# Patient Record
Sex: FEMALE | Race: WHITE | NOT HISPANIC OR LATINO | Employment: OTHER | URBAN - METROPOLITAN AREA
[De-identification: names, ages, dates, MRNs, and addresses within clinical notes are randomized per-mention and may not be internally consistent; named-entity substitution may affect disease eponyms.]

---

## 2017-01-06 DIAGNOSIS — R60.9 EDEMA, UNSPECIFIED TYPE: ICD-10-CM

## 2017-01-07 RX ORDER — NITROGLYCERIN 400 UG/1
SPRAY ORAL
Qty: 4.8 G | Refills: 0 | Status: SHIPPED | OUTPATIENT
Start: 2017-01-07 | End: 2017-02-09 | Stop reason: SDUPTHER

## 2017-01-07 RX ORDER — ALBUTEROL SULFATE 90 UG/1
AEROSOL, METERED RESPIRATORY (INHALATION)
Qty: 18 EACH | Refills: 0 | Status: SHIPPED | OUTPATIENT
Start: 2017-01-07 | End: 2017-09-26 | Stop reason: SDUPTHER

## 2017-01-07 RX ORDER — SPIRONOLACTONE 25 MG/1
TABLET ORAL
Qty: 60 TABLET | Refills: 0 | Status: SHIPPED | OUTPATIENT
Start: 2017-01-07 | End: 2017-02-09 | Stop reason: SDUPTHER

## 2017-01-26 ENCOUNTER — DOCUMENTATION ONLY (OUTPATIENT)
Dept: FAMILY MEDICINE | Facility: CLINIC | Age: 72
End: 2017-01-26

## 2017-01-26 NOTE — PROGRESS NOTES
Pre-Visit Chart Review  For Appointment Scheduled on 1/27/2017.    Health Maintenance Due   Topic Date Due    TETANUS VACCINE  06/17/1965    Pneumococcal (65+) (2 of 2 - PPSV23) 11/11/2016    Colonoscopy  11/27/2016

## 2017-01-27 ENCOUNTER — LAB VISIT (OUTPATIENT)
Dept: LAB | Facility: HOSPITAL | Age: 72
End: 2017-01-27
Attending: FAMILY MEDICINE
Payer: MEDICARE

## 2017-01-27 ENCOUNTER — OFFICE VISIT (OUTPATIENT)
Dept: FAMILY MEDICINE | Facility: CLINIC | Age: 72
End: 2017-01-27
Payer: MEDICARE

## 2017-01-27 ENCOUNTER — TELEPHONE (OUTPATIENT)
Dept: ENDOCRINOLOGY | Facility: CLINIC | Age: 72
End: 2017-01-27

## 2017-01-27 VITALS
RESPIRATION RATE: 16 BRPM | WEIGHT: 218.69 LBS | DIASTOLIC BLOOD PRESSURE: 65 MMHG | HEART RATE: 65 BPM | SYSTOLIC BLOOD PRESSURE: 140 MMHG | BODY MASS INDEX: 38.75 KG/M2 | TEMPERATURE: 99 F | HEIGHT: 63 IN

## 2017-01-27 DIAGNOSIS — Z23 IMMUNIZATION DUE: ICD-10-CM

## 2017-01-27 DIAGNOSIS — E11.8 CONTROLLED TYPE 2 DIABETES MELLITUS WITH COMPLICATION, WITHOUT LONG-TERM CURRENT USE OF INSULIN: ICD-10-CM

## 2017-01-27 DIAGNOSIS — I10 ESSENTIAL HYPERTENSION, BENIGN: ICD-10-CM

## 2017-01-27 DIAGNOSIS — K21.9 GASTROESOPHAGEAL REFLUX DISEASE WITHOUT ESOPHAGITIS: ICD-10-CM

## 2017-01-27 DIAGNOSIS — I25.10 CORONARY ATHEROSCLEROSIS OF UNSPECIFIED TYPE OF VESSEL, NATIVE OR GRAFT: ICD-10-CM

## 2017-01-27 DIAGNOSIS — D35.00 ADRENAL ADENOMA, UNSPECIFIED LATERALITY: ICD-10-CM

## 2017-01-27 DIAGNOSIS — E66.01 SEVERE OBESITY (BMI 35.0-35.9 WITH COMORBIDITY): ICD-10-CM

## 2017-01-27 DIAGNOSIS — M25.519 SHOULDER PAIN, UNSPECIFIED CHRONICITY, UNSPECIFIED LATERALITY: ICD-10-CM

## 2017-01-27 DIAGNOSIS — R91.1 PULMONARY NODULE: Primary | ICD-10-CM

## 2017-01-27 DIAGNOSIS — E55.9 VITAMIN D DEFICIENCY: ICD-10-CM

## 2017-01-27 PROCEDURE — 99214 OFFICE O/P EST MOD 30 MIN: CPT | Mod: S$PBB,,, | Performed by: FAMILY MEDICINE

## 2017-01-27 PROCEDURE — 83036 HEMOGLOBIN GLYCOSYLATED A1C: CPT

## 2017-01-27 PROCEDURE — 36415 COLL VENOUS BLD VENIPUNCTURE: CPT | Mod: PO

## 2017-01-27 PROCEDURE — 99999 PR PBB SHADOW E&M-EST. PATIENT-LVL III: CPT | Mod: PBBFAC,,, | Performed by: FAMILY MEDICINE

## 2017-01-27 RX ORDER — LOPERAMIDE HYDROCHLORIDE 2 MG/1
1 CAPSULE ORAL 4 TIMES DAILY PRN
COMMUNITY
Start: 2017-01-06 | End: 2018-09-10 | Stop reason: SDUPTHER

## 2017-01-27 RX ORDER — ACETAMINOPHEN 500 MG
500 TABLET ORAL EVERY 6 HOURS PRN
Status: ON HOLD | COMMUNITY
End: 2022-02-21 | Stop reason: HOSPADM

## 2017-01-27 RX ORDER — ERGOCALCIFEROL 1.25 MG/1
50000 CAPSULE ORAL
Qty: 12 CAPSULE | Refills: 0 | Status: SHIPPED | OUTPATIENT
Start: 2017-01-27 | End: 2017-03-17 | Stop reason: SDUPTHER

## 2017-01-27 NOTE — TELEPHONE ENCOUNTER
----- Message from Petra Sims sent at 1/27/2017  2:04 PM CST -----  Pt was seen today by Dr Sierra and needs an appt for Adrenal adenoma, unspecified laterality  She is willing to see anybody.  Please call her @ [phone].  Thanks !

## 2017-01-27 NOTE — PROGRESS NOTES
"[facility]  Progress Note    Subjective:       Patient ID: Marisol Kerr is a 71 y.o. female.    Chief Complaint: shoulder pain    HPI71 y.o.female is here today for follow-up visit.  Patient is coming early complaining of bilateral shoulder pain.  Patient is having pain has been increasing in severity.  Patient has been taking Tylenol for pain.  Tylenol has not been adequately controlling her symptoms of pain.  Patient has had recent colonoscopy performed.  Patient is also to follow with cardiology and pulmonary.  No further complaints at today's visit.  Review of Systems   Constitutional: Negative for chills, fatigue and fever.   HENT: Negative for congestion, ear pain, postnasal drip, sinus pressure, sneezing and sore throat.    Eyes: Negative for pain.   Respiratory: Negative for cough, shortness of breath and wheezing.    Cardiovascular: Negative for chest pain, palpitations and leg swelling.   Gastrointestinal: Negative for abdominal distention, abdominal pain, constipation, diarrhea, nausea and vomiting.   Genitourinary: Negative for difficulty urinating.   Musculoskeletal: Positive for arthralgias and myalgias. Negative for back pain.   Skin: Negative for rash.   Neurological: Negative for dizziness, seizures, syncope, weakness and numbness.   Psychiatric/Behavioral: Negative for sleep disturbance. The patient is not nervous/anxious.         Objective:      Vitals:    01/27/17 1310   BP: (!) 140/65   BP Location: Right arm   Patient Position: Sitting   BP Method: Automatic   Pulse: 65   Resp: 16   Temp: 98.5 °F (36.9 °C)   TempSrc: Oral   Weight: 99.2 kg (218 lb 11.1 oz)   Height: 5' 3" (1.6 m)     Body mass index is 38.74 kg/(m^2).  Physical Exam   Constitutional: She is oriented to person, place, and time. She appears well-developed and well-nourished.   Eyes: Conjunctivae are normal.   Cardiovascular: Normal rate, regular rhythm, normal heart sounds and intact distal pulses.  Exam reveals no " gallop and no friction rub.    No murmur heard.  Pulmonary/Chest: Effort normal and breath sounds normal. No respiratory distress. She has no wheezes. She has no rales. She exhibits no tenderness.   Abdominal: Soft.   Musculoskeletal: Normal range of motion.   Neurological: She is alert and oriented to person, place, and time.   Skin: She is not diaphoretic.   Psychiatric: She has a normal mood and affect. Her behavior is normal. Judgment and thought content normal.   Vitals reviewed.      Assessment:       1. Pulmonary nodule    2. Vitamin D deficiency    3. Essential hypertension, benign    4. Controlled type 2 diabetes mellitus with complication, without long-term current use of insulin    5. Severe obesity (BMI 35.0-35.9 with comorbidity)    6. Gastroesophageal reflux disease without esophagitis    7. Coronary atherosclerosis of unspecified type of vessel, native or graft    8. Shoulder pain, unspecified chronicity, unspecified laterality    9. Immunization due    10. Adrenal adenoma, unspecified laterality        Plan:       Pulmonary nodule  -     Follow up with pulmonary for further recommendations   Vitamin D deficiency  -     ergocalciferol (ERGOCALCIFEROL) 50,000 unit Cap; Take 1 capsule (50,000 Units total) by mouth every 7 days.  Dispense: 12 capsule; Refill: 0  Essential hypertension, benign        - Well controlled continue current medication  Controlled type 2 diabetes mellitus with complication, without long-term current use of insulin        - Well controlled continue current medications  Severe obesity (BMI 35.0-35.9 with comorbidity)        - Patient educated on diet and exercise   Gastroesophageal reflux disease without esophagitis        - Well controlled continue current medications  Coronary atherosclerosis of unspecified type of vessel, native or graft        - Follow up with cardiology   Shoulder pain, unspecified chronicity, unspecified laterality  -     Ambulatory referral to Physical  Medicine Rehab  Immunization due  -     Pneumococcal Polysaccharide Vaccine (23 Valent) (SQ/IM)  Adrenal adenoma        - Follow up with endocrinology     Patient readiness: acceptance and barriers:none    During the course of the visit the patient was educated and counseled about the following:     Diabetes:  Discussed general issues about diabetes pathophysiology and management.  Educational material distributed.  Addressed ADA diet.  Suggested low cholesterol diet.  Encouraged aerobic exercise.  Discussed foot care.  Reminded to get yearly retinal exam.  Discussed ways to avoid symptomatic hypoglycemia.  Hypertension:   Dietary sodium restriction.  Regular aerobic exercise.  Check blood pressures weekly and record.  Obesity:   General weight loss/lifestyle modification strategies discussed (elicit support from others; identify saboteurs; non-food rewards, etc).  Informal exercise measures discussed, e.g. taking stairs instead of elevator.  Regular aerobic exercise program discussed.    Goals: Diabetes: Maintain Hemoglobin A1C below 7, Hypertension: Reduce Blood Pressure and Obesity: Reduce calorie intake and BMI    Did patient meet goals/outcomes: Yes    The following self management tools provided: blood pressure log  blood glucose log  excercise log    Patient Instructions (the written plan) was given to the patient/family.     Time spent with patient: 30 minutes      Return in about 6 months (around 7/27/2017).  Krishan Sierra MD  [facility]  1/27/2017 1:29 PM

## 2017-01-27 NOTE — MR AVS SNAPSHOT
[facility]  [address]  [place] [address]  Phone: [phone]  Fax: [phone]                  Marisol Kerr   2017 1:00 PM   Office Visit    Description:  Female : 1945   Provider:  Krishan Sierra MD   Department:  [facility]           Reason for Visit     Follow-up           Diagnoses this Visit        Comments    Pulmonary nodule    -  Primary     Vitamin D deficiency         Essential hypertension, benign         Controlled type 2 diabetes mellitus with complication, without long-term current use of insulin         Severe obesity (BMI 35.0-35.9 with comorbidity)         Gastroesophageal reflux disease without esophagitis         Coronary atherosclerosis of unspecified type of vessel, native or graft         Shoulder pain, unspecified chronicity, unspecified laterality         Immunization due         Adrenal adenoma, unspecified laterality                To Do List           Future Appointments        Provider Department Dept Phone    2017 2:15 PM [facility] [phone]    3/2/2017 1:20 PM Heather Henderson DO [facility] [phone]    2017 1:00 PM Krishan Sierra MD [facility] [phone]      Goals (5 Years of Data)     None      Follow-Up and Disposition     Return in about 6 months (around 2017).    Follow-up and Disposition History       These Medications        Disp Refills Start End    ergocalciferol (ERGOCALCIFEROL) 50,000 unit Cap 12 capsule 0 2017     Take 1 capsule (50,000 Units total) by mouth every 7 days. - Oral    Pharmacy: [facility] ROHINI MARTINEZ [facility] Ph #: [phone]         [facility]     [facility] Nurse Care Line -  Assistance  Registered nurses in the [facility] provide clinical advisement, health education, appointment booking, and other advisory services.  Call for this free  service at [phone].             Medications           START taking these NEW medications        Refills    ergocalciferol (ERGOCALCIFEROL) 50,000 unit Cap 0    Sig: Take 1 capsule (50,000 Units total) by mouth every 7 days.    Class: Normal    Route: Oral           Verify that the below list of medications is an accurate representation of the medications you are currently taking.  If none reported, the list may be blank. If incorrect, please contact your healthcare provider. Carry this list with you in case of emergency.           Current Medications     acetaminophen (TYLENOL) 500 MG tablet Take 500 mg by mouth every 6 (six) hours as needed for Pain.    ADVAIR DISKUS 250-50 mcg/dose diskus inhaler INHALE ONE DOSE BY MOUTH TWICE DAILY    albuterol (PROVENTIL) 2.5 mg /3 mL (0.083 %) nebulizer solution USE ONE VIAL IN NEBULIZER EVERY 6 HOURS AS NEEDED FOR WHEEZING    ALBUTEROL SULFATE (VENTOLIN HFA INHL) Inhale 2 puffs into the lungs every 6 (six) hours as needed.    aspirin (ECOTRIN) 81 MG EC tablet Take 81 mg by mouth once daily.    benazepril (LOTENSIN) 40 MG tablet Take 1 tablet (40 mg total) by mouth once daily.    CALCIUM CARBONATE/VITAMIN D3 (VITAMIN D-3 ORAL) Take by mouth once daily. 1000 IU daily    ciclopirox (LOPROX) 0.77 % Crea Apply topically 2 (two) times daily.    COMBIVENT RESPIMAT  mcg/actuation inhaler INHALE TWO PUFFS BY MOUTH EVERY 4 HOURS AS NEEDED    fluticasone (FLONASE) 50 mcg/actuation nasal spray 2 sprays by Each Nare route once daily.    INCRUSE ELLIPTA 62.5 mcg/actuation DsDv     lipase-protease-amylase 12,000-38,000-60,000 units (CREON) CpDR Take 4 capsules by mouth 3 (three) times daily with meals.    loperamide (IMODIUM) 2 mg capsule Take 1 capsule by mouth 4 (four) times daily as needed for Diarrhea.    metformin (GLUCOPHAGE) 500 MG tablet TAKE ONE TABLET BY MOUTH ONCE DAILY WITH BREAKFAST    metoprolol succinate (TOPROL-XL) 50 MG 24 hr tablet Take 1 tablet (50 mg total)  "by mouth once daily.    mupirocin (BACTROBAN) 2 % ointment Apply topically once daily.    nitroGLYCERIN 0.2 mg/hr TD PT24 (NITRODUR) 0.2 mg/hr Place 1 patch onto the skin once daily.    nitroGLYCERIN 0.4 MG/DOSE TL SPRY (NITROLINGUAL) 400 mcg/spray spray PLACE 1 SPRAY UNDER THEN TONGUE EVERY 5 MINUTES AS NEEDED FOR CHEST PAIN (DO NOT EXCEED 3 SPRAYS WITHIN 15 MIN)    ondansetron (ZOFRAN-ODT) 4 MG TbDL     pantoprazole (PROTONIX) 20 MG tablet TAKE ONE TABLET BY MOUTH ONCE DAILY    PNV NO.115/IRON FUMARATE/FA (PRENATAL #115-IRON-FOLIC ACID ORAL) Take by mouth.    pravastatin (PRAVACHOL) 40 MG tablet Take 40 mg by mouth once daily.     spironolactone (ALDACTONE) 25 MG tablet TAKE ONE TABLET BY MOUTH TWICE DAILY    VENTOLIN HFA 90 mcg/actuation inhaler INHALE TWO PUFFS BY MOUTH EVERY 6 HOURS AS NEEDED    VIT A/VIT C/VIT E/ZINC/COPPER (ICAPS AREDS ORAL) Take 1 capsule by mouth 2 (two) times daily.    ergocalciferol (ERGOCALCIFEROL) 50,000 unit Cap Take 1 capsule (50,000 Units total) by mouth every 7 days.           Clinical Reference Information           Vital Signs - Last Recorded  Most recent update: 1/27/2017  1:24 PM by Lisa Irby MA    BP Pulse Temp Resp Ht Wt    (!) 140/65 (BP Location: Right arm, Patient Position: Sitting, BP Method: Automatic) 65 98.5 °F (36.9 °C) (Oral) 16 5' 3" (1.6 m) 99.2 kg (218 lb 11.1 oz)    BMI                38.74 kg/m2          Blood Pressure          Most Recent Value    BP  (!)  140/65      Allergies as of 1/27/2017     Contrast Media    Strawberries [Strawberry]      Immunizations Administered on Date of Encounter - 1/27/2017     Name Date Dose VIS Date Route    Pneumococcal Polysaccharide - 23 Valent 1/27/2017 0.5 mL 4/24/2015 Intramuscular      Orders Placed During Today's Visit      Normal Orders This Visit    Ambulatory referral to Endocrinology     Ambulatory referral to Physical Medicine Rehab     Pneumococcal Polysaccharide Vaccine (23 Valent) (SQ/IM)     Future " Labs/Procedures Expected by Expires    Hemoglobin A1c  1/27/2017 3/28/2018

## 2017-01-27 NOTE — TELEPHONE ENCOUNTER
Advised she did not want to go to [place] for sooner appt would like to keep appointment with Dr Spencer

## 2017-01-27 NOTE — PROGRESS NOTES
Two Patient identifiers used, Name and . VIS information given to patient and procedure was explained. Patient verbalized understanding. Pneumovax 23 administered IM in the left deltoid using sterile technique.  No residual bleeding noted. Patient tolerated procedure well.

## 2017-01-28 LAB
ESTIMATED AVG GLUCOSE: 120 MG/DL
HBA1C MFR BLD HPLC: 5.8 %

## 2017-02-09 DIAGNOSIS — R60.9 EDEMA, UNSPECIFIED TYPE: ICD-10-CM

## 2017-02-09 RX ORDER — ALBUTEROL SULFATE 90 UG/1
AEROSOL, METERED RESPIRATORY (INHALATION)
Qty: 18 EACH | Refills: 0 | Status: SHIPPED | OUTPATIENT
Start: 2017-02-09 | End: 2017-02-28 | Stop reason: SDUPTHER

## 2017-02-09 RX ORDER — SPIRONOLACTONE 25 MG/1
TABLET ORAL
Qty: 60 TABLET | Refills: 0 | Status: SHIPPED | OUTPATIENT
Start: 2017-02-09 | End: 2017-03-14 | Stop reason: SDUPTHER

## 2017-02-09 RX ORDER — NITROGLYCERIN 400 UG/1
SPRAY ORAL
Qty: 4.8 G | Refills: 0 | Status: SHIPPED | OUTPATIENT
Start: 2017-02-09 | End: 2017-03-14 | Stop reason: SDUPTHER

## 2017-02-11 RX ORDER — BENAZEPRIL HYDROCHLORIDE 40 MG/1
TABLET ORAL
Qty: 90 TABLET | Refills: 0 | OUTPATIENT
Start: 2017-02-11

## 2017-02-27 DIAGNOSIS — R19.7 DIARRHEA, UNSPECIFIED TYPE: ICD-10-CM

## 2017-02-28 RX ORDER — LOPERAMIDE HYDROCHLORIDE 2 MG/1
CAPSULE ORAL
Qty: 30 CAPSULE | Refills: 0 | Status: SHIPPED | OUTPATIENT
Start: 2017-02-28 | End: 2017-09-26 | Stop reason: SDUPTHER

## 2017-02-28 RX ORDER — BENAZEPRIL HYDROCHLORIDE 40 MG/1
TABLET ORAL
Qty: 90 TABLET | Refills: 0 | OUTPATIENT
Start: 2017-02-28

## 2017-02-28 RX ORDER — ALBUTEROL SULFATE 90 UG/1
AEROSOL, METERED RESPIRATORY (INHALATION)
Qty: 18 EACH | Refills: 0 | Status: SHIPPED | OUTPATIENT
Start: 2017-02-28 | End: 2017-04-14 | Stop reason: SDUPTHER

## 2017-03-01 ENCOUNTER — TELEPHONE (OUTPATIENT)
Dept: PHYSICAL MEDICINE AND REHAB | Facility: CLINIC | Age: 72
End: 2017-03-01

## 2017-03-06 ENCOUNTER — TELEPHONE (OUTPATIENT)
Dept: PHYSICAL MEDICINE AND REHAB | Facility: CLINIC | Age: 72
End: 2017-03-06

## 2017-03-14 DIAGNOSIS — R60.9 EDEMA, UNSPECIFIED TYPE: ICD-10-CM

## 2017-03-14 DIAGNOSIS — R19.7 DIARRHEA, UNSPECIFIED TYPE: ICD-10-CM

## 2017-03-14 RX ORDER — BENAZEPRIL HYDROCHLORIDE 40 MG/1
TABLET ORAL
Qty: 90 TABLET | Refills: 0 | OUTPATIENT
Start: 2017-03-14

## 2017-03-15 RX ORDER — NITROGLYCERIN 400 UG/1
SPRAY ORAL
Qty: 4.9 G | Refills: 6 | Status: SHIPPED | OUTPATIENT
Start: 2017-03-15 | End: 2017-09-26 | Stop reason: SDUPTHER

## 2017-03-15 RX ORDER — LOPERAMIDE HYDROCHLORIDE 2 MG/1
CAPSULE ORAL
Qty: 30 CAPSULE | Refills: 6 | Status: SHIPPED | OUTPATIENT
Start: 2017-03-15 | End: 2018-09-10 | Stop reason: SDUPTHER

## 2017-03-15 RX ORDER — SPIRONOLACTONE 25 MG/1
TABLET ORAL
Qty: 60 TABLET | Refills: 6 | Status: SHIPPED | OUTPATIENT
Start: 2017-03-15 | End: 2019-07-30

## 2017-03-15 RX ORDER — NITROGLYCERIN 400 UG/1
SPRAY ORAL
Qty: 4.9 G | Refills: 6 | Status: SHIPPED | OUTPATIENT
Start: 2017-03-15 | End: 2017-12-08 | Stop reason: SDUPTHER

## 2017-03-17 DIAGNOSIS — E55.9 VITAMIN D DEFICIENCY: ICD-10-CM

## 2017-03-21 RX ORDER — ERGOCALCIFEROL 1.25 MG/1
CAPSULE ORAL
Qty: 12 CAPSULE | Refills: 0 | Status: SHIPPED | OUTPATIENT
Start: 2017-03-21 | End: 2017-05-13 | Stop reason: SDUPTHER

## 2017-04-17 RX ORDER — BENAZEPRIL HYDROCHLORIDE 40 MG/1
TABLET ORAL
Qty: 30 TABLET | Refills: 5 | Status: SHIPPED | OUTPATIENT
Start: 2017-04-17 | End: 2017-09-26 | Stop reason: SDUPTHER

## 2017-04-17 RX ORDER — METOPROLOL SUCCINATE 50 MG/1
TABLET, EXTENDED RELEASE ORAL
Qty: 30 TABLET | Refills: 0 | Status: SHIPPED | OUTPATIENT
Start: 2017-04-17 | End: 2017-05-13 | Stop reason: SDUPTHER

## 2017-04-17 RX ORDER — ALBUTEROL SULFATE 90 UG/1
AEROSOL, METERED RESPIRATORY (INHALATION)
Qty: 18 EACH | Refills: 0 | Status: SHIPPED | OUTPATIENT
Start: 2017-04-17 | End: 2017-05-13 | Stop reason: SDUPTHER

## 2017-04-17 RX ORDER — METFORMIN HYDROCHLORIDE 500 MG/1
TABLET ORAL
Qty: 30 TABLET | Refills: 0 | Status: SHIPPED | OUTPATIENT
Start: 2017-04-17 | End: 2017-05-13 | Stop reason: SDUPTHER

## 2017-05-13 DIAGNOSIS — E55.9 VITAMIN D DEFICIENCY: ICD-10-CM

## 2017-05-15 RX ORDER — ALBUTEROL SULFATE 90 UG/1
AEROSOL, METERED RESPIRATORY (INHALATION)
Qty: 18 EACH | Refills: 0 | Status: SHIPPED | OUTPATIENT
Start: 2017-05-15 | End: 2017-06-18 | Stop reason: SDUPTHER

## 2017-05-15 RX ORDER — IPRATROPIUM BROMIDE AND ALBUTEROL 20; 100 UG/1; UG/1
SPRAY, METERED RESPIRATORY (INHALATION)
Qty: 4 G | Refills: 2 | Status: SHIPPED | OUTPATIENT
Start: 2017-05-15 | End: 2017-09-26 | Stop reason: SDUPTHER

## 2017-05-15 RX ORDER — METOPROLOL SUCCINATE 50 MG/1
TABLET, EXTENDED RELEASE ORAL
Qty: 30 TABLET | Refills: 0 | Status: SHIPPED | OUTPATIENT
Start: 2017-05-15 | End: 2017-06-18 | Stop reason: SDUPTHER

## 2017-05-15 RX ORDER — ERGOCALCIFEROL 1.25 MG/1
CAPSULE ORAL
Qty: 12 CAPSULE | Refills: 0 | Status: SHIPPED | OUTPATIENT
Start: 2017-05-15 | End: 2017-06-18 | Stop reason: SDUPTHER

## 2017-05-15 RX ORDER — METFORMIN HYDROCHLORIDE 500 MG/1
TABLET ORAL
Qty: 30 TABLET | Refills: 0 | Status: SHIPPED | OUTPATIENT
Start: 2017-05-15 | End: 2017-06-18 | Stop reason: SDUPTHER

## 2017-05-26 ENCOUNTER — TELEPHONE (OUTPATIENT)
Dept: FAMILY MEDICINE | Facility: CLINIC | Age: 72
End: 2017-05-26

## 2017-06-18 DIAGNOSIS — E55.9 VITAMIN D DEFICIENCY: ICD-10-CM

## 2017-06-19 RX ORDER — ALBUTEROL SULFATE 90 UG/1
AEROSOL, METERED RESPIRATORY (INHALATION)
Qty: 18 EACH | Refills: 0 | Status: SHIPPED | OUTPATIENT
Start: 2017-06-19 | End: 2017-07-18 | Stop reason: SDUPTHER

## 2017-06-19 RX ORDER — METFORMIN HYDROCHLORIDE 500 MG/1
TABLET ORAL
Qty: 30 TABLET | Refills: 0 | Status: SHIPPED | OUTPATIENT
Start: 2017-06-19 | End: 2017-07-18 | Stop reason: SDUPTHER

## 2017-06-19 RX ORDER — ERGOCALCIFEROL 1.25 MG/1
CAPSULE ORAL
Qty: 12 CAPSULE | Refills: 0 | Status: SHIPPED | OUTPATIENT
Start: 2017-06-19 | End: 2017-07-18 | Stop reason: SDUPTHER

## 2017-06-19 RX ORDER — PANTOPRAZOLE SODIUM 20 MG/1
TABLET, DELAYED RELEASE ORAL
Qty: 30 TABLET | Refills: 0 | Status: SHIPPED | OUTPATIENT
Start: 2017-06-19 | End: 2017-07-18 | Stop reason: SDUPTHER

## 2017-06-19 RX ORDER — METOPROLOL SUCCINATE 50 MG/1
TABLET, EXTENDED RELEASE ORAL
Qty: 30 TABLET | Refills: 0 | Status: SHIPPED | OUTPATIENT
Start: 2017-06-19 | End: 2017-07-18 | Stop reason: SDUPTHER

## 2017-06-23 ENCOUNTER — DOCUMENTATION ONLY (OUTPATIENT)
Dept: FAMILY MEDICINE | Facility: CLINIC | Age: 72
End: 2017-06-23

## 2017-06-23 NOTE — PROGRESS NOTES
Pre-Visit Chart Review  For Appointment Scheduled on 6/28/17.    Health Maintenance Due   Topic Date Due    TETANUS VACCINE  06/17/1965    Foot Exam  06/08/2017    Eye Exam  06/22/2017

## 2017-07-18 DIAGNOSIS — E55.9 VITAMIN D DEFICIENCY: ICD-10-CM

## 2017-07-18 RX ORDER — ERGOCALCIFEROL 1.25 MG/1
CAPSULE ORAL
Qty: 12 CAPSULE | Refills: 0 | Status: SHIPPED | OUTPATIENT
Start: 2017-07-18 | End: 2017-09-27 | Stop reason: SDUPTHER

## 2017-07-18 RX ORDER — METOPROLOL SUCCINATE 50 MG/1
TABLET, EXTENDED RELEASE ORAL
Qty: 90 TABLET | Refills: 3 | Status: SHIPPED | OUTPATIENT
Start: 2017-07-18 | End: 2018-05-26 | Stop reason: SDUPTHER

## 2017-07-18 RX ORDER — PANTOPRAZOLE SODIUM 20 MG/1
TABLET, DELAYED RELEASE ORAL
Qty: 90 TABLET | Refills: 3 | Status: SHIPPED | OUTPATIENT
Start: 2017-07-18 | End: 2018-04-20 | Stop reason: SDUPTHER

## 2017-07-18 RX ORDER — METFORMIN HYDROCHLORIDE 500 MG/1
TABLET ORAL
Qty: 90 TABLET | Refills: 3 | Status: SHIPPED | OUTPATIENT
Start: 2017-07-18 | End: 2018-04-20 | Stop reason: SDUPTHER

## 2017-07-18 RX ORDER — ALBUTEROL SULFATE 90 UG/1
AEROSOL, METERED RESPIRATORY (INHALATION)
Qty: 18 EACH | Refills: 3 | Status: SHIPPED | OUTPATIENT
Start: 2017-07-18 | End: 2017-11-13 | Stop reason: SDUPTHER

## 2017-08-08 ENCOUNTER — DOCUMENTATION ONLY (OUTPATIENT)
Dept: FAMILY MEDICINE | Facility: CLINIC | Age: 72
End: 2017-08-08

## 2017-08-08 NOTE — PROGRESS NOTES
Pre-Visit Chart Review  For Appointment Scheduled on 8/10/17.    Health Maintenance Due   Topic Date Due    TETANUS VACCINE  06/17/1965    Foot Exam  06/08/2017    Eye Exam  06/22/2017    Hemoglobin A1c  07/27/2017    Influenza Vaccine  08/01/2017

## 2017-08-17 DIAGNOSIS — E11.9 TYPE 2 DIABETES MELLITUS WITHOUT COMPLICATION: ICD-10-CM

## 2017-09-25 ENCOUNTER — NURSE TRIAGE (OUTPATIENT)
Dept: ADMINISTRATIVE | Facility: CLINIC | Age: 72
End: 2017-09-25

## 2017-09-25 NOTE — TELEPHONE ENCOUNTER
Complains of cough and congestion, 99.0-100.0 temp x 2days. Scheduled with patient 9/26/17 1500 JEFF Ruiz for assessment

## 2017-09-25 NOTE — TELEPHONE ENCOUNTER
Reason for Disposition   Continuous (nonstop) coughing interferes with work or school and no improvement using cough treatment per Care Advice    Protocols used: ST COUGH-A-[facility]    Call transferred from the scheduling desk.  Pt calling with concerns of coughing, runny nose and intermittent chest pain.  Pt states having thick mucous with cough, but mucous will not come up.  Pt not having chest pain now.  Attempt to schedule appointment with PCP, no available appointments noted.  Attempt to schedule with another Provider, declined.  Advised UC/ED.  Will message MD and Staff.

## 2017-09-26 ENCOUNTER — OFFICE VISIT (OUTPATIENT)
Dept: FAMILY MEDICINE | Facility: CLINIC | Age: 72
End: 2017-09-26
Payer: MEDICARE

## 2017-09-26 ENCOUNTER — DOCUMENTATION ONLY (OUTPATIENT)
Dept: FAMILY MEDICINE | Facility: CLINIC | Age: 72
End: 2017-09-26

## 2017-09-26 ENCOUNTER — HOSPITAL ENCOUNTER (OUTPATIENT)
Dept: RADIOLOGY | Facility: CLINIC | Age: 72
Discharge: HOME | End: 2017-09-26
Attending: PHYSICIAN ASSISTANT
Payer: MEDICARE

## 2017-09-26 VITALS
BODY MASS INDEX: 37.65 KG/M2 | WEIGHT: 212.5 LBS | HEART RATE: 69 BPM | TEMPERATURE: 98 F | SYSTOLIC BLOOD PRESSURE: 132 MMHG | HEIGHT: 63 IN | DIASTOLIC BLOOD PRESSURE: 48 MMHG

## 2017-09-26 DIAGNOSIS — R06.02 SOB (SHORTNESS OF BREATH): Primary | ICD-10-CM

## 2017-09-26 DIAGNOSIS — R06.01 ORTHOPNEA: ICD-10-CM

## 2017-09-26 DIAGNOSIS — L03.116 CELLULITIS OF LEFT LOWER EXTREMITY: ICD-10-CM

## 2017-09-26 DIAGNOSIS — E11.8 DM TYPE 2, CONTROLLED, WITH COMPLICATION: ICD-10-CM

## 2017-09-26 DIAGNOSIS — R60.0 LOWER EXTREMITY EDEMA: ICD-10-CM

## 2017-09-26 DIAGNOSIS — R06.02 SOB (SHORTNESS OF BREATH): ICD-10-CM

## 2017-09-26 PROCEDURE — 3078F DIAST BP <80 MM HG: CPT | Mod: ,,, | Performed by: PHYSICIAN ASSISTANT

## 2017-09-26 PROCEDURE — 1159F MED LIST DOCD IN RCRD: CPT | Mod: ,,, | Performed by: PHYSICIAN ASSISTANT

## 2017-09-26 PROCEDURE — 99215 OFFICE O/P EST HI 40 MIN: CPT | Mod: PBBFAC,PO | Performed by: PHYSICIAN ASSISTANT

## 2017-09-26 PROCEDURE — 1126F AMNT PAIN NOTED NONE PRSNT: CPT | Mod: ,,, | Performed by: PHYSICIAN ASSISTANT

## 2017-09-26 PROCEDURE — 71020 XR CHEST PA AND LATERAL: CPT | Mod: 26,,, | Performed by: RADIOLOGY

## 2017-09-26 PROCEDURE — 99214 OFFICE O/P EST MOD 30 MIN: CPT | Mod: S$PBB,,, | Performed by: PHYSICIAN ASSISTANT

## 2017-09-26 PROCEDURE — 71020 XR CHEST PA AND LATERAL: CPT | Mod: TC,PO

## 2017-09-26 PROCEDURE — 99999 PR PBB SHADOW E&M-EST. PATIENT-LVL V: CPT | Mod: PBBFAC,,, | Performed by: PHYSICIAN ASSISTANT

## 2017-09-26 PROCEDURE — 3075F SYST BP GE 130 - 139MM HG: CPT | Mod: ,,, | Performed by: PHYSICIAN ASSISTANT

## 2017-09-26 RX ORDER — FUROSEMIDE 20 MG/1
20 TABLET ORAL DAILY
Qty: 30 TABLET | Refills: 11 | Status: SHIPPED | OUTPATIENT
Start: 2017-09-26 | End: 2018-09-26

## 2017-09-26 RX ORDER — DOXYCYCLINE 100 MG/1
100 CAPSULE ORAL EVERY 12 HOURS
Qty: 20 CAPSULE | Refills: 0 | Status: SHIPPED | OUTPATIENT
Start: 2017-09-26 | End: 2019-01-16

## 2017-09-26 RX ORDER — MUPIROCIN 20 MG/G
OINTMENT TOPICAL DAILY
Qty: 30 G | Refills: 0 | Status: SHIPPED | OUTPATIENT
Start: 2017-09-26 | End: 2019-09-16 | Stop reason: ALTCHOICE

## 2017-09-26 NOTE — PROGRESS NOTES
Subjective:       Patient ID: Marisol Kerr is a 72 y.o. female.    Chief Complaint: Cough and Chest Congestion    Shortness of Breath   This is a recurrent problem. The current episode started 1 to 4 weeks ago. The problem occurs constantly. The problem has been gradually worsening. Associated symptoms include chest pain, leg pain, leg swelling, neck pain, orthopnea and sputum production. Pertinent negatives include no abdominal pain, fever, headaches, PND or rash. Risk factors include prolonged immobilization. She has tried beta agonist inhalers for the symptoms. The treatment provided mild relief.     Review of Systems   Constitutional: Negative for activity change, appetite change, chills, diaphoresis, fatigue, fever and unexpected weight change.   Eyes: Negative for visual disturbance.   Respiratory: Positive for cough, sputum production, chest tightness and shortness of breath.    Cardiovascular: Positive for chest pain, orthopnea and leg swelling. Negative for palpitations and PND.        Positive for orthopnea _sleeping in recliner now X 1 month    Gastrointestinal: Negative for abdominal distention, abdominal pain, constipation, diarrhea and nausea.   Endocrine: Negative for polydipsia and polyuria.   Genitourinary: Negative for difficulty urinating.   Musculoskeletal: Positive for neck pain. Negative for arthralgias.   Skin: Negative for rash.   Neurological: Negative for dizziness, light-headedness and headaches.   Psychiatric/Behavioral: Negative for dysphoric mood and sleep disturbance. The patient is not nervous/anxious.        Objective:      Physical Exam   Constitutional: She is cooperative. She does not have a sickly appearance. She does not appear ill. No distress. Nasal cannula in place.   Obese female in wheelchair    HENT:   Head: Normocephalic and atraumatic.   Eyes: Conjunctivae and EOM are normal. Pupils are equal, round, and reactive to light. No scleral icterus.   Cardiovascular: Normal  rate, regular rhythm and normal heart sounds.    Pulmonary/Chest: Effort normal and breath sounds normal.   Abdominal: Soft. Bowel sounds are normal.   Musculoskeletal:        Right lower leg: She exhibits edema (2 +).        Left lower leg: She exhibits edema (2+).        Legs:  Neurological: She is alert.   Skin: Skin is warm and dry.   Psychiatric: She has a normal mood and affect. Her speech is normal. Judgment normal. Cognition and memory are normal.   Vitals reviewed.      Assessment:       1. SOB (shortness of breath)    2. Orthopnea    3. Lower extremity edema    4. Cellulitis of left lower extremity    5. DM type 2, controlled, with complication        Plan:       Marisol was seen today for cough and chest congestion.    Diagnoses and all orders for this visit:    SOB (shortness of breath)  -     X-Ray Chest PA And Lateral; Future  -     Brain natriuretic peptide; Future  -     Comprehensive metabolic panel; Future    furosemide (LASIX) 20 MG tablet; Take 1 tablet (20 mg total) by mouth once daily.    Increase lasix to bid X 3 day   Orthopnea  -     X-Ray Chest PA And Lateral; Future  -     Brain natriuretic peptide; Future  -     Comprehensive metabolic panel; Future    Lower extremity edema  Increase lasix as above   Cellulitis of left lower extremity  -     doxycycline (VIBRAMYCIN) 100 MG Cap; Take 1 capsule (100 mg total) by mouth every 12 (twelve) hours.       mupirocin (BACTROBAN) 2 % ointment; Apply topically once daily.  -       DM type 2, controlled, with complication  -     Hemoglobin A1c; Future    1 week follow up   Sooner if symptoms worsen   Further recommendations will be made based on results    -   NAD, VSS, Afebrile, No facial or scalp tender or swelling, No spinal tender, No rib or CVA tender, No pelvic or hip tender, + B/L proximal tibia, lateral aspect, skin tears without active bleeding or gael tender. + Distal femur, medial aspect skin tear, no active bleeding or gael tender, N/V- intact.

## 2017-09-26 NOTE — PATIENT INSTRUCTIONS
Increase lasix to 20 mg twice a day X 3 days  Start antibiotic and take for 10 days  Use albuterol nebulizer every 4-6 hours

## 2017-09-26 NOTE — PROGRESS NOTES
Pre-Visit Chart Review  For Appointment Scheduled on 09/26/2017    Health Maintenance Due   Topic Date Due    TETANUS VACCINE  06/17/1965    Foot Exam  06/08/2017    Eye Exam  06/22/2017    Hemoglobin A1c  07/27/2017    Influenza Vaccine  08/01/2017

## 2017-09-27 ENCOUNTER — TELEPHONE (OUTPATIENT)
Dept: FAMILY MEDICINE | Facility: CLINIC | Age: 72
End: 2017-09-27

## 2017-09-27 DIAGNOSIS — E55.9 VITAMIN D DEFICIENCY: ICD-10-CM

## 2017-09-27 NOTE — TELEPHONE ENCOUNTER
I received a refill request for the combivnet  She also had incruse ellipta listed on chart  She should not be on both  Which is she using  Recommend any lung meds/inhalers be filled by pulmonary to avoid interaction/mix ups

## 2017-09-27 NOTE — TELEPHONE ENCOUNTER
Xray showed possible early pneumonia  Labs stable except for kidney function is decreased a bit from the prior lab  Continue as is   Follow up as scheduled   Does she see a nephro?

## 2017-09-28 RX ORDER — IPRATROPIUM BROMIDE AND ALBUTEROL 20; 100 UG/1; UG/1
SPRAY, METERED RESPIRATORY (INHALATION)
Qty: 4 G | Refills: 2 | OUTPATIENT
Start: 2017-09-28

## 2017-09-28 RX ORDER — ERGOCALCIFEROL 1.25 MG/1
CAPSULE ORAL
Qty: 12 CAPSULE | Refills: 0 | Status: SHIPPED | OUTPATIENT
Start: 2017-09-28 | End: 2018-05-26 | Stop reason: SDUPTHER

## 2017-10-27 RX ORDER — IPRATROPIUM BROMIDE AND ALBUTEROL 20; 100 UG/1; UG/1
SPRAY, METERED RESPIRATORY (INHALATION)
Qty: 3 PACKAGE | Refills: 2 | Status: SHIPPED | OUTPATIENT
Start: 2017-10-27 | End: 2019-09-16 | Stop reason: SDUPTHER

## 2017-10-27 RX ORDER — BENAZEPRIL HYDROCHLORIDE 40 MG/1
TABLET ORAL
Qty: 90 TABLET | Refills: 3 | Status: SHIPPED | OUTPATIENT
Start: 2017-10-27 | End: 2018-04-09 | Stop reason: SDUPTHER

## 2017-11-13 RX ORDER — ALBUTEROL SULFATE 90 UG/1
AEROSOL, METERED RESPIRATORY (INHALATION)
Qty: 18 EACH | Refills: 3 | Status: SHIPPED | OUTPATIENT
Start: 2017-11-13 | End: 2018-05-26 | Stop reason: SDUPTHER

## 2017-12-05 ENCOUNTER — TELEPHONE (OUTPATIENT)
Dept: FAMILY MEDICINE | Facility: CLINIC | Age: 72
End: 2017-12-05

## 2017-12-05 NOTE — TELEPHONE ENCOUNTER
Received a fax from [facility] requesting orders for diabetic supplies for this patient.  Spoke to patient. Confirmed that she has chosen [facility] to provide her diabetic testing supplies. States that she currently tests 2x daily.  Order form has been completed and placed on Dr Sierra's desk for signature. Will fax once signed.

## 2017-12-08 DIAGNOSIS — R19.7 DIARRHEA, UNSPECIFIED TYPE: ICD-10-CM

## 2017-12-09 ENCOUNTER — NURSE TRIAGE (OUTPATIENT)
Dept: ADMINISTRATIVE | Facility: CLINIC | Age: 72
End: 2017-12-09

## 2017-12-09 NOTE — TELEPHONE ENCOUNTER
"Recommended     Reason for Disposition   [1] SEVERE diarrhea (e.g., 7 or more times / day more than normal) AND [2]  age > 60 years     Marisol has been 6-7 stools a day, worsening diarrhea, for more than a week.  Please see triage call notes.  Has tried multiple medications for control, but none working.  Beginning yesterday, the stool became much more watery, and she now has trouble getting to the bathroom in time.  Recommended to Marisol's daughter, also named Marisol, that she bring her to ED now.  Age is 72, has diabetes, is on )2, and multiple chronic illnesses.  Her mom does not want to go. Strongly encouraged her to go.  Message to Krishan Sierra pcp.  Please contact caller directly with any additional care advice.    Additional Information   Commented on: Black or tarry bowel movements  (Exception: chronic-unchanged  black-grey bowel movements AND is taking iron pills or Pepto-bismol)     Did have black stool a couple of times, but was taking Kaopectate at the time. No longer black.    Answer Assessment - Initial Assessment Questions  1. DIARRHEA SEVERITY: "How bad is the diarrhea?" "How many extra stools have you had in the past 24 hours than normal?"     - MILD: Few loose or mushy BMs; increase of 1-3 stools over normal daily number of stools; mild increase in ostomy output.    - MODERATE: Increase of 4-6 stools daily over normal; moderate increase in ostomy output.    - SEVERE (or Worst Possible): Increase of 7 or more stools daily over normal; moderate increase in ostomy output; incontinence.      6 in last 24 hours, but has had diarrhea for the past week.    2. ONSET: "When did the diarrhea begin?"       One week ago.    3. BM CONSISTENCY: "How loose or watery is the diarrhea?"       Looks like water, with some small little chunks of feces in the water. She has lost control of her bowels and can't get to the bathroom in time since yesterday.    4. VOMITING: "Are you also vomiting?" If so, ask: "How many " "times in the past 24 hours?"       No vomiting.    5. ABDOMINAL PAIN: "Are you having any abdominal pain?" If yes: "What does it feel like?" (e.g., crampy, dull, intermittent, constant)       Cramping (a little) before each stool.    6. ABDOMINAL PAIN SEVERITY: If present, ask: "How bad is the pain?"  (e.g., Scale 1-10; mild, moderate, or severe)     - MILD (1-3): doesn't interfere with normal activities, abdomen soft and not tender to touch      - MODERATE (4-7): interferes with normal activities or awakens from sleep, tender to touch      - SEVERE (8-10): excruciating pain, doubled over, unable to do any normal activities        Cramping is 5-6 / 10 pain.    7. ORAL INTAKE: If vomiting, "Have you been able to drink liquids?" "How much fluids have you had in the past 24 hours?"      A few bottles of water and 2 cups of decaf coffee.    8. HYDRATION: "Any signs of dehydration?" (e.g., dry mouth [not just dry lips], too weak to stand, dizziness, new weight loss) "When did you last urinate?"      Last urinated less than 45 min ago.    9. EXPOSURE: "Have you traveled to a foreign country recently?" "Have you been exposed to anyone with diarrhea?" "Could you have eaten any food that was spoiled?"      No travel. No exposure to others that they know of.    10. OTHER SYMPTOMS: "Do you have any other symptoms?" (e.g., fever, blood in stool)        No fever, no blood in stool.    11. PREGNANCY: "Is there any chance you are pregnant?" "When was your last menstrual period?"        N/a    [facility]      "

## 2017-12-10 RX ORDER — NITROGLYCERIN 400 UG/1
SPRAY ORAL
Qty: 4.9 G | Refills: 6 | Status: SHIPPED | OUTPATIENT
Start: 2017-12-10 | End: 2018-09-07 | Stop reason: SDUPTHER

## 2017-12-10 RX ORDER — LOPERAMIDE HYDROCHLORIDE 2 MG/1
CAPSULE ORAL
Qty: 30 CAPSULE | Refills: 0 | Status: SHIPPED | OUTPATIENT
Start: 2017-12-10 | End: 2018-02-07 | Stop reason: SDUPTHER

## 2018-01-12 ENCOUNTER — TELEPHONE (OUTPATIENT)
Dept: FAMILY MEDICINE | Facility: CLINIC | Age: 73
End: 2018-01-12

## 2018-02-07 DIAGNOSIS — R19.7 DIARRHEA, UNSPECIFIED TYPE: ICD-10-CM

## 2018-02-07 RX ORDER — LOPERAMIDE HYDROCHLORIDE 2 MG/1
CAPSULE ORAL
Qty: 30 CAPSULE | Refills: 0 | Status: SHIPPED | OUTPATIENT
Start: 2018-02-07 | End: 2018-03-18 | Stop reason: SDUPTHER

## 2018-02-07 RX ORDER — BENAZEPRIL HYDROCHLORIDE 40 MG/1
TABLET ORAL
Qty: 30 TABLET | Refills: 5 | Status: SHIPPED | OUTPATIENT
Start: 2018-02-07 | End: 2018-04-09 | Stop reason: SDUPTHER

## 2018-03-18 DIAGNOSIS — R19.7 DIARRHEA, UNSPECIFIED TYPE: ICD-10-CM

## 2018-03-19 RX ORDER — LOPERAMIDE HYDROCHLORIDE 2 MG/1
CAPSULE ORAL
Qty: 30 CAPSULE | Refills: 0 | Status: SHIPPED | OUTPATIENT
Start: 2018-03-19 | End: 2018-05-26 | Stop reason: SDUPTHER

## 2018-03-22 RX ORDER — FLUTICASONE PROPIONATE 50 MCG
2 SPRAY, SUSPENSION (ML) NASAL DAILY
Qty: 16 G | Refills: 3 | Status: SHIPPED | OUTPATIENT
Start: 2018-03-22 | End: 2020-06-19 | Stop reason: SDUPTHER

## 2018-04-09 RX ORDER — BENAZEPRIL HYDROCHLORIDE 40 MG/1
40 TABLET ORAL DAILY
Qty: 90 TABLET | Refills: 0 | Status: SHIPPED | OUTPATIENT
Start: 2018-04-09 | End: 2018-06-15 | Stop reason: SDUPTHER

## 2018-04-09 NOTE — TELEPHONE ENCOUNTER
Patient not seen since 9/17    Does she have new PCP?   Needs follow up.    I will send in 3 mo supply and please have her estab new since alisia is leaving

## 2018-04-19 ENCOUNTER — TELEPHONE (OUTPATIENT)
Dept: FAMILY MEDICINE | Facility: CLINIC | Age: 73
End: 2018-04-19

## 2018-04-19 NOTE — TELEPHONE ENCOUNTER
Returned patient call. Appointment scheduled. Advised patient to call back if she runs out of medication before her appointment. Patient verbalized understanding

## 2018-04-19 NOTE — TELEPHONE ENCOUNTER
----- Message from Shakila Rudolph sent at 4/19/2018 10:11 AM CDT -----  Contact: Marisol  Type:  Patient Returning Call    Who Called:  patient  Who Left Message for Patient:  Not sure  Does the patient know what this is regarding?:  Appointment being set up before Dr Sierra leaves  Best Call Back Number:  [phone]  Additional Information:  Message left last week but not home

## 2018-04-20 DIAGNOSIS — R19.7 DIARRHEA, UNSPECIFIED TYPE: ICD-10-CM

## 2018-04-20 DIAGNOSIS — E55.9 VITAMIN D DEFICIENCY: ICD-10-CM

## 2018-04-20 RX ORDER — METFORMIN HYDROCHLORIDE 500 MG/1
TABLET ORAL
Qty: 90 TABLET | Refills: 3 | Status: SHIPPED | OUTPATIENT
Start: 2018-04-20 | End: 2019-09-16 | Stop reason: DRUGHIGH

## 2018-04-20 RX ORDER — LOPERAMIDE HYDROCHLORIDE 2 MG/1
CAPSULE ORAL
Qty: 30 CAPSULE | Refills: 0 | OUTPATIENT
Start: 2018-04-20

## 2018-04-20 RX ORDER — ERGOCALCIFEROL 1.25 MG/1
CAPSULE ORAL
Qty: 12 CAPSULE | Refills: 0 | OUTPATIENT
Start: 2018-04-20

## 2018-04-20 RX ORDER — PANTOPRAZOLE SODIUM 20 MG/1
TABLET, DELAYED RELEASE ORAL
Qty: 90 TABLET | Refills: 0 | Status: SHIPPED | OUTPATIENT
Start: 2018-04-20 | End: 2018-07-30 | Stop reason: SDUPTHER

## 2018-05-22 DIAGNOSIS — E11.8 TYPE 2 DIABETES MELLITUS WITH COMPLICATION, WITHOUT LONG-TERM CURRENT USE OF INSULIN: Primary | ICD-10-CM

## 2018-05-22 DIAGNOSIS — Z12.39 BREAST CANCER SCREENING: ICD-10-CM

## 2018-05-26 DIAGNOSIS — R19.7 DIARRHEA, UNSPECIFIED TYPE: ICD-10-CM

## 2018-05-26 DIAGNOSIS — E55.9 VITAMIN D DEFICIENCY: ICD-10-CM

## 2018-05-28 RX ORDER — ERGOCALCIFEROL 1.25 MG/1
CAPSULE ORAL
Qty: 12 CAPSULE | Refills: 0 | Status: SHIPPED | OUTPATIENT
Start: 2018-05-28 | End: 2019-09-16 | Stop reason: SDUPTHER

## 2018-05-28 RX ORDER — METOPROLOL SUCCINATE 50 MG/1
TABLET, EXTENDED RELEASE ORAL
Qty: 90 TABLET | Refills: 3 | Status: SHIPPED | OUTPATIENT
Start: 2018-05-28 | End: 2019-09-16 | Stop reason: DRUGHIGH

## 2018-05-28 RX ORDER — ALBUTEROL SULFATE 90 UG/1
AEROSOL, METERED RESPIRATORY (INHALATION)
Qty: 18 EACH | Refills: 3 | Status: SHIPPED | OUTPATIENT
Start: 2018-05-28 | End: 2019-09-16 | Stop reason: DRUGHIGH

## 2018-05-28 RX ORDER — LOPERAMIDE HYDROCHLORIDE 2 MG/1
CAPSULE ORAL
Qty: 30 CAPSULE | Refills: 0 | Status: SHIPPED | OUTPATIENT
Start: 2018-05-28 | End: 2019-09-16 | Stop reason: SDUPTHER

## 2018-06-08 ENCOUNTER — TELEPHONE (OUTPATIENT)
Dept: ENDOCRINOLOGY | Facility: CLINIC | Age: 73
End: 2018-06-08

## 2018-06-15 RX ORDER — BENAZEPRIL HYDROCHLORIDE 40 MG/1
40 TABLET ORAL DAILY
Qty: 90 TABLET | Refills: 1 | Status: SHIPPED | OUTPATIENT
Start: 2018-06-15 | End: 2019-09-26 | Stop reason: SDUPTHER

## 2018-06-15 NOTE — TELEPHONE ENCOUNTER
Received refill request for BP med  She missed her last 2 visits   Has she established care elsewhere?  If so refill needs to go to new PCP  If not we need to get her to estab care with new pcp asap

## 2018-07-30 RX ORDER — PANTOPRAZOLE SODIUM 20 MG/1
20 TABLET, DELAYED RELEASE ORAL DAILY
Qty: 90 TABLET | Refills: 0 | Status: SHIPPED | OUTPATIENT
Start: 2018-07-30 | End: 2019-09-26 | Stop reason: SDUPTHER

## 2018-09-07 NOTE — PROGRESS NOTES
Refill Authorization Note     is requesting a refill authorization.    Brief assessment and rationale for refill: APPROVE; prr  Amount/Quantity of medication ordered: 90d  Date of last appointment: 9/26/2017     Refills Authorized: Yes  If authorized number of refills: 0        Medication-related problems identified: Therapeutic duplication  Medication Therapy Plan: DCed duplicates; Hx of Angina; BP-ucontrolled; Nov-10/1/18 w/ Dr Laguna to est; Approve 3 more months   Name and strength of medication: nitroGLYCERIN 0.4 MG/DOSE TL SPRY (NITROLINGUAL) 400 mcg/spray spray  How patient will take medication: UTD  Medication reconciliation completed: Yes        Comments:      BP Readings from Last 3 Encounters:   09/26/17 (!) 132/48   01/27/17 (!) 140/65   10/27/16 (!) 178/65

## 2018-09-10 RX ORDER — NITROGLYCERIN 400 UG/1
1 SPRAY ORAL EVERY 5 MIN PRN
Qty: 4.9 G | Refills: 0 | Status: SHIPPED | OUTPATIENT
Start: 2018-09-10 | End: 2020-12-31

## 2018-09-20 RX ORDER — MELOXICAM 7.5 MG/1
1 TABLET ORAL DAILY
COMMUNITY
Start: 2018-08-28 | End: 2019-09-16 | Stop reason: SDUPTHER

## 2018-09-20 RX ORDER — FLUTICASONE FUROATE, UMECLIDINIUM BROMIDE AND VILANTEROL TRIFENATATE 100; 62.5; 25 UG/1; UG/1; UG/1
1 POWDER RESPIRATORY (INHALATION) 2 TIMES DAILY
COMMUNITY
Start: 2018-07-19 | End: 2019-07-30

## 2018-09-20 RX ORDER — TEMAZEPAM 15 MG/1
1 CAPSULE ORAL DAILY
COMMUNITY
Start: 2018-09-10 | End: 2019-07-30

## 2018-09-20 RX ORDER — PANCRELIPASE 36000; 180000; 114000 [USP'U]/1; [USP'U]/1; [USP'U]/1
1 CAPSULE, DELAYED RELEASE PELLETS ORAL 4 TIMES DAILY
COMMUNITY
Start: 2018-09-07 | End: 2020-09-03 | Stop reason: SDUPTHER

## 2018-09-20 RX ORDER — LOVASTATIN 20 MG/1
20 TABLET ORAL NIGHTLY
COMMUNITY
Start: 2018-09-05 | End: 2019-09-26 | Stop reason: SDUPTHER

## 2018-09-21 ENCOUNTER — OFFICE VISIT (OUTPATIENT)
Dept: ORTHOPEDICS | Facility: CLINIC | Age: 73
End: 2018-09-21
Payer: MEDICARE

## 2018-09-21 VITALS — BODY MASS INDEX: 37.56 KG/M2 | WEIGHT: 212 LBS | HEIGHT: 63 IN

## 2018-09-21 DIAGNOSIS — M16.0 PRIMARY OSTEOARTHRITIS OF BOTH HIPS: Primary | ICD-10-CM

## 2018-09-21 PROCEDURE — 99203 OFFICE O/P NEW LOW 30 MIN: CPT | Mod: ,,, | Performed by: ORTHOPAEDIC SURGERY

## 2018-09-21 NOTE — PROGRESS NOTES
Subjective:       Chief Complaint    Chief Complaint   Patient presents with    Left Hip - Pain, Injury     DOI: 7/15/18. Pt was on vacation in [place] and fell in her Hotel room. She was in the bathroom grabbing the bar in the bathroom when it came out of the wall. She fell injuring her Left side.        HPI  Marisol Kerr is a 73 y.o.  female who presents painful left hip joint. He has severe arthritis in both her hips, worse on the left side.      Past History  Past Medical History:   Diagnosis Date    Cancer     colon     History reviewed. No pertinent surgical history.  Social History     Socioeconomic History    Marital status:      Spouse name: Not on file    Number of children: Not on file    Years of education: Not on file    Highest education level: Not on file   Social Needs    Financial resource strain: Not on file    Food insecurity - worry: Not on file    Food insecurity - inability: Not on file    Transportation needs - medical: Not on file    Transportation needs - non-medical: Not on file   Occupational History    Not on file   Tobacco Use    Smoking status: Never Smoker    Smokeless tobacco: Never Used   Substance and Sexual Activity    Alcohol use: Yes    Drug use: No    Sexual activity: Not on file   Other Topics Concern    Not on file   Social History Narrative    Not on file         Medications  Current Outpatient Medications   Medication Sig    acetaminophen (TYLENOL) 500 MG tablet Take 500 mg by mouth every 6 (six) hours as needed for Pain.    ADVAIR DISKUS 250-50 mcg/dose diskus inhaler INHALE ONE DOSE BY MOUTH TWICE DAILY    albuterol (PROVENTIL) 2.5 mg /3 mL (0.083 %) nebulizer solution USE ONE VIAL IN NEBULIZER EVERY 6 HOURS AS NEEDED FOR WHEEZING    aspirin (ECOTRIN) 81 MG EC tablet Take 81 mg by mouth once daily.    benazepril (LOTENSIN) 40 MG tablet Take 1 tablet (40 mg total) by mouth once daily.    CALCIUM CARBONATE/VITAMIN D3 (VITAMIN D-3 ORAL) Take  by mouth once daily. 1000 IU daily    ciclopirox (LOPROX) 0.77 % Crea Apply topically 2 (two) times daily.    COMBIVENT RESPIMAT  mcg/actuation inhaler INHALE TWO PUFFS BY MOUTH EVERY 4 HOURS AS NEEDED    CREON 36,000-114,000- 180,000 unit CpDR Take 1 capsule by mouth 4 (four) times daily.    doxycycline (VIBRAMYCIN) 100 MG Cap Take 1 capsule (100 mg total) by mouth every 12 (twelve) hours.    fluticasone (FLONASE) 50 mcg/actuation nasal spray 2 sprays (100 mcg total) by Each Nare route once daily.    furosemide (LASIX) 20 MG tablet Take 1 tablet (20 mg total) by mouth once daily.    INCRUSE ELLIPTA 62.5 mcg/actuation DsDv     loperamide (IMODIUM) 2 mg capsule TAKE 1 CAPSULE BY MOUTH 4 TIMES DAILY AS NEEDED FOR DIARRHEA    lovastatin (MEVACOR) 20 MG tablet Take 1 tablet by mouth once daily.    meloxicam (MOBIC) 7.5 MG tablet Take 1 tablet by mouth once daily.    metFORMIN (GLUCOPHAGE) 500 MG tablet TAKE ONE TABLET BY MOUTH ONCE DAILY WITH  BREKAFAST    metoprolol succinate (TOPROL-XL) 50 MG 24 hr tablet TAKE ONE TABLET BY MOUTH ONCE DAILY    mupirocin (BACTROBAN) 2 % ointment Apply topically once daily.    nitroGLYCERIN 0.4 MG/DOSE TL SPRY (NITROLINGUAL) 400 mcg/spray spray Place 1 spray under the tongue every 5 (five) minutes as needed for Chest pain (DO NOT EXCEED A TOTAL OF 3 SPRAYS IN 15 MINUTES).    ondansetron (ZOFRAN-ODT) 4 MG TbDL     pantoprazole (PROTONIX) 20 MG tablet Take 1 tablet (20 mg total) by mouth once daily.    PNV NO.115/IRON FUMARATE/FA (PRENATAL #115-IRON-FOLIC ACID ORAL) Take by mouth.    pravastatin (PRAVACHOL) 40 MG tablet Take 40 mg by mouth once daily.     spironolactone (ALDACTONE) 25 MG tablet TAKE ONE TABLET BY MOUTH TWICE DAILY    temazepam (RESTORIL) 15 mg Cap Take 1 capsule by mouth once daily.    TRELEGY ELLIPTA 100-62.5-25 mcg DsDv Take 1 puff by mouth 2 (two) times daily.    VENTOLIN HFA 90 mcg/actuation inhaler INHALE TWO PUFFS BY MOUTH EVERY 6 HOURS  AS NEEDED    VIT A/VIT C/VIT E/ZINC/COPPER (ICAPS AREDS ORAL) Take 1 capsule by mouth 2 (two) times daily.    VITAMIN D2 50,000 unit capsule TAKE ONE CAPSULE BY MOUTH ONCE A WEEK     No current facility-administered medications for this visit.        Allergies  Review of patient's allergies indicates:   Allergen Reactions    Contrast media     Strawberries [strawberry] Hives and Itching         Review of Systems     Constitutional: Negative    HENT: Negative  Eyes: Negative  Respiratory: Negative  Cardiovascular: Negative  Musculoskeletal: HPI  Skin: Negative  Neurological: Negative  Hematological: Negative  Endocrine: Negative      Physical Exam    There were no vitals filed for this visit.  Physical Examination: Is very stiff, painful hip with the no mobility. Attempted mobility is very painful. Very tender anteriorly over the hip. She is currently sitting in a wheelchair.     Skin-clear  General appearance -  well appearing, and in no distress  Mental status - awake  Neck - supple  Chest -  symmetric air entry  Heart - normal rate   Abdomen - soft      Assessment/Plan   Primary osteoarthritis of both hips  -     FL Aspiration Injection Major Joint Left      She's been scheduled for an x-ray guided steroid injection in the left hip joint. She has gone from 275, down 296 pounds. She is currently on oxygen in a wheelchair. Doesn't really walk. Stands up for transfers. Had hip pain prior to her fall, but [facility]        This note was dictated using voice recognition software and may contain grammatical errors.

## 2018-09-26 ENCOUNTER — DOCUMENTATION ONLY (OUTPATIENT)
Dept: FAMILY MEDICINE | Facility: CLINIC | Age: 73
End: 2018-09-26

## 2018-10-30 ENCOUNTER — OFFICE VISIT (OUTPATIENT)
Dept: ORTHOPEDICS | Facility: CLINIC | Age: 73
End: 2018-10-30
Payer: MEDICARE

## 2018-10-30 VITALS — BODY MASS INDEX: 37.56 KG/M2 | HEIGHT: 63 IN | WEIGHT: 212 LBS

## 2018-10-30 DIAGNOSIS — M16.0 PRIMARY OSTEOARTHRITIS OF BOTH HIPS: Primary | ICD-10-CM

## 2018-10-30 PROCEDURE — 99212 OFFICE O/P EST SF 10 MIN: CPT | Mod: ,,, | Performed by: ORTHOPAEDIC SURGERY

## 2018-11-05 RX ORDER — IPRATROPIUM BROMIDE AND ALBUTEROL 20; 100 UG/1; UG/1
SPRAY, METERED RESPIRATORY (INHALATION)
Refills: 8 | OUTPATIENT
Start: 2018-11-05

## 2018-11-05 RX ORDER — ALBUTEROL SULFATE 90 UG/1
AEROSOL, METERED RESPIRATORY (INHALATION)
Qty: 18 EACH | Refills: 3 | OUTPATIENT
Start: 2018-11-05

## 2018-12-07 RX ORDER — NITROGLYCERIN 400 UG/1
SPRAY ORAL
Refills: 0 | OUTPATIENT
Start: 2018-12-07

## 2018-12-07 RX ORDER — ALBUTEROL SULFATE 90 UG/1
AEROSOL, METERED RESPIRATORY (INHALATION)
Qty: 18 EACH | Refills: 3 | OUTPATIENT
Start: 2018-12-07

## 2019-01-15 RX ORDER — MELOXICAM 7.5 MG/1
TABLET ORAL
COMMUNITY
End: 2019-01-15 | Stop reason: SDUPTHER

## 2019-01-15 RX ORDER — FUROSEMIDE 20 MG/1
20 TABLET ORAL EVERY MORNING
COMMUNITY
Start: 2018-11-04 | End: 2019-12-04 | Stop reason: SDUPTHER

## 2019-01-15 RX ORDER — ONDANSETRON 4 MG/1
TABLET, ORALLY DISINTEGRATING ORAL
COMMUNITY
End: 2019-09-16 | Stop reason: DRUGHIGH

## 2019-01-16 ENCOUNTER — OFFICE VISIT (OUTPATIENT)
Dept: PULMONOLOGY | Facility: CLINIC | Age: 74
End: 2019-01-16
Payer: MEDICARE

## 2019-01-16 VITALS
HEART RATE: 84 BPM | OXYGEN SATURATION: 92 % | DIASTOLIC BLOOD PRESSURE: 80 MMHG | HEIGHT: 63 IN | WEIGHT: 198 LBS | BODY MASS INDEX: 35.08 KG/M2 | SYSTOLIC BLOOD PRESSURE: 110 MMHG

## 2019-01-16 DIAGNOSIS — R91.1 PULMONARY NODULE: Primary | ICD-10-CM

## 2019-01-16 DIAGNOSIS — H60.502 ACUTE OTITIS EXTERNA OF LEFT EAR, UNSPECIFIED TYPE: ICD-10-CM

## 2019-01-16 DIAGNOSIS — R91.8 GROUND GLASS OPACITY PRESENT ON IMAGING OF LUNG: ICD-10-CM

## 2019-01-16 DIAGNOSIS — J96.11 CHRONIC HYPOXEMIC RESPIRATORY FAILURE: ICD-10-CM

## 2019-01-16 PROBLEM — J44.9 COPD (CHRONIC OBSTRUCTIVE PULMONARY DISEASE): Status: ACTIVE | Noted: 2019-01-16

## 2019-01-16 PROCEDURE — 99214 OFFICE O/P EST MOD 30 MIN: CPT | Mod: ,,, | Performed by: NURSE PRACTITIONER

## 2019-01-16 PROCEDURE — 99214 PR OFFICE/OUTPT VISIT, EST, LEVL IV, 30-39 MIN: ICD-10-PCS | Mod: ,,, | Performed by: NURSE PRACTITIONER

## 2019-01-16 RX ORDER — CIPROFLOXACIN AND DEXAMETHASONE 3; 1 MG/ML; MG/ML
4 SUSPENSION/ DROPS AURICULAR (OTIC) 2 TIMES DAILY
Qty: 1 ML | Refills: 0 | Status: SHIPPED | OUTPATIENT
Start: 2019-01-16 | End: 2019-01-23

## 2019-01-16 NOTE — PATIENT INSTRUCTIONS

## 2019-01-16 NOTE — PROGRESS NOTES
SUBJECTIVE:    Patient ID: Marisol Kerr is a 73 y.o. female.    Chief Complaint: Follow-up (6 mo check )    HPI   Patient here today feeling well with her daughter.  She stopped using Trelegy because she did not feel like it worked as well as Advair and Incruse.  She is wearing her oxygen all the time. She has been eating better trying to lose weight.  She is complaining of pain to her left ear when she swallows and touches it.    Past Medical History:   Diagnosis Date    CAD (coronary artery disease)     Cancer     colon    CHF (congestive heart failure)     Colon cancer     COPD (chronic obstructive pulmonary disease)     Decreased hearing     Diabetes mellitus     Hypercholesterolemia     Hypertension     Hypoxemia     Insomnia     Obesity     Osteoarthritis      Past Surgical History:   Procedure Laterality Date    CHOLECYSTECTOMY      COLECTOMY      EXTERNAL EAR SURGERY       Family History   Problem Relation Age of Onset    Febrile seizures Mother     Heart failure Father         Social History:   Marital Status:   Occupation: Data Unavailable  Alcohol History:  reports that she drinks alcohol.  Tobacco History:  reports that she has been smoking.  She has a 150.00 pack-year smoking history. she has never used smokeless tobacco.  Drug History:  reports that she does not use drugs.    Review of patient's allergies indicates:   Allergen Reactions    Contrast media     Strawberries [strawberry] Hives and Itching       Current Outpatient Medications   Medication Sig Dispense Refill    acetaminophen (TYLENOL) 500 MG tablet Take 500 mg by mouth every 6 (six) hours as needed for Pain.      ADVAIR DISKUS 250-50 mcg/dose diskus inhaler INHALE ONE DOSE BY MOUTH TWICE DAILY 60 each 2    albuterol (PROVENTIL) 2.5 mg /3 mL (0.083 %) nebulizer solution USE ONE VIAL IN NEBULIZER EVERY 6 HOURS AS NEEDED FOR WHEEZING 150 each 2    aspirin (ECOTRIN) 81 MG EC tablet Take 81 mg by mouth once  daily.      benazepril (LOTENSIN) 40 MG tablet Take 1 tablet (40 mg total) by mouth once daily. 90 tablet 1    CALCIUM CARBONATE/VITAMIN D3 (VITAMIN D-3 ORAL) Take by mouth once daily. 1000 IU daily      ciclopirox (LOPROX) 0.77 % Crea Apply topically 2 (two) times daily. 90 g 3    COMBIVENT RESPIMAT  mcg/actuation inhaler INHALE TWO PUFFS BY MOUTH EVERY 4 HOURS AS NEEDED 3 Package 2    CREON 36,000-114,000- 180,000 unit CpDR Take 1 capsule by mouth 4 (four) times daily.      furosemide (LASIX) 20 MG tablet       INCRUSE ELLIPTA 62.5 mcg/actuation DsDv       lipase-protease-amylase (CREON) 36,000-114,000- 180,000 unit CpDR Creon 36,000 unit-114,000 unit-180,000 unit capsule,delayed release      loperamide (IMODIUM) 2 mg capsule TAKE 1 CAPSULE BY MOUTH 4 TIMES DAILY AS NEEDED FOR DIARRHEA 30 capsule 0    lovastatin (MEVACOR) 20 MG tablet Take 1 tablet by mouth once daily.      meloxicam (MOBIC) 7.5 MG tablet Take 1 tablet by mouth once daily.      metFORMIN (GLUCOPHAGE) 500 MG tablet TAKE ONE TABLET BY MOUTH ONCE DAILY WITH  BREKAFAST 90 tablet 3    metoprolol succinate (TOPROL-XL) 50 MG 24 hr tablet TAKE ONE TABLET BY MOUTH ONCE DAILY 90 tablet 3    mupirocin (BACTROBAN) 2 % ointment Apply topically once daily. 30 g 0    nitroGLYCERIN 0.4 MG/DOSE TL SPRY (NITROLINGUAL) 400 mcg/spray spray Place 1 spray under the tongue every 5 (five) minutes as needed for Chest pain (DO NOT EXCEED A TOTAL OF 3 SPRAYS IN 15 MINUTES). 4.9 g 0    ondansetron (ZOFRAN-ODT) 4 MG TbDL ondansetron 4 mg disintegrating tablet   DISSOLVE 2 TABLETS IN MOUTH EVERY 12 HOURS AS NEEDED FOR 10 DAYS      pantoprazole (PROTONIX) 20 MG tablet Take 1 tablet (20 mg total) by mouth once daily. 90 tablet 0    PNV NO.115/IRON FUMARATE/FA (PRENATAL #115-IRON-FOLIC ACID ORAL) Take by mouth.      pravastatin (PRAVACHOL) 40 MG tablet Take 40 mg by mouth once daily.       spironolactone (ALDACTONE) 25 MG tablet TAKE ONE TABLET BY MOUTH  "TWICE DAILY 60 tablet 6    temazepam (RESTORIL) 15 mg Cap Take 1 capsule by mouth once daily.      VENTOLIN HFA 90 mcg/actuation inhaler INHALE TWO PUFFS BY MOUTH EVERY 6 HOURS AS NEEDED 18 each 3    VIT A/VIT C/VIT E/ZINC/COPPER (ICAPS AREDS ORAL) Take 1 capsule by mouth 2 (two) times daily.      VITAMIN D2 50,000 unit capsule TAKE ONE CAPSULE BY MOUTH ONCE A WEEK 12 capsule 0    ciprofloxacin-dexamethasone 0.3-0.1% (CIPRODEX) 0.3-0.1 % DrpS Place 4 drops into the left ear 2 (two) times daily. for 7 days 1 mL 0    fluticasone (FLONASE) 50 mcg/actuation nasal spray 2 sprays (100 mcg total) by Each Nare route once daily. 16 g 3    TRELEGY ELLIPTA 100-62.5-25 mcg DsDv Take 1 puff by mouth 2 (two) times daily.       No current facility-administered medications for this visit.          Last PFT:  1/2018   Last CT:05/2018    Review of Systems  General: Feeling Well.  Eyes: Vision is good.  ENT:  Left ear hurts    Heart:: No chest pain or palpitations.  Lungs: breathing is stable  GI: No Nausea, vomiting, constipation, diarrhea, or reflux.  : No dysuria, hesitancy, or nocturia.  Musculoskeletal: No joint pain or myalgias.  Skin: No lesions or rashes.  Neuro: No headaches or neuropathy.  Lymph: No edema or adenopathy.  Psych: No anxiety or depression.  Endo: weight loss from diet     OBJECTIVE:      /80 (BP Location: Left arm, Patient Position: Sitting)   Pulse 84   Ht 5' 3" (1.6 m)   Wt 89.8 kg (198 lb)   SpO2 (!) 92% Comment: 3.0 oxygen level  BMI 35.07 kg/m²     Physical Exam  GENERAL: Older patient in no distress.  HEENT: Pupils equal and reactive. Extraocular movements intact. Nose intact.      Pharynx moist.  Erythema to left external auditory canal, no drainage, good light reflex   NECK: Supple.   HEART: Regular rate and rhythm. No murmur or gallop auscultated.  LUNGS: Clear but decreased throughout  to auscultation and percussion. Lung excursion symmetrical. No change in fremitus. No adventitial " noises.  ABDOMEN: Bowel sounds present. Non-tender, no masses palpated.  EXTREMITIES: Normal muscle tone and joint movement, no cyanosis or clubbing.   LYMPHATICS: No adenopathy palpated, no edema.  SKIN: Dry, intact, no lesions.   NEURO: Cranial nerves II-XII intact. Motor strength 5/5 bilaterally, upper and lower extremities.  PSYCH: Appropriate affect.    Assessment:       1. Pulmonary nodule    2. Ground glass opacity present on imaging of lung    3. Chronic hypoxemic respiratory failure    4. Acute otitis externa of left ear, unspecified type          Plan:       Pulmonary nodule  -     CT Chest Without Contrast; Future    Ground glass opacity present on imaging of lung  -     CT Chest Without Contrast; Future    Chronic hypoxemic respiratory failure    Acute otitis externa of left ear, unspecified type    Other orders  -     ciprofloxacin-dexamethasone 0.3-0.1% (CIPRODEX) 0.3-0.1 % DrpS; Place 4 drops into the left ear 2 (two) times daily. for 7 days  Dispense: 1 mL; Refill: 0       CT chest to evaluate nodule  Continue advair and Incruse  Oxygen all the time  Ciprodex drops to left ear 2 drops x7 days, if still hurting need to see ENT  Follow-up in about 6 months (around 7/16/2019).

## 2019-01-23 ENCOUNTER — TELEPHONE (OUTPATIENT)
Dept: PULMONOLOGY | Facility: CLINIC | Age: 74
End: 2019-01-23

## 2019-01-23 NOTE — TELEPHONE ENCOUNTER
CT chest IMPRESSION:  1. No interval change from prior exam.  2. Recommend a one-year follow-up noncontrast CT of the lungs for further follow-up of the groundglass nodule i

## 2019-02-05 RX ORDER — BENAZEPRIL HYDROCHLORIDE 40 MG/1
TABLET ORAL
Qty: 30 TABLET | Refills: 5 | OUTPATIENT
Start: 2019-02-05

## 2019-02-05 RX ORDER — IPRATROPIUM BROMIDE AND ALBUTEROL 20; 100 UG/1; UG/1
SPRAY, METERED RESPIRATORY (INHALATION)
Refills: 8 | OUTPATIENT
Start: 2019-02-05

## 2019-02-18 RX ORDER — ALBUTEROL SULFATE 90 UG/1
AEROSOL, METERED RESPIRATORY (INHALATION)
Qty: 18 EACH | Refills: 3 | OUTPATIENT
Start: 2019-02-18

## 2019-04-09 ENCOUNTER — OFFICE VISIT (OUTPATIENT)
Dept: SURGERY | Facility: CLINIC | Age: 74
End: 2019-04-09
Payer: MEDICARE

## 2019-04-09 VITALS
HEART RATE: 55 BPM | HEIGHT: 63 IN | SYSTOLIC BLOOD PRESSURE: 129 MMHG | DIASTOLIC BLOOD PRESSURE: 57 MMHG | WEIGHT: 198 LBS | BODY MASS INDEX: 35.08 KG/M2 | TEMPERATURE: 98 F

## 2019-04-09 DIAGNOSIS — M53.3 SACRAL PAIN: Primary | ICD-10-CM

## 2019-04-09 PROCEDURE — 99203 PR OFFICE/OUTPT VISIT, NEW, LEVL III, 30-44 MIN: ICD-10-PCS | Mod: ,,, | Performed by: SURGERY

## 2019-04-09 PROCEDURE — 99203 OFFICE O/P NEW LOW 30 MIN: CPT | Mod: ,,, | Performed by: SURGERY

## 2019-04-09 NOTE — PROGRESS NOTES
"Subjective:       Patient ID: Marisol Kerr is a 73 y.o. female.    Chief Complaint: Consult (Referred by Dr. Dwyer to eval pilonidal cyst )      HPI:  Patient presents complaining of pain overlying her sacrum. She reports a fall in July. She was evaluated at [facility] emergency room with multiple imaging studies which did not show anything. She has continued to have pain in this location.About a month ago she reports "it busted and a ball fell into the toilet". She reports occasional swelling to her buttocks. She was seen in an urgent care and by her PCP and was told she has a pilonidal cyst. She denies any drainage presently but complains of ongoing pain.        Past Medical History:   Diagnosis Date    CAD (coronary artery disease)     Cancer     colon    CHF (congestive heart failure)     Colon cancer     COPD (chronic obstructive pulmonary disease)     Decreased hearing     Diabetes mellitus     Hypercholesterolemia     Hypertension     Hypoxemia     Insomnia     Obesity     Osteoarthritis      Past Surgical History:   Procedure Laterality Date    CHOLECYSTECTOMY      COLECTOMY      EXTERNAL EAR SURGERY       Review of patient's allergies indicates:   Allergen Reactions    Contrast media     Iodinated contrast- oral and iv dye     Strawberries [strawberry] Hives and Itching     Medication List with Changes/Refills   Current Medications    ACETAMINOPHEN (TYLENOL) 500 MG TABLET    Take 500 mg by mouth every 6 (six) hours as needed for Pain.    ADVAIR DISKUS 250-50 MCG/DOSE DISKUS INHALER    INHALE ONE DOSE BY MOUTH TWICE DAILY    ALBUTEROL (PROVENTIL) 2.5 MG /3 ML (0.083 %) NEBULIZER SOLUTION    USE ONE VIAL IN NEBULIZER EVERY 6 HOURS AS NEEDED FOR WHEEZING    ASPIRIN (ECOTRIN) 81 MG EC TABLET    Take 81 mg by mouth once daily.    BENAZEPRIL (LOTENSIN) 40 MG TABLET    Take 1 tablet (40 mg total) by mouth once daily.    CALCIUM CARBONATE/VITAMIN D3 (VITAMIN D-3 ORAL)    Take by mouth once daily. 1000 " IU daily    CICLOPIROX (LOPROX) 0.77 % CREA    Apply topically 2 (two) times daily.    COMBIVENT RESPIMAT  MCG/ACTUATION INHALER    INHALE TWO PUFFS BY MOUTH EVERY 4 HOURS AS NEEDED    CREON 36,000-114,000- 180,000 UNIT CPDR    Take 1 capsule by mouth 4 (four) times daily.    FLUTICASONE (FLONASE) 50 MCG/ACTUATION NASAL SPRAY    2 sprays (100 mcg total) by Each Nare route once daily.    FUROSEMIDE (LASIX) 20 MG TABLET        INCRUSE ELLIPTA 62.5 MCG/ACTUATION DSDV        LIPASE-PROTEASE-AMYLASE (CREON) 36,000-114,000- 180,000 UNIT CPDR    Creon 36,000 unit-114,000 unit-180,000 unit capsule,delayed release    LOPERAMIDE (IMODIUM) 2 MG CAPSULE    TAKE 1 CAPSULE BY MOUTH 4 TIMES DAILY AS NEEDED FOR DIARRHEA    LOVASTATIN (MEVACOR) 20 MG TABLET    Take 1 tablet by mouth once daily.    MELOXICAM (MOBIC) 7.5 MG TABLET    Take 1 tablet by mouth once daily.    METFORMIN (GLUCOPHAGE) 500 MG TABLET    TAKE ONE TABLET BY MOUTH ONCE DAILY WITH  BREKAFAST    METOPROLOL SUCCINATE (TOPROL-XL) 50 MG 24 HR TABLET    TAKE ONE TABLET BY MOUTH ONCE DAILY    MUPIROCIN (BACTROBAN) 2 % OINTMENT    Apply topically once daily.    NITROGLYCERIN 0.4 MG/DOSE TL SPRY (NITROLINGUAL) 400 MCG/SPRAY SPRAY    Place 1 spray under the tongue every 5 (five) minutes as needed for Chest pain (DO NOT EXCEED A TOTAL OF 3 SPRAYS IN 15 MINUTES).    ONDANSETRON (ZOFRAN-ODT) 4 MG TBDL    ondansetron 4 mg disintegrating tablet   DISSOLVE 2 TABLETS IN MOUTH EVERY 12 HOURS AS NEEDED FOR 10 DAYS    PANTOPRAZOLE (PROTONIX) 20 MG TABLET    Take 1 tablet (20 mg total) by mouth once daily.    PNV NO.115/IRON FUMARATE/FA (PRENATAL #115-IRON-FOLIC ACID ORAL)    Take by mouth.    PRAVASTATIN (PRAVACHOL) 40 MG TABLET    Take 40 mg by mouth once daily.     SPIRONOLACTONE (ALDACTONE) 25 MG TABLET    TAKE ONE TABLET BY MOUTH TWICE DAILY    TEMAZEPAM (RESTORIL) 15 MG CAP    Take 1 capsule by mouth once daily.    TRELEGY ELLIPTA 100-62.5-25 MCG DSDV    Take 1 puff by  mouth 2 (two) times daily.    VENTOLIN HFA 90 MCG/ACTUATION INHALER    INHALE TWO PUFFS BY MOUTH EVERY 6 HOURS AS NEEDED    VIT A/VIT C/VIT E/ZINC/COPPER (ICAPS AREDS ORAL)    Take 1 capsule by mouth 2 (two) times daily.    VITAMIN D2 50,000 UNIT CAPSULE    TAKE ONE CAPSULE BY MOUTH ONCE A WEEK     Family History   Problem Relation Age of Onset    Febrile seizures Mother     Heart failure Father      Social History     Socioeconomic History    Marital status:      Spouse name: Not on file    Number of children: Not on file    Years of education: Not on file    Highest education level: Not on file   Occupational History    Not on file   Social Needs    Financial resource strain: Not on file    Food insecurity:     Worry: Not on file     Inability: Not on file    Transportation needs:     Medical: Not on file     Non-medical: Not on file   Tobacco Use    Smoking status: Current Every Day Smoker     Packs/day: 3.00     Years: 50.00     Pack years: 150.00    Smokeless tobacco: Never Used   Substance and Sexual Activity    Alcohol use: Yes    Drug use: No    Sexual activity: Never   Lifestyle    Physical activity:     Days per week: Not on file     Minutes per session: Not on file    Stress: Not on file   Relationships    Social connections:     Talks on phone: Not on file     Gets together: Not on file     Attends Orthodoxy service: Not on file     Active member of club or organization: Not on file     Attends meetings of clubs or organizations: Not on file     Relationship status: Not on file   Other Topics Concern    Not on file   Social History Narrative    Not on file         Review of Systems   Constitutional: Negative for appetite change, chills, fever and unexpected weight change.   HENT: Negative for hearing loss, rhinorrhea, sore throat and voice change.    Eyes: Negative for photophobia and visual disturbance.   Respiratory: Negative for cough, choking and shortness of breath.     Cardiovascular: Negative for chest pain, palpitations and leg swelling.   Gastrointestinal: Negative for abdominal pain, blood in stool, constipation, diarrhea, nausea and vomiting.   Endocrine: Negative for cold intolerance, heat intolerance, polydipsia and polyuria.   Musculoskeletal: Negative for arthralgias, back pain, joint swelling and neck stiffness.   Skin: Negative for color change, pallor and rash.   Neurological: Negative for dizziness, seizures, syncope and headaches.   Hematological: Negative for adenopathy. Does not bruise/bleed easily.   Psychiatric/Behavioral: Negative for agitation, behavioral problems and confusion.       Objective:      Physical Exam   Constitutional: She appears well-developed and well-nourished.  Non-toxic appearance. No distress.   HENT:   Head: Normocephalic and atraumatic. Head is without abrasion and without laceration.   Right Ear: External ear normal.   Left Ear: External ear normal.   Nose: Nose normal.   Mouth/Throat: Oropharynx is clear and moist.   Eyes: Pupils are equal, round, and reactive to light. EOM are normal.   Neck: Trachea normal. No tracheal deviation and normal range of motion present. No thyroid mass and no thyromegaly present.   Cardiovascular: Normal rate and regular rhythm.   Pulmonary/Chest: Effort normal. No accessory muscle usage. No tachypnea. No respiratory distress.   Abdominal: Soft. Normal appearance and bowel sounds are normal. She exhibits no distension and no mass. There is no hepatosplenomegaly. There is no tenderness. There is no tenderness at McBurney's point and negative Mccullough's sign. No hernia.   Lymphadenopathy:     She has no cervical adenopathy.     She has no axillary adenopathy.        Right: No inguinal adenopathy present.        Left: No inguinal adenopathy present.   Neurological: She is alert. Coordination and gait normal.   Skin: Skin is warm and intact.        Area of concern is circled. I see no erythema, inflammation, or  swelling. There is very thin skin overlying the sacrum so the whole thing is hard. I do not see evidence of a pilonidal cyst presently.  I do see a small 3 mm area of skin sloughing possibly reflecting an early decubitus.   Psychiatric: She has a normal mood and affect. Her speech is normal and behavior is normal.       Assessment/Plan:   Sacral pain      A new onset pilonidal cyst in this age group would be highly unusual.   Presently, I do not see any evidence of a pilonidal cyst or anything that requires surgical intervention.    I advised the patient and her family with her that I am happy to reevaluate when it is inflamed but currently, I do not see anything that needs surgical attention.      Follow up for F/U - As needed.

## 2019-04-09 NOTE — LETTER
April 9, 2019      Khadar Dwyer MD  [address]  [address]  [place] [address]           [facility] - General Surgery  [address]  [place] [address]  Phone: [phone]  Fax: [phone]          Patient: Marisol Kerr   MR Number: [id number]   YOB: 1945   Date of Visit: 4/9/2019       Dear Dr. Khadar Dwyer:    Thank you for referring Marisol Kerr to me for evaluation. Attached you will find relevant portions of my assessment and plan of care.    If you have questions, please do not hesitate to call me. I look forward to following Marisol Kerr along with you.    Sincerely,    Aurora Alberts MD    Enclosure  CC:  No Recipients    If you would like to receive this communication electronically, please contact [email or web address] or [phone] to request more information on [employer] Link access.    For providers and/or their staff who would like to refer a patient to [facility], please contact us through our one-stop-shop provider referral line, [facility], at [phone].    If you feel you have received this communication in error or would no longer like to receive these types of communications, please e-mail [email or web address]

## 2019-05-27 RX ORDER — PANTOPRAZOLE SODIUM 20 MG/1
TABLET, DELAYED RELEASE ORAL
Qty: 90 TABLET | Refills: 0 | OUTPATIENT
Start: 2019-05-27

## 2019-05-29 RX ORDER — PANTOPRAZOLE SODIUM 20 MG/1
TABLET, DELAYED RELEASE ORAL
Qty: 90 TABLET | Refills: 0 | OUTPATIENT
Start: 2019-05-29

## 2019-07-01 RX ORDER — IPRATROPIUM BROMIDE AND ALBUTEROL 20; 100 UG/1; UG/1
SPRAY, METERED RESPIRATORY (INHALATION)
Refills: 8 | OUTPATIENT
Start: 2019-07-01

## 2019-07-01 RX ORDER — ALBUTEROL SULFATE 90 UG/1
AEROSOL, METERED RESPIRATORY (INHALATION)
Qty: 18 EACH | Refills: 3 | OUTPATIENT
Start: 2019-07-01

## 2019-07-29 RX ORDER — KETOCONAZOLE 20 MG/G
CREAM TOPICAL
COMMUNITY
End: 2019-09-16

## 2019-07-29 RX ORDER — HYDROCODONE BITARTRATE AND ACETAMINOPHEN 5; 325 MG/1; MG/1
TABLET ORAL
Refills: 0 | COMMUNITY
Start: 2019-05-10 | End: 2019-07-30

## 2019-07-29 RX ORDER — NYSTATIN 100000 [USP'U]/G
POWDER TOPICAL
COMMUNITY
End: 2019-07-30

## 2019-07-29 RX ORDER — CEPHALEXIN 500 MG/1
CAPSULE ORAL
COMMUNITY
End: 2019-07-30

## 2019-07-30 ENCOUNTER — OFFICE VISIT (OUTPATIENT)
Dept: PULMONOLOGY | Facility: CLINIC | Age: 74
End: 2019-07-30
Payer: MEDICARE

## 2019-07-30 ENCOUNTER — LAB VISIT (OUTPATIENT)
Dept: LAB | Facility: HOSPITAL | Age: 74
End: 2019-07-30
Attending: INTERNAL MEDICINE
Payer: MEDICARE

## 2019-07-30 VITALS
DIASTOLIC BLOOD PRESSURE: 75 MMHG | BODY MASS INDEX: 34.02 KG/M2 | WEIGHT: 192 LBS | HEIGHT: 63 IN | SYSTOLIC BLOOD PRESSURE: 120 MMHG | HEART RATE: 71 BPM | OXYGEN SATURATION: 92 %

## 2019-07-30 DIAGNOSIS — J44.9 CHRONIC OBSTRUCTIVE PULMONARY DISEASE, UNSPECIFIED COPD TYPE: Primary | ICD-10-CM

## 2019-07-30 DIAGNOSIS — J96.11 CHRONIC HYPOXEMIC RESPIRATORY FAILURE: ICD-10-CM

## 2019-07-30 DIAGNOSIS — D64.9 ANEMIA, UNSPECIFIED: Primary | ICD-10-CM

## 2019-07-30 DIAGNOSIS — R91.1 LUNG NODULE: ICD-10-CM

## 2019-07-30 DIAGNOSIS — E78.5 HYPERLIPEMIA: ICD-10-CM

## 2019-07-30 DIAGNOSIS — I50.9 HEART FAILURE, UNSPECIFIED: ICD-10-CM

## 2019-07-30 DIAGNOSIS — E03.9 MYXEDEMA HEART DISEASE: ICD-10-CM

## 2019-07-30 DIAGNOSIS — I51.9 MYXEDEMA HEART DISEASE: ICD-10-CM

## 2019-07-30 LAB
ALBUMIN SERPL BCP-MCNC: 3.9 G/DL (ref 3.5–5.2)
ALP SERPL-CCNC: 52 U/L (ref 55–135)
ALT SERPL W/O P-5'-P-CCNC: 16 U/L (ref 10–44)
ANION GAP SERPL CALC-SCNC: 6 MMOL/L (ref 8–16)
AST SERPL-CCNC: 19 U/L (ref 10–40)
BASOPHILS # BLD AUTO: 0.04 K/UL (ref 0–0.2)
BASOPHILS NFR BLD: 0.6 % (ref 0–1.9)
BILIRUB SERPL-MCNC: 0.6 MG/DL (ref 0.1–1)
BUN SERPL-MCNC: 40 MG/DL (ref 8–23)
CALCIUM SERPL-MCNC: 9.9 MG/DL (ref 8.7–10.5)
CHLORIDE SERPL-SCNC: 104 MMOL/L (ref 95–110)
CHOLEST SERPL-MCNC: 164 MG/DL (ref 120–199)
CHOLEST/HDLC SERPL: 4.1 {RATIO} (ref 2–5)
CO2 SERPL-SCNC: 34 MMOL/L (ref 23–29)
CREAT SERPL-MCNC: 1.5 MG/DL (ref 0.5–1.4)
DIFFERENTIAL METHOD: ABNORMAL
EOSINOPHIL # BLD AUTO: 0.1 K/UL (ref 0–0.5)
EOSINOPHIL NFR BLD: 1.1 % (ref 0–8)
ERYTHROCYTE [DISTWIDTH] IN BLOOD BY AUTOMATED COUNT: 12.7 % (ref 11.5–14.5)
EST. GFR  (AFRICAN AMERICAN): 39.8 ML/MIN/1.73 M^2
EST. GFR  (NON AFRICAN AMERICAN): 34.6 ML/MIN/1.73 M^2
GLUCOSE SERPL-MCNC: 94 MG/DL (ref 70–110)
HCT VFR BLD AUTO: 39.9 % (ref 37–48.5)
HDLC SERPL-MCNC: 40 MG/DL (ref 40–75)
HDLC SERPL: 24.4 % (ref 20–50)
HGB BLD-MCNC: 12.1 G/DL (ref 12–16)
IMM GRANULOCYTES # BLD AUTO: 0.03 K/UL (ref 0–0.04)
IMM GRANULOCYTES NFR BLD AUTO: 0.4 % (ref 0–0.5)
LDLC SERPL CALC-MCNC: 89.6 MG/DL (ref 63–159)
LYMPHOCYTES # BLD AUTO: 1.5 K/UL (ref 1–4.8)
LYMPHOCYTES NFR BLD: 20.9 % (ref 18–48)
MCH RBC QN AUTO: 28.1 PG (ref 27–31)
MCHC RBC AUTO-ENTMCNC: 30.3 G/DL (ref 32–36)
MCV RBC AUTO: 93 FL (ref 82–98)
MONOCYTES # BLD AUTO: 0.5 K/UL (ref 0.3–1)
MONOCYTES NFR BLD: 6.5 % (ref 4–15)
NEUTROPHILS # BLD AUTO: 5 K/UL (ref 1.8–7.7)
NEUTROPHILS NFR BLD: 70.5 % (ref 38–73)
NONHDLC SERPL-MCNC: 124 MG/DL
NRBC BLD-RTO: 0 /100 WBC
PLATELET # BLD AUTO: 152 K/UL (ref 150–350)
PMV BLD AUTO: 11.8 FL (ref 9.2–12.9)
POTASSIUM SERPL-SCNC: 4.7 MMOL/L (ref 3.5–5.1)
PROT SERPL-MCNC: 6.6 G/DL (ref 6–8.4)
RBC # BLD AUTO: 4.31 M/UL (ref 4–5.4)
SODIUM SERPL-SCNC: 144 MMOL/L (ref 136–145)
TRIGL SERPL-MCNC: 172 MG/DL (ref 30–150)
TSH SERPL DL<=0.005 MIU/L-ACNC: 0.55 UIU/ML (ref 0.34–5.6)
WBC # BLD AUTO: 7.04 K/UL (ref 3.9–12.7)

## 2019-07-30 PROCEDURE — 80053 COMPREHEN METABOLIC PANEL: CPT

## 2019-07-30 PROCEDURE — 80061 LIPID PANEL: CPT

## 2019-07-30 PROCEDURE — 85025 COMPLETE CBC W/AUTO DIFF WBC: CPT

## 2019-07-30 PROCEDURE — 99214 OFFICE O/P EST MOD 30 MIN: CPT | Mod: S$GLB,ICN,, | Performed by: NURSE PRACTITIONER

## 2019-07-30 PROCEDURE — 99214 PR OFFICE/OUTPT VISIT, EST, LEVL IV, 30-39 MIN: ICD-10-PCS | Mod: S$GLB,ICN,, | Performed by: NURSE PRACTITIONER

## 2019-07-30 PROCEDURE — 84443 ASSAY THYROID STIM HORMONE: CPT

## 2019-07-30 NOTE — PATIENT INSTRUCTIONS
Treatment for COPD    Your healthcare provider will prescribe the best treatments for your COPD.  Treatment  Recommendations include the following:  · Medicines. Some medicines help relieve symptoms when you have them. Others are taken daily to control inflammation in the lungs. Always take your medicines as prescribed. Learn the names of your medicines, as well as how and when to use them.  · Oxygen therapy. Oxygen may be prescribed if tests show that your blood contains too little oxygen.  · Smoking. If you smoke, quit. Smoking is the main cause of COPD. Quitting will help you be able to better manage your COPD. Ask your healthcare provider about ways to help you quit smoking.  · Avoiding infections. Infections, like a cold or the flu, can cause your symptoms to worsen. Try to stay away from people who are sick. Wash your hands often. And, ask your healthcare provider about vaccines for the flu and pneumonia.  Coping with shortness of breath  Coping tips include the following:  · Exercise. Try to be as active as possible. This will improve energy levels and strengthen your muscles, so you can do more.  · Breathing techniques. Ask your healthcare provider or nurse to show you how to do pursed-lip breathing.  · Balance rest and activity. Each day, try to balance rest periods with activity. For example, you might start the day with getting dressed and eating breakfast, then relax and read the paper. After that, take a brief walk. And then sit with your feet up for a while.  · Pulmonary rehabilitation. Ask your provider, or call your local hospital to find out about pulmonary rehab programs. The programs help with managing your disease, breathing techniques, exercise, support and counseling.  · Healthy eating. Eating a healthy, balanced diet and making an effort to maintain your ideal weight are important to staying as healthy as possible. Make sure you have a lot of fruit and vegetables every day, as well as  balanced portions of whole grains, lean meats and fish, and low-fat dairy products.  Date Last Reviewed: 5/1/2016  © 7564-9556 [employer] [address] [place] [address], [place] [address]. All rights reserved. This information is not intended as a substitute for professional medical care. Always follow your healthcare professional's instructions.      Use your Advair twice a day, rinse after you use it  Use your Incruse 1 puff daily  Oxygen all the time  Call if you get sick  Keep losing weight

## 2019-07-30 NOTE — PROGRESS NOTES
SUBJECTIVE:    Patient ID: Marisol Kerr is a 74 y.o. female.    Chief Complaint: COPD and Hypoxia    HPI   Patient here today feeling well with her daughter.  She is wearing her oxygen all of the time. She is not using her Advair regularly because she thought it was as needed. She is using her Incruse daily.  She is still trying to lose weight.  She did have a table fall on her in May states she went to ER to be evaluated because she is on blood thinners.   Past Medical History:   Diagnosis Date    CAD (coronary artery disease)     Cancer     colon    CHF (congestive heart failure)     Colon cancer     COPD (chronic obstructive pulmonary disease)     Decreased hearing     Diabetes mellitus     Hypercholesterolemia     Hypertension     Hypoxemia     Insomnia     Obesity     Osteoarthritis      Past Surgical History:   Procedure Laterality Date    CHOLECYSTECTOMY      COLECTOMY      EXTERNAL EAR SURGERY       Family History   Problem Relation Age of Onset    Febrile seizures Mother     Heart failure Father         Social History:   Marital Status:   Occupation: Data Unavailable  Alcohol History:  reports that she drinks alcohol.  Tobacco History:  reports that she quit smoking about 13 years ago. Her smoking use included cigarettes. She started smoking about 55 years ago. She has a 150.00 pack-year smoking history. She has never used smokeless tobacco.  Drug History:  reports that she does not use drugs.    Review of patient's allergies indicates:   Allergen Reactions    Contrast media     Strawberries [strawberry] Hives and Itching       Current Outpatient Medications   Medication Sig Dispense Refill    acetaminophen (TYLENOL) 500 MG tablet Take 500 mg by mouth every 6 (six) hours as needed for Pain.      ADVAIR DISKUS 250-50 mcg/dose diskus inhaler INHALE ONE DOSE BY MOUTH TWICE DAILY 60 each 2    albuterol (PROVENTIL) 2.5 mg /3 mL (0.083 %) nebulizer solution USE ONE VIAL IN  NEBULIZER EVERY 6 HOURS AS NEEDED FOR WHEEZING 150 each 2    aspirin (ECOTRIN) 81 MG EC tablet Take 81 mg by mouth once daily.      benazepril (LOTENSIN) 40 MG tablet Take 1 tablet (40 mg total) by mouth once daily. 90 tablet 1    CALCIUM CARBONATE/VITAMIN D3 (VITAMIN D-3 ORAL) Take by mouth once daily. 1000 IU daily      COMBIVENT RESPIMAT  mcg/actuation inhaler INHALE TWO PUFFS BY MOUTH EVERY 4 HOURS AS NEEDED 3 Package 2    CREON 36,000-114,000- 180,000 unit CpDR Take 1 capsule by mouth 4 (four) times daily.      fluticasone (FLONASE) 50 mcg/actuation nasal spray 2 sprays (100 mcg total) by Each Nare route once daily. 16 g 3    furosemide (LASIX) 20 MG tablet       INCRUSE ELLIPTA 62.5 mcg/actuation DsDv       ketoconazole (NIZORAL) 2 % cream ketoconazole 2 % topical cream      lipase-protease-amylase (CREON) 36,000-114,000- 180,000 unit CpDR Creon 36,000 unit-114,000 unit-180,000 unit capsule,delayed release      loperamide (IMODIUM) 2 mg capsule TAKE 1 CAPSULE BY MOUTH 4 TIMES DAILY AS NEEDED FOR DIARRHEA 30 capsule 0    lovastatin (MEVACOR) 20 MG tablet Take 1 tablet by mouth once daily.      meloxicam (MOBIC) 7.5 MG tablet Take 1 tablet by mouth once daily.      metFORMIN (GLUCOPHAGE) 500 MG tablet TAKE ONE TABLET BY MOUTH ONCE DAILY WITH  BREKAFAST 90 tablet 3    metoprolol succinate (TOPROL-XL) 50 MG 24 hr tablet TAKE ONE TABLET BY MOUTH ONCE DAILY 90 tablet 3    mupirocin (BACTROBAN) 2 % ointment Apply topically once daily. 30 g 0    nitroGLYCERIN 0.4 MG/DOSE TL SPRY (NITROLINGUAL) 400 mcg/spray spray Place 1 spray under the tongue every 5 (five) minutes as needed for Chest pain (DO NOT EXCEED A TOTAL OF 3 SPRAYS IN 15 MINUTES). 4.9 g 0    ondansetron (ZOFRAN-ODT) 4 MG TbDL ondansetron 4 mg disintegrating tablet   DISSOLVE 2 TABLETS IN MOUTH EVERY 12 HOURS AS NEEDED FOR 10 DAYS      pantoprazole (PROTONIX) 20 MG tablet Take 1 tablet (20 mg total) by mouth once daily. 90 tablet 0  "   PNV NO.115/IRON FUMARATE/FA (PRENATAL #115-IRON-FOLIC ACID ORAL) Take by mouth.      VENTOLIN HFA 90 mcg/actuation inhaler INHALE TWO PUFFS BY MOUTH EVERY 6 HOURS AS NEEDED 18 each 3    VIT A/VIT C/VIT E/ZINC/COPPER (ICAPS AREDS ORAL) Take 1 capsule by mouth 2 (two) times daily.      VITAMIN D2 50,000 unit capsule TAKE ONE CAPSULE BY MOUTH ONCE A WEEK 12 capsule 0     No current facility-administered medications for this visit.          Last PFT:  1/2018   Last CT:05/2018    Review of Systems  General: Feeling Well.  Eyes: Vision is good.  ENT:  Left ear hurts    Heart:: No chest pain or palpitations.  Lungs: breathing is stable  GI: No Nausea, vomiting, constipation, diarrhea, or reflux.  : No dysuria, hesitancy, or nocturia.  Musculoskeletal: No joint pain or myalgias.  Skin: No lesions or rashes.  Neuro: No headaches or neuropathy.  Lymph: swelling to legs  Psych: No anxiety or depression.  Endo: weight loss from diet     OBJECTIVE:      /75 (BP Location: Left arm, Patient Position: Sitting, BP Method: Medium (Manual))   Pulse 71   Ht 5' 3" (1.6 m)   Wt 87.1 kg (192 lb)   SpO2 (!) 92% Comment: 2LPM  BMI 34.01 kg/m²     Physical Exam  GENERAL: Older patient in no distress.  HEENT: Pupils equal and reactive. Extraocular movements intact. Nose intact.      Pharynx moist.  Erythema to left external auditory canal, no drainage, good light reflex   NECK: Supple.   HEART: Regular rate and rhythm. No murmur or gallop auscultated.  LUNGS: Clear but decreased throughout  to auscultation and percussion. Lung excursion symmetrical. No change in fremitus. No adventitial noises.  ABDOMEN: Bowel sounds present. Non-tender, no masses palpated.  EXTREMITIES: Normal muscle tone and joint movement, no cyanosis or clubbing.   LYMPHATICS: No adenopathy palpated, 1+ pitting edema to legs and feet  SKIN: Dry, intact, no lesions.   NEURO: Cranial nerves II-XII intact. Motor strength 5/5 bilaterally, upper and lower " extremities.  PSYCH: Appropriate affect.    Assessment:       1. Chronic obstructive pulmonary disease, unspecified COPD type    2. Chronic hypoxemic respiratory failure    3. Lung nodule          Plan:       Chronic obstructive pulmonary disease, unspecified COPD type    Chronic hypoxemic respiratory failure    Lung nodule         Use your Advair twice a day, rinse after you use it  Use your Incruse 1 puff daily  Oxygen all the time  Call if you get sick  Keep losing weight      Follow up in about 6 months (around 1/30/2020).

## 2019-08-16 DIAGNOSIS — R60.0 LOCALIZED EDEMA: Primary | ICD-10-CM

## 2019-08-22 ENCOUNTER — HOSPITAL ENCOUNTER (OUTPATIENT)
Dept: RADIOLOGY | Facility: HOSPITAL | Age: 74
Discharge: HOME | End: 2019-08-22
Attending: INTERNAL MEDICINE
Payer: MEDICARE

## 2019-08-22 DIAGNOSIS — R60.0 LOCALIZED EDEMA: ICD-10-CM

## 2019-08-22 PROCEDURE — 93970 EXTREMITY STUDY: CPT | Mod: TC

## 2019-09-10 ENCOUNTER — OFFICE VISIT (OUTPATIENT)
Dept: FAMILY MEDICINE | Facility: CLINIC | Age: 74
End: 2019-09-10
Payer: MEDICARE

## 2019-09-10 VITALS
SYSTOLIC BLOOD PRESSURE: 132 MMHG | WEIGHT: 190 LBS | HEART RATE: 76 BPM | BODY MASS INDEX: 35.87 KG/M2 | HEIGHT: 61 IN | DIASTOLIC BLOOD PRESSURE: 76 MMHG

## 2019-09-10 DIAGNOSIS — J44.9 CHRONIC OBSTRUCTIVE PULMONARY DISEASE, UNSPECIFIED COPD TYPE: ICD-10-CM

## 2019-09-10 DIAGNOSIS — I10 ESSENTIAL HYPERTENSION, BENIGN: ICD-10-CM

## 2019-09-10 DIAGNOSIS — T16.1XXA FOREIGN BODY IN EAR, RIGHT, INITIAL ENCOUNTER: ICD-10-CM

## 2019-09-10 DIAGNOSIS — Z12.39 SCREENING BREAST EXAMINATION: Primary | ICD-10-CM

## 2019-09-10 DIAGNOSIS — H91.90 HEARING LOSS, UNSPECIFIED HEARING LOSS TYPE, UNSPECIFIED LATERALITY: ICD-10-CM

## 2019-09-10 PROCEDURE — 99213 PR OFFICE/OUTPT VISIT, EST, LEVL III, 20-29 MIN: ICD-10-PCS | Mod: S$GLB,,, | Performed by: NURSE PRACTITIONER

## 2019-09-10 PROCEDURE — 99213 OFFICE O/P EST LOW 20 MIN: CPT | Mod: S$GLB,,, | Performed by: NURSE PRACTITIONER

## 2019-09-10 NOTE — PROGRESS NOTES
" Patient ID: Marisol Kerr is a 74 y.o. female.    Chief Complaint: Foreign Body in Ear    HPIPresents with complaints of "something' stuck in this ear this am. Was trying to place hearing aide and reports that it 'got' stuck.         Past Medical History:   Diagnosis Date    CAD (coronary artery disease)     Cancer     colon    CHF (congestive heart failure)     Colon cancer     COPD (chronic obstructive pulmonary disease)     Decreased hearing     Diabetes mellitus     Diabetes mellitus, type 2     Hypercholesterolemia     Hypertension     Hypoxemia     Insomnia     Obesity     Osteoarthritis      Past Surgical History:   Procedure Laterality Date    CHOLECYSTECTOMY      COLECTOMY      EXTERNAL EAR SURGERY           Tobacco History:  reports that she quit smoking about 13 years ago. Her smoking use included cigarettes. She started smoking about 55 years ago. She has a 150.00 pack-year smoking history. She has never used smokeless tobacco.      Review of patient's allergies indicates:   Allergen Reactions    Iodinated contrast media     Strawberries [strawberry] Hives and Itching       Current Outpatient Medications:     acetaminophen (TYLENOL) 500 MG tablet, Take 500 mg by mouth every 6 (six) hours as needed for Pain., Disp: , Rfl:     ADVAIR DISKUS 250-50 mcg/dose diskus inhaler, INHALE ONE DOSE BY MOUTH TWICE DAILY, Disp: 60 each, Rfl: 2    albuterol (PROVENTIL) 2.5 mg /3 mL (0.083 %) nebulizer solution, USE ONE VIAL IN NEBULIZER EVERY 6 HOURS AS NEEDED FOR WHEEZING, Disp: 150 each, Rfl: 2    aspirin (ECOTRIN) 81 MG EC tablet, Take 81 mg by mouth once daily., Disp: , Rfl:     benazepril (LOTENSIN) 40 MG tablet, Take 1 tablet (40 mg total) by mouth once daily., Disp: 90 tablet, Rfl: 1    CALCIUM CARBONATE/VITAMIN D3 (VITAMIN D-3 ORAL), Take by mouth once daily. 1000 IU daily, Disp: , Rfl:     COMBIVENT RESPIMAT  mcg/actuation inhaler, INHALE TWO PUFFS BY MOUTH EVERY 4 HOURS AS " NEEDED, Disp: 3 Package, Rfl: 2    CREON 36,000-114,000- 180,000 unit CpDR, Take 1 capsule by mouth 4 (four) times daily., Disp: , Rfl:     fluticasone (FLONASE) 50 mcg/actuation nasal spray, 2 sprays (100 mcg total) by Each Nare route once daily., Disp: 16 g, Rfl: 3    furosemide (LASIX) 20 MG tablet, , Disp: , Rfl:     INCRUSE ELLIPTA 62.5 mcg/actuation DsDv, , Disp: , Rfl:     ketoconazole (NIZORAL) 2 % cream, ketoconazole 2 % topical cream, Disp: , Rfl:     lipase-protease-amylase (CREON) 36,000-114,000- 180,000 unit CpDR, Creon 36,000 unit-114,000 unit-180,000 unit capsule,delayed release, Disp: , Rfl:     loperamide (IMODIUM) 2 mg capsule, TAKE 1 CAPSULE BY MOUTH 4 TIMES DAILY AS NEEDED FOR DIARRHEA, Disp: 30 capsule, Rfl: 0    lovastatin (MEVACOR) 20 MG tablet, Take 1 tablet by mouth once daily., Disp: , Rfl:     meloxicam (MOBIC) 7.5 MG tablet, Take 1 tablet by mouth once daily., Disp: , Rfl:     metFORMIN (GLUCOPHAGE) 500 MG tablet, TAKE ONE TABLET BY MOUTH ONCE DAILY WITH  BREKAFAST, Disp: 90 tablet, Rfl: 3    metoprolol succinate (TOPROL-XL) 50 MG 24 hr tablet, TAKE ONE TABLET BY MOUTH ONCE DAILY, Disp: 90 tablet, Rfl: 3    mupirocin (BACTROBAN) 2 % ointment, Apply topically once daily., Disp: 30 g, Rfl: 0    nitroGLYCERIN 0.4 MG/DOSE TL SPRY (NITROLINGUAL) 400 mcg/spray spray, Place 1 spray under the tongue every 5 (five) minutes as needed for Chest pain (DO NOT EXCEED A TOTAL OF 3 SPRAYS IN 15 MINUTES)., Disp: 4.9 g, Rfl: 0    ondansetron (ZOFRAN-ODT) 4 MG TbDL, ondansetron 4 mg disintegrating tablet  DISSOLVE 2 TABLETS IN MOUTH EVERY 12 HOURS AS NEEDED FOR 10 DAYS, Disp: , Rfl:     pantoprazole (PROTONIX) 20 MG tablet, Take 1 tablet (20 mg total) by mouth once daily., Disp: 90 tablet, Rfl: 0    PNV NO.115/IRON FUMARATE/FA (PRENATAL #115-IRON-FOLIC ACID ORAL), Take by mouth., Disp: , Rfl:     VENTOLIN HFA 90 mcg/actuation inhaler, INHALE TWO PUFFS BY MOUTH EVERY 6 HOURS AS NEEDED, Disp:  "18 each, Rfl: 3    VIT A/VIT C/VIT E/ZINC/COPPER (ICAPS AREDS ORAL), Take 1 capsule by mouth 2 (two) times daily., Disp: , Rfl:     VITAMIN D2 50,000 unit capsule, TAKE ONE CAPSULE BY MOUTH ONCE A WEEK, Disp: 12 capsule, Rfl: 0    Review of Systems   Constitutional: Negative for chills and fever.   HENT: Positive for ear pain. Negative for congestion, ear discharge, sinus pressure and sore throat.    Eyes: Negative for discharge.   Respiratory: Positive for shortness of breath (chronic). Negative for cough.    Cardiovascular: Negative for chest pain and leg swelling.          Objective:      Vitals:    09/10/19 1239   BP: 132/76   Pulse: 76   Weight: 86.2 kg (190 lb)   Height: 5' 0.5" (1.537 m)     Physical Exam   Constitutional: She appears well-developed and well-nourished. She does not appear ill.   In wheelchair   HENT:   Mouth/Throat: Oropharynx is clear and moist and mucous membranes are normal. No tonsillar exudate.   Right ear with hearing aide visualized   Cardiovascular: Normal rate, regular rhythm and normal heart sounds.   Pulmonary/Chest: Breath sounds normal.   Dependent on o2   Lymphadenopathy:     She has no cervical adenopathy.         Assessment:       1. Screening breast examination    2. Foreign body in ear, right, initial encounter    3. Essential hypertension, benign    4. Chronic obstructive pulmonary disease, unspecified COPD type    5. Hearing loss, unspecified hearing loss type, unspecified laterality           Plan:       Screening breast examination  -     Mammo Digital Screening Bilat; Future; Expected date: 09/10/2019    Foreign body in ear, right, initial encounter  Comments:  hearing aide removed  Orders:  -     Ear Cerumen Removal    Essential hypertension, benign    Chronic obstructive pulmonary disease, unspecified COPD type    Hearing loss, unspecified hearing loss type, unspecified laterality      Follow up if symptoms worsen or fail to improve.        9/10/2019 Monie Cain, " NP

## 2019-09-10 NOTE — PROCEDURES
Ear Cerumen Removal  Date/Time: 9/10/2019 1:14 PM  Performed by: Monie Cain NP  Authorized by: Monie Cani NP     Consent Done?:  Not Needed  Location details:  Right ear  Patient tolerance:  Patient tolerated the procedure well with no immediate complications

## 2019-09-16 ENCOUNTER — HOSPITAL ENCOUNTER (INPATIENT)
Facility: HOSPITAL | Age: 74
LOS: 8 days | Discharge: HOME | DRG: 393 | End: 2019-09-25
Attending: EMERGENCY MEDICINE | Admitting: FAMILY MEDICINE
Payer: MEDICARE

## 2019-09-16 ENCOUNTER — TELEPHONE (OUTPATIENT)
Dept: FAMILY MEDICINE | Facility: CLINIC | Age: 74
End: 2019-09-16

## 2019-09-16 DIAGNOSIS — Z85.038 HISTORY OF COLON CANCER: ICD-10-CM

## 2019-09-16 DIAGNOSIS — R19.7 DIARRHEA, UNSPECIFIED TYPE: ICD-10-CM

## 2019-09-16 DIAGNOSIS — E55.9 VITAMIN D DEFICIENCY: ICD-10-CM

## 2019-09-16 DIAGNOSIS — R19.7 DIARRHEA: ICD-10-CM

## 2019-09-16 DIAGNOSIS — R06.02 SHORTNESS OF BREATH: ICD-10-CM

## 2019-09-16 DIAGNOSIS — R07.9 CHEST PAIN, UNSPECIFIED TYPE: ICD-10-CM

## 2019-09-16 DIAGNOSIS — I50.31 ACUTE DIASTOLIC CONGESTIVE HEART FAILURE: Chronic | ICD-10-CM

## 2019-09-16 DIAGNOSIS — K85.00 IDIOPATHIC ACUTE PANCREATITIS WITHOUT INFECTION OR NECROSIS: Primary | ICD-10-CM

## 2019-09-16 LAB
BASOPHILS # BLD AUTO: 0.03 K/UL (ref 0–0.2)
BASOPHILS NFR BLD: 0.2 % (ref 0–1.9)
DIFFERENTIAL METHOD: ABNORMAL
EOSINOPHIL # BLD AUTO: 0 K/UL (ref 0–0.5)
EOSINOPHIL NFR BLD: 0.1 % (ref 0–8)
ERYTHROCYTE [DISTWIDTH] IN BLOOD BY AUTOMATED COUNT: 12.8 % (ref 11.5–14.5)
HCT VFR BLD AUTO: 46.3 % (ref 37–48.5)
HGB BLD-MCNC: 14.4 G/DL (ref 12–16)
IMM GRANULOCYTES # BLD AUTO: 0.05 K/UL (ref 0–0.04)
IMM GRANULOCYTES NFR BLD AUTO: 0.3 % (ref 0–0.5)
INR PPP: 0.9
LIPASE SERPL-CCNC: 201 U/L (ref 4–60)
LYMPHOCYTES # BLD AUTO: 0.4 K/UL (ref 1–4.8)
LYMPHOCYTES NFR BLD: 2.3 % (ref 18–48)
MCH RBC QN AUTO: 28.3 PG (ref 27–31)
MCHC RBC AUTO-ENTMCNC: 31.1 G/DL (ref 32–36)
MCV RBC AUTO: 91 FL (ref 82–98)
MONOCYTES # BLD AUTO: 0.6 K/UL (ref 0.3–1)
MONOCYTES NFR BLD: 3.4 % (ref 4–15)
NEUTROPHILS # BLD AUTO: 15.6 K/UL (ref 1.8–7.7)
NEUTROPHILS NFR BLD: 93.7 % (ref 38–73)
NRBC BLD-RTO: 0 /100 WBC
PLATELET # BLD AUTO: 204 K/UL (ref 150–350)
PMV BLD AUTO: 12.1 FL (ref 9.2–12.9)
PROTHROMBIN TIME: 12.2 SEC (ref 11.7–14)
RBC # BLD AUTO: 5.09 M/UL (ref 4–5.4)
WBC # BLD AUTO: 16.6 K/UL (ref 3.9–12.7)

## 2019-09-16 PROCEDURE — 85610 PROTHROMBIN TIME: CPT

## 2019-09-16 PROCEDURE — 83690 ASSAY OF LIPASE: CPT

## 2019-09-16 PROCEDURE — 96374 THER/PROPH/DIAG INJ IV PUSH: CPT

## 2019-09-16 PROCEDURE — 85025 COMPLETE CBC W/AUTO DIFF WBC: CPT

## 2019-09-16 PROCEDURE — 84484 ASSAY OF TROPONIN QUANT: CPT

## 2019-09-16 PROCEDURE — 99285 EMERGENCY DEPT VISIT HI MDM: CPT | Mod: 25

## 2019-09-16 PROCEDURE — 93005 ELECTROCARDIOGRAM TRACING: CPT

## 2019-09-16 PROCEDURE — 83880 ASSAY OF NATRIURETIC PEPTIDE: CPT

## 2019-09-16 PROCEDURE — 96361 HYDRATE IV INFUSION ADD-ON: CPT

## 2019-09-16 PROCEDURE — 80053 COMPREHEN METABOLIC PANEL: CPT

## 2019-09-16 RX ORDER — ALBUTEROL SULFATE 90 UG/1
2 AEROSOL, METERED RESPIRATORY (INHALATION) EVERY 6 HOURS PRN
COMMUNITY
End: 2020-02-19 | Stop reason: SDUPTHER

## 2019-09-16 RX ORDER — ONDANSETRON 4 MG/1
4 TABLET, ORALLY DISINTEGRATING ORAL EVERY 6 HOURS PRN
COMMUNITY
End: 2020-12-07 | Stop reason: SDUPTHER

## 2019-09-16 RX ORDER — LOPERAMIDE HYDROCHLORIDE 2 MG/1
2 CAPSULE ORAL 4 TIMES DAILY
Qty: 90 CAPSULE | Refills: 0 | Status: SHIPPED | OUTPATIENT
Start: 2019-09-16 | End: 2019-11-12 | Stop reason: SDUPTHER

## 2019-09-16 RX ORDER — FLUTICASONE PROPIONATE AND SALMETEROL 250; 50 UG/1; UG/1
1 POWDER RESPIRATORY (INHALATION) 2 TIMES DAILY
Qty: 3 EACH | Refills: 1 | Status: SHIPPED | OUTPATIENT
Start: 2019-09-16 | End: 2020-06-18 | Stop reason: SDUPTHER

## 2019-09-16 RX ORDER — METFORMIN HYDROCHLORIDE 500 MG/1
500 TABLET ORAL
COMMUNITY
End: 2019-09-26 | Stop reason: SDUPTHER

## 2019-09-16 RX ORDER — ALBUTEROL SULFATE 90 UG/1
AEROSOL, METERED RESPIRATORY (INHALATION)
Qty: 18 EACH | Refills: 3 | OUTPATIENT
Start: 2019-09-16

## 2019-09-16 RX ORDER — NITROGLYCERIN 40 MG/1
1 PATCH TRANSDERMAL EVERY MORNING
COMMUNITY
End: 2020-12-31

## 2019-09-16 RX ORDER — METOPROLOL SUCCINATE 50 MG/1
50 TABLET, EXTENDED RELEASE ORAL EVERY MORNING
COMMUNITY
End: 2019-09-26 | Stop reason: SDUPTHER

## 2019-09-16 RX ORDER — AMOXICILLIN 500 MG
2 CAPSULE ORAL EVERY MORNING
COMMUNITY

## 2019-09-16 RX ORDER — ERGOCALCIFEROL 1.25 MG/1
50000 CAPSULE ORAL
Qty: 12 CAPSULE | Refills: 0 | Status: SHIPPED | OUTPATIENT
Start: 2019-09-16 | End: 2019-12-04 | Stop reason: SDUPTHER

## 2019-09-16 RX ORDER — METOPROLOL SUCCINATE 50 MG/1
TABLET, EXTENDED RELEASE ORAL
Qty: 90 TABLET | Refills: 3 | OUTPATIENT
Start: 2019-09-16

## 2019-09-16 RX ORDER — CHOLECALCIFEROL (VITAMIN D3) 25 MCG
1000 TABLET ORAL EVERY MORNING
COMMUNITY
End: 2019-09-26 | Stop reason: SDUPTHER

## 2019-09-16 RX ORDER — IPRATROPIUM BROMIDE AND ALBUTEROL 20; 100 UG/1; UG/1
SPRAY, METERED RESPIRATORY (INHALATION)
Refills: 5 | OUTPATIENT
Start: 2019-09-16

## 2019-09-16 RX ORDER — MELOXICAM 7.5 MG/1
7.5 TABLET ORAL DAILY
Qty: 90 TABLET | Refills: 1 | Status: SHIPPED | OUTPATIENT
Start: 2019-09-16 | End: 2020-02-19

## 2019-09-16 RX ORDER — EPINEPHRINE 0.22MG
100 AEROSOL WITH ADAPTER (ML) INHALATION EVERY MORNING
COMMUNITY

## 2019-09-16 NOTE — TELEPHONE ENCOUNTER
----- Message from Darnell Velazquez sent at 9/16/2019  3:07 PM CDT -----  Contact: fox robles  Patient says that the pharmacy is saying that she can't get her medication, her blood pressure and it was because she hasn't seen dr. Dwyer in a long time. She would like to know what should she do? She says she hasn't taken her meds in 2 days . She says she needs the doctor or nurse to call the pharmacy because she says she isn't sure of what medication that she needs refilled or not, she thinks it may be all of them .   Pt# [phone]  Nabli Farr

## 2019-09-16 NOTE — TELEPHONE ENCOUNTER
----- Message from Kimberly Root sent at 9/16/2019  3:59 PM CDT -----  Contact: Marisol Youssef Meloxicam, Vitamin D3 ,Loperamide for diarrhea # 360,  creon, Incruse ,  Combivent , and  Advair. [facility] [address] # [phone] GH

## 2019-09-16 NOTE — TELEPHONE ENCOUNTER
Spoke with pt - states the pharmacy finally filled the bp meds and fluid pill. Pt will call and let us know if she is almost out of anything.

## 2019-09-17 ENCOUNTER — CLINICAL SUPPORT (OUTPATIENT)
Dept: CARDIOLOGY | Facility: HOSPITAL | Age: 74
DRG: 393 | End: 2019-09-17
Attending: FAMILY MEDICINE
Payer: MEDICARE

## 2019-09-17 VITALS — WEIGHT: 191 LBS | BODY MASS INDEX: 37.5 KG/M2 | HEIGHT: 60 IN

## 2019-09-17 PROBLEM — K85.90 ACUTE PANCREATITIS: Status: ACTIVE | Noted: 2019-09-17

## 2019-09-17 PROBLEM — R07.9 CHEST PAIN: Status: ACTIVE | Noted: 2019-09-17

## 2019-09-17 PROBLEM — Z90.49 HISTORY OF CHOLECYSTECTOMY: Chronic | Status: ACTIVE | Noted: 2019-09-17

## 2019-09-17 PROBLEM — N17.9 ACUTE ON CHRONIC KIDNEY FAILURE: Status: ACTIVE | Noted: 2019-09-17

## 2019-09-17 PROBLEM — N18.9 ACUTE ON CHRONIC KIDNEY FAILURE: Status: ACTIVE | Noted: 2019-09-17

## 2019-09-17 PROBLEM — K85.00 IDIOPATHIC ACUTE PANCREATITIS WITHOUT INFECTION OR NECROSIS: Status: ACTIVE | Noted: 2019-09-17

## 2019-09-17 PROBLEM — I50.9 CHF (CONGESTIVE HEART FAILURE): Chronic | Status: ACTIVE | Noted: 2019-09-17

## 2019-09-17 LAB
ALBUMIN SERPL BCP-MCNC: 4.1 G/DL (ref 3.5–5.2)
ALP SERPL-CCNC: 63 U/L (ref 55–135)
ALT SERPL W/O P-5'-P-CCNC: 22 U/L (ref 10–44)
ANION GAP SERPL CALC-SCNC: 10 MMOL/L (ref 8–16)
ANION GAP SERPL CALC-SCNC: 15 MMOL/L (ref 8–16)
AORTIC ROOT ANNULUS: 3.2 CM
AORTIC VALVE CUSP SEPERATION: 1.76 CM
AST SERPL-CCNC: 28 U/L (ref 10–40)
AV INDEX (PROSTH): 0.67
AV MEAN GRADIENT: 7 MMHG
AV PEAK GRADIENT: 14 MMHG
AV VALVE AREA: 1.58 CM2
AV VELOCITY RATIO: 73.07
BILIRUB SERPL-MCNC: 0.9 MG/DL (ref 0.1–1)
BNP SERPL-MCNC: 103 PG/ML (ref 0–99)
BSA FOR ECHO PROCEDURE: 1.91 M2
BUN SERPL-MCNC: 44 MG/DL (ref 8–23)
BUN SERPL-MCNC: 52 MG/DL (ref 8–23)
C DIFF GDH STL QL: NEGATIVE
C DIFF TOX A+B STL QL IA: NEGATIVE
CALCIUM SERPL-MCNC: 8.6 MG/DL (ref 8.7–10.5)
CALCIUM SERPL-MCNC: 9.4 MG/DL (ref 8.7–10.5)
CHLORIDE SERPL-SCNC: 102 MMOL/L (ref 95–110)
CHLORIDE SERPL-SCNC: 98 MMOL/L (ref 95–110)
CHOLEST SERPL-MCNC: 159 MG/DL (ref 120–199)
CHOLEST/HDLC SERPL: 3.9 {RATIO} (ref 2–5)
CO2 SERPL-SCNC: 26 MMOL/L (ref 23–29)
CO2 SERPL-SCNC: 27 MMOL/L (ref 23–29)
CREAT SERPL-MCNC: 2.4 MG/DL (ref 0.5–1.4)
CREAT SERPL-MCNC: 2.4 MG/DL (ref 0.5–1.4)
CV ECHO LV RWT: 0.66 CM
DOP CALC AO PEAK VEL: 1.89 M/S
DOP CALC AO VTI: 35.68 CM
DOP CALC LVOT AREA: 2.3 CM2
DOP CALC LVOT DIAMETER: 1.73 CM
DOP CALC LVOT PEAK VEL: 138.11 M/S
DOP CALC LVOT STROKE VOLUME: 56.39 CM3
DOP CALCLVOT PEAK VEL VTI: 24 CM
E WAVE DECELERATION TIME: 200.34 MSEC
E/A RATIO: 0.61
E/E' RATIO: 9.57 M/S
ECHO LV POSTERIOR WALL: 1.4 CM (ref 0.6–1.1)
ERYTHROCYTE [DISTWIDTH] IN BLOOD BY AUTOMATED COUNT: 13 % (ref 11.5–14.5)
EST. GFR  (AFRICAN AMERICAN): 22.6 ML/MIN/1.73 M^2
EST. GFR  (AFRICAN AMERICAN): 22.6 ML/MIN/1.73 M^2
EST. GFR  (NON AFRICAN AMERICAN): 19.6 ML/MIN/1.73 M^2
EST. GFR  (NON AFRICAN AMERICAN): 19.6 ML/MIN/1.73 M^2
FRACTIONAL SHORTENING: 30 % (ref 28–44)
GLUCOSE SERPL-MCNC: 135 MG/DL (ref 70–110)
GLUCOSE SERPL-MCNC: 146 MG/DL (ref 70–110)
GLUCOSE SERPL-MCNC: 217 MG/DL (ref 70–110)
GLUCOSE SERPL-MCNC: 84 MG/DL (ref 70–110)
GLUCOSE SERPL-MCNC: 88 MG/DL (ref 70–110)
GLUCOSE SERPL-MCNC: 92 MG/DL (ref 70–110)
HCT VFR BLD AUTO: 39.2 % (ref 37–48.5)
HDLC SERPL-MCNC: 41 MG/DL (ref 40–75)
HDLC SERPL: 25.8 % (ref 20–50)
HGB BLD-MCNC: 12.3 G/DL (ref 12–16)
INTERVENTRICULAR SEPTUM: 2.01 CM (ref 0.6–1.1)
IVRT: 128.46 MSEC
LDLC SERPL CALC-MCNC: 99.8 MG/DL (ref 63–159)
LEFT ATRIUM SIZE: 4.28 CM
LEFT INTERNAL DIMENSION IN SYSTOLE: 2.99 CM (ref 2.1–4)
LEFT VENTRICLE DIASTOLIC VOLUME INDEX: 33.33 ML/M2
LEFT VENTRICLE DIASTOLIC VOLUME: 61 ML
LEFT VENTRICLE MASS INDEX: 170 G/M2
LEFT VENTRICLE SYSTOLIC VOLUME INDEX: 3.6 ML/M2
LEFT VENTRICLE SYSTOLIC VOLUME: 6.6 ML
LEFT VENTRICULAR INTERNAL DIMENSION IN DIASTOLE: 4.25 CM (ref 3.5–6)
LEFT VENTRICULAR MASS: 310.68 G
LIPASE SERPL-CCNC: 44 U/L (ref 4–60)
LIPASE SERPL-CCNC: 76 U/L (ref 4–60)
LV LATERAL E/E' RATIO: 9.57 M/S
LV SEPTAL E/E' RATIO: 9.57 M/S
MAGNESIUM SERPL-MCNC: 1.6 MG/DL (ref 1.6–2.6)
MCH RBC QN AUTO: 28.6 PG (ref 27–31)
MCHC RBC AUTO-ENTMCNC: 31.4 G/DL (ref 32–36)
MCV RBC AUTO: 91 FL (ref 82–98)
MV PEAK A VEL: 1.09 M/S
MV PEAK E VEL: 0.67 M/S
NONHDLC SERPL-MCNC: 118 MG/DL
PHOSPHATE SERPL-MCNC: 3.6 MG/DL (ref 2.7–4.5)
PISA TR MAX VEL: 2.65 M/S
PLATELET # BLD AUTO: 177 K/UL (ref 150–350)
PMV BLD AUTO: 12.2 FL (ref 9.2–12.9)
POTASSIUM SERPL-SCNC: 4.7 MMOL/L (ref 3.5–5.1)
POTASSIUM SERPL-SCNC: 5.1 MMOL/L (ref 3.5–5.1)
PROT SERPL-MCNC: 7.5 G/DL (ref 6–8.4)
PV PEAK VELOCITY: 149.11 CM/S
RBC # BLD AUTO: 4.3 M/UL (ref 4–5.4)
RIGHT VENTRICULAR END-DIASTOLIC DIMENSION: 298 CM
SODIUM SERPL-SCNC: 139 MMOL/L (ref 136–145)
SODIUM SERPL-SCNC: 139 MMOL/L (ref 136–145)
TDI LATERAL: 0.07 M/S
TDI SEPTAL: 0.07 M/S
TDI: 0.07 M/S
TR MAX PG: 28 MMHG
TRIGL SERPL-MCNC: 91 MG/DL (ref 30–150)
TROPONIN I SERPL DL<=0.01 NG/ML-MCNC: <0.03 NG/ML (ref 0.02–0.04)
WBC # BLD AUTO: 11.99 K/UL (ref 3.9–12.7)

## 2019-09-17 PROCEDURE — 63600175 PHARM REV CODE 636 W HCPCS: Performed by: FAMILY MEDICINE

## 2019-09-17 PROCEDURE — 83735 ASSAY OF MAGNESIUM: CPT

## 2019-09-17 PROCEDURE — 80061 LIPID PANEL: CPT

## 2019-09-17 PROCEDURE — 87449 NOS EACH ORGANISM AG IA: CPT

## 2019-09-17 PROCEDURE — 63600175 PHARM REV CODE 636 W HCPCS: Performed by: INTERNAL MEDICINE

## 2019-09-17 PROCEDURE — 25000003 PHARM REV CODE 250: Performed by: INTERNAL MEDICINE

## 2019-09-17 PROCEDURE — 97116 GAIT TRAINING THERAPY: CPT

## 2019-09-17 PROCEDURE — 25000242 PHARM REV CODE 250 ALT 637 W/ HCPCS: Performed by: INTERNAL MEDICINE

## 2019-09-17 PROCEDURE — 12000002 HC ACUTE/MED SURGE SEMI-PRIVATE ROOM

## 2019-09-17 PROCEDURE — 87045 FECES CULTURE AEROBIC BACT: CPT

## 2019-09-17 PROCEDURE — 25000003 PHARM REV CODE 250: Performed by: FAMILY MEDICINE

## 2019-09-17 PROCEDURE — 25500020 PHARM REV CODE 255: Performed by: INTERNAL MEDICINE

## 2019-09-17 PROCEDURE — 94761 N-INVAS EAR/PLS OXIMETRY MLT: CPT

## 2019-09-17 PROCEDURE — 80048 BASIC METABOLIC PNL TOTAL CA: CPT

## 2019-09-17 PROCEDURE — 94640 AIRWAY INHALATION TREATMENT: CPT

## 2019-09-17 PROCEDURE — 85027 COMPLETE CBC AUTOMATED: CPT

## 2019-09-17 PROCEDURE — 97530 THERAPEUTIC ACTIVITIES: CPT

## 2019-09-17 PROCEDURE — 83690 ASSAY OF LIPASE: CPT | Mod: 91

## 2019-09-17 PROCEDURE — 96374 THER/PROPH/DIAG INJ IV PUSH: CPT

## 2019-09-17 PROCEDURE — 63600175 PHARM REV CODE 636 W HCPCS: Performed by: EMERGENCY MEDICINE

## 2019-09-17 PROCEDURE — 87046 STOOL CULTR AEROBIC BACT EA: CPT

## 2019-09-17 PROCEDURE — 25000242 PHARM REV CODE 250 ALT 637 W/ HCPCS: Performed by: FAMILY MEDICINE

## 2019-09-17 PROCEDURE — 36415 COLL VENOUS BLD VENIPUNCTURE: CPT

## 2019-09-17 PROCEDURE — 97162 PT EVAL MOD COMPLEX 30 MIN: CPT

## 2019-09-17 PROCEDURE — 84100 ASSAY OF PHOSPHORUS: CPT

## 2019-09-17 RX ORDER — PANTOPRAZOLE SODIUM 20 MG/1
20 TABLET, DELAYED RELEASE ORAL EVERY MORNING
Status: DISCONTINUED | OUTPATIENT
Start: 2019-09-17 | End: 2019-09-25 | Stop reason: HOSPADM

## 2019-09-17 RX ORDER — MORPHINE SULFATE 2 MG/ML
2 INJECTION, SOLUTION INTRAMUSCULAR; INTRAVENOUS EVERY 4 HOURS PRN
Status: DISCONTINUED | OUTPATIENT
Start: 2019-09-17 | End: 2019-09-18

## 2019-09-17 RX ORDER — ASPIRIN 81 MG/1
81 TABLET ORAL EVERY MORNING
Status: DISCONTINUED | OUTPATIENT
Start: 2019-09-17 | End: 2019-09-25 | Stop reason: HOSPADM

## 2019-09-17 RX ORDER — LOPERAMIDE HYDROCHLORIDE 2 MG/1
2 CAPSULE ORAL 4 TIMES DAILY PRN
Status: DISCONTINUED | OUTPATIENT
Start: 2019-09-17 | End: 2019-09-17

## 2019-09-17 RX ORDER — LANOLIN ALCOHOL/MO/W.PET/CERES
800 CREAM (GRAM) TOPICAL
Status: DISCONTINUED | OUTPATIENT
Start: 2019-09-17 | End: 2019-09-25 | Stop reason: HOSPADM

## 2019-09-17 RX ORDER — SODIUM CHLORIDE 0.9 % (FLUSH) 0.9 %
2 SYRINGE (ML) INJECTION
Status: DISCONTINUED | OUTPATIENT
Start: 2019-09-17 | End: 2019-09-25 | Stop reason: HOSPADM

## 2019-09-17 RX ORDER — PROCHLORPERAZINE EDISYLATE 5 MG/ML
5 INJECTION INTRAMUSCULAR; INTRAVENOUS EVERY 6 HOURS PRN
Status: DISCONTINUED | OUTPATIENT
Start: 2019-09-17 | End: 2019-09-18

## 2019-09-17 RX ORDER — ACETAMINOPHEN 325 MG/1
650 TABLET ORAL EVERY 8 HOURS PRN
Status: DISCONTINUED | OUTPATIENT
Start: 2019-09-17 | End: 2019-09-25 | Stop reason: HOSPADM

## 2019-09-17 RX ORDER — ENOXAPARIN SODIUM 100 MG/ML
40 INJECTION SUBCUTANEOUS EVERY 24 HOURS
Status: DISCONTINUED | OUTPATIENT
Start: 2019-09-17 | End: 2019-09-17

## 2019-09-17 RX ORDER — LOPERAMIDE HYDROCHLORIDE 2 MG/1
2 CAPSULE ORAL 4 TIMES DAILY
Status: DISCONTINUED | OUTPATIENT
Start: 2019-09-17 | End: 2019-09-18

## 2019-09-17 RX ORDER — FLUTICASONE FUROATE AND VILANTEROL 100; 25 UG/1; UG/1
1 POWDER RESPIRATORY (INHALATION) DAILY
Status: DISCONTINUED | OUTPATIENT
Start: 2019-09-17 | End: 2019-09-25 | Stop reason: HOSPADM

## 2019-09-17 RX ORDER — SODIUM,POTASSIUM PHOSPHATES 280-250MG
2 POWDER IN PACKET (EA) ORAL
Status: DISCONTINUED | OUTPATIENT
Start: 2019-09-17 | End: 2019-09-25 | Stop reason: HOSPADM

## 2019-09-17 RX ORDER — SODIUM CHLORIDE 9 MG/ML
1000 INJECTION, SOLUTION INTRAVENOUS
Status: COMPLETED | OUTPATIENT
Start: 2019-09-17 | End: 2019-09-17

## 2019-09-17 RX ORDER — INSULIN ASPART 100 [IU]/ML
1-10 INJECTION, SOLUTION INTRAVENOUS; SUBCUTANEOUS
Status: DISCONTINUED | OUTPATIENT
Start: 2019-09-17 | End: 2019-09-25 | Stop reason: HOSPADM

## 2019-09-17 RX ORDER — POLYETHYLENE GLYCOL 3350 17 G/17G
17 POWDER, FOR SOLUTION ORAL DAILY
Status: DISCONTINUED | OUTPATIENT
Start: 2019-09-17 | End: 2019-09-17

## 2019-09-17 RX ORDER — IBUPROFEN 200 MG
24 TABLET ORAL
Status: DISCONTINUED | OUTPATIENT
Start: 2019-09-17 | End: 2019-09-25 | Stop reason: HOSPADM

## 2019-09-17 RX ORDER — SODIUM CHLORIDE 9 MG/ML
INJECTION, SOLUTION INTRAVENOUS CONTINUOUS
Status: DISCONTINUED | OUTPATIENT
Start: 2019-09-17 | End: 2019-09-17

## 2019-09-17 RX ORDER — POTASSIUM CHLORIDE 20 MEQ/15ML
40 SOLUTION ORAL
Status: DISCONTINUED | OUTPATIENT
Start: 2019-09-17 | End: 2019-09-25 | Stop reason: HOSPADM

## 2019-09-17 RX ORDER — ONDANSETRON 2 MG/ML
4 INJECTION INTRAMUSCULAR; INTRAVENOUS
Status: COMPLETED | OUTPATIENT
Start: 2019-09-17 | End: 2019-09-17

## 2019-09-17 RX ORDER — CHOLESTYRAMINE 4 G/4.8G
1 POWDER, FOR SUSPENSION ORAL 3 TIMES DAILY PRN
Status: DISCONTINUED | OUTPATIENT
Start: 2019-09-17 | End: 2019-09-18

## 2019-09-17 RX ORDER — ENOXAPARIN SODIUM 100 MG/ML
30 INJECTION SUBCUTANEOUS EVERY 24 HOURS
Status: DISCONTINUED | OUTPATIENT
Start: 2019-09-17 | End: 2019-09-19

## 2019-09-17 RX ORDER — ONDANSETRON 2 MG/ML
4 INJECTION INTRAMUSCULAR; INTRAVENOUS EVERY 8 HOURS PRN
Status: DISCONTINUED | OUTPATIENT
Start: 2019-09-17 | End: 2019-09-25 | Stop reason: HOSPADM

## 2019-09-17 RX ORDER — GLUCAGON 1 MG
1 KIT INJECTION
Status: DISCONTINUED | OUTPATIENT
Start: 2019-09-17 | End: 2019-09-25 | Stop reason: HOSPADM

## 2019-09-17 RX ORDER — METOPROLOL SUCCINATE 50 MG/1
50 TABLET, EXTENDED RELEASE ORAL EVERY MORNING
Status: DISCONTINUED | OUTPATIENT
Start: 2019-09-17 | End: 2019-09-25 | Stop reason: HOSPADM

## 2019-09-17 RX ORDER — IBUPROFEN 200 MG
16 TABLET ORAL
Status: DISCONTINUED | OUTPATIENT
Start: 2019-09-17 | End: 2019-09-25 | Stop reason: HOSPADM

## 2019-09-17 RX ORDER — HYDROCODONE BITARTRATE AND ACETAMINOPHEN 5; 325 MG/1; MG/1
1 TABLET ORAL EVERY 6 HOURS PRN
Status: DISCONTINUED | OUTPATIENT
Start: 2019-09-17 | End: 2019-09-25 | Stop reason: HOSPADM

## 2019-09-17 RX ORDER — IPRATROPIUM BROMIDE AND ALBUTEROL SULFATE 2.5; .5 MG/3ML; MG/3ML
3 SOLUTION RESPIRATORY (INHALATION) EVERY 6 HOURS PRN
Status: DISCONTINUED | OUTPATIENT
Start: 2019-09-17 | End: 2019-09-17

## 2019-09-17 RX ORDER — ACETAMINOPHEN 325 MG/1
650 TABLET ORAL EVERY 4 HOURS PRN
Status: DISCONTINUED | OUTPATIENT
Start: 2019-09-17 | End: 2019-09-25 | Stop reason: HOSPADM

## 2019-09-17 RX ORDER — ATORVASTATIN CALCIUM 10 MG/1
10 TABLET, FILM COATED ORAL DAILY
Status: DISCONTINUED | OUTPATIENT
Start: 2019-09-17 | End: 2019-09-25 | Stop reason: HOSPADM

## 2019-09-17 RX ORDER — IPRATROPIUM BROMIDE AND ALBUTEROL SULFATE 2.5; .5 MG/3ML; MG/3ML
3 SOLUTION RESPIRATORY (INHALATION)
Status: DISCONTINUED | OUTPATIENT
Start: 2019-09-17 | End: 2019-09-23

## 2019-09-17 RX ORDER — IPRATROPIUM BROMIDE AND ALBUTEROL SULFATE 2.5; .5 MG/3ML; MG/3ML
3 SOLUTION RESPIRATORY (INHALATION) EVERY 6 HOURS PRN
Status: DISCONTINUED | OUTPATIENT
Start: 2019-09-17 | End: 2019-09-23

## 2019-09-17 RX ADMIN — SODIUM CHLORIDE 1000 ML: 0.9 INJECTION, SOLUTION INTRAVENOUS at 12:09

## 2019-09-17 RX ADMIN — ENOXAPARIN SODIUM 30 MG: 100 INJECTION SUBCUTANEOUS at 06:09

## 2019-09-17 RX ADMIN — LOPERAMIDE HYDROCHLORIDE 2 MG: 2 CAPSULE ORAL at 08:09

## 2019-09-17 RX ADMIN — ATORVASTATIN CALCIUM 10 MG: 10 TABLET, FILM COATED ORAL at 08:09

## 2019-09-17 RX ADMIN — PERFLUTREN 0.2 ML: 6.52 INJECTION, SUSPENSION INTRAVENOUS at 01:09

## 2019-09-17 RX ADMIN — ONDANSETRON 4 MG: 2 INJECTION INTRAMUSCULAR; INTRAVENOUS at 12:09

## 2019-09-17 RX ADMIN — METOPROLOL SUCCINATE 50 MG: 50 TABLET, FILM COATED, EXTENDED RELEASE ORAL at 08:09

## 2019-09-17 RX ADMIN — FLUTICASONE FUROATE AND VILANTEROL TRIFENATATE 1 PUFF: 100; 25 POWDER RESPIRATORY (INHALATION) at 07:09

## 2019-09-17 RX ADMIN — PANTOPRAZOLE SODIUM 20 MG: 20 TABLET, DELAYED RELEASE ORAL at 06:09

## 2019-09-17 RX ADMIN — IPRATROPIUM BROMIDE AND ALBUTEROL SULFATE 3 ML: .5; 3 SOLUTION RESPIRATORY (INHALATION) at 07:09

## 2019-09-17 RX ADMIN — LOPERAMIDE HYDROCHLORIDE 2 MG: 2 CAPSULE ORAL at 03:09

## 2019-09-17 RX ADMIN — SODIUM CHLORIDE: 0.9 INJECTION, SOLUTION INTRAVENOUS at 02:09

## 2019-09-17 RX ADMIN — ASPIRIN 81 MG: 81 TABLET, COATED ORAL at 08:09

## 2019-09-17 RX ADMIN — LOPERAMIDE HYDROCHLORIDE 2 MG: 2 CAPSULE ORAL at 06:09

## 2019-09-17 RX ADMIN — PANCRELIPASE 6 CAPSULE: 30000; 6000; 19000 CAPSULE, DELAYED RELEASE PELLETS ORAL at 06:09

## 2019-09-17 RX ADMIN — IPRATROPIUM BROMIDE AND ALBUTEROL SULFATE 3 ML: .5; 3 SOLUTION RESPIRATORY (INHALATION) at 08:09

## 2019-09-17 RX ADMIN — PANCRELIPASE 6 CAPSULE: 30000; 6000; 19000 CAPSULE, DELAYED RELEASE PELLETS ORAL at 08:09

## 2019-09-17 NOTE — PT/OT/SLP EVAL
Physical Therapy Evaluation    Patient Name:  Marisol Kerr   MRN:  [id number]    Recommendations:     Discharge Recommendations:  home health PT   Discharge Equipment Recommendations: none   Barriers to discharge: None    Assessment:     Marisol Kerr is a 74 y.o. female admitted with a medical diagnosis of Acute pancreatitis.  She presents with the following impairments/functional limitations:  weakness, impaired endurance, gait instability, impaired functional mobilty, decreased safety awareness, impaired balance, impaired self care skills.     Rehab Prognosis: Good; patient would benefit from acute skilled PT services to address these deficits and reach maximum level of function.    Recent Surgery: * No surgery found *      Plan:     During this hospitalization, patient to be seen 5 x/week to address the identified rehab impairments via gait training, therapeutic activities, therapeutic exercises and progress toward the following goals:    · Plan of Care Expires:  10/17/19    Subjective     Chief Complaint: Pt states she is thirsty and now able to have clear liquids during treatment  Patient/Family Comments/goals: home  Pain/Comfort:  · Pain Rating 1: 0/10    Patients cultural, spiritual, Nondenominational conflicts given the current situation:      Living Environment:  Pt states she lives home alone. Single story home with threshold step to enter.   Prior to admission, patients level of function was Mod indep with ambulation using rollator. Slow ambulation. Pt states one fall/ incident where folding table fell on her in garage. She states she cooks, cleans, and performs ADLs.  Equipment used at home: wheelchair, rollator, cane, quad.  DME owned (not currently used): none.  Upon discharge, patient will have assistance from family.    Objective:     Communicated with RN prior to session.  Patient found up in chair with telemetry, peripheral IV, oxygen  upon PT entry to room.    General Precautions: Standard, fall  "  Orthopedic Precautions:    Braces:       Exams:  · Cognitive Exam:  Patient is oriented to Person, Place, Time and Situation  · RLE ROM: WFL  · RLE Strength: WFL  · LLE ROM: WFL  · LLE Strength: WFL    Functional Mobility:  · Bed Mobility:     · Rolling Left:  minimum assistance  · Supine to Sit: moderate assistance  · Transfers:     · Sit to Stand:  contact guard assistance with rolling walker  · Gait: 70' with RW CGA, slow gait, flexed posture; 94% on 3 L with gait training  · Balance: good in sitting; fair in standing      Therapeutic Activities and Exercises:   bed mobility, transfer training, edu on importance of OOB to chair during day; pt reluctant due to "Marbles" on her bottom and states she has special cushion at home but will try to stay up in chair; edu on discharge recommendations for HHPT    AM-PAC 6 CLICK MOBILITY  Total Score:17     Patient left up in chair with all lines intact, call button in reach and RN notified.    GOALS:   Multidisciplinary Problems     Physical Therapy Goals        Problem: Physical Therapy Goal    Goal Priority Disciplines Outcome Goal Variances Interventions   Physical Therapy Goal     PT, PT/OT      Description:  Goals to be met by: discharge     Patient will increase functional independence with mobility by performin. Supine to sit with Stand-by Assistance  2. Sit to stand transfer with Stand-by Assistance  3. Bed to chair transfer with Stand-by Assistance using Rolling Walker  4. Gait  x 125 feet with Stand-by Assistance using Rolling Walker.                       History:     Past Medical History:   Diagnosis Date    CAD (coronary artery disease)     Cancer     colon    CHF (congestive heart failure)     Colon cancer     COPD (chronic obstructive pulmonary disease)     Decreased hearing     Diabetes mellitus     Diabetes mellitus, type 2     Hypercholesterolemia     Hypertension     Hypoxemia     Insomnia     Obesity     Osteoarthritis        Past " Surgical History:   Procedure Laterality Date    CHOLECYSTECTOMY      COLECTOMY      EXTERNAL EAR SURGERY         Time Tracking:     PT Received On: 09/17/19  PT Start Time: 0944     PT Stop Time: 1010  PT Total Time (min): 26 min     Billable Minutes: Evaluation 3, Gait Training 10 and Therapeutic Activity 13      Le Babin, PT  09/17/2019

## 2019-09-17 NOTE — PLAN OF CARE
09/17/19 0758   Patient Assessment/Suction   Level of Consciousness (AVPU) alert   Respiratory Effort Normal;Unlabored   Expansion/Accessory Muscles/Retractions expansion symmetric;no use of accessory muscles   All Lung Fields Breath Sounds clear   RLL Breath Sounds diminished   Rhythm/Pattern, Respiratory pattern regular   PRE-TX-O2   O2 Device (Oxygen Therapy) nasal cannula   Flow (L/min) 4  (decreased to 3L)   SpO2 95 %   Pulse Oximetry Type Intermittent   $ Pulse Oximetry - Multiple Charge Pulse Oximetry - Multiple   Pulse 81   Resp 20   Positioning HOB elevated 45 degrees   Aerosol Therapy   $ Aerosol Therapy Charges Aerosol Treatment   Respiratory Treatment Status (SVN) given   Treatment Route (SVN) mask;oxygen   Patient Position (SVN) HOB elevated   Post Treatment Assessment (SVN) breath sounds improved   Signs of Intolerance (SVN) none   Breath Sounds Post-Respiratory Treatment   Throughout All Fields Post-Treatment All Fields   Throughout All Fields Post-Treatment aeration increased   Post-treatment Heart Rate (beats/min) 80   Post-treatment Resp Rate (breaths/min) 20

## 2019-09-17 NOTE — SUBJECTIVE & OBJECTIVE
Past Medical History:   Diagnosis Date    CAD (coronary artery disease)     Cancer     colon    CHF (congestive heart failure)     Colon cancer     COPD (chronic obstructive pulmonary disease)     Decreased hearing     Diabetes mellitus     Diabetes mellitus, type 2     Hypercholesterolemia     Hypertension     Hypoxemia     Insomnia     Obesity     Osteoarthritis        Past Surgical History:   Procedure Laterality Date    CHOLECYSTECTOMY      COLECTOMY      EXTERNAL EAR SURGERY         Review of patient's allergies indicates:   Allergen Reactions    Iodinated contrast media     Strawberries [strawberry] Hives and Itching       No current facility-administered medications on file prior to encounter.      Current Outpatient Medications on File Prior to Encounter   Medication Sig    acetaminophen (TYLENOL) 500 MG tablet Take 500 mg by mouth every 6 (six) hours as needed for Pain.     albuterol (VENTOLIN HFA) 90 mcg/actuation inhaler Inhale 2 puffs into the lungs every 6 (six) hours as needed for Wheezing or Shortness of Breath. Rescue    aspirin (ECOTRIN) 81 MG EC tablet Take 81 mg by mouth every morning.     coenzyme Q10 100 mg capsule Take 100 mg by mouth every morning.    CREON 36,000-114,000- 180,000 unit CpDR Take 1 capsule by mouth 4 (four) times daily.    dextran 70-hypromellose (ARTIFICIAL TEARS,[id number],) 0.1-0.3 % Drop Place 1 drop into both eyes daily as needed.    ergocalciferol (VITAMIN D2) 50,000 unit Cap Take 1 capsule (50,000 Units total) by mouth every 7 days. (Patient taking differently: Take 50,000 Units by mouth every 7 days. ON SUNDAYS)    fish oil-omega-3 fatty acids 300-1,000 mg capsule Take 1 capsule by mouth every morning.    FLAXSEED OIL ORAL Take 1,200 mg by mouth every morning.    fluticasone-salmeterol diskus inhaler 250-50 mcg Inhale 1 puff into the lungs 2 (two) times daily.    furosemide (LASIX) 20 MG tablet Take 20 mg by mouth every morning.      ipratropium-albuterol (COMBIVENT RESPIMAT)  mcg/actuation inhaler Inhale 2 puffs into the lungs every 4 (four) hours as needed. Rescue (Patient taking differently: Inhale 2 puffs into the lungs every 4 (four) hours as needed for Shortness of Breath. Rescue)    loperamide (IMODIUM) 2 mg capsule Take 1 capsule (2 mg total) by mouth 4 (four) times daily.    lovastatin (MEVACOR) 20 MG tablet Take 20 mg by mouth every evening.     meloxicam (MOBIC) 7.5 MG tablet Take 1 tablet (7.5 mg total) by mouth once daily. (Patient taking differently: Take 7.5 mg by mouth every morning. )    metFORMIN (GLUCOPHAGE) 500 MG tablet Take 500 mg by mouth daily with breakfast.    metoprolol succinate (TOPROL-XL) 50 MG 24 hr tablet Take 50 mg by mouth every morning.    nitroGLYCERIN 0.2 mg/hr TD PT24 (NITRODUR) 0.2 mg/hr Place 1 patch onto the skin every morning.    nitroGLYCERIN 0.4 MG/DOSE TL SPRY (NITROLINGUAL) 400 mcg/spray spray Place 1 spray under the tongue every 5 (five) minutes as needed for Chest pain (DO NOT EXCEED A TOTAL OF 3 SPRAYS IN 15 MINUTES).    ondansetron (ZOFRAN-ODT) 4 MG TbDL Take 4 mg by mouth every 6 (six) hours as needed.    pantoprazole (PROTONIX) 20 MG tablet Take 1 tablet (20 mg total) by mouth once daily. (Patient taking differently: Take 20 mg by mouth every morning. )    PNV NO.115/IRON FUMARATE/FA (PRENATAL #115-IRON-FOLIC ACID ORAL) Take 1 tablet by mouth every morning.     umeclidinium (INCRUSE ELLIPTA) 62.5 mcg/actuation DsDv Inhale 62.5 mcg into the lungs every morning. Controller    vit C/E/Zn/coppr/lutein/zeaxan (PRESERVISION AREDS-2 ORAL) Take 1 capsule by mouth every morning.    vitamin D (VITAMIN D3) 1000 units Tab Take 1,000 Units by mouth every morning.    albuterol (PROVENTIL) 2.5 mg /3 mL (0.083 %) nebulizer solution USE ONE VIAL IN NEBULIZER EVERY 6 HOURS AS NEEDED FOR WHEEZING (Patient taking differently: Take 2.5 mg by nebulization every 6 (six) hours as needed for  Wheezing. USE ONE VIAL IN NEBULIZER EVERY 6 HOURS AS NEEDED FOR WHEEZING)    benazepril (LOTENSIN) 40 MG tablet Take 1 tablet (40 mg total) by mouth once daily. (Patient taking differently: Take 40 mg by mouth every morning. )    fluticasone (FLONASE) 50 mcg/actuation nasal spray 2 sprays (100 mcg total) by Each Nare route once daily. (Patient taking differently: 2 sprays by Each Nostril route daily as needed for Allergies. )     Family History     Problem Relation (Age of Onset)    Febrile seizures Mother    Heart failure Father        Tobacco Use    Smoking status: Former Smoker     Packs/day: 3.00     Years: 50.00     Pack years: 150.00     Types: Cigarettes     Start date: 3/30/1964     Last attempt to quit: 2006     Years since quittin.5    Smokeless tobacco: Never Used   Substance and Sexual Activity    Alcohol use: Yes    Drug use: No    Sexual activity: Never     Review of Systems   Constitutional: Negative for chills, fatigue, fever and unexpected weight change.   HENT: Negative for ear pain, rhinorrhea, sneezing and sore throat.    Eyes: Negative for visual disturbance.   Respiratory: Negative for cough, chest tightness and shortness of breath.    Cardiovascular: Negative for chest pain.   Gastrointestinal: Negative for abdominal pain, constipation, diarrhea, nausea and vomiting.   Endocrine: Negative for polyuria.   Genitourinary: Negative for dysuria and hematuria.   Neurological: Negative for seizures and headaches.     Objective:     Vital Signs (Most Recent):  Temp: 98.8 °F (37.1 °C) (19)  Pulse: 93 (19)  Resp: (!) 22 (19)  BP: (!) 136/91 (19)  SpO2: 97 % (19) Vital Signs (24h Range):  Temp:  [98.8 °F (37.1 °C)] 98.8 °F (37.1 °C)  Pulse:  [] 93  Resp:  [16-25] 22  SpO2:  [93 %-99 %] 97 %  BP: (101-136)/(46-91) 136/91     Weight: 86.2 kg (190 lb)  Body mass index is 37.11 kg/m².    Physical Exam   Constitutional: She is  oriented to person, place, and time. She appears well-developed and well-nourished. No distress.   HENT:   Head: Normocephalic and atraumatic.   Right Ear: External ear normal.   Left Ear: External ear normal.   Nose: Nose normal.   Mouth/Throat: Oropharynx is clear and moist. No oropharyngeal exudate.   Hard of hearing   Eyes: Pupils are equal, round, and reactive to light. Conjunctivae and EOM are normal. Right eye exhibits no discharge. Left eye exhibits no discharge. No scleral icterus.   Neck: Normal range of motion. Neck supple. No thyromegaly present.   Cardiovascular: Normal rate, regular rhythm, normal heart sounds and intact distal pulses. Exam reveals no gallop and no friction rub.   No murmur heard.  Pulmonary/Chest: Effort normal and breath sounds normal. No respiratory distress. She has no wheezes. She has no rales. She exhibits no tenderness.   Abdominal: Soft. Bowel sounds are normal. She exhibits no distension and no mass. There is tenderness (Epigastric tenderness). There is no rebound and no guarding. No hernia.   Obese   Musculoskeletal: Normal range of motion. She exhibits edema (Trace pitting edema BL LE). She exhibits no tenderness.   Lymphadenopathy:     She has no cervical adenopathy.   Neurological: She is alert and oriented to person, place, and time.   Skin: Skin is warm. She is not diaphoretic.   Psychiatric: She has a normal mood and affect. Her behavior is normal. Judgment and thought content normal.   Nursing note and vitals reviewed.        CRANIAL NERVES     CN III, IV, VI   Pupils are equal, round, and reactive to light.  Extraocular motions are normal.        Significant Labs:   CBC:   Recent Labs   Lab 09/16/19  2250   WBC 16.60*   HGB 14.4   HCT 46.3        CMP:   Recent Labs   Lab 09/16/19  2250      K 5.1   CL 98   CO2 26   *   BUN 44*   CREATININE 2.4*   CALCIUM 9.4   PROT 7.5   ALBUMIN 4.1   BILITOT 0.9   ALKPHOS 63   AST 28   ALT 22   ANIONGAP 15    EGFRNONAA 19.6*     Lipase:   Recent Labs   Lab 09/16/19  2250   LIPASE 201*     Troponin:   Recent Labs   Lab 09/16/19 2250   TROPONINI <0.030     Significant Imaging:   CT ab pelvis wo  IMPRESSION:  1. No definite acute findings in the abdomen or the pelvis.  2. Additional observations as above.

## 2019-09-17 NOTE — PROGRESS NOTES
Pharmacist Renal Dose Adjustment Note    Marisol Kerr is a 74 y.o. female being treated with the medication enoxaparin    Patient Data:    Vital Signs (Most Recent):  Temp: 98.8 °F (37.1 °C) (09/16/19 2221)  Pulse: 93 (09/17/19 0221)  Resp: (!) 22 (09/17/19 0212)  BP: (!) 136/91 (09/17/19 0221)  SpO2: 97 % (09/17/19 0221)   Vital Signs (72h Range):  Temp:  [98.8 °F (37.1 °C)]   Pulse:  []   Resp:  [16-25]   BP: (101-136)/(46-91)   SpO2:  [93 %-99 %]      Recent Labs   Lab 09/16/19 2250   CREATININE 2.4*     Serum creatinine: 2.4 mg/dL (H) 09/16/19 2250  Estimated creatinine clearance: 20.7 mL/min (A)    Medication:enoxaparin dose: 40mg frequency q24h will be changed to medication:enoxaparin dose:30mg frequency:q24h per CrCl < 30 (20.7 ml/min)    Pharmacist's Name: Augie Wilder  Pharmacist's Extension: [phone]

## 2019-09-17 NOTE — HPI
74-year-old female history of COPD on 3 L oxygen or home, hypertension, hyperlipidemia, IDDM, S/P cholecystectomy, history colon cancer previously on chemotherapy who lives at home alone comes in for abdominal pain, nausea and vomiting, and diarrhea. Patient reports that since January after she had her last colonoscopy she is having chronic diarrhea that she improves with Imodium. Today she was in usual health about 8 PM the day before admission when she suddenly developed nausea with nonbloody nonbilious emesis while she was in the phone with a friend. Reports having 2 episodes of emesis. Patient then attempted to walk to the bathroom but had an episode of diarrhea before she reached the bathroom. [place] then [place] and had another several episodes of nausea and vomiting associated with epigastric/chest pain. Describes chest pain as sharp radiating to her back type of pain. Denies any shortness of breath, diaphoresis. Patient's friend became concerned and brought patient to the ED for further evaluation. Reports drinking alcohol on holidays and has not had a drink in several months. In the ED, patient was found to have a lipase of 200. Initial troponin was negative and EKG was unremarkable.

## 2019-09-17 NOTE — ASSESSMENT & PLAN NOTE
Base line Cr 1.5  Pain control, NPO, IVF  Lipid panel  S/p cholecystectomy and without abnormal liver enzymes. Lower suspicion for choledocholithiasis  Lipase daily  troponin x1. Suspect chest pain more related to pancreatitis  cw home meds  Echo  ISS  holding home nephrotoxic medications

## 2019-09-17 NOTE — H&P
"[facility] Medicine  History & Physical    Patient Name: Marisol Kerr  MRN: [id number]  Admission Date: 9/16/2019  Attending Physician: Khadar Berry MD   Primary Care Provider: Khadar Dwyer MD         Patient information was obtained from patient and ER records.     Subjective:     Principal Problem:Acute pancreatitis    Chief Complaint:   Chief Complaint   Patient presents with    Chest Pain     "vomiting / diarrhea / short of breath" home O2 at 3 l/min         HPI: 74-year-old female history of COPD on 3 L oxygen or home, hypertension, hyperlipidemia, IDDM, S/P cholecystectomy, history colon cancer previously on chemotherapy who lives at home alone comes in for abdominal pain, nausea and vomiting, and diarrhea. Patient reports that since January after she had her last colonoscopy she is having chronic diarrhea that she improves with Imodium. Today she was in usual health about 8 PM the day before admission when she suddenly developed nausea with nonbloody nonbilious emesis while she was in the phone with a friend. Reports having 2 episodes of emesis. Patient then attempted to walk to the bathroom but had an episode of diarrhea before she reached the bathroom. [place] then [place] and had another several episodes of nausea and vomiting associated with epigastric/chest pain. Describes chest pain as sharp radiating to her back type of pain. Denies any shortness of breath, diaphoresis. Patient's friend became concerned and brought patient to the ED for further evaluation. Reports drinking alcohol on holidays and has not had a drink in several months.    In the ED, patient was found to have a lipase of 200. Initial troponin was negative and EKG was unremarkable.    Past Medical History:   Diagnosis Date    CAD (coronary artery disease)     Cancer     colon    CHF (congestive heart failure)     Colon cancer     COPD (chronic obstructive pulmonary disease)     Decreased " hearing     Diabetes mellitus     Diabetes mellitus, type 2     Hypercholesterolemia     Hypertension     Hypoxemia     Insomnia     Obesity     Osteoarthritis        Past Surgical History:   Procedure Laterality Date    CHOLECYSTECTOMY      COLECTOMY      EXTERNAL EAR SURGERY         Review of patient's allergies indicates:   Allergen Reactions    Iodinated contrast media     Strawberries [strawberry] Hives and Itching       No current facility-administered medications on file prior to encounter.      Current Outpatient Medications on File Prior to Encounter   Medication Sig    acetaminophen (TYLENOL) 500 MG tablet Take 500 mg by mouth every 6 (six) hours as needed for Pain.     albuterol (VENTOLIN HFA) 90 mcg/actuation inhaler Inhale 2 puffs into the lungs every 6 (six) hours as needed for Wheezing or Shortness of Breath. Rescue    aspirin (ECOTRIN) 81 MG EC tablet Take 81 mg by mouth every morning.     coenzyme Q10 100 mg capsule Take 100 mg by mouth every morning.    CREON 36,000-114,000- 180,000 unit CpDR Take 1 capsule by mouth 4 (four) times daily.    dextran 70-hypromellose (ARTIFICIAL TEARS,[id number],) 0.1-0.3 % Drop Place 1 drop into both eyes daily as needed.    ergocalciferol (VITAMIN D2) 50,000 unit Cap Take 1 capsule (50,000 Units total) by mouth every 7 days. (Patient taking differently: Take 50,000 Units by mouth every 7 days. ON SUNDAYS)    fish oil-omega-3 fatty acids 300-1,000 mg capsule Take 1 capsule by mouth every morning.    FLAXSEED OIL ORAL Take 1,200 mg by mouth every morning.    fluticasone-salmeterol diskus inhaler 250-50 mcg Inhale 1 puff into the lungs 2 (two) times daily.    furosemide (LASIX) 20 MG tablet Take 20 mg by mouth every morning.     ipratropium-albuterol (COMBIVENT RESPIMAT)  mcg/actuation inhaler Inhale 2 puffs into the lungs every 4 (four) hours as needed. Rescue (Patient taking differently: Inhale 2 puffs into the lungs every 4 (four)  hours as needed for Shortness of Breath. Rescue)    loperamide (IMODIUM) 2 mg capsule Take 1 capsule (2 mg total) by mouth 4 (four) times daily.    lovastatin (MEVACOR) 20 MG tablet Take 20 mg by mouth every evening.     meloxicam (MOBIC) 7.5 MG tablet Take 1 tablet (7.5 mg total) by mouth once daily. (Patient taking differently: Take 7.5 mg by mouth every morning. )    metFORMIN (GLUCOPHAGE) 500 MG tablet Take 500 mg by mouth daily with breakfast.    metoprolol succinate (TOPROL-XL) 50 MG 24 hr tablet Take 50 mg by mouth every morning.    nitroGLYCERIN 0.2 mg/hr TD PT24 (NITRODUR) 0.2 mg/hr Place 1 patch onto the skin every morning.    nitroGLYCERIN 0.4 MG/DOSE TL SPRY (NITROLINGUAL) 400 mcg/spray spray Place 1 spray under the tongue every 5 (five) minutes as needed for Chest pain (DO NOT EXCEED A TOTAL OF 3 SPRAYS IN 15 MINUTES).    ondansetron (ZOFRAN-ODT) 4 MG TbDL Take 4 mg by mouth every 6 (six) hours as needed.    pantoprazole (PROTONIX) 20 MG tablet Take 1 tablet (20 mg total) by mouth once daily. (Patient taking differently: Take 20 mg by mouth every morning. )    PNV NO.115/IRON FUMARATE/FA (PRENATAL #115-IRON-FOLIC ACID ORAL) Take 1 tablet by mouth every morning.     umeclidinium (INCRUSE ELLIPTA) 62.5 mcg/actuation DsDv Inhale 62.5 mcg into the lungs every morning. Controller    vit C/E/Zn/coppr/lutein/zeaxan (PRESERVISION AREDS-2 ORAL) Take 1 capsule by mouth every morning.    vitamin D (VITAMIN D3) 1000 units Tab Take 1,000 Units by mouth every morning.    albuterol (PROVENTIL) 2.5 mg /3 mL (0.083 %) nebulizer solution USE ONE VIAL IN NEBULIZER EVERY 6 HOURS AS NEEDED FOR WHEEZING (Patient taking differently: Take 2.5 mg by nebulization every 6 (six) hours as needed for Wheezing. USE ONE VIAL IN NEBULIZER EVERY 6 HOURS AS NEEDED FOR WHEEZING)    benazepril (LOTENSIN) 40 MG tablet Take 1 tablet (40 mg total) by mouth once daily. (Patient taking differently: Take 40 mg by mouth every  morning. )    fluticasone (FLONASE) 50 mcg/actuation nasal spray 2 sprays (100 mcg total) by Each Nare route once daily. (Patient taking differently: 2 sprays by Each Nostril route daily as needed for Allergies. )     Family History     Problem Relation (Age of Onset)    Febrile seizures Mother    Heart failure Father        Tobacco Use    Smoking status: Former Smoker     Packs/day: 3.00     Years: 50.00     Pack years: 150.00     Types: Cigarettes     Start date: 3/30/1964     Last attempt to quit: 2006     Years since quittin.5    Smokeless tobacco: Never Used   Substance and Sexual Activity    Alcohol use: Yes    Drug use: No    Sexual activity: Never     Review of Systems   Constitutional: Negative for chills, fatigue, fever and unexpected weight change.   HENT: Negative for ear pain, rhinorrhea, sneezing and sore throat.    Eyes: Negative for visual disturbance.   Respiratory: Negative for cough, chest tightness and shortness of breath.    Cardiovascular: Negative for chest pain.   Gastrointestinal: Negative for abdominal pain, constipation, diarrhea, nausea and vomiting.   Endocrine: Negative for polyuria.   Genitourinary: Negative for dysuria and hematuria.   Neurological: Negative for seizures and headaches.     Objective:     Vital Signs (Most Recent):  Temp: 98.8 °F (37.1 °C) (19)  Pulse: 93 (19)  Resp: (!) 22 (19)  BP: (!) 136/91 (19)  SpO2: 97 % (19) Vital Signs (24h Range):  Temp:  [98.8 °F (37.1 °C)] 98.8 °F (37.1 °C)  Pulse:  [] 93  Resp:  [16-25] 22  SpO2:  [93 %-99 %] 97 %  BP: (101-136)/(46-91) 136/91     Weight: 86.2 kg (190 lb)  Body mass index is 37.11 kg/m².    Physical Exam   Constitutional: She is oriented to person, place, and time. She appears well-developed and well-nourished. No distress.   HENT:   Head: Normocephalic and atraumatic.   Right Ear: External ear normal.   Left Ear: External ear normal.   Nose: Nose  normal.   Mouth/Throat: Oropharynx is clear and moist. No oropharyngeal exudate.   Hard of hearing   Eyes: Pupils are equal, round, and reactive to light. Conjunctivae and EOM are normal. Right eye exhibits no discharge. Left eye exhibits no discharge. No scleral icterus.   Neck: Normal range of motion. Neck supple. No thyromegaly present.   Cardiovascular: Normal rate, regular rhythm, normal heart sounds and intact distal pulses. Exam reveals no gallop and no friction rub.   No murmur heard.  Pulmonary/Chest: Effort normal and breath sounds normal. No respiratory distress. She has no wheezes. She has no rales. She exhibits no tenderness.   Abdominal: Soft. Bowel sounds are normal. She exhibits no distension and no mass. There is tenderness (Epigastric tenderness). There is no rebound and no guarding. No hernia.   Obese   Musculoskeletal: Normal range of motion. She exhibits edema (Trace pitting edema BL LE). She exhibits no tenderness.   Lymphadenopathy:     She has no cervical adenopathy.   Neurological: She is alert and oriented to person, place, and time.   Skin: Skin is warm. She is not diaphoretic.   Psychiatric: She has a normal mood and affect. Her behavior is normal. Judgment and thought content normal.   Nursing note and vitals reviewed.        CRANIAL NERVES     CN III, IV, VI   Pupils are equal, round, and reactive to light.  Extraocular motions are normal.        Significant Labs:   CBC:   Recent Labs   Lab 09/16/19 2250   WBC 16.60*   HGB 14.4   HCT 46.3        CMP:   Recent Labs   Lab 09/16/19 2250      K 5.1   CL 98   CO2 26   *   BUN 44*   CREATININE 2.4*   CALCIUM 9.4   PROT 7.5   ALBUMIN 4.1   BILITOT 0.9   ALKPHOS 63   AST 28   ALT 22   ANIONGAP 15   EGFRNONAA 19.6*     Lipase:   Recent Labs   Lab 09/16/19 2250   LIPASE 201*     Troponin:   Recent Labs   Lab 09/16/19 2250   TROPONINI <0.030     Significant Imaging:   CT ab pelvis wo  IMPRESSION:  1. No definite acute  findings in the abdomen or the pelvis.  2. Additional observations as above.      Assessment/Plan:     Active Hospital Problems    Diagnosis  POA    *Acute pancreatitis [K85.90]  Yes     Priority: 1 - High    Acute on chronic kidney failure [N17.9, N18.9]  Yes     Priority: 2     Chest pain [R07.9]  Yes     Priority: 3     CHF (congestive heart failure) [I50.9]  Yes     Chronic    Idiopathic acute pancreatitis without infection or necrosis [K85.00]  Yes    History of cholecystectomy [Z90.49]  Not Applicable     Chronic    COPD (chronic obstructive pulmonary disease) [J44.9]  Yes    Chronic hypoxemic respiratory failure [J96.11]  Yes     3 L at home      History of colon cancer [Z85.038]  Yes    Severe obesity (BMI 35.0-35.9 with comorbidity) [E66.01, Z68.35]  Not Applicable    CKD (chronic kidney disease) [N18.9]  Yes    Diabetes mellitus type 2, controlled [E11.9]  Yes    Essential hypertension, benign [I10]  Yes    Coronary atherosclerosis [I25.10]  Yes      Resolved Hospital Problems   No resolved problems to display.       * Acute pancreatitis  Base line Cr 1.5  Pain control, NPO, IVF  Lipid panel  S/p cholecystectomy and without abnormal liver enzymes. Lower suspicion for choledocholithiasis  Lipase daily  troponin x1. Suspect chest pain more related to pancreatitis  cw home meds  Echo  ISS  holding home nephrotoxic medications    VTE Risk Mitigation (From admission, onward)        Ordered     enoxaparin injection 40 mg  Daily      09/17/19 0237     IP VTE HIGH RISK PATIENT  Once      09/17/19 0237             Khadar Berry MD  Department of Hospital Medicine   [facility]  Date of service: 09/17/2019 2:49 AM

## 2019-09-17 NOTE — PLAN OF CARE
Problem: Adult Inpatient Plan of Care  Goal: Plan of Care Review  Outcome: Ongoing (interventions implemented as appropriate)  Ongoing     Comments: Ongoing

## 2019-09-17 NOTE — PLAN OF CARE
Problem: Diabetes Comorbidity  Goal: Blood Glucose Level Within Desired Range    Intervention: Maintain Glycemic Control  Ongoing       Comments: Ongoing

## 2019-09-17 NOTE — ED PROVIDER NOTES
"Encounter Date: 2019       History     Chief Complaint   Patient presents with    Chest Pain     "vomiting / diarrhea / short of breath" home O2 at 3 l/min      Patient here with reported chest pain with associated vomiting, nausea shortness of breath onset this morning the patient reports chronic diarrhea since colonoscopy in January the patient does have a history of colon cancer with resection she has had a cholecystectomy as well she denies any fever reports chronic chills chest pain was midsternal and dull pressure-like onset during the vomiting patient has a history of COPD is on oxygen at home states shortness of breath has been worse with dyspnea on exertion        Review of patient's allergies indicates:   Allergen Reactions    Iodinated contrast media     Strawberries [strawberry] Hives and Itching     Past Medical History:   Diagnosis Date    CAD (coronary artery disease)     Cancer     colon    CHF (congestive heart failure)     Colon cancer     COPD (chronic obstructive pulmonary disease)     Decreased hearing     Diabetes mellitus     Diabetes mellitus, type 2     Hypercholesterolemia     Hypertension     Hypoxemia     Insomnia     Obesity     Osteoarthritis      Past Surgical History:   Procedure Laterality Date    CHOLECYSTECTOMY      COLECTOMY      EXTERNAL EAR SURGERY       Family History   Problem Relation Age of Onset    Febrile seizures Mother     Heart failure Father      Social History     Tobacco Use    Smoking status: Former Smoker     Packs/day: 3.00     Years: 50.00     Pack years: 150.00     Types: Cigarettes     Start date: 3/30/1964     Last attempt to quit: 2006     Years since quittin.5    Smokeless tobacco: Never Used   Substance Use Topics    Alcohol use: Yes    Drug use: No     Review of Systems   Constitutional: Negative for chills and fever.   HENT: Negative.    Eyes: Negative.    Respiratory: Positive for cough and shortness of breath.  "   Cardiovascular: Positive for chest pain. Negative for palpitations and leg swelling.   Gastrointestinal: Positive for diarrhea, nausea and vomiting.   Endocrine: Negative.    Genitourinary: Negative.    Musculoskeletal: Negative.    Skin: Negative.    Allergic/Immunologic: Negative.    Neurological: Positive for light-headedness.   Hematological: Negative.    Psychiatric/Behavioral: Negative.        Physical Exam     Initial Vitals [09/16/19 2221]   BP Pulse Resp Temp SpO2   (!) 131/52 100 (!) 22 98.8 °F (37.1 °C) (!) 94 %      MAP       --         Physical Exam    Constitutional: She appears well-developed and well-nourished. No distress.   HENT:   Head: Normocephalic and atraumatic.   Right Ear: External ear normal.   Left Ear: External ear normal.   Mucosa dry sticky   Eyes: Conjunctivae and EOM are normal. Pupils are equal, round, and reactive to light.   Neck: Normal range of motion. Neck supple.   Cardiovascular: Normal rate, regular rhythm, normal heart sounds and intact distal pulses.   Pulmonary/Chest: Breath sounds normal.   Abdominal: Soft. There is tenderness.   Decreased bowel sounds   Musculoskeletal: Normal range of motion.   Neurological: She is alert and oriented to person, place, and time. GCS score is 15. GCS eye subscore is 4. GCS verbal subscore is 5. GCS motor subscore is 6.   Skin: Skin is warm and dry. Capillary refill takes less than 2 seconds.   Psychiatric: She has a normal mood and affect. Her behavior is normal.         ED Course   Procedures  Labs Reviewed   CBC W/ AUTO DIFFERENTIAL - Abnormal; Notable for the following components:       Result Value    WBC 16.60 (*)     Mean Corpuscular Hemoglobin Conc 31.1 (*)     Gran # (ANC) 15.6 (*)     Immature Grans (Abs) 0.05 (*)     Lymph # 0.4 (*)     Gran% 93.7 (*)     Lymph% 2.3 (*)     Mono% 3.4 (*)     All other components within normal limits   COMPREHENSIVE METABOLIC PANEL - Abnormal; Notable for the following components:    Glucose  217 (*)     BUN, Bld 44 (*)     Creatinine 2.4 (*)     eGFR if  22.6 (*)     eGFR if non  19.6 (*)     All other components within normal limits   B-TYPE NATRIURETIC PEPTIDE - Abnormal; Notable for the following components:     (*)     All other components within normal limits   LIPASE - Abnormal; Notable for the following components:    Lipase 201 (*)     All other components within normal limits   TROPONIN I   PROTIME-INR          Imaging Results          CT Abdomen Pelvis  Without Contrast (In process)                X-Ray Chest AP Portable (In process)                                       Clinical Impression:       ICD-10-CM ICD-9-CM   1. Idiopathic acute pancreatitis without infection or necrosis K85.00 577.0   2. Shortness of breath R06.02 786.05   3. Chest pain, unspecified type R07.9 786.50                                Preston Vaughn MD  09/17/19 0218

## 2019-09-17 NOTE — ED TRIAGE NOTES
"Marisol Kerr, a 74 y.o. female presents to the ED w/ complaint of midsternum chest pain pain non-radiating pain scale of 6/10 today. Pt also states that she has been vomiting "a lot" today. Has been having diarrhea since her colonoscopy approximately a year ago. Baseline on home O2 at 3L/min. Worsening SOB w/ excerption. Generalized abdominal pain throughout. AAOx4.    Triage note:  Chief Complaint   Patient presents with    Chest Pain     "vomiting / diarrhea / short of breath" home O2 at 3 l/min      Review of patient's allergies indicates:   Allergen Reactions    Iodinated contrast media     Strawberries [strawberry] Hives and Itching     Past Medical History:   Diagnosis Date    CAD (coronary artery disease)     Cancer     colon    CHF (congestive heart failure)     Colon cancer     COPD (chronic obstructive pulmonary disease)     Decreased hearing     Diabetes mellitus     Diabetes mellitus, type 2     Hypercholesterolemia     Hypertension     Hypoxemia     Insomnia     Obesity     Osteoarthritis      Adult Physical Assessment  LOC: Marisol Kerr, 74 y.o. female verified via two identifiers.  The patient is awake, alert, oriented and speaking appropriately at this time.  APPEARANCE: Patient resting comfortably and appears to be in no acute distress at this time. Patient is clean and well groomed, patient's clothing is properly fastened.  SKIN:The skin is warm and dry, color consistent with ethnicity.  MUSCULOSKELETAL: Patient moving all extremities well, no obvious swelling or deformities noted.  RESPIRATORY: Diminished breath sounds throughout; tachypneic; Home O2 3L via nasal cannula.  CARDIAC: Patient has a normal rate and rhythm, no periphreal edema noted in any extremity. Midsternum chest pain.  ABDOMEN: Soft and tender to palpation, no abdominal distention noted. Generalized abdominal pain.  NEUROLOGIC: Eyes open spontaneously, behavior appropriate to situation, follows commands, facial " expression symmetrical, bilateral hand grasp equal and even, purposeful motor response noted, normal sensation in all extremities when touched with a finger.  GI: N/VD

## 2019-09-17 NOTE — NURSING TRANSFER
Nursing Transfer Note      9/17/2019     Transfer To: 1000 UNIT    Transfer via wheelchair    Transfer with O2    Transported by Vanna ESPINOZA,RN    Medicines sent: YES    Chart send with patient: Yes    Notified: daughter    Patient reassessed at: 09/17/2019 @ 1650     Upon arrival to floor: patient oriented to room, call bell in reach and bed in lowest position

## 2019-09-17 NOTE — PROGRESS NOTES
Patient admitted earlier this morning by overnight team.    Presenting with acute on chronic nausea associated with multiple episodes of nonbloody emesis.  Also has chronic diarrhea at baseline, reported worsening.  She is a poor historian.  She reports yesterday she was talking on the phone, sudden onset of nausea with multiple episodes of emesis, quantified 10 episodes.  On admission CT abdomen and pelvis done, no acute, lipase elevated.  Admitted diagnosis with acute pancreatitis.  Today she is tolerating clear liquid diet without recurrence of nausea or emesis.  Still continues to have diarrhea.  Seen by physical therapy with recommendations for home health which patient is agreeable to, has had [facility] in the past.  Seen by Wound Care, dry lower extremity, stage I sacral decubitus ulcer, present on arrival.    Labs reviewed    Acute pancreatitis, clinically improving, lipase now normalized, advance diet to soft bland, monitor clinically, discontinue IV fluids  Chronic diarrhea, on Imodium 4 times a day, will reorder, history of colon cancer as well as cholecystectomy, add p.r.nMercedes Rosario.  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

## 2019-09-17 NOTE — ED NOTES
Bed: 06  Expected date:   Expected time:   Means of arrival:   Comments:   From: Luigi Bruce  To: Lisa Garcia DO  Sent: 12/22/2018 3:50 PM CST  Subject: Lab Test or Test Related Question    Hi, Were my ultrasounds read? Im having significant pain in my left lateral breast at the edge of axillary area. It feels like a lump Im not sure if it is a lymph node, or breast tissue. It is very painful.    Thank you,  Adriana Hinojosa

## 2019-09-18 PROBLEM — D72.829 LEUKOCYTOSIS: Status: RESOLVED | Noted: 2019-09-18 | Resolved: 2019-09-18

## 2019-09-18 PROBLEM — N17.9 AKI (ACUTE KIDNEY INJURY): Status: RESOLVED | Noted: 2019-09-18 | Resolved: 2019-09-18

## 2019-09-18 PROBLEM — N17.9 AKI (ACUTE KIDNEY INJURY): Status: ACTIVE | Noted: 2019-09-18

## 2019-09-18 PROBLEM — D72.829 LEUKOCYTOSIS: Status: ACTIVE | Noted: 2019-09-18

## 2019-09-18 PROBLEM — K43.9 VENTRAL HERNIA: Chronic | Status: ACTIVE | Noted: 2019-09-18

## 2019-09-18 PROBLEM — K85.00 IDIOPATHIC ACUTE PANCREATITIS WITHOUT INFECTION OR NECROSIS: Status: RESOLVED | Noted: 2019-09-17 | Resolved: 2019-09-18

## 2019-09-18 PROBLEM — N20.0 NEPHROLITHIASIS: Chronic | Status: ACTIVE | Noted: 2019-09-18

## 2019-09-18 PROBLEM — L89.151 DECUBITUS ULCER OF SACRAL REGION, STAGE 1: Status: ACTIVE | Noted: 2019-09-18

## 2019-09-18 PROBLEM — R19.7 DIARRHEA: Status: ACTIVE | Noted: 2019-09-18

## 2019-09-18 LAB
ANION GAP SERPL CALC-SCNC: 7 MMOL/L (ref 8–16)
BUN SERPL-MCNC: 49 MG/DL (ref 8–23)
CALCIUM SERPL-MCNC: 8.4 MG/DL (ref 8.7–10.5)
CHLORIDE SERPL-SCNC: 106 MMOL/L (ref 95–110)
CO2 SERPL-SCNC: 27 MMOL/L (ref 23–29)
CREAT SERPL-MCNC: 1.8 MG/DL (ref 0.5–1.4)
ERYTHROCYTE [DISTWIDTH] IN BLOOD BY AUTOMATED COUNT: 13.2 % (ref 11.5–14.5)
EST. GFR  (AFRICAN AMERICAN): 32 ML/MIN/1.73 M^2
EST. GFR  (NON AFRICAN AMERICAN): 27.7 ML/MIN/1.73 M^2
GLUCOSE SERPL-MCNC: 111 MG/DL (ref 70–110)
GLUCOSE SERPL-MCNC: 128 MG/DL (ref 70–110)
GLUCOSE SERPL-MCNC: 76 MG/DL (ref 70–110)
GLUCOSE SERPL-MCNC: 85 MG/DL (ref 70–110)
GLUCOSE SERPL-MCNC: 87 MG/DL (ref 70–110)
HCT VFR BLD AUTO: 36.1 % (ref 37–48.5)
HGB BLD-MCNC: 11.4 G/DL (ref 12–16)
LIPASE SERPL-CCNC: 24 U/L (ref 4–60)
MAGNESIUM SERPL-MCNC: 1.6 MG/DL (ref 1.6–2.6)
MCH RBC QN AUTO: 28.8 PG (ref 27–31)
MCHC RBC AUTO-ENTMCNC: 31.6 G/DL (ref 32–36)
MCV RBC AUTO: 91 FL (ref 82–98)
PHOSPHATE SERPL-MCNC: 2.8 MG/DL (ref 2.7–4.5)
PLATELET # BLD AUTO: 131 K/UL (ref 150–350)
PMV BLD AUTO: 12 FL (ref 9.2–12.9)
POTASSIUM SERPL-SCNC: 3.9 MMOL/L (ref 3.5–5.1)
RBC # BLD AUTO: 3.96 M/UL (ref 4–5.4)
SODIUM SERPL-SCNC: 140 MMOL/L (ref 136–145)
T4 FREE SERPL-MCNC: 0.9 NG/DL (ref 0.71–1.51)
TSH SERPL DL<=0.005 MIU/L-ACNC: 0.26 UIU/ML (ref 0.34–5.6)
WBC # BLD AUTO: 5.53 K/UL (ref 3.9–12.7)
WBC #/AREA STL HPF: NORMAL /[HPF]

## 2019-09-18 PROCEDURE — 97110 THERAPEUTIC EXERCISES: CPT

## 2019-09-18 PROCEDURE — 85027 COMPLETE CBC AUTOMATED: CPT

## 2019-09-18 PROCEDURE — 97530 THERAPEUTIC ACTIVITIES: CPT

## 2019-09-18 PROCEDURE — 97535 SELF CARE MNGMENT TRAINING: CPT

## 2019-09-18 PROCEDURE — 83690 ASSAY OF LIPASE: CPT

## 2019-09-18 PROCEDURE — 84443 ASSAY THYROID STIM HORMONE: CPT

## 2019-09-18 PROCEDURE — 84439 ASSAY OF FREE THYROXINE: CPT

## 2019-09-18 PROCEDURE — 94640 AIRWAY INHALATION TREATMENT: CPT

## 2019-09-18 PROCEDURE — 63600175 PHARM REV CODE 636 W HCPCS: Performed by: INTERNAL MEDICINE

## 2019-09-18 PROCEDURE — 89055 LEUKOCYTE ASSESSMENT FECAL: CPT

## 2019-09-18 PROCEDURE — 36415 COLL VENOUS BLD VENIPUNCTURE: CPT

## 2019-09-18 PROCEDURE — 83735 ASSAY OF MAGNESIUM: CPT

## 2019-09-18 PROCEDURE — 80048 BASIC METABOLIC PNL TOTAL CA: CPT

## 2019-09-18 PROCEDURE — 84100 ASSAY OF PHOSPHORUS: CPT

## 2019-09-18 PROCEDURE — 25000003 PHARM REV CODE 250: Performed by: INTERNAL MEDICINE

## 2019-09-18 PROCEDURE — 97165 OT EVAL LOW COMPLEX 30 MIN: CPT

## 2019-09-18 PROCEDURE — 82656 EL-1 FECAL QUAL/SEMIQ: CPT

## 2019-09-18 PROCEDURE — 27000221 HC OXYGEN, UP TO 24 HOURS

## 2019-09-18 PROCEDURE — 87507 IADNA-DNA/RNA PROBE TQ 12-25: CPT

## 2019-09-18 PROCEDURE — 12000002 HC ACUTE/MED SURGE SEMI-PRIVATE ROOM

## 2019-09-18 PROCEDURE — 25000242 PHARM REV CODE 250 ALT 637 W/ HCPCS: Performed by: INTERNAL MEDICINE

## 2019-09-18 PROCEDURE — 94761 N-INVAS EAR/PLS OXIMETRY MLT: CPT

## 2019-09-18 RX ORDER — POLYETHYLENE GLYCOL 3350 17 G/17G
170 POWDER, FOR SOLUTION ORAL ONCE
Status: COMPLETED | OUTPATIENT
Start: 2019-09-18 | End: 2019-09-18

## 2019-09-18 RX ORDER — BALSAM PERU/CASTOR OIL
OINTMENT (GRAM) TOPICAL DAILY
Status: DISCONTINUED | OUTPATIENT
Start: 2019-09-18 | End: 2019-09-25 | Stop reason: HOSPADM

## 2019-09-18 RX ORDER — IPRATROPIUM BROMIDE AND ALBUTEROL SULFATE 2.5; .5 MG/3ML; MG/3ML
SOLUTION RESPIRATORY (INHALATION)
Status: DISPENSED
Start: 2019-09-18 | End: 2019-09-19

## 2019-09-18 RX ORDER — POLYETHYLENE GLYCOL 3350 17 G/17G
340 POWDER, FOR SOLUTION ORAL ONCE
Status: DISCONTINUED | OUTPATIENT
Start: 2019-09-18 | End: 2019-09-18

## 2019-09-18 RX ORDER — CHOLESTYRAMINE 4 G/4.8G
1 POWDER, FOR SUSPENSION ORAL 2 TIMES DAILY
Status: DISCONTINUED | OUTPATIENT
Start: 2019-09-18 | End: 2019-09-18

## 2019-09-18 RX ADMIN — FLUTICASONE FUROATE AND VILANTEROL TRIFENATATE 1 PUFF: 100; 25 POWDER RESPIRATORY (INHALATION) at 07:09

## 2019-09-18 RX ADMIN — LOPERAMIDE HYDROCHLORIDE 2 MG: 2 CAPSULE ORAL at 08:09

## 2019-09-18 RX ADMIN — METOPROLOL SUCCINATE 50 MG: 50 TABLET, FILM COATED, EXTENDED RELEASE ORAL at 08:09

## 2019-09-18 RX ADMIN — IPRATROPIUM BROMIDE AND ALBUTEROL SULFATE 3 ML: .5; 3 SOLUTION RESPIRATORY (INHALATION) at 01:09

## 2019-09-18 RX ADMIN — ATORVASTATIN CALCIUM 10 MG: 10 TABLET, FILM COATED ORAL at 08:09

## 2019-09-18 RX ADMIN — POLYETHYLENE GLYCOL 3350 170 G: 17 POWDER, FOR SOLUTION ORAL at 08:09

## 2019-09-18 RX ADMIN — LOPERAMIDE HYDROCHLORIDE 2 MG: 2 CAPSULE ORAL at 12:09

## 2019-09-18 RX ADMIN — PANCRELIPASE 6 CAPSULE: 30000; 6000; 19000 CAPSULE, DELAYED RELEASE PELLETS ORAL at 05:09

## 2019-09-18 RX ADMIN — IPRATROPIUM BROMIDE AND ALBUTEROL SULFATE 3 ML: .5; 3 SOLUTION RESPIRATORY (INHALATION) at 08:09

## 2019-09-18 RX ADMIN — PANTOPRAZOLE SODIUM 20 MG: 20 TABLET, DELAYED RELEASE ORAL at 05:09

## 2019-09-18 RX ADMIN — IPRATROPIUM BROMIDE AND ALBUTEROL SULFATE 3 ML: .5; 3 SOLUTION RESPIRATORY (INHALATION) at 07:09

## 2019-09-18 RX ADMIN — PANCRELIPASE 6 CAPSULE: 30000; 6000; 19000 CAPSULE, DELAYED RELEASE PELLETS ORAL at 08:09

## 2019-09-18 RX ADMIN — PANCRELIPASE 6 CAPSULE: 30000; 6000; 19000 CAPSULE, DELAYED RELEASE PELLETS ORAL at 12:09

## 2019-09-18 RX ADMIN — CHOLESTYRAMINE 4 G: 4 POWDER, FOR SUSPENSION ORAL at 12:09

## 2019-09-18 RX ADMIN — ASPIRIN 81 MG: 81 TABLET, COATED ORAL at 08:09

## 2019-09-18 RX ADMIN — ENOXAPARIN SODIUM 30 MG: 100 INJECTION SUBCUTANEOUS at 05:09

## 2019-09-18 NOTE — SUBJECTIVE & OBJECTIVE
Interval History:  Patient diet was advanced to bland, no recurrent nausea or emesis.  She however continues with cramping diffuse abdominal pain, predominantly lower abdomen, associated with multiple episodes of large volume nonbloody watery stools.  She states she has been having chronic diarrhea since the start the year, usually has about 5 bowel movements daily at home associated with urgency however the volume is usually smaller.  History of colon cancer and cholecystectomy, feels last colonoscopy was last year, followed by gastroenterologist Dr. Alvarez.    Review of Systems   Constitutional: Negative for chills and fever.   Respiratory: Negative for shortness of breath.    Cardiovascular: Negative for chest pain.   Gastrointestinal: Positive for abdominal distention, abdominal pain and diarrhea. Negative for blood in stool, nausea and vomiting.   Genitourinary: Negative for dysuria.   Skin: Positive for wound.   Neurological: Positive for weakness.   Psychiatric/Behavioral: Negative for confusion.     Objective:     Vital Signs (Most Recent):  Temp: 98.2 °F (36.8 °C) (09/18/19 0819)  Pulse: 66 (09/18/19 0819)  Resp: 18 (09/18/19 0819)  BP: 120/68 (09/18/19 0819)  SpO2: 98 % (09/18/19 0819) Vital Signs (24h Range):  Temp:  [97.6 °F (36.4 °C)-98.2 °F (36.8 °C)] 98.2 °F (36.8 °C)  Pulse:  [66-80] 66  Resp:  [18-20] 18  SpO2:  [96 %-99 %] 98 %  BP: (118-130)/(50-68) 120/68     Weight: 87 kg (191 lb 12.8 oz)  Body mass index is 37.46 kg/m².  No intake or output data in the 24 hours ending 09/18/19 0906   Physical Exam   Constitutional: She is oriented to person, place, and time. She appears well-developed and well-nourished. No distress.   Elderly female patient, lying in bed, comfortable, cooperative, obese   HENT:   Head: Normocephalic and atraumatic.   Eyes: Right eye exhibits no discharge. Left eye exhibits no discharge.   Neck: Normal range of motion.   Cardiovascular: Regular rhythm.   Pulmonary/Chest: No  stridor. No respiratory distress. She has no wheezes.   On supplemental oxygen via nasal cannula   Abdominal: Soft. There is no rebound and no guarding.   Positive large abdominal hernia, abdominal tenderness to deep palpation, hyperactive bowel sounds   Musculoskeletal:   Diffuse osteoarthritis changes   Neurological: She is alert and oriented to person, place, and time.   Moving all 4 extremities, hard of hearing   Skin: She is not diaphoretic.   Chronic venous stasis changes with mild erythema bilateral lower extremity with dry skin and edema   Nursing note and vitals reviewed.      Significant Labs:   BMP:   Recent Labs   Lab 09/18/19  0454   GLU 87      K 3.9      CO2 27   BUN 49*   CREATININE 1.8*   CALCIUM 8.4*   MG 1.6     CBC:   Recent Labs   Lab 09/16/19  2250 09/17/19  0416 09/18/19  0454   WBC 16.60* 11.99 5.53   HGB 14.4 12.3 11.4*   HCT 46.3 39.2 36.1*    177 131*     Magnesium:   Recent Labs   Lab 09/17/19  0416 09/18/19  0454   MG 1.6 1.6     All pertinent labs within the past 24 hours have been reviewed.    Significant Imaging: CT: I have reviewed all pertinent results/findings within the past 24 hours and my personal findings are:  Nonobstructing bilateral nephrolithiasis   X-ray Chest Ap Portable    Result Date: 9/17/2019  PROCEDURE:   XR CHEST AP PORTABLE  dated  9/16/2019 10:45 PM CLINICAL HISTORY:   Female 74 years of age.   chest pain, low o2, hx of copd, cad, chf, htn, ca TECHNIQUE: AP view of the chest obtained portably at 10:45 PM. PREVIOUS STUDIES:  May 10, 2019. January 25, 2015. November 14, 2013. FINDINGS: There is a vascular catheter from right subclavian venous approach. The tip is at the level of the confluence of the right and left brachiocephalic veins. This catheter apparently has been present since at least November 2013. Patient is rotated mildly to the right. Mild cardiomegaly is stable compared with the most recent study which is radiography from May 2019.  The lungs are clear. There is no pleural effusion or pneumothorax. IMPRESSION: 1. No acute findings. 2. Central vascular catheter apparently present since at least 2013. Electronically Signed by Tessie Orellana on 9/17/2019 6:51 AM    Us Lower Extremity Veins Bilateral    Result Date: 8/22/2019  EXAMINATION: US LOWER EXTREMITY VEINS BILATERAL CLINICAL HISTORY: Localized edema COMPARISON: None. FINDINGS: Real-time, duplex and color Doppler interrogation is performed. This demonstrates patency of the common and superficial femoral veins, popliteal veins, major calf veins and greater saphenous vein bilaterally. There are no findings of deep vein thrombosis.     No evidence of deep vein thrombosis involving the bilateral lower extremities. Electronically signed by: Pallavi Whitley IV., MD Date:    08/22/2019 Time:    12:35    Ct Abdomen Pelvis  Without Contrast    Result Date: 9/17/2019  CMS MANDATED QUALITY DATA - CT RADIATION - 436 All CT exams at this facility use dose modulation, iterative reconstruction, and or weight based dosing when appropriate to reduce radiation dose to as low as reasonably achievable. HISTORY: Acute abdominal pain, gastroenteritis or colitis. FINDINGS: Noncontrast axial images were obtained. Nonenhanced study is tailored for the detection of urolithiasis, and is insensitive for abnormalities of the solid organs, vasculature and hollow viscera. Comparison to 05/10/2019. CT ABDOMEN: There are scattered linear and groundglass opacities at both lung bases, suggesting subsegmental atelectasis, with the lung bases otherwise clear. Branching linear lucencies in the left hepatic lobe are suggestive of pneumobilia. There are cholecystectomy clips, with mild extrahepatic biliary ductal dilatation. The unenhanced liver is otherwise unremarkable. 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 There are bilateral nonobstructing renal calculi, with no ureteral calculi or hydroureteronephrosis. Moderate diffuse calcified plaque involves normal caliber abdominal aorta and iliac arteries. Large periampullary duodenal diverticulum is noted. There is no bowel obstruction, ascites, or intraperitoneal free air. The appendix is normal. CT PELVIS: There are no distal ureteral or bladder calculi. The unenhanced sigmoid colon, rectum and urinary bladder are unremarkable. There are scattered pelvic arterial vascular calcifications, with no pelvic free fluid. No inguinal or femoral hernia. There is degenerative intervertebral disc space narrowing and facet arthropathy in the lumbar spine, with advanced degenerative narrowing of both hip joint spaces, with complete chondral loss, prominent osteophytes, and subcortical sclerosis and cystic changes. Fat density mass within the left gluteal musculature is suggestive of intramuscular lipoma. IMPRESSION: 1. No definite acute findings in the abdomen or the pelvis. 2. Additional observations as above. Electronically Signed by Kev PATEL on 9/17/2019 2:30 AM  ECG Results          EKG 12-lead (Final result)  Result time 09/17/19 11:59:25    Final result by Interface, Lab In [facility] (09/17/19 11:59:25)                 Narrative:    Test Reason : R06.02,    Vent. Rate : 088 BPM     Atrial Rate : 088 BPM     P-R Int : 164 ms          QRS Dur : 090 ms      QT Int : 370 ms       P-R-T Axes : 073 073 074 degrees     QTc Int : 447 ms    Normal sinus rhythm  Nonspecific ST and T wave abnormality  Abnormal ECG  No previous ECGs available  Confirmed by Abhinav Freeman MD ([id number]) on 9/17/2019 11:59:20 AM    Referred By: [id number]   SELF           Confirmed By:Abhinav Freeman MD

## 2019-09-18 NOTE — PLAN OF CARE
Problem: Physical Therapy Goal  Goal: Physical Therapy Goal  Goals to be met by: discharge     Patient will increase functional independence with mobility by performin. Supine to sit with Stand-by Assistance  2. Sit to stand transfer with Stand-by Assistance  3. Bed to chair transfer with Stand-by Assistance using Rolling Walker  4. Gait  x 125 feet with Stand-by Assistance using Rolling Walker.      Outcome: Ongoing (interventions implemented as appropriate)  Patient participated in therapeutic exercise and therapeutic activities to improve and strength functional mobility and improve ADL's to reduce fall risk.

## 2019-09-18 NOTE — PLAN OF CARE
Problem: Skin Injury Risk Increased  Goal: Skin Health and Integrity  Outcome: Ongoing (interventions implemented as appropriate)  Encourage patient to reposition frequently in bed.

## 2019-09-18 NOTE — PLAN OF CARE
Problem: Fall Injury Risk  Goal: Absence of Fall and Fall-Related Injury  Outcome: Ongoing (interventions implemented as appropriate)  Bed alarm on. Call light within reach.

## 2019-09-18 NOTE — ASSESSMENT & PLAN NOTE
Erythema to the sacral area.  Present on arrival.    Seen by wound care and recommendations for Mepilex.

## 2019-09-18 NOTE — PT/OT/SLP EVAL
"Occupational Therapy   Evaluation    Name: Marisol Kerr  MRN: [id number]  Admitting Diagnosis:  Diarrhea      Recommendations:     Discharge Recommendations: home with home health  Discharge Equipment Recommendations:  none  Barriers to discharge:       Assessment:     Marisol Kerr is a 74 y.o. female with a medical diagnosis of Diarrhea.  Pt agreeable to OT therapy session this AM. Performance deficits affecting function: weakness, impaired endurance, impaired self care skills, impaired balance, gait instability, impaired functional mobilty, decreased safety awareness, pain.  Pt reports having pain on her lower back/bottom due to "marbles", stating she has a special cushion at home that she sits on that helps with the pain.     Rehab Prognosis: Good; patient would benefit from acute skilled OT services to address these deficits and reach maximum level of function.       Plan:     Patient to be seen 5 x/week to address the above listed problems via self-care/home management, therapeutic activities, therapeutic exercises  · Plan of Care Expires: 10/18/19  · Plan of Care Reviewed with: patient    Subjective     Chief Complaint: Stomach cramping  Patient/Family Comments/goals: to return home    Occupational Profile:  Living Environment: Pt lives alone in a 1 story house with a small threshold step to enter. Pt has a walk-in shower with a shower chair and a standard height toilet. Pt has grab bars around shower and toilet. Pt reports she sleeps in her recliner currently because she is waiting for an adjustable bed to arrive.  Previous level of function: Mod I with rollator ADLs/most IADLs.. Daughter helps clean the house. Pt able to go out to the store if she has a cart to ride in. Pt does not wear socks at home and reports uses a reacher and dressing stick to help get dressed. Pt does not drive. Pt is [place] and wears glasses.  Equipment Used at Home:  shower chair, rollator, cane, quad, wheelchair, grab bar(dressing stick, " reacher)  Assistance upon Discharge: yes, from friends and family    Pain/Comfort:  · Pain Rating 1: 5/10  · Location - Orientation 1: lower  · Location 1: back  · Pain Addressed 1: Reposition, Distraction    Patients cultural, spiritual, Congregation conflicts given the current situation:      Objective:     Communicated with: nursing prior to session.  Patient found HOB elevated with telemetry, peripheral IV, oxygen upon OT entry to room.    General Precautions: Standard, fall   Orthopedic Precautions:N/A   Braces: N/A     Occupational Performance:    Bed Mobility:    · Patient completed Scooting/Bridging with stand by assistance  · Patient completed Supine to Sit with contact guard assistance   · Patient completed Sit to Supine with minimum assistance for LE's only    Functional Mobility/Transfers:  · Patient completed Sit <> Stand Transfer with contact guard assistance  with  rolling walker     Activities of Daily Living:  · Feeding:  modified independence pt wears dentures  · Grooming: contact guard assistance while in standing    Cognitive/Visual Perceptual:  Cognitive/Psychosocial Skills:     -       Oriented to: Person, Place, Time and Situation   -       Follows Commands/attention:Follows multistep  commands  -       Communication: clear/fluent  -       Memory: No Deficits noted  -       Safety awareness/insight to disability: impaired   -       Mood/Affect/Coping skills/emotional control: Appropriate to situation, Cooperative and Pleasant    Physical Exam:  Balance:    -       sitting good; standing static/dynamic fair  Dominant hand:    -       right  Upper Extremity Range of Motion:     -       Right Upper Extremity: WFL  -       Left Upper Extremity: WFL  Upper Extremity Strength:    -       Right Upper Extremity: 4/5  -       Left Upper Extremity: 4/5   Strength:    -       Right Upper Extremity: WFL  -       Left Upper Extremity: WFL    AMPAC 6 Click ADL:  AMPAC Total Score: 20    Treatment &  Education:  Pt educated on role of OT, POC, and Education:   educated on importance of out of bed ADL activity to negate effects of prolong bed rest. bed mobility, EOB sitting ~10 minutes focusing on core strengthening.     Patient left HOB elevated with all lines intact and call button in reach    GOALS:   Multidisciplinary Problems     Occupational Therapy Goals        Problem: Occupational Therapy Goal    Goal Priority Disciplines Outcome Interventions   Occupational Therapy Goal     OT, PT/OT     Description:  Goals to be met by: discharge    Patient will increase functional independence with ADLs by performing:    UE Dressing with Supervision.  LE Dressing with Supervision.  Grooming while standing at sink with Supervision.  Toileting from bedside commode with Supervision for hygiene and clothing management.   Toilet transfer to bedside commode with Supervision.                      History:     Past Medical History:   Diagnosis Date    CAD (coronary artery disease)     Cancer     colon    CHF (congestive heart failure)     Colon cancer     COPD (chronic obstructive pulmonary disease)     Decreased hearing     Diabetes mellitus     Diabetes mellitus, type 2     Hypercholesterolemia     Hypertension     Hypoxemia     Insomnia     Obesity     Osteoarthritis        Past Surgical History:   Procedure Laterality Date    CHOLECYSTECTOMY      COLECTOMY      EXTERNAL EAR SURGERY         Time Tracking:     OT Date of Treatment: 09/18/19  OT Start Time: 1032  OT Stop Time: 1105  OT Total Time (min): 33 min    Billable Minutes:Evaluation 10  Self Care/Home Management 13  Therapeutic Activity 10    Nani Lea OT  9/18/2019

## 2019-09-18 NOTE — PROGRESS NOTES
74 yr old female.  Hx of a colon tumor with chronic diarrhea incont at times.       09/17/19 1300        Pressure Injury 09/17/19 1300 Buttocks Stage 1   Date First Assessed/Time First Assessed: 09/17/19 1300   Pressure Injury Present on Admission: yes  Location: Buttocks  Is this injury device related?: No  Staging: Stage 1   Wound Image   (This is a start of a stage 1 bilat )   Staging Stage 1   Dressing Appearance Open to air   Drainage Amount None   Appearance Pink;Red;Dry   Periwound Area Redness   Wound Length (cm) 2 cm   Wound Width (cm) 2 cm   Wound Surface Area (cm^2) 4 cm^2   Care Cleansed with:;Antimicrobial agent;Skin Barrier     Recommendation:  Clean area with chlorhexidine/ns  Pat dry  Apply venelex and cover with mepilex. Reposition Q2hr.     Bilat lower legs hx of venous statis ulcers. Legs with scars dry and flakey.  Recommendation: Bilat lower leg   Clean with chlorhexidine/ns. Pat dry. Apply Hydrophor daily.

## 2019-09-18 NOTE — PT/OT/SLP PROGRESS
Physical Therapy Treatment    Patient Name:  Marisol Kerr   MRN:  [id number]    Recommendations:     Discharge Recommendations:  home health PT   Discharge Equipment Recommendations: none   Barriers to discharge: None    Assessment:     Marisol Kerr is a 74 y.o. female admitted with a medical diagnosis of Diarrhea.  She presents with the following impairments/functional limitations:  weakness, impaired endurance, impaired sensation, impaired self care skills, impaired functional mobilty, gait instability, decreased lower extremity function, impaired balance, decreased safety awareness. Patient had diarrhea and was not able to ambulate today. It also affected the amount of exercise she could do in supine. Her B heels were very red and dry. Pressure relief boots were place on pt and RN was notified. She was able to perform limited bed mobility and exercise today. Continue with PT's POC.    Rehab Prognosis: Good and Fair; patient would benefit from acute skilled PT services to address these deficits and reach maximum level of function.    Recent Surgery: Procedure(s) (LRB):  SIGMOIDOSCOPY, FLEXIBLE (N/A)      Plan:     During this hospitalization, patient to be seen 5 x/week to address the identified rehab impairments via gait training, therapeutic activities, therapeutic exercises and progress toward the following goals:    · Plan of Care Expires:  10/17/19    Subjective     Chief Complaint: diarrhea and cramping after therapy started. B foot/heel pain upon first asking patient.  Patient/Family Comments/goals: To get better  Pain/Comfort:  · Pain Rating 1: 5/10  · Location - Side 1: Left  · Location 1: foot  · Pain Addressed 1: Pre-medicate for activity, Reposition, Distraction(placed heel protectors on BLE to reduce pressure)  · Pain Rating Post-Intervention 1: 2/10      Objective:     Communicated with OFELIA Gloria prior to session.  Patient found HOB elevated with pressure relief boots, telemetry, oxygen upon PT entry to  room.     General Precautions: Standard, fall   Orthopedic Precautions:N/A   Braces:       Functional Mobility:  · Bed Mobility:     · Rolling Left:  minimum assistance and moderate assistance  · Rolling Right: minimum assistance and moderate assistance  · Scooting: moderate assistance and maximal assistance  · Bridging: minimum assistance  · Supine to Sit: maximal assistance  · Sit to Supine: maximal assistance      AM-PAC 6 CLICK MOBILITY          Therapeutic Activities and Exercises:  Supine exercises to increase lower extremity strength to decrease fall risk for return to PLOF: Ankle pumps, Heel slides, Hip Ab/ADD, Qs, Gs, Bridges x 10. Applied lotion on B feet and placed socks on and then pressure relief boots to reduce pain and skin break down.    Patient left HOB elevated with all lines intact, call button in reach and RN Juani notified..    GOALS:   Multidisciplinary Problems     Physical Therapy Goals        Problem: Physical Therapy Goal    Goal Priority Disciplines Outcome Goal Variances Interventions   Physical Therapy Goal     PT, PT/OT Ongoing (interventions implemented as appropriate)     Description:  Goals to be met by: discharge     Patient will increase functional independence with mobility by performin. Supine to sit with Stand-by Assistance  2. Sit to stand transfer with Stand-by Assistance  3. Bed to chair transfer with Stand-by Assistance using Rolling Walker  4. Gait  x 125 feet with Stand-by Assistance using Rolling Walker.                       Time Tracking:     PT Received On: 19  PT Start Time: 1430     PT Stop Time: 1500  PT Total Time (min): 30 min     Billable Minutes: Therapeutic Activity 15 and Therapeutic Exercise 15    Treatment Type: Treatment  PT/PTA: PTA     PTA Visit Number: 1     Ronda Jaramillo, PTA  2019

## 2019-09-18 NOTE — ASSESSMENT & PLAN NOTE
Reports chronic diarrhea about 1 year duration.  History of colon cancer and cholecystectomy.  Acute interval worsening with cramping abdominal pain.  CT abdomen and pelvis reviewed.  Stool studies negative including C diff.  Medications reviewed-no definite inciting drugs.  Check TSH.  Continue Imodium q.i.d.-home medication  Added Questran.  Consult Gastroenterology for further recommendations.

## 2019-09-18 NOTE — PROGRESS NOTES
[facility]  Progress Note    Patient Name: Marisol Kerr  MRN: [id number]  Patient Class: IP- Inpatient   Admission Date: 9/16/2019  Length of Stay: 1 days  Attending Physician: Maci Glover MD  Primary Care Provider: Khadar Dwyer MD        Subjective:     Principal Problem:<principal problem not specified>        HPI:  74-year-old female history of COPD on 3 L oxygen or home, hypertension, hyperlipidemia, IDDM, S/P cholecystectomy, history colon cancer previously on chemotherapy who lives at home alone comes in for abdominal pain, nausea and vomiting, and diarrhea. Patient reports that since January after she had her last colonoscopy she is having chronic diarrhea that she improves with Imodium. Today she was in usual health about 8 PM the day before admission when she suddenly developed nausea with nonbloody nonbilious emesis while she was in the phone with a friend. Reports having 2 episodes of emesis. Patient then attempted to walk to the bathroom but had an episode of diarrhea before she reached the bathroom. [place] then [place] and had another several episodes of nausea and vomiting associated with epigastric/chest pain. Describes chest pain as sharp radiating to her back type of pain. Denies any shortness of breath, diaphoresis. Patient's friend became concerned and brought patient to the ED for further evaluation. Reports drinking alcohol on holidays and has not had a drink in several months. In the ED, patient was found to have a lipase of 200. Initial troponin was negative and EKG was unremarkable.    Overview/Hospital Course:  Patient was admitted to the hospital for management of acute pancreatitis.  Antiemetics and as needed pain medications was ordered.  Her diet was slowly advanced, no recurrent nausea or emesis.  However she continued with cramping abdominal pain associated with multiple large volume diarrhea, acute on chronic.  She was seen by therapy  with recommendations for home health and patient was accepting of same.  Seen by wound care, stage I sacral decubitus with dry lower extremity, recommendations noted.    Interval History:  Patient diet was advanced to bland, no recurrent nausea or emesis.  She however continues with cramping diffuse abdominal pain, predominantly lower abdomen, associated with multiple episodes of large volume nonbloody watery stools.  She states she has been having chronic diarrhea since the start the year, usually has about 5 bowel movements daily at home associated with urgency however the volume is usually smaller.  History of colon cancer and cholecystectomy, feels last colonoscopy was last year, followed by gastroenterologist Dr. Alvarez.    Review of Systems   Constitutional: Negative for chills and fever.   Respiratory: Negative for shortness of breath.    Cardiovascular: Negative for chest pain.   Gastrointestinal: Positive for abdominal distention, abdominal pain and diarrhea. Negative for blood in stool, nausea and vomiting.   Genitourinary: Negative for dysuria.   Skin: Positive for wound.   Neurological: Positive for weakness.   Psychiatric/Behavioral: Negative for confusion.     Objective:     Vital Signs (Most Recent):  Temp: 98.2 °F (36.8 °C) (09/18/19 0819)  Pulse: 66 (09/18/19 0819)  Resp: 18 (09/18/19 0819)  BP: 120/68 (09/18/19 0819)  SpO2: 98 % (09/18/19 0819) Vital Signs (24h Range):  Temp:  [97.6 °F (36.4 °C)-98.2 °F (36.8 °C)] 98.2 °F (36.8 °C)  Pulse:  [66-80] 66  Resp:  [18-20] 18  SpO2:  [96 %-99 %] 98 %  BP: (118-130)/(50-68) 120/68     Weight: 87 kg (191 lb 12.8 oz)  Body mass index is 37.46 kg/m².  No intake or output data in the 24 hours ending 09/18/19 0906   Physical Exam   Constitutional: She is oriented to person, place, and time. She appears well-developed and well-nourished. No distress.   Elderly female patient, lying in bed, comfortable, cooperative, obese   HENT:   Head: Normocephalic and  atraumatic.   Eyes: Right eye exhibits no discharge. Left eye exhibits no discharge.   Neck: Normal range of motion.   Cardiovascular: Regular rhythm.   Pulmonary/Chest: No stridor. No respiratory distress. She has no wheezes.   On supplemental oxygen via nasal cannula   Abdominal: Soft. There is no rebound and no guarding.   Positive large abdominal hernia, abdominal tenderness to deep palpation, hyperactive bowel sounds   Musculoskeletal:   Diffuse osteoarthritis changes   Neurological: She is alert and oriented to person, place, and time.   Moving all 4 extremities, hard of hearing   Skin: She is not diaphoretic.   Chronic venous stasis changes with mild erythema bilateral lower extremity with dry skin and edema   Nursing note and vitals reviewed.      Significant Labs:   BMP:   Recent Labs   Lab 09/18/19  0454   GLU 87      K 3.9      CO2 27   BUN 49*   CREATININE 1.8*   CALCIUM 8.4*   MG 1.6     CBC:   Recent Labs   Lab 09/16/19  2250 09/17/19  0416 09/18/19  0454   WBC 16.60* 11.99 5.53   HGB 14.4 12.3 11.4*   HCT 46.3 39.2 36.1*    177 131*     Magnesium:   Recent Labs   Lab 09/17/19  0416 09/18/19  0454   MG 1.6 1.6     All pertinent labs within the past 24 hours have been reviewed.    Significant Imaging: CT: I have reviewed all pertinent results/findings within the past 24 hours and my personal findings are:  Nonobstructing bilateral nephrolithiasis   X-ray Chest Ap Portable    Result Date: 9/17/2019  PROCEDURE:   XR CHEST AP PORTABLE  dated  9/16/2019 10:45 PM CLINICAL HISTORY:   Female 74 years of age.   chest pain, low o2, hx of copd, cad, chf, htn, ca TECHNIQUE: AP view of the chest obtained portably at 10:45 PM. PREVIOUS STUDIES:  May 10, 2019. January 25, 2015. November 14, 2013. FINDINGS: There is a vascular catheter from right subclavian venous approach. The tip is at the level of the confluence of the right and left brachiocephalic veins. This catheter apparently has been  present since at least November 2013. Patient is rotated mildly to the right. Mild cardiomegaly is stable compared with the most recent study which is radiography from May 2019. The lungs are clear. There is no pleural effusion or pneumothorax. IMPRESSION: 1. No acute findings. 2. Central vascular catheter apparently present since at least 2013. Electronically Signed by Tessie Orellana on 9/17/2019 6:51 AM    Us Lower Extremity Veins Bilateral    Result Date: 8/22/2019  EXAMINATION: US LOWER EXTREMITY VEINS BILATERAL CLINICAL HISTORY: Localized edema COMPARISON: None. FINDINGS: Real-time, duplex and color Doppler interrogation is performed. This demonstrates patency of the common and superficial femoral veins, popliteal veins, major calf veins and greater saphenous vein bilaterally. There are no findings of deep vein thrombosis.     No evidence of deep vein thrombosis involving the bilateral lower extremities. Electronically signed by: Pallavi Whitley IV., MD Date:    08/22/2019 Time:    12:35    Ct Abdomen Pelvis  Without Contrast    Result Date: 9/17/2019  CMS MANDATED QUALITY DATA - CT RADIATION - 436 All CT exams at this facility use dose modulation, iterative reconstruction, and or weight based dosing when appropriate to reduce radiation dose to as low as reasonably achievable. HISTORY: Acute abdominal pain, gastroenteritis or colitis. FINDINGS: Noncontrast axial images were obtained. Nonenhanced study is tailored for the detection of urolithiasis, and is insensitive for abnormalities of the solid organs, vasculature and hollow viscera. Comparison to 05/10/2019. CT ABDOMEN: There are scattered linear and groundglass opacities at both lung bases, suggesting subsegmental atelectasis, with the lung bases otherwise clear. Branching linear lucencies in the left hepatic lobe are suggestive of pneumobilia. There are cholecystectomy clips, with mild extrahepatic biliary ductal dilatation. The unenhanced liver is otherwise  unremarkable. The unenhanced spleen, pancreas and right adrenal gland are unremarkable, with mixed fat density and soft tissue density left adrenal mass measuring 3.4 cm, compatible with myelolipoma. There are bilateral nonobstructing renal calculi, with no ureteral calculi or hydroureteronephrosis. Moderate diffuse calcified plaque involves normal caliber abdominal aorta and iliac arteries. Large periampullary duodenal diverticulum is noted. There is no bowel obstruction, ascites, or intraperitoneal free air. The appendix is normal. CT PELVIS: There are no distal ureteral or bladder calculi. The unenhanced sigmoid colon, rectum and urinary bladder are unremarkable. There are scattered pelvic arterial vascular calcifications, with no pelvic free fluid. No inguinal or femoral hernia. There is degenerative intervertebral disc space narrowing and facet arthropathy in the lumbar spine, with advanced degenerative narrowing of both hip joint spaces, with complete chondral loss, prominent osteophytes, and subcortical sclerosis and cystic changes. Fat density mass within the left gluteal musculature is suggestive of intramuscular lipoma. IMPRESSION: 1. No definite acute findings in the abdomen or the pelvis. 2. Additional observations as above. Electronically Signed by Kev PATEL on 9/17/2019 2:30 AM  ECG Results          EKG 12-lead (Final result)  Result time 09/17/19 11:59:25    Final result by Interface, Lab In [facility] (09/17/19 11:59:25)                 Narrative:    Test Reason : R06.02,    Vent. Rate : 088 BPM     Atrial Rate : 088 BPM     P-R Int : 164 ms          QRS Dur : 090 ms      QT Int : 370 ms       P-R-T Axes : 073 073 074 degrees     QTc Int : 447 ms    Normal sinus rhythm  Nonspecific ST and T wave abnormality  Abnormal ECG  No previous ECGs available  Confirmed by Abhinav Freeman MD ([id number]) on 9/17/2019 11:59:20 AM    Referred By: [email or web address]   SELF           Confirmed By:Abhinav Freeman MD                                   Assessment/Plan:      Acute on chronic diarrhea  Reports chronic diarrhea about 1 year duration.  History of colon cancer and cholecystectomy.  Acute interval worsening with cramping abdominal pain.  CT abdomen and pelvis reviewed.  Stool studies negative including C diff.  Medications reviewed-no definite inciting drugs.  Check TSH.  Continue Imodium q.i.d.-home medication  Added Questran.  Consult Gastroenterology for further recommendations.    Bilateral nonobstructing nephrolithiasis        Ventral hernia        Decubitus ulcer of sacral region, stage 1  Erythema to the sacral area.  Present on arrival.    Seen by wound care and recommendations for Mepilex.      History of cholecystectomy  Was complicated by retained stone needing ERCP.  Now chronic diarrhea.      Chronic hypoxemic respiratory failure  On 3 L home oxygen.      COPD (chronic obstructive pulmonary disease)  No evidence of acute exacerbation      CKD (chronic kidney disease), stage III  Renally  dose all medications and avoid nephrotoxin drugs.      Essential hypertension, benign  Continue home medications and monitor.        VTE Risk Mitigation (From admission, onward)        Ordered     enoxaparin injection 30 mg  Daily      09/17/19 0337     IP VTE HIGH RISK PATIENT  Once      09/17/19 0238                Maci Glover MD  Department of Hospital Medicine   [facility]

## 2019-09-18 NOTE — CONSULTS
"GASTROENTEROLOGY INPATIENT CONSULT NOTE  Patient Name: Marisol Kerr  Patient MRN: [id number]  Patient : 1945    Admit Date: 2019  Service date: 2019    Reason for Consult: diarrhea / weakness    PCP: Khadar Dwyer MD    Chief Complaint   Patient presents with    Chest Pain     "vomiting / diarrhea / short of breath" home O2 at 3 l/min        HPI: Patient is a 74 y.o. female with PMHx cholecystectomy, CAD / CHF (ASA), HTN, HLD, DOM, COPD, OA, colon ca s/p partial colectomy presents for evaluation of diarrhea. Acute / chronic x 1 year, constant, progressive over past weeks w/o any obvious inciting factors. No f/c, rectal bleeding or other issues. Recentlly started NSAIDs for OA.     CHART REVIEW;  CT abd  - Nml liver, panc; Mild biliary dilation s/p jessenia; duod diverticulum; nml bowels  GI PCR  - Negative  Colon  - tics, polyps, hemorrhoids; random biopsies not taken  ERCP  - sphincterotomy / stone extraction    Past Medical History:  Past Medical History:   Diagnosis Date    CAD (coronary artery disease)     Cancer     colon    CHF (congestive heart failure)     Colon cancer     COPD (chronic obstructive pulmonary disease)     Decreased hearing     Diabetes mellitus     Diabetes mellitus, type 2     Hypercholesterolemia     Hypertension     Hypoxemia     Insomnia     Obesity     Osteoarthritis         Past Surgical History:  Past Surgical History:   Procedure Laterality Date    CHOLECYSTECTOMY      COLECTOMY      EXTERNAL EAR SURGERY          Home Medications:  Medications Prior to Admission   Medication Sig Dispense Refill Last Dose    acetaminophen (TYLENOL) 500 MG tablet Take 500 mg by mouth every 6 (six) hours as needed for Pain.    9/15/2019 at 15:00    albuterol (VENTOLIN HFA) 90 mcg/actuation inhaler Inhale 2 puffs into the lungs every 6 (six) hours as needed for Wheezing or Shortness of Breath. Rescue   Past Month at Unknown time    aspirin (ECOTRIN) 81 " MG EC tablet Take 81 mg by mouth every morning.    9/16/2019 at 09:00    coenzyme Q10 100 mg capsule Take 100 mg by mouth every morning.   9/16/2019 at 09:00    CREON 36,000-114,000- 180,000 unit CpDR Take 1 capsule by mouth 4 (four) times daily.   9/16/2019 at 18:00    dextran 70-hypromellose (ARTIFICIAL TEARS,[id number],) 0.1-0.3 % Drop Place 1 drop into both eyes daily as needed.   Past Month at Unknown time    ergocalciferol (VITAMIN D2) 50,000 unit Cap Take 1 capsule (50,000 Units total) by mouth every 7 days. (Patient taking differently: Take 50,000 Units by mouth every 7 days. ON SUNDAYS) 12 capsule 0 9/15/2019 at 09:00    fish oil-omega-3 fatty acids 300-1,000 mg capsule Take 1 capsule by mouth every morning.   9/16/2019 at 09:00    FLAXSEED OIL ORAL Take 1,200 mg by mouth every morning.   9/16/2019 at 09:00    fluticasone-salmeterol diskus inhaler 250-50 mcg Inhale 1 puff into the lungs 2 (two) times daily. 3 each 1 9/16/2019 at 09:00    furosemide (LASIX) 20 MG tablet Take 20 mg by mouth every morning.    9/16/2019 at 09:00    ipratropium-albuterol (COMBIVENT RESPIMAT)  mcg/actuation inhaler Inhale 2 puffs into the lungs every 4 (four) hours as needed. Rescue (Patient taking differently: Inhale 2 puffs into the lungs every 4 (four) hours as needed for Shortness of Breath. Rescue) 3 Package 1 Past Month at Unknown time    loperamide (IMODIUM) 2 mg capsule Take 1 capsule (2 mg total) by mouth 4 (four) times daily. 90 capsule 0 9/16/2019 at 18:00    lovastatin (MEVACOR) 20 MG tablet Take 20 mg by mouth every evening.    9/15/2019 at 21:00    meloxicam (MOBIC) 7.5 MG tablet Take 1 tablet (7.5 mg total) by mouth once daily. (Patient taking differently: Take 7.5 mg by mouth every morning. ) 90 tablet 1 9/16/2019 at 09:00    metFORMIN (GLUCOPHAGE) 500 MG tablet Take 500 mg by mouth daily with breakfast.   9/16/2019 at 09:00    metoprolol succinate (TOPROL-XL) 50 MG 24 hr tablet Take 50 mg by  mouth every morning.   9/16/2019 at 09:00    nitroGLYCERIN 0.2 mg/hr TD PT24 (NITRODUR) 0.2 mg/hr Place 1 patch onto the skin every morning.   9/16/2019 at 09:00    nitroGLYCERIN 0.4 MG/DOSE TL SPRY (NITROLINGUAL) 400 mcg/spray spray Place 1 spray under the tongue every 5 (five) minutes as needed for Chest pain (DO NOT EXCEED A TOTAL OF 3 SPRAYS IN 15 MINUTES). 4.9 g 0 9/16/2019 at 21:30    ondansetron (ZOFRAN-ODT) 4 MG TbDL Take 4 mg by mouth every 6 (six) hours as needed.   9/16/2019 at 16:15    pantoprazole (PROTONIX) 20 MG tablet Take 1 tablet (20 mg total) by mouth once daily. (Patient taking differently: Take 20 mg by mouth every morning. ) 90 tablet 0 9/16/2019 at 09:00    PNV NO.115/IRON FUMARATE/FA (PRENATAL #115-IRON-FOLIC ACID ORAL) Take 1 tablet by mouth every morning.    9/16/2019 at 09:00    umeclidinium (INCRUSE ELLIPTA) 62.5 mcg/actuation DsDv Inhale 62.5 mcg into the lungs every morning. Controller   9/16/2019 at 09:00    vit C/E/Zn/coppr/lutein/zeaxan (PRESERVISION AREDS-2 ORAL) Take 1 capsule by mouth every morning.   9/16/2019 at 09:00    vitamin D (VITAMIN D3) 1000 units Tab Take 1,000 Units by mouth every morning.   9/16/2019 at 09:00    albuterol (PROVENTIL) 2.5 mg /3 mL (0.083 %) nebulizer solution USE ONE VIAL IN NEBULIZER EVERY 6 HOURS AS NEEDED FOR WHEEZING (Patient taking differently: Take 2.5 mg by nebulization every 6 (six) hours as needed for Wheezing. USE ONE VIAL IN NEBULIZER EVERY 6 HOURS AS NEEDED FOR WHEEZING) 150 each 2 More than a month at Unknown time    benazepril (LOTENSIN) 40 MG tablet Take 1 tablet (40 mg total) by mouth once daily. (Patient taking differently: Take 40 mg by mouth every morning. ) 90 tablet 1 Taking    fluticasone (FLONASE) 50 mcg/actuation nasal spray 2 sprays (100 mcg total) by Each Nare route once daily. (Patient taking differently: 2 sprays by Each Nostril route daily as needed for Allergies. ) 16 g 3 More than a month at Unknown time        Inpatient Medications:   albuterol-ipratropium  3 mL Nebulization Q6H WAKE    aspirin  81 mg Oral QAM    atorvastatin  10 mg Oral Daily    balsam peru-castor oil   Topical (Top) Daily    cholestyramine-aspartame  1 packet Oral BID    enoxaparin  30 mg Subcutaneous Daily    fluticasone furoate-vilanterol  1 puff Inhalation Daily    lipase-protease-amylase 6,000-19,000-30,000 units  6 capsule Oral QID (WM & HS)    loperamide  2 mg Oral QID    metoprolol succinate  50 mg Oral QAM    pantoprazole  20 mg Oral QAM    umeclidinium  62.5 mcg Inhalation QAM    white petrolatum   Topical (Top) Daily     acetaminophen, acetaminophen, albuterol-ipratropium, dextrose 50%, dextrose 50%, glucagon (human recombinant), glucose, glucose, HYDROcodone-acetaminophen, influenza, insulin aspart U-100, magnesium oxide, magnesium oxide, ondansetron, potassium chloride 10%, potassium chloride 10%, potassium, sodium phosphates, potassium, sodium phosphates, potassium, sodium phosphates, sodium chloride 0.9%    Review of patient's allergies indicates:   Allergen Reactions    Iodinated contrast media     Strawberries [strawberry] Hives and Itching       Social History:   Social History     Occupational History    Not on file   Tobacco Use    Smoking status: Former Smoker     Packs/day: 3.00     Years: 50.00     Pack years: 150.00     Types: Cigarettes     Start date: 3/30/1964     Last attempt to quit: 2006     Years since quittin.5    Smokeless tobacco: Never Used   Substance and Sexual Activity    Alcohol use: Yes    Drug use: No    Sexual activity: Never       Family History:   Family History   Problem Relation Age of Onset    Febrile seizures Mother     Heart failure Father        Review of Systems:  A 10 point review of systems was performed and was normal, except as mentioned in the HPI, including constitutional, HEENT, heme, lymph, cardiovascular, respiratory, gastrointestinal, genitourinary,  neurologic, endocrine, psychiatric and musculoskeletal.      OBJECTIVE:    Physical Exam:  24 Hour Vital Sign Ranges: Temp:  [97.9 °F (36.6 °C)-98.8 °F (37.1 °C)] 98.8 °F (37.1 °C)  Pulse:  [66-80] 72  Resp:  [18-20] 18  SpO2:  [96 %-99 %] 97 %  BP: (118-133)/(50-72) 133/72  Most recent vitals: /72   Pulse 72   Temp 98.8 °F (37.1 °C) (Oral)   Resp 18   Ht 5' (1.524 m)   Wt 87 kg (191 lb 12.8 oz)   SpO2 97%   Breastfeeding? No   BMI 37.46 kg/m²    GEN: well-developed, well-nourished, awake and alert, non-toxic appearing elderly WF  HEENT: PERRL, sclera anicteric, oral mucosa pink and moist without lesion  NECK: trachea midline; Good ROM  CV: regular rate and rhythm, no murmurs or gallops  RESP: clear to auscultation bilaterally, no wheezes, rhonci or rales  ABD: soft, non-tender, non-distended, normal bowel sounds, no hepatosplenomegaly, no hernias  EXT: no swelling or edema, 2+ pulses distally  SKIN: no rashes or jaundice  PSYCH: normal affect    Labs:   Recent Labs     09/16/19 2250 09/17/19 0416 09/18/19  0454   WBC 16.60* 11.99 5.53   MCV 91 91 91    177 131*     Recent Labs     09/16/19 2250 09/17/19  0416 09/18/19  0454    139 140   K 5.1 4.7 3.9   CL 98 102 106   CO2 26 27 27   BUN 44* 52* 49*   * 146* 87     No results for input(s): ALB in the last 72 hours.    Invalid input(s): ALKP, SGOT, SGPT, TBIL, DBIL, TPRO  Recent Labs     09/16/19 2250   INR 0.9         Radiology Review:  CT Abdomen Pelvis  Without Contrast   Final Result      X-Ray Chest AP Portable   Final Result            IMPRESSION / RECOMMENDATIONS:  74 y.o. female with PMHx cholecystectomy, CAD / CHF (ASA), HTN, HLD, DOM, COPD, OA, colon ca s/p partial colectomy presents for evaluation of acute / chronic diarrhea. Unclear etiology, and will evaluate w/ colonoscopy w/ TI intubation and colon bx. Check pancreatic elastase (enzymes currently under dx'd for wt), stool PCR and colon biopsies.     -Check elastase  and stool PCR  -Avoid Mobic / NSAIDs for now  -Colonoscopy w/ bx tomorrow  -If w/u negative, will try questran as patient states happened near time of her cholecystectomy    Thank you for this consult.    Biju Kramer III  9/18/2019  3:41 PM

## 2019-09-18 NOTE — PLAN OF CARE
Problem: Fall Injury Risk  Goal: Absence of Fall and Fall-Related Injury  Bed alarm on. Safety maintained.

## 2019-09-18 NOTE — HOSPITAL COURSE
Patient was admitted to the hospital for management of acute pancreatitis.  Antiemetics and as needed pain medications was ordered.  Her diet was slowly advanced, no recurrent nausea or emesis.  However she continued with cramping abdominal pain associated with multiple large volume diarrhea, acute on chronic.  She was seen by therapy with recommendations for home health and patient was accepting of same.  Seen by wound care, stage I sacral decubitus with dry lower extremity, recommendations noted. CT abd/pelvis with acute pathology. Pending MRA abdomen.

## 2019-09-18 NOTE — PLAN OF CARE
09/18/19 0732   Patient Assessment/Suction   Level of Consciousness (AVPU) alert   Respiratory Effort Unlabored;Normal   Expansion/Accessory Muscles/Retractions no use of accessory muscles;no retractions   All Lung Fields Breath Sounds diminished   Rhythm/Pattern, Respiratory pattern regular   Cough Frequency infrequent   Cough Type good   PRE-TX-O2   O2 Device (Oxygen Therapy) nasal cannula   $ Is the patient on Low Flow Oxygen? Yes   Flow (L/min) 2   SpO2 98 %   Pulse Oximetry Type Intermittent   $ Pulse Oximetry - Multiple Charge Pulse Oximetry - Multiple   Pulse 70   Resp 18   Aerosol Therapy   $ Aerosol Therapy Charges Aerosol Treatment   Daily Review of Necessity (SVN) completed   Respiratory Treatment Status (SVN) given   Treatment Route (SVN) mask   Patient Position (SVN) semi-Solorzano's   Post Treatment Assessment (SVN) increased aeration   Signs of Intolerance (SVN) none   Inhaler   $ Inhaler Charges MDI (Metered Dose Inahler) Treatment;Mouth rinsed post treatment   Daily Review of Necessity (Inhaler) completed   Respiratory Treatment Status (Inhaler) given   Treatment Route (Inhaler) mouthpiece   Patient Position (Inhaler) semi-Solorzano's   Post Treatment Assessment (Inhaler) increased aeration   Signs of Intolerance (Inhaler) none   Breath Sounds Post-Respiratory Treatment   Throughout All Fields Post-Treatment All Fields   Throughout All Fields Post-Treatment aeration increased   Post-treatment Heart Rate (beats/min) 82   Post-treatment Resp Rate (breaths/min) 19

## 2019-09-19 PROBLEM — R07.9 CHEST PAIN: Status: RESOLVED | Noted: 2019-09-17 | Resolved: 2019-09-19

## 2019-09-19 PROBLEM — R79.89 ABNORMAL THYROID BLOOD TEST: Status: ACTIVE | Noted: 2019-09-19

## 2019-09-19 PROBLEM — E83.39 HYPOPHOSPHATEMIA: Status: ACTIVE | Noted: 2019-09-19

## 2019-09-19 LAB
ANION GAP SERPL CALC-SCNC: 9 MMOL/L (ref 8–16)
ANION GAP SERPL CALC-SCNC: 9 MMOL/L (ref 8–16)
BACTERIA STL CULT: NORMAL
BACTERIA STL CULT: NORMAL
BUN SERPL-MCNC: 36 MG/DL (ref 8–23)
BUN SERPL-MCNC: 36 MG/DL (ref 8–23)
CALCIUM SERPL-MCNC: 8.4 MG/DL (ref 8.7–10.5)
CALCIUM SERPL-MCNC: 8.4 MG/DL (ref 8.7–10.5)
CHLORIDE SERPL-SCNC: 107 MMOL/L (ref 95–110)
CHLORIDE SERPL-SCNC: 107 MMOL/L (ref 95–110)
CO2 SERPL-SCNC: 25 MMOL/L (ref 23–29)
CO2 SERPL-SCNC: 25 MMOL/L (ref 23–29)
CREAT SERPL-MCNC: 1.3 MG/DL (ref 0.5–1.4)
CREAT SERPL-MCNC: 1.3 MG/DL (ref 0.5–1.4)
ERYTHROCYTE [DISTWIDTH] IN BLOOD BY AUTOMATED COUNT: 13.1 % (ref 11.5–14.5)
ERYTHROCYTE [DISTWIDTH] IN BLOOD BY AUTOMATED COUNT: 13.1 % (ref 11.5–14.5)
EST. GFR  (AFRICAN AMERICAN): 47.4 ML/MIN/1.73 M^2
EST. GFR  (AFRICAN AMERICAN): 47.4 ML/MIN/1.73 M^2
EST. GFR  (NON AFRICAN AMERICAN): 41.1 ML/MIN/1.73 M^2
EST. GFR  (NON AFRICAN AMERICAN): 41.1 ML/MIN/1.73 M^2
GLUCOSE SERPL-MCNC: 107 MG/DL (ref 70–110)
GLUCOSE SERPL-MCNC: 114 MG/DL (ref 70–110)
GLUCOSE SERPL-MCNC: 117 MG/DL (ref 70–110)
GLUCOSE SERPL-MCNC: 117 MG/DL (ref 70–110)
GLUCOSE SERPL-MCNC: 125 MG/DL (ref 70–110)
GLUCOSE SERPL-MCNC: 94 MG/DL (ref 70–110)
HCT VFR BLD AUTO: 37.8 % (ref 37–48.5)
HCT VFR BLD AUTO: 37.8 % (ref 37–48.5)
HGB BLD-MCNC: 11.6 G/DL (ref 12–16)
HGB BLD-MCNC: 11.6 G/DL (ref 12–16)
LIPASE SERPL-CCNC: 40 U/L (ref 4–60)
MAGNESIUM SERPL-MCNC: 1.6 MG/DL (ref 1.6–2.6)
MAGNESIUM SERPL-MCNC: 1.6 MG/DL (ref 1.6–2.6)
MCH RBC QN AUTO: 28.2 PG (ref 27–31)
MCH RBC QN AUTO: 28.2 PG (ref 27–31)
MCHC RBC AUTO-ENTMCNC: 30.7 G/DL (ref 32–36)
MCHC RBC AUTO-ENTMCNC: 30.7 G/DL (ref 32–36)
MCV RBC AUTO: 92 FL (ref 82–98)
MCV RBC AUTO: 92 FL (ref 82–98)
PHOSPHATE SERPL-MCNC: 2.6 MG/DL (ref 2.7–4.5)
PHOSPHATE SERPL-MCNC: 2.6 MG/DL (ref 2.7–4.5)
PLATELET # BLD AUTO: 134 K/UL (ref 150–350)
PLATELET # BLD AUTO: 134 K/UL (ref 150–350)
PMV BLD AUTO: 12.3 FL (ref 9.2–12.9)
PMV BLD AUTO: 12.3 FL (ref 9.2–12.9)
POTASSIUM SERPL-SCNC: 3.6 MMOL/L (ref 3.5–5.1)
POTASSIUM SERPL-SCNC: 3.6 MMOL/L (ref 3.5–5.1)
RBC # BLD AUTO: 4.11 M/UL (ref 4–5.4)
RBC # BLD AUTO: 4.11 M/UL (ref 4–5.4)
SODIUM SERPL-SCNC: 141 MMOL/L (ref 136–145)
SODIUM SERPL-SCNC: 141 MMOL/L (ref 136–145)
WBC # BLD AUTO: 9.71 K/UL (ref 3.9–12.7)
WBC # BLD AUTO: 9.71 K/UL (ref 3.9–12.7)

## 2019-09-19 PROCEDURE — 25000242 PHARM REV CODE 250 ALT 637 W/ HCPCS: Performed by: INTERNAL MEDICINE

## 2019-09-19 PROCEDURE — 99900035 HC TECH TIME PER 15 MIN (STAT)

## 2019-09-19 PROCEDURE — 88305 TISSUE EXAM BY PATHOLOGIST: CPT | Mod: TC,59

## 2019-09-19 PROCEDURE — 94640 AIRWAY INHALATION TREATMENT: CPT

## 2019-09-19 PROCEDURE — 12000002 HC ACUTE/MED SURGE SEMI-PRIVATE ROOM

## 2019-09-19 PROCEDURE — 27200043 HC FORCEPS, BIOPSY: Performed by: INTERNAL MEDICINE

## 2019-09-19 PROCEDURE — 36415 COLL VENOUS BLD VENIPUNCTURE: CPT

## 2019-09-19 PROCEDURE — 97110 THERAPEUTIC EXERCISES: CPT

## 2019-09-19 PROCEDURE — 27000221 HC OXYGEN, UP TO 24 HOURS

## 2019-09-19 PROCEDURE — 83690 ASSAY OF LIPASE: CPT

## 2019-09-19 PROCEDURE — 80048 BASIC METABOLIC PNL TOTAL CA: CPT

## 2019-09-19 PROCEDURE — 45380 COLONOSCOPY AND BIOPSY: CPT | Performed by: INTERNAL MEDICINE

## 2019-09-19 PROCEDURE — 25000003 PHARM REV CODE 250: Performed by: INTERNAL MEDICINE

## 2019-09-19 PROCEDURE — 94761 N-INVAS EAR/PLS OXIMETRY MLT: CPT

## 2019-09-19 PROCEDURE — 63600175 PHARM REV CODE 636 W HCPCS: Performed by: INTERNAL MEDICINE

## 2019-09-19 PROCEDURE — 97530 THERAPEUTIC ACTIVITIES: CPT

## 2019-09-19 PROCEDURE — 84100 ASSAY OF PHOSPHORUS: CPT

## 2019-09-19 PROCEDURE — 83735 ASSAY OF MAGNESIUM: CPT

## 2019-09-19 PROCEDURE — 85027 COMPLETE CBC AUTOMATED: CPT

## 2019-09-19 PROCEDURE — 82962 GLUCOSE BLOOD TEST: CPT

## 2019-09-19 RX ORDER — CHOLESTYRAMINE 4 G/4.8G
4 POWDER, FOR SUSPENSION ORAL 3 TIMES DAILY
Status: DISCONTINUED | OUTPATIENT
Start: 2019-09-19 | End: 2019-09-25 | Stop reason: HOSPADM

## 2019-09-19 RX ORDER — ENOXAPARIN SODIUM 100 MG/ML
40 INJECTION SUBCUTANEOUS EVERY 24 HOURS
Status: DISCONTINUED | OUTPATIENT
Start: 2019-09-19 | End: 2019-09-20

## 2019-09-19 RX ORDER — SODIUM,POTASSIUM PHOSPHATES 280-250MG
1 POWDER IN PACKET (EA) ORAL
Status: COMPLETED | OUTPATIENT
Start: 2019-09-19 | End: 2019-09-20

## 2019-09-19 RX ADMIN — POTASSIUM & SODIUM PHOSPHATES POWDER PACK 280-160-250 MG 1 PACKET: 280-160-250 PACK at 05:09

## 2019-09-19 RX ADMIN — METOPROLOL SUCCINATE 50 MG: 50 TABLET, FILM COATED, EXTENDED RELEASE ORAL at 09:09

## 2019-09-19 RX ADMIN — ENOXAPARIN SODIUM 40 MG: 100 INJECTION SUBCUTANEOUS at 05:09

## 2019-09-19 RX ADMIN — CHOLESTYRAMINE 4 G: 4 POWDER, FOR SUSPENSION ORAL at 02:09

## 2019-09-19 RX ADMIN — ASPIRIN 81 MG: 81 TABLET, COATED ORAL at 01:09

## 2019-09-19 RX ADMIN — PANTOPRAZOLE SODIUM 20 MG: 20 TABLET, DELAYED RELEASE ORAL at 04:09

## 2019-09-19 RX ADMIN — PANCRELIPASE 6 CAPSULE: 30000; 6000; 19000 CAPSULE, DELAYED RELEASE PELLETS ORAL at 05:09

## 2019-09-19 RX ADMIN — PANCRELIPASE 6 CAPSULE: 30000; 6000; 19000 CAPSULE, DELAYED RELEASE PELLETS ORAL at 07:09

## 2019-09-19 RX ADMIN — POTASSIUM & SODIUM PHOSPHATES POWDER PACK 280-160-250 MG 1 PACKET: 280-160-250 PACK at 09:09

## 2019-09-19 RX ADMIN — PANCRELIPASE 6 CAPSULE: 30000; 6000; 19000 CAPSULE, DELAYED RELEASE PELLETS ORAL at 01:09

## 2019-09-19 RX ADMIN — Medication: at 09:09

## 2019-09-19 RX ADMIN — FLUTICASONE FUROATE AND VILANTEROL TRIFENATATE 1 PUFF: 100; 25 POWDER RESPIRATORY (INHALATION) at 09:09

## 2019-09-19 RX ADMIN — ATORVASTATIN CALCIUM 10 MG: 10 TABLET, FILM COATED ORAL at 09:09

## 2019-09-19 RX ADMIN — CHOLESTYRAMINE 4 G: 4 POWDER, FOR SUSPENSION ORAL at 09:09

## 2019-09-19 RX ADMIN — IPRATROPIUM BROMIDE AND ALBUTEROL SULFATE 3 ML: .5; 3 SOLUTION RESPIRATORY (INHALATION) at 07:09

## 2019-09-19 RX ADMIN — CASTOR OIL AND BALSAM, PERU 1 APPLICATION: 788; 87 OINTMENT TOPICAL at 09:09

## 2019-09-19 NOTE — PT/OT/SLP PROGRESS
Occupational Therapy   Treatment    Name: Marisol Kerr  MRN: [id number]  Admitting Diagnosis:  Diarrhea  Day of Surgery    Recommendations:     Discharge Recommendations: home with home health  Discharge Equipment Recommendations:  none  Barriers to discharge:       Assessment:     Marisol Kerr is a 74 y.o. female with a medical diagnosis of Diarrhea.  Pt agreeable to OT therapy session this AM. Performance deficits affecting function are weakness, impaired endurance, impaired self care skills, impaired balance, gait instability, impaired functional mobilty, decreased safety awareness. Pt refused OOB activity due to being too fatigued from just working with PT.    Rehab Prognosis:  Good; patient would benefit from acute skilled OT services to address these deficits and reach maximum level of function.       Plan:     Patient to be seen 5 x/week to address the above listed problems via self-care/home management, therapeutic activities, therapeutic exercises  · Plan of Care Expires: 10/18/19  · Plan of Care Reviewed with: patient    Subjective     Pain/Comfort:  ·      Objective:     Communicated with: nursing prior to session.  Patient found HOB elevated with telemetry, oxygen, pressure relief boots upon OT entry to room.    General Precautions: Standard, fall   Orthopedic Precautions:N/A   Braces:         Treatment & Education:  UE strengthening exercises with yellow theraband 2X10 reps bilaterally-horizontal abd/add, shoulder flex/abd; Ball squeezes 2X10 reps B hands focusing on  strengthening.  Pt educated on importance of out of bed ADL activity to negate effects of prolonged bed rest.       Patient left HOB elevated with all lines intact and call button in reachEducation:      GOALS:   Multidisciplinary Problems     Occupational Therapy Goals        Problem: Occupational Therapy Goal    Goal Priority Disciplines Outcome Interventions   Occupational Therapy Goal     OT, PT/OT Ongoing (interventions implemented  as appropriate)    Description:  Goals to be met by: discharge    Patient will increase functional independence with ADLs by performing:    UE Dressing with Supervision.  LE Dressing with Supervision.  Grooming while standing at sink with Supervision.  Toileting from bedside commode with Supervision for hygiene and clothing management.   Toilet transfer to bedside commode with Supervision.                      Time Tracking:     OT Date of Treatment: 09/19/19  OT Start Time: 1100  OT Stop Time: 1120  OT Total Time (min): 20 min    Billable Minutes:Therapeutic Exercise 20    Nani Lea OT  9/19/2019

## 2019-09-19 NOTE — NURSING
Returned from Endo AAOx4 with daughter present, new medication ordered, resumed diet, and medication. No c/o voiced at this time.

## 2019-09-19 NOTE — NURSING
Anesthia at bedside talking with patient. Daughter at bedside also. NAD NPO maintained for procedure

## 2019-09-19 NOTE — PROGRESS NOTES
Pharmacist Renal Dose Adjustment Note    Marisol Kerr is a 74 y.o. female being treated with the medication Lovenox.    Patient Data:    Vital Signs (Most Recent):  Temp: 98.3 °F (36.8 °C) (09/19/19 0743)  Pulse: 70 (09/19/19 0743)  Resp: 19 (09/19/19 0743)  BP: 121/70 (09/19/19 0743)  SpO2: 95 % (09/19/19 0743) Vital Signs (72h Range):  Temp:  [97.6 °F (36.4 °C)-99 °F (37.2 °C)]   Pulse:  []   Resp:  [16-25]   BP: (101-160)/(46-91)   SpO2:  [93 %-100 %]      Recent Labs   Lab 09/17/19  0416 09/18/19  0454 09/19/19  0532   CREATININE 2.4* 1.8* 1.3  1.3     Serum creatinine: 1.3 mg/dL 09/19/19 0532  Estimated creatinine clearance: 38.3 mL/min (CrCl > 30 mL/min)  BMI < 40    Lovenox 30 mg every 24 hours will be changed to Lovenox 40 mg every 24 hours per pharmacy renal dose adjustment protocol.    Pharmacist's Name: Sanjuana Paredes  Pharmacist's Extension: [phone]

## 2019-09-19 NOTE — PLAN OF CARE
Problem: Occupational Therapy Goal  Goal: Occupational Therapy Goal  Goals to be met by: discharge    Patient will increase functional independence with ADLs by performing:    UE Dressing with Supervision.  LE Dressing with Supervision.  Grooming while standing at sink with Supervision.  Toileting from bedside commode with Supervision for hygiene and clothing management.   Toilet transfer to bedside commode with Supervision.     Outcome: Ongoing (interventions implemented as appropriate)  Nani Lea OT  09/19/2019

## 2019-09-19 NOTE — [employer] PATIENT INSTRUCTIONS
Discharge Summary/Instructions after an Endoscopic Procedure  Patient Name: Marisol Kerr  Patient MRN: [id number]  Patient YOB: 1945 Thursday, September 19, 2019  Biju Kramer III, MD  RESTRICTIONS:  During your procedure today, you received medications for sedation.  These   medications may affect your judgment, balance and coordination.  Therefore,   for 24 hours, you have the following restrictions:   - DO NOT drive a car, operate machinery, make legal/financial decisions,   sign important papers or drink alcohol.    ACTIVITY:  Today: no heavy lifting, straining or running due to procedural   sedation/anesthesia.  The following day: return to full activity including work.  DIET:  Eat and drink normally unless instructed otherwise.     TREATMENT FOR COMMON SIDE EFFECTS:  - Mild abdominal pain, nausea, belching, bloating or excessive gas:  rest,   eat lightly and use a heating pad.  - Sore Throat: treat with throat lozenges and/or gargle with warm salt   water.  - Because air was used during the procedure, expelling large amounts of air   from your rectum or belching is normal.  - If a bowel prep was taken, you may not have a bowel movement for 1-3 days.    This is normal.  SYMPTOMS TO WATCH FOR AND REPORT TO YOUR PHYSICIAN:  1. Abdominal pain or bloating, other than gas cramps.  2. Chest pain.  3. Back pain.  4. Signs of infection such as: chills or fever occurring within 24 hours   after the procedure.  5. Rectal bleeding, which would show as bright red, maroon, or black stools.   (A tablespoon of blood from the rectum is not serious, especially if   hemorrhoids are present.)  6. Vomiting.  7. Weakness or dizziness.  GO DIRECTLY TO THE NEAREST EMERGENCY ROOM IF YOU HAVE ANY OF THE FOLLOWING:      Difficulty breathing              Chills and/or fever over 101 F   Persistent vomiting and/or vomiting blood   Severe abdominal pain   Severe chest pain   Black, tarry stools   Bleeding- more than one  tablespoon   Any other symptom or condition that you feel may need urgent attention  Your doctor recommends these additional instructions:  If any biopsies were taken, your doctors clinic will contact you in 1 to 2   weeks with any results.  - Start questran TID, f/u elastase / PCR, avoid NSAIDs, and f/u in 1 week w/   [place] in clinic w/ path results  - Return patient to [facility] for ongoing care.  For questions, problems or results please call your physician - Biju Kramer III, MD at Work:  [phone].  [facility], EMERGENCY ROOM PHONE NUMBER: [phone]  IF A COMPLICATION OR EMERGENCY SITUATION ARISES AND YOU ARE UNABLE TO REACH   YOUR PHYSICIAN - GO DIRECTLY TO THE EMERGENCY ROOM.  Biju Kramer III, MD  9/19/2019 1:28:05 PM  This report has been verified and signed electronically.  [employer]

## 2019-09-19 NOTE — PT/OT/SLP PROGRESS
Physical Therapy Treatment    Patient Name:  Marisol Kerr   MRN:  [id number]    Recommendations:     Discharge Recommendations:  home health PT   Discharge Equipment Recommendations: none   Barriers to discharge: None    Assessment:     Marisol Kerr is a 74 y.o. female admitted with a medical diagnosis of Diarrhea.  She presents with the following impairments/functional limitations:  weakness, impaired endurance, impaired self care skills, impaired functional mobilty, gait instability, impaired balance, decreased safety awareness, pain. Patient tolerated exercise and activity better with no diarrhea. She had increased bed mobility except for ROM in LLE due to hip pain from arthritis per patient. Continue with PT's POC. Home with home health PT. Patient initially refused ambulation due to having a procedure but when having trouble scooting along EOB for positioning she decided to stand up and walk to the [facility].    Rehab Prognosis: Good; patient would benefit from acute skilled PT services to address these deficits and reach maximum level of function.    Recent Surgery: Procedure(s) (LRB):  SIGMOIDOSCOPY, FLEXIBLE (N/A) Day of Surgery    Plan:     During this hospitalization, patient to be seen 5 x/week to address the identified rehab impairments via gait training, therapeutic activities, therapeutic exercises and progress toward the following goals:    · Plan of Care Expires:  10/17/19    Subjective     Chief Complaint: none today  Patient/Family Comments/goals: to go home  Pain/Comfort:  · Pain Rating 1: 0/10  · Pain Rating Post-Intervention 1: 0/10      Objective:     Communicated with OFELIA Gamez prior to session.  Patient found HOB elevated with telemetry, pressure relief boots, oxygen upon PT entry to room.     General Precautions: Standard, fall   Orthopedic Precautions:N/A   Braces: N/A     Functional Mobility:  · Bed Mobility:     · Rolling Left:  independence  · Rolling Right: independence  · Scooting: minimum  assistance and moderate assistance  · Bridging: modified independence  · Supine to Sit: minimum assistance  · Sit to Supine: minimum assistance  · Transfers:     · Sit to Stand:  contact guard assistance with rolling walker  · Gait: 5' along EOB side ways.       AM-PAC 6 CLICK MOBILITY          Therapeutic Activities and Exercises:  Supine exercises to increase lower extremity strength to decrease fall risk for return to PLOF: Ankle pumps, Heel slides, Hip Ab/ADD, Qs, Gs, SLR, Bridges x 10.  Patient sat on EOB for spinal orientation and to increase proprioception in sitting.Sitting exercises for lower extremity strengthening to increase standing tolerance and decrease fall risk: Ankle pumps, LAQ, Heel slides, Marching, Partial sit to stand x 10.    Patient left HOB elevated with all lines intact, call button in reach and RN aware..    GOALS:   Multidisciplinary Problems     Physical Therapy Goals        Problem: Physical Therapy Goal    Goal Priority Disciplines Outcome Goal Variances Interventions   Physical Therapy Goal     PT, PT/OT Ongoing (interventions implemented as appropriate)     Description:  Goals to be met by: discharge     Patient will increase functional independence with mobility by performin. Supine to sit with Stand-by Assistance  2. Sit to stand transfer with Stand-by Assistance  3. Bed to chair transfer with Stand-by Assistance using Rolling Walker  4. Gait  x 125 feet with Stand-by Assistance using Rolling Walker.                       Time Tracking:     PT Received On: 19  PT Start Time: 1035     PT Stop Time: 1100  PT Total Time (min): 25 min     Billable Minutes: Therapeutic Activity 10 and Therapeutic Exercise 15    Treatment Type: Treatment  PT/PTA: PTA     PTA Visit Number: 2     Ronda Clint, PTA  2019

## 2019-09-19 NOTE — ASSESSMENT & PLAN NOTE
Reports chronic diarrhea about 1 year duration.  History of colon cancer and cholecystectomy.  Acute interval worsening with cramping abdominal pain.  CT abdomen and pelvis reviewed.  Stool studies negative including C diff.  Medications reviewed-no definite inciting drugs, though was started on Mobic recently-not taking  Status post colonoscopy 9/19 as outlined.  Continue Imodium q.i.d.-home medication  Increased Questran dosing.  GI consulted, recommended stool elastase/PCR, voiding NSAID, random biopsies sent will follow up

## 2019-09-19 NOTE — PLAN OF CARE
09/19/19 0715   Patient Assessment/Suction   Level of Consciousness (AVPU) alert   Respiratory Effort Unlabored;Normal   Expansion/Accessory Muscles/Retractions no use of accessory muscles;no retractions   All Lung Fields Breath Sounds clear   Rhythm/Pattern, Respiratory unlabored   Cough Frequency infrequent   Cough Type good   PRE-TX-O2   O2 Device (Oxygen Therapy) nasal cannula   $ Is the patient on Low Flow Oxygen? Yes   Flow (L/min) 2   SpO2 97 %   Pulse Oximetry Type Intermittent   $ Pulse Oximetry - Multiple Charge Pulse Oximetry - Multiple   Pulse 76   Resp 18   Aerosol Therapy   $ Aerosol Therapy Charges Refused   Inhaler   $ Inhaler Charges MDI (Metered Dose Inahler) Treatment;Mouth rinsed post treatment   Daily Review of Necessity (Inhaler) completed   Respiratory Treatment Status (Inhaler) given   Treatment Route (Inhaler) mouthpiece   Patient Position (Inhaler) semi-Solorzano's   Post Treatment Assessment (Inhaler) increased aeration   Signs of Intolerance (Inhaler) none   Breath Sounds Post-Respiratory Treatment   Throughout All Fields Post-Treatment All Fields   Throughout All Fields Post-Treatment aeration increased   Post-treatment Heart Rate (beats/min) 78   Post-treatment Resp Rate (breaths/min) 18

## 2019-09-19 NOTE — PLAN OF CARE
Problem: Fall Injury Risk  Goal: Absence of Fall and Fall-Related Injury  Outcome: Ongoing (interventions implemented as appropriate)  Patient will use safety measures and injury free      Problem: Adult Inpatient Plan of Care  Goal: Plan of Care Review  Patient will have effective coping mechinism      Problem: Skin Injury Risk Increased  Goal: Skin Health and Integrity  Outcome: Ongoing (interventions implemented as appropriate)  Keep skin dry and re-assess frequently.  Keep hydrated well.

## 2019-09-19 NOTE — SUBJECTIVE & OBJECTIVE
Interval History:  Patient seen post colonoscopy, severe colitis cecum and colon, random biopsies sent, GI increased question and will arrange outpatient follow-up with patient's gastroenterologist Dr. Alvarez.  Patient denies any other new complaints.  Excoriation/erythema of gluteal region.    Review of Systems   Constitutional: Negative for chills and fever.   Respiratory: Negative for cough and shortness of breath.    Cardiovascular: Negative for chest pain.   Gastrointestinal: Positive for diarrhea. Negative for abdominal pain, nausea and vomiting.   Skin: Positive for wound.     Objective:     Vital Signs (Most Recent):  Temp: 98.4 °F (36.9 °C) (09/19/19 1258)  Pulse: 66 (09/19/19 1325)  Resp: 20 (09/19/19 1325)  BP: (!) 142/47 (09/19/19 1325)  SpO2: 99 %(2 lnc) (09/19/19 1325) Vital Signs (24h Range):  Temp:  [98 °F (36.7 °C)-98.8 °F (37.1 °C)] 98.4 °F (36.9 °C)  Pulse:  [63-80] 66  Resp:  [18-24] 20  SpO2:  [94 %-100 %] 99 %  BP: (120-176)/(40-89) 142/47     Weight: 87 kg (191 lb 12.8 oz)  Body mass index is 37.46 kg/m².    Intake/Output Summary (Last 24 hours) at 9/19/2019 1458  Last data filed at 9/19/2019 1000  Gross per 24 hour   Intake --   Output 4 ml   Net -4 ml      Physical Exam   Constitutional: She is oriented to person, place, and time. She appears well-developed and well-nourished. No distress.   Obese female patient, sitting in the bed, eating lunch, comfortable, calm   HENT:   Head: Normocephalic and atraumatic.   Eyes: Right eye exhibits no discharge. Left eye exhibits no discharge.   Neck: Normal range of motion.   Cardiovascular: Regular rhythm.   Pulmonary/Chest: No stridor. No respiratory distress. She has no wheezes.   On supplemental oxygen via nasal cannula   Abdominal: Soft. There is no rebound and no guarding.   Positive large abdominal hernia, abdominal tenderness to deep palpation, hyperactive bowel sounds   Musculoskeletal:   Diffuse osteoarthritis changes   Neurological: She is  alert and oriented to person, place, and time.   Moving all 4 extremities, hard of hearing   Skin: She is not diaphoretic.   Chronic venous stasis changes with mild erythema bilateral lower extremity with dry skin and edema   Nursing note and vitals reviewed.      Significant Labs:   BMP:   Recent Labs   Lab 09/19/19  0532   *  117*     141   K 3.6  3.6     107   CO2 25  25   BUN 36*  36*   CREATININE 1.3  1.3   CALCIUM 8.4*  8.4*   MG 1.6  1.6     CBC:   Recent Labs   Lab 09/18/19  0454 09/19/19  0532   WBC 5.53 9.71  9.71   HGB 11.4* 11.6*  11.6*   HCT 36.1* 37.8  37.8   * 134*  134*     Magnesium:   Recent Labs   Lab 09/18/19  0454 09/19/19  0532   MG 1.6 1.6  1.6     All pertinent labs within the past 24 hours have been reviewed.    Significant Imaging: I have reviewed and interpreted all pertinent imaging results/findings within the past 24 hours.

## 2019-09-19 NOTE — NURSING
Pt consumed complete colonoscopy prep.  Stool liquid, semi-clear with small amount of food debris noted.  calmoseptine applied to excoriated buttocks.  Pt aware of NPO status. Will continue to monitor.

## 2019-09-19 NOTE — PROGRESS NOTES
[facility] Medicine  Progress Note    Patient Name: Marisol Kerr  MRN: [id number]  Patient Class: IP- Inpatient   Admission Date: 9/16/2019  Length of Stay: 2 days  Attending Physician: Maci Glover MD  Primary Care Provider: Khadar Dwyer MD        Subjective:     Principal Problem:Diarrhea        HPI:  74-year-old female history of COPD on 3 L oxygen or home, hypertension, hyperlipidemia, IDDM, S/P cholecystectomy, history colon cancer previously on chemotherapy who lives at home alone comes in for abdominal pain, nausea and vomiting, and diarrhea. Patient reports that since January after she had her last colonoscopy she is having chronic diarrhea that she improves with Imodium. Today she was in usual health about 8 PM the day before admission when she suddenly developed nausea with nonbloody nonbilious emesis while she was in the phone with a friend. Reports having 2 episodes of emesis. Patient then attempted to walk to the bathroom but had an episode of diarrhea before she reached the bathroom. [place] and had another several episodes of nausea and vomiting associated with epigastric/chest pain. Describes chest pain as sharp radiating to her back type of pain. Denies any shortness of breath, diaphoresis. Patient's friend became concerned and brought patient to the ED for further evaluation. Reports drinking alcohol on holidays and has not had a drink in several months. In the ED, patient was found to have a lipase of 200. Initial troponin was negative and EKG was unremarkable.    Overview/Hospital Course:  Patient was admitted to the hospital for management of acute pancreatitis.  Antiemetics and as needed pain medications was ordered.  Her diet was slowly advanced, no recurrent nausea or emesis.  However she continued with cramping abdominal pain associated with multiple large volume diarrhea, acute on chronic.  She was seen by therapy with recommendations for  home health and patient was accepting of same.  Seen by wound care, stage I sacral decubitus with dry lower extremity, recommendations noted. Plans for colonoscopy 9/19.    Interval History:  Patient seen post colonoscopy, severe colitis cecum and colon, random biopsies sent, GI increased question and will arrange outpatient follow-up with patient's gastroenterologist Dr. Alvarez.  Patient denies any other new complaints.  Excoriation/erythema of gluteal region.    Review of Systems   Constitutional: Negative for chills and fever.   Respiratory: Negative for cough and shortness of breath.    Cardiovascular: Negative for chest pain.   Gastrointestinal: Positive for diarrhea. Negative for abdominal pain, nausea and vomiting.   Skin: Positive for wound.     Objective:     Vital Signs (Most Recent):  Temp: 98.4 °F (36.9 °C) (09/19/19 1258)  Pulse: 66 (09/19/19 1325)  Resp: 20 (09/19/19 1325)  BP: (!) 142/47 (09/19/19 1325)  SpO2: 99 %(2 lnc) (09/19/19 1325) Vital Signs (24h Range):  Temp:  [98 °F (36.7 °C)-98.8 °F (37.1 °C)] 98.4 °F (36.9 °C)  Pulse:  [63-80] 66  Resp:  [18-24] 20  SpO2:  [94 %-100 %] 99 %  BP: (120-176)/(40-89) 142/47     Weight: 87 kg (191 lb 12.8 oz)  Body mass index is 37.46 kg/m².    Intake/Output Summary (Last 24 hours) at 9/19/2019 4638  Last data filed at 9/19/2019 1000  Gross per 24 hour   Intake --   Output 4 ml   Net -4 ml      Physical Exam   Constitutional: She is oriented to person, place, and time. She appears well-developed and well-nourished. No distress.   Obese female patient, sitting in the bed, eating lunch, comfortable, calm   HENT:   Head: Normocephalic and atraumatic.   Eyes: Right eye exhibits no discharge. Left eye exhibits no discharge.   Neck: Normal range of motion.   Cardiovascular: Regular rhythm.   Pulmonary/Chest: No stridor. No respiratory distress. She has no wheezes.   On supplemental oxygen via nasal cannula   Abdominal: Soft. There is no rebound and no guarding.    Positive large abdominal hernia, abdominal tenderness to deep palpation, hyperactive bowel sounds   Musculoskeletal:   Diffuse osteoarthritis changes   Neurological: She is alert and oriented to person, place, and time.   Moving all 4 extremities, hard of hearing   Skin: She is not diaphoretic.   Chronic venous stasis changes with mild erythema bilateral lower extremity with dry skin and edema   Nursing note and vitals reviewed.      Significant Labs:   BMP:   Recent Labs   Lab 09/19/19  0532   *  117*     141   K 3.6  3.6     107   CO2 25  25   BUN 36*  36*   CREATININE 1.3  1.3   CALCIUM 8.4*  8.4*   MG 1.6  1.6     CBC:   Recent Labs   Lab 09/18/19  0454 09/19/19  0532   WBC 5.53 9.71  9.71   HGB 11.4* 11.6*  11.6*   HCT 36.1* 37.8  37.8   * 134*  134*     Magnesium:   Recent Labs   Lab 09/18/19  0454 09/19/19  0532   MG 1.6 1.6  1.6     All pertinent labs within the past 24 hours have been reviewed.    Significant Imaging: I have reviewed and interpreted all pertinent imaging results/findings within the past 24 hours.      Assessment/Plan:      * Acute on chronic diarrhea  Reports chronic diarrhea about 1 year duration.  History of colon cancer and cholecystectomy.  Acute interval worsening with cramping abdominal pain.  CT abdomen and pelvis reviewed.  Stool studies negative including C diff.  Medications reviewed-no definite inciting drugs, though was started on Mobic recently-not taking  Status post colonoscopy 9/19 as outlined.  Continue Imodium q.i.d.-home medication  Increased Questran dosing.  GI consulted, recommended stool elastase/PCR, voiding NSAID, random biopsies sent will follow up    Hypophosphatemia  Replete and monitor.      Abnormal thyroid blood test  Low TSH with normal free T4.  Outpatient follow-up.      Bilateral nonobstructing nephrolithiasis        Ventral hernia        Decubitus ulcer of sacral region, stage 1  Severe erythema of gluteal and  sacral area.  Seen by wound care this admission.    History of cholecystectomy  Was complicated by retained stone needing ERCP.  Now chronic diarrhea.      Chronic hypoxemic respiratory failure  On 3 L home oxygen.      COPD (chronic obstructive pulmonary disease)  No evidence of acute exacerbation      CKD (chronic kidney disease), stage III  Renally  dose all medications and avoid nephrotoxin drugs.      Essential hypertension, benign  Continue home medications and monitor.        VTE Risk Mitigation (From admission, onward)        Ordered     enoxaparin injection 40 mg  Daily      09/19/19 1040     IP VTE HIGH RISK PATIENT  Once      09/17/19 0237                Maci Glover MD  Department of Hospital Medicine   [facility]

## 2019-09-20 LAB
ADV 40+41 DNA STL QL NAA+NON-PROBE: NOT DETECTED
ANION GAP SERPL CALC-SCNC: 6 MMOL/L (ref 8–16)
ANION GAP SERPL CALC-SCNC: 6 MMOL/L (ref 8–16)
ASTRO TYP 1-8 RNA STL QL NAA+NON-PROBE: NOT DETECTED
BUN SERPL-MCNC: 27 MG/DL (ref 8–23)
BUN SERPL-MCNC: 27 MG/DL (ref 8–23)
C CAYETANENSIS DNA STL QL NAA+NON-PROBE: NOT DETECTED
C COLI+JEJ+UPSA DNA STL QL NAA+NON-PROBE: NOT DETECTED
C DIF TOX TCDA+TCDB STL QL NAA+NON-PROBE: NOT DETECTED
CALCIUM SERPL-MCNC: 8.6 MG/DL (ref 8.7–10.5)
CALCIUM SERPL-MCNC: 8.6 MG/DL (ref 8.7–10.5)
CHLORIDE SERPL-SCNC: 107 MMOL/L (ref 95–110)
CHLORIDE SERPL-SCNC: 107 MMOL/L (ref 95–110)
CO2 SERPL-SCNC: 28 MMOL/L (ref 23–29)
CO2 SERPL-SCNC: 28 MMOL/L (ref 23–29)
CREAT SERPL-MCNC: 1.2 MG/DL (ref 0.5–1.4)
CREAT SERPL-MCNC: 1.2 MG/DL (ref 0.5–1.4)
CRYPTOSP DNA STL QL NAA+NON-PROBE: NOT DETECTED
E COLI O157 DNA STL QL NAA+NON-PROBE: ABNORMAL
E HISTOLYT DNA STL QL NAA+NON-PROBE: NOT DETECTED
EC STX1+STX2 GENES STL QL NAA+NON-PROBE: NOT DETECTED
ENTEROAGGREGATIVE E COLI: NOT DETECTED
ENTEROPATHOGENIC E COLI: NOT DETECTED
ERYTHROCYTE [DISTWIDTH] IN BLOOD BY AUTOMATED COUNT: 13 % (ref 11.5–14.5)
ERYTHROCYTE [DISTWIDTH] IN BLOOD BY AUTOMATED COUNT: 13 % (ref 11.5–14.5)
EST. GFR  (AFRICAN AMERICAN): 52.2 ML/MIN/1.73 M^2
EST. GFR  (AFRICAN AMERICAN): 52.2 ML/MIN/1.73 M^2
EST. GFR  (NON AFRICAN AMERICAN): 45.3 ML/MIN/1.73 M^2
EST. GFR  (NON AFRICAN AMERICAN): 45.3 ML/MIN/1.73 M^2
ETEC LTA+ST1A+ST1B TOX ST NAA+NON-PROBE: NOT DETECTED
G LAMBLIA DNA STL QL NAA+NON-PROBE: NOT DETECTED
GLUCOSE SERPL-MCNC: 103 MG/DL (ref 70–110)
GLUCOSE SERPL-MCNC: 106 MG/DL (ref 70–110)
GLUCOSE SERPL-MCNC: 111 MG/DL (ref 70–110)
GLUCOSE SERPL-MCNC: 97 MG/DL (ref 70–110)
GPP - SALMONELLA: NOT DETECTED
GPP - VIBRIO CHOLERA: NOT DETECTED
GPP - YERSINIA ENTEROCOLITICA: NOT DETECTED
HCT VFR BLD AUTO: 37.6 % (ref 37–48.5)
HCT VFR BLD AUTO: 37.6 % (ref 37–48.5)
HGB BLD-MCNC: 11.7 G/DL (ref 12–16)
HGB BLD-MCNC: 11.7 G/DL (ref 12–16)
LIPASE SERPL-CCNC: 65 U/L (ref 4–60)
MAGNESIUM SERPL-MCNC: 1.5 MG/DL (ref 1.6–2.6)
MAGNESIUM SERPL-MCNC: 1.5 MG/DL (ref 1.6–2.6)
MCH RBC QN AUTO: 28.3 PG (ref 27–31)
MCH RBC QN AUTO: 28.3 PG (ref 27–31)
MCHC RBC AUTO-ENTMCNC: 31.1 G/DL (ref 32–36)
MCHC RBC AUTO-ENTMCNC: 31.1 G/DL (ref 32–36)
MCV RBC AUTO: 91 FL (ref 82–98)
MCV RBC AUTO: 91 FL (ref 82–98)
NOROVIRUS GI+II RNA STL QL NAA+NON-PROBE: DETECTED
PHOSPHATE SERPL-MCNC: 2.8 MG/DL (ref 2.7–4.5)
PHOSPHATE SERPL-MCNC: 2.8 MG/DL (ref 2.7–4.5)
PLATELET # BLD AUTO: 136 K/UL (ref 150–350)
PLATELET # BLD AUTO: 136 K/UL (ref 150–350)
PLESIOMONAS SHIGELLOIDES: NOT DETECTED
PMV BLD AUTO: 12.3 FL (ref 9.2–12.9)
PMV BLD AUTO: 12.3 FL (ref 9.2–12.9)
POTASSIUM SERPL-SCNC: 4.2 MMOL/L (ref 3.5–5.1)
POTASSIUM SERPL-SCNC: 4.2 MMOL/L (ref 3.5–5.1)
RBC # BLD AUTO: 4.14 M/UL (ref 4–5.4)
RBC # BLD AUTO: 4.14 M/UL (ref 4–5.4)
RVA RNA STL QL NAA+NON-PROBE: NOT DETECTED
SAPO I+II+IV+V RNA STL QL NAA+NON-PROBE: NOT DETECTED
SHIGELLA SP+EIEC IPAH STL QL NAA+PROBE: NOT DETECTED
SODIUM SERPL-SCNC: 141 MMOL/L (ref 136–145)
SODIUM SERPL-SCNC: 141 MMOL/L (ref 136–145)
VIBRIO: NOT DETECTED
WBC # BLD AUTO: 7.36 K/UL (ref 3.9–12.7)
WBC # BLD AUTO: 7.36 K/UL (ref 3.9–12.7)

## 2019-09-20 PROCEDURE — 25000003 PHARM REV CODE 250: Performed by: INTERNAL MEDICINE

## 2019-09-20 PROCEDURE — 94761 N-INVAS EAR/PLS OXIMETRY MLT: CPT

## 2019-09-20 PROCEDURE — 84100 ASSAY OF PHOSPHORUS: CPT

## 2019-09-20 PROCEDURE — 83690 ASSAY OF LIPASE: CPT

## 2019-09-20 PROCEDURE — 63600175 PHARM REV CODE 636 W HCPCS: Performed by: INTERNAL MEDICINE

## 2019-09-20 PROCEDURE — 99900035 HC TECH TIME PER 15 MIN (STAT)

## 2019-09-20 PROCEDURE — 27000221 HC OXYGEN, UP TO 24 HOURS

## 2019-09-20 PROCEDURE — 36415 COLL VENOUS BLD VENIPUNCTURE: CPT

## 2019-09-20 PROCEDURE — 97530 THERAPEUTIC ACTIVITIES: CPT

## 2019-09-20 PROCEDURE — 80048 BASIC METABOLIC PNL TOTAL CA: CPT

## 2019-09-20 PROCEDURE — 94640 AIRWAY INHALATION TREATMENT: CPT

## 2019-09-20 PROCEDURE — 25000242 PHARM REV CODE 250 ALT 637 W/ HCPCS: Performed by: INTERNAL MEDICINE

## 2019-09-20 PROCEDURE — 85027 COMPLETE CBC AUTOMATED: CPT

## 2019-09-20 PROCEDURE — 83735 ASSAY OF MAGNESIUM: CPT

## 2019-09-20 PROCEDURE — 97535 SELF CARE MNGMENT TRAINING: CPT

## 2019-09-20 PROCEDURE — 12000002 HC ACUTE/MED SURGE SEMI-PRIVATE ROOM

## 2019-09-20 RX ORDER — ENOXAPARIN SODIUM 100 MG/ML
40 INJECTION SUBCUTANEOUS EVERY 24 HOURS
Status: DISCONTINUED | OUTPATIENT
Start: 2019-09-20 | End: 2019-09-25 | Stop reason: HOSPADM

## 2019-09-20 RX ADMIN — IPRATROPIUM BROMIDE AND ALBUTEROL SULFATE 3 ML: .5; 3 SOLUTION RESPIRATORY (INHALATION) at 01:09

## 2019-09-20 RX ADMIN — PANCRELIPASE 6 CAPSULE: 30000; 6000; 19000 CAPSULE, DELAYED RELEASE PELLETS ORAL at 12:09

## 2019-09-20 RX ADMIN — POTASSIUM & SODIUM PHOSPHATES POWDER PACK 280-160-250 MG 1 PACKET: 280-160-250 PACK at 08:09

## 2019-09-20 RX ADMIN — PANCRELIPASE 6 CAPSULE: 30000; 6000; 19000 CAPSULE, DELAYED RELEASE PELLETS ORAL at 09:09

## 2019-09-20 RX ADMIN — ONDANSETRON 4 MG: 2 INJECTION INTRAMUSCULAR; INTRAVENOUS at 05:09

## 2019-09-20 RX ADMIN — MAGNESIUM OXIDE 800 MG: 400 TABLET ORAL at 12:09

## 2019-09-20 RX ADMIN — CHOLESTYRAMINE 4 G: 4 POWDER, FOR SUSPENSION ORAL at 09:09

## 2019-09-20 RX ADMIN — ASPIRIN 81 MG: 81 TABLET, COATED ORAL at 08:09

## 2019-09-20 RX ADMIN — PANCRELIPASE 6 CAPSULE: 30000; 6000; 19000 CAPSULE, DELAYED RELEASE PELLETS ORAL at 08:09

## 2019-09-20 RX ADMIN — ENOXAPARIN SODIUM 40 MG: 100 INJECTION SUBCUTANEOUS at 05:09

## 2019-09-20 RX ADMIN — CHOLESTYRAMINE 4 G: 4 POWDER, FOR SUSPENSION ORAL at 04:09

## 2019-09-20 RX ADMIN — POTASSIUM & SODIUM PHOSPHATES POWDER PACK 280-160-250 MG 1 PACKET: 280-160-250 PACK at 12:09

## 2019-09-20 RX ADMIN — MAGNESIUM OXIDE 800 MG: 400 TABLET ORAL at 08:09

## 2019-09-20 RX ADMIN — FLUTICASONE FUROATE AND VILANTEROL TRIFENATATE 1 PUFF: 100; 25 POWDER RESPIRATORY (INHALATION) at 07:09

## 2019-09-20 RX ADMIN — PANTOPRAZOLE SODIUM 20 MG: 20 TABLET, DELAYED RELEASE ORAL at 08:09

## 2019-09-20 RX ADMIN — CHOLESTYRAMINE 4 G: 4 POWDER, FOR SUSPENSION ORAL at 08:09

## 2019-09-20 RX ADMIN — PANCRELIPASE 6 CAPSULE: 30000; 6000; 19000 CAPSULE, DELAYED RELEASE PELLETS ORAL at 04:09

## 2019-09-20 RX ADMIN — ATORVASTATIN CALCIUM 10 MG: 10 TABLET, FILM COATED ORAL at 09:09

## 2019-09-20 RX ADMIN — METOPROLOL SUCCINATE 50 MG: 50 TABLET, FILM COATED, EXTENDED RELEASE ORAL at 08:09

## 2019-09-20 RX ADMIN — IPRATROPIUM BROMIDE AND ALBUTEROL SULFATE 3 ML: .5; 3 SOLUTION RESPIRATORY (INHALATION) at 07:09

## 2019-09-20 NOTE — PLAN OF CARE
Problem: Diarrhea  Goal: Fluid and Electrolyte Balance  Outcome: Ongoing (interventions implemented as appropriate)  Give medications as ordered. Monitor electrolytes

## 2019-09-20 NOTE — PLAN OF CARE
09/20/19 0715   Patient Assessment/Suction   Level of Consciousness (AVPU) alert   Respiratory Effort Normal;Unlabored   Expansion/Accessory Muscles/Retractions no retractions;no use of accessory muscles   All Lung Fields Breath Sounds diminished   Cough Type good;nonproductive   PRE-TX-O2   O2 Device (Oxygen Therapy) nasal cannula   $ Is the patient on Low Flow Oxygen? Yes   Flow (L/min) 3  (pt uses 3L home O2)   SpO2 96 %   Pulse Oximetry Type Intermittent   $ Pulse Oximetry - Multiple Charge Pulse Oximetry - Multiple   Pulse 70   Resp 18   Aerosol Therapy   $ Aerosol Therapy Charges Aerosol Treatment   Daily Review of Necessity (SVN) completed   Respiratory Treatment Status (SVN) given   Treatment Route (SVN) mask   Patient Position (SVN) semi-Solorzano's   Post Treatment Assessment (SVN) breath sounds unchanged   Signs of Intolerance (SVN) none   Inhaler   $ Inhaler Charges MDI (Metered Dose Inahler) Treatment   Daily Review of Necessity (Inhaler) completed   Respiratory Treatment Status (Inhaler) given;mouth rinsed post treatment   Treatment Route (Inhaler) mouthpiece   Patient Position (Inhaler) semi-Solorzano's   Post Treatment Assessment (Inhaler) breath sounds unchanged   Signs of Intolerance (Inhaler) none   Breath Sounds Post-Respiratory Treatment   Post-treatment Heart Rate (beats/min) 74   Post-treatment Resp Rate (breaths/min) 18

## 2019-09-20 NOTE — PHYSICIAN QUERY
Query signed by CDI - Subsequent documentation noted per Dr. Marina 09/20 - Acute on chronic ischemic colitis   -   Mild acute pancreatitis likely secondary to abdominal ischemia       PT Name: Marisol Kerr  MR #: [id number]     Physician Query Form - Documentation Clarification      CDS/: uQiana Larsen Pe               Contact information: [phone]    This form is a permanent document in the medical record.     Query Date: September 20, 2019    By submitting this query, we are merely seeking further clarification of documentation. Please utilize your independent clinical judgment when addressing the question(s) below.    The Medical record reflects the following:    Diagnosis of Acute Pancreatitis    09/19 - Colonoscopy w/ noted multiple areas of Colitis     Please clarify in your progress notes if the Acute Pancreatitis is ruled out and if the diagnosis of   Colitis is relevant please further clarify if it is : (Acute vs Chronic  /  Infectious vs Ulcerative)                                                                             Provider Use Only                                                                                                                                 [  ] Clinically Undetermined

## 2019-09-20 NOTE — PROGRESS NOTES
[facility] Medicine  Progress Note    Patient Name: Marisol Kerr  MRN: [id number]  Patient Class: IP- Inpatient   Admission Date: 9/16/2019  Attending Physician: Pasha Marina MD  Primary Care Provider: Khadar Dwyer MD      Subjective:     Principal Problem:Diarrhea    HPI:  74-year-old female history of COPD on 3 L oxygen or home, hypertension, hyperlipidemia, IDDM, S/P cholecystectomy, history colon cancer previously on chemotherapy who lives at home alone comes in for abdominal pain, nausea and vomiting, and diarrhea. Patient reports that since January after she had her last colonoscopy she is having chronic diarrhea that she improves with Imodium. Today she was in usual health about 8 PM the day before admission when she suddenly developed nausea with nonbloody nonbilious emesis while she was in the phone with a friend. Reports having 2 episodes of emesis. Patient then attempted to walk to the bathroom but had an episode of diarrhea before she reached the bathroom. [place] and had another several episodes of nausea and vomiting associated with epigastric/chest pain. Describes chest pain as sharp radiating to her back type of pain. Denies any shortness of breath, diaphoresis. Patient's friend became concerned and brought patient to the ED for further evaluation. Reports drinking alcohol on holidays and has not had a drink in several months. In the ED, patient was found to have a lipase of 200. Initial troponin was negative and EKG was unremarkable.    Overview/Hospital Course:  Patient was admitted to the hospital for acute abdominal pain with nausea and vomiting thought to be secondary to acute pancreatitis.  IV fluids, bowel rest, Antiemetics and as needed pain medications was ordered.  Her diet was slowly advanced, no recurrent nausea or emesis.  Her lipase quickly normalized. However she continued with cramping abdominal pain associated with multiple large  volume diarrhea, acute on chronic.  Colonoscopy was performed; pathology consistent with ischemic colitis.  Due to IV contrast allergy cannot obtain CTA and will therefore pursue MRA abdomen.  The patient is mobilizing with therapy and will need home health services at discharge.  Continue wound care for stage I sacral decubitus with dry lower extremity.    Interval history:  The patient reports persistent abdominal pain, mild intensity, constant in timing with some waxing and waning of intensity, slowly improving with medical treatment.  She reports minimal nausea but no vomiting.  No chest pain, fever, or shortness of breath.    Physical exam:  Vital signs reviewed  Constitutional:  Nontoxic, comfortable appearing, pleasant, obese  HENT:  Moist mucous membranes  Head: Normocephalic and atraumatic.   Eyes:  Anicteric sclerae, no conjunctival discharge   Cardiovascular: RRR, 2+ radial pulses  Pulmonary/Chest:  Comfortable work of breathing, lungs are clear bilaterally, nasal cannula oxygen in place  Abdominal:  soft, nondistended, minimal tenderness to palpation diffusely without guarding or rebound, normal bowel sounds, soft reducible abdominal hernia  Neurological:  Alert and oriented x3, fluent speech, follows commands appropriately, hard of hearing    Skin:  Dry and warm with no jaundice, Chronic venous stasis changes with mild erythema bilateral lower extremity with dry skin and edema     Labs:  Magnesium 1.6, creatinine 1.2, platelets 136, hemoglobin 11, hematocrit 37      Echocardiogram:  · Concentric left ventricular hypertrophy.  · Normal left ventricular systolic function. The estimated ejection fraction is 65%  · Normal LV diastolic function.  · Normal right ventricular systolic function.  · Calculated RVSP is 28mmhg.      Assessment/Plan:      * Acute on chronic diarrhea  Acute on chronic ischemic colitis  Mild acute pancreatitis likely secondary to abdominal ischemia  Reports chronic diarrhea about 1  year duration.  History of colon cancer and cholecystectomy.  Acute interval worsening with cramping abdominal pain.  CT abdomen and pelvis reviewed.  Stool studies negative including C diff.  Medications reviewed-no definite inciting drugs, though was started on Mobic recently-not taking  Status post colonoscopy 9/19 as outlined.  Continue Imodium q.i.d.-home medication  Continue increased Questran dosing.  Follow-up GI workup including stool elastase/PCR and biopsies       Due to IV contrast allergy cannot obtain CTA and will therefore pursue MRA abdomen.    Hypophosphatemia  Hypokalemia  Hypomagnesemia  Replace electrolytes and repeat a.m. labs      Abnormal thyroid blood test  Low TSH with normal free T4.  Outpatient follow-up.      Bilateral nonobstructing nephrolithiasis        Ventral hernia        Decubitus ulcer of sacral region, stage 1  Severe erythema of gluteal and sacral area.  Seen by wound care this admission.    History of cholecystectomy  Was complicated by retained stone needing ERCP.  Now chronic diarrhea.      Chronic hypoxemic respiratory failure  On 3 L home oxygen.      COPD (chronic obstructive pulmonary disease)  No evidence of acute exacerbation      CKD (chronic kidney disease), stage III  Renally  dose all medications and avoid nephrotoxin drugs.      Essential hypertension, benign  Continue home medications and monitor.      VTE Risk Mitigation (From admission, onward)        Ordered     enoxaparin injection 40 mg  Daily      09/20/19 1649     IP VTE HIGH RISK PATIENT  Once      09/17/19 0237        This is a high-risk patient secondary to severe exacerbation of chronic illness (diarrhea from ischemic colitis/abdominal ischemia)        Pasha Marina MD  Department of Hospital Medicine   [facility]

## 2019-09-20 NOTE — PT/OT/SLP PROGRESS
"Occupational Therapy   Treatment    Name: Marisol Kerr  MRN: [id number]  Admitting Diagnosis:  Diarrhea  1 Day Post-Op    Recommendations:     Discharge Recommendations: home with home health, home health PT, home health OT  Discharge Equipment Recommendations:  commode  Barriers to discharge:       Assessment:     Marisol Kerr is a 74 y.o. female with a medical diagnosis of Diarrhea.  She presents with c/o abdominal pain because of my "colostophy yesterday". Performance deficits affecting function are weakness, impaired endurance, impaired self care skills, impaired functional mobilty, gait instability, decreased lower extremity function, pain, impaired cognition, impaired balance(impaired hearing, psychosocial with anxiety ).     Rehab Prognosis:  Fair; patient would benefit from acute skilled OT services to address these deficits and reach maximum level of function.       Plan:     Patient to be seen 5 x/week to address the above listed problems via self-care/home management, therapeutic activities, therapeutic exercises  · Plan of Care Expires: 10/18/19  · Plan of Care Reviewed with: patient    Subjective     Pain/Comfort:  · Pain Rating 1: 8/10  · Location 1: abdomen  · Pain Addressed 1: Distraction  · Pain Rating Post-Intervention 1: 8/10    Objective:   Patient found with bed in chair position with peripheral IV, oxygen upon OT entry to room.    General Precautions: Standard, fall   Orthopedic Precautions:N/A   Braces: N/A     Occupational Performance:     Bed Mobility:    · Patient completed Rolling/Turning to Right with modified independence and as pt depends on bed rail; pt sleeps in recliner at home  · Patient completed Scooting/Bridging with modified independence and    · Patient completed Supine to Sit with modified independence and with bedrail   · Patient completed Sit to Supine with moderate assistance and for B LE assist needed despite education on leg ; "my leg feels like lead"      Functional " Mobility/Transfers:  · Patient completed Sit <> Stand Transfer with minimum assistance and cues for hand placement and reassurance needed to high anxiety to novel environment   with  rolling walker   · Patient completed Bed <> Chair Transfer using Step Transfer technique with minimum assistance with rolling walker and and cues to maintain RW close to hips as pt over reaches the RW in front of her   · Patient completed Toilet Transfer Step Transfer technique with minimum assistance with  rolling walker and bedside commode  · Functional Mobility: short distance ambulation ~ 3 feet from bedside <>  BSC   with Min A and max cues for anxiety control, and hand placement cues and B foot placement; L LE advancement cue needed due to pt's c/o L hip arthritis pain  Activities of Daily Living:  · Lower Body Dressing: education on use of reacher for pain mgt in her L hip, SOB prevention     · Toileting: minimum assistance with pt managing her gown and CG needed during standing hygiene after bladder mgt, pt with cues needed for hand placement and reassurance due to anxiety with novel set up of DME type; pt holds RW with R hand and completes hygiene with L hand  O2 sats 95% with 3 LNC however, pt with SOB/anxiety and needs pacing,rest breaks and cues for improved breathing pattern       AMPAC 6 Click ADL:        Treatment & Education:  Pacing and breathing cues during mobility and ADL's needed; energy conservation with hip kit tools, hand and foot placement education with mobility and ADL's     Patient left with bed in chair position with all lines intact, call button in reach, bed alarm on and nursing regarding pt needing BSC at discharge;  notifiedEducation:      GOALS:   Multidisciplinary Problems     Occupational Therapy Goals        Problem: Occupational Therapy Goal    Goal Priority Disciplines Outcome Interventions   Occupational Therapy Goal     OT, PT/OT Ongoing (interventions implemented as appropriate)    Description:   Goals to be met by: discharge    Patient will increase functional independence with ADLs by performing:    UE Dressing with Supervision.  LE Dressing with Supervision.  Grooming while standing at sink with Supervision.  Toileting from bedside commode with Supervision for hygiene and clothing management.   Toilet transfer to bedside commode with Supervision.                      Time Tracking:     OT Date of Treatment: 09/20/19  OT Start Time: 0926  OT Stop Time: 1004  OT Total Time (min): 38 min    Billable Minutes:Self Care/Home Management 25  Therapeutic Activity 13    Odalys Jean OT  9/20/2019  10:20 AM

## 2019-09-20 NOTE — PT/OT/SLP PROGRESS
"Physical Therapy      Patient Name:  Marisol Kerr   MRN:  [id number]    Patient not seen today secondary to Pt stated "oh no. I am leaving today and I need my strength to leave." Pt educated in the importance of participating in Pt. Will follow-up 09/23/19.    Hillary Hammant, PTA    "

## 2019-09-20 NOTE — PLAN OF CARE
Problem: Fall Injury Risk  Goal: Absence of Fall and Fall-Related Injury  Outcome: Ongoing (interventions implemented as appropriate)  Bed alarm on. Call light within reach. Patient reminded to call for assistance.

## 2019-09-21 LAB
ANION GAP SERPL CALC-SCNC: 3 MMOL/L (ref 8–16)
BUN SERPL-MCNC: 23 MG/DL (ref 8–23)
CALCIUM SERPL-MCNC: 8.7 MG/DL (ref 8.7–10.5)
CHLORIDE SERPL-SCNC: 106 MMOL/L (ref 95–110)
CO2 SERPL-SCNC: 29 MMOL/L (ref 23–29)
CREAT SERPL-MCNC: 1.1 MG/DL (ref 0.5–1.4)
ERYTHROCYTE [DISTWIDTH] IN BLOOD BY AUTOMATED COUNT: 12.8 % (ref 11.5–14.5)
EST. GFR  (AFRICAN AMERICAN): 58 ML/MIN/1.73 M^2
EST. GFR  (NON AFRICAN AMERICAN): 50.3 ML/MIN/1.73 M^2
GLUCOSE SERPL-MCNC: 104 MG/DL (ref 70–110)
GLUCOSE SERPL-MCNC: 105 MG/DL (ref 70–110)
GLUCOSE SERPL-MCNC: 106 MG/DL (ref 70–110)
GLUCOSE SERPL-MCNC: 110 MG/DL (ref 70–110)
GLUCOSE SERPL-MCNC: 94 MG/DL (ref 70–110)
HCT VFR BLD AUTO: 36.5 % (ref 37–48.5)
HGB BLD-MCNC: 11.4 G/DL (ref 12–16)
LIPASE SERPL-CCNC: 67 U/L (ref 4–60)
MAGNESIUM SERPL-MCNC: 1.9 MG/DL (ref 1.6–2.6)
MCH RBC QN AUTO: 28.4 PG (ref 27–31)
MCHC RBC AUTO-ENTMCNC: 31.2 G/DL (ref 32–36)
MCV RBC AUTO: 91 FL (ref 82–98)
PHOSPHATE SERPL-MCNC: 3.1 MG/DL (ref 2.7–4.5)
PLATELET # BLD AUTO: 133 K/UL (ref 150–350)
PMV BLD AUTO: 11.8 FL (ref 9.2–12.9)
POTASSIUM SERPL-SCNC: 4.2 MMOL/L (ref 3.5–5.1)
RBC # BLD AUTO: 4.01 M/UL (ref 4–5.4)
SODIUM SERPL-SCNC: 138 MMOL/L (ref 136–145)
WBC # BLD AUTO: 6.93 K/UL (ref 3.9–12.7)

## 2019-09-21 PROCEDURE — 83735 ASSAY OF MAGNESIUM: CPT

## 2019-09-21 PROCEDURE — 63600175 PHARM REV CODE 636 W HCPCS: Performed by: INTERNAL MEDICINE

## 2019-09-21 PROCEDURE — 80048 BASIC METABOLIC PNL TOTAL CA: CPT

## 2019-09-21 PROCEDURE — 83690 ASSAY OF LIPASE: CPT

## 2019-09-21 PROCEDURE — 25000003 PHARM REV CODE 250: Performed by: INTERNAL MEDICINE

## 2019-09-21 PROCEDURE — 94640 AIRWAY INHALATION TREATMENT: CPT

## 2019-09-21 PROCEDURE — 12000002 HC ACUTE/MED SURGE SEMI-PRIVATE ROOM

## 2019-09-21 PROCEDURE — 27000221 HC OXYGEN, UP TO 24 HOURS

## 2019-09-21 PROCEDURE — 25000242 PHARM REV CODE 250 ALT 637 W/ HCPCS: Performed by: INTERNAL MEDICINE

## 2019-09-21 PROCEDURE — 94761 N-INVAS EAR/PLS OXIMETRY MLT: CPT

## 2019-09-21 PROCEDURE — 84100 ASSAY OF PHOSPHORUS: CPT

## 2019-09-21 PROCEDURE — 36415 COLL VENOUS BLD VENIPUNCTURE: CPT

## 2019-09-21 PROCEDURE — 85027 COMPLETE CBC AUTOMATED: CPT

## 2019-09-21 RX ADMIN — PANCRELIPASE 6 CAPSULE: 30000; 6000; 19000 CAPSULE, DELAYED RELEASE PELLETS ORAL at 12:09

## 2019-09-21 RX ADMIN — PANCRELIPASE 6 CAPSULE: 30000; 6000; 19000 CAPSULE, DELAYED RELEASE PELLETS ORAL at 09:09

## 2019-09-21 RX ADMIN — PANTOPRAZOLE SODIUM 20 MG: 20 TABLET, DELAYED RELEASE ORAL at 05:09

## 2019-09-21 RX ADMIN — HYDROCODONE BITARTRATE AND ACETAMINOPHEN 1 TABLET: 5; 325 TABLET ORAL at 01:09

## 2019-09-21 RX ADMIN — CHOLESTYRAMINE 4 G: 4 POWDER, FOR SUSPENSION ORAL at 05:09

## 2019-09-21 RX ADMIN — CASTOR OIL AND BALSAM, PERU: 788; 87 OINTMENT TOPICAL at 09:09

## 2019-09-21 RX ADMIN — FLUTICASONE FUROATE AND VILANTEROL TRIFENATATE 1 PUFF: 100; 25 POWDER RESPIRATORY (INHALATION) at 07:09

## 2019-09-21 RX ADMIN — IPRATROPIUM BROMIDE AND ALBUTEROL SULFATE 3 ML: .5; 3 SOLUTION RESPIRATORY (INHALATION) at 07:09

## 2019-09-21 RX ADMIN — IPRATROPIUM BROMIDE AND ALBUTEROL SULFATE 3 ML: .5; 3 SOLUTION RESPIRATORY (INHALATION) at 01:09

## 2019-09-21 RX ADMIN — Medication: at 09:09

## 2019-09-21 RX ADMIN — PANCRELIPASE 6 CAPSULE: 30000; 6000; 19000 CAPSULE, DELAYED RELEASE PELLETS ORAL at 06:09

## 2019-09-21 RX ADMIN — PANCRELIPASE 6 CAPSULE: 30000; 6000; 19000 CAPSULE, DELAYED RELEASE PELLETS ORAL at 10:09

## 2019-09-21 RX ADMIN — CHOLESTYRAMINE 4 G: 4 POWDER, FOR SUSPENSION ORAL at 09:09

## 2019-09-21 RX ADMIN — ATORVASTATIN CALCIUM 10 MG: 10 TABLET, FILM COATED ORAL at 08:09

## 2019-09-21 RX ADMIN — METOPROLOL SUCCINATE 50 MG: 50 TABLET, FILM COATED, EXTENDED RELEASE ORAL at 09:09

## 2019-09-21 RX ADMIN — ENOXAPARIN SODIUM 40 MG: 100 INJECTION SUBCUTANEOUS at 05:09

## 2019-09-21 RX ADMIN — ASPIRIN 81 MG: 81 TABLET, COATED ORAL at 09:09

## 2019-09-21 RX ADMIN — CHOLESTYRAMINE 4 G: 4 POWDER, FOR SUSPENSION ORAL at 08:09

## 2019-09-21 RX ADMIN — IPRATROPIUM BROMIDE AND ALBUTEROL SULFATE 3 ML: .5; 3 SOLUTION RESPIRATORY (INHALATION) at 02:09

## 2019-09-21 RX ADMIN — HYDROCODONE BITARTRATE AND ACETAMINOPHEN 1 TABLET: 5; 325 TABLET ORAL at 08:09

## 2019-09-21 NOTE — SUBJECTIVE & OBJECTIVE
Interval History: Pt is examined at bedside and c/o cramping pain on/off in lower abdomen.Pt denies any diarrhea today morning,MRA pending     Review of Systems   Constitutional: Negative.    HENT: Negative.    Respiratory: Negative.    Cardiovascular: Negative.    Gastrointestinal: Positive for abdominal pain.   Musculoskeletal: Positive for back pain.   Neurological: Negative.    Psychiatric/Behavioral: Negative.      Objective:     Vital Signs (Most Recent):  Temp: 97.5 °F (36.4 °C) (09/21/19 1210)  Pulse: 87 (09/21/19 1401)  Resp: 18 (09/21/19 1401)  BP: 135/65 (09/21/19 1210)  SpO2: 98 % (09/21/19 1401) Vital Signs (24h Range):  Temp:  [97.5 °F (36.4 °C)-98.7 °F (37.1 °C)] 97.5 °F (36.4 °C)  Pulse:  [60-87] 87  Resp:  [18-20] 18  SpO2:  [95 %-99 %] 98 %  BP: (130-157)/(57-67) 135/65     Weight: 87 kg (191 lb 12.8 oz)  Body mass index is 37.46 kg/m².    Intake/Output Summary (Last 24 hours) at 9/21/2019 1521  Last data filed at 9/21/2019 1200  Gross per 24 hour   Intake --   Output 2 ml   Net -2 ml      Physical Exam   Constitutional: She is oriented to person, place, and time. She appears well-developed and well-nourished.   HENT:   Head: Normocephalic and atraumatic.   Eyes: Pupils are equal, round, and reactive to light. EOM are normal.   Neck: Neck supple.   Cardiovascular: Normal rate and regular rhythm.   Pulmonary/Chest: Effort normal and breath sounds normal.   Abdominal: Soft. Bowel sounds are normal. There is tenderness.   Musculoskeletal: Normal range of motion.   Neurological: She is alert and oriented to person, place, and time.   Skin: Skin is warm and dry.   Psychiatric: She has a normal mood and affect.       Significant Labs:   BMP:   Recent Labs   Lab 09/21/19  0543         K 4.2      CO2 29   BUN 23   CREATININE 1.1   CALCIUM 8.7   MG 1.9     CBC:   Recent Labs   Lab 09/20/19  0520 09/21/19  0543   WBC 7.36  7.36 6.93   HGB 11.7*  11.7* 11.4*   HCT 37.6  37.6 36.5*   PLT  136*  136* 133*       Significant Imaging: I have reviewed all pertinent imaging results/findings within the past 24 hours.

## 2019-09-21 NOTE — PLAN OF CARE
Problem: Skin Injury Risk Increased  Goal: Skin Health and Integrity  Outcome: Ongoing (interventions implemented as appropriate)  Assess needs and provide assist.  Est bowel elimination and increase comfort and appetite.    Problem: Diarrhea  Goal: Fluid and Electrolyte Balance  Outcome: Ongoing (interventions implemented as appropriate)  Replace fluid and electrolyte.  Maintain perineal skin integrity and cleanse gentle and thoroughly.

## 2019-09-21 NOTE — PLAN OF CARE
09/20/19 2030   Patient Assessment/Suction   Level of Consciousness (AVPU) alert   Respiratory Effort Normal;Unlabored   Expansion/Accessory Muscles/Retractions no use of accessory muscles   All Lung Fields Breath Sounds clear   PRE-TX-O2   O2 Device (Oxygen Therapy) nasal cannula   Flow (L/min) 3   SpO2 98 %   Pulse Oximetry Type Intermittent   Pulse 63   Resp 19   Temp 97.9 °F (36.6 °C)   Positioning   Body Position side-lying, right   Aerosol Therapy   $ Aerosol Therapy Charges PRN treatment not required   Daily Review of Necessity (SVN) completed   continue tx. As ordered

## 2019-09-21 NOTE — PROGRESS NOTES
[facility], later [facility]  Progress Note    Patient Name: Marisol Kerr  MRN: [id number]  Patient Class: IP- Inpatient   Admission Date: 9/16/2019  Length of Stay: 4 days  Attending Physician: Taylor Mendiola MD  Primary Care Provider: Khadar Dwyer MD        Subjective:     Principal Problem:Diarrhea        HPI:  74-year-old female history of COPD on 3 L oxygen or home, hypertension, hyperlipidemia, IDDM, S/P cholecystectomy, history colon cancer previously on chemotherapy who lives at home alone comes in for abdominal pain, nausea and vomiting, and diarrhea. Patient reports that since January after she had her last colonoscopy she is having chronic diarrhea that she improves with Imodium. Today she was in usual health about 8 PM the day before admission when she suddenly developed nausea with nonbloody nonbilious emesis while she was in the phone with a friend. Reports having 2 episodes of emesis. Patient then attempted to walk to the bathroom but had an episode of diarrhea before she reached the bathroom. She then came [place] and had another several episodes of nausea and vomiting associated with epigastric/chest pain. Describes chest pain as sharp radiating to her back type of pain. Denies any shortness of breath, diaphoresis. Patient's friend became concerned and brought patient to the ED for further evaluation. Reports drinking alcohol on holidays and has not had a drink in several months. In the ED, patient was found to have a lipase of 200. Initial troponin was negative and EKG was unremarkable.    Overview/Hospital Course:  Patient was admitted to the hospital for management of acute pancreatitis.  Antiemetics and as needed pain medications was ordered.  Her diet was slowly advanced, no recurrent nausea or emesis.  However she continued with cramping abdominal pain associated with multiple large volume diarrhea, acute on chronic.  She was seen by therapy with recommendations for home  health and patient was accepting of same.  Seen by wound care, stage I sacral decubitus with dry lower extremity, recommendations noted. Plans for colonoscopy 9/19.    Interval History: Pt is examined at bedside and c/o cramping pain on/off in lower abdomen.Pt denies any diarrhea today morning,MRA pending     Review of Systems   Constitutional: Negative.    HENT: Negative.    Respiratory: Negative.    Cardiovascular: Negative.    Gastrointestinal: Positive for abdominal pain.   Musculoskeletal: Positive for back pain.   Neurological: Negative.    Psychiatric/Behavioral: Negative.      Objective:     Vital Signs (Most Recent):  Temp: 97.5 °F (36.4 °C) (09/21/19 1210)  Pulse: 87 (09/21/19 1401)  Resp: 18 (09/21/19 1401)  BP: 135/65 (09/21/19 1210)  SpO2: 98 % (09/21/19 1401) Vital Signs (24h Range):  Temp:  [97.5 °F (36.4 °C)-98.7 °F (37.1 °C)] 97.5 °F (36.4 °C)  Pulse:  [60-87] 87  Resp:  [18-20] 18  SpO2:  [95 %-99 %] 98 %  BP: (130-157)/(57-67) 135/65     Weight: 87 kg (191 lb 12.8 oz)  Body mass index is 37.46 kg/m².    Intake/Output Summary (Last 24 hours) at 9/21/2019 1521  Last data filed at 9/21/2019 1200  Gross per 24 hour   Intake --   Output 2 ml   Net -2 ml      Physical Exam   Constitutional: She is oriented to person, place, and time. She appears well-developed and well-nourished.   HENT:   Head: Normocephalic and atraumatic.   Eyes: Pupils are equal, round, and reactive to light. EOM are normal.   Neck: Neck supple.   Cardiovascular: Normal rate and regular rhythm.   Pulmonary/Chest: Effort normal and breath sounds normal.   Abdominal: Soft. Bowel sounds are normal. There is tenderness.   Musculoskeletal: Normal range of motion.   Neurological: She is alert and oriented to person, place, and time.   Skin: Skin is warm and dry.   Psychiatric: She has a normal mood and affect.       Significant Labs:   BMP:   Recent Labs   Lab 09/21/19  0543         K 4.2      CO2 29   BUN 23    CREATININE 1.1   CALCIUM 8.7   MG 1.9     CBC:   Recent Labs   Lab 09/20/19  0520 09/21/19  0543   WBC 7.36  7.36 6.93   HGB 11.7*  11.7* 11.4*   HCT 37.6  37.6 36.5*   *  136* 133*       Significant Imaging: I have reviewed all pertinent imaging results/findings within the past 24 hours.      Assessment/Plan:      * Acute on chronic diarrhea  Reports chronic diarrhea about 1 year duration.  History of colon cancer and cholecystectomy.  Acute interval worsening with cramping abdominal pain.  CT abdomen and pelvis reviewed.  Stool studies negative including C diff.  Medications reviewed-no definite inciting drugs, though was started on Mobic recently-not taking  Status post colonoscopy 9/19 as outlined.  Continue Imodium q.i.d.-home medication  Increased Questran dosing.  GI consulted, recommended stool elastase/PCR, voiding NSAID, random biopsies sent will follow up    Hypophosphatemia  Replete and monitor.      Abnormal thyroid blood test  Low TSH with normal free T4.  Outpatient follow-up.      Bilateral nonobstructing nephrolithiasis        Ventral hernia        Decubitus ulcer of sacral region, stage 1  Pressure ulcer on sacrum,wound care as needed     History of cholecystectomy  Reports chronic diarrhea about 1 year duration.  History of colon cancer and cholecystectomy.  Acute interval worsening with cramping abdominal pain.  CT abdomen and pelvis reviewed.  Stool studies negative including C diff.  C/w Imodium,MRA pending         Chronic hypoxemic respiratory failure  On 3 L home oxygen.      COPD (chronic obstructive pulmonary disease)  No evidence of acute exacerbation      CKD (chronic kidney disease), stage III  Renally  dose all medications and avoid nephrotoxin drugs.      Diabetes mellitus type 2, controlled        Essential hypertension, benign  Continue home medications and monitor.        VTE Risk Mitigation (From admission, onward)        Ordered     enoxaparin injection 40 mg  Daily       09/20/19 1649     IP VTE HIGH RISK PATIENT  Once      09/17/19 0237                Taylor Mendiola MD  Department of Hospital Medicine   [facility]

## 2019-09-21 NOTE — ASSESSMENT & PLAN NOTE
Reports chronic diarrhea about 1 year duration.  History of colon cancer and cholecystectomy.  Acute interval worsening with cramping abdominal pain.  CT abdomen and pelvis reviewed.  Stool studies negative including C diff.  C/w Imodium,MRA pending

## 2019-09-21 NOTE — PLAN OF CARE
09/21/19 0705   Patient Assessment/Suction   Level of Consciousness (AVPU) alert   Respiratory Effort Normal;Unlabored   Expansion/Accessory Muscles/Retractions no retractions;no use of accessory muscles   All Lung Fields Breath Sounds clear   PRE-TX-O2   O2 Device (Oxygen Therapy) nasal cannula   $ Is the patient on Low Flow Oxygen? Yes   Flow (L/min) 3   SpO2 96 %   Pulse Oximetry Type Intermittent   $ Pulse Oximetry - Multiple Charge Pulse Oximetry - Multiple   Pulse 60   Resp 18   Aerosol Therapy   $ Aerosol Therapy Charges Aerosol Treatment   Daily Review of Necessity (SVN) completed   Respiratory Treatment Status (SVN) given   Treatment Route (SVN) mask   Patient Position (SVN) HOB elevated   Post Treatment Assessment (SVN) breath sounds unchanged   Signs of Intolerance (SVN) none   Inhaler   $ Inhaler Charges MDI (Metered Dose Inahler) Treatment;Mouth rinsed post treatment   Daily Review of Necessity (Inhaler) completed   Respiratory Treatment Status (Inhaler) given   Treatment Route (Inhaler) mouthpiece   Patient Position (Inhaler) HOB elevated   Post Treatment Assessment (Inhaler) breath sounds unchanged   Signs of Intolerance (Inhaler) none   Breath Sounds Post-Respiratory Treatment   Post-treatment Heart Rate (beats/min) 69   Post-treatment Resp Rate (breaths/min) 18

## 2019-09-22 LAB
ANION GAP SERPL CALC-SCNC: 9 MMOL/L (ref 8–16)
BUN SERPL-MCNC: 23 MG/DL (ref 8–23)
CALCIUM SERPL-MCNC: 9.2 MG/DL (ref 8.7–10.5)
CHLORIDE SERPL-SCNC: 102 MMOL/L (ref 95–110)
CO2 SERPL-SCNC: 31 MMOL/L (ref 23–29)
CREAT SERPL-MCNC: 1.1 MG/DL (ref 0.5–1.4)
ERYTHROCYTE [DISTWIDTH] IN BLOOD BY AUTOMATED COUNT: 12.5 % (ref 11.5–14.5)
EST. GFR  (AFRICAN AMERICAN): 58 ML/MIN/1.73 M^2
EST. GFR  (NON AFRICAN AMERICAN): 50.3 ML/MIN/1.73 M^2
GLUCOSE SERPL-MCNC: 114 MG/DL (ref 70–110)
GLUCOSE SERPL-MCNC: 129 MG/DL (ref 70–110)
GLUCOSE SERPL-MCNC: 86 MG/DL (ref 70–110)
GLUCOSE SERPL-MCNC: 88 MG/DL (ref 70–110)
GLUCOSE SERPL-MCNC: 93 MG/DL (ref 70–110)
HCT VFR BLD AUTO: 35.9 % (ref 37–48.5)
HGB BLD-MCNC: 11.3 G/DL (ref 12–16)
LIPASE SERPL-CCNC: 67 U/L (ref 4–60)
MAGNESIUM SERPL-MCNC: 1.8 MG/DL (ref 1.6–2.6)
MCH RBC QN AUTO: 28.5 PG (ref 27–31)
MCHC RBC AUTO-ENTMCNC: 31.5 G/DL (ref 32–36)
MCV RBC AUTO: 91 FL (ref 82–98)
PHOSPHATE SERPL-MCNC: 3.4 MG/DL (ref 2.7–4.5)
PLATELET # BLD AUTO: 148 K/UL (ref 150–350)
PMV BLD AUTO: 12.1 FL (ref 9.2–12.9)
POTASSIUM SERPL-SCNC: 4.7 MMOL/L (ref 3.5–5.1)
RBC # BLD AUTO: 3.96 M/UL (ref 4–5.4)
SODIUM SERPL-SCNC: 142 MMOL/L (ref 136–145)
WBC # BLD AUTO: 6.81 K/UL (ref 3.9–12.7)

## 2019-09-22 PROCEDURE — 84100 ASSAY OF PHOSPHORUS: CPT

## 2019-09-22 PROCEDURE — 36415 COLL VENOUS BLD VENIPUNCTURE: CPT

## 2019-09-22 PROCEDURE — 94640 AIRWAY INHALATION TREATMENT: CPT

## 2019-09-22 PROCEDURE — 99900035 HC TECH TIME PER 15 MIN (STAT)

## 2019-09-22 PROCEDURE — 25000003 PHARM REV CODE 250: Performed by: INTERNAL MEDICINE

## 2019-09-22 PROCEDURE — 83735 ASSAY OF MAGNESIUM: CPT

## 2019-09-22 PROCEDURE — 27000221 HC OXYGEN, UP TO 24 HOURS

## 2019-09-22 PROCEDURE — 85027 COMPLETE CBC AUTOMATED: CPT

## 2019-09-22 PROCEDURE — 12000002 HC ACUTE/MED SURGE SEMI-PRIVATE ROOM

## 2019-09-22 PROCEDURE — 25000242 PHARM REV CODE 250 ALT 637 W/ HCPCS: Performed by: INTERNAL MEDICINE

## 2019-09-22 PROCEDURE — 63600175 PHARM REV CODE 636 W HCPCS: Performed by: INTERNAL MEDICINE

## 2019-09-22 PROCEDURE — 80048 BASIC METABOLIC PNL TOTAL CA: CPT

## 2019-09-22 PROCEDURE — 83690 ASSAY OF LIPASE: CPT

## 2019-09-22 PROCEDURE — 94761 N-INVAS EAR/PLS OXIMETRY MLT: CPT

## 2019-09-22 RX ADMIN — FLUTICASONE FUROATE AND VILANTEROL TRIFENATATE 1 PUFF: 100; 25 POWDER RESPIRATORY (INHALATION) at 09:09

## 2019-09-22 RX ADMIN — METOPROLOL SUCCINATE 50 MG: 50 TABLET, FILM COATED, EXTENDED RELEASE ORAL at 09:09

## 2019-09-22 RX ADMIN — Medication: at 09:09

## 2019-09-22 RX ADMIN — PANCRELIPASE 6 CAPSULE: 30000; 6000; 19000 CAPSULE, DELAYED RELEASE PELLETS ORAL at 09:09

## 2019-09-22 RX ADMIN — ENOXAPARIN SODIUM 40 MG: 100 INJECTION SUBCUTANEOUS at 05:09

## 2019-09-22 RX ADMIN — IPRATROPIUM BROMIDE AND ALBUTEROL SULFATE 3 ML: .5; 3 SOLUTION RESPIRATORY (INHALATION) at 09:09

## 2019-09-22 RX ADMIN — CHOLESTYRAMINE 4 G: 4 POWDER, FOR SUSPENSION ORAL at 09:09

## 2019-09-22 RX ADMIN — CHOLESTYRAMINE 4 G: 4 POWDER, FOR SUSPENSION ORAL at 03:09

## 2019-09-22 RX ADMIN — PANCRELIPASE 6 CAPSULE: 30000; 6000; 19000 CAPSULE, DELAYED RELEASE PELLETS ORAL at 05:09

## 2019-09-22 RX ADMIN — IPRATROPIUM BROMIDE AND ALBUTEROL SULFATE 3 ML: .5; 3 SOLUTION RESPIRATORY (INHALATION) at 03:09

## 2019-09-22 RX ADMIN — ASPIRIN 81 MG: 81 TABLET, COATED ORAL at 09:09

## 2019-09-22 RX ADMIN — ATORVASTATIN CALCIUM 10 MG: 10 TABLET, FILM COATED ORAL at 09:09

## 2019-09-22 RX ADMIN — CASTOR OIL AND BALSAM, PERU: 788; 87 OINTMENT TOPICAL at 09:09

## 2019-09-22 RX ADMIN — PANTOPRAZOLE SODIUM 20 MG: 20 TABLET, DELAYED RELEASE ORAL at 05:09

## 2019-09-22 RX ADMIN — PANCRELIPASE 6 CAPSULE: 30000; 6000; 19000 CAPSULE, DELAYED RELEASE PELLETS ORAL at 11:09

## 2019-09-22 NOTE — NURSING
Quiet uneventful shift, uses BSC to void, urine clear yellow no c/o pain noted. Bed low call light in reach. NAD

## 2019-09-22 NOTE — PROGRESS NOTES
[facility]  Progress Note    Patient Name: Marisol Kerr  MRN: [id number]  Patient Class: IP- Inpatient   Admission Date: 9/16/2019  Length of Stay: 5 days  Attending Physician: Per West MD  Primary Care Provider: Khadar Dwyer MD        Subjective:     Principal Problem:Diarrhea        HPI:  74-year-old female history of COPD on 3 L oxygen or home, hypertension, hyperlipidemia, IDDM, S/P cholecystectomy, history colon cancer previously on chemotherapy who lives at home alone comes in for abdominal pain, nausea and vomiting, and diarrhea. Patient reports that since January after she had her last colonoscopy she is having chronic diarrhea that she improves with Imodium. Today she was in usual health about 8 PM the day before admission when she suddenly developed nausea with nonbloody nonbilious emesis while she was in the phone with a friend. Reports having 2 episodes of emesis. Patient then attempted to walk to the bathroom but had an episode of diarrhea before she reached the bathroom. [place] then [place] and had another several episodes of nausea and vomiting associated with epigastric/chest pain. Describes chest pain as sharp radiating to her back type of pain. Denies any shortness of breath, diaphoresis. Patient's friend became concerned and brought patient to the ED for further evaluation. Reports drinking alcohol on holidays and has not had a drink in several months. In the ED, patient was found to have a lipase of 200. Initial troponin was negative and EKG was unremarkable.    Overview/Hospital Course:  Patient was admitted to the hospital for management of acute pancreatitis.  Antiemetics and as needed pain medications was ordered.  Her diet was slowly advanced, no recurrent nausea or emesis.  However she continued with cramping abdominal pain associated with multiple large volume diarrhea, acute on chronic.  She was seen by therapy with recommendations for  home health and patient was accepting of same.  Seen by wound care, stage I sacral decubitus with dry lower extremity, recommendations noted. Plans for colonoscopy 9/19.    Interval History: No acute events overnight per nursing. Patient lying in bed in [facility], plan of care discussed. Patient reports she feel lousy and still with diarrhea and abdominal cramping. Stool for C-diff negative. Antidiarrhea medicine available. MRA pending. Continue current plan of care    09/17/2019. CT without acute abdominal findings.       Review of Systems   Constitutional: Negative for chills, diaphoresis and fever.   HENT: Negative for congestion, postnasal drip, sinus pressure and sore throat.    Eyes: Negative for visual disturbance.   Respiratory: Negative for cough, chest tightness, shortness of breath and wheezing.    Cardiovascular: Negative for chest pain, palpitations and leg swelling.   Gastrointestinal: Positive for abdominal pain (abdominal cramping) and diarrhea. Negative for abdominal distention, blood in stool, constipation, nausea and vomiting.   Endocrine: Negative.    Genitourinary: Negative for dysuria.   Musculoskeletal: Negative.    Skin: Negative.    Allergic/Immunologic: Negative.    Neurological: Negative for dizziness, weakness, numbness and headaches.   Hematological: Negative.    Psychiatric/Behavioral: Negative.      Objective:     Vital Signs (Most Recent):  Temp: 98.3 °F (36.8 °C) (09/22/19 1130)  Pulse: 70 (09/22/19 1130)  Resp: 19 (09/22/19 1130)  BP: (!) 144/71 (09/22/19 1130)  SpO2: 97 % (09/22/19 1130) Vital Signs (24h Range):  Temp:  [97.6 °F (36.4 °C)-98.6 °F (37 °C)] 98.3 °F (36.8 °C)  Pulse:  [58-87] 70  Resp:  [16-19] 19  SpO2:  [96 %-100 %] 97 %  BP: (129-173)/(68-87) 144/71     Weight: 87 kg (191 lb 12.8 oz)  Body mass index is 37.46 kg/m².    Intake/Output Summary (Last 24 hours) at 9/22/2019 1338  Last data filed at 9/21/2019 1600  Gross per 24 hour   Intake --   Output 1 ml   Net -1 ml       Physical Exam   Constitutional: She is oriented to person, place, and time. She appears well-developed and well-nourished. She is cooperative.  Non-toxic appearance. No distress.   HENT:   Head: Normocephalic and atraumatic.   Eyes: Pupils are equal, round, and reactive to light. Conjunctivae and lids are normal.   Neck: Trachea normal, normal range of motion and full passive range of motion without pain. Neck supple. Normal carotid pulses and no JVD present. No tracheal deviation present. No thyroid mass and no thyromegaly present.   Cardiovascular: Normal rate, regular rhythm, S1 normal, S2 normal, normal heart sounds and normal pulses.   Pulmonary/Chest: Effort normal and breath sounds normal. No stridor.   Abdominal: Soft. Normal appearance and bowel sounds are normal. There is no tenderness.   Musculoskeletal: Normal range of motion.   Neurological: She is alert and oriented to person, place, and time. She has normal strength. No cranial nerve deficit or sensory deficit.   Skin: Skin is warm, dry and intact. She is not diaphoretic. No cyanosis. Nails show no clubbing.   Psychiatric: She has a normal mood and affect. Her speech is normal and behavior is normal. Judgment and thought content normal. Cognition and memory are normal.       Significant Labs:   Bilirubin:   Recent Labs   Lab 09/16/19  2250   BILITOT 0.9     CBC:   Recent Labs   Lab 09/21/19  0543 09/22/19  0440   WBC 6.93 6.81   HGB 11.4* 11.3*   HCT 36.5* 35.9*   * 148*     CMP:   Recent Labs   Lab 09/21/19  0543 09/22/19  0440    142   K 4.2 4.7    102   CO2 29 31*    93   BUN 23 23   CREATININE 1.1 1.1   CALCIUM 8.7 9.2   ANIONGAP 3* 9   EGFRNONAA 50.3* 50.3*     Lactic Acid: No results for input(s): LACTATE in the last 48 hours.  Lipase:   Recent Labs   Lab 09/21/19  0543 09/22/19  0440   LIPASE 67* 67*       Significant Imaging: I have reviewed all pertinent imaging results/findings within the past 24 hours.   X-ray  Chest Ap Portable    Result Date: 9/17/2019  PROCEDURE:   XR CHEST AP PORTABLE  dated  9/16/2019 10:45 PM CLINICAL HISTORY:   Female 74 years of age.   chest pain, low o2, hx of copd, cad, chf, htn, ca TECHNIQUE: AP view of the chest obtained portably at 10:45 PM. PREVIOUS STUDIES:  May 10, 2019. January 25, 2015. November 14, 2013. FINDINGS: There is a vascular catheter from right subclavian venous approach. The tip is at the level of the confluence of the right and left brachiocephalic veins. This catheter apparently has been present since at least November 2013. Patient is rotated mildly to the right. Mild cardiomegaly is stable compared with the most recent study which is radiography from May 2019. The lungs are clear. There is no pleural effusion or pneumothorax. IMPRESSION: 1. No acute findings. 2. Central vascular catheter apparently present since at least 2013. Electronically Signed by Tessie Orellana on 9/17/2019 6:51 AM    Ct Abdomen Pelvis  Without Contrast    Result Date: 9/17/2019  CMS MANDATED QUALITY DATA - CT RADIATION - 436 All CT exams at this facility use dose modulation, iterative reconstruction, and or weight based dosing when appropriate to reduce radiation dose to as low as reasonably achievable. HISTORY: Acute abdominal pain, gastroenteritis or colitis. FINDINGS: Noncontrast axial images were obtained. Nonenhanced study is tailored for the detection of urolithiasis, and is insensitive for abnormalities of the solid organs, vasculature and hollow viscera. Comparison to 05/10/2019. CT ABDOMEN: There are scattered linear and groundglass opacities at both lung bases, suggesting subsegmental atelectasis, with the lung bases otherwise clear. Branching linear lucencies in the left hepatic lobe are suggestive of pneumobilia. There are cholecystectomy clips, with mild extrahepatic biliary ductal dilatation. The unenhanced liver is otherwise unremarkable. The unenhanced spleen, pancreas and right  adrenal gland are unremarkable, with mixed fat density and soft tissue density left adrenal mass measuring 3.4 cm, compatible with myelolipoma. There are bilateral nonobstructing renal calculi, with no ureteral calculi or hydroureteronephrosis. Moderate diffuse calcified plaque involves normal caliber abdominal aorta and iliac arteries. Large periampullary duodenal diverticulum is noted. There is no bowel obstruction, ascites, or intraperitoneal free air. The appendix is normal. CT PELVIS: There are no distal ureteral or bladder calculi. The unenhanced sigmoid colon, rectum and urinary bladder are unremarkable. There are scattered pelvic arterial vascular calcifications, with no pelvic free fluid. No inguinal or femoral hernia. There is degenerative intervertebral disc space narrowing and facet arthropathy in the lumbar spine, with advanced degenerative narrowing of both hip joint spaces, with complete chondral loss, prominent osteophytes, and subcortical sclerosis and cystic changes. Fat density mass within the left gluteal musculature is suggestive of intramuscular lipoma. IMPRESSION: 1. No definite acute findings in the abdomen or the pelvis. 2. Additional observations as above. Electronically Signed by Kev PATEL on 9/17/2019 2:30 AM        Assessment/Plan:      * Acute on chronic diarrhea  Reports chronic diarrhea about 1 year duration.  History of colon cancer and cholecystectomy.  Acute interval worsening with cramping abdominal pain.  CT abdomen and pelvis reviewed.  Stool studies negative including C diff.  Medications reviewed-no definite inciting drugs, though was started on Mobic recently-not taking  Status post colonoscopy 9/19 as outlined.  Continue Imodium q.i.d.-home medication  Increased Questran dosing.  GI consulted, recommended stool elastase/PCR, voiding NSAID, random biopsies sent will follow up    Hypophosphatemia  Replete and monitor.      Abnormal thyroid blood test  Low TSH with  normal free T4.  Outpatient follow-up.      Bilateral nonobstructing nephrolithiasis        Ventral hernia        Decubitus ulcer of sacral region, stage 1  Pressure ulcer on sacrum,wound care as needed     History of cholecystectomy  Reports chronic diarrhea about 1 year duration.  History of colon cancer and cholecystectomy.  Acute interval worsening with cramping abdominal pain.  CT abdomen and pelvis reviewed.  Stool studies negative including C diff.  C/w Imodium,MRA pending         Chronic hypoxemic respiratory failure  On 3 L home oxygen.      COPD (chronic obstructive pulmonary disease)  No evidence of acute exacerbation      CKD (chronic kidney disease), stage III  Renally  dose all medications and avoid nephrotoxin drugs.      Diabetes mellitus type 2, controlled        Essential hypertension, benign  Continue home medications and monitor.        VTE Risk Mitigation (From admission, onward)         Ordered     enoxaparin injection 40 mg  Daily      09/20/19 1649     IP VTE HIGH RISK PATIENT  Once      09/17/19 0239                      KAIA Redmond  Department of Hospital Medicine   [facility]

## 2019-09-22 NOTE — SUBJECTIVE & OBJECTIVE
Interval History: No acute events overnight per nursing. Patient lying in bed in [facility], plan of care discussed. Patient reports she feel lousy and still with diarrhea and abdominal cramping. Stool for C-diff negative. Antidiarrhea medicine available. MRA pending. Continue current plan of care    09/17/2019. CT without acute abdominal findings.       Review of Systems   Constitutional: Negative for chills, diaphoresis and fever.   HENT: Negative for congestion, postnasal drip, sinus pressure and sore throat.    Eyes: Negative for visual disturbance.   Respiratory: Negative for cough, chest tightness, shortness of breath and wheezing.    Cardiovascular: Negative for chest pain, palpitations and leg swelling.   Gastrointestinal: Positive for abdominal pain (abdominal cramping) and diarrhea. Negative for abdominal distention, blood in stool, constipation, nausea and vomiting.   Endocrine: Negative.    Genitourinary: Negative for dysuria.   Musculoskeletal: Negative.    Skin: Negative.    Allergic/Immunologic: Negative.    Neurological: Negative for dizziness, weakness, numbness and headaches.   Hematological: Negative.    Psychiatric/Behavioral: Negative.      Objective:     Vital Signs (Most Recent):  Temp: 98.3 °F (36.8 °C) (09/22/19 1130)  Pulse: 70 (09/22/19 1130)  Resp: 19 (09/22/19 1130)  BP: (!) 144/71 (09/22/19 1130)  SpO2: 97 % (09/22/19 1130) Vital Signs (24h Range):  Temp:  [97.6 °F (36.4 °C)-98.6 °F (37 °C)] 98.3 °F (36.8 °C)  Pulse:  [58-87] 70  Resp:  [16-19] 19  SpO2:  [96 %-100 %] 97 %  BP: (129-173)/(68-87) 144/71     Weight: 87 kg (191 lb 12.8 oz)  Body mass index is 37.46 kg/m².    Intake/Output Summary (Last 24 hours) at 9/22/2019 1338  Last data filed at 9/21/2019 1600  Gross per 24 hour   Intake --   Output 1 ml   Net -1 ml      Physical Exam   Constitutional: She is oriented to person, place, and time. She appears well-developed and well-nourished. She is cooperative.  Non-toxic appearance. No  distress.   HENT:   Head: Normocephalic and atraumatic.   Eyes: Pupils are equal, round, and reactive to light. Conjunctivae and lids are normal.   Neck: Trachea normal, normal range of motion and full passive range of motion without pain. Neck supple. Normal carotid pulses and no JVD present. No tracheal deviation present. No thyroid mass and no thyromegaly present.   Cardiovascular: Normal rate, regular rhythm, S1 normal, S2 normal, normal heart sounds and normal pulses.   Pulmonary/Chest: Effort normal and breath sounds normal. No stridor.   Abdominal: Soft. Normal appearance and bowel sounds are normal. There is no tenderness.   Musculoskeletal: Normal range of motion.   Neurological: She is alert and oriented to person, place, and time. She has normal strength. No cranial nerve deficit or sensory deficit.   Skin: Skin is warm, dry and intact. She is not diaphoretic. No cyanosis. Nails show no clubbing.   Psychiatric: She has a normal mood and affect. Her speech is normal and behavior is normal. Judgment and thought content normal. Cognition and memory are normal.       Significant Labs:   Bilirubin:   Recent Labs   Lab 09/16/19  2250   BILITOT 0.9     CBC:   Recent Labs   Lab 09/21/19  0543 09/22/19  0440   WBC 6.93 6.81   HGB 11.4* 11.3*   HCT 36.5* 35.9*   * 148*     CMP:   Recent Labs   Lab 09/21/19  0543 09/22/19  0440    142   K 4.2 4.7    102   CO2 29 31*    93   BUN 23 23   CREATININE 1.1 1.1   CALCIUM 8.7 9.2   ANIONGAP 3* 9   EGFRNONAA 50.3* 50.3*     Lactic Acid: No results for input(s): LACTATE in the last 48 hours.  Lipase:   Recent Labs   Lab 09/21/19  0543 09/22/19  0440   LIPASE 67* 67*       Significant Imaging: I have reviewed all pertinent imaging results/findings within the past 24 hours.   X-ray Chest Ap Portable    Result Date: 9/17/2019  PROCEDURE:   XR CHEST AP PORTABLE  dated  9/16/2019 10:45 PM CLINICAL HISTORY:   Female 74 years of age.   chest pain, low o2,  hx of copd, cad, chf, htn, ca TECHNIQUE: AP view of the chest obtained portably at 10:45 PM. PREVIOUS STUDIES:  May 10, 2019. January 25, 2015. November 14, 2013. FINDINGS: There is a vascular catheter from right subclavian venous approach. The tip is at the level of the confluence of the right and left brachiocephalic veins. This catheter apparently has been present since at least November 2013. Patient is rotated mildly to the right. Mild cardiomegaly is stable compared with the most recent study which is radiography from May 2019. The lungs are clear. There is no pleural effusion or pneumothorax. IMPRESSION: 1. No acute findings. 2. Central vascular catheter apparently present since at least 2013. Electronically Signed by Tessie Orellana on 9/17/2019 6:51 AM    Ct Abdomen Pelvis  Without Contrast    Result Date: 9/17/2019  CMS MANDATED QUALITY DATA - CT RADIATION - 436 All CT exams at this facility use dose modulation, iterative reconstruction, and or weight based dosing when appropriate to reduce radiation dose to as low as reasonably achievable. HISTORY: Acute abdominal pain, gastroenteritis or colitis. FINDINGS: Noncontrast axial images were obtained. Nonenhanced study is tailored for the detection of urolithiasis, and is insensitive for abnormalities of the solid organs, vasculature and hollow viscera. Comparison to 05/10/2019. CT ABDOMEN: There are scattered linear and groundglass opacities at both lung bases, suggesting subsegmental atelectasis, with the lung bases otherwise clear. Branching linear lucencies in the left hepatic lobe are suggestive of pneumobilia. There are cholecystectomy clips, with mild extrahepatic biliary ductal dilatation. The unenhanced liver is otherwise unremarkable. The unenhanced spleen, pancreas and right adrenal gland are unremarkable, with mixed fat density and soft tissue density left adrenal mass measuring 3.4 cm, compatible with myelolipoma. There are bilateral nonobstructing  renal calculi, with no ureteral calculi or hydroureteronephrosis. Moderate diffuse calcified plaque involves normal caliber abdominal aorta and iliac arteries. Large periampullary duodenal diverticulum is noted. There is no bowel obstruction, ascites, or intraperitoneal free air. The appendix is normal. CT PELVIS: There are no distal ureteral or bladder calculi. The unenhanced sigmoid colon, rectum and urinary bladder are unremarkable. There are scattered pelvic arterial vascular calcifications, with no pelvic free fluid. No inguinal or femoral hernia. There is degenerative intervertebral disc space narrowing and facet arthropathy in the lumbar spine, with advanced degenerative narrowing of both hip joint spaces, with complete chondral loss, prominent osteophytes, and subcortical sclerosis and cystic changes. Fat density mass within the left gluteal musculature is suggestive of intramuscular lipoma. IMPRESSION: 1. No definite acute findings in the abdomen or the pelvis. 2. Additional observations as above. Electronically Signed by Kev PATEL on 9/17/2019 2:30 AM

## 2019-09-22 NOTE — PLAN OF CARE
09/21/19 1939   Patient Assessment/Suction   Respiratory Effort Unlabored   All Lung Fields Breath Sounds clear   Rhythm/Pattern, Respiratory pattern regular   PRE-TX-O2   O2 Device (Oxygen Therapy) nasal cannula   Flow (L/min) 2   SpO2 98 %   Pulse 81   Resp 16   Aerosol Therapy   $ Aerosol Therapy Charges Aerosol Treatment   Respiratory Treatment Status (SVN) given   Treatment Route (SVN) mask   Patient Position (SVN) semi-Solorzano's   Post Treatment Assessment (SVN) increased aeration   Signs of Intolerance (SVN) none   Breath Sounds Post-Respiratory Treatment   Throughout All Fields Post-Treatment All Fields   Throughout All Fields Post-Treatment wheezes, expiratory   Post-treatment Heart Rate (beats/min) 82   Post-treatment Resp Rate (breaths/min) 16

## 2019-09-23 PROBLEM — Z90.49 HISTORY OF CHOLECYSTECTOMY: Chronic | Status: RESOLVED | Noted: 2019-09-17 | Resolved: 2019-09-23

## 2019-09-23 LAB
ANION GAP SERPL CALC-SCNC: 7 MMOL/L (ref 8–16)
BUN SERPL-MCNC: 21 MG/DL (ref 8–23)
CALCIUM SERPL-MCNC: 9.6 MG/DL (ref 8.7–10.5)
CHLORIDE SERPL-SCNC: 105 MMOL/L (ref 95–110)
CO2 SERPL-SCNC: 30 MMOL/L (ref 23–29)
CREAT SERPL-MCNC: 1.1 MG/DL (ref 0.5–1.4)
ELASTASE PANC STL-MCNT: 132 UG ELAST./G
ERYTHROCYTE [DISTWIDTH] IN BLOOD BY AUTOMATED COUNT: 12.4 % (ref 11.5–14.5)
EST. GFR  (AFRICAN AMERICAN): 58 ML/MIN/1.73 M^2
EST. GFR  (NON AFRICAN AMERICAN): 50.3 ML/MIN/1.73 M^2
GLUCOSE SERPL-MCNC: 105 MG/DL (ref 70–110)
GLUCOSE SERPL-MCNC: 115 MG/DL (ref 70–110)
GLUCOSE SERPL-MCNC: 131 MG/DL (ref 70–110)
GLUCOSE SERPL-MCNC: 98 MG/DL (ref 70–110)
HCT VFR BLD AUTO: 39.1 % (ref 37–48.5)
HGB BLD-MCNC: 12.5 G/DL (ref 12–16)
LIPASE SERPL-CCNC: 72 U/L (ref 4–60)
MAGNESIUM SERPL-MCNC: 1.7 MG/DL (ref 1.6–2.6)
MCH RBC QN AUTO: 28.7 PG (ref 27–31)
MCHC RBC AUTO-ENTMCNC: 32 G/DL (ref 32–36)
MCV RBC AUTO: 90 FL (ref 82–98)
PHOSPHATE SERPL-MCNC: 3.3 MG/DL (ref 2.7–4.5)
PLATELET # BLD AUTO: 181 K/UL (ref 150–350)
PMV BLD AUTO: 12.2 FL (ref 9.2–12.9)
POTASSIUM SERPL-SCNC: 4.6 MMOL/L (ref 3.5–5.1)
RBC # BLD AUTO: 4.35 M/UL (ref 4–5.4)
SODIUM SERPL-SCNC: 142 MMOL/L (ref 136–145)
WBC # BLD AUTO: 7.89 K/UL (ref 3.9–12.7)

## 2019-09-23 PROCEDURE — 83690 ASSAY OF LIPASE: CPT

## 2019-09-23 PROCEDURE — 36415 COLL VENOUS BLD VENIPUNCTURE: CPT

## 2019-09-23 PROCEDURE — 94640 AIRWAY INHALATION TREATMENT: CPT

## 2019-09-23 PROCEDURE — 83735 ASSAY OF MAGNESIUM: CPT

## 2019-09-23 PROCEDURE — 25000003 PHARM REV CODE 250: Performed by: INTERNAL MEDICINE

## 2019-09-23 PROCEDURE — 25000242 PHARM REV CODE 250 ALT 637 W/ HCPCS: Performed by: INTERNAL MEDICINE

## 2019-09-23 PROCEDURE — 80048 BASIC METABOLIC PNL TOTAL CA: CPT

## 2019-09-23 PROCEDURE — 85027 COMPLETE CBC AUTOMATED: CPT

## 2019-09-23 PROCEDURE — 84100 ASSAY OF PHOSPHORUS: CPT

## 2019-09-23 PROCEDURE — 94761 N-INVAS EAR/PLS OXIMETRY MLT: CPT

## 2019-09-23 PROCEDURE — 27000221 HC OXYGEN, UP TO 24 HOURS

## 2019-09-23 PROCEDURE — 63600175 PHARM REV CODE 636 W HCPCS: Performed by: INTERNAL MEDICINE

## 2019-09-23 PROCEDURE — 12000002 HC ACUTE/MED SURGE SEMI-PRIVATE ROOM

## 2019-09-23 PROCEDURE — 97110 THERAPEUTIC EXERCISES: CPT

## 2019-09-23 PROCEDURE — 97535 SELF CARE MNGMENT TRAINING: CPT

## 2019-09-23 RX ORDER — BENZONATATE 100 MG/1
100 CAPSULE ORAL 3 TIMES DAILY PRN
Status: DISCONTINUED | OUTPATIENT
Start: 2019-09-23 | End: 2019-09-25 | Stop reason: HOSPADM

## 2019-09-23 RX ORDER — IPRATROPIUM BROMIDE AND ALBUTEROL SULFATE 2.5; .5 MG/3ML; MG/3ML
3 SOLUTION RESPIRATORY (INHALATION) EVERY 4 HOURS PRN
Status: DISCONTINUED | OUTPATIENT
Start: 2019-09-23 | End: 2019-09-25 | Stop reason: HOSPADM

## 2019-09-23 RX ORDER — IPRATROPIUM BROMIDE AND ALBUTEROL SULFATE 2.5; .5 MG/3ML; MG/3ML
3 SOLUTION RESPIRATORY (INHALATION) 2 TIMES DAILY
Status: DISCONTINUED | OUTPATIENT
Start: 2019-09-23 | End: 2019-09-23

## 2019-09-23 RX ADMIN — PANCRELIPASE 6 CAPSULE: 30000; 6000; 19000 CAPSULE, DELAYED RELEASE PELLETS ORAL at 01:09

## 2019-09-23 RX ADMIN — ATORVASTATIN CALCIUM 10 MG: 10 TABLET, FILM COATED ORAL at 08:09

## 2019-09-23 RX ADMIN — ONDANSETRON 4 MG: 2 INJECTION INTRAMUSCULAR; INTRAVENOUS at 10:09

## 2019-09-23 RX ADMIN — FLUTICASONE FUROATE AND VILANTEROL TRIFENATATE 1 PUFF: 100; 25 POWDER RESPIRATORY (INHALATION) at 07:09

## 2019-09-23 RX ADMIN — HYDROCODONE BITARTRATE AND ACETAMINOPHEN 1 TABLET: 5; 325 TABLET ORAL at 10:09

## 2019-09-23 RX ADMIN — CHOLESTYRAMINE 4 G: 4 POWDER, FOR SUSPENSION ORAL at 08:09

## 2019-09-23 RX ADMIN — PANCRELIPASE 6 CAPSULE: 30000; 6000; 19000 CAPSULE, DELAYED RELEASE PELLETS ORAL at 08:09

## 2019-09-23 RX ADMIN — Medication: at 10:09

## 2019-09-23 RX ADMIN — ENOXAPARIN SODIUM 40 MG: 100 INJECTION SUBCUTANEOUS at 05:09

## 2019-09-23 RX ADMIN — BENZONATATE 100 MG: 100 CAPSULE ORAL at 03:09

## 2019-09-23 RX ADMIN — METOPROLOL SUCCINATE 50 MG: 50 TABLET, FILM COATED, EXTENDED RELEASE ORAL at 10:09

## 2019-09-23 RX ADMIN — IPRATROPIUM BROMIDE AND ALBUTEROL SULFATE 3 ML: .5; 3 SOLUTION RESPIRATORY (INHALATION) at 07:09

## 2019-09-23 RX ADMIN — CHOLESTYRAMINE 4 G: 4 POWDER, FOR SUSPENSION ORAL at 03:09

## 2019-09-23 RX ADMIN — CHOLESTYRAMINE 4 G: 4 POWDER, FOR SUSPENSION ORAL at 10:09

## 2019-09-23 RX ADMIN — PANCRELIPASE 6 CAPSULE: 30000; 6000; 19000 CAPSULE, DELAYED RELEASE PELLETS ORAL at 05:09

## 2019-09-23 RX ADMIN — ASPIRIN 81 MG: 81 TABLET, COATED ORAL at 10:09

## 2019-09-23 RX ADMIN — PANTOPRAZOLE SODIUM 20 MG: 20 TABLET, DELAYED RELEASE ORAL at 06:09

## 2019-09-23 NOTE — PLAN OF CARE
Problem: Occupational Therapy Goal  Goal: Occupational Therapy Goal  Description  Goals to be met by: discharge    Patient will increase functional independence with ADLs by performing:    UE Dressing with Supervision.  LE Dressing with Supervision.  Grooming while standing at sink with Supervision.  Toileting from bedside commode with Supervision for hygiene and clothing management.   Toilet transfer to bedside commode with Supervision.     Outcome: Ongoing, Progressing

## 2019-09-23 NOTE — ASSESSMENT & PLAN NOTE
Reports chronic diarrhea about 1 year duration  History of colon cancer and cholecystectomy  Acute interval worsening with cramping abdominal pain  CT abdomen and pelvis reviewed - no acute pathology identified  Stool studies negative including C diff.  Status post colonoscopy 9/19 as outlined - follow biopsy   Continue Imodium q.i.d. (home medication) and cholestyramine dosing  GI consulted, recommended stool elastase/PCR, avoiding NSAID  MRA abd pending

## 2019-09-23 NOTE — PT/OT/SLP PROGRESS
Physical Therapy      Patient Name:  Marisol Kerr   MRN:  [id number]    Patient not seen today secondary to Patient fatigue. 1st attempt pt reported she did not sleep well last night. Second attempt pt reported nausea. Will follow-up 09/24/19.    Rossy Preciado, PT

## 2019-09-23 NOTE — NURSING
Uneventful hours, bed alarm set patient gets up to bedside commode without calling, re-instructed her to call for assist, verbalize understanding. O2 remains in progress, no SOB noted. NAD

## 2019-09-23 NOTE — PLAN OF CARE
Problem: Fall Injury Risk  Goal: Absence of Fall and Fall-Related Injury  Outcome: Ongoing, Progressing     Problem: Adult Inpatient Plan of Care  Goal: Plan of Care Review  Outcome: Ongoing, Progressing  Goal: Patient-Specific Goal (Individualization)  Outcome: Ongoing, Progressing  Goal: Absence of Hospital-Acquired Illness or Injury  Outcome: Ongoing, Progressing  Goal: Optimal Comfort and Wellbeing  Outcome: Ongoing, Progressing  Goal: Readiness for Transition of Care  Outcome: Ongoing, Progressing  Goal: Rounds/Family Conference  Outcome: Ongoing, Progressing     Problem: Diabetes Comorbidity  Goal: Blood Glucose Level Within Desired Range  Outcome: Ongoing, Progressing     Problem: Infection  Goal: Infection Symptom Resolution  Outcome: Ongoing, Progressing     Problem: Skin Injury Risk Increased  Goal: Skin Health and Integrity  Outcome: Ongoing, Progressing     Problem: Diarrhea  Goal: Fluid and Electrolyte Balance  Outcome: Ongoing, Progressing

## 2019-09-23 NOTE — PLAN OF CARE
Problem: Fall Injury Risk  Goal: Absence of Fall and Fall-Related Injury  9/23/2019 0638 by Vera Nunez LPN  Outcome: Ongoing, Progressing  9/23/2019 0620 by Vera Nunez LPN  Outcome: Ongoing, Progressing     Problem: Adult Inpatient Plan of Care  Goal: Plan of Care Review  9/23/2019 0638 by Vera Nunez LPN  Outcome: Ongoing, Progressing  9/23/2019 0620 by Vera Nunez LPN  Outcome: Ongoing, Progressing  Goal: Patient-Specific Goal (Individualization)  9/23/2019 0638 by Vera Nunez LPN  Outcome: Ongoing, Progressing  9/23/2019 0620 by Vera Nunez LPN  Outcome: Ongoing, Progressing  Goal: Absence of Hospital-Acquired Illness or Injury  9/23/2019 0638 by Vera Nunez LPN  Outcome: Ongoing, Progressing  9/23/2019 0620 by Vera Nunez LPN  Outcome: Ongoing, Progressing  Goal: Optimal Comfort and Wellbeing  9/23/2019 0638 by Vera Nunez LPN  Outcome: Ongoing, Progressing  9/23/2019 0620 by Vera Nunez LPN  Outcome: Ongoing, Progressing  Goal: Readiness for Transition of Care  9/23/2019 0638 by Vera Nunez LPN  Outcome: Ongoing, Progressing  9/23/2019 0620 by Vera Nunez LPN  Outcome: Ongoing, Progressing  Goal: Rounds/Family Conference  9/23/2019 0638 by Vera Nunez LPN  Outcome: Ongoing, Progressing  9/23/2019 0620 by Vera Nunez LPN  Outcome: Ongoing, Progressing     Problem: Diabetes Comorbidity  Goal: Blood Glucose Level Within Desired Range  9/23/2019 0638 by Vera Nunez LPN  Outcome: Ongoing, Progressing  9/23/2019 0620 by Vera Nunez LPN  Outcome: Ongoing, Progressing     Problem: Infection  Goal: Infection Symptom Resolution  9/23/2019 0638 by Vera Nunez LPN  Outcome: Ongoing, Progressing  9/23/2019 0620 by Vera Nunez LPN  Outcome: Ongoing, Progressing     Problem: Skin Injury Risk Increased  Goal: Skin Health and Integrity  9/23/2019 0638 by Vera Nunez LPN  Outcome: Ongoing, Progressing  9/23/2019 0620 by Vera Nunez LPN  Outcome: Ongoing, Progressing     Problem: Diarrhea  Goal: Fluid and  Electrolyte Balance  9/23/2019 0638 by Vera Nunez LPN  Outcome: Ongoing, Progressing  9/23/2019 0620 by Vera Nunez LPN  Outcome: Ongoing, Progressing

## 2019-09-23 NOTE — PLAN OF CARE
09/23/19 0729   Patient Assessment/Suction   Level of Consciousness (AVPU) alert   Respiratory Effort Unlabored;Normal   Expansion/Accessory Muscles/Retractions no use of accessory muscles;no retractions   All Lung Fields Breath Sounds clear   Rhythm/Pattern, Respiratory unlabored;pattern regular   Cough Frequency infrequent   Cough Type good   PRE-TX-O2   O2 Device (Oxygen Therapy) nasal cannula   $ Is the patient on Low Flow Oxygen? Yes   Flow (L/min) 3   SpO2 98 %   Pulse Oximetry Type Intermittent   $ Pulse Oximetry - Multiple Charge Pulse Oximetry - Multiple   Pulse 75   Resp 18   Aerosol Therapy   $ Aerosol Therapy Charges Aerosol Treatment   Daily Review of Necessity (SVN) completed   Respiratory Treatment Status (SVN) given   Treatment Route (SVN) mask   Patient Position (SVN) semi-Solorzano's   Post Treatment Assessment (SVN) increased aeration   Signs of Intolerance (SVN) none   Inhaler   $ Inhaler Charges MDI (Metered Dose Inahler) Treatment;Mouth rinsed post treatment   Daily Review of Necessity (Inhaler) completed   Respiratory Treatment Status (Inhaler) given   Treatment Route (Inhaler) mouthpiece   Patient Position (Inhaler) semi-Solorzano's   Post Treatment Assessment (Inhaler) increased aeration   Signs of Intolerance (Inhaler) none   Breath Sounds Post-Respiratory Treatment   Throughout All Fields Post-Treatment All Fields   Throughout All Fields Post-Treatment aeration increased   Post-treatment Heart Rate (beats/min) 76   Post-treatment Resp Rate (breaths/min) 18

## 2019-09-23 NOTE — PROGRESS NOTES
[facility]  Adult Nutrition  Progress Note    SUMMARY       Recommendations    Recommendation/Intervention: 1.) Continue diet as tolerated          2.) Add Glucerna BID (440 kcal, 20 g pro) to aid PO intake          3.)  to assist with meal choices daily  Goals: 1.) Pt to meet >75% estimated needs via diet and supplements  Nutrition Goal Status: new    Reason for Assessment    Reason For Assessment: length of stay  Diagnosis: other (see comments)(diarrhea)  Relevant Medical History: hx: DM, COPD, HTN, HLD, colon cancer on chemo  Interdisciplinary Rounds: attended    Nutrition Risk Screen    Nutrition Risk Screen: no indicators present    Nutrition/Diet History    Patient Reported Diet/Restrictions/Preferences: diabetic diet, heart healthy  Spiritual, Cultural Beliefs, Hindu Practices, Values that Affect Care: no  Food Allergies: other (see comments)(strawberry)  Factors Affecting Nutritional Intake: None identified at this time    Anthropometrics    Temp: 98 °F (36.7 °C)  Height Method: Stated  Height: 5' (152.4 cm)  Height (inches): 60 in  Weight Method: Standard Scale  Weight: 86.6 kg (191 lb)  Weight (lb): 191 lb  Ideal Body Weight (IBW), Female: 100 lb  % Ideal Body Weight, Female (lb): 191 lb  BMI (Calculated): 37.4  BMI Grade: 35 - 39.9 - obesity - grade II  Weight Loss: (wt stable PTA)       Lab/Procedures/Meds    Pertinent Labs Reviewed: reviewed    Lab Results   Component Value Date    WBC 7.89 09/23/2019    HGB 12.5 09/23/2019    HCT 39.1 09/23/2019    MCV 90 09/23/2019     09/23/2019     BMP  Lab Results   Component Value Date     09/23/2019    K 4.6 09/23/2019     09/23/2019    CO2 30 (H) 09/23/2019    BUN 21 09/23/2019    CREATININE 1.1 09/23/2019    CALCIUM 9.6 09/23/2019    ANIONGAP 7 (L) 09/23/2019    ESTGFRAFRICA 58.0 (A) 09/23/2019    EGFRNONAA 50.3 (A) 09/23/2019           Pertinent Medications Reviewed: reviewed    Scheduled Meds:    albuterol-ipratropium  3 mL Nebulization BID    aspirin  81 mg Oral QAM    atorvastatin  10 mg Oral Daily    balsam peru-castor oil   Topical (Top) Daily    cholestyramine-aspartame  4 g Oral TID    enoxaparin  40 mg Subcutaneous Daily    fluticasone furoate-vilanterol  1 puff Inhalation Daily    lipase-protease-amylase 6,000-19,000-30,000 units  6 capsule Oral QID (WM & HS)    metoprolol succinate  50 mg Oral QAM    pantoprazole  20 mg Oral QAM    umeclidinium  62.5 mcg Inhalation QAM    white petrolatum   Topical (Top) Daily       Estimated/Assessed Needs    Weight Used For Calorie Calculations: 86.6 kg (190 lb 14.7 oz)  Energy Calorie Requirements (kcal): 2781-6664 (20-25 kcal/kg)  Energy Need Method: Kcal/kg  Protein Requirements: 67 g (1.5 g/kg) per IBW  Weight Used For Protein Calculations: 45 kg (99 lb 3.3 oz)(IBW)  RDA Method (mL): 1732     Nutrition Prescription Ordered    Current Diet Order: cardiac    Evaluation of Received Nutrient/Fluid Intake    Energy Calories Required: not meeting needs  Protein Required: not meeting needs  Fluid Required: meeting needs  Tolerance: tolerating  % Meal Intake: Other: <50% meals per pt    Nutrition Risk    Level of Risk/Frequency of Follow-up: moderate - high     Assessment and Plan     Pt assessed 2' LOS. PO intake sub optimal, dislikes meal selections. Occasional nausea, no emesis. Diarrhea resolving per pt. LBM 9/22. Noted C diff negative. Offered supplement, pt accepted. Noted stage I to buttocks.      Monitor and Evaluation    Food and Nutrient Intake: food and beverage intake, energy intake  Food and Nutrient Adminstration: diet order  Physical Activity and Function: nutrition-related ADLs and IADLs  Anthropometric Measurements: weight change  Biochemical Data, Medical Tests and Procedures: gastrointestinal profile, electrolyte and renal panel, glucose/endocrine profile  Nutrition-Focused Physical Findings: overall appearance       Nutrition  Problem  Inadequate oral intake    Related to (etiology):   Dislike of meal selections    Signs and Symptoms (as evidenced by):   Reporting <50% PO intake of meals     Interventions/Recommendations (treatment strategy):  Add ONS (Glucerna BID) to aid PO intake    Nutrition Diagnosis Status:   New      Nutrition Follow-Up    RD Follow-up?: Yes    Jessenia Carter  09/23/2019  10:28 AM

## 2019-09-23 NOTE — PT/OT/SLP PROGRESS
Occupational Therapy   Treatment    Name: Marisol Kerr  MRN: [id number]  Admitting Diagnosis:  Diarrhea  4 Days Post-Op    Recommendations:     Discharge Recommendations: home with home health, home health PT, home health OT  Discharge Equipment Recommendations:  commode  Barriers to discharge:       Assessment:     Marisol Kerr is a 74 y.o. female with a medical diagnosis of Diarrhea. Pt agreeable to OT therapy session only after encouragement from therapist. Performance deficits affecting function are weakness, impaired endurance, impaired self care skills, impaired balance, gait instability, impaired functional mobilty, decreased safety awareness. Pt refused OOB activity 2/2 patient stating she was too tired because she did not get any sleep last night.    Rehab Prognosis:  Good; patient would benefit from acute skilled OT services to address these deficits and reach maximum level of function.       Plan:     Patient to be seen 5 x/week to address the above listed problems via self-care/home management, therapeutic activities, therapeutic exercises  · Plan of Care Expires: 10/18/19  · Plan of Care Reviewed with: patient    Subjective     Pain/Comfort:  ·      Objective:     Communicated with: nursing/PT prior to session.  Patient found seated EOB with peripheral IV, oxygen upon OT entry to room.    General Precautions: Standard, fall   Orthopedic Precautions:N/A   Braces:       Occupational Performance:     Activities of Daily Living:  · Feeding:  modified independence patient wears dentures  · Grooming: set up assistance seated EOB      Treatment & Education:   UE strengthening exercises with yellow theraband 2X10 reps horizontal abd/add, shoulder flex/abd; Ball squeezes 2X10 reps B hands focusing on  strengthening, all to maximize patient's ability to complete ADL's safely and independently. Pt educated on importance of out of bed ADL activity to negate effects of prolong bed rest.     Patient left seated EOB  with all lines intact and call button in reachEducation:      GOALS:   Multidisciplinary Problems     Occupational Therapy Goals        Problem: Occupational Therapy Goal    Goal Priority Disciplines Outcome Interventions   Occupational Therapy Goal     OT, PT/OT Ongoing, Progressing    Description:  Goals to be met by: discharge    Patient will increase functional independence with ADLs by performing:    UE Dressing with Supervision.  LE Dressing with Supervision.  Grooming while standing at sink with Supervision.  Toileting from bedside commode with Supervision for hygiene and clothing management.   Toilet transfer to bedside commode with Supervision.                      Time Tracking:     OT Date of Treatment: 09/23/19  OT Start Time: 1249  OT Stop Time: 1314  OT Total Time (min): 25 min    Billable Minutes:Self Care/Home Management 10  Therapeutic Exercise 15    Nani Lea OT  9/23/2019

## 2019-09-23 NOTE — SUBJECTIVE & OBJECTIVE
Interval History:  Seen and examined at bedside.  Continues to endorse diarrhea with intermittent cramping abdominal pain. Diarrhea is improving. Admits to intermittent nausea. No fever, chills, lightheadedness/dizziness or vomiting.    ROS is otherwise negative.  Objective:     Vital Signs (Most Recent):  Temp: 98.1 °F (36.7 °C) (09/23/19 1133)  Pulse: 75 (09/23/19 1133)  Resp: 18 (09/23/19 1133)  BP: (!) 147/64 (09/23/19 1133)  SpO2: 97 % (09/23/19 1133) Vital Signs (24h Range):  Temp:  [97.9 °F (36.6 °C)-98.2 °F (36.8 °C)] 98.1 °F (36.7 °C)  Pulse:  [61-75] 75  Resp:  [16-18] 18  SpO2:  [95 %-99 %] 97 %  BP: (147-160)/(64-74) 147/64     Weight: 86.6 kg (191 lb)  Body mass index is 37.3 kg/m².  No intake or output data in the 24 hours ending 09/23/19 1338   Physical Exam   Constitutional: She is oriented to person, place, and time. She appears well-developed and well-nourished. She is cooperative.  Non-toxic appearance. No distress.   HENT:   Head: Normocephalic and atraumatic.   Eyes: Pupils are equal, round, and reactive to light. Conjunctivae and lids are normal.   Neck: Trachea normal and full passive range of motion without pain. Neck supple. Normal carotid pulses present. No thyroid mass and no thyromegaly present.   Cardiovascular: Normal rate, regular rhythm, S1 normal, S2 normal, normal heart sounds and normal pulses.   Pulmonary/Chest: Effort normal and breath sounds normal. No stridor.   Abdominal: Soft. Normal appearance and bowel sounds are normal. She exhibits no distension. There is tenderness.   Musculoskeletal: She exhibits no edema or deformity.   Neurological: She is alert and oriented to person, place, and time. She has normal strength.   Able to move all extremities    Skin: Skin is warm, dry and intact. She is not diaphoretic. No cyanosis. Nails show no clubbing.   Psychiatric: She has a normal mood and affect. Her speech is normal and behavior is normal. Cognition and memory are normal.    Nursing note and vitals reviewed.      Significant Labs:   CBC:   Recent Labs   Lab 09/22/19  0440 09/23/19  0631   WBC 6.81 7.89   HGB 11.3* 12.5   HCT 35.9* 39.1   * 181     CMP:   Recent Labs   Lab 09/22/19  0440 09/23/19  0631    142   K 4.7 4.6    105   CO2 31* 30*   GLU 93 98   BUN 23 21   CREATININE 1.1 1.1   CALCIUM 9.2 9.6   ANIONGAP 9 7*   EGFRNONAA 50.3* 50.3*       Significant Imaging: I have reviewed all pertinent imaging results/findings within the past 24 hours.

## 2019-09-23 NOTE — PROGRESS NOTES
09/23/19 1008        Pressure Injury 09/17/19 1300 Buttocks Stage 1   Date First Assessed/Time First Assessed: 09/17/19 1300   Pressure Injury Present on Admission: yes  Location: Buttocks  Is this injury device related?: No  Staging: Stage 1   Wound Image   (non open injury)   Staging Stage 1   Dressing Appearance Intact   Drainage Amount None   Appearance Red   Periwound Area Intact;Other (see comments)  (patient states alot of pain when sitting, notified Dr West)   Wound Length (cm) 8 cm   Wound Width (cm) 4 cm   Wound Depth (cm)   (non open)   Wound Surface Area (cm^2) 32 cm^2   Dressing   (mepilex)   bilateral legs and feet are dry and peeling, has heel protectors at bedside, instructed to use.  Turned to the right side with pillow behind buttock.

## 2019-09-23 NOTE — CARE UPDATE
09/22/19 2108   Patient Assessment/Suction   Level of Consciousness (AVPU) alert   Respiratory Effort Unlabored   All Lung Fields Breath Sounds clear;wheezes, expiratory   Aerosol Therapy   $ Aerosol Therapy Charges PRN treatment not required

## 2019-09-24 LAB
ANION GAP SERPL CALC-SCNC: 5 MMOL/L (ref 8–16)
BUN SERPL-MCNC: 26 MG/DL (ref 8–23)
CALCIUM SERPL-MCNC: 9.6 MG/DL (ref 8.7–10.5)
CHLORIDE SERPL-SCNC: 106 MMOL/L (ref 95–110)
CO2 SERPL-SCNC: 31 MMOL/L (ref 23–29)
CREAT SERPL-MCNC: 1.2 MG/DL (ref 0.5–1.4)
ERYTHROCYTE [DISTWIDTH] IN BLOOD BY AUTOMATED COUNT: 12.5 % (ref 11.5–14.5)
EST. GFR  (AFRICAN AMERICAN): 52.2 ML/MIN/1.73 M^2
EST. GFR  (NON AFRICAN AMERICAN): 45.3 ML/MIN/1.73 M^2
GLUCOSE SERPL-MCNC: 101 MG/DL (ref 70–110)
GLUCOSE SERPL-MCNC: 107 MG/DL (ref 70–110)
GLUCOSE SERPL-MCNC: 115 MG/DL (ref 70–110)
GLUCOSE SERPL-MCNC: 118 MG/DL (ref 70–110)
GLUCOSE SERPL-MCNC: 123 MG/DL (ref 70–110)
GLUCOSE SERPL-MCNC: 95 MG/DL (ref 70–110)
HCT VFR BLD AUTO: 37.4 % (ref 37–48.5)
HGB BLD-MCNC: 11.7 G/DL (ref 12–16)
LIPASE SERPL-CCNC: 82 U/L (ref 4–60)
MAGNESIUM SERPL-MCNC: 1.6 MG/DL (ref 1.6–2.6)
MCH RBC QN AUTO: 28.3 PG (ref 27–31)
MCHC RBC AUTO-ENTMCNC: 31.3 G/DL (ref 32–36)
MCV RBC AUTO: 90 FL (ref 82–98)
PHOSPHATE SERPL-MCNC: 4.1 MG/DL (ref 2.7–4.5)
PLATELET # BLD AUTO: 182 K/UL (ref 150–350)
PMV BLD AUTO: 11.5 FL (ref 9.2–12.9)
POTASSIUM SERPL-SCNC: 4.9 MMOL/L (ref 3.5–5.1)
RBC # BLD AUTO: 4.14 M/UL (ref 4–5.4)
SODIUM SERPL-SCNC: 142 MMOL/L (ref 136–145)
WBC # BLD AUTO: 6.79 K/UL (ref 3.9–12.7)

## 2019-09-24 PROCEDURE — 94640 AIRWAY INHALATION TREATMENT: CPT

## 2019-09-24 PROCEDURE — 85027 COMPLETE CBC AUTOMATED: CPT

## 2019-09-24 PROCEDURE — 25000003 PHARM REV CODE 250: Performed by: INTERNAL MEDICINE

## 2019-09-24 PROCEDURE — 63600175 PHARM REV CODE 636 W HCPCS: Performed by: INTERNAL MEDICINE

## 2019-09-24 PROCEDURE — 36415 COLL VENOUS BLD VENIPUNCTURE: CPT

## 2019-09-24 PROCEDURE — 25000003 PHARM REV CODE 250: Performed by: STUDENT IN AN ORGANIZED HEALTH CARE EDUCATION/TRAINING PROGRAM

## 2019-09-24 PROCEDURE — 80048 BASIC METABOLIC PNL TOTAL CA: CPT

## 2019-09-24 PROCEDURE — 12000002 HC ACUTE/MED SURGE SEMI-PRIVATE ROOM

## 2019-09-24 PROCEDURE — 83690 ASSAY OF LIPASE: CPT

## 2019-09-24 PROCEDURE — 99900035 HC TECH TIME PER 15 MIN (STAT)

## 2019-09-24 PROCEDURE — 83735 ASSAY OF MAGNESIUM: CPT

## 2019-09-24 PROCEDURE — 84100 ASSAY OF PHOSPHORUS: CPT

## 2019-09-24 PROCEDURE — 27000221 HC OXYGEN, UP TO 24 HOURS

## 2019-09-24 PROCEDURE — 94761 N-INVAS EAR/PLS OXIMETRY MLT: CPT

## 2019-09-24 RX ORDER — BENAZEPRIL HYDROCHLORIDE 20 MG/1
40 TABLET ORAL DAILY
Status: DISCONTINUED | OUTPATIENT
Start: 2019-09-24 | End: 2019-09-25 | Stop reason: HOSPADM

## 2019-09-24 RX ORDER — HYDRALAZINE HYDROCHLORIDE 20 MG/ML
10 INJECTION INTRAMUSCULAR; INTRAVENOUS EVERY 8 HOURS PRN
Status: DISCONTINUED | OUTPATIENT
Start: 2019-09-24 | End: 2019-09-25 | Stop reason: HOSPADM

## 2019-09-24 RX ADMIN — ASPIRIN 81 MG: 81 TABLET, COATED ORAL at 09:09

## 2019-09-24 RX ADMIN — PANCRELIPASE 6 CAPSULE: 30000; 6000; 19000 CAPSULE, DELAYED RELEASE PELLETS ORAL at 08:09

## 2019-09-24 RX ADMIN — FLUTICASONE FUROATE AND VILANTEROL TRIFENATATE 1 PUFF: 100; 25 POWDER RESPIRATORY (INHALATION) at 07:09

## 2019-09-24 RX ADMIN — CHOLESTYRAMINE 4 G: 4 POWDER, FOR SUSPENSION ORAL at 03:09

## 2019-09-24 RX ADMIN — ATORVASTATIN CALCIUM 10 MG: 10 TABLET, FILM COATED ORAL at 08:09

## 2019-09-24 RX ADMIN — Medication: at 09:09

## 2019-09-24 RX ADMIN — CHOLESTYRAMINE 4 G: 4 POWDER, FOR SUSPENSION ORAL at 09:09

## 2019-09-24 RX ADMIN — PANTOPRAZOLE SODIUM 20 MG: 20 TABLET, DELAYED RELEASE ORAL at 05:09

## 2019-09-24 RX ADMIN — ENOXAPARIN SODIUM 40 MG: 100 INJECTION SUBCUTANEOUS at 05:09

## 2019-09-24 RX ADMIN — METOPROLOL SUCCINATE 50 MG: 50 TABLET, FILM COATED, EXTENDED RELEASE ORAL at 09:09

## 2019-09-24 RX ADMIN — PANCRELIPASE 6 CAPSULE: 30000; 6000; 19000 CAPSULE, DELAYED RELEASE PELLETS ORAL at 05:09

## 2019-09-24 RX ADMIN — BENAZEPRIL HYDROCHLORIDE 40 MG: 20 TABLET ORAL at 03:09

## 2019-09-24 RX ADMIN — CHOLESTYRAMINE 4 G: 4 POWDER, FOR SUSPENSION ORAL at 08:09

## 2019-09-24 RX ADMIN — PANCRELIPASE 6 CAPSULE: 30000; 6000; 19000 CAPSULE, DELAYED RELEASE PELLETS ORAL at 12:09

## 2019-09-24 RX ADMIN — CASTOR OIL AND BALSAM, PERU: 788; 87 OINTMENT TOPICAL at 09:09

## 2019-09-24 RX ADMIN — ONDANSETRON 4 MG: 2 INJECTION INTRAMUSCULAR; INTRAVENOUS at 08:09

## 2019-09-24 NOTE — PROGRESS NOTES
[facility]  Progress Note    Patient Name: Marisol Kerr  MRN: [id number]  Patient Class: IP- Inpatient   Admission Date: 9/16/2019  Length of Stay: 7 days  Attending Physician: Jackelyn Lee DO  Primary Care Provider: Khadar Dwyer MD    Subjective:     Principal Problem:Diarrhea    HPI:  74-year-old female history of COPD on 3 L oxygen or home, hypertension, hyperlipidemia, IDDM, S/P cholecystectomy, history colon cancer previously on chemotherapy who lives at home alone comes in for abdominal pain, nausea and vomiting, and diarrhea. Patient reports that since January after she had her last colonoscopy she is having chronic diarrhea that she improves with Imodium. Today she was in usual health about 8 PM the day before admission when she suddenly developed nausea with nonbloody nonbilious emesis while she was in the phone with a friend. Reports having 2 episodes of emesis. Patient then attempted to walk to the bathroom but had an episode of diarrhea before she reached the bathroom. She then came [place] and had another several episodes of nausea and vomiting associated with epigastric/chest pain. Describes chest pain as sharp radiating to her back type of pain. Denies any shortness of breath, diaphoresis. Patient's friend became concerned and brought patient to the ED for further evaluation. Reports drinking alcohol on holidays and has not had a drink in several months. In the ED, patient was found to have a lipase of 200. Initial troponin was negative and EKG was unremarkable.    Overview/Hospital Course:  Patient was admitted to the hospital for management of acute pancreatitis.  Antiemetics and as needed pain medications was ordered.  Her diet was slowly advanced, no recurrent nausea or emesis.  However she continued with cramping abdominal pain associated with multiple large volume diarrhea, acute on chronic.  She was seen by therapy with recommendations for home health  and patient was accepting of same.  Seen by wound care, stage I sacral decubitus with dry lower extremity, recommendations noted. CT abd/pelvis with acute pathology. Pending MRA abdomen.    Interval History:  Patient seen and examined at bedside.  She reports improvement to diarrhea.  2 BMs over last 24 hr.  Tolerating diet without nausea or vomiting.  Denies abdominal pain, chest pain, cough, shortness of breath.   Review of Systems   All other systems reviewed and are negative.       Objective:     Vital Signs (Most Recent):  Temp: 98.1 °F (36.7 °C) (09/24/19 1147)  Pulse: 64 (09/24/19 1147)  Resp: 18 (09/24/19 1147)  BP: (!) 188/70 (09/24/19 1147)  SpO2: 97 % (09/24/19 1147) Vital Signs (24h Range):  Temp:  [97.4 °F (36.3 °C)-98.7 °F (37.1 °C)] 98.1 °F (36.7 °C)  Pulse:  [57-77] 64  Resp:  [18-20] 18  SpO2:  [96 %-100 %] 97 %  BP: (133-188)/(58-76) 188/70     Weight: 86.6 kg (191 lb)  Body mass index is 37.3 kg/m².  No intake or output data in the 24 hours ending 09/24/19 1324   Physical Exam   Constitutional: She is oriented to person, place, and time. She appears well-developed and well-nourished. She is cooperative.  Non-toxic appearance. No distress.   HENT:   Head: Normocephalic and atraumatic.   Mouth/Throat: No oropharyngeal exudate.   Eyes: Pupils are equal, round, and reactive to light. Conjunctivae and lids are normal.   Neck: Trachea normal and full passive range of motion without pain. Neck supple. Normal carotid pulses present. No thyroid mass and no thyromegaly present.   Cardiovascular: Normal rate, regular rhythm, S1 normal, S2 normal, normal heart sounds and normal pulses.   Pulmonary/Chest: Effort normal and breath sounds normal. No stridor.   2L oxygen via NC   Abdominal: Soft. Normal appearance and bowel sounds are normal. She exhibits no distension. There is no tenderness.   Musculoskeletal: She exhibits no edema, tenderness or deformity.   Neurological: She is alert and oriented to person,  place, and time. She has normal strength. No sensory deficit.   Skin: Skin is warm, dry and intact. She is not diaphoretic. No cyanosis. Nails show no clubbing.   Psychiatric: She has a normal mood and affect. Her speech is normal and behavior is normal. Cognition and memory are normal.   Nursing note and vitals reviewed.      Significant Labs:   BMP:   Recent Labs   Lab 09/24/19  0544         K 4.9      CO2 31*   BUN 26*   CREATININE 1.2   CALCIUM 9.6   MG 1.6     CBC:   Recent Labs   Lab 09/23/19  0631 09/24/19  0544   WBC 7.89 6.79   HGB 12.5 11.7*   HCT 39.1 37.4    182     Elastase: 132 (low)    All pertinent labs within the past 24 hours have been reviewed.    Significant Imaging: I have reviewed all pertinent imaging results/findings within the past 24 hours.     Pathology  1.  RIGHT COLON, BIOPSY:  - ULCERATED COLONIC MUCOSA WITH FOCAL HISTOLOGIC CHANGES SUGGESTIVE OF   ISCHEMIA.      Comment:   The clinical history is noted.  No adenocarcinoma or   viropathic changes are identified.    2.  SIGMOID COLON, BIOPSY:  - COLONIC MUCOSA WITH NO SIGNIFICANT ARCHITECTURAL OR INFLAMMATORY   DISTURBANCE.    3.  RECTUM, BIOPSY:  - COLONIC MUCOSA WITH NO SIGNIFICANT ARCHITECTURAL OR INFLAMMATORY   DISTURBANCE.          Assessment/Plan:      * Acute on chronic diarrhea  History of colon cancer   Reports chronic diarrhea about 1 year duration  History of colon cancer and cholecystectomy  CT abdomen and pelvis reviewed - no acute pathology identified  Stool studies negative including C diff.  s/p colonoscopy 9/19 with bx:   Continue cholestyramine, holding immodium (home medications)   GI consulted, recommended stool elastase/PCR (low), avoiding NSAID  Bx with ischemic colitis  Pt unable to tolerated CT with contrast given allergy and unable to tolerate MRA given chronic COPD and CHF    Hypophosphatemia  Replete and monitor.      Decubitus ulcer of sacral region, stage 1  Pressure ulcer on  sacrum  wound care consulted, agree with plan     Chronic hypoxemic respiratory failure  COPD (chronic obstructive pulmonary disease)  On 2 L home oxygen (home regimen)  stable  No evidence of acute exacerbation    Class 2 obesity in adult  - diet and exercise discussed      CKD (chronic kidney disease), stage III  Renally  dose all medications and avoid nephrotoxin drugs.      Diabetes mellitus type 2, controlled  - controlled  - last a1c 5.3  - low dose SSI  - accuchecks      Essential hypertension, benign  Continue home medications and monitor.  Hydralazine p.r.n. SBP greater than 180        VTE Risk Mitigation (From admission, onward)         Ordered     enoxaparin injection 40 mg  Daily      09/20/19 1649     IP VTE HIGH RISK PATIENT  Once      09/17/19 7798                  Jackelyn Lee DO  Department of Hospital Medicine   [facility]

## 2019-09-24 NOTE — PT/OT/SLP PROGRESS
Occupational Therapy      Patient Name:  Marisol Kerr   MRN:  [id number]    Patient not seen today X 2 attempts secondary to Patient stating she was not feeling well and just wanted to rest. Will follow-up next service date.    Nani Lea OT  9/24/2019

## 2019-09-24 NOTE — PLAN OF CARE
09/24/19 0731   Patient Assessment/Suction   Level of Consciousness (AVPU) alert   Respiratory Effort Unlabored;Normal   Expansion/Accessory Muscles/Retractions no use of accessory muscles;no retractions   All Lung Fields Breath Sounds clear   Rhythm/Pattern, Respiratory pattern regular;unlabored   Cough Frequency infrequent   Cough Type good   PRE-TX-O2   O2 Device (Oxygen Therapy) nasal cannula   $ Is the patient on Low Flow Oxygen? Yes   Flow (L/min) 2   SpO2 100 %   Pulse Oximetry Type Intermittent   $ Pulse Oximetry - Multiple Charge Pulse Oximetry - Multiple   Pulse 76   Aerosol Therapy   $ Aerosol Therapy Charges PRN treatment not required   Inhaler   $ Inhaler Charges MDI (Metered Dose Inahler) Treatment;Mouth rinsed post treatment   Daily Review of Necessity (Inhaler) completed   Respiratory Treatment Status (Inhaler) given   Treatment Route (Inhaler) mouthpiece   Patient Position (Inhaler) semi-Solorzano's   Post Treatment Assessment (Inhaler) increased aeration   Signs of Intolerance (Inhaler) none   Breath Sounds Post-Respiratory Treatment   Throughout All Fields Post-Treatment All Fields   Throughout All Fields Post-Treatment aeration increased   Post-treatment Heart Rate (beats/min) 74   Post-treatment Resp Rate (breaths/min) 18

## 2019-09-24 NOTE — PLAN OF CARE
09/24/19 0305   Discharge Reassessment   Assessment Type Discharge Planning Reassessment   Anticipated Discharge Disposition Home-Health       I presented to patient's room with department approved HH list. After reviewing the list the patient chose [facility] to provide services. She signed Patient Choice form. Patient is currently still in hospital but may discharge tomorrow 9-25-19. At that time I will fax over to [facility] HH referral.

## 2019-09-24 NOTE — PLAN OF CARE
Plan of care discussed with patient. Questions pertaining to plan this shift answered. Pt verbally acknowledged understanding of plan. Outstanding diagnostic exams explained to patient, with a follow-up to MRI. Patient and family verbalized understanding of plan of care.    Problem: Fall Injury Risk  Goal: Absence of Fall and Fall-Related Injury  Outcome: Ongoing, Progressing     Problem: Adult Inpatient Plan of Care  Goal: Plan of Care Review  Outcome: Ongoing, Progressing  Goal: Patient-Specific Goal (Individualization)  Outcome: Ongoing, Progressing  Goal: Absence of Hospital-Acquired Illness or Injury  Outcome: Ongoing, Progressing  Goal: Optimal Comfort and Wellbeing  Outcome: Ongoing, Progressing  Goal: Readiness for Transition of Care  Outcome: Ongoing, Progressing  Goal: Rounds/Family Conference  Outcome: Ongoing, Progressing     Problem: Diabetes Comorbidity  Goal: Blood Glucose Level Within Desired Range  Outcome: Ongoing, Progressing     Problem: Infection  Goal: Infection Symptom Resolution  Outcome: Ongoing, Progressing     Problem: Skin Injury Risk Increased  Goal: Skin Health and Integrity  Outcome: Ongoing, Progressing     Problem: Diarrhea  Goal: Fluid and Electrolyte Balance  Outcome: Ongoing, Progressing     Problem: Oral Intake Inadequate  Goal: Improved Oral Intake  Outcome: Ongoing, Progressing

## 2019-09-24 NOTE — ASSESSMENT & PLAN NOTE
Reports chronic diarrhea about 1 year duration  History of colon cancer and cholecystectomy  CT abdomen and pelvis reviewed - no acute pathology identified  Stool studies negative including C diff.  s/p colonoscopy 9/19 with bx:   Continue cholestyramine, holding immodium (home medications)   GI consulted, recommended stool elastase/PCR (low), avoiding NSAID  Bx with ischemic colitis  Pt unable to tolerated CT with contrast given allergy and unable to tolerate MRA given chronic COPD and CHF

## 2019-09-24 NOTE — PROGRESS NOTES
[facility] Medicine  Progress Note    Patient Name: Marisol Kerr  MRN: [id number]  Patient Class: IP- Inpatient   Admission Date: 9/16/2019  Length of Stay: 6 days  Attending Physician: Per West MD  Primary Care Provider: Khadar Dwyer MD        Subjective:     Principal Problem:Diarrhea    Overview/Hospital Course:  Patient was admitted to the hospital for management of acute pancreatitis.  Antiemetics and as needed pain medications was ordered.  Her diet was slowly advanced, no recurrent nausea or emesis.  However she continued with cramping abdominal pain associated with multiple large volume diarrhea, acute on chronic.  She was seen by therapy with recommendations for home health and patient was accepting of same.  Seen by wound care, stage I sacral decubitus with dry lower extremity, recommendations noted. CT abd/pelvis with acute pathology. Pending MRA abdomen.    Interval History:  Seen and examined at bedside.  Continues to endorse diarrhea with intermittent cramping abdominal pain. Diarrhea is improving. Admits to intermittent nausea. No fever, chills, lightheadedness/dizziness or vomiting.    ROS is otherwise negative.  Objective:     Vital Signs (Most Recent):  Temp: 98.1 °F (36.7 °C) (09/23/19 1133)  Pulse: 75 (09/23/19 1133)  Resp: 18 (09/23/19 1133)  BP: (!) 147/64 (09/23/19 1133)  SpO2: 97 % (09/23/19 1133) Vital Signs (24h Range):  Temp:  [97.9 °F (36.6 °C)-98.2 °F (36.8 °C)] 98.1 °F (36.7 °C)  Pulse:  [61-75] 75  Resp:  [16-18] 18  SpO2:  [95 %-99 %] 97 %  BP: (147-160)/(64-74) 147/64     Weight: 86.6 kg (191 lb)  Body mass index is 37.3 kg/m².  No intake or output data in the 24 hours ending 09/23/19 1338   Physical Exam   Constitutional: She is oriented to person, place, and time. She appears well-developed and well-nourished. She is cooperative.  Non-toxic appearance. No distress.   HENT:   Head: Normocephalic and atraumatic.   Eyes: Pupils are equal, round, and  reactive to light. Conjunctivae and lids are normal.   Neck: Trachea normal and full passive range of motion without pain. Neck supple. Normal carotid pulses present. No thyroid mass and no thyromegaly present.   Cardiovascular: Normal rate, regular rhythm, S1 normal, S2 normal, normal heart sounds and normal pulses.   Pulmonary/Chest: Effort normal and breath sounds normal. No stridor.   Abdominal: Soft. Normal appearance and bowel sounds are normal. She exhibits no distension. There is tenderness.   Musculoskeletal: She exhibits no edema or deformity.   Neurological: She is alert and oriented to person, place, and time. She has normal strength.   Able to move all extremities    Skin: Skin is warm, dry and intact. She is not diaphoretic. No cyanosis. Nails show no clubbing.   Psychiatric: She has a normal mood and affect. Her speech is normal and behavior is normal. Cognition and memory are normal.   Nursing note and vitals reviewed.      Significant Labs:   CBC:   Recent Labs   Lab 09/22/19  0440 09/23/19  0631   WBC 6.81 7.89   HGB 11.3* 12.5   HCT 35.9* 39.1   * 181     CMP:   Recent Labs   Lab 09/22/19  0440 09/23/19  0631    142   K 4.7 4.6    105   CO2 31* 30*   GLU 93 98   BUN 23 21   CREATININE 1.1 1.1   CALCIUM 9.2 9.6   ANIONGAP 9 7*   EGFRNONAA 50.3* 50.3*       Significant Imaging: I have reviewed all pertinent imaging results/findings within the past 24 hours.      Assessment/Plan:      * Acute on chronic diarrhea  Reports chronic diarrhea about 1 year duration  History of colon cancer and cholecystectomy  Acute interval worsening with cramping abdominal pain  CT abdomen and pelvis reviewed - no acute pathology identified  Stool studies negative including C diff.  Status post colonoscopy 9/19 as outlined - follow biopsy   Continue Imodium q.i.d. (home medication) and cholestyramine dosing  GI consulted, recommended stool elastase/PCR, avoiding NSAID  MRA abd pending      Hypophosphatemia  Replete and monitor.      Bilateral nonobstructing nephrolithiasis        Decubitus ulcer of sacral region, stage 1  Pressure ulcer on sacrum,wound care as needed     Chronic hypoxemic respiratory failure  On 3 L home oxygen.      COPD (chronic obstructive pulmonary disease)  No evidence of acute exacerbation      CKD (chronic kidney disease), stage III  Renally  dose all medications and avoid nephrotoxin drugs.      Diabetes mellitus type 2, controlled        Essential hypertension, benign  Continue home medications and monitor.      VTE Risk Mitigation (From admission, onward)         Ordered     enoxaparin injection 40 mg  Daily      09/20/19 1647     IP VTE HIGH RISK PATIENT  Once      09/17/19 0234                      Per West MD  Department of Hospital Medicine   [facility]

## 2019-09-24 NOTE — PT/OT/SLP PROGRESS
"Physical Therapy      Patient Name:  Marisol Kerr   MRN:  [id number]    2 attempts to see Pt today. Patient not seen today secondary to Patient fatigue stating "I didn't get any sleep last night." Will follow-up tomorrow.    Hillary Hammant, PTA    "

## 2019-09-24 NOTE — SUBJECTIVE & OBJECTIVE
Interval History:  Patient seen and examined at bedside.  She reports improvement to diarrhea.  2 BMs over last 24 hr.  Tolerating diet without nausea or vomiting.  Denies abdominal pain, chest pain, cough, shortness of breath.   Review of Systems   All other systems reviewed and are negative.       Objective:     Vital Signs (Most Recent):  Temp: 98.1 °F (36.7 °C) (09/24/19 1147)  Pulse: 64 (09/24/19 1147)  Resp: 18 (09/24/19 1147)  BP: (!) 188/70 (09/24/19 1147)  SpO2: 97 % (09/24/19 1147) Vital Signs (24h Range):  Temp:  [97.4 °F (36.3 °C)-98.7 °F (37.1 °C)] 98.1 °F (36.7 °C)  Pulse:  [57-77] 64  Resp:  [18-20] 18  SpO2:  [96 %-100 %] 97 %  BP: (133-188)/(58-76) 188/70     Weight: 86.6 kg (191 lb)  Body mass index is 37.3 kg/m².  No intake or output data in the 24 hours ending 09/24/19 1324   Physical Exam   Constitutional: She is oriented to person, place, and time. She appears well-developed and well-nourished. She is cooperative.  Non-toxic appearance. No distress.   HENT:   Head: Normocephalic and atraumatic.   Mouth/Throat: No oropharyngeal exudate.   Eyes: Pupils are equal, round, and reactive to light. Conjunctivae and lids are normal.   Neck: Trachea normal and full passive range of motion without pain. Neck supple. Normal carotid pulses present. No thyroid mass and no thyromegaly present.   Cardiovascular: Normal rate, regular rhythm, S1 normal, S2 normal, normal heart sounds and normal pulses.   Pulmonary/Chest: Effort normal and breath sounds normal. No stridor.   2L oxygen via NC   Abdominal: Soft. Normal appearance and bowel sounds are normal. She exhibits no distension. There is no tenderness.   Musculoskeletal: She exhibits no edema, tenderness or deformity.   Neurological: She is alert and oriented to person, place, and time. She has normal strength. No sensory deficit.   Skin: Skin is warm, dry and intact. She is not diaphoretic. No cyanosis. Nails show no clubbing.   Psychiatric: She has a  normal mood and affect. Her speech is normal and behavior is normal. Cognition and memory are normal.   Nursing note and vitals reviewed.      Significant Labs:   BMP:   Recent Labs   Lab 09/24/19  0544         K 4.9      CO2 31*   BUN 26*   CREATININE 1.2   CALCIUM 9.6   MG 1.6     CBC:   Recent Labs   Lab 09/23/19  0631 09/24/19  0544   WBC 7.89 6.79   HGB 12.5 11.7*   HCT 39.1 37.4    182     Elastase: 132 (low)    All pertinent labs within the past 24 hours have been reviewed.    Significant Imaging: I have reviewed all pertinent imaging results/findings within the past 24 hours.     Pathology  1.  RIGHT COLON, BIOPSY:  - ULCERATED COLONIC MUCOSA WITH FOCAL HISTOLOGIC CHANGES SUGGESTIVE OF   ISCHEMIA.      Comment:   The clinical history is noted.  No adenocarcinoma or   viropathic changes are identified.    2.  SIGMOID COLON, BIOPSY:  - COLONIC MUCOSA WITH NO SIGNIFICANT ARCHITECTURAL OR INFLAMMATORY   DISTURBANCE.    3.  RECTUM, BIOPSY:  - COLONIC MUCOSA WITH NO SIGNIFICANT ARCHITECTURAL OR INFLAMMATORY   DISTURBANCE.

## 2019-09-25 VITALS
HEIGHT: 60 IN | TEMPERATURE: 98 F | OXYGEN SATURATION: 100 % | WEIGHT: 191 LBS | BODY MASS INDEX: 37.5 KG/M2 | DIASTOLIC BLOOD PRESSURE: 65 MMHG | HEART RATE: 61 BPM | SYSTOLIC BLOOD PRESSURE: 145 MMHG | RESPIRATION RATE: 18 BRPM

## 2019-09-25 LAB
ANION GAP SERPL CALC-SCNC: 7 MMOL/L (ref 8–16)
BUN SERPL-MCNC: 36 MG/DL (ref 8–23)
CALCIUM SERPL-MCNC: 9.7 MG/DL (ref 8.7–10.5)
CHLORIDE SERPL-SCNC: 103 MMOL/L (ref 95–110)
CO2 SERPL-SCNC: 30 MMOL/L (ref 23–29)
CREAT SERPL-MCNC: 1.3 MG/DL (ref 0.5–1.4)
ERYTHROCYTE [DISTWIDTH] IN BLOOD BY AUTOMATED COUNT: 12.6 % (ref 11.5–14.5)
EST. GFR  (AFRICAN AMERICAN): 47.4 ML/MIN/1.73 M^2
EST. GFR  (NON AFRICAN AMERICAN): 41.1 ML/MIN/1.73 M^2
GLUCOSE SERPL-MCNC: 98 MG/DL (ref 70–110)
HCT VFR BLD AUTO: 37.8 % (ref 37–48.5)
HGB BLD-MCNC: 11.8 G/DL (ref 12–16)
LIPASE SERPL-CCNC: 90 U/L (ref 4–60)
MAGNESIUM SERPL-MCNC: 1.5 MG/DL (ref 1.6–2.6)
MCH RBC QN AUTO: 28 PG (ref 27–31)
MCHC RBC AUTO-ENTMCNC: 31.2 G/DL (ref 32–36)
MCV RBC AUTO: 90 FL (ref 82–98)
PHOSPHATE SERPL-MCNC: 3.7 MG/DL (ref 2.7–4.5)
PLATELET # BLD AUTO: 217 K/UL (ref 150–350)
PMV BLD AUTO: 11.9 FL (ref 9.2–12.9)
POTASSIUM SERPL-SCNC: 5 MMOL/L (ref 3.5–5.1)
RBC # BLD AUTO: 4.21 M/UL (ref 4–5.4)
SODIUM SERPL-SCNC: 140 MMOL/L (ref 136–145)
WBC # BLD AUTO: 9.95 K/UL (ref 3.9–12.7)

## 2019-09-25 PROCEDURE — 97110 THERAPEUTIC EXERCISES: CPT

## 2019-09-25 PROCEDURE — 25000003 PHARM REV CODE 250: Performed by: INTERNAL MEDICINE

## 2019-09-25 PROCEDURE — 83735 ASSAY OF MAGNESIUM: CPT

## 2019-09-25 PROCEDURE — 84100 ASSAY OF PHOSPHORUS: CPT

## 2019-09-25 PROCEDURE — 27000221 HC OXYGEN, UP TO 24 HOURS

## 2019-09-25 PROCEDURE — 36415 COLL VENOUS BLD VENIPUNCTURE: CPT

## 2019-09-25 PROCEDURE — 83690 ASSAY OF LIPASE: CPT

## 2019-09-25 PROCEDURE — 25000003 PHARM REV CODE 250: Performed by: STUDENT IN AN ORGANIZED HEALTH CARE EDUCATION/TRAINING PROGRAM

## 2019-09-25 PROCEDURE — 80048 BASIC METABOLIC PNL TOTAL CA: CPT

## 2019-09-25 PROCEDURE — 97535 SELF CARE MNGMENT TRAINING: CPT

## 2019-09-25 PROCEDURE — 94761 N-INVAS EAR/PLS OXIMETRY MLT: CPT

## 2019-09-25 PROCEDURE — 94640 AIRWAY INHALATION TREATMENT: CPT

## 2019-09-25 PROCEDURE — 85027 COMPLETE CBC AUTOMATED: CPT

## 2019-09-25 RX ORDER — LOPERAMIDE HYDROCHLORIDE 2 MG/1
2 CAPSULE ORAL 4 TIMES DAILY PRN
Status: DISCONTINUED | OUTPATIENT
Start: 2019-09-25 | End: 2019-09-25 | Stop reason: HOSPADM

## 2019-09-25 RX ORDER — CHOLESTYRAMINE 4 G/4.8G
4 POWDER, FOR SUSPENSION ORAL 3 TIMES DAILY
Qty: 270 PACKET | Refills: 3 | Status: SHIPPED | OUTPATIENT
Start: 2019-09-25 | End: 2020-06-15 | Stop reason: SDUPTHER

## 2019-09-25 RX ORDER — BALSAM PERU/CASTOR OIL
OINTMENT (GRAM) TOPICAL DAILY
Qty: 30 G | Refills: 1 | Status: SHIPPED | OUTPATIENT
Start: 2019-09-26 | End: 2019-09-26

## 2019-09-25 RX ADMIN — METOPROLOL SUCCINATE 50 MG: 50 TABLET, FILM COATED, EXTENDED RELEASE ORAL at 08:09

## 2019-09-25 RX ADMIN — ASPIRIN 81 MG: 81 TABLET, COATED ORAL at 08:09

## 2019-09-25 RX ADMIN — PANTOPRAZOLE SODIUM 20 MG: 20 TABLET, DELAYED RELEASE ORAL at 05:09

## 2019-09-25 RX ADMIN — FLUTICASONE FUROATE AND VILANTEROL TRIFENATATE 1 PUFF: 100; 25 POWDER RESPIRATORY (INHALATION) at 07:09

## 2019-09-25 RX ADMIN — CHOLESTYRAMINE 4 G: 4 POWDER, FOR SUSPENSION ORAL at 08:09

## 2019-09-25 RX ADMIN — CASTOR OIL AND BALSAM, PERU: 788; 87 OINTMENT TOPICAL at 09:09

## 2019-09-25 RX ADMIN — BENAZEPRIL HYDROCHLORIDE 40 MG: 20 TABLET ORAL at 08:09

## 2019-09-25 RX ADMIN — Medication: at 08:09

## 2019-09-25 RX ADMIN — PANCRELIPASE 6 CAPSULE: 30000; 6000; 19000 CAPSULE, DELAYED RELEASE PELLETS ORAL at 08:09

## 2019-09-25 NOTE — PLAN OF CARE
09/25/19 0754   Patient Assessment/Suction   Level of Consciousness (AVPU) alert   Respiratory Effort Normal;Unlabored   Expansion/Accessory Muscles/Retractions no use of accessory muscles;no retractions   All Lung Fields Breath Sounds clear;diminished   Rhythm/Pattern, Respiratory unlabored;pattern regular;depth regular;no shortness of breath reported   PRE-TX-O2   O2 Device (Oxygen Therapy) nasal cannula   $ Is the patient on Low Flow Oxygen? Yes   Flow (L/min) 2   SpO2 99 %   Pulse Oximetry Type Intermittent   $ Pulse Oximetry - Multiple Charge Pulse Oximetry - Multiple   Pulse 61   Resp 16   Inhaler   $ Inhaler Charges MDI (Metered Dose Inahler) Treatment;Mouth rinsed post treatment   Respiratory Treatment Status (Inhaler) given   Treatment Route (Inhaler) mouthpiece   Patient Position (Inhaler) semi-Solorzano's   Post Treatment Assessment (Inhaler) breath sounds unchanged   Signs of Intolerance (Inhaler) none

## 2019-09-25 NOTE — PLAN OF CARE
09/24/19 2200   Patient Assessment/Suction   Level of Consciousness (AVPU) alert   Respiratory Effort Normal;Unlabored   Expansion/Accessory Muscles/Retractions diaphragmatic;expansion symmetric;no use of accessory muscles   All Lung Fields Breath Sounds diminished;clear   PRE-TX-O2   O2 Device (Oxygen Therapy) nasal cannula   $ Is the patient on Low Flow Oxygen? Yes   Flow (L/min) 2   SpO2 95 %   Pulse Oximetry Type Intermittent   Pulse 65   Resp 20   Aerosol Therapy   $ Aerosol Therapy Charges PRN treatment not required   Respiratory Interventions   Cough And Deep Breathing done with encouragement   Breathing Techniques/Airway Clearance deep/controlled cough encouraged;diaphragmatic breathing promoted

## 2019-09-25 NOTE — DISCHARGE SUMMARY
[facility] Medicine  Discharge Summary      Patient Name: Marisol Kerr  MRN: [id number]  Admission Date: 9/16/2019  Hospital Length of Stay: 8 days  Discharge Date and Time: 9/25/2019 11:52 AM  Attending Physician: Jackelyn Lee DO   Discharging Provider: Jackelyn Lee DO  Primary Care Provider: Khadar Dwyer MD      HPI:   74-year-old female history of COPD on 3 L oxygen or home, hypertension, hyperlipidemia, IDDM, S/P cholecystectomy, history colon cancer previously on chemotherapy who lives at home alone comes in for abdominal pain, nausea and vomiting, and diarrhea. Patient reports that since January after she had her last colonoscopy she is having chronic diarrhea that she improves with Imodium. Today she was in usual health about 8 PM the day before admission when she suddenly developed nausea with nonbloody nonbilious emesis while she was in the phone with a friend. Reports having 2 episodes of emesis. Patient then attempted to walk to the bathroom but had an episode of diarrhea before she reached the bathroom. She then [place] and had another several episodes of nausea and vomiting associated with epigastric/chest pain. Describes chest pain as sharp radiating to her back type of pain. Denies any shortness of breath, diaphoresis. Patient's friend became concerned and brought patient to the ED for further evaluation. Reports drinking alcohol on holidays and has not had a drink in several months. In the ED, patient was found to have a lipase of 200. Initial troponin was negative and EKG was unremarkable.    Procedure(s) (LRB):  SIGMOIDOSCOPY, FLEXIBLE (N/A)      Hospital Course:   Patient was admitted to the hospital for management of acute pancreatitis.  Antiemetics and as needed pain medications was ordered.  Her diet was slowly advanced, no recurrent nausea or emesis.  However she continued with cramping abdominal pain associated with multiple large volume diarrhea,  acute on chronic.  She was seen by therapy with recommendations for home health and patient was accepting of same.  Seen by wound care, stage I sacral decubitus with dry lower extremity. GI consulted, colonoscopy performed, recommendation to avoid nsaids, increase questran. Pt reported improvement to diarrhea symptoms and was requesting discharge. Seen and examined on day of discharge, tolerating diet without n/v, BM at baseline,  Hemodynamically stable and appropriate for discharge.    Gen: nad, afvss  CV: rrr no mrg  Resp: CTA   AB: +bs soft ntnd  Skin: dry, warm    Consults:   Consults (From admission, onward)        Status Ordering Provider     Inpatient consult to Gastroenterology  Once     Provider:  Aarti Alvarez MD    Completed VILMA TRACY     Inpatient consult to   Once     Provider:  (Not yet assigned)    Acknowledged VILMA TRACY          No new Assessment & Plan notes have been filed under this hospital service since the last note was generated.  Service: Hospital Medicine    Final Active Diagnoses:    Diagnosis Date Noted POA    PRINCIPAL PROBLEM:  Acute on chronic diarrhea [R19.7] 09/18/2019 Yes    Hypophosphatemia [E83.39] 09/19/2019 Yes    Decubitus ulcer of sacral region, stage 1 [L89.151] 09/18/2019 Yes    Bilateral nonobstructing nephrolithiasis [N20.0] 09/18/2019 Yes     Chronic    COPD (chronic obstructive pulmonary disease) [J44.9] 01/16/2019 Yes    Chronic hypoxemic respiratory failure [J96.11] 01/16/2019 Yes    History of colon cancer [Z85.038] 10/27/2016 Yes    Class 2 obesity in adult [E66.9] 05/18/2015 Yes    CKD (chronic kidney disease), stage III [N18.3] 10/08/2014 Yes    Diabetes mellitus type 2, controlled [E11.9] 06/03/2013 Yes    Essential hypertension, benign [I10] 04/09/2013 Yes    Coronary atherosclerosis [I25.10] 04/09/2013 Yes      Problems Resolved During this Admission:    Diagnosis Date Noted Date Resolved POA    Leukocytosis  [D72.829] 09/18/2019 09/18/2019 Yes    ISSA (acute kidney injury) [N17.9] 09/18/2019 09/18/2019 Yes    Idiopathic acute pancreatitis without infection or necrosis [K85.00] 09/17/2019 09/18/2019 Yes    History of cholecystectomy [Z90.49] 09/17/2019 09/23/2019 Not Applicable     Chronic    Chest pain [R07.9] 09/17/2019 09/19/2019 Yes       Discharged Condition: stable    Disposition: Home or Self Care    Follow Up:  Follow-up Information     Aarti Alvarez MD.    Specialty:  Gastroenterology  Contact information:  [address]   [place] [address]             Khadar Dwyer MD.    Specialty:  Family Medicine  Contact information:  [address]  [address]  [facility]  [place] [address]  [phone]                 Patient Instructions:      COMMODE FOR HOME USE     Order Specific Question Answer Comments   Type: Heavy duty    Height: 5' (1.524 m)    Weight: 86.6 kg (191 lb)    Does patient have medical equipment at home? shower chair dressing stick, reacher / dressing stick, reacher / dressing stick, reacher / dressing stick, reacher / dressing stick, reacher   Does patient have medical equipment at home? rollator    Does patient have medical equipment at home? cane, quad    Does patient have medical equipment at home? wheelchair    Does patient have medical equipment at home? grab bar    Length of need (1-99 months): 99      Referral to Home health   Referral Priority: Routine Referral Type: Home Health   Referral Reason: Specialty Services Required   Requested Specialty: Home Health Services   Number of Visits Requested: 1     Diet diabetic     Diet Cardiac     Notify your health care provider if you experience any of the following:  persistent nausea and vomiting or diarrhea     Notify your health care provider if you experience any of the following:  severe uncontrolled pain     Notify your health care provider if you experience any of the following:  redness, tenderness, or  signs of infection (pain, swelling, redness, odor or green/yellow discharge around incision site)     Notify your health care provider if you experience any of the following:  severe persistent headache     Notify your health care provider if you experience any of the following:  persistent dizziness, light-headedness, or visual disturbances     Notify your health care provider if you experience any of the following:  increased confusion or weakness     Activity as tolerated       Significant Diagnostic Studies: Labs:   CBC   Recent Labs   Lab 09/24/19  0544 09/25/19  0529   WBC 6.79 9.95   HGB 11.7* 11.8*   HCT 37.4 37.8    217       Medications:  Reconciled Home Medications:      Medication List      START taking these medications    balsam peru-castor oil Oint  Apply topically once daily. Bilat butock Clean with chlorhexidine/ns  pat dry  apply venelex and cover with mepilex daily  Start taking on:  September 26, 2019     cholestyramine-aspartame 4 gram Pwpk  Commonly known as:  QUESTRAN LIGHT  Take 1 packet (4 g total) by mouth 3 (three) times daily.        CHANGE how you take these medications    * VENTOLIN HFA 90 mcg/actuation inhaler  Generic drug:  albuterol  Inhale 2 puffs into the lungs every 6 (six) hours as needed for Wheezing or Shortness of Breath. Rescue  What changed:  Another medication with the same name was changed. Make sure you understand how and when to take each.     * albuterol 2.5 mg /3 mL (0.083 %) nebulizer solution  Commonly known as:  PROVENTIL  USE ONE VIAL IN NEBULIZER EVERY 6 HOURS AS NEEDED FOR WHEEZING  What changed:    · how much to take  · how to take this  · when to take this  · reasons to take this     benazepril 40 MG tablet  Commonly known as:  LOTENSIN  Take 1 tablet (40 mg total) by mouth once daily.  What changed:  when to take this     ergocalciferol 50,000 unit Cap  Commonly known as:  VITAMIN D2  Take 1 capsule (50,000 Units total) by mouth every 7 days.  What  changed:  additional instructions     fluticasone propionate 50 mcg/actuation nasal spray  Commonly known as:  FLONASE  2 sprays (100 mcg total) by Each Nare route once daily.  What changed:    · when to take this  · reasons to take this     ipratropium-albuterol  mcg/actuation inhaler  Commonly known as:  COMBIVENT RESPIMAT  Inhale 2 puffs into the lungs every 4 (four) hours as needed. Rescue  What changed:  reasons to take this     pantoprazole 20 MG tablet  Commonly known as:  PROTONIX  Take 1 tablet (20 mg total) by mouth once daily.  What changed:  when to take this         * This list has 2 medication(s) that are the same as other medications prescribed for you. Read the directions carefully, and ask your doctor or other care provider to review them with you.            CONTINUE taking these medications    acetaminophen 500 MG tablet  Commonly known as:  TYLENOL  Take 500 mg by mouth every 6 (six) hours as needed for Pain.     ARTIFICIAL TEARS(QUVJ09-YWPOD) 0.1-0.3 % Drop  Generic drug:  dextran 70-hypromellose  Place 1 drop into both eyes daily as needed.     aspirin 81 MG EC tablet  Commonly known as:  ECOTRIN  Take 81 mg by mouth every morning.     coenzyme Q10 100 mg capsule  Take 100 mg by mouth every morning.     CREON 36,000-114,000- 180,000 unit Cpdr  Generic drug:  lipase-protease-amylase  Take 1 capsule by mouth 4 (four) times daily.     fish oil-omega-3 fatty acids 300-1,000 mg capsule  Take 1 capsule by mouth every morning.     FLAXSEED OIL ORAL  Take 1,200 mg by mouth every morning.     fluticasone-salmeterol 250-50 mcg/dose 250-50 mcg/dose diskus inhaler  Commonly known as:  ADVAIR DISKUS  Inhale 1 puff into the lungs 2 (two) times daily.     INCRUSE ELLIPTA 62.5 mcg/actuation Dsdv  Generic drug:  umeclidinium  Inhale 62.5 mcg into the lungs every morning. Controller     loperamide 2 mg capsule  Commonly known as:  IMODIUM  Take 1 capsule (2 mg total) by mouth 4 (four) times daily.      lovastatin 20 MG tablet  Commonly known as:  MEVACOR  Take 20 mg by mouth every evening.     metFORMIN 500 MG tablet  Commonly known as:  GLUCOPHAGE  Take 500 mg by mouth daily with breakfast.     metoprolol succinate 50 MG 24 hr tablet  Commonly known as:  TOPROL-XL  Take 50 mg by mouth every morning.     * nitroGLYCERIN 0.2 mg/hr TD PT24 0.2 mg/hr  Commonly known as:  NITRODUR  Place 1 patch onto the skin every morning.     * nitroGLYCERIN 0.4 MG/DOSE TL SPRY 400 mcg/spray spray  Commonly known as:  NITROLINGUAL  Place 1 spray under the tongue every 5 (five) minutes as needed for Chest pain (DO NOT EXCEED A TOTAL OF 3 SPRAYS IN 15 MINUTES).     ondansetron 4 MG Tbdl  Commonly known as:  ZOFRAN-ODT  Take 4 mg by mouth every 6 (six) hours as needed.     PRENATAL #115-IRON-FOLIC ACID ORAL  Take 1 tablet by mouth every morning.     PRESERVISION AREDS-2 ORAL  Take 1 capsule by mouth every morning.     vitamin D 1000 units Tab  Commonly known as:  VITAMIN D3  Take 1,000 Units by mouth every morning.         * This list has 2 medication(s) that are the same as other medications prescribed for you. Read the directions carefully, and ask your doctor or other care provider to review them with you.            ASK your doctor about these medications    furosemide 20 MG tablet  Commonly known as:  LASIX  Take 20 mg by mouth every morning.     meloxicam 7.5 MG tablet  Commonly known as:  MOBIC  Take 1 tablet (7.5 mg total) by mouth once daily.            Indwelling Lines/Drains at time of discharge:   Lines/Drains/Airways     Pressure Ulcer                 Pressure Injury 09/17/19 1300 Buttocks Stage 1 7 days                Time spent on the discharge of patient: 35 minutes  Patient was seen and examined on the date of discharge and determined to be suitable for discharge.         Jackelyn Lee DO  Department of Hospital Medicine  [facility]

## 2019-09-25 NOTE — PLAN OF CARE
09/25/19 0946   Final Note   Assessment Type Final Discharge Note   Anticipated Discharge Disposition Home-Health        referral sent to [facility]. Todd with [facility] confirmed he received referral and will be admitting patient. Patient choice form signed by patient and scanned in chart.

## 2019-09-25 NOTE — PT/OT/SLP PROGRESS
Occupational Therapy   Treatment    Name: Marisol Kerr  MRN: [id number]  Admitting Diagnosis:  Diarrhea  6 Days Post-Op    Recommendations:     Discharge Recommendations: home with home health, home health PT, home health OT  Discharge Equipment Recommendations:  commode  Barriers to discharge:       Assessment:     Marisol Kerr is a 74 y.o. female with a medical diagnosis of Diarrhea.  Pt agreeable to OT therapy session this AM. Performance deficits affecting function are weakness, impaired endurance, impaired self care skills, impaired balance, impaired functional mobilty. Pt states she is excited to leave and go home to get some good rest.    Rehab Prognosis:  Good; patient would benefit from acute skilled OT services to address these deficits and reach maximum level of function.       Plan:     Patient to be seen 5 x/week to address the above listed problems via self-care/home management, therapeutic activities, therapeutic exercises  · Plan of Care Expires: 10/18/19  · Plan of Care Reviewed with: patient    Subjective     Pain/Comfort:  · Pain Rating 1: 0/10    Objective:     Communicated with: nursing prior to session.  Patient found HOB elevated with oxygen upon OT entry to room.    General Precautions: Standard, fall   Orthopedic Precautions:N/A   Braces:       Occupational Performance:     Bed Mobility:    · Patient completed Supine to Sit with contact guard assistance  · Patient completed Sit to Supine with minimum assistance     Functional Mobility/Transfers:  · Patient completed Sit <> Stand Transfer with contact guard assistance  with  rolling walker   · Patient completed Toilet Transfer Stand Pivot technique with contact guard assistance with  rolling walker    Activities of Daily Living:  · Lower Body Dressing: supervision to don slippers  · Toileting: stand by assistance while seated on BSC    Treatment & Education:  UE exercises with yellow theraband, 2X10 reps; Pt educated on safety techniques with  transfers and with use of walker, and importance of OOB ADL activity to negate the effects of prolonged bed rest.    Patient left HOB elevated with all lines intact, call button in reach, nursing notified and daughter presentEducation:      GOALS:   Multidisciplinary Problems     Occupational Therapy Goals     Not on file          Multidisciplinary Problems (Resolved)        Problem: Occupational Therapy Goal    Goal Priority Disciplines Outcome Interventions   Occupational Therapy Goal   (Resolved)     OT, PT/OT Met    Description:  Goals to be met by: discharge    Patient will increase functional independence with ADLs by performing:    UE Dressing with Supervision.  LE Dressing with Supervision.  Grooming while standing at sink with Supervision.  Toileting from bedside commode with Supervision for hygiene and clothing management.   Toilet transfer to bedside commode with Supervision.                      Time Tracking:     OT Date of Treatment: 09/25/19  OT Start Time: 1012  OT Stop Time: 1046  OT Total Time (min): 34 min    Billable Minutes:Self Care/Home Management 19  Therapeutic Exercise 15    Nani Lea OT  9/25/2019

## 2019-09-25 NOTE — PLAN OF CARE
Problem: Fall Injury Risk  Goal: Absence of Fall and Fall-Related Injury  Outcome: Ongoing, Progressing     Problem: Adult Inpatient Plan of Care  Goal: Plan of Care Review  Outcome: Ongoing, Progressing     Problem: Adult Inpatient Plan of Care  Goal: Absence of Hospital-Acquired Illness or Injury  Outcome: Ongoing, Progressing     Problem: Adult Inpatient Plan of Care  Goal: Optimal Comfort and Wellbeing  Outcome: Ongoing, Progressing     Problem: Adult Inpatient Plan of Care  Goal: Rounds/Family Conference  Outcome: Ongoing, Progressing     Problem: Adult Inpatient Plan of Care  Goal: Readiness for Transition of Care  Outcome: Ongoing, Progressing     Problem: Diabetes Comorbidity  Goal: Blood Glucose Level Within Desired Range  Outcome: Ongoing, Progressing     Problem: Infection  Goal: Infection Symptom Resolution  Outcome: Ongoing, Progressing     Problem: Skin Injury Risk Increased  Goal: Skin Health and Integrity  Outcome: Ongoing, Progressing     Problem: Diarrhea  Goal: Fluid and Electrolyte Balance  Outcome: Ongoing, Progressing

## 2019-09-26 ENCOUNTER — OFFICE VISIT (OUTPATIENT)
Dept: FAMILY MEDICINE | Facility: CLINIC | Age: 74
End: 2019-09-26
Payer: MEDICARE

## 2019-09-26 VITALS
BODY MASS INDEX: 35.53 KG/M2 | TEMPERATURE: 98 F | WEIGHT: 181 LBS | SYSTOLIC BLOOD PRESSURE: 124 MMHG | HEIGHT: 60 IN | HEART RATE: 72 BPM | DIASTOLIC BLOOD PRESSURE: 60 MMHG

## 2019-09-26 DIAGNOSIS — K52.9 ACUTE COLITIS: Primary | ICD-10-CM

## 2019-09-26 DIAGNOSIS — F51.01 PRIMARY INSOMNIA: ICD-10-CM

## 2019-09-26 DIAGNOSIS — I10 ESSENTIAL HYPERTENSION, BENIGN: ICD-10-CM

## 2019-09-26 DIAGNOSIS — I50.31 ACUTE DIASTOLIC CONGESTIVE HEART FAILURE: Chronic | ICD-10-CM

## 2019-09-26 DIAGNOSIS — Z23 NEED FOR INFLUENZA VACCINATION: ICD-10-CM

## 2019-09-26 DIAGNOSIS — E78.00 PURE HYPERCHOLESTEROLEMIA: ICD-10-CM

## 2019-09-26 DIAGNOSIS — E55.9 VITAMIN D DEFICIENCY: ICD-10-CM

## 2019-09-26 DIAGNOSIS — E11.8 DM TYPE 2, CONTROLLED, WITH COMPLICATION: ICD-10-CM

## 2019-09-26 DIAGNOSIS — Z09 HOSPITAL DISCHARGE FOLLOW-UP: ICD-10-CM

## 2019-09-26 DIAGNOSIS — I10 ESSENTIAL HYPERTENSION: ICD-10-CM

## 2019-09-26 DIAGNOSIS — K21.9 GASTROESOPHAGEAL REFLUX DISEASE WITHOUT ESOPHAGITIS: ICD-10-CM

## 2019-09-26 LAB — HBA1C MFR BLD: 5 %

## 2019-09-26 PROCEDURE — 99214 OFFICE O/P EST MOD 30 MIN: CPT | Mod: 25,S$GLB,, | Performed by: FAMILY MEDICINE

## 2019-09-26 PROCEDURE — G0008 ADMIN INFLUENZA VIRUS VAC: HCPCS | Mod: S$GLB,,, | Performed by: FAMILY MEDICINE

## 2019-09-26 PROCEDURE — 90662 FLU VACCINE - HIGH DOSE (65+) PRESERVATIVE FREE IM: ICD-10-PCS | Mod: S$GLB,,, | Performed by: FAMILY MEDICINE

## 2019-09-26 PROCEDURE — 83036 POCT HEMOGLOBIN A1C: ICD-10-PCS | Mod: QW,,, | Performed by: FAMILY MEDICINE

## 2019-09-26 PROCEDURE — G0008 FLU VACCINE - HIGH DOSE (65+) PRESERVATIVE FREE IM: ICD-10-PCS | Mod: S$GLB,,, | Performed by: FAMILY MEDICINE

## 2019-09-26 PROCEDURE — 90662 IIV NO PRSV INCREASED AG IM: CPT | Mod: S$GLB,,, | Performed by: FAMILY MEDICINE

## 2019-09-26 PROCEDURE — 83036 HEMOGLOBIN GLYCOSYLATED A1C: CPT | Mod: QW,,, | Performed by: FAMILY MEDICINE

## 2019-09-26 PROCEDURE — 99214 PR OFFICE/OUTPT VISIT, EST, LEVL IV, 30-39 MIN: ICD-10-PCS | Mod: 25,S$GLB,, | Performed by: FAMILY MEDICINE

## 2019-09-26 RX ORDER — LOVASTATIN 20 MG/1
20 TABLET ORAL NIGHTLY
Qty: 90 TABLET | Refills: 1 | Status: SHIPPED | OUTPATIENT
Start: 2019-09-26 | End: 2020-06-15 | Stop reason: SDUPTHER

## 2019-09-26 RX ORDER — BROMFENAC SODIUM 0.81 MG/ML
1 SOLUTION/ DROPS OPHTHALMIC DAILY PRN
Status: ON HOLD | COMMUNITY
End: 2022-03-18 | Stop reason: HOSPADM

## 2019-09-26 RX ORDER — TEMAZEPAM 15 MG/1
15 CAPSULE ORAL NIGHTLY
Qty: 90 CAPSULE | Refills: 1 | Status: SHIPPED | OUTPATIENT
Start: 2019-09-26 | End: 2020-06-15 | Stop reason: SDUPTHER

## 2019-09-26 RX ORDER — METFORMIN HYDROCHLORIDE 500 MG/1
500 TABLET ORAL
Qty: 90 TABLET | Refills: 1 | Status: SHIPPED | OUTPATIENT
Start: 2019-09-26 | End: 2020-03-23 | Stop reason: SDUPTHER

## 2019-09-26 RX ORDER — PANTOPRAZOLE SODIUM 20 MG/1
20 TABLET, DELAYED RELEASE ORAL DAILY
Qty: 90 TABLET | Refills: 0 | Status: SHIPPED | OUTPATIENT
Start: 2019-09-26 | End: 2019-12-04 | Stop reason: SDUPTHER

## 2019-09-26 RX ORDER — TEMAZEPAM 15 MG/1
1 CAPSULE ORAL NIGHTLY
COMMUNITY
End: 2019-09-26 | Stop reason: SDUPTHER

## 2019-09-26 RX ORDER — BENAZEPRIL HYDROCHLORIDE 40 MG/1
40 TABLET ORAL DAILY
Qty: 90 TABLET | Refills: 1 | Status: SHIPPED | OUTPATIENT
Start: 2019-09-26 | End: 2020-06-15 | Stop reason: SDUPTHER

## 2019-09-26 RX ORDER — METOPROLOL SUCCINATE 50 MG/1
50 TABLET, EXTENDED RELEASE ORAL EVERY MORNING
Qty: 90 TABLET | Refills: 1 | Status: SHIPPED | OUTPATIENT
Start: 2019-09-26 | End: 2020-06-15 | Stop reason: SDUPTHER

## 2019-09-26 RX ORDER — SPIRONOLACTONE 25 MG/1
1 TABLET ORAL DAILY
COMMUNITY
End: 2020-06-15 | Stop reason: SDUPTHER

## 2019-09-26 RX ORDER — CHOLECALCIFEROL (VITAMIN D3) 25 MCG
1000 TABLET ORAL EVERY MORNING
COMMUNITY
Start: 2019-09-26 | End: 2020-06-18 | Stop reason: SDUPTHER

## 2019-09-26 NOTE — PROGRESS NOTES
SUBJECTIVE:    Patient ID: Marisol Kerr is a 74 y.o. female.    Chief Complaint: Diabetes    This 74 year old white female and recently had a day hospital admission for nausea vomiting and diarrhea.  She had a major GI workup during her stay.  CT scan of abdomen and pelvis was unremarkable.  Dr. Kramer performed colonoscopy which showed colitis.  They were suspicious of pancreatitis so she was started on Creon she slowly advanced her diet and was discharged home yesterday.  She still feels somewhat weak and queasy today.  She is able eat sandwiches fruit Sprite Zero & decaf coffee.  Her stools are firming up nicely.  With the use of Creon with meals and cholestyramine powder t.i.d..  Patient has close family member with her today and is her caregiver at home as well.      Office Visit on 09/26/2019   Component Date Value Ref Range Status    Hemoglobin A1C 09/26/2019 5.0  % Final   No results displayed because visit has over 200 results.      Lab Visit on 07/30/2019   Component Date Value Ref Range Status    Sodium 07/30/2019 144  136 - 145 mmol/L Final    Potassium 07/30/2019 4.7  3.5 - 5.1 mmol/L Final    Chloride 07/30/2019 104  95 - 110 mmol/L Final    CO2 07/30/2019 34* 23 - 29 mmol/L Final    Glucose 07/30/2019 94  70 - 110 mg/dL Final    BUN, Bld 07/30/2019 40* 8 - 23 mg/dL Final    Creatinine 07/30/2019 1.5* 0.5 - 1.4 mg/dL Final    Calcium 07/30/2019 9.9  8.7 - 10.5 mg/dL Final    Total Protein 07/30/2019 6.6  6.0 - 8.4 g/dL Final    Albumin 07/30/2019 3.9  3.5 - 5.2 g/dL Final    Total Bilirubin 07/30/2019 0.6  0.1 - 1.0 mg/dL Final    Alkaline Phosphatase 07/30/2019 52* 55 - 135 U/L Final    AST 07/30/2019 19  10 - 40 U/L Final    ALT 07/30/2019 16  10 - 44 U/L Final    Anion Gap 07/30/2019 6* 8 - 16 mmol/L Final    eGFR if  07/30/2019 39.8* >60 mL/min/1.73 m^2 Final    eGFR if non  07/30/2019 34.6* >60 mL/min/1.73 m^2 Final    Cholesterol 07/30/2019  164  120 - 199 mg/dL Final    Triglycerides 07/30/2019 172* 30 - 150 mg/dL Final    HDL 07/30/2019 40  40 - 75 mg/dL Final    LDL Cholesterol 07/30/2019 89.6  63.0 - 159.0 mg/dL Final    Hdl/Cholesterol Ratio 07/30/2019 24.4  20.0 - 50.0 % Final    Total Cholesterol/HDL Ratio 07/30/2019 4.1  2.0 - 5.0 Final    Non-HDL Cholesterol 07/30/2019 124  mg/dL Final    WBC 07/30/2019 7.04  3.90 - 12.70 K/uL Final    RBC 07/30/2019 4.31  4.00 - 5.40 M/uL Final    Hemoglobin 07/30/2019 12.1  12.0 - 16.0 g/dL Final    Hematocrit 07/30/2019 39.9  37.0 - 48.5 % Final    Mean Corpuscular Volume 07/30/2019 93  82 - 98 fL Final    Mean Corpuscular Hemoglobin 07/30/2019 28.1  27.0 - 31.0 pg Final    Mean Corpuscular Hemoglobin Conc 07/30/2019 30.3* 32.0 - 36.0 g/dL Final    RDW 07/30/2019 12.7  11.5 - 14.5 % Final    Platelets 07/30/2019 152  150 - 350 K/uL Final    MPV 07/30/2019 11.8  9.2 - 12.9 fL Final    Immature Granulocytes 07/30/2019 0.4  0.0 - 0.5 % Final    Gran # (ANC) 07/30/2019 5.0  1.8 - 7.7 K/uL Final    Immature Grans (Abs) 07/30/2019 0.03  0.00 - 0.04 K/uL Final    Lymph # 07/30/2019 1.5  1.0 - 4.8 K/uL Final    Mono # 07/30/2019 0.5  0.3 - 1.0 K/uL Final    Eos # 07/30/2019 0.1  0.0 - 0.5 K/uL Final    Baso # 07/30/2019 0.04  0.00 - 0.20 K/uL Final    nRBC 07/30/2019 0  0 /100 WBC Final    Gran% 07/30/2019 70.5  38.0 - 73.0 % Final    Lymph% 07/30/2019 20.9  18.0 - 48.0 % Final    Mono% 07/30/2019 6.5  4.0 - 15.0 % Final    Eosinophil% 07/30/2019 1.1  0.0 - 8.0 % Final    Basophil% 07/30/2019 0.6  0.0 - 1.9 % Final    Differential Method 07/30/2019 Automated   Final    TSH 07/30/2019 0.550  0.340 - 5.600 uIU/mL Final       Past Medical History:   Diagnosis Date    CAD (coronary artery disease)     Cancer     colon    CHF (congestive heart failure)     Colon cancer     COPD (chronic obstructive pulmonary disease)     Decreased hearing     Diabetes mellitus     Diabetes mellitus,  type 2     Hypercholesterolemia     Hypertension     Hypoxemia     Insomnia     Obesity     Osteoarthritis      Past Surgical History:   Procedure Laterality Date    CHOLECYSTECTOMY      COLECTOMY      EXTERNAL EAR SURGERY      EYE SURGERY      FLEXIBLE SIGMOIDOSCOPY N/A 9/19/2019    Procedure: SIGMOIDOSCOPY, FLEXIBLE;  Surgeon: Biju Kramer III, MD;  Location: [facility];  Service: Endoscopy;  Laterality: N/A;    HYSTERECTOMY      partial     Family History   Problem Relation Age of Onset    Febrile seizures Mother     Heart failure Father        Marital Status:   Alcohol History:  reports that she drinks alcohol.  Tobacco History:  reports that she quit smoking about 13 years ago. Her smoking use included cigarettes. She started smoking about 55 years ago. She has a 150.00 pack-year smoking history. She has never used smokeless tobacco.  Drug History:  reports that she does not use drugs.    Review of patient's allergies indicates:   Allergen Reactions    Iodinated contrast media     Strawberries [strawberry] Hives and Itching       Current Outpatient Medications:     acetaminophen (TYLENOL) 500 MG tablet, Take 500 mg by mouth every 6 (six) hours as needed for Pain. , Disp: , Rfl:     albuterol (VENTOLIN HFA) 90 mcg/actuation inhaler, Inhale 2 puffs into the lungs every 6 (six) hours as needed for Wheezing or Shortness of Breath. Rescue, Disp: , Rfl:     aspirin (ECOTRIN) 81 MG EC tablet, Take 81 mg by mouth every morning. , Disp: , Rfl:     benazepril (LOTENSIN) 40 MG tablet, Take 1 tablet (40 mg total) by mouth once daily., Disp: 90 tablet, Rfl: 1    bromfenac (PROLENSA) 0.07 % Drop, Place 1 drop into both eyes daily as needed., Disp: , Rfl:     cholestyramine-aspartame (QUESTRAN LIGHT) 4 gram PwPk, Take 1 packet (4 g total) by mouth 3 (three) times daily., Disp: 270 packet, Rfl: 3    coenzyme Q10 100 mg capsule, Take 100 mg by mouth every morning., Disp: , Rfl:     CREON  36,000-114,000- 180,000 unit CpDR, Take 1 capsule by mouth 4 (four) times daily., Disp: , Rfl:     dextran 70-hypromellose (ARTIFICIAL TEARS,ULPA48-BNUJB,) 0.1-0.3 % Drop, Place 1 drop into both eyes daily as needed., Disp: , Rfl:     ergocalciferol (VITAMIN D2) 50,000 unit Cap, Take 1 capsule (50,000 Units total) by mouth every 7 days. (Patient taking differently: Take 50,000 Units by mouth every 7 days. ON SUNDAYS), Disp: 12 capsule, Rfl: 0    fish oil-omega-3 fatty acids 300-1,000 mg capsule, Take 1 capsule by mouth every morning., Disp: , Rfl:     FLAXSEED OIL ORAL, Take 1,200 mg by mouth every morning., Disp: , Rfl:     fluticasone (FLONASE) 50 mcg/actuation nasal spray, 2 sprays (100 mcg total) by Each Nare route once daily. (Patient taking differently: 2 sprays by Each Nostril route daily as needed for Allergies. ), Disp: 16 g, Rfl: 3    fluticasone-salmeterol diskus inhaler 250-50 mcg, Inhale 1 puff into the lungs 2 (two) times daily., Disp: 3 each, Rfl: 1    furosemide (LASIX) 20 MG tablet, Take 20 mg by mouth every morning. , Disp: , Rfl:     ipratropium-albuterol (COMBIVENT RESPIMAT)  mcg/actuation inhaler, Inhale 2 puffs into the lungs every 4 (four) hours as needed. Rescue (Patient taking differently: Inhale 2 puffs into the lungs every 4 (four) hours as needed for Shortness of Breath. Rescue), Disp: 3 Package, Rfl: 1    loperamide (IMODIUM) 2 mg capsule, Take 1 capsule (2 mg total) by mouth 4 (four) times daily., Disp: 90 capsule, Rfl: 0    lovastatin (MEVACOR) 20 MG tablet, Take 1 tablet (20 mg total) by mouth every evening., Disp: 90 tablet, Rfl: 1    metoprolol succinate (TOPROL-XL) 50 MG 24 hr tablet, Take 1 tablet (50 mg total) by mouth every morning., Disp: 90 tablet, Rfl: 1    nitroGLYCERIN 0.2 mg/hr TD PT24 (NITRODUR) 0.2 mg/hr, Place 1 patch onto the skin every morning., Disp: , Rfl:     nitroGLYCERIN 0.4 MG/DOSE TL SPRY (NITROLINGUAL) 400 mcg/spray spray, Place 1 spray  under the tongue every 5 (five) minutes as needed for Chest pain (DO NOT EXCEED A TOTAL OF 3 SPRAYS IN 15 MINUTES)., Disp: 4.9 g, Rfl: 0    ondansetron (ZOFRAN-ODT) 4 MG TbDL, Take 4 mg by mouth every 6 (six) hours as needed., Disp: , Rfl:     pantoprazole (PROTONIX) 20 MG tablet, Take 1 tablet (20 mg total) by mouth once daily., Disp: 90 tablet, Rfl: 0    PNV NO.115/IRON FUMARATE/FA (PRENATAL #115-IRON-FOLIC ACID ORAL), Take 1 tablet by mouth every morning. , Disp: , Rfl:     umeclidinium (INCRUSE ELLIPTA) 62.5 mcg/actuation DsDv, Inhale 62.5 mcg into the lungs every morning. Controller, Disp: , Rfl:     vit C/E/Zn/coppr/lutein/zeaxan (PRESERVISION AREDS-2 ORAL), Take 1 capsule by mouth every morning., Disp: , Rfl:     vitamin D (VITAMIN D3) 1000 units Tab, Take 1 tablet (1,000 Units total) by mouth every morning., Disp: , Rfl:     meloxicam (MOBIC) 7.5 MG tablet, Take 1 tablet (7.5 mg total) by mouth once daily. (Patient taking differently: Take 7.5 mg by mouth every morning. ), Disp: 90 tablet, Rfl: 1    metFORMIN (GLUCOPHAGE) 500 MG tablet, Take 1 tablet (500 mg total) by mouth daily with breakfast., Disp: 90 tablet, Rfl: 1    spironolactone (ALDACTONE) 25 MG tablet, Take 1 tablet by mouth once daily., Disp: , Rfl:     temazepam (RESTORIL) 15 mg Cap, Take 1 capsule (15 mg total) by mouth every evening., Disp: 90 capsule, Rfl: 1    Review of Systems       Objective:      Vitals:    09/26/19 1150   BP: 124/60   Pulse: 72   Temp: 98.3 °F (36.8 °C)   Weight: 82.1 kg (181 lb)   Height: 5' (1.524 m)     Body mass index is 35.35 kg/m².  Physical Exam   Constitutional: She is oriented to person, place, and time. She appears well-developed and well-nourished.   Obese white female sitting in a wheelchair.  Wearing oxygen at this time looking somewhat weak   HENT:   Head: Normocephalic and atraumatic.   Right Ear: External ear normal.   Left Ear: External ear normal.   Nose: Nose normal.   Mouth/Throat:  Oropharynx is clear and moist.   Eyes: Pupils are equal, round, and reactive to light. EOM are normal.   Neck: Normal range of motion. Neck supple. Carotid bruit is not present. No thyromegaly present.   Cardiovascular: Normal rate, regular rhythm, normal heart sounds and intact distal pulses.   No murmur heard.  Pulmonary/Chest: Effort normal and breath sounds normal. She has no wheezes. She has no rales.   Abdominal: Soft. Bowel sounds are normal. She exhibits no distension. There is no hepatosplenomegaly. There is no tenderness.   Hyperactive bowel sounds, nontender belly   Musculoskeletal: Normal range of motion. She exhibits no tenderness or deformity.        Lumbar back: Normal. She exhibits no pain and no spasm.   Bends 90 degrees at  Waist,   Lymphadenopathy:     She has no cervical adenopathy.   Neurological: She is alert and oriented to person, place, and time. No cranial nerve deficit. Coordination normal.   Skin: Skin is warm and dry. No rash noted.   Psychiatric: She has a normal mood and affect. Her behavior is normal. Judgment and thought content normal.   Nursing note and vitals reviewed.        Assessment:       1. Acute colitis    2. DM type 2, controlled, with complication    3. Essential hypertension, benign    4. Pure hypercholesterolemia    5. Gastroesophageal reflux disease without esophagitis    6. Need for influenza vaccination    7. Hospital discharge follow-up    8. Primary insomnia    9. Vitamin D deficiency    10. Essential hypertension    11. Acute diastolic congestive heart failure         Plan:       Acute colitis  She seems to be recovering as expected from her colitis.  Continue to advance diet as tolerated.  Use cholestyramine in Creon as needed  DM type 2, controlled, with complication  -     Hemoglobin A1C, POCT  -     metFORMIN (GLUCOPHAGE) 500 MG tablet; Take 1 tablet (500 mg total) by mouth daily with breakfast.  Dispense: 90 tablet; Refill: 1  A1c is 5.1 indicating well  controlled diabetes  Essential hypertension, benign  -     metoprolol succinate (TOPROL-XL) 50 MG 24 hr tablet; Take 1 tablet (50 mg total) by mouth every morning.  Dispense: 90 tablet; Refill: 1  -     benazepril (LOTENSIN) 40 MG tablet; Take 1 tablet (40 mg total) by mouth once daily.  Dispense: 90 tablet; Refill: 1  Blood pressure well controlled at this time  Pure hypercholesterolemia  -     lovastatin (MEVACOR) 20 MG tablet; Take 1 tablet (20 mg total) by mouth every evening.  Dispense: 90 tablet; Refill: 1    Gastroesophageal reflux disease without esophagitis  -     pantoprazole (PROTONIX) 20 MG tablet; Take 1 tablet (20 mg total) by mouth once daily.  Dispense: 90 tablet; Refill: 0    Need for influenza vaccination  -     Influenza - High Dose (65+) (PF) (IM)  Flu shot today  Hospital discharge follow-up  -     vitamin D (VITAMIN D3) 1000 units Tab; Take 1 tablet (1,000 Units total) by mouth every morning.    Primary insomnia  -     temazepam (RESTORIL) 15 mg Cap; Take 1 capsule (15 mg total) by mouth every evening.  Dispense: 90 capsule; Refill: 1    Vitamin D deficiency  -     vitamin D (VITAMIN D3) 1000 units Tab; Take 1 tablet (1,000 Units total) by mouth every morning.    Essential hypertension    Acute diastolic congestive heart failure      No follow-ups on file.

## 2019-09-30 NOTE — PT/OT/SLP DISCHARGE
Occupational Therapy Discharge Summary    Marisol Kerr  MRN: [id number]   Principal Problem: Diarrhea      Patient Discharged from acute Occupational Therapy on 09/25/2019.  Please refer to prior OT note dated 09/25/2019 for functional status.    Assessment:      Patient appropriate for care in another setting.    Objective:     GOALS:   Multidisciplinary Problems     Occupational Therapy Goals     Not on file          Multidisciplinary Problems (Resolved)        Problem: Occupational Therapy Goal    Goal Priority Disciplines Outcome Interventions   Occupational Therapy Goal   (Resolved)     OT, PT/OT Met    Description:  Goals to be met by: discharge    Patient will increase functional independence with ADLs by performing:    UE Dressing with Supervision.  LE Dressing with Supervision.  Grooming while standing at sink with Supervision.  Toileting from bedside commode with Supervision for hygiene and clothing management.   Toilet transfer to bedside commode with Supervision.                      Reasons for Discontinuation of Therapy Services  Transfer to alternate level of care.      Plan:     Patient Discharged to: Home with Home Health Service    Nani Lea OT  9/30/2019

## 2019-10-02 PROCEDURE — G0180 PR HOME HEALTH MD CERTIFICATION: ICD-10-PCS | Mod: S$GLB,,, | Performed by: FAMILY MEDICINE

## 2019-10-02 PROCEDURE — G0180 MD CERTIFICATION HHA PATIENT: HCPCS | Mod: S$GLB,,, | Performed by: FAMILY MEDICINE

## 2019-10-14 ENCOUNTER — OUTSIDE PLACE OF SERVICE (OUTPATIENT)
Dept: FAMILY MEDICINE | Facility: CLINIC | Age: 74
End: 2019-10-14
Payer: MEDICARE

## 2019-10-14 DIAGNOSIS — R19.7 DIARRHEA: ICD-10-CM

## 2019-10-21 DIAGNOSIS — K55.9 VASCULAR DISORDER OF INTESTINE: Primary | ICD-10-CM

## 2019-10-29 ENCOUNTER — HOSPITAL ENCOUNTER (OUTPATIENT)
Dept: RADIOLOGY | Facility: HOSPITAL | Age: 74
Discharge: HOME | End: 2019-10-29
Attending: INTERNAL MEDICINE
Payer: MEDICARE

## 2019-10-29 DIAGNOSIS — K55.9 VASCULAR DISORDER OF INTESTINE: ICD-10-CM

## 2019-10-29 LAB
CREAT SERPL-MCNC: 1.7 MG/DL (ref 0.5–1.4)
SAMPLE: ABNORMAL

## 2019-11-07 ENCOUNTER — OUTSIDE PLACE OF SERVICE (OUTPATIENT)
Dept: ADMINISTRATIVE | Facility: HOSPITAL | Age: 74
End: 2019-11-07
Payer: MEDICARE

## 2019-11-07 DIAGNOSIS — R19.7 DIARRHEA: ICD-10-CM

## 2019-11-12 DIAGNOSIS — R19.7 DIARRHEA, UNSPECIFIED TYPE: ICD-10-CM

## 2019-11-12 RX ORDER — LOPERAMIDE HYDROCHLORIDE 2 MG/1
2 CAPSULE ORAL 4 TIMES DAILY
Qty: 360 CAPSULE | Refills: 1 | Status: SHIPPED | OUTPATIENT
Start: 2019-11-12 | End: 2020-05-28 | Stop reason: SDUPTHER

## 2019-11-12 RX ORDER — CIPROFLOXACIN 500 MG/1
500 TABLET ORAL 2 TIMES DAILY
Qty: 14 TABLET | Refills: 0 | Status: SHIPPED | OUTPATIENT
Start: 2019-11-12 | End: 2020-02-19

## 2019-11-12 NOTE — TELEPHONE ENCOUNTER
"Per Dr. Dwyer, pt needs rx for Loperamide 2mg 1 po QID #360 1 refill and Cipro 500mg 1 po BID #14."  "

## 2019-12-03 ENCOUNTER — TELEPHONE (OUTPATIENT)
Dept: FAMILY MEDICINE | Facility: CLINIC | Age: 74
End: 2019-12-03

## 2019-12-03 NOTE — TELEPHONE ENCOUNTER
----- Message from Antonieta Wick sent at 12/3/2019 12:46 PM CST -----  Pt has a cold and would like to be seen by whoever is available before 1:00 any day asap. Pt #[phone]

## 2019-12-04 ENCOUNTER — TELEPHONE (OUTPATIENT)
Dept: FAMILY MEDICINE | Facility: CLINIC | Age: 74
End: 2019-12-04

## 2019-12-04 ENCOUNTER — OFFICE VISIT (OUTPATIENT)
Dept: FAMILY MEDICINE | Facility: CLINIC | Age: 74
End: 2019-12-04
Payer: MEDICARE

## 2019-12-04 VITALS
HEART RATE: 60 BPM | HEIGHT: 60 IN | DIASTOLIC BLOOD PRESSURE: 60 MMHG | WEIGHT: 185 LBS | SYSTOLIC BLOOD PRESSURE: 128 MMHG | BODY MASS INDEX: 36.32 KG/M2

## 2019-12-04 DIAGNOSIS — I25.10 ATHEROSCLEROSIS OF CORONARY ARTERY, ANGINA PRESENCE UNSPECIFIED, UNSPECIFIED VESSEL OR LESION TYPE, UNSPECIFIED WHETHER NATIVE OR TRANSPLANTED HEART: ICD-10-CM

## 2019-12-04 DIAGNOSIS — J40 BRONCHITIS: ICD-10-CM

## 2019-12-04 DIAGNOSIS — E78.00 PURE HYPERCHOLESTEROLEMIA: ICD-10-CM

## 2019-12-04 DIAGNOSIS — M16.0 PRIMARY OSTEOARTHRITIS OF BOTH HIPS: ICD-10-CM

## 2019-12-04 DIAGNOSIS — I50.31 ACUTE DIASTOLIC CONGESTIVE HEART FAILURE: Chronic | ICD-10-CM

## 2019-12-04 DIAGNOSIS — Z85.038 HISTORY OF COLON CANCER: ICD-10-CM

## 2019-12-04 DIAGNOSIS — J44.9 CHRONIC OBSTRUCTIVE PULMONARY DISEASE, UNSPECIFIED COPD TYPE: ICD-10-CM

## 2019-12-04 DIAGNOSIS — I10 ESSENTIAL HYPERTENSION, BENIGN: ICD-10-CM

## 2019-12-04 DIAGNOSIS — K21.9 GASTROESOPHAGEAL REFLUX DISEASE WITHOUT ESOPHAGITIS: ICD-10-CM

## 2019-12-04 DIAGNOSIS — E55.9 VITAMIN D DEFICIENCY: ICD-10-CM

## 2019-12-04 DIAGNOSIS — N18.30 CKD (CHRONIC KIDNEY DISEASE), STAGE III: ICD-10-CM

## 2019-12-04 DIAGNOSIS — E11.42 DIABETIC PERIPHERAL NEUROPATHY: Primary | ICD-10-CM

## 2019-12-04 DIAGNOSIS — R60.9 EDEMA, UNSPECIFIED TYPE: Primary | ICD-10-CM

## 2019-12-04 DIAGNOSIS — E11.8 DM TYPE 2, CONTROLLED, WITH COMPLICATION: ICD-10-CM

## 2019-12-04 DIAGNOSIS — I10 ESSENTIAL HYPERTENSION: ICD-10-CM

## 2019-12-04 DIAGNOSIS — E11.42 DIABETIC PERIPHERAL NEUROPATHY: ICD-10-CM

## 2019-12-04 PROCEDURE — 99214 OFFICE O/P EST MOD 30 MIN: CPT | Mod: S$GLB,,, | Performed by: NURSE PRACTITIONER

## 2019-12-04 PROCEDURE — 1159F MED LIST DOCD IN RCRD: CPT | Mod: S$GLB,,, | Performed by: NURSE PRACTITIONER

## 2019-12-04 PROCEDURE — 1159F PR MEDICATION LIST DOCUMENTED IN MEDICAL RECORD: ICD-10-PCS | Mod: S$GLB,,, | Performed by: NURSE PRACTITIONER

## 2019-12-04 PROCEDURE — 99214 PR OFFICE/OUTPT VISIT, EST, LEVL IV, 30-39 MIN: ICD-10-PCS | Mod: S$GLB,,, | Performed by: NURSE PRACTITIONER

## 2019-12-04 RX ORDER — BENZONATATE 200 MG/1
200 CAPSULE ORAL 3 TIMES DAILY PRN
Qty: 30 CAPSULE | Refills: 0 | Status: SHIPPED | OUTPATIENT
Start: 2019-12-04 | End: 2019-12-14

## 2019-12-04 RX ORDER — ERGOCALCIFEROL 1.25 MG/1
50000 CAPSULE ORAL
Qty: 12 CAPSULE | Refills: 1 | Status: SHIPPED | OUTPATIENT
Start: 2019-12-04 | End: 2020-06-22 | Stop reason: SDUPTHER

## 2019-12-04 RX ORDER — PANTOPRAZOLE SODIUM 20 MG/1
20 TABLET, DELAYED RELEASE ORAL DAILY
Qty: 90 TABLET | Refills: 1 | Status: SHIPPED | OUTPATIENT
Start: 2019-12-04 | End: 2020-06-15 | Stop reason: SDUPTHER

## 2019-12-04 RX ORDER — DIPHENHYDRAMINE HYDROCHLORIDE 25 MG/1
CAPSULE ORAL
Refills: 0 | COMMUNITY
Start: 2019-10-21 | End: 2020-12-07

## 2019-12-04 RX ORDER — FUROSEMIDE 20 MG/1
20 TABLET ORAL EVERY MORNING
Qty: 90 TABLET | Refills: 1 | Status: SHIPPED | OUTPATIENT
Start: 2019-12-04 | End: 2020-06-15 | Stop reason: SDUPTHER

## 2019-12-04 RX ORDER — DOXYCYCLINE HYCLATE 100 MG
100 TABLET ORAL 2 TIMES DAILY
Qty: 20 TABLET | Refills: 0 | Status: SHIPPED | OUTPATIENT
Start: 2019-12-04 | End: 2020-02-19

## 2019-12-04 NOTE — TELEPHONE ENCOUNTER
Spoke with [employer]. # is [phone] - they state is an uplift cushion the patient is asking for. They want us to fax them an order at [phone] and then they will send us a form. Unsure it the pts insurance is going to cover this.    They need a good dx - none in chart.

## 2019-12-04 NOTE — TELEPHONE ENCOUNTER
----- Message from Antonieta Wick sent at 12/4/2019 11:46 AM CST -----  The medical supply place is [employer]. On [address] in [facility]. #[phone]

## 2019-12-09 PROBLEM — J96.10 CHRONIC RESPIRATORY FAILURE: Status: ACTIVE | Noted: 2018-04-25

## 2019-12-10 NOTE — PROGRESS NOTES
SUBJECTIVE:    Patient ID: Marisol Kerr is a 74 y.o. female.    Chief Complaint: Sore Throat (brought bottles// SW); Cough (x2 weeks// SW); and Nasal Congestion (greenish, brownish, yellowish mucus// SW)    74 year old elderly female presents with complaints of not feeling well. Daughter is present during the exam. Reports that symptoms started about 1 week ago. No fever. Does admit to productive cough that seems to be worse when lying down. Sore throat. No ear pain. No sick contacts. Has not taken anything.       Hospital Outpatient Visit on 10/29/2019   Component Date Value Ref Range Status    POC Creatinine 10/29/2019 1.7* 0.5 - 1.4 mg/dL Final    Sample 10/29/2019 TAI   Final   Office Visit on 09/26/2019   Component Date Value Ref Range Status    Hemoglobin A1C 09/26/2019 5.0  % Final   No results displayed because visit has over 200 results.      Lab Visit on 07/30/2019   Component Date Value Ref Range Status    Sodium 07/30/2019 144  136 - 145 mmol/L Final    Potassium 07/30/2019 4.7  3.5 - 5.1 mmol/L Final    Chloride 07/30/2019 104  95 - 110 mmol/L Final    CO2 07/30/2019 34* 23 - 29 mmol/L Final    Glucose 07/30/2019 94  70 - 110 mg/dL Final    BUN, Bld 07/30/2019 40* 8 - 23 mg/dL Final    Creatinine 07/30/2019 1.5* 0.5 - 1.4 mg/dL Final    Calcium 07/30/2019 9.9  8.7 - 10.5 mg/dL Final    Total Protein 07/30/2019 6.6  6.0 - 8.4 g/dL Final    Albumin 07/30/2019 3.9  3.5 - 5.2 g/dL Final    Total Bilirubin 07/30/2019 0.6  0.1 - 1.0 mg/dL Final    Alkaline Phosphatase 07/30/2019 52* 55 - 135 U/L Final    AST 07/30/2019 19  10 - 40 U/L Final    ALT 07/30/2019 16  10 - 44 U/L Final    Anion Gap 07/30/2019 6* 8 - 16 mmol/L Final    eGFR if  07/30/2019 39.8* >60 mL/min/1.73 m^2 Final    eGFR if non  07/30/2019 34.6* >60 mL/min/1.73 m^2 Final    Cholesterol 07/30/2019 164  120 - 199 mg/dL Final    Triglycerides 07/30/2019 172* 30 - 150 mg/dL Final    HDL  07/30/2019 40  40 - 75 mg/dL Final    LDL Cholesterol 07/30/2019 89.6  63.0 - 159.0 mg/dL Final    Hdl/Cholesterol Ratio 07/30/2019 24.4  20.0 - 50.0 % Final    Total Cholesterol/HDL Ratio 07/30/2019 4.1  2.0 - 5.0 Final    Non-HDL Cholesterol 07/30/2019 124  mg/dL Final    WBC 07/30/2019 7.04  3.90 - 12.70 K/uL Final    RBC 07/30/2019 4.31  4.00 - 5.40 M/uL Final    Hemoglobin 07/30/2019 12.1  12.0 - 16.0 g/dL Final    Hematocrit 07/30/2019 39.9  37.0 - 48.5 % Final    Mean Corpuscular Volume 07/30/2019 93  82 - 98 fL Final    Mean Corpuscular Hemoglobin 07/30/2019 28.1  27.0 - 31.0 pg Final    Mean Corpuscular Hemoglobin Conc 07/30/2019 30.3* 32.0 - 36.0 g/dL Final    RDW 07/30/2019 12.7  11.5 - 14.5 % Final    Platelets 07/30/2019 152  150 - 350 K/uL Final    MPV 07/30/2019 11.8  9.2 - 12.9 fL Final    Immature Granulocytes 07/30/2019 0.4  0.0 - 0.5 % Final    Gran # (ANC) 07/30/2019 5.0  1.8 - 7.7 K/uL Final    Immature Grans (Abs) 07/30/2019 0.03  0.00 - 0.04 K/uL Final    Lymph # 07/30/2019 1.5  1.0 - 4.8 K/uL Final    Mono # 07/30/2019 0.5  0.3 - 1.0 K/uL Final    Eos # 07/30/2019 0.1  0.0 - 0.5 K/uL Final    Baso # 07/30/2019 0.04  0.00 - 0.20 K/uL Final    nRBC 07/30/2019 0  0 /100 WBC Final    Gran% 07/30/2019 70.5  38.0 - 73.0 % Final    Lymph% 07/30/2019 20.9  18.0 - 48.0 % Final    Mono% 07/30/2019 6.5  4.0 - 15.0 % Final    Eosinophil% 07/30/2019 1.1  0.0 - 8.0 % Final    Basophil% 07/30/2019 0.6  0.0 - 1.9 % Final    Differential Method 07/30/2019 Automated   Final    TSH 07/30/2019 0.550  0.340 - 5.600 uIU/mL Final       Past Medical History:   Diagnosis Date    CAD (coronary artery disease)     Cancer     colon    CHF (congestive heart failure)     Colon cancer     COPD (chronic obstructive pulmonary disease)     Decreased hearing     Diabetes mellitus     Diabetes mellitus, type 2     Hypercholesterolemia     Hypertension     Hypoxemia     Insomnia      Obesity     Osteoarthritis      Past Surgical History:   Procedure Laterality Date    CHOLECYSTECTOMY      COLECTOMY      EXTERNAL EAR SURGERY      EYE SURGERY      FLEXIBLE SIGMOIDOSCOPY N/A 9/19/2019    Procedure: SIGMOIDOSCOPY, FLEXIBLE;  Surgeon: Biju Kramer III, MD;  Location[facility];  Service: Endoscopy;  Laterality: N/A;    HYSTERECTOMY      partial     Family History   Problem Relation Age of Onset    Febrile seizures Mother     Heart failure Father        Marital Status:   Alcohol History:  reports that she drinks alcohol.  Tobacco History:  reports that she quit smoking about 13 years ago. Her smoking use included cigarettes. She started smoking about 55 years ago. She has a 150.00 pack-year smoking history. She has never used smokeless tobacco.  Drug History:  reports that she does not use drugs.    Review of patient's allergies indicates:   Allergen Reactions    Iodinated contrast media     Strawberries [strawberry] Hives and Itching       Current Outpatient Medications:     acetaminophen (TYLENOL) 500 MG tablet, Take 500 mg by mouth every 6 (six) hours as needed for Pain. , Disp: , Rfl:     albuterol (VENTOLIN HFA) 90 mcg/actuation inhaler, Inhale 2 puffs into the lungs every 6 (six) hours as needed for Wheezing or Shortness of Breath. Rescue, Disp: , Rfl:     aspirin (ECOTRIN) 81 MG EC tablet, Take 81 mg by mouth every morning. , Disp: , Rfl:     BANOPHEN 25 mg capsule, TAKE 2 CAPSULES BY MOUTH 1 HOUR PRIOR TO TEST, Disp: , Rfl: 0    benazepril (LOTENSIN) 40 MG tablet, Take 1 tablet (40 mg total) by mouth once daily., Disp: 90 tablet, Rfl: 1    bromfenac (PROLENSA) 0.07 % Drop, Place 1 drop into both eyes daily as needed., Disp: , Rfl:     cholestyramine-aspartame (QUESTRAN LIGHT) 4 gram PwPk, Take 1 packet (4 g total) by mouth 3 (three) times daily., Disp: 270 packet, Rfl: 3    ciprofloxacin HCl (CIPRO) 500 MG tablet, Take 1 tablet (500 mg total) by mouth 2 (two)  times daily., Disp: 14 tablet, Rfl: 0    coenzyme Q10 100 mg capsule, Take 100 mg by mouth every morning., Disp: , Rfl:     CREON 36,000-114,000- 180,000 unit CpDR, Take 1 capsule by mouth 4 (four) times daily., Disp: , Rfl:     dextran 70-hypromellose (ARTIFICIAL TEARS,[id number],) 0.1-0.3 % Drop, Place 1 drop into both eyes daily as needed., Disp: , Rfl:     ergocalciferol (VITAMIN D2) 50,000 unit Cap, Take 1 capsule (50,000 Units total) by mouth every 7 days., Disp: 12 capsule, Rfl: 1    fish oil-omega-3 fatty acids 300-1,000 mg capsule, Take 1 capsule by mouth every morning., Disp: , Rfl:     FLAXSEED OIL ORAL, Take 1,200 mg by mouth every morning., Disp: , Rfl:     fluticasone (FLONASE) 50 mcg/actuation nasal spray, 2 sprays (100 mcg total) by Each Nare route once daily. (Patient taking differently: 2 sprays by Each Nostril route daily as needed for Allergies. ), Disp: 16 g, Rfl: 3    fluticasone-salmeterol diskus inhaler 250-50 mcg, Inhale 1 puff into the lungs 2 (two) times daily., Disp: 3 each, Rfl: 1    furosemide (LASIX) 20 MG tablet, Take 1 tablet (20 mg total) by mouth every morning., Disp: 90 tablet, Rfl: 1    ipratropium-albuterol (COMBIVENT RESPIMAT)  mcg/actuation inhaler, Inhale 2 puffs into the lungs every 4 (four) hours as needed. Rescue (Patient taking differently: Inhale 2 puffs into the lungs every 4 (four) hours as needed for Shortness of Breath. Rescue), Disp: 3 Package, Rfl: 1    loperamide (IMODIUM) 2 mg capsule, Take 1 capsule (2 mg total) by mouth 4 (four) times daily., Disp: 360 capsule, Rfl: 1    lovastatin (MEVACOR) 20 MG tablet, Take 1 tablet (20 mg total) by mouth every evening., Disp: 90 tablet, Rfl: 1    meloxicam (MOBIC) 7.5 MG tablet, Take 1 tablet (7.5 mg total) by mouth once daily. (Patient taking differently: Take 7.5 mg by mouth every morning. ), Disp: 90 tablet, Rfl: 1    metFORMIN (GLUCOPHAGE) 500 MG tablet, Take 1 tablet (500 mg total) by mouth daily  with breakfast., Disp: 90 tablet, Rfl: 1    metoprolol succinate (TOPROL-XL) 50 MG 24 hr tablet, Take 1 tablet (50 mg total) by mouth every morning., Disp: 90 tablet, Rfl: 1    nitroGLYCERIN 0.2 mg/hr TD PT24 (NITRODUR) 0.2 mg/hr, Place 1 patch onto the skin every morning., Disp: , Rfl:     nitroGLYCERIN 0.4 MG/DOSE TL SPRY (NITROLINGUAL) 400 mcg/spray spray, Place 1 spray under the tongue every 5 (five) minutes as needed for Chest pain (DO NOT EXCEED A TOTAL OF 3 SPRAYS IN 15 MINUTES)., Disp: 4.9 g, Rfl: 0    ondansetron (ZOFRAN-ODT) 4 MG TbDL, Take 4 mg by mouth every 6 (six) hours as needed., Disp: , Rfl:     pantoprazole (PROTONIX) 20 MG tablet, Take 1 tablet (20 mg total) by mouth once daily., Disp: 90 tablet, Rfl: 1    PNV NO.115/IRON FUMARATE/FA (PRENATAL #115-IRON-FOLIC ACID ORAL), Take 1 tablet by mouth every morning. , Disp: , Rfl:     spironolactone (ALDACTONE) 25 MG tablet, Take 1 tablet by mouth once daily., Disp: , Rfl:     temazepam (RESTORIL) 15 mg Cap, Take 1 capsule (15 mg total) by mouth every evening., Disp: 90 capsule, Rfl: 1    umeclidinium (INCRUSE ELLIPTA) 62.5 mcg/actuation DsDv, Inhale 62.5 mcg into the lungs every morning. Controller, Disp: , Rfl:     vit C/E/Zn/coppr/lutein/zeaxan (PRESERVISION AREDS-2 ORAL), Take 1 capsule by mouth every morning., Disp: , Rfl:     vitamin D (VITAMIN D3) 1000 units Tab, Take 1 tablet (1,000 Units total) by mouth every morning., Disp: , Rfl:     benzonatate (TESSALON) 200 MG capsule, Take 1 capsule (200 mg total) by mouth 3 (three) times daily as needed., Disp: 30 capsule, Rfl: 0    doxycycline (VIBRA-TABS) 100 MG tablet, Take 1 tablet (100 mg total) by mouth 2 (two) times daily., Disp: 20 tablet, Rfl: 0    Review of Systems   Constitutional: Negative for chills and fever.   HENT: Positive for congestion and sinus pressure. Negative for ear discharge, ear pain and sore throat.    Eyes: Negative for discharge.   Respiratory: Positive for cough.  Negative for shortness of breath.    Cardiovascular: Negative for chest pain and leg swelling.          Objective:      Vitals:    12/04/19 1043   BP: 128/60   Pulse: 60   Weight: 83.9 kg (185 lb)   Height: 5' (1.524 m)     Body mass index is 36.13 kg/m².  Physical Exam   Constitutional: She appears well-developed and well-nourished. She does not appear ill.   In wheelchair. o2 dependent   HENT:   Right Ear: External ear normal.   Left Ear: External ear normal.   Mouth/Throat: Oropharynx is clear and moist and mucous membranes are normal. No tonsillar exudate.   Cardiovascular: Normal rate, regular rhythm and normal heart sounds.   Pulmonary/Chest: Breath sounds normal. She has no wheezes.   Diminished at bases. Pulse ox 96. o2 intact   Lymphadenopathy:     She has no cervical adenopathy.         Assessment:       1. Edema, unspecified type    2. Gastroesophageal reflux disease without esophagitis    3. Vitamin D deficiency    4. Bronchitis    5. DM type 2, controlled, with complication    6. History of colon cancer    7. CKD (chronic kidney disease), stage III    8. Diabetic peripheral neuropathy    9. Chronic obstructive pulmonary disease, unspecified COPD type    10. Acute diastolic congestive heart failure    11. Essential hypertension, benign    12. Atherosclerosis of coronary artery, angina presence unspecified, unspecified vessel or lesion type, unspecified whether native or transplanted heart    13. Essential hypertension    14. Pure hypercholesterolemia         Plan:       Edema, unspecified type  -     furosemide (LASIX) 20 MG tablet; Take 1 tablet (20 mg total) by mouth every morning.  Dispense: 90 tablet; Refill: 1    Gastroesophageal reflux disease without esophagitis  -     pantoprazole (PROTONIX) 20 MG tablet; Take 1 tablet (20 mg total) by mouth once daily.  Dispense: 90 tablet; Refill: 1    Vitamin D deficiency  -     ergocalciferol (VITAMIN D2) 50,000 unit Cap; Take 1 capsule (50,000 Units total)  by mouth every 7 days.  Dispense: 12 capsule; Refill: 1    Bronchitis  -     doxycycline (VIBRA-TABS) 100 MG tablet; Take 1 tablet (100 mg total) by mouth 2 (two) times daily.  Dispense: 20 tablet; Refill: 0  -     benzonatate (TESSALON) 200 MG capsule; Take 1 capsule (200 mg total) by mouth 3 (three) times daily as needed.  Dispense: 30 capsule; Refill: 0    DM type 2, controlled, with complication    History of colon cancer    CKD (chronic kidney disease), stage III    Diabetic peripheral neuropathy    Chronic obstructive pulmonary disease, unspecified COPD type    Acute diastolic congestive heart failure    Essential hypertension, benign    Atherosclerosis of coronary artery, angina presence unspecified, unspecified vessel or lesion type, unspecified whether native or transplanted heart    Essential hypertension    Pure hypercholesterolemia      Follow up in about 3 months (around 3/4/2020).

## 2019-12-17 ENCOUNTER — EXTERNAL HOME HEALTH (OUTPATIENT)
Dept: HOME HEALTH SERVICES | Facility: HOSPITAL | Age: 74
End: 2019-12-17

## 2019-12-30 PROBLEM — Z09 HOSPITAL DISCHARGE FOLLOW-UP: Status: RESOLVED | Noted: 2019-09-26 | Resolved: 2019-12-30

## 2020-01-23 ENCOUNTER — TELEPHONE (OUTPATIENT)
Dept: PULMONOLOGY | Facility: CLINIC | Age: 75
End: 2020-01-23

## 2020-01-23 DIAGNOSIS — R91.1 LUNG NODULE: ICD-10-CM

## 2020-01-24 ENCOUNTER — HOSPITAL ENCOUNTER (OUTPATIENT)
Dept: RADIOLOGY | Facility: HOSPITAL | Age: 75
Discharge: HOME | End: 2020-01-24
Attending: NURSE PRACTITIONER
Payer: MEDICARE

## 2020-01-24 DIAGNOSIS — R91.1 LUNG NODULE: ICD-10-CM

## 2020-01-24 PROCEDURE — 71250 CT THORAX DX C-: CPT | Mod: TC,PO

## 2020-01-28 ENCOUNTER — TELEPHONE (OUTPATIENT)
Dept: PULMONOLOGY | Facility: CLINIC | Age: 75
End: 2020-01-28

## 2020-01-28 ENCOUNTER — OFFICE VISIT (OUTPATIENT)
Dept: PULMONOLOGY | Facility: CLINIC | Age: 75
End: 2020-01-28
Payer: MEDICARE

## 2020-01-28 VITALS
WEIGHT: 187 LBS | BODY MASS INDEX: 36.52 KG/M2 | SYSTOLIC BLOOD PRESSURE: 154 MMHG | OXYGEN SATURATION: 95 % | DIASTOLIC BLOOD PRESSURE: 56 MMHG | HEART RATE: 74 BPM

## 2020-01-28 DIAGNOSIS — R91.8 GROUND GLASS OPACITY PRESENT ON IMAGING OF LUNG: Primary | ICD-10-CM

## 2020-01-28 DIAGNOSIS — R91.1 LUNG NODULE: ICD-10-CM

## 2020-01-28 DIAGNOSIS — J44.9 CHRONIC OBSTRUCTIVE PULMONARY DISEASE, UNSPECIFIED COPD TYPE: Primary | ICD-10-CM

## 2020-01-28 DIAGNOSIS — J96.11 CHRONIC HYPOXEMIC RESPIRATORY FAILURE: ICD-10-CM

## 2020-01-28 PROCEDURE — 99214 PR OFFICE/OUTPT VISIT, EST, LEVL IV, 30-39 MIN: ICD-10-PCS | Mod: S$GLB,,, | Performed by: NURSE PRACTITIONER

## 2020-01-28 PROCEDURE — 1159F MED LIST DOCD IN RCRD: CPT | Mod: S$GLB,,, | Performed by: NURSE PRACTITIONER

## 2020-01-28 PROCEDURE — 99214 OFFICE O/P EST MOD 30 MIN: CPT | Mod: S$GLB,,, | Performed by: NURSE PRACTITIONER

## 2020-01-28 PROCEDURE — 1125F AMNT PAIN NOTED PAIN PRSNT: CPT | Mod: S$GLB,,, | Performed by: NURSE PRACTITIONER

## 2020-01-28 PROCEDURE — 1125F PR PAIN SEVERITY QUANTIFIED, PAIN PRESENT: ICD-10-PCS | Mod: S$GLB,,, | Performed by: NURSE PRACTITIONER

## 2020-01-28 PROCEDURE — 1159F PR MEDICATION LIST DOCUMENTED IN MEDICAL RECORD: ICD-10-PCS | Mod: S$GLB,,, | Performed by: NURSE PRACTITIONER

## 2020-01-28 RX ORDER — ALBUTEROL SULFATE 1.25 MG/3ML
1.25 SOLUTION RESPIRATORY (INHALATION) EVERY 6 HOURS PRN
Qty: 1 BOX | Refills: 8 | Status: SHIPPED | OUTPATIENT
Start: 2020-01-28 | End: 2021-01-27

## 2020-01-28 NOTE — PATIENT INSTRUCTIONS
Treatment for COPD    Your healthcare provider will prescribe the best treatments for your COPD.  Treatment  Recommendations include the following:  · Medicines. Some medicines help relieve symptoms when you have them. Others are taken daily to control inflammation in the lungs. Always take your medicines as prescribed. Learn the names of your medicines, as well as how and when to use them.  · Oxygen therapy. Oxygen may be prescribed if tests show that your blood contains too little oxygen.  · Smoking. If you smoke, quit. Smoking is the main cause of COPD. Quitting will help you be able to better manage your COPD. Ask your healthcare provider about ways to help you quit smoking.  · Avoiding infections. Infections, like a cold or the flu, can cause your symptoms to worsen. Try to stay away from people who are sick. Wash your hands often. And, ask your healthcare provider about vaccines for the flu and pneumonia.  Coping with shortness of breath  Coping tips include the following:  · Exercise. Try to be as active as possible. This will improve energy levels and strengthen your muscles, so you can do more.  · Breathing techniques. Ask your healthcare provider or nurse to show you how to do pursed-lip breathing.  · Balance rest and activity. Each day, try to balance rest periods with activity. For example, you might start the day with getting dressed and eating breakfast, then relax and read the paper. After that, take a brief walk. And then sit with your feet up for a while.  · Pulmonary rehabilitation. Ask your provider, or call your local hospital to find out about pulmonary rehab programs. The programs help with managing your disease, breathing techniques, exercise, support and counseling.  · Healthy eating. Eating a healthy, balanced diet and making an effort to maintain your ideal weight are important to staying as healthy as possible. Make sure you have a lot of fruit and vegetables every day, as well as  balanced portions of whole grains, lean meats and fish, and low-fat dairy products.  Date Last Reviewed: 5/1/2016  © 9977-2814 [employer]. [address] [place] [address], [place] [address]. All rights reserved. This information is not intended as a substitute for professional medical care. Always follow your healthcare professional's instructions.      eed comparison of CT done   Continue the Advair twice a day   Continue the Incruse daily   You should consider pulmonary rehab  Refill albuterol for nebulizer   Follow up in about 6 months (around 7/28/2020).

## 2020-01-28 NOTE — TELEPHONE ENCOUNTER
Impression       1. Multiple pulmonary nodules identified as described.  Largest nodule measures 9 mm.  For multiple solid nodules with any 6 mm or greater, Fleischner Society guidelines recommend follow up with non-contrast chest CT at 3-6 months and 18-24 months after discovery.  2. Focal opacity with peripheral ground-glass reticular densities noted in the lingula measuring approximately 2.3 cm in aggregate.  Findings could reflect scarring, infectious focus, or neoplastic process.  Recommend clinical correlation and short-term follow-up.  3. Emphysematous changes bilaterally     Order placed for radiologist to compare to CT from last January.  Also sent radiologist a message.     Addendum  Findings: Ground-glass nodule in the anterior segment of the right lower lobe measuring 9 mm in diameter, is not significantly changed in size when compared with previous exam.  Focal opacity in the lingula is increased in size compared with previous exam and now measures 2.3 cm.  Spoke with Dr. Correa ordering a PET scan and the patients daughter is aware.

## 2020-01-28 NOTE — PROGRESS NOTES
SUBJECTIVE:    Patient ID: Marisol Kerr is a 74 y.o. female.    Chief Complaint: COPD (6 month follow up ) and Chest Pain (left side of her lung hurting for 2 weeks ( diarrhea ) )    Patient here today feeling well.  She is using Advair and Incruse.  She is wearing her oxygen all of the time. She was in hospital in September for colitis and pancreatitis.  She still has diarrhea when she does not take her meds per her daughter.     Past Medical History:   Diagnosis Date    CAD (coronary artery disease)     Cancer     colon    CHF (congestive heart failure)     Colitis     Colon cancer     COPD (chronic obstructive pulmonary disease)     Decreased hearing     Diabetes mellitus     Diabetes mellitus, type 2     Hypercholesterolemia     Hypertension     Hypoxemia     Insomnia     Obesity     Osteoarthritis      Past Surgical History:   Procedure Laterality Date    CHOLECYSTECTOMY      COLECTOMY      EXTERNAL EAR SURGERY      EYE SURGERY      FLEXIBLE SIGMOIDOSCOPY N/A 9/19/2019    Procedure: SIGMOIDOSCOPY, FLEXIBLE;  Surgeon: Biju Kramer III, MD;  Location: [facility];  Service: Endoscopy;  Laterality: N/A;    HYSTERECTOMY      partial     Family History   Problem Relation Age of Onset    Febrile seizures Mother     Heart failure Father         Social History:   Marital Status:   Occupation: Data Unavailable  Alcohol History:  reports that she drinks alcohol.  Tobacco History:  reports that she quit smoking about 13 years ago. Her smoking use included cigarettes. She started smoking about 55 years ago. She has a 150.00 pack-year smoking history. She has never used smokeless tobacco.  Drug History:  reports that she does not use drugs.    Review of patient's allergies indicates:   Allergen Reactions    Contrast media     Strawberries [strawberry] Hives and Itching       Current Outpatient Medications   Medication Sig Dispense Refill    acetaminophen (TYLENOL) 500 MG tablet Take  500 mg by mouth every 6 (six) hours as needed for Pain.       albuterol (VENTOLIN HFA) 90 mcg/actuation inhaler Inhale 2 puffs into the lungs every 6 (six) hours as needed for Wheezing or Shortness of Breath. Rescue      aspirin (ECOTRIN) 81 MG EC tablet Take 81 mg by mouth every morning.       benazepril (LOTENSIN) 40 MG tablet Take 1 tablet (40 mg total) by mouth once daily. 90 tablet 1    bromfenac (PROLENSA) 0.07 % Drop Place 1 drop into both eyes daily as needed.      cholestyramine-aspartame (QUESTRAN LIGHT) 4 gram PwPk Take 1 packet (4 g total) by mouth 3 (three) times daily. 270 packet 3    coenzyme Q10 100 mg capsule Take 100 mg by mouth every morning.      CREON 36,000-114,000- 180,000 unit CpDR Take 1 capsule by mouth 4 (four) times daily.      dextran 70-hypromellose (ARTIFICIAL TEARS,[id number],) 0.1-0.3 % Drop Place 1 drop into both eyes daily as needed.      ergocalciferol (VITAMIN D2) 50,000 unit Cap Take 1 capsule (50,000 Units total) by mouth every 7 days. 12 capsule 1    fish oil-omega-3 fatty acids 300-1,000 mg capsule Take 1 capsule by mouth every morning.      FLAXSEED OIL ORAL Take 1,200 mg by mouth every morning.      fluticasone (FLONASE) 50 mcg/actuation nasal spray 2 sprays (100 mcg total) by Each Nare route once daily. (Patient taking differently: 2 sprays by Each Nostril route daily as needed for Allergies. ) 16 g 3    ipratropium-albuterol (COMBIVENT RESPIMAT)  mcg/actuation inhaler Inhale 2 puffs into the lungs every 4 (four) hours as needed. Rescue (Patient taking differently: Inhale 2 puffs into the lungs every 4 (four) hours as needed for Shortness of Breath. Rescue) 3 Package 1    loperamide (IMODIUM) 2 mg capsule Take 1 capsule (2 mg total) by mouth 4 (four) times daily. 360 capsule 1    lovastatin (MEVACOR) 20 MG tablet Take 1 tablet (20 mg total) by mouth every evening. 90 tablet 1    meloxicam (MOBIC) 7.5 MG tablet Take 1 tablet (7.5 mg total) by mouth  once daily. (Patient taking differently: Take 7.5 mg by mouth every morning. ) 90 tablet 1    metFORMIN (GLUCOPHAGE) 500 MG tablet Take 1 tablet (500 mg total) by mouth daily with breakfast. 90 tablet 1    metoprolol succinate (TOPROL-XL) 50 MG 24 hr tablet Take 1 tablet (50 mg total) by mouth every morning. 90 tablet 1    nitroGLYCERIN 0.2 mg/hr TD PT24 (NITRODUR) 0.2 mg/hr Place 1 patch onto the skin every morning.      nitroGLYCERIN 0.4 MG/DOSE TL SPRY (NITROLINGUAL) 400 mcg/spray spray Place 1 spray under the tongue every 5 (five) minutes as needed for Chest pain (DO NOT EXCEED A TOTAL OF 3 SPRAYS IN 15 MINUTES). 4.9 g 0    ondansetron (ZOFRAN-ODT) 4 MG TbDL Take 4 mg by mouth every 6 (six) hours as needed.      pantoprazole (PROTONIX) 20 MG tablet Take 1 tablet (20 mg total) by mouth once daily. 90 tablet 1    PNV NO.115/IRON FUMARATE/FA (PRENATAL #115-IRON-FOLIC ACID ORAL) Take 1 tablet by mouth every morning.       spironolactone (ALDACTONE) 25 MG tablet Take 1 tablet by mouth once daily.      temazepam (RESTORIL) 15 mg Cap Take 1 capsule (15 mg total) by mouth every evening. 90 capsule 1    umeclidinium (INCRUSE ELLIPTA) 62.5 mcg/actuation DsDv Inhale 62.5 mcg into the lungs every morning. Controller      vit C/E/Zn/coppr/lutein/zeaxan (PRESERVISION AREDS-2 ORAL) Take 1 capsule by mouth every morning.      vitamin D (VITAMIN D3) 1000 units Tab Take 1 tablet (1,000 Units total) by mouth every morning.      albuterol (ACCUNEB) 1.25 mg/3 mL Nebu Take 3 mLs (1.25 mg total) by nebulization every 6 (six) hours as needed. Rescue 1 Box 8    BANOPHEN 25 mg capsule TAKE 2 CAPSULES BY MOUTH 1 HOUR PRIOR TO TEST  0    ciprofloxacin HCl (CIPRO) 500 MG tablet Take 1 tablet (500 mg total) by mouth 2 (two) times daily. (Patient not taking: Reported on 1/28/2020) 14 tablet 0    doxycycline (VIBRA-TABS) 100 MG tablet Take 1 tablet (100 mg total) by mouth 2 (two) times daily. (Patient not taking: Reported on  1/28/2020) 20 tablet 0    fluticasone-salmeterol diskus inhaler 250-50 mcg Inhale 1 puff into the lungs 2 (two) times daily. (Patient not taking: Reported on 1/28/2020) 3 each 1    furosemide (LASIX) 20 MG tablet Take 1 tablet (20 mg total) by mouth every morning. 90 tablet 1     No current facility-administered medications for this visit.          Last PFT:  1/2018   Last CT:05/2018    Review of Systems   Cardiovascular: Positive for chest pain.     General: Feeling Well.  Eyes: Vision is good.  ENT:  Left ear hurts    Heart:: No chest pain or palpitations.  Lungs: breathing is stable  GI: No Nausea, vomiting, constipation, diarrhea, or reflux.  : diarrhea   Musculoskeletal: No joint pain or myalgias.  Skin: No lesions or rashes.  Neuro: No headaches or neuropathy.  Lymph: swelling to legs  Psych: No anxiety or depression.  Endo: weight loss from diet     OBJECTIVE:      BP (!) 154/56 (BP Location: Left arm, Patient Position: Sitting)   Pulse 74   Wt 84.8 kg (187 lb)   SpO2 95% Comment: 1.0 oxygen level  BMI 36.52 kg/m²     Physical Exam  GENERAL: Older patient in no distress.  HEENT: Pupils equal and reactive. Extraocular movements intact. Nose intact.      Pharynx moist.  Erythema to left external auditory canal, no drainage, good light reflex   NECK: Supple.   HEART: Regular rate and rhythm. No murmur or gallop auscultated.  LUNGS: Clear but decreased throughout  to auscultation and percussion. Lung excursion symmetrical. No change in fremitus. No adventitial noises.  ABDOMEN: Bowel sounds present. Non-tender, no masses palpated.  EXTREMITIES: Normal muscle tone and joint movement, no cyanosis or clubbing.   LYMPHATICS: No adenopathy palpated, 1+ pitting edema to legs and feet  SKIN: Dry, intact, no lesions.   NEURO: Cranial nerves II-XII intact. Motor strength 5/5 bilaterally, upper and lower extremities.  PSYCH: Appropriate affect.    Assessment:       1. Chronic obstructive pulmonary disease,  unspecified COPD type    2. Chronic hypoxemic respiratory failure    3. Lung nodule          Plan:       Chronic obstructive pulmonary disease, unspecified COPD type  -     Complete PFT with bronchodilator; Future    Chronic hypoxemic respiratory failure    Lung nodule    Other orders  -     albuterol (ACCUNEB) 1.25 mg/3 mL Nebu; Take 3 mLs (1.25 mg total) by nebulization every 6 (six) hours as needed. Rescue  Dispense: 1 Box; Refill: 8           Need comparison of CT done   Continue the Advair twice a day   Continue the Incruse daily   You should consider pulmonary rehab  Refill albuterol for nebulizer   PFT  Follow up in about 6 months (around 7/28/2020).

## 2020-02-04 ENCOUNTER — TELEPHONE (OUTPATIENT)
Dept: PULMONOLOGY | Facility: CLINIC | Age: 75
End: 2020-02-04

## 2020-02-04 ENCOUNTER — HOSPITAL ENCOUNTER (OUTPATIENT)
Dept: PULMONOLOGY | Facility: HOSPITAL | Age: 75
Discharge: HOME | End: 2020-02-04
Attending: NURSE PRACTITIONER
Payer: MEDICARE

## 2020-02-04 DIAGNOSIS — J44.9 CHRONIC OBSTRUCTIVE PULMONARY DISEASE, UNSPECIFIED COPD TYPE: ICD-10-CM

## 2020-02-04 PROCEDURE — 94727 GAS DIL/WSHOT DETER LNG VOL: CPT

## 2020-02-04 PROCEDURE — 94060 EVALUATION OF WHEEZING: CPT

## 2020-02-04 PROCEDURE — 94010 BREATHING CAPACITY TEST: CPT | Mod: XB

## 2020-02-04 PROCEDURE — 94729 DIFFUSING CAPACITY: CPT

## 2020-02-04 NOTE — TELEPHONE ENCOUNTER
PFT shows severe obstruction no change with bronchodilator, mild restriction, moderate diffusion defect.  FEV1 is 0.85 pre. PFT has improved compared to previous but still bad.   Patients daughter aware.

## 2020-02-14 ENCOUNTER — HOSPITAL ENCOUNTER (OUTPATIENT)
Dept: RADIOLOGY | Facility: HOSPITAL | Age: 75
Discharge: HOME | End: 2020-02-14
Attending: NURSE PRACTITIONER
Payer: MEDICARE

## 2020-02-14 VITALS — HEIGHT: 61 IN | WEIGHT: 177 LBS | BODY MASS INDEX: 33.42 KG/M2

## 2020-02-14 DIAGNOSIS — R91.8 GROUND GLASS OPACITY PRESENT ON IMAGING OF LUNG: ICD-10-CM

## 2020-02-14 LAB — GLUCOSE SERPL-MCNC: 81 MG/DL (ref 70–110)

## 2020-02-14 PROCEDURE — A9552 F18 FDG: HCPCS | Mod: PO

## 2020-02-14 PROCEDURE — 78815 PET IMAGE W/CT SKULL-THIGH: CPT | Mod: TC,PO,PI

## 2020-02-17 ENCOUNTER — TELEPHONE (OUTPATIENT)
Dept: PULMONOLOGY | Facility: CLINIC | Age: 75
End: 2020-02-17

## 2020-02-17 RX ORDER — ALBUTEROL SULFATE 90 UG/1
AEROSOL, METERED RESPIRATORY (INHALATION)
Qty: 18 G | Refills: 0 | OUTPATIENT
Start: 2020-02-17

## 2020-02-17 NOTE — TELEPHONE ENCOUNTER
The PET scan does not show any hypermetabolism at the 2.3 cm lingular mass.  This could obviously still be an adenocarcinoma or adenocarcinoma in situ.  Given the patient's very poor pulmonary functions and lack of symptomatology, will defer and repeat the scan in 6 months. Told her.

## 2020-02-18 ENCOUNTER — TELEPHONE (OUTPATIENT)
Dept: FAMILY MEDICINE | Facility: CLINIC | Age: 75
End: 2020-02-18

## 2020-02-18 NOTE — TELEPHONE ENCOUNTER
Spoke to patient that fasting lab is due, but I see she moved her office visit up to tomorrow so I told her to get labs drawn while she is here. Patient states she uses [facility] lab. I informed her to just have Dr Dwyer send those orders to [facility] at her visit tomorrow. Verbalized understanding.

## 2020-02-19 ENCOUNTER — OFFICE VISIT (OUTPATIENT)
Dept: FAMILY MEDICINE | Facility: CLINIC | Age: 75
End: 2020-02-19
Payer: MEDICARE

## 2020-02-19 VITALS
SYSTOLIC BLOOD PRESSURE: 138 MMHG | HEART RATE: 64 BPM | HEIGHT: 61 IN | DIASTOLIC BLOOD PRESSURE: 86 MMHG | WEIGHT: 179 LBS | BODY MASS INDEX: 33.79 KG/M2

## 2020-02-19 DIAGNOSIS — E11.42 DIABETIC PERIPHERAL NEUROPATHY: ICD-10-CM

## 2020-02-19 DIAGNOSIS — E78.00 PURE HYPERCHOLESTEROLEMIA: ICD-10-CM

## 2020-02-19 DIAGNOSIS — K21.9 GASTROESOPHAGEAL REFLUX DISEASE WITHOUT ESOPHAGITIS: ICD-10-CM

## 2020-02-19 DIAGNOSIS — J43.1 PANLOBULAR EMPHYSEMA: ICD-10-CM

## 2020-02-19 DIAGNOSIS — I10 ESSENTIAL HYPERTENSION: ICD-10-CM

## 2020-02-19 DIAGNOSIS — E11.8 DM TYPE 2, CONTROLLED, WITH COMPLICATION: ICD-10-CM

## 2020-02-19 DIAGNOSIS — N18.30 CKD (CHRONIC KIDNEY DISEASE), STAGE III: ICD-10-CM

## 2020-02-19 DIAGNOSIS — I50.31 ACUTE DIASTOLIC CONGESTIVE HEART FAILURE: Chronic | ICD-10-CM

## 2020-02-19 DIAGNOSIS — M81.0 OSTEOPOROSIS, POST-MENOPAUSAL: ICD-10-CM

## 2020-02-19 DIAGNOSIS — I10 ESSENTIAL HYPERTENSION, BENIGN: ICD-10-CM

## 2020-02-19 DIAGNOSIS — M16.0 PRIMARY OSTEOARTHRITIS OF BOTH HIPS: ICD-10-CM

## 2020-02-19 DIAGNOSIS — R60.0 LOCALIZED EDEMA: ICD-10-CM

## 2020-02-19 DIAGNOSIS — J96.11 CHRONIC RESPIRATORY FAILURE WITH HYPOXIA: Primary | ICD-10-CM

## 2020-02-19 DIAGNOSIS — I25.10 ATHEROSCLEROSIS OF CORONARY ARTERY, ANGINA PRESENCE UNSPECIFIED, UNSPECIFIED VESSEL OR LESION TYPE, UNSPECIFIED WHETHER NATIVE OR TRANSPLANTED HEART: ICD-10-CM

## 2020-02-19 LAB — HBA1C MFR BLD: 5.2 %

## 2020-02-19 PROCEDURE — 83036 POCT HEMOGLOBIN A1C: ICD-10-PCS | Mod: QW,,, | Performed by: FAMILY MEDICINE

## 2020-02-19 PROCEDURE — 83036 HEMOGLOBIN GLYCOSYLATED A1C: CPT | Mod: QW,,, | Performed by: FAMILY MEDICINE

## 2020-02-19 PROCEDURE — 99214 OFFICE O/P EST MOD 30 MIN: CPT | Mod: S$GLB,,, | Performed by: FAMILY MEDICINE

## 2020-02-19 PROCEDURE — 99214 PR OFFICE/OUTPT VISIT, EST, LEVL IV, 30-39 MIN: ICD-10-PCS | Mod: S$GLB,,, | Performed by: FAMILY MEDICINE

## 2020-02-19 RX ORDER — ALBUTEROL SULFATE 90 UG/1
2 AEROSOL, METERED RESPIRATORY (INHALATION) EVERY 6 HOURS PRN
Qty: 36 G | Refills: 3 | Status: SHIPPED | OUTPATIENT
Start: 2020-02-19 | End: 2020-06-15 | Stop reason: SDUPTHER

## 2020-02-19 NOTE — PROGRESS NOTES
SUBJECTIVE:    Patient ID: Marisol Kerr is a 74 y.o. female.    Chief Complaint: Diabetes (did not bring any medication bottles // states she just needs a refill on the Albuterol inhaler. ac // Refused mammogram and dexa, states she had a PET scan.)    This 74-year-old female has a diabetic who has a left upper lobe lung lesion.  She is followed by Dr. Correa the pulmonologist.  Recent PET scan showed no significant uptake in the left upper lobe she is a [employer] patient who is oxygen dependent 24/7 on 2-3 L nasal cannula.  She sees Dr. Benjamin cardiology for coronary artery disease and diastolic CHF.  Her blood sugars have been running low at 125 or less.  She is mobile at short distances in the home.  But uses a wheelchair when out in public.      Office Visit on 02/19/2020   Component Date Value Ref Range Status    Hemoglobin A1C 02/19/2020 5.2  % Final   Hospital Outpatient Visit on 02/14/2020   Component Date Value Ref Range Status    POC Glucose 02/14/2020 81  70 - 110 Final   Hospital Outpatient Visit on 10/29/2019   Component Date Value Ref Range Status    POC Creatinine 10/29/2019 1.7* 0.5 - 1.4 mg/dL Final    Sample 10/29/2019 TAI   Final   Office Visit on 09/26/2019   Component Date Value Ref Range Status    Hemoglobin A1C 09/26/2019 5.0  % Final   No results displayed because visit has over 200 results.          Past Medical History:   Diagnosis Date    CAD (coronary artery disease)     Cancer     colon    CHF (congestive heart failure)     Colitis     Colon cancer     COPD (chronic obstructive pulmonary disease)     Decreased hearing     Diabetes mellitus     Diabetes mellitus, type 2     Hypercholesterolemia     Hypertension     Hypoxemia     Insomnia     Obesity     Osteoarthritis      Past Surgical History:   Procedure Laterality Date    CHOLECYSTECTOMY      COLECTOMY      EXTERNAL EAR SURGERY      EYE SURGERY      FLEXIBLE SIGMOIDOSCOPY N/A 9/19/2019    Procedure:  SIGMOIDOSCOPY, FLEXIBLE;  Surgeon: Biju Kramer III, MD;  Location: [facility];  Service: Endoscopy;  Laterality: N/A;    HYSTERECTOMY      partial     Family History   Problem Relation Age of Onset    Febrile seizures Mother     Heart failure Father        Marital Status:   Alcohol History:  reports that she drinks alcohol.  Tobacco History:  reports that she quit smoking about 13 years ago. Her smoking use included cigarettes. She started smoking about 55 years ago. She has a 150.00 pack-year smoking history. She has never used smokeless tobacco.  Drug History:  reports that she does not use drugs.    Review of patient's allergies indicates:   Allergen Reactions    Iodinated contrast media     Strawberries [strawberry] Hives and Itching       Current Outpatient Medications:     acetaminophen (TYLENOL) 500 MG tablet, Take 500 mg by mouth every 6 (six) hours as needed for Pain. , Disp: , Rfl:     albuterol (ACCUNEB) 1.25 mg/3 mL Nebu, Take 3 mLs (1.25 mg total) by nebulization every 6 (six) hours as needed. Rescue, Disp: 1 Box, Rfl: 8    albuterol (VENTOLIN HFA) 90 mcg/actuation inhaler, Inhale 2 puffs into the lungs every 6 (six) hours as needed for Wheezing or Shortness of Breath. Rescue, Disp: 36 g, Rfl: 3    aspirin (ECOTRIN) 81 MG EC tablet, Take 81 mg by mouth every morning. , Disp: , Rfl:     BANOPHEN 25 mg capsule, TAKE 2 CAPSULES BY MOUTH 1 HOUR PRIOR TO TEST, Disp: , Rfl: 0    benazepril (LOTENSIN) 40 MG tablet, Take 1 tablet (40 mg total) by mouth once daily., Disp: 90 tablet, Rfl: 1    bromfenac (PROLENSA) 0.07 % Drop, Place 1 drop into both eyes daily as needed., Disp: , Rfl:     cholestyramine-aspartame (QUESTRAN LIGHT) 4 gram PwPk, Take 1 packet (4 g total) by mouth 3 (three) times daily., Disp: 270 packet, Rfl: 3    coenzyme Q10 100 mg capsule, Take 100 mg by mouth every morning., Disp: , Rfl:     CREON 36,000-114,000- 180,000 unit CpDR, Take 1 capsule by mouth 4 (four)  times daily., Disp: , Rfl:     dextran 70-hypromellose (ARTIFICIAL TEARS,[id number],) 0.1-0.3 % Drop, Place 1 drop into both eyes daily as needed., Disp: , Rfl:     ergocalciferol (VITAMIN D2) 50,000 unit Cap, Take 1 capsule (50,000 Units total) by mouth every 7 days., Disp: 12 capsule, Rfl: 1    fish oil-omega-3 fatty acids 300-1,000 mg capsule, Take 1 capsule by mouth every morning., Disp: , Rfl:     FLAXSEED OIL ORAL, Take 1,200 mg by mouth every morning., Disp: , Rfl:     fluticasone (FLONASE) 50 mcg/actuation nasal spray, 2 sprays (100 mcg total) by Each Nare route once daily. (Patient taking differently: 2 sprays by Each Nostril route daily as needed for Allergies. ), Disp: 16 g, Rfl: 3    fluticasone-salmeterol diskus inhaler 250-50 mcg, Inhale 1 puff into the lungs 2 (two) times daily. (Patient not taking: Reported on 1/28/2020), Disp: 3 each, Rfl: 1    furosemide (LASIX) 20 MG tablet, Take 1 tablet (20 mg total) by mouth every morning., Disp: 90 tablet, Rfl: 1    ipratropium-albuterol (COMBIVENT RESPIMAT)  mcg/actuation inhaler, Inhale 2 puffs into the lungs every 4 (four) hours as needed. Rescue (Patient taking differently: Inhale 2 puffs into the lungs every 4 (four) hours as needed for Shortness of Breath. Rescue), Disp: 3 Package, Rfl: 1    loperamide (IMODIUM) 2 mg capsule, Take 1 capsule (2 mg total) by mouth 4 (four) times daily., Disp: 360 capsule, Rfl: 1    lovastatin (MEVACOR) 20 MG tablet, Take 1 tablet (20 mg total) by mouth every evening., Disp: 90 tablet, Rfl: 1    metFORMIN (GLUCOPHAGE) 500 MG tablet, Take 1 tablet (500 mg total) by mouth daily with breakfast., Disp: 90 tablet, Rfl: 1    metoprolol succinate (TOPROL-XL) 50 MG 24 hr tablet, Take 1 tablet (50 mg total) by mouth every morning., Disp: 90 tablet, Rfl: 1    nitroGLYCERIN 0.2 mg/hr TD PT24 (NITRODUR) 0.2 mg/hr, Place 1 patch onto the skin every morning., Disp: , Rfl:     nitroGLYCERIN 0.4 MG/DOSE TL SPRY  (NITROLINGUAL) 400 mcg/spray spray, Place 1 spray under the tongue every 5 (five) minutes as needed for Chest pain (DO NOT EXCEED A TOTAL OF 3 SPRAYS IN 15 MINUTES)., Disp: 4.9 g, Rfl: 0    ondansetron (ZOFRAN-ODT) 4 MG TbDL, Take 4 mg by mouth every 6 (six) hours as needed., Disp: , Rfl:     pantoprazole (PROTONIX) 20 MG tablet, Take 1 tablet (20 mg total) by mouth once daily., Disp: 90 tablet, Rfl: 1    PNV NO.115/IRON FUMARATE/FA (PRENATAL #115-IRON-FOLIC ACID ORAL), Take 1 tablet by mouth every morning. , Disp: , Rfl:     spironolactone (ALDACTONE) 25 MG tablet, Take 1 tablet by mouth once daily., Disp: , Rfl:     temazepam (RESTORIL) 15 mg Cap, Take 1 capsule (15 mg total) by mouth every evening., Disp: 90 capsule, Rfl: 1    umeclidinium (INCRUSE ELLIPTA) 62.5 mcg/actuation DsDv, Inhale 62.5 mcg into the lungs every morning. Controller, Disp: , Rfl:     vit C/E/Zn/coppr/lutein/zeaxan (PRESERVISION AREDS-2 ORAL), Take 1 capsule by mouth every morning., Disp: , Rfl:     vitamin D (VITAMIN D3) 1000 units Tab, Take 1 tablet (1,000 Units total) by mouth every morning., Disp: , Rfl:     Review of Systems   Constitutional: Negative for appetite change, chills, fatigue, fever and unexpected weight change.   HENT: Negative for congestion, ear pain, sinus pain, sore throat and trouble swallowing.    Eyes: Negative for pain, discharge and visual disturbance.   Respiratory: Negative for apnea, cough, shortness of breath and wheezing.         Dr. Correa prescribed nebulizer 3 times a day with albuterol., uses Advair and Combivent..   Cardiovascular: Negative for chest pain, palpitations and leg swelling.   Gastrointestinal: Negative for abdominal pain, blood in stool, constipation, diarrhea, nausea and vomiting.   Endocrine: Negative for heat intolerance, polydipsia and polyuria.   Genitourinary: Negative for difficulty urinating, dyspareunia, dysuria, frequency, hematuria and menstrual problem.   Musculoskeletal:  "Negative for arthralgias, back pain, gait problem, joint swelling and myalgias.   Allergic/Immunologic: Negative for environmental allergies, food allergies and immunocompromised state.   Neurological: Negative for dizziness, tremors, seizures, numbness and headaches.   Psychiatric/Behavioral: Negative for behavioral problems, confusion, hallucinations and suicidal ideas. The patient is not nervous/anxious.           Objective:      Vitals:    02/19/20 1220   BP: 138/86   Pulse: 64   Weight: 81.2 kg (179 lb)   Height: 5' 1" (1.549 m)     Body mass index is 33.82 kg/m².  Physical Exam   Constitutional: She is oriented to person, place, and time. She appears well-developed and well-nourished.   Obese white female sitting in a wheelchair wearing oxygen   HENT:   Head: Normocephalic and atraumatic.   Right Ear: External ear normal.   Left Ear: External ear normal.   Nose: Nose normal.   Mouth/Throat: Oropharynx is clear and moist.   Eyes: Pupils are equal, round, and reactive to light. EOM are normal.   Neck: Normal range of motion. Neck supple. Carotid bruit is not present. No thyromegaly present.   Cardiovascular: Normal rate, regular rhythm, normal heart sounds and intact distal pulses.   No murmur heard.  Pulmonary/Chest: Effort normal and breath sounds normal. She has no wheezes. She has no rales.   No wheezes or rales heard today   Abdominal: Soft. Bowel sounds are normal. She exhibits no distension. There is no hepatosplenomegaly. There is no tenderness.   Musculoskeletal: Normal range of motion. She exhibits no tenderness or deformity.        Lumbar back: Normal. She exhibits no pain and no spasm.   Bends 90 degrees at  waist, knees are very crepitant and have trouble bearing weight.  Trace edema to both feet.   Lymphadenopathy:     She has no cervical adenopathy.   Neurological: She is alert and oriented to person, place, and time. No cranial nerve deficit. Coordination normal.   Skin: Skin is warm and dry. No " rash noted.   Psychiatric: She has a normal mood and affect. Her behavior is normal. Judgment and thought content normal.   Nursing note and vitals reviewed.        Assessment:       1. Chronic respiratory failure with hypoxia    2. Diabetic peripheral neuropathy    3. Panlobular emphysema    4. Pure hypercholesterolemia    5. Essential hypertension    6. Essential hypertension, benign    7. Atherosclerosis of coronary artery, angina presence unspecified, unspecified vessel or lesion type, unspecified whether native or transplanted heart    8. Acute diastolic congestive heart failure    9. Localized edema    10. Primary osteoarthritis of both hips    11. Osteoporosis, post-menopausal    12. Gastroesophageal reflux disease without esophagitis    13. DM type 2, controlled, with complication    14. CKD (chronic kidney disease), stage III         Plan:       Chronic respiratory failure with hypoxia  Continue home O2 and portable O2, O2 sats are good today at 95  Diabetic peripheral neuropathy  -     Hemoglobin A1C, POCT  A1c is 5.2 today showing excellent control of diabetes  Panlobular emphysema  -     albuterol (VENTOLIN HFA) 90 mcg/actuation inhaler; Inhale 2 puffs into the lungs every 6 (six) hours as needed for Wheezing or Shortness of Breath. Rescue  Dispense: 36 g; Refill: 3  Continue rescue inhaler as needed  Pure hypercholesterolemia  -     Comprehensive metabolic panel; Future; Expected date: 02/19/2020  -     Lipid panel; Future; Expected date: 02/19/2020  Stable on current medications  Essential hypertension  Blood pressure well controlled  Essential hypertension, benign  -     CBC auto differential; Future; Expected date: 02/19/2020    Atherosclerosis of coronary artery, angina presence unspecified, unspecified vessel or lesion type, unspecified whether native or transplanted heart    Acute diastolic congestive heart failure  Compensated well  Localized edema    Primary osteoarthritis of both  hips    Osteoporosis, post-menopausal    Gastroesophageal reflux disease without esophagitis    DM type 2, controlled, with complication    CKD (chronic kidney disease), stage III  Labs ordered for this week    Follow up in about 3 months (around 5/19/2020) for bee-copd  dm.

## 2020-02-21 DIAGNOSIS — J43.1 PANLOBULAR EMPHYSEMA: Primary | ICD-10-CM

## 2020-03-23 DIAGNOSIS — E11.8 DM TYPE 2, CONTROLLED, WITH COMPLICATION: ICD-10-CM

## 2020-03-23 DIAGNOSIS — E11.42 DIABETIC PERIPHERAL NEUROPATHY: Primary | ICD-10-CM

## 2020-03-23 NOTE — TELEPHONE ENCOUNTER
----- Message from Darnell Velazquez sent at 3/23/2020  3:14 PM CDT -----  Contact: Marisol Kerr  Needs refill on Metformin and Meloxicam 7.5 mg  Send to [facility] on [address]  Pt# [phone]

## 2020-03-24 RX ORDER — MELOXICAM 7.5 MG/1
7.5 TABLET ORAL DAILY
Qty: 90 TABLET | Refills: 1 | Status: SHIPPED | OUTPATIENT
Start: 2020-03-24 | End: 2020-06-15 | Stop reason: SDUPTHER

## 2020-03-24 RX ORDER — METFORMIN HYDROCHLORIDE 500 MG/1
500 TABLET ORAL
Qty: 90 TABLET | Refills: 1 | Status: SHIPPED | OUTPATIENT
Start: 2020-03-24 | End: 2020-06-15 | Stop reason: SDUPTHER

## 2020-03-30 ENCOUNTER — TELEPHONE (OUTPATIENT)
Dept: FAMILY MEDICINE | Facility: CLINIC | Age: 75
End: 2020-03-30

## 2020-03-30 DIAGNOSIS — N18.30 CKD (CHRONIC KIDNEY DISEASE), STAGE III: Primary | ICD-10-CM

## 2020-03-30 DIAGNOSIS — I25.10 ATHEROSCLEROSIS OF CORONARY ARTERY, ANGINA PRESENCE UNSPECIFIED, UNSPECIFIED VESSEL OR LESION TYPE, UNSPECIFIED WHETHER NATIVE OR TRANSPLANTED HEART: ICD-10-CM

## 2020-03-30 NOTE — TELEPHONE ENCOUNTER
----- Message from Ramona Gloria MA sent at 3/30/2020 11:16 AM CDT -----  Pt's daughter called in requesting an order be sent to [facility] for a bedside commode.  Daughter has already spoke to [facility] and they advised her to request the order from pt's PCP.    Pt's daughter - [phone] Marisol

## 2020-04-01 ENCOUNTER — TELEPHONE (OUTPATIENT)
Dept: FAMILY MEDICINE | Facility: CLINIC | Age: 75
End: 2020-04-01

## 2020-04-01 DIAGNOSIS — R26.89 NEED FOR ASSISTANCE DUE TO UNSTEADY GAIT: ICD-10-CM

## 2020-04-01 DIAGNOSIS — I50.31 ACUTE DIASTOLIC CONGESTIVE HEART FAILURE: Primary | ICD-10-CM

## 2020-04-01 DIAGNOSIS — M16.0 PRIMARY OSTEOARTHRITIS OF BOTH HIPS: ICD-10-CM

## 2020-04-01 NOTE — TELEPHONE ENCOUNTER
----- Message from Kimberly Root sent at 4/1/2020  2:44 PM CDT -----  Brooke  from [facility] received a fax from us on this patient . This is not there patient Brooke's # [phone] GH

## 2020-04-01 NOTE — TELEPHONE ENCOUNTER
----- Message from Kaur Browne sent at 4/1/2020  1:53 PM CDT -----  vm @ 1:45 Pt calling about her bedside toilet needing approva   # [phone]

## 2020-05-15 ENCOUNTER — TELEPHONE (OUTPATIENT)
Dept: FAMILY MEDICINE | Facility: CLINIC | Age: 75
End: 2020-05-15

## 2020-05-15 NOTE — TELEPHONE ENCOUNTER
Spoke to patient who was happy to learn that she has an Audio OV on 5/21.  She will get labs done when she is able/ba

## 2020-05-18 ENCOUNTER — TELEPHONE (OUTPATIENT)
Dept: FAMILY MEDICINE | Facility: CLINIC | Age: 75
End: 2020-05-18

## 2020-05-18 NOTE — TELEPHONE ENCOUNTER
----- Message from Darnell Velazquez sent at 5/18/2020 11:57 AM CDT -----  Contact: Marisol Colvin's daughter  Daughter says that her mom gets her blood work done at [facility] and she is still quarantining . She would like to know if she can do a Virtual visit and if so what would she do as far as for getting blood work done? Also she would like to know if she can r/s her mom's appt for Wed at 8:20 with Monie ??   Marisol's# [phone]

## 2020-05-18 NOTE — TELEPHONE ENCOUNTER
Spoke with daughter to advise.  Daughter states she is going to speak with pt and find out what pt's preference is regarding appt.  She will call back tomorrow to advise.

## 2020-05-18 NOTE — TELEPHONE ENCOUNTER
Yes ok to do VV, ok to reschedule with DELONTE but she needs to download the appt 1st before it can be rescheduled. DELONTE can discuss lab options with the pt at the visit.

## 2020-05-26 ENCOUNTER — TELEPHONE (OUTPATIENT)
Dept: FAMILY MEDICINE | Facility: CLINIC | Age: 75
End: 2020-05-26

## 2020-05-26 RX ORDER — AZITHROMYCIN 250 MG/1
TABLET, FILM COATED ORAL
Qty: 6 TABLET | Refills: 0 | Status: SHIPPED | OUTPATIENT
Start: 2020-05-26 | End: 2020-05-31

## 2020-05-26 RX ORDER — DIPHENOXYLATE HYDROCHLORIDE AND ATROPINE SULFATE 2.5; .025 MG/1; MG/1
1 TABLET ORAL 3 TIMES DAILY PRN
Qty: 12 TABLET | Refills: 0 | Status: SHIPPED | OUTPATIENT
Start: 2020-05-26 | End: 2020-05-28

## 2020-05-26 NOTE — TELEPHONE ENCOUNTER
----- Message from Aniyah Murray sent at 5/26/2020  9:00 AM CDT -----  Pt is having mucus in her throat causing a cough and is needing something called in   Pharm [facility] [place]   Pt daughter [phone]

## 2020-05-26 NOTE — TELEPHONE ENCOUNTER
Let them know I will send in zpak- she needs to be using fluticasone nasal spray daily also. I will also send in a few lomotil however if diarrhea is severe or persistent we need to know about it

## 2020-05-26 NOTE — TELEPHONE ENCOUNTER
Please call and get more specifics-  Is she having Sore throat, coughing, more SOB, fever/chills or is this just post nasal drip

## 2020-05-26 NOTE — TELEPHONE ENCOUNTER
----- Message from Darnell Velazquez sent at 5/26/2020  2:29 PM CDT -----  Contact: Marisol causey's daughter  Daughter says that her mom just has the post nasal drip. No fever or sore throat but when the patient coughs, she coughs up mucus. Also a medication for diarrhea needs to be called in as well   [facility] [address]# [phone]

## 2020-05-27 DIAGNOSIS — R19.7 DIARRHEA, UNSPECIFIED TYPE: ICD-10-CM

## 2020-05-27 RX ORDER — LOPERAMIDE HYDROCHLORIDE 2 MG/1
2 CAPSULE ORAL 4 TIMES DAILY
Qty: 360 CAPSULE | Refills: 1 | Status: CANCELLED | OUTPATIENT
Start: 2020-05-27

## 2020-05-27 NOTE — TELEPHONE ENCOUNTER
----- Message from Darnell Velazquez sent at 5/27/2020 10:16 AM CDT -----  Contact: Marisol pt's daughter  Daughter is calling she says that her mother needs refill on the Loperamide   [facility] to [facility] [address]  Pt# [phone]

## 2020-05-28 DIAGNOSIS — R19.7 DIARRHEA, UNSPECIFIED TYPE: ICD-10-CM

## 2020-05-28 RX ORDER — LOPERAMIDE HYDROCHLORIDE 2 MG/1
2 CAPSULE ORAL 3 TIMES DAILY PRN
Qty: 90 CAPSULE | Refills: 2 | Status: SHIPPED | OUTPATIENT
Start: 2020-05-28 | End: 2020-06-22 | Stop reason: SDUPTHER

## 2020-05-28 NOTE — TELEPHONE ENCOUNTER
Spoke with pt daughter she is not taking the lomotil. She wants the imodium instead. She says the lomotil doesn't work for her. I canceled the rx for lomotil and set the imodium up.

## 2020-05-28 NOTE — TELEPHONE ENCOUNTER
----- Message from Ramona Gloria MA sent at 5/28/2020 10:19 AM CDT -----  Pt's deniseer called back from message left yesterday (see below).  She states her mother takes the loperamide 2 mg daily for her diarrhea and that is the Rx she was requesting be filled, however the lomotil was sent in instead.  Please advise.    Pharmacy - [facility] [address]    [facility] [phone]    Antonieta Marquez NP   5/27/20 12:56 PM     I just sent in lomotil yesterday for her- she doesn't need both lomotil and immodium      Antonieta Marquez NP   5/27/20 12:56 PM   I just sent in lomotil yesterday for her- she doesn't need both lomotil and immodium

## 2020-06-04 ENCOUNTER — PES CALL (OUTPATIENT)
Dept: ADMINISTRATIVE | Facility: CLINIC | Age: 75
End: 2020-06-04

## 2020-06-15 DIAGNOSIS — R60.9 EDEMA, UNSPECIFIED TYPE: ICD-10-CM

## 2020-06-15 DIAGNOSIS — K21.9 GASTROESOPHAGEAL REFLUX DISEASE WITHOUT ESOPHAGITIS: ICD-10-CM

## 2020-06-15 DIAGNOSIS — E78.00 PURE HYPERCHOLESTEROLEMIA: ICD-10-CM

## 2020-06-15 DIAGNOSIS — I10 ESSENTIAL HYPERTENSION, BENIGN: ICD-10-CM

## 2020-06-15 DIAGNOSIS — K52.9 ACUTE COLITIS: Primary | ICD-10-CM

## 2020-06-15 DIAGNOSIS — J43.1 PANLOBULAR EMPHYSEMA: ICD-10-CM

## 2020-06-15 DIAGNOSIS — E11.8 DM TYPE 2, CONTROLLED, WITH COMPLICATION: ICD-10-CM

## 2020-06-15 DIAGNOSIS — F51.01 PRIMARY INSOMNIA: ICD-10-CM

## 2020-06-15 DIAGNOSIS — E11.42 DIABETIC PERIPHERAL NEUROPATHY: ICD-10-CM

## 2020-06-15 RX ORDER — TEMAZEPAM 15 MG/1
15 CAPSULE ORAL NIGHTLY
Qty: 90 CAPSULE | Refills: 1 | Status: SHIPPED | OUTPATIENT
Start: 2020-06-15 | End: 2021-02-01 | Stop reason: SDUPTHER

## 2020-06-15 RX ORDER — METOPROLOL SUCCINATE 50 MG/1
50 TABLET, EXTENDED RELEASE ORAL EVERY MORNING
Qty: 90 TABLET | Refills: 1 | Status: SHIPPED | OUTPATIENT
Start: 2020-06-15 | End: 2020-09-03

## 2020-06-15 RX ORDER — SPIRONOLACTONE 25 MG/1
25 TABLET ORAL DAILY
Qty: 90 TABLET | Refills: 1 | Status: ON HOLD | OUTPATIENT
Start: 2020-06-15 | End: 2022-02-10 | Stop reason: HOSPADM

## 2020-06-15 RX ORDER — MELOXICAM 7.5 MG/1
7.5 TABLET ORAL DAILY
Qty: 90 TABLET | Refills: 1 | Status: SHIPPED | OUTPATIENT
Start: 2020-06-15 | End: 2021-04-01

## 2020-06-15 RX ORDER — METFORMIN HYDROCHLORIDE 500 MG/1
500 TABLET ORAL
Qty: 90 TABLET | Refills: 1 | Status: SHIPPED | OUTPATIENT
Start: 2020-06-15 | End: 2021-04-01

## 2020-06-15 RX ORDER — ALBUTEROL SULFATE 90 UG/1
2 AEROSOL, METERED RESPIRATORY (INHALATION) EVERY 6 HOURS PRN
Qty: 36 G | Refills: 3 | Status: SHIPPED | OUTPATIENT
Start: 2020-06-15 | End: 2020-12-07 | Stop reason: SDUPTHER

## 2020-06-15 RX ORDER — FUROSEMIDE 20 MG/1
20 TABLET ORAL EVERY MORNING
Qty: 90 TABLET | Refills: 1 | Status: SHIPPED | OUTPATIENT
Start: 2020-06-15 | End: 2020-10-01 | Stop reason: ALTCHOICE

## 2020-06-15 RX ORDER — UMECLIDINIUM 62.5 UG/1
62.5 AEROSOL, POWDER ORAL DAILY
Qty: 1 EACH | Refills: 5 | Status: SHIPPED | OUTPATIENT
Start: 2020-06-15 | End: 2020-06-22 | Stop reason: SDUPTHER

## 2020-06-15 RX ORDER — PANTOPRAZOLE SODIUM 20 MG/1
20 TABLET, DELAYED RELEASE ORAL DAILY
Qty: 90 TABLET | Refills: 1 | Status: SHIPPED | OUTPATIENT
Start: 2020-06-15 | End: 2020-12-07 | Stop reason: SDUPTHER

## 2020-06-15 RX ORDER — BENAZEPRIL HYDROCHLORIDE 40 MG/1
40 TABLET ORAL DAILY
Qty: 90 TABLET | Refills: 1 | Status: SHIPPED | OUTPATIENT
Start: 2020-06-15 | End: 2020-12-07 | Stop reason: SDUPTHER

## 2020-06-15 RX ORDER — LOVASTATIN 20 MG/1
20 TABLET ORAL NIGHTLY
Qty: 90 TABLET | Refills: 1 | Status: SHIPPED | OUTPATIENT
Start: 2020-06-15 | End: 2020-12-07 | Stop reason: SDUPTHER

## 2020-06-15 RX ORDER — CHOLESTYRAMINE 4 G/4.8G
4 POWDER, FOR SUSPENSION ORAL 3 TIMES DAILY
Qty: 270 PACKET | Refills: 3 | Status: SHIPPED | OUTPATIENT
Start: 2020-06-15 | End: 2020-12-07

## 2020-06-15 NOTE — TELEPHONE ENCOUNTER
----- Message from Geovanna Aparicio sent at 6/15/2020 12:58 PM CDT -----  Vm- patient called states that she needs ALL of her medications refilled and [facility] to [facility] on [address] .

## 2020-06-18 ENCOUNTER — TELEPHONE (OUTPATIENT)
Dept: FAMILY MEDICINE | Facility: CLINIC | Age: 75
End: 2020-06-18

## 2020-06-18 DIAGNOSIS — Z09 HOSPITAL DISCHARGE FOLLOW-UP: ICD-10-CM

## 2020-06-18 DIAGNOSIS — E55.9 VITAMIN D DEFICIENCY: ICD-10-CM

## 2020-06-18 RX ORDER — CHOLECALCIFEROL (VITAMIN D3) 25 MCG
1000 TABLET ORAL EVERY MORNING
Qty: 90 TABLET | Refills: 1 | Status: SHIPPED | OUTPATIENT
Start: 2020-06-18 | End: 2020-06-22

## 2020-06-18 RX ORDER — FLUTICASONE PROPIONATE AND SALMETEROL 250; 50 UG/1; UG/1
1 POWDER RESPIRATORY (INHALATION) 2 TIMES DAILY
Qty: 3 EACH | Refills: 1 | Status: SHIPPED | OUTPATIENT
Start: 2020-06-18 | End: 2021-02-01 | Stop reason: SDUPTHER

## 2020-06-18 NOTE — TELEPHONE ENCOUNTER
Spoke to pt. She is not wanting to come out for an appt due to being at high risk for sneha COVID19 but she wants her daughter to be there for audio appointment that she has scheduled. She is wanting to know if we could do a Saturday appt for the patient so her daughter can be there.     I heard someone talking about Saturday appt's yesterday. When is that starting, if at all?

## 2020-06-18 NOTE — TELEPHONE ENCOUNTER
"----- Message from Ramona Gloria MA sent at 6/18/2020 12:21 PM CDT -----  VM @ 12:09p - Pt left message stating that her pharmacy is faxing a request for refills, but we are not responding.    Vitamin D  Advair Inhaler    Pharmacy - Did not leave info    Pt - [phone]    LMOM that we do "not" accept refill request from the pharmacy.    "

## 2020-06-18 NOTE — TELEPHONE ENCOUNTER
----- Message from Kimberly Root sent at 6/18/2020 12:27 PM CDT -----  Please call the patient about her apt Monday with Antonieta Mooney an audio apt. She thought she had to come in. She needs her daughter to be with her for the apt.            Refill Advair and the vitamin she is on. [facility] [address]. Pt's # [phone]

## 2020-06-19 DIAGNOSIS — J30.9 ALLERGIC RHINITIS, UNSPECIFIED SEASONALITY, UNSPECIFIED TRIGGER: Primary | ICD-10-CM

## 2020-06-19 RX ORDER — FLUTICASONE PROPIONATE 50 MCG
2 SPRAY, SUSPENSION (ML) NASAL DAILY PRN
Qty: 16 G | Refills: 3 | Status: SHIPPED | OUTPATIENT
Start: 2020-06-19 | End: 2021-10-07

## 2020-06-19 NOTE — TELEPHONE ENCOUNTER
Spoke to pt to  let her know we arent starting the virtual Saturday appts until 7/11 and its only 1 provider. Pt stated she wanted her daughter  to be with her during the visit because she gets nervous and her daughter is able to calm her down. Let pt know bee has a late night on 7/8. Pt wasn't sure if her daughter would be able to do the appt since she works in [employer] and doesn't get off until 5. Let pt know I would give her daughter a call to see what time she would be able to be with pt to have her virtual visit. Spoke with pts daughter . Daughter stated she will be able to help pt with her virtual visit on 6/22. Called pt back to let her know her daughter will be able to do the visit with her on Monday.

## 2020-06-22 ENCOUNTER — OFFICE VISIT (OUTPATIENT)
Dept: FAMILY MEDICINE | Facility: CLINIC | Age: 75
End: 2020-06-22
Payer: MEDICARE

## 2020-06-22 DIAGNOSIS — N18.30 CKD (CHRONIC KIDNEY DISEASE), STAGE III: ICD-10-CM

## 2020-06-22 DIAGNOSIS — J44.9 CHRONIC OBSTRUCTIVE PULMONARY DISEASE, UNSPECIFIED COPD TYPE: Primary | ICD-10-CM

## 2020-06-22 DIAGNOSIS — E11.8 DM TYPE 2, CONTROLLED, WITH COMPLICATION: ICD-10-CM

## 2020-06-22 DIAGNOSIS — K86.1 CHRONIC PANCREATITIS, UNSPECIFIED PANCREATITIS TYPE: ICD-10-CM

## 2020-06-22 DIAGNOSIS — M16.0 PRIMARY OSTEOARTHRITIS OF BOTH HIPS: ICD-10-CM

## 2020-06-22 DIAGNOSIS — J96.11 CHRONIC HYPOXEMIC RESPIRATORY FAILURE: ICD-10-CM

## 2020-06-22 DIAGNOSIS — E78.5 DYSLIPIDEMIA: ICD-10-CM

## 2020-06-22 DIAGNOSIS — E55.9 VITAMIN D DEFICIENCY: ICD-10-CM

## 2020-06-22 DIAGNOSIS — I50.32 CHRONIC DIASTOLIC CONGESTIVE HEART FAILURE: Chronic | ICD-10-CM

## 2020-06-22 DIAGNOSIS — I25.10 ATHEROSCLEROSIS OF NATIVE CORONARY ARTERY OF NATIVE HEART WITHOUT ANGINA PECTORIS: ICD-10-CM

## 2020-06-22 PROCEDURE — 99442 PR PHYSICIAN TELEPHONE EVALUATION 11-20 MIN: ICD-10-PCS | Mod: 95,,, | Performed by: NURSE PRACTITIONER

## 2020-06-22 PROCEDURE — 99442 PR PHYSICIAN TELEPHONE EVALUATION 11-20 MIN: CPT | Mod: 95,,, | Performed by: NURSE PRACTITIONER

## 2020-06-22 RX ORDER — LOPERAMIDE HYDROCHLORIDE 2 MG/1
2 CAPSULE ORAL 4 TIMES DAILY PRN
Qty: 120 CAPSULE | Refills: 5 | Status: SHIPPED | OUTPATIENT
Start: 2020-06-22 | End: 2020-12-07

## 2020-06-22 RX ORDER — ERGOCALCIFEROL 1.25 MG/1
50000 CAPSULE ORAL
Qty: 12 CAPSULE | Refills: 1 | Status: SHIPPED | OUTPATIENT
Start: 2020-06-22 | End: 2020-12-28 | Stop reason: SDUPTHER

## 2020-06-22 NOTE — PROGRESS NOTES
Subjective:        The chief complaint leading to consultation is: regular f/u  The patient location is:  Home  Visit type: Virtual visit with synchronous audio/video or audio only  This was a phone conversation in lieu of in-person visit due to the coronavirus emergency. Patient acknowledged and agreed to the telephone encounter.     Pt seen via audio visit for COPD f/u. Pt reports has been self isolating d/t COVID19. Overall doing okay. Called here a couple weeks ago with sore throat c/os- resolved after taking abx.  Hx of COPD- stable on continous O2 2lnc. Follows with Dr. Correa. Uses nebs about twice a day but hasn't had to use her combivent INH recently.  Continues with chronic diarrhea- takes cholestyramine every other day and then loperamide 3-4 times/day as well as creon 3 times/day. Hx of colon Ca- followed by Dr. Alvarez. Denies black/bloody stool- UTD with cscope  Follows regularly with Dr. Benjamin for CAD/CHF- reports stable      Office Visit on 02/19/2020   Component Date Value Ref Range Status    Hemoglobin A1C 02/19/2020 5.2  % Final   Hospital Outpatient Visit on 02/14/2020   Component Date Value Ref Range Status    POC Glucose 02/14/2020 81  70 - 110 Final       Past Surgical History:   Procedure Laterality Date    CHOLECYSTECTOMY      COLECTOMY      CORONARY STENT PLACEMENT      EXTERNAL EAR SURGERY      EYE SURGERY      FLEXIBLE SIGMOIDOSCOPY N/A 9/19/2019    Procedure: SIGMOIDOSCOPY, FLEXIBLE;  Surgeon: Biju Kramer III, MD;  Location: [facility];  Service: Endoscopy;  Laterality: N/A;    HYSTERECTOMY      partial     Past Medical History:   Diagnosis Date    CAD (coronary artery disease)     Cancer     colon    CHF (congestive heart failure)     Colitis     Colon cancer     COPD (chronic obstructive pulmonary disease)     Decreased hearing     Diabetes mellitus     Diabetes mellitus, type 2     Hypercholesterolemia     Hypertension     Hypoxemia     Insomnia      Obesity     Osteoarthritis      Family History   Problem Relation Age of Onset    Febrile seizures Mother     Heart failure Father         Social History:   Marital Status:   Alcohol History:  reports current alcohol use.  Tobacco History:  reports that she quit smoking about 14 years ago. Her smoking use included cigarettes. She started smoking about 56 years ago. She has a 150.00 pack-year smoking history. She has never used smokeless tobacco.  Drug History:  reports no history of drug use.    Review of patient's allergies indicates:   Allergen Reactions    Iodinated contrast media     Strawberries [strawberry] Hives and Itching       Current Outpatient Medications   Medication Sig Dispense Refill    acetaminophen (TYLENOL) 500 MG tablet Take 500 mg by mouth every 6 (six) hours as needed for Pain.       albuterol (ACCUNEB) 1.25 mg/3 mL Nebu Take 3 mLs (1.25 mg total) by nebulization every 6 (six) hours as needed. Rescue 1 Box 8    albuterol (VENTOLIN HFA) 90 mcg/actuation inhaler Inhale 2 puffs into the lungs every 6 (six) hours as needed for Wheezing or Shortness of Breath. Rescue 36 g 3    aspirin (ECOTRIN) 81 MG EC tablet Take 81 mg by mouth every morning.       BANOPHEN 25 mg capsule TAKE 2 CAPSULES BY MOUTH 1 HOUR PRIOR TO TEST  0    benazepriL (LOTENSIN) 40 MG tablet Take 1 tablet (40 mg total) by mouth once daily. 90 tablet 1    bromfenac (PROLENSA) 0.07 % Drop Place 1 drop into both eyes daily as needed.      cholestyramine-aspartame (QUESTRAN LIGHT) 4 gram PwPk Take 1 packet (4 g total) by mouth 3 (three) times daily. 270 packet 3    coenzyme Q10 100 mg capsule Take 100 mg by mouth every morning.      CREON 36,000-114,000- 180,000 unit CpDR Take 1 capsule by mouth 4 (four) times daily.      dextran 70-hypromellose (ARTIFICIAL TEARS,DOTJ34-UYMEK,) 0.1-0.3 % Drop Place 1 drop into both eyes daily as needed.      ergocalciferol (VITAMIN D2) 50,000 unit Cap Take 1 capsule (50,000  Units total) by mouth every 7 days. 12 capsule 1    fish oil-omega-3 fatty acids 300-1,000 mg capsule Take 1 capsule by mouth every morning.      FLAXSEED OIL ORAL Take 1,200 mg by mouth every morning.      fluticasone propionate (FLONASE) 50 mcg/actuation nasal spray 2 sprays (100 mcg total) by Each Nostril route daily as needed for Allergies. 16 g 3    fluticasone-salmeterol diskus inhaler 250-50 mcg Inhale 1 puff into the lungs 2 (two) times daily. 3 each 1    furosemide (LASIX) 20 MG tablet Take 1 tablet (20 mg total) by mouth every morning. 90 tablet 1    ipratropium-albuterol (COMBIVENT RESPIMAT)  mcg/actuation inhaler Inhale 2 puffs into the lungs every 4 (four) hours as needed. Rescue (Patient taking differently: Inhale 2 puffs into the lungs every 4 (four) hours as needed for Shortness of Breath. Rescue) 3 Package 1    loperamide (IMODIUM) 2 mg capsule Take 1 capsule (2 mg total) by mouth 4 (four) times daily as needed. 120 capsule 5    lovastatin (MEVACOR) 20 MG tablet Take 1 tablet (20 mg total) by mouth every evening. 90 tablet 1    meloxicam (MOBIC) 7.5 MG tablet Take 1 tablet (7.5 mg total) by mouth once daily. 90 tablet 1    metFORMIN (GLUCOPHAGE) 500 MG tablet Take 1 tablet (500 mg total) by mouth daily with breakfast. 90 tablet 1    metoprolol succinate (TOPROL-XL) 50 MG 24 hr tablet Take 1 tablet (50 mg total) by mouth every morning. 90 tablet 1    nitroGLYCERIN 0.2 mg/hr TD PT24 (NITRODUR) 0.2 mg/hr Place 1 patch onto the skin every morning.      ondansetron (ZOFRAN-ODT) 4 MG TbDL Take 4 mg by mouth every 6 (six) hours as needed.      pantoprazole (PROTONIX) 20 MG tablet Take 1 tablet (20 mg total) by mouth once daily. 90 tablet 1    PNV NO.115/IRON FUMARATE/FA (PRENATAL #115-IRON-FOLIC ACID ORAL) Take 1 tablet by mouth every morning.       spironolactone (ALDACTONE) 25 MG tablet Take 1 tablet (25 mg total) by mouth once daily. 90 tablet 1    temazepam (RESTORIL) 15 mg Cap  Take 1 capsule (15 mg total) by mouth every evening. 90 capsule 1    umeclidinium (INCRUSE ELLIPTA) 62.5 mcg/actuation DsDv Inhale 62.5 mcg into the lungs every morning. Controller      vit C/E/Zn/coppr/lutein/zeaxan (PRESERVISION AREDS-2 ORAL) Take 1 capsule by mouth every morning.      nitroGLYCERIN 0.4 MG/DOSE TL SPRY (NITROLINGUAL) 400 mcg/spray spray Place 1 spray under the tongue every 5 (five) minutes as needed for Chest pain (DO NOT EXCEED A TOTAL OF 3 SPRAYS IN 15 MINUTES). (Patient not taking: Reported on 6/22/2020) 4.9 g 0     No current facility-administered medications for this visit.        Review of Systems   Constitutional: Negative for chills, fever and unexpected weight change.   Respiratory: Positive for cough (mild, chronic) and shortness of breath. Negative for wheezing.    Cardiovascular: Positive for leg swelling (stable with elevation). Negative for chest pain (hasn't used NTG for several months) and palpitations.   Gastrointestinal: Positive for diarrhea (chronic). Negative for abdominal pain, constipation, nausea and vomiting.   Genitourinary: Negative for dysuria and hematuria.   Musculoskeletal: Positive for arthralgias and back pain.   Skin: Negative for rash.   Neurological: Negative for dizziness and headaches.   Psychiatric/Behavioral: Negative for dysphoric mood.         Objective:        Physical Exam:   Physical Exam         Assessment:       1. Chronic obstructive pulmonary disease, unspecified COPD type    2. Chronic hypoxemic respiratory failure    3. Chronic diastolic congestive heart failure    4. Atherosclerosis of native coronary artery of native heart without angina pectoris    5. Primary osteoarthritis of both hips    6. DM type 2, controlled, with complication    7. Vitamin D deficiency    8. Chronic pancreatitis, unspecified pancreatitis type    9. CKD (chronic kidney disease), stage III    10. Dyslipidemia      Plan:   Chronic obstructive pulmonary disease,  unspecified COPD type  Comments:  stable on inhalers- f/u with Dr. Correa    Chronic hypoxemic respiratory failure  Comments:  stable on continous O2    Chronic diastolic congestive heart failure  Comments:  pt reports stable- follows with Dr. Benjamin    Atherosclerosis of native coronary artery of native heart without angina pectoris  Comments:  stable, denies angina    Primary osteoarthritis of both hips  Comments:  stable    DM type 2, controlled, with complication  Comments:  last A1C 5.2%  Orders:  -     CBC auto differential; Future; Expected date: 06/22/2020  -     Comprehensive metabolic panel; Future; Expected date: 06/22/2020  -     Lipid Panel; Future; Expected date: 06/22/2020  -     Urinalysis, Reflex to Urine Culture Urine, Clean Catch; Future; Expected date: 06/22/2020  -     Microalbumin/creatinine urine ratio; Future; Expected date: 06/22/2020  -     Hemoglobin A1C; Future; Expected date: 06/22/2020  -     TSH; Future; Expected date: 06/22/2020    Vitamin D deficiency  -     ergocalciferol (VITAMIN D2) 50,000 unit Cap; Take 1 capsule (50,000 Units total) by mouth every 7 days.  Dispense: 12 capsule; Refill: 1    Chronic pancreatitis, unspecified pancreatitis type  Comments:  stable though chronic diarrhea persists, denies abd pain/n/v  Orders:  -     loperamide (IMODIUM) 2 mg capsule; Take 1 capsule (2 mg total) by mouth 4 (four) times daily as needed.  Dispense: 120 capsule; Refill: 5    CKD (chronic kidney disease), stage III  Comments:  pt advised she's due for f/u labs    Dyslipidemia  Comments:  needs updated labs      Follow up in about 3 months (around 9/22/2020) for COPD, Diabetes.    Total time spent with patient: 20    Each patient to whom he or she provides medical services by telemedicine is:  (1) informed of the relationship between the physician and patient and the respective role of any other health care provider with respect to management of the patient; and (2) notified that he or  she may decline to receive medical services by telemedicine and may withdraw from such care at any time.    This note was created using [employer] voice recognition software that occasionally misinterprets phrases or words.                         This service was not originating from a related E/M service provided within the previous 7 days nor will  to an E/M service or procedure within the next 24 hours or my soonest available appointment.  Prevailing standard of care was able to be met in this audio-only visit.

## 2020-07-23 ENCOUNTER — TELEPHONE (OUTPATIENT)
Dept: FAMILY MEDICINE | Facility: CLINIC | Age: 75
End: 2020-07-23

## 2020-07-23 NOTE — TELEPHONE ENCOUNTER
From overdue results-lab due. LMOR on home and cell that fasting lab is due, and orders at [facility] lab. Updated remind me.

## 2020-08-19 ENCOUNTER — TELEPHONE (OUTPATIENT)
Dept: PULMONOLOGY | Facility: CLINIC | Age: 75
End: 2020-08-19

## 2020-08-19 DIAGNOSIS — R91.1 SOLITARY PULMONARY NODULE: ICD-10-CM

## 2020-08-20 ENCOUNTER — TELEPHONE (OUTPATIENT)
Dept: PULMONOLOGY | Facility: CLINIC | Age: 75
End: 2020-08-20

## 2020-08-20 NOTE — TELEPHONE ENCOUNTER
Patient was concerned about the ct to make sure they don't have dye in it she states last time she tried to get one they have to make sure no dye is in it because she has a reaction to the dye .

## 2020-09-01 ENCOUNTER — HOSPITAL ENCOUNTER (OUTPATIENT)
Dept: RADIOLOGY | Facility: HOSPITAL | Age: 75
Discharge: HOME | End: 2020-09-01
Attending: NURSE PRACTITIONER
Payer: MEDICARE

## 2020-09-01 DIAGNOSIS — R91.1 SOLITARY PULMONARY NODULE: ICD-10-CM

## 2020-09-01 PROCEDURE — 71250 CT THORAX DX C-: CPT | Mod: TC,PO

## 2020-09-03 RX ORDER — PANCRELIPASE 36000; 180000; 114000 [USP'U]/1; [USP'U]/1; [USP'U]/1
1 CAPSULE, DELAYED RELEASE PELLETS ORAL 4 TIMES DAILY
Qty: 360 CAPSULE | Refills: 1 | Status: SHIPPED | OUTPATIENT
Start: 2020-09-03 | End: 2020-12-07

## 2020-09-03 NOTE — TELEPHONE ENCOUNTER
----- Message from Antonieta Wick sent at 9/3/2020 10:13 AM CDT -----  Refill for all of her medicines. Pt wants to discuss her meds with someone before the refills are called in. Please advise pt #[phone]

## 2020-09-03 NOTE — TELEPHONE ENCOUNTER
Spoke with pt and let her know all her medications are at the pharmacy already and just need to be called for refills. She is asking about the Creon - that was the only thing we did not send because another dr was writing. She would like us to send it.

## 2020-09-09 ENCOUNTER — OFFICE VISIT (OUTPATIENT)
Dept: PULMONOLOGY | Facility: CLINIC | Age: 75
End: 2020-09-09
Payer: MEDICARE

## 2020-09-09 ENCOUNTER — TELEPHONE (OUTPATIENT)
Dept: FAMILY MEDICINE | Facility: CLINIC | Age: 75
End: 2020-09-09

## 2020-09-09 VITALS
TEMPERATURE: 97 F | HEART RATE: 56 BPM | DIASTOLIC BLOOD PRESSURE: 100 MMHG | HEIGHT: 61 IN | OXYGEN SATURATION: 99 % | BODY MASS INDEX: 34.17 KG/M2 | WEIGHT: 181 LBS | SYSTOLIC BLOOD PRESSURE: 170 MMHG

## 2020-09-09 DIAGNOSIS — J96.11 CHRONIC HYPOXEMIC RESPIRATORY FAILURE: ICD-10-CM

## 2020-09-09 DIAGNOSIS — J44.9 CHRONIC OBSTRUCTIVE PULMONARY DISEASE, UNSPECIFIED COPD TYPE: Primary | ICD-10-CM

## 2020-09-09 DIAGNOSIS — R91.8 PULMONARY NODULES: ICD-10-CM

## 2020-09-09 PROCEDURE — 99214 PR OFFICE/OUTPT VISIT, EST, LEVL IV, 30-39 MIN: ICD-10-PCS | Mod: S$GLB,,, | Performed by: NURSE PRACTITIONER

## 2020-09-09 PROCEDURE — 99214 OFFICE O/P EST MOD 30 MIN: CPT | Mod: S$GLB,,, | Performed by: NURSE PRACTITIONER

## 2020-09-09 NOTE — PROGRESS NOTES
SUBJECTIVE:    Patient ID: Marisol Kerr is a 75 y.o. female.    Chief Complaint: Follow-up    Patient here today feeling well.  She is using Advair and Incruse.  She is wearing her oxygen all of the time. Her CT showed new spiculated and some enlargement of lingula nodule. The patient is not having any worsening of shortness of breath, no cough. She actually feels that her breathing has improved.  Considering the severity of her COPD and emphysema both the patient and her daughter would like to just watch the nodules, they are aware that this could very well be cancer.      Past Medical History:   Diagnosis Date    CAD (coronary artery disease)     Cancer     colon    CHF (congestive heart failure)     Colitis     Colon cancer     COPD (chronic obstructive pulmonary disease)     Decreased hearing     Diabetes mellitus     Diabetes mellitus, type 2     Hypercholesterolemia     Hypertension     Hypoxemia     Insomnia     Obesity     Osteoarthritis      Past Surgical History:   Procedure Laterality Date    CHOLECYSTECTOMY      COLECTOMY      CORONARY STENT PLACEMENT      EXTERNAL EAR SURGERY      EYE SURGERY      FLEXIBLE SIGMOIDOSCOPY N/A 9/19/2019    Procedure: SIGMOIDOSCOPY, FLEXIBLE;  Surgeon: Biju Kramer III, MD;  Location: [facility];  Service: Endoscopy;  Laterality: N/A;    HYSTERECTOMY      partial     Family History   Problem Relation Age of Onset    Febrile seizures Mother     Heart failure Father         Social History:   Marital Status:   Occupation: Data Unavailable  Alcohol History:  reports current alcohol use.  Tobacco History:  reports that she quit smoking about 14 years ago. Her smoking use included cigarettes. She started smoking about 56 years ago. She has a 150.00 pack-year smoking history. She has never used smokeless tobacco.  Drug History:  reports no history of drug use.    Review of patient's allergies indicates:   Allergen Reactions    Contrast  media     Strawberries [strawberry] Hives and Itching       Current Outpatient Medications   Medication Sig Dispense Refill    acetaminophen (TYLENOL) 500 MG tablet Take 500 mg by mouth every 6 (six) hours as needed for Pain.       albuterol (ACCUNEB) 1.25 mg/3 mL Nebu Take 3 mLs (1.25 mg total) by nebulization every 6 (six) hours as needed. Rescue 1 Box 8    albuterol (VENTOLIN HFA) 90 mcg/actuation inhaler Inhale 2 puffs into the lungs every 6 (six) hours as needed for Wheezing or Shortness of Breath. Rescue 36 g 3    aspirin (ECOTRIN) 81 MG EC tablet Take 81 mg by mouth every morning.       benazepriL (LOTENSIN) 40 MG tablet Take 1 tablet (40 mg total) by mouth once daily. 90 tablet 1    bromfenac (PROLENSA) 0.07 % Drop Place 1 drop into both eyes daily as needed.      cholestyramine-aspartame (QUESTRAN LIGHT) 4 gram PwPk Take 1 packet (4 g total) by mouth 3 (three) times daily. 270 packet 3    coenzyme Q10 100 mg capsule Take 100 mg by mouth every morning.      CREON 36,000-114,000- 180,000 unit CpDR Take 1 capsule by mouth 4 (four) times daily. 360 capsule 1    dextran 70-hypromellose (ARTIFICIAL TEARS,[id number],) 0.1-0.3 % Drop Place 1 drop into both eyes daily as needed.      ergocalciferol (VITAMIN D2) 50,000 unit Cap Take 1 capsule (50,000 Units total) by mouth every 7 days. 12 capsule 1    fish oil-omega-3 fatty acids 300-1,000 mg capsule Take 1 capsule by mouth every morning.      FLAXSEED OIL ORAL Take 1,200 mg by mouth every morning.      fluticasone propionate (FLONASE) 50 mcg/actuation nasal spray 2 sprays (100 mcg total) by Each Nostril route daily as needed for Allergies. 16 g 3    fluticasone-salmeterol diskus inhaler 250-50 mcg Inhale 1 puff into the lungs 2 (two) times daily. 3 each 1    furosemide (LASIX) 20 MG tablet Take 1 tablet (20 mg total) by mouth every morning. 90 tablet 1    ipratropium-albuterol (COMBIVENT RESPIMAT)  mcg/actuation inhaler Inhale 2 puffs into  the lungs every 4 (four) hours as needed. Rescue (Patient taking differently: Inhale 2 puffs into the lungs every 4 (four) hours as needed for Shortness of Breath. Rescue) 3 Package 1    loperamide (IMODIUM) 2 mg capsule Take 1 capsule (2 mg total) by mouth 4 (four) times daily as needed. 120 capsule 5    lovastatin (MEVACOR) 20 MG tablet Take 1 tablet (20 mg total) by mouth every evening. 90 tablet 1    meloxicam (MOBIC) 7.5 MG tablet Take 1 tablet (7.5 mg total) by mouth once daily. 90 tablet 1    metFORMIN (GLUCOPHAGE) 500 MG tablet Take 1 tablet (500 mg total) by mouth daily with breakfast. 90 tablet 1    metoprolol succinate (TOPROL-XL) 50 MG 24 hr tablet Take 1 tablet by mouth once daily 90 tablet 0    nitroGLYCERIN 0.2 mg/hr TD PT24 (NITRODUR) 0.2 mg/hr Place 1 patch onto the skin every morning.      nitroGLYCERIN 0.4 MG/DOSE TL SPRY (NITROLINGUAL) 400 mcg/spray spray Place 1 spray under the tongue every 5 (five) minutes as needed for Chest pain (DO NOT EXCEED A TOTAL OF 3 SPRAYS IN 15 MINUTES). 4.9 g 0    ondansetron (ZOFRAN-ODT) 4 MG TbDL Take 4 mg by mouth every 6 (six) hours as needed.      pantoprazole (PROTONIX) 20 MG tablet Take 1 tablet (20 mg total) by mouth once daily. 90 tablet 1    PNV NO.115/IRON FUMARATE/FA (PRENATAL #115-IRON-FOLIC ACID ORAL) Take 1 tablet by mouth every morning.       spironolactone (ALDACTONE) 25 MG tablet Take 1 tablet (25 mg total) by mouth once daily. 90 tablet 1    temazepam (RESTORIL) 15 mg Cap Take 1 capsule (15 mg total) by mouth every evening. 90 capsule 1    umeclidinium (INCRUSE ELLIPTA) 62.5 mcg/actuation DsDv Inhale 62.5 mcg into the lungs every morning. Controller      vit C/E/Zn/coppr/lutein/zeaxan (PRESERVISION AREDS-2 ORAL) Take 1 capsule by mouth every morning.      BANOPHEN 25 mg capsule TAKE 2 CAPSULES BY MOUTH 1 HOUR PRIOR TO TEST  0     No current facility-administered medications for this visit.          Last PFT:  1/2018   Last CT:09/2020  "  Some solid nodule in the lingula, slightly increased in size from  the previous exam concerning for for malignancy, as previously  described (and less likely infection).  2. New spiculated nodule in the left upper lobe concerning for  malignancy. Consider PET/CT and/or biopsy.  3. Unchanged groundglass nodule in the central right lobe, possibly an  additional site of disease.  3. Moderate to severe apical centrilobular predominant emphysematous  lung disease.         General: Feeling Well.  Eyes: Vision is good.  ENT:  Left ear hurts    Heart:: No chest pain or palpitations.  Lungs: breathing is stable  GI: Reflux   :  No issues   Musculoskeletal: No joint pain or myalgias.  Skin: No lesions or rashes.  Neuro: No headaches or neuropathy.  Lymph: swelling to legs  Psych: No anxiety or depression.  Endo: weight loss     OBJECTIVE:      BP (!) 170/100 (BP Location: Left arm, Patient Position: Sitting, BP Method: Small (Manual))   Pulse (!) 56   Temp 96.8 °F (36 °C)   Ht 5' 1" (1.549 m)   Wt 82.1 kg (181 lb)   SpO2 99%   BMI 34.20 kg/m²     Physical Exam  GENERAL: Older patient in no distress.  HEENT: Pupils equal and reactive. Extraocular movements intact. Nose intact.      Pharynx moist.    NECK: Supple.   HEART: Regular rate and rhythm. No murmur or gallop auscultated.  LUNGS: Clear but decreased throughout  to auscultation and percussion. Lung excursion symmetrical. No change in fremitus. No adventitial noises.  ABDOMEN: Bowel sounds present. Non-tender, no masses palpated.  EXTREMITIES: Normal muscle tone and joint movement, no cyanosis or clubbing.   LYMPHATICS: No adenopathy palpated, 2+ pitting edema to legs and feet  SKIN: Dry, intact, no lesions.   NEURO: Cranial nerves II-XII intact. Motor strength 5/5 bilaterally, upper and lower extremities.  PSYCH: Appropriate affect.    Assessment:       1. Chronic obstructive pulmonary disease, unspecified COPD type    2. Chronic hypoxemic respiratory failure  "   3. Pulmonary nodules          Plan:       Chronic obstructive pulmonary disease, unspecified COPD type    Chronic hypoxemic respiratory failure    Pulmonary nodules             Continue the Advair twice a day   Continue the Incruse daily   Elevate legs  Call and see if cardiologist and see if can get in sooner for swelling   Repeat CT in 6 months   Follow up in about 6 months (around 3/9/2021).

## 2020-09-09 NOTE — TELEPHONE ENCOUNTER
----- Message from Khadar Dwyer MD sent at 9/5/2020  4:06 PM CDT -----  Call patient.  Blood sugars normal.  Kidney function is mildly decreased.  Liver enzymes look good.  Cholesterol excellent at 131 triglycerides 66.  CBC shows mild anemia is present hematocrit 36.5.  Continue current medications

## 2020-09-09 NOTE — PATIENT INSTRUCTIONS
Continue the Advair twice a day   Continue the Incruse daily   Elevate legs  Call and see if cardiologist and see if can get in sooner for swelling   Repeat CT in 6 months

## 2020-09-10 NOTE — TELEPHONE ENCOUNTER
Spoke to patient with results verbatim per Dr Dwyer. Verbalized understanding on all. Patient also asked that I call her daughter to schedule an appt. Scheduled 9/21. This was first available that worked for her daughter, Marisol.

## 2020-09-16 ENCOUNTER — TELEPHONE (OUTPATIENT)
Dept: FAMILY MEDICINE | Facility: CLINIC | Age: 75
End: 2020-09-16

## 2020-09-16 NOTE — TELEPHONE ENCOUNTER
Pt calling in regarding her appt on 9/21. Pt stated  She is wanting to be seen sooner due to her bleeding from her rectum. Pt wanted us to give her daughter a call to see what her schedule is like. Let pt know I will give her daughter a call. I was able to get pt scheduled for 11:40 on tomorrow

## 2020-09-17 ENCOUNTER — HOSPITAL ENCOUNTER (OUTPATIENT)
Dept: RADIOLOGY | Facility: HOSPITAL | Age: 75
Discharge: HOME | End: 2020-09-17
Attending: NURSE PRACTITIONER
Payer: MEDICARE

## 2020-09-17 ENCOUNTER — OFFICE VISIT (OUTPATIENT)
Dept: FAMILY MEDICINE | Facility: CLINIC | Age: 75
End: 2020-09-17
Payer: MEDICARE

## 2020-09-17 VITALS
SYSTOLIC BLOOD PRESSURE: 102 MMHG | HEART RATE: 52 BPM | DIASTOLIC BLOOD PRESSURE: 38 MMHG | HEIGHT: 61 IN | OXYGEN SATURATION: 97 % | BODY MASS INDEX: 34.2 KG/M2

## 2020-09-17 DIAGNOSIS — K92.1 BLOODY STOOL: Primary | ICD-10-CM

## 2020-09-17 DIAGNOSIS — J96.11 CHRONIC HYPOXEMIC RESPIRATORY FAILURE: ICD-10-CM

## 2020-09-17 DIAGNOSIS — Z23 NEED FOR INFLUENZA VACCINATION: ICD-10-CM

## 2020-09-17 DIAGNOSIS — N18.30 CKD (CHRONIC KIDNEY DISEASE) STAGE 3, GFR 30-59 ML/MIN: ICD-10-CM

## 2020-09-17 DIAGNOSIS — R10.9 ABDOMINAL PAIN, UNSPECIFIED ABDOMINAL LOCATION: ICD-10-CM

## 2020-09-17 DIAGNOSIS — K92.1 BLOODY STOOL: ICD-10-CM

## 2020-09-17 DIAGNOSIS — J44.9 CHRONIC OBSTRUCTIVE PULMONARY DISEASE, UNSPECIFIED COPD TYPE: ICD-10-CM

## 2020-09-17 DIAGNOSIS — D64.9 ANEMIA, UNSPECIFIED TYPE: ICD-10-CM

## 2020-09-17 PROCEDURE — 90662 IIV NO PRSV INCREASED AG IM: CPT | Mod: S$GLB,,, | Performed by: NURSE PRACTITIONER

## 2020-09-17 PROCEDURE — 90662 FLU VACCINE - QUADRIVALENT - HIGH DOSE (65+) PRESERVATIVE FREE IM: ICD-10-PCS | Mod: S$GLB,,, | Performed by: NURSE PRACTITIONER

## 2020-09-17 PROCEDURE — G0008 FLU VACCINE - QUADRIVALENT - HIGH DOSE (65+) PRESERVATIVE FREE IM: ICD-10-PCS | Mod: S$GLB,,, | Performed by: NURSE PRACTITIONER

## 2020-09-17 PROCEDURE — 99214 PR OFFICE/OUTPT VISIT, EST, LEVL IV, 30-39 MIN: ICD-10-PCS | Mod: 25,S$GLB,, | Performed by: NURSE PRACTITIONER

## 2020-09-17 PROCEDURE — G0008 ADMIN INFLUENZA VIRUS VAC: HCPCS | Mod: S$GLB,,, | Performed by: NURSE PRACTITIONER

## 2020-09-17 PROCEDURE — 99214 OFFICE O/P EST MOD 30 MIN: CPT | Mod: 25,S$GLB,, | Performed by: NURSE PRACTITIONER

## 2020-09-17 RX ORDER — FUROSEMIDE 20 MG/1
20 TABLET ORAL EVERY MORNING
Qty: 90 TABLET | Refills: 1 | Status: CANCELLED | OUTPATIENT
Start: 2020-09-17

## 2020-09-17 RX ORDER — LOVASTATIN 20 MG/1
20 TABLET ORAL NIGHTLY
Qty: 90 TABLET | Refills: 1 | Status: CANCELLED | OUTPATIENT
Start: 2020-09-17

## 2020-09-17 RX ORDER — METOPROLOL SUCCINATE 50 MG/1
50 TABLET, EXTENDED RELEASE ORAL DAILY
Qty: 90 TABLET | Refills: 1 | Status: CANCELLED | OUTPATIENT
Start: 2020-09-17

## 2020-09-17 RX ORDER — CIPROFLOXACIN 250 MG/1
250 TABLET, FILM COATED ORAL 2 TIMES DAILY
Qty: 20 TABLET | Refills: 0 | Status: SHIPPED | OUTPATIENT
Start: 2020-09-17 | End: 2020-12-07

## 2020-09-17 RX ORDER — ERGOCALCIFEROL 1.25 MG/1
50000 CAPSULE ORAL
Qty: 12 CAPSULE | Refills: 1 | Status: CANCELLED | OUTPATIENT
Start: 2020-09-17

## 2020-09-17 RX ORDER — METRONIDAZOLE 500 MG/1
500 TABLET ORAL 3 TIMES DAILY
Qty: 30 TABLET | Refills: 0 | Status: SHIPPED | OUTPATIENT
Start: 2020-09-17 | End: 2020-12-07

## 2020-09-17 RX ORDER — PANTOPRAZOLE SODIUM 20 MG/1
20 TABLET, DELAYED RELEASE ORAL DAILY
Qty: 90 TABLET | Refills: 1 | Status: CANCELLED | OUTPATIENT
Start: 2020-09-17

## 2020-09-17 RX ORDER — METFORMIN HYDROCHLORIDE 500 MG/1
500 TABLET ORAL
Qty: 90 TABLET | Refills: 1 | Status: CANCELLED | OUTPATIENT
Start: 2020-09-17

## 2020-09-17 RX ORDER — MELOXICAM 7.5 MG/1
7.5 TABLET ORAL DAILY
Qty: 90 TABLET | Refills: 1 | Status: CANCELLED | OUTPATIENT
Start: 2020-09-17

## 2020-09-17 NOTE — PROGRESS NOTES
Patient ID: Marisol Kerr is a 75 y.o. female.    Chief Complaint: GI Problem (rectal bleeding, diarrhea and vomiting x2days ago, legs swelling lower, brought bottles// SW)    Pt here for sick visit- reports started with nausea on Tuesday which isn't actually unusual for her- took her nausea medicine and shortly thereafter had sudden urge to go to the bathroom. Went and sat on toilet and had bloody diarrhea- bright red blood in toilet and also had sudden nausea/vomited 3 times.  Reports had another bloody stool later that evening however since then has been passing lot a gas with just small amounts of maroonish mucus noted and underwear.  Has not had any further stool.  States appetite is decreased slightly last couple days, though is drinking fluids, no further vomiting since Tuesday.  She denies fever or chills, no abdominal pain  Patient with history of colon cancer status post resection almost 30 years ago.  Her last colonoscopy was September of 2019 in the hospital for diarrhea.  Review of that note indicates moderate to severe colitis and at that time Dr. Kramer had recommended 1 week follow-up with Dr. Alvarez in however she states she never did follow-up.  Pathology from that colonoscopy showed colitis consistent with ischemia.          Past Medical History:   Diagnosis Date    CAD (coronary artery disease)     Cancer     colon    CHF (congestive heart failure)     Colitis     Colon cancer     COPD (chronic obstructive pulmonary disease)     Decreased hearing     Diabetes mellitus     Diabetes mellitus, type 2     Hypercholesterolemia     Hypertension     Hypoxemia     Insomnia     Obesity     Osteoarthritis      Past Surgical History:   Procedure Laterality Date    CHOLECYSTECTOMY      COLECTOMY      CORONARY STENT PLACEMENT      EXTERNAL EAR SURGERY      EYE SURGERY      FLEXIBLE SIGMOIDOSCOPY N/A 9/19/2019    Procedure: SIGMOIDOSCOPY, FLEXIBLE;  Surgeon: Biju Kramer III, MD;   Location: [facility];  Service: Endoscopy;  Laterality: N/A;    HYSTERECTOMY      partial         Tobacco History:  reports that she quit smoking about 14 years ago. Her smoking use included cigarettes. She started smoking about 56 years ago. She has a 150.00 pack-year smoking history. She has never used smokeless tobacco.      Review of patient's allergies indicates:   Allergen Reactions    Iodinated contrast media     Strawberries [strawberry] Hives and Itching       Current Outpatient Medications:     albuterol (ACCUNEB) 1.25 mg/3 mL Nebu, Take 3 mLs (1.25 mg total) by nebulization every 6 (six) hours as needed. Rescue, Disp: 1 Box, Rfl: 8    benazepriL (LOTENSIN) 40 MG tablet, Take 1 tablet (40 mg total) by mouth once daily., Disp: 90 tablet, Rfl: 1    coenzyme Q10 100 mg capsule, Take 100 mg by mouth every morning., Disp: , Rfl:     CREON 36,000-114,000- 180,000 unit CpDR, Take 1 capsule by mouth 4 (four) times daily., Disp: 360 capsule, Rfl: 1    dextran 70-hypromellose (ARTIFICIAL TEARS,[id number],) 0.1-0.3 % Drop, Place 1 drop into both eyes daily as needed., Disp: , Rfl:     ergocalciferol (VITAMIN D2) 50,000 unit Cap, Take 1 capsule (50,000 Units total) by mouth every 7 days., Disp: 12 capsule, Rfl: 1    fish oil-omega-3 fatty acids 300-1,000 mg capsule, Take 1 capsule by mouth every morning., Disp: , Rfl:     FLAXSEED OIL ORAL, Take 1,200 mg by mouth every morning., Disp: , Rfl:     furosemide (LASIX) 20 MG tablet, Take 1 tablet (20 mg total) by mouth every morning., Disp: 90 tablet, Rfl: 1    loperamide (IMODIUM) 2 mg capsule, Take 1 capsule (2 mg total) by mouth 4 (four) times daily as needed., Disp: 120 capsule, Rfl: 5    lovastatin (MEVACOR) 20 MG tablet, Take 1 tablet (20 mg total) by mouth every evening., Disp: 90 tablet, Rfl: 1    meloxicam (MOBIC) 7.5 MG tablet, Take 1 tablet (7.5 mg total) by mouth once daily., Disp: 90 tablet, Rfl: 1    metFORMIN (GLUCOPHAGE) 500 MG tablet, Take  1 tablet (500 mg total) by mouth daily with breakfast., Disp: 90 tablet, Rfl: 1    metoprolol succinate (TOPROL-XL) 50 MG 24 hr tablet, Take 1 tablet by mouth once daily, Disp: 90 tablet, Rfl: 0    nitroGLYCERIN 0.4 MG/DOSE TL SPRY (NITROLINGUAL) 400 mcg/spray spray, Place 1 spray under the tongue every 5 (five) minutes as needed for Chest pain (DO NOT EXCEED A TOTAL OF 3 SPRAYS IN 15 MINUTES)., Disp: 4.9 g, Rfl: 0    ondansetron (ZOFRAN-ODT) 4 MG TbDL, Take 4 mg by mouth every 6 (six) hours as needed., Disp: , Rfl:     pantoprazole (PROTONIX) 20 MG tablet, Take 1 tablet (20 mg total) by mouth once daily., Disp: 90 tablet, Rfl: 1    PNV NO.115/IRON FUMARATE/FA (PRENATAL #115-IRON-FOLIC ACID ORAL), Take 1 tablet by mouth every morning. , Disp: , Rfl:     vit C/E/Zn/coppr/lutein/zeaxan (PRESERVISION AREDS-2 ORAL), Take 1 capsule by mouth every morning., Disp: , Rfl:     acetaminophen (TYLENOL) 500 MG tablet, Take 500 mg by mouth every 6 (six) hours as needed for Pain. , Disp: , Rfl:     albuterol (VENTOLIN HFA) 90 mcg/actuation inhaler, Inhale 2 puffs into the lungs every 6 (six) hours as needed for Wheezing or Shortness of Breath. Rescue, Disp: 36 g, Rfl: 3    aspirin (ECOTRIN) 81 MG EC tablet, Take 81 mg by mouth every morning. , Disp: , Rfl:     BANOPHEN 25 mg capsule, TAKE 2 CAPSULES BY MOUTH 1 HOUR PRIOR TO TEST, Disp: , Rfl: 0    bromfenac (PROLENSA) 0.07 % Drop, Place 1 drop into both eyes daily as needed., Disp: , Rfl:     cholestyramine-aspartame (QUESTRAN LIGHT) 4 gram PwPk, Take 1 packet (4 g total) by mouth 3 (three) times daily., Disp: 270 packet, Rfl: 3    ciprofloxacin HCl (CIPRO) 250 MG tablet, Take 1 tablet (250 mg total) by mouth 2 (two) times daily., Disp: 20 tablet, Rfl: 0    fluticasone propionate (FLONASE) 50 mcg/actuation nasal spray, 2 sprays (100 mcg total) by Each Nostril route daily as needed for Allergies., Disp: 16 g, Rfl: 3    fluticasone-salmeterol diskus inhaler 250-50  "mcg, Inhale 1 puff into the lungs 2 (two) times daily., Disp: 3 each, Rfl: 1    ipratropium-albuterol (COMBIVENT RESPIMAT)  mcg/actuation inhaler, Inhale 2 puffs into the lungs every 4 (four) hours as needed. Rescue (Patient taking differently: Inhale 2 puffs into the lungs every 4 (four) hours as needed for Shortness of Breath. Rescue), Disp: 3 Package, Rfl: 1    metroNIDAZOLE (FLAGYL) 500 MG tablet, Take 1 tablet (500 mg total) by mouth 3 (three) times daily., Disp: 30 tablet, Rfl: 0    nitroGLYCERIN 0.2 mg/hr TD PT24 (NITRODUR) 0.2 mg/hr, Place 1 patch onto the skin every morning., Disp: , Rfl:     spironolactone (ALDACTONE) 25 MG tablet, Take 1 tablet (25 mg total) by mouth once daily., Disp: 90 tablet, Rfl: 1    temazepam (RESTORIL) 15 mg Cap, Take 1 capsule (15 mg total) by mouth every evening., Disp: 90 capsule, Rfl: 1    umeclidinium (INCRUSE ELLIPTA) 62.5 mcg/actuation DsDv, Inhale 62.5 mcg into the lungs every morning. Controller, Disp: , Rfl:     Review of Systems   Constitutional: Negative for chills, fever and unexpected weight change.   Respiratory: Positive for shortness of breath (At baseline) and wheezing.    Cardiovascular: Positive for leg swelling ( worse couple weeks ago but now improved). Negative for chest pain and palpitations.   Gastrointestinal: Positive for blood in stool, nausea and vomiting. Negative for abdominal pain and rectal pain.   Genitourinary: Negative for dysuria and hematuria.   Neurological: Negative for dizziness and syncope.          Objective:      Vitals:    09/17/20 1158   BP: (!) 102/38   Pulse: (!) 52   SpO2: 97%   Height: 5' 1" (1.549 m)     Physical Exam  Constitutional:       General: She is not in acute distress.     Appearance: She is obese. She is ill-appearing (Chronically ill appearing WF, sitting in wheelchair with portable oxygen).   HENT:      Mouth/Throat:      Mouth: Mucous membranes are moist.   Cardiovascular:      Rate and Rhythm: Normal " rate and regular rhythm.   Pulmonary:      Effort: Pulmonary effort is normal. No respiratory distress.      Comments: Breath sounds diminished throughout  Abdominal:      Palpations: Abdomen is soft.      Tenderness: There is no abdominal tenderness. There is no guarding.   Genitourinary:     Comments: Rectal exam returned small amount of maroon stool/mucus; no hemorrhoids; chronic excoriation to sacrum  Musculoskeletal:      Right lower leg: Edema (1-2+ pitting edema lower calves wtih chronic skin changes- peau d'orange, no erythema/warmth) present.      Left lower leg: Edema present.   Skin:     General: Skin is warm and dry.   Neurological:      General: No focal deficit present.      Mental Status: She is alert and oriented to person, place, and time.           Assessment:       1. Bloody stool    2. Abdominal pain, unspecified abdominal location    3. Chronic obstructive pulmonary disease, unspecified COPD type    4. Chronic hypoxemic respiratory failure    5. Need for influenza vaccination    6. CKD (chronic kidney disease) stage 3, GFR 30-59 ml/min    7. Anemia, unspecified type           Plan:       Bloody stool  Comments:  Discussed with patient/daughter/Dr. Dwyer.  DDx includes diverticular bleed vs colitis.  Patient without acute findings today and VSS- will send for CT scan today and start antibiotics however patient/daughter cautioned if she has recurrent bloody stool, fever or abdominal pain then recommend emergency room care  Orders:  -     Cancel: CT Abdomen Pelvis W Wo Contrast; Future; Expected date: 09/17/2020  -     CT Abdomen Pelvis W Wo Contrast; Future; Expected date: 09/17/2020  -     ciprofloxacin HCl (CIPRO) 250 MG tablet; Take 1 tablet (250 mg total) by mouth 2 (two) times daily.  Dispense: 20 tablet; Refill: 0  -     metroNIDAZOLE (FLAGYL) 500 MG tablet; Take 1 tablet (500 mg total) by mouth 3 (three) times daily.  Dispense: 30 tablet; Refill: 0    Abdominal pain, unspecified abdominal  location  -     Cancel: CT Abdomen Pelvis W Wo Contrast; Future; Expected date: 09/17/2020  -     CT Abdomen Pelvis W Wo Contrast; Future; Expected date: 09/17/2020    Need for influenza vaccination  -     Influenza - Quadrivalent - High Dose (65+) (PF) (IM)    COPD  -stable    Chronic hypoxemic respiratory failure  -stable on continuous O2    CKD3  -Reviewed recent labs with mild declining kidney function therefore no IV contrast with CT scan.  Advised to avoid NSAIDs and increase water intake    Anemia  Stable on recent labs though advised if she has any recurrenct bloody stool needs updated labs    Follow up in about 1 week (around 9/24/2020).        9/17/2020 Antonieta Marquez NP

## 2020-09-21 ENCOUNTER — HOSPITAL ENCOUNTER (OUTPATIENT)
Dept: RADIOLOGY | Facility: HOSPITAL | Age: 75
Discharge: HOME | End: 2020-09-21
Attending: NURSE PRACTITIONER
Payer: MEDICARE

## 2020-09-21 ENCOUNTER — TELEPHONE (OUTPATIENT)
Dept: FAMILY MEDICINE | Facility: CLINIC | Age: 75
End: 2020-09-21

## 2020-09-21 DIAGNOSIS — R10.9 ABDOMINAL PAIN, UNSPECIFIED ABDOMINAL LOCATION: ICD-10-CM

## 2020-09-21 DIAGNOSIS — K92.1 BLOODY STOOL: ICD-10-CM

## 2020-09-21 PROCEDURE — 74176 CT ABD & PELVIS W/O CONTRAST: CPT | Mod: TC

## 2020-09-21 NOTE — TELEPHONE ENCOUNTER
----- Message from Antonieta Marquez NP sent at 9/21/2020  1:10 PM CDT -----  Please call patient/family let them know CT the abdomen and pelvis does not show any acute findings such as colitis or diverticulitis.  The bowel does have a moderate to large volume of stool and gas seen and looks like she might constipated.  Ask how her bowel movements have been the last couple days.  If she is still taking the cholestyramine 3 times a day recommend holding that for couple days and she may want to MiraLax daily till she can a good bowel movement

## 2020-09-21 NOTE — TELEPHONE ENCOUNTER
Spoke to patient's daughter, Marisol, who understood the message and states that her mother is no longer constipated and that she will remember the treatment suggested if she needs it.  Usually, her mom has the opposite problem/ba

## 2020-09-21 NOTE — PROGRESS NOTES
Please call patient/family let them know CT the abdomen and pelvis does not show any acute findings such as colitis or diverticulitis.  The bowel does have a moderate to large volume of stool and gas seen and looks like she might constipated.  Ask how her bowel movements have been the last couple days.  If she is still taking the cholestyramine 3 times a day recommend holding that for couple days and she may want to MiraLax daily till she can a good bowel movement

## 2020-09-23 ENCOUNTER — OFFICE VISIT (OUTPATIENT)
Dept: FAMILY MEDICINE | Facility: CLINIC | Age: 75
End: 2020-09-23
Payer: MEDICARE

## 2020-09-23 VITALS
WEIGHT: 176 LBS | HEIGHT: 61 IN | BODY MASS INDEX: 33.23 KG/M2 | DIASTOLIC BLOOD PRESSURE: 62 MMHG | SYSTOLIC BLOOD PRESSURE: 136 MMHG | TEMPERATURE: 99 F | HEART RATE: 60 BPM

## 2020-09-23 DIAGNOSIS — E78.5 DYSLIPIDEMIA: ICD-10-CM

## 2020-09-23 DIAGNOSIS — N18.30 CKD (CHRONIC KIDNEY DISEASE) STAGE 3, GFR 30-59 ML/MIN: ICD-10-CM

## 2020-09-23 DIAGNOSIS — K86.1 CHRONIC PANCREATITIS, UNSPECIFIED PANCREATITIS TYPE: ICD-10-CM

## 2020-09-23 DIAGNOSIS — K58.2 IRRITABLE BOWEL SYNDROME WITH BOTH CONSTIPATION AND DIARRHEA: ICD-10-CM

## 2020-09-23 DIAGNOSIS — I89.0 LYMPHEDEMA: ICD-10-CM

## 2020-09-23 DIAGNOSIS — J44.9 CHRONIC OBSTRUCTIVE PULMONARY DISEASE, UNSPECIFIED COPD TYPE: ICD-10-CM

## 2020-09-23 DIAGNOSIS — K92.1 BLOODY STOOL: Primary | ICD-10-CM

## 2020-09-23 DIAGNOSIS — J96.11 CHRONIC HYPOXEMIC RESPIRATORY FAILURE: ICD-10-CM

## 2020-09-23 PROCEDURE — 99213 PR OFFICE/OUTPT VISIT, EST, LEVL III, 20-29 MIN: ICD-10-PCS | Mod: S$GLB,,, | Performed by: NURSE PRACTITIONER

## 2020-09-23 PROCEDURE — 99213 OFFICE O/P EST LOW 20 MIN: CPT | Mod: S$GLB,,, | Performed by: NURSE PRACTITIONER

## 2020-09-23 NOTE — PROGRESS NOTES
Patient ID: Marisol Kerr is a 75 y.o. female.    Chief Complaint: Follow-up (1wk, brought bottles, Mammo and DEXA declined for now// SW)    Pt here for 1 week f/u for bloody stool. Pt reports since last week no further bloody stool seen. Still c/oing of abd cramping with excess gas and diarrhea though family reports these have been chronic c/o's for several years.  Patient reports will use cholestyramine 1 to 2 times a day depending on how her diarrhea is doing.  Also uses over-the-counter antidiarrheal medicine.  At times will fluctuate over to constipation though denies overuse of laxatives.  CT scan earlier this week basically normal, did show some moderate to large volume of stool.  Patient reports since she drink the oral contrast she had worsening diarrhea for a day or two.  Denies fever/chills, no change in appetite          Past Medical History:   Diagnosis Date    CAD (coronary artery disease)     Cancer     colon    CHF (congestive heart failure)     Colitis     Colon cancer     COPD (chronic obstructive pulmonary disease)     Decreased hearing     Diabetes mellitus     Diabetes mellitus, type 2     Hypercholesterolemia     Hypertension     Hypoxemia     Insomnia     Obesity     Osteoarthritis      Past Surgical History:   Procedure Laterality Date    CHOLECYSTECTOMY      COLECTOMY      CORONARY STENT PLACEMENT      EXTERNAL EAR SURGERY      EYE SURGERY      FLEXIBLE SIGMOIDOSCOPY N/A 9/19/2019    Procedure: SIGMOIDOSCOPY, FLEXIBLE;  Surgeon: Biju Kramer III, MD;  Location: [facility];  Service: Endoscopy;  Laterality: N/A;    HYSTERECTOMY      partial         Tobacco History:  reports that she quit smoking about 14 years ago. Her smoking use included cigarettes. She started smoking about 56 years ago. She has a 150.00 pack-year smoking history. She has never used smokeless tobacco.      Review of patient's allergies indicates:   Allergen Reactions    Iodinated contrast media      Strawberries [strawberry] Hives and Itching       Current Outpatient Medications:     acetaminophen (TYLENOL) 500 MG tablet, Take 500 mg by mouth every 6 (six) hours as needed for Pain. , Disp: , Rfl:     albuterol (ACCUNEB) 1.25 mg/3 mL Nebu, Take 3 mLs (1.25 mg total) by nebulization every 6 (six) hours as needed. Rescue, Disp: 1 Box, Rfl: 8    albuterol (VENTOLIN HFA) 90 mcg/actuation inhaler, Inhale 2 puffs into the lungs every 6 (six) hours as needed for Wheezing or Shortness of Breath. Rescue, Disp: 36 g, Rfl: 3    aspirin (ECOTRIN) 81 MG EC tablet, Take 81 mg by mouth every morning. , Disp: , Rfl:     BANOPHEN 25 mg capsule, TAKE 2 CAPSULES BY MOUTH 1 HOUR PRIOR TO TEST, Disp: , Rfl: 0    benazepriL (LOTENSIN) 40 MG tablet, Take 1 tablet (40 mg total) by mouth once daily., Disp: 90 tablet, Rfl: 1    bromfenac (PROLENSA) 0.07 % Drop, Place 1 drop into both eyes daily as needed., Disp: , Rfl:     cholestyramine-aspartame (QUESTRAN LIGHT) 4 gram PwPk, Take 1 packet (4 g total) by mouth 3 (three) times daily., Disp: 270 packet, Rfl: 3    ciprofloxacin HCl (CIPRO) 250 MG tablet, Take 1 tablet (250 mg total) by mouth 2 (two) times daily., Disp: 20 tablet, Rfl: 0    coenzyme Q10 100 mg capsule, Take 100 mg by mouth every morning., Disp: , Rfl:     CREON 36,000-114,000- 180,000 unit CpDR, Take 1 capsule by mouth 4 (four) times daily., Disp: 360 capsule, Rfl: 1    dextran 70-hypromellose (ARTIFICIAL TEARS,[id number],) 0.1-0.3 % Drop, Place 1 drop into both eyes daily as needed., Disp: , Rfl:     ergocalciferol (VITAMIN D2) 50,000 unit Cap, Take 1 capsule (50,000 Units total) by mouth every 7 days., Disp: 12 capsule, Rfl: 1    fish oil-omega-3 fatty acids 300-1,000 mg capsule, Take 1 capsule by mouth every morning., Disp: , Rfl:     FLAXSEED OIL ORAL, Take 1,200 mg by mouth every morning., Disp: , Rfl:     fluticasone propionate (FLONASE) 50 mcg/actuation nasal spray, 2 sprays (100 mcg total) by  Each Nostril route daily as needed for Allergies., Disp: 16 g, Rfl: 3    fluticasone-salmeterol diskus inhaler 250-50 mcg, Inhale 1 puff into the lungs 2 (two) times daily., Disp: 3 each, Rfl: 1    furosemide (LASIX) 20 MG tablet, Take 1 tablet (20 mg total) by mouth every morning., Disp: 90 tablet, Rfl: 1    ipratropium-albuterol (COMBIVENT RESPIMAT)  mcg/actuation inhaler, Inhale 2 puffs into the lungs every 4 (four) hours as needed. Rescue (Patient taking differently: Inhale 2 puffs into the lungs every 4 (four) hours as needed for Shortness of Breath. Rescue), Disp: 3 Package, Rfl: 1    loperamide (IMODIUM) 2 mg capsule, Take 1 capsule (2 mg total) by mouth 4 (four) times daily as needed., Disp: 120 capsule, Rfl: 5    lovastatin (MEVACOR) 20 MG tablet, Take 1 tablet (20 mg total) by mouth every evening., Disp: 90 tablet, Rfl: 1    meloxicam (MOBIC) 7.5 MG tablet, Take 1 tablet (7.5 mg total) by mouth once daily., Disp: 90 tablet, Rfl: 1    metFORMIN (GLUCOPHAGE) 500 MG tablet, Take 1 tablet (500 mg total) by mouth daily with breakfast., Disp: 90 tablet, Rfl: 1    metoprolol succinate (TOPROL-XL) 50 MG 24 hr tablet, Take 1 tablet by mouth once daily, Disp: 90 tablet, Rfl: 0    metroNIDAZOLE (FLAGYL) 500 MG tablet, Take 1 tablet (500 mg total) by mouth 3 (three) times daily., Disp: 30 tablet, Rfl: 0    nitroGLYCERIN 0.2 mg/hr TD PT24 (NITRODUR) 0.2 mg/hr, Place 1 patch onto the skin every morning., Disp: , Rfl:     nitroGLYCERIN 0.4 MG/DOSE TL SPRY (NITROLINGUAL) 400 mcg/spray spray, Place 1 spray under the tongue every 5 (five) minutes as needed for Chest pain (DO NOT EXCEED A TOTAL OF 3 SPRAYS IN 15 MINUTES)., Disp: 4.9 g, Rfl: 0    ondansetron (ZOFRAN-ODT) 4 MG TbDL, Take 4 mg by mouth every 6 (six) hours as needed., Disp: , Rfl:     pantoprazole (PROTONIX) 20 MG tablet, Take 1 tablet (20 mg total) by mouth once daily., Disp: 90 tablet, Rfl: 1    PNV NO.115/IRON FUMARATE/FA (PRENATAL  "#115-IRON-FOLIC ACID ORAL), Take 1 tablet by mouth every morning. , Disp: , Rfl:     spironolactone (ALDACTONE) 25 MG tablet, Take 1 tablet (25 mg total) by mouth once daily., Disp: 90 tablet, Rfl: 1    temazepam (RESTORIL) 15 mg Cap, Take 1 capsule (15 mg total) by mouth every evening., Disp: 90 capsule, Rfl: 1    umeclidinium (INCRUSE ELLIPTA) 62.5 mcg/actuation DsDv, Inhale 62.5 mcg into the lungs every morning. Controller, Disp: , Rfl:     vit C/E/Zn/coppr/lutein/zeaxan (PRESERVISION AREDS-2 ORAL), Take 1 capsule by mouth every morning., Disp: , Rfl:     Review of Systems   Constitutional: Negative for chills, fever and unexpected weight change.   Respiratory: Positive for shortness of breath (At baseline) and wheezing.    Cardiovascular: Positive for leg swelling (Chronic, family reports she just received new zipper compression socks she will start applying). Negative for chest pain and palpitations.   Gastrointestinal: Positive for abdominal pain (Intermittent cramping with excess gas). Negative for blood in stool, nausea, rectal pain and vomiting.   Genitourinary: Negative for dysuria and hematuria.   Neurological: Negative for dizziness and syncope.          Objective:      Vitals:    09/23/20 1159   BP: 136/62   Pulse: 60   Temp: 99 °F (37.2 °C)   Weight: 79.8 kg (176 lb)   Height: 5' 1" (1.549 m)     Physical Exam  Constitutional:       General: She is not in acute distress.     Appearance: She is obese. She is ill-appearing (Chronically ill appearing WF, sitting in wheelchair with portable oxygen).   HENT:      Mouth/Throat:      Mouth: Mucous membranes are moist.   Cardiovascular:      Rate and Rhythm: Normal rate and regular rhythm.      Heart sounds: No murmur.   Pulmonary:      Effort: Pulmonary effort is normal. No respiratory distress.      Comments: Breath sounds diminished throughout  Abdominal:      General: There is no distension.      Palpations: Abdomen is soft.      Tenderness: There is " no abdominal tenderness. There is no guarding.   Musculoskeletal:      Right lower leg: Edema (1-2+ pitting edema lower calves wtih chronic skin changes- peau d'orange, no erythema/warmth) present.      Left lower leg: Edema present.   Skin:     General: Skin is warm and dry.   Neurological:      General: No focal deficit present.      Mental Status: She is alert and oriented to person, place, and time.           Assessment:       1. Bloody stool    2. Irritable bowel syndrome with both constipation and diarrhea    3. Chronic obstructive pulmonary disease, unspecified COPD type    4. Chronic hypoxemic respiratory failure    5. Lymphedema    6. Dyslipidemia    7. Chronic pancreatitis, unspecified pancreatitis type    8. CKD (chronic kidney disease) stage 3, GFR 30-59 ml/min           Plan:       Bloody stool  Comments:  No further bloody stool with CT scan negative, finish out antibiotics as prescribed and call for any recurrence    Irritable bowel syndrome with both constipation and diarrhea  Comments:  Discussed with patient/family trying to avoid overtreatment of diarrhea which can result in constipation    Chronic obstructive pulmonary disease, unspecified COPD type  Comments:  Stable at baseline    Chronic hypoxemic respiratory failure  Comments:  Stable on continuous O2    Lymphedema  Comments:  Encouraged to wear her new compression socks daily to help manage edema    Dyslipidemia  Comments:  Well controlled on recent labs    Chronic pancreatitis, unspecified pancreatitis type  Comments:  Discussed with patient 1 of the symptoms of chronic pancreatitis can be diarrhea, continue Creon and follow-up with GI    CKD (chronic kidney disease) stage 3, GFR 30-59 ml/min  Comments:  Reviewed recent labs with patient/family, overall stable though encouraged to drink more water      Follow up in about 3 months (around 12/23/2020) for cancel 9/30 appt, COPD, HTN, Dr. Dwyer next visit.        9/23/2020 Antonieta Marquez,  NP

## 2020-10-01 ENCOUNTER — OFFICE VISIT (OUTPATIENT)
Dept: CARDIOLOGY | Facility: CLINIC | Age: 75
End: 2020-10-01
Payer: MEDICARE

## 2020-10-01 VITALS
HEIGHT: 61 IN | HEART RATE: 42 BPM | OXYGEN SATURATION: 100 % | BODY MASS INDEX: 51.16 KG/M2 | TEMPERATURE: 97 F | DIASTOLIC BLOOD PRESSURE: 70 MMHG | WEIGHT: 271 LBS | SYSTOLIC BLOOD PRESSURE: 130 MMHG

## 2020-10-01 DIAGNOSIS — I10 ESSENTIAL HYPERTENSION: ICD-10-CM

## 2020-10-01 DIAGNOSIS — I25.10 ATHEROSCLEROSIS OF NATIVE CORONARY ARTERY OF NATIVE HEART WITHOUT ANGINA PECTORIS: ICD-10-CM

## 2020-10-01 DIAGNOSIS — E78.00 HYPERCHOLESTEROLEMIA: ICD-10-CM

## 2020-10-01 DIAGNOSIS — I50.32 CHRONIC DIASTOLIC CONGESTIVE HEART FAILURE: Chronic | ICD-10-CM

## 2020-10-01 PROCEDURE — 99214 PR OFFICE/OUTPT VISIT, EST, LEVL IV, 30-39 MIN: ICD-10-PCS | Mod: S$GLB,,, | Performed by: INTERNAL MEDICINE

## 2020-10-01 PROCEDURE — 99214 OFFICE O/P EST MOD 30 MIN: CPT | Mod: S$GLB,,, | Performed by: INTERNAL MEDICINE

## 2020-10-01 RX ORDER — FUROSEMIDE 40 MG/1
40 TABLET ORAL
Status: DISCONTINUED | OUTPATIENT
Start: 2020-10-01 | End: 2022-02-10 | Stop reason: HOSPADM

## 2020-10-01 NOTE — PROGRESS NOTES
Patient ID:  Marisol Kerr is a 75 y.o. female who presents for follow-up of Coronary Artery Disease, Hyperlipidemia, Congestive Heart Failure, and Hypertension      She has been doing well in general.  She denies any chest pains.  Her breathing has been stable.  She does have occasional lower extremities edema.  She is not taking to Lasix on a daily basis because she runs out of the medication and has to go to the bathroom very frequently.      Past Medical History:   Diagnosis Date    CAD (coronary artery disease)     Cancer     colon    CHF (congestive heart failure)     Colitis     Colon cancer     COPD (chronic obstructive pulmonary disease)     Decreased hearing     Diabetes mellitus     Diabetes mellitus, type 2     Hypercholesterolemia     Hypertension     Hypoxemia     Insomnia     Obesity     Osteoarthritis         Past Surgical History:   Procedure Laterality Date    CARDIAC CATHETERIZATION      CHOLECYSTECTOMY      COLECTOMY      CORONARY ANGIOPLASTY      CORONARY STENT PLACEMENT      EXTERNAL EAR SURGERY      EYE SURGERY      FLEXIBLE SIGMOIDOSCOPY N/A 9/19/2019    Procedure: SIGMOIDOSCOPY, FLEXIBLE;  Surgeon: Biju Kramer III, MD;  Location: [facility];  Service: Endoscopy;  Laterality: N/A;    HYSTERECTOMY      partial          Current Outpatient Medications   Medication Instructions    acetaminophen (TYLENOL) 500 mg, Oral, Every 6 hours PRN    albuterol (ACCUNEB) 1.25 mg, Nebulization, Every 6 hours PRN, Rescue    albuterol (VENTOLIN HFA) 90 mcg/actuation inhaler 2 puffs, Inhalation, Every 6 hours PRN, Rescue     aspirin (ECOTRIN) 81 mg, Oral, Every morning    BANOPHEN 25 mg capsule TAKE 2 CAPSULES BY MOUTH 1 HOUR PRIOR TO TEST    benazepriL (LOTENSIN) 40 mg, Oral, Daily    bromfenac (PROLENSA) 0.07 % Drop 1 drop, Both Eyes, Daily PRN    cholestyramine-aspartame (QUESTRAN LIGHT) 4 gram PwPk 4 g, Oral, 3 times daily    ciprofloxacin HCl (CIPRO) 250 mg, Oral,  2 times daily    coenzyme Q10 100 mg, Oral, Every morning    CREON 36,000-114,000- 180,000 unit CpDR 1 capsule, Oral, 4 times daily    dextran 70-hypromellose (ARTIFICIAL TEARS,[id number],) 0.1-0.3 % Drop 1 drop, Both Eyes, Daily PRN    ergocalciferol (VITAMIN D2) 50,000 Units, Oral, Every 7 days    fish oil-omega-3 fatty acids 300-1,000 mg capsule 1 capsule, Oral, Every morning    FLAXSEED OIL ORAL 1,200 mg, Oral, Every morning    fluticasone propionate (FLONASE) 100 mcg, Each Nostril, Daily PRN    fluticasone-salmeterol diskus inhaler 250-50 mcg 1 puff, Inhalation, 2 times daily    furosemide (LASIX) 20 mg, Oral, Every morning    ipratropium-albuterol (COMBIVENT RESPIMAT)  mcg/actuation inhaler 2 puffs, Inhalation, Every 4 hours PRN, Rescue    loperamide (IMODIUM) 2 mg, Oral, 4 times daily PRN    lovastatin (MEVACOR) 20 mg, Oral, Nightly    meloxicam (MOBIC) 7.5 mg, Oral, Daily    metFORMIN (GLUCOPHAGE) 500 mg, Oral, With breakfast    metoprolol succinate (TOPROL-XL) 50 MG 24 hr tablet Take 1 tablet by mouth once daily    metroNIDAZOLE (FLAGYL) 500 mg, Oral, 3 times daily    nitroGLYCERIN 0.2 mg/hr TD PT24 (NITRODUR) 0.2 mg/hr 1 patch, Transdermal, Every morning    nitroGLYCERIN 0.4 MG/DOSE TL SPRY (NITROLINGUAL) 400 mcg/spray spray 1 spray, Sublingual, Every 5 min PRN    ondansetron (ZOFRAN-ODT) 4 mg, Oral, Every 6 hours PRN    pantoprazole (PROTONIX) 20 mg, Oral, Daily    PNV NO.115/IRON FUMARATE/FA (PRENATAL #115-IRON-FOLIC ACID ORAL) 1 tablet, Oral, Every morning    spironolactone (ALDACTONE) 25 mg, Oral, Daily    temazepam (RESTORIL) 15 mg, Oral, Nightly    umeclidinium (INCRUSE ELLIPTA) 62.5 mcg, Inhalation, Every morning, Controller     vit C/E/Zn/coppr/lutein/zeaxan (PRESERVISION AREDS-2 ORAL) 1 capsule, Oral, Every morning        Review of patient's allergies indicates:   Allergen Reactions    Iodinated contrast media     Strawberries [strawberry] Hives and Itching     "    Review of Systems   Constitution: Negative for chills and fever.   HENT: Negative for congestion and sore throat.    Cardiovascular: Positive for leg swelling. Negative for chest pain.   Respiratory: Negative.  Negative for cough and shortness of breath.    Skin: Negative for itching and rash.   Musculoskeletal: Negative.  Negative for back pain and muscle cramps.   Gastrointestinal: Negative for abdominal pain and heartburn.   Neurological: Negative.  Negative for dizziness and weakness.   Psychiatric/Behavioral: Negative.  Negative for altered mental status and hallucinations.        Objective:     Vitals:    10/01/20 1024   BP: 130/70   BP Location: Left arm   Patient Position: Sitting   BP Method: Medium (Manual)   Pulse: (!) 42   Temp: 97.3 °F (36.3 °C)   SpO2: 100%  Comment: on O2   Weight: 122.9 kg (271 lb)   Height: 5' 1" (1.549 m)       Physical Exam   Constitutional: She is oriented to person, place, and time.   Overweight female in not acute distress   HENT:   Head: Normocephalic and atraumatic.   Eyes: Conjunctivae and EOM are normal.   Cardiovascular: Normal rate, regular rhythm and normal heart sounds.   Pulmonary/Chest: Effort normal and breath sounds normal.   Abdominal: Soft. Bowel sounds are normal.   Musculoskeletal:         General: Edema present.   Neurological: She is alert and oriented to person, place, and time.   Skin: Skin is warm and dry. There is erythema (In the lower extremities.).   Psychiatric: She has a normal mood and affect. Her behavior is normal. Judgment and thought content normal.   Nursing note and vitals reviewed.    CMP  Sodium   Date Value Ref Range Status   09/01/2020 141 136 - 145 mmol/L Final     Potassium   Date Value Ref Range Status   09/01/2020 4.4 3.5 - 5.1 mmol/L Final     Chloride   Date Value Ref Range Status   09/01/2020 103 95 - 110 mmol/L Final     CO2   Date Value Ref Range Status   09/01/2020 29 23 - 29 mmol/L Final     Glucose   Date Value Ref Range " Status   09/01/2020 86 70 - 110 mg/dL Final     BUN, Bld   Date Value Ref Range Status   09/01/2020 37 (H) 8 - 23 mg/dL Final     Creatinine   Date Value Ref Range Status   09/01/2020 1.5 (H) 0.5 - 1.4 mg/dL Final     Calcium   Date Value Ref Range Status   09/01/2020 9.4 8.7 - 10.5 mg/dL Final     Total Protein   Date Value Ref Range Status   09/01/2020 6.2 6.0 - 8.4 g/dL Final     Albumin   Date Value Ref Range Status   09/01/2020 3.7 3.5 - 5.2 g/dL Final     Total Bilirubin   Date Value Ref Range Status   09/01/2020 1.1 (H) 0.1 - 1.0 mg/dL Final     Comment:     For infants and newborns, interpretation of results should be based  on gestational age, weight and in agreement with clinical  observations.  Premature Infant recommended reference ranges:  Up to 24 hours.............<8.0 mg/dL  Up to 48 hours............<12.0 mg/dL  3-5 days..................<15.0 mg/dL  6-29 days.................<15.0 mg/dL       Alkaline Phosphatase   Date Value Ref Range Status   09/01/2020 54 (L) 55 - 135 U/L Final     AST   Date Value Ref Range Status   09/01/2020 17 10 - 40 U/L Final     ALT   Date Value Ref Range Status   09/01/2020 12 10 - 44 U/L Final     Anion Gap   Date Value Ref Range Status   09/01/2020 9 8 - 16 mmol/L Final     eGFR if    Date Value Ref Range Status   09/01/2020 39.6 (A) >60 mL/min/1.73 m^2 Final     eGFR if non    Date Value Ref Range Status   09/01/2020 34.3 (A) >60 mL/min/1.73 m^2 Final     Comment:     Calculation used to obtain the estimated glomerular filtration  rate (eGFR) is the CKD-EPI equation.         BMP  Lab Results   Component Value Date     09/01/2020    K 4.4 09/01/2020     09/01/2020    CO2 29 09/01/2020    BUN 37 (H) 09/01/2020    CREATININE 1.5 (H) 09/01/2020    CALCIUM 9.4 09/01/2020    ANIONGAP 9 09/01/2020    ESTGFRAFRICA 39.6 (A) 09/01/2020    EGFRNONAA 34.3 (A) 09/01/2020      BNP  @LABRCNTIP(BNP,BNPTRIAGEBLO)@   Lab Results   Component  Value Date    CHOL 131 09/01/2020    CHOL 159 09/17/2019    CHOL 164 07/30/2019     Lab Results   Component Value Date    HDL 44 09/01/2020    HDL 41 09/17/2019    HDL 40 07/30/2019     Lab Results   Component Value Date    LDLCALC 73.8 09/01/2020    LDLCALC 99.8 09/17/2019    LDLCALC 89.6 07/30/2019     Lab Results   Component Value Date    TRIG 66 09/01/2020    TRIG 91 09/17/2019    TRIG 172 (H) 07/30/2019     Lab Results   Component Value Date    CHOLHDL 33.6 09/01/2020    CHOLHDL 25.8 09/17/2019    CHOLHDL 24.4 07/30/2019      Lab Results   Component Value Date    TSH 0.250 (L) 09/01/2020    FREET4 0.88 09/01/2020     Lab Results   Component Value Date    HGBA1C 5.0 09/01/2020     Lab Results   Component Value Date    WBC 7.56 09/01/2020    HGB 11.0 (L) 09/01/2020    HCT 36.5 (L) 09/01/2020    MCV 94 09/01/2020     09/01/2020           Results for orders placed during the hospital encounter of 09/16/19   Echo Color Flow Doppler? Yes    Narrative · Concentric left ventricular hypertrophy.  · Normal left ventricular systolic function. The estimated ejection   fraction is 65%  · Normal LV diastolic function.  · Normal right ventricular systolic function.  · Calculated RVSP is 28mmhg.         No results found for this or any previous visit.       Assessment:       CHF (congestive heart failure)  Appears to be stable although she does have some leg edema    Coronary atherosclerosis  Stable.    Hypertension  Fairly well controlled.  She is to follow low-cholesterol diet as well as low-sodium    Hypercholesterolemia  Stable on current medical therapy       Plan:       Continue current medical therapy.  Follow low-sodium diet.  She is to walk frequently to avoid leg edema.  Will send her to the wound care nurse to evaluate her lower extremities.

## 2020-12-07 ENCOUNTER — OFFICE VISIT (OUTPATIENT)
Dept: FAMILY MEDICINE | Facility: CLINIC | Age: 75
End: 2020-12-07
Payer: MEDICARE

## 2020-12-07 ENCOUNTER — TELEPHONE (OUTPATIENT)
Dept: FAMILY MEDICINE | Facility: CLINIC | Age: 75
End: 2020-12-07

## 2020-12-07 VITALS
SYSTOLIC BLOOD PRESSURE: 136 MMHG | BODY MASS INDEX: 32.85 KG/M2 | WEIGHT: 174 LBS | HEIGHT: 61 IN | HEART RATE: 60 BPM | DIASTOLIC BLOOD PRESSURE: 64 MMHG

## 2020-12-07 DIAGNOSIS — I50.32 CHRONIC DIASTOLIC CONGESTIVE HEART FAILURE: Chronic | ICD-10-CM

## 2020-12-07 DIAGNOSIS — E78.00 PURE HYPERCHOLESTEROLEMIA: ICD-10-CM

## 2020-12-07 DIAGNOSIS — M16.0 PRIMARY OSTEOARTHRITIS OF BOTH HIPS: ICD-10-CM

## 2020-12-07 DIAGNOSIS — J96.11 CHRONIC HYPOXEMIC RESPIRATORY FAILURE: Primary | ICD-10-CM

## 2020-12-07 DIAGNOSIS — K21.9 GASTROESOPHAGEAL REFLUX DISEASE WITHOUT ESOPHAGITIS: ICD-10-CM

## 2020-12-07 DIAGNOSIS — J96.11 CHRONIC RESPIRATORY FAILURE WITH HYPOXIA: ICD-10-CM

## 2020-12-07 DIAGNOSIS — N18.32 STAGE 3B CHRONIC KIDNEY DISEASE: ICD-10-CM

## 2020-12-07 DIAGNOSIS — Z78.0 MENOPAUSE: ICD-10-CM

## 2020-12-07 DIAGNOSIS — I25.10 ATHEROSCLEROSIS OF NATIVE CORONARY ARTERY OF NATIVE HEART WITHOUT ANGINA PECTORIS: ICD-10-CM

## 2020-12-07 DIAGNOSIS — Z23 NEED FOR TDAP VACCINATION: ICD-10-CM

## 2020-12-07 DIAGNOSIS — J43.1 PANLOBULAR EMPHYSEMA: ICD-10-CM

## 2020-12-07 DIAGNOSIS — I10 ESSENTIAL HYPERTENSION: ICD-10-CM

## 2020-12-07 DIAGNOSIS — E11.8 DM TYPE 2, CONTROLLED, WITH COMPLICATION: ICD-10-CM

## 2020-12-07 DIAGNOSIS — I10 ESSENTIAL HYPERTENSION, BENIGN: ICD-10-CM

## 2020-12-07 DIAGNOSIS — F51.01 PRIMARY INSOMNIA: ICD-10-CM

## 2020-12-07 DIAGNOSIS — E11.42 DIABETIC PERIPHERAL NEUROPATHY: ICD-10-CM

## 2020-12-07 DIAGNOSIS — Z12.31 OTHER SCREENING MAMMOGRAM: ICD-10-CM

## 2020-12-07 DIAGNOSIS — R60.0 LOCALIZED EDEMA: ICD-10-CM

## 2020-12-07 LAB — HBA1C MFR BLD: 4.8 %

## 2020-12-07 PROCEDURE — 90471 IMMUNIZATION ADMIN: CPT | Mod: S$GLB,,, | Performed by: FAMILY MEDICINE

## 2020-12-07 PROCEDURE — 90471 TDAP VACCINE GREATER THAN OR EQUAL TO 7YO IM: ICD-10-PCS | Mod: S$GLB,,, | Performed by: FAMILY MEDICINE

## 2020-12-07 PROCEDURE — 99214 PR OFFICE/OUTPT VISIT, EST, LEVL IV, 30-39 MIN: ICD-10-PCS | Mod: 25,S$GLB,, | Performed by: FAMILY MEDICINE

## 2020-12-07 PROCEDURE — 99214 OFFICE O/P EST MOD 30 MIN: CPT | Mod: 25,S$GLB,, | Performed by: FAMILY MEDICINE

## 2020-12-07 PROCEDURE — 90715 TDAP VACCINE 7 YRS/> IM: CPT | Mod: S$GLB,,, | Performed by: FAMILY MEDICINE

## 2020-12-07 PROCEDURE — 83036 HEMOGLOBIN GLYCOSYLATED A1C: CPT | Mod: QW,,, | Performed by: FAMILY MEDICINE

## 2020-12-07 PROCEDURE — 90715 TDAP VACCINE GREATER THAN OR EQUAL TO 7YO IM: ICD-10-PCS | Mod: S$GLB,,, | Performed by: FAMILY MEDICINE

## 2020-12-07 PROCEDURE — 83036 POCT HEMOGLOBIN A1C: ICD-10-PCS | Mod: QW,,, | Performed by: FAMILY MEDICINE

## 2020-12-07 RX ORDER — ALBUTEROL SULFATE 90 UG/1
2 AEROSOL, METERED RESPIRATORY (INHALATION) EVERY 6 HOURS PRN
Qty: 36 G | Refills: 3 | Status: SHIPPED | OUTPATIENT
Start: 2020-12-07 | End: 2021-04-29 | Stop reason: SDUPTHER

## 2020-12-07 RX ORDER — LOVASTATIN 20 MG/1
20 TABLET ORAL NIGHTLY
Qty: 90 TABLET | Refills: 1 | Status: SHIPPED | OUTPATIENT
Start: 2020-12-07 | End: 2021-04-29 | Stop reason: SDUPTHER

## 2020-12-07 RX ORDER — TRAMADOL HYDROCHLORIDE 50 MG/1
50 TABLET ORAL 3 TIMES DAILY PRN
Qty: 90 TABLET | Refills: 1 | Status: SHIPPED | OUTPATIENT
Start: 2020-12-07 | End: 2020-12-28 | Stop reason: SDUPTHER

## 2020-12-07 RX ORDER — FUROSEMIDE 20 MG/1
20 TABLET ORAL 2 TIMES DAILY
Qty: 180 TABLET | Refills: 1 | Status: SHIPPED | OUTPATIENT
Start: 2020-12-07 | End: 2021-07-07 | Stop reason: SDUPTHER

## 2020-12-07 RX ORDER — ONDANSETRON 4 MG/1
8 TABLET, ORALLY DISINTEGRATING ORAL EVERY 6 HOURS PRN
Qty: 100 TABLET | Refills: 3 | Status: SHIPPED | OUTPATIENT
Start: 2020-12-07 | End: 2022-01-10 | Stop reason: SDUPTHER

## 2020-12-07 RX ORDER — METFORMIN HYDROCHLORIDE 500 MG/1
500 TABLET ORAL
Qty: 90 TABLET | Refills: 1 | Status: CANCELLED | OUTPATIENT
Start: 2020-12-07

## 2020-12-07 RX ORDER — BENAZEPRIL HYDROCHLORIDE 40 MG/1
40 TABLET ORAL DAILY
Qty: 90 TABLET | Refills: 1 | Status: SHIPPED | OUTPATIENT
Start: 2020-12-07 | End: 2021-04-29 | Stop reason: SDUPTHER

## 2020-12-07 RX ORDER — UMECLIDINIUM 62.5 UG/1
62.5 AEROSOL, POWDER ORAL EVERY MORNING
Qty: 30 EACH | Refills: 11 | Status: SHIPPED | OUTPATIENT
Start: 2020-12-07 | End: 2021-04-29 | Stop reason: SDUPTHER

## 2020-12-07 RX ORDER — PANTOPRAZOLE SODIUM 20 MG/1
20 TABLET, DELAYED RELEASE ORAL DAILY
Qty: 90 TABLET | Refills: 1 | Status: SHIPPED | OUTPATIENT
Start: 2020-12-07 | End: 2021-04-29 | Stop reason: SDUPTHER

## 2020-12-07 RX ORDER — MELOXICAM 7.5 MG/1
7.5 TABLET ORAL DAILY
Qty: 90 TABLET | Refills: 1 | Status: CANCELLED | OUTPATIENT
Start: 2020-12-07

## 2020-12-07 RX ORDER — FUROSEMIDE 20 MG/1
20 TABLET ORAL 2 TIMES DAILY
COMMUNITY
End: 2020-12-07 | Stop reason: SDUPTHER

## 2020-12-07 RX ORDER — IPRATROPIUM BROMIDE AND ALBUTEROL 20; 100 UG/1; UG/1
2 SPRAY, METERED RESPIRATORY (INHALATION) EVERY 4 HOURS PRN
Qty: 12 G | Refills: 3 | Status: SHIPPED | OUTPATIENT
Start: 2020-12-07 | End: 2021-04-29 | Stop reason: SDUPTHER

## 2020-12-07 NOTE — TELEPHONE ENCOUNTER
Spoke with pharmacist who had questions about the combivent because they usually dispense at [facility] but ours say every 4 hours I let her know per the directions are PRN Only when having SOB.

## 2020-12-07 NOTE — TELEPHONE ENCOUNTER
----- Message from Darnell Velazquez sent at 12/7/2020 12:43 PM CST -----  Regarding: Needs Rx clarification  Contact: Marisol Kerr  Pharmacy needs clarification on the Combivent . Please give Velasquez a call back [phone]

## 2020-12-07 NOTE — PROGRESS NOTES
SUBJECTIVE:    Patient ID: Marisol Kerr is a 75 y.o. female.    Chief Complaint: Follow-up (brought bottles marked taking only // Mammogram and Dexa due ac) and Immunizations (patient wants a TDAP vaccine for her new grandbaby, aware the insurance is not going to cover this and agrees to pay. No ABN printed upon signing order. ac)    This 75-year-old female is O2 dependent due to severe COPD.  She sees pulmonologist Dr. Correa who has on 2 L of O2 nasal cannula.  She has problems with stasis edema and leakage that are intermittent.  She does take Lasix 20 mg twice a day p.r.n. she takes Aleve p.r.n. arthritis pains has a unable to walk long distances.  She walks with 2 canes within her house only.  No recent falls.  Complains of joint pains takes arthritis strength Tylenol but states that this not effective.  She also takes meloxicam 7.5  mg daily.    She is expecting a new grandchild the family and is required to get a Tdap vaccine.    2019-colonoscopy with  -colitis found-RTC 5 years    Patient's blood sugars have been running very low on metformin 500 mg daily.  Blood sugars have been dipping in the 70s and 80s in the morning.      Office Visit on 12/07/2020   Component Date Value Ref Range Status    Hemoglobin A1C 12/07/2020 4.8  % Final   Lab Visit on 09/01/2020   Component Date Value Ref Range Status    Specimen UA 09/01/2020 Urine, Clean Catch   Final    Color, UA 09/01/2020 Yellow  Yellow, Straw, [place] Final    Appearance, UA 09/01/2020 Clear  Clear Final    pH, UA 09/01/2020 5.0  5.0 - 8.0 Final    Specific [place], UA 09/01/2020 1.010  1.005 - 1.030 Final    Protein, UA 09/01/2020 Negative  Negative Final    Glucose, UA 09/01/2020 Negative  Negative Final    Ketones, UA 09/01/2020 Negative  Negative Final    Bilirubin (UA) 09/01/2020 Negative  Negative Final    Occult Blood UA 09/01/2020 Negative  Negative Final    Nitrite, UA 09/01/2020 Negative  Negative Final    Urobilinogen,  UA 09/01/2020 Negative  Negative EU/dL Final    Leukocytes, UA 09/01/2020 Negative  Negative Final    Microalbumin, Urine 09/01/2020 6.3  ug/mL Final    Creatinine, Urine 09/01/2020 31.0  15.0 - 325.0 mg/dL Final    Microalb/Creat Ratio 09/01/2020 20.3  0.0 - 30.0 ug/mg Final   Lab Visit on 09/01/2020   Component Date Value Ref Range Status    WBC 09/01/2020 7.56  3.90 - 12.70 K/uL Final    RBC 09/01/2020 3.87* 4.00 - 5.40 M/uL Final    Hemoglobin 09/01/2020 11.0* 12.0 - 16.0 g/dL Final    Hematocrit 09/01/2020 36.5* 37.0 - 48.5 % Final    MCV 09/01/2020 94  82 - 98 fL Final    MCH 09/01/2020 28.4  27.0 - 31.0 pg Final    MCHC 09/01/2020 30.1* 32.0 - 36.0 g/dL Final    RDW 09/01/2020 12.6  11.5 - 14.5 % Final    Platelets 09/01/2020 160  150 - 350 K/uL Final    MPV 09/01/2020 12.0  9.2 - 12.9 fL Final    Immature Granulocytes 09/01/2020 0.4  0.0 - 0.5 % Final    Gran # (ANC) 09/01/2020 6.0  1.8 - 7.7 K/uL Final    Immature Grans (Abs) 09/01/2020 0.03  0.00 - 0.04 K/uL Final    Lymph # 09/01/2020 1.0  1.0 - 4.8 K/uL Final    Mono # 09/01/2020 0.5  0.3 - 1.0 K/uL Final    Eos # 09/01/2020 0.1  0.0 - 0.5 K/uL Final    Baso # 09/01/2020 0.03  0.00 - 0.20 K/uL Final    nRBC 09/01/2020 0  0 /100 WBC Final    Gran % 09/01/2020 79.2* 38.0 - 73.0 % Final    Lymph % 09/01/2020 12.7* 18.0 - 48.0 % Final    Mono % 09/01/2020 6.5  4.0 - 15.0 % Final    Eosinophil % 09/01/2020 0.8  0.0 - 8.0 % Final    Basophil % 09/01/2020 0.4  0.0 - 1.9 % Final    Differential Method 09/01/2020 Automated   Final    Sodium 09/01/2020 141  136 - 145 mmol/L Final    Potassium 09/01/2020 4.4  3.5 - 5.1 mmol/L Final    Chloride 09/01/2020 103  95 - 110 mmol/L Final    CO2 09/01/2020 29  23 - 29 mmol/L Final    Glucose 09/01/2020 86  70 - 110 mg/dL Final    BUN 09/01/2020 37* 8 - 23 mg/dL Final    Creatinine 09/01/2020 1.5* 0.5 - 1.4 mg/dL Final    Calcium 09/01/2020 9.4  8.7 - 10.5 mg/dL Final    Total Protein  09/01/2020 6.2  6.0 - 8.4 g/dL Final    Albumin 09/01/2020 3.7  3.5 - 5.2 g/dL Final    Total Bilirubin 09/01/2020 1.1* 0.1 - 1.0 mg/dL Final    Alkaline Phosphatase 09/01/2020 54* 55 - 135 U/L Final    AST 09/01/2020 17  10 - 40 U/L Final    ALT 09/01/2020 12  10 - 44 U/L Final    Anion Gap 09/01/2020 9  8 - 16 mmol/L Final    eGFR if  09/01/2020 39.6* >60 mL/min/1.73 m^2 Final    eGFR if non African American 09/01/2020 34.3* >60 mL/min/1.73 m^2 Final    Cholesterol 09/01/2020 131  120 - 199 mg/dL Final    Triglycerides 09/01/2020 66  30 - 150 mg/dL Final    HDL 09/01/2020 44  40 - 75 mg/dL Final    LDL Cholesterol 09/01/2020 73.8  63.0 - 159.0 mg/dL Final    HDL/Cholesterol Ratio 09/01/2020 33.6  20.0 - 50.0 % Final    Total Cholesterol/HDL Ratio 09/01/2020 3.0  2.0 - 5.0 Final    Non-HDL Cholesterol 09/01/2020 87  mg/dL Final    Hemoglobin A1C 09/01/2020 5.0  4.5 - 6.2 % Final    Estimated Avg Glucose 09/01/2020 97  68 - 131 mg/dL Final    TSH 09/01/2020 0.250* 0.340 - 5.600 uIU/mL Final    Free T4 09/01/2020 0.88  0.71 - 1.51 ng/dL Final       Past Medical History:   Diagnosis Date    CAD (coronary artery disease)     Cancer     colon    CHF (congestive heart failure)     Colitis     Colon cancer     COPD (chronic obstructive pulmonary disease)     Decreased hearing     Diabetes mellitus     Diabetes mellitus, type 2     Hypercholesterolemia     Hypertension     Hypoxemia     Insomnia     Obesity     Osteoarthritis      Social History     Socioeconomic History    Marital status:      Spouse name: Not on file    Number of children: Not on file    Years of education: Not on file    Highest education level: Not on file   Occupational History    Not on file   Social Needs    Financial resource strain: Not on file    Food insecurity     Worry: Not on file     Inability: Not on file    Transportation needs     Medical: Not on file     Non-medical: Not  on file   Tobacco Use    Smoking status: Former Smoker     Packs/day: 3.00     Years: 50.00     Pack years: 150.00     Types: Cigarettes     Start date: 3/30/1964     Quit date: 2006     Years since quittin.7    Smokeless tobacco: Never Used   Substance and Sexual Activity    Alcohol use: Yes    Drug use: No    Sexual activity: Never   Lifestyle    Physical activity     Days per week: Not on file     Minutes per session: Not on file    Stress: Not on file   Relationships    Social connections     Talks on phone: Not on file     Gets together: Not on file     Attends Rastafari service: Not on file     Active member of club or organization: Not on file     Attends meetings of clubs or organizations: Not on file     Relationship status: Not on file   Other Topics Concern    Not on file   Social History Narrative    Not on file     Past Surgical History:   Procedure Laterality Date    CARDIAC CATHETERIZATION      CHOLECYSTECTOMY      COLECTOMY      CORONARY ANGIOPLASTY      CORONARY STENT PLACEMENT      EXTERNAL EAR SURGERY      EYE SURGERY      FLEXIBLE SIGMOIDOSCOPY N/A 2019    Procedure: SIGMOIDOSCOPY, FLEXIBLE;  Surgeon: Biju Kramer III, MD;  Location: [facility];  Service: Endoscopy;  Laterality: N/A;    HYSTERECTOMY      partial     Family History   Problem Relation Age of Onset    Febrile seizures Mother     Heart failure Father        Review of patient's allergies indicates:   Allergen Reactions    Iodinated contrast media     Strawberries [strawberry] Hives and Itching       Current Outpatient Medications:     albuterol (VENTOLIN HFA) 90 mcg/actuation inhaler, Inhale 2 puffs into the lungs every 6 (six) hours as needed for Wheezing or Shortness of Breath. Rescue, Disp: 36 g, Rfl: 3    benazepriL (LOTENSIN) 40 MG tablet, Take 1 tablet (40 mg total) by mouth once daily., Disp: 90 tablet, Rfl: 1    ergocalciferol (VITAMIN D2) 50,000 unit Cap, Take 1 capsule (50,000  Units total) by mouth every 7 days., Disp: 12 capsule, Rfl: 1    furosemide (LASIX) 20 MG tablet, Take 1 tablet (20 mg total) by mouth 2 (two) times daily., Disp: 180 tablet, Rfl: 1    ipratropium-albuteroL (COMBIVENT RESPIMAT)  mcg/actuation inhaler, Inhale 2 puffs into the lungs every 4 (four) hours as needed for Shortness of Breath. Rescue, Disp: 12 g, Rfl: 3    lovastatin (MEVACOR) 20 MG tablet, Take 1 tablet (20 mg total) by mouth every evening., Disp: 90 tablet, Rfl: 1    meloxicam (MOBIC) 7.5 MG tablet, Take 1 tablet (7.5 mg total) by mouth once daily., Disp: 90 tablet, Rfl: 1    metFORMIN (GLUCOPHAGE) 500 MG tablet, Take 1 tablet (500 mg total) by mouth daily with breakfast., Disp: 90 tablet, Rfl: 1    metoprolol succinate (TOPROL-XL) 50 MG 24 hr tablet, Take 1 tablet by mouth once daily, Disp: 90 tablet, Rfl: 0    nitroGLYCERIN 0.2 mg/hr TD PT24 (NITRODUR) 0.2 mg/hr, Place 1 patch onto the skin every morning., Disp: , Rfl:     ondansetron (ZOFRAN-ODT) 4 MG TbDL, Take 2 tablets (8 mg total) by mouth every 6 (six) hours as needed., Disp: 100 tablet, Rfl: 3    pantoprazole (PROTONIX) 20 MG tablet, Take 1 tablet (20 mg total) by mouth once daily., Disp: 90 tablet, Rfl: 1    umeclidinium (INCRUSE ELLIPTA) 62.5 mcg/actuation inhalation capsule, Inhale 1 capsule (63 mcg total) into the lungs every morning. Controller, Disp: 30 each, Rfl: 11    acetaminophen (TYLENOL) 500 MG tablet, Take 500 mg by mouth every 6 (six) hours as needed for Pain. , Disp: , Rfl:     albuterol (ACCUNEB) 1.25 mg/3 mL Nebu, Take 3 mLs (1.25 mg total) by nebulization every 6 (six) hours as needed. Rescue, Disp: 1 Box, Rfl: 8    aspirin (ECOTRIN) 81 MG EC tablet, Take 81 mg by mouth every morning. , Disp: , Rfl:     bromfenac (PROLENSA) 0.07 % Drop, Place 1 drop into both eyes daily as needed., Disp: , Rfl:     coenzyme Q10 100 mg capsule, Take 100 mg by mouth every morning., Disp: , Rfl:     dextran 70-hypromellose  (ARTIFICIAL TEARS,IIMV22-RDDCW,) 0.1-0.3 % Drop, Place 1 drop into both eyes daily as needed., Disp: , Rfl:     fish oil-omega-3 fatty acids 300-1,000 mg capsule, Take 1 capsule by mouth every morning., Disp: , Rfl:     FLAXSEED OIL ORAL, Take 1,200 mg by mouth every morning., Disp: , Rfl:     fluticasone propionate (FLONASE) 50 mcg/actuation nasal spray, 2 sprays (100 mcg total) by Each Nostril route daily as needed for Allergies., Disp: 16 g, Rfl: 3    fluticasone-salmeterol diskus inhaler 250-50 mcg, Inhale 1 puff into the lungs 2 (two) times daily., Disp: 3 each, Rfl: 1    nitroGLYCERIN 0.4 MG/DOSE TL SPRY (NITROLINGUAL) 400 mcg/spray spray, Place 1 spray under the tongue every 5 (five) minutes as needed for Chest pain (DO NOT EXCEED A TOTAL OF 3 SPRAYS IN 15 MINUTES)., Disp: 4.9 g, Rfl: 0    PNV NO.115/IRON FUMARATE/FA (PRENATAL #115-IRON-FOLIC ACID ORAL), Take 1 tablet by mouth every morning. , Disp: , Rfl:     spironolactone (ALDACTONE) 25 MG tablet, Take 1 tablet (25 mg total) by mouth once daily., Disp: 90 tablet, Rfl: 1    temazepam (RESTORIL) 15 mg Cap, Take 1 capsule (15 mg total) by mouth every evening., Disp: 90 capsule, Rfl: 1    traMADoL (ULTRAM) 50 mg tablet, Take 1 tablet (50 mg total) by mouth 3 (three) times daily as needed for Pain., Disp: 90 tablet, Rfl: 1    vit C/E/Zn/coppr/lutein/zeaxan (PRESERVISION AREDS-2 ORAL), Take 1 capsule by mouth every morning., Disp: , Rfl:     Current Facility-Administered Medications:     furosemide tablet 40 mg, 40 mg, Oral, 1 time in Clinic/HOD, Cristian Benjamin MD    Review of Systems   Constitutional: Negative for appetite change, chills, fatigue, fever and unexpected weight change.   HENT: Negative for congestion, ear pain, sinus pain, sore throat and trouble swallowing.    Eyes: Negative for pain, discharge and visual disturbance.   Respiratory: Positive for shortness of breath (O2  dependant  2 -3/l min). Negative for apnea, cough and  "wheezing.    Cardiovascular: Negative for chest pain, palpitations and leg swelling.   Gastrointestinal: Negative for abdominal pain, blood in stool, constipation, diarrhea, nausea and vomiting.   Endocrine: Negative for heat intolerance, polydipsia and polyuria.   Genitourinary: Negative for difficulty urinating, dyspareunia, dysuria, frequency, hematuria and menstrual problem.   Musculoskeletal: Positive for arthralgias (hips and legs  hurt daily , uses  a lift  chair ,walks w/ 2  canes) and gait problem. Negative for back pain, joint swelling and myalgias.   Allergic/Immunologic: Negative for environmental allergies, food allergies and immunocompromised state.   Neurological: Negative for dizziness, tremors, seizures, numbness and headaches.   Psychiatric/Behavioral: Negative for behavioral problems, confusion, hallucinations and suicidal ideas. The patient is not nervous/anxious.           Objective:      Vitals:    12/07/20 1140   BP: 136/64   Pulse: 60   Weight: 78.9 kg (174 lb)   Height: 5' 1" (1.549 m)     Physical Exam  Vitals signs and nursing note reviewed.   Constitutional:       General: She is not in acute distress.     Appearance: She is well-developed. She is obese.      Comments: Elderly female sitting in a transport chair with oxygen.   HENT:      Head: Normocephalic and atraumatic.      Right Ear: External ear normal.      Left Ear: External ear normal.      Nose: Nose normal.   Eyes:      Pupils: Pupils are equal, round, and reactive to light.   Neck:      Musculoskeletal: Normal range of motion and neck supple.      Thyroid: No thyromegaly.      Vascular: No carotid bruit.   Cardiovascular:      Rate and Rhythm: Normal rate and regular rhythm.      Heart sounds: Normal heart sounds. No murmur.   Pulmonary:      Effort: Pulmonary effort is normal.      Breath sounds: Normal breath sounds. No wheezing or rales.   Abdominal:      General: Bowel sounds are normal. There is no distension.      " Palpations: Abdomen is soft.      Tenderness: There is no abdominal tenderness.   Musculoskeletal: Normal range of motion.         General: No tenderness or deformity.      Lumbar back: Normal. She exhibits no pain and no spasm.      Right lower leg: Edema (2+ pitting edema pretibially) present.      Left lower leg: Edema ( 2+ pitting edema pretibially) present.      Comments: Bends 90 degrees at  waist knees are swollen and crepitant, shoulders have good range of motion   Lymphadenopathy:      Cervical: No cervical adenopathy.   Skin:     General: Skin is warm and dry.      Findings: No rash.   Neurological:      Mental Status: She is alert and oriented to person, place, and time.      Cranial Nerves: No cranial nerve deficit.      Coordination: Coordination normal.      Gait: Gait abnormal ( wobbly abnormal gait).   Psychiatric:         Behavior: Behavior normal.         Thought Content: Thought content normal.         Judgment: Judgment normal.           Assessment:       1. Chronic hypoxemic respiratory failure    2. Menopause    3. Other screening mammogram    4. DM type 2, controlled, with complication    5. Gastroesophageal reflux disease without esophagitis    6. Diabetic peripheral neuropathy    7. Pure hypercholesterolemia    8. Essential hypertension, benign    9. Panlobular emphysema    10. Primary osteoarthritis of both hips    11. Need for Tdap vaccination    12. Chronic respiratory failure with hypoxia    13. Essential hypertension    14. Atherosclerosis of native coronary artery of native heart without angina pectoris    15. Chronic diastolic congestive heart failure    16. Stage 3b chronic kidney disease    17. Primary insomnia    18. Localized edema         Plan:       Chronic hypoxemic respiratory failure  -     umeclidinium (INCRUSE ELLIPTA) 62.5 mcg/actuation inhalation capsule; Inhale 1 capsule (63 mcg total) into the lungs every morning. Controller  Dispense: 30 each; Refill: 11  -      ipratropium-albuteroL (COMBIVENT RESPIMAT)  mcg/actuation inhaler; Inhale 2 puffs into the lungs every 4 (four) hours as needed for Shortness of Breath. Rescue  Dispense: 12 g; Refill: 3  Continue O2 at 2 L nasal cannula  Menopause  -     DXA Bone Density Spine And Hip; Future; Expected date: 12/07/2020  DEXA scan ordered  Other screening mammogram  -     Mammo Digital Screening Bilat w/ Nathan; Future; Expected date: 12/07/2020  Mammogram ordered  DM type 2, controlled, with complication  -     Hemoglobin A1C, POCT  -     CBC Auto Differential; Future; Expected date: 12/07/2020  -     Comprehensive Metabolic Panel; Future; Expected date: 12/07/2020  -     Lipid Panel; Future; Expected date: 12/07/2020  -     Hemoglobin A1C; Future; Expected date: 12/07/2020  A1c is down to 4.8.  I advised patient to stop taking metformin and continue to monitor blood sugars  Gastroesophageal reflux disease without esophagitis  -     pantoprazole (PROTONIX) 20 MG tablet; Take 1 tablet (20 mg total) by mouth once daily.  Dispense: 90 tablet; Refill: 1  -     ondansetron (ZOFRAN-ODT) 4 MG TbDL; Take 2 tablets (8 mg total) by mouth every 6 (six) hours as needed.  Dispense: 100 tablet; Refill: 3  Refill pantoprazole  Diabetic peripheral neuropathy    Pure hypercholesterolemia  -     lovastatin (MEVACOR) 20 MG tablet; Take 1 tablet (20 mg total) by mouth every evening.  Dispense: 90 tablet; Refill: 1  Lipid panel due next visit  Essential hypertension, benign  -     benazepriL (LOTENSIN) 40 MG tablet; Take 1 tablet (40 mg total) by mouth once daily.  Dispense: 90 tablet; Refill: 1  -     furosemide (LASIX) 20 MG tablet; Take 1 tablet (20 mg total) by mouth 2 (two) times daily.  Dispense: 180 tablet; Refill: 1  Okay to use Lasix 20 mg b.i.d. for edema  Panlobular emphysema  -     albuterol (VENTOLIN HFA) 90 mcg/actuation inhaler; Inhale 2 puffs into the lungs every 6 (six) hours as needed for Wheezing or Shortness of Breath. Rescue   Dispense: 36 g; Refill: 3    Primary osteoarthritis of both hips  -     traMADoL (ULTRAM) 50 mg tablet; Take 1 tablet (50 mg total) by mouth 3 (three) times daily as needed for Pain.  Dispense: 90 tablet; Refill: 1  Discontinue meloxicam due to chronic kidney disease, add tramadol 50 mg t.i.d. p.r.n.  Need for Tdap vaccination  -     Tdap Vaccine  Tdap today  Chronic respiratory failure with hypoxia    Essential hypertension    Atherosclerosis of native coronary artery of native heart without angina pectoris    Chronic diastolic congestive heart failure    Stage 3b chronic kidney disease  GFR has dropped down from 45 down to 34, while she was on Aleve and meloxicam, these have been discontinued, recheck CMP 3 months  Primary insomnia    Localized edema  Lasix 20 mg b.i.d.    Follow up in about 3 months (around 3/7/2021), or [id number] 3.        12/7/2020 Khadar Dwyer

## 2020-12-09 ENCOUNTER — LAB VISIT (OUTPATIENT)
Dept: LAB | Facility: HOSPITAL | Age: 75
End: 2020-12-09
Attending: INTERNAL MEDICINE
Payer: MEDICARE

## 2020-12-09 DIAGNOSIS — R19.7 DIARRHEA OF PRESUMED INFECTIOUS ORIGIN: Primary | ICD-10-CM

## 2020-12-09 PROCEDURE — 87328 CRYPTOSPORIDIUM AG IA: CPT

## 2020-12-09 PROCEDURE — 83993 ASSAY FOR CALPROTECTIN FECAL: CPT

## 2020-12-09 PROCEDURE — 87329 GIARDIA AG IA: CPT

## 2020-12-09 PROCEDURE — 87046 STOOL CULTR AEROBIC BACT EA: CPT | Mod: 59

## 2020-12-09 PROCEDURE — 82656 EL-1 FECAL QUAL/SEMIQ: CPT

## 2020-12-09 PROCEDURE — 87045 FECES CULTURE AEROBIC BACT: CPT

## 2020-12-11 ENCOUNTER — TELEPHONE (OUTPATIENT)
Dept: FAMILY MEDICINE | Facility: CLINIC | Age: 75
End: 2020-12-11

## 2020-12-11 DIAGNOSIS — Z13.820 SPECIAL SCREENING FOR OSTEOPOROSIS: ICD-10-CM

## 2020-12-11 DIAGNOSIS — Z78.0 MENOPAUSE: Primary | ICD-10-CM

## 2020-12-11 LAB
CALPROTECTIN STL-MCNT: 106 UG/G (ref 0–120)
CRYPTOSP AG STL QL IA: NEGATIVE
GIARDIA ANTIGEN - EIA: NEGATIVE
GIARDIA LAMBLIA AG SOURCE: 0
STOOL CULTURE: NORMAL
STOOL CULTURE: NORMAL

## 2020-12-11 NOTE — TELEPHONE ENCOUNTER
----- Message from Shayna Mcgrath sent at 12/11/2020  3:04 PM CST -----  Regarding: Additonal Dx code needed for Dexa scan Z13.820  Dr. Dwyer:      Please add DX code Z13.820 to current DEXA Order. Medicare will not pay unless Z13.820 is accompanied with dx code Z78.0.    Thanks!  Shayna Mcgrath

## 2020-12-15 LAB — ELASTASE PANC STL-MCNT: 306 UG ELAST./G

## 2020-12-28 DIAGNOSIS — M16.0 PRIMARY OSTEOARTHRITIS OF BOTH HIPS: ICD-10-CM

## 2020-12-28 DIAGNOSIS — I10 ESSENTIAL HYPERTENSION, BENIGN: ICD-10-CM

## 2020-12-28 DIAGNOSIS — E55.9 VITAMIN D DEFICIENCY: ICD-10-CM

## 2020-12-28 RX ORDER — ERGOCALCIFEROL 1.25 MG/1
50000 CAPSULE ORAL
Qty: 12 CAPSULE | Refills: 1 | Status: SHIPPED | OUTPATIENT
Start: 2020-12-28 | End: 2021-04-29 | Stop reason: SDUPTHER

## 2020-12-28 RX ORDER — TRAMADOL HYDROCHLORIDE 50 MG/1
50 TABLET ORAL 3 TIMES DAILY PRN
Qty: 90 TABLET | Refills: 0 | Status: CANCELLED | OUTPATIENT
Start: 2021-01-06

## 2020-12-28 RX ORDER — TRAMADOL HYDROCHLORIDE 50 MG/1
50 TABLET ORAL 3 TIMES DAILY PRN
Qty: 90 TABLET | Refills: 1 | Status: SHIPPED | OUTPATIENT
Start: 2021-01-07 | End: 2021-04-29

## 2020-12-28 RX ORDER — METOPROLOL SUCCINATE 50 MG/1
50 TABLET, EXTENDED RELEASE ORAL DAILY
Qty: 90 TABLET | Refills: 1 | Status: SHIPPED | OUTPATIENT
Start: 2020-12-28 | End: 2021-04-29 | Stop reason: SDUPTHER

## 2020-12-28 NOTE — TELEPHONE ENCOUNTER
prescription sent to   [facility] - [place] [facility] [address] [facility]  [address]  [place] [address]  Phone: [phone] Fax: [phone]

## 2020-12-28 NOTE — TELEPHONE ENCOUNTER
----- Message from Aniyah Murray sent at 12/28/2020 11:09 AM CST -----  Regarding: refills  Vitamin   Metoprolol   [facility] pain med   Pharm [facility] [place]   Pt [phone]

## 2021-01-07 ENCOUNTER — HOSPITAL ENCOUNTER (OUTPATIENT)
Dept: RADIOLOGY | Facility: HOSPITAL | Age: 76
Discharge: HOME | End: 2021-01-07
Attending: FAMILY MEDICINE
Payer: MEDICARE

## 2021-01-07 VITALS — BODY MASS INDEX: 32.84 KG/M2 | WEIGHT: 173.94 LBS | HEIGHT: 61 IN

## 2021-01-07 DIAGNOSIS — Z12.31 OTHER SCREENING MAMMOGRAM: ICD-10-CM

## 2021-01-07 PROCEDURE — 77067 SCR MAMMO BI INCL CAD: CPT | Mod: TC,PO

## 2021-01-11 ENCOUNTER — TELEPHONE (OUTPATIENT)
Dept: FAMILY MEDICINE | Facility: CLINIC | Age: 76
End: 2021-01-11

## 2021-01-11 ENCOUNTER — TELEPHONE (OUTPATIENT)
Dept: CARDIOLOGY | Facility: CLINIC | Age: 76
End: 2021-01-11
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
## 2021-02-01 DIAGNOSIS — F51.01 PRIMARY INSOMNIA: ICD-10-CM

## 2021-02-01 RX ORDER — TEMAZEPAM 15 MG/1
15 CAPSULE ORAL NIGHTLY
Qty: 90 CAPSULE | Refills: 1 | Status: SHIPPED | OUTPATIENT
Start: 2021-02-01 | End: 2022-01-27 | Stop reason: SDUPTHER

## 2021-02-01 RX ORDER — FLUTICASONE PROPIONATE AND SALMETEROL 250; 50 UG/1; UG/1
1 POWDER RESPIRATORY (INHALATION) 2 TIMES DAILY
Qty: 3 EACH | Refills: 1 | Status: SHIPPED | OUTPATIENT
Start: 2021-02-01 | End: 2021-04-29 | Stop reason: SDUPTHER

## 2021-02-24 ENCOUNTER — TELEPHONE (OUTPATIENT)
Dept: FAMILY MEDICINE | Facility: CLINIC | Age: 76
End: 2021-02-24

## 2021-03-03 ENCOUNTER — TELEPHONE (OUTPATIENT)
Dept: FAMILY MEDICINE | Facility: CLINIC | Age: 76
End: 2021-03-03

## 2021-03-18 ENCOUNTER — TELEPHONE (OUTPATIENT)
Dept: FAMILY MEDICINE | Facility: CLINIC | Age: 76
End: 2021-03-18

## 2021-04-01 ENCOUNTER — OFFICE VISIT (OUTPATIENT)
Dept: CARDIOLOGY | Facility: CLINIC | Age: 76
End: 2021-04-01
Payer: MEDICARE

## 2021-04-01 VITALS
HEART RATE: 51 BPM | SYSTOLIC BLOOD PRESSURE: 122 MMHG | DIASTOLIC BLOOD PRESSURE: 70 MMHG | WEIGHT: 165 LBS | OXYGEN SATURATION: 99 % | BODY MASS INDEX: 31.18 KG/M2

## 2021-04-01 DIAGNOSIS — I10 ESSENTIAL HYPERTENSION, BENIGN: ICD-10-CM

## 2021-04-01 DIAGNOSIS — E78.00 HYPERCHOLESTEROLEMIA: ICD-10-CM

## 2021-04-01 DIAGNOSIS — I50.32 CHRONIC DIASTOLIC CONGESTIVE HEART FAILURE: Chronic | ICD-10-CM

## 2021-04-01 DIAGNOSIS — I25.10 ATHEROSCLEROSIS OF NATIVE CORONARY ARTERY OF NATIVE HEART WITHOUT ANGINA PECTORIS: ICD-10-CM

## 2021-04-01 PROCEDURE — 99214 OFFICE O/P EST MOD 30 MIN: CPT | Mod: S$GLB,,, | Performed by: INTERNAL MEDICINE

## 2021-04-01 PROCEDURE — 99214 PR OFFICE/OUTPT VISIT, EST, LEVL IV, 30-39 MIN: ICD-10-PCS | Mod: S$GLB,,, | Performed by: INTERNAL MEDICINE

## 2021-04-12 ENCOUNTER — OFFICE VISIT (OUTPATIENT)
Dept: PULMONOLOGY | Facility: CLINIC | Age: 76
End: 2021-04-12
Payer: MEDICARE

## 2021-04-12 VITALS
DIASTOLIC BLOOD PRESSURE: 60 MMHG | HEART RATE: 55 BPM | BODY MASS INDEX: 34.17 KG/M2 | SYSTOLIC BLOOD PRESSURE: 125 MMHG | HEIGHT: 61 IN | OXYGEN SATURATION: 98 % | WEIGHT: 181 LBS

## 2021-04-12 DIAGNOSIS — J44.9 CHRONIC OBSTRUCTIVE PULMONARY DISEASE, UNSPECIFIED COPD TYPE: Primary | ICD-10-CM

## 2021-04-12 DIAGNOSIS — J43.9 PULMONARY EMPHYSEMA, UNSPECIFIED EMPHYSEMA TYPE: ICD-10-CM

## 2021-04-12 DIAGNOSIS — J96.11 CHRONIC HYPOXEMIC RESPIRATORY FAILURE: ICD-10-CM

## 2021-04-12 DIAGNOSIS — R91.8 PULMONARY NODULES: ICD-10-CM

## 2021-04-12 PROCEDURE — 99214 PR OFFICE/OUTPT VISIT, EST, LEVL IV, 30-39 MIN: ICD-10-PCS | Mod: S$GLB,,, | Performed by: NURSE PRACTITIONER

## 2021-04-12 PROCEDURE — 99214 OFFICE O/P EST MOD 30 MIN: CPT | Mod: S$GLB,,, | Performed by: NURSE PRACTITIONER

## 2021-04-29 ENCOUNTER — TELEPHONE (OUTPATIENT)
Dept: FAMILY MEDICINE | Facility: CLINIC | Age: 76
End: 2021-04-29

## 2021-04-29 ENCOUNTER — OFFICE VISIT (OUTPATIENT)
Dept: FAMILY MEDICINE | Facility: CLINIC | Age: 76
End: 2021-04-29
Payer: MEDICARE

## 2021-04-29 VITALS
SYSTOLIC BLOOD PRESSURE: 134 MMHG | HEART RATE: 60 BPM | HEIGHT: 61 IN | WEIGHT: 182 LBS | BODY MASS INDEX: 34.36 KG/M2 | DIASTOLIC BLOOD PRESSURE: 56 MMHG | OXYGEN SATURATION: 93 %

## 2021-04-29 DIAGNOSIS — M15.9 PRIMARY OSTEOARTHRITIS INVOLVING MULTIPLE JOINTS: Primary | ICD-10-CM

## 2021-04-29 DIAGNOSIS — J96.11 CHRONIC HYPOXEMIC RESPIRATORY FAILURE: ICD-10-CM

## 2021-04-29 DIAGNOSIS — M15.9 PRIMARY OSTEOARTHRITIS INVOLVING MULTIPLE JOINTS: ICD-10-CM

## 2021-04-29 DIAGNOSIS — I10 ESSENTIAL HYPERTENSION, BENIGN: ICD-10-CM

## 2021-04-29 DIAGNOSIS — E55.9 VITAMIN D DEFICIENCY: ICD-10-CM

## 2021-04-29 DIAGNOSIS — K21.9 GASTROESOPHAGEAL REFLUX DISEASE WITHOUT ESOPHAGITIS: ICD-10-CM

## 2021-04-29 DIAGNOSIS — N18.32 STAGE 3B CHRONIC KIDNEY DISEASE: ICD-10-CM

## 2021-04-29 DIAGNOSIS — E11.8 DM TYPE 2, CONTROLLED, WITH COMPLICATION: Primary | ICD-10-CM

## 2021-04-29 DIAGNOSIS — J43.1 PANLOBULAR EMPHYSEMA: ICD-10-CM

## 2021-04-29 DIAGNOSIS — E78.00 PURE HYPERCHOLESTEROLEMIA: ICD-10-CM

## 2021-04-29 LAB — HBA1C MFR BLD: 5.1 %

## 2021-04-29 PROCEDURE — 99214 OFFICE O/P EST MOD 30 MIN: CPT | Mod: S$GLB,,, | Performed by: NURSE PRACTITIONER

## 2021-04-29 PROCEDURE — 83036 POCT HEMOGLOBIN A1C: ICD-10-PCS | Mod: QW,,, | Performed by: NURSE PRACTITIONER

## 2021-04-29 PROCEDURE — 99214 PR OFFICE/OUTPT VISIT, EST, LEVL IV, 30-39 MIN: ICD-10-PCS | Mod: S$GLB,,, | Performed by: NURSE PRACTITIONER

## 2021-04-29 PROCEDURE — 83036 HEMOGLOBIN GLYCOSYLATED A1C: CPT | Mod: QW,,, | Performed by: NURSE PRACTITIONER

## 2021-04-29 RX ORDER — UMECLIDINIUM 62.5 UG/1
62.5 AEROSOL, POWDER ORAL EVERY MORNING
Qty: 30 EACH | Refills: 11 | Status: ON HOLD | OUTPATIENT
Start: 2021-04-29 | End: 2022-03-18 | Stop reason: HOSPADM

## 2021-04-29 RX ORDER — ERGOCALCIFEROL 1.25 MG/1
50000 CAPSULE ORAL
Qty: 12 CAPSULE | Refills: 1 | Status: SHIPPED | OUTPATIENT
Start: 2021-04-29 | End: 2022-01-10 | Stop reason: SDUPTHER

## 2021-04-29 RX ORDER — MELOXICAM 7.5 MG/1
7.5 TABLET ORAL DAILY
Qty: 90 TABLET | Refills: 1 | Status: SHIPPED | OUTPATIENT
Start: 2021-04-29 | End: 2022-01-10 | Stop reason: SDUPTHER

## 2021-04-29 RX ORDER — LOVASTATIN 20 MG/1
20 TABLET ORAL NIGHTLY
Qty: 90 TABLET | Refills: 1 | Status: SHIPPED | OUTPATIENT
Start: 2021-04-29 | End: 2021-10-07

## 2021-04-29 RX ORDER — BENAZEPRIL HYDROCHLORIDE 40 MG/1
40 TABLET ORAL DAILY
Qty: 90 TABLET | Refills: 1 | Status: SHIPPED | OUTPATIENT
Start: 2021-04-29 | End: 2022-01-06 | Stop reason: SDUPTHER

## 2021-04-29 RX ORDER — METOPROLOL SUCCINATE 50 MG/1
50 TABLET, EXTENDED RELEASE ORAL DAILY
Qty: 90 TABLET | Refills: 1 | Status: SHIPPED | OUTPATIENT
Start: 2021-04-29 | End: 2021-10-07

## 2021-04-29 RX ORDER — PANTOPRAZOLE SODIUM 20 MG/1
20 TABLET, DELAYED RELEASE ORAL DAILY
Qty: 90 TABLET | Refills: 1 | Status: SHIPPED | OUTPATIENT
Start: 2021-04-29 | End: 2021-07-07 | Stop reason: SDUPTHER

## 2021-04-29 RX ORDER — ALBUTEROL SULFATE 90 UG/1
2 AEROSOL, METERED RESPIRATORY (INHALATION) EVERY 6 HOURS PRN
Qty: 36 G | Refills: 3 | Status: SHIPPED | OUTPATIENT
Start: 2021-04-29 | End: 2022-01-27 | Stop reason: SDUPTHER

## 2021-04-29 RX ORDER — FLUTICASONE PROPIONATE AND SALMETEROL 250; 50 UG/1; UG/1
1 POWDER RESPIRATORY (INHALATION) 2 TIMES DAILY
Qty: 3 EACH | Refills: 1 | Status: ON HOLD | OUTPATIENT
Start: 2021-04-29 | End: 2022-03-18 | Stop reason: HOSPADM

## 2021-04-29 RX ORDER — MELOXICAM 7.5 MG/1
7.5 TABLET ORAL DAILY
COMMUNITY
End: 2021-04-29 | Stop reason: SDUPTHER

## 2021-04-29 RX ORDER — HYDROCODONE BITARTRATE AND ACETAMINOPHEN 5; 325 MG/1; MG/1
1 TABLET ORAL EVERY 12 HOURS PRN
Qty: 14 TABLET | Refills: 0 | Status: SHIPPED | OUTPATIENT
Start: 2021-04-29 | End: 2021-05-14 | Stop reason: SDUPTHER

## 2021-04-29 RX ORDER — NALOXONE HYDROCHLORIDE 4 MG/.1ML
SPRAY NASAL
COMMUNITY
Start: 2021-02-01 | End: 2022-06-29

## 2021-04-29 RX ORDER — IPRATROPIUM BROMIDE AND ALBUTEROL 20; 100 UG/1; UG/1
2 SPRAY, METERED RESPIRATORY (INHALATION) EVERY 4 HOURS PRN
Qty: 12 G | Refills: 3 | Status: ON HOLD | OUTPATIENT
Start: 2021-04-29 | End: 2022-03-18 | Stop reason: HOSPADM

## 2021-05-11 ENCOUNTER — TELEPHONE (OUTPATIENT)
Dept: CARDIOLOGY | Facility: CLINIC | Age: 76
End: 2021-05-11

## 2021-05-11 RX ORDER — NITROGLYCERIN 400 UG/1
1 SPRAY ORAL EVERY 5 MIN PRN
Qty: 12 G | Refills: 0 | Status: SHIPPED | OUTPATIENT
Start: 2021-05-11 | End: 2021-12-08

## 2021-05-11 NOTE — TELEPHONE ENCOUNTER
----- Message from Devonte Hernandez sent at 5/11/2021  3:28 PM CDT -----  nitroGLYCERIN 0.4 MG/DOSE TL SPRY (NITROLINGUAL) 400 mcg/spray spray 12 g 0 12/31/2020  No  Sig: USE ONE SPRAY UNDER THE TONGUE EVERY 5 MINUTES AS NEEDED FOR CHEST PAIN.  DO NOT EXCEED A TOTAL OF 3 SPRAYS IN 15 MINUTES  Sent to pharmacy as: nitroGLYCERIN 0.4 MG/DOSE TL SPRY (NITROLINGUAL) 400 mcg/spray spray  Class: Normal  Order: [id number]  Date/Time Signed: 12/31/2020 10:22      E-Prescribing Status: Receipt confirmed by pharmacy (12/31/2020 10:23 AM CST)  Pharmacy    >>>needs spray   [facility]

## 2021-05-14 ENCOUNTER — TELEPHONE (OUTPATIENT)
Dept: FAMILY MEDICINE | Facility: CLINIC | Age: 76
End: 2021-05-14

## 2021-05-14 ENCOUNTER — TELEPHONE (OUTPATIENT)
Dept: CARDIOLOGY | Facility: CLINIC | Age: 76
End: 2021-05-14

## 2021-05-14 DIAGNOSIS — M15.9 PRIMARY OSTEOARTHRITIS INVOLVING MULTIPLE JOINTS: ICD-10-CM

## 2021-05-14 RX ORDER — HYDROCODONE BITARTRATE AND ACETAMINOPHEN 5; 325 MG/1; MG/1
1 TABLET ORAL EVERY 12 HOURS PRN
Qty: 60 TABLET | Refills: 0 | Status: SHIPPED | OUTPATIENT
Start: 2021-05-14 | End: 2021-10-07

## 2021-05-17 ENCOUNTER — TELEPHONE (OUTPATIENT)
Dept: FAMILY MEDICINE | Facility: CLINIC | Age: 76
End: 2021-05-17

## 2021-07-01 ENCOUNTER — TELEPHONE (OUTPATIENT)
Dept: CARDIOLOGY | Facility: CLINIC | Age: 76
End: 2021-07-01

## 2021-07-06 ENCOUNTER — TELEPHONE (OUTPATIENT)
Dept: FAMILY MEDICINE | Facility: CLINIC | Age: 76
End: 2021-07-06

## 2021-07-07 ENCOUNTER — TELEPHONE (OUTPATIENT)
Dept: FAMILY MEDICINE | Facility: CLINIC | Age: 76
End: 2021-07-07

## 2021-07-07 ENCOUNTER — OFFICE VISIT (OUTPATIENT)
Dept: FAMILY MEDICINE | Facility: CLINIC | Age: 76
End: 2021-07-07
Payer: MEDICARE

## 2021-07-07 VITALS
DIASTOLIC BLOOD PRESSURE: 60 MMHG | HEART RATE: 64 BPM | HEIGHT: 61 IN | BODY MASS INDEX: 36.63 KG/M2 | WEIGHT: 194 LBS | SYSTOLIC BLOOD PRESSURE: 132 MMHG

## 2021-07-07 DIAGNOSIS — K21.9 GASTROESOPHAGEAL REFLUX DISEASE WITHOUT ESOPHAGITIS: ICD-10-CM

## 2021-07-07 DIAGNOSIS — R10.13 EPIGASTRIC PAIN: ICD-10-CM

## 2021-07-07 DIAGNOSIS — I10 ESSENTIAL HYPERTENSION, BENIGN: ICD-10-CM

## 2021-07-07 DIAGNOSIS — I87.2 VENOUS STASIS ULCER OF LEFT CALF LIMITED TO BREAKDOWN OF SKIN WITHOUT VARICOSE VEINS: Primary | ICD-10-CM

## 2021-07-07 DIAGNOSIS — E11.8 DM TYPE 2, CONTROLLED, WITH COMPLICATION: ICD-10-CM

## 2021-07-07 DIAGNOSIS — J44.9 CHRONIC OBSTRUCTIVE PULMONARY DISEASE, UNSPECIFIED COPD TYPE: ICD-10-CM

## 2021-07-07 DIAGNOSIS — J96.11 CHRONIC HYPOXEMIC RESPIRATORY FAILURE: ICD-10-CM

## 2021-07-07 DIAGNOSIS — L97.221 VENOUS STASIS ULCER OF LEFT CALF LIMITED TO BREAKDOWN OF SKIN WITHOUT VARICOSE VEINS: Primary | ICD-10-CM

## 2021-07-07 LAB — HBA1C MFR BLD: 5.2 %

## 2021-07-07 PROCEDURE — 83036 HEMOGLOBIN GLYCOSYLATED A1C: CPT | Mod: QW,,, | Performed by: NURSE PRACTITIONER

## 2021-07-07 PROCEDURE — 83036 POCT HEMOGLOBIN A1C: ICD-10-PCS | Mod: QW,,, | Performed by: NURSE PRACTITIONER

## 2021-07-07 PROCEDURE — 99213 OFFICE O/P EST LOW 20 MIN: CPT | Mod: S$GLB,,, | Performed by: NURSE PRACTITIONER

## 2021-07-07 PROCEDURE — 99213 PR OFFICE/OUTPT VISIT, EST, LEVL III, 20-29 MIN: ICD-10-PCS | Mod: S$GLB,,, | Performed by: NURSE PRACTITIONER

## 2021-07-07 RX ORDER — FUROSEMIDE 20 MG/1
20 TABLET ORAL 2 TIMES DAILY
Qty: 180 TABLET | Refills: 1 | Status: SHIPPED | OUTPATIENT
Start: 2021-07-07 | End: 2022-01-27 | Stop reason: SDUPTHER

## 2021-07-07 RX ORDER — PANTOPRAZOLE SODIUM 20 MG/1
40 TABLET, DELAYED RELEASE ORAL DAILY
Qty: 60 TABLET | Refills: 5 | Status: SHIPPED | OUTPATIENT
Start: 2021-07-07 | End: 2021-07-12

## 2021-07-08 ENCOUNTER — TELEPHONE (OUTPATIENT)
Dept: CARDIOLOGY | Facility: CLINIC | Age: 76
End: 2021-07-08

## 2021-07-12 RX ORDER — PANTOPRAZOLE SODIUM 40 MG/1
40 TABLET, DELAYED RELEASE ORAL DAILY
Qty: 30 TABLET | Refills: 11 | Status: SHIPPED | OUTPATIENT
Start: 2021-07-12 | End: 2022-05-17 | Stop reason: SDUPTHER

## 2021-07-13 ENCOUNTER — TELEPHONE (OUTPATIENT)
Dept: CARDIOLOGY | Facility: CLINIC | Age: 76
End: 2021-07-13

## 2021-07-17 ENCOUNTER — DOCUMENT SCAN (OUTPATIENT)
Dept: HOME HEALTH SERVICES | Facility: HOSPITAL | Age: 76
End: 2021-07-17

## 2021-07-17 ENCOUNTER — EXTERNAL HOME HEALTH (OUTPATIENT)
Dept: HOME HEALTH SERVICES | Facility: HOSPITAL | Age: 76
End: 2021-07-17

## 2021-07-23 ENCOUNTER — TELEPHONE (OUTPATIENT)
Dept: FAMILY MEDICINE | Facility: CLINIC | Age: 76
End: 2021-07-23

## 2021-07-23 DIAGNOSIS — I87.2 VENOUS STASIS DERMATITIS OF BOTH LOWER EXTREMITIES: Primary | ICD-10-CM

## 2021-07-23 RX ORDER — DOXYCYCLINE 100 MG/1
100 CAPSULE ORAL 2 TIMES DAILY
Qty: 20 CAPSULE | Refills: 0 | Status: SHIPPED | OUTPATIENT
Start: 2021-07-23 | End: 2022-01-06

## 2021-07-23 RX ORDER — TRIAMCINOLONE ACETONIDE 1 MG/G
CREAM TOPICAL 2 TIMES DAILY
Qty: 453 G | Refills: 1 | Status: SHIPPED | OUTPATIENT
Start: 2021-07-23 | End: 2022-06-29

## 2021-08-17 ENCOUNTER — TELEPHONE (OUTPATIENT)
Dept: FAMILY MEDICINE | Facility: CLINIC | Age: 76
End: 2021-08-17

## 2021-08-17 DIAGNOSIS — Z79.899 ENCOUNTER FOR LONG-TERM (CURRENT) USE OF OTHER MEDICATIONS: Primary | ICD-10-CM

## 2021-08-17 DIAGNOSIS — I10 ESSENTIAL HYPERTENSION, BENIGN: ICD-10-CM

## 2021-09-02 ENCOUNTER — OFFICE VISIT (OUTPATIENT)
Dept: FAMILY MEDICINE | Facility: CLINIC | Age: 76
End: 2021-09-02
Payer: MEDICARE

## 2021-09-02 DIAGNOSIS — R10.84 GENERALIZED ABDOMINAL PAIN: ICD-10-CM

## 2021-09-02 DIAGNOSIS — J44.9 CHRONIC OBSTRUCTIVE PULMONARY DISEASE, UNSPECIFIED COPD TYPE: ICD-10-CM

## 2021-09-02 DIAGNOSIS — I87.2 VENOUS STASIS ULCER OF LEFT CALF LIMITED TO BREAKDOWN OF SKIN WITHOUT VARICOSE VEINS: Primary | ICD-10-CM

## 2021-09-02 DIAGNOSIS — J96.11 CHRONIC HYPOXEMIC RESPIRATORY FAILURE: ICD-10-CM

## 2021-09-02 DIAGNOSIS — L97.221 VENOUS STASIS ULCER OF LEFT CALF LIMITED TO BREAKDOWN OF SKIN WITHOUT VARICOSE VEINS: Primary | ICD-10-CM

## 2021-09-02 DIAGNOSIS — I10 ESSENTIAL HYPERTENSION, BENIGN: ICD-10-CM

## 2021-09-02 PROCEDURE — 99213 OFFICE O/P EST LOW 20 MIN: CPT | Mod: 95,,, | Performed by: NURSE PRACTITIONER

## 2021-09-02 PROCEDURE — 99213 PR OFFICE/OUTPT VISIT, EST, LEVL III, 20-29 MIN: ICD-10-PCS | Mod: 95,,, | Performed by: NURSE PRACTITIONER

## 2021-09-03 ENCOUNTER — TELEPHONE (OUTPATIENT)
Dept: FAMILY MEDICINE | Facility: CLINIC | Age: 76
End: 2021-09-03

## 2021-09-13 ENCOUNTER — PATIENT OUTREACH (OUTPATIENT)
Dept: HOME HEALTH SERVICES | Facility: HOSPITAL | Age: 76
End: 2021-09-13

## 2021-09-14 ENCOUNTER — EXTERNAL HOME HEALTH (OUTPATIENT)
Dept: HOME HEALTH SERVICES | Facility: HOSPITAL | Age: 76
End: 2021-09-14

## 2021-09-16 ENCOUNTER — TELEPHONE (OUTPATIENT)
Dept: FAMILY MEDICINE | Facility: CLINIC | Age: 76
End: 2021-09-16

## 2021-09-16 DIAGNOSIS — J44.9 CHRONIC OBSTRUCTIVE PULMONARY DISEASE, UNSPECIFIED COPD TYPE: ICD-10-CM

## 2021-09-16 DIAGNOSIS — I87.2 VENOUS STASIS ULCER OF LEFT CALF LIMITED TO BREAKDOWN OF SKIN WITHOUT VARICOSE VEINS: Primary | ICD-10-CM

## 2021-09-16 DIAGNOSIS — L97.221 VENOUS STASIS ULCER OF LEFT CALF LIMITED TO BREAKDOWN OF SKIN WITHOUT VARICOSE VEINS: Primary | ICD-10-CM

## 2021-09-21 ENCOUNTER — LAB VISIT (OUTPATIENT)
Dept: LAB | Facility: HOSPITAL | Age: 76
End: 2021-09-21
Attending: NURSE PRACTITIONER
Payer: MEDICARE

## 2021-09-21 ENCOUNTER — DOCUMENT SCAN (OUTPATIENT)
Dept: HOME HEALTH SERVICES | Facility: HOSPITAL | Age: 76
End: 2021-09-21

## 2021-09-21 DIAGNOSIS — J44.89 OBSTRUCTIVE CHRONIC BRONCHITIS WITHOUT EXACERBATION: Primary | ICD-10-CM

## 2021-09-21 DIAGNOSIS — L97.221 NON-PRESSURE CHRONIC ULCER OF LEFT CALF, LIMITED TO BREAKDOWN OF SKIN: ICD-10-CM

## 2021-09-21 DIAGNOSIS — I87.2 PERIPHERAL VENOUS INSUFFICIENCY: ICD-10-CM

## 2021-09-21 LAB
ALBUMIN SERPL BCP-MCNC: 4.1 G/DL (ref 3.5–5.2)
ALP SERPL-CCNC: 72 U/L (ref 55–135)
ALT SERPL W/O P-5'-P-CCNC: 16 U/L (ref 10–44)
ANION GAP SERPL CALC-SCNC: 8 MMOL/L (ref 8–16)
AST SERPL-CCNC: 18 U/L (ref 10–40)
BASOPHILS # BLD AUTO: 0.04 K/UL (ref 0–0.2)
BASOPHILS NFR BLD: 0.7 % (ref 0–1.9)
BILIRUB SERPL-MCNC: 0.7 MG/DL (ref 0.1–1)
BUN SERPL-MCNC: 38 MG/DL (ref 8–23)
CALCIUM SERPL-MCNC: 9.5 MG/DL (ref 8.7–10.5)
CHLORIDE SERPL-SCNC: 105 MMOL/L (ref 95–110)
CHOLEST SERPL-MCNC: 165 MG/DL (ref 120–199)
CHOLEST/HDLC SERPL: 3.1 {RATIO} (ref 2–5)
CO2 SERPL-SCNC: 28 MMOL/L (ref 23–29)
CREAT SERPL-MCNC: 1.8 MG/DL (ref 0.5–1.4)
DIFFERENTIAL METHOD: ABNORMAL
EOSINOPHIL # BLD AUTO: 0.1 K/UL (ref 0–0.5)
EOSINOPHIL NFR BLD: 1.5 % (ref 0–8)
ERYTHROCYTE [DISTWIDTH] IN BLOOD BY AUTOMATED COUNT: 13.2 % (ref 11.5–14.5)
EST. GFR  (AFRICAN AMERICAN): 31.5 ML/MIN/1.73 M^2
EST. GFR  (NON AFRICAN AMERICAN): 27.3 ML/MIN/1.73 M^2
GLUCOSE SERPL-MCNC: 109 MG/DL (ref 70–110)
HCT VFR BLD AUTO: 41.3 % (ref 37–48.5)
HDLC SERPL-MCNC: 53 MG/DL (ref 40–75)
HDLC SERPL: 32.1 % (ref 20–50)
HGB BLD-MCNC: 12.3 G/DL (ref 12–16)
IMM GRANULOCYTES # BLD AUTO: 0.02 K/UL (ref 0–0.04)
IMM GRANULOCYTES NFR BLD AUTO: 0.3 % (ref 0–0.5)
LDLC SERPL CALC-MCNC: 98.2 MG/DL (ref 63–159)
LYMPHOCYTES # BLD AUTO: 1.2 K/UL (ref 1–4.8)
LYMPHOCYTES NFR BLD: 19.9 % (ref 18–48)
MCH RBC QN AUTO: 28.1 PG (ref 27–31)
MCHC RBC AUTO-ENTMCNC: 29.8 G/DL (ref 32–36)
MCV RBC AUTO: 95 FL (ref 82–98)
MONOCYTES # BLD AUTO: 0.4 K/UL (ref 0.3–1)
MONOCYTES NFR BLD: 6.5 % (ref 4–15)
NEUTROPHILS # BLD AUTO: 4.4 K/UL (ref 1.8–7.7)
NEUTROPHILS NFR BLD: 71.1 % (ref 38–73)
NONHDLC SERPL-MCNC: 112 MG/DL
NRBC BLD-RTO: 0 /100 WBC
PLATELET # BLD AUTO: 174 K/UL (ref 150–450)
PMV BLD AUTO: 11.9 FL (ref 9.2–12.9)
POTASSIUM SERPL-SCNC: 5.6 MMOL/L (ref 3.5–5.1)
PROT SERPL-MCNC: 7.2 G/DL (ref 6–8.4)
RBC # BLD AUTO: 4.37 M/UL (ref 4–5.4)
SODIUM SERPL-SCNC: 141 MMOL/L (ref 136–145)
TRIGL SERPL-MCNC: 69 MG/DL (ref 30–150)
TSH SERPL DL<=0.005 MIU/L-ACNC: 0.34 UIU/ML (ref 0.34–5.6)
WBC # BLD AUTO: 6.12 K/UL (ref 3.9–12.7)

## 2021-09-21 PROCEDURE — 80061 LIPID PANEL: CPT | Performed by: NURSE PRACTITIONER

## 2021-09-21 PROCEDURE — 36415 COLL VENOUS BLD VENIPUNCTURE: CPT | Performed by: NURSE PRACTITIONER

## 2021-09-21 PROCEDURE — 85025 COMPLETE CBC W/AUTO DIFF WBC: CPT | Performed by: NURSE PRACTITIONER

## 2021-09-21 PROCEDURE — 84443 ASSAY THYROID STIM HORMONE: CPT | Performed by: NURSE PRACTITIONER

## 2021-09-21 PROCEDURE — 80053 COMPREHEN METABOLIC PANEL: CPT | Performed by: NURSE PRACTITIONER

## 2021-09-22 ENCOUNTER — TELEPHONE (OUTPATIENT)
Dept: FAMILY MEDICINE | Facility: CLINIC | Age: 76
End: 2021-09-22

## 2021-09-24 ENCOUNTER — LAB VISIT (OUTPATIENT)
Dept: LAB | Facility: HOSPITAL | Age: 76
End: 2021-09-24
Payer: MEDICARE

## 2021-09-24 DIAGNOSIS — I87.2 PERIPHERAL VENOUS INSUFFICIENCY: ICD-10-CM

## 2021-09-24 DIAGNOSIS — L97.221 NON-PRESSURE CHRONIC ULCER OF LEFT CALF, LIMITED TO BREAKDOWN OF SKIN: ICD-10-CM

## 2021-09-24 DIAGNOSIS — J44.89 OBSTRUCTIVE CHRONIC BRONCHITIS WITHOUT EXACERBATION: Primary | ICD-10-CM

## 2021-09-24 LAB
ALBUMIN/CREAT UR: 55.2 UG/MG (ref 0–30)
BILIRUB UR QL STRIP: NEGATIVE
CLARITY UR: CLEAR
COLOR UR: YELLOW
CREAT UR-MCNC: 61 MG/DL (ref 15–325)
GLUCOSE UR QL STRIP: NEGATIVE
HGB UR QL STRIP: NEGATIVE
KETONES UR QL STRIP: NEGATIVE
LEUKOCYTE ESTERASE UR QL STRIP: NEGATIVE
MICROALBUMIN UR DL<=1MG/L-MCNC: 33.7 UG/ML
NITRITE UR QL STRIP: NEGATIVE
PH UR STRIP: 7 [PH] (ref 5–8)
PROT UR QL STRIP: NEGATIVE
SP GR UR STRIP: 1.01 (ref 1–1.03)
URN SPEC COLLECT METH UR: NORMAL
UROBILINOGEN UR STRIP-ACNC: NEGATIVE EU/DL

## 2021-09-24 PROCEDURE — 87186 SC STD MICRODIL/AGAR DIL: CPT

## 2021-09-24 PROCEDURE — 87077 CULTURE AEROBIC IDENTIFY: CPT

## 2021-09-24 PROCEDURE — 82570 ASSAY OF URINE CREATININE: CPT

## 2021-09-24 PROCEDURE — 81003 URINALYSIS AUTO W/O SCOPE: CPT

## 2021-09-24 PROCEDURE — 87086 URINE CULTURE/COLONY COUNT: CPT

## 2021-09-26 ENCOUNTER — TELEPHONE (OUTPATIENT)
Dept: FAMILY MEDICINE | Facility: CLINIC | Age: 76
End: 2021-09-26

## 2021-09-26 LAB — BACTERIA UR CULT: ABNORMAL

## 2021-10-04 ENCOUNTER — TELEPHONE (OUTPATIENT)
Dept: FAMILY MEDICINE | Facility: CLINIC | Age: 76
End: 2021-10-04
Payer: MEDICARE

## 2021-10-07 ENCOUNTER — OFFICE VISIT (OUTPATIENT)
Dept: CARDIOLOGY | Facility: CLINIC | Age: 76
End: 2021-10-07
Payer: MEDICARE

## 2021-10-07 ENCOUNTER — OFFICE VISIT (OUTPATIENT)
Dept: PULMONOLOGY | Facility: CLINIC | Age: 76
End: 2021-10-07
Payer: MEDICARE

## 2021-10-07 VITALS
HEART RATE: 50 BPM | SYSTOLIC BLOOD PRESSURE: 175 MMHG | DIASTOLIC BLOOD PRESSURE: 70 MMHG | HEIGHT: 61 IN | BODY MASS INDEX: 36.44 KG/M2 | HEIGHT: 61 IN | SYSTOLIC BLOOD PRESSURE: 128 MMHG | WEIGHT: 193 LBS | HEART RATE: 46 BPM | WEIGHT: 193 LBS | DIASTOLIC BLOOD PRESSURE: 67 MMHG | BODY MASS INDEX: 36.44 KG/M2 | OXYGEN SATURATION: 98 % | OXYGEN SATURATION: 98 %

## 2021-10-07 DIAGNOSIS — E78.00 HYPERCHOLESTEROLEMIA: ICD-10-CM

## 2021-10-07 DIAGNOSIS — R91.1 SOLITARY PULMONARY NODULE: ICD-10-CM

## 2021-10-07 DIAGNOSIS — J44.9 CHRONIC OBSTRUCTIVE PULMONARY DISEASE, UNSPECIFIED COPD TYPE: Primary | ICD-10-CM

## 2021-10-07 DIAGNOSIS — I50.9 CONGESTIVE HEART FAILURE, UNSPECIFIED HF CHRONICITY, UNSPECIFIED HEART FAILURE TYPE: ICD-10-CM

## 2021-10-07 DIAGNOSIS — R60.0 LOCALIZED EDEMA: ICD-10-CM

## 2021-10-07 DIAGNOSIS — J44.9 CHRONIC OBSTRUCTIVE PULMONARY DISEASE, UNSPECIFIED COPD TYPE: ICD-10-CM

## 2021-10-07 DIAGNOSIS — I10 ESSENTIAL HYPERTENSION, BENIGN: ICD-10-CM

## 2021-10-07 DIAGNOSIS — R00.1 BRADYCARDIA: Primary | ICD-10-CM

## 2021-10-07 DIAGNOSIS — J96.11 CHRONIC HYPOXEMIC RESPIRATORY FAILURE: ICD-10-CM

## 2021-10-07 PROCEDURE — 99214 OFFICE O/P EST MOD 30 MIN: CPT | Mod: S$GLB,,, | Performed by: NURSE PRACTITIONER

## 2021-10-07 PROCEDURE — 99214 PR OFFICE/OUTPT VISIT, EST, LEVL IV, 30-39 MIN: ICD-10-PCS | Mod: S$GLB,,, | Performed by: NURSE PRACTITIONER

## 2021-10-07 PROCEDURE — 99214 OFFICE O/P EST MOD 30 MIN: CPT | Mod: S$GLB,,, | Performed by: INTERNAL MEDICINE

## 2021-10-07 PROCEDURE — 93000 EKG 12-LEAD: ICD-10-PCS | Mod: S$GLB,,, | Performed by: INTERNAL MEDICINE

## 2021-10-07 PROCEDURE — 93000 ELECTROCARDIOGRAM COMPLETE: CPT | Mod: S$GLB,,, | Performed by: INTERNAL MEDICINE

## 2021-10-07 PROCEDURE — 99214 PR OFFICE/OUTPT VISIT, EST, LEVL IV, 30-39 MIN: ICD-10-PCS | Mod: S$GLB,,, | Performed by: INTERNAL MEDICINE

## 2021-10-07 RX ORDER — LOVASTATIN 40 MG/1
40 TABLET ORAL NIGHTLY
Qty: 90 TABLET | Refills: 3 | Status: SHIPPED | OUTPATIENT
Start: 2021-10-07 | End: 2021-12-27 | Stop reason: SDUPTHER

## 2021-10-07 RX ORDER — METOPROLOL SUCCINATE 50 MG/1
25 TABLET, EXTENDED RELEASE ORAL DAILY
Qty: 90 TABLET | Refills: 1 | Status: SHIPPED | OUTPATIENT
Start: 2021-10-07 | End: 2022-01-27 | Stop reason: SDUPTHER

## 2021-10-14 ENCOUNTER — TELEPHONE (OUTPATIENT)
Dept: FAMILY MEDICINE | Facility: CLINIC | Age: 76
End: 2021-10-14

## 2021-10-19 ENCOUNTER — TELEPHONE (OUTPATIENT)
Dept: FAMILY MEDICINE | Facility: CLINIC | Age: 76
End: 2021-10-19

## 2021-10-19 ENCOUNTER — HOSPITAL ENCOUNTER (OUTPATIENT)
Dept: RADIOLOGY | Facility: HOSPITAL | Age: 76
Discharge: HOME | End: 2021-10-19
Attending: NURSE PRACTITIONER
Payer: MEDICARE

## 2021-10-19 DIAGNOSIS — R91.1 SOLITARY PULMONARY NODULE: ICD-10-CM

## 2021-10-19 PROCEDURE — 71250 CT THORAX DX C-: CPT | Mod: TC,PO

## 2021-10-20 ENCOUNTER — TELEPHONE (OUTPATIENT)
Dept: PULMONOLOGY | Facility: CLINIC | Age: 76
End: 2021-10-20

## 2021-10-26 ENCOUNTER — TELEPHONE (OUTPATIENT)
Dept: FAMILY MEDICINE | Facility: CLINIC | Age: 76
End: 2021-10-26
Payer: MEDICARE

## 2021-10-26 DIAGNOSIS — Z79.899 ENCOUNTER FOR LONG-TERM (CURRENT) USE OF OTHER MEDICATIONS: Primary | ICD-10-CM

## 2021-10-29 ENCOUNTER — LAB VISIT (OUTPATIENT)
Dept: LAB | Facility: HOSPITAL | Age: 76
End: 2021-10-29
Attending: FAMILY MEDICINE
Payer: MEDICARE

## 2021-10-29 DIAGNOSIS — Z79.899 ENCOUNTER FOR LONG-TERM (CURRENT) USE OF OTHER MEDICATIONS: ICD-10-CM

## 2021-10-29 LAB
ANION GAP SERPL CALC-SCNC: 11 MMOL/L (ref 8–16)
BUN SERPL-MCNC: 52 MG/DL (ref 8–23)
CALCIUM SERPL-MCNC: 9.6 MG/DL (ref 8.7–10.5)
CHLORIDE SERPL-SCNC: 104 MMOL/L (ref 95–110)
CO2 SERPL-SCNC: 31 MMOL/L (ref 23–29)
CREAT SERPL-MCNC: 2.2 MG/DL (ref 0.5–1.4)
EST. GFR  (AFRICAN AMERICAN): 24.7 ML/MIN/1.73 M^2
EST. GFR  (NON AFRICAN AMERICAN): 21.4 ML/MIN/1.73 M^2
GLUCOSE SERPL-MCNC: 103 MG/DL (ref 70–110)
POTASSIUM SERPL-SCNC: 4.6 MMOL/L (ref 3.5–5.1)
SODIUM SERPL-SCNC: 146 MMOL/L (ref 136–145)

## 2021-10-29 PROCEDURE — 36415 COLL VENOUS BLD VENIPUNCTURE: CPT | Performed by: FAMILY MEDICINE

## 2021-10-29 PROCEDURE — 80048 BASIC METABOLIC PNL TOTAL CA: CPT | Performed by: FAMILY MEDICINE

## 2021-11-01 ENCOUNTER — TELEPHONE (OUTPATIENT)
Dept: CARDIOLOGY | Facility: CLINIC | Age: 76
End: 2021-11-01
Payer: MEDICARE

## 2021-11-02 RX ORDER — AMLODIPINE BESYLATE 2.5 MG/1
2.5 TABLET ORAL DAILY
Qty: 30 TABLET | Refills: 11 | Status: ON HOLD | OUTPATIENT
Start: 2021-11-02 | End: 2022-02-10 | Stop reason: SDUPTHER

## 2021-11-03 ENCOUNTER — TELEPHONE (OUTPATIENT)
Dept: CARDIOLOGY | Facility: CLINIC | Age: 76
End: 2021-11-03
Payer: MEDICARE

## 2021-11-05 ENCOUNTER — TELEPHONE (OUTPATIENT)
Dept: CARDIOLOGY | Facility: CLINIC | Age: 76
End: 2021-11-05
Payer: MEDICARE

## 2021-11-05 ENCOUNTER — NURSE TRIAGE (OUTPATIENT)
Dept: ADMINISTRATIVE | Facility: CLINIC | Age: 76
End: 2021-11-05
Payer: MEDICARE

## 2021-11-07 ENCOUNTER — TELEPHONE (OUTPATIENT)
Dept: FAMILY MEDICINE | Facility: CLINIC | Age: 76
End: 2021-11-07
Payer: MEDICARE

## 2021-11-08 ENCOUNTER — TELEPHONE (OUTPATIENT)
Dept: FAMILY MEDICINE | Facility: CLINIC | Age: 76
End: 2021-11-08
Payer: MEDICARE

## 2021-11-09 ENCOUNTER — PATIENT OUTREACH (OUTPATIENT)
Dept: HOME HEALTH SERVICES | Facility: HOSPITAL | Age: 76
End: 2021-11-09
Payer: MEDICARE

## 2021-11-09 ENCOUNTER — EXTERNAL HOME HEALTH (OUTPATIENT)
Dept: HOME HEALTH SERVICES | Facility: HOSPITAL | Age: 76
End: 2021-11-09
Payer: MEDICARE

## 2021-11-17 ENCOUNTER — TELEPHONE (OUTPATIENT)
Dept: CARDIOLOGY | Facility: CLINIC | Age: 76
End: 2021-11-17
Payer: MEDICARE

## 2021-11-17 NOTE — TELEPHONE ENCOUNTER
----- Message from Nuha Velasquez sent at 11/17/2021 12:42 PM CST -----  Type: Needs Medical Advice  Who Called:  Pt Daughter  Best Call Back Number: [phone] or [phone]  Additional Information: Calling to give an update in mothers BP (157/83 this morning)--please advise--Thank you

## 2021-12-27 RX ORDER — LOVASTATIN 40 MG/1
20 TABLET ORAL NIGHTLY
Qty: 45 TABLET | Refills: 3 | Status: SHIPPED | OUTPATIENT
Start: 2021-12-27 | End: 2022-02-21

## 2021-12-27 NOTE — TELEPHONE ENCOUNTER
----- Message from Reddy Reeves sent at 12/27/2021  3:30 PM CST -----  Type:  RX Refill Request    Who Called:  pt  Refill or New Rx:  refill  RX Name and Strength:  Lovastatin  20mg  How is the patient currently taking it? (ex. 1XDay):  as directed  Is this a 30 day or 90 day RX:  90  Preferred Pharmacy with phone number:    [facility] [address] [place], [place] - [address]  [address] [facility] [address]  Phone: [phone] Fax: [phone]    Local or Mail Order:  local  Ordering Provider:  Cassidy Ye Call Back Number:  [phone]  Additional Information:  pt called in said that her cholesterol med was reduced to 20mg from 40mg.  She needs the 20mg Lovastatin refilled.  Please advise.

## 2022-01-06 ENCOUNTER — LAB VISIT (OUTPATIENT)
Dept: LAB | Facility: HOSPITAL | Age: 77
End: 2022-01-06
Attending: INTERNAL MEDICINE
Payer: MEDICARE

## 2022-01-06 ENCOUNTER — OFFICE VISIT (OUTPATIENT)
Dept: CARDIOLOGY | Facility: CLINIC | Age: 77
End: 2022-01-06
Payer: MEDICARE

## 2022-01-06 VITALS
HEART RATE: 52 BPM | HEIGHT: 61 IN | WEIGHT: 185 LBS | SYSTOLIC BLOOD PRESSURE: 130 MMHG | BODY MASS INDEX: 34.93 KG/M2 | OXYGEN SATURATION: 98 % | DIASTOLIC BLOOD PRESSURE: 70 MMHG

## 2022-01-06 DIAGNOSIS — I25.118 ATHEROSCLEROSIS OF NATIVE CORONARY ARTERY OF NATIVE HEART WITH STABLE ANGINA PECTORIS: ICD-10-CM

## 2022-01-06 DIAGNOSIS — I10 PRIMARY HYPERTENSION: ICD-10-CM

## 2022-01-06 DIAGNOSIS — E66.01 CLASS 2 SEVERE OBESITY DUE TO EXCESS CALORIES WITH SERIOUS COMORBIDITY AND BODY MASS INDEX (BMI) OF 37.0 TO 37.9 IN ADULT: ICD-10-CM

## 2022-01-06 DIAGNOSIS — I10 ESSENTIAL HYPERTENSION, BENIGN: ICD-10-CM

## 2022-01-06 DIAGNOSIS — I10 ESSENTIAL HYPERTENSION: ICD-10-CM

## 2022-01-06 DIAGNOSIS — E78.00 HYPERCHOLESTEROLEMIA: ICD-10-CM

## 2022-01-06 DIAGNOSIS — E78.00 HYPERCHOLESTEROLEMIA: Primary | ICD-10-CM

## 2022-01-06 DIAGNOSIS — E78.00 PURE HYPERCHOLESTEROLEMIA: ICD-10-CM

## 2022-01-06 DIAGNOSIS — N18.32 STAGE 3B CHRONIC KIDNEY DISEASE: ICD-10-CM

## 2022-01-06 LAB
ANION GAP SERPL CALC-SCNC: 9 MMOL/L (ref 8–16)
BUN SERPL-MCNC: 23 MG/DL (ref 8–23)
CALCIUM SERPL-MCNC: 9.5 MG/DL (ref 8.7–10.5)
CHLORIDE SERPL-SCNC: 103 MMOL/L (ref 95–110)
CO2 SERPL-SCNC: 28 MMOL/L (ref 23–29)
CREAT SERPL-MCNC: 1.3 MG/DL (ref 0.5–1.4)
ERYTHROCYTE [DISTWIDTH] IN BLOOD BY AUTOMATED COUNT: 12.7 % (ref 11.5–14.5)
EST. GFR  (AFRICAN AMERICAN): 46.7 ML/MIN/1.73 M^2
EST. GFR  (NON AFRICAN AMERICAN): 40.5 ML/MIN/1.73 M^2
GLUCOSE SERPL-MCNC: 99 MG/DL (ref 70–110)
HCT VFR BLD AUTO: 41.2 % (ref 37–48.5)
HGB BLD-MCNC: 12.5 G/DL (ref 12–16)
MCH RBC QN AUTO: 28.2 PG (ref 27–31)
MCHC RBC AUTO-ENTMCNC: 30.3 G/DL (ref 32–36)
MCV RBC AUTO: 93 FL (ref 82–98)
PLATELET # BLD AUTO: 163 K/UL (ref 150–450)
PMV BLD AUTO: 11.4 FL (ref 9.2–12.9)
POTASSIUM SERPL-SCNC: 5.2 MMOL/L (ref 3.5–5.1)
RBC # BLD AUTO: 4.44 M/UL (ref 4–5.4)
SODIUM SERPL-SCNC: 140 MMOL/L (ref 136–145)
WBC # BLD AUTO: 6.52 K/UL (ref 3.9–12.7)

## 2022-01-06 PROCEDURE — 36415 COLL VENOUS BLD VENIPUNCTURE: CPT | Performed by: INTERNAL MEDICINE

## 2022-01-06 PROCEDURE — 99213 OFFICE O/P EST LOW 20 MIN: CPT | Mod: S$GLB,,, | Performed by: INTERNAL MEDICINE

## 2022-01-06 PROCEDURE — 99213 PR OFFICE/OUTPT VISIT, EST, LEVL III, 20-29 MIN: ICD-10-PCS | Mod: S$GLB,,, | Performed by: INTERNAL MEDICINE

## 2022-01-06 PROCEDURE — 85027 COMPLETE CBC AUTOMATED: CPT | Performed by: INTERNAL MEDICINE

## 2022-01-06 PROCEDURE — 80048 BASIC METABOLIC PNL TOTAL CA: CPT | Performed by: INTERNAL MEDICINE

## 2022-01-06 RX ORDER — BENAZEPRIL HYDROCHLORIDE 40 MG/1
40 TABLET ORAL DAILY
Qty: 90 TABLET | Refills: 1 | Status: SHIPPED | OUTPATIENT
Start: 2022-01-06 | End: 2022-02-21

## 2022-01-06 NOTE — PROGRESS NOTES
Patient ID:  Marisol Kerr is a 76 y.o. female who presents for follow-up of Congestive Heart Failure, Coronary Artery Disease, and Chest Pain (More with exertion or any kind of activity.)      She is having exertional angina.  This is been going on since Thanksgiving.  She feels she needs a coronary arteriogram.  The last coronary arteriogram performed on 01/23/2015 she had a stent in the proximal circumflex 2.5 x 12 mm the last BUN and creatinine were moderately elevated.      Past Medical History:   Diagnosis Date    CAD (coronary artery disease)     Cancer     colon    CHF (congestive heart failure)     Colitis     Colon cancer     COPD (chronic obstructive pulmonary disease)     Decreased hearing     Diabetes mellitus     Diabetes mellitus, type 2     Hypercholesterolemia     Hypertension     Hypoxemia     Insomnia     Obesity     Osteoarthritis         Past Surgical History:   Procedure Laterality Date    CARDIAC CATHETERIZATION      CHOLECYSTECTOMY      COLECTOMY      CORONARY ANGIOPLASTY      CORONARY STENT PLACEMENT      EXTERNAL EAR SURGERY      EYE SURGERY      FLEXIBLE SIGMOIDOSCOPY N/A 9/19/2019    Procedure: SIGMOIDOSCOPY, FLEXIBLE;  Surgeon: Biju Kramer III, MD;  Location: [facility];  Service: Endoscopy;  Laterality: N/A;    HYSTERECTOMY      partial          Current Outpatient Medications   Medication Instructions    acetaminophen (TYLENOL) 500 mg, Oral, Every 6 hours PRN    albuterol (VENTOLIN HFA) 90 mcg/actuation inhaler 2 puffs, Inhalation, Every 6 hours PRN, Rescue     amLODIPine (NORVASC) 2.5 mg, Oral, Daily    aspirin (ECOTRIN) 81 mg, Oral, Every morning    benazepriL (LOTENSIN) 40 mg, Oral, Daily    bromfenac (PROLENSA) 0.07 % Drop 1 drop, Both Eyes, Daily PRN    coenzyme Q10 100 mg, Oral, Every morning    COVID-19 vacc,mRNA,Moderna,/PF (MODERNA COVID-19 VACCINE, EUA, IM) ADMINISTER 0.5ML IN THE MUSCLE AS DIRECTED    dextran 70-hypromellose 0.1-0.3  % Drop 1 drop, Both Eyes, Daily PRN    ergocalciferol (VITAMIN D2) 50,000 Units, Oral, Every 7 days    fish oil-omega-3 fatty acids 300-1,000 mg capsule 1 capsule, Oral, Every morning    FLAXSEED OIL ORAL 1,200 mg, Oral, Every morning    fluticasone-salmeterol diskus inhaler 250-50 mcg 1 puff, Inhalation, 2 times daily    furosemide (LASIX) 20 mg, Oral, Every other day    INCRUSE ELLIPTA 63 mcg, Inhalation, Every morning, Controller     ipratropium-albuteroL (COMBIVENT RESPIMAT)  mcg/actuation inhaler 2 puffs, Inhalation, Every 4 hours PRN, Rescue    lovastatin (MEVACOR) 20 mg, Oral, Nightly    meloxicam (MOBIC) 7.5 mg, Oral, Daily    metoprolol succinate (TOPROL-XL) 25 mg, Oral, Daily    NARCAN 4 mg/actuation Spry No dose, route, or frequency recorded.    nitroGLYCERIN 0.2 mg/hr TD PT24 (NITRODUR) 0.2 mg/hr APPLY 1 PATCH TOPICALLY ONCE DAILY TO LEG.    nitroGLYCERIN 0.4 MG/DOSE TL SPRY (NITROLINGUAL) 400 mcg/spray spray USE ONE SPRAY UNDER THE TONGUE EVERY 5 MINUTES AS NEEDED FOR CHEST PAIN.  DO NOT EXCEED A TOTAL OF 3 SPRAYS IN 15 MINUTES    ondansetron (ZOFRAN-ODT) 8 mg, Oral, Every 6 hours PRN    pantoprazole (PROTONIX) 40 mg, Oral, Daily    PNV NO.115/IRON FUMARATE/FA (PRENATAL #115-IRON-FOLIC ACID ORAL) 1 tablet, Oral, Every morning    spironolactone (ALDACTONE) 25 mg, Oral, Daily    temazepam (RESTORIL) 15 mg, Oral, Nightly    triamcinolone acetonide 0.1% (KENALOG) 0.1 % cream Topical (Top), 2 times daily, For legs.    vit C/E/Zn/coppr/lutein/zeaxan (PRESERVISION AREDS-2 ORAL) 1 capsule, Oral, Every morning        Review of patient's allergies indicates:   Allergen Reactions    Iodinated contrast media     Strawberries [strawberry] Hives and Itching        Review of Systems   Cardiovascular: Positive for chest pain and dyspnea on exertion. Negative for leg swelling.   Respiratory: Negative for cough and shortness of breath.         Objective:     Vitals:    01/06/22 1143   BP:  "130/70   BP Location: Left arm   Patient Position: Sitting   BP Method: Medium (Manual)   Pulse: (!) 52   SpO2: 98%   Weight: 83.9 kg (185 lb)   Height: 5' 1" (1.549 m)       Physical Exam  CMP  Sodium   Date Value Ref Range Status   01/06/2022 140 136 - 145 mmol/L Final     Potassium   Date Value Ref Range Status   01/06/2022 5.2 (H) 3.5 - 5.1 mmol/L Final     Chloride   Date Value Ref Range Status   01/06/2022 103 95 - 110 mmol/L Final     CO2   Date Value Ref Range Status   01/06/2022 28 23 - 29 mmol/L Final     Glucose   Date Value Ref Range Status   01/06/2022 99 70 - 110 mg/dL Final     BUN   Date Value Ref Range Status   01/06/2022 23 8 - 23 mg/dL Final     Creatinine   Date Value Ref Range Status   01/06/2022 1.3 0.5 - 1.4 mg/dL Final     Calcium   Date Value Ref Range Status   01/06/2022 9.5 8.7 - 10.5 mg/dL Final     Total Protein   Date Value Ref Range Status   09/21/2021 7.2 6.0 - 8.4 g/dL Final     Albumin   Date Value Ref Range Status   09/21/2021 4.1 3.5 - 5.2 g/dL Final     Total Bilirubin   Date Value Ref Range Status   09/21/2021 0.7 0.1 - 1.0 mg/dL Final     Comment:     For infants and newborns, interpretation of results should be based  on gestational age, weight and in agreement with clinical  observations.    Premature Infant recommended reference ranges:  Up to 24 hours.............<8.0 mg/dL  Up to 48 hours............<12.0 mg/dL  3-5 days..................<15.0 mg/dL  6-29 days.................<15.0 mg/dL       Alkaline Phosphatase   Date Value Ref Range Status   09/21/2021 72 55 - 135 U/L Final     AST   Date Value Ref Range Status   09/21/2021 18 10 - 40 U/L Final     ALT   Date Value Ref Range Status   09/21/2021 16 10 - 44 U/L Final     Anion Gap   Date Value Ref Range Status   01/06/2022 9 8 - 16 mmol/L Final     eGFR if    Date Value Ref Range Status   01/06/2022 46.7 (A) >60 mL/min/1.73 m^2 Final     eGFR if non    Date Value Ref Range Status "   01/06/2022 40.5 (A) >60 mL/min/1.73 m^2 Final     Comment:     Calculation used to obtain the estimated glomerular filtration  rate (eGFR) is the CKD-EPI equation.         BMP  Lab Results   Component Value Date     01/06/2022    K 5.2 (H) 01/06/2022     01/06/2022    CO2 28 01/06/2022    BUN 23 01/06/2022    CREATININE 1.3 01/06/2022    CALCIUM 9.5 01/06/2022    ANIONGAP 9 01/06/2022    ESTGFRAFRICA 46.7 (A) 01/06/2022    EGFRNONAA 40.5 (A) 01/06/2022      BNP  @LABRCNTIP(BNP,BNPTRIAGEBLO)@   Lab Results   Component Value Date    CHOL 165 09/21/2021    CHOL 151 01/07/2021    CHOL 131 09/01/2020     Lab Results   Component Value Date    HDL 53 09/21/2021    HDL 48 01/07/2021    HDL 44 09/01/2020     Lab Results   Component Value Date    LDLCALC 98.2 09/21/2021    LDLCALC 81.2 01/07/2021    LDLCALC 73.8 09/01/2020     Lab Results   Component Value Date    TRIG 69 09/21/2021    TRIG 109 01/07/2021    TRIG 66 09/01/2020     Lab Results   Component Value Date    CHOLHDL 32.1 09/21/2021    CHOLHDL 31.8 01/07/2021    CHOLHDL 33.6 09/01/2020      Lab Results   Component Value Date    TSH 0.340 09/21/2021    FREET4 0.88 09/01/2020     Lab Results   Component Value Date    HGBA1C 5.2 01/27/2022     Lab Results   Component Value Date    WBC 6.52 01/06/2022    HGB 12.5 01/06/2022    HCT 41.2 01/06/2022    MCV 93 01/06/2022     01/06/2022         Results for orders placed during the hospital encounter of 09/16/19    Echo Color Flow Doppler? Yes    Interpretation Summary  · Concentric left ventricular hypertrophy.  · Normal left ventricular systolic function. The estimated ejection fraction is 65%  · Normal LV diastolic function.  · Normal right ventricular systolic function.  · Calculated RVSP is 28mmhg.     No results found for this or any previous visit.         Assessment:       Coronary atherosclerosis  CAD with multiple percutaneous revascularization the last 1 performed on 01/23/2015.  She is having  symptoms of angina.  She is having chronic kidney disease.  Will recheck her kidneys and consider a coronary arteriogram.    Hypertension  Controlled on current medications    Pure hypercholesterolemia  LDL cholesterol has been steadily going up this has been discussed with the patient in detail    CKD (chronic kidney disease), stage III  Worse than before will repeat blood work today    Class 2 obesity in adult  Aware       Plan:       Check labs today specially the kidneys if the renal function is doing better consider coronary arteriogram.  She will be seen in the office in 1 month

## 2022-01-10 DIAGNOSIS — K21.9 GASTROESOPHAGEAL REFLUX DISEASE WITHOUT ESOPHAGITIS: ICD-10-CM

## 2022-01-10 DIAGNOSIS — E55.9 VITAMIN D DEFICIENCY: ICD-10-CM

## 2022-01-10 RX ORDER — MELOXICAM 7.5 MG/1
7.5 TABLET ORAL DAILY
Qty: 90 TABLET | Refills: 1 | Status: ON HOLD | OUTPATIENT
Start: 2022-01-10 | End: 2022-02-10 | Stop reason: HOSPADM

## 2022-01-10 RX ORDER — ONDANSETRON 4 MG/1
8 TABLET, ORALLY DISINTEGRATING ORAL EVERY 6 HOURS PRN
Qty: 100 TABLET | Refills: 3 | Status: SHIPPED | OUTPATIENT
Start: 2022-01-10 | End: 2022-06-29

## 2022-01-10 RX ORDER — ERGOCALCIFEROL 1.25 MG/1
50000 CAPSULE ORAL
Qty: 12 CAPSULE | Refills: 1 | Status: SHIPPED | OUTPATIENT
Start: 2022-01-10 | End: 2022-09-26 | Stop reason: SDUPTHER

## 2022-01-10 NOTE — TELEPHONE ENCOUNTER
----- Message from Anni López sent at 1/10/2022 12:06 PM CST -----  Patient called and stated that he pharmacy advised that she need a refill of her  ergocalcifero cholesterol medicine 20 mg, meloxicam, and her  ondansetron called into [facility] on [address] if any questions please give her a call at [phone]

## 2022-01-12 ENCOUNTER — TELEPHONE (OUTPATIENT)
Dept: CARDIOLOGY | Facility: CLINIC | Age: 77
End: 2022-01-12
Payer: MEDICARE

## 2022-01-12 NOTE — TELEPHONE ENCOUNTER
----- Message from Marquise Gomez sent at 1/12/2022  2:23 PM CST -----  Type: Needs Medical Advice  Who Called:  Marisol Colvin's Daughter    Best Call Back Number: [phone]  Additional Information: Sts she's calling on behalf of her mother.  They have not  heard anything back yet about the Labs the pt had on 1/6. Requesting call back.  Please advise -- Thank you

## 2022-01-13 NOTE — TELEPHONE ENCOUNTER
----- Message from Ines Corona sent at 1/13/2022  3:09 PM CST -----  Regarding: lab results  Type:  Test Results    Who Called: Marisol daughter  Name of Test (Lab/Mammo/Etc): lab  Date of Test:  01/06  Ordering Provider:  Dr Benjamin  Where the test was performed:  [facility] Call Back Number:  [phone]  daughter phone number      Additional Information:  pt daughter would like   for provider to call to discuss results

## 2022-01-13 NOTE — TELEPHONE ENCOUNTER
S/w pt's daughter & advised. Advised will have dr hutson put in orders for angio tomorrow,. Then will call her back with date & time of angiogram

## 2022-01-13 NOTE — TELEPHONE ENCOUNTER
Your kidneys are doing better than what we thought.  We can set you up for an angiogram in early February.  Your potassium is mildly elevated therefore take the spironolactone every other day.

## 2022-01-27 ENCOUNTER — OFFICE VISIT (OUTPATIENT)
Dept: FAMILY MEDICINE | Facility: CLINIC | Age: 77
End: 2022-01-27
Payer: MEDICARE

## 2022-01-27 VITALS
DIASTOLIC BLOOD PRESSURE: 68 MMHG | OXYGEN SATURATION: 98 % | BODY MASS INDEX: 37.31 KG/M2 | WEIGHT: 197.63 LBS | SYSTOLIC BLOOD PRESSURE: 130 MMHG | HEIGHT: 61 IN | HEART RATE: 76 BPM

## 2022-01-27 DIAGNOSIS — I87.2 VENOUS INSUFFICIENCY OF BOTH LOWER EXTREMITIES: ICD-10-CM

## 2022-01-27 DIAGNOSIS — Z13.820 SCREENING FOR OSTEOPOROSIS: ICD-10-CM

## 2022-01-27 DIAGNOSIS — R91.1 PULMONARY NODULE: ICD-10-CM

## 2022-01-27 DIAGNOSIS — F51.01 PRIMARY INSOMNIA: ICD-10-CM

## 2022-01-27 DIAGNOSIS — E11.8 DM TYPE 2, CONTROLLED, WITH COMPLICATION: ICD-10-CM

## 2022-01-27 DIAGNOSIS — I25.118 CORONARY ARTERY DISEASE OF NATIVE ARTERY OF NATIVE HEART WITH STABLE ANGINA PECTORIS: ICD-10-CM

## 2022-01-27 DIAGNOSIS — Z12.31 SCREENING MAMMOGRAM, ENCOUNTER FOR: ICD-10-CM

## 2022-01-27 DIAGNOSIS — J43.1 PANLOBULAR EMPHYSEMA: Primary | ICD-10-CM

## 2022-01-27 DIAGNOSIS — N18.32 STAGE 3B CHRONIC KIDNEY DISEASE: ICD-10-CM

## 2022-01-27 DIAGNOSIS — I10 ESSENTIAL HYPERTENSION, BENIGN: ICD-10-CM

## 2022-01-27 DIAGNOSIS — Z78.0 MENOPAUSE: ICD-10-CM

## 2022-01-27 DIAGNOSIS — J96.11 CHRONIC HYPOXEMIC RESPIRATORY FAILURE: ICD-10-CM

## 2022-01-27 LAB — HBA1C MFR BLD: 5.2 %

## 2022-01-27 PROCEDURE — 99214 OFFICE O/P EST MOD 30 MIN: CPT | Mod: S$GLB,,, | Performed by: NURSE PRACTITIONER

## 2022-01-27 PROCEDURE — 83036 POCT HEMOGLOBIN A1C: ICD-10-PCS | Mod: QW,,, | Performed by: NURSE PRACTITIONER

## 2022-01-27 PROCEDURE — 83036 HEMOGLOBIN GLYCOSYLATED A1C: CPT | Mod: QW,,, | Performed by: NURSE PRACTITIONER

## 2022-01-27 PROCEDURE — 99214 PR OFFICE/OUTPT VISIT, EST, LEVL IV, 30-39 MIN: ICD-10-PCS | Mod: S$GLB,,, | Performed by: NURSE PRACTITIONER

## 2022-01-27 RX ORDER — ALBUTEROL SULFATE 90 UG/1
2 AEROSOL, METERED RESPIRATORY (INHALATION) EVERY 6 HOURS PRN
Qty: 36 G | Refills: 3 | Status: SHIPPED | OUTPATIENT
Start: 2022-01-27

## 2022-01-27 RX ORDER — METOPROLOL SUCCINATE 50 MG/1
25 TABLET, EXTENDED RELEASE ORAL DAILY
Qty: 45 TABLET | Refills: 1 | Status: ON HOLD | OUTPATIENT
Start: 2022-01-27 | End: 2022-02-21 | Stop reason: SDUPTHER

## 2022-01-27 RX ORDER — FUROSEMIDE 20 MG/1
20 TABLET ORAL EVERY OTHER DAY
Qty: 45 TABLET | Refills: 1 | Status: ON HOLD | OUTPATIENT
Start: 2022-01-27 | End: 2022-02-21 | Stop reason: SDUPTHER

## 2022-01-27 RX ORDER — TEMAZEPAM 15 MG/1
15 CAPSULE ORAL NIGHTLY
Qty: 90 CAPSULE | Refills: 1 | Status: ON HOLD | OUTPATIENT
Start: 2022-01-27 | End: 2022-03-18 | Stop reason: HOSPADM

## 2022-01-27 NOTE — PROGRESS NOTES
SUBJECTIVE:    Patient ID: Marisol Kerr is a 76 y.o. female.    Chief Complaint: Follow-up (Brought bottles, vaccine denied, mammo to scheduled, dexa questions//dp)    Pt here for regular f/u- COPD/leg swelling/HTN- last seen in the office in July 2021. Pt here with her daughter  Pt had been having HH coming out wrapping her legs which helped a lot with swelling and venous ulcer/dermatitis has healed- HH has now discharged her. Pt reports she's unable to put on her compression socks on her own so she isn't wearing any compression daily- Doesn't have someone to help her apply stockings every day.   Had f/u with pulmonary and Dr. Cassidy marquez meds adjusted for bradycardia and he discussed angiogram if her renal fxn improved. Had recent labs which did show improvement in GFR up to 40 from 21. Pt reports has been taking lasix QOD      Office Visit on 01/27/2022   Component Date Value Ref Range Status    Hemoglobin A1C 01/27/2022 5.2  % Final   Lab Visit on 01/06/2022   Component Date Value Ref Range Status    Sodium 01/06/2022 140  136 - 145 mmol/L Final    Potassium 01/06/2022 5.2* 3.5 - 5.1 mmol/L Final    Chloride 01/06/2022 103  95 - 110 mmol/L Final    CO2 01/06/2022 28  23 - 29 mmol/L Final    Glucose 01/06/2022 99  70 - 110 mg/dL Final    BUN 01/06/2022 23  8 - 23 mg/dL Final    Creatinine 01/06/2022 1.3  0.5 - 1.4 mg/dL Final    Calcium 01/06/2022 9.5  8.7 - 10.5 mg/dL Final    Anion Gap 01/06/2022 9  8 - 16 mmol/L Final    eGFR if  01/06/2022 46.7* >60 mL/min/1.73 m^2 Final    eGFR if non African American 01/06/2022 40.5* >60 mL/min/1.73 m^2 Final    WBC 01/06/2022 6.52  3.90 - 12.70 K/uL Final    RBC 01/06/2022 4.44  4.00 - 5.40 M/uL Final    Hemoglobin 01/06/2022 12.5  12.0 - 16.0 g/dL Final    Hematocrit 01/06/2022 41.2  37.0 - 48.5 % Final    MCV 01/06/2022 93  82 - 98 fL Final    MCH 01/06/2022 28.2  27.0 - 31.0 pg Final    MCHC 01/06/2022 30.3* 32.0 - 36.0 g/dL  Final    RDW 01/06/2022 12.7  11.5 - 14.5 % Final    Platelets 01/06/2022 163  150 - 450 K/uL Final    MPV 01/06/2022 11.4  9.2 - 12.9 fL Final   Lab Visit on 10/29/2021   Component Date Value Ref Range Status    Sodium 10/29/2021 146* 136 - 145 mmol/L Final    Potassium 10/29/2021 4.6  3.5 - 5.1 mmol/L Final    Chloride 10/29/2021 104  95 - 110 mmol/L Final    CO2 10/29/2021 31* 23 - 29 mmol/L Final    Glucose 10/29/2021 103  70 - 110 mg/dL Final    BUN 10/29/2021 52* 8 - 23 mg/dL Final    Creatinine 10/29/2021 2.2* 0.5 - 1.4 mg/dL Final    Calcium 10/29/2021 9.6  8.7 - 10.5 mg/dL Final    Anion Gap 10/29/2021 11  8 - 16 mmol/L Final    eGFR if  10/29/2021 24.7* >60 mL/min/1.73 m^2 Final    eGFR if non  10/29/2021 21.4* >60 mL/min/1.73 m^2 Final   Lab Visit on 09/24/2021   Component Date Value Ref Range Status    Specimen UA 09/24/2021 Urine, Clean Catch   Final    Color, UA 09/24/2021 Yellow  Yellow, Straw, [place] Final    Appearance, UA 09/24/2021 Clear  Clear Final    pH, UA 09/24/2021 7.0  5.0 - 8.0 Final    Specific Gravity, UA 09/24/2021 1.015  1.005 - 1.030 Final    Protein, UA 09/24/2021 Negative  Negative Final    Glucose, UA 09/24/2021 Negative  Negative Final    Ketones, UA 09/24/2021 Negative  Negative Final    Bilirubin (UA) 09/24/2021 Negative  Negative Final    Occult Blood UA 09/24/2021 Negative  Negative Final    Nitrite, UA 09/24/2021 Negative  Negative Final    Urobilinogen, UA 09/24/2021 Negative  Negative EU/dL Final    Leukocytes, UA 09/24/2021 Negative  Negative Final    Microalbumin, Urine 09/24/2021 33.7* <19.9 ug/mL Final    Creatinine, Urine 09/24/2021 61.0  15.0 - 325.0 mg/dL Final    Microalb/Creat Ratio 09/24/2021 55.2* 0.0 - 30.0 ug/mg Final    Urine Culture, Routine 09/24/2021 *  Final                    Value:ESCHERICHIA COLI  >100,000 cfu/ml     Lab Visit on 09/21/2021   Component Date Value Ref Range Status    Sodium  09/21/2021 141  136 - 145 mmol/L Final    Potassium 09/21/2021 5.6* 3.5 - 5.1 mmol/L Final    Chloride 09/21/2021 105  95 - 110 mmol/L Final    CO2 09/21/2021 28  23 - 29 mmol/L Final    Glucose 09/21/2021 109  70 - 110 mg/dL Final    BUN 09/21/2021 38* 8 - 23 mg/dL Final    Creatinine 09/21/2021 1.8* 0.5 - 1.4 mg/dL Final    Calcium 09/21/2021 9.5  8.7 - 10.5 mg/dL Final    Total Protein 09/21/2021 7.2  6.0 - 8.4 g/dL Final    Albumin 09/21/2021 4.1  3.5 - 5.2 g/dL Final    Total Bilirubin 09/21/2021 0.7  0.1 - 1.0 mg/dL Final    Alkaline Phosphatase 09/21/2021 72  55 - 135 U/L Final    AST 09/21/2021 18  10 - 40 U/L Final    ALT 09/21/2021 16  10 - 44 U/L Final    Anion Gap 09/21/2021 8  8 - 16 mmol/L Final    eGFR if  09/21/2021 31.5* >60 mL/min/1.73 m^2 Final    eGFR if non  09/21/2021 27.3* >60 mL/min/1.73 m^2 Final    WBC 09/21/2021 6.12  3.90 - 12.70 K/uL Final    RBC 09/21/2021 4.37  4.00 - 5.40 M/uL Final    Hemoglobin 09/21/2021 12.3  12.0 - 16.0 g/dL Final    Hematocrit 09/21/2021 41.3  37.0 - 48.5 % Final    MCV 09/21/2021 95  82 - 98 fL Final    MCH 09/21/2021 28.1  27.0 - 31.0 pg Final    MCHC 09/21/2021 29.8* 32.0 - 36.0 g/dL Final    RDW 09/21/2021 13.2  11.5 - 14.5 % Final    Platelets 09/21/2021 174  150 - 450 K/uL Final    MPV 09/21/2021 11.9  9.2 - 12.9 fL Final    Immature Granulocytes 09/21/2021 0.3  0.0 - 0.5 % Final    Gran # (ANC) 09/21/2021 4.4  1.8 - 7.7 K/uL Final    Immature Grans (Abs) 09/21/2021 0.02  0.00 - 0.04 K/uL Final    Lymph # 09/21/2021 1.2  1.0 - 4.8 K/uL Final    Mono # 09/21/2021 0.4  0.3 - 1.0 K/uL Final    Eos # 09/21/2021 0.1  0.0 - 0.5 K/uL Final    Baso # 09/21/2021 0.04  0.00 - 0.20 K/uL Final    nRBC 09/21/2021 0  0 /100 WBC Final    Gran % 09/21/2021 71.1  38.0 - 73.0 % Final    Lymph % 09/21/2021 19.9  18.0 - 48.0 % Final    Mono % 09/21/2021 6.5  4.0 - 15.0 % Final    Eosinophil % 09/21/2021 1.5   0.0 - 8.0 % Final    Basophil % 09/21/2021 0.7  0.0 - 1.9 % Final    Differential Method 09/21/2021 Automated   Final    Cholesterol 09/21/2021 165  120 - 199 mg/dL Final    Triglycerides 09/21/2021 69  30 - 150 mg/dL Final    HDL 09/21/2021 53  40 - 75 mg/dL Final    LDL Cholesterol 09/21/2021 98.2  63.0 - 159.0 mg/dL Final    HDL/Cholesterol Ratio 09/21/2021 32.1  20.0 - 50.0 % Final    Total Cholesterol/HDL Ratio 09/21/2021 3.1  2.0 - 5.0 Final    Non-HDL Cholesterol 09/21/2021 112  mg/dL Final    TSH 09/21/2021 0.340  0.340 - 5.600 uIU/mL Final       Past Medical History:   Diagnosis Date    CAD (coronary artery disease)     Cancer     colon    CHF (congestive heart failure)     Colitis     Colon cancer     COPD (chronic obstructive pulmonary disease)     Decreased hearing     Diabetes mellitus     Diabetes mellitus, type 2     Hypercholesterolemia     Hypertension     Hypoxemia     Insomnia     Obesity     Osteoarthritis      Past Surgical History:   Procedure Laterality Date    CARDIAC CATHETERIZATION      CHOLECYSTECTOMY      COLECTOMY      CORONARY ANGIOPLASTY      CORONARY STENT PLACEMENT      EXTERNAL EAR SURGERY      EYE SURGERY      FLEXIBLE SIGMOIDOSCOPY N/A 9/19/2019    Procedure: SIGMOIDOSCOPY, FLEXIBLE;  Surgeon: Biju Kramer III, MD;  Location: [facility];  Service: Endoscopy;  Laterality: N/A;    HYSTERECTOMY      partial     Family History   Problem Relation Age of Onset    Febrile seizures Mother     Heart failure Father        The CVD Risk score (LILIA'Agostino et al., 2008) failed to calculate for the following reasons:    The patient has a prior MI, stroke, CHF, or peripheral vascular disease diagnosis     Marital Status:   Alcohol History:  reports current alcohol use.  Tobacco History:  reports that she quit smoking about 15 years ago. Her smoking use included cigarettes. She started smoking about 57 years ago. She has a 150.00 pack-year  smoking history. She has never used smokeless tobacco.  Drug History:  reports no history of drug use.    Health Maintenance Topics with due status: Not Due       Topic Last Completion Date    Colonoscopy 09/19/2019    TETANUS VACCINE 12/07/2020    Foot Exam 07/07/2021    Lipid Panel 09/21/2021    Diabetes Urine Screening 09/24/2021    Eye Exam 10/05/2021    High Dose Statin 01/27/2022    Aspirin/Antiplatelet Therapy 01/27/2022    Hemoglobin A1c 01/27/2022     Immunization History   Administered Date(s) Administered    COVID-19, MRNA, LN-S, PF (MODERNA FULL 0.5 ML DOSE) 01/13/2021, 02/10/2021, 10/28/2021    Influenza 01/01/2013    Influenza - High Dose - PF (65 years and older) 10/08/2014, 09/29/2016, 09/26/2019    Influenza - Quadrivalent - High Dose - PF (65 years and older) 09/17/2020    Influenza - Trivalent (ADULT) 01/02/2012    Influenza Split 01/02/2012    Pneumococcal 01/01/2010    Pneumococcal Conjugate - 13 Valent 11/11/2015    Pneumococcal Polysaccharide - 23 Valent 01/27/2017    Td (Adult), Unspecified Formulation 05/10/2019    Tdap 12/07/2020    Zoster 06/04/2015       Review of patient's allergies indicates:   Allergen Reactions    Iodinated contrast media     Strawberries [strawberry] Hives and Itching       Current Outpatient Medications:     acetaminophen (TYLENOL) 500 MG tablet, Take 500 mg by mouth every 6 (six) hours as needed for Pain. , Disp: , Rfl:     amLODIPine (NORVASC) 2.5 MG tablet, Take 1 tablet (2.5 mg total) by mouth once daily., Disp: 30 tablet, Rfl: 11    aspirin (ECOTRIN) 81 MG EC tablet, Take 81 mg by mouth every morning. , Disp: , Rfl:     benazepriL (LOTENSIN) 40 MG tablet, Take 1 tablet (40 mg total) by mouth once daily., Disp: 90 tablet, Rfl: 1    bromfenac (PROLENSA) 0.07 % Drop, Place 1 drop into both eyes daily as needed., Disp: , Rfl:     coenzyme Q10 100 mg capsule, Take 100 mg by mouth every morning., Disp: , Rfl:     COVID-19 vacc,mRNA,Moderna,/PF  (MODERNA COVID-19 VACCINE, EUA, IM), ADMINISTER 0.5ML IN THE MUSCLE AS DIRECTED, Disp: , Rfl:     dextran 70-hypromellose 0.1-0.3 % Drop, Place 1 drop into both eyes daily as needed., Disp: , Rfl:     ergocalciferol (VITAMIN D2) 50,000 unit Cap, Take 1 capsule (50,000 Units total) by mouth every 7 days., Disp: 12 capsule, Rfl: 1    fish oil-omega-3 fatty acids 300-1,000 mg capsule, Take 1 capsule by mouth every morning., Disp: , Rfl:     FLAXSEED OIL ORAL, Take 1,200 mg by mouth every morning., Disp: , Rfl:     fluticasone-salmeterol diskus inhaler 250-50 mcg, Inhale 1 puff into the lungs 2 (two) times daily., Disp: 3 each, Rfl: 1    ipratropium-albuteroL (COMBIVENT RESPIMAT)  mcg/actuation inhaler, Inhale 2 puffs into the lungs every 4 (four) hours as needed for Shortness of Breath. Rescue, Disp: 12 g, Rfl: 3    lovastatin (MEVACOR) 40 MG tablet, Take 0.5 tablets (20 mg total) by mouth every evening. (Patient taking differently: Take 40 mg by mouth every other day.), Disp: 45 tablet, Rfl: 3    meloxicam (MOBIC) 7.5 MG tablet, Take 1 tablet (7.5 mg total) by mouth once daily., Disp: 90 tablet, Rfl: 1    NARCAN 4 mg/actuation Spry, , Disp: , Rfl:     nitroGLYCERIN 0.2 mg/hr TD PT24 (NITRODUR) 0.2 mg/hr, APPLY 1 PATCH TOPICALLY ONCE DAILY TO LEG., Disp: 90 patch, Rfl: 1    nitroGLYCERIN 0.4 MG/DOSE TL SPRY (NITROLINGUAL) 400 mcg/spray spray, USE ONE SPRAY UNDER THE TONGUE EVERY 5 MINUTES AS NEEDED FOR CHEST PAIN.  DO NOT EXCEED A TOTAL OF 3 SPRAYS IN 15 MINUTES, Disp: 12 g, Rfl: 0    ondansetron (ZOFRAN-ODT) 4 MG TbDL, Take 2 tablets (8 mg total) by mouth every 6 (six) hours as needed., Disp: 100 tablet, Rfl: 3    pantoprazole (PROTONIX) 40 MG tablet, Take 1 tablet (40 mg total) by mouth once daily., Disp: 30 tablet, Rfl: 11    PNV NO.115/IRON FUMARATE/FA (PRENATAL #115-IRON-FOLIC ACID ORAL), Take 1 tablet by mouth every morning. , Disp: , Rfl:     spironolactone (ALDACTONE) 25 MG tablet, Take 1  tablet (25 mg total) by mouth once daily. (Patient taking differently: Take 25 mg by mouth every other day.), Disp: 90 tablet, Rfl: 1    triamcinolone acetonide 0.1% (KENALOG) 0.1 % cream, Apply topically 2 (two) times daily. For legs., Disp: 453 g, Rfl: 1    umeclidinium (INCRUSE ELLIPTA) 62.5 mcg/actuation inhalation capsule, Inhale 1 capsule (63 mcg total) into the lungs every morning. Controller, Disp: 30 each, Rfl: 11    vit C/E/Zn/coppr/lutein/zeaxan (PRESERVISION AREDS-2 ORAL), Take 1 capsule by mouth every morning., Disp: , Rfl:     albuterol (VENTOLIN HFA) 90 mcg/actuation inhaler, Inhale 2 puffs into the lungs every 6 (six) hours as needed for Wheezing or Shortness of Breath. Rescue, Disp: 36 g, Rfl: 3    furosemide (LASIX) 20 MG tablet, Take 1 tablet (20 mg total) by mouth every other day., Disp: 45 tablet, Rfl: 1    metoprolol succinate (TOPROL-XL) 50 MG 24 hr tablet, Take 0.5 tablets (25 mg total) by mouth once daily., Disp: 45 tablet, Rfl: 1    temazepam (RESTORIL) 15 mg Cap, Take 1 capsule (15 mg total) by mouth every evening., Disp: 90 capsule, Rfl: 1    Current Facility-Administered Medications:     furosemide tablet 40 mg, 40 mg, Oral, 1 time in Clinic/HOD, Cristian Benjamin MD    Review of Systems   Constitutional: Positive for unexpected weight change (wt up 20lbs over past year). Negative for chills and fever.   HENT: Negative for trouble swallowing.    Respiratory: Positive for cough (mild, chronic) and shortness of breath (stable). Negative for wheezing.    Cardiovascular: Positive for leg swelling. Negative for chest pain and palpitations.   Gastrointestinal: Negative for abdominal pain, constipation, diarrhea, nausea and vomiting.   Genitourinary: Positive for frequency. Negative for dysuria and hematuria.   Musculoskeletal: Positive for arthralgias (bilat hip pain) and back pain.   Skin: Negative for rash and wound.   Neurological: Negative for dizziness, syncope and headaches.  "         Objective:      Vitals:    01/27/22 1110   BP: 130/68   Pulse: 76   SpO2: 98%   Weight: 89.6 kg (197 lb 9.6 oz)   Height: 5' 1" (1.549 m)     Physical Exam  Constitutional:       General: She is not in acute distress.     Appearance: She is obese.      Comments: Chronically ill Elderly WF sitting in WC, on O2 NC   HENT:      Head: Normocephalic and atraumatic.      Right Ear: Tympanic membrane and ear canal normal.      Left Ear: Tympanic membrane and ear canal normal.   Neck:      Vascular: No carotid bruit.   Cardiovascular:      Rate and Rhythm: Normal rate and regular rhythm.      Heart sounds: No murmur heard.      Pulmonary:      Effort: Pulmonary effort is normal. No respiratory distress.      Breath sounds: No wheezing or rales.      Comments: Breath sounds diminished throughout  Abdominal:      Palpations: Abdomen is soft.      Tenderness: There is no abdominal tenderness.   Genitourinary:     Comments: Rectal exam returned small amount of maroon stool/mucus; no hemorrhoids; chronic excoriation to sacrum  Musculoskeletal:      Cervical back: Neck supple.      Right lower leg: Edema (2+ pitting edema bilateral calves from proximal calves to feet.  no erythema/drainage/ulcers) present.      Left lower leg: Edema present.   Feet:      Right foot:      Skin integrity: Callus and dry skin present. No ulcer or erythema.      Left foot:      Skin integrity: Callus and dry skin present. No ulcer or erythema.   Lymphadenopathy:      Cervical: No cervical adenopathy.   Skin:     General: Skin is warm and dry.   Neurological:      General: No focal deficit present.      Mental Status: She is alert and oriented to person, place, and time.           Assessment:       1. Panlobular emphysema    2. Essential hypertension, benign    3. Coronary artery disease of native artery of native heart with stable angina pectoris    4. Venous insufficiency of both lower extremities    5. Stage 3b chronic kidney disease    6. " Pulmonary nodule    7. Chronic hypoxemic respiratory failure    8. DM type 2, controlled, with complication    9. Primary insomnia    10. Screening for osteoporosis    11. Menopause    12. Screening mammogram, encounter for           Plan:       Panlobular emphysema  Comments:  Stable, f/u with pulm as scheduled  Orders:  -     albuterol (VENTOLIN HFA) 90 mcg/actuation inhaler; Inhale 2 puffs into the lungs every 6 (six) hours as needed for Wheezing or Shortness of Breath. Rescue  Dispense: 36 g; Refill: 3    Essential hypertension, benign  Comments:  BP well controlled  Orders:  -     metoprolol succinate (TOPROL-XL) 50 MG 24 hr tablet; Take 0.5 tablets (25 mg total) by mouth once daily.  Dispense: 45 tablet; Refill: 1  -     furosemide (LASIX) 20 MG tablet; Take 1 tablet (20 mg total) by mouth every other day.  Dispense: 45 tablet; Refill: 1    Coronary artery disease of native artery of native heart with stable angina pectoris  Comments:  pt reports some intermittent CP episodes- has discussed with Dr. Benjamin and he is considering angiogram- f/u with cards as scheduled    Venous insufficiency of both lower extremities  Comments:  improved with HH however pt/daughter advised unless she uses compression regularly she will again develop worsening swelling and ulcers    Stage 3b chronic kidney disease  Comments:  reviewed recent labs with pt, renal fxn has improved- continue with lasix QOD    Pulmonary nodule  Comments:  followed by pulmonary    Chronic hypoxemic respiratory failure  Comments:  stable on continous O2 2lnc    DM type 2, controlled, with complication  Comments:  stable, A1C in normal range off meds  Orders:  -     Hemoglobin A1C, POCT    Primary insomnia  -     temazepam (RESTORIL) 15 mg Cap; Take 1 capsule (15 mg total) by mouth every evening.  Dispense: 90 capsule; Refill: 1    Screening for osteoporosis  -     DXA Bone Density Spine And Hip; Future; Expected date: 02/03/2022    Menopause  -      DXA Bone Density Spine And Hip; Future; Expected date: 02/03/2022    Screening mammogram, encounter for  -     Mammo Digital Screening Bilat; Future; Expected date: 02/03/2022      Follow up in about 3 months (around 4/27/2022) for venous stasis, COPD, HTN.          Counseled on age and gender appropriate medical preventative services, including cancer screenings, immunizations, overall nutritional health, need for a consistent exercise regimen and an overall push towards maintaining a vigorous and active lifestyle.      1/31/2022 Antonieta Marquez NP

## 2022-01-28 NOTE — ASSESSMENT & PLAN NOTE
CAD with multiple percutaneous revascularization the last 1 performed on 01/23/2015.  She is having symptoms of angina.  She is having chronic kidney disease.  Will recheck her kidneys and consider a coronary arteriogram.

## 2022-02-09 ENCOUNTER — TELEPHONE (OUTPATIENT)
Dept: CARDIOLOGY | Facility: CLINIC | Age: 77
End: 2022-02-09
Payer: MEDICARE

## 2022-02-09 DIAGNOSIS — I20.0 UNSTABLE ANGINA: Primary | ICD-10-CM

## 2022-02-09 DIAGNOSIS — I25.10 CORONARY ARTERY DISEASE, UNSPECIFIED VESSEL OR LESION TYPE, UNSPECIFIED WHETHER ANGINA PRESENT, UNSPECIFIED WHETHER NATIVE OR TRANSPLANTED HEART: ICD-10-CM

## 2022-02-09 DIAGNOSIS — Z01.818 PRE-OP TESTING: ICD-10-CM

## 2022-02-09 RX ORDER — PREDNISONE 20 MG/1
20 TABLET ORAL SEE ADMIN INSTRUCTIONS
Qty: 6 TABLET | Refills: 0 | Status: ON HOLD | OUTPATIENT
Start: 2022-02-09 | End: 2022-02-21 | Stop reason: HOSPADM

## 2022-02-09 RX ORDER — DIPHENHYDRAMINE HCL 25 MG
50 CAPSULE ORAL
Status: CANCELLED | OUTPATIENT
Start: 2022-02-09

## 2022-02-09 NOTE — TELEPHONE ENCOUNTER
S/w pt's daughter & advised that [facility] tomorrow at 730am. To get there around 530. ( they may call with time) advised pt needs prednisone & rx sent into pharmacy

## 2022-02-10 ENCOUNTER — HOSPITAL ENCOUNTER (INPATIENT)
Facility: HOSPITAL | Age: 77
LOS: 1 days | Discharge: ANOTHER HOSPITAL | DRG: 281 | End: 2022-02-11
Attending: INTERNAL MEDICINE | Admitting: INTERNAL MEDICINE
Payer: MEDICARE

## 2022-02-10 DIAGNOSIS — J96.20 ACUTE ON CHRONIC RESPIRATORY FAILURE: ICD-10-CM

## 2022-02-10 DIAGNOSIS — I25.10 CORONARY ARTERY DISEASE INVOLVING NATIVE CORONARY ARTERY WITHOUT ANGINA PECTORIS, UNSPECIFIED WHETHER NATIVE OR TRANSPLANTED HEART: Primary | ICD-10-CM

## 2022-02-10 DIAGNOSIS — R07.9 CHEST PAIN: ICD-10-CM

## 2022-02-10 DIAGNOSIS — I25.10 CAD (CORONARY ARTERY DISEASE): ICD-10-CM

## 2022-02-10 DIAGNOSIS — I10 PRIMARY HYPERTENSION: ICD-10-CM

## 2022-02-10 DIAGNOSIS — I25.10 CORONARY ARTERY DISEASE, UNSPECIFIED VESSEL OR LESION TYPE, UNSPECIFIED WHETHER ANGINA PRESENT, UNSPECIFIED WHETHER NATIVE OR TRANSPLANTED HEART: ICD-10-CM

## 2022-02-10 DIAGNOSIS — I20.0 UNSTABLE ANGINA: ICD-10-CM

## 2022-02-10 DIAGNOSIS — J96.20 ACUTE ON CHRONIC RESPIRATORY FAILURE, UNSPECIFIED WHETHER WITH HYPOXIA OR HYPERCAPNIA: ICD-10-CM

## 2022-02-10 PROBLEM — I16.0 HYPERTENSIVE URGENCY: Status: ACTIVE | Noted: 2022-02-10

## 2022-02-10 PROBLEM — H91.90 HARD OF HEARING: Chronic | Status: ACTIVE | Noted: 2022-02-10

## 2022-02-10 PROBLEM — I87.8 VENOUS STASIS: Chronic | Status: ACTIVE | Noted: 2022-02-10

## 2022-02-10 PROBLEM — J44.9 COPD (CHRONIC OBSTRUCTIVE PULMONARY DISEASE): Chronic | Status: ACTIVE | Noted: 2019-01-16

## 2022-02-10 PROBLEM — E87.5 HYPERKALEMIA: Status: ACTIVE | Noted: 2022-02-10

## 2022-02-10 PROBLEM — T78.40XA ALLERGIC REACTION: Status: ACTIVE | Noted: 2022-02-10

## 2022-02-10 LAB
ALBUMIN SERPL BCP-MCNC: 3.6 G/DL (ref 3.5–5.2)
ALP SERPL-CCNC: 55 U/L (ref 55–135)
ALT SERPL W/O P-5'-P-CCNC: 15 U/L (ref 10–44)
ANION GAP SERPL CALC-SCNC: 11 MMOL/L (ref 8–16)
ANION GAP SERPL CALC-SCNC: 11 MMOL/L (ref 8–16)
APTT PPP: 25.5 SEC (ref 23.3–35.1)
AST SERPL-CCNC: 22 U/L (ref 10–40)
BASOPHILS # BLD AUTO: 0.02 K/UL (ref 0–0.2)
BASOPHILS NFR BLD: 0.3 % (ref 0–1.9)
BILIRUB SERPL-MCNC: 0.5 MG/DL (ref 0.1–1)
BNP SERPL-MCNC: 580 PG/ML (ref 0–99)
BUN SERPL-MCNC: 29 MG/DL (ref 8–23)
BUN SERPL-MCNC: 30 MG/DL (ref 8–23)
CALCIUM SERPL-MCNC: 9.3 MG/DL (ref 8.7–10.5)
CALCIUM SERPL-MCNC: 9.4 MG/DL (ref 8.7–10.5)
CHLORIDE SERPL-SCNC: 101 MMOL/L (ref 95–110)
CHLORIDE SERPL-SCNC: 102 MMOL/L (ref 95–110)
CO2 SERPL-SCNC: 28 MMOL/L (ref 23–29)
CO2 SERPL-SCNC: 29 MMOL/L (ref 23–29)
CREAT SERPL-MCNC: 1.4 MG/DL (ref 0.5–1.4)
CREAT SERPL-MCNC: 1.6 MG/DL (ref 0.5–1.4)
DIFFERENTIAL METHOD: ABNORMAL
EOSINOPHIL # BLD AUTO: 0 K/UL (ref 0–0.5)
EOSINOPHIL NFR BLD: 0 % (ref 0–8)
ERYTHROCYTE [DISTWIDTH] IN BLOOD BY AUTOMATED COUNT: 12.8 % (ref 11.5–14.5)
EST. GFR  (AFRICAN AMERICAN): 36.3 ML/MIN/1.73 M^2
EST. GFR  (AFRICAN AMERICAN): 42.7 ML/MIN/1.73 M^2
EST. GFR  (NON AFRICAN AMERICAN): 31.5 ML/MIN/1.73 M^2
EST. GFR  (NON AFRICAN AMERICAN): 37 ML/MIN/1.73 M^2
GLUCOSE SERPL-MCNC: 135 MG/DL (ref 70–110)
GLUCOSE SERPL-MCNC: 151 MG/DL (ref 70–110)
GLUCOSE SERPL-MCNC: 160 MG/DL (ref 70–110)
HCT VFR BLD AUTO: 43.8 % (ref 37–48.5)
HGB BLD-MCNC: 13.2 G/DL (ref 12–16)
IMM GRANULOCYTES # BLD AUTO: 0.03 K/UL (ref 0–0.04)
IMM GRANULOCYTES NFR BLD AUTO: 0.4 % (ref 0–0.5)
INR PPP: 1
LYMPHOCYTES # BLD AUTO: 0.3 K/UL (ref 1–4.8)
LYMPHOCYTES NFR BLD: 4.6 % (ref 18–48)
MCH RBC QN AUTO: 28.4 PG (ref 27–31)
MCHC RBC AUTO-ENTMCNC: 30.1 G/DL (ref 32–36)
MCV RBC AUTO: 94 FL (ref 82–98)
MONOCYTES # BLD AUTO: 0.1 K/UL (ref 0.3–1)
MONOCYTES NFR BLD: 1 % (ref 4–15)
NEUTROPHILS # BLD AUTO: 6.2 K/UL (ref 1.8–7.7)
NEUTROPHILS NFR BLD: 93.7 % (ref 38–73)
NRBC BLD-RTO: 0 /100 WBC
PLATELET # BLD AUTO: 167 K/UL (ref 150–450)
PMV BLD AUTO: 11.9 FL (ref 9.2–12.9)
POTASSIUM SERPL-SCNC: 4.9 MMOL/L (ref 3.5–5.1)
POTASSIUM SERPL-SCNC: 5.2 MMOL/L (ref 3.5–5.1)
PROT SERPL-MCNC: 6.6 G/DL (ref 6–8.4)
PROTHROMBIN TIME: 12.1 SEC (ref 11.4–13.7)
RBC # BLD AUTO: 4.64 M/UL (ref 4–5.4)
SARS-COV-2 RDRP RESP QL NAA+PROBE: NEGATIVE
SODIUM SERPL-SCNC: 141 MMOL/L (ref 136–145)
SODIUM SERPL-SCNC: 141 MMOL/L (ref 136–145)
TROPONIN I SERPL DL<=0.01 NG/ML-MCNC: 0.28 NG/ML
WBC # BLD AUTO: 6.67 K/UL (ref 3.9–12.7)

## 2022-02-10 PROCEDURE — 99152 MOD SED SAME PHYS/QHP 5/>YRS: CPT | Performed by: INTERNAL MEDICINE

## 2022-02-10 PROCEDURE — 93454 CORONARY ARTERY ANGIO S&I: CPT | Mod: 26,,, | Performed by: INTERNAL MEDICINE

## 2022-02-10 PROCEDURE — C1894 INTRO/SHEATH, NON-LASER: HCPCS | Performed by: INTERNAL MEDICINE

## 2022-02-10 PROCEDURE — 80053 COMPREHEN METABOLIC PANEL: CPT | Performed by: INTERNAL MEDICINE

## 2022-02-10 PROCEDURE — 85730 THROMBOPLASTIN TIME PARTIAL: CPT | Performed by: INTERNAL MEDICINE

## 2022-02-10 PROCEDURE — 85025 COMPLETE CBC W/AUTO DIFF WBC: CPT | Performed by: INTERNAL MEDICINE

## 2022-02-10 PROCEDURE — 63600175 PHARM REV CODE 636 W HCPCS: Performed by: INTERNAL MEDICINE

## 2022-02-10 PROCEDURE — C1769 GUIDE WIRE: HCPCS | Performed by: INTERNAL MEDICINE

## 2022-02-10 PROCEDURE — 99900035 HC TECH TIME PER 15 MIN (STAT)

## 2022-02-10 PROCEDURE — 25000242 PHARM REV CODE 250 ALT 637 W/ HCPCS: Performed by: INTERNAL MEDICINE

## 2022-02-10 PROCEDURE — 94761 N-INVAS EAR/PLS OXIMETRY MLT: CPT

## 2022-02-10 PROCEDURE — 25000003 PHARM REV CODE 250: Performed by: INTERNAL MEDICINE

## 2022-02-10 PROCEDURE — 80048 BASIC METABOLIC PNL TOTAL CA: CPT | Performed by: INTERNAL MEDICINE

## 2022-02-10 PROCEDURE — U0002 COVID-19 LAB TEST NON-CDC: HCPCS | Performed by: INTERNAL MEDICINE

## 2022-02-10 PROCEDURE — 99223 1ST HOSP IP/OBS HIGH 75: CPT | Mod: ,,, | Performed by: INTERNAL MEDICINE

## 2022-02-10 PROCEDURE — C1887 CATHETER, GUIDING: HCPCS | Performed by: INTERNAL MEDICINE

## 2022-02-10 PROCEDURE — 85610 PROTHROMBIN TIME: CPT | Performed by: INTERNAL MEDICINE

## 2022-02-10 PROCEDURE — 94640 AIRWAY INHALATION TREATMENT: CPT

## 2022-02-10 PROCEDURE — 99153 MOD SED SAME PHYS/QHP EA: CPT | Performed by: INTERNAL MEDICINE

## 2022-02-10 PROCEDURE — 94799 UNLISTED PULMONARY SVC/PX: CPT

## 2022-02-10 PROCEDURE — 93454 CORONARY ARTERY ANGIO S&I: CPT | Performed by: INTERNAL MEDICINE

## 2022-02-10 PROCEDURE — 93010 EKG 12-LEAD: ICD-10-PCS | Mod: ,,, | Performed by: SPECIALIST

## 2022-02-10 PROCEDURE — 99900031 HC PATIENT EDUCATION (STAT)

## 2022-02-10 PROCEDURE — 99152 MOD SED SAME PHYS/QHP 5/>YRS: CPT | Mod: ,,, | Performed by: INTERNAL MEDICINE

## 2022-02-10 PROCEDURE — 93005 ELECTROCARDIOGRAM TRACING: CPT | Performed by: SPECIALIST

## 2022-02-10 PROCEDURE — 20000000 HC ICU ROOM

## 2022-02-10 PROCEDURE — 93010 ELECTROCARDIOGRAM REPORT: CPT | Mod: ,,, | Performed by: SPECIALIST

## 2022-02-10 PROCEDURE — 99152 PR MOD CONSCIOUS SEDATION, SAME PHYS, 5+ YRS, FIRST 15 MIN: ICD-10-PCS | Mod: ,,, | Performed by: INTERNAL MEDICINE

## 2022-02-10 PROCEDURE — 93454 PR CATH PLACE/CORONARY ANGIO, IMG SUPER/INTERP: ICD-10-PCS | Mod: 26,,, | Performed by: INTERNAL MEDICINE

## 2022-02-10 PROCEDURE — 36415 COLL VENOUS BLD VENIPUNCTURE: CPT | Performed by: INTERNAL MEDICINE

## 2022-02-10 PROCEDURE — 84484 ASSAY OF TROPONIN QUANT: CPT | Performed by: INTERNAL MEDICINE

## 2022-02-10 PROCEDURE — 83880 ASSAY OF NATRIURETIC PEPTIDE: CPT | Performed by: INTERNAL MEDICINE

## 2022-02-10 PROCEDURE — 99223 PR INITIAL HOSPITAL CARE,LEVL III: ICD-10-PCS | Mod: ,,, | Performed by: INTERNAL MEDICINE

## 2022-02-10 RX ORDER — SODIUM CHLORIDE 450 MG/100ML
INJECTION, SOLUTION INTRAVENOUS CONTINUOUS
Status: DISCONTINUED | OUTPATIENT
Start: 2022-02-10 | End: 2022-02-11

## 2022-02-10 RX ORDER — LIDOCAINE HYDROCHLORIDE 10 MG/ML
INJECTION, SOLUTION EPIDURAL; INFILTRATION; INTRACAUDAL; PERINEURAL
Status: DISCONTINUED | OUTPATIENT
Start: 2022-02-10 | End: 2022-02-10 | Stop reason: HOSPADM

## 2022-02-10 RX ORDER — ACETYLCYSTEINE 200 MG/ML
1200 SOLUTION ORAL; RESPIRATORY (INHALATION) ONCE
Status: COMPLETED | OUTPATIENT
Start: 2022-02-10 | End: 2022-02-10

## 2022-02-10 RX ORDER — ENOXAPARIN SODIUM 100 MG/ML
40 INJECTION SUBCUTANEOUS EVERY 24 HOURS
Status: DISCONTINUED | OUTPATIENT
Start: 2022-02-10 | End: 2022-02-11

## 2022-02-10 RX ORDER — GLUCAGON 1 MG
1 KIT INJECTION
Status: DISCONTINUED | OUTPATIENT
Start: 2022-02-10 | End: 2022-02-11 | Stop reason: HOSPADM

## 2022-02-10 RX ORDER — AMLODIPINE BESYLATE 5 MG/1
5 TABLET ORAL ONCE
Status: COMPLETED | OUTPATIENT
Start: 2022-02-10 | End: 2022-02-10

## 2022-02-10 RX ORDER — LISINOPRIL 20 MG/1
40 TABLET ORAL DAILY
Status: DISCONTINUED | OUTPATIENT
Start: 2022-02-11 | End: 2022-02-11 | Stop reason: HOSPADM

## 2022-02-10 RX ORDER — ONDANSETRON 4 MG/1
8 TABLET, ORALLY DISINTEGRATING ORAL EVERY 8 HOURS PRN
Status: DISCONTINUED | OUTPATIENT
Start: 2022-02-10 | End: 2022-02-11 | Stop reason: HOSPADM

## 2022-02-10 RX ORDER — ONDANSETRON 2 MG/ML
4 INJECTION INTRAMUSCULAR; INTRAVENOUS EVERY 8 HOURS PRN
Status: DISCONTINUED | OUTPATIENT
Start: 2022-02-10 | End: 2022-02-11 | Stop reason: HOSPADM

## 2022-02-10 RX ORDER — DIPHENHYDRAMINE HCL 25 MG
50 CAPSULE ORAL
Status: DISCONTINUED | OUTPATIENT
Start: 2022-02-10 | End: 2022-02-10 | Stop reason: HOSPADM

## 2022-02-10 RX ORDER — DIPHENHYDRAMINE HYDROCHLORIDE 50 MG/ML
25 INJECTION INTRAMUSCULAR; INTRAVENOUS ONCE
Status: COMPLETED | OUTPATIENT
Start: 2022-02-10 | End: 2022-02-10

## 2022-02-10 RX ORDER — METOPROLOL SUCCINATE 25 MG/1
25 TABLET, EXTENDED RELEASE ORAL DAILY
Status: DISCONTINUED | OUTPATIENT
Start: 2022-02-11 | End: 2022-02-11

## 2022-02-10 RX ORDER — SODIUM CHLORIDE 450 MG/100ML
INJECTION, SOLUTION INTRAVENOUS CONTINUOUS
Status: ACTIVE | OUTPATIENT
Start: 2022-02-10 | End: 2022-02-10

## 2022-02-10 RX ORDER — MAG HYDROX/ALUMINUM HYD/SIMETH 200-200-20
30 SUSPENSION, ORAL (FINAL DOSE FORM) ORAL ONCE
Status: COMPLETED | OUTPATIENT
Start: 2022-02-10 | End: 2022-02-10

## 2022-02-10 RX ORDER — PANTOPRAZOLE SODIUM 40 MG/1
40 TABLET, DELAYED RELEASE ORAL DAILY
Status: DISCONTINUED | OUTPATIENT
Start: 2022-02-11 | End: 2022-02-11 | Stop reason: HOSPADM

## 2022-02-10 RX ORDER — TALC
6 POWDER (GRAM) TOPICAL NIGHTLY PRN
Status: DISCONTINUED | OUTPATIENT
Start: 2022-02-10 | End: 2022-02-11 | Stop reason: HOSPADM

## 2022-02-10 RX ORDER — ZOLPIDEM TARTRATE 5 MG/1
5 TABLET ORAL NIGHTLY PRN
Refills: 1 | Status: DISCONTINUED | OUTPATIENT
Start: 2022-02-10 | End: 2022-02-11 | Stop reason: HOSPADM

## 2022-02-10 RX ORDER — IBUPROFEN 200 MG
24 TABLET ORAL
Status: DISCONTINUED | OUTPATIENT
Start: 2022-02-10 | End: 2022-02-11 | Stop reason: HOSPADM

## 2022-02-10 RX ORDER — IPRATROPIUM BROMIDE AND ALBUTEROL SULFATE 2.5; .5 MG/3ML; MG/3ML
3 SOLUTION RESPIRATORY (INHALATION) EVERY 6 HOURS
Status: DISCONTINUED | OUTPATIENT
Start: 2022-02-10 | End: 2022-02-11

## 2022-02-10 RX ORDER — ACETAMINOPHEN 325 MG/1
650 TABLET ORAL EVERY 4 HOURS PRN
Status: DISCONTINUED | OUTPATIENT
Start: 2022-02-10 | End: 2022-02-10

## 2022-02-10 RX ORDER — DIPHENHYDRAMINE HYDROCHLORIDE 50 MG/ML
12.5 INJECTION INTRAMUSCULAR; INTRAVENOUS EVERY 6 HOURS
Status: DISCONTINUED | OUTPATIENT
Start: 2022-02-10 | End: 2022-02-11

## 2022-02-10 RX ORDER — ASPIRIN 81 MG/1
81 TABLET ORAL EVERY MORNING
Status: DISCONTINUED | OUTPATIENT
Start: 2022-02-11 | End: 2022-02-11 | Stop reason: HOSPADM

## 2022-02-10 RX ORDER — HYDRALAZINE HYDROCHLORIDE 20 MG/ML
INJECTION INTRAMUSCULAR; INTRAVENOUS
Status: DISCONTINUED | OUTPATIENT
Start: 2022-02-10 | End: 2022-02-10 | Stop reason: HOSPADM

## 2022-02-10 RX ORDER — FENTANYL CITRATE 50 UG/ML
INJECTION, SOLUTION INTRAMUSCULAR; INTRAVENOUS
Status: DISCONTINUED | OUTPATIENT
Start: 2022-02-10 | End: 2022-02-10 | Stop reason: HOSPADM

## 2022-02-10 RX ORDER — AMLODIPINE BESYLATE 5 MG/1
5 TABLET ORAL DAILY
Qty: 30 TABLET | Refills: 11 | Status: ON HOLD | OUTPATIENT
Start: 2022-02-10 | End: 2022-02-21 | Stop reason: HOSPADM

## 2022-02-10 RX ORDER — NITROGLYCERIN 20 MG/100ML
0-400 INJECTION INTRAVENOUS CONTINUOUS
Status: DISCONTINUED | OUTPATIENT
Start: 2022-02-10 | End: 2022-02-11 | Stop reason: HOSPADM

## 2022-02-10 RX ORDER — FAMOTIDINE 20 MG/50ML
20 INJECTION, SOLUTION INTRAVENOUS DAILY
Status: DISCONTINUED | OUTPATIENT
Start: 2022-02-11 | End: 2022-02-10

## 2022-02-10 RX ORDER — HYDRALAZINE HYDROCHLORIDE 20 MG/ML
15 INJECTION INTRAMUSCULAR; INTRAVENOUS ONCE
Status: COMPLETED | OUTPATIENT
Start: 2022-02-10 | End: 2022-02-10

## 2022-02-10 RX ORDER — FAMOTIDINE 10 MG/ML
20 INJECTION INTRAVENOUS DAILY
Status: DISCONTINUED | OUTPATIENT
Start: 2022-02-11 | End: 2022-02-11

## 2022-02-10 RX ORDER — MORPHINE SULFATE 10 MG/ML
2 INJECTION INTRAMUSCULAR; INTRAVENOUS; SUBCUTANEOUS ONCE
Status: COMPLETED | OUTPATIENT
Start: 2022-02-10 | End: 2022-02-10

## 2022-02-10 RX ORDER — HYDRALAZINE HYDROCHLORIDE 20 MG/ML
10 INJECTION INTRAMUSCULAR; INTRAVENOUS EVERY 8 HOURS PRN
Status: DISCONTINUED | OUTPATIENT
Start: 2022-02-10 | End: 2022-02-11 | Stop reason: HOSPADM

## 2022-02-10 RX ORDER — NALOXONE HCL 0.4 MG/ML
0.02 VIAL (ML) INJECTION
Status: DISCONTINUED | OUTPATIENT
Start: 2022-02-10 | End: 2022-02-11 | Stop reason: HOSPADM

## 2022-02-10 RX ORDER — IBUPROFEN 200 MG
16 TABLET ORAL
Status: DISCONTINUED | OUTPATIENT
Start: 2022-02-10 | End: 2022-02-11 | Stop reason: HOSPADM

## 2022-02-10 RX ORDER — SODIUM CHLORIDE 0.9 % (FLUSH) 0.9 %
10 SYRINGE (ML) INJECTION EVERY 6 HOURS PRN
Status: DISCONTINUED | OUTPATIENT
Start: 2022-02-10 | End: 2022-02-11 | Stop reason: HOSPADM

## 2022-02-10 RX ORDER — LIDOCAINE HYDROCHLORIDE 20 MG/ML
10 SOLUTION OROPHARYNGEAL ONCE
Status: COMPLETED | OUTPATIENT
Start: 2022-02-10 | End: 2022-02-10

## 2022-02-10 RX ORDER — METOPROLOL TARTRATE 1 MG/ML
INJECTION, SOLUTION INTRAVENOUS
Status: DISCONTINUED | OUTPATIENT
Start: 2022-02-10 | End: 2022-02-10 | Stop reason: HOSPADM

## 2022-02-10 RX ORDER — AMOXICILLIN 250 MG
2 CAPSULE ORAL 2 TIMES DAILY PRN
Status: DISCONTINUED | OUTPATIENT
Start: 2022-02-10 | End: 2022-02-11 | Stop reason: HOSPADM

## 2022-02-10 RX ORDER — NITROGLYCERIN 20 MG/100ML
0-400 INJECTION INTRAVENOUS CONTINUOUS
Status: DISCONTINUED | OUTPATIENT
Start: 2022-02-10 | End: 2022-02-10

## 2022-02-10 RX ORDER — ATORVASTATIN CALCIUM 40 MG/1
40 TABLET, FILM COATED ORAL NIGHTLY
Status: DISCONTINUED | OUTPATIENT
Start: 2022-02-10 | End: 2022-02-11 | Stop reason: HOSPADM

## 2022-02-10 RX ORDER — ALBUTEROL SULFATE 90 UG/1
2 AEROSOL, METERED RESPIRATORY (INHALATION) EVERY 6 HOURS PRN
Status: DISCONTINUED | OUTPATIENT
Start: 2022-02-10 | End: 2022-02-11

## 2022-02-10 RX ORDER — AMLODIPINE BESYLATE 5 MG/1
5 TABLET ORAL DAILY
Status: DISCONTINUED | OUTPATIENT
Start: 2022-02-11 | End: 2022-02-11 | Stop reason: HOSPADM

## 2022-02-10 RX ORDER — BENAZEPRIL HYDROCHLORIDE 20 MG/1
40 TABLET ORAL ONCE
Status: DISCONTINUED | OUTPATIENT
Start: 2022-02-10 | End: 2022-02-10

## 2022-02-10 RX ORDER — MIDAZOLAM HYDROCHLORIDE 1 MG/ML
INJECTION INTRAMUSCULAR; INTRAVENOUS
Status: DISCONTINUED | OUTPATIENT
Start: 2022-02-10 | End: 2022-02-10 | Stop reason: HOSPADM

## 2022-02-10 RX ORDER — ACETAMINOPHEN 325 MG/1
650 TABLET ORAL EVERY 4 HOURS PRN
Status: DISCONTINUED | OUTPATIENT
Start: 2022-02-10 | End: 2022-02-11 | Stop reason: HOSPADM

## 2022-02-10 RX ADMIN — NITROGLYCERIN 40 MCG/MIN: 20 INJECTION INTRAVENOUS at 02:02

## 2022-02-10 RX ADMIN — ALUMINA, MAGNESIA, AND SIMETHICONE ORAL SUSPENSION REGULAR STRENGTH 30 ML: 1200; 1200; 120 SUSPENSION ORAL at 03:02

## 2022-02-10 RX ADMIN — ONDANSETRON 8 MG: 4 TABLET, ORALLY DISINTEGRATING ORAL at 02:02

## 2022-02-10 RX ADMIN — HYDROCORTISONE SODIUM SUCCINATE 60 MG: 100 INJECTION, POWDER, FOR SOLUTION INTRAMUSCULAR; INTRAVENOUS at 06:02

## 2022-02-10 RX ADMIN — ACETYLCYSTEINE 1200 MG: 200 SOLUTION ORAL; RESPIRATORY (INHALATION) at 07:02

## 2022-02-10 RX ADMIN — AMLODIPINE BESYLATE 5 MG: 5 TABLET ORAL at 12:02

## 2022-02-10 RX ADMIN — SODIUM CHLORIDE: 0.45 INJECTION, SOLUTION INTRAVENOUS at 10:02

## 2022-02-10 RX ADMIN — DIPHENHYDRAMINE HYDROCHLORIDE 25 MG: 50 INJECTION INTRAMUSCULAR; INTRAVENOUS at 02:02

## 2022-02-10 RX ADMIN — ACETYLCYSTEINE 1200 MG: 200 SOLUTION ORAL; RESPIRATORY (INHALATION) at 01:02

## 2022-02-10 RX ADMIN — MORPHINE SULFATE 2 MG: 10 INJECTION INTRAVENOUS at 02:02

## 2022-02-10 RX ADMIN — ENOXAPARIN SODIUM 40 MG: 40 INJECTION SUBCUTANEOUS at 10:02

## 2022-02-10 RX ADMIN — DIPHENHYDRAMINE HYDROCHLORIDE 12.5 MG: 50 INJECTION INTRAMUSCULAR; INTRAVENOUS at 06:02

## 2022-02-10 RX ADMIN — SODIUM CHLORIDE: 0.45 INJECTION, SOLUTION INTRAVENOUS at 09:02

## 2022-02-10 RX ADMIN — IPRATROPIUM BROMIDE AND ALBUTEROL SULFATE 3 ML: .5; 3 SOLUTION RESPIRATORY (INHALATION) at 07:02

## 2022-02-10 RX ADMIN — DIPHENHYDRAMINE HYDROCHLORIDE 50 MG: 25 CAPSULE ORAL at 06:02

## 2022-02-10 RX ADMIN — ATORVASTATIN CALCIUM 40 MG: 40 TABLET, FILM COATED ORAL at 10:02

## 2022-02-10 RX ADMIN — LIDOCAINE HYDROCHLORIDE 10 ML: 20 SOLUTION ORAL; TOPICAL at 03:02

## 2022-02-10 RX ADMIN — HYDRALAZINE HYDROCHLORIDE 15 MG: 20 INJECTION, SOLUTION INTRAMUSCULAR; INTRAVENOUS at 01:02

## 2022-02-10 RX ADMIN — HUMAN INSULIN 2 UNITS: 100 INJECTION, SOLUTION SUBCUTANEOUS at 10:02

## 2022-02-10 NOTE — H&P
[facility]  History & Physical    Patient Name: Marisol Kerr  MRN: [id number]  Patient Class: IP- Inpatient  Admission Date: 2/10/2022  Attending Physician: Cristian Benjamin MD   Primary Care Provider: Khadar Dwyer MD         Patient information was obtained from patient, relative(s), past medical records and ER records.     Subjective:     Principal Problem:Acute on chronic respiratory failure    Chief Complaint: No chief complaint on file.       HPI: Ms. Kerr is a 76-year-old female who underwent left heart catheterization earlier this morning, hospital medicine consulted for admission postprocedure.  Patient with a known history of CAD status post previous PCI, states she was having intermittent chest discomfort and using nitroglycerin at home.  Has listed allergy to iodinated contrast, was premedicated prior to procedure, had left heart catheterization performed earlier today.  At around 1:00 p.m., after dose of Mucomyst developed chest tightness/chest pain midsternal nonradiating, associated with shortness of breath with respiratory distress and nausea.  More recently patient has developed rash to her face and neck area.  Denies any difficulty tolerating her oral secretions, sensation of airways closing, no wheeze but does report difficult to breathe.  Known history of COPD, on 2-3 L home oxygen, followed by pulmonologist Dr. Correa.  Blood pressure was also elevated with systolics greater than 200s, she is currently on nitroglycerin drip, received her home antihypertensives including amlodipine and benazepril prior to my encounter, SBP currently 170. States she previously had history of diabetes but this has improved and no longer takes medication for same.  Has been afebrile.  Discussed with cardiologist, significant coronary artery disease, including mid in stent circumflex stenosis, RCA disease, LAD about 50% disease.  Previous labs with potassium 5.2, renal  dysfunction with BUN/creatinine 30/1.6 (baseline).  Discussed with RN at bedside.  Discussed with patient and granddaughter present at bedside.      Past Medical History:   Diagnosis Date    CAD (coronary artery disease)     Cancer     colon    CHF (congestive heart failure)     Colitis     Colon cancer     COPD (chronic obstructive pulmonary disease)     Decreased hearing     Diabetes mellitus     Diabetes mellitus, type 2     Hypercholesterolemia     Hypertension     Hypoxemia     Insomnia     Obesity     Osteoarthritis        Past Surgical History:   Procedure Laterality Date    CARDIAC CATHETERIZATION      CHOLECYSTECTOMY      COLECTOMY      CORONARY ANGIOPLASTY      CORONARY STENT PLACEMENT      EXTERNAL EAR SURGERY      EYE SURGERY      FLEXIBLE SIGMOIDOSCOPY N/A 9/19/2019    Procedure: SIGMOIDOSCOPY, FLEXIBLE;  Surgeon: Biju Kramer III, MD;  Location: [facility];  Service: Endoscopy;  Laterality: N/A;    HYSTERECTOMY      partial       Review of patient's allergies indicates:   Allergen Reactions    Iodinated contrast media     Strawberries [strawberry] Hives and Itching       No current facility-administered medications on file prior to encounter.     Current Outpatient Medications on File Prior to Encounter   Medication Sig    acetaminophen (TYLENOL) 500 MG tablet Take 500 mg by mouth every 6 (six) hours as needed for Pain.     albuterol (VENTOLIN HFA) 90 mcg/actuation inhaler Inhale 2 puffs into the lungs every 6 (six) hours as needed for Wheezing or Shortness of Breath. Rescue    aspirin (ECOTRIN) 81 MG EC tablet Take 81 mg by mouth every morning.     benazepriL (LOTENSIN) 40 MG tablet Take 1 tablet (40 mg total) by mouth once daily.    coenzyme Q10 100 mg capsule Take 100 mg by mouth every morning.    fish oil-omega-3 fatty acids 300-1,000 mg capsule Take 1 capsule by mouth every morning.    FLAXSEED OIL ORAL Take 1,200 mg by mouth every morning.     fluticasone-salmeterol diskus inhaler 250-50 mcg Inhale 1 puff into the lungs 2 (two) times daily.    furosemide (LASIX) 20 MG tablet Take 1 tablet (20 mg total) by mouth every other day.    ipratropium-albuteroL (COMBIVENT RESPIMAT)  mcg/actuation inhaler Inhale 2 puffs into the lungs every 4 (four) hours as needed for Shortness of Breath. Rescue    lovastatin (MEVACOR) 40 MG tablet Take 0.5 tablets (20 mg total) by mouth every evening. (Patient taking differently: Take 40 mg by mouth every other day.)    metoprolol succinate (TOPROL-XL) 50 MG 24 hr tablet Take 0.5 tablets (25 mg total) by mouth once daily.    NARCAN 4 mg/actuation Spry     nitroGLYCERIN 0.2 mg/hr TD PT24 (NITRODUR) 0.2 mg/hr APPLY 1 PATCH TOPICALLY ONCE DAILY TO LEG.    nitroGLYCERIN 0.4 MG/DOSE TL SPRY (NITROLINGUAL) 400 mcg/spray spray USE ONE SPRAY UNDER THE TONGUE EVERY 5 MINUTES AS NEEDED FOR CHEST PAIN.  DO NOT EXCEED A TOTAL OF 3 SPRAYS IN 15 MINUTES    ondansetron (ZOFRAN-ODT) 4 MG TbDL Take 2 tablets (8 mg total) by mouth every 6 (six) hours as needed.    pantoprazole (PROTONIX) 40 MG tablet Take 1 tablet (40 mg total) by mouth once daily.    PNV NO.115/IRON FUMARATE/FA (PRENATAL #115-IRON-FOLIC ACID ORAL) Take 1 tablet by mouth every morning.     predniSONE (DELTASONE) 20 MG tablet Take 1 tablet (20 mg total) by mouth As instructed. Take 3 tabs the night before procedure & 3 tabs morning of procedure    temazepam (RESTORIL) 15 mg Cap Take 1 capsule (15 mg total) by mouth every evening.    triamcinolone acetonide 0.1% (KENALOG) 0.1 % cream Apply topically 2 (two) times daily. For legs.    umeclidinium (INCRUSE ELLIPTA) 62.5 mcg/actuation inhalation capsule Inhale 1 capsule (63 mcg total) into the lungs every morning. Controller    vit C/E/Zn/coppr/lutein/zeaxan (PRESERVISION AREDS-2 ORAL) Take 1 capsule by mouth every morning.    [DISCONTINUED] amLODIPine (NORVASC) 2.5 MG tablet Take 1 tablet (2.5 mg total) by mouth  once daily.    [DISCONTINUED] meloxicam (MOBIC) 7.5 MG tablet Take 1 tablet (7.5 mg total) by mouth once daily.    bromfenac (PROLENSA) 0.07 % Drop Place 1 drop into both eyes daily as needed.    COVID-19 vacc,mRNA,Moderna,/PF (MODERNA COVID-19 VACCINE, EUA, IM) ADMINISTER 0.5ML IN THE MUSCLE AS DIRECTED    dextran 70-hypromellose 0.1-0.3 % Drop Place 1 drop into both eyes daily as needed.    ergocalciferol (VITAMIN D2) 50,000 unit Cap Take 1 capsule (50,000 Units total) by mouth every 7 days.    [DISCONTINUED] spironolactone (ALDACTONE) 25 MG tablet Take 1 tablet (25 mg total) by mouth once daily. (Patient not taking: Reported on 2/9/2022)     Family History     Problem Relation (Age of Onset)    Febrile seizures Mother    Heart failure Father        Tobacco Use    Smoking status: Former Smoker     Packs/day: 3.00     Years: 50.00     Pack years: 150.00     Types: Cigarettes     Start date: 3/30/1964     Quit date: 2/28/2006     Years since quitting: 15.9    Smokeless tobacco: Never Used    Tobacco comment: ex smoker 15 years   Substance and Sexual Activity    Alcohol use: Yes    Drug use: No    Sexual activity: Never     Review of Systems   Constitutional: Negative for fever.   HENT: Negative for drooling and trouble swallowing.    Respiratory: Positive for cough and shortness of breath.    Cardiovascular: Positive for chest pain and leg swelling.   Gastrointestinal: Positive for nausea. Negative for vomiting.   Musculoskeletal: Positive for gait problem (uses wheelchair out of the house for longer distances, cane in house).   Skin: Positive for rash (face).        Redness legs, chronic   Allergic/Immunologic: Negative for immunocompromised state.   Neurological: Negative for seizures.   Psychiatric/Behavioral: Negative for confusion.     Objective:     Vital Signs (Most Recent):  Temp: 97.8 °F (36.6 °C) (02/10/22 0621)  Pulse: 108 (02/10/22 1515)  Resp: (!) 42 (02/10/22 1515)  BP: (!) 161/56 (02/10/22  1515)  SpO2: 100 % (02/10/22 1515) Vital Signs (24h Range):  Temp:  [97.8 °F (36.6 °C)] 97.8 °F (36.6 °C)  Pulse:  [] 108  Resp:  [13-48] 42  SpO2:  [95 %-100 %] 100 %  BP: (139-217)/(55-89) 161/56     Weight: 86.2 kg (190 lb)  Body mass index is 33.66 kg/m².    Physical Exam  Vitals and nursing note reviewed.   Constitutional:       Appearance: She is obese.      Comments: Sitting in stretcher, ill appearing   HENT:      Head: Normocephalic and atraumatic.      Comments: Facial erythematous rash noted from neck to above     Mouth/Throat:      Mouth: Mucous membranes are moist.      Comments: No appreciated posterior pharyngeal or lingual swelling   Cardiovascular:      Rate and Rhythm: Regular rhythm. Tachycardia present.      Comments: Edema pitting lower extremities  Pulmonary:      Comments: On supplemental O2 via NC, + tachypnea, using accessory muscles, distant breath sounds  Abdominal:      Comments: Protuberant with pannus, non tender   Genitourinary:     Comments: Wearing diaper, pure wick catheter in place  Skin:     Comments: Venous stasis changes with induration and erythema, R>L bilateral lower extremities, dried feet. R groin angio access dressing in place   Neurological:      Mental Status: She is alert and oriented to person, place, and time.      Comments: Speech intact, [place], answering questions, following simple commands all four extremities   Psychiatric:         Mood and Affect: Mood normal.             Significant Labs:   Blood Culture: No results for input(s): LABBLOO in the last 48 hours.  BMP:   Recent Labs   Lab 02/10/22  0538   *      K 5.2*      CO2 28   BUN 30*   CREATININE 1.6*   CALCIUM 9.3     CBC:   Recent Labs   Lab 02/10/22  0538   WBC 6.67   HGB 13.2   HCT 43.8        CMP:   Recent Labs   Lab 02/10/22  0538      K 5.2*      CO2 28   *   BUN 30*   CREATININE 1.6*   CALCIUM 9.3   ANIONGAP 11   EGFRNONAA 31.5*     Cardiac Markers: No  results for input(s): CKMB, MYOGLOBIN, BNP, TROPISTAT in the last 48 hours.  Coagulation:   Recent Labs   Lab 02/10/22  0538   INR 1.0   APTT 25.5     Lactic Acid: No results for input(s): LACTATE in the last 48 hours.  Magnesium: No results for input(s): MG in the last 48 hours.  POCT Glucose: No results for input(s): POCTGLUCOSE in the last 48 hours.  Respiratory Culture: No results for input(s): GSRESP, RESPIRATORYC in the last 48 hours.  Troponin: No results for input(s): TROPONINI in the last 48 hours.  TSH:   Recent Labs   Lab 09/21/21  1100   TSH 0.340     Urine Culture: No results for input(s): LABURIN in the last 48 hours.  Urine Studies: No results for input(s): COLORU, APPEARANCEUA, PHUR, SPECGRAV, PROTEINUA, GLUCUA, KETONESU, BILIRUBINUA, OCCULTUA, NITRITE, UROBILINOGEN, LEUKOCYTESUR, RBCUA, WBCUA, BACTERIA, SQUAMEPITHEL, HYALINECASTS in the last 48 hours.    Invalid input(s): WRIGHTSUR    Significant Imaging: I have reviewed and interpreted all pertinent imaging results/findings within the past 24 hours.     No results found.      Assessment/Plan:     Active Hospital Problems    Diagnosis    *Acute on chronic respiratory failure    Hypertensive urgency    Hyperkalemia    Allergic reaction    Venous stasis    Hard of hearing    Primary insomnia    CHF (congestive heart failure)    COPD/emphysema    Lung nodule    Class 2 obesity in adult    CKD (chronic kidney disease), stage III    Diabetic peripheral vascular disease    Coronary artery disease, multivessel with history of previous PCI    Hypertension    Gastroesophageal reflux disease without esophagitis     Plan:  Admit inpatient, ICU, continuous cardiac monitoring  Post left heart catheterization monitoring as per protocol  Chest x-ray now, p.r.n. ABG  Discussed with patient, trial of BiPAP given respiratory distress with close monitoring  Start famotidine/brief course steroid/Benadryl for possible allergic reaction  Scheduling  [place] and following  Known severe CAD as per discussion with cardiology, continue aspirin, statin therapy  Elevate legs with chronic venous stasis changes  Potassium 5.2 earlier this morning, repeating CMP now, membrane stabilizing medications as needed  Blood pressure still not well controlled, continue nitroglycerin drip, continue home amlodipine and Ace inhibitor, IV p.r.n. hydralazine with parameters, monitor and adjust as needed  Complete echocardiogram ordered  Renally dosing all medications and avoiding nephrotoxic drugs  A.m. labs ordered  Consult cardiology  Consult Pulmonary, patient of Dr. Correa  Further plan as per clinical course    VTE Risk Mitigation (From admission, onward)         Ordered     enoxaparin injection 40 mg  Daily         02/10/22 1551     IP VTE HIGH RISK PATIENT  Once         02/10/22 1551     Place sequential compression device  Until discontinued         02/10/22 1551                   Maci Glover MD  Department of Hospital Medicine   [facility]

## 2022-02-10 NOTE — NURSING
At 1400 pt started with chest pain, SOB BP elevated 's, Sats 99% on 4l N/C , pt still on bedrest and had had mucomyst at 1345. EKG complete, copy sent to Dr. Benjamin, pt took her home nitro SL at 1406, 1412, 1419, No relief, keeping Dr Benjamin informed, See new orders and MAR for meds given, Nitro drip,Morphine IVP, benadryl 25mg IV , pt face noted to have reddened area around chin and left side of face. Informed Dr. Benjamin pain started after giving mucomyst and pt only able to be elevated at 30 degrees at that time, see order for GI cocktail given and sitting pt up , off of bedrest at this time 1540, Dr. Glover and Dr. Saul rounded on pt. Pt feeling much better at time of transfer to ICU, 's to 160's, right groin remain soft to touch, drsg dry and intact

## 2022-02-10 NOTE — SUBJECTIVE & OBJECTIVE
Past Medical History:   Diagnosis Date    CAD (coronary artery disease)     Cancer     colon    CHF (congestive heart failure)     Colitis     Colon cancer     COPD (chronic obstructive pulmonary disease)     Decreased hearing     Diabetes mellitus     Diabetes mellitus, type 2     Hypercholesterolemia     Hypertension     Hypoxemia     Insomnia     Obesity     Osteoarthritis        Past Surgical History:   Procedure Laterality Date    CARDIAC CATHETERIZATION      CHOLECYSTECTOMY      COLECTOMY      CORONARY ANGIOPLASTY      CORONARY STENT PLACEMENT      EXTERNAL EAR SURGERY      EYE SURGERY      FLEXIBLE SIGMOIDOSCOPY N/A 9/19/2019    Procedure: SIGMOIDOSCOPY, FLEXIBLE;  Surgeon: Biju Kramer III, MD;  Location: [facility];  Service: Endoscopy;  Laterality: N/A;    HYSTERECTOMY      partial       Review of patient's allergies indicates:   Allergen Reactions    Iodinated contrast media     Strawberries [strawberry] Hives and Itching       No current facility-administered medications on file prior to encounter.     Current Outpatient Medications on File Prior to Encounter   Medication Sig    acetaminophen (TYLENOL) 500 MG tablet Take 500 mg by mouth every 6 (six) hours as needed for Pain.     albuterol (VENTOLIN HFA) 90 mcg/actuation inhaler Inhale 2 puffs into the lungs every 6 (six) hours as needed for Wheezing or Shortness of Breath. Rescue    aspirin (ECOTRIN) 81 MG EC tablet Take 81 mg by mouth every morning.     benazepriL (LOTENSIN) 40 MG tablet Take 1 tablet (40 mg total) by mouth once daily.    coenzyme Q10 100 mg capsule Take 100 mg by mouth every morning.    fish oil-omega-3 fatty acids 300-1,000 mg capsule Take 1 capsule by mouth every morning.    FLAXSEED OIL ORAL Take 1,200 mg by mouth every morning.    fluticasone-salmeterol diskus inhaler 250-50 mcg Inhale 1 puff into the lungs 2 (two) times daily.    furosemide (LASIX) 20 MG tablet Take 1 tablet (20 mg total)  by mouth every other day.    ipratropium-albuteroL (COMBIVENT RESPIMAT)  mcg/actuation inhaler Inhale 2 puffs into the lungs every 4 (four) hours as needed for Shortness of Breath. Rescue    lovastatin (MEVACOR) 40 MG tablet Take 0.5 tablets (20 mg total) by mouth every evening. (Patient taking differently: Take 40 mg by mouth every other day.)    metoprolol succinate (TOPROL-XL) 50 MG 24 hr tablet Take 0.5 tablets (25 mg total) by mouth once daily.    NARCAN 4 mg/actuation Spry     nitroGLYCERIN 0.2 mg/hr TD PT24 (NITRODUR) 0.2 mg/hr APPLY 1 PATCH TOPICALLY ONCE DAILY TO LEG.    nitroGLYCERIN 0.4 MG/DOSE TL SPRY (NITROLINGUAL) 400 mcg/spray spray USE ONE SPRAY UNDER THE TONGUE EVERY 5 MINUTES AS NEEDED FOR CHEST PAIN.  DO NOT EXCEED A TOTAL OF 3 SPRAYS IN 15 MINUTES    ondansetron (ZOFRAN-ODT) 4 MG TbDL Take 2 tablets (8 mg total) by mouth every 6 (six) hours as needed.    pantoprazole (PROTONIX) 40 MG tablet Take 1 tablet (40 mg total) by mouth once daily.    PNV NO.115/IRON FUMARATE/FA (PRENATAL #115-IRON-FOLIC ACID ORAL) Take 1 tablet by mouth every morning.     predniSONE (DELTASONE) 20 MG tablet Take 1 tablet (20 mg total) by mouth As instructed. Take 3 tabs the night before procedure & 3 tabs morning of procedure    temazepam (RESTORIL) 15 mg Cap Take 1 capsule (15 mg total) by mouth every evening.    triamcinolone acetonide 0.1% (KENALOG) 0.1 % cream Apply topically 2 (two) times daily. For legs.    umeclidinium (INCRUSE ELLIPTA) 62.5 mcg/actuation inhalation capsule Inhale 1 capsule (63 mcg total) into the lungs every morning. Controller    vit C/E/Zn/coppr/lutein/zeaxan (PRESERVISION AREDS-2 ORAL) Take 1 capsule by mouth every morning.    [DISCONTINUED] amLODIPine (NORVASC) 2.5 MG tablet Take 1 tablet (2.5 mg total) by mouth once daily.    [DISCONTINUED] meloxicam (MOBIC) 7.5 MG tablet Take 1 tablet (7.5 mg total) by mouth once daily.    bromfenac (PROLENSA) 0.07 % Drop Place 1 drop  into both eyes daily as needed.    COVID-19 vacc,mRNA,Moderna,/PF (MODERNA COVID-19 VACCINE, EUA, IM) ADMINISTER 0.5ML IN THE MUSCLE AS DIRECTED    dextran 70-hypromellose 0.1-0.3 % Drop Place 1 drop into both eyes daily as needed.    ergocalciferol (VITAMIN D2) 50,000 unit Cap Take 1 capsule (50,000 Units total) by mouth every 7 days.    [DISCONTINUED] spironolactone (ALDACTONE) 25 MG tablet Take 1 tablet (25 mg total) by mouth once daily. (Patient not taking: Reported on 2/9/2022)     Family History     Problem Relation (Age of Onset)    Febrile seizures Mother    Heart failure Father        Tobacco Use    Smoking status: Former Smoker     Packs/day: 3.00     Years: 50.00     Pack years: 150.00     Types: Cigarettes     Start date: 3/30/1964     Quit date: 2/28/2006     Years since quitting: 15.9    Smokeless tobacco: Never Used    Tobacco comment: ex smoker 15 years   Substance and Sexual Activity    Alcohol use: Yes    Drug use: No    Sexual activity: Never     Review of Systems   Constitutional: Negative for fever.   HENT: Negative for drooling and trouble swallowing.    Respiratory: Positive for cough and shortness of breath.    Cardiovascular: Positive for chest pain and leg swelling.   Gastrointestinal: Positive for nausea. Negative for vomiting.   Musculoskeletal: Positive for gait problem (uses wheelchair out of the house for longer distances, cane in house).   Skin: Positive for rash (face).        Redness legs, chronic   Allergic/Immunologic: Negative for immunocompromised state.   Neurological: Negative for seizures.   Psychiatric/Behavioral: Negative for confusion.     Objective:     Vital Signs (Most Recent):  Temp: 97.8 °F (36.6 °C) (02/10/22 0621)  Pulse: 108 (02/10/22 1515)  Resp: (!) 42 (02/10/22 1515)  BP: (!) 161/56 (02/10/22 1515)  SpO2: 100 % (02/10/22 1515) Vital Signs (24h Range):  Temp:  [97.8 °F (36.6 °C)] 97.8 °F (36.6 °C)  Pulse:  [] 108  Resp:  [13-48] 42  SpO2:  [95  %-100 %] 100 %  BP: (139-217)/(55-89) 161/56     Weight: 86.2 kg (190 lb)  Body mass index is 33.66 kg/m².    Physical Exam  Vitals and nursing note reviewed.   Constitutional:       Appearance: She is obese.      Comments: Sitting in stretcher, ill appearing   HENT:      Head: Normocephalic and atraumatic.      Comments: Facial erythematous rash noted from neck to above     Mouth/Throat:      Mouth: Mucous membranes are moist.      Comments: No appreciated posterior pharyngeal or lingual swelling   Cardiovascular:      Rate and Rhythm: Regular rhythm. Tachycardia present.      Comments: Edema pitting lower extremities  Pulmonary:      Comments: On supplemental O2 via NC, + tachypnea, using accessory muscles, distant breath sounds  Abdominal:      Comments: Protuberant with pannus, non tender   Genitourinary:     Comments: Wearing diaper, pure wick catheter in place  Skin:     Comments: Venous stasis changes with induration and erythema, R>L bilateral lower extremities, dried feet. R groin angio access dressing in place   Neurological:      Mental Status: She is alert and oriented to person, place, and time.      Comments: Speech intact, [place], answering questions, following simple commands all four extremities   Psychiatric:         Mood and Affect: Mood normal.             Significant Labs:   Blood Culture: No results for input(s): LABBLOO in the last 48 hours.  BMP:   Recent Labs   Lab 02/10/22  0538   *      K 5.2*      CO2 28   BUN 30*   CREATININE 1.6*   CALCIUM 9.3     CBC:   Recent Labs   Lab 02/10/22  0538   WBC 6.67   HGB 13.2   HCT 43.8        CMP:   Recent Labs   Lab 02/10/22  0538      K 5.2*      CO2 28   *   BUN 30*   CREATININE 1.6*   CALCIUM 9.3   ANIONGAP 11   EGFRNONAA 31.5*     Cardiac Markers: No results for input(s): CKMB, MYOGLOBIN, BNP, TROPISTAT in the last 48 hours.  Coagulation:   Recent Labs   Lab 02/10/22  0538   INR 1.0   APTT 25.5     Lactic  Acid: No results for input(s): LACTATE in the last 48 hours.  Magnesium: No results for input(s): MG in the last 48 hours.  POCT Glucose: No results for input(s): POCTGLUCOSE in the last 48 hours.  Respiratory Culture: No results for input(s): GSRESP, RESPIRATORYC in the last 48 hours.  Troponin: No results for input(s): TROPONINI in the last 48 hours.  TSH:   Recent Labs   Lab 09/21/21  1100   TSH 0.340     Urine Culture: No results for input(s): LABURIN in the last 48 hours.  Urine Studies: No results for input(s): COLORU, APPEARANCEUA, PHUR, SPECGRAV, PROTEINUA, GLUCUA, KETONESU, BILIRUBINUA, OCCULTUA, NITRITE, UROBILINOGEN, LEUKOCYTESUR, RBCUA, WBCUA, BACTERIA, SQUAMEPITHEL, HYALINECASTS in the last 48 hours.    Invalid input(s): WRIGHTSUR    Significant Imaging: I have reviewed and interpreted all pertinent imaging results/findings within the past 24 hours.     No results found.

## 2022-02-10 NOTE — CONSULTS
"Pulmonary/Critical Care Consult      Patient name: Marisol Kerr  MRN: [id number]  Date: 02/10/2022    Admit Date: 2/10/2022  Consult Requested By: Cristian Benjamin MD    Reason for Consult: COPD, ASCVD    HPI:    2/10/2022 - 75 yo female known to Dr Correa (severe COPD (GOLD IV D PFT 2020), hypoxemia) who was admitted for cardiac cath (founsd to have significant CAD and needs evaluation for possibe surgery).  About 1 PM she developed some increased SOB (started after a dose of mucomyst) with associated chest discomfort and nausea.  SHe was treated with nitro and got better.  For a period they were considering using BiPAP but she stabilized and is feeling much better now.  She is to be admitted to ICU for observation.  She tells me that they are "considering" transfer to [facility] for possible high risk surgery.  She states that her breathing had been at baseline at home prior to this admission.  SHe has a known pulmoanry nodule which has increased in size on last CT chest (10/21)    Review of Systems    Review of Systems   Constitutional: Positive for malaise/fatigue.   Respiratory: Positive for shortness of breath.    Cardiovascular: Positive for chest pain and leg swelling.   Gastrointestinal: Positive for nausea.   Neurological: Positive for weakness.   Psychiatric/Behavioral: Negative for depression, substance abuse and suicidal ideas.       Past Medical History    Past Medical History:   Diagnosis Date    CAD (coronary artery disease)     Cancer     colon    CHF (congestive heart failure)     Colitis     Colon cancer     COPD (chronic obstructive pulmonary disease)     Decreased hearing     Diabetes mellitus     Diabetes mellitus, type 2     Hypercholesterolemia     Hypertension     Hypoxemia     Insomnia     Obesity     Osteoarthritis        Past Surgical History    Past Surgical History:   Procedure Laterality Date    CARDIAC CATHETERIZATION      CHOLECYSTECTOMY      COLECTOMY   "    CORONARY ANGIOPLASTY      CORONARY STENT PLACEMENT      EXTERNAL EAR SURGERY      EYE SURGERY      FLEXIBLE SIGMOIDOSCOPY N/A 9/19/2019    Procedure: SIGMOIDOSCOPY, FLEXIBLE;  Surgeon: Biju Kramer III, MD;  Location: [facility];  Service: Endoscopy;  Laterality: N/A;    HYSTERECTOMY      partial       Medications (scheduled):      albuterol-ipratropium  3 mL Nebulization Q6H    [START ON 2/11/2022] amLODIPine  5 mg Oral Daily    [START ON 2/11/2022] aspirin  81 mg Oral QAM    atorvastatin  40 mg Oral QHS    diphenhydrAMINE  12.5 mg Intravenous Q6H    enoxaparin  40 mg Subcutaneous Daily    [START ON 2/11/2022] famotidine  20 mg Intravenous Daily    hydrocortisone sodium succinate  60 mg Intravenous Q6H    [START ON 2/11/2022] lisinopriL  40 mg Oral Daily    [START ON 2/11/2022] metoprolol succinate  25 mg Oral Daily    [START ON 2/11/2022] pantoprazole  40 mg Oral Daily       Medications (infusions):      sodium chloride 0.45%      nitroGLYCERIN 30 mcg/min (02/10/22 1634)       Medications (prn):     acetaminophen, acetaminophen, albuterol, dextrose 50%, dextrose 50%, dextrose 50%, dextrose 50%, diphenhydrAMINE, fentaNYL, glucagon (human recombinant), glucose, glucose, hydrALAZINE, hydrALAZINE, insulin regular, LIDOcaine (PF) 10 mg/ml (1%), melatonin, metoprolol, midazolam, naloxone, ondansetron, ondansetron, senna-docusate 8.6-50 mg, sodium chloride 0.9%, zolpidem    Family History:   Family History   Problem Relation Age of Onset    Febrile seizures Mother     Heart failure Father        Social History: Tobacco:   Social History     Tobacco Use   Smoking Status Former Smoker    Packs/day: 3.00    Years: 50.00    Pack years: 150.00    Types: Cigarettes    Start date: 3/30/1964    Quit date: 2/28/2006    Years since quitting: 15.9   Smokeless Tobacco Never Used   Tobacco Comment    ex smoker 15 years                                EtOH:   Social History     Substance and Sexual  "Activity   Alcohol Use Yes                                Drugs:   Social History     Substance and Sexual Activity   Drug Use No         Physical Exam    Vital signs:  Temp:  [97.8 °F (36.6 °C)]   Pulse:  []   Resp:  [13-48]   BP: (139-217)/(55-89)   SpO2:  [95 %-100 %]     Intake/Output: No intake or output data in the 24 hours ending 02/10/22 1708     BMI: Estimated body mass index is 33.66 kg/m² as calculated from the following:    Height as of this encounter: 5' 3" (1.6 m).    Weight as of this encounter: 86.2 kg (190 lb).    Physical Exam  Vitals and nursing note reviewed.   Constitutional:       General: She is not in acute distress.     Appearance: Normal appearance. She is obese. She is not ill-appearing, toxic-appearing or diaphoretic.   HENT:      Head: Normocephalic and atraumatic.      Right Ear: External ear normal.      Left Ear: External ear normal.      Nose: Nose normal. No congestion or rhinorrhea.      Mouth/Throat:      Mouth: Mucous membranes are moist.      Pharynx: Oropharynx is clear. No oropharyngeal exudate or posterior oropharyngeal erythema.   Eyes:      General: No scleral icterus.        Right eye: No discharge.         Left eye: No discharge.      Extraocular Movements: Extraocular movements intact.      Conjunctiva/sclera: Conjunctivae normal.      Pupils: Pupils are equal, round, and reactive to light.   Neck:      Vascular: No carotid bruit.      Comments: JVD not elevated  Cardiovascular:      Rate and Rhythm: Normal rate and regular rhythm.      Pulses: Normal pulses.      Heart sounds: No murmur heard.  No friction rub. No gallop.    Pulmonary:      Effort: Pulmonary effort is normal. No respiratory distress.      Breath sounds: No stridor. No wheezing, rhonchi or rales.      Comments: Decreased BS  Kyphosis  Increased AP chest  Chest:      Chest wall: No tenderness.   Abdominal:      General: Bowel sounds are normal. There is no distension.      Tenderness: There is no " abdominal tenderness. There is no guarding.   Musculoskeletal:         General: No swelling. Normal range of motion.      Cervical back: Normal range of motion and neck supple. No rigidity or tenderness.      Right lower leg: Edema present.      Left lower leg: Edema present.      Comments: #+ with some extension into thighs   Lymphadenopathy:      Cervical: No cervical adenopathy.   Skin:     General: Skin is warm and dry.      Capillary Refill: Capillary refill takes less than 2 seconds.      Coloration: Skin is not jaundiced.      Findings: Erythema present. No bruising.      Comments: Chronic stasis changes   Neurological:      General: No focal deficit present.      Mental Status: She is alert and oriented to person, place, and time. Mental status is at baseline.      Cranial Nerves: No cranial nerve deficit.      Sensory: No sensory deficit.      Motor: No weakness.   Psychiatric:         Mood and Affect: Mood normal.         Behavior: Behavior normal.         Thought Content: Thought content normal.         Judgment: Judgment normal.         Laboratory    Recent Labs   Lab 02/10/22  0538   WBC 6.67   RBC 4.64   HGB 13.2   HCT 43.8      MCV 94   MCH 28.4   MCHC 30.1*       Recent Labs   Lab 02/10/22  0538   CALCIUM 9.3      K 5.2*   CO2 28      BUN 30*   CREATININE 1.6*       Recent Labs   Lab 02/10/22  0538   INR 1.0   APTT 25.5       No results for input(s): CPK, CPKMB, TROPONINI, MB in the last 24 hours.    Additional labs: reviewed    Microbiology:       Microbiology Results (last 7 days)     ** No results found for the last 168 hours. **          Radiology    X-Ray Chest AP Portable  Reason: respiratory distress    FINDINGS:    PA and lateral chest with comparison chest x-ray September 16, 2019 show normal cardiomediastinal silhouette. Right subclavian Port-A-Cath again noted.  Bilateral interstitial lung opacities are unchanged from prior exam. No new confluent airspace opacities.  Pulmonary vasculature is normal. No acute osseous abnormality.    IMPRESSION:    Chronic interstitial lung opacities could reflect scarring or chronic interstitial lung disease.    Electronically signed by:  Pasha Lee DO  2/10/2022 4:11 PM CST Workstation: [id number]        Additional Studies    reviewed    Ventilator Information              No results for input(s): PH, PCO2, PO2, HCO3, POCSATURATED, BE in the last 72 hours.      Impression    Active Hospital Problems    Diagnosis  POA    *Acute on chronic respiratory failure [J96.20]  Yes    Allergic reaction [T78.40XA]  Yes    Hypertensive urgency [I16.0]  Yes    Hyperkalemia [E87.5]  Yes    Venous stasis [I87.8]  Yes     Chronic    Hard of hearing [H91.90]  Yes     Chronic    Primary insomnia [F51.01]  Yes    CHF (congestive heart failure) [I50.9]  Yes     Chronic    COPD/emphysema [J44.9]  Yes     Chronic    Lung nodule [R91.1]  Yes    Class 2 obesity in adult [E66.9]  Yes    CKD (chronic kidney disease), stage III [N18.30]  Yes    Diabetic peripheral vascular disease [E11.51]  Yes    Coronary artery disease, multivessel with history of previous PCI [I25.10]  Yes    Hypertension [I10]  Yes    Gastroesophageal reflux disease without esophagitis [K21.9]  Yes      Resolved Hospital Problems   No resolved problems to display.       Plan    · Continue duoneb treatments and follow  · BiPAP if needed, O2 (usually on 2-3 LPM at home)  · No AECOPD at this time  · Check BNP  · May need diuresis (has chronic edema which may be worse by report)  · Cardiac management per Dr Benjamin  · On NTG drip  · ECHO ordered and pending  · Watch in ICU overnight  · Has pulmonary nodule which has increased in size  · Any discussion of possible surgery is complicated by her severe lung disease and the possibility taht she may have a lung cancer  · Medical management may be the only option she will have    Thank you for this consult.  I will follow with you while  the patient is hospitalized.        Dereje Saul MD  [facility] Pulmonary/Critical Care  02/10/2022

## 2022-02-10 NOTE — HPI
Ms. Kerr is a 76-year-old female who underwent left heart catheterization earlier this morning, hospital medicine consulted for admission postprocedure.  Patient with a known history of CAD status post previous PCI, states she was having intermittent chest discomfort and using nitroglycerin at home.  Has listed allergy to iodinated contrast, was premedicated prior to procedure, had left heart catheterization performed earlier today.  At around 1:00 p.m., after dose of Mucomyst developed chest tightness/chest pain midsternal nonradiating, associated with shortness of breath with respiratory distress and nausea.  More recently patient has developed rash to her face and neck area.  Denies any difficulty tolerating her oral secretions, sensation of airways closing, no wheeze but does report difficult to breathe.  Known history of COPD, on 2-3 L home oxygen, followed by pulmonologist Dr. Correa.  Blood pressure was also elevated with systolics greater than 200s, she is currently on nitroglycerin drip, received her home antihypertensives including amlodipine and benazepril prior to my encounter, SBP currently 170. States she previously had history of diabetes but this has improved and no longer takes medication for same.  Has been afebrile.  Discussed with cardiologist, significant coronary artery disease, including mid in stent circumflex stenosis, RCA disease, LAD about 50% disease.  Previous labs with potassium 5.2, renal dysfunction with BUN/creatinine 30/1.6 (baseline).  Discussed with RN at bedside.  Discussed with patient and granddaughter present at bedside.

## 2022-02-10 NOTE — Clinical Note
35 ml of contrast were injected throughout the case. 
A percutaneous stick to the right femoral 
A pulse oximeter was placed on the patient's left index finger and left index finger.
All catheters were removed.   
All wires were removed.   
An airway assessment has been completed by DR GARDNER   
An angiography of the  right femoral artery was performed to evaluate for the placement of a closure device. 
An angiography was performed of the right coronary arteries. Multiple views were taken. The angiography was performed via power injection. The injected amount was 6 mL contrast at 3 mL/s. The PSI from the power injection was 400. 
ID band present and verified. Family is in the lobby. 
MD AWARE OF HYPERTENSION, NO NEW ORDERS AT THIS TIME.
MD AWARE OF IODINE ALLERGY, WAS PRETREATED. ALSO AWARE OF CREATININE. ELECTS TO CONTINUE WITH SCHEDULED ANGIOGRAM
Phone report was given to [facility]  
The DP pulses were 1+ bilaterally. 
The ECG showed sinus tachycardia. 
The catheter was inserted into the and was inserted over the wire into the ostium   right coronary artery. 
The catheter was inserted over the wire into the ostium   left main. An angiography was performed of the left coronary arteries. Multiple views were taken. The angiography was performed via power injection. The injected amount was 8 mL contrast at 4 mL/s. The PSI from the power injection was 400. 
The catheter was removed from the ostium   left main. 
The catheter was removed from the ostium   right coronary artery. 
The defib pads were placed on the patient's ant/lat chest   
The procedural consent was signed. 
The procedural consent was signed. 
The sheath was inserted into the right femoral artery. 
The sheath was removed from the right femoral artery. 
The site was marked. The groin was prepped. The site was prepped with ChloraPrep. The site was clipped. The patient was draped. The patient was positioned supine. 
The wire was inserted into the aorta.
The wire was removed from the aorta.
dry, intact, no bleeding and no hematoma. 
175

## 2022-02-11 ENCOUNTER — HOSPITAL ENCOUNTER (INPATIENT)
Facility: HOSPITAL | Age: 77
LOS: 10 days | Discharge: NURSING, REHAB OR LONG-TERM CARE | DRG: 270 | End: 2022-02-21
Attending: INTERNAL MEDICINE | Admitting: INTERNAL MEDICINE
Payer: MEDICARE

## 2022-02-11 ENCOUNTER — CLINICAL SUPPORT (OUTPATIENT)
Dept: CARDIOLOGY | Facility: HOSPITAL | Age: 77
DRG: 281 | End: 2022-02-11
Attending: INTERNAL MEDICINE
Payer: MEDICARE

## 2022-02-11 VITALS
HEART RATE: 94 BPM | OXYGEN SATURATION: 99 % | HEIGHT: 63 IN | DIASTOLIC BLOOD PRESSURE: 66 MMHG | WEIGHT: 199.5 LBS | BODY MASS INDEX: 35.35 KG/M2 | SYSTOLIC BLOOD PRESSURE: 149 MMHG | TEMPERATURE: 99 F | RESPIRATION RATE: 33 BRPM

## 2022-02-11 VITALS — HEIGHT: 63 IN | BODY MASS INDEX: 35.35 KG/M2 | WEIGHT: 199.5 LBS

## 2022-02-11 DIAGNOSIS — I21.4 NSTEMI (NON-ST ELEVATED MYOCARDIAL INFARCTION): ICD-10-CM

## 2022-02-11 DIAGNOSIS — I20.0 UNSTABLE ANGINA: ICD-10-CM

## 2022-02-11 DIAGNOSIS — I10 ESSENTIAL HYPERTENSION, BENIGN: ICD-10-CM

## 2022-02-11 DIAGNOSIS — N18.32 STAGE 3B CHRONIC KIDNEY DISEASE: ICD-10-CM

## 2022-02-11 DIAGNOSIS — E11.8 DM TYPE 2, CONTROLLED, WITH COMPLICATION: Primary | ICD-10-CM

## 2022-02-11 PROBLEM — T78.40XA ALLERGIC REACTION: Status: RESOLVED | Noted: 2022-02-10 | Resolved: 2022-02-11

## 2022-02-11 PROBLEM — I50.33 ACUTE ON CHRONIC DIASTOLIC CONGESTIVE HEART FAILURE: Status: ACTIVE | Noted: 2019-09-17

## 2022-02-11 PROBLEM — E87.5 HYPERKALEMIA: Status: RESOLVED | Noted: 2022-02-10 | Resolved: 2022-02-11

## 2022-02-11 LAB
ALBUMIN SERPL BCP-MCNC: 3.3 G/DL (ref 3.5–5.2)
ALP SERPL-CCNC: 46 U/L (ref 55–135)
ALT SERPL W/O P-5'-P-CCNC: 14 U/L (ref 10–44)
ANION GAP SERPL CALC-SCNC: 7 MMOL/L (ref 8–16)
AORTIC ROOT ANNULUS: 2.79 CM
AORTIC VALVE CUSP SEPERATION: 1.48 CM
APTT BLDCRRT: 28.8 SEC (ref 21–32)
APTT PPP: 126.5 SEC (ref 23.3–35.1)
AST SERPL-CCNC: 29 U/L (ref 10–40)
AV INDEX (PROSTH): 0.67
AV MEAN GRADIENT: 8 MMHG
AV PEAK GRADIENT: 16 MMHG
AV VALVE AREA: 2.35 CM2
AV VELOCITY RATIO: 65.27
BASOPHILS # BLD AUTO: 0.02 K/UL (ref 0–0.2)
BASOPHILS # BLD AUTO: 0.02 K/UL (ref 0–0.2)
BASOPHILS NFR BLD: 0.2 % (ref 0–1.9)
BASOPHILS NFR BLD: 0.2 % (ref 0–1.9)
BILIRUB SERPL-MCNC: 0.6 MG/DL (ref 0.1–1)
BSA FOR ECHO PROCEDURE: 2.01 M2
BUN SERPL-MCNC: 32 MG/DL (ref 8–23)
CALCIUM SERPL-MCNC: 9 MG/DL (ref 8.7–10.5)
CHLORIDE SERPL-SCNC: 103 MMOL/L (ref 95–110)
CO2 SERPL-SCNC: 30 MMOL/L (ref 23–29)
CREAT SERPL-MCNC: 1.6 MG/DL (ref 0.5–1.4)
CV ECHO LV RWT: 0.26 CM
DIFFERENTIAL METHOD: ABNORMAL
DIFFERENTIAL METHOD: ABNORMAL
DOP CALC AO PEAK VEL: 2 M/S
DOP CALC AO VTI: 40.28 CM
DOP CALC LVOT AREA: 3.5 CM2
DOP CALC LVOT DIAMETER: 2.11 CM
DOP CALC LVOT PEAK VEL: 130.53 M/S
DOP CALC LVOT STROKE VOLUME: 94.57 CM3
DOP CALCLVOT PEAK VEL VTI: 27.06 CM
E WAVE DECELERATION TIME: 269.35 MSEC
E/A RATIO: 0.81
E/E' RATIO: 14.36 M/S
ECHO LV POSTERIOR WALL: 0.75 CM (ref 0.6–1.1)
EJECTION FRACTION: 60 %
EOSINOPHIL # BLD AUTO: 0 K/UL (ref 0–0.5)
EOSINOPHIL # BLD AUTO: 0 K/UL (ref 0–0.5)
EOSINOPHIL NFR BLD: 0 % (ref 0–8)
EOSINOPHIL NFR BLD: 0.1 % (ref 0–8)
ERYTHROCYTE [DISTWIDTH] IN BLOOD BY AUTOMATED COUNT: 13 % (ref 11.5–14.5)
ERYTHROCYTE [DISTWIDTH] IN BLOOD BY AUTOMATED COUNT: 13.1 % (ref 11.5–14.5)
EST. GFR  (AFRICAN AMERICAN): 36.3 ML/MIN/1.73 M^2
EST. GFR  (NON AFRICAN AMERICAN): 31.5 ML/MIN/1.73 M^2
FRACTIONAL SHORTENING: 33 % (ref 28–44)
GLUCOSE SERPL-MCNC: 123 MG/DL (ref 70–110)
GLUCOSE SERPL-MCNC: 135 MG/DL (ref 70–110)
GLUCOSE SERPL-MCNC: 152 MG/DL (ref 70–110)
HCT VFR BLD AUTO: 35.7 % (ref 37–48.5)
HCT VFR BLD AUTO: 36.4 % (ref 37–48.5)
HGB BLD-MCNC: 10.9 G/DL (ref 12–16)
HGB BLD-MCNC: 11 G/DL (ref 12–16)
IMM GRANULOCYTES # BLD AUTO: 0.03 K/UL (ref 0–0.04)
IMM GRANULOCYTES # BLD AUTO: 0.05 K/UL (ref 0–0.04)
IMM GRANULOCYTES NFR BLD AUTO: 0.3 % (ref 0–0.5)
IMM GRANULOCYTES NFR BLD AUTO: 0.6 % (ref 0–0.5)
INR PPP: 1.1
INTERVENTRICULAR SEPTUM: 0.75 CM (ref 0.6–1.1)
IVRT: 70.45 MSEC
LACTATE SERPL-SCNC: 1.2 MMOL/L (ref 0.5–2.2)
LEFT ATRIUM SIZE: 4.81 CM
LEFT INTERNAL DIMENSION IN SYSTOLE: 3.92 CM (ref 2.1–4)
LEFT VENTRICLE MASS INDEX: 84 G/M2
LEFT VENTRICULAR INTERNAL DIMENSION IN DIASTOLE: 5.81 CM (ref 3.5–6)
LEFT VENTRICULAR MASS: 162.42 G
LV LATERAL E/E' RATIO: 13.17 M/S
LV SEPTAL E/E' RATIO: 15.8 M/S
LYMPHOCYTES # BLD AUTO: 0.6 K/UL (ref 1–4.8)
LYMPHOCYTES # BLD AUTO: 0.9 K/UL (ref 1–4.8)
LYMPHOCYTES NFR BLD: 6.3 % (ref 18–48)
LYMPHOCYTES NFR BLD: 9.9 % (ref 18–48)
MAGNESIUM SERPL-MCNC: 2.1 MG/DL (ref 1.6–2.6)
MCH RBC QN AUTO: 28.2 PG (ref 27–31)
MCH RBC QN AUTO: 28.4 PG (ref 27–31)
MCHC RBC AUTO-ENTMCNC: 30.2 G/DL (ref 32–36)
MCHC RBC AUTO-ENTMCNC: 30.5 G/DL (ref 32–36)
MCV RBC AUTO: 93 FL (ref 82–98)
MCV RBC AUTO: 93 FL (ref 82–98)
MONOCYTES # BLD AUTO: 0.3 K/UL (ref 0.3–1)
MONOCYTES # BLD AUTO: 0.7 K/UL (ref 0.3–1)
MONOCYTES NFR BLD: 3.3 % (ref 4–15)
MONOCYTES NFR BLD: 7.5 % (ref 4–15)
MV PEAK A VEL: 0.97 M/S
MV PEAK E VEL: 0.79 M/S
NEUTROPHILS # BLD AUTO: 7.8 K/UL (ref 1.8–7.7)
NEUTROPHILS # BLD AUTO: 7.9 K/UL (ref 1.8–7.7)
NEUTROPHILS NFR BLD: 82 % (ref 38–73)
NEUTROPHILS NFR BLD: 89.6 % (ref 38–73)
NRBC BLD-RTO: 0 /100 WBC
NRBC BLD-RTO: 0 /100 WBC
PISA TR MAX VEL: 2.92 M/S
PLATELET # BLD AUTO: 161 K/UL (ref 150–450)
PLATELET # BLD AUTO: 167 K/UL (ref 150–450)
PMV BLD AUTO: 12.2 FL (ref 9.2–12.9)
PMV BLD AUTO: 12.3 FL (ref 9.2–12.9)
POCT GLUCOSE: 107 MG/DL (ref 70–110)
POCT GLUCOSE: 85 MG/DL (ref 70–110)
POCT GLUCOSE: 97 MG/DL (ref 70–110)
POTASSIUM SERPL-SCNC: 4.9 MMOL/L (ref 3.5–5.1)
PROT SERPL-MCNC: 5.8 G/DL (ref 6–8.4)
PROTHROMBIN TIME: 13.3 SEC (ref 11.4–13.7)
PV PEAK VELOCITY: 188.22 CM/S
RA PRESSURE: 3 MMHG
RBC # BLD AUTO: 3.84 M/UL (ref 4–5.4)
RBC # BLD AUTO: 3.9 M/UL (ref 4–5.4)
RIGHT VENTRICULAR END-DIASTOLIC DIMENSION: 195 CM
SODIUM SERPL-SCNC: 140 MMOL/L (ref 136–145)
TDI LATERAL: 0.06 M/S
TDI SEPTAL: 0.05 M/S
TDI: 0.06 M/S
TR MAX PG: 34 MMHG
TROPONIN I SERPL DL<=0.01 NG/ML-MCNC: 3.83 NG/ML (ref 0–0.03)
TV REST PULMONARY ARTERY PRESSURE: 37 MMHG
WBC # BLD AUTO: 8.82 K/UL (ref 3.9–12.7)
WBC # BLD AUTO: 9.48 K/UL (ref 3.9–12.7)

## 2022-02-11 PROCEDURE — 93010 ELECTROCARDIOGRAM REPORT: CPT | Mod: 77,,, | Performed by: SPECIALIST

## 2022-02-11 PROCEDURE — 25000242 PHARM REV CODE 250 ALT 637 W/ HCPCS

## 2022-02-11 PROCEDURE — 25000003 PHARM REV CODE 250: Performed by: STUDENT IN AN ORGANIZED HEALTH CARE EDUCATION/TRAINING PROGRAM

## 2022-02-11 PROCEDURE — 99900031 HC PATIENT EDUCATION (STAT)

## 2022-02-11 PROCEDURE — 82962 GLUCOSE BLOOD TEST: CPT

## 2022-02-11 PROCEDURE — 99233 SBSQ HOSP IP/OBS HIGH 50: CPT | Mod: GC,,, | Performed by: INTERNAL MEDICINE

## 2022-02-11 PROCEDURE — 27000221 HC OXYGEN, UP TO 24 HOURS

## 2022-02-11 PROCEDURE — 36415 COLL VENOUS BLD VENIPUNCTURE: CPT | Performed by: INTERNAL MEDICINE

## 2022-02-11 PROCEDURE — 63600175 PHARM REV CODE 636 W HCPCS: Performed by: INTERNAL MEDICINE

## 2022-02-11 PROCEDURE — 93306 TTE W/DOPPLER COMPLETE: CPT

## 2022-02-11 PROCEDURE — 25000242 PHARM REV CODE 250 ALT 637 W/ HCPCS: Performed by: INTERNAL MEDICINE

## 2022-02-11 PROCEDURE — 85730 THROMBOPLASTIN TIME PARTIAL: CPT | Performed by: INTERNAL MEDICINE

## 2022-02-11 PROCEDURE — 94761 N-INVAS EAR/PLS OXIMETRY MLT: CPT

## 2022-02-11 PROCEDURE — 94640 AIRWAY INHALATION TREATMENT: CPT

## 2022-02-11 PROCEDURE — 93010 EKG 12-LEAD: ICD-10-PCS | Mod: 77,,, | Performed by: SPECIALIST

## 2022-02-11 PROCEDURE — 93010 ELECTROCARDIOGRAM REPORT: CPT | Mod: ,,, | Performed by: INTERNAL MEDICINE

## 2022-02-11 PROCEDURE — 99900035 HC TECH TIME PER 15 MIN (STAT)

## 2022-02-11 PROCEDURE — 99291 PR CRITICAL CARE, E/M 30-74 MINUTES: ICD-10-PCS | Mod: GC,,, | Performed by: INTERNAL MEDICINE

## 2022-02-11 PROCEDURE — 85025 COMPLETE CBC W/AUTO DIFF WBC: CPT | Mod: 91 | Performed by: INTERNAL MEDICINE

## 2022-02-11 PROCEDURE — 25000003 PHARM REV CODE 250: Performed by: INTERNAL MEDICINE

## 2022-02-11 PROCEDURE — 80053 COMPREHEN METABOLIC PANEL: CPT | Performed by: INTERNAL MEDICINE

## 2022-02-11 PROCEDURE — 25000003 PHARM REV CODE 250

## 2022-02-11 PROCEDURE — 99291 CRITICAL CARE FIRST HOUR: CPT | Mod: GC,,, | Performed by: INTERNAL MEDICINE

## 2022-02-11 PROCEDURE — 93005 ELECTROCARDIOGRAM TRACING: CPT | Performed by: SPECIALIST

## 2022-02-11 PROCEDURE — 85025 COMPLETE CBC W/AUTO DIFF WBC: CPT | Performed by: INTERNAL MEDICINE

## 2022-02-11 PROCEDURE — 25000242 PHARM REV CODE 250 ALT 637 W/ HCPCS: Performed by: STUDENT IN AN ORGANIZED HEALTH CARE EDUCATION/TRAINING PROGRAM

## 2022-02-11 PROCEDURE — 99233 PR SUBSEQUENT HOSPITAL CARE,LEVL III: ICD-10-PCS | Mod: GC,,, | Performed by: INTERNAL MEDICINE

## 2022-02-11 PROCEDURE — 85610 PROTHROMBIN TIME: CPT | Performed by: INTERNAL MEDICINE

## 2022-02-11 PROCEDURE — 93306 TTE W/DOPPLER COMPLETE: CPT | Mod: 26,,, | Performed by: INTERNAL MEDICINE

## 2022-02-11 PROCEDURE — 93005 ELECTROCARDIOGRAM TRACING: CPT

## 2022-02-11 PROCEDURE — 99233 SBSQ HOSP IP/OBS HIGH 50: CPT | Mod: ,,, | Performed by: INTERNAL MEDICINE

## 2022-02-11 PROCEDURE — 84484 ASSAY OF TROPONIN QUANT: CPT | Performed by: STUDENT IN AN ORGANIZED HEALTH CARE EDUCATION/TRAINING PROGRAM

## 2022-02-11 PROCEDURE — 85730 THROMBOPLASTIN TIME PARTIAL: CPT | Mod: 91 | Performed by: INTERNAL MEDICINE

## 2022-02-11 PROCEDURE — 83605 ASSAY OF LACTIC ACID: CPT | Performed by: STUDENT IN AN ORGANIZED HEALTH CARE EDUCATION/TRAINING PROGRAM

## 2022-02-11 PROCEDURE — 83735 ASSAY OF MAGNESIUM: CPT | Performed by: INTERNAL MEDICINE

## 2022-02-11 PROCEDURE — 99233 PR SUBSEQUENT HOSPITAL CARE,LEVL III: ICD-10-PCS | Mod: ,,, | Performed by: INTERNAL MEDICINE

## 2022-02-11 PROCEDURE — 63600175 PHARM REV CODE 636 W HCPCS

## 2022-02-11 PROCEDURE — 93010 EKG 12-LEAD: ICD-10-PCS | Mod: ,,, | Performed by: INTERNAL MEDICINE

## 2022-02-11 PROCEDURE — 93306 ECHO (CUPID ONLY): ICD-10-PCS | Mod: 26,,, | Performed by: INTERNAL MEDICINE

## 2022-02-11 PROCEDURE — 20000000 HC ICU ROOM

## 2022-02-11 RX ORDER — NITROGLYCERIN 20 MG/100ML
0-400 INJECTION INTRAVENOUS CONTINUOUS
Status: DISCONTINUED | OUTPATIENT
Start: 2022-02-11 | End: 2022-02-15

## 2022-02-11 RX ORDER — FAMOTIDINE 20 MG/1
20 TABLET, FILM COATED ORAL EVERY 8 HOURS
Status: DISCONTINUED | OUTPATIENT
Start: 2022-02-11 | End: 2022-02-12

## 2022-02-11 RX ORDER — GLUCAGON 1 MG
1 KIT INJECTION
Status: DISCONTINUED | OUTPATIENT
Start: 2022-02-11 | End: 2022-02-21 | Stop reason: HOSPADM

## 2022-02-11 RX ORDER — METOPROLOL SUCCINATE 25 MG/1
25 TABLET, EXTENDED RELEASE ORAL ONCE
Status: COMPLETED | OUTPATIENT
Start: 2022-02-11 | End: 2022-02-11

## 2022-02-11 RX ORDER — ASPIRIN 325 MG
325 TABLET ORAL DAILY
Status: DISCONTINUED | OUTPATIENT
Start: 2022-02-11 | End: 2022-02-12

## 2022-02-11 RX ORDER — MORPHINE SULFATE 2 MG/ML
INJECTION, SOLUTION INTRAMUSCULAR; INTRAVENOUS
Status: COMPLETED
Start: 2022-02-11 | End: 2022-02-11

## 2022-02-11 RX ORDER — SODIUM CHLORIDE 0.9 % (FLUSH) 0.9 %
10 SYRINGE (ML) INJECTION
Status: DISCONTINUED | OUTPATIENT
Start: 2022-02-11 | End: 2022-02-21 | Stop reason: HOSPADM

## 2022-02-11 RX ORDER — DIPHENHYDRAMINE HCL 50 MG
50 CAPSULE ORAL EVERY 8 HOURS
Status: DISCONTINUED | OUTPATIENT
Start: 2022-02-11 | End: 2022-02-15

## 2022-02-11 RX ORDER — METOPROLOL SUCCINATE 50 MG/1
50 TABLET, EXTENDED RELEASE ORAL DAILY
Status: DISCONTINUED | OUTPATIENT
Start: 2022-02-12 | End: 2022-02-11 | Stop reason: HOSPADM

## 2022-02-11 RX ORDER — IPRATROPIUM BROMIDE AND ALBUTEROL SULFATE 2.5; .5 MG/3ML; MG/3ML
3 SOLUTION RESPIRATORY (INHALATION) EVERY 6 HOURS PRN
Status: DISCONTINUED | OUTPATIENT
Start: 2022-02-11 | End: 2022-02-11 | Stop reason: HOSPADM

## 2022-02-11 RX ORDER — ATORVASTATIN CALCIUM 20 MG/1
80 TABLET, FILM COATED ORAL DAILY
Status: DISCONTINUED | OUTPATIENT
Start: 2022-02-12 | End: 2022-02-11

## 2022-02-11 RX ORDER — ACETAMINOPHEN 325 MG/1
650 TABLET ORAL EVERY 4 HOURS PRN
Status: DISCONTINUED | OUTPATIENT
Start: 2022-02-11 | End: 2022-02-21 | Stop reason: HOSPADM

## 2022-02-11 RX ORDER — METOPROLOL TARTRATE 1 MG/ML
5 INJECTION, SOLUTION INTRAVENOUS EVERY 5 MIN PRN
Status: DISCONTINUED | OUTPATIENT
Start: 2022-02-11 | End: 2022-02-11 | Stop reason: HOSPADM

## 2022-02-11 RX ORDER — ASPIRIN 325 MG
325 TABLET ORAL DAILY
Status: DISCONTINUED | OUTPATIENT
Start: 2022-02-12 | End: 2022-02-11

## 2022-02-11 RX ORDER — HYDROCODONE BITARTRATE AND ACETAMINOPHEN 5; 325 MG/1; MG/1
1 TABLET ORAL EVERY 4 HOURS PRN
Status: DISCONTINUED | OUTPATIENT
Start: 2022-02-11 | End: 2022-02-21 | Stop reason: HOSPADM

## 2022-02-11 RX ORDER — IBUPROFEN 200 MG
24 TABLET ORAL
Status: DISCONTINUED | OUTPATIENT
Start: 2022-02-11 | End: 2022-02-21 | Stop reason: HOSPADM

## 2022-02-11 RX ORDER — IBUPROFEN 200 MG
16 TABLET ORAL
Status: DISCONTINUED | OUTPATIENT
Start: 2022-02-11 | End: 2022-02-21 | Stop reason: HOSPADM

## 2022-02-11 RX ORDER — ATORVASTATIN CALCIUM 20 MG/1
80 TABLET, FILM COATED ORAL DAILY
Status: DISCONTINUED | OUTPATIENT
Start: 2022-02-12 | End: 2022-02-12

## 2022-02-11 RX ORDER — NITROGLYCERIN 0.4 MG/1
TABLET SUBLINGUAL
Status: COMPLETED
Start: 2022-02-11 | End: 2022-02-11

## 2022-02-11 RX ORDER — NITROGLYCERIN 20 MG/100ML
INJECTION INTRAVENOUS
Status: DISCONTINUED
Start: 2022-02-11 | End: 2022-02-11 | Stop reason: HOSPADM

## 2022-02-11 RX ORDER — METOPROLOL SUCCINATE 25 MG/1
25 TABLET, EXTENDED RELEASE ORAL DAILY
Status: DISCONTINUED | OUTPATIENT
Start: 2022-02-12 | End: 2022-02-11

## 2022-02-11 RX ORDER — PANTOPRAZOLE SODIUM 40 MG/1
40 TABLET, DELAYED RELEASE ORAL DAILY
Status: DISCONTINUED | OUTPATIENT
Start: 2022-02-12 | End: 2022-02-21 | Stop reason: HOSPADM

## 2022-02-11 RX ORDER — NITROGLYCERIN 20 MG/100ML
35 INJECTION INTRAVENOUS CONTINUOUS
Status: DISCONTINUED | OUTPATIENT
Start: 2022-02-11 | End: 2022-02-11

## 2022-02-11 RX ORDER — IPRATROPIUM BROMIDE AND ALBUTEROL SULFATE 2.5; .5 MG/3ML; MG/3ML
3 SOLUTION RESPIRATORY (INHALATION)
Status: DISCONTINUED | OUTPATIENT
Start: 2022-02-11 | End: 2022-02-21 | Stop reason: HOSPADM

## 2022-02-11 RX ORDER — FLUTICASONE FUROATE AND VILANTEROL 100; 25 UG/1; UG/1
1 POWDER RESPIRATORY (INHALATION) DAILY
Refills: 1 | Status: DISCONTINUED | OUTPATIENT
Start: 2022-02-12 | End: 2022-02-21 | Stop reason: HOSPADM

## 2022-02-11 RX ORDER — AMLODIPINE BESYLATE 10 MG/1
10 TABLET ORAL DAILY
Status: DISCONTINUED | OUTPATIENT
Start: 2022-02-12 | End: 2022-02-21 | Stop reason: HOSPADM

## 2022-02-11 RX ORDER — INSULIN ASPART 100 [IU]/ML
0-5 INJECTION, SOLUTION INTRAVENOUS; SUBCUTANEOUS
Status: DISCONTINUED | OUTPATIENT
Start: 2022-02-11 | End: 2022-02-21 | Stop reason: HOSPADM

## 2022-02-11 RX ORDER — METOPROLOL SUCCINATE 50 MG/1
50 TABLET, EXTENDED RELEASE ORAL DAILY
Status: DISCONTINUED | OUTPATIENT
Start: 2022-02-12 | End: 2022-02-21 | Stop reason: HOSPADM

## 2022-02-11 RX ORDER — METOPROLOL TARTRATE 1 MG/ML
INJECTION, SOLUTION INTRAVENOUS
Status: COMPLETED
Start: 2022-02-11 | End: 2022-02-11

## 2022-02-11 RX ORDER — ATORVASTATIN CALCIUM 20 MG/1
40 TABLET, FILM COATED ORAL DAILY
Status: DISCONTINUED | OUTPATIENT
Start: 2022-02-11 | End: 2022-02-11

## 2022-02-11 RX ORDER — ONDANSETRON 2 MG/ML
4 INJECTION INTRAMUSCULAR; INTRAVENOUS EVERY 8 HOURS PRN
Status: DISCONTINUED | OUTPATIENT
Start: 2022-02-11 | End: 2022-02-21 | Stop reason: HOSPADM

## 2022-02-11 RX ORDER — HEPARIN SODIUM,PORCINE/D5W 25000/250
0-40 INTRAVENOUS SOLUTION INTRAVENOUS CONTINUOUS
Status: CANCELLED | OUTPATIENT
Start: 2022-02-11

## 2022-02-11 RX ORDER — HEPARIN SODIUM,PORCINE/D5W 25000/250
0-40 INTRAVENOUS SOLUTION INTRAVENOUS CONTINUOUS
Status: DISCONTINUED | OUTPATIENT
Start: 2022-02-11 | End: 2022-02-14

## 2022-02-11 RX ORDER — ASPIRIN 81 MG/1
81 TABLET ORAL EVERY MORNING
Status: DISCONTINUED | OUTPATIENT
Start: 2022-02-12 | End: 2022-02-11

## 2022-02-11 RX ORDER — ONDANSETRON 4 MG/1
8 TABLET, ORALLY DISINTEGRATING ORAL EVERY 8 HOURS PRN
Status: DISCONTINUED | OUTPATIENT
Start: 2022-02-11 | End: 2022-02-21 | Stop reason: HOSPADM

## 2022-02-11 RX ORDER — HEPARIN SODIUM,PORCINE/D5W 25000/250
0-40 INTRAVENOUS SOLUTION INTRAVENOUS CONTINUOUS
Status: DISCONTINUED | OUTPATIENT
Start: 2022-02-11 | End: 2022-02-11

## 2022-02-11 RX ORDER — MORPHINE SULFATE 2 MG/ML
2 INJECTION, SOLUTION INTRAMUSCULAR; INTRAVENOUS ONCE
Status: COMPLETED | OUTPATIENT
Start: 2022-02-11 | End: 2022-02-11

## 2022-02-11 RX ORDER — HEPARIN SODIUM,PORCINE/D5W 25000/250
0-40 INTRAVENOUS SOLUTION INTRAVENOUS CONTINUOUS
Status: DISCONTINUED | OUTPATIENT
Start: 2022-02-11 | End: 2022-02-11 | Stop reason: HOSPADM

## 2022-02-11 RX ORDER — NITROGLYCERIN 0.4 MG/1
0.4 TABLET SUBLINGUAL EVERY 5 MIN PRN
Status: DISCONTINUED | OUTPATIENT
Start: 2022-02-11 | End: 2022-02-11 | Stop reason: HOSPADM

## 2022-02-11 RX ORDER — NITROGLYCERIN 20 MG/100ML
0-400 INJECTION INTRAVENOUS CONTINUOUS
Status: DISCONTINUED | OUTPATIENT
Start: 2022-02-11 | End: 2022-02-11

## 2022-02-11 RX ORDER — TALC
6 POWDER (GRAM) TOPICAL NIGHTLY PRN
Status: DISCONTINUED | OUTPATIENT
Start: 2022-02-11 | End: 2022-02-21 | Stop reason: HOSPADM

## 2022-02-11 RX ADMIN — MORPHINE SULFATE 2 MG: 2 INJECTION, SOLUTION INTRAMUSCULAR; INTRAVENOUS at 09:02

## 2022-02-11 RX ADMIN — HYDROCORTISONE SODIUM SUCCINATE 60 MG: 100 INJECTION, POWDER, FOR SOLUTION INTRAMUSCULAR; INTRAVENOUS at 06:02

## 2022-02-11 RX ADMIN — METOPROLOL SUCCINATE 25 MG: 25 TABLET, FILM COATED, EXTENDED RELEASE ORAL at 08:02

## 2022-02-11 RX ADMIN — NITROGLYCERIN 70 MCG/MIN: 20 INJECTION INTRAVENOUS at 12:02

## 2022-02-11 RX ADMIN — IPRATROPIUM BROMIDE AND ALBUTEROL SULFATE 3 ML: .5; 3 SOLUTION RESPIRATORY (INHALATION) at 08:02

## 2022-02-11 RX ADMIN — AMLODIPINE BESYLATE 5 MG: 5 TABLET ORAL at 08:02

## 2022-02-11 RX ADMIN — ATORVASTATIN CALCIUM 40 MG: 20 TABLET, FILM COATED ORAL at 04:02

## 2022-02-11 RX ADMIN — ASPIRIN 325 MG ORAL TABLET 325 MG: 325 PILL ORAL at 04:02

## 2022-02-11 RX ADMIN — METOPROLOL TARTRATE 5 MG: 1 INJECTION, SOLUTION INTRAVENOUS at 10:02

## 2022-02-11 RX ADMIN — NITROGLYCERIN: 0.4 TABLET SUBLINGUAL at 10:02

## 2022-02-11 RX ADMIN — HUMAN INSULIN 2 UNITS: 100 INJECTION, SOLUTION SUBCUTANEOUS at 11:02

## 2022-02-11 RX ADMIN — PANTOPRAZOLE SODIUM 40 MG: 40 TABLET, DELAYED RELEASE ORAL at 06:02

## 2022-02-11 RX ADMIN — IPRATROPIUM BROMIDE AND ALBUTEROL SULFATE 3 ML: 2.5; .5 SOLUTION RESPIRATORY (INHALATION) at 08:02

## 2022-02-11 RX ADMIN — DIPHENHYDRAMINE HYDROCHLORIDE 50 MG: 50 CAPSULE ORAL at 09:02

## 2022-02-11 RX ADMIN — ACETAMINOPHEN 650 MG: 325 TABLET ORAL at 11:02

## 2022-02-11 RX ADMIN — ASPIRIN 81 MG: 81 TABLET, DELAYED RELEASE ORAL at 06:02

## 2022-02-11 RX ADMIN — HEPARIN SODIUM 12 UNITS/KG/HR: 10000 INJECTION, SOLUTION INTRAVENOUS at 10:02

## 2022-02-11 RX ADMIN — LISINOPRIL 40 MG: 20 TABLET ORAL at 08:02

## 2022-02-11 RX ADMIN — PREDNISONE 50 MG: 20 TABLET ORAL at 09:02

## 2022-02-11 RX ADMIN — METOPROLOL SUCCINATE 25 MG: 25 TABLET, FILM COATED, EXTENDED RELEASE ORAL at 10:02

## 2022-02-11 RX ADMIN — DIPHENHYDRAMINE HYDROCHLORIDE 12.5 MG: 50 INJECTION INTRAMUSCULAR; INTRAVENOUS at 06:02

## 2022-02-11 RX ADMIN — HYDROCORTISONE SODIUM SUCCINATE 60 MG: 100 INJECTION, POWDER, FOR SOLUTION INTRAMUSCULAR; INTRAVENOUS at 01:02

## 2022-02-11 RX ADMIN — FAMOTIDINE 20 MG: 20 TABLET, FILM COATED ORAL at 09:02

## 2022-02-11 RX ADMIN — METOPROLOL TARTRATE 5 MG: 5 INJECTION, SOLUTION INTRAVENOUS at 10:02

## 2022-02-11 RX ADMIN — DIPHENHYDRAMINE HYDROCHLORIDE 12.5 MG: 50 INJECTION INTRAMUSCULAR; INTRAVENOUS at 01:02

## 2022-02-11 RX ADMIN — IPRATROPIUM BROMIDE AND ALBUTEROL SULFATE 3 ML: .5; 3 SOLUTION RESPIRATORY (INHALATION) at 01:02

## 2022-02-11 RX ADMIN — METOPROLOL SUCCINATE 25 MG: 25 TABLET, EXTENDED RELEASE ORAL at 06:02

## 2022-02-11 RX ADMIN — HYDRALAZINE HYDROCHLORIDE 10 MG: 20 INJECTION, SOLUTION INTRAMUSCULAR; INTRAVENOUS at 09:02

## 2022-02-11 NOTE — ASSESSMENT & PLAN NOTE
Last A1c:   Lab Results   Component Value Date    HGBA1C 5.2 01/27/2022          Blood Sugars (AccuCheck):  Recent Labs     02/11/22  1430   POCTGLUCOSE 107         PLAN  Antihyperglycemics (From admission, onward)            Start     Stop Route Frequency Ordered    02/11/22 1827  insulin aspart U-100 pen 0-5 Units         -- SubQ Before meals & nightly PRN 02/11/22 1727      - Diabetic diet  - POCT glucose ACHS  - Will monitor glucose results and adjust insulin regimen accordingly

## 2022-02-11 NOTE — HPI
"Ms Marisol Kerr is a 76 year old female with PMH of DM2, HTN, HLD, CKD, COPD who initially presented to [facility] yesterday for a left heart catheterization to evaluate  recently increasing episodes of angina requiring more frequent Nitroglycerin use. She has history of iodine allergy and was pretreated with prednisone, but post- procedure she developed SOB, respiratory distress requiring overnight hospitalization for monitoring. She also had elevated blood pressures with systolics greater than 200s during hospitalization. Overnight, as she woke up to urinate she felt 10/10 chest pain all over that she described as "burning", and "veins being ripped apart". STAT EKG at the time showed concerns for  lateral precordial ST depressions. She was initiated on nitroglycerin drip, heparin drip. One dose of  morphine 2mg & Lopressor 5mg IV were also administered. She reported alleviation of her chest pain after,  and it has not recurred on Nitroglycerin drip. She was transferred to the hospital for further evaluation of her ACS and potential emergent intervention. LHC at outside hospital was notable for significant coraonary artery disease:    Angiogram, Coronary, with Left Heart Cath[02/10/22]  The RPAV lesion was 100% stenosed.  The RPDA lesion was 100% stenosed.  The Prox Cx to Mid Cx lesion was 80% stenosed.  The Dist Cx lesion was 70% stenosed.  The 1st LPL lesion was 90% stenosed.  The Prox RCA-2 lesion was 70% stenosed.  The Prox RCA-1 lesion was 90% stenosed.  The Mid LAD-2 lesion was 60% stenosed.  The estimated blood loss was <50 mL.  There was three vessel coronary artery disease.       "

## 2022-02-11 NOTE — ASSESSMENT & PLAN NOTE
Baseline creatinine 1.5- 1.8   Creatinine stable for now.   Recent Labs     02/10/22  0538 02/10/22  1806 02/11/22  0312   CREATININE 1.6* 1.4 1.6*   BUN 30* 29* 32*     Estimated Creatinine Clearance: 32.8 mL/min (A) (based on SCr of 1.6 mg/dL (H)). according to latest data.     PLAN  Monitor UOP and serial BMP and adjust therapy as needed.   Avoid nephrotoxic meds  Renally dose meds.

## 2022-02-11 NOTE — ASSESSMENT & PLAN NOTE
Known history of severe COPD (GOLD IV D PFT 2020). On home oxygen (2-3L)    Home medications: albuterol (VENTOLIN HFA) 90 mcg/actuation inhaler, ipratropium-albuteroL (COMBIVENT RESPIMAT)  mcg/actuation inhaler,  umeclidinium (INCRUSE ELLIPTA) 62.5 mcg/actuation inhalation capsule, fluticasone-salmeterol diskus inhaler 250-50 mcg      PLAN  VBG PRN  Supplementary oxygen via NC with titration to maintain  O2 sats >90%  Duonebs q6h while awake  Tiotropium inhaler

## 2022-02-11 NOTE — PROGRESS NOTES
"Pulmonary/Critical Care  Progress Note      Patient name: Marisol Kerr  MRN: [id number]  Date: 02/11/2022    Admit Date: 2/10/2022  Consult Requested By: Maci Glover MD    Reason for Consult: COPD, ASCVD    HPI:    2/10/2022 - 77 yo female known to Dr Correa (severe COPD (GOLD IV D PFT 2020), hypoxemia) who was admitted for cardiac cath (founsd to have significant CAD and needs evaluation for possibe surgery).  About 1 PM she developed some increased SOB (started after a dose of mucomyst) with associated chest discomfort and nausea.  SHe was treated with nitro and got better.  For a period they were considering using BiPAP but she stabilized and is feeling much better now.  She is to be admitted to ICU for observation.  She tells me that they are "considering" transfer to [facility] for possible high risk surgery.  She states that her breathing had been at baseline at home prior to this admission.  SHe has a known pulmoanry nodule which has increased in size on last CT chest (10/21)    2/11/2022 - When I saw pt she was stable and fine and was asking about DC home.  Later she developed chest pain (see cardiology note).  Med changes have been made and they are looking into possible transfer to [facility] for high risk PCI.  Respiratory status remains stable but she could decompensate quickly.    Review of Systems    Review of Systems   Constitutional: Positive for malaise/fatigue.   Respiratory: Positive for shortness of breath.    Cardiovascular: Positive for chest pain and leg swelling.   Gastrointestinal: Positive for nausea.   Neurological: Positive for weakness.   Psychiatric/Behavioral: Negative for depression, substance abuse and suicidal ideas.       Past Medical History    Past Medical History:   Diagnosis Date    CAD (coronary artery disease)     Cancer     colon    CHF (congestive heart failure)     Colitis     Colon cancer     COPD (chronic obstructive pulmonary disease)     Decreased " hearing     Diabetes mellitus     Diabetes mellitus, type 2     Hypercholesterolemia     Hypertension     Hypoxemia     Insomnia     Obesity     Osteoarthritis        Past Surgical History    Past Surgical History:   Procedure Laterality Date    CARDIAC CATHETERIZATION      CHOLECYSTECTOMY      COLECTOMY      CORONARY ANGIOPLASTY      CORONARY STENT PLACEMENT      EXTERNAL EAR SURGERY      EYE SURGERY      FLEXIBLE SIGMOIDOSCOPY N/A 9/19/2019    Procedure: SIGMOIDOSCOPY, FLEXIBLE;  Surgeon: Biju Kramer III, MD;  [facility];  Service: Endoscopy;  Laterality: N/A;    HYSTERECTOMY      partial       Medications (scheduled):      amLODIPine  5 mg Oral Daily    aspirin  81 mg Oral QAM    atorvastatin  40 mg Oral QHS    heparin (PORCINE)  59.2 Units/kg (Adjusted) Intravenous Once    lisinopriL  40 mg Oral Daily    [START ON 2/12/2022] metoprolol succinate  50 mg Oral Daily    pantoprazole  40 mg Oral Daily       Medications (infusions):      heparin (porcine) in D5W 12 Units/kg/hr (02/11/22 1018)    nitroGLYCERIN 40 mcg/min (02/11/22 0920)       Medications (prn):     acetaminophen, albuterol, dextrose 50%, dextrose 50%, dextrose 50%, dextrose 50%, glucagon (human recombinant), glucose, glucose, heparin (PORCINE), heparin (PORCINE), hydrALAZINE, insulin regular, melatonin, metoprolol, naloxone, nitroGLYCERIN, ondansetron, ondansetron, senna-docusate 8.6-50 mg, sodium chloride 0.9%, zolpidem    Family History:   Family History   Problem Relation Age of Onset    Febrile seizures Mother     Heart failure Father        Social History: Tobacco:   Social History     Tobacco Use   Smoking Status Former Smoker    Packs/day: 3.00    Years: 50.00    Pack years: 150.00    Types: Cigarettes    Start date: 3/30/1964    Quit date: 2/28/2006    Years since quitting: 15.9   Smokeless Tobacco Never Used   Tobacco Comment    ex smoker 15 years                                EtOH:   Social  "History     Substance and Sexual Activity   Alcohol Use Yes                                Drugs:   Social History     Substance and Sexual Activity   Drug Use No         Physical Exam    Vital signs:  Temp:  [97.9 °F (36.6 °C)-99.1 °F (37.3 °C)]   Pulse:  []   Resp:  [14-48]   BP: (112-241)/()   SpO2:  [92 %-100 %]     Intake/Output:     Intake/Output Summary (Last 24 hours) at 2/11/2022 1125  Last data filed at 2/11/2022 0800  Gross per 24 hour   Intake 1063.83 ml   Output 1000 ml   Net 63.83 ml        BMI: Estimated body mass index is 35.34 kg/m² as calculated from the following:    Height as of this encounter: 5' 3" (1.6 m).    Weight as of this encounter: 90.5 kg (199 lb 8.3 oz).    Physical Exam  Vitals and nursing note reviewed.   Constitutional:       General: She is not in acute distress.     Appearance: Normal appearance. She is obese. She is not ill-appearing, toxic-appearing or diaphoretic.   HENT:      Head: Normocephalic and atraumatic.      Right Ear: External ear normal.      Left Ear: External ear normal.      Nose: Nose normal. No congestion or rhinorrhea.      Mouth/Throat:      Mouth: Mucous membranes are moist.      Pharynx: Oropharynx is clear. No oropharyngeal exudate or posterior oropharyngeal erythema.   Eyes:      General: No scleral icterus.        Right eye: No discharge.         Left eye: No discharge.      Extraocular Movements: Extraocular movements intact.      Conjunctiva/sclera: Conjunctivae normal.      Pupils: Pupils are equal, round, and reactive to light.   Neck:      Vascular: No carotid bruit.      Comments: JVD not elevated  Cardiovascular:      Rate and Rhythm: Normal rate and regular rhythm.      Pulses: Normal pulses.      Heart sounds: No murmur heard.  No friction rub. No gallop.    Pulmonary:      Effort: Pulmonary effort is normal. No respiratory distress.      Breath sounds: No stridor. No wheezing, rhonchi or rales.      Comments: Decreased " BS  Kyphosis  Increased AP chest  Chest:      Chest wall: No tenderness.   Abdominal:      General: Bowel sounds are normal. There is no distension.      Tenderness: There is no abdominal tenderness. There is no guarding.   Musculoskeletal:         General: No swelling. Normal range of motion.      Cervical back: Normal range of motion and neck supple. No rigidity or tenderness.      Right lower leg: Edema present.      Left lower leg: Edema present.      Comments: #+ with some extension into thighs   Lymphadenopathy:      Cervical: No cervical adenopathy.   Skin:     General: Skin is warm and dry.      Capillary Refill: Capillary refill takes less than 2 seconds.      Coloration: Skin is not jaundiced.      Findings: Erythema present. No bruising.      Comments: Chronic stasis changes   Neurological:      General: No focal deficit present.      Mental Status: She is alert and oriented to person, place, and time. Mental status is at baseline.      Cranial Nerves: No cranial nerve deficit.      Sensory: No sensory deficit.      Motor: No weakness.   Psychiatric:         Mood and Affect: Mood normal.         Behavior: Behavior normal.         Thought Content: Thought content normal.         Judgment: Judgment normal.         Laboratory    Recent Labs   Lab 02/11/22  1029   WBC 8.82   RBC 3.90*   HGB 11.0*   HCT 36.4*      MCV 93   MCH 28.2   MCHC 30.2*       Recent Labs   Lab 02/11/22  0312   CALCIUM 9.0   PROT 5.8*      K 4.9   CO2 30*      BUN 32*   CREATININE 1.6*   ALKPHOS 46*   ALT 14   AST 29   BILITOT 0.6       No results for input(s): PT, INR, APTT in the last 24 hours.    Recent Labs   Lab 02/10/22  1806   TROPONINI 0.278*       Additional labs: reviewed    Microbiology:       Microbiology Results (last 7 days)     ** No results found for the last 168 hours. **          Radiology    Cardiac catheterization  · The RPAV lesion was 100% stenosed.  · The RPDA lesion was 100% stenosed.  · The  Prox Cx to Mid Cx lesion was 80% stenosed.  · The Dist Cx lesion was 70% stenosed.  · The 1st LPL lesion was 90% stenosed.  · The Prox RCA-2 lesion was 70% stenosed.  · The Prox RCA-1 lesion was 90% stenosed.  · The Mid LAD-2 lesion was 60% stenosed.  · The estimated blood loss was <50 mL.  · There was three vessel coronary artery disease.     The procedure log was documented by Documenter: RT Neto and   verified by Cristian Benjamin MD.    Date: 2/10/2022  Time: 8:30 PM  X-Ray Chest AP Portable  Reason: respiratory distress    FINDINGS:    PA and lateral chest with comparison chest x-ray September 16, 2019 show normal cardiomediastinal silhouette. Right subclavian Port-A-Cath again noted.  Bilateral interstitial lung opacities are unchanged from prior exam. No new confluent airspace opacities. Pulmonary vasculature is normal. No acute osseous abnormality.    IMPRESSION:    Chronic interstitial lung opacities could reflect scarring or chronic interstitial lung disease.    Electronically signed by:  Pasha Lee DO  2/10/2022 4:11 PM CST Workstation: [id number]        Additional Studies    reviewed    Ventilator Information              No results for input(s): PH, PCO2, PO2, HCO3, POCSATURATED, BE in the last 72 hours.      Impression    Active Hospital Problems    Diagnosis  POA    *Acute on chronic respiratory failure [J96.20]  Yes    Allergic reaction [T78.40XA]  Yes    Hypertensive urgency [I16.0]  Yes    Hyperkalemia [E87.5]  Yes    Venous stasis [I87.8]  Yes     Chronic    Hard of hearing [H91.90]  Yes     Chronic    Primary insomnia [F51.01]  Yes    CHF (congestive heart failure) [I50.9]  Yes     Chronic    COPD/emphysema [J44.9]  Yes     Chronic    Lung nodule [R91.1]  Yes    Class 2 obesity in adult [E66.9]  Yes    CKD (chronic kidney disease), stage III [N18.30]  Yes    Diabetic peripheral vascular disease [E11.51]  Yes    Coronary artery disease, multivessel with history of  previous PCI [I25.10]  Yes    Hypertension [I10]  Yes    Gastroesophageal reflux disease without esophagitis [K21.9]  Yes      Resolved Hospital Problems   No resolved problems to display.       Plan    · Continue duoneb treatments but prn  · BiPAP if needed, O2 (usually on 2-3 LPM at home)  · No AECOPD at this time  · Cardiac management per Dr Benjamin  · On NTG drip, heparin drip  · ECHO ordered and pending  · Has pulmonary nodule which has increased in size  · Any discussion of possible surgery is complicated by her severe lung disease and the possibility that she may have a lung cancer  · Possible transfer to [facility] today (being worked on)    Thank you for this consult.  I will follow with you while the patient is hospitalized.        Dereje Saul MD  [facility] Pulmonary/Critical Care  02/11/2022

## 2022-02-11 NOTE — PLAN OF CARE
02/11/22 1511   Final Note   Assessment Type Final Discharge Note   Anticipated Discharge Disposition Short Term   What phone number can be called within the next 1-3 days to see how you are doing after discharge? [phone]   Post-Acute Status   Discharge Delays (!) Ambulance Transport/Facility Transport  (Ambulance Transport)       Patient transferred to [facility] via ambulance.

## 2022-02-11 NOTE — PROGRESS NOTES
Asked to evaluate the pain experiencing severe substernal chest pain  She has completed angiographic assessment yesterday noted to have severe multivessel coronary disease.  Patient was having severe chest pain tears in her eyes.Rating 10/10 and scared. EKG Changes noted HR up to 125 beats per minute and elevated blood pressure BP   Ischemic EKG changes noted in the inferolateral leads .   Ordered the following:-    -sublingual nitroglycerin x2 doses  -Morphine 2 mg x1 now  -Increased Nitro gtt to 70 mcgs  - Lopressor IV Given 5 mg x1  -Start Heparin gtt  -Increase Metoprolol PO Dosing to 50 mg daily  Patient Pain was improved.   -Dr. Langley and myself at the bedside.  -Dr. Benjamin also informed and came to bedside after the event.     He will make some calls to see if he can get her to [facility] for interventional cardiology and Hi Risk PCI    See his progress note (Dr. Benjamin) for further plans   Revascularization options being explored      Patient doing better at the time of us leaving. ST changes improved. HR and BP improved.

## 2022-02-11 NOTE — NURSING
0315 - pt up to BSC to void    0325 - Back in bed, repositioned, c/o SOB, CP 7/10. Tridil increased to 15 mcg/min    0330 - CP decreased to 4/10, pt appears better; Tridil titrated up to 25 mcg/min    0334 - EKG obtained    0340 - CP improved to 2/10, per pt, continually getting better; Tridil titrated up to 30 mcg/min    0350 - Dr Jj notified of CP episode. No new orders at this time    0400 - Pt sleeping soundly, resp even and unlabored; no distress

## 2022-02-11 NOTE — CONSULTS
[facility] [place]  Interventional Cardiology Consult      Marisol Kerr  YOB: 1945  Medical Record Number:  [id number]  Attending Physician:  Karl Ontiveros MD   Date of Admission: 2/11/2022       Hospital Day:  0  Current Principal Problem:  <principal problem not specified>      History     Cc: chest pain     HPI  Ms Marisol Kerr is a 76 year old female with PMH of DM2, HTN, HLD, CKD, COPD on home oxygen and CAD with previous PCI who presents to [facility] as a transfer from [facility]. She had apparently been having increasing anginal symptoms as an outpatient and noted increased need for nitroglycerin. She presented there yesterday for an outpatient left heart cath and was found to have 3 vessel disease. No intervention was performed, plan was for outpatient follow up with CT surgery for CABG evaluation.     While in observation after the procedure she developed substernal chest pain/pressure. A troponin was checked and elevated at 0.2. An EKG was also obtained and and noted to have lateral precordial ST depressions. There was difficulty controlling her chest pain and she was ultimately placed on heparin and nitroglycerin gtt. She has had waxing/waning of her pain overnight and ultimately decision was made to transfer to [facility] for consideration of high risk PCI. She remains on nitro and heparin as well as aspirin, statin, beta blocker. She has been intermittently hypertensive with blood pressure as high as 200s systolics. Currently she is chest pain free on nitro gtt. Denies dyspnea worse than baseline. She does report some worsening lower extremity edema over the past few weeks, and has been sleeping in a recliner at home.     Additionally she has a contrast allergy and developed a rash following her procedure yesterday. Uncertain if generalized exanthem vs urticaria.     Medications - Outpatient  Prior to Admission medications    Medication Sig Start Date End Date Taking?  Authorizing Provider   acetaminophen (TYLENOL) 500 MG tablet Take 500 mg by mouth every 6 (six) hours as needed for Pain.     Historical Provider   albuterol (VENTOLIN HFA) 90 mcg/actuation inhaler Inhale 2 puffs into the lungs every 6 (six) hours as needed for Wheezing or Shortness of Breath. Rescue 1/27/22   Antonieta Marquez NP   amLODIPine (NORVASC) 5 MG tablet Take 1 tablet (5 mg total) by mouth once daily. 2/10/22 2/10/23  Cristian Benjamin MD   aspirin (ECOTRIN) 81 MG EC tablet Take 81 mg by mouth every morning.     Historical Provider   benazepriL (LOTENSIN) 40 MG tablet Take 1 tablet (40 mg total) by mouth once daily. 1/6/22   Cristian Benjamin MD   bromfenac (PROLENSA) 0.07 % Drop Place 1 drop into both eyes daily as needed.    Historical Provider   coenzyme Q10 100 mg capsule Take 100 mg by mouth every morning.    Historical Provider   dextran 70-hypromellose 0.1-0.3 % Drop Place 1 drop into both eyes daily as needed.    Historical Provider   ergocalciferol (VITAMIN D2) 50,000 unit Cap Take 1 capsule (50,000 Units total) by mouth every 7 days. 1/10/22   Antonieta Marquez NP   fish oil-omega-3 fatty acids 300-1,000 mg capsule Take 1 capsule by mouth every morning.    Historical Provider   FLAXSEED OIL ORAL Take 1,200 mg by mouth every morning.    Historical Provider   fluticasone-salmeterol diskus inhaler 250-50 mcg Inhale 1 puff into the lungs 2 (two) times daily. 4/29/21   Antonieta Marquez NP   furosemide (LASIX) 20 MG tablet Take 1 tablet (20 mg total) by mouth every other day. 1/27/22   Antonieta Marquez NP   ipratropium-albuteroL (COMBIVENT RESPIMAT)  mcg/actuation inhaler Inhale 2 puffs into the lungs every 4 (four) hours as needed for Shortness of Breath. Rescue 4/29/21   Antonieta Marquez NP   lovastatin (MEVACOR) 40 MG tablet Take 0.5 tablets (20 mg total) by mouth every evening.  Patient taking differently: Take 40 mg by mouth every other day. 12/27/21 12/27/22  Khadar Burt,  MD   metoprolol succinate (TOPROL-XL) 50 MG 24 hr tablet Take 0.5 tablets (25 mg total) by mouth once daily. 1/27/22   Antonieta Marquez NP   NARCAN 4 mg/actuation Spry  2/1/21   Historical Provider   nitroGLYCERIN 0.2 mg/hr TD PT24 (NITRODUR) 0.2 mg/hr APPLY 1 PATCH TOPICALLY ONCE DAILY TO LEG. 12/31/20   Cristian Benjamin MD   nitroGLYCERIN 0.4 MG/DOSE TL SPRY (NITROLINGUAL) 400 mcg/spray spray USE ONE SPRAY UNDER THE TONGUE EVERY 5 MINUTES AS NEEDED FOR CHEST PAIN.  DO NOT EXCEED A TOTAL OF 3 SPRAYS IN 15 MINUTES 12/8/21   Laura Louis NP   ondansetron (ZOFRAN-ODT) 4 MG TbDL Take 2 tablets (8 mg total) by mouth every 6 (six) hours as needed. 1/10/22   Antonieta Marquez NP   pantoprazole (PROTONIX) 40 MG tablet Take 1 tablet (40 mg total) by mouth once daily. 7/12/21 7/12/22  Antonieta Marquez NP   PNV NO.115/IRON FUMARATE/FA (PRENATAL #115-IRON-FOLIC ACID ORAL) Take 1 tablet by mouth every morning.     Historical Provider   predniSONE (DELTASONE) 20 MG tablet Take 1 tablet (20 mg total) by mouth As instructed. Take 3 tabs the night before procedure & 3 tabs morning of procedure 2/9/22   Cristian Benjamin MD   temazepam (RESTORIL) 15 mg Cap Take 1 capsule (15 mg total) by mouth every evening. 1/27/22   Antonieta Marquez NP   triamcinolone acetonide 0.1% (KENALOG) 0.1 % cream Apply topically 2 (two) times daily. For legs. 7/23/21   Khadar Dwyer MD   umeclidinium (INCRUSE ELLIPTA) 62.5 mcg/actuation inhalation capsule Inhale 1 capsule (63 mcg total) into the lungs every morning. Controller 4/29/21   Antonieta Marquez NP   vit C/E/Zn/coppr/lutein/zeaxan (PRESERVISION AREDS-2 ORAL) Take 1 capsule by mouth every morning.    Historical Provider   COVID-19 vacc,mRNA,Moderna,/PF (MODERNA COVID-19 VACCINE, EUA, IM) ADMINISTER 0.5ML IN THE MUSCLE AS DIRECTED 1/13/21 2/11/22  Historical Provider         Medications - Current  Scheduled Meds:   albuterol-ipratropium  3 mL Nebulization Q6H WAKE     [START ON 2/12/2022] amLODIPine  10 mg Oral Daily    [START ON 2/12/2022] aspirin  81 mg Oral QAM    [START ON 2/12/2022] atorvastatin  80 mg Oral Daily    [START ON 2/12/2022] fluticasone furoate-vilanteroL  1 puff Inhalation Daily    [START ON 2/12/2022] metoprolol succinate  25 mg Oral Daily    [START ON 2/12/2022] pantoprazole  40 mg Oral Daily    [START ON 2/12/2022] tiotropium bromide  2 puff Inhalation Daily     Continuous Infusions:   nitroGLYCERIN 70 mcg/min (02/11/22 8997)     PRN Meds:.acetaminophen, HYDROcodone-acetaminophen, melatonin, ondansetron, ondansetron, sodium chloride 0.9%      Allergies  Review of patient's allergies indicates:   Allergen Reactions    Iodinated contrast media     Strawberries [strawberry] Hives and Itching         Past Medical History  Past Medical History:   Diagnosis Date    CAD (coronary artery disease)     Cancer     colon    CHF (congestive heart failure)     Colitis     Colon cancer     COPD (chronic obstructive pulmonary disease)     Decreased hearing     Diabetes mellitus     Diabetes mellitus, type 2     Hypercholesterolemia     Hypertension     Hypoxemia     Insomnia     Obesity     Osteoarthritis          Past Surgical History  Past Surgical History:   Procedure Laterality Date    ANGIOGRAM, CORONARY, WITH LEFT HEART CATHETERIZATION N/A 2/10/2022    Procedure: Angiogram, Coronary, with Left Heart Cath;  Surgeon: Cristian Benjamin MD;  Location[facility] CATH/EP LAB;  Service: Cardiology;  Laterality: N/A;    CARDIAC CATHETERIZATION      CHOLECYSTECTOMY      COLECTOMY      CORONARY ANGIOPLASTY      CORONARY STENT PLACEMENT      EXTERNAL EAR SURGERY      EYE SURGERY      FLEXIBLE SIGMOIDOSCOPY N/A 9/19/2019    Procedure: SIGMOIDOSCOPY, FLEXIBLE;  Surgeon: Biju Kramer III, MD;  Location: [facility] ENDO;  Service: Endoscopy;  Laterality: N/A;    HYSTERECTOMY      partial         Social History  Social History     Socioeconomic History     Marital status:    Tobacco Use    Smoking status: Former Smoker     Packs/day: 3.00     Years: 50.00     Pack years: 150.00     Types: Cigarettes     Start date: 3/30/1964     Quit date: 2/28/2006     Years since quitting: 15.9    Smokeless tobacco: Never Used    Tobacco comment: ex smoker 15 years   Substance and Sexual Activity    Alcohol use: Yes    Drug use: No    Sexual activity: Never         ROS  10 point ROS performed and negative except as stated in HPI     Physical Examination         Vital Signs  Vitals  Temp: 98.4 °F (36.9 °C)  Temp src: Oral  Pulse: 69  Heart Rate Source: Monitor,Continuous  Resp: (!) 29  SpO2: 100 %  Pulse Oximetry Type: Continuous  Flow (L/min): 4  O2 Device (Oxygen Therapy): nasal cannula  BP: (!) 150/71  MAP (mmHg): 99  BP Location: Left arm  BP Method: Automatic  Patient Position: Lying          24 Hour VS Range    Temp:  [97.9 °F (36.6 °C)-99.1 °F (37.3 °C)]   Pulse:  []   Resp:  [14-47]   BP: (112-209)/()   SpO2:  [92 %-100 %]   No intake or output data in the 24 hours ending 02/11/22 1555      Physical Exam:   Constitutional: no acute distress  HEENT: NCAT, EOMI, no scleral icterus  Cardiovascular: Regular rate and rhythm, no murmurs appreciated. 2+ carotid, radial, femoral pulses bilaterally    Pulmonary: Clear to auscultation anteriorly   Abdomen: nontender, non-distended   Neuro: alert and oriented, no focal deficits  Extremities: warm, bilateral lower extremity pitting edema   MSK: no deformities  Integument: intact, no rashes       Data       Recent Labs   Lab 02/10/22  0538 02/11/22  0312 02/11/22  1029   WBC 6.67 9.48 8.82   HGB 13.2 10.9* 11.0*   HCT 43.8 35.7* 36.4*    161 167        Recent Labs   Lab 02/10/22  0538 02/11/22  1029   INR 1.0 1.1        Recent Labs   Lab 02/10/22  0538 02/10/22  1806 02/11/22  0312    141 140   K 5.2* 4.9 4.9    101 103   CO2 28 29 30*   BUN 30* 29* 32*   CREATININE 1.6* 1.4 1.6*   ANIONGAP  11 11 7*   CALCIUM 9.3 9.4 9.0        Recent Labs   Lab 02/10/22  1806 02/11/22  0312   PROT 6.6 5.8*   ALBUMIN 3.6 3.3*   BILITOT 0.5 0.6   ALKPHOS 55 46*   AST 22 29   ALT 15 14        Recent Labs   Lab 02/10/22  1806 02/11/22  1518   TROPONINI 0.278* 3.829*        BNP (pg/mL)   Date Value   02/10/2022 580 (H)   09/16/2019 103 (H)   09/26/2017 66   10/27/2016 171 (H)       No results for input(s): LABBLOO in the last 168 hours.       ECG baseline: NSR with lateral precordial and some inferior biphasic T waves     ECG 2/10 following cath: Sinus tach with lateral precordial and inferior ST depressions, T wave inversions         Assessment & Plan   76 year old female with PMH of DM2, HTN, HLD, CKD, COPD on home oxygen and CAD with previous PCI who [place] [facility] [place] [facility] for NSTEMI.     NSTEMI:   Cath films reviewed and has multi vessel disease which may be amenable to revascularization. Would place on ACS therapy and consult CT surgery for consideration of CABG, though given her severe COPD and possible lung malignancy she is unlikely to be a good surgical candidate. If declined by CTS would load with plavix and plan for PCI on Monday as long as she remains chest pain free. Otherwise continue current therapy, increase beta blocker. Start pretreatment for contrast allergy. Follow up echocardiogram.   -Continue aspirin, heparin, nitroglycerin gtt, HI statin   -Titrate nitro to remain chest pain free  -Increase metoprolol succinate to 50 mg daily  -Consult CT surgery  -Start prednisone 50 mg q8 hours, famotidine 20 mg q8 hours, diphenhydramine 50 mg q8 hours in anticipation of [facility]  -Call interventional cardiology if chest pain recurs and is refractory to medical therapy       Case discussed with Dr. Chappell.     Shaan Mccullough MD  [facility]   Cardiovascular Disease PGY-IV

## 2022-02-11 NOTE — PLAN OF CARE
Pt has done well throughout the shift. Eager to be educated, wants to get better, very involved in her care.

## 2022-02-11 NOTE — PLAN OF CARE
[facility] - Cardiac Intensive Care  Initial Discharge Assessment       Primary Care Provider: Khadar Dwyer MD    Admission Diagnosis: Unstable angina [I20.0]    Admission Date: 2/11/2022  Expected Discharge Date:      Assessment completed per health record review. Patient was transferred to [facility] before discharge planning assessment could be completed.    Discharge Barriers Identified: None    Payor: MEDICARE / Plan: MEDICARE PART A & B / Product Type: Government /     Extended Emergency Contact Information  Primary Emergency Contact: Marisol Kerr ALESIA  Address: [address] [place] [address]            [place] [address] [place]  Mobile Phone: [phone]  Relation: Daughter  Preferred language: English   needed? No    Discharge Plan A: Other ([facility])  Discharge Plan B: Other ([facility])      [facility] [place] - [address]  [address] [place] [address]  Phone: [phone] Fax: [phone]    [address] [address]  Phone: [phone] Fax: [phone]      Initial Assessment (most recent)     Adult Discharge Assessment - 02/11/22 1428        Discharge Assessment    Assessment Type Discharge Planning Assessment     Source of Information health record     When was your last doctors appointment? 01/27/22   Antonieta Marquez NP    Reason For Admission Acute on chronic respiratory failure     Lives With alone     Do you expect to return to your current living situation? Yes     Prior to hospitilization cognitive status: Alert/Oriented     Current cognitive status: Alert/Oriented     Walking or Climbing Stairs Difficulty none;ambulation difficulty, requires equipment;transferring difficulty, requires equipment     Dressing/Bathing Difficulty bathing difficulty, requires equipment     Readmission within 30 days? No     Do you take prescription medications? Yes     Do you have prescription coverage? Yes      Coverage Medicaid     Do you have any problems affording any of your prescribed medications? No     Are you on dialysis? No     Do you take coumadin? No     Discharge Plan A Other   [facility]    Discharge Plan B Other   [facility]    Discharge Barriers Identified None

## 2022-02-11 NOTE — CARE UPDATE
02/10/22 1936   Patient Assessment/Suction   Respiratory Effort Unlabored   All Lung Fields Breath Sounds diminished   Rhythm/Pattern, Respiratory pattern regular   PRE-TX-O2   O2 Device (Oxygen Therapy) nasal cannula  (on standby)   SpO2 (!) 92 %   Pulse Oximetry Type Continuous   Pulse 84   Resp 20   Aerosol Therapy   $ Aerosol Therapy Charges Aerosol Treatment   Respiratory Treatment Status (SVN) given   Treatment Route (SVN) mouthpiece   Patient Position (SVN) HOB elevated   Post Treatment Assessment (SVN) increased aeration   Signs of Intolerance (SVN) none   Breath Sounds Post-Respiratory Treatment   Throughout All Fields Post-Treatment All Fields   Throughout All Fields Post-Treatment wheezes, expiratory   Post-treatment Heart Rate (beats/min) 82   Post-treatment Resp Rate (breaths/min) 20   Preset CPAP/BiPAP Settings   Mode Of Delivery Standby   Education   $ Education 15 min;Bronchodilator;BiPAP   Respiratory Evaluation   $ Care Plan Tech Time 15 min   $ Eval/Re-eval Charges Re-evaluation   Evaluation For New Orders

## 2022-02-11 NOTE — ASSESSMENT & PLAN NOTE
Vitals:    02/11/22 1506 02/11/22 1545 02/11/22 1605 02/11/22 1705   BP:   (!) 104/56 (!) 142/62   BP Location:   Left arm Left arm   Patient Position:   Lying Lying   Pulse:   70 71   Resp:   20 20   Temp:   98.9 °F (37.2 °C)    TempSrc:   Oral    SpO2:   100% 100%   Weight: 90.5 kg (199 lb 8.3 oz) 89.8 kg (197 lb 15.6 oz)       BP was hypertensive at OSH. Stable on admission after initiation of home anti-hypertensives and  nitroglycerin drip .    Home medications:  amLODIPine (NORVASC) 5 MG, benazepriL (LOTENSIN) 40 MG ,       PLAN  metoprolol succinate (TOPROL-XL) 24 hr tablet 50 mg    amLODIPine tablet 10 mg

## 2022-02-11 NOTE — ASSESSMENT & PLAN NOTE
Lab Results   Component Value Date    HDL 53 09/21/2021    LDLCALC 98.2 09/21/2021    CHOL 165 09/21/2021    TRIG 69 09/21/2021     PLAN  Atorvastatin 40mg

## 2022-02-11 NOTE — ASSESSMENT & PLAN NOTE
Chronic history of venous stasis of bilateral lower extremities limited to breakdown of skin without varicose veins  Patient denies increase in leg swelling over her baseline.    PLAN  Nursing wound care PRN  Elevate legs

## 2022-02-11 NOTE — NURSING
RN came in room for pt round to find pt flushed and breathing heavily. Pt c/o chest heaviness, being rated an 8 of 10. RN obtained a STAT EKG and gave it to cardiologist NP, Ronda Johnson, and she then gave orders for STAT Morphine and Nitro SL. Dr. LEELA Langley came in to assist. Pt then rated pain 10 out of 10 with a HR in the 130s and .  MD ordered Metoprolol 5mg, and another Nitro SL and to increase Nitrol IV from 50 to 70. After approx 15 minutes of intervention, pt rated pain a 2 about of 10. MD also started patient on a Heparin gtt with an initiate 4,000unit bolus. ERIKA Lechuga RN

## 2022-02-11 NOTE — NURSING TRANSFER
Nursing Transfer Note      2/10/2022     Reason patient is being transferred: admit to ICU    Transfer To: 2204 from 1403    Transfer via stretcher    Transfer with   O2, cardiac monitoring    Transported by     Medicines sent: Nitro drip    Any special needs or follow-up needed: assess chest pain    Chart send with patient: Yes    Notified: daughter at bedside    Patient reassessed at:02/10/2022 1700    Upon arrival to floor: cardiac monitor applied, patient oriented to room, call bell in reach and bed in lowest position   Problem: Falls - Risk of  Goal: *Absence of Falls  Description: Document Lora Bridges Fall Risk and appropriate interventions in the flowsheet.   Outcome: Progressing Towards Goal  Note: Fall Risk Interventions:            Medication Interventions: Evaluate medications/consider consulting pharmacy, Patient to call before getting OOB, Teach patient to arise slowly         History of Falls Interventions: Bed/chair exit alarm, Evaluate medications/consider consulting pharmacy, Investigate reason for fall         Problem: Cellulitis Care Plan (Adult)  Goal: *Control of acute pain  Outcome: Progressing Towards Goal  Goal: *Skin integrity maintained  Outcome: Progressing Towards Goal  Goal: *Absence of infection signs and symptoms  Outcome: Progressing Towards Goal

## 2022-02-12 LAB
ALBUMIN SERPL BCP-MCNC: 3.1 G/DL (ref 3.5–5.2)
ALP SERPL-CCNC: 61 U/L (ref 55–135)
ALT SERPL W/O P-5'-P-CCNC: 13 U/L (ref 10–44)
ANION GAP SERPL CALC-SCNC: 11 MMOL/L (ref 8–16)
ANION GAP SERPL CALC-SCNC: 9 MMOL/L (ref 8–16)
APTT BLDCRRT: 30.8 SEC (ref 21–32)
APTT BLDCRRT: 39.7 SEC (ref 21–32)
APTT BLDCRRT: 41.1 SEC (ref 21–32)
AST SERPL-CCNC: 25 U/L (ref 10–40)
BASOPHILS # BLD AUTO: 0.02 K/UL (ref 0–0.2)
BASOPHILS NFR BLD: 0.2 % (ref 0–1.9)
BILIRUB SERPL-MCNC: 0.4 MG/DL (ref 0.1–1)
BUN SERPL-MCNC: 38 MG/DL (ref 8–23)
BUN SERPL-MCNC: 39 MG/DL (ref 8–23)
CALCIUM SERPL-MCNC: 8.9 MG/DL (ref 8.7–10.5)
CALCIUM SERPL-MCNC: 9.5 MG/DL (ref 8.7–10.5)
CHLORIDE SERPL-SCNC: 102 MMOL/L (ref 95–110)
CHLORIDE SERPL-SCNC: 106 MMOL/L (ref 95–110)
CO2 SERPL-SCNC: 24 MMOL/L (ref 23–29)
CO2 SERPL-SCNC: 28 MMOL/L (ref 23–29)
CREAT SERPL-MCNC: 1.7 MG/DL (ref 0.5–1.4)
CREAT SERPL-MCNC: 1.7 MG/DL (ref 0.5–1.4)
DIFFERENTIAL METHOD: ABNORMAL
EOSINOPHIL # BLD AUTO: 0 K/UL (ref 0–0.5)
EOSINOPHIL NFR BLD: 0.1 % (ref 0–8)
ERYTHROCYTE [DISTWIDTH] IN BLOOD BY AUTOMATED COUNT: 12.8 % (ref 11.5–14.5)
EST. GFR  (AFRICAN AMERICAN): 33.8 ML/MIN/1.73 M^2
EST. GFR  (AFRICAN AMERICAN): 33.8 ML/MIN/1.73 M^2
EST. GFR  (NON AFRICAN AMERICAN): 29.3 ML/MIN/1.73 M^2
EST. GFR  (NON AFRICAN AMERICAN): 29.3 ML/MIN/1.73 M^2
GLUCOSE SERPL-MCNC: 140 MG/DL (ref 70–110)
GLUCOSE SERPL-MCNC: 154 MG/DL (ref 70–110)
HCT VFR BLD AUTO: 36.4 % (ref 37–48.5)
HGB BLD-MCNC: 10.9 G/DL (ref 12–16)
IMM GRANULOCYTES # BLD AUTO: 0.04 K/UL (ref 0–0.04)
IMM GRANULOCYTES NFR BLD AUTO: 0.4 % (ref 0–0.5)
LYMPHOCYTES # BLD AUTO: 0.8 K/UL (ref 1–4.8)
LYMPHOCYTES NFR BLD: 7.9 % (ref 18–48)
MAGNESIUM SERPL-MCNC: 2.1 MG/DL (ref 1.6–2.6)
MCH RBC QN AUTO: 28.2 PG (ref 27–31)
MCHC RBC AUTO-ENTMCNC: 29.9 G/DL (ref 32–36)
MCV RBC AUTO: 94 FL (ref 82–98)
MONOCYTES # BLD AUTO: 0.2 K/UL (ref 0.3–1)
MONOCYTES NFR BLD: 1.6 % (ref 4–15)
NEUTROPHILS # BLD AUTO: 8.7 K/UL (ref 1.8–7.7)
NEUTROPHILS NFR BLD: 89.8 % (ref 38–73)
NRBC BLD-RTO: 0 /100 WBC
PHOSPHATE SERPL-MCNC: 2.8 MG/DL (ref 2.7–4.5)
PLATELET # BLD AUTO: 146 K/UL (ref 150–450)
PMV BLD AUTO: 11.8 FL (ref 9.2–12.9)
POCT GLUCOSE: 158 MG/DL (ref 70–110)
POCT GLUCOSE: 158 MG/DL (ref 70–110)
POCT GLUCOSE: 161 MG/DL (ref 70–110)
POCT GLUCOSE: 183 MG/DL (ref 70–110)
POTASSIUM SERPL-SCNC: 4.6 MMOL/L (ref 3.5–5.1)
POTASSIUM SERPL-SCNC: 5.7 MMOL/L (ref 3.5–5.1)
PROT SERPL-MCNC: 6 G/DL (ref 6–8.4)
RBC # BLD AUTO: 3.86 M/UL (ref 4–5.4)
SODIUM SERPL-SCNC: 139 MMOL/L (ref 136–145)
SODIUM SERPL-SCNC: 141 MMOL/L (ref 136–145)
WBC # BLD AUTO: 9.64 K/UL (ref 3.9–12.7)

## 2022-02-12 PROCEDURE — 97535 SELF CARE MNGMENT TRAINING: CPT

## 2022-02-12 PROCEDURE — 97116 GAIT TRAINING THERAPY: CPT

## 2022-02-12 PROCEDURE — 99233 SBSQ HOSP IP/OBS HIGH 50: CPT | Mod: GC,,, | Performed by: INTERNAL MEDICINE

## 2022-02-12 PROCEDURE — 80048 BASIC METABOLIC PNL TOTAL CA: CPT | Performed by: STUDENT IN AN ORGANIZED HEALTH CARE EDUCATION/TRAINING PROGRAM

## 2022-02-12 PROCEDURE — 63600175 PHARM REV CODE 636 W HCPCS: Performed by: INTERNAL MEDICINE

## 2022-02-12 PROCEDURE — 97162 PT EVAL MOD COMPLEX 30 MIN: CPT

## 2022-02-12 PROCEDURE — 83735 ASSAY OF MAGNESIUM: CPT | Performed by: STUDENT IN AN ORGANIZED HEALTH CARE EDUCATION/TRAINING PROGRAM

## 2022-02-12 PROCEDURE — 25000242 PHARM REV CODE 250 ALT 637 W/ HCPCS: Performed by: STUDENT IN AN ORGANIZED HEALTH CARE EDUCATION/TRAINING PROGRAM

## 2022-02-12 PROCEDURE — 27000221 HC OXYGEN, UP TO 24 HOURS

## 2022-02-12 PROCEDURE — 94640 AIRWAY INHALATION TREATMENT: CPT

## 2022-02-12 PROCEDURE — 25000003 PHARM REV CODE 250: Performed by: STUDENT IN AN ORGANIZED HEALTH CARE EDUCATION/TRAINING PROGRAM

## 2022-02-12 PROCEDURE — 84100 ASSAY OF PHOSPHORUS: CPT | Performed by: STUDENT IN AN ORGANIZED HEALTH CARE EDUCATION/TRAINING PROGRAM

## 2022-02-12 PROCEDURE — 85730 THROMBOPLASTIN TIME PARTIAL: CPT | Performed by: INTERNAL MEDICINE

## 2022-02-12 PROCEDURE — 99233 PR SUBSEQUENT HOSPITAL CARE,LEVL III: ICD-10-PCS | Mod: ,,, | Performed by: THORACIC SURGERY (CARDIOTHORACIC VASCULAR SURGERY)

## 2022-02-12 PROCEDURE — 63600175 PHARM REV CODE 636 W HCPCS: Performed by: STUDENT IN AN ORGANIZED HEALTH CARE EDUCATION/TRAINING PROGRAM

## 2022-02-12 PROCEDURE — 99233 PR SUBSEQUENT HOSPITAL CARE,LEVL III: ICD-10-PCS | Mod: GC,,, | Performed by: INTERNAL MEDICINE

## 2022-02-12 PROCEDURE — 85025 COMPLETE CBC W/AUTO DIFF WBC: CPT | Performed by: STUDENT IN AN ORGANIZED HEALTH CARE EDUCATION/TRAINING PROGRAM

## 2022-02-12 PROCEDURE — 20000000 HC ICU ROOM

## 2022-02-12 PROCEDURE — 80053 COMPREHEN METABOLIC PANEL: CPT | Performed by: STUDENT IN AN ORGANIZED HEALTH CARE EDUCATION/TRAINING PROGRAM

## 2022-02-12 PROCEDURE — 99900035 HC TECH TIME PER 15 MIN (STAT)

## 2022-02-12 PROCEDURE — 99233 SBSQ HOSP IP/OBS HIGH 50: CPT | Mod: ,,, | Performed by: THORACIC SURGERY (CARDIOTHORACIC VASCULAR SURGERY)

## 2022-02-12 PROCEDURE — 25000003 PHARM REV CODE 250: Performed by: INTERNAL MEDICINE

## 2022-02-12 PROCEDURE — 94761 N-INVAS EAR/PLS OXIMETRY MLT: CPT

## 2022-02-12 PROCEDURE — 97165 OT EVAL LOW COMPLEX 30 MIN: CPT

## 2022-02-12 RX ORDER — NAPROXEN SODIUM 220 MG/1
81 TABLET, FILM COATED ORAL DAILY
Status: DISCONTINUED | OUTPATIENT
Start: 2022-02-12 | End: 2022-02-15

## 2022-02-12 RX ORDER — FUROSEMIDE 10 MG/ML
20 INJECTION INTRAMUSCULAR; INTRAVENOUS ONCE
Status: COMPLETED | OUTPATIENT
Start: 2022-02-12 | End: 2022-02-12

## 2022-02-12 RX ORDER — FAMOTIDINE 20 MG/1
20 TABLET, FILM COATED ORAL DAILY
Status: DISCONTINUED | OUTPATIENT
Start: 2022-02-13 | End: 2022-02-13

## 2022-02-12 RX ORDER — ATORVASTATIN CALCIUM 20 MG/1
80 TABLET, FILM COATED ORAL NIGHTLY
Status: DISCONTINUED | OUTPATIENT
Start: 2022-02-12 | End: 2022-02-21 | Stop reason: HOSPADM

## 2022-02-12 RX ADMIN — PREDNISONE 50 MG: 20 TABLET ORAL at 06:02

## 2022-02-12 RX ADMIN — TICAGRELOR 180 MG: 90 TABLET ORAL at 01:02

## 2022-02-12 RX ADMIN — ASPIRIN 81 MG CHEWABLE TABLET 81 MG: 81 TABLET CHEWABLE at 08:02

## 2022-02-12 RX ADMIN — PREDNISONE 50 MG: 20 TABLET ORAL at 09:02

## 2022-02-12 RX ADMIN — METOPROLOL SUCCINATE 50 MG: 50 TABLET, EXTENDED RELEASE ORAL at 08:02

## 2022-02-12 RX ADMIN — AMLODIPINE BESYLATE 10 MG: 10 TABLET ORAL at 08:02

## 2022-02-12 RX ADMIN — FAMOTIDINE 20 MG: 20 TABLET, FILM COATED ORAL at 06:02

## 2022-02-12 RX ADMIN — HEPARIN SODIUM 15 UNITS/KG/HR: 5000 INJECTION INTRAVENOUS; SUBCUTANEOUS at 03:02

## 2022-02-12 RX ADMIN — DIPHENHYDRAMINE HYDROCHLORIDE 50 MG: 50 CAPSULE ORAL at 06:02

## 2022-02-12 RX ADMIN — PREDNISONE 50 MG: 20 TABLET ORAL at 01:02

## 2022-02-12 RX ADMIN — DIPHENHYDRAMINE HYDROCHLORIDE 50 MG: 50 CAPSULE ORAL at 09:02

## 2022-02-12 RX ADMIN — FAMOTIDINE 20 MG: 20 TABLET, FILM COATED ORAL at 01:02

## 2022-02-12 RX ADMIN — FLUTICASONE FUROATE AND VILANTEROL TRIFENATATE 1 PUFF: 100; 25 POWDER RESPIRATORY (INHALATION) at 08:02

## 2022-02-12 RX ADMIN — ATORVASTATIN CALCIUM 80 MG: 20 TABLET, FILM COATED ORAL at 08:02

## 2022-02-12 RX ADMIN — IPRATROPIUM BROMIDE AND ALBUTEROL SULFATE 3 ML: 2.5; .5 SOLUTION RESPIRATORY (INHALATION) at 08:02

## 2022-02-12 RX ADMIN — NITROGLYCERIN 35 MCG/MIN: 20 INJECTION INTRAVENOUS at 06:02

## 2022-02-12 RX ADMIN — FUROSEMIDE 20 MG: 10 INJECTION, SOLUTION INTRAVENOUS at 08:02

## 2022-02-12 RX ADMIN — PANTOPRAZOLE SODIUM 40 MG: 40 TABLET, DELAYED RELEASE ORAL at 08:02

## 2022-02-12 RX ADMIN — TIOTROPIUM BROMIDE INHALATION SPRAY 2 PUFF: 3.12 SPRAY, METERED RESPIRATORY (INHALATION) at 08:02

## 2022-02-12 RX ADMIN — DIPHENHYDRAMINE HYDROCHLORIDE 50 MG: 50 CAPSULE ORAL at 01:02

## 2022-02-12 RX ADMIN — IPRATROPIUM BROMIDE AND ALBUTEROL SULFATE 3 ML: 2.5; .5 SOLUTION RESPIRATORY (INHALATION) at 07:02

## 2022-02-12 RX ADMIN — IPRATROPIUM BROMIDE AND ALBUTEROL SULFATE 3 ML: 2.5; .5 SOLUTION RESPIRATORY (INHALATION) at 01:02

## 2022-02-12 RX ADMIN — CALCIUM GLUCONATE 1 G: 98 INJECTION, SOLUTION INTRAVENOUS at 08:02

## 2022-02-12 NOTE — HOSPITAL COURSE
Patient was admitted after left heart catheterization which demonstrated severe triple-vessel disease, see report below.  Developed worsening shortness of breath with chest pain, hypertensive urgency and concern for allergic reaction, known history of contrast allergy.  She was started on medications for potential allergic reaction, supplemental oxygen with p.r.n. BiPAP, nitroglycerin drip for chest pain/hypertensive urgency and admitted to the ICU.  Cardiology and Pulmonary were consulted.  Overnight patient with ongoing intermittent chest pain requiring up titration of nitroglycerin.  In the morning severe episode of chest pain, received medical management, heparin infusion started, beta-blocker, morphine, troponin increased 3, consistent with NSTEMI, echo with EF of 60 with grade 2 diastolic dysfunction.  Given patient's severe COPD with comorbidities, high risk for CABG, cardiology recommended transfer to [facility] for high risk PCI and patient was accepted, transferred once bed was available.  Discussed with Cardiology on day of transfer.  Discussed with patient and granddaughter at bedside.  Discussed with ICU RN.    Discharge examination  Sitting in bed, alert, on supplemental oxygen, regular heart rhythm, erythema lower extremity with induration and edema    C  Summary       The RPAV lesion was 100% stenosed.  The RPDA lesion was 100% stenosed.  The Prox Cx to Mid Cx lesion was 80% stenosed.  The Dist Cx lesion was 70% stenosed.  The 1st LPL lesion was 90% stenosed.  The Prox RCA-2 lesion was 70% stenosed.  The Prox RCA-1 lesion was 90% stenosed.  The Mid LAD-2 lesion was 60% stenosed.  The estimated blood loss was <50 mL.  There was three vessel coronary artery disease.

## 2022-02-12 NOTE — SUBJECTIVE & OBJECTIVE
Past Medical History:   Diagnosis Date    CAD (coronary artery disease)     Cancer     colon    CHF (congestive heart failure)     Colitis     Colon cancer     COPD (chronic obstructive pulmonary disease)     Decreased hearing     Diabetes mellitus     Diabetes mellitus, type 2     Hypercholesterolemia     Hypertension     Hypoxemia     Insomnia     Obesity     Osteoarthritis        Past Surgical History:   Procedure Laterality Date    ANGIOGRAM, CORONARY, WITH LEFT HEART CATHETERIZATION N/A 2/10/2022    Procedure: Angiogram, Coronary, with Left Heart Cath;  Surgeon: Cristian Benjamin MD;  Location: [facility] CATH/EP LAB;  Service: Cardiology;  Laterality: N/A;    CARDIAC CATHETERIZATION      CHOLECYSTECTOMY      COLECTOMY      CORONARY ANGIOPLASTY      CORONARY STENT PLACEMENT      EXTERNAL EAR SURGERY      EYE SURGERY      FLEXIBLE SIGMOIDOSCOPY N/A 9/19/2019    Procedure: SIGMOIDOSCOPY, FLEXIBLE;  Surgeon: Biju Kramer III, MD;  Location: [facility] ENDO;  Service: Endoscopy;  Laterality: N/A;    HYSTERECTOMY      partial       Review of patient's allergies indicates:   Allergen Reactions    Iodinated contrast media     Strawberries [strawberry] Hives and Itching       Current Facility-Administered Medications on File Prior to Encounter   Medication    [COMPLETED] metoprolol succinate (TOPROL-XL) 24 hr tablet 25 mg    [COMPLETED] morphine injection 2 mg    [COMPLETED] nitroGLYCERIN (NITROSTAT) 0.4 MG SL tablet    [DISCONTINUED] 0.45% NaCl infusion    [DISCONTINUED] acetaminophen tablet 650 mg    [DISCONTINUED] albuterol inhaler 2 puff    [DISCONTINUED] albuterol-ipratropium 2.5 mg-0.5 mg/3 mL nebulizer solution 3 mL    [DISCONTINUED] albuterol-ipratropium 2.5 mg-0.5 mg/3 mL nebulizer solution 3 mL    [DISCONTINUED] amLODIPine tablet 5 mg    [DISCONTINUED] aspirin EC tablet 81 mg    [DISCONTINUED] atorvastatin tablet 40 mg    [DISCONTINUED] dextrose 50% injection 12.5 g     [DISCONTINUED] dextrose 50% injection 12.5 g    [DISCONTINUED] dextrose 50% injection 25 g    [DISCONTINUED] dextrose 50% injection 25 g    [DISCONTINUED] diphenhydrAMINE injection 12.5 mg    [DISCONTINUED] enoxaparin injection 40 mg    [DISCONTINUED] famotidine (PF) injection 20 mg    [DISCONTINUED] glucagon (human recombinant) injection 1 mg    [DISCONTINUED] glucose chewable tablet 16 g    [DISCONTINUED] glucose chewable tablet 24 g    [DISCONTINUED] heparin 25,000 units in dextrose 5% (100 units/ml) IV bolus from bag - ADDITIONAL PRN BOLUS - 30 units/kg (max bolus 4000 units)    [DISCONTINUED] heparin 25,000 units in dextrose 5% (100 units/ml) IV bolus from bag - ADDITIONAL PRN BOLUS - 60 units/kg (max bolus 4000 units)    [DISCONTINUED] heparin 25,000 units in dextrose 5% (100 units/ml) IV bolus from bag INITIAL BOLUS (max bolus 4000 units)    [DISCONTINUED] heparin 25,000 units in dextrose 5% 250 mL (100 units/mL) infusion LOW INTENSITY nomogram - [facility]    [DISCONTINUED] hydrALAZINE injection 10 mg    [DISCONTINUED] hydrocortisone sodium succinate injection 60 mg    [DISCONTINUED] insulin regular injection 1-12 Units    [DISCONTINUED] lisinopriL tablet 40 mg    [DISCONTINUED] melatonin tablet 6 mg    [DISCONTINUED] metoprolol injection 5 mg    [DISCONTINUED] metoprolol succinate (TOPROL-XL) 24 hr tablet 25 mg    [DISCONTINUED] metoprolol succinate (TOPROL-XL) 24 hr tablet 50 mg    [DISCONTINUED] naloxone 0.4 mg/mL injection 0.02 mg    [DISCONTINUED] nitroGLYCERIN 50 mg in dextrose 5 % 250 mL infusion (TITRATING)    [DISCONTINUED] nitroGLYCERIN 50 mg/250 mL (200 mcg/mL) infusion    [DISCONTINUED] nitroGLYCERIN SL tablet 0.4 mg    [DISCONTINUED] ondansetron disintegrating tablet 8 mg    [DISCONTINUED] ondansetron injection 4 mg    [DISCONTINUED] pantoprazole EC tablet 40 mg    [DISCONTINUED] senna-docusate 8.6-50 mg per tablet 2 tablet    [DISCONTINUED] sodium chloride 0.9% flush 10 mL     [DISCONTINUED] zolpidem tablet 5 mg     Current Outpatient Medications on File Prior to Encounter   Medication Sig    acetaminophen (TYLENOL) 500 MG tablet Take 500 mg by mouth every 6 (six) hours as needed for Pain.     albuterol (VENTOLIN HFA) 90 mcg/actuation inhaler Inhale 2 puffs into the lungs every 6 (six) hours as needed for Wheezing or Shortness of Breath. Rescue    amLODIPine (NORVASC) 5 MG tablet Take 1 tablet (5 mg total) by mouth once daily.    aspirin (ECOTRIN) 81 MG EC tablet Take 81 mg by mouth every morning.     benazepriL (LOTENSIN) 40 MG tablet Take 1 tablet (40 mg total) by mouth once daily.    bromfenac (PROLENSA) 0.07 % Drop Place 1 drop into both eyes daily as needed.    coenzyme Q10 100 mg capsule Take 100 mg by mouth every morning.    dextran 70-hypromellose 0.1-0.3 % Drop Place 1 drop into both eyes daily as needed.    ergocalciferol (VITAMIN D2) 50,000 unit Cap Take 1 capsule (50,000 Units total) by mouth every 7 days.    fish oil-omega-3 fatty acids 300-1,000 mg capsule Take 1 capsule by mouth every morning.    FLAXSEED OIL ORAL Take 1,200 mg by mouth every morning.    fluticasone-salmeterol diskus inhaler 250-50 mcg Inhale 1 puff into the lungs 2 (two) times daily.    furosemide (LASIX) 20 MG tablet Take 1 tablet (20 mg total) by mouth every other day.    ipratropium-albuteroL (COMBIVENT RESPIMAT)  mcg/actuation inhaler Inhale 2 puffs into the lungs every 4 (four) hours as needed for Shortness of Breath. Rescue    lovastatin (MEVACOR) 40 MG tablet Take 0.5 tablets (20 mg total) by mouth every evening. (Patient taking differently: Take 40 mg by mouth every other day.)    metoprolol succinate (TOPROL-XL) 50 MG 24 hr tablet Take 0.5 tablets (25 mg total) by mouth once daily.    NARCAN 4 mg/actuation Spry     nitroGLYCERIN 0.2 mg/hr TD PT24 (NITRODUR) 0.2 mg/hr APPLY 1 PATCH TOPICALLY ONCE DAILY TO LEG.    nitroGLYCERIN 0.4 MG/DOSE TL SPRY (NITROLINGUAL) 400  mcg/spray spray USE ONE SPRAY UNDER THE TONGUE EVERY 5 MINUTES AS NEEDED FOR CHEST PAIN.  DO NOT EXCEED A TOTAL OF 3 SPRAYS IN 15 MINUTES    ondansetron (ZOFRAN-ODT) 4 MG TbDL Take 2 tablets (8 mg total) by mouth every 6 (six) hours as needed.    pantoprazole (PROTONIX) 40 MG tablet Take 1 tablet (40 mg total) by mouth once daily.    PNV NO.115/IRON FUMARATE/FA (PRENATAL #115-IRON-FOLIC ACID ORAL) Take 1 tablet by mouth every morning.     predniSONE (DELTASONE) 20 MG tablet Take 1 tablet (20 mg total) by mouth As instructed. Take 3 tabs the night before procedure & 3 tabs morning of procedure    temazepam (RESTORIL) 15 mg Cap Take 1 capsule (15 mg total) by mouth every evening.    triamcinolone acetonide 0.1% (KENALOG) 0.1 % cream Apply topically 2 (two) times daily. For legs.    umeclidinium (INCRUSE ELLIPTA) 62.5 mcg/actuation inhalation capsule Inhale 1 capsule (63 mcg total) into the lungs every morning. Controller    vit C/E/Zn/coppr/lutein/zeaxan (PRESERVISION AREDS-2 ORAL) Take 1 capsule by mouth every morning.    [DISCONTINUED] COVID-19 vacc,mRNA,Moderna,/PF (MODERNA COVID-19 VACCINE, EUA, IM) ADMINISTER 0.5ML IN THE MUSCLE AS DIRECTED     Family History     Problem Relation (Age of Onset)    Febrile seizures Mother    Heart failure Father        Tobacco Use    Smoking status: Former Smoker     Packs/day: 3.00     Years: 50.00     Pack years: 150.00     Types: Cigarettes     Start date: 3/30/1964     Quit date: 2/28/2006     Years since quitting: 15.9    Smokeless tobacco: Never Used    Tobacco comment: ex smoker 15 years   Substance and Sexual Activity    Alcohol use: Yes    Drug use: No    Sexual activity: Never     Review of Systems   Constitutional: Positive for malaise/fatigue. Negative for decreased appetite, fever, weight gain and weight loss.   HENT: Negative for congestion and sore throat.    Eyes: Negative for blurred vision, vision loss in left eye, vision loss in right eye and  visual disturbance.   Cardiovascular: Positive for leg swelling. Negative for chest pain, dyspnea on exertion and palpitations.   Respiratory: Negative for cough and shortness of breath (stable on nasal cannula').    Skin: Negative for flushing and rash.   Gastrointestinal: Negative for abdominal pain, constipation, diarrhea, heartburn, nausea and vomiting.   Genitourinary: Negative for dysuria and flank pain.   Neurological: Negative for focal weakness, headaches, light-headedness and weakness.   Psychiatric/Behavioral: Negative for altered mental status and substance abuse. The patient is not nervous/anxious.    Allergic/Immunologic: Negative for environmental allergies and hives.     Objective:     Vital Signs (Most Recent):  Temp: 98.9 °F (37.2 °C) (02/11/22 1605)  Pulse: 79 (02/11/22 1801)  Resp: 20 (02/11/22 1801)  BP: (!) 140/58 (02/11/22 1801)  SpO2: 96 % (02/11/22 1801) Vital Signs (24h Range):  Temp:  [97.9 °F (36.6 °C)-98.9 °F (37.2 °C)] 98.9 °F (37.2 °C)  Pulse:  [] 79  Resp:  [14-47] 20  SpO2:  [92 %-100 %] 96 %  BP: (104-209)/() 140/58     Weight: 89.8 kg (197 lb 15.6 oz)  Body mass index is 35.07 kg/m².    SpO2: 96 %  O2 Device (Oxygen Therapy): nasal cannula      Intake/Output Summary (Last 24 hours) at 2/11/2022 1814  Last data filed at 2/11/2022 1805  Gross per 24 hour   Intake 45.04 ml   Output --   Net 45.04 ml       Lines/Drains/Airways     Peripheral Intravenous Line                 Peripheral IV - Single Lumen 02/10/22 0617 20 G Anterior;Left Forearm 1 day         Peripheral IV - Single Lumen 02/10/22 1744 22 G Posterior;Right Hand 1 day         Peripheral IV - Single Lumen 02/11/22 1630 20 G Anterior;Right Upper Arm <1 day                Physical Exam  Vitals and nursing note reviewed.   Constitutional:       General: She is not in acute distress.     Appearance: Normal appearance. She is obese. She is not ill-appearing, toxic-appearing or diaphoretic.   HENT:      Head:  Normocephalic and atraumatic.   Eyes:      General: No scleral icterus.        Right eye: No discharge.         Left eye: No discharge.      Extraocular Movements: Extraocular movements intact.      Conjunctiva/sclera: Conjunctivae normal.   Cardiovascular:      Rate and Rhythm: Normal rate and regular rhythm.      Pulses: Normal pulses.      Heart sounds: Normal heart sounds.   Pulmonary:      Effort: Pulmonary effort is normal. No respiratory distress.      Breath sounds: No wheezing or rales.      Comments: Decreased breath sounds  Breathing on 4L via NC  Chest:      Chest wall: No tenderness.   Abdominal:      General: Abdomen is flat. Bowel sounds are normal. There is no distension.      Palpations: Abdomen is soft.      Tenderness: There is no abdominal tenderness.   Musculoskeletal:         General: No swelling or tenderness. Normal range of motion.      Cervical back: Normal range of motion and neck supple.      Right lower leg: Edema present.      Left lower leg: Edema present.   Skin:     General: Skin is warm and dry.      Capillary Refill: Capillary refill takes less than 2 seconds.      Findings: Erythema (lower extermity venous stasis) present.   Neurological:      General: No focal deficit present.      Mental Status: She is alert. Mental status is at baseline.   Psychiatric:         Mood and Affect: Mood normal.         Behavior: Behavior normal.         Significant Labs:   CMP   Recent Labs   Lab 02/10/22  0538 02/10/22  1806 02/11/22 0312    141 140   K 5.2* 4.9 4.9    101 103   CO2 28 29 30*   * 135* 135*   BUN 30* 29* 32*   CREATININE 1.6* 1.4 1.6*   CALCIUM 9.3 9.4 9.0   PROT  --  6.6 5.8*   ALBUMIN  --  3.6 3.3*   BILITOT  --  0.5 0.6   ALKPHOS  --  55 46*   AST  --  22 29   ALT  --  15 14   ANIONGAP 11 11 7*   ESTGFRAFRICA 36.3* 42.7* 36.3*   EGFRNONAA 31.5* 37.0* 31.5*   , CBC   Recent Labs   Lab 02/10/22  0538 02/10/22  0538 02/11/22 0312 02/11/22 0312 02/11/22  1029    WBC 6.67  --  9.48  --  8.82   HGB 13.2  --  10.9*  --  11.0*   HCT 43.8   < > 35.7*   < > 36.4*     --  161  --  167    < > = values in this interval not displayed.    and Troponin   Recent Labs   Lab 02/10/22  1806 02/11/22  1518   TROPONINI 0.278* 3.829*       Significant Imaging:     Transthoracic echo (TTE) complete[02/11/22]  Summary    · The estimated PA systolic pressure is 37 mmHg.  · The left ventricle is normal in size with normal systolic function.  · The estimated ejection fraction is 60%.  · Grade II left ventricular diastolic dysfunction.  · Normal right ventricular size with normal right ventricular systolic function.  · Severe left atrial enlargement.  · Moderate right atrial enlargement.  · Mild mitral regurgitation.  · Mild tricuspid regurgitation.  · Normal central venous pressure (3 mmHg).    Angiogram, Coronary, with Left Heart Cath[02/10/22]    · The RPAV lesion was 100% stenosed.  · The RPDA lesion was 100% stenosed.  · The Prox Cx to Mid Cx lesion was 80% stenosed.  · The Dist Cx lesion was 70% stenosed.  · The 1st LPL lesion was 90% stenosed.  · The Prox RCA-2 lesion was 70% stenosed.  · The Prox RCA-1 lesion was 90% stenosed.  · The Mid LAD-2 lesion was 60% stenosed.  · The estimated blood loss was <50 mL.  · There was three vessel coronary artery disease.

## 2022-02-12 NOTE — HOSPITAL COURSE
Patient admitted to CCU while pending evaluation for ACS intervention, given her NSTEMI. Interventional Cardiology and Cardiothoracic Surgery consulted. Initiated guideline directed medical therapy for ACS. Nitroglycerin infusion for chest pain alleviation.  CTS evaluation did not recommend patient for CABG due to her debility as she is prohibitively high risk for CABG. Given contrast allergy, Interventional Cards started on prednisone 50 mg, famotidine 20, diphenhydramine 50 in anticipation of LHC. Patient had significant abdominal pain overnight and distended abdomen with mass. In light of this, IC deferred LHC. CT of the abdomen showed a large rectus sheath hematoma. Given patient's kidney function, CTA was deferred temporarily but the abdominal mass continued to enlarge. CTA of the abdomen showed extravasation of contrast into the rectus sheath hematoma. IR consulted and performed embolization of the inferior epigastric and collateral arteries. Patient's Cr elevated to 1.9 likely due to contrast. Started patient on slow NS infusion and will defer IC intervention until Cr returns to baseline or stabilizes. Patient's Hgb dropped requiring transfusion with aim >10 for IC intervention. Patient was given 2 pRBC and Hgb responded up to 9.2. IC recommended that since patient had a recent bleed, would defer procedure for outpatient in 1-2 weeks. Patient discharged to SNF and has appointment with Dr. Chappell for possible PCI on 3/17/2022.

## 2022-02-12 NOTE — NURSING
Pt's pressures elevated to 195/81 with MAP of 117 and 174/89 with MAP of 123. Pt remains chest pain free. CCU fellow notified. Orders from Dr. Tena to increase nitro gtt to 45 mcg/min. Will adjust and continue to monitor.

## 2022-02-12 NOTE — CONSULTS
[facility] - Cardiac Intensive Care  General Surgery  Consult Note    Inpatient consult to Cardiothoracic Surgery  Consult performed by: Mirian Lynch MD  Consult ordered by: Karl Tena MD        Subjective:     History of Present Illness:   Ms Marisol Kerr is a 76 year old female with h/o of DM2, HTN, HLD, CKD, COPD who presented to [facility] yesterday for a left heart catheterization to evaluate  recently increasing episodes of angina requiring more frequent Nitroglycerin use. She was transferred for evaluation of her ACS and potential intervention. Currently on nitro gtt and heparin, reports that she doesn't have chest pain since starting nitro. Patient lives independently. She uses 2L of home oxygen, sometimes requiring 3.5L. She is unable to walk farther than around her house.     Angiogram, Coronary, with Left Heart Cath[02/10/22]  The RPAV lesion was 100% stenosed.  The RPDA lesion was 100% stenosed.  The Prox Cx to Mid Cx lesion was 80% stenosed.  The Dist Cx lesion was 70% stenosed.  The 1st LPL lesion was 90% stenosed.  The Prox RCA-2 lesion was 70% stenosed.  The Prox RCA-1 lesion was 90% stenosed.  The Mid LAD-2 lesion was 60% stenosed.  The estimated blood loss was <50 mL.  There was three vessel coronary artery disease.    Current Facility-Administered Medications on File Prior to Encounter   Medication    [COMPLETED] metoprolol succinate (TOPROL-XL) 24 hr tablet 25 mg    [COMPLETED] morphine injection 2 mg    [COMPLETED] nitroGLYCERIN (NITROSTAT) 0.4 MG SL tablet    [DISCONTINUED] acetaminophen tablet 650 mg    [DISCONTINUED] albuterol inhaler 2 puff    [DISCONTINUED] albuterol-ipratropium 2.5 mg-0.5 mg/3 mL nebulizer solution 3 mL    [DISCONTINUED] albuterol-ipratropium 2.5 mg-0.5 mg/3 mL nebulizer solution 3 mL    [DISCONTINUED] amLODIPine tablet 5 mg    [DISCONTINUED] aspirin EC tablet 81 mg    [DISCONTINUED] atorvastatin tablet 40 mg    [DISCONTINUED] dextrose 50%  injection 12.5 g    [DISCONTINUED] dextrose 50% injection 12.5 g    [DISCONTINUED] dextrose 50% injection 25 g    [DISCONTINUED] dextrose 50% injection 25 g    [DISCONTINUED] enoxaparin injection 40 mg    [DISCONTINUED] glucagon (human recombinant) injection 1 mg    [DISCONTINUED] glucose chewable tablet 16 g    [DISCONTINUED] glucose chewable tablet 24 g    [DISCONTINUED] heparin 25,000 units in dextrose 5% (100 units/ml) IV bolus from bag - ADDITIONAL PRN BOLUS - 30 units/kg (max bolus 4000 units)    [DISCONTINUED] heparin 25,000 units in dextrose 5% (100 units/ml) IV bolus from bag - ADDITIONAL PRN BOLUS - 60 units/kg (max bolus 4000 units)    [DISCONTINUED] heparin 25,000 units in dextrose 5% (100 units/ml) IV bolus from bag INITIAL BOLUS (max bolus 4000 units)    [DISCONTINUED] heparin 25,000 units in dextrose 5% 250 mL (100 units/mL) infusion LOW INTENSITY nomogram - [facility]    [DISCONTINUED] hydrALAZINE injection 10 mg    [DISCONTINUED] insulin regular injection 1-12 Units    [DISCONTINUED] lisinopriL tablet 40 mg    [DISCONTINUED] melatonin tablet 6 mg    [DISCONTINUED] metoprolol injection 5 mg    [DISCONTINUED] metoprolol succinate (TOPROL-XL) 24 hr tablet 25 mg    [DISCONTINUED] metoprolol succinate (TOPROL-XL) 24 hr tablet 50 mg    [DISCONTINUED] naloxone 0.4 mg/mL injection 0.02 mg    [DISCONTINUED] nitroGLYCERIN 50 mg in dextrose 5 % 250 mL infusion (TITRATING)    [DISCONTINUED] nitroGLYCERIN 50 mg/250 mL (200 mcg/mL) infusion    [DISCONTINUED] nitroGLYCERIN SL tablet 0.4 mg    [DISCONTINUED] ondansetron disintegrating tablet 8 mg    [DISCONTINUED] ondansetron injection 4 mg    [DISCONTINUED] pantoprazole EC tablet 40 mg    [DISCONTINUED] senna-docusate 8.6-50 mg per tablet 2 tablet    [DISCONTINUED] sodium chloride 0.9% flush 10 mL    [DISCONTINUED] zolpidem tablet 5 mg     Current Outpatient Medications on File Prior to Encounter   Medication Sig    acetaminophen (TYLENOL) 500 MG tablet Take 500 mg by  mouth every 6 (six) hours as needed for Pain.     albuterol (VENTOLIN HFA) 90 mcg/actuation inhaler Inhale 2 puffs into the lungs every 6 (six) hours as needed for Wheezing or Shortness of Breath. Rescue    amLODIPine (NORVASC) 5 MG tablet Take 1 tablet (5 mg total) by mouth once daily.    aspirin (ECOTRIN) 81 MG EC tablet Take 81 mg by mouth every morning.     benazepriL (LOTENSIN) 40 MG tablet Take 1 tablet (40 mg total) by mouth once daily.    bromfenac (PROLENSA) 0.07 % Drop Place 1 drop into both eyes daily as needed.    coenzyme Q10 100 mg capsule Take 100 mg by mouth every morning.    dextran 70-hypromellose 0.1-0.3 % Drop Place 1 drop into both eyes daily as needed.    ergocalciferol (VITAMIN D2) 50,000 unit Cap Take 1 capsule (50,000 Units total) by mouth every 7 days.    fish oil-omega-3 fatty acids 300-1,000 mg capsule Take 1 capsule by mouth every morning.    FLAXSEED OIL ORAL Take 1,200 mg by mouth every morning.    fluticasone-salmeterol diskus inhaler 250-50 mcg Inhale 1 puff into the lungs 2 (two) times daily.    furosemide (LASIX) 20 MG tablet Take 1 tablet (20 mg total) by mouth every other day.    ipratropium-albuteroL (COMBIVENT RESPIMAT)  mcg/actuation inhaler Inhale 2 puffs into the lungs every 4 (four) hours as needed for Shortness of Breath. Rescue    lovastatin (MEVACOR) 40 MG tablet Take 0.5 tablets (20 mg total) by mouth every evening. (Patient taking differently: Take 40 mg by mouth every other day.)    metoprolol succinate (TOPROL-XL) 50 MG 24 hr tablet Take 0.5 tablets (25 mg total) by mouth once daily.    NARCAN 4 mg/actuation Spry     nitroGLYCERIN 0.2 mg/hr TD PT24 (NITRODUR) 0.2 mg/hr APPLY 1 PATCH TOPICALLY ONCE DAILY TO LEG.    nitroGLYCERIN 0.4 MG/DOSE TL SPRY (NITROLINGUAL) 400 mcg/spray spray USE ONE SPRAY UNDER THE TONGUE EVERY 5 MINUTES AS NEEDED FOR CHEST PAIN.  DO NOT EXCEED A TOTAL OF 3 SPRAYS IN 15 MINUTES    ondansetron (ZOFRAN-ODT) 4 MG TbDL Take 2 tablets (8 mg  total) by mouth every 6 (six) hours as needed.    pantoprazole (PROTONIX) 40 MG tablet Take 1 tablet (40 mg total) by mouth once daily.    PNV NO.115/IRON FUMARATE/FA (PRENATAL #115-IRON-FOLIC ACID ORAL) Take 1 tablet by mouth every morning.     predniSONE (DELTASONE) 20 MG tablet Take 1 tablet (20 mg total) by mouth As instructed. Take 3 tabs the night before procedure & 3 tabs morning of procedure    temazepam (RESTORIL) 15 mg Cap Take 1 capsule (15 mg total) by mouth every evening.    triamcinolone acetonide 0.1% (KENALOG) 0.1 % cream Apply topically 2 (two) times daily. For legs.    umeclidinium (INCRUSE ELLIPTA) 62.5 mcg/actuation inhalation capsule Inhale 1 capsule (63 mcg total) into the lungs every morning. Controller    vit C/E/Zn/coppr/lutein/zeaxan (PRESERVISION AREDS-2 ORAL) Take 1 capsule by mouth every morning.       Review of patient's allergies indicates:   Allergen Reactions    Iodinated contrast media     Strawberries [strawberry] Hives and Itching       Past Medical History:   Diagnosis Date    CAD (coronary artery disease)     Cancer     colon    CHF (congestive heart failure)     Colitis     Colon cancer     COPD (chronic obstructive pulmonary disease)     Decreased hearing     Diabetes mellitus     Diabetes mellitus, type 2     Hypercholesterolemia     Hypertension     Hypoxemia     Insomnia     Obesity     Osteoarthritis      Past Surgical History:   Procedure Laterality Date    ANGIOGRAM, CORONARY, WITH LEFT HEART CATHETERIZATION N/A 2/10/2022    Procedure: Angiogram, Coronary, with Left Heart Cath;  Surgeon: Cristian Benjamin MD;  Location: [facility] CATH/EP LAB;  Service: Cardiology;  Laterality: N/A;    CARDIAC CATHETERIZATION      CHOLECYSTECTOMY      COLECTOMY      CORONARY ANGIOPLASTY      CORONARY STENT PLACEMENT      EXTERNAL EAR SURGERY      EYE SURGERY      FLEXIBLE SIGMOIDOSCOPY N/A 9/19/2019    Procedure: SIGMOIDOSCOPY, FLEXIBLE;  Surgeon: Biju Kramer III, MD;  Location:  [facility];  Service: Endoscopy;  Laterality: N/A;    HYSTERECTOMY      partial     Family History       Problem Relation (Age of Onset)    Febrile seizures Mother    Heart failure Father          Tobacco Use    Smoking status: Former Smoker     Packs/day: 3.00     Years: 50.00     Pack years: 150.00     Types: Cigarettes     Start date: 3/30/1964     Quit date: 2/28/2006     Years since quitting: 15.9    Smokeless tobacco: Never Used    Tobacco comment: ex smoker 15 years   Substance and Sexual Activity    Alcohol use: Yes    Drug use: No    Sexual activity: Never     Review of Systems   Constitutional: Negative for activity change, chills and fever.   Respiratory: Negative for cough and shortness of breath.    Cardiovascular: Negative for chest pain and palpitations.   Gastrointestinal: Negative for abdominal distention, abdominal pain, constipation, diarrhea, nausea and vomiting.   Musculoskeletal: Negative for arthralgias and myalgias.   Neurological: Negative for dizziness and headaches.   Psychiatric/Behavioral: Negative for agitation and confusion.     Objective:     Vital Signs (Most Recent):  Temp: 98 °F (36.7 °C) (02/12/22 0705)  Pulse: 90 (02/12/22 0905)  Resp: (!) 22 (02/12/22 0905)  BP: (!) 163/74 (02/12/22 0905)  SpO2: 95 % (02/12/22 0905) Vital Signs (24h Range):  Temp:  [98 °F (36.7 °C)-98.9 °F (37.2 °C)] 98 °F (36.7 °C)  Pulse:  [54-94] 90  Resp:  [15-41] 22  SpO2:  [95 %-100 %] 95 %  BP: ()/() 163/74     Weight: 89.8 kg (197 lb 15.6 oz)  Body mass index is 35.07 kg/m².      Intake/Output Summary (Last 24 hours) at 2/12/2022 0936  Last data filed at 2/12/2022 0601  Gross per 24 hour   Intake 706.08 ml   Output 1100 ml   Net -393.92 ml       Physical Exam  Constitutional:       General: She is not in acute distress.  Cardiovascular:      Rate and Rhythm: Normal rate and regular rhythm.   Pulmonary:      Effort: Pulmonary effort is normal.      Comments: On 2L NC  Abdominal:      General:  There is no distension.      Palpations: Abdomen is soft.   Neurological:      General: No focal deficit present.      Mental Status: She is alert and oriented to person, place, and time.   Psychiatric:         Mood and Affect: Mood normal.         Behavior: Behavior normal.         Significant Labs:  CBC:   Recent Labs   Lab 02/12/22  0536   WBC 9.64   RBC 3.86*   HGB 10.9*   HCT 36.4*   *   MCV 94   MCH 28.2   MCHC 29.9*     CMP:   Recent Labs   Lab 02/12/22  0536   *   CALCIUM 8.9   ALBUMIN 3.1*   PROT 6.0      K 5.7*   CO2 24      BUN 39*   CREATININE 1.7*   ALKPHOS 61   ALT 13   AST 25   BILITOT 0.4       Significant Diagnostics:  I have reviewed all pertinent imaging results/findings within the past 24 hours.    Assessment/Plan:     Active Diagnoses:    Diagnosis Date Noted POA    PRINCIPAL PROBLEM:  NSTEMI (non-ST elevated myocardial infarction) [I21.4] 02/11/2022 Yes    Venous stasis [I87.8] 02/10/2022 Yes     Chronic    Acute on chronic diastolic congestive heart failure [I50.33] 09/17/2019 Yes    COPD/emphysema [J44.9] 01/16/2019 Yes     Chronic    CKD (chronic kidney disease), stage III [N18.30] 10/08/2014 Yes    DM type 2, controlled, with complication [E11.8] 06/03/2013 Yes    Gastroesophageal reflux disease without esophagitis [K21.9] 06/03/2013 Yes    Coronary artery disease, multivessel with history of previous PCI [I25.10] 06/03/2013 Yes    Hypercholesterolemia [E78.00] 04/09/2013 Yes    Essential hypertension, benign [I10] 04/09/2013 Yes    Coronary atherosclerosis [I25.10] 04/09/2013 Yes      Problems Resolved During this Admission:     Patient with multivessel coronary artery disease. Unfortunately, due to her inability to walk farther than a few feet, she is prohibitively high risk for CABG. Patient reports she is already being scheduled for stents on Monday.    Mirian Lynch MD, PGY-6  Cardiothoracic Surgery   [phone]    Patient seen and pertinent studies were  reviewed.  Patient is oxygen-dependent at home.  Patient would be extremely high risk for vent dependency after surgery.  On further inquiry patient does not want surgery.  For all of those reasons proceed with PCI.  Thank you for involving me in the care of this patient.

## 2022-02-12 NOTE — PROGRESS NOTES
Pharmacist Renal Dose Adjustment Note    Marisol Kerr is a 76 y.o. female being treated with the medication famotidine.    Patient Data:    Vital Signs (Most Recent):  Temp: 98 °F (36.7 °C) (02/12/22 0705)  Pulse: 66 (02/12/22 1324)  Resp: 16 (02/12/22 1324)  BP: (!) 118/56 (02/12/22 1305)  SpO2: 97 % (02/12/22 1324)   Vital Signs (72h Range):  Temp:  [97.8 °F (36.6 °C)-99.1 °F (37.3 °C)]   Pulse:  []   Resp:  [13-48]   BP: ()/()   SpO2:  [92 %-100 %]      Recent Labs   Lab 02/11/22  0312 02/12/22  0536 02/12/22  1211   CREATININE 1.6* 1.7* 1.7*     Serum creatinine: 1.7 mg/dL (H) 02/12/22 1211  Estimated creatinine clearance: 30.9 mL/min (A)    Medication: Famotidine 20 mg three times daily will be changed to famotidine 20 mg daily.    Pharmacist's Name: Rainer Conte PharmD  Pharmacist's Extension: [phone]

## 2022-02-12 NOTE — PLAN OF CARE
POC reviewed with Marisol Kerr and family at 0300. Pt verbalized understanding. Heparin and Nitroglycerin gtts continued. Patient has not c/o any chest pain all shift. Purwick in place. 2L NC. Questions and concerns addressed. No acute events overnight. Pt progressing toward goals. Will continue to monitor. See below and flowsheets for full assessment and VS info.       Neuro:  [place] Coma Scale  Best Eye Response: 4-->(E4) spontaneous  Best Motor Response: 6-->(M6) obeys commands  Best Verbal Response: 5-->(V5) oriented  Suzi Coma Scale Score: 15  Assessment Qualifiers: no eye obstruction present,patient not sedated/intubated        24hr Temp:  [98.2 °F (36.8 °C)-98.9 °F (37.2 °C)]     CV:   Rhythm: sinus bradycardia  BP goals:   SBP > 100  MAP > 65    Resp:   O2 Device (Oxygen Therapy): nasal cannula       Plan: N/A    GI/:     Diet/Nutrition Received: 2 gram sodium,low saturated fat/low cholesterol  Last Bowel Movement: 02/09/22  Voiding Characteristics: voids spontaneously without difficulty    Intake/Output Summary (Last 24 hours) at 2/12/2022 0545  Last data filed at 2/12/2022 0501  Gross per 24 hour   Intake 685.56 ml   Output 1100 ml   Net -414.44 ml     Unmeasured Output  Urine Occurrence: 1  Stool Occurrence: 0  Emesis Occurrence: 0  Pad Count: 2    Labs/Accuchecks:  Recent Labs   Lab 02/11/22  1029   WBC 8.82   RBC 3.90*   HGB 11.0*   HCT 36.4*         Recent Labs   Lab 02/11/22  0312      K 4.9   CO2 30*      BUN 32*   CREATININE 1.6*   ALKPHOS 46*   ALT 14   AST 29   BILITOT 0.6      Recent Labs   Lab 02/11/22  1029 02/11/22  1755 02/11/22  2333   INR 1.1  --   --    APTT 126.5*   < > 30.8    < > = values in this interval not displayed.      Recent Labs   Lab 02/11/22  1518   TROPONINI 3.829*       Electrolytes: Electrolytes replaced  Accuchecks: ACHS    Gtts:   heparin (porcine) in D5W 15 Units/kg/hr (02/12/22 0501)    nitroGLYCERIN 35 mcg/min (02/12/22 8769)        LDA/Wounds:  Lines/Drains/Airways       Peripheral Intravenous Line                   Peripheral IV - Single Lumen 02/10/22 1744 22 G Posterior;Right Hand 1 day         Peripheral IV - Single Lumen 02/11/22 1500 20 G Anterior;Right Upper Arm <1 day                  Wounds: No  Wound care consulted: No

## 2022-02-12 NOTE — DISCHARGE SUMMARY
[facility] Medicine  Discharge Summary      Patient Name: Marisol Kerr  MRN: [id number]  Patient Class: IP- Inpatient  Admission Date: 2/10/2022  Hospital Length of Stay: 1 days  Discharge Date and Time:  02/11/2022 6:18 PM  Attending Physician: No att. providers found   Discharging Provider: Maci Glover MD  Primary Care Provider: Khadar Dwyer MD      HPI:   Ms. Kerr is a 76-year-old female who underwent left heart catheterization earlier this morning, hospital medicine consulted for admission postprocedure.  Patient with a known history of CAD status post previous PCI, states she was having intermittent chest discomfort and using nitroglycerin at home.  Has listed allergy to iodinated contrast, was premedicated prior to procedure, had left heart catheterization performed earlier today.  At around 1:00 p.m., after dose of Mucomyst developed chest tightness/chest pain midsternal nonradiating, associated with shortness of breath with respiratory distress and nausea.  More recently patient has developed rash to her face and neck area.  Denies any difficulty tolerating her oral secretions, sensation of airways closing, no wheeze but does report difficult to breathe.  Known history of COPD, on 2-3 L home oxygen, followed by pulmonologist Dr. Correa.  Blood pressure was also elevated with systolics greater than 200s, she is currently on nitroglycerin drip, received her home antihypertensives including amlodipine and benazepril prior to my encounter, SBP currently 170. States she previously had history of diabetes but this has improved and no longer takes medication for same.  Has been afebrile.  Discussed with cardiologist, significant coronary artery disease, including mid in stent circumflex stenosis, RCA disease, LAD about 50% disease.  Previous labs with potassium 5.2, renal dysfunction with BUN/creatinine 30/1.6 (baseline).  Discussed with RN at bedside.  Discussed with patient and  granddaughter present at bedside.      Procedure(s) (LRB):  Angiogram, Coronary, with Left Heart Cath (N/A)      Hospital Course:   Patient was admitted after left heart catheterization which demonstrated severe triple-vessel disease, see report below.  Developed worsening shortness of breath with chest pain, hypertensive urgency and concern for allergic reaction, known history of contrast allergy.  She was started on medications for potential allergic reaction, supplemental oxygen with p.r.n. BiPAP, nitroglycerin drip for chest pain/hypertensive urgency and admitted to the ICU.  Cardiology and Pulmonary were consulted.  Overnight patient with ongoing intermittent chest pain requiring up titration of nitroglycerin.  In the morning severe episode of chest pain, received medical management, heparin infusion started, beta-blocker, morphine, troponin increased 3, consistent with NSTEMI, echo with EF of 60 with grade 2 diastolic dysfunction.  Given patient's severe COPD with comorbidities, high risk for CABG, cardiology recommended transfer to [facility] for high risk PCI and patient was accepted, transferred once bed was available.  Discussed with Cardiology on day of transfer.  Discussed with patient and granddaughter at bedside.  Discussed with ICU RN.    Discharge examination  Sitting in bed, alert, on supplemental oxygen, regular heart rhythm, erythema lower extremity with induration and edema    [facility]  Summary       · The RPAV lesion was 100% stenosed.  · The RPDA lesion was 100% stenosed.  · The Prox Cx to Mid Cx lesion was 80% stenosed.  · The Dist Cx lesion was 70% stenosed.  · The 1st LPL lesion was 90% stenosed.  · The Prox RCA-2 lesion was 70% stenosed.  · The Prox RCA-1 lesion was 90% stenosed.  · The Mid LAD-2 lesion was 60% stenosed.  · The estimated blood loss was <50 mL.  · There was three vessel coronary artery disease.          Goals of Care Treatment Preferences:  Code Status: Full Code      Consults:    Consults (From admission, onward)        Status Ordering Provider     Inpatient consult to Pulmonology  Once        Provider:  Jayne Correa MD    Completed VILMA TRACY          No new Assessment & Plan notes have been filed under this hospital service since the last note was generated.  Service: Hospital Medicine    Final Active Diagnoses:    Diagnosis Date Noted POA    PRINCIPAL PROBLEM:  NSTEMI (non-ST elevated myocardial infarction) [I21.4] 02/11/2022 Yes    Hypertensive urgency [I16.0] 02/10/2022 Yes    Acute on chronic respiratory failure [J96.20] 02/10/2022 Yes    Venous stasis [I87.8] 02/10/2022 Yes     Chronic    Hard of hearing [H91.90] 02/10/2022 Yes     Chronic    Primary insomnia [F51.01] 09/26/2019 Yes    CHF (congestive heart failure) [I50.9] 09/17/2019 Yes     Chronic    COPD/emphysema [J44.9] 01/16/2019 Yes     Chronic    Lung nodule [R91.1] 10/27/2016 Yes    Class 2 obesity in adult [E66.9] 05/18/2015 Yes    CKD (chronic kidney disease), stage III [N18.30] 10/08/2014 Yes    Diabetic peripheral vascular disease [E11.51] 06/26/2014 Yes    Coronary artery disease, multivessel with history of previous PCI [I25.10] 06/03/2013 Yes    Hypertension [I10] 06/03/2013 Yes    Gastroesophageal reflux disease without esophagitis [K21.9] 06/03/2013 Yes      Problems Resolved During this Admission:    Diagnosis Date Noted Date Resolved POA    Hyperkalemia [E87.5] 02/10/2022 02/11/2022 Yes    Allergic reaction [T78.40XA] 02/10/2022 02/11/2022 Yes       Discharged Condition: good    Disposition: Short Term Hospital    Follow Up:    Patient Instructions:      Diet Cardiac     Lifting restrictions   Order Comments: No lifting greater than 10 lbs. for1 week     Call MD for:  persistent nausea and vomiting     Call MD for:  severe uncontrolled pain     Call MD for:  difficulty breathing, headache or visual disturbances     Call MD for:  redness, tenderness, or signs of infection (pain,  swelling, redness, odor or green/yellow discharge around incision site)     Call MD for:  hives     Call MD for:  persistent dizziness or light-headedness     Call MD for:   Order Comments: Temp greater than 101 degrees F     Remove dressing in 48 hours       Significant Diagnostic Studies: Labs:   BMP:   Recent Labs   Lab 02/10/22  0538 02/10/22  1806 02/11/22  0312   * 135* 135*    141 140   K 5.2* 4.9 4.9    101 103   CO2 28 29 30*   BUN 30* 29* 32*   CREATININE 1.6* 1.4 1.6*   CALCIUM 9.3 9.4 9.0   MG  --   --  2.1   , CMP   Recent Labs   Lab 02/10/22  0538 02/10/22  1806 02/11/22  0312    141 140   K 5.2* 4.9 4.9    101 103   CO2 28 29 30*   * 135* 135*   BUN 30* 29* 32*   CREATININE 1.6* 1.4 1.6*   CALCIUM 9.3 9.4 9.0   PROT  --  6.6 5.8*   ALBUMIN  --  3.6 3.3*   BILITOT  --  0.5 0.6   ALKPHOS  --  55 46*   AST  --  22 29   ALT  --  15 14   ANIONGAP 11 11 7*   ESTGFRAFRICA 36.3* 42.7* 36.3*   EGFRNONAA 31.5* 37.0* 31.5*   , CBC   Recent Labs   Lab 02/10/22  0538 02/10/22  0538 02/11/22 0312 02/11/22 0312 02/11/22  1029   WBC 6.67  --  9.48  --  8.82   HGB 13.2  --  10.9*  --  11.0*   HCT 43.8   < > 35.7*   < > 36.4*     --  161  --  167    < > = values in this interval not displayed.   , INR   Lab Results   Component Value Date    INR 1.1 02/11/2022    INR 1.0 02/10/2022    INR 0.9 09/16/2019   , Lipid Panel   Lab Results   Component Value Date    CHOL 165 09/21/2021    HDL 53 09/21/2021    LDLCALC 98.2 09/21/2021    TRIG 69 09/21/2021    CHOLHDL 32.1 09/21/2021   , Troponin   Recent Labs   Lab 02/10/22  1806 02/11/22  1518   TROPONINI 0.278* 3.829*    and A1C:   Recent Labs   Lab 01/27/22  1141   HGBA1C 5.2   X-Ray Chest AP Portable    Result Date: 2/10/2022  Reason: respiratory distress FINDINGS: PA and lateral chest with comparison chest x-ray September 16, 2019 show normal cardiomediastinal silhouette. Right subclavian Port-A-Cath again noted. Bilateral  interstitial lung opacities are unchanged from prior exam. No new confluent airspace opacities. Pulmonary vasculature is normal. No acute osseous abnormality. IMPRESSION: Chronic interstitial lung opacities could reflect scarring or chronic interstitial lung disease. Electronically signed by:  Pasha Lee DO  2/10/2022 4:11 PM [facility] Workstation: [id number]    Echo    Result Date: 2/11/2022  · The estimated PA systolic pressure is 37 mmHg. · The left ventricle is normal in size with normal systolic function. · The estimated ejection fraction is 60%. · Grade II left ventricular diastolic dysfunction. · Normal right ventricular size with normal right ventricular systolic function. · Severe left atrial enlargement. · Moderate right atrial enlargement. · Mild mitral regurgitation. · Mild tricuspid regurgitation. · Normal central venous pressure (3 mmHg).      Cardiac catheterization    Result Date: 2/10/2022  · The RPAV lesion was 100% stenosed. · The RPDA lesion was 100% stenosed. · The Prox Cx to Mid Cx lesion was 80% stenosed. · The Dist Cx lesion was 70% stenosed. · The 1st LPL lesion was 90% stenosed. · The Prox RCA-2 lesion was 70% stenosed. · The Prox RCA-1 lesion was 90% stenosed. · The Mid LAD-2 lesion was 60% stenosed. · The estimated blood loss was <50 mL. · There was three vessel coronary artery disease.  The procedure log was documented by Documenter: RT Neto and verified by Cristian Benjamin MD. Date: 2/10/2022  Time: 8:30 PM       Pending Diagnostic Studies:     Procedure Component Value Units Date/Time    EKG 12-lead [[id number]] Collected: 02/11/22 0945    Order Status: Sent Lab Status: In process Updated: 02/11/22 1006    Narrative:      Test Reason : R07.9,    Vent. Rate : 121 BPM     Atrial Rate : 121 BPM     P-R Int : 132 ms          QRS Dur : 102 ms      QT Int : 326 ms       P-R-T Axes : 077 070 216 degrees     QTc Int : 462 ms    Sinus tachycardia  LVH with repolarization  abnormality  Marked ST abnormality, possible anterior subendocardial injury  Abnormal ECG  When compared with ECG of 11-FEB-2022 03:34,  ST more depressed in Anterior-lateral leads    Referred By:             Confirmed By:     EKG 12-lead [[id number]] Collected: 02/10/22 1408    Order Status: Sent Lab Status: In process Updated: 02/10/22 0835    Narrative:      Test Reason : R07.9,    Vent. Rate : 109 BPM     Atrial Rate : 109 BPM     P-R Int : 216 ms          QRS Dur : 106 ms      QT Int : 338 ms       P-R-T Axes : 000 141 193 degrees     QTc Int : 455 ms    Sinus tachycardia with 1st degree A-V block  Left posterior fascicular block  LVH with repolarization abnormality  Abnormal ECG  When compared with ECG of 07-OCT-2021 10:54,  MI interval has increased  Vent. rate has increased BY  60 BPM  ST now depressed in Anterior-lateral leads  Inverted T waves have replaced nonspecific T wave abnormality in Inferior  leads  T wave inversion now evident in Lateral leads    Referred By:             Confirmed By:          Medications:  Reconciled Home Medications:      Medication List      CHANGE how you take these medications    amLODIPine 5 MG tablet  Commonly known as: NORVASC  Take 1 tablet (5 mg total) by mouth once daily.  What changed:   · medication strength  · how much to take     lovastatin 40 MG tablet  Commonly known as: MEVACOR  Take 0.5 tablets (20 mg total) by mouth every evening.  What changed:   · how much to take  · when to take this        CONTINUE taking these medications    acetaminophen 500 MG tablet  Commonly known as: TYLENOL  Take 500 mg by mouth every 6 (six) hours as needed for Pain.     albuterol 90 mcg/actuation inhaler  Commonly known as: VENTOLIN HFA  Inhale 2 puffs into the lungs every 6 (six) hours as needed for Wheezing or Shortness of Breath. Rescue     aspirin 81 MG EC tablet  Commonly known as: ECOTRIN  Take 81 mg by mouth every morning.     benazepriL 40 MG tablet  Commonly known as:  LOTENSIN  Take 1 tablet (40 mg total) by mouth once daily.     bromfenac 0.07 % Drop  Commonly known as: PROLENSA  Place 1 drop into both eyes daily as needed.     coenzyme Q10 100 mg capsule  Take 100 mg by mouth every morning.     CombiVENT RESPIMAT  mcg/actuation inhaler  Generic drug: ipratropium-albuteroL  Inhale 2 puffs into the lungs every 4 (four) hours as needed for Shortness of Breath. Rescue     dextran 70-hypromellose 0.1-0.3 % Drop  Place 1 drop into both eyes daily as needed.     ergocalciferol 50,000 unit Cap  Commonly known as: VITAMIN D2  Take 1 capsule (50,000 Units total) by mouth every 7 days.     fish oil-omega-3 fatty acids 300-1,000 mg capsule  Take 1 capsule by mouth every morning.     FLAXSEED OIL ORAL  Take 1,200 mg by mouth every morning.     fluticasone-salmeterol 250-50 mcg/dose 250-50 mcg/dose diskus inhaler  Commonly known as: ADVAIR DISKUS  Inhale 1 puff into the lungs 2 (two) times daily.     furosemide 20 MG tablet  Commonly known as: LASIX  Take 1 tablet (20 mg total) by mouth every other day.     INCRUSE ELLIPTA 62.5 mcg/actuation inhalation capsule  Generic drug: umeclidinium  Inhale 1 capsule (63 mcg total) into the lungs every morning. Controller     metoprolol succinate 50 MG 24 hr tablet  Commonly known as: TOPROL-XL  Take 0.5 tablets (25 mg total) by mouth once daily.     NARCAN 4 mg/actuation Spry  Generic drug: naloxone     * nitroGLYCERIN 0.2 mg/hr TD PT24 0.2 mg/hr  Commonly known as: NITRODUR  APPLY 1 PATCH TOPICALLY ONCE DAILY TO LEG.     * nitroGLYCERIN 0.4 MG/DOSE TL SPRY 400 mcg/spray spray  Commonly known as: NITROLINGUAL  USE ONE SPRAY UNDER THE TONGUE EVERY 5 MINUTES AS NEEDED FOR CHEST PAIN.  DO NOT EXCEED A TOTAL OF 3 SPRAYS IN 15 MINUTES     ondansetron 4 MG Tbdl  Commonly known as: ZOFRAN-ODT  Take 2 tablets (8 mg total) by mouth every 6 (six) hours as needed.     pantoprazole 40 MG tablet  Commonly known as: PROTONIX  Take 1 tablet (40 mg total) by  mouth once daily.     predniSONE 20 MG tablet  Commonly known as: DELTASONE  Take 1 tablet (20 mg total) by mouth As instructed. Take 3 tabs the night before procedure & 3 tabs morning of procedure     PRENATAL #115-IRON-FOLIC ACID ORAL  Take 1 tablet by mouth every morning.     PRESERVISION AREDS-2 ORAL  Take 1 capsule by mouth every morning.     temazepam 15 mg Cap  Commonly known as: RESTORIL  Take 1 capsule (15 mg total) by mouth every evening.     triamcinolone acetonide 0.1% 0.1 % cream  Commonly known as: KENALOG  Apply topically 2 (two) times daily. For legs.         * This list has 2 medication(s) that are the same as other medications prescribed for you. Read the directions carefully, and ask your doctor or other care provider to review them with you.            STOP taking these medications    meloxicam 7.5 MG tablet  Commonly known as: MOBIC     MODERNA COVID-19 VACCINE (EUA) IM     spironolactone 25 MG tablet  Commonly known as: ALDACTONE            Indwelling Lines/Drains at time of discharge:   Lines/Drains/Airways     None                 Time spent on the discharge of patient: 32 minutes         Maci Glover MD  Department of Hospital Medicine  [facility]

## 2022-02-12 NOTE — ASSESSMENT & PLAN NOTE
Baseline creatinine 1.5- 1.8   Creatinine uptrending, but stable for now.   Recent Labs     02/10/22  1806 02/11/22  0312 02/12/22  0536   CREATININE 1.4 1.6* 1.7*   BUN 29* 32* 39*     Estimated Creatinine Clearance: 30.9 mL/min (A) (based on SCr of 1.7 mg/dL (H)). according to latest data.     PLAN  Monitor UOP and serial BMP and adjust therapy as needed.   Avoid nephrotoxic meds  Renally dose meds.

## 2022-02-12 NOTE — PROGRESS NOTES
MD Jamel, notified of patient's potassium of 5.7. No clinical changes noted. 12 lead EKG obtained and sent to [facility]. Awaiting any new orders

## 2022-02-12 NOTE — PROGRESS NOTES
Per MD, scheduled Benedryl is being given prophylactic for patient's contrast allergies/previous reaction and in case of need for emergent cath lab PCI

## 2022-02-12 NOTE — H&P
"[facility]  Cardiology  History and Physical     Patient Name: Marisol Kerr  MRN: [id number]  Admission Date: 2/11/2022  Code Status: Full Code   Attending Provider: Karl Ontiveros MD   Primary Care Physician: Khadar Dwyer MD  Principal Problem:NSTEMI (non-ST elevated myocardial infarction)    Patient information was obtained from patient, past medical records and ER records.     Subjective:     Chief Complaint: NSTEMI    HPI:  Ms Marisol Kerr is a 76 year old female with PMH of DM2, HTN, HLD, CKD, COPD who initially presented to [facility] yesterday for a left heart catheterization to evaluate  recently increasing episodes of angina requiring more frequent Nitroglycerin use. She has history of iodine allergy and was pretreated with prednisone, but post- procedure she developed SOB, respiratory distress requiring overnight hospitalization for monitoring. She also had elevated blood pressures with systolics greater than 200s during hospitalization. Overnight, as she woke up to urinate she felt 10/10 chest pain all over that she described as "burning", and "veins being ripped apart". STAT EKG at the time showed concerns for  lateral precordial ST depressions. She was initiated on nitroglycerin drip, heparin drip. One dose of  morphine 2mg & Lopressor 5mg IV were also administered. She reported alleviation of her chest pain after,  and it has not recurred on Nitroglycerin drip. She was transferred to the hospital for further evaluation of her ACS and potential emergent intervention. LHC at outside hospital was notable for significant coraonary artery disease:    Angiogram, Coronary, with Left Heart Cath[02/10/22]  · The RPAV lesion was 100% stenosed.  · The RPDA lesion was 100% stenosed.  · The Prox Cx to Mid Cx lesion was 80% stenosed.  · The Dist Cx lesion was 70% stenosed.  · The 1st LPL lesion was 90% stenosed.  · The Prox RCA-2 lesion was 70% stenosed.  · The Prox RCA-1 " lesion was 90% stenosed.  · The Mid LAD-2 lesion was 60% stenosed.  · The estimated blood loss was <50 mL.  · There was three vessel coronary artery disease.           Past Medical History:   Diagnosis Date    CAD (coronary artery disease)     Cancer     colon    CHF (congestive heart failure)     Colitis     Colon cancer     COPD (chronic obstructive pulmonary disease)     Decreased hearing     Diabetes mellitus     Diabetes mellitus, type 2     Hypercholesterolemia     Hypertension     Hypoxemia     Insomnia     Obesity     Osteoarthritis        Past Surgical History:   Procedure Laterality Date    ANGIOGRAM, CORONARY, WITH LEFT HEART CATHETERIZATION N/A 2/10/2022    Procedure: Angiogram, Coronary, with Left Heart Cath;  Surgeon: Cristian Benjamin MD;  Location: [facility] CATH/EP LAB;  Service: Cardiology;  Laterality: N/A;    CARDIAC CATHETERIZATION      CHOLECYSTECTOMY      COLECTOMY      CORONARY ANGIOPLASTY      CORONARY STENT PLACEMENT      EXTERNAL EAR SURGERY      EYE SURGERY      FLEXIBLE SIGMOIDOSCOPY N/A 9/19/2019    Procedure: SIGMOIDOSCOPY, FLEXIBLE;  Surgeon: Biju Kramer III, MD;  Location: [facility] ENDO;  Service: Endoscopy;  Laterality: N/A;    HYSTERECTOMY      partial       Review of patient's allergies indicates:   Allergen Reactions    Iodinated contrast media     Strawberries [strawberry] Hives and Itching       Current Facility-Administered Medications on File Prior to Encounter   Medication    [COMPLETED] metoprolol succinate (TOPROL-XL) 24 hr tablet 25 mg    [COMPLETED] morphine injection 2 mg    [COMPLETED] nitroGLYCERIN (NITROSTAT) 0.4 MG SL tablet    [DISCONTINUED] 0.45% NaCl infusion    [DISCONTINUED] acetaminophen tablet 650 mg    [DISCONTINUED] albuterol inhaler 2 puff    [DISCONTINUED] albuterol-ipratropium 2.5 mg-0.5 mg/3 mL nebulizer solution 3 mL    [DISCONTINUED] albuterol-ipratropium 2.5 mg-0.5 mg/3 mL nebulizer solution 3 mL     [DISCONTINUED] amLODIPine tablet 5 mg    [DISCONTINUED] aspirin EC tablet 81 mg    [DISCONTINUED] atorvastatin tablet 40 mg    [DISCONTINUED] dextrose 50% injection 12.5 g    [DISCONTINUED] dextrose 50% injection 12.5 g    [DISCONTINUED] dextrose 50% injection 25 g    [DISCONTINUED] dextrose 50% injection 25 g    [DISCONTINUED] diphenhydrAMINE injection 12.5 mg    [DISCONTINUED] enoxaparin injection 40 mg    [DISCONTINUED] famotidine (PF) injection 20 mg    [DISCONTINUED] glucagon (human recombinant) injection 1 mg    [DISCONTINUED] glucose chewable tablet 16 g    [DISCONTINUED] glucose chewable tablet 24 g    [DISCONTINUED] heparin 25,000 units in dextrose 5% (100 units/ml) IV bolus from bag - ADDITIONAL PRN BOLUS - 30 units/kg (max bolus 4000 units)    [DISCONTINUED] heparin 25,000 units in dextrose 5% (100 units/ml) IV bolus from bag - ADDITIONAL PRN BOLUS - 60 units/kg (max bolus 4000 units)    [DISCONTINUED] heparin 25,000 units in dextrose 5% (100 units/ml) IV bolus from bag INITIAL BOLUS (max bolus 4000 units)    [DISCONTINUED] heparin 25,000 units in dextrose 5% 250 mL (100 units/mL) infusion LOW INTENSITY nomogram - [facility]    [DISCONTINUED] hydrALAZINE injection 10 mg    [DISCONTINUED] hydrocortisone sodium succinate injection 60 mg    [DISCONTINUED] insulin regular injection 1-12 Units    [DISCONTINUED] lisinopriL tablet 40 mg    [DISCONTINUED] melatonin tablet 6 mg    [DISCONTINUED] metoprolol injection 5 mg    [DISCONTINUED] metoprolol succinate (TOPROL-XL) 24 hr tablet 25 mg    [DISCONTINUED] metoprolol succinate (TOPROL-XL) 24 hr tablet 50 mg    [DISCONTINUED] naloxone 0.4 mg/mL injection 0.02 mg    [DISCONTINUED] nitroGLYCERIN 50 mg in dextrose 5 % 250 mL infusion (TITRATING)    [DISCONTINUED] nitroGLYCERIN 50 mg/250 mL (200 mcg/mL) infusion    [DISCONTINUED] nitroGLYCERIN SL tablet 0.4 mg    [DISCONTINUED] ondansetron disintegrating tablet 8 mg    [DISCONTINUED] ondansetron  injection 4 mg    [DISCONTINUED] pantoprazole EC tablet 40 mg    [DISCONTINUED] senna-docusate 8.6-50 mg per tablet 2 tablet    [DISCONTINUED] sodium chloride 0.9% flush 10 mL    [DISCONTINUED] zolpidem tablet 5 mg     Current Outpatient Medications on File Prior to Encounter   Medication Sig    acetaminophen (TYLENOL) 500 MG tablet Take 500 mg by mouth every 6 (six) hours as needed for Pain.     albuterol (VENTOLIN HFA) 90 mcg/actuation inhaler Inhale 2 puffs into the lungs every 6 (six) hours as needed for Wheezing or Shortness of Breath. Rescue    amLODIPine (NORVASC) 5 MG tablet Take 1 tablet (5 mg total) by mouth once daily.    aspirin (ECOTRIN) 81 MG EC tablet Take 81 mg by mouth every morning.     benazepriL (LOTENSIN) 40 MG tablet Take 1 tablet (40 mg total) by mouth once daily.    bromfenac (PROLENSA) 0.07 % Drop Place 1 drop into both eyes daily as needed.    coenzyme Q10 100 mg capsule Take 100 mg by mouth every morning.    dextran 70-hypromellose 0.1-0.3 % Drop Place 1 drop into both eyes daily as needed.    ergocalciferol (VITAMIN D2) 50,000 unit Cap Take 1 capsule (50,000 Units total) by mouth every 7 days.    fish oil-omega-3 fatty acids 300-1,000 mg capsule Take 1 capsule by mouth every morning.    FLAXSEED OIL ORAL Take 1,200 mg by mouth every morning.    fluticasone-salmeterol diskus inhaler 250-50 mcg Inhale 1 puff into the lungs 2 (two) times daily.    furosemide (LASIX) 20 MG tablet Take 1 tablet (20 mg total) by mouth every other day.    ipratropium-albuteroL (COMBIVENT RESPIMAT)  mcg/actuation inhaler Inhale 2 puffs into the lungs every 4 (four) hours as needed for Shortness of Breath. Rescue    lovastatin (MEVACOR) 40 MG tablet Take 0.5 tablets (20 mg total) by mouth every evening. (Patient taking differently: Take 40 mg by mouth every other day.)    metoprolol succinate (TOPROL-XL) 50 MG 24 hr tablet Take 0.5 tablets (25 mg total) by mouth once daily.    NARCAN 4  mg/actuation Spry     nitroGLYCERIN 0.2 mg/hr TD PT24 (NITRODUR) 0.2 mg/hr APPLY 1 PATCH TOPICALLY ONCE DAILY TO LEG.    nitroGLYCERIN 0.4 MG/DOSE TL SPRY (NITROLINGUAL) 400 mcg/spray spray USE ONE SPRAY UNDER THE TONGUE EVERY 5 MINUTES AS NEEDED FOR CHEST PAIN.  DO NOT EXCEED A TOTAL OF 3 SPRAYS IN 15 MINUTES    ondansetron (ZOFRAN-ODT) 4 MG TbDL Take 2 tablets (8 mg total) by mouth every 6 (six) hours as needed.    pantoprazole (PROTONIX) 40 MG tablet Take 1 tablet (40 mg total) by mouth once daily.    PNV NO.115/IRON FUMARATE/FA (PRENATAL #115-IRON-FOLIC ACID ORAL) Take 1 tablet by mouth every morning.     predniSONE (DELTASONE) 20 MG tablet Take 1 tablet (20 mg total) by mouth As instructed. Take 3 tabs the night before procedure & 3 tabs morning of procedure    temazepam (RESTORIL) 15 mg Cap Take 1 capsule (15 mg total) by mouth every evening.    triamcinolone acetonide 0.1% (KENALOG) 0.1 % cream Apply topically 2 (two) times daily. For legs.    umeclidinium (INCRUSE ELLIPTA) 62.5 mcg/actuation inhalation capsule Inhale 1 capsule (63 mcg total) into the lungs every morning. Controller    vit C/E/Zn/coppr/lutein/zeaxan (PRESERVISION AREDS-2 ORAL) Take 1 capsule by mouth every morning.    [DISCONTINUED] COVID-19 vacc,mRNA,Moderna,/PF (MODERNA COVID-19 VACCINE, EUA, IM) ADMINISTER 0.5ML IN THE MUSCLE AS DIRECTED     Family History     Problem Relation (Age of Onset)    Febrile seizures Mother    Heart failure Father        Tobacco Use    Smoking status: Former Smoker     Packs/day: 3.00     Years: 50.00     Pack years: 150.00     Types: Cigarettes     Start date: 3/30/1964     Quit date: 2/28/2006     Years since quitting: 15.9    Smokeless tobacco: Never Used    Tobacco comment: ex smoker 15 years   Substance and Sexual Activity    Alcohol use: Yes    Drug use: No    Sexual activity: Never     Review of Systems   Constitutional: Positive for malaise/fatigue. Negative for decreased appetite,  fever, weight gain and weight loss.   HENT: Negative for congestion and sore throat.    Eyes: Negative for blurred vision, vision loss in left eye, vision loss in right eye and visual disturbance.   Cardiovascular: Positive for leg swelling. Negative for chest pain, dyspnea on exertion and palpitations.   Respiratory: Negative for cough and shortness of breath (stable on nasal cannula').    Skin: Negative for flushing and rash.   Gastrointestinal: Negative for abdominal pain, constipation, diarrhea, heartburn, nausea and vomiting.   Genitourinary: Negative for dysuria and flank pain.   Neurological: Negative for focal weakness, headaches, light-headedness and weakness.   Psychiatric/Behavioral: Negative for altered mental status and substance abuse. The patient is not nervous/anxious.    Allergic/Immunologic: Negative for environmental allergies and hives.     Objective:     Vital Signs (Most Recent):  Temp: 98.9 °F (37.2 °C) (02/11/22 1605)  Pulse: 79 (02/11/22 1801)  Resp: 20 (02/11/22 1801)  BP: (!) 140/58 (02/11/22 1801)  SpO2: 96 % (02/11/22 1801) Vital Signs (24h Range):  Temp:  [97.9 °F (36.6 °C)-98.9 °F (37.2 °C)] 98.9 °F (37.2 °C)  Pulse:  [] 79  Resp:  [14-47] 20  SpO2:  [92 %-100 %] 96 %  BP: (104-209)/() 140/58     Weight: 89.8 kg (197 lb 15.6 oz)  Body mass index is 35.07 kg/m².    SpO2: 96 %  O2 Device (Oxygen Therapy): nasal cannula      Intake/Output Summary (Last 24 hours) at 2/11/2022 1814  Last data filed at 2/11/2022 1805  Gross per 24 hour   Intake 45.04 ml   Output --   Net 45.04 ml       Lines/Drains/Airways     Peripheral Intravenous Line                 Peripheral IV - Single Lumen 02/10/22 0617 20 G Anterior;Left Forearm 1 day         Peripheral IV - Single Lumen 02/10/22 1744 22 G Posterior;Right Hand 1 day         Peripheral IV - Single Lumen 02/11/22 1630 20 G Anterior;Right Upper Arm <1 day                Physical Exam  Vitals and nursing note reviewed.   Constitutional:        General: She is not in acute distress.     Appearance: Normal appearance. She is obese. She is not ill-appearing, toxic-appearing or diaphoretic.   HENT:      Head: Normocephalic and atraumatic.   Eyes:      General: No scleral icterus.        Right eye: No discharge.         Left eye: No discharge.      Extraocular Movements: Extraocular movements intact.      Conjunctiva/sclera: Conjunctivae normal.   Cardiovascular:      Rate and Rhythm: Normal rate and regular rhythm.      Pulses: Normal pulses.      Heart sounds: Normal heart sounds.   Pulmonary:      Effort: Pulmonary effort is normal. No respiratory distress.      Breath sounds: No wheezing or rales.      Comments: Decreased breath sounds  Breathing on 4L via NC  Chest:      Chest wall: No tenderness.   Abdominal:      General: Abdomen is flat. Bowel sounds are normal. There is no distension.      Palpations: Abdomen is soft.      Tenderness: There is no abdominal tenderness.   Musculoskeletal:         General: No swelling or tenderness. Normal range of motion.      Cervical back: Normal range of motion and neck supple.      Right lower leg: Edema present.      Left lower leg: Edema present.   Skin:     General: Skin is warm and dry.      Capillary Refill: Capillary refill takes less than 2 seconds.      Findings: Erythema (lower extermity venous stasis) present.   Neurological:      General: No focal deficit present.      Mental Status: She is alert. Mental status is at baseline.   Psychiatric:         Mood and Affect: Mood normal.         Behavior: Behavior normal.         Significant Labs:   CMP   Recent Labs   Lab 02/10/22  0538 02/10/22  1806 02/11/22  0312    141 140   K 5.2* 4.9 4.9    101 103   CO2 28 29 30*   * 135* 135*   BUN 30* 29* 32*   CREATININE 1.6* 1.4 1.6*   CALCIUM 9.3 9.4 9.0   PROT  --  6.6 5.8*   ALBUMIN  --  3.6 3.3*   BILITOT  --  0.5 0.6   ALKPHOS  --  55 46*   AST  --  22 29   ALT  --  15 14   ANIONGAP 11 11 7*  "  ESTGFRAFRICA 36.3* 42.7* 36.3*   EGFRNONAA 31.5* 37.0* 31.5*   , CBC   Recent Labs   Lab 02/10/22  0538 02/10/22  0538 02/11/22  0312 02/11/22  0312 02/11/22  1029   WBC 6.67  --  9.48  --  8.82   HGB 13.2  --  10.9*  --  11.0*   HCT 43.8   < > 35.7*   < > 36.4*     --  161  --  167    < > = values in this interval not displayed.    and Troponin   Recent Labs   Lab 02/10/22  1806 02/11/22  1518   TROPONINI 0.278* 3.829*       Significant Imaging:     Transthoracic echo (TTE) complete[02/11/22]  Summary    · The estimated PA systolic pressure is 37 mmHg.  · The left ventricle is normal in size with normal systolic function.  · The estimated ejection fraction is 60%.  · Grade II left ventricular diastolic dysfunction.  · Normal right ventricular size with normal right ventricular systolic function.  · Severe left atrial enlargement.  · Moderate right atrial enlargement.  · Mild mitral regurgitation.  · Mild tricuspid regurgitation.  · Normal central venous pressure (3 mmHg).    Angiogram, Coronary, with Left Heart Cath[02/10/22]    · The RPAV lesion was 100% stenosed.  · The RPDA lesion was 100% stenosed.  · The Prox Cx to Mid Cx lesion was 80% stenosed.  · The Dist Cx lesion was 70% stenosed.  · The 1st LPL lesion was 90% stenosed.  · The Prox RCA-2 lesion was 70% stenosed.  · The Prox RCA-1 lesion was 90% stenosed.  · The Mid LAD-2 lesion was 60% stenosed.  · The estimated blood loss was <50 mL.  · There was three vessel coronary artery disease.           Assessment and Plan:     * NSTEMI (non-ST elevated myocardial infarction)  -Recent hx of increasing episodes of angina requiring more frequent Nitroglycerin use and underwent LHC at OSH.   -At OSH, Episode of 10/10 chest pain all over that she described as "burning", and "veins being ripped apart". Stat EKG at the time showed concerns for  lateral precordial ST depressions. She was initiated on nitroglycerin drip, heparin drip. One dose of  morphine 2mg & " Lopressor 5mg IV were also administered. She reported alleviation of her chest pain after administration. Stable on Nitroglycerin drip. She was transferred to our hospital for further evaluation of her ACS and potential emergent intervention. LHC at outside hospital was notable for significant coraonary artery disease:  Recent Left heart cath[02/11/22] showed:  · The RPAV lesion was 100% stenosed.  · The RPDA lesion was 100% stenosed.  · The Prox Cx to Mid Cx lesion was 80% stenosed.  · The Dist Cx lesion was 70% stenosed.  · The 1st LPL lesion was 90% stenosed.  · The Prox RCA-2 lesion was 70% stenosed.  · The Prox RCA-1 lesion was 90% stenosed.  · The Mid LAD-2 lesion was 60% stenosed.  · The estimated blood loss was <50 mL.  · There was three vessel coronary artery disease.    Recent Labs     02/10/22  1806 02/11/22  1518   TROPONINI 0.278* 3.829*     ANUSHKA score: 6 points, 41% risk at 14 days of: all-cause mortality, new or recurrent MI, or severe recurrent ischemia requiring urgent revascularization.    Plan:  -NTG infusion for chest pain   -Heparin infusion  -Continuous Telemetry monitoring  - 325 mg ASA qd  - Atorvastatin 40mg qd  - Metoprolol 50 mg XL  - Interventional cardiology consulted, appreciate recs  - Cardiothoracic surgery consulted, appreciate recs       Acute on chronic diastolic congestive heart failure  CXR nonconcerning for pleural effusion/pulmonary edema, but concerns for CHF, given patient had recent increasing SOB, BNP elevation, and NSTEMI. TTE notable for Grade II left ventricular diastolic dysfunction.    Transthoracic echo (TTE) complete[02/11/22]  Summary  · The estimated PA systolic pressure is 37 mmHg.  · The left ventricle is normal in size with normal systolic function.  · The estimated ejection fraction is 60%.  · Grade II left ventricular diastolic dysfunction.  · Normal right ventricular size with normal right ventricular systolic function.  · Severe left atrial  enlargement.  · Moderate right atrial enlargement.  · Mild mitral regurgitation.  · Mild tricuspid regurgitation.  · Normal central venous pressure (3 mmHg).    Recent Labs   Lab 02/10/22  1806 02/11/22  1518   TROPONINI 0.278* 3.829*   *  --        Plan:  - Cardiac diet with Fluid restriction at 1.5L with strict I/Os and daily STANDING weights  - Maintain on telemetry  - Check Electrolytes, keep Mag >2 & K+ >4  - Ambulate as tolerated . PT/OT consulted    COPD/emphysema  Known history of severe COPD (GOLD IV D PFT 2020). On home oxygen (2-3L)    Home medications: albuterol (VENTOLIN HFA) 90 mcg/actuation inhaler, ipratropium-albuteroL (COMBIVENT RESPIMAT)  mcg/actuation inhaler,  umeclidinium (INCRUSE ELLIPTA) 62.5 mcg/actuation inhalation capsule, fluticasone-salmeterol diskus inhaler 250-50 mcg      PLAN  VBG PRN  Supplementary oxygen via NC with titration to maintain  O2 sats >90%  Duonebs q6h while awake  Tiotropium inhaler        Hypercholesterolemia  Lab Results   Component Value Date    HDL 53 09/21/2021    LDLCALC 98.2 09/21/2021    CHOL 165 09/21/2021    TRIG 69 09/21/2021     PLAN  Atorvastatin 40mg    Venous stasis  Chronic history of venous stasis of bilateral lower extremities limited to breakdown of skin without varicose veins  Patient denies increase in leg swelling over her baseline.    PLAN  Nursing wound care PRN  Elevate legs    CKD (chronic kidney disease), stage III  Baseline creatinine 1.5- 1.8   Creatinine stable for now.   Recent Labs     02/10/22  0538 02/10/22  1806 02/11/22  0312   CREATININE 1.6* 1.4 1.6*   BUN 30* 29* 32*     Estimated Creatinine Clearance: 32.8 mL/min (A) (based on SCr of 1.6 mg/dL (H)). according to latest data.     PLAN  Monitor UOP and serial BMP and adjust therapy as needed.   Avoid nephrotoxic meds  Renally dose meds.        Gastroesophageal reflux disease without esophagitis  Stable.    PLAN  famotidine tablet 20 mg    Coronary artery disease,  "multivessel with history of previous PCI  See "NSTEMI (non-ST elevated myocardial infarction)" A&P      DM type 2, controlled, with complication  Last A1c:   Lab Results   Component Value Date    HGBA1C 5.2 01/27/2022          Blood Sugars (AccuCheck):  Recent Labs     02/11/22  1430   POCTGLUCOSE 107         PLAN  Antihyperglycemics (From admission, onward)            Start     Stop Route Frequency Ordered    02/11/22 1827  insulin aspart U-100 pen 0-5 Units         -- SubQ Before meals & nightly PRN 02/11/22 1727      - Diabetic diet  - POCT glucose ACHS  - Will monitor glucose results and adjust insulin regimen accordingly      Coronary atherosclerosis  See "NSTEMI (non-ST elevated myocardial infarction)" A&P    Essential hypertension, benign  Vitals:    02/11/22 1506 02/11/22 1545 02/11/22 1605 02/11/22 1705   BP:   (!) 104/56 (!) 142/62   BP Location:   Left arm Left arm   Patient Position:   Lying Lying   Pulse:   70 71   Resp:   20 20   Temp:   98.9 °F (37.2 °C)    TempSrc:   Oral    SpO2:   100% 100%   Weight: 90.5 kg (199 lb 8.3 oz) 89.8 kg (197 lb 15.6 oz)       BP was hypertensive at OSH. Stable on admission after initiation of home anti-hypertensives and  nitroglycerin drip .    Home medications:  amLODIPine (NORVASC) 5 MG, benazepriL (LOTENSIN) 40 MG ,       PLAN  metoprolol succinate (TOPROL-XL) 24 hr tablet 50 mg    amLODIPine tablet 10 mg              VTE Risk Mitigation (From admission, onward)         Ordered     heparin 25,000 units in dextrose 5% (100 units/ml) IV bolus from bag - ADDITIONAL PRN BOLUS - 60 units/kg (max bolus 4000 units)  As needed (PRN)        Question:  Heparin Infusion Adjustment (DO NOT MODIFY ANSWER)  Answer:  [email or web address]\Images\Pharmacy\HeparinInfusions\heparin LOW INTENSITY nomogram for [facility] [id number]    02/11/22 1617     heparin 25,000 units in dextrose 5% (100 units/ml) IV bolus from bag - ADDITIONAL PRN BOLUS - 30 units/kg (max bolus 4000 units)  As needed (PRN)  "       Question:  Heparin Infusion Adjustment (DO NOT MODIFY ANSWER)  Answer:  [email or web address]\Images\Pharmacy\HeparinInfusions\heparin LOW INTENSITY nomogram for [facility] [id number]    02/11/22 1617     heparin 25,000 units in dextrose 5% 250 mL (100 units/mL) infusion LOW INTENSITY nomogram - [facility]  Continuous        Question Answer Comment   Heparin Infusion Adjustment (DO NOT MODIFY ANSWER) [email or web address]\epic\Images\Pharmacy\HeparinInfusions\heparin LOW INTENSITY nomogram for [facility] [id number]    Begin at (in units/kg/hr) 12        02/11/22 1617     Reason for No Pharmacological VTE Prophylaxis  Once        Question:  Reasons:  Answer:  Physician Provided (leave comment)    02/11/22 1446     IP VTE HIGH RISK PATIENT  Once         02/11/22 1446     Place sequential compression device  Until discontinued         02/11/22 1446                Ralph George DO  Cardiology   [facility]

## 2022-02-12 NOTE — ASSESSMENT & PLAN NOTE
"-Recent hx of increasing episodes of angina requiring more frequent Nitroglycerin use and underwent LHC at OSH.   -At OSH, Episode of 10/10 chest pain all over that she described as "burning", and "veins being ripped apart". Stat EKG at the time showed concerns for  lateral precordial ST depressions. She was initiated on nitroglycerin drip, heparin drip. One dose of  morphine 2mg & Lopressor 5mg IV were also administered. She reported alleviation of her chest pain after administration. Stable on Nitroglycerin drip. She was transferred to our hospital for further evaluation of her ACS and potential emergent intervention. LHC at outside hospital was notable for significant coronary artery disease:  Recent Left heart cath[02/11/22] showed:  · The RPAV lesion was 100% stenosed.  · The RPDA lesion was 100% stenosed.  · The Prox Cx to Mid Cx lesion was 80% stenosed.  · The Dist Cx lesion was 70% stenosed.  · The 1st LPL lesion was 90% stenosed.  · The Prox RCA-2 lesion was 70% stenosed.  · The Prox RCA-1 lesion was 90% stenosed.  · The Mid LAD-2 lesion was 60% stenosed.  · The estimated blood loss was <50 mL.  · There was three vessel coronary artery disease.    Recent Labs     02/10/22  1806 02/11/22  1518   TROPONINI 0.278* 3.829*     ANUSHKA score: 6 points, 41% risk at 14 days of: all-cause mortality, new or recurrent MI, or severe recurrent ischemia requiring urgent revascularization.  SYLVESTER Score: 136 points, 13 % probability of death from admission to 6 months      Plan:  -NTG infusion for chest pain   -Heparin infusion  -Continuous Telemetry monitoring  - 325 mg ASA qd  - Atorvastatin 40mg qd  - Metoprolol 50 mg XL  - Ticagrelor 90mg bid  - Interventional cardiology consulted, started patient on prednisone 50 mg, famotidine 20 mg, diphenhydramine 50 in anticipation of LHC      "

## 2022-02-12 NOTE — PLAN OF CARE
Cardiac ICU Care Plan    POC reviewed with Marisol Kerr and family. Questions and concerns addressed. Nitro gtt titrated down to 35mcg/min per MD order. Heparin gtt running per MD order. Next aptt at 2245. Plan for PCI on monday. Pt progressing toward goals. Will continue to monitor. See below and flowsheets for full assessment and VS info.       Neuro:  Suzi Coma Scale  Best Eye Response: 4-->(E4) spontaneous  Best Motor Response: 6-->(M6) obeys commands  Best Verbal Response: 5-->(V5) oriented  Suzi Coma Scale Score: 15  Assessment Qualifiers: patient not sedated/intubated       24 hr Temp:  [97.9 °F (36.6 °C)-98.9 °F (37.2 °C)]      CV:  Rhythm: normal sinus rhythm   DVT prophylaxis: VTE Required Core Measure: Pharmacological prophylaxis initiated/maintained                            Pulses  Right Radial Pulse: 2+ (normal)  Left Radial Pulse: 2+ (normal)  Right Dorsalis Pedis Pulse: 1+ (weak)  Left Dorsalis Pedis Pulse: 1+ (weak)  Right Posterior Tibial Pulse: 1+ (weak)  Left Posterior Tibial Pulse: 1+ (weak)    Resp:  O2 Device (Oxygen Therapy): nasal cannula  Flow (L/min): 4       GI/:  GI prophylaxis: no; none ordered at this time     Last Bowel Movement: 02/09/22      Intake/Output Summary (Last 24 hours) at 2/11/2022 1947  Last data filed at 2/11/2022 1805  Gross per 24 hour   Intake 45.04 ml   Output 600 ml   Net -554.96 ml          Labs/Accuchecks:  Recent Labs   Lab 02/10/22  0538 02/11/22 0312 02/11/22  1029   WBC 6.67 9.48 8.82   RBC 4.64 3.84* 3.90*   HGB 13.2 10.9* 11.0*   HCT 43.8 35.7* 36.4*    161 167      Recent Labs   Lab 02/10/22  0538 02/11/22  1029 02/11/22  1755   INR 1.0 1.1  --    APTT 25.5 126.5* 28.8      Recent Labs     02/11/22 0312      K 4.9   CO2 30*      BUN 32*   CREATININE 1.6*   ALKPHOS 46*   ALT 14   AST 29   BILITOT 0.6       Recent Labs   Lab 02/10/22  1806 02/11/22  1518   TROPONINI 0.278* 3.829*    No results for input(s): PH, PCO2, PO2, HCO3,  POCSATURATED, BE in the last 72 hours.    Electrolytes: N/A - electrolytes WDL  Accuchecks: ACHS    Gtts/LDAs:   heparin (porcine) in D5W 12 Units/kg/hr (02/11/22 1805)    nitroGLYCERIN         Lines/Drains/Airways       Peripheral Intravenous Line                   Peripheral IV - Single Lumen 02/10/22 0617 20 G Anterior;Left Forearm 1 day         Peripheral IV - Single Lumen 02/10/22 1744 22 G Posterior;Right Hand 1 day         Peripheral IV - Single Lumen 02/11/22 1630 20 G Anterior;Right Upper Arm <1 day                    Skin/Wounds     Wounds: No  Skin: Blanchable redness on sacrum upon admission; pillow used to reduce pressure

## 2022-02-12 NOTE — PLAN OF CARE
OT eval completed and POC established 2/12/2022    Problem: Occupational Therapy Goal  Goal: Occupational Therapy Goal  Description: Goals to be met by: 2/26/2022      Patient will increase functional independence with ADLs by performing:    LE Dressing using AE PRN with Stand-by Assistance.  Grooming while standing with Stand-by Assistance.  Toileting from toilet with Stand-by Assistance for hygiene and clothing management.   Supine to sit with Stand-by Assistance.  Step transfer with Stand-by Assistance    Outcome: Ongoing, Progressing

## 2022-02-12 NOTE — PT/OT/SLP EVAL
Occupational Therapy   Co-Evaluation and Treatment    Name: Marisol Kerr  MRN: [id number]  Admitting Diagnosis:  NSTEMI (non-ST elevated myocardial infarction)  Recent Surgery: * No surgery found *      Recommendations:     Discharge Recommendations: nursing facility, skilled  Discharge Equipment Recommendations:   (TBD)  Barriers to discharge:  Decreased caregiver support    Assessment:     Marisol Kerr is a 76 y.o. female with a medical diagnosis of NSTEMI (non-ST elevated myocardial infarction). Patient admitted 2/11/2022 from [place] following left heart cath procedure. She presents with the following performance deficits affecting function: weakness,impaired endurance,impaired self care skills,impaired functional mobilty,gait instability,impaired balance,impaired cardiopulmonary response to activity,decreased lower extremity function,edema. Patient very pleasant and agreeable to therapy evaluation. Patient requires assist of 1 for ADLs and transfers at this time. Patient will continue to benefit from acute skilled therapy intervention to address deficits/underlying impairments and progress towards prior level of function. After discharge, patient would benefit from continued skilled therapy intervention at SNF to progress more towards independence in ADLs and functional mobility before going home.    Rehab Prognosis: Good; patient would benefit from acute skilled OT services to address these deficits and reach maximum level of function.       Plan:     Patient to be seen 4 x/week to address the above listed problems via self-care/home management,therapeutic activities,therapeutic exercises  · Plan of Care Expires: 03/14/22  · Plan of Care Reviewed with: patient    Subjective     Chief Complaint: none stated during evaluation    Occupational Profile:  Living Environment: Patient lives alone in [facility] with 0 NAYE. Patient has a walk-in shower with shower chair.  Previous level of function: Independent with ADLs; patient  reports she doesn't wear socks unless a family member is available to help her get dressed. Patient ambulates with cane.  Roles and Routines: Does not drive  Equipment Used at Home: cane, straight,shower chair,rollator  Assistance upon Discharge: Limited from family    Pain/Comfort:  · Pain Rating 1: 0/10    Patients cultural, spiritual, Voodoo conflicts given the current situation: no    Objective:     Communicated with: RN prior to session.  Patient found HOB elevated with blood pressure cuff,oxygen,peripheral IV,pulse ox (continuous),telemetry,PureWick upon OT entry to room.    Additional staff present: PT present for co-tx as appropriate for patient 2* medical complexities, decreased activity tolerance for 2 sessions, and level of skilled assist needed for task completion.    General Precautions: Standard, fall   Orthopedic Precautions:N/A   Braces: N/A  Respiratory Status: Nasal cannula, flow 4 L/min    Vitals:   · BP sitting EOB: 195/81,   · BP after transfer to chair: 179/89,   · RN aware    Occupational Performance:    Bed Mobility:    · Patient completed Supine to Sit with minimum assistance with HOB elevated  · Patient completed Anterior Scooting to place B feet on floor with min assist    Functional Mobility/Transfers:  · Patient completed Sit <> Stand Transfer with minimum assistance  with  no assistive device   · Patient completed Bed > Chair Transfer using Step Transfer technique with minimum assistance with hand-held assist    Balance:   Sitting: static- supervision, dynamic- SBA   Standing: static- CGA, dynamic- min assist    Activities of Daily Living:  · Grooming: supervision washing face while seated  · Lower Body Dressing: total assistance to don socks; therapist provided education on use of sock aide and played video for patient to see. Patient would benefit from further education on AE.    Cognitive/Visual Perceptual:  Cognitive/Psychosocial Skills:    -       Oriented to:  Person, Place, Time and Situation   -       Follows Commands/attention: Follows multistep commands  -       Communication: clear/fluent  -       Memory: No Deficits noted  -       Safety awareness/insight to disability: intact   -       Mood/Affect/Coping skills/emotional control: Appropriate to situation, Cooperative and Pleasant    Physical Exam:  Upper Extremity Range of Motion:     -       Right Upper Extremity: WFL  -       Left Upper Extremity: WFL  Upper Extremity Strength:    -       Right Upper Extremity: WFL  -       Left Upper Extremity: WFL    AMPAC 6 Click ADL:  AMPAC Total Score: 17    Treatment & Education:   Therapist provided facilitation and instruction of proper body mechanics and fall prevention strategies during tasks listed above.   Instructed patient to sit in bedside chair daily to increase OOB/activity tolerance.   Instructed patient to use call light to have nursing staff assist with needs/transfers.   Discussed OT POC and answered all questions within OT scope of practice.   Whiteboard updated   Education:    Patient left up in chair with all lines intact, call button in reach and RN notified    GOALS:   Multidisciplinary Problems     Occupational Therapy Goals        Problem: Occupational Therapy Goal    Goal Priority Disciplines Outcome Interventions   Occupational Therapy Goal     OT, PT/OT Ongoing, Progressing    Description: Goals to be met by: 2/26/2022      Patient will increase functional independence with ADLs by performing:    LE Dressing using AE PRN with Stand-by Assistance.  Grooming while standing with Stand-by Assistance.  Toileting from toilet with Stand-by Assistance for hygiene and clothing management.   Supine to sit with Stand-by Assistance.  Step transfer with Stand-by Assistance                     History:     Past Medical History:   Diagnosis Date    CAD (coronary artery disease)     Cancer     colon    CHF (congestive heart failure)     Colitis     Colon  cancer     COPD (chronic obstructive pulmonary disease)     Decreased hearing     Diabetes mellitus     Diabetes mellitus, type 2     Hypercholesterolemia     Hypertension     Hypoxemia     Insomnia     Obesity     Osteoarthritis        Past Surgical History:   Procedure Laterality Date    ANGIOGRAM, CORONARY, WITH LEFT HEART CATHETERIZATION N/A 2/10/2022    Procedure: Angiogram, Coronary, with Left Heart Cath;  Surgeon: Cristian Benjamin MD;  Location: [facility] CATH/EP LAB;  Service: Cardiology;  Laterality: N/A;    CARDIAC CATHETERIZATION      CHOLECYSTECTOMY      COLECTOMY      CORONARY ANGIOPLASTY      CORONARY STENT PLACEMENT      EXTERNAL EAR SURGERY      EYE SURGERY      FLEXIBLE SIGMOIDOSCOPY N/A 9/19/2019    Procedure: SIGMOIDOSCOPY, FLEXIBLE;  Surgeon: Biju Kramer III, MD;  Location: [facility] ENDO;  Service: Endoscopy;  Laterality: N/A;    HYSTERECTOMY      partial       Time Tracking:     OT Date of Treatment: 02/12/22  OT Start Time: 0951  OT Stop Time: 1019  OT Total Time (min): 28 min    Billable Minutes:Evaluation 15  Self Care/Home Management 8    2/12/2022

## 2022-02-12 NOTE — SUBJECTIVE & OBJECTIVE
Interval History: Patient seen and examined. No acute events overnight, afebrile, vital signs stable. This morning, labs notable for hyperkalemia so Lasix IV and calcium gluconate administered. Denies chest pain overnight. This morning, CTS evaluation did not recommend patient for CABG due to her debility as she is prohibitively high risk for CABG. Loading Ticagrelor today.      Review of Systems   Constitutional: Negative for decreased appetite, fever, weight gain and weight loss.   HENT: Negative for congestion and sore throat.    Eyes: Negative for blurred vision, vision loss in left eye, vision loss in right eye and visual disturbance.   Cardiovascular: Negative for chest pain, leg swelling (at baseline) and palpitations.   Respiratory: Negative for cough and shortness of breath (stable on nasal cannula).    Skin: Negative for flushing and rash.   Gastrointestinal: Negative for abdominal pain, constipation, diarrhea, heartburn, nausea and vomiting.   Genitourinary: Negative for dysuria and flank pain.   Neurological: Negative for focal weakness, headaches, light-headedness and weakness.   Psychiatric/Behavioral: Negative for altered mental status and substance abuse. The patient is not nervous/anxious.    Allergic/Immunologic: Negative for environmental allergies and hives.     Objective:     Vital Signs (Most Recent):  Temp: 98 °F (36.7 °C) (02/12/22 0705)  Pulse: 78 (02/12/22 1105)  Resp: 20 (02/12/22 1005)  BP: (!) 133/58 (02/12/22 1105)  SpO2: 97 % (02/12/22 1105) Vital Signs (24h Range):  Temp:  [98 °F (36.7 °C)-98.9 °F (37.2 °C)] 98 °F (36.7 °C)  Pulse:  [54-94] 78  Resp:  [15-41] 20  SpO2:  [95 %-100 %] 97 %  BP: ()/() 133/58     Weight: 89.8 kg (197 lb 15.6 oz)  Body mass index is 35.07 kg/m².     SpO2: 97 %  O2 Device (Oxygen Therapy): nasal cannula      Intake/Output Summary (Last 24 hours) at 2/12/2022 1208  Last data filed at 2/12/2022 1105  Gross per 24 hour   Intake 706.08 ml   Output  1900 ml   Net -1193.92 ml       Lines/Drains/Airways     Peripheral Intravenous Line                 Peripheral IV - Single Lumen 02/10/22 1744 22 G Posterior;Right Hand 1 day         Peripheral IV - Single Lumen 02/11/22 1500 20 G Anterior;Right Upper Arm <1 day                Physical Exam  Vitals and nursing note reviewed.   Constitutional:       General: She is not in acute distress.     Appearance: Normal appearance. She is obese. She is not ill-appearing, toxic-appearing or diaphoretic.   HENT:      Head: Normocephalic and atraumatic.   Eyes:      General: No scleral icterus.        Right eye: No discharge.         Left eye: No discharge.      Extraocular Movements: Extraocular movements intact.      Conjunctiva/sclera: Conjunctivae normal.   Cardiovascular:      Rate and Rhythm: Normal rate and regular rhythm.      Pulses: Normal pulses.      Heart sounds: Normal heart sounds.   Pulmonary:      Effort: Pulmonary effort is normal. No respiratory distress.      Breath sounds: No wheezing or rales.      Comments: Decreased breath sounds  Breathing on 2L via NC  Chest:      Chest wall: No tenderness.   Abdominal:      General: Abdomen is flat. Bowel sounds are normal. There is no distension.      Palpations: Abdomen is soft.      Tenderness: There is no abdominal tenderness.   Musculoskeletal:         General: No swelling or tenderness. Normal range of motion.      Cervical back: Normal range of motion and neck supple.      Right lower leg: Edema present.      Left lower leg: Edema present.   Skin:     General: Skin is warm and dry.      Capillary Refill: Capillary refill takes less than 2 seconds.      Findings: Erythema (lower extermity venous stasis) present.   Neurological:      General: No focal deficit present.      Mental Status: She is alert. Mental status is at baseline.   Psychiatric:         Mood and Affect: Mood normal.         Behavior: Behavior normal.         Significant Labs:   CMP   Recent Labs    Lab 02/10/22  1806 02/11/22  0312 02/12/22  0536    140 139   K 4.9 4.9 5.7*    103 106   CO2 29 30* 24   * 135* 140*   BUN 29* 32* 39*   CREATININE 1.4 1.6* 1.7*   CALCIUM 9.4 9.0 8.9   PROT 6.6 5.8* 6.0   ALBUMIN 3.6 3.3* 3.1*   BILITOT 0.5 0.6 0.4   ALKPHOS 55 46* 61   AST 22 29 25   ALT 15 14 13   ANIONGAP 11 7* 9   ESTGFRAFRICA 42.7* 36.3* 33.8*   EGFRNONAA 37.0* 31.5* 29.3*    and CBC   Recent Labs   Lab 02/11/22 0312 02/11/22  0312 02/11/22  1029 02/11/22  1029 02/12/22  0536   WBC 9.48  --  8.82  --  9.64   HGB 10.9*  --  11.0*  --  10.9*   HCT 35.7*   < > 36.4*   < > 36.4*     --  167  --  146*    < > = values in this interval not displayed.       Significant Imaging: Reviewed

## 2022-02-12 NOTE — PT/OT/SLP EVAL
"Physical Therapy Evaluation  Co-evaluation with OT due to acuity of condition, level of skilled assist needed for assessment of safety with mobility.   Patient Name:  Marisol Kerr   MRN:  [id number]    Recommendations:     Discharge Recommendations:  nursing facility, skilled   Discharge Equipment Recommendations: other (see comments) (TBD pending progress)   Barriers to discharge: Decreased caregiver support and patient below functional baseline, risk of falls    Assessment:     Marisol Kerr is a 76 y.o. female admitted with a medical diagnosis of NSTEMI (non-ST elevated myocardial infarction).  She presents with the following impairments/functional limitations:  weakness,gait instability,impaired endurance,impaired self care skills,impaired functional mobilty,decreased lower extremity function,decreased upper extremity function,edema,impaired cardiopulmonary response to activity. The patient demonstrates generalized weakness and deconditioning. She ambulated 4' to chair with ELBERT HHA and minimum assistance. Her mobility was further limited due to hypertension, RN aware. She is modified independent at baseline using canes and lives alone. She is not safe to return home due to risk of falls. She would benefit from SNF placement to address the above deficits and maximize their functional mobility.      Rehab Prognosis: Good; patient would benefit from acute skilled PT services to address these deficits and reach maximum level of function.    Recent Surgery: * No surgery found *      Plan:     During this hospitalization, patient to be seen 4 x/week to address the identified rehab impairments via gait training,therapeutic activities,therapeutic exercises,neuromuscular re-education and progress toward the following goals:    · Plan of Care Expires:  03/14/22    Subjective     Chief Complaint: "It feels better sitting up and getting out of bed"  Patient/Family Comments/goals: maintain independence, return " home  Pain/Comfort:  Pain Rating 1: 0/10    Patients cultural, spiritual, Adventist conflicts given the current situation: no    Living Environment:  The patient lives alone, [facility], 1 NAYE, [place]. Does not drive.   Prior to admission, patients level of function was modified independent with use of canes, rollator does not fit into bathroom.  Equipment used at home: shower chair,rollator,cane, straight (hurrycane).  DME owned (not currently used): none.  Upon discharge, patient will have assistance from- limited family support.    Objective:     Communicated with RN prior to session.  Patient found HOB elevated with blood pressure cuff,pulse ox (continuous),telemetry,peripheral IV,oxygen,PureWick  upon PT entry to room.    General Precautions: Standard, fall   Orthopedic Precautions:N/A   Braces: N/A  Respiratory Status: Nasal cannula, flow 4 L/min  Patient with productive cough throughout session, coughing up thick clear mucus, RN aware    Exams:    Cognitive Exam  Patient is A&O x4 and follows 100% of one -step commands    Fine Motor Coordination   -       WNL     Postural Exam Patient presented with the following abnormalities:    -       Rounded shoulders  -       Forward head  -       Kyphosis  -       Posterior pelvic tilt  -        Sensation    -       Light touch intact ELBERT LE   Skin Integrity/Edema     -       Skin integrity: visibly intact  -       Edema: ELBERT lower legs   R LE ROM WNL   R LE Strength WNL   L LE ROM WNL   L LE Strength  WNL       Balance   Static Sitting supervision assistance    Dynamic Sitting stand by assistance    Static Standing contact guard assist HHA   Dynamic Standing       minimum assistance HHA          Functional Mobility:      Bed Mobility  Supine to Sit on the R side:  minimum assistance, cues for sequencing, HOB elevated, use of rails   Transfers Sit to Stand:  minimum assistance   Gait  Gait Distance: 4 ft with L HHA  Assistance Level: minimum assistance   Description: wide DOUG,  decreased esther and step length, increased time in double limb stance, increased lateral weight shift   BP in sitting 2 min 195/81,   BP in sitting re-cycled 179/89,        Therapeutic Activities and Exercises:   Patient educated on role of therapy, goals of session, benefits of out of bed mobility. Patient agreeable to mobilize with therapy.  Discussed PT plan of care during hospitalization. Patient educated that they need to call for assistance to mobilize out of bed. Whiteboard updated as appropriate. Patient educated on how their diagnosis impacts their mobility within PT scope of practice.     Gait training: cued for pacing and self-monitoring with activity, cued and facilitation for upright posture and weight shift    Patient educated on PT schedule.  Encouraged patient to ambulate, sit up in chair 3x/day to prevent deconditioning during hospitalization. Patient verbalized understanding and agreement not to mobilize without RN assist. Patient in agreement with PT POC.      The patient is safe to transfer with RN assist x1 person, RN alerted.     AM-PAC 6 CLICK MOBILITY  Total Score:16     Patient left up in chair with all lines intact, call button in reach and RN notified.    GOALS:   Multidisciplinary Problems     Physical Therapy Goals        Problem: Physical Therapy Goal    Goal Priority Disciplines Outcome Goal Variances Interventions   Physical Therapy Goal     PT, PT/OT Ongoing, Progressing     Description: Goals to be met by:      Patient will increase functional independence with mobility by performin. Supine to sit with Modified [place]  2. Sit to supine with Modified [place]  3. Sit to stand transfer with Modified [place]  4. Gait  x 50 feet with Modified [place] using LRAD.   5. Ascend/Descend 6 inch curb step with Contact Guard Assistance using LRAD.                     History:     Past Medical History:   Diagnosis Date    CAD (coronary artery  disease)     Cancer     colon    CHF (congestive heart failure)     Colitis     Colon cancer     COPD (chronic obstructive pulmonary disease)     Decreased hearing     Diabetes mellitus     Diabetes mellitus, type 2     Hypercholesterolemia     Hypertension     Hypoxemia     Insomnia     Obesity     Osteoarthritis        Past Surgical History:   Procedure Laterality Date    ANGIOGRAM, CORONARY, WITH LEFT HEART CATHETERIZATION N/A 2/10/2022    Procedure: Angiogram, Coronary, with Left Heart Cath;  Surgeon: Cristian Benjamin MD;  Location: [facility] CATH/EP LAB;  Service: Cardiology;  Laterality: N/A;    CARDIAC CATHETERIZATION      CHOLECYSTECTOMY      COLECTOMY      CORONARY ANGIOPLASTY      CORONARY STENT PLACEMENT      EXTERNAL EAR SURGERY      EYE SURGERY      FLEXIBLE SIGMOIDOSCOPY N/A 9/19/2019    Procedure: SIGMOIDOSCOPY, FLEXIBLE;  Surgeon: Biju Kramer III, MD;  Location: [facility] ENDO;  Service: Endoscopy;  Laterality: N/A;    HYSTERECTOMY      partial       Time Tracking:     PT Received On: 02/12/22  PT Start Time: 0950     PT Stop Time: 1020  PT Total Time (min): 30 min     Billable Minutes: Evaluation 10 and Gait Training 14      02/12/2022

## 2022-02-12 NOTE — PROGRESS NOTES
[facility] - Cardiac Intensive Care  Cardiology  Progress Note    Patient Name: Marisol Kerr  MRN: [id number]  Admission Date: 2/11/2022  Hospital Length of Stay: 1 days  Code Status: Full Code   Attending Physician: Karl Ontiveros MD   Primary Care Physician: Khadar Dwyer MD  Expected Discharge Date:   Principal Problem:NSTEMI (non-ST elevated myocardial infarction)    Subjective:     Hospital Course:   Patient admitted to CCU while pending evaluation for ACS intervention, given her NSTEMI. Interventional Cardiology and Cardiothoracic Surgery consulted. Initiated guideline directed medical therapy for ACS. Nitroglycerin infusion for chest pain alleviation. Given contrast allergy, Interventional Cards started on prednisone 50 mg, famotidine 20, diphenhydramine 50 in anticipation of [facility]. CTS evaluation did not recommend patient for CABG due to her debility as she is prohibitively high risk for CABG.      Interval History: Patient seen and examined. No acute events overnight, afebrile, vital signs stable. This morning, labs notable for hyperkalemia so Lasix IV and calcium gluconate administered. Denies chest pain overnight. This morning, CTS evaluation did not recommend patient for CABG due to her debility as she is prohibitively high risk for CABG. Loading Ticagrelor today.      Review of Systems   Constitutional: Negative for decreased appetite, fever, weight gain and weight loss.   HENT: Negative for congestion and sore throat.    Eyes: Negative for blurred vision, vision loss in left eye, vision loss in right eye and visual disturbance.   Cardiovascular: Negative for chest pain, leg swelling (at baseline) and palpitations.   Respiratory: Negative for cough and shortness of breath (stable on nasal cannula).    Skin: Negative for flushing and rash.   Gastrointestinal: Negative for abdominal pain, constipation, diarrhea, heartburn, nausea and vomiting.   Genitourinary: Negative for dysuria and flank pain.    Neurological: Negative for focal weakness, headaches, light-headedness and weakness.   Psychiatric/Behavioral: Negative for altered mental status and substance abuse. The patient is not nervous/anxious.    Allergic/Immunologic: Negative for environmental allergies and hives.     Objective:     Vital Signs (Most Recent):  Temp: 98 °F (36.7 °C) (02/12/22 0705)  Pulse: 78 (02/12/22 1105)  Resp: 20 (02/12/22 1005)  BP: (!) 133/58 (02/12/22 1105)  SpO2: 97 % (02/12/22 1105) Vital Signs (24h Range):  Temp:  [98 °F (36.7 °C)-98.9 °F (37.2 °C)] 98 °F (36.7 °C)  Pulse:  [54-94] 78  Resp:  [15-41] 20  SpO2:  [95 %-100 %] 97 %  BP: ()/() 133/58     Weight: 89.8 kg (197 lb 15.6 oz)  Body mass index is 35.07 kg/m².     SpO2: 97 %  O2 Device (Oxygen Therapy): nasal cannula      Intake/Output Summary (Last 24 hours) at 2/12/2022 1208  Last data filed at 2/12/2022 1105  Gross per 24 hour   Intake 706.08 ml   Output 1900 ml   Net -1193.92 ml       Lines/Drains/Airways     Peripheral Intravenous Line                 Peripheral IV - Single Lumen 02/10/22 1744 22 G Posterior;Right Hand 1 day         Peripheral IV - Single Lumen 02/11/22 1500 20 G Anterior;Right Upper Arm <1 day                Physical Exam  Vitals and nursing note reviewed.   Constitutional:       General: She is not in acute distress.     Appearance: Normal appearance. She is obese. She is not ill-appearing, toxic-appearing or diaphoretic.   HENT:      Head: Normocephalic and atraumatic.   Eyes:      General: No scleral icterus.        Right eye: No discharge.         Left eye: No discharge.      Extraocular Movements: Extraocular movements intact.      Conjunctiva/sclera: Conjunctivae normal.   Cardiovascular:      Rate and Rhythm: Normal rate and regular rhythm.      Pulses: Normal pulses.      Heart sounds: Normal heart sounds.   Pulmonary:      Effort: Pulmonary effort is normal. No respiratory distress.      Breath sounds: No wheezing or rales.       "Comments: Decreased breath sounds  Breathing on 2L via NC  Chest:      Chest wall: No tenderness.   Abdominal:      General: Abdomen is flat. Bowel sounds are normal. There is no distension.      Palpations: Abdomen is soft.      Tenderness: There is no abdominal tenderness.   Musculoskeletal:         General: No swelling or tenderness. Normal range of motion.      Cervical back: Normal range of motion and neck supple.      Right lower leg: Edema present.      Left lower leg: Edema present.   Skin:     General: Skin is warm and dry.      Capillary Refill: Capillary refill takes less than 2 seconds.      Findings: Erythema (lower extermity venous stasis) present.   Neurological:      General: No focal deficit present.      Mental Status: She is alert. Mental status is at baseline.   Psychiatric:         Mood and Affect: Mood normal.         Behavior: Behavior normal.         Significant Labs:   CMP   Recent Labs   Lab 02/10/22  1806 02/11/22 0312 02/12/22  0536    140 139   K 4.9 4.9 5.7*    103 106   CO2 29 30* 24   * 135* 140*   BUN 29* 32* 39*   CREATININE 1.4 1.6* 1.7*   CALCIUM 9.4 9.0 8.9   PROT 6.6 5.8* 6.0   ALBUMIN 3.6 3.3* 3.1*   BILITOT 0.5 0.6 0.4   ALKPHOS 55 46* 61   AST 22 29 25   ALT 15 14 13   ANIONGAP 11 7* 9   ESTGFRAFRICA 42.7* 36.3* 33.8*   EGFRNONAA 37.0* 31.5* 29.3*    and CBC   Recent Labs   Lab 02/11/22 0312 02/11/22  0312 02/11/22  1029 02/11/22  1029 02/12/22  0536   WBC 9.48  --  8.82  --  9.64   HGB 10.9*  --  11.0*  --  10.9*   HCT 35.7*   < > 36.4*   < > 36.4*     --  167  --  146*    < > = values in this interval not displayed.       Significant Imaging: Reviewed    Assessment and Plan:     * NSTEMI (non-ST elevated myocardial infarction)  -Recent hx of increasing episodes of angina requiring more frequent Nitroglycerin use and underwent LHC at OSH.   -At OSH, Episode of 10/10 chest pain all over that she described as "burning", and "veins being ripped " "apart". Stat EKG at the time showed concerns for  lateral precordial ST depressions. She was initiated on nitroglycerin drip, heparin drip. One dose of  morphine 2mg & Lopressor 5mg IV were also administered. She reported alleviation of her chest pain after administration. Stable on Nitroglycerin drip. She was transferred to our hospital for further evaluation of her ACS and potential emergent intervention. LHC at outside hospital was notable for significant coronary artery disease:  Recent Left heart cath[02/11/22] showed:  · The RPAV lesion was 100% stenosed.  · The RPDA lesion was 100% stenosed.  · The Prox Cx to Mid Cx lesion was 80% stenosed.  · The Dist Cx lesion was 70% stenosed.  · The 1st LPL lesion was 90% stenosed.  · The Prox RCA-2 lesion was 70% stenosed.  · The Prox RCA-1 lesion was 90% stenosed.  · The Mid LAD-2 lesion was 60% stenosed.  · The estimated blood loss was <50 mL.  · There was three vessel coronary artery disease.    Recent Labs     02/10/22  1806 02/11/22  1518   TROPONINI 0.278* 3.829*     ANUSHKA score: 6 points, 41% risk at 14 days of: all-cause mortality, new or recurrent MI, or severe recurrent ischemia requiring urgent revascularization.  SYLVESTER Score: 136 points, 13 % probability of death from admission to 6 months      Plan:  -NTG infusion for chest pain   -Heparin infusion  -Continuous Telemetry monitoring  - 325 mg ASA qd  - Atorvastatin 40mg qd  - Metoprolol 50 mg XL  - Ticagrelor 90mg bid  - Interventional cardiology consulted, started patient on prednisone 50 mg, famotidine 20 mg, diphenhydramine 50 in anticipation of [facility]        Acute on chronic diastolic congestive heart failure  CXR nonconcerning for pleural effusion/pulmonary edema, but concerns for CHF, given patient had recent increasing SOB, BNP elevation, and NSTEMI. TTE notable for Grade II left ventricular diastolic dysfunction.    Transthoracic echo (TTE) complete[02/11/22]  Summary  · The estimated PA systolic pressure " is 37 mmHg.  · The left ventricle is normal in size with normal systolic function.  · The estimated ejection fraction is 60%.  · Grade II left ventricular diastolic dysfunction.  · Normal right ventricular size with normal right ventricular systolic function.  · Severe left atrial enlargement.  · Moderate right atrial enlargement.  · Mild mitral regurgitation.  · Mild tricuspid regurgitation.  · Normal central venous pressure (3 mmHg).    Recent Labs   Lab 02/10/22  1806 02/11/22  1518   TROPONINI 0.278* 3.829*   *  --        Plan:  - Cardiac diet with Fluid restriction at 1.5L with strict I/Os and daily STANDING weights  - Maintain on telemetry  - Check Electrolytes, keep Mag >2 & K+ >4  - Ambulate as tolerated . PT/OT consulted    COPD/emphysema  Known history of severe COPD (GOLD IV D PFT 2020). On home oxygen (2-3L)    Home medications: albuterol (VENTOLIN HFA) 90 mcg/actuation inhaler, ipratropium-albuteroL (COMBIVENT RESPIMAT)  mcg/actuation inhaler,  umeclidinium (INCRUSE ELLIPTA) 62.5 mcg/actuation inhalation capsule, fluticasone-salmeterol diskus inhaler 250-50 mcg      PLAN  VBG PRN  Supplementary oxygen via NC with titration to maintain  O2 sats >90%  Duonebs q6h while awake  Tiotropium inhaler        Hypercholesterolemia  Lab Results   Component Value Date    HDL 53 09/21/2021    LDLCALC 98.2 09/21/2021    CHOL 165 09/21/2021    TRIG 69 09/21/2021     PLAN  Atorvastatin 40mg    Venous stasis  Chronic history of venous stasis of bilateral lower extremities limited to breakdown of skin without varicose veins  Patient denies increase in leg swelling over her baseline.    PLAN  Nursing wound care PRN  Elevate legs    CKD (chronic kidney disease), stage III  Baseline creatinine 1.5- 1.8   Creatinine uptrending, but stable for now.   Recent Labs     02/10/22  1806 02/11/22  0312 02/12/22  0536   CREATININE 1.4 1.6* 1.7*   BUN 29* 32* 39*     Estimated Creatinine Clearance: 30.9 mL/min (A) (based  "on SCr of 1.7 mg/dL (H)). according to latest data.     PLAN  Monitor UOP and serial BMP and adjust therapy as needed.   Avoid nephrotoxic meds  Renally dose meds.        Gastroesophageal reflux disease without esophagitis  Stable.    PLAN  famotidine tablet 20 mg    Coronary artery disease, multivessel with history of previous PCI  See "NSTEMI (non-ST elevated myocardial infarction)" A&P      DM type 2, controlled, with complication  Last A1c:   Lab Results   Component Value Date    HGBA1C 5.2 01/27/2022          Blood Sugars (AccuCheck):    Recent Labs     02/11/22  1430 02/11/22  1802 02/11/22  2135 02/12/22  0807   POCTGLUCOSE 107 97 85 161*         PLAN  Antihyperglycemics (From admission, onward)            Start     Stop Route Frequency Ordered    02/11/22 1827  insulin aspart U-100 pen 0-5 Units         -- SubQ Before meals & nightly PRN 02/11/22 1727      - Diabetic diet  - POCT glucose ACHS  - Will monitor glucose results and adjust insulin regimen accordingly      Coronary atherosclerosis  See "NSTEMI (non-ST elevated myocardial infarction)" A&P    Essential hypertension, benign  Vitals:    02/11/22 1506 02/11/22 1545 02/11/22 1605 02/11/22 1705   BP:   (!) 104/56 (!) 142/62   BP Location:   Left arm Left arm   Patient Position:   Lying Lying   Pulse:   70 71   Resp:   20 20   Temp:   98.9 °F (37.2 °C)    TempSrc:   Oral    SpO2:   100% 100%   Weight: 90.5 kg (199 lb 8.3 oz) 89.8 kg (197 lb 15.6 oz)       BP was hypertensive at OSH. Stable on admission after initiation of home anti-hypertensives and  nitroglycerin drip .    Home medications:  amLODIPine (NORVASC) 5 MG, benazepriL (LOTENSIN) 40 MG ,       PLAN  metoprolol succinate (TOPROL-XL) 24 hr tablet 50 mg    amLODIPine tablet 10 mg              VTE Risk Mitigation (From admission, onward)         Ordered     heparin 25,000 units in dextrose 5% (100 units/ml) IV bolus from bag - ADDITIONAL PRN BOLUS - 60 units/kg (max bolus 4000 units)  As needed " (PRN)        Question:  Heparin Infusion Adjustment (DO NOT MODIFY ANSWER)  Answer:  [email or web address]\epic\Images\Pharmacy\HeparinInfusions\heparin LOW INTENSITY nomogram for [facility] [email or web address]    02/11/22 1617     heparin 25,000 units in dextrose 5% (100 units/ml) IV bolus from bag - ADDITIONAL PRN BOLUS - 30 units/kg (max bolus 4000 units)  As needed (PRN)        Question:  Heparin Infusion Adjustment (DO NOT MODIFY ANSWER)  Answer:  [email or web address]\epic\Images\Pharmacy\HeparinInfusions\heparin LOW INTENSITY nomogram for [facility] [id number]    02/11/22 1617     heparin 25,000 units in dextrose 5% 250 mL (100 units/mL) infusion LOW INTENSITY nomogram - [facility]  Continuous        Question Answer Comment   Heparin Infusion Adjustment (DO NOT MODIFY ANSWER) [email or web address]\epic\Images\Pharmacy\HeparinInfusions\heparin LOW INTENSITY nomogram for [facility] [id number]    Begin at (in units/kg/hr) 12        02/11/22 1617     Reason for No Pharmacological VTE Prophylaxis  Once        Question:  Reasons:  Answer:  Physician Provided (leave comment)    02/11/22 1446     IP VTE HIGH RISK PATIENT  Once         02/11/22 1446     Place sequential compression device  Until discontinued         02/11/22 1446                Ralph George DO  Cardiology  [facility] - Cardiac Intensive Care

## 2022-02-12 NOTE — PLAN OF CARE
Problem: Physical Therapy Goal  Goal: Physical Therapy Goal  Description: Goals to be met by:      Patient will increase functional independence with mobility by performin. Supine to sit with Modified [place]  2. Sit to supine with Modified [place]  3. Sit to stand transfer with Modified [place]  4. Gait  x 50 feet with Modified [place] using LRAD.   5. Ascend/Descend 6 inch curb step with Contact Guard Assistance using LRAD.    Outcome: Ongoing, Progressing   Evaluation completed, initiated plan of care.   Ellie Meeks, PT  2022

## 2022-02-12 NOTE — ASSESSMENT & PLAN NOTE
Last A1c:   Lab Results   Component Value Date    HGBA1C 5.2 01/27/2022          Blood Sugars (AccuCheck):    Recent Labs     02/11/22  1430 02/11/22  1802 02/11/22  2135 02/12/22  0807   POCTGLUCOSE 107 97 85 161*         PLAN  Antihyperglycemics (From admission, onward)            Start     Stop Route Frequency Ordered    02/11/22 1827  insulin aspart U-100 pen 0-5 Units         -- SubQ Before meals & nightly PRN 02/11/22 1727      - Diabetic diet  - POCT glucose ACHS  - Will monitor glucose results and adjust insulin regimen accordingly

## 2022-02-13 LAB
ALBUMIN SERPL BCP-MCNC: 3.2 G/DL (ref 3.5–5.2)
ALP SERPL-CCNC: 78 U/L (ref 55–135)
ALT SERPL W/O P-5'-P-CCNC: 16 U/L (ref 10–44)
ANION GAP SERPL CALC-SCNC: 13 MMOL/L (ref 8–16)
APTT BLDCRRT: 27.6 SEC (ref 21–32)
APTT BLDCRRT: 34.2 SEC (ref 21–32)
APTT BLDCRRT: 35.8 SEC (ref 21–32)
APTT BLDCRRT: 44.7 SEC (ref 21–32)
ASCENDING AORTA: 2.24 CM
AST SERPL-CCNC: 20 U/L (ref 10–40)
AV INDEX (PROSTH): 0.75
AV MEAN GRADIENT: 8 MMHG
AV PEAK GRADIENT: 13 MMHG
AV VALVE AREA: 2.36 CM2
AV VELOCITY RATIO: 0.67
BASOPHILS # BLD AUTO: 0.01 K/UL (ref 0–0.2)
BASOPHILS NFR BLD: 0.1 % (ref 0–1.9)
BILIRUB SERPL-MCNC: 0.4 MG/DL (ref 0.1–1)
BSA FOR ECHO PROCEDURE: 1.99 M2
BUN SERPL-MCNC: 47 MG/DL (ref 8–23)
CALCIUM SERPL-MCNC: 9.4 MG/DL (ref 8.7–10.5)
CHLORIDE SERPL-SCNC: 103 MMOL/L (ref 95–110)
CO2 SERPL-SCNC: 24 MMOL/L (ref 23–29)
CREAT SERPL-MCNC: 1.7 MG/DL (ref 0.5–1.4)
CV ECHO LV RWT: 0.38 CM
DIFFERENTIAL METHOD: ABNORMAL
DOP CALC AO PEAK VEL: 1.8 M/S
DOP CALC AO VTI: 36.43 CM
DOP CALC LVOT AREA: 3.1 CM2
DOP CALC LVOT DIAMETER: 2 CM
DOP CALC LVOT PEAK VEL: 1.21 M/S
DOP CALC LVOT STROKE VOLUME: 86 CM3
DOP CALC MV VTI: 28.95 CM
DOP CALCLVOT PEAK VEL VTI: 27.39 CM
E WAVE DECELERATION TIME: 194.92 MSEC
E/A RATIO: 0.91
E/E' RATIO: 18.8 M/S
ECHO LV POSTERIOR WALL: 0.89 CM (ref 0.6–1.1)
EJECTION FRACTION: 65 %
EOSINOPHIL # BLD AUTO: 0 K/UL (ref 0–0.5)
EOSINOPHIL NFR BLD: 0 % (ref 0–8)
ERYTHROCYTE [DISTWIDTH] IN BLOOD BY AUTOMATED COUNT: 12.5 % (ref 11.5–14.5)
EST. GFR  (AFRICAN AMERICAN): 33.8 ML/MIN/1.73 M^2
EST. GFR  (NON AFRICAN AMERICAN): 29.3 ML/MIN/1.73 M^2
FRACTIONAL SHORTENING: 44 % (ref 28–44)
GLUCOSE SERPL-MCNC: 171 MG/DL (ref 70–110)
HCT VFR BLD AUTO: 35.3 % (ref 37–48.5)
HGB BLD-MCNC: 10.6 G/DL (ref 12–16)
IMM GRANULOCYTES # BLD AUTO: 0.08 K/UL (ref 0–0.04)
IMM GRANULOCYTES NFR BLD AUTO: 0.7 % (ref 0–0.5)
INTERVENTRICULAR SEPTUM: 0.86 CM (ref 0.6–1.1)
IVRT: 91.34 MSEC
LA MAJOR: 5.64 CM
LA MINOR: 5.64 CM
LA WIDTH: 4.64 CM
LEFT ATRIUM SIZE: 4.33 CM
LEFT ATRIUM VOLUME INDEX MOD: 41 ML/M2
LEFT ATRIUM VOLUME INDEX: 50.2 ML/M2
LEFT ATRIUM VOLUME MOD: 78.65 CM3
LEFT ATRIUM VOLUME: 96.32 CM3
LEFT INTERNAL DIMENSION IN SYSTOLE: 2.64 CM (ref 2.1–4)
LEFT VENTRICLE DIASTOLIC VOLUME INDEX: 54.51 ML/M2
LEFT VENTRICLE DIASTOLIC VOLUME: 104.66 ML
LEFT VENTRICLE MASS INDEX: 73 G/M2
LEFT VENTRICLE SYSTOLIC VOLUME INDEX: 13.3 ML/M2
LEFT VENTRICLE SYSTOLIC VOLUME: 25.44 ML
LEFT VENTRICULAR INTERNAL DIMENSION IN DIASTOLE: 4.74 CM (ref 3.5–6)
LEFT VENTRICULAR MASS: 139.43 G
LV LATERAL E/E' RATIO: 18.8 M/S
LV SEPTAL E/E' RATIO: 18.8 M/S
LYMPHOCYTES # BLD AUTO: 0.4 K/UL (ref 1–4.8)
LYMPHOCYTES NFR BLD: 3.6 % (ref 18–48)
MAGNESIUM SERPL-MCNC: 2.1 MG/DL (ref 1.6–2.6)
MCH RBC QN AUTO: 28 PG (ref 27–31)
MCHC RBC AUTO-ENTMCNC: 30 G/DL (ref 32–36)
MCV RBC AUTO: 93 FL (ref 82–98)
MONOCYTES # BLD AUTO: 0.3 K/UL (ref 0.3–1)
MONOCYTES NFR BLD: 2.3 % (ref 4–15)
MV MEAN GRADIENT: 1 MMHG
MV PEAK A VEL: 1.03 M/S
MV PEAK E VEL: 0.94 M/S
MV PEAK GRADIENT: 5 MMHG
MV STENOSIS PRESSURE HALF TIME: 56.53 MS
MV VALVE AREA BY CONTINUITY EQUATION: 2.97 CM2
MV VALVE AREA P 1/2 METHOD: 3.89 CM2
NEUTROPHILS # BLD AUTO: 10.7 K/UL (ref 1.8–7.7)
NEUTROPHILS NFR BLD: 93.3 % (ref 38–73)
NRBC BLD-RTO: 0 /100 WBC
PHOSPHATE SERPL-MCNC: 2.8 MG/DL (ref 2.7–4.5)
PLATELET # BLD AUTO: 182 K/UL (ref 150–450)
PMV BLD AUTO: 11.9 FL (ref 9.2–12.9)
POCT GLUCOSE: 122 MG/DL (ref 70–110)
POCT GLUCOSE: 145 MG/DL (ref 70–110)
POCT GLUCOSE: 155 MG/DL (ref 70–110)
POCT GLUCOSE: 187 MG/DL (ref 70–110)
POTASSIUM SERPL-SCNC: 4.3 MMOL/L (ref 3.5–5.1)
PROT SERPL-MCNC: 6.1 G/DL (ref 6–8.4)
RA MAJOR: 4.52 CM
RA PRESSURE: 3 MMHG
RA WIDTH: 3.45 CM
RBC # BLD AUTO: 3.78 M/UL (ref 4–5.4)
RIGHT VENTRICULAR END-DIASTOLIC DIMENSION: 2.85 CM
RV TISSUE DOPPLER FREE WALL SYSTOLIC VELOCITY 1 (APICAL 4 CHAMBER VIEW): 20.6 CM/S
SARS-COV-2 RDRP RESP QL NAA+PROBE: NEGATIVE
SINUS: 2.57 CM
SODIUM SERPL-SCNC: 140 MMOL/L (ref 136–145)
STJ: 2.11 CM
TDI LATERAL: 0.05 M/S
TDI SEPTAL: 0.05 M/S
TDI: 0.05 M/S
TRICUSPID ANNULAR PLANE SYSTOLIC EXCURSION: 3.7 CM
WBC # BLD AUTO: 11.42 K/UL (ref 3.9–12.7)

## 2022-02-13 PROCEDURE — 85025 COMPLETE CBC W/AUTO DIFF WBC: CPT | Performed by: STUDENT IN AN ORGANIZED HEALTH CARE EDUCATION/TRAINING PROGRAM

## 2022-02-13 PROCEDURE — 99233 SBSQ HOSP IP/OBS HIGH 50: CPT | Mod: GC,,, | Performed by: INTERNAL MEDICINE

## 2022-02-13 PROCEDURE — 25000003 PHARM REV CODE 250: Performed by: STUDENT IN AN ORGANIZED HEALTH CARE EDUCATION/TRAINING PROGRAM

## 2022-02-13 PROCEDURE — 94640 AIRWAY INHALATION TREATMENT: CPT

## 2022-02-13 PROCEDURE — 63600175 PHARM REV CODE 636 W HCPCS: Performed by: INTERNAL MEDICINE

## 2022-02-13 PROCEDURE — U0002 COVID-19 LAB TEST NON-CDC: HCPCS | Performed by: STUDENT IN AN ORGANIZED HEALTH CARE EDUCATION/TRAINING PROGRAM

## 2022-02-13 PROCEDURE — 99233 PR SUBSEQUENT HOSPITAL CARE,LEVL III: ICD-10-PCS | Mod: GC,,, | Performed by: INTERNAL MEDICINE

## 2022-02-13 PROCEDURE — 25000003 PHARM REV CODE 250: Performed by: INTERNAL MEDICINE

## 2022-02-13 PROCEDURE — 85730 THROMBOPLASTIN TIME PARTIAL: CPT | Mod: 91 | Performed by: INTERNAL MEDICINE

## 2022-02-13 PROCEDURE — 84100 ASSAY OF PHOSPHORUS: CPT | Performed by: STUDENT IN AN ORGANIZED HEALTH CARE EDUCATION/TRAINING PROGRAM

## 2022-02-13 PROCEDURE — 27000221 HC OXYGEN, UP TO 24 HOURS

## 2022-02-13 PROCEDURE — 83735 ASSAY OF MAGNESIUM: CPT | Performed by: STUDENT IN AN ORGANIZED HEALTH CARE EDUCATION/TRAINING PROGRAM

## 2022-02-13 PROCEDURE — 20000000 HC ICU ROOM

## 2022-02-13 PROCEDURE — 63600175 PHARM REV CODE 636 W HCPCS: Performed by: STUDENT IN AN ORGANIZED HEALTH CARE EDUCATION/TRAINING PROGRAM

## 2022-02-13 PROCEDURE — 94761 N-INVAS EAR/PLS OXIMETRY MLT: CPT

## 2022-02-13 PROCEDURE — 80053 COMPREHEN METABOLIC PANEL: CPT | Performed by: STUDENT IN AN ORGANIZED HEALTH CARE EDUCATION/TRAINING PROGRAM

## 2022-02-13 PROCEDURE — 99900035 HC TECH TIME PER 15 MIN (STAT)

## 2022-02-13 PROCEDURE — 85730 THROMBOPLASTIN TIME PARTIAL: CPT | Performed by: STUDENT IN AN ORGANIZED HEALTH CARE EDUCATION/TRAINING PROGRAM

## 2022-02-13 PROCEDURE — 25000242 PHARM REV CODE 250 ALT 637 W/ HCPCS: Performed by: STUDENT IN AN ORGANIZED HEALTH CARE EDUCATION/TRAINING PROGRAM

## 2022-02-13 RX ORDER — AMOXICILLIN 250 MG
1 CAPSULE ORAL DAILY
Status: DISCONTINUED | OUTPATIENT
Start: 2022-02-13 | End: 2022-02-13

## 2022-02-13 RX ORDER — AMOXICILLIN 250 MG
1 CAPSULE ORAL DAILY PRN
Status: DISCONTINUED | OUTPATIENT
Start: 2022-02-13 | End: 2022-02-21 | Stop reason: HOSPADM

## 2022-02-13 RX ORDER — POLYETHYLENE GLYCOL 3350 17 G/17G
17 POWDER, FOR SOLUTION ORAL 2 TIMES DAILY
Status: DISCONTINUED | OUTPATIENT
Start: 2022-02-13 | End: 2022-02-13

## 2022-02-13 RX ORDER — SODIUM CHLORIDE 9 MG/ML
INJECTION, SOLUTION INTRAVENOUS CONTINUOUS
Status: DISCONTINUED | OUTPATIENT
Start: 2022-02-13 | End: 2022-02-16

## 2022-02-13 RX ADMIN — ATORVASTATIN CALCIUM 80 MG: 20 TABLET, FILM COATED ORAL at 08:02

## 2022-02-13 RX ADMIN — TICAGRELOR 90 MG: 90 TABLET ORAL at 08:02

## 2022-02-13 RX ADMIN — PREDNISONE 50 MG: 20 TABLET ORAL at 09:02

## 2022-02-13 RX ADMIN — NITROGLYCERIN 40 MCG/MIN: 20 INJECTION INTRAVENOUS at 02:02

## 2022-02-13 RX ADMIN — PREDNISONE 50 MG: 20 TABLET ORAL at 06:02

## 2022-02-13 RX ADMIN — METOPROLOL SUCCINATE 50 MG: 50 TABLET, EXTENDED RELEASE ORAL at 08:02

## 2022-02-13 RX ADMIN — IPRATROPIUM BROMIDE AND ALBUTEROL SULFATE 3 ML: 2.5; .5 SOLUTION RESPIRATORY (INHALATION) at 08:02

## 2022-02-13 RX ADMIN — PANTOPRAZOLE SODIUM 40 MG: 40 TABLET, DELAYED RELEASE ORAL at 08:02

## 2022-02-13 RX ADMIN — SODIUM CHLORIDE: 0.9 INJECTION, SOLUTION INTRAVENOUS at 10:02

## 2022-02-13 RX ADMIN — DIPHENHYDRAMINE HYDROCHLORIDE 50 MG: 50 CAPSULE ORAL at 02:02

## 2022-02-13 RX ADMIN — TIOTROPIUM BROMIDE INHALATION SPRAY 2 PUFF: 3.12 SPRAY, METERED RESPIRATORY (INHALATION) at 08:02

## 2022-02-13 RX ADMIN — FLUTICASONE FUROATE AND VILANTEROL TRIFENATATE 1 PUFF: 100; 25 POWDER RESPIRATORY (INHALATION) at 08:02

## 2022-02-13 RX ADMIN — HEPARIN SODIUM 17 UNITS/KG/HR: 5000 INJECTION INTRAVENOUS; SUBCUTANEOUS at 04:02

## 2022-02-13 RX ADMIN — HEPARIN SODIUM 60 UNITS/KG/HR: 5000 INJECTION INTRAVENOUS; SUBCUTANEOUS at 10:02

## 2022-02-13 RX ADMIN — DIPHENHYDRAMINE HYDROCHLORIDE 50 MG: 50 CAPSULE ORAL at 09:02

## 2022-02-13 RX ADMIN — AMLODIPINE BESYLATE 10 MG: 10 TABLET ORAL at 08:02

## 2022-02-13 RX ADMIN — DIPHENHYDRAMINE HYDROCHLORIDE 50 MG: 50 CAPSULE ORAL at 06:02

## 2022-02-13 RX ADMIN — FAMOTIDINE 20 MG: 20 TABLET, FILM COATED ORAL at 08:02

## 2022-02-13 RX ADMIN — ASPIRIN 81 MG CHEWABLE TABLET 81 MG: 81 TABLET CHEWABLE at 08:02

## 2022-02-13 NOTE — PLAN OF CARE
POC reviewed with Marisol Kerr and family at 0300. Pt verbalized understanding. Questions and concerns addressed. 3L NC. [place] in place. 2 BM. Heparin PTT came back subtherapeutic at 34.2 (therapeutic goal is 39-69). Per Heparin nomogram, patient was bolused appropriately and Heparin gtt was increased. Redraw ordered for 0830. Current drips are Heparin and Nitroglycerin. No complaints of chest pain overnight. Plan for PCI tomorrow (2/14). No acute events overnight. Pt progressing toward goals. Will continue to monitor. See below and flowsheets for full assessment and VS info.       Neuro:  [place] Coma Scale  Best Eye Response: 4-->(E4) spontaneous  Best Motor Response: 6-->(M6) obeys commands  Best Verbal Response: 5-->(V5) oriented  [place] Coma Scale Score: 15  Assessment Qualifiers: no eye obstruction present,patient not sedated/intubated  Pupil PERRLA: yes     24hr Temp:  [98 °F (36.7 °C)-98.5 °F (36.9 °C)]     CV:   Rhythm: normal sinus rhythm  BP goals:   SBP < 180  MAP > 65    Resp:   O2 Device (Oxygen Therapy): nasal cannula       Plan: N/A    GI/:     Diet/Nutrition Received: 2 gram sodium,low saturated fat/low cholesterol  Last Bowel Movement: 02/13/22  Voiding Characteristics: voids spontaneously without difficulty    Intake/Output Summary (Last 24 hours) at 2/13/2022 0554  Last data filed at 2/13/2022 0501  Gross per 24 hour   Intake 971.51 ml   Output 1250 ml   Net -278.49 ml     Unmeasured Output  Urine Occurrence: 1  Stool Occurrence: 1  Emesis Occurrence: 0  Pad Count: 2    Labs/Accuchecks:  Recent Labs   Lab 02/13/22 0148   WBC 11.42   RBC 3.78*   HGB 10.6*   HCT 35.3*         Recent Labs   Lab 02/13/22 0148      K 4.3   CO2 24      BUN 47*   CREATININE 1.7*   ALKPHOS 78   ALT 16   AST 20   BILITOT 0.4      Recent Labs   Lab 02/11/22  1029 02/11/22  1755 02/13/22 0148   INR 1.1  --   --    APTT 126.5*   < > 34.2*    < > = values in this interval not displayed.      Recent  Labs   Lab 02/11/22  1518   TROPONINI 3.829*       Electrolytes: N/A - electrolytes WDL  Accuchecks: ACHS    Gtts:   heparin (porcine) in D5W 17 Units/kg/hr (02/13/22 0501)    nitroGLYCERIN 40 mcg/min (02/13/22 0501)       LDA/Wounds:  Lines/Drains/Airways       Drain              Female External Urinary Catheter 02/12/22 0700 <1 day              Peripheral Intravenous Line                   Peripheral IV - Single Lumen 02/10/22 1744 22 G Posterior;Right Hand 2 days         Peripheral IV - Single Lumen 02/11/22 1500 20 G Anterior;Right Upper Arm 1 day                  Wounds: No  Wound care consulted: No

## 2022-02-13 NOTE — PLAN OF CARE
CICU DAILY GOALS       A: Awake    RASS: Goal - RASS Goal: 0-->alert and calm  Actual - RASS (Person Agitation-Sedation Scale): 0-->alert and calm   Restraint necessity:    B: Breath   SBT: Not intubated   C: Coordinate A & B, analgesics/sedatives   Pain: managed    SAT: Not intubated  D: Delirium   CAM-ICU: Overall CAM-ICU: Negative  E: Early(intubated/ Progressive (non-intubated) Mobility   MOVE Screen: Pass   Activity: Activity Management: Up to bedside commode - L3,Rolling - L1  FAS: Feeding/Nutrition   Diet order: Diet/Nutrition Received: other (see comments) (cardiac diet), Specialty Diet/Nutrition Received: other (see comments) (cardiac diet) Fluid restriction:    T: Thrombus   DVT prophylaxis: VTE Required Core Measure: Pharmacological prophylaxis initiated/maintained  H: HOB Elevation   Head of Bed (HOB) Positioning: HOB elevated  U: Ulcer Prophylaxis   GI: yes  G: Glucose control   managed    S: Skin   Bundle compliance: yes   Bathing/Skin Care: incontinence care,linen changed Date: 2/12/2022  B: Bowel Function   no issues   I: Indwelling Catheters   Montes necessity:     CVC necessity: No   IPAD offered: Not appropriate  D: De-escalation Antibx   No  Plan for the day   Patient on heparin and nitro gtts. No chest pain throughout shift. Plan for PCI on Monday. Will continue to monitor.  Family/Goals of care/Code Status   Code Status: Full Code     No acute events throughout day, VS and assessment per flow sheet, patient progressing towards goals as tolerated, plan of care reviewed with Marisol Kerr and family, all concerns addressed, will continue to monitor.

## 2022-02-13 NOTE — ASSESSMENT & PLAN NOTE
CXR nonconcerning for pleural effusion/pulmonary edema, but concerns for CHF, given patient had recent increasing SOB, BNP elevation(580), and NSTEMI. TTE notable for Grade II left ventricular diastolic dysfunction.    Transthoracic echo (TTE) complete[02/12/22]  Summary  · The left ventricle is normal in size with normal systolic function.  · The estimated ejection fraction is 65%.  · There is a minor septal wall motion abnormality.  · Severe left atrial enlargement.  · Grade II left ventricular diastolic dysfunction.  · Normal right ventricular size with normal right ventricular systolic function.  · Normal central venous pressure (3 mmHg).      Plan:  - Cardiac diet with Fluid restriction at 1.5L with strict I/Os and daily STANDING weights  - Maintain on telemetry  - Check Electrolytes, keep Mag >2 & K+ >4  - Ambulate as tolerated . PT/OT consulted

## 2022-02-13 NOTE — ASSESSMENT & PLAN NOTE
Baseline creatinine 1.5- 1.8   Creatinine uptrended from admission, but stable for now.   Recent Labs     02/12/22  0536 02/12/22  1211 02/13/22  0148   CREATININE 1.7* 1.7* 1.7*   BUN 39* 38* 47*     Estimated Creatinine Clearance: 30.8 mL/min (A) (based on SCr of 1.7 mg/dL (H)). according to latest data.     PLAN  Monitor UOP and serial BMP and adjust therapy as needed.   Avoid nephrotoxic meds  Renally dose meds.

## 2022-02-13 NOTE — ASSESSMENT & PLAN NOTE
BP was hypertensive at OSH. Stable on admission after initiation of home anti-hypertensives and  nitroglycerin drip .    Home medications:  amLODIPine (NORVASC) 5 MG, benazepriL (LOTENSIN) 40 MG ,     PLAN  metoprolol succinate (TOPROL-XL) 24 hr tablet 50 mg    amLODIPine tablet 10 mg

## 2022-02-13 NOTE — ASSESSMENT & PLAN NOTE
Last A1c:   Lab Results   Component Value Date    HGBA1C 5.2 01/27/2022      Blood Sugars (AccuCheck):    Recent Labs     02/12/22  1209 02/12/22  1727 02/12/22 2037 02/13/22  0748   POCTGLUCOSE 158* 158* 183* 155*         PLAN  -Low dose SSI  - Diabetic diet  - POCT glucose ACHS  - Will monitor glucose results and adjust insulin regimen accordingly

## 2022-02-13 NOTE — SUBJECTIVE & OBJECTIVE
Interval History: Patient seen and examined. No acute events overnight, afebrile, vital signs stable. She remains on NTG drip. Denies any episodes of chest pain.    Review of Systems   Constitutional: Negative for decreased appetite, fever, weight gain and weight loss.   HENT: Negative for congestion and sore throat.    Eyes: Negative for blurred vision, vision loss in left eye, vision loss in right eye and visual disturbance.   Cardiovascular: Negative for chest pain, leg swelling (at baseline) and palpitations.   Respiratory: Positive for shortness of breath (stable on nasal cannula). Negative for cough.    Skin: Negative for flushing and rash.   Gastrointestinal: Negative for abdominal pain, constipation, diarrhea, heartburn, nausea and vomiting.   Genitourinary: Negative for dysuria and flank pain.   Neurological: Negative for focal weakness, headaches, light-headedness and weakness.   Psychiatric/Behavioral: Negative for altered mental status and substance abuse. The patient is not nervous/anxious.    Allergic/Immunologic: Negative for environmental allergies and hives.     Objective:     Vital Signs (Most Recent):  Temp: 98.1 °F (36.7 °C) (02/13/22 1105)  Pulse: 67 (02/13/22 1205)  Resp: 18 (02/13/22 1205)  BP: (!) 113/55 (02/13/22 1205)  SpO2: 98 % (02/13/22 1205) Vital Signs (24h Range):  Temp:  [97.8 °F (36.6 °C)-98.5 °F (36.9 °C)] 98.1 °F (36.7 °C)  Pulse:  [64-89] 67  Resp:  [16-37] 18  SpO2:  [94 %-100 %] 98 %  BP: (113-160)/() 113/55     Weight: 89.4 kg (197 lb)  Body mass index is 34.9 kg/m².     SpO2: 98 %  O2 Device (Oxygen Therapy): nasal cannula      Intake/Output Summary (Last 24 hours) at 2/13/2022 1230  Last data filed at 2/13/2022 1205  Gross per 24 hour   Intake 880.3 ml   Output 850 ml   Net 30.3 ml       Lines/Drains/Airways     Drain            Female External Urinary Catheter 02/12/22 0700 1 day          Peripheral Intravenous Line                 Peripheral IV - Single Lumen  02/10/22 1744 22 G Posterior;Right Hand 2 days         Peripheral IV - Single Lumen 02/11/22 1500 20 G Anterior;Right Upper Arm 1 day                Physical Exam  Vitals and nursing note reviewed.   Constitutional:       General: She is not in acute distress.     Appearance: Normal appearance. She is obese. She is not ill-appearing, toxic-appearing or diaphoretic.   HENT:      Head: Normocephalic and atraumatic.   Eyes:      General: No scleral icterus.        Right eye: No discharge.         Left eye: No discharge.      Extraocular Movements: Extraocular movements intact.      Conjunctiva/sclera: Conjunctivae normal.   Cardiovascular:      Rate and Rhythm: Normal rate and regular rhythm.      Pulses: Normal pulses.      Heart sounds: Normal heart sounds.   Pulmonary:      Effort: Pulmonary effort is normal. No respiratory distress.      Breath sounds: No wheezing or rales.      Comments: Decreased breath sounds  Breathing on 2L via NC  Chest:      Chest wall: No tenderness.   Abdominal:      General: Abdomen is flat. Bowel sounds are normal. There is no distension.      Palpations: Abdomen is soft.      Tenderness: There is no abdominal tenderness.   Musculoskeletal:         General: No swelling or tenderness. Normal range of motion.      Cervical back: Normal range of motion and neck supple.      Right lower leg: Edema present.      Left lower leg: Edema present.   Skin:     General: Skin is warm and dry.      Capillary Refill: Capillary refill takes less than 2 seconds.      Findings: Erythema (lower extermity venous stasis) present.   Neurological:      General: No focal deficit present.      Mental Status: She is alert. Mental status is at baseline.   Psychiatric:         Mood and Affect: Mood normal.         Behavior: Behavior normal.         Significant Labs:   CMP   Recent Labs   Lab 02/12/22  0536 02/12/22  1211 02/13/22  0148    141 140   K 5.7* 4.6 4.3    102 103   CO2 24 28 24   *  154* 171*   BUN 39* 38* 47*   CREATININE 1.7* 1.7* 1.7*   CALCIUM 8.9 9.5 9.4   PROT 6.0  --  6.1   ALBUMIN 3.1*  --  3.2*   BILITOT 0.4  --  0.4   ALKPHOS 61  --  78   AST 25  --  20   ALT 13  --  16   ANIONGAP 9 11 13   ESTGFRAFRICA 33.8* 33.8* 33.8*   EGFRNONAA 29.3* 29.3* 29.3*    and CBC   Recent Labs   Lab 02/12/22  0536 02/12/22  0536 02/13/22  0148   WBC 9.64  --  11.42   HGB 10.9*  --  10.6*   HCT 36.4*   < > 35.3*   *  --  182    < > = values in this interval not displayed.       Significant Imaging: Reviewed

## 2022-02-13 NOTE — PLAN OF CARE
Recommendations    1. Modify diet to Cardiac/Diabetic 1500 kcal, fluids per MD     2. If PO intake <50%, add Boost Glucose Control BID     3. Encourage adherence to diet recs    Goals: Pt will meet and tolerate >75% EEN/EPN by RD f/u  Nutrition Goal Status: new    Communication of RD Recs:  (POC)    Renetta Cisse MS, RD, LDN  Ext: [phone]

## 2022-02-13 NOTE — CONSULTS
"[facility] - Card[facility]  Adult Nutrition  Consult Note    SUMMARY     Recommendations    1. Modify diet to Cardiac/Diabetic 1500 kcal, fluids per MD     2. If PO intake <50%, add Boost Glucose Control BID     3. Encourage adherence to diet recs    Goals: Pt will meet and tolerate >75% EEN/EPN by RD f/u  Nutrition Goal Status: new    Communication of RD Recs:  (POC)    Assessment and Plan    No nutrition diagnosis at this time     Reason for Assessment    Reason For Assessment: consult (Feeding goals)  Diagnosis:  (NSTEMI)  Relevant Medical History: COPD, DM, HTN, CAD, CA, NSTEMI  Interdisciplinary Rounds: did not attend    General Information Comments: Pt presents s/p NSTEMI. Reporst fair appetite now and PTA following Low Na diet at home. Tolerating ~half of meals since admit. Denies n/v/d/c or difficulties chewing/swallowing. UBW ~185#; 12# wt gain noted x 1 month; possibly fluid related as pt with 1+ generalized edema. NFPE not warranted; pt appears obese with no s/s of malnutrition.    Nutrition Discharge Planning: Cardiac, Diabetic diet    Nutrition Risk Screen    Nutrition Risk Screen: no indicators present    Nutrition/Diet History    Patient Reported Diet/Restrictions/Preferences: diabetic diet,low salt  Spiritual, Cultural Beliefs, Faith Practices, Values that Affect Care: no  Food Allergies: NKFA  Factors Affecting Nutritional Intake: None identified at this time    Anthropometrics    Temp: 98.1 °F (36.7 °C)  Height: 5' 3" (160 cm)  Height (inches): 63 in  Weight Method: Bed Scale  Weight: 89.4 kg (197 lb)  Weight (lb): 197 lb  Ideal Body Weight (IBW), Female: 115 lb  % Ideal Body Weight, Female (lb): 171.3 %  BMI (Calculated): 34.9  BMI Grade: 30 - 34.9- obesity - grade I  Usual Body Weight (UBW), k.09 kg  % Usual Body Weight: 106.49       Lab/Procedures/Meds    Pertinent Labs Reviewed: reviewed  Pertinent Labs Comments: BUN 47, Creat 1.7, GFR 29.3, Glu 171, Alb 3.2  Pertinent " Medications Reviewed: reviewed  Pertinent Medications Comments: pantoprazole, prednisone, senna, heparin    Estimated/Assessed Needs    Weight Used For Calorie Calculations: 89.4 kg (197 lb)  Energy Calorie Requirements (kcal): 1362 kcal/day  Energy Need Method: Aurora-St Jeor (No PAL; obese)  Protein Requirements:  g/day (1.0-1.2 g/kg)  Weight Used For Protein Calculations: 89.4 kg (197 lb)  Fluid Requirements (mL): 1 mL/kcal or per MD  Estimated Fluid Requirement Method: RDA Method  RDA Method (mL): 1362  CHO Requirement: 170 g/day    Nutrition Prescription Ordered    Current Diet Order: Cardiac  Nutrition Order Comments: 1500 mL FR    Evaluation of Received Nutrient/Fluid Intake    I/O: -1.1L since admit  Energy Calories Required: not meeting needs  Protein Required: not meeting needs  Comments: LBM 2/12  Tolerance: tolerating  % Intake of Estimated Energy Needs: 25 - 50 %  % Meal Intake: 25 - 50 %    Nutrition Risk    Level of Risk/Frequency of Follow-up: low     Monitor and Evaluation    Food and Nutrient Intake: energy intake,food and beverage intake  Food and Nutrient Adminstration: diet order  Knowledge/Beliefs/Attitudes: food and nutrition knowledge/skill  Physical Activity and Function: nutrition-related ADLs and IADLs  Anthropometric Measurements: weight,weight change  Biochemical Data, Medical Tests and Procedures: gastrointestinal profile,electrolyte and renal panel,glucose/endocrine profile,inflammatory profile,lipid profile  Nutrition-Focused Physical Findings: overall appearance     Nutrition Follow-Up    RD Follow-up?: Yes

## 2022-02-13 NOTE — ASSESSMENT & PLAN NOTE
"-Recent hx of increasing episodes of angina requiring more frequent Nitroglycerin use and underwent LHC at OSH.   -At OSH, Episode of 10/10 chest pain all over that she described as "burning", and "veins being ripped apart". Stat EKG at the time showed concerns for  lateral precordial ST depressions. She was initiated on nitroglycerin drip, heparin drip. One dose of  morphine 2mg & Lopressor 5mg IV were also administered. She reported alleviation of her chest pain after administration. Stable on Nitroglycerin drip. She was transferred to our hospital for further evaluation of her ACS and potential emergent intervention. LHC at outside hospital was notable for significant coronary artery disease:  Recent Left heart cath[02/11/22] showed:  · The RPAV lesion was 100% stenosed.  · The RPDA lesion was 100% stenosed.  · The Prox Cx to Mid Cx lesion was 80% stenosed.  · The Dist Cx lesion was 70% stenosed.  · The 1st LPL lesion was 90% stenosed.  · The Prox RCA-2 lesion was 70% stenosed.  · The Prox RCA-1 lesion was 90% stenosed.  · The Mid LAD-2 lesion was 60% stenosed.  · The estimated blood loss was <50 mL.  · There was three vessel coronary artery disease.    -ANUSHKA score: 6 points, 41% risk at 14 days of: all-cause mortality, new or recurrent MI, or severe recurrent ischemia requiring urgent revascularization.  -SYLVESTER Score: 136 points, 13 % probability of death from admission to 6 months      Plan:  -NTG infusion for chest pain   -Heparin infusion  -Continuous Telemetry monitoring  - 81 mg ASA qd  - Atorvastatin 80mg qhs  - Metoprolol 50 mg XL  - Ticagrelor 90mg bid  - Interventional cardiology consulted, started patient on prednisone 50 mg, famotidine 20 mg, diphenhydramine 50 in anticipation of LHC.   -NPO at midnight for LHC on 02/14/21        "

## 2022-02-13 NOTE — PROGRESS NOTES
[facility] - [facility]  Cardiology  Progress Note    Patient Name: Marisol Kerr  MRN: [id number]  Admission Date: 2/11/2022  Hospital Length of Stay: 2 days  Code Status: Full Code   Attending Physician: Karl Ontiveros MD   Primary Care Physician: Khadar Dwyer MD  Expected Discharge Date:   Principal Problem:NSTEMI (non-ST elevated myocardial infarction)    Subjective:     Hospital Course:   Patient admitted to CCU while pending evaluation for ACS intervention, given her NSTEMI. Interventional Cardiology and Cardiothoracic Surgery consulted. Initiated guideline directed medical therapy for ACS. Nitroglycerin infusion for chest pain alleviation.  CTS evaluation did not recommend patient for CABG due to her debility as she is prohibitively high risk for CABG. Given contrast allergy, Interventional Cards started on prednisone 50 mg, famotidine 20, diphenhydramine 50 in anticipation of [facility].      Interval History: Patient seen and examined. No acute events overnight, afebrile, vital signs stable. She remains on NTG drip. Denies any episodes of chest pain.    Review of Systems   Constitutional: Negative for decreased appetite, fever, weight gain and weight loss.   HENT: Negative for congestion and sore throat.    Eyes: Negative for blurred vision, vision loss in left eye, vision loss in right eye and visual disturbance.   Cardiovascular: Negative for chest pain, leg swelling (at baseline) and palpitations.   Respiratory: Positive for shortness of breath (stable on nasal cannula). Negative for cough.    Skin: Negative for flushing and rash.   Gastrointestinal: Negative for abdominal pain, constipation, diarrhea, heartburn, nausea and vomiting.   Genitourinary: Negative for dysuria and flank pain.   Neurological: Negative for focal weakness, headaches, light-headedness and weakness.   Psychiatric/Behavioral: Negative for altered mental status and substance abuse. The patient is not nervous/anxious.     Allergic/Immunologic: Negative for environmental allergies and hives.     Objective:     Vital Signs (Most Recent):  Temp: 98.1 °F (36.7 °C) (02/13/22 1105)  Pulse: 67 (02/13/22 1205)  Resp: 18 (02/13/22 1205)  BP: (!) 113/55 (02/13/22 1205)  SpO2: 98 % (02/13/22 1205) Vital Signs (24h Range):  Temp:  [97.8 °F (36.6 °C)-98.5 °F (36.9 °C)] 98.1 °F (36.7 °C)  Pulse:  [64-89] 67  Resp:  [16-37] 18  SpO2:  [94 %-100 %] 98 %  BP: (113-160)/() 113/55     Weight: 89.4 kg (197 lb)  Body mass index is 34.9 kg/m².     SpO2: 98 %  O2 Device (Oxygen Therapy): nasal cannula      Intake/Output Summary (Last 24 hours) at 2/13/2022 1230  Last data filed at 2/13/2022 1205  Gross per 24 hour   Intake 880.3 ml   Output 850 ml   Net 30.3 ml       Lines/Drains/Airways     Drain            Female External Urinary Catheter 02/12/22 0700 1 day          Peripheral Intravenous Line                 Peripheral IV - Single Lumen 02/10/22 1744 22 G Posterior;Right Hand 2 days         Peripheral IV - Single Lumen 02/11/22 1500 20 G Anterior;Right Upper Arm 1 day                Physical Exam  Vitals and nursing note reviewed.   Constitutional:       General: She is not in acute distress.     Appearance: Normal appearance. She is obese. She is not ill-appearing, toxic-appearing or diaphoretic.   HENT:      Head: Normocephalic and atraumatic.   Eyes:      General: No scleral icterus.        Right eye: No discharge.         Left eye: No discharge.      Extraocular Movements: Extraocular movements intact.      Conjunctiva/sclera: Conjunctivae normal.   Cardiovascular:      Rate and Rhythm: Normal rate and regular rhythm.      Pulses: Normal pulses.      Heart sounds: Normal heart sounds.   Pulmonary:      Effort: Pulmonary effort is normal. No respiratory distress.      Breath sounds: No wheezing or rales.      Comments: Decreased breath sounds  Breathing on 2L via NC  Chest:      Chest wall: No tenderness.   Abdominal:      General: Abdomen  "is flat. Bowel sounds are normal. There is no distension.      Palpations: Abdomen is soft.      Tenderness: There is no abdominal tenderness.   Musculoskeletal:         General: No swelling or tenderness. Normal range of motion.      Cervical back: Normal range of motion and neck supple.      Right lower leg: Edema present.      Left lower leg: Edema present.   Skin:     General: Skin is warm and dry.      Capillary Refill: Capillary refill takes less than 2 seconds.      Findings: Erythema (lower extermity venous stasis) present.   Neurological:      General: No focal deficit present.      Mental Status: She is alert. Mental status is at baseline.   Psychiatric:         Mood and Affect: Mood normal.         Behavior: Behavior normal.         Significant Labs:   CMP   Recent Labs   Lab 02/12/22  0536 02/12/22  1211 02/13/22  0148    141 140   K 5.7* 4.6 4.3    102 103   CO2 24 28 24   * 154* 171*   BUN 39* 38* 47*   CREATININE 1.7* 1.7* 1.7*   CALCIUM 8.9 9.5 9.4   PROT 6.0  --  6.1   ALBUMIN 3.1*  --  3.2*   BILITOT 0.4  --  0.4   ALKPHOS 61  --  78   AST 25  --  20   ALT 13  --  16   ANIONGAP 9 11 13   ESTGFRAFRICA 33.8* 33.8* 33.8*   EGFRNONAA 29.3* 29.3* 29.3*    and CBC   Recent Labs   Lab 02/12/22  0536 02/12/22  0536 02/13/22  0148   WBC 9.64  --  11.42   HGB 10.9*  --  10.6*   HCT 36.4*   < > 35.3*   *  --  182    < > = values in this interval not displayed.       Significant Imaging: Reviewed       Assessment and Plan:   * NSTEMI (non-ST elevated myocardial infarction)  -Recent hx of increasing episodes of angina requiring more frequent Nitroglycerin use and underwent LHC at OSH.   -At OSH, Episode of 10/10 chest pain all over that she described as "burning", and "veins being ripped apart". Stat EKG at the time showed concerns for  lateral precordial ST depressions. She was initiated on nitroglycerin drip, heparin drip. One dose of  morphine 2mg & Lopressor 5mg IV were also " administered. She reported alleviation of her chest pain after administration. Stable on Nitroglycerin drip. She was transferred to our hospital for further evaluation of her ACS and potential emergent intervention. LHC at outside hospital was notable for significant coronary artery disease:  Recent Left heart cath[02/11/22] showed:  · The RPAV lesion was 100% stenosed.  · The RPDA lesion was 100% stenosed.  · The Prox Cx to Mid Cx lesion was 80% stenosed.  · The Dist Cx lesion was 70% stenosed.  · The 1st LPL lesion was 90% stenosed.  · The Prox RCA-2 lesion was 70% stenosed.  · The Prox RCA-1 lesion was 90% stenosed.  · The Mid LAD-2 lesion was 60% stenosed.  · The estimated blood loss was <50 mL.  · There was three vessel coronary artery disease.    -ANUSHKA score: 6 points, 41% risk at 14 days of: all-cause mortality, new or recurrent MI, or severe recurrent ischemia requiring urgent revascularization.  -SYLVESTER Score: 136 points, 13 % probability of death from admission to 6 months      Plan:  -NTG infusion for chest pain   -Heparin infusion  -Continuous Telemetry monitoring  - 81 mg ASA qd  - Atorvastatin 80mg qhs  - Metoprolol 50 mg XL  - Ticagrelor 90mg bid  - Interventional cardiology consulted, started patient on prednisone 50 mg, famotidine 20 mg, diphenhydramine 50 in anticipation of LHC.   -NPO at midnight for LHC on 02/14/21          Acute on chronic diastolic congestive heart failure  CXR nonconcerning for pleural effusion/pulmonary edema, but concerns for CHF, given patient had recent increasing SOB, BNP elevation(580), and NSTEMI. TTE notable for Grade II left ventricular diastolic dysfunction.    Transthoracic echo (TTE) complete[02/12/22]  Summary  · The left ventricle is normal in size with normal systolic function.  · The estimated ejection fraction is 65%.  · There is a minor septal wall motion abnormality.  · Severe left atrial enlargement.  · Grade II left ventricular diastolic  dysfunction.  · Normal right ventricular size with normal right ventricular systolic function.  · Normal central venous pressure (3 mmHg).      Plan:  - Cardiac diet with Fluid restriction at 1.5L with strict I/Os and daily STANDING weights  - Maintain on telemetry  - Check Electrolytes, keep Mag >2 & K+ >4  - Ambulate as tolerated . PT/OT consulted    COPD/emphysema  Known history of severe COPD (GOLD IV D PFT 2020). On home oxygen (2-3L)    Home medications: albuterol (VENTOLIN HFA) 90 mcg/actuation inhaler, ipratropium-albuteroL (COMBIVENT RESPIMAT)  mcg/actuation inhaler,  umeclidinium (INCRUSE ELLIPTA) 62.5 mcg/actuation inhalation capsule, fluticasone-salmeterol diskus inhaler 250-50 mcg      PLAN  VBG PRN  Supplementary oxygen via NC with titration to maintain  O2 sats >90%  Duonebs q6h while awake  Tiotropium inhaler        Hypercholesterolemia  Lab Results   Component Value Date    HDL 53 09/21/2021    LDLCALC 98.2 09/21/2021    CHOL 165 09/21/2021    TRIG 69 09/21/2021     PLAN  Atorvastatin 40mg    CKD (chronic kidney disease), stage III  Baseline creatinine 1.5- 1.8   Creatinine uptrended from admission, but stable for now.   Recent Labs     02/12/22  0536 02/12/22  1211 02/13/22  0148   CREATININE 1.7* 1.7* 1.7*   BUN 39* 38* 47*     Estimated Creatinine Clearance: 30.8 mL/min (A) (based on SCr of 1.7 mg/dL (H)). according to latest data.     PLAN  Monitor UOP and serial BMP and adjust therapy as needed.   Avoid nephrotoxic meds  Renally dose meds.        Gastroesophageal reflux disease without esophagitis  Stable.    PLAN  famotidine tablet 20 mg    Venous stasis  Chronic history of venous stasis of bilateral lower extremities limited to breakdown of skin without varicose veins  Patient denies increase in leg swelling over her baseline.    PLAN  Nursing wound care PRN  Elevate legs    DM type 2, controlled, with complication  Last A1c:   Lab Results   Component Value Date    HGBA1C 5.2 01/27/2022  "     Blood Sugars (AccuCheck):    Recent Labs     02/12/22  1209 02/12/22  1727 02/12/22  2037 02/13/22  0748   POCTGLUCOSE 158* 158* 183* 155*         PLAN  -Low dose SSI  - Diabetic diet  - POCT glucose ACHS  - Will monitor glucose results and adjust insulin regimen accordingly      Essential hypertension, benign  BP was hypertensive at OSH. Stable on admission after initiation of home anti-hypertensives and  nitroglycerin drip .    Home medications:  amLODIPine (NORVASC) 5 MG, benazepriL (LOTENSIN) 40 MG ,     PLAN  metoprolol succinate (TOPROL-XL) 24 hr tablet 50 mg    amLODIPine tablet 10 mg    Coronary artery disease, multivessel with history of previous PCI  See "NSTEMI (non-ST elevated myocardial infarction)" A&P      Coronary atherosclerosis  See "NSTEMI (non-ST elevated myocardial infarction)" A&P       VTE Risk Mitigation (From admission, onward)         Ordered     heparin 25,000 units in dextrose 5% (100 units/ml) IV bolus from bag - ADDITIONAL PRN BOLUS - 60 units/kg (max bolus 4000 units)  As needed (PRN)        Question:  Heparin Infusion Adjustment (DO NOT MODIFY ANSWER)  Answer:  [email or web address]\Images\Pharmacy\HeparinInfusions\heparin LOW INTENSITY nomogram for [facility] [id number]    02/11/22 1617     heparin 25,000 units in dextrose 5% (100 units/ml) IV bolus from bag - ADDITIONAL PRN BOLUS - 30 units/kg (max bolus 4000 units)  As needed (PRN)        Question:  Heparin Infusion Adjustment (DO NOT MODIFY ANSWER)  Answer:  [email or web address]\Images\Pharmacy\HeparinInfusions\heparin LOW INTENSITY nomogram for [facility] [id number]    02/11/22 1617     heparin 25,000 units in dextrose 5% 250 mL (100 units/mL) infusion LOW INTENSITY nomogram - [facility]  Continuous        Question Answer Comment   Heparin Infusion Adjustment (DO NOT MODIFY ANSWER) [email or web address]\Images\Pharmacy\HeparinInfusions\heparin LOW INTENSITY nomogram for [facility] [id number]    Begin at (in units/kg/hr) 12        02/11/22 1617     Reason for " No Pharmacological VTE Prophylaxis  Once        Question:  Reasons:  Answer:  Physician Provided (leave comment)    02/11/22 1446     IP VTE HIGH RISK PATIENT  Once         02/11/22 1446     Place sequential compression device  Until discontinued         02/11/22 1446                Ralph George DO  Cardiology  [facility] - [facility]

## 2022-02-14 ENCOUNTER — TELEPHONE (OUTPATIENT)
Dept: FAMILY MEDICINE | Facility: CLINIC | Age: 77
End: 2022-02-14
Payer: MEDICARE

## 2022-02-14 PROBLEM — S30.1XXA RECTUS SHEATH HEMATOMA: Status: ACTIVE | Noted: 2022-02-14

## 2022-02-14 PROBLEM — R10.84 GENERALIZED ABDOMINAL PAIN: Status: ACTIVE | Noted: 2022-02-14

## 2022-02-14 LAB
ABO + RH BLD: NORMAL
ALBUMIN SERPL BCP-MCNC: 3 G/DL (ref 3.5–5.2)
ALP SERPL-CCNC: 63 U/L (ref 55–135)
ALT SERPL W/O P-5'-P-CCNC: 17 U/L (ref 10–44)
ANION GAP SERPL CALC-SCNC: 9 MMOL/L (ref 8–16)
APTT BLDCRRT: 89.8 SEC (ref 21–32)
AST SERPL-CCNC: 25 U/L (ref 10–40)
BASOPHILS # BLD AUTO: 0.01 K/UL (ref 0–0.2)
BASOPHILS # BLD AUTO: 0.01 K/UL (ref 0–0.2)
BASOPHILS # BLD AUTO: 0.02 K/UL (ref 0–0.2)
BASOPHILS NFR BLD: 0.1 % (ref 0–1.9)
BILIRUB SERPL-MCNC: 0.4 MG/DL (ref 0.1–1)
BLD GP AB SCN CELLS X3 SERPL QL: NORMAL
BUN SERPL-MCNC: 52 MG/DL (ref 8–23)
CALCIUM SERPL-MCNC: 8.8 MG/DL (ref 8.7–10.5)
CHLORIDE SERPL-SCNC: 104 MMOL/L (ref 95–110)
CK MB SERPL-MCNC: 2 NG/ML (ref 0.1–6.5)
CK MB SERPL-RTO: 2 % (ref 0–5)
CK SERPL-CCNC: 100 U/L (ref 20–180)
CO2 SERPL-SCNC: 26 MMOL/L (ref 23–29)
CREAT SERPL-MCNC: 1.7 MG/DL (ref 0.5–1.4)
DIFFERENTIAL METHOD: ABNORMAL
EOSINOPHIL # BLD AUTO: 0 K/UL (ref 0–0.5)
EOSINOPHIL NFR BLD: 0 % (ref 0–8)
ERYTHROCYTE [DISTWIDTH] IN BLOOD BY AUTOMATED COUNT: 13 % (ref 11.5–14.5)
ERYTHROCYTE [DISTWIDTH] IN BLOOD BY AUTOMATED COUNT: 13.1 % (ref 11.5–14.5)
ERYTHROCYTE [DISTWIDTH] IN BLOOD BY AUTOMATED COUNT: 13.6 % (ref 11.5–14.5)
EST. GFR  (AFRICAN AMERICAN): 33.8 ML/MIN/1.73 M^2
EST. GFR  (NON AFRICAN AMERICAN): 29.3 ML/MIN/1.73 M^2
GLUCOSE SERPL-MCNC: 162 MG/DL (ref 70–110)
HCT VFR BLD AUTO: 26.1 % (ref 37–48.5)
HCT VFR BLD AUTO: 29.4 % (ref 37–48.5)
HCT VFR BLD AUTO: 32.5 % (ref 37–48.5)
HGB BLD-MCNC: 8 G/DL (ref 12–16)
HGB BLD-MCNC: 8.9 G/DL (ref 12–16)
HGB BLD-MCNC: 9.9 G/DL (ref 12–16)
IMM GRANULOCYTES # BLD AUTO: 0.11 K/UL (ref 0–0.04)
IMM GRANULOCYTES # BLD AUTO: 0.2 K/UL (ref 0–0.04)
IMM GRANULOCYTES # BLD AUTO: 0.22 K/UL (ref 0–0.04)
IMM GRANULOCYTES NFR BLD AUTO: 0.8 % (ref 0–0.5)
IMM GRANULOCYTES NFR BLD AUTO: 1.2 % (ref 0–0.5)
IMM GRANULOCYTES NFR BLD AUTO: 1.4 % (ref 0–0.5)
LACTATE SERPL-SCNC: 1.7 MMOL/L (ref 0.5–2.2)
LYMPHOCYTES # BLD AUTO: 0.5 K/UL (ref 1–4.8)
LYMPHOCYTES NFR BLD: 3 % (ref 18–48)
LYMPHOCYTES NFR BLD: 3.3 % (ref 18–48)
LYMPHOCYTES NFR BLD: 3.5 % (ref 18–48)
MAGNESIUM SERPL-MCNC: 2 MG/DL (ref 1.6–2.6)
MCH RBC QN AUTO: 28.4 PG (ref 27–31)
MCH RBC QN AUTO: 28.8 PG (ref 27–31)
MCH RBC QN AUTO: 28.9 PG (ref 27–31)
MCHC RBC AUTO-ENTMCNC: 30.3 G/DL (ref 32–36)
MCHC RBC AUTO-ENTMCNC: 30.5 G/DL (ref 32–36)
MCHC RBC AUTO-ENTMCNC: 30.7 G/DL (ref 32–36)
MCV RBC AUTO: 94 FL (ref 82–98)
MCV RBC AUTO: 94 FL (ref 82–98)
MCV RBC AUTO: 95 FL (ref 82–98)
MONOCYTES # BLD AUTO: 0.5 K/UL (ref 0.3–1)
MONOCYTES # BLD AUTO: 0.6 K/UL (ref 0.3–1)
MONOCYTES # BLD AUTO: 0.9 K/UL (ref 0.3–1)
MONOCYTES NFR BLD: 3.2 % (ref 4–15)
MONOCYTES NFR BLD: 4.2 % (ref 4–15)
MONOCYTES NFR BLD: 5.4 % (ref 4–15)
NEUTROPHILS # BLD AUTO: 13.2 K/UL (ref 1.8–7.7)
NEUTROPHILS # BLD AUTO: 13.9 K/UL (ref 1.8–7.7)
NEUTROPHILS # BLD AUTO: 15 K/UL (ref 1.8–7.7)
NEUTROPHILS NFR BLD: 90.3 % (ref 38–73)
NEUTROPHILS NFR BLD: 91 % (ref 38–73)
NEUTROPHILS NFR BLD: 92.4 % (ref 38–73)
NRBC BLD-RTO: 0 /100 WBC
PHOSPHATE SERPL-MCNC: 2.9 MG/DL (ref 2.7–4.5)
PLATELET # BLD AUTO: 172 K/UL (ref 150–450)
PLATELET # BLD AUTO: 174 K/UL (ref 150–450)
PLATELET # BLD AUTO: 202 K/UL (ref 150–450)
PMV BLD AUTO: 12.4 FL (ref 9.2–12.9)
PMV BLD AUTO: 12.7 FL (ref 9.2–12.9)
PMV BLD AUTO: 12.9 FL (ref 9.2–12.9)
POCT GLUCOSE: 123 MG/DL (ref 70–110)
POCT GLUCOSE: 126 MG/DL (ref 70–110)
POCT GLUCOSE: 146 MG/DL (ref 70–110)
POCT GLUCOSE: 193 MG/DL (ref 70–110)
POTASSIUM SERPL-SCNC: 4.8 MMOL/L (ref 3.5–5.1)
PROT SERPL-MCNC: 5.9 G/DL (ref 6–8.4)
RBC # BLD AUTO: 2.77 M/UL (ref 4–5.4)
RBC # BLD AUTO: 3.13 M/UL (ref 4–5.4)
RBC # BLD AUTO: 3.44 M/UL (ref 4–5.4)
SODIUM SERPL-SCNC: 139 MMOL/L (ref 136–145)
WBC # BLD AUTO: 14.25 K/UL (ref 3.9–12.7)
WBC # BLD AUTO: 15.31 K/UL (ref 3.9–12.7)
WBC # BLD AUTO: 16.62 K/UL (ref 3.9–12.7)

## 2022-02-14 PROCEDURE — 25500020 PHARM REV CODE 255: Performed by: INTERNAL MEDICINE

## 2022-02-14 PROCEDURE — 25000003 PHARM REV CODE 250: Performed by: STUDENT IN AN ORGANIZED HEALTH CARE EDUCATION/TRAINING PROGRAM

## 2022-02-14 PROCEDURE — 27000221 HC OXYGEN, UP TO 24 HOURS

## 2022-02-14 PROCEDURE — 25000242 PHARM REV CODE 250 ALT 637 W/ HCPCS: Performed by: STUDENT IN AN ORGANIZED HEALTH CARE EDUCATION/TRAINING PROGRAM

## 2022-02-14 PROCEDURE — 25000003 PHARM REV CODE 250: Performed by: INTERNAL MEDICINE

## 2022-02-14 PROCEDURE — 80053 COMPREHEN METABOLIC PANEL: CPT | Performed by: STUDENT IN AN ORGANIZED HEALTH CARE EDUCATION/TRAINING PROGRAM

## 2022-02-14 PROCEDURE — 63600175 PHARM REV CODE 636 W HCPCS: Performed by: RADIOLOGY

## 2022-02-14 PROCEDURE — 99233 SBSQ HOSP IP/OBS HIGH 50: CPT | Mod: GC,,, | Performed by: INTERNAL MEDICINE

## 2022-02-14 PROCEDURE — 85025 COMPLETE CBC W/AUTO DIFF WBC: CPT | Mod: 91 | Performed by: STUDENT IN AN ORGANIZED HEALTH CARE EDUCATION/TRAINING PROGRAM

## 2022-02-14 PROCEDURE — 63600175 PHARM REV CODE 636 W HCPCS: Performed by: STUDENT IN AN ORGANIZED HEALTH CARE EDUCATION/TRAINING PROGRAM

## 2022-02-14 PROCEDURE — 82553 CREATINE MB FRACTION: CPT

## 2022-02-14 PROCEDURE — 25000003 PHARM REV CODE 250

## 2022-02-14 PROCEDURE — 83735 ASSAY OF MAGNESIUM: CPT | Performed by: STUDENT IN AN ORGANIZED HEALTH CARE EDUCATION/TRAINING PROGRAM

## 2022-02-14 PROCEDURE — 94640 AIRWAY INHALATION TREATMENT: CPT

## 2022-02-14 PROCEDURE — 63600175 PHARM REV CODE 636 W HCPCS: Performed by: INTERNAL MEDICINE

## 2022-02-14 PROCEDURE — 84100 ASSAY OF PHOSPHORUS: CPT | Performed by: STUDENT IN AN ORGANIZED HEALTH CARE EDUCATION/TRAINING PROGRAM

## 2022-02-14 PROCEDURE — 86900 BLOOD TYPING SEROLOGIC ABO: CPT | Performed by: INTERNAL MEDICINE

## 2022-02-14 PROCEDURE — 85025 COMPLETE CBC W/AUTO DIFF WBC: CPT | Mod: 91 | Performed by: INTERNAL MEDICINE

## 2022-02-14 PROCEDURE — 99900035 HC TECH TIME PER 15 MIN (STAT)

## 2022-02-14 PROCEDURE — 85025 COMPLETE CBC W/AUTO DIFF WBC: CPT

## 2022-02-14 PROCEDURE — 83605 ASSAY OF LACTIC ACID: CPT

## 2022-02-14 PROCEDURE — 20000000 HC ICU ROOM

## 2022-02-14 PROCEDURE — 86920 COMPATIBILITY TEST SPIN: CPT | Performed by: STUDENT IN AN ORGANIZED HEALTH CARE EDUCATION/TRAINING PROGRAM

## 2022-02-14 PROCEDURE — 85730 THROMBOPLASTIN TIME PARTIAL: CPT | Performed by: INTERNAL MEDICINE

## 2022-02-14 PROCEDURE — 86850 RBC ANTIBODY SCREEN: CPT | Performed by: INTERNAL MEDICINE

## 2022-02-14 PROCEDURE — 94761 N-INVAS EAR/PLS OXIMETRY MLT: CPT

## 2022-02-14 PROCEDURE — 99233 PR SUBSEQUENT HOSPITAL CARE,LEVL III: ICD-10-PCS | Mod: GC,,, | Performed by: INTERNAL MEDICINE

## 2022-02-14 PROCEDURE — 36415 COLL VENOUS BLD VENIPUNCTURE: CPT | Performed by: INTERNAL MEDICINE

## 2022-02-14 RX ORDER — SODIUM CHLORIDE 9 MG/ML
INJECTION, SOLUTION INTRAVENOUS CONTINUOUS
Status: ACTIVE | OUTPATIENT
Start: 2022-02-14 | End: 2022-02-15

## 2022-02-14 RX ORDER — SODIUM CHLORIDE 9 MG/ML
INJECTION, SOLUTION INTRAVENOUS ONCE
Status: CANCELLED | OUTPATIENT
Start: 2022-02-14 | End: 2022-02-14

## 2022-02-14 RX ORDER — POLYETHYLENE GLYCOL 3350 17 G/17G
17 POWDER, FOR SOLUTION ORAL DAILY
Status: DISCONTINUED | OUTPATIENT
Start: 2022-02-14 | End: 2022-02-21

## 2022-02-14 RX ORDER — SODIUM CHLORIDE 0.9 % (FLUSH) 0.9 %
10 SYRINGE (ML) INJECTION
Status: DISCONTINUED | OUTPATIENT
Start: 2022-02-14 | End: 2022-02-21 | Stop reason: HOSPADM

## 2022-02-14 RX ORDER — PHENYLEPHRINE HCL IN 0.9% NACL 1 MG/10 ML
SYRINGE (ML) INTRAVENOUS
Status: COMPLETED
Start: 2022-02-14 | End: 2022-02-14

## 2022-02-14 RX ORDER — MIDAZOLAM HYDROCHLORIDE 1 MG/ML
INJECTION INTRAMUSCULAR; INTRAVENOUS CODE/TRAUMA/SEDATION MEDICATION
Status: COMPLETED | OUTPATIENT
Start: 2022-02-14 | End: 2022-02-14

## 2022-02-14 RX ORDER — FENTANYL CITRATE 50 UG/ML
INJECTION, SOLUTION INTRAMUSCULAR; INTRAVENOUS CODE/TRAUMA/SEDATION MEDICATION
Status: COMPLETED | OUTPATIENT
Start: 2022-02-14 | End: 2022-02-14

## 2022-02-14 RX ORDER — MORPHINE SULFATE 2 MG/ML
2 INJECTION, SOLUTION INTRAMUSCULAR; INTRAVENOUS ONCE
Status: COMPLETED | OUTPATIENT
Start: 2022-02-14 | End: 2022-02-14

## 2022-02-14 RX ORDER — DIPHENHYDRAMINE HYDROCHLORIDE 50 MG/ML
INJECTION INTRAMUSCULAR; INTRAVENOUS
Status: COMPLETED
Start: 2022-02-14 | End: 2022-02-14

## 2022-02-14 RX ADMIN — PREDNISONE 50 MG: 20 TABLET ORAL at 06:02

## 2022-02-14 RX ADMIN — PANTOPRAZOLE SODIUM 40 MG: 40 TABLET, DELAYED RELEASE ORAL at 08:02

## 2022-02-14 RX ADMIN — TIOTROPIUM BROMIDE INHALATION SPRAY 2 PUFF: 3.12 SPRAY, METERED RESPIRATORY (INHALATION) at 08:02

## 2022-02-14 RX ADMIN — AMLODIPINE BESYLATE 10 MG: 10 TABLET ORAL at 08:02

## 2022-02-14 RX ADMIN — HEPARIN SODIUM 22 UNITS/KG/HR: 5000 INJECTION INTRAVENOUS; SUBCUTANEOUS at 12:02

## 2022-02-14 RX ADMIN — DIPHENHYDRAMINE HYDROCHLORIDE 50 MG: 50 CAPSULE ORAL at 10:02

## 2022-02-14 RX ADMIN — METOPROLOL SUCCINATE 50 MG: 50 TABLET, EXTENDED RELEASE ORAL at 08:02

## 2022-02-14 RX ADMIN — PREDNISONE 50 MG: 20 TABLET ORAL at 04:02

## 2022-02-14 RX ADMIN — SODIUM CHLORIDE: 0.9 INJECTION, SOLUTION INTRAVENOUS at 04:02

## 2022-02-14 RX ADMIN — FLUTICASONE FUROATE AND VILANTEROL TRIFENATATE 1 PUFF: 100; 25 POWDER RESPIRATORY (INHALATION) at 08:02

## 2022-02-14 RX ADMIN — HYDROCODONE BITARTRATE AND ACETAMINOPHEN 1 TABLET: 5; 325 TABLET ORAL at 08:02

## 2022-02-14 RX ADMIN — MIDAZOLAM HYDROCHLORIDE 1 MG: 1 INJECTION, SOLUTION INTRAMUSCULAR; INTRAVENOUS at 11:02

## 2022-02-14 RX ADMIN — DOCUSATE SODIUM 50 MG AND SENNOSIDES 8.6 MG 1 TABLET: 8.6; 5 TABLET, FILM COATED ORAL at 07:02

## 2022-02-14 RX ADMIN — IPRATROPIUM BROMIDE AND ALBUTEROL SULFATE 3 ML: 2.5; .5 SOLUTION RESPIRATORY (INHALATION) at 08:02

## 2022-02-14 RX ADMIN — IPRATROPIUM BROMIDE AND ALBUTEROL SULFATE 3 ML: 2.5; .5 SOLUTION RESPIRATORY (INHALATION) at 01:02

## 2022-02-14 RX ADMIN — DIPHENHYDRAMINE HYDROCHLORIDE 50 MG: 50 CAPSULE ORAL at 04:02

## 2022-02-14 RX ADMIN — ATORVASTATIN CALCIUM 80 MG: 20 TABLET, FILM COATED ORAL at 08:02

## 2022-02-14 RX ADMIN — PREDNISONE 50 MG: 20 TABLET ORAL at 10:02

## 2022-02-14 RX ADMIN — TICAGRELOR 90 MG: 90 TABLET ORAL at 08:02

## 2022-02-14 RX ADMIN — POLYETHYLENE GLYCOL 3350 17 G: 17 POWDER, FOR SOLUTION ORAL at 08:02

## 2022-02-14 RX ADMIN — DIPHENHYDRAMINE HYDROCHLORIDE 50 MG: 50 CAPSULE ORAL at 06:02

## 2022-02-14 RX ADMIN — FENTANYL CITRATE 50 MCG: 50 INJECTION, SOLUTION INTRAMUSCULAR; INTRAVENOUS at 11:02

## 2022-02-14 RX ADMIN — ASPIRIN 81 MG CHEWABLE TABLET 81 MG: 81 TABLET CHEWABLE at 08:02

## 2022-02-14 RX ADMIN — IOHEXOL 100 ML: 350 INJECTION, SOLUTION INTRAVENOUS at 06:02

## 2022-02-14 RX ADMIN — MORPHINE SULFATE 2 MG: 2 INJECTION, SOLUTION INTRAMUSCULAR; INTRAVENOUS at 06:02

## 2022-02-14 RX ADMIN — HYDROCODONE BITARTRATE AND ACETAMINOPHEN 1 TABLET: 5; 325 TABLET ORAL at 01:02

## 2022-02-14 NOTE — NURSING
Patient complained of lower abdominal pain upon getting up from bed to sit up on her commode. Informed Dr. Hermelindo MD ordered to B. Will continue to monitor.

## 2022-02-14 NOTE — ASSESSMENT & PLAN NOTE
"-Recent hx of increasing episodes of angina requiring more frequent Nitroglycerin use and underwent LHC at OSH.   -At OSH, Episode of 10/10 chest pain all over that she described as "burning", and "veins being ripped apart". Stat EKG at the time showed concerns for  lateral precordial ST depressions. She was initiated on nitroglycerin drip, heparin drip. One dose of  morphine 2mg & Lopressor 5mg IV were also administered. She reported alleviation of her chest pain after administration. Stable on Nitroglycerin drip. She was transferred to our hospital for further evaluation of her ACS and potential emergent intervention. LHC at outside hospital was notable for significant coronary artery disease:  Recent Left heart cath[02/11/22] showed:  · The RPAV lesion was 100% stenosed.  · The RPDA lesion was 100% stenosed.  · The Prox Cx to Mid Cx lesion was 80% stenosed.  · The Dist Cx lesion was 70% stenosed.  · The 1st LPL lesion was 90% stenosed.  · The Prox RCA-2 lesion was 70% stenosed.  · The Prox RCA-1 lesion was 90% stenosed.  · The Mid LAD-2 lesion was 60% stenosed.  · The estimated blood loss was <50 mL.  · There was three vessel coronary artery disease.    -ANUSHKA score: 6 points, 41% risk at 14 days of: all-cause mortality, new or recurrent MI, or severe recurrent ischemia requiring urgent revascularization.  -SYLVESTER Score: 136 points, 13 % probability of death from admission to 6 months      Plan:  -NTG infusion for chest pain   -Heparin infusion  -Continuous Telemetry monitoring  - 81 mg ASA qd  - Atorvastatin 80mg qhs  - Metoprolol 50 mg XL  - Ticagrelor 90mg bid  - Interventional cardiology consulted, started patient on prednisone 50 mg, famotidine 20 mg, diphenhydramine 50 in anticipation of LHC.   -NPO at midnight for LHC on 02/15/21 given ongoing bleeding, might defer further.        "

## 2022-02-14 NOTE — PROGRESS NOTES
[facility]  Cardiology  Progress Note    Patient Name: Marisol Kerr  MRN: [id number]  Admission Date: 2/11/2022  Hospital Length of Stay: 3 days  Code Status: Full Code   Attending Physician: Karl Ontiveros MD   Primary Care Physician: Khadar Dwyer MD  Expected Discharge Date: 2/18/2022  Principal Problem:NSTEMI (non-ST elevated myocardial infarction)    Subjective:     Hospital Course:   Patient admitted to CCU while pending evaluation for ACS intervention, given her NSTEMI. Interventional Cardiology and Cardiothoracic Surgery consulted. Initiated guideline directed medical therapy for ACS. Nitroglycerin infusion for chest pain alleviation.  CTS evaluation did not recommend patient for CABG due to her debility as she is prohibitively high risk for CABG. Given contrast allergy, Interventional Cards started on prednisone 50 mg, famotidine 20, diphenhydramine 50 in anticipation of LHC. Patient had significant abdominal pain overnight and distended abdomen with mass. In light of this, IC deferred LHC till tomorrow.       Interval History: Patient was kept NPO after MN for LHC planned with IC today. Patient had no bowel movements.    Review of Systems   Constitutional: Negative for decreased appetite, fever, weight gain and weight loss.   HENT: Negative for congestion and sore throat.    Eyes: Negative for blurred vision, vision loss in left eye, vision loss in right eye and visual disturbance.   Cardiovascular: Negative for chest pain, leg swelling (at baseline) and palpitations.   Respiratory: Negative for cough and shortness of breath.    Skin: Negative for flushing and rash.   Gastrointestinal: Positive for abdominal pain and constipation. Negative for diarrhea, heartburn, nausea and vomiting.   Genitourinary: Negative for dysuria and flank pain.   Neurological: Negative for focal weakness, headaches, light-headedness and weakness.   Psychiatric/Behavioral: Negative for altered mental  status and substance abuse. The patient is not nervous/anxious.    Allergic/Immunologic: Negative for environmental allergies and hives.     Objective:     Vital Signs (Most Recent):  Temp: 98.7 °F (37.1 °C) (02/14/22 0705)  Pulse: 68 (02/14/22 0821)  Resp: 14 (02/14/22 0821)  BP: (!) 169/72 (02/14/22 0805)  SpO2: 100 % (02/14/22 0821) Vital Signs (24h Range):  Temp:  [97.8 °F (36.6 °C)-98.7 °F (37.1 °C)] 98.7 °F (37.1 °C)  Pulse:  [61-74] 68  Resp:  [14-34] 14  SpO2:  [90 %-100 %] 100 %  BP: (108-169)/(53-72) 169/72     Weight: 89.4 kg (197 lb)  Body mass index is 34.9 kg/m².     SpO2: 100 %  O2 Device (Oxygen Therapy): nasal cannula      Intake/Output Summary (Last 24 hours) at 2/14/2022 0854  Last data filed at 2/14/2022 0705  Gross per 24 hour   Intake 631.51 ml   Output 1700 ml   Net -1068.49 ml       Lines/Drains/Airways     Drain            Female External Urinary Catheter 02/12/22 0700 2 days          Peripheral Intravenous Line                 Peripheral IV - Single Lumen 02/10/22 1744 22 G Posterior;Right Hand 3 days         Peripheral IV - Single Lumen 02/11/22 1500 20 G Anterior;Right Upper Arm 2 days         Peripheral IV - Single Lumen 02/13/22 2159 22 G Anterior;Left Wrist <1 day                Physical Exam  Vitals and nursing note reviewed.   Constitutional:       General: She is not in acute distress.     Appearance: Normal appearance. She is obese. She is not ill-appearing, toxic-appearing or diaphoretic.   HENT:      Head: Normocephalic and atraumatic.      Right Ear: External ear normal.      Left Ear: External ear normal.   Eyes:      General: No scleral icterus.        Right eye: No discharge.         Left eye: No discharge.      Extraocular Movements: Extraocular movements intact.      Conjunctiva/sclera: Conjunctivae normal.   Cardiovascular:      Rate and Rhythm: Normal rate and regular rhythm.      Pulses: Normal pulses.      Heart sounds: Normal heart sounds.   Pulmonary:      Effort:  Pulmonary effort is normal. No respiratory distress.      Breath sounds: No wheezing or rales.      Comments: Decreased breath sounds  Breathing on 2L via NC  Chest:      Chest wall: No tenderness.   Abdominal:      General: There is distension.      Palpations: There is mass.      Tenderness: There is abdominal tenderness. There is guarding.      Comments: distended abdomen exquisitely tender to palpation on LLQ radiating to right side. Mass palpated on LLQ. R abdomen soft but slightly tender. Bowel sounds heard in 2 quadrants   Musculoskeletal:         General: No swelling or tenderness. Normal range of motion.      Cervical back: Normal range of motion and neck supple.      Right lower leg: Edema present.      Left lower leg: Edema present.   Skin:     General: Skin is warm and dry.      Capillary Refill: Capillary refill takes less than 2 seconds.      Findings: Erythema (lower extermity venous stasis) present.   Neurological:      General: No focal deficit present.      Mental Status: She is alert. Mental status is at baseline.   Psychiatric:         Mood and Affect: Mood normal.         Behavior: Behavior normal.         Significant Labs:   CMP   Recent Labs   Lab 02/13/22  0148 02/14/22  0551    139   K 4.3 4.8    104   CO2 24 26   * 162*   BUN 47* 52*   CREATININE 1.7* 1.7*   CALCIUM 9.4 8.8   PROT 6.1 5.9*   ALBUMIN 3.2* 3.0*   BILITOT 0.4 0.4   ALKPHOS 78 63   AST 20 25   ALT 16 17   ANIONGAP 13 9   ESTGFRAFRICA 33.8* 33.8*   EGFRNONAA 29.3* 29.3*    and CBC   Recent Labs   Lab 02/13/22  0148 02/13/22 0148 02/14/22  0551   WBC 11.42  --  14.25*   HGB 10.6*  --  9.9*   HCT 35.3*   < > 32.5*     --  172    < > = values in this interval not displayed.       Significant Imaging: Echocardiogram:   Transthoracic echo (TTE) complete (Cupid Only):   Results for orders placed or performed during the hospital encounter of 02/11/22   Echo   Result Value Ref Range    Ascending aorta 2.24 cm     STJ 2.11 cm    AV mean gradient 8 mmHg    Ao peak asim 1.80 m/s    Ao VTI 36.43 cm    IVRT 91.34 msec    IVS 0.86 0.6 - 1.1 cm    LA size 4.33 cm    Left Atrium Major Axis 5.64 cm    Left Atrium Minor Axis 5.64 cm    LVIDd 4.74 3.5 - 6.0 cm    LVIDs 2.64 2.1 - 4.0 cm    LVOT diameter 2.00 cm    LVOT peak VTI 27.39 cm    Posterior Wall 0.89 0.6 - 1.1 cm    MV Peak A Asim 1.03 m/s    E wave deceleration time 194.92 msec    MV Peak E Asim 0.94 m/s    RA Major Axis 4.52 cm    RA Width 3.45 cm    RVDD 2.85 cm    Sinus 2.57 cm    TAPSE 3.70 cm    TDI LATERAL 0.05 m/s    TDI SEPTAL 0.05 m/s    LA WIDTH 4.64 cm    MV stenosis pressure 1/2 time 56.53 ms    LV Diastolic Volume 104.66 mL    LV Systolic Volume 25.44 mL    RV S' 20.60 cm/s    LVOT peak asim 1.21 m/s    LA volume (mod) 78.65 cm3    MV mean gradient 1 mmHg    MV peak gradient 5 mmHg    MV VTI 28.95 cm    LV LATERAL E/E' RATIO 18.80 m/s    LV SEPTAL E/E' RATIO 18.80 m/s    FS 44 %    LA volume 96.32 cm3    LV mass 139.43 g    Left Ventricle Relative Wall Thickness 0.38 cm    AV valve area 2.36 cm2    AV Velocity Ratio 0.67     AV index (prosthetic) 0.75     MV valve area p 1/2 method 3.89 cm2    MV valve area by continuity eq 2.97 cm2    E/A ratio 0.91     Mean e' 0.05 m/s    LVOT area 3.1 cm2    LVOT stroke volume 86.00 cm3    AV peak gradient 13 mmHg    E/E' ratio 18.80 m/s    LV Systolic Volume Index 13.3 mL/m2    LV Diastolic Volume Index 54.51 mL/m2    LA Volume Index 50.2 mL/m2    LV Mass Index 73 g/m2    LA Volume Index (Mod) 41.0 mL/m2    BSA 1.99 m2    Right Atrial Pressure (from IVC) 3 mmHg    EF 65 %    Narrative    · The left ventricle is normal in size with normal systolic function.  · The estimated ejection fraction is 65%.  · There is a minor septal wall motion abnormality.  · Severe left atrial enlargement.  · Grade II left ventricular diastolic dysfunction.  · Normal right ventricular size with normal right ventricular systolic   function.  · Normal  "central venous pressure (3 mmHg).       and EKG: NSR at 62 bpm and showed some nonspecific ST changes    Assessment and Plan:       * NSTEMI (non-ST elevated myocardial infarction)  -Recent hx of increasing episodes of angina requiring more frequent Nitroglycerin use and underwent LHC at OSH.   -At OSH, Episode of 10/10 chest pain all over that she described as "burning", and "veins being ripped apart". Stat EKG at the time showed concerns for  lateral precordial ST depressions. She was initiated on nitroglycerin drip, heparin drip. One dose of  morphine 2mg & Lopressor 5mg IV were also administered. She reported alleviation of her chest pain after administration. Stable on Nitroglycerin drip. She was transferred to our hospital for further evaluation of her ACS and potential emergent intervention. LHC at outside hospital was notable for significant coronary artery disease:  Recent Left heart cath[02/11/22] showed:  · The RPAV lesion was 100% stenosed.  · The RPDA lesion was 100% stenosed.  · The Prox Cx to Mid Cx lesion was 80% stenosed.  · The Dist Cx lesion was 70% stenosed.  · The 1st LPL lesion was 90% stenosed.  · The Prox RCA-2 lesion was 70% stenosed.  · The Prox RCA-1 lesion was 90% stenosed.  · The Mid LAD-2 lesion was 60% stenosed.  · The estimated blood loss was <50 mL.  · There was three vessel coronary artery disease.    -ANUSHKA score: 6 points, 41% risk at 14 days of: all-cause mortality, new or recurrent MI, or severe recurrent ischemia requiring urgent revascularization.  -SYLVESTER Score: 136 points, 13 % probability of death from admission to 6 months      Plan:  -NTG infusion for chest pain   -Heparin infusion  -Continuous Telemetry monitoring  - 81 mg ASA qd  - Atorvastatin 80mg qhs  - Metoprolol 50 mg XL  - Ticagrelor 90mg bid  - Interventional cardiology consulted, started patient on prednisone 50 mg, famotidine 20 mg, diphenhydramine 50 in anticipation of LHC.   -NPO at midnight for LHC on 02/15/21 " given ongoing bleeding, might defer further.          Acute on chronic diastolic congestive heart failure  CXR nonconcerning for pleural effusion/pulmonary edema, but concerns for CHF, given patient had recent increasing SOB, BNP elevation(580), and NSTEMI. TTE notable for Grade II left ventricular diastolic dysfunction.    Transthoracic echo (TTE) complete[02/12/22]  Summary  · The left ventricle is normal in size with normal systolic function.  · The estimated ejection fraction is 65%.  · There is a minor septal wall motion abnormality.  · Severe left atrial enlargement.  · Grade II left ventricular diastolic dysfunction.  · Normal right ventricular size with normal right ventricular systolic function.  · Normal central venous pressure (3 mmHg).      Plan:  - Cardiac diet with Fluid restriction at 1.5L with strict I/Os and daily STANDING weights  - Maintain on telemetry  - Check Electrolytes, keep Mag >2 & K+ >4  - Ambulate as tolerated . PT/OT consulted    CKD (chronic kidney disease), stage III  Baseline creatinine 1.5- 1.8   Creatinine uptrended from admission, but stable for now.   Recent Labs     02/12/22  0536 02/12/22  1211 02/13/22  0148   CREATININE 1.7* 1.7* 1.7*   BUN 39* 38* 47*     Estimated Creatinine Clearance: 30.8 mL/min (A) (based on SCr of 1.7 mg/dL (H)). according to latest data.     PLAN  Monitor UOP and serial BMP and adjust therapy as needed.   Avoid nephrotoxic meds  Renally dose meds.        Gastroesophageal reflux disease without esophagitis  Stable.    PLAN  famotidine tablet 20 mg    DM type 2, controlled, with complication  Last A1c:   Lab Results   Component Value Date    HGBA1C 5.2 01/27/2022      Blood Sugars (AccuCheck):    Recent Labs     02/12/22  1209 02/12/22  1727 02/12/22  2037 02/13/22  0748   POCTGLUCOSE 158* 158* 183* 155*         PLAN  -Low dose SSI  - Diabetic diet  - POCT glucose ACHS  - Will monitor glucose results and adjust insulin regimen accordingly      Essential  "hypertension, benign  BP was hypertensive at OSH. Stable on admission after initiation of home anti-hypertensives and  nitroglycerin drip .    Home medications:  amLODIPine (NORVASC) 5 MG, benazepriL (LOTENSIN) 40 MG ,     PLAN  metoprolol succinate (TOPROL-XL) 24 hr tablet 50 mg    amLODIPine tablet 10 mg    Hypercholesterolemia  Lab Results   Component Value Date    HDL 53 09/21/2021    LDLCALC 98.2 09/21/2021    CHOL 165 09/21/2021    TRIG 69 09/21/2021     PLAN  Atorvastatin 40mg    Coronary artery disease, multivessel with history of previous PCI  See "NSTEMI (non-ST elevated myocardial infarction)" A&P      Coronary atherosclerosis  See "NSTEMI (non-ST elevated myocardial infarction)" A&P    Rectus sheath hematoma  Patient had severe abdominal pain the day before with a mass on the abdomen. Patient was unable to have a BM yesterday. KUB was drawn and showed a large stool burden. Continued bowel regimen but pain was persistent. CT abdomen without contrast and lactic drawn. CT abdomen showed rectal sheath hematoma extending to the pelvis. IR consulted and recommended CTA of the abdomen to identify bleeding vessel. Due to kidney function, held off on CTA for concerns of contrast induced nephropathy as patient had a Alfredo score of 16 with 100cc of contrast, 15 with 75 cc contrast. IR consulted and following. Hgb went from 13 on admission --> 10.6 --> 9.9 --> 8.9 now    Plan:  - will trend Hgb with CBC for later tonight  -- if hgb drops, then will let IR know and obtain CTA abdomen  - will start fluids to mitigate risk of SHIRA  - patient has iodine allergy already premedicated with benadryl and prednisone    Venous stasis  Chronic history of venous stasis of bilateral lower extremities limited to breakdown of skin without varicose veins  Patient denies increase in leg swelling over her baseline.    PLAN  Nursing wound care PRN  Elevate legs    COPD/emphysema  Known history of severe COPD (GOLD IV D PFT 2020). On " home oxygen (2-3L)    Home medications: albuterol (VENTOLIN HFA) 90 mcg/actuation inhaler, ipratropium-albuteroL (COMBIVENT RESPIMAT)  mcg/actuation inhaler,  umeclidinium (INCRUSE ELLIPTA) 62.5 mcg/actuation inhalation capsule, fluticasone-salmeterol diskus inhaler 250-50 mcg      PLAN  VBG PRN  Supplementary oxygen via NC with titration to maintain  O2 sats >90%  Duonebs q6h while awake  Tiotropium inhaler            VTE Risk Mitigation (From admission, onward)         Ordered     Reason for No Pharmacological VTE Prophylaxis  Once        Question:  Reasons:  Answer:  Physician Provided (leave comment)    02/11/22 1446     IP VTE HIGH RISK PATIENT  Once         02/11/22 1446     Place sequential compression device  Until discontinued         02/11/22 1446                Becca Jacobson MD  Cardiology  [facility]

## 2022-02-14 NOTE — PLAN OF CARE
[facility]  Discharge Reassessment    Primary Care Provider: Khadar Dwyer MD    Expected Discharge Date: 2/18/2022    Reassessment (most recent)     Discharge Reassessment - 02/14/22 1518        Discharge Reassessment    Assessment Type Discharge Planning Reassessment     Did the patient's condition or plan change since previous assessment? Yes     Communicated LUZ with patient/caregiver Date not available/Unable to determine   Pt SILVANO    Discharge Plan A Skilled Nursing Facility     Discharge Plan B Home Health     DME Needed Upon Discharge  none     Discharge Barriers Identified None     Why the patient remains in the hospital Requires continued medical care               Julie Haase RN  Case Management [phone]

## 2022-02-14 NOTE — SUBJECTIVE & OBJECTIVE
Interval History: Patient was kept NPO after MN for LHC planned with IC today. Patient had no bowel movements.    Review of Systems   Constitutional: Negative for decreased appetite, fever, weight gain and weight loss.   HENT: Negative for congestion and sore throat.    Eyes: Negative for blurred vision, vision loss in left eye, vision loss in right eye and visual disturbance.   Cardiovascular: Negative for chest pain, leg swelling (at baseline) and palpitations.   Respiratory: Negative for cough and shortness of breath.    Skin: Negative for flushing and rash.   Gastrointestinal: Positive for abdominal pain and constipation. Negative for diarrhea, heartburn, nausea and vomiting.   Genitourinary: Negative for dysuria and flank pain.   Neurological: Negative for focal weakness, headaches, light-headedness and weakness.   Psychiatric/Behavioral: Negative for altered mental status and substance abuse. The patient is not nervous/anxious.    Allergic/Immunologic: Negative for environmental allergies and hives.     Objective:     Vital Signs (Most Recent):  Temp: 98.7 °F (37.1 °C) (02/14/22 0705)  Pulse: 68 (02/14/22 0821)  Resp: 14 (02/14/22 0821)  BP: (!) 169/72 (02/14/22 0805)  SpO2: 100 % (02/14/22 0821) Vital Signs (24h Range):  Temp:  [97.8 °F (36.6 °C)-98.7 °F (37.1 °C)] 98.7 °F (37.1 °C)  Pulse:  [61-74] 68  Resp:  [14-34] 14  SpO2:  [90 %-100 %] 100 %  BP: (108-169)/(53-72) 169/72     Weight: 89.4 kg (197 lb)  Body mass index is 34.9 kg/m².     SpO2: 100 %  O2 Device (Oxygen Therapy): nasal cannula      Intake/Output Summary (Last 24 hours) at 2/14/2022 0854  Last data filed at 2/14/2022 0705  Gross per 24 hour   Intake 631.51 ml   Output 1700 ml   Net -1068.49 ml       Lines/Drains/Airways     Drain            Female External Urinary Catheter 02/12/22 0700 2 days          Peripheral Intravenous Line                 Peripheral IV - Single Lumen 02/10/22 1744 22 G Posterior;Right Hand 3 days         Peripheral IV  - Single Lumen 02/11/22 1500 20 G Anterior;Right Upper Arm 2 days         Peripheral IV - Single Lumen 02/13/22 2159 22 G Anterior;Left Wrist <1 day                Physical Exam  Vitals and nursing note reviewed.   Constitutional:       General: She is not in acute distress.     Appearance: Normal appearance. She is obese. She is not ill-appearing, toxic-appearing or diaphoretic.   HENT:      Head: Normocephalic and atraumatic.      Right Ear: External ear normal.      Left Ear: External ear normal.   Eyes:      General: No scleral icterus.        Right eye: No discharge.         Left eye: No discharge.      Extraocular Movements: Extraocular movements intact.      Conjunctiva/sclera: Conjunctivae normal.   Cardiovascular:      Rate and Rhythm: Normal rate and regular rhythm.      Pulses: Normal pulses.      Heart sounds: Normal heart sounds.   Pulmonary:      Effort: Pulmonary effort is normal. No respiratory distress.      Breath sounds: No wheezing or rales.      Comments: Decreased breath sounds  Breathing on 2L via NC  Chest:      Chest wall: No tenderness.   Abdominal:      General: There is distension.      Palpations: There is mass.      Tenderness: There is abdominal tenderness. There is guarding.      Comments: distended abdomen exquisitely tender to palpation on LLQ radiating to right side. Mass palpated on LLQ. R abdomen soft but slightly tender. Bowel sounds heard in 2 quadrants   Musculoskeletal:         General: No swelling or tenderness. Normal range of motion.      Cervical back: Normal range of motion and neck supple.      Right lower leg: Edema present.      Left lower leg: Edema present.   Skin:     General: Skin is warm and dry.      Capillary Refill: Capillary refill takes less than 2 seconds.      Findings: Erythema (lower extermity venous stasis) present.   Neurological:      General: No focal deficit present.      Mental Status: She is alert. Mental status is at baseline.   Psychiatric:          Mood and Affect: Mood normal.         Behavior: Behavior normal.         Significant Labs:   CMP   Recent Labs   Lab 02/13/22  0148 02/14/22  0551    139   K 4.3 4.8    104   CO2 24 26   * 162*   BUN 47* 52*   CREATININE 1.7* 1.7*   CALCIUM 9.4 8.8   PROT 6.1 5.9*   ALBUMIN 3.2* 3.0*   BILITOT 0.4 0.4   ALKPHOS 78 63   AST 20 25   ALT 16 17   ANIONGAP 13 9   ESTGFRAFRICA 33.8* 33.8*   EGFRNONAA 29.3* 29.3*    and CBC   Recent Labs   Lab 02/13/22  0148 02/13/22  0148 02/14/22  0551   WBC 11.42  --  14.25*   HGB 10.6*  --  9.9*   HCT 35.3*   < > 32.5*     --  172    < > = values in this interval not displayed.       Significant Imaging: Echocardiogram:   Transthoracic echo (TTE) complete (Cupid Only):   Results for orders placed or performed during the hospital encounter of 02/11/22   Echo   Result Value Ref Range    Ascending aorta 2.24 cm    STJ 2.11 cm    AV mean gradient 8 mmHg    Ao peak asim 1.80 m/s    Ao VTI 36.43 cm    IVRT 91.34 msec    IVS 0.86 0.6 - 1.1 cm    LA size 4.33 cm    Left Atrium Major Axis 5.64 cm    Left Atrium Minor Axis 5.64 cm    LVIDd 4.74 3.5 - 6.0 cm    LVIDs 2.64 2.1 - 4.0 cm    LVOT diameter 2.00 cm    LVOT peak VTI 27.39 cm    Posterior Wall 0.89 0.6 - 1.1 cm    MV Peak A Asim 1.03 m/s    E wave deceleration time 194.92 msec    MV Peak E Asim 0.94 m/s    RA Major Axis 4.52 cm    RA Width 3.45 cm    RVDD 2.85 cm    Sinus 2.57 cm    TAPSE 3.70 cm    TDI LATERAL 0.05 m/s    TDI SEPTAL 0.05 m/s    LA WIDTH 4.64 cm    MV stenosis pressure 1/2 time 56.53 ms    LV Diastolic Volume 104.66 mL    LV Systolic Volume 25.44 mL    RV S' 20.60 cm/s    LVOT peak asim 1.21 m/s    LA volume (mod) 78.65 cm3    MV mean gradient 1 mmHg    MV peak gradient 5 mmHg    MV VTI 28.95 cm    LV LATERAL E/E' RATIO 18.80 m/s    LV SEPTAL E/E' RATIO 18.80 m/s    FS 44 %    LA volume 96.32 cm3    LV mass 139.43 g    Left Ventricle Relative Wall Thickness 0.38 cm    AV valve area 2.36 cm2     AV Velocity Ratio 0.67     AV index (prosthetic) 0.75     MV valve area p 1/2 method 3.89 cm2    MV valve area by continuity eq 2.97 cm2    E/A ratio 0.91     Mean e' 0.05 m/s    LVOT area 3.1 cm2    LVOT stroke volume 86.00 cm3    AV peak gradient 13 mmHg    E/E' ratio 18.80 m/s    LV Systolic Volume Index 13.3 mL/m2    LV Diastolic Volume Index 54.51 mL/m2    LA Volume Index 50.2 mL/m2    LV Mass Index 73 g/m2    LA Volume Index (Mod) 41.0 mL/m2    BSA 1.99 m2    Right Atrial Pressure (from IVC) 3 mmHg    EF 65 %    Narrative    · The left ventricle is normal in size with normal systolic function.  · The estimated ejection fraction is 65%.  · There is a minor septal wall motion abnormality.  · Severe left atrial enlargement.  · Grade II left ventricular diastolic dysfunction.  · Normal right ventricular size with normal right ventricular systolic   function.  · Normal central venous pressure (3 mmHg).       and EKG: NSR at 62 bpm and showed some nonspecific ST changes

## 2022-02-14 NOTE — PLAN OF CARE
CICU DAILY GOALS       A: Awake    RASS: Goal - RASS Goal: 0-->alert and calm  Actual - RASS (Person Agitation-Sedation Scale): 0-->alert and calm   Restraint necessity:    B: Breath   SBT: Not intubated   C: Coordinate A & B, analgesics/sedatives   Pain: managed    SAT: Not intubated  D: Delirium   CAM-ICU: Overall CAM-ICU: Negative  E: Early(intubated/ Progressive (non-intubated) Mobility   MOVE Screen: Pass   Activity: Activity Management: Arm raise - L1,Ankle pumps - L1,Up to bedside commode - L3  FAS: Feeding/Nutrition   Diet order: Diet/Nutrition Received: NPO, Specialty Diet/Nutrition Received:  (cardiac) Fluid restriction: Fluid Requirement: 1500cc FR  T: Thrombus   DVT prophylaxis: VTE Required Core Measure: Pharmacological prophylaxis initiated/maintained  H: HOB Elevation   Head of Bed (HOB) Positioning: HOB at 20-30 degrees  U: Ulcer Prophylaxis   GI: yes  G: Glucose control   managed Glycemic Management: blood glucose monitored  S: Skin   Bundle compliance: yes   Bathing/Skin Care: bath, complete,dressed/undressed,linen changed Date: 02/13/22 at 2300  B: Bowel Function   no issues   I: Indwelling Catheters   Montes necessity:     CVC necessity: No   IPAD offered: No  D: De-escalation Antibx   No  Plan for the day   For high risk PCI today  Family/Goals of care/Code Status   Code Status: Full Code     No acute events throughout day, VS and assessment per flow sheet, patient progressing towards goals as tolerated, plan of care reviewed with Marisol Kerr and family, all concerns addressed, will continue to monitor.

## 2022-02-14 NOTE — NURSING
CT abd/pelvis with findings of rectus sheath hematoma. MD Kavon notified. Heparin gtt D/C'd, will implement. STAT CBC ordered, will implement. Received orders for STAT consult to IR. Will continue to monitor.

## 2022-02-14 NOTE — ASSESSMENT & PLAN NOTE
Patient had severe abdominal pain the day before with a mass on the abdomen. Patient was unable to have a BM yesterday. KUB was drawn and showed a large stool burden. Continued bowel regimen but pain was persistent. CT abdomen without contrast and lactic drawn. CT abdomen showed rectal sheath hematoma extending to the pelvis. IR consulted and recommended CTA of the abdomen to identify bleeding vessel. Due to kidney function, held off on CTA for concerns of contrast induced nephropathy as patient had a Alfredo score of 16 with 100cc of contrast, 15 with 75 cc contrast. IR consulted and following. Hgb went from 13 on admission --> 10.6 --> 9.9 --> 8.9 now    Plan:  - will trend Hgb with CBC for later tonight  -- if hgb drops, then will let IR know and obtain CTA abdomen  - will start fluids to mitigate risk of SHIRA  - patient has iodine allergy already premedicated with benadryl and prednisone

## 2022-02-14 NOTE — PT/OT/SLP PROGRESS
Occupational Therapy      Patient Name:  Marisol Kerr   MRN:  [id number]    Pt had just returned from CT scan this afternoon upon OT arrival. Nsg indicates findings of new bleed and awaiting medical plan from MD's. OT to check status at later date to continue with therapy services as appropriate.     2/14/2022

## 2022-02-14 NOTE — TELEPHONE ENCOUNTER
Call patient - needs post-hospital phone call within 2 business days and hospital follow up visit scheduled within 7-14 days.     Did she go to a facility??

## 2022-02-14 NOTE — PLAN OF CARE
02/14/22 1422   Post-Acute Status   Post-Acute Authorization Placement   Post-Acute Placement Status Pending medical clearance/testing     Discharge disposition pending medical stability - SNF vs .  SW will continue to follow.    Ronda Esquivel LMSW  [facility]  [id number]

## 2022-02-14 NOTE — NURSING TRANSFER
Nursing Transfer Note      2/14/2022     Transfer To: CT from CICU 3086    Transfer via bed    Transfer with cardiac monitoring, 2LNC O2    Transported by RN x2    Medicines sent: no

## 2022-02-14 NOTE — PT/OT/SLP PROGRESS
Physical Therapy      Patient Name:  Marisol Kerr   MRN:  [id number]    Patient not seen today secondary to HOLD. Per RN, CT scan with findings of new bleed and awaiting medical plan from MD's. PT to check status at later date to continue with therapy services as appropriate.     2/14/2022

## 2022-02-14 NOTE — CONSULTS
Radiology Consult    Marisol Kerr is a 76 y.o. female with a history of  DM2, HTN, HLD, CKD, COPD, and recent LHC. Admitted for NTEMI. CT AP showed left-sided rectus sheath hematoma. IR consulted for embolization.    Past Medical History:   Diagnosis Date    CAD (coronary artery disease)     Cancer     colon    CHF (congestive heart failure)     Colitis     Colon cancer     COPD (chronic obstructive pulmonary disease)     Decreased hearing     Diabetes mellitus     Diabetes mellitus, type 2     Hypercholesterolemia     Hypertension     Hypoxemia     Insomnia     Obesity     Osteoarthritis      Past Surgical History:   Procedure Laterality Date    ANGIOGRAM, CORONARY, WITH LEFT HEART CATHETERIZATION N/A 2/10/2022    Procedure: Angiogram, Coronary, with Left Heart Cath;  Surgeon: Cristian Benjamin MD;  Location: [facility] CATH/EP LAB;  Service: Cardiology;  Laterality: N/A;    CARDIAC CATHETERIZATION      CHOLECYSTECTOMY      COLECTOMY      CORONARY ANGIOPLASTY      CORONARY STENT PLACEMENT      EXTERNAL EAR SURGERY      EYE SURGERY      FLEXIBLE SIGMOIDOSCOPY N/A 9/19/2019    Procedure: SIGMOIDOSCOPY, FLEXIBLE;  Surgeon: Biju Kramer III, MD;  Location: [facility] ENDO;  Service: Endoscopy;  Laterality: N/A;    HYSTERECTOMY      partial       Discussed with primary team, Dr. Pablo Bautista.    Imaging reviewed with Radiology staff, Dr. Eduardo.     Procedure: Embolization.    Scheduled Meds:    albuterol-ipratropium  3 mL Nebulization Q6H WAKE    amLODIPine  10 mg Oral Daily    aspirin  81 mg Oral Daily    atorvastatin  80 mg Oral QHS    diphenhydrAMINE  50 mg Oral Q8H    fluticasone furoate-vilanteroL  1 puff Inhalation Daily    metoprolol succinate  50 mg Oral Daily    pantoprazole  40 mg Oral Daily    polyethylene glycol  17 g Oral Daily    predniSONE  50 mg Oral Q8H    ticagrelor  90 mg Oral BID    tiotropium bromide  2 puff Inhalation Daily     Continuous Infusions:    sodium  chloride 0.9% Stopped (02/14/22 0801)    nitroGLYCERIN Stopped (02/14/22 0558)     PRN Meds:acetaminophen, dextrose 10%, dextrose 10%, glucagon (human recombinant), glucose, glucose, HYDROcodone-acetaminophen, insulin aspart U-100, melatonin, ondansetron, ondansetron, senna-docusate 8.6-50 mg, sodium chloride 0.9%, sodium chloride 0.9%    Allergies:   Review of patient's allergies indicates:   Allergen Reactions    Iodinated contrast media     Strawberries [strawberry] Hives and Itching       Labs:  Recent Labs   Lab 02/11/22  1029   INR 1.1       Recent Labs   Lab 02/14/22  0551   WBC 14.25*   HGB 9.9*   HCT 32.5*   MCV 95         Recent Labs   Lab 02/14/22  0551   *      K 4.8      CO2 26   BUN 52*   CREATININE 1.7*   CALCIUM 8.8   MG 2.0   ALT 17   AST 25   ALBUMIN 3.0*   BILITOT 0.4         Vitals (Most Recent):  Temp: 98 °F (36.7 °C) (02/14/22 1105)  Pulse: 64 (02/14/22 1307)  Resp: (!) 22 (02/14/22 1307)  BP: 137/89 (02/14/22 1305)  SpO2: (!) 93 % (02/14/22 1307)    Plan:   - Order CTA AP GI bleeding protocol STAT to evaluate for active bleeding.  - IR will follow.    Lakeshia Bruner MD MSCR  PGY-3 Radiology Resident

## 2022-02-14 NOTE — PLAN OF CARE
CICU DAILY GOALS       A: Awake    RASS: Goal - RASS Goal: 0-->alert and calm  Actual - RASS (Person Agitation-Sedation Scale): 0-->alert and calm   Restraint necessity:    B: Breath   SBT: Not intubated   C: Coordinate A & B, analgesics/sedatives   Pain: managed    SAT: Pass  D: Delirium   CAM-ICU: Overall CAM-ICU: Negative  E: Early(intubated/ Progressive (non-intubated) Mobility   MOVE Screen: Pass   Activity: Activity Management: Up in chair - L1  FAS: Feeding/Nutrition   Diet order: Diet/Nutrition Received: other (see comments) (cardiac diet), Specialty Diet/Nutrition Received: other (see comments) (cardiac diet) Fluid restriction:    T: Thrombus   DVT prophylaxis: VTE Required Core Measure: Pharmacological prophylaxis initiated/maintained  H: HOB Elevation   Head of Bed (HOB) Positioning: HOB elevated  U: Ulcer Prophylaxis   GI: yes  G: Glucose control   managed    S: Skin   Bundle compliance: yes   Bathing/Skin Care: linen changed Date: 2/13/2022  B: Bowel Function   no issues   I: Indwelling Catheters   Montes necessity:     CVC necessity: No   IPAD offered: Not appropriate  D: De-escalation Antibx   No  Plan for the day   Patient remains on heparin and nitro gtts. No chest pain throughout shift. Plan for PCI on Monday. Will continue to monitor.  Family/Goals of care/Code Status   Code Status: Full Code     No acute events throughout day, VS and assessment per flow sheet, patient progressing towards goals as tolerated, plan of care reviewed with Marisol Kerr and family, all concerns addressed, will continue to monitor.

## 2022-02-15 LAB
ALBUMIN SERPL BCP-MCNC: 3 G/DL (ref 3.5–5.2)
ALP SERPL-CCNC: 51 U/L (ref 55–135)
ALT SERPL W/O P-5'-P-CCNC: 20 U/L (ref 10–44)
ANION GAP SERPL CALC-SCNC: 10 MMOL/L (ref 8–16)
AST SERPL-CCNC: 20 U/L (ref 10–40)
BASOPHILS # BLD AUTO: 0.01 K/UL (ref 0–0.2)
BASOPHILS # BLD AUTO: 0.02 K/UL (ref 0–0.2)
BASOPHILS NFR BLD: 0.1 % (ref 0–1.9)
BASOPHILS NFR BLD: 0.1 % (ref 0–1.9)
BILIRUB SERPL-MCNC: 0.5 MG/DL (ref 0.1–1)
BUN SERPL-MCNC: 50 MG/DL (ref 8–23)
CALCIUM SERPL-MCNC: 9 MG/DL (ref 8.7–10.5)
CHLORIDE SERPL-SCNC: 106 MMOL/L (ref 95–110)
CK MB SERPL-MCNC: 1.3 NG/ML (ref 0.1–6.5)
CK MB SERPL-RTO: 1.1 % (ref 0–5)
CK SERPL-CCNC: 114 U/L (ref 20–180)
CO2 SERPL-SCNC: 25 MMOL/L (ref 23–29)
CREAT SERPL-MCNC: 1.7 MG/DL (ref 0.5–1.4)
DIFFERENTIAL METHOD: ABNORMAL
DIFFERENTIAL METHOD: ABNORMAL
EOSINOPHIL # BLD AUTO: 0 K/UL (ref 0–0.5)
EOSINOPHIL # BLD AUTO: 0 K/UL (ref 0–0.5)
EOSINOPHIL NFR BLD: 0 % (ref 0–8)
EOSINOPHIL NFR BLD: 0 % (ref 0–8)
ERYTHROCYTE [DISTWIDTH] IN BLOOD BY AUTOMATED COUNT: 13.2 % (ref 11.5–14.5)
ERYTHROCYTE [DISTWIDTH] IN BLOOD BY AUTOMATED COUNT: 13.3 % (ref 11.5–14.5)
EST. GFR  (AFRICAN AMERICAN): 33.8 ML/MIN/1.73 M^2
EST. GFR  (NON AFRICAN AMERICAN): 29.3 ML/MIN/1.73 M^2
GLUCOSE SERPL-MCNC: 135 MG/DL (ref 70–110)
HCT VFR BLD AUTO: 25.7 % (ref 37–48.5)
HCT VFR BLD AUTO: 27.7 % (ref 37–48.5)
HGB BLD-MCNC: 8 G/DL (ref 12–16)
HGB BLD-MCNC: 8.3 G/DL (ref 12–16)
IMM GRANULOCYTES # BLD AUTO: 0.21 K/UL (ref 0–0.04)
IMM GRANULOCYTES # BLD AUTO: 0.24 K/UL (ref 0–0.04)
IMM GRANULOCYTES NFR BLD AUTO: 1.4 % (ref 0–0.5)
IMM GRANULOCYTES NFR BLD AUTO: 1.5 % (ref 0–0.5)
LYMPHOCYTES # BLD AUTO: 0.5 K/UL (ref 1–4.8)
LYMPHOCYTES # BLD AUTO: 0.5 K/UL (ref 1–4.8)
LYMPHOCYTES NFR BLD: 3.2 % (ref 18–48)
LYMPHOCYTES NFR BLD: 3.3 % (ref 18–48)
MAGNESIUM SERPL-MCNC: 2.1 MG/DL (ref 1.6–2.6)
MCH RBC QN AUTO: 28.7 PG (ref 27–31)
MCH RBC QN AUTO: 29.3 PG (ref 27–31)
MCHC RBC AUTO-ENTMCNC: 30 G/DL (ref 32–36)
MCHC RBC AUTO-ENTMCNC: 31.1 G/DL (ref 32–36)
MCV RBC AUTO: 94 FL (ref 82–98)
MCV RBC AUTO: 96 FL (ref 82–98)
MONOCYTES # BLD AUTO: 0.6 K/UL (ref 0.3–1)
MONOCYTES # BLD AUTO: 1.1 K/UL (ref 0.3–1)
MONOCYTES NFR BLD: 3.6 % (ref 4–15)
MONOCYTES NFR BLD: 6.8 % (ref 4–15)
NEUTROPHILS # BLD AUTO: 13.6 K/UL (ref 1.8–7.7)
NEUTROPHILS # BLD AUTO: 14.3 K/UL (ref 1.8–7.7)
NEUTROPHILS NFR BLD: 88.4 % (ref 38–73)
NEUTROPHILS NFR BLD: 91.6 % (ref 38–73)
NRBC BLD-RTO: 0 /100 WBC
NRBC BLD-RTO: 0 /100 WBC
PHOSPHATE SERPL-MCNC: 4.1 MG/DL (ref 2.7–4.5)
PLATELET # BLD AUTO: 188 K/UL (ref 150–450)
PLATELET # BLD AUTO: 192 K/UL (ref 150–450)
PMV BLD AUTO: 12.4 FL (ref 9.2–12.9)
PMV BLD AUTO: 12.6 FL (ref 9.2–12.9)
POCT GLUCOSE: 125 MG/DL (ref 70–110)
POCT GLUCOSE: 131 MG/DL (ref 70–110)
POCT GLUCOSE: 148 MG/DL (ref 70–110)
POCT GLUCOSE: 157 MG/DL (ref 70–110)
POTASSIUM SERPL-SCNC: 4.7 MMOL/L (ref 3.5–5.1)
PROT SERPL-MCNC: 5.6 G/DL (ref 6–8.4)
RBC # BLD AUTO: 2.73 M/UL (ref 4–5.4)
RBC # BLD AUTO: 2.89 M/UL (ref 4–5.4)
SODIUM SERPL-SCNC: 141 MMOL/L (ref 136–145)
WBC # BLD AUTO: 15.41 K/UL (ref 3.9–12.7)
WBC # BLD AUTO: 15.66 K/UL (ref 3.9–12.7)

## 2022-02-15 PROCEDURE — 80053 COMPREHEN METABOLIC PANEL: CPT | Performed by: STUDENT IN AN ORGANIZED HEALTH CARE EDUCATION/TRAINING PROGRAM

## 2022-02-15 PROCEDURE — 63600175 PHARM REV CODE 636 W HCPCS: Performed by: INTERNAL MEDICINE

## 2022-02-15 PROCEDURE — 94640 AIRWAY INHALATION TREATMENT: CPT

## 2022-02-15 PROCEDURE — 83735 ASSAY OF MAGNESIUM: CPT | Performed by: STUDENT IN AN ORGANIZED HEALTH CARE EDUCATION/TRAINING PROGRAM

## 2022-02-15 PROCEDURE — 25000003 PHARM REV CODE 250: Performed by: STUDENT IN AN ORGANIZED HEALTH CARE EDUCATION/TRAINING PROGRAM

## 2022-02-15 PROCEDURE — 97530 THERAPEUTIC ACTIVITIES: CPT

## 2022-02-15 PROCEDURE — 25000242 PHARM REV CODE 250 ALT 637 W/ HCPCS: Performed by: STUDENT IN AN ORGANIZED HEALTH CARE EDUCATION/TRAINING PROGRAM

## 2022-02-15 PROCEDURE — 99233 PR SUBSEQUENT HOSPITAL CARE,LEVL III: ICD-10-PCS | Mod: GC,,, | Performed by: INTERNAL MEDICINE

## 2022-02-15 PROCEDURE — 94761 N-INVAS EAR/PLS OXIMETRY MLT: CPT

## 2022-02-15 PROCEDURE — 99233 SBSQ HOSP IP/OBS HIGH 50: CPT | Mod: GC,,, | Performed by: INTERNAL MEDICINE

## 2022-02-15 PROCEDURE — 84100 ASSAY OF PHOSPHORUS: CPT | Performed by: STUDENT IN AN ORGANIZED HEALTH CARE EDUCATION/TRAINING PROGRAM

## 2022-02-15 PROCEDURE — 85025 COMPLETE CBC W/AUTO DIFF WBC: CPT | Performed by: STUDENT IN AN ORGANIZED HEALTH CARE EDUCATION/TRAINING PROGRAM

## 2022-02-15 PROCEDURE — 82553 CREATINE MB FRACTION: CPT

## 2022-02-15 PROCEDURE — 27000221 HC OXYGEN, UP TO 24 HOURS

## 2022-02-15 PROCEDURE — 25000003 PHARM REV CODE 250: Performed by: INTERNAL MEDICINE

## 2022-02-15 PROCEDURE — 99900035 HC TECH TIME PER 15 MIN (STAT)

## 2022-02-15 PROCEDURE — 97116 GAIT TRAINING THERAPY: CPT

## 2022-02-15 PROCEDURE — 97535 SELF CARE MNGMENT TRAINING: CPT

## 2022-02-15 PROCEDURE — 85025 COMPLETE CBC W/AUTO DIFF WBC: CPT | Mod: 91 | Performed by: INTERNAL MEDICINE

## 2022-02-15 PROCEDURE — 20000000 HC ICU ROOM

## 2022-02-15 RX ORDER — ASPIRIN 81 MG/1
81 TABLET ORAL DAILY
Status: DISCONTINUED | OUTPATIENT
Start: 2022-02-15 | End: 2022-02-21 | Stop reason: HOSPADM

## 2022-02-15 RX ADMIN — DIPHENHYDRAMINE HYDROCHLORIDE 50 MG: 50 CAPSULE ORAL at 06:02

## 2022-02-15 RX ADMIN — HYDROCODONE BITARTRATE AND ACETAMINOPHEN 1 TABLET: 5; 325 TABLET ORAL at 02:02

## 2022-02-15 RX ADMIN — AMLODIPINE BESYLATE 10 MG: 10 TABLET ORAL at 08:02

## 2022-02-15 RX ADMIN — FLUTICASONE FUROATE AND VILANTEROL TRIFENATATE 1 PUFF: 100; 25 POWDER RESPIRATORY (INHALATION) at 07:02

## 2022-02-15 RX ADMIN — PREDNISONE 50 MG: 20 TABLET ORAL at 06:02

## 2022-02-15 RX ADMIN — IPRATROPIUM BROMIDE AND ALBUTEROL SULFATE 3 ML: 2.5; .5 SOLUTION RESPIRATORY (INHALATION) at 01:02

## 2022-02-15 RX ADMIN — TICAGRELOR 90 MG: 90 TABLET ORAL at 08:02

## 2022-02-15 RX ADMIN — POLYETHYLENE GLYCOL 3350 17 G: 17 POWDER, FOR SOLUTION ORAL at 08:02

## 2022-02-15 RX ADMIN — HYDROCODONE BITARTRATE AND ACETAMINOPHEN 1 TABLET: 5; 325 TABLET ORAL at 10:02

## 2022-02-15 RX ADMIN — PANTOPRAZOLE SODIUM 40 MG: 40 TABLET, DELAYED RELEASE ORAL at 08:02

## 2022-02-15 RX ADMIN — ASPIRIN 81 MG: 81 TABLET, COATED ORAL at 10:02

## 2022-02-15 RX ADMIN — IPRATROPIUM BROMIDE AND ALBUTEROL SULFATE 3 ML: 2.5; .5 SOLUTION RESPIRATORY (INHALATION) at 07:02

## 2022-02-15 RX ADMIN — ATORVASTATIN CALCIUM 80 MG: 20 TABLET, FILM COATED ORAL at 08:02

## 2022-02-15 RX ADMIN — IPRATROPIUM BROMIDE AND ALBUTEROL SULFATE 3 ML: 2.5; .5 SOLUTION RESPIRATORY (INHALATION) at 09:02

## 2022-02-15 RX ADMIN — TICAGRELOR 90 MG: 90 TABLET ORAL at 10:02

## 2022-02-15 RX ADMIN — TIOTROPIUM BROMIDE INHALATION SPRAY 2 PUFF: 3.12 SPRAY, METERED RESPIRATORY (INHALATION) at 07:02

## 2022-02-15 NOTE — PROGRESS NOTES
[facility]  Cardiology  Progress Note    Patient Name: Marisol Kerr  MRN: [id number]  Admission Date: 2/11/2022  Hospital Length of Stay: 4 days  Code Status: Full Code   Attending Physician: Karl Ontiveros MD   Primary Care Physician: Khadar Dwyer MD  Expected Discharge Date: 2/18/2022  Principal Problem:NSTEMI (non-ST elevated myocardial infarction)    Subjective:     Hospital Course:   Patient admitted to CCU while pending evaluation for ACS intervention, given her NSTEMI. Interventional Cardiology and Cardiothoracic Surgery consulted. Initiated guideline directed medical therapy for ACS. Nitroglycerin infusion for chest pain alleviation.  CTS evaluation did not recommend patient for CABG due to her debility as she is prohibitively high risk for CABG. Given contrast allergy, Interventional Cards started on prednisone 50 mg, famotidine 20, diphenhydramine 50 in anticipation of LHC. Patient had significant abdominal pain overnight and distended abdomen with mass. In light of this, IC deferred LHC. CT of the abdomen showed a large rectus sheath hematoma. Given patient's kidney function, CTA was deferred temporarily but the abdominal mass continued to enlarge. CTA of the abdomen showed extravasation of contrast into the rectus sheath hematoma. IR consulted and performed embolization of the inferior epigastric and collateral arteries.       Interval History: Patient went for IR embolization overnight. Returned back to room with no acute events overnight.     Review of Systems   Constitutional: Negative for decreased appetite, fever, weight gain and weight loss.   HENT: Negative for congestion and sore throat.    Eyes: Negative for blurred vision, vision loss in left eye, vision loss in right eye and visual disturbance.   Cardiovascular: Negative for chest pain, leg swelling (at baseline) and palpitations.   Respiratory: Negative for cough and shortness of breath.    Skin: Negative for  flushing and rash.   Gastrointestinal: Positive for constipation. Negative for abdominal pain, diarrhea, heartburn, nausea and vomiting.   Genitourinary: Negative for dysuria and flank pain.   Neurological: Negative for focal weakness, headaches, light-headedness and weakness.   Psychiatric/Behavioral: Negative for altered mental status and substance abuse. The patient is not nervous/anxious.    Allergic/Immunologic: Negative for environmental allergies and hives.     Objective:     Vital Signs (Most Recent):  Temp: 98.2 °F (36.8 °C) (02/15/22 0705)  Pulse: 72 (02/15/22 1205)  Resp: 18 (02/15/22 1205)  BP: (!) 118/56 (02/15/22 1205)  SpO2: 99 % (02/15/22 1205) Vital Signs (24h Range):  Temp:  [97.9 °F (36.6 °C)-98.5 °F (36.9 °C)] 98.2 °F (36.8 °C)  Pulse:  [60-73] 72  Resp:  [17-26] 18  SpO2:  [93 %-100 %] 99 %  BP: (105-175)/(56-89) 118/56     Weight: 89.4 kg (197 lb)  Body mass index is 34.9 kg/m².     SpO2: 99 %  O2 Device (Oxygen Therapy): nasal cannula      Intake/Output Summary (Last 24 hours) at 2/15/2022 1212  Last data filed at 2/15/2022 1105  Gross per 24 hour   Intake 871.31 ml   Output 1100 ml   Net -228.69 ml       Lines/Drains/Airways     Drain            Female External Urinary Catheter 02/12/22 0700 3 days          Peripheral Intravenous Line                 Peripheral IV - Single Lumen 02/14/22 1500 18 G Left Breast <1 day         Peripheral IV - Single Lumen 02/14/22 1500 18 G Right Breast <1 day                Physical Exam  Vitals and nursing note reviewed.   Constitutional:       General: She is not in acute distress.     Appearance: Normal appearance. She is obese. She is not ill-appearing, toxic-appearing or diaphoretic.   HENT:      Head: Normocephalic and atraumatic.      Right Ear: External ear normal.      Left Ear: External ear normal.   Eyes:      General: No scleral icterus.        Right eye: No discharge.         Left eye: No discharge.      Extraocular Movements: Extraocular movements  intact.      Conjunctiva/sclera: Conjunctivae normal.   Cardiovascular:      Rate and Rhythm: Normal rate and regular rhythm.      Pulses: Normal pulses.      Heart sounds: Normal heart sounds.   Pulmonary:      Effort: Pulmonary effort is normal. No respiratory distress.      Breath sounds: No wheezing or rales.      Comments: Decreased breath sounds  Breathing on 2L via NC  Chest:      Chest wall: No tenderness.   Abdominal:      General: There is distension.      Palpations: There is mass.      Tenderness: There is abdominal tenderness. There is no guarding.      Comments: distended abdomen exquisitely tender to palpation on LLQ radiating to right side. Mass palpated on LLQ. R abdomen soft but slightly tender. Bowel sounds heard in 2 quadrants   Musculoskeletal:         General: No swelling or tenderness. Normal range of motion.      Cervical back: Normal range of motion and neck supple.      Right lower leg: Edema present.      Left lower leg: Edema present.   Skin:     General: Skin is warm and dry.      Capillary Refill: Capillary refill takes less than 2 seconds.      Findings: Erythema (lower extermity venous stasis) present.   Neurological:      General: No focal deficit present.      Mental Status: She is alert. Mental status is at baseline.   Psychiatric:         Mood and Affect: Mood normal.         Behavior: Behavior normal.         Significant Labs:   CMP   Recent Labs   Lab 02/14/22  0551 02/15/22  0354    141   K 4.8 4.7    106   CO2 26 25   * 135*   BUN 52* 50*   CREATININE 1.7* 1.7*   CALCIUM 8.8 9.0   PROT 5.9* 5.6*   ALBUMIN 3.0* 3.0*   BILITOT 0.4 0.5   ALKPHOS 63 51*   AST 25 20   ALT 17 20   ANIONGAP 9 10   ESTGFRAFRICA 33.8* 33.8*   EGFRNONAA 29.3* 29.3*   , CBC   Recent Labs   Lab 02/14/22  1336 02/14/22  1336 02/14/22  1952 02/14/22  1952 02/15/22  0354   WBC 15.31*  --  16.62*  --  15.66*   HGB 8.9*  --  8.0*  --  8.3*   HCT 29.4*   < > 26.1*   < > 27.7*     --   174  --  192    < > = values in this interval not displayed.    and All pertinent lab results from the last 24 hours have been reviewed.    Significant Imaging: Echocardiogram:   Transthoracic echo (TTE) complete (Cupid Only):   Results for orders placed or performed during the hospital encounter of 02/11/22   Echo   Result Value Ref Range    Ascending aorta 2.24 cm    STJ 2.11 cm    AV mean gradient 8 mmHg    Ao peak asim 1.80 m/s    Ao VTI 36.43 cm    IVRT 91.34 msec    IVS 0.86 0.6 - 1.1 cm    LA size 4.33 cm    Left Atrium Major Axis 5.64 cm    Left Atrium Minor Axis 5.64 cm    LVIDd 4.74 3.5 - 6.0 cm    LVIDs 2.64 2.1 - 4.0 cm    LVOT diameter 2.00 cm    LVOT peak VTI 27.39 cm    Posterior Wall 0.89 0.6 - 1.1 cm    MV Peak A Asim 1.03 m/s    E wave deceleration time 194.92 msec    MV Peak E Asim 0.94 m/s    RA Major Axis 4.52 cm    RA Width 3.45 cm    RVDD 2.85 cm    Sinus 2.57 cm    TAPSE 3.70 cm    TDI LATERAL 0.05 m/s    TDI SEPTAL 0.05 m/s    LA WIDTH 4.64 cm    MV stenosis pressure 1/2 time 56.53 ms    LV Diastolic Volume 104.66 mL    LV Systolic Volume 25.44 mL    RV S' 20.60 cm/s    LVOT peak asim 1.21 m/s    LA volume (mod) 78.65 cm3    MV mean gradient 1 mmHg    MV peak gradient 5 mmHg    MV VTI 28.95 cm    LV LATERAL E/E' RATIO 18.80 m/s    LV SEPTAL E/E' RATIO 18.80 m/s    FS 44 %    LA volume 96.32 cm3    LV mass 139.43 g    Left Ventricle Relative Wall Thickness 0.38 cm    AV valve area 2.36 cm2    AV Velocity Ratio 0.67     AV index (prosthetic) 0.75     MV valve area p 1/2 method 3.89 cm2    MV valve area by continuity eq 2.97 cm2    E/A ratio 0.91     Mean e' 0.05 m/s    LVOT area 3.1 cm2    LVOT stroke volume 86.00 cm3    AV peak gradient 13 mmHg    E/E' ratio 18.80 m/s    LV Systolic Volume Index 13.3 mL/m2    LV Diastolic Volume Index 54.51 mL/m2    LA Volume Index 50.2 mL/m2    LV Mass Index 73 g/m2    LA Volume Index (Mod) 41.0 mL/m2    BSA 1.99 m2    Right Atrial Pressure (from IVC) 3 mmHg     "EF 65 %    Narrative    · The left ventricle is normal in size with normal systolic function.  · The estimated ejection fraction is 65%.  · There is a minor septal wall motion abnormality.  · Severe left atrial enlargement.  · Grade II left ventricular diastolic dysfunction.  · Normal right ventricular size with normal right ventricular systolic   function.  · Normal central venous pressure (3 mmHg).        CTA abdomen 2/14/2022:   1. Enlarging left rectus sheath hematoma detailed above, with several internal tubular enhancing structures concerning for active arterial extravasation.  2. Stable mild common bile duct and central intrahepatic biliary duct dilatation.  3. Indeterminate right adrenal nodule and left adrenal myelolipoma.  4. Bowel containing ventral abdominal wall hernia.    Assessment and Plan:       * NSTEMI (non-ST elevated myocardial infarction)  -Recent hx of increasing episodes of angina requiring more frequent Nitroglycerin use and underwent LHC at OSH.   -At OSH, Episode of 10/10 chest pain all over that she described as "burning", and "veins being ripped apart". Stat EKG at the time showed concerns for  lateral precordial ST depressions. She was initiated on nitroglycerin drip, heparin drip. One dose of  morphine 2mg & Lopressor 5mg IV were also administered. She reported alleviation of her chest pain after administration. Stable on Nitroglycerin drip. She was transferred to our hospital for further evaluation of her ACS and potential emergent intervention. LHC at outside hospital was notable for significant coronary artery disease:  Recent Left heart cath[02/11/22] showed:  · The RPAV lesion was 100% stenosed.  · The RPDA lesion was 100% stenosed.  · The Prox Cx to Mid Cx lesion was 80% stenosed.  · The Dist Cx lesion was 70% stenosed.  · The 1st LPL lesion was 90% stenosed.  · The Prox RCA-2 lesion was 70% stenosed.  · The Prox RCA-1 lesion was 90% stenosed.  · The Mid LAD-2 lesion was 60% " stenosed.  · The estimated blood loss was <50 mL.  · There was three vessel coronary artery disease.    -ANUSHKA score: 6 points, 41% risk at 14 days of: all-cause mortality, new or recurrent MI, or severe recurrent ischemia requiring urgent revascularization.  -SYLVESTER Score: 136 points, 13 % probability of death from admission to 6 months      Plan:  -NTG infusion for chest pain   -Heparin infusion  -Continuous Telemetry monitoring  - restart 81 mg ASA qd  - Atorvastatin 80mg qhs  - Metoprolol 50 mg XL  - restart Ticagrelor 90mg bid.   - LHC deferred given rectus sheath hematoma. Will touch base with IC and start heparin gtt when IC ok for LHC          Acute on chronic diastolic congestive heart failure  CXR nonconcerning for pleural effusion/pulmonary edema, but concerns for CHF, given patient had recent increasing SOB, BNP elevation(580), and NSTEMI. TTE notable for Grade II left ventricular diastolic dysfunction.    Transthoracic echo (TTE) complete[02/12/22]  Summary  · The left ventricle is normal in size with normal systolic function.  · The estimated ejection fraction is 65%.  · There is a minor septal wall motion abnormality.  · Severe left atrial enlargement.  · Grade II left ventricular diastolic dysfunction.  · Normal right ventricular size with normal right ventricular systolic function.  · Normal central venous pressure (3 mmHg).      Plan:  - Cardiac diet with Fluid restriction at 1.5L with strict I/Os and daily STANDING weights  - Maintain on telemetry  - Check Electrolytes, keep Mag >2 & K+ >4  - Ambulate as tolerated . PT/OT consulted    CKD (chronic kidney disease), stage III  Baseline creatinine 1.5- 1.8   Creatinine uptrended from admission, but stable for now.   Recent Labs     02/12/22  0536 02/12/22  1211 02/13/22  0148   CREATININE 1.7* 1.7* 1.7*   BUN 39* 38* 47*     Estimated Creatinine Clearance: 30.8 mL/min (A) (based on SCr of 1.7 mg/dL (H)). according to latest data.     PLAN  Monitor UOP  "and serial BMP and adjust therapy as needed.   Avoid nephrotoxic meds  Renally dose meds.        Gastroesophageal reflux disease without esophagitis  Stable.    PLAN  famotidine tablet 20 mg    DM type 2, controlled, with complication  Last A1c:   Lab Results   Component Value Date    HGBA1C 5.2 01/27/2022      Blood Sugars (AccuCheck):    Recent Labs     02/12/22  1209 02/12/22  1727 02/12/22  2037 02/13/22  0748   POCTGLUCOSE 158* 158* 183* 155*         PLAN  -Low dose SSI  - Diabetic diet  - POCT glucose ACHS  - Will monitor glucose results and adjust insulin regimen accordingly      Essential hypertension, benign  BP was hypertensive at OSH. Stable on admission after initiation of home anti-hypertensives and  nitroglycerin drip .    Home medications:  amLODIPine (NORVASC) 5 MG, benazepriL (LOTENSIN) 40 MG ,     PLAN  metoprolol succinate (TOPROL-XL) 24 hr tablet 50 mg    amLODIPine tablet 10 mg    Hypercholesterolemia  Lab Results   Component Value Date    HDL 53 09/21/2021    LDLCALC 98.2 09/21/2021    CHOL 165 09/21/2021    TRIG 69 09/21/2021     PLAN  Atorvastatin 40mg    Coronary artery disease, multivessel with history of previous PCI  See "NSTEMI (non-ST elevated myocardial infarction)" A&P      Coronary atherosclerosis  See "NSTEMI (non-ST elevated myocardial infarction)" A&P    Rectus sheath hematoma  Patient had severe abdominal pain the day before with a mass on the abdomen. Patient was unable to have a BM yesterday. KUB was drawn and showed a large stool burden. Continued bowel regimen but pain was persistent. CT abdomen without contrast and lactic drawn. CT abdomen showed rectal sheath hematoma extending to the pelvis. IR consulted and recommended CTA of the abdomen to identify bleeding vessel. Due to kidney function, held off on CTA for concerns of contrast induced nephropathy as patient had a Alfreod score of 16 with 100cc of contrast, 15 with 75 cc contrast. IR consulted and following. Hgb went " from 13 on admission --> 10.6 --> 9.9 --> 8.9. hematoma was expanding. IR performed embolization of inferior epigastric and collateral vessels.     Plan:  - continue to monitor abdominal mass    Venous stasis  Chronic history of venous stasis of bilateral lower extremities limited to breakdown of skin without varicose veins  Patient denies increase in leg swelling over her baseline.    PLAN  Nursing wound care PRN  Elevate legs    COPD/emphysema  Known history of severe COPD (GOLD IV D PFT 2020). On home oxygen (2-3L)    Home medications: albuterol (VENTOLIN HFA) 90 mcg/actuation inhaler, ipratropium-albuteroL (COMBIVENT RESPIMAT)  mcg/actuation inhaler,  umeclidinium (INCRUSE ELLIPTA) 62.5 mcg/actuation inhalation capsule, fluticasone-salmeterol diskus inhaler 250-50 mcg      PLAN  VBG PRN  Supplementary oxygen via NC with titration to maintain  O2 sats >90%  Duonebs q6h while awake  Tiotropium inhaler            VTE Risk Mitigation (From admission, onward)         Ordered     Reason for No Pharmacological VTE Prophylaxis  Once        Question:  Reasons:  Answer:  Physician Provided (leave comment)    02/11/22 1446     IP VTE HIGH RISK PATIENT  Once         02/11/22 1446     Place sequential compression device  Until discontinued         02/11/22 1446                Becca Jacobson MD  Cardiology  [facility]

## 2022-02-15 NOTE — PLAN OF CARE
Hemostasis obtained @ 0001. 4 hours of flat time ordered by MD. Pt tolerated procedure well. RIGHT groin site CDI without bleeding, swelling, or hematoma noted. Pt educated R/T importance of remaining flat without moving his/her RIGHT leg, notifying staff of any sensation of pain, wetness, or visualizing bleeding from the site.

## 2022-02-15 NOTE — PLAN OF CARE
Pt received into IR Rm 88. Patient AAOx3, no distress noted, respirations even and unlabored, VS stable, will continue to monitor. Acceptance of education, consents signed, H/P done. Labs reviewed.

## 2022-02-15 NOTE — ASSESSMENT & PLAN NOTE
"-Recent hx of increasing episodes of angina requiring more frequent Nitroglycerin use and underwent LHC at OSH.   -At OSH, Episode of 10/10 chest pain all over that she described as "burning", and "veins being ripped apart". Stat EKG at the time showed concerns for  lateral precordial ST depressions. She was initiated on nitroglycerin drip, heparin drip. One dose of  morphine 2mg & Lopressor 5mg IV were also administered. She reported alleviation of her chest pain after administration. Stable on Nitroglycerin drip. She was transferred to our hospital for further evaluation of her ACS and potential emergent intervention. LHC at outside hospital was notable for significant coronary artery disease:  Recent Left heart cath[02/11/22] showed:  · The RPAV lesion was 100% stenosed.  · The RPDA lesion was 100% stenosed.  · The Prox Cx to Mid Cx lesion was 80% stenosed.  · The Dist Cx lesion was 70% stenosed.  · The 1st LPL lesion was 90% stenosed.  · The Prox RCA-2 lesion was 70% stenosed.  · The Prox RCA-1 lesion was 90% stenosed.  · The Mid LAD-2 lesion was 60% stenosed.  · The estimated blood loss was <50 mL.  · There was three vessel coronary artery disease.    -ANUSHKA score: 6 points, 41% risk at 14 days of: all-cause mortality, new or recurrent MI, or severe recurrent ischemia requiring urgent revascularization.  -SYLVESTER Score: 136 points, 13 % probability of death from admission to 6 months      Plan:  -NTG infusion for chest pain   -Heparin infusion  -Continuous Telemetry monitoring  - restart 81 mg ASA qd  - Atorvastatin 80mg qhs  - Metoprolol 50 mg XL  - restart Ticagrelor 90mg bid  - Interventional cardiology consulted, started patient on prednisone 50 mg, famotidine 20 mg, diphenhydramine 50 in anticipation of LHC.   - LHC deferred given rectus sheath hematoma. Will touch base with IC and start heparin gtt when IC ok for LHC        "

## 2022-02-15 NOTE — SUBJECTIVE & OBJECTIVE
Interval History: Patient went for IR embolization overnight. Returned back to room with no acute events overnight.     Review of Systems   Constitutional: Negative for decreased appetite, fever, weight gain and weight loss.   HENT: Negative for congestion and sore throat.    Eyes: Negative for blurred vision, vision loss in left eye, vision loss in right eye and visual disturbance.   Cardiovascular: Negative for chest pain, leg swelling (at baseline) and palpitations.   Respiratory: Negative for cough and shortness of breath.    Skin: Negative for flushing and rash.   Gastrointestinal: Positive for constipation. Negative for abdominal pain, diarrhea, heartburn, nausea and vomiting.   Genitourinary: Negative for dysuria and flank pain.   Neurological: Negative for focal weakness, headaches, light-headedness and weakness.   Psychiatric/Behavioral: Negative for altered mental status and substance abuse. The patient is not nervous/anxious.    Allergic/Immunologic: Negative for environmental allergies and hives.     Objective:     Vital Signs (Most Recent):  Temp: 98.2 °F (36.8 °C) (02/15/22 0705)  Pulse: 72 (02/15/22 1205)  Resp: 18 (02/15/22 1205)  BP: (!) 118/56 (02/15/22 1205)  SpO2: 99 % (02/15/22 1205) Vital Signs (24h Range):  Temp:  [97.9 °F (36.6 °C)-98.5 °F (36.9 °C)] 98.2 °F (36.8 °C)  Pulse:  [60-73] 72  Resp:  [17-26] 18  SpO2:  [93 %-100 %] 99 %  BP: (105-175)/(56-89) 118/56     Weight: 89.4 kg (197 lb)  Body mass index is 34.9 kg/m².     SpO2: 99 %  O2 Device (Oxygen Therapy): nasal cannula      Intake/Output Summary (Last 24 hours) at 2/15/2022 1212  Last data filed at 2/15/2022 1105  Gross per 24 hour   Intake 871.31 ml   Output 1100 ml   Net -228.69 ml       Lines/Drains/Airways     Drain            Female External Urinary Catheter 02/12/22 0700 3 days          Peripheral Intravenous Line                 Peripheral IV - Single Lumen 02/14/22 1500 18 G Left Breast <1 day         Peripheral IV - Single  Lumen 02/14/22 1500 18 G Right Breast <1 day                Physical Exam  Vitals and nursing note reviewed.   Constitutional:       General: She is not in acute distress.     Appearance: Normal appearance. She is obese. She is not ill-appearing, toxic-appearing or diaphoretic.   HENT:      Head: Normocephalic and atraumatic.      Right Ear: External ear normal.      Left Ear: External ear normal.   Eyes:      General: No scleral icterus.        Right eye: No discharge.         Left eye: No discharge.      Extraocular Movements: Extraocular movements intact.      Conjunctiva/sclera: Conjunctivae normal.   Cardiovascular:      Rate and Rhythm: Normal rate and regular rhythm.      Pulses: Normal pulses.      Heart sounds: Normal heart sounds.   Pulmonary:      Effort: Pulmonary effort is normal. No respiratory distress.      Breath sounds: No wheezing or rales.      Comments: Decreased breath sounds  Breathing on 2L via NC  Chest:      Chest wall: No tenderness.   Abdominal:      General: There is distension.      Palpations: There is mass.      Tenderness: There is abdominal tenderness. There is no guarding.      Comments: distended abdomen exquisitely tender to palpation on LLQ radiating to right side. Mass palpated on LLQ. R abdomen soft but slightly tender. Bowel sounds heard in 2 quadrants   Musculoskeletal:         General: No swelling or tenderness. Normal range of motion.      Cervical back: Normal range of motion and neck supple.      Right lower leg: Edema present.      Left lower leg: Edema present.   Skin:     General: Skin is warm and dry.      Capillary Refill: Capillary refill takes less than 2 seconds.      Findings: Erythema (lower extermity venous stasis) present.   Neurological:      General: No focal deficit present.      Mental Status: She is alert. Mental status is at baseline.   Psychiatric:         Mood and Affect: Mood normal.         Behavior: Behavior normal.         Significant Labs:   CMP    Recent Labs   Lab 02/14/22  0551 02/15/22  0354    141   K 4.8 4.7    106   CO2 26 25   * 135*   BUN 52* 50*   CREATININE 1.7* 1.7*   CALCIUM 8.8 9.0   PROT 5.9* 5.6*   ALBUMIN 3.0* 3.0*   BILITOT 0.4 0.5   ALKPHOS 63 51*   AST 25 20   ALT 17 20   ANIONGAP 9 10   ESTGFRAFRICA 33.8* 33.8*   EGFRNONAA 29.3* 29.3*   , CBC   Recent Labs   Lab 02/14/22  1336 02/14/22  1336 02/14/22  1952 02/14/22  1952 02/15/22  0354   WBC 15.31*  --  16.62*  --  15.66*   HGB 8.9*  --  8.0*  --  8.3*   HCT 29.4*   < > 26.1*   < > 27.7*     --  174  --  192    < > = values in this interval not displayed.    and All pertinent lab results from the last 24 hours have been reviewed.    Significant Imaging: Echocardiogram:   Transthoracic echo (TTE) complete (Cupid Only):   Results for orders placed or performed during the hospital encounter of 02/11/22   Echo   Result Value Ref Range    Ascending aorta 2.24 cm    STJ 2.11 cm    AV mean gradient 8 mmHg    Ao peak asim 1.80 m/s    Ao VTI 36.43 cm    IVRT 91.34 msec    IVS 0.86 0.6 - 1.1 cm    LA size 4.33 cm    Left Atrium Major Axis 5.64 cm    Left Atrium Minor Axis 5.64 cm    LVIDd 4.74 3.5 - 6.0 cm    LVIDs 2.64 2.1 - 4.0 cm    LVOT diameter 2.00 cm    LVOT peak VTI 27.39 cm    Posterior Wall 0.89 0.6 - 1.1 cm    MV Peak A Asim 1.03 m/s    E wave deceleration time 194.92 msec    MV Peak E Asim 0.94 m/s    RA Major Axis 4.52 cm    RA Width 3.45 cm    RVDD 2.85 cm    Sinus 2.57 cm    TAPSE 3.70 cm    TDI LATERAL 0.05 m/s    TDI SEPTAL 0.05 m/s    LA WIDTH 4.64 cm    MV stenosis pressure 1/2 time 56.53 ms    LV Diastolic Volume 104.66 mL    LV Systolic Volume 25.44 mL    RV S' 20.60 cm/s    LVOT peak asim 1.21 m/s    LA volume (mod) 78.65 cm3    MV mean gradient 1 mmHg    MV peak gradient 5 mmHg    MV VTI 28.95 cm    LV LATERAL E/E' RATIO 18.80 m/s    LV SEPTAL E/E' RATIO 18.80 m/s    FS 44 %    LA volume 96.32 cm3    LV mass 139.43 g    Left Ventricle Relative Wall  Thickness 0.38 cm    AV valve area 2.36 cm2    AV Velocity Ratio 0.67     AV index (prosthetic) 0.75     MV valve area p 1/2 method 3.89 cm2    MV valve area by continuity eq 2.97 cm2    E/A ratio 0.91     Mean e' 0.05 m/s    LVOT area 3.1 cm2    LVOT stroke volume 86.00 cm3    AV peak gradient 13 mmHg    E/E' ratio 18.80 m/s    LV Systolic Volume Index 13.3 mL/m2    LV Diastolic Volume Index 54.51 mL/m2    LA Volume Index 50.2 mL/m2    LV Mass Index 73 g/m2    LA Volume Index (Mod) 41.0 mL/m2    BSA 1.99 m2    Right Atrial Pressure (from IVC) 3 mmHg    EF 65 %    Narrative    · The left ventricle is normal in size with normal systolic function.  · The estimated ejection fraction is 65%.  · There is a minor septal wall motion abnormality.  · Severe left atrial enlargement.  · Grade II left ventricular diastolic dysfunction.  · Normal right ventricular size with normal right ventricular systolic   function.  · Normal central venous pressure (3 mmHg).        CTA abdomen 2/14/2022:   1. Enlarging left rectus sheath hematoma detailed above, with several internal tubular enhancing structures concerning for active arterial extravasation.  2. Stable mild common bile duct and central intrahepatic biliary duct dilatation.  3. Indeterminate right adrenal nodule and left adrenal myelolipoma.  4. Bowel containing ventral abdominal wall hernia.

## 2022-02-15 NOTE — PROCEDURES
[facility] - Cardiac Intensive Care  Interventional Radiology  High Risk Procedure - Inpatient      Date: 02/15/2022 Time: 12:04 AM    Pre-Op Diagnosis: NSTEMI, left rectus sheath hematoma    Post-Op Diagnosis: same    Procedure Performed by: [place]    Assistant: N/A    Procedure: Visceral angiogram with embolization of left inferior epigastric artery (vs.collateral vessel supplying inferior epigastric artery distribution)    Specimen/Tissue Removed: N/A    Estimated Blood Loss: less than 50 mL    Procedure Note/Findings: No active bleeding site identified.  Vessel believed to be supplying possible bleed seen on CTA involving left rectus sheath (left inferior epigastric artery vs.collateral vessel supplying inferior epigastric artery distribution) embolized using gelfoam plus nohemi coils. Patient tolerated procedure well.       Please refer to dictated report for additional details.

## 2022-02-15 NOTE — PLAN OF CARE
CICU DAILY GOALS       A: Awake    RASS: Goal - RASS Goal: 0-->alert and calm  Actual - RASS (Person Agitation-Sedation Scale): 0-->alert and calm   Restraint necessity:    B: Breath   SBT: Not intubated   C: Coordinate A & B, analgesics/sedatives   Pain: managed    SAT: Not intubated  D: Delirium   CAM-ICU: Overall CAM-ICU: Negative  E: Early(intubated/ Progressive (non-intubated) Mobility   MOVE Screen: Pass   Activity: Activity Management: Rolling - L1  FAS: Feeding/Nutrition   Diet order: Diet/Nutrition Received: NPO, Specialty Diet/Nutrition Received:  (cardiac) Fluid restriction: Fluid Requirement: 1500cc FR  T: Thrombus   DVT prophylaxis: VTE Required Core Measure: Pharmacological prophylaxis initiated/maintained  H: HOB Elevation   Head of Bed (HOB) Positioning: HOB elevated  U: Ulcer Prophylaxis   GI: yes  G: Glucose control   managed Glycemic Management: blood glucose monitored  S: Skin   Bundle compliance: yes   Bathing/Skin Care: bath, partial,linen changed,incontinence care,dressed/undressed Date: 2/14/2022  B: Bowel Function   no issues   I: Indwelling Catheters   Montes necessity:     CVC necessity: No   IPAD offered: Not appropriate  D: De-escalation Antibx   No  Plan for the day  No chest pain throughout shift. However, CT abd/pelvis done today with findings of rectus sheath hematoma. Heparin gtt discontinued and PCI placed on hold. IR to determine plan of care for management of rectus sheath hematoma. Will wait for IR and continue to monitor.  Family/Goals of care/Code Status   Code Status: Full Code     No acute events throughout day, VS and assessment per flow sheet, patient progressing towards goals as tolerated, plan of care reviewed with Marisol Kerr and family, all concerns addressed, will continue to monitor.

## 2022-02-15 NOTE — PT/OT/SLP PROGRESS
Occupational Therapy  Co- Treatment    Name: Marisol Kerr  MRN: [id number]  Admitting Diagnosis:  NSTEMI (non-ST elevated myocardial infarction)     Pt s/p rectus hematoma and s/p embolization.     Recommendations:     Discharge Recommendations: nursing facility, skilled    Assessment:     Marisol Kerr is a 76 y.o. female with a medical diagnosis of NSTEMI (non-ST elevated myocardial infarction).  . Performance deficits affecting function are weakness,impaired endurance,impaired self care skills,impaired functional mobilty,gait instability,impaired balance,decreased upper extremity function,decreased lower extremity function,decreased safety awareness.   Pt tolerated session well with good effort and performance. Anticipate pt may benefit from post acute therapy prior to rerturn home.   Rehab Prognosis:  Good; patient would benefit from acute skilled OT services to address these deficits and reach maximum level of function.       Plan:     Patient to be seen 4 x/week to address the above listed problems via self-care/home management,therapeutic activities,therapeutic exercises  · Plan of Care Expires: 03/14/22  · Plan of Care Reviewed with: patient    Subjective   Pt agreeable to therapy.     Pain/Comfort:  · Pain Rating 1: 0/10    Objective:     Communicated with: nsg prior to session.  Patient found supine in bed with tele, pulse ox, BP cuff, 2 LPM oxygen via NC.  Cotx completed this date to optimize functional performance and safety given impaired tolerance for activity    General Precautions: Standard, fall     Occupational Performance:     Bed Mobility:    Supine>sit with MIN A     Functional Mobility/Transfers:  · Sit>stand with MIN A   · Sit>stand from BSC with CGA  · Pt used RW for transfers and steps taken to these surfaces.   ·     Activities of Daily Living:  Feeding: set-up  G/H; seated with set-up   Toileting CGA  LE dressing: TOTAL A       AMPAC 6 Click ADL: 16    Treatment & Education:  Pt demo Fair+  sitting balance EOB. Pt able to t/f bed, BSC and chair with CGA with RW.   Pt needing cues for hand placement and safety with t/f. Pt needing cues for redirection as pt is verbose with decreased attention to task. Pt is also hearing impaired making communication difficult at times. Increased time needed for all transitions.   Education provided re: OT POC and safety with functional mobility/ADl skills.     Patient left up in chair with all lines intact, call button in reach and nsg notifiedEducation:      GOALS:   Multidisciplinary Problems     Occupational Therapy Goals        Problem: Occupational Therapy Goal    Goal Priority Disciplines Outcome Interventions   Occupational Therapy Goal     OT, PT/OT Ongoing, Progressing    Description: Goals to be met by: 2/26/2022      Patient will increase functional independence with ADLs by performing:    LE Dressing using AE PRN with Stand-by Assistance.  Grooming while standing with Stand-by Assistance.  Toileting from toilet with Stand-by Assistance for hygiene and clothing management.   Supine to sit with Stand-by Assistance.  Step transfer with Stand-by Assistance                     Time Tracking:     OT Date of Treatment: 02/15/22  OT Start Time: 0928  OT Stop Time: 0952  OT Total Time (min): 24 min    Billable Minutes:Self Care/Home Management 15  Therapeutic Activity 9      OT/LONA: OT     LONA Visit Number: 0    2/15/2022

## 2022-02-15 NOTE — ASSESSMENT & PLAN NOTE
Patient had severe abdominal pain the day before with a mass on the abdomen. Patient was unable to have a BM yesterday. KUB was drawn and showed a large stool burden. Continued bowel regimen but pain was persistent. CT abdomen without contrast and lactic drawn. CT abdomen showed rectal sheath hematoma extending to the pelvis. IR consulted and recommended CTA of the abdomen to identify bleeding vessel. Due to kidney function, held off on CTA for concerns of contrast induced nephropathy as patient had a Alfredo score of 16 with 100cc of contrast, 15 with 75 cc contrast. IR consulted and following. Hgb went from 13 on admission --> 10.6 --> 9.9 --> 8.9. hematoma was expanding. IR performed embolization of inferior epigastric and collateral vessels.     Plan:  - continue to monitor abdominal mass

## 2022-02-15 NOTE — PT/OT/SLP PROGRESS
Physical Therapy Treatment    Patient Name:  Marisol Kerr   MRN:  [id number]  Admit Date: 2022  Admitting Diagnosis:  NSTEMI (non-ST elevated myocardial infarction)   Length of Stay: 4 days  Recent Surgery: Procedure(s) (LRB):  Left heart cath (Left)      Recommendations:     Discharge Recommendations:  nursing facility, skilled   Discharge Equipment Recommendations: other (see comments) (tbd)   Barriers to discharge: None    Plan:     During this hospitalization, patient to be seen 4 x/week to address the listed problems via gait training,therapeutic activities,therapeutic exercises,neuromuscular re-education  · Plan of Care Expires:  22   Plan of Care Reviewed with: patient    Assessment:     Marisol Kerr is a 76 y.o. female admitted with a medical diagnosis of NSTEMI (non-ST elevated myocardial infarction). Pt found alert and cooperative. Pt medically stable per RN. Pt able to perform 2 stand pivot t/fs with CGA using RW. Pt with c/o weakness in legs, and decreased endurance. POC for next session will be to increase ambulation distance as tolerated.     Problem List: weakness,gait instability,impaired endurance,impaired self care skills,impaired functional mobilty,impaired cardiopulmonary response to activity.  Rehab Prognosis: Good     GOALS:   Multidisciplinary Problems     Physical Therapy Goals        Problem: Physical Therapy Goal    Goal Priority Disciplines Outcome Goal Variances Interventions   Physical Therapy Goal     PT, PT/OT Ongoing, Progressing     Description: Goals to be met by:      Patient will increase functional independence with mobility by performin. Supine to sit with Modified [place]  2. Sit to supine with Modified [place]  3. Sit to stand transfer with Modified [place]  4. Gait  x 50 feet with Modified [place] using LRAD.   5. Ascend/Descend 6 inch curb step with Contact Guard Assistance using LRAD.                     Subjective   Communicated with  "RN prior to session.  Patient found HOB elevated upon PT entry to room, agreeable to evaluation. Marisol Kerr's alone during session.    Chief Complaint: n/a  Patient/Family Comments/goals: return home  Pain/Comfort:  · Pain Rating 1: 0/10    Objective:   Patient found with: blood pressure cuff,pulse ox (continuous),telemetry,oxygen,PureWick,peripheral IV   General Precautions: Standard, Cardiac fall   Orthopedic Precautions:N/A   Braces: N/A   Oxygen Device: Nasal Cannula  Vitals: BP (!) 144/69 (BP Location: Right arm, Patient Position: Lying)   Pulse 68   Temp 98.2 °F (36.8 °C) (Oral)   Resp 20   Ht 5' 3" (1.6 m)   Wt 89.4 kg (197 lb)   SpO2 100%   BMI 34.90 kg/m²     Outcome Measures:  AM-PAC 6 CLICK MOBILITY  Turning over in bed (including adjusting bedclothes, sheets and blankets)?: 3  Sitting down on and standing up from a chair with arms (e.g., wheelchair, bedside commode, etc.): 3  Moving from lying on back to sitting on the side of the bed?: 3  Moving to and from a bed to a chair (including a wheelchair)?: 3  Need to walk in hospital room?: 3  Climbing 3-5 steps with a railing?: 1  Basic Mobility Total Score: 16     Functional Mobility:  Additional staff present: OT - multiple skilled therapists required for safety, v/s monitoring, and recent medical changes  Bed Mobility:    Supine to Sit: minimum assistance; HOB elevated   o Pt able to initiate mvt but required assistance with lifting trunk off bed.     Sitting Balance at Edge of Bed:   Assistance Level Required: Stand-by Assistance   Time: 5 min   Postural deviations noted: slouched posture and rounded shoulders    Transfers:    Sit <> Stand Transfer: with rolling walker x2 trials  o From EOB: min A  o From BSC: CGA   Bed <> BSC Transfer: Stand Pivot technique with contact guard assistance with rolling walker  o Chair on patient's L  · BSC <> Chair Transfer: Stand Pivot technique with contact guard assistance with rolling walker  · Chair on " pt's L      Therapeutic Activities, Exercises, and Education:   Pt educated on role of PT/POC  Pt educated on importance of OOB activity and daily ambulation    Patient left up in chair with all lines intact, call button in reach and RN present..    Time Tracking:     PT Received On: 02/15/22  PT Start Time: 0928     PT Stop Time: 0954  PT Total Time (min): 26 min     Billable Minutes:   · Gait Training 8 and Therapeutic Activity 15    Treatment Type: Treatment  PT/PTA: PT

## 2022-02-16 LAB
ALBUMIN SERPL BCP-MCNC: 3.1 G/DL (ref 3.5–5.2)
ALP SERPL-CCNC: 55 U/L (ref 55–135)
ALT SERPL W/O P-5'-P-CCNC: 27 U/L (ref 10–44)
ANION GAP SERPL CALC-SCNC: 10 MMOL/L (ref 8–16)
AST SERPL-CCNC: 28 U/L (ref 10–40)
BASOPHILS # BLD AUTO: 0.01 K/UL (ref 0–0.2)
BASOPHILS # BLD AUTO: 0.03 K/UL (ref 0–0.2)
BASOPHILS NFR BLD: 0.1 % (ref 0–1.9)
BASOPHILS NFR BLD: 0.2 % (ref 0–1.9)
BILIRUB SERPL-MCNC: 0.5 MG/DL (ref 0.1–1)
BUN SERPL-MCNC: 59 MG/DL (ref 8–23)
CALCIUM SERPL-MCNC: 9 MG/DL (ref 8.7–10.5)
CHLORIDE SERPL-SCNC: 107 MMOL/L (ref 95–110)
CK MB SERPL-MCNC: 1.6 NG/ML (ref 0.1–6.5)
CK MB SERPL-RTO: 0.4 % (ref 0–5)
CK SERPL-CCNC: 416 U/L (ref 20–180)
CO2 SERPL-SCNC: 27 MMOL/L (ref 23–29)
CREAT SERPL-MCNC: 1.9 MG/DL (ref 0.5–1.4)
DIFFERENTIAL METHOD: ABNORMAL
DIFFERENTIAL METHOD: ABNORMAL
EOSINOPHIL # BLD AUTO: 0.1 K/UL (ref 0–0.5)
EOSINOPHIL # BLD AUTO: 0.1 K/UL (ref 0–0.5)
EOSINOPHIL NFR BLD: 0.3 % (ref 0–8)
EOSINOPHIL NFR BLD: 0.6 % (ref 0–8)
ERYTHROCYTE [DISTWIDTH] IN BLOOD BY AUTOMATED COUNT: 13.2 % (ref 11.5–14.5)
ERYTHROCYTE [DISTWIDTH] IN BLOOD BY AUTOMATED COUNT: 13.3 % (ref 11.5–14.5)
EST. GFR  (AFRICAN AMERICAN): 29.5 ML/MIN/1.73 M^2
EST. GFR  (NON AFRICAN AMERICAN): 25.6 ML/MIN/1.73 M^2
GLUCOSE SERPL-MCNC: 87 MG/DL (ref 70–110)
HCT VFR BLD AUTO: 24.6 % (ref 37–48.5)
HCT VFR BLD AUTO: 26.1 % (ref 37–48.5)
HGB BLD-MCNC: 7.3 G/DL (ref 12–16)
HGB BLD-MCNC: 7.9 G/DL (ref 12–16)
IMM GRANULOCYTES # BLD AUTO: 0.14 K/UL (ref 0–0.04)
IMM GRANULOCYTES # BLD AUTO: 0.26 K/UL (ref 0–0.04)
IMM GRANULOCYTES NFR BLD AUTO: 1 % (ref 0–0.5)
IMM GRANULOCYTES NFR BLD AUTO: 1.5 % (ref 0–0.5)
LYMPHOCYTES # BLD AUTO: 1.6 K/UL (ref 1–4.8)
LYMPHOCYTES # BLD AUTO: 1.7 K/UL (ref 1–4.8)
LYMPHOCYTES NFR BLD: 11.2 % (ref 18–48)
LYMPHOCYTES NFR BLD: 9.7 % (ref 18–48)
MAGNESIUM SERPL-MCNC: 2.2 MG/DL (ref 1.6–2.6)
MCH RBC QN AUTO: 28.5 PG (ref 27–31)
MCH RBC QN AUTO: 28.7 PG (ref 27–31)
MCHC RBC AUTO-ENTMCNC: 29.7 G/DL (ref 32–36)
MCHC RBC AUTO-ENTMCNC: 30.3 G/DL (ref 32–36)
MCV RBC AUTO: 94 FL (ref 82–98)
MCV RBC AUTO: 97 FL (ref 82–98)
MONOCYTES # BLD AUTO: 1.1 K/UL (ref 0.3–1)
MONOCYTES # BLD AUTO: 1.5 K/UL (ref 0.3–1)
MONOCYTES NFR BLD: 8 % (ref 4–15)
MONOCYTES NFR BLD: 8.7 % (ref 4–15)
NEUTROPHILS # BLD AUTO: 11 K/UL (ref 1.8–7.7)
NEUTROPHILS # BLD AUTO: 13.6 K/UL (ref 1.8–7.7)
NEUTROPHILS NFR BLD: 79.1 % (ref 38–73)
NEUTROPHILS NFR BLD: 79.6 % (ref 38–73)
NRBC BLD-RTO: 0 /100 WBC
NRBC BLD-RTO: 0 /100 WBC
PHOSPHATE SERPL-MCNC: 3.4 MG/DL (ref 2.7–4.5)
PLATELET # BLD AUTO: 167 K/UL (ref 150–450)
PLATELET # BLD AUTO: 184 K/UL (ref 150–450)
PMV BLD AUTO: 11.5 FL (ref 9.2–12.9)
PMV BLD AUTO: 11.6 FL (ref 9.2–12.9)
POCT GLUCOSE: 82 MG/DL (ref 70–110)
POCT GLUCOSE: 94 MG/DL (ref 70–110)
POCT GLUCOSE: 96 MG/DL (ref 70–110)
POTASSIUM SERPL-SCNC: 4.2 MMOL/L (ref 3.5–5.1)
PROT SERPL-MCNC: 5.8 G/DL (ref 6–8.4)
RBC # BLD AUTO: 2.54 M/UL (ref 4–5.4)
RBC # BLD AUTO: 2.77 M/UL (ref 4–5.4)
SODIUM SERPL-SCNC: 144 MMOL/L (ref 136–145)
WBC # BLD AUTO: 13.92 K/UL (ref 3.9–12.7)
WBC # BLD AUTO: 17.02 K/UL (ref 3.9–12.7)

## 2022-02-16 PROCEDURE — 97535 SELF CARE MNGMENT TRAINING: CPT

## 2022-02-16 PROCEDURE — 97116 GAIT TRAINING THERAPY: CPT

## 2022-02-16 PROCEDURE — 25000003 PHARM REV CODE 250: Performed by: STUDENT IN AN ORGANIZED HEALTH CARE EDUCATION/TRAINING PROGRAM

## 2022-02-16 PROCEDURE — 83735 ASSAY OF MAGNESIUM: CPT | Performed by: STUDENT IN AN ORGANIZED HEALTH CARE EDUCATION/TRAINING PROGRAM

## 2022-02-16 PROCEDURE — 25000003 PHARM REV CODE 250: Performed by: INTERNAL MEDICINE

## 2022-02-16 PROCEDURE — 99233 PR SUBSEQUENT HOSPITAL CARE,LEVL III: ICD-10-PCS | Mod: GC,,, | Performed by: INTERNAL MEDICINE

## 2022-02-16 PROCEDURE — 99233 SBSQ HOSP IP/OBS HIGH 50: CPT | Mod: GC,,, | Performed by: INTERNAL MEDICINE

## 2022-02-16 PROCEDURE — 80053 COMPREHEN METABOLIC PANEL: CPT | Performed by: STUDENT IN AN ORGANIZED HEALTH CARE EDUCATION/TRAINING PROGRAM

## 2022-02-16 PROCEDURE — 94640 AIRWAY INHALATION TREATMENT: CPT

## 2022-02-16 PROCEDURE — 85025 COMPLETE CBC W/AUTO DIFF WBC: CPT | Mod: 91 | Performed by: INTERNAL MEDICINE

## 2022-02-16 PROCEDURE — 99900035 HC TECH TIME PER 15 MIN (STAT)

## 2022-02-16 PROCEDURE — 82553 CREATINE MB FRACTION: CPT

## 2022-02-16 PROCEDURE — 84100 ASSAY OF PHOSPHORUS: CPT | Performed by: STUDENT IN AN ORGANIZED HEALTH CARE EDUCATION/TRAINING PROGRAM

## 2022-02-16 PROCEDURE — 94761 N-INVAS EAR/PLS OXIMETRY MLT: CPT

## 2022-02-16 PROCEDURE — 97530 THERAPEUTIC ACTIVITIES: CPT

## 2022-02-16 PROCEDURE — 27000221 HC OXYGEN, UP TO 24 HOURS

## 2022-02-16 PROCEDURE — 20600001 HC STEP DOWN PRIVATE ROOM

## 2022-02-16 PROCEDURE — 25000003 PHARM REV CODE 250

## 2022-02-16 PROCEDURE — 25000242 PHARM REV CODE 250 ALT 637 W/ HCPCS: Performed by: STUDENT IN AN ORGANIZED HEALTH CARE EDUCATION/TRAINING PROGRAM

## 2022-02-16 RX ORDER — SODIUM CHLORIDE 9 MG/ML
INJECTION, SOLUTION INTRAVENOUS CONTINUOUS
Status: ACTIVE | OUTPATIENT
Start: 2022-02-16 | End: 2022-02-17

## 2022-02-16 RX ADMIN — METOPROLOL SUCCINATE 50 MG: 50 TABLET, EXTENDED RELEASE ORAL at 09:02

## 2022-02-16 RX ADMIN — SODIUM CHLORIDE: 0.9 INJECTION, SOLUTION INTRAVENOUS at 04:02

## 2022-02-16 RX ADMIN — TICAGRELOR 90 MG: 90 TABLET ORAL at 08:02

## 2022-02-16 RX ADMIN — FLUTICASONE FUROATE AND VILANTEROL TRIFENATATE 1 PUFF: 100; 25 POWDER RESPIRATORY (INHALATION) at 08:02

## 2022-02-16 RX ADMIN — TIOTROPIUM BROMIDE INHALATION SPRAY 2 PUFF: 3.12 SPRAY, METERED RESPIRATORY (INHALATION) at 08:02

## 2022-02-16 RX ADMIN — PANTOPRAZOLE SODIUM 40 MG: 40 TABLET, DELAYED RELEASE ORAL at 09:02

## 2022-02-16 RX ADMIN — AMLODIPINE BESYLATE 10 MG: 10 TABLET ORAL at 09:02

## 2022-02-16 RX ADMIN — IPRATROPIUM BROMIDE AND ALBUTEROL SULFATE 3 ML: 2.5; .5 SOLUTION RESPIRATORY (INHALATION) at 07:02

## 2022-02-16 RX ADMIN — ATORVASTATIN CALCIUM 80 MG: 20 TABLET, FILM COATED ORAL at 08:02

## 2022-02-16 RX ADMIN — IPRATROPIUM BROMIDE AND ALBUTEROL SULFATE 3 ML: 2.5; .5 SOLUTION RESPIRATORY (INHALATION) at 01:02

## 2022-02-16 RX ADMIN — TICAGRELOR 90 MG: 90 TABLET ORAL at 09:02

## 2022-02-16 RX ADMIN — ASPIRIN 81 MG: 81 TABLET, COATED ORAL at 09:02

## 2022-02-16 RX ADMIN — POLYETHYLENE GLYCOL 3350 17 G: 17 POWDER, FOR SOLUTION ORAL at 09:02

## 2022-02-16 NOTE — PHYSICIAN QUERY
PT Name: Marisol Kerr  MR #: [id number]    DOCUMENTATION CLARIFICATION     CDS/: Mary Lopez RN, CCDS              Contact information: [email or web address]    This form is a permanent document in the medical record.     Query Date: February 16, 2022    By submitting this query, we are merely seeking further clarification of documentation. Please utilize your independent clinical judgment when addressing the question(s) below.    Supporting Clinical Findings Location in Medical Record    presents to [facility] as a transfer from [facility] for NSTEMI- Continue aspirin, heparin, nitroglycerin gtt, HI statin     NSTEMI-  NTG infusion for chest pain   -Heparin infusion 2/11 consult      2/11 h/p   Patient complained of lower abdominal pain upon getting up from bed to sit up on her commode. Informed Dr. Hermelindo MD ordered to KUB.    CT abd/pelvis with findings of rectus sheath hematoma. MD Kavon notified. Heparin gtt D/C'd, will implement. STAT CBC ordered,     Admitted for NTEMI. CT AP showed left-sided rectus sheath hematoma. IR consulted for embolization 2/14 nurse note        2/14 nurse note        2/14 consult note       Provider, please clarify if there is any clinical correlation between Left Rectus Sheath Hematoma  and  Heparin .           Are the conditions:      [ ZXXX ] Due to or associated with each other   [  ] Unrelated to each other   [  ] Other explanation (Please Specify): ______________   [  ] Clinically Undetermined                                                                               Please document in your progress notes daily for the duration of treatment until resolved and include in your discharge summary.

## 2022-02-16 NOTE — PHYSICIAN QUERY
PT Name: Marisol Kerr  MR #: [id number]    DOCUMENTATION CLARIFICATION      CDS/: Mary Lopez RN, CCDS              Contact information:[email or web address]    This form is a permanent document in the medical record.      Query Date: February 16, 2022    By submitting this query, we are merely seeking further clarification of documentation. Please utilize your independent clinical judgment when addressing the question(s) below.    The Medical Record contains the following:   Indicators  Supporting Clinical Findings Location in Medical Record    Anemia documented     X H&H 11/36.4-->10.6/35.3-->9.9/32.5-->8.9/29.4-->  8.0/26.1-->8.0/25.7-->7.9/26.1 2/11-2/16 lab    BP                    HR      GI bleeding documented     X Acute bleeding (Non GI site) Results of CTA reviewed - enlarging hematoma with continued extravasation.    IR consulted and recommended CTA of the abdomen to identify bleeding vessel 2/14 prog note    Transfusion(s)     X Acute/Chronic illness NSTEMI 2/11 h/p    Treatments     X Other . Hgb went from 13 on admission --> 10.6 --> 9.9 --> 8.9. hematoma was expanding    CBC 2 x daily 2/15 prog note      2/15 md order     Provider, please specify diagnosis or diagnoses associated with above clinical findings.   [   XXX] Acute blood loss anemia    [   ] Anemia, unspecified    [   ] Other Hematological Diagnosis (please specify): _________________   [   ] Clinically Undetermined       Please document in your progress notes daily for the duration of treatment, until resolved, and include in your discharge summary.    Form No. [id number]

## 2022-02-16 NOTE — PLAN OF CARE
CICU DAILY GOALS       A: Awake    RASS: Goal - RASS Goal: 0-->alert and calm  Actual - RASS (Person Agitation-Sedation Scale): 0-->alert and calm   Restraint necessity:    B: Breath   SBT: Not intubated   C: Coordinate A & B, analgesics/sedatives   Pain: managed    SAT: Not intubated  D: Delirium   CAM-ICU: Overall CAM-ICU: Negative  E: Early(intubated/ Progressive (non-intubated) Mobility   MOVE Screen: Pass   Activity: Activity Management: Rolling - L1  FAS: Feeding/Nutrition   Diet order: Diet/Nutrition Received: other (see comments) (cardiac diet), Specialty Diet/Nutrition Received: other (see comments) (cardiac diet) Fluid restriction: Fluid Requirement: 1500cc restriction  T: Thrombus   DVT prophylaxis: VTE Required Core Measure: Pharmacological prophylaxis initiated/maintained  H: HOB Elevation   Head of Bed (HOB) Positioning: HOB elevated  U: Ulcer Prophylaxis   GI: yes  G: Glucose control   managed Glycemic Management: blood glucose monitored  S: Skin   Bundle compliance: yes   Bathing/Skin Care: bath, complete,dressed/undressed,linen changed Date: 2/15/2022  B: Bowel Function   no issues   I: Indwelling Catheters   Montes necessity:     CVC necessity: No   IPAD offered: Not appropriate  D: De-escalation Antibx   No  Plan for the day   Embolization done by IR overnight. H&H remain stable. No acute events throughout shift today. Possible stepdown in AM. Patient and family updated on plan of care.  Family/Goals of care/Code Status   Code Status: Full Code     No acute events throughout day, VS and assessment per flow sheet, patient progressing towards goals as tolerated, plan of care reviewed with Marisol Kerr and family, all concerns addressed, will continue to monitor.

## 2022-02-16 NOTE — PROGRESS NOTES
[facility]  Cardiology  Progress Note    Patient Name: Marisol Kerr  MRN: [id number]  Admission Date: 2/11/2022  Hospital Length of Stay: 5 days  Code Status: Full Code   Attending Physician: Karl Ontiveros MD   Primary Care Physician: Khadar Dwyer MD  Expected Discharge Date: 2/21/2022  Principal Problem:NSTEMI (non-ST elevated myocardial infarction)    Subjective:     Hospital Course:   Patient admitted to CCU while pending evaluation for ACS intervention, given her NSTEMI. Interventional Cardiology and Cardiothoracic Surgery consulted. Initiated guideline directed medical therapy for ACS. Nitroglycerin infusion for chest pain alleviation.  CTS evaluation did not recommend patient for CABG due to her debility as she is prohibitively high risk for CABG. Given contrast allergy, Interventional Cards started on prednisone 50 mg, famotidine 20, diphenhydramine 50 in anticipation of LHC. Patient had significant abdominal pain overnight and distended abdomen with mass. In light of this, IC deferred LHC. CT of the abdomen showed a large rectus sheath hematoma. Given patient's kidney function, CTA was deferred temporarily but the abdominal mass continued to enlarge. CTA of the abdomen showed extravasation of contrast into the rectus sheath hematoma. IR consulted and performed embolization of the inferior epigastric and collateral arteries. Patient's Cr elevated to 1.9 likely due to contrast. Started patient on slow NS infusion and will defer IC intervention until Cr returns to baseline or stabilizes      Interval History: No acute events overnight.     Review of Systems   Constitutional: Negative for decreased appetite, fever, weight gain and weight loss.   HENT: Negative for congestion and sore throat.    Eyes: Negative for blurred vision, vision loss in left eye, vision loss in right eye and visual disturbance.   Cardiovascular: Negative for chest pain, leg swelling (at baseline) and  palpitations.   Respiratory: Negative for cough and shortness of breath.    Skin: Negative for flushing and rash.   Gastrointestinal: Positive for constipation. Negative for abdominal pain, diarrhea, heartburn, nausea and vomiting.   Genitourinary: Negative for dysuria and flank pain.   Neurological: Negative for focal weakness, headaches, light-headedness and weakness.   Psychiatric/Behavioral: Negative for altered mental status and substance abuse. The patient is not nervous/anxious.    Allergic/Immunologic: Negative for environmental allergies and hives.     Objective:     Vital Signs (Most Recent):  Temp: 98.3 °F (36.8 °C) (02/16/22 1100)  Pulse: 75 (02/16/22 1314)  Resp: 14 (02/16/22 1314)  BP: (!) 122/53 (02/16/22 1300)  SpO2: 100 % (02/16/22 1314) Vital Signs (24h Range):  Temp:  [98.2 °F (36.8 °C)-99 °F (37.2 °C)] 98.3 °F (36.8 °C)  Pulse:  [60-88] 75  Resp:  [12-42] 14  SpO2:  [92 %-100 %] 100 %  BP: (108-160)/(49-65) 122/53     Weight: 89.4 kg (197 lb)  Body mass index is 34.9 kg/m².     SpO2: 100 %  O2 Device (Oxygen Therapy): nasal cannula      Intake/Output Summary (Last 24 hours) at 2/16/2022 1354  Last data filed at 2/16/2022 1000  Gross per 24 hour   Intake 240 ml   Output 1500 ml   Net -1260 ml       Lines/Drains/Airways     Drain            Female External Urinary Catheter 02/12/22 0700 4 days          Peripheral Intravenous Line                 Peripheral IV - Single Lumen 02/14/22 1500 18 G Left Breast 1 day         Peripheral IV - Single Lumen 02/14/22 1500 18 G Right Breast 1 day                Physical Exam  Vitals and nursing note reviewed.   Constitutional:       General: She is not in acute distress.     Appearance: Normal appearance. She is obese. She is not ill-appearing, toxic-appearing or diaphoretic.   HENT:      Head: Normocephalic and atraumatic.      Right Ear: External ear normal.      Left Ear: External ear normal.   Eyes:      General: No scleral icterus.        Right eye: No  discharge.         Left eye: No discharge.      Extraocular Movements: Extraocular movements intact.      Conjunctiva/sclera: Conjunctivae normal.   Cardiovascular:      Rate and Rhythm: Normal rate and regular rhythm.      Pulses: Normal pulses.      Heart sounds: Normal heart sounds.   Pulmonary:      Effort: Pulmonary effort is normal. No respiratory distress.      Breath sounds: No wheezing or rales.   Chest:      Chest wall: No tenderness.   Abdominal:      General: There is no distension.      Palpations: There is mass.      Tenderness: There is no abdominal tenderness. There is no guarding.   Musculoskeletal:         General: No swelling or tenderness. Normal range of motion.      Cervical back: Normal range of motion and neck supple.      Right lower leg: Edema present.      Left lower leg: Edema present.   Skin:     General: Skin is warm and dry.      Capillary Refill: Capillary refill takes less than 2 seconds.      Findings: Erythema (lower extermity venous stasis) present.   Neurological:      General: No focal deficit present.      Mental Status: She is alert. Mental status is at baseline.   Psychiatric:         Mood and Affect: Mood normal.         Behavior: Behavior normal.         Significant Labs:   CMP   Recent Labs   Lab 02/15/22  0354 02/16/22  0516    144   K 4.7 4.2    107   CO2 25 27   * 87   BUN 50* 59*   CREATININE 1.7* 1.9*   CALCIUM 9.0 9.0   PROT 5.6* 5.8*   ALBUMIN 3.0* 3.1*   BILITOT 0.5 0.5   ALKPHOS 51* 55   AST 20 28   ALT 20 27   ANIONGAP 10 10   ESTGFRAFRICA 33.8* 29.5*   EGFRNONAA 29.3* 25.6*   , CBC   Recent Labs   Lab 02/15/22  0354 02/15/22  0354 02/15/22  1618 02/15/22  1618 02/16/22  0516   WBC 15.66*  --  15.41*  --  17.02*   HGB 8.3*  --  8.0*  --  7.9*   HCT 27.7*   < > 25.7*   < > 26.1*     --  188  --  184    < > = values in this interval not displayed.    and All pertinent lab results from the last 24 hours have been reviewed.    Significant  Imaging: Echocardiogram:   Transthoracic echo (TTE) complete (Cupid Only):   Results for orders placed or performed during the hospital encounter of 02/11/22   Echo   Result Value Ref Range    Ascending aorta 2.24 cm    STJ 2.11 cm    AV mean gradient 8 mmHg    Ao peak asim 1.80 m/s    Ao VTI 36.43 cm    IVRT 91.34 msec    IVS 0.86 0.6 - 1.1 cm    LA size 4.33 cm    Left Atrium Major Axis 5.64 cm    Left Atrium Minor Axis 5.64 cm    LVIDd 4.74 3.5 - 6.0 cm    LVIDs 2.64 2.1 - 4.0 cm    LVOT diameter 2.00 cm    LVOT peak VTI 27.39 cm    Posterior Wall 0.89 0.6 - 1.1 cm    MV Peak A Asim 1.03 m/s    E wave deceleration time 194.92 msec    MV Peak E Asim 0.94 m/s    RA Major Axis 4.52 cm    RA Width 3.45 cm    RVDD 2.85 cm    Sinus 2.57 cm    TAPSE 3.70 cm    TDI LATERAL 0.05 m/s    TDI SEPTAL 0.05 m/s    LA WIDTH 4.64 cm    MV stenosis pressure 1/2 time 56.53 ms    LV Diastolic Volume 104.66 mL    LV Systolic Volume 25.44 mL    RV S' 20.60 cm/s    LVOT peak asim 1.21 m/s    LA volume (mod) 78.65 cm3    MV mean gradient 1 mmHg    MV peak gradient 5 mmHg    MV VTI 28.95 cm    LV LATERAL E/E' RATIO 18.80 m/s    LV SEPTAL E/E' RATIO 18.80 m/s    FS 44 %    LA volume 96.32 cm3    LV mass 139.43 g    Left Ventricle Relative Wall Thickness 0.38 cm    AV valve area 2.36 cm2    AV Velocity Ratio 0.67     AV index (prosthetic) 0.75     MV valve area p 1/2 method 3.89 cm2    MV valve area by continuity eq 2.97 cm2    E/A ratio 0.91     Mean e' 0.05 m/s    LVOT area 3.1 cm2    LVOT stroke volume 86.00 cm3    AV peak gradient 13 mmHg    E/E' ratio 18.80 m/s    LV Systolic Volume Index 13.3 mL/m2    LV Diastolic Volume Index 54.51 mL/m2    LA Volume Index 50.2 mL/m2    LV Mass Index 73 g/m2    LA Volume Index (Mod) 41.0 mL/m2    BSA 1.99 m2    Right Atrial Pressure (from IVC) 3 mmHg    EF 65 %    Narrative    · The left ventricle is normal in size with normal systolic function.  · The estimated ejection fraction is 65%.  · There is a  "minor septal wall motion abnormality.  · Severe left atrial enlargement.  · Grade II left ventricular diastolic dysfunction.  · Normal right ventricular size with normal right ventricular systolic   function.  · Normal central venous pressure (3 mmHg).        Assessment and Plan:       * NSTEMI (non-ST elevated myocardial infarction)  -Recent hx of increasing episodes of angina requiring more frequent Nitroglycerin use and underwent LHC at OSH.   -At OSH, Episode of 10/10 chest pain all over that she described as "burning", and "veins being ripped apart". Stat EKG at the time showed concerns for  lateral precordial ST depressions. She was initiated on nitroglycerin drip, heparin drip. One dose of  morphine 2mg & Lopressor 5mg IV were also administered. She reported alleviation of her chest pain after administration. Stable on Nitroglycerin drip. She was transferred to our hospital for further evaluation of her ACS and potential emergent intervention. LHC at outside hospital was notable for significant coronary artery disease:  Recent Left heart cath[02/11/22] showed:  · The RPAV lesion was 100% stenosed.  · The RPDA lesion was 100% stenosed.  · The Prox Cx to Mid Cx lesion was 80% stenosed.  · The Dist Cx lesion was 70% stenosed.  · The 1st LPL lesion was 90% stenosed.  · The Prox RCA-2 lesion was 70% stenosed.  · The Prox RCA-1 lesion was 90% stenosed.  · The Mid LAD-2 lesion was 60% stenosed.  · The estimated blood loss was <50 mL.  · There was three vessel coronary artery disease.    -ANUSHKA score: 6 points, 41% risk at 14 days of: all-cause mortality, new or recurrent MI, or severe recurrent ischemia requiring urgent revascularization.  -SYLVESTER Score: 136 points, 13 % probability of death from admission to 6 months      Plan:   - Continuous Telemetry monitoring  - continue 81 mg ASA qd  - Atorvastatin 80mg qhs  - Metoprolol 50 mg XL  - continue Ticagrelor 90mg bid  - patient has contrast allergy, will start patient " on prednisone 50 mg, famotidine 20 mg, diphenhydramine 50 when planned for LHC.   - LHC deferred given rectus sheath hematoma. Will touch base with IC and start heparin gtt when Cr stabilizes          Acute on chronic diastolic congestive heart failure  CXR nonconcerning for pleural effusion/pulmonary edema, but concerns for CHF, given patient had recent increasing SOB, BNP elevation(580), and NSTEMI. TTE notable for Grade II left ventricular diastolic dysfunction.    Transthoracic echo (TTE) complete[02/12/22]  Summary  · The left ventricle is normal in size with normal systolic function.  · The estimated ejection fraction is 65%.  · There is a minor septal wall motion abnormality.  · Severe left atrial enlargement.  · Grade II left ventricular diastolic dysfunction.  · Normal right ventricular size with normal right ventricular systolic function.  · Normal central venous pressure (3 mmHg).      Plan:  - Cardiac diet with Fluid restriction at 1.5L with strict I/Os and daily STANDING weights  - Maintain on telemetry  - Check Electrolytes, keep Mag >2 & K+ >4  - Ambulate as tolerated . PT/OT consulted    CKD (chronic kidney disease), stage III  Baseline creatinine 1.5- 1.8   Creatinine uptrended from admission, but stable for now.   Recent Labs     02/14/22  0551 02/15/22  0354 02/16/22  0516   CREATININE 1.7* 1.7* 1.9*   BUN 52* 50* 59*     Estimated Creatinine Clearance: 27.6 mL/min (A) (based on SCr of 1.9 mg/dL (H)). according to latest data. Cr elevated to 1.9 likely due to SHIRA. Will hold off on LHC until Cr stabilizes.     PLAN  Start 75cc/hour NS gtt for 10 hours  Monitor UOP and serial BMP and adjust therapy as needed.   Avoid nephrotoxic meds  Renally dose meds.        Gastroesophageal reflux disease without esophagitis  Stable.    PLAN  famotidine tablet 20 mg    DM type 2, controlled, with complication  Last A1c:   Lab Results   Component Value Date    HGBA1C 5.2 01/27/2022      Blood Sugars  "(AccuCheck):    Recent Labs     02/12/22  1209 02/12/22  1727 02/12/22  2037 02/13/22  0748   POCTGLUCOSE 158* 158* 183* 155*         PLAN  -Low dose SSI  - Diabetic diet  - POCT glucose ACHS  - Will monitor glucose results and adjust insulin regimen accordingly      Essential hypertension, benign  BP was hypertensive at OSH. Stable on admission after initiation of home anti-hypertensives and  nitroglycerin drip .    Home medications:  amLODIPine (NORVASC) 5 MG, benazepriL (LOTENSIN) 40 MG ,     PLAN  metoprolol succinate (TOPROL-XL) 24 hr tablet 50 mg    amLODIPine tablet 10 mg    Hypercholesterolemia  Lab Results   Component Value Date    HDL 53 09/21/2021    LDLCALC 98.2 09/21/2021    CHOL 165 09/21/2021    TRIG 69 09/21/2021     PLAN  Atorvastatin 40mg    Coronary artery disease, multivessel with history of previous PCI  See "NSTEMI (non-ST elevated myocardial infarction)" A&P      Coronary atherosclerosis  See "NSTEMI (non-ST elevated myocardial infarction)" A&P    Rectus sheath hematoma  Patient had severe abdominal pain the day before with a mass on the abdomen. Patient was unable to have a BM yesterday. KUB was drawn and showed a large stool burden. Continued bowel regimen but pain was persistent. CT abdomen without contrast and lactic drawn. CT abdomen showed rectal sheath hematoma extending to the pelvis. IR consulted and recommended CTA of the abdomen to identify bleeding vessel. Due to kidney function, held off on CTA for concerns of contrast induced nephropathy as patient had a Alfredo score of 16 with 100cc of contrast, 15 with 75 cc contrast. IR consulted and following. Hgb went from 13 on admission --> 10.6 --> 9.9 --> 8.9. hematoma was expanding. IR performed embolization of inferior epigastric and collateral vessels.     Plan:  - continue to monitor abdominal mass    Venous stasis  Chronic history of venous stasis of bilateral lower extremities limited to breakdown of skin without varicose " veins  Patient denies increase in leg swelling over her baseline.    PLAN  Nursing wound care PRN  Elevate legs    COPD/emphysema  Known history of severe COPD (GOLD IV D PFT 2020). On home oxygen (2-3L)    Home medications: albuterol (VENTOLIN HFA) 90 mcg/actuation inhaler, ipratropium-albuteroL (COMBIVENT RESPIMAT)  mcg/actuation inhaler,  umeclidinium (INCRUSE ELLIPTA) 62.5 mcg/actuation inhalation capsule, fluticasone-salmeterol diskus inhaler 250-50 mcg      PLAN  VBG PRN  Supplementary oxygen via NC with titration to maintain  O2 sats >90%  Duonebs q6h while awake  Tiotropium inhaler            VTE Risk Mitigation (From admission, onward)         Ordered     Reason for No Pharmacological VTE Prophylaxis  Once        Question:  Reasons:  Answer:  Physician Provided (leave comment)    02/11/22 1446     IP VTE HIGH RISK PATIENT  Once         02/11/22 1446     Place sequential compression device  Until discontinued         02/11/22 1446                Becca Jacobson MD  Cardiology  [facility]

## 2022-02-16 NOTE — ASSESSMENT & PLAN NOTE
Baseline creatinine 1.5- 1.8   Creatinine uptrended from admission, but stable for now.   Recent Labs     02/14/22  0551 02/15/22  0354 02/16/22  0516   CREATININE 1.7* 1.7* 1.9*   BUN 52* 50* 59*     Estimated Creatinine Clearance: 27.6 mL/min (A) (based on SCr of 1.9 mg/dL (H)). according to latest data. Cr elevated to 1.9 likely due to SHIRA. Will hold off on LHC until Cr stabilizes.     PLAN  Start 75cc/hour NS gtt for 10 hours  Monitor UOP and serial BMP and adjust therapy as needed.   Avoid nephrotoxic meds  Renally dose meds.

## 2022-02-16 NOTE — ASSESSMENT & PLAN NOTE
"-Recent hx of increasing episodes of angina requiring more frequent Nitroglycerin use and underwent LHC at OSH.   -At OSH, Episode of 10/10 chest pain all over that she described as "burning", and "veins being ripped apart". Stat EKG at the time showed concerns for  lateral precordial ST depressions. She was initiated on nitroglycerin drip, heparin drip. One dose of  morphine 2mg & Lopressor 5mg IV were also administered. She reported alleviation of her chest pain after administration. Stable on Nitroglycerin drip. She was transferred to our hospital for further evaluation of her ACS and potential emergent intervention. LHC at outside hospital was notable for significant coronary artery disease:  Recent Left heart cath[02/11/22] showed:  · The RPAV lesion was 100% stenosed.  · The RPDA lesion was 100% stenosed.  · The Prox Cx to Mid Cx lesion was 80% stenosed.  · The Dist Cx lesion was 70% stenosed.  · The 1st LPL lesion was 90% stenosed.  · The Prox RCA-2 lesion was 70% stenosed.  · The Prox RCA-1 lesion was 90% stenosed.  · The Mid LAD-2 lesion was 60% stenosed.  · The estimated blood loss was <50 mL.  · There was three vessel coronary artery disease.    -ANUSHKA score: 6 points, 41% risk at 14 days of: all-cause mortality, new or recurrent MI, or severe recurrent ischemia requiring urgent revascularization.  -SYLVESTER Score: 136 points, 13 % probability of death from admission to 6 months      Plan:   - Continuous Telemetry monitoring  - continue 81 mg ASA qd  - Atorvastatin 80mg qhs  - Metoprolol 50 mg XL  - continue Ticagrelor 90mg bid  - patient has contrast allergy, will start patient on prednisone 50 mg, famotidine 20 mg, diphenhydramine 50 when planned for LHC.   - LHC deferred given rectus sheath hematoma. Will touch base with IC and start heparin gtt when Cr stabilizes        "

## 2022-02-16 NOTE — PT/OT/SLP PROGRESS
"Occupational Therapy   Co-Treatment    Name: Marisol Kerr  MRN: [id number]  Admitting Diagnosis:  NSTEMI (non-ST elevated myocardial infarction)       Recommendations:     Discharge Recommendations: nursing facility, skilled    Assessment:     Marisol Kerr is a 76 y.o. female with a medical diagnosis of NSTEMI (non-ST elevated myocardial infarction). Performance deficits affecting function are weakness,impaired endurance,impaired self care skills,impaired functional mobilty,gait instability,impaired balance,pain. Pt tolerated session well with good effort and performance. Increased WOB and SOB during activity with saturation 88-92%. Pt to benefit from cont OT and may benefit from post acute therapy prior to return home.     Rehab Prognosis:  Good; patient would benefit from acute skilled OT services to address these deficits and reach maximum level of function.       Plan:     Patient to be seen 4 x/week to address the above listed problems via self-care/home management,therapeutic activities,therapeutic exercises  · Plan of Care Expires: 03/14/22  · Plan of Care Reviewed with: patient    Subjective   Pt agreeable to therapy.   "I just got comfortable" pt states.   "I don't want to go to a nursing home" pt states.     Pain/Comfort:  · Pain Rating 1: 8/10  · Location - Side 1: Bilateral  · Location 1: arm  · Pain Addressed 1: Distraction,Reposition    Objective:     Communicated with: nsg prior to session.  Patient found supine in bed on 2 LPM, tele, pulse ox and BP cuff.  Co tx completed this date to optimize functional performance and safety given impaired tolerance for activity in setting of ICU     General Precautions: Standard, fall     Occupational Performance:     Bed Mobility:    Supine>sit with SBA     Functional Mobility/Transfers:  · Sit>stand with CGA from bed and chair. RW used with cues for hand placement.     Activities of Daily Living:  · Feeding: set-up  · G/H: standing with CGA  · LE dressing: TOTAL A "     [facility] 6 Click ADL: 15    Treatment & Education:  Pt demo Fair+ sitting balance and able to stand from bed with CGA. Pt completed standing oral care with RW with CGA with cues for walker placement. Pt needing cues for breathing to avoid short, shallow breathing. Pt with one brief episode of feeling lightheaded, but BP stable.   After seated rest break on EOB following g/h, pt requested to t/f to chair.    Pt required CGA with RW to t/f to chair.     Pt did have 2 brief episodes of c/o of chest pain during activity. During these c/o, pt with increased WOB with activity. These c/o quickly resolved with stable vital signs with nsg notified.     Education provided re: OT POC and safety with functional mobility/ADL skills. Pt needing cues for breathing technique throughout session including to avoid talking during activity. Pt very talkative and needing cues for attention to task at hand.           Patient left up in chair with all lines intact, call button in reach and nsg notifiedEducation:      GOALS:   Multidisciplinary Problems     Occupational Therapy Goals        Problem: Occupational Therapy Goal    Goal Priority Disciplines Outcome Interventions   Occupational Therapy Goal     OT, PT/OT Ongoing, Progressing    Description: Goals to be met by: 2/26/2022      Patient will increase functional independence with ADLs by performing:    LE Dressing using AE PRN with Stand-by Assistance.  Grooming while standing with Stand-by Assistance.  Toileting from toilet with Stand-by Assistance for hygiene and clothing management.   Supine to sit with Stand-by Assistance.  Step transfer with Stand-by Assistance                     Time Tracking:     OT Date of Treatment: 02/16/22  OT Start Time: 0822  OT Stop Time: 0854  OT Total Time (min): 32 min    Billable Minutes:Self Care/Home Management 16  Therapeutic Activity 16    OT/LONA: OT     LONA Visit Number: 0    2/16/2022

## 2022-02-16 NOTE — PT/OT/SLP PROGRESS
Physical Therapy Treatment    Patient Name:  Marisol Kerr   MRN:  [id number]  Admit Date: 2022  Admitting Diagnosis:  NSTEMI (non-ST elevated myocardial infarction)   Length of Stay: 5 days  Recent Surgery: Procedure(s) (LRB):  Left heart cath (Left)      Recommendations:     Discharge Recommendations:  nursing facility, skilled   Discharge Equipment Recommendations: other (see comments) (tbd)   Barriers to discharge: None    Plan:     During this hospitalization, patient to be seen 4 x/week to address the listed problems via gait training,therapeutic activities,therapeutic exercises,neuromuscular re-education  · Plan of Care Expires:  22   Plan of Care Reviewed with: patient    Assessment:     Marisol Kerr is a 76 y.o. female admitted with a medical diagnosis of NSTEMI (non-ST elevated myocardial infarction).  Pt found alert and cooperative. Pt medically stable per RN. Pt with dizziness, SOB and chest pain with ambulation. Pt able to ambulate a total of 32 ft today around room requiring 1 seated rest break d/t decreased O2 sat to 80s. POC next session will be to work on pacing with ambulation.     Problem List: weakness,impaired endurance,gait instability,impaired balance,impaired cardiopulmonary response to activity,impaired self care skills,impaired functional mobilty,pain.  Rehab Prognosis: Good     GOALS:   Multidisciplinary Problems     Physical Therapy Goals        Problem: Physical Therapy Goal    Goal Priority Disciplines Outcome Goal Variances Interventions   Physical Therapy Goal     PT, PT/OT Ongoing, Progressing     Description: Goals to be met by:      Patient will increase functional independence with mobility by performin. Supine to sit with Modified Santa  2. Sit to supine with Modified Santa  3. Sit to stand transfer with Modified Santa  4. Gait  x 50 feet with Modified Santa using LRAD.   5. Ascend/Descend 6 inch curb step with Contact Guard  "Assistance using LRAD.                     Subjective   Communicated with RN prior to session.  Patient found HOB elevated upon PT entry to room, agreeable to evaluation. Marisol Kerr's alone during session.    Chief Complaint: B arm pain  Patient/Family Comments/goals: return home  Pain/Comfort:  · Pain Rating 1: 8/10  · Location - Side 1: Bilateral  · Location 1: arm  · Pain Addressed 1: Reposition,Distraction  · Pain Rating Post-Intervention 1: 8/10  · Location - Side 2: Bilateral  · Location 2: arm    Objective:   Patient found with: blood pressure cuff,pulse ox (continuous),telemetry,peripheral IV,PureWick,oxygen   General Precautions: Standard, Cardiac fall   Orthopedic Precautions:N/A   Braces: N/A   Oxygen Device: Nasal Cannula  Vitals: BP (!) 126/54   Pulse 73   Temp 98.3 °F (36.8 °C) (Oral)   Resp 20   Ht 5' 3" (1.6 m)   Wt 89.4 kg (197 lb)   SpO2 100%   BMI 34.90 kg/m²     Outcome Measures:  AM-PAC 6 CLICK MOBILITY  Turning over in bed (including adjusting bedclothes, sheets and blankets)?: 3  Sitting down on and standing up from a chair with arms (e.g., wheelchair, bedside commode, etc.): 3  Moving from lying on back to sitting on the side of the bed?: 3  Moving to and from a bed to a chair (including a wheelchair)?: 3  Need to walk in hospital room?: 3  Climbing 3-5 steps with a railing?: 2  Basic Mobility Total Score: 17     Functional Mobility:  Additional staff present: OT - multiple skilled therapists required for line management and v/s monitoring  Bed Mobility:    Rolling/Turning to Right: stand by assistance   Supine to Sit: stand by assistance; HOB elevated    Transfers:    Sit <> Stand Transfer: contact guard assistance with rolling walker from EOB x2 trials and chair x1 trial      Gait:  · Patient ambulated: 8 ft to sink + 8 ft to EOB + 16 ft back to chair  · performed standing ADLs with OT  · Patient required: contact guard  · Patient used: rolling walker  · Gait Pattern observed: " reciprocal gait  · Gait Deviation(s): occasional unsteady gait and decreased step length  · Impairments due to: impaired balance and decreased endurance  · Comments: Pt with drop in O2 to the 80s with standing ADLs and ambulation. SpO2 increased to >90 with seated rest. Pt with c/o of dizziness and lightheadedness. Pt with c/o of chest pain during ambulation and RN notified. Pt required 1 sitting rest break EOB and given v/c for deep breathing exercises. Pt able to recover with deep breathing and sitting rest.      Therapeutic Activities, Exercises, and Education:   Pt educated on role of PT/POC  Pt educated on importance of OOB activity and daily ambulation      Patient left up in chair with all lines intact, call button in reach and RN notified..    Time Tracking:     PT Received On: 02/16/22  PT Start Time: 0820     PT Stop Time: 0857  PT Total Time (min): 37 min     Billable Minutes:   · Gait Training 23    Treatment Type: Treatment  PT/PTA: PT

## 2022-02-16 NOTE — SUBJECTIVE & OBJECTIVE
Interval History: No acute events overnight.     Review of Systems   Constitutional: Negative for decreased appetite, fever, weight gain and weight loss.   HENT: Negative for congestion and sore throat.    Eyes: Negative for blurred vision, vision loss in left eye, vision loss in right eye and visual disturbance.   Cardiovascular: Negative for chest pain, leg swelling (at baseline) and palpitations.   Respiratory: Negative for cough and shortness of breath.    Skin: Negative for flushing and rash.   Gastrointestinal: Positive for constipation. Negative for abdominal pain, diarrhea, heartburn, nausea and vomiting.   Genitourinary: Negative for dysuria and flank pain.   Neurological: Negative for focal weakness, headaches, light-headedness and weakness.   Psychiatric/Behavioral: Negative for altered mental status and substance abuse. The patient is not nervous/anxious.    Allergic/Immunologic: Negative for environmental allergies and hives.     Objective:     Vital Signs (Most Recent):  Temp: 98.3 °F (36.8 °C) (02/16/22 1100)  Pulse: 75 (02/16/22 1314)  Resp: 14 (02/16/22 1314)  BP: (!) 122/53 (02/16/22 1300)  SpO2: 100 % (02/16/22 1314) Vital Signs (24h Range):  Temp:  [98.2 °F (36.8 °C)-99 °F (37.2 °C)] 98.3 °F (36.8 °C)  Pulse:  [60-88] 75  Resp:  [12-42] 14  SpO2:  [92 %-100 %] 100 %  BP: (108-160)/(49-65) 122/53     Weight: 89.4 kg (197 lb)  Body mass index is 34.9 kg/m².     SpO2: 100 %  O2 Device (Oxygen Therapy): nasal cannula      Intake/Output Summary (Last 24 hours) at 2/16/2022 1354  Last data filed at 2/16/2022 1000  Gross per 24 hour   Intake 240 ml   Output 1500 ml   Net -1260 ml       Lines/Drains/Airways     Drain            Female External Urinary Catheter 02/12/22 0700 4 days          Peripheral Intravenous Line                 Peripheral IV - Single Lumen 02/14/22 1500 18 G Left Breast 1 day         Peripheral IV - Single Lumen 02/14/22 1500 18 G Right Breast 1 day                Physical  Exam  Vitals and nursing note reviewed.   Constitutional:       General: She is not in acute distress.     Appearance: Normal appearance. She is obese. She is not ill-appearing, toxic-appearing or diaphoretic.   HENT:      Head: Normocephalic and atraumatic.      Right Ear: External ear normal.      Left Ear: External ear normal.   Eyes:      General: No scleral icterus.        Right eye: No discharge.         Left eye: No discharge.      Extraocular Movements: Extraocular movements intact.      Conjunctiva/sclera: Conjunctivae normal.   Cardiovascular:      Rate and Rhythm: Normal rate and regular rhythm.      Pulses: Normal pulses.      Heart sounds: Normal heart sounds.   Pulmonary:      Effort: Pulmonary effort is normal. No respiratory distress.      Breath sounds: No wheezing or rales.   Chest:      Chest wall: No tenderness.   Abdominal:      General: There is no distension.      Palpations: There is mass.      Tenderness: There is no abdominal tenderness. There is no guarding.   Musculoskeletal:         General: No swelling or tenderness. Normal range of motion.      Cervical back: Normal range of motion and neck supple.      Right lower leg: Edema present.      Left lower leg: Edema present.   Skin:     General: Skin is warm and dry.      Capillary Refill: Capillary refill takes less than 2 seconds.      Findings: Erythema (lower extermity venous stasis) present.   Neurological:      General: No focal deficit present.      Mental Status: She is alert. Mental status is at baseline.   Psychiatric:         Mood and Affect: Mood normal.         Behavior: Behavior normal.         Significant Labs:   CMP   Recent Labs   Lab 02/15/22  0354 02/16/22  0516    144   K 4.7 4.2    107   CO2 25 27   * 87   BUN 50* 59*   CREATININE 1.7* 1.9*   CALCIUM 9.0 9.0   PROT 5.6* 5.8*   ALBUMIN 3.0* 3.1*   BILITOT 0.5 0.5   ALKPHOS 51* 55   AST 20 28   ALT 20 27   ANIONGAP 10 10   ESTGFRAFRICA 33.8* 29.5*    EGFRNONAA 29.3* 25.6*   , CBC   Recent Labs   Lab 02/15/22  0354 02/15/22  0354 02/15/22  1618 02/15/22  1618 02/16/22  0516   WBC 15.66*  --  15.41*  --  17.02*   HGB 8.3*  --  8.0*  --  7.9*   HCT 27.7*   < > 25.7*   < > 26.1*     --  188  --  184    < > = values in this interval not displayed.    and All pertinent lab results from the last 24 hours have been reviewed.    Significant Imaging: Echocardiogram:   Transthoracic echo (TTE) complete (Cupid Only):   Results for orders placed or performed during the hospital encounter of 02/11/22   Echo   Result Value Ref Range    Ascending aorta 2.24 cm    STJ 2.11 cm    AV mean gradient 8 mmHg    Ao peak asim 1.80 m/s    Ao VTI 36.43 cm    IVRT 91.34 msec    IVS 0.86 0.6 - 1.1 cm    LA size 4.33 cm    Left Atrium Major Axis 5.64 cm    Left Atrium Minor Axis 5.64 cm    LVIDd 4.74 3.5 - 6.0 cm    LVIDs 2.64 2.1 - 4.0 cm    LVOT diameter 2.00 cm    LVOT peak VTI 27.39 cm    Posterior Wall 0.89 0.6 - 1.1 cm    MV Peak A Asim 1.03 m/s    E wave deceleration time 194.92 msec    MV Peak E Asim 0.94 m/s    RA Major Axis 4.52 cm    RA Width 3.45 cm    RVDD 2.85 cm    Sinus 2.57 cm    TAPSE 3.70 cm    TDI LATERAL 0.05 m/s    TDI SEPTAL 0.05 m/s    LA WIDTH 4.64 cm    MV stenosis pressure 1/2 time 56.53 ms    LV Diastolic Volume 104.66 mL    LV Systolic Volume 25.44 mL    RV S' 20.60 cm/s    LVOT peak asim 1.21 m/s    LA volume (mod) 78.65 cm3    MV mean gradient 1 mmHg    MV peak gradient 5 mmHg    MV VTI 28.95 cm    LV LATERAL E/E' RATIO 18.80 m/s    LV SEPTAL E/E' RATIO 18.80 m/s    FS 44 %    LA volume 96.32 cm3    LV mass 139.43 g    Left Ventricle Relative Wall Thickness 0.38 cm    AV valve area 2.36 cm2    AV Velocity Ratio 0.67     AV index (prosthetic) 0.75     MV valve area p 1/2 method 3.89 cm2    MV valve area by continuity eq 2.97 cm2    E/A ratio 0.91     Mean e' 0.05 m/s    LVOT area 3.1 cm2    LVOT stroke volume 86.00 cm3    AV peak gradient 13 mmHg    E/E'  ratio 18.80 m/s    LV Systolic Volume Index 13.3 mL/m2    LV Diastolic Volume Index 54.51 mL/m2    LA Volume Index 50.2 mL/m2    LV Mass Index 73 g/m2    LA Volume Index (Mod) 41.0 mL/m2    BSA 1.99 m2    Right Atrial Pressure (from IVC) 3 mmHg    EF 65 %    Narrative    · The left ventricle is normal in size with normal systolic function.  · The estimated ejection fraction is 65%.  · There is a minor septal wall motion abnormality.  · Severe left atrial enlargement.  · Grade II left ventricular diastolic dysfunction.  · Normal right ventricular size with normal right ventricular systolic   function.  · Normal central venous pressure (3 mmHg).

## 2022-02-17 LAB
ABO + RH BLD: NORMAL
ALBUMIN SERPL BCP-MCNC: 2.7 G/DL (ref 3.5–5.2)
ALP SERPL-CCNC: 55 U/L (ref 55–135)
ALT SERPL W/O P-5'-P-CCNC: 26 U/L (ref 10–44)
ANION GAP SERPL CALC-SCNC: 8 MMOL/L (ref 8–16)
AST SERPL-CCNC: 33 U/L (ref 10–40)
BASOPHILS # BLD AUTO: 0.01 K/UL (ref 0–0.2)
BASOPHILS # BLD AUTO: 0.01 K/UL (ref 0–0.2)
BASOPHILS # BLD AUTO: 0.02 K/UL (ref 0–0.2)
BASOPHILS NFR BLD: 0.1 % (ref 0–1.9)
BASOPHILS NFR BLD: 0.1 % (ref 0–1.9)
BASOPHILS NFR BLD: 0.2 % (ref 0–1.9)
BILIRUB SERPL-MCNC: 0.8 MG/DL (ref 0.1–1)
BLD GP AB SCN CELLS X3 SERPL QL: NORMAL
BUN SERPL-MCNC: 50 MG/DL (ref 8–23)
CALCIUM SERPL-MCNC: 8.4 MG/DL (ref 8.7–10.5)
CHLORIDE SERPL-SCNC: 108 MMOL/L (ref 95–110)
CK MB SERPL-MCNC: 1.4 NG/ML (ref 0.1–6.5)
CK MB SERPL-RTO: 0.2 % (ref 0–5)
CK SERPL-CCNC: 564 U/L (ref 20–180)
CO2 SERPL-SCNC: 26 MMOL/L (ref 23–29)
CREAT SERPL-MCNC: 1.4 MG/DL (ref 0.5–1.4)
DIFFERENTIAL METHOD: ABNORMAL
EOSINOPHIL # BLD AUTO: 0.1 K/UL (ref 0–0.5)
EOSINOPHIL # BLD AUTO: 0.2 K/UL (ref 0–0.5)
EOSINOPHIL # BLD AUTO: 0.2 K/UL (ref 0–0.5)
EOSINOPHIL NFR BLD: 0.9 % (ref 0–8)
EOSINOPHIL NFR BLD: 1.3 % (ref 0–8)
EOSINOPHIL NFR BLD: 1.6 % (ref 0–8)
ERYTHROCYTE [DISTWIDTH] IN BLOOD BY AUTOMATED COUNT: 13.3 % (ref 11.5–14.5)
ERYTHROCYTE [DISTWIDTH] IN BLOOD BY AUTOMATED COUNT: 13.3 % (ref 11.5–14.5)
ERYTHROCYTE [DISTWIDTH] IN BLOOD BY AUTOMATED COUNT: 13.4 % (ref 11.5–14.5)
EST. GFR  (AFRICAN AMERICAN): 42.7 ML/MIN/1.73 M^2
EST. GFR  (NON AFRICAN AMERICAN): 37 ML/MIN/1.73 M^2
GLUCOSE SERPL-MCNC: 74 MG/DL (ref 70–110)
HCT VFR BLD AUTO: 23.9 % (ref 37–48.5)
HCT VFR BLD AUTO: 24.4 % (ref 37–48.5)
HCT VFR BLD AUTO: 25.8 % (ref 37–48.5)
HGB BLD-MCNC: 7.2 G/DL (ref 12–16)
HGB BLD-MCNC: 7.3 G/DL (ref 12–16)
HGB BLD-MCNC: 7.7 G/DL (ref 12–16)
IMM GRANULOCYTES # BLD AUTO: 0.13 K/UL (ref 0–0.04)
IMM GRANULOCYTES # BLD AUTO: 0.16 K/UL (ref 0–0.04)
IMM GRANULOCYTES # BLD AUTO: 0.17 K/UL (ref 0–0.04)
IMM GRANULOCYTES NFR BLD AUTO: 1.1 % (ref 0–0.5)
IMM GRANULOCYTES NFR BLD AUTO: 1.5 % (ref 0–0.5)
IMM GRANULOCYTES NFR BLD AUTO: 1.6 % (ref 0–0.5)
LYMPHOCYTES # BLD AUTO: 0.9 K/UL (ref 1–4.8)
LYMPHOCYTES # BLD AUTO: 1.1 K/UL (ref 1–4.8)
LYMPHOCYTES # BLD AUTO: 1.1 K/UL (ref 1–4.8)
LYMPHOCYTES NFR BLD: 10.4 % (ref 18–48)
LYMPHOCYTES NFR BLD: 8.1 % (ref 18–48)
LYMPHOCYTES NFR BLD: 9.2 % (ref 18–48)
MAGNESIUM SERPL-MCNC: 2.1 MG/DL (ref 1.6–2.6)
MCH RBC QN AUTO: 28.5 PG (ref 27–31)
MCH RBC QN AUTO: 28.7 PG (ref 27–31)
MCH RBC QN AUTO: 29.3 PG (ref 27–31)
MCHC RBC AUTO-ENTMCNC: 29.5 G/DL (ref 32–36)
MCHC RBC AUTO-ENTMCNC: 29.8 G/DL (ref 32–36)
MCHC RBC AUTO-ENTMCNC: 30.5 G/DL (ref 32–36)
MCV RBC AUTO: 96 FL (ref 82–98)
MONOCYTES # BLD AUTO: 0.6 K/UL (ref 0.3–1)
MONOCYTES # BLD AUTO: 0.6 K/UL (ref 0.3–1)
MONOCYTES # BLD AUTO: 0.8 K/UL (ref 0.3–1)
MONOCYTES NFR BLD: 5.3 % (ref 4–15)
MONOCYTES NFR BLD: 5.5 % (ref 4–15)
MONOCYTES NFR BLD: 6.9 % (ref 4–15)
NEUTROPHILS # BLD AUTO: 8.7 K/UL (ref 1.8–7.7)
NEUTROPHILS # BLD AUTO: 8.9 K/UL (ref 1.8–7.7)
NEUTROPHILS # BLD AUTO: 9.9 K/UL (ref 1.8–7.7)
NEUTROPHILS NFR BLD: 79.4 % (ref 38–73)
NEUTROPHILS NFR BLD: 83 % (ref 38–73)
NEUTROPHILS NFR BLD: 83.8 % (ref 38–73)
NRBC BLD-RTO: 0 /100 WBC
PHOSPHATE SERPL-MCNC: 2.4 MG/DL (ref 2.7–4.5)
PLATELET # BLD AUTO: 159 K/UL (ref 150–450)
PLATELET # BLD AUTO: 163 K/UL (ref 150–450)
PLATELET # BLD AUTO: 179 K/UL (ref 150–450)
PMV BLD AUTO: 12 FL (ref 9.2–12.9)
PMV BLD AUTO: 12.3 FL (ref 9.2–12.9)
PMV BLD AUTO: 12.8 FL (ref 9.2–12.9)
POCT GLUCOSE: 107 MG/DL (ref 70–110)
POCT GLUCOSE: 72 MG/DL (ref 70–110)
POCT GLUCOSE: 78 MG/DL (ref 70–110)
POCT GLUCOSE: 95 MG/DL (ref 70–110)
POTASSIUM SERPL-SCNC: 3.9 MMOL/L (ref 3.5–5.1)
PROT SERPL-MCNC: 5 G/DL (ref 6–8.4)
RBC # BLD AUTO: 2.49 M/UL (ref 4–5.4)
RBC # BLD AUTO: 2.53 M/UL (ref 4–5.4)
RBC # BLD AUTO: 2.68 M/UL (ref 4–5.4)
SODIUM SERPL-SCNC: 142 MMOL/L (ref 136–145)
WBC # BLD AUTO: 10.67 K/UL (ref 3.9–12.7)
WBC # BLD AUTO: 10.93 K/UL (ref 3.9–12.7)
WBC # BLD AUTO: 11.89 K/UL (ref 3.9–12.7)

## 2022-02-17 PROCEDURE — 25000003 PHARM REV CODE 250: Performed by: STUDENT IN AN ORGANIZED HEALTH CARE EDUCATION/TRAINING PROGRAM

## 2022-02-17 PROCEDURE — 86900 BLOOD TYPING SEROLOGIC ABO: CPT | Performed by: INTERNAL MEDICINE

## 2022-02-17 PROCEDURE — P9021 RED BLOOD CELLS UNIT: HCPCS | Performed by: STUDENT IN AN ORGANIZED HEALTH CARE EDUCATION/TRAINING PROGRAM

## 2022-02-17 PROCEDURE — 20600001 HC STEP DOWN PRIVATE ROOM

## 2022-02-17 PROCEDURE — 86850 RBC ANTIBODY SCREEN: CPT | Performed by: INTERNAL MEDICINE

## 2022-02-17 PROCEDURE — 83735 ASSAY OF MAGNESIUM: CPT | Performed by: STUDENT IN AN ORGANIZED HEALTH CARE EDUCATION/TRAINING PROGRAM

## 2022-02-17 PROCEDURE — 86920 COMPATIBILITY TEST SPIN: CPT | Performed by: INTERNAL MEDICINE

## 2022-02-17 PROCEDURE — 82553 CREATINE MB FRACTION: CPT

## 2022-02-17 PROCEDURE — 25000242 PHARM REV CODE 250 ALT 637 W/ HCPCS: Performed by: STUDENT IN AN ORGANIZED HEALTH CARE EDUCATION/TRAINING PROGRAM

## 2022-02-17 PROCEDURE — 36415 COLL VENOUS BLD VENIPUNCTURE: CPT | Performed by: STUDENT IN AN ORGANIZED HEALTH CARE EDUCATION/TRAINING PROGRAM

## 2022-02-17 PROCEDURE — 27000221 HC OXYGEN, UP TO 24 HOURS

## 2022-02-17 PROCEDURE — 99232 SBSQ HOSP IP/OBS MODERATE 35: CPT | Mod: GC,,, | Performed by: INTERNAL MEDICINE

## 2022-02-17 PROCEDURE — 36430 TRANSFUSION BLD/BLD COMPNT: CPT

## 2022-02-17 PROCEDURE — 99232 PR SUBSEQUENT HOSPITAL CARE,LEVL II: ICD-10-PCS | Mod: GC,,, | Performed by: INTERNAL MEDICINE

## 2022-02-17 PROCEDURE — 25000003 PHARM REV CODE 250: Performed by: INTERNAL MEDICINE

## 2022-02-17 PROCEDURE — 84100 ASSAY OF PHOSPHORUS: CPT | Performed by: STUDENT IN AN ORGANIZED HEALTH CARE EDUCATION/TRAINING PROGRAM

## 2022-02-17 PROCEDURE — 99900035 HC TECH TIME PER 15 MIN (STAT)

## 2022-02-17 PROCEDURE — 94761 N-INVAS EAR/PLS OXIMETRY MLT: CPT

## 2022-02-17 PROCEDURE — 36415 COLL VENOUS BLD VENIPUNCTURE: CPT | Performed by: INTERNAL MEDICINE

## 2022-02-17 PROCEDURE — 85025 COMPLETE CBC W/AUTO DIFF WBC: CPT | Mod: 91 | Performed by: STUDENT IN AN ORGANIZED HEALTH CARE EDUCATION/TRAINING PROGRAM

## 2022-02-17 PROCEDURE — 94640 AIRWAY INHALATION TREATMENT: CPT

## 2022-02-17 PROCEDURE — 85025 COMPLETE CBC W/AUTO DIFF WBC: CPT | Performed by: INTERNAL MEDICINE

## 2022-02-17 PROCEDURE — 80053 COMPREHEN METABOLIC PANEL: CPT | Performed by: STUDENT IN AN ORGANIZED HEALTH CARE EDUCATION/TRAINING PROGRAM

## 2022-02-17 RX ORDER — HYDROCODONE BITARTRATE AND ACETAMINOPHEN 500; 5 MG/1; MG/1
TABLET ORAL
Status: DISCONTINUED | OUTPATIENT
Start: 2022-02-17 | End: 2022-02-21 | Stop reason: HOSPADM

## 2022-02-17 RX ADMIN — PANTOPRAZOLE SODIUM 40 MG: 40 TABLET, DELAYED RELEASE ORAL at 09:02

## 2022-02-17 RX ADMIN — TIOTROPIUM BROMIDE INHALATION SPRAY 2 PUFF: 3.12 SPRAY, METERED RESPIRATORY (INHALATION) at 08:02

## 2022-02-17 RX ADMIN — IPRATROPIUM BROMIDE AND ALBUTEROL SULFATE 3 ML: 2.5; .5 SOLUTION RESPIRATORY (INHALATION) at 01:02

## 2022-02-17 RX ADMIN — HYDROCODONE BITARTRATE AND ACETAMINOPHEN 1 TABLET: 5; 325 TABLET ORAL at 09:02

## 2022-02-17 RX ADMIN — FLUTICASONE FUROATE AND VILANTEROL TRIFENATATE 1 PUFF: 100; 25 POWDER RESPIRATORY (INHALATION) at 08:02

## 2022-02-17 RX ADMIN — TICAGRELOR 90 MG: 90 TABLET ORAL at 10:02

## 2022-02-17 RX ADMIN — ASPIRIN 81 MG: 81 TABLET, COATED ORAL at 09:02

## 2022-02-17 RX ADMIN — IPRATROPIUM BROMIDE AND ALBUTEROL SULFATE 3 ML: 2.5; .5 SOLUTION RESPIRATORY (INHALATION) at 08:02

## 2022-02-17 RX ADMIN — ATORVASTATIN CALCIUM 80 MG: 20 TABLET, FILM COATED ORAL at 10:02

## 2022-02-17 RX ADMIN — AMLODIPINE BESYLATE 10 MG: 10 TABLET ORAL at 09:02

## 2022-02-17 RX ADMIN — TICAGRELOR 90 MG: 90 TABLET ORAL at 09:02

## 2022-02-17 RX ADMIN — METOPROLOL SUCCINATE 50 MG: 50 TABLET, EXTENDED RELEASE ORAL at 09:02

## 2022-02-17 RX ADMIN — POLYETHYLENE GLYCOL 3350 17 G: 17 POWDER, FOR SOLUTION ORAL at 09:02

## 2022-02-17 RX ADMIN — IPRATROPIUM BROMIDE AND ALBUTEROL SULFATE 3 ML: 2.5; .5 SOLUTION RESPIRATORY (INHALATION) at 09:02

## 2022-02-17 NOTE — PLAN OF CARE
SW met with pt at bedside to discuss discharge planning but pt stated that her daughter was on her way and requested that SW have this conversation when her daughter is here.  Pt stated that she knew she was going to have to go somewhere for therapy, so SW briefly discussed recs for SNF.  ADRIANA asked OFELIA Denise to notify SW when daughter is at bedside.    UPDATE 4:20 PM  SW printed a list of facilities and gave it to pt to review with her daughter when she comes.  SW's phone number also provided in case pt's daughter wanted to call and discuss.    Ronda Esquivel, MARIE  [facility]  [phone]

## 2022-02-17 NOTE — PROGRESS NOTES
[facility]  Cardiology  Progress Note     Hospital Length of Stay: 6    Interval History: No acute events overnight. Patient without new complaint this morning. Still has not had a BM.     Vitals:  Temp:  [97.6 °F (36.4 °C)-99.7 °F (37.6 °C)] 99.7 °F (37.6 °C)  Pulse:  [66-83] 73  Resp:  [16-26] 16  SpO2:  [91 %-100 %] 91 %  BP: (108-148)/(46-65) 136/63    Intake/Output    Intake/Output Summary (Last 24 hours) at 2/17/2022 1402  Last data filed at 2/17/2022 0900  Gross per 24 hour   Intake 1047.48 ml   Output 800 ml   Net 247.48 ml       Physical Exam  Gen: No apparent distress, resting comfortably  HEENT: Pupils equal and reactive to light  Cardio: Regular rate, point of maximal impulse not displaced, no murmur noted, 2+ radial pulses bilaterally, 2+ DP pulses bilaterally  Resp: CTAB, no wheezing  Abd: Soft, non-tender, distended but decreasing in size  Skin: Warm, dry, no peripheral edema noted  Neuro: Alert and oriented x3  Psych: Normal mood and affect    Labs:  Recent Labs   Lab 02/16/22  0516 02/16/22  1624 02/17/22  0428   WBC 17.02* 13.92* 10.93   HGB 7.9* 7.3* 7.7*   HCT 26.1* 24.6* 25.8*    167 159     Recent Labs   Lab 02/15/22  0354 02/16/22  0516 02/17/22  0428    144 142   K 4.7 4.2 3.9    107 108   CO2 25 27 26   BUN 50* 59* 50*   CREATININE 1.7* 1.9* 1.4   * 87 74   CALCIUM 9.0 9.0 8.4*   MG 2.1 2.2 2.1   PHOS 4.1 3.4 2.4*         Current Meds:   albuterol-ipratropium  3 mL Nebulization Q6H WAKE    amLODIPine  10 mg Oral Daily    aspirin  81 mg Oral Daily    atorvastatin  80 mg Oral QHS    fluticasone furoate-vilanteroL  1 puff Inhalation Daily    metoprolol succinate  50 mg Oral Daily    pantoprazole  40 mg Oral Daily    polyethylene glycol  17 g Oral Daily    ticagrelor  90 mg Oral BID    tiotropium bromide  2 puff Inhalation Daily       Continuous Infusions:      PRN:  sodium chloride, acetaminophen, dextrose 10%, dextrose 10%, glucagon  (human recombinant), glucose, glucose, HYDROcodone-acetaminophen, insulin aspart U-100, melatonin, ondansetron, ondansetron, senna-docusate 8.6-50 mg, sodium chloride 0.9%, sodium chloride 0.9%    Assessment and Plan:    1. NSTEMI  -LHC at outside facility with 60% prox LAD, 90% mid RCA, occluded distal RCA with L to R collaterals  -Turned down for CABG  -ANUSHKA 6, SYLVESTER 136  -Continue ASA/Brilinta/high intensity statin  -Spoke with Interventional today, plan to give 2 u pRBC to get Hb closer to 10 and if cath decided on as inpatient will do on Monday  -Patient has contrast allergy and will need to be prepped before any LHC    2. Rectus sheath hematoma/anemia  -Normocytic anemia in setting of acute loss due to rectus sheath hematoma (admit Hb 11)  -IR performed embolization of inferior epigastric artery  -Hb 7.7 this morning, will transfuse 2 u pRBC    3. ISSA on CKD III  -Baseline creatinine 1.5-1.8  -Likely pre-renal in etiology  -Improving after IV fluids    4. Type II DM  -Low dose sliding scale    5. COPD  -On home O2 2-3 L, continue while here    6. Pulmonary nodule  -CT Chest on 10/7/21: Right lower lobe nodule measuring 10x11 mm    7. HTN  -Amlodipine 10 mg qd  -Metoprolol succinate 50 mg qd    Chance Tena MD  [facility] PGY-4    Pager number: [phone]    Staff attestation to follow.    This note was prepared using voice recognition system and may have sound alike errors that may have been overlooked even with proof reading.

## 2022-02-18 LAB
ALBUMIN SERPL BCP-MCNC: 2.5 G/DL (ref 3.5–5.2)
ALP SERPL-CCNC: 54 U/L (ref 55–135)
ALT SERPL W/O P-5'-P-CCNC: 22 U/L (ref 10–44)
ANION GAP SERPL CALC-SCNC: 11 MMOL/L (ref 8–16)
AST SERPL-CCNC: 28 U/L (ref 10–40)
BASOPHILS # BLD AUTO: 0.01 K/UL (ref 0–0.2)
BASOPHILS # BLD AUTO: 0.01 K/UL (ref 0–0.2)
BASOPHILS # BLD AUTO: 0.02 K/UL (ref 0–0.2)
BASOPHILS NFR BLD: 0.1 % (ref 0–1.9)
BASOPHILS NFR BLD: 0.1 % (ref 0–1.9)
BASOPHILS NFR BLD: 0.2 % (ref 0–1.9)
BILIRUB SERPL-MCNC: 2 MG/DL (ref 0.1–1)
BLD PROD TYP BPU: NORMAL
BLOOD UNIT EXPIRATION DATE: NORMAL
BLOOD UNIT TYPE CODE: 9500
BLOOD UNIT TYPE: NORMAL
BUN SERPL-MCNC: 42 MG/DL (ref 8–23)
CALCIUM SERPL-MCNC: 8.4 MG/DL (ref 8.7–10.5)
CHLORIDE SERPL-SCNC: 109 MMOL/L (ref 95–110)
CO2 SERPL-SCNC: 25 MMOL/L (ref 23–29)
CODING SYSTEM: NORMAL
CREAT SERPL-MCNC: 1 MG/DL (ref 0.5–1.4)
DIFFERENTIAL METHOD: ABNORMAL
DISPENSE STATUS: NORMAL
EOSINOPHIL # BLD AUTO: 0.1 K/UL (ref 0–0.5)
EOSINOPHIL # BLD AUTO: 0.1 K/UL (ref 0–0.5)
EOSINOPHIL # BLD AUTO: 0.2 K/UL (ref 0–0.5)
EOSINOPHIL NFR BLD: 0.8 % (ref 0–8)
EOSINOPHIL NFR BLD: 0.8 % (ref 0–8)
EOSINOPHIL NFR BLD: 1.3 % (ref 0–8)
ERYTHROCYTE [DISTWIDTH] IN BLOOD BY AUTOMATED COUNT: 14.4 % (ref 11.5–14.5)
ERYTHROCYTE [DISTWIDTH] IN BLOOD BY AUTOMATED COUNT: 14.6 % (ref 11.5–14.5)
ERYTHROCYTE [DISTWIDTH] IN BLOOD BY AUTOMATED COUNT: 14.7 % (ref 11.5–14.5)
EST. GFR  (AFRICAN AMERICAN): >60 ML/MIN/1.73 M^2
EST. GFR  (NON AFRICAN AMERICAN): 55.6 ML/MIN/1.73 M^2
GLUCOSE SERPL-MCNC: 82 MG/DL (ref 70–110)
HCT VFR BLD AUTO: 24.6 % (ref 37–48.5)
HCT VFR BLD AUTO: 29 % (ref 37–48.5)
HCT VFR BLD AUTO: 31.6 % (ref 37–48.5)
HGB BLD-MCNC: 7.7 G/DL (ref 12–16)
HGB BLD-MCNC: 9.2 G/DL (ref 12–16)
HGB BLD-MCNC: 9.9 G/DL (ref 12–16)
IMM GRANULOCYTES # BLD AUTO: 0.14 K/UL (ref 0–0.04)
IMM GRANULOCYTES # BLD AUTO: 0.15 K/UL (ref 0–0.04)
IMM GRANULOCYTES # BLD AUTO: 0.24 K/UL (ref 0–0.04)
IMM GRANULOCYTES NFR BLD AUTO: 1.2 % (ref 0–0.5)
IMM GRANULOCYTES NFR BLD AUTO: 1.2 % (ref 0–0.5)
IMM GRANULOCYTES NFR BLD AUTO: 1.6 % (ref 0–0.5)
LYMPHOCYTES # BLD AUTO: 0.6 K/UL (ref 1–4.8)
LYMPHOCYTES # BLD AUTO: 0.8 K/UL (ref 1–4.8)
LYMPHOCYTES # BLD AUTO: 1 K/UL (ref 1–4.8)
LYMPHOCYTES NFR BLD: 4.5 % (ref 18–48)
LYMPHOCYTES NFR BLD: 5.3 % (ref 18–48)
LYMPHOCYTES NFR BLD: 8.4 % (ref 18–48)
MAGNESIUM SERPL-MCNC: 1.9 MG/DL (ref 1.6–2.6)
MCH RBC QN AUTO: 28.6 PG (ref 27–31)
MCH RBC QN AUTO: 28.8 PG (ref 27–31)
MCH RBC QN AUTO: 29 PG (ref 27–31)
MCHC RBC AUTO-ENTMCNC: 31.3 G/DL (ref 32–36)
MCHC RBC AUTO-ENTMCNC: 31.3 G/DL (ref 32–36)
MCHC RBC AUTO-ENTMCNC: 31.7 G/DL (ref 32–36)
MCV RBC AUTO: 91 FL (ref 82–98)
MCV RBC AUTO: 91 FL (ref 82–98)
MCV RBC AUTO: 93 FL (ref 82–98)
MONOCYTES # BLD AUTO: 0.5 K/UL (ref 0.3–1)
MONOCYTES # BLD AUTO: 0.7 K/UL (ref 0.3–1)
MONOCYTES # BLD AUTO: 0.7 K/UL (ref 0.3–1)
MONOCYTES NFR BLD: 4.2 % (ref 4–15)
MONOCYTES NFR BLD: 4.7 % (ref 4–15)
MONOCYTES NFR BLD: 6.1 % (ref 4–15)
NEUTROPHILS # BLD AUTO: 10.1 K/UL (ref 1.8–7.7)
NEUTROPHILS # BLD AUTO: 11.6 K/UL (ref 1.8–7.7)
NEUTROPHILS # BLD AUTO: 13.1 K/UL (ref 1.8–7.7)
NEUTROPHILS NFR BLD: 82.8 % (ref 38–73)
NEUTROPHILS NFR BLD: 87.5 % (ref 38–73)
NEUTROPHILS NFR BLD: 89.2 % (ref 38–73)
NRBC BLD-RTO: 0 /100 WBC
PHOSPHATE SERPL-MCNC: 2.1 MG/DL (ref 2.7–4.5)
PLATELET # BLD AUTO: 129 K/UL (ref 150–450)
PLATELET # BLD AUTO: 150 K/UL (ref 150–450)
PLATELET # BLD AUTO: 163 K/UL (ref 150–450)
PMV BLD AUTO: 12.4 FL (ref 9.2–12.9)
PMV BLD AUTO: 12.5 FL (ref 9.2–12.9)
PMV BLD AUTO: 12.8 FL (ref 9.2–12.9)
POCT GLUCOSE: 142 MG/DL (ref 70–110)
POCT GLUCOSE: 174 MG/DL (ref 70–110)
POCT GLUCOSE: 92 MG/DL (ref 70–110)
POCT GLUCOSE: 95 MG/DL (ref 70–110)
POTASSIUM SERPL-SCNC: 3.8 MMOL/L (ref 3.5–5.1)
PROT SERPL-MCNC: 5 G/DL (ref 6–8.4)
RBC # BLD AUTO: 2.69 M/UL (ref 4–5.4)
RBC # BLD AUTO: 3.19 M/UL (ref 4–5.4)
RBC # BLD AUTO: 3.41 M/UL (ref 4–5.4)
SODIUM SERPL-SCNC: 145 MMOL/L (ref 136–145)
TRANS ERYTHROCYTES VOL PATIENT: NORMAL ML
WBC # BLD AUTO: 12.14 K/UL (ref 3.9–12.7)
WBC # BLD AUTO: 12.95 K/UL (ref 3.9–12.7)
WBC # BLD AUTO: 14.99 K/UL (ref 3.9–12.7)

## 2022-02-18 PROCEDURE — 25000003 PHARM REV CODE 250: Performed by: INTERNAL MEDICINE

## 2022-02-18 PROCEDURE — 36415 COLL VENOUS BLD VENIPUNCTURE: CPT | Performed by: INTERNAL MEDICINE

## 2022-02-18 PROCEDURE — 36415 COLL VENOUS BLD VENIPUNCTURE: CPT

## 2022-02-18 PROCEDURE — 20600001 HC STEP DOWN PRIVATE ROOM

## 2022-02-18 PROCEDURE — 25000242 PHARM REV CODE 250 ALT 637 W/ HCPCS: Performed by: STUDENT IN AN ORGANIZED HEALTH CARE EDUCATION/TRAINING PROGRAM

## 2022-02-18 PROCEDURE — 94640 AIRWAY INHALATION TREATMENT: CPT

## 2022-02-18 PROCEDURE — 97530 THERAPEUTIC ACTIVITIES: CPT

## 2022-02-18 PROCEDURE — P9021 RED BLOOD CELLS UNIT: HCPCS | Performed by: INTERNAL MEDICINE

## 2022-02-18 PROCEDURE — 25000003 PHARM REV CODE 250

## 2022-02-18 PROCEDURE — 99231 PR SUBSEQUENT HOSPITAL CARE,LEVL I: ICD-10-PCS | Mod: GC,,, | Performed by: INTERNAL MEDICINE

## 2022-02-18 PROCEDURE — 84100 ASSAY OF PHOSPHORUS: CPT | Performed by: STUDENT IN AN ORGANIZED HEALTH CARE EDUCATION/TRAINING PROGRAM

## 2022-02-18 PROCEDURE — 80053 COMPREHEN METABOLIC PANEL: CPT | Performed by: STUDENT IN AN ORGANIZED HEALTH CARE EDUCATION/TRAINING PROGRAM

## 2022-02-18 PROCEDURE — 85025 COMPLETE CBC W/AUTO DIFF WBC: CPT

## 2022-02-18 PROCEDURE — 36415 COLL VENOUS BLD VENIPUNCTURE: CPT | Performed by: STUDENT IN AN ORGANIZED HEALTH CARE EDUCATION/TRAINING PROGRAM

## 2022-02-18 PROCEDURE — 85025 COMPLETE CBC W/AUTO DIFF WBC: CPT | Mod: 91 | Performed by: INTERNAL MEDICINE

## 2022-02-18 PROCEDURE — 99231 SBSQ HOSP IP/OBS SF/LOW 25: CPT | Mod: GC,,, | Performed by: INTERNAL MEDICINE

## 2022-02-18 PROCEDURE — 27000221 HC OXYGEN, UP TO 24 HOURS

## 2022-02-18 PROCEDURE — 97116 GAIT TRAINING THERAPY: CPT

## 2022-02-18 PROCEDURE — 25000003 PHARM REV CODE 250: Performed by: STUDENT IN AN ORGANIZED HEALTH CARE EDUCATION/TRAINING PROGRAM

## 2022-02-18 PROCEDURE — 99900035 HC TECH TIME PER 15 MIN (STAT)

## 2022-02-18 PROCEDURE — 94761 N-INVAS EAR/PLS OXIMETRY MLT: CPT

## 2022-02-18 PROCEDURE — 63600175 PHARM REV CODE 636 W HCPCS

## 2022-02-18 PROCEDURE — 83735 ASSAY OF MAGNESIUM: CPT | Performed by: STUDENT IN AN ORGANIZED HEALTH CARE EDUCATION/TRAINING PROGRAM

## 2022-02-18 RX ORDER — FUROSEMIDE 40 MG/1
40 TABLET ORAL 2 TIMES DAILY
Status: DISCONTINUED | OUTPATIENT
Start: 2022-02-18 | End: 2022-02-19

## 2022-02-18 RX ORDER — SODIUM,POTASSIUM PHOSPHATES 280-250MG
2 POWDER IN PACKET (EA) ORAL EVERY 4 HOURS
Status: COMPLETED | OUTPATIENT
Start: 2022-02-18 | End: 2022-02-18

## 2022-02-18 RX ORDER — POTASSIUM CHLORIDE 20 MEQ/1
40 TABLET, EXTENDED RELEASE ORAL ONCE
Status: COMPLETED | OUTPATIENT
Start: 2022-02-18 | End: 2022-02-18

## 2022-02-18 RX ORDER — FUROSEMIDE 10 MG/ML
80 INJECTION INTRAMUSCULAR; INTRAVENOUS ONCE
Status: COMPLETED | OUTPATIENT
Start: 2022-02-18 | End: 2022-02-18

## 2022-02-18 RX ADMIN — TICAGRELOR 90 MG: 90 TABLET ORAL at 08:02

## 2022-02-18 RX ADMIN — HYDROCODONE BITARTRATE AND ACETAMINOPHEN 1 TABLET: 5; 325 TABLET ORAL at 09:02

## 2022-02-18 RX ADMIN — AMLODIPINE BESYLATE 10 MG: 10 TABLET ORAL at 09:02

## 2022-02-18 RX ADMIN — TIOTROPIUM BROMIDE INHALATION SPRAY 2 PUFF: 3.12 SPRAY, METERED RESPIRATORY (INHALATION) at 10:02

## 2022-02-18 RX ADMIN — ASPIRIN 81 MG: 81 TABLET, COATED ORAL at 09:02

## 2022-02-18 RX ADMIN — HYDROCODONE BITARTRATE AND ACETAMINOPHEN 1 TABLET: 5; 325 TABLET ORAL at 05:02

## 2022-02-18 RX ADMIN — PANTOPRAZOLE SODIUM 40 MG: 40 TABLET, DELAYED RELEASE ORAL at 09:02

## 2022-02-18 RX ADMIN — IPRATROPIUM BROMIDE AND ALBUTEROL SULFATE 3 ML: 2.5; .5 SOLUTION RESPIRATORY (INHALATION) at 07:02

## 2022-02-18 RX ADMIN — POTASSIUM CHLORIDE 40 MEQ: 1500 TABLET, EXTENDED RELEASE ORAL at 09:02

## 2022-02-18 RX ADMIN — TICAGRELOR 90 MG: 90 TABLET ORAL at 09:02

## 2022-02-18 RX ADMIN — FUROSEMIDE 80 MG: 10 INJECTION, SOLUTION INTRAMUSCULAR; INTRAVENOUS at 12:02

## 2022-02-18 RX ADMIN — FUROSEMIDE 40 MG: 40 TABLET ORAL at 07:02

## 2022-02-18 RX ADMIN — METOPROLOL SUCCINATE 50 MG: 50 TABLET, EXTENDED RELEASE ORAL at 09:02

## 2022-02-18 RX ADMIN — IPRATROPIUM BROMIDE AND ALBUTEROL SULFATE 3 ML: 2.5; .5 SOLUTION RESPIRATORY (INHALATION) at 01:02

## 2022-02-18 RX ADMIN — FLUTICASONE FUROATE AND VILANTEROL TRIFENATATE 1 PUFF: 100; 25 POWDER RESPIRATORY (INHALATION) at 10:02

## 2022-02-18 RX ADMIN — POTASSIUM & SODIUM PHOSPHATES POWDER PACK 280-160-250 MG 2 PACKET: 280-160-250 PACK at 09:02

## 2022-02-18 RX ADMIN — POTASSIUM & SODIUM PHOSPHATES POWDER PACK 280-160-250 MG 2 PACKET: 280-160-250 PACK at 02:02

## 2022-02-18 RX ADMIN — ATORVASTATIN CALCIUM 80 MG: 20 TABLET, FILM COATED ORAL at 08:02

## 2022-02-18 NOTE — ASSESSMENT & PLAN NOTE
"-Recent hx of increasing episodes of angina requiring more frequent Nitroglycerin use and underwent LHC at OSH.   -At OSH, Episode of 10/10 chest pain all over that she described as "burning", and "veins being ripped apart". Stat EKG at the time showed concerns for  lateral precordial ST depressions. She was initiated on nitroglycerin drip, heparin drip. One dose of  morphine 2mg & Lopressor 5mg IV were also administered. She reported alleviation of her chest pain after administration. Stable on Nitroglycerin drip. She was transferred to our hospital for further evaluation of her ACS and potential emergent intervention. LHC at outside hospital was notable for significant coronary artery disease:  Recent Left heart cath[02/11/22] showed:  · The RPAV lesion was 100% stenosed.  · The RPDA lesion was 100% stenosed.  · The Prox Cx to Mid Cx lesion was 80% stenosed.  · The Dist Cx lesion was 70% stenosed.  · The 1st LPL lesion was 90% stenosed.  · The Prox RCA-2 lesion was 70% stenosed.  · The Prox RCA-1 lesion was 90% stenosed.  · The Mid LAD-2 lesion was 60% stenosed.  · The estimated blood loss was <50 mL.  · There was three vessel coronary artery disease.    -ANUSHKA score: 6 points, 41% risk at 14 days of: all-cause mortality, new or recurrent MI, or severe recurrent ischemia requiring urgent revascularization.  -SYLVESTER Score: 136 points, 13 % probability of death from admission to 6 months      Plan:   - Continuous Telemetry monitoring  - continue 81 mg ASA qd  - Atorvastatin 80mg qhs  - Metoprolol 50 mg XL  - continue Ticagrelor 90mg bid  - patient has contrast allergy, will start patient on prednisone 50 mg, famotidine 20 mg, diphenhydramine 50 when planned for LHC.   - LHC deferred given rectus sheath hematoma. Will touch base with IC and start heparin gtt. Planned for Monday 2/21 at the moment        "

## 2022-02-18 NOTE — SUBJECTIVE & OBJECTIVE
Interval History: No acute events overnight. Patient had a large BM overnight and was short of breath.     Review of Systems   Constitutional: Negative for decreased appetite, fever, weight gain and weight loss.   HENT: Negative for congestion and sore throat.    Eyes: Negative for blurred vision, vision loss in left eye, vision loss in right eye and visual disturbance.   Cardiovascular: Negative for chest pain, leg swelling (at baseline) and palpitations.   Respiratory: Positive for shortness of breath. Negative for cough.    Skin: Negative for flushing and rash.   Gastrointestinal: Negative for abdominal pain, constipation, diarrhea, heartburn, nausea and vomiting.   Genitourinary: Negative for dysuria and flank pain.   Neurological: Negative for focal weakness, headaches, light-headedness and weakness.   Psychiatric/Behavioral: Negative for altered mental status and substance abuse. The patient is not nervous/anxious.    Allergic/Immunologic: Negative for environmental allergies and hives.     Objective:     Vital Signs (Most Recent):  Temp: 98.7 °F (37.1 °C) (02/18/22 1216)  Pulse: 72 (02/18/22 1300)  Resp: 18 (02/18/22 1300)  BP: (!) 128/58 (02/18/22 1216)  SpO2: 97 % (02/18/22 1300) Vital Signs (24h Range):  Temp:  [96.7 °F (35.9 °C)-99.3 °F (37.4 °C)] 98.7 °F (37.1 °C)  Pulse:  [] 72  Resp:  [16-20] 18  SpO2:  [91 %-100 %] 97 %  BP: (120-142)/(53-68) 128/58     Weight: 90 kg (198 lb 6.6 oz)  Body mass index is 35.15 kg/m².     SpO2: 97 %  O2 Device (Oxygen Therapy): nasal cannula      Intake/Output Summary (Last 24 hours) at 2/18/2022 1441  Last data filed at 2/18/2022 1357  Gross per 24 hour   Intake 971.25 ml   Output 1 ml   Net 970.25 ml       Lines/Drains/Airways     Drain            Female External Urinary Catheter 02/12/22 0700 6 days          Peripheral Intravenous Line                 Peripheral IV - Single Lumen 02/14/22 1500 18 G Left Breast 3 days         Peripheral IV - Single Lumen 02/14/22  1500 18 G Right Breast 3 days                Physical Exam  Vitals and nursing note reviewed.   Constitutional:       General: She is not in acute distress.     Appearance: Normal appearance. She is obese. She is not ill-appearing, toxic-appearing or diaphoretic.   HENT:      Head: Normocephalic and atraumatic.      Right Ear: External ear normal.      Left Ear: External ear normal.   Eyes:      General: No scleral icterus.        Right eye: No discharge.         Left eye: No discharge.      Extraocular Movements: Extraocular movements intact.      Conjunctiva/sclera: Conjunctivae normal.   Cardiovascular:      Rate and Rhythm: Normal rate and regular rhythm.      Pulses: Normal pulses.      Heart sounds: Normal heart sounds.   Pulmonary:      Effort: Pulmonary effort is normal. No respiratory distress.      Breath sounds: No wheezing or rales.   Chest:      Chest wall: No tenderness.   Abdominal:      General: There is no distension.      Palpations: There is mass.      Tenderness: There is no abdominal tenderness. There is no guarding.   Musculoskeletal:         General: No swelling or tenderness. Normal range of motion.      Cervical back: Normal range of motion and neck supple.      Right lower leg: Edema present.      Left lower leg: Edema present.   Skin:     General: Skin is warm and dry.      Capillary Refill: Capillary refill takes less than 2 seconds.      Findings: Erythema (lower extermity venous stasis) present.   Neurological:      General: No focal deficit present.      Mental Status: She is alert. Mental status is at baseline.   Psychiatric:         Mood and Affect: Mood normal.         Behavior: Behavior normal.         Significant Labs:   CMP   Recent Labs   Lab 02/17/22  0428 02/18/22  0442    145   K 3.9 3.8    109   CO2 26 25   GLU 74 82   BUN 50* 42*   CREATININE 1.4 1.0   CALCIUM 8.4* 8.4*   PROT 5.0* 5.0*   ALBUMIN 2.7* 2.5*   BILITOT 0.8 2.0*   ALKPHOS 55 54*   AST 33 28   ALT 26  22   ANIONGAP 8 11   ESTGFRAFRICA 42.7* >60.0   EGFRNONAA 37.0* 55.6*   , CBC   Recent Labs   Lab 02/17/22  1828 02/17/22  1828 02/18/22 0442 02/18/22 0442 02/18/22  1007   WBC 11.89  --  14.99*  --  12.95*   HGB 7.2*  --  7.7*  --  9.2*   HCT 24.4*   < > 24.6*   < > 29.0*     --  150  --  129*    < > = values in this interval not displayed.    and All pertinent lab results from the last 24 hours have been reviewed.    Significant Imaging: no new images

## 2022-02-18 NOTE — ASSESSMENT & PLAN NOTE
Baseline creatinine 1.5- 1.8   Creatinine uptrended from admission, but stable for now.   Recent Labs     02/16/22  0516 02/17/22  0428 02/18/22  0442   CREATININE 1.9* 1.4 1.0   BUN 59* 50* 42*     Estimated Creatinine Clearance: 52.5 mL/min (based on SCr of 1 mg/dL). according to latest data. Cr elevated to 1.9 likely due to SHIRA. Will hold off on LHC until Cr stabilizes.     PLAN  Monitor UOP and serial BMP and adjust therapy as needed.   Avoid nephrotoxic meds  Renally dose meds.

## 2022-02-18 NOTE — PLAN OF CARE
Problem: Occupational Therapy Goal  Goal: Occupational Therapy Goal  Description: Goals to be met by: 2/26/2022      Patient will increase functional independence with ADLs by performing:    LE Dressing using AE PRN with Stand-by Assistance.  Grooming while standing with Stand-by Assistance.  Toileting from toilet with Stand-by Assistance for hygiene and clothing management.   Supine to sit with Stand-by Assistance.  Step transfer with Stand-by Assistance    Outcome: Ongoing, Progressing

## 2022-02-18 NOTE — PT/OT/SLP PROGRESS
Occupational Therapy   Treatment    Name: Marisol Kerr  MRN: [id number]  Admitting Diagnosis:  NSTEMI (non-ST elevated myocardial infarction)       Recommendations:     Discharge Recommendations: nursing facility, skilled  Discharge Equipment Recommendations:   (TBD)  Barriers to discharge:  Decreased caregiver support    Assessment:     Marisol Kerr is a 76 y.o. female with a medical diagnosis of NSTEMI (non-ST elevated myocardial infarction).  She presents with ability to mobilize to/from bathroom, but required prolonged seated rest break 2* significant SOB. Pt unable to tolerate standing at sink for ADL.  Performance deficits affecting function are weakness,impaired balance,impaired endurance,impaired cardiopulmonary response to activity,impaired self care skills,impaired functional mobilty,gait instability.     Rehab Prognosis:  Good; patient would benefit from acute skilled OT services to address these deficits and reach maximum level of function.       Plan:     Patient to be seen 4 x/week to address the above listed problems via self-care/home management,therapeutic activities,therapeutic exercises  · Plan of Care Expires: 03/14/22  · Plan of Care Reviewed with: patient    Subjective     Pain/Comfort:  · Pain Rating 1: 0/10  · Pain Rating Post-Intervention 1: 0/10    Objective:     Communicated with: RN prior to session.  Patient found up in chair with telemetry,oxygen upon OT entry to room.    General Precautions: Standard, fall   Orthopedic Precautions:N/A   Braces:    Respiratory Status: Nasal cannula, flow 3 L/min     Occupational Performance:     Bed Mobility:    · NT     Functional Mobility/Transfers:  · Patient completed Sit <> Stand Transfer with contact guard assistance  with  rolling walker  from bedside chair and shower chair  · Functional Mobility: CGA<> SBA using RW    Activities of Daily Living:  · Grooming: supervision for set-up in sitting  · Lower Body Dressing: maximal assistance        [facility] 6  Click ADL: 17    Treatment & Education:  Pt ed on OT POC  Tx focused on education re: pursed lip breathing, diaphragmatic breathing, and energy conservation    Patient left up in chair with all lines intact, call button in reach and RN notifiedEducation:      GOALS:   Multidisciplinary Problems     Occupational Therapy Goals        Problem: Occupational Therapy Goal    Goal Priority Disciplines Outcome Interventions   Occupational Therapy Goal     OT, PT/OT Ongoing, Progressing    Description: Goals to be met by: 2/26/2022      Patient will increase functional independence with ADLs by performing:    LE Dressing using AE PRN with Stand-by Assistance.  Grooming while standing with Stand-by Assistance.  Toileting from toilet with Stand-by Assistance for hygiene and clothing management.   Supine to sit with Stand-by Assistance.  Step transfer with Stand-by Assistance                     Time Tracking:     OT Date of Treatment: 02/18/22  OT Start Time: 1032  OT Stop Time: 1058  OT Total Time (min): 26 min    Billable Minutes:Therapeutic Activity 26          [facility] Visit Number: 0    2/18/2022

## 2022-02-18 NOTE — PLAN OF CARE
Per treatment team pt still has multiple medical issues and they plan to speak with family about treatment and discharge options.  SW will follow up.    Ronda Esquivel LMSW  [facility]  [phone]

## 2022-02-18 NOTE — ASSESSMENT & PLAN NOTE
CXR nonconcerning for pleural effusion/pulmonary edema, but concerns for CHF, given patient had recent increasing SOB, BNP elevation(580), and NSTEMI. TTE notable for Grade II left ventricular diastolic dysfunction.    Transthoracic echo (TTE) complete[02/12/22]  Summary  · The left ventricle is normal in size with normal systolic function.  · The estimated ejection fraction is 65%.  · There is a minor septal wall motion abnormality.  · Severe left atrial enlargement.  · Grade II left ventricular diastolic dysfunction.  · Normal right ventricular size with normal right ventricular systolic function.  · Normal central venous pressure (3 mmHg).      Plan:  - start diuresis with 80 mg lasix IV and followed by lasix 40 BID  - Cardiac diet with Fluid restriction at 1.5L with strict I/Os and daily STANDING weights  - Maintain on telemetry  - Check Electrolytes, keep Mag >2 & K+ >4  - Ambulate as tolerated . PT/OT consulted

## 2022-02-18 NOTE — PROGRESS NOTES
[facility]  Cardiology  Progress Note    Patient Name: Marisol Kerr  MRN: [id number]  Admission Date: 2/11/2022  Hospital Length of Stay: 7 days  Code Status: Full Code   Attending Physician: Kaycee Mitchell MD   Primary Care Physician: Khadar Dwyer MD  Expected Discharge Date: 2/23/2022  Principal Problem:NSTEMI (non-ST elevated myocardial infarction)    Subjective:     Hospital Course:   Patient admitted to CCU while pending evaluation for ACS intervention, given her NSTEMI. Interventional Cardiology and Cardiothoracic Surgery consulted. Initiated guideline directed medical therapy for ACS. Nitroglycerin infusion for chest pain alleviation.  CTS evaluation did not recommend patient for CABG due to her debility as she is prohibitively high risk for CABG. Given contrast allergy, Interventional Cards started on prednisone 50 mg, famotidine 20, diphenhydramine 50 in anticipation of LHC. Patient had significant abdominal pain overnight and distended abdomen with mass. In light of this, IC deferred LHC. CT of the abdomen showed a large rectus sheath hematoma. Given patient's kidney function, CTA was deferred temporarily but the abdominal mass continued to enlarge. CTA of the abdomen showed extravasation of contrast into the rectus sheath hematoma. IR consulted and performed embolization of the inferior epigastric and collateral arteries. Patient's Cr elevated to 1.9 likely due to contrast. Started patient on slow NS infusion and will defer IC intervention until Cr returns to baseline or stabilizes. Patient's Hgb dropped requiring transfusion with aim >10 for IC intervention. Patient was given 2 pRBC.       Interval History: No acute events overnight. Patient had a large BM overnight and was short of breath.     Review of Systems   Constitutional: Negative for decreased appetite, fever, weight gain and weight loss.   HENT: Negative for congestion and sore throat.    Eyes: Negative for blurred  vision, vision loss in left eye, vision loss in right eye and visual disturbance.   Cardiovascular: Negative for chest pain, leg swelling (at baseline) and palpitations.   Respiratory: Positive for shortness of breath. Negative for cough.    Skin: Negative for flushing and rash.   Gastrointestinal: Negative for abdominal pain, constipation, diarrhea, heartburn, nausea and vomiting.   Genitourinary: Negative for dysuria and flank pain.   Neurological: Negative for focal weakness, headaches, light-headedness and weakness.   Psychiatric/Behavioral: Negative for altered mental status and substance abuse. The patient is not nervous/anxious.    Allergic/Immunologic: Negative for environmental allergies and hives.     Objective:     Vital Signs (Most Recent):  Temp: 98.7 °F (37.1 °C) (02/18/22 1216)  Pulse: 72 (02/18/22 1300)  Resp: 18 (02/18/22 1300)  BP: (!) 128/58 (02/18/22 1216)  SpO2: 97 % (02/18/22 1300) Vital Signs (24h Range):  Temp:  [96.7 °F (35.9 °C)-99.3 °F (37.4 °C)] 98.7 °F (37.1 °C)  Pulse:  [] 72  Resp:  [16-20] 18  SpO2:  [91 %-100 %] 97 %  BP: (120-142)/(53-68) 128/58     Weight: 90 kg (198 lb 6.6 oz)  Body mass index is 35.15 kg/m².     SpO2: 97 %  O2 Device (Oxygen Therapy): nasal cannula      Intake/Output Summary (Last 24 hours) at 2/18/2022 1441  Last data filed at 2/18/2022 1357  Gross per 24 hour   Intake 971.25 ml   Output 1 ml   Net 970.25 ml       Lines/Drains/Airways     Drain            Female External Urinary Catheter 02/12/22 0700 6 days          Peripheral Intravenous Line                 Peripheral IV - Single Lumen 02/14/22 1500 18 G Left Breast 3 days         Peripheral IV - Single Lumen 02/14/22 1500 18 G Right Breast 3 days                Physical Exam  Vitals and nursing note reviewed.   Constitutional:       General: She is not in acute distress.     Appearance: Normal appearance. She is obese. She is not ill-appearing, toxic-appearing or diaphoretic.   HENT:      Head:  Normocephalic and atraumatic.      Right Ear: External ear normal.      Left Ear: External ear normal.   Eyes:      General: No scleral icterus.        Right eye: No discharge.         Left eye: No discharge.      Extraocular Movements: Extraocular movements intact.      Conjunctiva/sclera: Conjunctivae normal.   Cardiovascular:      Rate and Rhythm: Normal rate and regular rhythm.      Pulses: Normal pulses.      Heart sounds: Normal heart sounds.   Pulmonary:      Effort: Pulmonary effort is normal. No respiratory distress.      Breath sounds: No wheezing or rales.   Chest:      Chest wall: No tenderness.   Abdominal:      General: There is no distension.      Palpations: There is mass.      Tenderness: There is no abdominal tenderness. There is no guarding.   Musculoskeletal:         General: No swelling or tenderness. Normal range of motion.      Cervical back: Normal range of motion and neck supple.      Right lower leg: Edema present.      Left lower leg: Edema present.   Skin:     General: Skin is warm and dry.      Capillary Refill: Capillary refill takes less than 2 seconds.      Findings: Erythema (lower extermity venous stasis) present.   Neurological:      General: No focal deficit present.      Mental Status: She is alert. Mental status is at baseline.   Psychiatric:         Mood and Affect: Mood normal.         Behavior: Behavior normal.         Significant Labs:   CMP   Recent Labs   Lab 02/17/22  0428 02/18/22 0442    145   K 3.9 3.8    109   CO2 26 25   GLU 74 82   BUN 50* 42*   CREATININE 1.4 1.0   CALCIUM 8.4* 8.4*   PROT 5.0* 5.0*   ALBUMIN 2.7* 2.5*   BILITOT 0.8 2.0*   ALKPHOS 55 54*   AST 33 28   ALT 26 22   ANIONGAP 8 11   ESTGFRAFRICA 42.7* >60.0   EGFRNONAA 37.0* 55.6*   , CBC   Recent Labs   Lab 02/17/22  1828 02/17/22  1828 02/18/22  0442 02/18/22  0442 02/18/22  1007   WBC 11.89  --  14.99*  --  12.95*   HGB 7.2*  --  7.7*  --  9.2*   HCT 24.4*   < > 24.6*   < > 29.0*   PLT  "179  --  150  --  129*    < > = values in this interval not displayed.    and All pertinent lab results from the last 24 hours have been reviewed.    Significant Imaging: no new images    Assessment and Plan:       * NSTEMI (non-ST elevated myocardial infarction)  -Recent hx of increasing episodes of angina requiring more frequent Nitroglycerin use and underwent LHC at OSH.   -At OSH, Episode of 10/10 chest pain all over that she described as "burning", and "veins being ripped apart". Stat EKG at the time showed concerns for  lateral precordial ST depressions. She was initiated on nitroglycerin drip, heparin drip. One dose of  morphine 2mg & Lopressor 5mg IV were also administered. She reported alleviation of her chest pain after administration. Stable on Nitroglycerin drip. She was transferred to our hospital for further evaluation of her ACS and potential emergent intervention. LHC at outside hospital was notable for significant coronary artery disease:  Recent Left heart cath[02/11/22] showed:  · The RPAV lesion was 100% stenosed.  · The RPDA lesion was 100% stenosed.  · The Prox Cx to Mid Cx lesion was 80% stenosed.  · The Dist Cx lesion was 70% stenosed.  · The 1st LPL lesion was 90% stenosed.  · The Prox RCA-2 lesion was 70% stenosed.  · The Prox RCA-1 lesion was 90% stenosed.  · The Mid LAD-2 lesion was 60% stenosed.  · The estimated blood loss was <50 mL.  · There was three vessel coronary artery disease.    -ANUSHKA score: 6 points, 41% risk at 14 days of: all-cause mortality, new or recurrent MI, or severe recurrent ischemia requiring urgent revascularization.  -SYLVESTER Score: 136 points, 13 % probability of death from admission to 6 months      Plan:   - Continuous Telemetry monitoring  - continue 81 mg ASA qd  - Atorvastatin 80mg qhs  - Metoprolol 50 mg XL  - continue Ticagrelor 90mg bid  - patient has contrast allergy, will start patient on prednisone 50 mg, famotidine 20 mg, diphenhydramine 50 when planned " for [facility].   - LHC deferred given rectus sheath hematoma. Will touch base with IC and start heparin gtt. Planned for Monday 2/21 at the moment          Acute on chronic diastolic congestive heart failure  CXR nonconcerning for pleural effusion/pulmonary edema, but concerns for CHF, given patient had recent increasing SOB, BNP elevation(580), and NSTEMI. TTE notable for Grade II left ventricular diastolic dysfunction.    Transthoracic echo (TTE) complete[02/12/22]  Summary  · The left ventricle is normal in size with normal systolic function.  · The estimated ejection fraction is 65%.  · There is a minor septal wall motion abnormality.  · Severe left atrial enlargement.  · Grade II left ventricular diastolic dysfunction.  · Normal right ventricular size with normal right ventricular systolic function.  · Normal central venous pressure (3 mmHg).      Plan:  - start diuresis with 80 mg lasix IV and followed by lasix 40 BID  - Cardiac diet with Fluid restriction at 1.5L with strict I/Os and daily STANDING weights  - Maintain on telemetry  - Check Electrolytes, keep Mag >2 & K+ >4  - Ambulate as tolerated . PT/OT consulted    CKD (chronic kidney disease), stage III  Baseline creatinine 1.5- 1.8   Creatinine uptrended from admission, but stable for now.   Recent Labs     02/16/22  0516 02/17/22  0428 02/18/22  0442   CREATININE 1.9* 1.4 1.0   BUN 59* 50* 42*     Estimated Creatinine Clearance: 52.5 mL/min (based on SCr of 1 mg/dL). according to latest data. Cr elevated to 1.9 likely due to SHIRA. Will hold off on LHC until Cr stabilizes.     PLAN  Monitor UOP and serial BMP and adjust therapy as needed.   Avoid nephrotoxic meds  Renally dose meds.        Gastroesophageal reflux disease without esophagitis  Stable.    PLAN  famotidine tablet 20 mg    DM type 2, controlled, with complication  Last A1c:   Lab Results   Component Value Date    HGBA1C 5.2 01/27/2022      Blood Sugars (AccuCheck):    Recent Labs     02/12/22  1209  "02/12/22  1727 02/12/22 2037 02/13/22  0748   POCTGLUCOSE 158* 158* 183* 155*         PLAN  -Low dose SSI  - Diabetic diet  - POCT glucose ACHS  - Will monitor glucose results and adjust insulin regimen accordingly      Essential hypertension, benign  BP was hypertensive at OSH. Stable on admission after initiation of home anti-hypertensives and  nitroglycerin drip .    Home medications:  amLODIPine (NORVASC) 5 MG, benazepriL (LOTENSIN) 40 MG ,     PLAN  metoprolol succinate (TOPROL-XL) 24 hr tablet 50 mg    amLODIPine tablet 10 mg    Hypercholesterolemia  Lab Results   Component Value Date    HDL 53 09/21/2021    LDLCALC 98.2 09/21/2021    CHOL 165 09/21/2021    TRIG 69 09/21/2021     PLAN  Atorvastatin 40mg    Coronary artery disease, multivessel with history of previous PCI  See "NSTEMI (non-ST elevated myocardial infarction)" A&P      Coronary atherosclerosis  See "NSTEMI (non-ST elevated myocardial infarction)" A&P    Rectus sheath hematoma  Normocytic Anemia  Blood loss Anemia  Patient had severe abdominal pain the day before with a mass on the abdomen. Patient was unable to have a BM yesterday. KUB was drawn and showed a large stool burden. Continued bowel regimen but pain was persistent. CT abdomen without contrast and lactic drawn. CT abdomen showed rectal sheath hematoma extending to the pelvis. IR consulted and recommended CTA of the abdomen to identify bleeding vessel. Due to kidney function, held off on CTA for concerns of contrast induced nephropathy as patient had a Alfredo score of 16 with 100cc of contrast, 15 with 75 cc contrast. IR consulted and following. Hgb went from 13 on admission --> 10.6 --> 9.9 --> 8.9. hematoma was expanding. IR performed embolization of inferior epigastric and collateral vessels.     Plan:  - continue to monitor abdominal mass  - transfuse >10 Hgb  - Trend CBC    Venous stasis  Chronic history of venous stasis of bilateral lower extremities limited to breakdown of skin " without varicose veins  Patient denies increase in leg swelling over her baseline.    PLAN  Nursing wound care PRN  Elevate legs    COPD/emphysema  Known history of severe COPD (GOLD IV D PFT 2020). On home oxygen (2-3L)    Home medications: albuterol (VENTOLIN HFA) 90 mcg/actuation inhaler, ipratropium-albuteroL (COMBIVENT RESPIMAT)  mcg/actuation inhaler,  umeclidinium (INCRUSE ELLIPTA) 62.5 mcg/actuation inhalation capsule, fluticasone-salmeterol diskus inhaler 250-50 mcg      PLAN  VBG PRN  Supplementary oxygen via NC with titration to maintain  O2 sats >90%  Duonebs q6h while awake  Tiotropium inhaler            VTE Risk Mitigation (From admission, onward)         Ordered     Reason for No Pharmacological VTE Prophylaxis  Once        Question:  Reasons:  Answer:  Physician Provided (leave comment)    02/11/22 1446     IP VTE HIGH RISK PATIENT  Once         02/11/22 1446     Place sequential compression device  Until discontinued         02/11/22 1446                Becca Jacobson MD  Cardiology  [facility]

## 2022-02-19 LAB
ALBUMIN SERPL BCP-MCNC: 2.9 G/DL (ref 3.5–5.2)
ALP SERPL-CCNC: 74 U/L (ref 55–135)
ALT SERPL W/O P-5'-P-CCNC: 22 U/L (ref 10–44)
ANION GAP SERPL CALC-SCNC: 11 MMOL/L (ref 8–16)
AST SERPL-CCNC: 23 U/L (ref 10–40)
BASOPHILS # BLD AUTO: 0.01 K/UL (ref 0–0.2)
BASOPHILS # BLD AUTO: 0.02 K/UL (ref 0–0.2)
BASOPHILS NFR BLD: 0.1 % (ref 0–1.9)
BASOPHILS NFR BLD: 0.2 % (ref 0–1.9)
BILIRUB SERPL-MCNC: 2.3 MG/DL (ref 0.1–1)
BUN SERPL-MCNC: 34 MG/DL (ref 8–23)
CALCIUM SERPL-MCNC: 8.8 MG/DL (ref 8.7–10.5)
CHLORIDE SERPL-SCNC: 100 MMOL/L (ref 95–110)
CO2 SERPL-SCNC: 32 MMOL/L (ref 23–29)
CREAT SERPL-MCNC: 1.3 MG/DL (ref 0.5–1.4)
DIFFERENTIAL METHOD: ABNORMAL
DIFFERENTIAL METHOD: ABNORMAL
EOSINOPHIL # BLD AUTO: 0.1 K/UL (ref 0–0.5)
EOSINOPHIL # BLD AUTO: 0.2 K/UL (ref 0–0.5)
EOSINOPHIL NFR BLD: 1 % (ref 0–8)
EOSINOPHIL NFR BLD: 2.2 % (ref 0–8)
ERYTHROCYTE [DISTWIDTH] IN BLOOD BY AUTOMATED COUNT: 14.6 % (ref 11.5–14.5)
ERYTHROCYTE [DISTWIDTH] IN BLOOD BY AUTOMATED COUNT: 14.6 % (ref 11.5–14.5)
EST. GFR  (AFRICAN AMERICAN): 46.7 ML/MIN/1.73 M^2
EST. GFR  (NON AFRICAN AMERICAN): 40.5 ML/MIN/1.73 M^2
GLUCOSE SERPL-MCNC: 123 MG/DL (ref 70–110)
HCT VFR BLD AUTO: 31.6 % (ref 37–48.5)
HCT VFR BLD AUTO: 34.3 % (ref 37–48.5)
HGB BLD-MCNC: 10.8 G/DL (ref 12–16)
HGB BLD-MCNC: 9.8 G/DL (ref 12–16)
IMM GRANULOCYTES # BLD AUTO: 0.12 K/UL (ref 0–0.04)
IMM GRANULOCYTES # BLD AUTO: 0.12 K/UL (ref 0–0.04)
IMM GRANULOCYTES NFR BLD AUTO: 1 % (ref 0–0.5)
IMM GRANULOCYTES NFR BLD AUTO: 1.2 % (ref 0–0.5)
LYMPHOCYTES # BLD AUTO: 0.8 K/UL (ref 1–4.8)
LYMPHOCYTES # BLD AUTO: 0.9 K/UL (ref 1–4.8)
LYMPHOCYTES NFR BLD: 7 % (ref 18–48)
LYMPHOCYTES NFR BLD: 8.4 % (ref 18–48)
MAGNESIUM SERPL-MCNC: 1.7 MG/DL (ref 1.6–2.6)
MCH RBC QN AUTO: 28.9 PG (ref 27–31)
MCH RBC QN AUTO: 28.9 PG (ref 27–31)
MCHC RBC AUTO-ENTMCNC: 31 G/DL (ref 32–36)
MCHC RBC AUTO-ENTMCNC: 31.5 G/DL (ref 32–36)
MCV RBC AUTO: 92 FL (ref 82–98)
MCV RBC AUTO: 93 FL (ref 82–98)
MONOCYTES # BLD AUTO: 0.7 K/UL (ref 0.3–1)
MONOCYTES # BLD AUTO: 1 K/UL (ref 0.3–1)
MONOCYTES NFR BLD: 7.2 % (ref 4–15)
MONOCYTES NFR BLD: 8.2 % (ref 4–15)
NEUTROPHILS # BLD AUTO: 8.3 K/UL (ref 1.8–7.7)
NEUTROPHILS # BLD AUTO: 9.6 K/UL (ref 1.8–7.7)
NEUTROPHILS NFR BLD: 80.9 % (ref 38–73)
NEUTROPHILS NFR BLD: 82.6 % (ref 38–73)
NRBC BLD-RTO: 0 /100 WBC
NRBC BLD-RTO: 0 /100 WBC
PHOSPHATE SERPL-MCNC: 2.8 MG/DL (ref 2.7–4.5)
PLATELET # BLD AUTO: 143 K/UL (ref 150–450)
PLATELET # BLD AUTO: 168 K/UL (ref 150–450)
PMV BLD AUTO: 11.8 FL (ref 9.2–12.9)
PMV BLD AUTO: 12.6 FL (ref 9.2–12.9)
POCT GLUCOSE: 125 MG/DL (ref 70–110)
POCT GLUCOSE: 135 MG/DL (ref 70–110)
POCT GLUCOSE: 135 MG/DL (ref 70–110)
POCT GLUCOSE: 216 MG/DL (ref 70–110)
POTASSIUM SERPL-SCNC: 3.8 MMOL/L (ref 3.5–5.1)
PROT SERPL-MCNC: 5.8 G/DL (ref 6–8.4)
RBC # BLD AUTO: 3.39 M/UL (ref 4–5.4)
RBC # BLD AUTO: 3.74 M/UL (ref 4–5.4)
SODIUM SERPL-SCNC: 143 MMOL/L (ref 136–145)
WBC # BLD AUTO: 10.23 K/UL (ref 3.9–12.7)
WBC # BLD AUTO: 11.58 K/UL (ref 3.9–12.7)

## 2022-02-19 PROCEDURE — 84100 ASSAY OF PHOSPHORUS: CPT | Performed by: STUDENT IN AN ORGANIZED HEALTH CARE EDUCATION/TRAINING PROGRAM

## 2022-02-19 PROCEDURE — 25000003 PHARM REV CODE 250

## 2022-02-19 PROCEDURE — 25000003 PHARM REV CODE 250: Performed by: STUDENT IN AN ORGANIZED HEALTH CARE EDUCATION/TRAINING PROGRAM

## 2022-02-19 PROCEDURE — 94640 AIRWAY INHALATION TREATMENT: CPT

## 2022-02-19 PROCEDURE — 99231 PR SUBSEQUENT HOSPITAL CARE,LEVL I: ICD-10-PCS | Mod: GC,,, | Performed by: INTERNAL MEDICINE

## 2022-02-19 PROCEDURE — 99900035 HC TECH TIME PER 15 MIN (STAT)

## 2022-02-19 PROCEDURE — 25000242 PHARM REV CODE 250 ALT 637 W/ HCPCS: Performed by: STUDENT IN AN ORGANIZED HEALTH CARE EDUCATION/TRAINING PROGRAM

## 2022-02-19 PROCEDURE — 25000003 PHARM REV CODE 250: Performed by: INTERNAL MEDICINE

## 2022-02-19 PROCEDURE — 36415 COLL VENOUS BLD VENIPUNCTURE: CPT | Performed by: STUDENT IN AN ORGANIZED HEALTH CARE EDUCATION/TRAINING PROGRAM

## 2022-02-19 PROCEDURE — 20600001 HC STEP DOWN PRIVATE ROOM

## 2022-02-19 PROCEDURE — 36415 COLL VENOUS BLD VENIPUNCTURE: CPT | Performed by: INTERNAL MEDICINE

## 2022-02-19 PROCEDURE — 63600175 PHARM REV CODE 636 W HCPCS: Performed by: INTERNAL MEDICINE

## 2022-02-19 PROCEDURE — 85025 COMPLETE CBC W/AUTO DIFF WBC: CPT | Mod: 91 | Performed by: INTERNAL MEDICINE

## 2022-02-19 PROCEDURE — 27000221 HC OXYGEN, UP TO 24 HOURS

## 2022-02-19 PROCEDURE — 80053 COMPREHEN METABOLIC PANEL: CPT | Performed by: STUDENT IN AN ORGANIZED HEALTH CARE EDUCATION/TRAINING PROGRAM

## 2022-02-19 PROCEDURE — 94761 N-INVAS EAR/PLS OXIMETRY MLT: CPT

## 2022-02-19 PROCEDURE — 99231 SBSQ HOSP IP/OBS SF/LOW 25: CPT | Mod: GC,,, | Performed by: INTERNAL MEDICINE

## 2022-02-19 PROCEDURE — 63600175 PHARM REV CODE 636 W HCPCS

## 2022-02-19 PROCEDURE — 83735 ASSAY OF MAGNESIUM: CPT | Performed by: STUDENT IN AN ORGANIZED HEALTH CARE EDUCATION/TRAINING PROGRAM

## 2022-02-19 RX ORDER — MUPIROCIN 20 MG/G
OINTMENT TOPICAL 2 TIMES DAILY
Status: DISCONTINUED | OUTPATIENT
Start: 2022-02-19 | End: 2022-02-21 | Stop reason: HOSPADM

## 2022-02-19 RX ADMIN — MUPIROCIN: 20 OINTMENT TOPICAL at 09:02

## 2022-02-19 RX ADMIN — INSULIN ASPART 2 UNITS: 100 INJECTION, SOLUTION INTRAVENOUS; SUBCUTANEOUS at 11:02

## 2022-02-19 RX ADMIN — PANTOPRAZOLE SODIUM 40 MG: 40 TABLET, DELAYED RELEASE ORAL at 09:02

## 2022-02-19 RX ADMIN — ASPIRIN 81 MG: 81 TABLET, COATED ORAL at 09:02

## 2022-02-19 RX ADMIN — ATORVASTATIN CALCIUM 80 MG: 20 TABLET, FILM COATED ORAL at 09:02

## 2022-02-19 RX ADMIN — IPRATROPIUM BROMIDE AND ALBUTEROL SULFATE 3 ML: 2.5; .5 SOLUTION RESPIRATORY (INHALATION) at 02:02

## 2022-02-19 RX ADMIN — IPRATROPIUM BROMIDE AND ALBUTEROL SULFATE 3 ML: 2.5; .5 SOLUTION RESPIRATORY (INHALATION) at 08:02

## 2022-02-19 RX ADMIN — TIOTROPIUM BROMIDE INHALATION SPRAY 2 PUFF: 3.12 SPRAY, METERED RESPIRATORY (INHALATION) at 08:02

## 2022-02-19 RX ADMIN — MUPIROCIN: 20 OINTMENT TOPICAL at 01:02

## 2022-02-19 RX ADMIN — AMLODIPINE BESYLATE 10 MG: 10 TABLET ORAL at 09:02

## 2022-02-19 RX ADMIN — TICAGRELOR 90 MG: 90 TABLET ORAL at 09:02

## 2022-02-19 RX ADMIN — FUROSEMIDE 2.5 MG/HR: 10 INJECTION, SOLUTION INTRAVENOUS at 01:02

## 2022-02-19 RX ADMIN — IPRATROPIUM BROMIDE AND ALBUTEROL SULFATE 3 ML: 2.5; .5 SOLUTION RESPIRATORY (INHALATION) at 07:02

## 2022-02-19 RX ADMIN — METOPROLOL SUCCINATE 50 MG: 50 TABLET, EXTENDED RELEASE ORAL at 09:02

## 2022-02-19 RX ADMIN — Medication 6 MG: at 09:02

## 2022-02-19 RX ADMIN — FUROSEMIDE 40 MG: 40 TABLET ORAL at 09:02

## 2022-02-19 RX ADMIN — HYDROCODONE BITARTRATE AND ACETAMINOPHEN 1 TABLET: 5; 325 TABLET ORAL at 09:02

## 2022-02-19 RX ADMIN — FLUTICASONE FUROATE AND VILANTEROL TRIFENATATE 1 PUFF: 100; 25 POWDER RESPIRATORY (INHALATION) at 08:02

## 2022-02-19 NOTE — PROGRESS NOTES
[facility]  Cardiology  Progress Note    Patient Name: Marisol Kerr  MRN: [id number]  Admission Date: 2/11/2022  Hospital Length of Stay: 8 days  Code Status: Full Code   Attending Physician: Kaycee Mitchell MD   Primary Care Physician: Khadar Dwyer MD  Expected Discharge Date: 2/23/2022  Principal Problem:NSTEMI (non-ST elevated myocardial infarction)    Subjective:     Hospital Course:   Patient admitted to CCU while pending evaluation for ACS intervention, given her NSTEMI. Interventional Cardiology and Cardiothoracic Surgery consulted. Initiated guideline directed medical therapy for ACS. Nitroglycerin infusion for chest pain alleviation.  CTS evaluation did not recommend patient for CABG due to her debility as she is prohibitively high risk for CABG. Given contrast allergy, Interventional Cards started on prednisone 50 mg, famotidine 20, diphenhydramine 50 in anticipation of LHC. Patient had significant abdominal pain overnight and distended abdomen with mass. In light of this, IC deferred LHC. CT of the abdomen showed a large rectus sheath hematoma. Given patient's kidney function, CTA was deferred temporarily but the abdominal mass continued to enlarge. CTA of the abdomen showed extravasation of contrast into the rectus sheath hematoma. IR consulted and performed embolization of the inferior epigastric and collateral arteries. Patient's Cr elevated to 1.9 likely due to contrast. Started patient on slow NS infusion and will defer IC intervention until Cr returns to baseline or stabilizes. Patient's Hgb dropped requiring transfusion with aim >10 for IC intervention. Patient was given 2 pRBC and Hgb responded up to 9.2. IC recommended that since patient had a recent bleed, would defer procedure for outpatient in 1-2 weeks. Patient planned for SNF placement.      Interval History: No acute events overnight. Patient diuresed 3.3 L UOP overnight and feels symptomatically better.     Review  of Systems   Constitutional: Negative for decreased appetite, fever, weight gain and weight loss.   HENT:  Negative for congestion and sore throat.    Eyes:  Negative for blurred vision, vision loss in left eye, vision loss in right eye and visual disturbance.   Cardiovascular:  Negative for chest pain, leg swelling (at baseline) and palpitations.   Respiratory:  Negative for cough and shortness of breath.    Skin:  Negative for flushing and rash.   Gastrointestinal:  Negative for abdominal pain, constipation, diarrhea, heartburn, nausea and vomiting.   Genitourinary:  Negative for dysuria and flank pain.   Neurological:  Negative for focal weakness, headaches, light-headedness and weakness.   Psychiatric/Behavioral:  Negative for altered mental status and substance abuse. The patient is not nervous/anxious.    Allergic/Immunologic: Negative for environmental allergies and hives.   Objective:     Vital Signs (Most Recent):  Temp: 98.1 °F (36.7 °C) (02/19/22 0837)  Pulse: 80 (02/19/22 0852)  Resp: 18 (02/19/22 0933)  BP: (!) 132/57 (02/19/22 0837)  SpO2: 97 % (02/19/22 0852)   Vital Signs (24h Range):  Temp:  [98.1 °F (36.7 °C)-99.7 °F (37.6 °C)] 98.1 °F (36.7 °C)  Pulse:  [62-90] 80  Resp:  [16-20] 18  SpO2:  [96 %-97 %] 97 %  BP: (128-182)/(57-73) 132/57     Weight: 84.7 kg (186 lb 11.7 oz)  Body mass index is 33.08 kg/m².     SpO2: 97 %  O2 Device (Oxygen Therapy): nasal cannula      Intake/Output Summary (Last 24 hours) at 2/19/2022 0939  Last data filed at 2/19/2022 0936  Gross per 24 hour   Intake 630 ml   Output 4050 ml   Net -3420 ml       Lines/Drains/Airways       Drain  Duration             Female External Urinary Catheter 02/12/22 0700 7 days              Peripheral Intravenous Line  Duration                  Peripheral IV - Single Lumen 02/14/22 1500 18 G Left Breast 4 days         Peripheral IV - Single Lumen 02/14/22 1500 18 G Right Breast 4 days                    Physical Exam  Vitals and nursing  note reviewed.   Constitutional:       General: She is not in acute distress.     Appearance: Normal appearance. She is obese. She is not ill-appearing, toxic-appearing or diaphoretic.   HENT:      Head: Normocephalic and atraumatic.      Right Ear: External ear normal.      Left Ear: External ear normal.   Eyes:      General: No scleral icterus.        Right eye: No discharge.         Left eye: No discharge.      Extraocular Movements: Extraocular movements intact.      Conjunctiva/sclera: Conjunctivae normal.   Cardiovascular:      Rate and Rhythm: Normal rate and regular rhythm.      Pulses: Normal pulses.      Heart sounds: Normal heart sounds.   Pulmonary:      Effort: Pulmonary effort is normal. No respiratory distress.      Breath sounds: No wheezing or rales.   Chest:      Chest wall: No tenderness.   Abdominal:      General: There is no distension.      Palpations: There is mass.      Tenderness: There is no abdominal tenderness. There is no guarding.   Musculoskeletal:         General: No swelling or tenderness. Normal range of motion.      Cervical back: Normal range of motion and neck supple.      Right lower leg: Edema present.      Left lower leg: Edema present.   Skin:     General: Skin is warm and dry.      Capillary Refill: Capillary refill takes less than 2 seconds.      Findings: Erythema (lower extermity venous stasis) present.   Neurological:      General: No focal deficit present.      Mental Status: She is alert. Mental status is at baseline.   Psychiatric:         Mood and Affect: Mood normal.         Behavior: Behavior normal.       Significant Labs: CMP   Recent Labs   Lab 02/18/22  0442 02/19/22  0424    143   K 3.8 3.8    100   CO2 25 32*   GLU 82 123*   BUN 42* 34*   CREATININE 1.0 1.3   CALCIUM 8.4* 8.8   PROT 5.0* 5.8*   ALBUMIN 2.5* 2.9*   BILITOT 2.0* 2.3*   ALKPHOS 54* 74   AST 28 23   ALT 22 22   ANIONGAP 11 11   ESTGFRAFRICA >60.0 46.7*   EGFRNONAA 55.6* 40.5*   , CBC  "  Recent Labs   Lab 02/18/22  1007 02/18/22  1700 02/19/22  0424   WBC 12.95* 12.14 10.23   HGB 9.2* 9.9* 9.8*   HCT 29.0* 31.6* 31.6*   * 163 143*   , and All pertinent lab results from the last 24 hours have been reviewed.    Significant Imaging:  No new images    Assessment and Plan:     * NSTEMI (non-ST elevated myocardial infarction)  -Recent hx of increasing episodes of angina requiring more frequent Nitroglycerin use and underwent LHC at OSH.   -At OSH, Episode of 10/10 chest pain all over that she described as "burning", and "veins being ripped apart". Stat EKG at the time showed concerns for  lateral precordial ST depressions. She was initiated on nitroglycerin drip, heparin drip. One dose of  morphine 2mg & Lopressor 5mg IV were also administered. She reported alleviation of her chest pain after administration. Stable on Nitroglycerin drip. She was transferred to our hospital for further evaluation of her ACS and potential emergent intervention. LHC at outside hospital was notable for significant coronary artery disease:  Recent Left heart cath[02/11/22] showed:  · The RPAV lesion was 100% stenosed.  · The RPDA lesion was 100% stenosed.  · The Prox Cx to Mid Cx lesion was 80% stenosed.  · The Dist Cx lesion was 70% stenosed.  · The 1st LPL lesion was 90% stenosed.  · The Prox RCA-2 lesion was 70% stenosed.  · The Prox RCA-1 lesion was 90% stenosed.  · The Mid LAD-2 lesion was 60% stenosed.  · The estimated blood loss was <50 mL.  · There was three vessel coronary artery disease.    -ANUSHKA score: 6 points, 41% risk at 14 days of: all-cause mortality, new or recurrent MI, or severe recurrent ischemia requiring urgent revascularization.  -SYLVESTER Score: 136 points, 13 % probability of death from admission to 6 months      Plan:   - Continuous Telemetry monitoring  - continue 81 mg ASA qd  - Atorvastatin 80mg qhs  - Metoprolol 50 mg XL  - continue Ticagrelor 90mg bid  - patient has contrast allergy, will " start patient on prednisone 50 mg, famotidine 20 mg, diphenhydramine 50 when planned for LHC.   - LHC deferred given rectus sheath hematoma. Plan for IC intervention 1-2 weeks after admission for outpatient.          Acute on chronic diastolic congestive heart failure  CXR nonconcerning for pleural effusion/pulmonary edema, but concerns for CHF, given patient had recent increasing SOB, BNP elevation(580), and NSTEMI. TTE notable for Grade II left ventricular diastolic dysfunction.    Transthoracic echo (TTE) complete[02/12/22]  Summary  · The left ventricle is normal in size with normal systolic function.  · The estimated ejection fraction is 65%.  · There is a minor septal wall motion abnormality.  · Severe left atrial enlargement.  · Grade II left ventricular diastolic dysfunction.  · Normal right ventricular size with normal right ventricular systolic function.  · Normal central venous pressure (3 mmHg).      Plan:  - start diuresis with 80 mg lasix IV and followed by lasix 40 BID  - Cardiac diet with Fluid restriction at 1.5L with strict I/Os and daily STANDING weights  - Maintain on telemetry  - Check Electrolytes, keep Mag >2 & K+ >4  - Ambulate as tolerated . PT/OT consulted    CKD (chronic kidney disease), stage III  Baseline creatinine 1.5- 1.8   Creatinine uptrended from admission, but stable for now.   Recent Labs     02/16/22  0516 02/17/22  0428 02/18/22  0442   CREATININE 1.9* 1.4 1.0   BUN 59* 50* 42*     Estimated Creatinine Clearance: 52.5 mL/min (based on SCr of 1 mg/dL). according to latest data. Cr elevated to 1.9 likely due to SHIRA. Will hold off on LHC until Cr stabilizes.     PLAN  Monitor UOP and serial BMP and adjust therapy as needed.   Avoid nephrotoxic meds  Renally dose meds.        Gastroesophageal reflux disease without esophagitis  Stable.    PLAN  famotidine tablet 20 mg    DM type 2, controlled, with complication  Last A1c:   Lab Results   Component Value Date    HGBA1C 5.2  "01/27/2022      Blood Sugars (AccuCheck):    Recent Labs     02/12/22  1209 02/12/22  1727 02/12/22  2037 02/13/22  0748   POCTGLUCOSE 158* 158* 183* 155*         PLAN  -Low dose SSI  - Diabetic diet  - POCT glucose ACHS  - Will monitor glucose results and adjust insulin regimen accordingly      Essential hypertension, benign  BP was hypertensive at OSH. Stable on admission after initiation of home anti-hypertensives and  nitroglycerin drip .    Home medications:  amLODIPine (NORVASC) 5 MG, benazepriL (LOTENSIN) 40 MG ,     PLAN  metoprolol succinate (TOPROL-XL) 24 hr tablet 50 mg    amLODIPine tablet 10 mg    Hypercholesterolemia  Lab Results   Component Value Date    HDL 53 09/21/2021    LDLCALC 98.2 09/21/2021    CHOL 165 09/21/2021    TRIG 69 09/21/2021     PLAN  Atorvastatin 40mg    Coronary artery disease, multivessel with history of previous PCI  See "NSTEMI (non-ST elevated myocardial infarction)" A&P      Coronary atherosclerosis  See "NSTEMI (non-ST elevated myocardial infarction)" A&P    Rectus sheath hematoma  Patient had severe abdominal pain the day before with a mass on the abdomen. Patient was unable to have a BM yesterday. KUB was drawn and showed a large stool burden. Continued bowel regimen but pain was persistent. CT abdomen without contrast and lactic drawn. CT abdomen showed rectal sheath hematoma extending to the pelvis. IR consulted and recommended CTA of the abdomen to identify bleeding vessel. Due to kidney function, held off on CTA for concerns of contrast induced nephropathy as patient had a Alfredo score of 16 with 100cc of contrast, 15 with 75 cc contrast. IR consulted and following. Hgb went from 13 on admission --> 10.6 --> 9.9 --> 8.9. hematoma was expanding. IR performed embolization of inferior epigastric and collateral vessels.     Plan:  - continue to monitor abdominal mass    Venous stasis  Chronic history of venous stasis of bilateral lower extremities limited to breakdown of " skin without varicose veins  Patient denies increase in leg swelling over her baseline.    PLAN  Nursing wound care PRN  Elevate legs    COPD/emphysema  Known history of severe COPD (GOLD IV D PFT 2020). On home oxygen (2-3L)    Home medications: albuterol (VENTOLIN HFA) 90 mcg/actuation inhaler, ipratropium-albuteroL (COMBIVENT RESPIMAT)  mcg/actuation inhaler,  umeclidinium (INCRUSE ELLIPTA) 62.5 mcg/actuation inhalation capsule, fluticasone-salmeterol diskus inhaler 250-50 mcg      PLAN  VBG PRN  Supplementary oxygen via NC with titration to maintain  O2 sats >90%  Duonebs q6h while awake  Tiotropium inhaler            VTE Risk Mitigation (From admission, onward)         Ordered     Reason for No Pharmacological VTE Prophylaxis  Once        Question:  Reasons:  Answer:  Physician Provided (leave comment)    02/11/22 1446     IP VTE HIGH RISK PATIENT  Once         02/11/22 1446     Place sequential compression device  Until discontinued         02/11/22 1446                Becca Jacobson MD  Cardiology  [facility]

## 2022-02-19 NOTE — SUBJECTIVE & OBJECTIVE
Interval History: No acute events overnight. Patient diuresed 3.3 L UOP overnight and feels symptomatically better.     Review of Systems   Constitutional: Negative for decreased appetite, fever, weight gain and weight loss.   HENT:  Negative for congestion and sore throat.    Eyes:  Negative for blurred vision, vision loss in left eye, vision loss in right eye and visual disturbance.   Cardiovascular:  Negative for chest pain, leg swelling (at baseline) and palpitations.   Respiratory:  Negative for cough and shortness of breath.    Skin:  Negative for flushing and rash.   Gastrointestinal:  Negative for abdominal pain, constipation, diarrhea, heartburn, nausea and vomiting.   Genitourinary:  Negative for dysuria and flank pain.   Neurological:  Negative for focal weakness, headaches, light-headedness and weakness.   Psychiatric/Behavioral:  Negative for altered mental status and substance abuse. The patient is not nervous/anxious.    Allergic/Immunologic: Negative for environmental allergies and hives.   Objective:     Vital Signs (Most Recent):  Temp: 98.1 °F (36.7 °C) (02/19/22 0837)  Pulse: 80 (02/19/22 0852)  Resp: 18 (02/19/22 0933)  BP: (!) 132/57 (02/19/22 0837)  SpO2: 97 % (02/19/22 0852)   Vital Signs (24h Range):  Temp:  [98.1 °F (36.7 °C)-99.7 °F (37.6 °C)] 98.1 °F (36.7 °C)  Pulse:  [62-90] 80  Resp:  [16-20] 18  SpO2:  [96 %-97 %] 97 %  BP: (128-182)/(57-73) 132/57     Weight: 84.7 kg (186 lb 11.7 oz)  Body mass index is 33.08 kg/m².     SpO2: 97 %  O2 Device (Oxygen Therapy): nasal cannula      Intake/Output Summary (Last 24 hours) at 2/19/2022 0939  Last data filed at 2/19/2022 0936  Gross per 24 hour   Intake 630 ml   Output 4050 ml   Net -3420 ml       Lines/Drains/Airways       Drain  Duration             Female External Urinary Catheter 02/12/22 0700 7 days              Peripheral Intravenous Line  Duration                  Peripheral IV - Single Lumen 02/14/22 1500 18 G Left Breast 4 days          Peripheral IV - Single Lumen 02/14/22 1500 18 G Right Breast 4 days                    Physical Exam  Vitals and nursing note reviewed.   Constitutional:       General: She is not in acute distress.     Appearance: Normal appearance. She is obese. She is not ill-appearing, toxic-appearing or diaphoretic.   HENT:      Head: Normocephalic and atraumatic.      Right Ear: External ear normal.      Left Ear: External ear normal.   Eyes:      General: No scleral icterus.        Right eye: No discharge.         Left eye: No discharge.      Extraocular Movements: Extraocular movements intact.      Conjunctiva/sclera: Conjunctivae normal.   Cardiovascular:      Rate and Rhythm: Normal rate and regular rhythm.      Pulses: Normal pulses.      Heart sounds: Normal heart sounds.   Pulmonary:      Effort: Pulmonary effort is normal. No respiratory distress.      Breath sounds: No wheezing or rales.   Chest:      Chest wall: No tenderness.   Abdominal:      General: There is no distension.      Palpations: There is mass.      Tenderness: There is no abdominal tenderness. There is no guarding.   Musculoskeletal:         General: No swelling or tenderness. Normal range of motion.      Cervical back: Normal range of motion and neck supple.      Right lower leg: Edema present.      Left lower leg: Edema present.   Skin:     General: Skin is warm and dry.      Capillary Refill: Capillary refill takes less than 2 seconds.      Findings: Erythema (lower extermity venous stasis) present.   Neurological:      General: No focal deficit present.      Mental Status: She is alert. Mental status is at baseline.   Psychiatric:         Mood and Affect: Mood normal.         Behavior: Behavior normal.       Significant Labs: CMP   Recent Labs   Lab 02/18/22  0442 02/19/22  0424    143   K 3.8 3.8    100   CO2 25 32*   GLU 82 123*   BUN 42* 34*   CREATININE 1.0 1.3   CALCIUM 8.4* 8.8   PROT 5.0* 5.8*   ALBUMIN 2.5* 2.9*   BILITOT 2.0*  2.3*   ALKPHOS 54* 74   AST 28 23   ALT 22 22   ANIONGAP 11 11   ESTGFRAFRICA >60.0 46.7*   EGFRNONAA 55.6* 40.5*   , CBC   Recent Labs   Lab 02/18/22  1007 02/18/22  1700 02/19/22  0424   WBC 12.95* 12.14 10.23   HGB 9.2* 9.9* 9.8*   HCT 29.0* 31.6* 31.6*   * 163 143*   , and All pertinent lab results from the last 24 hours have been reviewed.    Significant Imaging:  No new images

## 2022-02-20 LAB
ALBUMIN SERPL BCP-MCNC: 2.9 G/DL (ref 3.5–5.2)
ALP SERPL-CCNC: 72 U/L (ref 55–135)
ALT SERPL W/O P-5'-P-CCNC: 20 U/L (ref 10–44)
ANION GAP SERPL CALC-SCNC: 13 MMOL/L (ref 8–16)
AST SERPL-CCNC: 19 U/L (ref 10–40)
BASOPHILS # BLD AUTO: 0.02 K/UL (ref 0–0.2)
BASOPHILS # BLD AUTO: 0.03 K/UL (ref 0–0.2)
BASOPHILS NFR BLD: 0.2 % (ref 0–1.9)
BASOPHILS NFR BLD: 0.2 % (ref 0–1.9)
BILIRUB SERPL-MCNC: 2.7 MG/DL (ref 0.1–1)
BUN SERPL-MCNC: 37 MG/DL (ref 8–23)
CALCIUM SERPL-MCNC: 9.2 MG/DL (ref 8.7–10.5)
CHLORIDE SERPL-SCNC: 93 MMOL/L (ref 95–110)
CO2 SERPL-SCNC: 36 MMOL/L (ref 23–29)
CREAT SERPL-MCNC: 1.4 MG/DL (ref 0.5–1.4)
DIFFERENTIAL METHOD: ABNORMAL
DIFFERENTIAL METHOD: ABNORMAL
EOSINOPHIL # BLD AUTO: 0.2 K/UL (ref 0–0.5)
EOSINOPHIL # BLD AUTO: 0.2 K/UL (ref 0–0.5)
EOSINOPHIL NFR BLD: 1 % (ref 0–8)
EOSINOPHIL NFR BLD: 1.4 % (ref 0–8)
ERYTHROCYTE [DISTWIDTH] IN BLOOD BY AUTOMATED COUNT: 14.3 % (ref 11.5–14.5)
ERYTHROCYTE [DISTWIDTH] IN BLOOD BY AUTOMATED COUNT: 14.3 % (ref 11.5–14.5)
EST. GFR  (AFRICAN AMERICAN): 42.7 ML/MIN/1.73 M^2
EST. GFR  (NON AFRICAN AMERICAN): 37 ML/MIN/1.73 M^2
GLUCOSE SERPL-MCNC: 129 MG/DL (ref 70–110)
HCT VFR BLD AUTO: 33.5 % (ref 37–48.5)
HCT VFR BLD AUTO: 35 % (ref 37–48.5)
HGB BLD-MCNC: 10.5 G/DL (ref 12–16)
HGB BLD-MCNC: 10.9 G/DL (ref 12–16)
IMM GRANULOCYTES # BLD AUTO: 0.13 K/UL (ref 0–0.04)
IMM GRANULOCYTES # BLD AUTO: 0.16 K/UL (ref 0–0.04)
IMM GRANULOCYTES NFR BLD AUTO: 1.1 % (ref 0–0.5)
IMM GRANULOCYTES NFR BLD AUTO: 1.1 % (ref 0–0.5)
LYMPHOCYTES # BLD AUTO: 0.9 K/UL (ref 1–4.8)
LYMPHOCYTES # BLD AUTO: 1 K/UL (ref 1–4.8)
LYMPHOCYTES NFR BLD: 6.8 % (ref 18–48)
LYMPHOCYTES NFR BLD: 7.3 % (ref 18–48)
MAGNESIUM SERPL-MCNC: 1.6 MG/DL (ref 1.6–2.6)
MCH RBC QN AUTO: 28.8 PG (ref 27–31)
MCH RBC QN AUTO: 29 PG (ref 27–31)
MCHC RBC AUTO-ENTMCNC: 31.1 G/DL (ref 32–36)
MCHC RBC AUTO-ENTMCNC: 31.3 G/DL (ref 32–36)
MCV RBC AUTO: 93 FL (ref 82–98)
MCV RBC AUTO: 93 FL (ref 82–98)
MONOCYTES # BLD AUTO: 1 K/UL (ref 0.3–1)
MONOCYTES # BLD AUTO: 1.2 K/UL (ref 0.3–1)
MONOCYTES NFR BLD: 8.2 % (ref 4–15)
MONOCYTES NFR BLD: 8.5 % (ref 4–15)
NEUTROPHILS # BLD AUTO: 11.8 K/UL (ref 1.8–7.7)
NEUTROPHILS # BLD AUTO: 9.8 K/UL (ref 1.8–7.7)
NEUTROPHILS NFR BLD: 81.8 % (ref 38–73)
NEUTROPHILS NFR BLD: 82.4 % (ref 38–73)
NRBC BLD-RTO: 0 /100 WBC
NRBC BLD-RTO: 0 /100 WBC
PHOSPHATE SERPL-MCNC: 2.8 MG/DL (ref 2.7–4.5)
PLATELET # BLD AUTO: 175 K/UL (ref 150–450)
PLATELET # BLD AUTO: 200 K/UL (ref 150–450)
PMV BLD AUTO: 12.2 FL (ref 9.2–12.9)
PMV BLD AUTO: 12.5 FL (ref 9.2–12.9)
POCT GLUCOSE: 130 MG/DL (ref 70–110)
POCT GLUCOSE: 133 MG/DL (ref 70–110)
POCT GLUCOSE: 163 MG/DL (ref 70–110)
POTASSIUM SERPL-SCNC: 4 MMOL/L (ref 3.5–5.1)
PROT SERPL-MCNC: 6.2 G/DL (ref 6–8.4)
RBC # BLD AUTO: 3.62 M/UL (ref 4–5.4)
RBC # BLD AUTO: 3.78 M/UL (ref 4–5.4)
SODIUM SERPL-SCNC: 142 MMOL/L (ref 136–145)
WBC # BLD AUTO: 12 K/UL (ref 3.9–12.7)
WBC # BLD AUTO: 14.32 K/UL (ref 3.9–12.7)

## 2022-02-20 PROCEDURE — 99900035 HC TECH TIME PER 15 MIN (STAT)

## 2022-02-20 PROCEDURE — 94760 N-INVAS EAR/PLS OXIMETRY 1: CPT

## 2022-02-20 PROCEDURE — 36415 COLL VENOUS BLD VENIPUNCTURE: CPT | Performed by: INTERNAL MEDICINE

## 2022-02-20 PROCEDURE — 20600001 HC STEP DOWN PRIVATE ROOM

## 2022-02-20 PROCEDURE — 25000242 PHARM REV CODE 250 ALT 637 W/ HCPCS: Performed by: STUDENT IN AN ORGANIZED HEALTH CARE EDUCATION/TRAINING PROGRAM

## 2022-02-20 PROCEDURE — 25000003 PHARM REV CODE 250: Performed by: INTERNAL MEDICINE

## 2022-02-20 PROCEDURE — 85025 COMPLETE CBC W/AUTO DIFF WBC: CPT | Performed by: INTERNAL MEDICINE

## 2022-02-20 PROCEDURE — 36415 COLL VENOUS BLD VENIPUNCTURE: CPT | Performed by: STUDENT IN AN ORGANIZED HEALTH CARE EDUCATION/TRAINING PROGRAM

## 2022-02-20 PROCEDURE — 94640 AIRWAY INHALATION TREATMENT: CPT

## 2022-02-20 PROCEDURE — 80053 COMPREHEN METABOLIC PANEL: CPT | Performed by: STUDENT IN AN ORGANIZED HEALTH CARE EDUCATION/TRAINING PROGRAM

## 2022-02-20 PROCEDURE — 25000003 PHARM REV CODE 250: Performed by: STUDENT IN AN ORGANIZED HEALTH CARE EDUCATION/TRAINING PROGRAM

## 2022-02-20 PROCEDURE — 99231 PR SUBSEQUENT HOSPITAL CARE,LEVL I: ICD-10-PCS | Mod: GC,,, | Performed by: INTERNAL MEDICINE

## 2022-02-20 PROCEDURE — 27000221 HC OXYGEN, UP TO 24 HOURS

## 2022-02-20 PROCEDURE — 84100 ASSAY OF PHOSPHORUS: CPT | Performed by: STUDENT IN AN ORGANIZED HEALTH CARE EDUCATION/TRAINING PROGRAM

## 2022-02-20 PROCEDURE — 25000003 PHARM REV CODE 250

## 2022-02-20 PROCEDURE — 94761 N-INVAS EAR/PLS OXIMETRY MLT: CPT

## 2022-02-20 PROCEDURE — 99231 SBSQ HOSP IP/OBS SF/LOW 25: CPT | Mod: GC,,, | Performed by: INTERNAL MEDICINE

## 2022-02-20 PROCEDURE — 83735 ASSAY OF MAGNESIUM: CPT | Performed by: STUDENT IN AN ORGANIZED HEALTH CARE EDUCATION/TRAINING PROGRAM

## 2022-02-20 RX ORDER — FUROSEMIDE 20 MG/1
20 TABLET ORAL 2 TIMES DAILY
Status: DISCONTINUED | OUTPATIENT
Start: 2022-02-20 | End: 2022-02-20

## 2022-02-20 RX ORDER — FUROSEMIDE 40 MG/1
40 TABLET ORAL 2 TIMES DAILY
Status: DISCONTINUED | OUTPATIENT
Start: 2022-02-20 | End: 2022-02-21 | Stop reason: HOSPADM

## 2022-02-20 RX ADMIN — IPRATROPIUM BROMIDE AND ALBUTEROL SULFATE 3 ML: 2.5; .5 SOLUTION RESPIRATORY (INHALATION) at 01:02

## 2022-02-20 RX ADMIN — MUPIROCIN: 20 OINTMENT TOPICAL at 09:02

## 2022-02-20 RX ADMIN — FUROSEMIDE 20 MG: 20 TABLET ORAL at 08:02

## 2022-02-20 RX ADMIN — IPRATROPIUM BROMIDE AND ALBUTEROL SULFATE 3 ML: 2.5; .5 SOLUTION RESPIRATORY (INHALATION) at 07:02

## 2022-02-20 RX ADMIN — MUPIROCIN: 20 OINTMENT TOPICAL at 10:02

## 2022-02-20 RX ADMIN — FUROSEMIDE 40 MG: 40 TABLET ORAL at 05:02

## 2022-02-20 RX ADMIN — TIOTROPIUM BROMIDE INHALATION SPRAY 2 PUFF: 3.12 SPRAY, METERED RESPIRATORY (INHALATION) at 08:02

## 2022-02-20 RX ADMIN — AMLODIPINE BESYLATE 10 MG: 10 TABLET ORAL at 08:02

## 2022-02-20 RX ADMIN — METOPROLOL SUCCINATE 50 MG: 50 TABLET, EXTENDED RELEASE ORAL at 08:02

## 2022-02-20 RX ADMIN — PANTOPRAZOLE SODIUM 40 MG: 40 TABLET, DELAYED RELEASE ORAL at 08:02

## 2022-02-20 RX ADMIN — ATORVASTATIN CALCIUM 80 MG: 20 TABLET, FILM COATED ORAL at 10:02

## 2022-02-20 RX ADMIN — ASPIRIN 81 MG: 81 TABLET, COATED ORAL at 08:02

## 2022-02-20 RX ADMIN — TICAGRELOR 90 MG: 90 TABLET ORAL at 10:02

## 2022-02-20 RX ADMIN — TICAGRELOR 90 MG: 90 TABLET ORAL at 08:02

## 2022-02-20 RX ADMIN — FLUTICASONE FUROATE AND VILANTEROL TRIFENATATE 1 PUFF: 100; 25 POWDER RESPIRATORY (INHALATION) at 08:02

## 2022-02-20 RX ADMIN — POLYETHYLENE GLYCOL 3350 17 G: 17 POWDER, FOR SOLUTION ORAL at 08:02

## 2022-02-20 NOTE — PLAN OF CARE
AAOX4,VSS,O2 sats>96% on 3L NC.Plan of care discussed with patient.Patient has no complaints of pain/SOB. Discussed medications and care. Patient has no questions at this time. Will continue to monitor through the shift.

## 2022-02-20 NOTE — ASSESSMENT & PLAN NOTE
CXR nonconcerning for pleural effusion/pulmonary edema, but concerns for CHF, given patient had recent increasing SOB, BNP elevation(580), and NSTEMI. TTE notable for Grade II left ventricular diastolic dysfunction.    Transthoracic echo (TTE) complete[02/12/22]  Summary  · The left ventricle is normal in size with normal systolic function.  · The estimated ejection fraction is 65%.  · There is a minor septal wall motion abnormality.  · Severe left atrial enlargement.  · Grade II left ventricular diastolic dysfunction.  · Normal right ventricular size with normal right ventricular systolic function.  · Normal central venous pressure (3 mmHg).      Plan:  - transition off lasix gtt to oral 40 BID  - Cardiac diet with Fluid restriction at 1.5L with strict I/Os and daily STANDING weights  - Maintain on telemetry  - Check Electrolytes, keep Mag >2 & K+ >4  - Ambulate as tolerated . PT/OT consulted

## 2022-02-20 NOTE — PROGRESS NOTES
[facility]  Cardiology  Progress Note    Patient Name: Marisol Kerr  MRN: [id number]  Admission Date: 2/11/2022  Hospital Length of Stay: 9 days  Code Status: Full Code   Attending Physician: Kaycee Mitchell MD   Primary Care Physician: Khadar Dwyer MD  Expected Discharge Date: 2/21/2022  Principal Problem:NSTEMI (non-ST elevated myocardial infarction)    Subjective:     Hospital Course:   Patient admitted to CCU while pending evaluation for ACS intervention, given her NSTEMI. Interventional Cardiology and Cardiothoracic Surgery consulted. Initiated guideline directed medical therapy for ACS. Nitroglycerin infusion for chest pain alleviation.  CTS evaluation did not recommend patient for CABG due to her debility as she is prohibitively high risk for CABG. Given contrast allergy, Interventional Cards started on prednisone 50 mg, famotidine 20, diphenhydramine 50 in anticipation of LHC. Patient had significant abdominal pain overnight and distended abdomen with mass. In light of this, IC deferred LHC. CT of the abdomen showed a large rectus sheath hematoma. Given patient's kidney function, CTA was deferred temporarily but the abdominal mass continued to enlarge. CTA of the abdomen showed extravasation of contrast into the rectus sheath hematoma. IR consulted and performed embolization of the inferior epigastric and collateral arteries. Patient's Cr elevated to 1.9 likely due to contrast. Started patient on slow NS infusion and will defer IC intervention until Cr returns to baseline or stabilizes. Patient's Hgb dropped requiring transfusion with aim >10 for IC intervention. Patient was given 2 pRBC and Hgb responded up to 9.2. IC recommended that since patient had a recent bleed, would defer procedure for outpatient in 1-2 weeks. Patient planned for SNF placement.      Interval History: No acute events overnight. Patient diuresed another 3 L UOP overnight.     Review of Systems    Constitutional: Negative for decreased appetite, fever, weight gain and weight loss.   HENT:  Negative for congestion and sore throat.    Eyes:  Negative for blurred vision, vision loss in left eye, vision loss in right eye and visual disturbance.   Cardiovascular:  Negative for chest pain, leg swelling (at baseline) and palpitations.   Respiratory:  Negative for cough and shortness of breath.    Skin:  Negative for flushing and rash.   Gastrointestinal:  Negative for abdominal pain, constipation, diarrhea, heartburn, nausea and vomiting.   Genitourinary:  Negative for dysuria and flank pain.   Neurological:  Negative for focal weakness, headaches, light-headedness and weakness.   Psychiatric/Behavioral:  Negative for altered mental status and substance abuse. The patient is not nervous/anxious.    Allergic/Immunologic: Negative for environmental allergies and hives.   Objective:     Vital Signs (Most Recent):  Temp: 96.9 °F (36.1 °C) (02/20/22 1157)  Pulse: 72 (02/20/22 1157)  Resp: 16 (02/20/22 1157)  BP: 126/63 (02/20/22 1157)  SpO2: 96 % (02/20/22 1157) Vital Signs (24h Range):  Temp:  [96.9 °F (36.1 °C)-100.3 °F (37.9 °C)] 96.9 °F (36.1 °C)  Pulse:  [66-98] 72  Resp:  [16-18] 16  SpO2:  [94 %-99 %] 96 %  BP: (126-187)/(61-77) 126/63     Weight: 82.6 kg (182 lb 1.6 oz)  Body mass index is 32.26 kg/m².     SpO2: 96 %  O2 Device (Oxygen Therapy): nasal cannula      Intake/Output Summary (Last 24 hours) at 2/20/2022 1237  Last data filed at 2/20/2022 0901  Gross per 24 hour   Intake 1007 ml   Output 3050 ml   Net -2043 ml       Lines/Drains/Airways       Drain  Duration             Female External Urinary Catheter 02/12/22 0700 8 days              Peripheral Intravenous Line  Duration                  Peripheral IV - Single Lumen 02/18/22 0500 20 G Posterior;Right Hand 2 days                    Physical Exam  Vitals and nursing note reviewed.   Constitutional:       General: She is not in acute distress.      Appearance: Normal appearance. She is obese. She is not ill-appearing, toxic-appearing or diaphoretic.   HENT:      Head: Normocephalic and atraumatic.      Right Ear: External ear normal.      Left Ear: External ear normal.   Eyes:      General: No scleral icterus.        Right eye: No discharge.         Left eye: No discharge.      Extraocular Movements: Extraocular movements intact.      Conjunctiva/sclera: Conjunctivae normal.   Cardiovascular:      Rate and Rhythm: Normal rate and regular rhythm.      Pulses: Normal pulses.      Heart sounds: Normal heart sounds.   Pulmonary:      Effort: Pulmonary effort is normal. No respiratory distress.      Breath sounds: No wheezing or rales.   Chest:      Chest wall: No tenderness.   Abdominal:      General: There is no distension.      Palpations: There is mass.      Tenderness: There is no abdominal tenderness. There is no guarding.   Musculoskeletal:         General: No swelling or tenderness. Normal range of motion.      Cervical back: Normal range of motion and neck supple.      Right lower leg: Edema present.      Left lower leg: Edema present.   Skin:     General: Skin is warm and dry.      Capillary Refill: Capillary refill takes less than 2 seconds.      Findings: Erythema (lower extermity venous stasis) present.   Neurological:      General: No focal deficit present.      Mental Status: She is alert. Mental status is at baseline.   Psychiatric:         Mood and Affect: Mood normal.         Behavior: Behavior normal.       Significant Labs: CMP   Recent Labs   Lab 02/19/22  0424 02/20/22  0357    142   K 3.8 4.0    93*   CO2 32* 36*   * 129*   BUN 34* 37*   CREATININE 1.3 1.4   CALCIUM 8.8 9.2   PROT 5.8* 6.2   ALBUMIN 2.9* 2.9*   BILITOT 2.3* 2.7*   ALKPHOS 74 72   AST 23 19   ALT 22 20   ANIONGAP 11 13   ESTGFRAFRICA 46.7* 42.7*   EGFRNONAA 40.5* 37.0*   , CBC   Recent Labs   Lab 02/19/22  0424 02/19/22  1647 02/20/22  0357   WBC 10.23 11.58  "12.00   HGB 9.8* 10.8* 10.5*   HCT 31.6* 34.3* 33.5*   * 168 175   , and All pertinent lab results from the last 24 hours have been reviewed.    Significant Imaging:  no new imaging    Assessment and Plan:       * NSTEMI (non-ST elevated myocardial infarction)  -Recent hx of increasing episodes of angina requiring more frequent Nitroglycerin use and underwent LHC at OSH.   -At OSH, Episode of 10/10 chest pain all over that she described as "burning", and "veins being ripped apart". Stat EKG at the time showed concerns for  lateral precordial ST depressions. She was initiated on nitroglycerin drip, heparin drip. One dose of  morphine 2mg & Lopressor 5mg IV were also administered. She reported alleviation of her chest pain after administration. Stable on Nitroglycerin drip. She was transferred to our hospital for further evaluation of her ACS and potential emergent intervention. LHC at outside hospital was notable for significant coronary artery disease:  Recent Left heart cath[02/11/22] showed:  · The RPAV lesion was 100% stenosed.  · The RPDA lesion was 100% stenosed.  · The Prox Cx to Mid Cx lesion was 80% stenosed.  · The Dist Cx lesion was 70% stenosed.  · The 1st LPL lesion was 90% stenosed.  · The Prox RCA-2 lesion was 70% stenosed.  · The Prox RCA-1 lesion was 90% stenosed.  · The Mid LAD-2 lesion was 60% stenosed.  · The estimated blood loss was <50 mL.  · There was three vessel coronary artery disease.    -ANUSHKA score: 6 points, 41% risk at 14 days of: all-cause mortality, new or recurrent MI, or severe recurrent ischemia requiring urgent revascularization.  -SYLVESTER Score: 136 points, 13 % probability of death from admission to 6 months      Plan:   - Continuous Telemetry monitoring  - continue 81 mg ASA qd  - Atorvastatin 80mg qhs  - Metoprolol 50 mg XL  - continue Ticagrelor 90mg bid  - patient has contrast allergy, will start patient on prednisone 50 mg, famotidine 20 mg, diphenhydramine 50 when " planned for LHC.   - LHC deferred given rectus sheath hematoma. Plan for IC intervention 1-2 weeks after admission for outpatient.          Acute on chronic diastolic congestive heart failure  CXR nonconcerning for pleural effusion/pulmonary edema, but concerns for CHF, given patient had recent increasing SOB, BNP elevation(580), and NSTEMI. TTE notable for Grade II left ventricular diastolic dysfunction.    Transthoracic echo (TTE) complete[02/12/22]  Summary  · The left ventricle is normal in size with normal systolic function.  · The estimated ejection fraction is 65%.  · There is a minor septal wall motion abnormality.  · Severe left atrial enlargement.  · Grade II left ventricular diastolic dysfunction.  · Normal right ventricular size with normal right ventricular systolic function.  · Normal central venous pressure (3 mmHg).      Plan:  - transition off lasix gtt to oral 40 BID  - Cardiac diet with Fluid restriction at 1.5L with strict I/Os and daily STANDING weights  - Maintain on telemetry  - Check Electrolytes, keep Mag >2 & K+ >4  - Ambulate as tolerated . PT/OT consulted    CKD (chronic kidney disease), stage III  Baseline creatinine 1.5- 1.8   Creatinine uptrended from admission, but stable for now.   Recent Labs     02/16/22  0516 02/17/22  0428 02/18/22  0442   CREATININE 1.9* 1.4 1.0   BUN 59* 50* 42*     Estimated Creatinine Clearance: 52.5 mL/min (based on SCr of 1 mg/dL). according to latest data. Cr elevated to 1.9 likely due to SHIRA. Will hold off on LHC until Cr stabilizes.     PLAN  Monitor UOP and serial BMP and adjust therapy as needed.   Avoid nephrotoxic meds  Renally dose meds.        Gastroesophageal reflux disease without esophagitis  Stable.    PLAN  famotidine tablet 20 mg    DM type 2, controlled, with complication  Last A1c:   Lab Results   Component Value Date    HGBA1C 5.2 01/27/2022      Blood Sugars (AccuCheck):    Recent Labs     02/12/22  1209 02/12/22  1727 02/12/22 2037  "02/13/22  0748   POCTGLUCOSE 158* 158* 183* 155*         PLAN  -Low dose SSI  - Diabetic diet  - POCT glucose ACHS  - Will monitor glucose results and adjust insulin regimen accordingly      Essential hypertension, benign  BP was hypertensive at OSH. Stable on admission after initiation of home anti-hypertensives and  nitroglycerin drip .    Home medications:  amLODIPine (NORVASC) 5 MG, benazepriL (LOTENSIN) 40 MG ,     PLAN  metoprolol succinate (TOPROL-XL) 24 hr tablet 50 mg    amLODIPine tablet 10 mg    Hypercholesterolemia  Lab Results   Component Value Date    HDL 53 09/21/2021    LDLCALC 98.2 09/21/2021    CHOL 165 09/21/2021    TRIG 69 09/21/2021     PLAN  Atorvastatin 40mg    Coronary artery disease, multivessel with history of previous PCI  See "NSTEMI (non-ST elevated myocardial infarction)" A&P      Coronary atherosclerosis  See "NSTEMI (non-ST elevated myocardial infarction)" A&P    Rectus sheath hematoma  Patient had severe abdominal pain the day before with a mass on the abdomen. Patient was unable to have a BM yesterday. KUB was drawn and showed a large stool burden. Continued bowel regimen but pain was persistent. CT abdomen without contrast and lactic drawn. CT abdomen showed rectal sheath hematoma extending to the pelvis. IR consulted and recommended CTA of the abdomen to identify bleeding vessel. Due to kidney function, held off on CTA for concerns of contrast induced nephropathy as patient had a Alfredo score of 16 with 100cc of contrast, 15 with 75 cc contrast. IR consulted and following. Hgb went from 13 on admission --> 10.6 --> 9.9 --> 8.9. hematoma was expanding. IR performed embolization of inferior epigastric and collateral vessels.     Plan:  - continue to monitor abdominal mass    Venous stasis  Chronic history of venous stasis of bilateral lower extremities limited to breakdown of skin without varicose veins  Patient denies increase in leg swelling over her baseline.    PLAN  Nursing " wound care PRN  Elevate legs    COPD/emphysema  Known history of severe COPD (GOLD IV D PFT 2020). On home oxygen (2-3L)    Home medications: albuterol (VENTOLIN HFA) 90 mcg/actuation inhaler, ipratropium-albuteroL (COMBIVENT RESPIMAT)  mcg/actuation inhaler,  umeclidinium (INCRUSE ELLIPTA) 62.5 mcg/actuation inhalation capsule, fluticasone-salmeterol diskus inhaler 250-50 mcg      PLAN  VBG PRN  Supplementary oxygen via NC with titration to maintain  O2 sats >90%  Duonebs q6h while awake  Tiotropium inhaler            VTE Risk Mitigation (From admission, onward)         Ordered     Reason for No Pharmacological VTE Prophylaxis  Once        Question:  Reasons:  Answer:  Physician Provided (leave comment)    02/11/22 1446     IP VTE HIGH RISK PATIENT  Once         02/11/22 1446     Place sequential compression device  Until discontinued         02/11/22 1446                Becca Jacobson MD  Cardiology  [facility]

## 2022-02-20 NOTE — SUBJECTIVE & OBJECTIVE
Interval History: No acute events overnight. Patient diuresed another 3 L UOP overnight.     Review of Systems   Constitutional: Negative for decreased appetite, fever, weight gain and weight loss.   HENT:  Negative for congestion and sore throat.    Eyes:  Negative for blurred vision, vision loss in left eye, vision loss in right eye and visual disturbance.   Cardiovascular:  Negative for chest pain, leg swelling (at baseline) and palpitations.   Respiratory:  Negative for cough and shortness of breath.    Skin:  Negative for flushing and rash.   Gastrointestinal:  Negative for abdominal pain, constipation, diarrhea, heartburn, nausea and vomiting.   Genitourinary:  Negative for dysuria and flank pain.   Neurological:  Negative for focal weakness, headaches, light-headedness and weakness.   Psychiatric/Behavioral:  Negative for altered mental status and substance abuse. The patient is not nervous/anxious.    Allergic/Immunologic: Negative for environmental allergies and hives.   Objective:     Vital Signs (Most Recent):  Temp: 96.9 °F (36.1 °C) (02/20/22 1157)  Pulse: 72 (02/20/22 1157)  Resp: 16 (02/20/22 1157)  BP: 126/63 (02/20/22 1157)  SpO2: 96 % (02/20/22 1157) Vital Signs (24h Range):  Temp:  [96.9 °F (36.1 °C)-100.3 °F (37.9 °C)] 96.9 °F (36.1 °C)  Pulse:  [66-98] 72  Resp:  [16-18] 16  SpO2:  [94 %-99 %] 96 %  BP: (126-187)/(61-77) 126/63     Weight: 82.6 kg (182 lb 1.6 oz)  Body mass index is 32.26 kg/m².     SpO2: 96 %  O2 Device (Oxygen Therapy): nasal cannula      Intake/Output Summary (Last 24 hours) at 2/20/2022 1237  Last data filed at 2/20/2022 0901  Gross per 24 hour   Intake 1007 ml   Output 3050 ml   Net -2043 ml       Lines/Drains/Airways       Drain  Duration             Female External Urinary Catheter 02/12/22 0700 8 days              Peripheral Intravenous Line  Duration                  Peripheral IV - Single Lumen 02/18/22 0500 20 G Posterior;Right Hand 2 days                    Physical  Exam  Vitals and nursing note reviewed.   Constitutional:       General: She is not in acute distress.     Appearance: Normal appearance. She is obese. She is not ill-appearing, toxic-appearing or diaphoretic.   HENT:      Head: Normocephalic and atraumatic.      Right Ear: External ear normal.      Left Ear: External ear normal.   Eyes:      General: No scleral icterus.        Right eye: No discharge.         Left eye: No discharge.      Extraocular Movements: Extraocular movements intact.      Conjunctiva/sclera: Conjunctivae normal.   Cardiovascular:      Rate and Rhythm: Normal rate and regular rhythm.      Pulses: Normal pulses.      Heart sounds: Normal heart sounds.   Pulmonary:      Effort: Pulmonary effort is normal. No respiratory distress.      Breath sounds: No wheezing or rales.   Chest:      Chest wall: No tenderness.   Abdominal:      General: There is no distension.      Palpations: There is mass.      Tenderness: There is no abdominal tenderness. There is no guarding.   Musculoskeletal:         General: No swelling or tenderness. Normal range of motion.      Cervical back: Normal range of motion and neck supple.      Right lower leg: Edema present.      Left lower leg: Edema present.   Skin:     General: Skin is warm and dry.      Capillary Refill: Capillary refill takes less than 2 seconds.      Findings: Erythema (lower extermity venous stasis) present.   Neurological:      General: No focal deficit present.      Mental Status: She is alert. Mental status is at baseline.   Psychiatric:         Mood and Affect: Mood normal.         Behavior: Behavior normal.       Significant Labs: CMP   Recent Labs   Lab 02/19/22  0424 02/20/22  0357    142   K 3.8 4.0    93*   CO2 32* 36*   * 129*   BUN 34* 37*   CREATININE 1.3 1.4   CALCIUM 8.8 9.2   PROT 5.8* 6.2   ALBUMIN 2.9* 2.9*   BILITOT 2.3* 2.7*   ALKPHOS 74 72   AST 23 19   ALT 22 20   ANIONGAP 11 13   ESTGFRAFRICA 46.7* 42.7*    EGFRNONAA 40.5* 37.0*   , CBC   Recent Labs   Lab 02/19/22  0424 02/19/22  1647 02/20/22  0357   WBC 10.23 11.58 12.00   HGB 9.8* 10.8* 10.5*   HCT 31.6* 34.3* 33.5*   * 168 175   , and All pertinent lab results from the last 24 hours have been reviewed.    Significant Imaging:  no new imaging

## 2022-02-21 ENCOUNTER — HOSPITAL ENCOUNTER (INPATIENT)
Facility: HOSPITAL | Age: 77
LOS: 22 days | Discharge: ANOTHER HOSPITAL | DRG: 280 | End: 2022-03-15
Attending: HOSPITALIST | Admitting: HOSPITALIST
Payer: MEDICARE

## 2022-02-21 VITALS
HEART RATE: 81 BPM | RESPIRATION RATE: 18 BRPM | BODY MASS INDEX: 32.38 KG/M2 | HEIGHT: 63 IN | OXYGEN SATURATION: 95 % | DIASTOLIC BLOOD PRESSURE: 62 MMHG | WEIGHT: 182.75 LBS | SYSTOLIC BLOOD PRESSURE: 136 MMHG | TEMPERATURE: 98 F

## 2022-02-21 DIAGNOSIS — I21.4 NSTEMI (NON-ST ELEVATED MYOCARDIAL INFARCTION): ICD-10-CM

## 2022-02-21 LAB
ALBUMIN SERPL BCP-MCNC: 2.7 G/DL (ref 3.5–5.2)
ALP SERPL-CCNC: 64 U/L (ref 55–135)
ALT SERPL W/O P-5'-P-CCNC: 18 U/L (ref 10–44)
ANION GAP SERPL CALC-SCNC: 15 MMOL/L (ref 8–16)
AST SERPL-CCNC: 20 U/L (ref 10–40)
BASOPHILS # BLD AUTO: 0.03 K/UL (ref 0–0.2)
BASOPHILS # BLD AUTO: 0.03 K/UL (ref 0–0.2)
BASOPHILS NFR BLD: 0.2 % (ref 0–1.9)
BASOPHILS NFR BLD: 0.2 % (ref 0–1.9)
BILIRUB SERPL-MCNC: 2.5 MG/DL (ref 0.1–1)
BUN SERPL-MCNC: 51 MG/DL (ref 8–23)
CALCIUM SERPL-MCNC: 8.7 MG/DL (ref 8.7–10.5)
CHLORIDE SERPL-SCNC: 90 MMOL/L (ref 95–110)
CO2 SERPL-SCNC: 31 MMOL/L (ref 23–29)
CREAT SERPL-MCNC: 1.6 MG/DL (ref 0.5–1.4)
DIFFERENTIAL METHOD: ABNORMAL
DIFFERENTIAL METHOD: ABNORMAL
EOSINOPHIL # BLD AUTO: 0.1 K/UL (ref 0–0.5)
EOSINOPHIL # BLD AUTO: 0.2 K/UL (ref 0–0.5)
EOSINOPHIL NFR BLD: 0.9 % (ref 0–8)
EOSINOPHIL NFR BLD: 1.3 % (ref 0–8)
ERYTHROCYTE [DISTWIDTH] IN BLOOD BY AUTOMATED COUNT: 14.2 % (ref 11.5–14.5)
ERYTHROCYTE [DISTWIDTH] IN BLOOD BY AUTOMATED COUNT: 14.3 % (ref 11.5–14.5)
EST. GFR  (AFRICAN AMERICAN): 36.3 ML/MIN/1.73 M^2
EST. GFR  (NON AFRICAN AMERICAN): 31.5 ML/MIN/1.73 M^2
GLUCOSE SERPL-MCNC: 105 MG/DL (ref 70–110)
HCT VFR BLD AUTO: 33.7 % (ref 37–48.5)
HCT VFR BLD AUTO: 35.2 % (ref 37–48.5)
HGB BLD-MCNC: 10.5 G/DL (ref 12–16)
HGB BLD-MCNC: 11.2 G/DL (ref 12–16)
IMM GRANULOCYTES # BLD AUTO: 0.16 K/UL (ref 0–0.04)
IMM GRANULOCYTES # BLD AUTO: 0.19 K/UL (ref 0–0.04)
IMM GRANULOCYTES NFR BLD AUTO: 1.1 % (ref 0–0.5)
IMM GRANULOCYTES NFR BLD AUTO: 1.3 % (ref 0–0.5)
LYMPHOCYTES # BLD AUTO: 0.8 K/UL (ref 1–4.8)
LYMPHOCYTES # BLD AUTO: 0.8 K/UL (ref 1–4.8)
LYMPHOCYTES NFR BLD: 5.7 % (ref 18–48)
LYMPHOCYTES NFR BLD: 5.9 % (ref 18–48)
MAGNESIUM SERPL-MCNC: 1.4 MG/DL (ref 1.6–2.6)
MCH RBC QN AUTO: 28.6 PG (ref 27–31)
MCH RBC QN AUTO: 28.9 PG (ref 27–31)
MCHC RBC AUTO-ENTMCNC: 31.2 G/DL (ref 32–36)
MCHC RBC AUTO-ENTMCNC: 31.8 G/DL (ref 32–36)
MCV RBC AUTO: 91 FL (ref 82–98)
MCV RBC AUTO: 92 FL (ref 82–98)
MONOCYTES # BLD AUTO: 1 K/UL (ref 0.3–1)
MONOCYTES # BLD AUTO: 1.3 K/UL (ref 0.3–1)
MONOCYTES NFR BLD: 7 % (ref 4–15)
MONOCYTES NFR BLD: 9 % (ref 4–15)
NEUTROPHILS # BLD AUTO: 11.8 K/UL (ref 1.8–7.7)
NEUTROPHILS # BLD AUTO: 11.9 K/UL (ref 1.8–7.7)
NEUTROPHILS NFR BLD: 82.3 % (ref 38–73)
NEUTROPHILS NFR BLD: 85.1 % (ref 38–73)
NRBC BLD-RTO: 0 /100 WBC
NRBC BLD-RTO: 0 /100 WBC
PHOSPHATE SERPL-MCNC: 2.8 MG/DL (ref 2.7–4.5)
PLATELET # BLD AUTO: 169 K/UL (ref 150–450)
PLATELET # BLD AUTO: 217 K/UL (ref 150–450)
PMV BLD AUTO: 12 FL (ref 9.2–12.9)
PMV BLD AUTO: 12.7 FL (ref 9.2–12.9)
POCT GLUCOSE: 101 MG/DL (ref 70–110)
POCT GLUCOSE: 121 MG/DL (ref 70–110)
POCT GLUCOSE: 141 MG/DL (ref 70–110)
POCT GLUCOSE: 156 MG/DL (ref 70–110)
POTASSIUM SERPL-SCNC: 3.1 MMOL/L (ref 3.5–5.1)
PROT SERPL-MCNC: 5.9 G/DL (ref 6–8.4)
RBC # BLD AUTO: 3.67 M/UL (ref 4–5.4)
RBC # BLD AUTO: 3.88 M/UL (ref 4–5.4)
SARS-COV-2 RNA RESP QL NAA+PROBE: NOT DETECTED
SODIUM SERPL-SCNC: 136 MMOL/L (ref 136–145)
WBC # BLD AUTO: 13.94 K/UL (ref 3.9–12.7)
WBC # BLD AUTO: 14.28 K/UL (ref 3.9–12.7)

## 2022-02-21 PROCEDURE — 36415 COLL VENOUS BLD VENIPUNCTURE: CPT | Performed by: INTERNAL MEDICINE

## 2022-02-21 PROCEDURE — 36415 COLL VENOUS BLD VENIPUNCTURE: CPT | Performed by: STUDENT IN AN ORGANIZED HEALTH CARE EDUCATION/TRAINING PROGRAM

## 2022-02-21 PROCEDURE — 99239 PR HOSPITAL DISCHARGE DAY,>30 MIN: ICD-10-PCS | Mod: GC,,, | Performed by: INTERNAL MEDICINE

## 2022-02-21 PROCEDURE — 94761 N-INVAS EAR/PLS OXIMETRY MLT: CPT

## 2022-02-21 PROCEDURE — U0003 INFECTIOUS AGENT DETECTION BY NUCLEIC ACID (DNA OR RNA); SEVERE ACUTE RESPIRATORY SYNDROME CORONAVIRUS 2 (SARS-COV-2) (CORONAVIRUS DISEASE [COVID-19]), AMPLIFIED PROBE TECHNIQUE, MAKING USE OF HIGH THROUGHPUT TECHNOLOGIES AS DESCRIBED BY CMS-2020-01-R: HCPCS

## 2022-02-21 PROCEDURE — 85025 COMPLETE CBC W/AUTO DIFF WBC: CPT | Mod: 91 | Performed by: INTERNAL MEDICINE

## 2022-02-21 PROCEDURE — 63600175 PHARM REV CODE 636 W HCPCS

## 2022-02-21 PROCEDURE — 99239 HOSP IP/OBS DSCHRG MGMT >30: CPT | Mod: GC,,, | Performed by: INTERNAL MEDICINE

## 2022-02-21 PROCEDURE — 25000242 PHARM REV CODE 250 ALT 637 W/ HCPCS: Performed by: STUDENT IN AN ORGANIZED HEALTH CARE EDUCATION/TRAINING PROGRAM

## 2022-02-21 PROCEDURE — 27000221 HC OXYGEN, UP TO 24 HOURS

## 2022-02-21 PROCEDURE — 25000003 PHARM REV CODE 250: Performed by: INTERNAL MEDICINE

## 2022-02-21 PROCEDURE — 25000003 PHARM REV CODE 250

## 2022-02-21 PROCEDURE — 94640 AIRWAY INHALATION TREATMENT: CPT

## 2022-02-21 PROCEDURE — 84100 ASSAY OF PHOSPHORUS: CPT | Performed by: STUDENT IN AN ORGANIZED HEALTH CARE EDUCATION/TRAINING PROGRAM

## 2022-02-21 PROCEDURE — 97110 THERAPEUTIC EXERCISES: CPT

## 2022-02-21 PROCEDURE — 97116 GAIT TRAINING THERAPY: CPT

## 2022-02-21 PROCEDURE — U0005 INFEC AGEN DETEC AMPLI PROBE: HCPCS

## 2022-02-21 PROCEDURE — 25000003 PHARM REV CODE 250: Performed by: STUDENT IN AN ORGANIZED HEALTH CARE EDUCATION/TRAINING PROGRAM

## 2022-02-21 PROCEDURE — 80053 COMPREHEN METABOLIC PANEL: CPT | Performed by: STUDENT IN AN ORGANIZED HEALTH CARE EDUCATION/TRAINING PROGRAM

## 2022-02-21 PROCEDURE — 25000003 PHARM REV CODE 250: Performed by: HOSPITALIST

## 2022-02-21 PROCEDURE — 11000004 HC SNF PRIVATE

## 2022-02-21 PROCEDURE — 99900035 HC TECH TIME PER 15 MIN (STAT)

## 2022-02-21 PROCEDURE — 83735 ASSAY OF MAGNESIUM: CPT | Performed by: STUDENT IN AN ORGANIZED HEALTH CARE EDUCATION/TRAINING PROGRAM

## 2022-02-21 RX ORDER — AMLODIPINE BESYLATE 10 MG/1
10 TABLET ORAL DAILY
Status: DISCONTINUED | OUTPATIENT
Start: 2022-02-22 | End: 2022-03-16 | Stop reason: HOSPADM

## 2022-02-21 RX ORDER — LISINOPRIL 10 MG/1
10 TABLET ORAL DAILY
Status: DISCONTINUED | OUTPATIENT
Start: 2022-02-22 | End: 2022-02-24

## 2022-02-21 RX ORDER — BROMFENAC 1.03 MG/ML
1 SOLUTION/ DROPS OPHTHALMIC DAILY PRN
Status: DISCONTINUED | OUTPATIENT
Start: 2022-02-21 | End: 2022-03-16 | Stop reason: HOSPADM

## 2022-02-21 RX ORDER — ACETAMINOPHEN 325 MG/1
650 TABLET ORAL EVERY 4 HOURS PRN
Refills: 0
Start: 2022-02-21 | End: 2022-10-31

## 2022-02-21 RX ORDER — MAGNESIUM SULFATE HEPTAHYDRATE 40 MG/ML
2 INJECTION, SOLUTION INTRAVENOUS ONCE
Status: COMPLETED | OUTPATIENT
Start: 2022-02-21 | End: 2022-02-21

## 2022-02-21 RX ORDER — FUROSEMIDE 40 MG/1
40 TABLET ORAL DAILY
Status: DISCONTINUED | OUTPATIENT
Start: 2022-02-22 | End: 2022-02-24

## 2022-02-21 RX ORDER — TRIAMCINOLONE ACETONIDE 1 MG/G
CREAM TOPICAL 2 TIMES DAILY
Status: DISCONTINUED | OUTPATIENT
Start: 2022-02-21 | End: 2022-03-16 | Stop reason: HOSPADM

## 2022-02-21 RX ORDER — TALC
6 POWDER (GRAM) TOPICAL NIGHTLY PRN
Status: DISCONTINUED | OUTPATIENT
Start: 2022-02-21 | End: 2022-03-16 | Stop reason: HOSPADM

## 2022-02-21 RX ORDER — NITROGLYCERIN 0.4 MG/1
0.4 TABLET SUBLINGUAL EVERY 5 MIN PRN
Status: DISCONTINUED | OUTPATIENT
Start: 2022-02-21 | End: 2022-03-16 | Stop reason: HOSPADM

## 2022-02-21 RX ORDER — NAPROXEN SODIUM 220 MG/1
81 TABLET, FILM COATED ORAL DAILY
Status: DISCONTINUED | OUTPATIENT
Start: 2022-02-22 | End: 2022-03-16 | Stop reason: HOSPADM

## 2022-02-21 RX ORDER — AMOXICILLIN 250 MG
1 CAPSULE ORAL 2 TIMES DAILY
Status: DISCONTINUED | OUTPATIENT
Start: 2022-02-21 | End: 2022-02-24

## 2022-02-21 RX ORDER — ATORVASTATIN CALCIUM 80 MG/1
80 TABLET, FILM COATED ORAL NIGHTLY
Qty: 90 TABLET | Refills: 3 | Status: ON HOLD | OUTPATIENT
Start: 2022-02-21 | End: 2022-03-31 | Stop reason: HOSPADM

## 2022-02-21 RX ORDER — CALCIUM CARBONATE 200(500)MG
500 TABLET,CHEWABLE ORAL 2 TIMES DAILY PRN
Status: DISCONTINUED | OUTPATIENT
Start: 2022-02-21 | End: 2022-03-16 | Stop reason: HOSPADM

## 2022-02-21 RX ORDER — METOPROLOL SUCCINATE 50 MG/1
50 TABLET, EXTENDED RELEASE ORAL DAILY
Status: DISCONTINUED | OUTPATIENT
Start: 2022-02-22 | End: 2022-03-16 | Stop reason: HOSPADM

## 2022-02-21 RX ORDER — PANTOPRAZOLE SODIUM 40 MG/1
40 TABLET, DELAYED RELEASE ORAL DAILY
Status: DISCONTINUED | OUTPATIENT
Start: 2022-02-22 | End: 2022-03-16 | Stop reason: HOSPADM

## 2022-02-21 RX ORDER — AMLODIPINE BESYLATE 10 MG/1
10 TABLET ORAL DAILY
Qty: 30 TABLET | Refills: 11 | Status: ON HOLD | OUTPATIENT
Start: 2022-02-21 | End: 2022-04-22 | Stop reason: HOSPADM

## 2022-02-21 RX ORDER — FLUTICASONE FUROATE AND VILANTEROL 100; 25 UG/1; UG/1
1 POWDER RESPIRATORY (INHALATION) DAILY
Status: DISCONTINUED | OUTPATIENT
Start: 2022-02-22 | End: 2022-03-16 | Stop reason: HOSPADM

## 2022-02-21 RX ORDER — ACETAMINOPHEN 325 MG/1
650 TABLET ORAL EVERY 6 HOURS PRN
Status: DISCONTINUED | OUTPATIENT
Start: 2022-02-21 | End: 2022-03-16 | Stop reason: HOSPADM

## 2022-02-21 RX ORDER — EPINEPHRINE 0.22MG
100 AEROSOL WITH ADAPTER (ML) INHALATION DAILY
Status: DISCONTINUED | OUTPATIENT
Start: 2022-02-22 | End: 2022-03-16 | Stop reason: HOSPADM

## 2022-02-21 RX ORDER — METOPROLOL SUCCINATE 50 MG/1
50 TABLET, EXTENDED RELEASE ORAL DAILY
Qty: 45 TABLET | Refills: 1
Start: 2022-02-21 | End: 2022-07-12 | Stop reason: SDUPTHER

## 2022-02-21 RX ORDER — HYDROCODONE BITARTRATE AND ACETAMINOPHEN 5; 325 MG/1; MG/1
1 TABLET ORAL EVERY 4 HOURS PRN
Status: DISCONTINUED | OUTPATIENT
Start: 2022-02-22 | End: 2022-03-16 | Stop reason: HOSPADM

## 2022-02-21 RX ORDER — ATORVASTATIN CALCIUM 20 MG/1
80 TABLET, FILM COATED ORAL NIGHTLY
Status: DISCONTINUED | OUTPATIENT
Start: 2022-02-21 | End: 2022-03-16 | Stop reason: HOSPADM

## 2022-02-21 RX ORDER — ALBUTEROL SULFATE 90 UG/1
2 AEROSOL, METERED RESPIRATORY (INHALATION) EVERY 6 HOURS PRN
Status: DISCONTINUED | OUTPATIENT
Start: 2022-02-21 | End: 2022-02-23

## 2022-02-21 RX ORDER — ERGOCALCIFEROL 1.25 MG/1
50000 CAPSULE ORAL
Status: DISCONTINUED | OUTPATIENT
Start: 2022-02-22 | End: 2022-03-16 | Stop reason: HOSPADM

## 2022-02-21 RX ORDER — POTASSIUM CHLORIDE 20 MEQ/1
40 TABLET, EXTENDED RELEASE ORAL
Status: COMPLETED | OUTPATIENT
Start: 2022-02-21 | End: 2022-02-21

## 2022-02-21 RX ORDER — FUROSEMIDE 20 MG/1
40 TABLET ORAL DAILY
Qty: 45 TABLET | Refills: 1 | Status: ON HOLD
Start: 2022-02-21 | End: 2022-03-31 | Stop reason: SDUPTHER

## 2022-02-21 RX ORDER — ONDANSETRON 8 MG/1
8 TABLET, ORALLY DISINTEGRATING ORAL EVERY 8 HOURS PRN
Status: DISCONTINUED | OUTPATIENT
Start: 2022-02-21 | End: 2022-03-16 | Stop reason: HOSPADM

## 2022-02-21 RX ORDER — LISINOPRIL 10 MG/1
10 TABLET ORAL DAILY
Qty: 90 TABLET | Refills: 3 | Status: ON HOLD
Start: 2022-02-21 | End: 2022-03-31 | Stop reason: SDUPTHER

## 2022-02-21 RX ADMIN — METOPROLOL SUCCINATE 50 MG: 50 TABLET, EXTENDED RELEASE ORAL at 08:02

## 2022-02-21 RX ADMIN — IPRATROPIUM BROMIDE AND ALBUTEROL SULFATE 3 ML: 2.5; .5 SOLUTION RESPIRATORY (INHALATION) at 08:02

## 2022-02-21 RX ADMIN — Medication 6 MG: at 11:02

## 2022-02-21 RX ADMIN — TICAGRELOR 90 MG: 90 TABLET ORAL at 08:02

## 2022-02-21 RX ADMIN — FUROSEMIDE 40 MG: 40 TABLET ORAL at 08:02

## 2022-02-21 RX ADMIN — HYDROCODONE BITARTRATE AND ACETAMINOPHEN 1 TABLET: 5; 325 TABLET ORAL at 11:02

## 2022-02-21 RX ADMIN — POTASSIUM CHLORIDE 40 MEQ: 1500 TABLET, EXTENDED RELEASE ORAL at 10:02

## 2022-02-21 RX ADMIN — ATORVASTATIN CALCIUM 80 MG: 20 TABLET, FILM COATED ORAL at 11:02

## 2022-02-21 RX ADMIN — TICAGRELOR 90 MG: 90 TABLET ORAL at 11:02

## 2022-02-21 RX ADMIN — MUPIROCIN: 20 OINTMENT TOPICAL at 08:02

## 2022-02-21 RX ADMIN — PANTOPRAZOLE SODIUM 40 MG: 40 TABLET, DELAYED RELEASE ORAL at 08:02

## 2022-02-21 RX ADMIN — IPRATROPIUM BROMIDE AND ALBUTEROL SULFATE 3 ML: 2.5; .5 SOLUTION RESPIRATORY (INHALATION) at 02:02

## 2022-02-21 RX ADMIN — FLUTICASONE FUROATE AND VILANTEROL TRIFENATATE 1 PUFF: 100; 25 POWDER RESPIRATORY (INHALATION) at 08:02

## 2022-02-21 RX ADMIN — AMLODIPINE BESYLATE 10 MG: 10 TABLET ORAL at 08:02

## 2022-02-21 RX ADMIN — MAGNESIUM SULFATE 2 G: 2 INJECTION INTRAVENOUS at 07:02

## 2022-02-21 RX ADMIN — POTASSIUM CHLORIDE 40 MEQ: 1500 TABLET, EXTENDED RELEASE ORAL at 08:02

## 2022-02-21 RX ADMIN — ASPIRIN 81 MG: 81 TABLET, COATED ORAL at 08:02

## 2022-02-21 NOTE — PLAN OF CARE
Problem: Physical Therapy Goal  Goal: Physical Therapy Goal  Description: Goals to be met by: 3/3    Patient will increase functional independence with mobility by performin. Supine to sit with Modified [place]  2. Sit to supine with Modified [place]  3. Sit to stand transfer with Modified [place]  4. Gait  x 50 feet with Modified [place] using LRAD.   5. Ascend/Descend 6 inch curb step with Contact Guard Assistance using LRAD.    2022 1557 by Ellie Meeks, PT  Outcome: Ongoing, Progressing     Continue with plan of care.   Ellie Meeks, PT  2022

## 2022-02-21 NOTE — PLAN OF CARE
NURSING HOME ORDERS    02/21/2022  [facility]  DEBBIE WYATT - CARDIOLOGY STEPDOWN  [address]  [facility] [address]  Dept: [phone]  Loc: [phone]     Admit to Nursing Home:  Skilled    Diagnoses:  Active Hospital Problems    Diagnosis  POA    *NSTEMI (non-ST elevated myocardial infarction) [I21.4]  Yes     Priority: 1 - High    Acute on chronic diastolic congestive heart failure [I50.33]  Yes     Priority: 2     CKD (chronic kidney disease), stage III [N18.30]  Yes     Priority: 2     DM type 2, controlled, with complication [E11.8]  Yes     Priority: 3     Gastroesophageal reflux disease without esophagitis [K21.9]  Yes     Priority: 3     Hypercholesterolemia [E78.00]  Yes     Priority: 3     Essential hypertension, benign [I10]  Yes     Priority: 3     Coronary artery disease, multivessel with history of previous PCI [I25.10]  Yes     Priority: 4     Coronary atherosclerosis [I25.10]  Yes     Priority: 4     Rectus sheath hematoma [S30.1XXA]  Unknown    Venous stasis [I87.8]  Yes     Chronic    COPD/emphysema [J44.9]  Yes     Chronic      Resolved Hospital Problems   No resolved problems to display.       Code Status: Full    Patient is homebound due to:  NSTEMI (non-ST elevated myocardial infarction)    Allergies:  Review of patient's allergies indicates:   Allergen Reactions    Iodinated contrast media     Strawberries [strawberry] Hives and Itching       Vitals:  Routine    Diet: cardiac diet    Activities:   Activity as tolerated    Labs:  BMP q 3 days till PCI procedure date    Nursing Precautions:  Fall    Consults:   PT to evaluate and treat- 4 times a week and OT to evaluate and treat- 4 times a week     Miscellaneous Care: CHF Care: Daily Weight with notification of MD/NP of 2lb or > increase in 24 hours    v/s and O2 sat every shift    Oxygen as needed for sats <90%    Report abnormal breath sounds to MD/NP    Edema checks q shift- notify MD/NP of  increased edema    Task segmentation by nursing for daily care to decrease exertion    Schedule appointment with cardiologist, Dr. Benjamin in 3 Weeks    CHF education to include diet ,medication, and CHF flags for MD notification                   Diabetes Care:  Report CBG < 60 or > 350 to physician.      Medications: Discontinue all previous medication orders, if any. See new list below.     Medication List      START taking these medications    atorvastatin 80 MG tablet  Commonly known as: LIPITOR  Take 1 tablet (80 mg total) by mouth every evening.  Replaces: lovastatin 40 MG tablet     lisinopriL 10 MG tablet  Take 1 tablet (10 mg total) by mouth once daily.     ticagrelor 90 mg tablet  Commonly known as: BRILINTA  Take 1 tablet (90 mg total) by mouth 2 (two) times a day.        CHANGE how you take these medications    acetaminophen 325 MG tablet  Commonly known as: TYLENOL  Take 2 tablets (650 mg total) by mouth every 4 (four) hours as needed.  What changed:   · medication strength  · how much to take  · when to take this  · reasons to take this     amLODIPine 10 MG tablet  Commonly known as: NORVASC  Take 1 tablet (10 mg total) by mouth once daily.  What changed:   · medication strength  · how much to take     furosemide 20 MG tablet  Commonly known as: LASIX  Take 2 tablets (40 mg total) by mouth once daily.  What changed:   · how much to take  · when to take this     metoprolol succinate 50 MG 24 hr tablet  Commonly known as: TOPROL-XL  Take 1 tablet (50 mg total) by mouth once daily.  What changed: how much to take        CONTINUE taking these medications    albuterol 90 mcg/actuation inhaler  Commonly known as: VENTOLIN HFA  Inhale 2 puffs into the lungs every 6 (six) hours as needed for Wheezing or Shortness of Breath. Rescue     aspirin 81 MG EC tablet  Commonly known as: ECOTRIN  Take 81 mg by mouth every morning.     bromfenac 0.07 % Drop  Commonly known as: PROLENSA  Place 1 drop into both eyes  daily as needed.     coenzyme Q10 100 mg capsule  Take 100 mg by mouth every morning.     CombiVENT RESPIMAT  mcg/actuation inhaler  Generic drug: ipratropium-albuteroL  Inhale 2 puffs into the lungs every 4 (four) hours as needed for Shortness of Breath. Rescue     dextran 70-hypromellose 0.1-0.3 % Drop  Place 1 drop into both eyes daily as needed.     ergocalciferol 50,000 unit Cap  Commonly known as: VITAMIN D2  Take 1 capsule (50,000 Units total) by mouth every 7 days.     fish oil-omega-3 fatty acids 300-1,000 mg capsule  Take 1 capsule by mouth every morning.     FLAXSEED OIL ORAL  Take 1,200 mg by mouth every morning.     fluticasone-salmeterol 250-50 mcg/dose 250-50 mcg/dose diskus inhaler  Commonly known as: ADVAIR DISKUS  Inhale 1 puff into the lungs 2 (two) times daily.     INCRUSE ELLIPTA 62.5 mcg/actuation inhalation capsule  Generic drug: umeclidinium  Inhale 1 capsule (63 mcg total) into the lungs every morning. Controller     NARCAN 4 mg/actuation Spry  Generic drug: naloxone     * nitroGLYCERIN 0.2 mg/hr TD PT24 0.2 mg/hr  Commonly known as: NITRODUR  APPLY 1 PATCH TOPICALLY ONCE DAILY TO LEG.     * nitroGLYCERIN 0.4 MG/DOSE TL SPRY 400 mcg/spray spray  Commonly known as: NITROLINGUAL  USE ONE SPRAY UNDER THE TONGUE EVERY 5 MINUTES AS NEEDED FOR CHEST PAIN.  DO NOT EXCEED A TOTAL OF 3 SPRAYS IN 15 MINUTES     ondansetron 4 MG Tbdl  Commonly known as: ZOFRAN-ODT  Take 2 tablets (8 mg total) by mouth every 6 (six) hours as needed.     pantoprazole 40 MG tablet  Commonly known as: PROTONIX  Take 1 tablet (40 mg total) by mouth once daily.     PRENATAL #115-IRON-FOLIC ACID ORAL  Take 1 tablet by mouth every morning.     PRESERVISION AREDS-2 ORAL  Take 1 capsule by mouth every morning.     temazepam 15 mg Cap  Commonly known as: RESTORIL  Take 1 capsule (15 mg total) by mouth every evening.     triamcinolone acetonide 0.1% 0.1 % cream  Commonly known as: KENALOG  Apply topically 2 (two) times  daily. For legs.         * This list has 2 medication(s) that are the same as other medications prescribed for you. Read the directions carefully, and ask your doctor or other care provider to review them with you.            STOP taking these medications    benazepriL 40 MG tablet  Commonly known as: LOTENSIN     lovastatin 40 MG tablet  Commonly known as: MEVACOR  Replaced by: atorvastatin 80 MG tablet     MODERNA COVID-19 VACCINE (EUA) IM     predniSONE 20 MG tablet  Commonly known as: DELTASONE              Immunizations Administered as of 2/21/2022     Name Date Dose VIS Date Route Exp Date    COVID-19, MRNA, LN-S, PF (Moderna) 10/28/2021 -- -- Intramuscular --    Site: Left arm     Lot: [id number]     COVID-19, MRNA, LN-S, PF (Moderna) 2/10/2021 -- -- Intramuscular --    Site: Left arm     Lot: [id number]     COVID-19, MRNA, LN-S, PF (Moderna) 1/13/2021 -- -- Intramuscular --    Site: Left arm     Lot: [id number]           This patient has had both covid vaccinations    Some patients may experience side effects after vaccination.  These may include fever, headache, muscle or joint aches.  Most symptoms resolve with 24-48 hours and do not require urgent medical evaluation unless they persist for more than 72 hours or symptoms are concerning for an unrelated medical condition.          _________________________________  Becca Jacobson MD  02/21/2022

## 2022-02-21 NOTE — PLAN OF CARE
02/21/22 1531   Post-Acute Status   Post-Acute Authorization Placement   Post-Acute Placement Status Pending medical clearance/testing  (Covid test)     Pt accepted by [facility] per liachema Conte pending Covid test.  SW informed pt at bedside of acceptance.    Ronda Esquivel, MARIE  [facility]  [phone]

## 2022-02-21 NOTE — PLAN OF CARE
Problem: Physical Therapy Goal  Goal: Physical Therapy Goal  Description: Goals to be met by: 3/3    Patient will increase functional independence with mobility by performin. Supine to sit with Modified [place]  2. Sit to supine with Modified [place]  3. Sit to stand transfer with Modified [place]  4. Gait  x 50 feet with Modified [place] using LRAD.   5. Ascend/Descend 6 inch curb step with Contact Guard Assistance using LRAD.    2022 1555 by Ellie Meeks, PT  Outcome: Ongoing, Progressing  2022 1555 by Ellie Meeks, PT  Outcome: Ongoing, Progressing   Continue with plan of care.   Ellie Meeks, PT  2022

## 2022-02-21 NOTE — NURSING
Patient is ready for discharge to SNF. Patient stable alert and oriented. IVs removed. No complaints of pain. Discussed discharge plan. Reviewed medications and side effects, appointments, and answered questions with patient and family. RN attempted to call report but SNF was not ready to receive report. [facility] transport arrived immediately following first attempt. RN D/C'd Pt and then called SNF again to give report. Report given to SNF OFELIA Noyola.

## 2022-02-21 NOTE — PT/OT/SLP PROGRESS
"Physical Therapy Treatment    Patient Name:  Marisol Kerr   MRN:  [id number]    Recommendations:     Discharge Recommendations:  nursing facility, skilled   Discharge Equipment Recommendations:  (TBD pending progress)   Barriers to discharge: Inaccessible home, Decreased caregiver support and risk of falls, below functional baseline    Assessment:     Marisol Kerr is a 76 y.o. female admitted with a medical diagnosis of NSTEMI (non-ST elevated myocardial infarction).  She presents with the following impairments/functional limitations:  weakness, gait instability, impaired endurance, impaired self care skills, impaired functional mobilty, impaired cardiopulmonary response to activity, impaired skin, edema. The patient shows improvement in gait tolerance, ambulated 80' with RW and contact guard assist on 2L O2 with O2 sats 93% during rest and gait. She is not safe to return home due to risk of falls. She would benefit from SNF placement to address the above deficits and maximize their functional mobility.      Rehab Prognosis: Good; patient would benefit from acute skilled PT services to address these deficits and reach maximum level of function.    Recent Surgery: Procedure(s) (LRB):  Left heart cath (Left)      Plan:     During this hospitalization, patient to be seen 4 x/week to address the identified rehab impairments via gait training, therapeutic activities, therapeutic exercises, neuromuscular re-education and progress toward the following goals:    · Plan of Care Expires:  03/14/22    Subjective     Chief Complaint: "I keep having diarrhea, I don't want to have an accident in the hallway"  Patient/Family Comments/goals: "I want to go home, I do everything for myself"  Pain/Comfort:  · Pain Rating 1: 0/10      Objective:     Communicated with RN prior to session.  Patient found sitting on BSC with peripheral IV, telemetry, oxygen upon PT entry to room.     General Precautions: Standard, fall   Orthopedic " Precautions:N/A   Braces: N/A  Respiratory Status: Nasal cannula, flow 2 L/min     Functional Mobility:    Bed Mobility  Deferred, found sitting up on bedside commode   Transfers Sit to Stand:  contact guard assist, cues for set up   Gait  Gait Distance: 80 ft with RW  Assistance Level: contact guard assist   Description: kyphotic posture, ambulating outside DOUG, decreased esther, increased lateral weight shift, limited by dyspnea    On 2L O2 at rest/gait O2 93%, HR at rest 77, HR with gait 88 BPM          AM-PAC 6 CLICK MOBILITY  Turning over in bed (including adjusting bedclothes, sheets and blankets)?: 3  Sitting down on and standing up from a chair with arms (e.g., wheelchair, bedside commode, etc.): 3  Moving from lying on back to sitting on the side of the bed?: 3  Moving to and from a bed to a chair (including a wheelchair)?: 3  Need to walk in hospital room?: 3  Climbing 3-5 steps with a railing?: 2  Basic Mobility Total Score: 17       Therapeutic Activities and Exercises:   Patient educated on role of therapy, goals of session, benefits of out of bed mobility. Patient agreeable to mobilize with therapy.   Pericare performed in standing.     Gait training: cued for pacing and energy conservation, cued for PLB    Seated therex to improve LE strength and activity tolerance, cued for full ROM and eccentric lowering:  -LAQ, ELBERT 10 ea  -Marching, ELBERT 10 ea  -AP, ELBERT 15 ea  Encouraged patient to perform 10 reps ea/hour, verbalized understanding and agreement.     Patient educated on PT schedule.  Encouraged patient to ambulate, sit up in chair 3x/day to prevent deconditioning during hospitalization. Patient verbalized understanding and agreement not to mobilize without RN assist. Patient in agreement with PT POC, SNF.    Patient safe to ambulate with 1 person RN assist with RW.        Patient left up in chair with all lines intact and call button in reach..    GOALS:   Multidisciplinary Problems     Physical  Therapy Goals        Problem: Physical Therapy Goal    Goal Priority Disciplines Outcome Goal Variances Interventions   Physical Therapy Goal     PT, PT/OT Ongoing, Progressing     Description: Goals to be met by: 3/3    Patient will increase functional independence with mobility by performin. Supine to sit with Modified [place]  2. Sit to supine with Modified [place]  3. Sit to stand transfer with Modified [place]  4. Gait  x 50 feet with Modified [place] using LRAD.   5. Ascend/Descend 6 inch curb step with Contact Guard Assistance using LRAD.                     Time Tracking:     PT Received On: 22  PT Start Time: 1115     PT Stop Time: 1145  PT Total Time (min): 30 min     Billable Minutes: Gait Training 15 and Therapeutic Exercise 15    Treatment Type: Treatment  PT/PTA: PT     PTA Visit Number: 0     2022

## 2022-02-21 NOTE — DISCHARGE SUMMARY
"  [facility] Step[facility]  Cardiology  Discharge Summary      Patient Name: Marisol Kerr  MRN: [id number]  Admission Date: 2/11/2022  Hospital Length of Stay: 10 days  Discharge Date and Time:  02/21/2022 2:13 PM  Attending Physician: Kaycee Mitchell MD    Discharging Provider: Becca Jacobson MD  Primary Care Physician: Khadar Dwyer MD    HPI:   Ms Marisol Kerr is a 76 year old female with PMH of DM2, HTN, HLD, CKD, COPD who initially presented to [facility] yesterday for a left heart catheterization to evaluate  recently increasing episodes of angina requiring more frequent Nitroglycerin use. She has history of iodine allergy and was pretreated with prednisone, but post- procedure she developed SOB, respiratory distress requiring overnight hospitalization for monitoring. She also had elevated blood pressures with systolics greater than 200s during hospitalization. Overnight, as she woke up to urinate she felt 10/10 chest pain all over that she described as "burning", and "veins being ripped apart". STAT EKG at the time showed concerns for  lateral precordial ST depressions. She was initiated on nitroglycerin drip, heparin drip. One dose of  morphine 2mg & Lopressor 5mg IV were also administered. She reported alleviation of her chest pain after,  and it has not recurred on Nitroglycerin drip. She was transferred to the hospital for further evaluation of her ACS and potential emergent intervention. LHC at outside hospital was notable for significant coraonary artery disease:    Angiogram, Coronary, with Left Heart Cath[02/10/22]  · The RPAV lesion was 100% stenosed.  · The RPDA lesion was 100% stenosed.  · The Prox Cx to Mid Cx lesion was 80% stenosed.  · The Dist Cx lesion was 70% stenosed.  · The 1st LPL lesion was 90% stenosed.  · The Prox RCA-2 lesion was 70% stenosed.  · The Prox RCA-1 lesion was 90% stenosed.  · The Mid LAD-2 lesion was 60% stenosed.  · The estimated blood loss was <50 " mL.  · There was three vessel coronary artery disease.           Procedure(s) (LRB):  Left heart cath (Left)     Indwelling Lines/Drains at time of discharge:  Lines/Drains/Airways     Drain  Duration           Female External Urinary Catheter 02/12/22 0700 9 days                Hospital Course:  Patient admitted to CCU while pending evaluation for ACS intervention, given her NSTEMI. Interventional Cardiology and Cardiothoracic Surgery consulted. Initiated guideline directed medical therapy for ACS. Nitroglycerin infusion for chest pain alleviation.  CTS evaluation did not recommend patient for CABG due to her debility as she is prohibitively high risk for CABG. Given contrast allergy, Interventional Cards started on prednisone 50 mg, famotidine 20, diphenhydramine 50 in anticipation of LHC. Patient had significant abdominal pain overnight and distended abdomen with mass. In light of this, IC deferred LHC. CT of the abdomen showed a large rectus sheath hematoma. Given patient's kidney function, CTA was deferred temporarily but the abdominal mass continued to enlarge. CTA of the abdomen showed extravasation of contrast into the rectus sheath hematoma. IR consulted and performed embolization of the inferior epigastric and collateral arteries. Patient's Cr elevated to 1.9 likely due to contrast. Started patient on slow NS infusion and will defer IC intervention until Cr returns to baseline or stabilizes. Patient's Hgb dropped requiring transfusion with aim >10 for IC intervention. Patient was given 2 pRBC and Hgb responded up to 9.2. IC recommended that since patient had a recent bleed, would defer procedure for outpatient in 1-2 weeks. Patient discharged to SNF and has appointment with Dr. Chappell for possible PCI on 3/17/2022.       Goals of Care Treatment Preferences:  Code Status: Full Code    BP (!) 155/78 (BP Location: Left arm, Patient Position: Lying)   Pulse 71   Temp 98.9 °F (37.2 °C) (Oral)   Resp 18   Ht  "5' 3" (1.6 m)   Wt 82.9 kg (182 lb 12.2 oz)   SpO2 97%   BMI 32.37 kg/m²      Physical Exam  Vitals and nursing note reviewed.   Constitutional:       General: She is not in acute distress.     Appearance: Normal appearance. She is obese. She is not ill-appearing, toxic-appearing or diaphoretic.   HENT:      Head: Normocephalic and atraumatic.      Right Ear: External ear normal.      Left Ear: External ear normal.   Eyes:      General: No scleral icterus.        Right eye: No discharge.         Left eye: No discharge.      Extraocular Movements: Extraocular movements intact.      Conjunctiva/sclera: Conjunctivae normal.   Cardiovascular:      Rate and Rhythm: Normal rate and regular rhythm.      Pulses: Normal pulses.      Heart sounds: Normal heart sounds.   Pulmonary:      Effort: Pulmonary effort is normal. No respiratory distress.      Breath sounds: No wheezing or rales.   Chest:      Chest wall: No tenderness.   Abdominal:      General: There is no distension.      Palpations: There is mass.      Tenderness: There is no abdominal tenderness. There is no guarding.   Musculoskeletal:         General: No swelling or tenderness. Normal range of motion.      Cervical back: Normal range of motion and neck supple.      Right lower leg: Edema present.      Left lower leg: Edema present.   Skin:     General: Skin is warm and dry.      Capillary Refill: Capillary refill takes less than 2 seconds.      Findings: Erythema (lower extermity venous stasis) present.   Neurological:      General: No focal deficit present.      Mental Status: She is alert. Mental status is at baseline.   Psychiatric:         Mood and Affect: Mood normal.         Behavior: Behavior normal.     Consults:   Consults (From admission, onward)        Status Ordering Provider     Inpatient consult to Interventional Radiology  Once        Provider:  (Not yet assigned)    Completed MONTSE BUTLER     Inpatient consult to Registered " Dietitian/Nutritionist  Once        Provider:  (Not yet assigned)    Completed BIGG LOREDO     Inpatient consult to Cardiothoracic Surgery  Once        Provider:  (Not yet assigned)    Completed BIGG LOREDO     Inpatient consult to Interventional Cardiology  Once        Provider:  (Not yet assigned)    Completed BIGG LOREDO          Significant Diagnostic Studies: Labs:   CMP   Recent Labs   Lab 02/20/22  0357 02/21/22  0456    136   K 4.0 3.1*   CL 93* 90*   CO2 36* 31*   * 105   BUN 37* 51*   CREATININE 1.4 1.6*   CALCIUM 9.2 8.7   PROT 6.2 5.9*   ALBUMIN 2.9* 2.7*   BILITOT 2.7* 2.5*   ALKPHOS 72 64   AST 19 20   ALT 20 18   ANIONGAP 13 15   ESTGFRAFRICA 42.7* 36.3*   EGFRNONAA 37.0* 31.5*   , CBC   Recent Labs   Lab 02/20/22  0357 02/20/22  1542 02/21/22  0456   WBC 12.00 14.32* 14.28*   HGB 10.5* 10.9* 10.5*   HCT 33.5* 35.0* 33.7*    200 169    and All labs within the past 24 hours have been reviewed  Cardiac Graphics: Echocardiogram:   Transthoracic echo (TTE) complete (Cupid Only):   Results for orders placed or performed during the hospital encounter of 02/11/22   Echo   Result Value Ref Range    Ascending aorta 2.24 cm    STJ 2.11 cm    AV mean gradient 8 mmHg    Ao peak asim 1.80 m/s    Ao VTI 36.43 cm    IVRT 91.34 msec    IVS 0.86 0.6 - 1.1 cm    LA size 4.33 cm    Left Atrium Major Axis 5.64 cm    Left Atrium Minor Axis 5.64 cm    LVIDd 4.74 3.5 - 6.0 cm    LVIDs 2.64 2.1 - 4.0 cm    LVOT diameter 2.00 cm    LVOT peak VTI 27.39 cm    Posterior Wall 0.89 0.6 - 1.1 cm    MV Peak A Asim 1.03 m/s    E wave deceleration time 194.92 msec    MV Peak E Asim 0.94 m/s    RA Major Axis 4.52 cm    RA Width 3.45 cm    RVDD 2.85 cm    Sinus 2.57 cm    TAPSE 3.70 cm    TDI LATERAL 0.05 m/s    TDI SEPTAL 0.05 m/s    LA WIDTH 4.64 cm    MV stenosis pressure 1/2 time 56.53 ms    LV Diastolic Volume 104.66 mL    LV Systolic Volume 25.44 mL    RV S' 20.60 cm/s    LVOT  peak tiki 1.21 m/s    LA volume (mod) 78.65 cm3    MV mean gradient 1 mmHg    MV peak gradient 5 mmHg    MV VTI 28.95 cm    LV LATERAL E/E' RATIO 18.80 m/s    LV SEPTAL E/E' RATIO 18.80 m/s    FS 44 %    LA volume 96.32 cm3    LV mass 139.43 g    Left Ventricle Relative Wall Thickness 0.38 cm    AV valve area 2.36 cm2    AV Velocity Ratio 0.67     AV index (prosthetic) 0.75     MV valve area p 1/2 method 3.89 cm2    MV valve area by continuity eq 2.97 cm2    E/A ratio 0.91     Mean e' 0.05 m/s    LVOT area 3.1 cm2    LVOT stroke volume 86.00 cm3    AV peak gradient 13 mmHg    E/E' ratio 18.80 m/s    LV Systolic Volume Index 13.3 mL/m2    LV Diastolic Volume Index 54.51 mL/m2    LA Volume Index 50.2 mL/m2    LV Mass Index 73 g/m2    LA Volume Index (Mod) 41.0 mL/m2    BSA 1.99 m2    Right Atrial Pressure (from IVC) 3 mmHg    EF 65 %    Narrative    · The left ventricle is normal in size with normal systolic function.  · The estimated ejection fraction is 65%.  · There is a minor septal wall motion abnormality.  · Severe left atrial enlargement.  · Grade II left ventricular diastolic dysfunction.  · Normal right ventricular size with normal right ventricular systolic   function.  · Normal central venous pressure (3 mmHg).        · Angiography: The RPAV lesion was 100% stenosed.  · The RPDA lesion was 100% stenosed.  · The Prox Cx to Mid Cx lesion was 80% stenosed.  · The Dist Cx lesion was 70% stenosed.  · The 1st LPL lesion was 90% stenosed.  · The Prox RCA-2 lesion was 70% stenosed.  · The Prox RCA-1 lesion was 90% stenosed.  · The Mid LAD-2 lesion was 60% stenosed.  · The estimated blood loss was <50 mL.  · There was three vessel coronary artery disease.        Pending Diagnostic Studies:     Procedure Component Value Units Date/Time    APTT [[id number]] Collected: 02/13/22 0838    Order Status: Sent Lab Status: In process Updated: 02/13/22 0839    Specimen: Blood     CBC auto differential [[id number]]     Order  Status: Sent Lab Status: No result     Specimen: Blood     CBC auto differential [[id number]] Collected: 02/18/22 1409    Order Status: Sent Lab Status: In process Updated: 02/18/22 1409    Specimen: Blood     Narrative:      Collection has been rescheduled by [facility] at 02/18/2022 11:32 Reason:   That 11:30 is a duplicate already drawn spoke to nurse     COVID-19 Routine Screening Extended Care Placement [[id number]] Collected: 02/21/22 1217    Order Status: Sent Lab Status: In process Updated: 02/21/22 1222    Specimen: Nasopharyngeal           Final Active Diagnoses:    Diagnosis Date Noted POA    PRINCIPAL PROBLEM:  NSTEMI (non-ST elevated myocardial infarction) [I21.4] 02/11/2022 Yes    Acute on chronic diastolic congestive heart failure [I50.33] 09/17/2019 Yes    CKD (chronic kidney disease), stage III [N18.30] 10/08/2014 Yes    DM type 2, controlled, with complication [E11.8] 06/03/2013 Yes    Gastroesophageal reflux disease without esophagitis [K21.9] 06/03/2013 Yes    Hypercholesterolemia [E78.00] 04/09/2013 Yes    Essential hypertension, benign [I10] 04/09/2013 Yes    Coronary artery disease, multivessel with history of previous PCI [I25.10] 06/03/2013 Yes    Coronary atherosclerosis [I25.10] 04/09/2013 Yes    Rectus sheath hematoma [S30.1XXA] 02/14/2022 Unknown    Venous stasis [I87.8] 02/10/2022 Yes     Chronic    COPD/emphysema [J44.9] 01/16/2019 Yes     Chronic      Problems Resolved During this Admission:     No new Assessment & Plan notes have been filed under this hospital service since the last note was generated.  Service: Cardiology      Discharged Condition: stable    Disposition: Skilled Nursing Facility    Follow Up:    Patient Instructions:      Ambulatory referral/consult to Outpatient Case Management   Referral Priority: Routine Referral Type: Consultation   Referral Reason: Specialty Services Required   Number of Visits Requested: 1     Ambulatory referral/consult to Interventional  Cardiology   Standing Status: Future   Referral Priority: Urgent Referral Type: Consultation   Referral Reason: Specialty Services Required   Referred to Provider: ALLAN WARD Requested Specialty: Interventional Cardiology   Number of Visits Requested: 1     Medications:  Reconciled Home Medications:      Medication List      START taking these medications    atorvastatin 80 MG tablet  Commonly known as: LIPITOR  Take 1 tablet (80 mg total) by mouth every evening.  Replaces: lovastatin 40 MG tablet     lisinopriL 10 MG tablet  Take 1 tablet (10 mg total) by mouth once daily.     ticagrelor 90 mg tablet  Commonly known as: BRILINTA  Take 1 tablet (90 mg total) by mouth 2 (two) times a day.        CHANGE how you take these medications    acetaminophen 325 MG tablet  Commonly known as: TYLENOL  Take 2 tablets (650 mg total) by mouth every 4 (four) hours as needed.  What changed:   · medication strength  · how much to take  · when to take this  · reasons to take this     amLODIPine 10 MG tablet  Commonly known as: NORVASC  Take 1 tablet (10 mg total) by mouth once daily.  What changed:   · medication strength  · how much to take     furosemide 20 MG tablet  Commonly known as: LASIX  Take 2 tablets (40 mg total) by mouth once daily.  What changed:   · how much to take  · when to take this     metoprolol succinate 50 MG 24 hr tablet  Commonly known as: TOPROL-XL  Take 1 tablet (50 mg total) by mouth once daily.  What changed: how much to take        CONTINUE taking these medications    albuterol 90 mcg/actuation inhaler  Commonly known as: VENTOLIN HFA  Inhale 2 puffs into the lungs every 6 (six) hours as needed for Wheezing or Shortness of Breath. Rescue     aspirin 81 MG EC tablet  Commonly known as: ECOTRIN  Take 81 mg by mouth every morning.     bromfenac 0.07 % Drop  Commonly known as: PROLENSA  Place 1 drop into both eyes daily as needed.     coenzyme Q10 100 mg capsule  Take 100 mg by mouth every  morning.     CombiVENT RESPIMAT  mcg/actuation inhaler  Generic drug: ipratropium-albuteroL  Inhale 2 puffs into the lungs every 4 (four) hours as needed for Shortness of Breath. Rescue     dextran 70-hypromellose 0.1-0.3 % Drop  Place 1 drop into both eyes daily as needed.     ergocalciferol 50,000 unit Cap  Commonly known as: VITAMIN D2  Take 1 capsule (50,000 Units total) by mouth every 7 days.     fish oil-omega-3 fatty acids 300-1,000 mg capsule  Take 1 capsule by mouth every morning.     FLAXSEED OIL ORAL  Take 1,200 mg by mouth every morning.     fluticasone-salmeterol 250-50 mcg/dose 250-50 mcg/dose diskus inhaler  Commonly known as: ADVAIR DISKUS  Inhale 1 puff into the lungs 2 (two) times daily.     INCRUSE ELLIPTA 62.5 mcg/actuation inhalation capsule  Generic drug: umeclidinium  Inhale 1 capsule (63 mcg total) into the lungs every morning. Controller     NARCAN 4 mg/actuation Spry  Generic drug: naloxone     * nitroGLYCERIN 0.2 mg/hr TD PT24 0.2 mg/hr  Commonly known as: NITRODUR  APPLY 1 PATCH TOPICALLY ONCE DAILY TO LEG.     * nitroGLYCERIN 0.4 MG/DOSE TL SPRY 400 mcg/spray spray  Commonly known as: NITROLINGUAL  USE ONE SPRAY UNDER THE TONGUE EVERY 5 MINUTES AS NEEDED FOR CHEST PAIN.  DO NOT EXCEED A TOTAL OF 3 SPRAYS IN 15 MINUTES     ondansetron 4 MG Tbdl  Commonly known as: ZOFRAN-ODT  Take 2 tablets (8 mg total) by mouth every 6 (six) hours as needed.     pantoprazole 40 MG tablet  Commonly known as: PROTONIX  Take 1 tablet (40 mg total) by mouth once daily.     PRENATAL #115-IRON-FOLIC ACID ORAL  Take 1 tablet by mouth every morning.     PRESERVISION AREDS-2 ORAL  Take 1 capsule by mouth every morning.     temazepam 15 mg Cap  Commonly known as: RESTORIL  Take 1 capsule (15 mg total) by mouth every evening.     triamcinolone acetonide 0.1% 0.1 % cream  Commonly known as: KENALOG  Apply topically 2 (two) times daily. For legs.         * This list has 2 medication(s) that are the same as  other medications prescribed for you. Read the directions carefully, and ask your doctor or other care provider to review them with you.            STOP taking these medications    benazepriL 40 MG tablet  Commonly known as: LOTENSIN     lovastatin 40 MG tablet  Commonly known as: MEVACOR  Replaced by: atorvastatin 80 MG tablet     MODERNA COVID-19 VACCINE (EUA) IM     predniSONE 20 MG tablet  Commonly known as: DELTASONE            Time spent on the discharge of patient: 35 minutes    Becca Jacobson MD  Cardiology  [facility]

## 2022-02-21 NOTE — PLAN OF CARE
02/21/22 1641   Post-Acute Status   Post-Acute Authorization Placement   Post-Acute Placement Status Set-up Complete/Auth obtained     Covid test negative.  Transportation scheduled via wheelchair van for 5:00pm (time NOT guaranteed) to transfer to [facility].  OFELIA De León to call report to [phone].  OFELIA De León was notified of the above information.   also called pt's daughter Marisol and informed her of transfer and provided her with the phone number for [facility].    Ronda Esquivel LMSW  [facility]  [phone]

## 2022-02-21 NOTE — PLAN OF CARE
02/21/22 1117   Post-Acute Status   Post-Acute Authorization Placement   Post-Acute Placement Status Pending post-acute provider review/more information requested     SW informed by treatment team that pt will not be getting any interventions and therefore is medically ready to discharge.  ADRIANA met with pt and daughter Marisol at bedside who reported that [facility] choice facility is [facility], second choice [facility], and third choice [facility].  Referrals sent via [facility].  Currently under review with [facility] per liachema Conte.    Ronda Esquivel LMSW  [facility]  [phone]

## 2022-02-22 PROCEDURE — 11000004 HC SNF PRIVATE

## 2022-02-22 PROCEDURE — 25000003 PHARM REV CODE 250: Performed by: HOSPITALIST

## 2022-02-22 PROCEDURE — 25000242 PHARM REV CODE 250 ALT 637 W/ HCPCS: Performed by: HOSPITALIST

## 2022-02-22 PROCEDURE — 97535 SELF CARE MNGMENT TRAINING: CPT

## 2022-02-22 PROCEDURE — 97162 PT EVAL MOD COMPLEX 30 MIN: CPT

## 2022-02-22 PROCEDURE — 97110 THERAPEUTIC EXERCISES: CPT

## 2022-02-22 PROCEDURE — 27000221 HC OXYGEN, UP TO 24 HOURS

## 2022-02-22 PROCEDURE — 94761 N-INVAS EAR/PLS OXIMETRY MLT: CPT

## 2022-02-22 PROCEDURE — 99900035 HC TECH TIME PER 15 MIN (STAT)

## 2022-02-22 PROCEDURE — 97165 OT EVAL LOW COMPLEX 30 MIN: CPT

## 2022-02-22 RX ORDER — ALUMINUM HYDROXIDE, MAGNESIUM HYDROXIDE, AND SIMETHICONE 2400; 240; 2400 MG/30ML; MG/30ML; MG/30ML
30 SUSPENSION ORAL EVERY 6 HOURS PRN
Status: DISCONTINUED | OUTPATIENT
Start: 2022-02-22 | End: 2022-03-09

## 2022-02-22 RX ADMIN — ASPIRIN 81 MG CHEWABLE TABLET 81 MG: 81 TABLET CHEWABLE at 08:02

## 2022-02-22 RX ADMIN — LISINOPRIL 10 MG: 10 TABLET ORAL at 08:02

## 2022-02-22 RX ADMIN — TICAGRELOR 90 MG: 90 TABLET ORAL at 09:02

## 2022-02-22 RX ADMIN — ONDANSETRON 8 MG: 8 TABLET, ORALLY DISINTEGRATING ORAL at 09:02

## 2022-02-22 RX ADMIN — METOPROLOL SUCCINATE 50 MG: 50 TABLET, EXTENDED RELEASE ORAL at 08:02

## 2022-02-22 RX ADMIN — TRIAMCINOLONE ACETONIDE: 1 CREAM TOPICAL at 08:02

## 2022-02-22 RX ADMIN — FLUTICASONE FUROATE AND VILANTEROL TRIFENATATE 1 PUFF: 100; 25 POWDER RESPIRATORY (INHALATION) at 08:02

## 2022-02-22 RX ADMIN — ERGOCALCIFEROL 50000 UNITS: 1.25 CAPSULE ORAL at 08:02

## 2022-02-22 RX ADMIN — Medication 6 MG: at 09:02

## 2022-02-22 RX ADMIN — PANTOPRAZOLE SODIUM 40 MG: 40 TABLET, DELAYED RELEASE ORAL at 08:02

## 2022-02-22 RX ADMIN — TICAGRELOR 90 MG: 90 TABLET ORAL at 08:02

## 2022-02-22 RX ADMIN — ATORVASTATIN CALCIUM 80 MG: 20 TABLET, FILM COATED ORAL at 09:02

## 2022-02-22 RX ADMIN — HYDROCODONE BITARTRATE AND ACETAMINOPHEN 1 TABLET: 5; 325 TABLET ORAL at 09:02

## 2022-02-22 RX ADMIN — TIOTROPIUM BROMIDE INHALATION SPRAY 2 PUFF: 3.12 SPRAY, METERED RESPIRATORY (INHALATION) at 08:02

## 2022-02-22 RX ADMIN — Medication 1 CAPSULE: at 08:02

## 2022-02-22 RX ADMIN — AMLODIPINE BESYLATE 10 MG: 10 TABLET ORAL at 08:02

## 2022-02-22 RX ADMIN — FUROSEMIDE 40 MG: 40 TABLET ORAL at 08:02

## 2022-02-22 RX ADMIN — Medication 100 MG: at 08:02

## 2022-02-22 NOTE — PROGRESS NOTES
"                                                        [facility]                                  Skilled Nursing Facility                   Progress Note     Admit Date: 2/21/2022  LUZ  TBD  Principal Problem:  NSTEMI (non-ST elevated myocardial infarction)   HPI obtained from patient interview and chart review     Chief Complaint: Establish Care/ Initial Visit     HPI:   Ms Kerr is a 76 year old female with PMHx of DM2, HTN, HLD, CKD, COPD on home O2 who presents SNF following hospitalization for NSTEMI.  Admission to SNF for secondary to weakness and debility.    Patient initially presented to [facility] for a left heart catheterization to evaluate recently increasing episodes of angina requiring more frequent Nitroglycerin use. She has history of iodine allergy and was pretreated with prednisone, but post- procedure she developed SOB, respiratory distress requiring overnight hospitalization for monitoring. She also had elevated blood pressures with systolics greater than 200s during hospitalization. Overnight, as she woke up to urinate she felt 10/10 chest pain all over that she described as "burning", and "veins being ripped apart". STAT EKG at the time showed concerns for  lateral precordial ST depressions. She was transferred to [facility] for further evaluation of her ACS and potential emergent intervention. LHC at outside hospital was notable for significant coraonary artery disease. Patient admitted to CCU while pending evaluation for ACS intervention, given her NSTEMI. Interventional Cardiology and Cardiothoracic Surgery consulted. Initiated guideline directed medical therapy for ACS.   CTS evaluation did not recommend patient for CABG due to her debility as she is prohibitively high risk for CABG. Patient had significant abdominal pain overnight and distended abdomen with mass. In light of this, IC deferred LHC. CT of the abdomen showed a large rectus sheath hematoma. Given " patient's kidney function, CTA was deferred temporarily but the abdominal mass continued to enlarge. CTA of the abdomen showed extravasation of contrast into the rectus sheath hematoma. IR consulted and performed embolization of the inferior epigastric and collateral arteries. Patient's Cr elevated to 1.9 likely due to contrast. Patient's Hgb dropped requiring transfusion with aim >10 for IC intervention. Patient was given 2 pRBC and Hgb responded up to 9.2. IC recommended that since patient had a recent bleed, would defer procedure for outpatient in 1-2 weeks. Patient discharged to SNF and has appointment with Dr. Chappell for possible PCI on 3/17/2022.     Today, patient is without major complaints.  She denies chest pain at this time.  Currently wearing supplemental O2.  Later in the day, patient reported dysuria to nursing.  UA with reflex ordered.    Patient will be treated at [facility] with PT and OT to improve functional status and ability to perform ADLs.     Past Medical History: Patient has a past medical history of CAD (coronary artery disease), Cancer, CHF (congestive heart failure), Colitis, Colon cancer, COPD (chronic obstructive pulmonary disease), Decreased hearing, Diabetes mellitus, Diabetes mellitus, type 2, Hypercholesterolemia, Hypertension, Hypoxemia, Insomnia, Obesity, and Osteoarthritis.    Past Surgical History: Patient has a past surgical history that includes External ear surgery; Colectomy; Cholecystectomy; Flexible sigmoidoscopy (N/A, 9/19/2019); Eye surgery; Hysterectomy; Coronary stent placement; Cardiac catheterization; Coronary angioplasty; and ANGIOGRAM, CORONARY, WITH LEFT HEART CATHETERIZATION (N/A, 2/10/2022).    Social History: Patient reports that she quit smoking about 15 years ago. Her smoking use included cigarettes. She started smoking about 57 years ago. She has a 150.00 pack-year smoking history. She has never used smokeless tobacco. She reports current alcohol use. She  reports that she does not use drugs.    Family History: family history includes Febrile seizures in her mother; Heart failure in her father.    Allergies: Patient is allergic to iodinated contrast media and strawberries [strawberry].    ROS  Constitutional: Negative for fever   Eyes: Negative for blurred vision, double vision   Respiratory: Negative for cough, shortness of breath   Cardiovascular: Negative for chest pain, palpitations, and leg swelling.   Gastrointestinal: Negative for abdominal pain, constipation, diarrhea, nausea, vomiting.   Genitourinary:  + for dysuria, frequency   Musculoskeletal:  + generalized weakness. Negative for back pain and myalgias.   Skin: Negative for itching and rash.   Neurological: Negative for dizziness, headaches.   Psychiatric/Behavioral: Negative for depression. The patient is not nervous/anxious.      24 hour Vital Sign Range   Temp:  [98.3 °F (36.8 °C)-98.9 °F (37.2 °C)]   Pulse:  [62-84]   Resp:  [18-20]   BP: (115-139)/(54-70)   SpO2:  [94 %-99 %]     PEx  Constitutional: Patient appears debilitated.  No distress noted  HENT:   Head: Normocephalic and atraumatic.   Eyes: Pupils are equal, round  Neck: Normal range of motion. Neck supple.   Cardiovascular: Normal rate, regular rhythm and normal heart sounds.    Pulmonary/Chest: Effort normal and breath sounds are clear with diminished bases.  Supplemental oxygen in progress  Abdominal: Soft. Bowel sounds are normal.   Musculoskeletal: Normal range of motion.   Neurological: Alert and oriented to person, place, and time.   Psychiatric: Normal mood and affect. Behavior is normal.   Skin: Skin is warm and dry. Full skin assessment to be completed with next assessment.  PVD changes to bilateral lower extremities    No results for input(s): GLUCOSE, NA, K, CL, CO2, BUN, CREATININE, MG in the last 24 hours.    Invalid input(s):  CALCIUM    Recent Labs   Lab 02/21/22  1606   WBC 13.94*   RBC 3.88*   HGB 11.2*   HCT 35.2*   PLT  217   MCV 91   MCH 28.9   MCHC 31.8*         Assessment and Plan:    NSTEMI (non-ST elevated myocardial infarction)  Coronary artery disease,  multivessel with history of previous PCI  - Recent hx of increasing episodes of angina requiring more frequent Nitroglycerin use and underwent LHC at OSH.   -Recent Left heart cath[02/11/22] showed:  · The RPAV lesion was 100% stenosed.  · The RPDA lesion was 100% stenosed.  · The Prox Cx to Mid Cx lesion was 80% stenosed.  · The 1st LPL lesion was 90% stenosed.  · The Prox RCA-2 lesion was 70% stenosed.  · The Prox RCA-1 lesion was 90% stenosed.  · The Mid LAD-2 lesion was 60% stenosed.  - continue 81 mg ASA qd, Atorvastatin 80mg qhs, Metoprolol 50 mg XL, Ticagrelor 90mg bid  - LHC deferred given rectus sheath hematoma. Plan for IC intervention 1-2 weeks after admission for outpatient.  Coronary atherosclerosis    Acute on chronic diastolic congestive heart failure  - TTE notable for Grade II left ventricular diastolic dysfunction, EF 65%  - continue Lasix 40 mg BID, lisinopril 10 mg daily  - Cardiac diet with Fluid restriction at 1.5L with strict I/Os and daily STANDING weights    Essential hypertension, benign  - home regimen with amlodipine 5 mg daily, benazepril 40 mg daily  - continue amlodipine 10 mg daily, lisinopril 10 mg daily, metoprolol XL 50 mg daily    COPD/emphysema  - Known history of severe COPD (GOLD IV D PFT 2020). On home oxygen (2-3L)  - Home medications: albuterol (VENTOLIN HFA) 90 mcg/actuation inhaler, ipratropium-albuteroL (COMBIVENT RESPIMAT)  mcg/actuation inhaler,  umeclidinium (INCRUSE ELLIPTA) 62.5 mcg/actuation inhalation capsule, fluticasone-salmeterol diskus inhaler 250-50 mcg  - continue Breo and Spiriva inhaler   - initiated DuoNebs PRN    DM type 2, controlled, with complication  - last A1c 5.2 on 01/27/2022  - diabetic diet, Accu-Cheks AC/HS  - continue LDSSI PRN      CKD (chronic kidney disease), stage III  - Baseline sCr 1.5- 1.8    - continue to monitor twice weekly BMPs, avoid nephrotoxic agents, renally dose medications when appropriate    Dysuria  - UA with reflex ordered    Gastroesophageal reflux disease without esophagitis  - continue Protonix 40 mg daily    Hypercholesterolemia  - continue Atorvastatin 40mg    Obesity  - BMI currently 33.94  - RD following, weight loss encouraged    Rectus sheath hematoma  - Patient had severe abdominal pain the day before with a mass on the abdomen. Patient was unable to have a BM yesterday. KUB was drawn and showed a large stool burden. Continued bowel regimen but pain was persistent. CT abdomen without contrast and lactic drawn. CT abdomen showed rectal sheath hematoma extending to the pelvis. IR consulted and recommended CTA of the abdomen to identify bleeding vessel. Due to kidney function, held off on CTA for concerns of contrast induced nephropathy as patient had a Alfredo score of 16 with 100cc of contrast, 15 with 75 cc contrast. IR consulted and following. Hgb went from 13 on admission --> 10.6 --> 9.9 --> 8.9. hematoma was expanding. IR performed embolization of inferior epigastric and collateral vessels.   - continue to monitor abdominal mass     Venous stasis  - Chronic history of venous stasis of bilateral lower extremities limited to breakdown of skin without varicose veins  - Patient denies increase in leg swelling over her baseline  - wound care RN following    Vitamin-D deficiency  - continue ergocalciferol 72582 units weekly    Debility   - Continue with PT/OT for gait training and strengthening and restoration of ADL's   - Encourage mobility, OOB in chair, and early ambulation as appropriate  - Fall precautions   - Monitor for bowel and bladder dysfunction  - Monitor for and prevent skin breakdown and pressure ulcers  - Continue DVT prophylaxis with ASA/ticagrelor, frequent ambulation         Anticipate disposition:  Home with home health      Follow-up needed during SNF stay-  interventional cardiology (3/17)    Follow-up needed after discharge from SNF: PCP,     Future Appointments   Date Time Provider Department Center   3/17/2022 10:40 AM Scott Chappell MD [facility] JEYSON Muniz [facility]   4/27/2022 11:20 AM Antonieta Marquez NP [facility]         I certify that SNF services are required to be given on an inpatient basis because Marisol Kerr needs for skilled nursing care and/or skilled rehabilitation are required on a daily basis and such services can only practically be provided in a skilled nursing facility setting and are for an ongoing condition for which she received inpatient care in the hospital.     Total time of the visit 115 minutes [id number]  Non physical exam/ non charting time: 68 minutes   Description of non physical exam/non charting time: counseling patient on clinical conditions and therapies provided regarding defer min of left heart catheterization and follow-up with Dr. Chappell on 03/17, importance of reporting any chest pain, antihypertensive medication regimen, home O2, beginning of discharge planning.  Extensive chart review completed including all consultation notes.  All pertinent laboratory and radiographical images reviewed.        Natalie Clemons NP  Department of Hospital Medicine   [facility]     DOS: 2/22/2022       Patient note was created using MModal Dictation.  Any errors in syntax or even information may not have been identified and edited on initial review prior to signing this note.

## 2022-02-22 NOTE — PLAN OF CARE
Problem: Adult Inpatient Plan of Care  Goal: Plan of Care Review  Outcome: Ongoing, Progressing   Recommendations     1. Continue Cardiac diet.     Goals: 1. Pt's intake meals >75% by RD follow up.  Nutrition Goal Status: new  Communication of RD Recs:  (POC)     Assessment and Plan  Nutrition Problem  Nutrition-related knowledge deficit     Related to (etiology):   Heart healthy diet     Signs and Symptoms (as evidenced by):   Pt is trying to follow heart healthy diet at home, had a number of questions about her diet.     Interventions(treatment strategy):  Nutrition education: low sodium diet  Sodium controlled diet     Nutrition Diagnosis Status:   New

## 2022-02-22 NOTE — PLAN OF CARE
Problem: Occupational Therapy Goal  Goal: Occupational Therapy Goal  Description: Goals to be met by: 3/8/22     Patient will increase functional independence with ADLs by performing:    UE Dressing with Modified [place].  LE Dressing with Supervision. Using hip kit to perform LBD.  Grooming while seated at sink with Modified [place].  Toileting from toilet or BSC with Supervision for hygiene and clothing management.   Bathing from  sitting at sink with Minimal Assistance with (A) to wash distal LEs  Supine to sit with Modified [place].  Stand pivot transfers with Supervision with RW to steady..  Upper extremity exercise with UBE  for 10 minutes to increase functional endurance with supervision.  Caregiver will be educated on level of assist required to safely perform self care tasks and functional transfers..     Outcome: Ongoing, Progressing      oral

## 2022-02-22 NOTE — PT/OT/SLP DISCHARGE
Occupational Therapy Discharge Summary    Marisol Kerr  MRN: [id number]   Principal Problem: NSTEMI (non-ST elevated myocardial infarction)      Patient Discharged from acute Occupational Therapy on 2/21/22.      Assessment:      Patient was discharged unexpectedly.  Information required to complete an accurate discharge summary is unknown.  Refer to therapy initial evaluation and last progress note for initial and most recent functional status and goal achievement.  Recommendations made may be found in medical record.    Objective:     GOALS:   Multidisciplinary Problems     Occupational Therapy Goals     Not on file          Multidisciplinary Problems (Resolved)        Problem: Occupational Therapy Goal    Goal Priority Disciplines Outcome Interventions   Occupational Therapy Goal   (Resolved)     OT, PT/OT Met    Description: Goals to be met by: 2/26/2022      Patient will increase functional independence with ADLs by performing:    LE Dressing using AE PRN with Stand-by Assistance.  Grooming while standing with Stand-by Assistance.  Toileting from toilet with Stand-by Assistance for hygiene and clothing management.   Supine to sit with Stand-by Assistance.  Step transfer with Stand-by Assistance                     Reasons for Discontinuation of Therapy Services  Transfer to alternate level of care.      Plan:     Patient Discharged to: Skilled Nursing Facility    2/22/2022

## 2022-02-22 NOTE — CONSULTS
[facility] [place] - Skilled Nursing  Adult Nutrition  Consult Note    SUMMARY     Recommendations    1. Continue Cardiac diet.    Goals: 1. Pt's intake meals >75% by RD follow up.  Nutrition Goal Status: new  Communication of RD Recs:  (POC)    Assessment and Plan  Nutrition Problem  Nutrition-related knowledge deficit    Related to (etiology):   Heart healthy diet    Signs and Symptoms (as evidenced by):   Pt is trying to follow heart healthy diet at home, had a number of questions about her diet.    Interventions(treatment strategy):  Nutrition education: low sodium diet  Sodium controlled diet    Nutrition Diagnosis Status:   New      Malnutrition Assessment                 Orbital Region (Subcutaneous Fat Loss): well nourished  Upper Arm Region (Subcutaneous Fat Loss): well nourished  Thoracic and Lumbar Region: well nourished   [place] Region (Muscle Loss): well nourished  Clavicle Bone Region (Muscle Loss): well nourished  Clavicle and Acromion Bone Region (Muscle Loss): well nourished  Scapular Bone Region (Muscle Loss): well nourished  Dorsal Hand (Muscle Loss): well nourished  Patellar Region (Muscle Loss): well nourished  Anterior Thigh Region (Muscle Loss): well nourished  Posterior Calf Region (Muscle Loss): well nourished   Edema (Fluid Accumulation): 1-->trace (calves)             Reason for Assessment    Reason For Assessment: consult  Diagnosis: cardiac disease (NSTEMI)  Relevant Medical History: COPD, DM2, HTN, CAD, Colon Ca  General Information Comments: Pt with 50-75% intake meals over last week in hospital, which she says is only a little bit less than usual. Pt endorses good intake and appetite currently.  lbs, wt is stable. Pt noted with gradual wt gain over the last year; pt says she lost weight for a wedding, then gained it all back. Completed NFPE today: pt appears well nourished. Educated pt on low sodium diet with pamphlet. Pt verbally acknowledged and was familiar with higher sodium  "foods to limit.  Nutrition Discharge Planning: Cardiac healthy, carbohydrate consistent diet    Nutrition Risk Screen    Nutrition Risk Screen: no indicators present    Nutrition/Diet History    Patient Reported Diet/Restrictions/Preferences: heart healthy  Typical Food/Fluid Intake: pt makes all her own meals and tries to eat heart healthy.  Food Preferences: NO FISH  Spiritual, Cultural Beliefs, Presybeterian Practices, Values that Affect Care: no (Buddhist)  Food Allergies:  (strawberry)    Anthropometrics    Temp: 98.4 °F (36.9 °C)  Height Method: Stated  Height: 5' 3" (160 cm)  Height (inches): 63 in  Weight Method: Bed Scale  Weight: 84.5 kg (186 lb 4.6 oz)  Weight (lb): 186.29 lb  Ideal Body Weight (IBW), Female: 115 lb  % Ideal Body Weight, Female (lb): 161.99 %  BMI (Calculated): 33       Lab/Procedures/Meds    Pertinent Labs Reviewed: reviewed  Pertinent Labs Comments: H/H 9.2/29.7, A1c 5.6 (2/12/22)  Pertinent Medications Reviewed: reviewed  Pertinent Medications Comments: coenzyme Q10, senna, colace, omega 3, lasix, vitamin D, protonix    Physical Findings/Assessment         Estimated/Assessed Needs    Weight Used For Calorie Calculations: 84.5 kg (186 lb 4.6 oz)  Energy Calorie Requirements (kcal): 1445 kcal  Energy Need Method: [place]-St Jeor (PAL 1.10)  Protein Requirements: 76-93g  Weight Used For Protein Calculations: 84.5 kg (186 lb 4.6 oz)        RDA Method (mL): 1445  CHO Requirement: 181g      Nutrition Prescription Ordered    Current Diet Order: Cardiac    Evaluation of Received Nutrient/Fluid Intake    Comments: last BM 2/21  % Intake of Estimated Energy Needs: 75 - 100 %  % Meal Intake: 75 - 100 %    Nutrition Risk    Level of Risk/Frequency of Follow-up: low       Monitor and Evaluation    Food and Nutrient Intake: energy intake, food and beverage intake  Food and Nutrient Adminstration: diet order  Knowledge/Beliefs/Attitudes: food and nutrition knowledge/skill  Anthropometric Measurements: " weight, weight change  Biochemical Data, Medical Tests and Procedures: electrolyte and renal panel, gastrointestinal profile, glucose/endocrine profile, inflammatory profile, lipid profile  Nutrition-Focused Physical Findings: overall appearance, extremities, muscles and bones, head and eyes, skin       Nutrition Follow-Up    RD Follow-up?: Yes

## 2022-02-22 NOTE — PROGRESS NOTES
Received pt. From [address]. Per 2 HELP via w/c.received report from [facility].pt. oriented to routine of unit.pt. voices no complaints at this time.will continue to monitor.

## 2022-02-22 NOTE — PT/OT/SLP EVAL
Occupational Therapy   Evaluation /Treatment    Name: Marisol Kerr  MRN: [id number]  Admit Date: 2/21/2022  Admitting Diagnosis:  NSTEMI ( Non -ST elevated myocardial Infarction )  Recent Surgeries: ANGIOGRAM, CORONARY, WITH LEFT HEART CATHETERIZATION    General Precautions: Standard, fall   Orthopedic Precautions:N/A   Braces: N/A     Recommendations:     Discharge Recommendations: home health OT  Level of Assistance Recommended: Intermittent assistance   Discharge Equipment Recommendations:  none (Pt has BSC, shower chair, grab bars, a RW, and a transport chair.)  Barriers to discharge:  Decreased caregiver support    Assessment:     Marisol Kerr is a 76 y.o. female with a medical diagnosis of NSTEMI, Non-NAYE elevated Myocardial infarction .   She remains limited in performance of self-care , functional mobility and ADLs and currently not performing tasks at [facility] . Currently presenting with performance deficits including weakness, impaired endurance, impaired self care skills, impaired functional mobilty, gait instability, impaired balance, decreased coordination, decreased lower extremity function, decreased ROM, impaired skin, impaired cardiopulmonary response to activity.   Pt tolerated Tx without incident but assist needed to perform self care tasks, functional mobility and functional transfers . CGA required when performing functional transfers and (A) required to perform LBD , bathing and toileting.  She would continue to benefit from OT intervention to further her functional (I)ce and safety.    Rehab Potential is good    Activity Tolerance: Good    Plan:     Patient to be seen 6 x/week to address the above listed problems via self-care/home management, therapeutic activities, therapeutic exercises  · Plan of Care Expires: 03/22/22  · Plan of Care Reviewed with: patient    Subjective     Chief Complaint: None   Patient/Family Comments/goals: Pt wants to return to [facility]    Occupational Profile:  Living  Environment: Pt lives alone in a single story home with threshold to enter. She has a walk in shower with grab bars and a standard height toilet with BSC positioned over it. Pt was on 2 liter O2 continuously.  Previous level of function: Pt was (I)ly performing self care tasks and functional mobility required a SC..   Roles and Routines: (I) home dweller   Equipment Used at Home:   RW, GB , hand held shower, transport chair, BSC , SC and shower chair.  Assistance upon Discharge: Family members with assist post D/C per Pt.     Pain/Comfort:  · Pain Rating 1: 0/10  · Pain Rating Post-Intervention 1: 0/10    Patients cultural, spiritual, Adventist conflicts given the current situation: no    Objective:     Communicated with: nurse prior to session.  Patient found supine with oxygen upon OT entry to room.    Occupational Performance:       Eating   Assistance Needed Set-up / clean-up   Physical Assistance Level No physical assistance   CARE Score - Eating 5   Oral Hygiene   Assistance Needed Supervision   Physical Assistance Level No physical assistance  (with all grooming performed seated sinkside.)   CARE Score - Oral Hygiene 4   Toileting Hygiene   Assistance Needed Physical assistance   Physical Assistance Level 26%-50%  (with (A) to steady when standing to perform pablito care and to manage clothing over her bottom.)   CARE Score - Toileting Hygiene 3   Shower/Bathe Self   Assistance Needed Physical assistance   Physical Assistance Level 26%-50%  (with (A) to wash her lower legs and to steady when standing to perform pablito care.)   CARE Score - Shower/Bathe Self 3   Upper Body Dressing   Assistance Needed Set-up / clean-up   Physical Assistance Level No physical assistance   CARE Score - Upper Body Dressing 5   Lower Body Dressing   Assistance Needed Physical assistance   Physical Assistance Level 26%-50%  (with (A) to thread feet into pant legs to steady whe standing and to pull pants up in back.)   CARE Score -  Lower Body Dressing 3   Putting On/Taking Off Footwear   Assistance Needed Physical assistance   Physical Assistance Level 76% or more  (A/E will be required to don /doff socks and shoes.)   CARE Score - Putting On/Taking Off Footwear 2   Roll Left and Right   Assistance Needed Supervision   Physical Assistance Level No physical assistance  (with HOB flat and Pt using bed rails to assist.)   CARE Score - Roll Left and Right 4   Sit to Lying   Assistance Needed Incidental touching   Physical Assistance Level 25% or less   CARE Score - Sit to Lying 3   Lying to Sitting on Side of Bed   Assistance Needed Incidental touching   Physical Assistance Level No physical assistance  (with HOB flat and Pt using bed rails to assist.)   CARE Score - Lying to Sitting on Side of Bed 4   Sit to Stand   Assistance Needed Incidental touching   Physical Assistance Level   (CGA when performing transition from sitting to standing.)   CARE Score - Sit to Stand 4   Chair/Bed-to-Chair Transfer   Assistance Needed Incidental touching   Physical Assistance Level   (CGA when performing transition from sitting to standing.)   CARE Score - Chair/Bed-to-Chair Transfer 4   Toilet Transfer   Assistance Needed Incidental touching   Physical Assistance Level   (CGA when performing transition from sitting to standing.)   CARE Score - Toilet Transfer 4       Cognitive/Visual Perceptual:  Cognitive/Psychosocial Skills:     -       Oriented to: Person, Place, Time and Situation   -       Follows Commands/attention:Follows multistep  commands  -       Communication: clear/fluent  -       Memory: No Deficits noted  -       Safety awareness/insight to disability: intact   -       Mood/Affect/Coping skills/emotional control: Appropriate to situation  Visual/Perceptual:      -Intact .    Physical Exam:  Balance: - Pt demonstrated Good functional sitting balance as noted when performing self care tasks.          Pt with Fair minus functional standing with RW  and CGA required for safety.   Postural examination/scapula alignment:    -       Rounded shoulders  Skin integrity: Visible skin intact  Edema:  None noted  Sensation:    -       Intact  Motor Planning: -       WFLs  Dominant hand: -       Right  Upper Extremity Range of Motion:     -       Right Upper Extremity: WFL  -       Left Upper Extremity: WFL  Upper Extremity Strength:    -       Right Upper Extremity: WFL  -       Left Upper Extremity: WFL   Strength:    -       Right Upper Extremity: WFL  -       Left Upper Extremity: WFL  Fine Motor Coordination:    -       Intact (B) UEs    AMPAC 6 Click ADL:  AMPAC Total Score: 19    Treatment & Education:  Pt edu on role of OT, POC, safety when performing self care tasks , benefit of performing OOB activity, and safety when performing functional transfers and mobility.  - White board updated  - Self care tasks completed-- as noted above   Education:    Patient left up in chair with all lines intact and call button in reach    GOALS:   Multidisciplinary Problems     Occupational Therapy Goals        Problem: Occupational Therapy Goal    Goal Priority Disciplines Outcome Interventions   Occupational Therapy Goal     OT, PT/OT Ongoing, Progressing    Description: Goals to be met by: 3/8/22     Patient will increase functional independence with ADLs by performing:    UE Dressing with Modified [place].  LE Dressing with Supervision. Using hip kit to perform LBD.  Grooming while seated at sink with Modified [place].  Toileting from toilet or BSC with Supervision for hygiene and clothing management.   Bathing from  sitting at sink with Minimal Assistance with (A) to wash distal LEs  Supine to sit with Modified [place].  Stand pivot transfers with Supervision with RW to steady..  Upper extremity exercise with UBE  for 10 minutes to increase functional endurance with supervision.  Caregiver will be educated on level of assist required to safely perform self  care tasks and functional transfers..                      History:     Past Medical History:   Diagnosis Date    CAD (coronary artery disease)     Cancer     colon    CHF (congestive heart failure)     Colitis     Colon cancer     COPD (chronic obstructive pulmonary disease)     Decreased hearing     Diabetes mellitus     Diabetes mellitus, type 2     Hypercholesterolemia     Hypertension     Hypoxemia     Insomnia     Obesity     Osteoarthritis        Past Surgical History:   Procedure Laterality Date    ANGIOGRAM, CORONARY, WITH LEFT HEART CATHETERIZATION N/A 2/10/2022    Procedure: Angiogram, Coronary, with Left Heart Cath;  Surgeon: Cristian Benjamin MD;  Location: [facility] CATH[facility]EP LAB;  Service: Cardiology;  Laterality: N/A;    CARDIAC CATHETERIZATION      CHOLECYSTECTOMY      COLECTOMY      CORONARY ANGIOPLASTY      CORONARY STENT PLACEMENT      EXTERNAL EAR SURGERY      EYE SURGERY      FLEXIBLE SIGMOIDOSCOPY N/A 9/19/2019    Procedure: SIGMOIDOSCOPY, FLEXIBLE;  Surgeon: Biju Kramer III, MD;  Location: [facility] ENDO;  Service: Endoscopy;  Laterality: N/A;    HYSTERECTOMY      partial       Time Tracking:     OT Date of Treatment: 02/22/22  OT Start Time: 1000  OT Stop Time: 1100  OT Total Time (min): 60 min    Billable Minutes:Evaluation 20  Self Care/Home Management 40    2/22/2022

## 2022-02-22 NOTE — PLAN OF CARE
Problem: Physical Therapy Goal  Goal: Physical Therapy Goal  Description: Goals to be met by: 3/8/22    Patient will increase functional independence with mobility by performing:    . Supine to sit with [place]  . Sit to supine with [place]  . Rolling to Left and Right with [place].  . Sit to stand transfer with Supervision  . Bed to chair transfer with Supervision using Rolling Walker  . Gait  x 100 feet with Supervision using Rolling Walker.   . Wheelchair propulsion x50 feet with Supervision using bilateral uppper extremities  . Ascend/Descend 4 inch curb step with Contact Guard Assistance using Rolling Walker.  . Retrieve object off floor with RW and reacher and SBA.    Outcome: Ongoing, Progressing

## 2022-02-22 NOTE — PT/OT/SLP EVAL
Physical Therapy Evaluation    Patient Name:  Marisol Kerr   MRN:  [id number]  Admit Date: 2/21/2022  Admitting Diagnosis: NSTEMI (non-ST elevated myocardial infarction)  Recent Surgeries: N/A    General Precautions: Standard, fall, hearing impaired   Orthopedic Precautions:N/A   Braces: N/A     Recommendations:     Discharge Recommendations:  home health PT   Level of Assistance Recommended: Intermittent assistance   Discharge Equipment Recommendations: none   Barriers to discharge: Decreased caregiver support    Assessment:     Marisol Kerr is a 76 y.o. female admitted with a medical diagnosis of NSTEMI (non-ST elevated myocardial infarction) .  Performance deficits affecting function  weakness, impaired endurance, impaired self care skills, impaired functional mobilty, gait instability, impaired balance, decreased lower extremity function, impaired cardiopulmonary response to activity . Pt uses O2 at home- O2 sats above 96% during PT session. Pt was Mod I PTA and now requires Maira for bed mobility and CGA for transfers and limited GT distance. Pt will benefit from continued snf rehab to help pt gain (I) prior to d/c home.    Rehab Potential is good     Activity Tolerance: Good    Plan:     Patient to be seen 6 x/week to address the above listed problems via gait training, therapeutic activities, therapeutic exercises, neuromuscular re-education, wheelchair management/training     Plan of Care Expires: 03/08/22  Plan of Care Reviewed with: patient    Subjective     Chief Complaint: none noted by pt.  Patient/Family Comments/goals: to gain (I)  Pain/Comfort:  Pain Rating 1: 0/10  Pain Rating Post-Intervention 1: 0/10    Living Environment: (pt. Confirmed PT acute eval to be correct)    The patient lives alone, [facility], [address] [facility], [place]. Does not drive.   Prior to admission, patients level of function was modified independent with use of canes, rollator does not fit into bathroom.  Equipment used at home: shower  chair,rollator,cane, straight (hurrycane), transport w/c.  DME owned (not currently used): none.  Upon discharge, patient will have assistance from- limited family support.    Patients cultural, spiritual, Hoahaoism conflicts given the current situation: no    Objective:     Communicated with pt's nurse prior to session.  Patient found up in chair with oxygen (pt on 2 LO2)  upon PT entry to room.    Exams:  · Cognitive Exam:  Patient is oriented to Person, Place, Time and Situation  · Gross Motor Coordination:  WFL  · Sensation:    · -       Intact  · Skin Integrity/Edema:      · -       Skin integrity: Visible skin intact  · RLE ROM: WFL  · RLE Strength: grossly 4/5  · LLE ROM: WFL  · LLE Strength: grossly 4/5    Functional Mobility:     02/22/22 1154   Prior Functioning: Everyday Activities   Self Care Independent   Indoor Mobility (Ambulation) Independent   Stairs Independent   Functional Cognition Independent   Prior Device Use Walker   Roll Left and Right   Assistance Needed Supervision   Comment per OT eval   CARE Score - Roll Left and Right 4   Sit to Lying   Physical Assistance Level 25% or less   Comment per OT eval   CARE Score - Sit to Lying 3   Lying to Sitting on Side of Bed   Assistance Needed Incidental touching   Comment per OT eval   CARE Score - Lying to Sitting on Side of Bed 4   Sit to Stand   Assistance Needed Incidental touching   Comment with RW   CARE Score - Sit to Stand 4   Chair/Bed-to-Chair Transfer   Assistance Needed Incidental touching   Comment with RW, SPT   CARE Score - Chair/Bed-to-Chair Transfer 4   Chair/Bed-to-Chair Transfer Discharge Goal   Discharge Goal 5   Car Transfer   Reason if not Attempted Safety concerns   CARE Score - Car Transfer 88   Walk 10 Feet   Assistance Needed Incidental touching   Comment with RW, FFT, decrease step length and decrease esther.   CARE Score - Walk 10 Feet 4   Walk 50 Feet with Two Turns   Comment pt stopped at 38ft 2* fatigue and SOB. pt's  O2 sats on 2 LO2= 96%-100% post GT.   Reason if not Attempted Safety concerns   CARE Score - Walk 50 Feet with Two Turns 88   Walk 150 Feet   Reason if not Attempted Safety concerns   CARE Score - Walk 150 Feet 88   Walking 10 Feet on Uneven Surfaces   Reason if not Attempted Safety concerns   CARE Score - Walking 10 Feet on Uneven Surfaces 88   1 Step (Curb)   Reason if not Attempted Safety concerns   CARE Score - 1 Step (Curb) 88   4 Steps   Reason if not Attempted Safety concerns   CARE Score - 4 Steps 88   12 Steps   Reason if not Attempted Safety concerns   CARE Score - 12 Steps 88   Picking Up Object   Reason if not Attempted Safety concerns   CARE Score - Picking Up Object 88   Uses a Wheelchair/Scooter?   Uses a Wheelchair/Scooter? Yes   Wheel 50 Feet with Two Turns   Physical Assistance Level 26%-50%   Comment using B U/E's   CARE Score - Wheel 50 Feet with Two Turns 3   Type of Wheelchair/Scooter Manual   Wheel 150 Feet   Comment pt with increase fatigue and SOB   Reason if not Attempted Safety concerns   CARE Score - Wheel 150 Feet 88   Type of Wheelchair/Scooter Manual       Therapeutic Activities and Exercises: Mini-eliptical x 15mins to help improve b l/e MMT and endurance.    Education:   Patient provided with daily orientation and goals of this PT session.   Patient educated to transfer to bedside chair/bedside commode/bathroom with RN/PCT present.    Patient educated on Fall risk, home safety and plan of care by explanation.    Patient Verbalized Understanding.   Time provided for therapeutic counseling and discussion of current health disposition. All questions answered to satisfaction, within scope of PT practice; voiced no other concerns. White board updated in patient's room, RN notified of session.      AM-PAC 6 CLICK MOBILITY  Total Score:16     Patient left up in chair with all lines intact and call button in reach.    GOALS:   Multidisciplinary Problems     Physical Therapy Goals         Problem: Physical Therapy Goal    Goal Priority Disciplines Outcome Goal Variances Interventions   Physical Therapy Goal     PT, PT/OT Ongoing, Progressing     Description: Goals to be met by: 3/8/22    Patient will increase functional independence with mobility by performing:    . Supine to sit with [place]  . Sit to supine with [place]  . Rolling to Left and Right with [place].  . Sit to stand transfer with Supervision  . Bed to chair transfer with Supervision using Rolling Walker  . Gait  x 100 feet with Supervision using Rolling Walker.   . Wheelchair propulsion x50 feet with Supervision using bilateral uppper extremities  . Ascend/Descend 4 inch curb step with Contact Guard Assistance using Rolling Walker.  . Retrieve object off floor with RW and reacher and SBA.                     History:     Past Medical History:   Diagnosis Date    CAD (coronary artery disease)     Cancer     colon    CHF (congestive heart failure)     Colitis     Colon cancer     COPD (chronic obstructive pulmonary disease)     Decreased hearing     Diabetes mellitus     Diabetes mellitus, type 2     Hypercholesterolemia     Hypertension     Hypoxemia     Insomnia     Obesity     Osteoarthritis        Past Surgical History:   Procedure Laterality Date    ANGIOGRAM, CORONARY, WITH LEFT HEART CATHETERIZATION N/A 2/10/2022    Procedure: Angiogram, Coronary, with Left Heart Cath;  Surgeon: Cristian Benjamin MD;  Location: [facility] CATH/EP LAB;  Service: Cardiology;  Laterality: N/A;    CARDIAC CATHETERIZATION      CHOLECYSTECTOMY      COLECTOMY      CORONARY ANGIOPLASTY      CORONARY STENT PLACEMENT      EXTERNAL EAR SURGERY      EYE SURGERY      FLEXIBLE SIGMOIDOSCOPY N/A 9/19/2019    Procedure: SIGMOIDOSCOPY, FLEXIBLE;  Surgeon: Biju Kramer III, MD;  Location: [facility] ENDO;  Service: Endoscopy;  Laterality: N/A;    HYSTERECTOMY      partial       Time Tracking:     PT Received On: 02/22/22  PT  Start Time: 1130     PT Stop Time: 1205  PT Total Time (min): 35 min     Billable Minutes: Evaluation 20mins and Therapeutic Exercise 15mins      02/22/2022

## 2022-02-22 NOTE — PLAN OF CARE
[facility] - Skilled Nursing  Initial Discharge Assessment     CM completed pt's initial d/c assessment at the bedside. Pt alert and oriented. Prior to admit, pt lived alone and was independent with ADLs. She has 1 NAYE. Marisol Kerr (Daughter) [phone] will provide d/c tranpsortation. She states she has a RW. Her preferred local pharmacy is [facility]. Pt did voice that she hasn't been sleeping well and does feel down because she is at SNF and not able to go home right now. She understands that she is here to get stronger before transitioning home.    CM will cont to follow for d/c planning.     Primary Care Provider: Khadar Dwyer MD    Admission Diagnosis: NSTEMI    Admission Date: 2/21/2022  Expected Discharge Date:     Discharge Barriers Identified: (P) None    Payor: MEDICARE / Plan: MEDICARE PART A & B / Product Type: Government /     Extended Emergency Contact Information  Primary Emergency Contact: Marisol Kerr  Address: [address] [place] [address]            [place] [address] [place]  Mobile Phone: [phone]  Relation: Daughter  Preferred language: English   needed? No    Discharge Plan A: (P) Home, Home Health  Discharge Plan B: (P) Home with family, Home Health      [facility] [place] - [address]  [address] [place] [address]  Phone: [phone] Fax: [phone]    [address] [address]  Phone: [phone] Fax: [phone]      Initial Assessment (most recent)       Adult Discharge Assessment - 02/22/22 1528          Discharge Assessment    Assessment Type Discharge Planning Assessment (P)      Source of Information patient (P)      Lives With alone (P)      Do you expect to return to your current living situation? Yes (P)      Prior to hospitilization cognitive status: Alert/Oriented (P)      Current cognitive status: Alert/Oriented (P)      Walking or  Climbing Stairs Difficulty -- (P)    tbd    Dressing/Bathing Difficulty -- (P)    tbd    Home Layout -- (P)    1 NAYE    Equipment Currently Used at Home walker, rolling (P)      Do you take prescription medications? Yes (P)      Do you have prescription coverage? Yes (P)      Do you have any problems affording any of your prescribed medications? No (P)      Is the patient taking medications as prescribed? yes (P)      Who is going to help you get home at discharge? Marisol Kerr (Daughter) [phone] (P)      How do you get to doctors appointments? family or friend will provide (P)      Are you on dialysis? No (P)      Do you take coumadin? No (P)      Discharge Plan A Home;Home Health (P)      Discharge Plan B Home with family;Home Health (P)      DME Needed Upon Discharge  -- (P)    TBD    Discharge Plan discussed with: Patient (P)      Discharge Barriers Identified None (P)                  Jessie Ching RN Case Manager  [facility]  [phone]

## 2022-02-22 NOTE — PLAN OF CARE
[facility] - Skilled Nursing  Discharge Final Note    Primary Care Provider: Khadar Dwyer MD    Expected Discharge Date:     Final Discharge Note (most recent)     Final Note - 02/22/22 0847        Final Note    Assessment Type Final Discharge Note     Anticipated Discharge Disposition Skilled Nursing Facility     Hospital Resources/Appts/Education Provided Provided patient/caregiver with written discharge plan information                 Important Message from Medicare             Pt d/c to [facility].    Julie Haase RN  Case Management [phone]    Future Appointments   Date Time Provider Department Center   3/17/2022 10:40 AM Scott Chappell MD [facility]   4/27/2022 11:20 AM Antonieta Marquez NP [facility]

## 2022-02-23 PROCEDURE — 11000004 HC SNF PRIVATE

## 2022-02-23 PROCEDURE — 94761 N-INVAS EAR/PLS OXIMETRY MLT: CPT

## 2022-02-23 PROCEDURE — 97110 THERAPEUTIC EXERCISES: CPT | Mod: CO

## 2022-02-23 PROCEDURE — 97530 THERAPEUTIC ACTIVITIES: CPT | Mod: CQ

## 2022-02-23 PROCEDURE — 25000003 PHARM REV CODE 250: Performed by: HOSPITALIST

## 2022-02-23 PROCEDURE — 97116 GAIT TRAINING THERAPY: CPT | Mod: CQ

## 2022-02-23 PROCEDURE — 99900035 HC TECH TIME PER 15 MIN (STAT)

## 2022-02-23 PROCEDURE — 97110 THERAPEUTIC EXERCISES: CPT | Mod: CQ

## 2022-02-23 PROCEDURE — 27000221 HC OXYGEN, UP TO 24 HOURS

## 2022-02-23 RX ORDER — IPRATROPIUM BROMIDE AND ALBUTEROL SULFATE 2.5; .5 MG/3ML; MG/3ML
3 SOLUTION RESPIRATORY (INHALATION) EVERY 4 HOURS PRN
Status: DISCONTINUED | OUTPATIENT
Start: 2022-02-23 | End: 2022-03-16 | Stop reason: HOSPADM

## 2022-02-23 RX ADMIN — LISINOPRIL 10 MG: 10 TABLET ORAL at 09:02

## 2022-02-23 RX ADMIN — PANTOPRAZOLE SODIUM 40 MG: 40 TABLET, DELAYED RELEASE ORAL at 09:02

## 2022-02-23 RX ADMIN — TICAGRELOR 90 MG: 90 TABLET ORAL at 09:02

## 2022-02-23 RX ADMIN — ASPIRIN 81 MG CHEWABLE TABLET 81 MG: 81 TABLET CHEWABLE at 09:02

## 2022-02-23 RX ADMIN — FLUTICASONE FUROATE AND VILANTEROL TRIFENATATE 1 PUFF: 100; 25 POWDER RESPIRATORY (INHALATION) at 09:02

## 2022-02-23 RX ADMIN — HYDROCODONE BITARTRATE AND ACETAMINOPHEN 1 TABLET: 5; 325 TABLET ORAL at 09:02

## 2022-02-23 RX ADMIN — METOPROLOL SUCCINATE 50 MG: 50 TABLET, EXTENDED RELEASE ORAL at 09:02

## 2022-02-23 RX ADMIN — ATORVASTATIN CALCIUM 80 MG: 20 TABLET, FILM COATED ORAL at 09:02

## 2022-02-23 RX ADMIN — AMLODIPINE BESYLATE 10 MG: 10 TABLET ORAL at 09:02

## 2022-02-23 RX ADMIN — Medication 1 CAPSULE: at 09:02

## 2022-02-23 RX ADMIN — Medication 6 MG: at 09:02

## 2022-02-23 RX ADMIN — TRIAMCINOLONE ACETONIDE: 1 CREAM TOPICAL at 09:02

## 2022-02-23 RX ADMIN — TIOTROPIUM BROMIDE INHALATION SPRAY 2 PUFF: 3.12 SPRAY, METERED RESPIRATORY (INHALATION) at 09:02

## 2022-02-23 RX ADMIN — FUROSEMIDE 40 MG: 40 TABLET ORAL at 09:02

## 2022-02-23 RX ADMIN — HYDROCODONE BITARTRATE AND ACETAMINOPHEN 1 TABLET: 5; 325 TABLET ORAL at 05:02

## 2022-02-23 RX ADMIN — Medication 100 MG: at 09:02

## 2022-02-23 NOTE — PT/OT/SLP PROGRESS
"Occupational Therapy   Treatment    Name: Marisol Kerr  MRN: [id number]  Admit Date: 2/21/2022  Admitting Diagnosis: NSTEMI ( Non -ST elevated myocardial Infarction )  General Precautions: Standard, fall   Orthopedic Precautions:N/A   Braces:       Recommendations:     Discharge Recommendations: home health OT  Level of Assistance Recommended at Discharge: Intermittent assistance for ADL's and homemaking tasks  Discharge Equipment Recommendations:  none (Pt has BSC, shower chair, grab bars, a RW, and a transport chair.)  Barriers to discharge:  Decreased caregiver support    Assessment:     Marisol Kerr is a 76 y.o. female with a medical diagnosis of NSTEMI ( Non -ST elevated myocardial Infarction ) .Performance deficits affecting function are weakness, impaired endurance, impaired self care skills, impaired functional mobilty, gait instability, impaired balance, decreased coordination, decreased lower extremity function, decreased ROM, impaired skin, impaired cardiopulmonary response to activity. Pt. participated well with session on this day. Pt is progressing well with session on this day still continues to requires cues with aspects of safety . Pt. Will continue to benefit from continued OT to progress towards goals    Rehab Potential is good    Activity tolerance:  Good    Plan:     Patient to be seen 6 x/week to address the above listed problems via self-care/home management, therapeutic activities, therapeutic exercises    · Plan of Care Expires: 03/22/22  · Plan of Care Reviewed with: patient    Subjective     Communicated with: latasha prior to session. "Im ready"    Pain/Comfort:  · Pain Rating 1: 0/10  · Pain Rating Post-Intervention 1: 0/10    Patient's cultural, spiritual, Cheondoism conflicts given the current situation:  · no (Latter-day)    Objective:     Patient found up in chair with oxygen upon OT entry to room.    Functional Mobility/Transfers:  · Patient completed Sit <> Stand Transfer with stand by " assistance  with  rolling walker   · Patient completed Toilet Transfer Step Transfer technique with contact guard assistance with  rolling walker  · Functional Mobility: Pt. With fxl mobility throughout room and bathroom with RW and CGA/SBA. No LOB noted    Activities of Daily Living:  · Not tested. Pt. Performed prior to therapy session    [facility] 6 Click ADL: 19    OT Exercises: UE Ergometer 10 mins with min resistance    Treatment & Education:  Pt. With standing act on this day with task. Pt. With CGA/SBA for balance aspects with task with no AD at raised counter Pt with fine motor task x 6 min with standing bal and min cues throught out. Pt.  able to complete finish task with no seated rest break required.     Pt. With 2# dowel activity with 2x20 reps with  shd flex, bicep curls horz adb/add and forward flex motion to increase BUE ROM and strength,.     Pt. With standing and therex performed to increase ROM, endurance selfcare task and fxl mobility for independence     Patient left up in chair with all lines intact and call button in reachEducation:      GOALS:   Multidisciplinary Problems     Occupational Therapy Goals        Problem: Occupational Therapy Goal    Goal Priority Disciplines Outcome Interventions   Occupational Therapy Goal     OT, PT/OT Ongoing, Progressing    Description: Goals to be met by: 3/8/22     Patient will increase functional independence with ADLs by performing:    UE Dressing with Modified [place].  LE Dressing with Supervision. Using hip kit to perform LBD.  Grooming while seated at sink with Modified [place].  Toileting from toilet or BSC with Supervision for hygiene and clothing management.   Bathing from  sitting at sink with Minimal Assistance with (A) to wash distal LEs  Supine to sit with Modified [place].  Stand pivot transfers with Supervision with RW to steady..  Upper extremity exercise with UBE  for 10 minutes to increase functional endurance with  supervision.  Caregiver will be educated on level of assist required to safely perform self care tasks and functional transfers..                      Time Tracking:     OT Date of Treatment: OT Date of Treatment: 02/23/22  OT Total Time (min): Total Time (min): 40 min    Billable Minutes:Therapeutic Exercise 40    2/23/2022   OT and TYLER have discussed the above patients goals and status in collaboration with Plan of Care.

## 2022-02-23 NOTE — PT/OT/SLP PROGRESS
"Physical Therapy Treatment    Patient Name:  Marisol Kerr   MRN:  [id number]  Admit Date: 2/21/2022  Admitting Diagnosis: NSTEMI (non-ST elevated myocardial infarction)  Recent Surgeries:    General Precautions: Standard, fall, hearing impaired   Orthopedic Precautions:N/A   Braces:       Recommendations:     Discharge Recommendations:  home health PT   Level of Assistance Recommended at Discharge: Intermittent assistance   Discharge Equipment Recommendations: none   Barriers to discharge: Decreased caregiver support    Assessment:     Marisol Kerr is a 76 y.o. female admitted with a medical diagnosis of NSTEMI (non-ST elevated myocardial infarction) Pt tolerated well, pt is very pleasant, demo good effort, pt would continue to benefit from skilled PT services to improve overall functional mobility, strength and endurance.    Performance deficits affecting function:  weakness, impaired endurance, impaired self care skills, impaired functional mobilty, gait instability, impaired balance, decreased lower extremity function, impaired cardiopulmonary response to activity .    Rehab Potential is good    Activity Tolerance: Fair    Plan:     Patient to be seen 6 x/week to address the above listed problems via gait training, therapeutic activities, therapeutic exercises, neuromuscular re-education, wheelchair management/training    · Plan of Care Expires: 03/08/22  · Plan of Care Reviewed with: patient    Subjective     "feel fine, except my feet" burn and numbness nsg and MD aware per pt.     Pain/Comfort:  · Pain Rating 1: 0/10  · Pain Rating Post-Intervention 1: 0/10    Patient's cultural, spiritual, Anabaptist conflicts given the current situation:  · no    Objective:      Patient found with oxygen (2L) upon PT entry to room.     Therapeutic Activities and Exercises: 2x10 reps AP,LAQ mini elliptical x 10 min with rest breaks as needed    Patient Education: Education on the importance/benefits of PT services, Safety/proper " tech with trfs/gait, On the importance of increasing OOB tolerance/prolong affects of bed rest, Use of the call button for A with OOB mobility/use of AD/fall risk.      Functional Mobility:  · Transfers:     · Sit to Stand:  contact guard assistance with rolling walker    Gait: amb with RW CGA ~ 50 ft x 2 trials seated rest break wc/02 in tow no LOB    AM-PAC 6 CLICK MOBILITY  16    Patient left up in chair with all lines intact, call button in reach and belonging sin reach.    GOALS:   Multidisciplinary Problems     Physical Therapy Goals        Problem: Physical Therapy Goal    Goal Priority Disciplines Outcome Goal Variances Interventions   Physical Therapy Goal     PT, PT/OT Ongoing, Progressing     Description: Goals to be met by: 3/8/22    Patient will increase functional independence with mobility by performing:    . Supine to sit with [place]  . Sit to supine with [place]  . Rolling to Left and Right with [place].  . Sit to stand transfer with Supervision  . Bed to chair transfer with Supervision using Rolling Walker  . Gait  x 100 feet with Supervision using Rolling Walker.   . Wheelchair propulsion x50 feet with Supervision using bilateral uppper extremities  . Ascend/Descend 4 inch curb step with Contact Guard Assistance using Rolling Walker.  . Retrieve object off floor with RW and reacher and SBA.                     Time Tracking:     PT Received On: 02/23/22  PT Total Time (min):       Billable Minutes: Gait Training 15, Therapeutic Activity 13 and Therapeutic Exercise 10    Treatment Type: Treatment  PT/PTA: PTA     PTA Visit Number: 1 02/23/2022

## 2022-02-23 NOTE — PLAN OF CARE
Problem: Occupational Therapy Goal  Goal: Occupational Therapy Goal  Description: Goals to be met by: 3/8/22     Patient will increase functional independence with ADLs by performing:    UE Dressing with Modified [place].  LE Dressing with Supervision. Using hip kit to perform LBD.  Grooming while seated at sink with Modified [place].  Toileting from toilet or BSC with Supervision for hygiene and clothing management.   Bathing from  sitting at sink with Minimal Assistance with (A) to wash distal LEs  Supine to sit with Modified [place].  Stand pivot transfers with Supervision with RW to steady..  Upper extremity exercise with UBE  for 10 minutes to increase functional endurance with supervision.  Caregiver will be educated on level of assist required to safely perform self care tasks and functional transfers..     Outcome: Ongoing, Progressing

## 2022-02-24 ENCOUNTER — LAB VISIT (OUTPATIENT)
Dept: LAB | Facility: OTHER | Age: 77
End: 2022-02-24
Payer: MEDICARE

## 2022-02-24 DIAGNOSIS — Z20.822 ENCOUNTER FOR LABORATORY TESTING FOR COVID-19 VIRUS: ICD-10-CM

## 2022-02-24 LAB
ANION GAP SERPL CALC-SCNC: 12 MMOL/L (ref 8–16)
BACTERIA #/AREA URNS AUTO: ABNORMAL /HPF
BASOPHILS # BLD AUTO: 0.03 K/UL (ref 0–0.2)
BASOPHILS NFR BLD: 0.2 % (ref 0–1.9)
BILIRUB UR QL STRIP: NEGATIVE
BUN SERPL-MCNC: 94 MG/DL (ref 8–23)
CALCIUM SERPL-MCNC: 8.6 MG/DL (ref 8.7–10.5)
CHLORIDE SERPL-SCNC: 92 MMOL/L (ref 95–110)
CLARITY UR REFRACT.AUTO: ABNORMAL
CO2 SERPL-SCNC: 28 MMOL/L (ref 23–29)
COLOR UR AUTO: YELLOW
CREAT SERPL-MCNC: 3.1 MG/DL (ref 0.5–1.4)
DIFFERENTIAL METHOD: ABNORMAL
EOSINOPHIL # BLD AUTO: 0.2 K/UL (ref 0–0.5)
EOSINOPHIL NFR BLD: 1.3 % (ref 0–8)
ERYTHROCYTE [DISTWIDTH] IN BLOOD BY AUTOMATED COUNT: 13.9 % (ref 11.5–14.5)
EST. GFR  (AFRICAN AMERICAN): 16.3 ML/MIN/1.73 M^2
EST. GFR  (NON AFRICAN AMERICAN): 14.2 ML/MIN/1.73 M^2
GLUCOSE SERPL-MCNC: 84 MG/DL (ref 70–110)
GLUCOSE UR QL STRIP: NEGATIVE
HCT VFR BLD AUTO: 28.9 % (ref 37–48.5)
HGB BLD-MCNC: 9 G/DL (ref 12–16)
HGB UR QL STRIP: NEGATIVE
HYALINE CASTS UR QL AUTO: 4 /LPF
IMM GRANULOCYTES # BLD AUTO: 0.17 K/UL (ref 0–0.04)
IMM GRANULOCYTES NFR BLD AUTO: 1.3 % (ref 0–0.5)
KETONES UR QL STRIP: NEGATIVE
LEUKOCYTE ESTERASE UR QL STRIP: ABNORMAL
LYMPHOCYTES # BLD AUTO: 1 K/UL (ref 1–4.8)
LYMPHOCYTES NFR BLD: 7.2 % (ref 18–48)
MAGNESIUM SERPL-MCNC: 1.4 MG/DL (ref 1.6–2.6)
MCH RBC QN AUTO: 28 PG (ref 27–31)
MCHC RBC AUTO-ENTMCNC: 31.1 G/DL (ref 32–36)
MCV RBC AUTO: 90 FL (ref 82–98)
MICROSCOPIC COMMENT: ABNORMAL
MONOCYTES # BLD AUTO: 0.9 K/UL (ref 0.3–1)
MONOCYTES NFR BLD: 7 % (ref 4–15)
NEUTROPHILS # BLD AUTO: 11.1 K/UL (ref 1.8–7.7)
NEUTROPHILS NFR BLD: 83 % (ref 38–73)
NITRITE UR QL STRIP: NEGATIVE
NRBC BLD-RTO: 0 /100 WBC
PH UR STRIP: 5 [PH] (ref 5–8)
PHOSPHATE SERPL-MCNC: 4.3 MG/DL (ref 2.7–4.5)
PLATELET # BLD AUTO: 204 K/UL (ref 150–450)
PMV BLD AUTO: 12.6 FL (ref 9.2–12.9)
POTASSIUM SERPL-SCNC: 4.3 MMOL/L (ref 3.5–5.1)
PROT UR QL STRIP: NEGATIVE
RBC # BLD AUTO: 3.21 M/UL (ref 4–5.4)
RBC #/AREA URNS AUTO: 2 /HPF (ref 0–4)
SARS-COV-2 RNA RESP QL NAA+PROBE: NOT DETECTED
SARS-COV-2- CYCLE NUMBER: NORMAL
SODIUM SERPL-SCNC: 132 MMOL/L (ref 136–145)
SP GR UR STRIP: 1.01 (ref 1–1.03)
SQUAMOUS #/AREA URNS AUTO: 4 /HPF
URN SPEC COLLECT METH UR: ABNORMAL
WBC # BLD AUTO: 13.38 K/UL (ref 3.9–12.7)
WBC #/AREA URNS AUTO: 12 /HPF (ref 0–5)

## 2022-02-24 PROCEDURE — 97530 THERAPEUTIC ACTIVITIES: CPT | Mod: CO

## 2022-02-24 PROCEDURE — 85025 COMPLETE CBC W/AUTO DIFF WBC: CPT | Performed by: HOSPITALIST

## 2022-02-24 PROCEDURE — 94761 N-INVAS EAR/PLS OXIMETRY MLT: CPT

## 2022-02-24 PROCEDURE — 83735 ASSAY OF MAGNESIUM: CPT | Performed by: HOSPITALIST

## 2022-02-24 PROCEDURE — 87077 CULTURE AEROBIC IDENTIFY: CPT | Performed by: NURSE PRACTITIONER

## 2022-02-24 PROCEDURE — 97110 THERAPEUTIC EXERCISES: CPT | Mod: CO

## 2022-02-24 PROCEDURE — 25000003 PHARM REV CODE 250: Performed by: HOSPITALIST

## 2022-02-24 PROCEDURE — 99900035 HC TECH TIME PER 15 MIN (STAT)

## 2022-02-24 PROCEDURE — 27000221 HC OXYGEN, UP TO 24 HOURS

## 2022-02-24 PROCEDURE — 25000003 PHARM REV CODE 250: Performed by: NURSE PRACTITIONER

## 2022-02-24 PROCEDURE — 87086 URINE CULTURE/COLONY COUNT: CPT | Performed by: NURSE PRACTITIONER

## 2022-02-24 PROCEDURE — 97110 THERAPEUTIC EXERCISES: CPT | Mod: CQ

## 2022-02-24 PROCEDURE — 80048 BASIC METABOLIC PNL TOTAL CA: CPT | Performed by: HOSPITALIST

## 2022-02-24 PROCEDURE — 81001 URINALYSIS AUTO W/SCOPE: CPT | Performed by: NURSE PRACTITIONER

## 2022-02-24 PROCEDURE — 11000004 HC SNF PRIVATE

## 2022-02-24 PROCEDURE — 36415 COLL VENOUS BLD VENIPUNCTURE: CPT | Performed by: HOSPITALIST

## 2022-02-24 PROCEDURE — 87186 SC STD MICRODIL/AGAR DIL: CPT | Performed by: NURSE PRACTITIONER

## 2022-02-24 PROCEDURE — 25000242 PHARM REV CODE 250 ALT 637 W/ HCPCS: Performed by: NURSE PRACTITIONER

## 2022-02-24 PROCEDURE — 84100 ASSAY OF PHOSPHORUS: CPT | Performed by: HOSPITALIST

## 2022-02-24 PROCEDURE — 87088 URINE BACTERIA CULTURE: CPT | Performed by: NURSE PRACTITIONER

## 2022-02-24 PROCEDURE — U0003 INFECTIOUS AGENT DETECTION BY NUCLEIC ACID (DNA OR RNA); SEVERE ACUTE RESPIRATORY SYNDROME CORONAVIRUS 2 (SARS-COV-2) (CORONAVIRUS DISEASE [COVID-19]), AMPLIFIED PROBE TECHNIQUE, MAKING USE OF HIGH THROUGHPUT TECHNOLOGIES AS DESCRIBED BY CMS-2020-01-R: HCPCS | Performed by: EMERGENCY MEDICINE

## 2022-02-24 RX ORDER — LOPERAMIDE HYDROCHLORIDE 2 MG/1
2 CAPSULE ORAL 4 TIMES DAILY PRN
Status: DISCONTINUED | OUTPATIENT
Start: 2022-02-24 | End: 2022-03-16 | Stop reason: HOSPADM

## 2022-02-24 RX ORDER — CIPROFLOXACIN 500 MG/1
500 TABLET ORAL EVERY 12 HOURS
Status: DISCONTINUED | OUTPATIENT
Start: 2022-02-24 | End: 2022-02-24

## 2022-02-24 RX ORDER — LANOLIN ALCOHOL/MO/W.PET/CERES
400 CREAM (GRAM) TOPICAL 2 TIMES DAILY
Status: COMPLETED | OUTPATIENT
Start: 2022-02-24 | End: 2022-02-25

## 2022-02-24 RX ORDER — AMOXICILLIN 250 MG
1 CAPSULE ORAL 2 TIMES DAILY PRN
Status: DISCONTINUED | OUTPATIENT
Start: 2022-02-24 | End: 2022-03-16 | Stop reason: HOSPADM

## 2022-02-24 RX ORDER — CEPHALEXIN 500 MG/1
500 CAPSULE ORAL EVERY 12 HOURS
Status: COMPLETED | OUTPATIENT
Start: 2022-02-24 | End: 2022-03-02

## 2022-02-24 RX ADMIN — LOPERAMIDE HYDROCHLORIDE 2 MG: 2 CAPSULE ORAL at 11:02

## 2022-02-24 RX ADMIN — Medication 100 MG: at 09:02

## 2022-02-24 RX ADMIN — FLUTICASONE FUROATE AND VILANTEROL TRIFENATATE 1 PUFF: 100; 25 POWDER RESPIRATORY (INHALATION) at 09:02

## 2022-02-24 RX ADMIN — TRIAMCINOLONE ACETONIDE: 1 CREAM TOPICAL at 09:02

## 2022-02-24 RX ADMIN — PANTOPRAZOLE SODIUM 40 MG: 40 TABLET, DELAYED RELEASE ORAL at 09:02

## 2022-02-24 RX ADMIN — ASPIRIN 81 MG CHEWABLE TABLET 81 MG: 81 TABLET CHEWABLE at 09:02

## 2022-02-24 RX ADMIN — Medication 400 MG: at 10:02

## 2022-02-24 RX ADMIN — METOPROLOL SUCCINATE 50 MG: 50 TABLET, EXTENDED RELEASE ORAL at 09:02

## 2022-02-24 RX ADMIN — TIOTROPIUM BROMIDE INHALATION SPRAY 2 PUFF: 3.12 SPRAY, METERED RESPIRATORY (INHALATION) at 10:02

## 2022-02-24 RX ADMIN — Medication 1 CAPSULE: at 09:02

## 2022-02-24 RX ADMIN — SENNOSIDES AND DOCUSATE SODIUM 1 TABLET: 50; 8.6 TABLET ORAL at 10:02

## 2022-02-24 RX ADMIN — Medication 400 MG: at 09:02

## 2022-02-24 RX ADMIN — CEPHALEXIN 500 MG: 500 CAPSULE ORAL at 11:02

## 2022-02-24 RX ADMIN — PSYLLIUM HUSK 1 PACKET: 3.4 POWDER ORAL at 05:02

## 2022-02-24 RX ADMIN — Medication 6 MG: at 09:02

## 2022-02-24 RX ADMIN — TICAGRELOR 90 MG: 90 TABLET ORAL at 09:02

## 2022-02-24 RX ADMIN — CEPHALEXIN 500 MG: 500 CAPSULE ORAL at 09:02

## 2022-02-24 RX ADMIN — HYDROCODONE BITARTRATE AND ACETAMINOPHEN 1 TABLET: 5; 325 TABLET ORAL at 09:02

## 2022-02-24 RX ADMIN — TRIAMCINOLONE ACETONIDE: 1 CREAM TOPICAL at 10:02

## 2022-02-24 RX ADMIN — ATORVASTATIN CALCIUM 80 MG: 20 TABLET, FILM COATED ORAL at 09:02

## 2022-02-24 RX ADMIN — LISINOPRIL 10 MG: 10 TABLET ORAL at 09:02

## 2022-02-24 RX ADMIN — AMLODIPINE BESYLATE 10 MG: 10 TABLET ORAL at 10:02

## 2022-02-24 RX ADMIN — FUROSEMIDE 40 MG: 40 TABLET ORAL at 09:02

## 2022-02-24 NOTE — CLINICAL REVIEW
Clinical Pharmacy Chart Review Note      Admit Date: 2/21/2022   LOS: 3 days       Marisol Kerr is a 76 y.o. female admitted to SNF for PT/OT after hospitalization for NSTEMI (non-ST elevated myocardial infarction).    Active Hospital Problems    Diagnosis  POA    *NSTEMI (non-ST elevated myocardial infarction) [I21.4]  Yes    Rectus sheath hematoma [S30.1XXA]  Yes    Acute on chronic respiratory failure [J96.20]  Yes    Acute on chronic diastolic congestive heart failure [I50.33]  Yes    Chronic hypoxemic respiratory failure [J96.11]  Yes     3 L at home      Chronic respiratory failure [J96.10]  Yes    CKD (chronic kidney disease), stage III [N18.30]  Yes    Diabetic peripheral vascular disease [E11.51]  Yes    Coronary artery disease, multivessel with history of previous PCI [I25.10]  Yes    DM type 2, controlled, with complication [E11.8]  Yes      Resolved Hospital Problems   No resolved problems to display.     Review of patient's allergies indicates:   Allergen Reactions    Iodinated contrast media     Strawberries [strawberry] Hives and Itching     Patient Active Problem List    Diagnosis Date Noted    Rectus sheath hematoma 02/14/2022    NSTEMI (non-ST elevated myocardial infarction) 02/11/2022    Acute on chronic respiratory failure 02/10/2022    Hypertensive urgency 02/10/2022    Venous stasis 02/10/2022    Hard of hearing 02/10/2022    Bradycardia 10/07/2021    Acute colitis 09/26/2019    Primary insomnia 09/26/2019    Vitamin D deficiency 09/26/2019    Hypophosphatemia 09/19/2019    Acute on chronic diarrhea 09/18/2019    Decubitus ulcer of sacral region, stage 1 09/18/2019    Bilateral nonobstructing nephrolithiasis 09/18/2019    Acute on chronic diastolic congestive heart failure 09/17/2019    COPD/emphysema 01/16/2019    Chronic hypoxemic respiratory failure 01/16/2019    Acute otitis externa of left ear 01/16/2019    Primary osteoarthritis of both hips 09/21/2018     Chronic respiratory failure 04/25/2018    Adrenal adenoma 01/27/2017    Lung nodule 10/27/2016    History of colon cancer 10/27/2016    Pure hypercholesterolemia 10/27/2016    Fall 09/29/2016    Class 2 obesity in adult 05/18/2015    CKD (chronic kidney disease), stage III 10/08/2014    Osteoporosis, post-menopausal 10/08/2014    Diabetic peripheral neuropathy 06/26/2014    Diabetic peripheral vascular disease 06/26/2014    Edema 06/03/2013    DM type 2, controlled, with complication 06/03/2013    Empyema lung 06/03/2013    Hypoxia 06/03/2013    Hypertension 06/03/2013    Coronary artery disease, multivessel with history of previous PCI 06/03/2013    Gastroesophageal reflux disease without esophagitis 06/03/2013    Hypercholesterolemia 04/09/2013    Essential hypertension, benign 04/09/2013    Depressive disorder, not elsewhere classified 04/09/2013    Coronary atherosclerosis 04/09/2013    Persistent disorder of initiating or maintaining sleep 04/09/2013       Scheduled Meds:    amLODIPine  10 mg Oral Daily    aspirin  81 mg Oral Daily    atorvastatin  80 mg Oral QHS    cephALEXin  500 mg Oral Q12H    coenzyme Q10  100 mg Oral Daily    ergocalciferol  50,000 Units Oral Q7 Days    fluticasone furoate-vilanteroL  1 puff Inhalation Daily    magnesium oxide  400 mg Oral BID    metoprolol succinate  50 mg Oral Daily    omega-3 fatty acids-fish oil  1 capsule Oral Daily    pantoprazole  40 mg Oral Daily    senna-docusate 8.6-50 mg  1 tablet Oral BID    ticagrelor  90 mg Oral BID    tiotropium bromide  2 puff Inhalation Daily    triamcinolone acetonide 0.1%   Topical (Top) BID     Continuous Infusions:   PRN Meds: acetaminophen, albuterol-ipratropium, aluminum & magnesium hydroxide-simethicone, bromfenac, calcium carbonate, HYDROcodone-acetaminophen, melatonin, nitroGLYCERIN, ondansetron    OBJECTIVE:     Vital Signs (Last 24H)  Temp:  [97.2 °F (36.2 °C)]   Pulse:  [60]   Resp:  [18]    BP: (111)/(56)   SpO2:  [94 %-98 %]     Laboratory:  CBC:   Recent Labs   Lab 02/21/22  0456 02/21/22  1606 02/24/22  0500   WBC 14.28* 13.94* 13.38*   RBC 3.67* 3.88* 3.21*   HGB 10.5* 11.2* 9.0*   HCT 33.7* 35.2* 28.9*    217 204   MCV 92 91 90   MCH 28.6 28.9 28.0   MCHC 31.2* 31.8* 31.1*     BMP:   Recent Labs   Lab 02/20/22  0357 02/21/22  0456 02/24/22  0500   * 105 84    136 132*   K 4.0 3.1* 4.3   CL 93* 90* 92*   CO2 36* 31* 28   BUN 37* 51* 94*   CREATININE 1.4 1.6* 3.1*   CALCIUM 9.2 8.7 8.6*   MG 1.6 1.4* 1.4*     CMP:   Recent Labs   Lab 02/19/22  0424 02/20/22  0357 02/21/22  0456 02/24/22  0500   * 129* 105 84   CALCIUM 8.8 9.2 8.7 8.6*   ALBUMIN 2.9* 2.9* 2.7*  --    PROT 5.8* 6.2 5.9*  --     142 136 132*   K 3.8 4.0 3.1* 4.3   CO2 32* 36* 31* 28    93* 90* 92*   BUN 34* 37* 51* 94*   CREATININE 1.3 1.4 1.6* 3.1*   ALKPHOS 74 72 64  --    ALT 22 20 18  --    AST 23 19 20  --    BILITOT 2.3* 2.7* 2.5*  --      LFTs:   Recent Labs   Lab 02/19/22 0424 02/20/22  0357 02/21/22  0456   ALT 22 20 18   AST 23 19 20   ALKPHOS 74 72 64   BILITOT 2.3* 2.7* 2.5*   PROT 5.8* 6.2 5.9*   ALBUMIN 2.9* 2.9* 2.7*     Lab Results   Component Value Date    HGBA1C 5.2 01/27/2022         ASSESSMENT/PLAN:     I have reviewed the medications in compliance with CMS Regulation F756 of the [place]. Based on information gathered, the following items may need to be addressed:    **According to PMH and home medication list, patient takes the following medications at home. These medications are not currently ordered at SNF:  · Advair (non-formulary, currently taking Breo)  · Benazepril (non-formulary, was taking lisinopril, now on hold for ISSA)  · Furosemide (currently on hold for ISSA)  · Lovastatin (non-formulary, currently taking atorvastatin)  · NTG patch  · Temazepam (non-formulary, currently taking melatonin)  · Umclindinium (non-formulary, currently taking tiotropium)      Medications  reviewed by PharmD, please re-consult if needed.      Ronda Steve, Pharm. D.  Clinical Pharmacist  [facility]

## 2022-02-24 NOTE — PT/OT/SLP PROGRESS
"Physical Therapy Treatment    Patient Name:  Marisol Kerr   MRN:  [id number]  Admit Date: 2/21/2022  Admitting Diagnosis: NSTEMI (non-ST elevated myocardial infarction)  Recent Surgeries:     General Precautions: Standard, fall, hearing impaired   Orthopedic Precautions:N/A   Braces:       Recommendations:     Discharge Recommendations:  home health PT   Level of Assistance Recommended at Discharge: Intermittent assistance   Discharge Equipment Recommendations: none   Barriers to discharge: Decreased caregiver support    Assessment:     Marisol Kerr is a 76 y.o. female admitted with a medical diagnosis of NSTEMI (non-ST elevated myocardial infarction) . Pt with limited session 2* to reports of diarrhea all night and nausea today, nsg aware, pt would continue to benefit from skilled PT services to improve overall functional mobility, strength and endurance.  .      Performance deficits affecting function:  weakness, impaired endurance, impaired self care skills, impaired functional mobilty, gait instability, impaired balance, decreased lower extremity function, impaired cardiopulmonary response to activity .    Rehab Potential is good    Activity Tolerance: Fair    Plan:     Patient to be seen 6 x/week to address the above listed problems via gait training, therapeutic activities, therapeutic exercises, neuromuscular re-education, wheelchair management/training    · Plan of Care Expires: 03/08/22  · Plan of Care Reviewed with: patient    Subjective     "not too good, up all night 2* to diarrhea and nausea today" nsg aware agreeable to seated therex.     Pain/Comfort:  · Pain Rating 1: 0/10  · Pain Rating Post-Intervention 1: 0/10    Patient's cultural, spiritual, Buddhist conflicts given the current situation:  · no    Objective:       Patient found  with oxygen upon PT entry to room.     Therapeutic Activities and Exercises: 2x10 reps AP,LAQ,hip flex,abd/add mini elliptical x 10 min    AM-PAC 6 CLICK " MOBILITY  16    Patient left up in chair with all lines intact, call button in reach and belongings in reach.    GOALS:   Multidisciplinary Problems     Physical Therapy Goals        Problem: Physical Therapy Goal    Goal Priority Disciplines Outcome Goal Variances Interventions   Physical Therapy Goal     PT, PT/OT Ongoing, Progressing     Description: Goals to be met by: 3/8/22    Patient will increase functional independence with mobility by performing:    . Supine to sit with [place]  . Sit to supine with [place]  . Rolling to Left and Right with [place].  . Sit to stand transfer with Supervision  . Bed to chair transfer with Supervision using Rolling Walker  . Gait  x 100 feet with Supervision using Rolling Walker.   . Wheelchair propulsion x50 feet with Supervision using bilateral uppper extremities  . Ascend/Descend 4 inch curb step with Contact Guard Assistance using Rolling Walker.  . Retrieve object off floor with RW and reacher and SBA.                     Time Tracking:     PT Received On: 02/24/22  PT Total Time (min):       Billable Minutes: Therapeutic Exercise 26    Treatment Type: Treatment  PT/PTA: PTA     PTA Visit Number: 2     02/24/2022

## 2022-02-24 NOTE — PROGRESS NOTES
[facility]                                  Skilled Nursing Facility                   Progress Note     Admit Date: 2/21/2022  LUZ  TBD  Principal Problem:  NSTEMI (non-ST elevated myocardial infarction)   HPI obtained from patient interview and chart review     Chief Complaint: Re-evaluation of medical treatment and therapy status: Lab review, UTI, ISSA    HPI:   Ms Kerr is a 76 year old female with PMHx of DM2, HTN, HLD, CKD, COPD on home O2 who presents SNF following hospitalization for NSTEMI.  Admission to SNF for secondary to weakness and debility.    Interval history: All of today's labs reviewed and are listed below.  BUN/creatinine noted to be 94/3.1 from 51/1.6, Mg 1.4.  24 hr vital sign ranges listed below.  UA with 12 WBCs, 1+ leukocytes.  Will treat with empiric antibiotics, UTI possibly causing ISSA.  Repeating BMP tomorrow.  Patient does not appear ill.  Patient denies shortness of breath, abdominal discomfort, nausea, or vomiting.  Patient reports an adequate appetite.  Patient denies dysuria.  Patient reports having regular bowel movements.  Patient progessing with PT/OT. Continuing to follow and treat all acute and chronic conditions.    Past Medical History: Patient has a past medical history of CAD (coronary artery disease), Cancer, CHF (congestive heart failure), Colitis, Colon cancer, COPD (chronic obstructive pulmonary disease), Decreased hearing, Diabetes mellitus, Diabetes mellitus, type 2, Hypercholesterolemia, Hypertension, Hypoxemia, Insomnia, Obesity, and Osteoarthritis.    Past Surgical History: Patient has a past surgical history that includes External ear surgery; Colectomy; Cholecystectomy; Flexible sigmoidoscopy (N/A, 9/19/2019); Eye surgery; Hysterectomy; Coronary stent placement; Cardiac catheterization; Coronary angioplasty; and ANGIOGRAM, CORONARY, WITH LEFT HEART CATHETERIZATION (N/A,  2/10/2022).    Social History: Patient reports that she quit smoking about 16 years ago. Her smoking use included cigarettes. She started smoking about 57 years ago. She has a 150.00 pack-year smoking history. She has never used smokeless tobacco. She reports current alcohol use. She reports that she does not use drugs.    Family History: family history includes Febrile seizures in her mother; Heart failure in her father.    Allergies: Patient is allergic to iodinated contrast media and strawberries [strawberry].    ROS  Constitutional: Negative for fever   Eyes: Negative for blurred vision, double vision   Respiratory: Negative for cough, shortness of breath   Cardiovascular: Negative for chest pain, palpitations, and leg swelling.   Gastrointestinal: Negative for abdominal pain, constipation, diarrhea, nausea, vomiting.   Genitourinary:  + for dysuria, frequency   Musculoskeletal:  + generalized weakness. Negative for back pain and myalgias.   Skin: Negative for itching and rash.   Neurological: Negative for dizziness, headaches.   Psychiatric/Behavioral: Negative for depression. The patient is not nervous/anxious.      24 hour Vital Sign Range   Temp:  [97.2 °F (36.2 °C)]   Pulse:  [60-79]   Resp:  [18]   BP: (111-132)/(56-60)   SpO2:  [94 %-98 %]     PEx  Constitutional: Patient appears debilitated.  No distress noted  HENT:   Head: Normocephalic and atraumatic.   Eyes: Pupils are equal, round  Neck: Normal range of motion. Neck supple.   Cardiovascular: Normal rate, regular rhythm and normal heart sounds.    Pulmonary/Chest: Effort normal and breath sounds are clear with diminished bases.  Supplemental oxygen in progress  Abdominal: Soft. Bowel sounds are normal.   Musculoskeletal: Normal range of motion.   Neurological: Alert and oriented to person, place, and time.   Psychiatric: Normal mood and affect. Behavior is normal.   Skin: Skin is warm and dry.  PVD changes to bilateral lower extremities    Recent  Labs   Lab 02/24/22  0500   *   K 4.3   CL 92*   CO2 28   BUN 94*   CREATININE 3.1*   MG 1.4*       Recent Labs   Lab 02/24/22  0500   WBC 13.38*   RBC 3.21*   HGB 9.0*   HCT 28.9*      MCV 90   MCH 28.0   MCHC 31.1*         Assessment and Plan:    UTI  - initiated.  Keflex 500 mg BID x7 days, will tailor antibiotic once culture and sensitivity has resulted    ISSA- new  CKD (chronic kidney disease), stage III  - Baseline sCr 1.5- 1.8   - likely caused from UTI, discontinuing lisinopril at [facility], repeat BMP tomorrow continue to monitor twice weekly BMPs, avoid nephrotoxic agents, renally dose medications when appropriate    NSTEMI (non-ST elevated myocardial infarction)  Coronary artery disease,  multivessel with history of previous PCI  - Recent hx of increasing episodes of angina requiring more frequent Nitroglycerin use and underwent LHC at OSH.   -Recent Left heart cath[02/11/22] showed:  · The RPAV lesion was 100% stenosed.  · The RPDA lesion was 100% stenosed.  · The Prox Cx to Mid Cx lesion was 80% stenosed.  · The 1st LPL lesion was 90% stenosed.  · The Prox RCA-2 lesion was 70% stenosed.  · The Prox RCA-1 lesion was 90% stenosed.  · The Mid LAD-2 lesion was 60% stenosed.  - continue 81 mg ASA qd, Atorvastatin 80mg qhs, Metoprolol 50 mg XL, Ticagrelor 90mg bid  - LHC deferred given rectus sheath hematoma. Plan for IC intervention 1-2 weeks after admission for outpatient.  Coronary atherosclerosis    Acute on chronic diastolic congestive heart failure  - TTE notable for Grade II left ventricular diastolic dysfunction, EF 65%  - continue Lasix 40 mg BID, lisinopril 10 mg daily  - Cardiac diet with Fluid restriction at 1.5L with strict I/Os and daily STANDING weights    Essential hypertension, benign  - home regimen with amlodipine 5 mg daily, benazepril 40 mg daily  - continue amlodipine 10 mg daily, lisinopril 10 mg daily, metoprolol XL 50 mg daily    COPD/emphysema  - Known history of severe COPD  (GOLD IV D PFT 2020). On home oxygen (2-3L)  - Home medications: albuterol (VENTOLIN HFA) 90 mcg/actuation inhaler, ipratropium-albuteroL (COMBIVENT RESPIMAT)  mcg/actuation inhaler,  umeclidinium (INCRUSE ELLIPTA) 62.5 mcg/actuation inhalation capsule, fluticasone-salmeterol diskus inhaler 250-50 mcg  - continue Breo and Spiriva inhaler   - initiated DuoNebs PRN    DM type 2, controlled, with complication  - last A1c 5.2 on 01/27/2022  - diabetic diet, Accu-Cheks AC/HS  - continue LDSSI PRN     Dysuria  - UA with reflex ordered    Gastroesophageal reflux disease without esophagitis  - continue Protonix 40 mg daily    Hypercholesterolemia  - continue Atorvastatin 40mg    Obesity  - BMI currently 33.94  - RD following, weight loss encouraged    Rectus sheath hematoma  - Patient had severe abdominal pain the day before with a mass on the abdomen. Patient was unable to have a BM yesterday. KUB was drawn and showed a large stool burden. Continued bowel regimen but pain was persistent. CT abdomen without contrast and lactic drawn. CT abdomen showed rectal sheath hematoma extending to the pelvis. IR consulted and recommended CTA of the abdomen to identify bleeding vessel. Due to kidney function, held off on CTA for concerns of contrast induced nephropathy as patient had a Alfredo score of 16 with 100cc of contrast, 15 with 75 cc contrast. IR consulted and following. Hgb went from 13 on admission --> 10.6 --> 9.9 --> 8.9. hematoma was expanding. IR performed embolization of inferior epigastric and collateral vessels.   - continue to monitor abdominal mass     Venous stasis  - Chronic history of venous stasis of bilateral lower extremities limited to breakdown of skin without varicose veins  - Patient denies increase in leg swelling over her baseline  - wound care RN following    Vitamin-D deficiency  - continue ergocalciferol 34657 units weekly    Debility   - Continue with PT/OT for gait training and strengthening  and restoration of ADL's   - Encourage mobility, OOB in chair, and early ambulation as appropriate  - Fall precautions   - Monitor for bowel and bladder dysfunction  - Monitor for and prevent skin breakdown and pressure ulcers  - Continue DVT prophylaxis with ASA/ticagrelor, frequent ambulation         Anticipate disposition:  Home with home health      Follow-up needed during SNF stay- interventional cardiology (3/17)    Follow-up needed after discharge from SNF: PCP,     Future Appointments   Date Time Provider Department Center   3/17/2022 10:40 AM Scott Chappell MD [facility]   4/27/2022 11:20 AM Antonieta Marquez NP [facility]       Natalie Clemons NP  Department of Hospital Medicine   [facility] Sk[facility]     DOS: 2/24/2022       Patient note was created using MModal Dictation.  Any errors in syntax or even information may not have been identified and edited on initial review prior to signing this note.

## 2022-02-24 NOTE — PROGRESS NOTES
[facility] - Skilled Nursing  Wound Care    Patient Name:  Marisol Kerr   MRN:  [id number]  Date: 2022  Diagnosis: NSTEMI (non-ST elevated myocardial infarction)    History:     Past Medical History:   Diagnosis Date    CAD (coronary artery disease)     Cancer     colon    CHF (congestive heart failure)     Colitis     Colon cancer     COPD (chronic obstructive pulmonary disease)     Decreased hearing     Diabetes mellitus     Diabetes mellitus, type 2     Hypercholesterolemia     Hypertension     Hypoxemia     Insomnia     Obesity     Osteoarthritis        Social History     Socioeconomic History    Marital status:    Tobacco Use    Smoking status: Former Smoker     Packs/day: 3.00     Years: 50.00     Pack years: 150.00     Types: Cigarettes     Start date: 3/30/1964     Quit date: 2006     Years since quittin.0    Smokeless tobacco: Never Used    Tobacco comment: ex smoker 15 years   Substance and Sexual Activity    Alcohol use: Yes    Drug use: No    Sexual activity: Never       Precautions:     Allergies as of 2022 - Reviewed 2022   Allergen Reaction Noted    Iodinated contrast media  06/15/2015    Strawberries [strawberry] Hives and Itching 2016       [facility] Assessment Details/Treatment   Wound care consulted for venous stasis ulcers BLE by NP  The BLE skin is intact, red/dry.  She complains of burning sensation to lower legs/feet. No edema.   Sween 24 lotion applied to BLE with relief voiced by patient.    Plan:  Continue Triamcinolinel ointment to BLE as ordered  Apply Sween 24 lotion daily after bath.     Nursing to continue care, pressure prevention measures  Wound care will follow- up prn as needed.     Recommendations made to primary team per written report for above plan . Orders placed.   BLE        2022

## 2022-02-24 NOTE — PLAN OF CARE
Interdisciplinary team, Radha Rodriguez, RN Charge Nurse, García Marina, Unit Director, Alisa Nuñez RN MDS Coordinator, Dominga Akbar OT, Rehab Supervisor, Marisol Elaine [facility], and Marisol Davis, Dietician, spoke to patient for care plan conference, weekly status update, and therapy progress update. Tentative discharge date set for 3/7/22.

## 2022-02-24 NOTE — PT/OT/SLP PROGRESS
"Occupational Therapy   Treatment    Name: Marisol Kerr  MRN: [id number]  Admit Date: 2/21/2022  Admitting Diagnosis:  NSTEMI (non-ST elevated myocardial infarction)    General Precautions: Standard, fall   Orthopedic Precautions:N/A   Braces:       Recommendations:     Discharge Recommendations: home health OT  Level of Assistance Recommended at Discharge: Intermittent assistance for ADL's and homemaking tasks  Discharge Equipment Recommendations:  none (Pt has BSC, shower chair, grab bars, a RW, and a transport chair.)  Barriers to discharge:  Decreased caregiver support    Assessment:     Marisol Kerr is a 76 y.o. female with a medical diagnosis of NSTEMI (non-ST elevated myocardial infarction) . Performance deficits affecting function are weakness, impaired endurance, impaired self care skills, impaired functional mobilty, gait instability, impaired balance, decreased coordination, decreased lower extremity function, decreased ROM, impaired skin, impaired cardiopulmonary response to activity. Pt. participated well with session on this day. Pt is progressing well with session on this day still continues to requires cues with aspects of safety . Pt. Will continue to benefit from continued OT to progress towards goals     Rehab Potential is good    Activity tolerance:  Good    Plan:     Patient to be seen 6 x/week to address the above listed problems via self-care/home management, therapeutic activities, therapeutic exercises    · Plan of Care Expires: 03/22/22  · Plan of Care Reviewed with: patient    Subjective     Communicated with: latasha prior to session. "My daughter is about to come here"    Pain/Comfort:  Pain Rating 1: 0/10  Pain Rating Post-Intervention 1: 0/10    Patient's cultural, spiritual, Jewish conflicts given the current situation:  no ([place])    Objective:     Patient found up in chair with oxygen upon OT entry to room.      Functional Mobility/Transfers:  · Patient completed Sit <> Stand Transfer " with stand by assistance  with  rolling walker   · Patient completed Toilet Transfer Step Transfer technique with contact guard assistance with  rolling walker  · Functional Mobility: Pt. With fxl mobility throughout room and bathroom with RW and CGA. No LOB noted    Activities of Daily Living:  · Lower Body Dressing: contact guard assistance to doff/beth pants over hips instance   · Toileting: minimum assistance with diaper management instance    AMPA 6 Click ADL: 19    OT Exercises: UE Ergometer 10 mins with moderate resistance    Treatment & Education:  Pt. With standing act on this day with task. Pt. With CGA/SBA for balance aspects with task with AD at raised counter Pt with visual perception task with discrimination of various shapes and sizes x 8 min and 25 secs with standing bal and min cues throught out. Pt. able to complete finish task but seated rest break required.    Pt. With 2# dowel activity with 2x20 reps with bicep curls horz adb/add and forward flex motion to increase BUE ROM and strength,  .   Pt. With standing and therex performed to increase ROM, endurance selfcare task and fxl mobility for independence     Patient left up in chair with all lines intact, call button in reach and daughter presentEducation:      GOALS:   Multidisciplinary Problems     Occupational Therapy Goals        Problem: Occupational Therapy Goal    Goal Priority Disciplines Outcome Interventions   Occupational Therapy Goal     OT, PT/OT Ongoing, Progressing    Description: Goals to be met by: 3/8/22     Patient will increase functional independence with ADLs by performing:    UE Dressing with Modified [place].  LE Dressing with Supervision. Using hip kit to perform LBD.  Grooming while seated at sink with Modified [place].  Toileting from toilet or BSC with Supervision for hygiene and clothing management.   Bathing from  sitting at sink with Minimal Assistance with (A) to wash distal LEs  Supine to sit with  Modified [place].  Stand pivot transfers with Supervision with RW to steady..  Upper extremity exercise with UBE  for 10 minutes to increase functional endurance with supervision.  Caregiver will be educated on level of assist required to safely perform self care tasks and functional transfers..                      Time Tracking:     OT Date of Treatment: OT Date of Treatment: 02/24/22  OT Total Time (min): Total Time (min): 42 min    Billable Minutes:Therapeutic Activity 10  Therapeutic Exercise 32    2/24/2022   OT and TYLER have discussed the above patients goals and status in collaboration with Plan of Care.

## 2022-02-24 NOTE — PLAN OF CARE
Problem: Adult Inpatient Plan of Care  Goal: Plan of Care Review  Outcome: Ongoing, Progressing  Goal: Patient-Specific Goal (Individualized)  Outcome: Ongoing, Progressing     Problem: Diabetes Comorbidity  Goal: Blood Glucose Level Within Targeted Range  Outcome: Ongoing, Progressing     Problem: Fall Injury Risk  Goal: Absence of Fall and Fall-Related Injury  Outcome: Ongoing, Progressing     Problem: Skin Injury Risk Increased  Goal: Skin Health and Integrity  Outcome: Ongoing, Progressing     Problem: Impaired Wound Healing  Goal: Optimal Wound Healing  Outcome: Ongoing, Progressing

## 2022-02-25 LAB
ANION GAP SERPL CALC-SCNC: 14 MMOL/L (ref 8–16)
BUN SERPL-MCNC: 95 MG/DL (ref 8–23)
CALCIUM SERPL-MCNC: 8.2 MG/DL (ref 8.7–10.5)
CHLORIDE SERPL-SCNC: 93 MMOL/L (ref 95–110)
CO2 SERPL-SCNC: 25 MMOL/L (ref 23–29)
CREAT SERPL-MCNC: 2.6 MG/DL (ref 0.5–1.4)
EST. GFR  (AFRICAN AMERICAN): 20.2 ML/MIN/1.73 M^2
EST. GFR  (NON AFRICAN AMERICAN): 17.5 ML/MIN/1.73 M^2
GLUCOSE SERPL-MCNC: 94 MG/DL (ref 70–110)
POTASSIUM SERPL-SCNC: 4.1 MMOL/L (ref 3.5–5.1)
SODIUM SERPL-SCNC: 132 MMOL/L (ref 136–145)

## 2022-02-25 PROCEDURE — 80048 BASIC METABOLIC PNL TOTAL CA: CPT | Performed by: NURSE PRACTITIONER

## 2022-02-25 PROCEDURE — 25000242 PHARM REV CODE 250 ALT 637 W/ HCPCS: Performed by: NURSE PRACTITIONER

## 2022-02-25 PROCEDURE — 25000003 PHARM REV CODE 250: Performed by: HOSPITALIST

## 2022-02-25 PROCEDURE — 97110 THERAPEUTIC EXERCISES: CPT

## 2022-02-25 PROCEDURE — 97530 THERAPEUTIC ACTIVITIES: CPT

## 2022-02-25 PROCEDURE — 94761 N-INVAS EAR/PLS OXIMETRY MLT: CPT

## 2022-02-25 PROCEDURE — 25000003 PHARM REV CODE 250: Performed by: NURSE PRACTITIONER

## 2022-02-25 PROCEDURE — 27000221 HC OXYGEN, UP TO 24 HOURS

## 2022-02-25 PROCEDURE — 99306 PR NURSING FACILITY CARE, INIT, HIGH SEVERITY: ICD-10-PCS | Mod: AI,,, | Performed by: HOSPITALIST

## 2022-02-25 PROCEDURE — 36415 COLL VENOUS BLD VENIPUNCTURE: CPT | Performed by: NURSE PRACTITIONER

## 2022-02-25 PROCEDURE — 97116 GAIT TRAINING THERAPY: CPT | Mod: CQ

## 2022-02-25 PROCEDURE — 11000004 HC SNF PRIVATE

## 2022-02-25 PROCEDURE — 97110 THERAPEUTIC EXERCISES: CPT | Mod: CQ

## 2022-02-25 PROCEDURE — 99306 1ST NF CARE HIGH MDM 50: CPT | Mod: AI,,, | Performed by: HOSPITALIST

## 2022-02-25 PROCEDURE — 97530 THERAPEUTIC ACTIVITIES: CPT | Mod: CQ

## 2022-02-25 RX ORDER — CHOLESTYRAMINE 4 G/9G
1 POWDER, FOR SUSPENSION ORAL 2 TIMES DAILY
Status: DISCONTINUED | OUTPATIENT
Start: 2022-02-25 | End: 2022-03-16 | Stop reason: HOSPADM

## 2022-02-25 RX ADMIN — Medication 400 MG: at 12:02

## 2022-02-25 RX ADMIN — ONDANSETRON 8 MG: 8 TABLET, ORALLY DISINTEGRATING ORAL at 12:02

## 2022-02-25 RX ADMIN — AMLODIPINE BESYLATE 10 MG: 10 TABLET ORAL at 12:02

## 2022-02-25 RX ADMIN — CEPHALEXIN 500 MG: 500 CAPSULE ORAL at 08:02

## 2022-02-25 RX ADMIN — CHOLESTYRAMINE 4 G: 4 POWDER, FOR SUSPENSION ORAL at 08:02

## 2022-02-25 RX ADMIN — Medication 100 MG: at 12:02

## 2022-02-25 RX ADMIN — PANTOPRAZOLE SODIUM 40 MG: 40 TABLET, DELAYED RELEASE ORAL at 12:02

## 2022-02-25 RX ADMIN — TICAGRELOR 90 MG: 90 TABLET ORAL at 12:02

## 2022-02-25 RX ADMIN — TICAGRELOR 90 MG: 90 TABLET ORAL at 08:02

## 2022-02-25 RX ADMIN — PSYLLIUM HUSK 1 PACKET: 3.4 POWDER ORAL at 12:02

## 2022-02-25 RX ADMIN — ATORVASTATIN CALCIUM 80 MG: 20 TABLET, FILM COATED ORAL at 08:02

## 2022-02-25 RX ADMIN — FLUTICASONE FUROATE AND VILANTEROL TRIFENATATE 1 PUFF: 100; 25 POWDER RESPIRATORY (INHALATION) at 12:02

## 2022-02-25 RX ADMIN — TRIAMCINOLONE ACETONIDE: 1 CREAM TOPICAL at 09:02

## 2022-02-25 RX ADMIN — HYDROCODONE BITARTRATE AND ACETAMINOPHEN 1 TABLET: 5; 325 TABLET ORAL at 08:02

## 2022-02-25 RX ADMIN — Medication 400 MG: at 08:02

## 2022-02-25 RX ADMIN — Medication 1 CAPSULE: at 12:02

## 2022-02-25 RX ADMIN — TIOTROPIUM BROMIDE INHALATION SPRAY 2 PUFF: 3.12 SPRAY, METERED RESPIRATORY (INHALATION) at 12:02

## 2022-02-25 RX ADMIN — CEPHALEXIN 500 MG: 500 CAPSULE ORAL at 12:02

## 2022-02-25 RX ADMIN — METOPROLOL SUCCINATE 50 MG: 50 TABLET, EXTENDED RELEASE ORAL at 12:02

## 2022-02-25 RX ADMIN — ASPIRIN 81 MG CHEWABLE TABLET 81 MG: 81 TABLET CHEWABLE at 12:02

## 2022-02-25 RX ADMIN — Medication 6 MG: at 08:02

## 2022-02-25 NOTE — PT/OT/SLP PROGRESS
"Physical Therapy Treatment    Patient Name:  Marisol Kerr   MRN:  [id number]  Admit Date: 2/21/2022  Admitting Diagnosis: NSTEMI (non-ST elevated myocardial infarction)  Recent Surgeries:     General Precautions: Standard, fall, hearing impaired   Orthopedic Precautions:N/A   Braces:       Recommendations:     Discharge Recommendations:  home health PT   Level of Assistance Recommended at Discharge: Intermittent assistance   Discharge Equipment Recommendations: none   Barriers to discharge: Decreased caregiver support    Assessment:     Marisol Kerr is a 76 y.o. female admitted with a medical diagnosis of NSTEMI (non-ST elevated myocardial infarction) . Pt tolerated well, pt would continue to benefit from skilled PT services to improve overall functional mobility, strength and endurance.  .      Performance deficits affecting function:  weakness, impaired endurance, impaired self care skills, impaired functional mobilty, gait instability, impaired balance, decreased lower extremity function, impaired cardiopulmonary response to activity .    Rehab Potential is good    Activity Tolerance: Fair    Plan:     Patient to be seen 6 x/week to address the above listed problems via gait training, therapeutic activities, therapeutic exercises, neuromuscular re-education, wheelchair management/training    · Plan of Care Expires: 03/08/22  · Plan of Care Reviewed with: patient    Subjective     "ready now" .     Pain/Comfort:  · Pain Rating 1: 0/10  · Pain Rating Post-Intervention 1: 0/10    Patient's cultural, spiritual, Advent conflicts given the current situation:  · no    Objective:     .  Patient found with oxygen upon PT entry to room.     Therapeutic Activities and Exercises: 2x10 AP,GS,LAQ,hip flex, mini elliptical x 10min    Functional Mobility:  · Transfers:     · Sit to Stand:  stand by assistance and contact guard assistance with rolling walker  · Gait: amb with RW CGA/close SBA ~ 68 ft no LOB vcs for staying closer " "to RW and erect posture, declined second trial 2nd to fatigue wc/02 in tow  · Stairs: ~asc/manny 2 " curb with RW CGA vcs/demo for tech    AM-PAC 6 CLICK MOBILITY  16    Patient left up in chair with all lines intact, call button in reach and belonging sin reach.    GOALS:   Multidisciplinary Problems     Physical Therapy Goals        Problem: Physical Therapy Goal    Goal Priority Disciplines Outcome Goal Variances Interventions   Physical Therapy Goal     PT, PT/OT Ongoing, Progressing     Description: Goals to be met by: 3/8/22    Patient will increase functional independence with mobility by performing:    . Supine to sit with [place]  . Sit to supine with [place]  . Rolling to Left and Right with [place].  . Sit to stand transfer with Supervision  . Bed to chair transfer with Supervision using Rolling Walker  . Gait  x 100 feet with Supervision using Rolling Walker.   . Wheelchair propulsion x50 feet with Supervision using bilateral uppper extremities  . Ascend/Descend 4 inch curb step with Contact Guard Assistance using Rolling Walker.  . Retrieve object off floor with RW and reacher and SBA.                     Time Tracking:     PT Received On: 02/25/22  PT Total Time (min):       Billable Minutes: Gait Training 11, Therapeutic Activity 10 and Therapeutic Exercise 22    Treatment Type: Treatment  PT/PTA: PTA     PTA Visit Number: 3     02/25/2022  "

## 2022-02-25 NOTE — H&P
"Hospital Medicine  Skilled Nursing Facility   History and Physical Exam      Date of Service: 02/25/2022      Patient Name: Marisol Kerr  MRN: [id number]  Admission Date: 2/21/2022  Attending Physician: Truman Sanchez MD  Primary Care Provider: Khadar Dwyer MD  Code Status: Full Code      Principal problem:  NSTEMI (non-ST elevated myocardial infarction)      Chief Complaint:   Chief Complaint   Patient presents with    Weakness     Admitted to OS for rehabilitation following hospital stay for NSTEMI but had rectus sheath hematoma s/p embolization          HPI:   "Ms Kerr is a 76 year old female with PMHx of DM2, HTN, HLD, CKD, COPD on home O2 who presents SNF following hospitalization for NSTEMI.  Admission to SNF for secondary to weakness and debility.     Patient initially presented to [facility] for a left heart catheterization to evaluate recently increasing episodes of angina requiring more frequent Nitroglycerin use. She has history of iodine allergy and was pretreated with prednisone, but post- procedure she developed SOB, respiratory distress requiring overnight hospitalization for monitoring. She also had elevated blood pressures with systolics greater than 200s during hospitalization. Overnight, as she woke up to urinate she felt 10/10 chest pain all over that she described as "burning", and "veins being ripped apart". STAT EKG at the time showed concerns for  lateral precordial ST depressions. She was transferred to [facility] for further evaluation of her ACS and potential emergent intervention. LHC at outside hospital was notable for significant coraonary artery disease. Patient admitted to CCU while pending evaluation for ACS intervention, given her NSTEMI. Interventional Cardiology and Cardiothoracic Surgery consulted. Initiated guideline directed medical therapy for ACS.   CTS evaluation did not recommend patient for CABG due to her debility as she is prohibitively high risk for " "CABG. Patient had significant abdominal pain overnight and distended abdomen with mass. In light of this, IC deferred LHC. CT of the abdomen showed a large rectus sheath hematoma. Given patient's kidney function, CTA was deferred temporarily but the abdominal mass continued to enlarge. CTA of the abdomen showed extravasation of contrast into the rectus sheath hematoma. IR consulted and performed embolization of the inferior epigastric and collateral arteries. Patient's Cr elevated to 1.9 likely due to contrast. Patient's Hgb dropped requiring transfusion with aim >10 for IC intervention. Patient was given 2 pRBC and Hgb responded up to 9.2. IC recommended that since patient had a recent bleed, would defer procedure for outpatient in 1-2 weeks. Patient discharged to SNF and has appointment with Dr. Chappell for possible PCI on 3/17/2022.      Today, patient is without major complaints.  She denies chest pain at this time.  Currently wearing supplemental O2.  Later in the day, patient reported dysuria to nursing.  UA with reflex ordered." per Natalie Clemons NP on 2/22/22.     She is feeling well today. Denies any dysuria today. Says that she has had nausea and diarrhea since having her gallbladder was removed. She says that her breathing is at baseline and she is on her home oxygen. She does occasionally get lightheaded whenever she gets out of the bed. She is working well with therapy.     Patient admitted with skilled services with PT and OT to improve functional status and ability to perform ADLs.       Past Medical History:   Past Medical History:   Diagnosis Date    CAD (coronary artery disease)     Cancer     colon    CHF (congestive heart failure)     Colitis     Colon cancer     COPD (chronic obstructive pulmonary disease)     Decreased hearing     Diabetes mellitus     Diabetes mellitus, type 2     Hypercholesterolemia     Hypertension     Hypoxemia     Insomnia     Obesity     Osteoarthritis  "       Past Surgical History:   Past Surgical History:   Procedure Laterality Date    ANGIOGRAM, CORONARY, WITH LEFT HEART CATHETERIZATION N/A 2/10/2022    Procedure: Angiogram, Coronary, with Left Heart Cath;  Surgeon: Cristian Benjamin MD;  Location: [facility] CATH/EP LAB;  Service: Cardiology;  Laterality: N/A;    CARDIAC CATHETERIZATION      CHOLECYSTECTOMY      COLECTOMY      CORONARY ANGIOPLASTY      CORONARY STENT PLACEMENT      EXTERNAL EAR SURGERY      EYE SURGERY      FLEXIBLE SIGMOIDOSCOPY N/A 2019    Procedure: SIGMOIDOSCOPY, FLEXIBLE;  Surgeon: Biju Kramer III, MD;  Location: [facility] ENDO;  Service: Endoscopy;  Laterality: N/A;    HYSTERECTOMY      partial       Social History:   Tobacco Use    Smoking status: Former Smoker     Packs/day: 3.00     Years: 50.00     Pack years: 150.00     Types: Cigarettes     Start date: 3/30/1964     Quit date: 2006     Years since quittin.0    Smokeless tobacco: Never Used    Tobacco comment: ex smoker 15 years   Substance and Sexual Activity    Alcohol use: Yes    Drug use: No    Sexual activity: Never       Family History:   Family History     Problem Relation (Age of Onset)    Febrile seizures Mother    Heart failure Father          No current facility-administered medications on file prior to encounter.     Current Outpatient Medications on File Prior to Encounter   Medication Sig    acetaminophen (TYLENOL) 325 MG tablet Take 2 tablets (650 mg total) by mouth every 4 (four) hours as needed.    albuterol (VENTOLIN HFA) 90 mcg/actuation inhaler Inhale 2 puffs into the lungs every 6 (six) hours as needed for Wheezing or Shortness of Breath. Rescue    amLODIPine (NORVASC) 10 MG tablet Take 1 tablet (10 mg total) by mouth once daily.    aspirin (ECOTRIN) 81 MG EC tablet Take 81 mg by mouth every morning.     atorvastatin (LIPITOR) 80 MG tablet Take 1 tablet (80 mg total) by mouth every evening.    bromfenac (PROLENSA) 0.07 % Drop  Place 1 drop into both eyes daily as needed.    coenzyme Q10 100 mg capsule Take 100 mg by mouth every morning.    dextran 70-hypromellose 0.1-0.3 % Drop Place 1 drop into both eyes daily as needed.    ergocalciferol (VITAMIN D2) 50,000 unit Cap Take 1 capsule (50,000 Units total) by mouth every 7 days.    fish oil-omega-3 fatty acids 300-1,000 mg capsule Take 1 capsule by mouth every morning.    FLAXSEED OIL ORAL Take 1,200 mg by mouth every morning.    fluticasone-salmeterol diskus inhaler 250-50 mcg Inhale 1 puff into the lungs 2 (two) times daily.    furosemide (LASIX) 20 MG tablet Take 2 tablets (40 mg total) by mouth once daily.    ipratropium-albuteroL (COMBIVENT RESPIMAT)  mcg/actuation inhaler Inhale 2 puffs into the lungs every 4 (four) hours as needed for Shortness of Breath. Rescue    lisinopriL 10 MG tablet Take 1 tablet (10 mg total) by mouth once daily.    metoprolol succinate (TOPROL-XL) 50 MG 24 hr tablet Take 1 tablet (50 mg total) by mouth once daily.    NARCAN 4 mg/actuation Spry     nitroGLYCERIN 0.2 mg/hr TD PT24 (NITRODUR) 0.2 mg/hr APPLY 1 PATCH TOPICALLY ONCE DAILY TO LEG.    nitroGLYCERIN 0.4 MG/DOSE TL SPRY (NITROLINGUAL) 400 mcg/spray spray USE ONE SPRAY UNDER THE TONGUE EVERY 5 MINUTES AS NEEDED FOR CHEST PAIN.  DO NOT EXCEED A TOTAL OF 3 SPRAYS IN 15 MINUTES    ondansetron (ZOFRAN-ODT) 4 MG TbDL Take 2 tablets (8 mg total) by mouth every 6 (six) hours as needed.    pantoprazole (PROTONIX) 40 MG tablet Take 1 tablet (40 mg total) by mouth once daily.    PNV NO.115/IRON FUMARATE/FA (PRENATAL #115-IRON-FOLIC ACID ORAL) Take 1 tablet by mouth every morning.     temazepam (RESTORIL) 15 mg Cap Take 1 capsule (15 mg total) by mouth every evening.    ticagrelor (BRILINTA) 90 mg tablet Take 1 tablet (90 mg total) by mouth 2 (two) times a day.    triamcinolone acetonide 0.1% (KENALOG) 0.1 % cream Apply topically 2 (two) times daily. For legs.    umeclidinium (INCRUSE  ELLIPTA) 62.5 mcg/actuation inhalation capsule Inhale 1 capsule (63 mcg total) into the lungs every morning. Controller    vit C/E/Zn/coppr/lutein/zeaxan (PRESERVISION AREDS-2 ORAL) Take 1 capsule by mouth every morning.    [DISCONTINUED] benazepriL (LOTENSIN) 40 MG tablet Take 1 tablet (40 mg total) by mouth once daily.    [DISCONTINUED] lovastatin (MEVACOR) 40 MG tablet Take 0.5 tablets (20 mg total) by mouth every evening. (Patient taking differently: Take 40 mg by mouth every other day.)       Allergies:   Review of patient's allergies indicates:   Allergen Reactions    Iodinated contrast media     Strawberries [strawberry] Hives and Itching       ROS:  Review of Systems   Constitutional: Negative for appetite change, chills and fever.   HENT: Positive for hearing loss (Hearing aides). Negative for congestion and sore throat.    Eyes: Negative for redness and visual disturbance.   Respiratory: Positive for cough and shortness of breath.    Cardiovascular: Negative for chest pain and palpitations.   Gastrointestinal: Positive for diarrhea (since gallbladder removed) and nausea (Chronic). Negative for abdominal pain and vomiting.   Genitourinary: Negative for difficulty urinating, dysuria and urgency.   Musculoskeletal: Negative for arthralgias and back pain.   Skin: Negative for pallor and rash.   Allergic/Immunologic: Negative for environmental allergies and food allergies.   Neurological: Positive for weakness and light-headedness (when getting out of bed). Negative for dizziness.   Hematological: Does not bruise/bleed easily.   Psychiatric/Behavioral: Negative for sleep disturbance. The patient is not nervous/anxious.          Objective:  Temp:  [98.2 °F (36.8 °C)-98.3 °F (36.8 °C)]   Pulse:  [66-67]   Resp:  [18]   BP: (120-121)/(54-59)   SpO2:  [97 %-100 %]     Body mass index is 32.61 kg/m².      Physical Exam  Vitals and nursing note reviewed.   Constitutional:       General: She is not in acute  distress.     Appearance: She is well-developed.      Interventions: Nasal cannula in place.   HENT:      Head: Normocephalic and atraumatic.      Right Ear: External ear normal.      Left Ear: External ear normal.      Nose: No nasal deformity or mucosal edema.      Mouth/Throat:      Mouth: Mucous membranes are moist.      Pharynx: Uvula midline. No oropharyngeal exudate.   Eyes:      General: No scleral icterus.     Conjunctiva/sclera: Conjunctivae normal.      Pupils: Pupils are equal, round, and reactive to light.   Neck:      Trachea: Phonation normal.   Cardiovascular:      Rate and Rhythm: Normal rate and regular rhythm.      Heart sounds: S1 normal and S2 normal. No murmur heard.  Pulmonary:      Effort: Pulmonary effort is normal. No respiratory distress.      Breath sounds: Examination of the right-lower field reveals rhonchi. Examination of the left-lower field reveals rhonchi. Rhonchi present. No wheezing or rales.   Chest:   Breasts:      Right: No supraclavicular adenopathy.      Left: No supraclavicular adenopathy.       Abdominal:      General: Bowel sounds are normal. There is distension.      Palpations: Abdomen is soft.      Tenderness: There is no abdominal tenderness. There is no guarding or rebound.      Comments: Hematoma present   Musculoskeletal:         General: No tenderness.      Cervical back: Neck supple. No muscular tenderness.      Right lower leg: No edema.      Left lower leg: No edema.   Lymphadenopathy:      Cervical:      Right cervical: No superficial cervical adenopathy.     Left cervical: No superficial cervical adenopathy.      Upper Body:      Right upper body: No supraclavicular adenopathy.      Left upper body: No supraclavicular adenopathy.   Skin:     General: Skin is warm and dry.      Findings: Bruising present. No rash.   Neurological:      Mental Status: She is alert and oriented to person, place, and time.      Motor: No tremor or seizure activity.   Psychiatric:          Mood and Affect: Mood normal.         Behavior: Behavior normal. Behavior is cooperative.         Thought Content: Thought content normal.         Significant Labs:   A1C:   Recent Labs   Lab 01/27/22  1141   HGBA1C 5.2     TSH:   Recent Labs   Lab 09/21/21  1100   TSH 0.340     CBC:  Recent Labs   Lab 02/24/22  0500   WBC 13.38*   HGB 9.0*   HCT 28.9*      MCV 90     BMP:  Recent Labs   Lab 02/24/22  0500 02/25/22  0446   GLU 84 94   * 132*   K 4.3 4.1   CL 92* 93*   CO2 28 25   BUN 94* 95*   CREATININE 3.1* 2.6*   CALCIUM 8.6* 8.2*   MG 1.4*  --    PHOS 4.3  --        Significant Imaging: I have reviewed all pertinent imaging results/findings completed during prior hospitalization.      Assessment and Plan:  Active Diagnoses:    Diagnosis Date Noted POA    PRINCIPAL PROBLEM:  NSTEMI (non-ST elevated myocardial infarction) [I21.4] 02/11/2022 Yes    Rectus sheath hematoma [S30.1XXA] 02/14/2022 Yes    Acute on chronic respiratory failure [J96.20] 02/10/2022 Yes    Acute on chronic diastolic congestive heart failure [I50.33] 09/17/2019 Yes    Chronic hypoxemic respiratory failure [J96.11] 01/16/2019 Yes    Chronic respiratory failure [J96.10] 04/25/2018 Yes    CKD (chronic kidney disease), stage III [N18.30] 10/08/2014 Yes    Diabetic peripheral vascular disease [E11.51] 06/26/2014 Yes    Coronary artery disease, multivessel with history of previous PCI [I25.10] 06/03/2013 Yes    DM type 2, controlled, with complication [E11.8] 06/03/2013 Yes      Problems Resolved During this Admission:       NSTEMI (non-ST elevated myocardial infarction)  Coronary artery disease,  multivessel with history of previous PCI  - Recent hx of increasing episodes of angina requiring more frequent Nitroglycerin use and underwent LHC at OSH.   -Recent Left heart cath[02/11/22] showed:  · The RPAV lesion was 100% stenosed.  · The RPDA lesion was 100% stenosed.  · The Prox Cx to Mid Cx lesion was 80%  stenosed.  · The 1st LPL lesion was 90% stenosed.  · The Prox RCA-2 lesion was 70% stenosed.  · The Prox RCA-1 lesion was 90% stenosed.  · The Mid LAD-2 lesion was 60% stenosed.  - continue 81 mg ASA qd, Atorvastatin 80mg qhs, Metoprolol 50 mg XL, Ticagrelor 90mg bid  - LHC deferred given rectus sheath hematoma.   - Plan for Interventional Cardiology intervention 1-2 weeks after discharge as outpatient.     Acute on chronic diastolic congestive heart failure  - TTE notable for Grade II left ventricular diastolic dysfunction, EF 65%  - continue Lasix 40 mg BID, lisinopril 10 mg daily  - Cardiac diet with Fluid restriction at 1.5L with strict I/Os and daily STANDING weights     Essential hypertension, benign  - home regimen with amlodipine 5 mg daily, benazepril 40 mg daily  - continue amlodipine 10 mg daily, lisinopril 10 mg daily, metoprolol XL 50 mg daily     COPD/emphysema  Chronic hypoxemic respiratory failure  - Known history of severe COPD (GOLD IV D PFT 2020). On home oxygen (2-3L)  - Home medications: albuterol (VENTOLIN HFA) 90 mcg/actuation inhaler, ipratropium-albuteroL (COMBIVENT RESPIMAT)  mcg/actuation inhaler,  umeclidinium (INCRUSE ELLIPTA) 62.5 mcg/actuation inhalation capsule, fluticasone-salmeterol diskus inhaler 250-50 mcg  - continue Breo and Spiriva inhaler   - Continue DuoNebs PRN     DM type 2, controlled, with complication  - last A1c 5.2 on 01/27/2022  - diabetic diet, Accu-Cheks AC/HS  - continue LDSSI PRN      CKD (chronic kidney disease), stage III  - Baseline sCr 1.5- 1.8   - continue to monitor twice weekly BMPs, avoid nephrotoxic agents, renally dose medications when appropriate     Dysuria - Resolved  - UA negative for infection     Diarrhea  Nausea  May be related to excess bile  Will start on cholestyramine BID to see if it helps     Gastroesophageal reflux disease without esophagitis  - continue Protonix 40 mg daily     Hypercholesterolemia  - continue Atorvastatin  40mg     Obesity  - BMI currently 33.94  - RD following, weight loss encouraged     Rectus sheath hematoma  - Patient had severe abdominal pain the day before with a mass on the abdomen. Patient was unable to have a BM yesterday. KUB was drawn and showed a large stool burden. Continued bowel regimen but pain was persistent. CT abdomen without contrast and lactic drawn. CT abdomen showed rectal sheath hematoma extending to the pelvis. IR consulted and recommended CTA of the abdomen to identify bleeding vessel. Due to kidney function, held off on CTA for concerns of contrast induced nephropathy as patient had a Alfredo score of 16 with 100cc of contrast, 15 with 75 cc contrast. IR consulted and following. Hgb went from 13 on admission --> 10.6 --> 9.9 --> 8.9. hematoma was expanding. IR performed embolization of inferior epigastric and collateral vessels.   - continue to monitor abdominal mass     Venous stasis  - Chronic history of venous stasis of bilateral lower extremities limited to breakdown of skin without varicose veins  - Patient denies increase in leg swelling over her baseline  - wound care RN following     Vitamin-D deficiency  - continue ergocalciferol 63014 units weekly     Debility   - Continue with PT/OT for gait training and strengthening and restoration of ADL's   - Encourage mobility, OOB in chair, and early ambulation as appropriate  - Fall precautions   - Monitor for bowel and bladder dysfunction  - Monitor for and prevent skin breakdown and pressure ulcers  - Continue DVT prophylaxis with ASA/ticagrelor, frequent ambulation      Anticipated Disposition:  Home with home health      Future Appointments   Date Time Provider Department Center   3/17/2022 10:40 AM Scott Chappell MD [facility] JEYSON Muniz [facility]   4/27/2022 11:20 AM Antonieta Marquez NP [facility]       I certify that SNF services are required to be given on an inpatient basis because Marisol Kerr needs for skilled nursing  care and/or skilled rehabilitation are required on a daily basis and such services can only practically be provided in a skilled nursing facility setting and are for an ongoing condition for which she received inpatient care in the hospital.       Truman Sanchez MD  Department of [facility]   [facility] - Skilled Nursing

## 2022-02-25 NOTE — PT/OT/SLP PROGRESS
Occupational Therapy   Treatment    Name: Marisol Kerr  MRN: [id number]  Admit Date: 2/21/2022  Admitting Diagnosis:  NSTEMI (non-ST elevated myocardial infarction)    General Precautions: Standard, fall   Orthopedic Precautions:N/A   Braces: N/A     Recommendations:     Discharge Recommendations: home health OT  Level of Assistance Recommended at Discharge: Intermittent assistance for ADL's and homemaking tasks  Discharge Equipment Recommendations:  none (Pt has BSC, shower chair, grab bars, a RW, and a transport chair.)  Barriers to discharge:  Decreased caregiver support    Assessment:     Marisol Kerr is a 76 y.o. female with a medical diagnosis of NSTEMI (non-ST elevated myocardial infarction). Performance deficits affecting function are weakness, impaired endurance, impaired self care skills, impaired functional mobilty, gait instability, impaired balance, decreased coordination, decreased lower extremity function, decreased ROM, impaired skin, impaired cardiopulmonary response to activity. Pt tolerated session well on this day, but continues to require verbal cues for safety and for breathing techniques during functional activities. Pt continues to be limited due to fatigue and respiratory deficits, requiring seated rest breaks. Pt requires SBA for STS transfers with RW. Pt to continue OT POC to increase endurance and safety needed for the performance of ADLs.      Rehab Potential is good    Activity tolerance:  Good    Plan:     Patient to be seen 6 x/week to address the above listed problems via self-care/home management, therapeutic activities, therapeutic exercises    · Plan of Care Expires: 03/22/22  · Plan of Care Reviewed with: patient    Subjective     Communicated with: nurse prior to session.    Pain/Comfort:  · Pain Rating 1: 0/10  · Pain Rating Post-Intervention 1:  (pt did not rate)  · Location - Side 2: Bilateral  · Location - Orientation 2: generalized  · Location 2: back  · Pain Addressed 2:  Reposition, Distraction    Patient's cultural, spiritual, Taoism conflicts given the current situation:  · no (Baptism)    Objective:     Patient found up in chair with oxygen upon OT entry to room.    Bed Mobility:    · Pt seated in W/C at onset of therapy session.    Functional Mobility/Transfers:  · Patient completed Sit <> Stand Transfer with stand by assistance  with  rolling walker     Activities of Daily Living:  · Patient deferred due to performing ADLs prior to OT session on this day.     [facility] 6 Click ADL: 19    OT Exercises: UE Ergometer performed for 15 min at mod resistance to increase strength and endurance needed for the performance of ADLs.       Pt worked on functional standing activity consisting of standing with RW while reaching in all planes , crossing of midline and reaching to varying heights to facilitate (B) wt shifting and stability in standing in prep for performance of self care tasks and functional ADLS in standing.  Pt tolerated up to 10 min and 25 sec in standing with SBA and RW to steady. Pt required 1 seated rest break and c/o back pain throughout functional activity.     Treatment & Education:  Pt edu on POC, safety when performing self care tasks and safety when performing functional transfers and mobility.  - White board updated  - Self care tasks completed-- as noted above     Patient left up in chair with all lines intact and call button in reachEducation:      GOALS:   Multidisciplinary Problems     Occupational Therapy Goals        Problem: Occupational Therapy Goal    Goal Priority Disciplines Outcome Interventions   Occupational Therapy Goal     OT, PT/OT Ongoing, Progressing    Description: Goals to be met by: 3/8/22     Patient will increase functional independence with ADLs by performing:    UE Dressing with Modified [place].  LE Dressing with Supervision. Using hip kit to perform LBD.  Grooming while seated at sink with Modified [place].  Toileting from  toilet or BSC with Supervision for hygiene and clothing management.   Bathing from  sitting at sink with Minimal Assistance with (A) to wash distal LEs  Supine to sit with Modified [place].  Stand pivot transfers with Supervision with RW to steady..  Upper extremity exercise with UBE  for 10 minutes to increase functional endurance with supervision. MET  Caregiver will be educated on level of assist required to safely perform self care tasks and functional transfers..                      Time Tracking:     OT Date of Treatment: OT Date of Treatment: 02/25/22  OT Total Time (min): Total Time (min): 47 min    Billable Minutes:Therapeutic Activity 32  Therapeutic Exercise 15    2/25/2022

## 2022-02-25 NOTE — PLAN OF CARE
Problem: Occupational Therapy Goal  Goal: Occupational Therapy Goal  Description: Goals to be met by: 3/8/22     Patient will increase functional independence with ADLs by performing:    UE Dressing with Modified [place].  LE Dressing with Supervision. Using hip kit to perform LBD.  Grooming while seated at sink with Modified [place].  Toileting from toilet or BSC with Supervision for hygiene and clothing management.   Bathing from  sitting at sink with Minimal Assistance with (A) to wash distal LEs  Supine to sit with Modified [place].  Stand pivot transfers with Supervision with RW to steady..  Upper extremity exercise with UBE  for 10 minutes to increase functional endurance with supervision. MET  Caregiver will be educated on level of assist required to safely perform self care tasks and functional transfers..     Outcome: Ongoing, Progressing

## 2022-02-25 NOTE — PLAN OF CARE
Problem: Adult Inpatient Plan of Care  Goal: Absence of Hospital-Acquired Illness or Injury  Outcome: Ongoing, Progressing     Problem: Adult Inpatient Plan of Care  Goal: Optimal Comfort and Wellbeing  Outcome: Ongoing, Progressing     Problem: Adult Inpatient Plan of Care  Goal: Readiness for Transition of Care  Outcome: Ongoing, Progressing     Problem: Diabetes Comorbidity  Goal: Blood Glucose Level Within Targeted Range  Outcome: Ongoing, Progressing

## 2022-02-25 NOTE — H&P (VIEW-ONLY)
"Hospital Medicine  Skilled Nursing Facility   History and Physical Exam      Date of Service: 02/25/2022      Patient Name: Marisol Kerr  MRN: [id number]  Admission Date: 2/21/2022  Attending Physician: Truman Sanchez MD  Primary Care Provider: Khadar Dwyer MD  Code Status: Full Code      Principal problem:  NSTEMI (non-ST elevated myocardial infarction)      Chief Complaint:   Chief Complaint   Patient presents with    Weakness     Admitted to OS for rehabilitation following hospital stay for NSTEMI but had rectus sheath hematoma s/p embolization          HPI:   "Ms Kerr is a 76 year old female with PMHx of DM2, HTN, HLD, CKD, COPD on home O2 who presents SNF following hospitalization for NSTEMI.  Admission to SNF for secondary to weakness and debility.     Patient initially presented to [facility] for a left heart catheterization to evaluate recently increasing episodes of angina requiring more frequent Nitroglycerin use. She has history of iodine allergy and was pretreated with prednisone, but post- procedure she developed SOB, respiratory distress requiring overnight hospitalization for monitoring. She also had elevated blood pressures with systolics greater than 200s during hospitalization. Overnight, as she woke up to urinate she felt 10/10 chest pain all over that she described as "burning", and "veins being ripped apart". STAT EKG at the time showed concerns for  lateral precordial ST depressions. She was transferred to [facility] for further evaluation of her ACS and potential emergent intervention. LHC at outside hospital was notable for significant coraonary artery disease. Patient admitted to CCU while pending evaluation for ACS intervention, given her NSTEMI. Interventional Cardiology and Cardiothoracic Surgery consulted. Initiated guideline directed medical therapy for ACS.   CTS evaluation did not recommend patient for CABG due to her debility as she is prohibitively high risk for " "CABG. Patient had significant abdominal pain overnight and distended abdomen with mass. In light of this, IC deferred LHC. CT of the abdomen showed a large rectus sheath hematoma. Given patient's kidney function, CTA was deferred temporarily but the abdominal mass continued to enlarge. CTA of the abdomen showed extravasation of contrast into the rectus sheath hematoma. IR consulted and performed embolization of the inferior epigastric and collateral arteries. Patient's Cr elevated to 1.9 likely due to contrast. Patient's Hgb dropped requiring transfusion with aim >10 for IC intervention. Patient was given 2 pRBC and Hgb responded up to 9.2. IC recommended that since patient had a recent bleed, would defer procedure for outpatient in 1-2 weeks. Patient discharged to SNF and has appointment with Dr. Chappell for possible PCI on 3/17/2022.      Today, patient is without major complaints.  She denies chest pain at this time.  Currently wearing supplemental O2.  Later in the day, patient reported dysuria to nursing.  UA with reflex ordered." per Natalie Clemons NP on 2/22/22.     She is feeling well today. Denies any dysuria today. Says that she has had nausea and diarrhea since having her gallbladder was removed. She says that her breathing is at baseline and she is on her home oxygen. She does occasionally get lightheaded whenever she gets out of the bed. She is working well with therapy.     Patient admitted with skilled services with PT and OT to improve functional status and ability to perform ADLs.       Past Medical History:   Past Medical History:   Diagnosis Date    CAD (coronary artery disease)     Cancer     colon    CHF (congestive heart failure)     Colitis     Colon cancer     COPD (chronic obstructive pulmonary disease)     Decreased hearing     Diabetes mellitus     Diabetes mellitus, type 2     Hypercholesterolemia     Hypertension     Hypoxemia     Insomnia     Obesity     Osteoarthritis  "       Past Surgical History:   Past Surgical History:   Procedure Laterality Date    ANGIOGRAM, CORONARY, WITH LEFT HEART CATHETERIZATION N/A 2/10/2022    Procedure: Angiogram, Coronary, with Left Heart Cath;  Surgeon: Cristian Benjamin MD;  Location: [facility] CATH/EP LAB;  Service: Cardiology;  Laterality: N/A;    CARDIAC CATHETERIZATION      CHOLECYSTECTOMY      COLECTOMY      CORONARY ANGIOPLASTY      CORONARY STENT PLACEMENT      EXTERNAL EAR SURGERY      EYE SURGERY      FLEXIBLE SIGMOIDOSCOPY N/A 2019    Procedure: SIGMOIDOSCOPY, FLEXIBLE;  Surgeon: Biju Kramer III, MD;  Location: [facility] ENDO;  Service: Endoscopy;  Laterality: N/A;    HYSTERECTOMY      partial       Social History:   Tobacco Use    Smoking status: Former Smoker     Packs/day: 3.00     Years: 50.00     Pack years: 150.00     Types: Cigarettes     Start date: 3/30/1964     Quit date: 2006     Years since quittin.0    Smokeless tobacco: Never Used    Tobacco comment: ex smoker 15 years   Substance and Sexual Activity    Alcohol use: Yes    Drug use: No    Sexual activity: Never       Family History:   Family History     Problem Relation (Age of Onset)    Febrile seizures Mother    Heart failure Father          No current facility-administered medications on file prior to encounter.     Current Outpatient Medications on File Prior to Encounter   Medication Sig    acetaminophen (TYLENOL) 325 MG tablet Take 2 tablets (650 mg total) by mouth every 4 (four) hours as needed.    albuterol (VENTOLIN HFA) 90 mcg/actuation inhaler Inhale 2 puffs into the lungs every 6 (six) hours as needed for Wheezing or Shortness of Breath. Rescue    amLODIPine (NORVASC) 10 MG tablet Take 1 tablet (10 mg total) by mouth once daily.    aspirin (ECOTRIN) 81 MG EC tablet Take 81 mg by mouth every morning.     atorvastatin (LIPITOR) 80 MG tablet Take 1 tablet (80 mg total) by mouth every evening.    bromfenac (PROLENSA) 0.07 % Drop  Place 1 drop into both eyes daily as needed.    coenzyme Q10 100 mg capsule Take 100 mg by mouth every morning.    dextran 70-hypromellose 0.1-0.3 % Drop Place 1 drop into both eyes daily as needed.    ergocalciferol (VITAMIN D2) 50,000 unit Cap Take 1 capsule (50,000 Units total) by mouth every 7 days.    fish oil-omega-3 fatty acids 300-1,000 mg capsule Take 1 capsule by mouth every morning.    FLAXSEED OIL ORAL Take 1,200 mg by mouth every morning.    fluticasone-salmeterol diskus inhaler 250-50 mcg Inhale 1 puff into the lungs 2 (two) times daily.    furosemide (LASIX) 20 MG tablet Take 2 tablets (40 mg total) by mouth once daily.    ipratropium-albuteroL (COMBIVENT RESPIMAT)  mcg/actuation inhaler Inhale 2 puffs into the lungs every 4 (four) hours as needed for Shortness of Breath. Rescue    lisinopriL 10 MG tablet Take 1 tablet (10 mg total) by mouth once daily.    metoprolol succinate (TOPROL-XL) 50 MG 24 hr tablet Take 1 tablet (50 mg total) by mouth once daily.    NARCAN 4 mg/actuation Spry     nitroGLYCERIN 0.2 mg/hr TD PT24 (NITRODUR) 0.2 mg/hr APPLY 1 PATCH TOPICALLY ONCE DAILY TO LEG.    nitroGLYCERIN 0.4 MG/DOSE TL SPRY (NITROLINGUAL) 400 mcg/spray spray USE ONE SPRAY UNDER THE TONGUE EVERY 5 MINUTES AS NEEDED FOR CHEST PAIN.  DO NOT EXCEED A TOTAL OF 3 SPRAYS IN 15 MINUTES    ondansetron (ZOFRAN-ODT) 4 MG TbDL Take 2 tablets (8 mg total) by mouth every 6 (six) hours as needed.    pantoprazole (PROTONIX) 40 MG tablet Take 1 tablet (40 mg total) by mouth once daily.    PNV NO.115/IRON FUMARATE/FA (PRENATAL #115-IRON-FOLIC ACID ORAL) Take 1 tablet by mouth every morning.     temazepam (RESTORIL) 15 mg Cap Take 1 capsule (15 mg total) by mouth every evening.    ticagrelor (BRILINTA) 90 mg tablet Take 1 tablet (90 mg total) by mouth 2 (two) times a day.    triamcinolone acetonide 0.1% (KENALOG) 0.1 % cream Apply topically 2 (two) times daily. For legs.    umeclidinium (INCRUSE  ELLIPTA) 62.5 mcg/actuation inhalation capsule Inhale 1 capsule (63 mcg total) into the lungs every morning. Controller    vit C/E/Zn/coppr/lutein/zeaxan (PRESERVISION AREDS-2 ORAL) Take 1 capsule by mouth every morning.    [DISCONTINUED] benazepriL (LOTENSIN) 40 MG tablet Take 1 tablet (40 mg total) by mouth once daily.    [DISCONTINUED] lovastatin (MEVACOR) 40 MG tablet Take 0.5 tablets (20 mg total) by mouth every evening. (Patient taking differently: Take 40 mg by mouth every other day.)       Allergies:   Review of patient's allergies indicates:   Allergen Reactions    Iodinated contrast media     Strawberries [strawberry] Hives and Itching       ROS:  Review of Systems   Constitutional: Negative for appetite change, chills and fever.   HENT: Positive for hearing loss (Hearing aides). Negative for congestion and sore throat.    Eyes: Negative for redness and visual disturbance.   Respiratory: Positive for cough and shortness of breath.    Cardiovascular: Negative for chest pain and palpitations.   Gastrointestinal: Positive for diarrhea (since gallbladder removed) and nausea (Chronic). Negative for abdominal pain and vomiting.   Genitourinary: Negative for difficulty urinating, dysuria and urgency.   Musculoskeletal: Negative for arthralgias and back pain.   Skin: Negative for pallor and rash.   Allergic/Immunologic: Negative for environmental allergies and food allergies.   Neurological: Positive for weakness and light-headedness (when getting out of bed). Negative for dizziness.   Hematological: Does not bruise/bleed easily.   Psychiatric/Behavioral: Negative for sleep disturbance. The patient is not nervous/anxious.          Objective:  Temp:  [98.2 °F (36.8 °C)-98.3 °F (36.8 °C)]   Pulse:  [66-67]   Resp:  [18]   BP: (120-121)/(54-59)   SpO2:  [97 %-100 %]     Body mass index is 32.61 kg/m².      Physical Exam  Vitals and nursing note reviewed.   Constitutional:       General: She is not in acute  distress.     Appearance: She is well-developed.      Interventions: Nasal cannula in place.   HENT:      Head: Normocephalic and atraumatic.      Right Ear: External ear normal.      Left Ear: External ear normal.      Nose: No nasal deformity or mucosal edema.      Mouth/Throat:      Mouth: Mucous membranes are moist.      Pharynx: Uvula midline. No oropharyngeal exudate.   Eyes:      General: No scleral icterus.     Conjunctiva/sclera: Conjunctivae normal.      Pupils: Pupils are equal, round, and reactive to light.   Neck:      Trachea: Phonation normal.   Cardiovascular:      Rate and Rhythm: Normal rate and regular rhythm.      Heart sounds: S1 normal and S2 normal. No murmur heard.  Pulmonary:      Effort: Pulmonary effort is normal. No respiratory distress.      Breath sounds: Examination of the right-lower field reveals rhonchi. Examination of the left-lower field reveals rhonchi. Rhonchi present. No wheezing or rales.   Chest:   Breasts:      Right: No supraclavicular adenopathy.      Left: No supraclavicular adenopathy.       Abdominal:      General: Bowel sounds are normal. There is distension.      Palpations: Abdomen is soft.      Tenderness: There is no abdominal tenderness. There is no guarding or rebound.      Comments: Hematoma present   Musculoskeletal:         General: No tenderness.      Cervical back: Neck supple. No muscular tenderness.      Right lower leg: No edema.      Left lower leg: No edema.   Lymphadenopathy:      Cervical:      Right cervical: No superficial cervical adenopathy.     Left cervical: No superficial cervical adenopathy.      Upper Body:      Right upper body: No supraclavicular adenopathy.      Left upper body: No supraclavicular adenopathy.   Skin:     General: Skin is warm and dry.      Findings: Bruising present. No rash.   Neurological:      Mental Status: She is alert and oriented to person, place, and time.      Motor: No tremor or seizure activity.   Psychiatric:          Mood and Affect: Mood normal.         Behavior: Behavior normal. Behavior is cooperative.         Thought Content: Thought content normal.         Significant Labs:   A1C:   Recent Labs   Lab 01/27/22  1141   HGBA1C 5.2     TSH:   Recent Labs   Lab 09/21/21  1100   TSH 0.340     CBC:  Recent Labs   Lab 02/24/22  0500   WBC 13.38*   HGB 9.0*   HCT 28.9*      MCV 90     BMP:  Recent Labs   Lab 02/24/22  0500 02/25/22  0446   GLU 84 94   * 132*   K 4.3 4.1   CL 92* 93*   CO2 28 25   BUN 94* 95*   CREATININE 3.1* 2.6*   CALCIUM 8.6* 8.2*   MG 1.4*  --    PHOS 4.3  --        Significant Imaging: I have reviewed all pertinent imaging results/findings completed during prior hospitalization.      Assessment and Plan:  Active Diagnoses:    Diagnosis Date Noted POA    PRINCIPAL PROBLEM:  NSTEMI (non-ST elevated myocardial infarction) [I21.4] 02/11/2022 Yes    Rectus sheath hematoma [S30.1XXA] 02/14/2022 Yes    Acute on chronic respiratory failure [J96.20] 02/10/2022 Yes    Acute on chronic diastolic congestive heart failure [I50.33] 09/17/2019 Yes    Chronic hypoxemic respiratory failure [J96.11] 01/16/2019 Yes    Chronic respiratory failure [J96.10] 04/25/2018 Yes    CKD (chronic kidney disease), stage III [N18.30] 10/08/2014 Yes    Diabetic peripheral vascular disease [E11.51] 06/26/2014 Yes    Coronary artery disease, multivessel with history of previous PCI [I25.10] 06/03/2013 Yes    DM type 2, controlled, with complication [E11.8] 06/03/2013 Yes      Problems Resolved During this Admission:       NSTEMI (non-ST elevated myocardial infarction)  Coronary artery disease,  multivessel with history of previous PCI  - Recent hx of increasing episodes of angina requiring more frequent Nitroglycerin use and underwent LHC at OSH.   -Recent Left heart cath[02/11/22] showed:  · The RPAV lesion was 100% stenosed.  · The RPDA lesion was 100% stenosed.  · The Prox Cx to Mid Cx lesion was 80%  stenosed.  · The 1st LPL lesion was 90% stenosed.  · The Prox RCA-2 lesion was 70% stenosed.  · The Prox RCA-1 lesion was 90% stenosed.  · The Mid LAD-2 lesion was 60% stenosed.  - continue 81 mg ASA qd, Atorvastatin 80mg qhs, Metoprolol 50 mg XL, Ticagrelor 90mg bid  - LHC deferred given rectus sheath hematoma.   - Plan for Interventional Cardiology intervention 1-2 weeks after discharge as outpatient.     Acute on chronic diastolic congestive heart failure  - TTE notable for Grade II left ventricular diastolic dysfunction, EF 65%  - continue Lasix 40 mg BID, lisinopril 10 mg daily  - Cardiac diet with Fluid restriction at 1.5L with strict I/Os and daily STANDING weights     Essential hypertension, benign  - home regimen with amlodipine 5 mg daily, benazepril 40 mg daily  - continue amlodipine 10 mg daily, lisinopril 10 mg daily, metoprolol XL 50 mg daily     COPD/emphysema  Chronic hypoxemic respiratory failure  - Known history of severe COPD (GOLD IV D PFT 2020). On home oxygen (2-3L)  - Home medications: albuterol (VENTOLIN HFA) 90 mcg/actuation inhaler, ipratropium-albuteroL (COMBIVENT RESPIMAT)  mcg/actuation inhaler,  umeclidinium (INCRUSE ELLIPTA) 62.5 mcg/actuation inhalation capsule, fluticasone-salmeterol diskus inhaler 250-50 mcg  - continue Breo and Spiriva inhaler   - Continue DuoNebs PRN     DM type 2, controlled, with complication  - last A1c 5.2 on 01/27/2022  - diabetic diet, Accu-Cheks AC/HS  - continue LDSSI PRN      CKD (chronic kidney disease), stage III  - Baseline sCr 1.5- 1.8   - continue to monitor twice weekly BMPs, avoid nephrotoxic agents, renally dose medications when appropriate     Dysuria - Resolved  - UA negative for infection     Diarrhea  Nausea  May be related to excess bile  Will start on cholestyramine BID to see if it helps     Gastroesophageal reflux disease without esophagitis  - continue Protonix 40 mg daily     Hypercholesterolemia  - continue Atorvastatin  40mg     Obesity  - BMI currently 33.94  - RD following, weight loss encouraged     Rectus sheath hematoma  - Patient had severe abdominal pain the day before with a mass on the abdomen. Patient was unable to have a BM yesterday. KUB was drawn and showed a large stool burden. Continued bowel regimen but pain was persistent. CT abdomen without contrast and lactic drawn. CT abdomen showed rectal sheath hematoma extending to the pelvis. IR consulted and recommended CTA of the abdomen to identify bleeding vessel. Due to kidney function, held off on CTA for concerns of contrast induced nephropathy as patient had a Alfredo score of 16 with 100cc of contrast, 15 with 75 cc contrast. IR consulted and following. Hgb went from 13 on admission --> 10.6 --> 9.9 --> 8.9. hematoma was expanding. IR performed embolization of inferior epigastric and collateral vessels.   - continue to monitor abdominal mass     Venous stasis  - Chronic history of venous stasis of bilateral lower extremities limited to breakdown of skin without varicose veins  - Patient denies increase in leg swelling over her baseline  - wound care RN following     Vitamin-D deficiency  - continue ergocalciferol 28737 units weekly     Debility   - Continue with PT/OT for gait training and strengthening and restoration of ADL's   - Encourage mobility, OOB in chair, and early ambulation as appropriate  - Fall precautions   - Monitor for bowel and bladder dysfunction  - Monitor for and prevent skin breakdown and pressure ulcers  - Continue DVT prophylaxis with ASA/ticagrelor, frequent ambulation      Anticipated Disposition:  Home with home health      Future Appointments   Date Time Provider Department Center   3/17/2022 10:40 AM Scott Chappell MD [facility] JEYSON Muniz [facility]   4/27/2022 11:20 AM Antonieta Marquez NP [facility]       I certify that SNF services are required to be given on an inpatient basis because Marisol Kerr needs for skilled nursing  care and/or skilled rehabilitation are required on a daily basis and such services can only practically be provided in a skilled nursing facility setting and are for an ongoing condition for which she received inpatient care in the hospital.       Truman Sanchez MD  Department of [facility] Medicine   [facility] [place] - Skilled Nursing

## 2022-02-26 LAB — BACTERIA UR CULT: ABNORMAL

## 2022-02-26 PROCEDURE — 97535 SELF CARE MNGMENT TRAINING: CPT

## 2022-02-26 PROCEDURE — 25000003 PHARM REV CODE 250: Performed by: NURSE PRACTITIONER

## 2022-02-26 PROCEDURE — 25000003 PHARM REV CODE 250: Performed by: HOSPITALIST

## 2022-02-26 PROCEDURE — 94761 N-INVAS EAR/PLS OXIMETRY MLT: CPT

## 2022-02-26 PROCEDURE — 97110 THERAPEUTIC EXERCISES: CPT

## 2022-02-26 PROCEDURE — 27000221 HC OXYGEN, UP TO 24 HOURS

## 2022-02-26 PROCEDURE — 11000004 HC SNF PRIVATE

## 2022-02-26 PROCEDURE — 99900035 HC TECH TIME PER 15 MIN (STAT)

## 2022-02-26 PROCEDURE — 25000242 PHARM REV CODE 250 ALT 637 W/ HCPCS: Performed by: NURSE PRACTITIONER

## 2022-02-26 RX ADMIN — FLUTICASONE FUROATE AND VILANTEROL TRIFENATATE 1 PUFF: 100; 25 POWDER RESPIRATORY (INHALATION) at 09:02

## 2022-02-26 RX ADMIN — PSYLLIUM HUSK 1 PACKET: 3.4 POWDER ORAL at 09:02

## 2022-02-26 RX ADMIN — TRIAMCINOLONE ACETONIDE: 1 CREAM TOPICAL at 09:02

## 2022-02-26 RX ADMIN — CEPHALEXIN 500 MG: 500 CAPSULE ORAL at 08:02

## 2022-02-26 RX ADMIN — HYDROCODONE BITARTRATE AND ACETAMINOPHEN 1 TABLET: 5; 325 TABLET ORAL at 08:02

## 2022-02-26 RX ADMIN — TIOTROPIUM BROMIDE INHALATION SPRAY 2 PUFF: 3.12 SPRAY, METERED RESPIRATORY (INHALATION) at 09:02

## 2022-02-26 RX ADMIN — CEPHALEXIN 500 MG: 500 CAPSULE ORAL at 09:02

## 2022-02-26 RX ADMIN — PANTOPRAZOLE SODIUM 40 MG: 40 TABLET, DELAYED RELEASE ORAL at 09:02

## 2022-02-26 RX ADMIN — TICAGRELOR 90 MG: 90 TABLET ORAL at 08:02

## 2022-02-26 RX ADMIN — Medication 1 CAPSULE: at 09:02

## 2022-02-26 RX ADMIN — TICAGRELOR 90 MG: 90 TABLET ORAL at 09:02

## 2022-02-26 RX ADMIN — ASPIRIN 81 MG CHEWABLE TABLET 81 MG: 81 TABLET CHEWABLE at 09:02

## 2022-02-26 RX ADMIN — METOPROLOL SUCCINATE 50 MG: 50 TABLET, EXTENDED RELEASE ORAL at 09:02

## 2022-02-26 RX ADMIN — Medication 6 MG: at 08:02

## 2022-02-26 RX ADMIN — CHOLESTYRAMINE 4 G: 4 POWDER, FOR SUSPENSION ORAL at 08:02

## 2022-02-26 RX ADMIN — TRIAMCINOLONE ACETONIDE: 1 CREAM TOPICAL at 08:02

## 2022-02-26 RX ADMIN — ATORVASTATIN CALCIUM 80 MG: 20 TABLET, FILM COATED ORAL at 08:02

## 2022-02-26 RX ADMIN — AMLODIPINE BESYLATE 10 MG: 10 TABLET ORAL at 09:02

## 2022-02-26 RX ADMIN — Medication 100 MG: at 08:02

## 2022-02-26 RX ADMIN — CHOLESTYRAMINE 4 G: 4 POWDER, FOR SUSPENSION ORAL at 09:02

## 2022-02-26 NOTE — PLAN OF CARE
Problem: Occupational Therapy Goal  Goal: Occupational Therapy Goal  Description: Goals to be met by: 3/8/22     Patient will increase functional independence with ADLs by performing:    UE Dressing with Modified [place].  LE Dressing with Supervision. Using hip kit to perform LBD.  Grooming while seated at sink with Modified [place]. MET  UPDATED: Grooming while standing at the sink with Supervision.   Toileting from toilet or BSC with Supervision for hygiene and clothing management.   Bathing from  sitting at sink with Minimal Assistance with (A) to wash distal LEs  Supine to sit with Modified [place].  Stand pivot transfers with Supervision with RW to steady.   Upper extremity exercise with UBE  for 10 minutes to increase functional endurance with supervision. MET  Caregiver will be educated on level of assist required to safely perform self care tasks and functional transfers..     Outcome: Ongoing, Progressing

## 2022-02-26 NOTE — PT/OT/SLP PROGRESS
Occupational Therapy   Treatment    Name: Marisol Kerr  MRN: [id number]  Admit Date: 2/21/2022  Admitting Diagnosis:  NSTEMI (non-ST elevated myocardial infarction)    General Precautions: Standard, fall   Orthopedic Precautions:N/A   Braces: N/A     Recommendations:     Discharge Recommendations: home health OT  Level of Assistance Recommended at Discharge: Intermittent assistance for ADL's and homemaking tasks  Discharge Equipment Recommendations:  none (Pt has BSC, shower chair, grab bars, a RW, and a transport chair.)  Barriers to discharge:  Decreased caregiver support    Assessment:     Marisol Kerr is a 76 y.o. female with a medical diagnosis of NSTEMI (non-ST elevated myocardial infarction).  Performance deficits affecting function are weakness, impaired endurance, impaired self care skills, impaired functional mobilty, gait instability, impaired balance, decreased coordination, decreased lower extremity function, decreased ROM, impaired skin, impaired cardiopulmonary response to activity. Pt tolerated session well and requires SBA for STS transfers and CGA for ambulation with RW for safety awareness. Pt to continue OT POC to increase independence and safety needed for the performance of ADLs.     Rehab Potential is good    Activity tolerance:  Good    Plan:     Patient to be seen 6 x/week to address the above listed problems via self-care/home management, therapeutic activities, therapeutic exercises    · Plan of Care Expires: 03/22/22  · Plan of Care Reviewed with: patient    Subjective     Communicated with: nurse prior to session.     Pain/Comfort:  · Pain Rating 1: 0/10  · Pain Rating Post-Intervention 1: 0/10    Patient's cultural, spiritual, Mandaen conflicts given the current situation:  · no (Jewish)    Objective:     Patient found up in chair with oxygen upon OT entry to room.    Bed Mobility:    · Pt seated in bedside chair at onset of therapy session.    Functional Mobility/Transfers:  · Patient  completed Sit <> Stand Transfer with stand by assistance  with  rolling walker   · Patient completed Toilet Transfer Step Transfer technique with contact guard assistance with  rolling walker  · Functional Mobility: Pt ambulated to the bathroom for toileting with CGA and RW.     Activities of Daily Living:  · Grooming: supervision to wash hands sitting at the sink.   · Upper Body Dressing: supervision for pt to beth pullover shirt.  · Lower Body Dressing: minimum assistance to pull pants over hips and steady while standing. Pt able to thread feet into pant legs and beth/doff socks with hip kit.   · Toileting: supervision for pt to complete pablito hygiene sitting on the commode.     [facility] 6 Click ADL: 21    OT Exercises: UE Ergometer performed for 10 min at mod resistance to increase strength and endurance needed for ADLs.     Pt performed dowel exercises with 1 pound dowel 2 sets of 10 reps performing shld Flex/ Extn, Horizontal Ab/Adduction, chest presses, and biceps curls.  No (A) needed to complete exercises but brief breaks provided between sets.      Treatment & Education:  Pt edu on POC, safety when performing self care tasks and safety when performing functional transfers and mobility.  - White board updated  - Self care tasks completed-- as noted above       Patient left up in chair with all lines intact and call button in reachEducation:      GOALS:   Multidisciplinary Problems     Occupational Therapy Goals        Problem: Occupational Therapy Goal    Goal Priority Disciplines Outcome Interventions   Occupational Therapy Goal     OT, PT/OT Ongoing, Progressing    Description: Goals to be met by: 3/8/22     Patient will increase functional independence with ADLs by performing:    UE Dressing with Modified [place].  LE Dressing with Supervision. Using hip kit to perform LBD.  Grooming while seated at sink with Modified [place]. MET  UPDATED: Grooming while standing at the sink with Supervision.    Toileting from toilet or BSC with Supervision for hygiene and clothing management.   Bathing from  sitting at sink with Minimal Assistance with (A) to wash distal LEs  Supine to sit with Modified [place].  Stand pivot transfers with Supervision with RW to steady.   Upper extremity exercise with UBE  for 10 minutes to increase functional endurance with supervision. MET  Caregiver will be educated on level of assist required to safely perform self care tasks and functional transfers..                      Time Tracking:     OT Date of Treatment: OT Date of Treatment: 02/26/22  OT Total Time (min): Total Time (min): 48 min    Billable Minutes:Self Care/Home Management 30  Therapeutic Exercise 18    2/26/2022

## 2022-02-26 NOTE — PLAN OF CARE
Problem: Adult Inpatient Plan of Care  Goal: Plan of Care Review  Outcome: Ongoing, Progressing  Goal: Patient-Specific Goal (Individualized)  Outcome: Ongoing, Progressing  Goal: Absence of Hospital-Acquired Illness or Injury  Outcome: Ongoing, Progressing  Goal: Optimal Comfort and Wellbeing  Outcome: Ongoing, Progressing     Problem: Diabetes Comorbidity  Goal: Blood Glucose Level Within Targeted Range  Outcome: Ongoing, Progressing     Problem: Fall Injury Risk  Goal: Absence of Fall and Fall-Related Injury  Outcome: Ongoing, Progressing     Problem: Skin Injury Risk Increased  Goal: Skin Health and Integrity  Outcome: Ongoing, Progressing     Problem: Impaired Wound Healing  Goal: Optimal Wound Healing  Outcome: Ongoing, Progressing

## 2022-02-27 PROCEDURE — 25000003 PHARM REV CODE 250: Performed by: NURSE PRACTITIONER

## 2022-02-27 PROCEDURE — 97116 GAIT TRAINING THERAPY: CPT

## 2022-02-27 PROCEDURE — 11000004 HC SNF PRIVATE

## 2022-02-27 PROCEDURE — 99900035 HC TECH TIME PER 15 MIN (STAT)

## 2022-02-27 PROCEDURE — 27000221 HC OXYGEN, UP TO 24 HOURS

## 2022-02-27 PROCEDURE — 94761 N-INVAS EAR/PLS OXIMETRY MLT: CPT

## 2022-02-27 PROCEDURE — 97110 THERAPEUTIC EXERCISES: CPT

## 2022-02-27 PROCEDURE — 25000003 PHARM REV CODE 250: Performed by: HOSPITALIST

## 2022-02-27 PROCEDURE — 97530 THERAPEUTIC ACTIVITIES: CPT

## 2022-02-27 PROCEDURE — 25000242 PHARM REV CODE 250 ALT 637 W/ HCPCS: Performed by: NURSE PRACTITIONER

## 2022-02-27 RX ADMIN — ASPIRIN 81 MG CHEWABLE TABLET 81 MG: 81 TABLET CHEWABLE at 08:02

## 2022-02-27 RX ADMIN — TICAGRELOR 90 MG: 90 TABLET ORAL at 08:02

## 2022-02-27 RX ADMIN — FLUTICASONE FUROATE AND VILANTEROL TRIFENATATE 1 PUFF: 100; 25 POWDER RESPIRATORY (INHALATION) at 09:02

## 2022-02-27 RX ADMIN — TRIAMCINOLONE ACETONIDE: 1 CREAM TOPICAL at 08:02

## 2022-02-27 RX ADMIN — CHOLESTYRAMINE 4 G: 4 POWDER, FOR SUSPENSION ORAL at 08:02

## 2022-02-27 RX ADMIN — Medication 100 MG: at 08:02

## 2022-02-27 RX ADMIN — AMLODIPINE BESYLATE 10 MG: 10 TABLET ORAL at 08:02

## 2022-02-27 RX ADMIN — Medication 6 MG: at 08:02

## 2022-02-27 RX ADMIN — TIOTROPIUM BROMIDE INHALATION SPRAY 2 PUFF: 3.12 SPRAY, METERED RESPIRATORY (INHALATION) at 09:02

## 2022-02-27 RX ADMIN — Medication 1 CAPSULE: at 08:02

## 2022-02-27 RX ADMIN — ONDANSETRON 8 MG: 8 TABLET, ORALLY DISINTEGRATING ORAL at 08:02

## 2022-02-27 RX ADMIN — PANTOPRAZOLE SODIUM 40 MG: 40 TABLET, DELAYED RELEASE ORAL at 08:02

## 2022-02-27 RX ADMIN — PSYLLIUM HUSK 1 PACKET: 3.4 POWDER ORAL at 08:02

## 2022-02-27 RX ADMIN — CEPHALEXIN 500 MG: 500 CAPSULE ORAL at 08:02

## 2022-02-27 RX ADMIN — ATORVASTATIN CALCIUM 80 MG: 20 TABLET, FILM COATED ORAL at 08:02

## 2022-02-27 RX ADMIN — ACETAMINOPHEN 650 MG: 325 TABLET ORAL at 08:02

## 2022-02-27 RX ADMIN — METOPROLOL SUCCINATE 50 MG: 50 TABLET, EXTENDED RELEASE ORAL at 08:02

## 2022-02-27 NOTE — PT/OT/SLP PROGRESS
Physical Therapy Treatment    Patient Name:  Marisol Kerr   MRN:  [id number]  Admit Date: 2/21/2022  Admitting Diagnosis: NSTEMI (non-ST elevated myocardial infarction)  Recent Surgeries: N/A    General Precautions: Standard, fall, hearing impaired   Orthopedic Precautions:N/A   Braces: N/A     Recommendations:     Discharge Recommendations:  home health PT   Level of Assistance Recommended at Discharge: Intermittent assistance   Discharge Equipment Recommendations: none   Barriers to discharge: Decreased caregiver support    Assessment:     Marisol Kerr is a 76 y.o. female admitted with a medical diagnosis of NSTEMI (non-ST elevated myocardial infarction) . Pt continues to make good functional progress needing SBA for transfers and GT with a RW, and will therefore benefit form continued snf rehab to help improve her overall functional mobility, endurance and general strength .      Performance deficits affecting function:  weakness, impaired endurance, impaired self care skills, impaired functional mobilty, gait instability, impaired balance, decreased lower extremity function, impaired cardiopulmonary response to activity .    Rehab Potential is good    Activity Tolerance: Good    Plan:     Patient to be seen 6 x/week to address the above listed problems via gait training, therapeutic activities, therapeutic exercises, neuromuscular re-education, wheelchair management/training    · Plan of Care Expires: 03/08/22  · Plan of Care Reviewed with: patient    Subjective     Pt agreeable to work with PT.     Pain/Comfort:  · Pain Rating 1: 0/10  · Pain Rating Post-Intervention 1: 0/10    Patient's cultural, spiritual, Zoroastrianism conflicts given the current situation:  · no    Objective:     Communicated with pt's nurse prior to session.  Patient found up in chair with oxygen upon PT entry to room.     Therapeutic Activities and Exercises: Mini-eliptical x 15mins to help improve B LE MMT and endurance. Resistance set where  pt. Was working at a Modified [place] of 3.  Toileting with pt using RW and SBA for safety    Functional Mobility:  · Transfers:     · Sit to Stand from B[place]air and toilet:  stand by assistance with rolling walker  · Bed to Chair: stand by assistance with  rolling walker  using  Stand Pivot  · Toilet Transfer: stand by assistance with  rolling walker  using  Stand Pivot  · Gait: ~ 15ft (from BSchair>bathroom toilet)+ 50ft + 30ft with RW and SBA for safety. pt needing seated rest breaks d.t SOB and fatigue.    AM-PAC 6 CLICK MOBILITY  16    Patient left up in chair with all lines intact and call button in reach.    GOALS:   Multidisciplinary Problems     Physical Therapy Goals        Problem: Physical Therapy Goal    Goal Priority Disciplines Outcome Goal Variances Interventions   Physical Therapy Goal     PT, PT/OT Ongoing, Progressing     Description: Goals to be met by: 3/8/22    Patient will increase functional independence with mobility by performing:    . Supine to sit with [place]  . Sit to supine with [place]  . Rolling to Left and Right with [place].  . Sit to stand transfer with Supervision  . Bed to chair transfer with Supervision using Rolling Walker  . Gait  x 100 feet with Supervision using Rolling Walker.   . Wheelchair propulsion x50 feet with Supervision using bilateral uppper extremities  . Ascend/Descend 4 inch curb step with Contact Guard Assistance using Rolling Walker.  . Retrieve object off floor with RW and reacher and SBA.                     Time Tracking:     PT Received On: 02/27/22  PT Total Time (min):     Billable Minutes: Gait Training 15mins, Therapeutic Activity 9mins and Therapeutic Exercise 15mins    Treatment Type: Treatment  PT/PTA: PT     PTA Visit Number: 0     02/27/2022

## 2022-02-28 ENCOUNTER — LAB VISIT (OUTPATIENT)
Dept: LAB | Facility: OTHER | Age: 77
End: 2022-02-28
Payer: MEDICARE

## 2022-02-28 DIAGNOSIS — Z20.822 ENCOUNTER FOR LABORATORY TESTING FOR COVID-19 VIRUS: ICD-10-CM

## 2022-02-28 LAB
ANION GAP SERPL CALC-SCNC: 11 MMOL/L (ref 8–16)
BASOPHILS # BLD AUTO: 0.04 K/UL (ref 0–0.2)
BASOPHILS NFR BLD: 0.7 % (ref 0–1.9)
BUN SERPL-MCNC: 67 MG/DL (ref 8–23)
CALCIUM SERPL-MCNC: 8.9 MG/DL (ref 8.7–10.5)
CHLORIDE SERPL-SCNC: 106 MMOL/L (ref 95–110)
CO2 SERPL-SCNC: 23 MMOL/L (ref 23–29)
CREAT SERPL-MCNC: 1.6 MG/DL (ref 0.5–1.4)
DIFFERENTIAL METHOD: ABNORMAL
EOSINOPHIL # BLD AUTO: 0.2 K/UL (ref 0–0.5)
EOSINOPHIL NFR BLD: 3 % (ref 0–8)
ERYTHROCYTE [DISTWIDTH] IN BLOOD BY AUTOMATED COUNT: 14.3 % (ref 11.5–14.5)
EST. GFR  (AFRICAN AMERICAN): 36.3 ML/MIN/1.73 M^2
EST. GFR  (NON AFRICAN AMERICAN): 31.5 ML/MIN/1.73 M^2
GLUCOSE SERPL-MCNC: 86 MG/DL (ref 70–110)
HCT VFR BLD AUTO: 29.3 % (ref 37–48.5)
HGB BLD-MCNC: 8.9 G/DL (ref 12–16)
IMM GRANULOCYTES # BLD AUTO: 0.04 K/UL (ref 0–0.04)
IMM GRANULOCYTES NFR BLD AUTO: 0.7 % (ref 0–0.5)
LYMPHOCYTES # BLD AUTO: 1 K/UL (ref 1–4.8)
LYMPHOCYTES NFR BLD: 15.6 % (ref 18–48)
MAGNESIUM SERPL-MCNC: 1.8 MG/DL (ref 1.6–2.6)
MCH RBC QN AUTO: 28.4 PG (ref 27–31)
MCHC RBC AUTO-ENTMCNC: 30.4 G/DL (ref 32–36)
MCV RBC AUTO: 94 FL (ref 82–98)
MONOCYTES # BLD AUTO: 0.5 K/UL (ref 0.3–1)
MONOCYTES NFR BLD: 7.4 % (ref 4–15)
NEUTROPHILS # BLD AUTO: 4.4 K/UL (ref 1.8–7.7)
NEUTROPHILS NFR BLD: 72.6 % (ref 38–73)
NRBC BLD-RTO: 0 /100 WBC
PHOSPHATE SERPL-MCNC: 3.2 MG/DL (ref 2.7–4.5)
PLATELET # BLD AUTO: 174 K/UL (ref 150–450)
PMV BLD AUTO: 12.7 FL (ref 9.2–12.9)
POTASSIUM SERPL-SCNC: 4.6 MMOL/L (ref 3.5–5.1)
RBC # BLD AUTO: 3.13 M/UL (ref 4–5.4)
SARS-COV-2 RNA RESP QL NAA+PROBE: NOT DETECTED
SARS-COV-2- CYCLE NUMBER: NORMAL
SODIUM SERPL-SCNC: 140 MMOL/L (ref 136–145)
WBC # BLD AUTO: 6.09 K/UL (ref 3.9–12.7)

## 2022-02-28 PROCEDURE — 25000003 PHARM REV CODE 250: Performed by: NURSE PRACTITIONER

## 2022-02-28 PROCEDURE — 97110 THERAPEUTIC EXERCISES: CPT | Mod: CQ

## 2022-02-28 PROCEDURE — 99900035 HC TECH TIME PER 15 MIN (STAT)

## 2022-02-28 PROCEDURE — 80048 BASIC METABOLIC PNL TOTAL CA: CPT | Performed by: HOSPITALIST

## 2022-02-28 PROCEDURE — 97535 SELF CARE MNGMENT TRAINING: CPT

## 2022-02-28 PROCEDURE — 97530 THERAPEUTIC ACTIVITIES: CPT

## 2022-02-28 PROCEDURE — 25000003 PHARM REV CODE 250: Performed by: HOSPITALIST

## 2022-02-28 PROCEDURE — 94761 N-INVAS EAR/PLS OXIMETRY MLT: CPT

## 2022-02-28 PROCEDURE — 27000221 HC OXYGEN, UP TO 24 HOURS

## 2022-02-28 PROCEDURE — U0003 INFECTIOUS AGENT DETECTION BY NUCLEIC ACID (DNA OR RNA); SEVERE ACUTE RESPIRATORY SYNDROME CORONAVIRUS 2 (SARS-COV-2) (CORONAVIRUS DISEASE [COVID-19]), AMPLIFIED PROBE TECHNIQUE, MAKING USE OF HIGH THROUGHPUT TECHNOLOGIES AS DESCRIBED BY CMS-2020-01-R: HCPCS | Performed by: EMERGENCY MEDICINE

## 2022-02-28 PROCEDURE — 83735 ASSAY OF MAGNESIUM: CPT | Performed by: HOSPITALIST

## 2022-02-28 PROCEDURE — 84100 ASSAY OF PHOSPHORUS: CPT | Performed by: HOSPITALIST

## 2022-02-28 PROCEDURE — 97530 THERAPEUTIC ACTIVITIES: CPT | Mod: CQ

## 2022-02-28 PROCEDURE — 25000242 PHARM REV CODE 250 ALT 637 W/ HCPCS: Performed by: NURSE PRACTITIONER

## 2022-02-28 PROCEDURE — 97110 THERAPEUTIC EXERCISES: CPT

## 2022-02-28 PROCEDURE — 97116 GAIT TRAINING THERAPY: CPT | Mod: CQ

## 2022-02-28 PROCEDURE — 36415 COLL VENOUS BLD VENIPUNCTURE: CPT | Performed by: HOSPITALIST

## 2022-02-28 PROCEDURE — 11000004 HC SNF PRIVATE

## 2022-02-28 PROCEDURE — 85025 COMPLETE CBC W/AUTO DIFF WBC: CPT | Performed by: HOSPITALIST

## 2022-02-28 RX ORDER — GABAPENTIN 100 MG/1
100 CAPSULE ORAL NIGHTLY
Status: DISCONTINUED | OUTPATIENT
Start: 2022-02-28 | End: 2022-03-16 | Stop reason: HOSPADM

## 2022-02-28 RX ADMIN — Medication 6 MG: at 10:02

## 2022-02-28 RX ADMIN — TICAGRELOR 90 MG: 90 TABLET ORAL at 10:02

## 2022-02-28 RX ADMIN — FLUTICASONE FUROATE AND VILANTEROL TRIFENATATE 1 PUFF: 100; 25 POWDER RESPIRATORY (INHALATION) at 10:02

## 2022-02-28 RX ADMIN — ATORVASTATIN CALCIUM 80 MG: 20 TABLET, FILM COATED ORAL at 10:02

## 2022-02-28 RX ADMIN — TRIAMCINOLONE ACETONIDE: 1 CREAM TOPICAL at 10:02

## 2022-02-28 RX ADMIN — Medication 100 MG: at 09:02

## 2022-02-28 RX ADMIN — CEPHALEXIN 500 MG: 500 CAPSULE ORAL at 10:02

## 2022-02-28 RX ADMIN — METOPROLOL SUCCINATE 50 MG: 50 TABLET, EXTENDED RELEASE ORAL at 10:02

## 2022-02-28 RX ADMIN — CHOLESTYRAMINE 4 G: 4 POWDER, FOR SUSPENSION ORAL at 09:02

## 2022-02-28 RX ADMIN — ASPIRIN 81 MG CHEWABLE TABLET 81 MG: 81 TABLET CHEWABLE at 10:02

## 2022-02-28 RX ADMIN — AMLODIPINE BESYLATE 10 MG: 10 TABLET ORAL at 10:02

## 2022-02-28 RX ADMIN — TRIAMCINOLONE ACETONIDE: 1 CREAM TOPICAL at 09:02

## 2022-02-28 RX ADMIN — Medication 1 CAPSULE: at 09:02

## 2022-02-28 RX ADMIN — TIOTROPIUM BROMIDE INHALATION SPRAY 2 PUFF: 3.12 SPRAY, METERED RESPIRATORY (INHALATION) at 10:02

## 2022-02-28 RX ADMIN — PSYLLIUM HUSK 1 PACKET: 3.4 POWDER ORAL at 10:02

## 2022-02-28 RX ADMIN — PANTOPRAZOLE SODIUM 40 MG: 40 TABLET, DELAYED RELEASE ORAL at 10:02

## 2022-02-28 RX ADMIN — GABAPENTIN 100 MG: 100 CAPSULE ORAL at 10:02

## 2022-02-28 RX ADMIN — ONDANSETRON 8 MG: 8 TABLET, ORALLY DISINTEGRATING ORAL at 10:02

## 2022-02-28 RX ADMIN — CHOLESTYRAMINE 4 G: 4 POWDER, FOR SUSPENSION ORAL at 10:02

## 2022-02-28 NOTE — PT/OT/SLP PROGRESS
Occupational Therapy   Treatment    Name: Marisol Kerr  MRN: [id number]  Admit Date: 2/21/2022  Admitting Diagnosis:  NSTEMI (non-ST elevated myocardial infarction)    General Precautions: Standard, fall   Orthopedic Precautions:N/A   Braces: N/A     Recommendations:     Discharge Recommendations: home health OT  Level of Assistance Recommended at Discharge: Intermittent assistance for ADL's and homemaking tasks  Discharge Equipment Recommendations:  none (Pt has BSC, shower chair, grab bars, a RW, and a transport chair.)  Barriers to discharge:  Decreased caregiver support    Assessment:     Marisol Kerr is a 76 y.o. female with a medical diagnosis of NSTEMI (non-ST elevated myocardial infarction).  Performance deficits affecting function are weakness, impaired endurance, impaired self care skills, impaired functional mobilty, gait instability, impaired balance, decreased coordination, decreased lower extremity function, decreased ROM, impaired skin, impaired cardiopulmonary response to activity. Patient tolerated treatment well and continues to be limited by decreased endurance/standing tolerance which effects the performance of ADLs. Pt requires cueing throughout session for pursed lip breathing due to SOB during functional activities. Pt requires SBA/CGA for ambulation for safety awareness and RW management. Pt to continue OT POC to increase endurance and safety needed for the performance of ADLs.     Rehab Potential is good    Activity tolerance:  Good    Plan:     Patient to be seen 6 x/week to address the above listed problems via self-care/home management, therapeutic activities, therapeutic exercises    · Plan of Care Expires: 03/22/22  · Plan of Care Reviewed with: patient    Subjective     Communicated with: nurse prior to session.    Pain/Comfort:  · Pain Rating 1:  (pt did not rate)  · Location - Side 1: Bilateral  · Location - Orientation 1: generalized  · Location 1: foot  · Pain Addressed 1:  Distraction, Reposition  · Pain Rating Post-Intervention 1:  (pt did not rate)    Patient's cultural, spiritual, Jewish conflicts given the current situation:  · no (Mormon)    Objective:     Patient found on commode in bathroom with oxygen upon OT entry to room.    Bed Mobility:    · Pt seated on commode in bathroom at onset of therapy session.    Functional Mobility/Transfers:  · Patient completed Sit <> Stand Transfer with stand by assistance  with  rolling walker   · Patient completed Toilet Transfer Step Transfer technique with contact guard assistance with  rolling walker  · Functional Mobility: Pt ambulated from commode>sink>bedsisde chair ~12 steps with SBA/CGA with RW.     Activities of Daily Living:  · Grooming: stand by assistance for pt to complete hand washing in standing at the sink.   · Lower Body Dressing: supervision for pt to beth/doff socks using the hip kit.   · Toileting: minimum assistance to thouroughly clean pablito rear area while pt in standing with RW to steady. Max assist for diaper management.     [facility] 6 Click ADL: 21    OT Exercises:   Pt performed dowel exercises with 1 pound dowel x 2 sets of 10 reps performing shld Flex/ Extn, chest presses, and biceps curls.  No (A) needed to complete exercises but brief breaks provided between sets.    Pt worked on functional standing activity consisting of folding laundry at raised tray table standing with RW while reaching in all planes , crossing of midline and reaching to varying heights to facilitate (B) wt shifting and stability in standing in prep for performance of self care tasks and functional ADLS in standing. Pt tolerated up to  5 min and 40 sec in standing with SBA  and RW to steady.     Treatment & Education:  Pt edu on POC, safety when performing self care tasks and safety when performing functional transfers and mobility.  - White board updated  - Self care tasks completed-- as noted above         Patient left up in chair with  all lines intact and call button in reachEducation:      GOALS:   Multidisciplinary Problems     Occupational Therapy Goals        Problem: Occupational Therapy Goal    Goal Priority Disciplines Outcome Interventions   Occupational Therapy Goal     OT, PT/OT Ongoing, Progressing    Description: Goals to be met by: 3/8/22     Patient will increase functional independence with ADLs by performing:    UE Dressing with Modified [place].  LE Dressing with Supervision. Using hip kit to perform LBD.  Grooming while seated at sink with Modified [place]. MET  UPDATED: Grooming while standing at the sink with Supervision.   Toileting from toilet or BSC with Supervision for hygiene and clothing management.   Bathing from  sitting at sink with Minimal Assistance with (A) to wash distal LEs  Supine to sit with Modified [place].  Stand pivot transfers with Supervision with RW to steady.   Upper extremity exercise with UBE  for 10 minutes to increase functional endurance with supervision. MET  Caregiver will be educated on level of assist required to safely perform self care tasks and functional transfers..                      Time Tracking:     OT Date of Treatment: OT Date of Treatment: 02/28/22  OT Total Time (min): Total Time (min): 48 min    Billable Minutes:Self Care/Home Management 20  Therapeutic Activity 13  Therapeutic Exercise 15    2/28/2022

## 2022-02-28 NOTE — PT/OT/SLP PROGRESS
"Physical Therapy Treatment    Patient Name:  Marisol Kerr   MRN:  [id number]  Admit Date: 2/21/2022  Admitting Diagnosis: NSTEMI (non-ST elevated myocardial infarction)  Recent Surgeries:     General Precautions: Standard, fall, hearing impaired   Orthopedic Precautions:N/A   Braces:       Recommendations:     Discharge Recommendations:  home health PT   Level of Assistance Recommended at Discharge: Intermittent assistance   Discharge Equipment Recommendations: none   Barriers to discharge: Decreased caregiver support    Assessment:     Marisol Kerr is a 76 y.o. female admitted with a medical diagnosis of NSTEMI (non-ST elevated myocardial infarction) .Pt tolerated well, pt would continue to benefit from skilled PT services to improve overall functional mobility, strength and endurance.       Performance deficits affecting function:  weakness, impaired endurance, impaired self care skills, impaired functional mobilty, gait instability, impaired balance, decreased lower extremity function, impaired cardiopulmonary response to activity .    Rehab Potential is good    Activity Tolerance: Fair    Plan:     Patient to be seen 6 x/week to address the above listed problems via gait training, therapeutic activities, therapeutic exercises, neuromuscular re-education, wheelchair management/training    · Plan of Care Expires: 03/08/22  · Plan of Care Reviewed with: patient    Subjective     "my toes to my knees are burning" has in the past, worse now, NP came in during session and assessed.     Pain/Comfort:  ·      Patient's cultural, spiritual, Evangelical conflicts given the current situation:  · no    Objective:       Patient found with oxygen upon PT entry to room.     Therapeutic Activities and Exercises: 2x10 reps AP,LAQ mini elliptical x 10 minutes fwd/bwds    Functional Mobility: daug present ed on session, family trng scheduled Thursday  · Transfers:     · Sit to Stand:  stand by assistance with rolling walker  · Toilet " Transfer: stand by assistance with  rolling walker  using  Stand Pivot  · I with pablito care, CG/SBA with hand hygiene in standing with RW and total A with changing to new diaper  · Gait: amb with RW SBA ~ 55 ft in the room and 15 ft from bathroom to BSC 02 in tow  · Asc/manny over 3.5 curb step with RW CGA    AM-PAC 6 CLICK MOBILITY  17    Patient left up in chair with all lines intact, call button in reach, daug present and belonging sin reach.    GOALS:   Multidisciplinary Problems     Physical Therapy Goals        Problem: Physical Therapy Goal    Goal Priority Disciplines Outcome Goal Variances Interventions   Physical Therapy Goal     PT, PT/OT Ongoing, Progressing     Description: Goals to be met by: 3/8/22    Patient will increase functional independence with mobility by performing:    . Supine to sit with [place]  . Sit to supine with [place]  . Rolling to Left and Right with [place].  . Sit to stand transfer with Supervision  . Bed to chair transfer with Supervision using Rolling Walker  . Gait  x 100 feet with Supervision using Rolling Walker.   . Wheelchair propulsion x50 feet with Supervision using bilateral uppper extremities  . Ascend/Descend 4 inch curb step with Contact Guard Assistance using Rolling Walker.  . Retrieve object off floor with RW and reacher and SBA.                     Time Tracking:     PT Received On: 02/28/22  PT Total Time (min):       Billable Minutes: Gait Training 15, Therapeutic Activity 17 and Therapeutic Exercise 12    Treatment Type: Treatment  PT/PTA: PTA     PTA Visit Number: 1 02/28/2022

## 2022-02-28 NOTE — PROGRESS NOTES
"                                                        [facility]                                  Skilled Nursing Facility                   Progress Note     Admit Date: 2/21/2022  LUZ  TBD  Principal Problem:  NSTEMI (non-ST elevated myocardial infarction)   HPI obtained from patient interview and chart review     Chief Complaint: Re-evaluation of medical treatment and therapy status: Lab review, burning in bilateral feet and legs     HPI:   Ms Kerr is a 76 year old female with PMHx of DM2, HTN, HLD, CKD, COPD on home O2 who presents SNF following hospitalization for NSTEMI.  Admission to SNF for secondary to weakness and debility.    Interval history:  All of today's labs reviewed and are listed below.  BUN/Cr improving today. Cr 1.6 which is at her baseline. Patient complains of burning and tingling in her bilateral feet that has been going on "for a while". Will start a low dose of gabapentin at night. Reviewed side effects and adverse reactions with patient and dtr. Will obtain labs on 3/1 to monitor kidney function. 24 hr vital sign ranges listed below.     Patient denies shortness of breath, abdominal discomfort, nausea, or vomiting.  Patient reports an adequate appetite.  Patient denies dysuria.  Patient reports having regular bowel movements.  Patient progessing with PT/OT. Continuing to follow and treat all acute and chronic conditions.    Past Medical History: Patient has a past medical history of CAD (coronary artery disease), Cancer, CHF (congestive heart failure), Colitis, Colon cancer, COPD (chronic obstructive pulmonary disease), Decreased hearing, Diabetes mellitus, Diabetes mellitus, type 2, Hypercholesterolemia, Hypertension, Hypoxemia, Insomnia, Obesity, and Osteoarthritis.    Past Surgical History: Patient has a past surgical history that includes External ear surgery; Colectomy; Cholecystectomy; Flexible sigmoidoscopy (N/A, 9/19/2019); Eye surgery; Hysterectomy; Coronary " stent placement; Cardiac catheterization; Coronary angioplasty; and ANGIOGRAM, CORONARY, WITH LEFT HEART CATHETERIZATION (N/A, 2/10/2022).    Social History: Patient reports that she quit smoking about 16 years ago. Her smoking use included cigarettes. She started smoking about 57 years ago. She has a 150.00 pack-year smoking history. She has never used smokeless tobacco. She reports current alcohol use. She reports that she does not use drugs.    Family History: family history includes Febrile seizures in her mother; Heart failure in her father.    Allergies: Patient is allergic to iodinated contrast media and strawberries [strawberry].    ROS  Constitutional: Negative for fever   Eyes: Negative for blurred vision, double vision   Respiratory: Negative for cough, shortness of breath   Cardiovascular: Negative for chest pain, palpitations, and leg swelling.   Gastrointestinal: Negative for abdominal pain, constipation, diarrhea, nausea, vomiting.   Genitourinary:  + for dysuria, frequency   Musculoskeletal:  + generalized weakness. Negative for back pain and myalgias.   Skin: Negative for itching and rash.   Neurological: Negative for dizziness, headaches.   Psychiatric/Behavioral: Negative for depression. The patient is not nervous/anxious.      24 hour Vital Sign Range   Temp:  [97.9 °F (36.6 °C)-98.2 °F (36.8 °C)]   Pulse:  [57-66]   Resp:  [16-18]   BP: (137-156)/(60-65)   SpO2:  [96 %-97 %]     PEx  Constitutional: Patient appears debilitated.  No distress noted  HENT:   Head: Normocephalic and atraumatic.   Eyes: Pupils are equal, round  Neck: Normal range of motion. Neck supple.   Cardiovascular: Normal rate, regular rhythm and normal heart sounds.    Pulmonary/Chest: Effort normal and breath sounds are clear with diminished bases.  Supplemental oxygen in progress  Abdominal: Soft. Bowel sounds are normal.   Musculoskeletal: Normal range of motion.   Neurological: Alert and oriented to person, place, and  time.   Psychiatric: Normal mood and affect. Behavior is normal.   Skin: Skin is warm and dry.  PVD changes to bilateral lower extremities    Recent Labs   Lab 02/28/22  0508      K 4.6      CO2 23   BUN 67*   CREATININE 1.6*   MG 1.8       Recent Labs   Lab 02/28/22  0508   WBC 6.09   RBC 3.13*   HGB 8.9*   HCT 29.3*      MCV 94   MCH 28.4   MCHC 30.4*         Assessment and Plan:    UTI  - initiated.  Keflex 500 mg BID x7 days, will tailor antibiotic once culture and sensitivity has resulted    ISAS- new  CKD (chronic kidney disease), stage III  - Baseline sCr 1.5- 1.8   - likely caused from UTI, discontinuing lisinopril at [facility], repeat BMP tomorrow continue to monitor twice weekly BMPs, avoid nephrotoxic agents, renally dose medications when appropriate  -2/28- Improving, Cr 1.6 today from 2.6    NSTEMI (non-ST elevated myocardial infarction)  Coronary artery disease,  multivessel with history of previous PCI  - Recent hx of increasing episodes of angina requiring more frequent Nitroglycerin use and underwent LHC at OSH.   -Recent Left heart cath[02/11/22] showed:  · The RPAV lesion was 100% stenosed.  · The RPDA lesion was 100% stenosed.  · The Prox Cx to Mid Cx lesion was 80% stenosed.  · The 1st LPL lesion was 90% stenosed.  · The Prox RCA-2 lesion was 70% stenosed.  · The Prox RCA-1 lesion was 90% stenosed.  · The Mid LAD-2 lesion was 60% stenosed.  - continue 81 mg ASA qd, Atorvastatin 80mg qhs, Metoprolol 50 mg XL, Ticagrelor 90mg bid  - LHC deferred given rectus sheath hematoma. Plan for IC intervention 1-2 weeks after admission for outpatient.  Coronary atherosclerosis    Acute on chronic diastolic congestive heart failure  - TTE notable for Grade II left ventricular diastolic dysfunction, EF 65%  - continue Lasix 40 mg BID, lisinopril 10 mg daily  - Cardiac diet with Fluid restriction at 1.5L with strict I/Os and daily STANDING weights    Essential hypertension, benign  - home regimen  with amlodipine 5 mg daily, benazepril 40 mg daily  - continue amlodipine 10 mg daily, lisinopril 10 mg daily, metoprolol XL 50 mg daily  -2/28- BP stable     COPD/emphysema  - Known history of severe COPD (GOLD IV D PFT 2020). 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 Will obtain labs on 3/1 to monitor Cr     Dysuria  - UA with reflex ordered    Gastroesophageal reflux disease without esophagitis  - continue Protonix 40 mg daily    Hypercholesterolemia  - continue Atorvastatin 40mg    Obesity  - BMI currently 33.94  - RD following, weight loss encouraged    Rectus sheath hematoma  - Patient had severe abdominal pain the day before with a mass on the abdomen. Patient was unable to have a BM yesterday. KUB was drawn and showed a large stool burden. Continued bowel regimen but pain was persistent. CT abdomen without contrast and lactic drawn. CT abdomen showed rectal sheath hematoma extending to the pelvis. IR consulted and recommended CTA of the abdomen to identify bleeding vessel. Due to kidney function, held off on CTA for concerns of contrast induced nephropathy as patient had a Alfredo score of 16 with 100cc of contrast, 15 with 75 cc contrast. IR consulted and following. Hgb went from 13 on admission --> 10.6 --> 9.9 --> 8.9. hematoma was expanding. IR performed embolization of inferior epigastric and collateral vessels.   - continue to  monitor abdominal mass     Venous stasis  - Chronic history of venous stasis of bilateral lower extremities limited to breakdown of skin without varicose veins  - Patient denies increase in leg swelling over her baseline  - wound care RN following    Vitamin-D deficiency  - continue ergocalciferol 80760 units weekly    Debility   - Continue with PT/OT for gait training and strengthening and restoration of ADL's   - Encourage mobility, OOB in chair, and early ambulation as appropriate  - Fall precautions   - Monitor for bowel and bladder dysfunction  - Monitor for and prevent skin breakdown and pressure ulcers  - Continue DVT prophylaxis with ASA/ticagrelor, frequent ambulation         Anticipate disposition:  Home with home health      Follow-up needed during SNF stay- interventional cardiology (3/17)    Follow-up needed after discharge from SNF: PCP,     Future Appointments   Date Time Provider Department Center   3/17/2022 10:40 AM Scott Chappell MD [facility]   4/27/2022 11:20 AM Antonieta Marquez NP [facility]       Eliane Forde NP  Department of Hospital Medicine   [facility]     DOS: 2/28/2022       Patient note was created using MModal Dictation.  Any errors in syntax or even information may not have been identified and edited on initial review prior to signing this note.

## 2022-02-28 NOTE — PLAN OF CARE
Problem: Adult Inpatient Plan of Care  Goal: Plan of Care Review  Outcome: Ongoing, Progressing  Goal: Patient-Specific Goal (Individualized)  Outcome: Ongoing, Progressing  Goal: Absence of Hospital-Acquired Illness or Injury  Outcome: Ongoing, Progressing  Goal: Optimal Comfort and Wellbeing  Outcome: Ongoing, Progressing  Goal: Readiness for Transition of Care  Outcome: Ongoing, Progressing     Problem: Diabetes Comorbidity  Goal: Blood Glucose Level Within Targeted Range  Outcome: Ongoing, Progressing     Problem: Fall Injury Risk  Goal: Absence of Fall and Fall-Related Injury  Outcome: Ongoing, Progressing     Problem: Skin Injury Risk Increased  Goal: Skin Health and Integrity  Outcome: Ongoing, Progressing     Problem: Impaired Wound Healing  Goal: Optimal Wound Healing  Outcome: Ongoing, Progressing

## 2022-02-28 NOTE — PLAN OF CARE
Problem: Occupational Therapy Goal  Goal: Occupational Therapy Goal  Description: Goals to be met by: 3/8/22     Patient will increase functional independence with ADLs by performing:    UE Dressing with Modified [place].  LE Dressing with Supervision. Using hip kit to perform LBD.  Grooming while seated at sink with Modified [place]. MET  UPDATED: Grooming while standing at the sink with Supervision.   Toileting from toilet or BSC with Supervision for hygiene and clothing management.   Bathing from  sitting at sink with Minimal Assistance with (A) to wash distal LEs  Supine to sit with Modified [place].  Stand pivot transfers with Supervision with RW to steady.   Upper extremity exercise with UBE  for 10 minutes to increase functional endurance with supervision. MET  Caregiver will be educated on level of assist required to safely perform self care tasks and functional transfers..     Outcome: Ongoing, Progressing

## 2022-02-28 NOTE — PLAN OF CARE
Problem: Adult Inpatient Plan of Care  Goal: Plan of Care Review  Outcome: Ongoing, Progressing  Goal: Patient-Specific Goal (Individualized)  Outcome: Ongoing, Progressing  Flowsheets (Taken 2/28/2022 0048)  Anxieties, Fears or Concerns: being able to get through the night withought using a purwick  Individualized Care Needs: round frequently and answer call lights timely schedule toileting before patient sleeps  Patient-Specific Goals (Include Timeframe): patient will wean self off using purwick at night  Goal: Absence of Hospital-Acquired Illness or Injury  Outcome: Ongoing, Progressing  Intervention: Identify and Manage Fall Risk  Flowsheets (Taken 2/28/2022 0048)  Safety Promotion/Fall Prevention:   assistive device/personal item within reach   diversional activities provided   Fall Risk reviewed with patient/family   Fall Risk signage in place   lighting adjusted   medications reviewed   side rails raised x 2   instructed to call staff for mobility  Goal: Optimal Comfort and Wellbeing  Outcome: Ongoing, Progressing  Intervention: Provide Person-Centered Care  Flowsheets (Taken 2/28/2022 0048)  Trust Relationship/Rapport:   care explained   choices provided   emotional support provided   empathic listening provided   thoughts/feelings acknowledged   reassurance provided   questions encouraged   questions answered     Problem: Diabetes Comorbidity  Goal: Blood Glucose Level Within Targeted Range  Outcome: Ongoing, Progressing

## 2022-03-01 PROCEDURE — 11000004 HC SNF PRIVATE

## 2022-03-01 PROCEDURE — 94761 N-INVAS EAR/PLS OXIMETRY MLT: CPT

## 2022-03-01 PROCEDURE — 27000221 HC OXYGEN, UP TO 24 HOURS

## 2022-03-01 PROCEDURE — 25000242 PHARM REV CODE 250 ALT 637 W/ HCPCS: Performed by: NURSE PRACTITIONER

## 2022-03-01 PROCEDURE — 99900035 HC TECH TIME PER 15 MIN (STAT)

## 2022-03-01 PROCEDURE — 25000003 PHARM REV CODE 250: Performed by: NURSE PRACTITIONER

## 2022-03-01 PROCEDURE — 25000003 PHARM REV CODE 250: Performed by: HOSPITALIST

## 2022-03-01 RX ADMIN — TRIAMCINOLONE ACETONIDE: 1 CREAM TOPICAL at 09:03

## 2022-03-01 RX ADMIN — CEPHALEXIN 500 MG: 500 CAPSULE ORAL at 09:03

## 2022-03-01 RX ADMIN — Medication 6 MG: at 09:03

## 2022-03-01 RX ADMIN — TICAGRELOR 90 MG: 90 TABLET ORAL at 08:03

## 2022-03-01 RX ADMIN — ATORVASTATIN CALCIUM 80 MG: 20 TABLET, FILM COATED ORAL at 08:03

## 2022-03-01 RX ADMIN — AMLODIPINE BESYLATE 10 MG: 10 TABLET ORAL at 09:03

## 2022-03-01 RX ADMIN — TICAGRELOR 90 MG: 90 TABLET ORAL at 09:03

## 2022-03-01 RX ADMIN — CHOLESTYRAMINE 4 G: 4 POWDER, FOR SUSPENSION ORAL at 08:03

## 2022-03-01 RX ADMIN — CHOLESTYRAMINE 4 G: 4 POWDER, FOR SUSPENSION ORAL at 09:03

## 2022-03-01 RX ADMIN — PSYLLIUM HUSK 1 PACKET: 3.4 POWDER ORAL at 09:03

## 2022-03-01 RX ADMIN — TIOTROPIUM BROMIDE INHALATION SPRAY 2 PUFF: 3.12 SPRAY, METERED RESPIRATORY (INHALATION) at 09:03

## 2022-03-01 RX ADMIN — PANTOPRAZOLE SODIUM 40 MG: 40 TABLET, DELAYED RELEASE ORAL at 09:03

## 2022-03-01 RX ADMIN — ERGOCALCIFEROL 50000 UNITS: 1.25 CAPSULE ORAL at 09:03

## 2022-03-01 RX ADMIN — Medication 100 MG: at 08:03

## 2022-03-01 RX ADMIN — ONDANSETRON 8 MG: 8 TABLET, ORALLY DISINTEGRATING ORAL at 10:03

## 2022-03-01 RX ADMIN — FLUTICASONE FUROATE AND VILANTEROL TRIFENATATE 1 PUFF: 100; 25 POWDER RESPIRATORY (INHALATION) at 09:03

## 2022-03-01 RX ADMIN — Medication 1 CAPSULE: at 09:03

## 2022-03-01 RX ADMIN — ACETAMINOPHEN 650 MG: 325 TABLET ORAL at 09:03

## 2022-03-01 RX ADMIN — ASPIRIN 81 MG CHEWABLE TABLET 81 MG: 81 TABLET CHEWABLE at 09:03

## 2022-03-01 RX ADMIN — GABAPENTIN 100 MG: 100 CAPSULE ORAL at 08:03

## 2022-03-01 RX ADMIN — METOPROLOL SUCCINATE 50 MG: 50 TABLET, EXTENDED RELEASE ORAL at 09:03

## 2022-03-01 NOTE — PLAN OF CARE
Problem: Adult Inpatient Plan of Care  Goal: Plan of Care Review  Outcome: Ongoing, Progressing  Goal: Patient-Specific Goal (Individualized)  Outcome: Ongoing, Progressing  Goal: Absence of Hospital-Acquired Illness or Injury  Outcome: Ongoing, Progressing  Intervention: Prevent and Manage VTE (Venous Thromboembolism) Risk  Flowsheets (Taken 3/1/2022 0128)  Activity Management: Ambulated to bathroom - L4  VTE Prevention/Management:   bleeding risk assessed   dorsiflexion/plantar flexion performed   fluids promoted  Range of Motion: active ROM (range of motion) encouraged  Goal: Optimal Comfort and Wellbeing  Outcome: Ongoing, Progressing  Intervention: Provide Person-Centered Care  Flowsheets (Taken 3/1/2022 0128)  Trust Relationship/Rapport:   care explained   questions answered   choices provided   emotional support provided   questions encouraged   reassurance provided   empathic listening provided   thoughts/feelings acknowledged     Problem: Diabetes Comorbidity  Goal: Blood Glucose Level Within Targeted Range  Outcome: Ongoing, Progressing  Intervention: Monitor and Manage Glycemia  Flowsheets (Taken 3/1/2022 0128)  Glycemic Management: blood glucose monitored     Problem: Fall Injury Risk  Goal: Absence of Fall and Fall-Related Injury  Outcome: Ongoing, Progressing  Intervention: Promote Injury-Free Environment  Flowsheets (Taken 3/1/2022 0128)  Safety Promotion/Fall Prevention:   assistive device/personal item within reach   Fall Risk reviewed with patient/family   Fall Risk signage in place   diversional activities provided   in recliner, wheels locked   lighting adjusted   side rails raised x 2   instructed to call staff for mobility

## 2022-03-02 LAB
ANION GAP SERPL CALC-SCNC: 8 MMOL/L (ref 8–16)
BUN SERPL-MCNC: 44 MG/DL (ref 8–23)
CALCIUM SERPL-MCNC: 9 MG/DL (ref 8.7–10.5)
CHLORIDE SERPL-SCNC: 106 MMOL/L (ref 95–110)
CO2 SERPL-SCNC: 25 MMOL/L (ref 23–29)
CREAT SERPL-MCNC: 1.4 MG/DL (ref 0.5–1.4)
EST. GFR  (AFRICAN AMERICAN): 42.7 ML/MIN/1.73 M^2
EST. GFR  (NON AFRICAN AMERICAN): 37 ML/MIN/1.73 M^2
GLUCOSE SERPL-MCNC: 90 MG/DL (ref 70–110)
POTASSIUM SERPL-SCNC: 4.5 MMOL/L (ref 3.5–5.1)
SODIUM SERPL-SCNC: 139 MMOL/L (ref 136–145)

## 2022-03-02 PROCEDURE — 97116 GAIT TRAINING THERAPY: CPT | Mod: CQ

## 2022-03-02 PROCEDURE — 97110 THERAPEUTIC EXERCISES: CPT

## 2022-03-02 PROCEDURE — 25000003 PHARM REV CODE 250: Performed by: NURSE PRACTITIONER

## 2022-03-02 PROCEDURE — 27000221 HC OXYGEN, UP TO 24 HOURS

## 2022-03-02 PROCEDURE — 94761 N-INVAS EAR/PLS OXIMETRY MLT: CPT

## 2022-03-02 PROCEDURE — 36415 COLL VENOUS BLD VENIPUNCTURE: CPT | Performed by: HOSPITALIST

## 2022-03-02 PROCEDURE — 11000004 HC SNF PRIVATE

## 2022-03-02 PROCEDURE — 99900035 HC TECH TIME PER 15 MIN (STAT)

## 2022-03-02 PROCEDURE — 97530 THERAPEUTIC ACTIVITIES: CPT | Mod: CQ

## 2022-03-02 PROCEDURE — 25000003 PHARM REV CODE 250: Performed by: HOSPITALIST

## 2022-03-02 PROCEDURE — 97535 SELF CARE MNGMENT TRAINING: CPT

## 2022-03-02 PROCEDURE — 97530 THERAPEUTIC ACTIVITIES: CPT

## 2022-03-02 PROCEDURE — 25000242 PHARM REV CODE 250 ALT 637 W/ HCPCS: Performed by: NURSE PRACTITIONER

## 2022-03-02 PROCEDURE — 97110 THERAPEUTIC EXERCISES: CPT | Mod: CQ

## 2022-03-02 PROCEDURE — 80048 BASIC METABOLIC PNL TOTAL CA: CPT | Performed by: HOSPITALIST

## 2022-03-02 RX ADMIN — TIOTROPIUM BROMIDE INHALATION SPRAY 2 PUFF: 3.12 SPRAY, METERED RESPIRATORY (INHALATION) at 08:03

## 2022-03-02 RX ADMIN — AMLODIPINE BESYLATE 10 MG: 10 TABLET ORAL at 08:03

## 2022-03-02 RX ADMIN — Medication 100 MG: at 08:03

## 2022-03-02 RX ADMIN — CEPHALEXIN 500 MG: 500 CAPSULE ORAL at 09:03

## 2022-03-02 RX ADMIN — PANTOPRAZOLE SODIUM 40 MG: 40 TABLET, DELAYED RELEASE ORAL at 08:03

## 2022-03-02 RX ADMIN — Medication 6 MG: at 09:03

## 2022-03-02 RX ADMIN — CHOLESTYRAMINE 4 G: 4 POWDER, FOR SUSPENSION ORAL at 08:03

## 2022-03-02 RX ADMIN — Medication 1 CAPSULE: at 08:03

## 2022-03-02 RX ADMIN — FLUTICASONE FUROATE AND VILANTEROL TRIFENATATE 1 PUFF: 100; 25 POWDER RESPIRATORY (INHALATION) at 08:03

## 2022-03-02 RX ADMIN — ATORVASTATIN CALCIUM 80 MG: 20 TABLET, FILM COATED ORAL at 09:03

## 2022-03-02 RX ADMIN — PSYLLIUM HUSK 1 PACKET: 3.4 POWDER ORAL at 08:03

## 2022-03-02 RX ADMIN — TRIAMCINOLONE ACETONIDE: 1 CREAM TOPICAL at 09:03

## 2022-03-02 RX ADMIN — ASPIRIN 81 MG CHEWABLE TABLET 81 MG: 81 TABLET CHEWABLE at 08:03

## 2022-03-02 RX ADMIN — TICAGRELOR 90 MG: 90 TABLET ORAL at 08:03

## 2022-03-02 RX ADMIN — TICAGRELOR 90 MG: 90 TABLET ORAL at 09:03

## 2022-03-02 RX ADMIN — CHOLESTYRAMINE 4 G: 4 POWDER, FOR SUSPENSION ORAL at 09:03

## 2022-03-02 RX ADMIN — METOPROLOL SUCCINATE 50 MG: 50 TABLET, EXTENDED RELEASE ORAL at 08:03

## 2022-03-02 RX ADMIN — GABAPENTIN 100 MG: 100 CAPSULE ORAL at 09:03

## 2022-03-02 NOTE — PT/OT/SLP PROGRESS
"Physical Therapy Treatment    Patient Name:  Marisol Kerr   MRN:  [id number]  Admit Date: 2/21/2022  Admitting Diagnosis: NSTEMI (non-ST elevated myocardial infarction)  Recent Surgeries:     General Precautions: Standard, fall, hearing impaired   Orthopedic Precautions:N/A   Braces:       Recommendations:     Discharge Recommendations:  home health PT   Level of Assistance Recommended at Discharge: Intermittent assistance   Discharge Equipment Recommendations: none   Barriers to discharge: Decreased caregiver support    Assessment:     Marisol Kerr is a 76 y.o. female admitted with a medical diagnosis of NSTEMI (non-ST elevated myocardial infarction) . Pt tolerated well, pt would continue to benefit from skilled PT services to improve overall functional mobility, strength and endurance.  .      Performance deficits affecting function:  weakness, impaired endurance, impaired self care skills, impaired functional mobilty, gait instability, impaired balance, decreased lower extremity function, impaired cardiopulmonary response to activity .    Rehab Potential is good    Activity Tolerance: Fair    Plan:     Patient to be seen 6 x/week to address the above listed problems via gait training, therapeutic activities, therapeutic exercises, neuromuscular re-education, wheelchair management/training    · Plan of Care Expires: 03/08/22  · Plan of Care Reviewed with: patient    Subjective     "what we gonna do, don't want to go to far from the bathroom".     Pain/Comfort:  · Pain Rating 1: 0/10  · Pain Rating Post-Intervention 1: 0/10    Patient's cultural, spiritual, Caodaism conflicts given the current situation:  · no    Objective:       Patient found  with oxygen upon PT entry to room.     Therapeutic Activities and Exercises: mini elliptical x 10 min 2x10 reps AP,LAQ  Ed/discussed FT and DME plan to discuss with daughter tomorrow before ordering (RW/SC?)    Functional Mobility:  · Transfers:     · Sit to Stand:  " supervision with rolling walker  · Gait: amb with RW 02/wc in tow ~ 110 ft no LOB occ vcs for getting cloer to RW/erect posture SBA    AM-PAC 6 CLICK MOBILITY  17    Patient left up in chair with all lines intact, call button in reach and belonging sin reach.    GOALS:   Multidisciplinary Problems     Physical Therapy Goals        Problem: Physical Therapy Goal    Goal Priority Disciplines Outcome Goal Variances Interventions   Physical Therapy Goal     PT, PT/OT Ongoing, Progressing     Description: Goals to be met by: 3/8/22    Patient will increase functional independence with mobility by performing:    . Supine to sit with [place]  . Sit to supine with [place]  . Rolling to Left and Right with [place].  . Sit to stand transfer with Supervision  . Bed to chair transfer with Supervision using Rolling Walker  . Gait  x 100 feet with Supervision using Rolling Walker.   . Wheelchair propulsion x50 feet with Supervision using bilateral uppper extremities  . Ascend/Descend 4 inch curb step with Contact Guard Assistance using Rolling Walker.  . Retrieve object off floor with RW and reacher and SBA.                     Time Tracking:     PT Received On: 03/02/22  PT Total Time (min):       Billable Minutes: Gait Training 15, Therapeutic Activity 13 and Therapeutic Exercise 10    Treatment Type: Treatment  PT/PTA: PTA     PTA Visit Number: 2     03/02/2022

## 2022-03-02 NOTE — PT/OT/SLP PROGRESS
Occupational Therapy   Treatment    Name: Marisol Kerr  MRN: [id number]  Admit Date: 2/21/2022  Admitting Diagnosis:  NSTEMI (non-ST elevated myocardial infarction)    General Precautions: Standard, fall   Orthopedic Precautions:N/A   Braces: N/A     Recommendations:     Discharge Recommendations: home health OT  Level of Assistance Recommended at Discharge: Intermittent assistance for ADL's and homemaking tasks  Discharge Equipment Recommendations:  none (Pt has BSC, shower chair, grab bars, a RW, and a transport chair.)  Barriers to discharge:  Decreased caregiver support    Assessment:     Marisol Kerr is a 76 y.o. female with a medical diagnosis of NSTEMI (non-ST elevated myocardial infarction). Performance deficits affecting function are weakness, impaired endurance, impaired self care skills, impaired functional mobilty, gait instability, impaired balance, decreased coordination, decreased lower extremity function, decreased ROM, impaired skin, impaired cardiopulmonary response to activity. Patient tolerated treatment well and without incident, but continues to be limited secondary to respiratory deficits and generalized weakness/fatigue. Patient requires Supervision/SBA for STS with RW and SBA for LB dressing performed in standing. Pt to continue OT POC to increase independence ans safety needed for the performance of ADLs.     Rehab Potential is good    Activity tolerance:  Good    Plan:     Patient to be seen 6 x/week to address the above listed problems via self-care/home management, therapeutic activities, therapeutic exercises    · Plan of Care Expires: 03/22/22  · Plan of Care Reviewed with: patient    Subjective     Communicated with: nurse prior to session.     Pain/Comfort:  · Pain Rating 1: 0/10  · Pain Rating Post-Intervention 1: 0/10    Patient's cultural, spiritual, Scientology conflicts given the current situation:  · no (Yazdanism)    Objective:     Patient found up in chair with oxygen upon OT entry  to room.    Bed Mobility:    · Pt seated in bedside chair at onset of therapy session.    Functional Mobility/Transfers:  · Patient completed Sit <> Stand Transfer with supervision and stand by assistance with  rolling walker   · Functional Mobility: Pt self-propelled W/C ~160 ft from room to therapy gym with SBA.     Activities of Daily Living:  · Upper Body Dressing: modified independence for pt to beth/doff pullover shirt  · Lower Body Dressing: supervision for pt to thread feet into pant legs and SBA for pt to pull pants over hips when in standing.     [facility] 6 Click ADL: 22    OT Exercises: UE Ergometer performed for 15 min at mod resistance to increase strength and endurance needed for ADLs    Pt worked on functional standing activity consisting of standing with RW while reaching in all planes , crossing of midline and reaching to varying heights to facilitate (B) wt shifting and stability in standing in prep for performance of self care tasks and functional ADLS in standing.  Pt tolerated up to 8 Min. in standing with  SBA and RW to steady.      Treatment & Education:  Pt edu on POC, safety when performing self care tasks, and safety when performing functional transfers and mobility.  - White board updated  - Self care tasks completed-- as noted above       Patient left up in chair with all lines intact and call button in reachEducation:      GOALS:   Multidisciplinary Problems     Occupational Therapy Goals        Problem: Occupational Therapy Goal    Goal Priority Disciplines Outcome Interventions   Occupational Therapy Goal     OT, PT/OT Ongoing, Progressing    Description: Goals to be met by: 3/8/22     Patient will increase functional independence with ADLs by performing:    UE Dressing with Modified [place]. MET  LE Dressing with Supervision. Using hip kit to perform LBD.   Grooming while seated at sink with Modified [place]. MET  UPDATED: Grooming while standing at the sink with Supervision.    Toileting from toilet or BSC with Supervision for hygiene and clothing management.   Bathing from  sitting at sink with Minimal Assistance with (A) to wash distal LEs  Supine to sit with Modified [place].  Stand pivot transfers with Supervision with RW to steady.   Upper extremity exercise with UBE  for 10 minutes to increase functional endurance with supervision. MET  Caregiver will be educated on level of assist required to safely perform self care tasks and functional transfers..                      Time Tracking:     OT Date of Treatment: OT Date of Treatment: 03/02/22  OT Total Time (min): Total Time (min): 54 min    Billable Minutes:Self Care/Home Management 15  Therapeutic Activity 14  Therapeutic Exercise 25    3/2/2022

## 2022-03-02 NOTE — PLAN OF CARE
Problem: Adult Inpatient Plan of Care  Goal: Plan of Care Review  Outcome: Ongoing, Progressing   Recommendations     1. Continue Cardiac diet.  2. Add Boost Glucose Control daily.     Goals: 1. Pt's intake meals >75% by RD follow up.  Nutrition Goal Status: goal met  Communication of RD Recs:  (POC)     Assessment and Plan      Nutrition Problem  Nutrition-related knowledge deficit     Related to (etiology):   Heart healthy diet     Signs and Symptoms (as evidenced by):   Pt is trying to follow heart healthy diet at home, had a number of questions about her diet.     Interventions(treatment strategy):  Nutrition education: low sodium diet  Sodium controlled diet     Nutrition Diagnosis Status:   Continues

## 2022-03-02 NOTE — PLAN OF CARE
Problem: Adult Inpatient Plan of Care  Goal: Plan of Care Review  Outcome: Ongoing, Progressing  Goal: Patient-Specific Goal (Individualized)  Outcome: Ongoing, Progressing  Goal: Absence of Hospital-Acquired Illness or Injury  Outcome: Ongoing, Progressing  Goal: Optimal Comfort and Wellbeing  Outcome: Ongoing, Progressing  Goal: Readiness for Transition of Care  Outcome: Ongoing, Progressing     Problem: Diabetes Comorbidity  Goal: Blood Glucose Level Within Targeted Range  Outcome: Ongoing, Progressing     Problem: Fall Injury Risk  Goal: Absence of Fall and Fall-Related Injury  Outcome: Ongoing, Progressing     Problem: Skin Injury Risk Increased  Goal: Skin Health and Integrity  Outcome: Ongoing, Progressing     Problem: Impaired Wound Healing  Goal: Optimal Wound Healing  Outcome: Ongoing, Progressing     Problem: Respiratory Compromise (Heart Failure)  Goal: Effective Oxygenation and Ventilation  Outcome: Ongoing, Progressing

## 2022-03-02 NOTE — PLAN OF CARE
Lab drawn at midnight, patient tolerated well.     Problem: Adult Inpatient Plan of Care  Goal: Plan of Care Review  Outcome: Ongoing, Progressing  Flowsheets (Taken 3/2/2022 0038)  Plan of Care Reviewed With: patient  Goal: Patient-Specific Goal (Individualized)  Outcome: Ongoing, Progressing  Goal: Absence of Hospital-Acquired Illness or Injury  Outcome: Ongoing, Progressing  Intervention: Prevent Skin Injury  Flowsheets (Taken 3/2/2022 0038)  Body Position:   position changed independently   side-lying   left  Skin Protection:   adhesive use limited   incontinence pads utilized  Goal: Optimal Comfort and Wellbeing  Outcome: Ongoing, Progressing  Intervention: Provide Person-Centered Care  Flowsheets (Taken 3/2/2022 0038)  Trust Relationship/Rapport:   questions answered   care explained   choices provided   questions encouraged   emotional support provided   reassurance provided   empathic listening provided   thoughts/feelings acknowledged     Problem: Diabetes Comorbidity  Goal: Blood Glucose Level Within Targeted Range  Outcome: Ongoing, Progressing  Intervention: Monitor and Manage Glycemia  Flowsheets (Taken 3/2/2022 0038)  Glycemic Management: blood glucose monitored     Problem: Fall Injury Risk  Goal: Absence of Fall and Fall-Related Injury  Outcome: Ongoing, Progressing  Intervention: Identify and Manage Contributors  Flowsheets (Taken 3/2/2022 0038)  Self-Care Promotion:   independence encouraged   BADL personal objects within reach   BADL personal routines maintained   safe use of adaptive equipment encouraged   meal set-up provided  Medication Review/Management: medications reviewed

## 2022-03-02 NOTE — PLAN OF CARE
Problem: Occupational Therapy Goal  Goal: Occupational Therapy Goal  Description: Goals to be met by: 3/8/22     Patient will increase functional independence with ADLs by performing:    UE Dressing with Modified [place]. MET  LE Dressing with Supervision. Using hip kit to perform LBD.   Grooming while seated at sink with Modified [place]. MET  UPDATED: Grooming while standing at the sink with Supervision.   Toileting from toilet or BSC with Supervision for hygiene and clothing management.   Bathing from  sitting at sink with Minimal Assistance with (A) to wash distal LEs  Supine to sit with Modified [place].  Stand pivot transfers with Supervision with RW to steady.   Upper extremity exercise with UBE  for 10 minutes to increase functional endurance with supervision. MET  Caregiver will be educated on level of assist required to safely perform self care tasks and functional transfers..     Outcome: Ongoing, Progressing

## 2022-03-02 NOTE — PROGRESS NOTES
[facility] ([place] - Skilled Nursing  Adult Nutrition  Progress Note    SUMMARY       Recommendations    1. Continue Cardiac diet.  2. Add Boost Glucose Control daily.    Goals: 1. Pt's intake meals >75% by RD follow up.  Nutrition Goal Status: goal met  Communication of RD Recs:  (POC)    Assessment and Plan     Nutrition Problem  Nutrition-related knowledge deficit     Related to (etiology):   Heart healthy diet     Signs and Symptoms (as evidenced by):   Pt is trying to follow heart healthy diet at home, had a number of questions about her diet.     Interventions(treatment strategy):  Nutrition education: low sodium diet  Sodium controlled diet     Nutrition Diagnosis Status:   Continues    Malnutrition Assessment 2/24/22                 Orbital Region (Subcutaneous Fat Loss): well nourished  Upper Arm Region (Subcutaneous Fat Loss): well nourished  Thoracic and Lumbar Region: well nourished   Stendal Region (Muscle Loss): well nourished  Clavicle Bone Region (Muscle Loss): well nourished  Clavicle and Acromion Bone Region (Muscle Loss): well nourished  Scapular Bone Region (Muscle Loss): well nourished  Dorsal Hand (Muscle Loss): well nourished  Patellar Region (Muscle Loss): well nourished  Anterior Thigh Region (Muscle Loss): well nourished  Posterior Calf Region (Muscle Loss): well nourished   Edema (Fluid Accumulation): 1-->trace (calves)             Reason for Assessment    Reason For Assessment: RD follow-up  Diagnosis: cardiac disease (NSTEMI)  Relevant Medical History: COPD, DM2, HTN, CAD, Colon Ca  Interdisciplinary Rounds: attended  General Information Comments: Pt with % intake meals over last week. Chart shows wt loss, may be discrepancy as pt was 181 lbs 2/27; however, pt endorses fluid loss and says she has not been eating as much as she normally does. Pt well nourished per NFPE 2/22, educated previously on low sodium diet.  Nutrition Discharge Planning: Cardiac healthy, carbohydrate consistent  "diet    Nutrition Risk Screen    Nutrition Risk Screen: no indicators present    Nutrition/Diet History    Patient Reported Diet/Restrictions/Preferences: heart healthy  Typical Food/Fluid Intake: pt makes all her own meals and tries to eat heart healthy.  Food Preferences: NO FISH  Spiritual, Cultural Beliefs, Spiritism Practices, Values that Affect Care: no (Tenriism)  Food Allergies:  (strawberry)    Anthropometrics    Temp: 98.1 °F (36.7 °C)  Height Method: Stated  Height: 5' 3" (160 cm)  Height (inches): 63 in  Weight Method: Bed Scale  Weight: 75.2 kg (165 lb 12.6 oz)  Weight (lb): 165.79 lb  Ideal Body Weight (IBW), Female: 115 lb  % Ideal Body Weight, Female (lb): 161.99 %  BMI (Calculated): 29.4       Lab/Procedures/Meds    Pertinent Labs Reviewed: reviewed  Pertinent Labs Comments: H/H 8.9/29.3, BUN 44, eGFR 37, Alb 2.7, A1c 5.2% (1/27/22)  Pertinent Medications Reviewed: reviewed  Pertinent Medications Comments: psyllium husk, coenzyme Q10, vitamin D, gabapentin, protonix    Estimated/Assessed Needs    Weight Used For Calorie Calculations: 84.5 kg (186 lb 4.6 oz)  Energy Calorie Requirements (kcal): 1445 kcal  Energy Need Method: [place] Errolor (PAL 1.10)  Protein Requirements: 76-93g  Weight Used For Protein Calculations: 84.5 kg (186 lb 4.6 oz)        RDA Method (mL): 1445  CHO Requirement: 181g      Nutrition Prescription Ordered    Current Diet Order: Cardiac    Evaluation of Received Nutrient/Fluid Intake    Comments: last BM 3/01  % Intake of Estimated Energy Needs: 75 - 100 %  % Meal Intake: 75 - 100 %    Nutrition Risk    Level of Risk/Frequency of Follow-up: low     Monitor and Evaluation    Food and Nutrient Intake: energy intake, food and beverage intake  Food and Nutrient Adminstration: diet order  Knowledge/Beliefs/Attitudes: food and nutrition knowledge/skill  Anthropometric Measurements: weight, weight change  Biochemical Data, Medical Tests and Procedures: electrolyte and renal panel, " gastrointestinal profile, glucose/endocrine profile, inflammatory profile, lipid profile  Nutrition-Focused Physical Findings: overall appearance, extremities, muscles and bones, head and eyes, skin     Nutrition Follow-Up    RD Follow-up?: Yes

## 2022-03-03 ENCOUNTER — LAB VISIT (OUTPATIENT)
Dept: LAB | Facility: OTHER | Age: 77
End: 2022-03-03
Payer: MEDICARE

## 2022-03-03 DIAGNOSIS — Z20.822 ENCOUNTER FOR LABORATORY TESTING FOR COVID-19 VIRUS: ICD-10-CM

## 2022-03-03 LAB
POCT GLUCOSE: 119 MG/DL (ref 70–110)
POCT GLUCOSE: 158 MG/DL (ref 70–110)
POCT GLUCOSE: 95 MG/DL (ref 70–110)
POCT GLUCOSE: 98 MG/DL (ref 70–110)
SARS-COV-2 RNA RESP QL NAA+PROBE: NOT DETECTED
SARS-COV-2- CYCLE NUMBER: NORMAL

## 2022-03-03 PROCEDURE — 97116 GAIT TRAINING THERAPY: CPT

## 2022-03-03 PROCEDURE — 25000242 PHARM REV CODE 250 ALT 637 W/ HCPCS: Performed by: NURSE PRACTITIONER

## 2022-03-03 PROCEDURE — 25000003 PHARM REV CODE 250: Performed by: HOSPITALIST

## 2022-03-03 PROCEDURE — 25000003 PHARM REV CODE 250: Performed by: NURSE PRACTITIONER

## 2022-03-03 PROCEDURE — 97535 SELF CARE MNGMENT TRAINING: CPT | Mod: CO

## 2022-03-03 PROCEDURE — 27000221 HC OXYGEN, UP TO 24 HOURS

## 2022-03-03 PROCEDURE — 99900035 HC TECH TIME PER 15 MIN (STAT)

## 2022-03-03 PROCEDURE — U0003 INFECTIOUS AGENT DETECTION BY NUCLEIC ACID (DNA OR RNA); SEVERE ACUTE RESPIRATORY SYNDROME CORONAVIRUS 2 (SARS-COV-2) (CORONAVIRUS DISEASE [COVID-19]), AMPLIFIED PROBE TECHNIQUE, MAKING USE OF HIGH THROUGHPUT TECHNOLOGIES AS DESCRIBED BY CMS-2020-01-R: HCPCS | Performed by: EMERGENCY MEDICINE

## 2022-03-03 PROCEDURE — 11000004 HC SNF PRIVATE

## 2022-03-03 PROCEDURE — 94761 N-INVAS EAR/PLS OXIMETRY MLT: CPT

## 2022-03-03 RX ADMIN — TIOTROPIUM BROMIDE INHALATION SPRAY 2 PUFF: 3.12 SPRAY, METERED RESPIRATORY (INHALATION) at 09:03

## 2022-03-03 RX ADMIN — CHOLESTYRAMINE 4 G: 4 POWDER, FOR SUSPENSION ORAL at 09:03

## 2022-03-03 RX ADMIN — METOPROLOL SUCCINATE 50 MG: 50 TABLET, EXTENDED RELEASE ORAL at 09:03

## 2022-03-03 RX ADMIN — ATORVASTATIN CALCIUM 80 MG: 20 TABLET, FILM COATED ORAL at 09:03

## 2022-03-03 RX ADMIN — TICAGRELOR 90 MG: 90 TABLET ORAL at 09:03

## 2022-03-03 RX ADMIN — Medication 100 MG: at 08:03

## 2022-03-03 RX ADMIN — ASPIRIN 81 MG CHEWABLE TABLET 81 MG: 81 TABLET CHEWABLE at 09:03

## 2022-03-03 RX ADMIN — PANTOPRAZOLE SODIUM 40 MG: 40 TABLET, DELAYED RELEASE ORAL at 09:03

## 2022-03-03 RX ADMIN — GABAPENTIN 100 MG: 100 CAPSULE ORAL at 09:03

## 2022-03-03 RX ADMIN — TRIAMCINOLONE ACETONIDE: 1 CREAM TOPICAL at 09:03

## 2022-03-03 RX ADMIN — AMLODIPINE BESYLATE 10 MG: 10 TABLET ORAL at 09:03

## 2022-03-03 RX ADMIN — PSYLLIUM HUSK 1 PACKET: 3.4 POWDER ORAL at 09:03

## 2022-03-03 RX ADMIN — Medication 1 CAPSULE: at 09:03

## 2022-03-03 RX ADMIN — FLUTICASONE FUROATE AND VILANTEROL TRIFENATATE 1 PUFF: 100; 25 POWDER RESPIRATORY (INHALATION) at 09:03

## 2022-03-03 NOTE — PLAN OF CARE
Problem: Occupational Therapy Goal  Goal: Occupational Therapy Goal  Description: Goals to be met by: 3/8/22     Patient will increase functional independence with ADLs by performing:    UE Dressing with Modified [place]. MET  LE Dressing with Supervision. Using hip kit to perform LBD. =MET  Grooming while seated at sink with Modified [place]. MET  UPDATED: Grooming while standing at the sink with Supervision.   Toileting from toilet or BSC with Supervision for hygiene and clothing management.   Bathing from  sitting at sink with Minimal Assistance with (A) to wash distal LEs  Supine to sit with Modified [place].  Stand pivot transfers with Supervision with RW to steady.   Upper extremity exercise with UBE  for 10 minutes to increase functional endurance with supervision. MET  Caregiver will be educated on level of assist required to safely perform self care tasks and functional transfers..     Outcome: Ongoing, Progressing

## 2022-03-03 NOTE — PT/OT/SLP PROGRESS
"Physical Therapy Treatment/family training    Patient Name:  Marisol Kerr   MRN:  [id number]  Admit Date: 2/21/2022  Admitting Diagnosis: NSTEMI (non-ST elevated myocardial infarction)  General Precautions: Standard, fall   Orthopedic Precautions:N/A   Braces: N/A     Recommendations:     Discharge Recommendations:  home health PT   Level of Assistance Recommended at Discharge: Intermittent assistance   Discharge Equipment Recommendations: none   Barriers to discharge: Decreased caregiver support    Assessment:     Marisol Kerr is a 76 y.o. female admitted with a medical diagnosis of NSTEMI (non-ST elevated myocardial infarction) . Pt requires supervision for transfers, CGA for up/down 4" step,  and SBA for gait due to manage oxygen. Pt is motivated to progress with functional mobility.    Performance deficits affecting function:  impaired endurance, weakness, impaired functional mobilty, gait instability, impaired balance, impaired cardiopulmonary response to activity .    Rehab Potential is good    Activity Tolerance: Good    Plan:     Patient to be seen 6 x/week to address the above listed problems via gait training, therapeutic activities, therapeutic exercises, neuromuscular re-education, wheelchair management/training    · Plan of Care Expires: 03/08/22  · Plan of Care Reviewed with: patient, daughter    Subjective   "I can lift my leg up to get up a step, I couldn't do that before I got sick".     Pain/Comfort:  · Pain Rating 1: 0/10  · Pain Rating Post-Intervention 1: 0/10    Patient's cultural, spiritual, Nondenominational conflicts given the current situation:  · no    Objective:     Communicated with nurse prior to session.  Patient found up in chair with oxygen upon PT entry to room.     Therapeutic Activities and Exercises: PT educated pt and her daughter in safety with mobility upon D/C. PT answered pt and her daughter's questions within the PT scope of practice.     Functional Mobility:  · Transfers:     · Sit " "to Stand:  supervision with rolling walker  · Gait: 110ft then 30ft with RW with SBA to manage oxygen. pt performed gait with flexed trunk, required verbal cues to step closer to the walker and for upright posture at times. pt educated to step all the way back to the w/c with the RW and to reach for the armrest prior to sitting. She expressed understanding and improved with 2nd trial  · Stairs:  Pt ascended/descended 4" curb step with Rolling Walker with no handrails with Contact Guard Assistance.  pt and her daughter educated in proper sequencing , they expressed understanding.    AM-PAC 6 CLICK MOBILITY  18    Patient left up in chair with all lines intact, call button in reach, nurse notified and daughter present.    GOALS:   Multidisciplinary Problems     Physical Therapy Goals        Problem: Physical Therapy Goal    Goal Priority Disciplines Outcome Goal Variances Interventions   Physical Therapy Goal     PT, PT/OT Ongoing, Progressing     Description: Goals to be met by: 3/8/22    Patient will increase functional independence with mobility by performing:    . Supine to sit with [place]  . Sit to supine with [place]  . Rolling to Left and Right with [place].  . Sit to stand transfer with Supervision-met 3/3/22  . Bed to chair transfer with Supervision using Rolling Walker  . Gait  x 100 feet with Supervision using Rolling Walker.   . Wheelchair propulsion x50 feet with Supervision using bilateral uppper extremities  . Ascend/Descend 4 inch curb step with Contact Guard Assistance using Rolling Walker.-met 3/3/22  . Retrieve object off floor with RW and reacher and SBA.                     Time Tracking:     PT Received On: 03/03/22  PT Total Time (min):   33    Billable Minutes: Gait Training 33    Treatment Type: Treatment, Family Training  PT/PTA: PT     PTA Visit Number: 0     03/03/2022  "

## 2022-03-03 NOTE — PLAN OF CARE
[facility], [place] - Skilled Nursing      HOME HEALTH ORDERS  FACE TO FACE ENCOUNTER    Patient Name: Marisol Kerr  YOB: 1945    PCP: Khadar Dwyer MD   PCP Address: [address] [facility] / [place]*  PCP Phone Number: [phone]  PCP Fax: [phone]    Encounter Date: 2/21/22    Admit to Home Health    Diagnoses:  Active Hospital Problems    Diagnosis  POA    *NSTEMI (non-ST elevated myocardial infarction) [I21.4]  Yes    Rectus sheath hematoma [S30.1XXA]  Yes    Acute on chronic respiratory failure [J96.20]  Yes    Acute on chronic diastolic congestive heart failure [I50.33]  Yes    Chronic hypoxemic respiratory failure [J96.11]  Yes     3 L at home      Chronic respiratory failure [J96.10]  Yes    CKD (chronic kidney disease), stage III [N18.30]  Yes    Diabetic peripheral vascular disease [E11.51]  Yes    Coronary artery disease, multivessel with history of previous PCI [I25.10]  Yes    DM type 2, controlled, with complication [E11.8]  Yes      Resolved Hospital Problems   No resolved problems to display.       Follow Up Appointments:  Future Appointments   Date Time Provider Department Center   3/17/2022 10:40 AM Scott Chappell MD [facility]   4/27/2022 11:20 AM Antonieta Marquez NP [facility]       Allergies:  Review of patient's allergies indicates:   Allergen Reactions    Iodinated contrast media     Strawberries [strawberry] Hives and Itching       Medications: Review discharge medications with patient and family and provide education.      I have seen and examined this patient within the last 30 days. My clinical findings that support the need for the home health skilled services and home bound status are the following:no   Weakness/numbness causing balance and gait disturbance due to NSTEMI making it taxing to leave home.     Referrals/ Consults  Physical Therapy to evaluate and treat. Evaluate for home safety and  equipment needs; Establish/upgrade home exercise program. Perform / instruct on therapeutic exercises, gait training, transfer training, and Range of Motion.  Occupational Therapy to evaluate and treat. Evaluate home environment for safety and equipment needs. Perform/Instruct on transfers, ADL training, ROM, and therapeutic exercises.   to evaluate for community resources/long-range planning.  Aide to provide assistance with personal care, ADLs, and vital signs.    Activities:   activity as tolerated    Nursing:   Agency to admit patient within 24 hours of hospital discharge unless specified on physician order or at patient request    SN to complete comprehensive assessment including routine vital signs. Instruct on disease process and s/s of complications to report to MD. Review/verify medication list sent home with the patient at time of discharge  and instruct patient/caregiver as needed. Frequency may be adjusted depending on start of care date.     Skilled nurse to perform up to 3 visits PRN for symptoms related to diagnosis    Notify MD if SBP > 160 or < 90; DBP > 90 or < 50; HR > 120 or < 50; Temp > 101; O2 < 88%    Ok to schedule additional visits based on staff availability and patient request on consecutive days within the home health episode.    Miscellaneous   Diabetic Care:   SN to perform and educate Diabetic management with blood glucose monitoring:, Fingerstick blood sugar AC and HS and Report CBG < 60 or > 350 to physician.  Home Oxygen:  No change    Home Health Aide:  Nursing Three times weekly, Physical Therapy Three times weekly, Occupational Therapy Three times weekly, Medical Social Work Three times weekly and Home Health Aide Three times weekly    Wound Care Orders  no    I certify that this patient is confined to her home and needs intermittent skilled nursing care, physical therapy and occupational therapy.

## 2022-03-03 NOTE — TREATMENT PLAN
[facility]' DME recommendations    Marisol Kerr  MRN: [id number]    [x]  No DME needed    [x] Home health PT and OT    LONA Campbell 3/3/2022

## 2022-03-03 NOTE — PT/OT/SLP PROGRESS
"Occupational Therapy   Treatment    Name: Marisol Kerr  MRN: [id number]  Admit Date: 2/21/2022  Admitting Diagnosis:  NSTEMI (non-ST elevated myocardial infarction)    General Precautions: Standard, fall   Orthopedic Precautions:N/A   Braces:       Recommendations:     Discharge Recommendations: home health OT  Level of Assistance Recommended at Discharge: Intermittent assistance for ADL's and homemaking tasks  Discharge Equipment Recommendations:  none (Pt has BSC, shower chair, grab bars, a RW, and a transport chair.)  Barriers to discharge:  Decreased caregiver support    Assessment:     Marisol Kerr is a 76 y.o. female with a medical diagnosis of NSTEMI (non-ST elevated myocardial infarction) .Performance deficits affecting function are weakness, impaired endurance, impaired self care skills, impaired functional mobilty, gait instability, impaired balance, decreased coordination, decreased lower extremity function, decreased ROM, impaired skin, impaired cardiopulmonary response to activity. Pt. participated well with session on this day. Pt is progressing well with session on this day still continues to requires cues with aspects of safety . Pt. Will continue to benefit from continued OT to progress towards goals    Rehab Potential is good    Activity tolerance:  Good    Plan:     Patient to be seen 6 x/week to address the above listed problems via self-care/home management, therapeutic activities, therapeutic exercises    · Plan of Care Expires: 03/22/22  · Plan of Care Reviewed with: patient    Subjective     Communicated with: latasha prior to session. "This is my daughter"    Pain/Comfort:  Pain Rating 1: 0/10  Pain Rating Post-Intervention 1: 0/10    Patient's cultural, spiritual, Mandaen conflicts given the current situation:  no (Yazidism)    Objective:     Patient found up in chair with oxygen upon OT entry to room.    Bed Mobility:    · Patient completed Supine to Sit with stand by assistance  · Patient " completed Sit to Supine with minimum assistance with BLE management      Functional Mobility/Transfers:  · Patient completed Sit <> Stand Transfer with supervision  with  rolling walker   · Patient completed Bed <> Chair Transfer using Step Transfer technique with stand by assistance with rolling walker  · Patient completed Toilet Transfer Step Transfer technique with supervision with  rolling walker  · Functional Mobility: Pt. With fxl mobility throughout room and bathroom with RW and SBA/S    Activities of Daily Living:  · Grooming: supervision with hand hygiene instance at sink level   · Upper Body Dressing: modified independence to doff gown and beth pullover shirt   · Lower Body Dressing: supervision to beth BLE socks with hip kit and pants over hips instance  · Toileting: minimum assistance with diaper management     [facility] 6 Click ADL: 22    Treatment & Education:  Pt. With family training held on this day with daughter with reviewed t/f's, selfcare skills and DME and level of (A) for home upon d/c.    Discussion of DME occurred during session     Patient left up in chair with all lines intact and call button in reachEducation:      GOALS:   Multidisciplinary Problems     Occupational Therapy Goals        Problem: Occupational Therapy Goal    Goal Priority Disciplines Outcome Interventions   Occupational Therapy Goal     OT, PT/OT Ongoing, Progressing    Description: Goals to be met by: 3/8/22     Patient will increase functional independence with ADLs by performing:    UE Dressing with Modified [place]. MET  LE Dressing with Supervision. Using hip kit to perform LBD.   Grooming while seated at sink with Modified [place]. MET  UPDATED: Grooming while standing at the sink with Supervision.   Toileting from toilet or BSC with Supervision for hygiene and clothing management.   Bathing from  sitting at sink with Minimal Assistance with (A) to wash distal LEs  Supine to sit with Modified  [place].  Stand pivot transfers with Supervision with RW to steady.   Upper extremity exercise with UBE  for 10 minutes to increase functional endurance with supervision. MET  Caregiver will be educated on level of assist required to safely perform self care tasks and functional transfers..                      Time Tracking:     OT Date of Treatment: OT Date of Treatment: 03/03/22  OT Total Time (min): Total Time (min): 38 min    Billable Minutes:Self Care/Home Management 38    3/3/2022   OT and TYLER have discussed the above patients goals and status in collaboration with Plan of Care.

## 2022-03-03 NOTE — PLAN OF CARE
Problem: Adult Inpatient Plan of Care  Goal: Plan of Care Review  3/3/2022 1607 by Shakila Koch LPN  Outcome: Ongoing, Progressing  3/3/2022 1606 by Shakila Koch LPN  Outcome: Ongoing, Progressing  Goal: Patient-Specific Goal (Individualized)  Outcome: Ongoing, Progressing  Goal: Absence of Hospital-Acquired Illness or Injury  Outcome: Ongoing, Progressing  Goal: Optimal Comfort and Wellbeing  Outcome: Ongoing, Progressing  Goal: Readiness for Transition of Care  Outcome: Ongoing, Progressing     Problem: Diabetes Comorbidity  Goal: Blood Glucose Level Within Targeted Range  Outcome: Ongoing, Progressing     Problem: Fall Injury Risk  Goal: Absence of Fall and Fall-Related Injury  Outcome: Ongoing, Progressing     Problem: Skin Injury Risk Increased  Goal: Skin Health and Integrity  Outcome: Ongoing, Progressing     Problem: Impaired Wound Healing  Goal: Optimal Wound Healing  Outcome: Ongoing, Progressing     Problem: Respiratory Compromise (Heart Failure)  Goal: Effective Oxygenation and Ventilation  Outcome: Ongoing, Progressing

## 2022-03-03 NOTE — PLAN OF CARE
Problem: Physical Therapy Goal  Goal: Physical Therapy Goal  Description: Goals to be met by: 3/8/22    Patient will increase functional independence with mobility by performing:    . Supine to sit with [place]  . Sit to supine with [place]  . Rolling to Left and Right with [place].  . Sit to stand transfer with Supervision-met 3/3/22  . Bed to chair transfer with Supervision using Rolling Walker  . Gait  x 100 feet with Supervision using Rolling Walker.   . Wheelchair propulsion x50 feet with Supervision using bilateral uppper extremities  . Ascend/Descend 4 inch curb step with Contact Guard Assistance using Rolling Walker.-met 3/3/22  . Retrieve object off floor with RW and reacher and SBA.    Outcome: Ongoing, Progressing   Pt's goals remain appropriate and pt will continue to benefit from skilled PT services to work towards improved functional mobility including: bed mobility, transfers, w/c mobility, and gait.   3/3/2022

## 2022-03-04 LAB
ANION GAP SERPL CALC-SCNC: 9 MMOL/L (ref 8–16)
BASOPHILS # BLD AUTO: 0.03 K/UL (ref 0–0.2)
BASOPHILS NFR BLD: 0.5 % (ref 0–1.9)
BUN SERPL-MCNC: 36 MG/DL (ref 8–23)
CALCIUM SERPL-MCNC: 9 MG/DL (ref 8.7–10.5)
CHLORIDE SERPL-SCNC: 108 MMOL/L (ref 95–110)
CO2 SERPL-SCNC: 23 MMOL/L (ref 23–29)
CREAT SERPL-MCNC: 1.2 MG/DL (ref 0.5–1.4)
DIFFERENTIAL METHOD: ABNORMAL
EOSINOPHIL # BLD AUTO: 0.2 K/UL (ref 0–0.5)
EOSINOPHIL NFR BLD: 2.8 % (ref 0–8)
ERYTHROCYTE [DISTWIDTH] IN BLOOD BY AUTOMATED COUNT: 14.5 % (ref 11.5–14.5)
EST. GFR  (AFRICAN AMERICAN): 51.4 ML/MIN/1.73 M^2
EST. GFR  (NON AFRICAN AMERICAN): 44.6 ML/MIN/1.73 M^2
GLUCOSE SERPL-MCNC: 87 MG/DL (ref 70–110)
HCT VFR BLD AUTO: 28.2 % (ref 37–48.5)
HGB BLD-MCNC: 8.7 G/DL (ref 12–16)
IMM GRANULOCYTES # BLD AUTO: 0.02 K/UL (ref 0–0.04)
IMM GRANULOCYTES NFR BLD AUTO: 0.3 % (ref 0–0.5)
LYMPHOCYTES # BLD AUTO: 1 K/UL (ref 1–4.8)
LYMPHOCYTES NFR BLD: 17.4 % (ref 18–48)
MAGNESIUM SERPL-MCNC: 1.6 MG/DL (ref 1.6–2.6)
MCH RBC QN AUTO: 28.2 PG (ref 27–31)
MCHC RBC AUTO-ENTMCNC: 30.9 G/DL (ref 32–36)
MCV RBC AUTO: 92 FL (ref 82–98)
MONOCYTES # BLD AUTO: 0.4 K/UL (ref 0.3–1)
MONOCYTES NFR BLD: 6.9 % (ref 4–15)
NEUTROPHILS # BLD AUTO: 4.2 K/UL (ref 1.8–7.7)
NEUTROPHILS NFR BLD: 72.1 % (ref 38–73)
NRBC BLD-RTO: 0 /100 WBC
PHOSPHATE SERPL-MCNC: 3 MG/DL (ref 2.7–4.5)
PLATELET # BLD AUTO: 181 K/UL (ref 150–450)
PMV BLD AUTO: 12.1 FL (ref 9.2–12.9)
POTASSIUM SERPL-SCNC: 4.8 MMOL/L (ref 3.5–5.1)
RBC # BLD AUTO: 3.08 M/UL (ref 4–5.4)
SODIUM SERPL-SCNC: 140 MMOL/L (ref 136–145)
WBC # BLD AUTO: 5.8 K/UL (ref 3.9–12.7)

## 2022-03-04 PROCEDURE — 97110 THERAPEUTIC EXERCISES: CPT

## 2022-03-04 PROCEDURE — 99900035 HC TECH TIME PER 15 MIN (STAT)

## 2022-03-04 PROCEDURE — 84100 ASSAY OF PHOSPHORUS: CPT | Performed by: NURSE PRACTITIONER

## 2022-03-04 PROCEDURE — 94761 N-INVAS EAR/PLS OXIMETRY MLT: CPT

## 2022-03-04 PROCEDURE — 85025 COMPLETE CBC W/AUTO DIFF WBC: CPT | Performed by: NURSE PRACTITIONER

## 2022-03-04 PROCEDURE — 97116 GAIT TRAINING THERAPY: CPT

## 2022-03-04 PROCEDURE — 83735 ASSAY OF MAGNESIUM: CPT | Performed by: NURSE PRACTITIONER

## 2022-03-04 PROCEDURE — 25000003 PHARM REV CODE 250: Performed by: HOSPITALIST

## 2022-03-04 PROCEDURE — 80048 BASIC METABOLIC PNL TOTAL CA: CPT | Performed by: NURSE PRACTITIONER

## 2022-03-04 PROCEDURE — 25000003 PHARM REV CODE 250: Performed by: NURSE PRACTITIONER

## 2022-03-04 PROCEDURE — 36415 COLL VENOUS BLD VENIPUNCTURE: CPT | Performed by: NURSE PRACTITIONER

## 2022-03-04 PROCEDURE — 27000221 HC OXYGEN, UP TO 24 HOURS

## 2022-03-04 PROCEDURE — 11000004 HC SNF PRIVATE

## 2022-03-04 RX ORDER — FUROSEMIDE 20 MG/1
20 TABLET ORAL DAILY
Status: COMPLETED | OUTPATIENT
Start: 2022-03-05 | End: 2022-03-07

## 2022-03-04 RX ORDER — LISINOPRIL 10 MG/1
10 TABLET ORAL DAILY
Status: DISCONTINUED | OUTPATIENT
Start: 2022-03-05 | End: 2022-03-15

## 2022-03-04 RX ADMIN — PANTOPRAZOLE SODIUM 40 MG: 40 TABLET, DELAYED RELEASE ORAL at 09:03

## 2022-03-04 RX ADMIN — CHOLESTYRAMINE 4 G: 4 POWDER, FOR SUSPENSION ORAL at 09:03

## 2022-03-04 RX ADMIN — AMLODIPINE BESYLATE 10 MG: 10 TABLET ORAL at 09:03

## 2022-03-04 RX ADMIN — ASPIRIN 81 MG CHEWABLE TABLET 81 MG: 81 TABLET CHEWABLE at 09:03

## 2022-03-04 RX ADMIN — TRIAMCINOLONE ACETONIDE: 1 CREAM TOPICAL at 09:03

## 2022-03-04 RX ADMIN — TIOTROPIUM BROMIDE INHALATION SPRAY 2 PUFF: 3.12 SPRAY, METERED RESPIRATORY (INHALATION) at 09:03

## 2022-03-04 RX ADMIN — Medication 100 MG: at 09:03

## 2022-03-04 RX ADMIN — TICAGRELOR 90 MG: 90 TABLET ORAL at 09:03

## 2022-03-04 RX ADMIN — FLUTICASONE FUROATE AND VILANTEROL TRIFENATATE 1 PUFF: 100; 25 POWDER RESPIRATORY (INHALATION) at 09:03

## 2022-03-04 RX ADMIN — ATORVASTATIN CALCIUM 80 MG: 20 TABLET, FILM COATED ORAL at 09:03

## 2022-03-04 RX ADMIN — METOPROLOL SUCCINATE 50 MG: 50 TABLET, EXTENDED RELEASE ORAL at 09:03

## 2022-03-04 RX ADMIN — Medication 1 CAPSULE: at 09:03

## 2022-03-04 RX ADMIN — GABAPENTIN 100 MG: 100 CAPSULE ORAL at 09:03

## 2022-03-04 NOTE — PLAN OF CARE
Problem: Physical Therapy Goal  Goal: Physical Therapy Goal  Description: Goals to be met by: 3/8/22    Patient will increase functional independence with mobility by performing:    . Supine to sit with [place]  . Sit to supine with [place]  . Rolling to Left and Right with [place].  . Sit to stand transfer with Supervision-met 3/3/22  . Bed to chair transfer with Supervision using Rolling Walker  . Gait  x 100 feet with Supervision using Rolling Walker.   . Wheelchair propulsion x50 feet with Supervision using bilateral uppper extremities  . Ascend/Descend 4 inch curb step with Contact Guard Assistance using Rolling Walker.-met 3/3/22  . Retrieve object off floor with RW and reacher and SBA.    Outcome: Ongoing, Progressing   Pt's goals remain appropriate and pt will continue to benefit from skilled PT services to work towards improved functional mobility including: bed mobility, transfers, up/down step, and gait.   3/4/2022

## 2022-03-04 NOTE — PLAN OF CARE
Problem: Adult Inpatient Plan of Care  Goal: Plan of Care Review  Outcome: Ongoing, Progressing  Goal: Patient-Specific Goal (Individualized)  Outcome: Ongoing, Progressing  Goal: Absence of Hospital-Acquired Illness or Injury  Outcome: Ongoing, Progressing  Goal: Optimal Comfort and Wellbeing  Outcome: Ongoing, Progressing     Problem: Diabetes Comorbidity  Goal: Blood Glucose Level Within Targeted Range  Outcome: Ongoing, Progressing     Problem: Fall Injury Risk  Goal: Absence of Fall and Fall-Related Injury  Outcome: Ongoing, Progressing     Problem: Skin Injury Risk Increased  Goal: Skin Health and Integrity  Outcome: Ongoing, Progressing     Problem: Impaired Wound Healing  Goal: Optimal Wound Healing  Outcome: Ongoing, Progressing     Problem: Respiratory Compromise (Heart Failure)  Goal: Effective Oxygenation and Ventilation  Outcome: Ongoing, Progressing

## 2022-03-04 NOTE — PLAN OF CARE
Problem: Adult Inpatient Plan of Care  Goal: Plan of Care Review  Outcome: Ongoing, Progressing  Goal: Patient-Specific Goal (Individualized)  Outcome: Ongoing, Progressing  Goal: Absence of Hospital-Acquired Illness or Injury  Outcome: Ongoing, Progressing  Goal: Optimal Comfort and Wellbeing  Outcome: Ongoing, Progressing     Problem: Diabetes Comorbidity  Goal: Blood Glucose Level Within Targeted Range  Outcome: Ongoing, Progressing     Problem: Fall Injury Risk  Goal: Absence of Fall and Fall-Related Injury  Outcome: Ongoing, Progressing     Problem: Impaired Wound Healing  Goal: Optimal Wound Healing  Outcome: Ongoing, Progressing     Problem: Respiratory Compromise (Heart Failure)  Goal: Effective Oxygenation and Ventilation  Outcome: Ongoing, Progressing

## 2022-03-04 NOTE — PROGRESS NOTES
[facility]                                  Skilled Nursing Facility                   Progress Note     Admit Date: 2/21/2022  LUZ  TBD  Principal Problem:  NSTEMI (non-ST elevated myocardial infarction)   HPI obtained from patient interview and chart review     Chief Complaint: Re-evaluation of medical treatment and therapy status: Lab review, re-evaluation of renal function, hypomagnesemia    HPI:   Ms Kerr is a 76 year old female with PMHx of DM2, HTN, HLD, CKD, COPD on home O2 who presents SNF following hospitalization for NSTEMI.  Admission to SNF for secondary to weakness and debility.    Interval history: All of today's labs reviewed and are listed below.  BUN/creatinine 36/1.2 from 44/1.4, Mag 1.6.  24 hr vital sign ranges listed below.  24 hour blood pressure range is 128/68 to 163/70.  Patient's weight is overall greatly decreased throughout SNF stay, admitted with weight of 84.5 kg on 02/21, current weight on 03/03 was 74.9 kg.  Patient reports pain to her bilateral heels when she lays in the bed, heels inspected and reveal blanchable redness.  Will order offloading boots.  Patient denies shortness of breath, abdominal discomfort, nausea, or vomiting.  Patient reports an adequate appetite.  Patient denies dysuria.  Patient reports having regular bowel movements.  Patient progessing with PT/OT- Gait: 90ft x 2 trials with RW with CGA. Pt required verbal cues to step closer to the walker and for upright posture. pt had a resting standing break x 1 time during 2nd gait trial for ~ 20 sec due to SOB. pt ambulated on 2LPM oxygen. Continuing to follow and treat all acute and chronic conditions.    Past Medical History: Patient has a past medical history of CAD (coronary artery disease), Cancer, CHF (congestive heart failure), Colitis, Colon cancer, COPD (chronic obstructive pulmonary disease), Decreased hearing, Diabetes mellitus,  Diabetes mellitus, type 2, Hypercholesterolemia, Hypertension, Hypoxemia, Insomnia, Obesity, and Osteoarthritis.    Past Surgical History: Patient has a past surgical history that includes External ear surgery; Colectomy; Cholecystectomy; Flexible sigmoidoscopy (N/A, 9/19/2019); Eye surgery; Hysterectomy; Coronary stent placement; Cardiac catheterization; Coronary angioplasty; and ANGIOGRAM, CORONARY, WITH LEFT HEART CATHETERIZATION (N/A, 2/10/2022).    Social History: Patient reports that she quit smoking about 16 years ago. Her smoking use included cigarettes. She started smoking about 57 years ago. She has a 150.00 pack-year smoking history. She has never used smokeless tobacco. She reports current alcohol use. She reports that she does not use drugs.    Family History: family history includes Febrile seizures in her mother; Heart failure in her father.    Allergies: Patient is allergic to iodinated contrast media and strawberries [strawberry].    ROS  Constitutional: Negative for fever   Eyes: Negative for blurred vision, double vision   Respiratory: Negative for cough, shortness of breath   Cardiovascular: Negative for chest pain, palpitations, and leg swelling.   Gastrointestinal: Negative for abdominal pain, constipation, diarrhea, nausea, vomiting.   Genitourinary:  Negative for dysuria, frequency   Musculoskeletal:  + generalized weakness.  Pain to bilateral heels when lying in bed   Skin: Negative for itching and rash.   Neurological: Negative for dizziness, headaches.   Psychiatric/Behavioral: Negative for depression. The patient is not nervous/anxious.      24 hour Vital Sign Range   Temp:  [98 °F (36.7 °C)-98.4 °F (36.9 °C)]   Pulse:  [68]   Resp:  [18]   BP: (128-163)/(68-70)   SpO2:  [95 %-98 %]     PEx  Constitutional: Patient appears debilitated.  No distress noted  HENT:   Head: Normocephalic and atraumatic.   Eyes: Pupils are equal, round  Neck: Normal range of motion. Neck supple.    Cardiovascular: Normal rate, regular rhythm and normal heart sounds.    Pulmonary/Chest: Effort normal and breath sounds are clear with diminished bases.  Supplemental oxygen in progress  Abdominal: Soft. Bowel sounds are normal.   Musculoskeletal: Normal range of motion.   Neurological: Alert and oriented to person, place, and time.   Psychiatric: Normal mood and affect. Behavior is normal.   Skin: Skin is warm and dry.  PVD changes to bilateral lower extremities    Recent Labs   Lab 03/04/22  0451      K 4.8      CO2 23   BUN 36*   CREATININE 1.2   MG 1.6       Recent Labs   Lab 03/04/22  0451   WBC 5.80   RBC 3.08*   HGB 8.7*   HCT 28.2*      MCV 92   MCH 28.2   MCHC 30.9*         Assessment and Plan:      CKD (chronic kidney disease), stage III  - Baseline sCr 1.5- 1.8   - ISSA resolved, continue to monitor twice weekly BMPs, avoid nephrotoxic agents, renally dose medications when appropriate    Acute on chronic diastolic congestive heart failure  - TTE notable for Grade II left ventricular diastolic dysfunction, EF 65%  - admitted with Lasix 40 mg BID, lisinopril 10 mg daily  - Cardiac diet with Fluid restriction at 1.5L with strict I/Os and daily STANDING weights  - 3/4 Lasix previously held due to ISSA, resumed at 20 mg daily today, patient is weights continue to decrease    Hypomagnesemia  - initiated magnesium oxide 400 mg BID x2 days    NSTEMI (non-ST elevated myocardial infarction)  Coronary artery disease,  multivessel with history of previous PCI  - Recent hx of increasing episodes of angina requiring more frequent Nitroglycerin use and underwent LHC at OSH.   -Recent Left heart cath[02/11/22] showed:  · The RPAV lesion was 100% stenosed.  · The RPDA lesion was 100% stenosed.  · The Prox Cx to Mid Cx lesion was 80% stenosed.  · The 1st LPL lesion was 90% stenosed.  · The Prox RCA-2 lesion was 70% stenosed.  · The Prox RCA-1 lesion was 90% stenosed.  · The Mid LAD-2 lesion was 60%  stenosed.  - continue 81 mg ASA qd, Atorvastatin 80mg qhs, Metoprolol 50 mg XL, Ticagrelor 90mg bid  - [facility] deferred given rectus sheath hematoma. Plan for IC intervention 1-2 weeks after admission for outpatient.  Coronary atherosclerosis    Essential hypertension, benign  - home regimen with amlodipine 5 mg daily, benazepril 40 mg daily  - continue amlodipine 10 mg daily, lisinopril 10 mg daily, metoprolol XL 50 mg daily  - 3/4 resumed lisinopril 10 mg daily that was on hold for ISSA that is now resolved    COPD/emphysema  - Known history of severe COPD (GOLD IV D PFT 2020). On home oxygen (2-3L)  - Home medications: albuterol (VENTOLIN HFA) 90 mcg/actuation inhaler, ipratropium-albuteroL (COMBIVENT RESPIMAT)  mcg/actuation inhaler,  umeclidinium (INCRUSE ELLIPTA) 62.5 mcg/actuation inhalation capsule, fluticasone-salmeterol diskus inhaler 250-50 mcg  - continue Breo and Spiriva inhaler   - DuoNebs PRN    DM type 2, controlled, with complication  - last A1c 5.2 on 01/27/2022  - diabetic diet, Accu-Cheks AC/HS  - continue LDSSI PRN     Dysuria  - UA with reflex ordered    Gastroesophageal reflux disease without esophagitis  - continue Protonix 40 mg daily    Hypercholesterolemia  - continue Atorvastatin 40mg    Obesity  - BMI currently 33.94  - RD following, weight loss encouraged    Rectus sheath hematoma  - Patient had severe abdominal pain the day before with a mass on the abdomen. Patient was unable to have a BM yesterday. KUB was drawn and showed a large stool burden. Continued bowel regimen but pain was persistent. CT abdomen without contrast and lactic drawn. CT abdomen showed rectal sheath hematoma extending to the pelvis. IR consulted and recommended CTA of the abdomen to identify bleeding vessel. Due to kidney function, held off on CTA for concerns of contrast induced nephropathy as patient had a Alfredo score of 16 with 100cc of contrast, 15 with 75 cc contrast. IR consulted and following. Hgb went  from 13 on admission --> 10.6 --> 9.9 --> 8.9. hematoma was expanding. IR performed embolization of inferior epigastric and collateral vessels.   - continue to monitor abdominal mass     Venous stasis  - Chronic history of venous stasis of bilateral lower extremities limited to breakdown of skin without varicose veins  - Patient denies increase in leg swelling over her baseline  - wound care RN following    Vitamin-D deficiency  - continue ergocalciferol 05943 units weekly    Debility   - Continue with PT/OT for gait training and strengthening and restoration of ADL's   - Encourage mobility, OOB in chair, and early ambulation as appropriate  - Fall precautions   - Monitor for bowel and bladder dysfunction  - Monitor for and prevent skin breakdown and pressure ulcers  - Continue DVT prophylaxis with ASA/ticagrelor, frequent ambulation         Anticipate disposition:  Home with home health      Follow-up needed during SNF stay- interventional cardiology (3/17)    Follow-up needed after discharge from SNF: PCP,     Future Appointments   Date Time Provider Department Center   3/7/2022  7:15 AM [facility] SPEC LAB [facility] SPECLAB [facility]   3/17/2022 10:40 AM Scott Chappell MD [facility] JEYSON [facility]   4/27/2022 11:20 AM Antonieta Marquez NP [facility]       Natalie Clemons NP  Department of Hospital Medicine   [facility]     DOS: 3/4/2022       Patient note was created using MModal Dictation.  Any errors in syntax or even information may not have been identified and edited on initial review prior to signing this note.

## 2022-03-04 NOTE — PT/OT/SLP PROGRESS
Occupational Therapy      Patient Name:  Marisol Kerr   MRN:  [id number]    Patient not seen today secondary to polite decline due to fatigue and SOB  . Will follow-up with OT POC.    3/4/2022

## 2022-03-04 NOTE — PT/OT/SLP PROGRESS
"Physical Therapy Treatment    Patient Name:  Marisol Kerr   MRN:  [id number]  Admit Date: 2/21/2022  Admitting Diagnosis: NSTEMI (non-ST elevated myocardial infarction)  General Precautions: Standard, fall   Orthopedic Precautions:N/A   Braces: N/A     Recommendations:     Discharge Recommendations:  home health PT   Level of Assistance Recommended at Discharge: Intermittent assistance   Discharge Equipment Recommendations: none   Barriers to discharge: Decreased caregiver support    Assessment:     Marisol Kerr is a 76 y.o. female admitted with a medical diagnosis of NSTEMI (non-ST elevated myocardial infarction) . Pt is unsafe with functional mobility at this time due to pt requires SBA to minimal assist for bed mobility, SBA for transfers, and CGA for gait due to SOB, weakness, and fatigue. Pt is motivated to progress with functional mobility.    Performance deficits affecting function:  weakness, impaired endurance, impaired functional mobilty, gait instability, impaired cardiopulmonary response to activity, edema .    Rehab Potential is good    Activity Tolerance: Fair, pt c/o increased SOB as compared to yesterday    Plan:     Patient to be seen 6 x/week to address the above listed problems via gait training, therapeutic activities, therapeutic exercises, neuromuscular re-education, wheelchair management/training    · Plan of Care Expires: 03/21/22  · Plan of Care Reviewed with: patient, daughter    Subjective   "I didn't sleep well, my legs were hurting me"  Pain/Comfort:  · Pain Rating 1: 0/10  · Pain Rating Post-Intervention 1: 0/10    Patient's cultural, spiritual, Pentecostal conflicts given the current situation:  · no    Objective:     Communicated with nurse prior to session.  Patient found up in chair with oxygen upon PT entry to room.     Therapeutic Activities and Exercises: Pt performed B LE exs in sitting x 10 reps: hip flex, knee ext, and ankle pumps with rest periods in between due to " SOB    Functional Mobility:  · Bed Mobility:     · Rolling Left:  stand by assistance  · Rolling Right: stand by assistance  · Supine to Sit: minimum assistance  · Sit to Supine: stand by assistance  · Transfers:     · Sit to Stand:  stand by assistance with rolling walker  · Bed to Chair: contact guard assistance with  no AD  using  Stand Pivot  · Gait: 90ft x 2 trials with RW with CGA. Pt required verbal cues to step closer to the walker and for upright posture. pt had a resting standing break x 1 time during 2nd gait trial for ~ 20 sec due to SOB. pt ambulated on 2LPM oxygen    AM-PAC 6 CLICK MOBILITY  19    Patient left up in chair with all lines intact, call button in reach and nurse notified.    GOALS:   Multidisciplinary Problems     Physical Therapy Goals        Problem: Physical Therapy Goal    Goal Priority Disciplines Outcome Goal Variances Interventions   Physical Therapy Goal     PT, PT/OT Ongoing, Progressing     Description: Goals to be met by: 3/14/22    Patient will increase functional independence with mobility by performing:    . Supine to sit with [place]  . Sit to supine with [place]  . Rolling to Left and Right with [place].  . Sit to stand transfer with Supervision-met 3/3/22  . Bed to chair transfer with Supervision using Rolling Walker  . Gait  x 100 feet with Supervision using Rolling Walker.   . Wheelchair propulsion x50 feet with Supervision using bilateral uppper extremities  . Ascend/Descend 4 inch curb step with Contact Guard Assistance using Rolling Walker.-met 3/3/22  . Retrieve object off floor with RW and reacher and SBA.                     Time Tracking:     PT Received On: 03/04/22  PT Total Time (min):   40    Billable Minutes: Gait Training 23 and Therapeutic Exercise 17    Treatment Type: Treatment  PT/PTA: PT     PTA Visit Number: 0     03/04/2022

## 2022-03-05 PROCEDURE — 25000003 PHARM REV CODE 250: Performed by: NURSE PRACTITIONER

## 2022-03-05 PROCEDURE — 27000221 HC OXYGEN, UP TO 24 HOURS

## 2022-03-05 PROCEDURE — 11000004 HC SNF PRIVATE

## 2022-03-05 PROCEDURE — 97535 SELF CARE MNGMENT TRAINING: CPT | Mod: CO

## 2022-03-05 PROCEDURE — 99900035 HC TECH TIME PER 15 MIN (STAT)

## 2022-03-05 PROCEDURE — 25000003 PHARM REV CODE 250: Performed by: HOSPITALIST

## 2022-03-05 PROCEDURE — 25000242 PHARM REV CODE 250 ALT 637 W/ HCPCS: Performed by: NURSE PRACTITIONER

## 2022-03-05 PROCEDURE — 94761 N-INVAS EAR/PLS OXIMETRY MLT: CPT

## 2022-03-05 RX ADMIN — TICAGRELOR 90 MG: 90 TABLET ORAL at 08:03

## 2022-03-05 RX ADMIN — ATORVASTATIN CALCIUM 80 MG: 20 TABLET, FILM COATED ORAL at 08:03

## 2022-03-05 RX ADMIN — Medication 100 MG: at 09:03

## 2022-03-05 RX ADMIN — METOPROLOL SUCCINATE 50 MG: 50 TABLET, EXTENDED RELEASE ORAL at 09:03

## 2022-03-05 RX ADMIN — FLUTICASONE FUROATE AND VILANTEROL TRIFENATATE 1 PUFF: 100; 25 POWDER RESPIRATORY (INHALATION) at 09:03

## 2022-03-05 RX ADMIN — PSYLLIUM HUSK 1 PACKET: 3.4 POWDER ORAL at 09:03

## 2022-03-05 RX ADMIN — CHOLESTYRAMINE 4 G: 4 POWDER, FOR SUSPENSION ORAL at 08:03

## 2022-03-05 RX ADMIN — LISINOPRIL 10 MG: 10 TABLET ORAL at 09:03

## 2022-03-05 RX ADMIN — PANTOPRAZOLE SODIUM 40 MG: 40 TABLET, DELAYED RELEASE ORAL at 09:03

## 2022-03-05 RX ADMIN — TIOTROPIUM BROMIDE INHALATION SPRAY 2 PUFF: 3.12 SPRAY, METERED RESPIRATORY (INHALATION) at 09:03

## 2022-03-05 RX ADMIN — CHOLESTYRAMINE 4 G: 4 POWDER, FOR SUSPENSION ORAL at 09:03

## 2022-03-05 RX ADMIN — TICAGRELOR 90 MG: 90 TABLET ORAL at 09:03

## 2022-03-05 RX ADMIN — AMLODIPINE BESYLATE 10 MG: 10 TABLET ORAL at 09:03

## 2022-03-05 RX ADMIN — GABAPENTIN 100 MG: 100 CAPSULE ORAL at 08:03

## 2022-03-05 RX ADMIN — TRIAMCINOLONE ACETONIDE: 1 CREAM TOPICAL at 09:03

## 2022-03-05 RX ADMIN — ASPIRIN 81 MG CHEWABLE TABLET 81 MG: 81 TABLET CHEWABLE at 09:03

## 2022-03-05 RX ADMIN — Medication 1 CAPSULE: at 09:03

## 2022-03-05 RX ADMIN — FUROSEMIDE 20 MG: 20 TABLET ORAL at 09:03

## 2022-03-05 NOTE — PLAN OF CARE
Problem: Occupational Therapy Goal  Goal: Occupational Therapy Goal  Description: Goals to be met by: 3/8/22     Patient will increase functional independence with ADLs by performing:    UE Dressing with Modified [place]. MET  LE Dressing with Supervision. Using hip kit to perform LBD. =MET  Grooming while seated at sink with Modified [place]. MET  UPDATED: Grooming while standing at the sink with Supervision.   Toileting from toilet or BSC with Supervision for hygiene and clothing management. =MET  Bathing from  sitting at sink with Minimal Assistance with (A) to wash distal LEs  Supine to sit with Modified [place].=Ongoing  Stand pivot transfers with Supervision with RW to steady.   Upper extremity exercise with UBE  for 10 minutes to increase functional endurance with supervision. MET  Caregiver will be educated on level of assist required to safely perform self care tasks and functional transfers..     Outcome: Ongoing, Progressing

## 2022-03-05 NOTE — PT/OT/SLP PROGRESS
"Occupational Therapy   Treatment    Name: Marisol Kerr  MRN: [id number]  Admit Date: 2/21/2022  Admitting Diagnosis:  NSTEMI (non-ST elevated myocardial infarction)    General Precautions: Standard, fall   Orthopedic Precautions:N/A   Braces:       Recommendations:     Discharge Recommendations: home health OT  Level of Assistance Recommended at Discharge: Intermittent assistance for ADL's and homemaking tasks  Discharge Equipment Recommendations:  none (Pt has BSC, shower chair, grab bars, a RW, and a transport chair.)  Barriers to discharge:  Decreased caregiver support    Assessment:     Marisol Kerr is a 76 y.o. female with a medical diagnosis of NSTEMI (non-ST elevated myocardial infarction) .  She presents with performance deficits affecting function are weakness, impaired endurance, impaired self care skills, impaired functional mobility, gait instability, impaired balance, decreased coordination, decreased lower extremity function, decreased ROM, impaired skin, impaired cardiopulmonary response to activity    Pt. participated well with session on this day. Pt is progressing well with session on this day still continues to requires cues with aspects of safety . Pt. Will continue to benefit from continued OT to progress towards goals    Rehab Potential is good    Activity tolerance:  Good    Plan:     Patient to be seen 6 x/week to address the above listed problems via self-care/home management, therapeutic activities, therapeutic exercises    · Plan of Care Expires: 03/22/22  · Plan of Care Reviewed with: patient    Subjective     Communicated with: Rosmery prior to session. "Good morning" .A client care conference was performed between the [place]OL[place] and JAYSON, prior to treatment to discuss the patient's status, treatment plan and established goals.     Pain/Comfort:  Pain Rating 1: 6/10  Location - Side 1: Bilateral  Location - Orientation 1: generalized  Location 1: foot (Tail bone)  Pain Addressed 1: Reposition, " Distraction  Pain Rating Post-Intervention 1: 6/10    Patient's cultural, spiritual, Islam conflicts given the current situation:  no (Hoahaoism)    Objective:     Patient found supine with oxygen upon OT entry to room.    Bed Mobility:    · Patient completed Rolling/Turning to Right with stand by assistance  · Patient completed Scooting/Bridging with stand by assistance  · Patient completed Supine to Sit with stand by assistance   · Pt performed bed mobility with HOB flat and use of bed rails.    Functional Mobility/Transfers:  · Patient completed Sit <> Stand Transfer with stand by assistance  with  rolling walker   · Patient completed Bed <> Chair Transfer using Step Transfer technique with stand by assistance with rolling walker  · Patient completed Toilet Transfer Step Transfer technique with stand by assistance with  rolling walker and grab bars  · Functional Mobility: Pt performed ambulating bed>toilet>sink ~28 steps with SBA/RW.    Activities of Daily Living:  · Grooming: stand by assistance to perform oral/hair care standing at sink with increase time due to fatigue.  · Bathing: stand by assistance to perform sponge bathing seated on raised toilet. Pt required Max A to sponge bath BLE feet. Pt declined shower on today  · Upper Body Dressing: stand by assistance to perform doffing gown with A to untie stings on back. Pt performed donning pullover shirt seated with   · Lower Body Dressing: minimum assistance to pull pants over knees in standing. Pt performed threading BLE into pants legs using reacher with SBA. Pt performed donning BLE sock using Hip kit seated at EOB with SBA  · Toileting: stand by assistance to perform cleaning both [place]o areas in standing. Pt required A with diaper management and pulling pants over knees in standing.     [facility] 6 Click ADL: 22    Treatment & Education:    · Pt completed ADLs and func mobility activities for tx session as noted above    · Pt educated on role of OT and  POC    Patient left up in chair with call button in reachEducation:      GOALS:   Multidisciplinary Problems     Occupational Therapy Goals        Problem: Occupational Therapy Goal    Goal Priority Disciplines Outcome Interventions   Occupational Therapy Goal     OT, PT/OT Ongoing, Progressing    Description: Goals to be met by: 3/8/22     Patient will increase functional independence with ADLs by performing:    UE Dressing with Modified [place]. MET  LE Dressing with Supervision. Using hip kit to perform LBD. =MET  Grooming while seated at sink with Modified [place]. MET  UPDATED: Grooming while standing at the sink with Supervision.   Toileting from toilet or BSC with Supervision for hygiene and clothing management.   Bathing from  sitting at sink with Minimal Assistance with (A) to wash distal LEs  Supine to sit with Modified [place].  Stand pivot transfers with Supervision with RW to steady.   Upper extremity exercise with UBE  for 10 minutes to increase functional endurance with supervision. MET  Caregiver will be educated on level of assist required to safely perform self care tasks and functional transfers..                      Time Tracking:     OT Date of Treatment: OT Date of Treatment: 03/05/22  OT Total Time (min): Total Time (min): 41 min    Billable Minutes:Self Care/Home Management 41    3/5/2022

## 2022-03-06 PROCEDURE — 97116 GAIT TRAINING THERAPY: CPT | Mod: CQ

## 2022-03-06 PROCEDURE — 25000242 PHARM REV CODE 250 ALT 637 W/ HCPCS: Performed by: HOSPITALIST

## 2022-03-06 PROCEDURE — 25000003 PHARM REV CODE 250: Performed by: NURSE PRACTITIONER

## 2022-03-06 PROCEDURE — 25000003 PHARM REV CODE 250: Performed by: HOSPITALIST

## 2022-03-06 PROCEDURE — 27000221 HC OXYGEN, UP TO 24 HOURS

## 2022-03-06 PROCEDURE — 99900035 HC TECH TIME PER 15 MIN (STAT)

## 2022-03-06 PROCEDURE — 97110 THERAPEUTIC EXERCISES: CPT | Mod: CQ

## 2022-03-06 PROCEDURE — 11000004 HC SNF PRIVATE

## 2022-03-06 PROCEDURE — 94761 N-INVAS EAR/PLS OXIMETRY MLT: CPT

## 2022-03-06 RX ADMIN — FLUTICASONE FUROATE AND VILANTEROL TRIFENATATE 1 PUFF: 100; 25 POWDER RESPIRATORY (INHALATION) at 09:03

## 2022-03-06 RX ADMIN — LISINOPRIL 10 MG: 10 TABLET ORAL at 09:03

## 2022-03-06 RX ADMIN — TICAGRELOR 90 MG: 90 TABLET ORAL at 08:03

## 2022-03-06 RX ADMIN — PANTOPRAZOLE SODIUM 40 MG: 40 TABLET, DELAYED RELEASE ORAL at 08:03

## 2022-03-06 RX ADMIN — FUROSEMIDE 20 MG: 20 TABLET ORAL at 09:03

## 2022-03-06 RX ADMIN — CHOLESTYRAMINE 4 G: 4 POWDER, FOR SUSPENSION ORAL at 08:03

## 2022-03-06 RX ADMIN — AMLODIPINE BESYLATE 10 MG: 10 TABLET ORAL at 09:03

## 2022-03-06 RX ADMIN — ASPIRIN 81 MG CHEWABLE TABLET 81 MG: 81 TABLET CHEWABLE at 08:03

## 2022-03-06 RX ADMIN — Medication 100 MG: at 08:03

## 2022-03-06 RX ADMIN — TRIAMCINOLONE ACETONIDE: 1 CREAM TOPICAL at 09:03

## 2022-03-06 RX ADMIN — TRIAMCINOLONE ACETONIDE: 1 CREAM TOPICAL at 08:03

## 2022-03-06 RX ADMIN — TIOTROPIUM BROMIDE INHALATION SPRAY 2 PUFF: 3.12 SPRAY, METERED RESPIRATORY (INHALATION) at 04:03

## 2022-03-06 RX ADMIN — ATORVASTATIN CALCIUM 80 MG: 20 TABLET, FILM COATED ORAL at 08:03

## 2022-03-06 RX ADMIN — GABAPENTIN 100 MG: 100 CAPSULE ORAL at 08:03

## 2022-03-06 RX ADMIN — METOPROLOL SUCCINATE 50 MG: 50 TABLET, EXTENDED RELEASE ORAL at 09:03

## 2022-03-06 RX ADMIN — HYDROCODONE BITARTRATE AND ACETAMINOPHEN 1 TABLET: 5; 325 TABLET ORAL at 08:03

## 2022-03-06 NOTE — PLAN OF CARE
Problem: Adult Inpatient Plan of Care  Goal: Plan of Care Review  Outcome: Ongoing, Progressing  Goal: Patient-Specific Goal (Individualized)  Outcome: Ongoing, Progressing  Goal: Absence of Hospital-Acquired Illness or Injury  Outcome: Ongoing, Progressing  Goal: Optimal Comfort and Wellbeing  Outcome: Ongoing, Progressing     Problem: Diabetes Comorbidity  Goal: Blood Glucose Level Within Targeted Range  Outcome: Ongoing, Progressing     Problem: Fall Injury Risk  Goal: Absence of Fall and Fall-Related Injury  Outcome: Ongoing, Progressing

## 2022-03-06 NOTE — PT/OT/SLP PROGRESS
Physical Therapy Treatment    Patient Name:  Marisol Kerr   MRN:  [id number]  Admit Date: 2/21/2022  Admitting Diagnosis: NSTEMI (non-ST elevated myocardial infarction)    General Precautions: Standard, fall   Orthopedic Precautions:N/A   Braces:   None    Recommendations:     Discharge Recommendations:  home health PT   Level of Assistance Recommended at Discharge: Intermittent assistance   Discharge Equipment Recommendations: none   Barriers to discharge: Decreased caregiver support    Assessment:     Marisol Kerr is a 76 y.o. female admitted with a medical diagnosis of NSTEMI (non-ST elevated myocardial infarction) . requiring light assistance and verbal cues for transfers and gait to prevent falls due weakness, instability and fatigue.  Pt requires increased time to perform activities and rest breaks for recovery.   Pt remains motivated to participate in PT session and will cont to benefit from skilled PT intervention.  .      Performance deficits affecting function:  weakness, impaired endurance, impaired functional mobilty, gait instability, impaired cardiopulmonary response to activity, edema .    Rehab Potential is good    Activity Tolerance: Fair    Plan:     Patient to be seen 6 x/week to address the above listed problems via gait training, therapeutic activities, therapeutic exercises, neuromuscular re-education, wheelchair management/training    · Plan of Care Expires: 03/21/22  · Plan of Care Reviewed with: patient    Subjective     Pt with c/o fatigue.     Pain/Comfort:  · Pain Rating 1: 0/10  · Pain Rating Post-Intervention 1: 0/10    Patient's cultural, spiritual, Sabianism conflicts given the current situation:  · no    Objective:      Patient found up in chair with oxygen (2 LPM via NC) upon PT entry to room.     Therapeutic Activities and Exercises: Pt performs B LE AROM ther ex's while seated in chair x2 sets of 10 reps with vc's      Functional Mobility:  · Transfers:     · Sit to Stand:  stand by  assistance with rolling walker  · Gait: RW CGA to SBA 70ft x2 trials with prolonged seated rest break in between trials.  Pt demonstrates FFP, moderate SOB, tendency to keep AD away from body, decreased step length    AM-PAC 6 CLICK MOBILITY  18    Patient left up in chair with all lines intact and call button in reach.    GOALS:   Multidisciplinary Problems     Physical Therapy Goals        Problem: Physical Therapy Goal    Goal Priority Disciplines Outcome Goal Variances Interventions   Physical Therapy Goal     PT, PT/OT Ongoing, Progressing     Description: Goals to be met by: 3/14/22    Patient will increase functional independence with mobility by performing:    . Supine to sit with [place]  . Sit to supine with [place]  . Rolling to Left and Right with [place].  . Sit to stand transfer with Supervision-met 3/3/22  . Bed to chair transfer with Supervision using Rolling Walker  . Gait  x 100 feet with Supervision using Rolling Walker.   . Wheelchair propulsion x50 feet with Supervision using bilateral uppper extremities  . Ascend/Descend 4 inch curb step with Contact Guard Assistance using Rolling Walker.-met 3/3/22  . Retrieve object off floor with RW and reacher and SBA.                     Time Tracking:     PT Received On:   3/6/2022  PT Total Time (min):   38 min    Billable Minutes: Gait Training 23 and Therapeutic Exercise 15    Treatment Type: Treatment  PT/PTA: PTA     PTA Visit Number: 1 03/06/2022

## 2022-03-07 ENCOUNTER — LAB VISIT (OUTPATIENT)
Dept: LAB | Facility: OTHER | Age: 77
End: 2022-03-07
Payer: MEDICARE

## 2022-03-07 DIAGNOSIS — Z20.822 ENCOUNTER FOR LABORATORY TESTING FOR COVID-19 VIRUS: ICD-10-CM

## 2022-03-07 LAB
SARS-COV-2 RNA RESP QL NAA+PROBE: NOT DETECTED
SARS-COV-2- CYCLE NUMBER: NORMAL

## 2022-03-07 PROCEDURE — 25000003 PHARM REV CODE 250: Performed by: NURSE PRACTITIONER

## 2022-03-07 PROCEDURE — 11000004 HC SNF PRIVATE

## 2022-03-07 PROCEDURE — 97116 GAIT TRAINING THERAPY: CPT | Mod: CQ

## 2022-03-07 PROCEDURE — 97530 THERAPEUTIC ACTIVITIES: CPT | Mod: CQ

## 2022-03-07 PROCEDURE — 99900035 HC TECH TIME PER 15 MIN (STAT)

## 2022-03-07 PROCEDURE — 94761 N-INVAS EAR/PLS OXIMETRY MLT: CPT

## 2022-03-07 PROCEDURE — 97110 THERAPEUTIC EXERCISES: CPT | Mod: CQ

## 2022-03-07 PROCEDURE — 25000003 PHARM REV CODE 250: Performed by: HOSPITALIST

## 2022-03-07 PROCEDURE — U0003 INFECTIOUS AGENT DETECTION BY NUCLEIC ACID (DNA OR RNA); SEVERE ACUTE RESPIRATORY SYNDROME CORONAVIRUS 2 (SARS-COV-2) (CORONAVIRUS DISEASE [COVID-19]), AMPLIFIED PROBE TECHNIQUE, MAKING USE OF HIGH THROUGHPUT TECHNOLOGIES AS DESCRIBED BY CMS-2020-01-R: HCPCS | Performed by: EMERGENCY MEDICINE

## 2022-03-07 PROCEDURE — 97110 THERAPEUTIC EXERCISES: CPT | Mod: CO

## 2022-03-07 PROCEDURE — 27000221 HC OXYGEN, UP TO 24 HOURS

## 2022-03-07 PROCEDURE — 97535 SELF CARE MNGMENT TRAINING: CPT | Mod: CO

## 2022-03-07 RX ADMIN — FLUTICASONE FUROATE AND VILANTEROL TRIFENATATE 1 PUFF: 100; 25 POWDER RESPIRATORY (INHALATION) at 09:03

## 2022-03-07 RX ADMIN — TRIAMCINOLONE ACETONIDE: 1 CREAM TOPICAL at 09:03

## 2022-03-07 RX ADMIN — PANTOPRAZOLE SODIUM 40 MG: 40 TABLET, DELAYED RELEASE ORAL at 09:03

## 2022-03-07 RX ADMIN — ASPIRIN 81 MG CHEWABLE TABLET 81 MG: 81 TABLET CHEWABLE at 09:03

## 2022-03-07 RX ADMIN — HYDROCODONE BITARTRATE AND ACETAMINOPHEN 1 TABLET: 5; 325 TABLET ORAL at 09:03

## 2022-03-07 RX ADMIN — CHOLESTYRAMINE 4 G: 4 POWDER, FOR SUSPENSION ORAL at 09:03

## 2022-03-07 RX ADMIN — TICAGRELOR 90 MG: 90 TABLET ORAL at 09:03

## 2022-03-07 RX ADMIN — TIOTROPIUM BROMIDE INHALATION SPRAY 2 PUFF: 3.12 SPRAY, METERED RESPIRATORY (INHALATION) at 09:03

## 2022-03-07 RX ADMIN — GABAPENTIN 100 MG: 100 CAPSULE ORAL at 09:03

## 2022-03-07 RX ADMIN — LISINOPRIL 10 MG: 10 TABLET ORAL at 09:03

## 2022-03-07 RX ADMIN — FUROSEMIDE 20 MG: 20 TABLET ORAL at 09:03

## 2022-03-07 RX ADMIN — ATORVASTATIN CALCIUM 80 MG: 20 TABLET, FILM COATED ORAL at 09:03

## 2022-03-07 RX ADMIN — AMLODIPINE BESYLATE 10 MG: 10 TABLET ORAL at 09:03

## 2022-03-07 RX ADMIN — METOPROLOL SUCCINATE 50 MG: 50 TABLET, EXTENDED RELEASE ORAL at 09:03

## 2022-03-07 RX ADMIN — Medication 100 MG: at 08:03

## 2022-03-07 NOTE — PT/OT/SLP PROGRESS
"Occupational Therapy   Treatment    Name: Marisol Kerr  MRN: [id number]  Admit Date: 2/21/2022  Admitting Diagnosis:  NSTEMI (non-ST elevated myocardial infarction)    General Precautions: Standard, fall   Orthopedic Precautions:N/A   Braces:       Recommendations:     Discharge Recommendations: home health OT  Level of Assistance Recommended at Discharge: Intermittent assistance for ADL's and homemaking tasks  Discharge Equipment Recommendations:  none (Pt has BSC, shower chair, grab bars, a RW, and a transport chair.)  Barriers to discharge:  Decreased caregiver support    Assessment:     Marisol Kerr is a 76 y.o. female with a medical diagnosis of NSTEMI (non-ST elevated myocardial infarction) . Performance deficits affecting function are weakness, impaired endurance, impaired self care skills, impaired functional mobility, gait instability, impaired balance, decreased coordination, decreased lower extremity function, decreased ROM, impaired skin, impaired cardiopulmonary response to activity. Pt. participated well with session on this day. Pt is progressing well with session on this day still continues to requires cues with aspects of safety . Pt. Will continue to benefit from continued OT to progress towards goals    Rehab Potential is good    Activity tolerance:  Good    Plan:     Patient to be seen 6 x/week to address the above listed problems via self-care/home management, therapeutic activities, therapeutic exercises    · Plan of Care Expires: 03/22/22  · Plan of Care Reviewed with: patient    Subjective     Communicated with: latasha prior to session. "I forgot to put my O2 back on earlier and got out of breath"  O2 checked on arrival. O2 97%    Pain/Comfort:  Pain Rating 1: 0/10  Pain Rating Post-Intervention 1: 0/10    Patient's cultural, spiritual, Uatsdin conflicts given the current situation:  no (Pentecostal)    Objective:     Patient found seated on toilet  with oxygen upon OT entry to room.    Functional " Mobility/Transfers:  · Patient completed Sit <> Stand Transfer with stand by assistance  with  rolling walker   · Patient completed Toilet Transfer Step Transfer technique with stand by assistance with  rolling walker  · Functional Mobility: Pt. With fxl mobility throughout room and bathroom with RW and SBA/CGA. NO LOB noted    Activities of Daily Living:  · Grooming: stand by assistance with grooming aspects instance   · Upper Body Dressing: supervision to doff/beth pullover shirt while seated  · Lower Body Dressing: stand by assistance to beth pants over hips instance with use of reacher  · Toileting: stand by assistance with hygiene and clothing management     [facility] 6 Click ADL: 22    OT Exercises: UE Ergometer 15 mins with moderate resistance    Treatment & Education:  Pt. With free weight activity with 2x15 reps with  shd flex, bicep curls horz adb/add and forward flex motion to increase BUE ROM and strength,.     Pt. With standing and therex performed to increase ROM, endurance selfcare task and fxl mobility for independence     Patient left up in chair with all lines intact and call button in reachEducation:      GOALS:   Multidisciplinary Problems     Occupational Therapy Goals        Problem: Occupational Therapy Goal    Goal Priority Disciplines Outcome Interventions   Occupational Therapy Goal     OT, PT/OT Ongoing, Progressing    Description: Goals to be met by: 3/8/22     Patient will increase functional independence with ADLs by performing:    UE Dressing with Modified [place]. MET  LE Dressing with Supervision. Using hip kit to perform LBD. =MET  Grooming while seated at sink with Modified [place]. MET  UPDATED: Grooming while standing at the sink with Supervision.   Toileting from toilet or BSC with Supervision for hygiene and clothing management.   Bathing from  sitting at sink with Minimal Assistance with (A) to wash distal LEs  Supine to sit with Modified [place].  Stand pivot  transfers with Supervision with RW to steady.   Upper extremity exercise with UBE  for 10 minutes to increase functional endurance with supervision. MET  Caregiver will be educated on level of assist required to safely perform self care tasks and functional transfers..                      Time Tracking:     OT Date of Treatment: OT Date of Treatment: 03/07/22  OT Total Time (min): Total Time (min): 42 min    Billable Minutes:Self Care/Home Management 17  Therapeutic Exercise 25    3/7/2022   OT and TYLER have discussed the above patients goals and status in collaboration with Plan of Care.

## 2022-03-07 NOTE — PLAN OF CARE
SW met with pt to discuss updated d/c date of 3/21. Pt is concerned that she has another upcoming surgery and she does not want to use all her SNF days in the event she needs SNF after surgery. SW agreed to get an update from the team and update pt and her dtg Marisol.    Veena Dwyer, CHAPARROW  PRN

## 2022-03-07 NOTE — PT/OT/SLP PROGRESS
"Physical Therapy Treatment    Patient Name:  Marisol Kerr   MRN:  [id number]  Admit Date: 2/21/2022  Admitting Diagnosis: NSTEMI (non-ST elevated myocardial infarction)  Recent Surgeries:     General Precautions: Standard, fall   Orthopedic Precautions:N/A   Braces:       Recommendations:     Discharge Recommendations:  home health PT   Level of Assistance Recommended at Discharge: Intermittent assistance   Discharge Equipment Recommendations: none   Barriers to discharge: Decreased caregiver support    Assessment:     Marisol Kerr is a 76 y.o. female admitted with a medical diagnosis of NSTEMI (non-ST elevated myocardial infarction) . Pt tolerated well, pt would continue to benefit from skilled PT services to improve overall functional mobility, strength and endurance.  .      Performance deficits affecting function:  weakness, impaired endurance, impaired functional mobilty, gait instability, impaired cardiopulmonary response to activity, edema .    Rehab Potential is good    Activity Tolerance: Fair    Plan:     Patient to be seen 6 x/week to address the above listed problems via gait training, therapeutic activities, therapeutic exercises, neuromuscular re-education, wheelchair management/training    · Plan of Care Expires: 03/21/22  · Plan of Care Reviewed with: patient    Subjective     "feel like I',m ready to go home".     Pain/Comfort:  · Pain Rating 1: 0/10  · Pain Rating Post-Intervention 1: 0/10    Patient's cultural, spiritual, Presybeterian conflicts given the current situation:  · no    Objective:       Patient found with oxygen (in BSC, O2 2L) upon PT entry to room.     Therapeutic Activities and Exercises: 2x10 reps AP,LAQ mini elliptical x 10 minutes    Functional Mobility:  · Transfers:     · Sit to Stand:  supervision with rolling walker  · BSChair to WChair: supervision and stand by assistance with  rolling walker  using  Stand Pivot  · Gait: amb with RW SBA ~ 108 ft and 115 ft seated rest break wc/02 " in tow no LOB occ vcs for erect posture and staying closer to RW    AM-PAC 6 CLICK MOBILITY  18    Patient left up in chair with all lines intact, call button in reach and belongs in reach.    GOALS:   Multidisciplinary Problems     Physical Therapy Goals        Problem: Physical Therapy Goal    Goal Priority Disciplines Outcome Goal Variances Interventions   Physical Therapy Goal     PT, PT/OT Ongoing, Progressing     Description: Goals to be met by: 3/14/22    Patient will increase functional independence with mobility by performing:    . Supine to sit with [place]  . Sit to supine with [place]  . Rolling to Left and Right with [place].  . Sit to stand transfer with Supervision-met 3/3/22  . Bed to chair transfer with Supervision using Rolling Walker  . Gait  x 100 feet with Supervision using Rolling Walker.   . Wheelchair propulsion x50 feet with Supervision using bilateral uppper extremities  . Ascend/Descend 4 inch curb step with Contact Guard Assistance using Rolling Walker.-met 3/3/22  . Retrieve object off floor with RW and reacher and SBA.                     Time Tracking:     PT Received On: 03/07/22  PT Total Time (min):       Billable Minutes: Gait Training 15, Therapeutic Activity 10 and Therapeutic Exercise 15    Treatment Type: Treatment  PT/PTA: PTA     PTA Visit Number: 2     03/07/2022

## 2022-03-08 LAB
ANION GAP SERPL CALC-SCNC: 8 MMOL/L (ref 8–16)
BASOPHILS # BLD AUTO: 0.02 K/UL (ref 0–0.2)
BASOPHILS NFR BLD: 0.4 % (ref 0–1.9)
BUN SERPL-MCNC: 46 MG/DL (ref 8–23)
CALCIUM SERPL-MCNC: 8.7 MG/DL (ref 8.7–10.5)
CHLORIDE SERPL-SCNC: 113 MMOL/L (ref 95–110)
CO2 SERPL-SCNC: 21 MMOL/L (ref 23–29)
CREAT SERPL-MCNC: 1.5 MG/DL (ref 0.5–1.4)
DIFFERENTIAL METHOD: ABNORMAL
EOSINOPHIL # BLD AUTO: 0.2 K/UL (ref 0–0.5)
EOSINOPHIL NFR BLD: 3.2 % (ref 0–8)
ERYTHROCYTE [DISTWIDTH] IN BLOOD BY AUTOMATED COUNT: 15.1 % (ref 11.5–14.5)
EST. GFR  (AFRICAN AMERICAN): 39.3 ML/MIN/1.73 M^2
EST. GFR  (NON AFRICAN AMERICAN): 34.1 ML/MIN/1.73 M^2
GLUCOSE SERPL-MCNC: 80 MG/DL (ref 70–110)
HCT VFR BLD AUTO: 28.3 % (ref 37–48.5)
HGB BLD-MCNC: 8.6 G/DL (ref 12–16)
IMM GRANULOCYTES # BLD AUTO: 0.01 K/UL (ref 0–0.04)
IMM GRANULOCYTES NFR BLD AUTO: 0.2 % (ref 0–0.5)
LYMPHOCYTES # BLD AUTO: 0.9 K/UL (ref 1–4.8)
LYMPHOCYTES NFR BLD: 16.9 % (ref 18–48)
MAGNESIUM SERPL-MCNC: 1.6 MG/DL (ref 1.6–2.6)
MCH RBC QN AUTO: 28.9 PG (ref 27–31)
MCHC RBC AUTO-ENTMCNC: 30.4 G/DL (ref 32–36)
MCV RBC AUTO: 95 FL (ref 82–98)
MONOCYTES # BLD AUTO: 0.4 K/UL (ref 0.3–1)
MONOCYTES NFR BLD: 8 % (ref 4–15)
NEUTROPHILS # BLD AUTO: 3.8 K/UL (ref 1.8–7.7)
NEUTROPHILS NFR BLD: 71.3 % (ref 38–73)
NRBC BLD-RTO: 0 /100 WBC
PHOSPHATE SERPL-MCNC: 3.2 MG/DL (ref 2.7–4.5)
PLATELET # BLD AUTO: 172 K/UL (ref 150–450)
PMV BLD AUTO: 11.6 FL (ref 9.2–12.9)
POTASSIUM SERPL-SCNC: 4.8 MMOL/L (ref 3.5–5.1)
RBC # BLD AUTO: 2.98 M/UL (ref 4–5.4)
SODIUM SERPL-SCNC: 142 MMOL/L (ref 136–145)
WBC # BLD AUTO: 5.38 K/UL (ref 3.9–12.7)

## 2022-03-08 PROCEDURE — 25000003 PHARM REV CODE 250: Performed by: HOSPITALIST

## 2022-03-08 PROCEDURE — 84100 ASSAY OF PHOSPHORUS: CPT | Performed by: NURSE PRACTITIONER

## 2022-03-08 PROCEDURE — 27000221 HC OXYGEN, UP TO 24 HOURS

## 2022-03-08 PROCEDURE — 25000242 PHARM REV CODE 250 ALT 637 W/ HCPCS: Performed by: HOSPITALIST

## 2022-03-08 PROCEDURE — 94761 N-INVAS EAR/PLS OXIMETRY MLT: CPT

## 2022-03-08 PROCEDURE — 85025 COMPLETE CBC W/AUTO DIFF WBC: CPT | Performed by: NURSE PRACTITIONER

## 2022-03-08 PROCEDURE — 11000004 HC SNF PRIVATE

## 2022-03-08 PROCEDURE — 36415 COLL VENOUS BLD VENIPUNCTURE: CPT | Performed by: NURSE PRACTITIONER

## 2022-03-08 PROCEDURE — 83735 ASSAY OF MAGNESIUM: CPT | Performed by: NURSE PRACTITIONER

## 2022-03-08 PROCEDURE — 25000003 PHARM REV CODE 250: Performed by: NURSE PRACTITIONER

## 2022-03-08 PROCEDURE — 99900035 HC TECH TIME PER 15 MIN (STAT)

## 2022-03-08 PROCEDURE — 80048 BASIC METABOLIC PNL TOTAL CA: CPT | Performed by: NURSE PRACTITIONER

## 2022-03-08 RX ORDER — LANOLIN ALCOHOL/MO/W.PET/CERES
400 CREAM (GRAM) TOPICAL 2 TIMES DAILY
Status: COMPLETED | OUTPATIENT
Start: 2022-03-08 | End: 2022-03-10

## 2022-03-08 RX ORDER — SODIUM BICARBONATE 650 MG/1
650 TABLET ORAL ONCE
Status: COMPLETED | OUTPATIENT
Start: 2022-03-08 | End: 2022-03-08

## 2022-03-08 RX ADMIN — Medication 400 MG: at 09:03

## 2022-03-08 RX ADMIN — FLUTICASONE FUROATE AND VILANTEROL TRIFENATATE 1 PUFF: 100; 25 POWDER RESPIRATORY (INHALATION) at 09:03

## 2022-03-08 RX ADMIN — Medication 100 MG: at 09:03

## 2022-03-08 RX ADMIN — CHOLESTYRAMINE 4 G: 4 POWDER, FOR SUSPENSION ORAL at 09:03

## 2022-03-08 RX ADMIN — AMLODIPINE BESYLATE 10 MG: 10 TABLET ORAL at 09:03

## 2022-03-08 RX ADMIN — TICAGRELOR 90 MG: 90 TABLET ORAL at 09:03

## 2022-03-08 RX ADMIN — ALUMINUM HYDROXIDE, MAGNESIUM HYDROXIDE, AND DIMETHICONE 30 ML: 400; 400; 40 SUSPENSION ORAL at 05:03

## 2022-03-08 RX ADMIN — TRIAMCINOLONE ACETONIDE: 1 CREAM TOPICAL at 10:03

## 2022-03-08 RX ADMIN — PANTOPRAZOLE SODIUM 40 MG: 40 TABLET, DELAYED RELEASE ORAL at 09:03

## 2022-03-08 RX ADMIN — ASPIRIN 81 MG CHEWABLE TABLET 81 MG: 81 TABLET CHEWABLE at 09:03

## 2022-03-08 RX ADMIN — TIOTROPIUM BROMIDE INHALATION SPRAY 2 PUFF: 3.12 SPRAY, METERED RESPIRATORY (INHALATION) at 09:03

## 2022-03-08 RX ADMIN — SODIUM BICARBONATE 650 MG TABLET 650 MG: at 04:03

## 2022-03-08 RX ADMIN — ERGOCALCIFEROL 50000 UNITS: 1.25 CAPSULE ORAL at 09:03

## 2022-03-08 RX ADMIN — HYDROCODONE BITARTRATE AND ACETAMINOPHEN 1 TABLET: 5; 325 TABLET ORAL at 09:03

## 2022-03-08 RX ADMIN — METOPROLOL SUCCINATE 50 MG: 50 TABLET, EXTENDED RELEASE ORAL at 09:03

## 2022-03-08 RX ADMIN — Medication 1 CAPSULE: at 09:03

## 2022-03-08 RX ADMIN — LISINOPRIL 10 MG: 10 TABLET ORAL at 09:03

## 2022-03-08 RX ADMIN — NITROGLYCERIN 0.4 MG: 0.4 TABLET, ORALLY DISINTEGRATING SUBLINGUAL at 08:03

## 2022-03-08 RX ADMIN — ATORVASTATIN CALCIUM 80 MG: 20 TABLET, FILM COATED ORAL at 09:03

## 2022-03-08 RX ADMIN — GABAPENTIN 100 MG: 100 CAPSULE ORAL at 09:03

## 2022-03-08 RX ADMIN — TRIAMCINOLONE ACETONIDE: 1 CREAM TOPICAL at 09:03

## 2022-03-08 RX ADMIN — CHOLESTYRAMINE 4 G: 4 POWDER, FOR SUSPENSION ORAL at 10:03

## 2022-03-08 NOTE — PT/OT/SLP PROGRESS
Physical Therapy      Patient Name:  Marisol Kerr   MRN:  [id number]    Patient not seen today secondary to pt politely declined wanting to take a break today/fatigue, nsg clear for PT  . Will follow-up next PT session

## 2022-03-08 NOTE — PLAN OF CARE
SW met with pt, she confirmed she has spoken to NP and expressed concern over her dc date of 3/21. Pt also confirmed and it is written on white board, appt with interventional cardiology, Dr. Chappell, on 3/17 at 10:40am. Transportation to this appt has already been arranged. SW reassured pt that her concern yesterday of missing that appt should no longer worry her, as arrangements have been made for her to make this appt.    Pt requested SW update her dtg Marisol.    Kj Dwyer, CHAPARROW  PRN

## 2022-03-08 NOTE — PT/OT/SLP PROGRESS
Occupational Therapy      Patient Name:  Marisol Kerr   MRN:  [id number]    Patient not seen today secondary to polite refusal in A.M and P.M 2* to fatigue   . Will follow-up with OT POC     3/8/2022

## 2022-03-08 NOTE — PROGRESS NOTES
[facility]                                  Skilled Nursing Facility                   Progress Note     Admit Date: 2/21/2022  LUZ  TBD  Principal Problem:  NSTEMI (non-ST elevated myocardial infarction)   HPI obtained from patient interview and chart review     Chief Complaint: Re-evaluation of medical treatment and therapy status: Lab review, re-evaluation of renal function, hypomagnesemia    HPI:   Ms Kerr is a 76 year old female with PMHx of DM2, HTN, HLD, CKD, COPD on home O2 who presents SNF following hospitalization for NSTEMI.  Admission to SNF for secondary to weakness and debility.    Interval history: All of today's labs reviewed and are listed below.  BUN/creatinine 16/1.5 from 36/1.2, Mag 1.6.  24 hr vital sign ranges listed below.   Patient's weight elevated today, at 82.8 kg compared to yesterday's at 77.7 kg.  Patient states she had an episode of left arm pain that she has have the last 3 days, it was worse today so she took a nitroglycerin that relieved her pain.  She has a planned follow-up with Interventional Cardiology on 03/17.  Patient denies shortness of breath, abdominal discomfort, nausea, or vomiting.  Patient reports an adequate appetite.  Patient denies dysuria.  Patient reports having regular bowel movements.  Patient progessing with PT/OT. Continuing to follow and treat all acute and chronic conditions.  Phone conversation held with patient's daughter to provide care update.    Past Medical History: Patient has a past medical history of CAD (coronary artery disease), Cancer, CHF (congestive heart failure), Colitis, Colon cancer, COPD (chronic obstructive pulmonary disease), Decreased hearing, Diabetes mellitus, Diabetes mellitus, type 2, Hypercholesterolemia, Hypertension, Hypoxemia, Insomnia, Obesity, and Osteoarthritis.    Past Surgical History: Patient has a past surgical history that includes External ear  surgery; Colectomy; Cholecystectomy; Flexible sigmoidoscopy (N/A, 9/19/2019); Eye surgery; Hysterectomy; Coronary stent placement; Cardiac catheterization; Coronary angioplasty; and ANGIOGRAM, CORONARY, WITH LEFT HEART CATHETERIZATION (N/A, 2/10/2022).    Social History: Patient reports that she quit smoking about 16 years ago. Her smoking use included cigarettes. She started smoking about 57 years ago. She has a 150.00 pack-year smoking history. She has never used smokeless tobacco. She reports current alcohol use. She reports that she does not use drugs.    Family History: family history includes Febrile seizures in her mother; Heart failure in her father.    Allergies: Patient is allergic to iodinated contrast media and strawberries [strawberry].    ROS  Constitutional: Negative for fever   Eyes: Negative for blurred vision, double vision   Respiratory: Negative for cough, shortness of breath   Cardiovascular: Negative for chest pain, palpitations, and leg swelling.   Gastrointestinal: Negative for abdominal pain, constipation, diarrhea, nausea, vomiting.   Genitourinary:  Negative for dysuria, frequency   Musculoskeletal:  + generalized weakness.  Pain to bilateral heels when lying in bed   Skin: Negative for itching and rash.   Neurological: Negative for dizziness, headaches.   Psychiatric/Behavioral: Negative for depression. The patient is not nervous/anxious.      24 hour Vital Sign Range   Temp:  [97.5 °F (36.4 °C)-98.2 °F (36.8 °C)]   Pulse:  [59-85]   Resp:  [18-20]   BP: (129-148)/(64-68)   SpO2:  [97 %-99 %]     PEx  Constitutional: Patient appears debilitated.  No distress noted  HENT:   Head: Normocephalic and atraumatic.   Eyes: Pupils are equal, round  Neck: Normal range of motion. Neck supple.   Cardiovascular: Normal rate, regular rhythm and normal heart sounds.    Pulmonary/Chest: Effort normal and breath sounds are clear with diminished bases.  Supplemental oxygen in progress  Abdominal: Soft.  Bowel sounds are normal.   Musculoskeletal: Normal range of motion.   Neurological: Alert and oriented to person, place, and time.   Psychiatric: Normal mood and affect. Behavior is normal.   Skin: Skin is warm and dry.  PVD changes to bilateral lower extremities    Recent Labs   Lab 03/08/22  0331      K 4.8   *   CO2 21*   BUN 46*   CREATININE 1.5*   MG 1.6       Recent Labs   Lab 03/08/22  0331   WBC 5.38   RBC 2.98*   HGB 8.6*   HCT 28.3*      MCV 95   MCH 28.9   MCHC 30.4*         Assessment and Plan:    Acute on chronic diastolic congestive heart failure  - TTE notable for Grade II left ventricular diastolic dysfunction, EF 65%  - admitted with Lasix 40 mg BID, lisinopril 10 mg daily  - Cardiac diet with Fluid restriction at 1.5L with strict I/Os and daily STANDING weights  - 3/8 initiated Lasix 40 mg daily    Hypomagnesemia  - initiated magnesium oxide 400 mg BID x2 days    NSTEMI (non-ST elevated myocardial infarction)  Coronary artery disease,  multivessel with history of previous PCI  - Recent hx of increasing episodes of angina requiring more frequent Nitroglycerin use and underwent LHC at OSH.   -Recent Left heart cath[02/11/22] showed:  · The RPAV lesion was 100% stenosed.  · The RPDA lesion was 100% stenosed.  · The Prox Cx to Mid Cx lesion was 80% stenosed.  · The 1st LPL lesion was 90% stenosed.  · The Prox RCA-2 lesion was 70% stenosed.  · The Prox RCA-1 lesion was 90% stenosed.  · The Mid LAD-2 lesion was 60% stenosed.  - continue 81 mg ASA qd, Atorvastatin 80mg qhs, Metoprolol 50 mg XL, Ticagrelor 90mg bid  - LHC deferred given rectus sheath hematoma. Plan for IC intervention 1-2 weeks after admission for outpatient.  Coronary atherosclerosis  - 3/8 required dose of nitroglycerin today, symptom resolve min after 1 administration    CKD (chronic kidney disease), stage III  - Baseline sCr 1.5- 1.8   - stable, continue to monitor twice weekly BMPs, avoid nephrotoxic agents, renally  dose medications when appropriate    Essential hypertension, benign  - home regimen with amlodipine 5 mg daily, benazepril 40 mg daily  - continue amlodipine 10 mg daily, lisinopril 10 mg daily, metoprolol XL 50 mg daily  - 3/4 resumed lisinopril 10 mg daily that was on hold for ISSA that is now resolved    COPD/emphysema  - Known history of severe COPD (GOLD IV D PFT 2020). On home oxygen (2-3L)  - Home medications: albuterol (VENTOLIN HFA) 90 mcg/actuation inhaler, ipratropium-albuteroL (COMBIVENT RESPIMAT)  mcg/actuation inhaler,  umeclidinium (INCRUSE ELLIPTA) 62.5 mcg/actuation inhalation capsule, fluticasone-salmeterol diskus inhaler 250-50 mcg  - continue Breo and Spiriva inhaler   - DuoNebs PRN    DM type 2, controlled, with complication  - last A1c 5.2 on 01/27/2022  - diabetic diet, Accu-Cheks AC/HS  - continue LDSSI PRN     Dysuria  - UA with reflex ordered    Gastroesophageal reflux disease without esophagitis  - continue Protonix 40 mg daily    Hypercholesterolemia  - continue Atorvastatin 40mg    Obesity  - BMI currently 33.94  - RD following, weight loss encouraged    Rectus sheath hematoma  - Patient had severe abdominal pain the day before with a mass on the abdomen. Patient was unable to have a BM yesterday. KUB was drawn and showed a large stool burden. Continued bowel regimen but pain was persistent. CT abdomen without contrast and lactic drawn. CT abdomen showed rectal sheath hematoma extending to the pelvis. IR consulted and recommended CTA of the abdomen to identify bleeding vessel. Due to kidney function, held off on CTA for concerns of contrast induced nephropathy as patient had a Alfredo score of 16 with 100cc of contrast, 15 with 75 cc contrast. IR consulted and following. Hgb went from 13 on admission --> 10.6 --> 9.9 --> 8.9. hematoma was expanding. IR performed embolization of inferior epigastric and collateral vessels.   - continue to monitor abdominal mass     Venous stasis  -  Chronic history of venous stasis of bilateral lower extremities limited to breakdown of skin without varicose veins  - Patient denies increase in leg swelling over her baseline  - wound care RN following    Vitamin-D deficiency  - continue ergocalciferol 95056 units weekly    Debility   - Continue with PT/OT for gait training and strengthening and restoration of ADL's   - Encourage mobility, OOB in chair, and early ambulation as appropriate  - Fall precautions   - Monitor for bowel and bladder dysfunction  - Monitor for and prevent skin breakdown and pressure ulcers  - Continue DVT prophylaxis with ASA/ticagrelor, frequent ambulation         Anticipate disposition:  Home with home health      Follow-up needed during SNF stay- interventional cardiology (3/17)    Follow-up needed after discharge from SNF: PCP,     Future Appointments   Date Time Provider Department Center   3/17/2022 10:40 AM Scott Chappell MD [facility] JEYSON Muniz [facility]   4/27/2022 11:20 AM Antonieta Marquez NP [facility]       Natalie Clemons NP  Department of Hospital Medicine   [facility]     DOS: 3/8/2022       Patient note was created using MModal Dictation.  Any errors in syntax or even information may not have been identified and edited on initial review prior to signing this note.

## 2022-03-09 PROCEDURE — 27000221 HC OXYGEN, UP TO 24 HOURS

## 2022-03-09 PROCEDURE — 97535 SELF CARE MNGMENT TRAINING: CPT | Mod: CO

## 2022-03-09 PROCEDURE — 25000003 PHARM REV CODE 250: Performed by: NURSE PRACTITIONER

## 2022-03-09 PROCEDURE — 94761 N-INVAS EAR/PLS OXIMETRY MLT: CPT

## 2022-03-09 PROCEDURE — 11000004 HC SNF PRIVATE

## 2022-03-09 PROCEDURE — 25000003 PHARM REV CODE 250: Performed by: HOSPITALIST

## 2022-03-09 PROCEDURE — 97110 THERAPEUTIC EXERCISES: CPT | Mod: CQ

## 2022-03-09 PROCEDURE — 97116 GAIT TRAINING THERAPY: CPT | Mod: CQ

## 2022-03-09 RX ORDER — SIMETHICONE 80 MG
1 TABLET,CHEWABLE ORAL 3 TIMES DAILY PRN
Status: DISCONTINUED | OUTPATIENT
Start: 2022-03-09 | End: 2022-03-16 | Stop reason: HOSPADM

## 2022-03-09 RX ORDER — FUROSEMIDE 40 MG/1
40 TABLET ORAL DAILY
Status: DISCONTINUED | OUTPATIENT
Start: 2022-03-09 | End: 2022-03-16 | Stop reason: HOSPADM

## 2022-03-09 RX ADMIN — CHOLESTYRAMINE 4 G: 4 POWDER, FOR SUSPENSION ORAL at 10:03

## 2022-03-09 RX ADMIN — Medication 1 CAPSULE: at 11:03

## 2022-03-09 RX ADMIN — ATORVASTATIN CALCIUM 80 MG: 20 TABLET, FILM COATED ORAL at 09:03

## 2022-03-09 RX ADMIN — FUROSEMIDE 40 MG: 40 TABLET ORAL at 11:03

## 2022-03-09 RX ADMIN — ASPIRIN 81 MG CHEWABLE TABLET 81 MG: 81 TABLET CHEWABLE at 10:03

## 2022-03-09 RX ADMIN — Medication 100 MG: at 10:03

## 2022-03-09 RX ADMIN — METOPROLOL SUCCINATE 50 MG: 50 TABLET, EXTENDED RELEASE ORAL at 10:03

## 2022-03-09 RX ADMIN — TICAGRELOR 90 MG: 90 TABLET ORAL at 09:03

## 2022-03-09 RX ADMIN — TICAGRELOR 90 MG: 90 TABLET ORAL at 10:03

## 2022-03-09 RX ADMIN — Medication 400 MG: at 09:03

## 2022-03-09 RX ADMIN — AMLODIPINE BESYLATE 10 MG: 10 TABLET ORAL at 10:03

## 2022-03-09 RX ADMIN — Medication 400 MG: at 10:03

## 2022-03-09 RX ADMIN — CHOLESTYRAMINE 4 G: 4 POWDER, FOR SUSPENSION ORAL at 09:03

## 2022-03-09 RX ADMIN — GABAPENTIN 100 MG: 100 CAPSULE ORAL at 09:03

## 2022-03-09 RX ADMIN — TRIAMCINOLONE ACETONIDE: 1 CREAM TOPICAL at 10:03

## 2022-03-09 RX ADMIN — PANTOPRAZOLE SODIUM 40 MG: 40 TABLET, DELAYED RELEASE ORAL at 10:03

## 2022-03-09 RX ADMIN — TIOTROPIUM BROMIDE INHALATION SPRAY 2 PUFF: 3.12 SPRAY, METERED RESPIRATORY (INHALATION) at 11:03

## 2022-03-09 RX ADMIN — FLUTICASONE FUROATE AND VILANTEROL TRIFENATATE 1 PUFF: 100; 25 POWDER RESPIRATORY (INHALATION) at 11:03

## 2022-03-09 RX ADMIN — LISINOPRIL 10 MG: 10 TABLET ORAL at 10:03

## 2022-03-09 RX ADMIN — TRIAMCINOLONE ACETONIDE: 1 CREAM TOPICAL at 09:03

## 2022-03-09 NOTE — PT/OT/SLP PROGRESS
"Physical Therapy Treatment    Patient Name:  Marisol Kerr   MRN:  [id number]  Admit Date: 2/21/2022  Admitting Diagnosis: NSTEMI (non-ST elevated myocardial infarction)  Recent Surgeries: N/A    General Precautions: Standard, fall   Orthopedic Precautions:N/A   Braces:   N/A    Recommendations:     Discharge Recommendations:  home health PT   Level of Assistance Recommended at Discharge: Intermittent assistance   Discharge Equipment Recommendations: none   Barriers to discharge: Decreased caregiver support    Assessment:     Marisol Kerr is a 76 y.o. female admitted with a medical diagnosis of NSTEMI (non-ST elevated myocardial infarction) . requiring light assistance and verbal cues for transfers and gait to prevent falls due to weakness, SOB, fatigue and pain.  Pt remains motivated to participate in PT session and will cont to benefit from skilled PT intervention..      Performance deficits affecting function:  weakness, impaired endurance, impaired functional mobilty, gait instability, impaired cardiopulmonary response to activity, edema .    Rehab Potential is good    Activity Tolerance: Fair    Plan:     Patient to be seen 6 x/week to address the above listed problems via gait training, therapeutic activities, therapeutic exercises, neuromuscular re-education, wheelchair management/training    · Plan of Care Expires: 03/21/22  · Plan of Care Reviewed with: patient    Subjective     "I don't know how much I'll be able to do".     Pain/Comfort:  · Pain Rating 1: 0/10  · Location - Side 1: Bilateral  · Location - Orientation 1: generalized  · Location 1: hip (when walking)  · Pain Addressed 1: Reposition, Distraction, Cessation of Activity  · Pain Rating Post-Intervention 1:  (no rating provided)    Patient's cultural, spiritual, Yazidi conflicts given the current situation:  · no    Objective:     Communicated with nurse (Leti) prior to session.  Patient found up in chair with oxygen (2 LPM) upon PT entry to " room.     Therapeutic Activities and Exercises: Pt performs B LE AROM ther ex's while seated in chair x15 reps with vc's    Functional Mobility:  · Transfers:     · Sit to Stand:  CGA to SBA with rolling walker  · Gait: within room 2* pt declines going to gym.  RW CGA to SBA for balance and safety 135ft and then 95ft with prolonged seated rest break for recovery.  Pt demonstrates FFP, tendency to keep AD away from body and mild instability    AM-PAC 6 CLICK MOBILITY  18    Patient left up in chair with all lines intact, call button in reach and nurse notified     GOALS:   Multidisciplinary Problems     Physical Therapy Goals        Problem: Physical Therapy Goal    Goal Priority Disciplines Outcome Goal Variances Interventions   Physical Therapy Goal     PT, PT/OT Ongoing, Progressing     Description: Goals to be met by: 3/14/22    Patient will increase functional independence with mobility by performing:    . Supine to sit with [place]  . Sit to supine with [place]  . Rolling to Left and Right with [place].  . Sit to stand transfer with Supervision-met 3/3/22  . Bed to chair transfer with Supervision using Rolling Walker  . Gait  x 100 feet with Supervision using Rolling Walker.   . Wheelchair propulsion x50 feet with Supervision using bilateral uppper extremities  . Ascend/Descend 4 inch curb step with Contact Guard Assistance using Rolling Walker.-met 3/3/22  . Retrieve object off floor with RW and reacher and SBA.                     Time Tracking:     PT Received On: 03/09/22  PT Total Time (min):   34 min    Billable Minutes: Gait Training 22 and Therapeutic Exercise 12    Treatment Type: Treatment  PT/PTA: PTA     PTA Visit Number: 3     03/09/2022

## 2022-03-09 NOTE — PLAN OF CARE
Problem: Adult Inpatient Plan of Care  Goal: Plan of Care Review  Outcome: Ongoing, Progressing  Flowsheets (Taken 3/8/2022 1800)  Plan of Care Reviewed With: patient     Problem: Diabetes Comorbidity  Goal: Blood Glucose Level Within Targeted Range  Outcome: Ongoing, Progressing     Problem: Fall Injury Risk  Goal: Absence of Fall and Fall-Related Injury  Outcome: Ongoing, Progressing     Problem: Skin Injury Risk Increased  Goal: Skin Health and Integrity  Outcome: Ongoing, Progressing     Problem: Impaired Wound Healing  Goal: Optimal Wound Healing  Outcome: Ongoing, Progressing

## 2022-03-09 NOTE — PROGRESS NOTES
[facility] - Skilled Nursing  Adult Nutrition  Progress Note    SUMMARY       Recommendations     1. Continue Cardiac diet.  Goals: 1. Pt's intake meals >75% by RD follow up.  Nutrition Goal Status: goal met  Communication of RD Recs:  (POC)    Assessment and Plan   Nutrition Problem  Nutrition-related knowledge deficit     Related to (etiology):   Heart healthy diet     Signs and Symptoms (as evidenced by):   Pt is trying to follow heart healthy diet at home, had a number of questions about her diet.     Interventions(treatment strategy):  Nutrition education: low sodium diet  Sodium controlled diet     Nutrition Diagnosis Status:   Continues      Malnutrition Assessment 2/24/22                 Orbital Region (Subcutaneous Fat Loss): well nourished  Upper Arm Region (Subcutaneous Fat Loss): well nourished  Thoracic and Lumbar Region: well nourished   [place] Region (Muscle Loss): well nourished  Clavicle Bone Region (Muscle Loss): well nourished  Clavicle and Acromion Bone Region (Muscle Loss): well nourished  Scapular Bone Region (Muscle Loss): well nourished  Dorsal Hand (Muscle Loss): well nourished  Patellar Region (Muscle Loss): well nourished  Anterior Thigh Region (Muscle Loss): well nourished  Posterior Calf Region (Muscle Loss): well nourished   Edema (Fluid Accumulation): 1-->trace (calves)             Reason for Assessment    Reason For Assessment: RD follow-up  Diagnosis: cardiac disease (NSTEMI)  Relevant Medical History: COPD, DM2, HTN, CAD, Colon Ca  Interdisciplinary Rounds: attended  General Information Comments: Pt continues with % intake meals. Weight continues to fluctuate with fluid shifts. Pt educated previously on low sodium diet and questions answered. Pt nourished per NFPE 2/22.  Nutrition Discharge Planning: Cardiac healthy, carbohydrate consistent diet    Nutrition Risk Screen    Nutrition Risk Screen: no indicators present    Nutrition/Diet History    Patient Reported  "Diet/Restrictions/Preferences: heart healthy  Typical Food/Fluid Intake: pt makes all her own meals and tries to eat heart healthy.  Food Preferences: NO FISH  Spiritual, Cultural Beliefs, Restorationism Practices, Values that Affect Care: no ([facility])  Food Allergies:  (strawberry)    Anthropometrics    Temp: 97.5 °F (36.4 °C)  Height Method: Stated  Height: 5' 3" (160 cm)  Height (inches): 63 in  Weight Method: Standard Scale  Weight: 82.9 kg (182 lb 12.2 oz)  Weight (lb): 182.76 lb  Ideal Body Weight (IBW), Female: 115 lb  % Ideal Body Weight, Female (lb): 161.99 %  BMI (Calculated): 32.4       Lab/Procedures/Meds    Pertinent Labs Reviewed: reviewed  Pertinent Labs Comments: Cl 113, BUN 46, Cre 1.5, eGFR 34.1, A1c 5.2%  Pertinent Medications Reviewed: reviewed  Pertinent Medications Comments: psyllium husk, coenzyme Q10, vitamin D, gabapentin, protonix       Estimated/Assessed Needs    Weight Used For Calorie Calculations: 84.5 kg (186 lb 4.6 oz)  Energy Calorie Requirements (kcal): 1445 kcal  Energy Need Method: Johnston-St Jeor (PAL 1.10)  Protein Requirements: 76-93g  Weight Used For Protein Calculations: 84.5 kg (186 lb 4.6 oz)        RDA Method (mL): 1445  CHO Requirement: 181g      Nutrition Prescription Ordered    Current Diet Order: Cardiac  Oral Nutrition Supplement: Boost Glucose Control daily    Evaluation of Received Nutrient/Fluid Intake    Comments: last BM 3/07  % Intake of Estimated Energy Needs: 75 - 100 %  % Meal Intake: 75 - 100 %    Nutrition Risk    Level of Risk/Frequency of Follow-up: low     Monitor and Evaluation    Food and Nutrient Intake: energy intake, food and beverage intake  Food and Nutrient Adminstration: diet order  Knowledge/Beliefs/Attitudes: food and nutrition knowledge/skill  Anthropometric Measurements: weight, weight change  Biochemical Data, Medical Tests and Procedures: electrolyte and renal panel, gastrointestinal profile, glucose/endocrine profile, inflammatory profile, " lipid profile  Nutrition-Focused Physical Findings: overall appearance, extremities, muscles and bones, head and eyes, skin     Nutrition Follow-Up    RD Follow-up?: Yes

## 2022-03-09 NOTE — PT/OT/SLP PROGRESS
"Occupational Therapy   Treatment    Name: Marisol Kerr  MRN: [id number]  Admit Date: 2/21/2022  Admitting Diagnosis:  NSTEMI (non-ST elevated myocardial infarction)    General Precautions: Standard, fall   Orthopedic Precautions:N/A   Braces:       Recommendations:     Discharge Recommendations: home health OT  Level of Assistance Recommended at Discharge: Intermittent assistance for ADL's and homemaking tasks  Discharge Equipment Recommendations:  none (Pt has BSC, shower chair, grab bars, a RW, and a transport chair.)  Barriers to discharge:  Decreased caregiver support    Assessment:     Marisol Kerr is a 76 y.o. female with a medical diagnosis of NSTEMI (non-ST elevated myocardial infarction) . Performance deficits affecting function are weakness, impaired endurance, impaired self care skills, impaired functional mobility, gait instability, impaired balance, decreased coordination, decreased lower extremity function, decreased ROM, impaired skin, impaired cardiopulmonary response to activity. Pt. participated well with session on this day. Pt is progressing well with session on this day still continues to requires cues with aspects of safety . Pt. Will continue to benefit from continued OT to progress towards goals    Rehab Potential is good    Activity tolerance:  Good    Plan:     Patient to be seen 6 x/week to address the above listed problems via self-care/home management, therapeutic activities, therapeutic exercises    · Plan of Care Expires: 03/22/22  · Plan of Care Reviewed with: patient    Subjective     Communicated with: latasha prior to session. "Goodmorning, I feel a lot better than yesterday"    Pain/Comfort:  Pain Rating 1: 0/10  Location - Side 1: Bilateral  Location - Orientation 1: generalized  Location 1: chest  Pain Addressed 1: Pre-medicate for activity  Pain Rating Post-Intervention 1: 0/10    Patient's cultural, spiritual, Jewish conflicts given the current situation:  no " ([facility])    Objective:     Patient found HOB elevated with oxygen upon OT entry to room.    Bed Mobility:    · Patient completed Rolling/Turning to Right with stand by assistance  · Patient completed Scooting/Bridging with stand by assistance  · Patient completed Supine to Sit with stand by assistance     Functional Mobility/Transfers:  · Patient completed Sit <> Stand Transfer with stand by assistance  with  rolling walker   · Patient completed Bed <> Chair Transfer using Step Transfer technique with stand by assistance with rolling walker  · Functional Mobility: Pt. With fxl mobility throughout room and bathroom with RW and SBA. No LOB noted    Activities of Daily Living:  · Grooming: modified independence with groomin aspects while seated  · Bathing: minimum assistance with cleaning of BLEs while seated at sink level   · Upper Body Dressing: modified independence to doff/beth pullover shirt  · Lower Body Dressing: supervision to doff/beth pants and BLE socks with use of hip kit   · Toileting: stand by assistance with hygiene and clothing management     [facility] 6 Click ADL: 22    Treatment & Education:  Pt. Educated on safety with ADL tasks as well as functional mobility and need for staff assist.     Patient left up in chair with all lines intact and call button in reachEducation:      GOALS:   Multidisciplinary Problems     Occupational Therapy Goals        Problem: Occupational Therapy Goal    Goal Priority Disciplines Outcome Interventions   Occupational Therapy Goal     OT, PT/OT Ongoing, Progressing    Description: Goals to be met by: 3/8/22     Patient will increase functional independence with ADLs by performing:    UE Dressing with Modified [place]. MET  LE Dressing with Supervision. Using hip kit to perform LBD. =MET  Grooming while seated at sink with Modified [place]. MET  UPDATED: Grooming while standing at the sink with Supervision.   Toileting from toilet or BSC with Supervision for  hygiene and clothing management.   Bathing from  sitting at sink with Minimal Assistance with (A) to wash distal LEs  Supine to sit with Modified [place].  Stand pivot transfers with Supervision with RW to steady.   Upper extremity exercise with UBE  for 10 minutes to increase functional endurance with supervision. MET  Caregiver will be educated on level of assist required to safely perform self care tasks and functional transfers..                      Time Tracking:     OT Date of Treatment: OT Date of Treatment: 03/09/22  OT Total Time (min): Total Time (min): 41 min    Billable Minutes:Self Care/Home Management 41    3/9/2022   OT and TYLER have discussed the above patients goals and status in collaboration with Plan of Care.

## 2022-03-09 NOTE — NURSING
Called to room 329 per pt. Pt stated she was having chest pain. VS taken and nitriglycerine admistered. After further investigation; pt stated she tried to pull herself up in the bed and this is when the sternal pain began. Sternal pain did not radiate to lt jaw/arm. Pt was reevaluated after 5 minutes. No chest pain noted. Will continue to monitor.

## 2022-03-10 ENCOUNTER — LAB VISIT (OUTPATIENT)
Dept: LAB | Facility: OTHER | Age: 77
End: 2022-03-10
Payer: MEDICARE

## 2022-03-10 DIAGNOSIS — Z20.822 ENCOUNTER FOR LABORATORY TESTING FOR COVID-19 VIRUS: ICD-10-CM

## 2022-03-10 LAB
SARS-COV-2 RNA RESP QL NAA+PROBE: NOT DETECTED
SARS-COV-2- CYCLE NUMBER: NORMAL

## 2022-03-10 PROCEDURE — 97110 THERAPEUTIC EXERCISES: CPT | Mod: CO

## 2022-03-10 PROCEDURE — 99900035 HC TECH TIME PER 15 MIN (STAT)

## 2022-03-10 PROCEDURE — U0003 INFECTIOUS AGENT DETECTION BY NUCLEIC ACID (DNA OR RNA); SEVERE ACUTE RESPIRATORY SYNDROME CORONAVIRUS 2 (SARS-COV-2) (CORONAVIRUS DISEASE [COVID-19]), AMPLIFIED PROBE TECHNIQUE, MAKING USE OF HIGH THROUGHPUT TECHNOLOGIES AS DESCRIBED BY CMS-2020-01-R: HCPCS | Performed by: EMERGENCY MEDICINE

## 2022-03-10 PROCEDURE — 25000003 PHARM REV CODE 250: Performed by: NURSE PRACTITIONER

## 2022-03-10 PROCEDURE — 97535 SELF CARE MNGMENT TRAINING: CPT | Mod: CO

## 2022-03-10 PROCEDURE — 27000221 HC OXYGEN, UP TO 24 HOURS

## 2022-03-10 PROCEDURE — 97110 THERAPEUTIC EXERCISES: CPT | Mod: CQ

## 2022-03-10 PROCEDURE — 94761 N-INVAS EAR/PLS OXIMETRY MLT: CPT

## 2022-03-10 PROCEDURE — 97530 THERAPEUTIC ACTIVITIES: CPT | Mod: CO

## 2022-03-10 PROCEDURE — 97116 GAIT TRAINING THERAPY: CPT | Mod: CQ

## 2022-03-10 PROCEDURE — 25000003 PHARM REV CODE 250: Performed by: HOSPITALIST

## 2022-03-10 PROCEDURE — 11000004 HC SNF PRIVATE

## 2022-03-10 RX ADMIN — Medication 1 CAPSULE: at 12:03

## 2022-03-10 RX ADMIN — ASPIRIN 81 MG CHEWABLE TABLET 81 MG: 81 TABLET CHEWABLE at 09:03

## 2022-03-10 RX ADMIN — Medication 100 MG: at 08:03

## 2022-03-10 RX ADMIN — TICAGRELOR 90 MG: 90 TABLET ORAL at 08:03

## 2022-03-10 RX ADMIN — METOPROLOL SUCCINATE 50 MG: 50 TABLET, EXTENDED RELEASE ORAL at 09:03

## 2022-03-10 RX ADMIN — TRIAMCINOLONE ACETONIDE: 1 CREAM TOPICAL at 08:03

## 2022-03-10 RX ADMIN — FLUTICASONE FUROATE AND VILANTEROL TRIFENATATE 1 PUFF: 100; 25 POWDER RESPIRATORY (INHALATION) at 09:03

## 2022-03-10 RX ADMIN — TICAGRELOR 90 MG: 90 TABLET ORAL at 09:03

## 2022-03-10 RX ADMIN — TIOTROPIUM BROMIDE INHALATION SPRAY 2 PUFF: 3.12 SPRAY, METERED RESPIRATORY (INHALATION) at 09:03

## 2022-03-10 RX ADMIN — Medication 400 MG: at 09:03

## 2022-03-10 RX ADMIN — GABAPENTIN 100 MG: 100 CAPSULE ORAL at 08:03

## 2022-03-10 RX ADMIN — FUROSEMIDE 40 MG: 40 TABLET ORAL at 09:03

## 2022-03-10 RX ADMIN — TRIAMCINOLONE ACETONIDE: 1 CREAM TOPICAL at 09:03

## 2022-03-10 RX ADMIN — CHOLESTYRAMINE 4 G: 4 POWDER, FOR SUSPENSION ORAL at 08:03

## 2022-03-10 RX ADMIN — AMLODIPINE BESYLATE 10 MG: 10 TABLET ORAL at 09:03

## 2022-03-10 RX ADMIN — PANTOPRAZOLE SODIUM 40 MG: 40 TABLET, DELAYED RELEASE ORAL at 09:03

## 2022-03-10 RX ADMIN — CHOLESTYRAMINE 4 G: 4 POWDER, FOR SUSPENSION ORAL at 09:03

## 2022-03-10 RX ADMIN — ATORVASTATIN CALCIUM 80 MG: 20 TABLET, FILM COATED ORAL at 08:03

## 2022-03-10 NOTE — PLAN OF CARE
Problem: Occupational Therapy Goal  Goal: Occupational Therapy Goal  Description: Goals to be met by: 3/8/22     Patient will increase functional independence with ADLs by performing:    UE Dressing with Modified [place]. MET  LE Dressing with Supervision. Using hip kit to perform LBD. =MET  Grooming while seated at sink with Modified [place]. MET  UPDATED: Grooming while standing at the sink with Supervision.   Toileting from toilet or BSC with Supervision for hygiene and clothing management.   Bathing from  sitting at sink with Minimal Assistance with (A) to wash distal LEs  Supine to sit with Modified [place].  Stand pivot transfers with Supervision with RW to steady.   Upper extremity exercise with UBE  for 10 minutes to increase functional endurance with supervision. MET  Caregiver will be educated on level of assist required to safely perform self care tasks and functional transfers..     Outcome: Ongoing, Progressing

## 2022-03-10 NOTE — PT/OT/SLP PROGRESS
"Physical Therapy Treatment    Patient Name:  Marisol Kerr   MRN:  [id number]  Admit Date: 2/21/2022  Admitting Diagnosis: NSTEMI (non-ST elevated myocardial infarction)    General Precautions: Standard, fall   Orthopedic Precautions:N/A   Braces: N/A     Recommendations:     Discharge Recommendations:  home health PT   Level of Assistance Recommended at Discharge: Intermittent assistance   Discharge Equipment Recommendations: none   Barriers to discharge: Decreased caregiver support    Assessment:     Marisol Kerr is a 76 y.o. female admitted with a medical diagnosis of NSTEMI (non-ST elevated myocardial infarction) .     Pt tolerated today's therapy session well. Pt completed all activities with occasional rest breaks. Pt ambulated 70ft + 70ft with RW and CGA, 1 seated rest break and no LOB. Pt is progressing well and continues to benefit from therapy to improve functional endurance, mobility, and strength.     Performance deficits affecting function:  weakness, impaired endurance, impaired functional mobilty, gait instability, impaired cardiopulmonary response to activity, edema .    Rehab Potential is good    Activity Tolerance: Fair    Plan:     Patient to be seen 6 x/week to address the above listed problems via gait training, therapeutic activities, therapeutic exercises, neuromuscular re-education, wheelchair management/training    · Plan of Care Expires: 03/21/22  · Plan of Care Reviewed with: patient    Subjective     "I don't want to, but I will".     Pain/Comfort:  · Pain Rating 1: 0/10  · Pain Rating Post-Intervention 1: 0/10    Patient's cultural, spiritual, Protestant conflicts given the current situation:  · no    Objective:     Patient found HOB elevated with oxygen, PureWick upon PT entry to room.     Therapeutic Activities and Exercises:    Seated BLE therex x15 reps: LAQ, marching, heel/toe raises, hip add/abd  Patient educated on role of therapy, goals of session, and benefits of out of bed " mobility.   Instructed on use of call button and importance of calling nursing staff for assistance with mobility   Questions/concerns addressed within PTA scope of practice  Pt verbalized understanding.    Functional Mobility:  · Bed Mobility:     · Rolling Left:  stand by assistance with bed rails   · Rolling Right: stand by assistance with bed rails   · Supine to Sit: contact guard assistance   · Transfers:     · Sit to Stand:  contact guard assistance with rolling walker  · Bed to Chair: contact guard assistance with  no AD  using  Stand Pivot  · Gait: pt ambulated 70ft +70ft with RW and CGA. Pt demonstrated mildly unsteady gait with forward flexed posture, decrease esther, and narrow DOUG. 1 seated rest break and no LOB.    AM-PAC 6 CLICK MOBILITY  18    Patient left up in chair with call button in reach.    GOALS:   Multidisciplinary Problems     Physical Therapy Goals        Problem: Physical Therapy Goal    Goal Priority Disciplines Outcome Goal Variances Interventions   Physical Therapy Goal     PT, PT/OT Ongoing, Progressing     Description: Goals to be met by: 3/14/22    Patient will increase functional independence with mobility by performing:    . Supine to sit with [place]  . Sit to supine with [place]  . Rolling to Left and Right with [place].  . Sit to stand transfer with Supervision-met 3/3/22  . Bed to chair transfer with Supervision using Rolling Walker  . Gait  x 100 feet with Supervision using Rolling Walker.   . Wheelchair propulsion x50 feet with Supervision using bilateral uppper extremities  . Ascend/Descend 4 inch curb step with Contact Guard Assistance using Rolling Walker.-met 3/3/22  . Retrieve object off floor with RW and reacher and SBA.                     Time Tracking:     PT Received On: 03/10/22  PT Total Time (min):   43    Billable Minutes: Gait Training 25 and Therapeutic Exercise 18    Treatment Type: Treatment  PT/PTA: PTA     PTA Visit Number: 4      03/10/2022

## 2022-03-10 NOTE — PT/OT/SLP PROGRESS
"Occupational Therapy   Treatment    Name: Marisol Kerr  MRN: [id number]  Admit Date: 2/21/2022  Admitting Diagnosis:  NSTEMI (non-ST elevated myocardial infarction)    General Precautions: Standard, fall   Orthopedic Precautions:N/A   Braces:       Recommendations:     Discharge Recommendations: home health OT  Level of Assistance Recommended at Discharge: Intermittent assistance for ADL's and homemaking tasks  Discharge Equipment Recommendations:  none (Pt has BSC, shower chair, grab bars, a RW, and a transport chair.)  Barriers to discharge:  Decreased caregiver support    Assessment:     Marisol Kerr is a 76 y.o. female with a medical diagnosis of NSTEMI (non-ST elevated myocardial infarction) .  She presents with performance deficits affecting function are weakness, impaired endurance, impaired self care skills, impaired functional mobility, gait instability, impaired balance, decreased coordination, decreased lower extremity function, decreased ROM, impaired skin, impaired cardiopulmonary response to activity. Pt participated/tolerated tx well on today. Pt progressing well in session but still require cues for safety aspect. Pt will continue to benefit from skilled OT to improve towards goals    Rehab Potential is good    Activity tolerance:  Good    Plan:     Patient to be seen 6 x/week to address the above listed problems via self-care/home management, therapeutic activities, therapeutic exercises    · Plan of Care Expires: 03/22/22  · Plan of Care Reviewed with: patient    Subjective     Communicated with: Rosmery prior to session. "Hello" .    Pain/Comfort:  Pain Rating 1: 0/10  Pain Rating Post-Intervention 1: 0/10    Patient's cultural, spiritual, Buddhism conflicts given the current situation:  no ([employer])    Objective:     Patient found up in chair with oxygen upon OT entry to room.    Bed Mobility:    · Pt seated in recliner     Functional Mobility/Transfers:  · Patient completed Sit <> Stand Transfer with " stand by assistance  with  rolling walker   · Functional Mobility: Not tested    Activities of Daily Living:  · Toileting: minimum assistance to perform diaper manangment in standing     [facility] 6 Click ADL: 22    OT Exercises: UE Ergometer . 15 minutes UE exercises performed to increase functional endurance and strength in order increase independence when performing functional activities .     Treatment & Education:  Pt. With standing act on this day with task. Pt. With SBA for balance aspects with task at raised counter. Pt performed visual perception task with placing clothing pins onto totem pole tolerating/completeting 5 min 40 sec's  with standing bal ,weight shifting and crossing mid line.    · Pt completed ADLs and func mobility activities for tx session as noted above    · Pt educated on role of OT and POC    Patient left up in chair with call button in reachEducation:      GOALS:   Multidisciplinary Problems     Occupational Therapy Goals        Problem: Occupational Therapy Goal    Goal Priority Disciplines Outcome Interventions   Occupational Therapy Goal     OT, PT/OT Ongoing, Progressing    Description: Goals to be met by: 3/8/22     Patient will increase functional independence with ADLs by performing:    UE Dressing with Modified [place]. MET  LE Dressing with Supervision. Using hip kit to perform LBD. =MET  Grooming while seated at sink with Modified [place]. MET  UPDATED: Grooming while standing at the sink with Supervision.   Toileting from toilet or BSC with Supervision for hygiene and clothing management.   Bathing from  sitting at sink with Minimal Assistance with (A) to wash distal LEs  Supine to sit with Modified [place].  Stand pivot transfers with Supervision with RW to steady.   Upper extremity exercise with UBE  for 10 minutes to increase functional endurance with supervision. MET  Caregiver will be educated on level of assist required to safely perform self care tasks and  functional transfers..                      Time Tracking:     OT Date of Treatment: OT Date of Treatment: 03/10/22  OT Total Time (min): Total Time (min): 38 min    Billable Minutes:Self Care/Home Management 8  Therapeutic Exercise 15  Therapeutic Activities 15    3/10/2022

## 2022-03-10 NOTE — PLAN OF CARE
Problem: Adult Inpatient Plan of Care  Goal: Absence of Hospital-Acquired Illness or Injury  Outcome: Ongoing, Progressing     Problem: Adult Inpatient Plan of Care  Goal: Optimal Comfort and Wellbeing  Outcome: Ongoing, Progressing     Problem: Diabetes Comorbidity  Goal: Blood Glucose Level Within Targeted Range  Outcome: Ongoing, Progressing

## 2022-03-10 NOTE — PLAN OF CARE
Interdisciplinary team, Mirian Stanley, RN Nurse Manager, Radha Rodriguez, RN Charge Nurse, García Marina, Unit Director, Alisa Nuñez, RN MDS Coordinator, Pasha Lechuga, OT, Eliane Long, Rehab/Activities, Zeinab Wagner, , and Marisol Davis, Dietician, spoke to patient for care plan conference, weekly status update, and therapy progress update. Tentative discharge date set for 3/21/22.

## 2022-03-11 LAB
ANION GAP SERPL CALC-SCNC: 6 MMOL/L (ref 8–16)
BASOPHILS # BLD AUTO: 0.03 K/UL (ref 0–0.2)
BASOPHILS NFR BLD: 0.6 % (ref 0–1.9)
BUN SERPL-MCNC: 45 MG/DL (ref 8–23)
CALCIUM SERPL-MCNC: 9 MG/DL (ref 8.7–10.5)
CHLORIDE SERPL-SCNC: 110 MMOL/L (ref 95–110)
CO2 SERPL-SCNC: 24 MMOL/L (ref 23–29)
CREAT SERPL-MCNC: 1.4 MG/DL (ref 0.5–1.4)
DIFFERENTIAL METHOD: ABNORMAL
EOSINOPHIL # BLD AUTO: 0.2 K/UL (ref 0–0.5)
EOSINOPHIL NFR BLD: 3.8 % (ref 0–8)
ERYTHROCYTE [DISTWIDTH] IN BLOOD BY AUTOMATED COUNT: 15 % (ref 11.5–14.5)
EST. GFR  (AFRICAN AMERICAN): 42.7 ML/MIN/1.73 M^2
EST. GFR  (NON AFRICAN AMERICAN): 37 ML/MIN/1.73 M^2
GLUCOSE SERPL-MCNC: 88 MG/DL (ref 70–110)
HCT VFR BLD AUTO: 28.3 % (ref 37–48.5)
HGB BLD-MCNC: 8.6 G/DL (ref 12–16)
IMM GRANULOCYTES # BLD AUTO: 0.01 K/UL (ref 0–0.04)
IMM GRANULOCYTES NFR BLD AUTO: 0.2 % (ref 0–0.5)
LYMPHOCYTES # BLD AUTO: 1 K/UL (ref 1–4.8)
LYMPHOCYTES NFR BLD: 19 % (ref 18–48)
MAGNESIUM SERPL-MCNC: 2 MG/DL (ref 1.6–2.6)
MCH RBC QN AUTO: 29.2 PG (ref 27–31)
MCHC RBC AUTO-ENTMCNC: 30.4 G/DL (ref 32–36)
MCV RBC AUTO: 96 FL (ref 82–98)
MONOCYTES # BLD AUTO: 0.5 K/UL (ref 0.3–1)
MONOCYTES NFR BLD: 9 % (ref 4–15)
NEUTROPHILS # BLD AUTO: 3.4 K/UL (ref 1.8–7.7)
NEUTROPHILS NFR BLD: 67.4 % (ref 38–73)
NRBC BLD-RTO: 0 /100 WBC
PHOSPHATE SERPL-MCNC: 2.8 MG/DL (ref 2.7–4.5)
PLATELET # BLD AUTO: 159 K/UL (ref 150–450)
PMV BLD AUTO: 12.3 FL (ref 9.2–12.9)
POTASSIUM SERPL-SCNC: 4.9 MMOL/L (ref 3.5–5.1)
RBC # BLD AUTO: 2.95 M/UL (ref 4–5.4)
SODIUM SERPL-SCNC: 140 MMOL/L (ref 136–145)
WBC # BLD AUTO: 4.99 K/UL (ref 3.9–12.7)

## 2022-03-11 PROCEDURE — 11000004 HC SNF PRIVATE

## 2022-03-11 PROCEDURE — 97535 SELF CARE MNGMENT TRAINING: CPT

## 2022-03-11 PROCEDURE — 27000221 HC OXYGEN, UP TO 24 HOURS

## 2022-03-11 PROCEDURE — 97110 THERAPEUTIC EXERCISES: CPT

## 2022-03-11 PROCEDURE — 99900035 HC TECH TIME PER 15 MIN (STAT)

## 2022-03-11 PROCEDURE — 25000003 PHARM REV CODE 250: Performed by: NURSE PRACTITIONER

## 2022-03-11 PROCEDURE — 94761 N-INVAS EAR/PLS OXIMETRY MLT: CPT

## 2022-03-11 PROCEDURE — 83735 ASSAY OF MAGNESIUM: CPT | Performed by: NURSE PRACTITIONER

## 2022-03-11 PROCEDURE — 97530 THERAPEUTIC ACTIVITIES: CPT

## 2022-03-11 PROCEDURE — 80048 BASIC METABOLIC PNL TOTAL CA: CPT | Performed by: NURSE PRACTITIONER

## 2022-03-11 PROCEDURE — 97116 GAIT TRAINING THERAPY: CPT

## 2022-03-11 PROCEDURE — 84100 ASSAY OF PHOSPHORUS: CPT | Performed by: NURSE PRACTITIONER

## 2022-03-11 PROCEDURE — 85025 COMPLETE CBC W/AUTO DIFF WBC: CPT | Performed by: NURSE PRACTITIONER

## 2022-03-11 PROCEDURE — 25000003 PHARM REV CODE 250: Performed by: HOSPITALIST

## 2022-03-11 PROCEDURE — 36415 COLL VENOUS BLD VENIPUNCTURE: CPT | Performed by: NURSE PRACTITIONER

## 2022-03-11 RX ADMIN — Medication 100 MG: at 08:03

## 2022-03-11 RX ADMIN — AMLODIPINE BESYLATE 10 MG: 10 TABLET ORAL at 08:03

## 2022-03-11 RX ADMIN — TICAGRELOR 90 MG: 90 TABLET ORAL at 08:03

## 2022-03-11 RX ADMIN — METOPROLOL SUCCINATE 50 MG: 50 TABLET, EXTENDED RELEASE ORAL at 08:03

## 2022-03-11 RX ADMIN — FLUTICASONE FUROATE AND VILANTEROL TRIFENATATE 1 PUFF: 100; 25 POWDER RESPIRATORY (INHALATION) at 08:03

## 2022-03-11 RX ADMIN — CHOLESTYRAMINE 4 G: 4 POWDER, FOR SUSPENSION ORAL at 10:03

## 2022-03-11 RX ADMIN — CHOLESTYRAMINE 4 G: 4 POWDER, FOR SUSPENSION ORAL at 08:03

## 2022-03-11 RX ADMIN — TIOTROPIUM BROMIDE INHALATION SPRAY 2 PUFF: 3.12 SPRAY, METERED RESPIRATORY (INHALATION) at 08:03

## 2022-03-11 RX ADMIN — Medication 1 CAPSULE: at 08:03

## 2022-03-11 RX ADMIN — TICAGRELOR 90 MG: 90 TABLET ORAL at 10:03

## 2022-03-11 RX ADMIN — ATORVASTATIN CALCIUM 80 MG: 20 TABLET, FILM COATED ORAL at 10:03

## 2022-03-11 RX ADMIN — FUROSEMIDE 40 MG: 40 TABLET ORAL at 08:03

## 2022-03-11 RX ADMIN — GABAPENTIN 100 MG: 100 CAPSULE ORAL at 10:03

## 2022-03-11 RX ADMIN — ASPIRIN 81 MG CHEWABLE TABLET 81 MG: 81 TABLET CHEWABLE at 08:03

## 2022-03-11 RX ADMIN — TRIAMCINOLONE ACETONIDE: 1 CREAM TOPICAL at 10:03

## 2022-03-11 RX ADMIN — LISINOPRIL 10 MG: 10 TABLET ORAL at 08:03

## 2022-03-11 RX ADMIN — PANTOPRAZOLE SODIUM 40 MG: 40 TABLET, DELAYED RELEASE ORAL at 08:03

## 2022-03-11 RX ADMIN — TRIAMCINOLONE ACETONIDE: 1 CREAM TOPICAL at 08:03

## 2022-03-11 NOTE — PROGRESS NOTES
[facility]                                  Skilled Nursing Facility                   Progress Note     Admit Date: 2/21/2022  LUZ  TBD  Principal Problem:  NSTEMI (non-ST elevated myocardial infarction)   HPI obtained from patient interview and chart review     Chief Complaint: Re-evaluation of medical treatment and therapy status: Lab review, re-evaluation of renal function    HPI:   Ms Kerr is a 76 year old female with PMHx of DM2, HTN, HLD, CKD, COPD on home O2 who presents SNF following hospitalization for NSTEMI.  Admission to SNF for secondary to weakness and debility.    Interval history: All of today's labs reviewed and are listed below.  BUN/creatinine 45/1.4.  24 hr vital sign ranges listed below.  Patient continues to complain of buttocks pain, skin breakdown noted, ordered for triad an wound care consult.  Patient denies abdominal discomfort, nausea, or vomiting.  Patient reports an adequate appetite.  Patient denies dysuria.  Patient reports having regular bowel movements.  Patient progessing with PT/OT- Gait: pt ambulated 70ft +70ft with RW and CGA. Pt demonstrated mildly unsteady gait with forward flexed posture, decrease esther, and narrow DOUG. 1 seated rest break and no LOB. Continuing to follow and treat all acute and chronic conditions.    Past Medical History: Patient has a past medical history of CAD (coronary artery disease), Cancer, CHF (congestive heart failure), Colitis, Colon cancer, COPD (chronic obstructive pulmonary disease), Decreased hearing, Diabetes mellitus, Diabetes mellitus, type 2, Hypercholesterolemia, Hypertension, Hypoxemia, Insomnia, Obesity, and Osteoarthritis.    Past Surgical History: Patient has a past surgical history that includes External ear surgery; Colectomy; Cholecystectomy; Flexible sigmoidoscopy (N/A, 9/19/2019); Eye surgery; Hysterectomy; Coronary stent placement; Cardiac  catheterization; Coronary angioplasty; and ANGIOGRAM, CORONARY, WITH LEFT HEART CATHETERIZATION (N/A, 2/10/2022).    Social History: Patient reports that she quit smoking about 16 years ago. Her smoking use included cigarettes. She started smoking about 57 years ago. She has a 150.00 pack-year smoking history. She has never used smokeless tobacco. She reports current alcohol use. She reports that she does not use drugs.    Family History: family history includes Febrile seizures in her mother; Heart failure in her father.    Allergies: Patient is allergic to iodinated contrast media and strawberries [strawberry].    ROS  Constitutional: Negative for fever   Eyes: Negative for blurred vision, double vision   Respiratory: Negative for cough, shortness of breath   Cardiovascular: Negative for chest pain, palpitations, and leg swelling.   Gastrointestinal: Negative for abdominal pain, constipation, diarrhea, nausea, vomiting.   Genitourinary:  Negative for dysuria, frequency   Musculoskeletal:  + generalized weakness.  Pain to bilateral heels when lying in bed   Skin: Negative for itching and rash.   Neurological: Negative for dizziness, headaches.   Psychiatric/Behavioral: Negative for depression. The patient is not nervous/anxious.      24 hour Vital Sign Range   Temp:  [98.1 °F (36.7 °C)-98.6 °F (37 °C)]   Pulse:  [60]   Resp:  [16-18]   BP: (123-148)/(56-62)   SpO2:  [97 %]     PEx  Constitutional: Patient appears debilitated.  No distress noted  HENT:   Head: Normocephalic and atraumatic.   Eyes: Pupils are equal, round  Neck: Normal range of motion. Neck supple.   Cardiovascular: Normal rate, regular rhythm and normal heart sounds.    Pulmonary/Chest: Effort normal and breath sounds are clear with diminished bases.  Supplemental oxygen in progress  Abdominal: Soft. Bowel sounds are normal.   Musculoskeletal: Normal range of motion.   Neurological: Alert and oriented to person, place, and time.   Psychiatric:  Normal mood and affect. Behavior is normal.   Skin: Skin is warm and dry.  PVD changes to bilateral lower extremities.  Skin breakdown to buttocks    Recent Labs   Lab 03/11/22  0507      K 4.9      CO2 24   BUN 45*   CREATININE 1.4   MG 2.0       Recent Labs   Lab 03/11/22  0507   WBC 4.99   RBC 2.95*   HGB 8.6*   HCT 28.3*      MCV 96   MCH 29.2   MCHC 30.4*         Assessment and Plan:    Acute on chronic diastolic congestive heart failure  - TTE notable for Grade II left ventricular diastolic dysfunction, EF 65%  - admitted with Lasix 40 mg BID, lisinopril 10 mg daily  - Cardiac diet with Fluid restriction at 1.5L with strict I/Os and daily STANDING weights  - continue Lasix 40 mg daily    Altered integrity  - initiated triad to gluteal cleft, ordered for wound care consult    NSTEMI (non-ST elevated myocardial infarction)  Coronary artery disease,  multivessel with history of previous PCI  - Recent hx of increasing episodes of angina requiring more frequent Nitroglycerin use and underwent LHC at OSH.   -Recent Left heart cath[02/11/22] showed:  · The RPAV lesion was 100% stenosed.  · The RPDA lesion was 100% stenosed.  · The Prox Cx to Mid Cx lesion was 80% stenosed.  · The 1st LPL lesion was 90% stenosed.  · The Prox RCA-2 lesion was 70% stenosed.  · The Prox RCA-1 lesion was 90% stenosed.  · The Mid LAD-2 lesion was 60% stenosed.  - continue 81 mg ASA qd, Atorvastatin 80mg qhs, Metoprolol 50 mg XL, Ticagrelor 90mg bid  - LHC deferred given rectus sheath hematoma. Plan for IC intervention 1-2 weeks after admission for outpatient.  Coronary atherosclerosis  - 3/8 required dose of nitroglycerin today, symptom resolve min after 1 administration    CKD (chronic kidney disease), stage III  - Baseline sCr 1.5- 1.8   - stable, continue to monitor twice weekly BMPs, avoid nephrotoxic agents, renally dose medications when appropriate    Essential hypertension, benign  - home regimen with amlodipine 5  mg daily, benazepril 40 mg daily  - continue amlodipine 10 mg daily, lisinopril 10 mg daily, metoprolol XL 50 mg daily  - 3/4 resumed lisinopril 10 mg daily that was on hold for ISSA that is now resolved    COPD/emphysema  - Known history of severe COPD (GOLD IV D PFT 2020). On home oxygen (2-3L)  - Home medications: albuterol (VENTOLIN HFA) 90 mcg/actuation inhaler, ipratropium-albuteroL (COMBIVENT RESPIMAT)  mcg/actuation inhaler,  umeclidinium (INCRUSE ELLIPTA) 62.5 mcg/actuation inhalation capsule, fluticasone-salmeterol diskus inhaler 250-50 mcg  - continue Breo and Spiriva inhaler   - DuoNebs PRN    DM type 2, controlled, with complication  - last A1c 5.2 on 01/27/2022  - diabetic diet, Accu-Cheks AC/HS  - continue LDSSI PRN     Dysuria  - UA with reflex ordered    Gastroesophageal reflux disease without esophagitis  - continue Protonix 40 mg daily    Hypercholesterolemia  - continue Atorvastatin 40mg    Obesity  - BMI currently 33.94  - RD following, weight loss encouraged    Rectus sheath hematoma  - Patient had severe abdominal pain the day before with a mass on the abdomen. Patient was unable to have a BM yesterday. KUB was drawn and showed a large stool burden. Continued bowel regimen but pain was persistent. CT abdomen without contrast and lactic drawn. CT abdomen showed rectal sheath hematoma extending to the pelvis. IR consulted and recommended CTA of the abdomen to identify bleeding vessel. Due to kidney function, held off on CTA for concerns of contrast induced nephropathy as patient had a Alfredo score of 16 with 100cc of contrast, 15 with 75 cc contrast. IR consulted and following. Hgb went from 13 on admission --> 10.6 --> 9.9 --> 8.9. hematoma was expanding. IR performed embolization of inferior epigastric and collateral vessels.   - continue to monitor abdominal mass     Venous stasis  - Chronic history of venous stasis of bilateral lower extremities limited to breakdown of skin without  varicose veins  - Patient denies increase in leg swelling over her baseline  - wound care RN following    Vitamin-D deficiency  - continue ergocalciferol 56560 units weekly    Debility   - Continue with PT/OT for gait training and strengthening and restoration of ADL's   - Encourage mobility, OOB in chair, and early ambulation as appropriate  - Fall precautions   - Monitor for bowel and bladder dysfunction  - Monitor for and prevent skin breakdown and pressure ulcers  - Continue DVT prophylaxis with ASA/ticagrelor, frequent ambulation         Anticipate disposition:  Home with home health      Follow-up needed during SNF stay- interventional cardiology (3/17)    Follow-up needed after discharge from SNF: PCP,     Future Appointments   Date Time Provider Department Center   3/14/2022  7:00 AM [facility], [facility] SPEC LAB [facility] SPECLAB [facility]   3/17/2022 10:40 AM Scott Chappell MD [facility]   4/27/2022 11:20 AM Antonieta Marquez NP [facility]       Natalie Clemons NP  Department of Hospital Medicine   [facility]     DOS: 3/11/2022       Patient note was created using MModal Dictation.  Any errors in syntax or even information may not have been identified and edited on initial review prior to signing this note.

## 2022-03-11 NOTE — PT/OT/SLP PROGRESS
Occupational Therapy   Treatment    Name: Marisol Kerr  MRN: [id number]  Admit Date: 2/21/2022  Admitting Diagnosis:  NSTEMI (non-ST elevated myocardial infarction)    General Precautions: Standard, fall   Orthopedic Precautions:N/A   Braces: N/A     Recommendations:     Discharge Recommendations: home health OT  Level of Assistance Recommended at Discharge: Intermittent assistance for ADL's and homemaking tasks  Discharge Equipment Recommendations:  none (Pt has BSC, shower chair, grab bars, a RW, and a transport chair.)  Barriers to discharge:  Decreased caregiver support    Assessment:     Marisol Kerr is a 76 y.o. female with a medical diagnosis of NSTEMI (non-ST elevated myocardial infarction).  She presents with the following performance deficits affecting function are weakness, impaired endurance, impaired self care skills, impaired functional mobilty, gait instability, impaired balance, decreased lower extremity function, impaired cardiopulmonary response to activity, decreased upper extremity function, decreased coordination. Patient participated well with therapy, benefits from seated rest breaks and cues for safer rolling walker management with direction change and upright posture; will continue to benefit from skilled OT therapy and progressing towards goals.    Rehab Potential is good    Activity tolerance:  Good    Plan:     Patient to be seen 6 x/week to address the above listed problems via self-care/home management, therapeutic activities, therapeutic exercises    · Plan of Care Expires: 03/22/22  · Plan of Care Reviewed with: patient (patient)    Subjective   Communicated with: Nursing prior to session.    Pain/Comfort:  · Pain Rating 1: 0/10  · Pain Rating Post-Intervention 1: 0/10    Patient's cultural, spiritual, Yarsani conflicts given the current situation:  · no (Taoist)    Objective:   Patient found with PT  with oxygen upon OT entry to room.    Functional Mobility/Transfers:  · Patient  "completed Sit <> Stand Transfer with stand by assistance  with  rolling walker  from bedside chair x 2 trials   · Sit <> Stand transfer from toilet with CGA with rolling walker, cues for placement as patient seated crooked on toilet  · Functional Mobility: [place] by assist with rolling walker, min cues for RW management; due to fatigue patient attempting to move more quickly with RW far outside of DOUG  · Pt in static stand x 3 for ~2 min trials prior to stand > sit for improved UE placement and controlled descent    Activities of Daily Living:  · Grooming: stand by assistance to wash hands, with rolling walker  · Upper Body Dressing: stand by assistance seated in bedside chair to doff/don scrub top  · Lower Body Dressing: minimum assistance in standing to doff/don brief and scrub bottom to change size as patient uncomfortable following functional mobility from bathroom to bedside chair  · Toileting: minimum assistance to manage scrub pants behind back, patient reporting "these are too tight" and was unable to maintain grasp on walker while attempting without therpapist assist    AMPA 6 Click ADL: 21    Treatment & Education:  Pt provided further education to complete management of brief and pants in standing.   Completed ADL''s and functional mobility as outlined above.  Re-ed on rolling walker management with direction change.  Pt ed on roles/goals of OT and POC  Extensive education provided on energy conservation and safety    Patient left up in chair with all lines intact and call button in reachEducation:      GOALS:   Multidisciplinary Problems     Occupational Therapy Goals        Problem: Occupational Therapy Goal    Goal Priority Disciplines Outcome Interventions   Occupational Therapy Goal     OT, PT/OT Ongoing, Progressing    Description: Goals to be met by: 3/8/22     Patient will increase functional independence with ADLs by performing:    UE Dressing with Modified [place]. MET  LE Dressing with " Supervision. Using hip kit to perform LBD. =MET  Grooming while seated at sink with Modified [place]. MET  UPDATED: Grooming while standing at the sink with Supervision.   Toileting from toilet or BSC with Supervision for hygiene and clothing management.   Bathing from  sitting at sink with Minimal Assistance with (A) to wash distal LEs  Supine to sit with Modified [place].  Stand pivot transfers with Supervision with RW to steady.   Upper extremity exercise with UBE  for 10 minutes to increase functional endurance with supervision. MET  Caregiver will be educated on level of assist required to safely perform self care tasks and functional transfers..                      Time Tracking:     OT Date of Treatment: OT Date of Treatment: 03/11/22  OT Total Time (min): Total Time (min): 39 min    Billable Minutes:Self Care/Home Management 24  Therapeutic Activity 15    3/11/2022

## 2022-03-11 NOTE — PT/OT/SLP PROGRESS
"Physical Therapy Treatment    Patient Name:  Marisol Kerr   MRN:  [id number]  Admit Date: 2/21/2022  Admitting Diagnosis: NSTEMI (non-ST elevated myocardial infarction)  Recent Surgeries: N/A    General Precautions: Standard, fall   Orthopedic Precautions:N/A   Braces: N/A     Recommendations:     Discharge Recommendations:  home health PT   Level of Assistance Recommended at Discharge: Intermittent assistance   Discharge Equipment Recommendations: none   Barriers to discharge: Decreased caregiver support    Assessment:     Marisol Kerr is a 76 y.o. female admitted with a medical diagnosis of NSTEMI (non-ST elevated myocardial infarction) . Pt complaining of her breathing being "heavy"  and attributing it to the higher humidity today. Pt therefore needed extra time to complete all functional tasks and needed more frequent rest breaks compared to previous treatment sessions. Pt will benefit form continued snf rehab to help pt continue to improve her functional mobility.      Performance deficits affecting function:  weakness, impaired endurance, impaired self care skills, impaired functional mobilty, gait instability, impaired balance, decreased lower extremity function, impaired cardiopulmonary response to activity, edema .    Rehab Potential is good    Activity Tolerance: Good    Plan:     Patient to be seen 6 x/week to address the above listed problems via gait training, therapeutic activities, therapeutic exercises, neuromuscular re-education, wheelchair management/training    · Plan of Care Expires: 03/21/22  · Plan of Care Reviewed with: patient    Subjective     Pt agreeable and very motivated to participate with PT.     Pain/Comfort:  · Pain Rating 1: 0/10  · Pain Rating Post-Intervention 1: 0/10    Patient's cultural, spiritual, Cheondoism conflicts given the current situation:  · no    Objective:     Communicated with pt's nurse prior to session.  Patient found up in chair with oxygen upon PT entry to room. "     Therapeutic Activities and Exercises: Mini-eliptical x 15mins to help improve B L/e MMT and endurance. Resistance set where pt was working at a Modified [place] of 3.  Pt completed toileting with (S)    Functional Mobility:  · Transfers:     · Sit to Stand:  stand by assistance with rolling walker  · Toilet Transfer: stand by assistance with  rolling walker and grab bars  using  Stand Pivot  · Gait: ~50ft x 2 trials with RW and SBA for safety. pt needed a long seated rest break d.t fatigue and SOB.    AM-PAC 6 CLICK MOBILITY  18    Patient left seated on the toilet with direct hand off to OT.    GOALS:   Multidisciplinary Problems     Physical Therapy Goals        Problem: Physical Therapy Goal    Goal Priority Disciplines Outcome Goal Variances Interventions   Physical Therapy Goal     PT, PT/OT Ongoing, Progressing     Description: Goals to be met by: 3/14/22    Patient will increase functional independence with mobility by performing:    . Supine to sit with [place]  . Sit to supine with [place]  . Rolling to Left and Right with [place].  . Sit to stand transfer with Supervision-met 3/3/22  . Bed to chair transfer with Supervision using Rolling Walker  . Gait  x 100 feet with Supervision using Rolling Walker.   . Wheelchair propulsion x50 feet with Supervision using bilateral uppper extremities  . Ascend/Descend 4 inch curb step with Contact Guard Assistance using Rolling Walker.-met 3/3/22  . Retrieve object off floor with RW and reacher and SBA.                     Time Tracking:     PT Received On: 03/11/22  PT Total Time (min):     Billable Minutes: Gait Training 22mins, Therapeutic Activity 10mins and Therapeutic Exercise 15mins    Treatment Type: Treatment  PT/PTA: PT     PTA Visit Number: 0     03/11/2022

## 2022-03-12 LAB — POCT GLUCOSE: 161 MG/DL (ref 70–110)

## 2022-03-12 PROCEDURE — 25000003 PHARM REV CODE 250: Performed by: NURSE PRACTITIONER

## 2022-03-12 PROCEDURE — 25000003 PHARM REV CODE 250: Performed by: HOSPITALIST

## 2022-03-12 PROCEDURE — 97530 THERAPEUTIC ACTIVITIES: CPT | Mod: CQ

## 2022-03-12 PROCEDURE — 99900035 HC TECH TIME PER 15 MIN (STAT)

## 2022-03-12 PROCEDURE — 94761 N-INVAS EAR/PLS OXIMETRY MLT: CPT

## 2022-03-12 PROCEDURE — 25000242 PHARM REV CODE 250 ALT 637 W/ HCPCS: Performed by: NURSE PRACTITIONER

## 2022-03-12 PROCEDURE — 97116 GAIT TRAINING THERAPY: CPT | Mod: CQ

## 2022-03-12 PROCEDURE — 27000221 HC OXYGEN, UP TO 24 HOURS

## 2022-03-12 PROCEDURE — 11000004 HC SNF PRIVATE

## 2022-03-12 RX ADMIN — ASPIRIN 81 MG CHEWABLE TABLET 81 MG: 81 TABLET CHEWABLE at 09:03

## 2022-03-12 RX ADMIN — ATORVASTATIN CALCIUM 80 MG: 20 TABLET, FILM COATED ORAL at 08:03

## 2022-03-12 RX ADMIN — TIOTROPIUM BROMIDE INHALATION SPRAY 2 PUFF: 3.12 SPRAY, METERED RESPIRATORY (INHALATION) at 09:03

## 2022-03-12 RX ADMIN — CHOLESTYRAMINE 4 G: 4 POWDER, FOR SUSPENSION ORAL at 08:03

## 2022-03-12 RX ADMIN — FUROSEMIDE 40 MG: 40 TABLET ORAL at 09:03

## 2022-03-12 RX ADMIN — TICAGRELOR 90 MG: 90 TABLET ORAL at 09:03

## 2022-03-12 RX ADMIN — FLUTICASONE FUROATE AND VILANTEROL TRIFENATATE 1 PUFF: 100; 25 POWDER RESPIRATORY (INHALATION) at 09:03

## 2022-03-12 RX ADMIN — TRIAMCINOLONE ACETONIDE: 1 CREAM TOPICAL at 08:03

## 2022-03-12 RX ADMIN — TRIAMCINOLONE ACETONIDE: 1 CREAM TOPICAL at 09:03

## 2022-03-12 RX ADMIN — HYDROCODONE BITARTRATE AND ACETAMINOPHEN 1 TABLET: 5; 325 TABLET ORAL at 08:03

## 2022-03-12 RX ADMIN — CHOLESTYRAMINE 4 G: 4 POWDER, FOR SUSPENSION ORAL at 09:03

## 2022-03-12 RX ADMIN — Medication 100 MG: at 08:03

## 2022-03-12 RX ADMIN — AMLODIPINE BESYLATE 10 MG: 10 TABLET ORAL at 09:03

## 2022-03-12 RX ADMIN — Medication 6 MG: at 08:03

## 2022-03-12 RX ADMIN — PSYLLIUM HUSK 1 PACKET: 3.4 POWDER ORAL at 09:03

## 2022-03-12 RX ADMIN — GABAPENTIN 100 MG: 100 CAPSULE ORAL at 08:03

## 2022-03-12 RX ADMIN — Medication 1 CAPSULE: at 09:03

## 2022-03-12 RX ADMIN — LISINOPRIL 10 MG: 10 TABLET ORAL at 09:03

## 2022-03-12 RX ADMIN — TICAGRELOR 90 MG: 90 TABLET ORAL at 08:03

## 2022-03-12 RX ADMIN — PANTOPRAZOLE SODIUM 40 MG: 40 TABLET, DELAYED RELEASE ORAL at 09:03

## 2022-03-12 RX ADMIN — METOPROLOL SUCCINATE 50 MG: 50 TABLET, EXTENDED RELEASE ORAL at 09:03

## 2022-03-12 NOTE — PLAN OF CARE
Problem: Adult Inpatient Plan of Care  Goal: Plan of Care Review  Outcome: Ongoing, Progressing  Goal: Optimal Comfort and Wellbeing  Outcome: Ongoing, Progressing     Problem: Diabetes Comorbidity  Goal: Blood Glucose Level Within Targeted Range  Outcome: Ongoing, Progressing     Problem: Fall Injury Risk  Goal: Absence of Fall and Fall-Related Injury  Outcome: Ongoing, Progressing     Problem: Respiratory Compromise (Heart Failure)  Goal: Effective Oxygenation and Ventilation  Outcome: Ongoing, Progressing

## 2022-03-12 NOTE — PT/OT/SLP PROGRESS
"Physical Therapy Treatment    Patient Name:  Marisol Kerr   MRN:  [id number]  Admit Date: 2/21/2022  Admitting Diagnosis: NSTEMI (non-ST elevated myocardial infarction)  Recent Surgeries:     General Precautions: Standard, fall   Orthopedic Precautions:N/A   Braces:       Recommendations:     Discharge Recommendations:  home health PT   Level of Assistance Recommended at Discharge: Intermittent assistance   Discharge Equipment Recommendations: none   Barriers to discharge: Decreased caregiver support    Assessment:     Marisol Kerr is a 76 y.o. female admitted with a medical diagnosis of NSTEMI (non-ST elevated myocardial infarction) . Pt tolerated well, pt would continue to benefit from skilled PT services to improve overall functional mobility, strength and endurance.  .      Performance deficits affecting function:  weakness, impaired endurance, impaired self care skills, impaired functional mobilty, gait instability, impaired balance, decreased lower extremity function, impaired cardiopulmonary response to activity, edema .    Rehab Potential is good    Activity Tolerance: Fair    Plan:     Patient to be seen 6 x/week to address the above listed problems via gait training, therapeutic activities, therapeutic exercises, neuromuscular re-education, wheelchair management/training    · Plan of Care Expires: 03/21/22  · Plan of Care Reviewed with: patient    Subjective     "OK". "tired, didn't sleep good" agreeable to therapy "need to use the bathroom first"    Pain/Comfort:  · Pain Rating 1: 0/10  · Pain Rating Post-Intervention 1: 0/10    Patient's cultural, spiritual, Uatsdin conflicts given the current situation:  · no    Objective:      Patient found  with oxygen upon PT entry to room.     Therapeutic Activities and Exercises:     Functional Mobility:  · Transfers:     · Sit to Stand:  supervision and stand by assistance with rolling walker  · Back to BSChair  supervision and stand by assistance with  rolling " walker  using  Stand Pivot  · Toilet Transfer: contact guard assistance (low surface) with  rolling walker and grab bars  using  Stand Pivot  · A with clothing mgmt, set up for pericare and hand hygiene in sitting  · Gait: amb with RW close SBA vcs for staying closer to RW erect posture ~ 15 x 2 to/from bathroom and 80 ft in room seated rest break 02 in tow FFP    AM-PAC 6 CLICK MOBILITY  18    Patient left up in chair with all lines intact, call button in reach, friend present and belongings in reach, friend came during session to visit.    GOALS:   Multidisciplinary Problems     Physical Therapy Goals        Problem: Physical Therapy Goal    Goal Priority Disciplines Outcome Goal Variances Interventions   Physical Therapy Goal     PT, PT/OT Ongoing, Progressing     Description: Goals to be met by: 3/14/22    Patient will increase functional independence with mobility by performing:    . Supine to sit with [place]  . Sit to supine with [place]  . Rolling to Left and Right with [place].  . Sit to stand transfer with Supervision-met 3/3/22  . Bed to chair transfer with Supervision using Rolling Walker  . Gait  x 100 feet with Supervision using Rolling Walker.   . Wheelchair propulsion x50 feet with Supervision using bilateral uppper extremities  . Ascend/Descend 4 inch curb step with Contact Guard Assistance using Rolling Walker.-met 3/3/22  . Retrieve object off floor with RW and reacher and SBA.                     Time Tracking:     PT Received On: 03/12/22  PT Total Time (min):       Billable Minutes: Gait Training 15 and Therapeutic Activity 11    Treatment Type: Treatment  PT/PTA: PTA     PTA Visit Number: 1     03/12/2022

## 2022-03-13 LAB — POCT GLUCOSE: 186 MG/DL (ref 70–110)

## 2022-03-13 PROCEDURE — 99900035 HC TECH TIME PER 15 MIN (STAT)

## 2022-03-13 PROCEDURE — 11000004 HC SNF PRIVATE

## 2022-03-13 PROCEDURE — 25000003 PHARM REV CODE 250: Performed by: HOSPITALIST

## 2022-03-13 PROCEDURE — 25000003 PHARM REV CODE 250: Performed by: NURSE PRACTITIONER

## 2022-03-13 PROCEDURE — 94761 N-INVAS EAR/PLS OXIMETRY MLT: CPT

## 2022-03-13 PROCEDURE — 27000221 HC OXYGEN, UP TO 24 HOURS

## 2022-03-13 PROCEDURE — 25000242 PHARM REV CODE 250 ALT 637 W/ HCPCS: Performed by: NURSE PRACTITIONER

## 2022-03-13 RX ADMIN — FUROSEMIDE 40 MG: 40 TABLET ORAL at 08:03

## 2022-03-13 RX ADMIN — TICAGRELOR 90 MG: 90 TABLET ORAL at 09:03

## 2022-03-13 RX ADMIN — ATORVASTATIN CALCIUM 80 MG: 20 TABLET, FILM COATED ORAL at 08:03

## 2022-03-13 RX ADMIN — ASPIRIN 81 MG CHEWABLE TABLET 81 MG: 81 TABLET CHEWABLE at 08:03

## 2022-03-13 RX ADMIN — GABAPENTIN 100 MG: 100 CAPSULE ORAL at 08:03

## 2022-03-13 RX ADMIN — HYDROCODONE BITARTRATE AND ACETAMINOPHEN 1 TABLET: 5; 325 TABLET ORAL at 09:03

## 2022-03-13 RX ADMIN — TRIAMCINOLONE ACETONIDE: 1 CREAM TOPICAL at 08:03

## 2022-03-13 RX ADMIN — CHOLESTYRAMINE 4 G: 4 POWDER, FOR SUSPENSION ORAL at 08:03

## 2022-03-13 RX ADMIN — METOPROLOL SUCCINATE 50 MG: 50 TABLET, EXTENDED RELEASE ORAL at 08:03

## 2022-03-13 RX ADMIN — PANTOPRAZOLE SODIUM 40 MG: 40 TABLET, DELAYED RELEASE ORAL at 08:03

## 2022-03-13 RX ADMIN — SIMETHICONE 80 MG: 80 TABLET, CHEWABLE ORAL at 09:03

## 2022-03-13 RX ADMIN — AMLODIPINE BESYLATE 10 MG: 10 TABLET ORAL at 08:03

## 2022-03-13 RX ADMIN — FLUTICASONE FUROATE AND VILANTEROL TRIFENATATE 1 PUFF: 100; 25 POWDER RESPIRATORY (INHALATION) at 08:03

## 2022-03-13 RX ADMIN — TICAGRELOR 90 MG: 90 TABLET ORAL at 08:03

## 2022-03-13 RX ADMIN — LISINOPRIL 10 MG: 10 TABLET ORAL at 08:03

## 2022-03-13 RX ADMIN — HYDROCODONE BITARTRATE AND ACETAMINOPHEN 1 TABLET: 5; 325 TABLET ORAL at 08:03

## 2022-03-13 RX ADMIN — TRIAMCINOLONE ACETONIDE: 1 CREAM TOPICAL at 09:03

## 2022-03-13 RX ADMIN — Medication 100 MG: at 08:03

## 2022-03-13 RX ADMIN — Medication 1 CAPSULE: at 08:03

## 2022-03-13 RX ADMIN — Medication 6 MG: at 08:03

## 2022-03-13 RX ADMIN — PSYLLIUM HUSK 1 PACKET: 3.4 POWDER ORAL at 08:03

## 2022-03-13 RX ADMIN — TIOTROPIUM BROMIDE INHALATION SPRAY 2 PUFF: 3.12 SPRAY, METERED RESPIRATORY (INHALATION) at 08:03

## 2022-03-14 ENCOUNTER — LAB VISIT (OUTPATIENT)
Dept: LAB | Facility: OTHER | Age: 77
End: 2022-03-14
Payer: MEDICARE

## 2022-03-14 DIAGNOSIS — Z20.822 ENCOUNTER FOR LABORATORY TESTING FOR COVID-19 VIRUS: ICD-10-CM

## 2022-03-14 PROCEDURE — 99900035 HC TECH TIME PER 15 MIN (STAT)

## 2022-03-14 PROCEDURE — 97535 SELF CARE MNGMENT TRAINING: CPT

## 2022-03-14 PROCEDURE — 25000003 PHARM REV CODE 250: Performed by: HOSPITALIST

## 2022-03-14 PROCEDURE — 94761 N-INVAS EAR/PLS OXIMETRY MLT: CPT

## 2022-03-14 PROCEDURE — 25000003 PHARM REV CODE 250: Performed by: NURSE PRACTITIONER

## 2022-03-14 PROCEDURE — 11000004 HC SNF PRIVATE

## 2022-03-14 PROCEDURE — U0003 INFECTIOUS AGENT DETECTION BY NUCLEIC ACID (DNA OR RNA); SEVERE ACUTE RESPIRATORY SYNDROME CORONAVIRUS 2 (SARS-COV-2) (CORONAVIRUS DISEASE [COVID-19]), AMPLIFIED PROBE TECHNIQUE, MAKING USE OF HIGH THROUGHPUT TECHNOLOGIES AS DESCRIBED BY CMS-2020-01-R: HCPCS | Performed by: EMERGENCY MEDICINE

## 2022-03-14 PROCEDURE — 97110 THERAPEUTIC EXERCISES: CPT | Mod: CQ

## 2022-03-14 PROCEDURE — 27000221 HC OXYGEN, UP TO 24 HOURS

## 2022-03-14 PROCEDURE — 97530 THERAPEUTIC ACTIVITIES: CPT | Mod: CQ

## 2022-03-14 RX ORDER — DICLOFENAC SODIUM 10 MG/G
4 GEL TOPICAL 2 TIMES DAILY
Status: DISCONTINUED | OUTPATIENT
Start: 2022-03-14 | End: 2022-03-16 | Stop reason: HOSPADM

## 2022-03-14 RX ADMIN — ASPIRIN 81 MG CHEWABLE TABLET 81 MG: 81 TABLET CHEWABLE at 08:03

## 2022-03-14 RX ADMIN — Medication 6 MG: at 08:03

## 2022-03-14 RX ADMIN — HYDROCODONE BITARTRATE AND ACETAMINOPHEN 1 TABLET: 5; 325 TABLET ORAL at 01:03

## 2022-03-14 RX ADMIN — FUROSEMIDE 40 MG: 40 TABLET ORAL at 08:03

## 2022-03-14 RX ADMIN — TIOTROPIUM BROMIDE INHALATION SPRAY 2 PUFF: 3.12 SPRAY, METERED RESPIRATORY (INHALATION) at 09:03

## 2022-03-14 RX ADMIN — Medication 1 CAPSULE: at 08:03

## 2022-03-14 RX ADMIN — ATORVASTATIN CALCIUM 80 MG: 20 TABLET, FILM COATED ORAL at 08:03

## 2022-03-14 RX ADMIN — TRIAMCINOLONE ACETONIDE: 1 CREAM TOPICAL at 08:03

## 2022-03-14 RX ADMIN — TICAGRELOR 90 MG: 90 TABLET ORAL at 08:03

## 2022-03-14 RX ADMIN — HYDROCODONE BITARTRATE AND ACETAMINOPHEN 1 TABLET: 5; 325 TABLET ORAL at 08:03

## 2022-03-14 RX ADMIN — Medication 100 MG: at 08:03

## 2022-03-14 RX ADMIN — CHOLESTYRAMINE 4 G: 4 POWDER, FOR SUSPENSION ORAL at 08:03

## 2022-03-14 RX ADMIN — GABAPENTIN 100 MG: 100 CAPSULE ORAL at 08:03

## 2022-03-14 RX ADMIN — PANTOPRAZOLE SODIUM 40 MG: 40 TABLET, DELAYED RELEASE ORAL at 08:03

## 2022-03-14 RX ADMIN — TRIAMCINOLONE ACETONIDE: 1 CREAM TOPICAL at 09:03

## 2022-03-14 RX ADMIN — FLUTICASONE FUROATE AND VILANTEROL TRIFENATATE 1 PUFF: 100; 25 POWDER RESPIRATORY (INHALATION) at 08:03

## 2022-03-14 RX ADMIN — AMLODIPINE BESYLATE 10 MG: 10 TABLET ORAL at 08:03

## 2022-03-14 RX ADMIN — METOPROLOL SUCCINATE 50 MG: 50 TABLET, EXTENDED RELEASE ORAL at 08:03

## 2022-03-14 RX ADMIN — DICLOFENAC SODIUM 4 G: 10 GEL TOPICAL at 05:03

## 2022-03-14 NOTE — PT/OT/SLP PROGRESS
"Physical Therapy Treatment    Patient Name:  Marisol Kerr   MRN:  [id number]  Admit Date: 2/21/2022  Admitting Diagnosis: NSTEMI (non-ST elevated myocardial infarction)  Recent Surgeries:     General Precautions: Standard, fall   Orthopedic Precautions:N/A   Braces:       Recommendations:     Discharge Recommendations:  home health PT   Level of Assistance Recommended at Discharge: Intermittent assistance   Discharge Equipment Recommendations: none   Barriers to discharge: Decreased caregiver support    Assessment:     Marisol Kerr is a 76 y.o. female admitted with a medical diagnosis of NSTEMI (non-ST elevated myocardial infarction) . Pt with limited session 2* to L hip pain declined/deferred gait today, pt would continue to benefit from skilled PT services to improve overall functional mobility, strength and endurance.  .      Performance deficits affecting function:  weakness, impaired endurance, impaired self care skills, impaired functional mobilty, gait instability, impaired balance, decreased lower extremity function, impaired cardiopulmonary response to activity, edema .    Rehab Potential is good    Activity Tolerance: Fair    Plan:     Patient to be seen 6 x/week to address the above listed problems via gait training, therapeutic activities, therapeutic exercises, neuromuscular re-education, wheelchair management/training    · Plan of Care Expires: 03/21/22  · Plan of Care Reviewed with: patient    Subjective     " this hip is killing me, I slept on that side last night, think that's what it is, didn't start feeling it until I went to the bathroom" agreeable to therapy exercises to work it out.     Pain/Comfort:  · Pain Rating 1: 10/10 off/on if it catches  · Location - Side 1: Left  · Location - Orientation 1: lateral  · Location 1: hip  · Pain Addressed 1: Reposition, Distraction, Cessation of Activity, Nurse notified (requested tylenol and post session asked if she could get xray nsg Shakila notified, pt " "denies injury "I slept on that side")    Patient's cultural, spiritual, Holiness conflicts given the current situation:  · no    Objective:     Communicated with nsg Shakila post  Session. Re pts wanting to get xray, denied injury, per nsg she will send message to NP Natalie  Patient found  with oxygen (in BSC) upon PT entry to room.     Therapeutic Activities and Exercises: 2x10 reps AP,GS,LAQ,hip flex,abd/add mini elliptical x 15 minutes mostly bwds tolerated better and rest breaks as need , ed all within tolerable ROM    Functional Mobility:  · Transfers: pt appeared to be sitting more on R hip to off load L hip    · Sit to Stand:  Attempted repositioning contact guard assistance with rolling walker still hurt  · Gait: deferred 2* to hip pain    AM-PAC 6 CLICK MOBILITY  18    Patient left up in chair with call button in reach and belonging sin reach.    GOALS:   Multidisciplinary Problems     Physical Therapy Goals        Problem: Physical Therapy Goal    Goal Priority Disciplines Outcome Goal Variances Interventions   Physical Therapy Goal     PT, PT/OT Ongoing, Progressing     Description: Goals to be met by: 3/14/22    Patient will increase functional independence with mobility by performing:    . Supine to sit with McRae  . Sit to supine with McRae  . Rolling to Left and Right with McRae.  . Sit to stand transfer with Supervision-met 3/3/22  . Bed to chair transfer with Supervision using Rolling Walker  . Gait  x 100 feet with Supervision using Rolling Walker.   . Wheelchair propulsion x50 feet with Supervision using bilateral uppper extremities  . Ascend/Descend 4 inch curb step with Contact Guard Assistance using Rolling Walker.-met 3/3/22  . Retrieve object off floor with RW and reacher and SBA.                     Time Tracking:     PT Received On: 03/14/22  PT Total Time (min):       Billable Minutes: Therapeutic Activity 10 and Therapeutic Exercise 16    Treatment Type: " Treatment  PT/PTA: PTA     PTA Visit Number: 2     03/14/2022

## 2022-03-14 NOTE — PT/OT/SLP PROGRESS
Occupational Therapy   Treatment    Name: Marisol Kerr  MRN: [id number]  Admit Date: 2/21/2022  Admitting Diagnosis:  NSTEMI (non-ST elevated myocardial infarction)    General Precautions: Standard, fall   Orthopedic Precautions:N/A   Braces: N/A     Recommendations:     Discharge Recommendations: home health OT  Level of Assistance Recommended at Discharge: Intermittent assistance for ADL's and homemaking tasks  Discharge Equipment Recommendations:  none  Barriers to discharge:  Decreased caregiver support    Assessment:     Marisol Kerr is a 76 y.o. female with a medical diagnosis of NSTEMI (non-ST elevated myocardial infarction) .  She presents with good motivation for therapy. Performance deficits affecting function are weakness, impaired endurance, impaired self care skills, impaired functional mobilty, gait instability, impaired balance, decreased lower extremity function, impaired cardiopulmonary response to activity, decreased upper extremity function, decreased coordination.     Rehab Potential is good    Activity tolerance:  Good    Plan:     Patient to be seen 6 x/week to address the above listed problems via self-care/home management, therapeutic activities, therapeutic exercises    · Plan of Care Expires: 03/22/22  · Plan of Care Reviewed with: patient    Subjective     Communicated with: nurse prior to session.     Pain/Comfort:  · Pain Rating 1: 0/10    Patient's cultural, spiritual, Sikhism conflicts given the current situation:  · no (Cheondoism)    Objective:     Patient found HOB elevated with oxygen upon OT entry to room.    Bed Mobility:    · Patient completed Rolling/Turning to Right with supervision  · Patient completed Supine to Sit with stand by assistance     Functional Mobility/Transfers:  · Patient completed Sit <> Stand Transfer with stand by assistance  with  rolling walker   · Functional Mobility: Pt able to functionally ambulate from W/C to bedside chair SBA with RW.    Activities of  Daily Living:  · Feeding:  modified independence with tray table at bedside chair.  · Grooming: supervision for brushing dentures, washing face and body seated in w/c  · Bathing: minimum assistance to wash unreachable part of back while seated.  · Lower Body Dressing: moderate assistance to change adult brief while standing at W/C.    [facility] 6 Click ADL: 21    Treatment & Education:  Role of OT and POC  Safety  ADL retraining  Functional mobility training  Discharge planning  Importance of EOB/OOB activity  Pt challenged static stand (at sink for >5 minutes) without rest break.  Re-educated on RW management while ambulating.  Reviewed energy conservation and safety during ADL's.    Patient left up in chair with all lines intact, call button in reach and nurse notifiedEducation:      GOALS:   Multidisciplinary Problems     Occupational Therapy Goals        Problem: Occupational Therapy Goal    Goal Priority Disciplines Outcome Interventions   Occupational Therapy Goal     OT, PT/OT Ongoing, Progressing    Description: Goals to be met by: 3/8/22     Patient will increase functional independence with ADLs by performing:    UE Dressing with Modified Holt. MET  LE Dressing with Supervision. Using hip kit to perform LBD. =MET  Grooming while seated at sink with Modified Holt. MET  UPDATED: Grooming while standing at the sink with Supervision.   Toileting from toilet or BSC with Supervision for hygiene and clothing management.   Bathing from  sitting at sink with Minimal Assistance with (A) to wash distal LEs  Supine to sit with Modified Holt.  Stand pivot transfers with Supervision with RW to steady.   Upper extremity exercise with UBE  for 10 minutes to increase functional endurance with supervision. MET  Caregiver will be educated on level of assist required to safely perform self care tasks and functional transfers..                      Time Tracking:     OT Date of Treatment: OT Date of  Treatment: 03/14/22  OT Total Time (min): Total Time (min): 60 min    Billable Minutes:Self Care/Home Management 60    3/14/2022

## 2022-03-14 NOTE — PLAN OF CARE
Problem: Occupational Therapy Goal  Goal: Occupational Therapy Goal  Description: Goals to be met by: 3/8/22     Patient will increase functional independence with ADLs by performing:    UE Dressing with Modified Douglas. MET  LE Dressing with Supervision. Using hip kit to perform LBD. =MET  Grooming while seated at sink with Modified [place]. MET  UPDATED: Grooming while standing at the sink with Supervision.   Toileting from toilet or BSC with Supervision for hygiene and clothing management.   Bathing from  sitting at sink with Minimal Assistance with (A) to wash distal LE's MET  Supine to sit with Modified Douglas.  Stand pivot transfers with Supervision with RW to steady.   Upper extremity exercise with UBE  for 10 minutes to increase functional endurance with supervision. MET  Caregiver will be educated on level of assist required to safely perform self care tasks and functional transfers..     Outcome: Ongoing, Progressing   Pts goals remain appropriate.

## 2022-03-15 ENCOUNTER — HOSPITAL ENCOUNTER (OUTPATIENT)
Facility: HOSPITAL | Age: 77
Discharge: HOME | End: 2022-03-18
Attending: EMERGENCY MEDICINE | Admitting: INTERNAL MEDICINE
Payer: MEDICARE

## 2022-03-15 DIAGNOSIS — R07.9 CHEST PAIN: ICD-10-CM

## 2022-03-15 DIAGNOSIS — N17.9 AKI (ACUTE KIDNEY INJURY): Primary | ICD-10-CM

## 2022-03-15 DIAGNOSIS — R07.9 CHEST PAIN, UNSPECIFIED TYPE: ICD-10-CM

## 2022-03-15 LAB
ANION GAP SERPL CALC-SCNC: 8 MMOL/L (ref 8–16)
BASOPHILS # BLD AUTO: 0.04 K/UL (ref 0–0.2)
BASOPHILS NFR BLD: 0.7 % (ref 0–1.9)
BUN SERPL-MCNC: 72 MG/DL (ref 8–23)
CALCIUM SERPL-MCNC: 9.1 MG/DL (ref 8.7–10.5)
CHLORIDE SERPL-SCNC: 110 MMOL/L (ref 95–110)
CO2 SERPL-SCNC: 21 MMOL/L (ref 23–29)
CREAT SERPL-MCNC: 1.7 MG/DL (ref 0.5–1.4)
DIFFERENTIAL METHOD: ABNORMAL
EOSINOPHIL # BLD AUTO: 0.2 K/UL (ref 0–0.5)
EOSINOPHIL NFR BLD: 3.7 % (ref 0–8)
ERYTHROCYTE [DISTWIDTH] IN BLOOD BY AUTOMATED COUNT: 15 % (ref 11.5–14.5)
EST. GFR  (AFRICAN AMERICAN): 33.8 ML/MIN/1.73 M^2
EST. GFR  (NON AFRICAN AMERICAN): 29.3 ML/MIN/1.73 M^2
GLUCOSE SERPL-MCNC: 90 MG/DL (ref 70–110)
HCT VFR BLD AUTO: 28.8 % (ref 37–48.5)
HGB BLD-MCNC: 8.5 G/DL (ref 12–16)
IMM GRANULOCYTES # BLD AUTO: 0.02 K/UL (ref 0–0.04)
IMM GRANULOCYTES NFR BLD AUTO: 0.3 % (ref 0–0.5)
LYMPHOCYTES # BLD AUTO: 1.4 K/UL (ref 1–4.8)
LYMPHOCYTES NFR BLD: 22.5 % (ref 18–48)
MAGNESIUM SERPL-MCNC: 1.8 MG/DL (ref 1.6–2.6)
MCH RBC QN AUTO: 28.7 PG (ref 27–31)
MCHC RBC AUTO-ENTMCNC: 29.5 G/DL (ref 32–36)
MCV RBC AUTO: 97 FL (ref 82–98)
MONOCYTES # BLD AUTO: 0.5 K/UL (ref 0.3–1)
MONOCYTES NFR BLD: 8.7 % (ref 4–15)
NEUTROPHILS # BLD AUTO: 3.8 K/UL (ref 1.8–7.7)
NEUTROPHILS NFR BLD: 64.1 % (ref 38–73)
NRBC BLD-RTO: 0 /100 WBC
PHOSPHATE SERPL-MCNC: 4.2 MG/DL (ref 2.7–4.5)
PLATELET # BLD AUTO: 135 K/UL (ref 150–450)
PMV BLD AUTO: 12.4 FL (ref 9.2–12.9)
POTASSIUM SERPL-SCNC: 5.4 MMOL/L (ref 3.5–5.1)
RBC # BLD AUTO: 2.96 M/UL (ref 4–5.4)
SODIUM SERPL-SCNC: 139 MMOL/L (ref 136–145)
WBC # BLD AUTO: 5.99 K/UL (ref 3.9–12.7)

## 2022-03-15 PROCEDURE — 99285 PR EMERGENCY DEPT VISIT,LEVEL V: ICD-10-PCS | Mod: CS,,, | Performed by: EMERGENCY MEDICINE

## 2022-03-15 PROCEDURE — 36415 COLL VENOUS BLD VENIPUNCTURE: CPT | Performed by: NURSE PRACTITIONER

## 2022-03-15 PROCEDURE — 97110 THERAPEUTIC EXERCISES: CPT | Mod: CQ

## 2022-03-15 PROCEDURE — 99900035 HC TECH TIME PER 15 MIN (STAT)

## 2022-03-15 PROCEDURE — 25000003 PHARM REV CODE 250: Performed by: HOSPITALIST

## 2022-03-15 PROCEDURE — 97530 THERAPEUTIC ACTIVITIES: CPT | Mod: CQ

## 2022-03-15 PROCEDURE — 84100 ASSAY OF PHOSPHORUS: CPT | Performed by: NURSE PRACTITIONER

## 2022-03-15 PROCEDURE — 93010 ELECTROCARDIOGRAM REPORT: CPT | Mod: ,,, | Performed by: INTERNAL MEDICINE

## 2022-03-15 PROCEDURE — 93005 ELECTROCARDIOGRAM TRACING: CPT

## 2022-03-15 PROCEDURE — 80048 BASIC METABOLIC PNL TOTAL CA: CPT

## 2022-03-15 PROCEDURE — 25000242 PHARM REV CODE 250 ALT 637 W/ HCPCS: Performed by: NURSE PRACTITIONER

## 2022-03-15 PROCEDURE — 97530 THERAPEUTIC ACTIVITIES: CPT | Mod: CO

## 2022-03-15 PROCEDURE — 94761 N-INVAS EAR/PLS OXIMETRY MLT: CPT

## 2022-03-15 PROCEDURE — 25000003 PHARM REV CODE 250: Performed by: NURSE PRACTITIONER

## 2022-03-15 PROCEDURE — 18500000 HC LEAVE OF ABSENCE HOSPITAL SERVICES

## 2022-03-15 PROCEDURE — 93010 EKG 12-LEAD: ICD-10-PCS | Mod: ,,, | Performed by: INTERNAL MEDICINE

## 2022-03-15 PROCEDURE — 99285 EMERGENCY DEPT VISIT HI MDM: CPT | Mod: CS,,, | Performed by: EMERGENCY MEDICINE

## 2022-03-15 PROCEDURE — 27000221 HC OXYGEN, UP TO 24 HOURS

## 2022-03-15 PROCEDURE — 99285 EMERGENCY DEPT VISIT HI MDM: CPT | Mod: 25

## 2022-03-15 PROCEDURE — 83735 ASSAY OF MAGNESIUM: CPT | Performed by: NURSE PRACTITIONER

## 2022-03-15 PROCEDURE — 80048 BASIC METABOLIC PNL TOTAL CA: CPT | Performed by: NURSE PRACTITIONER

## 2022-03-15 PROCEDURE — 85025 COMPLETE CBC W/AUTO DIFF WBC: CPT | Performed by: NURSE PRACTITIONER

## 2022-03-15 RX ORDER — ASPIRIN 325 MG
325 TABLET ORAL
Status: DISPENSED | OUTPATIENT
Start: 2022-03-15 | End: 2022-03-16

## 2022-03-15 RX ORDER — SODIUM BICARBONATE 650 MG/1
650 TABLET ORAL ONCE
Status: COMPLETED | OUTPATIENT
Start: 2022-03-15 | End: 2022-03-15

## 2022-03-15 RX ORDER — MICONAZOLE NITRATE 2 %
POWDER (GRAM) TOPICAL 2 TIMES DAILY
Status: DISCONTINUED | OUTPATIENT
Start: 2022-03-15 | End: 2022-03-16 | Stop reason: HOSPADM

## 2022-03-15 RX ADMIN — FLUTICASONE FUROATE AND VILANTEROL TRIFENATATE 1 PUFF: 100; 25 POWDER RESPIRATORY (INHALATION) at 09:03

## 2022-03-15 RX ADMIN — FUROSEMIDE 40 MG: 40 TABLET ORAL at 08:03

## 2022-03-15 RX ADMIN — ONDANSETRON 8 MG: 8 TABLET, ORALLY DISINTEGRATING ORAL at 02:03

## 2022-03-15 RX ADMIN — ERGOCALCIFEROL 50000 UNITS: 1.25 CAPSULE ORAL at 08:03

## 2022-03-15 RX ADMIN — LISINOPRIL 10 MG: 10 TABLET ORAL at 08:03

## 2022-03-15 RX ADMIN — AMLODIPINE BESYLATE 10 MG: 10 TABLET ORAL at 08:03

## 2022-03-15 RX ADMIN — MICONAZOLE NITRATE 2 % TOPICAL POWDER: at 12:03

## 2022-03-15 RX ADMIN — PSYLLIUM HUSK 1 PACKET: 3.4 POWDER ORAL at 08:03

## 2022-03-15 RX ADMIN — TICAGRELOR 90 MG: 90 TABLET ORAL at 08:03

## 2022-03-15 RX ADMIN — Medication 1 CAPSULE: at 08:03

## 2022-03-15 RX ADMIN — METOPROLOL SUCCINATE 50 MG: 50 TABLET, EXTENDED RELEASE ORAL at 08:03

## 2022-03-15 RX ADMIN — SODIUM BICARBONATE 650 MG TABLET 650 MG: at 05:03

## 2022-03-15 RX ADMIN — SODIUM POLYSTYRENE SULFONATE 30 G: 15 SUSPENSION ORAL; RECTAL at 05:03

## 2022-03-15 RX ADMIN — Medication 100 MG: at 08:03

## 2022-03-15 RX ADMIN — PANTOPRAZOLE SODIUM 40 MG: 40 TABLET, DELAYED RELEASE ORAL at 08:03

## 2022-03-15 RX ADMIN — CHOLESTYRAMINE 4 G: 4 POWDER, FOR SUSPENSION ORAL at 08:03

## 2022-03-15 RX ADMIN — HYDROCODONE BITARTRATE AND ACETAMINOPHEN 1 TABLET: 5; 325 TABLET ORAL at 08:03

## 2022-03-15 RX ADMIN — ASPIRIN 81 MG CHEWABLE TABLET 81 MG: 81 TABLET CHEWABLE at 08:03

## 2022-03-15 RX ADMIN — TIOTROPIUM BROMIDE INHALATION SPRAY 2 PUFF: 3.12 SPRAY, METERED RESPIRATORY (INHALATION) at 09:03

## 2022-03-15 RX ADMIN — DICLOFENAC SODIUM 4 G: 10 GEL TOPICAL at 09:03

## 2022-03-15 RX ADMIN — TRIAMCINOLONE ACETONIDE: 1 CREAM TOPICAL at 09:03

## 2022-03-15 NOTE — PLAN OF CARE
Problem: Adult Inpatient Plan of Care  Goal: Absence of Hospital-Acquired Illness or Injury  Outcome: Ongoing, Progressing     Problem: Adult Inpatient Plan of Care  Goal: Optimal Comfort and Wellbeing  Outcome: Ongoing, Progressing     Problem: Fall Injury Risk  Goal: Absence of Fall and Fall-Related Injury  Outcome: Ongoing, Progressing     Problem: Skin Injury Risk Increased  Goal: Skin Health and Integrity  Outcome: Ongoing, Progressing

## 2022-03-15 NOTE — PROGRESS NOTES
[facility]                                  Skilled Nursing Facility                   Progress Note     Admit Date: 2/21/2022  LUZ  TBD  Principal Problem:  NSTEMI (non-ST elevated myocardial infarction)   HPI obtained from patient interview and chart review     Chief Complaint: Re-evaluation of medical treatment and therapy status: Lab review, evaluation of left hip x-ray, hyperkalemia    HPI:   Ms Kerr is a 76 year old female with PMHx of DM2, HTN, HLD, CKD, COPD on home O2 who presents SNF following hospitalization for NSTEMI.  Admission to SNF for secondary to weakness and debility.    Interval history: All of today's labs reviewed and are listed below.  K noted to be 5.4, bicarb 21, BUN/creatinine increased to 72/1.7.  24 hr vital sign ranges listed below.  Yesterday, received message from therapy that patient had acute left hip pain.  Ordered for x-ray to evaluate, negative for fracture, noted to have severe osteoarthritis.  Initiated Voltaren gel.  Patient noted to have minor increase in bilateral lower extremity edema.  Patient denies abdominal discomfort, nausea, or vomiting.  Patient reports an adequate appetite.  Patient denies dysuria.  Patient reports having regular bowel movements.  Patient progessing with PT/OT- refused ambulation for last 2 days due to pain. Continuing to follow and treat all acute and chronic conditions.    Past Medical History: Patient has a past medical history of CAD (coronary artery disease), Cancer, CHF (congestive heart failure), Colitis, Colon cancer, COPD (chronic obstructive pulmonary disease), Decreased hearing, Diabetes mellitus, Diabetes mellitus, type 2, Hypercholesterolemia, Hypertension, Hypoxemia, Insomnia, Obesity, and Osteoarthritis.    Past Surgical History: Patient has a past surgical history that includes External ear surgery; Colectomy; Cholecystectomy; Flexible sigmoidoscopy (N/A,  9/19/2019); Eye surgery; Hysterectomy; Coronary stent placement; Cardiac catheterization; Coronary angioplasty; and ANGIOGRAM, CORONARY, WITH LEFT HEART CATHETERIZATION (N/A, 2/10/2022).    Social History: Patient reports that she quit smoking about 16 years ago. Her smoking use included cigarettes. She started smoking about 57 years ago. She has a 150.00 pack-year smoking history. She has never used smokeless tobacco. She reports current alcohol use. She reports that she does not use drugs.    Family History: family history includes Febrile seizures in her mother; Heart failure in her father.    Allergies: Patient is allergic to iodinated contrast media and strawberries [strawberry].    ROS  Constitutional: Negative for fever   Eyes: Negative for blurred vision, double vision   Respiratory: Negative for cough, shortness of breath   Cardiovascular: Negative for chest pain, palpitations, and leg swelling.   Gastrointestinal: Negative for abdominal pain, constipation, diarrhea, nausea, vomiting.   Genitourinary:  Negative for dysuria, frequency   Musculoskeletal:  + generalized weakness.  +left hip pain, buttocks pain, left shoulder pain  Skin: Negative for itching and rash.   Neurological: Negative for dizziness, headaches.   Psychiatric/Behavioral: Negative for depression. The patient is not nervous/anxious.      24 hour Vital Sign Range   Temp:  [97.8 °F (36.6 °C)-98.2 °F (36.8 °C)]   Pulse:  [60-61]   Resp:  [18-20]   BP: (110-132)/(58-60)   SpO2:  [98 %-100 %]     PEx  Constitutional: Patient appears debilitated.  No distress noted  HENT:   Head: Normocephalic and atraumatic.   Eyes: Pupils are equal, round  Neck: Normal range of motion. Neck supple.   Cardiovascular: Normal rate, regular rhythm and normal heart sounds.    Pulmonary/Chest: Effort normal and breath sounds are clear with diminished bases.  Supplemental oxygen in progress  Abdominal: Soft. Bowel sounds are normal.   Musculoskeletal: Normal range of  motion.   Neurological: Alert and oriented to person, place, and time.   Psychiatric: Normal mood and affect. Behavior is normal.   Skin: Skin is warm and dry.  PVD changes to bilateral lower extremities.  Skin breakdown to buttocks    Recent Labs   Lab 03/15/22  0415      K 5.4*      CO2 21*   BUN 72*   CREATININE 1.7*   MG 1.8       Recent Labs   Lab 03/15/22  0415   WBC 5.99   RBC 2.96*   HGB 8.5*   HCT 28.8*   *   MCV 97   MCH 28.7   MCHC 29.5*         Assessment and Plan:    Hyperkalemia  - initiated Kayexalate q.4 hours x2 doses    CKD (chronic kidney disease), stage III  - Baseline sCr 1.5- 1.8   - creatinine increased but within baseline, discontinuing lisinopril as BP's are low to normal and patient with hyperkalemia, continue to monitor twice weekly BMPs, avoid nephrotoxic agents, renally dose medications when appropriate    Left hip pain  - x-ray completed, showing severe osteoarthritis  - will likely need orthopedic evaluation after discharge from SNF  - initiated Voltaren gel 2 g BID  - also check uric acid level to ensure not gout    Acute on chronic diastolic congestive heart failure  - TTE notable for Grade II left ventricular diastolic dysfunction, EF 65%  - admitted with Lasix 40 mg BID, lisinopril 10 mg daily  - Cardiac diet with Fluid restriction at 1.5L with strict I/Os and daily STANDING weights  - continue Lasix 40 mg daily  - checking BMP tomorrow    Partial thickness tissue loss  - triad to gluteal cleft, ordered for wound care consult    NSTEMI (non-ST elevated myocardial infarction)  Coronary artery disease,  multivessel with history of previous PCI  - Recent hx of increasing episodes of angina requiring more frequent Nitroglycerin use and underwent LHC at OSH.   -Recent Left heart cath[02/11/22] showed:  · The RPAV lesion was 100% stenosed.  · The RPDA lesion was 100% stenosed.  · The Prox Cx to Mid Cx lesion was 80% stenosed.  · The 1st LPL lesion was 90%  stenosed.  · The Prox RCA-2 lesion was 70% stenosed.  · The Prox RCA-1 lesion was 90% stenosed.  · The Mid LAD-2 lesion was 60% stenosed.  - continue 81 mg ASA qd, Atorvastatin 80mg qhs, Metoprolol 50 mg XL, Ticagrelor 90mg bid  - LHC deferred given rectus sheath hematoma. Plan for IC intervention 1-2 weeks after admission for outpatient.  Coronary atherosclerosis  - 3/8 required dose of nitroglycerin today, symptom resolve min after 1 administration    Essential hypertension, benign  - home regimen with amlodipine 5 mg daily, benazepril 40 mg daily  - continue amlodipine 10 mg daily, lisinopril 10 mg daily, metoprolol XL 50 mg daily  - 3/15 discontinuing lisinopril for low to normal BP's, hyperkalemia    COPD/emphysema  - Known history of severe COPD (GOLD IV D PFT 2020). On home oxygen (2-3L)  - Home medications: albuterol (VENTOLIN HFA) 90 mcg/actuation inhaler, ipratropium-albuteroL (COMBIVENT RESPIMAT)  mcg/actuation inhaler,  umeclidinium (INCRUSE ELLIPTA) 62.5 mcg/actuation inhalation capsule, fluticasone-salmeterol diskus inhaler 250-50 mcg  - continue Breo and Spiriva inhaler   - DuoNebs PRN    DM type 2, controlled, with complication  - last A1c 5.2 on 01/27/2022  - diabetic diet, Accu-Cheks AC/HS  - continue LDSSI PRN     Gastroesophageal reflux disease without esophagitis  - continue Protonix 40 mg daily    Hypercholesterolemia  - continue Atorvastatin 40mg    Obesity  - BMI currently 33.94  - RD following, weight loss encouraged    Rectus sheath hematoma  - Patient had severe abdominal pain the day before with a mass on the abdomen. Patient was unable to have a BM yesterday. KUB was drawn and showed a large stool burden. Continued bowel regimen but pain was persistent. CT abdomen without contrast and lactic drawn. CT abdomen showed rectal sheath hematoma extending to the pelvis. IR consulted and recommended CTA of the abdomen to identify bleeding vessel. Due to kidney function, held off on CTA for  concerns of contrast induced nephropathy as patient had a Alfredo score of 16 with 100cc of contrast, 15 with 75 cc contrast. IR consulted and following. Hgb went from 13 on admission --> 10.6 --> 9.9 --> 8.9. hematoma was expanding. IR performed embolization of inferior epigastric and collateral vessels.   - continue to monitor abdominal mass     Venous stasis  - Chronic history of venous stasis of bilateral lower extremities limited to breakdown of skin without varicose veins  - Patient denies increase in leg swelling over her baseline  - wound care RN following    Vitamin-D deficiency  - continue ergocalciferol 51735 units weekly    Debility   - Continue with PT/OT for gait training and strengthening and restoration of ADL's   - Encourage mobility, OOB in chair, and early ambulation as appropriate  - Fall precautions   - Monitor for bowel and bladder dysfunction  - Monitor for and prevent skin breakdown and pressure ulcers  - Continue DVT prophylaxis with ASA/ticagrelor, frequent ambulation         Anticipate disposition:  Home with home health      Follow-up needed during SNF stay- interventional cardiology (3/17)    Follow-up needed after discharge from SNF: PCP,     Future Appointments   Date Time Provider Department Center   3/17/2022 10:40 AM Scott Chappell MD [facility]   4/27/2022 11:20 AM Antonieta Marquez NP [facility]       Natalie Clemons NP  Department of Hospital Medicine   [facility]     DOS: 3/15/2022       Patient note was created using MModal Dictation.  Any errors in syntax or even information may not have been identified and edited on initial review prior to signing this note.

## 2022-03-15 NOTE — PT/OT/SLP PROGRESS
"Physical Therapy Treatment    Patient Name:  Marisol Kerr   MRN:  [id number]  Admit Date: 2/21/2022  Admitting Diagnosis: NSTEMI (non-ST elevated myocardial infarction)    General Precautions: Standard, fall   Orthopedic Precautions:N/A   Braces: N/A     Recommendations:     Discharge Recommendations:  home health PT   Level of Assistance Recommended at Discharge: Intermittent assistance   Discharge Equipment Recommendations: none   Barriers to discharge: Decreased caregiver support    Assessment:     Marisol Kerr is a 76 y.o. female admitted with a medical diagnosis of NSTEMI (non-ST elevated myocardial infarction) .     Treatment session limited secondary to high pain especially with weightbearing. Pt agreeable to therex. Pt complete toilet transfer with CGA, but additional assistance with donning and doffing pants. Pt continues to benefit from therapy to improve functional mobility, endurance, and strength.     Performance deficits affecting function:  weakness, impaired endurance, impaired self care skills, impaired functional mobilty, gait instability, impaired balance, decreased lower extremity function, impaired cardiopulmonary response to activity, edema .    Rehab Potential is good    Activity Tolerance: Fair    Plan:     Patient to be seen 6 x/week to address the above listed problems via gait training, therapeutic activities, therapeutic exercises, neuromuscular re-education, wheelchair management/training    · Plan of Care Expires: 03/21/22  · Plan of Care Reviewed with: patient    Subjective     "I can't stand today, my hip hurts too much" and "feel a bit nauseated" RN notified.    Pain/Comfort:  · Pain Rating 1: 10/10  · Location - Side 1: Left  · Location - Orientation 1: generalized  · Location 1: hip  · Pain Addressed 1: Reposition, Distraction, Pre-medicate for activity  · Pain Rating Post-Intervention 1: 10/10    Patient's cultural, spiritual, Oriental orthodox conflicts given the current " situation:  · no    Objective:     Communicated with LONA prior to session.  Patient found up in chair with oxygen upon PT entry to room.      Therapeutic Activities and Exercises:    Seated BLE therex x10 reps: heel/toe raises, LAQ, marching, heel slides  o multiple rest breaks required.   Pt required assistance doffing and donning pants for toileting  Patient educated on role of therapy, goals of session, and benefits of out of bed mobility.   Instructed on use of call button and importance of calling nursing staff for assistance with mobility   Questions/concerns addressed within PTA scope of practice  Pt verbalized understanding    Functional Mobility:  · Transfers:     · Sit to Stand: 2 trials. contact guard assistance with no AD.  · Toilet Transfer: contact guard assistance with  no AD  using  Stand Pivot. Verbal cues for coordination of hand and feet placement  · Gait: pt refused secondary to high pain despite pre-medicated    AM-PAC 6 CLICK MOBILITY  18    Patient left up in chair with call button in reach and nurse notified.    GOALS:   Multidisciplinary Problems     Physical Therapy Goals        Problem: Physical Therapy Goal    Goal Priority Disciplines Outcome Goal Variances Interventions   Physical Therapy Goal     PT, PT/OT Ongoing, Progressing     Description: Goals to be met by: 3/14/22    Patient will increase functional independence with mobility by performing:    . Supine to sit with Ritchie  . Sit to supine with Ritchie  . Rolling to Left and Right with Ritchie.  . Sit to stand transfer with Supervision-met 3/3/22  . Bed to chair transfer with Supervision using Rolling Walker  . Gait  x 100 feet with Supervision using Rolling Walker.   . Wheelchair propulsion x50 feet with Supervision using bilateral uppper extremities  . Ascend/Descend 4 inch curb step with Contact Guard Assistance using Rolling Walker.-met 3/3/22  . Retrieve object off floor with RW and reacher and SBA.                      Time Tracking:     PT Received On: 03/15/22  PT Total Time (min):   29    Billable Minutes: Therapeutic Activity 10 and Therapeutic Exercise 19    Treatment Type: Treatment  PT/PTA: PTA     PTA Visit Number: 3     03/15/2022

## 2022-03-15 NOTE — PLAN OF CARE
Problem: Occupational Therapy Goal  Goal: Occupational Therapy Goal  Description: Goals to be met by: 3/21/22     Patient will increase functional independence with ADLs by performing:    UE Dressing with Modified [place]. MET  LE Dressing with Supervision. Using hip kit to perform LBD. =MET  Grooming while seated at sink with Modified [place]. MET  UPDATED: Grooming while standing at the sink with Supervision.   Toileting from toilet or BSC with Supervision for hygiene and clothing management.   Bathing from  sitting at sink with Minimal Assistance with (A) to wash distal LEs  Supine to sit with Modified [place].  Stand pivot transfers with Supervision with RW to steady.   Upper extremity exercise with UBE  for 10 minutes to increase functional endurance with supervision. MET  Caregiver will be educated on level of assist required to safely perform self care tasks and functional transfers..     Outcome: Ongoing, Progressing

## 2022-03-15 NOTE — PT/OT/SLP PROGRESS
Occupational Therapy   Treatment    Name: Marisol Kerr  MRN: [id number]  Admit Date: 2/21/2022  Admitting Diagnosis:  NSTEMI (non-ST elevated myocardial infarction)    General Precautions: Standard, fall   Orthopedic Precautions:N/A   Braces:       Recommendations:     Discharge Recommendations: home health OT  Level of Assistance Recommended at Discharge: Intermittent assistance for ADL's and homemaking tasks  Discharge Equipment Recommendations:  none  Barriers to discharge:  Decreased caregiver support    Assessment:     Marisol Kerr is a 76 y.o. female with a medical diagnosis of NSTEMI (non-ST elevated myocardial infarction)   She presents with  Performance deficits affecting function are weakness, impaired endurance, impaired self care skills, impaired functional mobility, gait instability, impaired balance, decreased lower extremity function, impaired cardiopulmonary response to activity, decreased upper extremity function, decreased coordination.      Pt. Was cooperative and participated well with session on this day.  Pt with increase time to carry selfcare portion of task on this day. Pt. With redirection back towards task Pt. Is very loquacious at times. Pt  continues to demonstrate levels of physical deficits with  functional indep with daily management activities tasks, selfcare skills with balance,  functional mobility, UB strength and endurance. Pt. Will continue to benefit from continued OT to progress towards goals     Rehab Potential is fair    Activity tolerance:  Fair    Plan:     Patient to be seen 6 x/week to address the above listed problems via self-care/home management, therapeutic activities, therapeutic exercises    · Plan of Care Expires: 03/22/22  · Plan of Care Reviewed with: patient    Subjective     Communicated with: nsg and Pt. prior to session. I was so tired and so sore    Pain/Comfort:       Patient's cultural, spiritual, Confucianist conflicts given the current situation:  no  ([facility])    Objective:     Patient found up in chair with oxygen in place oxygen upon OT entry to room.    Functional Mobility/Transfers:  · Patient completed Sit <> Stand Transfer with contact guard assistance  with  rolling walker     Activities of Daily Living:  · Feeding:  modified independence with lunch management   · Grooming: stand by assistance with facial care  · Upper Body Dressing: stand by assistance to doff gown and to beth pull over shirt  · Lower Body Dressing: contact guard assistance to beth pants seated and to manage over hips instance with RW for bal    [facility] 6 Click ADL:      Treatment & Education:  Pt. With standing act on this day with task. Pt. With CGA/SBA for balance aspects with task with  AD at raised counter Pt with visual perception task with discrimination of various shapes and sizes x 5  min with standing bal and min cues through out with weight shifting and use of BUE's incorporated and crossing mid line and facilitation with posture in prep for home management . P    Pt edu on role of OT, POC, safety when performing self care tasks , benefit of performing OOB activity, and safety when performing functional transfers and mobility management for preparation with goals to progress towards next level of care    Patient left up in chair with all lines intact and call button in reachEducation:      GOALS:   Multidisciplinary Problems     Occupational Therapy Goals        Problem: Occupational Therapy Goal    Goal Priority Disciplines Outcome Interventions   Occupational Therapy Goal     OT, PT/OT Ongoing, Progressing    Description: Goals to be met by: 3/21/22     Patient will increase functional independence with ADLs by performing:    UE Dressing with Modified [place]. MET  LE Dressing with Supervision. Using hip kit to perform LBD. =MET  Grooming while seated at sink with Modified [place]. MET  UPDATED: Grooming while standing at the sink with Supervision.   Toileting from  toilet or BSC with Supervision for hygiene and clothing management.   Bathing from  sitting at sink with Minimal Assistance with (A) to wash distal LEs  Supine to sit with Modified [place].  Stand pivot transfers with Supervision with RW to steady.   Upper extremity exercise with UBE  for 10 minutes to increase functional endurance with supervision. MET  Caregiver will be educated on level of assist required to safely perform self care tasks and functional transfers..                      Time Tracking:     OT Date of Treatment: OT Date of Treatment: 03/02/22  OT Total Time (min): Total Time (min): 54 min    Billable Minutes:Therapeutic Activity 53    3/15/2022  A client care conference was performed between the FREDR and TYLER, prior to treatment to discuss the patient's status, treatment plan and established goals.

## 2022-03-15 NOTE — PROGRESS NOTES
"[facility] - Skilled Nursing  Wound Care    Patient Name:  Marisol Kerr   MRN:  [id number]  Date: 3/15/2022  Diagnosis: NSTEMI (non-ST elevated myocardial infarction)    History:     Past Medical History:   Diagnosis Date    CAD (coronary artery disease)     Cancer     colon    CHF (congestive heart failure)     Colitis     Colon cancer     COPD (chronic obstructive pulmonary disease)     Decreased hearing     Diabetes mellitus     Diabetes mellitus, type 2     Hypercholesterolemia     Hypertension     Hypoxemia     Insomnia     Obesity     Osteoarthritis        Social History     Socioeconomic History    Marital status:    Tobacco Use    Smoking status: Former Smoker     Packs/day: 3.00     Years: 50.00     Pack years: 150.00     Types: Cigarettes     Start date: 3/30/1964     Quit date: 2006     Years since quittin.0    Smokeless tobacco: Never Used    Tobacco comment: ex smoker 15 years   Substance and Sexual Activity    Alcohol use: Yes    Drug use: No    Sexual activity: Never       Precautions:     Allergies as of 2022 - Reviewed 2022   Allergen Reaction Noted    Iodinated contrast media  06/15/2015    Strawberries [strawberry] Hives and Itching 2016       [facility] Assessment Details/Treatment   Wound care consulted for sacral breakdown and heel pain  Ms. Kerr is sitting up at bedside, oxygen cannular in place- short of breath after ambulating from bed to chair.  Not able to fully visualize the coccyx/buttock at this time.  " I feel against a door stop that comes out from the wall a while back and every now and then the wound opens up again and hurts' (on her coccyx)   wound bed is pink/moist partial thickness  The right foot has skin peeling and is red.  The skin is  intact on the heel/ blanches, itching/tender- possible skin fungal infection.   The left foot skin is dry intact, blanchable redness.   The bilateral lower shins have intact pink scar tissue.  " The elastic in the socks was cut due to leaving a ring/tight around the lower shins    Plan :   Buttocks/coccyx-  Triad bid/prn cleansing  Right foot- miconazole powder BID    Nursing to continue care, pressure prevention measures  Wound care will follow up prn  Recommendations made to primary team for above plan per written report . Orders placed.      03/15/22 0810        Altered Skin Integrity 03/12/22 2030 Coccyx #1 Partial thickness tissue loss. Shallow open ulcer with a red or pink wound bed, without slough. Intact or Open/Ruptured Serum-filled blister.   Date First Assessed/Time First Assessed: 03/12/22 2030   Location: Coccyx  Wound Number: #1  Description of Altered Skin Integrity: Partial thickness tissue loss. Shallow open ulcer with a red or pink wound bed, without slough. Intact or Open/Ruptured...   Wound Image    Dressing Appearance Open to air   Drainage Amount None   Drainage Characteristics/Odor No odor   Appearance Pink;Red;Moist   Periwound Area Intact;Dry;Pink   Wound Edges Open   Care Cleansed with:;Sterile normal saline;Applied:;Skin Barrier   Skin Interventions   Device Skin Pressure Protection positioning supports utilized;pressure points protected   Pressure Reduction Devices chair cushion utilized;positioning supports utilized;pressure-redistributing mattress utilized   Pressure Reduction Techniques frequent weight shift encouraged;positioned off wounds     Left heel above    Right heel below           03/15/2022

## 2022-03-16 VITALS
HEIGHT: 63 IN | SYSTOLIC BLOOD PRESSURE: 145 MMHG | WEIGHT: 293 LBS | OXYGEN SATURATION: 100 % | RESPIRATION RATE: 16 BRPM | TEMPERATURE: 98 F | BODY MASS INDEX: 51.91 KG/M2 | HEART RATE: 64 BPM | DIASTOLIC BLOOD PRESSURE: 65 MMHG

## 2022-03-16 PROBLEM — I50.32 CHRONIC DIASTOLIC CONGESTIVE HEART FAILURE: Status: ACTIVE | Noted: 2019-09-17

## 2022-03-16 LAB
ALBUMIN SERPL BCP-MCNC: 2.9 G/DL (ref 3.5–5.2)
ALP SERPL-CCNC: 95 U/L (ref 55–135)
ALT SERPL W/O P-5'-P-CCNC: 13 U/L (ref 10–44)
ANION GAP SERPL CALC-SCNC: 10 MMOL/L (ref 8–16)
ANION GAP SERPL CALC-SCNC: 10 MMOL/L (ref 8–16)
ANION GAP SERPL CALC-SCNC: 9 MMOL/L (ref 8–16)
AST SERPL-CCNC: 14 U/L (ref 10–40)
BASOPHILS # BLD AUTO: 0.02 K/UL (ref 0–0.2)
BASOPHILS # BLD AUTO: 0.05 K/UL (ref 0–0.2)
BASOPHILS NFR BLD: 0.3 % (ref 0–1.9)
BASOPHILS NFR BLD: 0.8 % (ref 0–1.9)
BILIRUB SERPL-MCNC: 0.4 MG/DL (ref 0.1–1)
BNP SERPL-MCNC: 209 PG/ML (ref 0–99)
BUN SERPL-MCNC: 62 MG/DL (ref 8–23)
BUN SERPL-MCNC: 70 MG/DL (ref 8–23)
BUN SERPL-MCNC: 74 MG/DL (ref 8–23)
CALCIUM SERPL-MCNC: 8.8 MG/DL (ref 8.7–10.5)
CALCIUM SERPL-MCNC: 8.8 MG/DL (ref 8.7–10.5)
CALCIUM SERPL-MCNC: 9.1 MG/DL (ref 8.7–10.5)
CHLORIDE SERPL-SCNC: 110 MMOL/L (ref 95–110)
CHLORIDE SERPL-SCNC: 110 MMOL/L (ref 95–110)
CHLORIDE SERPL-SCNC: 112 MMOL/L (ref 95–110)
CO2 SERPL-SCNC: 20 MMOL/L (ref 23–29)
CO2 SERPL-SCNC: 22 MMOL/L (ref 23–29)
CO2 SERPL-SCNC: 24 MMOL/L (ref 23–29)
CREAT SERPL-MCNC: 1.5 MG/DL (ref 0.5–1.4)
CREAT SERPL-MCNC: 1.6 MG/DL (ref 0.5–1.4)
CREAT SERPL-MCNC: 1.8 MG/DL (ref 0.5–1.4)
CTP QC/QA: YES
DIFFERENTIAL METHOD: ABNORMAL
DIFFERENTIAL METHOD: ABNORMAL
EOSINOPHIL # BLD AUTO: 0.1 K/UL (ref 0–0.5)
EOSINOPHIL # BLD AUTO: 0.2 K/UL (ref 0–0.5)
EOSINOPHIL NFR BLD: 2.2 % (ref 0–8)
EOSINOPHIL NFR BLD: 2.7 % (ref 0–8)
ERYTHROCYTE [DISTWIDTH] IN BLOOD BY AUTOMATED COUNT: 14.8 % (ref 11.5–14.5)
ERYTHROCYTE [DISTWIDTH] IN BLOOD BY AUTOMATED COUNT: 14.8 % (ref 11.5–14.5)
EST. GFR  (AFRICAN AMERICAN): 31.5 ML/MIN/1.73 M^2
EST. GFR  (AFRICAN AMERICAN): 36.3 ML/MIN/1.73 M^2
EST. GFR  (AFRICAN AMERICAN): 39.3 ML/MIN/1.73 M^2
EST. GFR  (NON AFRICAN AMERICAN): 27.3 ML/MIN/1.73 M^2
EST. GFR  (NON AFRICAN AMERICAN): 31.5 ML/MIN/1.73 M^2
EST. GFR  (NON AFRICAN AMERICAN): 34.1 ML/MIN/1.73 M^2
GLUCOSE SERPL-MCNC: 106 MG/DL (ref 70–110)
GLUCOSE SERPL-MCNC: 95 MG/DL (ref 70–110)
GLUCOSE SERPL-MCNC: 96 MG/DL (ref 70–110)
HCT VFR BLD AUTO: 27.4 % (ref 37–48.5)
HCT VFR BLD AUTO: 29.4 % (ref 37–48.5)
HGB BLD-MCNC: 8.3 G/DL (ref 12–16)
HGB BLD-MCNC: 8.8 G/DL (ref 12–16)
IMM GRANULOCYTES # BLD AUTO: 0.02 K/UL (ref 0–0.04)
IMM GRANULOCYTES # BLD AUTO: 0.03 K/UL (ref 0–0.04)
IMM GRANULOCYTES NFR BLD AUTO: 0.3 % (ref 0–0.5)
IMM GRANULOCYTES NFR BLD AUTO: 0.5 % (ref 0–0.5)
LYMPHOCYTES # BLD AUTO: 0.9 K/UL (ref 1–4.8)
LYMPHOCYTES # BLD AUTO: 1.1 K/UL (ref 1–4.8)
LYMPHOCYTES NFR BLD: 14.2 % (ref 18–48)
LYMPHOCYTES NFR BLD: 16.4 % (ref 18–48)
MAGNESIUM SERPL-MCNC: 1.8 MG/DL (ref 1.6–2.6)
MCH RBC QN AUTO: 28.9 PG (ref 27–31)
MCH RBC QN AUTO: 29 PG (ref 27–31)
MCHC RBC AUTO-ENTMCNC: 29.9 G/DL (ref 32–36)
MCHC RBC AUTO-ENTMCNC: 30.3 G/DL (ref 32–36)
MCV RBC AUTO: 96 FL (ref 82–98)
MCV RBC AUTO: 96 FL (ref 82–98)
MONOCYTES # BLD AUTO: 0.5 K/UL (ref 0.3–1)
MONOCYTES # BLD AUTO: 0.6 K/UL (ref 0.3–1)
MONOCYTES NFR BLD: 8.3 % (ref 4–15)
MONOCYTES NFR BLD: 8.8 % (ref 4–15)
NEUTROPHILS # BLD AUTO: 4.5 K/UL (ref 1.8–7.7)
NEUTROPHILS # BLD AUTO: 4.8 K/UL (ref 1.8–7.7)
NEUTROPHILS NFR BLD: 71 % (ref 38–73)
NEUTROPHILS NFR BLD: 74.5 % (ref 38–73)
NRBC BLD-RTO: 0 /100 WBC
NRBC BLD-RTO: 0 /100 WBC
PHOSPHATE SERPL-MCNC: 3.8 MG/DL (ref 2.7–4.5)
PLATELET # BLD AUTO: 123 K/UL (ref 150–450)
PLATELET # BLD AUTO: 135 K/UL (ref 150–450)
PMV BLD AUTO: 12.1 FL (ref 9.2–12.9)
PMV BLD AUTO: 12.7 FL (ref 9.2–12.9)
POCT GLUCOSE: 168 MG/DL (ref 70–110)
POCT GLUCOSE: 88 MG/DL (ref 70–110)
POCT GLUCOSE: 92 MG/DL (ref 70–110)
POCT GLUCOSE: 93 MG/DL (ref 70–110)
POTASSIUM SERPL-SCNC: 4.9 MMOL/L (ref 3.5–5.1)
POTASSIUM SERPL-SCNC: 5.2 MMOL/L (ref 3.5–5.1)
POTASSIUM SERPL-SCNC: 5.3 MMOL/L (ref 3.5–5.1)
PROT SERPL-MCNC: 5.6 G/DL (ref 6–8.4)
RBC # BLD AUTO: 2.86 M/UL (ref 4–5.4)
RBC # BLD AUTO: 3.05 M/UL (ref 4–5.4)
SARS-COV-2 RDRP RESP QL NAA+PROBE: NEGATIVE
SARS-COV-2 RNA RESP QL NAA+PROBE: NOT DETECTED
SARS-COV-2- CYCLE NUMBER: NORMAL
SODIUM SERPL-SCNC: 140 MMOL/L (ref 136–145)
SODIUM SERPL-SCNC: 143 MMOL/L (ref 136–145)
SODIUM SERPL-SCNC: 144 MMOL/L (ref 136–145)
TROPONIN I SERPL DL<=0.01 NG/ML-MCNC: 0.01 NG/ML (ref 0–0.03)
TROPONIN I SERPL DL<=0.01 NG/ML-MCNC: 0.01 NG/ML (ref 0–0.03)
WBC # BLD AUTO: 6.39 K/UL (ref 3.9–12.7)
WBC # BLD AUTO: 6.47 K/UL (ref 3.9–12.7)

## 2022-03-16 PROCEDURE — 99220 PR INITIAL OBSERVATION CARE,LEVL III: ICD-10-PCS | Mod: ,,, | Performed by: PHYSICIAN ASSISTANT

## 2022-03-16 PROCEDURE — 36415 COLL VENOUS BLD VENIPUNCTURE: CPT | Performed by: PHYSICIAN ASSISTANT

## 2022-03-16 PROCEDURE — 25000003 PHARM REV CODE 250: Performed by: PHYSICIAN ASSISTANT

## 2022-03-16 PROCEDURE — 80048 BASIC METABOLIC PNL TOTAL CA: CPT | Performed by: PHYSICIAN ASSISTANT

## 2022-03-16 PROCEDURE — 99220 PR INITIAL OBSERVATION CARE,LEVL III: CPT | Mod: ,,, | Performed by: PHYSICIAN ASSISTANT

## 2022-03-16 PROCEDURE — 93010 ELECTROCARDIOGRAM REPORT: CPT | Mod: ,,, | Performed by: INTERNAL MEDICINE

## 2022-03-16 PROCEDURE — 99220 PR INITIAL OBSERVATION CARE,LEVL III: ICD-10-PCS | Mod: GC,,, | Performed by: INTERNAL MEDICINE

## 2022-03-16 PROCEDURE — 83735 ASSAY OF MAGNESIUM: CPT | Performed by: PHYSICIAN ASSISTANT

## 2022-03-16 PROCEDURE — 97161 PT EVAL LOW COMPLEX 20 MIN: CPT

## 2022-03-16 PROCEDURE — 85025 COMPLETE CBC W/AUTO DIFF WBC: CPT | Mod: 91 | Performed by: STUDENT IN AN ORGANIZED HEALTH CARE EDUCATION/TRAINING PROGRAM

## 2022-03-16 PROCEDURE — 93005 ELECTROCARDIOGRAM TRACING: CPT

## 2022-03-16 PROCEDURE — 99220 PR INITIAL OBSERVATION CARE,LEVL III: CPT | Mod: GC,,, | Performed by: INTERNAL MEDICINE

## 2022-03-16 PROCEDURE — 99900035 HC TECH TIME PER 15 MIN (STAT)

## 2022-03-16 PROCEDURE — 84484 ASSAY OF TROPONIN QUANT: CPT | Performed by: PHYSICIAN ASSISTANT

## 2022-03-16 PROCEDURE — 97166 OT EVAL MOD COMPLEX 45 MIN: CPT

## 2022-03-16 PROCEDURE — 84484 ASSAY OF TROPONIN QUANT: CPT | Mod: 91 | Performed by: STUDENT IN AN ORGANIZED HEALTH CARE EDUCATION/TRAINING PROGRAM

## 2022-03-16 PROCEDURE — U0002 COVID-19 LAB TEST NON-CDC: HCPCS | Performed by: STUDENT IN AN ORGANIZED HEALTH CARE EDUCATION/TRAINING PROGRAM

## 2022-03-16 PROCEDURE — 94640 AIRWAY INHALATION TREATMENT: CPT

## 2022-03-16 PROCEDURE — 83880 ASSAY OF NATRIURETIC PEPTIDE: CPT | Performed by: STUDENT IN AN ORGANIZED HEALTH CARE EDUCATION/TRAINING PROGRAM

## 2022-03-16 PROCEDURE — 80048 BASIC METABOLIC PNL TOTAL CA: CPT | Mod: 91 | Performed by: PHYSICIAN ASSISTANT

## 2022-03-16 PROCEDURE — G0378 HOSPITAL OBSERVATION PER HR: HCPCS

## 2022-03-16 PROCEDURE — 80053 COMPREHEN METABOLIC PANEL: CPT | Performed by: STUDENT IN AN ORGANIZED HEALTH CARE EDUCATION/TRAINING PROGRAM

## 2022-03-16 PROCEDURE — 97530 THERAPEUTIC ACTIVITIES: CPT

## 2022-03-16 PROCEDURE — 94761 N-INVAS EAR/PLS OXIMETRY MLT: CPT

## 2022-03-16 PROCEDURE — 25000242 PHARM REV CODE 250 ALT 637 W/ HCPCS: Performed by: PHYSICIAN ASSISTANT

## 2022-03-16 PROCEDURE — 85025 COMPLETE CBC W/AUTO DIFF WBC: CPT | Performed by: PHYSICIAN ASSISTANT

## 2022-03-16 PROCEDURE — 84100 ASSAY OF PHOSPHORUS: CPT | Performed by: PHYSICIAN ASSISTANT

## 2022-03-16 PROCEDURE — 93010 EKG 12-LEAD: ICD-10-PCS | Mod: ,,, | Performed by: INTERNAL MEDICINE

## 2022-03-16 PROCEDURE — 27000221 HC OXYGEN, UP TO 24 HOURS

## 2022-03-16 RX ORDER — FUROSEMIDE 40 MG/1
40 TABLET ORAL DAILY
Status: DISCONTINUED | OUTPATIENT
Start: 2022-03-16 | End: 2022-03-18 | Stop reason: HOSPADM

## 2022-03-16 RX ORDER — IBUPROFEN 200 MG
16 TABLET ORAL
Status: DISCONTINUED | OUTPATIENT
Start: 2022-03-16 | End: 2022-03-18 | Stop reason: HOSPADM

## 2022-03-16 RX ORDER — ONDANSETRON 8 MG/1
8 TABLET, ORALLY DISINTEGRATING ORAL EVERY 8 HOURS PRN
Status: DISCONTINUED | OUTPATIENT
Start: 2022-03-16 | End: 2022-03-18 | Stop reason: HOSPADM

## 2022-03-16 RX ORDER — AMLODIPINE BESYLATE 10 MG/1
10 TABLET ORAL DAILY
Status: DISCONTINUED | OUTPATIENT
Start: 2022-03-16 | End: 2022-03-18 | Stop reason: HOSPADM

## 2022-03-16 RX ORDER — TALC
6 POWDER (GRAM) TOPICAL NIGHTLY PRN
Status: DISCONTINUED | OUTPATIENT
Start: 2022-03-16 | End: 2022-03-18 | Stop reason: HOSPADM

## 2022-03-16 RX ORDER — SODIUM CHLORIDE 0.9 % (FLUSH) 0.9 %
10 SYRINGE (ML) INJECTION EVERY 8 HOURS PRN
Status: DISCONTINUED | OUTPATIENT
Start: 2022-03-16 | End: 2022-03-18 | Stop reason: HOSPADM

## 2022-03-16 RX ORDER — IBUPROFEN 200 MG
24 TABLET ORAL
Status: DISCONTINUED | OUTPATIENT
Start: 2022-03-16 | End: 2022-03-18 | Stop reason: HOSPADM

## 2022-03-16 RX ORDER — ATORVASTATIN CALCIUM 40 MG/1
80 TABLET, FILM COATED ORAL NIGHTLY
Status: DISCONTINUED | OUTPATIENT
Start: 2022-03-16 | End: 2022-03-18 | Stop reason: HOSPADM

## 2022-03-16 RX ORDER — FLUTICASONE FUROATE AND VILANTEROL 100; 25 UG/1; UG/1
1 POWDER RESPIRATORY (INHALATION) DAILY
Status: DISCONTINUED | OUTPATIENT
Start: 2022-03-16 | End: 2022-03-18 | Stop reason: HOSPADM

## 2022-03-16 RX ORDER — IPRATROPIUM BROMIDE AND ALBUTEROL SULFATE 2.5; .5 MG/3ML; MG/3ML
3 SOLUTION RESPIRATORY (INHALATION) EVERY 4 HOURS PRN
Status: DISCONTINUED | OUTPATIENT
Start: 2022-03-16 | End: 2022-03-18 | Stop reason: HOSPADM

## 2022-03-16 RX ORDER — PROCHLORPERAZINE EDISYLATE 5 MG/ML
5 INJECTION INTRAMUSCULAR; INTRAVENOUS EVERY 6 HOURS PRN
Status: DISCONTINUED | OUTPATIENT
Start: 2022-03-16 | End: 2022-03-18 | Stop reason: HOSPADM

## 2022-03-16 RX ORDER — GABAPENTIN 100 MG/1
100 CAPSULE ORAL NIGHTLY
Status: DISCONTINUED | OUTPATIENT
Start: 2022-03-16 | End: 2022-03-18 | Stop reason: HOSPADM

## 2022-03-16 RX ORDER — POLYETHYLENE GLYCOL 3350 17 G/17G
17 POWDER, FOR SOLUTION ORAL DAILY PRN
Status: DISCONTINUED | OUTPATIENT
Start: 2022-03-16 | End: 2022-03-18 | Stop reason: HOSPADM

## 2022-03-16 RX ORDER — METOPROLOL SUCCINATE 50 MG/1
50 TABLET, EXTENDED RELEASE ORAL DAILY
Status: DISCONTINUED | OUTPATIENT
Start: 2022-03-16 | End: 2022-03-18 | Stop reason: HOSPADM

## 2022-03-16 RX ORDER — INSULIN ASPART 100 [IU]/ML
0-5 INJECTION, SOLUTION INTRAVENOUS; SUBCUTANEOUS
Status: DISCONTINUED | OUTPATIENT
Start: 2022-03-16 | End: 2022-03-18 | Stop reason: HOSPADM

## 2022-03-16 RX ORDER — ISOSORBIDE MONONITRATE 30 MG/1
30 TABLET, EXTENDED RELEASE ORAL DAILY
Status: DISCONTINUED | OUTPATIENT
Start: 2022-03-16 | End: 2022-03-18 | Stop reason: HOSPADM

## 2022-03-16 RX ORDER — NAPROXEN SODIUM 220 MG/1
81 TABLET, FILM COATED ORAL DAILY
Status: DISCONTINUED | OUTPATIENT
Start: 2022-03-16 | End: 2022-03-18 | Stop reason: HOSPADM

## 2022-03-16 RX ORDER — SIMETHICONE 80 MG
1 TABLET,CHEWABLE ORAL 4 TIMES DAILY PRN
Status: DISCONTINUED | OUTPATIENT
Start: 2022-03-16 | End: 2022-03-18 | Stop reason: HOSPADM

## 2022-03-16 RX ORDER — GLUCAGON 1 MG
1 KIT INJECTION
Status: DISCONTINUED | OUTPATIENT
Start: 2022-03-16 | End: 2022-03-18 | Stop reason: HOSPADM

## 2022-03-16 RX ORDER — NALOXONE HCL 0.4 MG/ML
0.02 VIAL (ML) INJECTION
Status: DISCONTINUED | OUTPATIENT
Start: 2022-03-16 | End: 2022-03-18 | Stop reason: HOSPADM

## 2022-03-16 RX ORDER — ACETAMINOPHEN 325 MG/1
650 TABLET ORAL EVERY 4 HOURS PRN
Status: DISCONTINUED | OUTPATIENT
Start: 2022-03-16 | End: 2022-03-18 | Stop reason: HOSPADM

## 2022-03-16 RX ORDER — MAG HYDROX/ALUMINUM HYD/SIMETH 200-200-20
30 SUSPENSION, ORAL (FINAL DOSE FORM) ORAL 4 TIMES DAILY PRN
Status: DISCONTINUED | OUTPATIENT
Start: 2022-03-16 | End: 2022-03-18 | Stop reason: HOSPADM

## 2022-03-16 RX ORDER — PANTOPRAZOLE SODIUM 40 MG/1
40 TABLET, DELAYED RELEASE ORAL DAILY
Status: DISCONTINUED | OUTPATIENT
Start: 2022-03-16 | End: 2022-03-18 | Stop reason: HOSPADM

## 2022-03-16 RX ADMIN — GABAPENTIN 100 MG: 100 CAPSULE ORAL at 09:03

## 2022-03-16 RX ADMIN — SODIUM ZIRCONIUM CYCLOSILICATE 10 G: 10 POWDER, FOR SUSPENSION ORAL at 11:03

## 2022-03-16 RX ADMIN — PANTOPRAZOLE SODIUM 40 MG: 40 TABLET, DELAYED RELEASE ORAL at 08:03

## 2022-03-16 RX ADMIN — FLUTICASONE FUROATE AND VILANTEROL TRIFENATATE 1 PUFF: 100; 25 POWDER RESPIRATORY (INHALATION) at 09:03

## 2022-03-16 RX ADMIN — ASPIRIN 81 MG CHEWABLE TABLET 81 MG: 81 TABLET CHEWABLE at 08:03

## 2022-03-16 RX ADMIN — METOPROLOL SUCCINATE 50 MG: 50 TABLET, EXTENDED RELEASE ORAL at 08:03

## 2022-03-16 RX ADMIN — TICAGRELOR 90 MG: 90 TABLET ORAL at 08:03

## 2022-03-16 RX ADMIN — AMLODIPINE BESYLATE 10 MG: 10 TABLET ORAL at 08:03

## 2022-03-16 RX ADMIN — ISOSORBIDE MONONITRATE 30 MG: 30 TABLET, EXTENDED RELEASE ORAL at 11:03

## 2022-03-16 RX ADMIN — TIOTROPIUM BROMIDE INHALATION SPRAY 2 PUFF: 3.12 SPRAY, METERED RESPIRATORY (INHALATION) at 11:03

## 2022-03-16 RX ADMIN — TICAGRELOR 90 MG: 90 TABLET ORAL at 09:03

## 2022-03-16 RX ADMIN — ATORVASTATIN CALCIUM 80 MG: 40 TABLET, FILM COATED ORAL at 09:03

## 2022-03-16 RX ADMIN — FUROSEMIDE 40 MG: 40 TABLET ORAL at 08:03

## 2022-03-16 NOTE — PLAN OF CARE
[facility] - Telemetry Stepdown ([facility])  Discharge Assessment    Primary Care Provider: Khadra Dwyer MD     Discharge Assessment (most recent)     BRIEF DISCHARGE ASSESSMENT - 03/16/22 3130        Discharge Planning    Assessment Type Discharge Planning Brief Assessment     Resource/Environmental Concerns none     Support Systems Children     Equipment Currently Used at Home cane, quad;cane, straight;shower chair;wheelchair;grab bar;rollator     Current Living Arrangements home/apartment/condo     Patient/Family Anticipates Transition to inpatient rehabilitation facility     Patient/Family Anticipated Services at Transition skilled nursing     DME Needed Upon Discharge  none     Discharge Plan A Skilled Nursing Facility     Discharge Plan B Home Health                 Pt is a 76 y.o. female admitted with Chest pain and has a PMH of CAD, CHF and HTN. She was transferred from [facility] SN[facility] but lives alone in a single story house. Her dtr will be moving in temporarily while looking for a house to buy. She was bale to perform IADls and ADLs prior to her previous admission.   [facility] Dis[facility] Packet given to patient and/or family with understanding verbalized.   name and number and estimated discharge date written on white board in patient's room with request to call for any questions or concerns.  Will continue to follow for needs.  Shyam Salinas RN,BSN

## 2022-03-16 NOTE — ASSESSMENT & PLAN NOTE
Emphysema    - Known history of severe COPD (GOLD IV D PFT 2020). On home oxygen (2-3L)  - Home medications: albuterol (VENTOLIN HFA) 90 mcg/actuation inhaler, ipratropium-albuteroL (COMBIVENT RESPIMAT)  mcg/actuation inhaler,  umeclidinium (INCRUSE ELLIPTA) 62.5 mcg/actuation inhalation capsule, fluticasone-salmeterol diskus inhaler 250-50 mcg  - continue Breo and Spiriva inhaler   - Continue DuoNebs PRN

## 2022-03-16 NOTE — CONSULTS
[facility] - Telemetry Stepdown ([place] [address])  Cardiology  Consult Note    Patient Name: Marisol Kerr  MRN: [id number]  Admission Date: 3/15/2022  Hospital Length of Stay: 0 days  Code Status: Full Code   Attending Provider: Shelia Tracey MD   Consulting Provider: Becca Jacobson MD  Primary Care Physician: Khadar Dwyer MD  Principal Problem:Chest pain    Patient information was obtained from patient, relative(s), past medical records, ER records and primary team.     Inpatient consult to Cardiology  Consult performed by: Becca Jacobson MD  Consult ordered by: Darlene Gurrola PA-C        Subjective:     Chief Complaint:  Chest pain     HPI:   This is a 76 year old lady with known severe multivessel disease, HFpEF (EF 65% with G2DD) HTN, HLD, T2DM, CKD, and COPD on 2 L nasal cannula presenting from [facility] for chest pain. Patient states that she was lying down when she had a substernal chest tightness that improved with sublingual nitro. She was recently admitted on 2/12 to CCU for NSTEMI for severe multi-vessel disease requiring intervention. Plans for [facility] but hospital course was complicated by a rectus sheath hematoma deferring the procedure outpatient with Dr. Chappell on 3/17. Patient was discharged to SNF for debility until she would see Dr. Chappell. She states that in SNF, she had felt much better but occasionally had some chest pain that resolved with nitro. She states the pain that she felt today was worse than previous which prompted her to come to the hospital.     In the ED, VSS. K was elevated to 5.4 and Cr was 1.7. BNP of 209. Patient also had slightly elevated troponin 0.014 but much lower than previous admission. EKG showed no significant changes.     Upon chart review,   Echo on 2/12/22 showed:  · The left ventricle is normal in size with normal systolic function.  · The estimated ejection fraction is 65%.  · There is a minor septal wall motion abnormality.  · Severe left atrial enlargement.  · Grade  II left ventricular diastolic dysfunction.  · Normal right ventricular size with normal right ventricular systolic function.  · Normal central venous pressure (3 mmHg).      Angiogram, Coronary, with Left Heart Cath[02/10/22]  · The RPAV lesion was 100% stenosed.  · The RPDA lesion was 100% stenosed.  · The Prox Cx to Mid Cx lesion was 80% stenosed.  · The Dist Cx lesion was 70% stenosed.  · The 1st LPL lesion was 90% stenosed.  · The Prox RCA-2 lesion was 70% stenosed.  · The Prox RCA-1 lesion was 90% stenosed.  · The Mid LAD-2 lesion was 60% stenosed.  · The estimated blood loss was <50 mL.  · There was three vessel coronary artery disease.      Past Medical History:   Diagnosis Date    CAD (coronary artery disease)     Cancer     colon    CHF (congestive heart failure)     Colitis     Colon cancer     COPD (chronic obstructive pulmonary disease)     Decreased hearing     Diabetes mellitus     Diabetes mellitus, type 2     Hypercholesterolemia     Hypertension     Hypoxemia     Insomnia     Obesity     Osteoarthritis        Past Surgical History:   Procedure Laterality Date    ANGIOGRAM, CORONARY, WITH LEFT HEART CATHETERIZATION N/A 2/10/2022    Procedure: Angiogram, Coronary, with Left Heart Cath;  Surgeon: Cristian Benjamin MD;  Location: [facility] CATH/EP LAB;  Service: Cardiology;  Laterality: N/A;    CARDIAC CATHETERIZATION      CHOLECYSTECTOMY      COLECTOMY      CORONARY ANGIOPLASTY      CORONARY STENT PLACEMENT      EXTERNAL EAR SURGERY      EYE SURGERY      FLEXIBLE SIGMOIDOSCOPY N/A 9/19/2019    Procedure: SIGMOIDOSCOPY, FLEXIBLE;  Surgeon: Biju Kramer III, MD;  Location: [facility] ENDO;  Service: Endoscopy;  Laterality: N/A;    HYSTERECTOMY      partial       Review of patient's allergies indicates:   Allergen Reactions    Iodinated contrast media     Strawberries [strawberry] Hives and Itching       Current Facility-Administered Medications on File Prior to Encounter    Medication    [MAR Hold - Suspended Admission] acetaminophen tablet 650 mg    [MAR Hold - Suspended Admission] albuterol-ipratropium 2.5 mg-0.5 mg/3 mL nebulizer solution 3 mL    [MAR Hold - Suspended Admission] amLODIPine tablet 10 mg    [MAR Hold - Suspended Admission] aspirin chewable tablet 81 mg    [MAR Hold - Suspended Admission] atorvastatin tablet 80 mg    [MAR Hold - Suspended Admission] bromfenac 0.09 % ophthalmic solution 1 drop    [MAR Hold - Suspended Admission] calcium carbonate 200 mg calcium (500 mg) chewable tablet 500 mg    [MAR Hold - Suspended Admission] cholestyramine 4 gram packet 4 g    [MAR Hold - Suspended Admission] coenzyme Q10 100 mg    [MAR Hold - Suspended Admission] diclofenac sodium 1 % gel 4 g    [MAR Hold - Suspended Admission] ergocalciferol capsule 50,000 Units    [MAR Hold - Suspended Admission] fluticasone furoate-vilanteroL 100-25 mcg/dose diskus inhaler 1 puff    [MAR Hold - Suspended Admission] furosemide tablet 40 mg    [MAR Hold - Suspended Admission] gabapentin capsule 100 mg    [MAR Hold - Suspended Admission] HYDROcodone-acetaminophen 5-325 mg per tablet 1 tablet    [MAR Hold - Suspended Admission] loperamide capsule 2 mg    [MAR Hold - Suspended Admission] melatonin tablet 6 mg    [MAR Hold - Suspended Admission] metoprolol succinate (TOPROL-XL) 24 hr tablet 50 mg    [MAR Hold - Suspended Admission] miconazole NITRATE 2 % top powder    [MAR Hold - Suspended Admission] nitroGLYCERIN SL tablet 0.4 mg    [MAR Hold - Suspended Admission] omega-3 fatty acids-fish oil 340-1,000 mg capsule 1 capsule    [MAR Hold - Suspended Admission] ondansetron disintegrating tablet 8 mg    [MAR Hold - Suspended Admission] pantoprazole EC tablet 40 mg    [MAR Hold - Suspended Admission] psyllium husk (aspartame) 3.4 gram packet 1 packet    [MAR Hold - Suspended Admission] senna-docusate 8.6-50 mg per tablet 1 tablet    [MAR Hold - Suspended Admission] simethicone  chewable tablet 80 mg    [COMPLETED] sodium bicarbonate tablet 650 mg    [] sodium polystyrene 15 gram/60 mL suspension 30 g    [MAR Hold - Suspended Admission] ticagrelor tablet 90 mg    [MAR Hold - Suspended Admission] tiotropium bromide 2.5 mcg/actuation inhaler 2 puff    [MAR Hold - Suspended Admission] triamcinolone acetonide 0.1% cream    [DISCONTINUED] lisinopriL tablet 10 mg     Current Outpatient Medications on File Prior to Encounter   Medication Sig    amLODIPine (NORVASC) 10 MG tablet Take 1 tablet (10 mg total) by mouth once daily.    atorvastatin (LIPITOR) 80 MG tablet Take 1 tablet (80 mg total) by mouth every evening.    furosemide (LASIX) 20 MG tablet Take 2 tablets (40 mg total) by mouth once daily.    lisinopriL 10 MG tablet Take 1 tablet (10 mg total) by mouth once daily.    metoprolol succinate (TOPROL-XL) 50 MG 24 hr tablet Take 1 tablet (50 mg total) by mouth once daily.    pantoprazole (PROTONIX) 40 MG tablet Take 1 tablet (40 mg total) by mouth once daily.    acetaminophen (TYLENOL) 325 MG tablet Take 2 tablets (650 mg total) by mouth every 4 (four) hours as needed.    albuterol (VENTOLIN HFA) 90 mcg/actuation inhaler Inhale 2 puffs into the lungs every 6 (six) hours as needed for Wheezing or Shortness of Breath. Rescue    aspirin (ECOTRIN) 81 MG EC tablet Take 81 mg by mouth every morning.     bromfenac (PROLENSA) 0.07 % Drop Place 1 drop into both eyes daily as needed.    coenzyme Q10 100 mg capsule Take 100 mg by mouth every morning.    dextran 70-hypromellose 0.1-0.3 % Drop Place 1 drop into both eyes daily as needed.    ergocalciferol (VITAMIN D2) 50,000 unit Cap Take 1 capsule (50,000 Units total) by mouth every 7 days.    fish oil-omega-3 fatty acids 300-1,000 mg capsule Take 1 capsule by mouth every morning.    FLAXSEED OIL ORAL Take 1,200 mg by mouth every morning.    fluticasone-salmeterol diskus inhaler 250-50 mcg Inhale 1 puff into the lungs 2 (two)  times daily.    ipratropium-albuteroL (COMBIVENT RESPIMAT)  mcg/actuation inhaler Inhale 2 puffs into the lungs every 4 (four) hours as needed for Shortness of Breath. Rescue    NARCAN 4 mg/actuation Spry     nitroGLYCERIN 0.2 mg/hr TD PT24 (NITRODUR) 0.2 mg/hr APPLY 1 PATCH TOPICALLY ONCE DAILY TO LEG.    nitroGLYCERIN 0.4 MG/DOSE TL SPRY (NITROLINGUAL) 400 mcg/spray spray USE ONE SPRAY UNDER THE TONGUE EVERY 5 MINUTES AS NEEDED FOR CHEST PAIN.  DO NOT EXCEED A TOTAL OF 3 SPRAYS IN 15 MINUTES    ondansetron (ZOFRAN-ODT) 4 MG TbDL Take 2 tablets (8 mg total) by mouth every 6 (six) hours as needed.    PNV NO.115/IRON FUMARATE/FA (PRENATAL #115-IRON-FOLIC ACID ORAL) Take 1 tablet by mouth every morning.     temazepam (RESTORIL) 15 mg Cap Take 1 capsule (15 mg total) by mouth every evening.    ticagrelor (BRILINTA) 90 mg tablet Take 1 tablet (90 mg total) by mouth 2 (two) times a day.    triamcinolone acetonide 0.1% (KENALOG) 0.1 % cream Apply topically 2 (two) times daily. For legs.    umeclidinium (INCRUSE ELLIPTA) 62.5 mcg/actuation inhalation capsule Inhale 1 capsule (63 mcg total) into the lungs every morning. Controller    vit C/E/Zn/coppr/lutein/zeaxan (PRESERVISION AREDS-2 ORAL) Take 1 capsule by mouth every morning.    [DISCONTINUED] benazepriL (LOTENSIN) 40 MG tablet Take 1 tablet (40 mg total) by mouth once daily.    [DISCONTINUED] lovastatin (MEVACOR) 40 MG tablet Take 0.5 tablets (20 mg total) by mouth every evening. (Patient taking differently: Take 40 mg by mouth every other day.)     Family History       Problem Relation (Age of Onset)    Febrile seizures Mother    Heart failure Father          Tobacco Use    Smoking status: Former Smoker     Packs/day: 3.00     Years: 50.00     Pack years: 150.00     Types: Cigarettes     Start date: 3/30/1964     Quit date: 2006     Years since quittin.0    Smokeless tobacco: Never Used    Tobacco comment: ex smoker 15 years   Substance  and Sexual Activity    Alcohol use: Yes    Drug use: No    Sexual activity: Never     Review of Systems   Constitutional: Negative for chills, diaphoresis, fever, malaise/fatigue, weight gain and weight loss.   HENT:  Negative for ear pain, nosebleeds and odynophagia.    Eyes:  Negative for blurred vision, double vision, vision loss in left eye, vision loss in right eye and visual disturbance.   Cardiovascular:  Positive for chest pain and dyspnea on exertion. Negative for leg swelling, near-syncope, palpitations and syncope.   Respiratory:  Positive for shortness of breath. Negative for wheezing.    Hematologic/Lymphatic: Negative for bleeding problem. Does not bruise/bleed easily.   Skin:  Negative for poor wound healing and rash.   Musculoskeletal:  Negative for back pain, joint pain and neck pain.   Gastrointestinal:  Negative for abdominal pain, change in bowel habit, constipation, diarrhea, hematemesis, hematochezia, melena, nausea and vomiting.   Genitourinary:  Negative for dysuria, flank pain, hematuria and pelvic pain.   Neurological:  Negative for dizziness, focal weakness, headaches, light-headedness, seizures and weakness.   Psychiatric/Behavioral:  Negative for memory loss. The patient does not have insomnia.    Objective:     Vital Signs (Most Recent):  Temp: 97.7 °F (36.5 °C) (03/16/22 0435)  Pulse: 60 (03/16/22 0713)  Resp: 19 (03/16/22 0435)  BP: (!) 140/60 (03/16/22 0435)  SpO2: 100 % (03/16/22 0716)   Vital Signs (24h Range):  Temp:  [97.7 °F (36.5 °C)-98.2 °F (36.8 °C)] 98.2 °F (36.8 °C)  Pulse:  [55-69] 64  Resp:  [14-20] 16  SpO2:  [93 %-100 %] 100 %  BP: (108-157)/(40-70) 145/65     Weight: (!) 187.4 kg (413 lb 2.3 oz)  Body mass index is 73.18 kg/m².    SpO2: 100 %  O2 Device (Oxygen Therapy): nasal cannula      Intake/Output Summary (Last 24 hours) at 3/16/2022 0843  Last data filed at 3/16/2022 0435  Gross per 24 hour   Intake --   Output 400 ml   Net -400 ml       Lines/Drains/Airways        Drain  Duration             Female External Urinary Catheter 02/22/22 1900 21 days              Peripheral Intravenous Line  Duration                  Peripheral IV - Single Lumen 03/16/22 0020 20 G Left;Posterior Hand <1 day                    Physical Exam  Vitals and nursing note reviewed.   Constitutional:       General: She is not in acute distress.     Appearance: Normal appearance. She is obese. She is not ill-appearing, toxic-appearing or diaphoretic.   HENT:      Head: Normocephalic and atraumatic.      Right Ear: External ear normal.      Left Ear: External ear normal.   Eyes:      General: No scleral icterus.        Right eye: No discharge.         Left eye: No discharge.      Extraocular Movements: Extraocular movements intact.      Conjunctiva/sclera: Conjunctivae normal.   Cardiovascular:      Rate and Rhythm: Normal rate and regular rhythm.      Pulses: Normal pulses.      Heart sounds: Normal heart sounds.   Pulmonary:      Effort: Pulmonary effort is normal. No respiratory distress.      Breath sounds: No wheezing or rales.   Chest:      Chest wall: No tenderness.   Abdominal:      General: There is no distension.      Palpations: There is mass.      Tenderness: There is no abdominal tenderness. There is no guarding.      Comments: Small solid abdominal mass where rectus sheath hematoma was located   Musculoskeletal:         General: No swelling or tenderness. Normal range of motion.      Cervical back: Normal range of motion and neck supple.      Right lower leg: No edema.      Left lower leg: No edema.   Skin:     General: Skin is warm and dry.      Capillary Refill: Capillary refill takes less than 2 seconds.      Findings: Erythema (lower extermity venous stasis) present.   Neurological:      General: No focal deficit present.      Mental Status: She is alert. Mental status is at baseline.   Psychiatric:         Mood and Affect: Mood normal.         Behavior: Behavior normal.        Significant Labs: CMP   Recent Labs   Lab 03/15/22  0415 03/16/22  0025 03/16/22  0439    140 143   K 5.4* 5.3* 5.2*    110 112*   CO2 21* 20* 22*   GLU 90 106 96   BUN 72* 74* 70*   CREATININE 1.7* 1.8* 1.6*   CALCIUM 9.1 8.8 8.8   PROT  --  5.6*  --    ALBUMIN  --  2.9*  --    BILITOT  --  0.4  --    ALKPHOS  --  95  --    AST  --  14  --    ALT  --  13  --    ANIONGAP 8 10 9   ESTGFRAFRICA 33.8* 31.5* 36.3*   EGFRNONAA 29.3* 27.3* 31.5*   , CBC   Recent Labs   Lab 03/15/22  0415 03/16/22  0025 03/16/22  0439   WBC 5.99 6.39 6.47   HGB 8.5* 8.3* 8.8*   HCT 28.8* 27.4* 29.4*   * 123* 135*   , Troponin   Recent Labs   Lab 03/16/22 0025 03/16/22 0439   TROPONINI 0.014 0.015   , and All pertinent lab results from the last 24 hours have been reviewed.    Significant Imaging: Echocardiogram: Transthoracic echo (TTE) complete ([facility] Only):   Results for orders placed or performed during the hospital encounter of 02/11/22   Echo   Result Value Ref Range    Ascending aorta 2.24 cm    STJ 2.11 cm    AV mean gradient 8 mmHg    Ao peak asim 1.80 m/s    Ao VTI 36.43 cm    IVRT 91.34 msec    IVS 0.86 0.6 - 1.1 cm    LA size 4.33 cm    Left Atrium Major Axis 5.64 cm    Left Atrium Minor Axis 5.64 cm    LVIDd 4.74 3.5 - 6.0 cm    LVIDs 2.64 2.1 - 4.0 cm    LVOT diameter 2.00 cm    LVOT peak VTI 27.39 cm    Posterior Wall 0.89 0.6 - 1.1 cm    MV Peak A Asim 1.03 m/s    E wave deceleration time 194.92 msec    MV Peak E Asim 0.94 m/s    RA Major Axis 4.52 cm    RA Width 3.45 cm    RVDD 2.85 cm    Sinus 2.57 cm    TAPSE 3.70 cm    TDI LATERAL 0.05 m/s    TDI SEPTAL 0.05 m/s    LA WIDTH 4.64 cm    MV stenosis pressure 1/2 time 56.53 ms    LV Diastolic Volume 104.66 mL    LV Systolic Volume 25.44 mL    RV S' 20.60 cm/s    LVOT peak asim 1.21 m/s    LA volume (mod) 78.65 cm3    MV mean gradient 1 mmHg    MV peak gradient 5 mmHg    MV VTI 28.95 cm    LV LATERAL E/E' RATIO 18.80 m/s    LV SEPTAL E/E' RATIO 18.80 m/s     FS 44 %    LA volume 96.32 cm3    LV mass 139.43 g    Left Ventricle Relative Wall Thickness 0.38 cm    AV valve area 2.36 cm2    AV Velocity Ratio 0.67     AV index (prosthetic) 0.75     MV valve area p 1/2 method 3.89 cm2    MV valve area by continuity eq 2.97 cm2    E/A ratio 0.91     Mean e' 0.05 m/s    LVOT area 3.1 cm2    LVOT stroke volume 86.00 cm3    AV peak gradient 13 mmHg    E/E' ratio 18.80 m/s    LV Systolic Volume Index 13.3 mL/m2    LV Diastolic Volume Index 54.51 mL/m2    LA Volume Index 50.2 mL/m2    LV Mass Index 73 g/m2    LA Volume Index (Mod) 41.0 mL/m2    BSA 1.99 m2    Right Atrial Pressure (from IVC) 3 mmHg    EF 65 %    Narrative    · The left ventricle is normal in size with normal systolic function.  · The estimated ejection fraction is 65%.  · There is a minor septal wall motion abnormality.  · Severe left atrial enlargement.  · Grade II left ventricular diastolic dysfunction.  · Normal right ventricular size with normal right ventricular systolic   function.  · Normal central venous pressure (3 mmHg).        Assessment and Plan:     Coronary artery disease, multivessel with history of previous PCI  NSTEMI  Unstable Angina    76 yoF with known severe multivessel disease, HTN, HFpEF presenting with chest pain. Patient had plans for outpatient PCI intervention with Dr. Chappell on 3/17. Patient has known lesiosn in the RPAV, RPDA, Circumflex, LPL, RCA, and LAD. ANUSHKA of 6 and Genet of 136. No angiogram imaging available at [facility]. Likely angina from known vessel disease with future plans for intervention.     Plan:  - IC consulted and appreciate recommendations  - will need disk of angiogram sent from [place]  - Continuous Telemetry monitoring  - continue 81 mg ASA qd  - Atorvastatin 80mg qhs  - Metoprolol 50 mg XL  - continue Ticagrelor 90mg bid  - continue imdur 30 mg qd        VTE Risk Mitigation (From admission, onward)         Ordered     IP VTE HIGH RISK PATIENT  Once         03/16/22  0202     Place sequential compression device  Until discontinued         03/16/22 0202                Thank you for your consult. I will follow-up with patient. Please contact us if you have any additional questions.    Becca Jacobson MD  Cardiology   [facility] - Telemetry Stepdown ([place])

## 2022-03-16 NOTE — ASSESSMENT & PLAN NOTE
- K 5.4, given lokelma at SNF and had multiple bowel movements  - no EKG changes  - monitor with daily labs

## 2022-03-16 NOTE — CARE UPDATE
76 y.o. who was admitted to hospital medicine for chest pain. Patient stable during my visit, reporting resolution of chest pain following multiple doses of nitro. troponin unremarkable at this time. Cardiology evaluated and recommended consult to interventional cardiology. Cards also requesting outpatient records from prior catherization, CM aware and records have been requested. Patient to return to SNF when medically appropriate.     JOE Love, PA-C  Department of Hospital Medicine  [facility]

## 2022-03-16 NOTE — SUBJECTIVE & OBJECTIVE
Past Medical History:   Diagnosis Date    CAD (coronary artery disease)     Cancer     colon    CHF (congestive heart failure)     Colitis     Colon cancer     COPD (chronic obstructive pulmonary disease)     Decreased hearing     Diabetes mellitus     Diabetes mellitus, type 2     Hypercholesterolemia     Hypertension     Hypoxemia     Insomnia     Obesity     Osteoarthritis        Past Surgical History:   Procedure Laterality Date    ANGIOGRAM, CORONARY, WITH LEFT HEART CATHETERIZATION N/A 2/10/2022    Procedure: Angiogram, Coronary, with Left Heart Cath;  Surgeon: Cristian Benjamin MD;  Location: [facility] CATH/EP LAB;  Service: Cardiology;  Laterality: N/A;    CARDIAC CATHETERIZATION      CHOLECYSTECTOMY      COLECTOMY      CORONARY ANGIOPLASTY      CORONARY STENT PLACEMENT      EXTERNAL EAR SURGERY      EYE SURGERY      FLEXIBLE SIGMOIDOSCOPY N/A 9/19/2019    Procedure: SIGMOIDOSCOPY, FLEXIBLE;  Surgeon: Biju Kramer III, MD;  Location: [facility] ENDO;  Service: Endoscopy;  Laterality: N/A;    HYSTERECTOMY      partial       Review of patient's allergies indicates:   Allergen Reactions    Iodinated contrast media     Strawberries [strawberry] Hives and Itching       Current Facility-Administered Medications on File Prior to Encounter   Medication    [MAR Hold - Suspended Admission] acetaminophen tablet 650 mg    [MAR Hold - Suspended Admission] albuterol-ipratropium 2.5 mg-0.5 mg/3 mL nebulizer solution 3 mL    [MAR Hold - Suspended Admission] amLODIPine tablet 10 mg    [MAR Hold - Suspended Admission] aspirin chewable tablet 81 mg    [MAR Hold - Suspended Admission] atorvastatin tablet 80 mg    [MAR Hold - Suspended Admission] bromfenac 0.09 % ophthalmic solution 1 drop    [MAR Hold - Suspended Admission] calcium carbonate 200 mg calcium (500 mg) chewable tablet 500 mg    [MAR Hold - Suspended Admission] cholestyramine 4 gram packet 4 g    [MAR Hold - Suspended Admission] coenzyme Q10 100 mg    [MAR Hold -  Suspended Admission] diclofenac sodium 1 % gel 4 g    [MAR Hold - Suspended Admission] ergocalciferol capsule 50,000 Units    [MAR Hold - Suspended Admission] fluticasone furoate-vilanteroL 100-25 mcg/dose diskus inhaler 1 puff    [MAR Hold - Suspended Admission] furosemide tablet 40 mg    [MAR Hold - Suspended Admission] gabapentin capsule 100 mg    [MAR Hold - Suspended Admission] HYDROcodone-acetaminophen 5-325 mg per tablet 1 tablet    [MAR Hold - Suspended Admission] loperamide capsule 2 mg    [MAR Hold - Suspended Admission] melatonin tablet 6 mg    [MAR Hold - Suspended Admission] metoprolol succinate (TOPROL-XL) 24 hr tablet 50 mg    [MAR Hold - Suspended Admission] miconazole NITRATE 2 % top powder    [MAR Hold - Suspended Admission] nitroGLYCERIN SL tablet 0.4 mg    [MAR Hold - Suspended Admission] omega-3 fatty acids-fish oil 340-1,000 mg capsule 1 capsule    [MAR Hold - Suspended Admission] ondansetron disintegrating tablet 8 mg    [MAR Hold - Suspended Admission] pantoprazole EC tablet 40 mg    [MAR Hold - Suspended Admission] psyllium husk (aspartame) 3.4 gram packet 1 packet    [MAR Hold - Suspended Admission] senna-docusate 8.6-50 mg per tablet 1 tablet    [MAR Hold - Suspended Admission] simethicone chewable tablet 80 mg    [COMPLETED] sodium bicarbonate tablet 650 mg    [] sodium polystyrene 15 gram/60 mL suspension 30 g    [MAR Hold - Suspended Admission] ticagrelor tablet 90 mg    [MAR Hold - Suspended Admission] tiotropium bromide 2.5 mcg/actuation inhaler 2 puff    [MAR Hold - Suspended Admission] triamcinolone acetonide 0.1% cream    [DISCONTINUED] lisinopriL tablet 10 mg     Current Outpatient Medications on File Prior to Encounter   Medication Sig    amLODIPine (NORVASC) 10 MG tablet Take 1 tablet (10 mg total) by mouth once daily.    atorvastatin (LIPITOR) 80 MG tablet Take 1 tablet (80 mg total) by mouth every evening.    furosemide (LASIX) 20 MG tablet Take 2 tablets (40 mg total)  by mouth once daily.    lisinopriL 10 MG tablet Take 1 tablet (10 mg total) by mouth once daily.    metoprolol succinate (TOPROL-XL) 50 MG 24 hr tablet Take 1 tablet (50 mg total) by mouth once daily.    pantoprazole (PROTONIX) 40 MG tablet Take 1 tablet (40 mg total) by mouth once daily.    acetaminophen (TYLENOL) 325 MG tablet Take 2 tablets (650 mg total) by mouth every 4 (four) hours as needed.    albuterol (VENTOLIN HFA) 90 mcg/actuation inhaler Inhale 2 puffs into the lungs every 6 (six) hours as needed for Wheezing or Shortness of Breath. Rescue    aspirin (ECOTRIN) 81 MG EC tablet Take 81 mg by mouth every morning.     bromfenac (PROLENSA) 0.07 % Drop Place 1 drop into both eyes daily as needed.    coenzyme Q10 100 mg capsule Take 100 mg by mouth every morning.    dextran 70-hypromellose 0.1-0.3 % Drop Place 1 drop into both eyes daily as needed.    ergocalciferol (VITAMIN D2) 50,000 unit Cap Take 1 capsule (50,000 Units total) by mouth every 7 days.    fish oil-omega-3 fatty acids 300-1,000 mg capsule Take 1 capsule by mouth every morning.    FLAXSEED OIL ORAL Take 1,200 mg by mouth every morning.    fluticasone-salmeterol diskus inhaler 250-50 mcg Inhale 1 puff into the lungs 2 (two) times daily.    ipratropium-albuteroL (COMBIVENT RESPIMAT)  mcg/actuation inhaler Inhale 2 puffs into the lungs every 4 (four) hours as needed for Shortness of Breath. Rescue    NARCAN 4 mg/actuation Spry     nitroGLYCERIN 0.2 mg/hr TD PT24 (NITRODUR) 0.2 mg/hr APPLY 1 PATCH TOPICALLY ONCE DAILY TO LEG.    nitroGLYCERIN 0.4 MG/DOSE TL SPRY (NITROLINGUAL) 400 mcg/spray spray USE ONE SPRAY UNDER THE TONGUE EVERY 5 MINUTES AS NEEDED FOR CHEST PAIN.  DO NOT EXCEED A TOTAL OF 3 SPRAYS IN 15 MINUTES    ondansetron (ZOFRAN-ODT) 4 MG TbDL Take 2 tablets (8 mg total) by mouth every 6 (six) hours as needed.    PNV NO.115/IRON FUMARATE/FA (PRENATAL #115-IRON-FOLIC ACID ORAL) Take 1 tablet by mouth every morning.     temazepam  (RESTORIL) 15 mg Cap Take 1 capsule (15 mg total) by mouth every evening.    ticagrelor (BRILINTA) 90 mg tablet Take 1 tablet (90 mg total) by mouth 2 (two) times a day.    triamcinolone acetonide 0.1% (KENALOG) 0.1 % cream Apply topically 2 (two) times daily. For legs.    umeclidinium (INCRUSE ELLIPTA) 62.5 mcg/actuation inhalation capsule Inhale 1 capsule (63 mcg total) into the lungs every morning. Controller    vit C/E/Zn/coppr/lutein/zeaxan (PRESERVISION AREDS-2 ORAL) Take 1 capsule by mouth every morning.    [DISCONTINUED] benazepriL (LOTENSIN) 40 MG tablet Take 1 tablet (40 mg total) by mouth once daily.    [DISCONTINUED] lovastatin (MEVACOR) 40 MG tablet Take 0.5 tablets (20 mg total) by mouth every evening. (Patient taking differently: Take 40 mg by mouth every other day.)     Family History       Problem Relation (Age of Onset)    Febrile seizures Mother    Heart failure Father          Tobacco Use    Smoking status: Former Smoker     Packs/day: 3.00     Years: 50.00     Pack years: 150.00     Types: Cigarettes     Start date: 3/30/1964     Quit date: 2006     Years since quittin.0    Smokeless tobacco: Never Used    Tobacco comment: ex smoker 15 years   Substance and Sexual Activity    Alcohol use: Yes    Drug use: No    Sexual activity: Never     Review of Systems   Constitutional: Negative for chills, diaphoresis, fever, malaise/fatigue, weight gain and weight loss.   HENT:  Negative for ear pain, nosebleeds and odynophagia.    Eyes:  Negative for blurred vision, double vision, vision loss in left eye, vision loss in right eye and visual disturbance.   Cardiovascular:  Positive for chest pain and dyspnea on exertion. Negative for leg swelling, near-syncope, palpitations and syncope.   Respiratory:  Positive for shortness of breath. Negative for wheezing.    Hematologic/Lymphatic: Negative for bleeding problem. Does not bruise/bleed easily.   Skin:  Negative for poor wound healing and rash.    Musculoskeletal:  Negative for back pain, joint pain and neck pain.   Gastrointestinal:  Negative for abdominal pain, change in bowel habit, constipation, diarrhea, hematemesis, hematochezia, melena, nausea and vomiting.   Genitourinary:  Negative for dysuria, flank pain, hematuria and pelvic pain.   Neurological:  Negative for dizziness, focal weakness, headaches, light-headedness, seizures and weakness.   Psychiatric/Behavioral:  Negative for memory loss. The patient does not have insomnia.    Objective:     Vital Signs (Most Recent):  Temp: 97.7 °F (36.5 °C) (03/16/22 0435)  Pulse: 60 (03/16/22 0713)  Resp: 19 (03/16/22 0435)  BP: (!) 140/60 (03/16/22 0435)  SpO2: 100 % (03/16/22 0716)   Vital Signs (24h Range):  Temp:  [97.7 °F (36.5 °C)-98.2 °F (36.8 °C)] 98.2 °F (36.8 °C)  Pulse:  [55-69] 64  Resp:  [14-20] 16  SpO2:  [93 %-100 %] 100 %  BP: (108-157)/(40-70) 145/65     Weight: (!) 187.4 kg (413 lb 2.3 oz)  Body mass index is 73.18 kg/m².    SpO2: 100 %  O2 Device (Oxygen Therapy): nasal cannula      Intake/Output Summary (Last 24 hours) at 3/16/2022 0850  Last data filed at 3/16/2022 0435  Gross per 24 hour   Intake --   Output 400 ml   Net -400 ml       Lines/Drains/Airways       Drain  Duration             Female External Urinary Catheter 02/22/22 1900 21 days              Peripheral Intravenous Line  Duration                  Peripheral IV - Single Lumen 03/16/22 0020 20 G Left;Posterior Hand <1 day                    Physical Exam  Vitals and nursing note reviewed.   Constitutional:       General: She is not in acute distress.     Appearance: Normal appearance. She is obese. She is not ill-appearing, toxic-appearing or diaphoretic.   HENT:      Head: Normocephalic and atraumatic.      Right Ear: External ear normal.      Left Ear: External ear normal.   Eyes:      General: No scleral icterus.        Right eye: No discharge.         Left eye: No discharge.      Extraocular Movements: Extraocular  movements intact.      Conjunctiva/sclera: Conjunctivae normal.   Cardiovascular:      Rate and Rhythm: Normal rate and regular rhythm.      Pulses: Normal pulses.      Heart sounds: Normal heart sounds.   Pulmonary:      Effort: Pulmonary effort is normal. No respiratory distress.      Breath sounds: No wheezing or rales.   Chest:      Chest wall: No tenderness.   Abdominal:      General: There is no distension.      Palpations: There is mass.      Tenderness: There is no abdominal tenderness. There is no guarding.      Comments: Small solid abdominal mass where rectus sheath hematoma was located   Musculoskeletal:         General: No swelling or tenderness. Normal range of motion.      Cervical back: Normal range of motion and neck supple.      Right lower leg: No edema.      Left lower leg: No edema.   Skin:     General: Skin is warm and dry.      Capillary Refill: Capillary refill takes less than 2 seconds.      Findings: Erythema (lower extermity venous stasis) present.   Neurological:      General: No focal deficit present.      Mental Status: She is alert. Mental status is at baseline.   Psychiatric:         Mood and Affect: Mood normal.         Behavior: Behavior normal.       Significant Labs: CMP   Recent Labs   Lab 03/15/22  0415 03/16/22  0025 03/16/22  0439    140 143   K 5.4* 5.3* 5.2*    110 112*   CO2 21* 20* 22*   GLU 90 106 96   BUN 72* 74* 70*   CREATININE 1.7* 1.8* 1.6*   CALCIUM 9.1 8.8 8.8   PROT  --  5.6*  --    ALBUMIN  --  2.9*  --    BILITOT  --  0.4  --    ALKPHOS  --  95  --    AST  --  14  --    ALT  --  13  --    ANIONGAP 8 10 9   ESTGFRAFRICA 33.8* 31.5* 36.3*   EGFRNONAA 29.3* 27.3* 31.5*   , CBC   Recent Labs   Lab 03/15/22  0415 03/16/22  0025 03/16/22  0439   WBC 5.99 6.39 6.47   HGB 8.5* 8.3* 8.8*   HCT 28.8* 27.4* 29.4*   * 123* 135*   , Troponin   Recent Labs   Lab 03/16/22 0025 03/16/22 0439   TROPONINI 0.014 0.015   , and All pertinent lab results from the  last 24 hours have been reviewed.    Significant Imaging: Echocardiogram: Transthoracic echo (TTE) complete (Cupid Only):   Results for orders placed or performed during the hospital encounter of 02/11/22   Echo   Result Value Ref Range    Ascending aorta 2.24 cm    STJ 2.11 cm    AV mean gradient 8 mmHg    Ao peak asim 1.80 m/s    Ao VTI 36.43 cm    IVRT 91.34 msec    IVS 0.86 0.6 - 1.1 cm    LA size 4.33 cm    Left Atrium Major Axis 5.64 cm    Left Atrium Minor Axis 5.64 cm    LVIDd 4.74 3.5 - 6.0 cm    LVIDs 2.64 2.1 - 4.0 cm    LVOT diameter 2.00 cm    LVOT peak VTI 27.39 cm    Posterior Wall 0.89 0.6 - 1.1 cm    MV Peak A Asim 1.03 m/s    E wave deceleration time 194.92 msec    MV Peak E Asim 0.94 m/s    RA Major Axis 4.52 cm    RA Width 3.45 cm    RVDD 2.85 cm    Sinus 2.57 cm    TAPSE 3.70 cm    TDI LATERAL 0.05 m/s    TDI SEPTAL 0.05 m/s    LA WIDTH 4.64 cm    MV stenosis pressure 1/2 time 56.53 ms    LV Diastolic Volume 104.66 mL    LV Systolic Volume 25.44 mL    RV S' 20.60 cm/s    LVOT peak asim 1.21 m/s    LA volume (mod) 78.65 cm3    MV mean gradient 1 mmHg    MV peak gradient 5 mmHg    MV VTI 28.95 cm    LV LATERAL E/E' RATIO 18.80 m/s    LV SEPTAL E/E' RATIO 18.80 m/s    FS 44 %    LA volume 96.32 cm3    LV mass 139.43 g    Left Ventricle Relative Wall Thickness 0.38 cm    AV valve area 2.36 cm2    AV Velocity Ratio 0.67     AV index (prosthetic) 0.75     MV valve area p 1/2 method 3.89 cm2    MV valve area by continuity eq 2.97 cm2    E/A ratio 0.91     Mean e' 0.05 m/s    LVOT area 3.1 cm2    LVOT stroke volume 86.00 cm3    AV peak gradient 13 mmHg    E/E' ratio 18.80 m/s    LV Systolic Volume Index 13.3 mL/m2    LV Diastolic Volume Index 54.51 mL/m2    LA Volume Index 50.2 mL/m2    LV Mass Index 73 g/m2    LA Volume Index (Mod) 41.0 mL/m2    BSA 1.99 m2    Right Atrial Pressure (from IVC) 3 mmHg    EF 65 %    Narrative    · The left ventricle is normal in size with normal systolic function.  · The  estimated ejection fraction is 65%.  · There is a minor septal wall motion abnormality.  · Severe left atrial enlargement.  · Grade II left ventricular diastolic dysfunction.  · Normal right ventricular size with normal right ventricular systolic   function.  · Normal central venous pressure (3 mmHg).

## 2022-03-16 NOTE — PT/OT/SLP EVAL
Physical Therapy Evaluation    Patient Name:  Marisol Kerr   MRN:  [id number]    Recommendations:     Discharge Recommendations:  nursing facility, skilled   Discharge Equipment Recommendations: none   Barriers to discharge: Decreased caregiver support    Assessment:     Marisol Kerr is a 76 y.o. female admitted with a medical diagnosis of Chest pain.  She presents with the following impairments/functional limitations:  weakness, impaired endurance, impaired self care skills, impaired functional mobilty, gait instability, impaired balance, decreased lower extremity function, impaired cardiopulmonary response to activity. Pt transferred from [facility] where she was receiving PT and OT. Pt was transferring and ambulating with a RW with SBA while on the SNF unit. Pt currently requires Maira for transfers and CGA for very short distances with RW, d.t pt c/o of 10/10 R L/e pain when ambulating only. Pt also with very low endurance. Pt will benefit from returning to snf rehab once medically stable to d/c from acute.    Rehab Prognosis: Good; patient would benefit from acute skilled PT services to address these deficits and reach maximum level of function.    Recent Surgery: * No surgery found *      Plan:     During this hospitalization, patient to be seen 4 x/week to address the identified rehab impairments via gait training, therapeutic activities, therapeutic exercises, neuromuscular re-education and progress toward the following goals:    · Plan of Care Expires:  04/15/22    Subjective     Chief Complaint: pain and fatigue  Patient/Family Comments/goals: to gain (I)  Pain/Comfort:  · Pain Rating 1: 0/10  · Pain Rating Post-Intervention 1: 10/10  · Pain Rating 2: 10/10  · Location - Side 2: Left  · Location - Orientation 2: generalized  · Location 2: leg  · Pain Addressed 2: Reposition, Distraction, Cessation of Activity, Nurse notified    Patients cultural, spiritual, Christian conflicts given the current situation:  no    Living Environment:  The patient lives alone, [facility], [address], [place]. Does not drive.   Prior to admission, patients level of function was modified independent with use of canes, rollator does not fit into bathroom.  Equipment used at home: shower chair,rollator,cane, straight (hurrycane), transport w/c.  DME owned (not currently used): none.  Upon discharge, patient will have assistance from- limited family support.    Objective:     Communicated with pt's nurse prior to session.  Patient found supine with oxygen, PureWick, peripheral IV, telemetry, pulse ox (continuous)  upon PT entry to room.    General Precautions: Standard, fall   Orthopedic Precautions:N/A   Braces: N/A  Respiratory Status: Nasal cannula, flow 2 L/min    Exams:  · Cognitive Exam:  Patient is oriented to Person, Place and Situation  · Gross Motor Coordination:  WFL  · Sensation:    · -       Intact  · Skin Integrity/Edema:      · -       Skin integrity: Visible skin intact  · RLE ROM: WFL  · RLE Strength: grossly 3+/5  · LLE ROM: WFL  · LLE Strength: grossly 3+/5    Functional Mobility:  · Bed Mobility:     · Supine to Sit: contact guard assistance  · Transfers:     · Sit to Stand:  minimum assistance with rolling walker  · Bed to Chair: minimum assistance with  rolling walker  using  Stand Pivot  · Gait: ~ 5ft from bed to BSChair with RW and Maira 2* to pt with low endurance, FFT, short step length and decrease esther.  · Balance: Good sitting balance; Maira for static standing with RW.    Education:   Patient provided with daily orientation and goals of this PT session.   Patient educated to transfer to bedside chair/bedside commode/bathroom with RN/PCT present.    Patient educated on Fall risk, home safety and plan of care by explanation.    Patient Verbalized Understanding.   Time provided for therapeutic counseling and discussion of current health disposition. All questions answered to satisfaction, within scope of PT practice; voiced  no other concerns. White board updated in patient's room, RN notified of session.      AM-PAC 6 CLICK MOBILITY  Total Score:16     Patient left up in chair with all lines intact and call button in reach.    GOALS:   Multidisciplinary Problems     Physical Therapy Goals        Problem: Physical Therapy Goal    Goal Priority Disciplines Outcome Goal Variances Interventions   Physical Therapy Goal     PT, PT/OT Ongoing, Progressing     Description: Goals to be met by: 4/14/22    Patient will increase functional independence with mobility by performing:    . Supine to sit with Set-up Maury  . Sit to supine with Set-up Maury  . Sit to stand transfer with Stand-by Assistance  . Bed to chair transfer with Stand-by Assistance using Rolling Walker  . Gait  x 50 feet with Stand-by Assistance using Rolling Walker.                      History:     Past Medical History:   Diagnosis Date    CAD (coronary artery disease)     Cancer     colon    CHF (congestive heart failure)     Colitis     Colon cancer     COPD (chronic obstructive pulmonary disease)     Decreased hearing     Diabetes mellitus     Diabetes mellitus, type 2     Hypercholesterolemia     Hypertension     Hypoxemia     Insomnia     Obesity     Osteoarthritis        Past Surgical History:   Procedure Laterality Date    ANGIOGRAM, CORONARY, WITH LEFT HEART CATHETERIZATION N/A 2/10/2022    Procedure: Angiogram, Coronary, with Left Heart Cath;  Surgeon: Cristian Benjamin MD;  Location: [facility] CATH/EP LAB;  Service: Cardiology;  Laterality: N/A;    CARDIAC CATHETERIZATION      CHOLECYSTECTOMY      COLECTOMY      CORONARY ANGIOPLASTY      CORONARY STENT PLACEMENT      EXTERNAL EAR SURGERY      EYE SURGERY      FLEXIBLE SIGMOIDOSCOPY N/A 9/19/2019    Procedure: SIGMOIDOSCOPY, FLEXIBLE;  Surgeon: Biju Kramer III, MD;  Location: [facility] ENDO;  Service: Endoscopy;  Laterality: N/A;    HYSTERECTOMY      partial       Time Tracking:      PT Received On: 03/16/22  PT Start Time: 0914     PT Stop Time: 0932  PT Total Time (min): 18 min     Billable Minutes: Evaluation 9mins and Therapeutic Activity 9mins      03/16/2022

## 2022-03-16 NOTE — PLAN OF CARE
Problem: Occupational Therapy Goal  Goal: Occupational Therapy Goal  Description: Goals to be met by: 4/6/22    Patient will increase functional independence with ADLs by performing:    UE Dressing with Alton.  LE Dressing with Modified [place].  Grooming while seated with [place] and Set-up Assistance.  Toileting from toilet with Modified Al[place] for hygiene and clothing management.   Toilet transfer to toilet with Supervision.    Outcome: Ongoing, Progressing     OT evaluation complete. Pt limited by L hip pain for functional mobility to bathroom and BSC transfers. DC rec: return to SNF.

## 2022-03-16 NOTE — H&P
"[facility] - Emergency Dept  [facility] Medicine  History & Physical    Patient Name: Marisol Kerr  MRN: [id number]  Patient Class: OP- Observation  Admission Date: 3/15/2022  Attending Physician: Shelia Tracey MD   Primary Care Provider: Khadar Dwyer MD         Patient information was obtained from patient, past medical records and ER records.     Subjective:     Principal Problem:Chest pain    Chief Complaint:   Chief Complaint   Patient presents with    Chest Pain     From [facility], Substernal CP starting today, denies sob, Pt on 2L at baseline sating 100%. Per SNF, K+ 5.4, given kayexalate with multiple BMs since administration. Pt admitted to SNF for NSTEMI in February. Pt given 325 ASA and 2 SL Nitro by EMS with some minimal relief of CP        HPI: Marisol Kerr is a 76 y.o. female with CAD, CHF, HTN, HLD, T2DM, CKD, and COPD on 2 L nasal cannula who presented to the ED from [facility] for evaluation of chest pain. She was laying in bed when she noticed a substernal chest tightness that felt like a "chest cold". The pain was 10/10 but improved on its own prior to receiving NTG with EMS. She denies associated nausea or diaphoresis. She says the pain is different from her previous chest pain, but it scared her. She did have an episode of her usual chest pain a few days ago that was severe and relieved with NTG. She denies SOB, cough, abdominal pain, LE edema.     Of note, patient was admitted on 2/11/22 for NSTEMI. Interventional cardiology and CTS were both consulted. CABG was not recommended due to patient's debility. They were planning for LHC, however patient developed a rectal sheath hematoma requiring IR embolization. Patient had anemia requiring blood transfusions later in the admission. Interventional cardiology defered procedure to outpatient setting due to recent bleed. Patient was discharged to SNF on 2/22. Has appointment with Dr. Chappell for possible PCI on 3/17/2022.     ED: SA. H/H " stable. K 5.4. Cr 1.7. . Troponin 0.014 (lower than prev admission). EKG without acute ST changes.       Past Medical History:   Diagnosis Date    CAD (coronary artery disease)     Cancer     colon    CHF (congestive heart failure)     Colitis     Colon cancer     COPD (chronic obstructive pulmonary disease)     Decreased hearing     Diabetes mellitus     Diabetes mellitus, type 2     Hypercholesterolemia     Hypertension     Hypoxemia     Insomnia     Obesity     Osteoarthritis        Past Surgical History:   Procedure Laterality Date    ANGIOGRAM, CORONARY, WITH LEFT HEART CATHETERIZATION N/A 2/10/2022    Procedure: Angiogram, Coronary, with Left Heart Cath;  Surgeon: Cristian Benjamin MD;  Location: [facility] CATH/EP LAB;  Service: Cardiology;  Laterality: N/A;    CARDIAC CATHETERIZATION      CHOLECYSTECTOMY      COLECTOMY      CORONARY ANGIOPLASTY      CORONARY STENT PLACEMENT      EXTERNAL EAR SURGERY      EYE SURGERY      FLEXIBLE SIGMOIDOSCOPY N/A 9/19/2019    Procedure: SIGMOIDOSCOPY, FLEXIBLE;  Surgeon: Biju Kramer III, MD;  Location: [facility] ENDO;  Service: Endoscopy;  Laterality: N/A;    HYSTERECTOMY      partial       Review of patient's allergies indicates:   Allergen Reactions    Iodinated contrast media     Strawberries [strawberry] Hives and Itching       Current Facility-Administered Medications on File Prior to Encounter   Medication    [MAR Hold - Suspended Admission] acetaminophen tablet 650 mg    [MAR Hold - Suspended Admission] albuterol-ipratropium 2.5 mg-0.5 mg/3 mL nebulizer solution 3 mL    [MAR Hold - Suspended Admission] amLODIPine tablet 10 mg    [MAR Hold - Suspended Admission] aspirin chewable tablet 81 mg    [MAR Hold - Suspended Admission] atorvastatin tablet 80 mg    [MAR Hold - Suspended Admission] bromfenac 0.09 % ophthalmic solution 1 drop    [MAR Hold - Suspended Admission] calcium carbonate 200 mg calcium (500 mg) chewable tablet 500  mg    [MAR Hold - Suspended Admission] cholestyramine 4 gram packet 4 g    [MAR Hold - Suspended Admission] coenzyme Q10 100 mg    [MAR Hold - Suspended Admission] diclofenac sodium 1 % gel 4 g    [MAR Hold - Suspended Admission] ergocalciferol capsule 50,000 Units    [MAR Hold - Suspended Admission] fluticasone furoate-vilanteroL 100-25 mcg/dose diskus inhaler 1 puff    [MAR Hold - Suspended Admission] furosemide tablet 40 mg    [MAR Hold - Suspended Admission] gabapentin capsule 100 mg    [MAR Hold - Suspended Admission] HYDROcodone-acetaminophen 5-325 mg per tablet 1 tablet    [MAR Hold - Suspended Admission] loperamide capsule 2 mg    [MAR Hold - Suspended Admission] melatonin tablet 6 mg    [MAR Hold - Suspended Admission] metoprolol succinate (TOPROL-XL) 24 hr tablet 50 mg    [MAR Hold - Suspended Admission] miconazole NITRATE 2 % top powder    [MAR Hold - Suspended Admission] nitroGLYCERIN SL tablet 0.4 mg    [MAR Hold - Suspended Admission] omega-3 fatty acids-fish oil 340-1,000 mg capsule 1 capsule    [MAR Hold - Suspended Admission] ondansetron disintegrating tablet 8 mg    [MAR Hold - Suspended Admission] pantoprazole EC tablet 40 mg    [MAR Hold - Suspended Admission] psyllium husk (aspartame) 3.4 gram packet 1 packet    [MAR Hold - Suspended Admission] senna-docusate 8.6-50 mg per tablet 1 tablet    [MAR Hold - Suspended Admission] simethicone chewable tablet 80 mg    [COMPLETED] sodium bicarbonate tablet 650 mg    [] sodium polystyrene 15 gram/60 mL suspension 30 g    [MAR Hold - Suspended Admission] ticagrelor tablet 90 mg    [MAR Hold - Suspended Admission] tiotropium bromide 2.5 mcg/actuation inhaler 2 puff    [MAR Hold - Suspended Admission] triamcinolone acetonide 0.1% cream    [DISCONTINUED] lisinopriL tablet 10 mg     Current Outpatient Medications on File Prior to Encounter   Medication Sig    amLODIPine (NORVASC) 10 MG tablet Take 1 tablet (10 mg total) by  mouth once daily.    atorvastatin (LIPITOR) 80 MG tablet Take 1 tablet (80 mg total) by mouth every evening.    furosemide (LASIX) 20 MG tablet Take 2 tablets (40 mg total) by mouth once daily.    lisinopriL 10 MG tablet Take 1 tablet (10 mg total) by mouth once daily.    metoprolol succinate (TOPROL-XL) 50 MG 24 hr tablet Take 1 tablet (50 mg total) by mouth once daily.    pantoprazole (PROTONIX) 40 MG tablet Take 1 tablet (40 mg total) by mouth once daily.    acetaminophen (TYLENOL) 325 MG tablet Take 2 tablets (650 mg total) by mouth every 4 (four) hours as needed.    albuterol (VENTOLIN HFA) 90 mcg/actuation inhaler Inhale 2 puffs into the lungs every 6 (six) hours as needed for Wheezing or Shortness of Breath. Rescue    aspirin (ECOTRIN) 81 MG EC tablet Take 81 mg by mouth every morning.     bromfenac (PROLENSA) 0.07 % Drop Place 1 drop into both eyes daily as needed.    coenzyme Q10 100 mg capsule Take 100 mg by mouth every morning.    dextran 70-hypromellose 0.1-0.3 % Drop Place 1 drop into both eyes daily as needed.    ergocalciferol (VITAMIN D2) 50,000 unit Cap Take 1 capsule (50,000 Units total) by mouth every 7 days.    fish oil-omega-3 fatty acids 300-1,000 mg capsule Take 1 capsule by mouth every morning.    FLAXSEED OIL ORAL Take 1,200 mg by mouth every morning.    fluticasone-salmeterol diskus inhaler 250-50 mcg Inhale 1 puff into the lungs 2 (two) times daily.    ipratropium-albuteroL (COMBIVENT RESPIMAT)  mcg/actuation inhaler Inhale 2 puffs into the lungs every 4 (four) hours as needed for Shortness of Breath. Rescue    NARCAN 4 mg/actuation Spry     nitroGLYCERIN 0.2 mg/hr TD PT24 (NITRODUR) 0.2 mg/hr APPLY 1 PATCH TOPICALLY ONCE DAILY TO LEG.    nitroGLYCERIN 0.4 MG/DOSE TL SPRY (NITROLINGUAL) 400 mcg/spray spray USE ONE SPRAY UNDER THE TONGUE EVERY 5 MINUTES AS NEEDED FOR CHEST PAIN.  DO NOT EXCEED A TOTAL OF 3 SPRAYS IN 15 MINUTES    ondansetron (ZOFRAN-ODT) 4 MG TbDL  Take 2 tablets (8 mg total) by mouth every 6 (six) hours as needed.    PNV NO.115/IRON FUMARATE/FA (PRENATAL #115-IRON-FOLIC ACID ORAL) Take 1 tablet by mouth every morning.     temazepam (RESTORIL) 15 mg Cap Take 1 capsule (15 mg total) by mouth every evening.    ticagrelor (BRILINTA) 90 mg tablet Take 1 tablet (90 mg total) by mouth 2 (two) times a day.    triamcinolone acetonide 0.1% (KENALOG) 0.1 % cream Apply topically 2 (two) times daily. For legs.    umeclidinium (INCRUSE ELLIPTA) 62.5 mcg/actuation inhalation capsule Inhale 1 capsule (63 mcg total) into the lungs every morning. Controller    vit C/E/Zn/coppr/lutein/zeaxan (PRESERVISION AREDS-2 ORAL) Take 1 capsule by mouth every morning.    [DISCONTINUED] benazepriL (LOTENSIN) 40 MG tablet Take 1 tablet (40 mg total) by mouth once daily.    [DISCONTINUED] lovastatin (MEVACOR) 40 MG tablet Take 0.5 tablets (20 mg total) by mouth every evening. (Patient taking differently: Take 40 mg by mouth every other day.)     Family History       Problem Relation (Age of Onset)    Febrile seizures Mother    Heart failure Father          Tobacco Use    Smoking status: Former Smoker     Packs/day: 3.00     Years: 50.00     Pack years: 150.00     Types: Cigarettes     Start date: 3/30/1964     Quit date: 2006     Years since quittin.0    Smokeless tobacco: Never Used    Tobacco comment: ex smoker 15 years   Substance and Sexual Activity    Alcohol use: Yes    Drug use: No    Sexual activity: Never     Review of Systems   Constitutional:  Negative for chills and fever.   HENT:  Negative for congestion and rhinorrhea.    Eyes:  Negative for photophobia and visual disturbance.   Respiratory:  Negative for cough and shortness of breath.    Cardiovascular:  Positive for chest pain. Negative for leg swelling.   Gastrointestinal:  Negative for abdominal pain and nausea.   Genitourinary:  Negative for dysuria, frequency and urgency.   Musculoskeletal:   Positive for arthralgias (chronic). Negative for myalgias.   Skin:  Negative for rash.   Neurological:  Negative for dizziness and weakness.   Objective:     Vital Signs (Most Recent):  Temp: 98 °F (36.7 °C) (03/15/22 2245)  Pulse: 69 (03/16/22 0303)  Resp: 18 (03/15/22 2245)  BP: 132/60 (03/16/22 0303)  SpO2: 99 % (03/16/22 0303) Vital Signs (24h Range):  Temp:  [97.8 °F (36.6 °C)-98.1 °F (36.7 °C)] 98 °F (36.7 °C)  Pulse:  [55-69] 69  Resp:  [14-20] 18  SpO2:  [98 %-100 %] 99 %  BP: (108-157)/(40-70) 132/60        There is no height or weight on file to calculate BMI.    Physical Exam  Vitals and nursing note reviewed.   Constitutional:       General: She is not in acute distress.     Appearance: She is well-developed.   HENT:      Head: Normocephalic and atraumatic.      Mouth/Throat:      Mouth: Mucous membranes are moist.   Eyes:      Conjunctiva/sclera: Conjunctivae normal.      Pupils: Pupils are equal, round, and reactive to light.   Cardiovascular:      Rate and Rhythm: Normal rate and regular rhythm.      Heart sounds: Normal heart sounds.   Pulmonary:      Effort: No respiratory distress.      Breath sounds: Normal breath sounds. No wheezing.      Comments: On 2L NC  Abdominal:      General: Bowel sounds are normal. There is no distension.      Palpations: Abdomen is soft.      Tenderness: There is no abdominal tenderness.   Musculoskeletal:         General: No tenderness. Normal range of motion.      Cervical back: Normal range of motion and neck supple.      Right lower leg: No edema.      Left lower leg: No edema.   Lymphadenopathy:      Cervical: No cervical adenopathy.   Skin:     General: Skin is warm and dry.      Capillary Refill: Capillary refill takes less than 2 seconds.      Comments: Chronic skin changes to bilateral LE   Neurological:      General: No focal deficit present.      Mental Status: She is alert and oriented to person, place, and time.      Cranial Nerves: No cranial nerve  deficit.   Psychiatric:         Behavior: Behavior normal.         Thought Content: Thought content normal.         Judgment: Judgment normal.         CRANIAL NERVES     CN III, IV, VI   Pupils are equal, round, and reactive to light.     Significant Labs: All pertinent labs within the past 24 hours have been reviewed.  CBC:   Recent Labs   Lab 03/15/22  0415 03/16/22  0025   WBC 5.99 6.39   HGB 8.5* 8.3*   HCT 28.8* 27.4*   * 123*     CMP:   Recent Labs   Lab 03/15/22  0415 03/16/22  0025    140   K 5.4* 5.3*    110   CO2 21* 20*   GLU 90 106   BUN 72* 74*   CREATININE 1.7* 1.8*   CALCIUM 9.1 8.8   PROT  --  5.6*   ALBUMIN  --  2.9*   BILITOT  --  0.4   ALKPHOS  --  95   AST  --  14   ALT  --  13   ANIONGAP 8 10   EGFRNONAA 29.3* 27.3*     Cardiac Markers:   Recent Labs   Lab 03/16/22 0025   *     Troponin:   Recent Labs   Lab 03/16/22 0025   TROPONINI 0.014       Significant Imaging: I have reviewed all pertinent imaging results/findings within the past 24 hours.    Assessment/Plan:     * Chest pain  - EKG without ST changes, troponin 0.014, will trend  - Recent hx of increasing episodes of angina requiring more frequent Nitroglycerin use and underwent LHC at OSH.   -Recent Left heart cath[02/11/22] showed:  · The RPAV lesion was 100% stenosed.  · The RPDA lesion was 100% stenosed.  · The Prox Cx to Mid Cx lesion was 80% stenosed.  · The 1st LPL lesion was 90% stenosed.  · The Prox RCA-2 lesion was 70% stenosed.  · The Prox RCA-1 lesion was 90% stenosed.  · The Mid LAD-2 lesion was 60% stenosed.  - pt scheduled with cards on 3/17 for follow up appointment, will consult  - continue 81 mg ASA qd, Atorvastatin 80mg qhs, Metoprolol 50 mg XL, Ticagrelor 90mg bid  - EKG prn chest pain  - NTG prn chest pain    Hyperkalemia  - K 5.4, given lokelma at SNF and had multiple bowel movements  - no EKG changes  - monitor with daily labs    Chronic diastolic congestive heart failure  - TTE notable for  Grade II left ventricular diastolic dysfunction, EF 65%  - continue Lasix 40 mg BID, lisinopril 10 mg daily  - Cardiac diet with Fluid restriction at 1.5L with strict I/Os and daily weights    Chronic hypoxemic respiratory failure  Emphysema    - Known history of severe COPD (GOLD IV D PFT 2020). On home oxygen (2-3L)  - Home medications: albuterol (VENTOLIN HFA) 90 mcg/actuation inhaler, ipratropium-albuteroL (COMBIVENT RESPIMAT)  mcg/actuation inhaler,  umeclidinium (INCRUSE ELLIPTA) 62.5 mcg/actuation inhalation capsule, fluticasone-salmeterol diskus inhaler 250-50 mcg  - continue Breo and Spiriva inhaler   - Continue DuoNebs PRN    CKD (chronic kidney disease), stage III  - Baseline sCr 1.5- 1.8   - avoid nephrotoxic agents  - renally dose medications when appropriate    Gastroesophageal reflux disease without esophagitis  - continue PPI    Coronary artery disease, multivessel with history of previous PCI  - continue ASA, statin, brilinta     DM type 2, controlled, with complication  - last A1c 5.2 on 01/27/2022  - diabetic diet, Accu-Cheks AC/HS  - continue LDSSI PRN     Essential hypertension, benign  - home regimen with amlodipine 5 mg daily, benazepril 40 mg daily  - continue amlodipine 10 mg daily, lisinopril 10 mg daily, metoprolol XL 50 mg daily    Hypercholesterolemia  - continue statin      VTE Risk Mitigation (From admission, onward)         Ordered     IP VTE HIGH RISK PATIENT  Once         03/16/22 0202     Place sequential compression device  Until discontinued         03/16/22 0202                   Darlene Gurrola PA-C  Department of Hospital Medicine   [facility] - Emergency Dept

## 2022-03-16 NOTE — PLAN OF CARE
POC reviewed. Pt to be NPO at midnight for procedure in the morning. Pt up to chair for the majority of the day. Purewick changed. Pt vss, nad. Call light within reach.

## 2022-03-16 NOTE — PT/OT/SLP EVAL
"Occupational Therapy   Evaluation    Name: Marisol Kerr  MRN: [id number]  Admitting Diagnosis:  Chest pain  Recent Surgery: * No surgery found *      Recommendations:     Discharge Recommendations: nursing facility, skilled  Discharge Equipment Recommendations:  none  Barriers to discharge:  Decreased caregiver support, Other (Comment) (below baseline independence, fall risk)    Assessment:     Marisol Kerr is a 76 y.o. female with a medical diagnosis of Chest pain.  She presents with weakness, impaired endurance, impaired self care skills, impaired functional mobilty, impaired balance, decreased coordination, decreased lower extremity function, pain, impaired coordination.      Rehab Prognosis: Good; patient would benefit from acute skilled OT services to address these deficits and reach maximum level of function.       Plan:     Patient to be seen 3 x/week to address the above listed problems via self-care/home management, therapeutic activities, therapeutic exercises  · Plan of Care Expires: 04/15/22  · Plan of Care Reviewed with: patient    Subjective     Chief Complaint: pain in L hip  Patient/Family Comments/goals: to return to SNF, then home    Occupational Profile:  Living Environment: Pt presents from [facility]. Pt lives alone, [facility], [address]. Walk in shower with grab bars and approx 6" threshold to entrance.   Previous level of function: Mod I with cane  Roles and Routines: Enjoys taking care of her home and performs light cleaning. Her daughter comes by 1x/week to assist with cleaning. Daughter completes grocery shopping for patient.   Equipment Used at Home:  cane, quad, cane, straight, shower chair, hip kit, raised toilet (rollator, transport chair, raised toilet seat with grab bars attached)  Assistance upon Discharge: none    Pain/Comfort:  · Pain Rating 1:  ("15/10")  · Location - Side 1: Left  · Location 1: hip  · Pain Addressed 1: Reposition, Distraction, Cessation of Activity    Patients cultural, " "spiritual, Shinto conflicts given the current situation: no    Objective:     Communicated with: RN prior to session.  Patient found up in chair with oxygen, daniel catheter, peripheral IV upon OT entry to room.    General Precautions: Standard, fall   Orthopedic Precautions:N/A   Braces: N/A  Respiratory Status: Nasal cannula, flow 2 L/min    Occupational Performance:    Bed Mobility:    · Not tested; pt received and left sitting in chair    Functional Mobility/Transfers:  · Sit <> stand: CGA with RW. Pt maintained for 30 seconds before returning to sitting 2/2 L hip pain.   · Pt declined functional ambulation 2/2 L hip pain. Pt stated "I have good days and I have bad days. Today is a bad day ladies"  · Functional Mobility: unable to progress. BSC brought to patient's room to progress transfers and OOB activity.     Activities of Daily Living:  · Lower Body Dressing: maximal assistance to don socks  · Grooming: pt declined grooming this date, supplies provided on bedside table     Cognitive/Visual Perceptual:  Cognitive/Psychosocial Skills:     -       Oriented to: not formally assessed, no deficits noted   -       Follows Commands/attention:Follows two-step commands  -       Communication: clear/fluent  -       Safety awareness/insight to disability: intact   -       Mood/Affect/Coping skills/emotional control: Appropriate to situation  Visual/Perceptual:      -Intact  -glasses at all times     Physical Exam:  Upper Extremity Range of Motion:     -       Right Upper Extremity: WFL  -       Left Upper Extremity: WFL  Upper Extremity Strength:    -       Right Upper Extremity: WFL  -       Left Upper Extremity: WFL   R hand dominant     AMPAC 6 Click ADL:  AMPAC Total Score: 15    Treatment & Education:  OT role, plan of care, progression of goals, discharge recommendation, fall prevention, adaptive equipment, importance of continued OOB activity   Education:    Patient left up in chair with all lines intact, call " button in reach and RN notified    GOALS:   Multidisciplinary Problems     Occupational Therapy Goals        Problem: Occupational Therapy Goal    Goal Priority Disciplines Outcome Interventions   Occupational Therapy Goal     OT, PT/OT Ongoing, Progressing    Description: Goals to be met by: 4/6/22    Patient will increase functional independence with ADLs by performing:    UE Dressing with [place].  LE Dressing with Modified [place].  Grooming while seated with [place] and Set-up Assistance.  Toileting from toilet with Modified [place] for hygiene and clothing management.   Toilet transfer to toilet with Supervision.                     History:     Past Medical History:   Diagnosis Date    CAD (coronary artery disease)     Cancer     colon    CHF (congestive heart failure)     Colitis     Colon cancer     COPD (chronic obstructive pulmonary disease)     Decreased hearing     Diabetes mellitus     Diabetes mellitus, type 2     Hypercholesterolemia     Hypertension     Hypoxemia     Insomnia     Obesity     Osteoarthritis        Past Surgical History:   Procedure Laterality Date    ANGIOGRAM, CORONARY, WITH LEFT HEART CATHETERIZATION N/A 2/10/2022    Procedure: Angiogram, Coronary, with Left Heart Cath;  Surgeon: Cristian Benjamin MD;  Location: [facility] CATH/EP LAB;  Service: Cardiology;  Laterality: N/A;    CARDIAC CATHETERIZATION      CHOLECYSTECTOMY      COLECTOMY      CORONARY ANGIOPLASTY      CORONARY STENT PLACEMENT      EXTERNAL EAR SURGERY      EYE SURGERY      FLEXIBLE SIGMOIDOSCOPY N/A 9/19/2019    Procedure: SIGMOIDOSCOPY, FLEXIBLE;  Surgeon: Biju Kramer III, MD;  Location: [facility] ENDO;  Service: Endoscopy;  Laterality: N/A;    HYSTERECTOMY      partial       Time Tracking:     OT Date of Treatment: 03/16/22  OT Start Time: 0959  OT Stop Time: 1021  OT Total Time (min): 22 min    Billable Minutes:Evaluation 12 minutes  Therapeutic Activity 10  minutes    3/16/2022

## 2022-03-16 NOTE — ASSESSMENT & PLAN NOTE
- TTE notable for Grade II left ventricular diastolic dysfunction, EF 65%  - continue Lasix 40 mg BID, lisinopril 10 mg daily  - Cardiac diet with Fluid restriction at 1.5L with strict I/Os and daily weights

## 2022-03-16 NOTE — ED PROVIDER NOTES
Encounter Date: 3/15/2022       History     Chief Complaint   Patient presents with    Chest Pain     From [facility], Substernal CP starting today, denies sob, Pt on 2L at baseline sating 100%. Per SNF, K+ 5.4, given kayexalate with multiple BMs since administration. Pt admitted to SNF for NSTEMI in February. Pt given 325 ASA and 2 SL Nitro by EMS with some minimal relief of CP     76-year-old with history of CAD, CHF, COPD on 2 L nasal cannula at home who comes in substernal chest pain.  Patient was laying in bed when she noticed chest tightness without radiation associated with nausea no diaphoresis.  Pain was 10 of 10 initially but improved with nitroglycerin sprays.  Her chest tightness is currently minimal and she is not believe she requires any additional nitroglycerin at this time.  Patient states this episode feels different than when she had an NSTEMI.        Review of patient's allergies indicates:   Allergen Reactions    Iodinated contrast media     Strawberries [strawberry] Hives and Itching     Past Medical History:   Diagnosis Date    CAD (coronary artery disease)     Cancer     colon    CHF (congestive heart failure)     Colitis     Colon cancer     COPD (chronic obstructive pulmonary disease)     Decreased hearing     Diabetes mellitus     Diabetes mellitus, type 2     Hypercholesterolemia     Hypertension     Hypoxemia     Insomnia     Obesity     Osteoarthritis      Past Surgical History:   Procedure Laterality Date    ANGIOGRAM, CORONARY, WITH LEFT HEART CATHETERIZATION N/A 2/10/2022    Procedure: Angiogram, Coronary, with Left Heart Cath;  Surgeon: Cristian Benjamin MD;  Location: [facility] CATH/EP LAB;  Service: Cardiology;  Laterality: N/A;    CARDIAC CATHETERIZATION      CHOLECYSTECTOMY      COLECTOMY      CORONARY ANGIOPLASTY      CORONARY STENT PLACEMENT      EXTERNAL EAR SURGERY      EYE SURGERY      FLEXIBLE SIGMOIDOSCOPY N/A 9/19/2019    Procedure:  SIGMOIDOSCOPY, FLEXIBLE;  Surgeon: Biju Kramer III, MD;  Location: [facility];  Service: Endoscopy;  Laterality: N/A;    HYSTERECTOMY      partial     Family History   Problem Relation Age of Onset    Febrile seizures Mother     Heart failure Father      Social History     Tobacco Use    Smoking status: Former Smoker     Packs/day: 3.00     Years: 50.00     Pack years: 150.00     Types: Cigarettes     Start date: 3/30/1964     Quit date: 2006     Years since quittin.0    Smokeless tobacco: Never Used    Tobacco comment: ex smoker 15 years   Substance Use Topics    Alcohol use: Yes    Drug use: No     Review of Systems   Constitutional: Negative for activity change and appetite change.   HENT: Negative for congestion and drooling.    Eyes: Negative for pain and discharge.   Respiratory: Negative for chest tightness and shortness of breath.    Cardiovascular: Positive for chest pain. Negative for palpitations and leg swelling.   Gastrointestinal: Positive for nausea. Negative for abdominal distention and abdominal pain.   Endocrine: Negative for cold intolerance and heat intolerance.   Skin: Negative for color change and rash.   Neurological: Negative for dizziness and headaches.   Psychiatric/Behavioral: Negative for agitation and behavioral problems.       Physical Exam     Initial Vitals [03/15/22 2245]   BP Pulse Resp Temp SpO2   (!) 110/40 66 18 98 °F (36.7 °C) 99 %      MAP       --         Physical Exam    Nursing note and vitals reviewed.  Constitutional: She appears well-developed and well-nourished.   HENT:   Head: Normocephalic and atraumatic.   Eyes: Right eye exhibits no discharge. Left eye exhibits no discharge.   Neck: Neck supple.   Normal range of motion.  Cardiovascular: Normal rate and regular rhythm.   Pulmonary/Chest: Breath sounds normal. No respiratory distress.   Abdominal: She exhibits no distension. There is no abdominal tenderness.   Musculoskeletal:      Cervical  back: Normal range of motion and neck supple.     Neurological: She is alert and oriented to person, place, and time.   Skin: Skin is warm and dry. Capillary refill takes less than 2 seconds.   Psychiatric: She has a normal mood and affect. Thought content normal.         ED Course   Procedures  Labs Reviewed   CBC W/ AUTO DIFFERENTIAL - Abnormal; Notable for the following components:       Result Value    RBC 2.86 (*)     Hemoglobin 8.3 (*)     Hematocrit 27.4 (*)     MCHC 30.3 (*)     RDW 14.8 (*)     Platelets 123 (*)     Lymph % 16.4 (*)     All other components within normal limits   COMPREHENSIVE METABOLIC PANEL - Abnormal; Notable for the following components:    Potassium 5.3 (*)     CO2 20 (*)     BUN 74 (*)     Creatinine 1.8 (*)     Total Protein 5.6 (*)     Albumin 2.9 (*)     eGFR if  31.5 (*)     eGFR if non  27.3 (*)     All other components within normal limits   B-TYPE NATRIURETIC PEPTIDE - Abnormal; Notable for the following components:     (*)     All other components within normal limits   TROPONIN I   TROPONIN I   SARS-COV-2 RDRP GENE    Narrative:     This test utilizes isothermal nucleic acid amplification   technology to detect the SARS-CoV-2 RdRp nucleic acid segment.   The analytical sensitivity (limit of detection) is 125 genome   equivalents/mL.   A POSITIVE result implies infection with the SARS-CoV-2 virus;   the patient is presumed to be contagious.     A NEGATIVE result means that SARS-CoV-2 nucleic acids are not   present above the limit of detection. A NEGATIVE result should be   treated as presumptive. It does not rule out the possibility of   COVID-19 and should not be the sole basis for treatment decisions.   If COVID-19 is strongly suspected based on clinical and exposure   history, re-testing using an alternate molecular assay should be   considered.   This test is only for use under the Food and Drug   Administration s Emergency Use  Authorization (EUA).   Commercial kits are provided by [employer].   Performance characteristics of the EUA have been independently   verified by [facility] Department of   Pathology and Laboratory Medicine.   _________________________________________________________________   The authorized Fact Sheet for Healthcare Providers and the authorized Fact   Sheet for Patients of the ID NOW COVID-19 are available on the FDA   website:     https://www.fda.gov/media/521501/download  https://www.fda.gov/media/763637/download                  Imaging Results          X-Ray Chest AP Portable (Final result)  Result time 03/16/22 00:07:13    Final result by Yanna Shin MD (03/16/22 00:07:13)                 Impression:      Infrahilar opacities more so on right were also seen on prior exams suggesting underlying chronic interstitial changes in this patient with COPD.  Correlate for the possibility of superimposed mild pneumonitis on the right.      Electronically signed by: Yanna Shin  Date:    03/16/2022  Time:    00:07             Narrative:    EXAMINATION:  XR CHEST AP PORTABLE    CLINICAL HISTORY:  Chest Pain;    TECHNIQUE:  Single frontal view of the chest was performed.    COMPARISON:  02/10/2022, 09/16/2019, 05/10/2019, 07/15/2018, 11/14/2013 chest x-ray    FINDINGS:  The cardiomediastinal silhouette is stable and not significantly enlarged.  There are mild reticular opacities again seen bilaterally in the infrahilar region similar to the prior exam suggesting chronic interstitial changes.  There is no acute lobar consolidation.  There is increased lucency specially over the upper lungs as seen with COPD.  There is no evidence of pleural effusion or pneumothorax.  Osseous structures grossly appear stable with degenerative changes of the shoulders noted.                                 Medications   aspirin tablet 325 mg (325 mg Oral Not Given 3/15/22 3750)           APC / Resident Notes:    76-year-old with history of CAD, CHF, COPD on 2 L nasal cannula at home who comes in substernal chest pain.  Patient was laying in bed when she noticed chest tightness without radiation associated with nausea no diaphoresis.  Pain was 10 of 10 initially but improved with nitroglycerin sprays.  Her chest tightness is currently minimal and she is not believe she requires any additional nitroglycerin at this time.  Patient states this episode feels different than when she had an NSTEMI.  On exam she is hemodynamically stable and afebrile.  Concern for STEMI versus NSTEMI versus other ACS spectrum disease versus other emergent cause of chest pain.  Will obtain a full cardiopulmonary evaluation. Pt has a HEART score of 6.     [unfilled]   ED Course as of 03/16/22 0147   Wed Mar 16, 2022   0145 CBC auto differential(!)  At her baseline [RK]   0145 Troponin I #1  downtrended [RK]   0145 B-Type natriuretic peptide (BNP)(!)  downtrended [RK]   0145 Comprehensive metabolic panel(!)  ISSA from discharge, mild hyperK, no EKG changes [RK]   0145 POCT COVID-19 Rapid Screening  negative [RK]   0146 EKG 12-lead  Sinus bradycardia rate of 51 beats per minute.  Normal axis.  When compared to previous there are some nonspecific ST changes.  No evidence of acute ischemia or infarction.  No evidence of acute right heart strain. [RK]   0146 Pt to be admitted to hospitalist service for high risk CP [RK]      ED Course User Index  [RK] Franki Ma MD             Clinical Impression:   Final diagnoses:  [R07.9] Chest pain  [N17.9] ISSA (acute kidney injury) (Primary)          ED Disposition Condition    Observation               Franki Ma MD  Resident  03/16/22 0147

## 2022-03-16 NOTE — ASSESSMENT & PLAN NOTE
NSTEMI  Unstable Angina    76 yoF with known severe multivessel disease, HTN, HFpEF presenting with chest pain. Patient had plans for outpatient PCI intervention with Dr. Chappell on 3/17. Patient has known lesiosn in the RPAV, RPDA, Circumflex, LPL, RCA, and LAD. ANUSHKA of 6 and Genet of 136. No angiogram imaging available at [facility]. Likely angina from known vessel disease with future plans for intervention.     Plan:  - IC consulted and appreciate recommendations  - will need disk of angiogram sent from [place]  - Continuous Telemetry monitoring  - continue 81 mg ASA qd  - Atorvastatin 80mg qhs  - Metoprolol 50 mg XL  - continue Ticagrelor 90mg bid  - continue imdur 30 mg qd

## 2022-03-16 NOTE — PROVIDER PROGRESS NOTES - EMERGENCY DEPT.
Encounter Date: 3/15/2022    ED Physician Progress Notes         EKG - STEMI Decision  Initial Reading: No STEMI present.

## 2022-03-16 NOTE — ASSESSMENT & PLAN NOTE
- EKG without ST changes, troponin 0.014, will trend  - Recent hx of increasing episodes of angina requiring more frequent Nitroglycerin use and underwent LHC at OSH.   -Recent Left heart cath[02/11/22] showed:  · The RPAV lesion was 100% stenosed.  · The RPDA lesion was 100% stenosed.  · The Prox Cx to Mid Cx lesion was 80% stenosed.  · The 1st LPL lesion was 90% stenosed.  · The Prox RCA-2 lesion was 70% stenosed.  · The Prox RCA-1 lesion was 90% stenosed.  · The Mid LAD-2 lesion was 60% stenosed.  - pt scheduled with cards on 3/17 for follow up appointment, will consult  - continue 81 mg ASA qd, Atorvastatin 80mg qhs, Metoprolol 50 mg XL, Ticagrelor 90mg bid  - EKG prn chest pain  - NTG prn chest pain

## 2022-03-16 NOTE — ASSESSMENT & PLAN NOTE
- home regimen with amlodipine 5 mg daily, benazepril 40 mg daily  - continue amlodipine 10 mg daily, lisinopril 10 mg daily, metoprolol XL 50 mg daily

## 2022-03-16 NOTE — PLAN OF CARE
Problem: Physical Therapy Goal  Goal: Physical Therapy Goal  Description: Goals to be met by: 4/14/22    Patient will increase functional independence with mobility by performing:    . Supine to sit with Set-up [place]  . Sit to supine with Set-up [place]  . Sit to stand transfer with Stand-by Assistance  . Bed to chair transfer with Stand-by Assistance using Rolling Walker  . Gait  x 50 feet with Stand-by Assistance using Rolling Walker.     Outcome: Ongoing, Progressing

## 2022-03-16 NOTE — SUBJECTIVE & OBJECTIVE
Past Medical History:   Diagnosis Date    CAD (coronary artery disease)     Cancer     colon    CHF (congestive heart failure)     Colitis     Colon cancer     COPD (chronic obstructive pulmonary disease)     Decreased hearing     Diabetes mellitus     Diabetes mellitus, type 2     Hypercholesterolemia     Hypertension     Hypoxemia     Insomnia     Obesity     Osteoarthritis        Past Surgical History:   Procedure Laterality Date    ANGIOGRAM, CORONARY, WITH LEFT HEART CATHETERIZATION N/A 2/10/2022    Procedure: Angiogram, Coronary, with Left Heart Cath;  Surgeon: Cristian Benjamin MD;  Location: [facility] CATH/EP LAB;  Service: Cardiology;  Laterality: N/A;    CARDIAC CATHETERIZATION      CHOLECYSTECTOMY      COLECTOMY      CORONARY ANGIOPLASTY      CORONARY STENT PLACEMENT      EXTERNAL EAR SURGERY      EYE SURGERY      FLEXIBLE SIGMOIDOSCOPY N/A 9/19/2019    Procedure: SIGMOIDOSCOPY, FLEXIBLE;  Surgeon: Biju Kramer III, MD;  Location: [facility] ENDO;  Service: Endoscopy;  Laterality: N/A;    HYSTERECTOMY      partial       Review of patient's allergies indicates:   Allergen Reactions    Iodinated contrast media     Strawberries [strawberry] Hives and Itching       Current Facility-Administered Medications on File Prior to Encounter   Medication    [MAR Hold - Suspended Admission] acetaminophen tablet 650 mg    [MAR Hold - Suspended Admission] albuterol-ipratropium 2.5 mg-0.5 mg/3 mL nebulizer solution 3 mL    [MAR Hold - Suspended Admission] amLODIPine tablet 10 mg    [MAR Hold - Suspended Admission] aspirin chewable tablet 81 mg    [MAR Hold - Suspended Admission] atorvastatin tablet 80 mg    [MAR Hold - Suspended Admission] bromfenac 0.09 % ophthalmic solution 1 drop    [MAR Hold - Suspended Admission] calcium carbonate 200 mg calcium (500 mg) chewable tablet 500 mg    [MAR Hold - Suspended Admission] cholestyramine 4 gram packet 4 g    [MAR Hold - Suspended Admission] coenzyme Q10 100 mg    [MAR Hold -  Suspended Admission] diclofenac sodium 1 % gel 4 g    [MAR Hold - Suspended Admission] ergocalciferol capsule 50,000 Units    [MAR Hold - Suspended Admission] fluticasone furoate-vilanteroL 100-25 mcg/dose diskus inhaler 1 puff    [MAR Hold - Suspended Admission] furosemide tablet 40 mg    [MAR Hold - Suspended Admission] gabapentin capsule 100 mg    [MAR Hold - Suspended Admission] HYDROcodone-acetaminophen 5-325 mg per tablet 1 tablet    [MAR Hold - Suspended Admission] loperamide capsule 2 mg    [MAR Hold - Suspended Admission] melatonin tablet 6 mg    [MAR Hold - Suspended Admission] metoprolol succinate (TOPROL-XL) 24 hr tablet 50 mg    [MAR Hold - Suspended Admission] miconazole NITRATE 2 % top powder    [MAR Hold - Suspended Admission] nitroGLYCERIN SL tablet 0.4 mg    [MAR Hold - Suspended Admission] omega-3 fatty acids-fish oil 340-1,000 mg capsule 1 capsule    [MAR Hold - Suspended Admission] ondansetron disintegrating tablet 8 mg    [MAR Hold - Suspended Admission] pantoprazole EC tablet 40 mg    [MAR Hold - Suspended Admission] psyllium husk (aspartame) 3.4 gram packet 1 packet    [MAR Hold - Suspended Admission] senna-docusate 8.6-50 mg per tablet 1 tablet    [MAR Hold - Suspended Admission] simethicone chewable tablet 80 mg    [COMPLETED] sodium bicarbonate tablet 650 mg    [] sodium polystyrene 15 gram/60 mL suspension 30 g    [MAR Hold - Suspended Admission] ticagrelor tablet 90 mg    [MAR Hold - Suspended Admission] tiotropium bromide 2.5 mcg/actuation inhaler 2 puff    [MAR Hold - Suspended Admission] triamcinolone acetonide 0.1% cream    [DISCONTINUED] lisinopriL tablet 10 mg     Current Outpatient Medications on File Prior to Encounter   Medication Sig    amLODIPine (NORVASC) 10 MG tablet Take 1 tablet (10 mg total) by mouth once daily.    atorvastatin (LIPITOR) 80 MG tablet Take 1 tablet (80 mg total) by mouth every evening.    furosemide (LASIX) 20 MG tablet Take 2 tablets (40 mg total)  by mouth once daily.    lisinopriL 10 MG tablet Take 1 tablet (10 mg total) by mouth once daily.    metoprolol succinate (TOPROL-XL) 50 MG 24 hr tablet Take 1 tablet (50 mg total) by mouth once daily.    pantoprazole (PROTONIX) 40 MG tablet Take 1 tablet (40 mg total) by mouth once daily.    acetaminophen (TYLENOL) 325 MG tablet Take 2 tablets (650 mg total) by mouth every 4 (four) hours as needed.    albuterol (VENTOLIN HFA) 90 mcg/actuation inhaler Inhale 2 puffs into the lungs every 6 (six) hours as needed for Wheezing or Shortness of Breath. Rescue    aspirin (ECOTRIN) 81 MG EC tablet Take 81 mg by mouth every morning.     bromfenac (PROLENSA) 0.07 % Drop Place 1 drop into both eyes daily as needed.    coenzyme Q10 100 mg capsule Take 100 mg by mouth every morning.    dextran 70-hypromellose 0.1-0.3 % Drop Place 1 drop into both eyes daily as needed.    ergocalciferol (VITAMIN D2) 50,000 unit Cap Take 1 capsule (50,000 Units total) by mouth every 7 days.    fish oil-omega-3 fatty acids 300-1,000 mg capsule Take 1 capsule by mouth every morning.    FLAXSEED OIL ORAL Take 1,200 mg by mouth every morning.    fluticasone-salmeterol diskus inhaler 250-50 mcg Inhale 1 puff into the lungs 2 (two) times daily.    ipratropium-albuteroL (COMBIVENT RESPIMAT)  mcg/actuation inhaler Inhale 2 puffs into the lungs every 4 (four) hours as needed for Shortness of Breath. Rescue    NARCAN 4 mg/actuation Spry     nitroGLYCERIN 0.2 mg/hr TD PT24 (NITRODUR) 0.2 mg/hr APPLY 1 PATCH TOPICALLY ONCE DAILY TO LEG.    nitroGLYCERIN 0.4 MG/DOSE TL SPRY (NITROLINGUAL) 400 mcg/spray spray USE ONE SPRAY UNDER THE TONGUE EVERY 5 MINUTES AS NEEDED FOR CHEST PAIN.  DO NOT EXCEED A TOTAL OF 3 SPRAYS IN 15 MINUTES    ondansetron (ZOFRAN-ODT) 4 MG TbDL Take 2 tablets (8 mg total) by mouth every 6 (six) hours as needed.    PNV NO.115/IRON FUMARATE/FA (PRENATAL #115-IRON-FOLIC ACID ORAL) Take 1 tablet by mouth every morning.     temazepam  (RESTORIL) 15 mg Cap Take 1 capsule (15 mg total) by mouth every evening.    ticagrelor (BRILINTA) 90 mg tablet Take 1 tablet (90 mg total) by mouth 2 (two) times a day.    triamcinolone acetonide 0.1% (KENALOG) 0.1 % cream Apply topically 2 (two) times daily. For legs.    umeclidinium (INCRUSE ELLIPTA) 62.5 mcg/actuation inhalation capsule Inhale 1 capsule (63 mcg total) into the lungs every morning. Controller    vit C/E/Zn/coppr/lutein/zeaxan (PRESERVISION AREDS-2 ORAL) Take 1 capsule by mouth every morning.    [DISCONTINUED] benazepriL (LOTENSIN) 40 MG tablet Take 1 tablet (40 mg total) by mouth once daily.    [DISCONTINUED] lovastatin (MEVACOR) 40 MG tablet Take 0.5 tablets (20 mg total) by mouth every evening. (Patient taking differently: Take 40 mg by mouth every other day.)     Family History       Problem Relation (Age of Onset)    Febrile seizures Mother    Heart failure Father          Tobacco Use    Smoking status: Former Smoker     Packs/day: 3.00     Years: 50.00     Pack years: 150.00     Types: Cigarettes     Start date: 3/30/1964     Quit date: 2006     Years since quittin.0    Smokeless tobacco: Never Used    Tobacco comment: ex smoker 15 years   Substance and Sexual Activity    Alcohol use: Yes    Drug use: No    Sexual activity: Never     Review of Systems   Constitutional:  Negative for chills and fever.   HENT:  Negative for congestion and rhinorrhea.    Eyes:  Negative for photophobia and visual disturbance.   Respiratory:  Negative for cough and shortness of breath.    Cardiovascular:  Positive for chest pain. Negative for leg swelling.   Gastrointestinal:  Negative for abdominal pain and nausea.   Genitourinary:  Negative for dysuria, frequency and urgency.   Musculoskeletal:  Positive for arthralgias (chronic). Negative for myalgias.   Skin:  Negative for rash.   Neurological:  Negative for dizziness and weakness.   Objective:     Vital Signs (Most Recent):  Temp: 98 °F (36.7 °C)  (03/15/22 2245)  Pulse: 69 (03/16/22 0303)  Resp: 18 (03/15/22 2245)  BP: 132/60 (03/16/22 0303)  SpO2: 99 % (03/16/22 0303) Vital Signs (24h Range):  Temp:  [97.8 °F (36.6 °C)-98.1 °F (36.7 °C)] 98 °F (36.7 °C)  Pulse:  [55-69] 69  Resp:  [14-20] 18  SpO2:  [98 %-100 %] 99 %  BP: (108-157)/(40-70) 132/60        There is no height or weight on file to calculate BMI.    Physical Exam  Vitals and nursing note reviewed.   Constitutional:       General: She is not in acute distress.     Appearance: She is well-developed.   HENT:      Head: Normocephalic and atraumatic.      Mouth/Throat:      Mouth: Mucous membranes are moist.   Eyes:      Conjunctiva/sclera: Conjunctivae normal.      Pupils: Pupils are equal, round, and reactive to light.   Cardiovascular:      Rate and Rhythm: Normal rate and regular rhythm.      Heart sounds: Normal heart sounds.   Pulmonary:      Effort: No respiratory distress.      Breath sounds: Normal breath sounds. No wheezing.      Comments: On 2L NC  Abdominal:      General: Bowel sounds are normal. There is no distension.      Palpations: Abdomen is soft.      Tenderness: There is no abdominal tenderness.   Musculoskeletal:         General: No tenderness. Normal range of motion.      Cervical back: Normal range of motion and neck supple.      Right lower leg: No edema.      Left lower leg: No edema.   Lymphadenopathy:      Cervical: No cervical adenopathy.   Skin:     General: Skin is warm and dry.      Capillary Refill: Capillary refill takes less than 2 seconds.      Comments: Chronic skin changes to bilateral LE   Neurological:      General: No focal deficit present.      Mental Status: She is alert and oriented to person, place, and time.      Cranial Nerves: No cranial nerve deficit.   Psychiatric:         Behavior: Behavior normal.         Thought Content: Thought content normal.         Judgment: Judgment normal.         CRANIAL NERVES     CN III, IV, VI   Pupils are equal, round, and  reactive to light.     Significant Labs: All pertinent labs within the past 24 hours have been reviewed.  CBC:   Recent Labs   Lab 03/15/22  0415 03/16/22  0025   WBC 5.99 6.39   HGB 8.5* 8.3*   HCT 28.8* 27.4*   * 123*     CMP:   Recent Labs   Lab 03/15/22  0415 03/16/22  0025    140   K 5.4* 5.3*    110   CO2 21* 20*   GLU 90 106   BUN 72* 74*   CREATININE 1.7* 1.8*   CALCIUM 9.1 8.8   PROT  --  5.6*   ALBUMIN  --  2.9*   BILITOT  --  0.4   ALKPHOS  --  95   AST  --  14   ALT  --  13   ANIONGAP 8 10   EGFRNONAA 29.3* 27.3*     Cardiac Markers:   Recent Labs   Lab 03/16/22  0025   *     Troponin:   Recent Labs   Lab 03/16/22 0025   TROPONINI 0.014       Significant Imaging: I have reviewed all pertinent imaging results/findings within the past 24 hours.

## 2022-03-16 NOTE — HPI
"Marisol Kerr is a 76 y.o. female with CAD, CHF, HTN, HLD, T2DM, CKD, and COPD on 2 L nasal cannula who presented to the ED from [facility] for evaluation of chest pain. She was laying in bed when she noticed a substernal chest tightness that felt like a "chest cold". The pain was 10/10 but improved on its own prior to receiving NTG with EMS. She denies associated nausea or diaphoresis. She says the pain is different from her previous chest pain, but it scared her. She did have an episode of her usual chest pain a few days ago that was severe and relieved with NTG. She denies SOB, cough, abdominal pain, LE edema.     Of note, patient was admitted on 2/11/22 for NSTEMI. Interventional cardiology and CTS were both consulted. CABG was not recommended due to patient's debility. They were planning for LHC, however patient developed a rectal sheath hematoma requiring IR embolization. Patient had anemia requiring blood transfusions later in the admission. Interventional cardiology defered procedure to outpatient setting due to recent bleed. Patient was discharged to SNF on 2/22. Has appointment with Dr. Chappell for possible PCI on 3/17/2022.     ED: VSSAF. H/H stable. K 5.4. Cr 1.7. . Troponin 0.014 (lower than prev admission). EKG without acute ST changes.   "

## 2022-03-16 NOTE — HOSPITAL COURSE
76 y.o. who was admitted to hospital medicine for chest pain. Pt was hemodynamically stable. ECG was negative for ST changes. Trop flat x2. CXR showed the cardiomediastinal silhouette is stable and not significantly enlarged.  There are mild reticular opacities again seen bilaterally in the infrahilar region similar to the prior exam suggesting chronic interstitial changes. There is no acute lobar consolidation. There is increased lucency specially over the upper lungs as seen with COPD.  There is no evidence of pleural effusion or pneumothorax. Osseous structures grossly appear stable with degenerative changes of the shoulders noted. Cardiology and interventional cardiology followed. Interventional cards had no plans for angiogram at this time. Pt was continued on 81 mg ASA qd, Atorvastatin 80mg qhs, Metoprolol 50 mg XL, Ticagrelor 90mg bid. Pt given follow up with Dr. Chappell 4/7/22 in interventional clinic at discharged. Pt medically ready and returned to [facility] at discharge. Pt educated on hospital course and plan, verbally agrees and understands. All questions answered.

## 2022-03-16 NOTE — ED TRIAGE NOTES
Pt arrives from [facility] for substernal chest pain starting earlier today. Pt denies SOB but states her chest feels tight. Pt wears 2L at baseline. Per SNF, K+ 5.4, given kayexalate with multiple BMs since administration. Pt admitted to SNF after NSTEMI in February. Pt given 325 ASA and 2 SL nitro by EMS with minimal relief.

## 2022-03-16 NOTE — HPI
This is a 76 year old lady with known severe multivessel disease, HFpEF (EF 65% with G2DD) HTN, HLD, T2DM, CKD, and COPD on 2 L nasal cannula presenting from [facility] for chest pain. Patient states that she was lying down when she had a substernal chest tightness that improved with sublingual nitro. She was recently admitted on 2/12 to CCU for NSTEMI for severe multi-vessel disease requiring intervention. Plans for [facility] but hospital course was complicated by a rectus sheath hematoma deferring the procedure outpatient with Dr. Chappell on 3/17. Patient was discharged to SNF for debility until she would see Dr. Chappell. She states that in SNF, she had felt much better but occasionally had some chest pain that resolved with nitro. She states the pain that she felt today was worse than previous which prompted her to come to the hospital.     In the ED, VSS. K was elevated to 5.4 and Cr was 1.7. BNP of 209. Patient also had slightly elevated troponin 0.014 but much lower than previous admission. EKG showed no significant changes.     Upon chart review,   Echo on 2/12/22 showed:  The left ventricle is normal in size with normal systolic function.  The estimated ejection fraction is 65%.  There is a minor septal wall motion abnormality.  Severe left atrial enlargement.  Grade II left ventricular diastolic dysfunction.  Normal right ventricular size with normal right ventricular systolic function.  Normal central venous pressure (3 mmHg).      Angiogram, Coronary, with Left Heart Cath[02/10/22]  The RPAV lesion was 100% stenosed.  The RPDA lesion was 100% stenosed.  The Prox Cx to Mid Cx lesion was 80% stenosed.  The Dist Cx lesion was 70% stenosed.  The 1st LPL lesion was 90% stenosed.  The Prox RCA-2 lesion was 70% stenosed.  The Prox RCA-1 lesion was 90% stenosed.  The Mid LAD-2 lesion was 60% stenosed.  The estimated blood loss was <50 mL.  There was three vessel coronary artery disease.

## 2022-03-17 LAB
ANION GAP SERPL CALC-SCNC: 9 MMOL/L (ref 8–16)
BASOPHILS # BLD AUTO: 0.03 K/UL (ref 0–0.2)
BASOPHILS NFR BLD: 0.6 % (ref 0–1.9)
BUN SERPL-MCNC: 65 MG/DL (ref 8–23)
CALCIUM SERPL-MCNC: 8.9 MG/DL (ref 8.7–10.5)
CHLORIDE SERPL-SCNC: 110 MMOL/L (ref 95–110)
CO2 SERPL-SCNC: 25 MMOL/L (ref 23–29)
CREAT SERPL-MCNC: 1.5 MG/DL (ref 0.5–1.4)
DIFFERENTIAL METHOD: ABNORMAL
EOSINOPHIL # BLD AUTO: 0.1 K/UL (ref 0–0.5)
EOSINOPHIL NFR BLD: 2.8 % (ref 0–8)
ERYTHROCYTE [DISTWIDTH] IN BLOOD BY AUTOMATED COUNT: 14.9 % (ref 11.5–14.5)
EST. GFR  (AFRICAN AMERICAN): 39.3 ML/MIN/1.73 M^2
EST. GFR  (NON AFRICAN AMERICAN): 34.1 ML/MIN/1.73 M^2
FERRITIN SERPL-MCNC: 187 NG/ML (ref 20–300)
GLUCOSE SERPL-MCNC: 86 MG/DL (ref 70–110)
HCT VFR BLD AUTO: 27 % (ref 37–48.5)
HGB BLD-MCNC: 8.3 G/DL (ref 12–16)
IMM GRANULOCYTES # BLD AUTO: 0.02 K/UL (ref 0–0.04)
IMM GRANULOCYTES NFR BLD AUTO: 0.4 % (ref 0–0.5)
LYMPHOCYTES # BLD AUTO: 1.1 K/UL (ref 1–4.8)
LYMPHOCYTES NFR BLD: 21.6 % (ref 18–48)
MAGNESIUM SERPL-MCNC: 1.8 MG/DL (ref 1.6–2.6)
MCH RBC QN AUTO: 28.6 PG (ref 27–31)
MCHC RBC AUTO-ENTMCNC: 30.7 G/DL (ref 32–36)
MCV RBC AUTO: 93 FL (ref 82–98)
MONOCYTES # BLD AUTO: 0.5 K/UL (ref 0.3–1)
MONOCYTES NFR BLD: 9 % (ref 4–15)
NEUTROPHILS # BLD AUTO: 3.3 K/UL (ref 1.8–7.7)
NEUTROPHILS NFR BLD: 65.6 % (ref 38–73)
NRBC BLD-RTO: 0 /100 WBC
PHOSPHATE SERPL-MCNC: 4.1 MG/DL (ref 2.7–4.5)
PLATELET # BLD AUTO: 133 K/UL (ref 150–450)
PMV BLD AUTO: 12.8 FL (ref 9.2–12.9)
POCT GLUCOSE: 130 MG/DL (ref 70–110)
POCT GLUCOSE: 85 MG/DL (ref 70–110)
POCT GLUCOSE: 99 MG/DL (ref 70–110)
POCT GLUCOSE: 99 MG/DL (ref 70–110)
POTASSIUM SERPL-SCNC: 4.1 MMOL/L (ref 3.5–5.1)
RBC # BLD AUTO: 2.9 M/UL (ref 4–5.4)
RETICS/RBC NFR AUTO: 3.7 % (ref 0.5–2.5)
SODIUM SERPL-SCNC: 144 MMOL/L (ref 136–145)
WBC # BLD AUTO: 5.09 K/UL (ref 3.9–12.7)

## 2022-03-17 PROCEDURE — 25000003 PHARM REV CODE 250: Performed by: PHYSICIAN ASSISTANT

## 2022-03-17 PROCEDURE — A4216 STERILE WATER/SALINE, 10 ML: HCPCS | Performed by: PHYSICIAN ASSISTANT

## 2022-03-17 PROCEDURE — 27000221 HC OXYGEN, UP TO 24 HOURS

## 2022-03-17 PROCEDURE — 94761 N-INVAS EAR/PLS OXIMETRY MLT: CPT

## 2022-03-17 PROCEDURE — 99226 PR SUBSEQUENT OBSERVATION CARE,LEVEL III: CPT | Mod: ICN,,, | Performed by: PHYSICIAN ASSISTANT

## 2022-03-17 PROCEDURE — 80048 BASIC METABOLIC PNL TOTAL CA: CPT | Performed by: PHYSICIAN ASSISTANT

## 2022-03-17 PROCEDURE — 84100 ASSAY OF PHOSPHORUS: CPT | Performed by: PHYSICIAN ASSISTANT

## 2022-03-17 PROCEDURE — 36415 COLL VENOUS BLD VENIPUNCTURE: CPT | Performed by: PHYSICIAN ASSISTANT

## 2022-03-17 PROCEDURE — 85025 COMPLETE CBC W/AUTO DIFF WBC: CPT | Performed by: PHYSICIAN ASSISTANT

## 2022-03-17 PROCEDURE — 99226 PR SUBSEQUENT OBSERVATION CARE,LEVEL III: ICD-10-PCS | Mod: ICN,,, | Performed by: PHYSICIAN ASSISTANT

## 2022-03-17 PROCEDURE — 83735 ASSAY OF MAGNESIUM: CPT | Performed by: PHYSICIAN ASSISTANT

## 2022-03-17 PROCEDURE — 85045 AUTOMATED RETICULOCYTE COUNT: CPT | Performed by: PHYSICIAN ASSISTANT

## 2022-03-17 PROCEDURE — G0378 HOSPITAL OBSERVATION PER HR: HCPCS

## 2022-03-17 PROCEDURE — 99900035 HC TECH TIME PER 15 MIN (STAT)

## 2022-03-17 PROCEDURE — 82728 ASSAY OF FERRITIN: CPT | Performed by: PHYSICIAN ASSISTANT

## 2022-03-17 RX ADMIN — FUROSEMIDE 40 MG: 40 TABLET ORAL at 08:03

## 2022-03-17 RX ADMIN — TICAGRELOR 90 MG: 90 TABLET ORAL at 08:03

## 2022-03-17 RX ADMIN — ATORVASTATIN CALCIUM 80 MG: 40 TABLET, FILM COATED ORAL at 08:03

## 2022-03-17 RX ADMIN — ISOSORBIDE MONONITRATE 30 MG: 30 TABLET, EXTENDED RELEASE ORAL at 08:03

## 2022-03-17 RX ADMIN — AMLODIPINE BESYLATE 10 MG: 10 TABLET ORAL at 08:03

## 2022-03-17 RX ADMIN — GABAPENTIN 100 MG: 100 CAPSULE ORAL at 08:03

## 2022-03-17 RX ADMIN — ASPIRIN 81 MG CHEWABLE TABLET 81 MG: 81 TABLET CHEWABLE at 08:03

## 2022-03-17 RX ADMIN — Medication 10 ML: at 08:03

## 2022-03-17 RX ADMIN — PANTOPRAZOLE SODIUM 40 MG: 40 TABLET, DELAYED RELEASE ORAL at 08:03

## 2022-03-17 NOTE — SUBJECTIVE & OBJECTIVE
Past Medical History:   Diagnosis Date    CAD (coronary artery disease)     Cancer     colon    CHF (congestive heart failure)     Colitis     Colon cancer     COPD (chronic obstructive pulmonary disease)     Decreased hearing     Diabetes mellitus     Diabetes mellitus, type 2     Hypercholesterolemia     Hypertension     Hypoxemia     Insomnia     Obesity     Osteoarthritis        Past Surgical History:   Procedure Laterality Date    ANGIOGRAM, CORONARY, WITH LEFT HEART CATHETERIZATION N/A 2/10/2022    Procedure: Angiogram, Coronary, with Left Heart Cath;  Surgeon: Cristian Benjamin MD;  Location: [facility] CATH/EP LAB;  Service: Cardiology;  Laterality: N/A;    CARDIAC CATHETERIZATION      CHOLECYSTECTOMY      COLECTOMY      CORONARY ANGIOPLASTY      CORONARY STENT PLACEMENT      EXTERNAL EAR SURGERY      EYE SURGERY      FLEXIBLE SIGMOIDOSCOPY N/A 9/19/2019    Procedure: SIGMOIDOSCOPY, FLEXIBLE;  Surgeon: Biju Kramer III, MD;  Location: [facility] ENDO;  Service: Endoscopy;  Laterality: N/A;    HYSTERECTOMY      partial       Review of patient's allergies indicates:   Allergen Reactions    Iodinated contrast media     Strawberries [strawberry] Hives and Itching       PTA Medications   Medication Sig    amLODIPine (NORVASC) 10 MG tablet Take 1 tablet (10 mg total) by mouth once daily.    atorvastatin (LIPITOR) 80 MG tablet Take 1 tablet (80 mg total) by mouth every evening.    furosemide (LASIX) 20 MG tablet Take 2 tablets (40 mg total) by mouth once daily.    lisinopriL 10 MG tablet Take 1 tablet (10 mg total) by mouth once daily.    metoprolol succinate (TOPROL-XL) 50 MG 24 hr tablet Take 1 tablet (50 mg total) by mouth once daily.    pantoprazole (PROTONIX) 40 MG tablet Take 1 tablet (40 mg total) by mouth once daily.    acetaminophen (TYLENOL) 325 MG tablet Take 2 tablets (650 mg total) by mouth every 4 (four) hours as needed.    albuterol (VENTOLIN HFA) 90 mcg/actuation inhaler Inhale 2 puffs into the lungs  every 6 (six) hours as needed for Wheezing or Shortness of Breath. Rescue    aspirin (ECOTRIN) 81 MG EC tablet Take 81 mg by mouth every morning.     bromfenac (PROLENSA) 0.07 % Drop Place 1 drop into both eyes daily as needed.    coenzyme Q10 100 mg capsule Take 100 mg by mouth every morning.    dextran 70-hypromellose 0.1-0.3 % Drop Place 1 drop into both eyes daily as needed.    ergocalciferol (VITAMIN D2) 50,000 unit Cap Take 1 capsule (50,000 Units total) by mouth every 7 days.    fish oil-omega-3 fatty acids 300-1,000 mg capsule Take 1 capsule by mouth every morning.    FLAXSEED OIL ORAL Take 1,200 mg by mouth every morning.    fluticasone-salmeterol diskus inhaler 250-50 mcg Inhale 1 puff into the lungs 2 (two) times daily.    ipratropium-albuteroL (COMBIVENT RESPIMAT)  mcg/actuation inhaler Inhale 2 puffs into the lungs every 4 (four) hours as needed for Shortness of Breath. Rescue    NARCAN 4 mg/actuation Spry     nitroGLYCERIN 0.2 mg/hr TD PT24 (NITRODUR) 0.2 mg/hr APPLY 1 PATCH TOPICALLY ONCE DAILY TO LEG.    nitroGLYCERIN 0.4 MG/DOSE TL SPRY (NITROLINGUAL) 400 mcg/spray spray USE ONE SPRAY UNDER THE TONGUE EVERY 5 MINUTES AS NEEDED FOR CHEST PAIN.  DO NOT EXCEED A TOTAL OF 3 SPRAYS IN 15 MINUTES    ondansetron (ZOFRAN-ODT) 4 MG TbDL Take 2 tablets (8 mg total) by mouth every 6 (six) hours as needed.    PNV NO.115/IRON FUMARATE/FA (PRENATAL #115-IRON-FOLIC ACID ORAL) Take 1 tablet by mouth every morning.     temazepam (RESTORIL) 15 mg Cap Take 1 capsule (15 mg total) by mouth every evening.    ticagrelor (BRILINTA) 90 mg tablet Take 1 tablet (90 mg total) by mouth 2 (two) times a day.    triamcinolone acetonide 0.1% (KENALOG) 0.1 % cream Apply topically 2 (two) times daily. For legs.    umeclidinium (INCRUSE ELLIPTA) 62.5 mcg/actuation inhalation capsule Inhale 1 capsule (63 mcg total) into the lungs every morning. Controller    vit C/E/Zn/coppr/lutein/zeaxan (PRESERVISION AREDS-2 ORAL) Take 1  capsule by mouth every morning.     Family History       Problem Relation (Age of Onset)    Febrile seizures Mother    Heart failure Father          Tobacco Use    Smoking status: Former Smoker     Packs/day: 3.00     Years: 50.00     Pack years: 150.00     Types: Cigarettes     Start date: 3/30/1964     Quit date: 2006     Years since quittin.0    Smokeless tobacco: Never Used    Tobacco comment: ex smoker 15 years   Substance and Sexual Activity    Alcohol use: Yes    Drug use: No    Sexual activity: Never     Review of Systems   Constitutional: Negative for fever and malaise/fatigue.   HENT:  Negative for congestion and sore throat.    Eyes:  Negative for blurred vision, vision loss in left eye and vision loss in right eye.   Cardiovascular:  Positive for chest pain and dyspnea on exertion. Negative for irregular heartbeat, leg swelling, near-syncope, palpitations and syncope.   Respiratory:  Positive for shortness of breath. Negative for wheezing.    Endocrine: Negative.    Hematologic/Lymphatic: Negative.    Skin: Negative.    Musculoskeletal:  Negative for arthritis, back pain, joint pain and myalgias.   Gastrointestinal:  Negative for abdominal pain, hematemesis, hematochezia and melena.   Genitourinary: Negative.    Neurological:  Negative for light-headedness and loss of balance.   Psychiatric/Behavioral: Negative.     Objective:     Vital Signs (Most Recent):  Temp: 98.1 °F (36.7 °C) (22 0753)  Pulse: (!) 58 (22 0753)  Resp: 18 (22)  BP: 132/63 (22)  SpO2: 97 % (22)   Vital Signs (24h Range):  Temp:  [97.8 °F (36.6 °C)-98.4 °F (36.9 °C)] 98.1 °F (36.7 °C)  Pulse:  [51-67] 58  Resp:  [16-20] 18  SpO2:  [95 %-100 %] 97 %  BP: (107-156)/(50-71) 132/63     Weight: 87.2 kg (192 lb 3.9 oz)  Body mass index is 34.05 kg/m².    SpO2: 97 %  O2 Device (Oxygen Therapy): nasal cannula      Intake/Output Summary (Last 24 hours) at 3/17/2022 9537  Last data filed at  3/17/2022 0828  Gross per 24 hour   Intake 25 ml   Output 2600 ml   Net -2575 ml       Lines/Drains/Airways       Drain  Duration             Female External Urinary Catheter 02/22/22 1900 22 days              Peripheral Intravenous Line  Duration                  Peripheral IV - Single Lumen 03/16/22 0020 20 G Left;Posterior Hand 1 day                    Physical Exam  Vitals and nursing note reviewed.   Constitutional:       Appearance: Normal appearance. She is obese.   HENT:      Mouth/Throat:      Mouth: Mucous membranes are moist.   Eyes:      General: No scleral icterus.     Extraocular Movements: Extraocular movements intact.   Neck:      Vascular: No carotid bruit.   Cardiovascular:      Rate and Rhythm: Normal rate and regular rhythm.      Pulses: Normal pulses.      Heart sounds: Normal heart sounds. No murmur heard.    No gallop.   Pulmonary:      Effort: Pulmonary effort is normal. No respiratory distress.      Breath sounds: Normal breath sounds. No wheezing.   Abdominal:      General: Abdomen is flat. Bowel sounds are normal.      Palpations: Abdomen is soft.      Comments: Small solid abdominal mass where rectus sheath hematoma was located    Musculoskeletal:      Cervical back: Normal range of motion.      Right lower leg: No edema.      Left lower leg: No edema.   Skin:     General: Skin is warm and dry.      Capillary Refill: Capillary refill takes less than 2 seconds.   Neurological:      General: No focal deficit present.      Mental Status: She is alert and oriented to person, place, and time. Mental status is at baseline.       Significant Labs: All pertinent lab results from the last 24 hours have been reviewed.    Significant Imaging: Echocardiogram: Transthoracic echo (TTE) complete (Cupid Only):   Results for orders placed or performed during the hospital encounter of 02/11/22   Echo   Result Value Ref Range    Ascending aorta 2.24 cm    STJ 2.11 cm    AV mean gradient 8 mmHg    Ao peak  asim 1.80 m/s    Ao VTI 36.43 cm    IVRT 91.34 msec    IVS 0.86 0.6 - 1.1 cm    LA size 4.33 cm    Left Atrium Major Axis 5.64 cm    Left Atrium Minor Axis 5.64 cm    LVIDd 4.74 3.5 - 6.0 cm    LVIDs 2.64 2.1 - 4.0 cm    LVOT diameter 2.00 cm    LVOT peak VTI 27.39 cm    Posterior Wall 0.89 0.6 - 1.1 cm    MV Peak A Asim 1.03 m/s    E wave deceleration time 194.92 msec    MV Peak E Asim 0.94 m/s    RA Major Axis 4.52 cm    RA Width 3.45 cm    RVDD 2.85 cm    Sinus 2.57 cm    TAPSE 3.70 cm    TDI LATERAL 0.05 m/s    TDI SEPTAL 0.05 m/s    LA WIDTH 4.64 cm    MV stenosis pressure 1/2 time 56.53 ms    LV Diastolic Volume 104.66 mL    LV Systolic Volume 25.44 mL    RV S' 20.60 cm/s    LVOT peak asim 1.21 m/s    LA volume (mod) 78.65 cm3    MV mean gradient 1 mmHg    MV peak gradient 5 mmHg    MV VTI 28.95 cm    LV LATERAL E/E' RATIO 18.80 m/s    LV SEPTAL E/E' RATIO 18.80 m/s    FS 44 %    LA volume 96.32 cm3    LV mass 139.43 g    Left Ventricle Relative Wall Thickness 0.38 cm    AV valve area 2.36 cm2    AV Velocity Ratio 0.67     AV index (prosthetic) 0.75     MV valve area p 1/2 method 3.89 cm2    MV valve area by continuity eq 2.97 cm2    E/A ratio 0.91     Mean e' 0.05 m/s    LVOT area 3.1 cm2    LVOT stroke volume 86.00 cm3    AV peak gradient 13 mmHg    E/E' ratio 18.80 m/s    LV Systolic Volume Index 13.3 mL/m2    LV Diastolic Volume Index 54.51 mL/m2    LA Volume Index 50.2 mL/m2    LV Mass Index 73 g/m2    LA Volume Index (Mod) 41.0 mL/m2    BSA 1.99 m2    Right Atrial Pressure (from IVC) 3 mmHg    EF 65 %    Narrative    · The left ventricle is normal in size with normal systolic function.  · The estimated ejection fraction is 65%.  · There is a minor septal wall motion abnormality.  · Severe left atrial enlargement.  · Grade II left ventricular diastolic dysfunction.  · Normal right ventricular size with normal right ventricular systolic   function.  · Normal central venous pressure (3 mmHg).

## 2022-03-17 NOTE — SUBJECTIVE & OBJECTIVE
Interval History: NAEON. Pt seen and evaluated at bedside this am. Pt is doing well. Interventional cards have no plans for angiography/intervention during this hospitalization. Will continue anemia workup, checking ferritin and retic.    Review of Systems   Constitutional:  Negative for activity change, chills, diaphoresis, fatigue and fever.   HENT:  Negative for congestion, facial swelling, rhinorrhea and sore throat.    Eyes:  Negative for photophobia, itching and visual disturbance.   Respiratory:  Negative for cough, chest tightness, shortness of breath and wheezing.    Cardiovascular:  Positive for chest pain. Negative for palpitations and leg swelling.   Gastrointestinal:  Negative for abdominal distention, abdominal pain, blood in stool, constipation, diarrhea, nausea and vomiting.   Genitourinary:  Negative for difficulty urinating, dysuria, frequency, hematuria and urgency.   Musculoskeletal:  Positive for arthralgias (chronic). Negative for back pain, myalgias and neck stiffness.   Skin:  Negative for rash.   Neurological:  Negative for dizziness, tremors, seizures, syncope, weakness, light-headedness, numbness and headaches.   Psychiatric/Behavioral:  Negative for agitation, confusion and hallucinations.    Objective:     Vital Signs (Most Recent):  Temp: 97.7 °F (36.5 °C) (03/17/22 1625)  Pulse: 64 (03/17/22 1625)  Resp: 18 (03/17/22 1625)  BP: 139/63 (03/17/22 1625)  SpO2: 100 % (03/17/22 1625) Vital Signs (24h Range):  Temp:  [97.7 °F (36.5 °C)-98.4 °F (36.9 °C)] 97.7 °F (36.5 °C)  Pulse:  [52-70] 64  Resp:  [18-20] 18  SpO2:  [95 %-100 %] 100 %  BP: (114-156)/(50-71) 139/63     Weight: 87.2 kg (192 lb 3.9 oz)  Body mass index is 34.05 kg/m².    Intake/Output Summary (Last 24 hours) at 3/17/2022 1625  Last data filed at 3/17/2022 0828  Gross per 24 hour   Intake 25 ml   Output 1050 ml   Net -1025 ml      Physical Exam  Vitals and nursing note reviewed.   Constitutional:       General: She is not in  acute distress.     Appearance: She is well-developed.   HENT:      Head: Normocephalic and atraumatic.      Mouth/Throat:      Mouth: Mucous membranes are moist.   Eyes:      Conjunctiva/sclera: Conjunctivae normal.      Pupils: Pupils are equal, round, and reactive to light.   Cardiovascular:      Rate and Rhythm: Normal rate and regular rhythm.      Heart sounds: Normal heart sounds.   Pulmonary:      Effort: No respiratory distress.      Breath sounds: Normal breath sounds. No wheezing.      Comments: On 2L NC  Abdominal:      General: Bowel sounds are normal. There is no distension.      Palpations: Abdomen is soft.      Tenderness: There is no abdominal tenderness.   Musculoskeletal:         General: No tenderness. Normal range of motion.      Cervical back: Normal range of motion and neck supple.      Right lower leg: No edema.      Left lower leg: No edema.   Lymphadenopathy:      Cervical: No cervical adenopathy.   Skin:     General: Skin is warm and dry.      Capillary Refill: Capillary refill takes less than 2 seconds.      Comments: Chronic skin changes to bilateral LE   Neurological:      General: No focal deficit present.      Mental Status: She is alert and oriented to person, place, and time.      Cranial Nerves: No cranial nerve deficit.   Psychiatric:         Behavior: Behavior normal.         Thought Content: Thought content normal.         Judgment: Judgment normal.       Significant Labs: All pertinent labs within the past 24 hours have been reviewed.    Significant Imaging: I have reviewed all pertinent imaging results/findings within the past 24 hours.

## 2022-03-17 NOTE — DISCHARGE SUMMARY
"[facility] Medicine  Discharge Summary      Patient Name: Marisol Kerr  MRN: [id number]  Patient Class: IP- SNF  Admission Date: 2/21/2022  Hospital Length of Stay: 22 days  Discharge Date and Time: 3/15/2022  Attending Physician: No att. providers found   Discharging Provider: Natalie Clemons NP  Primary Care Provider: Khadar Dwyer MD      HPI:   Ms Kerr is a 76 year old female with PMHx of DM2, HTN, HLD, CKD, COPD on home O2 who presents SNF following hospitalization for NSTEMI.  Admission to SNF for secondary to weakness and debility.     Patient initially presented to [facility] for a left heart catheterization to evaluate recently increasing episodes of angina requiring more frequent Nitroglycerin use. She has history of iodine allergy and was pretreated with prednisone, but post- procedure she developed SOB, respiratory distress requiring overnight hospitalization for monitoring. She also had elevated blood pressures with systolics greater than 200s during hospitalization. Overnight, as she woke up to urinate she felt 10/10 chest pain all over that she described as "burning", and "veins being ripped apart". STAT EKG at the time showed concerns for  lateral precordial ST depressions. [place] [facility] [place] and potential emergent intervention. LHC at outside hospital was notable for significant coraonary artery disease. Patient admitted to CCU while pending evaluation for ACS intervention, given her NSTEMI. Interventional Cardiology and Cardiothoracic Surgery consulted. Initiated guideline directed medical therapy for ACS.   CTS evaluation did not recommend patient for CABG due to her debility as she is prohibitively high risk for CABG. Patient had significant abdominal pain overnight and distended abdomen with mass. In light of this, IC deferred LHC. CT of the abdomen showed a large rectus sheath hematoma. Given patient's kidney " function, CTA was deferred temporarily but the abdominal mass continued to enlarge. CTA of the abdomen showed extravasation of contrast into the rectus sheath hematoma. IR consulted and performed embolization of the inferior epigastric and collateral arteries. Patient's Cr elevated to 1.9 likely due to contrast. Patient's Hgb dropped requiring transfusion with aim >10 for IC intervention. Patient was given 2 pRBC and Hgb responded up to 9.2. IC recommended that since patient had a recent bleed, would defer procedure for outpatient in 1-2 weeks. Patient discharged to SNF and has appointment with Dr. Chappell for possible PCI on 3/17/2022.      Today, patient is without major complaints.  She denies chest pain at this time.  Currently wearing supplemental O2.  Later in the day, patient reported dysuria to nursing.  UA with reflex ordered.     Patient will be treated at [facility] with PT and OT to improve functional status and ability to perform ADLs.     Hospital Course:   Patient prematurely discharge from SNF.  Patient sent to ED for evaluation of chest pain.  See epic for full details.        Goals of Care Treatment Preferences:  Code Status: Full Code      Consults:   Consults (From admission, onward)        Status Ordering Provider     Inpatient consult to Registered Dietitian/Nutritionist  Once        Provider:  (Not yet assigned)    CONNIE Nails          No new Assessment & Plan notes have been filed under this hospital service since the last note was generated.  Service: Hospital Medicine    Final Active Diagnoses:    Diagnosis Date Noted POA    PRINCIPAL PROBLEM:  NSTEMI (non-ST elevated myocardial infarction) [I21.4] 02/11/2022 Yes    Rectus sheath hematoma [S30.1XXA] 02/14/2022 Yes    Acute on chronic respiratory failure [J96.20] 02/10/2022 Yes    Chronic diastolic congestive heart failure [I50.32] 09/17/2019 Yes    Chronic hypoxemic respiratory failure [J96.11] 01/16/2019 Yes     Chronic respiratory failure [J96.10] 04/25/2018 Yes    CKD (chronic kidney disease), stage III [N18.30] 10/08/2014 Yes    Diabetic peripheral vascular disease [E11.51] 06/26/2014 Yes    Coronary artery disease, multivessel with history of previous PCI [I25.10] 06/03/2013 Yes    DM type 2, controlled, with complication [E11.8] 06/03/2013 Yes      Problems Resolved During this Admission:       Discharged Condition: poor    Disposition: Admitted as an Inpatient    Follow Up:    Patient Instructions:   No discharge procedures on file.    Significant Diagnostic Studies: Labs:   BMP:   Recent Labs   Lab 03/16/22  0439 03/16/22  1345 03/17/22  0306   GLU 96 95 86    144 144   K 5.2* 4.9 4.1   * 110 110   CO2 22* 24 25   BUN 70* 62* 65*   CREATININE 1.6* 1.5* 1.5*   CALCIUM 8.8 9.1 8.9   MG 1.8  --  1.8       Pending Diagnostic Studies:     None         Medications:  Reconciled Home Medications:      Medication List      ASK your doctor about these medications    acetaminophen 325 MG tablet  Commonly known as: TYLENOL  Take 2 tablets (650 mg total) by mouth every 4 (four) hours as needed.     albuterol 90 mcg/actuation inhaler  Commonly known as: VENTOLIN HFA  Inhale 2 puffs into the lungs every 6 (six) hours as needed for Wheezing or Shortness of Breath. Rescue     amLODIPine 10 MG tablet  Commonly known as: NORVASC  Take 1 tablet (10 mg total) by mouth once daily.     aspirin 81 MG EC tablet  Commonly known as: ECOTRIN  Take 81 mg by mouth every morning.     atorvastatin 80 MG tablet  Commonly known as: LIPITOR  Take 1 tablet (80 mg total) by mouth every evening.     benazepriL 40 MG tablet  Commonly known as: LOTENSIN  Take 1 tablet (40 mg total) by mouth once daily.     bromfenac 0.07 % Drop  Commonly known as: PROLENSA  Place 1 drop into both eyes daily as needed.     coenzyme Q10 100 mg capsule  Take 100 mg by mouth every morning.     CombiVENT RESPIMAT  mcg/actuation inhaler  Generic drug:  ipratropium-albuteroL  Inhale 2 puffs into the lungs every 4 (four) hours as needed for Shortness of Breath. Rescue     dextran 70-hypromellose 0.1-0.3 % Drop  Place 1 drop into both eyes daily as needed.     ergocalciferol 50,000 unit Cap  Commonly known as: VITAMIN D2  Take 1 capsule (50,000 Units total) by mouth every 7 days.     fish oil-omega-3 fatty acids 300-1,000 mg capsule  Take 1 capsule by mouth every morning.     FLAXSEED OIL ORAL  Take 1,200 mg by mouth every morning.     fluticasone-salmeterol 250-50 mcg/dose 250-50 mcg/dose diskus inhaler  Commonly known as: ADVAIR DISKUS  Inhale 1 puff into the lungs 2 (two) times daily.     furosemide 20 MG tablet  Commonly known as: LASIX  Take 2 tablets (40 mg total) by mouth once daily.     INCRUSE ELLIPTA 62.5 mcg/actuation inhalation capsule  Generic drug: umeclidinium  Inhale 1 capsule (63 mcg total) into the lungs every morning. Controller     lisinopriL 10 MG tablet  Take 1 tablet (10 mg total) by mouth once daily.     lovastatin 40 MG tablet  Commonly known as: MEVACOR  Take 0.5 tablets (20 mg total) by mouth every evening.     metoprolol succinate 50 MG 24 hr tablet  Commonly known as: TOPROL-XL  Take 1 tablet (50 mg total) by mouth once daily.     NARCAN 4 mg/actuation Spry  Generic drug: naloxone     * nitroGLYCERIN 0.2 mg/hr TD PT24 0.2 mg/hr  Commonly known as: NITRODUR  APPLY 1 PATCH TOPICALLY ONCE DAILY TO LEG.     * nitroGLYCERIN 0.4 MG/DOSE TL SPRY 400 mcg/spray spray  Commonly known as: NITROLINGUAL  USE ONE SPRAY UNDER THE TONGUE EVERY 5 MINUTES AS NEEDED FOR CHEST PAIN.  DO NOT EXCEED A TOTAL OF 3 SPRAYS IN 15 MINUTES     ondansetron 4 MG Tbdl  Commonly known as: ZOFRAN-ODT  Take 2 tablets (8 mg total) by mouth every 6 (six) hours as needed.     pantoprazole 40 MG tablet  Commonly known as: PROTONIX  Take 1 tablet (40 mg total) by mouth once daily.     predniSONE 20 MG tablet  Commonly known as: DELTASONE  Take 1 tablet (20 mg total) by mouth As  instructed. Take 3 tabs the night before procedure & 3 tabs morning of procedure     PRENATAL #115-IRON-FOLIC ACID ORAL  Take 1 tablet by mouth every morning.     PRESERVISION AREDS-2 ORAL  Take 1 capsule by mouth every morning.     temazepam 15 mg Cap  Commonly known as: RESTORIL  Take 1 capsule (15 mg total) by mouth every evening.     ticagrelor 90 mg tablet  Commonly known as: BRILINTA  Take 1 tablet (90 mg total) by mouth 2 (two) times a day.     triamcinolone acetonide 0.1% 0.1 % cream  Commonly known as: KENALOG  Apply topically 2 (two) times daily. For legs.         * This list has 2 medication(s) that are the same as other medications prescribed for you. Read the directions carefully, and ask your doctor or other care provider to review them with you.                Indwelling Lines/Drains at time of discharge:   Lines/Drains/Airways     Drain  Duration           Female External Urinary Catheter 02/22/22 1900 22 days                Time spent on the discharge of patient: 0 minutes         Natalie Clemons NP  Department of Hospital Medicine  [facility]

## 2022-03-17 NOTE — HOSPITAL COURSE
Patient prematurely discharge from SNF.  Patient sent to ED for evaluation of chest pain.  See epic for full details.

## 2022-03-17 NOTE — HPI
76 year old lady with known severe multivessel disease, HFpEF (EF 65% with G2DD) HTN, HLD, T2DM, CKD, and COPD on 2 L nasal cannula presenting from [facility] for chest pain    She is known to Dr. Womack.  Presented in 2/2022 for NSTEMI.  Started on ACS.  Was transferred from [facility] and found to have 60% pLAD stenosis, tandem LCx stenosis (80% prox, 70% distal), 90% mRCA stenosis and occluded distal RCA stenosis with patent R to L collateral, 100% stenosed PRAV, and 100% RPDA stenosis.  At that hospitalization she was denied CABG by CTS given her co morbidities.  Was going to have a LHC with high risk PCI however started to have abdominal pain and found to have large rectus sheath hematoma (CT non contrast diagnosis given her ISSA).  She went to IR and performed embolization of inferior epigastric and collateral arteries.  Moreover she had an ISSA and decision made to defer IC intervention during this time.  Was discharged to SNF with future appointment on 3/17 for high risk PCI consideration    She presented back to the hospital on 3/15 with chest pain.  EKG stable.  Trops minimally elevated to 0.014 and 0.015.  Cr is 1.5, hgb 8.3.  She is allergic to contrast.  IC consulted for consideration of high risk PCI this admission

## 2022-03-17 NOTE — PLAN OF CARE
Hospital Medicine Review Note    Brief HPI: Ms. Marisol Kerr is a 76 y.o. female with severe multivessel disease, HFpEF (EF 65% with G2DD) HTN, HLD, T2DM, CKD, and COPD on 2 L nasal cannula admitted for CAD and unstable angina. Cardiology consulted for assistance in management.     Coronary artery disease, multivessel with history of previous PCI  NSTEMI  Unstable Angina    Patient had plans for outpatient PCI intervention with Dr. Chappell on 3/17. Patient has known lesiosn in the RPAV, RPDA, Circumflex, LPL, RCA, and LAD. ANUSHKA of 6 and Genet of 136. [place] [facility] [place] Likely angina from known vessel disease with future plans for intervention. Dr. Chappell will see patient in clinic in 2-4 weeks for outpatient intervention.     Plan:  - anemia workup (iron studies, folate, b12, etc.) with aims of Hgb >10  - continue 81 mg ASA qd  - Atorvastatin 80mg qhs  - continue Metoprolol to aim for HR 50-60s   - continue Ticagrelor 90mg bid  - titrate up imdur to aim for BP systolic 100-120s  - will need to f/u with Dr. Chappell in 2-4 weeks in interventional clinic once discharged      Thank you for involving us in the care of Ms. Kerr. Cardiology will sign off at this time. Please contact us for any further questions.    Becca Jacobson MD  Department of Hospital Medicine   [facility]

## 2022-03-17 NOTE — ASSESSMENT & PLAN NOTE
- TTE notable for Grade II left ventricular diastolic dysfunction, EF 65%  - continue Lasix 40 mg BID, lisinopril 10 mg daily  - Fluid restriction at 1.5L with strict I/Os and daily weights

## 2022-03-17 NOTE — ASSESSMENT & PLAN NOTE
- EKG without ST changes, troponin 0.014, will trend  - Recent hx of increasing episodes of angina requiring more frequent Nitroglycerin use and underwent LHC at OSH.   -Recent Left heart cath[02/11/22] showed:  · The RPAV lesion was 100% stenosed.  · The RPDA lesion was 100% stenosed.  · The Prox Cx to Mid Cx lesion was 80% stenosed.  · The 1st LPL lesion was 90% stenosed.  · The Prox RCA-2 lesion was 70% stenosed.  · The Prox RCA-1 lesion was 90% stenosed.  · The Mid LAD-2 lesion was 60% stenosed.  - pt scheduled with cards on 3/17 for follow up appointment, will consult  - interventional cardiology consulted, no plans for angiogram at this time  - continue 81 mg ASA qd, Atorvastatin 80mg qhs, Metoprolol 50 mg XL, Ticagrelor 90mg bid  - EKG prn chest pain  - NTG prn chest pain   Information: Selecting Yes will display possible errors in your note based on the variables you have selected. This validation is only offered as a suggestion for you. PLEASE NOTE THAT THE VALIDATION TEXT WILL BE REMOVED WHEN YOU FINALIZE YOUR NOTE. IF YOU WANT TO FAX A PRELIMINARY NOTE YOU WILL NEED TO TOGGLE THIS TO 'NO' IF YOU DO NOT WANT IT IN YOUR FAXED NOTE.

## 2022-03-17 NOTE — CONSULTS
[facility] - Telemetry Stepdown ([place] [address])  Interventional Cardiology  Consult Note    Patient Name: Marisol Kerr  MRN: [id number]  Admission Date: 3/15/2022  Hospital Length of Stay: 0 days  Code Status: Full Code   Attending Provider: Nico Street MD  Consulting Provider: Miles Butcher MD  Primary Care Physician: Khadar Dwyer MD  Principal Problem:Chest pain    Patient information was obtained from patient and ER records.     Inpatient consult to Interventional Cardiology  Consult performed by: Miles Butcher MD  Consult ordered by: Jerome Dutta DO        Subjective:     Chief Complaint:  Chest pain     HPI:   76 year old lady with known severe multivessel disease, HFpEF (EF 65% with G2DD) HTN, HLD, T2DM, CKD, and COPD on 2 L nasal cannula presenting from [facility] for chest pain    She is known to Dr. Womack.  Presented in 2/2022 for NSTEMI.  Started on ACS.  Was transferred from [facility] and found to have 60% pLAD stenosis, tandem LCx stenosis (80% prox, 70% distal), 90% mRCA stenosis and occluded distal RCA stenosis with patent R to L collateral, 100% stenosed PRAV, and 100% RPDA stenosis.  At that hospitalization she was denied CABG by CTS given her co morbidities.  Was going to have a LHC with high risk PCI however started to have abdominal pain and found to have large rectus sheath hematoma (CT non contrast diagnosis given her ISSA).  She went to IR and performed embolization of inferior epigastric and collateral arteries.  Moreover she had an ISSA and decision made to defer IC intervention during this time.  Was discharged to SNF with future appointment on 3/17 for high risk PCI consideration    She presented back to the hospital on 3/15 with chest pain.  EKG stable.  Trops minimally elevated to 0.014 and 0.015.  Cr is 1.5, hgb 8.3.  She is allergic to contrast.  IC consulted for consideration of high risk PCI this admission       Past Medical History:   Diagnosis Date    CAD (coronary artery  disease)     Cancer     colon    CHF (congestive heart failure)     Colitis     Colon cancer     COPD (chronic obstructive pulmonary disease)     Decreased hearing     Diabetes mellitus     Diabetes mellitus, type 2     Hypercholesterolemia     Hypertension     Hypoxemia     Insomnia     Obesity     Osteoarthritis        Past Surgical History:   Procedure Laterality Date    ANGIOGRAM, CORONARY, WITH LEFT HEART CATHETERIZATION N/A 2/10/2022    Procedure: Angiogram, Coronary, with Left Heart Cath;  Surgeon: Cristian Benjamin MD;  Location: [facility] CATH/EP LAB;  Service: Cardiology;  Laterality: N/A;    CARDIAC CATHETERIZATION      CHOLECYSTECTOMY      COLECTOMY      CORONARY ANGIOPLASTY      CORONARY STENT PLACEMENT      EXTERNAL EAR SURGERY      EYE SURGERY      FLEXIBLE SIGMOIDOSCOPY N/A 9/19/2019    Procedure: SIGMOIDOSCOPY, FLEXIBLE;  Surgeon: Biju Kramer III, MD;  Location: [facility] ENDO;  Service: Endoscopy;  Laterality: N/A;    HYSTERECTOMY      partial       Review of patient's allergies indicates:   Allergen Reactions    Iodinated contrast media     Strawberries [strawberry] Hives and Itching       PTA Medications   Medication Sig    amLODIPine (NORVASC) 10 MG tablet Take 1 tablet (10 mg total) by mouth once daily.    atorvastatin (LIPITOR) 80 MG tablet Take 1 tablet (80 mg total) by mouth every evening.    furosemide (LASIX) 20 MG tablet Take 2 tablets (40 mg total) by mouth once daily.    lisinopriL 10 MG tablet Take 1 tablet (10 mg total) by mouth once daily.    metoprolol succinate (TOPROL-XL) 50 MG 24 hr tablet Take 1 tablet (50 mg total) by mouth once daily.    pantoprazole (PROTONIX) 40 MG tablet Take 1 tablet (40 mg total) by mouth once daily.    acetaminophen (TYLENOL) 325 MG tablet Take 2 tablets (650 mg total) by mouth every 4 (four) hours as needed.    albuterol (VENTOLIN HFA) 90 mcg/actuation inhaler Inhale 2 puffs into the lungs every 6 (six) hours as needed  for Wheezing or Shortness of Breath. Rescue    aspirin (ECOTRIN) 81 MG EC tablet Take 81 mg by mouth every morning.     bromfenac (PROLENSA) 0.07 % Drop Place 1 drop into both eyes daily as needed.    coenzyme Q10 100 mg capsule Take 100 mg by mouth every morning.    dextran 70-hypromellose 0.1-0.3 % Drop Place 1 drop into both eyes daily as needed.    ergocalciferol (VITAMIN D2) 50,000 unit Cap Take 1 capsule (50,000 Units total) by mouth every 7 days.    fish oil-omega-3 fatty acids 300-1,000 mg capsule Take 1 capsule by mouth every morning.    FLAXSEED OIL ORAL Take 1,200 mg by mouth every morning.    fluticasone-salmeterol diskus inhaler 250-50 mcg Inhale 1 puff into the lungs 2 (two) times daily.    ipratropium-albuteroL (COMBIVENT RESPIMAT)  mcg/actuation inhaler Inhale 2 puffs into the lungs every 4 (four) hours as needed for Shortness of Breath. Rescue    NARCAN 4 mg/actuation Spry     nitroGLYCERIN 0.2 mg/hr TD PT24 (NITRODUR) 0.2 mg/hr APPLY 1 PATCH TOPICALLY ONCE DAILY TO LEG.    nitroGLYCERIN 0.4 MG/DOSE TL SPRY (NITROLINGUAL) 400 mcg/spray spray USE ONE SPRAY UNDER THE TONGUE EVERY 5 MINUTES AS NEEDED FOR CHEST PAIN.  DO NOT EXCEED A TOTAL OF 3 SPRAYS IN 15 MINUTES    ondansetron (ZOFRAN-ODT) 4 MG TbDL Take 2 tablets (8 mg total) by mouth every 6 (six) hours as needed.    PNV NO.115/IRON FUMARATE/FA (PRENATAL #115-IRON-FOLIC ACID ORAL) Take 1 tablet by mouth every morning.     temazepam (RESTORIL) 15 mg Cap Take 1 capsule (15 mg total) by mouth every evening.    ticagrelor (BRILINTA) 90 mg tablet Take 1 tablet (90 mg total) by mouth 2 (two) times a day.    triamcinolone acetonide 0.1% (KENALOG) 0.1 % cream Apply topically 2 (two) times daily. For legs.    umeclidinium (INCRUSE ELLIPTA) 62.5 mcg/actuation inhalation capsule Inhale 1 capsule (63 mcg total) into the lungs every morning. Controller    vit C/E/Zn/coppr/lutein/zeaxan (PRESERVISION AREDS-2 ORAL) Take 1 capsule by mouth  every morning.     Family History       Problem Relation (Age of Onset)    Febrile seizures Mother    Heart failure Father          Tobacco Use    Smoking status: Former Smoker     Packs/day: 3.00     Years: 50.00     Pack years: 150.00     Types: Cigarettes     Start date: 3/30/1964     Quit date: 2006     Years since quittin.0    Smokeless tobacco: Never Used    Tobacco comment: ex smoker 15 years   Substance and Sexual Activity    Alcohol use: Yes    Drug use: No    Sexual activity: Never     Review of Systems   Constitutional: Negative for fever and malaise/fatigue.   HENT:  Negative for congestion and sore throat.    Eyes:  Negative for blurred vision, vision loss in left eye and vision loss in right eye.   Cardiovascular:  Positive for chest pain and dyspnea on exertion. Negative for irregular heartbeat, leg swelling, near-syncope, palpitations and syncope.   Respiratory:  Positive for shortness of breath. Negative for wheezing.    Endocrine: Negative.    Hematologic/Lymphatic: Negative.    Skin: Negative.    Musculoskeletal:  Negative for arthritis, back pain, joint pain and myalgias.   Gastrointestinal:  Negative for abdominal pain, hematemesis, hematochezia and melena.   Genitourinary: Negative.    Neurological:  Negative for light-headedness and loss of balance.   Psychiatric/Behavioral: Negative.     Objective:     Vital Signs (Most Recent):  Temp: 98.1 °F (36.7 °C) (22)  Pulse: (!) 58 (22)  Resp: 18 (22)  BP: 132/63 (22)  SpO2: 97 % (22)   Vital Signs (24h Range):  Temp:  [97.8 °F (36.6 °C)-98.4 °F (36.9 °C)] 98.1 °F (36.7 °C)  Pulse:  [51-67] 58  Resp:  [16-20] 18  SpO2:  [95 %-100 %] 97 %  BP: (107-156)/(50-71) 132/63     Weight: 87.2 kg (192 lb 3.9 oz)  Body mass index is 34.05 kg/m².    SpO2: 97 %  O2 Device (Oxygen Therapy): nasal cannula      Intake/Output Summary (Last 24 hours) at 3/17/2022 0925  Last data filed at 3/17/2022  0828  Gross per 24 hour   Intake 25 ml   Output 2600 ml   Net -2575 ml       Lines/Drains/Airways       Drain  Duration             Female External Urinary Catheter 02/22/22 1900 22 days              Peripheral Intravenous Line  Duration                  Peripheral IV - Single Lumen 03/16/22 0020 20 G Left;Posterior Hand 1 day                    Physical Exam  Vitals and nursing note reviewed.   Constitutional:       Appearance: Normal appearance. She is obese.   HENT:      Mouth/Throat:      Mouth: Mucous membranes are moist.   Eyes:      General: No scleral icterus.     Extraocular Movements: Extraocular movements intact.   Neck:      Vascular: No carotid bruit.   Cardiovascular:      Rate and Rhythm: Normal rate and regular rhythm.      Pulses: Normal pulses.      Heart sounds: Normal heart sounds. No murmur heard.    No gallop.   Pulmonary:      Effort: Pulmonary effort is normal. No respiratory distress.      Breath sounds: Normal breath sounds. No wheezing.   Abdominal:      General: Abdomen is flat. Bowel sounds are normal.      Palpations: Abdomen is soft.      Comments: Small solid abdominal mass where rectus sheath hematoma was located    Musculoskeletal:      Cervical back: Normal range of motion.      Right lower leg: No edema.      Left lower leg: No edema.   Skin:     General: Skin is warm and dry.      Capillary Refill: Capillary refill takes less than 2 seconds.   Neurological:      General: No focal deficit present.      Mental Status: She is alert and oriented to person, place, and time. Mental status is at baseline.       Significant Labs: All pertinent lab results from the last 24 hours have been reviewed.    Significant Imaging: Echocardiogram: Transthoracic echo (TTE) complete (Cupid Only):   Results for orders placed or performed during the hospital encounter of 02/11/22   Echo   Result Value Ref Range    Ascending aorta 2.24 cm    STJ 2.11 cm    AV mean gradient 8 mmHg    Ao peak tiki 1.80 m/s     Ao VTI 36.43 cm    IVRT 91.34 msec    IVS 0.86 0.6 - 1.1 cm    LA size 4.33 cm    Left Atrium Major Axis 5.64 cm    Left Atrium Minor Axis 5.64 cm    LVIDd 4.74 3.5 - 6.0 cm    LVIDs 2.64 2.1 - 4.0 cm    LVOT diameter 2.00 cm    LVOT peak VTI 27.39 cm    Posterior Wall 0.89 0.6 - 1.1 cm    MV Peak A Asim 1.03 m/s    E wave deceleration time 194.92 msec    MV Peak E Asim 0.94 m/s    RA Major Axis 4.52 cm    RA Width 3.45 cm    RVDD 2.85 cm    Sinus 2.57 cm    TAPSE 3.70 cm    TDI LATERAL 0.05 m/s    TDI SEPTAL 0.05 m/s    LA WIDTH 4.64 cm    MV stenosis pressure 1/2 time 56.53 ms    LV Diastolic Volume 104.66 mL    LV Systolic Volume 25.44 mL    RV S' 20.60 cm/s    LVOT peak asim 1.21 m/s    LA volume (mod) 78.65 cm3    MV mean gradient 1 mmHg    MV peak gradient 5 mmHg    MV VTI 28.95 cm    LV LATERAL E/E' RATIO 18.80 m/s    LV SEPTAL E/E' RATIO 18.80 m/s    FS 44 %    LA volume 96.32 cm3    LV mass 139.43 g    Left Ventricle Relative Wall Thickness 0.38 cm    AV valve area 2.36 cm2    AV Velocity Ratio 0.67     AV index (prosthetic) 0.75     MV valve area p 1/2 method 3.89 cm2    MV valve area by continuity eq 2.97 cm2    E/A ratio 0.91     Mean e' 0.05 m/s    LVOT area 3.1 cm2    LVOT stroke volume 86.00 cm3    AV peak gradient 13 mmHg    E/E' ratio 18.80 m/s    LV Systolic Volume Index 13.3 mL/m2    LV Diastolic Volume Index 54.51 mL/m2    LA Volume Index 50.2 mL/m2    LV Mass Index 73 g/m2    LA Volume Index (Mod) 41.0 mL/m2    BSA 1.99 m2    Right Atrial Pressure (from IVC) 3 mmHg    EF 65 %    Narrative    · The left ventricle is normal in size with normal systolic function.  · The estimated ejection fraction is 65%.  · There is a minor septal wall motion abnormality.  · Severe left atrial enlargement.  · Grade II left ventricular diastolic dysfunction.  · Normal right ventricular size with normal right ventricular systolic   function.  · Normal central venous pressure (3 mmHg).        Assessment and Plan:      Coronary artery disease, multivessel with history of previous PCI  Known high risk CAD with 60% pLAD stenosis, tandem LCx stenosis, 90% mRCA and occluded distal RCA with R to L collaterals.  Currently admitted for chest pain  - discussed with Dr. Chappell  - hold on angiography/intervention during this hospitalization  - recommend primary team to perform anemia work up (Fe labs, retic etc)  - maximize GDMT as tolerated (start with metoprolol succ first and then increase ISMN as BP tolerates)  - ok to eat today  - will need to f/u with Dr. Chappell in 2-4 weeks in interventional clinic once discharged        VTE Risk Mitigation (From admission, onward)         Ordered     IP VTE HIGH RISK PATIENT  Once         03/16/22 0202     Place sequential compression device  Until discontinued         03/16/22 0202                Thank you for your consult. I will sign off. Please contact us if you have any additional questions.    Miles Butcher MD  Interventional Cardiology   [facility] - Telemetry Stepdown ([place]-[facility])

## 2022-03-17 NOTE — PLAN OF CARE
Problem: Adult Inpatient Plan of Care  Goal: Plan of Care Review  Outcome: Ongoing, Progressing  Goal: Patient-Specific Goal (Individualized)  Outcome: Ongoing, Progressing  Goal: Absence of Hospital-Acquired Illness or Injury  Outcome: Ongoing, Progressing  Goal: Optimal Comfort and Wellbeing  Outcome: Ongoing, Progressing  Goal: Readiness for Transition of Care  Outcome: Ongoing, Progressing     Problem: Bariatric Environmental Safety  Goal: Safety Maintained with Care  Outcome: Ongoing, Progressing     Problem: Infection  Goal: Absence of Infection Signs and Symptoms  Outcome: Ongoing, Progressing     Problem: Diabetes Comorbidity  Goal: Blood Glucose Level Within Targeted Range  Outcome: Ongoing, Progressing     Problem: Impaired Wound Healing  Goal: Optimal Wound Healing  Outcome: Ongoing, Progressing     Pt lying in bed on right side at present. Requires assistance x1. Wrinkles noted to BLE , ankles, foot. Cont redness to BLE. Purewick intact with 300cc clear yellow noted in container. Denies chest pain/discomfort when asked. Oxygen at 2 L/min via nc intact. Pt anny well. Tele monitor, lead intact with box 1810 operating properly. Glasses on at present. [place]. Pt states she is deaf in left ear, hearing aid intact to right ear. Call light w/in reach. Will cont to monitor.

## 2022-03-17 NOTE — ASSESSMENT & PLAN NOTE
Known high risk CAD with 60% pLAD stenosis, tandem LCx stenosis, 90% mRCA and occluded distal RCA with R to L collaterals.  Currently admitted for chest pain  - she is allergic to contrast: will need to be adequately prepped  - ok to eat today  - continue current medical therapy  - will discuss with Dr. Chappell today

## 2022-03-18 ENCOUNTER — HOSPITAL ENCOUNTER (INPATIENT)
Facility: HOSPITAL | Age: 77
LOS: 14 days | Discharge: HOME HEALTH | DRG: 280 | End: 2022-04-01
Attending: HOSPITALIST | Admitting: HOSPITALIST
Payer: MEDICARE

## 2022-03-18 VITALS
OXYGEN SATURATION: 98 % | RESPIRATION RATE: 18 BRPM | DIASTOLIC BLOOD PRESSURE: 63 MMHG | HEIGHT: 63 IN | HEART RATE: 77 BPM | TEMPERATURE: 98 F | SYSTOLIC BLOOD PRESSURE: 134 MMHG | BODY MASS INDEX: 34.06 KG/M2 | WEIGHT: 192.25 LBS

## 2022-03-18 DIAGNOSIS — I10 ESSENTIAL HYPERTENSION, BENIGN: ICD-10-CM

## 2022-03-18 DIAGNOSIS — J96.11 CHRONIC HYPOXEMIC RESPIRATORY FAILURE: ICD-10-CM

## 2022-03-18 DIAGNOSIS — I50.32 CHRONIC DIASTOLIC HEART FAILURE: ICD-10-CM

## 2022-03-18 DIAGNOSIS — I21.4 NSTEMI (NON-ST ELEVATED MYOCARDIAL INFARCTION): Primary | ICD-10-CM

## 2022-03-18 LAB
ANION GAP SERPL CALC-SCNC: 9 MMOL/L (ref 8–16)
BASOPHILS # BLD AUTO: 0.02 K/UL (ref 0–0.2)
BASOPHILS NFR BLD: 0.4 % (ref 0–1.9)
BUN SERPL-MCNC: 62 MG/DL (ref 8–23)
CALCIUM SERPL-MCNC: 8.9 MG/DL (ref 8.7–10.5)
CHLORIDE SERPL-SCNC: 108 MMOL/L (ref 95–110)
CO2 SERPL-SCNC: 25 MMOL/L (ref 23–29)
CREAT SERPL-MCNC: 1.6 MG/DL (ref 0.5–1.4)
DIFFERENTIAL METHOD: ABNORMAL
EOSINOPHIL # BLD AUTO: 0.1 K/UL (ref 0–0.5)
EOSINOPHIL NFR BLD: 2.5 % (ref 0–8)
ERYTHROCYTE [DISTWIDTH] IN BLOOD BY AUTOMATED COUNT: 14.8 % (ref 11.5–14.5)
EST. GFR  (AFRICAN AMERICAN): 36.3 ML/MIN/1.73 M^2
EST. GFR  (NON AFRICAN AMERICAN): 31.5 ML/MIN/1.73 M^2
GLUCOSE SERPL-MCNC: 82 MG/DL (ref 70–110)
HCT VFR BLD AUTO: 30 % (ref 37–48.5)
HGB BLD-MCNC: 9.1 G/DL (ref 12–16)
IMM GRANULOCYTES # BLD AUTO: 0.02 K/UL (ref 0–0.04)
IMM GRANULOCYTES NFR BLD AUTO: 0.4 % (ref 0–0.5)
LYMPHOCYTES # BLD AUTO: 1.1 K/UL (ref 1–4.8)
LYMPHOCYTES NFR BLD: 19.2 % (ref 18–48)
MAGNESIUM SERPL-MCNC: 1.7 MG/DL (ref 1.6–2.6)
MCH RBC QN AUTO: 28.9 PG (ref 27–31)
MCHC RBC AUTO-ENTMCNC: 30.3 G/DL (ref 32–36)
MCV RBC AUTO: 95 FL (ref 82–98)
MONOCYTES # BLD AUTO: 0.5 K/UL (ref 0.3–1)
MONOCYTES NFR BLD: 8.9 % (ref 4–15)
NEUTROPHILS # BLD AUTO: 3.8 K/UL (ref 1.8–7.7)
NEUTROPHILS NFR BLD: 68.6 % (ref 38–73)
NRBC BLD-RTO: 0 /100 WBC
PHOSPHATE SERPL-MCNC: 3.2 MG/DL (ref 2.7–4.5)
PLATELET # BLD AUTO: 138 K/UL (ref 150–450)
PMV BLD AUTO: 12.5 FL (ref 9.2–12.9)
POCT GLUCOSE: 116 MG/DL (ref 70–110)
POCT GLUCOSE: 174 MG/DL (ref 70–110)
POCT GLUCOSE: 218 MG/DL (ref 70–110)
POCT GLUCOSE: 95 MG/DL (ref 70–110)
POTASSIUM SERPL-SCNC: 3.9 MMOL/L (ref 3.5–5.1)
RBC # BLD AUTO: 3.15 M/UL (ref 4–5.4)
SODIUM SERPL-SCNC: 142 MMOL/L (ref 136–145)
WBC # BLD AUTO: 5.52 K/UL (ref 3.9–12.7)

## 2022-03-18 PROCEDURE — 94640 AIRWAY INHALATION TREATMENT: CPT

## 2022-03-18 PROCEDURE — 84100 ASSAY OF PHOSPHORUS: CPT | Performed by: PHYSICIAN ASSISTANT

## 2022-03-18 PROCEDURE — 94761 N-INVAS EAR/PLS OXIMETRY MLT: CPT

## 2022-03-18 PROCEDURE — 99900035 HC TECH TIME PER 15 MIN (STAT)

## 2022-03-18 PROCEDURE — 25000003 PHARM REV CODE 250: Performed by: HOSPITALIST

## 2022-03-18 PROCEDURE — 25000003 PHARM REV CODE 250: Performed by: PHYSICIAN ASSISTANT

## 2022-03-18 PROCEDURE — 27000221 HC OXYGEN, UP TO 24 HOURS

## 2022-03-18 PROCEDURE — 99217 PR OBSERVATION CARE DISCHARGE: ICD-10-PCS | Mod: GC,ICN,, | Performed by: PHYSICIAN ASSISTANT

## 2022-03-18 PROCEDURE — 83735 ASSAY OF MAGNESIUM: CPT | Performed by: PHYSICIAN ASSISTANT

## 2022-03-18 PROCEDURE — 36415 COLL VENOUS BLD VENIPUNCTURE: CPT | Performed by: PHYSICIAN ASSISTANT

## 2022-03-18 PROCEDURE — 85025 COMPLETE CBC W/AUTO DIFF WBC: CPT | Performed by: PHYSICIAN ASSISTANT

## 2022-03-18 PROCEDURE — 80048 BASIC METABOLIC PNL TOTAL CA: CPT | Performed by: PHYSICIAN ASSISTANT

## 2022-03-18 PROCEDURE — G0378 HOSPITAL OBSERVATION PER HR: HCPCS

## 2022-03-18 PROCEDURE — 25000242 PHARM REV CODE 250 ALT 637 W/ HCPCS: Performed by: PHYSICIAN ASSISTANT

## 2022-03-18 PROCEDURE — 99217 PR OBSERVATION CARE DISCHARGE: CPT | Mod: GC,ICN,, | Performed by: PHYSICIAN ASSISTANT

## 2022-03-18 PROCEDURE — 11000004 HC SNF PRIVATE

## 2022-03-18 RX ORDER — EPINEPHRINE 0.22MG
100 AEROSOL WITH ADAPTER (ML) INHALATION DAILY
Status: DISCONTINUED | OUTPATIENT
Start: 2022-03-19 | End: 2022-04-01 | Stop reason: HOSPADM

## 2022-03-18 RX ORDER — ALBUTEROL SULFATE 90 UG/1
2 AEROSOL, METERED RESPIRATORY (INHALATION) EVERY 6 HOURS PRN
Status: DISCONTINUED | OUTPATIENT
Start: 2022-03-18 | End: 2022-04-01 | Stop reason: HOSPADM

## 2022-03-18 RX ORDER — TALC
6 POWDER (GRAM) TOPICAL NIGHTLY PRN
Status: DISCONTINUED | OUTPATIENT
Start: 2022-03-18 | End: 2022-04-01 | Stop reason: HOSPADM

## 2022-03-18 RX ORDER — LISINOPRIL 10 MG/1
10 TABLET ORAL DAILY
Status: DISCONTINUED | OUTPATIENT
Start: 2022-03-19 | End: 2022-03-20

## 2022-03-18 RX ORDER — ACETAMINOPHEN 325 MG/1
650 TABLET ORAL EVERY 4 HOURS PRN
Status: DISCONTINUED | OUTPATIENT
Start: 2022-03-18 | End: 2022-04-01 | Stop reason: HOSPADM

## 2022-03-18 RX ORDER — FUROSEMIDE 40 MG/1
40 TABLET ORAL DAILY
Status: DISCONTINUED | OUTPATIENT
Start: 2022-03-19 | End: 2022-04-01 | Stop reason: HOSPADM

## 2022-03-18 RX ORDER — METOPROLOL SUCCINATE 50 MG/1
50 TABLET, EXTENDED RELEASE ORAL DAILY
Status: DISCONTINUED | OUTPATIENT
Start: 2022-03-19 | End: 2022-04-01 | Stop reason: HOSPADM

## 2022-03-18 RX ORDER — NITROGLYCERIN 40 MG/1
1 PATCH TRANSDERMAL DAILY
Status: DISCONTINUED | OUTPATIENT
Start: 2022-03-19 | End: 2022-03-20

## 2022-03-18 RX ORDER — PANTOPRAZOLE SODIUM 40 MG/1
40 TABLET, DELAYED RELEASE ORAL DAILY
Status: DISCONTINUED | OUTPATIENT
Start: 2022-03-19 | End: 2022-04-01 | Stop reason: HOSPADM

## 2022-03-18 RX ORDER — ONDANSETRON 8 MG/1
8 TABLET, ORALLY DISINTEGRATING ORAL EVERY 6 HOURS PRN
Status: DISCONTINUED | OUTPATIENT
Start: 2022-03-18 | End: 2022-04-01 | Stop reason: HOSPADM

## 2022-03-18 RX ORDER — CALCIUM CARBONATE 200(500)MG
500 TABLET,CHEWABLE ORAL 2 TIMES DAILY PRN
Status: DISCONTINUED | OUTPATIENT
Start: 2022-03-18 | End: 2022-04-01 | Stop reason: HOSPADM

## 2022-03-18 RX ORDER — ACETAMINOPHEN 325 MG/1
650 TABLET ORAL EVERY 6 HOURS PRN
Status: DISCONTINUED | OUTPATIENT
Start: 2022-03-18 | End: 2022-04-01 | Stop reason: HOSPADM

## 2022-03-18 RX ORDER — TRIAMCINOLONE ACETONIDE 1 MG/G
CREAM TOPICAL 2 TIMES DAILY
Status: DISCONTINUED | OUTPATIENT
Start: 2022-03-18 | End: 2022-04-01 | Stop reason: HOSPADM

## 2022-03-18 RX ORDER — AMLODIPINE BESYLATE 10 MG/1
10 TABLET ORAL DAILY
Status: DISCONTINUED | OUTPATIENT
Start: 2022-03-19 | End: 2022-04-01 | Stop reason: HOSPADM

## 2022-03-18 RX ORDER — ERGOCALCIFEROL 1.25 MG/1
50000 CAPSULE ORAL
Status: DISCONTINUED | OUTPATIENT
Start: 2022-03-21 | End: 2022-04-01 | Stop reason: HOSPADM

## 2022-03-18 RX ORDER — ATORVASTATIN CALCIUM 20 MG/1
80 TABLET, FILM COATED ORAL NIGHTLY
Status: DISCONTINUED | OUTPATIENT
Start: 2022-03-18 | End: 2022-04-01 | Stop reason: HOSPADM

## 2022-03-18 RX ORDER — AMOXICILLIN 250 MG
1 CAPSULE ORAL 2 TIMES DAILY
Status: DISCONTINUED | OUTPATIENT
Start: 2022-03-18 | End: 2022-04-01 | Stop reason: HOSPADM

## 2022-03-18 RX ORDER — ASPIRIN 81 MG/1
81 TABLET ORAL DAILY
Status: DISCONTINUED | OUTPATIENT
Start: 2022-03-19 | End: 2022-04-01 | Stop reason: HOSPADM

## 2022-03-18 RX ADMIN — FUROSEMIDE 40 MG: 40 TABLET ORAL at 09:03

## 2022-03-18 RX ADMIN — SENNOSIDES AND DOCUSATE SODIUM 1 TABLET: 50; 8.6 TABLET ORAL at 10:03

## 2022-03-18 RX ADMIN — PANTOPRAZOLE SODIUM 40 MG: 40 TABLET, DELAYED RELEASE ORAL at 09:03

## 2022-03-18 RX ADMIN — TIOTROPIUM BROMIDE INHALATION SPRAY 2 PUFF: 3.12 SPRAY, METERED RESPIRATORY (INHALATION) at 08:03

## 2022-03-18 RX ADMIN — METOPROLOL SUCCINATE 50 MG: 50 TABLET, EXTENDED RELEASE ORAL at 09:03

## 2022-03-18 RX ADMIN — TRIAMCINOLONE ACETONIDE: 1 CREAM TOPICAL at 10:03

## 2022-03-18 RX ADMIN — ATORVASTATIN CALCIUM 80 MG: 20 TABLET, FILM COATED ORAL at 10:03

## 2022-03-18 RX ADMIN — AMLODIPINE BESYLATE 10 MG: 10 TABLET ORAL at 09:03

## 2022-03-18 RX ADMIN — TICAGRELOR 90 MG: 90 TABLET ORAL at 09:03

## 2022-03-18 RX ADMIN — MELATONIN TAB 3 MG 6 MG: 3 TAB at 10:03

## 2022-03-18 RX ADMIN — TICAGRELOR 90 MG: 90 TABLET ORAL at 10:03

## 2022-03-18 RX ADMIN — ISOSORBIDE MONONITRATE 30 MG: 30 TABLET, EXTENDED RELEASE ORAL at 09:03

## 2022-03-18 RX ADMIN — FLUTICASONE FUROATE AND VILANTEROL TRIFENATATE 1 PUFF: 100; 25 POWDER RESPIRATORY (INHALATION) at 08:03

## 2022-03-18 RX ADMIN — ASPIRIN 81 MG CHEWABLE TABLET 81 MG: 81 TABLET CHEWABLE at 09:03

## 2022-03-18 NOTE — PLAN OF CARE
CM was notified of placement. Patient was accepted to [facility] on 3/18/22. Report can be called to [phone]    .transpor

## 2022-03-18 NOTE — NURSING
Pt transported MAHESH via w/c per van transport to [facility]. Report called to Barbara.

## 2022-03-18 NOTE — NURSING
Patient complained of general body aches at shift change, requested pain medication with evening medication. (Vitals were stable) At medication pass Patient complained of sudden SOB, increased feeling of weakness, malaise, and chest tightness. Stated that she felt like she had the chills and a chest cold. Had had uncontrollable loose stools due to medication given to lower k levels. ( Vitals were still level)  MD notified. Patient sent to ED for assessment and higher level of care.   Patient left unit at 2211.  Patient took cell phone, glasses, hearing aid,  and batteries for hearing aid. Daughter was notified.

## 2022-03-18 NOTE — PT/OT/SLP PROGRESS
Physical Therapy      Patient Name:  Marisol Kerr   MRN:  [id number]    Patient not seen today secondary to adamant refusal despite max encouragement from therapist. Pt states that she is experiencing a burning sensation in her heels and calves. Will follow-up next day.

## 2022-03-18 NOTE — PLAN OF CARE
CM was notified of placement. Patient was accepted to [facility] on 3/18/22 Report can be called to  [phone].     SW arranged  wheelchair  transport via [facility]. Requested  time is  1530 .  Requested  time does not guarantee arrival time.  If transport does not arrive by 1630  please contact assigned SW or on-call for assistance.    Patient's bedside nurse and  OC notified of the above.     03/18/22 1437   Post-Acute Status   Post-Acute Authorization Placement   Post-Acute Placement Status Set-up Complete/Auth obtained   Discharge Plan   Discharge Plan A Skilled Nursing Facility   Discharge Plan B Skilled Nursing Facility       Future Appointments   Date Time Provider Department Center   4/7/2022  1:40 PM Scott Chappell MD [facility]   4/27/2022 11:20 AM Antonieta Marquez NP [facility]     Shyam Salinas, RN,BSN

## 2022-03-18 NOTE — PT/OT/SLP PROGRESS
Occupational Therapy      Patient Name:  Marisol Kerr   MRN:  [id number]    Patient not seen today secondary to patient declined OT session today secondary to burning in feet and potential move back to SNF. Will follow-up per plan of care.    3/18/2022

## 2022-03-18 NOTE — PLAN OF CARE
NURSING HOME ORDERS    03/18/2022  [facility]  [facility] - TELEMETRY STEPDOWN ([facility])  [address]  [facility] [address]  Dept: [phone]  Loc: [phone]     Admit to Nursing Home:      Diagnoses:  Active Hospital Problems    Diagnosis  POA    *Chest pain [R07.9]  Yes    Hyperkalemia [E87.5]  Yes    Chronic diastolic congestive heart failure [I50.32]  Yes    COPD/emphysema [J44.9]  Yes     Chronic    Chronic hypoxemic respiratory failure [J96.11]  Yes     3 L at home      CKD (chronic kidney disease), stage III [N18.30]  Yes    DM type 2, controlled, with complication [E11.8]  Yes    Coronary artery disease, multivessel with history of previous PCI [I25.10]  Yes    Gastroesophageal reflux disease without esophagitis [K21.9]  Yes    Hypercholesterolemia [E78.00]  Yes    Essential hypertension, benign [I10]  Yes      Resolved Hospital Problems   No resolved problems to display.       Code Status: Full    Patient is homebound due to:  Chest pain    Allergies:  Review of patient's allergies indicates:   Allergen Reactions    Iodinated contrast media     Strawberries [strawberry] Hives and Itching       Vitals:  Every shift    Diet: cardiac diet    Activities:   Activity as tolerated    Labs:  Per facility protocol    Nursing Precautions:  Fall    Consults:   PT to evaluate and treat- 3 times a week and OT to evaluate and treat- 3 times a week            Diabetes Care:  Report CBG < 60 or > 350 to physician.      Medications: Discontinue all previous medication orders, if any. See new list below.     Medication List      CONTINUE taking these medications    acetaminophen 325 MG tablet  Commonly known as: TYLENOL  Take 2 tablets (650 mg total) by mouth every 4 (four) hours as needed.     albuterol 90 mcg/actuation inhaler  Commonly known as: VENTOLIN HFA  Inhale 2 puffs into the lungs every 6 (six) hours as needed for Wheezing or Shortness of Breath. Rescue      amLODIPine 10 MG tablet  Commonly known as: NORVASC  Take 1 tablet (10 mg total) by mouth once daily.     aspirin 81 MG EC tablet  Commonly known as: ECOTRIN  Take 81 mg by mouth every morning.     atorvastatin 80 MG tablet  Commonly known as: LIPITOR  Take 1 tablet (80 mg total) by mouth every evening.     coenzyme Q10 100 mg capsule  Take 100 mg by mouth every morning.     ergocalciferol 50,000 unit Cap  Commonly known as: VITAMIN D2  Take 1 capsule (50,000 Units total) by mouth every 7 days.     fish oil-omega-3 fatty acids 300-1,000 mg capsule  Take 1 capsule by mouth every morning.     furosemide 20 MG tablet  Commonly known as: LASIX  Take 2 tablets (40 mg total) by mouth once daily.     lisinopriL 10 MG tablet  Take 1 tablet (10 mg total) by mouth once daily.     metoprolol succinate 50 MG 24 hr tablet  Commonly known as: TOPROL-XL  Take 1 tablet (50 mg total) by mouth once daily.     NARCAN 4 mg/actuation Spry  Generic drug: naloxone     * nitroGLYCERIN 0.2 mg/hr TD PT24 0.2 mg/hr  Commonly known as: NITRODUR  APPLY 1 PATCH TOPICALLY ONCE DAILY TO LEG.     * nitroGLYCERIN 0.4 MG/DOSE TL SPRY 400 mcg/spray spray  Commonly known as: NITROLINGUAL  USE ONE SPRAY UNDER THE TONGUE EVERY 5 MINUTES AS NEEDED FOR CHEST PAIN.  DO NOT EXCEED A TOTAL OF 3 SPRAYS IN 15 MINUTES     ondansetron 4 MG Tbdl  Commonly known as: ZOFRAN-ODT  Take 2 tablets (8 mg total) by mouth every 6 (six) hours as needed.     pantoprazole 40 MG tablet  Commonly known as: PROTONIX  Take 1 tablet (40 mg total) by mouth once daily.     ticagrelor 90 mg tablet  Commonly known as: BRILINTA  Take 1 tablet (90 mg total) by mouth 2 (two) times a day.     triamcinolone acetonide 0.1% 0.1 % cream  Commonly known as: KENALOG  Apply topically 2 (two) times daily. For legs.         * This list has 2 medication(s) that are the same as other medications prescribed for you. Read the directions carefully, and ask your doctor or other care provider to review  them with you.            STOP taking these medications    benazepriL 40 MG tablet  Commonly known as: LOTENSIN     bromfenac 0.07 % Drop  Commonly known as: PROLENSA     CombiVENT RESPIMAT  mcg/actuation inhaler  Generic drug: ipratropium-albuteroL     dextran 70-hypromellose 0.1-0.3 % Drop     FLAXSEED OIL ORAL     fluticasone-salmeterol 250-50 mcg/dose 250-50 mcg/dose diskus inhaler  Commonly known as: ADVAIR DISKUS     INCRUSE ELLIPTA 62.5 mcg/actuation inhalation capsule  Generic drug: umeclidinium     lovastatin 40 MG tablet  Commonly known as: MEVACOR     PRENATAL #115-IRON-FOLIC ACID ORAL     PRESERVISION AREDS-2 ORAL     temazepam 15 mg Cap  Commonly known as: RESTORIL              Immunizations Administered as of 3/18/2022     Name Date Dose VIS Date Route Exp Date    COVID-19, MRNA, LN-S, PF (Moderna) 10/28/2021 -- -- Intramuscular --    Site: Left arm     Lot: [id number]     COVID-19, MRNA, LN-S, PF (Moderna) 2/10/2021 -- -- Intramuscular --    Site: Left arm     Lot: [id number]     COVID-19, MRNA, LN-S, PF (Moderna) 1/13/2021 -- -- Intramuscular --    Site: Left arm     Lot: [id number]           This patient has had both covid vaccinations    Some patients may experience side effects after vaccination.  These may include fever, headache, muscle or joint aches.  Most symptoms resolve with 24-48 hours and do not require urgent medical evaluation unless they persist for more than 72 hours or symptoms are concerning for an unrelated medical condition.          _________________________________  Nighat Mcdonald PA-C  03/18/2022

## 2022-03-18 NOTE — PLAN OF CARE
Patient alert x 4,  sleeping without distress,  Titrated oxygen to 1L/min  Without  SOB and maintained above 90% saturation     Problem: Diabetes Comorbidity  Goal: Blood Glucose Level Within Targeted Range  Outcome: Ongoing, Progressing     Problem: Impaired Wound Healing  Goal: Optimal Wound Healing  Outcome: Ongoing, Progressing

## 2022-03-19 LAB
POCT GLUCOSE: 112 MG/DL (ref 70–110)
POCT GLUCOSE: 112 MG/DL (ref 70–110)
POCT GLUCOSE: 122 MG/DL (ref 70–110)
POCT GLUCOSE: 125 MG/DL (ref 70–110)

## 2022-03-19 PROCEDURE — 99900035 HC TECH TIME PER 15 MIN (STAT)

## 2022-03-19 PROCEDURE — 27000221 HC OXYGEN, UP TO 24 HOURS

## 2022-03-19 PROCEDURE — 11000004 HC SNF PRIVATE

## 2022-03-19 PROCEDURE — 97161 PT EVAL LOW COMPLEX 20 MIN: CPT

## 2022-03-19 PROCEDURE — 97535 SELF CARE MNGMENT TRAINING: CPT

## 2022-03-19 PROCEDURE — 97165 OT EVAL LOW COMPLEX 30 MIN: CPT

## 2022-03-19 PROCEDURE — 97530 THERAPEUTIC ACTIVITIES: CPT

## 2022-03-19 PROCEDURE — 94761 N-INVAS EAR/PLS OXIMETRY MLT: CPT

## 2022-03-19 PROCEDURE — 97116 GAIT TRAINING THERAPY: CPT

## 2022-03-19 PROCEDURE — 25000003 PHARM REV CODE 250: Performed by: HOSPITALIST

## 2022-03-19 PROCEDURE — 97110 THERAPEUTIC EXERCISES: CPT

## 2022-03-19 RX ADMIN — MELATONIN TAB 3 MG 6 MG: 3 TAB at 09:03

## 2022-03-19 RX ADMIN — ATORVASTATIN CALCIUM 80 MG: 20 TABLET, FILM COATED ORAL at 09:03

## 2022-03-19 RX ADMIN — Medication 100 MG: at 09:03

## 2022-03-19 RX ADMIN — TICAGRELOR 90 MG: 90 TABLET ORAL at 09:03

## 2022-03-19 RX ADMIN — NITROGLYCERIN 1 PATCH: 0.2 PATCH TRANSDERMAL at 09:03

## 2022-03-19 RX ADMIN — TRIAMCINOLONE ACETONIDE: 1 CREAM TOPICAL at 09:03

## 2022-03-19 RX ADMIN — LISINOPRIL 10 MG: 10 TABLET ORAL at 09:03

## 2022-03-19 RX ADMIN — METOPROLOL SUCCINATE 50 MG: 50 TABLET, EXTENDED RELEASE ORAL at 09:03

## 2022-03-19 RX ADMIN — Medication 1 CAPSULE: at 09:03

## 2022-03-19 RX ADMIN — SENNOSIDES AND DOCUSATE SODIUM 1 TABLET: 50; 8.6 TABLET ORAL at 09:03

## 2022-03-19 RX ADMIN — PANTOPRAZOLE SODIUM 40 MG: 40 TABLET, DELAYED RELEASE ORAL at 09:03

## 2022-03-19 RX ADMIN — FUROSEMIDE 40 MG: 40 TABLET ORAL at 09:03

## 2022-03-19 RX ADMIN — AMLODIPINE BESYLATE 10 MG: 10 TABLET ORAL at 09:03

## 2022-03-19 RX ADMIN — ASPIRIN 81 MG: 81 TABLET, COATED ORAL at 09:03

## 2022-03-19 NOTE — PLAN OF CARE
Pt arrived to floor for skilled services via wheel chair. Pt required 1 person assistance from wheelchair to bed. Pt is AAOX4, continent of B/B, pt reported nighttime incontinence. Pt is on a diabetic diet. Skin assessment done: Pt has bruised upper arms, redness under bilateral breast, redness to vaginal folds, scratch to right labia; redness to bilateral lower legs, stage 2 to sacrum, open to air. Daughter made aware of patients arrival.   POC reviewed with patient. Pt denies pain at this time and is educated to use call light and not get OOB w/o assistance.

## 2022-03-19 NOTE — PLAN OF CARE
Problem: Occupational Therapy Goal  Goal: Occupational Therapy Goal  Description: Goals to be met by: 04/01/22     Patient will increase functional independence with ADLs by performing:    UE Dressing with Modified [place].  LE Dressing with Modified [place] using AD.  Grooming while standing at sink with Modified [place].  Toileting from bedside commode with Modified [place] for hygiene and clothing management.   Bathing from  shower chair/bench with Minimal Assistance.  Toilet transfer to bedside commode with Modified [place].  Upper extremity exercise program 3x15 reps per handout, with supervision to increase UE strength/endurance for improved func mobility skills  Stand during functional task for 10' with S in preparation for increased independence during standing ADLs..    Outcome: Ongoing, Progressing     Pt was agreeable to and participated in OT evaluation.  Pt reports that she lives alone and was mod I with ADls and mobility using DME.  Pt is currently performing func mobility with set-up - CGA and ADLs from set-up to mod A. Goals established to assist pt with returning to OF regarding ADLs and func mobility.  Pt will benefit from skilled OT services in order to increase her level of safety and independence with ADLs and mobility.      Marisol Mantilla, OT  3/19/2022

## 2022-03-19 NOTE — PT/OT/SLP EVAL
"Occupational Therapy   Evaluation & Tx    Name: Marisol Kerr  MRN: [id number]  Admit Date: 3/18/2022  Admitting Diagnosis:  <principal problem not specified>   Recent Surgeries: no sx found    General Precautions: Standard, fall   Orthopedic Precautions:N/A   Braces: N/A     Recommendations:     Discharge Recommendations: home with home health  Level of Assistance Recommended: Intermittent supervision  Discharge Equipment Recommendations:  none  Barriers to discharge:  Decreased caregiver support    Assessment:     Marisol Kerr is a 76 y.o. female with a medical diagnosis of <principal problem not specified>.  She presents with the following performance deficits affecting function: weakness, impaired endurance, impaired sensation, impaired self care skills, impaired functional mobilty, gait instability, impaired balance, decreased coordination, decreased upper extremity function, decreased lower extremity function, pain, decreased safety awareness, decreased ROM, impaired skin, impaired cardiopulmonary response to activity.  Pt was agreeable to and participated in OT evaluation.  Pt reports that she lives alone and was mod I with ADls and mobility using DME.  Pt is currently performing func mobility with set-up - CGA and ADLs from set-up to mod A. Goals established to assist pt with returning to OF regarding ADLs and func mobility.  Pt will benefit from skilled OT services in order to increase her level of safety and independence with ADLs and mobility.      Rehab Potential is good    Activity Tolerance: Good    Plan:     Patient to be seen 6 x/week to address the above listed problems via self-care/home management, therapeutic activities, therapeutic exercises  · Plan of Care Expires: 04/18/22  · Plan of Care Reviewed with: patient    Subjective     Chief Complaint: pain in L shoulder due to arthritis - declined pain medication when reported to nurse  Patient/Family Comments/goals: "go back home"    Occupational " Profile:  Living Environment: pt lives alone in [facility] with THE entrance - has SIS with ~2-3 step to enter with GBs - uses BSC over toilet  Previous level of function: mod I using DME for ADLs and mobility - able to do light chores at home - doesn't drive - daughter assist with cleaning home and cooking  Roles and Routines: mother, grandmother  Equipment Used at Home:  cane, straight, bedside commode, hip kit, shower chair, rollator, wheelchair  Assistance upon Discharge: some assist from daughter    Pain/Comfort:  Pain Rating 1: 8/10  Location - Side 1: Left  Location 1: shoulder (states its arthritic pain)  Pain Addressed 1: Reposition, Distraction, Nurse notified  Pain Rating Post-Intervention 1: 8/10    Patients cultural, spiritual, Roman Catholic conflicts given the current situation: no    Objective:     Communicated with: nurse prior to session.  Patient found HOB elevated with oxygen upon OT entry to room    Occupational Performance:      03/19/22 0900   Eating   Assistance Needed Independent   CARE Score - Eating 6   Oral Hygiene   Assistance Needed Set-up A at sink in w/c - also completed hand washing, washing face and combing hair   CARE Score - Oral Hygiene 5   Toileting Hygiene   Assistance Needed CGA when standing for hygiene and clothing mgmt - able to complete all steps   CARE Score - Toileting Hygiene 4   Shower/Bathe Self   Assistance Needed Min A - sponge bath at w/c level at sink - needed A to wash B lower legs   Physical Assistance Level 25% or less   CARE Score - Shower/Bathe Self 3   Upper Body Dressing   Assistance Needed Set-up A to beth/doff pull over style shirt while seated   CARE Score - Upper Body Dressing 5   Lower Body Dressing   Assistance Needed Min A to insert Feet into pants and CGA to pull to waist   Physical Assistance Level 25% or less   CARE Score - Lower Body Dressing 3   Putting On/Taking Off Footwear   Assistance Needed Max A to beth/doff socks - uses sock aide and reacher at home    Physical Assistance Level 76% or more   CARE Score - Putting On/Taking Off Footwear 2   Roll Left and Right   Assistance Needed Supervision   CARE Score - Roll Left and Right 4   Lying to Sitting on Side of Bed   Assistance Needed Supervision   CARE Score - Lying to Sitting on Side of Bed 4   Sit to Stand   Assistance Needed Incidental touching - CGA   CARE Score - Sit to Stand 4   Chair/Bed-to-Chair Transfer   Assistance Needed Incidental touching - CGA   CARE Score - Chair/Bed-to-Chair Transfer 4   Toilet Transfer   Assistance Needed Incidental touching - CGA   CARE Score - Toilet Transfer 4     Cognitive/Visual Perceptual:  Cognitive/Psychosocial Skills:     -       Oriented to: Person, Place, Time and Situation   -       Follows Commands/attention:Follows two-step commands  -       Communication: clear/fluent  -       Memory: No Deficits noted  -       Safety awareness/insight to disability: intact   -       Mood/Affect/Coping skills/emotional control: Appropriate to situation    Physical Exam:  Balance: -       Sit = good; Stand: Static = SBA with RW; Dynamic = CGA with RW  Postural examination/scapula alignment:    -       Rounded shoulders  -       Forward head  -       Posterior pelvic tilt  Skin integrity: decubitus on sacrum; bruising on arms from IV; redness on shins of B lower legs (Greater on L)  Edema:  Mild B feet  Sensation: -       Impaired  light touch in B feet  Dominant hand: -       right  Upper Extremity Range of Motion:     -       Right Upper Extremity: WFL  -       Left Upper Extremity: WFL (less flexion in shoulder compared to R but still WFL for ADLS)  Upper Extremity Strength:    -       Right Upper Extremity: WFL  -       Left Upper Extremity: WFL   Strength:    -       Right Upper Extremity: WFL  -       Left Upper Extremity: WFL    AMPAC 6 Click ADL:  AMPAC Total Score: 18    Treatment & Education:  · Pt completed ADLs and func mobility activities for tx session as noted  above  · Whiteboard updated  · Pt educated on role of OT and POC    Education:    Patient left up in chair with all lines intact and call button in reach    GOALS:   Multidisciplinary Problems     Occupational Therapy Goals        Problem: Occupational Therapy Goal    Goal Priority Disciplines Outcome Interventions   Occupational Therapy Goal     OT, PT/OT Ongoing, Progressing    Description: Goals to be met by: 04/01/22     Patient will increase functional independence with ADLs by performing:    UE Dressing with Modified [place].  LE Dressing with Modified [place] using AD.  Grooming while standing at sink with Modified [place].  Toileting from bedside commode with Modified [place] for hygiene and clothing management.   Bathing from  shower chair/bench with Minimal Assistance.  Toilet transfer to bedside commode with Modified [place].  Upper extremity exercise program 3x15 reps per handout, with supervision to increase UE strength/endurance for improved func mobility skills  Stand during functional task for 10' with S in preparation for increased independence during standing ADLs..                     History:     Past Medical History:   Diagnosis Date    CAD (coronary artery disease)     Cancer     colon    CHF (congestive heart failure)     Colitis     Colon cancer     COPD (chronic obstructive pulmonary disease)     Decreased hearing     Diabetes mellitus     Diabetes mellitus, type 2     Hypercholesterolemia     Hypertension     Hypoxemia     Insomnia     Obesity     Osteoarthritis          Past Surgical History:   Procedure Laterality Date    ANGIOGRAM, CORONARY, WITH LEFT HEART CATHETERIZATION N/A 2/10/2022    Procedure: Angiogram, Coronary, with Left Heart Cath;  Surgeon: Cristian Benjamin MD;  [facility]: [facility] CATH[facility]EP LAB;  Service: Cardiology;  Laterality: N/A;    CARDIAC CATHETERIZATION      CHOLECYSTECTOMY      COLECTOMY      CORONARY ANGIOPLASTY       CORONARY STENT PLACEMENT      EXTERNAL EAR SURGERY      EYE SURGERY      FLEXIBLE SIGMOIDOSCOPY N/A 9/19/2019    Procedure: SIGMOIDOSCOPY, FLEXIBLE;  Surgeon: Biju Kramer III, MD;  Location: [facility];  Service: Endoscopy;  Laterality: N/A;    HYSTERECTOMY      partial       Time Tracking:     OT Date of Treatment: 03/19/22  OT Start Time: 0740  OT Stop Time: 0842  OT Total Time (min): 62 min    Billable Minutes:Evaluation 30  Self Care/Home Management 32    3/19/2022

## 2022-03-19 NOTE — PLAN OF CARE
PT eval completed. Pt will begin PT POC.  Alia Antunez, PT  3/19/2022    Problem: Physical Therapy Goal  Goal: Physical Therapy Goal  Description: Goals to be met by: 22     Patient will increase functional independence with mobility by performin. Supine to sit with Set-up [place]  2. Sit to supine with Set-up [place]  3. Rolling to Left and Right with Supervision.  4. Sit to stand transfer with Supervision  5. Bed to chair transfer with Supervision using Rolling Walker  6. Gait  x 150 feet with Supervision using Rolling Walker.   7. Wheelchair propulsion x75 feet with Supervision using bilateral uppper extremities  8. Ascend/Descend 4 inch curb step with Stand-by Assistance using Rolling Walker.  9. Pt in standing will use a reacher to  an item from the floor w/ a RW and SPV    Outcome: Ongoing, Progressing

## 2022-03-19 NOTE — PT/OT/SLP EVAL
Physical Therapy Evaluation    Patient Name:  Marisol Kerr   MRN:  [id number]  Admit Date: 3/18/2022  Admitting Diagnosis: Chronic Diastolic Heart Failure  Recent Surgeries: none    General Precautions: Standard, fall   Orthopedic Precautions:N/A   Braces: N/A     Recommendations:     Discharge Recommendations:  home with home health   Level of Assistance Recommended: Intermittent assistance   Discharge Equipment Recommendations: walker, rolling   Barriers to discharge: Decreased caregiver support    Assessment:     Marisol Kerr is a 76 y.o. female admitted with a medical diagnosis of Chronic Diastolic Heart Failure.  Performance deficits affecting function  weakness, impaired endurance, impaired self care skills, impaired functional mobilty, gait instability, impaired balance, decreased upper extremity function, decreased lower extremity function, pain .    Pt anny session well w/ good participation. PTA she was Butch w/ mobility/ADLs but she is currently limited by the above listed deficits requiring CGA for safety w/ transfers and gait. She is appropriate for skilled PT services and will begin PT POC.    Rehab Potential is good     Activity Tolerance: Good    Plan:     Patient to be seen 5 x/week to address the above listed problems via gait training, therapeutic activities, therapeutic exercises, neuromuscular re-education, wheelchair management/training     Plan of Care Expires: 04/18/22  Plan of Care Reviewed with: patient    Subjective     Chief Complaint: weakness  Patient/Family Comments/goals: return to PLOF  Pain/Comfort:  · Pain Rating 1: 8/10  · Location - Side 1: Left  · Location - Orientation 1: generalized  · Location 1: shoulder  · Pain Addressed 1: Pre-medicate for activity, Reposition  · Pain Rating Post-Intervention 1: 8/10  · Pain Rating 2: 3/10  · Location - Side 2: Left  · Location - Orientation 2: generalized  · Location 2: hip  · Pain Addressed 2: Pre-medicate for activity, Reposition  · Pain  Rating Post-Intervention 2: 3/10    Living Environment: Pt lives alone in a 1SH w/ 1 NAYE. She has a walk-in shower w/ grab bars.     Prior to admission, patients level of function was Butch w/ mobility using either a SPC or hurry cane primarily. She also uses a rollator at times. In addition, she uses a transport chair for long community distances. Pt was Butch w/ ADLs.  Equipment used at home: cane, straight, rollator, shower chair, bedside commode (hurry cane; transport chair).  DME owned (not currently used): none.  Upon discharge, patient will have assistance from her daughter but she works full time. Her daughter was assisting her w/ groceries and transportation.    Patients cultural, spiritual, Mandaeism conflicts given the current situation: no    Objective:     Communicated with nursing prior to session.  Patient found up in chair with oxygen  upon PT entry to room.    Exams:  · Cognitive Exam:  Patient is oriented to Person, Place, Time and Situation  · Fine Motor Coordination: -       Intact  Left hand thumb/finger opposition skills and Right hand thumb/finger opposition skills  · Gross Motor Coordination:  WFL  · Postural Exam:  Patient presented with the following abnormalities:    · -       Rounded shoulders  · Sensation:    · -       Impaired  light/touch (B) posterior heels due to neuropathy  · Skin Integrity/Edema:      · -       Skin integrity: Visible skin intact  · RUE ROM: WFL except shoulder flexion AROM ~100 degrees due to OA  · RUE Strength: grossly 4/5  · LUE ROM: WFL  · LUE Strength: grossly 4/5  · RLE ROM: WFL  · RLE Strength: WFL except HF 2+/5, knee and ankle 4/5  · LLE ROM: WFL  · LLE Strength: HF 2+/5, knee and ankle 4/5    Functional Mobility:  · Bed Mobility:   · Roll left on bed w/ SBA  · Supine>sit on bed w/ CGA at trunk  · Sit>supine on bed w/ CGA for BLE  · inc'd time and cues required  · Transfers:    · Sit<>stand to/from w/c; multiple trials; all w/ RW and CGA  · Stand pivot  to/from bedside chair, w/c, and EOB; all w/ RW and CGA  · Cues for hand placement   · Gait:   · 100ft w/ RW and CGA  Cues for posture and to remain inside RW for stability  · Cues also to inc step length/height  · Balance:   · Static stand w/ RW and CGA  · Wheelchair Propulsion:  · 50ft w/ BUE and SBA  · Limited by fatigue  · Cues for technique    Therapeutic Activities and Exercises:   Seated BLE therex 2x10 reps (HF, LAQ, AP) w/ cues for form    Pt was educated on PT role and POC along w/ use of call light. Pt verb understanding.     AM-PAC 6 CLICK MOBILITY  Total Score:16     Patient left up in chair with all lines intact and call button in reach.    GOALS:   Multidisciplinary Problems     Physical Therapy Goals        Problem: Physical Therapy Goal    Goal Priority Disciplines Outcome Goal Variances Interventions   Physical Therapy Goal     PT, PT/OT Ongoing, Progressing     Description: Goals to be met by: 22     Patient will increase functional independence with mobility by performin. Supine to sit with Set-up [place]  2. Sit to supine with Set-up [place]  3. Rolling to Left and Right with Supervision.  4. Sit to stand transfer with Supervision  5. Bed to chair transfer with Supervision using Rolling Walker  6. Gait  x 150 feet with Supervision using Rolling Walker.   7. Wheelchair propulsion x75 feet with Supervision using bilateral uppper extremities  8. Ascend/Descend 4 inch curb step with Stand-by Assistance using Rolling Walker.  9. Pt in standing will use a reacher to  an item from the floor w/ a RW and SPV                     History:     Past Medical History:   Diagnosis Date    CAD (coronary artery disease)     Cancer     colon    CHF (congestive heart failure)     Colitis     Colon cancer     COPD (chronic obstructive pulmonary disease)     Decreased hearing     Diabetes mellitus     Diabetes mellitus, type 2     Hypercholesterolemia     Hypertension      Hypoxemia     Insomnia     Obesity     Osteoarthritis        Past Surgical History:   Procedure Laterality Date    ANGIOGRAM, CORONARY, WITH LEFT HEART CATHETERIZATION N/A 2/10/2022    Procedure: Angiogram, Coronary, with Left Heart Cath;  Surgeon: Cristian Benjamin MD;  Location: [facility] CATH/EP LAB;  Service: Cardiology;  Laterality: N/A;    CARDIAC CATHETERIZATION      CHOLECYSTECTOMY      COLECTOMY      CORONARY ANGIOPLASTY      CORONARY STENT PLACEMENT      EXTERNAL EAR SURGERY      EYE SURGERY      FLEXIBLE SIGMOIDOSCOPY N/A 9/19/2019    Procedure: SIGMOIDOSCOPY, FLEXIBLE;  Surgeon: Biju Kramer III, MD;  Location: [facility] ENDO;  Service: Endoscopy;  Laterality: N/A;    HYSTERECTOMY      partial       Time Tracking:     PT Received On: 03/19/22  PT Start Time: 0945     PT Stop Time: 1035  PT Total Time (min): 50 min     Billable Minutes: Evaluation 10, Gait Training 15, Therapeutic Activity 15, Therapeutic Exercise 10 and Total Time 40      03/19/2022

## 2022-03-20 PROBLEM — J96.11 CHRONIC RESPIRATORY FAILURE WITH HYPOXIA: Status: ACTIVE | Noted: 2018-04-25

## 2022-03-20 LAB
POCT GLUCOSE: 102 MG/DL (ref 70–110)
POCT GLUCOSE: 103 MG/DL (ref 70–110)
POCT GLUCOSE: 108 MG/DL (ref 70–110)

## 2022-03-20 PROCEDURE — 94761 N-INVAS EAR/PLS OXIMETRY MLT: CPT

## 2022-03-20 PROCEDURE — 27000221 HC OXYGEN, UP TO 24 HOURS

## 2022-03-20 PROCEDURE — 11000004 HC SNF PRIVATE

## 2022-03-20 PROCEDURE — 25000003 PHARM REV CODE 250: Performed by: HOSPITALIST

## 2022-03-20 PROCEDURE — 99900035 HC TECH TIME PER 15 MIN (STAT)

## 2022-03-20 RX ORDER — GABAPENTIN 100 MG/1
100 CAPSULE ORAL NIGHTLY
Status: DISCONTINUED | OUTPATIENT
Start: 2022-03-20 | End: 2022-04-01 | Stop reason: HOSPADM

## 2022-03-20 RX ORDER — ISOSORBIDE MONONITRATE 30 MG/1
30 TABLET, EXTENDED RELEASE ORAL DAILY
Status: DISCONTINUED | OUTPATIENT
Start: 2022-03-21 | End: 2022-04-01 | Stop reason: HOSPADM

## 2022-03-20 RX ADMIN — ASPIRIN 81 MG: 81 TABLET, COATED ORAL at 08:03

## 2022-03-20 RX ADMIN — TRIAMCINOLONE ACETONIDE: 1 CREAM TOPICAL at 09:03

## 2022-03-20 RX ADMIN — ATORVASTATIN CALCIUM 80 MG: 20 TABLET, FILM COATED ORAL at 08:03

## 2022-03-20 RX ADMIN — PANTOPRAZOLE SODIUM 40 MG: 40 TABLET, DELAYED RELEASE ORAL at 08:03

## 2022-03-20 RX ADMIN — LISINOPRIL 10 MG: 10 TABLET ORAL at 08:03

## 2022-03-20 RX ADMIN — NITROGLYCERIN 1 PATCH: 0.2 PATCH TRANSDERMAL at 08:03

## 2022-03-20 RX ADMIN — TICAGRELOR 90 MG: 90 TABLET ORAL at 08:03

## 2022-03-20 RX ADMIN — GABAPENTIN 100 MG: 100 CAPSULE ORAL at 08:03

## 2022-03-20 RX ADMIN — FUROSEMIDE 40 MG: 40 TABLET ORAL at 08:03

## 2022-03-20 RX ADMIN — TRIAMCINOLONE ACETONIDE: 1 CREAM TOPICAL at 08:03

## 2022-03-20 RX ADMIN — Medication 1 CAPSULE: at 08:03

## 2022-03-20 RX ADMIN — Medication 100 MG: at 08:03

## 2022-03-20 RX ADMIN — SENNOSIDES AND DOCUSATE SODIUM 1 TABLET: 50; 8.6 TABLET ORAL at 08:03

## 2022-03-20 RX ADMIN — AMLODIPINE BESYLATE 10 MG: 10 TABLET ORAL at 08:03

## 2022-03-20 RX ADMIN — METOPROLOL SUCCINATE 50 MG: 50 TABLET, EXTENDED RELEASE ORAL at 08:03

## 2022-03-21 ENCOUNTER — LAB VISIT (OUTPATIENT)
Dept: LAB | Facility: OTHER | Age: 77
End: 2022-03-21
Payer: MEDICARE

## 2022-03-21 DIAGNOSIS — Z20.822 ENCOUNTER FOR LABORATORY TESTING FOR COVID-19 VIRUS: ICD-10-CM

## 2022-03-21 LAB
ALBUMIN SERPL BCP-MCNC: 2.9 G/DL (ref 3.5–5.2)
ALP SERPL-CCNC: 82 U/L (ref 55–135)
ALT SERPL W/O P-5'-P-CCNC: 22 U/L (ref 10–44)
ANION GAP SERPL CALC-SCNC: 10 MMOL/L (ref 8–16)
AST SERPL-CCNC: 18 U/L (ref 10–40)
BASOPHILS # BLD AUTO: 0.04 K/UL (ref 0–0.2)
BASOPHILS NFR BLD: 0.7 % (ref 0–1.9)
BILIRUB DIRECT SERPL-MCNC: 0.3 MG/DL (ref 0.1–0.3)
BILIRUB SERPL-MCNC: 0.6 MG/DL (ref 0.1–1)
BUN SERPL-MCNC: 72 MG/DL (ref 8–23)
CALCIUM SERPL-MCNC: 8.9 MG/DL (ref 8.7–10.5)
CHLORIDE SERPL-SCNC: 107 MMOL/L (ref 95–110)
CO2 SERPL-SCNC: 27 MMOL/L (ref 23–29)
CREAT SERPL-MCNC: 1.9 MG/DL (ref 0.5–1.4)
DIFFERENTIAL METHOD: ABNORMAL
EOSINOPHIL # BLD AUTO: 0.2 K/UL (ref 0–0.5)
EOSINOPHIL NFR BLD: 2.6 % (ref 0–8)
ERYTHROCYTE [DISTWIDTH] IN BLOOD BY AUTOMATED COUNT: 14.3 % (ref 11.5–14.5)
EST. GFR  (AFRICAN AMERICAN): 29.5 ML/MIN/1.73 M^2
EST. GFR  (NON AFRICAN AMERICAN): 25.6 ML/MIN/1.73 M^2
GLUCOSE SERPL-MCNC: 84 MG/DL (ref 70–110)
HCT VFR BLD AUTO: 30.1 % (ref 37–48.5)
HGB BLD-MCNC: 9.2 G/DL (ref 12–16)
IMM GRANULOCYTES # BLD AUTO: 0.03 K/UL (ref 0–0.04)
IMM GRANULOCYTES NFR BLD AUTO: 0.5 % (ref 0–0.5)
LYMPHOCYTES # BLD AUTO: 1.3 K/UL (ref 1–4.8)
LYMPHOCYTES NFR BLD: 21.4 % (ref 18–48)
MAGNESIUM SERPL-MCNC: 1.5 MG/DL (ref 1.6–2.6)
MCH RBC QN AUTO: 28.8 PG (ref 27–31)
MCHC RBC AUTO-ENTMCNC: 30.6 G/DL (ref 32–36)
MCV RBC AUTO: 94 FL (ref 82–98)
MONOCYTES # BLD AUTO: 0.5 K/UL (ref 0.3–1)
MONOCYTES NFR BLD: 8.1 % (ref 4–15)
NEUTROPHILS # BLD AUTO: 4.1 K/UL (ref 1.8–7.7)
NEUTROPHILS NFR BLD: 66.7 % (ref 38–73)
NRBC BLD-RTO: 0 /100 WBC
PHOSPHATE SERPL-MCNC: 4 MG/DL (ref 2.7–4.5)
PLATELET # BLD AUTO: 155 K/UL (ref 150–450)
PMV BLD AUTO: 12.4 FL (ref 9.2–12.9)
POTASSIUM SERPL-SCNC: 3.8 MMOL/L (ref 3.5–5.1)
PROT SERPL-MCNC: 5.7 G/DL (ref 6–8.4)
RBC # BLD AUTO: 3.2 M/UL (ref 4–5.4)
SODIUM SERPL-SCNC: 144 MMOL/L (ref 136–145)
WBC # BLD AUTO: 6.08 K/UL (ref 3.9–12.7)

## 2022-03-21 PROCEDURE — 84100 ASSAY OF PHOSPHORUS: CPT | Performed by: HOSPITALIST

## 2022-03-21 PROCEDURE — 25000003 PHARM REV CODE 250: Performed by: HOSPITALIST

## 2022-03-21 PROCEDURE — 99900035 HC TECH TIME PER 15 MIN (STAT)

## 2022-03-21 PROCEDURE — 97116 GAIT TRAINING THERAPY: CPT | Mod: CQ

## 2022-03-21 PROCEDURE — 80048 BASIC METABOLIC PNL TOTAL CA: CPT | Performed by: HOSPITALIST

## 2022-03-21 PROCEDURE — 83735 ASSAY OF MAGNESIUM: CPT | Performed by: HOSPITALIST

## 2022-03-21 PROCEDURE — U0003 INFECTIOUS AGENT DETECTION BY NUCLEIC ACID (DNA OR RNA); SEVERE ACUTE RESPIRATORY SYNDROME CORONAVIRUS 2 (SARS-COV-2) (CORONAVIRUS DISEASE [COVID-19]), AMPLIFIED PROBE TECHNIQUE, MAKING USE OF HIGH THROUGHPUT TECHNOLOGIES AS DESCRIBED BY CMS-2020-01-R: HCPCS | Performed by: NURSE PRACTITIONER

## 2022-03-21 PROCEDURE — 27000221 HC OXYGEN, UP TO 24 HOURS

## 2022-03-21 PROCEDURE — 36415 COLL VENOUS BLD VENIPUNCTURE: CPT | Performed by: HOSPITALIST

## 2022-03-21 PROCEDURE — 85025 COMPLETE CBC W/AUTO DIFF WBC: CPT | Performed by: HOSPITALIST

## 2022-03-21 PROCEDURE — 25000003 PHARM REV CODE 250: Performed by: NURSE PRACTITIONER

## 2022-03-21 PROCEDURE — 97535 SELF CARE MNGMENT TRAINING: CPT | Mod: CO

## 2022-03-21 PROCEDURE — 97110 THERAPEUTIC EXERCISES: CPT | Mod: CQ

## 2022-03-21 PROCEDURE — 25000242 PHARM REV CODE 250 ALT 637 W/ HCPCS: Performed by: HOSPITALIST

## 2022-03-21 PROCEDURE — 94761 N-INVAS EAR/PLS OXIMETRY MLT: CPT

## 2022-03-21 PROCEDURE — 11000004 HC SNF PRIVATE

## 2022-03-21 PROCEDURE — 80076 HEPATIC FUNCTION PANEL: CPT | Performed by: HOSPITALIST

## 2022-03-21 PROCEDURE — 97530 THERAPEUTIC ACTIVITIES: CPT | Mod: CQ

## 2022-03-21 RX ORDER — LANOLIN ALCOHOL/MO/W.PET/CERES
1 CREAM (GRAM) TOPICAL DAILY
Status: DISCONTINUED | OUTPATIENT
Start: 2022-03-21 | End: 2022-04-01 | Stop reason: HOSPADM

## 2022-03-21 RX ORDER — LOPERAMIDE HYDROCHLORIDE 2 MG/1
2 CAPSULE ORAL 4 TIMES DAILY PRN
Status: DISCONTINUED | OUTPATIENT
Start: 2022-03-21 | End: 2022-04-01 | Stop reason: HOSPADM

## 2022-03-21 RX ORDER — FLUTICASONE FUROATE AND VILANTEROL 100; 25 UG/1; UG/1
1 POWDER RESPIRATORY (INHALATION) DAILY
Status: DISCONTINUED | OUTPATIENT
Start: 2022-03-22 | End: 2022-04-01 | Stop reason: HOSPADM

## 2022-03-21 RX ADMIN — LOPERAMIDE HYDROCHLORIDE 2 MG: 2 CAPSULE ORAL at 08:03

## 2022-03-21 RX ADMIN — PANTOPRAZOLE SODIUM 40 MG: 40 TABLET, DELAYED RELEASE ORAL at 09:03

## 2022-03-21 RX ADMIN — AMLODIPINE BESYLATE 10 MG: 10 TABLET ORAL at 09:03

## 2022-03-21 RX ADMIN — MELATONIN TAB 3 MG 6 MG: 3 TAB at 08:03

## 2022-03-21 RX ADMIN — SENNOSIDES AND DOCUSATE SODIUM 1 TABLET: 50; 8.6 TABLET ORAL at 09:03

## 2022-03-21 RX ADMIN — TICAGRELOR 90 MG: 90 TABLET ORAL at 09:03

## 2022-03-21 RX ADMIN — FERROUS SULFATE TAB 325 MG (65 MG ELEMENTAL FE) 1 EACH: 325 (65 FE) TAB at 03:03

## 2022-03-21 RX ADMIN — ASPIRIN 81 MG: 81 TABLET, COATED ORAL at 09:03

## 2022-03-21 RX ADMIN — TRIAMCINOLONE ACETONIDE: 1 CREAM TOPICAL at 09:03

## 2022-03-21 RX ADMIN — ATORVASTATIN CALCIUM 80 MG: 20 TABLET, FILM COATED ORAL at 08:03

## 2022-03-21 RX ADMIN — LOPERAMIDE HYDROCHLORIDE 2 MG: 2 CAPSULE ORAL at 03:03

## 2022-03-21 RX ADMIN — TIOTROPIUM BROMIDE INHALATION SPRAY 2 PUFF: 3.12 SPRAY, METERED RESPIRATORY (INHALATION) at 09:03

## 2022-03-21 RX ADMIN — Medication 1 CAPSULE: at 09:03

## 2022-03-21 RX ADMIN — GABAPENTIN 100 MG: 100 CAPSULE ORAL at 08:03

## 2022-03-21 RX ADMIN — ERGOCALCIFEROL 50000 UNITS: 1.25 CAPSULE ORAL at 09:03

## 2022-03-21 RX ADMIN — FUROSEMIDE 40 MG: 40 TABLET ORAL at 09:03

## 2022-03-21 RX ADMIN — METOPROLOL SUCCINATE 50 MG: 50 TABLET, EXTENDED RELEASE ORAL at 09:03

## 2022-03-21 RX ADMIN — ACETAMINOPHEN 650 MG: 325 TABLET ORAL at 08:03

## 2022-03-21 RX ADMIN — ISOSORBIDE MONONITRATE 30 MG: 30 TABLET, EXTENDED RELEASE ORAL at 09:03

## 2022-03-21 RX ADMIN — Medication 100 MG: at 09:03

## 2022-03-21 NOTE — PROGRESS NOTES
"                                                        [facility]                                  Skilled Nursing Facility                   Progress Note     Admit Date: 3/18/2022  LUZ  TBD  Principal Problem:  NSTEMI (non-ST elevated myocardial infarction)   HPI obtained from patient interview and chart review     Chief Complaint:  S/p hospitalization    HPI:   Ms Kerr is a 76 year old female with PMHx of DM2, HTN, HLD, CKD, COPD on home O2 who presents SNF following hospitalization for NSTEMI.  Admission to SNF for secondary to weakness and debility.     Patient initially presented to [facility] for a left heart catheterization to evaluate recently increasing episodes of angina requiring more frequent Nitroglycerin use. She has history of iodine allergy and was pretreated with prednisone, but post- procedure she developed SOB, respiratory distress requiring overnight hospitalization for monitoring. She also had elevated blood pressures with systolics greater than 200s during hospitalization. Overnight, as she woke up to urinate she felt 10/10 chest pain all over that she described as "burning", and "veins being ripped apart". STAT EKG at the time showed concerns for  lateral precordial ST depressions. She was transferred to [facility] for further evaluation of her ACS and potential emergent intervention. LHC at outside hospital was notable for significant coraonary artery disease. Patient admitted to CCU while pending evaluation for ACS intervention, given her NSTEMI. Interventional Cardiology and Cardiothoracic Surgery consulted. Initiated guideline directed medical therapy for ACS.   CTS evaluation did not recommend patient for CABG due to her debility as she is prohibitively high risk for CABG. Patient had significant abdominal pain overnight and distended abdomen with mass. In light of this, IC deferred LHC. CT of the abdomen showed a large rectus sheath hematoma. Given " "patient's kidney function, CTA was deferred temporarily but the abdominal mass continued to enlarge. CTA of the abdomen showed extravasation of contrast into the rectus sheath hematoma. IR consulted and performed embolization of the inferior epigastric and collateral arteries. Patient's Cr elevated to 1.9 likely due to contrast. Patient's Hgb dropped requiring transfusion with aim >10 for IC intervention. Patient was given 2 pRBC and Hgb responded up to 9.2. IC recommended that since patient had a recent bleed, would defer procedure for outpatient in 1-2 weeks. Patient discharged to SNF and has appointment with Dr. Chappell for possible PCI on 3/17/2022.       patient was sent to the ED on  3/15 for evaluation of chest pain. "Pt was hemodynamically stable. ECG was negative for ST changes. Trop flat x2. CXR showed the cardiomediastinal silhouette is stable and not significantly enlarged.  There are mild reticular opacities again seen bilaterally in the infrahilar region similar to the prior exam suggesting chronic interstitial changes. There is no acute lobar consolidation. There is increased lucency specially over the upper lungs as seen with COPD.  There is no evidence of pleural effusion or pneumothorax. Osseous structures grossly appear stable with degenerative changes of the shoulders noted. Cardiology and interventional cardiology followed. Interventional cards had no plans for angiogram at this time. Pt was continued on 81 mg ASA qd, Atorvastatin 80mg qhs, Metoprolol 50 mg XL, Ticagrelor 90mg bid. Pt given follow up with Dr. Chappell 4/7/22 in interventional clinic at discharged. Pt medically ready and returned to [facility] at discharge. Pt educated on hospital course and plan, verbally agrees and understands. All questions answered."      Today, patient endorses intermittent indigestion.      Past Medical History: Patient has a past medical history of CAD (coronary artery disease), Cancer, CHF (congestive heart " failure), Colitis, Colon cancer, COPD (chronic obstructive pulmonary disease), Decreased hearing, Diabetes mellitus, Diabetes mellitus, type 2, Hypercholesterolemia, Hypertension, Hypoxemia, Insomnia, Obesity, and Osteoarthritis.    Past Surgical History: Patient has a past surgical history that includes External ear surgery; Colectomy; Cholecystectomy; Flexible sigmoidoscopy (N/A, 9/19/2019); Eye surgery; Hysterectomy; Coronary stent placement; Cardiac catheterization; Coronary angioplasty; and ANGIOGRAM, CORONARY, WITH LEFT HEART CATHETERIZATION (N/A, 2/10/2022).    Social History: Patient reports that she quit smoking about 16 years ago. Her smoking use included cigarettes. She started smoking about 58 years ago. She has a 150.00 pack-year smoking history. She has never used smokeless tobacco. She reports current alcohol use. She reports that she does not use drugs.    Family History: family history includes Febrile seizures in her mother; Heart failure in her father.    Allergies: Patient is allergic to iodinated contrast media and strawberries [strawberry].    ROS  Constitutional: Negative for fever   Eyes: Negative for blurred vision, double vision   Respiratory: Negative for cough, shortness of breath   Cardiovascular: Negative for chest pain, palpitations, and leg swelling.   Gastrointestinal: Negative for abdominal pain, constipation, diarrhea, nausea, vomiting.   +Indigestion  Genitourinary:  Negative for dysuria, frequency   Musculoskeletal:  + generalized weakness.  +left hip pain, buttocks pain, left shoulder pain  Skin: Negative for itching and rash.   Neurological: Negative for dizziness, headaches.   Psychiatric/Behavioral: Negative for depression. The patient is not nervous/anxious.      24 hour Vital Sign Range   Temp:  [98.4 °F (36.9 °C)]   Pulse:  [68-78]   Resp:  [18]   BP: (127-131)/(60-65)   SpO2:  [93 %-98 %]     PEx  Constitutional: Patient appears debilitated.  No distress noted  HENT:    Head: Normocephalic and atraumatic.   Eyes: Pupils are equal, round  Neck: Normal range of motion. Neck supple.   Cardiovascular: Normal rate, regular rhythm and normal heart sounds.    Pulmonary/Chest: Effort normal and breath sounds are clear with diminished bases.  Supplemental oxygen in progress  Abdominal: Soft. Bowel sounds are normal.   Musculoskeletal: Normal range of motion.   Neurological: Alert and oriented to person, place, and time.   Psychiatric: Normal mood and affect. Behavior is normal.   Skin: Skin is warm and dry.  PVD changes to bilateral lower extremities.  Skin breakdown to buttocks    Recent Labs   Lab 03/21/22  0549      K 3.8      CO2 27   BUN 72*   CREATININE 1.9*   MG 1.5*       Recent Labs   Lab 03/21/22  0549   WBC 6.08   RBC 3.20*   HGB 9.2*   HCT 30.1*      MCV 94   MCH 28.8   MCHC 30.6*         Assessment and Plan:    NSTEMI (non-ST elevated myocardial infarction)  Coronary artery disease,  multivessel with history of previous PCI  - Recent hx of increasing episodes of angina requiring more frequent Nitroglycerin use and underwent LHC at OSH.   -Recent Left heart cath[02/11/22] showed:  · The RPAV lesion was 100% stenosed.  · The RPDA lesion was 100% stenosed.  · The Prox Cx to Mid Cx lesion was 80% stenosed.  · The 1st LPL lesion was 90% stenosed.  · The Prox RCA-2 lesion was 70% stenosed.  · The Prox RCA-1 lesion was 90% stenosed.  · The Mid LAD-2 lesion was 60% stenosed.  - continue 81 mg ASA qd, Atorvastatin 80mg qhs, Metoprolol 50 mg XL, Ticagrelor 90mg bid  - LHC deferred given rectus sheath hematoma. Plan for IC intervention 1-2 weeks after admission for outpatient.  Coronary atherosclerosis  - 3/8 required dose of nitroglycerin today, symptom resolve min after 1 administration  -  S/p ED visit and rehospitalization from 03/15 to 3/22.  Follow-up with Interventional Cardiology on 04/07    CKD (chronic kidney disease), stage III  - Baseline sCr 1.5- 1.8   -  creatinine increased but within baseline, discontinuing lisinopril as BP's are low to normal and patient with hyperkalemia, continue to monitor twice weekly BMPs, avoid nephrotoxic agents, renally dose medications when appropriate    Left hip pain  - x-ray completed, showing severe osteoarthritis  - will likely need orthopedic evaluation after discharge from [facility]  - Voltaren gel 2 g BID    Acute on chronic diastolic congestive heart failure  - TTE notable for Grade II left ventricular diastolic dysfunction, EF 65%  - admitted with Lasix 40 mg BID, lisinopril 10 mg daily  - Cardiac diet with Fluid restriction at 1.5L with strict I/Os and daily STANDING weights  - continue Lasix 40 mg daily    Partial thickness tissue loss  - triad to gluteal cleft, ordered for wound care consult    Essential hypertension, benign  - home regimen with amlodipine 5 mg daily, benazepril 40 mg daily  - continue amlodipine 10 mg daily, lisinopril 10 mg daily, metoprolol XL 50 mg daily  - 3/15 discontinuing lisinopril for low to normal BP's, hyperkalemia    COPD/emphysema  - Known history of severe COPD (GOLD IV D PFT 2020). On home oxygen (2-3L)  - Home medications: albuterol (VENTOLIN HFA) 90 mcg/actuation inhaler, ipratropium-albuteroL (COMBIVENT RESPIMAT)  mcg/actuation inhaler,  umeclidinium (INCRUSE ELLIPTA) 62.5 mcg/actuation inhalation capsule, fluticasone-salmeterol diskus inhaler 250-50 mcg  - continue Breo and Spiriva inhaler   - DuoNebs PRN    DM type 2, controlled, with complication  - last A1c 5.2 on 01/27/2022  - diabetic diet, Accu-Cheks AC/HS  - continue LDSSI PRN     Gastroesophageal reflux disease without esophagitis  - continue Protonix 40 mg daily    Hypercholesterolemia  - continue Atorvastatin 40mg    Obesity  - BMI currently 33.94  - RD following, weight loss encouraged    Rectus sheath hematoma  - Patient had severe abdominal pain the day before with a mass on the abdomen. Patient was unable to have a BM  yesterday. KUB was drawn and showed a large stool burden. Continued bowel regimen but pain was persistent. CT abdomen without contrast and lactic drawn. CT abdomen showed rectal sheath hematoma extending to the pelvis. IR consulted and recommended CTA of the abdomen to identify bleeding vessel. Due to kidney function, held off on CTA for concerns of contrast induced nephropathy as patient had a Alfredo score of 16 with 100cc of contrast, 15 with 75 cc contrast. IR consulted and following. Hgb went from 13 on admission --> 10.6 --> 9.9 --> 8.9. hematoma was expanding. IR performed embolization of inferior epigastric and collateral vessels.   - continue to monitor abdominal mass     Venous stasis  - Chronic history of venous stasis of bilateral lower extremities limited to breakdown of skin without varicose veins  - Patient denies increase in leg swelling over her baseline  - wound care RN following    Vitamin-D deficiency  - continue ergocalciferol 85333 units weekly    Debility   - Continue with PT/OT for gait training and strengthening and restoration of ADL's   - Encourage mobility, OOB in chair, and early ambulation as appropriate  - Fall precautions   - Monitor for bowel and bladder dysfunction  - Monitor for and prevent skin breakdown and pressure ulcers  - Continue DVT prophylaxis with ASA/ticagrelor, frequent ambulation         Anticipate disposition:  Home with home health      Follow-up needed during SNF stay- IC (4/7)     Follow-up needed after discharge from SNF:   - PCP,   Hospital follow-up, needs to be scheduled  -  Interventional cardiology (4/7)    Future Appointments   Date Time Provider Department Center   4/7/2022  1:40 PM Scott Chappell MD [facility]   4/27/2022 11:20 AM Antonieta Marquez NP [facility]       Natalie Clemons NP  Department of Hospital Medicine   [facility]       Total time of the visit 68 minutes  [id number]  Non  physical exam/ non charting time: 48 minutes   Description of non physical exam/non charting time: counseling patient on clinical conditions and therapies provided regarding  Importance of follow-up appointment within  Original Cardiology, reporting indigestion or chest pain symptoms, importance of PO nutrition and hydration and participation with therapy. Extensive chart review completed including all consultation notes.  All pertinent laboratory and radiographical images reviewed.        DOS: 3/21/2022       Patient note was created using MModal Dictation.  Any errors in syntax or even information may not have been identified and edited on initial review prior to signing this note.

## 2022-03-21 NOTE — CLINICAL REVIEW
Clinical Pharmacy Chart Review Note      Admit Date: 3/18/2022   LOS: 3 days       Marisol Kerr is a 76 y.o. female admitted to SNF for PT/OT after hospitalization for NSTEMI (non-ST elevated myocardial infarction).    Active Hospital Problems    Diagnosis  POA    Vitamin D deficiency [E55.9]  Yes    Chronic diastolic congestive heart failure [I50.32]  Yes    Chronic hypoxemic respiratory failure [J96.11]  Yes     3 L at home      COPD/emphysema [J44.9]  Yes     Chronic    Primary osteoarthritis of both hips [M16.0]  Yes    Chronic respiratory failure with hypoxia [J96.11]  Yes    CKD (chronic kidney disease), stage III [N18.30]  Yes    Diabetic peripheral neuropathy [E11.42]  Yes    Coronary artery disease, multivessel with history of previous PCI [I25.10]  Yes    DM type 2, controlled, with complication [E11.8]  Yes    Gastroesophageal reflux disease without esophagitis [K21.9]  Yes    Hypertension [I10]  Yes    Coronary atherosclerosis [I25.10]  Yes    Hypercholesterolemia [E78.00]  Yes      Resolved Hospital Problems   No resolved problems to display.     Review of patient's allergies indicates:   Allergen Reactions    Iodinated contrast media     Strawberries [strawberry] Hives and Itching     Patient Active Problem List    Diagnosis Date Noted    Rectus sheath hematoma 02/14/2022    NSTEMI (non-ST elevated myocardial infarction) 02/11/2022    Acute on chronic respiratory failure 02/10/2022    Hypertensive urgency 02/10/2022    Hyperkalemia 02/10/2022    Venous stasis 02/10/2022    Hard of hearing 02/10/2022    Bradycardia 10/07/2021    Acute colitis 09/26/2019    Primary insomnia 09/26/2019    Vitamin D deficiency 09/26/2019    Hypophosphatemia 09/19/2019    Acute on chronic diarrhea 09/18/2019    Decubitus ulcer of sacral region, stage 1 09/18/2019    Bilateral nonobstructing nephrolithiasis 09/18/2019    Chronic diastolic congestive heart failure 09/17/2019    Chest pain  09/17/2019    COPD/emphysema 01/16/2019    Chronic hypoxemic respiratory failure 01/16/2019    Acute otitis externa of left ear 01/16/2019    Primary osteoarthritis of both hips 09/21/2018    Chronic respiratory failure with hypoxia 04/25/2018    Adrenal adenoma 01/27/2017    Lung nodule 10/27/2016    History of colon cancer 10/27/2016    Pure hypercholesterolemia 10/27/2016    Fall 09/29/2016    Class 2 obesity in adult 05/18/2015    CKD (chronic kidney disease), stage III 10/08/2014    Osteoporosis, post-menopausal 10/08/2014    Diabetic peripheral neuropathy 06/26/2014    Diabetic peripheral vascular disease 06/26/2014    Edema 06/03/2013    DM type 2, controlled, with complication 06/03/2013    Empyema lung 06/03/2013    Hypoxia 06/03/2013    Hypertension 06/03/2013    Coronary artery disease, multivessel with history of previous PCI 06/03/2013    Gastroesophageal reflux disease without esophagitis 06/03/2013    Hypercholesterolemia 04/09/2013    Essential hypertension, benign 04/09/2013    Depressive disorder, not elsewhere classified 04/09/2013    Coronary atherosclerosis 04/09/2013    Persistent disorder of initiating or maintaining sleep 04/09/2013       Scheduled Meds:    amLODIPine  10 mg Oral Daily    aspirin  81 mg Oral Daily    atorvastatin  80 mg Oral QHS    coenzyme Q10  100 mg Oral Daily    ergocalciferol  50,000 Units Oral Q7 Days    furosemide  40 mg Oral Daily    gabapentin  100 mg Oral QHS    isosorbide mononitrate  30 mg Oral Daily    metoprolol succinate  50 mg Oral Daily    omega-3 fatty acids-fish oil  1 capsule Oral Daily    pantoprazole  40 mg Oral Daily    senna-docusate 8.6-50 mg  1 tablet Oral BID    ticagrelor  90 mg Oral BID    tiotropium bromide  2 puff Inhalation Daily    triamcinolone acetonide 0.1%   Topical (Top) BID     Continuous Infusions:   PRN Meds: acetaminophen, acetaminophen, albuterol, calcium carbonate, melatonin,  ondansetron    OBJECTIVE:     Vital Signs (Last 24H)  Temp:  [98.4 °F (36.9 °C)]   Pulse:  [68-78]   Resp:  [18]   BP: (127-131)/(60-65)   SpO2:  [93 %-98 %]     Laboratory:  CBC:   Recent Labs   Lab 03/17/22 0306 03/18/22 0314 03/21/22  0549   WBC 5.09 5.52 6.08   RBC 2.90* 3.15* 3.20*   HGB 8.3* 9.1* 9.2*   HCT 27.0* 30.0* 30.1*   * 138* 155   MCV 93 95 94   MCH 28.6 28.9 28.8   MCHC 30.7* 30.3* 30.6*     BMP:   Recent Labs   Lab 03/17/22 0306 03/18/22 0314 03/21/22  0549   GLU 86 82 84    142 144   K 4.1 3.9 3.8    108 107   CO2 25 25 27   BUN 65* 62* 72*   CREATININE 1.5* 1.6* 1.9*   CALCIUM 8.9 8.9 8.9   MG 1.8 1.7 1.5*     CMP:   Recent Labs   Lab 03/16/22 0025 03/16/22  0439 03/17/22 0306 03/18/22 0314 03/21/22  0549      < > 86 82 84   CALCIUM 8.8   < > 8.9 8.9 8.9   ALBUMIN 2.9*  --   --   --  2.9*   PROT 5.6*  --   --   --  5.7*      < > 144 142 144   K 5.3*   < > 4.1 3.9 3.8   CO2 20*   < > 25 25 27      < > 110 108 107   BUN 74*   < > 65* 62* 72*   CREATININE 1.8*   < > 1.5* 1.6* 1.9*   ALKPHOS 95  --   --   --  82   ALT 13  --   --   --  22   AST 14  --   --   --  18   BILITOT 0.4  --   --   --  0.6    < > = values in this interval not displayed.     LFTs:   Recent Labs   Lab 03/16/22 0025 03/21/22  0549   ALT 13 22   AST 14 18   ALKPHOS 95 82   BILITOT 0.4 0.6   PROT 5.6* 5.7*   ALBUMIN 2.9* 2.9*     Lab Results   Component Value Date    HGBA1C 5.2 01/27/2022           ASSESSMENT/PLAN:     I have reviewed the medications in compliance with CMS Regulation F756 of the [place]. Based on information gathered, the following items may need to be addressed:     **According to PMH and home medication list, patient takes the following medications at home. These medications are not currently ordered at [facility]:  · Advair   · Benazepril (non-formulary, was taking lisinopril, now on hold for ISSA)  · Furosemide (currently on hold for ISSA)  · Lovastatin (non-formulary, currently  taking atorvastatin)  · NTG patch  · Temazepam (non-formulary, currently taking melatonin)  · Umclindinium (non-formulary, currently taking tiotropium)        Medications reviewed by PharmD, please re-consult if needed.        Ronda Steve, Pharm. D.  Clinical Pharmacist  [facility]

## 2022-03-21 NOTE — PLAN OF CARE
Problem: Adult Inpatient Plan of Care  Goal: Plan of Care Review  Outcome: Ongoing, Progressing   Recommendations      1. Continue Cardiac diet.  Goals: 1. Pt's intake meals >75% by RD follow up.  Nutrition Goal Status: new  Communication of RD Recs: reviewed with physician     Assessment and Plan  Nutrition Problem  Increased nutrient needs     Related to (etiology):   Wound healing     Signs and Symptoms (as evidenced by):   Pt with PI stage 2 to coccyx     Interventions(treatment strategy):  Collaboration of care with providers.  Mineral modified diet: Cardiac  Modular protein supplement: Carlo BID     Nutrition Diagnosis Status:   New

## 2022-03-21 NOTE — PT/OT/SLP PROGRESS
"Physical Therapy Treatment    Patient Name:  Marisol Kerr   MRN:  [id number]  Admit Date: 3/18/2022  Admitting Diagnosis: <principal problem not specified>    General Precautions: Standard, fall   Orthopedic Precautions:N/A   Braces:   N/A    Recommendations:     Discharge Recommendations:  home with home health   Level of Assistance Recommended at Discharge: Intermittent assistance   Discharge Equipment Recommendations: walker, rolling   Barriers to discharge: Decreased caregiver support    Assessment:     Marisol Kerr is a 76 y.o. female admitted with a medical diagnosis of <principal problem not specified> . requiring light assistance and verbal cues for bed mob, sit < > stand transitions, standing and gait to prevent falls due to weakness, fatigue, FFP and mild instability.  Pt remains motivated to participate in PT session and will cont to benefit from skilled PT intervention.       Performance deficits affecting function:  weakness, impaired endurance, impaired self care skills, impaired functional mobilty, gait instability, impaired balance, decreased upper extremity function, decreased lower extremity function, pain .    Rehab Potential is good    Activity Tolerance: Good    Plan:     Patient to be seen 5 x/week to address the above listed problems via gait training, therapeutic activities, therapeutic exercises, neuromuscular re-education, wheelchair management/training    · Plan of Care Expires: 04/18/22  · Plan of Care Reviewed with: patient    Subjective     "I need to go to the restroom".     Pain/Comfort:  · Pain Rating 1: 0/10  · Pain Rating Post-Intervention 1: 0/10    Patient's cultural, spiritual, Yarsani conflicts given the current situation:  · no    Objective:     2 attempts for tx session 2* pt eating breakfast at 9:09 am     Patient found up in chair with oxygen upon PT entry to room.     Therapeutic Activities and Exercises: Pt performs B LE AROM ther ex's while seated x2 sets of 15 reps with " vc's    Functional Mobility:  · Transfers:     · Sit to Stand:  stand by assistance with rolling walker  · Toilet Transfer: stand by assistance with  rolling walker  using  Step Transfer  · Gait: RW CGA/close SBA 20 ft x2 trials and 80ft with seated rest breaks in between trials.  Pt demonstrates FFP  · Balance: Pt tolerates static standing while being assisted with donning/doffing diaper with SBA using RW     Patient provided with daily orientation and goals of this PT session. They were educated to call for assistance and to transfer with hospital staff only.  Also, pt was educated on the effects of prolonged immobility and the importance of performing OOB activity and exercises to promote healing and reduce recovery time      AM-PAC 6 CLICK MOBILITY  16    Patient left up in chair with all lines intact and call button in reach.    GOALS:   Multidisciplinary Problems     Physical Therapy Goals        Problem: Physical Therapy Goal    Goal Priority Disciplines Outcome Goal Variances Interventions   Physical Therapy Goal     PT, PT/OT Ongoing, Progressing     Description: Goals to be met by: 22     Patient will increase functional independence with mobility by performin. Supine to sit with Set-up [place]  2. Sit to supine with Set-up [place]  3. Rolling to Left and Right with Supervision.  4. Sit to stand transfer with Supervision  5. Bed to chair transfer with Supervision using Rolling Walker  6. Gait  x 150 feet with Supervision using Rolling Walker.   7. Wheelchair propulsion x75 feet with Supervision using bilateral uppper extremities  8. Ascend/Descend 4 inch curb step with Stand-by Assistance using Rolling Walker.  9. Pt in standing will use a reacher to  an item from the floor w/ a RW and SPV                     Time Tracking:     PT Received On:   3/21/2022  PT Total Time (min):   42 min    Billable Minutes: Gait Training 15, Therapeutic Activity 12 and Therapeutic Exercise 15        PT/PTA: PTA     PTA Visit Number: 1 03/21/2022

## 2022-03-21 NOTE — CONSULTS
[facility] - Skilled Nursing  Adult Nutrition  Consult Note    SUMMARY     Recommendations     1. Continue Cardiac diet.  Goals: 1. Pt's intake meals >75% by RD follow up.  Nutrition Goal Status: new  Communication of RD Recs: reviewed with physician    Assessment and Plan  Nutrition Problem  Increased nutrient needs    Related to (etiology):   Wound healing    Signs and Symptoms (as evidenced by):   Pt with PI stage 2 to coccyx    Interventions(treatment strategy):  Collaboration of care with providers.  Mineral modified diet: Cardiac  Modular protein supplement: Calro BID    Nutrition Diagnosis Status:   New       Malnutrition Assessment                 Orbital Region (Subcutaneous Fat Loss): well nourished  Upper Arm Region (Subcutaneous Fat Loss): well nourished  Thoracic and Lumbar Region: well nourished   Episcopal Region (Muscle Loss): well nourished  Clavicle Bone Region (Muscle Loss): well nourished  Clavicle and Acromion Bone Region (Muscle Loss): well nourished  Scapular Bone Region (Muscle Loss): well nourished  Dorsal Hand (Muscle Loss): well nourished  Patellar Region (Muscle Loss): well nourished  Anterior Thigh Region (Muscle Loss): well nourished  Posterior Calf Region (Muscle Loss): well nourished                 Reason for Assessment    Reason For Assessment: consult  Diagnosis:  (chronic hyposemic respiratory failure)  Relevant Medical History: COPD, DM2, HTN, CAD, Colon Ca, NSTEMI  Interdisciplinary Rounds: attended  General Information Comments: Pt with % intake meals over last week.  lbs, wt stable. Pt discharged from this facility last week and was doing well. Noted with PI stage to coccyx.  Pt is willing to try Carlo for wound healing. Completed NFPE today: pt appears well nourished.  Nutrition Discharge Planning: cardiac healthy, carb consistent diet    Nutrition Risk Screen    Nutrition Risk Screen: no indicators present    Nutrition/Diet History    Patient Reported  "Diet/Restrictions/Preferences: heart healthy  Spiritual, Cultural Beliefs, Pentecostal Practices, Values that Affect Care: no    Anthropometrics    Temp: 98.4 °F (36.9 °C)  Height: 5' 3" (160 cm)  Height (inches): 63 in  Weight Method: Bed Scale  Weight: 83.5 kg (184 lb 1.4 oz)  Weight (lb): 184.09 lb  Ideal Body Weight (IBW), Female: 115 lb  % Ideal Body Weight, Female (lb): 159.11 %  BMI (Calculated): 32.6       Lab/Procedures/Meds    Pertinent Labs Reviewed: reviewed  Pertinent Labs Comments: H/H 9.2/30.1, BUN 72, Cre 1.9, eGFR 25.6, Mg 1.5, A1c 5.2%  Pertinent Medications Reviewed: reviewed  Pertinent Medications Comments: coenyyme 10, vitamin D, lasix, protonix, omega 3, gabapentin, senna, colace    Estimated/Assessed Needs    Weight Used For Calorie Calculations: 83.5 kg (184 lb 1.4 oz)  Energy Calorie Requirements (kcal): 1434 kcal  Energy Need Method: Scurry-St Jeor (PAL 1.10)  Protein Requirements: 775-92g  Weight Used For Protein Calculations: 83.5 kg (184 lb 1.4 oz)        RDA Method (mL): 1434  CHO Requirement: 179g      Nutrition Prescription Ordered    Current Diet Order: Cardiac    Evaluation of Received Nutrient/Fluid Intake    Comments: last BM 3/19  % Intake of Estimated Energy Needs: 75 - 100 %  % Meal Intake: 75 - 100 %    Nutrition Risk    Level of Risk/Frequency of Follow-up: low       Monitor and Evaluation    Food and Nutrient Intake: energy intake, food and beverage intake  Food and Nutrient Adminstration: diet order  Knowledge/Beliefs/Attitudes: food and nutrition knowledge/skill  Anthropometric Measurements: weight, weight change  Biochemical Data, Medical Tests and Procedures: electrolyte and renal panel, gastrointestinal profile, glucose/endocrine profile, inflammatory profile, lipid profile  Nutrition-Focused Physical Findings: overall appearance, extremities, muscles and bones, head and eyes, skin       Nutrition Follow-Up    RD Follow-up?: Yes  "

## 2022-03-21 NOTE — PLAN OF CARE
Problem: Adult Inpatient Plan of Care  Goal: Plan of Care Review  Outcome: Ongoing, Progressing  Goal: Patient-Specific Goal (Individualized)  Outcome: Ongoing, Progressing  Goal: Absence of Hospital-Acquired Illness or Injury  Outcome: Ongoing, Progressing  Goal: Optimal Comfort and Wellbeing  Outcome: Ongoing, Progressing  Goal: Readiness for Transition of Care  Outcome: Ongoing, Progressing     Problem: Diabetes Comorbidity  Goal: Blood Glucose Level Within Targeted Range  Outcome: Ongoing, Progressing     Problem: Impaired Wound Healing  Goal: Optimal Wound Healing  Outcome: Ongoing, Progressing     Problem: Skin Injury Risk Increased  Goal: Skin Health and Integrity  Outcome: Ongoing, Progressing     Problem: Fall Injury Risk  Goal: Absence of Fall and Fall-Related Injury  Outcome: Ongoing, Progressing

## 2022-03-21 NOTE — PT/OT/SLP PROGRESS
Occupational Therapy   Treatment    Name: Marisol Kerr  MRN: [id number]  Admit Date: 3/18/2022  Admitting Diagnosis:  Chronic Diastolic Heart Failure    General Precautions: Standard, fall   Orthopedic Precautions:N/A   Braces:       Recommendations:     Discharge Recommendations: home with home health  Level of Assistance Recommended at Discharge: Intermittent supervision  Discharge Equipment Recommendations:  none  Barriers to discharge:  Decreased caregiver support    Assessment:     Marisol Kerr is a 76 y.o. female with a medical diagnosis of Chronic Diastolic Heart Failure  She presents with  Performance deficits affecting function are  weakness, impaired endurance, impaired sensation, impaired self care skills, impaired functional mobilty, gait instability, impaired balance, decreased coordination, decreased upper extremity function, decreased lower extremity function, pain, decreased safety awareness, decreased ROM, impaired skin, impaired cardiopulmonary response to activity.   Pt. Was cooperative and participated well with session on this day. Pt. With mild exertion thorough task on this day mini breaks in between Pt  continues to demonstrate levels of physical deficits with  functional indep with daily management activities tasks, selfcare skills with balance,  functional mobility, UB strength and endurance. Pt. Will continue to benefit from continued OT to progress towards goals     Rehab Potential is fair    Activity tolerance:  Fair    Plan:     Patient to be seen 6 x/week to address the above listed problems via self-care/home management, therapeutic activities, therapeutic exercises    · Plan of Care Expires: 04/18/22  · Plan of Care Reviewed with: patient    Subjective     Communicated with: nsg and Pt. prior to session. I am doing well today    Pain/Comfort:  · Pain Rating 1: 0/10  · Pain Rating Post-Intervention 1: 0/10    Patient's cultural, spiritual, Lutheran conflicts given the current  situation:  · no    Objective:     Patient found up in chair with oxygen   upon OT entry to room.      Functional Mobility/Transfers:  · Patient completed Sit <> Stand Transfer with contact guard assistance  with  rolling walker     Activities of Daily Living:  · Grooming: supervision seaated at sink level with grooming needs  · Bathing: minimum assistance standing/seated at sink level with grooming needs  · Upper Body Dressing: minimum assistance to doff/beth pull over shirt  · Lower Body Dressing: minimum assistance to beth pants seated and to manage over hips instace with RW for balance  · Toileting: minimum assistance with cleaning and clothing mangement     [facility] 6 Click ADL: 18    Treatment & Education:  Pt edu on role of OT, POC, safety when performing self care tasks , benefit of performing OOB activity, and safety when performing functional transfers and mobility management for preparation with goals to progress towards next level of care    Patient left up in chair with all lines intact and call button in reachEducation:      GOALS:   Multidisciplinary Problems     Occupational Therapy Goals        Problem: Occupational Therapy Goal    Goal Priority Disciplines Outcome Interventions   Occupational Therapy Goal     OT, PT/OT Ongoing, Progressing    Description: Goals to be met by: 04/01/22     Patient will increase functional independence with ADLs by performing:    UE Dressing with Modified [place].  LE Dressing with Modified [place] using AD.  Grooming while standing at sink with Modified [place].  Toileting from bedside commode with Modified [place] for hygiene and clothing management.   Bathing from  shower chair/bench with Minimal Assistance.  Toilet transfer to bedside commode with Modified [place].  Upper extremity exercise program 3x15 reps per handout, with supervision to increase UE strength/endurance for improved func mobility skills  Stand during functional task for 10'  with S in preparation for increased independence during standing ADLs..                     Time Tracking:     OT Date of Treatment: OT Date of Treatment: 03/21/22  OT Total Time (min):      Billable Minutes:Self Care/Home Management 43    3/21/2022  A client care conference was performed between the LOTR and TYLER, prior to treatment to discuss the patient's status, treatment plan and established goals.

## 2022-03-21 NOTE — PLAN OF CARE
Problem: Occupational Therapy Goal  Goal: Occupational Therapy Goal  Description: Goals to be met by: 04/01/22     Patient will increase functional independence with ADLs by performing:    UE Dressing with Modified [place].  LE Dressing with Modified [place] using AD.  Grooming while standing at sink with Modified [place].  Toileting from bedside commode with Modified [place] for hygiene and clothing management.   Bathing from  shower chair/bench with Minimal Assistance.  Toilet transfer to bedside commode with Modified [place].  Upper extremity exercise program 3x15 reps per handout, with supervision to increase UE strength/endurance for improved func mobility skills  Stand during functional task for 10' with S in preparation for increased independence during standing ADLs..    Outcome: Ongoing, Progressing

## 2022-03-22 DIAGNOSIS — Z20.822 ENCOUNTER FOR LABORATORY TESTING FOR COVID-19 VIRUS: ICD-10-CM

## 2022-03-22 LAB
SARS-COV-2 RNA RESP QL NAA+PROBE: NOT DETECTED
SARS-COV-2- CYCLE NUMBER: NORMAL

## 2022-03-22 PROCEDURE — 11000004 HC SNF PRIVATE

## 2022-03-22 PROCEDURE — 94761 N-INVAS EAR/PLS OXIMETRY MLT: CPT

## 2022-03-22 PROCEDURE — 27000221 HC OXYGEN, UP TO 24 HOURS

## 2022-03-22 PROCEDURE — 99900035 HC TECH TIME PER 15 MIN (STAT)

## 2022-03-22 PROCEDURE — 25000242 PHARM REV CODE 250 ALT 637 W/ HCPCS: Performed by: NURSE PRACTITIONER

## 2022-03-22 PROCEDURE — 25000003 PHARM REV CODE 250: Performed by: HOSPITALIST

## 2022-03-22 PROCEDURE — 25000003 PHARM REV CODE 250: Performed by: NURSE PRACTITIONER

## 2022-03-22 PROCEDURE — 97535 SELF CARE MNGMENT TRAINING: CPT | Mod: CO

## 2022-03-22 PROCEDURE — 97110 THERAPEUTIC EXERCISES: CPT | Mod: CQ

## 2022-03-22 RX ORDER — MICONAZOLE NITRATE 2 %
POWDER (GRAM) TOPICAL 2 TIMES DAILY
Status: DISCONTINUED | OUTPATIENT
Start: 2022-03-22 | End: 2022-04-01 | Stop reason: HOSPADM

## 2022-03-22 RX ADMIN — TICAGRELOR 90 MG: 90 TABLET ORAL at 08:03

## 2022-03-22 RX ADMIN — FERROUS SULFATE TAB 325 MG (65 MG ELEMENTAL FE) 1 EACH: 325 (65 FE) TAB at 09:03

## 2022-03-22 RX ADMIN — ATORVASTATIN CALCIUM 80 MG: 20 TABLET, FILM COATED ORAL at 08:03

## 2022-03-22 RX ADMIN — TRIAMCINOLONE ACETONIDE: 1 CREAM TOPICAL at 09:03

## 2022-03-22 RX ADMIN — TIOTROPIUM BROMIDE INHALATION SPRAY 2 PUFF: 3.12 SPRAY, METERED RESPIRATORY (INHALATION) at 10:03

## 2022-03-22 RX ADMIN — ASPIRIN 81 MG: 81 TABLET, COATED ORAL at 08:03

## 2022-03-22 RX ADMIN — MICONAZOLE NITRATE 2 % TOPICAL POWDER: at 10:03

## 2022-03-22 RX ADMIN — Medication 100 MG: at 08:03

## 2022-03-22 RX ADMIN — GABAPENTIN 100 MG: 100 CAPSULE ORAL at 08:03

## 2022-03-22 RX ADMIN — Medication 1 CAPSULE: at 08:03

## 2022-03-22 RX ADMIN — ISOSORBIDE MONONITRATE 30 MG: 30 TABLET, EXTENDED RELEASE ORAL at 08:03

## 2022-03-22 RX ADMIN — MICONAZOLE NITRATE 2 % TOPICAL POWDER: at 09:03

## 2022-03-22 RX ADMIN — FLUTICASONE FUROATE AND VILANTEROL TRIFENATATE 1 PUFF: 100; 25 POWDER RESPIRATORY (INHALATION) at 10:03

## 2022-03-22 RX ADMIN — FUROSEMIDE 40 MG: 40 TABLET ORAL at 08:03

## 2022-03-22 RX ADMIN — PANTOPRAZOLE SODIUM 40 MG: 40 TABLET, DELAYED RELEASE ORAL at 08:03

## 2022-03-22 NOTE — DISCHARGE SUMMARY
"[facility] - Tele[facility] Stepdown ([place] [id number])  [facility] Medicine  Discharge Summary      Patient Name: Marisol Kerr  MRN: [id number]  Patient Class: OP- Observation  Admission Date: 3/15/2022  Hospital Length of Stay: 0 days  Discharge Date and Time: 3/18/2022  3:40 PM  Attending Physician: Neal Dickinson MD  Discharging Provider: Nighat Mcdonald PA-C  Primary Care Provider: Khadar Dwyer MD  [facility] Medicine Team: [facility] MED Y Nighat Mcdonald PA-C    HPI:   Marisol Kerr is a 76 y.o. female with CAD, CHF, HTN, HLD, T2DM, CKD, and COPD on 2 L nasal cannula who presented to the ED from [facility] for evaluation of chest pain. She was laying in bed when she noticed a substernal chest tightness that felt like a "chest cold". The pain was 10/10 but improved on its own prior to receiving NTG with EMS. She denies associated nausea or diaphoresis. She says the pain is different from her previous chest pain, but it scared her. She did have an episode of her usual chest pain a few days ago that was severe and relieved with NTG. She denies SOB, cough, abdominal pain, LE edema.     Of note, patient was admitted on 2/11/22 for NSTEMI. Interventional cardiology and CTS were both consulted. CABG was not recommended due to patient's debility. They were planning for LHC, however patient developed a rectal sheath hematoma requiring IR embolization. Patient had anemia requiring blood transfusions later in the admission. Interventional cardiology defered procedure to outpatient setting due to recent bleed. Patient was discharged to SNF on 2/22. Has appointment with Dr. Chappell for possible PCI on 3/17/2022.     ED: VSSAF. H/H stable. K 5.4. Cr 1.7. . Troponin 0.014 (lower than prev admission). EKG without acute ST changes.       * No surgery found *      Hospital Course:   76 y.o. who was admitted to hospital medicine for chest pain. Pt was hemodynamically stable. ECG was negative for ST changes. Trop flat x2. CXR showed " the cardiomediastinal silhouette is stable and not significantly enlarged.  There are mild reticular opacities again seen bilaterally in the infrahilar region similar to the prior exam suggesting chronic interstitial changes. There is no acute lobar consolidation. There is increased lucency specially over the upper lungs as seen with COPD.  There is no evidence of pleural effusion or pneumothorax. Osseous structures grossly appear stable with degenerative changes of the shoulders noted. Cardiology and interventional cardiology followed. Interventional cards had no plans for angiogram at this time. Pt was continued on 81 mg ASA qd, Atorvastatin 80mg qhs, Metoprolol 50 mg XL, Ticagrelor 90mg bid. Pt given follow up with Dr. Chappell 4/7/22 in interventional clinic at discharged. Pt medically ready and returned to [facility] at discharge. Pt educated on hospital course and plan, verbally agrees and understands. All questions answered.        Goals of Care Treatment Preferences:  Code Status: Full Code      Consults:   Consults (From admission, onward)        Status Ordering Provider     Inpatient consult to Interventional Cardiology  Once        Provider:  (Not yet assigned)    Completed YARITZA HAZEL     Inpatient consult to Cardiology  Once        Provider:  (Not yet assigned)    Completed PARTH JEFFREY          * Chest pain  - EKG without ST changes, troponin 0.014, will trend  - Recent hx of increasing episodes of angina requiring more frequent Nitroglycerin use and underwent LHC at OSH.   -Recent Left heart cath[02/11/22] showed:  · The RPAV lesion was 100% stenosed.  · The RPDA lesion was 100% stenosed.  · The Prox Cx to Mid Cx lesion was 80% stenosed.  · The 1st LPL lesion was 90% stenosed.  · The Prox RCA-2 lesion was 70% stenosed.  · The Prox RCA-1 lesion was 90% stenosed.  · The Mid LAD-2 lesion was 60% stenosed.  - pt scheduled with cards on 3/17 for follow up appointment, will consult  -  interventional cardiology consulted, no plans for angiogram at this time  - continue 81 mg ASA qd, Atorvastatin 80mg qhs, Metoprolol 50 mg XL, Ticagrelor 90mg bid  - EKG prn chest pain  - NTG prn chest pain      Final Active Diagnoses:    Diagnosis Date Noted POA    PRINCIPAL PROBLEM:  Chest pain [R07.9] 09/17/2019 Yes    Hyperkalemia [E87.5] 02/10/2022 Yes    Chronic diastolic congestive heart failure [I50.32] 09/17/2019 Yes    COPD/emphysema [J44.9] 01/16/2019 Yes     Chronic    Chronic hypoxemic respiratory failure [J96.11] 01/16/2019 Yes    CKD (chronic kidney disease), stage III [N18.30] 10/08/2014 Yes    DM type 2, controlled, with complication [E11.8] 06/03/2013 Yes    Coronary artery disease, multivessel with history of previous PCI [I25.10] 06/03/2013 Yes    Gastroesophageal reflux disease without esophagitis [K21.9] 06/03/2013 Yes    Hypercholesterolemia [E78.00] 04/09/2013 Yes    Essential hypertension, benign [I10] 04/09/2013 Yes      Problems Resolved During this Admission:       Discharged Condition: good    Disposition: Home or Self Care    Follow Up:    Patient Instructions:      Notify your health care provider if you experience any of the following:  temperature >100.4     Notify your health care provider if you experience any of the following:  severe uncontrolled pain     Notify your health care provider if you experience any of the following:  redness, tenderness, or signs of infection (pain, swelling, redness, odor or green/yellow discharge around incision site)     Notify your health care provider if you experience any of the following:  persistent dizziness, light-headedness, or visual disturbances     Notify your health care provider if you experience any of the following:  increased confusion or weakness     Notify your health care provider if you experience any of the following:  difficulty breathing or increased cough     Activity as tolerated       Significant Diagnostic  Studies: Labs: All labs within the past 24 hours have been reviewed    Pending Diagnostic Studies:     None         Medications:  Reconciled Home Medications:      Medication List      CONTINUE taking these medications    acetaminophen 325 MG tablet  Commonly known as: TYLENOL  Take 2 tablets (650 mg total) by mouth every 4 (four) hours as needed.     albuterol 90 mcg/actuation inhaler  Commonly known as: VENTOLIN HFA  Inhale 2 puffs into the lungs every 6 (six) hours as needed for Wheezing or Shortness of Breath. Rescue     amLODIPine 10 MG tablet  Commonly known as: NORVASC  Take 1 tablet (10 mg total) by mouth once daily.     aspirin 81 MG EC tablet  Commonly known as: ECOTRIN  Take 81 mg by mouth every morning.     atorvastatin 80 MG tablet  Commonly known as: LIPITOR  Take 1 tablet (80 mg total) by mouth every evening.     coenzyme Q10 100 mg capsule  Take 100 mg by mouth every morning.     ergocalciferol 50,000 unit Cap  Commonly known as: VITAMIN D2  Take 1 capsule (50,000 Units total) by mouth every 7 days.     fish oil-omega-3 fatty acids 300-1,000 mg capsule  Take 1 capsule by mouth every morning.     furosemide 20 MG tablet  Commonly known as: LASIX  Take 2 tablets (40 mg total) by mouth once daily.     lisinopriL 10 MG tablet  Take 1 tablet (10 mg total) by mouth once daily.     metoprolol succinate 50 MG 24 hr tablet  Commonly known as: TOPROL-XL  Take 1 tablet (50 mg total) by mouth once daily.     NARCAN 4 mg/actuation Spry  Generic drug: naloxone     * nitroGLYCERIN 0.2 mg/hr TD PT24 0.2 mg/hr  Commonly known as: NITRODUR  APPLY 1 PATCH TOPICALLY ONCE DAILY TO LEG.     * nitroGLYCERIN 0.4 MG/DOSE TL SPRY 400 mcg/spray spray  Commonly known as: NITROLINGUAL  USE ONE SPRAY UNDER THE TONGUE EVERY 5 MINUTES AS NEEDED FOR CHEST PAIN.  DO NOT EXCEED A TOTAL OF 3 SPRAYS IN 15 MINUTES     ondansetron 4 MG Tbdl  Commonly known as: ZOFRAN-ODT  Take 2 tablets (8 mg total) by mouth every 6 (six) hours as  needed.     pantoprazole 40 MG tablet  Commonly known as: PROTONIX  Take 1 tablet (40 mg total) by mouth once daily.     ticagrelor 90 mg tablet  Commonly known as: BRILINTA  Take 1 tablet (90 mg total) by mouth 2 (two) times a day.     triamcinolone acetonide 0.1% 0.1 % cream  Commonly known as: KENALOG  Apply topically 2 (two) times daily. For legs.         * This list has 2 medication(s) that are the same as other medications prescribed for you. Read the directions carefully, and ask your doctor or other care provider to review them with you.            STOP taking these medications    benazepriL 40 MG tablet  Commonly known as: LOTENSIN     bromfenac 0.07 % Drop  Commonly known as: PROLENSA     CombiVENT RESPIMAT  mcg/actuation inhaler  Generic drug: ipratropium-albuteroL     dextran 70-hypromellose 0.1-0.3 % Drop     FLAXSEED OIL ORAL     fluticasone-salmeterol 250-50 mcg/dose 250-50 mcg/dose diskus inhaler  Commonly known as: ADVAIR DISKUS     INCRUSE ELLIPTA 62.5 mcg/actuation inhalation capsule  Generic drug: umeclidinium     lovastatin 40 MG tablet  Commonly known as: MEVACOR     PRENATAL #115-IRON-FOLIC ACID ORAL     PRESERVISION AREDS-2 ORAL     temazepam 15 mg Cap  Commonly known as: RESTORIL            Indwelling Lines/Drains at time of discharge:   Lines/Drains/Airways     Drain  Duration           Female External Urinary Catheter 02/22/22 1900 27 days                Time spent on the discharge of patient: 36 minutes         Nighat Mcdonald PA-C  Department of Hospital Medicine  [facility] - Telemetry Stepdown ([place]-[facility])

## 2022-03-22 NOTE — ASSESSMENT & PLAN NOTE
- EKG without ST changes, troponin 0.014, will trend  - Recent hx of increasing episodes of angina requiring more frequent Nitroglycerin use and underwent LHC at OSH.   -Recent Left heart cath[02/11/22] showed:  · The RPAV lesion was 100% stenosed.  · The RPDA lesion was 100% stenosed.  · The Prox Cx to Mid Cx lesion was 80% stenosed.  · The 1st LPL lesion was 90% stenosed.  · The Prox RCA-2 lesion was 70% stenosed.  · The Prox RCA-1 lesion was 90% stenosed.  · The Mid LAD-2 lesion was 60% stenosed.  - pt scheduled with cards on 3/17 for follow up appointment, will consult  - interventional cardiology consulted, no plans for angiogram at this time  - continue 81 mg ASA qd, Atorvastatin 80mg qhs, Metoprolol 50 mg XL, Ticagrelor 90mg bid  - EKG prn chest pain  - NTG prn chest pain

## 2022-03-22 NOTE — PROGRESS NOTES
[facility] - Skilled Nursing  Wound Care    Patient Name:  Marisol Kerr   MRN:  [id number]  Date: 3/22/2022  Diagnosis: <principal problem not specified>    History:     Past Medical History:   Diagnosis Date    CAD (coronary artery disease)     Cancer     colon    CHF (congestive heart failure)     Colitis     Colon cancer     COPD (chronic obstructive pulmonary disease)     Decreased hearing     Diabetes mellitus     Diabetes mellitus, type 2     Hypercholesterolemia     Hypertension     Hypoxemia     Insomnia     Obesity     Osteoarthritis        Social History     Socioeconomic History    Marital status:    Tobacco Use    Smoking status: Former Smoker     Packs/day: 3.00     Years: 50.00     Pack years: 150.00     Types: Cigarettes     Start date: 3/30/1964     Quit date: 2006     Years since quittin.0    Smokeless tobacco: Never Used    Tobacco comment: ex smoker 15 years   Substance and Sexual Activity    Alcohol use: Yes    Drug use: No    Sexual activity: Never       Precautions:     Allergies as of 2022 - Reviewed 2022   Allergen Reaction Noted    Iodinated contrast media  06/15/2015    Strawberries [strawberry] Hives and Itching 2016       WO Assessment Details/Treatment   Wound care follow-up  The coccyx/buttocks has a partial thickness skin loss with excoriation to the pablito-wound  The right toes are red with peeling skin. The bilateral shin skin is intact and pink/red dry.   She complains of having diarrhea yesterday and last night requiring frequent cleansing of the skin    Plan:  Coccyx/Buttocks- triad ointment BID/prn   Toes- miconazole powder BID     Nursing to continue care, pressure prevention measures  Wound care to follow-up prn  Recommendations made to primary team for above plan per written report . Orders placed.      22 0850        Altered Skin Integrity 22 Coccyx #1 Partial thickness tissue loss. Shallow open ulcer with a red or pink wound  bed, without slough. Intact or Open/Ruptured Serum-filled blister.   Date First Assessed/Time First Assessed: 03/12/22 2030   Altered Skin Integrity Present on Admission: yes  Location: Coccyx  Wound Number: #1  Description of Altered Skin Integrity: Partial thickness tissue loss. Shallow open ulcer with a red or pink ...   Wound Image    Dressing Appearance Open to air   Drainage Amount None   Drainage Characteristics/Odor No odor   Appearance Red;Moist   Tissue loss description Partial thickness   Periwound Area Intact;Dry;Excoriated   Wound Edges Open   Wound Length (cm) 0.8 cm   Wound Width (cm) 0.4 cm   Wound Depth (cm) 0.2 cm   Wound Volume (cm^3) 0.064 cm^3   Wound Surface Area (cm^2) 0.32 cm^2   Care Cleansed with:;Soap and water;Applied:;Skin Barrier     03/22/2022

## 2022-03-22 NOTE — PLAN OF CARE
[facility] - Skilled Nursing  Initial Discharge Assessment    SW met with patient in room for Discharge Planning Assessment. Pt lives alone.  Pt states s/he was previously functioning at a level equivalent to independent. Patient will have transportation assistance at discharge and receive assistance from her daughter, Fox Kerr.    SW is following this Pt for DC planning needs. There are no identified needs at this time.     Patient's preferred pharmacy is  [facility].      [facility]  Case Management Department  [facility]  [email or web address]       Primary Care Provider: Khadar Dwyer MD    Admission Diagnosis: Chronic diastolic heart failure [I50.32]    Admission Date: 3/18/2022  Expected Discharge Date:     Discharge Barriers Identified: None    Payor: MEDICARE / Plan: MEDICARE PART A & B / Product Type: Government /     Extended Emergency Contact Information  Primary Emergency Contact: Fox Kerr  Address: [address] [place] [address]            [place] [address] [place]  Mobile Phone: [phone]  Relation: Daughter  Preferred language: English   needed? No    Discharge Plan A: Home with family, Home Health  Discharge Plan B: Home, Home Health      [address] [address]  Phone: [phone] Fax: [phone]      Initial Assessment (most recent)     Adult Discharge Assessment - 03/21/22 1416        Discharge Assessment    Assessment Type Discharge Planning Assessment     Confirmed/corrected address, phone number and insurance Yes     Confirmed Demographics Correct on Facesheet     Source of Information health record;patient     Communicated LUZ with patient/caregiver Date not available/Unable to determine     Lives With alone     Do you expect to return to your current living situation? Yes     Prior to hospitilization cognitive status: Unable to Assess      Current cognitive status: Alert/Oriented     Walking or Climbing Stairs Difficulty ambulation difficulty, requires equipment     Dressing/Bathing Difficulty bathing difficulty, requires equipment     Home Accessibility stairs to enter home     Number of Stairs, Main Entrance one     Home Layout Able to live on 1st floor     Equipment Currently Used at Home cane, straight;rollator;shower chair;bedside commode     Readmission within 30 days? No     Patient currently being followed by outpatient case management? No     Do you currently have service(s) that help you manage your care at home? No     Do you take prescription medications? Yes     Do you have prescription coverage? Yes     Do you have any problems affording any of your prescribed medications? No     Is the patient taking medications as prescribed? yes     Who is going to help you get home at discharge? Marisol Kerr (Daughter) [phone]     How do you get to doctors appointments? family or friend will provide     Are you on dialysis? No     Do you take coumadin? No     Discharge Plan A Home with family;Home Health     Discharge Plan B Home;Home Health     DME Needed Upon Discharge  other (see comments)   TBD    Discharge Plan discussed with: Patient     Discharge Barriers Identified None

## 2022-03-22 NOTE — PT/OT/SLP PROGRESS
"Physical Therapy Treatment    Patient Name:  Marisol Kerr   MRN:  [id number]  Admit Date: 3/18/2022  Admitting Diagnosis: Chronic Diastolic Heart Failure  Recent Surgeries: none    General Precautions: Standard, fall   Orthopedic Precautions:N/A   Braces: N/A     Recommendations:     Discharge Recommendations:  home with home health   Level of Assistance Recommended at Discharge: Intermittent assistance   Discharge Equipment Recommendations: walker, rolling   Barriers to discharge: Decreased caregiver support    Assessment:     Marisol Kerr is a 76 y.o. female admitted with a medical diagnosis of Chronic Diastolic Heart Failure .    Treatment session limited secondary to having ongoing diarrhea. Pt was agreeable to limited seated therex. Pt was able to complete 3 exercises before wanting to stop. Pt continues to benefit from therapy to achieve highest level of independence prior to discharge.     Performance deficits affecting function:  weakness, impaired endurance, impaired self care skills, impaired functional mobilty, gait instability, impaired balance, decreased upper extremity function, decreased lower extremity function, pain .    Rehab Potential is fair    Activity Tolerance: Fair    Plan:     Patient to be seen 5 x/week to address the above listed problems via gait training, therapeutic activities, therapeutic exercises, neuromuscular re-education, wheelchair management/training    · Plan of Care Expires: 04/18/22  · Plan of Care Reviewed with: patient    Subjective     "it was a rough night, last night and 2 nights ago". Pt c/o ongoing diarrhea.    Pain/Comfort:  · Pain Rating 1: 0/10 ("feels raw")  · Location - Side 1: Bilateral  · Location - Orientation 1: generalized  · Location 1: groin  · Pain Addressed 1: Reposition, Distraction  · Pain Rating Post-Intervention 1: 0/10    Patient's cultural, spiritual, Denominational conflicts given the current situation:  · no    Objective:     Patient found up in chair " with oxygen (2L) upon PT entry to room.     Therapeutic Activities and Exercises:    Seated BLE therex x15 reps: heel/toe raises, LAQ, and marching.   Patient educated on role of therapy, goals of session, and benefits of out of bed mobility.   Instructed on use of call button and importance of calling nursing staff for assistance with mobility   Questions/concerns addressed within PTA scope of practice  Pt verbalized understanding.    Functional Mobility:  · Pt refused secondary to discomfort from having diarrhea.     AM-PAC 6 CLICK MOBILITY  16    Patient left up in chair with call button in reach.    GOALS:   Multidisciplinary Problems     Physical Therapy Goals        Problem: Physical Therapy Goal    Goal Priority Disciplines Outcome Goal Variances Interventions   Physical Therapy Goal     PT, PT/OT Ongoing, Progressing     Description: Goals to be met by: 22     Patient will increase functional independence with mobility by performin. Supine to sit with Set-up [place]  2. Sit to supine with Set-up [place]  3. Rolling to Left and Right with Supervision.  4. Sit to stand transfer with Supervision  5. Bed to chair transfer with Supervision using Rolling Walker  6. Gait  x 150 feet with Supervision using Rolling Walker.   7. Wheelchair propulsion x75 feet with Supervision using bilateral uppper extremities  8. Ascend/Descend 4 inch curb step with Stand-by Assistance using Rolling Walker.  9. Pt in standing will use a reacher to  an item from the floor w/ a RW and SPV                     Time Tracking:     PT Received On: 22  PT Total Time (min):   14    Billable Minutes: Therapeutic Exercise 14    Treatment Type: Treatment  PT/PTA: PTA     PTA Visit Number: 2     2022

## 2022-03-22 NOTE — PT/OT/SLP PROGRESS
Occupational Therapy   Treatment    Name: Marisol Kerr  MRN: [id number]  Admit Date: 3/18/2022  Admitting Diagnosis:  Chronic Diastolic Heart Failure    General Precautions: Standard, fall   Orthopedic Precautions:N/A   Braces:       Recommendations:     Discharge Recommendations: home with home health  Level of Assistance Recommended at Discharge: Intermittent supervision  Discharge Equipment Recommendations:  none  Barriers to discharge:  Decreased caregiver support    Assessment:     Marisol Kerr is a 76 y.o. female with a medical diagnosis of Chronic Diastolic Heart Failure  She presents with  Performance deficits affecting function are  weakness, impaired endurance, impaired sensation, impaired self care skills, impaired functional mobilty, gait instability, impaired balance, decreased coordination, decreased upper extremity function, decreased lower extremity function, pain, decreased safety awareness, decreased ROM, impaired skin, impaired cardiopulmonary response to activity.   Pt. Was cooperative and participated well with session on this day. Pt. With mild exertion thorough task on this day mini breaks in between Pt  continues to demonstrate levels of physical deficits with  functional indep with daily management activities tasks, selfcare skills with balance,  functional mobility, UB strength and endurance. Pt. Will continue to benefit from continued OT to progress towards goals     Rehab Potential is fair    Activity tolerance:  Fair    Plan:     Patient to be seen 6 x/week to address the above listed problems via self-care/home management, therapeutic activities, therapeutic exercises    · Plan of Care Expires: 04/18/22  · Plan of Care Reviewed with: patient    Subjective     Communicated with: nsg and Pt. prior to session. I had Diarrhea all night     Pain/Comfort:  · Pain Rating 1: 0/10  · Pain Rating Post-Intervention 1: 0/10       Patient's cultural, spiritual, Mu-ism conflicts given the current  situation:  no    Objective:     Patient found up in chair with  oxygen upon OT entry to room.    Functional Mobility/Transfers:  · Patient completed Sit <> Stand Transfer with contact guard assistance  with  rolling walker     Activities of Daily Living:  · Grooming: supervision seaated at sink level with grooming needs  · Bathing: minimum assistance standing/seated at sink level with grooming needs  · Upper Body Dressing: minimum assistance to doff/beth pull over shirt  · Lower Body Dressing: minimum assistance to beth pants seated and to manage over hips instace with RW for balance  · Toileting: minimum assistance with cleaning and clothing  And (A) with diaper mangement     [facility] 6 Click ADL:  18    Treatment & Education:  Pt edu on role of OT, POC, safety when performing self care tasks , benefit of performing OOB activity, and safety when performing functional transfers and mobility management for preparation with goals to progress towards next level of care    Patient left up in chair with all lines intact and call button in reachEducation:      GOALS:   Multidisciplinary Problems     Occupational Therapy Goals        Problem: Occupational Therapy Goal    Goal Priority Disciplines Outcome Interventions   Occupational Therapy Goal     OT, PT/OT Ongoing, Progressing    Description: Goals to be met by: 04/01/22     Patient will increase functional independence with ADLs by performing:    UE Dressing with Modified [place].  LE Dressing with Modified [place] using AD.  Grooming while standing at sink with Modified [place].  Toileting from bedside commode with Modified [place] for hygiene and clothing management.   Bathing from  shower chair/bench with Minimal Assistance.  Toilet transfer to bedside commode with Modified [place].  Upper extremity exercise program 3x15 reps per handout, with supervision to increase UE strength/endurance for improved func mobility skills  Stand during  functional task for 10' with S in preparation for increased independence during standing ADLs..                     Time Tracking:     OT Date of Treatment: OT Date of Treatment: 03/21/22  OT Total Time (min):      Billable Minutes:Self Care/Home Management 43    3/22/2022

## 2022-03-22 NOTE — INTERVAL H&P NOTE
The patient has been examined and the H&P has been reviewed:    I concur with the findings and changes have been noted since the H&P was written: Please see interval progress note by Silvia Clemons NP. Ms. Kerr was sent to ED for evaluation of chest pain. She has known coronary disease. Interventional Cardiology recommended to follow up after SNF discharge for intervention. She will continue GDMT. Continue ISMN and titrate up if needed.         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]  Yes     Chronic    Primary osteoarthritis of both hips [M16.0]  Yes    Chronic respiratory failure with hypoxia [J96.11]  Yes    CKD (chronic kidney disease), stage III [N18.30]  Yes    Diabetic peripheral neuropathy [E11.42]  Yes    Coronary artery disease, multivessel with history of previous PCI [I25.10]  Yes    DM type 2, controlled, with complication [E11.8]  Yes    Gastroesophageal reflux disease without esophagitis [K21.9]  Yes    Hypertension [I10]  Yes    Coronary atherosclerosis [I25.10]  Yes    Hypercholesterolemia [E78.00]  Yes      Resolved Hospital Problems   No resolved problems to display.

## 2022-03-23 PROCEDURE — 25000003 PHARM REV CODE 250: Performed by: NURSE PRACTITIONER

## 2022-03-23 PROCEDURE — 97110 THERAPEUTIC EXERCISES: CPT

## 2022-03-23 PROCEDURE — 94761 N-INVAS EAR/PLS OXIMETRY MLT: CPT

## 2022-03-23 PROCEDURE — 25000003 PHARM REV CODE 250: Performed by: HOSPITALIST

## 2022-03-23 PROCEDURE — 99900035 HC TECH TIME PER 15 MIN (STAT)

## 2022-03-23 PROCEDURE — 11000004 HC SNF PRIVATE

## 2022-03-23 PROCEDURE — 27000221 HC OXYGEN, UP TO 24 HOURS

## 2022-03-23 PROCEDURE — 97535 SELF CARE MNGMENT TRAINING: CPT

## 2022-03-23 RX ADMIN — FERROUS SULFATE TAB 325 MG (65 MG ELEMENTAL FE) 1 EACH: 325 (65 FE) TAB at 08:03

## 2022-03-23 RX ADMIN — TRIAMCINOLONE ACETONIDE: 1 CREAM TOPICAL at 08:03

## 2022-03-23 RX ADMIN — TICAGRELOR 90 MG: 90 TABLET ORAL at 10:03

## 2022-03-23 RX ADMIN — AMLODIPINE BESYLATE 10 MG: 10 TABLET ORAL at 08:03

## 2022-03-23 RX ADMIN — MICONAZOLE NITRATE 2 % TOPICAL POWDER: at 08:03

## 2022-03-23 RX ADMIN — Medication 100 MG: at 08:03

## 2022-03-23 RX ADMIN — TIOTROPIUM BROMIDE INHALATION SPRAY 2 PUFF: 3.12 SPRAY, METERED RESPIRATORY (INHALATION) at 09:03

## 2022-03-23 RX ADMIN — SENNOSIDES AND DOCUSATE SODIUM 1 TABLET: 50; 8.6 TABLET ORAL at 10:03

## 2022-03-23 RX ADMIN — TICAGRELOR 90 MG: 90 TABLET ORAL at 08:03

## 2022-03-23 RX ADMIN — ASPIRIN 81 MG: 81 TABLET, COATED ORAL at 08:03

## 2022-03-23 RX ADMIN — ISOSORBIDE MONONITRATE 30 MG: 30 TABLET, EXTENDED RELEASE ORAL at 08:03

## 2022-03-23 RX ADMIN — METOPROLOL SUCCINATE 50 MG: 50 TABLET, EXTENDED RELEASE ORAL at 08:03

## 2022-03-23 RX ADMIN — FUROSEMIDE 40 MG: 40 TABLET ORAL at 08:03

## 2022-03-23 RX ADMIN — FLUTICASONE FUROATE AND VILANTEROL TRIFENATATE 1 PUFF: 100; 25 POWDER RESPIRATORY (INHALATION) at 08:03

## 2022-03-23 RX ADMIN — TRIAMCINOLONE ACETONIDE: 1 CREAM TOPICAL at 09:03

## 2022-03-23 RX ADMIN — ATORVASTATIN CALCIUM 80 MG: 20 TABLET, FILM COATED ORAL at 10:03

## 2022-03-23 RX ADMIN — MICONAZOLE NITRATE 2 % TOPICAL POWDER: at 09:03

## 2022-03-23 RX ADMIN — GABAPENTIN 100 MG: 100 CAPSULE ORAL at 10:03

## 2022-03-23 RX ADMIN — Medication 1 CAPSULE: at 08:03

## 2022-03-23 RX ADMIN — PANTOPRAZOLE SODIUM 40 MG: 40 TABLET, DELAYED RELEASE ORAL at 08:03

## 2022-03-23 NOTE — PLAN OF CARE
Problem: Occupational Therapy Goal  Goal: Occupational Therapy Goal  Description: Goals to be met by: 04/01/22     Patient will increase functional independence with ADLs by performing:    UE Dressing with Modified [place].  LE Dressing with Modified [place] using AD.  Grooming while standing at sink with Modified [place].  Toileting from bedside commode with Modified [place] for hygiene and clothing management.   Bathing from  shower chair/bench with Minimal Assistance.  Toilet transfer to bedside commode with Modified [place].  Upper extremity exercise program 3x15 reps per handout, with supervision to increase UE strength/endurance for improved func mobility skills. Ongoing  Stand during functional task for 10' with S in preparation for increased independence during standing ADLs..    Outcome: Ongoing, Progressing

## 2022-03-23 NOTE — PT/OT/SLP PROGRESS
Occupational Therapy   Treatment    Name: Marisol Kerr  MRN: [id number]  Admit Date: 3/18/2022  Admitting Diagnosis:  Chronic diastolic heart failure     General Precautions: Standard, fall   Orthopedic Precautions:N/A   Braces: N/A     Recommendations:     Discharge Recommendations: home with home health  Level of Assistance Recommended at Discharge: Intermittent supervision  Discharge Equipment Recommendations:  none  Barriers to discharge:  Decreased caregiver support    Assessment:     Marisol Kerr is a 76 y.o. female with a medical diagnosis of Chronic diastolic heart failure. Performance deficits affecting function are weakness, impaired endurance, impaired self care skills, gait instability, decreased safety awareness, pain, impaired cardiopulmonary response to activity. Patient tolerated treatment well and without incident. Pt requires increased time to complete functional activities/mobility secondary to decreased endurance. Pt requires SBA for functional mobility and toilet t/f with RW. Pt requires verbal cues for safety awareness and RW management during functional mobility. Pt would benefit from continued skilled OT to increase independence, safety and endurance needed for ADLs.    Rehab Potential is good    Activity tolerance:  Fair    Plan:     Patient to be seen 6 x/week to address the above listed problems via self-care/home management, therapeutic activities, therapeutic exercises    · Plan of Care Expires: 04/18/22  · Plan of Care Reviewed with: patient    Subjective     Communicated with: nurse prior to session.    Pain/Comfort:  Pain Rating 1: 6/10  Location - Side 1: Left  Location - Orientation 1: generalized  Location 1: shoulder  Pain Addressed 1: Reposition, Distraction  Pain Rating Post-Intervention 1: 6/10    Patient's cultural, spiritual, Jainism conflicts given the current situation:  no    Objective:     Patient found up in chair with oxygen upon OT entry to room.    Bed Mobility:     · Pt sitting in bedside chair upon OT entry.    Functional Mobility/Transfers:  · Patient completed Sit <> Stand Transfer with stand by assistance  with  rolling walker   · Patient completed Toilet Transfer Step Transfer technique with stand by assistance with  rolling walker  · Functional Mobility: Pt walked to/from bathroom for toileting and grooming standing at the sink with SBA and RW. Verbal cues required for safety and RW management.     Activities of Daily Living:  · Grooming: stand by assistance for grooming standing sinkside.   · Toileting: stand by assistance for pt to complete pablito hygiene sitting on commode and to pull pants over hips in standing.     [facility] 6 Click ADL: 19    OT Exercises: UE Ergometer performed for 15 min at min resistance to increase endurance and strength needed for ADLs.     Treatment & Education:  Pt edu on POC, safety when performing self care tasks and safety when performing functional transfers and mobility.  - White board updated  - Self care tasks completed-- as noted above       Patient left up in chair with all lines intact and call button in reachEducation:      GOALS:   Multidisciplinary Problems     Occupational Therapy Goals        Problem: Occupational Therapy Goal    Goal Priority Disciplines Outcome Interventions   Occupational Therapy Goal     OT, PT/OT Ongoing, Progressing    Description: Goals to be met by: 04/01/22     Patient will increase functional independence with ADLs by performing:    UE Dressing with Modified [place].  LE Dressing with Modified [place] using AD.  Grooming while standing at sink with Modified [place].  Toileting from bedside commode with Modified [place] for hygiene and clothing management.   Bathing from  shower chair/bench with Minimal Assistance.  Toilet transfer to bedside commode with Modified [place].  Upper extremity exercise program 3x15 reps per handout, with supervision to increase UE strength/endurance  for improved [facility] mobility skills. Ongoing  Stand during functional task for 10' with S in preparation for increased independence during standing ADLs..                     Time Tracking:     OT Date of Treatment: OT Date of Treatment: 03/23/22  OT Total Time (min): Total Time (min): 29 min    Billable Minutes:Self Care/Home Management 14  Therapeutic Exercise 15    3/23/2022

## 2022-03-24 LAB
ALBUMIN SERPL BCP-MCNC: 2.9 G/DL (ref 3.5–5.2)
ALP SERPL-CCNC: 96 U/L (ref 55–135)
ALT SERPL W/O P-5'-P-CCNC: 29 U/L (ref 10–44)
ANION GAP SERPL CALC-SCNC: 9 MMOL/L (ref 8–16)
AST SERPL-CCNC: 22 U/L (ref 10–40)
BASOPHILS # BLD AUTO: 0.03 K/UL (ref 0–0.2)
BASOPHILS NFR BLD: 0.5 % (ref 0–1.9)
BILIRUB DIRECT SERPL-MCNC: 0.2 MG/DL (ref 0.1–0.3)
BILIRUB SERPL-MCNC: 0.5 MG/DL (ref 0.1–1)
BUN SERPL-MCNC: 68 MG/DL (ref 8–23)
CALCIUM SERPL-MCNC: 8.8 MG/DL (ref 8.7–10.5)
CHLORIDE SERPL-SCNC: 105 MMOL/L (ref 95–110)
CO2 SERPL-SCNC: 25 MMOL/L (ref 23–29)
CREAT SERPL-MCNC: 1.8 MG/DL (ref 0.5–1.4)
DIFFERENTIAL METHOD: ABNORMAL
EOSINOPHIL # BLD AUTO: 0.1 K/UL (ref 0–0.5)
EOSINOPHIL NFR BLD: 2.1 % (ref 0–8)
ERYTHROCYTE [DISTWIDTH] IN BLOOD BY AUTOMATED COUNT: 14.5 % (ref 11.5–14.5)
EST. GFR  (AFRICAN AMERICAN): 31.5 ML/MIN/1.73 M^2
EST. GFR  (NON AFRICAN AMERICAN): 27.3 ML/MIN/1.73 M^2
GLUCOSE SERPL-MCNC: 93 MG/DL (ref 70–110)
HCT VFR BLD AUTO: 27.7 % (ref 37–48.5)
HGB BLD-MCNC: 9 G/DL (ref 12–16)
IMM GRANULOCYTES # BLD AUTO: 0.03 K/UL (ref 0–0.04)
IMM GRANULOCYTES NFR BLD AUTO: 0.5 % (ref 0–0.5)
LYMPHOCYTES # BLD AUTO: 1.4 K/UL (ref 1–4.8)
LYMPHOCYTES NFR BLD: 21.7 % (ref 18–48)
MAGNESIUM SERPL-MCNC: 1.4 MG/DL (ref 1.6–2.6)
MCH RBC QN AUTO: 29.5 PG (ref 27–31)
MCHC RBC AUTO-ENTMCNC: 32.5 G/DL (ref 32–36)
MCV RBC AUTO: 91 FL (ref 82–98)
MONOCYTES # BLD AUTO: 0.6 K/UL (ref 0.3–1)
MONOCYTES NFR BLD: 8.3 % (ref 4–15)
NEUTROPHILS # BLD AUTO: 4.4 K/UL (ref 1.8–7.7)
NEUTROPHILS NFR BLD: 66.9 % (ref 38–73)
NRBC BLD-RTO: 0 /100 WBC
PHOSPHATE SERPL-MCNC: 3.4 MG/DL (ref 2.7–4.5)
PLATELET # BLD AUTO: 133 K/UL (ref 150–450)
PMV BLD AUTO: 12.7 FL (ref 9.2–12.9)
POTASSIUM SERPL-SCNC: 3.8 MMOL/L (ref 3.5–5.1)
PROT SERPL-MCNC: 5.5 G/DL (ref 6–8.4)
RBC # BLD AUTO: 3.05 M/UL (ref 4–5.4)
SODIUM SERPL-SCNC: 139 MMOL/L (ref 136–145)
WBC # BLD AUTO: 6.59 K/UL (ref 3.9–12.7)

## 2022-03-24 PROCEDURE — 80076 HEPATIC FUNCTION PANEL: CPT | Performed by: HOSPITALIST

## 2022-03-24 PROCEDURE — 83735 ASSAY OF MAGNESIUM: CPT | Performed by: HOSPITALIST

## 2022-03-24 PROCEDURE — 36415 COLL VENOUS BLD VENIPUNCTURE: CPT | Performed by: HOSPITALIST

## 2022-03-24 PROCEDURE — 80048 BASIC METABOLIC PNL TOTAL CA: CPT | Performed by: HOSPITALIST

## 2022-03-24 PROCEDURE — 97110 THERAPEUTIC EXERCISES: CPT | Mod: CQ

## 2022-03-24 PROCEDURE — 85025 COMPLETE CBC W/AUTO DIFF WBC: CPT | Performed by: HOSPITALIST

## 2022-03-24 PROCEDURE — 99900035 HC TECH TIME PER 15 MIN (STAT)

## 2022-03-24 PROCEDURE — 11000004 HC SNF PRIVATE

## 2022-03-24 PROCEDURE — 25000003 PHARM REV CODE 250: Performed by: HOSPITALIST

## 2022-03-24 PROCEDURE — 25000003 PHARM REV CODE 250: Performed by: NURSE PRACTITIONER

## 2022-03-24 PROCEDURE — 97116 GAIT TRAINING THERAPY: CPT | Mod: CQ

## 2022-03-24 PROCEDURE — 94761 N-INVAS EAR/PLS OXIMETRY MLT: CPT

## 2022-03-24 PROCEDURE — 27000221 HC OXYGEN, UP TO 24 HOURS

## 2022-03-24 PROCEDURE — 84100 ASSAY OF PHOSPHORUS: CPT | Performed by: HOSPITALIST

## 2022-03-24 RX ORDER — LANOLIN ALCOHOL/MO/W.PET/CERES
400 CREAM (GRAM) TOPICAL 2 TIMES DAILY
Status: COMPLETED | OUTPATIENT
Start: 2022-03-24 | End: 2022-03-26

## 2022-03-24 RX ADMIN — FLUTICASONE FUROATE AND VILANTEROL TRIFENATATE 1 PUFF: 100; 25 POWDER RESPIRATORY (INHALATION) at 08:03

## 2022-03-24 RX ADMIN — FUROSEMIDE 40 MG: 40 TABLET ORAL at 08:03

## 2022-03-24 RX ADMIN — MICONAZOLE NITRATE 2 % TOPICAL POWDER: at 09:03

## 2022-03-24 RX ADMIN — Medication 400 MG: at 09:03

## 2022-03-24 RX ADMIN — TRIAMCINOLONE ACETONIDE: 1 CREAM TOPICAL at 08:03

## 2022-03-24 RX ADMIN — SENNOSIDES AND DOCUSATE SODIUM 1 TABLET: 50; 8.6 TABLET ORAL at 09:03

## 2022-03-24 RX ADMIN — FERROUS SULFATE TAB 325 MG (65 MG ELEMENTAL FE) 1 EACH: 325 (65 FE) TAB at 08:03

## 2022-03-24 RX ADMIN — MICONAZOLE NITRATE 2 % TOPICAL POWDER: at 08:03

## 2022-03-24 RX ADMIN — ASPIRIN 81 MG: 81 TABLET, COATED ORAL at 08:03

## 2022-03-24 RX ADMIN — TIOTROPIUM BROMIDE INHALATION SPRAY 2 PUFF: 3.12 SPRAY, METERED RESPIRATORY (INHALATION) at 08:03

## 2022-03-24 RX ADMIN — TICAGRELOR 90 MG: 90 TABLET ORAL at 08:03

## 2022-03-24 RX ADMIN — Medication 100 MG: at 08:03

## 2022-03-24 RX ADMIN — ATORVASTATIN CALCIUM 80 MG: 20 TABLET, FILM COATED ORAL at 09:03

## 2022-03-24 RX ADMIN — Medication 1 CAPSULE: at 08:03

## 2022-03-24 RX ADMIN — ISOSORBIDE MONONITRATE 30 MG: 30 TABLET, EXTENDED RELEASE ORAL at 08:03

## 2022-03-24 RX ADMIN — PANTOPRAZOLE SODIUM 40 MG: 40 TABLET, DELAYED RELEASE ORAL at 08:03

## 2022-03-24 RX ADMIN — METOPROLOL SUCCINATE 50 MG: 50 TABLET, EXTENDED RELEASE ORAL at 08:03

## 2022-03-24 RX ADMIN — GABAPENTIN 100 MG: 100 CAPSULE ORAL at 09:03

## 2022-03-24 RX ADMIN — AMLODIPINE BESYLATE 10 MG: 10 TABLET ORAL at 08:03

## 2022-03-24 RX ADMIN — TICAGRELOR 90 MG: 90 TABLET ORAL at 09:03

## 2022-03-24 RX ADMIN — TRIAMCINOLONE ACETONIDE: 1 CREAM TOPICAL at 09:03

## 2022-03-24 NOTE — PROGRESS NOTES
[facility]                                  Skilled Nursing Facility                   Progress Note     Admit Date: 3/18/2022  LUZ  TBD  Principal Problem:  NSTEMI (non-ST elevated myocardial infarction)   HPI obtained from patient interview and chart review     Chief Complaint: Re-evaluation of medical treatment and therapy status: Lab review, hypomagnesemia    HPI:   Ms Kerr is a 76 year old female with PMHx of DM2, HTN, HLD, CKD, COPD on home O2 who presents SNF following hospitalization for NSTEMI.  Admission to SNF for secondary to weakness and debility.    Interval history: All of today's labs reviewed and are listed below.  Mag 1.4.  24 hr vital sign ranges listed below.  Patient denies shortness of breath, abdominal discomfort, nausea, or vomiting.  Patient reports an adequate appetite.  Patient denies dysuria.  Patient denies episode of diarrhea today.  Patient progessing with PT/OT- Gait: pt ambulated 2x 70ft with RW and CGA-SBA and w/c following and oxygen in tow. Pt demonstrated mildly unsteady, swing through gait pattern with forward flexed posture, decrease esther, and decrease step length. 1 standing rest break and no LOB occurred. Verbal cues for upright and keep RW close. Pt report feeling SOB following gait training, but pt was able to easily recover after seated rest break. Continuing to follow and treat all acute and chronic conditions.    Past Medical History: Patient has a past medical history of CAD (coronary artery disease), Cancer, CHF (congestive heart failure), Colitis, Colon cancer, COPD (chronic obstructive pulmonary disease), Decreased hearing, Diabetes mellitus, Diabetes mellitus, type 2, Hypercholesterolemia, Hypertension, Hypoxemia, Insomnia, Obesity, and Osteoarthritis.    Past Surgical History: Patient has a past surgical history that includes External ear surgery; Colectomy; Cholecystectomy; Flexible  sigmoidoscopy (N/A, 9/19/2019); Eye surgery; Hysterectomy; Coronary stent placement; Cardiac catheterization; Coronary angioplasty; and ANGIOGRAM, CORONARY, WITH LEFT HEART CATHETERIZATION (N/A, 2/10/2022).    Social History: Patient reports that she quit smoking about 16 years ago. Her smoking use included cigarettes. She started smoking about 58 years ago. She has a 150.00 pack-year smoking history. She has never used smokeless tobacco. She reports current alcohol use. She reports that she does not use drugs.    Family History: family history includes Febrile seizures in her mother; Heart failure in her father.    Allergies: Patient is allergic to iodinated contrast media and strawberries [strawberry].    ROS  Constitutional: Negative for fever   Eyes: Negative for blurred vision, double vision   Respiratory: Negative for cough, shortness of breath   Cardiovascular: Negative for chest pain, palpitations, and leg swelling.   Gastrointestinal: Negative for abdominal pain, constipation, diarrhea, nausea, vomiting.   Genitourinary:  Negative for dysuria, frequency   Musculoskeletal:  + generalized weakness.  +left hip pain, buttocks pain, left shoulder pain  Skin: Negative for itching and rash.   Neurological: Negative for dizziness, headaches.   Psychiatric/Behavioral: Negative for depression. The patient is not nervous/anxious.      24 hour Vital Sign Range   Temp:  [97.4 °F (36.3 °C)-97.9 °F (36.6 °C)]   Pulse:  [62-88]   Resp:  [18]   BP: (127-136)/(60-64)   SpO2:  [97 %-99 %]     PEx  Constitutional: Patient appears debilitated.  No distress noted  HENT:   Head: Normocephalic and atraumatic.   Eyes: Pupils are equal, round  Neck: Normal range of motion. Neck supple.   Cardiovascular: Normal rate, regular rhythm and normal heart sounds.    Pulmonary/Chest: Effort normal and breath sounds are clear with diminished bases.  Supplemental oxygen in progress  Abdominal: Soft. Bowel sounds are normal.   Musculoskeletal:  Normal range of motion.   Neurological: Alert and oriented to person, place, and time.   Psychiatric: Normal mood and affect. Behavior is normal.   Skin: Skin is warm and dry.  PVD changes to bilateral lower extremities.  Skin breakdown to buttocks    Recent Labs   Lab 03/24/22  0511      K 3.8      CO2 25   BUN 68*   CREATININE 1.8*   MG 1.4*       Recent Labs   Lab 03/24/22  0511   WBC 6.59   RBC 3.05*   HGB 9.0*   HCT 27.7*   *   MCV 91   MCH 29.5   MCHC 32.5         Assessment and Plan:    Hypomagnesemia  - initiated magnesium oxide 400 mg BID x2 days    CKD (chronic kidney disease), stage III  - Baseline sCr 1.5- 1.8   - stable, continue to hold lisinopril as BP's are low to normal and patient with hyperkalemia, continue to monitor twice weekly BMPs, avoid nephrotoxic agents, renally dose medications when appropriate    Left hip pain  - x-ray completed, showing severe osteoarthritis  - will likely need orthopedic evaluation after discharge from SNF  - initiated Voltaren gel 2 g BID  - also check uric acid level to ensure not gout    Acute on chronic diastolic congestive heart failure  - TTE notable for Grade II left ventricular diastolic dysfunction, EF 65%  - admitted with Lasix 40 mg BID, lisinopril 10 mg daily  - Cardiac diet with Fluid restriction at 1.5L with strict I/Os and daily STANDING weights  - continue Lasix 40 mg daily  - checking BMP tomorrow    Partial thickness tissue loss  - triad to gluteal cleft, ordered for wound care consult    NSTEMI (non-ST elevated myocardial infarction)  Coronary artery disease,  multivessel with history of previous PCI  - Recent hx of increasing episodes of angina requiring more frequent Nitroglycerin use and underwent LHC at OSH.   -Recent Left heart cath[02/11/22] showed:  · The RPAV lesion was 100% stenosed.  · The RPDA lesion was 100% stenosed.  · The Prox Cx to Mid Cx lesion was 80% stenosed.  · The 1st LPL lesion was 90% stenosed.  · The Prox  RCA-2 lesion was 70% stenosed.  · The Prox RCA-1 lesion was 90% stenosed.  · The Mid LAD-2 lesion was 60% stenosed.  - continue 81 mg ASA qd, Atorvastatin 80mg qhs, Metoprolol 50 mg XL, Ticagrelor 90mg bid  - LHC deferred given rectus sheath hematoma. Plan for IC intervention 1-2 weeks after admission for outpatient.  Coronary atherosclerosis  - 3/8 required dose of nitroglycerin today, symptom resolve min after 1 administration    Essential hypertension, benign  - home regimen with amlodipine 5 mg daily, benazepril 40 mg daily  - continue amlodipine 10 mg daily, lisinopril 10 mg daily, metoprolol XL 50 mg daily  - 3/15 discontinuing lisinopril for low to normal BP's, hyperkalemia    COPD/emphysema  - Known history of severe COPD (GOLD IV D PFT 2020). On home oxygen (2-3L)  - Home medications: albuterol (VENTOLIN HFA) 90 mcg/actuation inhaler, ipratropium-albuteroL (COMBIVENT RESPIMAT)  mcg/actuation inhaler,  umeclidinium (INCRUSE ELLIPTA) 62.5 mcg/actuation inhalation capsule, fluticasone-salmeterol diskus inhaler 250-50 mcg  - continue Breo and Spiriva inhaler   - DuoNebs PRN    DM type 2, controlled, with complication  - last A1c 5.2 on 01/27/2022  - diabetic diet, Accu-Cheks AC/HS  - continue LDSSI PRN     Gastroesophageal reflux disease without esophagitis  - continue Protonix 40 mg daily    Hypercholesterolemia  - continue Atorvastatin 40mg    Obesity  - BMI currently 33.94  - RD following, weight loss encouraged    Rectus sheath hematoma  - Patient had severe abdominal pain the day before with a mass on the abdomen. Patient was unable to have a BM yesterday. KUB was drawn and showed a large stool burden. Continued bowel regimen but pain was persistent. CT abdomen without contrast and lactic drawn. CT abdomen showed rectal sheath hematoma extending to the pelvis. IR consulted and recommended CTA of the abdomen to identify bleeding vessel. Due to kidney function, held off on CTA for concerns of contrast  induced nephropathy as patient had a Alfredo score of 16 with 100cc of contrast, 15 with 75 cc contrast. IR consulted and following. Hgb went from 13 on admission --> 10.6 --> 9.9 --> 8.9. hematoma was expanding. IR performed embolization of inferior epigastric and collateral vessels.   - continue to monitor abdominal mass     Venous stasis  - Chronic history of venous stasis of bilateral lower extremities limited to breakdown of skin without varicose veins  - Patient denies increase in leg swelling over her baseline  - wound care RN following    Vitamin-D deficiency  - continue ergocalciferol 15437 units weekly    Debility   - Continue with PT/OT for gait training and strengthening and restoration of ADL's   - Encourage mobility, OOB in chair, and early ambulation as appropriate  - Fall precautions   - Monitor for bowel and bladder dysfunction  - Monitor for and prevent skin breakdown and pressure ulcers  - Continue DVT prophylaxis with ASA/ticagrelor, frequent ambulation         Anticipate disposition:  Home with home health      Follow-up needed during SNF stay- interventional cardiology (3/17)    Follow-up needed after discharge from SNF: PCP,     Future Appointments   Date Time Provider Department Center   4/7/2022  1:40 PM Scott Chappell MD [facility] JEYSON Muniz [facility]   4/27/2022 11:20 AM Antonieta Marquez NP [facility]       Natalie Clemons NP  Department of Hospital Medicine   [facility]     DOS: 3/24/2022       Patient note was created using MModal Dictation.  Any errors in syntax or even information may not have been identified and edited on initial review prior to signing this note.

## 2022-03-24 NOTE — PLAN OF CARE
Problem: Adult Inpatient Plan of Care  Goal: Plan of Care Review  Outcome: Ongoing, Progressing  Goal: Patient-Specific Goal (Individualized)  Outcome: Ongoing, Progressing  Goal: Absence of Hospital-Acquired Illness or Injury  Outcome: Ongoing, Progressing  Goal: Optimal Comfort and Wellbeing  Outcome: Ongoing, Progressing  Goal: Readiness for Transition of Care  Outcome: Ongoing, Progressing     Problem: Diabetes Comorbidity  Goal: Blood Glucose Level Within Targeted Range  Outcome: Ongoing, Progressing     Problem: Impaired Wound Healing  Goal: Optimal Wound Healing  Outcome: Ongoing, Progressing     Problem: Skin Injury Risk Increased  Goal: Skin Health and Integrity  Outcome: Ongoing, Progressing     Problem: Fall Injury Risk  Goal: Absence of Fall and Fall-Related Injury  Outcome: Ongoing, Progressing     Problem: Adult Inpatient Plan of Care  Goal: Plan of Care Review  Outcome: Ongoing, Progressing  Goal: Patient-Specific Goal (Individualized)  Outcome: Ongoing, Progressing  Goal: Absence of Hospital-Acquired Illness or Injury  Outcome: Ongoing, Progressing  Goal: Optimal Comfort and Wellbeing  Outcome: Ongoing, Progressing  Goal: Readiness for Transition of Care  Outcome: Ongoing, Progressing     Problem: Diabetes Comorbidity  Goal: Blood Glucose Level Within Targeted Range  Outcome: Ongoing, Progressing     Problem: Impaired Wound Healing  Goal: Optimal Wound Healing  Outcome: Ongoing, Progressing     Problem: Skin Injury Risk Increased  Goal: Skin Health and Integrity  Outcome: Ongoing, Progressing     Problem: Fall Injury Risk  Goal: Absence of Fall and Fall-Related Injury  Outcome: Ongoing, Progressing     Problem: Adult Inpatient Plan of Care  Goal: Plan of Care Review  Outcome: Ongoing, Progressing  Goal: Patient-Specific Goal (Individualized)  Outcome: Ongoing, Progressing  Goal: Absence of Hospital-Acquired Illness or Injury  Outcome: Ongoing, Progressing  Goal: Optimal Comfort and  Wellbeing  Outcome: Ongoing, Progressing  Goal: Readiness for Transition of Care  Outcome: Ongoing, Progressing     Problem: Diabetes Comorbidity  Goal: Blood Glucose Level Within Targeted Range  Outcome: Ongoing, Progressing     Problem: Impaired Wound Healing  Goal: Optimal Wound Healing  Outcome: Ongoing, Progressing     Problem: Skin Injury Risk Increased  Goal: Skin Health and Integrity  Outcome: Ongoing, Progressing     Problem: Fall Injury Risk  Goal: Absence of Fall and Fall-Related Injury  Outcome: Ongoing, Progressing

## 2022-03-24 NOTE — PLAN OF CARE
Interdisciplinary team, Radha Rodriguez, RN Charge Nurse, García Marina, Unit Director, Alisa Nuñez RN MDS Coordinator, Eliane Foster PT, Rehab Supervisor, Marisol Elaine LM, and Eliane Long OT, Activities, spoke to patient for care plan conference, weekly status update, and therapy progress update. Tentative discharge date set for 4/1/22.

## 2022-03-24 NOTE — PT/OT/SLP PROGRESS
"Physical Therapy Treatment    Patient Name:  Marisol Kerr   MRN:  [id number]  Admit Date: 3/18/2022  Admitting Diagnosis: Chronic Diastolic Heart Failure  Recent Surgeries: none    General Precautions: Standard, fall   Orthopedic Precautions:N/A   Braces: N/A     Recommendations:     Discharge Recommendations:  home with home health   Level of Assistance Recommended at Discharge: Intermittent assistance   Discharge Equipment Recommendations: walker, rolling   Barriers to discharge: Decreased caregiver support    Assessment:     Marisol Kerr is a 76 y.o. female admitted with a medical diagnosis of Chronic Diastolic Heart Failure. .    Pt tolerated today's therapy session fairly well. Pt was motivated to ambulate more, 2x 70ft with RW and CGA-RW and oxygen in tow. Pt required occasional rest breaks to complete therex. Pt is progressing well and continues to benefit from therapy to achieve highest level of independence prior to discharge.     Performance deficits affecting function:  weakness, impaired endurance, impaired self care skills, impaired functional mobilty, gait instability, impaired balance, decreased upper extremity function, decreased lower extremity function, pain .    Rehab Potential is good    Activity Tolerance: Good    Plan:     Patient to be seen 5 x/week to address the above listed problems via gait training, therapeutic activities, therapeutic exercises, neuromuscular re-education, wheelchair management/training    · Plan of Care Expires: 04/18/22  · Plan of Care Reviewed with: patient    Subjective     "I'm over [the diarrhea]" "i'm not taking stool softer ever again"    Pain/Comfort:  · Pain Rating 1:  (pt states "just my regular everyday pain, no new pain")    Patient's cultural, spiritual, [employer] conflicts given the current situation:  · no    Objective:     Patient found up in chair with oxygen (2L) upon PT entry to room.     Therapeutic Activities and Exercises:    Seated BLE therex x15, " then x10 reps: heel/toe raises, LAQ, marching, and heel slides   o Pt required rest breaks inbetween each exercise.  · Patient educated on role of therapy, goals of session, and benefits of out of bed mobility.   · Instructed on use of call button and importance of calling nursing staff for assistance with mobility   · Questions/concerns addressed within PTA scope of practice   Pt verbalized understanding.    Functional Mobility:  · Transfers:     · Sit to Stand:  stand by assistance with rolling walker.   · Chair to Wheelchair: contact guard assistance with  no AD  using  Stand Pivot  · Gait: pt ambulated 2x 70ft with RW and CGA-SBA and w/c following and oxygen in tow. Pt demonstrated mildly unsteady, swing through gait pattern with forward flexed posture, decrease esther, and decrease step length. 1 standing rest break and no LOB occurred. Verbal cues for upright and keep RW close. Pt report feeling SOB following gait training, but pt was able to easily recover after seated rest break    AM-PAC 6 CLICK MOBILITY  16    Patient left up in chair with call button in reach.    GOALS:   Multidisciplinary Problems     Physical Therapy Goals        Problem: Physical Therapy Goal    Goal Priority Disciplines Outcome Goal Variances Interventions   Physical Therapy Goal     PT, PT/OT Ongoing, Progressing     Description: Goals to be met by: 22     Patient will increase functional independence with mobility by performin. Supine to sit with Set-up [place]  2. Sit to supine with Set-up [place]  3. Rolling to Left and Right with Supervision.  4. Sit to stand transfer with Supervision  5. Bed to chair transfer with Supervision using Rolling Walker  6. Gait  x 150 feet with Supervision using Rolling Walker.   7. Wheelchair propulsion x75 feet with Supervision using bilateral uppper extremities  8. Ascend/Descend 4 inch curb step with Stand-by Assistance using Rolling Walker.  9. Pt in standing will use a  reacher to  an item from the floor w/ a RW and SPV                     Time Tracking:     PT Received On: 03/24/22  PT Total Time (min):   41    Billable Minutes: Gait Training 25 and Therapeutic Exercise 16    Treatment Type: Treatment  PT/PTA: PTA     PTA Visit Number: 3     03/24/2022

## 2022-03-24 NOTE — PT/OT/SLP PROGRESS
Occupational Therapy      Patient Name:  Marisol Kerr   MRN:  [id number]    Patient not seen today secondary decline session today X 2 attempts  . Will follow-up OT POC.    3/24/2022

## 2022-03-24 NOTE — PLAN OF CARE
Problem: Adult Inpatient Plan of Care  Goal: Plan of Care Review  3/24/2022 1529 by Shakila Koch, LPN  Outcome: Ongoing, Progressing  3/24/2022 1528 by Shakila Koch, LPN  Outcome: Ongoing, Progressing  Goal: Patient-Specific Goal (Individualized)  3/24/2022 1529 by Shakila Koch, LPN  Outcome: Ongoing, Progressing  3/24/2022 1528 by Shakila Koch, LPN  Outcome: Ongoing, Progressing  Goal: Absence of Hospital-Acquired Illness or Injury  3/24/2022 1529 by Shakila Koch, LPN  Outcome: Ongoing, Progressing  3/24/2022 1528 by Shakila Koch, LPN  Outcome: Ongoing, Progressing  Goal: Optimal Comfort and Wellbeing  3/24/2022 1529 by Shakila Koch, LPN  Outcome: Ongoing, Progressing  3/24/2022 1528 by Shakila Koch, LPN  Outcome: Ongoing, Progressing  Goal: Readiness for Transition of Care  3/24/2022 1529 by Shakila Koch, LPN  Outcome: Ongoing, Progressing  3/24/2022 1528 by Shakila Koch, LPN  Outcome: Ongoing, Progressing     Problem: Diabetes Comorbidity  Goal: Blood Glucose Level Within Targeted Range  3/24/2022 1529 by Shakila Koch, LPN  Outcome: Ongoing, Progressing  3/24/2022 1528 by Shakila Koch, LPN  Outcome: Ongoing, Progressing     Problem: Impaired Wound Healing  Goal: Optimal Wound Healing  3/24/2022 1529 by Shakila Koch, LPN  Outcome: Ongoing, Progressing  3/24/2022 1528 by Shakila Koch, LPN  Outcome: Ongoing, Progressing     Problem: Skin Injury Risk Increased  Goal: Skin Health and Integrity  3/24/2022 1529 by Shakila Koch, LPN  Outcome: Ongoing, Progressing  3/24/2022 1528 by Shakila Koch, LPN  Outcome: Ongoing, Progressing     Problem: Fall Injury Risk  Goal: Absence of Fall and Fall-Related Injury  3/24/2022 1529 by Shakila Koch, LPN  Outcome: Ongoing, Progressing  3/24/2022 1528 by Shakila Koch, LPN  Outcome: Ongoing, Progressing

## 2022-03-25 PROCEDURE — 97110 THERAPEUTIC EXERCISES: CPT | Mod: CQ

## 2022-03-25 PROCEDURE — 25000003 PHARM REV CODE 250: Performed by: HOSPITALIST

## 2022-03-25 PROCEDURE — 25000003 PHARM REV CODE 250: Performed by: NURSE PRACTITIONER

## 2022-03-25 PROCEDURE — 99900035 HC TECH TIME PER 15 MIN (STAT)

## 2022-03-25 PROCEDURE — 97116 GAIT TRAINING THERAPY: CPT | Mod: CQ

## 2022-03-25 PROCEDURE — 94761 N-INVAS EAR/PLS OXIMETRY MLT: CPT

## 2022-03-25 PROCEDURE — 97530 THERAPEUTIC ACTIVITIES: CPT | Mod: CQ

## 2022-03-25 PROCEDURE — 27000221 HC OXYGEN, UP TO 24 HOURS

## 2022-03-25 PROCEDURE — 11000004 HC SNF PRIVATE

## 2022-03-25 RX ADMIN — MICONAZOLE NITRATE 2 % TOPICAL POWDER: at 10:03

## 2022-03-25 RX ADMIN — MICONAZOLE NITRATE 2 % TOPICAL POWDER: at 09:03

## 2022-03-25 RX ADMIN — GABAPENTIN 100 MG: 100 CAPSULE ORAL at 09:03

## 2022-03-25 RX ADMIN — TRIAMCINOLONE ACETONIDE: 1 CREAM TOPICAL at 10:03

## 2022-03-25 RX ADMIN — Medication 100 MG: at 10:03

## 2022-03-25 RX ADMIN — TICAGRELOR 90 MG: 90 TABLET ORAL at 10:03

## 2022-03-25 RX ADMIN — FERROUS SULFATE TAB 325 MG (65 MG ELEMENTAL FE) 1 EACH: 325 (65 FE) TAB at 10:03

## 2022-03-25 RX ADMIN — ISOSORBIDE MONONITRATE 30 MG: 30 TABLET, EXTENDED RELEASE ORAL at 10:03

## 2022-03-25 RX ADMIN — ATORVASTATIN CALCIUM 80 MG: 20 TABLET, FILM COATED ORAL at 09:03

## 2022-03-25 RX ADMIN — METOPROLOL SUCCINATE 50 MG: 50 TABLET, EXTENDED RELEASE ORAL at 10:03

## 2022-03-25 RX ADMIN — AMLODIPINE BESYLATE 10 MG: 10 TABLET ORAL at 10:03

## 2022-03-25 RX ADMIN — FLUTICASONE FUROATE AND VILANTEROL TRIFENATATE 1 PUFF: 100; 25 POWDER RESPIRATORY (INHALATION) at 10:03

## 2022-03-25 RX ADMIN — Medication 1 CAPSULE: at 10:03

## 2022-03-25 RX ADMIN — PANTOPRAZOLE SODIUM 40 MG: 40 TABLET, DELAYED RELEASE ORAL at 10:03

## 2022-03-25 RX ADMIN — ASPIRIN 81 MG: 81 TABLET, COATED ORAL at 10:03

## 2022-03-25 RX ADMIN — Medication 400 MG: at 10:03

## 2022-03-25 RX ADMIN — TICAGRELOR 90 MG: 90 TABLET ORAL at 09:03

## 2022-03-25 RX ADMIN — Medication 400 MG: at 09:03

## 2022-03-25 RX ADMIN — TRIAMCINOLONE ACETONIDE: 1 CREAM TOPICAL at 09:03

## 2022-03-25 RX ADMIN — FUROSEMIDE 40 MG: 40 TABLET ORAL at 10:03

## 2022-03-25 RX ADMIN — TIOTROPIUM BROMIDE INHALATION SPRAY 2 PUFF: 3.12 SPRAY, METERED RESPIRATORY (INHALATION) at 10:03

## 2022-03-25 NOTE — PT/OT/SLP PROGRESS
Physical Therapy Treatment    Patient Name:  Marisol Kerr   MRN:  [id number]  Admit Date: 3/18/2022  Admitting Diagnosis: Chronic Diastolic Heart Failure  Recent Surgeries: none    General Precautions: Standard, fall   Orthopedic Precautions:N/A   Braces: N/A     Recommendations:     Discharge Recommendations:  home with home health   Level of Assistance Recommended at Discharge: Intermittent assistance   Discharge Equipment Recommendations: walker, rolling   Barriers to discharge: Decreased caregiver support    Assessment:     Marisol Kerr is a 76 y.o. female admitted with a medical diagnosis of Chronic Diastolic Heart Failure .     Pt tolerated today's therapy well. Pt ambulated to the bathroom and in the hallway (total 130ft) with RW and SBA. No LOB occurred. Pt required occasional rest breaks. Pt is progressing well and continues to benefit from therapy to achieve highest level of independence prior to discharge.     Performance deficits affecting function:  weakness, impaired endurance, impaired self care skills, impaired functional mobilty, gait instability, impaired balance, decreased upper extremity function, decreased lower extremity function, pain .    Rehab Potential is good    Activity Tolerance: Good    Plan:     Patient to be seen 5 x/week to address the above listed problems via gait training, therapeutic activities, therapeutic exercises, neuromuscular re-education, wheelchair management/training    · Plan of Care Expires: 04/18/22  · Plan of Care Reviewed with: patient    Subjective     Pt agreeable to therapy .     Pain/Comfort:  · Pain Rating 1:  (did not rate)    Patient's cultural, spiritual, [employer] conflicts given the current situation:  · no    Objective:     Patient found up in chair with oxygen (2L) upon PT entry to room.     Therapeutic Activities and Exercises:    Seated BLE therex x10, x15 reps: heel/toe raises, LAQ, and marching   Patient educated on role of therapy, goals of session,  and benefits of out of bed mobility.   Instructed on use of call button and importance of calling nursing staff for assistance with mobility   Questions/concerns addressed within PTA scope of practice  Pt verbalized understanding.    Functional Mobility:  · Transfers:     · Sit to Stand:  stand by assistance with rolling walker. Verbal cues for hand placement.   · Toilet Transfer: stand by assistance with  rolling walker  using  Step Transfer.   · Pt able to doff pants, but required assistance to donning pants.   · Gait: pt ambulated 2x15ft and 100ft with RW and close SBA and oxygen towed. Pt demonstrated mildly unsteady gait with decrease esther, decrease step length, and forward flexed posture. No rest break and no LOB occurred. Pt appeared to breathe heavy, but states that is her normal.   · SpO2 96% following gait training. Verbal cues for upright posture and increase step length.     AM-PAC 6 CLICK MOBILITY  16    Patient left up in chair with call button in reach.    GOALS:   Multidisciplinary Problems     Physical Therapy Goals        Problem: Physical Therapy Goal    Goal Priority Disciplines Outcome Goal Variances Interventions   Physical Therapy Goal     PT, PT/OT Ongoing, Progressing     Description: Goals to be met by: 22     Patient will increase functional independence with mobility by performin. Supine to sit with Set-up [place]  2. Sit to supine with Set-up [place]  3. Rolling to Left and Right with Supervision.  4. Sit to stand transfer with Supervision  5. Bed to chair transfer with Supervision using Rolling Walker  6. Gait  x 150 feet with Supervision using Rolling Walker.   7. Wheelchair propulsion x75 feet with Supervision using bilateral uppper extremities  8. Ascend/Descend 4 inch curb step with Stand-by Assistance using Rolling Walker.  9. Pt in standing will use a reacher to  an item from the floor w/ a RW and SPV                     Time Tracking:     PT  Received On: 03/25/22  PT Total Time (min):   42    Billable Minutes: Gait Training 20, Therapeutic Activity 10 and Therapeutic Exercise 12    Treatment Type: Treatment  PT/PTA: PTA     PTA Visit Number: 4     03/25/2022

## 2022-03-25 NOTE — PLAN OF CARE
Problem: Adult Inpatient Plan of Care  Goal: Plan of Care Review  Outcome: Ongoing, Progressing  Goal: Patient-Specific Goal (Individualized)  Outcome: Ongoing, Progressing  Goal: Optimal Comfort and Wellbeing  Outcome: Ongoing, Progressing     Problem: Diabetes Comorbidity  Goal: Blood Glucose Level Within Targeted Range  Outcome: Ongoing, Progressing     Problem: Impaired Wound Healing  Goal: Optimal Wound Healing  Outcome: Ongoing, Progressing     Problem: Skin Injury Risk Increased  Goal: Skin Health and Integrity  Outcome: Ongoing, Progressing     Problem: Fall Injury Risk  Goal: Absence of Fall and Fall-Related Injury  Outcome: Ongoing, Progressing

## 2022-03-26 PROCEDURE — 25000003 PHARM REV CODE 250: Performed by: NURSE PRACTITIONER

## 2022-03-26 PROCEDURE — 97530 THERAPEUTIC ACTIVITIES: CPT

## 2022-03-26 PROCEDURE — 27000221 HC OXYGEN, UP TO 24 HOURS

## 2022-03-26 PROCEDURE — 99900035 HC TECH TIME PER 15 MIN (STAT)

## 2022-03-26 PROCEDURE — 97110 THERAPEUTIC EXERCISES: CPT

## 2022-03-26 PROCEDURE — 25000003 PHARM REV CODE 250: Performed by: HOSPITALIST

## 2022-03-26 PROCEDURE — 94761 N-INVAS EAR/PLS OXIMETRY MLT: CPT

## 2022-03-26 PROCEDURE — 11000004 HC SNF PRIVATE

## 2022-03-26 RX ADMIN — LOPERAMIDE HYDROCHLORIDE 2 MG: 2 CAPSULE ORAL at 08:03

## 2022-03-26 RX ADMIN — MICONAZOLE NITRATE 2 % TOPICAL POWDER: at 09:03

## 2022-03-26 RX ADMIN — TRIAMCINOLONE ACETONIDE: 1 CREAM TOPICAL at 08:03

## 2022-03-26 RX ADMIN — MICONAZOLE NITRATE 2 % TOPICAL POWDER: at 08:03

## 2022-03-26 RX ADMIN — TICAGRELOR 90 MG: 90 TABLET ORAL at 10:03

## 2022-03-26 RX ADMIN — GABAPENTIN 100 MG: 100 CAPSULE ORAL at 08:03

## 2022-03-26 RX ADMIN — ISOSORBIDE MONONITRATE 30 MG: 30 TABLET, EXTENDED RELEASE ORAL at 10:03

## 2022-03-26 RX ADMIN — FUROSEMIDE 40 MG: 40 TABLET ORAL at 10:03

## 2022-03-26 RX ADMIN — ATORVASTATIN CALCIUM 80 MG: 20 TABLET, FILM COATED ORAL at 08:03

## 2022-03-26 RX ADMIN — AMLODIPINE BESYLATE 10 MG: 10 TABLET ORAL at 10:03

## 2022-03-26 RX ADMIN — METOPROLOL SUCCINATE 50 MG: 50 TABLET, EXTENDED RELEASE ORAL at 10:03

## 2022-03-26 RX ADMIN — PANTOPRAZOLE SODIUM 40 MG: 40 TABLET, DELAYED RELEASE ORAL at 10:03

## 2022-03-26 RX ADMIN — FERROUS SULFATE TAB 325 MG (65 MG ELEMENTAL FE) 1 EACH: 325 (65 FE) TAB at 10:03

## 2022-03-26 RX ADMIN — Medication 1 CAPSULE: at 10:03

## 2022-03-26 RX ADMIN — TIOTROPIUM BROMIDE INHALATION SPRAY 2 PUFF: 3.12 SPRAY, METERED RESPIRATORY (INHALATION) at 10:03

## 2022-03-26 RX ADMIN — Medication 400 MG: at 10:03

## 2022-03-26 RX ADMIN — MELATONIN TAB 3 MG 6 MG: 3 TAB at 08:03

## 2022-03-26 RX ADMIN — Medication 100 MG: at 10:03

## 2022-03-26 RX ADMIN — TICAGRELOR 90 MG: 90 TABLET ORAL at 08:03

## 2022-03-26 RX ADMIN — FLUTICASONE FUROATE AND VILANTEROL TRIFENATATE 1 PUFF: 100; 25 POWDER RESPIRATORY (INHALATION) at 10:03

## 2022-03-26 RX ADMIN — ASPIRIN 81 MG: 81 TABLET, COATED ORAL at 10:03

## 2022-03-26 RX ADMIN — TRIAMCINOLONE ACETONIDE: 1 CREAM TOPICAL at 09:03

## 2022-03-26 NOTE — PT/OT/SLP PROGRESS
"Occupational Therapy   Treatment    Name: Marisol Kerr  MRN: [id number]  Admit Date: 3/18/2022  Admitting Diagnosis:  Chronic Diastolic Heart Failure    General Precautions: Standard, fall   Orthopedic Precautions:N/A   Braces: N/A     Recommendations:     Discharge Recommendations: home with home health  Level of Assistance Recommended at Discharge: Intermittent assistance for ADL's and homemaking tasks  Discharge Equipment Recommendations:  walker, rolling  Barriers to discharge:  Decreased caregiver support    Assessment:     Marisol Kerr is a 76 y.o. female with a medical diagnosis of Chronic Diastolic Heart Failure.  Pt declined to perform functional mobility this date.  Session focused on UE exercises for functional strengthening of BUE for daily tasks. She presents with the following. Performance deficits affecting function are weakness, impaired endurance, impaired self care skills, impaired functional mobilty, gait instability, decreased safety awareness, pain, impaired cardiopulmonary response to activity.     Rehab Potential is good    Activity tolerance:  Fair    Plan:     Patient to be seen 6 x/week to address the above listed problems via self-care/home management, therapeutic activities, therapeutic exercises    · Plan of Care Expires: 04/18/22  · Plan of Care Reviewed with: patient    Subjective   "I thought I was going to rest today."  "I want to do exercises, but I don't like the gym.  I want to do the bike in the room"  Communicated with: patti prior to session.     Pain/Comfort:  Pain Rating 1: 0/10  Pain Rating Post-Intervention 1: 0/10    Patient's cultural, spiritual, Shinto conflicts given the current situation:  no    Objective:     Patient found up in chair with oxygen upon OT entry to room.    Bed Mobility:    · Deferred due to pt sitting UIC at beginning and end of session     Functional Mobility/Transfers:  · Pt declined    Activities of Daily Living:  · Pt declined due to having " already performed.     [facility] 6 Click ADL: 19    OT Exercises: UE Ergometer x 12 minutes for functional strenghtening of BUE for daily tasks    Treatment & Education:  Pt edu on role of OT, POC, importance of performing UE exercises to maintain functional strength for daily tasks, and benefit and importance of performing OOB activity.  Despite therapist encouraging and educating pt to perform mobility out of the chair, she declined.      Patient left up in chair with all lines intact and call button in reachEducation:      GOALS:   Multidisciplinary Problems     Occupational Therapy Goals        Problem: Occupational Therapy Goal    Goal Priority Disciplines Outcome Interventions   Occupational Therapy Goal     OT, PT/OT Ongoing, Progressing    Description: Goals to be met by: 04/01/22     Patient will increase functional independence with ADLs by performing:    UE Dressing with Modified [place].  LE Dressing with Modified [place] using AD.  Grooming while standing at sink with Modified [place].  Toileting from bedside commode with Modified [place] for hygiene and clothing management.   Bathing from  shower chair/bench with Minimal Assistance.  Toilet transfer to bedside commode with Modified [place].  Upper extremity exercise program 3x15 reps per handout, with supervision to increase UE strength/endurance for improved func mobility skills. Ongoing  Stand during functional task for 10' with S in preparation for increased independence during standing ADLs..                     Time Tracking:     OT Date of Treatment: OT Date of Treatment: 03/26/22  OT Total Time (min): Total Time (min): 24 min    Billable Minutes:Therapeutic Activity 12 min  Therapeutic Exercise 12 min    3/26/2022

## 2022-03-26 NOTE — PLAN OF CARE
Problem: Adult Inpatient Plan of Care  Goal: Plan of Care Review  Outcome: Ongoing, Progressing     Problem: Adult Inpatient Plan of Care  Goal: Patient-Specific Goal (Individualized)  Outcome: Ongoing, Progressing     Problem: Adult Inpatient Plan of Care  Goal: Optimal Comfort and Wellbeing  Outcome: Ongoing, Progressing     Problem: Adult Inpatient Plan of Care  Goal: Readiness for Transition of Care  Outcome: Ongoing, Progressing     Problem: Diabetes Comorbidity  Goal: Blood Glucose Level Within Targeted Range  Outcome: Ongoing, Progressing

## 2022-03-27 PROCEDURE — 25000003 PHARM REV CODE 250: Performed by: NURSE PRACTITIONER

## 2022-03-27 PROCEDURE — 94761 N-INVAS EAR/PLS OXIMETRY MLT: CPT

## 2022-03-27 PROCEDURE — 27000221 HC OXYGEN, UP TO 24 HOURS

## 2022-03-27 PROCEDURE — 11000004 HC SNF PRIVATE

## 2022-03-27 PROCEDURE — 25000003 PHARM REV CODE 250: Performed by: HOSPITALIST

## 2022-03-27 PROCEDURE — 25000003 PHARM REV CODE 250: Performed by: STUDENT IN AN ORGANIZED HEALTH CARE EDUCATION/TRAINING PROGRAM

## 2022-03-27 PROCEDURE — 99900035 HC TECH TIME PER 15 MIN (STAT)

## 2022-03-27 RX ORDER — SIMETHICONE 80 MG
1 TABLET,CHEWABLE ORAL 3 TIMES DAILY PRN
Status: DISCONTINUED | OUTPATIENT
Start: 2022-03-27 | End: 2022-04-01 | Stop reason: HOSPADM

## 2022-03-27 RX ADMIN — SIMETHICONE 80 MG: 80 TABLET, CHEWABLE ORAL at 10:03

## 2022-03-27 RX ADMIN — METOPROLOL SUCCINATE 50 MG: 50 TABLET, EXTENDED RELEASE ORAL at 09:03

## 2022-03-27 RX ADMIN — FUROSEMIDE 40 MG: 40 TABLET ORAL at 09:03

## 2022-03-27 RX ADMIN — PANTOPRAZOLE SODIUM 40 MG: 40 TABLET, DELAYED RELEASE ORAL at 09:03

## 2022-03-27 RX ADMIN — TICAGRELOR 90 MG: 90 TABLET ORAL at 09:03

## 2022-03-27 RX ADMIN — Medication 100 MG: at 09:03

## 2022-03-27 RX ADMIN — AMLODIPINE BESYLATE 10 MG: 10 TABLET ORAL at 09:03

## 2022-03-27 RX ADMIN — MELATONIN TAB 3 MG 6 MG: 3 TAB at 09:03

## 2022-03-27 RX ADMIN — GABAPENTIN 100 MG: 100 CAPSULE ORAL at 09:03

## 2022-03-27 RX ADMIN — MICONAZOLE NITRATE 2 % TOPICAL POWDER: at 09:03

## 2022-03-27 RX ADMIN — TIOTROPIUM BROMIDE INHALATION SPRAY 2 PUFF: 3.12 SPRAY, METERED RESPIRATORY (INHALATION) at 09:03

## 2022-03-27 RX ADMIN — ISOSORBIDE MONONITRATE 30 MG: 30 TABLET, EXTENDED RELEASE ORAL at 09:03

## 2022-03-27 RX ADMIN — FLUTICASONE FUROATE AND VILANTEROL TRIFENATATE 1 PUFF: 100; 25 POWDER RESPIRATORY (INHALATION) at 09:03

## 2022-03-27 RX ADMIN — TRIAMCINOLONE ACETONIDE: 1 CREAM TOPICAL at 09:03

## 2022-03-27 RX ADMIN — FERROUS SULFATE TAB 325 MG (65 MG ELEMENTAL FE) 1 EACH: 325 (65 FE) TAB at 09:03

## 2022-03-27 RX ADMIN — ASPIRIN 81 MG: 81 TABLET, COATED ORAL at 09:03

## 2022-03-27 RX ADMIN — ATORVASTATIN CALCIUM 80 MG: 20 TABLET, FILM COATED ORAL at 09:03

## 2022-03-27 NOTE — PLAN OF CARE
Problem: Adult Inpatient Plan of Care  Goal: Plan of Care Review  Outcome: Ongoing, Progressing  Flowsheets (Taken 3/27/2022 0306)  Plan of Care Reviewed With: patient  Goal: Patient-Specific Goal (Individualized)  Outcome: Ongoing, Progressing  Flowsheets (Taken 3/27/2022 0306)  Anxieties, Fears or Concerns: That she will get sick again  Individualized Care Needs: keep room a comfortable temperature  Patient-Specific Goals (Include Timeframe): patient will be free from injury or falls during shift  Goal: Absence of Hospital-Acquired Illness or Injury  Outcome: Ongoing, Progressing  Intervention: Identify and Manage Fall Risk  Flowsheets (Taken 3/27/2022 0306)  Safety Promotion/Fall Prevention:   assistive device/personal item within reach   diversional activities provided   Fall Risk reviewed with patient/family   lighting adjusted   medications reviewed   Supervised toileting - stay within arms reach   instructed to call staff for mobility  Goal: Optimal Comfort and Wellbeing  Outcome: Ongoing, Progressing  Intervention: Provide Person-Centered Care  Flowsheets (Taken 3/27/2022 0306)  Trust Relationship/Rapport:   care explained   questions answered   choices provided   questions encouraged   emotional support provided   reassurance provided   empathic listening provided   thoughts/feelings acknowledged     Problem: Diabetes Comorbidity  Goal: Blood Glucose Level Within Targeted Range  Outcome: Ongoing, Progressing  Intervention: Monitor and Manage Glycemia  Flowsheets (Taken 3/27/2022 0306)  Glycemic Management: blood glucose monitored     Problem: Impaired Wound Healing  Goal: Optimal Wound Healing  Outcome: Ongoing, Progressing  Intervention: Promote Wound Healing  Flowsheets (Taken 3/27/2022 0306)  Oral Nutrition Promotion:   rest periods promoted   safe use of adaptive equipment encouraged  Sleep/Rest Enhancement:   awakenings minimized   relaxation techniques promoted   regular sleep/rest pattern  promoted     Problem: Fall Injury Risk  Goal: Absence of Fall and Fall-Related Injury  Outcome: Ongoing, Progressing  Intervention: Identify and Manage Contributors  Flowsheets (Taken 3/27/2022 0306)  Self-Care Promotion:   independence encouraged   BADL personal objects within reach   safe use of adaptive equipment encouraged   BADL personal routines maintained  Medication Review/Management: medications reviewed  Intervention: Promote Injury-Free Environment  Flowsheets (Taken 3/27/2022 0306)  Safety Promotion/Fall Prevention:   assistive device/personal item within reach   diversional activities provided   Fall Risk reviewed with patient/family   lighting adjusted   medications reviewed   Supervised toileting - stay within arms reach   instructed to call staff for mobility

## 2022-03-28 LAB
ALBUMIN SERPL BCP-MCNC: 3 G/DL (ref 3.5–5.2)
ALP SERPL-CCNC: 96 U/L (ref 55–135)
ALT SERPL W/O P-5'-P-CCNC: 32 U/L (ref 10–44)
ANION GAP SERPL CALC-SCNC: 10 MMOL/L (ref 8–16)
AST SERPL-CCNC: 24 U/L (ref 10–40)
BASOPHILS # BLD AUTO: 0.04 K/UL (ref 0–0.2)
BASOPHILS NFR BLD: 0.6 % (ref 0–1.9)
BILIRUB DIRECT SERPL-MCNC: 0.3 MG/DL (ref 0.1–0.3)
BILIRUB SERPL-MCNC: 0.5 MG/DL (ref 0.1–1)
BUN SERPL-MCNC: 77 MG/DL (ref 8–23)
CALCIUM SERPL-MCNC: 9.1 MG/DL (ref 8.7–10.5)
CHLORIDE SERPL-SCNC: 107 MMOL/L (ref 95–110)
CO2 SERPL-SCNC: 27 MMOL/L (ref 23–29)
CREAT SERPL-MCNC: 1.6 MG/DL (ref 0.5–1.4)
DIFFERENTIAL METHOD: ABNORMAL
EOSINOPHIL # BLD AUTO: 0.1 K/UL (ref 0–0.5)
EOSINOPHIL NFR BLD: 1.9 % (ref 0–8)
ERYTHROCYTE [DISTWIDTH] IN BLOOD BY AUTOMATED COUNT: 14.1 % (ref 11.5–14.5)
EST. GFR  (AFRICAN AMERICAN): 36.3 ML/MIN/1.73 M^2
EST. GFR  (NON AFRICAN AMERICAN): 31.5 ML/MIN/1.73 M^2
GLUCOSE SERPL-MCNC: 88 MG/DL (ref 70–110)
HCT VFR BLD AUTO: 30.9 % (ref 37–48.5)
HGB BLD-MCNC: 9.5 G/DL (ref 12–16)
IMM GRANULOCYTES # BLD AUTO: 0.03 K/UL (ref 0–0.04)
IMM GRANULOCYTES NFR BLD AUTO: 0.4 % (ref 0–0.5)
LYMPHOCYTES # BLD AUTO: 1.1 K/UL (ref 1–4.8)
LYMPHOCYTES NFR BLD: 15.7 % (ref 18–48)
MAGNESIUM SERPL-MCNC: 1.5 MG/DL (ref 1.6–2.6)
MCH RBC QN AUTO: 29.1 PG (ref 27–31)
MCHC RBC AUTO-ENTMCNC: 30.7 G/DL (ref 32–36)
MCV RBC AUTO: 95 FL (ref 82–98)
MONOCYTES # BLD AUTO: 0.6 K/UL (ref 0.3–1)
MONOCYTES NFR BLD: 7.7 % (ref 4–15)
NEUTROPHILS # BLD AUTO: 5.4 K/UL (ref 1.8–7.7)
NEUTROPHILS NFR BLD: 73.7 % (ref 38–73)
NRBC BLD-RTO: 0 /100 WBC
PHOSPHATE SERPL-MCNC: 3.4 MG/DL (ref 2.7–4.5)
PLATELET # BLD AUTO: 145 K/UL (ref 150–450)
PMV BLD AUTO: 13.2 FL (ref 9.2–12.9)
POCT GLUCOSE: 116 MG/DL (ref 70–110)
POTASSIUM SERPL-SCNC: 3.7 MMOL/L (ref 3.5–5.1)
PROT SERPL-MCNC: 5.7 G/DL (ref 6–8.4)
RBC # BLD AUTO: 3.26 M/UL (ref 4–5.4)
SODIUM SERPL-SCNC: 144 MMOL/L (ref 136–145)
WBC # BLD AUTO: 7.26 K/UL (ref 3.9–12.7)

## 2022-03-28 PROCEDURE — 25000003 PHARM REV CODE 250: Performed by: NURSE PRACTITIONER

## 2022-03-28 PROCEDURE — 36415 COLL VENOUS BLD VENIPUNCTURE: CPT | Performed by: HOSPITALIST

## 2022-03-28 PROCEDURE — 84100 ASSAY OF PHOSPHORUS: CPT | Performed by: HOSPITALIST

## 2022-03-28 PROCEDURE — 11000004 HC SNF PRIVATE

## 2022-03-28 PROCEDURE — 97530 THERAPEUTIC ACTIVITIES: CPT

## 2022-03-28 PROCEDURE — 97110 THERAPEUTIC EXERCISES: CPT

## 2022-03-28 PROCEDURE — 85025 COMPLETE CBC W/AUTO DIFF WBC: CPT | Performed by: HOSPITALIST

## 2022-03-28 PROCEDURE — 97116 GAIT TRAINING THERAPY: CPT

## 2022-03-28 PROCEDURE — 94761 N-INVAS EAR/PLS OXIMETRY MLT: CPT

## 2022-03-28 PROCEDURE — 83735 ASSAY OF MAGNESIUM: CPT | Performed by: HOSPITALIST

## 2022-03-28 PROCEDURE — 80048 BASIC METABOLIC PNL TOTAL CA: CPT | Performed by: HOSPITALIST

## 2022-03-28 PROCEDURE — 80076 HEPATIC FUNCTION PANEL: CPT | Performed by: HOSPITALIST

## 2022-03-28 PROCEDURE — 99900035 HC TECH TIME PER 15 MIN (STAT)

## 2022-03-28 PROCEDURE — 25000003 PHARM REV CODE 250: Performed by: HOSPITALIST

## 2022-03-28 PROCEDURE — 27000221 HC OXYGEN, UP TO 24 HOURS

## 2022-03-28 RX ORDER — POTASSIUM CHLORIDE 20 MEQ/1
20 TABLET, EXTENDED RELEASE ORAL ONCE
Status: COMPLETED | OUTPATIENT
Start: 2022-03-28 | End: 2022-03-28

## 2022-03-28 RX ORDER — LANOLIN ALCOHOL/MO/W.PET/CERES
400 CREAM (GRAM) TOPICAL 2 TIMES DAILY
Status: COMPLETED | OUTPATIENT
Start: 2022-03-28 | End: 2022-03-29

## 2022-03-28 RX ADMIN — TICAGRELOR 90 MG: 90 TABLET ORAL at 08:03

## 2022-03-28 RX ADMIN — MICONAZOLE NITRATE 2 % TOPICAL POWDER: at 09:03

## 2022-03-28 RX ADMIN — TICAGRELOR 90 MG: 90 TABLET ORAL at 09:03

## 2022-03-28 RX ADMIN — SENNOSIDES AND DOCUSATE SODIUM 1 TABLET: 50; 8.6 TABLET ORAL at 09:03

## 2022-03-28 RX ADMIN — MELATONIN TAB 3 MG 6 MG: 3 TAB at 08:03

## 2022-03-28 RX ADMIN — TRIAMCINOLONE ACETONIDE: 1 CREAM TOPICAL at 08:03

## 2022-03-28 RX ADMIN — FUROSEMIDE 40 MG: 40 TABLET ORAL at 09:03

## 2022-03-28 RX ADMIN — ASPIRIN 81 MG: 81 TABLET, COATED ORAL at 09:03

## 2022-03-28 RX ADMIN — ATORVASTATIN CALCIUM 80 MG: 20 TABLET, FILM COATED ORAL at 08:03

## 2022-03-28 RX ADMIN — METOPROLOL SUCCINATE 50 MG: 50 TABLET, EXTENDED RELEASE ORAL at 09:03

## 2022-03-28 RX ADMIN — TRIAMCINOLONE ACETONIDE: 1 CREAM TOPICAL at 09:03

## 2022-03-28 RX ADMIN — GABAPENTIN 100 MG: 100 CAPSULE ORAL at 08:03

## 2022-03-28 RX ADMIN — TIOTROPIUM BROMIDE INHALATION SPRAY 2 PUFF: 3.12 SPRAY, METERED RESPIRATORY (INHALATION) at 10:03

## 2022-03-28 RX ADMIN — Medication 100 MG: at 09:03

## 2022-03-28 RX ADMIN — ERGOCALCIFEROL 50000 UNITS: 1.25 CAPSULE ORAL at 09:03

## 2022-03-28 RX ADMIN — SENNOSIDES AND DOCUSATE SODIUM 1 TABLET: 50; 8.6 TABLET ORAL at 08:03

## 2022-03-28 RX ADMIN — PANTOPRAZOLE SODIUM 40 MG: 40 TABLET, DELAYED RELEASE ORAL at 09:03

## 2022-03-28 RX ADMIN — MICONAZOLE NITRATE 2 % TOPICAL POWDER: at 08:03

## 2022-03-28 RX ADMIN — ISOSORBIDE MONONITRATE 30 MG: 30 TABLET, EXTENDED RELEASE ORAL at 09:03

## 2022-03-28 RX ADMIN — Medication 1 CAPSULE: at 10:03

## 2022-03-28 RX ADMIN — POTASSIUM CHLORIDE 20 MEQ: 1500 TABLET, EXTENDED RELEASE ORAL at 12:03

## 2022-03-28 RX ADMIN — FERROUS SULFATE TAB 325 MG (65 MG ELEMENTAL FE) 1 EACH: 325 (65 FE) TAB at 09:03

## 2022-03-28 RX ADMIN — Medication 400 MG: at 08:03

## 2022-03-28 RX ADMIN — Medication 400 MG: at 12:03

## 2022-03-28 RX ADMIN — AMLODIPINE BESYLATE 10 MG: 10 TABLET ORAL at 09:03

## 2022-03-28 RX ADMIN — FLUTICASONE FUROATE AND VILANTEROL TRIFENATATE 1 PUFF: 100; 25 POWDER RESPIRATORY (INHALATION) at 10:03

## 2022-03-28 NOTE — PLAN OF CARE
Problem: Adult Inpatient Plan of Care  Goal: Plan of Care Review  Outcome: Ongoing, Progressing  Goal: Patient-Specific Goal (Individualized)  Outcome: Ongoing, Progressing     Problem: Diabetes Comorbidity  Goal: Blood Glucose Level Within Targeted Range  Outcome: Ongoing, Progressing     Problem: Impaired Wound Healing  Goal: Optimal Wound Healing  Outcome: Ongoing, Progressing     Problem: Skin Injury Risk Increased  Goal: Skin Health and Integrity  Outcome: Ongoing, Progressing     Problem: Fall Injury Risk  Goal: Absence of Fall and Fall-Related Injury  Outcome: Ongoing, Progressing

## 2022-03-28 NOTE — PROGRESS NOTES
[facility] - Skilled Nursing  Adult Nutrition  Progress Note    SUMMARY       Recommendations    1. Continue Cardiac diet with Carlo BID.  Goals: 1. Pt's intake meals >75% by RD follow up.  Nutrition Goal Status: goal met  Communication of RD Recs: reviewed with physician    Assessment and Plan  Nutrition Problem  Increased nutrient needs     Related to (etiology):   Wound healing     Signs and Symptoms (as evidenced by):   Pt with PI stage 2 to coccyx     Interventions(treatment strategy):  Collaboration of care with providers.  Mineral modified diet: Cardiac  Modular protein supplement: Carlo BID     Nutrition Diagnosis Status:   Continues    Malnutrition Assessment 3/21/22                 Orbital Region (Subcutaneous Fat Loss): well nourished  Upper Arm Region (Subcutaneous Fat Loss): well nourished  Thoracic and Lumbar Region: well nourished   Evangelical Region (Muscle Loss): well nourished  Clavicle Bone Region (Muscle Loss): well nourished  Clavicle and Acromion Bone Region (Muscle Loss): well nourished  Scapular Bone Region (Muscle Loss): well nourished  Dorsal Hand (Muscle Loss): well nourished  Patellar Region (Muscle Loss): well nourished  Anterior Thigh Region (Muscle Loss): well nourished  Posterior Calf Region (Muscle Loss): well nourished                 Reason for Assessment    Reason For Assessment: RD follow-up  Diagnosis:  (chronic hyposemic respiratory failure)  Relevant Medical History: COPD, DM2, HTN, CAD, Colon Ca, NSTEMI  Interdisciplinary Rounds: did not attend  General Information Comments: Pt continues with % intake meals. Pt nourished per NFPE 3/21; 8 lb wt loss noted, will monitor.  Nutrition Discharge Planning: cardiac healthy, carb consistent diet    Nutrition Risk Screen    Nutrition Risk Screen: no indicators present    Nutrition/Diet History    Patient Reported Diet/Restrictions/Preferences: heart healthy  Spiritual, Cultural Beliefs, Christian Practices, Values that Affect Care:  "no    Anthropometrics    Temp: 98.1 °F (36.7 °C)  Height: 5' 3" (160 cm)  Height (inches): 63 in  Weight Method: Standard Scale  Weight: 79.1 kg (174 lb 6.1 oz)  Weight (lb): 174.39 lb  Ideal Body Weight (IBW), Female: 115 lb  % Ideal Body Weight, Female (lb): 159.11 %  BMI (Calculated): 30.9       Lab/Procedures/Meds    Pertinent Labs Reviewed: reviewed  Pertinent Labs Comments: BUN 77, Cre 1.6, eGFR 31.5, Mg 1.5  Pertinent Medications Reviewed: reviewed  Pertinent Medications Comments: coenzyme Q10, ferrous sulfate, lasix, gabapentin, magnesium oxide, omega-3, senna, colace, protonix    Estimated/Assessed Needs    Weight Used For Calorie Calculations: 83.5 kg (184 lb 1.4 oz)  Energy Calorie Requirements (kcal): 1434 kcal  Energy Need Method: [place]-St Jeor (PAL 1.10)  Protein Requirements: 775-92g  Weight Used For Protein Calculations: 83.5 kg (184 lb 1.4 oz)        RDA Method (mL): 1434  CHO Requirement: 179g      Nutrition Prescription Ordered    Current Diet Order: Cardiac  Oral Nutrition Supplement: Carlo BID    Evaluation of Received Nutrient/Fluid Intake    Comments: last BM 3/27  % Intake of Estimated Energy Needs: 75 - 100 %  % Meal Intake: 75 - 100 %    Nutrition Risk    Level of Risk/Frequency of Follow-up: low     Monitor and Evaluation    Food and Nutrient Intake: energy intake, food and beverage intake  Food and Nutrient Adminstration: diet order  Knowledge/Beliefs/Attitudes: food and nutrition knowledge/skill  Anthropometric Measurements: weight, weight change  Biochemical Data, Medical Tests and Procedures: electrolyte and renal panel, gastrointestinal profile, glucose/endocrine profile, inflammatory profile, lipid profile  Nutrition-Focused Physical Findings: overall appearance, extremities, muscles and bones, head and eyes, skin     Nutrition Follow-Up    RD Follow-up?: Yes    "

## 2022-03-28 NOTE — PT/OT/SLP PROGRESS
"Occupational Therapy   Treatment    Name: Marisol Kerr  MRN: [id number]  Admit Date: 3/18/2022  Admitting Diagnosis:  Chronic Hypoxemic Respiratory Failure    General Precautions: Standard, fall   Orthopedic Precautions:N/A   Braces: N/A     Recommendations:     Discharge Recommendations: home with home health  Level of Assistance Recommended at Discharge: Intermittent assistance for ADL's and homemaking tasks  Discharge Equipment Recommendations:  walker, rolling  Barriers to discharge:  Decreased caregiver support    Assessment:     Marisol Kerr is a 76 y.o. female with a medical diagnosis of Chronic Hypoxemic Respiratory Failure.  Pt had good tolerance of session, ambulating ~84 ft with SBA with RW and performing UE exercises in the chair.  She presents with the following. Performance deficits affecting function are weakness, impaired endurance, impaired self care skills, impaired functional mobilty, gait instability, pain, impaired cardiopulmonary response to activity, impaired balance.     Rehab Potential is good    Activity tolerance:  Good    Plan:     Patient to be seen 6 x/week to address the above listed problems via self-care/home management, therapeutic activities, therapeutic exercises    · Plan of Care Expires: 04/18/22  · Plan of Care Reviewed with: patient    Subjective   "My left shoulder hurts due to arthritis."  Communicated with: nursing prior to session.    Pain/Comfort:  · Pain Rating 1: 0/10 (at rest)  · Location - Side 1: Left  · Location - Orientation 1: generalized  · Location 1: shoulder (during UE exercises, not rated)  · Pain Addressed 1: Cessation of Activity  · Pain Rating Post-Intervention 1: 0/10    Patient's cultural, spiritual, Alevism conflicts given the current situation:  · no    Objective:     Patient found up in chair with oxygen upon OT entry to room.    Bed Mobility:    · N/a due to pt sitting UIC at beginning and end of session     Functional Mobility/Transfers:  · Patient " completed Sit <> Stand Transfer from bedside chair x 1 trial with stand by assistance  with  rolling walker   · Functional Mobility: Pt ambulated ~84 ft in her room and in the hallway with SBA with RW and portable oxygen tank in tow.  She had no LOB or complaints of dizziness, but she was SOB.      Activities of Daily Living:  · Pt declined to perform     [facility] 6 Click ADL: 19    OT Exercises: AROM BUE strengthening exercises while sitting UIC to maintain functional strength in her BUE for daily tasks.  Pt performed the following exercises x 2 sets of 15 reps each with 1 lb hand weights: shouder flex/ext, chest presses/rows, and bicep curls.  She reported mild pain in her L shoulder due to arthritis.  Therapist educated pt on ceasing performing LUE shoulder exercises when she feels pain.      Treatment & Education:  Pt edu on role of OT, POC, , benefit of performing OOB activity, and safety when performing functional transfers and mobility.    - Pt's oxygen saturation rate remained above 90% during exercises.      Patient left up in chair with call button in reachEducation:      GOALS:   Multidisciplinary Problems     Occupational Therapy Goals        Problem: Occupational Therapy Goal    Goal Priority Disciplines Outcome Interventions   Occupational Therapy Goal     OT, PT/OT Ongoing, Progressing    Description: Goals to be met by: 04/01/22     Patient will increase functional independence with ADLs by performing:    UE Dressing with Modified [place].  LE Dressing with Modified [place] using AD.  Grooming while standing at sink with Modified [place].  Toileting from bedside commode with Modified [place] for hygiene and clothing management.   Bathing from  shower chair/bench with Minimal Assistance.  Toilet transfer to bedside commode with Modified [place].  Upper extremity exercise program 3x15 reps per handout, with supervision to increase UE strength/endurance for improved func mobility  skills. Ongoing  Stand during functional task for 10' with S in preparation for increased independence during standing ADLs..                     Time Tracking:     OT Date of Treatment: OT Date of Treatment: 03/28/22  OT Total Time (min): Total Time (min): 35 min    Billable Minutes:Therapeutic Activity 18 min  Therapeutic Exercise 17 min    3/28/2022

## 2022-03-28 NOTE — PT/OT/SLP PROGRESS
Physical Therapy Treatment    Patient Name:  Marisol Kerr   MRN:  [id number]  Admit Date: 3/18/2022  Admitting Diagnosis: Chronic Hypoxemic Respiratory Failure  Recent Surgeries: none    General Precautions: Standard, fall   Orthopedic Precautions:N/A   Braces:   none    Recommendations:     Discharge Recommendations:  home with home health   Level of Assistance Recommended at Discharge: Intermittent assistance   Discharge Equipment Recommendations: walker, rolling   Barriers to discharge: Decreased caregiver support    Assessment:     Marisol Kerr is a 76 y.o. female admitted with a medical diagnosis of Chronic Hypoxemic Respiratory Failure . Pt anny session well w/ good participation. She is making good progress w/ her mobility towards set goals. She is at a SBA/SPV level overall for transfers and gait. She will continue PT POC to prepare for upcoming D/C home.      Goal was added to work on gait w/ a rollator since this is what Ms. Damon already owns and was using PTA.     Performance deficits affecting function:  weakness, impaired endurance, impaired self care skills, impaired functional mobilty, gait instability, impaired balance, decreased upper extremity function, decreased lower extremity function, pain .    Rehab Potential is good    Activity Tolerance: Good    Plan:     Patient to be seen 5 x/week to address the above listed problems via gait training, therapeutic activities, therapeutic exercises, neuromuscular re-education, wheelchair management/training    · Plan of Care Expires: 04/18/22  · Plan of Care Reviewed with: patient    Subjective     Pt agreeable to session.     Pain/Comfort:  Pain Rating 1: 0/10  Pain Rating Post-Intervention 1: 0/10    Patient's cultural, spiritual, Pentecostal conflicts given the current situation:  no    Objective:     Communicated with patient prior to session.  Patient found up in chair with oxygen upon PT entry to room.     Therapeutic Activities and Exercises:   Pt  "completed toileting w/ SBA overall including: SPT to/from toilet w/ RW and SPV, LBD (pants) w/ SBA, and pablito care in sitting w/ SPV    Pt in standing used a reacher to  3 rings from the floor w/ a RW and SBA. Cues/demo for technique.       Functional Mobility:  · Transfers:   · Sit<>stand to/from w/c; multiple trials; all w/ RW and SPV   · Stand pivot to/from bedside chair, w/c, and toilet; all w/ RW and SPV  · Gait:   · 130ft w/ RW and SBA/SPV for safety  · Cues for posture and to remain inside RW for stability  · Plan to work on gait w/ rollator next session- see new goal below  · Stairs:   · Asc/manny 4" curb w/ RW and CGA/SBA for safety  · Cues/demo for technique/sequence  · Wheelchair Propulsion:    · 100ft w/ BUE and SPV  · Limited in distance by fatigue    AM-PAC 6 CLICK MOBILITY  17    Patient left up in chair with all lines intact and call button in reach.    GOALS:   Multidisciplinary Problems     Physical Therapy Goals        Problem: Physical Therapy Goal    Goal Priority Disciplines Outcome Goal Variances Interventions   Physical Therapy Goal     PT, PT/OT Ongoing, Progressing     Description: Goals to be met by: 22     Patient will increase functional independence with mobility by performin. Supine to sit with Set-up [place]  2. Sit to supine with Set-up [place]  3. Rolling to Left and Right with Supervision.  4. Sit to stand transfer with Supervision- Met 3/28/2022  5. Bed to chair transfer with Supervision using Rolling Walker- Met 3/28/2022  6. Gait  x 150 feet with Supervision using Rolling Walker.   7. Wheelchair propulsion x75 feet with Supervision using bilateral uppper extremities- Met 3/28/2022  8. Ascend/Descend 4 inch curb step with Stand-by Assistance using Rolling Walker.  9. Pt in standing will use a reacher to  an item from the floor w/ a RW and SPV    New Goals added 3/28/2022  10. Gait x100ft w/ rollator and SPV                     Time Tracking:     PT " Received On: 03/28/22  PT Total Time (min): 40 min     Billable Minutes: Gait Training 10, Therapeutic Activity 30 and Total Time 40    Treatment Type: Treatment  PT/PTA: PT     PTA Visit Number: 0     03/28/2022

## 2022-03-29 LAB — POCT GLUCOSE: 123 MG/DL (ref 70–110)

## 2022-03-29 PROCEDURE — 27000221 HC OXYGEN, UP TO 24 HOURS

## 2022-03-29 PROCEDURE — 25000003 PHARM REV CODE 250: Performed by: NURSE PRACTITIONER

## 2022-03-29 PROCEDURE — 25000242 PHARM REV CODE 250 ALT 637 W/ HCPCS: Performed by: HOSPITALIST

## 2022-03-29 PROCEDURE — 97116 GAIT TRAINING THERAPY: CPT | Mod: CQ

## 2022-03-29 PROCEDURE — 97110 THERAPEUTIC EXERCISES: CPT

## 2022-03-29 PROCEDURE — 99900035 HC TECH TIME PER 15 MIN (STAT)

## 2022-03-29 PROCEDURE — 25000003 PHARM REV CODE 250: Performed by: HOSPITALIST

## 2022-03-29 PROCEDURE — 11000004 HC SNF PRIVATE

## 2022-03-29 PROCEDURE — 97530 THERAPEUTIC ACTIVITIES: CPT | Mod: CQ

## 2022-03-29 PROCEDURE — 94761 N-INVAS EAR/PLS OXIMETRY MLT: CPT

## 2022-03-29 PROCEDURE — 97535 SELF CARE MNGMENT TRAINING: CPT

## 2022-03-29 RX ADMIN — Medication 1 CAPSULE: at 09:03

## 2022-03-29 RX ADMIN — METOPROLOL SUCCINATE 50 MG: 50 TABLET, EXTENDED RELEASE ORAL at 09:03

## 2022-03-29 RX ADMIN — PANTOPRAZOLE SODIUM 40 MG: 40 TABLET, DELAYED RELEASE ORAL at 09:03

## 2022-03-29 RX ADMIN — FERROUS SULFATE TAB 325 MG (65 MG ELEMENTAL FE) 1 EACH: 325 (65 FE) TAB at 09:03

## 2022-03-29 RX ADMIN — AMLODIPINE BESYLATE 10 MG: 10 TABLET ORAL at 09:03

## 2022-03-29 RX ADMIN — TICAGRELOR 90 MG: 90 TABLET ORAL at 09:03

## 2022-03-29 RX ADMIN — ISOSORBIDE MONONITRATE 30 MG: 30 TABLET, EXTENDED RELEASE ORAL at 09:03

## 2022-03-29 RX ADMIN — Medication 400 MG: at 09:03

## 2022-03-29 RX ADMIN — TRIAMCINOLONE ACETONIDE: 1 CREAM TOPICAL at 08:03

## 2022-03-29 RX ADMIN — Medication 100 MG: at 09:03

## 2022-03-29 RX ADMIN — MICONAZOLE NITRATE 2 % TOPICAL POWDER: at 08:03

## 2022-03-29 RX ADMIN — TICAGRELOR 90 MG: 90 TABLET ORAL at 08:03

## 2022-03-29 RX ADMIN — Medication 400 MG: at 08:03

## 2022-03-29 RX ADMIN — MELATONIN TAB 3 MG 6 MG: 3 TAB at 08:03

## 2022-03-29 RX ADMIN — ASPIRIN 81 MG: 81 TABLET, COATED ORAL at 09:03

## 2022-03-29 RX ADMIN — SENNOSIDES AND DOCUSATE SODIUM 1 TABLET: 50; 8.6 TABLET ORAL at 08:03

## 2022-03-29 RX ADMIN — ONDANSETRON 8 MG: 8 TABLET, ORALLY DISINTEGRATING ORAL at 08:03

## 2022-03-29 RX ADMIN — TRIAMCINOLONE ACETONIDE: 1 CREAM TOPICAL at 09:03

## 2022-03-29 RX ADMIN — TIOTROPIUM BROMIDE INHALATION SPRAY 2 PUFF: 3.12 SPRAY, METERED RESPIRATORY (INHALATION) at 09:03

## 2022-03-29 RX ADMIN — FUROSEMIDE 40 MG: 40 TABLET ORAL at 09:03

## 2022-03-29 RX ADMIN — ATORVASTATIN CALCIUM 80 MG: 20 TABLET, FILM COATED ORAL at 08:03

## 2022-03-29 RX ADMIN — GABAPENTIN 100 MG: 100 CAPSULE ORAL at 08:03

## 2022-03-29 RX ADMIN — MICONAZOLE NITRATE 2 % TOPICAL POWDER: at 09:03

## 2022-03-29 RX ADMIN — FLUTICASONE FUROATE AND VILANTEROL TRIFENATATE 1 PUFF: 100; 25 POWDER RESPIRATORY (INHALATION) at 09:03

## 2022-03-29 NOTE — PT/OT/SLP PROGRESS
Occupational Therapy   Treatment    Name: Marisol Kerr  MRN: [id number]  Admit Date: 3/18/2022  Admitting Diagnosis:  NSTEMI (non-ST elevated myocardial infarction)    General Precautions: Standard, fall   Orthopedic Precautions:N/A   Braces: N/A     Recommendations:     Discharge Recommendations: home with home health  Level of Assistance Recommended at Discharge: 24 hours light assistance for ADL's and homemaking tasks  Discharge Equipment Recommendations:  walker, rolling  Barriers to discharge:  Decreased caregiver support    Assessment:     Marisol Kerr is a 76 y.o. female with a medical diagnosis of NSTEMI (non-ST elevated myocardial infarction) .  She remains limited in performance of self-care , functional mobility and ADLs and currently not performing tasks at [facility] . Currently presenting with performance deficits including weakness, impaired endurance, impaired self care skills, impaired functional mobilty, gait instability, impaired balance, decreased coordination, decreased lower extremity function, decreased upper extremity function, impaired cardiopulmonary response to activity, decreased safety awareness.   Pt tolerated Tx without incident and is making progress with self care tasks, functional mobility and functional transfers .  She would continue to benefit from OT intervention to further her functional (I)ce and safety.    Rehab Potential is good    Activity tolerance:  Good    Plan:     Patient to be seen 6 x/week to address the above listed problems via self-care/home management, therapeutic activities, therapeutic exercises    · Plan of Care Expires: 04/18/22  · Plan of Care Reviewed with: patient    Subjective     Communicated with: nurse prior to session.     Pain/Comfort:  · Pain Rating 1: 0/10  · Pain Rating Post-Intervention 1: 0/10    Patient's cultural, spiritual, Hoahaoism conflicts given the current situation:  · no    Objective:     Patient found up in chair with oxygen upon OT entry to  room.    Bed Mobility:    · Pt seated in bedside chair at onset of therapy session.    Activities of Daily Living:  · Grooming: stand by assistance with grooming performed seated in bedside chair.    [facility] 6 Click ADL: 19    OT Exercises: UE Ergometer performed 15 minutes on UBE with Mod resistance. UE exercises performed to increase functional endurance and strength in order increase independence when performing self care tasks, functional ambulation, W/C propulsion, and functional standing activities .    Treatment & Education:  Pt edu on POC, safety when performing self care tasks , safety when performing functional transfers and mobility.  - White board updated  - Self care tasks completed-- as noted above       Patient left up in chair with call button in reachEducation:      GOALS:   Multidisciplinary Problems     Occupational Therapy Goals        Problem: Occupational Therapy Goal    Goal Priority Disciplines Outcome Interventions   Occupational Therapy Goal     OT, PT/OT Ongoing, Progressing    Description: Goals to be met by: 04/01/22     Patient will increase functional independence with ADLs by performing:    UE Dressing with Modified [place].  LE Dressing with Modified [place] using AD.  Grooming while standing at sink with Modified [place].  Toileting from bedside commode with Modified [place] for hygiene and clothing management.   Bathing from  shower chair/bench with Minimal Assistance.  Toilet transfer to bedside commode with Modified [place].  Upper extremity exercise program 3x15 reps per handout, with supervision to increase UE strength/endurance for improved func mobility skills. Ongoing  Stand during functional task for 10' with S in preparation for increased independence during standing ADLs..                     Time Tracking:     OT Date of Treatment: OT Date of Treatment: 03/29/22  OT Total Time (min): Total Time (min): 34 min    Billable Minutes:Self Care/Home  Management 15  Therapeutic Exercise 19    3/29/2022

## 2022-03-29 NOTE — PLAN OF CARE
03/29/22 1334   Medicare Message   Important Message from Medicare regarding Discharge Appeal Rights Given to patient/caregiver;Explained to patient/caregiver;Signed/date by patient/caregiver   Date IMM was signed 03/29/22   Time IMM was signed 1055     ADRIANA met with pt to discuss upcoming dc on Friday. ADRIANA provided pt with [facility], explained appeal process. Copy given to pt and original placed in chart.    Veena Dwyer LCSW  PRN

## 2022-03-29 NOTE — PROGRESS NOTES
[facility] - Skilled Nursing  Wound Care    Patient Name:  Marisol Kerr   MRN:  [id number]  Date: 3/29/2022  Diagnosis: NSTEMI (non-ST elevated myocardial infarction)    History:     Past Medical History:   Diagnosis Date    CAD (coronary artery disease)     Cancer     colon    CHF (congestive heart failure)     Colitis     Colon cancer     COPD (chronic obstructive pulmonary disease)     Decreased hearing     Diabetes mellitus     Diabetes mellitus, type 2     Hypercholesterolemia     Hypertension     Hypoxemia     Insomnia     Obesity     Osteoarthritis        Social History     Socioeconomic History    Marital status:    Tobacco Use    Smoking status: Former Smoker     Packs/day: 3.00     Years: 50.00     Pack years: 150.00     Types: Cigarettes     Start date: 3/30/1964     Quit date: 2006     Years since quittin.0    Smokeless tobacco: Never Used    Tobacco comment: ex smoker 15 years   Substance and Sexual Activity    Alcohol use: Yes    Drug use: No    Sexual activity: Never       Precautions:     Allergies as of 2022 - Reviewed 2022   Allergen Reaction Noted    Iodinated contrast media  06/15/2015    Strawberries [strawberry] Hives and Itching 2016       [facility] Assessment Details/Treatment   Wound care follow-up  Ms. Kerr is sitting up in a chair, sacral spine remains with partial thickness skin loss and red/excoriated skin with probable skin fungal infection-    Plan:  Sacral spine- continue Triad ointment BID/prn sprinkle with Miconazole powder BID    Nursing to continue care, pressure prevention measures  Wound care will follow-up prn  Recommendations made to primary team for above plan per written report . Orders placed.      22 0900        Altered Skin Integrity 22 2030 Coccyx #1 Partial thickness tissue loss. Shallow open ulcer with a red or pink wound bed, without slough. Intact or Open/Ruptured Serum-filled blister.   Date First Assessed/Time First  Assessed: 03/12/22 2030   Altered Skin Integrity Present on Admission: yes  Location: Coccyx  Wound Number: #1  Description of Altered Skin Integrity: Partial thickness tissue loss. Shallow open ulcer with a red or pink ...   Wound Image    Dressing Appearance Open to air   Drainage Amount None   Drainage Characteristics/Odor No odor   Appearance Red;Moist   Tissue loss description Partial thickness   Periwound Area Excoriated;Redness   Wound Edges Irregular;Open   Wound Length (cm) 0.6 cm   Wound Width (cm) 0.2 cm   Wound Depth (cm) 0.2 cm   Wound Volume (cm^3) 0.024 cm^3   Wound Surface Area (cm^2) 0.12 cm^2   Care Cleansed with:;Soap and water;Applied:;Skin Barrier       03/29/2022

## 2022-03-29 NOTE — PLAN OF CARE
Problem: Adult Inpatient Plan of Care  Goal: Plan of Care Review  Outcome: Ongoing, Progressing  Goal: Patient-Specific Goal (Individualized)  Outcome: Ongoing, Progressing  Goal: Absence of Hospital-Acquired Illness or Injury  Outcome: Ongoing, Progressing     Problem: Diabetes Comorbidity  Goal: Blood Glucose Level Within Targeted Range  Outcome: Ongoing, Progressing     Problem: Impaired Wound Healing  Goal: Optimal Wound Healing  Outcome: Ongoing, Progressing     Problem: Skin Injury Risk Increased  Goal: Skin Health and Integrity  Outcome: Ongoing, Progressing     Problem: Fall Injury Risk  Goal: Absence of Fall and Fall-Related Injury  Outcome: Ongoing, Progressing

## 2022-03-29 NOTE — PLAN OF CARE
Problem: Occupational Therapy Goal  Goal: Occupational Therapy Goal  Description: Goals to be met by: 04/01/22     Patient will increase functional independence with ADLs by performing:    UE Dressing with Modified [place].  LE Dressing with Modified [place] using AD.  Grooming while standing at sink with Modified [place].  Toileting from bedside commode with Modified [place] for hygiene and clothing management.   Bathing from  shower chair/bench with Minimal Assistance.  Toilet transfer to bedside commode with Modified [place].  Upper extremity exercise program 3x15 reps per handout, with supervision to increase UE strength/endurance for improved func mobility skills. Ongoing  Stand during functional task for 10' with S in preparation for increased independence during standing ADLs..    Outcome: Ongoing, Progressing

## 2022-03-29 NOTE — PT/OT/SLP PROGRESS
"Physical Therapy Treatment    Patient Name:  Marisol Kerr   MRN:  [id number]  Admit Date: 3/18/2022  Admitting Diagnosis: NSTEMI (non-ST elevated myocardial infarction)  Recent Surgeries: N/A    General Precautions: Standard, fall   Orthopedic Precautions:N/A   Braces: N/A     Recommendations:     Discharge Recommendations:  home with home health   Level of Assistance Recommended at Discharge: Intermittent assistance   Discharge Equipment Recommendations: walker, rolling   Barriers to discharge: Decreased caregiver support    Assessment:     Marisol Kerr is a 76 y.o. female admitted with a medical diagnosis of NSTEMI (non-ST elevated myocardial infarction) . requiring light assistance and verbal cues for transfers, gait, elevations to prevent falls due to FFP, instability, fatigue, weakness. Pt remains motivated to participate in PT session and will cont to benefit from skilled PT intervention..      Performance deficits affecting function:  weakness, impaired endurance, impaired self care skills, impaired functional mobilty, gait instability, impaired balance, decreased upper extremity function, decreased lower extremity function, pain .    Rehab Potential is good    Activity Tolerance: Good    Plan:     Patient to be seen 5 x/week to address the above listed problems via gait training, therapeutic activities, therapeutic exercises, neuromuscular re-education, wheelchair management/training    · Plan of Care Expires: 04/18/22  · Plan of Care Reviewed with: patient    Subjective     "The walker I have at home is not a rollator!  It has 2 big wheels on the front and 2 little wheels on back and a seat that folds when you close it.  It looks more like the walker I use here, not like that (rollator)".     Pain/Comfort:  · Pain Rating 1: 0/10  · Pain Rating Post-Intervention 1: 0/10    Patient's cultural, spiritual, Mosque conflicts given the current situation:  · no    Objective:      Patient found up in chair with " "oxygen upon PT entry to room.     Therapeutic Activities and Exercises:   Pt performs B LE AROM ther ex's while seated in chair x2 sets of 15 reps with vc's    Functional Mobility:  · Transfers:     · Sit to Stand:  supervision with rolling walker  · Gait: RW SBA/close sup 180ft with supplemental O2 at 2LPM via NC.  Pt demonstrates FFP.  Vc's for upright posture, decreased esther and safety    Pt declines using rollator stating "That's not what I have at home.  I'm afraid that's going to be too fast for me!"    AM-PAC 6 CLICK MOBILITY  17    Patient left up in chair with all lines intact and call button in reach.    GOALS:   Multidisciplinary Problems     Physical Therapy Goals        Problem: Physical Therapy Goal    Goal Priority Disciplines Outcome Goal Variances Interventions   Physical Therapy Goal     PT, PT/OT Ongoing, Progressing     Description: Goals to be met by: 22     Patient will increase functional independence with mobility by performin. Supine to sit with Set-up [place]  2. Sit to supine with Set-up [place]  3. Rolling to Left and Right with Supervision.  4. Sit to stand transfer with Supervision- Met 3/28/2022  5. Bed to chair transfer with Supervision using Rolling Walker- Met 3/28/2022  6. Gait  x 150 feet with Supervision using Rolling Walker.   7. Wheelchair propulsion x75 feet with Supervision using bilateral uppper extremities- Met 3/28/2022  8. Ascend/Descend 4 inch curb step with Stand-by Assistance using Rolling Walker.  9. Pt in standing will use a reacher to  an item from the floor w/ a RW and SPV    New Goals added 3/28/2022  10. Gait x100ft w/ rollator and SPV                     Time Tracking:     PT Received On: 22  PT Total Time (min):   32 min    Billable Minutes: Gait Training 16 and Therapeutic Activity 16    Treatment Type: Treatment  PT/PTA: PTA     PTA Visit Number: 2022  "

## 2022-03-30 PROCEDURE — 11000004 HC SNF PRIVATE

## 2022-03-30 PROCEDURE — 94761 N-INVAS EAR/PLS OXIMETRY MLT: CPT

## 2022-03-30 PROCEDURE — 25000003 PHARM REV CODE 250: Performed by: NURSE PRACTITIONER

## 2022-03-30 PROCEDURE — 99900035 HC TECH TIME PER 15 MIN (STAT)

## 2022-03-30 PROCEDURE — 97530 THERAPEUTIC ACTIVITIES: CPT | Mod: CO

## 2022-03-30 PROCEDURE — 25000003 PHARM REV CODE 250: Performed by: HOSPITALIST

## 2022-03-30 PROCEDURE — 27000221 HC OXYGEN, UP TO 24 HOURS

## 2022-03-30 PROCEDURE — 25000242 PHARM REV CODE 250 ALT 637 W/ HCPCS: Performed by: HOSPITALIST

## 2022-03-30 RX ORDER — CHOLESTYRAMINE 4 G/9G
1 POWDER, FOR SUSPENSION ORAL 2 TIMES DAILY
Status: DISCONTINUED | OUTPATIENT
Start: 2022-03-30 | End: 2022-04-01 | Stop reason: HOSPADM

## 2022-03-30 RX ADMIN — ISOSORBIDE MONONITRATE 30 MG: 30 TABLET, EXTENDED RELEASE ORAL at 08:03

## 2022-03-30 RX ADMIN — TICAGRELOR 90 MG: 90 TABLET ORAL at 08:03

## 2022-03-30 RX ADMIN — Medication 1 CAPSULE: at 08:03

## 2022-03-30 RX ADMIN — TIOTROPIUM BROMIDE INHALATION SPRAY 2 PUFF: 3.12 SPRAY, METERED RESPIRATORY (INHALATION) at 08:03

## 2022-03-30 RX ADMIN — FERROUS SULFATE TAB 325 MG (65 MG ELEMENTAL FE) 1 EACH: 325 (65 FE) TAB at 08:03

## 2022-03-30 RX ADMIN — METOPROLOL SUCCINATE 50 MG: 50 TABLET, EXTENDED RELEASE ORAL at 08:03

## 2022-03-30 RX ADMIN — FUROSEMIDE 40 MG: 40 TABLET ORAL at 08:03

## 2022-03-30 RX ADMIN — MICONAZOLE NITRATE 2 % TOPICAL POWDER: at 08:03

## 2022-03-30 RX ADMIN — GABAPENTIN 100 MG: 100 CAPSULE ORAL at 08:03

## 2022-03-30 RX ADMIN — PANTOPRAZOLE SODIUM 40 MG: 40 TABLET, DELAYED RELEASE ORAL at 08:03

## 2022-03-30 RX ADMIN — CHOLESTYRAMINE 4 G: 4 POWDER, FOR SUSPENSION ORAL at 02:03

## 2022-03-30 RX ADMIN — Medication 100 MG: at 08:03

## 2022-03-30 RX ADMIN — ASPIRIN 81 MG: 81 TABLET, COATED ORAL at 08:03

## 2022-03-30 RX ADMIN — AMLODIPINE BESYLATE 10 MG: 10 TABLET ORAL at 08:03

## 2022-03-30 RX ADMIN — TRIAMCINOLONE ACETONIDE: 1 CREAM TOPICAL at 08:03

## 2022-03-30 RX ADMIN — CHOLESTYRAMINE 4 G: 4 POWDER, FOR SUSPENSION ORAL at 08:03

## 2022-03-30 RX ADMIN — FLUTICASONE FUROATE AND VILANTEROL TRIFENATATE 1 PUFF: 100; 25 POWDER RESPIRATORY (INHALATION) at 08:03

## 2022-03-30 RX ADMIN — ATORVASTATIN CALCIUM 80 MG: 20 TABLET, FILM COATED ORAL at 08:03

## 2022-03-30 NOTE — PT/OT/SLP PROGRESS
Occupational Therapy   Treatment    Name: Marisol Kerr  MRN: [id number]  Admit Date: 3/18/2022  Admitting Diagnosis:  NSTEMI (non-ST elevated myocardial infarction)    General Precautions: Standard, fall   Orthopedic Precautions:N/A   Braces:       Recommendations:     Discharge Recommendations: home with home health  Level of Assistance Recommended at Discharge: Intermittent assistance for ADL's and homemaking tasks  Discharge Equipment Recommendations:  walker, rolling  Barriers to discharge:  Decreased caregiver support    Assessment:     Marisol Kerr is a 76 y.o. female with a medical diagnosis of NSTEMI (non-ST elevated myocardial infarction).  She presents with  Performance deficits affecting function are are weakness, impaired endurance, impaired self care skills, impaired functional mobilty, gait instability, pain, impaired cardiopulmonary response to activity, impaired balance.     Pt. Was cooperative and participated well with session on this day. Pt  continues to demonstrate levels of physical deficits with  functional indep with daily management activities tasks, selfcare skills with balance,  functional mobility, UB strength and endurance. Pt. Will continue to benefit from continued OT to progress towards goals     Rehab Potential is fair    Activity tolerance:  Fair    Plan:     Patient to be seen 6 x/week to address the above listed problems via self-care/home management, therapeutic activities, therapeutic exercises    · Plan of Care Expires: 04/18/22  · Plan of Care Reviewed with: patient    Subjective     Communicated with: nsg and Pt. prior to session. I am doing well I leave tomorrow     Pain/Comfort:  Pain Rating 1: 0/10  Pain Rating Post-Intervention 1: 0/10    Patient's cultural, spiritual, Nondenominational conflicts given the current situation:  no    Objective:     Patient found up in chair with oxygen upon OT entry to room.    Functional Mobility/Transfers:  · Patient completed Sit <> Stand Transfer  with stand by assistance  with  rolling walker  with RW and cues for safety     Activities of Daily Living:  · Declined pt already dressed     [facility] 6 Click ADL: 19     Treatment & Education:  Pt. With standing act on this day with task. Pt. With SBA for balance aspects with task with  AD at raised counter Pt with visual perception task with discrimination of various shapes and sizes x 9  min with standing bal and min cues through out with weight shifting and use of BUE's incorporated and crossing mid line and facilitation with posture in prep for home management .     Pt. With 1# dumb bell  with 2x20 reps with  shd flex with RUE only , bicep curls horz adb/add and forward flex motion to increase BUE ROM and strength,    Pt. With therex performed to increase ROM, endurance selfcare task and fxl mobility for independence   Pt edu on role of OT, POC, safety when performing self care tasks , benefit of performing OOB activity, and safety when performing functional transfers and mobility management for preparation with goals to progress towards next level of care    Patient left up in chair with all lines intact and call button in reachEducation:      GOALS:   Multidisciplinary Problems     Occupational Therapy Goals        Problem: Occupational Therapy Goal    Goal Priority Disciplines Outcome Interventions   Occupational Therapy Goal     OT, PT/OT Ongoing, Progressing    Description: Goals to be met by: 04/01/22     Patient will increase functional independence with ADLs by performing:    UE Dressing with Modified [place].  LE Dressing with Modified [place] using AD.  Grooming while standing at sink with Modified [place].  Toileting from bedside commode with Modified [place] for hygiene and clothing management.   Bathing from  shower chair/bench with Minimal Assistance.  Toilet transfer to bedside commode with Modified [place].  Upper extremity exercise program 3x15 reps per handout, with  supervision to increase UE strength/endurance for improved func mobility skills. Ongoing  Stand during functional task for 10' with S in preparation for increased independence during standing ADLs..                     Time Tracking:     OT Date of Treatment: OT Date of Treatment: 03/30/22  OT Total Time (min):      Billable Minutes:Therapeutic Activity 34    3/30/2022

## 2022-03-30 NOTE — PLAN OF CARE
Referral to [facility] sent w/out orders.    Marisol Elaine, [facility]  Case Management Department  [facility]  [email or web address]

## 2022-03-31 LAB
ALBUMIN SERPL BCP-MCNC: 3 G/DL (ref 3.5–5.2)
ALP SERPL-CCNC: 94 U/L (ref 55–135)
ALT SERPL W/O P-5'-P-CCNC: 26 U/L (ref 10–44)
ANION GAP SERPL CALC-SCNC: 9 MMOL/L (ref 8–16)
AST SERPL-CCNC: 18 U/L (ref 10–40)
BASOPHILS # BLD AUTO: 0.04 K/UL (ref 0–0.2)
BASOPHILS NFR BLD: 0.6 % (ref 0–1.9)
BILIRUB DIRECT SERPL-MCNC: 0.2 MG/DL (ref 0.1–0.3)
BILIRUB SERPL-MCNC: 0.4 MG/DL (ref 0.1–1)
BUN SERPL-MCNC: 68 MG/DL (ref 8–23)
CALCIUM SERPL-MCNC: 9 MG/DL (ref 8.7–10.5)
CHLORIDE SERPL-SCNC: 101 MMOL/L (ref 95–110)
CO2 SERPL-SCNC: 29 MMOL/L (ref 23–29)
CREAT SERPL-MCNC: 1.5 MG/DL (ref 0.5–1.4)
DIFFERENTIAL METHOD: ABNORMAL
EOSINOPHIL # BLD AUTO: 0.1 K/UL (ref 0–0.5)
EOSINOPHIL NFR BLD: 1.9 % (ref 0–8)
ERYTHROCYTE [DISTWIDTH] IN BLOOD BY AUTOMATED COUNT: 13.7 % (ref 11.5–14.5)
EST. GFR  (AFRICAN AMERICAN): 39.3 ML/MIN/1.73 M^2
EST. GFR  (NON AFRICAN AMERICAN): 34.1 ML/MIN/1.73 M^2
GLUCOSE SERPL-MCNC: 91 MG/DL (ref 70–110)
HCT VFR BLD AUTO: 29.8 % (ref 37–48.5)
HGB BLD-MCNC: 9.3 G/DL (ref 12–16)
IMM GRANULOCYTES # BLD AUTO: 0.03 K/UL (ref 0–0.04)
IMM GRANULOCYTES NFR BLD AUTO: 0.5 % (ref 0–0.5)
LYMPHOCYTES # BLD AUTO: 1.3 K/UL (ref 1–4.8)
LYMPHOCYTES NFR BLD: 20.7 % (ref 18–48)
MAGNESIUM SERPL-MCNC: 1.8 MG/DL (ref 1.6–2.6)
MCH RBC QN AUTO: 29.2 PG (ref 27–31)
MCHC RBC AUTO-ENTMCNC: 31.2 G/DL (ref 32–36)
MCV RBC AUTO: 94 FL (ref 82–98)
MONOCYTES # BLD AUTO: 0.5 K/UL (ref 0.3–1)
MONOCYTES NFR BLD: 8.4 % (ref 4–15)
NEUTROPHILS # BLD AUTO: 4.4 K/UL (ref 1.8–7.7)
NEUTROPHILS NFR BLD: 67.9 % (ref 38–73)
NRBC BLD-RTO: 0 /100 WBC
PHOSPHATE SERPL-MCNC: 3.5 MG/DL (ref 2.7–4.5)
PLATELET # BLD AUTO: 132 K/UL (ref 150–450)
PMV BLD AUTO: 13 FL (ref 9.2–12.9)
POTASSIUM SERPL-SCNC: 3.8 MMOL/L (ref 3.5–5.1)
PROT SERPL-MCNC: 5.7 G/DL (ref 6–8.4)
RBC # BLD AUTO: 3.18 M/UL (ref 4–5.4)
SODIUM SERPL-SCNC: 139 MMOL/L (ref 136–145)
WBC # BLD AUTO: 6.43 K/UL (ref 3.9–12.7)

## 2022-03-31 PROCEDURE — 99900035 HC TECH TIME PER 15 MIN (STAT)

## 2022-03-31 PROCEDURE — 80048 BASIC METABOLIC PNL TOTAL CA: CPT | Performed by: HOSPITALIST

## 2022-03-31 PROCEDURE — 84100 ASSAY OF PHOSPHORUS: CPT | Performed by: HOSPITALIST

## 2022-03-31 PROCEDURE — 85025 COMPLETE CBC W/AUTO DIFF WBC: CPT | Performed by: HOSPITALIST

## 2022-03-31 PROCEDURE — 25000003 PHARM REV CODE 250: Performed by: HOSPITALIST

## 2022-03-31 PROCEDURE — 11000004 HC SNF PRIVATE

## 2022-03-31 PROCEDURE — 97530 THERAPEUTIC ACTIVITIES: CPT | Mod: CQ

## 2022-03-31 PROCEDURE — 27000221 HC OXYGEN, UP TO 24 HOURS

## 2022-03-31 PROCEDURE — 94761 N-INVAS EAR/PLS OXIMETRY MLT: CPT

## 2022-03-31 PROCEDURE — 80076 HEPATIC FUNCTION PANEL: CPT | Performed by: HOSPITALIST

## 2022-03-31 PROCEDURE — 25000003 PHARM REV CODE 250: Performed by: NURSE PRACTITIONER

## 2022-03-31 PROCEDURE — 25000242 PHARM REV CODE 250 ALT 637 W/ HCPCS: Performed by: HOSPITALIST

## 2022-03-31 PROCEDURE — 97535 SELF CARE MNGMENT TRAINING: CPT | Mod: CO

## 2022-03-31 PROCEDURE — 97116 GAIT TRAINING THERAPY: CPT

## 2022-03-31 PROCEDURE — 36415 COLL VENOUS BLD VENIPUNCTURE: CPT | Performed by: HOSPITALIST

## 2022-03-31 PROCEDURE — 83735 ASSAY OF MAGNESIUM: CPT | Performed by: HOSPITALIST

## 2022-03-31 RX ORDER — LISINOPRIL 10 MG/1
10 TABLET ORAL DAILY
Qty: 90 TABLET | Refills: 3 | Status: ON HOLD
Start: 2022-03-31 | End: 2022-04-19 | Stop reason: HOSPADM

## 2022-03-31 RX ORDER — LOVASTATIN 40 MG/1
40 TABLET ORAL EVERY OTHER DAY
Qty: 45 TABLET | Refills: 3
Start: 2022-03-31 | End: 2022-04-07

## 2022-03-31 RX ORDER — CHOLESTYRAMINE 4 G/9G
1 POWDER, FOR SUSPENSION ORAL 2 TIMES DAILY
Qty: 180 PACKET | Refills: 3 | Status: ON HOLD | OUTPATIENT
Start: 2022-03-31 | End: 2022-04-12 | Stop reason: HOSPADM

## 2022-03-31 RX ORDER — IPRATROPIUM BROMIDE AND ALBUTEROL 20; 100 UG/1; UG/1
2 SPRAY, METERED RESPIRATORY (INHALATION) EVERY 4 HOURS PRN
Qty: 12 G | Refills: 3
Start: 2022-03-31 | End: 2022-06-13 | Stop reason: SDUPTHER

## 2022-03-31 RX ORDER — FUROSEMIDE 20 MG/1
40 TABLET ORAL DAILY
Qty: 45 TABLET | Refills: 1 | Status: ON HOLD | OUTPATIENT
Start: 2022-03-31 | End: 2022-04-19 | Stop reason: HOSPADM

## 2022-03-31 RX ORDER — ISOSORBIDE MONONITRATE 30 MG/1
30 TABLET, EXTENDED RELEASE ORAL DAILY
Qty: 30 TABLET | Refills: 3 | Status: ON HOLD | OUTPATIENT
Start: 2022-04-01 | End: 2022-04-19 | Stop reason: HOSPADM

## 2022-03-31 RX ORDER — FLUTICASONE PROPIONATE AND SALMETEROL 250; 50 UG/1; UG/1
1 POWDER RESPIRATORY (INHALATION) 2 TIMES DAILY
Qty: 3 EACH | Refills: 1 | Status: ON HOLD
Start: 2022-03-31 | End: 2023-02-02 | Stop reason: HOSPADM

## 2022-03-31 RX ORDER — UMECLIDINIUM 62.5 UG/1
62.5 AEROSOL, POWDER ORAL EVERY MORNING
Qty: 30 EACH | Refills: 11
Start: 2022-03-31 | End: 2022-06-13 | Stop reason: SDUPTHER

## 2022-03-31 RX ORDER — GABAPENTIN 100 MG/1
100 CAPSULE ORAL NIGHTLY
Qty: 30 CAPSULE | Refills: 3 | Status: SHIPPED | OUTPATIENT
Start: 2022-03-31 | End: 2022-08-16 | Stop reason: SDUPTHER

## 2022-03-31 RX ORDER — FERROUS SULFATE 325(65) MG
325 TABLET, DELAYED RELEASE (ENTERIC COATED) ORAL DAILY
Refills: 0 | COMMUNITY
Start: 2022-03-31 | End: 2022-06-29

## 2022-03-31 RX ADMIN — TICAGRELOR 90 MG: 90 TABLET ORAL at 08:03

## 2022-03-31 RX ADMIN — METOPROLOL SUCCINATE 50 MG: 50 TABLET, EXTENDED RELEASE ORAL at 08:03

## 2022-03-31 RX ADMIN — MELATONIN TAB 3 MG 6 MG: 3 TAB at 08:03

## 2022-03-31 RX ADMIN — PANTOPRAZOLE SODIUM 40 MG: 40 TABLET, DELAYED RELEASE ORAL at 08:03

## 2022-03-31 RX ADMIN — MICONAZOLE NITRATE 2 % TOPICAL POWDER: at 08:03

## 2022-03-31 RX ADMIN — TRIAMCINOLONE ACETONIDE: 1 CREAM TOPICAL at 08:03

## 2022-03-31 RX ADMIN — CHOLESTYRAMINE 4 G: 4 POWDER, FOR SUSPENSION ORAL at 08:03

## 2022-03-31 RX ADMIN — Medication 1 CAPSULE: at 08:03

## 2022-03-31 RX ADMIN — SENNOSIDES AND DOCUSATE SODIUM 1 TABLET: 50; 8.6 TABLET ORAL at 08:03

## 2022-03-31 RX ADMIN — FUROSEMIDE 40 MG: 40 TABLET ORAL at 08:03

## 2022-03-31 RX ADMIN — ISOSORBIDE MONONITRATE 30 MG: 30 TABLET, EXTENDED RELEASE ORAL at 08:03

## 2022-03-31 RX ADMIN — ATORVASTATIN CALCIUM 80 MG: 20 TABLET, FILM COATED ORAL at 08:03

## 2022-03-31 RX ADMIN — FLUTICASONE FUROATE AND VILANTEROL TRIFENATATE 1 PUFF: 100; 25 POWDER RESPIRATORY (INHALATION) at 08:03

## 2022-03-31 RX ADMIN — FERROUS SULFATE TAB 325 MG (65 MG ELEMENTAL FE) 1 EACH: 325 (65 FE) TAB at 08:03

## 2022-03-31 RX ADMIN — ASPIRIN 81 MG: 81 TABLET, COATED ORAL at 08:03

## 2022-03-31 RX ADMIN — GABAPENTIN 100 MG: 100 CAPSULE ORAL at 08:03

## 2022-03-31 RX ADMIN — AMLODIPINE BESYLATE 10 MG: 10 TABLET ORAL at 08:03

## 2022-03-31 RX ADMIN — Medication 100 MG: at 08:03

## 2022-03-31 RX ADMIN — TIOTROPIUM BROMIDE INHALATION SPRAY 2 PUFF: 3.12 SPRAY, METERED RESPIRATORY (INHALATION) at 08:03

## 2022-03-31 NOTE — PT/OT/SLP PROGRESS
"Physical Therapy Treatment    Patient Name:  Marisol Kerr   MRN:  [id number]  Admit Date: 3/18/2022  Admitting Diagnosis: NSTEMI (non-ST elevated myocardial infarction)  Recent Surgeries: N/A    General Precautions: Standard, fall   Orthopedic Precautions:N/A   Braces: N/A     Recommendations:     Discharge Recommendations:  home with home health   Level of Assistance Recommended at Discharge: Intermittent assistance   Discharge Equipment Recommendations: walker, rolling   Barriers to discharge: Decreased caregiver support    Assessment:     Marisol Kerr is a 76 y.o. female admitted with a medical diagnosis of NSTEMI (non-ST elevated myocardial infarction) .     Pt tolerated today's therapy session well. Pt required occasional rest break secondary to feeling SOB but able to recover easily with rest. Pt demonstrated good form with curb step with occasional verbal cues to sequencing. Pt is progressing well.    Performance deficits affecting function:  weakness, impaired endurance, impaired self care skills, impaired functional mobilty, gait instability, impaired balance, decreased upper extremity function, decreased lower extremity function, pain .    Rehab Potential is good    Activity Tolerance: Good    Plan:     Patient to be seen 5 x/week to address the above listed problems via gait training, therapeutic activities, therapeutic exercises, neuromuscular re-education, wheelchair management/training    · Plan of Care Expires: 04/18/22  · Plan of Care Reviewed with: patient    Subjective     "can you get me some clothes".     Pain/Comfort:  · Pain Rating 1: 0/10  · Pain Rating Post-Intervention 1: 0/10    Patient's cultural, spiritual, Mormon conflicts given the current situation:  · no    Objective:     Patient found up in chair with oxygen upon PT entry to room.     Therapeutic Activities and Exercises:   · Patient educated on role of therapy, goals of session, and benefits of out of bed mobility.   · Instructed on " "use of call button and importance of calling nursing staff for assistance with mobility   · Questions/concerns addressed within PTA scope of practice   Pt verbalized understanding.    Functional Mobility:  · Bed Mobility:     · Rolling Left:  stand by assistance  · Rolling Right: stand by assistance  · Scooting: stand by assistance  · Supine to Sit: stand by assistance with HOB elevated   · Sit to Supine: stand by assistance  · Transfers:     · Sit to Stand:  stand by assistance with rolling walker   · Chair to bed: stand by assistance with  hand-held assist  using  Stand Pivot  · Gait: pt ambulated 110ft + 95ft with RW and SBA and oxygen in towed. Pt demonstrated mildly unsteady gait with forward flexed posture, narrow DOUG, and decrease esther. Verbal cues for hand placement and keep RW close. 1 seated rest break and no LOB occurred.   · Stairs:  Pt ascended/descended 3.5" curb step  with Rolling Walker with no handrails with Contact Guard Assistance. Verbal cues for sequencing and feet placement.     AM-PAC 6 CLICK MOBILITY  21    Patient left up in chair with call button in reach.    GOALS:   Multidisciplinary Problems     Physical Therapy Goals        Problem: Physical Therapy Goal    Goal Priority Disciplines Outcome Goal Variances Interventions   Physical Therapy Goal     PT, PT/OT Ongoing, Progressing     Description: Goals to be met by: 22     Patient will increase functional independence with mobility by performin. Supine to sit with Set-up [place]  2. Sit to supine with Set-up [place]  3. Rolling to Left and Right with Supervision.  4. Sit to stand transfer with Supervision- Met 3/28/2022  5. Bed to chair transfer with Supervision using Rolling Walker- Met 3/28/2022  6. Gait  x 150 feet with Supervision using Rolling Walker.   7. Wheelchair propulsion x75 feet with Supervision using bilateral uppper extremities- Met 3/28/2022  8. Ascend/Descend 4 inch curb step with Stand-by " Assistance using Rolling Walker.  9. Pt in standing will use a reacher to  an item from the floor w/ a RW and SPV    New Goals added 3/28/2022  10. Gait x100ft w/ rollator and SPV                     Time Tracking:     PT Received On: 03/31/22  PT Total Time (min):  39      Billable Minutes: Gait Training 15 and Therapeutic Activity 24    Treatment Type: Treatment  PT/PTA: PTA     PTA Visit Number: 2     03/31/2022

## 2022-03-31 NOTE — PLAN OF CARE
Interdisciplinary team, Mirian Stanley, RN Nurse Manager, Radha Rodriguez, RN Charge Nurse, García Marina, Unit Director, Alisa Nuñez, RN MDS Coordinator, Eliane Foster PT, Rehab Supervisor, Marisol Elaine LMSW, Racquel Nur, [place], and Marisol Davis, Dietician, spoke to patient for care plan conference, weekly status update, and therapy progress update. Tentative discharge date set for 4/1/22.

## 2022-03-31 NOTE — PT/OT/SLP PROGRESS
Occupational Therapy   Treatment/Discharge Summary    Name: Marisol Kerr  MRN: [id number]  Admit Date: 3/18/2022  Admitting Diagnosis:  Chronic Diastolic Heart Failure    General Precautions: Standard, fall   Orthopedic Precautions:N/A   Braces:       Recommendations:     Discharge Recommendations: home with home health  Level of Assistance Recommended at Discharge: Intermittent supervision  Discharge Equipment Recommendations:  walker, rolling  Barriers to discharge:  Decreased caregiver support    Assessment:     Marisol Kerr is a 76 y.o. female with a medical diagnosis of Chronic Diastolic Heart Failure  She presents with  Performance deficits affecting function are  weakness, impaired endurance, impaired sensation, impaired self care skills, impaired functional mobilty, gait instability, impaired balance, decreased coordination, decreased upper extremity function, decreased lower extremity function, pain, decreased safety awareness, decreased ROM, impaired skin, impaired cardiopulmonary response to activity.   Pt. Was cooperative and participated well with session on this day. Pt. With mild exertion thorough task on this day mini breaks in between Pt  continues to demonstrate levels of physical deficits with  functional indep with daily management activities tasks, selfcare skills with balance,  functional mobility, UB strength and endurance. Pt. Will continue to benefit from continued OT to progress towards goals with next level of care     Rehab Potential is fair    Activity tolerance:  Fair    Plan:     Patient to be seen 6 x/week to address the above listed problems via self-care/home management, therapeutic activities, therapeutic exercises    · Plan of Care Expires: 04/18/22  · Plan of Care Reviewed with: patient    Subjective     Communicated with: nsg and Pt. prior to session. I am going home tomorrow     Pain/Comfort:  · Pain Rating 1: 0/10  · Pain Rating Post-Intervention 1: 0/10       Patient's cultural,  spiritual, Tenriism conflicts given the current situation:  no    Objective:     Patient found up in chair with  oxygen upon OT entry to room.    Functional Mobility/Transfers:  · Patient completed Sit <> Stand Transfer with contact guard assistance  with  rolling walker   · Patient completed Toilet Transfer Step Transfer technique with contact guard assistance with  rolling walker    Activities of Daily Living:  · Grooming: supervision seaated at sink level with grooming needs  · Bathing: minimum assistance standing/seated at sink level with bathing needs  · Upper Body Dressing: Modified independence to doff/beth pull over shirt  · Lower Body Dressing: Contact guard  assistance to beth pants seated and to manage over hips instace with RW for balance and (A) for BLE socks  · Toileting: stand by assistance with cleaning and clothing  And (A) with diaper mangement     [facility] 6 Click ADL: 19    Treatment & Education:  Pt edu on role of OT, POC, safety when performing self care tasks , benefit of performing OOB activity, and safety when performing functional transfers and mobility management for preparation with goals to progress towards next level of care    Patient left up in chair with all lines intact and call button in reachEducation:      GOALS:   Multidisciplinary Problems     Occupational Therapy Goals        Problem: Occupational Therapy Goal    Goal Priority Disciplines Outcome Interventions   Occupational Therapy Goal     OT, PT/OT Ongoing, Progressing    Description: Goals to be met by: 04/01/22     Patient will increase functional independence with ADLs by performing:    UE Dressing with Modified [place].-MET  LE Dressing with Modified [place] using AD.-Not MET  Grooming while standing at sink with Modified [place].-Not MET  Toileting from bedside commode with Modified [place] for hygiene and clothing management Not MET.   Bathing from  shower chair/bench with Minimal  Assistance.-MET  Toilet transfer to bedside commode with Modified [place].-Not MET  Upper extremity exercise program 3x15 reps per handout, with supervision to increase UE strength/endurance for improved func mobility skills. Ongoing-MET  Stand during functional task for 10' with S in preparation for increased independence during standing ADLs..-MET                     Time Tracking:     OT Date of Treatment: OT Date of Treatment: 03/31/22  OT Total Time (min):      Billable Minutes:Self Care/Home Management 42    3/31/2022  A client care conference was performed between the FREDR and TYLER, prior to treatment to discuss the patient's status, treatment plan and established goals.

## 2022-03-31 NOTE — PROGRESS NOTES
[facility]                                  Skilled Nursing Facility                   Progress Note     Admit Date: 3/18/2022  LUZ  TBD  Principal Problem:  NSTEMI (non-ST elevated myocardial infarction)   HPI obtained from patient interview and chart review     Chief Complaint: Re-evaluation of medical treatment and therapy status: Lab review, re-evaluation of renal function    HPI:   Ms Kerr is a 76 year old female with PMHx of DM2, HTN, HLD, CKD, COPD on home O2 who presents SNF following hospitalization for NSTEMI.  Admission to SNF for secondary to weakness and debility.    Interval history:All of today's labs reviewed and are listed below.  Renal function 68/1.5.  H&H stable.  24 hr vital sign ranges listed below.  Patient denies shortness of breath, abdominal discomfort, nausea, or vomiting.  Patient reports an adequate appetite.  Patient denies dysuria.  Patient reports having regular bowel movements.  Patient progessing with PT/OT- Gait: pt ambulated 110ft + 95ft with RW and SBA and oxygen in towed. Pt demonstrated mildly unsteady gait with forward flexed posture, narrow DOUG, and decrease esther. Verbal cues for hand placement and keep RW close. 1 seated rest break and no LOB occurred.    . Continuing to follow and treat all acute and chronic conditions.     Past Medical History: Patient has a past medical history of CAD (coronary artery disease), Cancer, CHF (congestive heart failure), Colitis, Colon cancer, COPD (chronic obstructive pulmonary disease), Decreased hearing, Diabetes mellitus, Diabetes mellitus, type 2, Hypercholesterolemia, Hypertension, Hypoxemia, Insomnia, Obesity, and Osteoarthritis.    Past Surgical History: Patient has a past surgical history that includes External ear surgery; Colectomy; Cholecystectomy; Flexible sigmoidoscopy (N/A, 9/19/2019); Eye surgery; Hysterectomy; Coronary stent placement; Cardiac  catheterization; Coronary angioplasty; and ANGIOGRAM, CORONARY, WITH LEFT HEART CATHETERIZATION (N/A, 2/10/2022).    Social History: Patient reports that she quit smoking about 16 years ago. Her smoking use included cigarettes. She started smoking about 58 years ago. She has a 150.00 pack-year smoking history. She has never used smokeless tobacco. She reports current alcohol use. She reports that she does not use drugs.    Family History: family history includes Febrile seizures in her mother; Heart failure in her father.    Allergies: Patient is allergic to iodinated contrast media and strawberries [strawberry].    ROS  Constitutional: Negative for fever   Eyes: Negative for blurred vision, double vision   Respiratory: Negative for cough, shortness of breath   Cardiovascular: Negative for chest pain, palpitations, and leg swelling.   Gastrointestinal: Negative for abdominal pain, constipation, diarrhea, nausea, vomiting.   Genitourinary:  Negative for dysuria, frequency   Musculoskeletal:  + generalized weakness.  +left hip pain, buttocks pain, left shoulder pain  Skin: Negative for itching and rash.   Neurological: Negative for dizziness, headaches.   Psychiatric/Behavioral: Negative for depression. The patient is not nervous/anxious.      24 hour Vital Sign Range   Temp:  [98.3 °F (36.8 °C)]   Pulse:  [55-61]   Resp:  [18]   BP: (122-150)/(54-65)   SpO2:  [96 %-99 %]     PEx  Constitutional: Patient appears debilitated.  No distress noted  HENT:   Head: Normocephalic and atraumatic.   Eyes: Pupils are equal, round  Neck: Normal range of motion. Neck supple.   Cardiovascular: Normal rate, regular rhythm and normal heart sounds.    Pulmonary/Chest: Effort normal and breath sounds are clear with diminished bases.  Supplemental oxygen in progress  Abdominal: Soft. Bowel sounds are normal.   Musculoskeletal: Normal range of motion.   Neurological: Alert and oriented to person, place, and time.   Psychiatric: Normal  mood and affect. Behavior is normal.   Skin: Skin is warm and dry.  PVD changes to bilateral lower extremities.  Skin breakdown to buttocks    Recent Labs   Lab 03/31/22  0503      K 3.8      CO2 29   BUN 68*   CREATININE 1.5*   MG 1.8       Recent Labs   Lab 03/31/22  0503   WBC 6.43   RBC 3.18*   HGB 9.3*   HCT 29.8*   *   MCV 94   MCH 29.2   MCHC 31.2*         Assessment and Plan:      Hypomagnesemia  - initiated magnesium oxide 400 mg x 1    CKD (chronic kidney disease), stage III  - Baseline sCr 1.5- 1.8   - at baseline, continue to hold lisinopril as BP's are low to normal and patient with hyperkalemia, continue to monitor twice weekly BMPs, avoid nephrotoxic agents, renally dose medications when appropriate    Acute on chronic diastolic congestive heart failure  - TTE notable for Grade II left ventricular diastolic dysfunction, EF 65%  - admitted with Lasix 40 mg BID, lisinopril 10 mg daily  - Cardiac diet with Fluid restriction at 1.5L with strict I/Os and daily STANDING weights  - continue Lasix 40 mg daily    Partial thickness tissue loss  - triad to gluteal cleft, ordered for wound care consult    NSTEMI (non-ST elevated myocardial infarction)  Coronary artery disease,  multivessel with history of previous PCI  - Recent hx of increasing episodes of angina requiring more frequent Nitroglycerin use and underwent LHC at OSH.   -Recent Left heart cath[02/11/22] showed:  · The RPAV lesion was 100% stenosed.  · The RPDA lesion was 100% stenosed.  · The Prox Cx to Mid Cx lesion was 80% stenosed.  · The 1st LPL lesion was 90% stenosed.  · The Prox RCA-2 lesion was 70% stenosed.  · The Prox RCA-1 lesion was 90% stenosed.  · The Mid LAD-2 lesion was 60% stenosed.  - continue 81 mg ASA qd, Atorvastatin 80mg qhs, Metoprolol 50 mg XL, Ticagrelor 90mg bid  - LHC deferred given rectus sheath hematoma. Plan for IC intervention 1-2 weeks after admission for outpatient.  Coronary atherosclerosis  - 3/8  required dose of nitroglycerin today, symptom resolve min after 1 administration    Essential hypertension, benign  - home regimen with amlodipine 5 mg daily, benazepril 40 mg daily  - continue amlodipine 10 mg daily, lisinopril 10 mg daily, metoprolol XL 50 mg daily  - 3/15 discontinuing lisinopril for low to normal BP's, hyperkalemia    COPD/emphysema  - Known history of severe COPD (GOLD IV D PFT 2020). On home oxygen (2-3L)  - Home medications: albuterol (VENTOLIN HFA) 90 mcg/actuation inhaler, ipratropium-albuteroL (COMBIVENT RESPIMAT)  mcg/actuation inhaler,  umeclidinium (INCRUSE ELLIPTA) 62.5 mcg/actuation inhalation capsule, fluticasone-salmeterol diskus inhaler 250-50 mcg  - continue Breo and Spiriva inhaler   - DuoNebs PRN    DM type 2, controlled, with complication  - last A1c 5.2 on 01/27/2022  - diabetic diet, Accu-Cheks AC/HS  - continue LDSSI PRN     Gastroesophageal reflux disease without esophagitis  - continue Protonix 40 mg daily    Hypercholesterolemia  - continue Atorvastatin 40mg    Obesity  - BMI currently 33.94  - RD following, weight loss encouraged    Rectus sheath hematoma  - Patient had severe abdominal pain the day before with a mass on the abdomen. Patient was unable to have a BM yesterday. KUB was drawn and showed a large stool burden. Continued bowel regimen but pain was persistent. CT abdomen without contrast and lactic drawn. CT abdomen showed rectal sheath hematoma extending to the pelvis. IR consulted and recommended CTA of the abdomen to identify bleeding vessel. Due to kidney function, held off on CTA for concerns of contrast induced nephropathy as patient had a Alfredo score of 16 with 100cc of contrast, 15 with 75 cc contrast. IR consulted and following. Hgb went from 13 on admission --> 10.6 --> 9.9 --> 8.9. hematoma was expanding. IR performed embolization of inferior epigastric and collateral vessels.   - continue to monitor abdominal mass     Venous stasis  - Chronic  history of venous stasis of bilateral lower extremities limited to breakdown of skin without varicose veins  - Patient denies increase in leg swelling over her baseline  - wound care RN following    Vitamin-D deficiency  - continue ergocalciferol 74531 units weekly    Debility   - Continue with PT/OT for gait training and strengthening and restoration of ADL's   - Encourage mobility, OOB in chair, and early ambulation as appropriate  - Fall precautions   - Monitor for bowel and bladder dysfunction  - Monitor for and prevent skin breakdown and pressure ulcers  - Continue DVT prophylaxis with ASA/ticagrelor, frequent ambulation         Anticipate disposition:  Home with home health      Follow-up needed during SNF stay- interventional cardiology (3/17)    Follow-up needed after discharge from SNF: PCP,     Future Appointments   Date Time Provider Department Center   4/7/2022  1:40 PM Scott Chappell MD [facility]   4/27/2022 11:20 AM Antonieta Marquez NP [facility]       Natalie Clemons NP  Department of Hospital Medicine   [facility]     DOS:3/31/2022      Patient note was created using MModal Dictation.  Any errors in syntax or even information may not have been identified and edited on initial review prior to signing this note.

## 2022-03-31 NOTE — PROGRESS NOTES
[facility]                                  Skilled Nursing Facility                   Progress Note     Admit Date: 3/18/2022  LUZ  TBD  Principal Problem:  NSTEMI (non-ST elevated myocardial infarction)   HPI obtained from patient interview and chart review     Chief Complaint: Re-evaluation of medical treatment and therapy status: Lab review, hypomagnesemia, hypokalemia    HPI:   Ms Kerr is a 76 year old female with PMHx of DM2, HTN, HLD, CKD, COPD on home O2 who presents SNF following hospitalization for NSTEMI.  Admission to SNF for secondary to weakness and debility.    Interval history:All of 3/28 labs reviewed- , K 3.7, BUN/creatinine 77/1.6, Mag 1.5, WBC 7.26, H&H 9.5/30, platelets 145.  24 hr vital sign ranges listed below.  Patient states her breathing is at her baseline.  Patient denies chest pain at this time.  Patient denies shortness of breath, abdominal discomfort, nausea, or vomiting.  Patient reports an adequate appetite.  Patient denies dysuria.  Patient reports having regular bowel movements.  Patient progessing with PT/OT. Continuing to follow and treat all acute and chronic conditions.    Past Medical History: Patient has a past medical history of CAD (coronary artery disease), Cancer, CHF (congestive heart failure), Colitis, Colon cancer, COPD (chronic obstructive pulmonary disease), Decreased hearing, Diabetes mellitus, Diabetes mellitus, type 2, Hypercholesterolemia, Hypertension, Hypoxemia, Insomnia, Obesity, and Osteoarthritis.    Past Surgical History: Patient has a past surgical history that includes External ear surgery; Colectomy; Cholecystectomy; Flexible sigmoidoscopy (N/A, 9/19/2019); Eye surgery; Hysterectomy; Coronary stent placement; Cardiac catheterization; Coronary angioplasty; and ANGIOGRAM, CORONARY, WITH LEFT HEART CATHETERIZATION (N/A, 2/10/2022).    Social History: Patient reports that she  quit smoking about 16 years ago. Her smoking use included cigarettes. She started smoking about 58 years ago. She has a 150.00 pack-year smoking history. She has never used smokeless tobacco. She reports current alcohol use. She reports that she does not use drugs.    Family History: family history includes Febrile seizures in her mother; Heart failure in her father.    Allergies: Patient is allergic to iodinated contrast media and strawberries [strawberry].    ROS  Constitutional: Negative for fever   Eyes: Negative for blurred vision, double vision   Respiratory: Negative for cough, shortness of breath   Cardiovascular: Negative for chest pain, palpitations, and leg swelling.   Gastrointestinal: Negative for abdominal pain, constipation, diarrhea, nausea, vomiting.   Genitourinary:  Negative for dysuria, frequency   Musculoskeletal:  + generalized weakness.  +left hip pain, buttocks pain, left shoulder pain  Skin: Negative for itching and rash.   Neurological: Negative for dizziness, headaches.   Psychiatric/Behavioral: Negative for depression. The patient is not nervous/anxious.      24 hour Vital Sign Range   Temp:  [98.3 °F (36.8 °C)]   Pulse:  [55-61]   Resp:  [18]   BP: (122-150)/(54-65)   SpO2:  [96 %-99 %]     PEx  Constitutional: Patient appears debilitated.  No distress noted  HENT:   Head: Normocephalic and atraumatic.   Eyes: Pupils are equal, round  Neck: Normal range of motion. Neck supple.   Cardiovascular: Normal rate, regular rhythm and normal heart sounds.    Pulmonary/Chest: Effort normal and breath sounds are clear with diminished bases.  Supplemental oxygen in progress  Abdominal: Soft. Bowel sounds are normal.   Musculoskeletal: Normal range of motion.   Neurological: Alert and oriented to person, place, and time.   Psychiatric: Normal mood and affect. Behavior is normal.   Skin: Skin is warm and dry.  PVD changes to bilateral lower extremities.  Skin breakdown to buttocks    Recent Labs   Lab  03/31/22  0503      K 3.8      CO2 29   BUN 68*   CREATININE 1.5*   MG 1.8       Recent Labs   Lab 03/31/22  0503   WBC 6.43   RBC 3.18*   HGB 9.3*   HCT 29.8*   *   MCV 94   MCH 29.2   MCHC 31.2*         Assessment and Plan:    Hypokalemia  Hypomagnesemia  - initiated magnesium oxide 400 mg BID x2 days, potassium 20 mEq x1 dose    CKD (chronic kidney disease), stage III  - Baseline sCr 1.5- 1.8   - at baseline, continue to hold lisinopril as BP's are low to normal and patient with hyperkalemia, continue to monitor twice weekly BMPs, avoid nephrotoxic agents, renally dose medications when appropriate    Acute on chronic diastolic congestive heart failure  - TTE notable for Grade II left ventricular diastolic dysfunction, EF 65%  - admitted with Lasix 40 mg BID, lisinopril 10 mg daily  - Cardiac diet with Fluid restriction at 1.5L with strict I/Os and daily STANDING weights  - continue Lasix 40 mg daily    Partial thickness tissue loss  - triad to gluteal cleft, ordered for wound care consult    NSTEMI (non-ST elevated myocardial infarction)  Coronary artery disease,  multivessel with history of previous PCI  - Recent hx of increasing episodes of angina requiring more frequent Nitroglycerin use and underwent LHC at OSH.   -Recent Left heart cath[02/11/22] showed:  · The RPAV lesion was 100% stenosed.  · The RPDA lesion was 100% stenosed.  · The Prox Cx to Mid Cx lesion was 80% stenosed.  · The 1st LPL lesion was 90% stenosed.  · The Prox RCA-2 lesion was 70% stenosed.  · The Prox RCA-1 lesion was 90% stenosed.  · The Mid LAD-2 lesion was 60% stenosed.  - continue 81 mg ASA qd, Atorvastatin 80mg qhs, Metoprolol 50 mg XL, Ticagrelor 90mg bid  - LHC deferred given rectus sheath hematoma. Plan for IC intervention 1-2 weeks after admission for outpatient.  Coronary atherosclerosis  - 3/8 required dose of nitroglycerin today, symptom resolve min after 1 administration    Essential hypertension, benign  -  home regimen with amlodipine 5 mg daily, benazepril 40 mg daily  - continue amlodipine 10 mg daily, lisinopril 10 mg daily, metoprolol XL 50 mg daily  - 3/15 discontinuing lisinopril for low to normal BP's, hyperkalemia    COPD/emphysema  - Known history of severe COPD (GOLD IV D PFT 2020). On home oxygen (2-3L)  - Home medications: albuterol (VENTOLIN HFA) 90 mcg/actuation inhaler, ipratropium-albuteroL (COMBIVENT RESPIMAT)  mcg/actuation inhaler,  umeclidinium (INCRUSE ELLIPTA) 62.5 mcg/actuation inhalation capsule, fluticasone-salmeterol diskus inhaler 250-50 mcg  - continue Breo and Spiriva inhaler   - DuoNebs PRN    DM type 2, controlled, with complication  - last A1c 5.2 on 01/27/2022  - diabetic diet, Accu-Cheks AC/HS  - continue LDSSI PRN     Gastroesophageal reflux disease without esophagitis  - continue Protonix 40 mg daily    Hypercholesterolemia  - continue Atorvastatin 40mg    Obesity  - BMI currently 33.94  - RD following, weight loss encouraged    Rectus sheath hematoma  - Patient had severe abdominal pain the day before with a mass on the abdomen. Patient was unable to have a BM yesterday. KUB was drawn and showed a large stool burden. Continued bowel regimen but pain was persistent. CT abdomen without contrast and lactic drawn. CT abdomen showed rectal sheath hematoma extending to the pelvis. IR consulted and recommended CTA of the abdomen to identify bleeding vessel. Due to kidney function, held off on CTA for concerns of contrast induced nephropathy as patient had a Alfredo score of 16 with 100cc of contrast, 15 with 75 cc contrast. IR consulted and following. Hgb went from 13 on admission --> 10.6 --> 9.9 --> 8.9. hematoma was expanding. IR performed embolization of inferior epigastric and collateral vessels.   - continue to monitor abdominal mass     Venous stasis  - Chronic history of venous stasis of bilateral lower extremities limited to breakdown of skin without varicose veins  -  Patient denies increase in leg swelling over her baseline  - wound care RN following    Vitamin-D deficiency  - continue ergocalciferol 57649 units weekly    Debility   - Continue with PT/OT for gait training and strengthening and restoration of ADL's   - Encourage mobility, OOB in chair, and early ambulation as appropriate  - Fall precautions   - Monitor for bowel and bladder dysfunction  - Monitor for and prevent skin breakdown and pressure ulcers  - Continue DVT prophylaxis with ASA/ticagrelor, frequent ambulation         Anticipate disposition:  Home with home health      Follow-up needed during SNF stay- interventional cardiology (3/17)    Follow-up needed after discharge from SNF: PCP,     Future Appointments   Date Time Provider Department Center   4/7/2022  1:40 PM Scott Chappell MD [facility]   4/27/2022 11:20 AM Antonieta Marquez NP [facility]       Natalie Clemons NP  Department of Hospital Medicine   [facility]     DOS: 3/29/2022       Patient note was created using MModal Dictation.  Any errors in syntax or even information may not have been identified and edited on initial review prior to signing this note.

## 2022-03-31 NOTE — PLAN OF CARE
AAOx3, afebrile. Pt on 2L NC. Labs Monday and Thursday. Triamcinolone applied to legs. Miconazole applied between toes. Triad cream to buttocks. Sacral dressing applied to buttocks. PT/OT consulted. [place] on. Pt able to position self independently in bed. Stand by assist OOB. Pt in lowest position, side rails up x2, non-skid foot wear in place, call light within reach, pt verbalized understanding to call RN when needed.  Hand hygiene practiced per protocol.  Will continue to monitor.

## 2022-03-31 NOTE — PLAN OF CARE
[facility] - Skilled Nursing      HOME HEALTH ORDERS  FACE TO FACE ENCOUNTER    Patient Name: Marisol Kerr  YOB: 1945    PCP: Khadar Dwyer MD   PCP Address: [address] [facility] / [place]  PCP Phone Number: [phone]  PCP Fax: [phone]    Encounter Date: 3/18/22    Admit to Home Health    Diagnoses:  Active Hospital Problems    Diagnosis  POA    *NSTEMI (non-ST elevated myocardial infarction) [I21.4]  Yes    Vitamin D deficiency [E55.9]  Yes    Chronic diastolic congestive heart failure [I50.32]  Yes    Chronic hypoxemic respiratory failure [J96.11]  Yes     3 L at home      COPD/emphysema [J44.9]  Yes     Chronic    Primary osteoarthritis of both hips [M16.0]  Yes    Chronic respiratory failure with hypoxia [J96.11]  Yes    CKD (chronic kidney disease), stage III [N18.30]  Yes    Diabetic peripheral neuropathy [E11.42]  Yes    Coronary artery disease, multivessel with history of previous PCI [I25.10]  Yes    DM type 2, controlled, with complication [E11.8]  Yes    Gastroesophageal reflux disease without esophagitis [K21.9]  Yes    Hypertension [I10]  Yes    Coronary atherosclerosis [I25.10]  Yes    Hypercholesterolemia [E78.00]  Yes      Resolved Hospital Problems   No resolved problems to display.       Follow Up Appointments:  Future Appointments   Date Time Provider Department Center   4/7/2022  1:40 PM Scott Chappell MD [facility]   4/27/2022 11:20 AM Antonieta Marquez NP [facility]       Allergies:  Review of patient's allergies indicates:   Allergen Reactions    Iodinated contrast media     Strawberries [strawberry] Hives and Itching       Medications: Review discharge medications with patient and family and provide education.      I have seen and examined this patient within the last 30 days. My clinical findings that support the need for the home health skilled services and home bound status are the  following:no   Weakness/numbness causing balance and gait disturbance due to Coronary Heart Disease making it taxing to leave home.     Referrals/ Consults  Physical Therapy to evaluate and treat. Evaluate for home safety and equipment needs; Establish/upgrade home exercise program. Perform / instruct on therapeutic exercises, gait training, transfer training, and Range of Motion.  Occupational Therapy to evaluate and treat. Evaluate home environment for safety and equipment needs. Perform/Instruct on transfers, ADL training, ROM, and therapeutic exercises.  Aide to provide assistance with personal care, ADLs, and vital signs.    Activities:   activity as tolerated    Nursing:   Agency to admit patient within 24 hours of hospital discharge unless specified on physician order or at patient request    SN to complete comprehensive assessment including routine vital signs. Instruct on disease process and s/s of complications to report to MD. Review/verify medication list sent home with the patient at time of discharge  and instruct patient/caregiver as needed. Frequency may be adjusted depending on start of care date.     Skilled nurse to perform up to 3 visits PRN for symptoms related to diagnosis    Notify MD if SBP > 160 or < 90; DBP > 90 or < 50; HR > 120 or < 50; Temp > 101; O2 < 88%    Ok to schedule additional visits based on staff availability and patient request on consecutive days within the home health episode.    Miscellaneous   Diabetic Care:   SN to perform and educate Diabetic management with blood glucose monitoring:, Fingerstick blood sugar AC and HS and Report CBG < 60 or > 350 to physician.    Home Oxygen:  No change    Home Health Aide:  Nursing Three times weekly, Physical Therapy Three times weekly, Occupational Therapy Three times weekly and Home Health Aide Three times weekly    Wound Care Orders  no    I certify that this patient is confined to her home and needs intermittent skilled nursing  care, physical therapy and occupational therapy.

## 2022-03-31 NOTE — PLAN OF CARE
SW met with pt and spoke to daughter, also Marisol Kerr, to confirm discharge. No DME needed. Patient's daughter will be transporting pt to her home. Patient has been accepted by [facility].  D/C set for 12 pm.    Marisol Elaine, [facility] [place]  Case Management Department  [facility]  [email or web address]

## 2022-04-01 VITALS
WEIGHT: 173.5 LBS | BODY MASS INDEX: 30.74 KG/M2 | TEMPERATURE: 98 F | OXYGEN SATURATION: 97 % | HEIGHT: 63 IN | SYSTOLIC BLOOD PRESSURE: 136 MMHG | RESPIRATION RATE: 19 BRPM | HEART RATE: 72 BPM | DIASTOLIC BLOOD PRESSURE: 78 MMHG

## 2022-04-01 PROCEDURE — 27000221 HC OXYGEN, UP TO 24 HOURS

## 2022-04-01 PROCEDURE — 94761 N-INVAS EAR/PLS OXIMETRY MLT: CPT

## 2022-04-01 PROCEDURE — 99900035 HC TECH TIME PER 15 MIN (STAT)

## 2022-04-01 PROCEDURE — 25000003 PHARM REV CODE 250: Performed by: HOSPITALIST

## 2022-04-01 PROCEDURE — 25000003 PHARM REV CODE 250: Performed by: NURSE PRACTITIONER

## 2022-04-01 RX ADMIN — SENNOSIDES AND DOCUSATE SODIUM 1 TABLET: 50; 8.6 TABLET ORAL at 09:04

## 2022-04-01 RX ADMIN — TICAGRELOR 90 MG: 90 TABLET ORAL at 09:04

## 2022-04-01 RX ADMIN — METOPROLOL SUCCINATE 50 MG: 50 TABLET, EXTENDED RELEASE ORAL at 09:04

## 2022-04-01 RX ADMIN — CHOLESTYRAMINE 4 G: 4 POWDER, FOR SUSPENSION ORAL at 09:04

## 2022-04-01 RX ADMIN — TIOTROPIUM BROMIDE INHALATION SPRAY 2 PUFF: 3.12 SPRAY, METERED RESPIRATORY (INHALATION) at 09:04

## 2022-04-01 RX ADMIN — ASPIRIN 81 MG: 81 TABLET, COATED ORAL at 09:04

## 2022-04-01 RX ADMIN — AMLODIPINE BESYLATE 10 MG: 10 TABLET ORAL at 09:04

## 2022-04-01 RX ADMIN — MICONAZOLE NITRATE 2 % TOPICAL POWDER: at 09:04

## 2022-04-01 RX ADMIN — Medication 1 CAPSULE: at 09:04

## 2022-04-01 RX ADMIN — ISOSORBIDE MONONITRATE 30 MG: 30 TABLET, EXTENDED RELEASE ORAL at 09:04

## 2022-04-01 RX ADMIN — FERROUS SULFATE TAB 325 MG (65 MG ELEMENTAL FE) 1 EACH: 325 (65 FE) TAB at 09:04

## 2022-04-01 RX ADMIN — FLUTICASONE FUROATE AND VILANTEROL TRIFENATATE 1 PUFF: 100; 25 POWDER RESPIRATORY (INHALATION) at 09:04

## 2022-04-01 RX ADMIN — Medication 100 MG: at 09:04

## 2022-04-01 RX ADMIN — TRIAMCINOLONE ACETONIDE: 1 CREAM TOPICAL at 09:04

## 2022-04-01 RX ADMIN — PANTOPRAZOLE SODIUM 40 MG: 40 TABLET, DELAYED RELEASE ORAL at 09:04

## 2022-04-01 RX ADMIN — FUROSEMIDE 40 MG: 40 TABLET ORAL at 09:04

## 2022-04-01 NOTE — PLAN OF CARE
Patient d/c as planned to her home, she was transported by her daughter Marisol Kerr.  No HME was needed. Home Health has been authorized with [facility].  Patient has follow-up appointment with pcp on 4/7.    [place] [facility]  [place] Management Department  [facility]  [email or web address] - Skilled Nursing  Discharge Final Note    Primary Care Provider: Khadar Dwyer MD    Expected Discharge Date: 4/1/2022    Final Discharge Note (most recent)     Final Note - 04/01/22 1708        Final Note    Assessment Type Final Discharge Note     Anticipated Discharge Disposition Home-Health Care [facility]     Hospital Resources/Appts/Education Provided Provided patient/caregiver with written discharge plan information;Appointments scheduled by Navigator/Coordinator;Post-Acute resouces added to AVS        Post-Acute Status    Post-Acute Authorization Home Health     Home Health Status Set-up Complete/Auth obtained     Discharge Delays None known at this time                 Important Message from Medicare  Important Message from Medicare regarding Discharge Appeal Rights: Given to patient/caregiver, Explained to patient/caregiver, Signed/date by patient/caregiver     Date IMM was signed: 03/29/22  Time IMM was signed: 1055    Contact Info     Khadar Dwyer MD   Specialty: Family Medicine, Home Health Services, Hospice Services   Relationship: PCP - General    [address] [facility]  [address] [place] [id number]   Phone: [phone]       Next Steps: Schedule an appointment as soon as possible for a visit    Instructions: WITHIN 1 WEEK

## 2022-04-01 NOTE — NURSING
Reviewed discharged instructions with patient and patient daughter. Both verbalized understanding of instructions. Patient left SNF with daughter.

## 2022-04-01 NOTE — DISCHARGE SUMMARY
"[facility] Medicine  Discharge Summary      Patient Name: Marisol Kerr  MRN: [id number]  Patient Class: IP- SNF  Admission Date: 3/18/2022  Hospital Length of Stay: 14 days  Discharge Date and Time:  04/01/2022 2:52 PM  Attending Physician: Tressa att. providers found   Discharging Provider: Natalie Clemons NP  Primary Care Provider: Khadar Dwyer MD      HPI:   Ms Kerr is a 76 year old female with PMHx of DM2, HTN, HLD, CKD, COPD on home O2 who presents SNF following hospitalization for NSTEMI.  Admission to SNF for secondary to weakness and debility.     [facility] a left heart catheterization to evaluate recently increasing episodes of angina requiring more frequent Nitroglycerin use. She has history of iodine allergy and was pretreated with prednisone, but post- procedure she developed SOB, respiratory distress requiring overnight hospitalization for monitoring. She also had elevated blood pressures with systolics greater than 200s during hospitalization. Overnight, as she woke up to urinate she felt 10/10 chest pain all over that she described as "burning", and "veins being ripped apart". STAT EKG at the time showed concerns for  lateral precordial ST depressions. She was transferred to [facility] for further evaluation of her ACS and potential emergent intervention. LHC at outside hospital was notable for significant coraonary artery disease. Patient admitted to CCU while pending evaluation for ACS intervention, given her NSTEMI. Interventional Cardiology and Cardiothoracic Surgery consulted. Initiated guideline directed medical therapy for ACS.   CTS evaluation did not recommend patient for CABG due to her debility as she is prohibitively high risk for CABG. Patient had significant abdominal pain overnight and distended abdomen with mass. In light of this, IC deferred LHC. CT of the abdomen showed a large rectus sheath hematoma. Given " patient's kidney function, CTA was deferred temporarily but the abdominal mass continued to enlarge. CTA of the abdomen showed extravasation of contrast into the rectus sheath hematoma. IR consulted and performed embolization of the inferior epigastric and collateral arteries. Patient's Cr elevated to 1.9 likely due to contrast. Patient's Hgb dropped requiring transfusion with aim >10 for IC intervention. Patient was given 2 pRBC and Hgb responded up to 9.2. IC recommended that since patient had a recent bleed, would defer procedure for outpatient in 1-2 weeks. Patient discharged to SNF and has appointment with Dr. Chappell for possible PCI on 3/17/2022.      Today, patient is without major complaints.  She denies chest pain at this time.  Currently wearing supplemental O2.  Later in the day, patient reported dysuria to nursing.  UA with reflex ordered.     Patient will be treated at [facility] with PT and OT to improve functional status and ability to perform ADLs.     Hospital Course:   Patient progressed well with PT and OT- last PT note states that patient ambulatedpt ambulated 110ft + 95ft with RW and SBA and oxygen in towed. Pt demonstrated mildly unsteady gait with forward flexed posture, narrow DOUG, and decrease esther. Verbal cues for hand placement and keep RW close. 1 seated rest break and no LOB occurred. Patient had no significant events during their stay at SNF. Home health was set up. DME was ordered if needed. Follow up appointment to be made by patient within one week. All prescriptions and discharge instructions were ordered to be given to the patient prior to discharge.     PEx  Constitutional: Patient appears debilitated.  No distress noted  HENT:   Head: Normocephalic and atraumatic.   Eyes: Pupils are equal, round  Neck: Normal range of motion. Neck supple.   Cardiovascular: Normal rate, regular rhythm and normal heart sounds.    Pulmonary/Chest: Effort normal and breath sounds are clear with  diminished bases.  Supplemental oxygen in progress  Abdominal: Soft. Bowel sounds are normal.   Musculoskeletal: Normal range of motion.   Neurological: Alert and oriented to person, place, and time.   Psychiatric: Normal mood and affect. Behavior is normal.   Skin: Skin is warm and dry.  PVD changes to bilateral lower extremities.        Goals of Care Treatment Preferences:  Code Status: Full Code      Consults:   Consults (From admission, onward)        Status Ordering Provider     Inpatient consult to Registered Dietitian/Nutritionist  Once        Provider:  (Not yet assigned)    CONNIE Nails new Assessment & Plan notes have been filed under this hospital service since the last note was generated.  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] 01/16/2019 Yes     Chronic    Primary osteoarthritis of both hips [M16.0] 09/21/2018 Yes    Chronic respiratory failure with hypoxia [J96.11] 04/25/2018 Yes    CKD (chronic kidney disease), stage III [N18.30] 10/08/2014 Yes    Diabetic peripheral neuropathy [E11.42] 06/26/2014 Yes    Coronary artery disease, multivessel with history of previous PCI [I25.10] 06/03/2013 Yes    DM type 2, controlled, with complication [E11.8] 06/03/2013 Yes    Gastroesophageal reflux disease without esophagitis [K21.9] 06/03/2013 Yes    Hypertension [I10] 06/03/2013 Yes    Coronary atherosclerosis [I25.10] 04/09/2013 Yes    Hypercholesterolemia [E78.00] 04/09/2013 Yes      Problems Resolved During this Admission:       Discharged Condition: good    Disposition: Home-Health Care [facility]    Follow Up:   Follow-up Information   "   Khadar Dwyer MD. Schedule an appointment as soon as possible for a visit.    Specialties: Family Medicine, Home Health Services, Hospice Services  Why: WITHIN 1 WEEK  Contact information:  [address]  [address]  [facility] [address]  [phone]                       Patient Instructions:      WALKER FOR HOME USE     Order Specific Question Answer Comments   Type of Walker: Adult (5'4"-6'6")    With wheels? Yes    Height: 5' 3" (1.6 m)    Weight: 79.2 kg (174 lb 9.7 oz)    Length of need (1-99 months): 99    Does patient have medical equipment at home? cane, straight    Does patient have medical equipment at home? rollator    Does patient have medical equipment at home? shower chair    Does patient have medical equipment at home? bedside commode    Please check all that apply: Patient's condition impairs ambulation.    Please check all that apply: Walker will be used for gait training.    Please check all that apply: Patient is unable to safely ambulate without equipment.      No driving until:   Order Comments: Cleared by PCP     Notify your health care provider if you experience any of the following:  temperature >100.4     Notify your health care provider if you experience any of the following:  persistent nausea and vomiting or diarrhea     Notify your health care provider if you experience any of the following:  severe uncontrolled pain     Notify your health care provider if you experience any of the following:  redness, tenderness, or signs of infection (pain, swelling, redness, odor or green/yellow discharge around incision site)     Notify your health care provider if you experience any of the following:  difficulty breathing or increased cough     Notify your health care provider if you experience any of the following:  severe persistent headache     Notify your health care provider if you experience any of the following:  worsening rash     Notify your health care " provider if you experience any of the following:  persistent dizziness, light-headedness, or visual disturbances     Notify your health care provider if you experience any of the following:  increased confusion or weakness     Activity as tolerated       Significant Diagnostic Studies: Labs:   BMP:   Recent Labs   Lab 03/31/22  0503   GLU 91      K 3.8      CO2 29   BUN 68*   CREATININE 1.5*   CALCIUM 9.0   MG 1.8    and CBC   Recent Labs   Lab 03/31/22  0503   WBC 6.43   HGB 9.3*   HCT 29.8*   *       Pending Diagnostic Studies:     None         Medications:  Reconciled Home Medications:      Medication List      START taking these medications    cholestyramine 4 gram packet  Commonly known as: QUESTRAN  Take 1 packet (4 g total) by mouth 2 (two) times daily.     ferrous sulfate 325 (65 FE) MG EC tablet  Take 1 tablet (325 mg total) by mouth once daily.     gabapentin 100 MG capsule  Commonly known as: NEURONTIN  Take 1 capsule (100 mg total) by mouth every evening.     isosorbide mononitrate 30 MG 24 hr tablet  Commonly known as: IMDUR  Take 1 tablet (30 mg total) by mouth once daily.        CHANGE how you take these medications    INCRUSE ELLIPTA 62.5 mcg/actuation inhalation capsule  Generic drug: umeclidinium  Inhale 62.5 mcg into the lungs every morning. Controller  What changed: how much to take     lisinopriL 10 MG tablet  Take 1 tablet (10 mg total) by mouth once daily. HOLD UNTIL OTHERWISE DIRECTED BY PRIMARY CARE PROVIDER  What changed: additional instructions        CONTINUE taking these medications    acetaminophen 325 MG tablet  Commonly known as: TYLENOL  Take 2 tablets (650 mg total) by mouth every 4 (four) hours as needed.     albuterol 90 mcg/actuation inhaler  Commonly known as: VENTOLIN HFA  Inhale 2 puffs into the lungs every 6 (six) hours as needed for Wheezing or Shortness of Breath. Rescue     amLODIPine 10 MG tablet  Commonly known as: NORVASC  Take 1 tablet (10 mg total)  by mouth once daily.     aspirin 81 MG EC tablet  Commonly known as: ECOTRIN  Take 81 mg by mouth every morning.     coenzyme Q10 100 mg capsule  Take 100 mg by mouth every morning.     CombiVENT RESPIMAT  mcg/actuation inhaler  Generic drug: ipratropium-albuteroL  Inhale 2 puffs into the lungs every 4 (four) hours as needed for Shortness of Breath. Rescue     ergocalciferol 50,000 unit Cap  Commonly known as: VITAMIN D2  Take 1 capsule (50,000 Units total) by mouth every 7 days.     fish oil-omega-3 fatty acids 300-1,000 mg capsule  Take 1 capsule by mouth every morning.     fluticasone-salmeterol 250-50 mcg/dose 250-50 mcg/dose diskus inhaler  Commonly known as: ADVAIR DISKUS  Inhale 1 puff into the lungs 2 (two) times daily.     furosemide 20 MG tablet  Commonly known as: LASIX  Take 2 tablets (40 mg total) by mouth once daily.     lovastatin 40 MG tablet  Commonly known as: MEVACOR  Take 1 tablet (40 mg total) by mouth every other day.     metoprolol succinate 50 MG 24 hr tablet  Commonly known as: TOPROL-XL  Take 1 tablet (50 mg total) by mouth once daily.     NARCAN 4 mg/actuation Spry  Generic drug: naloxone     * nitroGLYCERIN 0.2 mg/hr TD PT24 0.2 mg/hr  Commonly known as: NITRODUR  APPLY 1 PATCH TOPICALLY ONCE DAILY TO LEG.     * nitroGLYCERIN 0.4 MG/DOSE TL SPRY 400 mcg/spray spray  Commonly known as: NITROLINGUAL  USE ONE SPRAY UNDER THE TONGUE EVERY 5 MINUTES AS NEEDED FOR CHEST PAIN.  DO NOT EXCEED A TOTAL OF 3 SPRAYS IN 15 MINUTES     ondansetron 4 MG Tbdl  Commonly known as: ZOFRAN-ODT  Take 2 tablets (8 mg total) by mouth every 6 (six) hours as needed.     pantoprazole 40 MG tablet  Commonly known as: PROTONIX  Take 1 tablet (40 mg total) by mouth once daily.     ticagrelor 90 mg tablet  Commonly known as: BRILINTA  Take 1 tablet (90 mg total) by mouth 2 (two) times a day.     triamcinolone acetonide 0.1% 0.1 % cream  Commonly known as: KENALOG  Apply topically 2 (two) times daily. For legs.          * This list has 2 medication(s) that are the same as other medications prescribed for you. Read the directions carefully, and ask your doctor or other care provider to review them with you.            STOP taking these medications    atorvastatin 80 MG tablet  Commonly known as: LIPITOR     benazepriL 40 MG tablet  Commonly known as: LOTENSIN     bromfenac 0.07 % Drop  Commonly known as: PROLENSA     dextran 70-hypromellose 0.1-0.3 % Drop     FLAXSEED OIL ORAL     PRENATAL #115-IRON-FOLIC ACID ORAL     PRESERVISION AREDS-2 ORAL     temazepam 15 mg Cap  Commonly known as: RESTORIL            Indwelling Lines/Drains at time of discharge:   Lines/Drains/Airways     Drain  Duration           Female External Urinary Catheter 03/27/22 2000 4 days                Time spent on the discharge of patient: 38 minutes         Natalie Clemons NP  Department of Hospital Medicine  [facility]

## 2022-04-01 NOTE — PLAN OF CARE
Problem: Adult Inpatient Plan of Care  Goal: Plan of Care Review  Outcome: Ongoing, Progressing  Goal: Patient-Specific Goal (Individualized)  Outcome: Ongoing, Progressing  Goal: Absence of Hospital-Acquired Illness or Injury  Outcome: Ongoing, Progressing  Goal: Readiness for Transition of Care  Outcome: Ongoing, Progressing     Problem: Diabetes Comorbidity  Goal: Blood Glucose Level Within Targeted Range  Outcome: Ongoing, Progressing     Problem: Impaired Wound Healing  Goal: Optimal Wound Healing  Outcome: Ongoing, Progressing     Problem: Skin Injury Risk Increased  Goal: Skin Health and Integrity  Outcome: Ongoing, Progressing     Problem: Fall Injury Risk  Goal: Absence of Fall and Fall-Related Injury  Outcome: Ongoing, Progressing

## 2022-04-01 NOTE — PLAN OF CARE
Problem: Adult Inpatient Plan of Care  Goal: Plan of Care Review  Outcome: Met  Goal: Patient-Specific Goal (Individualized)  Outcome: Met  Goal: Absence of Hospital-Acquired Illness or Injury  Outcome: Met  Goal: Optimal Comfort and Wellbeing  Outcome: Met  Goal: Readiness for Transition of Care  Outcome: Met     Problem: Diabetes Comorbidity  Goal: Blood Glucose Level Within Targeted Range  Outcome: Met     Problem: Impaired Wound Healing  Goal: Optimal Wound Healing  Outcome: Met     Problem: Skin Injury Risk Increased  Goal: Skin Health and Integrity  Outcome: Met     Problem: Fall Injury Risk  Goal: Absence of Fall and Fall-Related Injury  Outcome: Met

## 2022-04-01 NOTE — HOSPITAL COURSE
Patient progressed well with PT and OT- last PT note states that patient ambulated***. Patient had no significant events during their stay at SNF. Home health was set up. DME was ordered if needed. Follow up appointment to be made by patient within one week. All prescriptions and discharge instructions were ordered to be given to the patient prior to discharge.     PEx  Constitutional: Patient appears debilitated.  No distress noted  HENT:   Head: Normocephalic and atraumatic.   Eyes: Pupils are equal, round  Neck: Normal range of motion. Neck supple.   Cardiovascular: Normal rate, regular rhythm and normal heart sounds.    Pulmonary/Chest: Effort normal and breath sounds are clear with diminished bases.  Supplemental oxygen in progress  Abdominal: Soft. Bowel sounds are normal.   Musculoskeletal: Normal range of motion.   Neurological: Alert and oriented to person, place, and time.   Psychiatric: Normal mood and affect. Behavior is normal.   Skin: Skin is warm and dry.  PVD changes to bilateral lower extremities.

## 2022-04-04 ENCOUNTER — TELEPHONE (OUTPATIENT)
Dept: FAMILY MEDICINE | Facility: CLINIC | Age: 77
End: 2022-04-04
Payer: MEDICARE

## 2022-04-04 ENCOUNTER — PATIENT OUTREACH (OUTPATIENT)
Dept: ADMINISTRATIVE | Facility: CLINIC | Age: 77
End: 2022-04-04
Payer: MEDICARE

## 2022-04-04 ENCOUNTER — PATIENT OUTREACH (OUTPATIENT)
Dept: FAMILY MEDICINE | Facility: CLINIC | Age: 77
End: 2022-04-04
Payer: MEDICARE

## 2022-04-04 NOTE — PROGRESS NOTES
C3 nurse attempted to contact Marisol Kerr for a TCC post acute discharge follow up call. No answer. Left voicemail with callback information. The patient does not have a scheduled HOSFU appointment.

## 2022-04-04 NOTE — PROGRESS NOTES
C3 nurse attempted to contact Marisol Kerr for a TCC post acute discharge follow up call. No answer. Left voicemail with callback information. The patient does not have a scheduled HOSFU appointment. Message sent to PCP staff for assistance with scheduling visit with patient.

## 2022-04-04 NOTE — PROGRESS NOTES
Discharge Information     Discharge Date:   4/1/2022    Primary Discharge Diagnosis:  Chest pains     Discharge Summary:  Reviewed      Medication & Order Review     Were medication changes made or new medications added?   Yes    If so, has the patient filled the prescriptions?  Yes     Was Home Health ordered? Yes    If so, has Home Health contacted patient and/or initiated services?  Yes    Name of Home Health Agency? [facility]     Durable Medical Equipment ordered?  No     If so, has the DME provider contacted patient and delivered equipment?  N/A    Follow Up               Any problems since discharge? No    How is the patient feeling since returning home?  Pt is feeling better     Have you set up recommended follow up appointments?  (cardiology, surgery, etc.)    Schedule Hospital Follow-up appointment within 7-14 days (preferably 7).  All appts scheduled. Patient scheduled to come into office on 4/12/22 ( 7 days )    Notes:           Christa Contreras

## 2022-04-05 ENCOUNTER — OUTPATIENT CASE MANAGEMENT (OUTPATIENT)
Dept: ADMINISTRATIVE | Facility: OTHER | Age: 77
End: 2022-04-05
Payer: MEDICARE

## 2022-04-06 NOTE — PROGRESS NOTES
C3 nurse attempted to contact patient for a TCC post hospital discharge follow-up call. The patient's daughter declined call at this time.

## 2022-04-07 ENCOUNTER — NURSE TRIAGE (OUTPATIENT)
Dept: ADMINISTRATIVE | Facility: CLINIC | Age: 77
End: 2022-04-07
Payer: MEDICARE

## 2022-04-07 ENCOUNTER — HOSPITAL ENCOUNTER (OUTPATIENT)
Facility: HOSPITAL | Age: 77
Discharge: ANOTHER HOSPITAL | End: 2022-04-08
Attending: EMERGENCY MEDICINE | Admitting: INTERNAL MEDICINE
Payer: MEDICARE

## 2022-04-07 ENCOUNTER — TELEPHONE (OUTPATIENT)
Dept: CARDIOLOGY | Facility: CLINIC | Age: 77
End: 2022-04-07
Payer: MEDICARE

## 2022-04-07 DIAGNOSIS — R07.9 CHEST PAIN, UNSPECIFIED TYPE: Primary | ICD-10-CM

## 2022-04-07 DIAGNOSIS — R07.9 CHEST PAIN: ICD-10-CM

## 2022-04-07 LAB
ALBUMIN SERPL BCP-MCNC: 3.3 G/DL (ref 3.5–5.2)
ALP SERPL-CCNC: 73 U/L (ref 55–135)
ALT SERPL W/O P-5'-P-CCNC: 16 U/L (ref 10–44)
ANION GAP SERPL CALC-SCNC: 7 MMOL/L (ref 8–16)
AST SERPL-CCNC: 17 U/L (ref 10–40)
BASOPHILS # BLD AUTO: 0.03 K/UL (ref 0–0.2)
BASOPHILS NFR BLD: 0.4 % (ref 0–1.9)
BILIRUB SERPL-MCNC: 0.7 MG/DL (ref 0.1–1)
BNP SERPL-MCNC: 219 PG/ML (ref 0–99)
BUN SERPL-MCNC: 33 MG/DL (ref 8–23)
CALCIUM SERPL-MCNC: 8.9 MG/DL (ref 8.7–10.5)
CHLORIDE SERPL-SCNC: 106 MMOL/L (ref 95–110)
CO2 SERPL-SCNC: 27 MMOL/L (ref 23–29)
CREAT SERPL-MCNC: 1.3 MG/DL (ref 0.5–1.4)
DIFFERENTIAL METHOD: ABNORMAL
EOSINOPHIL # BLD AUTO: 0.1 K/UL (ref 0–0.5)
EOSINOPHIL NFR BLD: 1.2 % (ref 0–8)
ERYTHROCYTE [DISTWIDTH] IN BLOOD BY AUTOMATED COUNT: 14 % (ref 11.5–14.5)
EST. GFR  (AFRICAN AMERICAN): 46.7 ML/MIN/1.73 M^2
EST. GFR  (NON AFRICAN AMERICAN): 40.5 ML/MIN/1.73 M^2
GLUCOSE SERPL-MCNC: 121 MG/DL (ref 70–110)
HCT VFR BLD AUTO: 31.4 % (ref 37–48.5)
HGB BLD-MCNC: 9.7 G/DL (ref 12–16)
IMM GRANULOCYTES # BLD AUTO: 0.03 K/UL (ref 0–0.04)
IMM GRANULOCYTES NFR BLD AUTO: 0.4 % (ref 0–0.5)
INR PPP: 1.1
LYMPHOCYTES # BLD AUTO: 0.8 K/UL (ref 1–4.8)
LYMPHOCYTES NFR BLD: 9.4 % (ref 18–48)
MCH RBC QN AUTO: 28.8 PG (ref 27–31)
MCHC RBC AUTO-ENTMCNC: 30.9 G/DL (ref 32–36)
MCV RBC AUTO: 93 FL (ref 82–98)
MONOCYTES # BLD AUTO: 0.5 K/UL (ref 0.3–1)
MONOCYTES NFR BLD: 5.8 % (ref 4–15)
NEUTROPHILS # BLD AUTO: 6.9 K/UL (ref 1.8–7.7)
NEUTROPHILS NFR BLD: 82.8 % (ref 38–73)
NRBC BLD-RTO: 0 /100 WBC
PLATELET # BLD AUTO: 160 K/UL (ref 150–450)
PMV BLD AUTO: 11.3 FL (ref 9.2–12.9)
POTASSIUM SERPL-SCNC: 4.9 MMOL/L (ref 3.5–5.1)
PROT SERPL-MCNC: 6.3 G/DL (ref 6–8.4)
PROTHROMBIN TIME: 13.3 SEC (ref 11.4–13.7)
RBC # BLD AUTO: 3.37 M/UL (ref 4–5.4)
SARS-COV-2 RDRP RESP QL NAA+PROBE: NEGATIVE
SODIUM SERPL-SCNC: 140 MMOL/L (ref 136–145)
TROPONIN I SERPL DL<=0.01 NG/ML-MCNC: 0.06 NG/ML
TROPONIN I SERPL DL<=0.01 NG/ML-MCNC: 0.07 NG/ML
WBC # BLD AUTO: 8.33 K/UL (ref 3.9–12.7)

## 2022-04-07 PROCEDURE — 36415 COLL VENOUS BLD VENIPUNCTURE: CPT | Performed by: INTERNAL MEDICINE

## 2022-04-07 PROCEDURE — 93010 ELECTROCARDIOGRAM REPORT: CPT | Mod: ,,, | Performed by: GENERAL PRACTICE

## 2022-04-07 PROCEDURE — 85610 PROTHROMBIN TIME: CPT | Performed by: STUDENT IN AN ORGANIZED HEALTH CARE EDUCATION/TRAINING PROGRAM

## 2022-04-07 PROCEDURE — U0002 COVID-19 LAB TEST NON-CDC: HCPCS | Performed by: INTERNAL MEDICINE

## 2022-04-07 PROCEDURE — 80053 COMPREHEN METABOLIC PANEL: CPT | Performed by: STUDENT IN AN ORGANIZED HEALTH CARE EDUCATION/TRAINING PROGRAM

## 2022-04-07 PROCEDURE — 93005 ELECTROCARDIOGRAM TRACING: CPT | Performed by: GENERAL PRACTICE

## 2022-04-07 PROCEDURE — 99285 EMERGENCY DEPT VISIT HI MDM: CPT | Mod: 25,CS

## 2022-04-07 PROCEDURE — 99900035 HC TECH TIME PER 15 MIN (STAT)

## 2022-04-07 PROCEDURE — 27000221 HC OXYGEN, UP TO 24 HOURS

## 2022-04-07 PROCEDURE — 85025 COMPLETE CBC W/AUTO DIFF WBC: CPT | Performed by: STUDENT IN AN ORGANIZED HEALTH CARE EDUCATION/TRAINING PROGRAM

## 2022-04-07 PROCEDURE — 25000003 PHARM REV CODE 250: Performed by: INTERNAL MEDICINE

## 2022-04-07 PROCEDURE — G0378 HOSPITAL OBSERVATION PER HR: HCPCS

## 2022-04-07 PROCEDURE — A4216 STERILE WATER/SALINE, 10 ML: HCPCS | Performed by: INTERNAL MEDICINE

## 2022-04-07 PROCEDURE — 84484 ASSAY OF TROPONIN QUANT: CPT | Mod: 91 | Performed by: INTERNAL MEDICINE

## 2022-04-07 PROCEDURE — G0378 HOSPITAL OBSERVATION PER HR: HCPCS | Mod: CS

## 2022-04-07 PROCEDURE — 94799 UNLISTED PULMONARY SVC/PX: CPT

## 2022-04-07 PROCEDURE — 93010 EKG 12-LEAD: ICD-10-PCS | Mod: ,,, | Performed by: GENERAL PRACTICE

## 2022-04-07 PROCEDURE — 84484 ASSAY OF TROPONIN QUANT: CPT | Performed by: STUDENT IN AN ORGANIZED HEALTH CARE EDUCATION/TRAINING PROGRAM

## 2022-04-07 PROCEDURE — 94760 N-INVAS EAR/PLS OXIMETRY 1: CPT

## 2022-04-07 PROCEDURE — 83880 ASSAY OF NATRIURETIC PEPTIDE: CPT | Performed by: STUDENT IN AN ORGANIZED HEALTH CARE EDUCATION/TRAINING PROGRAM

## 2022-04-07 RX ORDER — ALBUTEROL SULFATE 90 UG/1
2 AEROSOL, METERED RESPIRATORY (INHALATION) EVERY 6 HOURS PRN
Status: DISCONTINUED | OUTPATIENT
Start: 2022-04-07 | End: 2022-04-08 | Stop reason: HOSPADM

## 2022-04-07 RX ORDER — HYDRALAZINE HYDROCHLORIDE 20 MG/ML
5 INJECTION INTRAMUSCULAR; INTRAVENOUS EVERY 8 HOURS PRN
Status: DISCONTINUED | OUTPATIENT
Start: 2022-04-07 | End: 2022-04-08 | Stop reason: HOSPADM

## 2022-04-07 RX ORDER — IPRATROPIUM BROMIDE AND ALBUTEROL SULFATE 2.5; .5 MG/3ML; MG/3ML
3 SOLUTION RESPIRATORY (INHALATION) EVERY 6 HOURS PRN
Status: DISCONTINUED | OUTPATIENT
Start: 2022-04-07 | End: 2022-04-08 | Stop reason: HOSPADM

## 2022-04-07 RX ORDER — ATORVASTATIN CALCIUM 40 MG/1
80 TABLET, FILM COATED ORAL DAILY
Status: DISCONTINUED | OUTPATIENT
Start: 2022-04-07 | End: 2022-04-08 | Stop reason: HOSPADM

## 2022-04-07 RX ORDER — LISINOPRIL 10 MG/1
10 TABLET ORAL DAILY
Status: DISCONTINUED | OUTPATIENT
Start: 2022-04-08 | End: 2022-04-08 | Stop reason: HOSPADM

## 2022-04-07 RX ORDER — TEMAZEPAM 15 MG/1
15 CAPSULE ORAL NIGHTLY PRN
COMMUNITY
End: 2022-06-29

## 2022-04-07 RX ORDER — METOPROLOL SUCCINATE 50 MG/1
50 TABLET, EXTENDED RELEASE ORAL DAILY
Status: DISCONTINUED | OUTPATIENT
Start: 2022-04-08 | End: 2022-04-08 | Stop reason: HOSPADM

## 2022-04-07 RX ORDER — FLUTICASONE FUROATE AND VILANTEROL 100; 25 UG/1; UG/1
1 POWDER RESPIRATORY (INHALATION) DAILY
Status: DISCONTINUED | OUTPATIENT
Start: 2022-04-08 | End: 2022-04-08 | Stop reason: HOSPADM

## 2022-04-07 RX ORDER — RANOLAZINE 500 MG/1
500 TABLET, EXTENDED RELEASE ORAL 2 TIMES DAILY
Status: DISCONTINUED | OUTPATIENT
Start: 2022-04-07 | End: 2022-04-08 | Stop reason: HOSPADM

## 2022-04-07 RX ORDER — SODIUM CHLORIDE 0.9 % (FLUSH) 0.9 %
10 SYRINGE (ML) INJECTION EVERY 12 HOURS
Status: DISCONTINUED | OUTPATIENT
Start: 2022-04-07 | End: 2022-04-08 | Stop reason: HOSPADM

## 2022-04-07 RX ORDER — ASPIRIN 81 MG/1
81 TABLET ORAL EVERY MORNING
Status: DISCONTINUED | OUTPATIENT
Start: 2022-04-08 | End: 2022-04-08 | Stop reason: HOSPADM

## 2022-04-07 RX ORDER — ISOSORBIDE MONONITRATE 60 MG/1
60 TABLET, EXTENDED RELEASE ORAL DAILY
Status: DISCONTINUED | OUTPATIENT
Start: 2022-04-08 | End: 2022-04-08 | Stop reason: HOSPADM

## 2022-04-07 RX ORDER — GABAPENTIN 100 MG/1
100 CAPSULE ORAL NIGHTLY
Status: DISCONTINUED | OUTPATIENT
Start: 2022-04-07 | End: 2022-04-08 | Stop reason: HOSPADM

## 2022-04-07 RX ORDER — ATORVASTATIN CALCIUM 80 MG/1
80 TABLET, FILM COATED ORAL DAILY
COMMUNITY
End: 2022-05-02 | Stop reason: SDUPTHER

## 2022-04-07 RX ORDER — FUROSEMIDE 40 MG/1
40 TABLET ORAL DAILY
Status: DISCONTINUED | OUTPATIENT
Start: 2022-04-08 | End: 2022-04-08 | Stop reason: HOSPADM

## 2022-04-07 RX ORDER — AMLODIPINE BESYLATE 5 MG/1
10 TABLET ORAL DAILY
Status: DISCONTINUED | OUTPATIENT
Start: 2022-04-08 | End: 2022-04-08 | Stop reason: HOSPADM

## 2022-04-07 RX ORDER — CHOLESTYRAMINE 4 G/4.8G
4 POWDER, FOR SUSPENSION ORAL 2 TIMES DAILY
Status: DISCONTINUED | OUTPATIENT
Start: 2022-04-07 | End: 2022-04-08 | Stop reason: HOSPADM

## 2022-04-07 RX ADMIN — SODIUM CHLORIDE, PRESERVATIVE FREE 10 ML: 5 INJECTION INTRAVENOUS at 01:04

## 2022-04-07 RX ADMIN — TICAGRELOR 90 MG: 90 TABLET ORAL at 09:04

## 2022-04-07 RX ADMIN — ATORVASTATIN CALCIUM 80 MG: 40 TABLET, FILM COATED ORAL at 09:04

## 2022-04-07 RX ADMIN — CHOLESTYRAMINE 4 G: 4 POWDER, FOR SUSPENSION ORAL at 09:04

## 2022-04-07 RX ADMIN — GABAPENTIN 100 MG: 100 CAPSULE ORAL at 09:04

## 2022-04-07 RX ADMIN — RANOLAZINE 500 MG: 500 TABLET, EXTENDED RELEASE ORAL at 09:04

## 2022-04-07 RX ADMIN — SODIUM CHLORIDE, PRESERVATIVE FREE 10 ML: 5 INJECTION INTRAVENOUS at 09:04

## 2022-04-07 NOTE — ED PROVIDER NOTES
Encounter Date: 4/7/2022       History     Chief Complaint   Patient presents with    Chest Pain     Midsternal chest pain, pt took 3 doses of nitro at home with relief.      76-year-old female with a past medical history of coronary artery disease CHF COPD diabetes presents today complaining of chest pain patient has a history of cardiac catheterization a couple of weeks ago which showed coronary disease patient was determined not to be a candidate for CABG patient also had this procedure complicated by a rectus sheath hematoma patient presenting with substernal chest pain starting this morning patient reports she took 3 nitroglycerin pills and this alleviated her chest pain.        Review of patient's allergies indicates:   Allergen Reactions    Iodinated contrast media     Strawberries [strawberry] Hives and Itching     Past Medical History:   Diagnosis Date    CAD (coronary artery disease)     Cancer     colon    CHF (congestive heart failure)     Colitis     Colon cancer     COPD (chronic obstructive pulmonary disease)     Decreased hearing     Diabetes mellitus     Diabetes mellitus, type 2     Hypercholesterolemia     Hypertension     Hypoxemia     Insomnia     Obesity     Osteoarthritis      Past Surgical History:   Procedure Laterality Date    ANGIOGRAM, CORONARY, WITH LEFT HEART CATHETERIZATION N/A 2/10/2022    Procedure: Angiogram, Coronary, with Left Heart Cath;  Surgeon: Cristian Benjamin MD;  Location: [facility] CATH/EP LAB;  Service: Cardiology;  Laterality: N/A;    CARDIAC CATHETERIZATION      CHOLECYSTECTOMY      COLECTOMY      CORONARY ANGIOPLASTY      CORONARY STENT PLACEMENT      EXTERNAL EAR SURGERY      EYE SURGERY      FLEXIBLE SIGMOIDOSCOPY N/A 9/19/2019    Procedure: SIGMOIDOSCOPY, FLEXIBLE;  Surgeon: Biju Kramer III, MD;  Location: [facility] ENDO;  Service: Endoscopy;  Laterality: N/A;    HYSTERECTOMY      partial     Family History   Problem Relation Age of  Onset    Febrile seizures Mother     Heart failure Father      Social History     Tobacco Use    Smoking status: Former Smoker     Packs/day: 3.00     Years: 50.00     Pack years: 150.00     Types: Cigarettes     Start date: 3/30/1964     Quit date: 2006     Years since quittin.1    Smokeless tobacco: Never Used    Tobacco comment: ex smoker 15 years   Substance Use Topics    Alcohol use: Yes    Drug use: No     Review of Systems   Constitutional: Negative for activity change, appetite change, chills, diaphoresis, fatigue and fever.   HENT: Negative for congestion, rhinorrhea, sore throat, trouble swallowing and voice change.    Eyes: Negative for visual disturbance.   Respiratory: Negative for cough, choking, chest tightness, shortness of breath, wheezing and stridor.    Cardiovascular: Positive for chest pain. Negative for palpitations and leg swelling.   Gastrointestinal: Negative for abdominal distention, abdominal pain, blood in stool, constipation, diarrhea, nausea and vomiting.   Genitourinary: Negative for difficulty urinating, dysuria, flank pain, frequency and hematuria.   Musculoskeletal: Negative for arthralgias, back pain, joint swelling, myalgias, neck pain and neck stiffness.   Skin: Negative for color change and rash.   Neurological: Negative for dizziness, syncope, speech difficulty, weakness, numbness and headaches.   Hematological: Negative for adenopathy. Does not bruise/bleed easily.   All other systems reviewed and are negative.      Physical Exam     Initial Vitals   BP Pulse Resp Temp SpO2   22 1203 22 1203 22 1203 22 1700 22 1203   (!) 140/63 98 17 98 °F (36.7 °C) 100 %      MAP       --                Physical Exam    Nursing note and vitals reviewed.  Constitutional: She appears well-developed and well-nourished. She is not diaphoretic. No distress.   HENT:   Head: Normocephalic and atraumatic.   Right Ear: External ear normal.   Left Ear:  External ear normal.   Nose: Nose normal.   Mouth/Throat: Oropharynx is clear and moist. No oropharyngeal exudate.   Eyes: Conjunctivae and EOM are normal. Pupils are equal, round, and reactive to light. Right eye exhibits no discharge. Left eye exhibits no discharge. No scleral icterus.   Neck: Neck supple. No thyromegaly present. No tracheal deviation present. No JVD present.   Normal range of motion.  Cardiovascular: Normal rate, regular rhythm, normal heart sounds and intact distal pulses. Exam reveals no gallop and no friction rub.    No murmur heard.  Pulmonary/Chest: Breath sounds normal. No stridor. No respiratory distress. She has no wheezes. She has no rhonchi. She has no rales. She exhibits no tenderness.   Abdominal: Abdomen is soft. Bowel sounds are normal. She exhibits no distension and no mass. There is no abdominal tenderness. There is no rebound and no guarding.   Musculoskeletal:         General: No tenderness or edema. Normal range of motion.      Cervical back: Normal range of motion and neck supple.     Lymphadenopathy:     She has no cervical adenopathy.   Neurological: She is alert and oriented to person, place, and time. She has normal strength. No cranial nerve deficit or sensory deficit.   Skin: Skin is warm and dry. No rash and no abscess noted. No erythema. No pallor.         ED Course   Procedures  Labs Reviewed   CBC W/ AUTO DIFFERENTIAL - Abnormal; Notable for the following components:       Result Value    RBC 3.37 (*)     Hemoglobin 9.7 (*)     Hematocrit 31.4 (*)     MCHC 30.9 (*)     Lymph # 0.8 (*)     Gran % 82.8 (*)     Lymph % 9.4 (*)     All other components within normal limits   COMPREHENSIVE METABOLIC PANEL - Abnormal; Notable for the following components:    Glucose 121 (*)     BUN 33 (*)     Albumin 3.3 (*)     Anion Gap 7 (*)     eGFR if  46.7 (*)     eGFR if non  40.5 (*)     All other components within normal limits   B-TYPE NATRIURETIC  PEPTIDE - Abnormal; Notable for the following components:     (*)     All other components within normal limits   TROPONIN I - Abnormal; Notable for the following components:    Troponin I 0.058 (*)     All other components within normal limits   PROTIME-INR   SARS-COV-2 RNA AMPLIFICATION, QUAL        ECG Results          EKG 12-lead (In process)  Result time 04/07/22 12:14:10    In process by Interface, Lab In [facility] (04/07/22 12:14:10)                 Narrative:    Test Reason : R07.9,    Vent. Rate : 076 BPM     Atrial Rate : 076 BPM     P-R Int : 168 ms          QRS Dur : 098 ms      QT Int : 360 ms       P-R-T Axes : 066 061 029 degrees     QTc Int : 405 ms    Sinus rhythm with marked sinus arrythmia  Otherwise normal ECG  When compared with ECG of 16-MAR-2022 00:02,  Vent. rate has increased BY  25 BPM    Referred By: [email or web address]   SELF           Confirmed By:                             Imaging Results          X-Ray Chest AP Portable (Final result)  Result time 04/07/22 12:44:00    Final result by García Russell MD (04/07/22 12:44:00)                 Narrative:    CLINICAL HISTORY:  76 years (1945) Female Chest pain Chest Pain (Midsternal chest pain,; Hx of COPD, EMPHYSEMA    TECHNIQUE:  Portable AP radiograph the chest.    COMPARISON:  Most recent radiograph from February 10, 2022    FINDINGS:  The lungs are clear. Costophrenic angles are seen without effusion. No pneumothorax is identified. The heart is top normal in size.  Osseous structures show degenerative disc disease and degenerative changes in the shoulders. The visualized upper abdomen is unremarkable.    IMPRESSION:  No acute cardiac or pulmonary process.                  .            Electronically signed by:  García Russell MD  4/7/2022 12:44 PM CDT Workstation: [id number]                               Medications   sodium chloride 0.9% flush 10 mL (has no administration in time range)   albuterol inhaler 2 puff (has no  administration in time range)   amLODIPine tablet 10 mg (has no administration in time range)   aspirin EC tablet 81 mg (has no administration in time range)   atorvastatin tablet 80 mg (has no administration in time range)   cholestyramine-aspartame 4 gram packet 4 g (has no administration in time range)   fluticasone furoate-vilanteroL 100-25 mcg/dose diskus inhaler 1 puff (has no administration in time range)   furosemide tablet 40 mg (has no administration in time range)   gabapentin capsule 100 mg (has no administration in time range)   albuterol-ipratropium 2.5 mg-0.5 mg/3 mL nebulizer solution 3 mL (has no administration in time range)   isosorbide mononitrate 24 hr tablet 60 mg (has no administration in time range)   lisinopriL tablet 10 mg (has no administration in time range)   metoprolol succinate (TOPROL-XL) 24 hr tablet 50 mg (has no administration in time range)   ticagrelor tablet 90 mg (has no administration in time range)   umeclidinium 62.5 mcg/actuation inhalation capsule 62.5 mcg (has no administration in time range)   ranolazine 12 hr tablet 500 mg (has no administration in time range)   hydrALAZINE injection 5 mg (has no administration in time range)     Medical Decision Making:   History:   Old Medical Records: I decided to obtain old medical records.  Initial Assessment:   Emergent evaluation of a 76-year-old female presenting with chest pain differential diagnosis includes ACS, electrolyte abnormality, endocrine dysfunction, infection            Attending Attestation:             Attending ED Notes:   Patient consulted Internal Medicine with Cardiology to follow               Clinical Impression:   Final diagnoses:  [R07.9] Chest pain, unspecified type (Primary)          ED Disposition Condition    Observation               Duke Friedman MD  04/07/22 [id number]

## 2022-04-07 NOTE — TELEPHONE ENCOUNTER
Patient daughter called stating that she was on her way to take her mom(patient) to the ED in [place]. States patient has had chest pains lasting over 5 minutes intermittently over the night. Patient appointment canceled. Dr. Chappell notified.

## 2022-04-07 NOTE — HPI
76 year old female with PMHx of DM2, HTN, HLD, CKD, COPD on home O2  recent STEMI s/p LHC who is not amenable for CABG came with CP .  She had left heart catheterization  here and had complication with rectus sheet hematoma/bleeding and transferred to [facility] [place] intervention and embolization was done recently and admitted to rehab there and got discharged recently . .   chest pain started this morning and substernal and currently resolved .  Mild troponin elevation and admitted . Actually patient has appointment today with [facility] [place] [place] possible  interventionist but admitted here .  On exam , no CP , no leg pain or SOB . Deny abdominal pain and Hb is stable  .  Bilateral significant leg edema noted but patient said this is even better than before.

## 2022-04-07 NOTE — NURSING
Pt received to room 2521. Bed alarm set, call light in reach. VSS. Pt has no complaints at this time. Left pt safe and comfortable. Will continue to monitor.

## 2022-04-07 NOTE — H&P
[facility] - Emergency Dept  Hospital Medicine  History & Physical    Patient Name: Marisol Kerr  MRN: [id number]  Patient Class: OP- Observation  Admission Date: 4/7/2022  Attending Physician: Elio Grant MD   Primary Care Provider: Khadar Dwyer MD         Patient information was obtained from patient and ER records.     Subjective:     Principal Problem:<principal problem not specified>    Chief Complaint:   Chief Complaint   Patient presents with    Chest Pain     Midsternal chest pain, pt took 3 doses of nitro at home with relief.         HPI:     76 year old female with PMHx of DM2, HTN, HLD, CKD, COPD on home O2  recent STEMI and was transferred to [facility] for high risk PCI . She had done  s/p LHC here  and she is deem  not amenable for CABG, came with CP .   [place] [facility] [place]  rectus sheet hematoma/bleeding and  IR intervention and embolization was done recently and admitted to rehab there and got discharged recently . .   chest pain started this morning and substernal and currently resolved .  Mild troponin elevation and admitted . Actually patient [place] [facility] [place] but admitted here .  On exam , no CP , no leg pain or SOB . Deny abdominal pain and Hb is stable  .  Bilateral significant leg edema noted but patient said this is even better than before.      Past Medical History:   Diagnosis Date    CAD (coronary artery disease)     Cancer     colon    CHF (congestive heart failure)     Colitis     Colon cancer     COPD (chronic obstructive pulmonary disease)     Decreased hearing     Diabetes mellitus     Diabetes mellitus, type 2     Hypercholesterolemia     Hypertension     Hypoxemia     Insomnia     Obesity     Osteoarthritis        Past Surgical History:   Procedure Laterality Date    ANGIOGRAM, CORONARY, WITH LEFT HEART CATHETERIZATION N/A 2/10/2022    Procedure: Angiogram, Coronary, with Left Heart Cath;  Surgeon: Cristian JAMES  MD Cassidy;  Location: [facility] CATH/EP LAB;  Service: Cardiology;  Laterality: N/A;    CARDIAC CATHETERIZATION      CHOLECYSTECTOMY      COLECTOMY      CORONARY ANGIOPLASTY      CORONARY STENT PLACEMENT      EXTERNAL EAR SURGERY      EYE SURGERY      FLEXIBLE SIGMOIDOSCOPY N/A 9/19/2019    Procedure: SIGMOIDOSCOPY, FLEXIBLE;  Surgeon: Biju Kramer III, MD;  Location: [facility] ENDO;  Service: Endoscopy;  Laterality: N/A;    HYSTERECTOMY      partial       Review of patient's allergies indicates:   Allergen Reactions    Iodinated contrast media     Strawberries [strawberry] Hives and Itching       No current facility-administered medications on file prior to encounter.     Current Outpatient Medications on File Prior to Encounter   Medication Sig    acetaminophen (TYLENOL) 325 MG tablet Take 2 tablets (650 mg total) by mouth every 4 (four) hours as needed.    albuterol (VENTOLIN HFA) 90 mcg/actuation inhaler Inhale 2 puffs into the lungs every 6 (six) hours as needed for Wheezing or Shortness of Breath. Rescue    amLODIPine (NORVASC) 10 MG tablet Take 1 tablet (10 mg total) by mouth once daily.    aspirin (ECOTRIN) 81 MG EC tablet Take 81 mg by mouth every morning.     atorvastatin (LIPITOR) 80 MG tablet Take 80 mg by mouth once daily.    cholestyramine (QUESTRAN) 4 gram packet Take 1 packet (4 g total) by mouth 2 (two) times daily.    coenzyme Q10 100 mg capsule Take 100 mg by mouth every morning.    ergocalciferol (VITAMIN D2) 50,000 unit Cap Take 1 capsule (50,000 Units total) by mouth every 7 days.    ferrous sulfate 325 (65 FE) MG EC tablet Take 1 tablet (325 mg total) by mouth once daily.    fish oil-omega-3 fatty acids 300-1,000 mg capsule Take 1 capsule by mouth every morning.    fluticasone-salmeterol diskus inhaler 250-50 mcg Inhale 1 puff into the lungs 2 (two) times daily.    furosemide (LASIX) 20 MG tablet Take 2 tablets (40 mg total) by mouth once daily.    gabapentin  (NEURONTIN) 100 MG capsule Take 1 capsule (100 mg total) by mouth every evening.    ipratropium-albuteroL (COMBIVENT RESPIMAT)  mcg/actuation inhaler Inhale 2 puffs into the lungs every 4 (four) hours as needed for Shortness of Breath. Rescue    isosorbide mononitrate (IMDUR) 30 MG 24 hr tablet Take 1 tablet (30 mg total) by mouth once daily.    lisinopriL 10 MG tablet Take 1 tablet (10 mg total) by mouth once daily. HOLD UNTIL OTHERWISE DIRECTED BY PRIMARY CARE PROVIDER    lovastatin (MEVACOR) 40 MG tablet Take 1 tablet (40 mg total) by mouth every other day.    metoprolol succinate (TOPROL-XL) 50 MG 24 hr tablet Take 1 tablet (50 mg total) by mouth once daily.    NARCAN 4 mg/actuation Spry     nitroGLYCERIN 0.2 mg/hr TD PT24 (NITRODUR) 0.2 mg/hr APPLY 1 PATCH TOPICALLY ONCE DAILY TO LEG.    nitroGLYCERIN 0.4 MG/DOSE TL SPRY (NITROLINGUAL) 400 mcg/spray spray USE ONE SPRAY UNDER THE TONGUE EVERY 5 MINUTES AS NEEDED FOR CHEST PAIN.  DO NOT EXCEED A TOTAL OF 3 SPRAYS IN 15 MINUTES    ondansetron (ZOFRAN-ODT) 4 MG TbDL Take 2 tablets (8 mg total) by mouth every 6 (six) hours as needed.    pantoprazole (PROTONIX) 40 MG tablet Take 1 tablet (40 mg total) by mouth once daily.    temazepam (RESTORIL) 15 mg Cap Take 15 mg by mouth nightly as needed.    ticagrelor (BRILINTA) 90 mg tablet Take 1 tablet (90 mg total) by mouth 2 (two) times a day.    triamcinolone acetonide 0.1% (KENALOG) 0.1 % cream Apply topically 2 (two) times daily. For legs.    umeclidinium (INCRUSE ELLIPTA) 62.5 mcg/actuation inhalation capsule Inhale 62.5 mcg into the lungs every morning. Controller    [DISCONTINUED] benazepriL (LOTENSIN) 40 MG tablet Take 1 tablet (40 mg total) by mouth once daily.     Family History       Problem Relation (Age of Onset)    Febrile seizures Mother    Heart failure Father          Tobacco Use    Smoking status: Former Smoker     Packs/day: 3.00     Years: 50.00     Pack years: 150.00     Types:  Cigarettes     Start date: 3/30/1964     Quit date: 2006     Years since quittin.1    Smokeless tobacco: Never Used    Tobacco comment: ex smoker 15 years   Substance and Sexual Activity    Alcohol use: Yes    Drug use: No    Sexual activity: Never     Review of Systems   Constitutional:  Negative for activity change and fever.   Respiratory:  Negative for shortness of breath and wheezing.    Cardiovascular:  Negative for chest pain and leg swelling.   Gastrointestinal:  Negative for abdominal distention and abdominal pain.   Genitourinary:  Negative for difficulty urinating.   Skin:  Negative for color change.   Neurological:  Negative for dizziness and facial asymmetry.   Psychiatric/Behavioral:  Negative for agitation and confusion.    All other systems reviewed and are negative.  Objective:     Vital Signs (Most Recent):  Pulse: 83 (22 1438)  Resp: (!) 25 (22 1438)  BP: (!) 142/90 (22 1433)  SpO2: 100 % (22 1438)   Vital Signs (24h Range):  Pulse:  [64-98] 83  Resp:  [17-32] 25  SpO2:  [99 %-100 %] 100 %  BP: (124-175)/(60-90) 142/90     Weight: 83.5 kg (184 lb)  Body mass index is 32.59 kg/m².    Physical Exam  Vitals and nursing note reviewed.   HENT:      Head: Normocephalic and atraumatic.      Nose: Nose normal.      Mouth/Throat:      Mouth: Mucous membranes are moist.   Eyes:      Conjunctiva/sclera: Conjunctivae normal.   Neck:      Vascular: No JVD.   Cardiovascular:      Rate and Rhythm: Normal rate.      Heart sounds: Normal heart sounds.   Pulmonary:      Effort: Pulmonary effort is normal.      Breath sounds: Normal breath sounds.   Abdominal:      General: Bowel sounds are normal.      Palpations: Abdomen is soft.   Musculoskeletal:         General: Normal range of motion.      Cervical back: Normal range of motion.   Skin:     General: Skin is warm.   Neurological:      Mental Status: She is alert and oriented to person, place, and time.   Psychiatric:          Mood and Affect: Mood normal.           Significant Labs: All pertinent labs within the past 24 hours have been reviewed.  BMP:   Recent Labs   Lab 04/07/22  1215   *      K 4.9      CO2 27   BUN 33*   CREATININE 1.3   CALCIUM 8.9     CBC:   Recent Labs   Lab 04/07/22  1215   WBC 8.33   HGB 9.7*   HCT 31.4*        CMP:   Recent Labs   Lab 04/07/22  1215      K 4.9      CO2 27   *   BUN 33*   CREATININE 1.3   CALCIUM 8.9   PROT 6.3   ALBUMIN 3.3*   BILITOT 0.7   ALKPHOS 73   AST 17   ALT 16   ANIONGAP 7*   EGFRNONAA 40.5*       Significant Imaging: I have reviewed all pertinent imaging results/findings within the past 24 hours.    Assessment/Plan:     Active Hospital Problems    Diagnosis    NSTEMI (non-ST elevated myocardial infarction)    Chronic diastolic congestive heart failure    History of colon cancer    Class 2 obesity in adult    CKD (chronic kidney disease), stage III    Diabetic peripheral vascular disease    DM type 2, controlled, with complication    Empyema lung    Hypertension    Coronary artery disease, multivessel with history of previous PCI    Hypercholesterolemia    Essential hypertension, benign    Coronary atherosclerosis       PLAN   To do med reconciliation when ready   Continue home ACS /CAD treatments   Add renexa , increase imdur dose   Patient may benefit from transfer to her high risk PCI  cardiac MD   Consult cardiology  Medical management for now    Elevate leg         VTE Risk Mitigation (From admission, onward)         Ordered     IP VTE HIGH RISK PATIENT  Once         04/07/22 1343     Place sequential compression device  Until discontinued         04/07/22 1343                   Elio Grant MD  Department of Hospital Medicine   [facility] - Emergency Dept

## 2022-04-07 NOTE — TELEPHONE ENCOUNTER
Patient has a significant hx of COPD and advanced cardiac disease, awaiting stent placement, but was medically too unstable to have the surgery.  She was discharged from SNF to home several days ago, while getting ready for an appt this afternoon with her cardiologist, Dr.Rajan Chappell, she began to have worsening chest pains, daughter states they are lasting more than 5 minutes in duration.  She is no more sob than her usual, no other significant cardiac symptoms.  I advised she call 911 to bring her to the ED immediately, daughter asked if she could bring her by car, but I reiterated 911.    Reason for Disposition   Chest pain lasting longer than 5 minutes and ANY of the following:* Over 44 years old* Over 30 years old and at least one cardiac risk factor (e.g., diabetes mellitus, high blood pressure, high cholesterol, smoker, or strong family history of heart disease)* History of heart disease (i.e., angina, heart attack, heart failure, bypass surgery, takes nitroglycerin)* Pain is crushing, pressure-like, or heavy    Additional Information   Negative: SEVERE difficulty breathing (e.g., struggling for each breath, speaks in single words)   Negative: Passed out (i.e., fainted, collapsed and was not responding)   Negative: Difficult to awaken or acting confused (e.g., disoriented, slurred speech)   Negative: Shock suspected (e.g., cold/pale/clammy skin, too weak to stand, low BP, rapid pulse)    Protocols used: CHEST PAIN-A-OH

## 2022-04-07 NOTE — SUBJECTIVE & OBJECTIVE
Past Medical History:   Diagnosis Date    CAD (coronary artery disease)     Cancer     colon    CHF (congestive heart failure)     Colitis     Colon cancer     COPD (chronic obstructive pulmonary disease)     Decreased hearing     Diabetes mellitus     Diabetes mellitus, type 2     Hypercholesterolemia     Hypertension     Hypoxemia     Insomnia     Obesity     Osteoarthritis        Past Surgical History:   Procedure Laterality Date    ANGIOGRAM, CORONARY, WITH LEFT HEART CATHETERIZATION N/A 2/10/2022    Procedure: Angiogram, Coronary, with Left Heart Cath;  Surgeon: Cristian Benjamin MD;  Location: [facility] CATH/EP LAB;  Service: Cardiology;  Laterality: N/A;    CARDIAC CATHETERIZATION      CHOLECYSTECTOMY      COLECTOMY      CORONARY ANGIOPLASTY      CORONARY STENT PLACEMENT      EXTERNAL EAR SURGERY      EYE SURGERY      FLEXIBLE SIGMOIDOSCOPY N/A 9/19/2019    Procedure: SIGMOIDOSCOPY, FLEXIBLE;  Surgeon: Biju Kramer III, MD;  Location: [facility] ENDO;  Service: Endoscopy;  Laterality: N/A;    HYSTERECTOMY      partial       Review of patient's allergies indicates:   Allergen Reactions    Iodinated contrast media     Strawberries [strawberry] Hives and Itching       No current facility-administered medications on file prior to encounter.     Current Outpatient Medications on File Prior to Encounter   Medication Sig    acetaminophen (TYLENOL) 325 MG tablet Take 2 tablets (650 mg total) by mouth every 4 (four) hours as needed.    albuterol (VENTOLIN HFA) 90 mcg/actuation inhaler Inhale 2 puffs into the lungs every 6 (six) hours as needed for Wheezing or Shortness of Breath. Rescue    amLODIPine (NORVASC) 10 MG tablet Take 1 tablet (10 mg total) by mouth once daily.    aspirin (ECOTRIN) 81 MG EC tablet Take 81 mg by mouth every morning.     atorvastatin (LIPITOR) 80 MG tablet Take 80 mg by mouth once daily.    cholestyramine (QUESTRAN) 4 gram packet Take 1 packet (4 g total) by mouth 2 (two) times daily.     coenzyme Q10 100 mg capsule Take 100 mg by mouth every morning.    ergocalciferol (VITAMIN D2) 50,000 unit Cap Take 1 capsule (50,000 Units total) by mouth every 7 days.    ferrous sulfate 325 (65 FE) MG EC tablet Take 1 tablet (325 mg total) by mouth once daily.    fish oil-omega-3 fatty acids 300-1,000 mg capsule Take 1 capsule by mouth every morning.    fluticasone-salmeterol diskus inhaler 250-50 mcg Inhale 1 puff into the lungs 2 (two) times daily.    furosemide (LASIX) 20 MG tablet Take 2 tablets (40 mg total) by mouth once daily.    gabapentin (NEURONTIN) 100 MG capsule Take 1 capsule (100 mg total) by mouth every evening.    ipratropium-albuteroL (COMBIVENT RESPIMAT)  mcg/actuation inhaler Inhale 2 puffs into the lungs every 4 (four) hours as needed for Shortness of Breath. Rescue    isosorbide mononitrate (IMDUR) 30 MG 24 hr tablet Take 1 tablet (30 mg total) by mouth once daily.    lisinopriL 10 MG tablet Take 1 tablet (10 mg total) by mouth once daily. HOLD UNTIL OTHERWISE DIRECTED BY PRIMARY CARE PROVIDER    lovastatin (MEVACOR) 40 MG tablet Take 1 tablet (40 mg total) by mouth every other day.    metoprolol succinate (TOPROL-XL) 50 MG 24 hr tablet Take 1 tablet (50 mg total) by mouth once daily.    NARCAN 4 mg/actuation Spry     nitroGLYCERIN 0.2 mg/hr TD PT24 (NITRODUR) 0.2 mg/hr APPLY 1 PATCH TOPICALLY ONCE DAILY TO LEG.    nitroGLYCERIN 0.4 MG/DOSE TL SPRY (NITROLINGUAL) 400 mcg/spray spray USE ONE SPRAY UNDER THE TONGUE EVERY 5 MINUTES AS NEEDED FOR CHEST PAIN.  DO NOT EXCEED A TOTAL OF 3 SPRAYS IN 15 MINUTES    ondansetron (ZOFRAN-ODT) 4 MG TbDL Take 2 tablets (8 mg total) by mouth every 6 (six) hours as needed.    pantoprazole (PROTONIX) 40 MG tablet Take 1 tablet (40 mg total) by mouth once daily.    temazepam (RESTORIL) 15 mg Cap Take 15 mg by mouth nightly as needed.    ticagrelor (BRILINTA) 90 mg tablet Take 1 tablet (90 mg total) by mouth 2 (two) times a day.    triamcinolone acetonide  0.1% (KENALOG) 0.1 % cream Apply topically 2 (two) times daily. For legs.    umeclidinium (INCRUSE ELLIPTA) 62.5 mcg/actuation inhalation capsule Inhale 62.5 mcg into the lungs every morning. Controller    [DISCONTINUED] benazepriL (LOTENSIN) 40 MG tablet Take 1 tablet (40 mg total) by mouth once daily.     Family History       Problem Relation (Age of Onset)    Febrile seizures Mother    Heart failure Father          Tobacco Use    Smoking status: Former Smoker     Packs/day: 3.00     Years: 50.00     Pack years: 150.00     Types: Cigarettes     Start date: 3/30/1964     Quit date: 2006     Years since quittin.1    Smokeless tobacco: Never Used    Tobacco comment: ex smoker 15 years   Substance and Sexual Activity    Alcohol use: Yes    Drug use: No    Sexual activity: Never     Review of Systems   Constitutional:  Negative for activity change and fever.   Respiratory:  Negative for shortness of breath and wheezing.    Cardiovascular:  Negative for chest pain and leg swelling.   Gastrointestinal:  Negative for abdominal distention and abdominal pain.   Genitourinary:  Negative for difficulty urinating.   Skin:  Negative for color change.   Neurological:  Negative for dizziness and facial asymmetry.   Psychiatric/Behavioral:  Negative for agitation and confusion.    All other systems reviewed and are negative.  Objective:     Vital Signs (Most Recent):  Pulse: 83 (22 1438)  Resp: (!) 25 (22 1438)  BP: (!) 142/90 (22 1433)  SpO2: 100 % (22 1438)   Vital Signs (24h Range):  Pulse:  [64-98] 83  Resp:  [17-32] 25  SpO2:  [99 %-100 %] 100 %  BP: (124-175)/(60-90) 142/90     Weight: 83.5 kg (184 lb)  Body mass index is 32.59 kg/m².    Physical Exam  Vitals and nursing note reviewed.   HENT:      Head: Normocephalic and atraumatic.      Nose: Nose normal.      Mouth/Throat:      Mouth: Mucous membranes are moist.   Eyes:      Conjunctiva/sclera: Conjunctivae normal.   Neck:      Vascular:  No JVD.   Cardiovascular:      Rate and Rhythm: Normal rate.      Heart sounds: Normal heart sounds.   Pulmonary:      Effort: Pulmonary effort is normal.      Breath sounds: Normal breath sounds.   Abdominal:      General: Bowel sounds are normal.      Palpations: Abdomen is soft.   Musculoskeletal:         General: Normal range of motion.      Cervical back: Normal range of motion.   Skin:     General: Skin is warm.   Neurological:      Mental Status: She is alert and oriented to person, place, and time.   Psychiatric:         Mood and Affect: Mood normal.           Significant Labs: All pertinent labs within the past 24 hours have been reviewed.  BMP:   Recent Labs   Lab 04/07/22  1215   *      K 4.9      CO2 27   BUN 33*   CREATININE 1.3   CALCIUM 8.9     CBC:   Recent Labs   Lab 04/07/22  1215   WBC 8.33   HGB 9.7*   HCT 31.4*        CMP:   Recent Labs   Lab 04/07/22  1215      K 4.9      CO2 27   *   BUN 33*   CREATININE 1.3   CALCIUM 8.9   PROT 6.3   ALBUMIN 3.3*   BILITOT 0.7   ALKPHOS 73   AST 17   ALT 16   ANIONGAP 7*   EGFRNONAA 40.5*       Significant Imaging: I have reviewed all pertinent imaging results/findings within the past 24 hours.

## 2022-04-08 ENCOUNTER — HOSPITAL ENCOUNTER (INPATIENT)
Facility: HOSPITAL | Age: 77
LOS: 14 days | Discharge: HOME HEALTH | DRG: 280 | End: 2022-04-22
Attending: INTERNAL MEDICINE | Admitting: INTERNAL MEDICINE
Payer: MEDICARE

## 2022-04-08 VITALS
RESPIRATION RATE: 20 BRPM | SYSTOLIC BLOOD PRESSURE: 128 MMHG | TEMPERATURE: 98 F | HEART RATE: 72 BPM | BODY MASS INDEX: 31.25 KG/M2 | WEIGHT: 176.38 LBS | DIASTOLIC BLOOD PRESSURE: 60 MMHG | HEIGHT: 63 IN | OXYGEN SATURATION: 97 %

## 2022-04-08 DIAGNOSIS — R07.9 CHEST PAIN: ICD-10-CM

## 2022-04-08 DIAGNOSIS — N17.9 AKI (ACUTE KIDNEY INJURY): ICD-10-CM

## 2022-04-08 DIAGNOSIS — I25.110 CORONARY ARTERY DISEASE INVOLVING NATIVE CORONARY ARTERY OF NATIVE HEART WITH UNSTABLE ANGINA PECTORIS: ICD-10-CM

## 2022-04-08 DIAGNOSIS — R11.2 NAUSEA & VOMITING: ICD-10-CM

## 2022-04-08 DIAGNOSIS — I21.4 NSTEMI (NON-ST ELEVATED MYOCARDIAL INFARCTION): ICD-10-CM

## 2022-04-08 DIAGNOSIS — I25.10 CORONARY ARTERY DISEASE, UNSPECIFIED VESSEL OR LESION TYPE, UNSPECIFIED WHETHER ANGINA PRESENT, UNSPECIFIED WHETHER NATIVE OR TRANSPLANTED HEART: ICD-10-CM

## 2022-04-08 DIAGNOSIS — R13.10 DYSPHAGIA, UNSPECIFIED TYPE: ICD-10-CM

## 2022-04-08 DIAGNOSIS — R09.A2 GLOBUS SENSATION: Primary | ICD-10-CM

## 2022-04-08 PROBLEM — D64.9 NORMOCYTIC ANEMIA: Status: ACTIVE | Noted: 2022-04-08

## 2022-04-08 LAB
ANION GAP SERPL CALC-SCNC: 11 MMOL/L (ref 8–16)
BASOPHILS # BLD AUTO: 0.03 K/UL (ref 0–0.2)
BASOPHILS NFR BLD: 0.4 % (ref 0–1.9)
BUN SERPL-MCNC: 28 MG/DL (ref 8–23)
CALCIUM SERPL-MCNC: 9.2 MG/DL (ref 8.7–10.5)
CHLORIDE SERPL-SCNC: 109 MMOL/L (ref 95–110)
CO2 SERPL-SCNC: 24 MMOL/L (ref 23–29)
CREAT SERPL-MCNC: 1.3 MG/DL (ref 0.5–1.4)
DIFFERENTIAL METHOD: ABNORMAL
EOSINOPHIL # BLD AUTO: 0.2 K/UL (ref 0–0.5)
EOSINOPHIL NFR BLD: 2 % (ref 0–8)
ERYTHROCYTE [DISTWIDTH] IN BLOOD BY AUTOMATED COUNT: 13.6 % (ref 11.5–14.5)
EST. GFR  (AFRICAN AMERICAN): 46.7 ML/MIN/1.73 M^2
EST. GFR  (NON AFRICAN AMERICAN): 40.5 ML/MIN/1.73 M^2
GLUCOSE SERPL-MCNC: 88 MG/DL (ref 70–110)
HCT VFR BLD AUTO: 32.9 % (ref 37–48.5)
HGB BLD-MCNC: 9.8 G/DL (ref 12–16)
IMM GRANULOCYTES # BLD AUTO: 0.03 K/UL (ref 0–0.04)
IMM GRANULOCYTES NFR BLD AUTO: 0.4 % (ref 0–0.5)
LYMPHOCYTES # BLD AUTO: 1.1 K/UL (ref 1–4.8)
LYMPHOCYTES NFR BLD: 13.3 % (ref 18–48)
MCH RBC QN AUTO: 28.6 PG (ref 27–31)
MCHC RBC AUTO-ENTMCNC: 29.8 G/DL (ref 32–36)
MCV RBC AUTO: 96 FL (ref 82–98)
MONOCYTES # BLD AUTO: 0.5 K/UL (ref 0.3–1)
MONOCYTES NFR BLD: 6.6 % (ref 4–15)
NEUTROPHILS # BLD AUTO: 6.4 K/UL (ref 1.8–7.7)
NEUTROPHILS NFR BLD: 77.3 % (ref 38–73)
NRBC BLD-RTO: 0 /100 WBC
PLATELET # BLD AUTO: 148 K/UL (ref 150–450)
PMV BLD AUTO: 11.6 FL (ref 9.2–12.9)
POTASSIUM SERPL-SCNC: 4.5 MMOL/L (ref 3.5–5.1)
RBC # BLD AUTO: 3.43 M/UL (ref 4–5.4)
SODIUM SERPL-SCNC: 144 MMOL/L (ref 136–145)
WBC # BLD AUTO: 8.2 K/UL (ref 3.9–12.7)

## 2022-04-08 PROCEDURE — 25000003 PHARM REV CODE 250: Performed by: INTERNAL MEDICINE

## 2022-04-08 PROCEDURE — 93010 EKG 12-LEAD: ICD-10-PCS | Mod: ,,, | Performed by: INTERNAL MEDICINE

## 2022-04-08 PROCEDURE — 80048 BASIC METABOLIC PNL TOTAL CA: CPT | Performed by: INTERNAL MEDICINE

## 2022-04-08 PROCEDURE — 85730 THROMBOPLASTIN TIME PARTIAL: CPT | Performed by: INTERNAL MEDICINE

## 2022-04-08 PROCEDURE — 85025 COMPLETE CBC W/AUTO DIFF WBC: CPT | Mod: 91 | Performed by: INTERNAL MEDICINE

## 2022-04-08 PROCEDURE — 25000242 PHARM REV CODE 250 ALT 637 W/ HCPCS

## 2022-04-08 PROCEDURE — 93005 ELECTROCARDIOGRAM TRACING: CPT

## 2022-04-08 PROCEDURE — 99900031 HC PATIENT EDUCATION (STAT)

## 2022-04-08 PROCEDURE — 99223 1ST HOSP IP/OBS HIGH 75: CPT | Mod: ,,, | Performed by: INTERNAL MEDICINE

## 2022-04-08 PROCEDURE — 94640 AIRWAY INHALATION TREATMENT: CPT

## 2022-04-08 PROCEDURE — 20600001 HC STEP DOWN PRIVATE ROOM

## 2022-04-08 PROCEDURE — 94760 N-INVAS EAR/PLS OXIMETRY 1: CPT

## 2022-04-08 PROCEDURE — 99223 PR INITIAL HOSPITAL CARE,LEVL III: ICD-10-PCS | Mod: ,,, | Performed by: INTERNAL MEDICINE

## 2022-04-08 PROCEDURE — 93005 ELECTROCARDIOGRAM TRACING: CPT | Performed by: GENERAL PRACTICE

## 2022-04-08 PROCEDURE — 93010 ELECTROCARDIOGRAM REPORT: CPT | Mod: ,,, | Performed by: GENERAL PRACTICE

## 2022-04-08 PROCEDURE — 63600175 PHARM REV CODE 636 W HCPCS: Performed by: INTERNAL MEDICINE

## 2022-04-08 PROCEDURE — 86901 BLOOD TYPING SEROLOGIC RH(D): CPT | Performed by: INTERNAL MEDICINE

## 2022-04-08 PROCEDURE — 93010 EKG 12-LEAD: ICD-10-PCS | Mod: ,,, | Performed by: GENERAL PRACTICE

## 2022-04-08 PROCEDURE — 84484 ASSAY OF TROPONIN QUANT: CPT | Performed by: INTERNAL MEDICINE

## 2022-04-08 PROCEDURE — 93010 ELECTROCARDIOGRAM REPORT: CPT | Mod: ,,, | Performed by: INTERNAL MEDICINE

## 2022-04-08 PROCEDURE — 25000242 PHARM REV CODE 250 ALT 637 W/ HCPCS: Performed by: INTERNAL MEDICINE

## 2022-04-08 PROCEDURE — 27000221 HC OXYGEN, UP TO 24 HOURS

## 2022-04-08 PROCEDURE — A4216 STERILE WATER/SALINE, 10 ML: HCPCS | Performed by: INTERNAL MEDICINE

## 2022-04-08 PROCEDURE — 85025 COMPLETE CBC W/AUTO DIFF WBC: CPT | Performed by: INTERNAL MEDICINE

## 2022-04-08 PROCEDURE — 85610 PROTHROMBIN TIME: CPT | Performed by: INTERNAL MEDICINE

## 2022-04-08 PROCEDURE — 36415 COLL VENOUS BLD VENIPUNCTURE: CPT | Performed by: INTERNAL MEDICINE

## 2022-04-08 PROCEDURE — G0378 HOSPITAL OBSERVATION PER HR: HCPCS

## 2022-04-08 RX ORDER — METOPROLOL SUCCINATE 50 MG/1
50 TABLET, EXTENDED RELEASE ORAL DAILY
Status: DISCONTINUED | OUTPATIENT
Start: 2022-04-09 | End: 2022-04-22 | Stop reason: HOSPADM

## 2022-04-08 RX ORDER — NITROGLYCERIN 0.4 MG/1
0.4 TABLET SUBLINGUAL EVERY 5 MIN PRN
Status: DISCONTINUED | OUTPATIENT
Start: 2022-04-08 | End: 2022-04-08 | Stop reason: HOSPADM

## 2022-04-08 RX ORDER — POLYETHYLENE GLYCOL 3350 17 G/17G
17 POWDER, FOR SOLUTION ORAL DAILY
Status: DISCONTINUED | OUTPATIENT
Start: 2022-04-09 | End: 2022-04-22 | Stop reason: HOSPADM

## 2022-04-08 RX ORDER — NITROGLYCERIN 400 UG/1
1 SPRAY ORAL EVERY 5 MIN PRN
Status: DISCONTINUED | OUTPATIENT
Start: 2022-04-08 | End: 2022-04-08 | Stop reason: SDUPTHER

## 2022-04-08 RX ORDER — ALBUTEROL SULFATE 90 UG/1
2 AEROSOL, METERED RESPIRATORY (INHALATION) EVERY 6 HOURS PRN
Status: DISCONTINUED | OUTPATIENT
Start: 2022-04-08 | End: 2022-04-16

## 2022-04-08 RX ORDER — GABAPENTIN 100 MG/1
100 CAPSULE ORAL NIGHTLY
Status: DISCONTINUED | OUTPATIENT
Start: 2022-04-08 | End: 2022-04-22 | Stop reason: HOSPADM

## 2022-04-08 RX ORDER — EPINEPHRINE 0.22MG
100 AEROSOL WITH ADAPTER (ML) INHALATION EVERY MORNING
Status: DISCONTINUED | OUTPATIENT
Start: 2022-04-09 | End: 2022-04-22 | Stop reason: HOSPADM

## 2022-04-08 RX ORDER — LISINOPRIL 10 MG/1
10 TABLET ORAL DAILY
Status: DISCONTINUED | OUTPATIENT
Start: 2022-04-09 | End: 2022-04-13

## 2022-04-08 RX ORDER — FLUTICASONE FUROATE AND VILANTEROL 100; 25 UG/1; UG/1
1 POWDER RESPIRATORY (INHALATION) DAILY
Refills: 1 | Status: DISCONTINUED | OUTPATIENT
Start: 2022-04-09 | End: 2022-04-22 | Stop reason: HOSPADM

## 2022-04-08 RX ORDER — TRIAMCINOLONE ACETONIDE 1 MG/G
CREAM TOPICAL 2 TIMES DAILY
Status: DISCONTINUED | OUTPATIENT
Start: 2022-04-08 | End: 2022-04-16

## 2022-04-08 RX ORDER — AMLODIPINE BESYLATE 10 MG/1
10 TABLET ORAL DAILY
Status: DISCONTINUED | OUTPATIENT
Start: 2022-04-09 | End: 2022-04-13

## 2022-04-08 RX ORDER — NITROGLYCERIN 0.4 MG/1
0.4 TABLET SUBLINGUAL EVERY 5 MIN PRN
Status: DISCONTINUED | OUTPATIENT
Start: 2022-04-08 | End: 2022-04-22 | Stop reason: HOSPADM

## 2022-04-08 RX ORDER — ERGOCALCIFEROL 1.25 MG/1
50000 CAPSULE ORAL
Status: DISCONTINUED | OUTPATIENT
Start: 2022-04-09 | End: 2022-04-22 | Stop reason: HOSPADM

## 2022-04-08 RX ORDER — ACETAMINOPHEN 325 MG/1
650 TABLET ORAL EVERY 4 HOURS PRN
Status: DISCONTINUED | OUTPATIENT
Start: 2022-04-08 | End: 2022-04-22 | Stop reason: HOSPADM

## 2022-04-08 RX ORDER — LANOLIN ALCOHOL/MO/W.PET/CERES
1 CREAM (GRAM) TOPICAL DAILY
Refills: 0 | Status: DISCONTINUED | OUTPATIENT
Start: 2022-04-09 | End: 2022-04-16

## 2022-04-08 RX ORDER — FUROSEMIDE 40 MG/1
40 TABLET ORAL DAILY
Status: DISCONTINUED | OUTPATIENT
Start: 2022-04-09 | End: 2022-04-13

## 2022-04-08 RX ORDER — HEPARIN SODIUM,PORCINE/D5W 25000/250
0-40 INTRAVENOUS SOLUTION INTRAVENOUS CONTINUOUS
Status: DISCONTINUED | OUTPATIENT
Start: 2022-04-08 | End: 2022-04-09

## 2022-04-08 RX ORDER — SODIUM CHLORIDE 0.9 % (FLUSH) 0.9 %
10 SYRINGE (ML) INJECTION
Status: DISCONTINUED | OUTPATIENT
Start: 2022-04-08 | End: 2022-04-22 | Stop reason: HOSPADM

## 2022-04-08 RX ORDER — CHOLESTYRAMINE 4 G/9G
1 POWDER, FOR SUSPENSION ORAL 2 TIMES DAILY
Status: DISCONTINUED | OUTPATIENT
Start: 2022-04-08 | End: 2022-04-11

## 2022-04-08 RX ORDER — ASPIRIN 81 MG/1
81 TABLET ORAL EVERY MORNING
Status: DISCONTINUED | OUTPATIENT
Start: 2022-04-09 | End: 2022-04-22 | Stop reason: HOSPADM

## 2022-04-08 RX ORDER — PANTOPRAZOLE SODIUM 40 MG/1
40 TABLET, DELAYED RELEASE ORAL DAILY
Status: DISCONTINUED | OUTPATIENT
Start: 2022-04-09 | End: 2022-04-15

## 2022-04-08 RX ORDER — ASPIRIN 325 MG
325 TABLET ORAL ONCE
Status: COMPLETED | OUTPATIENT
Start: 2022-04-08 | End: 2022-04-08

## 2022-04-08 RX ORDER — ATORVASTATIN CALCIUM 20 MG/1
80 TABLET, FILM COATED ORAL DAILY
Status: DISCONTINUED | OUTPATIENT
Start: 2022-04-09 | End: 2022-04-22 | Stop reason: HOSPADM

## 2022-04-08 RX ORDER — ISOSORBIDE MONONITRATE 30 MG/1
30 TABLET, EXTENDED RELEASE ORAL DAILY
Status: DISCONTINUED | OUTPATIENT
Start: 2022-04-09 | End: 2022-04-19

## 2022-04-08 RX ORDER — NITROGLYCERIN 0.4 MG/1
TABLET SUBLINGUAL
Status: COMPLETED
Start: 2022-04-08 | End: 2022-04-08

## 2022-04-08 RX ORDER — IPRATROPIUM BROMIDE AND ALBUTEROL SULFATE 2.5; .5 MG/3ML; MG/3ML
3 SOLUTION RESPIRATORY (INHALATION) EVERY 6 HOURS PRN
Refills: 3 | Status: DISCONTINUED | OUTPATIENT
Start: 2022-04-08 | End: 2022-04-12

## 2022-04-08 RX ORDER — ZOLPIDEM TARTRATE 5 MG/1
5 TABLET ORAL NIGHTLY PRN
Status: DISCONTINUED | OUTPATIENT
Start: 2022-04-08 | End: 2022-04-22 | Stop reason: HOSPADM

## 2022-04-08 RX ORDER — ONDANSETRON 2 MG/ML
4 INJECTION INTRAMUSCULAR; INTRAVENOUS EVERY 8 HOURS PRN
Status: DISCONTINUED | OUTPATIENT
Start: 2022-04-08 | End: 2022-04-22 | Stop reason: HOSPADM

## 2022-04-08 RX ADMIN — LISINOPRIL 10 MG: 10 TABLET ORAL at 09:04

## 2022-04-08 RX ADMIN — ISOSORBIDE MONONITRATE 60 MG: 60 TABLET, EXTENDED RELEASE ORAL at 09:04

## 2022-04-08 RX ADMIN — NITROGLYCERIN: 0.4 TABLET SUBLINGUAL at 06:04

## 2022-04-08 RX ADMIN — ASPIRIN 325 MG ORAL TABLET 325 MG: 325 PILL ORAL at 10:04

## 2022-04-08 RX ADMIN — FUROSEMIDE 40 MG: 40 TABLET ORAL at 09:04

## 2022-04-08 RX ADMIN — ASPIRIN 81 MG: 81 TABLET, DELAYED RELEASE ORAL at 09:04

## 2022-04-08 RX ADMIN — METOPROLOL SUCCINATE 50 MG: 50 TABLET, EXTENDED RELEASE ORAL at 09:04

## 2022-04-08 RX ADMIN — SODIUM CHLORIDE, PRESERVATIVE FREE 10 ML: 5 INJECTION INTRAVENOUS at 09:04

## 2022-04-08 RX ADMIN — IPRATROPIUM BROMIDE AND ALBUTEROL SULFATE 3 ML: .5; 3 SOLUTION RESPIRATORY (INHALATION) at 06:04

## 2022-04-08 RX ADMIN — TICAGRELOR 90 MG: 90 TABLET ORAL at 09:04

## 2022-04-08 RX ADMIN — HEPARIN SODIUM 12 UNITS/KG/HR: 5000 INJECTION INTRAVENOUS; SUBCUTANEOUS at 10:04

## 2022-04-08 RX ADMIN — TICAGRELOR 180 MG: 90 TABLET ORAL at 10:04

## 2022-04-08 RX ADMIN — GABAPENTIN 100 MG: 100 CAPSULE ORAL at 10:04

## 2022-04-08 RX ADMIN — TRIAMCINOLONE ACETONIDE: 1 CREAM TOPICAL at 10:04

## 2022-04-08 RX ADMIN — CHOLESTYRAMINE 4 G: 4 POWDER, FOR SUSPENSION ORAL at 09:04

## 2022-04-08 RX ADMIN — AMLODIPINE BESYLATE 10 MG: 5 TABLET ORAL at 09:04

## 2022-04-08 RX ADMIN — RANOLAZINE 500 MG: 500 TABLET, EXTENDED RELEASE ORAL at 09:04

## 2022-04-08 RX ADMIN — CHOLESTYRAMINE 4 G: 4 POWDER, FOR SUSPENSION ORAL at 10:04

## 2022-04-08 RX ADMIN — FLUTICASONE FUROATE AND VILANTEROL TRIFENATATE 1 PUFF: 100; 25 POWDER RESPIRATORY (INHALATION) at 09:04

## 2022-04-08 NOTE — NURSING
SBAR report given to Scarlett Ni RN- receiving RN @ [facility].  Questions answered to her satisfaction.  Awaiting transport.

## 2022-04-08 NOTE — CARE UPDATE
04/08/22 0943   Patient Assessment/Suction   Level of Consciousness (AVPU) alert   Respiratory Effort Unlabored;Normal   Expansion/Accessory Muscles/Retractions no retractions;no use of accessory muscles   All Lung Fields Breath Sounds coarse;diminished   Cough Frequency infrequent   Cough Type fair   Secretions Amount moderate   Secretions Color yellow;creamy   PRE-TX-O2   O2 Device (Oxygen Therapy) nasal cannula   $ Is the patient on Low Flow Oxygen? Yes   Flow (L/min) 2.5   SpO2 99 %   Pulse Oximetry Type Intermittent   $ Pulse Oximetry - Single Charge Pulse Oximetry - Single   Pulse 99   Resp (!) 22   Inhaler   $ Inhaler Charges MDI (Metered Dose Inahler) Treatment   Daily Review of Necessity (Inhaler) completed   Respiratory Treatment Status (Inhaler) given   Treatment Route (Inhaler) breath hold   Patient Position (Inhaler) semi-Solorzano's   Post Treatment Assessment (Inhaler) breath sounds unchanged   Signs of Intolerance (Inhaler) none   Breath Sounds Post-Respiratory Treatment   Throughout All Fields Post-Treatment All Fields   Throughout All Fields Post-Treatment Anterior:;Posterior:;diminished   Post-treatment Heart Rate (beats/min) 99   Post-treatment Resp Rate (breaths/min) 22   Education   $ Education Bronchodilator;15 min

## 2022-04-08 NOTE — PLAN OF CARE
Problem: Impaired Wound Healing  Goal: Optimal Wound Healing  Outcome: Ongoing, Progressing  Intervention: Promote Wound Healing  Flowsheets (Taken 4/8/2022 1417)  Oral Nutrition Promotion: calorie-dense liquids provided

## 2022-04-08 NOTE — PLAN OF CARE
04/08/22 1532   Final Note   Assessment Type Final Discharge Note   Anticipated Discharge Disposition Short Term   Pt transferring to [facility].

## 2022-04-08 NOTE — CONSULTS
"[facility]  Adult Nutrition   Consult Note (Initial Assessment)     SUMMARY     Recommendations  Recommendation/Intervention:   1) RD recommends and has added Ensure Enlive and NIMESH BID to increase kcals and protein in diet.   2) RD recommends and has added 1800 kcals diabetic diet to cardiac diet order.   3) Continue to get dietary preferences daily.    Goals:   Patient to have increased wound healing and to meet her estimated energy and protein needs with her PO diet and ONS's.    Nutrition Goal Status: new    Communication of RD Recs: other (comment) (patient)    Dietitian Rounds Brief  Consult for Altered Skin Integrity: Saw patient today to see how she has been eating. She tells me that she has been eating about 50% of her meals. I encouraged her to try to eat 75% or more and asked if she would like an Ensure protein shake when her intake was insufficient and she said yes but no strawberry since she is allergic to them. I also informed her tht I was going to add some NIMESH to 2 of her meal trays and to be sure to drink them all for her skin integrity. She said she would do her best. Her LBM was yesterday. Will follow prn till discharge.    Diet order:   Current Diet Order: Cardiac     % Intake of Estimated Energy Needs: 50 - 75 %  % Meal Intake: 50 - 75 %    Energy Calories Required: not meeting needs  Protein Required: not meeting needs  Fluid Required: meeting needs  Tolerance: tolerating    Anthropometrics  Temp: 99.2 °F (37.3 °C)  Height Method: Stated  Height: 5' 3" (160 cm)  Height (inches): 63 in  Weight Method: Bed Scale  Weight: 80 kg (176 lb 5.9 oz)  Weight (lb): 176.37 lb  Ideal Body Weight (IBW), Female: 115 lb  % Ideal Body Weight, Female (lb): 160 %  BMI (Calculated): 31.3  BMI Grade: 30 - 34.9- obesity - grade I       Estimated/Assessed Needs  Weight Used For Calorie Calculations: 80 kg (176 lb 5.9 oz)  Energy Calorie Requirements (kcal): 7212-6755 kcals/day (20-25 kcals/kg " ABW)  Energy Need Method: Kcal/kg     Weight Used For Protein Calculations: 52 kg (114 lb 10.2 oz)  Fluid Requirements (mL): 1 ml/kcal or per MD  Estimated Fluid Requirement Method: RDA Method  RDA Method (mL): 1600       Reason for Assessment  Reason For Assessment: consult  Diagnosis: cardiac disease, diabetes diagnosis/complications, renal disease, cancer diagnosis/related complications, pulmonary disease, stroke/CVA  Relevant Medical History: Hypercholesterolemia, Essential hypertension, benign, Coronary atherosclerosis, DM type 2, controlled, with complication, Empyema lung, Hypertension, Coronary artery disease, multivessel with history of previous PCI, Diabetic peripheral vascular disease, CKD (chronic kidney disease), stage III, Class 2 obesity in adult, History of colon cancer, Chronic diastolic congestive heart failure, NSTEMI (non-ST elevated myocardial infarction)  Interdisciplinary Rounds: did not attend    Nutrition/Diet History  Food Allergies: other (see comments) (Iodinated Contrast Media and Strawberries (strawberry))  Factors Affecting Nutritional Intake: None identified at this time    Weight History:  Wt Readings from Last 5 Encounters:   04/08/22 80 kg (176 lb 5.9 oz)   04/01/22 78.7 kg (173 lb 8 oz)   03/17/22 87.2 kg (192 lb 3.9 oz)   03/15/22 (!) 187.4 kg (413 lb 2.3 oz)   02/21/22 82.9 kg (182 lb 12.2 oz)        Lab/Procedures/Meds: Pertinent Labs/Meds Reviewed    Medications:Pertinent Medications Reviewed    Scheduled Meds:   amLODIPine  10 mg Oral Daily    aspirin  81 mg Oral QAM    atorvastatin  80 mg Oral Daily    cholestyramine-aspartame  4 g Oral BID    fluticasone furoate-vilanteroL  1 puff Inhalation Daily    furosemide  40 mg Oral Daily    gabapentin  100 mg Oral QHS    isosorbide mononitrate  60 mg Oral Daily    lisinopriL  10 mg Oral Daily    metoprolol succinate  50 mg Oral Daily    ranolazine  500 mg Oral BID    sodium chloride 0.9%  10 mL Intravenous Q12H     ticagrelor  90 mg Oral BID    tiotropium bromide  2 puff Inhalation Daily     Continuous Infusions:  PRN Meds:.albuterol, albuterol-ipratropium, hydrALAZINE, nitroGLYCERIN    Labs: Pertinent Labs Reviewed    Clinical Chemistry:  Recent Labs   Lab 04/07/22 1215 04/08/22  0414    144   K 4.9 4.5    109   CO2 27 24   * 88   BUN 33* 28*   CREATININE 1.3 1.3   CALCIUM 8.9 9.2   PROT 6.3  --    ALBUMIN 3.3*  --    BILITOT 0.7  --    ALKPHOS 73  --    AST 17  --    ALT 16  --    ANIONGAP 7* 11   ESTGFRAFRICA 46.7* 46.7*   EGFRNONAA 40.5* 40.5*     CBC:   Recent Labs   Lab 04/08/22  0414   WBC 8.20   RBC 3.43*   HGB 9.8*   HCT 32.9*   *   MCV 96   MCH 28.6   MCHC 29.8*     Cardiac Profile:  Recent Labs   Lab 04/07/22  1215 04/07/22  1854   *  --    TROPONINI 0.058* 0.066*     Monitor and Evaluation  Food and Nutrient Intake: energy intake, food and beverage intake  Food and Nutrient Adminstration: diet order  Knowledge/Beliefs/Attitudes: food and nutrition knowledge/skill, beliefs and attitudes  Physical Activity and Function: nutrition-related ADLs and IADLs, factors affecting access to physical activity  Anthropometric Measurements: weight, weight change, body mass index  Biochemical Data, Medical Tests and Procedures: lipid profile, inflammatory profile, glucose/endocrine profile, gastrointestinal profile, electrolyte and renal panel  Nutrition-Focused Physical Findings: overall appearance     Nutrition Risk  Level of Risk/Frequency of Follow-up: moderate     Nutrition Follow-Up  RD Follow-up?: Yes      Michelle Elias, GARCIA 04/08/2022 2:18 PM

## 2022-04-08 NOTE — PROGRESS NOTES
[facility] Medicine  Progress Note    Patient Name: Marisol Kerr  MRN: [id number]  Patient Class: OP- Observation   Admission Date: 4/7/2022  Length of Stay: 0 days  Attending Physician: Elio Grant MD  Primary Care Provider: Khadar Dwyer MD        Subjective:     Principal Problem:<principal problem not specified>        HPI:      76 year old female with PMHx of DM2, HTN, HLD, CKD, COPD on home O2  recent STEMI s/p LHC who is not amenable for CABG came with CP .  She had left heart catheterization  here and [place] [facility] [place] PCI and found  rectus sheet hematoma/bleeding  and embolization was done recently and admitted to rehab there and got discharged recently . .   chest pain started this morning and substernal and currently resolved .  Mild troponin elevation and admitted . Actually patient has appointment today with [facility] possible  interventionist but admitted here .  On exam , no CP , no leg pain or SOB . Deny abdominal pain and Hb is stable  .  Bilateral significant leg edema noted but patient said this is even better than before.      Overview/Hospital Course:  Had CP and sob overnight   D/w cardiology and for High RISK PCI transfer   Continue present Mx        Interval History:     Review of Systems  As per subjective   Objective:     Vital Signs (Most Recent):  Temp: 99.2 °F (37.3 °C) (04/08/22 1146)  Pulse: 67 (04/08/22 1146)  Resp: 20 (04/08/22 1146)  BP: (!) 138/59 (04/08/22 1146)  SpO2: 98 % (04/08/22 1146)   Vital Signs (24h Range):  Temp:  [97.6 °F (36.4 °C)-99.2 °F (37.3 °C)] 99.2 °F (37.3 °C)  Pulse:  [] 67  Resp:  [16-28] 20  SpO2:  [97 %-100 %] 98 %  BP: (137-197)/(59-90) 138/59     Weight: 80 kg (176 lb 5.9 oz)  Body mass index is 31.24 kg/m².    Intake/Output Summary (Last 24 hours) at 4/8/2022 1417  Last data filed at 4/8/2022 0940  Gross per 24 hour   Intake --   Output 1600 ml   Net -1600 ml      Physical Exam  Vitals and nursing note  reviewed.   HENT:      Head: Normocephalic and atraumatic.      Nose: Nose normal.   Eyes:      Conjunctiva/sclera: Conjunctivae normal.   Neck:      Vascular: No JVD.   Cardiovascular:      Rate and Rhythm: Normal rate.      Heart sounds: Normal heart sounds.   Pulmonary:      Effort: Pulmonary effort is normal.   Abdominal:      Palpations: Abdomen is soft.   Musculoskeletal:         General: Normal range of motion.      Cervical back: Normal range of motion.   Skin:     General: Skin is warm.   Neurological:      Mental Status: She is alert and oriented to person, place, and time.       Significant Labs: All pertinent labs within the past 24 hours have been reviewed.  CMP:   Recent Labs   Lab 04/07/22  1215 04/08/22  0414    144   K 4.9 4.5    109   CO2 27 24   * 88   BUN 33* 28*   CREATININE 1.3 1.3   CALCIUM 8.9 9.2   PROT 6.3  --    ALBUMIN 3.3*  --    BILITOT 0.7  --    ALKPHOS 73  --    AST 17  --    ALT 16  --    ANIONGAP 7* 11   EGFRNONAA 40.5* 40.5*     Cardiac Markers:   Recent Labs   Lab 04/07/22  1215   *     Coagulation:   Recent Labs   Lab 04/07/22  1215   INR 1.1     Lactic Acid: No results for input(s): LACTATE in the last 48 hours.    Significant Imaging: I have reviewed all pertinent imaging results/findings within the past 24 hours.      Assessment/Plan:      Active Hospital Problems    Diagnosis    NSTEMI (non-ST elevated myocardial infarction)    Chronic diastolic congestive heart failure    History of colon cancer    Class 2 obesity in adult    CKD (chronic kidney disease), stage III    Diabetic peripheral vascular disease    DM type 2, controlled, with complication    Empyema lung    Hypertension    Coronary artery disease, multivessel with history of previous PCI    Hypercholesterolemia    Essential hypertension, benign    Coronary atherosclerosis         PLAN   For High risk PCI transfer . Initiated   Continue home ACS /CAD treatments   Added renexa ,  increased imdur dose   Medical management for now    Elevate leg   EKG PRN and nitro      VTE Risk Mitigation (From admission, onward)         Ordered     IP VTE HIGH RISK PATIENT  Once         04/07/22 1343     Place sequential compression device  Until discontinued         04/07/22 1343                Discharge Planning   LUZ:      Code Status: Full Code   Is the patient medically ready for discharge?:     Reason for patient still in hospital (select all that apply): Treatment  Discharge Plan A: Home, Home with family                  Elio Grant MD  Department of Hospital Medicine   [facility]

## 2022-04-08 NOTE — HOSPITAL COURSE
HPI:     76 year old female with PMHx of DM2, HTN, HLD, CKD, COPD on home O2  recent STEMI and was transferred to [facility] for high risk PCI . She had done  s/p LHC here  and she is deem  not amenable for CABG, came with CP .   at [facility] , found to have  rectus sheet hematoma/bleeding and  IR intervention and embolization was done recently and admitted to rehab there and got discharged recently . .   chest pain started this morning and substernal and currently resolved .  Mild troponin elevation and admitted . Actually patient has appointment today with [facility] possible  interventionist but admitted here .  On exam , no CP , no leg pain or SOB . Deny abdominal pain and Hb is stable  .  Bilateral significant leg edema noted but patient said this is even better than before.       Hospital course  Patient was admitted with non STEMI.  She had complicated cardiac history as above and not a candidate for CABG.  Because of recurrent chest pain with elevated cardiac enzymes and later patient was transferred to [facility]  high risk PCI unit for intervention.  Actually patient had appointment with Dr. Chappell interventional cardiologist since yesterday.  Patient was chest pain-free at the time of the discharge /transfer.

## 2022-04-08 NOTE — CONSULTS
"[facility]  Department of Cardiology  Consult Note      PATIENT NAME: Marisol Kerr    MRN: [id number]  TODAY'S DATE: 04/08/2022  ADMIT DATE: 4/7/2022                          CONSULT REQUESTED BY: Elio Grant MD    SUBJECTIVE     PRINCIPAL PROBLEM: <principal problem not specified>      REASON FOR CONSULT:  Chest pain      HPI:  Ms Kerr is a 76 year old female with PMHx of DM2, HTN, HLD, CKD, COPD on home O2 who presents SNF following hospitalization for NSTEMI.  She was discharged from skilled nursing facility on April 1st.       Patient initially presented to [facility] for a left heart catheterization to evaluate recently increasing episodes of angina requiring more frequent Nitroglycerin use. She has history of iodine allergy and was pretreated with prednisone, but post- procedure she developed SOB, respiratory distress requiring overnight hospitalization for monitoring. She also had elevated blood pressures with systolics greater than 200s during hospitalization. Overnight, as she woke up to urinate she felt 10/10 chest pain all over that she described as "burning", and "veins being ripped apart". STAT EKG at the time showed concerns for  lateral precordial ST depressions. She was transferred to [facility] for further evaluation of her ACS and potential emergent intervention. LHC at outside hospital was notable for significant coraonary artery disease. Patient admitted to CCU while pending evaluation for ACS intervention, given her NSTEMI. Interventional Cardiology and Cardiothoracic Surgery consulted. Initiated guideline directed medical therapy for ACS.   CTS evaluation did not recommend patient for CABG due to her debility as she is prohibitively high risk for CABG. Patient had significant abdominal pain overnight and distended abdomen with mass. In light of this, IC deferred LHC. CT of the abdomen showed a large rectus sheath hematoma. Given patient's kidney function, CTA was deferred " temporarily but the abdominal mass continued to enlarge. CTA of the abdomen showed extravasation of contrast into the rectus sheath hematoma. IR consulted and performed embolization of the inferior epigastric and collateral arteries. Patient's Cr elevated to 1.9 likely due to contrast. Patient's Hgb dropped requiring transfusion with aim >10 for IC intervention. Patient was given 2 pRBC and Hgb responded up to 9.2. IC recommended that since patient had a recent bleed, would defer procedure for outpatient in 1-2 weeks.  During her stay in skilled nursing facility she did not have chest pain.  she has had cold-like symptoms running of nose and cough.  Yesterday she developed severe chest pain relieved with some lingual nitroglycerin.  During the night she had recurrence symptoms.  Her troponins are mildly elevated.  She was supposed to have an appointment with Dr. Chappell yesterday to discuss the possibility of intervention.           Review of patient's allergies indicates:   Allergen Reactions    Iodinated contrast media     Strawberries [strawberry] Hives and Itching       Past Medical History:   Diagnosis Date    CAD (coronary artery disease)     Cancer     colon    CHF (congestive heart failure)     Colitis     Colon cancer     COPD (chronic obstructive pulmonary disease)     Decreased hearing     Diabetes mellitus     Diabetes mellitus, type 2     Hypercholesterolemia     Hypertension     Hypoxemia     Insomnia     Obesity     Osteoarthritis      Past Surgical History:   Procedure Laterality Date    ANGIOGRAM, CORONARY, WITH LEFT HEART CATHETERIZATION N/A 2/10/2022    Procedure: Angiogram, Coronary, with Left Heart Cath;  Surgeon: Cristian Benjamin MD;  Location: [facility] CATH/EP LAB;  Service: Cardiology;  Laterality: N/A;    CARDIAC CATHETERIZATION      CHOLECYSTECTOMY      COLECTOMY      CORONARY ANGIOPLASTY      CORONARY STENT PLACEMENT      EXTERNAL EAR SURGERY      EYE SURGERY       FLEXIBLE SIGMOIDOSCOPY N/A 2019    Procedure: SIGMOIDOSCOPY, FLEXIBLE;  Surgeon: Biju Kramer III, MD;  Location: [facility];  Service: Endoscopy;  Laterality: N/A;    HYSTERECTOMY      partial     Social History     Tobacco Use    Smoking status: Former Smoker     Packs/day: 3.00     Years: 50.00     Pack years: 150.00     Types: Cigarettes     Start date: 3/30/1964     Quit date: 2006     Years since quittin.1    Smokeless tobacco: Never Used    Tobacco comment: ex smoker 15 years   Substance Use Topics    Alcohol use: Yes    Drug use: No        REVIEW OF SYSTEMS  CONSTITUTIONAL: Negative for chills and fever she has been tired and fatigued.  She has been having runny nose and cough  EYES: No double vision, No blurred vision  NEURO: No headaches, No dizziness  RESPIRATORY: + cough, shortness of breath and wheezing.    CARDIOVASCULAR: Negative for chest pain. Negative for palpitations and leg swelling.   GI: Negative for abdominal pain, No melena, diarrhea, nausea and vomiting.   : Negative for dysuria and frequency, Negative for hematuria  SKIN:  Positive for bruising       OBJECTIVE     VITAL SIGNS (Most Recent)  Temp: 97.6 °F (36.4 °C) (22)  Pulse: 100 (22)  Resp: 20 (22)  BP: 137/64 (22)  SpO2: 97 % (22)    VENTILATION STATUS  Resp: 20 (22)  SpO2: 97 % (22)       I & O (Last 24H):    Intake/Output Summary (Last 24 hours) at 2022 0900  Last data filed at 2022 0420  Gross per 24 hour   Intake --   Output 900 ml   Net -900 ml       WEIGHTS  Wt Readings from Last 3 Encounters:   22 0418 80 kg (176 lb 5.9 oz)   22 1845 83.5 kg (184 lb)   22 1203 83.5 kg (184 lb)   22 0604 78.7 kg (173 lb 8 oz)   22 0600 79.2 kg (174 lb 9.7 oz)   22 0528 80.4 kg (177 lb 4 oz)   22 06 79.1 kg (174 lb 6.1 oz)   22 80.3 kg (177 lb 0.5 oz)   22 83.5 kg (184 lb  1.4 oz)   03/18/22 1648 83 kg (182 lb 15.7 oz)   03/17/22 0753 87.2 kg (192 lb 3.9 oz)   03/16/22 0435 (!) 187.4 kg (413 lb 2.3 oz)       PHYSICAL EXAM  GENERAL:  Overweight anxious female   HEENT: Normocephalic. Pupils normal and conjunctivae normal.  Mucous membranes normal, no cyanosis or icterus, trachea central,no pallor or icterus is noted..   NECK: No JVD. No bruit..   CARDIAC: Regular rate and rhythm. S1 is normal.S2 is normal.No gallops, clicks or murmurs noted at this time.  CHEST ANATOMY: normal.   LUNGS: Clear to auscultation. No wheezing or rhonchi..   ABDOMEN: Soft no masses or organomegaly.  No abdomen pulsations or bruits.  Normal bowel sounds. No pulsations and no masses felt, No guarding or rebound.   URINARY: No daniel catheter   EXTREMITIES: No cyanosis, clubbing or edema noted at this time., no calf tenderness bilaterally.     HOME MEDICATIONS:  No current facility-administered medications on file prior to encounter.     Current Outpatient Medications on File Prior to Encounter   Medication Sig Dispense Refill    acetaminophen (TYLENOL) 325 MG tablet Take 2 tablets (650 mg total) by mouth every 4 (four) hours as needed.  0    albuterol (VENTOLIN HFA) 90 mcg/actuation inhaler Inhale 2 puffs into the lungs every 6 (six) hours as needed for Wheezing or Shortness of Breath. Rescue 36 g 3    amLODIPine (NORVASC) 10 MG tablet Take 1 tablet (10 mg total) by mouth once daily. 30 tablet 11    aspirin (ECOTRIN) 81 MG EC tablet Take 81 mg by mouth every morning.       atorvastatin (LIPITOR) 80 MG tablet Take 80 mg by mouth once daily.      cholestyramine (QUESTRAN) 4 gram packet Take 1 packet (4 g total) by mouth 2 (two) times daily. 180 packet 3    coenzyme Q10 100 mg capsule Take 100 mg by mouth every morning.      ergocalciferol (VITAMIN D2) 50,000 unit Cap Take 1 capsule (50,000 Units total) by mouth every 7 days. 12 capsule 1    ferrous sulfate 325 (65 FE) MG EC tablet Take 1 tablet (325 mg  total) by mouth once daily.  0    fish oil-omega-3 fatty acids 300-1,000 mg capsule Take 1 capsule by mouth every morning.      fluticasone-salmeterol diskus inhaler 250-50 mcg Inhale 1 puff into the lungs 2 (two) times daily. 3 each 1    furosemide (LASIX) 20 MG tablet Take 2 tablets (40 mg total) by mouth once daily. 45 tablet 1    gabapentin (NEURONTIN) 100 MG capsule Take 1 capsule (100 mg total) by mouth every evening. 30 capsule 3    ipratropium-albuteroL (COMBIVENT RESPIMAT)  mcg/actuation inhaler Inhale 2 puffs into the lungs every 4 (four) hours as needed for Shortness of Breath. Rescue 12 g 3    isosorbide mononitrate (IMDUR) 30 MG 24 hr tablet Take 1 tablet (30 mg total) by mouth once daily. 30 tablet 3    lisinopriL 10 MG tablet Take 1 tablet (10 mg total) by mouth once daily. HOLD UNTIL OTHERWISE DIRECTED BY PRIMARY CARE PROVIDER 90 tablet 3    metoprolol succinate (TOPROL-XL) 50 MG 24 hr tablet Take 1 tablet (50 mg total) by mouth once daily. 45 tablet 1    NARCAN 4 mg/actuation Spry       nitroGLYCERIN 0.2 mg/hr TD PT24 (NITRODUR) 0.2 mg/hr APPLY 1 PATCH TOPICALLY ONCE DAILY TO LEG. 90 patch 1    nitroGLYCERIN 0.4 MG/DOSE TL SPRY (NITROLINGUAL) 400 mcg/spray spray USE ONE SPRAY UNDER THE TONGUE EVERY 5 MINUTES AS NEEDED FOR CHEST PAIN.  DO NOT EXCEED A TOTAL OF 3 SPRAYS IN 15 MINUTES 12 g 0    ondansetron (ZOFRAN-ODT) 4 MG TbDL Take 2 tablets (8 mg total) by mouth every 6 (six) hours as needed. 100 tablet 3    pantoprazole (PROTONIX) 40 MG tablet Take 1 tablet (40 mg total) by mouth once daily. 30 tablet 11    temazepam (RESTORIL) 15 mg Cap Take 15 mg by mouth nightly as needed.      ticagrelor (BRILINTA) 90 mg tablet Take 1 tablet (90 mg total) by mouth 2 (two) times a day. 60 tablet 3    triamcinolone acetonide 0.1% (KENALOG) 0.1 % cream Apply topically 2 (two) times daily. For legs. 453 g 1    umeclidinium (INCRUSE ELLIPTA) 62.5 mcg/actuation inhalation capsule Inhale 62.5  mcg into the lungs every morning. Controller 30 each 11    [DISCONTINUED] benazepriL (LOTENSIN) 40 MG tablet Take 1 tablet (40 mg total) by mouth once daily. 90 tablet 1    [DISCONTINUED] lovastatin (MEVACOR) 40 MG tablet Take 1 tablet (40 mg total) by mouth every other day. 45 tablet 3       SCHEDULED MEDS:   amLODIPine  10 mg Oral Daily    aspirin  81 mg Oral QAM    atorvastatin  80 mg Oral Daily    cholestyramine-aspartame  4 g Oral BID    fluticasone furoate-vilanteroL  1 puff Inhalation Daily    furosemide  40 mg Oral Daily    gabapentin  100 mg Oral QHS    isosorbide mononitrate  60 mg Oral Daily    lisinopriL  10 mg Oral Daily    metoprolol succinate  50 mg Oral Daily    ranolazine  500 mg Oral BID    sodium chloride 0.9%  10 mL Intravenous Q12H    ticagrelor  90 mg Oral BID    umeclidinium  62.5 mcg Inhalation QAM       CONTINUOUS INFUSIONS:    PRN MEDS:albuterol, albuterol-ipratropium, hydrALAZINE, nitroGLYCERIN    LABS AND DIAGNOSTICS     CBC LAST 3 DAYS  Recent Labs   Lab 04/07/22  1215 04/08/22  0414   WBC 8.33 8.20   RBC 3.37* 3.43*   HGB 9.7* 9.8*   HCT 31.4* 32.9*   MCV 93 96   MCH 28.8 28.6   MCHC 30.9* 29.8*   RDW 14.0 13.6    148*   MPV 11.3 11.6   GRAN 82.8*  6.9 77.3*  6.4   LYMPH 9.4*  0.8* 13.3*  1.1   MONO 5.8  0.5 6.6  0.5   BASO 0.03 0.03   NRBC 0 0       COAGULATION LAST 3 DAYS  Recent Labs   Lab 04/07/22  1215   LABPT 13.3   INR 1.1       CHEMISTRY LAST 3 DAYS  Recent Labs   Lab 04/07/22  1215 04/08/22  0414    144   K 4.9 4.5    109   CO2 27 24   ANIONGAP 7* 11   BUN 33* 28*   CREATININE 1.3 1.3   * 88   CALCIUM 8.9 9.2   ALBUMIN 3.3*  --    PROT 6.3  --    ALKPHOS 73  --    ALT 16  --    AST 17  --    BILITOT 0.7  --        CARDIAC PROFILE LAST 3 DAYS  Recent Labs   Lab 04/07/22  1215 04/07/22  1854   *  --    TROPONINI 0.058* 0.066*       ENDOCRINE LAST 3 DAYS  No results for input(s): TSH, PROCAL in the last 168 hours.    LAST  ARTERIAL BLOOD GAS  ABG  No results for input(s): PH, PO2, PCO2, HCO3, BE in the last 168 hours.    LAST 7 DAYS MICROBIOLOGY   Microbiology Results (last 7 days)     ** No results found for the last 168 hours. **          MOST RECENT IMAGING  X-Ray Chest AP Portable  CLINICAL HISTORY:  76 years (1945) Female Chest pain Chest Pain (Midsternal chest pain,; Hx of COPD, EMPHYSEMA    TECHNIQUE:  Portable AP radiograph the chest.    COMPARISON:  Most recent radiograph from February 10, 2022    FINDINGS:  The lungs are clear. Costophrenic angles are seen without effusion. No pneumothorax is identified. The heart is top normal in size.  Osseous structures show degenerative disc disease and degenerative changes in the shoulders. The visualized upper abdomen is unremarkable.    IMPRESSION:  No acute cardiac or pulmonary process.    .    Electronically signed by:  García Russell MD  4/7/2022 12:44 PM CDT Workstation: [id number]      ECHOCARDIOGRAM RESULTS (last 5)  Results for orders placed during the hospital encounter of 02/11/22    Echo    Interpretation Summary  · The left ventricle is normal in size with normal systolic function.  · The estimated ejection fraction is 65%.  · There is a minor septal wall motion abnormality.  · Severe left atrial enlargement.  · Grade II left ventricular diastolic dysfunction.  · Normal right ventricular size with normal right ventricular systolic function.  · Normal central venous pressure (3 mmHg).      Results for orders placed during the hospital encounter of 02/10/22    Echo    Interpretation Summary  · The estimated PA systolic pressure is 37 mmHg.  · The left ventricle is normal in size with normal systolic function.  · The estimated ejection fraction is 60%.  · Grade II left ventricular diastolic dysfunction.  · Normal right ventricular size with normal right ventricular systolic function.  · Severe left atrial enlargement.  · Moderate right atrial enlargement.  · Mild mitral  regurgitation.  · Mild tricuspid regurgitation.  · Normal central venous pressure (3 mmHg).      Results for orders placed during the hospital encounter of 09/16/19    Echo Color Flow Doppler? Yes    Interpretation Summary  · Concentric left ventricular hypertrophy.  · Normal left ventricular systolic function. The estimated ejection fraction is 65%  · Normal LV diastolic function.  · Normal right ventricular systolic function.  · Calculated RVSP is 28mmhg.      CURRENT/PREVIOUS VISIT EKG  Results for orders placed or performed during the hospital encounter of 04/07/22   EKG 12-lead    Collection Time: 04/07/22 11:51 AM    Narrative    Test Reason : R07.9,    Vent. Rate : 076 BPM     Atrial Rate : 076 BPM     P-R Int : 168 ms          QRS Dur : 098 ms      QT Int : 360 ms       P-R-T Axes : 066 061 029 degrees     QTc Int : 405 ms    Sinus rhythm with marked sinus arrythmia  Otherwise normal ECG  When compared with ECG of 16-MAR-2022 00:02,  Vent. rate has increased BY  25 BPM    Referred By: [email or web address]   SELF           Confirmed By:            ASSESSMENT/PLAN:     Active Hospital Problems    Diagnosis    NSTEMI (non-ST elevated myocardial infarction)    Chronic diastolic congestive heart failure    History of colon cancer    Class 2 obesity in adult    CKD (chronic kidney disease), stage III    Diabetic peripheral vascular disease    DM type 2, controlled, with complication    Empyema lung    Hypertension    Coronary artery disease, multivessel with history of previous PCI    Hypercholesterolemia    Essential hypertension, benign    Coronary atherosclerosis       ASSESSMENT & PLAN:   Severe coronary artery  Her cardiac markers are mildly elevated.  Discussed with Dr. Chappell she is to be transferred to [facility] for high risk intervention.    Iodine allergy    Severe COPD O2 dependent    Obesity    Chronic kidney disease    Chronic anemia    Diabetes mellitus    Recent hematoma of the abdominal  rectus muscle          RECOMMENDATIONS:  Transferred to [facility] for high risk intervention        Cristian Benjamin MD  [facility]  Department of Cardiology  Date of Service: 04/08/2022  9:00 AM

## 2022-04-08 NOTE — PLAN OF CARE
Medicare Outpatient Observation Notice was signed, explained and given to patient/caregiver on 04/08/2022 at 9:48am     addressed any questions or concerns.    Medicare Outpatient Observation Notice will be scanned into patient's medical record

## 2022-04-08 NOTE — CARE UPDATE
04/07/22 2116   Patient Assessment/Suction   Respiratory Effort Unlabored   All Lung Fields Breath Sounds clear   Rhythm/Pattern, Respiratory pattern regular   PRE-TX-O2   O2 Device (Oxygen Therapy) nasal cannula   $ Is the patient on Low Flow Oxygen? Yes   Flow (L/min) 2   SpO2 97 %   Pulse 63   Resp 16   Aerosol Therapy   $ Aerosol Therapy Charges PRN treatment not required   Respiratory Evaluation   $ Care Plan Tech Time 15 min   $ Eval/Re-eval Charges Evaluation   Evaluation For New Orders

## 2022-04-08 NOTE — DISCHARGE SUMMARY
[facility] Medicine  Discharge Summary      Patient Name: Marisol Kerr  MRN: [id number]  Patient Class: OP- Observation  Admission Date: 4/7/2022  Hospital Length of Stay: 0 days  Discharge Date and Time:  04/08/2022 3:23 PM  Attending Physician: Elio Grant MD   Discharging Provider: Elio Grant MD  Primary Care Provider: Khadar Dwyer MD      HPI:       76 year old female with PMHx of DM2, HTN, HLD, CKD, COPD on home O2  recent STEMI s/p LHC who is not amenable for CABG came with CP .  She had left heart catheterization  here and had complication with rectus sheet hematoma/bleeding and transferred to [facility] for IR intervention and embolization was done recently and admitted to rehab there and got discharged recently . .   chest pain started this morning and substernal and currently resolved .  Mild troponin elevation and admitted . Actually patient has appointment today with [facility] possible  interventionist but admitted here .  On exam , no CP , no leg pain or SOB . Deny abdominal pain and Hb is stable  .  Bilateral significant leg edema noted but patient said this is even better than before.      * No surgery found *      Hospital Course:      HPI:     76 year old female with PMHx of DM2, HTN, HLD, CKD, COPD on home O2  recent STEMI and was transferred to [facility] for high risk PCI . She had done  s/p LHC here  and she is deem  not amenable for CABG, came with CP .   at [facility] , found to have  rectus sheet hematoma/bleeding and  IR intervention and embolization was done recently and admitted to rehab there and got discharged recently . .   chest pain started this morning and substernal and currently resolved .  Mild troponin elevation and admitted . Actually patient has appointment today with [facility] possible  interventionist but admitted here .  On exam , no CP , no leg pain or SOB . Deny abdominal pain and Hb is stable  .  Bilateral significant leg edema noted but patient said this is even  better than before.       Hospital course  Patient was admitted with non STEMI.  She had complicated cardiac history as above and not a candidate for CABG.  Because of recurrent chest pain with elevated cardiac enzymes and later patient was transferred to [facility]  high risk PCI unit for intervention.  Actually patient had appointment with Dr. Chappell interventional cardiologist since yesterday.  Patient was chest pain-free at the time of the discharge /transfer.         Goals of Care Treatment Preferences:  Code Status: Full Code      Consults:   Consults (From admission, onward)        Status Ordering Provider     Inpatient consult to Registered Dietitian/Nutritionist  Once        Provider:  (Not yet assigned)    Completed RICHARD GRANT     Inpatient consult to Cardiology  Once        Provider:  Khadar Burt MD    Acknowledged RICHARD GRANT     Inpatient consult to Internal Medicine  Once        Provider:  Richard Grant MD    Acknowledged DESTIN OLEARY          No new Assessment & Plan notes have been filed under this hospital service since the last note was generated.  Service: Hospital Medicine    Final Active Diagnoses:    Diagnosis Date Noted POA    PRINCIPAL PROBLEM:  Coronary atherosclerosis [I25.10] 04/09/2013 Yes    NSTEMI (non-ST elevated myocardial infarction) [I21.4] 02/11/2022 Yes    Chronic diastolic congestive heart failure [I50.32] 09/17/2019 Yes    History of colon cancer [Z85.038] 10/27/2016 Yes    Class 2 obesity in adult [E66.9] 05/18/2015 Yes    CKD (chronic kidney disease), stage III [N18.30] 10/08/2014 Yes    Diabetic peripheral vascular disease [E11.51] 06/26/2014 Yes    DM type 2, controlled, with complication [E11.8] 06/03/2013 Yes    Empyema lung [J86.9] 06/03/2013 Yes    Hypertension [I10] 06/03/2013 Yes    Coronary artery disease, multivessel with history of previous PCI [I25.10] 06/03/2013 Yes    Hypercholesterolemia [E78.00] 04/09/2013 Yes    Essential  hypertension, benign [I10] 04/09/2013 Yes      Problems Resolved During this Admission:       Discharged Condition: good    Disposition: Another Health Care Inst*    Follow Up:   Follow-up Information     Scott Chappell MD Follow up.    Specialty: Interventional Cardiology  Contact information:  [address]  [place] [address] [phone]                       Patient Instructions:      Diet Cardiac     Activity as tolerated       Significant Diagnostic Studies: Labs:   CMP   Recent Labs   Lab 04/07/22  1215 04/08/22  0414    144   K 4.9 4.5    109   CO2 27 24   * 88   BUN 33* 28*   CREATININE 1.3 1.3   CALCIUM 8.9 9.2   PROT 6.3  --    ALBUMIN 3.3*  --    BILITOT 0.7  --    ALKPHOS 73  --    AST 17  --    ALT 16  --    ANIONGAP 7* 11   ESTGFRAFRICA 46.7* 46.7*   EGFRNONAA 40.5* 40.5*    and CBC   Recent Labs   Lab 04/07/22  1215 04/08/22  0414   WBC 8.33 8.20   HGB 9.7* 9.8*   HCT 31.4* 32.9*    148*       Pending Diagnostic Studies:     Procedure Component Value Units Date/Time    EKG 12-lead [[id number]] Collected: 04/08/22 0523    Order Status: Sent Lab Status: In process Updated: 04/08/22 1233    Narrative:      Test Reason : R07.9,    Vent. Rate : 117 BPM     Atrial Rate : 117 BPM     P-R Int : 118 ms          QRS Dur : 088 ms      QT Int : 320 ms       P-R-T Axes : 044 051 191 degrees     QTc Int : 446 ms    Sinus tachycardia  ST and T wave abnormality, consider inferolateral ischemia  Abnormal ECG  When compared with ECG of 07-APR-2022 11:51,  Vent. rate has increased BY  41 BPM  ST now depressed in Anterior-lateral leads  T wave inversion now evident in Inferior leads  T wave inversion now evident in Lateral leads    Referred By: [email or web address]   SELF           Confirmed By:          Medications:  Reconciled Home Medications:      Medication List      CHANGE how you take these medications    atorvastatin 80 MG tablet  Commonly known as: LIPITOR  Take 80 mg by mouth once  daily.  What changed: Another medication with the same name was removed. Continue taking this medication, and follow the directions you see here.        CONTINUE taking these medications    acetaminophen 325 MG tablet  Commonly known as: TYLENOL  Take 2 tablets (650 mg total) by mouth every 4 (four) hours as needed.     albuterol 90 mcg/actuation inhaler  Commonly known as: VENTOLIN HFA  Inhale 2 puffs into the lungs every 6 (six) hours as needed for Wheezing or Shortness of Breath. Rescue     amLODIPine 10 MG tablet  Commonly known as: NORVASC  Take 1 tablet (10 mg total) by mouth once daily.     aspirin 81 MG EC tablet  Commonly known as: ECOTRIN  Take 81 mg by mouth every morning.     cholestyramine 4 gram packet  Commonly known as: QUESTRAN  Take 1 packet (4 g total) by mouth 2 (two) times daily.     coenzyme Q10 100 mg capsule  Take 100 mg by mouth every morning.     CombiVENT RESPIMAT  mcg/actuation inhaler  Generic drug: ipratropium-albuteroL  Inhale 2 puffs into the lungs every 4 (four) hours as needed for Shortness of Breath. Rescue     ergocalciferol 50,000 unit Cap  Commonly known as: VITAMIN D2  Take 1 capsule (50,000 Units total) by mouth every 7 days.     ferrous sulfate 325 (65 FE) MG EC tablet  Take 1 tablet (325 mg total) by mouth once daily.     fish oil-omega-3 fatty acids 300-1,000 mg capsule  Take 1 capsule by mouth every morning.     fluticasone-salmeterol 250-50 mcg/dose 250-50 mcg/dose diskus inhaler  Commonly known as: ADVAIR DISKUS  Inhale 1 puff into the lungs 2 (two) times daily.     furosemide 20 MG tablet  Commonly known as: LASIX  Take 2 tablets (40 mg total) by mouth once daily.     gabapentin 100 MG capsule  Commonly known as: NEURONTIN  Take 1 capsule (100 mg total) by mouth every evening.     INCRUSE ELLIPTA 62.5 mcg/actuation inhalation capsule  Generic drug: umeclidinium  Inhale 62.5 mcg into the lungs every morning. Controller     isosorbide mononitrate 30 MG 24 hr  tablet  Commonly known as: IMDUR  Take 1 tablet (30 mg total) by mouth once daily.     lisinopriL 10 MG tablet  Take 1 tablet (10 mg total) by mouth once daily. HOLD UNTIL OTHERWISE DIRECTED BY PRIMARY CARE PROVIDER     metoprolol succinate 50 MG 24 hr tablet  Commonly known as: TOPROL-XL  Take 1 tablet (50 mg total) by mouth once daily.     NARCAN 4 mg/actuation Spry  Generic drug: naloxone     * nitroGLYCERIN 0.2 mg/hr TD PT24 0.2 mg/hr  Commonly known as: NITRODUR  APPLY 1 PATCH TOPICALLY ONCE DAILY TO LEG.     * nitroGLYCERIN 0.4 MG/DOSE TL SPRY 400 mcg/spray spray  Commonly known as: NITROLINGUAL  USE ONE SPRAY UNDER THE TONGUE EVERY 5 MINUTES AS NEEDED FOR CHEST PAIN.  DO NOT EXCEED A TOTAL OF 3 SPRAYS IN 15 MINUTES     ondansetron 4 MG Tbdl  Commonly known as: ZOFRAN-ODT  Take 2 tablets (8 mg total) by mouth every 6 (six) hours as needed.     pantoprazole 40 MG tablet  Commonly known as: PROTONIX  Take 1 tablet (40 mg total) by mouth once daily.     temazepam 15 mg Cap  Commonly known as: RESTORIL  Take 15 mg by mouth nightly as needed.     triamcinolone acetonide 0.1% 0.1 % cream  Commonly known as: KENALOG  Apply topically 2 (two) times daily. For legs.         * This list has 2 medication(s) that are the same as other medications prescribed for you. Read the directions carefully, and ask your doctor or other care provider to review them with you.            STOP taking these medications    benazepriL 40 MG tablet  Commonly known as: LOTENSIN     lovastatin 40 MG tablet  Commonly known as: MEVACOR     ticagrelor 90 mg tablet  Commonly known as: BRILINTA            Indwelling Lines/Drains at time of discharge:   Lines/Drains/Airways     Drain  Duration           Female External Urinary Catheter 03/27/22 2000 11 days            General: Patient resting comfortably in no acute distress. Appears as stated age. Calm  Eyes: EOM intact. No conjunctivae injection. No scleral icterus.  ENT: Hearing grossly intact.  No discharge from ears. No nasal discharge.   CVS: RRR. No LE edema BL.  Lungs: CTA BL, no wheezing or crackles. Good breath sounds. No accessory muscle use. No acute respiratory distress  Neuro: non focal , Follows commands. Responds appropriately    Time spent on the discharge of patient: 23 minutes         Elio Grant MD  Department of Hospital Medicine  [facility]

## 2022-04-08 NOTE — NURSING
0615 pt became SOB and c/o CP 6/10. EKG performed ST & T wave abnormality. /70 (100)  O2 97% on 3L of oxygen. Nitro 0.4mg given x1. CP resolved,  MD notified. Pt is stable, Will pass information to on coming nurse

## 2022-04-08 NOTE — SUBJECTIVE & OBJECTIVE
Interval History:     Review of Systems  As per subjective   Objective:     Vital Signs (Most Recent):  Temp: 99.2 °F (37.3 °C) (04/08/22 1146)  Pulse: 67 (04/08/22 1146)  Resp: 20 (04/08/22 1146)  BP: (!) 138/59 (04/08/22 1146)  SpO2: 98 % (04/08/22 1146)   Vital Signs (24h Range):  Temp:  [97.6 °F (36.4 °C)-99.2 °F (37.3 °C)] 99.2 °F (37.3 °C)  Pulse:  [] 67  Resp:  [16-28] 20  SpO2:  [97 %-100 %] 98 %  BP: (137-197)/(59-90) 138/59     Weight: 80 kg (176 lb 5.9 oz)  Body mass index is 31.24 kg/m².    Intake/Output Summary (Last 24 hours) at 4/8/2022 1417  Last data filed at 4/8/2022 0940  Gross per 24 hour   Intake --   Output 1600 ml   Net -1600 ml      Physical Exam  Vitals and nursing note reviewed.   HENT:      Head: Normocephalic and atraumatic.      Nose: Nose normal.   Eyes:      Conjunctiva/sclera: Conjunctivae normal.   Neck:      Vascular: No JVD.   Cardiovascular:      Rate and Rhythm: Normal rate.      Heart sounds: Normal heart sounds.   Pulmonary:      Effort: Pulmonary effort is normal.   Abdominal:      Palpations: Abdomen is soft.   Musculoskeletal:         General: Normal range of motion.      Cervical back: Normal range of motion.   Skin:     General: Skin is warm.   Neurological:      Mental Status: She is alert and oriented to person, place, and time.       Significant Labs: All pertinent labs within the past 24 hours have been reviewed.  CMP:   Recent Labs   Lab 04/07/22  1215 04/08/22  0414    144   K 4.9 4.5    109   CO2 27 24   * 88   BUN 33* 28*   CREATININE 1.3 1.3   CALCIUM 8.9 9.2   PROT 6.3  --    ALBUMIN 3.3*  --    BILITOT 0.7  --    ALKPHOS 73  --    AST 17  --    ALT 16  --    ANIONGAP 7* 11   EGFRNONAA 40.5* 40.5*     Cardiac Markers:   Recent Labs   Lab 04/07/22  1215   *     Coagulation:   Recent Labs   Lab 04/07/22  1215   INR 1.1     Lactic Acid: No results for input(s): LACTATE in the last 48 hours.    Significant Imaging: I have reviewed all  pertinent imaging results/findings within the past 24 hours.

## 2022-04-08 NOTE — PLAN OF CARE
Critical access hospital  Initial Discharge Assessment       Primary Care Provider: Khadar Dwyer MD    Admission Diagnosis: Chest pain, unspecified type [R07.9]    Admission Date: 4/7/2022  Expected Discharge Date:     Discharge Barriers Identified: None     Assessment completed by health record. Patient being seen by medical team at time of assessment. CM follow to identify needs.    Payor: MEDICARE / Plan: MEDICARE PART A & B / Product Type: Government /     Extended Emergency Contact Information  Primary Emergency Contact: Marisol Kerr  Address: [address] [place] [address] [place]  Mobile Phone: [phone]  Relation: Daughter  Preferred language: English   needed? No    Discharge Plan A: Home, Home with family  Discharge Plan B: Home, Home with family      [address]  [address] [facility] [address]  Phone: [phone] Fax: [phone]      Initial Assessment (most recent)     Adult Discharge Assessment - 04/08/22 1059        Discharge Assessment    Assessment Type Discharge Planning Assessment     Confirmed/corrected address, phone number and insurance Yes     Confirmed Demographics Correct on Facesheet     Source of Information health record     If unable to respond/provide information was family/caregiver contacted? Yes     Contact Name/Number Marisol Kerr (daughter) [phone]     When was your last doctors appointment? --   February 2022    Lives With alone     Facility Arrived From: Home     Do you expect to return to your current living situation? Yes     Do you have help at home or someone to help you manage your care at home? Yes     Who are your caregiver(s) and their phone number(s)? Marisol Kerr (daughter) [phone]     Prior to hospitilization cognitive status: Alert/Oriented     Current cognitive status: Unable to Assess     Walking or Climbing Stairs Difficulty  ambulation difficulty, requires equipment;transferring difficulty, requires equipment;ambulation difficulty, dependent;stair climbing difficulty, dependent;transferring difficulty, dependent     Dressing/Bathing Difficulty bathing difficulty, requires equipment     Readmission within 30 days? Yes     Do you take prescription medications? Yes     Do you have prescription coverage? Yes     Coverage Medicare part a&b     Do you have any problems affording any of your prescribed medications? No     Is the patient taking medications as prescribed? yes     Who is going to help you get home at discharge? Marisol Kerr (daughter) [phone]     Are you on dialysis? No     Do you take coumadin? No     Discharge Plan A Home;Home with family     Discharge Plan B Home;Home with family     DME Needed Upon Discharge  none     Discharge Barriers Identified None

## 2022-04-08 NOTE — NURSING
Pt's daughter, Marisol Kerr, notified of transfer order and bed assignment @ [facility].  Verbilized understanding.  Awaiting transport.

## 2022-04-09 LAB
ABO + RH BLD: NORMAL
ALBUMIN SERPL BCP-MCNC: 2.7 G/DL (ref 3.5–5.2)
ALP SERPL-CCNC: 78 U/L (ref 55–135)
ALT SERPL W/O P-5'-P-CCNC: 8 U/L (ref 10–44)
ANION GAP SERPL CALC-SCNC: 8 MMOL/L (ref 8–16)
APTT BLDCRRT: 25 SEC (ref 21–32)
APTT BLDCRRT: 27.3 SEC (ref 21–32)
APTT BLDCRRT: 27.3 SEC (ref 21–32)
AST SERPL-CCNC: 12 U/L (ref 10–40)
BASOPHILS # BLD AUTO: 0.02 K/UL (ref 0–0.2)
BASOPHILS # BLD AUTO: 0.03 K/UL (ref 0–0.2)
BASOPHILS # BLD AUTO: 0.03 K/UL (ref 0–0.2)
BASOPHILS NFR BLD: 0.2 % (ref 0–1.9)
BASOPHILS NFR BLD: 0.4 % (ref 0–1.9)
BASOPHILS NFR BLD: 0.4 % (ref 0–1.9)
BILIRUB SERPL-MCNC: 0.5 MG/DL (ref 0.1–1)
BLD GP AB SCN CELLS X3 SERPL QL: NORMAL
BNP SERPL-MCNC: 238 PG/ML (ref 0–99)
BSA FOR ECHO PROCEDURE: 1.87 M2
BUN SERPL-MCNC: 33 MG/DL (ref 8–23)
CALCIUM SERPL-MCNC: 9.4 MG/DL (ref 8.7–10.5)
CHLORIDE SERPL-SCNC: 107 MMOL/L (ref 95–110)
CO2 SERPL-SCNC: 27 MMOL/L (ref 23–29)
CREAT SERPL-MCNC: 1.3 MG/DL (ref 0.5–1.4)
DIFFERENTIAL METHOD: ABNORMAL
EJECTION FRACTION: 60 %
EOSINOPHIL # BLD AUTO: 0.1 K/UL (ref 0–0.5)
EOSINOPHIL NFR BLD: 1.3 % (ref 0–8)
EOSINOPHIL NFR BLD: 1.8 % (ref 0–8)
EOSINOPHIL NFR BLD: 1.8 % (ref 0–8)
ERYTHROCYTE [DISTWIDTH] IN BLOOD BY AUTOMATED COUNT: 13.4 % (ref 11.5–14.5)
EST. GFR  (AFRICAN AMERICAN): 46.7 ML/MIN/1.73 M^2
EST. GFR  (NON AFRICAN AMERICAN): 40.5 ML/MIN/1.73 M^2
FRACTIONAL SHORTENING: 30 % (ref 28–44)
GLUCOSE SERPL-MCNC: 95 MG/DL (ref 70–110)
HCT VFR BLD AUTO: 30.5 % (ref 37–48.5)
HCT VFR BLD AUTO: 30.5 % (ref 37–48.5)
HCT VFR BLD AUTO: 33.5 % (ref 37–48.5)
HGB BLD-MCNC: 9.3 G/DL (ref 12–16)
HGB BLD-MCNC: 9.3 G/DL (ref 12–16)
HGB BLD-MCNC: 9.9 G/DL (ref 12–16)
IMM GRANULOCYTES # BLD AUTO: 0.02 K/UL (ref 0–0.04)
IMM GRANULOCYTES # BLD AUTO: 0.02 K/UL (ref 0–0.04)
IMM GRANULOCYTES # BLD AUTO: 0.03 K/UL (ref 0–0.04)
IMM GRANULOCYTES NFR BLD AUTO: 0.3 % (ref 0–0.5)
INR PPP: 1 (ref 0.8–1.2)
INR PPP: 1 (ref 0.8–1.2)
LEFT INTERNAL DIMENSION IN SYSTOLE: 2.36 CM (ref 2.1–4)
LEFT VENTRICLE DIASTOLIC VOLUME INDEX: 25.54 ML/M2
LEFT VENTRICLE DIASTOLIC VOLUME: 46.49 ML
LEFT VENTRICLE SYSTOLIC VOLUME INDEX: 10.6 ML/M2
LEFT VENTRICLE SYSTOLIC VOLUME: 19.23 ML
LEFT VENTRICULAR INTERNAL DIMENSION IN DIASTOLE: 3.37 CM (ref 3.5–6)
LYMPHOCYTES # BLD AUTO: 1.2 K/UL (ref 1–4.8)
LYMPHOCYTES NFR BLD: 11.4 % (ref 18–48)
LYMPHOCYTES NFR BLD: 15.9 % (ref 18–48)
LYMPHOCYTES NFR BLD: 15.9 % (ref 18–48)
MAGNESIUM SERPL-MCNC: 1.7 MG/DL (ref 1.6–2.6)
MCH RBC QN AUTO: 28.6 PG (ref 27–31)
MCH RBC QN AUTO: 29 PG (ref 27–31)
MCH RBC QN AUTO: 29 PG (ref 27–31)
MCHC RBC AUTO-ENTMCNC: 29.6 G/DL (ref 32–36)
MCHC RBC AUTO-ENTMCNC: 30.5 G/DL (ref 32–36)
MCHC RBC AUTO-ENTMCNC: 30.5 G/DL (ref 32–36)
MCV RBC AUTO: 95 FL (ref 82–98)
MCV RBC AUTO: 95 FL (ref 82–98)
MCV RBC AUTO: 97 FL (ref 82–98)
MONOCYTES # BLD AUTO: 0.5 K/UL (ref 0.3–1)
MONOCYTES # BLD AUTO: 0.5 K/UL (ref 0.3–1)
MONOCYTES # BLD AUTO: 0.7 K/UL (ref 0.3–1)
MONOCYTES NFR BLD: 6.5 % (ref 4–15)
MONOCYTES NFR BLD: 6.8 % (ref 4–15)
MONOCYTES NFR BLD: 6.8 % (ref 4–15)
NEUTROPHILS # BLD AUTO: 5.8 K/UL (ref 1.8–7.7)
NEUTROPHILS # BLD AUTO: 5.8 K/UL (ref 1.8–7.7)
NEUTROPHILS # BLD AUTO: 8.2 K/UL (ref 1.8–7.7)
NEUTROPHILS NFR BLD: 74.8 % (ref 38–73)
NEUTROPHILS NFR BLD: 74.8 % (ref 38–73)
NEUTROPHILS NFR BLD: 80.3 % (ref 38–73)
NRBC BLD-RTO: 0 /100 WBC
PLATELET # BLD AUTO: 166 K/UL (ref 150–450)
PLATELET # BLD AUTO: 166 K/UL (ref 150–450)
PLATELET # BLD AUTO: 174 K/UL (ref 150–450)
PMV BLD AUTO: 11.6 FL (ref 9.2–12.9)
PMV BLD AUTO: 11.9 FL (ref 9.2–12.9)
PMV BLD AUTO: 11.9 FL (ref 9.2–12.9)
POCT GLUCOSE: 101 MG/DL (ref 70–110)
POCT GLUCOSE: 113 MG/DL (ref 70–110)
POCT GLUCOSE: 127 MG/DL (ref 70–110)
POCT GLUCOSE: 99 MG/DL (ref 70–110)
POTASSIUM SERPL-SCNC: 4.7 MMOL/L (ref 3.5–5.1)
PROT SERPL-MCNC: 5.5 G/DL (ref 6–8.4)
PROTHROMBIN TIME: 10.1 SEC (ref 9–12.5)
PROTHROMBIN TIME: 10.2 SEC (ref 9–12.5)
RBC # BLD AUTO: 3.21 M/UL (ref 4–5.4)
RBC # BLD AUTO: 3.21 M/UL (ref 4–5.4)
RBC # BLD AUTO: 3.46 M/UL (ref 4–5.4)
SODIUM SERPL-SCNC: 142 MMOL/L (ref 136–145)
TROPONIN I SERPL DL<=0.01 NG/ML-MCNC: 0.06 NG/ML (ref 0–0.03)
TROPONIN I SERPL DL<=0.01 NG/ML-MCNC: 0.07 NG/ML (ref 0–0.03)
WBC # BLD AUTO: 10.21 K/UL (ref 3.9–12.7)
WBC # BLD AUTO: 7.69 K/UL (ref 3.9–12.7)
WBC # BLD AUTO: 7.69 K/UL (ref 3.9–12.7)

## 2022-04-09 PROCEDURE — 85610 PROTHROMBIN TIME: CPT | Performed by: INTERNAL MEDICINE

## 2022-04-09 PROCEDURE — 94640 AIRWAY INHALATION TREATMENT: CPT

## 2022-04-09 PROCEDURE — 85730 THROMBOPLASTIN TIME PARTIAL: CPT | Performed by: INTERNAL MEDICINE

## 2022-04-09 PROCEDURE — 25000003 PHARM REV CODE 250: Performed by: STUDENT IN AN ORGANIZED HEALTH CARE EDUCATION/TRAINING PROGRAM

## 2022-04-09 PROCEDURE — 25000003 PHARM REV CODE 250: Performed by: INTERNAL MEDICINE

## 2022-04-09 PROCEDURE — 20600001 HC STEP DOWN PRIVATE ROOM

## 2022-04-09 PROCEDURE — 83735 ASSAY OF MAGNESIUM: CPT | Performed by: INTERNAL MEDICINE

## 2022-04-09 PROCEDURE — 36415 COLL VENOUS BLD VENIPUNCTURE: CPT | Performed by: INTERNAL MEDICINE

## 2022-04-09 PROCEDURE — 99900035 HC TECH TIME PER 15 MIN (STAT)

## 2022-04-09 PROCEDURE — 83880 ASSAY OF NATRIURETIC PEPTIDE: CPT | Performed by: INTERNAL MEDICINE

## 2022-04-09 PROCEDURE — 27000221 HC OXYGEN, UP TO 24 HOURS

## 2022-04-09 PROCEDURE — 80053 COMPREHEN METABOLIC PANEL: CPT | Performed by: INTERNAL MEDICINE

## 2022-04-09 PROCEDURE — 25000242 PHARM REV CODE 250 ALT 637 W/ HCPCS: Performed by: INTERNAL MEDICINE

## 2022-04-09 PROCEDURE — 94761 N-INVAS EAR/PLS OXIMETRY MLT: CPT

## 2022-04-09 PROCEDURE — 85025 COMPLETE CBC W/AUTO DIFF WBC: CPT | Performed by: INTERNAL MEDICINE

## 2022-04-09 PROCEDURE — 99223 1ST HOSP IP/OBS HIGH 75: CPT | Mod: AI,GC,, | Performed by: INTERNAL MEDICINE

## 2022-04-09 PROCEDURE — 25000003 PHARM REV CODE 250

## 2022-04-09 PROCEDURE — 84484 ASSAY OF TROPONIN QUANT: CPT | Performed by: INTERNAL MEDICINE

## 2022-04-09 PROCEDURE — 99223 PR INITIAL HOSPITAL CARE,LEVL III: ICD-10-PCS | Mod: AI,GC,, | Performed by: INTERNAL MEDICINE

## 2022-04-09 RX ORDER — BENZONATATE 100 MG/1
100 CAPSULE ORAL 3 TIMES DAILY PRN
Status: DISCONTINUED | OUTPATIENT
Start: 2022-04-09 | End: 2022-04-22 | Stop reason: HOSPADM

## 2022-04-09 RX ORDER — IBUPROFEN 200 MG
24 TABLET ORAL
Status: DISCONTINUED | OUTPATIENT
Start: 2022-04-09 | End: 2022-04-22 | Stop reason: HOSPADM

## 2022-04-09 RX ORDER — GLUCAGON 1 MG
1 KIT INJECTION
Status: DISCONTINUED | OUTPATIENT
Start: 2022-04-09 | End: 2022-04-22 | Stop reason: HOSPADM

## 2022-04-09 RX ORDER — IBUPROFEN 200 MG
16 TABLET ORAL
Status: DISCONTINUED | OUTPATIENT
Start: 2022-04-09 | End: 2022-04-22 | Stop reason: HOSPADM

## 2022-04-09 RX ORDER — LANOLIN ALCOHOL/MO/W.PET/CERES
800 CREAM (GRAM) TOPICAL EVERY 4 HOURS
Status: COMPLETED | OUTPATIENT
Start: 2022-04-09 | End: 2022-04-09

## 2022-04-09 RX ORDER — INSULIN ASPART 100 [IU]/ML
0-5 INJECTION, SOLUTION INTRAVENOUS; SUBCUTANEOUS
Status: DISCONTINUED | OUTPATIENT
Start: 2022-04-09 | End: 2022-04-22 | Stop reason: HOSPADM

## 2022-04-09 RX ADMIN — TIOTROPIUM BROMIDE INHALATION SPRAY 2 PUFF: 3.12 SPRAY, METERED RESPIRATORY (INHALATION) at 09:04

## 2022-04-09 RX ADMIN — ATORVASTATIN CALCIUM 80 MG: 20 TABLET, FILM COATED ORAL at 08:04

## 2022-04-09 RX ADMIN — TICAGRELOR 90 MG: 90 TABLET ORAL at 09:04

## 2022-04-09 RX ADMIN — CHOLESTYRAMINE 4 G: 4 POWDER, FOR SUSPENSION ORAL at 09:04

## 2022-04-09 RX ADMIN — ISOSORBIDE MONONITRATE 30 MG: 30 TABLET, EXTENDED RELEASE ORAL at 08:04

## 2022-04-09 RX ADMIN — FLUTICASONE FUROATE AND VILANTEROL TRIFENATATE 1 PUFF: 100; 25 POWDER RESPIRATORY (INHALATION) at 09:04

## 2022-04-09 RX ADMIN — TRIAMCINOLONE ACETONIDE: 1 CREAM TOPICAL at 08:04

## 2022-04-09 RX ADMIN — Medication 800 MG: at 01:04

## 2022-04-09 RX ADMIN — METOPROLOL SUCCINATE 50 MG: 50 TABLET, EXTENDED RELEASE ORAL at 08:04

## 2022-04-09 RX ADMIN — PANTOPRAZOLE SODIUM 40 MG: 40 TABLET, DELAYED RELEASE ORAL at 08:04

## 2022-04-09 RX ADMIN — TRIAMCINOLONE ACETONIDE: 1 CREAM TOPICAL at 09:04

## 2022-04-09 RX ADMIN — CHOLESTYRAMINE 4 G: 4 POWDER, FOR SUSPENSION ORAL at 08:04

## 2022-04-09 RX ADMIN — ERGOCALCIFEROL 50000 UNITS: 1.25 CAPSULE ORAL at 08:04

## 2022-04-09 RX ADMIN — Medication 100 MG: at 07:04

## 2022-04-09 RX ADMIN — LISINOPRIL 10 MG: 10 TABLET ORAL at 08:04

## 2022-04-09 RX ADMIN — GABAPENTIN 100 MG: 100 CAPSULE ORAL at 09:04

## 2022-04-09 RX ADMIN — BENZONATATE 100 MG: 100 CAPSULE, LIQUID FILLED ORAL at 09:04

## 2022-04-09 RX ADMIN — ASPIRIN 81 MG: 81 TABLET, COATED ORAL at 07:04

## 2022-04-09 RX ADMIN — FERROUS SULFATE TAB 325 MG (65 MG ELEMENTAL FE) 1 EACH: 325 (65 FE) TAB at 08:04

## 2022-04-09 RX ADMIN — Medication 800 MG: at 10:04

## 2022-04-09 RX ADMIN — AMLODIPINE BESYLATE 10 MG: 10 TABLET ORAL at 08:04

## 2022-04-09 RX ADMIN — TICAGRELOR 90 MG: 90 TABLET ORAL at 08:04

## 2022-04-09 RX ADMIN — FUROSEMIDE 40 MG: 40 TABLET ORAL at 08:04

## 2022-04-09 NOTE — NURSING
Patient arrived to unit. Bed 322, Patient is alert x 4. No complaints of pain. Denies any N.V.D No complaints of chest pain Does report some SOB. Currently on 3 L o2. PIV left. No skin issue noted. Skin checked with charge nurse. Wound care consult placed for sacral wound.  Vital signs  /64 (BP Location: Right arm, Patient Position: Lying)   Pulse 85   Temp 99.3 °F (37.4 °C) (Axillary)   Resp 20   SpO2 97%

## 2022-04-09 NOTE — ASSESSMENT & PLAN NOTE
Normal EF 2/2022 and no s/s of exacerbation.    - Continue home furosemide  - Limited TTE in the morning  - Daily weights, strict I/Os  - Not on MRA or SLGT2; can consider as outpt

## 2022-04-09 NOTE — SUBJECTIVE & OBJECTIVE
Past Medical History:   Diagnosis Date    CAD (coronary artery disease)     Cancer     colon    CHF (congestive heart failure)     Colitis     Colon cancer     COPD (chronic obstructive pulmonary disease)     Decreased hearing     Diabetes mellitus     Diabetes mellitus, type 2     Hypercholesterolemia     Hypertension     Hypoxemia     Insomnia     Obesity     Osteoarthritis        Past Surgical History:   Procedure Laterality Date    ANGIOGRAM, CORONARY, WITH LEFT HEART CATHETERIZATION N/A 2/10/2022    Procedure: Angiogram, Coronary, with Left Heart Cath;  Surgeon: Cristian Benjamin MD;  Location: [facility] CATH/EP LAB;  Service: Cardiology;  Laterality: N/A;    CARDIAC CATHETERIZATION      CHOLECYSTECTOMY      COLECTOMY      CORONARY ANGIOPLASTY      CORONARY STENT PLACEMENT      EXTERNAL EAR SURGERY      EYE SURGERY      FLEXIBLE SIGMOIDOSCOPY N/A 9/19/2019    Procedure: SIGMOIDOSCOPY, FLEXIBLE;  Surgeon: Biju Kramer III, MD;  Location: [facility] ENDO;  Service: Endoscopy;  Laterality: N/A;    HYSTERECTOMY      partial       Review of patient's allergies indicates:   Allergen Reactions    Iodinated contrast media     Strawberries [strawberry] Hives and Itching       Current Facility-Administered Medications on File Prior to Encounter   Medication    [COMPLETED] nitroGLYCERIN (NITROSTAT) 0.4 MG SL tablet    [DISCONTINUED] albuterol inhaler 2 puff    [DISCONTINUED] albuterol-ipratropium 2.5 mg-0.5 mg/3 mL nebulizer solution 3 mL    [DISCONTINUED] amLODIPine tablet 10 mg    [DISCONTINUED] aspirin EC tablet 81 mg    [DISCONTINUED] atorvastatin tablet 80 mg    [DISCONTINUED] cholestyramine-aspartame 4 gram packet 4 g    [DISCONTINUED] fluticasone furoate-vilanteroL 100-25 mcg/dose diskus inhaler 1 puff    [DISCONTINUED] furosemide tablet 40 mg    [DISCONTINUED] gabapentin capsule 100 mg    [DISCONTINUED] hydrALAZINE injection 5 mg    [DISCONTINUED] isosorbide mononitrate 24 hr tablet 60 mg    [DISCONTINUED]  lisinopriL tablet 10 mg    [DISCONTINUED] metoprolol succinate (TOPROL-XL) 24 hr tablet 50 mg    [DISCONTINUED] nitroGLYCERIN SL tablet 0.4 mg    [DISCONTINUED] ranolazine 12 hr tablet 500 mg    [DISCONTINUED] sodium chloride 0.9% flush 10 mL    [DISCONTINUED] ticagrelor tablet 90 mg    [DISCONTINUED] tiotropium bromide 2.5 mcg/actuation inhaler 2 puff    [DISCONTINUED] umeclidinium 62.5 mcg/actuation inhalation capsule 62.5 mcg     Current Outpatient Medications on File Prior to Encounter   Medication Sig    acetaminophen (TYLENOL) 325 MG tablet Take 2 tablets (650 mg total) by mouth every 4 (four) hours as needed.    albuterol (VENTOLIN HFA) 90 mcg/actuation inhaler Inhale 2 puffs into the lungs every 6 (six) hours as needed for Wheezing or Shortness of Breath. Rescue    aspirin (ECOTRIN) 81 MG EC tablet Take 81 mg by mouth every morning.     atorvastatin (LIPITOR) 80 MG tablet Take 80 mg by mouth once daily.    ergocalciferol (VITAMIN D2) 50,000 unit Cap Take 1 capsule (50,000 Units total) by mouth every 7 days.    ferrous sulfate 325 (65 FE) MG EC tablet Take 1 tablet (325 mg total) by mouth once daily.    fish oil-omega-3 fatty acids 300-1,000 mg capsule Take 1 capsule by mouth every morning.    ipratropium-albuteroL (COMBIVENT RESPIMAT)  mcg/actuation inhaler Inhale 2 puffs into the lungs every 4 (four) hours as needed for Shortness of Breath. Rescue    metoprolol succinate (TOPROL-XL) 50 MG 24 hr tablet Take 1 tablet (50 mg total) by mouth once daily.    nitroGLYCERIN 0.4 MG/DOSE TL SPRY (NITROLINGUAL) 400 mcg/spray spray USE ONE SPRAY UNDER THE TONGUE EVERY 5 MINUTES AS NEEDED FOR CHEST PAIN.  DO NOT EXCEED A TOTAL OF 3 SPRAYS IN 15 MINUTES    pantoprazole (PROTONIX) 40 MG tablet Take 1 tablet (40 mg total) by mouth once daily.    triamcinolone acetonide 0.1% (KENALOG) 0.1 % cream Apply topically 2 (two) times daily. For legs.    amLODIPine (NORVASC) 10 MG tablet Take 1 tablet (10 mg total) by mouth  once daily.    cholestyramine (QUESTRAN) 4 gram packet Take 1 packet (4 g total) by mouth 2 (two) times daily.    coenzyme Q10 100 mg capsule Take 100 mg by mouth every morning.    fluticasone-salmeterol diskus inhaler 250-50 mcg Inhale 1 puff into the lungs 2 (two) times daily.    furosemide (LASIX) 20 MG tablet Take 2 tablets (40 mg total) by mouth once daily.    gabapentin (NEURONTIN) 100 MG capsule Take 1 capsule (100 mg total) by mouth every evening.    isosorbide mononitrate (IMDUR) 30 MG 24 hr tablet Take 1 tablet (30 mg total) by mouth once daily.    lisinopriL 10 MG tablet Take 1 tablet (10 mg total) by mouth once daily. HOLD UNTIL OTHERWISE DIRECTED BY PRIMARY CARE PROVIDER    NARCAN 4 mg/actuation Spry     nitroGLYCERIN 0.2 mg/hr TD PT24 (NITRODUR) 0.2 mg/hr APPLY 1 PATCH TOPICALLY ONCE DAILY TO LEG.    ondansetron (ZOFRAN-ODT) 4 MG TbDL Take 2 tablets (8 mg total) by mouth every 6 (six) hours as needed.    temazepam (RESTORIL) 15 mg Cap Take 15 mg by mouth nightly as needed.    umeclidinium (INCRUSE ELLIPTA) 62.5 mcg/actuation inhalation capsule Inhale 62.5 mcg into the lungs every morning. Controller    [DISCONTINUED] benazepriL (LOTENSIN) 40 MG tablet Take 1 tablet (40 mg total) by mouth once daily.    [DISCONTINUED] lovastatin (MEVACOR) 40 MG tablet Take 1 tablet (40 mg total) by mouth every other day.    [DISCONTINUED] ticagrelor (BRILINTA) 90 mg tablet Take 1 tablet (90 mg total) by mouth 2 (two) times a day.     Family History       Problem Relation (Age of Onset)    Febrile seizures Mother    Heart failure Father          Tobacco Use    Smoking status: Former Smoker     Packs/day: 3.00     Years: 50.00     Pack years: 150.00     Types: Cigarettes     Start date: 3/30/1964     Quit date: 2006     Years since quittin.1    Smokeless tobacco: Never Used    Tobacco comment: ex smoker 15 years   Substance and Sexual Activity    Alcohol use: Yes    Drug use: No    Sexual activity: Never      Review of Systems   Constitutional: Negative.   HENT: Negative.     Eyes: Negative.    Cardiovascular:  Positive for chest pain. Negative for dyspnea on exertion, leg swelling, orthopnea, palpitations and syncope.   Respiratory:  Positive for cough.    Endocrine: Negative.    Hematologic/Lymphatic: Negative.    Skin: Negative.    Musculoskeletal: Negative.    Gastrointestinal: Negative.    Genitourinary: Negative.    Neurological: Negative.    Psychiatric/Behavioral: Negative.     Allergic/Immunologic: Negative.    Objective:     Vital Signs (Most Recent):  Temp: 99.3 °F (37.4 °C) (04/08/22 2100)  Pulse: 85 (04/08/22 2100)  Resp: 20 (04/08/22 2100)  BP: 133/64 (04/08/22 2100)  SpO2: 97 % (04/08/22 2100) Vital Signs (24h Range):  Temp:  [97.6 °F (36.4 °C)-99.3 °F (37.4 °C)] 99.3 °F (37.4 °C)  Pulse:  [] 85  Resp:  [18-28] 20  SpO2:  [97 %-99 %] 97 %  BP: (128-161)/(59-70) 133/64     Weight: 80 kg (176 lb 5.9 oz)  Body mass index is 31.24 kg/m².    SpO2: 97 %  O2 Device (Oxygen Therapy): nasal cannula    No intake or output data in the 24 hours ending 04/08/22 2223    Lines/Drains/Airways       Drain  Duration             Female External Urinary Catheter 03/27/22 2000 12 days              Peripheral Intravenous Line  Duration                  Peripheral IV - Single Lumen 04/07/22 1300 20 G Anterior;Left Forearm 1 day                    Physical Exam  Constitutional:       General: She is not in acute distress.     Appearance: She is obese. She is not ill-appearing.      Comments: Pleasant elderly female   HENT:      Head: Normocephalic.      Mouth/Throat:      Mouth: Mucous membranes are moist.   Eyes:      Extraocular Movements: Extraocular movements intact.   Neck:      Comments: No JVD  Cardiovascular:      Rate and Rhythm: Normal rate and regular rhythm.      Pulses: Normal pulses.      Heart sounds: Normal heart sounds. No murmur heard.  Pulmonary:      Effort: Pulmonary effort is normal. No respiratory  distress.      Comments: Diminished air movement bilaterally  On NC  Abdominal:      General: Bowel sounds are normal. There is no distension.      Palpations: Abdomen is soft.      Tenderness: There is no abdominal tenderness.   Musculoskeletal:      Cervical back: Neck supple.      Right lower leg: No edema.      Left lower leg: No edema.   Skin:     General: Skin is warm.      Comments: Dry, sacral wound with mild erythema; no drainage or fluctuance   Neurological:      General: No focal deficit present.      Mental Status: She is alert and oriented to person, place, and time.   Psychiatric:         Mood and Affect: Mood normal.         Behavior: Behavior normal.       Significant Labs: BMP:   Recent Labs   Lab 04/07/22 1215 04/08/22  0414   * 88    144   K 4.9 4.5    109   CO2 27 24   BUN 33* 28*   CREATININE 1.3 1.3   CALCIUM 8.9 9.2   , CMP   Recent Labs   Lab 04/07/22 1215 04/08/22  0414    144   K 4.9 4.5    109   CO2 27 24   * 88   BUN 33* 28*   CREATININE 1.3 1.3   CALCIUM 8.9 9.2   PROT 6.3  --    ALBUMIN 3.3*  --    BILITOT 0.7  --    ALKPHOS 73  --    AST 17  --    ALT 16  --    ANIONGAP 7* 11   ESTGFRAFRICA 46.7* 46.7*   EGFRNONAA 40.5* 40.5*   , CBC   Recent Labs   Lab 04/07/22 1215 04/08/22  0414   WBC 8.33 8.20   HGB 9.7* 9.8*   HCT 31.4* 32.9*    148*   , INR   Recent Labs   Lab 04/07/22  1215   INR 1.1   , Lipid Panel No results for input(s): CHOL, HDL, LDLCALC, TRIG, CHOLHDL in the last 48 hours., and Troponin   Recent Labs   Lab 04/07/22 1215 04/07/22  1854   TROPONINI 0.058* 0.066*       Significant Imaging:   TTE 2/12/2022  The left ventricle is normal in size with normal systolic function.  The estimated ejection fraction is 65%.  There is a minor septal wall motion abnormality.  Severe left atrial enlargement.  Grade II left ventricular diastolic dysfunction.  Normal right ventricular size with normal right ventricular systolic  function.  Normal central venous pressure (3 mmHg).    Angiogram 2/10/2022  The RPAV lesion was 100% stenosed.  The RPDA lesion was 100% stenosed.  The Prox Cx to Mid Cx lesion was 80% stenosed.  The Dist Cx lesion was 70% stenosed.  The 1st LPL lesion was 90% stenosed.  The Prox RCA-2 lesion was 70% stenosed.  The Prox RCA-1 lesion was 90% stenosed.  The Mid LAD-2 lesion was 60% stenosed.  The estimated blood loss was <50 mL.  There was three vessel coronary artery disease.

## 2022-04-09 NOTE — ASSESSMENT & PLAN NOTE
Hb 9.8 on arrival and at baseline. Prior rectus sheath hematoma after angiogram 2/2022 requiring inferior epigastric artery embolization. No s/s bleeding and on DAPT.     - Continue home ferrous sulfate  - Type and screen for potential procedure  - Trend CBC daily  - Transfuse to maintain Hb >8 with CAD

## 2022-04-09 NOTE — PROGRESS NOTES
[facility]  Cardiology  Progress Note    Patient Name: Marisol Kerr  MRN: [id number]  Admission Date: 4/8/2022  Hospital Length of Stay: 1 days  Code Status: Full Code   Attending Physician: Karl Ontiveros MD   Primary Care Physician: Khadar Dwyer MD  Expected Discharge Date: 4/12/2022  Principal Problem:NSTEMI (non-ST elevated myocardial infarction)    Subjective:     Hospital Course:   Pt asymptomatic on DAPT and Heparin. Heparin Dc'd upon arrival in setting of hx of rectus sheath hematoma managed by IR after last L heart cath. Euvolemic and managed on home Lasix. IC planned intervention on Monday unless pt clinically declines.       Interval History: NAEON, asymptomatic    Review of Systems   Constitutional: Negative for diaphoresis, fever and weight gain.   HENT: Negative.     Cardiovascular:  Negative for chest pain, dyspnea on exertion, leg swelling and palpitations.   Respiratory:  Negative for cough, shortness of breath and wheezing.    Skin: Negative.    Musculoskeletal: Negative.    Gastrointestinal:  Negative for constipation, diarrhea, melena, nausea and vomiting.   Genitourinary: Negative.    Neurological:  Negative for dizziness and light-headedness.   Psychiatric/Behavioral: Negative.     Objective:     Vital Signs (Most Recent):  Temp: 97.9 °F (36.6 °C) (04/09/22 1200)  Pulse: 71 (04/09/22 1200)  Resp: 18 (04/09/22 1200)  BP: 128/60 (04/09/22 1200)  SpO2: 98 % (04/09/22 1200) Vital Signs (24h Range):  Temp:  [97.5 °F (36.4 °C)-99.8 °F (37.7 °C)] 97.9 °F (36.6 °C)  Pulse:  [63-85] 71  Resp:  [17-21] 18  SpO2:  [96 %-99 %] 98 %  BP: (119-146)/(56-64) 128/60     Weight: 78.9 kg (174 lb)  Body mass index is 30.82 kg/m².     SpO2: 98 %  O2 Device (Oxygen Therapy): nasal cannula      Intake/Output Summary (Last 24 hours) at 4/9/2022 1256  Last data filed at 4/9/2022 1028  Gross per 24 hour   Intake 240 ml   Output 1100 ml   Net -860 ml       Lines/Drains/Airways       Drain   Duration             Female External Urinary Catheter 03/27/22 2000 12 days              Peripheral Intravenous Line  Duration                  Peripheral IV - Single Lumen 04/07/22 1300 20 G Anterior;Left Forearm 1 day                    Physical Exam  Vitals and nursing note reviewed.   Constitutional:       Appearance: Normal appearance.   HENT:      Head: Normocephalic and atraumatic.      Nose: Nose normal.      Mouth/Throat:      Mouth: Mucous membranes are moist.      Pharynx: Oropharynx is clear.   Eyes:      Extraocular Movements: Extraocular movements intact.   Cardiovascular:      Rate and Rhythm: Normal rate and regular rhythm.   Pulmonary:      Effort: Pulmonary effort is normal.      Breath sounds: Normal breath sounds. No wheezing or rales.   Abdominal:      General: Bowel sounds are normal.      Palpations: Abdomen is soft.      Tenderness: There is no abdominal tenderness.      Hernia: A hernia (R hernia abdominal hernia, reducable, non-painful) is present.   Musculoskeletal:         General: Normal range of motion.      Cervical back: Normal range of motion and neck supple.   Skin:     General: Skin is warm and dry.   Neurological:      General: No focal deficit present.      Mental Status: She is alert and oriented to person, place, and time.   Psychiatric:         Mood and Affect: Mood normal.         Behavior: Behavior normal.     Significant Labs: All pertinent lab results from the last 24 hours have been reviewed.    Significant Imaging:  No new imaging since prior note    Assessment and Plan:     * NSTEMI (non-ST elevated myocardial infarction)  Acute onset chest pressure, EKG with lateral ST depressions, and troponin 0.06 (mildly elevated) all consistent with NSTEMI. Currently stable and asymptomatic. 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 Outpt plan to follow-up with Dr Chappell for high risk PCI. Troponin peaked at 0.07.     - Start  ACS reloaded with ASA/Brilinta, will not continue heparin ggt as pt has hx of rectus sheath hematoma during last L heart cath.   - Continue atorvastatin, metoprolol  - f/u Echo  - SL Nitro PRN for chest pain  - Interventional Cardiology consult for high risk PCI early next week with Dr. Chappell    Normocytic anemia  Hb 9.8 on arrival and at baseline. Prior rectus sheath hematoma after angiogram 2/2022 requiring inferior epigastric artery embolization. No s/s bleeding and on DAPT.     - Continue home ferrous sulfate  - Type and screen for potential procedure  - Trend CBC daily  - Transfuse to maintain Hb >8 with CAD    Chronic diastolic congestive heart failure  Normal EF 2/2022 and no s/s of exacerbation.    - Continue home furosemide  - Limited TTE in the morning  - Daily weights, strict I/Os  - Not on MRA or SLGT2; can consider as outpt      Chronic hypoxemic respiratory failure  On NC at home and underlying COPD. No s/s exacerbation.    - Continue home inhaler regimen  - Albuterol nebs PRN  - Supplemental oxygen    CKD (chronic kidney disease), stage III  Cr 1.3 on arrival with baseline 1.3-1.6.    - Continue home lisinopril  - Trend BMP, Mag, Phos  - Strict I/Os  - Avoid nephrotoxins    Gastroesophageal reflux disease without esophagitis  - Continue home PPI    Essential hypertension, benign  - Continue home amlodipine, lisinopril        VTE Risk Mitigation (From admission, onward)         Ordered     IP VTE HIGH RISK PATIENT  Once         04/08/22 2203     Place sequential compression device  Until discontinued         04/08/22 2203                Amari Argueta MD  Cardiology  [facility]

## 2022-04-09 NOTE — SUBJECTIVE & OBJECTIVE
Interval History: NAEON, asymptomatic    Review of Systems   Constitutional: Negative for diaphoresis, fever and weight gain.   HENT: Negative.     Cardiovascular:  Negative for chest pain, dyspnea on exertion, leg swelling and palpitations.   Respiratory:  Negative for cough, shortness of breath and wheezing.    Skin: Negative.    Musculoskeletal: Negative.    Gastrointestinal:  Negative for constipation, diarrhea, melena, nausea and vomiting.   Genitourinary: Negative.    Neurological:  Negative for dizziness and light-headedness.   Psychiatric/Behavioral: Negative.     Objective:     Vital Signs (Most Recent):  Temp: 97.9 °F (36.6 °C) (04/09/22 1200)  Pulse: 71 (04/09/22 1200)  Resp: 18 (04/09/22 1200)  BP: 128/60 (04/09/22 1200)  SpO2: 98 % (04/09/22 1200) Vital Signs (24h Range):  Temp:  [97.5 °F (36.4 °C)-99.8 °F (37.7 °C)] 97.9 °F (36.6 °C)  Pulse:  [63-85] 71  Resp:  [17-21] 18  SpO2:  [96 %-99 %] 98 %  BP: (119-146)/(56-64) 128/60     Weight: 78.9 kg (174 lb)  Body mass index is 30.82 kg/m².     SpO2: 98 %  O2 Device (Oxygen Therapy): nasal cannula      Intake/Output Summary (Last 24 hours) at 4/9/2022 1256  Last data filed at 4/9/2022 1028  Gross per 24 hour   Intake 240 ml   Output 1100 ml   Net -860 ml       Lines/Drains/Airways       Drain  Duration             Female External Urinary Catheter 03/27/22 2000 12 days              Peripheral Intravenous Line  Duration                  Peripheral IV - Single Lumen 04/07/22 1300 20 G Anterior;Left Forearm 1 day                    Physical Exam  Vitals and nursing note reviewed.   Constitutional:       Appearance: Normal appearance.   HENT:      Head: Normocephalic and atraumatic.      Nose: Nose normal.      Mouth/Throat:      Mouth: Mucous membranes are moist.      Pharynx: Oropharynx is clear.   Eyes:      Extraocular Movements: Extraocular movements intact.   Cardiovascular:      Rate and Rhythm: Normal rate and regular rhythm.   Pulmonary:      Effort:  Pulmonary effort is normal.      Breath sounds: Normal breath sounds. No wheezing or rales.   Abdominal:      General: Bowel sounds are normal.      Palpations: Abdomen is soft.      Tenderness: There is no abdominal tenderness.      Hernia: A hernia (R hernia abdominal hernia, reducable, non-painful) is present.   Musculoskeletal:         General: Normal range of motion.      Cervical back: Normal range of motion and neck supple.   Skin:     General: Skin is warm and dry.   Neurological:      General: No focal deficit present.      Mental Status: She is alert and oriented to person, place, and time.   Psychiatric:         Mood and Affect: Mood normal.         Behavior: Behavior normal.     Significant Labs: All pertinent lab results from the last 24 hours have been reviewed.    Significant Imaging:  No new imaging since prior note

## 2022-04-09 NOTE — HOSPITAL COURSE
Patient was asymptomatic on DAPT and Heparin, however, heparin was discontinued on arrival in the setting of known history of rectus sheath hematoma which occurred as a spontaneous complication post-LHC. Patient originally scheduled to undergo high risk PCI whilst inpatient w/ Dr. Chappell but as per interventional cardiology, this will now take place in the outpatient setting. Of note, patient has developed worsening cough w/ increased clear sputum production and pleuritic chest pain. She remains afebrile and overall non infectious appearing. Repeat CXR w/ increased RML and RLL infiltrates. Patient's cough has not improved w/ symptomatic treatment. Given her high risk of decompensation, s/p HAP treatment w/ azithromycin and Zosyn. Treated for CHF exacerbation w/ IV lasix w/ good improvement in symptoms. Additionally, concern for microaspiration 2/2 ?oropharyngeal dysphasia vs globus sensation - evaluated by SLP and GI evaluation recommended. Esophogram done 4/14/22 shows esophageal dysmotility characterized by dimished secondary waves and presence of tertiary contractions. Cr 1.4 --> 2.0; likely pre-renal in the setting of decreased intake. Will administer IVFs PRN and continue to monitor. Symptoms improved w/ diltiazem and patient now tolerating PO intake w/ no issues. Cr improved. Will discharge home w/ HH PT/OT and follow up BNP given new prescription of torsemide 10mg QD. Close follow up w/ cardiology, interventional cardiology and GI.

## 2022-04-09 NOTE — ASSESSMENT & PLAN NOTE
Acute onset chest pressure, EKG with lateral ST depressions, and troponin 0.06 (mildly elevated) all consistent with NSTEMI. Currently stable and asymptomatic. 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 Outpt plan to follow-up with Dr Chappell for high risk PCI.     - Start ACS with heparin gtt, reloaded with ASA/Brilinta  - Continue atorvastatin, metoprolol  - Trend troponin/EKG Q6 hrs until downtrending  - Limited TTE for EF and WMA (normal EF with minor septal WMA 2/2022)  - SL Nitro PRN for chest pain  - Interventional Cardiology consult for high risk PCI; likely early next week

## 2022-04-09 NOTE — ASSESSMENT & PLAN NOTE
Acute onset chest pressure, EKG with lateral ST depressions, and troponin 0.06 (mildly elevated) all consistent with NSTEMI. Currently stable and asymptomatic. 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 Outpt plan to follow-up with Dr Chappell for high risk PCI. Troponin peaked at 0.07.     - Start ACS reloaded with ASA/Brilinta, will not continue heparin ggt as pt has hx of rectus sheath hematoma during last L heart cath.   - Continue atorvastatin, metoprolol  - f/u Echo  - SL Nitro PRN for chest pain  - Interventional Cardiology consult for high risk PCI early next week with Dr. Chappell

## 2022-04-09 NOTE — ASSESSMENT & PLAN NOTE
Cr 1.3 on arrival with baseline 1.3-1.6.    - Continue home lisinopril  - Trend BMP, Mag, Phos  - Strict I/Os  - Avoid nephrotoxins

## 2022-04-09 NOTE — PLAN OF CARE
Echo done. Mag replaced. Titrate O2 to 2 L NC, sat O2 >95%. BG monitored. Heparin gtt discontinued. Pt educated on fall risk and remained free from falls/trauma/injury. Denies chest pain, SOB, palpitations, dizziness, pain, or discomfort. Plan of care reviewed with pt, all questions answered. Bed locked in lowest position, call bell within reach, no acute distress noted, will continue to monitor.

## 2022-04-09 NOTE — HPI
76 year old female with PMHx of DM2, HTN, HLD, CKD, COPD on home O2  recent STEMI s/p LHC who is not amenable for CABG presenting with chest pressure.     She had left heart catheterization 2/2022 and had complication with rectus sheet hematoma/bleeding and transferred to [facility] [place] for IR intervention and embolization was done recently and admitted to rehab there and got discharged recently.    Her chest pressure occurred while showering this morning and felt similar to prior episodes. She describes it a pressure in the middle of her chest without radiation. Resolved with SL Nitro and taken to ED.     Mild troponin elevation and admitted at [place]. EKG with lateral ST depressions that have since resolved. Patient had appointment scheduled 4/8 with Dr Westley Chappell for planning of possible  interventionist.    Transferred to [facility] [place] for further mgmt and interventional cardiology. Afebrile, hemodynamically stable and asymptomatic on arrival. Not on heparin gtt. Reloaded with DAPT and ACS protocol.

## 2022-04-09 NOTE — H&P
[facility] - Cardiology Stepdown  Cardiology  History and Physical     Patient Name: Marisol Kerr  MRN: [id number]  Admission Date: 4/8/2022  Code Status: Full Code   Attending Provider: Karl Ontiveros MD   Primary Care Physician: Khadar Dwyer MD  Principal Problem:<principal problem not specified>    Patient information was obtained from patient and ER records.     Subjective:     Chief Complaint:  NSTEMI     HPI:  76 year old female with PMHx of DM2, HTN, HLD, CKD, COPD on home O2  recent STEMI s/p [facility] who is not amenable for CABG presenting with chest pressure.     She had left heart catheterization 2/2022 and had complication with rectus sheet hematoma/bleeding and transferred to [facility] for IR intervention and embolization was done recently and admitted to rehab there and got discharged recently.    Her chest pressure occurred while showering this morning and felt similar to prior episodes. She describes it a pressure in the middle of her chest without radiation. Resolved with SL Nitro and taken to ED.     Mild troponin elevation and admitted at [place]. EKG with lateral ST depressions that have since resolved. Patient had appointment scheduled 4/8 with Dr Westley Chappell for planning of possible  interventionist.    Transferred to [facility] for further mgmt and interventional cardiology. Afebrile, hemodynamically stable and asymptomatic on arrival. Not on heparin gtt. Reloaded with DAPT and ACS protocol.       Past Medical History:   Diagnosis Date    CAD (coronary artery disease)     Cancer     colon    CHF (congestive heart failure)     Colitis     Colon cancer     COPD (chronic obstructive pulmonary disease)     Decreased hearing     Diabetes mellitus     Diabetes mellitus, type 2     Hypercholesterolemia     Hypertension     Hypoxemia     Insomnia     Obesity     Osteoarthritis        Past Surgical History:   Procedure Laterality Date    ANGIOGRAM, CORONARY, WITH LEFT HEART CATHETERIZATION N/A  2/10/2022    Procedure: Angiogram, Coronary, with Left Heart Cath;  Surgeon: Cristian Benjamin MD;  Location: [facility] CATH/EP LAB;  Service: Cardiology;  Laterality: N/A;    CARDIAC CATHETERIZATION      CHOLECYSTECTOMY      COLECTOMY      CORONARY ANGIOPLASTY      CORONARY STENT PLACEMENT      EXTERNAL EAR SURGERY      EYE SURGERY      FLEXIBLE SIGMOIDOSCOPY N/A 9/19/2019    Procedure: SIGMOIDOSCOPY, FLEXIBLE;  Surgeon: Biju Kramer III, MD;  Location: [facility] ENDO;  Service: Endoscopy;  Laterality: N/A;    HYSTERECTOMY      partial       Review of patient's allergies indicates:   Allergen Reactions    Iodinated contrast media     Strawberries [strawberry] Hives and Itching       Current Facility-Administered Medications on File Prior to Encounter   Medication    [COMPLETED] nitroGLYCERIN (NITROSTAT) 0.4 MG SL tablet    [DISCONTINUED] albuterol inhaler 2 puff    [DISCONTINUED] albuterol-ipratropium 2.5 mg-0.5 mg/3 mL nebulizer solution 3 mL    [DISCONTINUED] amLODIPine tablet 10 mg    [DISCONTINUED] aspirin EC tablet 81 mg    [DISCONTINUED] atorvastatin tablet 80 mg    [DISCONTINUED] cholestyramine-aspartame 4 gram packet 4 g    [DISCONTINUED] fluticasone furoate-vilanteroL 100-25 mcg/dose diskus inhaler 1 puff    [DISCONTINUED] furosemide tablet 40 mg    [DISCONTINUED] gabapentin capsule 100 mg    [DISCONTINUED] hydrALAZINE injection 5 mg    [DISCONTINUED] isosorbide mononitrate 24 hr tablet 60 mg    [DISCONTINUED] lisinopriL tablet 10 mg    [DISCONTINUED] metoprolol succinate (TOPROL-XL) 24 hr tablet 50 mg    [DISCONTINUED] nitroGLYCERIN SL tablet 0.4 mg    [DISCONTINUED] ranolazine 12 hr tablet 500 mg    [DISCONTINUED] sodium chloride 0.9% flush 10 mL    [DISCONTINUED] ticagrelor tablet 90 mg    [DISCONTINUED] tiotropium bromide 2.5 mcg/actuation inhaler 2 puff    [DISCONTINUED] umeclidinium 62.5 mcg/actuation inhalation capsule 62.5 mcg     Current Outpatient Medications on  File Prior to Encounter   Medication Sig    acetaminophen (TYLENOL) 325 MG tablet Take 2 tablets (650 mg total) by mouth every 4 (four) hours as needed.    albuterol (VENTOLIN HFA) 90 mcg/actuation inhaler Inhale 2 puffs into the lungs every 6 (six) hours as needed for Wheezing or Shortness of Breath. Rescue    aspirin (ECOTRIN) 81 MG EC tablet Take 81 mg by mouth every morning.     atorvastatin (LIPITOR) 80 MG tablet Take 80 mg by mouth once daily.    ergocalciferol (VITAMIN D2) 50,000 unit Cap Take 1 capsule (50,000 Units total) by mouth every 7 days.    ferrous sulfate 325 (65 FE) MG EC tablet Take 1 tablet (325 mg total) by mouth once daily.    fish oil-omega-3 fatty acids 300-1,000 mg capsule Take 1 capsule by mouth every morning.    ipratropium-albuteroL (COMBIVENT RESPIMAT)  mcg/actuation inhaler Inhale 2 puffs into the lungs every 4 (four) hours as needed for Shortness of Breath. Rescue    metoprolol succinate (TOPROL-XL) 50 MG 24 hr tablet Take 1 tablet (50 mg total) by mouth once daily.    nitroGLYCERIN 0.4 MG/DOSE TL SPRY (NITROLINGUAL) 400 mcg/spray spray USE ONE SPRAY UNDER THE TONGUE EVERY 5 MINUTES AS NEEDED FOR CHEST PAIN.  DO NOT EXCEED A TOTAL OF 3 SPRAYS IN 15 MINUTES    pantoprazole (PROTONIX) 40 MG tablet Take 1 tablet (40 mg total) by mouth once daily.    triamcinolone acetonide 0.1% (KENALOG) 0.1 % cream Apply topically 2 (two) times daily. For legs.    amLODIPine (NORVASC) 10 MG tablet Take 1 tablet (10 mg total) by mouth once daily.    cholestyramine (QUESTRAN) 4 gram packet Take 1 packet (4 g total) by mouth 2 (two) times daily.    coenzyme Q10 100 mg capsule Take 100 mg by mouth every morning.    fluticasone-salmeterol diskus inhaler 250-50 mcg Inhale 1 puff into the lungs 2 (two) times daily.    furosemide (LASIX) 20 MG tablet Take 2 tablets (40 mg total) by mouth once daily.    gabapentin (NEURONTIN) 100 MG capsule Take 1 capsule (100 mg total) by mouth every  evening.    isosorbide mononitrate (IMDUR) 30 MG 24 hr tablet Take 1 tablet (30 mg total) by mouth once daily.    lisinopriL 10 MG tablet Take 1 tablet (10 mg total) by mouth once daily. HOLD UNTIL OTHERWISE DIRECTED BY PRIMARY CARE PROVIDER    NARCAN 4 mg/actuation Spry     nitroGLYCERIN 0.2 mg/hr TD PT24 (NITRODUR) 0.2 mg/hr APPLY 1 PATCH TOPICALLY ONCE DAILY TO LEG.    ondansetron (ZOFRAN-ODT) 4 MG TbDL Take 2 tablets (8 mg total) by mouth every 6 (six) hours as needed.    temazepam (RESTORIL) 15 mg Cap Take 15 mg by mouth nightly as needed.    umeclidinium (INCRUSE ELLIPTA) 62.5 mcg/actuation inhalation capsule Inhale 62.5 mcg into the lungs every morning. Controller    [DISCONTINUED] benazepriL (LOTENSIN) 40 MG tablet Take 1 tablet (40 mg total) by mouth once daily.    [DISCONTINUED] lovastatin (MEVACOR) 40 MG tablet Take 1 tablet (40 mg total) by mouth every other day.    [DISCONTINUED] ticagrelor (BRILINTA) 90 mg tablet Take 1 tablet (90 mg total) by mouth 2 (two) times a day.     Family History       Problem Relation (Age of Onset)    Febrile seizures Mother    Heart failure Father          Tobacco Use    Smoking status: Former Smoker     Packs/day: 3.00     Years: 50.00     Pack years: 150.00     Types: Cigarettes     Start date: 3/30/1964     Quit date: 2006     Years since quittin.1    Smokeless tobacco: Never Used    Tobacco comment: ex smoker 15 years   Substance and Sexual Activity    Alcohol use: Yes    Drug use: No    Sexual activity: Never     Review of Systems   Constitutional: Negative.   HENT: Negative.     Eyes: Negative.    Cardiovascular:  Positive for chest pain. Negative for dyspnea on exertion, leg swelling, orthopnea, palpitations and syncope.   Respiratory:  Positive for cough.    Endocrine: Negative.    Hematologic/Lymphatic: Negative.    Skin: Negative.    Musculoskeletal: Negative.    Gastrointestinal: Negative.    Genitourinary: Negative.    Neurological:  Negative.    Psychiatric/Behavioral: Negative.     Allergic/Immunologic: Negative.    Objective:     Vital Signs (Most Recent):  Temp: 99.3 °F (37.4 °C) (04/08/22 2100)  Pulse: 85 (04/08/22 2100)  Resp: 20 (04/08/22 2100)  BP: 133/64 (04/08/22 2100)  SpO2: 97 % (04/08/22 2100) Vital Signs (24h Range):  Temp:  [97.6 °F (36.4 °C)-99.3 °F (37.4 °C)] 99.3 °F (37.4 °C)  Pulse:  [] 85  Resp:  [18-28] 20  SpO2:  [97 %-99 %] 97 %  BP: (128-161)/(59-70) 133/64     Weight: 80 kg (176 lb 5.9 oz)  Body mass index is 31.24 kg/m².    SpO2: 97 %  O2 Device (Oxygen Therapy): nasal cannula    No intake or output data in the 24 hours ending 04/08/22 2223    Lines/Drains/Airways       Drain  Duration             Female External Urinary Catheter 03/27/22 2000 12 days              Peripheral Intravenous Line  Duration                  Peripheral IV - Single Lumen 04/07/22 1300 20 G Anterior;Left Forearm 1 day                    Physical Exam  Constitutional:       General: She is not in acute distress.     Appearance: She is obese. She is not ill-appearing.      Comments: Pleasant elderly female   HENT:      Head: Normocephalic.      Mouth/Throat:      Mouth: Mucous membranes are moist.   Eyes:      Extraocular Movements: Extraocular movements intact.   Neck:      Comments: No JVD  Cardiovascular:      Rate and Rhythm: Normal rate and regular rhythm.      Pulses: Normal pulses.      Heart sounds: Normal heart sounds. No murmur heard.  Pulmonary:      Effort: Pulmonary effort is normal. No respiratory distress.      Comments: Diminished air movement bilaterally  On NC  Abdominal:      General: Bowel sounds are normal. There is no distension.      Palpations: Abdomen is soft.      Tenderness: There is no abdominal tenderness.   Musculoskeletal:      Cervical back: Neck supple.      Right lower leg: No edema.      Left lower leg: No edema.   Skin:     General: Skin is warm.      Comments: Dry, sacral wound with mild erythema; no  drainage or fluctuance   Neurological:      General: No focal deficit present.      Mental Status: She is alert and oriented to person, place, and time.   Psychiatric:         Mood and Affect: Mood normal.         Behavior: Behavior normal.       Significant Labs: BMP:   Recent Labs   Lab 04/07/22  1215 04/08/22  0414   * 88    144   K 4.9 4.5    109   CO2 27 24   BUN 33* 28*   CREATININE 1.3 1.3   CALCIUM 8.9 9.2   , CMP   Recent Labs   Lab 04/07/22  1215 04/08/22  0414    144   K 4.9 4.5    109   CO2 27 24   * 88   BUN 33* 28*   CREATININE 1.3 1.3   CALCIUM 8.9 9.2   PROT 6.3  --    ALBUMIN 3.3*  --    BILITOT 0.7  --    ALKPHOS 73  --    AST 17  --    ALT 16  --    ANIONGAP 7* 11   ESTGFRAFRICA 46.7* 46.7*   EGFRNONAA 40.5* 40.5*   , CBC   Recent Labs   Lab 04/07/22  1215 04/08/22  0414   WBC 8.33 8.20   HGB 9.7* 9.8*   HCT 31.4* 32.9*    148*   , INR   Recent Labs   Lab 04/07/22 1215   INR 1.1   , Lipid Panel No results for input(s): CHOL, HDL, LDLCALC, TRIG, CHOLHDL in the last 48 hours., and Troponin   Recent Labs   Lab 04/07/22  1215 04/07/22  1854   TROPONINI 0.058* 0.066*       Significant Imaging:   TTE 2/12/2022  · The left ventricle is normal in size with normal systolic function.  · The estimated ejection fraction is 65%.  · There is a minor septal wall motion abnormality.  · Severe left atrial enlargement.  · Grade II left ventricular diastolic dysfunction.  · Normal right ventricular size with normal right ventricular systolic function.  · Normal central venous pressure (3 mmHg).    Angiogram 2/10/2022  · The RPAV lesion was 100% stenosed.  · The RPDA lesion was 100% stenosed.  · The Prox Cx to Mid Cx lesion was 80% stenosed.  · The Dist Cx lesion was 70% stenosed.  · The 1st LPL lesion was 90% stenosed.  · The Prox RCA-2 lesion was 70% stenosed.  · The Prox RCA-1 lesion was 90% stenosed.  · The Mid LAD-2 lesion was 60% stenosed.  · The estimated blood  loss was <50 mL.  · There was three vessel coronary artery disease.      Assessment and Plan:     Normocytic anemia  Hb 9.8 on arrival and at baseline. Prior rectus sheath hematoma after angiogram 2/2022 requiring inferior epigastric artery embolization. No s/s bleeding and on DAPT.     - Continue home ferrous sulfate  - Type and screen for potential procedure  - Trend CBC daily  - Transfuse to maintain Hb >8 with CAD    NSTEMI (non-ST elevated myocardial infarction)  Acute onset chest pressure, EKG with lateral ST depressions, and troponin 0.06 (mildly elevated) all consistent with NSTEMI. Currently stable and asymptomatic. 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 Outpt plan to follow-up with Dr Chappell for high risk PCI.     - Start ACS with heparin gtt, reloaded with ASA/Brilinta  - Continue atorvastatin, metoprolol  - Trend troponin/EKG Q6 hrs until downtrending  - Limited TTE for EF and WMA (normal EF with minor septal WMA 2/2022)  - SL Nitro PRN for chest pain  - Interventional Cardiology consult for high risk PCI; likely early next week    Chronic diastolic congestive heart failure  Normal EF 2/2022 and no s/s of exacerbation.    - Continue home furosemide  - Limited TTE in the morning  - Daily weights, strict I/Os  - Not on MRA or SLGT2; can consider as outpt      Chronic hypoxemic respiratory failure  On NC at home and underlying COPD. No s/s exacerbation.    - Continue home inhaler regimen  - Albuterol nebs PRN  - Supplemental oxygen    CKD (chronic kidney disease), stage III  Cr 1.3 on arrival with baseline 1.3-1.6.    - Continue home lisinopril  - Trend BMP, Mag, Phos  - Strict I/Os  - Avoid nephrotoxins    Gastroesophageal reflux disease without esophagitis  - Continue home PPI    Essential hypertension, benign  - Continue home amlodipine, lisinopril        VTE Risk Mitigation (From admission, onward)         Ordered     heparin 25,000 units in  dextrose 5% (100 units/ml) IV bolus from bag - ADDITIONAL PRN BOLUS - 60 units/kg (max bolus 4000 units)  As needed (PRN)        Question:  Heparin Infusion Adjustment (DO NOT MODIFY ANSWER)  Answer:  [email or web address]\epic\Images\Pharmacy\HeparinInfusions\heparin LOW INTENSITY nomogram for [facility] [id number]    04/08/22 2203     heparin 25,000 units in dextrose 5% (100 units/ml) IV bolus from bag - ADDITIONAL PRN BOLUS - 30 units/kg (max bolus 4000 units)  As needed (PRN)        Question:  Heparin Infusion Adjustment (DO NOT MODIFY ANSWER)  Answer:  [email or web address]\epic\Images\Pharmacy\HeparinInfusions\heparin LOW INTENSITY nomogram for [facility] [id number].pdf    04/08/22 2203     heparin 25,000 units in dextrose 5% (100 units/ml) IV bolus from bag INITIAL BOLUS (max bolus 4000 units)  Once        Question:  Heparin Infusion Adjustment (DO NOT MODIFY ANSWER)  Answer:  [email or web address]\epic\Images\Pharmacy\HeparinInfusions\heparin LOW INTENSITY nomogram for [facility] [id number]    04/08/22 2203     heparin 25,000 units in dextrose 5% 250 mL (100 units/mL) infusion LOW INTENSITY nomogram - [facility]  Continuous        Question Answer Comment   Heparin Infusion Adjustment (DO NOT MODIFY ANSWER) [email or web address]\epic\Images\Pharmacy\HeparinInfusions\heparin LOW INTENSITY nomogram for [facility] [id number]    Begin at (in units/kg/hr) 12        04/08/22 2203     IP VTE HIGH RISK PATIENT  Once         04/08/22 2203     Place sequential compression device  Until discontinued         04/08/22 2203              Case to be formally staffed with Dr Chance Ontiveros MD, in the morning.     García Johnson MD  Cardiology   [facility] - Cardiology Stepdown

## 2022-04-09 NOTE — ASSESSMENT & PLAN NOTE
On NC at home and underlying COPD. No s/s exacerbation.    - Continue home inhaler regimen  - Albuterol nebs PRN  - Supplemental oxygen

## 2022-04-10 LAB
ALBUMIN SERPL BCP-MCNC: 3 G/DL (ref 3.5–5.2)
ALP SERPL-CCNC: 84 U/L (ref 55–135)
ALT SERPL W/O P-5'-P-CCNC: 9 U/L (ref 10–44)
ANION GAP SERPL CALC-SCNC: 11 MMOL/L (ref 8–16)
AST SERPL-CCNC: 14 U/L (ref 10–40)
BASOPHILS # BLD AUTO: 0.03 K/UL (ref 0–0.2)
BASOPHILS NFR BLD: 0.4 % (ref 0–1.9)
BILIRUB SERPL-MCNC: 0.6 MG/DL (ref 0.1–1)
BUN SERPL-MCNC: 32 MG/DL (ref 8–23)
CALCIUM SERPL-MCNC: 9.2 MG/DL (ref 8.7–10.5)
CHLORIDE SERPL-SCNC: 103 MMOL/L (ref 95–110)
CO2 SERPL-SCNC: 26 MMOL/L (ref 23–29)
CREAT SERPL-MCNC: 1.4 MG/DL (ref 0.5–1.4)
DIFFERENTIAL METHOD: ABNORMAL
EOSINOPHIL # BLD AUTO: 0.2 K/UL (ref 0–0.5)
EOSINOPHIL NFR BLD: 1.9 % (ref 0–8)
ERYTHROCYTE [DISTWIDTH] IN BLOOD BY AUTOMATED COUNT: 13.2 % (ref 11.5–14.5)
EST. GFR  (AFRICAN AMERICAN): 42.7 ML/MIN/1.73 M^2
EST. GFR  (NON AFRICAN AMERICAN): 37 ML/MIN/1.73 M^2
GLUCOSE SERPL-MCNC: 94 MG/DL (ref 70–110)
HCT VFR BLD AUTO: 36.5 % (ref 37–48.5)
HGB BLD-MCNC: 11.1 G/DL (ref 12–16)
IMM GRANULOCYTES # BLD AUTO: 0.03 K/UL (ref 0–0.04)
IMM GRANULOCYTES NFR BLD AUTO: 0.4 % (ref 0–0.5)
LYMPHOCYTES # BLD AUTO: 1.2 K/UL (ref 1–4.8)
LYMPHOCYTES NFR BLD: 14.7 % (ref 18–48)
MAGNESIUM SERPL-MCNC: 1.9 MG/DL (ref 1.6–2.6)
MCH RBC QN AUTO: 28.8 PG (ref 27–31)
MCHC RBC AUTO-ENTMCNC: 30.4 G/DL (ref 32–36)
MCV RBC AUTO: 95 FL (ref 82–98)
MONOCYTES # BLD AUTO: 0.5 K/UL (ref 0.3–1)
MONOCYTES NFR BLD: 6.2 % (ref 4–15)
NEUTROPHILS # BLD AUTO: 6.2 K/UL (ref 1.8–7.7)
NEUTROPHILS NFR BLD: 76.4 % (ref 38–73)
NRBC BLD-RTO: 0 /100 WBC
PLATELET # BLD AUTO: 152 K/UL (ref 150–450)
PMV BLD AUTO: 11.5 FL (ref 9.2–12.9)
POCT GLUCOSE: 100 MG/DL (ref 70–110)
POCT GLUCOSE: 117 MG/DL (ref 70–110)
POCT GLUCOSE: 126 MG/DL (ref 70–110)
POCT GLUCOSE: 192 MG/DL (ref 70–110)
POTASSIUM SERPL-SCNC: 4.5 MMOL/L (ref 3.5–5.1)
PROT SERPL-MCNC: 5.8 G/DL (ref 6–8.4)
RBC # BLD AUTO: 3.86 M/UL (ref 4–5.4)
SODIUM SERPL-SCNC: 140 MMOL/L (ref 136–145)
WBC # BLD AUTO: 8.07 K/UL (ref 3.9–12.7)

## 2022-04-10 PROCEDURE — 20600001 HC STEP DOWN PRIVATE ROOM

## 2022-04-10 PROCEDURE — 83735 ASSAY OF MAGNESIUM: CPT | Performed by: INTERNAL MEDICINE

## 2022-04-10 PROCEDURE — 80053 COMPREHEN METABOLIC PANEL: CPT

## 2022-04-10 PROCEDURE — 94761 N-INVAS EAR/PLS OXIMETRY MLT: CPT

## 2022-04-10 PROCEDURE — 93005 ELECTROCARDIOGRAM TRACING: CPT

## 2022-04-10 PROCEDURE — 27000221 HC OXYGEN, UP TO 24 HOURS

## 2022-04-10 PROCEDURE — 36415 COLL VENOUS BLD VENIPUNCTURE: CPT | Performed by: INTERNAL MEDICINE

## 2022-04-10 PROCEDURE — 36415 COLL VENOUS BLD VENIPUNCTURE: CPT

## 2022-04-10 PROCEDURE — 93010 EKG 12-LEAD: ICD-10-PCS | Mod: ,,, | Performed by: INTERNAL MEDICINE

## 2022-04-10 PROCEDURE — 63600175 PHARM REV CODE 636 W HCPCS

## 2022-04-10 PROCEDURE — 25000242 PHARM REV CODE 250 ALT 637 W/ HCPCS: Performed by: INTERNAL MEDICINE

## 2022-04-10 PROCEDURE — 63600175 PHARM REV CODE 636 W HCPCS: Performed by: INTERNAL MEDICINE

## 2022-04-10 PROCEDURE — 25000003 PHARM REV CODE 250: Performed by: INTERNAL MEDICINE

## 2022-04-10 PROCEDURE — 99233 SBSQ HOSP IP/OBS HIGH 50: CPT | Mod: GC,,, | Performed by: INTERNAL MEDICINE

## 2022-04-10 PROCEDURE — 85025 COMPLETE CBC W/AUTO DIFF WBC: CPT

## 2022-04-10 PROCEDURE — 94640 AIRWAY INHALATION TREATMENT: CPT

## 2022-04-10 PROCEDURE — 93010 ELECTROCARDIOGRAM REPORT: CPT | Mod: ,,, | Performed by: INTERNAL MEDICINE

## 2022-04-10 PROCEDURE — 99233 PR SUBSEQUENT HOSPITAL CARE,LEVL III: ICD-10-PCS | Mod: GC,,, | Performed by: INTERNAL MEDICINE

## 2022-04-10 PROCEDURE — 25000003 PHARM REV CODE 250

## 2022-04-10 RX ORDER — FAMOTIDINE 20 MG/1
20 TABLET, FILM COATED ORAL EVERY 6 HOURS
Status: COMPLETED | OUTPATIENT
Start: 2022-04-10 | End: 2022-04-11

## 2022-04-10 RX ORDER — PREDNISONE 50 MG/1
50 TABLET ORAL EVERY 6 HOURS
Status: COMPLETED | OUTPATIENT
Start: 2022-04-10 | End: 2022-04-11

## 2022-04-10 RX ORDER — DIPHENHYDRAMINE HCL 50 MG
50 CAPSULE ORAL EVERY 6 HOURS
Status: COMPLETED | OUTPATIENT
Start: 2022-04-10 | End: 2022-04-11

## 2022-04-10 RX ORDER — DICLOFENAC SODIUM 10 MG/G
4 GEL TOPICAL 3 TIMES DAILY PRN
Status: DISCONTINUED | OUTPATIENT
Start: 2022-04-10 | End: 2022-04-22 | Stop reason: HOSPADM

## 2022-04-10 RX ADMIN — PREDNISONE 50 MG: 50 TABLET ORAL at 05:04

## 2022-04-10 RX ADMIN — FUROSEMIDE 40 MG: 40 TABLET ORAL at 09:04

## 2022-04-10 RX ADMIN — ONDANSETRON 4 MG: 2 INJECTION INTRAMUSCULAR; INTRAVENOUS at 01:04

## 2022-04-10 RX ADMIN — TIOTROPIUM BROMIDE INHALATION SPRAY 2 PUFF: 3.12 SPRAY, METERED RESPIRATORY (INHALATION) at 08:04

## 2022-04-10 RX ADMIN — ISOSORBIDE MONONITRATE 30 MG: 30 TABLET, EXTENDED RELEASE ORAL at 09:04

## 2022-04-10 RX ADMIN — LISINOPRIL 10 MG: 10 TABLET ORAL at 09:04

## 2022-04-10 RX ADMIN — PANTOPRAZOLE SODIUM 40 MG: 40 TABLET, DELAYED RELEASE ORAL at 09:04

## 2022-04-10 RX ADMIN — CHOLESTYRAMINE 4 G: 4 POWDER, FOR SUSPENSION ORAL at 09:04

## 2022-04-10 RX ADMIN — TRIAMCINOLONE ACETONIDE: 1 CREAM TOPICAL at 09:04

## 2022-04-10 RX ADMIN — DICLOFENAC 4 G: 10 GEL TOPICAL at 01:04

## 2022-04-10 RX ADMIN — ASPIRIN 81 MG: 81 TABLET, COATED ORAL at 06:04

## 2022-04-10 RX ADMIN — CHOLESTYRAMINE 4 G: 4 POWDER, FOR SUSPENSION ORAL at 08:04

## 2022-04-10 RX ADMIN — IPRATROPIUM BROMIDE AND ALBUTEROL SULFATE 3 ML: 2.5; .5 SOLUTION RESPIRATORY (INHALATION) at 12:04

## 2022-04-10 RX ADMIN — FAMOTIDINE 20 MG: 20 TABLET ORAL at 05:04

## 2022-04-10 RX ADMIN — AMLODIPINE BESYLATE 10 MG: 10 TABLET ORAL at 09:04

## 2022-04-10 RX ADMIN — TICAGRELOR 90 MG: 90 TABLET ORAL at 08:04

## 2022-04-10 RX ADMIN — Medication 100 MG: at 06:04

## 2022-04-10 RX ADMIN — METOPROLOL SUCCINATE 50 MG: 50 TABLET, EXTENDED RELEASE ORAL at 09:04

## 2022-04-10 RX ADMIN — DIPHENHYDRAMINE HYDROCHLORIDE 50 MG: 50 CAPSULE ORAL at 05:04

## 2022-04-10 RX ADMIN — TRIAMCINOLONE ACETONIDE: 1 CREAM TOPICAL at 08:04

## 2022-04-10 RX ADMIN — FLUTICASONE FUROATE AND VILANTEROL TRIFENATATE 1 PUFF: 100; 25 POWDER RESPIRATORY (INHALATION) at 08:04

## 2022-04-10 RX ADMIN — TICAGRELOR 90 MG: 90 TABLET ORAL at 09:04

## 2022-04-10 RX ADMIN — GABAPENTIN 100 MG: 100 CAPSULE ORAL at 08:04

## 2022-04-10 RX ADMIN — ACETAMINOPHEN 650 MG: 325 TABLET ORAL at 02:04

## 2022-04-10 RX ADMIN — Medication 1 CAPSULE: at 06:04

## 2022-04-10 RX ADMIN — ATORVASTATIN CALCIUM 80 MG: 20 TABLET, FILM COATED ORAL at 09:04

## 2022-04-10 RX ADMIN — FERROUS SULFATE TAB 325 MG (65 MG ELEMENTAL FE) 1 EACH: 325 (65 FE) TAB at 09:04

## 2022-04-10 NOTE — PROGRESS NOTES
1745: Notified MD. Garza pt c/o N/V again, bloated ,and indigestion. Zofran is Q8hr and given at 1311 already. /62, hr 70S, SAT o2 96%.  1820 after scheduled pepcid and warm water given, pt stated feel much better.

## 2022-04-10 NOTE — ASSESSMENT & PLAN NOTE
Acute onset chest pressure, EKG with lateral ST depressions, and troponin 0.06 (mildly elevated) all consistent with NSTEMI. Currently stable and asymptomatic. 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 Outpt plan to follow-up with Dr Chappell for high risk PCI. Troponin peaked at 0.07.     - Start ACS reloaded with ASA/Brilinta, will not continue heparin ggt as pt has hx of rectus sheath hematoma during last L heart cath.   - Continue atorvastatin, metoprolol  - f/u Echo  - SL Nitro PRN for chest pain  - Interventional Cardiology consult for high risk PCI early next week with Dr. Chappell  - will order steroid dosing for contrast allergy  - NPO at 0000

## 2022-04-10 NOTE — PROGRESS NOTES
Notified MD. Argueta pt feel nauseated, and threw up some sputum. Pt denies sob or cp.  Zofran given and EKG ordered.

## 2022-04-10 NOTE — ASSESSMENT & PLAN NOTE
On NC at home and underlying COPD. No s/s exacerbation.    - Continue home inhaler regimen  - Albuterol nebs PRN. Will administer Albuterol treatment today 2/2 cough and wheezing in chest (chronic issue)  - Supplemental oxygen

## 2022-04-10 NOTE — SUBJECTIVE & OBJECTIVE
Interval History: NAEON, pt with some LE and L shoulder arthritis type pain that is chronic. Voltaren gel prescribed.     Review of Systems   Constitutional: Negative for diaphoresis, fever and weight gain.   HENT: Negative.     Cardiovascular:  Negative for chest pain, dyspnea on exertion, leg swelling and palpitations.   Respiratory:  Negative for cough, shortness of breath and wheezing.    Skin: Negative.    Musculoskeletal: Negative.    Gastrointestinal:  Negative for constipation, diarrhea, melena, nausea and vomiting.   Genitourinary: Negative.    Neurological:  Negative for dizziness and light-headedness.   Psychiatric/Behavioral: Negative.     Objective:     Vital Signs (Most Recent):  Temp: 97.6 °F (36.4 °C) (04/10/22 1154)  Pulse: 69 (04/10/22 1154)  Resp: 18 (04/10/22 1154)  BP: 135/60 (04/10/22 1154)  SpO2: 97 % (04/10/22 1154) Vital Signs (24h Range):  Temp:  [97.6 °F (36.4 °C)-98.7 °F (37.1 °C)] 97.6 °F (36.4 °C)  Pulse:  [65-84] 69  Resp:  [18-20] 18  SpO2:  [93 %-98 %] 97 %  BP: (126-164)/(60-76) 135/60     Weight: 78.7 kg (173 lb 6.3 oz)  Body mass index is 30.71 kg/m².     SpO2: 97 %  O2 Device (Oxygen Therapy): nasal cannula      Intake/Output Summary (Last 24 hours) at 4/10/2022 1201  Last data filed at 4/10/2022 0800  Gross per 24 hour   Intake 622 ml   Output 2400 ml   Net -1778 ml       Lines/Drains/Airways       Drain  Duration             Female External Urinary Catheter 03/27/22 2000 13 days              Peripheral Intravenous Line  Duration                  Peripheral IV - Single Lumen 04/07/22 1300 20 G Anterior;Left Forearm 2 days                    Physical Exam  Vitals and nursing note reviewed.   Constitutional:       Appearance: Normal appearance.   HENT:      Head: Normocephalic and atraumatic.      Nose: Nose normal.      Mouth/Throat:      Mouth: Mucous membranes are moist.      Pharynx: Oropharynx is clear.   Eyes:      Extraocular Movements: Extraocular movements intact.    Cardiovascular:      Rate and Rhythm: Normal rate and regular rhythm.      Pulses: Normal pulses.      Heart sounds: Normal heart sounds.   Pulmonary:      Effort: Pulmonary effort is normal.      Breath sounds: Normal breath sounds. No wheezing or rales.   Abdominal:      General: Bowel sounds are normal.      Palpations: Abdomen is soft.      Tenderness: There is no abdominal tenderness.      Hernia: A hernia (R hernia abdominal hernia, reducable, non-painful) is present.   Musculoskeletal:         General: Normal range of motion.      Cervical back: Normal range of motion and neck supple.      Right lower leg: No edema.      Left lower leg: No edema.   Skin:     General: Skin is warm and dry.   Neurological:      General: No focal deficit present.      Mental Status: She is alert and oriented to person, place, and time.   Psychiatric:         Mood and Affect: Mood normal.         Behavior: Behavior normal.     Significant Labs: All pertinent lab results from the last 24 hours have been reviewed.    Significant Imaging: Echocardiogram: Transthoracic echo (TTE) complete (Cupid Only):   Results for orders placed or performed during the hospital encounter of 04/08/22   Echo   Result Value Ref Range    LVIDd 3.37 (A) 3.5 - 6.0 cm    LVIDs 2.36 2.1 - 4.0 cm    LV Diastolic Volume 46.49 mL    LV Systolic Volume 19.23 mL    FS 30 %    LV Systolic Volume Index 10.6 mL/m2    LV Diastolic Volume Index 25.54 mL/m2    BSA 1.87 m2    EF 60 %    Narrative    · Limited study for wall morgan. several off axis views.  · The estimated ejection fraction is 60%.  · The left ventricle is normal in size with normal systolic function.  · The following segments are hypokinetic: basal inferoseptal and basal   inferior. All other segments are normal.  · Normal right ventricular size with normal right ventricular systolic   function.

## 2022-04-10 NOTE — ASSESSMENT & PLAN NOTE
Normal EF 2/2022 and no s/s of exacerbation. TTE on 04/09 with EF 60, hypokinetic basal inferoseptal and inferior hypokinesis.    - Continue home furosemide 40mg qd  - Daily weights, strict I/Os  - Not on MRA or SLGT2; can consider as outpt

## 2022-04-10 NOTE — PROGRESS NOTES
[facility] Step[facility]  Cardiology  Progress Note    Patient Name: Marisol Kerr  MRN: [id number]  Admission Date: 4/8/2022  Hospital Length of Stay: 2 days  Code Status: Full Code   Attending Physician: Karl Ontiveros MD   Primary Care Physician: Khadar Dwyer MD  Expected Discharge Date: 4/12/2022  Principal Problem:NSTEMI (non-ST elevated myocardial infarction)    Subjective:     Hospital Course:   Pt asymptomatic on DAPT and Heparin. Heparin Dc'd upon arrival in setting of hx of rectus sheath hematoma managed by IR after last L heart cath. Euvolemic and managed on home Lasix. IC planned intervention on Monday with Dr. Chappell unless pt clinically declines and needs emergent cath over the weekend.       Interval History: NAEON, pt with some LE and L shoulder arthritis type pain that is chronic. Voltaren gel prescribed.     Review of Systems   Constitutional: Negative for diaphoresis, fever and weight gain.   HENT: Negative.     Cardiovascular:  Negative for chest pain, dyspnea on exertion, leg swelling and palpitations.   Respiratory:  Negative for cough, shortness of breath and wheezing.    Skin: Negative.    Musculoskeletal: Negative.    Gastrointestinal:  Negative for constipation, diarrhea, melena, nausea and vomiting.   Genitourinary: Negative.    Neurological:  Negative for dizziness and light-headedness.   Psychiatric/Behavioral: Negative.     Objective:     Vital Signs (Most Recent):  Temp: 97.6 °F (36.4 °C) (04/10/22 1154)  Pulse: 69 (04/10/22 1154)  Resp: 18 (04/10/22 1154)  BP: 135/60 (04/10/22 1154)  SpO2: 97 % (04/10/22 1154) Vital Signs (24h Range):  Temp:  [97.6 °F (36.4 °C)-98.7 °F (37.1 °C)] 97.6 °F (36.4 °C)  Pulse:  [65-84] 69  Resp:  [18-20] 18  SpO2:  [93 %-98 %] 97 %  BP: (126-164)/(60-76) 135/60     Weight: 78.7 kg (173 lb 6.3 oz)  Body mass index is 30.71 kg/m².     SpO2: 97 %  O2 Device (Oxygen Therapy): nasal cannula      Intake/Output Summary (Last 24 hours) at 4/10/2022  1201  Last data filed at 4/10/2022 0800  Gross per 24 hour   Intake 622 ml   Output 2400 ml   Net -1778 ml       Lines/Drains/Airways       Drain  Duration             Female External Urinary Catheter 03/27/22 2000 13 days              Peripheral Intravenous Line  Duration                  Peripheral IV - Single Lumen 04/07/22 1300 20 G Anterior;Left Forearm 2 days                    Physical Exam  Vitals and nursing note reviewed.   Constitutional:       Appearance: Normal appearance.   HENT:      Head: Normocephalic and atraumatic.      Nose: Nose normal.      Mouth/Throat:      Mouth: Mucous membranes are moist.      Pharynx: Oropharynx is clear.   Eyes:      Extraocular Movements: Extraocular movements intact.   Cardiovascular:      Rate and Rhythm: Normal rate and regular rhythm.      Pulses: Normal pulses.      Heart sounds: Normal heart sounds.   Pulmonary:      Effort: Pulmonary effort is normal.      Breath sounds: Normal breath sounds. No wheezing or rales.   Abdominal:      General: Bowel sounds are normal.      Palpations: Abdomen is soft.      Tenderness: There is no abdominal tenderness.      Hernia: A hernia (R hernia abdominal hernia, reducable, non-painful) is present.   Musculoskeletal:         General: Normal range of motion.      Cervical back: Normal range of motion and neck supple.      Right lower leg: No edema.      Left lower leg: No edema.   Skin:     General: Skin is warm and dry.   Neurological:      General: No focal deficit present.      Mental Status: She is alert and oriented to person, place, and time.   Psychiatric:         Mood and Affect: Mood normal.         Behavior: Behavior normal.     Significant Labs: All pertinent lab results from the last 24 hours have been reviewed.    Significant Imaging: Echocardiogram: Transthoracic echo (TTE) complete (Cupid Only):   Results for orders placed or performed during the hospital encounter of 04/08/22   Echo   Result Value Ref Range     LVIDd 3.37 (A) 3.5 - 6.0 cm    LVIDs 2.36 2.1 - 4.0 cm    LV Diastolic Volume 46.49 mL    LV Systolic Volume 19.23 mL    FS 30 %    LV Systolic Volume Index 10.6 mL/m2    LV Diastolic Volume Index 25.54 mL/m2    BSA 1.87 m2    EF 60 %    Narrative    · Limited study for wall morgan. several off axis views.  · The estimated ejection fraction is 60%.  · The left ventricle is normal in size with normal systolic function.  · The following segments are hypokinetic: basal inferoseptal and basal   inferior. All other segments are normal.  · Normal right ventricular size with normal right ventricular systolic   function.        Assessment and Plan:       * NSTEMI (non-ST elevated myocardial infarction)  Acute onset chest pressure, EKG with lateral ST depressions, and troponin 0.06 (mildly elevated) all consistent with NSTEMI. Currently stable and asymptomatic. 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 Outpt plan to follow-up with Dr Chappell for high risk PCI. Troponin peaked at 0.07.     - Start ACS reloaded with ASA/Brilinta, will not continue heparin ggt as pt has hx of rectus sheath hematoma during last L heart cath.   - Continue atorvastatin, metoprolol  - f/u Echo  - SL Nitro PRN for chest pain  - Interventional Cardiology consult for high risk PCI early next week with Dr. Chappell  - will order steroid dosing for contrast allergy  - NPO at 0000    Normocytic anemia  Hb 9.8 on arrival and at baseline. Prior rectus sheath hematoma after angiogram 2/2022 requiring inferior epigastric artery embolization. No s/s bleeding and on DAPT.     - Continue home ferrous sulfate  - Type and screen for potential procedure  - Trend CBC daily  - Transfuse to maintain Hb >8 with CAD    Chronic diastolic congestive heart failure  Normal EF 2/2022 and no s/s of exacerbation. TTE on 04/09 with EF 60, hypokinetic basal inferoseptal and inferior hypokinesis.    - Continue home furosemide  40mg qd  - Daily weights, strict I/Os  - Not on MRA or SLGT2; can consider as outpt      Chronic hypoxemic respiratory failure  On NC at home and underlying COPD. No s/s exacerbation.    - Continue home inhaler regimen  - Albuterol nebs PRN. Will administer Albuterol treatment today 2/2 cough and wheezing in chest (chronic issue)  - Supplemental oxygen    CKD (chronic kidney disease), stage III  Cr 1.3 on arrival with baseline 1.3-1.6.    - Continue home lisinopril  - Trend BMP, Mag, Phos  - Strict I/Os  - Avoid nephrotoxins    Gastroesophageal reflux disease without esophagitis  - Continue home PPI    Essential hypertension, benign  - Continue home amlodipine, lisinopril        VTE Risk Mitigation (From admission, onward)         Ordered     IP VTE HIGH RISK PATIENT  Once         04/08/22 2203     Place sequential compression device  Until discontinued         04/08/22 2203                Amari Argueta MD  Cardiology  [facility]

## 2022-04-10 NOTE — PLAN OF CARE
BG monitored. Pt up in the chair during the day. Pt educated on fall risk and remained free from falls/trauma/injury. Denies chest pain, SOB, palpitations, dizziness, pain, or discomfort. Plan of care reviewed with pt, all questions answered. Bed locked in lowest position, call bell within reach, no acute distress noted, will continue to monitor.

## 2022-04-11 ENCOUNTER — EXTERNAL HOME HEALTH (OUTPATIENT)
Dept: HOME HEALTH SERVICES | Facility: HOSPITAL | Age: 77
End: 2022-04-11
Payer: MEDICARE

## 2022-04-11 LAB
ALBUMIN SERPL BCP-MCNC: 2.8 G/DL (ref 3.5–5.2)
ALP SERPL-CCNC: 78 U/L (ref 55–135)
ALT SERPL W/O P-5'-P-CCNC: 9 U/L (ref 10–44)
ANION GAP SERPL CALC-SCNC: 10 MMOL/L (ref 8–16)
ANISOCYTOSIS BLD QL SMEAR: SLIGHT
AST SERPL-CCNC: 12 U/L (ref 10–40)
BASOPHILS # BLD AUTO: 0.02 K/UL (ref 0–0.2)
BASOPHILS NFR BLD: 0.2 % (ref 0–1.9)
BILIRUB SERPL-MCNC: 0.5 MG/DL (ref 0.1–1)
BUN SERPL-MCNC: 43 MG/DL (ref 8–23)
CALCIUM SERPL-MCNC: 9.7 MG/DL (ref 8.7–10.5)
CHLORIDE SERPL-SCNC: 105 MMOL/L (ref 95–110)
CO2 SERPL-SCNC: 26 MMOL/L (ref 23–29)
CREAT SERPL-MCNC: 1.7 MG/DL (ref 0.5–1.4)
DIFFERENTIAL METHOD: ABNORMAL
EOSINOPHIL # BLD AUTO: 0 K/UL (ref 0–0.5)
EOSINOPHIL NFR BLD: 0 % (ref 0–8)
ERYTHROCYTE [DISTWIDTH] IN BLOOD BY AUTOMATED COUNT: 13.1 % (ref 11.5–14.5)
EST. GFR  (AFRICAN AMERICAN): 33.8 ML/MIN/1.73 M^2
EST. GFR  (NON AFRICAN AMERICAN): 29.3 ML/MIN/1.73 M^2
GLUCOSE SERPL-MCNC: 190 MG/DL (ref 70–110)
HCT VFR BLD AUTO: 33.7 % (ref 37–48.5)
HGB BLD-MCNC: 10.4 G/DL (ref 12–16)
IMM GRANULOCYTES # BLD AUTO: 0.03 K/UL (ref 0–0.04)
IMM GRANULOCYTES NFR BLD AUTO: 0.4 % (ref 0–0.5)
LYMPHOCYTES # BLD AUTO: 0.5 K/UL (ref 1–4.8)
LYMPHOCYTES NFR BLD: 5.9 % (ref 18–48)
MAGNESIUM SERPL-MCNC: 2.4 MG/DL (ref 1.6–2.6)
MCH RBC QN AUTO: 28.7 PG (ref 27–31)
MCHC RBC AUTO-ENTMCNC: 30.9 G/DL (ref 32–36)
MCV RBC AUTO: 93 FL (ref 82–98)
MONOCYTES # BLD AUTO: 0.1 K/UL (ref 0.3–1)
MONOCYTES NFR BLD: 1.2 % (ref 4–15)
NEUTROPHILS # BLD AUTO: 7.5 K/UL (ref 1.8–7.7)
NEUTROPHILS NFR BLD: 92.3 % (ref 38–73)
NRBC BLD-RTO: 0 /100 WBC
PLATELET # BLD AUTO: 151 K/UL (ref 150–450)
PLATELET BLD QL SMEAR: ABNORMAL
PMV BLD AUTO: 12.1 FL (ref 9.2–12.9)
POCT GLUCOSE: 158 MG/DL (ref 70–110)
POCT GLUCOSE: 170 MG/DL (ref 70–110)
POCT GLUCOSE: 171 MG/DL (ref 70–110)
POCT GLUCOSE: 211 MG/DL (ref 70–110)
POCT GLUCOSE: 215 MG/DL (ref 70–110)
POIKILOCYTOSIS BLD QL SMEAR: SLIGHT
POTASSIUM SERPL-SCNC: 5.1 MMOL/L (ref 3.5–5.1)
PROT SERPL-MCNC: 6.3 G/DL (ref 6–8.4)
RBC # BLD AUTO: 3.62 M/UL (ref 4–5.4)
SODIUM SERPL-SCNC: 141 MMOL/L (ref 136–145)
WBC # BLD AUTO: 8.08 K/UL (ref 3.9–12.7)

## 2022-04-11 PROCEDURE — 80053 COMPREHEN METABOLIC PANEL: CPT

## 2022-04-11 PROCEDURE — 25000003 PHARM REV CODE 250

## 2022-04-11 PROCEDURE — 99233 PR SUBSEQUENT HOSPITAL CARE,LEVL III: ICD-10-PCS | Mod: GC,,, | Performed by: INTERNAL MEDICINE

## 2022-04-11 PROCEDURE — 94640 AIRWAY INHALATION TREATMENT: CPT

## 2022-04-11 PROCEDURE — 20600001 HC STEP DOWN PRIVATE ROOM

## 2022-04-11 PROCEDURE — 25000003 PHARM REV CODE 250: Performed by: INTERNAL MEDICINE

## 2022-04-11 PROCEDURE — 85025 COMPLETE CBC W/AUTO DIFF WBC: CPT

## 2022-04-11 PROCEDURE — 63600175 PHARM REV CODE 636 W HCPCS

## 2022-04-11 PROCEDURE — 94761 N-INVAS EAR/PLS OXIMETRY MLT: CPT

## 2022-04-11 PROCEDURE — 83735 ASSAY OF MAGNESIUM: CPT | Performed by: INTERNAL MEDICINE

## 2022-04-11 PROCEDURE — 27000221 HC OXYGEN, UP TO 24 HOURS

## 2022-04-11 PROCEDURE — 25000003 PHARM REV CODE 250: Performed by: STUDENT IN AN ORGANIZED HEALTH CARE EDUCATION/TRAINING PROGRAM

## 2022-04-11 PROCEDURE — 36415 COLL VENOUS BLD VENIPUNCTURE: CPT | Performed by: INTERNAL MEDICINE

## 2022-04-11 PROCEDURE — 99233 SBSQ HOSP IP/OBS HIGH 50: CPT | Mod: GC,,, | Performed by: INTERNAL MEDICINE

## 2022-04-11 RX ADMIN — FAMOTIDINE 20 MG: 20 TABLET ORAL at 12:04

## 2022-04-11 RX ADMIN — ISOSORBIDE MONONITRATE 30 MG: 30 TABLET, EXTENDED RELEASE ORAL at 08:04

## 2022-04-11 RX ADMIN — PREDNISONE 50 MG: 50 TABLET ORAL at 12:04

## 2022-04-11 RX ADMIN — ATORVASTATIN CALCIUM 80 MG: 20 TABLET, FILM COATED ORAL at 08:04

## 2022-04-11 RX ADMIN — DIPHENHYDRAMINE HYDROCHLORIDE 50 MG: 50 CAPSULE ORAL at 12:04

## 2022-04-11 RX ADMIN — Medication 100 MG: at 06:04

## 2022-04-11 RX ADMIN — LISINOPRIL 10 MG: 10 TABLET ORAL at 08:04

## 2022-04-11 RX ADMIN — AMLODIPINE BESYLATE 10 MG: 10 TABLET ORAL at 08:04

## 2022-04-11 RX ADMIN — ACETAMINOPHEN 650 MG: 325 TABLET ORAL at 09:04

## 2022-04-11 RX ADMIN — BENZONATATE 100 MG: 100 CAPSULE, LIQUID FILLED ORAL at 09:04

## 2022-04-11 RX ADMIN — PREDNISONE 50 MG: 50 TABLET ORAL at 06:04

## 2022-04-11 RX ADMIN — TICAGRELOR 90 MG: 90 TABLET ORAL at 09:04

## 2022-04-11 RX ADMIN — TRIAMCINOLONE ACETONIDE: 1 CREAM TOPICAL at 08:04

## 2022-04-11 RX ADMIN — INSULIN ASPART 2 UNITS: 100 INJECTION, SOLUTION INTRAVENOUS; SUBCUTANEOUS at 04:04

## 2022-04-11 RX ADMIN — PANTOPRAZOLE SODIUM 40 MG: 40 TABLET, DELAYED RELEASE ORAL at 08:04

## 2022-04-11 RX ADMIN — GABAPENTIN 100 MG: 100 CAPSULE ORAL at 09:04

## 2022-04-11 RX ADMIN — TRIAMCINOLONE ACETONIDE: 1 CREAM TOPICAL at 09:04

## 2022-04-11 RX ADMIN — TIOTROPIUM BROMIDE INHALATION SPRAY 2 PUFF: 3.12 SPRAY, METERED RESPIRATORY (INHALATION) at 08:04

## 2022-04-11 RX ADMIN — FERROUS SULFATE TAB 325 MG (65 MG ELEMENTAL FE) 1 EACH: 325 (65 FE) TAB at 08:04

## 2022-04-11 RX ADMIN — FUROSEMIDE 40 MG: 40 TABLET ORAL at 08:04

## 2022-04-11 RX ADMIN — CHOLESTYRAMINE 4 G: 4 POWDER, FOR SUSPENSION ORAL at 08:04

## 2022-04-11 RX ADMIN — FAMOTIDINE 20 MG: 20 TABLET ORAL at 06:04

## 2022-04-11 RX ADMIN — ZOLPIDEM TARTRATE 5 MG: 5 TABLET ORAL at 09:04

## 2022-04-11 RX ADMIN — WHITE PETROLATUM: 1.75 OINTMENT TOPICAL at 11:04

## 2022-04-11 RX ADMIN — ASPIRIN 81 MG: 81 TABLET, COATED ORAL at 06:04

## 2022-04-11 RX ADMIN — DIPHENHYDRAMINE HYDROCHLORIDE 50 MG: 50 CAPSULE ORAL at 06:04

## 2022-04-11 RX ADMIN — FLUTICASONE FUROATE AND VILANTEROL TRIFENATATE 1 PUFF: 100; 25 POWDER RESPIRATORY (INHALATION) at 08:04

## 2022-04-11 RX ADMIN — Medication 1 CAPSULE: at 06:04

## 2022-04-11 RX ADMIN — METOPROLOL SUCCINATE 50 MG: 50 TABLET, EXTENDED RELEASE ORAL at 08:04

## 2022-04-11 RX ADMIN — TICAGRELOR 90 MG: 90 TABLET ORAL at 08:04

## 2022-04-11 RX ADMIN — WHITE PETROLATUM: 1.75 OINTMENT TOPICAL at 09:04

## 2022-04-11 NOTE — PT/OT/SLP PROGRESS
Physical Therapy      Patient Name:  Marisol Kerr   MRN:  [id number]    Patient not seen today secondary to Patient unwilling to participate, Pain.Patient reporting pain in buttocks, dissatisfaction with NPO status- awaiting procedure, fatigue. Increased encouragement provided, educated on benefits of mobility, consequences of immobility. Patient continued to refuse.  Will follow-up as appropriate.

## 2022-04-11 NOTE — CONSULTS
[facility]  Interventional Cardiology  Consult Note    Patient Name: Marisol Kerr  MRN: [id number]  Admission Date: 4/8/2022  Hospital Length of Stay: 3 days  Code Status: Full Code   Attending Provider: Karl Ontiveros MD  Consulting Provider: Zoran Martin MD  Primary Care Physician: Khadar Dwyer MD  Principal Problem:NSTEMI (non-ST elevated myocardial infarction)    Patient information was obtained from patient, past medical records and ER records.     Inpatient consult to Interventional Cardiology  Consult performed by: Zoran Martin MD  Consult ordered by: García Johnson MD  Reason for consult: NSTEMI  Assessment/Recommendations: Please see A/P        Subjective:     Reason for consult: NSTEMI     HPI:  Patient is a 76 year old female with PMHx of T2DM, HTN, HLD, CKD, COPD on home O2 who presents SNF following hospitalization for NSTEMI.  She was discharged from skilled nursing facility on April 1st.  Patient initially presented in 02/2022 to [facility] for a left heart catheterization to evaluate recently increasing episodes of angina requiring more frequent Nitroglycerin use. She was transferred to [facility] for further evaluation over concerns of lateral precordial lead ST depressions and potential emergent intervention. LHC at outside hospital was notable for significant coronary artery disease. Patient admitted to CCU while pending evaluation for ACS intervention, given her NSTEMI. CTS evaluated patient and did not recommend CABG in light of significant debility. Patient developed significant abdominal pain overnight and distended abdomen with mass, for which Interventional deferred LHC. CT of the abdomen with contrast showed a large rectus sheath hematoma. IR consulted and performed embolization of the left inferior epigastric artery. Patient's Cr elevated to 1.9 likely due to contrast and Hgb dropped requiring transfusion of 2 units RBC, leading to Hgb increase to 9.2  g/dL. IC recommended that since patient had a recent bleed, would defer procedure for outpatient in 1-2 weeks.  During her stay in skilled nursing facility she did not have chest pain. She has had cold-like symptoms running of nose and cough.  On 04/07/2022, patient developed severe chest pain relieved with sublingual nitroglycerin.  During the night she had recurrence symptoms.  Troponin 0.058 --> 0.066 --> 0.072 (peak) --> 0.055. Transferred from [facility] for evaluation of  distal RCA. ECGs on 04/08/2022 and 04/10/2022 without any significant ST-T wave changes from baseline. Started on heparin drip but was discontinued within 12 hours. Loaded with ASA and Brilinta. Noted Iodine contrast allergy and thus started on contrast allergy prep in anticipation for catheterization. She denies any chest discomfort at [facility] thus far. Reports productive cough x 1 week with green sputum and fever blisters.     Angiogram 02/10/2022 from [facility]: 60% pLAD stenosis, tandem LCx stenosis (80% prox, 70% distal), 90% mRCA stenosis and occluded distal RCA stenosis with patent R to L collateral, 100% stenosed PRAV, and 100% RPDA stenosis.      Past Medical History:   Diagnosis Date    CAD (coronary artery disease)     Cancer     colon    CHF (congestive heart failure)     Colitis     Colon cancer     COPD (chronic obstructive pulmonary disease)     Decreased hearing     Diabetes mellitus     Diabetes mellitus, type 2     Hypercholesterolemia     Hypertension     Hypoxemia     Insomnia     Obesity     Osteoarthritis        Past Surgical History:   Procedure Laterality Date    ANGIOGRAM, CORONARY, WITH LEFT HEART CATHETERIZATION N/A 2/10/2022    Procedure: Angiogram, Coronary, with Left Heart Cath;  Surgeon: Cristian Benjamin MD;  Location: [facility] CATH/EP LAB;  Service: Cardiology;  Laterality: N/A;    CARDIAC CATHETERIZATION      CHOLECYSTECTOMY      COLECTOMY      CORONARY ANGIOPLASTY      CORONARY STENT  PLACEMENT      EXTERNAL EAR SURGERY      EYE SURGERY      FLEXIBLE SIGMOIDOSCOPY N/A 9/19/2019    Procedure: SIGMOIDOSCOPY, FLEXIBLE;  Surgeon: Biju Kramer III, MD;  Location: [facility];  Service: Endoscopy;  Laterality: N/A;    HYSTERECTOMY      partial       Review of patient's allergies indicates:   Allergen Reactions    Iodinated contrast media     Strawberries [strawberry] Hives and Itching       PTA Medications   Medication Sig    acetaminophen (TYLENOL) 325 MG tablet Take 2 tablets (650 mg total) by mouth every 4 (four) hours as needed.    albuterol (VENTOLIN HFA) 90 mcg/actuation inhaler Inhale 2 puffs into the lungs every 6 (six) hours as needed for Wheezing or Shortness of Breath. Rescue    aspirin (ECOTRIN) 81 MG EC tablet Take 81 mg by mouth every morning.     atorvastatin (LIPITOR) 80 MG tablet Take 80 mg by mouth once daily.    ergocalciferol (VITAMIN D2) 50,000 unit Cap Take 1 capsule (50,000 Units total) by mouth every 7 days.    ferrous sulfate 325 (65 FE) MG EC tablet Take 1 tablet (325 mg total) by mouth once daily.    fish oil-omega-3 fatty acids 300-1,000 mg capsule Take 1 capsule by mouth every morning.    ipratropium-albuteroL (COMBIVENT RESPIMAT)  mcg/actuation inhaler Inhale 2 puffs into the lungs every 4 (four) hours as needed for Shortness of Breath. Rescue    metoprolol succinate (TOPROL-XL) 50 MG 24 hr tablet Take 1 tablet (50 mg total) by mouth once daily.    nitroGLYCERIN 0.4 MG/DOSE TL SPRY (NITROLINGUAL) 400 mcg/spray spray USE ONE SPRAY UNDER THE TONGUE EVERY 5 MINUTES AS NEEDED FOR CHEST PAIN.  DO NOT EXCEED A TOTAL OF 3 SPRAYS IN 15 MINUTES    pantoprazole (PROTONIX) 40 MG tablet Take 1 tablet (40 mg total) by mouth once daily.    triamcinolone acetonide 0.1% (KENALOG) 0.1 % cream Apply topically 2 (two) times daily. For legs.    amLODIPine (NORVASC) 10 MG tablet Take 1 tablet (10 mg total) by mouth once daily.    cholestyramine (QUESTRAN) 4 gram  packet Take 1 packet (4 g total) by mouth 2 (two) times daily.    coenzyme Q10 100 mg capsule Take 100 mg by mouth every morning.    fluticasone-salmeterol diskus inhaler 250-50 mcg Inhale 1 puff into the lungs 2 (two) times daily.    furosemide (LASIX) 20 MG tablet Take 2 tablets (40 mg total) by mouth once daily.    gabapentin (NEURONTIN) 100 MG capsule Take 1 capsule (100 mg total) by mouth every evening.    isosorbide mononitrate (IMDUR) 30 MG 24 hr tablet Take 1 tablet (30 mg total) by mouth once daily.    lisinopriL 10 MG tablet Take 1 tablet (10 mg total) by mouth once daily. HOLD UNTIL OTHERWISE DIRECTED BY PRIMARY CARE PROVIDER    NARCAN 4 mg/actuation Spry     nitroGLYCERIN 0.2 mg/hr TD PT24 (NITRODUR) 0.2 mg/hr APPLY 1 PATCH TOPICALLY ONCE DAILY TO LEG.    ondansetron (ZOFRAN-ODT) 4 MG TbDL Take 2 tablets (8 mg total) by mouth every 6 (six) hours as needed.    temazepam (RESTORIL) 15 mg Cap Take 15 mg by mouth nightly as needed.    umeclidinium (INCRUSE ELLIPTA) 62.5 mcg/actuation inhalation capsule Inhale 62.5 mcg into the lungs every morning. Controller     Family History       Problem Relation (Age of Onset)    Febrile seizures Mother    Heart failure Father          Tobacco Use    Smoking status: Former Smoker     Packs/day: 3.00     Years: 50.00     Pack years: 150.00     Types: Cigarettes     Start date: 3/30/1964     Quit date: 2006     Years since quittin.1    Smokeless tobacco: Never Used    Tobacco comment: ex smoker 15 years   Substance and Sexual Activity    Alcohol use: Yes    Drug use: No    Sexual activity: Never     Review of Systems   Constitutional: Negative for chills and fever.        Fever blister bleeding, nausea overnight   Cardiovascular:  Negative for chest pain, orthopnea and palpitations.   Respiratory:  Positive for cough (for 1 week now with green sputum). Negative for shortness of breath.    Gastrointestinal:  Negative for abdominal pain.    Neurological:  Negative for dizziness, headaches and light-headedness.   Psychiatric/Behavioral:  Negative for altered mental status.    Objective:     Vital Signs (Most Recent):  Temp: 97.8 °F (36.6 °C) (04/11/22 0742)  Pulse: 87 (04/11/22 0742)  Resp: 18 (04/11/22 0742)  BP: (!) 144/65 (04/11/22 0742)  SpO2: (!) 94 % (04/11/22 0742)   Vital Signs (24h Range):  Temp:  [97.6 °F (36.4 °C)-98.7 °F (37.1 °C)] 97.8 °F (36.6 °C)  Pulse:  [59-88] 87  Resp:  [16-20] 18  SpO2:  [94 %-97 %] 94 %  BP: (120-149)/(58-76) 144/65     Weight: 78.7 kg (173 lb 6.3 oz)  Body mass index is 30.71 kg/m².    SpO2: (!) 94 %  O2 Device (Oxygen Therapy): nasal cannula      Intake/Output Summary (Last 24 hours) at 4/11/2022 0745  Last data filed at 4/11/2022 0600  Gross per 24 hour   Intake 662 ml   Output 2250 ml   Net -1588 ml       Lines/Drains/Airways       Drain  Duration             Female External Urinary Catheter 03/27/22 2000 14 days                    Physical Exam  Vitals reviewed.   Constitutional:       General: She is not in acute distress.     Appearance: Normal appearance. She is obese. She is not ill-appearing or toxic-appearing.      Comments: Appears old and frail   HENT:      Head: Normocephalic and atraumatic.      Mouth/Throat:      Mouth: Mucous membranes are moist.   Cardiovascular:      Rate and Rhythm: Normal rate and regular rhythm.      Heart sounds: No murmur heard.    No friction rub. No gallop.      Comments: Peripheral pulses palpable 2+ radially and femorally, bruising to the upper extremities from multiple IV attempts and blood draws  Pulmonary:      Effort: Pulmonary effort is normal. No respiratory distress.      Breath sounds: Normal breath sounds.   Abdominal:      Palpations: Abdomen is soft.   Musculoskeletal:      Right lower leg: No edema.      Left lower leg: No edema.   Skin:     General: Skin is warm.   Neurological:      Mental Status: She is alert. Mental status is at baseline.     Significant  Labs: BMP:   Recent Labs   Lab 04/10/22  0355 04/10/22  0852 04/11/22  0454   GLU  --  94 190*   NA  --  140 141   K  --  4.5 5.1   CL  --  103 105   CO2  --  26 26   BUN  --  32* 43*   CREATININE  --  1.4 1.7*   CALCIUM  --  9.2 9.7   MG 1.9  --  2.4   , CMP   Recent Labs   Lab 04/10/22  0852 04/11/22  0454    141   K 4.5 5.1    105   CO2 26 26   GLU 94 190*   BUN 32* 43*   CREATININE 1.4 1.7*   CALCIUM 9.2 9.7   PROT 5.8* 6.3   ALBUMIN 3.0* 2.8*   BILITOT 0.6 0.5   ALKPHOS 84 78   AST 14 12   ALT 9* 9*   ANIONGAP 11 10   ESTGFRAFRICA 42.7* 33.8*   EGFRNONAA 37.0* 29.3*   , CBC   Recent Labs   Lab 04/10/22  0853   WBC 8.07   HGB 11.1*   HCT 36.5*      , INR No results for input(s): INR, PROTIME in the last 48 hours., Lipid Panel No results for input(s): CHOL, HDL, LDLCALC, TRIG, CHOLHDL in the last 48 hours., Troponin No results for input(s): TROPONINI in the last 48 hours., and All pertinent lab results from the last 24 hours have been reviewed.    Significant Imaging: Echocardiogram: 2D echo with color flow doppler: No results found for this or any previous visit. and Transthoracic echo (TTE) complete (Cupid Only):   Results for orders placed or performed during the hospital encounter of 04/08/22   Echo   Result Value Ref Range    LVIDd 3.37 (A) 3.5 - 6.0 cm    LVIDs 2.36 2.1 - 4.0 cm    LV Diastolic Volume 46.49 mL    LV Systolic Volume 19.23 mL    FS 30 %    LV Systolic Volume Index 10.6 mL/m2    LV Diastolic Volume Index 25.54 mL/m2    BSA 1.87 m2    EF 60 %    Narrative    · Limited study for wall mogran. several off axis views.  · The estimated ejection fraction is 60%.  · The left ventricle is normal in size with normal systolic function.  · The following segments are hypokinetic: basal inferoseptal and basal   inferior. All other segments are normal.  · Normal right ventricular size with normal right ventricular systolic   function.       and EKG 04/08/2022: sinus rhythm, no significant  ST-T changes from baseline (baseline lead II and III TWI)  ECG 04/10/2022 unchanged from 04/08/2022 from rhythm at ST-T change standpoint    Assessment and Plan:     * NSTEMI (non-ST elevated myocardial infarction)  Patient is a 76 year old female with risk factors for CAD, known CAD from angiogram 02/10/2022 that presented with episode of chest discomfort on 04/07/2022 for which patient was admitted to [facility]. Transferred to [facility] on 04/08/2022 during the evening. Chest pain free since transition to [facility]. ECG did not show significant ST-T changes. Trop peaked at 0.072 at [facility]. Loaded with aspirin and Brilinta. Patient was planned to undergo outpatient  of the distal RCA.   - Can feed patient today  - Plan for [facility] +/-  distal RCA, possibly on Wednesday  - Optimize patient prior to coronary angiogram    Normocytic anemia  Hgb 11 on 04/11/2022    Chronic diastolic congestive heart failure  HFpEF LVEF 60%     Interpretation Summary  · Limited study for wall morgan. several off axis views.  · The estimated ejection fraction is 60%.  · The left ventricle is normal in size with normal systolic function.  · The following segments are hypokinetic: basal inferoseptal and basal inferior. All other segments are normal.  · Normal right ventricular size with normal right ventricular systolic function.  Last BNP reviewed- and noted below   Recent Labs   Lab 04/09/22  0501   *     Management as per primary    Chronic hypoxemic respiratory failure  On 3 LPM at home    CKD (chronic kidney disease), stage III  Cr 1.7 on 04/11/2022    Essential hypertension, benign  As per primary    VTE Risk Mitigation (From admission, onward)         Ordered     IP VTE HIGH RISK PATIENT  Once         04/08/22 2203     Place sequential compression device  Until discontinued         04/08/22 2203              Thank you for your consult. We will follow-up with patient. Please contact us if you have any additional  questions.    Plan of care was discussed with staff, Dr Chappell.     Zoran Martin MD  Interventional Cardiology, Fellow PGY4   [facility] - Cardiology Stepdown

## 2022-04-11 NOTE — PROGRESS NOTES
[facility] Step[facility]  Cardiology  Progress Note    Patient Name: Marisol Kerr  MRN: [id number]  Admission Date: 4/8/2022  Hospital Length of Stay: 3 days  Code Status: Full Code   Attending Physician: Karl Ontiveros MD   Primary Care Physician: Khadar Dwyer MD  Expected Discharge Date: 4/12/2022  Principal Problem:NSTEMI (non-ST elevated myocardial infarction)    Subjective:     Hospital Course:   Pt asymptomatic on DAPT and Heparin. Heparin Dc'd upon arrival in setting of hx of rectus sheath hematoma managed by IR after last L heart cath. Euvolemic and managed on home Lasix. Patient remains NPO pending high risk PCI w/ Dr. Chappell.       Interval History: Overnight, patient w/ nausea, vomiting and bloating. She received PRN Zofran in addition to Pepcid w/ helped relieve her symptoms. No additional episodes since then. This morning, patient w/ blister on lower lip that is oozing blood. Also w/ increased sputum production. Will order barrier ointment for lip and repeat CXR to evaluate increased sputum production and cough. Patient remains NPO in preparation of high risk PCI w/ Dr. Chappell. She received pre-procedure steroids due to her contrast allergy.     Review of Systems   Constitutional: Negative for chills, decreased appetite, diaphoresis, fever, malaise/fatigue and weight gain.   HENT: Negative.  Negative for congestion and sore throat.    Eyes:  Negative for blurred vision and double vision.   Cardiovascular:  Negative for chest pain, dyspnea on exertion, irregular heartbeat, leg swelling and palpitations.   Respiratory:  Positive for cough. Negative for hemoptysis, shortness of breath and wheezing.    Hematologic/Lymphatic: Positive for bleeding problem. Bruises/bleeds easily.   Skin: Negative.  Negative for itching and rash.   Musculoskeletal:  Positive for arthritis. Negative for falls and joint pain.   Gastrointestinal:  Positive for nausea and vomiting. Negative for bloating, abdominal pain,  constipation, diarrhea and melena.   Genitourinary: Negative.  Negative for hematuria and urgency.   Neurological:  Negative for dizziness, headaches, light-headedness and numbness.   Psychiatric/Behavioral: Negative.  Negative for altered mental status and depression.    Objective:     Vital Signs (Most Recent):  Temp: 97.8 °F (36.6 °C) (04/11/22 0742)  Pulse: 80 (04/11/22 0844)  Resp: 20 (04/11/22 0844)  BP: (!) 144/65 (04/11/22 0742)  SpO2: 96 % (04/11/22 0844) Vital Signs (24h Range):  Temp:  [97.6 °F (36.4 °C)-98.7 °F (37.1 °C)] 97.8 °F (36.6 °C)  Pulse:  [59-88] 80  Resp:  [16-20] 20  SpO2:  [94 %-97 %] 96 %  BP: (120-149)/(58-68) 144/65     Weight: 78.7 kg (173 lb 6.3 oz)  Body mass index is 30.71 kg/m².     SpO2: 96 %  O2 Device (Oxygen Therapy): nasal cannula      Intake/Output Summary (Last 24 hours) at 4/11/2022 1026  Last data filed at 4/11/2022 0600  Gross per 24 hour   Intake 542 ml   Output 1800 ml   Net -1258 ml         Lines/Drains/Airways       Drain  Duration             Female External Urinary Catheter 03/27/22 2000 14 days                    Physical Exam  Vitals and nursing note reviewed.   Constitutional:       Appearance: Normal appearance.   HENT:      Head: Normocephalic and atraumatic.      Nose: Nose normal.      Mouth/Throat:      Mouth: Mucous membranes are moist.      Pharynx: Oropharynx is clear.      Comments: Oozing blister on bottom lip   Eyes:      Extraocular Movements: Extraocular movements intact.   Cardiovascular:      Rate and Rhythm: Normal rate and regular rhythm.      Pulses: Normal pulses.      Heart sounds: Normal heart sounds.   Pulmonary:      Effort: Pulmonary effort is normal.      Breath sounds: Normal breath sounds. No wheezing or rales.   Abdominal:      General: Bowel sounds are normal.      Palpations: Abdomen is soft.      Tenderness: There is no abdominal tenderness.      Hernia: A hernia (R hernia abdominal hernia, reducable, non-painful) is present.    Musculoskeletal:         General: Normal range of motion.      Cervical back: Normal range of motion and neck supple.      Right lower leg: No edema.      Left lower leg: No edema.   Skin:     General: Skin is warm and dry.   Neurological:      General: No focal deficit present.      Mental Status: She is alert and oriented to person, place, and time.   Psychiatric:         Mood and Affect: Mood normal.         Behavior: Behavior normal.     Significant Labs: All pertinent lab results from the last 24 hours have been reviewed.    Significant Imaging: Echocardiogram: Transthoracic echo (TTE) complete (Cupid Only):   Results for orders placed or performed during the hospital encounter of 04/08/22   Echo   Result Value Ref Range    LVIDd 3.37 (A) 3.5 - 6.0 cm    LVIDs 2.36 2.1 - 4.0 cm    LV Diastolic Volume 46.49 mL    LV Systolic Volume 19.23 mL    FS 30 %    LV Systolic Volume Index 10.6 mL/m2    LV Diastolic Volume Index 25.54 mL/m2    BSA 1.87 m2    EF 60 %    Narrative    · Limited study for wall morgan. several off axis views.  · The estimated ejection fraction is 60%.  · The left ventricle is normal in size with normal systolic function.  · The following segments are hypokinetic: basal inferoseptal and basal   inferior. All other segments are normal.  · Normal right ventricular size with normal right ventricular systolic   function.        Assessment and Plan:       * NSTEMI (non-ST elevated myocardial infarction)  Acute onset chest pressure, EKG with lateral ST depressions, and troponin 0.06 (mildly elevated) all consistent with NSTEMI. Currently stable and asymptomatic. 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 Outpt plan to follow-up with Dr Chappell for high risk PCI. Troponin peaked at 0.07.     - Start ACS reloaded with ASA/Brilinta, will not continue heparin ggt as pt has hx of rectus sheath hematoma during last L heart cath.   - Continue  atorvastatin, metoprolol  - f/u Echo  - SL Nitro PRN for chest pain  - patient NPO at midnight for high risk PCI today as per interventional cardiology. She has received steroid dosing given her contrast allergy.      Normocytic anemia  Hb 9.8 on arrival and at baseline. Prior rectus sheath hematoma after angiogram 2/2022 requiring inferior epigastric artery embolization. No s/s bleeding and on DAPT.     - Continue home ferrous sulfate  - Type and screen for potential procedure  - Trend CBC daily  - Transfuse to maintain Hb >8 with CAD    Chronic diastolic congestive heart failure  Normal EF 2/2022 and no s/s of exacerbation. TTE on 04/09 with EF 60, hypokinetic basal inferoseptal and inferior hypokinesis.    - Continue home furosemide 40mg qd  - Daily weights, strict I/Os  - Not on MRA or SLGT2; can consider as outpt      Chronic hypoxemic respiratory failure  On NC at home and underlying COPD. No s/s exacerbation.    - Continue home inhaler regimen  - Albuterol nebs PRN  - Supplemental oxygen  - patient w/ increased sputum production, will obtain CXR    CKD (chronic kidney disease), stage III  Cr 1.3 on arrival with baseline 1.3-1.6.    - Continue home lisinopril  - Trend BMP, Mag, Phos  - Strict I/Os  - Avoid nephrotoxins    Gastroesophageal reflux disease without esophagitis  - Continue home PPI    Essential hypertension, benign  - Continue home amlodipine, lisinopril        VTE Risk Mitigation (From admission, onward)         Ordered     IP VTE HIGH RISK PATIENT  Once         04/08/22 2203     Place sequential compression device  Until discontinued         04/08/22 2203                Estrellita Salinas MD  Cardiology  [facility]

## 2022-04-11 NOTE — HPI
Patient is a 76 year old female with PMHx of T2DM, HTN, HLD, CKD, COPD on home O2 who presents SNF following hospitalization for NSTEMI.  She was discharged from skilled nursing facility on April 1st.  Patient initially presented in 02/2022 to [facility] for a left heart catheterization to evaluate recently increasing episodes of angina requiring more frequent Nitroglycerin use. [place] [facility] [place] concerns of lateral precordial lead ST depressions and potential emergent intervention. LHC at outside hospital was notable for significant coronary artery disease. Patient admitted to CCU while pending evaluation for ACS intervention, given her NSTEMI. CTS evaluated patient and did not recommend CABG in light of significant debility. Patient developed significant abdominal pain overnight and distended abdomen with mass, for which Interventional deferred LHC. CT of the abdomen with contrast showed a large rectus sheath hematoma. IR consulted and performed embolization of the left inferior epigastric artery. Patient's Cr elevated to 1.9 likely due to contrast and Hgb dropped requiring transfusion of 2 units RBC, leading to Hgb increase to 9.2 g/dL. IC recommended that since patient had a recent bleed, would defer procedure for outpatient in 1-2 weeks.  During her stay in skilled nursing facility she did not have chest pain. She has had cold-like symptoms running of nose and cough.  On 04/07/2022, patient developed severe chest pain relieved with sublingual nitroglycerin.  During the night she had recurrence symptoms.  Troponin 0.058 --> 0.066 --> 0.072 (peak) --> 0.055. Transferred from [facility] for evaluation of  distal RCA. ECGs on 04/08/2022 and 04/10/2022 without any significant ST-T wave changes from baseline. Started on heparin drip but was discontinued within 12 hours. Loaded with ASA and Brilinta. Noted Iodine contrast allergy and thus started on contrast  allergy prep in anticipation for catheterization. She denies any chest discomfort at [facility] thus far. Reports productive cough x 1 week with green sputum and fever blisters.     Angiogram 02/10/2022 from [facility]: 60% pLAD stenosis, tandem LCx stenosis (80% prox, 70% distal), 90% mRCA stenosis and occluded distal RCA stenosis with patent R to L collateral, 100% stenosed PRAV, and 100% RPDA stenosis.

## 2022-04-11 NOTE — PROGRESS NOTES
Notified MD. Slainas pt lips bleeding at fever blister site, and there was scant blood in the sputum. MD. Salinas is ok to give Brilinta this AM. Wet gauze and lip balm applied.

## 2022-04-11 NOTE — SUBJECTIVE & OBJECTIVE
Past Medical History:   Diagnosis Date    CAD (coronary artery disease)     Cancer     colon    CHF (congestive heart failure)     Colitis     Colon cancer     COPD (chronic obstructive pulmonary disease)     Decreased hearing     Diabetes mellitus     Diabetes mellitus, type 2     Hypercholesterolemia     Hypertension     Hypoxemia     Insomnia     Obesity     Osteoarthritis        Past Surgical History:   Procedure Laterality Date    ANGIOGRAM, CORONARY, WITH LEFT HEART CATHETERIZATION N/A 2/10/2022    Procedure: Angiogram, Coronary, with Left Heart Cath;  Surgeon: Cristian Benjamin MD;  Location: [facility] CATH/EP LAB;  Service: Cardiology;  Laterality: N/A;    CARDIAC CATHETERIZATION      CHOLECYSTECTOMY      COLECTOMY      CORONARY ANGIOPLASTY      CORONARY STENT PLACEMENT      EXTERNAL EAR SURGERY      EYE SURGERY      FLEXIBLE SIGMOIDOSCOPY N/A 9/19/2019    Procedure: SIGMOIDOSCOPY, FLEXIBLE;  Surgeon: Biju Kramer III, MD;  Location: [facility] ENDO;  Service: Endoscopy;  Laterality: N/A;    HYSTERECTOMY      partial       Review of patient's allergies indicates:   Allergen Reactions    Iodinated contrast media     Strawberries [strawberry] Hives and Itching       PTA Medications   Medication Sig    acetaminophen (TYLENOL) 325 MG tablet Take 2 tablets (650 mg total) by mouth every 4 (four) hours as needed.    albuterol (VENTOLIN HFA) 90 mcg/actuation inhaler Inhale 2 puffs into the lungs every 6 (six) hours as needed for Wheezing or Shortness of Breath. Rescue    aspirin (ECOTRIN) 81 MG EC tablet Take 81 mg by mouth every morning.     atorvastatin (LIPITOR) 80 MG tablet Take 80 mg by mouth once daily.    ergocalciferol (VITAMIN D2) 50,000 unit Cap Take 1 capsule (50,000 Units total) by mouth every 7 days.    ferrous sulfate 325 (65 FE) MG EC tablet Take 1 tablet (325 mg total) by mouth once daily.    fish oil-omega-3 fatty acids 300-1,000 mg capsule Take 1 capsule by mouth every morning.     ipratropium-albuteroL (COMBIVENT RESPIMAT)  mcg/actuation inhaler Inhale 2 puffs into the lungs every 4 (four) hours as needed for Shortness of Breath. Rescue    metoprolol succinate (TOPROL-XL) 50 MG 24 hr tablet Take 1 tablet (50 mg total) by mouth once daily.    nitroGLYCERIN 0.4 MG/DOSE TL SPRY (NITROLINGUAL) 400 mcg/spray spray USE ONE SPRAY UNDER THE TONGUE EVERY 5 MINUTES AS NEEDED FOR CHEST PAIN.  DO NOT EXCEED A TOTAL OF 3 SPRAYS IN 15 MINUTES    pantoprazole (PROTONIX) 40 MG tablet Take 1 tablet (40 mg total) by mouth once daily.    triamcinolone acetonide 0.1% (KENALOG) 0.1 % cream Apply topically 2 (two) times daily. For legs.    amLODIPine (NORVASC) 10 MG tablet Take 1 tablet (10 mg total) by mouth once daily.    cholestyramine (QUESTRAN) 4 gram packet Take 1 packet (4 g total) by mouth 2 (two) times daily.    coenzyme Q10 100 mg capsule Take 100 mg by mouth every morning.    fluticasone-salmeterol diskus inhaler 250-50 mcg Inhale 1 puff into the lungs 2 (two) times daily.    furosemide (LASIX) 20 MG tablet Take 2 tablets (40 mg total) by mouth once daily.    gabapentin (NEURONTIN) 100 MG capsule Take 1 capsule (100 mg total) by mouth every evening.    isosorbide mononitrate (IMDUR) 30 MG 24 hr tablet Take 1 tablet (30 mg total) by mouth once daily.    lisinopriL 10 MG tablet Take 1 tablet (10 mg total) by mouth once daily. HOLD UNTIL OTHERWISE DIRECTED BY PRIMARY CARE PROVIDER    NARCAN 4 mg/actuation Spry     nitroGLYCERIN 0.2 mg/hr TD PT24 (NITRODUR) 0.2 mg/hr APPLY 1 PATCH TOPICALLY ONCE DAILY TO LEG.    ondansetron (ZOFRAN-ODT) 4 MG TbDL Take 2 tablets (8 mg total) by mouth every 6 (six) hours as needed.    temazepam (RESTORIL) 15 mg Cap Take 15 mg by mouth nightly as needed.    umeclidinium (INCRUSE ELLIPTA) 62.5 mcg/actuation inhalation capsule Inhale 62.5 mcg into the lungs every morning. Controller     Family History       Problem Relation (Age of Onset)    Febrile seizures Mother     Heart failure Father          Tobacco Use    Smoking status: Former Smoker     Packs/day: 3.00     Years: 50.00     Pack years: 150.00     Types: Cigarettes     Start date: 3/30/1964     Quit date: 2006     Years since quittin.1    Smokeless tobacco: Never Used    Tobacco comment: ex smoker 15 years   Substance and Sexual Activity    Alcohol use: Yes    Drug use: No    Sexual activity: Never     Review of Systems   Constitutional: Negative for chills and fever.        Fever blister bleeding, nausea overnight   Cardiovascular:  Negative for chest pain, orthopnea and palpitations.   Respiratory:  Positive for cough (for 1 week now with green sputum). Negative for shortness of breath.    Gastrointestinal:  Negative for abdominal pain.   Neurological:  Negative for dizziness, headaches and light-headedness.   Psychiatric/Behavioral:  Negative for altered mental status.    Objective:     Vital Signs (Most Recent):  Temp: 97.8 °F (36.6 °C) (22)  Pulse: 87 (22)  Resp: 18 (22)  BP: (!) 144/65 (22)  SpO2: (!) 94 % (22)   Vital Signs (24h Range):  Temp:  [97.6 °F (36.4 °C)-98.7 °F (37.1 °C)] 97.8 °F (36.6 °C)  Pulse:  [59-88] 87  Resp:  [16-20] 18  SpO2:  [94 %-97 %] 94 %  BP: (120-149)/(58-76) 144/65     Weight: 78.7 kg (173 lb 6.3 oz)  Body mass index is 30.71 kg/m².    SpO2: (!) 94 %  O2 Device (Oxygen Therapy): nasal cannula      Intake/Output Summary (Last 24 hours) at 2022 0745  Last data filed at 2022 0600  Gross per 24 hour   Intake 662 ml   Output 2250 ml   Net -1588 ml       Lines/Drains/Airways       Drain  Duration             Female External Urinary Catheter 22 14 days                    Physical Exam  Vitals reviewed.   Constitutional:       General: She is not in acute distress.     Appearance: Normal appearance. She is obese. She is not ill-appearing or toxic-appearing.      Comments: Appears old and frail   HENT:      Head:  Normocephalic and atraumatic.      Mouth/Throat:      Mouth: Mucous membranes are moist.   Cardiovascular:      Rate and Rhythm: Normal rate and regular rhythm.      Heart sounds: No murmur heard.    No friction rub. No gallop.      Comments: Peripheral pulses palpable 2+ radially and femorally, bruising to the upper extremities from multiple IV attempts and blood draws  Pulmonary:      Effort: Pulmonary effort is normal. No respiratory distress.      Breath sounds: Normal breath sounds.   Abdominal:      Palpations: Abdomen is soft.   Musculoskeletal:      Right lower leg: No edema.      Left lower leg: No edema.   Skin:     General: Skin is warm.   Neurological:      Mental Status: She is alert. Mental status is at baseline.     Significant Labs: BMP:   Recent Labs   Lab 04/10/22  0355 04/10/22  0852 04/11/22  0454   GLU  --  94 190*   NA  --  140 141   K  --  4.5 5.1   CL  --  103 105   CO2  --  26 26   BUN  --  32* 43*   CREATININE  --  1.4 1.7*   CALCIUM  --  9.2 9.7   MG 1.9  --  2.4   , CMP   Recent Labs   Lab 04/10/22  0852 04/11/22  0454    141   K 4.5 5.1    105   CO2 26 26   GLU 94 190*   BUN 32* 43*   CREATININE 1.4 1.7*   CALCIUM 9.2 9.7   PROT 5.8* 6.3   ALBUMIN 3.0* 2.8*   BILITOT 0.6 0.5   ALKPHOS 84 78   AST 14 12   ALT 9* 9*   ANIONGAP 11 10   ESTGFRAFRICA 42.7* 33.8*   EGFRNONAA 37.0* 29.3*   , CBC   Recent Labs   Lab 04/10/22  0853   WBC 8.07   HGB 11.1*   HCT 36.5*      , INR No results for input(s): INR, PROTIME in the last 48 hours., Lipid Panel No results for input(s): CHOL, HDL, LDLCALC, TRIG, CHOLHDL in the last 48 hours., Troponin No results for input(s): TROPONINI in the last 48 hours., and All pertinent lab results from the last 24 hours have been reviewed.    Significant Imaging: Echocardiogram: 2D echo with color flow doppler: No results found for this or any previous visit. and Transthoracic echo (TTE) complete (Cupid Only):   Results for orders placed or performed  during the hospital encounter of 04/08/22   Echo   Result Value Ref Range    LVIDd 3.37 (A) 3.5 - 6.0 cm    LVIDs 2.36 2.1 - 4.0 cm    LV Diastolic Volume 46.49 mL    LV Systolic Volume 19.23 mL    FS 30 %    LV Systolic Volume Index 10.6 mL/m2    LV Diastolic Volume Index 25.54 mL/m2    BSA 1.87 m2    EF 60 %    Narrative    · Limited study for wall morgan. several off axis views.  · The estimated ejection fraction is 60%.  · The left ventricle is normal in size with normal systolic function.  · The following segments are hypokinetic: basal inferoseptal and basal   inferior. All other segments are normal.  · Normal right ventricular size with normal right ventricular systolic   function.       and EKG 04/08/2022: sinus rhythm, no significant ST-T changes from baseline (baseline lead II and III TWI)  ECG 04/10/2022 unchanged from 04/08/2022 from rhythm at ST-T change standpoint

## 2022-04-11 NOTE — ASSESSMENT & PLAN NOTE
Acute onset chest pressure, EKG with lateral ST depressions, and troponin 0.06 (mildly elevated) all consistent with NSTEMI. Currently stable and asymptomatic. 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 Outpt plan to follow-up with Dr Chappell for high risk PCI. Troponin peaked at 0.07.     - Start ACS reloaded with ASA/Brilinta, will not continue heparin ggt as pt has hx of rectus sheath hematoma during last L heart cath.   - Continue atorvastatin, metoprolol  - f/u Echo  - SL Nitro PRN for chest pain  - patient NPO at midnight for high risk PCI today as per interventional cardiology. She has received steroid dosing given her contrast allergy.

## 2022-04-11 NOTE — ASSESSMENT & PLAN NOTE
HFpEF LVEF 60%     Interpretation Summary  · Limited study for wall morgan. several off axis views.  · The estimated ejection fraction is 60%.  · The left ventricle is normal in size with normal systolic function.  · The following segments are hypokinetic: basal inferoseptal and basal inferior. All other segments are normal.  · Normal right ventricular size with normal right ventricular systolic function.  Last BNP reviewed- and noted below   Recent Labs   Lab 04/09/22  0501   *     Management as per primary

## 2022-04-11 NOTE — ASSESSMENT & PLAN NOTE
Patient is a 76 year old female with risk factors for CAD, known CAD from angiogram 02/10/2022 that presented with episode of chest discomfort on 04/07/2022 for which patient was admitted to [facility]. Transferred to [facility] on 04/08/2022 during the evening. Chest pain free since [place] [facility] did not show significant ST-T changes. Trop peaked at 0.072 at [place]. Loaded with aspirin and Brilinta. Patient was planned to undergo outpatient  of the distal RCA.   --C +/- PCI, patient is a BANDAR candidate  - Anti-platelet Therapy: ASA / Ticagrelor  - Access: Right femoral  - Catheters: JR4, JL4  - Creatinine/CrCl: 1.7  - Allergies: Iodine allergy, itchiness  - Pre-Hydration:  cc/hr x 1 hr  - Pre-Op Med: Allergy contrast protocol   - All patient's questions were answered.  -The risks, benefits and alternatives of the procedure were explained to the patient.   -The risks of coronary angiography include but are not limited to: bleeding, infection, heart rhythm abnormalities, allergic reactions, kidney injury and potential need for dialysis, stroke and death.   - Should stenting be indicated, the patient has agreed to dual anti-platelet therapy for 1-consecutive year with a drug-eluting stent and a minimum of 1-month with the use of a bare metal stent  - Additionally, pt is aware that non-compliance is likely to result in stent clotting with heart attack, heart failure, and/or death  -The risks of moderate sedation include hypotension, respiratory depression, arrhythmias, bronchospasm, and death.   - Informed consent was obtained and the patient is agreeable to proceed with the procedure.

## 2022-04-11 NOTE — PLAN OF CARE
[facility] - Cardiology Stepdown  Initial Discharge Assessment       Primary Care Provider: Khadar Dwyer MD    Admission Diagnosis: NSTEMI (non-ST elevated myocardial infarction) [I21.4]    Admission Date: 4/8/2022  Expected Discharge Date: 4/14/2022    Discharge Barriers Identified: None    Payor: MEDICARE / Plan: MEDICARE PART A & B / Product Type: Government /     Extended Emergency Contact Information  Primary Emergency Contact: Marisol Kerr  Address: [facility] [place] [address] [place]  Mobile Phone: [phone]  Relation: Daughter  Preferred language: English   needed? No    Discharge Plan A: Home Health, Home with family  Discharge Plan B: Home with family      [facility] [address], [place] [address]  [address] [address]  Phone: [phone] Fax: [phone]      Initial Assessment (most recent)     Adult Discharge Assessment - 04/11/22 1510        Discharge Assessment    Assessment Type Discharge Planning Assessment     Confirmed/corrected address, phone number and insurance Yes     Confirmed Demographics Correct on Facesheet     Source of Information patient;family     Communicated LUZ with patient/caregiver Date not available/Unable to determine     Lives With child(grady), adult     Do you expect to return to your current living situation? Yes     Do you have help at home or someone to help you manage your care at home? Yes     Who are your caregiver(s) and their phone number(s)? Marisol Kerr (daughter) [phone]     Prior to hospitilization cognitive status: Alert/Oriented     Current cognitive status: Alert/Oriented     Walking or Climbing Stairs Difficulty ambulation difficulty, requires equipment     Mobility Management Transport wheelchair for long distances, cane     Dressing/Bathing Difficulty none     Equipment Currently Used at Home cane, straight;wheelchair;oxygen   Oxygen 2L NC from [facility]    Patient currently being followed by outpatient case management? No     Do you currently have service(s) that help you manage your care at home? Yes     Name and Contact number of agency [facility]     Do you take prescription medications? Yes     Do you have prescription coverage? Yes     Coverage Medicare/Medicaid     Do you have any problems affording any of your prescribed medications? TBD     Who is going to help you get home at discharge? Daughter     How do you get to doctors appointments? family or friend will provide     Are you on dialysis? No     Do you take coumadin? No     Discharge Plan A Home Health;Home with family     Discharge Plan B Home with family     DME Needed Upon Discharge  none     Discharge Plan discussed with: Patient;Adult children     Discharge Barriers Identified None                    Pt admitted 4/8/2022  8:51 PM    For NSTEMI (non-ST elevated myocardial infarction) [I21.4]       CM to bedside for assessment. Information obtained from patient and her daughter.  Pt lives with her daughter in house. Daughter will bring patient home at discharge. Will have support from daughter at d/c. Anticipate d/c to home with home health needs.    Pt is followed by Dr Cristian Benjamin  for cardiology at [facility].      PCP is Khadar Dwyer MD  [phone] (p)  [phone] (f)      Payor: MEDICARE / Plan: MEDICARE PART A & B / Product Type: [employer] /       [employer] [address] [place] [address]  Phone: [phone] Fax: [phone]      Extended Emergency Contact Information  Primary Emergency Contact: Marisol Kerr  Address: [address] [place] [address] [address] [place] [address] [place]  Mobile Phone: [phone]  Relation: Daughter  Preferred language: English   needed? No    Future Appointments   Date Time Provider Department Center   4/12/2022 10:20 AM Antonieta Marquez NP  [facility]   4/27/2022 11:20 AM Antonieta Marquez NP [facility]         Julie Haase RN  Case Management [phone]

## 2022-04-11 NOTE — ASSESSMENT & PLAN NOTE
On NC at home and underlying COPD. No s/s exacerbation.    - Continue home inhaler regimen  - Albuterol nebs PRN  - Supplemental oxygen  - patient w/ increased sputum production, will obtain CXR

## 2022-04-11 NOTE — SUBJECTIVE & OBJECTIVE
Interval History: Overnight, patient w/ nausea, vomiting and bloating. She received PRN Zofran in addition to Pepcid w/ helped relieve her symptoms. No additional episodes since then. This morning, patient w/ blister on lower lip that is oozing blood. Also w/ increased sputum production. Will order barrier ointment for lip and repeat CXR to evaluate increased sputum production and cough. Patient remains NPO in preparation of high risk PCI w/ Dr. Chappell. She received pre-procedure steroids due to her contrast allergy.     Review of Systems   Constitutional: Negative for chills, decreased appetite, diaphoresis, fever, malaise/fatigue and weight gain.   HENT: Negative.  Negative for congestion and sore throat.    Eyes:  Negative for blurred vision and double vision.   Cardiovascular:  Negative for chest pain, dyspnea on exertion, irregular heartbeat, leg swelling and palpitations.   Respiratory:  Positive for cough. Negative for hemoptysis, shortness of breath and wheezing.    Hematologic/Lymphatic: Positive for bleeding problem. Bruises/bleeds easily.   Skin: Negative.  Negative for itching and rash.   Musculoskeletal:  Positive for arthritis. Negative for falls and joint pain.   Gastrointestinal:  Positive for nausea and vomiting. Negative for bloating, abdominal pain, constipation, diarrhea and melena.   Genitourinary: Negative.  Negative for hematuria and urgency.   Neurological:  Negative for dizziness, headaches, light-headedness and numbness.   Psychiatric/Behavioral: Negative.  Negative for altered mental status and depression.    Objective:     Vital Signs (Most Recent):  Temp: 97.8 °F (36.6 °C) (04/11/22 0742)  Pulse: 80 (04/11/22 0844)  Resp: 20 (04/11/22 0844)  BP: (!) 144/65 (04/11/22 0742)  SpO2: 96 % (04/11/22 0844) Vital Signs (24h Range):  Temp:  [97.6 °F (36.4 °C)-98.7 °F (37.1 °C)] 97.8 °F (36.6 °C)  Pulse:  [59-88] 80  Resp:  [16-20] 20  SpO2:  [94 %-97 %] 96 %  BP: (120-149)/(58-68) 144/65      Weight: 78.7 kg (173 lb 6.3 oz)  Body mass index is 30.71 kg/m².     SpO2: 96 %  O2 Device (Oxygen Therapy): nasal cannula      Intake/Output Summary (Last 24 hours) at 4/11/2022 1026  Last data filed at 4/11/2022 0600  Gross per 24 hour   Intake 542 ml   Output 1800 ml   Net -1258 ml         Lines/Drains/Airways       Drain  Duration             Female External Urinary Catheter 03/27/22 2000 14 days                    Physical Exam  Vitals and nursing note reviewed.   Constitutional:       Appearance: Normal appearance.   HENT:      Head: Normocephalic and atraumatic.      Nose: Nose normal.      Mouth/Throat:      Mouth: Mucous membranes are moist.      Pharynx: Oropharynx is clear.      Comments: Oozing blister on bottom lip   Eyes:      Extraocular Movements: Extraocular movements intact.   Cardiovascular:      Rate and Rhythm: Normal rate and regular rhythm.      Pulses: Normal pulses.      Heart sounds: Normal heart sounds.   Pulmonary:      Effort: Pulmonary effort is normal.      Breath sounds: Normal breath sounds. No wheezing or rales.   Abdominal:      General: Bowel sounds are normal.      Palpations: Abdomen is soft.      Tenderness: There is no abdominal tenderness.      Hernia: A hernia (R hernia abdominal hernia, reducable, non-painful) is present.   Musculoskeletal:         General: Normal range of motion.      Cervical back: Normal range of motion and neck supple.      Right lower leg: No edema.      Left lower leg: No edema.   Skin:     General: Skin is warm and dry.   Neurological:      General: No focal deficit present.      Mental Status: She is alert and oriented to person, place, and time.   Psychiatric:         Mood and Affect: Mood normal.         Behavior: Behavior normal.     Significant Labs: All pertinent lab results from the last 24 hours have been reviewed.    Significant Imaging: Echocardiogram: Transthoracic echo (TTE) complete (Cupid Only):   Results for orders placed or  performed during the hospital encounter of 04/08/22   Echo   Result Value Ref Range    LVIDd 3.37 (A) 3.5 - 6.0 cm    LVIDs 2.36 2.1 - 4.0 cm    LV Diastolic Volume 46.49 mL    LV Systolic Volume 19.23 mL    FS 30 %    LV Systolic Volume Index 10.6 mL/m2    LV Diastolic Volume Index 25.54 mL/m2    BSA 1.87 m2    EF 60 %    Narrative    · Limited study for wall morgan. several off axis views.  · The estimated ejection fraction is 60%.  · The left ventricle is normal in size with normal systolic function.  · The following segments are hypokinetic: basal inferoseptal and basal   inferior. All other segments are normal.  · Normal right ventricular size with normal right ventricular systolic   function.

## 2022-04-11 NOTE — PT/OT/SLP PROGRESS
Occupational Therapy      Patient Name:  Marisol Kerr   MRN:  [id number]    Patient not seen today secondary to Patient unwilling to participate, stating increased pain to buttocks. Increased encouragement and education provided on importance and benefit of OOB activity with skilled therapist assistance. Will follow-up on next available date as willing to participate.    4/11/2022

## 2022-04-11 NOTE — PROGRESS NOTES
Notified MD. Salinas pt c/o dark spot on her left forarm, it's red around the dram spot and looks like other bruise spots on her Rt arm, and MD. Salinas went to talked to pt.

## 2022-04-12 ENCOUNTER — TELEPHONE (OUTPATIENT)
Dept: FAMILY MEDICINE | Facility: CLINIC | Age: 77
End: 2022-04-12

## 2022-04-12 DIAGNOSIS — I25.10 CORONARY ARTERY DISEASE, UNSPECIFIED VESSEL OR LESION TYPE, UNSPECIFIED WHETHER ANGINA PRESENT, UNSPECIFIED WHETHER NATIVE OR TRANSPLANTED HEART: Primary | ICD-10-CM

## 2022-04-12 LAB
ALBUMIN SERPL BCP-MCNC: 2.7 G/DL (ref 3.5–5.2)
ALP SERPL-CCNC: 81 U/L (ref 55–135)
ALT SERPL W/O P-5'-P-CCNC: 9 U/L (ref 10–44)
ANION GAP SERPL CALC-SCNC: 8 MMOL/L (ref 8–16)
AST SERPL-CCNC: 13 U/L (ref 10–40)
BASOPHILS # BLD AUTO: 0.02 K/UL (ref 0–0.2)
BASOPHILS NFR BLD: 0.2 % (ref 0–1.9)
BILIRUB SERPL-MCNC: 0.4 MG/DL (ref 0.1–1)
BUN SERPL-MCNC: 52 MG/DL (ref 8–23)
CALCIUM SERPL-MCNC: 9.3 MG/DL (ref 8.7–10.5)
CHLORIDE SERPL-SCNC: 104 MMOL/L (ref 95–110)
CO2 SERPL-SCNC: 26 MMOL/L (ref 23–29)
CREAT SERPL-MCNC: 1.4 MG/DL (ref 0.5–1.4)
DIFFERENTIAL METHOD: ABNORMAL
EOSINOPHIL # BLD AUTO: 0.1 K/UL (ref 0–0.5)
EOSINOPHIL NFR BLD: 0.5 % (ref 0–8)
ERYTHROCYTE [DISTWIDTH] IN BLOOD BY AUTOMATED COUNT: 13 % (ref 11.5–14.5)
EST. GFR  (AFRICAN AMERICAN): 42.7 ML/MIN/1.73 M^2
EST. GFR  (NON AFRICAN AMERICAN): 37 ML/MIN/1.73 M^2
GLUCOSE SERPL-MCNC: 106 MG/DL (ref 70–110)
HCT VFR BLD AUTO: 32.5 % (ref 37–48.5)
HGB BLD-MCNC: 10.1 G/DL (ref 12–16)
IMM GRANULOCYTES # BLD AUTO: 0.04 K/UL (ref 0–0.04)
IMM GRANULOCYTES NFR BLD AUTO: 0.3 % (ref 0–0.5)
LYMPHOCYTES # BLD AUTO: 1.1 K/UL (ref 1–4.8)
LYMPHOCYTES NFR BLD: 8.8 % (ref 18–48)
MAGNESIUM SERPL-MCNC: 2.4 MG/DL (ref 1.6–2.6)
MCH RBC QN AUTO: 28.5 PG (ref 27–31)
MCHC RBC AUTO-ENTMCNC: 31.1 G/DL (ref 32–36)
MCV RBC AUTO: 92 FL (ref 82–98)
MONOCYTES # BLD AUTO: 1 K/UL (ref 0.3–1)
MONOCYTES NFR BLD: 8 % (ref 4–15)
NEUTROPHILS # BLD AUTO: 10.3 K/UL (ref 1.8–7.7)
NEUTROPHILS NFR BLD: 82.2 % (ref 38–73)
NRBC BLD-RTO: 0 /100 WBC
PLATELET # BLD AUTO: 173 K/UL (ref 150–450)
PMV BLD AUTO: 11.9 FL (ref 9.2–12.9)
POCT GLUCOSE: 84 MG/DL (ref 70–110)
POTASSIUM SERPL-SCNC: 4.3 MMOL/L (ref 3.5–5.1)
PROT SERPL-MCNC: 5.9 G/DL (ref 6–8.4)
RBC # BLD AUTO: 3.55 M/UL (ref 4–5.4)
SODIUM SERPL-SCNC: 138 MMOL/L (ref 136–145)
WBC # BLD AUTO: 12.49 K/UL (ref 3.9–12.7)

## 2022-04-12 PROCEDURE — 83735 ASSAY OF MAGNESIUM: CPT | Performed by: INTERNAL MEDICINE

## 2022-04-12 PROCEDURE — 97165 OT EVAL LOW COMPLEX 30 MIN: CPT

## 2022-04-12 PROCEDURE — 80053 COMPREHEN METABOLIC PANEL: CPT

## 2022-04-12 PROCEDURE — 94640 AIRWAY INHALATION TREATMENT: CPT

## 2022-04-12 PROCEDURE — 20600001 HC STEP DOWN PRIVATE ROOM

## 2022-04-12 PROCEDURE — 99900035 HC TECH TIME PER 15 MIN (STAT)

## 2022-04-12 PROCEDURE — 99232 SBSQ HOSP IP/OBS MODERATE 35: CPT | Mod: GC,,, | Performed by: INTERNAL MEDICINE

## 2022-04-12 PROCEDURE — 99222 1ST HOSP IP/OBS MODERATE 55: CPT | Mod: GC,,, | Performed by: INTERNAL MEDICINE

## 2022-04-12 PROCEDURE — 94761 N-INVAS EAR/PLS OXIMETRY MLT: CPT

## 2022-04-12 PROCEDURE — 97116 GAIT TRAINING THERAPY: CPT

## 2022-04-12 PROCEDURE — 99232 PR SUBSEQUENT HOSPITAL CARE,LEVL II: ICD-10-PCS | Mod: GC,,, | Performed by: INTERNAL MEDICINE

## 2022-04-12 PROCEDURE — 85025 COMPLETE CBC W/AUTO DIFF WBC: CPT

## 2022-04-12 PROCEDURE — 27000221 HC OXYGEN, UP TO 24 HOURS

## 2022-04-12 PROCEDURE — 25000003 PHARM REV CODE 250: Performed by: STUDENT IN AN ORGANIZED HEALTH CARE EDUCATION/TRAINING PROGRAM

## 2022-04-12 PROCEDURE — 25000242 PHARM REV CODE 250 ALT 637 W/ HCPCS

## 2022-04-12 PROCEDURE — 63600175 PHARM REV CODE 636 W HCPCS: Performed by: INTERNAL MEDICINE

## 2022-04-12 PROCEDURE — 36415 COLL VENOUS BLD VENIPUNCTURE: CPT | Performed by: INTERNAL MEDICINE

## 2022-04-12 PROCEDURE — 92610 EVALUATE SWALLOWING FUNCTION: CPT

## 2022-04-12 PROCEDURE — 97535 SELF CARE MNGMENT TRAINING: CPT

## 2022-04-12 PROCEDURE — 25000003 PHARM REV CODE 250: Performed by: INTERNAL MEDICINE

## 2022-04-12 PROCEDURE — 99222 PR INITIAL HOSPITAL CARE,LEVL II: ICD-10-PCS | Mod: GC,,, | Performed by: INTERNAL MEDICINE

## 2022-04-12 PROCEDURE — 97162 PT EVAL MOD COMPLEX 30 MIN: CPT

## 2022-04-12 PROCEDURE — 25000003 PHARM REV CODE 250

## 2022-04-12 RX ORDER — GUAIFENESIN 600 MG/1
600 TABLET, EXTENDED RELEASE ORAL 2 TIMES DAILY
Status: DISCONTINUED | OUTPATIENT
Start: 2022-04-12 | End: 2022-04-20

## 2022-04-12 RX ORDER — GUAIFENESIN 600 MG/1
600 TABLET, EXTENDED RELEASE ORAL 2 TIMES DAILY
Qty: 28 TABLET | Refills: 0 | Status: SHIPPED | OUTPATIENT
Start: 2022-04-12 | End: 2022-04-22 | Stop reason: HOSPADM

## 2022-04-12 RX ORDER — SODIUM CHLORIDE 9 MG/ML
INJECTION, SOLUTION INTRAVENOUS CONTINUOUS
Status: CANCELLED | OUTPATIENT
Start: 2022-04-12 | End: 2022-04-12

## 2022-04-12 RX ORDER — DIPHENHYDRAMINE HCL 50 MG
50 CAPSULE ORAL ONCE
Status: CANCELLED | OUTPATIENT
Start: 2022-04-12 | End: 2022-04-12

## 2022-04-12 RX ORDER — CIMETIDINE 300 MG/1
300 TABLET, FILM COATED ORAL EVERY 6 HOURS
Qty: 3 TABLET | Refills: 0 | Status: SHIPPED | OUTPATIENT
Start: 2022-04-17 | End: 2022-04-22 | Stop reason: HOSPADM

## 2022-04-12 RX ORDER — DIPHENHYDRAMINE HCL 50 MG
50 CAPSULE ORAL EVERY 6 HOURS
Qty: 3 CAPSULE | Refills: 0 | Status: SHIPPED | OUTPATIENT
Start: 2022-04-17 | End: 2022-04-22 | Stop reason: HOSPADM

## 2022-04-12 RX ORDER — PREDNISONE 50 MG/1
50 TABLET ORAL EVERY 6 HOURS
Qty: 3 TABLET | Refills: 0 | Status: SHIPPED | OUTPATIENT
Start: 2022-04-17 | End: 2022-04-19 | Stop reason: HOSPADM

## 2022-04-12 RX ORDER — IPRATROPIUM BROMIDE AND ALBUTEROL SULFATE 2.5; .5 MG/3ML; MG/3ML
3 SOLUTION RESPIRATORY (INHALATION) EVERY 6 HOURS
Status: DISCONTINUED | OUTPATIENT
Start: 2022-04-12 | End: 2022-04-16

## 2022-04-12 RX ORDER — ASPIRIN 81 MG/1
81 TABLET ORAL EVERY MORNING
Qty: 360 TABLET | Refills: 0 | Status: SHIPPED | OUTPATIENT
Start: 2022-04-12 | End: 2022-05-17 | Stop reason: SDUPTHER

## 2022-04-12 RX ADMIN — FUROSEMIDE 40 MG: 40 TABLET ORAL at 08:04

## 2022-04-12 RX ADMIN — IPRATROPIUM BROMIDE AND ALBUTEROL SULFATE 3 ML: 2.5; .5 SOLUTION RESPIRATORY (INHALATION) at 08:04

## 2022-04-12 RX ADMIN — ASPIRIN 81 MG: 81 TABLET, COATED ORAL at 06:04

## 2022-04-12 RX ADMIN — TRIAMCINOLONE ACETONIDE: 1 CREAM TOPICAL at 01:04

## 2022-04-12 RX ADMIN — Medication 1 CAPSULE: at 06:04

## 2022-04-12 RX ADMIN — GABAPENTIN 100 MG: 100 CAPSULE ORAL at 10:04

## 2022-04-12 RX ADMIN — TRIAMCINOLONE ACETONIDE: 1 CREAM TOPICAL at 10:04

## 2022-04-12 RX ADMIN — GUAIFENESIN 600 MG: 600 TABLET, EXTENDED RELEASE ORAL at 10:04

## 2022-04-12 RX ADMIN — FERROUS SULFATE TAB 325 MG (65 MG ELEMENTAL FE) 1 EACH: 325 (65 FE) TAB at 08:04

## 2022-04-12 RX ADMIN — IPRATROPIUM BROMIDE AND ALBUTEROL SULFATE 3 ML: 2.5; .5 SOLUTION RESPIRATORY (INHALATION) at 01:04

## 2022-04-12 RX ADMIN — PANTOPRAZOLE SODIUM 40 MG: 40 TABLET, DELAYED RELEASE ORAL at 08:04

## 2022-04-12 RX ADMIN — METOPROLOL SUCCINATE 50 MG: 50 TABLET, EXTENDED RELEASE ORAL at 08:04

## 2022-04-12 RX ADMIN — TICAGRELOR 90 MG: 90 TABLET ORAL at 08:04

## 2022-04-12 RX ADMIN — LISINOPRIL 10 MG: 10 TABLET ORAL at 08:04

## 2022-04-12 RX ADMIN — ONDANSETRON 4 MG: 2 INJECTION INTRAMUSCULAR; INTRAVENOUS at 09:04

## 2022-04-12 RX ADMIN — BENZONATATE 100 MG: 100 CAPSULE, LIQUID FILLED ORAL at 10:04

## 2022-04-12 RX ADMIN — ATORVASTATIN CALCIUM 80 MG: 20 TABLET, FILM COATED ORAL at 08:04

## 2022-04-12 RX ADMIN — TICAGRELOR 90 MG: 90 TABLET ORAL at 10:04

## 2022-04-12 RX ADMIN — Medication 100 MG: at 06:04

## 2022-04-12 RX ADMIN — ISOSORBIDE MONONITRATE 30 MG: 30 TABLET, EXTENDED RELEASE ORAL at 08:04

## 2022-04-12 RX ADMIN — AMLODIPINE BESYLATE 10 MG: 10 TABLET ORAL at 08:04

## 2022-04-12 NOTE — PLAN OF CARE
Problem: SLP  Goal: SLP Goal  Outcome: Met  Bedside swallow study initiated. Patient declining trials past water 2/2 nausea and regurgitation of all intake at this time. Patient may benefit from a GI consult 2/2 patient reporting pain/globus sensation below the level of the sternal notch and noted gagging/regurgitation. ST will follow up to assess further consistencies as able.

## 2022-04-12 NOTE — ASSESSMENT & PLAN NOTE
On NC at home and underlying COPD. No s/s exacerbation.    - Continue home inhaler regimen  - Albuterol nebs PRN  - Supplemental oxygen  - patient w/ increased sputum production, CXR w/ increased bronchial markings but no consolidation or opacities. Concern for microaspirations - SLP consulted, appreciate recommendations. Appears that patient is regurgitating food. Will consult GI for oropharyngeal dysphagia vs globus sensation.

## 2022-04-12 NOTE — PLAN OF CARE
Problem: Adult Inpatient Plan of Care  Goal: Plan of Care Review  Outcome: Ongoing, Progressing  Goal: Patient-Specific Goal (Individualized)  Outcome: Ongoing, Progressing  Goal: Absence of Hospital-Acquired Illness or Injury  Outcome: Ongoing, Progressing  Goal: Optimal Comfort and Wellbeing  Outcome: Ongoing, Progressing  Goal: Readiness for Transition of Care  Outcome: Ongoing, Progressing     Problem: Diabetes Comorbidity  Goal: Blood Glucose Level Within Targeted Range  Outcome: Ongoing, Progressing     Problem: Impaired Wound Healing  Goal: Optimal Wound Healing  Outcome: Ongoing, Progressing     Problem: Fall Injury Risk  Goal: Absence of Fall and Fall-Related Injury  Outcome: Ongoing, Progressing     Problem: Skin Injury Risk Increased  Goal: Skin Health and Integrity  Outcome: Ongoing, Progressing

## 2022-04-12 NOTE — CONSULTS
Wound care consult received. Patient with documented partial thickness skin loss to the coccyx area that is pink. Patient has been being treated for incontinence associated skin damage that was pink and partial thickness when at the SNF unit last month.     Recommend to use critic aid clear to protect the skin from any further moisture or incontinence related damage. If the area looks like there is a rash, recommend to nursing to get order for miconazole ointment to protect from incontinence or other moisture while providing miconazole for antifungal coverage. Wound care will sign off, no needs at this time. Nursing to continue care.

## 2022-04-12 NOTE — PT/OT/SLP EVAL
Occupational Therapy   Evaluation    Name: Marisol Kerr  MRN: [id number]  Admitting Diagnosis:  NSTEMI (non-ST elevated myocardial infarction)  Recent Surgery: * No surgery found *      Recommendations:     Discharge Recommendations: home health PT, home health OT  Discharge Equipment Recommendations:   none  Barriers to discharge:   none    Assessment:     Marisol Kerr is a 76 y.o. female with a medical diagnosis of NSTEMI (non-ST elevated myocardial infarction).  She presents with the following performance deficits affecting function: weakness, impaired endurance, impaired self care skills, impaired functional mobilty, gait instability, impaired balance, impaired cardiopulmonary response to activity, edema.      Rehab Prognosis: Good; patient would benefit from acute skilled OT services to address these deficits and reach maximum level of function.       Plan:     Patient to be seen   to address the above listed problems via self-care/home management, therapeutic activities, therapeutic exercises, neuromuscular re-education  · Plan of Care Expires: 04/29/22  · Plan of Care Reviewed with:      Subjective     Chief Complaint: SOB  Patient/Family Comments/goals: Return home    Occupational Profile:  Living Environment: [facility], daughter, son in law, and grand daughter living with her currently, [place]  Previous level of function: Mod I  Roles and Routines: Time with family  Equipment Used at Home:  bedside commode, cane, straight, shower chair, other (see comments)  Assistance upon Discharge: Family    Pain/Comfort:  Pain Rating 1: 7/10  Pain Rating Post-Intervention 1: 7/10    Patients cultural, spiritual, Voodoo conflicts given the current situation: no    Objective:     Communicated with: Nursing prior to session.  Patient found supine with oxygen, telemetry upon OT entry to room.    General Precautions: Standard, aspiration   Orthopedic Precautions:N/A   Braces: N/A  Respiratory Status: Nasal cannula, flow 2  L/min    Occupational Performance:    Bed Mobility:    · Patient completed Rolling/Turning to Left with  stand by assistance  · Patient completed Scooting/Bridging with stand by assistance  · Patient completed Supine to Sit with stand by assistance  · Patient completed Sit to Supine with stand by assistance    Functional Mobility/Transfers:  · Patient completed Sit <> Stand Transfer with stand by assistance  with  rolling walker   · Functional Mobility: SBA with rolling walker within room from edge of bed to door    Activities of Daily Living:  · Grooming: stand by assistance seated  · Upper Body Dressing: stand by assistance seated edge of bed  · Lower Body Dressing: stand by assistance seated edge of bed    Cognitive/Visual Perceptual:  Cognitive/Psychosocial Skills:     -       Oriented to: Person, Place, Time and Situation   -       Follows Commands/attention:Follows multistep  commands  -       Safety awareness/insight to disability: intact   -       Mood/Affect/Coping skills/emotional control: Appropriate to situation and Cooperative    Physical Exam:  Postural examination/scapula alignment:    -       No postural abnormalities identified  Upper Extremity Range of Motion:     -       Right Upper Extremity: WFL  -       Left Upper Extremity: WFL  Upper Extremity Strength:    -       Right Upper Extremity: WFL  -       Left Upper Extremity: WFL   Strength:    -       Right Upper Extremity: WFL  -       Left Upper Extremity: WFL    AMPAC 6 Click ADL:  AMPAC Total Score: 22    Treatment & Education:  -Evaluation completed, plan of care established.  -Patient and family educated on roles/goals of OT and POC.  -White board updated.  -Therapist provided time for questions and answered within scope of practice.  -Patient educated on importance of EOB/OOB activity to maximize recovery.  Education:    Patient left HOB elevated with all lines intact and call button in reach    GOALS:   Multidisciplinary Problems      Occupational Therapy Goals        Problem: Occupational Therapy    Goal Priority Disciplines Outcome Interventions   Occupational Therapy Goal     OT, PT/OT Ongoing, Progressing    Description: Goals to be met by: 5/12/22     Patient will increase functional independence with ADLs by performing:    UE Dressing with Set-up Assistance.  LE Dressing with Stand-by Assistance.  Grooming while standing at sink with Set-up Assistance.  Toileting from toilet with Modified Wood for hygiene and clothing management.   Supine to sit with Modified Wood.  Step transfer with Modified Wood  Toilet transfer to toilet with Modified Wood.                     History:     Past Medical History:   Diagnosis Date    CAD (coronary artery disease)     Cancer     colon    CHF (congestive heart failure)     Colitis     Colon cancer     COPD (chronic obstructive pulmonary disease)     Decreased hearing     Diabetes mellitus     Diabetes mellitus, type 2     Hypercholesterolemia     Hypertension     Hypoxemia     Insomnia     Obesity     Osteoarthritis          Past Surgical History:   Procedure Laterality Date    ANGIOGRAM, CORONARY, WITH LEFT HEART CATHETERIZATION N/A 2/10/2022    Procedure: Angiogram, Coronary, with Left Heart Cath;  Surgeon: Cristian Benjamin MD;  Location: [facility] CATH/EP LAB;  Service: Cardiology;  Laterality: N/A;    CARDIAC CATHETERIZATION      CHOLECYSTECTOMY      COLECTOMY      CORONARY ANGIOPLASTY      CORONARY STENT PLACEMENT      EXTERNAL EAR SURGERY      EYE SURGERY      FLEXIBLE SIGMOIDOSCOPY N/A 9/19/2019    Procedure: SIGMOIDOSCOPY, FLEXIBLE;  Surgeon: Biju Kramer III, MD;  Location: [facility] ENDO;  Service: Endoscopy;  Laterality: N/A;    HYSTERECTOMY      partial       Time Tracking:     OT Date of Treatment: 04/12/22  OT Start Time: 1045  OT Stop Time: 1108  OT Total Time (min): 23 min    Billable Minutes:Evaluation 10  Therapeutic Activity  13    4/12/2022

## 2022-04-12 NOTE — PT/OT/SLP EVAL
Speech Language Pathology Evaluation  Bedside Swallow    Patient Name:  Marisol Kerr   MRN:  [id number]   322/322 A    Admitting Diagnosis: NSTEMI (non-ST elevated myocardial infarction)    Recommendations:                 General Recommendations:  GI evaluation and ongoing swallowing assessment  Diet recommendations: Continue regular diet and thin liquids as tolerated  Aspiration Precautions: HOB to 90 degrees, Remain upright 30 minutes post meal and Standard aspiration precautions  General Precautions: Standard, aspiration  Communication strategies:  none    History:     Past Medical History:   Diagnosis Date    CAD (coronary artery disease)     Cancer     colon    CHF (congestive heart failure)     Colitis     Colon cancer     COPD (chronic obstructive pulmonary disease)     Decreased hearing     Diabetes mellitus     Diabetes mellitus, type 2     Hypercholesterolemia     Hypertension     Hypoxemia     Insomnia     Obesity     Osteoarthritis        Past Surgical History:   Procedure Laterality Date    ANGIOGRAM, CORONARY, WITH LEFT HEART CATHETERIZATION N/A 2/10/2022    Procedure: Angiogram, Coronary, with Left Heart Cath;  Surgeon: Cristian Benjamin MD;  Location: [facility] CATH/EP LAB;  Service: Cardiology;  Laterality: N/A;    CARDIAC CATHETERIZATION      CHOLECYSTECTOMY      COLECTOMY      CORONARY ANGIOPLASTY      CORONARY STENT PLACEMENT      EXTERNAL EAR SURGERY      EYE SURGERY      FLEXIBLE SIGMOIDOSCOPY N/A 9/19/2019    Procedure: SIGMOIDOSCOPY, FLEXIBLE;  Surgeon: Biju Kramer III, MD;  Location: [facility] ENDO;  Service: Endoscopy;  Laterality: N/A;    HYSTERECTOMY      partial     MD note 4/11: Hospital Course: Pt asymptomatic on DAPT and Heparin. Heparin Dc'd upon arrival in setting of hx of rectus sheath hematoma managed by IR after last L heart cath. Euvolemic and managed on home Lasix. Patient remains NPO pending high risk PCI w/ Dr. Chappell.   Interval History: Overnight,  "patient w/ nausea, vomiting and bloating. She received PRN Zofran in addition to Pepcid w/ helped relieve her symptoms. No additional episodes since then. This morning, patient w/ blister on lower lip that is oozing blood. Also w/ increased sputum production. Will order barrier ointment for lip and repeat CXR to evaluate increased sputum production and cough. Patient remains NPO in preparation of high risk PCI w/ Dr. Chappell. She received pre-procedure steroids due to her contrast allergy.     Chest X-Rays 4/11: No acute process seen.    Prior diet: reg/thin    Subjective     Patient awake and cooperative.  Communicated with RN prior to entry.   Patient goals: "I don't know why I can't keep anything down."     Pain/Comfort:  ·  no pain reported orindicated    Respiratory Status: Room air    Objective:     Oral Musculature Evaluation  · Oral Musculature: WFL  · Dentition: upper and lower dentures, uses dentures to eat  · Mucosal Quality: good  · Mandibular Strength and Mobility: WFL  · Oral Labial Strength and Mobility: WFL  · Lingual Strength and Mobility: WFL  · Buccal Strength and Mobility: WFL  · Volitional Cough: elicited  · Volitional Swallow: timely  · Voice Prior to PO Intake: clear    Bedside Swallow Eval:   Consistencies Assessed:  · Thin liquids sip of water   · Patient declining further trials with SLP at this time    Oral Phase:   · WFL    Pharyngeal Phase:   · no overt clinical signs/symptoms of aspiration  · no overt clinical signs/symptoms of pharyngeal dysphagia  · regurgitation of food/liquids   · Facial grimacing and reporting pain/tightness below level of sternal notch  · Burping appreciated  · Patient began gagging and eventually regurgitated frothy phlegm    Compensatory Strategies  · None    Treatment: SLP provided education on SLP recommendations, SLP role, s/s and risks of aspiration, safe swallow precautions, and POC. Patient in agreement with POC. SLP communicated recommendations with MD and " RN.     Assessment:     Marisol Kerr is a 76 y.o. female with suspected GI component impacting swallow. Patient declining trials past water 2/2 nausea and regurgitation of all intake at this time. Patient may benefit from a GI consult 2/2 reporting pain/globus sensation below the level of the sternal notch and noted gagging/regurgitation. ST will follow up to assess further consistencies as able.     Goals:   Multidisciplinary Problems     SLP Goals     Not on file          Multidisciplinary Problems (Resolved)        Problem: SLP    Goal Priority Disciplines Outcome   SLP Goal   (Resolved)     SLP Met                   Plan:     · Patient to be seen:  3 x/week   · Plan of Care expires:     · Plan of Care reviewed with:  patient   · SLP Follow-Up:  Yes       Discharge recommendations:   (likely no ST needs)   Barriers to Discharge:  poor oral intake    Time Tracking:     SLP Treatment Date:   04/12/22  Speech Start Time:  0949  Speech Stop Time:  1002     Speech Total Time (min):  13 min    Billable Minutes: Eval Swallow and Oral Function 13    04/12/2022

## 2022-04-12 NOTE — PROGRESS NOTES
[facility]  Cardiology  Progress Note    Patient Name: Marisol Kerr  MRN: [id number]  Admission Date: 4/8/2022  Hospital Length of Stay: 4 days  Code Status: Full Code   Attending Physician: Karl Ontiveros MD   Primary Care Physician: Khadar Dwyer MD  Expected Discharge Date: 4/12/2022  Principal Problem:NSTEMI (non-ST elevated myocardial infarction)    Subjective:     Hospital Course:   Pt asymptomatic on DAPT and Heparin. Heparin Dc'd upon arrival in setting of hx of rectus sheath hematoma managed by IR after last L heart cath. Euvolemic and managed on home Lasix. Patient originally scheduled to have high risk PCI done inpatient with Dr. Chappell but she will not be scheduled to have the PCI done in the outpatient setting. Additionally, patient with increased sputum production and cough. Concern for microaspirations; SLP consulted. Concern for oropharyngeal vs globus sensation as patient appears to be regurgitating food and water. GI consulted.       Interval History: Patient now scheduled to have high risk PCI done in the outpatient setting. Increased sputum production and regurgitation of fluids and food on SLP evaluation. Will consult GI for possible oropharyngeal dysphagia vs globus sensation.     Review of Systems   Constitutional: Negative for chills, decreased appetite, diaphoresis, fever, malaise/fatigue and weight gain.   HENT: Negative.  Negative for congestion and sore throat.    Eyes:  Negative for blurred vision and double vision.   Cardiovascular:  Negative for chest pain, dyspnea on exertion, irregular heartbeat, leg swelling and palpitations.   Respiratory:  Positive for cough. Negative for hemoptysis, shortness of breath and wheezing.    Hematologic/Lymphatic: Positive for bleeding problem. Bruises/bleeds easily.   Skin: Negative.  Negative for itching and rash.   Musculoskeletal:  Positive for arthritis. Negative for falls and joint pain.   Gastrointestinal:  Positive for  nausea and vomiting. Negative for bloating, abdominal pain, constipation, diarrhea and melena.   Genitourinary: Negative.  Negative for hematuria and urgency.   Neurological:  Negative for dizziness, headaches, light-headedness and numbness.   Psychiatric/Behavioral: Negative.  Negative for altered mental status and depression.    Objective:     Vital Signs (Most Recent):  Temp: 97.8 °F (36.6 °C) (04/12/22 1154)  Pulse: 75 (04/12/22 1154)  Resp: 18 (04/12/22 1154)  BP: 131/61 (04/12/22 1154)  SpO2: (!) 94 % (04/12/22 1154)   Vital Signs (24h Range):  Temp:  [97.6 °F (36.4 °C)-98.3 °F (36.8 °C)] 97.8 °F (36.6 °C)  Pulse:  [60-90] 75  Resp:  [18] 18  SpO2:  [94 %-97 %] 94 %  BP: (124-169)/(56-65) 131/61     Weight: 78.7 kg (173 lb 6.3 oz)  Body mass index is 30.71 kg/m².     SpO2: (!) 94 %  O2 Device (Oxygen Therapy): nasal cannula      Intake/Output Summary (Last 24 hours) at 4/12/2022 1310  Last data filed at 4/12/2022 1000  Gross per 24 hour   Intake 612 ml   Output 2450 ml   Net -1838 ml       Lines/Drains/Airways       Drain  Duration             Female External Urinary Catheter 03/27/22 2000 15 days              Peripheral Intravenous Line  Duration                  Peripheral IV - Single Lumen 04/11/22 0800 20 G Lateral;Right Breast 1 day                    Physical Exam  Vitals and nursing note reviewed.   Constitutional:       Appearance: Normal appearance.   HENT:      Head: Normocephalic and atraumatic.      Nose: Nose normal.      Mouth/Throat:      Mouth: Mucous membranes are moist.      Pharynx: Oropharynx is clear.      Comments: Oozing blister on bottom lip   Eyes:      Extraocular Movements: Extraocular movements intact.   Cardiovascular:      Rate and Rhythm: Normal rate and regular rhythm.      Pulses: Normal pulses.      Heart sounds: Normal heart sounds.   Pulmonary:      Effort: Pulmonary effort is normal.      Breath sounds: Normal breath sounds. No wheezing or rales.   Abdominal:      General:  Bowel sounds are normal.      Palpations: Abdomen is soft.      Tenderness: There is no abdominal tenderness.      Hernia: A hernia (R hernia abdominal hernia, reducable, non-painful) is present.   Musculoskeletal:         General: Normal range of motion.      Cervical back: Normal range of motion and neck supple.      Right lower leg: No edema.      Left lower leg: No edema.   Skin:     General: Skin is warm and dry.   Neurological:      General: No focal deficit present.      Mental Status: She is alert and oriented to person, place, and time.   Psychiatric:         Mood and Affect: Mood normal.         Behavior: Behavior normal.       Significant Labs: CMP   Recent Labs   Lab 04/11/22  0454 04/12/22  0550    138   K 5.1 4.3    104   CO2 26 26   * 106   BUN 43* 52*   CREATININE 1.7* 1.4   CALCIUM 9.7 9.3   PROT 6.3 5.9*   ALBUMIN 2.8* 2.7*   BILITOT 0.5 0.4   ALKPHOS 78 81   AST 12 13   ALT 9* 9*   ANIONGAP 10 8   ESTGFRAFRICA 33.8* 42.7*   EGFRNONAA 29.3* 37.0*    and CBC   Recent Labs   Lab 04/11/22  0454 04/12/22  0550   WBC 8.08 12.49   HGB 10.4* 10.1*   HCT 33.7* 32.5*    173       Significant Imaging: Reviewed    Assessment and Plan:       * NSTEMI (non-ST elevated myocardial infarction)  Acute onset chest pressure, EKG with lateral ST depressions, and troponin 0.06 (mildly elevated) all consistent with NSTEMI. Currently stable and asymptomatic. 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 Outpt plan to follow-up with Dr Chappell for high risk PCI. Troponin peaked at 0.07.     - Start ACS reloaded with ASA/Brilinta, will not continue heparin ggt as pt has hx of rectus sheath hematoma during last L heart cath.   - Continue atorvastatin, metoprolol  - f/u Echo  - SL Nitro PRN for chest pain  - patient NPO at midnight for high risk PCI today as per interventional cardiology. She has received steroid dosing given her contrast  allergy.      Normocytic anemia  Hb 9.8 on arrival and at baseline. Prior rectus sheath hematoma after angiogram 2/2022 requiring inferior epigastric artery embolization. No s/s bleeding and on DAPT.     - Continue home ferrous sulfate  - Type and screen for potential procedure  - Trend CBC daily  - Transfuse to maintain Hb >8 with CAD    Chronic diastolic congestive heart failure  Normal EF 2/2022 and no s/s of exacerbation. TTE on 04/09 with EF 60, hypokinetic basal inferoseptal and inferior hypokinesis.    - Continue home furosemide 40mg qd  - Daily weights, strict I/Os  - Not on MRA or SLGT2; can consider as outpt      Chronic hypoxemic respiratory failure  On NC at home and underlying COPD. No s/s exacerbation.    - Continue home inhaler regimen  - Albuterol nebs PRN  - Supplemental oxygen  - patient w/ increased sputum production, CXR w/ increased bronchial markings but no consolidation or opacities. Concern for microaspirations - SLP consulted, appreciate recommendations. Appears that patient is regurgitating food. Will consult GI for oropharyngeal dysphagia vs globus sensation.     CKD (chronic kidney disease), stage III  Cr 1.3 on arrival with baseline 1.3-1.6.    - Continue home lisinopril  - Trend BMP, Mag, Phos  - Strict I/Os  - Avoid nephrotoxins    Gastroesophageal reflux disease without esophagitis  - Continue home PPI    Essential hypertension, benign  - Continue home amlodipine, lisinopril        VTE Risk Mitigation (From admission, onward)         Ordered     IP VTE HIGH RISK PATIENT  Once         04/08/22 2203     Place sequential compression device  Until discontinued         04/08/22 2203                Estrellita Salinas MD  Cardiology  [facility]

## 2022-04-12 NOTE — PLAN OF CARE
Evaluation completed, plan of care established.   Discharge Recommendation: Home Health    Problem: Occupational Therapy  Goal: Occupational Therapy Goal  Description: Goals to be met by: 5/12/22     Patient will increase functional independence with ADLs by performing:    UE Dressing with Set-up Assistance.  LE Dressing with Stand-by Assistance.  Grooming while standing at sink with Set-up Assistance.  Toileting from toilet with Modified [place] for hygiene and clothing management.   Supine to sit with Modified [place].  Step transfer with Modified [place]  Toilet transfer to toilet with Modified [place].    Outcome: Ongoing, Progressing

## 2022-04-12 NOTE — SUBJECTIVE & OBJECTIVE
Interval History: Patient now scheduled to have high risk PCI done in the outpatient setting. Increased sputum production and regurgitation of fluids and food on SLP evaluation. Will consult GI for possible oropharyngeal dysphagia vs globus sensation.     Review of Systems   Constitutional: Negative for chills, decreased appetite, diaphoresis, fever, malaise/fatigue and weight gain.   HENT: Negative.  Negative for congestion and sore throat.    Eyes:  Negative for blurred vision and double vision.   Cardiovascular:  Negative for chest pain, dyspnea on exertion, irregular heartbeat, leg swelling and palpitations.   Respiratory:  Positive for cough. Negative for hemoptysis, shortness of breath and wheezing.    Hematologic/Lymphatic: Positive for bleeding problem. Bruises/bleeds easily.   Skin: Negative.  Negative for itching and rash.   Musculoskeletal:  Positive for arthritis. Negative for falls and joint pain.   Gastrointestinal:  Positive for nausea and vomiting. Negative for bloating, abdominal pain, constipation, diarrhea and melena.   Genitourinary: Negative.  Negative for hematuria and urgency.   Neurological:  Negative for dizziness, headaches, light-headedness and numbness.   Psychiatric/Behavioral: Negative.  Negative for altered mental status and depression.    Objective:     Vital Signs (Most Recent):  Temp: 97.8 °F (36.6 °C) (04/12/22 1154)  Pulse: 75 (04/12/22 1154)  Resp: 18 (04/12/22 1154)  BP: 131/61 (04/12/22 1154)  SpO2: (!) 94 % (04/12/22 1154)   Vital Signs (24h Range):  Temp:  [97.6 °F (36.4 °C)-98.3 °F (36.8 °C)] 97.8 °F (36.6 °C)  Pulse:  [60-90] 75  Resp:  [18] 18  SpO2:  [94 %-97 %] 94 %  BP: (124-169)/(56-65) 131/61     Weight: 78.7 kg (173 lb 6.3 oz)  Body mass index is 30.71 kg/m².     SpO2: (!) 94 %  O2 Device (Oxygen Therapy): nasal cannula      Intake/Output Summary (Last 24 hours) at 4/12/2022 1310  Last data filed at 4/12/2022 1000  Gross per 24 hour   Intake 612 ml   Output 2450 ml    Net -1838 ml       Lines/Drains/Airways       Drain  Duration             Female External Urinary Catheter 03/27/22 2000 15 days              Peripheral Intravenous Line  Duration                  Peripheral IV - Single Lumen 04/11/22 0800 20 G Lateral;Right Breast 1 day                    Physical Exam  Vitals and nursing note reviewed.   Constitutional:       Appearance: Normal appearance.   HENT:      Head: Normocephalic and atraumatic.      Nose: Nose normal.      Mouth/Throat:      Mouth: Mucous membranes are moist.      Pharynx: Oropharynx is clear.      Comments: Oozing blister on bottom lip   Eyes:      Extraocular Movements: Extraocular movements intact.   Cardiovascular:      Rate and Rhythm: Normal rate and regular rhythm.      Pulses: Normal pulses.      Heart sounds: Normal heart sounds.   Pulmonary:      Effort: Pulmonary effort is normal.      Breath sounds: Normal breath sounds. No wheezing or rales.   Abdominal:      General: Bowel sounds are normal.      Palpations: Abdomen is soft.      Tenderness: There is no abdominal tenderness.      Hernia: A hernia (R hernia abdominal hernia, reducable, non-painful) is present.   Musculoskeletal:         General: Normal range of motion.      Cervical back: Normal range of motion and neck supple.      Right lower leg: No edema.      Left lower leg: No edema.   Skin:     General: Skin is warm and dry.   Neurological:      General: No focal deficit present.      Mental Status: She is alert and oriented to person, place, and time.   Psychiatric:         Mood and Affect: Mood normal.         Behavior: Behavior normal.       Significant Labs: CMP   Recent Labs   Lab 04/11/22  0454 04/12/22  0550    138   K 5.1 4.3    104   CO2 26 26   * 106   BUN 43* 52*   CREATININE 1.7* 1.4   CALCIUM 9.7 9.3   PROT 6.3 5.9*   ALBUMIN 2.8* 2.7*   BILITOT 0.5 0.4   ALKPHOS 78 81   AST 12 13   ALT 9* 9*   ANIONGAP 10 8   ESTGFRAFRICA 33.8* 42.7*   EGFRNONAA  29.3* 37.0*    and CBC   Recent Labs   Lab 04/11/22  0454 04/12/22  0550   WBC 8.08 12.49   HGB 10.4* 10.1*   HCT 33.7* 32.5*    173       Significant Imaging: Reviewed

## 2022-04-12 NOTE — CONSULTS
[facility]  Gastroenterology  Consult Note    Patient Name: Marisol Kerr  MRN: [id number]  Admission Date: 4/8/2022  Hospital Length of Stay: 4 days  Code Status: Full Code   Attending Provider: Karl Ontiveros MD   Consulting Provider: Faustina Martinez MD  Primary Care Physician: Khadar Dwyer MD  Principal Problem:NSTEMI (non-ST elevated myocardial infarction)    Inpatient consult to Gastroenterology  Consult performed by: Faustina Martinez MD  Consult ordered by: Estrellita Salinas MD        Subjective:     HPI: Marisol Kerr is a 76 y.o. female with history of DM, HTN, HLD, CKD, who was admitted for chest pain from rehab.  She was recently admitted in February for rectus sheath hematoma as a complication of left heart catheterization, underwent IR embolization.  At the nursing home she developed chest pain for which she was admitted to the hospital.  She is planned to undergo high risk PCI as outpatient.  GI consulted for dysphagia.  The patient has been having cough since she has been at the nursing home, over the past 2-3 days she has been having coughing episodes after she eats associated with dysphagia and odynophagia.  This has never happened to her before.  It involves both food and liquids and she is able to keep some food down, although the more she eats she has to throw up.  She also reports nausea that she has also had in the outpatient setting but it was controlled with antiemetics at the time.  She has never had an EGD.  She is on dual anti-platelet therapy for recent STEMI.        Past Medical History:   Diagnosis Date    CAD (coronary artery disease)     Cancer     colon    CHF (congestive heart failure)     Colitis     Colon cancer     COPD (chronic obstructive pulmonary disease)     Decreased hearing     Diabetes mellitus     Diabetes mellitus, type 2     Hypercholesterolemia     Hypertension     Hypoxemia     Insomnia     Obesity     Osteoarthritis         Past Surgical History:   Procedure Laterality Date    ANGIOGRAM, CORONARY, WITH LEFT HEART CATHETERIZATION N/A 2/10/2022    Procedure: Angiogram, Coronary, with Left Heart Cath;  Surgeon: Cristian Benjamin MD;  Location: [facility] CATH/EP LAB;  Service: Cardiology;  Laterality: N/A;    CARDIAC CATHETERIZATION      CHOLECYSTECTOMY      COLECTOMY      CORONARY ANGIOPLASTY      CORONARY STENT PLACEMENT      EXTERNAL EAR SURGERY      EYE SURGERY      FLEXIBLE SIGMOIDOSCOPY N/A 2019    Procedure: SIGMOIDOSCOPY, FLEXIBLE;  Surgeon: Biju Kramer III, MD;  Location: [facility] ENDO;  Service: Endoscopy;  Laterality: N/A;    HYSTERECTOMY      partial       Family History   Problem Relation Age of Onset    Febrile seizures Mother     Heart failure Father        Social History     Socioeconomic History    Marital status:    Tobacco Use    Smoking status: Former Smoker     Packs/day: 3.00     Years: 50.00     Pack years: 150.00     Types: Cigarettes     Start date: 3/30/1964     Quit date: 2006     Years since quittin.1    Smokeless tobacco: Never Used    Tobacco comment: ex smoker 15 years   Substance and Sexual Activity    Alcohol use: Yes    Drug use: No    Sexual activity: Never       No current facility-administered medications on file prior to encounter.     Current Outpatient Medications on File Prior to Encounter   Medication Sig Dispense Refill    acetaminophen (TYLENOL) 325 MG tablet Take 2 tablets (650 mg total) by mouth every 4 (four) hours as needed.  0    albuterol (VENTOLIN HFA) 90 mcg/actuation inhaler Inhale 2 puffs into the lungs every 6 (six) hours as needed for Wheezing or Shortness of Breath. Rescue 36 g 3    atorvastatin (LIPITOR) 80 MG tablet Take 80 mg by mouth once daily.      ergocalciferol (VITAMIN D2) 50,000 unit Cap Take 1 capsule (50,000 Units total) by mouth every 7 days. 12 capsule 1    ferrous sulfate 325 (65 FE) MG EC tablet Take 1 tablet (325  mg total) by mouth once daily.  0    fish oil-omega-3 fatty acids 300-1,000 mg capsule Take 1 capsule by mouth every morning.      ipratropium-albuteroL (COMBIVENT RESPIMAT)  mcg/actuation inhaler Inhale 2 puffs into the lungs every 4 (four) hours as needed for Shortness of Breath. Rescue 12 g 3    metoprolol succinate (TOPROL-XL) 50 MG 24 hr tablet Take 1 tablet (50 mg total) by mouth once daily. 45 tablet 1    nitroGLYCERIN 0.4 MG/DOSE TL SPRY (NITROLINGUAL) 400 mcg/spray spray USE ONE SPRAY UNDER THE TONGUE EVERY 5 MINUTES AS NEEDED FOR CHEST PAIN.  DO NOT EXCEED A TOTAL OF 3 SPRAYS IN 15 MINUTES 12 g 0    pantoprazole (PROTONIX) 40 MG tablet Take 1 tablet (40 mg total) by mouth once daily. 30 tablet 11    triamcinolone acetonide 0.1% (KENALOG) 0.1 % cream Apply topically 2 (two) times daily. For legs. 453 g 1    [DISCONTINUED] aspirin (ECOTRIN) 81 MG EC tablet Take 81 mg by mouth every morning.       amLODIPine (NORVASC) 10 MG tablet Take 1 tablet (10 mg total) by mouth once daily. 30 tablet 11    coenzyme Q10 100 mg capsule Take 100 mg by mouth every morning.      fluticasone-salmeterol diskus inhaler 250-50 mcg Inhale 1 puff into the lungs 2 (two) times daily. 3 each 1    furosemide (LASIX) 20 MG tablet Take 2 tablets (40 mg total) by mouth once daily. 45 tablet 1    gabapentin (NEURONTIN) 100 MG capsule Take 1 capsule (100 mg total) by mouth every evening. 30 capsule 3    isosorbide mononitrate (IMDUR) 30 MG 24 hr tablet Take 1 tablet (30 mg total) by mouth once daily. 30 tablet 3    lisinopriL 10 MG tablet Take 1 tablet (10 mg total) by mouth once daily. HOLD UNTIL OTHERWISE DIRECTED BY PRIMARY CARE PROVIDER 90 tablet 3    NARCAN 4 mg/actuation Spry       NON FORMULARY MEDICATION Take 1 tablet by mouth once daily. Prenatal vitamin      ondansetron (ZOFRAN-ODT) 4 MG TbDL Take 2 tablets (8 mg total) by mouth every 6 (six) hours as needed. 100 tablet 3    temazepam (RESTORIL) 15 mg Cap  Take 15 mg by mouth nightly as needed.      umeclidinium (INCRUSE ELLIPTA) 62.5 mcg/actuation inhalation capsule Inhale 62.5 mcg into the lungs every morning. Controller 30 each 11    vit C/E/Zn/coppr/lutein/zeaxan (PRESERVISION AREDS-2 ORAL) Take 1 tablet by mouth once daily.      [DISCONTINUED] benazepriL (LOTENSIN) 40 MG tablet Take 1 tablet (40 mg total) by mouth once daily. 90 tablet 1    [DISCONTINUED] cholestyramine (QUESTRAN) 4 gram packet Take 1 packet (4 g total) by mouth 2 (two) times daily. 180 packet 3    [DISCONTINUED] lovastatin (MEVACOR) 40 MG tablet Take 1 tablet (40 mg total) by mouth every other day. 45 tablet 3    [DISCONTINUED] nitroGLYCERIN 0.2 mg/hr TD PT24 (NITRODUR) 0.2 mg/hr APPLY 1 PATCH TOPICALLY ONCE DAILY TO LEG. 90 patch 1       Review of patient's allergies indicates:   Allergen Reactions    Iodinated contrast media     Strawberries [strawberry] Hives and Itching       Review of Systems   Constitutional: Negative.    HENT: Negative.    Eyes: Negative.    Respiratory: Negative.    Cardiovascular: Negative.    Gastrointestinal: Positive for nausea and vomiting. Negative for abdominal pain, blood in stool, constipation, diarrhea and melena.        Dysphagia   Genitourinary: Negative.    Musculoskeletal: Negative.    Neurological: Negative.    Psychiatric/Behavioral: Negative.         Objective:     Vitals:    04/12/22 1348   BP:    Pulse: 62   Resp: 18   Temp:          Constitutional:  not in acute distress and well developed  HENT: Head: Normal, normocephalic, atraumatic.  Eyes: conjunctiva clear and sclera nonicteric  Cardiovascular: regular rate and rhythm  Respiratory: normal chest expansion & respiratory effort   and no accessory muscle use  GI: soft, non-tender, without masses or organomegaly  Musculoskeletal: no muscular tenderness noted  Skin: normal color  Neurological: alert, oriented x3  Psychiatric: mood and affect are within normal limits, pt is a good historian; no  memory problems were noted    Significant Labs:  Recent Labs   Lab 04/10/22  0853 04/11/22  0454 04/12/22  0550   HGB 11.1* 10.4* 10.1*       Lab Results   Component Value Date    WBC 12.49 04/12/2022    HGB 10.1 (L) 04/12/2022    HCT 32.5 (L) 04/12/2022    MCV 92 04/12/2022     04/12/2022       Lab Results   Component Value Date     04/12/2022    K 4.3 04/12/2022     04/12/2022    CO2 26 04/12/2022    BUN 52 (H) 04/12/2022    CREATININE 1.4 04/12/2022    CALCIUM 9.3 04/12/2022    ANIONGAP 8 04/12/2022    ESTGFRAFRICA 42.7 (A) 04/12/2022    EGFRNONAA 37.0 (A) 04/12/2022       Lab Results   Component Value Date    ALT 9 (L) 04/12/2022    AST 13 04/12/2022    ALKPHOS 81 04/12/2022    BILITOT 0.4 04/12/2022       Lab Results   Component Value Date    INR 1.0 04/09/2022    INR 1.0 04/08/2022    INR 1.1 04/07/2022       Significant Imaging:  Reviewed pertinent radiology findings.       Assessment/Plan:     Marisol Kerr is a 76 y.o. female with history of DM, HTN, HLD, CKD, who was admitted for chest pain from rehab. GI consulted for dysphagia.    Problem List:  1. Dysphagia  2. Cough  3. CAD, on DAPT    The patient developed dysphagia over the past 2-3 days, associated with cough, nausea and vomiting.  It is unlikely that she has mechanical obstruction that developed over this short of a time, however will evaluate with EGD on Thursday.  Would recommend further evaluation of cough per primary team.  She might also benefit from antiemetics.    Recommendations:  - plan for EGD on Thursday for dysphagia evaluation, will not be able to dilate if stenosis is found since she is on DAPT  - antiemetics  - cough workup per primary team    Thank you for involving us in the care of Marisol Kerr. Please call with any additional questions, concerns or changes in the patient's clinical status. We will continue to follow.    Faustina Martinez MD  Gastroenterology Fellow PGY V  [facility]

## 2022-04-12 NOTE — PLAN OF CARE
BG monitored. Ointment applied to lip and stopped lip bleeding. Pt educated on fall risk and remained free from falls/trauma/injury. Denies chest pain, SOB, palpitations, dizziness, pain, or discomfort. Plan of care reviewed with pt, all questions answered. Bed locked in lowest position, call bell within reach, no acute distress noted, will continue to monitor.

## 2022-04-12 NOTE — SUBJECTIVE & OBJECTIVE
Interval History: No acute issues overnight. Patient seen and examined at bedside this morning. Denies angina. She is on chronic supplemental O2 therapy due to COPD.     Objective:     Vital Signs (Most Recent):  Temp: 97.8 °F (36.6 °C) (04/12/22 1154)  Pulse: 75 (04/12/22 1154)  Resp: 18 (04/12/22 1154)  BP: 131/61 (04/12/22 1154)  SpO2: (!) 94 % (04/12/22 1154)   Vital Signs (24h Range):  Temp:  [97.6 °F (36.4 °C)-98.3 °F (36.8 °C)] 97.8 °F (36.6 °C)  Pulse:  [60-90] 75  Resp:  [18] 18  SpO2:  [94 %-97 %] 94 %  BP: (124-169)/(56-65) 131/61     Weight: 78.7 kg (173 lb 6.3 oz)  Body mass index is 30.71 kg/m².    SpO2: (!) 94 %  O2 Device (Oxygen Therapy): nasal cannula      Intake/Output Summary (Last 24 hours) at 4/12/2022 1334  Last data filed at 4/12/2022 1000  Gross per 24 hour   Intake 612 ml   Output 2250 ml   Net -1638 ml       Lines/Drains/Airways       Drain  Duration             Female External Urinary Catheter 03/27/22 2000 15 days              Peripheral Intravenous Line  Duration                  Peripheral IV - Single Lumen 04/11/22 0800 20 G Lateral;Right Breast 1 day                    Physical Exam  Constitutional:       General: She is not in acute distress.     Appearance: She is well-developed.   HENT:      Head: Normocephalic and atraumatic.   Eyes:      Pupils: Pupils are equal, round, and reactive to light.   Neck:      Vascular: No JVD.   Cardiovascular:      Rate and Rhythm: Normal rate and regular rhythm.      Pulses:           Radial pulses are 2+ on the right side and 2+ on the left side.        Femoral pulses are 2+ on the right side and 2+ on the left side.     Heart sounds: No murmur heard.    No friction rub. No gallop.   Pulmonary:      Effort: Pulmonary effort is normal. No respiratory distress.      Breath sounds: Normal breath sounds. No wheezing or rales.   Chest:      Chest wall: No tenderness.   Abdominal:      General: Bowel sounds are normal. There is no distension.       Palpations: Abdomen is soft.      Tenderness: There is no abdominal tenderness.   Musculoskeletal:         General: Normal range of motion.      Cervical back: Normal range of motion and neck supple.   Lymphadenopathy:      Cervical: No cervical adenopathy.   Skin:     General: Skin is warm and dry.      Findings: No erythema.   Neurological:      Mental Status: She is alert and oriented to person, place, and time.   Psychiatric:         Behavior: Behavior normal.         Thought Content: Thought content normal.         Judgment: Judgment normal.       Significant Labs: All pertinent lab results from the last 24 hours have been reviewed.    Significant Imaging:  Reviewed in Epic.

## 2022-04-12 NOTE — CARE UPDATE
Cardiology Update Note    Spoke with patient's daughter Marisol Kerr (same name) over the phone and gave an update regarding care plan from Interventional Cardiology standpoint. All questions answered, NOK was appreciative of update.     Please call with questions or concerns.     Zoran Martin MD  Cardiology PGY4  [facility]

## 2022-04-12 NOTE — PLAN OF CARE
[facility] - Cardiology Stepdown      HOME HEALTH ORDERS  FACE TO FACE ENCOUNTER    Patient Name: Marisol Kerr  YOB: 1945    PCP: Khadar Dwyer MD   PCP Address: [address] [place] [address] [address] [facility] / [facility]*  PCP Phone Number: [phone]  PCP Fax: [phone]    Encounter Date: 4/8/22    Admit to Home Health    Diagnoses:  Active Hospital Problems    Diagnosis  POA    *NSTEMI (non-ST elevated myocardial infarction) [I21.4]  Yes    Normocytic anemia [D64.9]  Yes    Chronic diastolic congestive heart failure [I50.32]  Yes    Chronic hypoxemic respiratory failure [J96.11]  Yes     3 L at home. Is on 3L during first day of admission.       CKD (chronic kidney disease), stage III [N18.30]  Yes    Gastroesophageal reflux disease without esophagitis [K21.9]  Yes    Essential hypertension, benign [I10]  Yes      Resolved Hospital Problems   No resolved problems to display.       Follow Up Appointments:  Future Appointments   Date Time Provider Department Center   4/18/2022  8:15 AM [id number] [facility]   4/27/2022 11:20 AM Antonieta Marquez NP [facility]       Allergies:  Review of patient's allergies indicates:   Allergen Reactions    Iodinated contrast media     Strawberries [strawberry] Hives and Itching       Medications: Review discharge medications with patient and family and provide education.    Current Facility-Administered Medications   Medication Dose Route Frequency Provider Last Rate Last Admin    acetaminophen tablet 650 mg  650 mg Oral Q4H PRN García Johnson MD   650 mg at 04/11/22 2111    albuterol inhaler 2 puff  2 puff Inhalation Q6H PRN García Johnson MD        albuterol-ipratropium 2.5 mg-0.5 mg/3 mL nebulizer solution 3 mL  3 mL Nebulization Q6H Estrellita Salinas MD        amLODIPine tablet 10 mg  10 mg Oral Daily García Johnson MD   10 mg at 04/12/22 0823    aspirin EC tablet 81 mg  81 mg Oral QAM García Johnson MD   81  mg at 04/12/22 0605    atorvastatin tablet 80 mg  80 mg Oral Daily García Johnson MD   80 mg at 04/12/22 0822    benzonatate capsule 100 mg  100 mg Oral TID PRN Hermelindo Haney MD   100 mg at 04/11/22 2111    coenzyme Q10 100 mg  100 mg Oral QAM García Johnson MD   100 mg at 04/12/22 0605    dextrose 10% bolus 125 mL  12.5 g Intravenous PRN Karl Ontiveros MD        dextrose 10% bolus 250 mL  25 g Intravenous PRN Karl Ontiveros MD        diclofenac sodium 1 % gel 4 g  4 g Topical (Top) TID PRN Amari Argueta MD   4 g at 04/10/22 1311    ergocalciferol capsule 50,000 Units  50,000 Units Oral Q7 Days García Johnson MD   50,000 Units at 04/09/22 0827    ferrous sulfate tablet 1 each  1 tablet Oral Daily García Johnson MD   1 each at 04/12/22 0823    fluticasone furoate-vilanteroL 100-25 mcg/dose diskus inhaler 1 puff  1 puff Inhalation Daily García Johnson MD   1 puff at 04/11/22 0844    furosemide tablet 40 mg  40 mg Oral Daily García Johnson MD   40 mg at 04/12/22 0823    gabapentin capsule 100 mg  100 mg Oral QHS García Johnson MD   100 mg at 04/11/22 2111    glucagon (human recombinant) injection 1 mg  1 mg Intramuscular PRN Amari Argueta MD        glucose chewable tablet 16 g  16 g Oral PRN Amari Argueta MD        glucose chewable tablet 24 g  24 g Oral PRN Amari Argueta MD        guaiFENesin 12 hr tablet 600 mg  600 mg Oral BID Estrellita Salinas MD   600 mg at 04/12/22 1010    insulin aspart U-100 pen 0-5 Units  0-5 Units Subcutaneous QID (AC + HS) PRN Amari Argueta MD   2 Units at 04/11/22 1619    isosorbide mononitrate 24 hr tablet 30 mg  30 mg Oral Daily García Johnson MD   30 mg at 04/12/22 0823    lisinopriL tablet 10 mg  10 mg Oral Daily García Johnson MD   10 mg at 04/12/22 0823    metoprolol succinate (TOPROL-XL) 24 hr tablet 50 mg  50 mg Oral Daily García Johnson MD   50 mg at 04/12/22 0823    nitroGLYCERIN SL tablet 0.4 mg  0.4 mg Sublingual Q5 Min PRN  García Johnson MD        omega-3 fatty acids-fish oil 340-1,000 mg capsule 1 capsule  1 capsule Oral QAM García Johnson MD   1 capsule at 04/12/22 0605    ondansetron injection 4 mg  4 mg Intravenous Q8H PRN García Johnson MD   4 mg at 04/10/22 1311    pantoprazole EC tablet 40 mg  40 mg Oral Daily García Johnson MD   40 mg at 04/12/22 0823    polyethylene glycol packet 17 g  17 g Oral Daily García Johnson MD        sodium chloride 0.9% flush 10 mL  10 mL Intravenous PRN García Johnson MD        ticagrelor tablet 90 mg  90 mg Oral BID García Johnson MD   90 mg at 04/12/22 0823    tiotropium bromide 2.5 mcg/actuation inhaler 2 puff  2 puff Inhalation Daily García Johnson MD   2 puff at 04/11/22 0844    triamcinolone acetonide 0.1% cream   Topical (Top) BID García Johnson MD   Given at 04/11/22 2112    white petrolatum 41 % ointment   Topical (Top) PRN Estrellita Salinas MD   Given at 04/11/22 2111    zolpidem tablet 5 mg  5 mg Oral Nightly PRN García Johnson MD   5 mg at 04/11/22 2111     Current Discharge Medication List      START taking these medications    Details   cimetidine (TAGAMET) 300 MG tablet Take 1 tablet (300 mg total) by mouth every 6 (six) hours. Take 1 tablet (300 mg total) by mouth starting at 6 PM on Sunday April 17, 2022 (the evening before your angiogram procedure). Then take 1 tablet at 12 AM, then take 1 tablet at 6 AM on Monday April 18th.  Qty: 3 tablet, Refills: 0      diphenhydrAMINE (BENADRYL) 50 MG capsule Take 1 capsule (50 mg total) by mouth every 6 (six) hours. Take 1 capsule (50 mg total) by mouth starting at 6 PM on Sunday April 17, 2022 (the evening before your angiogram procedure). Then take 1 capsule at 12 AM, then take 1 capsule at 6 AM on Monday April 18th. for 3 doses  Qty: 3 capsule, Refills: 0      guaiFENesin (MUCINEX) 600 mg 12 hr tablet Take 1 tablet (600 mg total) by mouth 2 (two) times daily. for 14 days  Qty: 28 tablet, Refills: 0      predniSONE  (DELTASONE) 50 MG Tab Take 1 tablet (50 mg total) by mouth every 6 (six) hours. Take 1 tablet (50 mg total) by mouth starting at 6 PM on Sunday April 17, 2022 (the evening before your angiogram procedure). Then take 1 tablet at 12 AM, then take 1 tablet at 6 AM on Monday April 18th.  Qty: 3 tablet, Refills: 0         CONTINUE these medications which have CHANGED    Details   ticagrelor (BRILINTA) 90 mg tablet Take 1 tablet (90 mg total) by mouth 2 (two) times a day.  Qty: 60 tablet, Refills: 11         CONTINUE these medications which have NOT CHANGED    Details   acetaminophen (TYLENOL) 325 MG tablet Take 2 tablets (650 mg total) by mouth every 4 (four) hours as needed.  Refills: 0      albuterol (VENTOLIN HFA) 90 mcg/actuation inhaler Inhale 2 puffs into the lungs every 6 (six) hours as needed for Wheezing or Shortness of Breath. Rescue  Qty: 36 g, Refills: 3    Associated Diagnoses: Panlobular emphysema      aspirin (ECOTRIN) 81 MG EC tablet Take 81 mg by mouth every morning.       atorvastatin (LIPITOR) 80 MG tablet Take 80 mg by mouth once daily.      ergocalciferol (VITAMIN D2) 50,000 unit Cap Take 1 capsule (50,000 Units total) by mouth every 7 days.  Qty: 12 capsule, Refills: 1    Associated Diagnoses: Vitamin D deficiency      ferrous sulfate 325 (65 FE) MG EC tablet Take 1 tablet (325 mg total) by mouth once daily.  Refills: 0      fish oil-omega-3 fatty acids 300-1,000 mg capsule Take 1 capsule by mouth every morning.      ipratropium-albuteroL (COMBIVENT RESPIMAT)  mcg/actuation inhaler Inhale 2 puffs into the lungs every 4 (four) hours as needed for Shortness of Breath. Rescue  Qty: 12 g, Refills: 3    Comments: Please consider 90 day supplies to promote better adherence  Associated Diagnoses: Chronic hypoxemic respiratory failure      metoprolol succinate (TOPROL-XL) 50 MG 24 hr tablet Take 1 tablet (50 mg total) by mouth once daily.  Qty: 45 tablet, Refills: 1    Comments: .  Associated  Diagnoses: Essential hypertension, benign      nitroGLYCERIN 0.4 MG/DOSE TL SPRY (NITROLINGUAL) 400 mcg/spray spray USE ONE SPRAY UNDER THE TONGUE EVERY 5 MINUTES AS NEEDED FOR CHEST PAIN.  DO NOT EXCEED A TOTAL OF 3 SPRAYS IN 15 MINUTES  Qty: 12 g, Refills: 0      pantoprazole (PROTONIX) 40 MG tablet Take 1 tablet (40 mg total) by mouth once daily.  Qty: 30 tablet, Refills: 11      triamcinolone acetonide 0.1% (KENALOG) 0.1 % cream Apply topically 2 (two) times daily. For legs.  Qty: 453 g, Refills: 1    Associated Diagnoses: Venous stasis dermatitis of both lower extremities      amLODIPine (NORVASC) 10 MG tablet Take 1 tablet (10 mg total) by mouth once daily.  Qty: 30 tablet, Refills: 11    Comments: .      cholestyramine (QUESTRAN) 4 gram packet Take 1 packet (4 g total) by mouth 2 (two) times daily.  Qty: 180 packet, Refills: 3      coenzyme Q10 100 mg capsule Take 100 mg by mouth every morning.      fluticasone-salmeterol diskus inhaler 250-50 mcg Inhale 1 puff into the lungs 2 (two) times daily.  Qty: 3 each, Refills: 1      furosemide (LASIX) 20 MG tablet Take 2 tablets (40 mg total) by mouth once daily.  Qty: 45 tablet, Refills: 1    Associated Diagnoses: Essential hypertension, benign      gabapentin (NEURONTIN) 100 MG capsule Take 1 capsule (100 mg total) by mouth every evening.  Qty: 30 capsule, Refills: 3      isosorbide mononitrate (IMDUR) 30 MG 24 hr tablet Take 1 tablet (30 mg total) by mouth once daily.  Qty: 30 tablet, Refills: 3      lisinopriL 10 MG tablet Take 1 tablet (10 mg total) by mouth once daily. HOLD UNTIL OTHERWISE DIRECTED BY PRIMARY CARE PROVIDER  Qty: 90 tablet, Refills: 3    Comments: .      NARCAN 4 mg/actuation Spry       nitroGLYCERIN 0.2 mg/hr TD PT24 (NITRODUR) 0.2 mg/hr APPLY 1 PATCH TOPICALLY ONCE DAILY TO LEG.  Qty: 90 patch, Refills: 1      ondansetron (ZOFRAN-ODT) 4 MG TbDL Take 2 tablets (8 mg total) by mouth every 6 (six) hours as needed.  Qty: 100 tablet, Refills: 3     Associated Diagnoses: Gastroesophageal reflux disease without esophagitis      temazepam (RESTORIL) 15 mg Cap Take 15 mg by mouth nightly as needed.      umeclidinium (INCRUSE ELLIPTA) 62.5 mcg/actuation inhalation capsule Inhale 62.5 mcg into the lungs every morning. Controller  Qty: 30 each, Refills: 11    Associated Diagnoses: Chronic hypoxemic respiratory failure         STOP taking these medications       benazepriL (LOTENSIN) 40 MG tablet Comments:   Reason for Stopping:         lovastatin (MEVACOR) 40 MG tablet Comments:   Reason for Stopping:                 I have seen and examined this patient within the last 30 days. My clinical findings that support the need for the home health skilled services and home bound status are the following:no   Weakness/numbness causing balance and gait disturbance due to Coronary Heart Disease and Weakness/Debility making it taxing to leave home.     Diet:   cardiac diet and 2 gram sodium diet    Labs:  Report Lab results to PCP.    Referrals/ Consults  Physical Therapy to evaluate and treat. Evaluate for home safety and equipment needs; Establish/upgrade home exercise program. Perform / instruct on therapeutic exercises, gait training, transfer training, and Range of Motion.  Occupational Therapy to evaluate and treat. Evaluate home environment for safety and equipment needs. Perform/Instruct on transfers, ADL training, ROM, and therapeutic exercises.   to evaluate for community resources/long-range planning.  Aide to provide assistance with personal care, ADLs, and vital signs.    Activities:   activity as tolerated    Nursing:   Agency to admit patient within 24 hours of hospital discharge unless specified on physician order or at patient request    SN to complete comprehensive assessment including routine vital signs. Instruct on disease process and s/s of complications to report to MD. Review/verify medication list sent home with the patient at time of  discharge  and instruct patient/caregiver as needed. Frequency may be adjusted depending on start of care date.     Skilled nurse to perform up to 3 visits PRN for symptoms related to diagnosis    Notify MD if SBP > 160 or < 90; DBP > 90 or < 50; HR > 120 or < 50; Temp > 101; O2 < 88%    Ok to schedule additional visits based on staff availability and patient request on consecutive days within the home health episode.    When multiple disciplines ordered:    Start of Care occurs on Sunday - Wednesday schedule remaining discipline evaluations as ordered on separate consecutive days following the start of care.    Thursday SOC -schedule subsequent evaluations Friday and Monday the following week.     Friday - Saturday SOC - schedule subsequent discipline evaluations on consecutive days starting Monday of the following week.    For all post-discharge communication and subsequent orders please contact patient's primary care physician.       Home Health Aide:  Nursing Three times weekly, Physical Therapy Three times weekly, Occupational Therapy Three times weekly, Medical Social Work Three times weekly and Home Health Aide Three times weekly      I certify that this patient is confined to her home and needs intermittent skilled nursing care, physical therapy and occupational therapy.

## 2022-04-12 NOTE — TELEPHONE ENCOUNTER
----- Message from Ronda Winter sent at 4/12/2022  2:31 PM CDT -----  Pt no showed her hfu appt today

## 2022-04-12 NOTE — PT/OT/SLP EVAL
Physical Therapy Evaluation    Patient Name:  Marisol Kerr   MRN:  [id number]  Admit Date: 4/8/2022  Admitting Diagnosis:  NSTEMI (non-ST elevated myocardial infarction)  Length of Stay: 4 days  Recent Surgery: * No surgery found *      Recommendations:     Discharge Recommendations:  home health PT   Discharge Equipment Recommendations: none   Barriers to discharge: None    Assessment:     Marisol Kerr is a 76 y.o. female admitted with a medical diagnosis of NSTEMI (non-ST elevated myocardial infarction). Pt admitted from SNF. Pt demo'd good mobility today. Pt was limited due to frequent coughing and nausea. Pt was able to sit EOB, stand, and ambulate a short distance in the room with a RW with SBA. Recommend HHPT once pt is medically stable for discharge.    Problem List: weakness, impaired endurance, impaired self care skills, impaired functional mobilty, impaired cardiopulmonary response to activity  Rehab Prognosis: Good; patient would benefit from acute skilled PT services to address these deficits and reach maximum level of function.      Plan:     During this hospitalization, patient to be seen 3 x/week to address the identified rehab impairments via gait training, therapeutic activities, therapeutic exercises, neuromuscular re-education and progress towards the established goals.    · Plan of Care Expires:  05/10/22    Subjective   Communicated with RN prior to session.  Patient found HOB elevated upon PT entry to room, agreeable to evaluation. Marisol Kerr's alone during session.    Chief Complaint: No chief complaint on file.    Patient/Family Comments/goals: to get better and return home   Pain/Comfort:  · Pain Rating 1: 7/10  · Location 1:  (throat)  · Pain Addressed 1: Reposition, Distraction  · Pain Rating Post-Intervention 1: 7/10    Living Environment:  The patient lives alone, [facility], [address], [place]. Does not drive. Pt now has daughter, son in law, and grandchild living in her house until they buy a  "home.  Prior to admission, patients level of function was modified independent with use of canes, rollator does not fit into bathroom.  Equipment used at home: shower chair,rollator,cane, QC, transport chair.  DME owned (not currently used): none.  Upon discharge, patient will have assistance from family support.    Objective:   Patient found with: telemetry, peripheral IV, PureWick, oxygen     General Precautions: Standard, Cardiac fall   Orthopedic Precautions:N/A   Braces: N/A   Oxygen Device: Nasal Cannula   Vitals: /61 (BP Location: Right arm, Patient Position: Lying)   Pulse 75   Temp 97.8 °F (36.6 °C) (Oral)   Resp 18   Ht 5' 3" (1.6 m)   Wt 78.7 kg (173 lb 6.3 oz)   SpO2 (!) 94%   BMI 30.71 kg/m²     Exams:  · Cognition:   · Alert and Cooperative  · Ox4  · Command following: Follows multistep  commands  · Fluency: clear/fluent    · RLE ROM: WFL  · RLE Strength: WFL  · LLE ROM: WFL  · LLE Strength: WFL    Outcome Measures:  AM-PAC 6 CLICK MOBILITY  Turning over in bed (including adjusting bedclothes, sheets and blankets)?: 4  Sitting down on and standing up from a chair with arms (e.g., wheelchair, bedside commode, etc.): 4  Moving from lying on back to sitting on the side of the bed?: 4  Moving to and from a bed to a chair (including a wheelchair)?: 3  Need to walk in hospital room?: 3  Climbing 3-5 steps with a railing?: 3  Basic Mobility Total Score: 21     Functional Mobility:  Additional staff present: OT  Bed Mobility:  · Supine to Sit: stand by assistance; HOB elevated  · Scooting anteriorly to EOB to have both feet planted on floor: stand by assistance  · Sit to Supine: stand by assistance; HOB elevated    Sitting Balance at Edge of Bed:   Assistance Level Required: Stand-by Assistance   Time: 8 minutes    Transfers:   · Sit <> Stand Transfer: stand by assistance with rolling walker from EOB      Gait:   · Patient ambulated: 15ft    · Patient required: standy by assistance  · Patient " used: rolling walker  · Gait Pattern observed: reciprocal gait  · Gait Deviation(s): decreased esther  · Impairments due to: impaired balance, decreased strength and decreased endurance  · Comments:   · Verbal cuing for pacing and deep breathing       Therapeutic Activities, Exercises, & Education:   Educated pt on PT role/POC  Educated pt on importance of OOB activity and daily ambulation   Pt verbalized understanding    Chair transfer deferred due to pt feeling nauseous.        Patient left HOB elevated with all lines intact, call button in reach and RN notified.    GOALS:   Multidisciplinary Problems     Physical Therapy Goals        Problem: Physical Therapy    Goal Priority Disciplines Outcome Goal Variances Interventions   Physical Therapy Goal     PT, PT/OT Ongoing, Progressing     Description: Goals to be met by: 2022    Patient will increase functional independence with mobility by performin. Gait  x 25 feet with Supervision using Rolling Walker.   2. Stand for 3 minutes with Supervision using Rolling Walker  3. Lower extremity exercise program x15 reps per handout, with independence                     History:     Past Medical History:   Diagnosis Date    CAD (coronary artery disease)     Cancer     colon    CHF (congestive heart failure)     Colitis     Colon cancer     COPD (chronic obstructive pulmonary disease)     Decreased hearing     Diabetes mellitus     Diabetes mellitus, type 2     Hypercholesterolemia     Hypertension     Hypoxemia     Insomnia     Obesity     Osteoarthritis        Past Surgical History:   Procedure Laterality Date    ANGIOGRAM, CORONARY, WITH LEFT HEART CATHETERIZATION N/A 2/10/2022    Procedure: Angiogram, Coronary, with Left Heart Cath;  Surgeon: Cristian Benjamin MD;  [facility] C[facility]EP LAB;  Service: Cardiology;  Laterality: N/A;    CARDIAC CATHETERIZATION      CHOLECYSTECTOMY      COLECTOMY      CORONARY ANGIOPLASTY      CORONARY  STENT PLACEMENT      EXTERNAL EAR SURGERY      EYE SURGERY      FLEXIBLE SIGMOIDOSCOPY N/A 9/19/2019    Procedure: SIGMOIDOSCOPY, FLEXIBLE;  Surgeon: Biju Kramer III, MD;  Location: [facility];  Service: Endoscopy;  Laterality: N/A;    HYSTERECTOMY      partial       Time Tracking:     PT Received On: 04/12/22  PT Start Time: 1045     PT Stop Time: 1106  PT Total Time (min): 21 min     Billable Minutes: Evaluation 8 and Gait Training 10

## 2022-04-12 NOTE — PLAN OF CARE
Problem: Physical Therapy  Goal: Physical Therapy Goal  Description: Goals to be met by: 2022    Patient will increase functional independence with mobility by performin. Gait  x 25 feet with Supervision using Rolling Walker.   2. Stand for 3 minutes with Supervision using Rolling Walker  3. Lower extremity exercise program x15 reps per handout, with independence    Outcome: Ongoing, Progressing     Eval completed. Goals appropriate.

## 2022-04-12 NOTE — PROGRESS NOTES
[facility] Stepdown  Interventional Cardiology  Progress Note    Patient Name: Marisol Kerr  MRN: [id number]  Admission Date: 4/8/2022  Hospital Length of Stay: 4 days  Code Status: Full Code   Attending Physician: Karl Ontiveros MD   Primary Care Physician: Khadar Dwyer MD  Principal Problem:NSTEMI (non-ST elevated myocardial infarction)    Subjective:     Interval History: No acute issues overnight. Patient seen and examined at bedside this morning. Denies angina. She is on chronic supplemental O2 therapy due to COPD.     Objective:     Vital Signs (Most Recent):  Temp: 97.8 °F (36.6 °C) (04/12/22 1154)  Pulse: 75 (04/12/22 1154)  Resp: 18 (04/12/22 1154)  BP: 131/61 (04/12/22 1154)  SpO2: (!) 94 % (04/12/22 1154)   Vital Signs (24h Range):  Temp:  [97.6 °F (36.4 °C)-98.3 °F (36.8 °C)] 97.8 °F (36.6 °C)  Pulse:  [60-90] 75  Resp:  [18] 18  SpO2:  [94 %-97 %] 94 %  BP: (124-169)/(56-65) 131/61     Weight: 78.7 kg (173 lb 6.3 oz)  Body mass index is 30.71 kg/m².    SpO2: (!) 94 %  O2 Device (Oxygen Therapy): nasal cannula      Intake/Output Summary (Last 24 hours) at 4/12/2022 1334  Last data filed at 4/12/2022 1000  Gross per 24 hour   Intake 612 ml   Output 2250 ml   Net -1638 ml       Lines/Drains/Airways       Drain  Duration             Female External Urinary Catheter 03/27/22 2000 15 days              Peripheral Intravenous Line  Duration                  Peripheral IV - Single Lumen 04/11/22 0800 20 G Lateral;Right Breast 1 day                    Physical Exam  Constitutional:       General: She is not in acute distress.     Appearance: She is well-developed.   HENT:      Head: Normocephalic and atraumatic.   Eyes:      Pupils: Pupils are equal, round, and reactive to light.   Neck:      Vascular: No JVD.   Cardiovascular:      Rate and Rhythm: Normal rate and regular rhythm.      Pulses:           Radial pulses are 2+ on the right side and 2+ on the left side.        Femoral pulses are 2+  on the right side and 2+ on the left side.     Heart sounds: No murmur heard.    No friction rub. No gallop.   Pulmonary:      Effort: Pulmonary effort is normal. No respiratory distress.      Breath sounds: Normal breath sounds. No wheezing or rales.   Chest:      Chest wall: No tenderness.   Abdominal:      General: Bowel sounds are normal. There is no distension.      Palpations: Abdomen is soft.      Tenderness: There is no abdominal tenderness.   Musculoskeletal:         General: Normal range of motion.      Cervical back: Normal range of motion and neck supple.   Lymphadenopathy:      Cervical: No cervical adenopathy.   Skin:     General: Skin is warm and dry.      Findings: No erythema.   Neurological:      Mental Status: She is alert and oriented to person, place, and time.   Psychiatric:         Behavior: Behavior normal.         Thought Content: Thought content normal.         Judgment: Judgment normal.       Significant Labs: All pertinent lab results from the last 24 hours have been reviewed.    Significant Imaging:  Reviewed in Epic.    Assessment and Plan:     Patient is a 76 y.o. female presenting with:    * NSTEMI (non-ST elevated myocardial infarction)  Patient is a 76 year old female with CAD (high grade LCx lesions, distal RCA  based on outside angiogram 02/10/2022) who presented to [facility] with episode of chest discomfort on 04/07/2022. She was transferred to [facility] on 04/08/2022. She has been chest pain free since transition to [facility]. ECG did not show significant ST-T changes. Trop peaked at 0.072 at [place]. Loaded with aspirin and Brilinta. IC consulted for LHC+PCI.     Recs:  - Due to recent ISSA on CDK3 recommend delaying LHC w/ PCI pending renal function  - Will bring patient back for procedure on Monday 4/18/22 as an outpatient (d/w patient at bedside today in detail regarding rationale to delay her LHC procedure)  - Continue aspirin + ticagrelor on discharge  - Ok to  discharge patient home from IC standpoint  - Patient prescribed iodine dye allergy prep to start Sunday evening on 4/17/22   - Consents for LHC + PCI obtained and scanned in patient chart   The risks (including death, heart attack, limb loss, paralysis, emergency surgery, bleeding, renal failure, and stroke), the potential benefits of the procedure, and the alternatives to the procedure, were discussed in detail and accepted by the patient. All questions were answered. Patient agrees to proceed.       Chronic diastolic congestive heart failure  HFpEF LVEF 60%      Interpretation Summary  · Limited study for wall morgan. several off axis views.  · The estimated ejection fraction is 60%.  · The left ventricle is normal in size with normal systolic function.  · The following segments are hypokinetic: basal inferoseptal and basal inferior. All other segments are normal.  · Normal right ventricular size with normal right ventricular systolic function.  Last BNP reviewed- and noted below       Recent Labs   Lab 04/09/22  0501   *      Management as per primary     Chronic hypoxemic respiratory failure  On 3 LPM at home     Acute kidney injury on CKD (chronic kidney disease), stage III  Cr improving with oral hydration, down to 1.4 from 1.7      Essential hypertension, benign  As per primary    Normocytic anemia  Hgb stable at ~10 g/dl labs      Yesenia Frazier MD  Interventional Cardiology  [facility]

## 2022-04-12 NOTE — NURSING
Pt alert and oriented x4. PERRLA intact. Vital signs stable on 2L NC. Pt reports chest and throat pain 10/10 due to constantly attempting to cough up phlegm. Dr. Cueva aware. Mucinex ordered. Pt had an episode of emesis while attempting to clear phlegm out of her throat. Pain decreased after a while. Pt able to transfer to bedside commode without difficulty. Educated pt on meds being administered and s/s to report. Pt understood education. No questions or other concerns at this time. Spoke with Dr. Estrellita Salinas and she states that the pt will not be discharging today. She is staying for further observation of swallow. Left pt sitting up in bed with call light and personal belongings within reach.

## 2022-04-12 NOTE — PLAN OF CARE
04/12/22 1256   Post-Acute Status   Post-Acute Authorization Home Health   Home Health Status Pending medical clearance/testing     Pt accepted to resume care with [facility] Ariel WRIGHT notified by treatment team that discharge is cancelled as pt is now pending a GI consult.  ADRIANA relayed this to Peter with [facility].  Will continue to follow.    Ronda Esquivel, MARIE  [facility]  [id number]
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
## 2022-04-13 PROBLEM — J18.9 HAP (HOSPITAL-ACQUIRED PNEUMONIA): Status: ACTIVE | Noted: 2022-04-13

## 2022-04-13 LAB
ALBUMIN SERPL BCP-MCNC: 2.6 G/DL (ref 3.5–5.2)
ALP SERPL-CCNC: 81 U/L (ref 55–135)
ALT SERPL W/O P-5'-P-CCNC: 9 U/L (ref 10–44)
ANION GAP SERPL CALC-SCNC: 11 MMOL/L (ref 8–16)
AST SERPL-CCNC: 12 U/L (ref 10–40)
BASOPHILS # BLD AUTO: 0.03 K/UL (ref 0–0.2)
BASOPHILS NFR BLD: 0.3 % (ref 0–1.9)
BILIRUB SERPL-MCNC: 0.5 MG/DL (ref 0.1–1)
BUN SERPL-MCNC: 61 MG/DL (ref 8–23)
CALCIUM SERPL-MCNC: 9.1 MG/DL (ref 8.7–10.5)
CHLORIDE SERPL-SCNC: 104 MMOL/L (ref 95–110)
CO2 SERPL-SCNC: 26 MMOL/L (ref 23–29)
CREAT SERPL-MCNC: 2 MG/DL (ref 0.5–1.4)
CREAT UR-MCNC: 43 MG/DL (ref 15–325)
DIFFERENTIAL METHOD: ABNORMAL
EOSINOPHIL # BLD AUTO: 0.2 K/UL (ref 0–0.5)
EOSINOPHIL NFR BLD: 2.2 % (ref 0–8)
ERYTHROCYTE [DISTWIDTH] IN BLOOD BY AUTOMATED COUNT: 12.9 % (ref 11.5–14.5)
EST. GFR  (AFRICAN AMERICAN): 27.7 ML/MIN/1.73 M^2
EST. GFR  (NON AFRICAN AMERICAN): 24.1 ML/MIN/1.73 M^2
GLUCOSE SERPL-MCNC: 78 MG/DL (ref 70–110)
HCT VFR BLD AUTO: 34.1 % (ref 37–48.5)
HGB BLD-MCNC: 10.3 G/DL (ref 12–16)
IMM GRANULOCYTES # BLD AUTO: 0.07 K/UL (ref 0–0.04)
IMM GRANULOCYTES NFR BLD AUTO: 0.7 % (ref 0–0.5)
LYMPHOCYTES # BLD AUTO: 1.1 K/UL (ref 1–4.8)
LYMPHOCYTES NFR BLD: 10.9 % (ref 18–48)
MAGNESIUM SERPL-MCNC: 2.3 MG/DL (ref 1.6–2.6)
MCH RBC QN AUTO: 27.9 PG (ref 27–31)
MCHC RBC AUTO-ENTMCNC: 30.2 G/DL (ref 32–36)
MCV RBC AUTO: 92 FL (ref 82–98)
MONOCYTES # BLD AUTO: 0.9 K/UL (ref 0.3–1)
MONOCYTES NFR BLD: 9.4 % (ref 4–15)
NEUTROPHILS # BLD AUTO: 7.4 K/UL (ref 1.8–7.7)
NEUTROPHILS NFR BLD: 76.5 % (ref 38–73)
NRBC BLD-RTO: 0 /100 WBC
PLATELET # BLD AUTO: 190 K/UL (ref 150–450)
PMV BLD AUTO: 11.7 FL (ref 9.2–12.9)
POCT GLUCOSE: 105 MG/DL (ref 70–110)
POCT GLUCOSE: 110 MG/DL (ref 70–110)
POCT GLUCOSE: 156 MG/DL (ref 70–110)
POCT GLUCOSE: 77 MG/DL (ref 70–110)
POCT GLUCOSE: 83 MG/DL (ref 70–110)
POCT GLUCOSE: 96 MG/DL (ref 70–110)
POTASSIUM SERPL-SCNC: 4.2 MMOL/L (ref 3.5–5.1)
PROT SERPL-MCNC: 5.8 G/DL (ref 6–8.4)
RBC # BLD AUTO: 3.69 M/UL (ref 4–5.4)
SODIUM SERPL-SCNC: 141 MMOL/L (ref 136–145)
SODIUM UR-SCNC: 48 MMOL/L (ref 20–250)
UUN UR-MCNC: 294 MG/DL (ref 140–1050)
WBC # BLD AUTO: 9.65 K/UL (ref 3.9–12.7)

## 2022-04-13 PROCEDURE — 94640 AIRWAY INHALATION TREATMENT: CPT

## 2022-04-13 PROCEDURE — 25000003 PHARM REV CODE 250: Performed by: INTERNAL MEDICINE

## 2022-04-13 PROCEDURE — 27000221 HC OXYGEN, UP TO 24 HOURS

## 2022-04-13 PROCEDURE — 99900035 HC TECH TIME PER 15 MIN (STAT)

## 2022-04-13 PROCEDURE — 36415 COLL VENOUS BLD VENIPUNCTURE: CPT | Performed by: INTERNAL MEDICINE

## 2022-04-13 PROCEDURE — 20600001 HC STEP DOWN PRIVATE ROOM

## 2022-04-13 PROCEDURE — 92526 ORAL FUNCTION THERAPY: CPT

## 2022-04-13 PROCEDURE — 63600175 PHARM REV CODE 636 W HCPCS

## 2022-04-13 PROCEDURE — 84540 ASSAY OF URINE/UREA-N: CPT

## 2022-04-13 PROCEDURE — 99233 SBSQ HOSP IP/OBS HIGH 50: CPT | Mod: GC,,, | Performed by: INTERNAL MEDICINE

## 2022-04-13 PROCEDURE — 94761 N-INVAS EAR/PLS OXIMETRY MLT: CPT

## 2022-04-13 PROCEDURE — 83735 ASSAY OF MAGNESIUM: CPT | Performed by: INTERNAL MEDICINE

## 2022-04-13 PROCEDURE — 99233 PR SUBSEQUENT HOSPITAL CARE,LEVL III: ICD-10-PCS | Mod: GC,,, | Performed by: INTERNAL MEDICINE

## 2022-04-13 PROCEDURE — 84300 ASSAY OF URINE SODIUM: CPT

## 2022-04-13 PROCEDURE — 97535 SELF CARE MNGMENT TRAINING: CPT

## 2022-04-13 PROCEDURE — 82570 ASSAY OF URINE CREATININE: CPT

## 2022-04-13 PROCEDURE — 94799 UNLISTED PULMONARY SVC/PX: CPT

## 2022-04-13 PROCEDURE — 25000242 PHARM REV CODE 250 ALT 637 W/ HCPCS

## 2022-04-13 PROCEDURE — 85025 COMPLETE CBC W/AUTO DIFF WBC: CPT

## 2022-04-13 PROCEDURE — 25000003 PHARM REV CODE 250: Performed by: STUDENT IN AN ORGANIZED HEALTH CARE EDUCATION/TRAINING PROGRAM

## 2022-04-13 PROCEDURE — 25000003 PHARM REV CODE 250

## 2022-04-13 PROCEDURE — 80053 COMPREHEN METABOLIC PANEL: CPT

## 2022-04-13 RX ORDER — SODIUM CHLORIDE 9 MG/ML
INJECTION, SOLUTION INTRAVENOUS CONTINUOUS
Status: DISCONTINUED | OUTPATIENT
Start: 2022-04-13 | End: 2022-04-14

## 2022-04-13 RX ORDER — SODIUM CHLORIDE 9 MG/ML
INJECTION, SOLUTION INTRAVENOUS CONTINUOUS
Status: ACTIVE | OUTPATIENT
Start: 2022-04-13 | End: 2022-04-13

## 2022-04-13 RX ORDER — MUPIROCIN 20 MG/G
OINTMENT TOPICAL 2 TIMES DAILY
Status: DISPENSED | OUTPATIENT
Start: 2022-04-13 | End: 2022-04-18

## 2022-04-13 RX ADMIN — IPRATROPIUM BROMIDE AND ALBUTEROL SULFATE 3 ML: 2.5; .5 SOLUTION RESPIRATORY (INHALATION) at 12:04

## 2022-04-13 RX ADMIN — LISINOPRIL 10 MG: 10 TABLET ORAL at 08:04

## 2022-04-13 RX ADMIN — Medication 1 CAPSULE: at 08:04

## 2022-04-13 RX ADMIN — GABAPENTIN 100 MG: 100 CAPSULE ORAL at 08:04

## 2022-04-13 RX ADMIN — TRIAMCINOLONE ACETONIDE: 1 CREAM TOPICAL at 09:04

## 2022-04-13 RX ADMIN — TICAGRELOR 90 MG: 90 TABLET ORAL at 08:04

## 2022-04-13 RX ADMIN — GUAIFENESIN 600 MG: 600 TABLET, EXTENDED RELEASE ORAL at 08:04

## 2022-04-13 RX ADMIN — IPRATROPIUM BROMIDE AND ALBUTEROL SULFATE 3 ML: 2.5; .5 SOLUTION RESPIRATORY (INHALATION) at 07:04

## 2022-04-13 RX ADMIN — SODIUM CHLORIDE: 0.9 INJECTION, SOLUTION INTRAVENOUS at 12:04

## 2022-04-13 RX ADMIN — FUROSEMIDE 40 MG: 40 TABLET ORAL at 08:04

## 2022-04-13 RX ADMIN — FLUTICASONE FUROATE AND VILANTEROL TRIFENATATE 1 PUFF: 100; 25 POWDER RESPIRATORY (INHALATION) at 07:04

## 2022-04-13 RX ADMIN — BENZONATATE 100 MG: 100 CAPSULE, LIQUID FILLED ORAL at 09:04

## 2022-04-13 RX ADMIN — PIPERACILLIN SODIUM AND TAZOBACTAM SODIUM 4.5 G: 4; .5 INJECTION, POWDER, FOR SOLUTION INTRAVENOUS at 12:04

## 2022-04-13 RX ADMIN — TIOTROPIUM BROMIDE INHALATION SPRAY 2 PUFF: 3.12 SPRAY, METERED RESPIRATORY (INHALATION) at 07:04

## 2022-04-13 RX ADMIN — ISOSORBIDE MONONITRATE 30 MG: 30 TABLET, EXTENDED RELEASE ORAL at 08:04

## 2022-04-13 RX ADMIN — AMLODIPINE BESYLATE 10 MG: 10 TABLET ORAL at 08:04

## 2022-04-13 RX ADMIN — PIPERACILLIN SODIUM AND TAZOBACTAM SODIUM 4.5 G: 4; .5 INJECTION, POWDER, FOR SOLUTION INTRAVENOUS at 08:04

## 2022-04-13 RX ADMIN — FERROUS SULFATE TAB 325 MG (65 MG ELEMENTAL FE) 1 EACH: 325 (65 FE) TAB at 08:04

## 2022-04-13 RX ADMIN — SODIUM CHLORIDE: 0.9 INJECTION, SOLUTION INTRAVENOUS at 09:04

## 2022-04-13 RX ADMIN — PANTOPRAZOLE SODIUM 40 MG: 40 TABLET, DELAYED RELEASE ORAL at 08:04

## 2022-04-13 RX ADMIN — Medication 100 MG: at 08:04

## 2022-04-13 RX ADMIN — ASPIRIN 81 MG: 81 TABLET, COATED ORAL at 08:04

## 2022-04-13 RX ADMIN — METOPROLOL SUCCINATE 50 MG: 50 TABLET, EXTENDED RELEASE ORAL at 08:04

## 2022-04-13 RX ADMIN — ATORVASTATIN CALCIUM 80 MG: 20 TABLET, FILM COATED ORAL at 08:04

## 2022-04-13 RX ADMIN — MUPIROCIN: 20 OINTMENT TOPICAL at 09:04

## 2022-04-13 NOTE — ASSESSMENT & PLAN NOTE
- patient w/ persistent cough and increased sputum production. Not infectious appearing, afebrile and without leukocytosis. However, CXR w/ increased RLL and RML infiltrates. No improvement of symptoms w/ symptomatic treatment, thus will treat for HAP w/ Zosyn.   - additionally, concerned for microaspiration given patient continues to regurgitate food - SLP consulted, appreciate recommendations. Will consult GI for oropharyngeal dysphagia vs globus sensation. EDG tomorrow.

## 2022-04-13 NOTE — SUBJECTIVE & OBJECTIVE
Interval History: No issues overnight. Patient remains chest pain free. Cr bumped to 2.0 from 1.4 on morning labs.     Objective:     Vital Signs (Most Recent):  Temp: 98.7 °F (37.1 °C) (04/13/22 0743)  Pulse: 87 (04/13/22 0743)  Resp: 18 (04/13/22 0743)  BP: 109/73 (04/13/22 0844)  SpO2: (!) 92 % (04/13/22 0743)   Vital Signs (24h Range):  Temp:  [97.1 °F (36.2 °C)-98.7 °F (37.1 °C)] 98.7 °F (37.1 °C)  Pulse:  [62-87] 87  Resp:  [18-22] 18  SpO2:  [92 %-99 %] 92 %  BP: ()/(44-73) 109/73     Weight: 78.7 kg (173 lb 6.3 oz)  Body mass index is 30.71 kg/m².    SpO2: (!) 92 %  O2 Device (Oxygen Therapy): nasal cannula      Intake/Output Summary (Last 24 hours) at 4/13/2022 1021  Last data filed at 4/13/2022 0900  Gross per 24 hour   Intake 358 ml   Output 325 ml   Net 33 ml       Lines/Drains/Airways       Drain  Duration             Female External Urinary Catheter 03/27/22 2000 16 days              Peripheral Intravenous Line  Duration                  Peripheral IV - Single Lumen 04/11/22 0800 20 G Lateral;Right Breast 2 days                    Physical Exam  Constitutional:       General: She is not in acute distress.     Appearance: She is well-developed.   HENT:      Head: Normocephalic and atraumatic.   Eyes:      Pupils: Pupils are equal, round, and reactive to light.   Neck:      Vascular: No JVD.   Cardiovascular:      Rate and Rhythm: Normal rate and regular rhythm.      Pulses:           Radial pulses are 2+ on the right side and 2+ on the left side.        Femoral pulses are 2+ on the right side and 2+ on the left side.     Heart sounds: No murmur heard.    No friction rub. No gallop.   Pulmonary:      Effort: Pulmonary effort is normal. No respiratory distress.      Breath sounds: Normal breath sounds. No wheezing or rales.   Chest:      Chest wall: No tenderness.   Abdominal:      General: Bowel sounds are normal. There is no distension.      Palpations: Abdomen is soft.      Tenderness: There is  no abdominal tenderness.   Musculoskeletal:         General: Normal range of motion.      Cervical back: Normal range of motion and neck supple.   Lymphadenopathy:      Cervical: No cervical adenopathy.   Skin:     General: Skin is warm and dry.      Findings: No erythema.   Neurological:      Mental Status: She is alert and oriented to person, place, and time.   Psychiatric:         Behavior: Behavior normal.         Thought Content: Thought content normal.         Judgment: Judgment normal.     Significant Labs: All pertinent lab results from the last 24 hours have been reviewed.    Significant Imaging:  Reviewed in Epic.

## 2022-04-13 NOTE — PROGRESS NOTES
[facility] Step[facility]  Cardiology  Progress Note    Patient Name: Marisol Kerr  MRN: [id number]  Admission Date: 4/8/2022  Hospital Length of Stay: 5 days  Code Status: Full Code   Attending Physician: Karl Ontiveros MD   Primary Care Physician: Khadar Dwyer MD  Expected Discharge Date: 4/18/2022  Principal Problem:NSTEMI (non-ST elevated myocardial infarction)    Subjective:     Hospital Course:   Patient was asymptomatic on DAPT and Heparin, however, heparin was discontinued on arrival in the setting of known history of rectus sheath hematoma which occurred as a post-C complication. Patient originally scheduled to undergo high risk PCI whilst inpatient w/ Dr. Chappell but as per interventional cardiology, this will now take place 4/17/2022 in the outpatient setting. Of note, patient has developed worsening cough w/ increased clear sputum production and pleuritic chest pain. She remains afebrile and overall non infectious appearing. Repeat CXR w/ increased RML and RLL infiltrates. Patient's cough has not improved w/ symptomatic treatment. Given her high risk of decompensation, we will initiate HAP treatment w/ Zosyn. Additionally, concern for microaspiration 2/2 ?oropharyngeal dysphasia vs globus sensation - evaluated by SLP and GI evaluation recommended. As per GI, patient will undergo an EGD 4/14. Cr 1.4 --> 2.0; likely pre-renal in the setting of decreased intake. Will administer IVFs PRN and continue to monitor. Anti-hypertensives held in the setting of borderline hypotension.       Interval History: Patient w/ worsening cough overnight. Will start HAP treatment w/ Zosyn and continue to monitor. Plan for EDG tomorrow as per GI. Worsening Cr  - likely prerenal ISSA in setting of decreased PO intake. Will give IVFs and hold antihypertensives and nephrotoxic medications.     Review of Systems   Constitutional: Negative for chills, decreased appetite, diaphoresis, fever, malaise/fatigue and weight  gain.   HENT: Negative.  Negative for congestion and sore throat.    Eyes:  Negative for blurred vision and double vision.   Cardiovascular:  Negative for chest pain, dyspnea on exertion, irregular heartbeat, leg swelling and palpitations.   Respiratory:  Positive for cough. Negative for hemoptysis, shortness of breath and wheezing.    Hematologic/Lymphatic: Positive for bleeding problem. Bruises/bleeds easily.   Skin: Negative.  Negative for itching and rash.   Musculoskeletal:  Positive for arthritis. Negative for falls and joint pain.   Gastrointestinal:  Positive for nausea and vomiting. Negative for bloating, abdominal pain, constipation, diarrhea and melena.   Genitourinary: Negative.  Negative for hematuria and urgency.   Neurological:  Negative for dizziness, headaches, light-headedness and numbness.   Psychiatric/Behavioral: Negative.  Negative for altered mental status and depression.    Objective:     Vital Signs (Most Recent):  Temp: 98.4 °F (36.9 °C) (04/13/22 1151)  Pulse: 69 (04/13/22 1200)  Resp: 17 (04/13/22 1200)  BP: (!) 117/59 (04/13/22 1151)  SpO2: 97 % (04/13/22 1200)   Vital Signs (24h Range):  Temp:  [97.1 °F (36.2 °C)-98.7 °F (37.1 °C)] 98.4 °F (36.9 °C)  Pulse:  [62-87] 69  Resp:  [17-22] 17  SpO2:  [92 %-99 %] 97 %  BP: ()/(44-73) 117/59     Weight: 78.7 kg (173 lb 6.3 oz)  Body mass index is 30.71 kg/m².     SpO2: 97 %  O2 Device (Oxygen Therapy): nasal cannula      Intake/Output Summary (Last 24 hours) at 4/13/2022 1209  Last data filed at 4/13/2022 0900  Gross per 24 hour   Intake 118 ml   Output --   Net 118 ml       Lines/Drains/Airways       Drain  Duration             Female External Urinary Catheter 03/27/22 2000 16 days              Peripheral Intravenous Line  Duration                  Peripheral IV - Single Lumen 04/11/22 0800 20 G Lateral;Right Breast 2 days                    Physical Exam  Vitals and nursing note reviewed.   Constitutional:       Appearance: Normal  appearance.   HENT:      Head: Normocephalic and atraumatic.      Nose: Nose normal.      Mouth/Throat:      Mouth: Mucous membranes are moist.      Pharynx: Oropharynx is clear.      Comments: Oozing blister on bottom lip   Eyes:      Extraocular Movements: Extraocular movements intact.   Cardiovascular:      Rate and Rhythm: Normal rate and regular rhythm.      Pulses: Normal pulses.      Heart sounds: Normal heart sounds.   Pulmonary:      Effort: Pulmonary effort is normal.      Breath sounds: Normal breath sounds. No wheezing or rales.   Abdominal:      General: Bowel sounds are normal.      Palpations: Abdomen is soft.      Tenderness: There is no abdominal tenderness.      Hernia: A hernia (R hernia abdominal hernia, reducable, non-painful) is present.   Musculoskeletal:         General: Normal range of motion.      Cervical back: Normal range of motion and neck supple.      Right lower leg: No edema.      Left lower leg: No edema.   Skin:     General: Skin is warm and dry.   Neurological:      General: No focal deficit present.      Mental Status: She is alert and oriented to person, place, and time.   Psychiatric:         Mood and Affect: Mood normal.         Behavior: Behavior normal.       Significant Labs: CMP   Recent Labs   Lab 04/12/22  0550 04/13/22  0628    141   K 4.3 4.2    104   CO2 26 26    78   BUN 52* 61*   CREATININE 1.4 2.0*   CALCIUM 9.3 9.1   PROT 5.9* 5.8*   ALBUMIN 2.7* 2.6*   BILITOT 0.4 0.5   ALKPHOS 81 81   AST 13 12   ALT 9* 9*   ANIONGAP 8 11   ESTGFRAFRICA 42.7* 27.7*   EGFRNONAA 37.0* 24.1*    and CBC   Recent Labs   Lab 04/12/22  0550 04/13/22  0628   WBC 12.49 9.65   HGB 10.1* 10.3*   HCT 32.5* 34.1*    190       Significant Imaging: Reviewed     Assessment and Plan:     * NSTEMI (non-ST elevated myocardial infarction)  Acute onset chest pressure, EKG with lateral ST depressions, and troponin 0.06 (mildly elevated) all consistent with NSTEMI. Currently  stable and asymptomatic. 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 Outpt plan to follow-up with Dr Chappell for high risk PCI. Troponin peaked at 0.07.     - Start ACS reloaded with ASA/Brilinta, will not continue heparin ggt as pt has hx of rectus sheath hematoma during last L heart cath.   - Continue atorvastatin, metoprolol  - f/u Echo  - SL Nitro PRN for chest pain  - patient will have outpatient high risk PCI tentatively scheduled for 4/17 w/ Dr. Chappell     HAP (hospital-acquired pneumonia)  - patient w/ persistent cough and increased sputum production. Not infectious appearing, afebrile and without leukocytosis. However, CXR w/ increased RLL and RML infiltrates. No improvement of symptoms w/ symptomatic treatment, thus will treat for HAP w/ Zosyn.   - additionally, concerned for microaspiration given patient continues to regurgitate food - SLP consulted, appreciate recommendations. Will consult GI for oropharyngeal dysphagia vs globus sensation. EDG tomorrow.      Normocytic anemia  Hb 9.8 on arrival and at baseline. Prior rectus sheath hematoma after angiogram 2/2022 requiring inferior epigastric artery embolization. No s/s bleeding and on DAPT.     - Continue home ferrous sulfate  - Type and screen for potential procedure  - Trend CBC daily  - Transfuse to maintain Hb >8 with CAD    Chronic diastolic congestive heart failure  Normal EF 2/2022 and no s/s of exacerbation. TTE on 04/09 with EF 60, hypokinetic basal inferoseptal and inferior hypokinesis.    - Continue home furosemide 40mg qd (hold in the setting of hypotension and ISSA)   - Daily weights, strict I/Os  - Not on MRA or SLGT2; can consider as outpt      Chronic hypoxemic respiratory failure  On NC at home and underlying COPD. No s/s exacerbation.    - Continue home inhaler regimen  - Albuterol nebs PRN  - Supplemental oxygen      CKD (chronic kidney disease), stage III  Cr 1.3 on arrival with  baseline 1.3-1.6.  - Cr 1.4 --> 2.0 4/13   - Likely Pre-renal in the setting of decreased PO intake. Will hold lisinopril at this time.   - Trend BMP, Mag, Phos  - Strict I/Os  - Avoid nephrotoxins    Gastroesophageal reflux disease without esophagitis  - Continue home PPI    Essential hypertension, benign  - Will hold antihypertensives in the setting of hypotension         VTE Risk Mitigation (From admission, onward)         Ordered     IP VTE HIGH RISK PATIENT  Once         04/08/22 2203     Place sequential compression device  Until discontinued         04/08/22 2203                Estrellita Salinas MD  Cardiology  [facility] Card[facility] Stepdown

## 2022-04-13 NOTE — ASSESSMENT & PLAN NOTE
Normal EF 2/2022 and no s/s of exacerbation. TTE on 04/09 with EF 60, hypokinetic basal inferoseptal and inferior hypokinesis.    - Continue home furosemide 40mg qd (hold in the setting of hypotension and ISSA)   - Daily weights, strict I/Os  - Not on MRA or SLGT2; can consider as outpt

## 2022-04-13 NOTE — PT/OT/SLP PROGRESS
"Speech Language Pathology Treatment    Patient Name:  Marisol Kerr   MRN:  [id number]  Admitting Diagnosis: NSTEMI (non-ST elevated myocardial infarction)    Recommendations:                 General Recommendations:  GI evaluation and monitor diet tolerance  Diet recommendations:  Regular, Liquid Diet Level: Thin   Aspiration Precautions: 1 bite/sip at a time, Eliminate distractions, HOB to 90 degrees, Remain upright 30 minutes post meal, Small bites/sips and Strict aspiration precautions   General Precautions: Standard, fall, aspiration  Communication strategies:  none    Subjective     SLP communicated with RN prior to entry and received clearance for therapy this date.   Pt awake and alert upon ST entry. Pt agreeable to participate with ST.    Pain/Comfort:  · Pain Rating 1:  (did not rate)  · Location - Orientation 1: generalized  · Location 1:  (soreness/body aches)  · Pain Addressed 1: Distraction, Nurse notified  · Pain Rating Post-Intervention 1:  (did not rate)  · Pain Rating 2:  (did not rate)  · Location - Orientation 2: medial  · Location 2: sternal (pt verbally indicated "throat" though localized by pointing to level just below the sternal notch)  · Pain Addressed 2:  (did not rate)  · Pain Rating Post-Intervention 2:  (did not rate)    Respiratory Status: Nasal cannula    Objective:     Has the patient been evaluated by SLP for swallowing?   Yes  Keep patient NPO? No     Pt seen bedside for ongoing SLP services. Per RN, pt continues to present with persistent nausea and demonstrates limited po intake. Pt endorsed limited po intake 2/2 nausea and "pain." Pt verbally reported pain in her throat, though physically localized pain by pointing to level just below the sternal notch. SLP offered education within scope regarding role of SLP vs. role of GI to which pt verbalized understanding. Pt accepted x3 straw sips water, ~4 oz straw sips orange juice, and several whole pills at once. Pt demonstrated consistent " "belching following the swallow with intermittent coughing following belch. She indicated that she feels the pills "won't go down" (pointing to sternum) unless she independently implements double swallow. Pt politely declined trials of solid consistencies at this time. SLP with continued diet recs and ongoing rec for consideration of GI evaluation. Pt verbalized understanding and agreement. RN present in room upon SLP exit.     Assessment:     Marisol Kerr is a 76 y.o. female with continued suspected GI component impacting swallow. Patient may benefit from a GI consult 2/2 reporting pain/globus sensation below the level of the sternal notch and noted belching. ST will follow up to assess further consistencies as able.     Goals:   Multidisciplinary Problems     SLP Goals        Problem: SLP    Goal Priority Disciplines Outcome   SLP Goal     SLP Ongoing, Progressing   Description: Speech Language Pathology Goals  Goals expected to be met by 4/19:  1. Pt will participate in ongoing assessment of swallow function given advanced textures to determine safest and least restrictive diet.              Multidisciplinary Problems (Resolved)        Problem: SLP    Goal Priority Disciplines Outcome   SLP Goal   (Resolved)     SLP Met                   Plan:     · Patient to be seen:  3 x/week   · Plan of Care reviewed with:  patient   · SLP Follow-Up:  Yes       Discharge recommendations:   (likely no post-acute SLP services warranted, will continue to monitor)       Time Tracking:     SLP Treatment Date:   04/13/22  Speech Start Time:  0833  Speech Stop Time:  0846     Speech Total Time (min):  13 min    Billable Minutes: Treatment Swallowing Dysfunction 5 and Self Care/Home Management Training 8  04/13/2022  "

## 2022-04-13 NOTE — SUBJECTIVE & OBJECTIVE
Interval History: Patient w/ worsening cough overnight. Will start HAP treatment w/ Zosyn and continue to monitor. Plan for EDG tomorrow as per GI. Worsening Cr  - likely prerenal ISSA in setting of decreased PO intake. Will give IVFs and hold antihypertensives and nephrotoxic medications.     Review of Systems   Constitutional: Negative for chills, decreased appetite, diaphoresis, fever, malaise/fatigue and weight gain.   HENT: Negative.  Negative for congestion and sore throat.    Eyes:  Negative for blurred vision and double vision.   Cardiovascular:  Negative for chest pain, dyspnea on exertion, irregular heartbeat, leg swelling and palpitations.   Respiratory:  Positive for cough. Negative for hemoptysis, shortness of breath and wheezing.    Hematologic/Lymphatic: Positive for bleeding problem. Bruises/bleeds easily.   Skin: Negative.  Negative for itching and rash.   Musculoskeletal:  Positive for arthritis. Negative for falls and joint pain.   Gastrointestinal:  Positive for nausea and vomiting. Negative for bloating, abdominal pain, constipation, diarrhea and melena.   Genitourinary: Negative.  Negative for hematuria and urgency.   Neurological:  Negative for dizziness, headaches, light-headedness and numbness.   Psychiatric/Behavioral: Negative.  Negative for altered mental status and depression.    Objective:     Vital Signs (Most Recent):  Temp: 98.4 °F (36.9 °C) (04/13/22 1151)  Pulse: 69 (04/13/22 1200)  Resp: 17 (04/13/22 1200)  BP: (!) 117/59 (04/13/22 1151)  SpO2: 97 % (04/13/22 1200)   Vital Signs (24h Range):  Temp:  [97.1 °F (36.2 °C)-98.7 °F (37.1 °C)] 98.4 °F (36.9 °C)  Pulse:  [62-87] 69  Resp:  [17-22] 17  SpO2:  [92 %-99 %] 97 %  BP: ()/(44-73) 117/59     Weight: 78.7 kg (173 lb 6.3 oz)  Body mass index is 30.71 kg/m².     SpO2: 97 %  O2 Device (Oxygen Therapy): nasal cannula      Intake/Output Summary (Last 24 hours) at 4/13/2022 1209  Last data filed at 4/13/2022 0900  Gross per 24  hour   Intake 118 ml   Output --   Net 118 ml       Lines/Drains/Airways       Drain  Duration             Female External Urinary Catheter 03/27/22 2000 16 days              Peripheral Intravenous Line  Duration                  Peripheral IV - Single Lumen 04/11/22 0800 20 G Lateral;Right Breast 2 days                    Physical Exam  Vitals and nursing note reviewed.   Constitutional:       Appearance: Normal appearance.   HENT:      Head: Normocephalic and atraumatic.      Nose: Nose normal.      Mouth/Throat:      Mouth: Mucous membranes are moist.      Pharynx: Oropharynx is clear.      Comments: Oozing blister on bottom lip   Eyes:      Extraocular Movements: Extraocular movements intact.   Cardiovascular:      Rate and Rhythm: Normal rate and regular rhythm.      Pulses: Normal pulses.      Heart sounds: Normal heart sounds.   Pulmonary:      Effort: Pulmonary effort is normal.      Breath sounds: Normal breath sounds. No wheezing or rales.   Abdominal:      General: Bowel sounds are normal.      Palpations: Abdomen is soft.      Tenderness: There is no abdominal tenderness.      Hernia: A hernia (R hernia abdominal hernia, reducable, non-painful) is present.   Musculoskeletal:         General: Normal range of motion.      Cervical back: Normal range of motion and neck supple.      Right lower leg: No edema.      Left lower leg: No edema.   Skin:     General: Skin is warm and dry.   Neurological:      General: No focal deficit present.      Mental Status: She is alert and oriented to person, place, and time.   Psychiatric:         Mood and Affect: Mood normal.         Behavior: Behavior normal.       Significant Labs: CMP   Recent Labs   Lab 04/12/22  0550 04/13/22  0628    141   K 4.3 4.2    104   CO2 26 26    78   BUN 52* 61*   CREATININE 1.4 2.0*   CALCIUM 9.3 9.1   PROT 5.9* 5.8*   ALBUMIN 2.7* 2.6*   BILITOT 0.4 0.5   ALKPHOS 81 81   AST 13 12   ALT 9* 9*   ANIONGAP 8 11    ESTGFRAFRICA 42.7* 27.7*   EGFRNONAA 37.0* 24.1*    and CBC   Recent Labs   Lab 04/12/22  0550 04/13/22  0628   WBC 12.49 9.65   HGB 10.1* 10.3*   HCT 32.5* 34.1*    190       Significant Imaging: Reviewed

## 2022-04-13 NOTE — ASSESSMENT & PLAN NOTE
Acute onset chest pressure, EKG with lateral ST depressions, and troponin 0.06 (mildly elevated) all consistent with NSTEMI. Currently stable and asymptomatic. 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 Outpt plan to follow-up with Dr Chappell for high risk PCI. Troponin peaked at 0.07.     - Start ACS reloaded with ASA/Brilinta, will not continue heparin ggt as pt has hx of rectus sheath hematoma during last L heart cath.   - Continue atorvastatin, metoprolol  - f/u Echo  - SL Nitro PRN for chest pain  - patient will have outpatient high risk PCI tentatively scheduled for 4/17 w/ Dr. Chappell

## 2022-04-13 NOTE — PLAN OF CARE
Pt free of falls/trauma/injuries.  Denies c/o SOB, CP, or discomfort.  Generalized skin remains CDI;   Electrolytes replaced as ordered.  Pt tolerating plan of care.

## 2022-04-13 NOTE — PLAN OF CARE
Problem: SLP  Goal: SLP Goal  Description: Speech Language Pathology Goals  Goals expected to be met by 4/19:  1. Pt will participate in ongoing assessment of swallow function given advanced textures to determine safest and least restrictive diet.   Outcome: Ongoing, Progressing  Pt seen bedside for ongoing assessment of swallow function, though pt reported persistent nausea and continued to deny trials of advanced textures this date. SLP with ongoing rec for regular diet with thin liquids. See note for details.   4/13/2022

## 2022-04-13 NOTE — NURSING
Pt alert and oriented x4. PERRLA intact. Vital signs stable on 2L NC. Report of mild swallowing pain when working with speech during evaluation of swallow. Pt continues to have a moderate amount of phlegm present. Educated pt on meds and s/s to report. Pt understood education. No other questions or concerns at this time. Pt able to ambulate to chair with assistance. Left pt sitting up in chair, eating breakfast, with call light and personal belongings within reach.

## 2022-04-13 NOTE — ASSESSMENT & PLAN NOTE
Cr 1.3 on arrival with baseline 1.3-1.6.  - Cr 1.4 --> 2.0 4/13   - Likely Pre-renal in the setting of decreased PO intake. Will hold lisinopril at this time.   - Trend BMP, Mag, Phos  - Strict I/Os  - Avoid nephrotoxins

## 2022-04-14 LAB
ALBUMIN SERPL BCP-MCNC: 2.5 G/DL (ref 3.5–5.2)
ALP SERPL-CCNC: 72 U/L (ref 55–135)
ALT SERPL W/O P-5'-P-CCNC: 11 U/L (ref 10–44)
ANION GAP SERPL CALC-SCNC: 9 MMOL/L (ref 8–16)
AST SERPL-CCNC: 12 U/L (ref 10–40)
BASOPHILS # BLD AUTO: 0.04 K/UL (ref 0–0.2)
BASOPHILS NFR BLD: 0.4 % (ref 0–1.9)
BILIRUB SERPL-MCNC: 0.3 MG/DL (ref 0.1–1)
BUN SERPL-MCNC: 56 MG/DL (ref 8–23)
CALCIUM SERPL-MCNC: 8.2 MG/DL (ref 8.7–10.5)
CHLORIDE SERPL-SCNC: 109 MMOL/L (ref 95–110)
CO2 SERPL-SCNC: 25 MMOL/L (ref 23–29)
CREAT SERPL-MCNC: 1.8 MG/DL (ref 0.5–1.4)
DIFFERENTIAL METHOD: ABNORMAL
EOSINOPHIL # BLD AUTO: 0.2 K/UL (ref 0–0.5)
EOSINOPHIL NFR BLD: 2.3 % (ref 0–8)
ERYTHROCYTE [DISTWIDTH] IN BLOOD BY AUTOMATED COUNT: 13.2 % (ref 11.5–14.5)
EST. GFR  (AFRICAN AMERICAN): 31.5 ML/MIN/1.73 M^2
EST. GFR  (NON AFRICAN AMERICAN): 27.3 ML/MIN/1.73 M^2
GLUCOSE SERPL-MCNC: 96 MG/DL (ref 70–110)
HCT VFR BLD AUTO: 31.9 % (ref 37–48.5)
HGB BLD-MCNC: 9.8 G/DL (ref 12–16)
IMM GRANULOCYTES # BLD AUTO: 0.07 K/UL (ref 0–0.04)
IMM GRANULOCYTES NFR BLD AUTO: 0.7 % (ref 0–0.5)
LYMPHOCYTES # BLD AUTO: 0.9 K/UL (ref 1–4.8)
LYMPHOCYTES NFR BLD: 9 % (ref 18–48)
MAGNESIUM SERPL-MCNC: 2 MG/DL (ref 1.6–2.6)
MCH RBC QN AUTO: 28.2 PG (ref 27–31)
MCHC RBC AUTO-ENTMCNC: 30.7 G/DL (ref 32–36)
MCV RBC AUTO: 92 FL (ref 82–98)
MONOCYTES # BLD AUTO: 0.8 K/UL (ref 0.3–1)
MONOCYTES NFR BLD: 7.9 % (ref 4–15)
NEUTROPHILS # BLD AUTO: 7.7 K/UL (ref 1.8–7.7)
NEUTROPHILS NFR BLD: 79.7 % (ref 38–73)
NRBC BLD-RTO: 0 /100 WBC
PLATELET # BLD AUTO: 190 K/UL (ref 150–450)
PMV BLD AUTO: 11.5 FL (ref 9.2–12.9)
POCT GLUCOSE: 103 MG/DL (ref 70–110)
POCT GLUCOSE: 111 MG/DL (ref 70–110)
POCT GLUCOSE: 127 MG/DL (ref 70–110)
POTASSIUM SERPL-SCNC: 4.3 MMOL/L (ref 3.5–5.1)
PROT SERPL-MCNC: 5.2 G/DL (ref 6–8.4)
RBC # BLD AUTO: 3.48 M/UL (ref 4–5.4)
SODIUM SERPL-SCNC: 143 MMOL/L (ref 136–145)
WBC # BLD AUTO: 9.69 K/UL (ref 3.9–12.7)

## 2022-04-14 PROCEDURE — 36415 COLL VENOUS BLD VENIPUNCTURE: CPT | Performed by: INTERNAL MEDICINE

## 2022-04-14 PROCEDURE — 99900035 HC TECH TIME PER 15 MIN (STAT)

## 2022-04-14 PROCEDURE — A9698 NON-RAD CONTRAST MATERIALNOC: HCPCS | Performed by: INTERNAL MEDICINE

## 2022-04-14 PROCEDURE — 92526 ORAL FUNCTION THERAPY: CPT

## 2022-04-14 PROCEDURE — 25000003 PHARM REV CODE 250: Performed by: INTERNAL MEDICINE

## 2022-04-14 PROCEDURE — 99233 SBSQ HOSP IP/OBS HIGH 50: CPT | Mod: GC,,, | Performed by: INTERNAL MEDICINE

## 2022-04-14 PROCEDURE — 20600001 HC STEP DOWN PRIVATE ROOM

## 2022-04-14 PROCEDURE — 97116 GAIT TRAINING THERAPY: CPT

## 2022-04-14 PROCEDURE — 94799 UNLISTED PULMONARY SVC/PX: CPT

## 2022-04-14 PROCEDURE — 99233 PR SUBSEQUENT HOSPITAL CARE,LEVL III: ICD-10-PCS | Mod: GC,,, | Performed by: INTERNAL MEDICINE

## 2022-04-14 PROCEDURE — 94640 AIRWAY INHALATION TREATMENT: CPT

## 2022-04-14 PROCEDURE — 85025 COMPLETE CBC W/AUTO DIFF WBC: CPT | Performed by: INTERNAL MEDICINE

## 2022-04-14 PROCEDURE — 25500020 PHARM REV CODE 255: Performed by: INTERNAL MEDICINE

## 2022-04-14 PROCEDURE — 94761 N-INVAS EAR/PLS OXIMETRY MLT: CPT

## 2022-04-14 PROCEDURE — 63600175 PHARM REV CODE 636 W HCPCS

## 2022-04-14 PROCEDURE — 25000003 PHARM REV CODE 250: Performed by: STUDENT IN AN ORGANIZED HEALTH CARE EDUCATION/TRAINING PROGRAM

## 2022-04-14 PROCEDURE — 83735 ASSAY OF MAGNESIUM: CPT | Performed by: INTERNAL MEDICINE

## 2022-04-14 PROCEDURE — 97530 THERAPEUTIC ACTIVITIES: CPT

## 2022-04-14 PROCEDURE — 25000242 PHARM REV CODE 250 ALT 637 W/ HCPCS

## 2022-04-14 PROCEDURE — 80053 COMPREHEN METABOLIC PANEL: CPT | Performed by: INTERNAL MEDICINE

## 2022-04-14 PROCEDURE — 25000003 PHARM REV CODE 250

## 2022-04-14 PROCEDURE — 27000221 HC OXYGEN, UP TO 24 HOURS

## 2022-04-14 RX ORDER — FLUCONAZOLE 200 MG/1
800 TABLET ORAL ONCE
Status: COMPLETED | OUTPATIENT
Start: 2022-04-14 | End: 2022-04-14

## 2022-04-14 RX ORDER — FLUCONAZOLE 200 MG/1
400 TABLET ORAL DAILY
Status: DISCONTINUED | OUTPATIENT
Start: 2022-04-15 | End: 2022-04-16

## 2022-04-14 RX ADMIN — IPRATROPIUM BROMIDE AND ALBUTEROL SULFATE 3 ML: 2.5; .5 SOLUTION RESPIRATORY (INHALATION) at 12:04

## 2022-04-14 RX ADMIN — PIPERACILLIN SODIUM AND TAZOBACTAM SODIUM 4.5 G: 4; .5 INJECTION, POWDER, FOR SOLUTION INTRAVENOUS at 01:04

## 2022-04-14 RX ADMIN — BARIUM SULFATE 210 ML: 0.6 SUSPENSION ORAL at 03:04

## 2022-04-14 RX ADMIN — GUAIFENESIN 600 MG: 600 TABLET, EXTENDED RELEASE ORAL at 09:04

## 2022-04-14 RX ADMIN — IPRATROPIUM BROMIDE AND ALBUTEROL SULFATE 3 ML: 2.5; .5 SOLUTION RESPIRATORY (INHALATION) at 08:04

## 2022-04-14 RX ADMIN — TRIAMCINOLONE ACETONIDE: 1 CREAM TOPICAL at 09:04

## 2022-04-14 RX ADMIN — PIPERACILLIN SODIUM AND TAZOBACTAM SODIUM 4.5 G: 4; .5 INJECTION, POWDER, FOR SOLUTION INTRAVENOUS at 04:04

## 2022-04-14 RX ADMIN — Medication 1 CAPSULE: at 06:04

## 2022-04-14 RX ADMIN — FLUCONAZOLE 800 MG: 200 TABLET ORAL at 06:04

## 2022-04-14 RX ADMIN — BENZONATATE 100 MG: 100 CAPSULE, LIQUID FILLED ORAL at 08:04

## 2022-04-14 RX ADMIN — BENZONATATE 100 MG: 100 CAPSULE, LIQUID FILLED ORAL at 06:04

## 2022-04-14 RX ADMIN — FLUTICASONE FUROATE AND VILANTEROL TRIFENATATE 1 PUFF: 100; 25 POWDER RESPIRATORY (INHALATION) at 07:04

## 2022-04-14 RX ADMIN — PANTOPRAZOLE SODIUM 40 MG: 40 TABLET, DELAYED RELEASE ORAL at 08:04

## 2022-04-14 RX ADMIN — Medication 100 MG: at 06:04

## 2022-04-14 RX ADMIN — TRIAMCINOLONE ACETONIDE: 1 CREAM TOPICAL at 08:04

## 2022-04-14 RX ADMIN — GUAIFENESIN 600 MG: 600 TABLET, EXTENDED RELEASE ORAL at 08:04

## 2022-04-14 RX ADMIN — IPRATROPIUM BROMIDE AND ALBUTEROL SULFATE 3 ML: 2.5; .5 SOLUTION RESPIRATORY (INHALATION) at 07:04

## 2022-04-14 RX ADMIN — MUPIROCIN: 20 OINTMENT TOPICAL at 08:04

## 2022-04-14 RX ADMIN — TICAGRELOR 90 MG: 90 TABLET ORAL at 09:04

## 2022-04-14 RX ADMIN — ASPIRIN 81 MG: 81 TABLET, COATED ORAL at 06:04

## 2022-04-14 RX ADMIN — GABAPENTIN 100 MG: 100 CAPSULE ORAL at 09:04

## 2022-04-14 RX ADMIN — ATORVASTATIN CALCIUM 80 MG: 20 TABLET, FILM COATED ORAL at 08:04

## 2022-04-14 RX ADMIN — FERROUS SULFATE TAB 325 MG (65 MG ELEMENTAL FE) 1 EACH: 325 (65 FE) TAB at 08:04

## 2022-04-14 RX ADMIN — TIOTROPIUM BROMIDE INHALATION SPRAY 2 PUFF: 3.12 SPRAY, METERED RESPIRATORY (INHALATION) at 07:04

## 2022-04-14 RX ADMIN — PIPERACILLIN SODIUM AND TAZOBACTAM SODIUM 4.5 G: 4; .5 INJECTION, POWDER, FOR SOLUTION INTRAVENOUS at 10:04

## 2022-04-14 RX ADMIN — ISOSORBIDE MONONITRATE 30 MG: 30 TABLET, EXTENDED RELEASE ORAL at 08:04

## 2022-04-14 RX ADMIN — METOPROLOL SUCCINATE 50 MG: 50 TABLET, EXTENDED RELEASE ORAL at 08:04

## 2022-04-14 NOTE — PT/OT/SLP PROGRESS
"Physical Therapy  Treatment    Marisol Kerr   [id number]    Time Tracking:     PT Received On: 04/14/22   PT Start Time: 1035   PT Stop Time: 1058   PT Total Time (min): 23 min    Billable Minutes: Gait Training 10 and Therapeutic Activity 13 minutes      Recommendations:     Discharge recommendations: Home with family and HH PT/OT     Equipment recommendations: None (has all necessary DME in place at home)    Barriers to Discharge: None (daughter has been staying with patient as of late)    Patient Information:     Recent Surgery: Procedure(s) (LRB):  EGD (ESOPHAGOGASTRODUODENOSCOPY) (N/A) Day of Surgery    Diagnosis: NSTEMI (non-ST elevated myocardial infarction)    Length of Stay: 6 days    General Precautions: Standard, fall, aspiration  Orthopedic Precautions: None  Brace: None    Assessment:     Marisol Kerr tolerated treatment poorly today. She was sitting up in her bedside chair upon my entry to room. Immediately remarking to therapist, "Now listen, since I woke up this morning I've had incredible pain at my right big toe. Can you check to see if it's broken?" She is very tender to palpation, especially at proximal phalanx on R 1st toe. She was able to stand to a rolling walker with stand-by assistance but her overall functional mobility was quite limited compared to prior session on 4/12. Only able to walk 3 steps fwd/back with rolling walker and close stand-by assistance, on 2L o2; educated ducated on using 3-pt gait sequence (walker, right foot, left foot) in order to offload the painful R 1st big toe with standing using the walker. Overall distance limited by R 1st toe pain with standing, notified MD Ontiveros (primary) after session via [employer] (team thinking possible gout, looking into it further). Satting 98% on 2L at end of limited walking trial despite mild SOB. Discussed PT role, POC, goals and recommendations (Home with family and HH PT/OT; no DME needs) with patient; verbalized understanding. " "Marisol Kerr will continue to benefit from acute PT services to promote mobility during this admission and improve return to PLOF.    Problem List: weakness, decreased endurance, impaired self-care skills, impaired mobility, decreased sitting or standing balance, gait instability, impaired cardiopulmonary response to activity and pain    Rehab Prognosis: Good; patient would benefit from acute skilled PT services to address these deficits and reach maximum level of function.    Plan:     Patient to be seen 3 x/week to address the above listed problems via gait training, therapeutic activities, therapeutic exercises, neuromuscular re-education    Plan of Care Expires: 05/10/22  Plan of Care reviewed with: patient    Subjective:     Communicated with RN prior to treatment, appropriate to see for treatment.    Pt found sitting up in bedside chair upon PT entry to room, agreeable to treatment.    Patient commenting: "I woke up this morning and my big right toe was killing me! Maybe I twisted it in the bed last night. I could barely walk to the chair, I have to put all my weight on me heel now. Feels like a truck parked on it!"    Does this patient have any cultural, spiritual, Oriental orthodox conflicts given the current situation? Patient/family has no barriers to learning. Patient/family verbalizes understanding of his/her program and goals and demonstrates them correctly. No cultural, spiritual, or educational needs identified.    Objective:     Patient found with: telemetry, pulse ox (continuous), oxygen (2L)    Pain:  Pain Rating 1: 8/10 at R proximal phalanx of 1st toe at rest; pain increased with light palpation and weightbearing (MD notified via SecureChat)  Pain Rating Post-Intervention 1: 9/10 (see above) post-ambulation    Functional Mobility:    · Bed Mobility:  · NT; out of bed in chair before and after session    · Transfers:  · Sit to Stand: stand-by assistance from bedside chair with RW x 1 " "trial(s)    · Gait:  · 3 feet fwd/back in room with rolling walker and SBA-CGA of therapist; educated on using 3-pt gait sequence (walker, right foot, left foot) in order to offload the painful R 1st big toe with standing using the walker. Overall distance limited by R 1st toe pain with standing, notified MD Ontiveros (primary) after session via [employer] (team thinking possible gout, looking into it further)  · Satting 98% on 2L at end of limited walking trial despite mild SOB    · Assist level: SBA-CGA  · Device: Rolling walker   · Oxygen: 2L    · Balance:  · Static Sit: Supervision at edge of chair    · Static Stand: Stand-By Assist with Rolling walker    Additional Therapeutic Activity/Exercises:     1. She was sitting up in her bedside chair upon my entry to room. Immediately remarking to therapist, "Now listen, since I woke up this morning I've had incredible pain at my right big toe. Can you check to see if it's broken?" She is very tender to palpation, especially at proximal phalanx on R 1st toe.    2. She was able to stand to a rolling walker with stand-by assistance but her overall functional mobility was quite limited compared to prior session on 4/12. Only able to walk 3 steps fwd/back with rolling walker and close stand-by assistance, on 2L o2; educated ducated on using 3-pt gait sequence (walker, right foot, left foot) in order to offload the painful R 1st big toe with standing using the walker. Overall distance limited by R 1st toe pain with standing, notified MD Ontiveros (primary) after session via [employer] (team thinking possible gout, looking into it further). Satting 98% on 2L at end of limited walking trial despite mild SOB.    3. Discussed PT role, POC, goals and recommendations (Home with family and HH PT/OT; no DME needs) with patient; verbalized understanding.     AM-PAC 6 CLICK MOBILITY  Turning over in bed (including adjusting bedclothes, sheets and blankets)?: 4  Sitting down on and " standing up from a chair with arms (e.g., wheelchair, bedside commode, etc.): 4  Moving from lying on back to sitting on the side of the bed?: 4  Moving to and from a bed to a chair (including a wheelchair)?: 3  Need to walk in hospital room?: 3  Climbing 3-5 steps with a railing?: 2  Basic Mobility Total Score: 20    Patient was left sitting up in bedside chair with all lines intact, call button in reach and MD Ontiveros notified about patient's R 1st toe pain limiting activity today (via [employer] SecureChat)    GOALS:   Multidisciplinary Problems     Physical Therapy Goals        Problem: Physical Therapy    Goal Priority Disciplines Outcome Goal Variances Interventions   Physical Therapy Goal     PT, PT/OT Ongoing, Progressing     Description: Goals to be met by: 2022    Patient will increase functional independence with mobility by performin. Gait  x 25 feet with Supervision using Rolling Walker.   2. Stand for 3 minutes with Supervision using Rolling Walker  3. Lower extremity exercise program x15 reps per handout, with independence                 Maxime Long, PT   2022

## 2022-04-14 NOTE — ASSESSMENT & PLAN NOTE
Likely Pre-renal in the setting of decreased PO intake. Will hold lisinopril at this time. Cr 1.3 on arrival with baseline 1.3-1.6.  - Avoid nephrotoxins

## 2022-04-14 NOTE — PHYSICIAN QUERY
PT Name: Marisol Kerr  MR #: [id number]     DOCUMENTATION CLARIFICATION     CDS/: Moy Bernstein Jr, RN CCDS               Contact information:[email or web address]  This form is a permanent document in the medical record.     Query Date: April 14, 2022    By submitting this query, we are merely seeking further clarification of documentation.  Please utilize your independent clinical judgment when addressing the question(s) below.    The Medical Record contains the following:   Indicators   Supporting Clinical Findings Location in Medical Record   x Non-blanchable erythema/redness  Dry, sacral wound with mild erythema; no drainage or fluctuance    4/8 H&P   x Ulcer/Injury/Skin Breakdown Wound care consult received. Patient with documented partial thickness skin loss to the coccyx area that is pink 4/11 Wound Care Consult    Deep Tissue Injury     x Wound care consult Patient has been being treated for incontinence associated skin damage that was pink and partial thickness when at the SNF unit last month.  4/11 Wound Care Consult   x Acute/Chronic Illness HAP (hospital-acquired pneumonia) 4/13 Cardiology Progress Note     x Medication/Treatment Recommend to use critic aid clear to protect the skin from any further moisture or incontinence related damage    If the area looks like there is a rash, recommend to nursing to get order for miconazole ointment to protect from incontinence or other moisture while providing miconazole for antifungal coverage 4/11 Wound Care Consult        4/11 Wound Care Consult    Other       The clinical guidelines noted are only a system guideline. It does not replace the providers clinical judgment.    Per the National Pressure Injury Advisory Panel:   A pressure injury is localized damage to the skin and underlying soft tissue usually over a bony prominence or related to a medical or other device. The injury can present as intact skin or an open ulcer and may be painful. The  injury occurs as a result of intense and/or prolonged pressure or pressure in combination with shear. The tolerance of soft tissue for pressure and shear may also be affected by microclimate, nutrition, perfusion, co-morbidities and condition of the soft tissue.       Stage 1 Pressure Injury:  Intact skin with a localized area of non-blanchable erythema, which may appear differently in darkly pigmented skin. Color changes do not include purple or maroon discoloration; these may indicate deep tissue pressure injury.    Stage 2 Pressure Injury:  Partial-thickness loss of skin with exposed dermis. The wound bed is viable, pink or red, moist, and may also present as an intact or ruptured serum-filled blister.    Stage 3 Pressure Injury:  Full-thickness loss of skin, in which adipose (fat) is visible in the ulcer and granulation tissue and epibole (rolled wound edges) are often present. Slough and/or eschar may be visible. Undermining and tunneling may occur.    Stage 4 Pressure Injury:  Full-thickness skin and tissue loss with exposed or directly palpable fascia, muscle, tendon, ligament, cartilage or bone in the ulcer. Slough and/or eschar may be visible. Epibole (rolled edges), undermining and/or tunneling often occur.    Unstageable Pressure Injury:  Full-thickness skin and tissue loss in which the extent of tissue damage within the ulcer cannot be confirmed because it is obscured by slough or eschar. If slough or eschar is removed, a Stage 3 or Stage 4 pressure injury will be revealed.        Provider, please provide the integumentary diagnosis to the              Sacral Area              [ XXX  ] Pressure Injury/Decubitus Ulcer, Stage 2   [   ] Moisture Associated Skin Damage    [   ] Other Integumentary Diagnosis (please specify):   [   ] Clinically Undetermined               Please document in your progress notes daily for the duration of treatment until resolved and include in your discharge  summary.    Reference:    EMMA Quinones., AIMEE Holland., Goldberg, M., EMMA Nunez, EMMA Sewell, & DULCE MARAI Brandon. (2016). Revised National Pressure Ulcer Advisory Panel Pressure Injury Staging System: Revised Pressure Injury Staging System. J Wound Ostomy Continence Nurs, 43(6), 585-597. doi:10.1097/won.[id number]    Form No.[id number]

## 2022-04-14 NOTE — PLAN OF CARE
[facility] - Cardiology Stepdown  Discharge Reassessment    Primary Care Provider: Khadar Dwyer MD    Expected Discharge Date: 4/18/2022    Reassessment (most recent)     Discharge Reassessment - 04/14/22 0931        Discharge Reassessment    Assessment Type Discharge Planning Reassessment     Did the patient's condition or plan change since previous assessment? Yes     Communicated LUZ with patient/caregiver Date not available/Unable to determine     Discharge Plan A Home Health     Discharge Plan B Home with family     Discharge Barriers Identified None     Why the patient remains in the hospital Requires continued medical care        Post-Acute Status    Discharge Delays Procedure Scheduling (IR, OR, Labs, Echo, Cath, Echo, EEG)             Per treatment team pt is pending procedures.  Plan to discharge home with HH when medically cleared.  Will continue to follow.    Ronda Esquivel LMSW  [facility]  [id number]

## 2022-04-14 NOTE — PT/OT/SLP PROGRESS
"Speech Language Pathology Treatment    Patient Name:  Marisol Kerr   MRN:  [id number]  Admitting Diagnosis: NSTEMI (non-ST elevated myocardial infarction)    Recommendations:                 General Recommendations:  GI evaluation and check diet tolerance  Diet recommendations:  Regular, Liquid Diet Level: Thin   Aspiration Precautions: 1 bite/sip at a time, Eliminate distractions, HOB to 90 degrees, Remain upright 30 minutes post meal, Small bites/sips and Strict aspiration precautions   General Precautions: Standard, aspiration, fall (reflux)  Communication strategies:  appears to be somewhat [place]    Subjective     "They said I'm not supposed to eat anything."     Pain/Comfort:  · Pain Rating 1: 0/10    Respiratory Status: Nasal cannula, flow 2 L/min    Objective:     Has the patient been evaluated by SLP for swallowing?   Yes  Keep patient NPO? No   Current Respiratory Status:        Pt seen to ensure tolerance of thin liquids prior to participation in esophagram this afternoon.  Pt only given thin liquids due to NPO status for esophagram.  Pt tolerated thin liquids via straw x 2 without s/s of aspiration.  SLP to monitor for results of esophagram.  Per SLP's assessment on 4/13, pt appeared safe to resume regular consistency diet and thin liquids.  SLP to follow up according to POC.     Assessment:     Marisol Kerr is a 76 y.o. female.  Oral and pharyngeal phases of the swallow appear WFL.  Continued suspected GI component impacting swallowing function.  SLP to follow up following esophagram.     Goals:   Multidisciplinary Problems     SLP Goals        Problem: SLP    Goal Priority Disciplines Outcome   SLP Goal     SLP Ongoing, Progressing   Description: Speech Language Pathology Goals  Goals expected to be met by 4/19:  1. Pt will participate in ongoing assessment of swallow function given advanced textures to determine safest and least restrictive diet.              Multidisciplinary Problems (Resolved)        " Problem: SLP    Goal Priority Disciplines Outcome   SLP Goal   (Resolved)     SLP Met                   Plan:     · Patient to be seen:  3 x/week   · Plan of Care expires:     · Plan of Care reviewed with:  patient   · SLP Follow-Up:  Yes       Discharge recommendations:   (likely no ST needs)     Time Tracking:     SLP Treatment Date:   04/14/22  Speech Start Time:  1411  Speech Stop Time:  1423     Speech Total Time (min):  12 min    Billable Minutes: Treatment Swallowing Dysfunction 12    04/14/2022

## 2022-04-14 NOTE — ASSESSMENT & PLAN NOTE
Prior rectus sheath hematoma after angiogram 2/2022 requiring inferior epigastric artery embolization.   No s/s bleeding and on DAPT.     - Continue home ferrous sulfate  - Type and screen for potential procedure  - Trend CBC daily  - Transfuse to maintain Hb >8 with CAD

## 2022-04-14 NOTE — ASSESSMENT & PLAN NOTE
Admitted with acute onset chest pressure, EKG with lateral ST depressions, and troponin 0.06 (mildly elevated) all consistent with NSTEMI. Currently stable and asymptomatic. 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 Outpt plan to follow-up with Dr Chappell for high risk PCI. Troponin peaked at 0.07.     - On ASA/Brilinta. Heparin held from admission due to prior rectus sheath hematoma during last L heart cath.   - Continue atorvastatin, metoprolol  - SL Nitro PRN for chest pain  - patient will have outpatient high risk PCI tentatively scheduled for 4/17 w/ Dr. Chappell

## 2022-04-14 NOTE — SUBJECTIVE & OBJECTIVE
Interval History: Ready for esophagram evaluation today.     Objective:     Vital Signs (Most Recent):  Temp: 98.9 °F (37.2 °C) (04/14/22 1151)  Pulse: 81 (04/14/22 1202)  Resp: 16 (04/14/22 1202)  BP: (!) 100/54 (04/14/22 1151)  SpO2: 96 % (04/14/22 1202)   Vital Signs (24h Range):  Temp:  [97.9 °F (36.6 °C)-99.8 °F (37.7 °C)] 98.9 °F (37.2 °C)  Pulse:  [63-88] 81  Resp:  [16-18] 16  SpO2:  [93 %-100 %] 96 %  BP: (100-140)/(54-82) 100/54     Weight: 78.7 kg (173 lb 6.3 oz)  Body mass index is 30.71 kg/m².     SpO2: 96 %  O2 Device (Oxygen Therapy): nasal cannula      Intake/Output Summary (Last 24 hours) at 4/14/2022 1532  Last data filed at 4/14/2022 0800  Gross per 24 hour   Intake 1362.45 ml   Output 1450 ml   Net -87.55 ml       Lines/Drains/Airways       Drain  Duration             Female External Urinary Catheter 03/27/22 2000 17 days              Peripheral Intravenous Line  Duration                  Peripheral IV - Single Lumen 04/11/22 0800 20 G Lateral;Right Breast 3 days         Peripheral IV - Single Lumen 04/13/22 1330 20 G Anterior;Distal;Right Antecubital 1 day                    Physical Exam  Physical Exam  Vitals and nursing note reviewed.   Constitutional:       Appearance: Normal appearance.   HENT:      Head: Normocephalic and atraumatic.      Nose: Nose normal.      Mouth/Throat:      Mouth: Mucous membranes are moist.      Pharynx: Oropharynx is clear.      Comments: Oozing blister on bottom lip   Eyes:      Extraocular Movements: Extraocular movements intact.   Cardiovascular:      Rate and Rhythm: Normal rate and regular rhythm.      Pulses: Normal pulses.      Heart sounds: Normal heart sounds.   Pulmonary:      Effort: Pulmonary effort is normal.      Breath sounds: Normal breath sounds. No wheezing or rales.   Abdominal:      General: Bowel sounds are normal.      Palpations: Abdomen is soft.      Tenderness: There is no abdominal tenderness.      Hernia: A hernia (R hernia abdominal  hernia, reducable, non-painful) is present.   Musculoskeletal:         General: Normal range of motion.      Cervical back: Normal range of motion and neck supple.      Right lower leg: No edema.      Left lower leg: No edema.   Skin:     General: Skin is warm and dry.   Neurological:      General: No focal deficit present.      Mental Status: She is alert and oriented to person, place, and time.   Psychiatric:         Mood and Affect: Mood normal.         Behavior: Behavior normal.

## 2022-04-14 NOTE — ASSESSMENT & PLAN NOTE
Has persistent cough and increased sputum production. Not infectious appearing, afebrile and without leukocytosis. However, CXR w/ increased RLL and RML infiltrates. No improvement of symptoms w/ symptomatic treatment, thus will treat for HAP w/ Zosyn. Additionally, concerned for microaspiration given patient continues to regurgitate food - SLP consulted, appreciate recommendations. Consulted GI and SLP and will proceed with esophageal evaluation for dysphagia and globus sensation.   - Will complete a 5-7 day course of antibiotic therapy.

## 2022-04-14 NOTE — PROGRESS NOTES
[facility] - Cardiology Stepdown  Cardiology  Progress Note    Patient Name: Marisol Kerr  MRN: [id number]  Admission Date: 4/8/2022  Hospital Length of Stay: 6 days  Code Status: Full Code   Attending Physician: Karl Ontiveros MD   Primary Care Physician: Khadar Dwyer MD  Expected Discharge Date: 4/18/2022  Principal Problem:NSTEMI (non-ST elevated myocardial infarction)    Subjective:   Interval History: Ready for esophagram evaluation today.     Objective:     Vital Signs (Most Recent):  Temp: 98.9 °F (37.2 °C) (04/14/22 1151)  Pulse: 81 (04/14/22 1202)  Resp: 16 (04/14/22 1202)  BP: (!) 100/54 (04/14/22 1151)  SpO2: 96 % (04/14/22 1202)   Vital Signs (24h Range):  Temp:  [97.9 °F (36.6 °C)-99.8 °F (37.7 °C)] 98.9 °F (37.2 °C)  Pulse:  [63-88] 81  Resp:  [16-18] 16  SpO2:  [93 %-100 %] 96 %  BP: (100-140)/(54-82) 100/54     Assessment and Plan:     * NSTEMI (non-ST elevated myocardial infarction)  Admitted with acute onset chest pressure, EKG with lateral ST depressions, and troponin 0.06 (mildly elevated) all consistent with NSTEMI. Currently stable and asymptomatic. 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 Outpt plan to follow-up with Dr Chappell for high risk PCI. Troponin peaked at 0.07.     - On ASA/Brilinta. Heparin held from admission due to prior rectus sheath hematoma during last L heart cath.   - Continue atorvastatin, metoprolol  - SL Nitro PRN for chest pain  - patient will have outpatient high risk PCI tentatively scheduled for 4/17 w/ Dr. Chappell     HAP (hospital-acquired pneumonia)  Has persistent cough and increased sputum production. Not infectious appearing, afebrile and without leukocytosis. However, CXR w/ increased RLL and RML infiltrates. No improvement of symptoms w/ symptomatic treatment, thus will treat for HAP w/ Zosyn. Additionally, concerned for microaspiration given patient continues to regurgitate food - SLP consulted,  appreciate recommendations. Consulted GI and SLP and will proceed with esophageal evaluation for dysphagia and globus sensation.   - Will complete a 5-7 day course of antibiotic therapy.       Normocytic anemia  Prior rectus sheath hematoma after angiogram 2/2022 requiring inferior epigastric artery embolization.   No s/s bleeding and on DAPT.     - Continue home ferrous sulfate  - Type and screen for potential procedure  - Trend CBC daily  - Transfuse to maintain Hb >8 with CAD    Chronic diastolic congestive heart failure  Normal EF 2/2022 and no s/s of exacerbation. TTE on 04/09 with EF 60, hypokinetic basal inferoseptal and inferior hypokinesis.  - Holding Furosemide 40mg in the setting of hypotension and ISSA  - Daily weights, strict I/Os  - Not on MRA or SLGT2; can consider as outpt      Chronic hypoxemic respiratory failure  On NC at home and underlying COPD. No s/s exacerbation.  - Continue home inhaler regimen  - Albuterol nebs PRN  - Supplemental oxygen      CKD (chronic kidney disease), stage III  Likely Pre-renal in the setting of decreased PO intake. Will hold lisinopril at this time. Cr 1.3 on arrival with baseline 1.3-1.6.  - Avoid nephrotoxins    Gastroesophageal reflux disease without esophagitis  - Continue home PPI  - Treating empirically with Fluconazole (per GI)    Essential hypertension, benign  Continue medications as tolerated   Currently holding Lisinopril and Amlodipine for ISSA and hypotension, respectively.      Yassine Cueva MD  Cardiology  [facility] - Cardiology Stepdown      Weight: 78.7 kg (173 lb 6.3 oz)  Body mass index is 30.71 kg/m².     SpO2: 96 %  O2 Device (Oxygen Therapy): nasal cannula      Intake/Output Summary (Last 24 hours) at 4/14/2022 1532  Last data filed at 4/14/2022 0800  Gross per 24 hour   Intake 1362.45 ml   Output 1450 ml   Net -87.55 ml       Lines/Drains/Airways       Drain  Duration             Female External Urinary Catheter 03/27/22 2000 17 days               Peripheral Intravenous Line  Duration                  Peripheral IV - Single Lumen 04/11/22 0800 20 G Lateral;Right Breast 3 days         Peripheral IV - Single Lumen 04/13/22 1330 20 G Anterior;Distal;Right Antecubital 1 day                    Physical Exam  Physical Exam  Vitals and nursing note reviewed.   Constitutional:       Appearance: Normal appearance.   HENT:      Head: Normocephalic and atraumatic.      Nose: Nose normal.      Mouth/Throat:      Mouth: Mucous membranes are moist.      Pharynx: Oropharynx is clear.      Comments: Oozing blister on bottom lip   Eyes:      Extraocular Movements: Extraocular movements intact.   Cardiovascular:      Rate and Rhythm: Normal rate and regular rhythm.      Pulses: Normal pulses.      Heart sounds: Normal heart sounds.   Pulmonary:      Effort: Pulmonary effort is normal.      Breath sounds: Normal breath sounds. No wheezing or rales.   Abdominal:      General: Bowel sounds are normal.      Palpations: Abdomen is soft.      Tenderness: There is no abdominal tenderness.      Hernia: A hernia (R hernia abdominal hernia, reducable, non-painful) is present.   Musculoskeletal:         General: Normal range of motion.      Cervical back: Normal range of motion and neck supple.      Right lower leg: No edema.      Left lower leg: No edema.   Skin:     General: Skin is warm and dry.   Neurological:      General: No focal deficit present.      Mental Status: She is alert and oriented to person, place, and time.   Psychiatric:         Mood and Affect: Mood normal.         Behavior: Behavior normal.       VTE Risk Mitigation (From admission, onward)         Ordered     IP VTE HIGH RISK PATIENT  Once         04/08/22 2203     Place sequential compression device  Until discontinued         04/08/22 2203

## 2022-04-14 NOTE — ASSESSMENT & PLAN NOTE
Normal EF 2/2022 and no s/s of exacerbation. TTE on 04/09 with EF 60, hypokinetic basal inferoseptal and inferior hypokinesis.  - Holding Furosemide 40mg in the setting of hypotension and ISSA  - Daily weights, strict I/Os  - Not on MRA or SLGT2; can consider as outpt

## 2022-04-14 NOTE — ASSESSMENT & PLAN NOTE
Continue medications as tolerated   Currently holding Lisinopril and Amlodipine for ISSA and hypotension, respectively.

## 2022-04-14 NOTE — PT/OT/SLP PROGRESS
Occupational Therapy      Patient Name:  Marisol Kerr   MRN:  [id number]    Patient not seen today secondary to pt off the floor for EGD in the am and X-ray in the afternoon.  Will follow-up as scheduled per OT POC.    4/14/2022

## 2022-04-14 NOTE — PROGRESS NOTES
GI Treatment Plan    Marisol Kerr is a 76 y.o. female admitted to hospital 4/8/2022 (Hospital Day: 7) due to NSTEMI (non-ST elevated myocardial infarction).     Interval History  Patient too high risk for sedation due to CAD, pulmonary status. Since EGD is non-emergent, will defer at this time.    Objective  Temp:  [97.9 °F (36.6 °C)-99.8 °F (37.7 °C)] 99.8 °F (37.7 °C) (04/14 0727)  Pulse:  [63-87] 87 (04/14 0812)  BP: (111-140)/(56-82) 140/63 (04/14 0812)  Resp:  [16-18] 18 (04/14 0727)  SpO2:  [93 %-98 %] 97 % (04/14 0727)      Laboratory    Recent Labs   Lab 04/12/22  0550 04/13/22  0628 04/14/22  0730   HGB 10.1* 10.3* 9.8*         Assessment and Plan  - barium esophagram to evaluate for any obstructive lesions  - recommend ongoing SLP follow up  - PPI daily for possible esophagitis  - no obvious thrush, so cannot treat empirically for candida esophagitis  - nausea and cough management per primary team  - We will continue to follow.    Thank you for involving us in the care of Marisol Kerr. Please call with any additional questions, concerns or changes in the patient's clinical status.    Faustina Martinez MD  Gastroenterology Fellow, PGY V

## 2022-04-15 PROBLEM — K22.4 ESOPHAGEAL DYSMOTILITY: Status: ACTIVE | Noted: 2022-04-15

## 2022-04-15 LAB
BNP SERPL-MCNC: 279 PG/ML (ref 0–99)
MAGNESIUM SERPL-MCNC: 1.9 MG/DL (ref 1.6–2.6)
POCT GLUCOSE: 127 MG/DL (ref 70–110)
POCT GLUCOSE: 131 MG/DL (ref 70–110)
POCT GLUCOSE: 161 MG/DL (ref 70–110)
POCT GLUCOSE: 96 MG/DL (ref 70–110)
TROPONIN I SERPL DL<=0.01 NG/ML-MCNC: 0.03 NG/ML (ref 0–0.03)

## 2022-04-15 PROCEDURE — 94667 MNPJ CHEST WALL 1ST: CPT

## 2022-04-15 PROCEDURE — 94640 AIRWAY INHALATION TREATMENT: CPT

## 2022-04-15 PROCEDURE — 27000221 HC OXYGEN, UP TO 24 HOURS

## 2022-04-15 PROCEDURE — 84484 ASSAY OF TROPONIN QUANT: CPT

## 2022-04-15 PROCEDURE — 25000003 PHARM REV CODE 250

## 2022-04-15 PROCEDURE — 94761 N-INVAS EAR/PLS OXIMETRY MLT: CPT

## 2022-04-15 PROCEDURE — 25000242 PHARM REV CODE 250 ALT 637 W/ HCPCS

## 2022-04-15 PROCEDURE — 25000003 PHARM REV CODE 250: Performed by: INTERNAL MEDICINE

## 2022-04-15 PROCEDURE — 63600175 PHARM REV CODE 636 W HCPCS

## 2022-04-15 PROCEDURE — 93010 EKG 12-LEAD: ICD-10-PCS | Mod: ,,, | Performed by: INTERNAL MEDICINE

## 2022-04-15 PROCEDURE — 93010 ELECTROCARDIOGRAM REPORT: CPT | Mod: ,,, | Performed by: INTERNAL MEDICINE

## 2022-04-15 PROCEDURE — 25000003 PHARM REV CODE 250: Performed by: STUDENT IN AN ORGANIZED HEALTH CARE EDUCATION/TRAINING PROGRAM

## 2022-04-15 PROCEDURE — 99231 PR SUBSEQUENT HOSPITAL CARE,LEVL I: ICD-10-PCS | Mod: GC,,, | Performed by: INTERNAL MEDICINE

## 2022-04-15 PROCEDURE — 83735 ASSAY OF MAGNESIUM: CPT | Performed by: INTERNAL MEDICINE

## 2022-04-15 PROCEDURE — 36415 COLL VENOUS BLD VENIPUNCTURE: CPT | Performed by: INTERNAL MEDICINE

## 2022-04-15 PROCEDURE — 93005 ELECTROCARDIOGRAM TRACING: CPT

## 2022-04-15 PROCEDURE — 99231 SBSQ HOSP IP/OBS SF/LOW 25: CPT | Mod: GC,,, | Performed by: INTERNAL MEDICINE

## 2022-04-15 PROCEDURE — 20600001 HC STEP DOWN PRIVATE ROOM

## 2022-04-15 PROCEDURE — 99900035 HC TECH TIME PER 15 MIN (STAT)

## 2022-04-15 PROCEDURE — 83880 ASSAY OF NATRIURETIC PEPTIDE: CPT | Performed by: INTERNAL MEDICINE

## 2022-04-15 PROCEDURE — 25000242 PHARM REV CODE 250 ALT 637 W/ HCPCS: Performed by: INTERNAL MEDICINE

## 2022-04-15 RX ORDER — FUROSEMIDE 10 MG/ML
80 INJECTION INTRAMUSCULAR; INTRAVENOUS
Status: DISCONTINUED | OUTPATIENT
Start: 2022-04-16 | End: 2022-04-17

## 2022-04-15 RX ORDER — PANTOPRAZOLE SODIUM 40 MG/1
40 TABLET, DELAYED RELEASE ORAL 2 TIMES DAILY
Status: DISCONTINUED | OUTPATIENT
Start: 2022-04-15 | End: 2022-04-19

## 2022-04-15 RX ORDER — PREDNISONE 20 MG/1
40 TABLET ORAL DAILY
Status: COMPLETED | OUTPATIENT
Start: 2022-04-15 | End: 2022-04-19

## 2022-04-15 RX ORDER — FUROSEMIDE 10 MG/ML
80 INJECTION INTRAMUSCULAR; INTRAVENOUS ONCE
Status: COMPLETED | OUTPATIENT
Start: 2022-04-15 | End: 2022-04-15

## 2022-04-15 RX ORDER — AZITHROMYCIN 250 MG/1
500 TABLET, FILM COATED ORAL DAILY
Status: COMPLETED | OUTPATIENT
Start: 2022-04-16 | End: 2022-04-18

## 2022-04-15 RX ADMIN — NITROGLYCERIN 0.4 MG: 0.4 TABLET, ORALLY DISINTEGRATING SUBLINGUAL at 07:04

## 2022-04-15 RX ADMIN — Medication 1 CAPSULE: at 06:04

## 2022-04-15 RX ADMIN — TICAGRELOR 90 MG: 90 TABLET ORAL at 09:04

## 2022-04-15 RX ADMIN — IPRATROPIUM BROMIDE AND ALBUTEROL SULFATE 3 ML: 2.5; .5 SOLUTION RESPIRATORY (INHALATION) at 12:04

## 2022-04-15 RX ADMIN — IPRATROPIUM BROMIDE AND ALBUTEROL SULFATE 3 ML: 2.5; .5 SOLUTION RESPIRATORY (INHALATION) at 07:04

## 2022-04-15 RX ADMIN — TIOTROPIUM BROMIDE INHALATION SPRAY 2 PUFF: 3.12 SPRAY, METERED RESPIRATORY (INHALATION) at 07:04

## 2022-04-15 RX ADMIN — PREDNISONE 40 MG: 20 TABLET ORAL at 05:04

## 2022-04-15 RX ADMIN — BENZONATATE 100 MG: 100 CAPSULE, LIQUID FILLED ORAL at 09:04

## 2022-04-15 RX ADMIN — PIPERACILLIN SODIUM AND TAZOBACTAM SODIUM 4.5 G: 4; .5 INJECTION, POWDER, FOR SOLUTION INTRAVENOUS at 08:04

## 2022-04-15 RX ADMIN — ATORVASTATIN CALCIUM 80 MG: 20 TABLET, FILM COATED ORAL at 10:04

## 2022-04-15 RX ADMIN — PANTOPRAZOLE SODIUM 40 MG: 40 TABLET, DELAYED RELEASE ORAL at 11:04

## 2022-04-15 RX ADMIN — FERROUS SULFATE TAB 325 MG (65 MG ELEMENTAL FE) 1 EACH: 325 (65 FE) TAB at 10:04

## 2022-04-15 RX ADMIN — FLUCONAZOLE 400 MG: 200 TABLET ORAL at 10:04

## 2022-04-15 RX ADMIN — GABAPENTIN 100 MG: 100 CAPSULE ORAL at 09:04

## 2022-04-15 RX ADMIN — TICAGRELOR 90 MG: 90 TABLET ORAL at 10:04

## 2022-04-15 RX ADMIN — PIPERACILLIN SODIUM AND TAZOBACTAM SODIUM 4.5 G: 4; .5 INJECTION, POWDER, FOR SOLUTION INTRAVENOUS at 04:04

## 2022-04-15 RX ADMIN — GUAIFENESIN 600 MG: 600 TABLET, EXTENDED RELEASE ORAL at 10:04

## 2022-04-15 RX ADMIN — GUAIFENESIN 600 MG: 600 TABLET, EXTENDED RELEASE ORAL at 09:04

## 2022-04-15 RX ADMIN — ZOLPIDEM TARTRATE 5 MG: 5 TABLET ORAL at 09:04

## 2022-04-15 RX ADMIN — FLUTICASONE FUROATE AND VILANTEROL TRIFENATATE 1 PUFF: 100; 25 POWDER RESPIRATORY (INHALATION) at 07:04

## 2022-04-15 RX ADMIN — ASPIRIN 81 MG: 81 TABLET, COATED ORAL at 06:04

## 2022-04-15 RX ADMIN — TRIAMCINOLONE ACETONIDE: 1 CREAM TOPICAL at 10:04

## 2022-04-15 RX ADMIN — PANTOPRAZOLE SODIUM 40 MG: 40 TABLET, DELAYED RELEASE ORAL at 09:04

## 2022-04-15 RX ADMIN — ISOSORBIDE MONONITRATE 30 MG: 30 TABLET, EXTENDED RELEASE ORAL at 10:04

## 2022-04-15 RX ADMIN — FUROSEMIDE 80 MG: 10 INJECTION, SOLUTION INTRAMUSCULAR; INTRAVENOUS at 05:04

## 2022-04-15 RX ADMIN — MUPIROCIN: 20 OINTMENT TOPICAL at 10:04

## 2022-04-15 RX ADMIN — PIPERACILLIN SODIUM AND TAZOBACTAM SODIUM 4.5 G: 4; .5 INJECTION, POWDER, FOR SOLUTION INTRAVENOUS at 01:04

## 2022-04-15 RX ADMIN — Medication 100 MG: at 06:04

## 2022-04-15 RX ADMIN — METOPROLOL SUCCINATE 50 MG: 50 TABLET, EXTENDED RELEASE ORAL at 10:04

## 2022-04-15 NOTE — ASSESSMENT & PLAN NOTE
Normal EF 2/2022 and no s/s of exacerbation. TTE on 04/09 with EF 60, hypokinetic basal inferoseptal and inferior hypokinesis.  - Holding Furosemide 40mg in the setting of hypotension and ISSA  - Daily weights, strict I/Os  - Not on MRA or SLGT2; can consider as outpt  - cough may be secondary to pulmonary congestion - will obtain BNP

## 2022-04-15 NOTE — ASSESSMENT & PLAN NOTE
- esophogram done 4/14 showed esophageal dysmotility characterized by diminished secondary waves and presence of tertiary contractions.   - will reach out to GI for further recommendations in regard to management.

## 2022-04-15 NOTE — NURSING
Pt alert and oriented x4. PERRLA intact. Vital signs stable on 3L NC. Pt c/o sudden chest pain. Pt states she feels as though she is having a heart attack. Pain 10/10. No radiating pain. Administered PRN nitro SL. Notified Dr. Cueva. Physician ordered STAT EKG and troponin. Will continue to monitor.

## 2022-04-15 NOTE — PROGRESS NOTES
[facility] - Cardiology Stepdown  Cardiology  Progress Note    Patient Name: Marisol Kerr  MRN: [id number]  Admission Date: 4/8/2022  Hospital Length of Stay: 7 days  Code Status: Full Code   Attending Physician: Kaycee Mitchell MD   Primary Care Physician: Khadar Dwyer MD  Expected Discharge Date: 4/18/2022  Principal Problem:NSTEMI (non-ST elevated myocardial infarction)    Subjective:     Hospital Course:   Patient was asymptomatic on DAPT and Heparin, however, heparin was discontinued on arrival in the setting of known history of rectus sheath hematoma which occurred as a post-C complication. Patient originally scheduled to undergo high risk PCI whilst inpatient w/ Dr. Chappell but as per interventional cardiology, this will now take place 4/17/2022 in the outpatient setting. Of note, patient has developed worsening cough w/ increased clear sputum production and pleuritic chest pain. She remains afebrile and overall non infectious appearing. Repeat CXR w/ increased RML and RLL infiltrates. Patient's cough has not improved w/ symptomatic treatment. Given her high risk of decompensation, we will initiate HAP treatment w/ Zosyn. Additionally, concern for microaspiration 2/2 ?oropharyngeal dysphasia vs globus sensation - evaluated by SLP and GI evaluation recommended. Esophogram done 4/14/22 shows esophageal dysmotility characterized by dimished secondary waves and presence of tertiary contractions. Cr 1.4 --> 2.0; likely pre-renal in the setting of decreased intake. Will administer IVFs PRN and continue to monitor. Now improving. Anti-hypertensives held in the setting of borderline hypotension.       Interval History: Patient continues to have cough and congestion. Will continue to treat for HAP, however, cough may be 2/2 pulmonary congestion; will obtain BNP on today's labs. EGD w/ esophageal motility - will reach out to GI.     Review of Systems   Constitutional: Negative for chills, decreased appetite,  diaphoresis, fever, malaise/fatigue and weight gain.   HENT: Negative.  Negative for congestion and sore throat.    Eyes:  Negative for blurred vision and double vision.   Cardiovascular:  Negative for chest pain, dyspnea on exertion, irregular heartbeat, leg swelling and palpitations.   Respiratory:  Positive for cough. Negative for hemoptysis, shortness of breath and wheezing.    Hematologic/Lymphatic: Positive for bleeding problem. Bruises/bleeds easily.   Skin: Negative.  Negative for itching and rash.   Musculoskeletal:  Positive for arthritis. Negative for falls and joint pain.   Gastrointestinal:  Positive for nausea and vomiting. Negative for bloating, abdominal pain, constipation, diarrhea and melena.   Genitourinary: Negative.  Negative for hematuria and urgency.   Neurological:  Negative for dizziness, headaches, light-headedness and numbness.   Psychiatric/Behavioral: Negative.  Negative for altered mental status and depression.    Objective:     Vital Signs (Most Recent):  Temp: 98 °F (36.7 °C) (04/15/22 0431)  Pulse: 101 (04/15/22 0759)  Resp: 18 (04/15/22 0759)  BP: (!) 112/56 (04/15/22 0759)  SpO2: 95 % (04/15/22 0759)   Vital Signs (24h Range):  Temp:  [98 °F (36.7 °C)-99 °F (37.2 °C)] 98 °F (36.7 °C)  Pulse:  [] 101  Resp:  [16-22] 18  SpO2:  [95 %-100 %] 95 %  BP: (100-157)/(54-65) 112/56     Weight: 78.7 kg (173 lb 6.3 oz)  Body mass index is 30.71 kg/m².     SpO2: 95 %  O2 Device (Oxygen Therapy): nasal cannula      Intake/Output Summary (Last 24 hours) at 4/15/2022 1028  Last data filed at 4/15/2022 0600  Gross per 24 hour   Intake 722 ml   Output 1300 ml   Net -578 ml         Lines/Drains/Airways       Drain  Duration             Female External Urinary Catheter 03/27/22 2000 18 days              Peripheral Intravenous Line  Duration                  Peripheral IV - Single Lumen 04/11/22 0800 20 G Lateral;Right Breast 4 days         Peripheral IV - Single Lumen 04/13/22 1330 20 G  Anterior;Distal;Right Antecubital 1 day                    Physical Exam  Vitals and nursing note reviewed.   Constitutional:       Appearance: Normal appearance.   HENT:      Head: Normocephalic and atraumatic.      Nose: Nose normal.      Mouth/Throat:      Mouth: Mucous membranes are moist.      Pharynx: Oropharynx is clear.      Comments: Oozing blister on bottom lip   Eyes:      Extraocular Movements: Extraocular movements intact.   Cardiovascular:      Rate and Rhythm: Normal rate and regular rhythm.      Pulses: Normal pulses.      Heart sounds: Normal heart sounds.   Pulmonary:      Effort: Pulmonary effort is normal.      Breath sounds: Normal breath sounds. No wheezing or rales.   Abdominal:      General: Bowel sounds are normal.      Palpations: Abdomen is soft.      Tenderness: There is no abdominal tenderness.      Hernia: A hernia (R hernia abdominal hernia, reducable, non-painful) is present.   Musculoskeletal:         General: Normal range of motion.      Cervical back: Normal range of motion and neck supple.      Right lower leg: No edema.      Left lower leg: No edema.   Skin:     General: Skin is warm and dry.   Neurological:      General: No focal deficit present.      Mental Status: She is alert and oriented to person, place, and time.   Psychiatric:         Mood and Affect: Mood normal.         Behavior: Behavior normal.       Significant Labs: CMP   Recent Labs   Lab 04/14/22  0730      K 4.3      CO2 25   GLU 96   BUN 56*   CREATININE 1.8*   CALCIUM 8.2*   PROT 5.2*   ALBUMIN 2.5*   BILITOT 0.3   ALKPHOS 72   AST 12   ALT 11   ANIONGAP 9   ESTGFRAFRICA 31.5*   EGFRNONAA 27.3*      and CBC   Recent Labs   Lab 04/14/22  0730   WBC 9.69   HGB 9.8*   HCT 31.9*            Significant Imaging: Reviewed     Assessment and Plan:       * NSTEMI (non-ST elevated myocardial infarction)  Admitted with acute onset chest pressure, EKG with lateral ST depressions, and troponin 0.06  (mildly elevated) all consistent with NSTEMI. Currently stable and asymptomatic. 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 Outpt plan to follow-up with Dr Chappell for high risk PCI. Troponin peaked at 0.07.     - On ASA/Brilinta. Heparin held from admission due to prior rectus sheath hematoma during last L heart cath.   - Continue atorvastatin, metoprolol  - SL Nitro PRN for chest pain  - patient will have outpatient high risk PCI tentatively scheduled for 4/17 w/ Dr. Chappell     Esophageal dysmotility  - esophogram done 4/14 showed esophageal dysmotility characterized by diminished secondary waves and presence of tertiary contractions.   - will reach out to GI for further recommendations in regard to management.     HAP (hospital-acquired pneumonia)  Has persistent cough and increased sputum production. Not infectious appearing, afebrile and without leukocytosis. However, CXR w/ increased RLL and RML infiltrates. No improvement of symptoms w/ symptomatic treatment, thus will treat for HAP w/ Zosyn. Additionally, concerned for microaspiration given patient continues to regurgitate food - SLP consulted, appreciate recommendations. Consulted GI and SLP and will proceed with esophageal evaluation for dysphagia and globus sensation.   - Will complete a 5-7 day course of antibiotic therapy.       Normocytic anemia  Prior rectus sheath hematoma after angiogram 2/2022 requiring inferior epigastric artery embolization.   No s/s bleeding and on DAPT.     - Continue home ferrous sulfate  - Type and screen for potential procedure  - Trend CBC daily  - Transfuse to maintain Hb >8 with CAD    Chronic diastolic congestive heart failure  Normal EF 2/2022 and no s/s of exacerbation. TTE on 04/09 with EF 60, hypokinetic basal inferoseptal and inferior hypokinesis.  - Holding Furosemide 40mg in the setting of hypotension and ISSA  - Daily weights, strict I/Os  - Not on MRA or SLGT2;  can consider as outpt  - cough may be secondary to pulmonary congestion - will obtain BNP       Chronic hypoxemic respiratory failure  On NC at home and underlying COPD. No s/s exacerbation.  - Continue home inhaler regimen  - Albuterol nebs PRN  - Supplemental oxygen      CKD (chronic kidney disease), stage III  Likely Pre-renal in the setting of decreased PO intake. Will hold lisinopril at this time. Cr 1.3 on arrival with baseline 1.3-1.6.  - Avoid nephrotoxins  - renally dose medications  - Daily BMP to monitor electrolytes and Cr    Gastroesophageal reflux disease without esophagitis  - Continue home PPI  - Treating empirically with Fluconazole (per GI)    Essential hypertension, benign  Continue medications as tolerated   Currently holding Lisinopril and Amlodipine for ISSA and hypotension, respectively.        VTE Risk Mitigation (From admission, onward)         Ordered     IP VTE HIGH RISK PATIENT  Once         04/08/22 2203     Place sequential compression device  Until discontinued         04/08/22 2203                Estrellita Salinas MD  Cardiology  [facility] - Cardiology Stepdown

## 2022-04-15 NOTE — NURSING
Pt tolerated x ray well. However, pt unable to put full weight on right foot. Pt states her foot has been hurting since this morning when she woke up. Pulses palpable. Notified the team. No new orders at this time.

## 2022-04-15 NOTE — PLAN OF CARE
Problem: Adult Inpatient Plan of Care  Goal: Plan of Care Review  Outcome: Ongoing, Progressing  Goal: Patient-Specific Goal (Individualized)  Outcome: Ongoing, Progressing  Goal: Absence of Hospital-Acquired Illness or Injury  Outcome: Ongoing, Progressing  Goal: Optimal Comfort and Wellbeing  Outcome: Ongoing, Progressing  Goal: Readiness for Transition of Care  Outcome: Ongoing, Progressing     Problem: Diabetes Comorbidity  Goal: Blood Glucose Level Within Targeted Range  Outcome: Ongoing, Progressing     Problem: Impaired Wound Healing  Goal: Optimal Wound Healing  Outcome: Ongoing, Progressing     Problem: Fall Injury Risk  Goal: Absence of Fall and Fall-Related Injury  Outcome: Ongoing, Progressing     Problem: Skin Injury Risk Increased  Goal: Skin Health and Integrity  Outcome: Ongoing, Progressing     Problem: Fluid Imbalance (Pneumonia)  Goal: Fluid Balance  Outcome: Ongoing, Progressing     Problem: Infection (Pneumonia)  Goal: Resolution of Infection Signs and Symptoms  Outcome: Ongoing, Progressing     Problem: Respiratory Compromise (Pneumonia)  Goal: Effective Oxygenation and Ventilation  Outcome: Ongoing, Progressing

## 2022-04-15 NOTE — ASSESSMENT & PLAN NOTE
Likely Pre-renal in the setting of decreased PO intake. Will hold lisinopril at this time. Cr 1.3 on arrival with baseline 1.3-1.6.  - Avoid nephrotoxins  - renally dose medications  - Daily BMP to monitor electrolytes and Cr

## 2022-04-15 NOTE — SUBJECTIVE & OBJECTIVE
Interval History: Patient continues to have cough and congestion. Will continue to treat for HAP, however, cough may be 2/2 pulmonary congestion; will obtain BNP on today's labs. EGD w/ esophageal motility - will reach out to GI.     Review of Systems   Constitutional: Negative for chills, decreased appetite, diaphoresis, fever, malaise/fatigue and weight gain.   HENT: Negative.  Negative for congestion and sore throat.    Eyes:  Negative for blurred vision and double vision.   Cardiovascular:  Negative for chest pain, dyspnea on exertion, irregular heartbeat, leg swelling and palpitations.   Respiratory:  Positive for cough. Negative for hemoptysis, shortness of breath and wheezing.    Hematologic/Lymphatic: Positive for bleeding problem. Bruises/bleeds easily.   Skin: Negative.  Negative for itching and rash.   Musculoskeletal:  Positive for arthritis. Negative for falls and joint pain.   Gastrointestinal:  Positive for nausea and vomiting. Negative for bloating, abdominal pain, constipation, diarrhea and melena.   Genitourinary: Negative.  Negative for hematuria and urgency.   Neurological:  Negative for dizziness, headaches, light-headedness and numbness.   Psychiatric/Behavioral: Negative.  Negative for altered mental status and depression.    Objective:     Vital Signs (Most Recent):  Temp: 98 °F (36.7 °C) (04/15/22 0431)  Pulse: 101 (04/15/22 0759)  Resp: 18 (04/15/22 0759)  BP: (!) 112/56 (04/15/22 0759)  SpO2: 95 % (04/15/22 0759)   Vital Signs (24h Range):  Temp:  [98 °F (36.7 °C)-99 °F (37.2 °C)] 98 °F (36.7 °C)  Pulse:  [] 101  Resp:  [16-22] 18  SpO2:  [95 %-100 %] 95 %  BP: (100-157)/(54-65) 112/56     Weight: 78.7 kg (173 lb 6.3 oz)  Body mass index is 30.71 kg/m².     SpO2: 95 %  O2 Device (Oxygen Therapy): nasal cannula      Intake/Output Summary (Last 24 hours) at 4/15/2022 1028  Last data filed at 4/15/2022 0600  Gross per 24 hour   Intake 722 ml   Output 1300 ml   Net -578 ml          Lines/Drains/Airways       Drain  Duration             Female External Urinary Catheter 03/27/22 2000 18 days              Peripheral Intravenous Line  Duration                  Peripheral IV - Single Lumen 04/11/22 0800 20 G Lateral;Right Breast 4 days         Peripheral IV - Single Lumen 04/13/22 1330 20 G Anterior;Distal;Right Antecubital 1 day                    Physical Exam  Vitals and nursing note reviewed.   Constitutional:       Appearance: Normal appearance.   HENT:      Head: Normocephalic and atraumatic.      Nose: Nose normal.      Mouth/Throat:      Mouth: Mucous membranes are moist.      Pharynx: Oropharynx is clear.      Comments: Oozing blister on bottom lip   Eyes:      Extraocular Movements: Extraocular movements intact.   Cardiovascular:      Rate and Rhythm: Normal rate and regular rhythm.      Pulses: Normal pulses.      Heart sounds: Normal heart sounds.   Pulmonary:      Effort: Pulmonary effort is normal.      Breath sounds: Normal breath sounds. No wheezing or rales.   Abdominal:      General: Bowel sounds are normal.      Palpations: Abdomen is soft.      Tenderness: There is no abdominal tenderness.      Hernia: A hernia (R hernia abdominal hernia, reducable, non-painful) is present.   Musculoskeletal:         General: Normal range of motion.      Cervical back: Normal range of motion and neck supple.      Right lower leg: No edema.      Left lower leg: No edema.   Skin:     General: Skin is warm and dry.   Neurological:      General: No focal deficit present.      Mental Status: She is alert and oriented to person, place, and time.   Psychiatric:         Mood and Affect: Mood normal.         Behavior: Behavior normal.       Significant Labs: CMP   Recent Labs   Lab 04/14/22  0730      K 4.3      CO2 25   GLU 96   BUN 56*   CREATININE 1.8*   CALCIUM 8.2*   PROT 5.2*   ALBUMIN 2.5*   BILITOT 0.3   ALKPHOS 72   AST 12   ALT 11   ANIONGAP 9   ESTGFRAFRICA 31.5*    EGFRNONAA 27.3*      and CBC   Recent Labs   Lab 04/14/22  0730   WBC 9.69   HGB 9.8*   HCT 31.9*            Significant Imaging: Reviewed

## 2022-04-15 NOTE — PROGRESS NOTES
GI Treatment Plan    Marisol Kerr is a 76 y.o. female admitted to hospital 4/8/2022 (Hospital Day: 8) due to NSTEMI (non-ST elevated myocardial infarction).     Interval History  Esophagram with no stenosis or obstruction, shows esophageal dysmotility.    Objective  Temp:  [97.9 °F (36.6 °C)-99 °F (37.2 °C)] 97.9 °F (36.6 °C) (04/15 1137)  Pulse:  [] 82 (04/15 1237)  BP: (112-157)/() 154/120 (04/15 1137)  Resp:  [17-22] 20 (04/15 1237)  SpO2:  [95 %-98 %] 98 % (04/15 1237)      Laboratory    Recent Labs   Lab 04/12/22  0550 04/13/22  0628 04/14/22  0730   HGB 10.1* 10.3* 9.8*         Assessment and Plan  - no anatomical obstruction on esophagram, dysphagia is likely due to dysmotility  - would recommend mechanical soft diet  - anticholinergics are high risk in this patient, therefore would avoid any motility altering medications at this time  - We will continue to follow.    Thank you for involving us in the care of Marisol Kerr. Please call with any additional questions, concerns or changes in the patient's clinical status.    Faustina Martinez MD  Gastroenterology Fellow, PGY V

## 2022-04-16 LAB
ALBUMIN SERPL BCP-MCNC: 2.6 G/DL (ref 3.5–5.2)
ALP SERPL-CCNC: 69 U/L (ref 55–135)
ALT SERPL W/O P-5'-P-CCNC: 11 U/L (ref 10–44)
ANION GAP SERPL CALC-SCNC: 14 MMOL/L (ref 8–16)
AST SERPL-CCNC: 14 U/L (ref 10–40)
BASOPHILS # BLD AUTO: 0.03 K/UL (ref 0–0.2)
BASOPHILS NFR BLD: 0.3 % (ref 0–1.9)
BILIRUB SERPL-MCNC: 0.4 MG/DL (ref 0.1–1)
BUN SERPL-MCNC: 34 MG/DL (ref 8–23)
CALCIUM SERPL-MCNC: 8.7 MG/DL (ref 8.7–10.5)
CHLORIDE SERPL-SCNC: 104 MMOL/L (ref 95–110)
CO2 SERPL-SCNC: 25 MMOL/L (ref 23–29)
CREAT SERPL-MCNC: 1.7 MG/DL (ref 0.5–1.4)
DIFFERENTIAL METHOD: ABNORMAL
EOSINOPHIL # BLD AUTO: 0 K/UL (ref 0–0.5)
EOSINOPHIL NFR BLD: 0 % (ref 0–8)
ERYTHROCYTE [DISTWIDTH] IN BLOOD BY AUTOMATED COUNT: 13.3 % (ref 11.5–14.5)
EST. GFR  (AFRICAN AMERICAN): 33.8 ML/MIN/1.73 M^2
EST. GFR  (NON AFRICAN AMERICAN): 29.3 ML/MIN/1.73 M^2
GLUCOSE SERPL-MCNC: 177 MG/DL (ref 70–110)
HCT VFR BLD AUTO: 32.5 % (ref 37–48.5)
HGB BLD-MCNC: 10 G/DL (ref 12–16)
IMM GRANULOCYTES # BLD AUTO: 0.09 K/UL (ref 0–0.04)
IMM GRANULOCYTES NFR BLD AUTO: 0.8 % (ref 0–0.5)
LYMPHOCYTES # BLD AUTO: 0.4 K/UL (ref 1–4.8)
LYMPHOCYTES NFR BLD: 3.6 % (ref 18–48)
MAGNESIUM SERPL-MCNC: 1.9 MG/DL (ref 1.6–2.6)
MCH RBC QN AUTO: 28.2 PG (ref 27–31)
MCHC RBC AUTO-ENTMCNC: 30.8 G/DL (ref 32–36)
MCV RBC AUTO: 92 FL (ref 82–98)
MONOCYTES # BLD AUTO: 0.1 K/UL (ref 0.3–1)
MONOCYTES NFR BLD: 0.7 % (ref 4–15)
NEUTROPHILS # BLD AUTO: 10.3 K/UL (ref 1.8–7.7)
NEUTROPHILS NFR BLD: 94.6 % (ref 38–73)
NRBC BLD-RTO: 0 /100 WBC
PLATELET # BLD AUTO: 188 K/UL (ref 150–450)
PMV BLD AUTO: 11.6 FL (ref 9.2–12.9)
POCT GLUCOSE: 156 MG/DL (ref 70–110)
POCT GLUCOSE: 189 MG/DL (ref 70–110)
POCT GLUCOSE: 198 MG/DL (ref 70–110)
POCT GLUCOSE: 222 MG/DL (ref 70–110)
POTASSIUM SERPL-SCNC: 4.3 MMOL/L (ref 3.5–5.1)
PROT SERPL-MCNC: 5.6 G/DL (ref 6–8.4)
RBC # BLD AUTO: 3.55 M/UL (ref 4–5.4)
SODIUM SERPL-SCNC: 143 MMOL/L (ref 136–145)
WBC # BLD AUTO: 10.86 K/UL (ref 3.9–12.7)

## 2022-04-16 PROCEDURE — 99231 SBSQ HOSP IP/OBS SF/LOW 25: CPT | Mod: GC,,, | Performed by: INTERNAL MEDICINE

## 2022-04-16 PROCEDURE — 63600175 PHARM REV CODE 636 W HCPCS

## 2022-04-16 PROCEDURE — 25000242 PHARM REV CODE 250 ALT 637 W/ HCPCS

## 2022-04-16 PROCEDURE — 63600175 PHARM REV CODE 636 W HCPCS: Performed by: INTERNAL MEDICINE

## 2022-04-16 PROCEDURE — 94640 AIRWAY INHALATION TREATMENT: CPT

## 2022-04-16 PROCEDURE — 94799 UNLISTED PULMONARY SVC/PX: CPT

## 2022-04-16 PROCEDURE — 99231 PR SUBSEQUENT HOSPITAL CARE,LEVL I: ICD-10-PCS | Mod: GC,,, | Performed by: INTERNAL MEDICINE

## 2022-04-16 PROCEDURE — 99900035 HC TECH TIME PER 15 MIN (STAT)

## 2022-04-16 PROCEDURE — 25000003 PHARM REV CODE 250: Performed by: INTERNAL MEDICINE

## 2022-04-16 PROCEDURE — 97530 THERAPEUTIC ACTIVITIES: CPT

## 2022-04-16 PROCEDURE — 93010 ELECTROCARDIOGRAM REPORT: CPT | Mod: ,,, | Performed by: INTERNAL MEDICINE

## 2022-04-16 PROCEDURE — 80053 COMPREHEN METABOLIC PANEL: CPT

## 2022-04-16 PROCEDURE — 63700000 PHARM REV CODE 250 ALT 637 W/O HCPCS

## 2022-04-16 PROCEDURE — 27000221 HC OXYGEN, UP TO 24 HOURS

## 2022-04-16 PROCEDURE — 25000242 PHARM REV CODE 250 ALT 637 W/ HCPCS: Performed by: INTERNAL MEDICINE

## 2022-04-16 PROCEDURE — 94761 N-INVAS EAR/PLS OXIMETRY MLT: CPT

## 2022-04-16 PROCEDURE — 36415 COLL VENOUS BLD VENIPUNCTURE: CPT | Performed by: INTERNAL MEDICINE

## 2022-04-16 PROCEDURE — 85025 COMPLETE CBC W/AUTO DIFF WBC: CPT

## 2022-04-16 PROCEDURE — 93005 ELECTROCARDIOGRAM TRACING: CPT

## 2022-04-16 PROCEDURE — 94668 MNPJ CHEST WALL SBSQ: CPT

## 2022-04-16 PROCEDURE — 83735 ASSAY OF MAGNESIUM: CPT | Performed by: INTERNAL MEDICINE

## 2022-04-16 PROCEDURE — 25000003 PHARM REV CODE 250

## 2022-04-16 PROCEDURE — 93010 EKG 12-LEAD: ICD-10-PCS | Mod: ,,, | Performed by: INTERNAL MEDICINE

## 2022-04-16 PROCEDURE — 97110 THERAPEUTIC EXERCISES: CPT

## 2022-04-16 PROCEDURE — 97535 SELF CARE MNGMENT TRAINING: CPT

## 2022-04-16 PROCEDURE — 20600001 HC STEP DOWN PRIVATE ROOM

## 2022-04-16 RX ORDER — DILTIAZEM HYDROCHLORIDE 30 MG/1
30 TABLET, FILM COATED ORAL EVERY 6 HOURS
Status: DISCONTINUED | OUTPATIENT
Start: 2022-04-16 | End: 2022-04-18

## 2022-04-16 RX ORDER — DILTIAZEM HYDROCHLORIDE 30 MG/1
30 TABLET, FILM COATED ORAL EVERY 6 HOURS
Status: DISCONTINUED | OUTPATIENT
Start: 2022-04-16 | End: 2022-04-16

## 2022-04-16 RX ORDER — IPRATROPIUM BROMIDE AND ALBUTEROL SULFATE 2.5; .5 MG/3ML; MG/3ML
3 SOLUTION RESPIRATORY (INHALATION) EVERY 6 HOURS PRN
Status: DISCONTINUED | OUTPATIENT
Start: 2022-04-16 | End: 2022-04-22 | Stop reason: HOSPADM

## 2022-04-16 RX ADMIN — ISOSORBIDE MONONITRATE 30 MG: 30 TABLET, EXTENDED RELEASE ORAL at 08:04

## 2022-04-16 RX ADMIN — PREDNISONE 40 MG: 20 TABLET ORAL at 08:04

## 2022-04-16 RX ADMIN — PIPERACILLIN SODIUM AND TAZOBACTAM SODIUM 4.5 G: 4; .5 INJECTION, POWDER, FOR SOLUTION INTRAVENOUS at 05:04

## 2022-04-16 RX ADMIN — MUPIROCIN: 20 OINTMENT TOPICAL at 08:04

## 2022-04-16 RX ADMIN — PIPERACILLIN SODIUM AND TAZOBACTAM SODIUM 4.5 G: 4; .5 INJECTION, POWDER, FOR SOLUTION INTRAVENOUS at 08:04

## 2022-04-16 RX ADMIN — GABAPENTIN 100 MG: 100 CAPSULE ORAL at 08:04

## 2022-04-16 RX ADMIN — TIOTROPIUM BROMIDE INHALATION SPRAY 2 PUFF: 3.12 SPRAY, METERED RESPIRATORY (INHALATION) at 07:04

## 2022-04-16 RX ADMIN — Medication 1 CAPSULE: at 08:04

## 2022-04-16 RX ADMIN — PIPERACILLIN SODIUM AND TAZOBACTAM SODIUM 4.5 G: 4; .5 INJECTION, POWDER, FOR SOLUTION INTRAVENOUS at 12:04

## 2022-04-16 RX ADMIN — DILTIAZEM HYDROCHLORIDE 30 MG: 30 TABLET, FILM COATED ORAL at 05:04

## 2022-04-16 RX ADMIN — INSULIN ASPART 2 UNITS: 100 INJECTION, SOLUTION INTRAVENOUS; SUBCUTANEOUS at 12:04

## 2022-04-16 RX ADMIN — METOPROLOL SUCCINATE 50 MG: 50 TABLET, EXTENDED RELEASE ORAL at 08:04

## 2022-04-16 RX ADMIN — PANTOPRAZOLE SODIUM 40 MG: 40 TABLET, DELAYED RELEASE ORAL at 08:04

## 2022-04-16 RX ADMIN — ERGOCALCIFEROL 50000 UNITS: 1.25 CAPSULE ORAL at 08:04

## 2022-04-16 RX ADMIN — TICAGRELOR 90 MG: 90 TABLET ORAL at 08:04

## 2022-04-16 RX ADMIN — FUROSEMIDE 80 MG: 10 INJECTION, SOLUTION INTRAMUSCULAR; INTRAVENOUS at 01:04

## 2022-04-16 RX ADMIN — IPRATROPIUM BROMIDE AND ALBUTEROL SULFATE 3 ML: 2.5; .5 SOLUTION RESPIRATORY (INHALATION) at 07:04

## 2022-04-16 RX ADMIN — FLUCONAZOLE 400 MG: 200 TABLET ORAL at 08:04

## 2022-04-16 RX ADMIN — FUROSEMIDE 80 MG: 10 INJECTION, SOLUTION INTRAMUSCULAR; INTRAVENOUS at 11:04

## 2022-04-16 RX ADMIN — GUAIFENESIN 600 MG: 600 TABLET, EXTENDED RELEASE ORAL at 08:04

## 2022-04-16 RX ADMIN — NITROGLYCERIN 0.4 MG: 0.4 TABLET, ORALLY DISINTEGRATING SUBLINGUAL at 08:04

## 2022-04-16 RX ADMIN — ATORVASTATIN CALCIUM 80 MG: 20 TABLET, FILM COATED ORAL at 08:04

## 2022-04-16 RX ADMIN — FUROSEMIDE 80 MG: 10 INJECTION, SOLUTION INTRAMUSCULAR; INTRAVENOUS at 12:04

## 2022-04-16 RX ADMIN — ASPIRIN 81 MG: 81 TABLET, COATED ORAL at 08:04

## 2022-04-16 RX ADMIN — DILTIAZEM HYDROCHLORIDE 30 MG: 30 TABLET, FILM COATED ORAL at 11:04

## 2022-04-16 RX ADMIN — IPRATROPIUM BROMIDE AND ALBUTEROL SULFATE 3 ML: 2.5; .5 SOLUTION RESPIRATORY (INHALATION) at 01:04

## 2022-04-16 RX ADMIN — FLUTICASONE FUROATE AND VILANTEROL TRIFENATATE 1 PUFF: 100; 25 POWDER RESPIRATORY (INHALATION) at 07:04

## 2022-04-16 RX ADMIN — Medication 100 MG: at 08:04

## 2022-04-16 RX ADMIN — FERROUS SULFATE TAB 325 MG (65 MG ELEMENTAL FE) 1 EACH: 325 (65 FE) TAB at 08:04

## 2022-04-16 RX ADMIN — AZITHROMYCIN MONOHYDRATE 500 MG: 250 TABLET ORAL at 08:04

## 2022-04-16 NOTE — PT/OT/SLP PROGRESS
"Occupational Therapy   Treatment    Name: Marisol Kerr  MRN: [id number]  Admitting Diagnosis:  NSTEMI (non-ST elevated myocardial infarction)  2 Days Post-Op    Recommendations:     Discharge Recommendations: home health OT  Discharge Equipment Recommendations:  none  Barriers to discharge:  None    Assessment:     Marisol Kerr is a 76 y.o. female with a medical diagnosis of NSTEMI (non-ST elevated myocardial infarction).  She presents with the following performance deficits affecting function:  weakness, impaired endurance, impaired sensation, impaired self care skills, gait instability, impaired functional mobilty, decreased lower extremity function, decreased upper extremity function, decreased safety awareness, pain, decreased ROM, impaired skin, edema, impaired cardiopulmonary response to activity. Pt was agreeable to and participated in OT session.  Pt worked on ADLs, func mobility and UE therex during today's session.  Pt was noted to make progress towards her goals in therapy.  Pt's goals remain appropriate at this time.  She will continue to benefit from skilled OT services in order to assist her with increasing her safety and level of independence with self care and mobility tasks.       Rehab Prognosis:  Good; patient would benefit from acute skilled OT services to address these deficits and reach maximum level of function.       Plan:     Patient to be seen 3 x/week to address the above listed problems via self-care/home management, therapeutic activities, therapeutic exercises  · Plan of Care Expires: 04/29/22  · Plan of Care Reviewed with: patient    Subjective     Pain/Comfort:  · Pain Rating 1: 8/10  · Location - Side 1: Right  · Location 1: first toe  · Pain Addressed 1: Pre-medicate for activity, Reposition, Distraction, Cessation of Activity, Nurse notified  · Pain Rating Post-Intervention 1: 7/10    Objective:   "I just got back in bed, I don't want to get out again."  "Everytime I cough, I poo on " myself' - pt noted with watery/loose stools - nurse present and made aware of this     Communicated with: nurse prior to session.  Patient found left sidelying with telemetry, oxygen, pulse ox (continuous), peripheral IV, PureWick upon OT entry to room.    General Precautions: Standard, fall, aspiration   Orthopedic Precautions:N/A   Braces: N/A  Respiratory Status: Nasal cannula, flow 2 L/min     Occupational Performance:     Bed Mobility:    · Patient completed Rolling/Turning to Left with  stand by assistance and with side rail  · Patient completed Rolling/Turning to Right with minimum assistance and with side rail  · Patient completed Scooting/Bridging with minimum assistance  · Patient completed Supine to Sit - attempted but not completed - pt c/o too much pain in R lower leg     Functional Mobility/Transfers:  · N/A - pt declined sitting EOB or getting OOB - states she just returned to bed prior to OT arrival today      Activities of Daily Living:  · Feeding:  modified independence    · Grooming: set up A at bed level    · Toileting: moderate assistance pt incontinent of loose stools during session - needed Mod A with hygiene and donning disposable brief      AMPAC 6 Click ADL: 21    Treatment & Education:  · Pt completed 2 sets of 10 reps of B UE AROM exercises in order to work towards increasing her UB strength/endurance to assist with mobility and self care skills.  Pt completed the following exercises: shoulder flex/ext, forward punches and elbow flex/ext on R UE - only elbow exercises performed on L UE due to pain in L shoulder   Pt completed ADLs and func mobility activities for tx session as noted above  Pt educated on role of OT and POC  · Pt required increased time with UE therex and toileting      Patient left left sidelying with all lines intact, call button in reach and nurse notifiedEducation:      GOALS:   Multidisciplinary Problems     Occupational Therapy Goals        Problem: Occupational  Therapy    Goal Priority Disciplines Outcome Interventions   Occupational Therapy Goal     OT, PT/OT Ongoing, Progressing    Description: Goals to be met by: 5/12/22     Patient will increase functional independence with ADLs by performing:    UE Dressing with Set-up Assistance.  LE Dressing with Stand-by Assistance.  Grooming while standing at sink with Set-up Assistance.  Toileting from toilet with Modified Keith for hygiene and clothing management.   Supine to sit with Modified Keith.  Step transfer with Modified Keith  Toilet transfer to toilet with Modified Keith.                     Time Tracking:     OT Date of Treatment: 04/16/22  OT Start Time: 1104  OT Stop Time: 1153  OT Total Time (min): 49 min    Billable Minutes:Self Care/Home Management 15  Therapeutic Activity 19  Therapeutic Exercise 15    OT/LONA: OT          4/16/2022

## 2022-04-16 NOTE — NURSING
Alert and oriented x4. PERRLA intact. Vital signs stable. Report of left sided chest pain and midsternal chest pressure 8/10. Pt states she was lying in bed after her breathing treatment and the pain appeared suddenly. Pt has not yet eaten. Administered PRN nitroglycerin SL. Notified Dr. Ramires. Physician ordered STAT EKG. Patient's chest pain decreased to 6/10 with nitro. As time went by, pt's pain continued to subside and pt stated she felt better. Educated pt on meds being administered, s/s to report, and plan of care. Pt understood education. No other concerns at this time. Left pt sitting up in bed with call light and personal belongings within reach.

## 2022-04-16 NOTE — ASSESSMENT & PLAN NOTE
Normal EF 2/2022 and no s/s of exacerbation. TTE on 04/09 with EF 60, hypokinetic basal inferoseptal and inferior hypokinesis.  - will start IV lasix 80mg BID   - Daily weights, strict I/Os  - Not on MRA or SLGT2; can consider as outpt

## 2022-04-16 NOTE — ASSESSMENT & PLAN NOTE
- esophogram done 4/14 showed esophageal dysmotility characterized by diminished secondary waves and presence of tertiary contractions.   - possible that dysmotility is related to increased contractility vs spasm  - will start CCB

## 2022-04-16 NOTE — NURSING
Pt having frequent bowel movements. Pt states that she takes medication at home to prevent having diarrhea. Attempted to get in touch with the team to notify. No response at this time. Will continue to monitor.

## 2022-04-16 NOTE — SUBJECTIVE & OBJECTIVE
Interval History: Patient states cough improved overnight but continues to be persistent. Believe symptoms are related to acute on chronic diastolic heart failure w/ ISSA related to cardiorenal syndrome. Good UO overnight w/ diuresis. Will start CCB for esophageal dysmotility that is possibly related to increased esophageal contractility/spasm.     Review of Systems   Constitutional: Negative for chills, decreased appetite, diaphoresis, fever, malaise/fatigue and weight gain.   HENT: Negative.  Negative for congestion and sore throat.    Eyes:  Negative for blurred vision and double vision.   Cardiovascular:  Negative for chest pain, dyspnea on exertion, irregular heartbeat, leg swelling and palpitations.   Respiratory:  Positive for cough. Negative for hemoptysis, shortness of breath and wheezing.    Hematologic/Lymphatic: Positive for bleeding problem. Bruises/bleeds easily.   Skin: Negative.  Negative for itching and rash.   Musculoskeletal:  Positive for arthritis. Negative for falls and joint pain.   Gastrointestinal:  Positive for nausea and vomiting. Negative for bloating, abdominal pain, constipation, diarrhea and melena.   Genitourinary: Negative.  Negative for hematuria and urgency.   Neurological:  Negative for dizziness, headaches, light-headedness and numbness.   Psychiatric/Behavioral: Negative.  Negative for altered mental status and depression.    Objective:     Vital Signs (Most Recent):  Temp: 98.6 °F (37 °C) (04/16/22 0826)  Pulse: (!) 119 (04/16/22 0834)  Resp: 20 (04/16/22 0826)  BP: (!) 120/56 (04/16/22 0834)  SpO2: (!) 92 % (04/16/22 0826)   Vital Signs (24h Range):  Temp:  [97.9 °F (36.6 °C)-98.9 °F (37.2 °C)] 98.6 °F (37 °C)  Pulse:  [] 119  Resp:  [17-20] 20  SpO2:  [92 %-98 %] 92 %  BP: (112-154)/() 120/56     Weight: 78.7 kg (173 lb 6.3 oz)  Body mass index is 30.71 kg/m².     SpO2: (!) 92 %  O2 Device (Oxygen Therapy): nasal cannula      Intake/Output Summary (Last 24 hours)  at 4/16/2022 0946  Last data filed at 4/16/2022 0500  Gross per 24 hour   Intake 580 ml   Output 2500 ml   Net -1920 ml         Lines/Drains/Airways       Drain  Duration             Female External Urinary Catheter 03/27/22 2000 19 days              Peripheral Intravenous Line  Duration                  Peripheral IV - Single Lumen 04/15/22 1700 18 G Right Antecubital <1 day                    Physical Exam  Vitals and nursing note reviewed.   Constitutional:       Appearance: Normal appearance.   HENT:      Head: Normocephalic and atraumatic.      Nose: Nose normal.      Mouth/Throat:      Mouth: Mucous membranes are moist.      Pharynx: Oropharynx is clear.      Comments: Oozing blister on bottom lip   Eyes:      Extraocular Movements: Extraocular movements intact.   Cardiovascular:      Rate and Rhythm: Normal rate and regular rhythm.      Pulses: Normal pulses.      Heart sounds: Normal heart sounds.   Pulmonary:      Effort: Pulmonary effort is normal.      Breath sounds: Normal breath sounds. No wheezing or rales.   Abdominal:      General: Bowel sounds are normal.      Palpations: Abdomen is soft.      Tenderness: There is no abdominal tenderness.      Hernia: A hernia (R hernia abdominal hernia, reducable, non-painful) is present.   Musculoskeletal:         General: Normal range of motion.      Cervical back: Normal range of motion and neck supple.      Right lower leg: No edema.      Left lower leg: No edema.   Skin:     General: Skin is warm and dry.   Neurological:      General: No focal deficit present.      Mental Status: She is alert and oriented to person, place, and time.   Psychiatric:         Mood and Affect: Mood normal.         Behavior: Behavior normal.       Significant Labs: CMP   Recent Labs   Lab 04/16/22  0230      K 4.3      CO2 25   *   BUN 34*   CREATININE 1.7*   CALCIUM 8.7   PROT 5.6*   ALBUMIN 2.6*   BILITOT 0.4   ALKPHOS 69   AST 14   ALT 11   ANIONGAP 14    ESTGFRAFRICA 33.8*   EGFRNONAA 29.3*      and CBC   Recent Labs   Lab 04/16/22  0230   WBC 10.86   HGB 10.0*   HCT 32.5*            Significant Imaging: Reviewed

## 2022-04-16 NOTE — ASSESSMENT & PLAN NOTE
Likely Pre-renal in the setting of decreased PO intake. Will hold lisinopril at this time. Cr 1.3 on arrival with baseline 1.3-1.6.  - possibly related to cardiorenal syndrome in the setting of acute on chronic diastolic heart failure  - creatinine stable w/ diuresis, will contineu   - Avoid nephrotoxins  - renally dose medications  - Daily BMP to monitor electrolytes and Cr

## 2022-04-16 NOTE — PROGRESS NOTES
[facility]  Cardiology  Progress Note    Patient Name: Marisol Kerr  MRN: [id number]  Admission Date: 4/8/2022  Hospital Length of Stay: 8 days  Code Status: Full Code   Attending Physician: Kaycee Mitchell MD   Primary Care Physician: Khadar Dwyer MD  Expected Discharge Date: 4/18/2022  Principal Problem:NSTEMI (non-ST elevated myocardial infarction)    Subjective:     Hospital Course:   Patient was asymptomatic on DAPT and Heparin, however, heparin was discontinued on arrival in the setting of known history of rectus sheath hematoma which occurred as a post-C complication. Patient originally scheduled to undergo high risk PCI whilst inpatient w/ Dr. Chappell but as per interventional cardiology, this will now take place 4/17/2022 in the outpatient setting. Of note, patient has developed worsening cough w/ increased clear sputum production and pleuritic chest pain. She remains afebrile and overall non infectious appearing. Repeat CXR w/ increased RML and RLL infiltrates. Patient's cough has not improved w/ symptomatic treatment. Given her high risk of decompensation, we will initiate HAP treatment w/ Zosyn. Additionally, concern for microaspiration 2/2 ?oropharyngeal dysphasia vs globus sensation - evaluated by SLP and GI evaluation recommended. Esophogram done 4/14/22 shows esophageal dysmotility characterized by dimished secondary waves and presence of tertiary contractions. Cr 1.4 --> 2.0; likely pre-renal in the setting of decreased intake. Will administer IVFs PRN and continue to monitor. Now improving. Anti-hypertensives held in the setting of borderline hypotension.       Interval History: Patient states cough improved overnight but continues to be persistent. Believe symptoms are related to acute on chronic diastolic heart failure w/ ISSA related to cardiorenal syndrome. Good UO overnight w/ diuresis. Will start CCB for esophageal dysmotility that is possibly related to increased  esophageal contractility/spasm.     Review of Systems   Constitutional: Negative for chills, decreased appetite, diaphoresis, fever, malaise/fatigue and weight gain.   HENT: Negative.  Negative for congestion and sore throat.    Eyes:  Negative for blurred vision and double vision.   Cardiovascular:  Negative for chest pain, dyspnea on exertion, irregular heartbeat, leg swelling and palpitations.   Respiratory:  Positive for cough. Negative for hemoptysis, shortness of breath and wheezing.    Hematologic/Lymphatic: Positive for bleeding problem. Bruises/bleeds easily.   Skin: Negative.  Negative for itching and rash.   Musculoskeletal:  Positive for arthritis. Negative for falls and joint pain.   Gastrointestinal:  Positive for nausea and vomiting. Negative for bloating, abdominal pain, constipation, diarrhea and melena.   Genitourinary: Negative.  Negative for hematuria and urgency.   Neurological:  Negative for dizziness, headaches, light-headedness and numbness.   Psychiatric/Behavioral: Negative.  Negative for altered mental status and depression.    Objective:     Vital Signs (Most Recent):  Temp: 98.6 °F (37 °C) (04/16/22 0826)  Pulse: (!) 119 (04/16/22 0834)  Resp: 20 (04/16/22 0826)  BP: (!) 120/56 (04/16/22 0834)  SpO2: (!) 92 % (04/16/22 0826)   Vital Signs (24h Range):  Temp:  [97.9 °F (36.6 °C)-98.9 °F (37.2 °C)] 98.6 °F (37 °C)  Pulse:  [] 119  Resp:  [17-20] 20  SpO2:  [92 %-98 %] 92 %  BP: (112-154)/() 120/56     Weight: 78.7 kg (173 lb 6.3 oz)  Body mass index is 30.71 kg/m².     SpO2: (!) 92 %  O2 Device (Oxygen Therapy): nasal cannula      Intake/Output Summary (Last 24 hours) at 4/16/2022 0946  Last data filed at 4/16/2022 0500  Gross per 24 hour   Intake 580 ml   Output 2500 ml   Net -1920 ml         Lines/Drains/Airways       Drain  Duration             Female External Urinary Catheter 03/27/22 2000 19 days              Peripheral Intravenous Line  Duration                   Peripheral IV - Single Lumen 04/15/22 1700 18 G Right Antecubital <1 day                    Physical Exam  Vitals and nursing note reviewed.   Constitutional:       Appearance: Normal appearance.   HENT:      Head: Normocephalic and atraumatic.      Nose: Nose normal.      Mouth/Throat:      Mouth: Mucous membranes are moist.      Pharynx: Oropharynx is clear.      Comments: Oozing blister on bottom lip   Eyes:      Extraocular Movements: Extraocular movements intact.   Cardiovascular:      Rate and Rhythm: Normal rate and regular rhythm.      Pulses: Normal pulses.      Heart sounds: Normal heart sounds.   Pulmonary:      Effort: Pulmonary effort is normal.      Breath sounds: Normal breath sounds. No wheezing or rales.   Abdominal:      General: Bowel sounds are normal.      Palpations: Abdomen is soft.      Tenderness: There is no abdominal tenderness.      Hernia: A hernia (R hernia abdominal hernia, reducable, non-painful) is present.   Musculoskeletal:         General: Normal range of motion.      Cervical back: Normal range of motion and neck supple.      Right lower leg: No edema.      Left lower leg: No edema.   Skin:     General: Skin is warm and dry.   Neurological:      General: No focal deficit present.      Mental Status: She is alert and oriented to person, place, and time.   Psychiatric:         Mood and Affect: Mood normal.         Behavior: Behavior normal.       Significant Labs: CMP   Recent Labs   Lab 04/16/22  0230      K 4.3      CO2 25   *   BUN 34*   CREATININE 1.7*   CALCIUM 8.7   PROT 5.6*   ALBUMIN 2.6*   BILITOT 0.4   ALKPHOS 69   AST 14   ALT 11   ANIONGAP 14   ESTGFRAFRICA 33.8*   EGFRNONAA 29.3*      and CBC   Recent Labs   Lab 04/16/22  0230   WBC 10.86   HGB 10.0*   HCT 32.5*            Significant Imaging: Reviewed     Assessment and Plan:     * NSTEMI (non-ST elevated myocardial infarction)  Admitted with acute onset chest pressure, EKG with lateral ST  depressions, and troponin 0.06 (mildly elevated) all consistent with NSTEMI. Currently stable and asymptomatic. 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 Outpt plan to follow-up with Dr Chappell for high risk PCI. Troponin peaked at 0.07.     - On ASA/Brilinta. Heparin held from admission due to prior rectus sheath hematoma during last L heart cath.   - Continue atorvastatin, metoprolol  - SL Nitro PRN for chest pain  - patient will have outpatient high risk PCI tentatively scheduled for 4/17 w/ Dr. Chappell     Esophageal dysmotility  - esophogram done 4/14 showed esophageal dysmotility characterized by diminished secondary waves and presence of tertiary contractions.   - possible that dysmotility is related to increased contractility vs spasm  - will start CCB    HAP (hospital-acquired pneumonia)  Has persistent cough and increased sputum production. Not infectious appearing, afebrile and without leukocytosis. However, CXR w/ increased RLL and RML infiltrates. No improvement of symptoms w/ symptomatic treatment, thus will treat for HAP w/ Zosyn. Additionally, concerned for microaspiration given patient continues to regurgitate food - SLP consulted, appreciate recommendations. Consulted GI and SLP and will proceed with esophageal evaluation for dysphagia and globus sensation.   - Will complete a 5-7 day course of antibiotic therapy.       Normocytic anemia  Prior rectus sheath hematoma after angiogram 2/2022 requiring inferior epigastric artery embolization.   No s/s bleeding and on DAPT.     - Continue home ferrous sulfate  - Type and screen for potential procedure  - Trend CBC daily  - Transfuse to maintain Hb >8 with CAD    Chronic diastolic congestive heart failure  Normal EF 2/2022 and no s/s of exacerbation. TTE on 04/09 with EF 60, hypokinetic basal inferoseptal and inferior hypokinesis.  - will start IV lasix 80mg BID   - Daily weights, strict I/Os  - Not  on MRA or SLGT2; can consider as outpt        Chronic hypoxemic respiratory failure  On NC at home and underlying COPD. No s/s exacerbation.  - Continue home inhaler regimen  - Albuterol nebs PRN  - Supplemental oxygen      CKD (chronic kidney disease), stage III  Likely Pre-renal in the setting of decreased PO intake. Will hold lisinopril at this time. Cr 1.3 on arrival with baseline 1.3-1.6.  - possibly related to cardiorenal syndrome in the setting of acute on chronic diastolic heart failure  - creatinine stable w/ diuresis, will contineu   - Avoid nephrotoxins  - renally dose medications  - Daily BMP to monitor electrolytes and Cr    Gastroesophageal reflux disease without esophagitis  - Continue home PPI  - low suspicion for candidiasis, discontinue fluconazole.     Essential hypertension, benign  Continue medications as tolerated   Currently holding Lisinopril and Amlodipine for ISSA and hypotension, respectively.        VTE Risk Mitigation (From admission, onward)         Ordered     IP VTE HIGH RISK PATIENT  Once         04/08/22 2203     Place sequential compression device  Until discontinued         04/08/22 2203                Estrellita Salinas MD  Cardiology  [facility]

## 2022-04-17 PROBLEM — M10.9 GOUT: Status: ACTIVE | Noted: 2022-04-17

## 2022-04-17 LAB
ALBUMIN SERPL BCP-MCNC: 2.7 G/DL (ref 3.5–5.2)
ALP SERPL-CCNC: 65 U/L (ref 55–135)
ALT SERPL W/O P-5'-P-CCNC: 14 U/L (ref 10–44)
ANION GAP SERPL CALC-SCNC: 16 MMOL/L (ref 8–16)
AST SERPL-CCNC: 24 U/L (ref 10–40)
BASOPHILS # BLD AUTO: 0.04 K/UL (ref 0–0.2)
BASOPHILS NFR BLD: 0.2 % (ref 0–1.9)
BILIRUB SERPL-MCNC: 0.4 MG/DL (ref 0.1–1)
BUN SERPL-MCNC: 49 MG/DL (ref 8–23)
CALCIUM SERPL-MCNC: 9.1 MG/DL (ref 8.7–10.5)
CHLORIDE SERPL-SCNC: 96 MMOL/L (ref 95–110)
CO2 SERPL-SCNC: 27 MMOL/L (ref 23–29)
CREAT SERPL-MCNC: 2.1 MG/DL (ref 0.5–1.4)
DIFFERENTIAL METHOD: ABNORMAL
EOSINOPHIL # BLD AUTO: 0.1 K/UL (ref 0–0.5)
EOSINOPHIL NFR BLD: 0.3 % (ref 0–8)
ERYTHROCYTE [DISTWIDTH] IN BLOOD BY AUTOMATED COUNT: 13.2 % (ref 11.5–14.5)
EST. GFR  (AFRICAN AMERICAN): 26.2 ML/MIN/1.73 M^2
EST. GFR  (NON AFRICAN AMERICAN): 22.7 ML/MIN/1.73 M^2
GLUCOSE SERPL-MCNC: 129 MG/DL (ref 70–110)
HCT VFR BLD AUTO: 32.9 % (ref 37–48.5)
HGB BLD-MCNC: 10 G/DL (ref 12–16)
IMM GRANULOCYTES # BLD AUTO: 0.15 K/UL (ref 0–0.04)
IMM GRANULOCYTES NFR BLD AUTO: 0.9 % (ref 0–0.5)
LYMPHOCYTES # BLD AUTO: 0.7 K/UL (ref 1–4.8)
LYMPHOCYTES NFR BLD: 4.5 % (ref 18–48)
MAGNESIUM SERPL-MCNC: 1.6 MG/DL (ref 1.6–2.6)
MCH RBC QN AUTO: 28.2 PG (ref 27–31)
MCHC RBC AUTO-ENTMCNC: 30.4 G/DL (ref 32–36)
MCV RBC AUTO: 93 FL (ref 82–98)
MONOCYTES # BLD AUTO: 0.9 K/UL (ref 0.3–1)
MONOCYTES NFR BLD: 5.5 % (ref 4–15)
NEUTROPHILS # BLD AUTO: 14.5 K/UL (ref 1.8–7.7)
NEUTROPHILS NFR BLD: 88.6 % (ref 38–73)
NRBC BLD-RTO: 0 /100 WBC
PLATELET # BLD AUTO: 233 K/UL (ref 150–450)
PMV BLD AUTO: 11.7 FL (ref 9.2–12.9)
POCT GLUCOSE: 124 MG/DL (ref 70–110)
POCT GLUCOSE: 128 MG/DL (ref 70–110)
POCT GLUCOSE: 181 MG/DL (ref 70–110)
POCT GLUCOSE: 211 MG/DL (ref 70–110)
POTASSIUM SERPL-SCNC: 3.7 MMOL/L (ref 3.5–5.1)
PROT SERPL-MCNC: 6.4 G/DL (ref 6–8.4)
RBC # BLD AUTO: 3.54 M/UL (ref 4–5.4)
SODIUM SERPL-SCNC: 139 MMOL/L (ref 136–145)
WBC # BLD AUTO: 16.31 K/UL (ref 3.9–12.7)

## 2022-04-17 PROCEDURE — 63700000 PHARM REV CODE 250 ALT 637 W/O HCPCS

## 2022-04-17 PROCEDURE — 80053 COMPREHEN METABOLIC PANEL: CPT

## 2022-04-17 PROCEDURE — 25000003 PHARM REV CODE 250: Performed by: STUDENT IN AN ORGANIZED HEALTH CARE EDUCATION/TRAINING PROGRAM

## 2022-04-17 PROCEDURE — 25000003 PHARM REV CODE 250

## 2022-04-17 PROCEDURE — 27000221 HC OXYGEN, UP TO 24 HOURS

## 2022-04-17 PROCEDURE — 99231 PR SUBSEQUENT HOSPITAL CARE,LEVL I: ICD-10-PCS | Mod: GC,,, | Performed by: INTERNAL MEDICINE

## 2022-04-17 PROCEDURE — 85025 COMPLETE CBC W/AUTO DIFF WBC: CPT

## 2022-04-17 PROCEDURE — 63600175 PHARM REV CODE 636 W HCPCS

## 2022-04-17 PROCEDURE — 25000003 PHARM REV CODE 250: Performed by: INTERNAL MEDICINE

## 2022-04-17 PROCEDURE — 36415 COLL VENOUS BLD VENIPUNCTURE: CPT | Performed by: INTERNAL MEDICINE

## 2022-04-17 PROCEDURE — 94640 AIRWAY INHALATION TREATMENT: CPT

## 2022-04-17 PROCEDURE — 20600001 HC STEP DOWN PRIVATE ROOM

## 2022-04-17 PROCEDURE — 27000207 HC ISOLATION

## 2022-04-17 PROCEDURE — 83735 ASSAY OF MAGNESIUM: CPT | Performed by: INTERNAL MEDICINE

## 2022-04-17 PROCEDURE — 99900035 HC TECH TIME PER 15 MIN (STAT)

## 2022-04-17 PROCEDURE — 94761 N-INVAS EAR/PLS OXIMETRY MLT: CPT

## 2022-04-17 PROCEDURE — 99231 SBSQ HOSP IP/OBS SF/LOW 25: CPT | Mod: GC,,, | Performed by: INTERNAL MEDICINE

## 2022-04-17 RX ORDER — LOPERAMIDE HYDROCHLORIDE 2 MG/1
2 CAPSULE ORAL 4 TIMES DAILY PRN
Status: DISCONTINUED | OUTPATIENT
Start: 2022-04-17 | End: 2022-04-17

## 2022-04-17 RX ADMIN — BENZONATATE 100 MG: 100 CAPSULE, LIQUID FILLED ORAL at 06:04

## 2022-04-17 RX ADMIN — Medication 100 MG: at 06:04

## 2022-04-17 RX ADMIN — PIPERACILLIN SODIUM AND TAZOBACTAM SODIUM 4.5 G: 4; .5 INJECTION, POWDER, FOR SOLUTION INTRAVENOUS at 05:04

## 2022-04-17 RX ADMIN — ISOSORBIDE MONONITRATE 30 MG: 30 TABLET, EXTENDED RELEASE ORAL at 08:04

## 2022-04-17 RX ADMIN — DILTIAZEM HYDROCHLORIDE 30 MG: 30 TABLET, FILM COATED ORAL at 06:04

## 2022-04-17 RX ADMIN — TICAGRELOR 90 MG: 90 TABLET ORAL at 08:04

## 2022-04-17 RX ADMIN — METOPROLOL SUCCINATE 50 MG: 50 TABLET, EXTENDED RELEASE ORAL at 08:04

## 2022-04-17 RX ADMIN — PIPERACILLIN SODIUM AND TAZOBACTAM SODIUM 4.5 G: 4; .5 INJECTION, POWDER, FOR SOLUTION INTRAVENOUS at 08:04

## 2022-04-17 RX ADMIN — PREDNISONE 40 MG: 20 TABLET ORAL at 08:04

## 2022-04-17 RX ADMIN — LOPERAMIDE HYDROCHLORIDE 2 MG: 2 CAPSULE ORAL at 09:04

## 2022-04-17 RX ADMIN — MUPIROCIN: 20 OINTMENT TOPICAL at 08:04

## 2022-04-17 RX ADMIN — PANTOPRAZOLE SODIUM 40 MG: 40 TABLET, DELAYED RELEASE ORAL at 08:04

## 2022-04-17 RX ADMIN — PIPERACILLIN SODIUM AND TAZOBACTAM SODIUM 4.5 G: 4; .5 INJECTION, POWDER, FOR SOLUTION INTRAVENOUS at 12:04

## 2022-04-17 RX ADMIN — ASPIRIN 81 MG: 81 TABLET, COATED ORAL at 06:04

## 2022-04-17 RX ADMIN — FLUTICASONE FUROATE AND VILANTEROL TRIFENATATE 1 PUFF: 100; 25 POWDER RESPIRATORY (INHALATION) at 08:04

## 2022-04-17 RX ADMIN — DILTIAZEM HYDROCHLORIDE 30 MG: 30 TABLET, FILM COATED ORAL at 12:04

## 2022-04-17 RX ADMIN — GUAIFENESIN 600 MG: 600 TABLET, EXTENDED RELEASE ORAL at 08:04

## 2022-04-17 RX ADMIN — DILTIAZEM HYDROCHLORIDE 30 MG: 30 TABLET, FILM COATED ORAL at 05:04

## 2022-04-17 RX ADMIN — ATORVASTATIN CALCIUM 80 MG: 20 TABLET, FILM COATED ORAL at 08:04

## 2022-04-17 RX ADMIN — BENZONATATE 100 MG: 100 CAPSULE, LIQUID FILLED ORAL at 08:04

## 2022-04-17 RX ADMIN — TIOTROPIUM BROMIDE INHALATION SPRAY 2 PUFF: 3.12 SPRAY, METERED RESPIRATORY (INHALATION) at 08:04

## 2022-04-17 RX ADMIN — AZITHROMYCIN MONOHYDRATE 500 MG: 250 TABLET ORAL at 08:04

## 2022-04-17 RX ADMIN — INSULIN ASPART 1 UNITS: 100 INJECTION, SOLUTION INTRAVENOUS; SUBCUTANEOUS at 08:04

## 2022-04-17 RX ADMIN — GABAPENTIN 100 MG: 100 CAPSULE ORAL at 08:04

## 2022-04-17 NOTE — PROGRESS NOTES
[facility]  Cardiology  Progress Note    Patient Name: Marisol Kerr  MRN: [id number]  Admission Date: 4/8/2022  Hospital Length of Stay: 9 days  Code Status: Full Code   Attending Physician: Kaycee Mitchell MD   Primary Care Physician: Khadar Dwyer MD  Expected Discharge Date: 4/18/2022  Principal Problem:NSTEMI (non-ST elevated myocardial infarction)    Subjective:     Hospital Course:   Patient was asymptomatic on DAPT and Heparin, however, heparin was discontinued on arrival in the setting of known history of rectus sheath hematoma which occurred as a post-C complication. Patient originally scheduled to undergo high risk PCI whilst inpatient w/ Dr. Chappell but as per interventional cardiology, this will now take place 4/17/2022 in the outpatient setting. Of note, patient has developed worsening cough w/ increased clear sputum production and pleuritic chest pain. She remains afebrile and overall non infectious appearing. Repeat CXR w/ increased RML and RLL infiltrates. Patient's cough has not improved w/ symptomatic treatment. Given her high risk of decompensation, we will initiate HAP treatment w/ Zosyn. Additionally, concern for microaspiration 2/2 ?oropharyngeal dysphasia vs globus sensation - evaluated by SLP and GI evaluation recommended. Esophogram done 4/14/22 shows esophageal dysmotility characterized by dimished secondary waves and presence of tertiary contractions. Cr 1.4 --> 2.0; likely pre-renal in the setting of decreased intake. Will administer IVFs PRN and continue to monitor. Now improving. Anti-hypertensives held in the setting of borderline hypotension.       Interval History: Patient w/ diarrhea and cough overnight. Will order C diff studies given extended hospital stay and recent antibiotic use. Leukocytosis in the setting of prednisone use. Worsening Cr but high suspicion for worsening cardiorenal disease. Will continue diuresis.     Review of Systems    Constitutional: Negative for chills, decreased appetite, diaphoresis, fever, malaise/fatigue and weight gain.   HENT: Negative.  Negative for congestion and sore throat.    Eyes:  Negative for blurred vision and double vision.   Cardiovascular:  Negative for chest pain, dyspnea on exertion, irregular heartbeat, leg swelling and palpitations.   Respiratory:  Positive for cough. Negative for hemoptysis, shortness of breath and wheezing.    Hematologic/Lymphatic: Positive for bleeding problem. Bruises/bleeds easily.   Skin: Negative.  Negative for itching and rash.   Musculoskeletal:  Positive for arthritis. Negative for falls and joint pain.   Gastrointestinal:  Positive for nausea and vomiting. Negative for bloating, abdominal pain, constipation, diarrhea and melena.   Genitourinary: Negative.  Negative for hematuria and urgency.   Neurological:  Negative for dizziness, headaches, light-headedness and numbness.   Psychiatric/Behavioral: Negative.  Negative for altered mental status and depression.    Objective:     Vital Signs (Most Recent):  Temp: 98.4 °F (36.9 °C) (04/17/22 0730)  Pulse: 64 (04/17/22 0846)  Resp: 18 (04/17/22 0832)  BP: (!) 157/70 (04/17/22 0846)  SpO2: 97 % (04/17/22 0833)   Vital Signs (24h Range):  Temp:  [97.9 °F (36.6 °C)-98.7 °F (37.1 °C)] 98.4 °F (36.9 °C)  Pulse:  [56-89] 64  Resp:  [18-20] 18  SpO2:  [92 %-98 %] 97 %  BP: (106-157)/(51-70) 157/70     Weight: 78.7 kg (173 lb 6.3 oz)  Body mass index is 30.71 kg/m².     SpO2: 97 %  O2 Device (Oxygen Therapy): nasal cannula      Intake/Output Summary (Last 24 hours) at 4/17/2022 1030  Last data filed at 4/17/2022 0600  Gross per 24 hour   Intake 1340 ml   Output 1450 ml   Net -110 ml         Lines/Drains/Airways       Drain  Duration             Female External Urinary Catheter 03/27/22 2000 20 days              Peripheral Intravenous Line  Duration                  Peripheral IV - Single Lumen 04/17/22 0200 20 G Anterior;Left;Proximal  Forearm <1 day                    Physical Exam  Vitals and nursing note reviewed.   Constitutional:       Appearance: Normal appearance.   HENT:      Head: Normocephalic and atraumatic.      Nose: Nose normal.      Mouth/Throat:      Mouth: Mucous membranes are moist.      Pharynx: Oropharynx is clear.      Comments: Oozing blister on bottom lip   Eyes:      Extraocular Movements: Extraocular movements intact.   Cardiovascular:      Rate and Rhythm: Normal rate and regular rhythm.      Pulses: Normal pulses.      Heart sounds: Normal heart sounds.   Pulmonary:      Effort: Pulmonary effort is normal.      Breath sounds: Normal breath sounds. No wheezing or rales.   Abdominal:      General: Bowel sounds are normal.      Palpations: Abdomen is soft.      Tenderness: There is no abdominal tenderness.      Hernia: A hernia (R hernia abdominal hernia, reducable, non-painful) is present.   Musculoskeletal:         General: Normal range of motion.      Cervical back: Normal range of motion and neck supple.      Right lower leg: No edema.      Left lower leg: No edema.   Skin:     General: Skin is warm and dry.   Neurological:      General: No focal deficit present.      Mental Status: She is alert and oriented to person, place, and time.   Psychiatric:         Mood and Affect: Mood normal.         Behavior: Behavior normal.       Significant Labs: CMP   Recent Labs   Lab 04/16/22  0230 04/17/22  0334    139   K 4.3 3.7    96   CO2 25 27   * 129*   BUN 34* 49*   CREATININE 1.7* 2.1*   CALCIUM 8.7 9.1   PROT 5.6* 6.4   ALBUMIN 2.6* 2.7*   BILITOT 0.4 0.4   ALKPHOS 69 65   AST 14 24   ALT 11 14   ANIONGAP 14 16   ESTGFRAFRICA 33.8* 26.2*   EGFRNONAA 29.3* 22.7*      and CBC   Recent Labs   Lab 04/16/22  0230 04/17/22  0334   WBC 10.86 16.31*   HGB 10.0* 10.0*   HCT 32.5* 32.9*    233         Significant Imaging: Reviewed     Assessment and Plan:     * NSTEMI (non-ST elevated myocardial  infarction)  Admitted with acute onset chest pressure, EKG with lateral ST depressions, and troponin 0.06 (mildly elevated) all consistent with NSTEMI. Currently stable and asymptomatic. 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 Outpt plan to follow-up with Dr Chappell for high risk PCI. Troponin peaked at 0.07.     - On ASA/Brilinta. Heparin held from admission due to prior rectus sheath hematoma during last L heart cath.   - Continue atorvastatin, metoprolol  - SL Nitro PRN for chest pain  - patient will have outpatient high risk PCI tentatively scheduled for 4/17 w/ Dr. Chappell     Gout  - L great toe pain clinically consistent w/ gout. Will start 5 day prednisone 40mg course given patient's poor kidney function.     Esophageal dysmotility  - esophogram done 4/14 showed esophageal dysmotility characterized by diminished secondary waves and presence of tertiary contractions.   - possible that dysmotility is related to increased contractility vs spasm  - will start CCB    HAP (hospital-acquired pneumonia)  Has persistent cough and increased sputum production. Not infectious appearing, afebrile and without leukocytosis. However, CXR w/ increased RLL and RML infiltrates. No improvement of symptoms w/ symptomatic treatment, thus will treat for HAP w/ Zosyn. Additionally, concerned for microaspiration given patient continues to regurgitate food - SLP consulted, appreciate recommendations. Consulted GI and SLP and will proceed with esophageal evaluation for dysphagia and globus sensation.   - Will complete a 5-7 day course of antibiotic therapy.       Normocytic anemia  Prior rectus sheath hematoma after angiogram 2/2022 requiring inferior epigastric artery embolization.   No s/s bleeding and on DAPT.     - Continue home ferrous sulfate  - Type and screen for potential procedure  - Trend CBC daily  - Transfuse to maintain Hb >8 with CAD    Leukocytosis  - leukocytosis  4/17: patient does not appear to be infectious. Does endorse 5-7 episodes of watery diarrhea/day x 2 days. Will collect C diff studies in setting of prolonged hospital stay and antibiotic use. However, leukocytosis can also be 2/2 prednisone use.     Chronic diastolic congestive heart failure  Normal EF 2/2022 and no s/s of exacerbation. TTE on 04/09 with EF 60, hypokinetic basal inferoseptal and inferior hypokinesis.  - will start IV lasix 80mg BID. Worsening Cr likely related to cardiorenal disease  - Daily weights, strict I/Os  - Not on MRA or SLGT2; can consider as outpt        Chronic hypoxemic respiratory failure  On NC at home and underlying COPD. No s/s exacerbation.  - Continue home inhaler regimen  - Albuterol nebs PRN  - Supplemental oxygen      CKD (chronic kidney disease), stage III  Likely Pre-renal in the setting of decreased PO intake. Will hold lisinopril at this time. Cr 1.3 on arrival with baseline 1.3-1.6.  - possibly related to cardiorenal syndrome in the setting of acute on chronic diastolic heart failure  - creatinine stable w/ diuresis, will contineu   - Avoid nephrotoxins  - renally dose medications  - Daily BMP to monitor electrolytes and Cr    Gastroesophageal reflux disease without esophagitis  - Continue home PPI  - low suspicion for candidiasis, discontinue fluconazole.     Essential hypertension, benign  Continue medications as tolerated   Currently holding Lisinopril and Amlodipine for ISSA and hypotension, respectively.        VTE Risk Mitigation (From admission, onward)         Ordered     IP VTE HIGH RISK PATIENT  Once         04/08/22 2203     Place sequential compression device  Until discontinued         04/08/22 2203                Estrellita Salinas MD  Cardiology  [facility]

## 2022-04-17 NOTE — ASSESSMENT & PLAN NOTE
- leukocytosis 4/17: patient does not appear to be infectious. Does endorse 5-7 episodes of watery diarrhea/day x 2 days. Will collect C diff studies in setting of prolonged hospital stay and antibiotic use. However, leukocytosis can also be 2/2 prednisone use.

## 2022-04-17 NOTE — ASSESSMENT & PLAN NOTE
- L great toe pain clinically consistent w/ gout. Will start 5 day prednisone 40mg course given patient's poor kidney function.

## 2022-04-17 NOTE — ASSESSMENT & PLAN NOTE
Normal EF 2/2022 and no s/s of exacerbation. TTE on 04/09 with EF 60, hypokinetic basal inferoseptal and inferior hypokinesis.  - will start IV lasix 80mg BID. Worsening Cr likely related to cardiorenal disease  - Daily weights, strict I/Os  - Not on MRA or SLGT2; can consider as outpt

## 2022-04-17 NOTE — NURSING
Pt alert and oriented x4. PERRLA intact. Vital signs stable on 3L NC. No report of chest pain. However, pt is experiencing frequent bowel movements. Pt had around 4 BMs on day shift and around 4 BMs on night shift, according to the night shift RN. Also, pt's urine has become cloudy overnight. Notified Dr. Salinas. C diff sample was ordered. Sample not collected, per charge nurse, because stool was not a liquid consistency or smell that correlated to C diff. Notified Dr. Salinas about not collecting sample. Pt states she usually takes medication at home for frequent BMs. Educated pt on meds being administered and s/s to report. Pt understood education. No other problems or concerns at this time. Left pt sitting up in bed with call light and personal belongings within reach.

## 2022-04-17 NOTE — PLAN OF CARE
Patient has frequent cough overnight. Multiple episodes of small loose green colored bowel movements.

## 2022-04-17 NOTE — SUBJECTIVE & OBJECTIVE
Interval History: Patient w/ diarrhea and cough overnight. Will order C diff studies given extended hospital stay and recent antibiotic use. Leukocytosis in the setting of prednisone use. Worsening Cr but high suspicion for worsening cardiorenal disease. Will continue diuresis.     Review of Systems   Constitutional: Negative for chills, decreased appetite, diaphoresis, fever, malaise/fatigue and weight gain.   HENT: Negative.  Negative for congestion and sore throat.    Eyes:  Negative for blurred vision and double vision.   Cardiovascular:  Negative for chest pain, dyspnea on exertion, irregular heartbeat, leg swelling and palpitations.   Respiratory:  Positive for cough. Negative for hemoptysis, shortness of breath and wheezing.    Hematologic/Lymphatic: Positive for bleeding problem. Bruises/bleeds easily.   Skin: Negative.  Negative for itching and rash.   Musculoskeletal:  Positive for arthritis. Negative for falls and joint pain.   Gastrointestinal:  Positive for nausea and vomiting. Negative for bloating, abdominal pain, constipation, diarrhea and melena.   Genitourinary: Negative.  Negative for hematuria and urgency.   Neurological:  Negative for dizziness, headaches, light-headedness and numbness.   Psychiatric/Behavioral: Negative.  Negative for altered mental status and depression.    Objective:     Vital Signs (Most Recent):  Temp: 98.4 °F (36.9 °C) (04/17/22 0730)  Pulse: 64 (04/17/22 0846)  Resp: 18 (04/17/22 0832)  BP: (!) 157/70 (04/17/22 0846)  SpO2: 97 % (04/17/22 0833)   Vital Signs (24h Range):  Temp:  [97.9 °F (36.6 °C)-98.7 °F (37.1 °C)] 98.4 °F (36.9 °C)  Pulse:  [56-89] 64  Resp:  [18-20] 18  SpO2:  [92 %-98 %] 97 %  BP: (106-157)/(51-70) 157/70     Weight: 78.7 kg (173 lb 6.3 oz)  Body mass index is 30.71 kg/m².     SpO2: 97 %  O2 Device (Oxygen Therapy): nasal cannula      Intake/Output Summary (Last 24 hours) at 4/17/2022 1030  Last data filed at 4/17/2022 0600  Gross per 24 hour   Intake  1340 ml   Output 1450 ml   Net -110 ml         Lines/Drains/Airways       Drain  Duration             Female External Urinary Catheter 03/27/22 2000 20 days              Peripheral Intravenous Line  Duration                  Peripheral IV - Single Lumen 04/17/22 0200 20 G Anterior;Left;Proximal Forearm <1 day                    Physical Exam  Vitals and nursing note reviewed.   Constitutional:       Appearance: Normal appearance.   HENT:      Head: Normocephalic and atraumatic.      Nose: Nose normal.      Mouth/Throat:      Mouth: Mucous membranes are moist.      Pharynx: Oropharynx is clear.      Comments: Oozing blister on bottom lip   Eyes:      Extraocular Movements: Extraocular movements intact.   Cardiovascular:      Rate and Rhythm: Normal rate and regular rhythm.      Pulses: Normal pulses.      Heart sounds: Normal heart sounds.   Pulmonary:      Effort: Pulmonary effort is normal.      Breath sounds: Normal breath sounds. No wheezing or rales.   Abdominal:      General: Bowel sounds are normal.      Palpations: Abdomen is soft.      Tenderness: There is no abdominal tenderness.      Hernia: A hernia (R hernia abdominal hernia, reducable, non-painful) is present.   Musculoskeletal:         General: Normal range of motion.      Cervical back: Normal range of motion and neck supple.      Right lower leg: No edema.      Left lower leg: No edema.   Skin:     General: Skin is warm and dry.   Neurological:      General: No focal deficit present.      Mental Status: She is alert and oriented to person, place, and time.   Psychiatric:         Mood and Affect: Mood normal.         Behavior: Behavior normal.       Significant Labs: CMP   Recent Labs   Lab 04/16/22  0230 04/17/22  0334    139   K 4.3 3.7    96   CO2 25 27   * 129*   BUN 34* 49*   CREATININE 1.7* 2.1*   CALCIUM 8.7 9.1   PROT 5.6* 6.4   ALBUMIN 2.6* 2.7*   BILITOT 0.4 0.4   ALKPHOS 69 65   AST 14 24   ALT 11 14   ANIONGAP 14 16    ESTGFRAFRICA 33.8* 26.2*   EGFRNONAA 29.3* 22.7*      and CBC   Recent Labs   Lab 04/16/22  0230 04/17/22  0334   WBC 10.86 16.31*   HGB 10.0* 10.0*   HCT 32.5* 32.9*    233         Significant Imaging: Reviewed

## 2022-04-18 LAB
ALBUMIN SERPL BCP-MCNC: 2.6 G/DL (ref 3.5–5.2)
ALP SERPL-CCNC: 63 U/L (ref 55–135)
ALT SERPL W/O P-5'-P-CCNC: 13 U/L (ref 10–44)
ANION GAP SERPL CALC-SCNC: 13 MMOL/L (ref 8–16)
ANISOCYTOSIS BLD QL SMEAR: SLIGHT
AST SERPL-CCNC: 22 U/L (ref 10–40)
BASOPHILS # BLD AUTO: 0.04 K/UL (ref 0–0.2)
BASOPHILS NFR BLD: 0.3 % (ref 0–1.9)
BILIRUB SERPL-MCNC: 0.4 MG/DL (ref 0.1–1)
BUN SERPL-MCNC: 52 MG/DL (ref 8–23)
CALCIUM SERPL-MCNC: 8.9 MG/DL (ref 8.7–10.5)
CHLORIDE SERPL-SCNC: 99 MMOL/L (ref 95–110)
CO2 SERPL-SCNC: 28 MMOL/L (ref 23–29)
CREAT SERPL-MCNC: 2.3 MG/DL (ref 0.5–1.4)
DIFFERENTIAL METHOD: ABNORMAL
EOSINOPHIL # BLD AUTO: 0.1 K/UL (ref 0–0.5)
EOSINOPHIL NFR BLD: 0.6 % (ref 0–8)
ERYTHROCYTE [DISTWIDTH] IN BLOOD BY AUTOMATED COUNT: 13.2 % (ref 11.5–14.5)
EST. GFR  (AFRICAN AMERICAN): 23.4 ML/MIN/1.73 M^2
EST. GFR  (NON AFRICAN AMERICAN): 20.3 ML/MIN/1.73 M^2
GLUCOSE SERPL-MCNC: 141 MG/DL (ref 70–110)
HCT VFR BLD AUTO: 31.7 % (ref 37–48.5)
HGB BLD-MCNC: 10.2 G/DL (ref 12–16)
IMM GRANULOCYTES # BLD AUTO: 0.1 K/UL (ref 0–0.04)
IMM GRANULOCYTES NFR BLD AUTO: 0.8 % (ref 0–0.5)
LYMPHOCYTES # BLD AUTO: 0.7 K/UL (ref 1–4.8)
LYMPHOCYTES NFR BLD: 5.6 % (ref 18–48)
MAGNESIUM SERPL-MCNC: 1.8 MG/DL (ref 1.6–2.6)
MCH RBC QN AUTO: 28.7 PG (ref 27–31)
MCHC RBC AUTO-ENTMCNC: 32.2 G/DL (ref 32–36)
MCV RBC AUTO: 89 FL (ref 82–98)
MONOCYTES # BLD AUTO: 0.6 K/UL (ref 0.3–1)
MONOCYTES NFR BLD: 5 % (ref 4–15)
NEUTROPHILS # BLD AUTO: 10.4 K/UL (ref 1.8–7.7)
NEUTROPHILS NFR BLD: 87.7 % (ref 38–73)
NRBC BLD-RTO: 0 /100 WBC
PLATELET # BLD AUTO: 199 K/UL (ref 150–450)
PLATELET BLD QL SMEAR: ABNORMAL
PMV BLD AUTO: 11.6 FL (ref 9.2–12.9)
POIKILOCYTOSIS BLD QL SMEAR: SLIGHT
POTASSIUM SERPL-SCNC: 3.7 MMOL/L (ref 3.5–5.1)
PROT SERPL-MCNC: 6.3 G/DL (ref 6–8.4)
RBC # BLD AUTO: 3.55 M/UL (ref 4–5.4)
SODIUM SERPL-SCNC: 140 MMOL/L (ref 136–145)
WBC # BLD AUTO: 11.91 K/UL (ref 3.9–12.7)

## 2022-04-18 PROCEDURE — 20600001 HC STEP DOWN PRIVATE ROOM

## 2022-04-18 PROCEDURE — 99232 SBSQ HOSP IP/OBS MODERATE 35: CPT | Mod: ,,, | Performed by: FAMILY MEDICINE

## 2022-04-18 PROCEDURE — 83735 ASSAY OF MAGNESIUM: CPT | Performed by: INTERNAL MEDICINE

## 2022-04-18 PROCEDURE — 99232 PR SUBSEQUENT HOSPITAL CARE,LEVL II: ICD-10-PCS | Mod: ,,, | Performed by: FAMILY MEDICINE

## 2022-04-18 PROCEDURE — 36415 COLL VENOUS BLD VENIPUNCTURE: CPT | Performed by: INTERNAL MEDICINE

## 2022-04-18 PROCEDURE — 25000003 PHARM REV CODE 250

## 2022-04-18 PROCEDURE — 99231 PR SUBSEQUENT HOSPITAL CARE,LEVL I: ICD-10-PCS | Mod: GC,,, | Performed by: INTERNAL MEDICINE

## 2022-04-18 PROCEDURE — 25000003 PHARM REV CODE 250: Performed by: INTERNAL MEDICINE

## 2022-04-18 PROCEDURE — 99900035 HC TECH TIME PER 15 MIN (STAT)

## 2022-04-18 PROCEDURE — 63700000 PHARM REV CODE 250 ALT 637 W/O HCPCS

## 2022-04-18 PROCEDURE — 97530 THERAPEUTIC ACTIVITIES: CPT

## 2022-04-18 PROCEDURE — 99233 PR SUBSEQUENT HOSPITAL CARE,LEVL III: ICD-10-PCS | Mod: GC,,, | Performed by: INTERNAL MEDICINE

## 2022-04-18 PROCEDURE — 85025 COMPLETE CBC W/AUTO DIFF WBC: CPT

## 2022-04-18 PROCEDURE — 94761 N-INVAS EAR/PLS OXIMETRY MLT: CPT

## 2022-04-18 PROCEDURE — 63600175 PHARM REV CODE 636 W HCPCS

## 2022-04-18 PROCEDURE — 99231 SBSQ HOSP IP/OBS SF/LOW 25: CPT | Mod: GC,,, | Performed by: INTERNAL MEDICINE

## 2022-04-18 PROCEDURE — 99233 SBSQ HOSP IP/OBS HIGH 50: CPT | Mod: GC,,, | Performed by: INTERNAL MEDICINE

## 2022-04-18 PROCEDURE — 27000221 HC OXYGEN, UP TO 24 HOURS

## 2022-04-18 PROCEDURE — 80053 COMPREHEN METABOLIC PANEL: CPT

## 2022-04-18 RX ORDER — LOPERAMIDE HYDROCHLORIDE 2 MG/1
2 CAPSULE ORAL 4 TIMES DAILY PRN
Status: DISCONTINUED | OUTPATIENT
Start: 2022-04-18 | End: 2022-04-22 | Stop reason: HOSPADM

## 2022-04-18 RX ORDER — CHOLESTYRAMINE 4 G/9G
1 POWDER, FOR SUSPENSION ORAL 2 TIMES DAILY
Status: DISCONTINUED | OUTPATIENT
Start: 2022-04-18 | End: 2022-04-19

## 2022-04-18 RX ORDER — DILTIAZEM HYDROCHLORIDE 120 MG/1
120 CAPSULE, COATED, EXTENDED RELEASE ORAL DAILY
Status: DISCONTINUED | OUTPATIENT
Start: 2022-04-18 | End: 2022-04-22 | Stop reason: HOSPADM

## 2022-04-18 RX ORDER — TORSEMIDE 20 MG/1
40 TABLET ORAL DAILY
Status: DISCONTINUED | OUTPATIENT
Start: 2022-04-18 | End: 2022-04-20

## 2022-04-18 RX ADMIN — FLUTICASONE FUROATE AND VILANTEROL TRIFENATATE 1 PUFF: 100; 25 POWDER RESPIRATORY (INHALATION) at 08:04

## 2022-04-18 RX ADMIN — ISOSORBIDE MONONITRATE 30 MG: 30 TABLET, EXTENDED RELEASE ORAL at 08:04

## 2022-04-18 RX ADMIN — PANTOPRAZOLE SODIUM 40 MG: 40 TABLET, DELAYED RELEASE ORAL at 08:04

## 2022-04-18 RX ADMIN — TICAGRELOR 90 MG: 90 TABLET ORAL at 08:04

## 2022-04-18 RX ADMIN — METOPROLOL SUCCINATE 50 MG: 50 TABLET, EXTENDED RELEASE ORAL at 08:04

## 2022-04-18 RX ADMIN — TIOTROPIUM BROMIDE INHALATION SPRAY 2 PUFF: 3.12 SPRAY, METERED RESPIRATORY (INHALATION) at 08:04

## 2022-04-18 RX ADMIN — CHOLESTYRAMINE 4 G: 4 POWDER, FOR SUSPENSION ORAL at 04:04

## 2022-04-18 RX ADMIN — MUPIROCIN: 20 OINTMENT TOPICAL at 08:04

## 2022-04-18 RX ADMIN — ATORVASTATIN CALCIUM 80 MG: 20 TABLET, FILM COATED ORAL at 08:04

## 2022-04-18 RX ADMIN — GUAIFENESIN 600 MG: 600 TABLET, EXTENDED RELEASE ORAL at 08:04

## 2022-04-18 RX ADMIN — PIPERACILLIN SODIUM AND TAZOBACTAM SODIUM 4.5 G: 4; .5 INJECTION, POWDER, FOR SOLUTION INTRAVENOUS at 12:04

## 2022-04-18 RX ADMIN — PREDNISONE 40 MG: 20 TABLET ORAL at 08:04

## 2022-04-18 RX ADMIN — DILTIAZEM HYDROCHLORIDE 30 MG: 30 TABLET, FILM COATED ORAL at 12:04

## 2022-04-18 RX ADMIN — TORSEMIDE 40 MG: 20 TABLET ORAL at 10:04

## 2022-04-18 RX ADMIN — GABAPENTIN 100 MG: 100 CAPSULE ORAL at 08:04

## 2022-04-18 RX ADMIN — PIPERACILLIN SODIUM AND TAZOBACTAM SODIUM 4.5 G: 4; .5 INJECTION, POWDER, FOR SOLUTION INTRAVENOUS at 08:04

## 2022-04-18 RX ADMIN — Medication 100 MG: at 06:04

## 2022-04-18 RX ADMIN — DILTIAZEM HYDROCHLORIDE 120 MG: 120 CAPSULE, COATED, EXTENDED RELEASE ORAL at 08:04

## 2022-04-18 RX ADMIN — DILTIAZEM HYDROCHLORIDE 30 MG: 30 TABLET, FILM COATED ORAL at 06:04

## 2022-04-18 RX ADMIN — ASPIRIN 81 MG: 81 TABLET, COATED ORAL at 06:04

## 2022-04-18 RX ADMIN — PIPERACILLIN SODIUM AND TAZOBACTAM SODIUM 4.5 G: 4; .5 INJECTION, POWDER, FOR SOLUTION INTRAVENOUS at 05:04

## 2022-04-18 RX ADMIN — AZITHROMYCIN MONOHYDRATE 500 MG: 250 TABLET ORAL at 08:04

## 2022-04-18 NOTE — ASSESSMENT & PLAN NOTE
Likely Pre-renal in the setting of decreased PO intake. Will hold lisinopril at this time. Cr 1.3 on arrival with baseline 1.3-1.6.  - creatinine continues to worsen, possibly related to cardiorenal syndrome in the setting of acute on chronic diastolic heart failure  - Avoid nephrotoxins  - renally dose medications  - Daily BMP to monitor electrolytes and Cr

## 2022-04-18 NOTE — PROGRESS NOTES
[facility]  Cardiology  Progress Note    Patient Name: Marisol Kerr  MRN: [id number]  Admission Date: 4/8/2022  Hospital Length of Stay: 10 days  Code Status: Full Code   Attending Physician: Kaycee Mitchell MD   Primary Care Physician: Khadar Dwyer MD  Expected Discharge Date: 4/18/2022  Principal Problem:NSTEMI (non-ST elevated myocardial infarction)    Subjective:     Hospital Course:   Patient was asymptomatic on DAPT and Heparin, however, heparin was discontinued on arrival in the setting of known history of rectus sheath hematoma which occurred spontaneously s/p C. Patient originally scheduled to undergo high risk PCI whilst inpatient w/ Dr. Chappell but as per interventional cardiology, this will now take place 4/17/2022 in the outpatient setting. Of note, patient has developed worsening cough w/ increased clear sputum production and pleuritic chest pain. She remains afebrile and overall non infectious appearing. Repeat CXR w/ increased RML and RLL infiltrates. Patient's cough has not improved w/ symptomatic treatment. Given her high risk of decompensation, we will initiate HAP treatment w/ Zosyn. Additionally, concern for microaspiration 2/2 ?oropharyngeal dysphasia vs globus sensation - evaluated by SLP and GI evaluation recommended. Esophogram done 4/14/22 shows esophageal dysmotility characterized by dimished secondary waves and presence of tertiary contractions. Cr 1.4 --> 2.0; likely pre-renal in the setting of decreased intake. Will administer IVFs PRN and continue to monitor. Now improving. Anti-hypertensives held in the setting of borderline hypotension.       Interval History: Patient continues to have diarrhea; does not appear to be C. Diff. Will consider restarting cholestyramine and loperamide PRN for symptomatic control. Worsening Cr in the setting of cardiorenal syndrome - CXR w/ persistent pulmonary edema. However, symptomatically, patient's fluid status is improving.  Will continue current management.     Review of Systems   Constitutional: Negative for chills, decreased appetite, diaphoresis, fever, malaise/fatigue and weight gain.   HENT: Negative.  Negative for congestion and sore throat.    Eyes:  Negative for blurred vision and double vision.   Cardiovascular:  Negative for chest pain, dyspnea on exertion, irregular heartbeat, leg swelling and palpitations.   Respiratory:  Positive for cough. Negative for hemoptysis, shortness of breath and wheezing.    Hematologic/Lymphatic: Positive for bleeding problem. Bruises/bleeds easily.   Skin: Negative.  Negative for itching and rash.   Musculoskeletal:  Positive for arthritis. Negative for falls and joint pain.   Gastrointestinal:  Positive for nausea and vomiting. Negative for bloating, abdominal pain, constipation, diarrhea and melena.   Genitourinary: Negative.  Negative for hematuria and urgency.   Neurological:  Negative for dizziness, headaches, light-headedness and numbness.   Psychiatric/Behavioral: Negative.  Negative for altered mental status and depression.    Objective:     Vital Signs (Most Recent):  Temp: 98.1 °F (36.7 °C) (04/18/22 0708)  Pulse: 86 (04/18/22 0826)  Resp: 18 (04/18/22 0826)  BP: (!) 154/67 (04/18/22 0708)  SpO2: (!) 93 % (04/18/22 0858)   Vital Signs (24h Range):  Temp:  [96.8 °F (36 °C)-98.8 °F (37.1 °C)] 98.1 °F (36.7 °C)  Pulse:  [54-86] 86  Resp:  [16-22] 18  SpO2:  [93 %-100 %] 93 %  BP: (124-165)/(54-71) 154/67     Weight: 78.7 kg (173 lb 6.3 oz)  Body mass index is 30.71 kg/m².     SpO2: (!) 93 %  O2 Device (Oxygen Therapy): nasal cannula      Intake/Output Summary (Last 24 hours) at 4/18/2022 1001  Last data filed at 4/18/2022 0600  Gross per 24 hour   Intake 940 ml   Output 900 ml   Net 40 ml       Lines/Drains/Airways       Drain  Duration             Female External Urinary Catheter 03/27/22 2000 21 days              Peripheral Intravenous Line  Duration                  Peripheral IV -  Single Lumen 04/17/22 0200 20 G Anterior;Left;Proximal Forearm 1 day                    Physical Exam  Vitals and nursing note reviewed.   Constitutional:       Appearance: Normal appearance.   HENT:      Head: Normocephalic and atraumatic.      Nose: Nose normal.      Mouth/Throat:      Mouth: Mucous membranes are moist.      Pharynx: Oropharynx is clear.      Comments: Oozing blister on bottom lip   Eyes:      Extraocular Movements: Extraocular movements intact.   Cardiovascular:      Rate and Rhythm: Normal rate and regular rhythm.      Pulses: Normal pulses.      Heart sounds: Normal heart sounds.   Pulmonary:      Effort: Pulmonary effort is normal.      Breath sounds: Normal breath sounds. No wheezing or rales.   Abdominal:      General: Bowel sounds are normal.      Palpations: Abdomen is soft.      Tenderness: There is no abdominal tenderness.      Hernia: A hernia (R hernia abdominal hernia, reducable, non-painful) is present.   Musculoskeletal:         General: Normal range of motion.      Cervical back: Normal range of motion and neck supple.      Right lower leg: No edema.      Left lower leg: No edema.   Skin:     General: Skin is warm and dry.   Neurological:      General: No focal deficit present.      Mental Status: She is alert and oriented to person, place, and time.   Psychiatric:         Mood and Affect: Mood normal.         Behavior: Behavior normal.       Significant Labs: CMP   Recent Labs   Lab 04/17/22  0334 04/18/22  0236    140   K 3.7 3.7   CL 96 99   CO2 27 28   * 141*   BUN 49* 52*   CREATININE 2.1* 2.3*   CALCIUM 9.1 8.9   PROT 6.4 6.3   ALBUMIN 2.7* 2.6*   BILITOT 0.4 0.4   ALKPHOS 65 63   AST 24 22   ALT 14 13   ANIONGAP 16 13   ESTGFRAFRICA 26.2* 23.4*   EGFRNONAA 22.7* 20.3*    and CBC   Recent Labs   Lab 04/17/22  0334 04/18/22  0236   WBC 16.31* 11.91   HGB 10.0* 10.2*   HCT 32.9* 31.7*    199       Significant Imaging: Echocardiogram: Transthoracic echo (TTE)  complete (Cupid Only):   Results for orders placed or performed during the hospital encounter of 04/08/22   Echo   Result Value Ref Range    LVIDd 3.37 (A) 3.5 - 6.0 cm    LVIDs 2.36 2.1 - 4.0 cm    LV Diastolic Volume 46.49 mL    LV Systolic Volume 19.23 mL    FS 30 %    LV Systolic Volume Index 10.6 mL/m2    LV Diastolic Volume Index 25.54 mL/m2    BSA 1.87 m2    EF 60 %    Narrative    · Limited study for wall morgan. several off axis views.  · The estimated ejection fraction is 60%.  · The left ventricle is normal in size with normal systolic function.  · The following segments are hypokinetic: basal inferoseptal and basal   inferior. All other segments are normal.  · Normal right ventricular size with normal right ventricular systolic   function.        Assessment and Plan:     * NSTEMI (non-ST elevated myocardial infarction)  Admitted with acute onset chest pressure, EKG with lateral ST depressions, and troponin 0.06 (mildly elevated) all consistent with NSTEMI. Currently stable and asymptomatic. 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 Outpt plan to follow-up with Dr Chappell for high risk PCI. Troponin peaked at 0.07.     - On ASA/Brilinta. Heparin held from admission due to prior rectus sheath hematoma during last L heart cath.   - Continue atorvastatin, metoprolol  - SL Nitro PRN for chest pain  - patient will have outpatient high risk PCI tentatively scheduled for 4/17 w/ Dr. Chappell     Gout  - L great toe pain clinically consistent w/ gout. Will start 5 day prednisone 40mg course given patient's poor kidney function.     Esophageal dysmotility  - esophogram done 4/14 showed esophageal dysmotility characterized by diminished secondary waves and presence of tertiary contractions.   - possible that dysmotility is related to increased contractility vs spasm  - will start CCB    HAP (hospital-acquired pneumonia)  Has persistent cough and increased sputum  production. Not infectious appearing, afebrile and without leukocytosis. However, CXR w/ increased RLL and RML infiltrates. No improvement of symptoms w/ symptomatic treatment, thus will treat for HAP w/ Zosyn. Additionally, concerned for microaspiration given patient continues to regurgitate food - SLP consulted, appreciate recommendations. Consulted GI and SLP and will proceed with esophageal evaluation for dysphagia and globus sensation.   - Will complete a 5-7 day course of antibiotic therapy.       Normocytic anemia  Prior rectus sheath hematoma occurring spontaneously after angiogram 2/2022 requiring inferior epigastric artery embolization.   No s/s bleeding and on DAPT.     - Continue home ferrous sulfate  - Type and screen for potential procedure  - Trend CBC daily  - Transfuse to maintain Hb >8 with CAD    Leukocytosis  - leukocytosis 4/17: patient does not appear to be infectious. Does endorse 5-7 episodes of watery diarrhea/day x 2 days. Will collect C diff studies in setting of prolonged hospital stay and antibiotic use. However, leukocytosis can also be 2/2 prednisone use.     Chronic diastolic congestive heart failure  Normal EF 2/2022 and no s/s of exacerbation. TTE on 04/09 with EF 60, hypokinetic basal inferoseptal and inferior hypokinesis.  - will start IV lasix 80mg BID. Worsening Cr likely related to cardiorenal disease. Lasix transitioned to PO torsemide 40mg QD  - Daily weights, strict I/Os  - Not on MRA or SLGT2; can consider as outpt        Chronic hypoxemic respiratory failure  On NC at home and underlying COPD. No s/s exacerbation.  - Continue home inhaler regimen  - Albuterol nebs PRN  - Supplemental oxygen      CKD (chronic kidney disease), stage III  Likely Pre-renal in the setting of decreased PO intake. Will hold lisinopril at this time. Cr 1.3 on arrival with baseline 1.3-1.6.  - creatinine continues to worsen, possibly related to cardiorenal syndrome in the setting of acute on  chronic diastolic heart failure  - Avoid nephrotoxins  - renally dose medications  - Daily BMP to monitor electrolytes and Cr    Gastroesophageal reflux disease without esophagitis  - Continue home PPI  - low suspicion for candidiasis, discontinue fluconazole.     Essential hypertension, benign  Continue medications as tolerated   Currently holding Lisinopril and Amlodipine for ISSA and hypotension, respectively.        VTE Risk Mitigation (From admission, onward)         Ordered     IP VTE HIGH RISK PATIENT  Once         04/08/22 2203     Place sequential compression device  Until discontinued         04/08/22 2203                Estrellita Salinas MD  Cardiology  [facility]

## 2022-04-18 NOTE — PLAN OF CARE
[facility]  Discharge Reassessment    Primary Care Provider: Khadar Dwyer MD    Expected Discharge Date: 4/18/2022    Reassessment (most recent)     Discharge Reassessment - 04/18/22 1416        Discharge Reassessment    Assessment Type Discharge Planning Reassessment     Did the patient's condition or plan change since previous assessment? No     Discharge Plan discussed with: Adult children   by treatment team    Discharge Plan A Home Health     Discharge Plan B Rehab     Discharge Barriers Identified None     Why the patient remains in the hospital Requires continued medical care             SW informed by Dr Mitchell and CCU resident Dr Salinas that pt's daughter is inquiring about pt going to rehab prior to going back home.  SW explained that therapy recs are currently for rehab but that therapy is scheduled to see pt again today and that PMR has also been consulted.  Awaiting additional input from therapy and PMR prior to determining discharge disposition.  Will continue to follow.    Ronda Esquivel LMSW  [facility]  [id number]

## 2022-04-18 NOTE — ASSESSMENT & PLAN NOTE
HFpEF LVEF 60%     Interpretation Summary  · Limited study for wall morgan. several off axis views.  · The estimated ejection fraction is 60%.  · The left ventricle is normal in size with normal systolic function.  · The following segments are hypokinetic: basal inferoseptal and basal inferior. All other segments are normal.  · Normal right ventricular size with normal right ventricular systolic function.  Last BNP reviewed- and noted below   Recent Labs   Lab 04/15/22  1012   *     Management as per primary

## 2022-04-18 NOTE — ASSESSMENT & PLAN NOTE
- patient w/ temporarily worsening Cr thought to be pre-renal in etiology. Baseline sCr ~1.3. However, patient appears volume overloaded on examination; endorses cough, orthopnea and questionable PND.CXR consistent w/ pulmonary edema. , likely falsely lowered in the setting of obesity. High suspicion for cardiorenal syndrome, thus patient diuresed w/ IV lasix. Symptoms improved.  - IV lasix transitioned to torsemide 40mg QD and appears to be getting dryer  - Hold torsemide for now, continue to trend renal fxn  - Avoid nephrotoxins  - renally dose medications  - Daily BMP to monitor electrolytes and Cr

## 2022-04-18 NOTE — PLAN OF CARE
Patient remains to have multiple bowel movements throughout the night. Received CHG bath. Has been NPO since midnight.

## 2022-04-18 NOTE — ASSESSMENT & PLAN NOTE
Patient is a 76 year old female with risk factors for CAD, known CAD from angiogram 02/10/2022 that presented with episode of chest discomfort on 04/07/2022 for which patient was admitted to [facility]. Transferred to [facility] on 04/08/2022 during the evening. Chest pain free since transition to [facility]. ECG did not show significant ST-T changes. Trop peaked at 0.072 at [place]. Loaded with aspirin and Brilinta. Patient was planned to undergo outpatient  of the distal RCA.   Transitioned to [facility] for possible ; however, due to elevation in Cr with concerns for risk for dialysis as well as morbidity from the procedure, the patient and daughter have opted to pursue medical management of symptoms at this time, which is reasonable.   - Continue medical management with: aspirin 81 mg po daily + metoprolol succinate 50 + isosorbide mononitrate 30 + atorvastatin 80 mg PO HS  - Appreciate primary team assistance in care coordination with daughter Marisol Kerr

## 2022-04-18 NOTE — ASSESSMENT & PLAN NOTE
Prior spontaneous rectus sheath hematoma after angiogram 2/2022 requiring inferior epigastric artery embolization.   No s/s bleeding and on DAPT.     - Continue home ferrous sulfate  - Type and screen for potential procedure  - Trend CBC daily  - Transfuse to maintain Hb >8 with CAD   Private car

## 2022-04-18 NOTE — ASSESSMENT & PLAN NOTE
- TTE on 04/09 with EF 60%, hypokinetic basal inferoseptal and inferior hypokinesis.  - IV lasix was converted to torsemide. Likely overdiuresed. Holding torsemide for now  - Daily weights, strict I/Os  - Start SLGT2 at discharge

## 2022-04-18 NOTE — PROGRESS NOTES
[facility]  Gastroenterology  Progress Note    Patient Name: Marisol Kerr  MRN: [id number]  Admission Date: 4/8/2022  Hospital Length of Stay: 10 days  Code Status: Full Code   Attending Provider: Kaycee Mitchell MD  Consulting Provider: Karo Wiley DNP  Primary Care Physician: Khadar Dwyer MD  Principal Problem: NSTEMI (non-ST elevated myocardial infarction)      Subjective:   GI initially consulted on 4/12 for dysphagia.    Interval History: Not a candidate for EGD in light of cardiac issues ongoing at this time.  Today, sitting up in bedside recliner for visit.  Reports improvement in cough, however still present.  Her family member at bedside agrees.  She does report improvement in tolerance of foods last night (is NPO this AM for procedure?), however she was not aware of the chopped food recommendations and this made food unappealing to her.    Review of Systems   HENT:  Positive for trouble swallowing (improving).    Respiratory:  Positive for cough (improving).    Gastrointestinal:  Negative for abdominal pain.   Objective:     Vital Signs (Most Recent):  Temp: 98.1 °F (36.7 °C) (04/18/22 0708)  Pulse: 86 (04/18/22 0826)  Resp: 18 (04/18/22 0826)  BP: (!) 154/67 (04/18/22 0708)  SpO2: (!) 93 % (04/18/22 0858)   Vital Signs (24h Range):  Temp:  [96.8 °F (36 °C)-98.8 °F (37.1 °C)] 98.1 °F (36.7 °C)  Pulse:  [54-86] 86  Resp:  [16-22] 18  SpO2:  [93 %-100 %] 93 %  BP: (131-165)/(62-71) 154/67     Weight: 78.7 kg (173 lb 6.3 oz) (04/10/22 0737)  Body mass index is 30.71 kg/m².      Intake/Output Summary (Last 24 hours) at 4/18/2022 1443  Last data filed at 4/18/2022 0600  Gross per 24 hour   Intake 820 ml   Output 900 ml   Net -80 ml       Lines/Drains/Airways       Drain  Duration             Female External Urinary Catheter 03/27/22 2000 21 days              Peripheral Intravenous Line  Duration                  Peripheral IV - Single Lumen 04/17/22 0200 20 G  Anterior;Left;Proximal Forearm 1 day                    Physical Exam  Vitals reviewed.   Constitutional:       General: She is not in acute distress.     Appearance: She is obese.   HENT:      Head: Normocephalic and atraumatic.   Eyes:      Extraocular Movements: Extraocular movements intact.   Pulmonary:      Effort: Pulmonary effort is normal. No respiratory distress.      Comments: Nasal cannula in place  Skin:     General: Skin is warm and dry.      Capillary Refill: Capillary refill takes less than 2 seconds.   Neurological:      Mental Status: She is alert. Mental status is at baseline.   Psychiatric:         Mood and Affect: Mood normal.         Behavior: Behavior normal.         Thought Content: Thought content normal.       Significant Labs:  CBC:   Recent Labs   Lab 04/17/22  0334 04/18/22  0236   WBC 16.31* 11.91   HGB 10.0* 10.2*   HCT 32.9* 31.7*    199     CMP:   Recent Labs   Lab 04/18/22 0236   *   CALCIUM 8.9   ALBUMIN 2.6*   PROT 6.3      K 3.7   CO2 28   CL 99   BUN 52*   CREATININE 2.3*   ALKPHOS 63   ALT 13   AST 22   BILITOT 0.4     Coagulation: No results for input(s): PT, INR, APTT in the last 48 hours.  All pertinent lab results from the last 24 hours have been reviewed.      Significant Imaging:  Imaging results within the past 24 hours have been reviewed.  Esophagram with esophageal dysmotility - no evidence of mass or stricture. Exam limited due to positioning.     Assessment/Plan:     Esophageal dysmotility  Marisol Kerr is a 76 y.o. female with history of DM, HTN, HLD, CKD, who was admitted for chest pain from rehab. GI consulted for dysphagia.     The patient developed dysphagia over the past 2-3 days, associated with cough, nausea and vomiting.  It is unlikely that she has mechanical obstruction that developed over this short of a time, unfortunately she is not a good candidate at this time for undergoing EGD requiring anesthesia.  Would recommend further  evaluation of cough per primary team.  She might also benefit from antiemetics.    4/18: reports cough has improved.  Reports she was able to swallow dinner last night but it wasn't appealing since it was all chopped.  Denies abd pain.  Esophagram 4/14 noting esophageal dysmotility.  Has been started on Diltiazem 120 mg to see if this is beneficial.     Recommendations:  - not a good candidate for undergoing EGD with anesthesia at this time due to cardiac co-morbidities  - antiemetics PRN  - safe eating habits:      1. Eat upright at 90 deg and remain upright at least 30 min -1 hour post meal.     2. Chew food thoroughly     3.  One bite at a time     4. Sips of water in between bites     5. Limit other distractions during meals (avoid talking, turning, etc).  - f/u Speech and dietitian recommendations   - cough workup per primary team  - please notify GI with any acute changes in patient's clinical status        Thank you for your consult. I will follow-up with patient. Please contact us if you have any additional questions.    Karo Wiley DNP  Gastroenterology  [facility]

## 2022-04-18 NOTE — ASSESSMENT & PLAN NOTE
On NC at home and underlying COPD. No s/s exacerbation.  - Continue home inhaler regimen  - Albuterol nebs PRN  - Supplemental oxygen w/ goal SpO2 88-92%

## 2022-04-18 NOTE — SUBJECTIVE & OBJECTIVE
Interval History: Patient was seen this AM, Cr elevated at 2.3. Hgb stable. Feels bad because she didn't sleep well from persistent cough. WBC count normalized.    Objective:     Vital Signs (Most Recent):  Temp: 97.4 °F (36.3 °C) (04/18/22 1519)  Pulse: (!) 57 (04/18/22 1519)  Resp: 17 (04/18/22 1519)  BP: (!) 107/51 (04/18/22 1519)  SpO2: 97 % (04/18/22 1519)   Vital Signs (24h Range):  Temp:  [96.8 °F (36 °C)-98.8 °F (37.1 °C)] 97.4 °F (36.3 °C)  Pulse:  [55-86] 57  Resp:  [16-22] 17  SpO2:  [93 %-100 %] 97 %  BP: (107-165)/(51-71) 107/51     Weight: 78.7 kg (173 lb 6.3 oz)  Body mass index is 30.71 kg/m².    SpO2: 97 %  O2 Device (Oxygen Therapy): nasal cannula      Intake/Output Summary (Last 24 hours) at 4/18/2022 1625  Last data filed at 4/18/2022 1400  Gross per 24 hour   Intake 1120 ml   Output 900 ml   Net 220 ml       Lines/Drains/Airways       Drain  Duration             Female External Urinary Catheter 03/27/22 2000 21 days              Peripheral Intravenous Line  Duration                  Peripheral IV - Single Lumen 04/17/22 0200 20 G Anterior;Left;Proximal Forearm 1 day         Peripheral IV - Single Lumen 04/18/22 1349 20 G;1 3/4 in Right Forearm <1 day                    Physical Exam  Vitals reviewed.   Constitutional:       General: She is not in acute distress.     Appearance: Normal appearance. She is obese. She is not toxic-appearing or diaphoretic.   HENT:      Head: Normocephalic and atraumatic.      Ears:      Comments: Hard of hearing, hearing aids in place     Mouth/Throat:      Mouth: Mucous membranes are moist.   Cardiovascular:      Rate and Rhythm: Normal rate and regular rhythm.      Heart sounds: No murmur heard.    No friction rub. No gallop.   Pulmonary:      Effort: Pulmonary effort is normal. No respiratory distress.      Breath sounds: Normal breath sounds.      Comments: Chronic NC use  Abdominal:      Palpations: Abdomen is soft.   Musculoskeletal:      Right lower leg: No  edema.      Left lower leg: No edema.   Skin:     General: Skin is warm.   Neurological:      Mental Status: She is alert and oriented to person, place, and time. Mental status is at baseline.     Significant Labs: BMP:   Recent Labs   Lab 04/17/22  0334 04/18/22  0236   * 141*    140   K 3.7 3.7   CL 96 99   CO2 27 28   BUN 49* 52*   CREATININE 2.1* 2.3*   CALCIUM 9.1 8.9   MG 1.6 1.8   , CMP   Recent Labs   Lab 04/17/22  0334 04/18/22  0236    140   K 3.7 3.7   CL 96 99   CO2 27 28   * 141*   BUN 49* 52*   CREATININE 2.1* 2.3*   CALCIUM 9.1 8.9   PROT 6.4 6.3   ALBUMIN 2.7* 2.6*   BILITOT 0.4 0.4   ALKPHOS 65 63   AST 24 22   ALT 14 13   ANIONGAP 16 13   ESTGFRAFRICA 26.2* 23.4*   EGFRNONAA 22.7* 20.3*   , CBC   Recent Labs   Lab 04/17/22  0334 04/18/22  0236   WBC 16.31* 11.91   HGB 10.0* 10.2*   HCT 32.9* 31.7*    199   , INR No results for input(s): INR, PROTIME in the last 48 hours., Lipid Panel No results for input(s): CHOL, HDL, LDLCALC, TRIG, CHOLHDL in the last 48 hours., and Troponin No results for input(s): TROPONINI in the last 48 hours.    Significant Imaging: Echocardiogram: 2D echo with color flow doppler: No results found for this or any previous visit. and Transthoracic echo (TTE) complete (Cupid Only):   Results for orders placed or performed during the hospital encounter of 04/08/22   Echo   Result Value Ref Range    LVIDd 3.37 (A) 3.5 - 6.0 cm    LVIDs 2.36 2.1 - 4.0 cm    LV Diastolic Volume 46.49 mL    LV Systolic Volume 19.23 mL    FS 30 %    LV Systolic Volume Index 10.6 mL/m2    LV Diastolic Volume Index 25.54 mL/m2    BSA 1.87 m2    EF 60 %    Narrative    · Limited study for wall morgan. several off axis views.  · The estimated ejection fraction is 60%.  · The left ventricle is normal in size with normal systolic function.  · The following segments are hypokinetic: basal inferoseptal and basal   inferior. All other segments are normal.  · Normal right  ventricular size with normal right ventricular systolic   function.

## 2022-04-18 NOTE — PLAN OF CARE
Recommendations  1. When medically feasible recommend renal/diabetic diet-texture per SLP   2. Modify ONS Boost Plus to Boost Glucose Control TID- Per pt request no strawberry flavor   3. Add Novasource Renal 2x/day if PO intake remains <50%  3. RD following     Goals: Meet % EEN, EPN by RD f/u date  Nutrition Goal Status: goal not met  Communication of RD Recs: other (POC)       By Luz Maria COLIN

## 2022-04-18 NOTE — CONSULTS
"  [facility]  Adult Nutrition  Consult Note    SUMMARY     Recommendations  1. When medically feasible recommend renal/diabetic diet-texture per SLP   2. Modify ONS Boost Plus to Boost Glucose Control TID- Per pt request no strawberry flavor   3. Add Novasource Renal 1x/day if PO intake remains <50%  3. RD following    Goals: Meet % EEN, EPN by RD f/u date  Nutrition Goal Status: goal not met  Communication of RD Recs: other (POC)    Assessment and Plan    Nutrition Problem  Inadequate energy intake    Related to (etiology):   Inability to consume sufficient energy     Signs and Symptoms (as evidenced by):   ~25% meal consumption    Interventions/Recommendations (treatment strategy):  Collaboration of nutrition care w/ other providers     Nutrition Diagnosis Status:   New        Reason for Assessment    Reason For Assessment: consult  Diagnosis: cardiac disease  Relevant Medical History: COPD, obesity, DM, HTN, CAD, CHF, cancer, DM type 2  Interdisciplinary Rounds: did not attend  General Information Comments: RD consulted for bpa. Spoke w/ pt at bedside, reports a poor appetite at this time and PTA. UBW unknown. Per RN documentation pt is consuming 25% meal consumption. Reports she likes consuming ONS Boost at this time. No issues chewing/swallowing at this time. No n/v/d/c. NFPE completed 4/18/22 age related wasting identified no s/s of malnutrition  at this time.  Nutrition Discharge Planning: pending medical course    Nutrition Risk Screen    Nutrition Risk Screen: no indicators present    Nutrition/Diet History    Spiritual, Cultural Beliefs, Mu-ism Practices, Values that Affect Care: no    Anthropometrics    Temp: 98.1 °F (36.7 °C)  Height Method: Stated  Height: 5' 3" (160 cm)  Height (inches): 63 in  Weight Method: Standard Scale  Weight: 78.7 kg (173 lb 6.3 oz)  Weight (lb): 173.39 lb  Ideal Body Weight (IBW), Female: 115 lb  % Ideal Body Weight, Female (lb): 151.3 %  BMI " (Calculated): 30.7       Lab/Procedures/Meds    Pertinent Labs Reviewed: reviewed  Pertinent Labs Comments: BUN 52, Cr 2.3, GFR 20.3, Glucose 141  Pertinent Medications Reviewed: reviewed  Pertinent Medications Comments: ergocalciferol, Q10    Estimated/Assessed Needs    Weight Used For Calorie Calculations: 78.7 kg (173 lb 8 oz)  Energy Calorie Requirements (kcal): 1570  Energy Need Method: Lipscomb-St Jeor  Protein Requirements: 94 g (1.2 g/kg)  Weight Used For Protein Calculations: 78.7 kg (173 lb 8 oz)  Fluid Requirements (mL): 1 mL or fluid per MD  RDA Method (mL): 1570  CHO Requirement: 196      Nutrition Prescription Ordered    Current Diet Order: dysphagia mechanical soft    Evaluation of Received Nutrient/Fluid Intake    I/O: -8.2 L since admit  Energy Calories Required: not meeting needs  Protein Required: not meeting needs  Fluid Required: not meeting needs  Total Fluid Intake (mL/kg): 1 mL or fluid per MD  Tolerance: tolerating  % Intake of Estimated Energy Needs: 25%   Meal Intake: 25%     Nutrition Risk    Level of Risk/Frequency of Follow-up: low       Monitor and Evaluation    Food and Nutrient Intake: energy intake, food and beverage intake  Food and Nutrient Adminstration: diet order  Knowledge/Beliefs/Attitudes: food and nutrition knowledge/skill, beliefs and attitudes  Physical Activity and Function: nutrition-related ADLs and IADLs, factors affecting access to physical activity  Anthropometric Measurements: height/length, weight, weight change, body mass index, growth pattern indices/percentile ranks  Biochemical Data, Medical Tests and Procedures: electrolyte and renal panel, gastrointestinal profile, glucose/endocrine profile, lipid profile, inflammatory profile  Nutrition-Focused Physical Findings: overall appearance, extremities, muscles and bones, head and eyes, skin       Nutrition Follow-Up    RD Follow-up?: Yes     By Luz Maria COLIN

## 2022-04-18 NOTE — HPI
Marisol Kerr is a 76 y.o. female with history of DM, HTN, HLD, CKD, who was admitted for chest pain from rehab.  She was recently admitted in February for rectus sheath hematoma as a complication of left heart catheterization, underwent IR embolization.  At the nursing home she developed chest pain for which she was admitted to the hospital.  She is planned to undergo high risk PCI as outpatient.  GI consulted for dysphagia.  The patient has been having cough since she has been at the nursing home, over the past 2-3 days she has been having coughing episodes after she eats associated with dysphagia and odynophagia.  This has never happened to her before.  It involves both food and liquids and she is able to keep some food down, although the more she eats she has to throw up.  She also reports nausea that she has also had in the outpatient setting but it was controlled with antiemetics at the time.  She has never had an EGD.  She is on dual anti-platelet therapy for recent STEMI.

## 2022-04-18 NOTE — SUBJECTIVE & OBJECTIVE
Interval History: Patient continues to have diarrhea; does not appear to be C. Diff. Will consider restarting cholestyramine and loperamide PRN for symptomatic control. Worsening Cr in the setting of cardiorenal syndrome - CXR w/ persistent pulmonary edema. However, symptomatically, patient's fluid status is improving. Will continue current management.     Review of Systems   Constitutional: Negative for chills, decreased appetite, diaphoresis, fever, malaise/fatigue and weight gain.   HENT: Negative.  Negative for congestion and sore throat.    Eyes:  Negative for blurred vision and double vision.   Cardiovascular:  Negative for chest pain, dyspnea on exertion, irregular heartbeat, leg swelling and palpitations.   Respiratory:  Positive for cough. Negative for hemoptysis, shortness of breath and wheezing.    Hematologic/Lymphatic: Positive for bleeding problem. Bruises/bleeds easily.   Skin: Negative.  Negative for itching and rash.   Musculoskeletal:  Positive for arthritis. Negative for falls and joint pain.   Gastrointestinal:  Positive for nausea and vomiting. Negative for bloating, abdominal pain, constipation, diarrhea and melena.   Genitourinary: Negative.  Negative for hematuria and urgency.   Neurological:  Negative for dizziness, headaches, light-headedness and numbness.   Psychiatric/Behavioral: Negative.  Negative for altered mental status and depression.    Objective:     Vital Signs (Most Recent):  Temp: 98.1 °F (36.7 °C) (04/18/22 0708)  Pulse: 86 (04/18/22 0826)  Resp: 18 (04/18/22 0826)  BP: (!) 154/67 (04/18/22 0708)  SpO2: (!) 93 % (04/18/22 0858)   Vital Signs (24h Range):  Temp:  [96.8 °F (36 °C)-98.8 °F (37.1 °C)] 98.1 °F (36.7 °C)  Pulse:  [54-86] 86  Resp:  [16-22] 18  SpO2:  [93 %-100 %] 93 %  BP: (124-165)/(54-71) 154/67     Weight: 78.7 kg (173 lb 6.3 oz)  Body mass index is 30.71 kg/m².     SpO2: (!) 93 %  O2 Device (Oxygen Therapy): nasal cannula      Intake/Output Summary (Last 24  hours) at 4/18/2022 1001  Last data filed at 4/18/2022 0600  Gross per 24 hour   Intake 940 ml   Output 900 ml   Net 40 ml       Lines/Drains/Airways       Drain  Duration             Female External Urinary Catheter 03/27/22 2000 21 days              Peripheral Intravenous Line  Duration                  Peripheral IV - Single Lumen 04/17/22 0200 20 G Anterior;Left;Proximal Forearm 1 day                    Physical Exam  Vitals and nursing note reviewed.   Constitutional:       Appearance: Normal appearance.   HENT:      Head: Normocephalic and atraumatic.      Nose: Nose normal.      Mouth/Throat:      Mouth: Mucous membranes are moist.      Pharynx: Oropharynx is clear.      Comments: Oozing blister on bottom lip   Eyes:      Extraocular Movements: Extraocular movements intact.   Cardiovascular:      Rate and Rhythm: Normal rate and regular rhythm.      Pulses: Normal pulses.      Heart sounds: Normal heart sounds.   Pulmonary:      Effort: Pulmonary effort is normal.      Breath sounds: Normal breath sounds. No wheezing or rales.   Abdominal:      General: Bowel sounds are normal.      Palpations: Abdomen is soft.      Tenderness: There is no abdominal tenderness.      Hernia: A hernia (R hernia abdominal hernia, reducable, non-painful) is present.   Musculoskeletal:         General: Normal range of motion.      Cervical back: Normal range of motion and neck supple.      Right lower leg: No edema.      Left lower leg: No edema.   Skin:     General: Skin is warm and dry.   Neurological:      General: No focal deficit present.      Mental Status: She is alert and oriented to person, place, and time.   Psychiatric:         Mood and Affect: Mood normal.         Behavior: Behavior normal.       Significant Labs: CMP   Recent Labs   Lab 04/17/22  0334 04/18/22  0236    140   K 3.7 3.7   CL 96 99   CO2 27 28   * 141*   BUN 49* 52*   CREATININE 2.1* 2.3*   CALCIUM 9.1 8.9   PROT 6.4 6.3   ALBUMIN 2.7* 2.6*    BILITOT 0.4 0.4   ALKPHOS 65 63   AST 24 22   ALT 14 13   ANIONGAP 16 13   ESTGFRAFRICA 26.2* 23.4*   EGFRNONAA 22.7* 20.3*    and CBC   Recent Labs   Lab 04/17/22  0334 04/18/22  0236   WBC 16.31* 11.91   HGB 10.0* 10.2*   HCT 32.9* 31.7*    199       Significant Imaging: Echocardiogram: Transthoracic echo (TTE) complete (Cupid Only):   Results for orders placed or performed during the hospital encounter of 04/08/22   Echo   Result Value Ref Range    LVIDd 3.37 (A) 3.5 - 6.0 cm    LVIDs 2.36 2.1 - 4.0 cm    LV Diastolic Volume 46.49 mL    LV Systolic Volume 19.23 mL    FS 30 %    LV Systolic Volume Index 10.6 mL/m2    LV Diastolic Volume Index 25.54 mL/m2    BSA 1.87 m2    EF 60 %    Narrative    · Limited study for wall morgan. several off axis views.  · The estimated ejection fraction is 60%.  · The left ventricle is normal in size with normal systolic function.  · The following segments are hypokinetic: basal inferoseptal and basal   inferior. All other segments are normal.  · Normal right ventricular size with normal right ventricular systolic   function.

## 2022-04-18 NOTE — ASSESSMENT & PLAN NOTE
Admitted with acute onset chest pressure, EKG with lateral ST depressions, and troponin 0.06 (mildly elevated) all consistent with NSTEMI. Currently stable and asymptomatic. 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 Outpt plan to follow-up with Dr Chappell for high risk PCI. Troponin peaked at 0.07.     - On ASA/Brilinta. Heparin held from admission due to prior spontaneous rectus sheath hematoma s/p L heart cath.   - Continue atorvastatin, metoprolol  - SL Nitro PRN for chest pain  - patient planned to have high risk PCI w/ Dr. Chappell, will plan for outpatient.

## 2022-04-18 NOTE — SUBJECTIVE & OBJECTIVE
Subjective:   GI initially consulted on 4/12 for dysphagia.    Interval History: Not a candidate for EGD in light of cardiac issues ongoing at this time.  Today, sitting up in bedside recliner for visit.  Reports improvement in cough, however still present.  Her family member at bedside agrees.  She does report improvement in tolerance of foods last night (is NPO this AM for procedure?), however she was not aware of the chopped food recommendations and this made food unappealing to her.    Review of Systems   HENT:  Positive for trouble swallowing (improving).    Respiratory:  Positive for cough (improving).    Gastrointestinal:  Negative for abdominal pain.   Objective:     Vital Signs (Most Recent):  Temp: 98.1 °F (36.7 °C) (04/18/22 0708)  Pulse: 86 (04/18/22 0826)  Resp: 18 (04/18/22 0826)  BP: (!) 154/67 (04/18/22 0708)  SpO2: (!) 93 % (04/18/22 0858)   Vital Signs (24h Range):  Temp:  [96.8 °F (36 °C)-98.8 °F (37.1 °C)] 98.1 °F (36.7 °C)  Pulse:  [54-86] 86  Resp:  [16-22] 18  SpO2:  [93 %-100 %] 93 %  BP: (131-165)/(62-71) 154/67     Weight: 78.7 kg (173 lb 6.3 oz) (04/10/22 0737)  Body mass index is 30.71 kg/m².      Intake/Output Summary (Last 24 hours) at 4/18/2022 1443  Last data filed at 4/18/2022 0600  Gross per 24 hour   Intake 820 ml   Output 900 ml   Net -80 ml       Lines/Drains/Airways       Drain  Duration             Female External Urinary Catheter 03/27/22 2000 21 days              Peripheral Intravenous Line  Duration                  Peripheral IV - Single Lumen 04/17/22 0200 20 G Anterior;Left;Proximal Forearm 1 day                    Physical Exam  Vitals reviewed.   Constitutional:       General: She is not in acute distress.     Appearance: She is obese.   HENT:      Head: Normocephalic and atraumatic.   Eyes:      Extraocular Movements: Extraocular movements intact.   Pulmonary:      Effort: Pulmonary effort is normal. No respiratory distress.      Comments: Nasal cannula in  place  Skin:     General: Skin is warm and dry.      Capillary Refill: Capillary refill takes less than 2 seconds.   Neurological:      Mental Status: She is alert. Mental status is at baseline.   Psychiatric:         Mood and Affect: Mood normal.         Behavior: Behavior normal.         Thought Content: Thought content normal.       Significant Labs:  CBC:   Recent Labs   Lab 04/17/22  0334 04/18/22  0236   WBC 16.31* 11.91   HGB 10.0* 10.2*   HCT 32.9* 31.7*    199     CMP:   Recent Labs   Lab 04/18/22 0236   *   CALCIUM 8.9   ALBUMIN 2.6*   PROT 6.3      K 3.7   CO2 28   CL 99   BUN 52*   CREATININE 2.3*   ALKPHOS 63   ALT 13   AST 22   BILITOT 0.4     Coagulation: No results for input(s): PT, INR, APTT in the last 48 hours.  All pertinent lab results from the last 24 hours have been reviewed.      Significant Imaging:  Imaging results within the past 24 hours have been reviewed.  Esophagram with esophageal dysmotility - no evidence of mass or stricture. Exam limited due to positioning.

## 2022-04-18 NOTE — ASSESSMENT & PLAN NOTE
- leukocytosis 4/17: patient does not appear to be infectious. Does endorse 5-7 episodes of watery diarrhea/day x 2 days. Low suspicion for C diff. Will continue patient's home cholestyramine; symptoms improved

## 2022-04-18 NOTE — ASSESSMENT & PLAN NOTE
Normal EF 2/2022 and no s/s of exacerbation. TTE on 04/09 with EF 60, hypokinetic basal inferoseptal and inferior hypokinesis.  - will start IV lasix 80mg BID. Worsening Cr likely related to cardiorenal disease. Lasix transitioned to PO torsemide 40mg QD  - Daily weights, strict I/Os  - Not on MRA or SLGT2; can consider as outpt

## 2022-04-18 NOTE — PLAN OF CARE
Goals and plan of care reviewed and mutually agreed upon including: continue treatments as ordered, npo for cath today, increase diet and activity as tolerated, remain fever/fall/pain free. All topics educated on, verbalized understanding, patient has been updating family via cell phone frequently, no further questions at this time. Patient encouraged to call for assistance with ambulation, bed in lowest and locked position, all belongings and call bell in reach.

## 2022-04-18 NOTE — PT/OT/SLP PROGRESS
Physical Therapy      Patient Name:  Marisol Kerr   MRN:  [id number]    Patient not seen today secondary to pt in bathroom requesting additional time before therapy in AM, and pt working with OT in PM. Unable to follow up for additional attempt. Will follow up 4/19/22.     4/18/2022

## 2022-04-18 NOTE — ASSESSMENT & PLAN NOTE
Marisol Kerr is a 76 y.o. female with history of DM, HTN, HLD, CKD, who was admitted for chest pain from rehab. GI consulted for dysphagia.     The patient developed dysphagia over the past 2-3 days, associated with cough, nausea and vomiting.  It is unlikely that she has mechanical obstruction that developed over this short of a time, unfortunately she is not a good candidate at this time for undergoing EGD requiring anesthesia.  Would recommend further evaluation of cough per primary team.  She might also benefit from antiemetics.    4/18: reports cough has improved.  Reports she was able to swallow dinner last night but it wasn't appealing since it was all chopped.  Denies abd pain.  Esophagram 4/14 noting esophageal dysmotility.  Has been started on Diltiazem 120 mg to see if this is beneficial.     Recommendations:  - not a good candidate for undergoing EGD with anesthesia at this time due to cardiac co-morbidities  - antiemetics PRN  - safe eating habits:      1. Eat upright at 90 deg and remain upright at least 30 min -1 hour post meal.     2. Chew food thoroughly     3.  One bite at a time     4. Sips of water in between bites     5. Limit other distractions during meals (avoid talking, turning, etc).  - f/u Speech and dietitian recommendations   - cough workup per primary team  - please notify GI with any acute changes in patient's clinical status

## 2022-04-18 NOTE — PROGRESS NOTES
[facility]  Interventional Cardiology  Progress Note    Patient Name: Marisol Kerr  MRN: [id number]  Admission Date: 4/8/2022  Hospital Length of Stay: 10 days  Code Status: Full Code   Attending Physician: Kaycee Mitchell MD   Primary Care Physician: Khadar Dwyer MD  Principal Problem:NSTEMI (non-ST elevated myocardial infarction)    Subjective:     Interval History: Patient was seen this AM, Cr elevated at 2.3. Hgb stable. Feels bad because she didn't sleep well from persistent cough. WBC count normalized.    Objective:     Vital Signs (Most Recent):  Temp: 97.4 °F (36.3 °C) (04/18/22 1519)  Pulse: (!) 57 (04/18/22 1519)  Resp: 17 (04/18/22 1519)  BP: (!) 107/51 (04/18/22 1519)  SpO2: 97 % (04/18/22 1519)   Vital Signs (24h Range):  Temp:  [96.8 °F (36 °C)-98.8 °F (37.1 °C)] 97.4 °F (36.3 °C)  Pulse:  [55-86] 57  Resp:  [16-22] 17  SpO2:  [93 %-100 %] 97 %  BP: (107-165)/(51-71) 107/51     Weight: 78.7 kg (173 lb 6.3 oz)  Body mass index is 30.71 kg/m².    SpO2: 97 %  O2 Device (Oxygen Therapy): nasal cannula      Intake/Output Summary (Last 24 hours) at 4/18/2022 1625  Last data filed at 4/18/2022 1400  Gross per 24 hour   Intake 1120 ml   Output 900 ml   Net 220 ml       Lines/Drains/Airways       Drain  Duration             Female External Urinary Catheter 03/27/22 2000 21 days              Peripheral Intravenous Line  Duration                  Peripheral IV - Single Lumen 04/17/22 0200 20 G Anterior;Left;Proximal Forearm 1 day         Peripheral IV - Single Lumen 04/18/22 1349 20 G;1 3/4 in Right Forearm <1 day                    Physical Exam  Vitals reviewed.   Constitutional:       General: She is not in acute distress.     Appearance: Normal appearance. She is obese. She is not toxic-appearing or diaphoretic.   HENT:      Head: Normocephalic and atraumatic.      Ears:      Comments: Hard of hearing, hearing aids in place     Mouth/Throat:      Mouth: Mucous membranes are moist.    Cardiovascular:      Rate and Rhythm: Normal rate and regular rhythm.      Heart sounds: No murmur heard.    No friction rub. No gallop.   Pulmonary:      Effort: Pulmonary effort is normal. No respiratory distress.      Breath sounds: Normal breath sounds.      Comments: Chronic NC use  Abdominal:      Palpations: Abdomen is soft.   Musculoskeletal:      Right lower leg: No edema.      Left lower leg: No edema.   Skin:     General: Skin is warm.   Neurological:      Mental Status: She is alert and oriented to person, place, and time. Mental status is at baseline.     Significant Labs: BMP:   Recent Labs   Lab 04/17/22  0334 04/18/22  0236   * 141*    140   K 3.7 3.7   CL 96 99   CO2 27 28   BUN 49* 52*   CREATININE 2.1* 2.3*   CALCIUM 9.1 8.9   MG 1.6 1.8   , CMP   Recent Labs   Lab 04/17/22 0334 04/18/22  0236    140   K 3.7 3.7   CL 96 99   CO2 27 28   * 141*   BUN 49* 52*   CREATININE 2.1* 2.3*   CALCIUM 9.1 8.9   PROT 6.4 6.3   ALBUMIN 2.7* 2.6*   BILITOT 0.4 0.4   ALKPHOS 65 63   AST 24 22   ALT 14 13   ANIONGAP 16 13   ESTGFRAFRICA 26.2* 23.4*   EGFRNONAA 22.7* 20.3*   , CBC   Recent Labs   Lab 04/17/22 0334 04/18/22  0236   WBC 16.31* 11.91   HGB 10.0* 10.2*   HCT 32.9* 31.7*    199   , INR No results for input(s): INR, PROTIME in the last 48 hours., Lipid Panel No results for input(s): CHOL, HDL, LDLCALC, TRIG, CHOLHDL in the last 48 hours., and Troponin No results for input(s): TROPONINI in the last 48 hours.    Significant Imaging: Echocardiogram: 2D echo with color flow doppler: No results found for this or any previous visit. and Transthoracic echo (TTE) complete (Cupid Only):   Results for orders placed or performed during the hospital encounter of 04/08/22   Echo   Result Value Ref Range    LVIDd 3.37 (A) 3.5 - 6.0 cm    LVIDs 2.36 2.1 - 4.0 cm    LV Diastolic Volume 46.49 mL    LV Systolic Volume 19.23 mL    FS 30 %    LV Systolic Volume Index 10.6 mL/m2    LV  Diastolic Volume Index 25.54 mL/m2    BSA 1.87 m2    EF 60 %    Narrative    · Limited study for wall morgan. several off axis views.  · The estimated ejection fraction is 60%.  · The left ventricle is normal in size with normal systolic function.  · The following segments are hypokinetic: basal inferoseptal and basal   inferior. All other segments are normal.  · Normal right ventricular size with normal right ventricular systolic   function.        Assessment and Plan:     Patient is a 76 y.o. female presenting with:    * NSTEMI (non-ST elevated myocardial infarction)  Patient is a 76 year old female with risk factors for CAD, known CAD from angiogram 02/10/2022 that presented with episode of chest discomfort on 04/07/2022 for which patient was admitted to [facility]. Transferred to [facility] on 04/08/2022 during the evening. Chest pain free since transition to [facility]. ECG did not show significant ST-T changes. Trop peaked at 0.072 at [place]. Loaded with aspirin and Brilinta. Patient was planned to undergo outpatient  of the distal RCA.   Transitioned to [facility] for possible ; however, due to elevation in Cr with concerns for risk for dialysis as well as morbidity from the procedure, the patient and daughter have opted to pursue medical management of symptoms at this time, which is reasonable.   - Continue medical management with: aspirin 81 mg po daily + metoprolol succinate 50 + isosorbide mononitrate 30 + atorvastatin 80 mg PO HS  - Appreciate primary team assistance in care coordination with daughter Marisol Kerr    Chronic diastolic congestive heart failure  HFpEF LVEF 60%     Interpretation Summary  · Limited study for wall morgan. several off axis views.  · The estimated ejection fraction is 60%.  · The left ventricle is normal in size with normal systolic function.  · The following segments are hypokinetic: basal inferoseptal and basal inferior. All other segments are normal.  · Normal right  ventricular size with normal right ventricular systolic function.  Last BNP reviewed- and noted below   Recent Labs   Lab 04/15/22  1012   *     Management as per primary    Chronic hypoxemic respiratory failure  On 3 LPM at home    CKD (chronic kidney disease), stage III  Cr 1.7 on 04/11/2022  Cr 2.3 today    Essential hypertension, benign  As per primary        VTE Risk Mitigation (From admission, onward)         Ordered     IP VTE HIGH RISK PATIENT  Once         04/08/22 2203     Place sequential compression device  Until discontinued         04/08/22 2203              Thank you for your consultation. We will sign off. Please call with questions or concerns.     Plan of care was discussed with staff, Dr Chappell.     Zoran Martin MD  Interventional Cardiology, Fellow PGY4  [facility] - [facility] Step[facility]

## 2022-04-19 PROBLEM — Z74.09 IMPAIRED FUNCTIONAL MOBILITY AND ENDURANCE: Status: ACTIVE | Noted: 2022-04-19

## 2022-04-19 LAB
ALBUMIN SERPL BCP-MCNC: 2.7 G/DL (ref 3.5–5.2)
ALP SERPL-CCNC: 68 U/L (ref 55–135)
ALT SERPL W/O P-5'-P-CCNC: 16 U/L (ref 10–44)
ANION GAP SERPL CALC-SCNC: 13 MMOL/L (ref 8–16)
AST SERPL-CCNC: 17 U/L (ref 10–40)
BASOPHILS # BLD AUTO: 0.05 K/UL (ref 0–0.2)
BASOPHILS NFR BLD: 0.4 % (ref 0–1.9)
BILIRUB SERPL-MCNC: 0.3 MG/DL (ref 0.1–1)
BUN SERPL-MCNC: 56 MG/DL (ref 8–23)
CALCIUM SERPL-MCNC: 9.1 MG/DL (ref 8.7–10.5)
CHLORIDE SERPL-SCNC: 102 MMOL/L (ref 95–110)
CO2 SERPL-SCNC: 30 MMOL/L (ref 23–29)
CREAT SERPL-MCNC: 2.1 MG/DL (ref 0.5–1.4)
DIFFERENTIAL METHOD: ABNORMAL
EOSINOPHIL # BLD AUTO: 0.2 K/UL (ref 0–0.5)
EOSINOPHIL NFR BLD: 1.9 % (ref 0–8)
ERYTHROCYTE [DISTWIDTH] IN BLOOD BY AUTOMATED COUNT: 12.9 % (ref 11.5–14.5)
EST. GFR  (AFRICAN AMERICAN): 26.2 ML/MIN/1.73 M^2
EST. GFR  (NON AFRICAN AMERICAN): 22.7 ML/MIN/1.73 M^2
GLUCOSE SERPL-MCNC: 118 MG/DL (ref 70–110)
HCT VFR BLD AUTO: 32.6 % (ref 37–48.5)
HGB BLD-MCNC: 10 G/DL (ref 12–16)
IMM GRANULOCYTES # BLD AUTO: 0.17 K/UL (ref 0–0.04)
IMM GRANULOCYTES NFR BLD AUTO: 1.4 % (ref 0–0.5)
LYMPHOCYTES # BLD AUTO: 1.3 K/UL (ref 1–4.8)
LYMPHOCYTES NFR BLD: 10.1 % (ref 18–48)
MAGNESIUM SERPL-MCNC: 2 MG/DL (ref 1.6–2.6)
MCH RBC QN AUTO: 27.9 PG (ref 27–31)
MCHC RBC AUTO-ENTMCNC: 30.7 G/DL (ref 32–36)
MCV RBC AUTO: 91 FL (ref 82–98)
MONOCYTES # BLD AUTO: 1 K/UL (ref 0.3–1)
MONOCYTES NFR BLD: 8.1 % (ref 4–15)
NEUTROPHILS # BLD AUTO: 9.8 K/UL (ref 1.8–7.7)
NEUTROPHILS NFR BLD: 78.1 % (ref 38–73)
NRBC BLD-RTO: 0 /100 WBC
PLATELET # BLD AUTO: 210 K/UL (ref 150–450)
PMV BLD AUTO: 11.6 FL (ref 9.2–12.9)
POCT GLUCOSE: 177 MG/DL (ref 70–110)
POCT GLUCOSE: 210 MG/DL (ref 70–110)
POCT GLUCOSE: 297 MG/DL (ref 70–110)
POTASSIUM SERPL-SCNC: 3.8 MMOL/L (ref 3.5–5.1)
PROT SERPL-MCNC: 6.1 G/DL (ref 6–8.4)
RBC # BLD AUTO: 3.59 M/UL (ref 4–5.4)
SODIUM SERPL-SCNC: 145 MMOL/L (ref 136–145)
WBC # BLD AUTO: 12.49 K/UL (ref 3.9–12.7)

## 2022-04-19 PROCEDURE — 80053 COMPREHEN METABOLIC PANEL: CPT

## 2022-04-19 PROCEDURE — 99900035 HC TECH TIME PER 15 MIN (STAT)

## 2022-04-19 PROCEDURE — 25000242 PHARM REV CODE 250 ALT 637 W/ HCPCS: Performed by: INTERNAL MEDICINE

## 2022-04-19 PROCEDURE — 25000003 PHARM REV CODE 250: Performed by: INTERNAL MEDICINE

## 2022-04-19 PROCEDURE — 36415 COLL VENOUS BLD VENIPUNCTURE: CPT | Performed by: INTERNAL MEDICINE

## 2022-04-19 PROCEDURE — 27000221 HC OXYGEN, UP TO 24 HOURS

## 2022-04-19 PROCEDURE — 83735 ASSAY OF MAGNESIUM: CPT | Performed by: INTERNAL MEDICINE

## 2022-04-19 PROCEDURE — 97116 GAIT TRAINING THERAPY: CPT

## 2022-04-19 PROCEDURE — 99222 PR INITIAL HOSPITAL CARE,LEVL II: ICD-10-PCS | Mod: ,,, | Performed by: NURSE PRACTITIONER

## 2022-04-19 PROCEDURE — 99222 1ST HOSP IP/OBS MODERATE 55: CPT | Mod: ,,, | Performed by: NURSE PRACTITIONER

## 2022-04-19 PROCEDURE — 99232 PR SUBSEQUENT HOSPITAL CARE,LEVL II: ICD-10-PCS | Mod: ,,, | Performed by: FAMILY MEDICINE

## 2022-04-19 PROCEDURE — 99231 PR SUBSEQUENT HOSPITAL CARE,LEVL I: ICD-10-PCS | Mod: GC,,, | Performed by: INTERNAL MEDICINE

## 2022-04-19 PROCEDURE — 63600175 PHARM REV CODE 636 W HCPCS: Performed by: STUDENT IN AN ORGANIZED HEALTH CARE EDUCATION/TRAINING PROGRAM

## 2022-04-19 PROCEDURE — 94761 N-INVAS EAR/PLS OXIMETRY MLT: CPT

## 2022-04-19 PROCEDURE — 63600175 PHARM REV CODE 636 W HCPCS

## 2022-04-19 PROCEDURE — 25000003 PHARM REV CODE 250

## 2022-04-19 PROCEDURE — 99232 SBSQ HOSP IP/OBS MODERATE 35: CPT | Mod: ,,, | Performed by: FAMILY MEDICINE

## 2022-04-19 PROCEDURE — 92526 ORAL FUNCTION THERAPY: CPT

## 2022-04-19 PROCEDURE — 97535 SELF CARE MNGMENT TRAINING: CPT

## 2022-04-19 PROCEDURE — 99231 SBSQ HOSP IP/OBS SF/LOW 25: CPT | Mod: GC,,, | Performed by: INTERNAL MEDICINE

## 2022-04-19 PROCEDURE — 85025 COMPLETE CBC W/AUTO DIFF WBC: CPT

## 2022-04-19 PROCEDURE — 20600001 HC STEP DOWN PRIVATE ROOM

## 2022-04-19 RX ORDER — DILTIAZEM HYDROCHLORIDE 120 MG/1
120 CAPSULE, COATED, EXTENDED RELEASE ORAL DAILY
Qty: 30 CAPSULE | Refills: 11 | Status: SHIPPED | OUTPATIENT
Start: 2022-04-19 | End: 2022-05-17 | Stop reason: SDUPTHER

## 2022-04-19 RX ORDER — ISOSORBIDE MONONITRATE 60 MG/1
60 TABLET, EXTENDED RELEASE ORAL DAILY
Qty: 30 TABLET | Refills: 11 | Status: ON HOLD | OUTPATIENT
Start: 2022-04-20 | End: 2022-06-30 | Stop reason: HOSPADM

## 2022-04-19 RX ORDER — HEPARIN SODIUM 5000 [USP'U]/ML
5000 INJECTION, SOLUTION INTRAVENOUS; SUBCUTANEOUS EVERY 8 HOURS
Status: DISCONTINUED | OUTPATIENT
Start: 2022-04-19 | End: 2022-04-22 | Stop reason: HOSPADM

## 2022-04-19 RX ORDER — PANTOPRAZOLE SODIUM 40 MG/1
40 TABLET, DELAYED RELEASE ORAL DAILY
Status: DISCONTINUED | OUTPATIENT
Start: 2022-04-20 | End: 2022-04-22 | Stop reason: HOSPADM

## 2022-04-19 RX ORDER — TORSEMIDE 20 MG/1
40 TABLET ORAL DAILY
Qty: 60 TABLET | Refills: 3 | Status: SHIPPED | OUTPATIENT
Start: 2022-04-20 | End: 2022-04-22 | Stop reason: HOSPADM

## 2022-04-19 RX ORDER — CHOLESTYRAMINE 4 G/9G
1 POWDER, FOR SUSPENSION ORAL 2 TIMES DAILY
Qty: 180 PACKET | Refills: 3 | Status: SHIPPED | OUTPATIENT
Start: 2022-04-19 | End: 2022-09-09 | Stop reason: SDUPTHER

## 2022-04-19 RX ORDER — ISOSORBIDE MONONITRATE 60 MG/1
60 TABLET, EXTENDED RELEASE ORAL DAILY
Status: DISCONTINUED | OUTPATIENT
Start: 2022-04-19 | End: 2022-04-22 | Stop reason: HOSPADM

## 2022-04-19 RX ORDER — ASPIRIN 81 MG/1
81 TABLET ORAL EVERY MORNING
Qty: 360 TABLET | Refills: 0 | Status: SHIPPED | OUTPATIENT
Start: 2022-04-19 | End: 2022-04-22 | Stop reason: HOSPADM

## 2022-04-19 RX ADMIN — PIPERACILLIN SODIUM AND TAZOBACTAM SODIUM 4.5 G: 4; .5 INJECTION, POWDER, FOR SOLUTION INTRAVENOUS at 05:04

## 2022-04-19 RX ADMIN — GABAPENTIN 100 MG: 100 CAPSULE ORAL at 08:04

## 2022-04-19 RX ADMIN — PREDNISONE 40 MG: 20 TABLET ORAL at 08:04

## 2022-04-19 RX ADMIN — TORSEMIDE 40 MG: 20 TABLET ORAL at 08:04

## 2022-04-19 RX ADMIN — GUAIFENESIN 600 MG: 600 TABLET, EXTENDED RELEASE ORAL at 08:04

## 2022-04-19 RX ADMIN — ATORVASTATIN CALCIUM 80 MG: 20 TABLET, FILM COATED ORAL at 08:04

## 2022-04-19 RX ADMIN — LOPERAMIDE HYDROCHLORIDE 2 MG: 2 CAPSULE ORAL at 08:04

## 2022-04-19 RX ADMIN — HEPARIN SODIUM 5000 UNITS: 5000 INJECTION INTRAVENOUS; SUBCUTANEOUS at 10:04

## 2022-04-19 RX ADMIN — ISOSORBIDE MONONITRATE 60 MG: 60 TABLET, EXTENDED RELEASE ORAL at 08:04

## 2022-04-19 RX ADMIN — ASPIRIN 81 MG: 81 TABLET, COATED ORAL at 08:04

## 2022-04-19 RX ADMIN — PANTOPRAZOLE SODIUM 40 MG: 40 TABLET, DELAYED RELEASE ORAL at 08:04

## 2022-04-19 RX ADMIN — INSULIN ASPART 4 UNITS: 100 INJECTION, SOLUTION INTRAVENOUS; SUBCUTANEOUS at 06:04

## 2022-04-19 RX ADMIN — INSULIN ASPART 1 UNITS: 100 INJECTION, SOLUTION INTRAVENOUS; SUBCUTANEOUS at 08:04

## 2022-04-19 RX ADMIN — DILTIAZEM HYDROCHLORIDE 120 MG: 120 CAPSULE, COATED, EXTENDED RELEASE ORAL at 09:04

## 2022-04-19 RX ADMIN — TIOTROPIUM BROMIDE INHALATION SPRAY 2 PUFF: 3.12 SPRAY, METERED RESPIRATORY (INHALATION) at 11:04

## 2022-04-19 RX ADMIN — FLUTICASONE FUROATE AND VILANTEROL TRIFENATATE 1 PUFF: 100; 25 POWDER RESPIRATORY (INHALATION) at 08:04

## 2022-04-19 RX ADMIN — TICAGRELOR 90 MG: 90 TABLET ORAL at 08:04

## 2022-04-19 RX ADMIN — METOPROLOL SUCCINATE 50 MG: 50 TABLET, EXTENDED RELEASE ORAL at 08:04

## 2022-04-19 RX ADMIN — Medication 100 MG: at 08:04

## 2022-04-19 RX ADMIN — CHOLESTYRAMINE 4 G: 4 POWDER, FOR SUSPENSION ORAL at 08:04

## 2022-04-19 NOTE — SUBJECTIVE & OBJECTIVE
Interval History: No acute events overnight. Patient's diarrhea improved w/ cholestyramine. Optimize BP control w/ PO anti-hypertensive's. Plan for discharge pending PM&R's evaluation for dispo w/ home PT/OT vs rehab.     Review of Systems   Constitutional: Negative for chills, decreased appetite, diaphoresis, fever, malaise/fatigue and weight gain.   HENT: Negative.  Negative for congestion and sore throat.    Eyes:  Negative for blurred vision and double vision.   Cardiovascular:  Negative for chest pain, dyspnea on exertion, irregular heartbeat, leg swelling and palpitations.   Respiratory:  Positive for cough. Negative for hemoptysis, shortness of breath and wheezing.    Hematologic/Lymphatic: Positive for bleeding problem. Bruises/bleeds easily.   Skin: Negative.  Negative for itching and rash.   Musculoskeletal:  Positive for arthritis. Negative for falls and joint pain.   Gastrointestinal:  Positive for nausea and vomiting. Negative for bloating, abdominal pain, constipation, diarrhea and melena.   Genitourinary: Negative.  Negative for hematuria and urgency.   Neurological:  Negative for dizziness, headaches, light-headedness and numbness.   Psychiatric/Behavioral: Negative.  Negative for altered mental status and depression.    Objective:     Vital Signs (Most Recent):  Temp: 97 °F (36.1 °C) (04/19/22 0816)  Pulse: 79 (04/19/22 0833)  Resp: 20 (04/19/22 0833)  BP: (!) 159/67 (04/19/22 0816)  SpO2: 99 % (04/19/22 0909)   Vital Signs (24h Range):  Temp:  [97 °F (36.1 °C)-98.5 °F (36.9 °C)] 97 °F (36.1 °C)  Pulse:  [52-83] 79  Resp:  [16-20] 20  SpO2:  [97 %-99 %] 99 %  BP: (107-165)/(51-73) 159/67     Weight: 78.7 kg (173 lb 6.3 oz)  Body mass index is 30.71 kg/m².     SpO2: 99 %  O2 Device (Oxygen Therapy): nasal cannula      Intake/Output Summary (Last 24 hours) at 4/19/2022 1056  Last data filed at 4/19/2022 0900  Gross per 24 hour   Intake 840 ml   Output --   Net 840 ml       Lines/Drains/Airways        Drain  Duration             Female External Urinary Catheter 03/27/22 2000 22 days              Peripheral Intravenous Line  Duration                  Peripheral IV - Single Lumen 04/17/22 0200 20 G Anterior;Left;Proximal Forearm 2 days         Peripheral IV - Single Lumen 04/18/22 1349 20 G;1 3/4 in Right Forearm <1 day                    Physical Exam  Vitals and nursing note reviewed.   Constitutional:       Appearance: Normal appearance.   HENT:      Head: Normocephalic and atraumatic.      Nose: Nose normal.      Mouth/Throat:      Mouth: Mucous membranes are moist.      Pharynx: Oropharynx is clear.      Comments: Oozing blister on bottom lip   Eyes:      Extraocular Movements: Extraocular movements intact.   Cardiovascular:      Rate and Rhythm: Normal rate and regular rhythm.      Pulses: Normal pulses.      Heart sounds: Normal heart sounds.   Pulmonary:      Effort: Pulmonary effort is normal.      Breath sounds: Normal breath sounds. No wheezing or rales.   Abdominal:      General: Bowel sounds are normal.      Palpations: Abdomen is soft.      Tenderness: There is no abdominal tenderness.      Hernia: A hernia (R hernia abdominal hernia, reducable, non-painful) is present.   Musculoskeletal:         General: Normal range of motion.      Cervical back: Normal range of motion and neck supple.      Right lower leg: No edema.      Left lower leg: No edema.   Skin:     General: Skin is warm and dry.   Neurological:      General: No focal deficit present.      Mental Status: She is alert and oriented to person, place, and time.   Psychiatric:         Mood and Affect: Mood normal.         Behavior: Behavior normal.       Significant Labs: CMP   Recent Labs   Lab 04/18/22  0236 04/19/22  0443    145   K 3.7 3.8   CL 99 102   CO2 28 30*   * 118*   BUN 52* 56*   CREATININE 2.3* 2.1*   CALCIUM 8.9 9.1   PROT 6.3 6.1   ALBUMIN 2.6* 2.7*   BILITOT 0.4 0.3   ALKPHOS 63 68   AST 22 17   ALT 13 16    ANIONGAP 13 13   ESTGFRAFRICA 23.4* 26.2*   EGFRNONAA 20.3* 22.7*    and CBC   Recent Labs   Lab 04/18/22  0236 04/19/22  0443   WBC 11.91 12.49   HGB 10.2* 10.0*   HCT 31.7* 32.6*    210       Significant Imaging: Echocardiogram: Transthoracic echo (TTE) complete (Cupid Only):   Results for orders placed or performed during the hospital encounter of 04/08/22   Echo   Result Value Ref Range    LVIDd 3.37 (A) 3.5 - 6.0 cm    LVIDs 2.36 2.1 - 4.0 cm    LV Diastolic Volume 46.49 mL    LV Systolic Volume 19.23 mL    FS 30 %    LV Systolic Volume Index 10.6 mL/m2    LV Diastolic Volume Index 25.54 mL/m2    BSA 1.87 m2    EF 60 %    Narrative    · Limited study for wall morgan. several off axis views.  · The estimated ejection fraction is 60%.  · The left ventricle is normal in size with normal systolic function.  · The following segments are hypokinetic: basal inferoseptal and basal   inferior. All other segments are normal.  · Normal right ventricular size with normal right ventricular systolic   function.

## 2022-04-19 NOTE — ASSESSMENT & PLAN NOTE
Marisol Kerr is a 76 y.o. female with history of DM, HTN, HLD, CKD, who was admitted for chest pain from rehab. GI consulted for dysphagia.     The patient developed dysphagia over the past 2-3 days, associated with cough, nausea and vomiting.  It is unlikely that she has mechanical obstruction that developed over this short of a time, unfortunately she is not a good candidate at this time for undergoing EGD requiring anesthesia.  Would recommend further evaluation of cough per primary team.  She might also benefit from antiemetics.    4/19: no issues of dysphagia or regurgitation reported this AM.  Seen by Speech therapy prior to our visit and was tolerating coffee and arie cracker without incident.  Remaining upright during meals and for at least 1 hour afterwards.  Speech feels she would be able to tolerate a full diet, I agree.  Denies abd pain.  Esophagram 4/14 noting esophageal dysmotility.  Has been started on Diltiazem 120 mg to see if this is beneficial.     Recommendations:  - not a good candidate for undergoing EGD with anesthesia at this time due to cardiac co-morbidities  - antiemetics PRN  - ok for full diet from GI standpoint.  - Continue safe eating habits indefinitely:      1. Eat upright at 90 deg and remain upright at least 30 min -1 hour post meal.     2. Chew food thoroughly     3.  One bite at a time     4. Sips of water in between bites     5. Limit other distractions during meals (avoid talking, turning, etc).  - f/u Speech and dietitian recommendations   - May follow-up with GI in OP setting, could consider EGD in the future pending cardiac status and symptom progress.  - please notify GI with any acute changes in patient's clinical status

## 2022-04-19 NOTE — ASSESSMENT & PLAN NOTE
- L great toe pain clinically consistent w/ gout. S/p 5 day prednisone 40mg course given patient's poor kidney function.

## 2022-04-19 NOTE — CONSULTS
[facility] - [facility]iology Step[facility]  Physical Medicine & Rehab  Consult Note    Patient Name: Marisol Kerr  MRN: [id number]  Admission Date: 4/8/2022  Hospital Length of Stay: 11 days  Attending Physician: Kaycee Mitchell MD     Inpatient consult to Physical Medicine & Rehabilitation  Consult performed by: Alisa Soriano NP  Consult requested by:  Kaycee Mitchell MD    Reason for Consult:  assess rehabilitation needs    Consults  Subjective:     Principal Problem: NSTEMI (non-ST elevated myocardial infarction)    HPI: Per chart review, Marisol Kerr is a 76-year-old female with PMHx of DM2, HTN, HLD, CKD, COPD on home 3L NC, and a recent STEMI s/p LHC at OSH with complications of rectus sheet hematoma/bleeding and transferred to [facility] for further management and deemed not amenable for CABG. Patient presented to [facility] with chest pain.  At OSH, troponin was mildly elevated with lateral ST depression on EKG that have now resolved. Transferred to [facility] on 4/8. Cards consulted and the patient and daughter are pursuing medical management due to elevation in Cr with concerns for risk for dialysis as well as morbidity from the procedure. Hospital course further complicated by elevated Cr, Esophageal dysmotility (GI consulted, EGD not recommended at this time 2/2 cardiac co-comorbidities), and impaired functional mobility.     Functional History: Patient lives [place] with daughter and EDILBERTO in a single story home.  Prior to admission, (I) with ADLs and mod(I) mobility. DME: bedside commode, cane, straight, shower chair    Hospital Course: 4/14: Oral and pharyngeal phases of the swallow appear WFL.  Passed bedside swallow evaluation.  SLP recommending regular diet and thin liquids. PT-Sit to stand SBA & RW. Ambulated 3 ft fws/bwd CGA-SBA & RW.   04/18/2022:  OT-Sit to stand SBA & RW.  Feed (I).    Past Medical History:   Diagnosis Date    CAD (coronary artery disease)     Cancer     colon    CHF (congestive heart  failure)     Colitis     Colon cancer     COPD (chronic obstructive pulmonary disease)     Decreased hearing     Diabetes mellitus     Diabetes mellitus, type 2     Hypercholesterolemia     Hypertension     Hypoxemia     Insomnia     Obesity     Osteoarthritis      Past Surgical History:   Procedure Laterality Date    ANGIOGRAM, CORONARY, WITH LEFT HEART CATHETERIZATION N/A 2/10/2022    Procedure: Angiogram, Coronary, with Left Heart Cath;  Surgeon: Cristian Benjamin MD;  Location: [facility] CATH/EP LAB;  Service: Cardiology;  Laterality: N/A;    CARDIAC CATHETERIZATION      CHOLECYSTECTOMY      COLECTOMY      CORONARY ANGIOPLASTY      CORONARY STENT PLACEMENT      EXTERNAL EAR SURGERY      EYE SURGERY      FLEXIBLE SIGMOIDOSCOPY N/A 9/19/2019    Procedure: SIGMOIDOSCOPY, FLEXIBLE;  Surgeon: Biju Kramer III, MD;  Location: [facility] ENDO;  Service: Endoscopy;  Laterality: N/A;    HYSTERECTOMY      partial     Review of patient's allergies indicates:   Allergen Reactions    Iodinated contrast media     Strawberries [strawberry] Hives and Itching       Scheduled Medications:    aspirin  81 mg Oral QAM    atorvastatin  80 mg Oral Daily    cholestyramine  1 packet Oral BID    coenzyme Q10  100 mg Oral QAM    diltiaZEM  120 mg Oral Daily    ergocalciferol  50,000 Units Oral Q7 Days    fluticasone furoate-vilanteroL  1 puff Inhalation Daily    gabapentin  100 mg Oral QHS    guaiFENesin  600 mg Oral BID    isosorbide mononitrate  60 mg Oral Daily    metoprolol succinate  50 mg Oral Daily    pantoprazole  40 mg Oral BID    polyethylene glycol  17 g Oral Daily    ticagrelor  90 mg Oral BID    tiotropium bromide  2 puff Inhalation Daily    torsemide  40 mg Oral Daily       PRN Medications: acetaminophen, albuterol-ipratropium, benzonatate, dextrose 10%, dextrose 10%, diclofenac sodium, glucagon (human recombinant), glucose, glucose, insulin aspart U-100, loperamide, nitroGLYCERIN,  ondansetron, sodium chloride 0.9%, white petrolatum, zolpidem    Family History       Problem Relation (Age of Onset)    Febrile seizures Mother    Heart failure Father          Tobacco Use    Smoking status: Former Smoker     Packs/day: 3.00     Years: 50.00     Pack years: 150.00     Types: Cigarettes     Start date: 3/30/1964     Quit date: 2006     Years since quittin.1    Smokeless tobacco: Never Used    Tobacco comment: ex smoker 15 years   Substance and Sexual Activity    Alcohol use: Yes    Drug use: No    Sexual activity: Never     Review of Systems   Constitutional:  Positive for activity change. Negative for fatigue and fever.   HENT:  Negative for trouble swallowing and voice change.    Eyes:  Negative for photophobia and visual disturbance.   Respiratory:  Positive for shortness of breath. Negative for cough.    Cardiovascular:  Negative for chest pain and palpitations.   Gastrointestinal:  Negative for nausea and vomiting.   Genitourinary:  Negative for difficulty urinating and flank pain.   Musculoskeletal:  Positive for gait problem. Negative for arthralgias.   Skin:  Negative for color change and rash.   Neurological:  Positive for weakness. Negative for speech difficulty.   Psychiatric/Behavioral:  Negative for agitation and confusion. The patient is nervous/anxious.    Objective:     Vital Signs (Most Recent):  Temp: 97 °F (36.1 °C) (22 0816)  Pulse: 79 (22 0833)  Resp: 20 (22 0833)  BP: (!) 159/67 (22 0816)  SpO2: 99 % (22 08)      Vital Signs (24h Range):  Temp:  [97 °F (36.1 °C)-98.5 °F (36.9 °C)] 97 °F (36.1 °C)  Pulse:  [52-83] 79  Resp:  [16-20] 20  SpO2:  [97 %-99 %] 99 %  BP: (107-165)/(51-73) 159/67     Body mass index is 30.71 kg/m².    Physical Exam  Vitals reviewed.   Constitutional:       General: She is not in acute distress.     Appearance: She is well-developed.      Comments: Sitting EOB    HENT:      Head: Normocephalic and  atraumatic.   Eyes:      General:         Right eye: No discharge.         Left eye: No discharge.   Cardiovascular:      Rate and Rhythm: Normal rate.   Pulmonary:      Effort: Pulmonary effort is normal. No respiratory distress.      Comments: On NC   Abdominal:      Palpations: Abdomen is soft.      Tenderness: There is no abdominal tenderness.   Musculoskeletal:         General: No deformity.      Cervical back: Neck supple.   Skin:     General: Skin is warm and dry.   Neurological:      Mental Status: She is alert and oriented to person, place, and time.      Motor: Weakness (non-focal) present.      Comments: Follows commands    Psychiatric:         Mood and Affect: Mood is anxious.         Behavior: Behavior normal. Behavior is cooperative.       Diagnostic Results:   Labs: Reviewed  X-Ray: Reviewed    Assessment/Plan:     * NSTEMI (non-ST elevated myocardial infarction)  -primary team managing  -pursuing medical management at this time     Impaired functional mobility and endurance  See hospital course for functional status.      Recommendations  -  Encourage mobility, OOB in chair, and early ambulation as appropriate  -  PT/OT evaluate and treat  -  Pain management  -  DVT prophylaxis if appropriate   -  Monitor for and prevent skin breakdown and pressure ulcers  · Early mobility, repositioning/weight shifting every 20-30 minutes when sitting, turn patient every 2 hours, proper mattress/overlay and chair cushioning, pressure relief/heel protector boots    Esophageal dysmotility  -GI consulted, EGD not recommended at this time 2/2 cardiac co-comorbidities    PM&R Recommendation:     At this time, the PM&R team has reviewed this patient's ongoing medical case including inpatient diagnosis, medical history, clinical examination, labs, vitals, current social and functional history to provide the post-acute recommendation as follows:     RECOMMENDATIONS: home health PT/OT      We will sign off due to discharge  soon.      Thank you for your consult.     Alisa Soriano NP  Department of Physical Medicine & Rehab  [facility] - Cardiology Step[facility]

## 2022-04-19 NOTE — PLAN OF CARE
Pt is AAOx3. POC reviewed at bedside.  On 2 LO@2 via NC. Pt denies any chest pain or palpitation, no s/s of respiratory distress.  Pt has remained free from injury during this shift. Pt bed in low locked position. Call light and personal belongings within reach. Side rails up x2.  Pt was advice to call nurse with any questions or concerns. Will continue to monitor.

## 2022-04-19 NOTE — CONSULTS
Inpatient consult to Physical Medicine Rehab  Consult performed by: Alisa Soriano NP  Consult ordered by: Kaycee Mitchell MD  Reason for consult: assess rehab needs        Reviewed patient history and current admission.  PM&R following.     JESSICA Bone, FNP-C  Physical Medicine & Rehabilitation   04/19/2022

## 2022-04-19 NOTE — HPI
Per chart review, Marisol Kerr is a 76-year-old female with PMHx of DM2, HTN, HLD, CKD, COPD on home 3L NC, and a recent STEMI s/p LHC at OSH with complications of rectus sheet hematoma/bleeding and transferred to [facility] for further management and deemed not amenable for CABG. Patient presented to [facility] with chest pain.  At OSH, troponin was mildly elevated with lateral ST depression on EKG that have now resolved. Transferred to [facility] on 4/8. Cards consulted and the patient and daughter are pursuing medical management due to elevation in Cr with concerns for risk for dialysis as well as morbidity from the procedure. Hospital course further complicated by elevated Cr, Esophageal dysmotility (GI consulted, EGD not recommended at this time 2/2 cardiac co-comorbidities), and impaired functional mobility.     Functional History: Patient lives [place] with daughter and EDILBERTO in a single story home.  Prior to admission, (I) with ADLs and mod(I) mobility. DME: bedside commode, cane, straight, shower chair

## 2022-04-19 NOTE — PT/OT/SLP PROGRESS
Occupational Therapy   Treatment    Name: Marisol Kerr  MRN: [id number]  Admitting Diagnosis:  NSTEMI (non-ST elevated myocardial infarction)  4 Days Post-Op    Recommendations:     Discharge Recommendations: home health PT, home health OT  Discharge Equipment Recommendations:  none  Barriers to discharge:  None    Assessment:     Marisol Kerr is a 76 y.o. female with a medical diagnosis of NSTEMI (non-ST elevated myocardial infarction).  She presents with performance deficits affecting function are weakness, impaired endurance, impaired self care skills, impaired functional mobilty, gait instability, impaired cardiopulmonary response to activity. Pt performed sit<>stand transfer from bedside chair with SBA and functional mobility household distance with SBA using RW. Pt educated on SNF vs HH and educated on safety within the home environment for ADL management and mobility. Pt agreeable to d/c recs for HH PT/OT and medical team notified of d/c recs via secure chat. Pt would benefit from continued skilled acute OT services in order to maximize independence and safety with ADLs and functional mobility to ensure safe return to PLOF in the least restrictive environment. OT recommending HH once pt is medically appropriate for d/c.         Rehab Prognosis:  Good; patient would benefit from acute skilled OT services to address these deficits and reach maximum level of function.       Plan:     Patient to be seen 3 x/week to address the above listed problems via self-care/home management, therapeutic activities, therapeutic exercises  · Plan of Care Expires: 04/29/22  · Plan of Care Reviewed with: patient    Subjective     Pain/Comfort:  · Pain Rating 1: 0/10  · Pain Rating Post-Intervention 1: 0/10    Objective:     Communicated with: RN prior to session.  Patient found up in chair with telemetry, oxygen, PureWick, peripheral IV upon OT entry to room.    General Precautions: Standard, fall   Orthopedic Precautions:N/A    Braces: N/A  Respiratory Status: Nasal cannula, flow 2 L/min     Occupational Performance:     Bed Mobility:    · Not performed, pt found sitting UIC.     Functional Mobility/Transfers:  · Patient completed Sit <> Stand Transfer from EOB with stand by assistance  with  rolling walker   · Functional Mobility: Pt engaging in functional mobility to simulate household/community distances approx 20ft  with SBA and utilizing RW in order to maximize functional activity tolerance and standing balance required for engagement in occupations of choice.   · Pt on 2L oxygen   · Pt with no LOB and no SOB  · Good safety awareness with AD management     Activities of Daily Living:  · Feeding:  independence sitting UIC       AMPAC 6 Click ADL: 21    Treatment & Education:   Pt educated on role of OT, POC, and goals for therapy.     POC was dicussed with patient/caregiver, who was included in its development and is in agreement with the identified goals and treatment plan.    Patient and family aware of patient's deficits and therapy progression.    Time provided for therapeutic counseling and discussion of health disposition.    Educated on importance of EOB/OOB mobility, maintaining routine, sitting up in chair, and maximizing independence with ADLs during admission    Pt completed ADLs and functional mobility for treatment session as noted above    Pt/caregiver verbalized understanding and expressed no further concerns/questions.   Updated communication board with level of assist required (SBA x 1 person assistance using RW) & educated RN/patient that pt is appropriate for transfers and functional mobility with RN/PCT.     Co-evaluation/treatment performed due to patient's multiple deficits requiring two skilled therapists to appropriately and safely assess patient's strength and endurance while facilitating functional tasks in addition to accommodating for patient's activity tolerance.     Patient left up in chair with  all lines intact, call button in reach and RN notifiedEducation:      GOALS:   Multidisciplinary Problems     Occupational Therapy Goals        Problem: Occupational Therapy    Goal Priority Disciplines Outcome Interventions   Occupational Therapy Goal     OT, PT/OT Ongoing, Progressing    Description: Goals to be met by: 5/12/22     Patient will increase functional independence with ADLs by performing:    UE Dressing with Set-up Assistance.  LE Dressing with Stand-by Assistance.  Grooming while standing at sink with Set-up Assistance.  Toileting from toilet with Modified [place] for hygiene and clothing management.   Supine to sit with Modified [place].  Step transfer with Modified [place]  Toilet transfer to toilet with Modified [place].                     Time Tracking:     OT Date of Treatment: 04/18/22  OT Start Time: 1329  OT Stop Time: 1344  OT Total Time (min): 15 min    Billable Minutes:Therapeutic Activity 15    OT/LONA: OT          4/18/2022

## 2022-04-19 NOTE — SUBJECTIVE & OBJECTIVE
Past Medical History:   Diagnosis Date    CAD (coronary artery disease)     Cancer     colon    CHF (congestive heart failure)     Colitis     Colon cancer     COPD (chronic obstructive pulmonary disease)     Decreased hearing     Diabetes mellitus     Diabetes mellitus, type 2     Hypercholesterolemia     Hypertension     Hypoxemia     Insomnia     Obesity     Osteoarthritis      Past Surgical History:   Procedure Laterality Date    ANGIOGRAM, CORONARY, WITH LEFT HEART CATHETERIZATION N/A 2/10/2022    Procedure: Angiogram, Coronary, with Left Heart Cath;  Surgeon: Cristian Benjamin MD;  Location: [facility] CATH/EP LAB;  Service: Cardiology;  Laterality: N/A;    CARDIAC CATHETERIZATION      CHOLECYSTECTOMY      COLECTOMY      CORONARY ANGIOPLASTY      CORONARY STENT PLACEMENT      EXTERNAL EAR SURGERY      EYE SURGERY      FLEXIBLE SIGMOIDOSCOPY N/A 9/19/2019    Procedure: SIGMOIDOSCOPY, FLEXIBLE;  Surgeon: Biju Kramer III, MD;  Location: [facility] ENDO;  Service: Endoscopy;  Laterality: N/A;    HYSTERECTOMY      partial     Review of patient's allergies indicates:   Allergen Reactions    Iodinated contrast media     Strawberries [strawberry] Hives and Itching       Scheduled Medications:    aspirin  81 mg Oral QAM    atorvastatin  80 mg Oral Daily    cholestyramine  1 packet Oral BID    coenzyme Q10  100 mg Oral QAM    diltiaZEM  120 mg Oral Daily    ergocalciferol  50,000 Units Oral Q7 Days    fluticasone furoate-vilanteroL  1 puff Inhalation Daily    gabapentin  100 mg Oral QHS    guaiFENesin  600 mg Oral BID    isosorbide mononitrate  60 mg Oral Daily    metoprolol succinate  50 mg Oral Daily    pantoprazole  40 mg Oral BID    polyethylene glycol  17 g Oral Daily    ticagrelor  90 mg Oral BID    tiotropium bromide  2 puff Inhalation Daily    torsemide  40 mg Oral Daily       PRN Medications: acetaminophen, albuterol-ipratropium, benzonatate, dextrose 10%, dextrose 10%, diclofenac sodium, glucagon (human  recombinant), glucose, glucose, insulin aspart U-100, loperamide, nitroGLYCERIN, ondansetron, sodium chloride 0.9%, white petrolatum, zolpidem    Family History       Problem Relation (Age of Onset)    Febrile seizures Mother    Heart failure Father          Tobacco Use    Smoking status: Former Smoker     Packs/day: 3.00     Years: 50.00     Pack years: 150.00     Types: Cigarettes     Start date: 3/30/1964     Quit date: 2006     Years since quittin.1    Smokeless tobacco: Never Used    Tobacco comment: ex smoker 15 years   Substance and Sexual Activity    Alcohol use: Yes    Drug use: No    Sexual activity: Never     Review of Systems   Constitutional:  Positive for activity change. Negative for fatigue and fever.   HENT:  Negative for trouble swallowing and voice change.    Eyes:  Negative for photophobia and visual disturbance.   Respiratory:  Positive for shortness of breath. Negative for cough.    Cardiovascular:  Negative for chest pain and palpitations.   Gastrointestinal:  Negative for nausea and vomiting.   Genitourinary:  Negative for difficulty urinating and flank pain.   Musculoskeletal:  Positive for gait problem. Negative for arthralgias.   Skin:  Negative for color change and rash.   Neurological:  Positive for weakness. Negative for speech difficulty.   Psychiatric/Behavioral:  Negative for agitation and confusion. The patient is nervous/anxious.    Objective:     Vital Signs (Most Recent):  Temp: 97 °F (36.1 °C) (22 0816)  Pulse: 79 (22 0833)  Resp: 20 (22 0833)  BP: (!) 159/67 (22 0816)  SpO2: 99 % (22 08)      Vital Signs (24h Range):  Temp:  [97 °F (36.1 °C)-98.5 °F (36.9 °C)] 97 °F (36.1 °C)  Pulse:  [52-83] 79  Resp:  [16-20] 20  SpO2:  [97 %-99 %] 99 %  BP: (107-165)/(51-73) 159/67     Body mass index is 30.71 kg/m².    Physical Exam  Vitals reviewed.   Constitutional:       General: She is not in acute distress.     Appearance: She is  well-developed.      Comments: Sitting EOB    HENT:      Head: Normocephalic and atraumatic.   Eyes:      General:         Right eye: No discharge.         Left eye: No discharge.   Cardiovascular:      Rate and Rhythm: Normal rate.   Pulmonary:      Effort: Pulmonary effort is normal. No respiratory distress.      Comments: On NC   Abdominal:      Palpations: Abdomen is soft.      Tenderness: There is no abdominal tenderness.   Musculoskeletal:         General: No deformity.      Cervical back: Neck supple.   Skin:     General: Skin is warm and dry.   Neurological:      Mental Status: She is alert and oriented to person, place, and time.      Motor: Weakness (non-focal) present.      Comments: Follows commands    Psychiatric:         Mood and Affect: Mood is anxious.         Behavior: Behavior normal. Behavior is cooperative.     NEUROLOGICAL EXAMINATION:     MENTAL STATUS   Oriented to person, place, and time.     Diagnostic Results:   Labs: Reviewed  X-Ray: Reviewed

## 2022-04-19 NOTE — PROGRESS NOTES
[facility] - Cardiology Stepdown  Gastroenterology  Progress Note    Patient Name: Marisol Kerr  MRN: [id number]  Admission Date: 4/8/2022  Hospital Length of Stay: 11 days  Code Status: Full Code   Attending Provider: Kaycee Mitchell MD  Consulting Provider: Karo Wiley DNP  Primary Care Physician: Khadar Dwyer MD  Principal Problem: NSTEMI (non-ST elevated myocardial infarction)      Subjective:   GI initially consulted on 4/12 for dysphagia.    Interval History: Tolerating PO intake well this morning.  Speech was in for eval prior to our visit and they felt she'd be safe to resume a normal diet whilst following recommendations for safe eating habits as previously recommended.  She denies further episodes of regurgitation or dysphagia to me this morning.  Was actively drinking coffee without incident.  Had a arie cracker with speech and did well.  Denies abdominal pain.  Reports remaining upright during meals and for at least 1 hour afterwards and feels this has helped.     Review of Systems   HENT:  Negative for trouble swallowing.    Respiratory:  Positive for cough (improving).    Gastrointestinal:  Negative for abdominal pain, nausea and vomiting.   Objective:     Vital Signs (Most Recent):  Temp: 97 °F (36.1 °C) (04/19/22 0816)  Pulse: 77 (04/19/22 1103)  Resp: 18 (04/19/22 1103)  BP: (!) 159/67 (04/19/22 0816)  SpO2: 99 % (04/19/22 0909)   Vital Signs (24h Range):  Temp:  [97 °F (36.1 °C)-98.5 °F (36.9 °C)] 97 °F (36.1 °C)  Pulse:  [52-83] 77  Resp:  [16-20] 18  SpO2:  [97 %-99 %] 99 %  BP: (107-165)/(51-73) 159/67     Weight: 78.7 kg (173 lb 6.3 oz) (04/10/22 0737)  Body mass index is 30.71 kg/m².      Intake/Output Summary (Last 24 hours) at 4/19/2022 1106  Last data filed at 4/19/2022 0900  Gross per 24 hour   Intake 840 ml   Output --   Net 840 ml       Lines/Drains/Airways       Drain  Duration             Female External Urinary Catheter 03/27/22 2000 22 days              Peripheral  Intravenous Line  Duration                  Peripheral IV - Single Lumen 04/17/22 0200 20 G Anterior;Left;Proximal Forearm 2 days         Peripheral IV - Single Lumen 04/18/22 1349 20 G;1 3/4 in Right Forearm <1 day                    Physical Exam  Vitals reviewed.   Constitutional:       General: She is not in acute distress.     Appearance: She is obese.   HENT:      Head: Normocephalic and atraumatic.      Nose:      Comments: Nasal cannula in place  Eyes:      Extraocular Movements: Extraocular movements intact.   Pulmonary:      Effort: Pulmonary effort is normal. No respiratory distress.   Skin:     General: Skin is warm and dry.      Capillary Refill: Capillary refill takes less than 2 seconds.   Neurological:      General: No focal deficit present.      Mental Status: She is alert and oriented to person, place, and time. Mental status is at baseline.   Psychiatric:         Mood and Affect: Mood normal.         Behavior: Behavior normal.         Thought Content: Thought content normal.       Significant Labs:  CBC:   Recent Labs   Lab 04/18/22  0236 04/19/22  0443   WBC 11.91 12.49   HGB 10.2* 10.0*   HCT 31.7* 32.6*    210     CMP:   Recent Labs   Lab 04/19/22  0443   *   CALCIUM 9.1   ALBUMIN 2.7*   PROT 6.1      K 3.8   CO2 30*      BUN 56*   CREATININE 2.1*   ALKPHOS 68   ALT 16   AST 17   BILITOT 0.3     Coagulation: No results for input(s): PT, INR, APTT in the last 48 hours.  All pertinent lab results from the last 24 hours have been reviewed.      Significant Imaging:  Imaging results within the past 24 hours have been reviewed.    Assessment/Plan:     Esophageal dysmotility  Marisol Kerr is a 76 y.o. female with history of DM, HTN, HLD, CKD, who was admitted for chest pain from rehab. GI consulted for dysphagia.     The patient developed dysphagia over the past 2-3 days, associated with cough, nausea and vomiting.  It is unlikely that she has mechanical obstruction that  developed over this short of a time, unfortunately she is not a good candidate at this time for undergoing EGD requiring anesthesia.  Would recommend further evaluation of cough per primary team.  She might also benefit from antiemetics.    4/19: no issues of dysphagia or regurgitation reported this AM.  Seen by Speech therapy prior to our visit and was tolerating coffee and arie cracker without incident.  Remaining upright during meals and for at least 1 hour afterwards.  Speech feels she would be able to tolerate a full diet, I agree.  Denies abd pain.  Esophagram 4/14 noting esophageal dysmotility.  Has been started on Diltiazem 120 mg to see if this is beneficial.     Recommendations:  - not a good candidate for undergoing EGD with anesthesia at this time due to cardiac co-morbidities  - antiemetics PRN  - ok for full diet from GI standpoint.  - Continue safe eating habits indefinitely:      1. Eat upright at 90 deg and remain upright at least 30 min -1 hour post meal.     2. Chew food thoroughly     3.  One bite at a time     4. Sips of water in between bites     5. Limit other distractions during meals (avoid talking, turning, etc).  - f/u Speech and dietitian recommendations   - May follow-up with GI in OP setting, could consider EGD in the future pending cardiac status and symptom progress.  - please notify GI with any acute changes in patient's clinical status        Thank you for your consult. I will sign off. Please contact us if you have any additional questions.    Karo Wiley, LORI  Gastroenterology  [facility] - Cardiology Stepdown

## 2022-04-19 NOTE — PT/OT/SLP PROGRESS
"Speech Language Pathology Treatment    Patient Name:  Marisol Kerr   MRN:  [id number]  Admitting Diagnosis: NSTEMI (non-ST elevated myocardial infarction)    Recommendations:                 General Recommendations:  check diet tolerance x1-2  Diet recommendations:  Regular, Liquid Diet Level: Thin   Aspiration Precautions: 1 bite/sip at a time, Alternating bites/sips, Avoid talking while eating, HOB to 90 degrees, Meds whole 1 at a time, Monitor for s/s of aspiration, Remain upright 30 minutes post meal, Small bites/sips and Strict aspiration precautions   General Precautions: Standard, aspiration, fall  Communication strategies:  pt appears mildly [place]    Subjective     "I don't like the meat all ground up." pt commented regarding mechanical soft diet.    Pain/Comfort:  · Pain Rating 1: 0/10    Respiratory Status: Nasal cannula, flow 2 L/min    Objective:     Has the patient been evaluated by SLP for swallowing?   Yes  Keep patient NPO? No   Current Respiratory Status:        Pt seen for follow up to monitor diet tolerance after undergoing esophagram on 4/16, which revealed esophageal dysmotility.  It was noted that pt is currently receiving a mechanical soft diet.  SLP's most recent diet recommendations were for regular solids.  Pt observed trials of solids while wearing dentures on this service date.  She was able to manage bites of arie cracker x 4 and cup sips of coffee x 3.  Oral and pharyngeal phases of the swallow appeared WFL.  No overt s/s of aspiration observed.  Education provided to pt regarding results of esophagram, compensatory strategies for increasing safety and efficiency during meals, and aspiration and reflux precautions, and SLP treatment plan and POC.  Pt expressed understanding.     Assessment:     Marisol Kerr is a 76 y.o. female.  Oral and pharyngeal phases of the swallow appear WFL.  Esophageal dysphagia (dysmotility) revealed on recent esophagram.  Recommend regular consistency diet and " thin liquids while following aspiration and reflux precautions.  SLP to follow up 1-2x to ensure diet tolerance.       Goals:   Multidisciplinary Problems     SLP Goals        Problem: SLP    Goal Priority Disciplines Outcome   SLP Goal     SLP Ongoing, Progressing   Description: Speech Language Pathology Goals  Goals expected to be met by 4/19:  1. Pt will participate in ongoing assessment of swallow function given advanced textures to determine safest and least restrictive diet.              Multidisciplinary Problems (Resolved)        Problem: SLP    Goal Priority Disciplines Outcome   SLP Goal   (Resolved)     SLP Met                   Plan:     · Patient to be seen:  3 x/week   · Plan of Care expires:     · Plan of Care reviewed with:  patient   · SLP Follow-Up:  Yes       Discharge recommendations:   (likely no ST needs post d/c)     Time Tracking:     SLP Treatment Date:   04/19/22  Speech Start Time:  1017  Speech Stop Time:  1036     Speech Total Time (min):  19 min    Billable Minutes: Treatment Swallowing Dysfunction 11 and Self Care/Home Management Training 8    04/19/2022

## 2022-04-19 NOTE — PLAN OF CARE
Goals and plan of care reviewed and mutually agreed upon including: continue treatments as ordered, increase diet and activity as tolerated, patient encouraged to call for assistance with toileting rather than incontinence to decrease further skin breakdown, agreeable, remain fever/fall/pain free. All topics educated on, verbalized understanding, no further questions at this time. Bed in lowest and locked position, all belongings and call bell in reach.

## 2022-04-19 NOTE — HOSPITAL COURSE
4/14: Oral and pharyngeal phases of the swallow appear WFL.  Passed bedside swallow evaluation.  SLP recommending regular diet and thin liquids. PT-Sit to stand SBA & RW. Ambulated 3 ft fws/bwd CGA-SBA & RW.   04/18/2022:  OT-Sit to stand SBA & RW.  Feed (I).

## 2022-04-19 NOTE — ASSESSMENT & PLAN NOTE
- esophogram done 4/14 showed esophageal dysmotility characterized by diminished secondary waves and presence of tertiary contractions.   - possible that dysmotility is related to increased contractility vs spasm  - patient started on diltiazem w/ improvement in symptoms

## 2022-04-19 NOTE — ASSESSMENT & PLAN NOTE
Has persistent cough and increased sputum production. Not infectious appearing, afebrile and without leukocytosis. However, CXR w/ increased RLL and RML infiltrates. No improvement of symptoms w/ symptomatic treatment, thus will treat for HAP w/ Zosyn. Additionally, concerned for microaspiration given patient continues to regurgitate food - SLP consulted, appreciate recommendations. Consulted GI and SLP and will proceed with esophageal evaluation for dysphagia and globus sensation.   - s/p 7 day course of antibiotic therapy.

## 2022-04-19 NOTE — PT/OT/SLP PROGRESS
Physical Therapy Treatment    Patient Name:  Marisol Kerr   MRN:  [id number]  Admit Date: 2022  Admitting Diagnosis:  NSTEMI (non-ST elevated myocardial infarction)   Length of Stay: 11 days  Recent Surgery: Procedure(s) (LRB):  EGD (ESOPHAGOGASTRODUODENOSCOPY) (N/A) 5 Days Post-Op    Recommendations:     Discharge Recommendations:  home health PT   Discharge Equipment Recommendations: none   Barriers to discharge: None    Plan:     During this hospitalization, patient to be seen 3 x/week to address the listed problems via gait training, therapeutic activities, therapeutic exercises, neuromuscular re-education  · Plan of Care Expires:  05/10/22   Plan of Care Reviewed with: patient    Assessment:     Marisol Kerr is a 76 y.o. female admitted with a medical diagnosis of NSTEMI (non-ST elevated myocardial infarction).       Problem List: impaired endurance, weakness, impaired functional mobilty, impaired cardiopulmonary response to activity.  Rehab Prognosis: Good     GOALS:   Multidisciplinary Problems     Physical Therapy Goals        Problem: Physical Therapy    Goal Priority Disciplines Outcome Goal Variances Interventions   Physical Therapy Goal     PT, PT/OT Ongoing, Progressing     Description: Goals to be met by: 2022    Patient will increase functional independence with mobility by performin. Gait  x 25 feet with Supervision using Rolling Walker.   2. Stand for 3 minutes with Supervision using Rolling Walker  3. Lower extremity exercise program x15 reps per handout, with independence                   Subjective   Communicated with RN prior to session.  Patient found up in chair upon PT entry to room, agreeable to evaluation. Marisol Kerr's alone during session.    Chief Complaint: none reported   Patient/Family Comments/goals: to return home   Pain/Comfort:  · Pain Rating 1: 0/10  · Pain Rating Post-Intervention 1: 0/10    Objective:   Patient found with: telemetry, peripheral IV, PureWick,  "oxygen   General Precautions: Standard, Cardiac fall   Orthopedic Precautions:N/A   Braces: N/A   Oxygen Device: Nasal Cannula  Vitals: BP (!) 117/58 (BP Location: Left arm, Patient Position: Sitting)   Pulse 66   Temp 97.4 °F (36.3 °C) (Tympanic)   Resp 18   Ht 5' 3" (1.6 m)   Wt 78.7 kg (173 lb 6.3 oz)   SpO2 99%   Breastfeeding No   BMI 30.71 kg/m²     Outcome Measures:  AM-PAC 6 CLICK MOBILITY  Turning over in bed (including adjusting bedclothes, sheets and blankets)?: 4  Sitting down on and standing up from a chair with arms (e.g., wheelchair, bedside commode, etc.): 4  Moving from lying on back to sitting on the side of the bed?: 4  Moving to and from a bed to a chair (including a wheelchair)?: 4  Need to walk in hospital room?: 3  Climbing 3-5 steps with a railing?: 2  Basic Mobility Total Score: 21   Functional Mobility:  Additional staff present: N/A  Bed Mobility:   Not performed 2nd to pt found in chair     Transfers:    Sit <> Stand Transfer: supervision with rolling walker from chair       Gait:  · Patient ambulated: 20ft    · Patient required: standy by assistance  · Patient used: rolling walker  · Gait Pattern observed: reciprocal gait  · Gait Deviation(s): decreased step length and decreased esther  · Impairments due to: decreased endurance  · Comments:   · Pt utilizes B UE strength to compensate for impaired balance and impaired B LE strength  · Verbal cuing for pacing   · No LOB  · Slight SOB - baseline with activity   · 2L O2 present      Therapeutic Activities, Exercises, and Education:   Educated pt on PT role/POC  Educated pt on importance of OOB activity and daily ambulation   Pt verbalized understanding    Patient left up in chair with all lines intact, call button in reach and RN notified..    Time Tracking:     PT Received On: 04/19/22  PT Start Time: 1415     PT Stop Time: 1445  PT Total Time (min): 30 min       Billable Minutes:   · Gait Training 23    Treatment Type: " Treatment  PT/PTA: PT

## 2022-04-20 LAB
ALBUMIN SERPL BCP-MCNC: 2.7 G/DL (ref 3.5–5.2)
ALP SERPL-CCNC: 82 U/L (ref 55–135)
ALT SERPL W/O P-5'-P-CCNC: 17 U/L (ref 10–44)
ANION GAP SERPL CALC-SCNC: 13 MMOL/L (ref 8–16)
ANISOCYTOSIS BLD QL SMEAR: SLIGHT
AST SERPL-CCNC: 16 U/L (ref 10–40)
BASOPHILS # BLD AUTO: 0.04 K/UL (ref 0–0.2)
BASOPHILS NFR BLD: 0.3 % (ref 0–1.9)
BILIRUB SERPL-MCNC: 0.2 MG/DL (ref 0.1–1)
BUN SERPL-MCNC: 64 MG/DL (ref 8–23)
CALCIUM SERPL-MCNC: 8.9 MG/DL (ref 8.7–10.5)
CHLORIDE SERPL-SCNC: 101 MMOL/L (ref 95–110)
CO2 SERPL-SCNC: 29 MMOL/L (ref 23–29)
CREAT SERPL-MCNC: 2.4 MG/DL (ref 0.5–1.4)
DIFFERENTIAL METHOD: ABNORMAL
EOSINOPHIL # BLD AUTO: 0 K/UL (ref 0–0.5)
EOSINOPHIL NFR BLD: 0.3 % (ref 0–8)
ERYTHROCYTE [DISTWIDTH] IN BLOOD BY AUTOMATED COUNT: 13 % (ref 11.5–14.5)
EST. GFR  (AFRICAN AMERICAN): 22.3 ML/MIN/1.73 M^2
EST. GFR  (NON AFRICAN AMERICAN): 19.3 ML/MIN/1.73 M^2
FERRITIN SERPL-MCNC: 266 NG/ML (ref 20–300)
GIANT PLATELETS BLD QL SMEAR: ABNORMAL
GLUCOSE SERPL-MCNC: 137 MG/DL (ref 70–110)
HCT VFR BLD AUTO: 31.4 % (ref 37–48.5)
HGB BLD-MCNC: 9.8 G/DL (ref 12–16)
IMM GRANULOCYTES # BLD AUTO: 0.16 K/UL (ref 0–0.04)
IMM GRANULOCYTES NFR BLD AUTO: 1.2 % (ref 0–0.5)
IRON SERPL-MCNC: 88 UG/DL (ref 30–160)
LYMPHOCYTES # BLD AUTO: 1.4 K/UL (ref 1–4.8)
LYMPHOCYTES NFR BLD: 10.1 % (ref 18–48)
MAGNESIUM SERPL-MCNC: 1.8 MG/DL (ref 1.6–2.6)
MCH RBC QN AUTO: 28.2 PG (ref 27–31)
MCHC RBC AUTO-ENTMCNC: 31.2 G/DL (ref 32–36)
MCV RBC AUTO: 90 FL (ref 82–98)
MONOCYTES # BLD AUTO: 0.7 K/UL (ref 0.3–1)
MONOCYTES NFR BLD: 5 % (ref 4–15)
NEUTROPHILS # BLD AUTO: 11.2 K/UL (ref 1.8–7.7)
NEUTROPHILS NFR BLD: 83.1 % (ref 38–73)
NRBC BLD-RTO: 0 /100 WBC
OVALOCYTES BLD QL SMEAR: ABNORMAL
PLATELET # BLD AUTO: 200 K/UL (ref 150–450)
PLATELET BLD QL SMEAR: ABNORMAL
PMV BLD AUTO: 11.8 FL (ref 9.2–12.9)
POCT GLUCOSE: 119 MG/DL (ref 70–110)
POCT GLUCOSE: 143 MG/DL (ref 70–110)
POCT GLUCOSE: 212 MG/DL (ref 70–110)
POIKILOCYTOSIS BLD QL SMEAR: SLIGHT
POTASSIUM SERPL-SCNC: 4 MMOL/L (ref 3.5–5.1)
PROT SERPL-MCNC: 6.1 G/DL (ref 6–8.4)
RBC # BLD AUTO: 3.48 M/UL (ref 4–5.4)
SATURATED IRON: 35 % (ref 20–50)
SODIUM SERPL-SCNC: 143 MMOL/L (ref 136–145)
TOTAL IRON BINDING CAPACITY: 253 UG/DL (ref 250–450)
TRANSFERRIN SERPL-MCNC: 171 MG/DL (ref 200–375)
URATE SERPL-MCNC: 12.7 MG/DL (ref 2.4–5.7)
WBC # BLD AUTO: 13.5 K/UL (ref 3.9–12.7)

## 2022-04-20 PROCEDURE — 63600175 PHARM REV CODE 636 W HCPCS: Performed by: STUDENT IN AN ORGANIZED HEALTH CARE EDUCATION/TRAINING PROGRAM

## 2022-04-20 PROCEDURE — 36415 COLL VENOUS BLD VENIPUNCTURE: CPT | Performed by: INTERNAL MEDICINE

## 2022-04-20 PROCEDURE — 27000221 HC OXYGEN, UP TO 24 HOURS

## 2022-04-20 PROCEDURE — 82728 ASSAY OF FERRITIN: CPT

## 2022-04-20 PROCEDURE — 99231 SBSQ HOSP IP/OBS SF/LOW 25: CPT | Mod: GC,,, | Performed by: INTERNAL MEDICINE

## 2022-04-20 PROCEDURE — 84550 ASSAY OF BLOOD/URIC ACID: CPT

## 2022-04-20 PROCEDURE — 97530 THERAPEUTIC ACTIVITIES: CPT

## 2022-04-20 PROCEDURE — 99231 PR SUBSEQUENT HOSPITAL CARE,LEVL I: ICD-10-PCS | Mod: GC,,, | Performed by: INTERNAL MEDICINE

## 2022-04-20 PROCEDURE — 84466 ASSAY OF TRANSFERRIN: CPT

## 2022-04-20 PROCEDURE — 94640 AIRWAY INHALATION TREATMENT: CPT

## 2022-04-20 PROCEDURE — 94761 N-INVAS EAR/PLS OXIMETRY MLT: CPT

## 2022-04-20 PROCEDURE — 25000242 PHARM REV CODE 250 ALT 637 W/ HCPCS: Performed by: INTERNAL MEDICINE

## 2022-04-20 PROCEDURE — 25000003 PHARM REV CODE 250: Performed by: INTERNAL MEDICINE

## 2022-04-20 PROCEDURE — 99900035 HC TECH TIME PER 15 MIN (STAT)

## 2022-04-20 PROCEDURE — 97535 SELF CARE MNGMENT TRAINING: CPT

## 2022-04-20 PROCEDURE — 83735 ASSAY OF MAGNESIUM: CPT | Performed by: INTERNAL MEDICINE

## 2022-04-20 PROCEDURE — 92526 ORAL FUNCTION THERAPY: CPT

## 2022-04-20 PROCEDURE — 20600001 HC STEP DOWN PRIVATE ROOM

## 2022-04-20 PROCEDURE — 85025 COMPLETE CBC W/AUTO DIFF WBC: CPT

## 2022-04-20 PROCEDURE — 80053 COMPREHEN METABOLIC PANEL: CPT

## 2022-04-20 PROCEDURE — 25000003 PHARM REV CODE 250

## 2022-04-20 RX ORDER — MAGNESIUM SULFATE HEPTAHYDRATE 40 MG/ML
2 INJECTION, SOLUTION INTRAVENOUS ONCE
Status: COMPLETED | OUTPATIENT
Start: 2022-04-20 | End: 2022-04-20

## 2022-04-20 RX ADMIN — HEPARIN SODIUM 5000 UNITS: 5000 INJECTION INTRAVENOUS; SUBCUTANEOUS at 10:04

## 2022-04-20 RX ADMIN — GABAPENTIN 100 MG: 100 CAPSULE ORAL at 08:04

## 2022-04-20 RX ADMIN — MAGNESIUM SULFATE 2 G: 2 INJECTION INTRAVENOUS at 08:04

## 2022-04-20 RX ADMIN — PANTOPRAZOLE SODIUM 40 MG: 40 TABLET, DELAYED RELEASE ORAL at 08:04

## 2022-04-20 RX ADMIN — METOPROLOL SUCCINATE 50 MG: 50 TABLET, EXTENDED RELEASE ORAL at 08:04

## 2022-04-20 RX ADMIN — DILTIAZEM HYDROCHLORIDE 120 MG: 120 CAPSULE, COATED, EXTENDED RELEASE ORAL at 08:04

## 2022-04-20 RX ADMIN — TICAGRELOR 90 MG: 90 TABLET ORAL at 08:04

## 2022-04-20 RX ADMIN — HEPARIN SODIUM 5000 UNITS: 5000 INJECTION INTRAVENOUS; SUBCUTANEOUS at 01:04

## 2022-04-20 RX ADMIN — HEPARIN SODIUM 5000 UNITS: 5000 INJECTION INTRAVENOUS; SUBCUTANEOUS at 05:04

## 2022-04-20 RX ADMIN — ASPIRIN 81 MG: 81 TABLET, COATED ORAL at 08:04

## 2022-04-20 RX ADMIN — Medication 100 MG: at 08:04

## 2022-04-20 RX ADMIN — ISOSORBIDE MONONITRATE 60 MG: 60 TABLET, EXTENDED RELEASE ORAL at 08:04

## 2022-04-20 RX ADMIN — TIOTROPIUM BROMIDE INHALATION SPRAY 2 PUFF: 3.12 SPRAY, METERED RESPIRATORY (INHALATION) at 08:04

## 2022-04-20 RX ADMIN — GUAIFENESIN 600 MG: 600 TABLET, EXTENDED RELEASE ORAL at 08:04

## 2022-04-20 RX ADMIN — FLUTICASONE FUROATE AND VILANTEROL TRIFENATATE 1 PUFF: 100; 25 POWDER RESPIRATORY (INHALATION) at 08:04

## 2022-04-20 RX ADMIN — ATORVASTATIN CALCIUM 80 MG: 20 TABLET, FILM COATED ORAL at 08:04

## 2022-04-20 NOTE — PROGRESS NOTES
[facility]  Cardiology  Progress Note    Patient Name: Marisol Kerr  MRN: [id number]  Admission Date: 4/8/2022  Hospital Length of Stay: 12 days  Code Status: Full Code   Attending Physician: Kaycee Mitchell MD   Primary Care Physician: Khadar Dwyer MD  Expected Discharge Date: 4/21/2022  Principal Problem:NSTEMI (non-ST elevated myocardial infarction)    Subjective:     Hospital Course:   Patient was asymptomatic on DAPT and Heparin, however, heparin was discontinued on arrival in the setting of known history of rectus sheath hematoma which occurred as a post-C complication. Patient originally scheduled to undergo high risk PCI whilst inpatient w/ Dr. Chappell but as per interventional cardiology, this will now take place 4/17/2022 in the outpatient setting. Of note, patient has developed worsening cough w/ increased clear sputum production and pleuritic chest pain. She remains afebrile and overall non infectious appearing. Repeat CXR w/ increased RML and RLL infiltrates. Patient's cough has not improved w/ symptomatic treatment. Given her high risk of decompensation, we will initiate HAP treatment w/ Zosyn. Additionally, concern for microaspiration 2/2 ?oropharyngeal dysphasia vs globus sensation - evaluated by SLP and GI evaluation recommended. Esophogram done 4/14/22 shows esophageal dysmotility characterized by dimished secondary waves and presence of tertiary contractions. Cr 1.4 --> 2.0; likely pre-renal in the setting of decreased intake. Will administer IVFs PRN and continue to monitor. Now improving. Anti-hypertensives held in the setting of borderline hypotension. Was ready for discharge other than mild increase in creatinine on 4/20. Holding on diuretics to monitor, but likely close to d/c.       Interval History: No acute events overnight. Patient's diarrhea improved w/ cholestyramine. Optimize BP control w/ PO anti-hypertensive's. Plan for discharge pending PM&R's evaluation  for dispo w/ home PT/OT vs rehab.     Review of Systems   Constitutional: Negative for chills, decreased appetite, diaphoresis, fever, malaise/fatigue and weight gain.   HENT: Negative.  Negative for congestion and sore throat.    Eyes:  Negative for blurred vision and double vision.   Cardiovascular:  Negative for chest pain, dyspnea on exertion, irregular heartbeat, leg swelling and palpitations.   Respiratory:  Positive for cough. Negative for hemoptysis, shortness of breath and wheezing.    Hematologic/Lymphatic: Positive for bleeding problem. Bruises/bleeds easily.   Skin: Negative.  Negative for itching and rash.   Musculoskeletal:  Positive for arthritis. Negative for falls and joint pain.   Gastrointestinal:  Positive for nausea and vomiting. Negative for bloating, abdominal pain, constipation, diarrhea and melena.   Genitourinary: Negative.  Negative for hematuria and urgency.   Neurological:  Negative for dizziness, headaches, light-headedness and numbness.   Psychiatric/Behavioral: Negative.  Negative for altered mental status and depression.    Objective:     Vital Signs (Most Recent):  Temp: 97 °F (36.1 °C) (04/19/22 0816)  Pulse: 79 (04/19/22 0833)  Resp: 20 (04/19/22 0833)  BP: (!) 159/67 (04/19/22 0816)  SpO2: 99 % (04/19/22 0909)   Vital Signs (24h Range):  Temp:  [97 °F (36.1 °C)-98.5 °F (36.9 °C)] 97 °F (36.1 °C)  Pulse:  [52-83] 79  Resp:  [16-20] 20  SpO2:  [97 %-99 %] 99 %  BP: (107-165)/(51-73) 159/67     Weight: 78.7 kg (173 lb 6.3 oz)  Body mass index is 30.71 kg/m².     SpO2: 99 %  O2 Device (Oxygen Therapy): nasal cannula      Intake/Output Summary (Last 24 hours) at 4/19/2022 1056  Last data filed at 4/19/2022 0900  Gross per 24 hour   Intake 840 ml   Output --   Net 840 ml       Lines/Drains/Airways       Drain  Duration             Female External Urinary Catheter 03/27/22 2000 22 days              Peripheral Intravenous Line  Duration                  Peripheral IV - Single Lumen  04/17/22 0200 20 G Anterior;Left;Proximal Forearm 2 days         Peripheral IV - Single Lumen 04/18/22 1349 20 G;1 3/4 in Right Forearm <1 day                    Physical Exam  Vitals and nursing note reviewed.   Constitutional:       Appearance: Normal appearance.   HENT:      Head: Normocephalic and atraumatic.      Nose: Nose normal.      Mouth/Throat:      Mouth: Mucous membranes are moist.      Pharynx: Oropharynx is clear.      Comments: Oozing blister on bottom lip   Eyes:      Extraocular Movements: Extraocular movements intact.   Cardiovascular:      Rate and Rhythm: Normal rate and regular rhythm.      Pulses: Normal pulses.      Heart sounds: Normal heart sounds.   Pulmonary:      Effort: Pulmonary effort is normal.      Breath sounds: Normal breath sounds. No wheezing or rales.   Abdominal:      General: Bowel sounds are normal.      Palpations: Abdomen is soft.      Tenderness: There is no abdominal tenderness.      Hernia: A hernia (R hernia abdominal hernia, reducable, non-painful) is present.   Musculoskeletal:         General: Normal range of motion.      Cervical back: Normal range of motion and neck supple.      Right lower leg: No edema.      Left lower leg: No edema.   Skin:     General: Skin is warm and dry.   Neurological:      General: No focal deficit present.      Mental Status: She is alert and oriented to person, place, and time.   Psychiatric:         Mood and Affect: Mood normal.         Behavior: Behavior normal.       Significant Labs: CMP   Recent Labs   Lab 04/18/22 0236 04/19/22  0443    145   K 3.7 3.8   CL 99 102   CO2 28 30*   * 118*   BUN 52* 56*   CREATININE 2.3* 2.1*   CALCIUM 8.9 9.1   PROT 6.3 6.1   ALBUMIN 2.6* 2.7*   BILITOT 0.4 0.3   ALKPHOS 63 68   AST 22 17   ALT 13 16   ANIONGAP 13 13   ESTGFRAFRICA 23.4* 26.2*   EGFRNONAA 20.3* 22.7*    and CBC   Recent Labs   Lab 04/18/22 0236 04/19/22  0443   WBC 11.91 12.49   HGB 10.2* 10.0*   HCT 31.7* 32.6*   PLT  199 210       Significant Imaging: Echocardiogram: Transthoracic echo (TTE) complete ([facility] Only):   Results for orders placed or performed during the hospital encounter of 04/08/22   Echo   Result Value Ref Range    LVIDd 3.37 (A) 3.5 - 6.0 cm    LVIDs 2.36 2.1 - 4.0 cm    LV Diastolic Volume 46.49 mL    LV Systolic Volume 19.23 mL    FS 30 %    LV Systolic Volume Index 10.6 mL/m2    LV Diastolic Volume Index 25.54 mL/m2    BSA 1.87 m2    EF 60 %    Narrative    · Limited study for wall morgan. several off axis views.  · The estimated ejection fraction is 60%.  · The left ventricle is normal in size with normal systolic function.  · The following segments are hypokinetic: basal inferoseptal and basal   inferior. All other segments are normal.  · Normal right ventricular size with normal right ventricular systolic   function.        Assessment and Plan:       * NSTEMI (non-ST elevated myocardial infarction)  Admitted with acute onset chest pressure, EKG with lateral ST depressions, and troponin 0.06 (mildly elevated) all consistent with NSTEMI. Currently stable and asymptomatic. 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 Outpt plan to follow-up with Dr Chappell for high risk PCI. Troponin peaked at 0.07.     - On ASA/Brilinta. Heparin held from admission due to prior spontaneous rectus sheath hematoma s/p L heart cath.   - Continue atorvastatin, metoprolol  - SL Nitro PRN for chest pain  - patient planned to have high risk PCI w/ Dr. Chappell, will plan for outpatient.     Impaired functional mobility and endurance  - PT/OT consulted, appreciate recs    Gout  - L great toe pain clinically consistent w/ gout. S/p 5 day prednisone 40mg course given patient's poor kidney function.     Esophageal dysmotility  - esophogram done 4/14 showed esophageal dysmotility characterized by diminished secondary waves and presence of tertiary contractions.   - possible that dysmotility  is related to increased contractility vs spasm  - patient started on diltiazem w/ improvement in symptoms     HAP (hospital-acquired pneumonia)  Has persistent cough and increased sputum production. Not infectious appearing, afebrile and without leukocytosis. However, CXR w/ increased RLL and RML infiltrates. No improvement of symptoms w/ symptomatic treatment, thus will treat for HAP w/ Zosyn. Additionally, concerned for microaspiration given patient continues to regurgitate food - SLP consulted, appreciate recommendations. Consulted GI and SLP and will proceed with esophageal evaluation for dysphagia and globus sensation.   - s/p 7 day course of antibiotic therapy.       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   No s/s bleeding and on DAPT.     - Continue home ferrous sulfate  - Type and screen for potential procedure  - Trend CBC daily  - Transfuse to maintain Hb >8 with CAD    Leukocytosis  - leukocytosis 4/17: patient does not appear to be infectious. Does endorse 5-7 episodes of watery diarrhea/day x 2 days. Low suspicion for C diff. Will continue patient's home cholestyramine; symptoms improved    Chronic diastolic congestive heart failure  - TTE on 04/09 with EF 60%, hypokinetic basal inferoseptal and inferior hypokinesis.  - IV lasix was converted to torsemide. Likely overdiuresed. Holding torsemide for now  - Daily weights, strict I/Os  - Start SLGT2 at discharge        Chronic hypoxemic respiratory failure  On NC at home and underlying COPD. No s/s exacerbation.  - Continue home inhaler regimen  - Albuterol nebs PRN  - Supplemental oxygen w/ goal SpO2 88-92%      CKD (chronic kidney disease), stage III  - patient w/ temporarily worsening Cr thought to be pre-renal in etiology. Baseline sCr ~1.3. However, patient appears volume overloaded on examination; endorses cough, orthopnea and questionable PND.CXR  consistent w/ pulmonary edema. , likely falsely lowered in the setting of obesity. High suspicion for cardiorenal syndrome, thus patient diuresed w/ IV lasix. Symptoms improved.  - IV lasix transitioned to torsemide 40mg QD and appears to be getting dryer  - Hold torsemide for now, continue to trend renal fxn  - Avoid nephrotoxins  - renally dose medications  - Daily BMP to monitor electrolytes and Cr    Gastroesophageal reflux disease without esophagitis  - Continue home PPI  - low suspicion for candidiasis, discontinue fluconazole.     Essential hypertension, benign  - continue home antihypertensives as tolerated        VTE Risk Mitigation (From admission, onward)         Ordered     heparin (porcine) injection 5,000 Units  Every 8 hours         04/19/22 1627     IP VTE HIGH RISK PATIENT  Once         04/08/22 2203     Place sequential compression device  Until discontinued         04/08/22 2203                Connor M Gillies, MD  Cardiology  [facility]

## 2022-04-20 NOTE — PT/OT/SLP PROGRESS
Occupational Therapy   Treatment    Name: Marisol Kerr  MRN: [id number]  Admitting Diagnosis:  NSTEMI (non-ST elevated myocardial infarction)  6 Days Post-Op    Recommendations:     Discharge Recommendations: home health PT, home health OT  Discharge Equipment Recommendations:  none  Barriers to discharge:  None    Assessment:     Marisol Kerr is a 76 y.o. female with a medical diagnosis of NSTEMI (non-ST elevated myocardial infarction).  She presents with performance deficits affecting function are weakness, impaired endurance, impaired self care skills, impaired functional mobilty, impaired cardiopulmonary response to activity. Pt tolerated session well but declined functional mobility due to arrival of breakfast. Pt was found sitting EOB,eating breakfast and all questions/concenrs answered within OT scope of practice. Pt would benefit from continued skilled acute OT services in order to maximize independence and safety with ADLs and functional mobility to ensure safe return to PLOF in the least restrictive environment. OT recommending HH once pt is medically appropriate for d/c.     Rehab Prognosis:  Good; patient would benefit from acute skilled OT services to address these deficits and reach maximum level of function.       Plan:     Patient to be seen 3 x/week to address the above listed problems via self-care/home management, therapeutic activities, therapeutic exercises  · Plan of Care Expires: 04/29/22  · Plan of Care Reviewed with: patient    Subjective     Pain/Comfort:  · Pain Rating 1: 0/10  · Pain Rating Post-Intervention 1: 0/10    Objective:     Communicated with: RN prior to session.  Patient found sitting edge of bed with telemetry, peripheral IV, PureWick, oxygen upon OT entry to room. Pt agreeable to therapy session    General Precautions: Standard, fall   Orthopedic Precautions:N/A   Braces: N/A  Respiratory Status: Nasal cannula, flow 2 L/min     Occupational Performance:     Bed Mobility:     · Not observed, pt reported no concerns with bed mobility     Functional Mobility/Transfers:  · Patient completed Sit <> Stand Transfer from EOB with supervision  with  rolling walker   · Functional Mobility: Pt declined due to arrival of breakfast     Activities of Daily Living:  · Feeding:  independence sitting EOB     [facility] 6 Click ADL: 21    Treatment & Education:   Pt educated on role of OT, POC, and goals for therapy.     POC was dicussed with patient/caregiver, who was included in its development and is in agreement with the identified goals and treatment plan.    Increased time prov   Pt educated on DME recs for safety within the home environment and assistance upon d/c.    Pt owns all necessary DME for d/c home.    Time provided for therapeutic counseling and discussion of health disposition.    Educated on importance of EOB/OOB mobility, maintaining routine, sitting up in chair, and maximizing independence with ADLs during admission    Pt completed ADLs and functional mobility for treatment session as noted above    Pt/caregiver verbalized understanding and expressed no further concerns/questions.   Updated communication board    Patient left sitting edge of bed with all lines intact, call button in reach and RN  notifiedEducation:      GOALS:   Multidisciplinary Problems     Occupational Therapy Goals        Problem: Occupational Therapy    Goal Priority Disciplines Outcome Interventions   Occupational Therapy Goal     OT, PT/OT Ongoing, Progressing    Description: Goals to be met by: 5/12/22     Patient will increase functional independence with ADLs by performing:    UE Dressing with Set-up Assistance.  LE Dressing with Stand-by Assistance.  Grooming while standing at sink with Set-up Assistance.  Toileting from toilet with Modified [place] for hygiene and clothing management.   Supine to sit with Modified [place].  Step transfer with Modified [place]  Toilet transfer to  toilet with Modified [place].                     Time Tracking:     OT Date of Treatment: 04/20/22  OT Start Time: 0818  OT Stop Time: 0827  OT Total Time (min): 9 min    Billable Minutes:Therapeutic Activity 9    OT/LONA: OT          4/20/2022

## 2022-04-20 NOTE — PT/OT/SLP PROGRESS
"Speech Language Pathology Treatment    Patient Name:  Marisol Kerr   MRN:  [id number]  Admitting Diagnosis: NSTEMI (non-ST elevated myocardial infarction)    Recommendations:                 General Recommendations:  Follow-up not indicated  Diet recommendations:  Regular, Liquid Diet Level: Thin   Aspiration Precautions:  1 bite/sip at a time, Eliminate distractions, HOB to 90 degrees, Remain upright 30 minutes post meal, Small bites/sips and Strict aspiration precautions   General Precautions: Standard, aspiration, fall  Communication strategies:  pt appears [employer]    Subjective     "As long as you don't stare too hard." pt joked when SLP asked if she could observe her during intake of meal.     Pain/Comfort:  ·      Respiratory Status: Nasal cannula, flow 2 L/min    Objective:     Has the patient been evaluated by SLP for swallowing?   Yes  Keep patient NPO? No   Current Respiratory Status:        Pt's diet changed to regular solids this morning and pt receive regular consistency lunch tray. SLP observed pt consuming bites of pork loin and straw sips of Diet Coke.  Oral and pharyngeal phases of the swallow were WFL with no overt s/s of aspiration.  No burping and c/o nausea or epigastric discomfort after intake.  Education provided to pt regarding continued recommendations to implement aspiration and reflux precautions, diet recommendations, and no need for further SLP services at this time.  Pt expressed understanding.     Assessment:     Marisol Kerr is a 76 y.o. female.  Oral and pharyngeal phases of the swallow appear WFL.  Pt safe to continue regular consistency diet and thin liquids.  Strategies to compensate for esophageal dysmotility should be implemented.  No further skilled SLP services warranted at this time.     Goals:   Multidisciplinary Problems     SLP Goals     Not on file          Multidisciplinary Problems (Resolved)        Problem: SLP    Goal Priority Disciplines Outcome   SLP Goal   (Resolved)     " SLP Met          Problem: SLP    Goal Priority Disciplines Outcome   SLP Goal   (Resolved)     SLP Met   Description: Speech Language Pathology Goals  Goals expected to be met by 4/19:  1. Pt will participate in ongoing assessment of swallow function given advanced textures to determine safest and least restrictive diet.                    Plan:     · Plan of Care reviewed with:  patient   · SLP Follow-Up:  No       Discharge recommendations:   (likely no ST needs post d/c)     Time Tracking:     SLP Treatment Date:   04/19/22  Speech Start Time:  1017  Speech Stop Time:  1036     Speech Total Time (min):  19 min    Billable Minutes: Treatment Swallowing Dysfunction 10 and Self Care/Home Management Training 9    04/20/2022

## 2022-04-20 NOTE — NURSING
Patient sbrady, HR consistent 44-47 lowest noted 42, patient asymptomatic, up in chair oriented x 4 watching TV, MD aware.

## 2022-04-20 NOTE — PLAN OF CARE
Problem: SLP  Goal: SLP Goal  Description: Speech Language Pathology Goals  Goals expected to be met by 4/19:  1. Pt will participate in ongoing assessment of swallow function given advanced textures to determine safest and least restrictive diet.   Outcome: Met    Pt tolerating a regular consistency diet and thin liquids. Continue aspiration and reflux precautions. No further SLP services warranted at this time.

## 2022-04-21 LAB
ALBUMIN SERPL BCP-MCNC: 2.6 G/DL (ref 3.5–5.2)
ALP SERPL-CCNC: 68 U/L (ref 55–135)
ALT SERPL W/O P-5'-P-CCNC: 24 U/L (ref 10–44)
ANION GAP SERPL CALC-SCNC: 10 MMOL/L (ref 8–16)
ANISOCYTOSIS BLD QL SMEAR: SLIGHT
AST SERPL-CCNC: 19 U/L (ref 10–40)
BASOPHILS # BLD AUTO: 0.07 K/UL (ref 0–0.2)
BASOPHILS NFR BLD: 0.5 % (ref 0–1.9)
BILIRUB SERPL-MCNC: 0.3 MG/DL (ref 0.1–1)
BUN SERPL-MCNC: 73 MG/DL (ref 8–23)
CALCIUM SERPL-MCNC: 8.7 MG/DL (ref 8.7–10.5)
CHLORIDE SERPL-SCNC: 104 MMOL/L (ref 95–110)
CO2 SERPL-SCNC: 30 MMOL/L (ref 23–29)
CREAT SERPL-MCNC: 2.4 MG/DL (ref 0.5–1.4)
DIFFERENTIAL METHOD: ABNORMAL
EOSINOPHIL # BLD AUTO: 0.5 K/UL (ref 0–0.5)
EOSINOPHIL NFR BLD: 3.7 % (ref 0–8)
ERYTHROCYTE [DISTWIDTH] IN BLOOD BY AUTOMATED COUNT: 13.2 % (ref 11.5–14.5)
EST. GFR  (AFRICAN AMERICAN): 22.3 ML/MIN/1.73 M^2
EST. GFR  (NON AFRICAN AMERICAN): 19.3 ML/MIN/1.73 M^2
GLUCOSE SERPL-MCNC: 74 MG/DL (ref 70–110)
HCT VFR BLD AUTO: 30.5 % (ref 37–48.5)
HGB BLD-MCNC: 9.5 G/DL (ref 12–16)
IMM GRANULOCYTES # BLD AUTO: 0.16 K/UL (ref 0–0.04)
IMM GRANULOCYTES NFR BLD AUTO: 1.2 % (ref 0–0.5)
LYMPHOCYTES # BLD AUTO: 2.7 K/UL (ref 1–4.8)
LYMPHOCYTES NFR BLD: 20.3 % (ref 18–48)
MAGNESIUM SERPL-MCNC: 2.3 MG/DL (ref 1.6–2.6)
MCH RBC QN AUTO: 28.9 PG (ref 27–31)
MCHC RBC AUTO-ENTMCNC: 31.1 G/DL (ref 32–36)
MCV RBC AUTO: 93 FL (ref 82–98)
MONOCYTES # BLD AUTO: 0.7 K/UL (ref 0.3–1)
MONOCYTES NFR BLD: 5.3 % (ref 4–15)
NEUTROPHILS # BLD AUTO: 9.2 K/UL (ref 1.8–7.7)
NEUTROPHILS NFR BLD: 69 % (ref 38–73)
NRBC BLD-RTO: 0 /100 WBC
PLATELET # BLD AUTO: 181 K/UL (ref 150–450)
PLATELET BLD QL SMEAR: ABNORMAL
PMV BLD AUTO: 11.8 FL (ref 9.2–12.9)
POCT GLUCOSE: 138 MG/DL (ref 70–110)
POCT GLUCOSE: 334 MG/DL (ref 70–110)
POCT GLUCOSE: 81 MG/DL (ref 70–110)
POIKILOCYTOSIS BLD QL SMEAR: SLIGHT
POTASSIUM SERPL-SCNC: 4 MMOL/L (ref 3.5–5.1)
PROT SERPL-MCNC: 5.7 G/DL (ref 6–8.4)
RBC # BLD AUTO: 3.29 M/UL (ref 4–5.4)
SODIUM SERPL-SCNC: 144 MMOL/L (ref 136–145)
WBC # BLD AUTO: 13.33 K/UL (ref 3.9–12.7)

## 2022-04-21 PROCEDURE — 94761 N-INVAS EAR/PLS OXIMETRY MLT: CPT

## 2022-04-21 PROCEDURE — 83735 ASSAY OF MAGNESIUM: CPT | Performed by: INTERNAL MEDICINE

## 2022-04-21 PROCEDURE — 25000003 PHARM REV CODE 250: Performed by: INTERNAL MEDICINE

## 2022-04-21 PROCEDURE — 25000003 PHARM REV CODE 250

## 2022-04-21 PROCEDURE — 99231 PR SUBSEQUENT HOSPITAL CARE,LEVL I: ICD-10-PCS | Mod: GC,,, | Performed by: INTERNAL MEDICINE

## 2022-04-21 PROCEDURE — 97530 THERAPEUTIC ACTIVITIES: CPT

## 2022-04-21 PROCEDURE — 63600175 PHARM REV CODE 636 W HCPCS: Performed by: STUDENT IN AN ORGANIZED HEALTH CARE EDUCATION/TRAINING PROGRAM

## 2022-04-21 PROCEDURE — 36415 COLL VENOUS BLD VENIPUNCTURE: CPT | Performed by: INTERNAL MEDICINE

## 2022-04-21 PROCEDURE — 94640 AIRWAY INHALATION TREATMENT: CPT

## 2022-04-21 PROCEDURE — 27000221 HC OXYGEN, UP TO 24 HOURS

## 2022-04-21 PROCEDURE — 99900035 HC TECH TIME PER 15 MIN (STAT)

## 2022-04-21 PROCEDURE — 85025 COMPLETE CBC W/AUTO DIFF WBC: CPT

## 2022-04-21 PROCEDURE — 80053 COMPREHEN METABOLIC PANEL: CPT

## 2022-04-21 PROCEDURE — 25000003 PHARM REV CODE 250: Performed by: STUDENT IN AN ORGANIZED HEALTH CARE EDUCATION/TRAINING PROGRAM

## 2022-04-21 PROCEDURE — 20600001 HC STEP DOWN PRIVATE ROOM

## 2022-04-21 PROCEDURE — 99231 SBSQ HOSP IP/OBS SF/LOW 25: CPT | Mod: GC,,, | Performed by: INTERNAL MEDICINE

## 2022-04-21 RX ADMIN — TIOTROPIUM BROMIDE INHALATION SPRAY 2 PUFF: 3.12 SPRAY, METERED RESPIRATORY (INHALATION) at 11:04

## 2022-04-21 RX ADMIN — HEPARIN SODIUM 5000 UNITS: 5000 INJECTION INTRAVENOUS; SUBCUTANEOUS at 11:04

## 2022-04-21 RX ADMIN — DILTIAZEM HYDROCHLORIDE 120 MG: 120 CAPSULE, COATED, EXTENDED RELEASE ORAL at 08:04

## 2022-04-21 RX ADMIN — TICAGRELOR 90 MG: 90 TABLET ORAL at 08:04

## 2022-04-21 RX ADMIN — HEPARIN SODIUM 5000 UNITS: 5000 INJECTION INTRAVENOUS; SUBCUTANEOUS at 01:04

## 2022-04-21 RX ADMIN — PANTOPRAZOLE SODIUM 40 MG: 40 TABLET, DELAYED RELEASE ORAL at 08:04

## 2022-04-21 RX ADMIN — FLUTICASONE FUROATE AND VILANTEROL TRIFENATATE 1 PUFF: 100; 25 POWDER RESPIRATORY (INHALATION) at 11:04

## 2022-04-21 RX ADMIN — ISOSORBIDE MONONITRATE 60 MG: 60 TABLET, EXTENDED RELEASE ORAL at 08:04

## 2022-04-21 RX ADMIN — ATORVASTATIN CALCIUM 80 MG: 20 TABLET, FILM COATED ORAL at 08:04

## 2022-04-21 RX ADMIN — Medication 100 MG: at 08:04

## 2022-04-21 RX ADMIN — ASPIRIN 81 MG: 81 TABLET, COATED ORAL at 08:04

## 2022-04-21 RX ADMIN — GABAPENTIN 100 MG: 100 CAPSULE ORAL at 08:04

## 2022-04-21 RX ADMIN — METOPROLOL SUCCINATE 50 MG: 50 TABLET, EXTENDED RELEASE ORAL at 08:04

## 2022-04-21 RX ADMIN — HEPARIN SODIUM 5000 UNITS: 5000 INJECTION INTRAVENOUS; SUBCUTANEOUS at 05:04

## 2022-04-21 RX ADMIN — ALLOPURINOL 50 MG: 300 TABLET ORAL at 11:04

## 2022-04-21 NOTE — PLAN OF CARE
Discharge Recommendation: HHPT.    1 goal met today. PT goals appropriate.    Patient is safe to perform in-room ambulation with SBA to supervision and RW with nursing staff.    Please continue Progressive Mobility Protocol as appropriate.    Klaudia Avelar, PT, DPT  2022  Pager: [phone]      Problem: Physical Therapy  Goal: Physical Therapy Goal  Description: Goals to be met by: 2022    Patient will increase functional independence with mobility by performin. Gait  x 25 feet with Supervision using Rolling Walker. - not met  2. Stand for 3 minutes with Supervision using Rolling Walker - met   3. Lower extremity exercise program x15 reps per handout, with independence - not met  Outcome: Ongoing, Progressing

## 2022-04-21 NOTE — PLAN OF CARE
Problem: Adult Inpatient Plan of Care  Goal: Plan of Care Review  Outcome: Ongoing, Progressing  Goal: Patient-Specific Goal (Individualized)  Outcome: Ongoing, Progressing  Goal: Absence of Hospital-Acquired Illness or Injury  Outcome: Ongoing, Progressing  Goal: Optimal Comfort and Wellbeing  Outcome: Ongoing, Progressing  Goal: Readiness for Transition of Care  Outcome: Ongoing, Progressing     Problem: Diabetes Comorbidity  Goal: Blood Glucose Level Within Targeted Range  Outcome: Ongoing, Progressing     Problem: Impaired Wound Healing  Goal: Optimal Wound Healing  Outcome: Ongoing, Progressing     Problem: Fall Injury Risk  Goal: Absence of Fall and Fall-Related Injury  Outcome: Ongoing, Progressing     Problem: Skin Injury Risk Increased  Goal: Skin Health and Integrity  Outcome: Ongoing, Progressing     Problem: Fluid Imbalance (Pneumonia)  Goal: Fluid Balance  Outcome: Ongoing, Progressing     Problem: Infection (Pneumonia)  Goal: Resolution of Infection Signs and Symptoms  Outcome: Ongoing, Progressing     Problem: Respiratory Compromise (Pneumonia)  Goal: Effective Oxygenation and Ventilation  Outcome: Ongoing, Progressing     Problem: Infection  Goal: Absence of Infection Signs and Symptoms  Outcome: Ongoing, Progressing   POC reviewed with patient. VSS. Blood glucose monitored. Denies pain or SOB. Purwick in place. UO WNL. OOB to recliner chair with 1 assist. Fall precautions reviewed. Call light in reach.

## 2022-04-21 NOTE — ASSESSMENT & PLAN NOTE
On NC at home and underlying COPD. No s/s exacerbation.  -Continue home inhaler regimen  -Albuterol nebs PRN  -Supplemental oxygen w/ goal SpO2 88-92%

## 2022-04-21 NOTE — ASSESSMENT & PLAN NOTE
Admitted with acute onset chest pressure, EKG with lateral ST depressions, and troponin 0.06 (mildly elevated) all consistent with NSTEMI. Currently stable and asymptomatic. 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 Outpt plan to follow-up with Dr Chappell for high risk PCI. Troponin peaked at 0.07.     -On ASA/Brilinta. Heparin held from admission due to prior spontaneous rectus sheath hematoma s/p L heart cath.   -Continue atorvastatin, metoprolol  -SL Nitro PRN for chest pain  -Patient planned to have high risk PCI w/ Dr. Chappell, will plan for outpatient.

## 2022-04-21 NOTE — PT/OT/SLP PROGRESS
"Physical Therapy Treatment    Patient Name:  Marisol Kerr   MRN:  [id number]  Admitting Diagnosis:  NSTEMI (non-ST elevated myocardial infarction)   Recent Surgery: Procedure(s) (LRB):  EGD (ESOPHAGOGASTRODUODENOSCOPY) (N/A) 7 Days Post-Op  Admit Date: 4/8/2022  Length of Stay: 13 days    Recommendations:     Discharge Recommendations:  home health PT, home health OT   Discharge Equipment Recommendations: none   Barriers to discharge: None    Assessment:     Marisol Kerr is a 76 y.o. female admitted with a medical diagnosis of NSTEMI (non-ST elevated myocardial infarction).  She presents with the following impairments/functional limitations:  weakness, impaired endurance, impaired self care skills, impaired functional mobilty, gait instability, decreased safety awareness, impaired skin, impaired cardiopulmonary response to activity. Pt tolerated session fairly on this date. Pt's progression with PT primarily limited by increased pain on buttocks and gluteal cleft and reports standing and taking steps leading to "chafing" which worsens pain. Pt agreeable to performing x1 stand for pressure relief and for PT to inspect skin. Noticeable non-blanchable redness throughout with skin appearing raw and TTP.  Pt able to tolerate standing for 3 minutes, meeting x1 short-term goal, and take steps fwd/bwd with no LOB or SOB noted. Pt with increased offloading through BUE and FFP due to increased pain. Pt reports RN aware of skin and performing appropriate wound care. Pt will continue to benefit from skilled PT services during this hospital admit to continue to improve transfer ability and efficiency as well as continue to progress pt's ambulation distance and cardiopulmonary endurance to maximize pt's functional independence and return to PLOF.    Rehab Prognosis: Good; patient would benefit from acute skilled PT services to address these deficits and reach maximum level of function.    Recent Surgery: Procedure(s) (LRB):  EGD " "(ESOPHAGOGASTRODUODENOSCOPY) (N/A) 7 Days Post-Op    Plan:     During this hospitalization, patient to be seen 3 x/week to address the identified rehab impairments via gait training, therapeutic activities, therapeutic exercises, neuromuscular re-education and progress toward the following goals:    · Plan of Care Expires:  05/10/22    Subjective     RN notified prior to session.  No family/friends present upon PT entrance into room.    Chief Complaint: Pt states, "It hurts to much to walk. That skin rubs together and it is so painful"  Patient/Family Comments/goals: get stronger and go home  Pain/Comfort:  Pain Rating 1: other (see comments) (unrated pain in buttocks)  Location - Side 1: Bilateral  Location - Orientation 1: generalized  Location 1:  (buttocks including gluteal crest)  Pain Addressed 1: Reposition, Distraction  Pain Rating Post-Intervention 1: other (see comments) (no change reported with weight shifting laterally)      Objective:     Additional staff present: none    Patient found up in chair with: telemetry, peripheral IV, PureWick   Cognition:   · Alert and Cooperative  · AxOx4  · Command following: Follows multistep verbal commands  · Fluency: clear/fluent  General Precautions: Standard, Cardiac fall   Orthopedic Precautions:N/A   Braces: N/A   Body mass index is 30.71 kg/m².  Oxygen Device: Room Air  Vitals: BP (!) 170/72 (BP Location: Right arm, Patient Position: Lying)   Pulse 70   Temp 97.6 °F (36.4 °C) (Oral)   Resp 18   Ht 5' 3" (1.6 m)   Wt 78.7 kg (173 lb 6.3 oz)   SpO2 96%   Breastfeeding No   BMI 30.71 kg/m²     Outcome Measures:  AM-PAC 6 CLICK MOBILITY  Turning over in bed (including adjusting bedclothes, sheets and blankets)?: 4  Sitting down on and standing up from a chair with arms (e.g., wheelchair, bedside commode, etc.): 4  Moving from lying on back to sitting on the side of the bed?: 4  Moving to and from a bed to a chair (including a wheelchair)?: 3  Need to walk in " hospital room?: 3  Climbing 3-5 steps with a railing?: 3  Basic Mobility Total Score: 21     Functional Mobility:    Bed Mobility:   · Pt found/returned to bedside chair    Transfers:   · Sit <> Stand Transfer: supervision with rolling walker   · Stand <> Sit Transfer: supervision with rolling walker   · [id number] from bedside chair    Standing Balance:   Static Standing Balance: Good : able to maintain standing balance against moderate resistance   Dynamic Standing Balance: Good- : able to stand independently unsupported and weight shift across midline minimally   Assistance Level Required: Supervision   Patient used: rolling walker    Time: approx 3 minutes   Postural deviations noted: slouched posture, rounded shoulders and forward head   Comments: Time in unsupported standing focused on cardiopulmonary tolerance to unsupported standing as well as strength/endurance to perform dynamic balance activity safely with no LOB and BUE support. Pt able to respond to with hip strategy to internal/external perturbations with appropriate anticipatory and reactive responses with no LOB. Bilateral UE support needed throughout and pt able to complete balance challenges with anterior reaches inside DOUG with no LOB.      Gait:  · Patient ambulated: 3 steps fwd/3 steps bwd   · Patient required: standy by assistance  · Patient used:  rolling walker   · Gait Pattern observed: reciprocal gait  · Gait Deviation(s): decreased step length, wide base of support, decreased weight shift, decreased arm swing, shuffle gait, flexed posture and decreased esther  · Impairments due to: pain and decreased endurance  · all lines remained intact throughout ambulation trial  · Comments: Pt needing significant offloading through BUE and RW due to pain with steps. Pt with FFP but steady gait throughout. SBA 2/2 increased pain. Decreased step length and wide DOUG.      Education:   Time provided for education, counseling and discussion of  health disposition in regards to patient's current status   All questions answered within PT scope of practice and to patient's satisfaction   PT role in POC to address current functional deficits   Pt educated on proper body mechanics, safety techniques, and energy conservation with PT facilitation and cueing throughout session   Call nursing/pct to transfer to chair/use bathroom. Pt stated understanding.   Whiteboard updated with therapist name and pt's current mobility status documented above   Safe to perform step transfer to/from chair/bedside commode supervision and RW w/ nursing/PCT present   Pt to ambulate 1-2x/day SBA to supervision and RW with nsg/family in order to maintain functional mobility   Importance of OOB tolerance prn hrs/day to improve lung ventilation and expansion as well as strengthen postural musculature    Patient left up in chair with all lines intact and call button in reach.    GOALS:   Multidisciplinary Problems     Physical Therapy Goals        Problem: Physical Therapy    Goal Priority Disciplines Outcome Goal Variances Interventions   Physical Therapy Goal     PT, PT/OT Ongoing, Progressing     Description: Goals to be met by: 2022    Patient will increase functional independence with mobility by performin. Gait  x 25 feet with Supervision using Rolling Walker. - not met  2. Stand for 3 minutes with Supervision using Rolling Walker - met   3. Lower extremity exercise program x15 reps per handout, with independence - not met                 Time Tracking:     PT Received On: 22  PT Start Time: 1133     PT Stop Time: 1203  PT Total Time (min): 30 min     Billable Minutes:   · Therapeutic Activity 23 minutes    Treatment Type: Treatment  PT/PTA: PT       Klaudia Avelar PT, DPT  2022  Pager: [phone]

## 2022-04-21 NOTE — ASSESSMENT & PLAN NOTE
Prior spontaneous rectus sheath hematoma after angiogram 2/2022 requiring inferior epigastric artery embolization.   No s/s bleeding and on DAPT.     -Continue home ferrous sulfate  -Type and screen for potential procedure  -Trend CBC daily  -Transfuse to maintain Hb >8 with CAD

## 2022-04-21 NOTE — ASSESSMENT & PLAN NOTE
-L great toe pain clinically consistent w/ gout. S/p 5 day prednisone 40mg course given patient's poor kidney function.   -Started Allopurinol QOD 50 mg per renal function

## 2022-04-21 NOTE — PROGRESS NOTES
[facility] - [facility] Step[facility]  Cardiology  Progress Note    Patient Name: Marisol Kerr  MRN: [id number]  Admission Date: 4/8/2022  Hospital Length of Stay: 13 days  Code Status: Full Code   Attending Physician: Kaycee Mitchell MD   Primary Care Physician: Khadar Dwyer MD  Expected Discharge Date: 4/22/2022  Principal Problem:NSTEMI (non-ST elevated myocardial infarction)    Subjective:     Interval History: No acute events overnight. Patient's SOB and diarrhea resolved. No issues with PO intake. As per PT/OT and PM&R, will discharge w/ home PT/OT. Patient will be started on Allopurinol QOD per renal function. Will get an CXR for follow up on effusion. Her Scr is 2.4 and will continue to hold diuretic. When we start her diuretic it will be low dose Torsemide 10 mg.     Objective:     Vital Signs (Most Recent):  Temp: 97.6 °F (36.4 °C) (04/21/22 1113)  Pulse: 70 (04/21/22 1145)  Resp: 18 (04/21/22 1113)  BP: (!) 170/72 (04/21/22 1113)  SpO2: 96 % (04/21/22 1113)   Vital Signs (24h Range):  Temp:  [96.6 °F (35.9 °C)-98.1 °F (36.7 °C)] 97.6 °F (36.4 °C)  Pulse:  [47-88] 70  Resp:  [16-19] 18  SpO2:  [95 %-98 %] 96 %  BP: (114-176)/(52-73) 170/72     Weight: 78.7 kg (173 lb 6.3 oz)  Body mass index is 30.71 kg/m².     SpO2: 96 %  O2 Device (Oxygen Therapy): nasal cannula      Intake/Output Summary (Last 24 hours) at 4/21/2022 1200  Last data filed at 4/21/2022 0900  Gross per 24 hour   Intake 460 ml   Output 1150 ml   Net -690 ml       Physical Exam  Vitals and nursing note reviewed.   Constitutional:       Appearance: Normal appearance.   HENT:      Head: Normocephalic and atraumatic.      Nose: Nose normal.      Mouth/Throat:      Mouth: Mucous membranes are moist.      Pharynx: Oropharynx is clear.   Eyes:      Extraocular Movements: Extraocular movements intact.   Cardiovascular:      Rate and Rhythm: Normal rate and regular rhythm.      Pulses: Normal pulses.      Heart sounds: Normal heart sounds.    Pulmonary:      Effort: Pulmonary effort is normal.      Breath sounds: Normal breath sounds. No wheezing or rales.   Abdominal:      General: Bowel sounds are normal.      Palpations: Abdomen is soft.      Tenderness: There is no abdominal tenderness.      Hernia: A hernia (R hernia abdominal hernia, reducable, non-painful) is present.   Musculoskeletal:         General: Normal range of motion.      Cervical back: Normal range of motion and neck supple.      Right lower leg: No edema.      Left lower leg: No edema.   Skin:     General: Skin is warm and dry.   Neurological:      General: No focal deficit present.      Mental Status: She is alert and oriented to person, place, and time.   Psychiatric:         Mood and Affect: Mood normal.         Behavior: Behavior normal.       Significant Labs: All pertinent lab results from the last 24 hours have been reviewed.    Significant Imaging: Echocardiogram: Transthoracic echo (TTE) complete (Cupid Only):   Results for orders placed or performed during the hospital encounter of 04/08/22   Echo   Result Value Ref Range    LVIDd 3.37 (A) 3.5 - 6.0 cm    LVIDs 2.36 2.1 - 4.0 cm    LV Diastolic Volume 46.49 mL    LV Systolic Volume 19.23 mL    FS 30 %    LV Systolic Volume Index 10.6 mL/m2    LV Diastolic Volume Index 25.54 mL/m2    BSA 1.87 m2    EF 60 %    Narrative    · Limited study for wall morgan. several off axis views.  · The estimated ejection fraction is 60%.  · The left ventricle is normal in size with normal systolic function.  · The following segments are hypokinetic: basal inferoseptal and basal   inferior. All other segments are normal.  · Normal right ventricular size with normal right ventricular systolic   function.        Assessment and Plan:       * NSTEMI (non-ST elevated myocardial infarction)  Admitted with acute onset chest pressure, EKG with lateral ST depressions, and troponin 0.06 (mildly elevated) all consistent with NSTEMI. Currently stable and  asymptomatic. 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 Outpt plan to follow-up with Dr Chappell for high risk PCI. Troponin peaked at 0.07.     -On ASA/Brilinta. Heparin held from admission due to prior spontaneous rectus sheath hematoma s/p L heart cath.   -Continue atorvastatin, metoprolol  -SL Nitro PRN for chest pain  -Patient planned to have high risk PCI w/ Dr. Chappell, will plan for outpatient.     Impaired functional mobility and endurance  - PT/OT consulted, appreciate recs    Gout  -L great toe pain clinically consistent w/ gout. S/p 5 day prednisone 40mg course given patient's poor kidney function.   -Started Allopurinol QOD 50 mg per renal function     Esophageal dysmotility  Esophogram done 4/14 showed esophageal dysmotility characterized by diminished secondary waves and presence of tertiary contractions.   -Possible that dysmotility is related to increased contractility vs spasm  -Patient started on diltiazem w/ improvement in symptoms     HAP (hospital-acquired pneumonia)  Has persistent cough and increased sputum production. Not infectious appearing, afebrile and without leukocytosis. However, CXR w/ increased RLL and RML infiltrates. No improvement of symptoms w/ symptomatic treatment, thus will treat for HAP w/ Zosyn. Additionally, concerned for microaspiration given patient continues to regurgitate food - SLP consulted, appreciate recommendations. Consulted GI and SLP and will proceed with esophageal evaluation for dysphagia and globus sensation.   - s/p 7 day course of antibiotic therapy.       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   No s/s bleeding and on DAPT.     -Continue home ferrous sulfate  -Type and screen for potential procedure  -Trend CBC daily  -Transfuse to maintain Hb >8 with CAD    Leukocytosis  Leukocytosis 4/17: patient does not  appear to be infectious. Does endorse 5-7 episodes of watery diarrhea/day x 2 days. Low suspicion for C diff. Will continue patient's home cholestyramine; symptoms improved    Chronic diastolic congestive heart failure  - TTE on 04/09 with EF 60%, hypokinetic basal inferoseptal and inferior hypokinesis.  - IV lasix was converted to torsemide.   - Daily weights, strict I/Os  - Start SLGT2 at discharge        Chronic hypoxemic respiratory failure  On NC at home and underlying COPD. No s/s exacerbation.  -Continue home inhaler regimen  -Albuterol nebs PRN  -Supplemental oxygen w/ goal SpO2 88-92%      CKD (chronic kidney disease), stage III  Patient w/ temporarily worsening Cr thought to be pre-renal in etiology. Baseline Scr ~1.3. However, patient initially volume overloaded. Initially with cough, orthopnea and PND.CXR consistent w/ pulmonary edema. , likely falsely lowered in the setting of obesity. High suspicion for cardiorenal syndrome, thus patient diuresed w/ IV lasix. Symptoms improved.  -IV lasix transitioned to torsemide 40mg QD but now held due to ISSA  -Avoid nephrotoxins  -Renally dose medications  - aily BMP to monitor electrolytes and Cr    Gastroesophageal reflux disease without esophagitis  -Continue home PPI  -Low suspicion for candidiasis, discontinued fluconazole.     Essential hypertension, benign  -Home antihypertensives as tolerated        VTE Risk Mitigation (From admission, onward)         Ordered     heparin (porcine) injection 5,000 Units  Every 8 hours         04/19/22 1627     IP VTE HIGH RISK PATIENT  Once         04/08/22 2203     Place sequential compression device  Until discontinued         04/08/22 2203                Yassine Cueva MD  Cardiology  [facility] - Cardiology Stepdown

## 2022-04-21 NOTE — ASSESSMENT & PLAN NOTE
Patient w/ temporarily worsening Cr thought to be pre-renal in etiology. Baseline Scr ~1.3. However, patient initially volume overloaded. Initially with cough, orthopnea and PND.CXR consistent w/ pulmonary edema. , likely falsely lowered in the setting of obesity. High suspicion for cardiorenal syndrome, thus patient diuresed w/ IV lasix. Symptoms improved.  -IV lasix transitioned to torsemide 40mg QD but now held due to ISSA  -Avoid nephrotoxins  -Renally dose medications  - aily BMP to monitor electrolytes and Cr

## 2022-04-21 NOTE — ASSESSMENT & PLAN NOTE
Leukocytosis 4/17: patient does not appear to be infectious. Does endorse 5-7 episodes of watery diarrhea/day x 2 days. Low suspicion for C diff. Will continue patient's home cholestyramine; symptoms improved

## 2022-04-21 NOTE — ASSESSMENT & PLAN NOTE
- TTE on 04/09 with EF 60%, hypokinetic basal inferoseptal and inferior hypokinesis.  - IV lasix was converted to torsemide.   - Daily weights, strict I/Os  - Start SLGT2 at discharge

## 2022-04-21 NOTE — ASSESSMENT & PLAN NOTE
Esophogram done 4/14 showed esophageal dysmotility characterized by diminished secondary waves and presence of tertiary contractions.   -Possible that dysmotility is related to increased contractility vs spasm  -Patient started on diltiazem w/ improvement in symptoms

## 2022-04-21 NOTE — ASSESSMENT & PLAN NOTE
Prior spontaneous rectus sheath hematoma after angiogram 2/2022 requiring inferior epigastric artery embolization.   No s/s bleeding and on DAPT.     - Continue home ferrous sulfate  - Type and screen for potential procedure  - Trend CBC daily  - Transfuse to maintain Hb >8 with CAD

## 2022-04-21 NOTE — PLAN OF CARE
Pt is AAOx3. POC reviewed at bedside. VS stable, afebrile throughout shift.  Pt denies any chest pain or palpitation, no s/s of respiratory distress.  Pt has remained free from injury during this shift. Pt bed in low locked position. Call light and personal belongings within reach. Side rails up x2.  Pt was advice to call nurse with any questions or concerns. Will continue to monitor.

## 2022-04-21 NOTE — PLAN OF CARE
[facility] Cardiology Step[facility]  Discharge Reassessment    Primary Care Provider: Khadar Dwyer MD    Expected Discharge Date: 4/22/2022    Reassessment (most recent)     Discharge Reassessment - 04/21/22 1000        Discharge Reassessment    Assessment Type Discharge Planning Reassessment     Discharge Plan discussed with: Adult children   by physician    Discharge Plan A Home Health     Discharge Plan B Home with family     Why the patient remains in the hospital Requires continued medical care        Post-Acute Status    Post-Acute Authorization Home Health     Home Health Status Pending medical clearance/testing     Coverage Medicare             Per PMR VICENTE Cuellar PMR rec is HH.  Per Dr Mitchell pt's daughter is aware that pt is not a candidate for rehab and therefore will go home with HH.  Pt already accepted by [facility] pending medical clearance.  Will continue to follow.    Ronda Esquivel LMSW  [facility]  [id number]

## 2022-04-21 NOTE — SUBJECTIVE & OBJECTIVE
Interval History: No acute events overnight. Patient's SOB and diarrhea improved. Tolerated PO intake. As per PT/OT and PM&R, will likely discharge w/ home PT/OT but will wait for final recommendations.     Review of Systems   Constitutional: Negative for chills, decreased appetite, diaphoresis, fever, malaise/fatigue and weight gain.   HENT: Negative.  Negative for congestion and sore throat.    Eyes:  Negative for blurred vision and double vision.   Cardiovascular:  Negative for chest pain, dyspnea on exertion, irregular heartbeat, leg swelling and palpitations.   Respiratory:  Positive for cough. Negative for hemoptysis, shortness of breath and wheezing.    Hematologic/Lymphatic: Positive for bleeding problem. Bruises/bleeds easily.   Skin: Negative.  Negative for itching and rash.   Musculoskeletal:  Positive for arthritis. Negative for falls and joint pain.   Gastrointestinal:  Positive for nausea and vomiting. Negative for bloating, abdominal pain, constipation, diarrhea and melena.   Genitourinary: Negative.  Negative for hematuria and urgency.   Neurological:  Negative for dizziness, headaches, light-headedness and numbness.   Psychiatric/Behavioral: Negative.  Negative for altered mental status and depression.    Objective:     Vital Signs (Most Recent):  Temp: 98.1 °F (36.7 °C) (04/20/22 1913)  Pulse: (!) 54 (04/20/22 1942)  Resp: 19 (04/20/22 1913)  BP: (!) 114/52 (04/20/22 1913)  SpO2: 96 % (04/20/22 1913)   Vital Signs (24h Range):  Temp:  [97.8 °F (36.6 °C)-98.1 °F (36.7 °C)] 98.1 °F (36.7 °C)  Pulse:  [47-84] 54  Resp:  [18-20] 19  SpO2:  [95 %-98 %] 96 %  BP: (114-149)/(52-67) 114/52     Weight: 78.7 kg (173 lb 6.3 oz)  Body mass index is 30.71 kg/m².     SpO2: 96 %  O2 Device (Oxygen Therapy): nasal cannula      Intake/Output Summary (Last 24 hours) at 4/20/2022 2036  Last data filed at 4/20/2022 1800  Gross per 24 hour   Intake 120 ml   Output 1400 ml   Net -1280 ml       Lines/Drains/Airways        Drain  Duration             Female External Urinary Catheter 03/27/22 2000 24 days              Peripheral Intravenous Line  Duration                  Peripheral IV - Single Lumen 04/17/22 0200 20 G Anterior;Left;Proximal Forearm 3 days         Peripheral IV - Single Lumen 04/18/22 1349 20 G;1 3/4 in Right Forearm 2 days                    Physical Exam  Vitals and nursing note reviewed.   Constitutional:       Appearance: Normal appearance.   HENT:      Head: Normocephalic and atraumatic.      Nose: Nose normal.      Mouth/Throat:      Mouth: Mucous membranes are moist.      Pharynx: Oropharynx is clear.      Comments: Oozing blister on bottom lip   Eyes:      Extraocular Movements: Extraocular movements intact.   Cardiovascular:      Rate and Rhythm: Normal rate and regular rhythm.      Pulses: Normal pulses.      Heart sounds: Normal heart sounds.   Pulmonary:      Effort: Pulmonary effort is normal.      Breath sounds: Normal breath sounds. No wheezing or rales.   Abdominal:      General: Bowel sounds are normal.      Palpations: Abdomen is soft.      Tenderness: There is no abdominal tenderness.      Hernia: A hernia (R hernia abdominal hernia, reducable, non-painful) is present.   Musculoskeletal:         General: Normal range of motion.      Cervical back: Normal range of motion and neck supple.      Right lower leg: No edema.      Left lower leg: No edema.   Skin:     General: Skin is warm and dry.   Neurological:      General: No focal deficit present.      Mental Status: She is alert and oriented to person, place, and time.   Psychiatric:         Mood and Affect: Mood normal.         Behavior: Behavior normal.       Significant Labs: All pertinent lab results from the last 24 hours have been reviewed.    Significant Imaging: Echocardiogram: Transthoracic echo (TTE) complete (Cupid Only):   Results for orders placed or performed during the hospital encounter of 04/08/22   Echo   Result Value Ref Range     LVIDd 3.37 (A) 3.5 - 6.0 cm    LVIDs 2.36 2.1 - 4.0 cm    LV Diastolic Volume 46.49 mL    LV Systolic Volume 19.23 mL    FS 30 %    LV Systolic Volume Index 10.6 mL/m2    LV Diastolic Volume Index 25.54 mL/m2    BSA 1.87 m2    EF 60 %    Narrative    · Limited study for wall morgan. several off axis views.  · The estimated ejection fraction is 60%.  · The left ventricle is normal in size with normal systolic function.  · The following segments are hypokinetic: basal inferoseptal and basal   inferior. All other segments are normal.  · Normal right ventricular size with normal right ventricular systolic   function.

## 2022-04-21 NOTE — SUBJECTIVE & OBJECTIVE
Interval History: No acute events overnight. Patient's SOB and diarrhea resolved. No issues with PO intake. As per PT/OT and PM&R, will discharge w/ home PT/OT. Patient will be started on Allopurinol QOD per renal function. Will get an CXR for follow up on effusion. Her Scr is 2.4 and will continue to hold diuretic. When we start her diuretic it will be low dose Torsemide 10 mg.     Objective:     Vital Signs (Most Recent):  Temp: 97.6 °F (36.4 °C) (04/21/22 1113)  Pulse: 70 (04/21/22 1145)  Resp: 18 (04/21/22 1113)  BP: (!) 170/72 (04/21/22 1113)  SpO2: 96 % (04/21/22 1113)   Vital Signs (24h Range):  Temp:  [96.6 °F (35.9 °C)-98.1 °F (36.7 °C)] 97.6 °F (36.4 °C)  Pulse:  [47-88] 70  Resp:  [16-19] 18  SpO2:  [95 %-98 %] 96 %  BP: (114-176)/(52-73) 170/72     Weight: 78.7 kg (173 lb 6.3 oz)  Body mass index is 30.71 kg/m².     SpO2: 96 %  O2 Device (Oxygen Therapy): nasal cannula      Intake/Output Summary (Last 24 hours) at 4/21/2022 1200  Last data filed at 4/21/2022 0900  Gross per 24 hour   Intake 460 ml   Output 1150 ml   Net -690 ml       Physical Exam  Vitals and nursing note reviewed.   Constitutional:       Appearance: Normal appearance.   HENT:      Head: Normocephalic and atraumatic.      Nose: Nose normal.      Mouth/Throat:      Mouth: Mucous membranes are moist.      Pharynx: Oropharynx is clear.   Eyes:      Extraocular Movements: Extraocular movements intact.   Cardiovascular:      Rate and Rhythm: Normal rate and regular rhythm.      Pulses: Normal pulses.      Heart sounds: Normal heart sounds.   Pulmonary:      Effort: Pulmonary effort is normal.      Breath sounds: Normal breath sounds. No wheezing or rales.   Abdominal:      General: Bowel sounds are normal.      Palpations: Abdomen is soft.      Tenderness: There is no abdominal tenderness.      Hernia: A hernia (R hernia abdominal hernia, reducable, non-painful) is present.   Musculoskeletal:         General: Normal range of motion.       Cervical back: Normal range of motion and neck supple.      Right lower leg: No edema.      Left lower leg: No edema.   Skin:     General: Skin is warm and dry.   Neurological:      General: No focal deficit present.      Mental Status: She is alert and oriented to person, place, and time.   Psychiatric:         Mood and Affect: Mood normal.         Behavior: Behavior normal.       Significant Labs: All pertinent lab results from the last 24 hours have been reviewed.    Significant Imaging: Echocardiogram: Transthoracic echo (TTE) complete (C[facility] Only):   Results for orders placed or performed during the hospital encounter of 04/08/22   Echo   Result Value Ref Range    LVIDd 3.37 (A) 3.5 - 6.0 cm    LVIDs 2.36 2.1 - 4.0 cm    LV Diastolic Volume 46.49 mL    LV Systolic Volume 19.23 mL    FS 30 %    LV Systolic Volume Index 10.6 mL/m2    LV Diastolic Volume Index 25.54 mL/m2    BSA 1.87 m2    EF 60 %    Narrative    · Limited study for wall morgan. several off axis views.  · The estimated ejection fraction is 60%.  · The left ventricle is normal in size with normal systolic function.  · The following segments are hypokinetic: basal inferoseptal and basal   inferior. All other segments are normal.  · Normal right ventricular size with normal right ventricular systolic   function.

## 2022-04-21 NOTE — PROGRESS NOTES
[facility]  Cardiology  Progress Note    Patient Name: Marisol Kerr  MRN: [id number]  Admission Date: 4/8/2022  Hospital Length of Stay: 12 days  Code Status: Full Code   Attending Physician: Kaycee Mitchell MD   Primary Care Physician: Khadar Dwyer MD  Expected Discharge Date: 4/21/2022  Principal Problem:NSTEMI (non-ST elevated myocardial infarction)    Subjective:     Hospital Course:   Patient was asymptomatic on DAPT and Heparin, however, heparin was discontinued on arrival in the setting of known history of rectus sheath hematoma which occurred as a post-C complication. Patient originally scheduled to undergo high risk PCI whilst inpatient w/ Dr. Chappell but as per interventional cardiology, this will now take place 4/17/2022 in the outpatient setting. Of note, patient has developed worsening cough w/ increased clear sputum production and pleuritic chest pain. She remains afebrile and overall non infectious appearing. Repeat CXR w/ increased RML and RLL infiltrates. Patient's cough has not improved w/ symptomatic treatment. Given her high risk of decompensation, we will initiate HAP treatment w/ Zosyn. Additionally, concern for microaspiration 2/2 ?oropharyngeal dysphasia vs globus sensation - evaluated by SLP and GI evaluation recommended. Esophogram done 4/14/22 shows esophageal dysmotility characterized by dimished secondary waves and presence of tertiary contractions. Cr 1.4 --> 2.0; likely pre-renal in the setting of decreased intake. Will administer IVFs PRN and continue to monitor. Now improving. Anti-hypertensives held in the setting of borderline hypotension. Will plan for discharge tomorrow.       Interval History: No acute events overnight. Patient's SOB and diarrhea improved. Tolerated PO intake. As per PT/OT and PM&R, will likely discharge w/ home PT/OT but will wait for final recommendations.     Review of Systems   Constitutional: Negative for chills, decreased  appetite, diaphoresis, fever, malaise/fatigue and weight gain.   HENT: Negative.  Negative for congestion and sore throat.    Eyes:  Negative for blurred vision and double vision.   Cardiovascular:  Negative for chest pain, dyspnea on exertion, irregular heartbeat, leg swelling and palpitations.   Respiratory:  Positive for cough. Negative for hemoptysis, shortness of breath and wheezing.    Hematologic/Lymphatic: Positive for bleeding problem. Bruises/bleeds easily.   Skin: Negative.  Negative for itching and rash.   Musculoskeletal:  Positive for arthritis. Negative for falls and joint pain.   Gastrointestinal:  Positive for nausea and vomiting. Negative for bloating, abdominal pain, constipation, diarrhea and melena.   Genitourinary: Negative.  Negative for hematuria and urgency.   Neurological:  Negative for dizziness, headaches, light-headedness and numbness.   Psychiatric/Behavioral: Negative.  Negative for altered mental status and depression.    Objective:     Vital Signs (Most Recent):  Temp: 98.1 °F (36.7 °C) (04/20/22 1913)  Pulse: (!) 54 (04/20/22 1942)  Resp: 19 (04/20/22 1913)  BP: (!) 114/52 (04/20/22 1913)  SpO2: 96 % (04/20/22 1913)   Vital Signs (24h Range):  Temp:  [97.8 °F (36.6 °C)-98.1 °F (36.7 °C)] 98.1 °F (36.7 °C)  Pulse:  [47-84] 54  Resp:  [18-20] 19  SpO2:  [95 %-98 %] 96 %  BP: (114-149)/(52-67) 114/52     Weight: 78.7 kg (173 lb 6.3 oz)  Body mass index is 30.71 kg/m².     SpO2: 96 %  O2 Device (Oxygen Therapy): nasal cannula      Intake/Output Summary (Last 24 hours) at 4/20/2022 2036  Last data filed at 4/20/2022 1800  Gross per 24 hour   Intake 120 ml   Output 1400 ml   Net -1280 ml       Lines/Drains/Airways       Drain  Duration             Female External Urinary Catheter 03/27/22 2000 24 days              Peripheral Intravenous Line  Duration                  Peripheral IV - Single Lumen 04/17/22 0200 20 G Anterior;Left;Proximal Forearm 3 days         Peripheral IV - Single Lumen  04/18/22 1349 20 G;1 3/4 in Right Forearm 2 days                    Physical Exam  Vitals and nursing note reviewed.   Constitutional:       Appearance: Normal appearance.   HENT:      Head: Normocephalic and atraumatic.      Nose: Nose normal.      Mouth/Throat:      Mouth: Mucous membranes are moist.      Pharynx: Oropharynx is clear.      Comments: Oozing blister on bottom lip   Eyes:      Extraocular Movements: Extraocular movements intact.   Cardiovascular:      Rate and Rhythm: Normal rate and regular rhythm.      Pulses: Normal pulses.      Heart sounds: Normal heart sounds.   Pulmonary:      Effort: Pulmonary effort is normal.      Breath sounds: Normal breath sounds. No wheezing or rales.   Abdominal:      General: Bowel sounds are normal.      Palpations: Abdomen is soft.      Tenderness: There is no abdominal tenderness.      Hernia: A hernia (R hernia abdominal hernia, reducable, non-painful) is present.   Musculoskeletal:         General: Normal range of motion.      Cervical back: Normal range of motion and neck supple.      Right lower leg: No edema.      Left lower leg: No edema.   Skin:     General: Skin is warm and dry.   Neurological:      General: No focal deficit present.      Mental Status: She is alert and oriented to person, place, and time.   Psychiatric:         Mood and Affect: Mood normal.         Behavior: Behavior normal.       Significant Labs: All pertinent lab results from the last 24 hours have been reviewed.    Significant Imaging: Echocardiogram: Transthoracic echo (TTE) complete (C[facility] Only):   Results for orders placed or performed during the hospital encounter of 04/08/22   Echo   Result Value Ref Range    LVIDd 3.37 (A) 3.5 - 6.0 cm    LVIDs 2.36 2.1 - 4.0 cm    LV Diastolic Volume 46.49 mL    LV Systolic Volume 19.23 mL    FS 30 %    LV Systolic Volume Index 10.6 mL/m2    LV Diastolic Volume Index 25.54 mL/m2    BSA 1.87 m2    EF 60 %    Narrative    · Limited study for wall  morgan. several off axis views.  · The estimated ejection fraction is 60%.  · The left ventricle is normal in size with normal systolic function.  · The following segments are hypokinetic: basal inferoseptal and basal   inferior. All other segments are normal.  · Normal right ventricular size with normal right ventricular systolic   function.        Assessment and Plan:     * NSTEMI (non-ST elevated myocardial infarction)  Admitted with acute onset chest pressure, EKG with lateral ST depressions, and troponin 0.06 (mildly elevated) all consistent with NSTEMI. Currently stable and asymptomatic. 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 Outpt plan to follow-up with Dr Chappell for high risk PCI. Troponin peaked at 0.07.     - On ASA/Brilinta. Heparin held from admission due to prior spontaneous rectus sheath hematoma s/p L heart cath.   - Continue atorvastatin, metoprolol  - SL Nitro PRN for chest pain  - patient planned to have high risk PCI w/ Dr. Chappell, will plan for outpatient.     Impaired functional mobility and endurance  - PT/OT consulted, appreciate recs    Gout  - L great toe pain clinically consistent w/ gout. S/p 5 day prednisone 40mg course given patient's poor kidney function.     Esophageal dysmotility  - esophogram done 4/14 showed esophageal dysmotility characterized by diminished secondary waves and presence of tertiary contractions.   - possible that dysmotility is related to increased contractility vs spasm  - patient started on diltiazem w/ improvement in symptoms     HAP (hospital-acquired pneumonia)  Has persistent cough and increased sputum production. Not infectious appearing, afebrile and without leukocytosis. However, CXR w/ increased RLL and RML infiltrates. No improvement of symptoms w/ symptomatic treatment, thus will treat for HAP w/ Zosyn. Additionally, concerned for microaspiration given patient continues to regurgitate food - SLP  consulted, appreciate recommendations. Consulted GI and SLP and will proceed with esophageal evaluation for dysphagia and globus sensation.   - s/p 7 day course of antibiotic therapy.       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   No s/s bleeding and on DAPT.     - Continue home ferrous sulfate  - Type and screen for potential procedure  - Trend CBC daily  - Transfuse to maintain Hb >8 with CAD    Leukocytosis  - leukocytosis 4/17: patient does not appear to be infectious. Does endorse 5-7 episodes of watery diarrhea/day x 2 days. Low suspicion for C diff. Will continue patient's home cholestyramine; symptoms improved    Chronic diastolic congestive heart failure  - TTE on 04/09 with EF 60%, hypokinetic basal inferoseptal and inferior hypokinesis.  - IV lasix was converted to torsemide.   - Daily weights, strict I/Os  - Start SLGT2 at discharge        Chronic hypoxemic respiratory failure  On NC at home and underlying COPD. No s/s exacerbation.  - Continue home inhaler regimen  - Albuterol nebs PRN  - Supplemental oxygen w/ goal SpO2 88-92%      CKD (chronic kidney disease), stage III  - patient w/ temporarily worsening Cr thought to be pre-renal in etiology. Baseline sCr ~1.3. However, patient appears volume overloaded on examination; endorses cough, orthopnea and questionable PND.CXR consistent w/ pulmonary edema. , likely falsely lowered in the setting of obesity. High suspicion for cardiorenal syndrome, thus patient diuresed w/ IV lasix. Symptoms improved.  - IV lasix transitioned to torsemide 40mg QD   - Avoid nephrotoxins  - renally dose medications  - Daily BMP to monitor electrolytes and Cr    Gastroesophageal reflux disease without esophagitis  - Continue home PPI  - low suspicion for candidiasis, discontinue fluconazole.     Essential hypertension, benign  - continue home antihypertensives as  tolerated        VTE Risk Mitigation (From admission, onward)         Ordered     heparin (porcine) injection 5,000 Units  Every 8 hours         04/19/22 1627     IP VTE HIGH RISK PATIENT  Once         04/08/22 2203     Place sequential compression device  Until discontinued         04/08/22 2203                Estrellita Salinas MD  Cardiology  [facility]

## 2022-04-21 NOTE — ASSESSMENT & PLAN NOTE
- patient w/ temporarily worsening Cr thought to be pre-renal in etiology. Baseline sCr ~1.3. However, patient appears volume overloaded on examination; endorses cough, orthopnea and questionable PND.CXR consistent w/ pulmonary edema. , likely falsely lowered in the setting of obesity. High suspicion for cardiorenal syndrome, thus patient diuresed w/ IV lasix. Symptoms improved.  - IV lasix transitioned to torsemide 40mg QD   - Avoid nephrotoxins  - renally dose medications  - Daily BMP to monitor electrolytes and Cr

## 2022-04-22 VITALS
SYSTOLIC BLOOD PRESSURE: 135 MMHG | TEMPERATURE: 98 F | RESPIRATION RATE: 20 BRPM | OXYGEN SATURATION: 99 % | WEIGHT: 173.38 LBS | HEIGHT: 63 IN | DIASTOLIC BLOOD PRESSURE: 62 MMHG | BODY MASS INDEX: 30.72 KG/M2 | HEART RATE: 68 BPM

## 2022-04-22 LAB
ALBUMIN SERPL BCP-MCNC: 2.5 G/DL (ref 3.5–5.2)
ALP SERPL-CCNC: 77 U/L (ref 55–135)
ALT SERPL W/O P-5'-P-CCNC: 39 U/L (ref 10–44)
ANION GAP SERPL CALC-SCNC: 9 MMOL/L (ref 8–16)
AST SERPL-CCNC: 30 U/L (ref 10–40)
BASOPHILS # BLD AUTO: 0.05 K/UL (ref 0–0.2)
BASOPHILS NFR BLD: 0.4 % (ref 0–1.9)
BILIRUB SERPL-MCNC: 0.2 MG/DL (ref 0.1–1)
BUN SERPL-MCNC: 65 MG/DL (ref 8–23)
CALCIUM SERPL-MCNC: 8.8 MG/DL (ref 8.7–10.5)
CHLORIDE SERPL-SCNC: 105 MMOL/L (ref 95–110)
CO2 SERPL-SCNC: 30 MMOL/L (ref 23–29)
CREAT SERPL-MCNC: 1.7 MG/DL (ref 0.5–1.4)
DIFFERENTIAL METHOD: ABNORMAL
EOSINOPHIL # BLD AUTO: 0.3 K/UL (ref 0–0.5)
EOSINOPHIL NFR BLD: 2.5 % (ref 0–8)
ERYTHROCYTE [DISTWIDTH] IN BLOOD BY AUTOMATED COUNT: 13.4 % (ref 11.5–14.5)
EST. GFR  (AFRICAN AMERICAN): 33.8 ML/MIN/1.73 M^2
EST. GFR  (NON AFRICAN AMERICAN): 29.3 ML/MIN/1.73 M^2
GLUCOSE SERPL-MCNC: 98 MG/DL (ref 70–110)
HCT VFR BLD AUTO: 30.7 % (ref 37–48.5)
HGB BLD-MCNC: 9.7 G/DL (ref 12–16)
IMM GRANULOCYTES # BLD AUTO: 0.13 K/UL (ref 0–0.04)
IMM GRANULOCYTES NFR BLD AUTO: 1 % (ref 0–0.5)
LYMPHOCYTES # BLD AUTO: 2.1 K/UL (ref 1–4.8)
LYMPHOCYTES NFR BLD: 16.4 % (ref 18–48)
MAGNESIUM SERPL-MCNC: 2.3 MG/DL (ref 1.6–2.6)
MCH RBC QN AUTO: 29 PG (ref 27–31)
MCHC RBC AUTO-ENTMCNC: 31.6 G/DL (ref 32–36)
MCV RBC AUTO: 92 FL (ref 82–98)
MONOCYTES # BLD AUTO: 0.8 K/UL (ref 0.3–1)
MONOCYTES NFR BLD: 6 % (ref 4–15)
NEUTROPHILS # BLD AUTO: 9.3 K/UL (ref 1.8–7.7)
NEUTROPHILS NFR BLD: 73.7 % (ref 38–73)
NRBC BLD-RTO: 0 /100 WBC
PLATELET # BLD AUTO: 194 K/UL (ref 150–450)
PMV BLD AUTO: 12.4 FL (ref 9.2–12.9)
POCT GLUCOSE: 104 MG/DL (ref 70–110)
POCT GLUCOSE: 131 MG/DL (ref 70–110)
POCT GLUCOSE: 174 MG/DL (ref 70–110)
POTASSIUM SERPL-SCNC: 4.1 MMOL/L (ref 3.5–5.1)
PROT SERPL-MCNC: 5.6 G/DL (ref 6–8.4)
RBC # BLD AUTO: 3.34 M/UL (ref 4–5.4)
SODIUM SERPL-SCNC: 144 MMOL/L (ref 136–145)
WBC # BLD AUTO: 12.6 K/UL (ref 3.9–12.7)

## 2022-04-22 PROCEDURE — 25000003 PHARM REV CODE 250: Performed by: INTERNAL MEDICINE

## 2022-04-22 PROCEDURE — 97110 THERAPEUTIC EXERCISES: CPT

## 2022-04-22 PROCEDURE — 97530 THERAPEUTIC ACTIVITIES: CPT

## 2022-04-22 PROCEDURE — 36415 COLL VENOUS BLD VENIPUNCTURE: CPT | Performed by: INTERNAL MEDICINE

## 2022-04-22 PROCEDURE — 85025 COMPLETE CBC W/AUTO DIFF WBC: CPT

## 2022-04-22 PROCEDURE — 94761 N-INVAS EAR/PLS OXIMETRY MLT: CPT

## 2022-04-22 PROCEDURE — 63600175 PHARM REV CODE 636 W HCPCS: Performed by: STUDENT IN AN ORGANIZED HEALTH CARE EDUCATION/TRAINING PROGRAM

## 2022-04-22 PROCEDURE — 80053 COMPREHEN METABOLIC PANEL: CPT

## 2022-04-22 PROCEDURE — 99239 HOSP IP/OBS DSCHRG MGMT >30: CPT | Mod: GC,,, | Performed by: INTERNAL MEDICINE

## 2022-04-22 PROCEDURE — 99239 PR HOSPITAL DISCHARGE DAY,>30 MIN: ICD-10-PCS | Mod: GC,,, | Performed by: INTERNAL MEDICINE

## 2022-04-22 PROCEDURE — 83735 ASSAY OF MAGNESIUM: CPT | Performed by: INTERNAL MEDICINE

## 2022-04-22 PROCEDURE — 27000221 HC OXYGEN, UP TO 24 HOURS

## 2022-04-22 PROCEDURE — 25000242 PHARM REV CODE 250 ALT 637 W/ HCPCS: Performed by: INTERNAL MEDICINE

## 2022-04-22 PROCEDURE — 99900035 HC TECH TIME PER 15 MIN (STAT)

## 2022-04-22 PROCEDURE — 25000003 PHARM REV CODE 250

## 2022-04-22 RX ORDER — COLCHICINE 0.6 MG/1
0.6 TABLET ORAL DAILY
Qty: 30 TABLET | Refills: 6 | Status: SHIPPED | OUTPATIENT
Start: 2022-04-22 | End: 2022-04-22 | Stop reason: HOSPADM

## 2022-04-22 RX ORDER — ALLOPURINOL 100 MG/1
50 TABLET ORAL EVERY OTHER DAY
Qty: 8 TABLET | Refills: 0 | Status: SHIPPED | OUTPATIENT
Start: 2022-04-23 | End: 2022-05-17 | Stop reason: SDUPTHER

## 2022-04-22 RX ORDER — COLCHICINE 0.6 MG/1
TABLET ORAL
Qty: 15 TABLET | Refills: 11 | Status: SHIPPED | OUTPATIENT
Start: 2022-04-22 | End: 2022-05-17 | Stop reason: SDUPTHER

## 2022-04-22 RX ORDER — ASPIRIN 81 MG/1
81 TABLET ORAL EVERY MORNING
Qty: 360 TABLET | Refills: 0 | Status: SHIPPED | OUTPATIENT
Start: 2022-04-22 | End: 2022-04-22 | Stop reason: SDUPTHER

## 2022-04-22 RX ORDER — TORSEMIDE 10 MG/1
10 TABLET ORAL DAILY
Qty: 30 TABLET | Refills: 11 | Status: SHIPPED | OUTPATIENT
Start: 2022-04-22 | End: 2022-05-17 | Stop reason: SDUPTHER

## 2022-04-22 RX ORDER — COLCHICINE 0.6 MG/1
0.6 TABLET ORAL EVERY OTHER DAY
Qty: 15 TABLET | Refills: 11 | Status: SHIPPED | OUTPATIENT
Start: 2022-04-22 | End: 2022-04-22 | Stop reason: SDUPTHER

## 2022-04-22 RX ORDER — TORSEMIDE 10 MG/1
10 TABLET ORAL DAILY
Status: DISCONTINUED | OUTPATIENT
Start: 2022-04-22 | End: 2022-04-22 | Stop reason: HOSPADM

## 2022-04-22 RX ADMIN — FLUTICASONE FUROATE AND VILANTEROL TRIFENATATE 1 PUFF: 100; 25 POWDER RESPIRATORY (INHALATION) at 10:04

## 2022-04-22 RX ADMIN — ISOSORBIDE MONONITRATE 60 MG: 60 TABLET, EXTENDED RELEASE ORAL at 09:04

## 2022-04-22 RX ADMIN — HEPARIN SODIUM 5000 UNITS: 5000 INJECTION INTRAVENOUS; SUBCUTANEOUS at 05:04

## 2022-04-22 RX ADMIN — HEPARIN SODIUM 5000 UNITS: 5000 INJECTION INTRAVENOUS; SUBCUTANEOUS at 02:04

## 2022-04-22 RX ADMIN — DILTIAZEM HYDROCHLORIDE 120 MG: 120 CAPSULE, COATED, EXTENDED RELEASE ORAL at 09:04

## 2022-04-22 RX ADMIN — TORSEMIDE 10 MG: 10 TABLET ORAL at 02:04

## 2022-04-22 RX ADMIN — ASPIRIN 81 MG: 81 TABLET, COATED ORAL at 09:04

## 2022-04-22 RX ADMIN — Medication 100 MG: at 09:04

## 2022-04-22 RX ADMIN — METOPROLOL SUCCINATE 50 MG: 50 TABLET, EXTENDED RELEASE ORAL at 09:04

## 2022-04-22 RX ADMIN — ATORVASTATIN CALCIUM 80 MG: 20 TABLET, FILM COATED ORAL at 09:04

## 2022-04-22 RX ADMIN — TICAGRELOR 90 MG: 90 TABLET ORAL at 10:04

## 2022-04-22 RX ADMIN — TIOTROPIUM BROMIDE INHALATION SPRAY 2 PUFF: 3.12 SPRAY, METERED RESPIRATORY (INHALATION) at 10:04

## 2022-04-22 RX ADMIN — PANTOPRAZOLE SODIUM 40 MG: 40 TABLET, DELAYED RELEASE ORAL at 09:04

## 2022-04-22 NOTE — PLAN OF CARE
[facility] - Cardiology Stepdown      HOME HEALTH ORDERS  FACE TO FACE ENCOUNTER    Patient Name: Marisol Kerr  YOB: 1945    PCP: Khadar Dwyer MD   PCP Address: [address] [place] [address] [address] [facility] / ID*  PCP Phone Number: [phone]  PCP Fax: [phone]    Encounter Date: 4/8/22    Admit to Home Health    Diagnoses:  Active Hospital Problems    Diagnosis  POA    *NSTEMI (non-ST elevated myocardial infarction) [I21.4]  Yes    Impaired functional mobility and endurance [Z74.09]  Yes    Gout [M10.9]  Unknown    Esophageal dysmotility [K22.4]  Yes    HAP (hospital-acquired pneumonia) [J18.9, Y95]  No    Normocytic anemia [D64.9]  Yes    Leukocytosis [D72.829]  Unknown    Chronic diastolic congestive heart failure [I50.32]  Yes    Chronic hypoxemic respiratory failure [J96.11]  Yes     3 L at home. Is on 3L during first day of admission.       CKD (chronic kidney disease), stage III [N18.30]  Yes    Gastroesophageal reflux disease without esophagitis [K21.9]  Yes    Essential hypertension, benign [I10]  Yes      Resolved Hospital Problems   No resolved problems to display.       Follow Up Appointments:  Future Appointments   Date Time Provider Department Center   4/27/2022 11:20 AM Antonieta Marquez NP [facility]       Allergies:  Review of patient's allergies indicates:   Allergen Reactions    Iodinated contrast media     Strawberries [strawberry] Hives and Itching       Medications: Review discharge medications with patient and family and provide education.    Current Facility-Administered Medications   Medication Dose Route Frequency Provider Last Rate Last Admin    acetaminophen tablet 650 mg  650 mg Oral Q4H PRN García Johnson MD   650 mg at 04/11/22 2111    albuterol-ipratropium 2.5 mg-0.5 mg/3 mL nebulizer solution 3 mL  3 mL Nebulization Q6H PRN Kaycee Mitchell MD        allopurinol split tablet 50 mg  50 mg Oral Every other day Uriel  MD Brooke   50 mg at 04/21/22 1111    aspirin EC tablet 81 mg  81 mg Oral QAM García Johnson MD   81 mg at 04/22/22 0936    atorvastatin tablet 80 mg  80 mg Oral Daily García Johnson MD   80 mg at 04/22/22 0925    benzonatate capsule 100 mg  100 mg Oral TID PRN Hermelindo Haney MD   100 mg at 04/17/22 2052    coenzyme Q10 100 mg  100 mg Oral QAM García Johnosn MD   100 mg at 04/22/22 0936    dextrose 10% bolus 125 mL  12.5 g Intravenous PRN Karl Ontiveros MD        dextrose 10% bolus 250 mL  25 g Intravenous PRN Karl Ontiveros MD        diclofenac sodium 1 % gel 4 g  4 g Topical (Top) TID PRN Amari Argueta MD   4 g at 04/10/22 1311    diltiaZEM 24 hr capsule 120 mg  120 mg Oral Daily Yassine Cueva MD   120 mg at 04/22/22 0926    ergocalciferol capsule 50,000 Units  50,000 Units Oral Q7 Days García Johnson MD   50,000 Units at 04/16/22 0835    fluticasone furoate-vilanteroL 100-25 mcg/dose diskus inhaler 1 puff  1 puff Inhalation Daily García Johnson MD   1 puff at 04/22/22 1012    gabapentin capsule 100 mg  100 mg Oral QHS García Johnson MD   100 mg at 04/21/22 2008    glucagon (human recombinant) injection 1 mg  1 mg Intramuscular PRN Amari Argueta MD        glucose chewable tablet 16 g  16 g Oral PRN Amari Argueta MD        glucose chewable tablet 24 g  24 g Oral PRN Amari Argueta MD        heparin (porcine) injection 5,000 Units  5,000 Units Subcutaneous Q8H Uriel Cox MD   5,000 Units at 04/22/22 0554    insulin aspart U-100 pen 0-5 Units  0-5 Units Subcutaneous QID (AC + HS) PRN Amari Argueta MD   1 Units at 04/19/22 2040    isosorbide mononitrate 24 hr tablet 60 mg  60 mg Oral Daily Yassine Cueva MD   60 mg at 04/22/22 0926    loperamide capsule 2 mg  2 mg Oral QID PRN Estrellita Salinas MD   2 mg at 04/19/22 0838    metoprolol succinate (TOPROL-XL) 24 hr tablet 50 mg  50 mg Oral Daily García Johnson MD   50 mg at 04/22/22 0926    nitroGLYCERIN SL  tablet 0.4 mg  0.4 mg Sublingual Q5 Min PRN García Johnson MD   0.4 mg at 04/16/22 0817    ondansetron injection 4 mg  4 mg Intravenous Q8H PRN García Johnson MD   4 mg at 04/12/22 2145    pantoprazole EC tablet 40 mg  40 mg Oral Daily Estrellita Salinas MD   40 mg at 04/22/22 0925    polyethylene glycol packet 17 g  17 g Oral Daily García Johnson MD        sodium chloride 0.9% flush 10 mL  10 mL Intravenous PRN García Johnson MD        ticagrelor tablet 90 mg  90 mg Oral BID García Johnson MD   90 mg at 04/22/22 1017    tiotropium bromide 2.5 mcg/actuation inhaler 2 puff  2 puff Inhalation Daily García Johnson MD   2 puff at 04/22/22 1016    torsemide tablet 10 mg  10 mg Oral Daily Estrellita Salinas MD        white petrolatum 41 % ointment   Topical (Top) PRN Estrellita Salinas MD   Given at 04/11/22 2111    zolpidem tablet 5 mg  5 mg Oral Nightly PRN García Johnson MD   5 mg at 04/15/22 2127     Current Discharge Medication List      START taking these medications    Details   allopurinoL (ZYLOPRIM) 100 MG tablet Take 0.5 tablets (50 mg total) by mouth every other day.  Qty: 8 tablet, Refills: 0      diltiaZEM (CARDIZEM CD) 120 MG Cp24 Take 1 capsule (120 mg total) by mouth once daily.  Qty: 30 capsule, Refills: 11    Comments: .      torsemide (DEMADEX) 10 MG Tab Take 1 tablet (10 mg total) by mouth once daily.  Qty: 30 tablet, Refills: 11         CONTINUE these medications which have CHANGED    Details   aspirin (ECOTRIN) 81 MG EC tablet Take 1 tablet (81 mg total) by mouth every morning.  Qty: 360 tablet, Refills: 0      cholestyramine (QUESTRAN) 4 gram packet Take 1 packet (4 g total) by mouth 2 (two) times daily.  Qty: 180 packet, Refills: 3      isosorbide mononitrate (IMDUR) 60 MG 24 hr tablet Take 1 tablet (60 mg total) by mouth once daily.  Qty: 30 tablet, Refills: 11      ticagrelor (BRILINTA) 90 mg tablet Take 1 tablet (90 mg total) by mouth 2 (two) times a day.  Qty: 720 tablet, Refills: 0          CONTINUE these medications which have NOT CHANGED    Details   acetaminophen (TYLENOL) 325 MG tablet Take 2 tablets (650 mg total) by mouth every 4 (four) hours as needed.  Refills: 0      albuterol (VENTOLIN HFA) 90 mcg/actuation inhaler Inhale 2 puffs into the lungs every 6 (six) hours as needed for Wheezing or Shortness of Breath. Rescue  Qty: 36 g, Refills: 3    Associated Diagnoses: Panlobular emphysema      atorvastatin (LIPITOR) 80 MG tablet Take 80 mg by mouth once daily.      ergocalciferol (VITAMIN D2) 50,000 unit Cap Take 1 capsule (50,000 Units total) by mouth every 7 days.  Qty: 12 capsule, Refills: 1    Associated Diagnoses: Vitamin D deficiency      ferrous sulfate 325 (65 FE) MG EC tablet Take 1 tablet (325 mg total) by mouth once daily.  Refills: 0      fish oil-omega-3 fatty acids 300-1,000 mg capsule Take 1 capsule by mouth every morning.      ipratropium-albuteroL (COMBIVENT RESPIMAT)  mcg/actuation inhaler Inhale 2 puffs into the lungs every 4 (four) hours as needed for Shortness of Breath. Rescue  Qty: 12 g, Refills: 3    Comments: Please consider 90 day supplies to promote better adherence  Associated Diagnoses: Chronic hypoxemic respiratory failure      metoprolol succinate (TOPROL-XL) 50 MG 24 hr tablet Take 1 tablet (50 mg total) by mouth once daily.  Qty: 45 tablet, Refills: 1    Comments: .  Associated Diagnoses: Essential hypertension, benign      nitroGLYCERIN 0.4 MG/DOSE TL SPRY (NITROLINGUAL) 400 mcg/spray spray USE ONE SPRAY UNDER THE TONGUE EVERY 5 MINUTES AS NEEDED FOR CHEST PAIN.  DO NOT EXCEED A TOTAL OF 3 SPRAYS IN 15 MINUTES  Qty: 12 g, Refills: 0      pantoprazole (PROTONIX) 40 MG tablet Take 1 tablet (40 mg total) by mouth once daily.  Qty: 30 tablet, Refills: 11      triamcinolone acetonide 0.1% (KENALOG) 0.1 % cream Apply topically 2 (two) times daily. For legs.  Qty: 453 g, Refills: 1    Associated Diagnoses: Venous stasis dermatitis of both lower extremities       coenzyme Q10 100 mg capsule Take 100 mg by mouth every morning.      fluticasone-salmeterol diskus inhaler 250-50 mcg Inhale 1 puff into the lungs 2 (two) times daily.  Qty: 3 each, Refills: 1      gabapentin (NEURONTIN) 100 MG capsule Take 1 capsule (100 mg total) by mouth every evening.  Qty: 30 capsule, Refills: 3      NARCAN 4 mg/actuation Spry       ondansetron (ZOFRAN-ODT) 4 MG TbDL Take 2 tablets (8 mg total) by mouth every 6 (six) hours as needed.  Qty: 100 tablet, Refills: 3    Associated Diagnoses: Gastroesophageal reflux disease without esophagitis      temazepam (RESTORIL) 15 mg Cap Take 15 mg by mouth nightly as needed.      umeclidinium (INCRUSE ELLIPTA) 62.5 mcg/actuation inhalation capsule Inhale 62.5 mcg into the lungs every morning. Controller  Qty: 30 each, Refills: 11    Associated Diagnoses: Chronic hypoxemic respiratory failure      vit C/E/Zn/coppr/lutein/zeaxan (PRESERVISION AREDS-2 ORAL) Take 1 tablet by mouth once daily.         STOP taking these medications       amLODIPine (NORVASC) 10 MG tablet Comments:   Reason for Stopping:         benazepriL (LOTENSIN) 40 MG tablet Comments:   Reason for Stopping:         furosemide (LASIX) 20 MG tablet Comments:   Reason for Stopping:         lisinopriL 10 MG tablet Comments:   Reason for Stopping:         lovastatin (MEVACOR) 40 MG tablet Comments:   Reason for Stopping:         nitroGLYCERIN 0.2 mg/hr TD PT24 (NITRODUR) 0.2 mg/hr Comments:   Reason for Stopping:         NON FORMULARY MEDICATION Comments:   Reason for Stopping:                 I have seen and examined this patient within the last 30 days. My clinical findings that support the need for the home health skilled services and home bound status are the following:no   Weakness/numbness causing balance and gait disturbance due to Heart Failure making it taxing to leave home.     Diet:   cardiac diet and renal diet    Labs:  Report Lab results to PCP.    Referrals/ Consults  Physical  Therapy to evaluate and treat. Evaluate for home safety and equipment needs; Establish/upgrade home exercise program. Perform / instruct on therapeutic exercises, gait training, transfer training, and Range of Motion.  Occupational Therapy to evaluate and treat. Evaluate home environment for safety and equipment needs. Perform/Instruct on transfers, ADL training, ROM, and therapeutic exercises.    Activities:   activity as tolerated    Nursing:   Agency to admit patient within 24 hours of hospital discharge unless specified on physician order or at patient request    SN to complete comprehensive assessment including routine vital signs. Instruct on disease process and s/s of complications to report to MD. Review/verify medication list sent home with the patient at time of discharge  and instruct patient/caregiver as needed. Frequency may be adjusted depending on start of care date.     Skilled nurse to perform up to 3 visits PRN for symptoms related to diagnosis    Notify MD if SBP > 160 or < 90; DBP > 90 or < 50; HR > 120 or < 50; Temp > 101; O2 < 88%    Ok to schedule additional visits based on staff availability and patient request on consecutive days within the home health episode.    When multiple disciplines ordered:    Start of Care occurs on Sunday - Wednesday schedule remaining discipline evaluations as ordered on separate consecutive days following the start of care.    Thursday SOC -schedule subsequent evaluations Friday and Monday the following week.     Friday - Saturday SOC - schedule subsequent discipline evaluations on consecutive days starting Monday of the following week.    For all post-discharge communication and subsequent orders please contact patient's primary care physician.     Home Health Aide:  Physical Therapy Three times weekly and Occupational Therapy Three times weekly    Wound Care Orders  no    I certify that this patient is confined to her home and needs physical therapy and  occupational therapy.

## 2022-04-22 NOTE — DISCHARGE SUMMARY
[facility] - Cardiology Stepdown  Cardiology  Discharge Summary      Patient Name: Marisol Kerr  MRN: [id number]  Admission Date: 4/8/2022  Hospital Length of Stay: 14 days  Discharge Date and Time:  04/22/2022 1:59 PM  Attending Physician: Karl Ontiveros MD    Discharging Provider: Estrellita Salinas MD  Primary Care Physician: Khadar Dwyer MD    HPI:   76 year old female with PMHx of DM2, HTN, HLD, CKD, COPD on home O2  recent STEMI s/p LHC who is not amenable for CABG presenting with chest pressure.     She had left heart catheterization 2/2022 and had complication with rectus sheet hematoma/bleeding and transferred to [facility] for IR intervention and embolization was done recently and admitted to rehab there and got discharged recently.    Her chest pressure occurred while showering this morning and felt similar to prior episodes. She describes it a pressure in the middle of her chest without radiation. Resolved with SL Nitro and taken to ED.     Mild troponin elevation and admitted at [place]. EKG with lateral ST depressions that have since resolved. Patient had appointment scheduled 4/8 with Dr Westley Chappell for planning of possible  interventionist.    Transferred to [facility] for further mgmt and interventional cardiology. Afebrile, hemodynamically stable and asymptomatic on arrival. Not on heparin gtt. Reloaded with DAPT and ACS protocol.       Procedure(s) (LRB):  EGD (ESOPHAGOGASTRODUODENOSCOPY) (N/A)     Indwelling Lines/Drains at time of discharge:  Lines/Drains/Airways     Drain  Duration           Female External Urinary Catheter 03/27/22 2000 25 days                Hospital Course:  Patient was asymptomatic on DAPT and Heparin, however, heparin was discontinued on arrival in the setting of known history of rectus sheath hematoma which occurred as a spontaneous complication post-LHC. Patient originally scheduled to undergo high risk PCI whilst inpatient w/ Dr. Chappell but as per interventional cardiology, this  will now take place in the outpatient setting. Of note, patient has developed worsening cough w/ increased clear sputum production and pleuritic chest pain. She remains afebrile and overall non infectious appearing. Repeat CXR w/ increased RML and RLL infiltrates. Patient's cough has not improved w/ symptomatic treatment. Given her high risk of decompensation, s/p HAP treatment w/ azithromycin and Zosyn. Treated for CHF exacerbation w/ IV lasix w/ good improvement in symptoms. Additionally, concern for microaspiration 2/2 ?oropharyngeal dysphasia vs globus sensation - evaluated by SLP and GI evaluation recommended. Esophogram done 4/14/22 shows esophageal dysmotility characterized by dimished secondary waves and presence of tertiary contractions. Cr 1.4 --> 2.0; likely pre-renal in the setting of decreased intake. Will administer IVFs PRN and continue to monitor. Symptoms improved w/ diltiazem and patient now tolerating PO intake w/ no issues. Cr improved. Will discharge home w/ HH PT/OT and follow up BNP given new prescription of torsemide 10mg QD. Close follow up w/ cardiology, interventional cardiology and GI.       Goals of Care Treatment Preferences:  Code Status: Full Code      Consults:   Consults (From admission, onward)        Status Ordering Provider     Inpatient consult to Physical Medicine Rehab  Once        Provider:  (Not yet assigned)    Completed EDITH HAGER     Inpatient consult to Registered Dietitian/Nutritionist  Once        Provider:  (Not yet assigned)    Completed EDITH HAGER     Inpatient consult to Midline team  Once        Provider:  (Not yet assigned)    Completed ANIYA DAVILA     Inpatient consult to Gastroenterology  Once        Provider:  (Not yet assigned)    Completed ANIYA DAVILA     Inpatient consult to Interventional Cardiology  Once        Provider:  (Not yet assigned)    Completed JAGJIT ALLAN        Vitals:    04/22/22 1012 04/22/22 1016 04/22/22  1124 04/22/22 1140   BP:    135/62   BP Location:    Left arm   Patient Position:    Sitting   Pulse: 79 79 68 68   Resp: 18 18  20   Temp:    97.6 °F (36.4 °C)   TempSrc:    Oral   SpO2:    99%   Weight:       Height:         Physical Exam  Vitals and nursing note reviewed.   Constitutional:       Appearance: Normal appearance.   HENT:      Head: Normocephalic and atraumatic.      Nose: Nose normal.      Mouth/Throat:      Mouth: Mucous membranes are moist.      Pharynx: Oropharynx is clear.   Eyes:      Extraocular Movements: Extraocular movements intact.   Cardiovascular:      Rate and Rhythm: Normal rate and regular rhythm.      Pulses: Normal pulses.      Heart sounds: Normal heart sounds.   Pulmonary:      Effort: Pulmonary effort is normal.      Breath sounds: Normal breath sounds. No wheezing or rales.   Abdominal:      General: Bowel sounds are normal.      Palpations: Abdomen is soft.      Tenderness: There is no abdominal tenderness.      Hernia: A hernia (R hernia abdominal hernia, reducable, non-painful) is present.   Musculoskeletal:         General: Normal range of motion.      Cervical back: Normal range of motion and neck supple.      Right lower leg: No edema.      Left lower leg: No edema.   Skin:     General: Skin is warm and dry.   Neurological:      General: No focal deficit present.      Mental Status: She is alert and oriented to person, place, and time.   Psychiatric:         Mood and Affect: Mood normal.         Behavior: Behavior normal.     Significant Diagnostic Studies: Labs:   CMP   Recent Labs   Lab 04/21/22  0358 04/22/22  0526    144   K 4.0 4.1    105   CO2 30* 30*   GLU 74 98   BUN 73* 65*   CREATININE 2.4* 1.7*   CALCIUM 8.7 8.8   PROT 5.7* 5.6*   ALBUMIN 2.6* 2.5*   BILITOT 0.3 0.2   ALKPHOS 68 77   AST 19 30   ALT 24 39   ANIONGAP 10 9   ESTGFRAFRICA 22.3* 33.8*   EGFRNONAA 19.3* 29.3*    and CBC   Recent Labs   Lab 04/21/22  0358 04/22/22  0526   WBC 13.33* 12.60    HGB 9.5* 9.7*   HCT 30.5* 30.7*    194       Pending Diagnostic Studies:     None          Final Active Diagnoses:    Diagnosis Date Noted POA    PRINCIPAL PROBLEM:  NSTEMI (non-ST elevated myocardial infarction) [I21.4] 02/11/2022 Yes    Impaired functional mobility and endurance [Z74.09] 04/19/2022 Yes    Gout [M10.9] 04/17/2022 Unknown    Esophageal dysmotility [K22.4] 04/15/2022 Yes    HAP (hospital-acquired pneumonia) [J18.9, Y95] 04/13/2022 No    Normocytic anemia [D64.9] 04/08/2022 Yes    Leukocytosis [D72.829] 09/18/2019 Unknown    Chronic diastolic congestive heart failure [I50.32] 09/17/2019 Yes    Chronic hypoxemic respiratory failure [J96.11] 01/16/2019 Yes    CKD (chronic kidney disease), stage III [N18.30] 10/08/2014 Yes    Gastroesophageal reflux disease without esophagitis [K21.9] 06/03/2013 Yes    Essential hypertension, benign [I10] 04/09/2013 Yes      Problems Resolved During this Admission:     No new Assessment & Plan notes have been filed under this hospital service since the last note was generated.  Service: Cardiology      Discharged Condition: stable    Disposition: Home-Health Care c    Follow Up:    Patient Instructions:      BASIC METABOLIC PANEL   Standing Status: Future Standing Exp. Date: 06/21/23     Ambulatory referral/consult to Gastroenterology   Standing Status: Future   Referral Priority: Routine Referral Type: Consultation   Referral Reason: Specialty Services Required   Requested Specialty: Gastroenterology   Number of Visits Requested: 1     Ambulatory referral/consult to Cardiology   Standing Status: Future   Referral Priority: Routine Referral Type: Consultation   Referral Reason: Specialty Services Required   Requested Specialty: Cardiology   Number of Visits Requested: 1     Ambulatory referral/consult to Interventional Cardiology   Standing Status: Future   Referral Priority: Routine Referral Type: Consultation   Referral Reason: Specialty Services  Required   Requested Specialty: Interventional Cardiology   Number of Visits Requested: 1     Medications:  Reconciled Home Medications:      Medication List      START taking these medications    allopurinoL 100 MG tablet  Commonly known as: ZYLOPRIM  Take 0.5 tablets (50 mg total) by mouth every other day.  Start taking on: April 23, 2022     aspirin 81 MG EC tablet  Commonly known as: ECOTRIN  Take 1 tablet (81 mg total) by mouth every morning.     colchicine 0.6 mg tablet  Commonly known as: COLCRYS  Take 1 tablet (0.6 mg total) by mouth every other day.     diltiaZEM 120 MG Cp24  Commonly known as: CARDIZEM CD  Take 1 capsule (120 mg total) by mouth once daily.     torsemide 10 MG Tab  Commonly known as: DEMADEX  Take 1 tablet (10 mg total) by mouth once daily.        CHANGE how you take these medications    isosorbide mononitrate 60 MG 24 hr tablet  Commonly known as: IMDUR  Take 1 tablet (60 mg total) by mouth once daily.  What changed:   · medication strength  · how much to take     nitroGLYCERIN 0.4 MG/DOSE TL SPRY 400 mcg/spray spray  Commonly known as: NITROLINGUAL  USE ONE SPRAY UNDER THE TONGUE EVERY 5 MINUTES AS NEEDED FOR CHEST PAIN.  DO NOT EXCEED A TOTAL OF 3 SPRAYS IN 15 MINUTES  What changed: Another medication with the same name was removed. Continue taking this medication, and follow the directions you see here.        CONTINUE taking these medications    acetaminophen 325 MG tablet  Commonly known as: TYLENOL  Take 2 tablets (650 mg total) by mouth every 4 (four) hours as needed.     albuterol 90 mcg/actuation inhaler  Commonly known as: VENTOLIN HFA  Inhale 2 puffs into the lungs every 6 (six) hours as needed for Wheezing or Shortness of Breath. Rescue     atorvastatin 80 MG tablet  Commonly known as: LIPITOR  Take 80 mg by mouth once daily.     cholestyramine 4 gram packet  Commonly known as: QUESTRAN  Take 1 packet (4 g total) by mouth 2 (two) times daily.     coenzyme Q10 100 mg  capsule  Take 100 mg by mouth every morning.     CombiVENT RESPIMAT  mcg/actuation inhaler  Generic drug: ipratropium-albuteroL  Inhale 2 puffs into the lungs every 4 (four) hours as needed for Shortness of Breath. Rescue     ergocalciferol 50,000 unit Cap  Commonly known as: VITAMIN D2  Take 1 capsule (50,000 Units total) by mouth every 7 days.     ferrous sulfate 325 (65 FE) MG EC tablet  Take 1 tablet (325 mg total) by mouth once daily.     fish oil-omega-3 fatty acids 300-1,000 mg capsule  Take 1 capsule by mouth every morning.     fluticasone-salmeterol 250-50 mcg/dose 250-50 mcg/dose diskus inhaler  Commonly known as: ADVAIR DISKUS  Inhale 1 puff into the lungs 2 (two) times daily.     gabapentin 100 MG capsule  Commonly known as: NEURONTIN  Take 1 capsule (100 mg total) by mouth every evening.     INCRUSE ELLIPTA 62.5 mcg/actuation inhalation capsule  Generic drug: umeclidinium  Inhale 62.5 mcg into the lungs every morning. Controller     metoprolol succinate 50 MG 24 hr tablet  Commonly known as: TOPROL-XL  Take 1 tablet (50 mg total) by mouth once daily.     NARCAN 4 mg/actuation Spry  Generic drug: naloxone     ondansetron 4 MG Tbdl  Commonly known as: ZOFRAN-ODT  Take 2 tablets (8 mg total) by mouth every 6 (six) hours as needed.     pantoprazole 40 MG tablet  Commonly known as: PROTONIX  Take 1 tablet (40 mg total) by mouth once daily.     PRESERVISION AREDS-2 ORAL  Take 1 tablet by mouth once daily.     temazepam 15 mg Cap  Commonly known as: RESTORIL  Take 15 mg by mouth nightly as needed.     ticagrelor 90 mg tablet  Commonly known as: BRILINTA  Take 1 tablet (90 mg total) by mouth 2 (two) times a day.     triamcinolone acetonide 0.1% 0.1 % cream  Commonly known as: KENALOG  Apply topically 2 (two) times daily. For legs.        STOP taking these medications    amLODIPine 10 MG tablet  Commonly known as: NORVASC     benazepriL 40 MG tablet  Commonly known as: LOTENSIN     furosemide 20 MG  tablet  Commonly known as: LASIX     lisinopriL 10 MG tablet     lovastatin 40 MG tablet  Commonly known as: MEVACOR     NON FORMULARY MEDICATION            Time spent on the discharge of patient: 35 minutes    Estrellita Salinas MD  Cardiology  [facility] - Cardiology Stepdown

## 2022-04-22 NOTE — PT/OT/SLP PROGRESS
Occupational Therapy   Treatment    Name: Marisol Kerr  MRN: [id number]  Admitting Diagnosis:  NSTEMI (non-ST elevated myocardial infarction)  8 Days Post-Op    Recommendations:     Discharge Recommendations: home health PT, home health OT  Discharge Equipment Recommendations:  none  Barriers to discharge:  None    Assessment:     Marisol Kerr is a 76 y.o. female with a medical diagnosis of NSTEMI (non-ST elevated myocardial infarction).  She presents with  weakness, impaired endurance, impaired self care skills, impaired functional mobilty, impaired balance, decreased upper extremity function, decreased lower extremity function, decreased safety awareness, pain.     Rehab Prognosis:  Good; patient would benefit from acute skilled OT services to address these deficits and reach maximum level of function.       Plan:     Patient to be seen 3 x/week to address the above listed problems via self-care/home management, therapeutic activities, therapeutic exercises  · Plan of Care Expires: 04/29/22  · Plan of Care Reviewed with: patient    Subjective     Pain/Comfort:  · Pain Rating 1:  (unrated pain to buttocks)  · Pain Addressed 1: Cessation of Activity, Nurse notified, Reposition, Distraction    Objective:     Communicated with: RN prior to session.  Patient found up in chair with telemetry, peripheral IV, PureWick upon OT entry to room.    General Precautions: Standard, fall   Orthopedic Precautions:N/A   Braces: N/A  Respiratory Status: Nasal cannula, flow 2 L/min     Occupational Performance:     Bed Mobility:    · Not tested; pt received and left sitting in chair      Functional Mobility/Transfers:  · Pt politely declined standing trials/transfers/functional mobility 2/2 pain on buttocks from rashy skin. Pt agreeable to chair level therex.     Therapeutic Activity:  · Pt assisted with contacting daughter via phone to communicate care plan updated.     Therapeutic Exercise:  · Pt performed BUE therex while seated in  chair. B shoulder flexion x20, B scapular protraction/retraction x20, B rows forward/backwards x40 total. Pt also performed abdominal therex while seated to brace core and perform trunk rotation x10.   · Therex performed to improve strength/endurance for increased activity tolerance and independence/safety with ADLs .      [facility] 6 Click ADL: 21    Treatment & Education:  OT role, plan of care, BUE therex, importance of continued OOB activity, pain management, safety precautions     Patient left up in chair with all lines intact, call button in reach and RN notifiedEducation:      GOALS:   Multidisciplinary Problems     Occupational Therapy Goals        Problem: Occupational Therapy    Goal Priority Disciplines Outcome Interventions   Occupational Therapy Goal     OT, PT/OT Ongoing, Progressing    Description: Goals to be met by: 5/12/22     Patient will increase functional independence with ADLs by performing:    UE Dressing with Set-up Assistance.  LE Dressing with Stand-by Assistance.  Grooming while standing at sink with Set-up Assistance.  Toileting from toilet with Modified Smith for hygiene and clothing management.   Supine to sit with Modified Smith.  Step transfer with Modified Smith  Toilet transfer to toilet with Modified Smith.                     Time Tracking:     OT Date of Treatment: 04/22/22  OT Start Time: 0940  OT Stop Time: 1005  OT Total Time (min): 25 min    Billable Minutes:Therapeutic Activity 10 minutes  Therapeutic Exercise 15 minutes    OT/LONA: OT          4/22/2022

## 2022-04-22 NOTE — PLAN OF CARE
Pt is AAOx4. POC reviewed at bedside. VS stable, afebrile throughout shift.  Pt denies any chest pain or palpitation, no s/s of respiratory distress.  On 2LO2 via NC. Purewick in place. Pt has remained free from injury during this shift. Pt bed in low locked position. Call light and personal belongings within reach. Side rails up x2.  Pt was advice to call nurse with any questions or concerns. Will continue to monitor

## 2022-04-22 NOTE — NURSING
Patient is ready for discharge. Patient stable alert and oriented. IVs removed. No complaints of pain.Reviewed medications and side effects, appointments, and answered questions with patient and daughter.

## 2022-04-22 NOTE — PLAN OF CARE
[facility] - Cardiology Stepdown  Discharge Final Note    Primary Care Provider: Khadar Dwyer MD    Expected Discharge Date: 4/22/2022    Final Discharge Note (most recent)       Final Note - 04/22/22 1324          Final Note    Assessment Type Final Discharge Note (P)      Anticipated Discharge Disposition Home-Health Care Svc (P)    [facility] [place] (Accepted)       Post-Acute Status    Post-Acute Authorization Home Health (P)      Home Health Status Set-up Complete/Auth obtained (P)      Discharge Delays None known at this time (P)                      Important Message from Medicare  Important Message from Medicare regarding Discharge Appeal Rights: Given to patient/caregiver, Explained to patient/caregiver, Signed/date by patient/caregiver     Date IMM was signed: 04/22/22  Time IMM was signed: 0923    Bela Medina LMSW  Case Management Social Worker   [facility], [address]

## 2022-04-25 ENCOUNTER — PATIENT OUTREACH (OUTPATIENT)
Dept: ADMINISTRATIVE | Facility: CLINIC | Age: 77
End: 2022-04-25
Payer: MEDICARE

## 2022-04-25 ENCOUNTER — TELEPHONE (OUTPATIENT)
Dept: FAMILY MEDICINE | Facility: CLINIC | Age: 77
End: 2022-04-25

## 2022-04-25 NOTE — PATIENT INSTRUCTIONS
[facility] teaching reviewed with Marisol Kerr . She verbalized understanding.    Education was provided based on the patient's discharge diagnosis using the attached [facility] patient education as a reference.

## 2022-04-25 NOTE — TELEPHONE ENCOUNTER
Call patient - needs post-hospital phone call within 2 business days and hospital follow up visit scheduled within 7-14 days. Looks like someone already scheduled her with Antonieta on the 27th BUT NOT in a HFU spot. Please reschedule appt

## 2022-04-25 NOTE — PROGRESS NOTES
C3 nurse spoke with Marisol Kerr for a TCC post hospital discharge follow up call. The patient has a scheduled [facility] appointment with Antonieta Marquez NP on 04/27/22 @ 1120.

## 2022-04-25 NOTE — TELEPHONE ENCOUNTER
JESSIE I moved the appt. Just make sure pt is aware of the new time when you do the outreach call.

## 2022-04-26 ENCOUNTER — TELEPHONE (OUTPATIENT)
Dept: FAMILY MEDICINE | Facility: CLINIC | Age: 77
End: 2022-04-26

## 2022-04-26 NOTE — TELEPHONE ENCOUNTER
----- Message from Tarah Washington sent at 4/26/2022  1:35 PM CDT -----  Patient cancelled her Hospital Follow Up for tomorrow. She said her daughter can't bring her to this appointment because she has to work tomorrow. She said she needs an earlier appointment time. She was scheduled at 2:00 p.m. Patient's callback number is [phone].

## 2022-04-27 ENCOUNTER — PATIENT OUTREACH (OUTPATIENT)
Dept: FAMILY MEDICINE | Facility: CLINIC | Age: 77
End: 2022-04-27

## 2022-04-27 NOTE — PROGRESS NOTES
Discharge Information     Discharge Date:  04/25/2022     Primary Discharge Diagnosis:  CAD    Discharge Summary:  Unavailable      Medication & Order Review     Were medication changes made or new medications added?   Yes    If so, has the patient filled the prescriptions?  Yes     Was Home Health ordered? No    If so, has Home Health contacted patient and/or initiated services?  No    Name of Home Health Agency? N/A    Durable Medical Equipment ordered?  No     If so, has the DME provider contacted patient and delivered equipment?  N/A    Follow Up               Any problems since discharge? No    How is the patient feeling since returning home?  Pt states she feels good.     Have you set up recommended follow up appointments?  (cardiology,Yes    Schedule Hospital Follow-up appointment on 04/27/2022           Chelle Long

## 2022-04-28 ENCOUNTER — TELEPHONE (OUTPATIENT)
Dept: FAMILY MEDICINE | Facility: CLINIC | Age: 77
End: 2022-04-28

## 2022-04-28 NOTE — TELEPHONE ENCOUNTER
----- Message from Zakia Beck sent at 4/28/2022  4:23 PM CDT -----  Aurora reddy/ [facility] calling b/c she has questions about pt meds.  [phone]

## 2022-05-02 ENCOUNTER — TELEPHONE (OUTPATIENT)
Dept: FAMILY MEDICINE | Facility: CLINIC | Age: 77
End: 2022-05-02

## 2022-05-02 ENCOUNTER — OFFICE VISIT (OUTPATIENT)
Dept: FAMILY MEDICINE | Facility: CLINIC | Age: 77
End: 2022-05-02
Payer: MEDICARE

## 2022-05-02 VITALS
WEIGHT: 172 LBS | DIASTOLIC BLOOD PRESSURE: 70 MMHG | SYSTOLIC BLOOD PRESSURE: 136 MMHG | BODY MASS INDEX: 33.77 KG/M2 | HEART RATE: 68 BPM | HEIGHT: 60 IN

## 2022-05-02 DIAGNOSIS — N18.32 STAGE 3B CHRONIC KIDNEY DISEASE: ICD-10-CM

## 2022-05-02 DIAGNOSIS — Z12.31 OTHER SCREENING MAMMOGRAM: ICD-10-CM

## 2022-05-02 DIAGNOSIS — S30.1XXA HEMATOMA OF RECTUS SHEATH, INITIAL ENCOUNTER: ICD-10-CM

## 2022-05-02 DIAGNOSIS — I50.32 CHRONIC DIASTOLIC CONGESTIVE HEART FAILURE: Primary | ICD-10-CM

## 2022-05-02 DIAGNOSIS — E78.00 PURE HYPERCHOLESTEROLEMIA: ICD-10-CM

## 2022-05-02 DIAGNOSIS — Z09 HOSPITAL DISCHARGE FOLLOW-UP: ICD-10-CM

## 2022-05-02 DIAGNOSIS — M81.0 OSTEOPOROSIS, POST-MENOPAUSAL: ICD-10-CM

## 2022-05-02 DIAGNOSIS — I25.118 CORONARY ARTERY DISEASE OF NATIVE ARTERY OF NATIVE HEART WITH STABLE ANGINA PECTORIS: ICD-10-CM

## 2022-05-02 DIAGNOSIS — J96.11 CHRONIC RESPIRATORY FAILURE WITH HYPOXIA: ICD-10-CM

## 2022-05-02 DIAGNOSIS — J96.11 CHRONIC HYPOXEMIC RESPIRATORY FAILURE: ICD-10-CM

## 2022-05-02 DIAGNOSIS — I25.110 ATHEROSCLEROSIS OF NATIVE CORONARY ARTERY OF NATIVE HEART WITH UNSTABLE ANGINA PECTORIS: ICD-10-CM

## 2022-05-02 DIAGNOSIS — Z78.0 MENOPAUSE: ICD-10-CM

## 2022-05-02 DIAGNOSIS — J44.9 CHRONIC OBSTRUCTIVE PULMONARY DISEASE, UNSPECIFIED COPD TYPE: Chronic | ICD-10-CM

## 2022-05-02 DIAGNOSIS — R60.0 LOCALIZED EDEMA: ICD-10-CM

## 2022-05-02 DIAGNOSIS — K21.9 GASTROESOPHAGEAL REFLUX DISEASE WITHOUT ESOPHAGITIS: ICD-10-CM

## 2022-05-02 DIAGNOSIS — M16.0 PRIMARY OSTEOARTHRITIS OF BOTH HIPS: ICD-10-CM

## 2022-05-02 PROCEDURE — 99495 TCM SERVICES (MODERATE COMPLEXITY): ICD-10-PCS | Mod: S$GLB,,, | Performed by: FAMILY MEDICINE

## 2022-05-02 PROCEDURE — 99495 TRANSJ CARE MGMT MOD F2F 14D: CPT | Mod: S$GLB,,, | Performed by: FAMILY MEDICINE

## 2022-05-02 RX ORDER — ATORVASTATIN CALCIUM 80 MG/1
80 TABLET, FILM COATED ORAL DAILY
Qty: 90 TABLET | Refills: 1 | Status: SHIPPED | OUTPATIENT
Start: 2022-05-02 | End: 2022-10-31

## 2022-05-02 NOTE — PROGRESS NOTES
SUBJECTIVE:    Patient ID: Marisol Kerr is a 76 y.o. female.    Chief Complaint: Hospital Follow Up (Did not bring bottles. Ac )    Patient was admitted to the hospital for evaluation of chest pain.  In February she had a cardiac catheterization for a STEMI but had a rectus sheath hematoma.  He was felt patient may have pneumonia flu she was treated with antibiotics but she responded better to diuretics.  She spent 1 month in rehab on [facility] near [facility] she lost 20 lb after diuresing.  Dr. Chappell  [facility]      Hospital Course:  Patient was asymptomatic on DAPT and Heparin, however, heparin was discontinued on arrival in the setting of known history of rectus sheath hematoma which occurred as a spontaneous complication post-LHC. Patient originally scheduled to undergo high risk PCI whilst inpatient w/ Dr. Chappell but as per interventional cardiology, this will now take place in the outpatient setting. Of note, patient has developed worsening cough w/ increased clear sputum production and pleuritic chest pain. She remains afebrile and overall non infectious appearing. Repeat CXR w/ increased RML and RLL infiltrates. Patient's cough has not improved w/ symptomatic treatment. Given her high risk of decompensation, s/p HAP treatment w/ azithromycin and Zosyn. Treated for CHF exacerbation w/ IV lasix w/ good improvement in symptoms. Additionally, concern for microaspiration 2/2 ?oropharyngeal dysphasia vs globus sensation - evaluated by SLP and GI evaluation recommended. Esophogram done 4/14/22 shows esophageal dysmotility characterized by dimished secondary waves and presence of tertiary contractions. Cr 1.4 --> 2.0; likely pre-renal in the setting of decreased intake. Will administer IVFs PRN and continue to monitor. Symptoms improved w/ diltiazem and patient now tolerating PO intake w/ no issues. Cr improved. Will discharge home w/ HH PT/OT and follow up BNP given new prescription of  torsemide 10mg QD. Close follow up w/ cardiology, interventional cardiology and GI.     History of esophageal dysmotility.    Chronic respiratory failure, on oxygen 2.5 liters/minute.    Had a gout attack in her foot and was started on allopurinol 100 mg half a tablet every other day    Followed at home by [facility] home [facility].      Admit Date: 4/8/22   Discharge Date: 4/22/22  Discharge Facility: Hospital    Medication Reconciliation:  Medications changed/added/deleted.  Allopurinol added, torsemide 10 mg daily  New Prescriptions filled after discharge: yes  Discharge summary reviewed:  yes  Pending test results at discharge reviewed:   no  Follow up appointments scheduled:  yes   with Cardiology   Follow up labs/tests ordered:   yes  Home Health ordered on discharge:   yes  Home Health company name: [facility]  DME ordered at discharge:   no  How patient is feeling since discharge from the hospital?  Slight improvement in respiratory status.     Patient follow up phone call documented on separate encounter.    No results displayed because visit has over 200 results.      Admission on 04/07/2022, Discharged on 04/08/2022   Component Date Value Ref Range Status    WBC 04/07/2022 8.33  3.90 - 12.70 K/uL Final    RBC 04/07/2022 3.37 (A) 4.00 - 5.40 M/uL Final    Hemoglobin 04/07/2022 9.7 (A) 12.0 - 16.0 g/dL Final    Hematocrit 04/07/2022 31.4 (A) 37.0 - 48.5 % Final    MCV 04/07/2022 93  82 - 98 fL Final    MCH 04/07/2022 28.8  27.0 - 31.0 pg Final    MCHC 04/07/2022 30.9 (A) 32.0 - 36.0 g/dL Final    RDW 04/07/2022 14.0  11.5 - 14.5 % Final    Platelets 04/07/2022 160  150 - 450 K/uL Final    MPV 04/07/2022 11.3  9.2 - 12.9 fL Final    Immature Granulocytes 04/07/2022 0.4  0.0 - 0.5 % Final    Gran # (ANC) 04/07/2022 6.9  1.8 - 7.7 K/uL Final    Immature Grans (Abs) 04/07/2022 0.03  0.00 - 0.04 K/uL Final    Lymph # 04/07/2022 0.8 (A) 1.0 - 4.8 K/uL Final    Mono # 04/07/2022 0.5  0.3 -  1.0 K/uL Final    Eos # 04/07/2022 0.1  0.0 - 0.5 K/uL Final    Baso # 04/07/2022 0.03  0.00 - 0.20 K/uL Final    nRBC 04/07/2022 0  0 /100 WBC Final    Gran % 04/07/2022 82.8 (A) 38.0 - 73.0 % Final    Lymph % 04/07/2022 9.4 (A) 18.0 - 48.0 % Final    Mono % 04/07/2022 5.8  4.0 - 15.0 % Final    Eosinophil % 04/07/2022 1.2  0.0 - 8.0 % Final    Basophil % 04/07/2022 0.4  0.0 - 1.9 % Final    Differential Method 04/07/2022 Automated   Final    Sodium 04/07/2022 140  136 - 145 mmol/L Final    Potassium 04/07/2022 4.9  3.5 - 5.1 mmol/L Final    Chloride 04/07/2022 106  95 - 110 mmol/L Final    CO2 04/07/2022 27  23 - 29 mmol/L Final    Glucose 04/07/2022 121 (A) 70 - 110 mg/dL Final    BUN 04/07/2022 33 (A) 8 - 23 mg/dL Final    Creatinine 04/07/2022 1.3  0.5 - 1.4 mg/dL Final    Calcium 04/07/2022 8.9  8.7 - 10.5 mg/dL Final    Total Protein 04/07/2022 6.3  6.0 - 8.4 g/dL Final    Albumin 04/07/2022 3.3 (A) 3.5 - 5.2 g/dL Final    Total Bilirubin 04/07/2022 0.7  0.1 - 1.0 mg/dL Final    Alkaline Phosphatase 04/07/2022 73  55 - 135 U/L Final    AST 04/07/2022 17  10 - 40 U/L Final    ALT 04/07/2022 16  10 - 44 U/L Final    Anion Gap 04/07/2022 7 (A) 8 - 16 mmol/L Final    eGFR if  04/07/2022 46.7 (A) >60 mL/min/1.73 m^2 Final    eGFR if non African American 04/07/2022 40.5 (A) >60 mL/min/1.73 m^2 Final    BNP 04/07/2022 219 (A) 0 - 99 pg/mL Final    Troponin I 04/07/2022 0.058 (A) <=0.040 ng/mL Final    PT 04/07/2022 13.3  11.4 - 13.7 sec Final    INR 04/07/2022 1.1   Final    SARS-CoV-2 RNA, Amplification, Qual 04/07/2022 Negative  Negative Final    Troponin I 04/07/2022 0.066 (A) <=0.040 ng/mL Final    WBC 04/08/2022 8.20  3.90 - 12.70 K/uL Final    RBC 04/08/2022 3.43 (A) 4.00 - 5.40 M/uL Final    Hemoglobin 04/08/2022 9.8 (A) 12.0 - 16.0 g/dL Final    Hematocrit 04/08/2022 32.9 (A) 37.0 - 48.5 % Final    MCV 04/08/2022 96  82 - 98 fL Final    MCH 04/08/2022  28.6  27.0 - 31.0 pg Final    MCHC 04/08/2022 29.8 (A) 32.0 - 36.0 g/dL Final    RDW 04/08/2022 13.6  11.5 - 14.5 % Final    Platelets 04/08/2022 148 (A) 150 - 450 K/uL Final    MPV 04/08/2022 11.6  9.2 - 12.9 fL Final    Immature Granulocytes 04/08/2022 0.4  0.0 - 0.5 % Final    Gran # (ANC) 04/08/2022 6.4  1.8 - 7.7 K/uL Final    Immature Grans (Abs) 04/08/2022 0.03  0.00 - 0.04 K/uL Final    Lymph # 04/08/2022 1.1  1.0 - 4.8 K/uL Final    Mono # 04/08/2022 0.5  0.3 - 1.0 K/uL Final    Eos # 04/08/2022 0.2  0.0 - 0.5 K/uL Final    Baso # 04/08/2022 0.03  0.00 - 0.20 K/uL Final    nRBC 04/08/2022 0  0 /100 WBC Final    Gran % 04/08/2022 77.3 (A) 38.0 - 73.0 % Final    Lymph % 04/08/2022 13.3 (A) 18.0 - 48.0 % Final    Mono % 04/08/2022 6.6  4.0 - 15.0 % Final    Eosinophil % 04/08/2022 2.0  0.0 - 8.0 % Final    Basophil % 04/08/2022 0.4  0.0 - 1.9 % Final    Differential Method 04/08/2022 Automated   Final    Sodium 04/08/2022 144  136 - 145 mmol/L Final    Potassium 04/08/2022 4.5  3.5 - 5.1 mmol/L Final    Chloride 04/08/2022 109  95 - 110 mmol/L Final    CO2 04/08/2022 24  23 - 29 mmol/L Final    Glucose 04/08/2022 88  70 - 110 mg/dL Final    BUN 04/08/2022 28 (A) 8 - 23 mg/dL Final    Creatinine 04/08/2022 1.3  0.5 - 1.4 mg/dL Final    Calcium 04/08/2022 9.2  8.7 - 10.5 mg/dL Final    Anion Gap 04/08/2022 11  8 - 16 mmol/L Final    eGFR if  04/08/2022 46.7 (A) >60 mL/min/1.73 m^2 Final    eGFR if non African American 04/08/2022 40.5 (A) >60 mL/min/1.73 m^2 Final   Admission on 03/18/2022, Discharged on 04/01/2022   Component Date Value Ref Range Status    POCT Glucose 03/18/2022 218 (A) 70 - 110 mg/dL Final    POCT Glucose 03/19/2022 112 (A) 70 - 110 mg/dL Final    POCT Glucose 03/19/2022 125 (A) 70 - 110 mg/dL Final    POCT Glucose 03/19/2022 112 (A) 70 - 110 mg/dL Final    POCT Glucose 03/19/2022 122 (A) 70 - 110 mg/dL Final    POCT Glucose 03/20/2022 108  70  - 110 mg/dL Final    POCT Glucose 03/20/2022 102  70 - 110 mg/dL Final    POCT Glucose 03/20/2022 103  70 - 110 mg/dL Final    Sodium 03/21/2022 144  136 - 145 mmol/L Final    Potassium 03/21/2022 3.8  3.5 - 5.1 mmol/L Final    Chloride 03/21/2022 107  95 - 110 mmol/L Final    CO2 03/21/2022 27  23 - 29 mmol/L Final    Glucose 03/21/2022 84  70 - 110 mg/dL Final    BUN 03/21/2022 72 (A) 8 - 23 mg/dL Final    Creatinine 03/21/2022 1.9 (A) 0.5 - 1.4 mg/dL Final    Calcium 03/21/2022 8.9  8.7 - 10.5 mg/dL Final    Anion Gap 03/21/2022 10  8 - 16 mmol/L Final    eGFR if  03/21/2022 29.5 (A) >60 mL/min/1.73 m^2 Final    eGFR if non African American 03/21/2022 25.6 (A) >60 mL/min/1.73 m^2 Final    Magnesium 03/21/2022 1.5 (A) 1.6 - 2.6 mg/dL Final    Phosphorus 03/21/2022 4.0  2.7 - 4.5 mg/dL Final    WBC 03/21/2022 6.08  3.90 - 12.70 K/uL Final    RBC 03/21/2022 3.20 (A) 4.00 - 5.40 M/uL Final    Hemoglobin 03/21/2022 9.2 (A) 12.0 - 16.0 g/dL Final    Hematocrit 03/21/2022 30.1 (A) 37.0 - 48.5 % Final    MCV 03/21/2022 94  82 - 98 fL Final    MCH 03/21/2022 28.8  27.0 - 31.0 pg Final    MCHC 03/21/2022 30.6 (A) 32.0 - 36.0 g/dL Final    RDW 03/21/2022 14.3  11.5 - 14.5 % Final    Platelets 03/21/2022 155  150 - 450 K/uL Final    MPV 03/21/2022 12.4  9.2 - 12.9 fL Final    Immature Granulocytes 03/21/2022 0.5  0.0 - 0.5 % Final    Gran # (ANC) 03/21/2022 4.1  1.8 - 7.7 K/uL Final    Immature Grans (Abs) 03/21/2022 0.03  0.00 - 0.04 K/uL Final    Lymph # 03/21/2022 1.3  1.0 - 4.8 K/uL Final    Mono # 03/21/2022 0.5  0.3 - 1.0 K/uL Final    Eos # 03/21/2022 0.2  0.0 - 0.5 K/uL Final    Baso # 03/21/2022 0.04  0.00 - 0.20 K/uL Final    nRBC 03/21/2022 0  0 /100 WBC Final    Gran % 03/21/2022 66.7  38.0 - 73.0 % Final    Lymph % 03/21/2022 21.4  18.0 - 48.0 % Final    Mono % 03/21/2022 8.1  4.0 - 15.0 % Final    Eosinophil % 03/21/2022 2.6  0.0 - 8.0 % Final    Basophil %  03/21/2022 0.7  0.0 - 1.9 % Final    Differential Method 03/21/2022 Automated   Final    Total Protein 03/21/2022 5.7 (A) 6.0 - 8.4 g/dL Final    Albumin 03/21/2022 2.9 (A) 3.5 - 5.2 g/dL Final    Total Bilirubin 03/21/2022 0.6  0.1 - 1.0 mg/dL Final    Bilirubin, Direct 03/21/2022 0.3  0.1 - 0.3 mg/dL Final    AST 03/21/2022 18  10 - 40 U/L Final    ALT 03/21/2022 22  10 - 44 U/L Final    Alkaline Phosphatase 03/21/2022 82  55 - 135 U/L Final    Sodium 03/24/2022 139  136 - 145 mmol/L Final    Potassium 03/24/2022 3.8  3.5 - 5.1 mmol/L Final    Chloride 03/24/2022 105  95 - 110 mmol/L Final    CO2 03/24/2022 25  23 - 29 mmol/L Final    Glucose 03/24/2022 93  70 - 110 mg/dL Final    BUN 03/24/2022 68 (A) 8 - 23 mg/dL Final    Creatinine 03/24/2022 1.8 (A) 0.5 - 1.4 mg/dL Final    Calcium 03/24/2022 8.8  8.7 - 10.5 mg/dL Final    Anion Gap 03/24/2022 9  8 - 16 mmol/L Final    eGFR if  03/24/2022 31.5 (A) >60 mL/min/1.73 m^2 Final    eGFR if non  03/24/2022 27.3 (A) >60 mL/min/1.73 m^2 Final    Magnesium 03/24/2022 1.4 (A) 1.6 - 2.6 mg/dL Final    Phosphorus 03/24/2022 3.4  2.7 - 4.5 mg/dL Final    WBC 03/24/2022 6.59  3.90 - 12.70 K/uL Final    RBC 03/24/2022 3.05 (A) 4.00 - 5.40 M/uL Final    Hemoglobin 03/24/2022 9.0 (A) 12.0 - 16.0 g/dL Final    Hematocrit 03/24/2022 27.7 (A) 37.0 - 48.5 % Final    MCV 03/24/2022 91  82 - 98 fL Final    MCH 03/24/2022 29.5  27.0 - 31.0 pg Final    MCHC 03/24/2022 32.5  32.0 - 36.0 g/dL Final    RDW 03/24/2022 14.5  11.5 - 14.5 % Final    Platelets 03/24/2022 133 (A) 150 - 450 K/uL Final    MPV 03/24/2022 12.7  9.2 - 12.9 fL Final    Immature Granulocytes 03/24/2022 0.5  0.0 - 0.5 % Final    Gran # (ANC) 03/24/2022 4.4  1.8 - 7.7 K/uL Final    Immature Grans (Abs) 03/24/2022 0.03  0.00 - 0.04 K/uL Final    Lymph # 03/24/2022 1.4  1.0 - 4.8 K/uL Final    Mono # 03/24/2022 0.6  0.3 - 1.0 K/uL Final    Eos #  03/24/2022 0.1  0.0 - 0.5 K/uL Final    Baso # 03/24/2022 0.03  0.00 - 0.20 K/uL Final    nRBC 03/24/2022 0  0 /100 WBC Final    Gran % 03/24/2022 66.9  38.0 - 73.0 % Final    Lymph % 03/24/2022 21.7  18.0 - 48.0 % Final    Mono % 03/24/2022 8.3  4.0 - 15.0 % Final    Eosinophil % 03/24/2022 2.1  0.0 - 8.0 % Final    Basophil % 03/24/2022 0.5  0.0 - 1.9 % Final    Differential Method 03/24/2022 Automated   Final    Total Protein 03/24/2022 5.5 (A) 6.0 - 8.4 g/dL Final    Albumin 03/24/2022 2.9 (A) 3.5 - 5.2 g/dL Final    Total Bilirubin 03/24/2022 0.5  0.1 - 1.0 mg/dL Final    Bilirubin, Direct 03/24/2022 0.2  0.1 - 0.3 mg/dL Final    AST 03/24/2022 22  10 - 40 U/L Final    ALT 03/24/2022 29  10 - 44 U/L Final    Alkaline Phosphatase 03/24/2022 96  55 - 135 U/L Final    Sodium 03/28/2022 144  136 - 145 mmol/L Final    Potassium 03/28/2022 3.7  3.5 - 5.1 mmol/L Final    Chloride 03/28/2022 107  95 - 110 mmol/L Final    CO2 03/28/2022 27  23 - 29 mmol/L Final    Glucose 03/28/2022 88  70 - 110 mg/dL Final    BUN 03/28/2022 77 (A) 8 - 23 mg/dL Final    Creatinine 03/28/2022 1.6 (A) 0.5 - 1.4 mg/dL Final    Calcium 03/28/2022 9.1  8.7 - 10.5 mg/dL Final    Anion Gap 03/28/2022 10  8 - 16 mmol/L Final    eGFR if  03/28/2022 36.3 (A) >60 mL/min/1.73 m^2 Final    eGFR if non African American 03/28/2022 31.5 (A) >60 mL/min/1.73 m^2 Final    Magnesium 03/28/2022 1.5 (A) 1.6 - 2.6 mg/dL Final    Phosphorus 03/28/2022 3.4  2.7 - 4.5 mg/dL Final    WBC 03/28/2022 7.26  3.90 - 12.70 K/uL Final    RBC 03/28/2022 3.26 (A) 4.00 - 5.40 M/uL Final    Hemoglobin 03/28/2022 9.5 (A) 12.0 - 16.0 g/dL Final    Hematocrit 03/28/2022 30.9 (A) 37.0 - 48.5 % Final    MCV 03/28/2022 95  82 - 98 fL Final    MCH 03/28/2022 29.1  27.0 - 31.0 pg Final    MCHC 03/28/2022 30.7 (A) 32.0 - 36.0 g/dL Final    RDW 03/28/2022 14.1  11.5 - 14.5 % Final    Platelets 03/28/2022 145 (A) 150 - 450 K/uL  Final    MPV 03/28/2022 13.2 (A) 9.2 - 12.9 fL Final    Immature Granulocytes 03/28/2022 0.4  0.0 - 0.5 % Final    Gran # (ANC) 03/28/2022 5.4  1.8 - 7.7 K/uL Final    Immature Grans (Abs) 03/28/2022 0.03  0.00 - 0.04 K/uL Final    Lymph # 03/28/2022 1.1  1.0 - 4.8 K/uL Final    Mono # 03/28/2022 0.6  0.3 - 1.0 K/uL Final    Eos # 03/28/2022 0.1  0.0 - 0.5 K/uL Final    Baso # 03/28/2022 0.04  0.00 - 0.20 K/uL Final    nRBC 03/28/2022 0  0 /100 WBC Final    Gran % 03/28/2022 73.7 (A) 38.0 - 73.0 % Final    Lymph % 03/28/2022 15.7 (A) 18.0 - 48.0 % Final    Mono % 03/28/2022 7.7  4.0 - 15.0 % Final    Eosinophil % 03/28/2022 1.9  0.0 - 8.0 % Final    Basophil % 03/28/2022 0.6  0.0 - 1.9 % Final    Differential Method 03/28/2022 Automated   Final    Total Protein 03/28/2022 5.7 (A) 6.0 - 8.4 g/dL Final    Albumin 03/28/2022 3.0 (A) 3.5 - 5.2 g/dL Final    Total Bilirubin 03/28/2022 0.5  0.1 - 1.0 mg/dL Final    Bilirubin, Direct 03/28/2022 0.3  0.1 - 0.3 mg/dL Final    AST 03/28/2022 24  10 - 40 U/L Final    ALT 03/28/2022 32  10 - 44 U/L Final    Alkaline Phosphatase 03/28/2022 96  55 - 135 U/L Final    POCT Glucose 03/28/2022 116 (A) 70 - 110 mg/dL Final    POCT Glucose 03/29/2022 123 (A) 70 - 110 mg/dL Final    Sodium 03/31/2022 139  136 - 145 mmol/L Final    Potassium 03/31/2022 3.8  3.5 - 5.1 mmol/L Final    Chloride 03/31/2022 101  95 - 110 mmol/L Final    CO2 03/31/2022 29  23 - 29 mmol/L Final    Glucose 03/31/2022 91  70 - 110 mg/dL Final    BUN 03/31/2022 68 (A) 8 - 23 mg/dL Final    Creatinine 03/31/2022 1.5 (A) 0.5 - 1.4 mg/dL Final    Calcium 03/31/2022 9.0  8.7 - 10.5 mg/dL Final    Anion Gap 03/31/2022 9  8 - 16 mmol/L Final    eGFR if  03/31/2022 39.3 (A) >60 mL/min/1.73 m^2 Final    eGFR if non  03/31/2022 34.1 (A) >60 mL/min/1.73 m^2 Final    Magnesium 03/31/2022 1.8  1.6 - 2.6 mg/dL Final    Phosphorus 03/31/2022 3.5  2.7 - 4.5  mg/dL Final    WBC 03/31/2022 6.43  3.90 - 12.70 K/uL Final    RBC 03/31/2022 3.18 (A) 4.00 - 5.40 M/uL Final    Hemoglobin 03/31/2022 9.3 (A) 12.0 - 16.0 g/dL Final    Hematocrit 03/31/2022 29.8 (A) 37.0 - 48.5 % Final    MCV 03/31/2022 94  82 - 98 fL Final    MCH 03/31/2022 29.2  27.0 - 31.0 pg Final    MCHC 03/31/2022 31.2 (A) 32.0 - 36.0 g/dL Final    RDW 03/31/2022 13.7  11.5 - 14.5 % Final    Platelets 03/31/2022 132 (A) 150 - 450 K/uL Final    MPV 03/31/2022 13.0 (A) 9.2 - 12.9 fL Final    Immature Granulocytes 03/31/2022 0.5  0.0 - 0.5 % Final    Gran # (ANC) 03/31/2022 4.4  1.8 - 7.7 K/uL Final    Immature Grans (Abs) 03/31/2022 0.03  0.00 - 0.04 K/uL Final    Lymph # 03/31/2022 1.3  1.0 - 4.8 K/uL Final    Mono # 03/31/2022 0.5  0.3 - 1.0 K/uL Final    Eos # 03/31/2022 0.1  0.0 - 0.5 K/uL Final    Baso # 03/31/2022 0.04  0.00 - 0.20 K/uL Final    nRBC 03/31/2022 0  0 /100 WBC Final    Gran % 03/31/2022 67.9  38.0 - 73.0 % Final    Lymph % 03/31/2022 20.7  18.0 - 48.0 % Final    Mono % 03/31/2022 8.4  4.0 - 15.0 % Final    Eosinophil % 03/31/2022 1.9  0.0 - 8.0 % Final    Basophil % 03/31/2022 0.6  0.0 - 1.9 % Final    Differential Method 03/31/2022 Automated   Final    Total Protein 03/31/2022 5.7 (A) 6.0 - 8.4 g/dL Final    Albumin 03/31/2022 3.0 (A) 3.5 - 5.2 g/dL Final    Total Bilirubin 03/31/2022 0.4  0.1 - 1.0 mg/dL Final    Bilirubin, Direct 03/31/2022 0.2  0.1 - 0.3 mg/dL Final    AST 03/31/2022 18  10 - 40 U/L Final    ALT 03/31/2022 26  10 - 44 U/L Final    Alkaline Phosphatase 03/31/2022 94  55 - 135 U/L Final   Lab Visit on 03/21/2022   Component Date Value Ref Range Status    SARS-CoV2 (COVID-19) Qualitative P* 03/21/2022 Not Detected  Not Detected Final    SARS-COV-2- Cycle Number 03/21/2022 N/A   Final   Admission on 03/15/2022, Discharged on 03/18/2022   Component Date Value Ref Range Status    WBC 03/16/2022 6.39  3.90 - 12.70 K/uL Final    RBC 03/16/2022  2.86 (A) 4.00 - 5.40 M/uL Final    Hemoglobin 03/16/2022 8.3 (A) 12.0 - 16.0 g/dL Final    Hematocrit 03/16/2022 27.4 (A) 37.0 - 48.5 % Final    MCV 03/16/2022 96  82 - 98 fL Final    MCH 03/16/2022 29.0  27.0 - 31.0 pg Final    MCHC 03/16/2022 30.3 (A) 32.0 - 36.0 g/dL Final    RDW 03/16/2022 14.8 (A) 11.5 - 14.5 % Final    Platelets 03/16/2022 123 (A) 150 - 450 K/uL Final    MPV 03/16/2022 12.1  9.2 - 12.9 fL Final    Immature Granulocytes 03/16/2022 0.3  0.0 - 0.5 % Final    Gran # (ANC) 03/16/2022 4.5  1.8 - 7.7 K/uL Final    Immature Grans (Abs) 03/16/2022 0.02  0.00 - 0.04 K/uL Final    Lymph # 03/16/2022 1.1  1.0 - 4.8 K/uL Final    Mono # 03/16/2022 0.6  0.3 - 1.0 K/uL Final    Eos # 03/16/2022 0.2  0.0 - 0.5 K/uL Final    Baso # 03/16/2022 0.05  0.00 - 0.20 K/uL Final    nRBC 03/16/2022 0  0 /100 WBC Final    Gran % 03/16/2022 71.0  38.0 - 73.0 % Final    Lymph % 03/16/2022 16.4 (A) 18.0 - 48.0 % Final    Mono % 03/16/2022 8.8  4.0 - 15.0 % Final    Eosinophil % 03/16/2022 2.7  0.0 - 8.0 % Final    Basophil % 03/16/2022 0.8  0.0 - 1.9 % Final    Differential Method 03/16/2022 Automated   Final    Sodium 03/16/2022 140  136 - 145 mmol/L Final    Potassium 03/16/2022 5.3 (A) 3.5 - 5.1 mmol/L Final    Chloride 03/16/2022 110  95 - 110 mmol/L Final    CO2 03/16/2022 20 (A) 23 - 29 mmol/L Final    Glucose 03/16/2022 106  70 - 110 mg/dL Final    BUN 03/16/2022 74 (A) 8 - 23 mg/dL Final    Creatinine 03/16/2022 1.8 (A) 0.5 - 1.4 mg/dL Final    Calcium 03/16/2022 8.8  8.7 - 10.5 mg/dL Final    Total Protein 03/16/2022 5.6 (A) 6.0 - 8.4 g/dL Final    Albumin 03/16/2022 2.9 (A) 3.5 - 5.2 g/dL Final    Total Bilirubin 03/16/2022 0.4  0.1 - 1.0 mg/dL Final    Alkaline Phosphatase 03/16/2022 95  55 - 135 U/L Final    AST 03/16/2022 14  10 - 40 U/L Final    ALT 03/16/2022 13  10 - 44 U/L Final    Anion Gap 03/16/2022 10  8 - 16 mmol/L Final    eGFR if  03/16/2022 31.5  (A) >60 mL/min/1.73 m^2 Final    eGFR if non  03/16/2022 27.3 (A) >60 mL/min/1.73 m^2 Final    Troponin I 03/16/2022 0.014  0.000 - 0.026 ng/mL Final    BNP 03/16/2022 209 (A) 0 - 99 pg/mL Final    POC Rapid COVID 03/16/2022 Negative  Negative Final     Acceptable 03/16/2022 Yes   Final    Sodium 03/16/2022 143  136 - 145 mmol/L Final    Potassium 03/16/2022 5.2 (A) 3.5 - 5.1 mmol/L Final    Chloride 03/16/2022 112 (A) 95 - 110 mmol/L Final    CO2 03/16/2022 22 (A) 23 - 29 mmol/L Final    Glucose 03/16/2022 96  70 - 110 mg/dL Final    BUN 03/16/2022 70 (A) 8 - 23 mg/dL Final    Creatinine 03/16/2022 1.6 (A) 0.5 - 1.4 mg/dL Final    Calcium 03/16/2022 8.8  8.7 - 10.5 mg/dL Final    Anion Gap 03/16/2022 9  8 - 16 mmol/L Final    eGFR if  03/16/2022 36.3 (A) >60 mL/min/1.73 m^2 Final    eGFR if non African American 03/16/2022 31.5 (A) >60 mL/min/1.73 m^2 Final    Magnesium 03/16/2022 1.8  1.6 - 2.6 mg/dL Final    Phosphorus 03/16/2022 3.8  2.7 - 4.5 mg/dL Final    WBC 03/16/2022 6.47  3.90 - 12.70 K/uL Final    RBC 03/16/2022 3.05 (A) 4.00 - 5.40 M/uL Final    Hemoglobin 03/16/2022 8.8 (A) 12.0 - 16.0 g/dL Final    Hematocrit 03/16/2022 29.4 (A) 37.0 - 48.5 % Final    MCV 03/16/2022 96  82 - 98 fL Final    MCH 03/16/2022 28.9  27.0 - 31.0 pg Final    MCHC 03/16/2022 29.9 (A) 32.0 - 36.0 g/dL Final    RDW 03/16/2022 14.8 (A) 11.5 - 14.5 % Final    Platelets 03/16/2022 135 (A) 150 - 450 K/uL Final    MPV 03/16/2022 12.7  9.2 - 12.9 fL Final    Immature Granulocytes 03/16/2022 0.5  0.0 - 0.5 % Final    Gran # (ANC) 03/16/2022 4.8  1.8 - 7.7 K/uL Final    Immature Grans (Abs) 03/16/2022 0.03  0.00 - 0.04 K/uL Final    Lymph # 03/16/2022 0.9 (A) 1.0 - 4.8 K/uL Final    Mono # 03/16/2022 0.5  0.3 - 1.0 K/uL Final    Eos # 03/16/2022 0.1  0.0 - 0.5 K/uL Final    Baso # 03/16/2022 0.02  0.00 - 0.20 K/uL Final    nRBC 03/16/2022 0  0 /100 WBC  Final    Gran % 03/16/2022 74.5 (A) 38.0 - 73.0 % Final    Lymph % 03/16/2022 14.2 (A) 18.0 - 48.0 % Final    Mono % 03/16/2022 8.3  4.0 - 15.0 % Final    Eosinophil % 03/16/2022 2.2  0.0 - 8.0 % Final    Basophil % 03/16/2022 0.3  0.0 - 1.9 % Final    Differential Method 03/16/2022 Automated   Final    Troponin I 03/16/2022 0.015  0.000 - 0.026 ng/mL Final    Sodium 03/16/2022 144  136 - 145 mmol/L Final    Potassium 03/16/2022 4.9  3.5 - 5.1 mmol/L Final    Chloride 03/16/2022 110  95 - 110 mmol/L Final    CO2 03/16/2022 24  23 - 29 mmol/L Final    Glucose 03/16/2022 95  70 - 110 mg/dL Final    BUN 03/16/2022 62 (A) 8 - 23 mg/dL Final    Creatinine 03/16/2022 1.5 (A) 0.5 - 1.4 mg/dL Final    Calcium 03/16/2022 9.1  8.7 - 10.5 mg/dL Final    Anion Gap 03/16/2022 10  8 - 16 mmol/L Final    eGFR if  03/16/2022 39.3 (A) >60 mL/min/1.73 m^2 Final    eGFR if non  03/16/2022 34.1 (A) >60 mL/min/1.73 m^2 Final    POCT Glucose 03/16/2022 93  70 - 110 mg/dL Final    POCT Glucose 03/16/2022 168 (A) 70 - 110 mg/dL Final    Sodium 03/17/2022 144  136 - 145 mmol/L Final    Potassium 03/17/2022 4.1  3.5 - 5.1 mmol/L Final    Chloride 03/17/2022 110  95 - 110 mmol/L Final    CO2 03/17/2022 25  23 - 29 mmol/L Final    Glucose 03/17/2022 86  70 - 110 mg/dL Final    BUN 03/17/2022 65 (A) 8 - 23 mg/dL Final    Creatinine 03/17/2022 1.5 (A) 0.5 - 1.4 mg/dL Final    Calcium 03/17/2022 8.9  8.7 - 10.5 mg/dL Final    Anion Gap 03/17/2022 9  8 - 16 mmol/L Final    eGFR if  03/17/2022 39.3 (A) >60 mL/min/1.73 m^2 Final    eGFR if non  03/17/2022 34.1 (A) >60 mL/min/1.73 m^2 Final    Magnesium 03/17/2022 1.8  1.6 - 2.6 mg/dL Final    Phosphorus 03/17/2022 4.1  2.7 - 4.5 mg/dL Final    WBC 03/17/2022 5.09  3.90 - 12.70 K/uL Final    RBC 03/17/2022 2.90 (A) 4.00 - 5.40 M/uL Final    Hemoglobin 03/17/2022 8.3 (A) 12.0 - 16.0 g/dL Final     Hematocrit 03/17/2022 27.0 (A) 37.0 - 48.5 % Final    MCV 03/17/2022 93  82 - 98 fL Final    MCH 03/17/2022 28.6  27.0 - 31.0 pg Final    MCHC 03/17/2022 30.7 (A) 32.0 - 36.0 g/dL Final    RDW 03/17/2022 14.9 (A) 11.5 - 14.5 % Final    Platelets 03/17/2022 133 (A) 150 - 450 K/uL Final    MPV 03/17/2022 12.8  9.2 - 12.9 fL Final    Immature Granulocytes 03/17/2022 0.4  0.0 - 0.5 % Final    Gran # (ANC) 03/17/2022 3.3  1.8 - 7.7 K/uL Final    Immature Grans (Abs) 03/17/2022 0.02  0.00 - 0.04 K/uL Final    Lymph # 03/17/2022 1.1  1.0 - 4.8 K/uL Final    Mono # 03/17/2022 0.5  0.3 - 1.0 K/uL Final    Eos # 03/17/2022 0.1  0.0 - 0.5 K/uL Final    Baso # 03/17/2022 0.03  0.00 - 0.20 K/uL Final    nRBC 03/17/2022 0  0 /100 WBC Final    Gran % 03/17/2022 65.6  38.0 - 73.0 % Final    Lymph % 03/17/2022 21.6  18.0 - 48.0 % Final    Mono % 03/17/2022 9.0  4.0 - 15.0 % Final    Eosinophil % 03/17/2022 2.8  0.0 - 8.0 % Final    Basophil % 03/17/2022 0.6  0.0 - 1.9 % Final    Differential Method 03/17/2022 Automated   Final    POCT Glucose 03/17/2022 85  70 - 110 mg/dL Final    POCT Glucose 03/17/2022 99  70 - 110 mg/dL Final    Retic 03/17/2022 3.7 (A) 0.5 - 2.5 % Final    Ferritin 03/17/2022 187  20.0 - 300.0 ng/mL Final    POCT Glucose 03/17/2022 130 (A) 70 - 110 mg/dL Final    POCT Glucose 03/17/2022 99  70 - 110 mg/dL Final    Sodium 03/18/2022 142  136 - 145 mmol/L Final    Potassium 03/18/2022 3.9  3.5 - 5.1 mmol/L Final    Chloride 03/18/2022 108  95 - 110 mmol/L Final    CO2 03/18/2022 25  23 - 29 mmol/L Final    Glucose 03/18/2022 82  70 - 110 mg/dL Final    BUN 03/18/2022 62 (A) 8 - 23 mg/dL Final    Creatinine 03/18/2022 1.6 (A) 0.5 - 1.4 mg/dL Final    Calcium 03/18/2022 8.9  8.7 - 10.5 mg/dL Final    Anion Gap 03/18/2022 9  8 - 16 mmol/L Final    eGFR if  03/18/2022 36.3 (A) >60 mL/min/1.73 m^2 Final    eGFR if non African American 03/18/2022 31.5 (A) >60  mL/min/1.73 m^2 Final    Magnesium 03/18/2022 1.7  1.6 - 2.6 mg/dL Final    Phosphorus 03/18/2022 3.2  2.7 - 4.5 mg/dL Final    WBC 03/18/2022 5.52  3.90 - 12.70 K/uL Final    RBC 03/18/2022 3.15 (A) 4.00 - 5.40 M/uL Final    Hemoglobin 03/18/2022 9.1 (A) 12.0 - 16.0 g/dL Final    Hematocrit 03/18/2022 30.0 (A) 37.0 - 48.5 % Final    MCV 03/18/2022 95  82 - 98 fL Final    MCH 03/18/2022 28.9  27.0 - 31.0 pg Final    MCHC 03/18/2022 30.3 (A) 32.0 - 36.0 g/dL Final    RDW 03/18/2022 14.8 (A) 11.5 - 14.5 % Final    Platelets 03/18/2022 138 (A) 150 - 450 K/uL Final    MPV 03/18/2022 12.5  9.2 - 12.9 fL Final    Immature Granulocytes 03/18/2022 0.4  0.0 - 0.5 % Final    Gran # (ANC) 03/18/2022 3.8  1.8 - 7.7 K/uL Final    Immature Grans (Abs) 03/18/2022 0.02  0.00 - 0.04 K/uL Final    Lymph # 03/18/2022 1.1  1.0 - 4.8 K/uL Final    Mono # 03/18/2022 0.5  0.3 - 1.0 K/uL Final    Eos # 03/18/2022 0.1  0.0 - 0.5 K/uL Final    Baso # 03/18/2022 0.02  0.00 - 0.20 K/uL Final    nRBC 03/18/2022 0  0 /100 WBC Final    Gran % 03/18/2022 68.6  38.0 - 73.0 % Final    Lymph % 03/18/2022 19.2  18.0 - 48.0 % Final    Mono % 03/18/2022 8.9  4.0 - 15.0 % Final    Eosinophil % 03/18/2022 2.5  0.0 - 8.0 % Final    Basophil % 03/18/2022 0.4  0.0 - 1.9 % Final    Differential Method 03/18/2022 Automated   Final    POCT Glucose 03/18/2022 95  70 - 110 mg/dL Final    POCT Glucose 03/18/2022 174 (A) 70 - 110 mg/dL Final    POCT Glucose 03/18/2022 116 (A) 70 - 110 mg/dL Final   No results displayed because visit has over 200 results.      Lab Visit on 03/14/2022   Component Date Value Ref Range Status    SARS-CoV2 (COVID-19) Qualitative P* 03/14/2022 Not Detected  Not Detected Final    SARS-COV-2- Cycle Number 03/14/2022 N/A   Final   Lab Visit on 03/10/2022   Component Date Value Ref Range Status    SARS-CoV2 (COVID-19) Qualitative P* 03/10/2022 Not Detected  Not Detected Final    SARS-COV-2- Cycle Number  03/10/2022 N/A   Final   Lab Visit on 03/07/2022   Component Date Value Ref Range Status    SARS-CoV2 (COVID-19) Qualitative P* 03/07/2022 Not Detected  Not Detected Final    SARS-COV-2- Cycle Number 03/07/2022 N/A   Final   Lab Visit on 03/03/2022   Component Date Value Ref Range Status    SARS-CoV2 (COVID-19) Qualitative P* 03/03/2022 Not Detected  Not Detected Final    SARS-COV-2- Cycle Number 03/03/2022 N/A   Final   There may be more visits with results that are not included.       Past Medical History:   Diagnosis Date    CAD (coronary artery disease)     Cancer     colon    CHF (congestive heart failure)     Colitis     Colon cancer     COPD (chronic obstructive pulmonary disease)     Decreased hearing     Diabetes mellitus     Diabetes mellitus, type 2     Hypercholesterolemia     Hypertension     Hypoxemia     Insomnia     Obesity     Osteoarthritis      Past Surgical History:   Procedure Laterality Date    ANGIOGRAM, CORONARY, WITH LEFT HEART CATHETERIZATION N/A 2/10/2022    Procedure: Angiogram, Coronary, with Left Heart Cath;  Surgeon: Cristian Benjamin MD;  Location: [facility] CATH/EP LAB;  Service: Cardiology;  Laterality: N/A;    CARDIAC CATHETERIZATION      CHOLECYSTECTOMY      COLECTOMY      CORONARY ANGIOPLASTY      CORONARY STENT PLACEMENT      EXTERNAL EAR SURGERY      EYE SURGERY      FLEXIBLE SIGMOIDOSCOPY N/A 9/19/2019    Procedure: SIGMOIDOSCOPY, FLEXIBLE;  Surgeon: Biju Kramer III, MD;  Location: [facility] ENDO;  Service: Endoscopy;  Laterality: N/A;    HYSTERECTOMY      partial     Family History   Problem Relation Age of Onset    Febrile seizures Mother     Heart failure Father        Marital Status:   Alcohol History:  reports current alcohol use.  Tobacco History:  reports that she quit smoking about 16 years ago. Her smoking use included cigarettes. She started smoking about 58 years ago. She has a 150.00 pack-year smoking history. She has never  used smokeless tobacco.  Drug History:  reports no history of drug use.    Review of patient's allergies indicates:   Allergen Reactions    Iodinated contrast media     Strawberries [strawberry] Hives and Itching       Current Outpatient Medications:     albuterol (VENTOLIN HFA) 90 mcg/actuation inhaler, Inhale 2 puffs into the lungs every 6 (six) hours as needed for Wheezing or Shortness of Breath. Rescue, Disp: 36 g, Rfl: 3    allopurinoL (ZYLOPRIM) 100 MG tablet, Take 0.5 tablets (50 mg total) by mouth every other day., Disp: 8 tablet, Rfl: 0    aspirin (ECOTRIN) 81 MG EC tablet, Take 1 tablet (81 mg total) by mouth every morning., Disp: 360 tablet, Rfl: 0    cholestyramine (QUESTRAN) 4 gram packet, Take 1 packet (4 g total) by mouth 2 (two) times daily., Disp: 180 packet, Rfl: 3    coenzyme Q10 100 mg capsule, Take 100 mg by mouth every morning., Disp: , Rfl:     colchicine (COLCRYS) 0.6 mg tablet, Take 1/2 tablet by mouth every other day, Disp: 15 tablet, Rfl: 11    diltiaZEM (CARDIZEM CD) 120 MG Cp24, Take 1 capsule (120 mg total) by mouth once daily., Disp: 30 capsule, Rfl: 11    ergocalciferol (VITAMIN D2) 50,000 unit Cap, Take 1 capsule (50,000 Units total) by mouth every 7 days., Disp: 12 capsule, Rfl: 1    ferrous sulfate 325 (65 FE) MG EC tablet, Take 1 tablet (325 mg total) by mouth once daily., Disp: , Rfl: 0    fish oil-omega-3 fatty acids 300-1,000 mg capsule, Take 1 capsule by mouth every morning., Disp: , Rfl:     fluticasone-salmeterol diskus inhaler 250-50 mcg, Inhale 1 puff into the lungs 2 (two) times daily., Disp: 3 each, Rfl: 1    gabapentin (NEURONTIN) 100 MG capsule, Take 1 capsule (100 mg total) by mouth every evening., Disp: 30 capsule, Rfl: 3    ipratropium-albuteroL (COMBIVENT RESPIMAT)  mcg/actuation inhaler, Inhale 2 puffs into the lungs every 4 (four) hours as needed for Shortness of Breath. Rescue, Disp: 12 g, Rfl: 3    isosorbide mononitrate (IMDUR) 60 MG 24  hr tablet, Take 1 tablet (60 mg total) by mouth once daily., Disp: 30 tablet, Rfl: 11    metoprolol succinate (TOPROL-XL) 50 MG 24 hr tablet, Take 1 tablet (50 mg total) by mouth once daily., Disp: 45 tablet, Rfl: 1    nitroGLYCERIN 0.4 MG/DOSE TL SPRY (NITROLINGUAL) 400 mcg/spray spray, USE ONE SPRAY UNDER THE TONGUE EVERY 5 MINUTES AS NEEDED FOR CHEST PAIN.  DO NOT EXCEED A TOTAL OF 3 SPRAYS IN 15 MINUTES, Disp: 12 g, Rfl: 0    pantoprazole (PROTONIX) 40 MG tablet, Take 1 tablet (40 mg total) by mouth once daily., Disp: 30 tablet, Rfl: 11    temazepam (RESTORIL) 15 mg Cap, Take 15 mg by mouth nightly as needed., Disp: , Rfl:     torsemide (DEMADEX) 10 MG Tab, Take 1 tablet (10 mg total) by mouth once daily., Disp: 30 tablet, Rfl: 11    umeclidinium (INCRUSE ELLIPTA) 62.5 mcg/actuation inhalation capsule, Inhale 62.5 mcg into the lungs every morning. Controller, Disp: 30 each, Rfl: 11    vit C/E/Zn/coppr/lutein/zeaxan (PRESERVISION AREDS-2 ORAL), Take 1 tablet by mouth once daily., Disp: , Rfl:     acetaminophen (TYLENOL) 325 MG tablet, Take 2 tablets (650 mg total) by mouth every 4 (four) hours as needed., Disp: , Rfl: 0    atorvastatin (LIPITOR) 80 MG tablet, Take 1 tablet (80 mg total) by mouth once daily., Disp: 90 tablet, Rfl: 1    NARCAN 4 mg/actuation Spry, , Disp: , Rfl:     ondansetron (ZOFRAN-ODT) 4 MG TbDL, Take 2 tablets (8 mg total) by mouth every 6 (six) hours as needed., Disp: 100 tablet, Rfl: 3    ticagrelor (BRILINTA) 90 mg tablet, Take 1 tablet (90 mg total) by mouth 2 (two) times a day., Disp: 180 tablet, Rfl: 1    triamcinolone acetonide 0.1% (KENALOG) 0.1 % cream, Apply topically 2 (two) times daily. For legs., Disp: 453 g, Rfl: 1    Review of Systems   Constitutional: Negative for appetite change, chills, fatigue, fever and unexpected weight change.   HENT: Negative for congestion, ear pain, sinus pain, sore throat and trouble swallowing.    Eyes: Negative for pain, discharge and  visual disturbance.   Respiratory: Positive for cough, shortness of breath and wheezing. Negative for apnea.    Cardiovascular: Positive for leg swelling (Trace edema). Negative for chest pain and palpitations.   Gastrointestinal: Negative for abdominal pain, blood in stool, constipation, diarrhea, nausea and vomiting.   Endocrine: Negative for heat intolerance, polydipsia and polyuria.   Genitourinary: Negative for difficulty urinating, dyspareunia, dysuria, frequency, hematuria and menstrual problem.   Musculoskeletal: Negative for arthralgias, back pain, gait problem, joint swelling and myalgias.   Allergic/Immunologic: Negative for environmental allergies, food allergies and immunocompromised state.   Neurological: Negative for dizziness, tremors, seizures, numbness and headaches.   Psychiatric/Behavioral: Positive for confusion and dysphoric mood. Negative for behavioral problems, hallucinations and suicidal ideas. The patient is nervous/anxious.         Objective:      Vitals:    05/02/22 1217   BP: 136/70   Pulse: 68   Weight: 78 kg (172 lb)   Height: 5' (1.524 m)     Physical Exam  Vitals and nursing note reviewed.   Constitutional:       General: She is not in acute distress.     Appearance: She is well-developed. She is obese. She is ill-appearing. She is not toxic-appearing.      Comments: Elderly female wearing oxygen   HENT:      Head: Normocephalic and atraumatic.      Right Ear: External ear normal.      Left Ear: External ear normal.      Nose: Nose normal.      Mouth/Throat:      Pharynx: Oropharynx is clear.   Eyes:      Pupils: Pupils are equal, round, and reactive to light.   Neck:      Thyroid: No thyromegaly.      Vascular: No carotid bruit.   Cardiovascular:      Rate and Rhythm: Normal rate and regular rhythm.      Heart sounds: Normal heart sounds. No murmur heard.  Pulmonary:      Effort: Pulmonary effort is normal.      Breath sounds: Rales (Mild crackles in the bases) present. No  wheezing.   Abdominal:      General: Bowel sounds are normal. There is no distension.      Palpations: Abdomen is soft.      Tenderness: There is no abdominal tenderness.   Musculoskeletal:         General: No tenderness or deformity. Normal range of motion.      Cervical back: Normal range of motion and neck supple.      Lumbar back: Normal. No spasms.      Comments: Bends 90 degrees at  waist shoulders good range of motion knees crepitant bilaterally.  Trace ankle and pretibial edema.   Lymphadenopathy:      Cervical: No cervical adenopathy.   Skin:     General: Skin is warm and dry.      Findings: No rash.   Neurological:      Mental Status: She is alert and oriented to person, place, and time.      Cranial Nerves: No cranial nerve deficit.      Coordination: Coordination normal.      Gait: Gait abnormal ( wheelchair-bound).   Psychiatric:         Mood and Affect: Mood normal.         Behavior: Behavior normal.         Thought Content: Thought content normal.         Judgment: Judgment normal.         Assessment:       1. Chronic diastolic congestive heart failure    2. Other screening mammogram    3. Menopause    4. Coronary artery disease of native artery of native heart with stable angina pectoris    5. Chronic hypoxemic respiratory failure    6. Chronic respiratory failure with hypoxia    7. Chronic obstructive pulmonary disease, unspecified COPD type    8. Atherosclerosis of native coronary artery of native heart with unstable angina pectoris    9. Pure hypercholesterolemia    10. Stage 3b chronic kidney disease    11. Gastroesophageal reflux disease without esophagitis    12. Hematoma of rectus sheath, initial encounter    13. Osteoporosis, post-menopausal    14. Primary osteoarthritis of both hips    15. Localized edema    16. Hospital discharge follow-up         Plan:       Chronic diastolic congestive heart failure  -     Ambulatory referral/consult to Home Health; Future; Expected date: 05/04/2022  Set  up home health labs to be done in 2 weeks.  Continue physical therapy and OT as well.  Daily weights needed  Other screening mammogram  -     Mammo Digital Screening Bilat; Future; Expected date: 05/02/2022  Mammogram ordered  Menopause  -     DXA Bone Density Spine And Hip; Future; Expected date: 05/02/2022  DEXA scan ordered  Coronary artery disease of native artery of native heart with stable angina pectoris  -     ticagrelor (BRILINTA) 90 mg tablet; Take 1 tablet (90 mg total) by mouth 2 (two) times a day.  Dispense: 180 tablet; Refill: 1  -     atorvastatin (LIPITOR) 80 MG tablet; Take 1 tablet (80 mg total) by mouth once daily.  Dispense: 90 tablet; Refill: 1    Chronic hypoxemic respiratory failure  Continue O2 at 2.5 L a minute  Chronic respiratory failure with hypoxia    Chronic obstructive pulmonary disease, unspecified COPD type    Atherosclerosis of native coronary artery of native heart with unstable angina pectoris    Pure hypercholesterolemia    Stage 3b chronic kidney disease    Gastroesophageal reflux disease without esophagitis    Hematoma of rectus sheath, initial encounter    Osteoporosis, post-menopausal    Primary osteoarthritis of both hips    Localized edema  Continue diuretics  Hospital discharge follow-up  Hospital medication reconciled with home medication    No follow-ups on file.

## 2022-05-03 ENCOUNTER — TELEPHONE (OUTPATIENT)
Dept: CARDIOLOGY | Facility: CLINIC | Age: 77
End: 2022-05-03
Payer: MEDICARE

## 2022-05-03 NOTE — TELEPHONE ENCOUNTER
----- Message from Kd Hathaway sent at 5/3/2022 12:21 PM CDT -----  Contact: daughter  Type: Needs Medical Advice    Who Called:daughter  Best Call Back Number:[phone]    Additional Information: Need to make an appt please call back, got out hospital 1 week ago needs F/U   Please Advise-Thank you

## 2022-05-05 ENCOUNTER — TELEPHONE (OUTPATIENT)
Dept: FAMILY MEDICINE | Facility: CLINIC | Age: 77
End: 2022-05-05

## 2022-05-05 NOTE — TELEPHONE ENCOUNTER
Pt no showed last appointment. Please call to reschedule. Please remind pt that they are at risk for being discharged.

## 2022-05-09 ENCOUNTER — TELEPHONE (OUTPATIENT)
Dept: FAMILY MEDICINE | Facility: CLINIC | Age: 77
End: 2022-05-09

## 2022-05-09 NOTE — TELEPHONE ENCOUNTER
----- Message from Kaur Browne sent at 5/9/2022  2:56 PM CDT -----  Sandra with, [facility] calling they have questions about her medications.  # [phone]

## 2022-05-17 ENCOUNTER — TELEPHONE (OUTPATIENT)
Dept: FAMILY MEDICINE | Facility: CLINIC | Age: 77
End: 2022-05-17

## 2022-05-17 ENCOUNTER — LAB VISIT (OUTPATIENT)
Dept: LAB | Facility: HOSPITAL | Age: 77
End: 2022-05-17
Attending: FAMILY MEDICINE
Payer: MEDICARE

## 2022-05-17 DIAGNOSIS — K21.9 GASTROESOPHAGEAL REFLUX DISEASE WITHOUT ESOPHAGITIS: ICD-10-CM

## 2022-05-17 DIAGNOSIS — I50.32 CHRONIC DIASTOLIC HEART FAILURE: Primary | ICD-10-CM

## 2022-05-17 DIAGNOSIS — M1A.09X0 IDIOPATHIC CHRONIC GOUT OF MULTIPLE SITES WITHOUT TOPHUS: ICD-10-CM

## 2022-05-17 DIAGNOSIS — I25.118 CORONARY ARTERY DISEASE OF NATIVE ARTERY OF NATIVE HEART WITH STABLE ANGINA PECTORIS: Primary | ICD-10-CM

## 2022-05-17 LAB
ALBUMIN SERPL BCP-MCNC: 3 G/DL (ref 3.5–5.2)
ALP SERPL-CCNC: 86 U/L (ref 55–135)
ALT SERPL W/O P-5'-P-CCNC: 9 U/L (ref 10–44)
ANION GAP SERPL CALC-SCNC: 12 MMOL/L (ref 8–16)
AST SERPL-CCNC: 14 U/L (ref 10–40)
BILIRUB SERPL-MCNC: 0.4 MG/DL (ref 0.1–1)
BNP SERPL-MCNC: 111 PG/ML (ref 0–99)
BUN SERPL-MCNC: 26 MG/DL (ref 8–23)
CALCIUM SERPL-MCNC: 8.9 MG/DL (ref 8.7–10.5)
CHLORIDE SERPL-SCNC: 100 MMOL/L (ref 95–110)
CO2 SERPL-SCNC: 30 MMOL/L (ref 23–29)
CREAT SERPL-MCNC: 1.5 MG/DL (ref 0.5–1.4)
EST. GFR  (AFRICAN AMERICAN): 39.3 ML/MIN/1.73 M^2
EST. GFR  (NON AFRICAN AMERICAN): 34.1 ML/MIN/1.73 M^2
GLUCOSE SERPL-MCNC: 103 MG/DL (ref 70–110)
POTASSIUM SERPL-SCNC: 4.8 MMOL/L (ref 3.5–5.1)
PROT SERPL-MCNC: 6 G/DL (ref 6–8.4)
SODIUM SERPL-SCNC: 142 MMOL/L (ref 136–145)

## 2022-05-17 PROCEDURE — 83880 ASSAY OF NATRIURETIC PEPTIDE: CPT | Performed by: FAMILY MEDICINE

## 2022-05-17 PROCEDURE — 80053 COMPREHEN METABOLIC PANEL: CPT | Performed by: FAMILY MEDICINE

## 2022-05-17 PROCEDURE — 85025 COMPLETE CBC W/AUTO DIFF WBC: CPT | Performed by: FAMILY MEDICINE

## 2022-05-17 RX ORDER — ASPIRIN 81 MG/1
81 TABLET ORAL EVERY MORNING
Qty: 90 TABLET | Refills: 3 | Status: ON HOLD | OUTPATIENT
Start: 2022-05-17 | End: 2023-07-23 | Stop reason: HOSPADM

## 2022-05-17 RX ORDER — TORSEMIDE 10 MG/1
10 TABLET ORAL DAILY
Qty: 30 TABLET | Refills: 5 | Status: SHIPPED | OUTPATIENT
Start: 2022-05-17 | End: 2022-12-21 | Stop reason: SDUPTHER

## 2022-05-17 RX ORDER — PANTOPRAZOLE SODIUM 40 MG/1
40 TABLET, DELAYED RELEASE ORAL DAILY
Qty: 30 TABLET | Refills: 5 | Status: SHIPPED | OUTPATIENT
Start: 2022-05-17 | End: 2022-12-21 | Stop reason: SDUPTHER

## 2022-05-17 RX ORDER — COLCHICINE 0.6 MG/1
TABLET ORAL
Qty: 15 TABLET | Refills: 11 | Status: ON HOLD | OUTPATIENT
Start: 2022-05-17 | End: 2023-01-19 | Stop reason: HOSPADM

## 2022-05-17 RX ORDER — ALLOPURINOL 100 MG/1
50 TABLET ORAL EVERY OTHER DAY
Qty: 30 TABLET | Refills: 1 | Status: SHIPPED | OUTPATIENT
Start: 2022-05-17 | End: 2022-06-16

## 2022-05-17 RX ORDER — NITROGLYCERIN 400 UG/1
SPRAY ORAL
Qty: 12 G | Refills: 0 | Status: SHIPPED | OUTPATIENT
Start: 2022-05-17 | End: 2022-06-29

## 2022-05-17 RX ORDER — DILTIAZEM HYDROCHLORIDE 120 MG/1
120 CAPSULE, COATED, EXTENDED RELEASE ORAL DAILY
Qty: 30 CAPSULE | Refills: 5 | Status: SHIPPED | OUTPATIENT
Start: 2022-05-17 | End: 2022-12-21 | Stop reason: SDUPTHER

## 2022-05-17 NOTE — TELEPHONE ENCOUNTER
----- Message from Kaur Browne sent at 5/17/2022  2:28 PM CDT -----  Aurora with [facility]  calling said the pt was started on 5 new med's during her last hospital stay.  They need refills sent to [facility] [address]   Allopurinol 100 mg 1/2 tab every other day  Asprin 81 mg q day  Colchicine 0.6 mg 1/2 tab every other day   Cardizem 120 mg qday  Torsemide 10 mg qday  Pantoprazole 40 mg qday  Malik Spray    # [phone]

## 2022-05-18 ENCOUNTER — TELEPHONE (OUTPATIENT)
Dept: FAMILY MEDICINE | Facility: CLINIC | Age: 77
End: 2022-05-18

## 2022-05-18 LAB
BASOPHILS # BLD AUTO: 0.03 K/UL (ref 0–0.2)
BASOPHILS NFR BLD: 0.4 % (ref 0–1.9)
DIFFERENTIAL METHOD: ABNORMAL
EOSINOPHIL # BLD AUTO: 0.2 K/UL (ref 0–0.5)
EOSINOPHIL NFR BLD: 2.2 % (ref 0–8)
ERYTHROCYTE [DISTWIDTH] IN BLOOD BY AUTOMATED COUNT: 13.3 % (ref 11.5–14.5)
HCT VFR BLD AUTO: 37.9 % (ref 37–48.5)
HGB BLD-MCNC: 11.4 G/DL (ref 12–16)
IMM GRANULOCYTES # BLD AUTO: 0.03 K/UL (ref 0–0.04)
IMM GRANULOCYTES NFR BLD AUTO: 0.4 % (ref 0–0.5)
LYMPHOCYTES # BLD AUTO: 0.9 K/UL (ref 1–4.8)
LYMPHOCYTES NFR BLD: 11.3 % (ref 18–48)
MCH RBC QN AUTO: 28.4 PG (ref 27–31)
MCHC RBC AUTO-ENTMCNC: 30.1 G/DL (ref 32–36)
MCV RBC AUTO: 94 FL (ref 82–98)
MONOCYTES # BLD AUTO: 0.7 K/UL (ref 0.3–1)
MONOCYTES NFR BLD: 8.5 % (ref 4–15)
NEUTROPHILS # BLD AUTO: 6.4 K/UL (ref 1.8–7.7)
NEUTROPHILS NFR BLD: 77.2 % (ref 38–73)
NRBC BLD-RTO: 0 /100 WBC
PLATELET # BLD AUTO: 188 K/UL (ref 150–450)
PMV BLD AUTO: 12.2 FL (ref 9.2–12.9)
RBC # BLD AUTO: 4.02 M/UL (ref 4–5.4)
WBC # BLD AUTO: 8.22 K/UL (ref 3.9–12.7)

## 2022-05-18 NOTE — TELEPHONE ENCOUNTER
Spoke to Aurora LAMBERT nurse. She states pt has a stage 1 pressure ulcer. She was wanting to get a verbal order for a barrier cream and foam dressing. Gave verbal ok

## 2022-05-20 ENCOUNTER — DOCUMENT SCAN (OUTPATIENT)
Dept: HOME HEALTH SERVICES | Facility: HOSPITAL | Age: 77
End: 2022-05-20
Payer: MEDICARE

## 2022-05-23 ENCOUNTER — TELEPHONE (OUTPATIENT)
Dept: FAMILY MEDICINE | Facility: CLINIC | Age: 77
End: 2022-05-23

## 2022-05-23 NOTE — TELEPHONE ENCOUNTER
"Spoke with pts daughter and offered multiple appointments this week but the daughter refused - states she needed something before 3pm. We do not have anything available. States she isn't "in pain" but discomfort feeling like something metal is inside her ear. The ear doctors that handled the hearing aid do not have an opening until next week. She was wondering if the HH would be able to come out and check her ear   Spoke with Albin with [facility] they do not have otoscopes and are not able to check them.   The daughter is going to bring her to urgent care so they can take a look  "

## 2022-05-23 NOTE — TELEPHONE ENCOUNTER
Spoke with pt who states she'll have to call the daughter back to see when they are available to come in.

## 2022-05-23 NOTE — TELEPHONE ENCOUNTER
----- Message from Khadar Dwyer MD sent at 5/22/2022  7:44 PM CDT -----  Call patient's family.  Her anemia has resolved hematocrit up to 37.9.  Heart failure blood test is much improved , kidneys are no longer dehydrated.  Kidney function improving .  Continue current medications.

## 2022-05-23 NOTE — PROGRESS NOTES
Call patient's family.  Her anemia has resolved hematocrit up to 37.9.  Heart failure blood test is much improved , kidneys are no longer dehydrated.  Kidney function improving .  Continue current medications.

## 2022-05-23 NOTE — TELEPHONE ENCOUNTER
----- Message from [facility], [facility] sent at 5/23/2022  3:49 PM CDT -----  Daughter, Marisol, said that she feels like she has metal in her right zohreh. Said this has been since Friday after she got her hearing aid tuned up. She tried 2 different hearing aids and feels this way with both. [phone]

## 2022-05-23 NOTE — TELEPHONE ENCOUNTER
----- Message from Ronda Winter sent at 5/23/2022  3:12 PM CDT -----  - pt would like to see someone to check her ear. There is a piece of metal in her ear. Not sure if it is her hearing aid   [phone]

## 2022-05-25 ENCOUNTER — TELEPHONE (OUTPATIENT)
Dept: FAMILY MEDICINE | Facility: CLINIC | Age: 77
End: 2022-05-25

## 2022-05-25 NOTE — TELEPHONE ENCOUNTER
Spoke with pharmacy and let them know okay to fill diltiazem and atorvastatin per bee - pt has been on these.

## 2022-05-25 NOTE — TELEPHONE ENCOUNTER
----- Message from Ronda Winter sent at 5/25/2022  3:49 PM CDT -----  Shan- [facility] is calling about diltiazem interaction with her cholesterol medication. Please advise   [phone]

## 2022-06-07 ENCOUNTER — TELEPHONE (OUTPATIENT)
Dept: FAMILY MEDICINE | Facility: CLINIC | Age: 77
End: 2022-06-07

## 2022-06-07 NOTE — TELEPHONE ENCOUNTER
----- Message from Ronda Winter sent at 6/7/2022  1:22 PM CDT -----  Vm- [facility] is calling about a drug interaction   [phone]

## 2022-06-08 ENCOUNTER — PATIENT OUTREACH (OUTPATIENT)
Dept: HOME HEALTH SERVICES | Facility: HOSPITAL | Age: 77
End: 2022-06-08

## 2022-06-13 DIAGNOSIS — J96.11 CHRONIC HYPOXEMIC RESPIRATORY FAILURE: ICD-10-CM

## 2022-06-13 RX ORDER — UMECLIDINIUM 62.5 UG/1
62.5 AEROSOL, POWDER ORAL EVERY MORNING
Qty: 30 EACH | Refills: 11 | Status: SHIPPED | OUTPATIENT
Start: 2022-06-13 | End: 2023-06-27 | Stop reason: SDUPTHER

## 2022-06-13 RX ORDER — IPRATROPIUM BROMIDE AND ALBUTEROL 20; 100 UG/1; UG/1
2 SPRAY, METERED RESPIRATORY (INHALATION) EVERY 4 HOURS PRN
Qty: 12 G | Refills: 3 | Status: SHIPPED | OUTPATIENT
Start: 2022-06-13 | End: 2023-06-27 | Stop reason: SDUPTHER

## 2022-06-13 NOTE — TELEPHONE ENCOUNTER
----- Message from Ronda Winter sent at 6/13/2022 10:35 AM CDT -----  - sudhir with [facility]  incruse elipta, and combivent respimat   [facility] [address]   [phone] sudhir

## 2022-06-15 ENCOUNTER — DOCUMENT SCAN (OUTPATIENT)
Dept: HOME HEALTH SERVICES | Facility: HOSPITAL | Age: 77
End: 2022-06-15
Payer: MEDICARE

## 2022-06-15 ENCOUNTER — EXTERNAL HOME HEALTH (OUTPATIENT)
Dept: HOME HEALTH SERVICES | Facility: HOSPITAL | Age: 77
End: 2022-06-15
Payer: MEDICARE

## 2022-06-15 ENCOUNTER — TELEPHONE (OUTPATIENT)
Dept: FAMILY MEDICINE | Facility: CLINIC | Age: 77
End: 2022-06-15

## 2022-06-15 NOTE — TELEPHONE ENCOUNTER
"Per Dr Dwyer, "increase Torsemide 10mg to BID x 1 week." Tried calling  nurse with no answer. Called patients daughter and let her know the above   "

## 2022-06-15 NOTE — TELEPHONE ENCOUNTER
----- Message from Anni López sent at 6/15/2022 11:45 AM CDT -----  Natalie with [facility] called and stated that the patient had a ten pound weight gain since Monday and she does not look swollen but she is having more shortness of breath she is using the same scale that she always uses if any questions please give her a call at [phone]

## 2022-06-20 ENCOUNTER — TELEPHONE (OUTPATIENT)
Dept: FAMILY MEDICINE | Facility: CLINIC | Age: 77
End: 2022-06-20

## 2022-06-20 RX ORDER — CLINDAMYCIN HYDROCHLORIDE 150 MG/1
150 CAPSULE ORAL 3 TIMES DAILY
Qty: 30 CAPSULE | Refills: 0 | Status: SHIPPED | OUTPATIENT
Start: 2022-06-20 | End: 2022-06-29

## 2022-06-20 NOTE — TELEPHONE ENCOUNTER
Spoke with JENY/[facility]. A nurse is going out to check on her today and they will call. Daughter is aware.

## 2022-06-20 NOTE — TELEPHONE ENCOUNTER
----- Message from Diana Pete sent at 6/20/2022  1:47 PM CDT -----  Contact: Aurora  Type:  Needs Medical Advice    Who Called: aurora with [facility]       Would the patient rather a call back or a response via [facility]?  Call    Best Call Back Number:  [phone]    Additional Information:  aurora with [facility] needs top speak to the nurse about a wound to her sacrum and right buttocks, increased drainage and increased pain that's interrupting sleep         Please call to advise

## 2022-06-20 NOTE — TELEPHONE ENCOUNTER
Spoke with Aurora, states it looks infected. Hard in some areas, very red and inflamed. Increased drainage. It has been there over a month. Nurse states she can't tell if it needs to be drained or not. Per Antonieta we are going to send abx and they are going to send referral to [facility]

## 2022-06-20 NOTE — TELEPHONE ENCOUNTER
----- Message from Ronda Winter sent at 6/20/2022  2:22 PM CDT -----  - home health nurse is calling back   [phone] sudhir

## 2022-06-20 NOTE — TELEPHONE ENCOUNTER
----- Message from Ronda Winter sent at 6/20/2022 11:59 AM CDT -----  Pt has an infection on her butt. It was a boil and she needs antibiotics. Hurts really bad  [facility] [address]   [phone]

## 2022-06-20 NOTE — TELEPHONE ENCOUNTER
----- Message from Anni López sent at 6/20/2022  9:01 AM CDT -----   1945 @1:24 PM :patient called and stated that she she a boil and it hurst she would like to have the nurse call her back at [phone]

## 2022-06-20 NOTE — TELEPHONE ENCOUNTER
----- Message from Ronda Winter sent at 6/20/2022 11:59 AM CDT -----  Pt has an infection on her butt. It was a boil and she needs antibiotics. Hurts really bad  [facility] [address]   [phone]

## 2022-06-29 ENCOUNTER — HOSPITAL ENCOUNTER (OUTPATIENT)
Facility: HOSPITAL | Age: 77
Discharge: HOME | End: 2022-06-30
Attending: EMERGENCY MEDICINE | Admitting: FAMILY MEDICINE
Payer: MEDICARE

## 2022-06-29 DIAGNOSIS — R07.9 CHEST PAIN: ICD-10-CM

## 2022-06-29 LAB
ALBUMIN SERPL BCP-MCNC: 3.2 G/DL (ref 3.5–5.2)
ALP SERPL-CCNC: 69 U/L (ref 55–135)
ALT SERPL W/O P-5'-P-CCNC: 17 U/L (ref 10–44)
ANION GAP SERPL CALC-SCNC: 7 MMOL/L (ref 8–16)
AST SERPL-CCNC: 25 U/L (ref 10–40)
BASOPHILS # BLD AUTO: 0.05 K/UL (ref 0–0.2)
BASOPHILS NFR BLD: 0.6 % (ref 0–1.9)
BILIRUB SERPL-MCNC: 0.6 MG/DL (ref 0.1–1)
BNP SERPL-MCNC: 234 PG/ML (ref 0–99)
BUN SERPL-MCNC: 33 MG/DL (ref 8–23)
CALCIUM SERPL-MCNC: 9 MG/DL (ref 8.7–10.5)
CHLORIDE SERPL-SCNC: 103 MMOL/L (ref 95–110)
CO2 SERPL-SCNC: 31 MMOL/L (ref 23–29)
CREAT SERPL-MCNC: 1.5 MG/DL (ref 0.5–1.4)
DIFFERENTIAL METHOD: ABNORMAL
EOSINOPHIL # BLD AUTO: 0.1 K/UL (ref 0–0.5)
EOSINOPHIL NFR BLD: 1.6 % (ref 0–8)
ERYTHROCYTE [DISTWIDTH] IN BLOOD BY AUTOMATED COUNT: 14.4 % (ref 11.5–14.5)
EST. GFR  (AFRICAN AMERICAN): 39 ML/MIN/1.73 M^2
EST. GFR  (NON AFRICAN AMERICAN): 33.8 ML/MIN/1.73 M^2
GLUCOSE SERPL-MCNC: 126 MG/DL (ref 70–110)
HCT VFR BLD AUTO: 34.8 % (ref 37–48.5)
HGB BLD-MCNC: 10.6 G/DL (ref 12–16)
IMM GRANULOCYTES # BLD AUTO: 0.03 K/UL (ref 0–0.04)
IMM GRANULOCYTES NFR BLD AUTO: 0.4 % (ref 0–0.5)
LYMPHOCYTES # BLD AUTO: 1.2 K/UL (ref 1–4.8)
LYMPHOCYTES NFR BLD: 14.6 % (ref 18–48)
MCH RBC QN AUTO: 28 PG (ref 27–31)
MCHC RBC AUTO-ENTMCNC: 30.5 G/DL (ref 32–36)
MCV RBC AUTO: 92 FL (ref 82–98)
MONOCYTES # BLD AUTO: 0.6 K/UL (ref 0.3–1)
MONOCYTES NFR BLD: 7.3 % (ref 4–15)
NEUTROPHILS # BLD AUTO: 6.4 K/UL (ref 1.8–7.7)
NEUTROPHILS NFR BLD: 75.5 % (ref 38–73)
NRBC BLD-RTO: 0 /100 WBC
PLATELET # BLD AUTO: 209 K/UL (ref 150–450)
PMV BLD AUTO: 11.4 FL (ref 9.2–12.9)
POTASSIUM SERPL-SCNC: 4.5 MMOL/L (ref 3.5–5.1)
PROT SERPL-MCNC: 6.3 G/DL (ref 6–8.4)
RBC # BLD AUTO: 3.79 M/UL (ref 4–5.4)
SODIUM SERPL-SCNC: 141 MMOL/L (ref 136–145)
TROPONIN I SERPL DL<=0.01 NG/ML-MCNC: 0.03 NG/ML
WBC # BLD AUTO: 8.51 K/UL (ref 3.9–12.7)

## 2022-06-29 PROCEDURE — 83880 ASSAY OF NATRIURETIC PEPTIDE: CPT | Performed by: STUDENT IN AN ORGANIZED HEALTH CARE EDUCATION/TRAINING PROGRAM

## 2022-06-29 PROCEDURE — 99285 EMERGENCY DEPT VISIT HI MDM: CPT | Mod: 25

## 2022-06-29 PROCEDURE — 93010 ELECTROCARDIOGRAM REPORT: CPT | Mod: ,,, | Performed by: INTERNAL MEDICINE

## 2022-06-29 PROCEDURE — U0002 COVID-19 LAB TEST NON-CDC: HCPCS | Performed by: STUDENT IN AN ORGANIZED HEALTH CARE EDUCATION/TRAINING PROGRAM

## 2022-06-29 PROCEDURE — 85025 COMPLETE CBC W/AUTO DIFF WBC: CPT | Performed by: STUDENT IN AN ORGANIZED HEALTH CARE EDUCATION/TRAINING PROGRAM

## 2022-06-29 PROCEDURE — 93010 EKG 12-LEAD: ICD-10-PCS | Mod: ,,, | Performed by: INTERNAL MEDICINE

## 2022-06-29 PROCEDURE — G0378 HOSPITAL OBSERVATION PER HR: HCPCS | Mod: CS

## 2022-06-29 PROCEDURE — 80053 COMPREHEN METABOLIC PANEL: CPT | Performed by: STUDENT IN AN ORGANIZED HEALTH CARE EDUCATION/TRAINING PROGRAM

## 2022-06-29 PROCEDURE — 93005 ELECTROCARDIOGRAM TRACING: CPT | Performed by: INTERNAL MEDICINE

## 2022-06-29 PROCEDURE — 84484 ASSAY OF TROPONIN QUANT: CPT | Performed by: STUDENT IN AN ORGANIZED HEALTH CARE EDUCATION/TRAINING PROGRAM

## 2022-06-29 RX ORDER — MORPHINE SULFATE 2 MG/ML
2 INJECTION, SOLUTION INTRAMUSCULAR; INTRAVENOUS EVERY 4 HOURS PRN
Status: DISCONTINUED | OUTPATIENT
Start: 2022-06-30 | End: 2022-06-30 | Stop reason: HOSPADM

## 2022-06-29 RX ORDER — ENOXAPARIN SODIUM 100 MG/ML
1 INJECTION SUBCUTANEOUS ONCE
Status: COMPLETED | OUTPATIENT
Start: 2022-06-30 | End: 2022-06-30

## 2022-06-30 VITALS
HEART RATE: 61 BPM | SYSTOLIC BLOOD PRESSURE: 136 MMHG | WEIGHT: 180.75 LBS | HEIGHT: 60 IN | OXYGEN SATURATION: 98 % | TEMPERATURE: 98 F | RESPIRATION RATE: 16 BRPM | DIASTOLIC BLOOD PRESSURE: 61 MMHG | BODY MASS INDEX: 35.49 KG/M2

## 2022-06-30 PROBLEM — K52.9 ACUTE COLITIS: Status: RESOLVED | Noted: 2019-09-26 | Resolved: 2022-06-30

## 2022-06-30 PROBLEM — I16.0 HYPERTENSIVE URGENCY: Status: RESOLVED | Noted: 2022-02-10 | Resolved: 2022-06-30

## 2022-06-30 PROBLEM — E87.5 HYPERKALEMIA: Status: RESOLVED | Noted: 2022-02-10 | Resolved: 2022-06-30

## 2022-06-30 PROBLEM — R69 MULTIPLE CHRONIC DISEASES: Status: ACTIVE | Noted: 2022-06-30
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
## 2022-06-30 PROBLEM — J96.20 ACUTE ON CHRONIC RESPIRATORY FAILURE: Status: RESOLVED | Noted: 2022-02-10 | Resolved: 2022-06-30

## 2022-06-30 PROBLEM — R19.7 DIARRHEA: Status: RESOLVED | Noted: 2019-09-18 | Resolved: 2022-06-30

## 2022-06-30 PROBLEM — J18.9 HAP (HOSPITAL-ACQUIRED PNEUMONIA): Status: RESOLVED | Noted: 2022-04-13 | Resolved: 2022-06-30

## 2022-06-30 PROBLEM — I50.33 ACUTE ON CHRONIC DIASTOLIC HEART FAILURE: Status: ACTIVE | Noted: 2022-06-30

## 2022-06-30 PROBLEM — R00.1 BRADYCARDIA: Status: RESOLVED | Noted: 2021-10-07 | Resolved: 2022-06-30

## 2022-06-30 PROBLEM — H60.502 ACUTE OTITIS EXTERNA OF LEFT EAR: Status: RESOLVED | Noted: 2019-01-16 | Resolved: 2022-06-30

## 2022-06-30 LAB
ANION GAP SERPL CALC-SCNC: 7 MMOL/L (ref 8–16)
BASOPHILS # BLD AUTO: 0.03 K/UL (ref 0–0.2)
BASOPHILS NFR BLD: 0.4 % (ref 0–1.9)
BUN SERPL-MCNC: 30 MG/DL (ref 8–23)
CALCIUM SERPL-MCNC: 9.1 MG/DL (ref 8.7–10.5)
CHLORIDE SERPL-SCNC: 101 MMOL/L (ref 95–110)
CO2 SERPL-SCNC: 32 MMOL/L (ref 23–29)
CREAT SERPL-MCNC: 1.5 MG/DL (ref 0.5–1.4)
CRP SERPL-MCNC: 0.09 MG/DL
DIFFERENTIAL METHOD: ABNORMAL
EOSINOPHIL # BLD AUTO: 0.1 K/UL (ref 0–0.5)
EOSINOPHIL NFR BLD: 1.7 % (ref 0–8)
ERYTHROCYTE [DISTWIDTH] IN BLOOD BY AUTOMATED COUNT: 14.4 % (ref 11.5–14.5)
EST. GFR  (AFRICAN AMERICAN): 39 ML/MIN/1.73 M^2
EST. GFR  (NON AFRICAN AMERICAN): 33.8 ML/MIN/1.73 M^2
GLUCOSE SERPL-MCNC: 84 MG/DL (ref 70–110)
HCT VFR BLD AUTO: 38.2 % (ref 37–48.5)
HGB BLD-MCNC: 11.6 G/DL (ref 12–16)
IMM GRANULOCYTES # BLD AUTO: 0.02 K/UL (ref 0–0.04)
IMM GRANULOCYTES NFR BLD AUTO: 0.3 % (ref 0–0.5)
LYMPHOCYTES # BLD AUTO: 1.4 K/UL (ref 1–4.8)
LYMPHOCYTES NFR BLD: 20.5 % (ref 18–48)
MAGNESIUM SERPL-MCNC: 2.1 MG/DL (ref 1.6–2.6)
MCH RBC QN AUTO: 28 PG (ref 27–31)
MCHC RBC AUTO-ENTMCNC: 30.4 G/DL (ref 32–36)
MCV RBC AUTO: 92 FL (ref 82–98)
MONOCYTES # BLD AUTO: 0.5 K/UL (ref 0.3–1)
MONOCYTES NFR BLD: 7.6 % (ref 4–15)
NEUTROPHILS # BLD AUTO: 4.8 K/UL (ref 1.8–7.7)
NEUTROPHILS NFR BLD: 69.5 % (ref 38–73)
NRBC BLD-RTO: 0 /100 WBC
PLATELET # BLD AUTO: 211 K/UL (ref 150–450)
PMV BLD AUTO: 11.2 FL (ref 9.2–12.9)
POTASSIUM SERPL-SCNC: 4.3 MMOL/L (ref 3.5–5.1)
RBC # BLD AUTO: 4.15 M/UL (ref 4–5.4)
SARS-COV-2 RDRP RESP QL NAA+PROBE: NEGATIVE
SODIUM SERPL-SCNC: 140 MMOL/L (ref 136–145)
TROPONIN I SERPL DL<=0.01 NG/ML-MCNC: 0.04 NG/ML
TROPONIN I SERPL DL<=0.01 NG/ML-MCNC: 0.04 NG/ML
TROPONIN I SERPL DL<=0.01 NG/ML-MCNC: 0.05 NG/ML
WBC # BLD AUTO: 6.94 K/UL (ref 3.9–12.7)

## 2022-06-30 PROCEDURE — 27000221 HC OXYGEN, UP TO 24 HOURS

## 2022-06-30 PROCEDURE — 85025 COMPLETE CBC W/AUTO DIFF WBC: CPT | Performed by: NURSE PRACTITIONER

## 2022-06-30 PROCEDURE — 96372 THER/PROPH/DIAG INJ SC/IM: CPT | Mod: 59 | Performed by: NURSE PRACTITIONER

## 2022-06-30 PROCEDURE — 96374 THER/PROPH/DIAG INJ IV PUSH: CPT

## 2022-06-30 PROCEDURE — 99220 PR INITIAL OBSERVATION CARE,LEVL III: CPT | Mod: ,,, | Performed by: INTERNAL MEDICINE

## 2022-06-30 PROCEDURE — 25000003 PHARM REV CODE 250: Performed by: NURSE PRACTITIONER

## 2022-06-30 PROCEDURE — 83735 ASSAY OF MAGNESIUM: CPT | Performed by: NURSE PRACTITIONER

## 2022-06-30 PROCEDURE — G0378 HOSPITAL OBSERVATION PER HR: HCPCS

## 2022-06-30 PROCEDURE — 94760 N-INVAS EAR/PLS OXIMETRY 1: CPT

## 2022-06-30 PROCEDURE — 84484 ASSAY OF TROPONIN QUANT: CPT | Mod: 91 | Performed by: INTERNAL MEDICINE

## 2022-06-30 PROCEDURE — 94640 AIRWAY INHALATION TREATMENT: CPT

## 2022-06-30 PROCEDURE — 25000003 PHARM REV CODE 250: Performed by: INTERNAL MEDICINE

## 2022-06-30 PROCEDURE — 36415 COLL VENOUS BLD VENIPUNCTURE: CPT | Performed by: NURSE PRACTITIONER

## 2022-06-30 PROCEDURE — 36415 COLL VENOUS BLD VENIPUNCTURE: CPT | Performed by: INTERNAL MEDICINE

## 2022-06-30 PROCEDURE — 99220 PR INITIAL OBSERVATION CARE,LEVL III: ICD-10-PCS | Mod: ,,, | Performed by: INTERNAL MEDICINE

## 2022-06-30 PROCEDURE — 84484 ASSAY OF TROPONIN QUANT: CPT | Performed by: NURSE PRACTITIONER

## 2022-06-30 PROCEDURE — 25000242 PHARM REV CODE 250 ALT 637 W/ HCPCS: Performed by: NURSE PRACTITIONER

## 2022-06-30 PROCEDURE — 63600175 PHARM REV CODE 636 W HCPCS: Performed by: NURSE PRACTITIONER

## 2022-06-30 PROCEDURE — 86140 C-REACTIVE PROTEIN: CPT | Performed by: NURSE PRACTITIONER

## 2022-06-30 PROCEDURE — 84484 ASSAY OF TROPONIN QUANT: CPT | Mod: 91 | Performed by: NURSE PRACTITIONER

## 2022-06-30 PROCEDURE — 99900031 HC PATIENT EDUCATION (STAT)

## 2022-06-30 PROCEDURE — G0378 HOSPITAL OBSERVATION PER HR: HCPCS | Mod: CS

## 2022-06-30 PROCEDURE — 80048 BASIC METABOLIC PNL TOTAL CA: CPT | Performed by: NURSE PRACTITIONER

## 2022-06-30 RX ORDER — FLUTICASONE FUROATE AND VILANTEROL 100; 25 UG/1; UG/1
1 POWDER RESPIRATORY (INHALATION) DAILY
Status: DISCONTINUED | OUTPATIENT
Start: 2022-06-30 | End: 2022-06-30 | Stop reason: HOSPADM

## 2022-06-30 RX ORDER — POTASSIUM CHLORIDE 20 MEQ/1
20 TABLET, EXTENDED RELEASE ORAL
Status: DISCONTINUED | OUTPATIENT
Start: 2022-06-30 | End: 2022-06-30 | Stop reason: HOSPADM

## 2022-06-30 RX ORDER — ISOSORBIDE MONONITRATE 60 MG/1
60 TABLET, EXTENDED RELEASE ORAL DAILY
Status: DISCONTINUED | OUTPATIENT
Start: 2022-06-30 | End: 2022-06-30

## 2022-06-30 RX ORDER — POTASSIUM CHLORIDE 7.45 MG/ML
20 INJECTION INTRAVENOUS
Status: DISCONTINUED | OUTPATIENT
Start: 2022-06-30 | End: 2022-06-30 | Stop reason: HOSPADM

## 2022-06-30 RX ORDER — MAGNESIUM SULFATE HEPTAHYDRATE 40 MG/ML
4 INJECTION, SOLUTION INTRAVENOUS
Status: DISCONTINUED | OUTPATIENT
Start: 2022-06-30 | End: 2022-06-30 | Stop reason: HOSPADM

## 2022-06-30 RX ORDER — ONDANSETRON 2 MG/ML
4 INJECTION INTRAMUSCULAR; INTRAVENOUS EVERY 8 HOURS PRN
Status: DISCONTINUED | OUTPATIENT
Start: 2022-06-30 | End: 2022-06-30 | Stop reason: HOSPADM

## 2022-06-30 RX ORDER — CHOLESTYRAMINE 4 G/4.8G
4 POWDER, FOR SUSPENSION ORAL 2 TIMES DAILY
Status: DISCONTINUED | OUTPATIENT
Start: 2022-06-30 | End: 2022-06-30 | Stop reason: HOSPADM

## 2022-06-30 RX ORDER — MAGNESIUM SULFATE HEPTAHYDRATE 40 MG/ML
2 INJECTION, SOLUTION INTRAVENOUS
Status: DISCONTINUED | OUTPATIENT
Start: 2022-06-30 | End: 2022-06-30 | Stop reason: HOSPADM

## 2022-06-30 RX ORDER — ALBUTEROL SULFATE 0.83 MG/ML
2.5 SOLUTION RESPIRATORY (INHALATION) EVERY 4 HOURS PRN
Status: DISCONTINUED | OUTPATIENT
Start: 2022-06-30 | End: 2022-06-30 | Stop reason: HOSPADM

## 2022-06-30 RX ORDER — ISOSORBIDE MONONITRATE 60 MG/1
120 TABLET, EXTENDED RELEASE ORAL DAILY
Status: DISCONTINUED | OUTPATIENT
Start: 2022-06-30 | End: 2022-06-30 | Stop reason: HOSPADM

## 2022-06-30 RX ORDER — MAGNESIUM SULFATE 1 G/100ML
1 INJECTION INTRAVENOUS
Status: DISCONTINUED | OUTPATIENT
Start: 2022-06-30 | End: 2022-06-30 | Stop reason: HOSPADM

## 2022-06-30 RX ORDER — ACETAMINOPHEN 325 MG/1
650 TABLET ORAL EVERY 4 HOURS PRN
Status: DISCONTINUED | OUTPATIENT
Start: 2022-06-30 | End: 2022-06-30 | Stop reason: HOSPADM

## 2022-06-30 RX ORDER — POTASSIUM CHLORIDE 20 MEQ/1
40 TABLET, EXTENDED RELEASE ORAL
Status: DISCONTINUED | OUTPATIENT
Start: 2022-06-30 | End: 2022-06-30 | Stop reason: HOSPADM

## 2022-06-30 RX ORDER — POTASSIUM CHLORIDE 7.45 MG/ML
40 INJECTION INTRAVENOUS
Status: DISCONTINUED | OUTPATIENT
Start: 2022-06-30 | End: 2022-06-30 | Stop reason: HOSPADM

## 2022-06-30 RX ORDER — FUROSEMIDE 10 MG/ML
40 INJECTION INTRAMUSCULAR; INTRAVENOUS ONCE
Status: COMPLETED | OUTPATIENT
Start: 2022-06-30 | End: 2022-06-30

## 2022-06-30 RX ORDER — ISOSORBIDE MONONITRATE 120 MG/1
120 TABLET, EXTENDED RELEASE ORAL DAILY
Qty: 90 TABLET | Refills: 0 | Status: SHIPPED | OUTPATIENT
Start: 2022-07-01 | End: 2022-07-12 | Stop reason: SDUPTHER

## 2022-06-30 RX ORDER — NITROGLYCERIN 0.4 MG/1
0.4 TABLET SUBLINGUAL EVERY 5 MIN PRN
Status: DISCONTINUED | OUTPATIENT
Start: 2022-06-30 | End: 2022-06-30 | Stop reason: HOSPADM

## 2022-06-30 RX ORDER — ATORVASTATIN CALCIUM 40 MG/1
80 TABLET, FILM COATED ORAL NIGHTLY
Status: DISCONTINUED | OUTPATIENT
Start: 2022-06-30 | End: 2022-06-30 | Stop reason: HOSPADM

## 2022-06-30 RX ORDER — METOPROLOL SUCCINATE 50 MG/1
50 TABLET, EXTENDED RELEASE ORAL DAILY
Status: DISCONTINUED | OUTPATIENT
Start: 2022-06-30 | End: 2022-06-30 | Stop reason: HOSPADM

## 2022-06-30 RX ORDER — GABAPENTIN 100 MG/1
100 CAPSULE ORAL NIGHTLY
Status: DISCONTINUED | OUTPATIENT
Start: 2022-06-30 | End: 2022-06-30 | Stop reason: HOSPADM

## 2022-06-30 RX ORDER — NALOXONE HCL 0.4 MG/ML
0.02 VIAL (ML) INJECTION
Status: DISCONTINUED | OUTPATIENT
Start: 2022-06-30 | End: 2022-06-30 | Stop reason: HOSPADM

## 2022-06-30 RX ORDER — COLCHICINE 0.6 MG/1
0.6 TABLET, FILM COATED ORAL EVERY OTHER DAY
Status: DISCONTINUED | OUTPATIENT
Start: 2022-06-30 | End: 2022-06-30 | Stop reason: HOSPADM

## 2022-06-30 RX ORDER — SODIUM CHLORIDE 0.9 % (FLUSH) 0.9 %
10 SYRINGE (ML) INJECTION EVERY 12 HOURS PRN
Status: DISCONTINUED | OUTPATIENT
Start: 2022-06-30 | End: 2022-06-30 | Stop reason: HOSPADM

## 2022-06-30 RX ORDER — RANOLAZINE 500 MG/1
500 TABLET, EXTENDED RELEASE ORAL 2 TIMES DAILY
Status: DISCONTINUED | OUTPATIENT
Start: 2022-06-30 | End: 2022-06-30 | Stop reason: HOSPADM

## 2022-06-30 RX ORDER — LANOLIN ALCOHOL/MO/W.PET/CERES
800 CREAM (GRAM) TOPICAL
Status: DISCONTINUED | OUTPATIENT
Start: 2022-06-30 | End: 2022-06-30 | Stop reason: HOSPADM

## 2022-06-30 RX ORDER — AMLODIPINE BESYLATE 5 MG/1
5 TABLET ORAL DAILY
Status: DISCONTINUED | OUTPATIENT
Start: 2022-06-30 | End: 2022-06-30 | Stop reason: HOSPADM

## 2022-06-30 RX ORDER — RANOLAZINE 500 MG/1
500 TABLET, EXTENDED RELEASE ORAL 2 TIMES DAILY
Qty: 120 TABLET | Refills: 0 | Status: SHIPPED | OUTPATIENT
Start: 2022-06-30 | End: 2022-07-12 | Stop reason: SDUPTHER

## 2022-06-30 RX ORDER — TORSEMIDE 10 MG/1
10 TABLET ORAL DAILY
Status: DISCONTINUED | OUTPATIENT
Start: 2022-06-30 | End: 2022-06-30 | Stop reason: HOSPADM

## 2022-06-30 RX ORDER — ASPIRIN 81 MG/1
81 TABLET ORAL EVERY MORNING
Status: DISCONTINUED | OUTPATIENT
Start: 2022-06-30 | End: 2022-06-30 | Stop reason: HOSPADM

## 2022-06-30 RX ORDER — AMOXICILLIN 250 MG
1 CAPSULE ORAL 2 TIMES DAILY
Status: DISCONTINUED | OUTPATIENT
Start: 2022-06-30 | End: 2022-06-30 | Stop reason: HOSPADM

## 2022-06-30 RX ORDER — DILTIAZEM HYDROCHLORIDE 120 MG/1
120 CAPSULE, COATED, EXTENDED RELEASE ORAL DAILY
Status: DISCONTINUED | OUTPATIENT
Start: 2022-06-30 | End: 2022-06-30 | Stop reason: HOSPADM

## 2022-06-30 RX ORDER — POLYETHYLENE GLYCOL 3350 17 G/17G
17 POWDER, FOR SOLUTION ORAL 2 TIMES DAILY PRN
Status: DISCONTINUED | OUTPATIENT
Start: 2022-06-30 | End: 2022-06-30 | Stop reason: HOSPADM

## 2022-06-30 RX ORDER — TALC
6 POWDER (GRAM) TOPICAL NIGHTLY PRN
Status: DISCONTINUED | OUTPATIENT
Start: 2022-06-30 | End: 2022-06-30 | Stop reason: HOSPADM

## 2022-06-30 RX ORDER — PANTOPRAZOLE SODIUM 40 MG/1
40 TABLET, DELAYED RELEASE ORAL
Status: DISCONTINUED | OUTPATIENT
Start: 2022-06-30 | End: 2022-06-30 | Stop reason: HOSPADM

## 2022-06-30 RX ADMIN — RANOLAZINE 500 MG: 500 TABLET, EXTENDED RELEASE ORAL at 03:06

## 2022-06-30 RX ADMIN — ATORVASTATIN CALCIUM 80 MG: 40 TABLET, FILM COATED ORAL at 01:06

## 2022-06-30 RX ADMIN — METOPROLOL SUCCINATE 50 MG: 50 TABLET, FILM COATED, EXTENDED RELEASE ORAL at 09:06

## 2022-06-30 RX ADMIN — FUROSEMIDE 40 MG: 10 INJECTION, SOLUTION INTRAMUSCULAR; INTRAVENOUS at 02:06

## 2022-06-30 RX ADMIN — AMLODIPINE BESYLATE 5 MG: 5 TABLET ORAL at 09:06

## 2022-06-30 RX ADMIN — CHOLESTYRAMINE 4 G: 4 POWDER, FOR SUSPENSION ORAL at 09:06

## 2022-06-30 RX ADMIN — TICAGRELOR 90 MG: 90 TABLET ORAL at 02:06

## 2022-06-30 RX ADMIN — ASPIRIN 81 MG: 81 TABLET, COATED ORAL at 06:06

## 2022-06-30 RX ADMIN — TORSEMIDE 10 MG: 10 TABLET ORAL at 09:06

## 2022-06-30 RX ADMIN — SENNOSIDES AND DOCUSATE SODIUM 1 TABLET: 50; 8.6 TABLET ORAL at 01:06

## 2022-06-30 RX ADMIN — ISOSORBIDE MONONITRATE 120 MG: 60 TABLET, EXTENDED RELEASE ORAL at 09:06

## 2022-06-30 RX ADMIN — DILTIAZEM HYDROCHLORIDE 120 MG: 120 CAPSULE, COATED, EXTENDED RELEASE ORAL at 09:06

## 2022-06-30 RX ADMIN — COLCHICINE 0.6 MG: 0.6 TABLET, FILM COATED ORAL at 09:06

## 2022-06-30 RX ADMIN — ENOXAPARIN SODIUM 80 MG: 80 INJECTION SUBCUTANEOUS at 02:06

## 2022-06-30 RX ADMIN — PANTOPRAZOLE SODIUM 40 MG: 40 TABLET, DELAYED RELEASE ORAL at 06:06

## 2022-06-30 RX ADMIN — FLUTICASONE FUROATE AND VILANTEROL TRIFENATATE 1 PUFF: 100; 25 POWDER RESPIRATORY (INHALATION) at 08:06

## 2022-06-30 NOTE — PLAN OF CARE
Problem: Adult Inpatient Plan of Care  Goal: Plan of Care Review  Outcome: Met  Goal: Patient-Specific Goal (Individualized)  Outcome: Met  Goal: Absence of Hospital-Acquired Illness or Injury  Outcome: Met  Goal: Optimal Comfort and Wellbeing  Outcome: Met  Goal: Readiness for Transition of Care  Outcome: Met     Problem: Diabetes Comorbidity  Goal: Blood Glucose Level Within Targeted Range  Outcome: Met     Problem: Fluid Imbalance (Pneumonia)  Goal: Fluid Balance  Outcome: Met     Problem: Infection (Pneumonia)  Goal: Resolution of Infection Signs and Symptoms  Outcome: Met     Problem: Respiratory Compromise (Pneumonia)  Goal: Effective Oxygenation and Ventilation  Outcome: Met     Problem: Impaired Wound Healing  Goal: Optimal Wound Healing  Outcome: Met     Problem: Skin Injury Risk Increased  Goal: Skin Health and Integrity  Outcome: Met     Problem: Fall Injury Risk  Goal: Absence of Fall and Fall-Related Injury  Outcome: Met     Problem: Chest Pain  Goal: Resolution of Chest Pain Symptoms  Outcome: Met     Problem: Oral Intake Inadequate  Goal: Improved Oral Intake  Outcome: Met

## 2022-06-30 NOTE — PLAN OF CARE
06/30/22 1052   BURROWS Message   Medicare Outpatient and Observation Notification regarding financial responsibility Explained to patient/caregiver;Signed/date by patient/caregiver   Date BURROWS was signed 06/30/22   Time BURROWS was signed 1046

## 2022-06-30 NOTE — HPI
Ms. 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 It is severe. Chest pain occurred at rest, was substernal, did not radiate, and was associated with palpitations, nausea, sweats, increased SOB, and lightheadedness. She denies fever, chills, V/D, cough, or LOC. She took 2 sprays of NTG 5 min apart without relief and called EMS. She took 2 more sprays with NTG with EMS with improvement. She is currently sleeping on my arrival in the room and chest pain free. EKG is NSR with no acute ischemic changes. Initial troponin is 0.03.      She had left heart catheterization 2/2022 severe triple-vessel disease and had complication with rectus sheet hematoma/bleeding and [place] [facility] [place] and embolization was done recently and admitted to rehab there and got discharged recently.    She was admitted in April and again transferred to [facility] for high risk PCI, but developed COPD exac and this was deferred. She states that she was told she was too high risk, they are not planning on doing an angiogram for high risk PCI, and she will be medically managed.

## 2022-06-30 NOTE — SUBJECTIVE & OBJECTIVE
Past Medical History:   Diagnosis Date    CAD (coronary artery disease)     Cancer     colon    CHF (congestive heart failure)     Colitis     Colon cancer     COPD (chronic obstructive pulmonary disease)     Decreased hearing     Diabetes mellitus     Diabetes mellitus, type 2     Hypercholesterolemia     Hypertension     Hypoxemia     Insomnia     Obesity     Osteoarthritis        Past Surgical History:   Procedure Laterality Date    ANGIOGRAM, CORONARY, WITH LEFT HEART CATHETERIZATION N/A 2/10/2022    Procedure: Angiogram, Coronary, with Left Heart Cath;  Surgeon: Cristian Benjamin MD;  Location: [facility] CATH/EP LAB;  Service: Cardiology;  Laterality: N/A;    CARDIAC CATHETERIZATION      CHOLECYSTECTOMY      COLECTOMY      CORONARY ANGIOPLASTY      CORONARY STENT PLACEMENT      EXTERNAL EAR SURGERY      EYE SURGERY      FLEXIBLE SIGMOIDOSCOPY N/A 9/19/2019    Procedure: SIGMOIDOSCOPY, FLEXIBLE;  Surgeon: Biju Kramer III, MD;  Location: [facility] ENDO;  Service: Endoscopy;  Laterality: N/A;    HYSTERECTOMY      partial       Review of patient's allergies indicates:   Allergen Reactions    Iodinated contrast media     Strawberries [strawberry] Hives and Itching       No current facility-administered medications on file prior to encounter.     Current Outpatient Medications on File Prior to Encounter   Medication Sig    aspirin (ECOTRIN) 81 MG EC tablet Take 1 tablet (81 mg total) by mouth every morning.    atorvastatin (LIPITOR) 80 MG tablet Take 1 tablet (80 mg total) by mouth once daily. (Patient taking differently: Take 80 mg by mouth every evening.)    cholestyramine (QUESTRAN) 4 gram packet Take 1 packet (4 g total) by mouth 2 (two) times daily.    coenzyme Q10 100 mg capsule Take 100 mg by mouth every morning.    colchicine (COLCRYS) 0.6 mg tablet Take 1/2 tablet by mouth every other day (Patient taking differently: Take 0.6 mg by mouth every other day. Take 1/2 tablet by mouth every other day)     diltiaZEM (CARDIZEM CD) 120 MG Cp24 Take 1 capsule (120 mg total) by mouth once daily.    fish oil-omega-3 fatty acids 300-1,000 mg capsule Take 2 capsules by mouth every morning.    gabapentin (NEURONTIN) 100 MG capsule Take 1 capsule (100 mg total) by mouth every evening.    metoprolol succinate (TOPROL-XL) 50 MG 24 hr tablet Take 1 tablet (50 mg total) by mouth once daily.    pantoprazole (PROTONIX) 40 MG tablet Take 1 tablet (40 mg total) by mouth once daily.    ticagrelor (BRILINTA) 90 mg tablet Take 1 tablet (90 mg total) by mouth 2 (two) times a day.    torsemide (DEMADEX) 10 MG Tab Take 1 tablet (10 mg total) by mouth once daily.    umeclidinium (INCRUSE ELLIPTA) 62.5 mcg/actuation inhalation capsule Inhale 62.5 mcg into the lungs every morning. Controller    vit C/E/Zn/coppr/lutein/zeaxan (PRESERVISION AREDS-2 ORAL) Take 1 tablet by mouth once daily.    acetaminophen (TYLENOL) 325 MG tablet Take 2 tablets (650 mg total) by mouth every 4 (four) hours as needed.    albuterol (VENTOLIN HFA) 90 mcg/actuation inhaler Inhale 2 puffs into the lungs every 6 (six) hours as needed for Wheezing or Shortness of Breath. Rescue    ergocalciferol (VITAMIN D2) 50,000 unit Cap Take 1 capsule (50,000 Units total) by mouth every 7 days. (Patient taking differently: Take 50,000 Units by mouth every Monday.)    fluticasone-salmeterol diskus inhaler 250-50 mcg Inhale 1 puff into the lungs 2 (two) times daily.    ipratropium-albuteroL (COMBIVENT RESPIMAT)  mcg/actuation inhaler Inhale 2 puffs into the lungs every 4 (four) hours as needed for Shortness of Breath. Rescue    isosorbide mononitrate (IMDUR) 60 MG 24 hr tablet Take 1 tablet (60 mg total) by mouth once daily.    [DISCONTINUED] amLODIPine (NORVASC) 10 MG tablet Take 1 tablet (10 mg total) by mouth once daily. (Patient taking differently: No sig reported)    [DISCONTINUED] benazepriL (LOTENSIN) 40 MG tablet Take 1 tablet (40 mg total) by mouth once daily.     [DISCONTINUED] cimetidine (TAGAMET) 300 MG tablet Take 1 tablet (300 mg total) by mouth every 6 (six) hours. Take 1 tablet (300 mg total) by mouth starting at 6 PM on 2022 (the evening before your angiogram procedure). Then take 1 tablet at 12 AM, then take 1 tablet at 6 AM on .    [DISCONTINUED] furosemide (LASIX) 20 MG tablet Take 2 tablets (40 mg total) by mouth once daily.    [DISCONTINUED] lisinopriL 10 MG tablet Take 1 tablet (10 mg total) by mouth once daily. HOLD UNTIL OTHERWISE DIRECTED BY PRIMARY CARE PROVIDER    [DISCONTINUED] lovastatin (MEVACOR) 40 MG tablet Take 1 tablet (40 mg total) by mouth every other day.     Family History       Problem Relation (Age of Onset)    Febrile seizures Mother    Heart failure Father          Tobacco Use    Smoking status: Former Smoker     Packs/day: 3.00     Years: 50.00     Pack years: 150.00     Types: Cigarettes     Start date: 3/30/1964     Quit date: 2006     Years since quittin.3    Smokeless tobacco: Never Used    Tobacco comment: ex smoker 15 years   Substance and Sexual Activity    Alcohol use: Yes    Drug use: No    Sexual activity: Never     Review of Systems   Constitutional:  Positive for diaphoresis and fatigue. Negative for activity change, appetite change, chills, fever and unexpected weight change.   HENT:  Negative for congestion, ear pain, facial swelling, hearing loss, sore throat and trouble swallowing.    Eyes:  Negative for pain and discharge.   Respiratory:  Positive for shortness of breath. Negative for cough, chest tightness and wheezing.    Cardiovascular:  Positive for chest pain and palpitations. Negative for leg swelling.   Gastrointestinal:  Positive for nausea. Negative for abdominal pain, blood in stool, diarrhea and vomiting.   Endocrine: Negative for polydipsia, polyphagia and polyuria.   Genitourinary:  Negative for difficulty urinating, dysuria, flank pain, frequency and urgency.    Musculoskeletal:  Negative for arthralgias, back pain, joint swelling, neck pain and neck stiffness.   Skin:  Negative for rash and wound.   Allergic/Immunologic: Negative for environmental allergies and immunocompromised state.   Neurological:  Positive for dizziness and light-headedness. Negative for seizures, syncope, speech difficulty, weakness, numbness and headaches.   Hematological:  Negative for adenopathy.   Psychiatric/Behavioral:  Negative for sleep disturbance and suicidal ideas. The patient is not nervous/anxious.    All other systems reviewed and are negative.  Objective:     Vital Signs (Most Recent):  Temp: 97.7 °F (36.5 °C) (06/30/22 0134)  Pulse: 86 (06/30/22 0134)  Resp: (!) 21 (06/29/22 2231)  BP: (!) 185/80 (06/30/22 0134)  SpO2: 99 % (06/30/22 0134)   Vital Signs (24h Range):  Temp:  [97.7 °F (36.5 °C)] 97.7 °F (36.5 °C)  Pulse:  [86-91] 86  Resp:  [15-21] 21  SpO2:  [98 %-100 %] 99 %  BP: (139-185)/(64-80) 185/80     Weight: 81.9 kg (180 lb 8.9 oz)  Body mass index is 35.26 kg/m².    Physical Exam  Vitals and nursing note reviewed.   Constitutional:       Appearance: Normal appearance.   HENT:      Head: Normocephalic and atraumatic.      Nose: Nose normal.      Mouth/Throat:      Mouth: Mucous membranes are moist.      Pharynx: Oropharynx is clear.   Eyes:      Extraocular Movements: Extraocular movements intact.      Pupils: Pupils are equal, round, and reactive to light.   Cardiovascular:      Rate and Rhythm: Normal rate and regular rhythm.      Pulses: Normal pulses.   Pulmonary:      Effort: Pulmonary effort is normal.      Comments: Diminished in bases   Abdominal:      General: Bowel sounds are normal.      Palpations: Abdomen is soft.   Musculoskeletal:         General: Normal range of motion.      Cervical back: Normal range of motion and neck supple.      Right lower leg: Edema present.      Left lower leg: Edema present.      Comments: Bilat LE edema 2+ with bilat venous stasis  dermatitis    Skin:     General: Skin is warm and dry.      Capillary Refill: Capillary refill takes less than 2 seconds.   Neurological:      General: No focal deficit present.      Mental Status: She is alert and oriented to person, place, and time.   Psychiatric:         Mood and Affect: Mood normal.         Behavior: Behavior normal.         CRANIAL NERVES     CN III, IV, VI   Pupils are equal, round, and reactive to light.     Significant Labs: All pertinent labs within the past 24 hours have been reviewed.  CBC:   Recent Labs   Lab 06/29/22 2157   WBC 8.51   HGB 10.6*   HCT 34.8*        CMP:   Recent Labs   Lab 06/29/22 2159      K 4.5      CO2 31*   *   BUN 33*   CREATININE 1.5*   CALCIUM 9.0   PROT 6.3   ALBUMIN 3.2*   BILITOT 0.6   ALKPHOS 69   AST 25   ALT 17   ANIONGAP 7*   EGFRNONAA 33.8*     Cardiac Markers:   Recent Labs   Lab 06/29/22 2159   *     Troponin:   Recent Labs   Lab 06/29/22 2159   TROPONINI 0.030       Significant Imaging: I have reviewed all pertinent imaging results/findings within the past 24 hours.  EKG: I have reviewed all pertinent results/findings within the past 24 hours and my personal findings are: NSR, no acute ischemic changes  CXR awaiting official radiologist interpretation, with bilat lower lobe patchy opacities

## 2022-06-30 NOTE — CARE UPDATE
06/30/22 0806   Patient Assessment/Suction   Level of Consciousness (AVPU) alert   Respiratory Effort Unlabored   Expansion/Accessory Muscles/Retractions expansion symmetric   All Lung Fields Breath Sounds clear   Rhythm/Pattern, Respiratory unlabored;pattern regular   Cough Frequency infrequent   Cough Type no productive sputum   PRE-TX-O2   O2 Device (Oxygen Therapy) nasal cannula   $ Is the patient on Low Flow Oxygen? Yes   SpO2 99 %   Pulse Oximetry Type Intermittent   $ Pulse Oximetry - Single Charge Pulse Oximetry - Single   Pulse 72   Resp 14   Inhaler   $ Inhaler Charges MDI (Metered Dose Inahler) Treatment   Daily Review of Necessity (Inhaler) completed   Respiratory Treatment Status (Inhaler) given;mouth rinsed post treatment   Treatment Route (Inhaler) mouthpiece   Patient Position (Inhaler) semi-Solorzano's   Post Treatment Assessment (Inhaler) breath sounds unchanged   Signs of Intolerance (Inhaler) none   Education   $ Education Bronchodilator;15 min

## 2022-06-30 NOTE — ASSESSMENT & PLAN NOTE
Admit to cardiology B  Trend troponin - first is negative  ASA/brilinta  1 time dose of wt based lovenox   Continue imdur   Consult cardiology given known multivessel CAD not amendable to intervention or CABG - personally spoke with Dr. FRANCK Langley who agreed to follow here   SL NTG PRN  Continue home meds  EKG prn chest pain

## 2022-06-30 NOTE — PLAN OF CARE
ANUJA appt scheduled for 7/12 @ 11:40AM with Dr Dwyer       06/30/22 1520   Post-Acute Status   Hospital Resources/Appts/Education Provided Appointments scheduled and added to AVS   Discharge Delays None known at this time   Discharge Plan   Discharge Plan A Home Health   Discharge Plan B Home Health

## 2022-06-30 NOTE — CONSULTS
Formerly [facility], [facility]  Department of Cardiology  Consult Note      PATIENT NAME: Marisol Kerr    MRN: [id number]  TODAY'S DATE: 06/30/2022  ADMIT DATE: 6/29/2022                          CONSULT REQUESTED BY: Elio Grant MD    SUBJECTIVE     PRINCIPAL PROBLEM: Chest pain      REASON FOR CONSULT:    From H&P: Ms. Kerr is a 77 year old female with PMHx of diet controlled DM2, HTN, HLD, CKDIII, COPD on home O2, and known multivessel CAD not a candidate for CABG who presents today with complaints of chest pain about 30 min prior to arrival. It is severe. Chest pain occurred at rest, was substernal, did not radiate, and was associated with palpitations, nausea, sweats, increased SOB, and lightheadedness. She denies fever, chills, V/D, cough, or LOC. She took 2 sprays of NTG 5 min apart without relief and called EMS. She took 2 more sprays with NTG with EMS with improvement. She is currently sleeping on my arrival in the room and chest pain free. EKG is NSR with no acute ischemic changes. Initial troponin is 0.03.      She had left heart catheterization 2/2022 severe triple-vessel disease and had complication with rectus sheet hematoma/bleeding and transferred to [facility] [place] intervention and embolization was done recently and admitted to rehab there and got discharged recently.     She was admitted in April and again transferred to [facility] for high risk PCI, but developed COPD exac and this was deferred. She states that she was told she was too high risk, they are not planning on doing an angiogram for high risk PCI, and she will be medically managed.       HPI:    Ms. Kerr is a 77 year old female who presented to the emergency room with complaints of chest discomfort this started about 30 minutes prior to arrival and was described as being still severe in the sternal region that did not radiate.  Patient also had palpitations, nausea, sweats, and shortness of breath.  Patient was given nitroglycerin by EMS and  did have some improvement.  Patient does have a history of coronary artery disease and has been managed medically due to issues with bleeding as well as a COPD exacerbation as described in the H&P.  Troponin level with a very mild elevation up to 0.049.        Review of patient's allergies indicates:   Allergen Reactions    Iodinated contrast media     Strawberries [strawberry] Hives and Itching       Past Medical History:   Diagnosis Date    CAD (coronary artery disease)     Cancer     colon    CHF (congestive heart failure)     Colitis     Colon cancer     COPD (chronic obstructive pulmonary disease)     Decreased hearing     Diabetes mellitus     Diabetes mellitus, type 2     Hypercholesterolemia     Hypertension     Hypoxemia     Insomnia     Obesity     Osteoarthritis      Past Surgical History:   Procedure Laterality Date    ANGIOGRAM, CORONARY, WITH LEFT HEART CATHETERIZATION N/A 2/10/2022    Procedure: Angiogram, Coronary, with Left Heart Cath;  Surgeon: Cristian Benjamin MD;  Location: [facility] CATH/EP LAB;  Service: Cardiology;  Laterality: N/A;    CARDIAC CATHETERIZATION      CHOLECYSTECTOMY      COLECTOMY      CORONARY ANGIOPLASTY      CORONARY STENT PLACEMENT      EXTERNAL EAR SURGERY      EYE SURGERY      FLEXIBLE SIGMOIDOSCOPY N/A 2019    Procedure: SIGMOIDOSCOPY, FLEXIBLE;  Surgeon: Biju Kramer III, MD;  Location: [facility] ENDO;  Service: Endoscopy;  Laterality: N/A;    HYSTERECTOMY      partial     Social History     Tobacco Use    Smoking status: Former Smoker     Packs/day: 3.00     Years: 50.00     Pack years: 150.00     Types: Cigarettes     Start date: 3/30/1964     Quit date: 2006     Years since quittin.3    Smokeless tobacco: Never Used    Tobacco comment: ex smoker 15 years   Substance Use Topics    Alcohol use: Yes    Drug use: No        REVIEW OF SYSTEMS  CONSTITUTIONAL: Negative for chills, fatigue and fever.   EYES: No double vision, No  blurred vision  NEURO: No headaches, No dizziness  RESPIRATORY: Negative for cough, shortness of breath and wheezing.    CARDIOVASCULAR:+ for chest pain with palpitations, shortness of breath, and nausea  GI: Negative for abdominal pain, No melena, diarrhea, nausea and vomiting.   : Negative for dysuria and frequency, Negative for hematuria  SKIN: Negative for bruising, Negative for edema or discoloration noted.   PSYCHIATRIC: Negative for depression, Negative for anxiety, Negative for memory loss  MUSCULOSKELETAL: Negative for neck pain, Negative for muscle weakness, Negative for back pain     OBJECTIVE     VITAL SIGNS (Most Recent)  Temp: 98.1 °F (36.7 °C) (06/30/22 0701)  Pulse: 77 (06/30/22 0932)  Resp: 14 (06/30/22 0806)  BP: (!) 179/66 (06/30/22 0932)  SpO2: 99 % (06/30/22 0806)    VENTILATION STATUS  Resp: 14 (06/30/22 0806)  SpO2: 99 % (06/30/22 0806)       I & O (Last 24H):    Intake/Output Summary (Last 24 hours) at 6/30/2022 1016  Last data filed at 6/30/2022 0600  Gross per 24 hour   Intake --   Output 1950 ml   Net -1950 ml       WEIGHTS  Wt Readings from Last 1 Encounters:   06/30/22 0800 82 kg (180 lb 12.4 oz)   06/30/22 0202 82 kg (180 lb 12.4 oz)   06/30/22 0134 81.9 kg (180 lb 8.9 oz)       PHYSICAL EXAM  CONSTITUTIONAL: No fever, no chills  HEENT: Normocephalic, atraumatic,pupils reactive to light                 NECK:  No JVD no carotid bruit  CVS: S1S2+, RRR  LUNGS: Clear  ABDOMEN: Soft, NT, BS+  EXTREMITIES: No cyanosis, edema  : No daniel catheter  NEURO: AAO X 3  PSY: Normal affect      HOME MEDICATIONS:  No current facility-administered medications on file prior to encounter.     Current Outpatient Medications on File Prior to Encounter   Medication Sig Dispense Refill    aspirin (ECOTRIN) 81 MG EC tablet Take 1 tablet (81 mg total) by mouth every morning. 90 tablet 3    atorvastatin (LIPITOR) 80 MG tablet Take 1 tablet (80 mg total) by mouth once daily. (Patient taking differently:  Take 80 mg by mouth every evening.) 90 tablet 1    cholestyramine (QUESTRAN) 4 gram packet Take 1 packet (4 g total) by mouth 2 (two) times daily. 180 packet 3    coenzyme Q10 100 mg capsule Take 100 mg by mouth every morning.      colchicine (COLCRYS) 0.6 mg tablet Take 1/2 tablet by mouth every other day (Patient taking differently: Take 0.6 mg by mouth every other day. Take 1/2 tablet by mouth every other day) 15 tablet 11    diltiaZEM (CARDIZEM CD) 120 MG Cp24 Take 1 capsule (120 mg total) by mouth once daily. 30 capsule 5    fish oil-omega-3 fatty acids 300-1,000 mg capsule Take 2 capsules by mouth every morning.      gabapentin (NEURONTIN) 100 MG capsule Take 1 capsule (100 mg total) by mouth every evening. 30 capsule 3    metoprolol succinate (TOPROL-XL) 50 MG 24 hr tablet Take 1 tablet (50 mg total) by mouth once daily. 45 tablet 1    pantoprazole (PROTONIX) 40 MG tablet Take 1 tablet (40 mg total) by mouth once daily. 30 tablet 5    ticagrelor (BRILINTA) 90 mg tablet Take 1 tablet (90 mg total) by mouth 2 (two) times a day. 180 tablet 1    torsemide (DEMADEX) 10 MG Tab Take 1 tablet (10 mg total) by mouth once daily. 30 tablet 5    umeclidinium (INCRUSE ELLIPTA) 62.5 mcg/actuation inhalation capsule Inhale 62.5 mcg into the lungs every morning. Controller 30 each 11    vit C/E/Zn/coppr/lutein/zeaxan (PRESERVISION AREDS-2 ORAL) Take 1 tablet by mouth once daily.      acetaminophen (TYLENOL) 325 MG tablet Take 2 tablets (650 mg total) by mouth every 4 (four) hours as needed.  0    albuterol (VENTOLIN HFA) 90 mcg/actuation inhaler Inhale 2 puffs into the lungs every 6 (six) hours as needed for Wheezing or Shortness of Breath. Rescue 36 g 3    ergocalciferol (VITAMIN D2) 50,000 unit Cap Take 1 capsule (50,000 Units total) by mouth every 7 days. (Patient taking differently: Take 50,000 Units by mouth every Monday.) 12 capsule 1    fluticasone-salmeterol diskus inhaler 250-50 mcg Inhale 1 puff  into the lungs 2 (two) times daily. 3 each 1    ipratropium-albuteroL (COMBIVENT RESPIMAT)  mcg/actuation inhaler Inhale 2 puffs into the lungs every 4 (four) hours as needed for Shortness of Breath. Rescue 12 g 3    isosorbide mononitrate (IMDUR) 60 MG 24 hr tablet Take 1 tablet (60 mg total) by mouth once daily. 30 tablet 11    [DISCONTINUED] amLODIPine (NORVASC) 10 MG tablet Take 1 tablet (10 mg total) by mouth once daily. (Patient taking differently: No sig reported) 30 tablet 11    [DISCONTINUED] benazepriL (LOTENSIN) 40 MG tablet Take 1 tablet (40 mg total) by mouth once daily. 90 tablet 1    [DISCONTINUED] cimetidine (TAGAMET) 300 MG tablet Take 1 tablet (300 mg total) by mouth every 6 (six) hours. Take 1 tablet (300 mg total) by mouth starting at 6 PM on Sunday April 17, 2022 (the evening before your angiogram procedure). Then take 1 tablet at 12 AM, then take 1 tablet at 6 AM on Monday April 18th. 3 tablet 0    [DISCONTINUED] furosemide (LASIX) 20 MG tablet Take 2 tablets (40 mg total) by mouth once daily. 45 tablet 1    [DISCONTINUED] lisinopriL 10 MG tablet Take 1 tablet (10 mg total) by mouth once daily. HOLD UNTIL OTHERWISE DIRECTED BY PRIMARY CARE PROVIDER 90 tablet 3    [DISCONTINUED] lovastatin (MEVACOR) 40 MG tablet Take 1 tablet (40 mg total) by mouth every other day. 45 tablet 3       SCHEDULED MEDS:   amLODIPine  5 mg Oral Daily    aspirin  81 mg Oral QAM    atorvastatin  80 mg Oral QHS    cholestyramine-aspartame  4 g Oral BID    colchicine  0.6 mg Oral Every other day    diltiaZEM  120 mg Oral Daily    fluticasone furoate-vilanteroL  1 puff Inhalation Daily    gabapentin  100 mg Oral QHS    isosorbide mononitrate  120 mg Oral Daily    metoprolol succinate  50 mg Oral Daily    pantoprazole  40 mg Oral Before breakfast    senna-docusate 8.6-50 mg  1 tablet Oral BID    ticagrelor  90 mg Oral BID    torsemide  10 mg Oral Daily       CONTINUOUS INFUSIONS:    PRN  MEDS:acetaminophen, albuterol sulfate, calcium chloride IVPB, calcium chloride IVPB, calcium chloride IVPB, magnesium oxide, magnesium sulfate IVPB, magnesium sulfate IVPB, magnesium sulfate IVPB, magnesium sulfate IVPB, melatonin, morphine, naloxone, nitroGLYCERIN, ondansetron, polyethylene glycol, potassium chloride in water, potassium chloride in water, potassium chloride in water, potassium chloride in water, potassium chloride, potassium chloride, potassium chloride, potassium chloride, sodium chloride 0.9%    LABS AND DIAGNOSTICS     CBC LAST 3 DAYS  Recent Labs   Lab 06/29/22 2157 06/30/22  0608   WBC 8.51 6.94   RBC 3.79* 4.15   HGB 10.6* 11.6*   HCT 34.8* 38.2   MCV 92 92   MCH 28.0 28.0   MCHC 30.5* 30.4*   RDW 14.4 14.4    211   MPV 11.4 11.2   GRAN 75.5*  6.4 69.5  4.8   LYMPH 14.6*  1.2 20.5  1.4   MONO 7.3  0.6 7.6  0.5   BASO 0.05 0.03   NRBC 0 0       COAGULATION LAST 3 DAYS  No results for input(s): LABPT, INR, APTT in the last 168 hours.    CHEMISTRY LAST 3 DAYS  Recent Labs   Lab 06/29/22 2159 06/30/22  0608    140   K 4.5 4.3    101   CO2 31* 32*   ANIONGAP 7* 7*   BUN 33* 30*   CREATININE 1.5* 1.5*   * 84   CALCIUM 9.0 9.1   MG  --  2.1   ALBUMIN 3.2*  --    PROT 6.3  --    ALKPHOS 69  --    ALT 17  --    AST 25  --    BILITOT 0.6  --        CARDIAC PROFILE LAST 3 DAYS  Recent Labs   Lab 06/29/22 2159 06/30/22  0152 06/30/22  0608   *  --   --    TROPONINI 0.030 0.036 0.049*       ENDOCRINE LAST 3 DAYS  No results for input(s): TSH, PROCAL in the last 168 hours.    LAST ARTERIAL BLOOD GAS  ABG  No results for input(s): PH, PO2, PCO2, HCO3, BE in the last 168 hours.    LAST 7 DAYS MICROBIOLOGY   Microbiology Results (last 7 days)     ** No results found for the last 168 hours. **          MOST RECENT IMAGING  X-Ray Chest AP Portable  HISTORY: Chest Pain    FINDINGS: Portable chest radiograph at 2218 hours compared to 04/07/2022 shows the cardiomediastinal  silhouette and pulmonary vasculature are within normal limits, with right subclavian central venous catheter unchanged in position.    The lungs are normally expanded, with no consolidation, large pleural effusion, or evidence of pulmonary edema. Nodular opacity in the left lower lung zone overlying the left anterior fifth rib is nonspecific. Perihilar and lower lung peribronchial cuffing is nonspecific, with no consolidation, large pleural effusion or pneumothorax.    IMPRESSION:  1. Findings suggestive of bronchitis-bronchiolitis in the perihilar regions and in both lower lungs, with no consolidated pneumonia.  2. Nonspecific left lower lung nodular opacity, potentially focal airspace disease. Consider follow-up PA and lateral chest radiograph when clinically feasible.    Electronically signed by:  Kev Salas MD  6/30/2022 6:31 AM CDT Workstation: [id number]      ECHOCARDIOGRAM RESULTS (last 5)  Results for orders placed during the hospital encounter of 04/08/22    Echo    Interpretation Summary  · Limited study for wall morgan. several off axis views.  · The estimated ejection fraction is 60%.  · The left ventricle is normal in size with normal systolic function.  · The following segments are hypokinetic: basal inferoseptal and basal inferior. All other segments are normal.  · Normal right ventricular size with normal right ventricular systolic function.      Results for orders placed during the hospital encounter of 02/11/22    Echo    Interpretation Summary  · The left ventricle is normal in size with normal systolic function.  · The estimated ejection fraction is 65%.  · There is a minor septal wall motion abnormality.  · Severe left atrial enlargement.  · Grade II left ventricular diastolic dysfunction.  · Normal right ventricular size with normal right ventricular systolic function.  · Normal central venous pressure (3 mmHg).      Results for orders placed during the hospital encounter of  02/10/22    Echo    Interpretation Summary  · The estimated PA systolic pressure is 37 mmHg.  · The left ventricle is normal in size with normal systolic function.  · The estimated ejection fraction is 60%.  · Grade II left ventricular diastolic dysfunction.  · Normal right ventricular size with normal right ventricular systolic function.  · Severe left atrial enlargement.  · Moderate right atrial enlargement.  · Mild mitral regurgitation.  · Mild tricuspid regurgitation.  · Normal central venous pressure (3 mmHg).      Results for orders placed during the hospital encounter of 09/16/19    Echo Color Flow Doppler? Yes    Interpretation Summary  · Concentric left ventricular hypertrophy.  · Normal left ventricular systolic function. The estimated ejection fraction is 65%  · Normal LV diastolic function.  · Normal right ventricular systolic function.  · Calculated RVSP is 28mmhg.      CURRENT/PREVIOUS VISIT EKG  Results for orders placed or performed during the hospital encounter of 06/29/22   EKG 12-lead    Collection Time: 06/29/22  9:36 PM    Narrative    Test Reason : R07.9,    Vent. Rate : 086 BPM     Atrial Rate : 086 BPM     P-R Int : 174 ms          QRS Dur : 096 ms      QT Int : 370 ms       P-R-T Axes : 089 074 001 degrees     QTc Int : 442 ms    Normal sinus rhythm  Normal ECG  When compared with ECG of 16-APR-2022 09:03,  ST no longer depressed in Anterior-lateral leads  T wave inversion no longer evident in Lateral leads    Referred By: [email or web address]   SELF           Confirmed By:            ASSESSMENT/PLAN:     Active Hospital Problems    Diagnosis    *Chest pain    Acute on chronic diastolic heart failure    Multiple chronic diseases    CKD (chronic kidney disease), stage III       ASSESSMENT & PLAN:     1. Chest pain  2. Acute on chronic diastolic HF  3. Multivessel CAD  4. CKD stage III   5. COPD with acute bronchitis      RECOMMENDATIONS:    1. Trend troponin levels.   2. Continue nitrates and DAPT  with aspirin 81 mg po daily and Brilinta 90 mg po BID.   Consider adding Ranexa 500 mg po BID.   3. Check CRP.   4. Further recommendations to follow. Thank you for the consultation.           Laura Stevens NP  [facility]  Department of Cardiology  Date of Service: 06/30/2022  10:16 AM    I have seen the patient, reviewed the Nurse Practitioner's history and physical, assessment, plan, progress note and consultation note. I have personally interviewed and examined the patient at bedside and agree with the findings.     1. Patient has multivessel coronary artery disease not amenable to percutaneous intervention or coronary artery bypass grafting.  2. Would maximize her medication she is currently on oral nitrates with maximize on oral nitrates Imdur as well as start her on Ranexa 500 mg p.o. b.i.d..  3. Continue her other medication including aspirin Plavix and statins.      Otis Langley MD  Department of Cardiology  [facility]

## 2022-06-30 NOTE — PLAN OF CARE
Problem: Impaired Wound Healing  Goal: Optimal Wound Healing  Outcome: Ongoing, Progressing  Intervention: Promote Wound Healing  Flowsheets (Taken 6/30/2022 1017)  Oral Nutrition Promotion: (Carlo BID) --

## 2022-06-30 NOTE — ED PROVIDER NOTES
"Encounter Date: 6/29/2022       History     Chief Complaint   Patient presents with    Chest Pain     Patient stated, "I had chest pain and hour ago and I used 2 sprays of nitro and then the pain came back 30 minutes later and I did 2 more sprays and it didn't help."     HPI     77-year-old female with a history of CAD, CHF, COPD on 3 L home O2, presents emergency department for evaluation of chest pain x1 hour.  Patient reports symptoms started acutely while she was watching TV, took a spray of nitro and symptoms resolved, pain came back approximately 30 minutes later, patient took 2 sprays of nitro and then pain resolved upon EMS transport, EMS gave patient 325 aspirin.  Patient reports pain is similar to previous heart attacks, nonradiating, midsternal, alleviated with nitro, and associated with episode of diaphoresis.  has been taking all her prescribed medications.      Review of patient's allergies indicates:   Allergen Reactions    Iodinated contrast media     Strawberries [strawberry] Hives and Itching     Past Medical History:   Diagnosis Date    CAD (coronary artery disease)     Cancer     colon    CHF (congestive heart failure)     Colitis     Colon cancer     COPD (chronic obstructive pulmonary disease)     Decreased hearing     Diabetes mellitus     Diabetes mellitus, type 2     Hypercholesterolemia     Hypertension     Hypoxemia     Insomnia     Obesity     Osteoarthritis      Past Surgical History:   Procedure Laterality Date    ANGIOGRAM, CORONARY, WITH LEFT HEART CATHETERIZATION N/A 2/10/2022    Procedure: Angiogram, Coronary, with Left Heart Cath;  Surgeon: Cristian Benjamin MD;  Location: [facility] CATH/EP LAB;  Service: Cardiology;  Laterality: N/A;    CARDIAC CATHETERIZATION      CHOLECYSTECTOMY      COLECTOMY      CORONARY ANGIOPLASTY      CORONARY STENT PLACEMENT      EXTERNAL EAR SURGERY      EYE SURGERY      FLEXIBLE SIGMOIDOSCOPY N/A 9/19/2019    Procedure: " SIGMOIDOSCOPY, FLEXIBLE;  Surgeon: Biju Kramer III, MD;  Location: [facility];  Service: Endoscopy;  Laterality: N/A;    HYSTERECTOMY      partial     Family History   Problem Relation Age of Onset    Febrile seizures Mother     Heart failure Father      Social History     Tobacco Use    Smoking status: Former Smoker     Packs/day: 3.00     Years: 50.00     Pack years: 150.00     Types: Cigarettes     Start date: 3/30/1964     Quit date: 2006     Years since quittin.3    Smokeless tobacco: Never Used    Tobacco comment: ex smoker 15 years   Substance Use Topics    Alcohol use: Yes    Drug use: No     Review of Systems   Constitutional: Positive for diaphoresis. Negative for fever.   HENT: Negative for congestion and sore throat.    Eyes: Negative for pain and visual disturbance.   Respiratory: Negative for cough and shortness of breath.    Cardiovascular: Positive for chest pain. Negative for palpitations.   Gastrointestinal: Negative for nausea and vomiting.   Genitourinary: Negative for dysuria and flank pain.   Musculoskeletal: Negative for joint swelling and myalgias.   Skin: Negative for rash and wound.   Neurological: Negative for dizziness and syncope.   Psychiatric/Behavioral: Negative for agitation and confusion.       Physical Exam     Initial Vitals [22 2142]   BP Pulse Resp Temp SpO2   (!) 167/75 91 15 97.7 °F (36.5 °C) 98 %      MAP       --         Physical Exam    Constitutional: She appears well-developed. She is not diaphoretic.   HENT:   Right Ear: External ear normal.   Left Ear: External ear normal.   Mouth/Throat: Oropharynx is clear and moist.   Eyes: EOM are normal. Pupils are equal, round, and reactive to light.   Neck: Neck supple.   Normal range of motion.  Cardiovascular: Regular rhythm and normal heart sounds.   No murmur heard.  Pulmonary/Chest: No respiratory distress. She has no wheezes. She has no rales.   Musculoskeletal:         General: Edema present.  No tenderness. Normal range of motion.      Cervical back: Normal range of motion and neck supple.     Neurological: She is alert and oriented to person, place, and time.   Skin: Skin is warm and dry. Capillary refill takes less than 2 seconds. There is erythema (Area of chronic erythema on bilateral lower extremities).   Psychiatric: She has a normal mood and affect. Her behavior is normal. Judgment and thought content normal.         ED Course   Procedures  Labs Reviewed   CBC W/ AUTO DIFFERENTIAL   COMPREHENSIVE METABOLIC PANEL   TROPONIN I   B-TYPE NATRIURETIC PEPTIDE          Imaging Results    None          Medications - No data to display  Medical Decision Making:   Initial Assessment:   77-year-old with significant cardiac history, patient with chest pain that resolved with nitro, hemodynamically stable, on home O2, no respiratory distress, vital signs otherwise within normal limits  Differential Diagnosis:   Acute coronary syndrome, MI, pneumonia, pneumothorax, COPD, CHF  ED Management:  Patient presentation.  Very consistent with cardiac etiology, initial EKG showed mild ST depression or T-wave inversions with leads 3 and AVF, no reciprocal changes, no ST elevation.  Initial troponin is negative but still detectable, and on my read of the chest x-ray, appears to be bibasilar consolidation  in the lower lung fields.  Patient consulted to medicine for high risk chest pain.    Shyam More M.D.  Emergency Medicine PGY3  11:05 PM               ED Course as of 06/29/22 2157 Wed Jun 29, 2022 2155 EKG shows normal sinus rhythm at rate 86, normal intervals, mild t-wave inversions in lead III and aVF without any reciprocal changes or ST elevations [JL]      ED Course User Index  [JL] Shyam More MD             Clinical Impression:   Final diagnoses:  [R07.9] Chest pain                 Shyam More MD  Resident  06/29/22 3376

## 2022-06-30 NOTE — PLAN OF CARE
06/30/22 0252   Patient Assessment/Suction   Level of Consciousness (AVPU) alert   Respiratory Effort Unlabored   Expansion/Accessory Muscles/Retractions expansion symmetric   All Lung Fields Breath Sounds clear   Rhythm/Pattern, Respiratory depth regular;pattern regular   Cough Frequency infrequent   Cough Type good;nonproductive   PRE-TX-O2   O2 Device (Oxygen Therapy) nasal cannula   $ Is the patient on Low Flow Oxygen? Yes   Flow (L/min) 3.5   SpO2 98 %   Pulse Oximetry Type Intermittent   $ Pulse Oximetry - Single Charge Pulse Oximetry - Single   Pulse 78   Resp 16

## 2022-06-30 NOTE — NURSING
Patient discharged per MD orders. Patient discharge instructions reviewed with patient and daughter at bedside. Copy of discharge instructions given to pt. PIV removed and tolerated well. Telemetry box removed. Home health already established prior to discharge. Home O2 present upon discharge. Patient transferred off unit via w/c to personal car.

## 2022-06-30 NOTE — PLAN OF CARE
Problem: Adult Inpatient Plan of Care  Goal: Plan of Care Review  Outcome: Ongoing, Progressing  Goal: Patient-Specific Goal (Individualized)  Outcome: Ongoing, Progressing  Goal: Absence of Hospital-Acquired Illness or Injury  Outcome: Ongoing, Progressing  Goal: Optimal Comfort and Wellbeing  Outcome: Ongoing, Progressing  Goal: Readiness for Transition of Care  Outcome: Ongoing, Progressing     Problem: Diabetes Comorbidity  Goal: Blood Glucose Level Within Targeted Range  Outcome: Ongoing, Progressing     Problem: Fluid Imbalance (Pneumonia)  Goal: Fluid Balance  Outcome: Ongoing, Progressing     Problem: Infection (Pneumonia)  Goal: Resolution of Infection Signs and Symptoms  Outcome: Ongoing, Progressing     Problem: Respiratory Compromise (Pneumonia)  Goal: Effective Oxygenation and Ventilation  Outcome: Ongoing, Progressing     Problem: Impaired Wound Healing  Goal: Optimal Wound Healing  Outcome: Ongoing, Progressing     Problem: Skin Injury Risk Increased  Goal: Skin Health and Integrity  Outcome: Ongoing, Progressing     Problem: Fall Injury Risk  Goal: Absence of Fall and Fall-Related Injury  Outcome: Ongoing, Progressing     Problem: Chest Pain  Goal: Resolution of Chest Pain Symptoms  Outcome: Ongoing, Progressing     Problem: Oral Intake Inadequate  Goal: Improved Oral Intake  Outcome: Ongoing, Progressing

## 2022-06-30 NOTE — PLAN OF CARE
Critical access hospital  Initial Discharge Assessment       Primary Care Provider: Khadar Dwyer MD    Admission Diagnosis: Chest pain [R07.9]    Admission Date: 6/29/2022  Expected Discharge Date:     Discharge Barriers Identified: (P) None    Payor: MEDICARE / Plan: MEDICARE PART A & B / Product Type: Government /     Extended Emergency Contact Information  Primary Emergency Contact: Marisol Kerr  Address: [address] [place] [address]            [place] [address] [place]  Mobile Phone: [phone]  Relation: Daughter  Preferred language: English   needed? No    Discharge Plan A: (P) Home Health  Discharge Plan B: (P) Home Health    Met with pt and introduced role. Pt stated she was recently discharged from SNF and is receiving HH for SN/PT/OT but cannot remember name of HH agency. Reviewed chart and pt assigned with [facility] . Plan to resume HH. Pt stated she also received housekeeping services twice/mth with [facility]. Pt denies any other discharge needs.      [address] [address]  Phone: [phone] Fax: [phone]      Initial Assessment (most recent)       Adult Discharge Assessment - 06/30/22 1109          Discharge Assessment    Assessment Type Discharge Planning Assessment     Confirmed/corrected address, phone number and insurance Yes (P)      Confirmed Demographics Correct on Facesheet (P)    Address:  [address] [place] [address]     Home Phone:  [phone]    Source of Information patient (P)      Does patient/caregiver understand observation status Yes (P)      Lives With alone (P)      Do you expect to return to your current living situation? Yes (P)      Do you have help at home or someone to help you manage your care at home? Yes (P)      Who are your caregiver(s) and their phone number(s)? Marisol Kerr (Daughter)   [phone] (Mobile) (P)      Prior to  hospitilization cognitive status: Alert/Oriented (P)      Current cognitive status: Alert/Oriented (P)      Walking or Climbing Stairs Difficulty ambulation difficulty, requires equipment (P)      Mobility Management Pt uses walker, and cane to assist with ambulation (P)      Dressing/Bathing Difficulty none (P)      Do you have any problems with: Errands/Grocery (P)    Pt stated her daughter assist with errands and transportation    Home Accessibility stairs to enter home (P)      Number of Stairs, Main Entrance one (P)      Equipment Currently Used at Home bedside commode;cane, straight;walker, rolling;wheelchair;oxygen (P)    Pt stated she uses O2 3.5L continuously    Readmission within 30 days? Yes (P)      Patient currently being followed by outpatient case management? No (P)      Do you currently have service(s) that help you manage your care at home? Yes (P)      How Many hours does patient receive services -- (P)    SN/PT/OT    Name and Contact number of agency [facility] -[phone] (P)      Is the pt/caregiver preference to resume services with current agency Yes (P)      Do you take prescription medications? Yes (P)      Do you have prescription coverage? Yes (P)      Coverage Medicare (P)      Do you have any problems affording any of your prescribed medications? No (P)      Is the patient taking medications as prescribed? yes (P)      Who is going to help you get home at discharge? Pt's daughter Marisol (P)      How do you get to doctors appointments? family or friend will provide (P)      Are you on dialysis? No (P)      Do you take coumadin? No (P)      Discharge Plan A Home Health (P)      Discharge Plan B Home Health (P)      DME Needed Upon Discharge  none (P)      Discharge Plan discussed with: Patient (P)      Discharge Barriers Identified None (P)

## 2022-06-30 NOTE — PLAN OF CARE
PHV appt scheduled 7/12 @ 11:40AM with Dr Dwyer.    Spoke with Jackie/[facility] and resumption of care for 7/1.    Provided nurse with community resources for MAYELA to be given to pt's daughter as requested.     Discharge orders and chart reviewed with no further post-acute discharge needs identified at this time.  At this time, patient is cleared for discharge from Case Management standpoint.        06/30/22 1728   Final Note   Assessment Type Final Discharge Note   Anticipated Discharge Disposition Home-Health   What phone number can be called within the next 1-3 days to see how you are doing after discharge? [phone]   Hospital Resources/Appts/Education Provided Appointments scheduled and added to AVS;Community resources provided   Post-Acute Status   Post-Acute Authorization Home Health   Home Health Status Set-up Complete/Auth obtained   Discharge Delays None known at this time

## 2022-06-30 NOTE — CONSULTS
"[facility]  Adult Nutrition   Consult Note (Initial Assessment)     SUMMARY     Recommendations/Interventions:    Recommendation/Intervention: 1.) Continue cardiac diet, added diabetic diet restrictions. 2.) If PO intakes <50% of meals, begin Suplena BID. 3.) Continue GI medications. 4.) Added Carlo BID for 14 days to allow for wound repair.  Goals: 1.) Pt to consume >75% of meals. 2.) Cr and GFR stablize.  Nutrition Goal Status: progressing towards goal  Communication of RD Recs: reviewed with RN    Dietitian Rounds Brief:  Pt [facility] to [facility] d/t chest pains. Pt was hospitalized from 2/10/22-4/18/22 s/p angiogram with severe triple vessel diagnosis. Prolonged hospital/rehab stay which lead to new skin breakdown of coccyx. Pt presents with +coccyx wound, unknown stage at this time. Pt is being followed by wound care outpatient. Pt laying in bed with RN at bedside. Pt reports good appetite, however c/o cold grits. She states PTA, good intake of meals. She denies chew/swallowing issues. +edentulous. She denies any GI distress.Last BM 6/29. Pt states she takes medication at home (cannot recall what medication) to aid in GI relief. She confirms +coccyx wound from previous hospital stay 2/10/22 for angiogram which required a prolonged hospital stay. She states "I was not turned enough" which caused new wound. Pt reports being followed by wound care. Bandage in place and unable to assess stage. Pt agreeable to ONS. Reviewed instructions on Carlo intake. +CKD3, Cr stable. Previous wt 78.7kg (4/2022) with 3.3kg wt gain. NFPE completed with no s/s wasting. Pt does not meet malnutrition criteria.      Malnutrition Assessment     Skin (Micronutrient): wounds unhealed           Orbital Region (Subcutaneous Fat Loss): well nourished  Upper Arm Region (Subcutaneous Fat Loss): well nourished   [place] Region (Muscle Loss): well nourished  Clavicle Bone Region (Muscle Loss): well nourished  Clavicle and Acromion Bone " Region (Muscle Loss): well nourished  Dorsal Hand (Muscle Loss): well nourished  Patellar Region (Muscle Loss): well nourished  Anterior Thigh Region (Muscle Loss): well nourished  Posterior Calf Region (Muscle Loss): well nourished   Edema (Fluid Accumulation): 1-->trace   Subcutaneous Fat Loss (Final Summary): well nourished  Muscle Loss Evaluation (Final Summary): well nourished  Fluid Accumulation Evaluation: mild         Reason for Assessment  Reason For Assessment: consult (coccyx wound)  Diagnosis: other (see comments) (chest pain)  Relevant Medical History: COPD, DM2, CAD, HTN, CHF, colitis, CKD3, hx of colon ca  Interdisciplinary Rounds: did not attend  Nutrition Discharge Plannin.) Carlo to be provided BID until 22 for propoer absorption. 2.) Low protein, DM, 2gm Na diet restrictions to preserve kidney function.    Nutrition Risk Screen  Nutrition Risk Screen: other (see comments) (coccyx wound-unstaged at this time)     MST Score: 0  Have you recently lost weight without trying?: No  Weight loss score: 0  Have you been eating poorly because of a decreased appetite?: No  Appetite score: 0       Nutrition/Diet History  Patient Reported Diet/Restrictions/Preferences: heart healthy, diabetic diet  Spiritual, Cultural Beliefs, Mosque Practices, Values that Affect Care: no  Supplemental Drinks or Food Habits: Boost Glucose Control (during hospitalization pt enjoyed Boost Glucose Control)  Food Allergies: other (see comments) (strawberries)  Factors Affecting Nutritional Intake: None identified at this time    Anthropometrics  Temp: 98.1 °F (36.7 °C)  Height: 5' (152.4 cm)  Height (inches): 60 in  Weight Method: Bed Scale  Weight: 82 kg (180 lb 12.4 oz)  Weight (lb): 180.78 lb  Ideal Body Weight (IBW), Female: 100 lb  % Ideal Body Weight, Female (lb): 180.78 %  BMI (Calculated): 35.3  BMI Grade: 35 - 39.9 - obesity - grade II  Weight Change Amount: 7 lb 4.4 oz (from 2022)       Weight History:  Wt  Readings from Last 10 Encounters:   06/30/22 82 kg (180 lb 12.4 oz)   05/02/22 78 kg (172 lb)   04/10/22 78.7 kg (173 lb 6.3 oz)   04/08/22 80 kg (176 lb 5.9 oz)   04/08/22 80 kg (176 lb 5.9 oz)   04/01/22 78.7 kg (173 lb 8 oz)   03/17/22 87.2 kg (192 lb 3.9 oz)   03/15/22 (!) 187.4 kg (413 lb 2.3 oz)   02/21/22 82.9 kg (182 lb 12.2 oz)   02/11/22 90.5 kg (199 lb 8.3 oz)   }  Lab/Procedures/Meds: Pertinent Labs Reviewed  Clinical Chemistry:  Recent Labs   Lab 06/29/22 2159 06/30/22  0608    140   K 4.5 4.3    101   CO2 31* 32*   * 84   BUN 33* 30*   CREATININE 1.5* 1.5*   CALCIUM 9.0 9.1   PROT 6.3  --    ALBUMIN 3.2*  --    BILITOT 0.6  --    ALKPHOS 69  --    AST 25  --    ALT 17  --    ANIONGAP 7* 7*   ESTGFRAFRICA 39.0* 39.0*   EGFRNONAA 33.8* 33.8*   MG  --  2.1     CBC:   Recent Labs   Lab 06/30/22  0608   WBC 6.94   RBC 4.15   HGB 11.6*   HCT 38.2      MCV 92   MCH 28.0   MCHC 30.4*     Cardiac Profile:  Recent Labs   Lab 06/29/22 2159 06/30/22  0152 06/30/22  0608   *  --   --    TROPONINI 0.030 0.036 0.049*     Medications: Pertinent Medications reviewed  Scheduled Meds:   amLODIPine  5 mg Oral Daily    aspirin  81 mg Oral QAM    atorvastatin  80 mg Oral QHS    cholestyramine-aspartame  4 g Oral BID    colchicine  0.6 mg Oral Every other day    diltiaZEM  120 mg Oral Daily    fluticasone furoate-vilanteroL  1 puff Inhalation Daily    gabapentin  100 mg Oral QHS    isosorbide mononitrate  120 mg Oral Daily    metoprolol succinate  50 mg Oral Daily    pantoprazole  40 mg Oral Before breakfast    senna-docusate 8.6-50 mg  1 tablet Oral BID    ticagrelor  90 mg Oral BID    torsemide  10 mg Oral Daily     Continuous Infusions:  PRN Meds:.acetaminophen, albuterol sulfate, calcium chloride IVPB, calcium chloride IVPB, calcium chloride IVPB, magnesium oxide, magnesium sulfate IVPB, magnesium sulfate IVPB, magnesium sulfate IVPB, magnesium sulfate IVPB, melatonin,  morphine, naloxone, nitroGLYCERIN, ondansetron, polyethylene glycol, potassium chloride in water, potassium chloride in water, potassium chloride in water, potassium chloride in water, potassium chloride, potassium chloride, potassium chloride, potassium chloride, sodium chloride 0.9%    Antibiotics (From admission, onward)            None           Estimated/Assessed Needs  Weight Used For Calorie Calculations: 82 kg (180 lb 12.4 oz)  Energy Calorie Requirements (kcal): 4150-3790 (20-25 kcal/kg)  Energy Need Method: Kcal/kg  Protein Requirements: 66-82gm (0.8-1.0)  Weight Used For Protein Calculations: 82 kg (180 lb 12.4 oz)  Fluid Requirements (mL): 3850-9835  Estimated Fluid Requirement Method: RDA Method  RDA Method (mL): 1640  CHO Requirement: 45-60gm per meal    Nutrition Prescription Ordered    Current Diet Order: cardiac    Evaluation of Received Nutrient/Fluid Intake    Energy Calories Required: meeting needs  Protein Required: meeting needs  Fluid Required: meeting needs  Tolerance: tolerating  % Intake of Estimated Energy Needs: 75 - 100 %  % Meal Intake: 75 - 100 %    Intake/Output Summary (Last 24 hours) at 6/30/2022 1016  Last data filed at 6/30/2022 0600  Gross per 24 hour   Intake --   Output 1950 ml   Net -1950 ml      Nutrition Risk    Level of Risk/Frequency of Follow-up: moderate   Monitor and Evaluation    Food and Nutrient Intake: energy intake, food and beverage intake  Food and Nutrient Adminstration: diet order  Knowledge/Beliefs/Attitudes: food and nutrition knowledge/skill  Physical Activity and Function: nutrition-related ADLs and IADLs  Anthropometric Measurements: weight, weight change  Biochemical Data, Medical Tests and Procedures: electrolyte and renal panel, gastrointestinal profile, glucose/endocrine profile, inflammatory profile, lipid profile  Nutrition-Focused Physical Findings: overall appearance     Nutrition Follow-Up    RD Follow-up?: Yes  Ronda Veras RD,  HUMBERTO  06/30/22  1016

## 2022-07-01 ENCOUNTER — PATIENT OUTREACH (OUTPATIENT)
Dept: FAMILY MEDICINE | Facility: CLINIC | Age: 77
End: 2022-07-01

## 2022-07-01 ENCOUNTER — TELEPHONE (OUTPATIENT)
Dept: FAMILY MEDICINE | Facility: CLINIC | Age: 77
End: 2022-07-01

## 2022-07-01 NOTE — DISCHARGE SUMMARY
[facility] Medicine  Discharge Summary      Patient Name: Marisol Kerr  MRN: [id number]  Patient Class: OP- Observation  Admission Date: 6/29/2022  Hospital Length of Stay: 0 days  Discharge Date and Time:  06/30/2022 7:46 PM  Attending Physician: Elio Grant MD   Discharging Provider: Elio Grant MD  Primary Care Provider: Khadar Dwyer MD      HPI:   Ms. 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 It is severe. Chest pain occurred at rest, was substernal, did not radiate, and was associated with palpitations, nausea, sweats, increased SOB, and lightheadedness. She denies fever, chills, V/D, cough, or LOC. She took 2 sprays of NTG 5 min apart without relief and called EMS. She took 2 more sprays with NTG with EMS with improvement. She is currently sleeping on my arrival in the room and chest pain free. EKG is NSR with no acute ischemic changes. Initial troponin is 0.03.      She had left heart catheterization 2/2022 severe triple-vessel disease and had complication with rectus sheet hematoma/bleeding and [place] [facility] [place] and embolization was done recently and admitted to rehab there and got discharged recently.    She was admitted in April and again transferred to [facility] for high risk PCI, but developed COPD exac and this was deferred. She states that she was told she was too high risk, they are not planning on doing an angiogram for high risk PCI, and she will be medically managed.       * No surgery found *      Hospital Course:     HPI: Ms. 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 It is  severe. Chest pain occurred at rest, was substernal, did not radiate, and was associated with palpitations, nausea, sweats, increased SOB, and lightheadedness. She denies fever, chills, V/D, cough, or LOC. She took 2 sprays of NTG 5 min apart without relief and called EMS. She took 2 more sprays with NTG with EMS with improvement. She is currently sleeping on my arrival in the room and chest pain free. EKG is NSR with no acute ischemic changes. Initial troponin is 0.03.      She had left heart catheterization 2/2022 severe triple-vessel disease and had complication with rectus sheet hematoma/bleeding and transferred to [facility] [place] [facility] [place] intervention and embolization was done recently and admitted to rehab there and got discharged recently.     She was admitted in April and again transferred to [facility] for high risk PCI, but developed COPD exac and this was deferred. She states that she was told she was too high risk, they are not planning on doing an angiogram for high risk PCI, and she will be medically managed.        Hospital course  Patient was admitted with chest pain.  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  Initial troponin negative but later  minimal increase in troponin but later back to near normal and patient no more  complained of any chest pain at all and discharged in stable condition.  Her nitrate doses increased and added Ranexa at the discharged follow-up with Cardiology at outpatient       Goals of Care Treatment Preferences:  Code Status: Full Code      Consults:   Consults (From admission, onward)        Status Ordering Provider     Inpatient consult to Registered Dietitian/Nutritionist  Once        Provider:  (Not yet assigned)    Acknowledged RICHARD HENLEY     Inpatient consult to Registered Dietitian/Nutritionist  Once        Provider:  (Not yet assigned)    Completed ALVARADO MYERS      Inpatient consult to Cardiology  Once        Provider:  Otis Langley MD    Completed HÉCTOR WILLIS          No new Assessment & Plan notes have been filed under this hospital service since the last note was generated.  Service: Hospital Medicine    Final Active Diagnoses:    Diagnosis Date Noted POA    PRINCIPAL PROBLEM:  Chest pain [R07.9] 09/17/2019 Yes    Acute on chronic diastolic heart failure [I50.33] 06/30/2022 Yes    Multiple chronic diseases [R69] 06/30/2022 Yes    CKD (chronic kidney disease), stage III [N18.30] 10/08/2014 Yes      Problems Resolved During this Admission:       Discharged Condition: good    Disposition: Home or Self Care    Follow Up:   Follow-up Information     Khadar Dwyer MD. Go on 7/12/2022.    Specialties: Family Medicine, Home Health Services, Hospice Services  Why: PHV appt scheduled for 7/12 @ 11:40AM with Dr Dwyer  Contact information:  [address]  [address]  [facility]  [place] [address]  [phone]             Otis Langley MD Follow up in 1 month(s).    Specialties: Interventional Cardiology, Cardiology, Cardiovascular Disease  Contact information:  [address]  [address]  [place] [address]  [phone]                       Patient Instructions:      Diet Cardiac     Activity as tolerated       Significant Diagnostic Studies: Labs:   CMP   Recent Labs   Lab 06/29/22 2159 06/30/22  0608    140   K 4.5 4.3    101   CO2 31* 32*   * 84   BUN 33* 30*   CREATININE 1.5* 1.5*   CALCIUM 9.0 9.1   PROT 6.3  --    ALBUMIN 3.2*  --    BILITOT 0.6  --    ALKPHOS 69  --    AST 25  --    ALT 17  --    ANIONGAP 7* 7*   ESTGFRAFRICA 39.0* 39.0*   EGFRNONAA 33.8* 33.8*    and CBC   Recent Labs   Lab 06/29/22 2157 06/30/22  0608   WBC 8.51 6.94   HGB 10.6* 11.6*   HCT 34.8* 38.2    211       Pending Diagnostic Studies:     None         Medications:  Reconciled Home Medications:      Medication List      START taking these  medications    ranolazine 500 MG Tb12  Commonly known as: RANEXA  Take 1 tablet (500 mg total) by mouth 2 (two) times daily.        CHANGE how you take these medications    atorvastatin 80 MG tablet  Commonly known as: LIPITOR  Take 1 tablet (80 mg total) by mouth once daily.  What changed: when to take this     colchicine 0.6 mg tablet  Commonly known as: COLCRYS  Take 1/2 tablet by mouth every other day  What changed:   · how much to take  · how to take this  · when to take this     ergocalciferol 50,000 unit Cap  Commonly known as: VITAMIN D2  Take 1 capsule (50,000 Units total) by mouth every 7 days.  What changed: when to take this     isosorbide mononitrate 120 MG 24 hr tablet  Commonly known as: IMDUR  Take 1 tablet (120 mg total) by mouth once daily.  Start taking on: July 1, 2022  What changed:   · medication strength  · how much to take        CONTINUE taking these medications    acetaminophen 325 MG tablet  Commonly known as: TYLENOL  Take 2 tablets (650 mg total) by mouth every 4 (four) hours as needed.     albuterol 90 mcg/actuation inhaler  Commonly known as: VENTOLIN HFA  Inhale 2 puffs into the lungs every 6 (six) hours as needed for Wheezing or Shortness of Breath. Rescue     aspirin 81 MG EC tablet  Commonly known as: ECOTRIN  Take 1 tablet (81 mg total) by mouth every morning.     cholestyramine 4 gram packet  Commonly known as: QUESTRAN  Take 1 packet (4 g total) by mouth 2 (two) times daily.     coenzyme Q10 100 mg capsule  Take 100 mg by mouth every morning.     CombiVENT RESPIMAT  mcg/actuation inhaler  Generic drug: ipratropium-albuteroL  Inhale 2 puffs into the lungs every 4 (four) hours as needed for Shortness of Breath. Rescue     diltiaZEM 120 MG Cp24  Commonly known as: CARDIZEM CD  Take 1 capsule (120 mg total) by mouth once daily.     fish oil-omega-3 fatty acids 300-1,000 mg capsule  Take 2 capsules by mouth every morning.     fluticasone-salmeterol 250-50 mcg/dose 250-50  mcg/dose diskus inhaler  Commonly known as: ADVAIR DISKUS  Inhale 1 puff into the lungs 2 (two) times daily.     gabapentin 100 MG capsule  Commonly known as: NEURONTIN  Take 1 capsule (100 mg total) by mouth every evening.     INCRUSE ELLIPTA 62.5 mcg/actuation inhalation capsule  Generic drug: umeclidinium  Inhale 62.5 mcg into the lungs every morning. Controller     metoprolol succinate 50 MG 24 hr tablet  Commonly known as: TOPROL-XL  Take 1 tablet (50 mg total) by mouth once daily.     pantoprazole 40 MG tablet  Commonly known as: PROTONIX  Take 1 tablet (40 mg total) by mouth once daily.     PRESERVISION AREDS-2 ORAL  Take 1 tablet by mouth once daily.     torsemide 10 MG Tab  Commonly known as: DEMADEX  Take 1 tablet (10 mg total) by mouth once daily.        STOP taking these medications    amLODIPine 10 MG tablet  Commonly known as: NORVASC     benazepriL 40 MG tablet  Commonly known as: LOTENSIN     cimetidine 300 MG tablet  Commonly known as: TAGAMET     furosemide 20 MG tablet  Commonly known as: LASIX     lisinopriL 10 MG tablet     lovastatin 40 MG tablet  Commonly known as: MEVACOR     ticagrelor 90 mg tablet  Commonly known as: BRILINTA            Indwelling Lines/Drains at time of discharge:   Lines/Drains/Airways     None               General: Patient resting comfortably in no acute distress. Appears as stated age. Calm  Eyes: EOM intact. No conjunctivae injection. No scleral icterus.  ENT: Hearing grossly intact. No discharge from ears. No nasal discharge.   CVS: RRR. No LE edema BL.  Lungs: CTA BL, no wheezing or crackles. Good breath sounds. No accessory muscle use. No acute respiratory distress  Neuro: non focal , Follows commands. Responds appropriately  Time spent on the discharge of patient: 25 minutes         Elio Grant MD  Department of Hospital Medicine  [facility]

## 2022-07-01 NOTE — PROGRESS NOTES
Discharge Information     Discharge Date:   6/30/22    Primary Discharge Diagnosis:  Chest pain    Discharge Summary:  Reviewed      Medication & Order Review     Were medication changes made or new medications added?   Yes    If so, has the patient filled the prescriptions?  Yes     Was Home Health ordered? Yes    If so, has Home Health contacted patient and/or initiated services?  Yes    Name of Home Health Agency? Patient already has HH, they are going to resume care    Durable Medical Equipment ordered?  No     If so, has the DME provider contacted patient and delivered equipment?  na    Follow Up               Any problems since discharge? No    How is the patient feeling since returning home?      Have you set up recommended follow up appointments?  (cardiology, surgery, etc.)    Schedule Hospital Follow-up appointment within 7-14 days (preferably 7).      Notes:  Spoke with pt who states they did make a lot of changes to her meds, she is going to bring the bottles to the visit, states she is not having any trouble since being home, just tired.             [place]

## 2022-07-01 NOTE — HOSPITAL COURSE
HPI: Ms. 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 It is severe. Chest pain occurred at rest, was substernal, did not radiate, and was associated with palpitations, nausea, sweats, increased SOB, and lightheadedness. She denies fever, chills, V/D, cough, or LOC. She took 2 sprays of NTG 5 min apart without relief and called EMS. She took 2 more sprays with NTG with EMS with improvement. She is currently sleeping on my arrival in the room and chest pain free. EKG is NSR with no acute ischemic changes. Initial troponin is 0.03.      She had left heart catheterization 2/2022 severe triple-vessel disease and had complication with rectus sheet hematoma/bleeding and transferred to [facility] for IR intervention and embolization was done recently and admitted to rehab there and got discharged recently.     She was admitted in April and again transferred to [facility] for high risk PCI, but developed COPD exac and this was deferred. She states that she was told she was too high risk, they are not planning on doing an angiogram for high risk PCI, and she will be medically managed.        Hospital course  Patient was admitted with chest pain.  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  Initial troponin negative but later  minimal increase in troponin but later back to near normal and patient no more  complained of any chest pain at all and discharged in stable condition.  Her nitrate doses increased and added Ranexa at the discharged follow-up with Cardiology at outpatient

## 2022-07-05 ENCOUNTER — TELEPHONE (OUTPATIENT)
Dept: FAMILY MEDICINE | Facility: CLINIC | Age: 77
End: 2022-07-05

## 2022-07-05 NOTE — TELEPHONE ENCOUNTER
----- Message from Ronda Winter sent at 7/5/2022  7:07 AM CDT -----  Vm- [facility] is calling about a drug interaction cardizem  [phone]

## 2022-07-12 ENCOUNTER — OFFICE VISIT (OUTPATIENT)
Dept: FAMILY MEDICINE | Facility: CLINIC | Age: 77
End: 2022-07-12
Payer: MEDICARE

## 2022-07-12 DIAGNOSIS — E78.00 PURE HYPERCHOLESTEROLEMIA: ICD-10-CM

## 2022-07-12 DIAGNOSIS — I25.118 CORONARY ARTERY DISEASE OF NATIVE ARTERY OF NATIVE HEART WITH STABLE ANGINA PECTORIS: Primary | ICD-10-CM

## 2022-07-12 DIAGNOSIS — I25.110 ATHEROSCLEROSIS OF NATIVE CORONARY ARTERY OF NATIVE HEART WITH UNSTABLE ANGINA PECTORIS: ICD-10-CM

## 2022-07-12 DIAGNOSIS — I87.8 VENOUS STASIS: Chronic | ICD-10-CM

## 2022-07-12 DIAGNOSIS — Z09 HOSPITAL DISCHARGE FOLLOW-UP: ICD-10-CM

## 2022-07-12 DIAGNOSIS — N18.32 STAGE 3B CHRONIC KIDNEY DISEASE: ICD-10-CM

## 2022-07-12 DIAGNOSIS — E11.8 DM TYPE 2, CONTROLLED, WITH COMPLICATION: ICD-10-CM

## 2022-07-12 DIAGNOSIS — I10 ESSENTIAL HYPERTENSION, BENIGN: ICD-10-CM

## 2022-07-12 DIAGNOSIS — I83.009 VARICOSE VEINS OF UNSPECIFIED LOWER EXTREMITY WITH ULCER OF UNSPECIFIED SITE (CODE): ICD-10-CM

## 2022-07-12 DIAGNOSIS — N20.0 NEPHROLITHIASIS: Chronic | ICD-10-CM

## 2022-07-12 DIAGNOSIS — J44.9 CHRONIC OBSTRUCTIVE PULMONARY DISEASE, UNSPECIFIED COPD TYPE: Chronic | ICD-10-CM

## 2022-07-12 DIAGNOSIS — I20.0 UNSTABLE ANGINA: ICD-10-CM

## 2022-07-12 PROCEDURE — 99495 TRANSJ CARE MGMT MOD F2F 14D: CPT | Mod: S$GLB,,, | Performed by: FAMILY MEDICINE

## 2022-07-12 PROCEDURE — 99495 TCM SERVICES (MODERATE COMPLEXITY): ICD-10-PCS | Mod: S$GLB,,, | Performed by: FAMILY MEDICINE

## 2022-07-12 RX ORDER — METOPROLOL SUCCINATE 50 MG/1
50 TABLET, EXTENDED RELEASE ORAL DAILY
Qty: 90 TABLET | Refills: 1 | Status: SHIPPED | OUTPATIENT
Start: 2022-07-12 | End: 2022-12-21 | Stop reason: SDUPTHER

## 2022-07-12 RX ORDER — RANOLAZINE 500 MG/1
500 TABLET, EXTENDED RELEASE ORAL 2 TIMES DAILY
Qty: 180 TABLET | Refills: 1 | Status: SHIPPED | OUTPATIENT
Start: 2022-07-12 | End: 2022-08-08 | Stop reason: SDUPTHER

## 2022-07-12 RX ORDER — ISOSORBIDE MONONITRATE 120 MG/1
120 TABLET, EXTENDED RELEASE ORAL DAILY
Qty: 90 TABLET | Refills: 1 | Status: SHIPPED | OUTPATIENT
Start: 2022-07-12 | End: 2022-07-12

## 2022-07-12 RX ORDER — ISOSORBIDE MONONITRATE 120 MG/1
120 TABLET, EXTENDED RELEASE ORAL DAILY
Qty: 90 TABLET | Refills: 1 | Status: SHIPPED | OUTPATIENT
Start: 2022-07-12 | End: 2022-10-04 | Stop reason: SDUPTHER

## 2022-07-16 PROBLEM — I20.0 UNSTABLE ANGINA: Status: ACTIVE | Noted: 2022-07-16

## 2022-07-16 PROBLEM — I83.009 VARICOSE VEINS OF UNSPECIFIED LOWER EXTREMITY WITH ULCER OF UNSPECIFIED SITE (CODE): Status: ACTIVE | Noted: 2022-07-16

## 2022-07-16 NOTE — PROGRESS NOTES
SUBJECTIVE:    Patient ID: Marisol Kerr is a 77 y.o. female.    Chief Complaint: Hospital Follow Up (Brought med list // hospital  stop 7 medications // [facility])     77-year-old female had a recent hospitalization is here for hospital follow-up.  She was admitted with chest pain.  She had minimal increase in troponin levels.  She is not a candidate for CABG.     Hospital course  Patient was admitted with chest pain.  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  Initial troponin negative but later  minimal increase in troponin but later back to near normal and patient no more  complained of any chest pain at all and discharged in stable condition.  Her nitrate doses increased and added Ranexa at the discharged follow-up with Cardiology at outpatient     In now on Ranexa 500 mg b.i.d. isosorbide 120 mg daily.  She is oxygen dependent and wheelchair dependent.  Her family cares for and visits daily.  She walks with 2 canes or Rollator.    Home health nursing continues to follow were for a sacral ulcer that is been there for months.  It is improving greatly and is almost healed.      Admit Date: 6/29/22   Discharge Date: 6/30/22  Discharge Facility: Hospital    Medication Reconciliation:  Medications changed/added/deleted.  Ranexa and isosorbide added  New Prescriptions filled after discharge: yes  Discharge summary reviewed:  yes  Pending test results at discharge reviewed:   no  Follow up appointments scheduled:  yes              with Cardiology   Follow up labs/tests ordered:   no  Home Health ordered on discharge:   yes  Home Health [facility] name[facility]  DME ordered at discharge:   not applicable  How patient is feeling since discharge from the hospital?  patient is no longer having chest pain.       Patient follow up phone call documented on separate encounter.      Admission on 06/29/2022, Discharged on  06/30/2022   Component Date Value Ref Range Status    WBC 06/29/2022 8.51  3.90 - 12.70 K/uL Final    RBC 06/29/2022 3.79 (A) 4.00 - 5.40 M/uL Final    Hemoglobin 06/29/2022 10.6 (A) 12.0 - 16.0 g/dL Final    Hematocrit 06/29/2022 34.8 (A) 37.0 - 48.5 % Final    MCV 06/29/2022 92  82 - 98 fL Final    MCH 06/29/2022 28.0  27.0 - 31.0 pg Final    MCHC 06/29/2022 30.5 (A) 32.0 - 36.0 g/dL Final    RDW 06/29/2022 14.4  11.5 - 14.5 % Final    Platelets 06/29/2022 209  150 - 450 K/uL Final    MPV 06/29/2022 11.4  9.2 - 12.9 fL Final    Immature Granulocytes 06/29/2022 0.4  0.0 - 0.5 % Final    Gran # (ANC) 06/29/2022 6.4  1.8 - 7.7 K/uL Final    Immature Grans (Abs) 06/29/2022 0.03  0.00 - 0.04 K/uL Final    Lymph # 06/29/2022 1.2  1.0 - 4.8 K/uL Final    Mono # 06/29/2022 0.6  0.3 - 1.0 K/uL Final    Eos # 06/29/2022 0.1  0.0 - 0.5 K/uL Final    Baso # 06/29/2022 0.05  0.00 - 0.20 K/uL Final    nRBC 06/29/2022 0  0 /100 WBC Final    Gran % 06/29/2022 75.5 (A) 38.0 - 73.0 % Final    Lymph % 06/29/2022 14.6 (A) 18.0 - 48.0 % Final    Mono % 06/29/2022 7.3  4.0 - 15.0 % Final    Eosinophil % 06/29/2022 1.6  0.0 - 8.0 % Final    Basophil % 06/29/2022 0.6  0.0 - 1.9 % Final    Differential Method 06/29/2022 Automated   Final    Sodium 06/29/2022 141  136 - 145 mmol/L Final    Potassium 06/29/2022 4.5  3.5 - 5.1 mmol/L Final    Chloride 06/29/2022 103  95 - 110 mmol/L Final    CO2 06/29/2022 31 (A) 23 - 29 mmol/L Final    Glucose 06/29/2022 126 (A) 70 - 110 mg/dL Final    BUN 06/29/2022 33 (A) 8 - 23 mg/dL Final    Creatinine 06/29/2022 1.5 (A) 0.5 - 1.4 mg/dL Final    Calcium 06/29/2022 9.0  8.7 - 10.5 mg/dL Final    Total Protein 06/29/2022 6.3  6.0 - 8.4 g/dL Final    Albumin 06/29/2022 3.2 (A) 3.5 - 5.2 g/dL Final    Total Bilirubin 06/29/2022 0.6  0.1 - 1.0 mg/dL Final    Alkaline Phosphatase 06/29/2022 69  55 - 135 U/L Final    AST 06/29/2022 25  10 - 40 U/L Final    ALT 06/29/2022  17  10 - 44 U/L Final    Anion Gap 06/29/2022 7 (A) 8 - 16 mmol/L Final    eGFR if  06/29/2022 39.0 (A) >60 mL/min/1.73 m^2 Final    eGFR if non  06/29/2022 33.8 (A) >60 mL/min/1.73 m^2 Final    Troponin I 06/29/2022 0.030  <=0.040 ng/mL Final    BNP 06/29/2022 234 (A) 0 - 99 pg/mL Final    Troponin I 06/30/2022 0.036  <=0.040 ng/mL Final    SARS-CoV-2 RNA, Amplification, Qual 06/29/2022 Negative  Negative Final    Sodium 06/30/2022 140  136 - 145 mmol/L Final    Potassium 06/30/2022 4.3  3.5 - 5.1 mmol/L Final    Chloride 06/30/2022 101  95 - 110 mmol/L Final    CO2 06/30/2022 32 (A) 23 - 29 mmol/L Final    Glucose 06/30/2022 84  70 - 110 mg/dL Final    BUN 06/30/2022 30 (A) 8 - 23 mg/dL Final    Creatinine 06/30/2022 1.5 (A) 0.5 - 1.4 mg/dL Final    Calcium 06/30/2022 9.1  8.7 - 10.5 mg/dL Final    Anion Gap 06/30/2022 7 (A) 8 - 16 mmol/L Final    eGFR if  06/30/2022 39.0 (A) >60 mL/min/1.73 m^2 Final    eGFR if non  06/30/2022 33.8 (A) >60 mL/min/1.73 m^2 Final    Magnesium 06/30/2022 2.1  1.6 - 2.6 mg/dL Final    Troponin I 06/30/2022 0.049 (A) <=0.040 ng/mL Final    WBC 06/30/2022 6.94  3.90 - 12.70 K/uL Final    RBC 06/30/2022 4.15  4.00 - 5.40 M/uL Final    Hemoglobin 06/30/2022 11.6 (A) 12.0 - 16.0 g/dL Final    Hematocrit 06/30/2022 38.2  37.0 - 48.5 % Final    MCV 06/30/2022 92  82 - 98 fL Final    MCH 06/30/2022 28.0  27.0 - 31.0 pg Final    MCHC 06/30/2022 30.4 (A) 32.0 - 36.0 g/dL Final    RDW 06/30/2022 14.4  11.5 - 14.5 % Final    Platelets 06/30/2022 211  150 - 450 K/uL Final    MPV 06/30/2022 11.2  9.2 - 12.9 fL Final    Immature Granulocytes 06/30/2022 0.3  0.0 - 0.5 % Final    Gran # (ANC) 06/30/2022 4.8  1.8 - 7.7 K/uL Final    Immature Grans (Abs) 06/30/2022 0.02  0.00 - 0.04 K/uL Final    Lymph # 06/30/2022 1.4  1.0 - 4.8 K/uL Final    Mono # 06/30/2022 0.5  0.3 - 1.0 K/uL Final    Eos #  06/30/2022 0.1  0.0 - 0.5 K/uL Final    Baso # 06/30/2022 0.03  0.00 - 0.20 K/uL Final    nRBC 06/30/2022 0  0 /100 WBC Final    Gran % 06/30/2022 69.5  38.0 - 73.0 % Final    Lymph % 06/30/2022 20.5  18.0 - 48.0 % Final    Mono % 06/30/2022 7.6  4.0 - 15.0 % Final    Eosinophil % 06/30/2022 1.7  0.0 - 8.0 % Final    Basophil % 06/30/2022 0.4  0.0 - 1.9 % Final    Differential Method 06/30/2022 Automated   Final    Troponin I 06/30/2022 0.044 (A) <=0.040 ng/mL Final    CRP 06/30/2022 0.09  <0.76 mg/dL Final   Lab Visit on 05/17/2022   Component Date Value Ref Range Status    WBC 05/17/2022 8.22  3.90 - 12.70 K/uL Final    RBC 05/17/2022 4.02  4.00 - 5.40 M/uL Final    Hemoglobin 05/17/2022 11.4 (A) 12.0 - 16.0 g/dL Final    Hematocrit 05/17/2022 37.9  37.0 - 48.5 % Final    MCV 05/17/2022 94  82 - 98 fL Final    MCH 05/17/2022 28.4  27.0 - 31.0 pg Final    MCHC 05/17/2022 30.1 (A) 32.0 - 36.0 g/dL Final    RDW 05/17/2022 13.3  11.5 - 14.5 % Final    Platelets 05/17/2022 188  150 - 450 K/uL Final    MPV 05/17/2022 12.2  9.2 - 12.9 fL Final    Immature Granulocytes 05/17/2022 0.4  0.0 - 0.5 % Final    Gran # (ANC) 05/17/2022 6.4  1.8 - 7.7 K/uL Final    Immature Grans (Abs) 05/17/2022 0.03  0.00 - 0.04 K/uL Final    Lymph # 05/17/2022 0.9 (A) 1.0 - 4.8 K/uL Final    Mono # 05/17/2022 0.7  0.3 - 1.0 K/uL Final    Eos # 05/17/2022 0.2  0.0 - 0.5 K/uL Final    Baso # 05/17/2022 0.03  0.00 - 0.20 K/uL Final    nRBC 05/17/2022 0  0 /100 WBC Final    Gran % 05/17/2022 77.2 (A) 38.0 - 73.0 % Final    Lymph % 05/17/2022 11.3 (A) 18.0 - 48.0 % Final    Mono % 05/17/2022 8.5  4.0 - 15.0 % Final    Eosinophil % 05/17/2022 2.2  0.0 - 8.0 % Final    Basophil % 05/17/2022 0.4  0.0 - 1.9 % Final    Differential Method 05/17/2022 Automated   Final    Sodium 05/17/2022 142  136 - 145 mmol/L Final    Potassium 05/17/2022 4.8  3.5 - 5.1 mmol/L Final    Chloride 05/17/2022 100  95 - 110 mmol/L Final     CO2 05/17/2022 30 (A) 23 - 29 mmol/L Final    Glucose 05/17/2022 103  70 - 110 mg/dL Final    BUN 05/17/2022 26 (A) 8 - 23 mg/dL Final    Creatinine 05/17/2022 1.5 (A) 0.5 - 1.4 mg/dL Final    Calcium 05/17/2022 8.9  8.7 - 10.5 mg/dL Final    Total Protein 05/17/2022 6.0  6.0 - 8.4 g/dL Final    Albumin 05/17/2022 3.0 (A) 3.5 - 5.2 g/dL Final    Total Bilirubin 05/17/2022 0.4  0.1 - 1.0 mg/dL Final    Alkaline Phosphatase 05/17/2022 86  55 - 135 U/L Final    AST 05/17/2022 14  10 - 40 U/L Final    ALT 05/17/2022 9 (A) 10 - 44 U/L Final    Anion Gap 05/17/2022 12  8 - 16 mmol/L Final    eGFR if  05/17/2022 39.3 (A) >60 mL/min/1.73 m^2 Final    eGFR if non  05/17/2022 34.1 (A) >60 mL/min/1.73 m^2 Final    BNP 05/17/2022 111 (A) 0 - 99 pg/mL Final   No results displayed because visit has over 200 results.      Admission on 04/07/2022, Discharged on 04/08/2022   Component Date Value Ref Range Status    WBC 04/07/2022 8.33  3.90 - 12.70 K/uL Final    RBC 04/07/2022 3.37 (A) 4.00 - 5.40 M/uL Final    Hemoglobin 04/07/2022 9.7 (A) 12.0 - 16.0 g/dL Final    Hematocrit 04/07/2022 31.4 (A) 37.0 - 48.5 % Final    MCV 04/07/2022 93  82 - 98 fL Final    MCH 04/07/2022 28.8  27.0 - 31.0 pg Final    MCHC 04/07/2022 30.9 (A) 32.0 - 36.0 g/dL Final    RDW 04/07/2022 14.0  11.5 - 14.5 % Final    Platelets 04/07/2022 160  150 - 450 K/uL Final    MPV 04/07/2022 11.3  9.2 - 12.9 fL Final    Immature Granulocytes 04/07/2022 0.4  0.0 - 0.5 % Final    Gran # (ANC) 04/07/2022 6.9  1.8 - 7.7 K/uL Final    Immature Grans (Abs) 04/07/2022 0.03  0.00 - 0.04 K/uL Final    Lymph # 04/07/2022 0.8 (A) 1.0 - 4.8 K/uL Final    Mono # 04/07/2022 0.5  0.3 - 1.0 K/uL Final    Eos # 04/07/2022 0.1  0.0 - 0.5 K/uL Final    Baso # 04/07/2022 0.03  0.00 - 0.20 K/uL Final    nRBC 04/07/2022 0  0 /100 WBC Final    Gran % 04/07/2022 82.8 (A) 38.0 - 73.0 % Final    Lymph % 04/07/2022 9.4 (A)  18.0 - 48.0 % Final    Mono % 04/07/2022 5.8  4.0 - 15.0 % Final    Eosinophil % 04/07/2022 1.2  0.0 - 8.0 % Final    Basophil % 04/07/2022 0.4  0.0 - 1.9 % Final    Differential Method 04/07/2022 Automated   Final    Sodium 04/07/2022 140  136 - 145 mmol/L Final    Potassium 04/07/2022 4.9  3.5 - 5.1 mmol/L Final    Chloride 04/07/2022 106  95 - 110 mmol/L Final    CO2 04/07/2022 27  23 - 29 mmol/L Final    Glucose 04/07/2022 121 (A) 70 - 110 mg/dL Final    BUN 04/07/2022 33 (A) 8 - 23 mg/dL Final    Creatinine 04/07/2022 1.3  0.5 - 1.4 mg/dL Final    Calcium 04/07/2022 8.9  8.7 - 10.5 mg/dL Final    Total Protein 04/07/2022 6.3  6.0 - 8.4 g/dL Final    Albumin 04/07/2022 3.3 (A) 3.5 - 5.2 g/dL Final    Total Bilirubin 04/07/2022 0.7  0.1 - 1.0 mg/dL Final    Alkaline Phosphatase 04/07/2022 73  55 - 135 U/L Final    AST 04/07/2022 17  10 - 40 U/L Final    ALT 04/07/2022 16  10 - 44 U/L Final    Anion Gap 04/07/2022 7 (A) 8 - 16 mmol/L Final    eGFR if  04/07/2022 46.7 (A) >60 mL/min/1.73 m^2 Final    eGFR if non African American 04/07/2022 40.5 (A) >60 mL/min/1.73 m^2 Final    BNP 04/07/2022 219 (A) 0 - 99 pg/mL Final    Troponin I 04/07/2022 0.058 (A) <=0.040 ng/mL Final    PT 04/07/2022 13.3  11.4 - 13.7 sec Final    INR 04/07/2022 1.1   Final    SARS-CoV-2 RNA, Amplification, Qual 04/07/2022 Negative  Negative Final    Troponin I 04/07/2022 0.066 (A) <=0.040 ng/mL Final    WBC 04/08/2022 8.20  3.90 - 12.70 K/uL Final    RBC 04/08/2022 3.43 (A) 4.00 - 5.40 M/uL Final    Hemoglobin 04/08/2022 9.8 (A) 12.0 - 16.0 g/dL Final    Hematocrit 04/08/2022 32.9 (A) 37.0 - 48.5 % Final    MCV 04/08/2022 96  82 - 98 fL Final    MCH 04/08/2022 28.6  27.0 - 31.0 pg Final    MCHC 04/08/2022 29.8 (A) 32.0 - 36.0 g/dL Final    RDW 04/08/2022 13.6  11.5 - 14.5 % Final    Platelets 04/08/2022 148 (A) 150 - 450 K/uL Final    MPV 04/08/2022 11.6  9.2 - 12.9 fL Final    Immature  Granulocytes 04/08/2022 0.4  0.0 - 0.5 % Final    Gran # (ANC) 04/08/2022 6.4  1.8 - 7.7 K/uL Final    Immature Grans (Abs) 04/08/2022 0.03  0.00 - 0.04 K/uL Final    Lymph # 04/08/2022 1.1  1.0 - 4.8 K/uL Final    Mono # 04/08/2022 0.5  0.3 - 1.0 K/uL Final    Eos # 04/08/2022 0.2  0.0 - 0.5 K/uL Final    Baso # 04/08/2022 0.03  0.00 - 0.20 K/uL Final    nRBC 04/08/2022 0  0 /100 WBC Final    Gran % 04/08/2022 77.3 (A) 38.0 - 73.0 % Final    Lymph % 04/08/2022 13.3 (A) 18.0 - 48.0 % Final    Mono % 04/08/2022 6.6  4.0 - 15.0 % Final    Eosinophil % 04/08/2022 2.0  0.0 - 8.0 % Final    Basophil % 04/08/2022 0.4  0.0 - 1.9 % Final    Differential Method 04/08/2022 Automated   Final    Sodium 04/08/2022 144  136 - 145 mmol/L Final    Potassium 04/08/2022 4.5  3.5 - 5.1 mmol/L Final    Chloride 04/08/2022 109  95 - 110 mmol/L Final    CO2 04/08/2022 24  23 - 29 mmol/L Final    Glucose 04/08/2022 88  70 - 110 mg/dL Final    BUN 04/08/2022 28 (A) 8 - 23 mg/dL Final    Creatinine 04/08/2022 1.3  0.5 - 1.4 mg/dL Final    Calcium 04/08/2022 9.2  8.7 - 10.5 mg/dL Final    Anion Gap 04/08/2022 11  8 - 16 mmol/L Final    eGFR if  04/08/2022 46.7 (A) >60 mL/min/1.73 m^2 Final    eGFR if non African American 04/08/2022 40.5 (A) >60 mL/min/1.73 m^2 Final   Admission on 03/18/2022, Discharged on 04/01/2022   Component Date Value Ref Range Status    POCT Glucose 03/18/2022 218 (A) 70 - 110 mg/dL Final    POCT Glucose 03/19/2022 112 (A) 70 - 110 mg/dL Final    POCT Glucose 03/19/2022 125 (A) 70 - 110 mg/dL Final    POCT Glucose 03/19/2022 112 (A) 70 - 110 mg/dL Final    POCT Glucose 03/19/2022 122 (A) 70 - 110 mg/dL Final    POCT Glucose 03/20/2022 108  70 - 110 mg/dL Final    POCT Glucose 03/20/2022 102  70 - 110 mg/dL Final    POCT Glucose 03/20/2022 103  70 - 110 mg/dL Final    Sodium 03/21/2022 144  136 - 145 mmol/L Final    Potassium 03/21/2022 3.8  3.5 - 5.1 mmol/L Final     Chloride 03/21/2022 107  95 - 110 mmol/L Final    CO2 03/21/2022 27  23 - 29 mmol/L Final    Glucose 03/21/2022 84  70 - 110 mg/dL Final    BUN 03/21/2022 72 (A) 8 - 23 mg/dL Final    Creatinine 03/21/2022 1.9 (A) 0.5 - 1.4 mg/dL Final    Calcium 03/21/2022 8.9  8.7 - 10.5 mg/dL Final    Anion Gap 03/21/2022 10  8 - 16 mmol/L Final    eGFR if  03/21/2022 29.5 (A) >60 mL/min/1.73 m^2 Final    eGFR if non African American 03/21/2022 25.6 (A) >60 mL/min/1.73 m^2 Final    Magnesium 03/21/2022 1.5 (A) 1.6 - 2.6 mg/dL Final    Phosphorus 03/21/2022 4.0  2.7 - 4.5 mg/dL Final    WBC 03/21/2022 6.08  3.90 - 12.70 K/uL Final    RBC 03/21/2022 3.20 (A) 4.00 - 5.40 M/uL Final    Hemoglobin 03/21/2022 9.2 (A) 12.0 - 16.0 g/dL Final    Hematocrit 03/21/2022 30.1 (A) 37.0 - 48.5 % Final    MCV 03/21/2022 94  82 - 98 fL Final    MCH 03/21/2022 28.8  27.0 - 31.0 pg Final    MCHC 03/21/2022 30.6 (A) 32.0 - 36.0 g/dL Final    RDW 03/21/2022 14.3  11.5 - 14.5 % Final    Platelets 03/21/2022 155  150 - 450 K/uL Final    MPV 03/21/2022 12.4  9.2 - 12.9 fL Final    Immature Granulocytes 03/21/2022 0.5  0.0 - 0.5 % Final    Gran # (ANC) 03/21/2022 4.1  1.8 - 7.7 K/uL Final    Immature Grans (Abs) 03/21/2022 0.03  0.00 - 0.04 K/uL Final    Lymph # 03/21/2022 1.3  1.0 - 4.8 K/uL Final    Mono # 03/21/2022 0.5  0.3 - 1.0 K/uL Final    Eos # 03/21/2022 0.2  0.0 - 0.5 K/uL Final    Baso # 03/21/2022 0.04  0.00 - 0.20 K/uL Final    nRBC 03/21/2022 0  0 /100 WBC Final    Gran % 03/21/2022 66.7  38.0 - 73.0 % Final    Lymph % 03/21/2022 21.4  18.0 - 48.0 % Final    Mono % 03/21/2022 8.1  4.0 - 15.0 % Final    Eosinophil % 03/21/2022 2.6  0.0 - 8.0 % Final    Basophil % 03/21/2022 0.7  0.0 - 1.9 % Final    Differential Method 03/21/2022 Automated   Final    Total Protein 03/21/2022 5.7 (A) 6.0 - 8.4 g/dL Final    Albumin 03/21/2022 2.9 (A) 3.5 - 5.2 g/dL Final    Total Bilirubin 03/21/2022 0.6   0.1 - 1.0 mg/dL Final    Bilirubin, Direct 03/21/2022 0.3  0.1 - 0.3 mg/dL Final    AST 03/21/2022 18  10 - 40 U/L Final    ALT 03/21/2022 22  10 - 44 U/L Final    Alkaline Phosphatase 03/21/2022 82  55 - 135 U/L Final    Sodium 03/24/2022 139  136 - 145 mmol/L Final    Potassium 03/24/2022 3.8  3.5 - 5.1 mmol/L Final    Chloride 03/24/2022 105  95 - 110 mmol/L Final    CO2 03/24/2022 25  23 - 29 mmol/L Final    Glucose 03/24/2022 93  70 - 110 mg/dL Final    BUN 03/24/2022 68 (A) 8 - 23 mg/dL Final    Creatinine 03/24/2022 1.8 (A) 0.5 - 1.4 mg/dL Final    Calcium 03/24/2022 8.8  8.7 - 10.5 mg/dL Final    Anion Gap 03/24/2022 9  8 - 16 mmol/L Final    eGFR if  03/24/2022 31.5 (A) >60 mL/min/1.73 m^2 Final    eGFR if non  03/24/2022 27.3 (A) >60 mL/min/1.73 m^2 Final    Magnesium 03/24/2022 1.4 (A) 1.6 - 2.6 mg/dL Final    Phosphorus 03/24/2022 3.4  2.7 - 4.5 mg/dL Final    WBC 03/24/2022 6.59  3.90 - 12.70 K/uL Final    RBC 03/24/2022 3.05 (A) 4.00 - 5.40 M/uL Final    Hemoglobin 03/24/2022 9.0 (A) 12.0 - 16.0 g/dL Final    Hematocrit 03/24/2022 27.7 (A) 37.0 - 48.5 % Final    MCV 03/24/2022 91  82 - 98 fL Final    MCH 03/24/2022 29.5  27.0 - 31.0 pg Final    MCHC 03/24/2022 32.5  32.0 - 36.0 g/dL Final    RDW 03/24/2022 14.5  11.5 - 14.5 % Final    Platelets 03/24/2022 133 (A) 150 - 450 K/uL Final    MPV 03/24/2022 12.7  9.2 - 12.9 fL Final    Immature Granulocytes 03/24/2022 0.5  0.0 - 0.5 % Final    Gran # (ANC) 03/24/2022 4.4  1.8 - 7.7 K/uL Final    Immature Grans (Abs) 03/24/2022 0.03  0.00 - 0.04 K/uL Final    Lymph # 03/24/2022 1.4  1.0 - 4.8 K/uL Final    Mono # 03/24/2022 0.6  0.3 - 1.0 K/uL Final    Eos # 03/24/2022 0.1  0.0 - 0.5 K/uL Final    Baso # 03/24/2022 0.03  0.00 - 0.20 K/uL Final    nRBC 03/24/2022 0  0 /100 WBC Final    Gran % 03/24/2022 66.9  38.0 - 73.0 % Final    Lymph % 03/24/2022 21.7  18.0 - 48.0 % Final    Mono %  03/24/2022 8.3  4.0 - 15.0 % Final    Eosinophil % 03/24/2022 2.1  0.0 - 8.0 % Final    Basophil % 03/24/2022 0.5  0.0 - 1.9 % Final    Differential Method 03/24/2022 Automated   Final    Total Protein 03/24/2022 5.5 (A) 6.0 - 8.4 g/dL Final    Albumin 03/24/2022 2.9 (A) 3.5 - 5.2 g/dL Final    Total Bilirubin 03/24/2022 0.5  0.1 - 1.0 mg/dL Final    Bilirubin, Direct 03/24/2022 0.2  0.1 - 0.3 mg/dL Final    AST 03/24/2022 22  10 - 40 U/L Final    ALT 03/24/2022 29  10 - 44 U/L Final    Alkaline Phosphatase 03/24/2022 96  55 - 135 U/L Final    Sodium 03/28/2022 144  136 - 145 mmol/L Final    Potassium 03/28/2022 3.7  3.5 - 5.1 mmol/L Final    Chloride 03/28/2022 107  95 - 110 mmol/L Final    CO2 03/28/2022 27  23 - 29 mmol/L Final    Glucose 03/28/2022 88  70 - 110 mg/dL Final    BUN 03/28/2022 77 (A) 8 - 23 mg/dL Final    Creatinine 03/28/2022 1.6 (A) 0.5 - 1.4 mg/dL Final    Calcium 03/28/2022 9.1  8.7 - 10.5 mg/dL Final    Anion Gap 03/28/2022 10  8 - 16 mmol/L Final    eGFR if  03/28/2022 36.3 (A) >60 mL/min/1.73 m^2 Final    eGFR if non African American 03/28/2022 31.5 (A) >60 mL/min/1.73 m^2 Final    Magnesium 03/28/2022 1.5 (A) 1.6 - 2.6 mg/dL Final    Phosphorus 03/28/2022 3.4  2.7 - 4.5 mg/dL Final    WBC 03/28/2022 7.26  3.90 - 12.70 K/uL Final    RBC 03/28/2022 3.26 (A) 4.00 - 5.40 M/uL Final    Hemoglobin 03/28/2022 9.5 (A) 12.0 - 16.0 g/dL Final    Hematocrit 03/28/2022 30.9 (A) 37.0 - 48.5 % Final    MCV 03/28/2022 95  82 - 98 fL Final    MCH 03/28/2022 29.1  27.0 - 31.0 pg Final    MCHC 03/28/2022 30.7 (A) 32.0 - 36.0 g/dL Final    RDW 03/28/2022 14.1  11.5 - 14.5 % Final    Platelets 03/28/2022 145 (A) 150 - 450 K/uL Final    MPV 03/28/2022 13.2 (A) 9.2 - 12.9 fL Final    Immature Granulocytes 03/28/2022 0.4  0.0 - 0.5 % Final    Gran # (ANC) 03/28/2022 5.4  1.8 - 7.7 K/uL Final    Immature Grans (Abs) 03/28/2022 0.03  0.00 - 0.04 K/uL Final     Lymph # 03/28/2022 1.1  1.0 - 4.8 K/uL Final    Mono # 03/28/2022 0.6  0.3 - 1.0 K/uL Final    Eos # 03/28/2022 0.1  0.0 - 0.5 K/uL Final    Baso # 03/28/2022 0.04  0.00 - 0.20 K/uL Final    nRBC 03/28/2022 0  0 /100 WBC Final    Gran % 03/28/2022 73.7 (A) 38.0 - 73.0 % Final    Lymph % 03/28/2022 15.7 (A) 18.0 - 48.0 % Final    Mono % 03/28/2022 7.7  4.0 - 15.0 % Final    Eosinophil % 03/28/2022 1.9  0.0 - 8.0 % Final    Basophil % 03/28/2022 0.6  0.0 - 1.9 % Final    Differential Method 03/28/2022 Automated   Final    Total Protein 03/28/2022 5.7 (A) 6.0 - 8.4 g/dL Final    Albumin 03/28/2022 3.0 (A) 3.5 - 5.2 g/dL Final    Total Bilirubin 03/28/2022 0.5  0.1 - 1.0 mg/dL Final    Bilirubin, Direct 03/28/2022 0.3  0.1 - 0.3 mg/dL Final    AST 03/28/2022 24  10 - 40 U/L Final    ALT 03/28/2022 32  10 - 44 U/L Final    Alkaline Phosphatase 03/28/2022 96  55 - 135 U/L Final    POCT Glucose 03/28/2022 116 (A) 70 - 110 mg/dL Final    POCT Glucose 03/29/2022 123 (A) 70 - 110 mg/dL Final    Sodium 03/31/2022 139  136 - 145 mmol/L Final    Potassium 03/31/2022 3.8  3.5 - 5.1 mmol/L Final    Chloride 03/31/2022 101  95 - 110 mmol/L Final    CO2 03/31/2022 29  23 - 29 mmol/L Final    Glucose 03/31/2022 91  70 - 110 mg/dL Final    BUN 03/31/2022 68 (A) 8 - 23 mg/dL Final    Creatinine 03/31/2022 1.5 (A) 0.5 - 1.4 mg/dL Final    Calcium 03/31/2022 9.0  8.7 - 10.5 mg/dL Final    Anion Gap 03/31/2022 9  8 - 16 mmol/L Final    eGFR if  03/31/2022 39.3 (A) >60 mL/min/1.73 m^2 Final    eGFR if non  03/31/2022 34.1 (A) >60 mL/min/1.73 m^2 Final    Magnesium 03/31/2022 1.8  1.6 - 2.6 mg/dL Final    Phosphorus 03/31/2022 3.5  2.7 - 4.5 mg/dL Final    WBC 03/31/2022 6.43  3.90 - 12.70 K/uL Final    RBC 03/31/2022 3.18 (A) 4.00 - 5.40 M/uL Final    Hemoglobin 03/31/2022 9.3 (A) 12.0 - 16.0 g/dL Final    Hematocrit 03/31/2022 29.8 (A) 37.0 - 48.5 % Final    MCV  03/31/2022 94  82 - 98 fL Final    MCH 03/31/2022 29.2  27.0 - 31.0 pg Final    MCHC 03/31/2022 31.2 (A) 32.0 - 36.0 g/dL Final    RDW 03/31/2022 13.7  11.5 - 14.5 % Final    Platelets 03/31/2022 132 (A) 150 - 450 K/uL Final    MPV 03/31/2022 13.0 (A) 9.2 - 12.9 fL Final    Immature Granulocytes 03/31/2022 0.5  0.0 - 0.5 % Final    Gran # (ANC) 03/31/2022 4.4  1.8 - 7.7 K/uL Final    Immature Grans (Abs) 03/31/2022 0.03  0.00 - 0.04 K/uL Final    Lymph # 03/31/2022 1.3  1.0 - 4.8 K/uL Final    Mono # 03/31/2022 0.5  0.3 - 1.0 K/uL Final    Eos # 03/31/2022 0.1  0.0 - 0.5 K/uL Final    Baso # 03/31/2022 0.04  0.00 - 0.20 K/uL Final    nRBC 03/31/2022 0  0 /100 WBC Final    Gran % 03/31/2022 67.9  38.0 - 73.0 % Final    Lymph % 03/31/2022 20.7  18.0 - 48.0 % Final    Mono % 03/31/2022 8.4  4.0 - 15.0 % Final    Eosinophil % 03/31/2022 1.9  0.0 - 8.0 % Final    Basophil % 03/31/2022 0.6  0.0 - 1.9 % Final    Differential Method 03/31/2022 Automated   Final    Total Protein 03/31/2022 5.7 (A) 6.0 - 8.4 g/dL Final    Albumin 03/31/2022 3.0 (A) 3.5 - 5.2 g/dL Final    Total Bilirubin 03/31/2022 0.4  0.1 - 1.0 mg/dL Final    Bilirubin, Direct 03/31/2022 0.2  0.1 - 0.3 mg/dL Final    AST 03/31/2022 18  10 - 40 U/L Final    ALT 03/31/2022 26  10 - 44 U/L Final    Alkaline Phosphatase 03/31/2022 94  55 - 135 U/L Final   Lab Visit on 03/21/2022   Component Date Value Ref Range Status    SARS-CoV2 (COVID-19) Qualitative P* 03/21/2022 Not Detected  Not Detected Final    SARS-COV-2- Cycle Number 03/21/2022 N/A   Final   Admission on 03/15/2022, Discharged on 03/18/2022   Component Date Value Ref Range Status    WBC 03/16/2022 6.39  3.90 - 12.70 K/uL Final    RBC 03/16/2022 2.86 (A) 4.00 - 5.40 M/uL Final    Hemoglobin 03/16/2022 8.3 (A) 12.0 - 16.0 g/dL Final    Hematocrit 03/16/2022 27.4 (A) 37.0 - 48.5 % Final    MCV 03/16/2022 96  82 - 98 fL Final    MCH 03/16/2022 29.0  27.0 - 31.0 pg Final     MCHC 03/16/2022 30.3 (A) 32.0 - 36.0 g/dL Final    RDW 03/16/2022 14.8 (A) 11.5 - 14.5 % Final    Platelets 03/16/2022 123 (A) 150 - 450 K/uL Final    MPV 03/16/2022 12.1  9.2 - 12.9 fL Final    Immature Granulocytes 03/16/2022 0.3  0.0 - 0.5 % Final    Gran # (ANC) 03/16/2022 4.5  1.8 - 7.7 K/uL Final    Immature Grans (Abs) 03/16/2022 0.02  0.00 - 0.04 K/uL Final    Lymph # 03/16/2022 1.1  1.0 - 4.8 K/uL Final    Mono # 03/16/2022 0.6  0.3 - 1.0 K/uL Final    Eos # 03/16/2022 0.2  0.0 - 0.5 K/uL Final    Baso # 03/16/2022 0.05  0.00 - 0.20 K/uL Final    nRBC 03/16/2022 0  0 /100 WBC Final    Gran % 03/16/2022 71.0  38.0 - 73.0 % Final    Lymph % 03/16/2022 16.4 (A) 18.0 - 48.0 % Final    Mono % 03/16/2022 8.8  4.0 - 15.0 % Final    Eosinophil % 03/16/2022 2.7  0.0 - 8.0 % Final    Basophil % 03/16/2022 0.8  0.0 - 1.9 % Final    Differential Method 03/16/2022 Automated   Final    Sodium 03/16/2022 140  136 - 145 mmol/L Final    Potassium 03/16/2022 5.3 (A) 3.5 - 5.1 mmol/L Final    Chloride 03/16/2022 110  95 - 110 mmol/L Final    CO2 03/16/2022 20 (A) 23 - 29 mmol/L Final    Glucose 03/16/2022 106  70 - 110 mg/dL Final    BUN 03/16/2022 74 (A) 8 - 23 mg/dL Final    Creatinine 03/16/2022 1.8 (A) 0.5 - 1.4 mg/dL Final    Calcium 03/16/2022 8.8  8.7 - 10.5 mg/dL Final    Total Protein 03/16/2022 5.6 (A) 6.0 - 8.4 g/dL Final    Albumin 03/16/2022 2.9 (A) 3.5 - 5.2 g/dL Final    Total Bilirubin 03/16/2022 0.4  0.1 - 1.0 mg/dL Final    Alkaline Phosphatase 03/16/2022 95  55 - 135 U/L Final    AST 03/16/2022 14  10 - 40 U/L Final    ALT 03/16/2022 13  10 - 44 U/L Final    Anion Gap 03/16/2022 10  8 - 16 mmol/L Final    eGFR if  03/16/2022 31.5 (A) >60 mL/min/1.73 m^2 Final    eGFR if non  03/16/2022 27.3 (A) >60 mL/min/1.73 m^2 Final    Troponin I 03/16/2022 0.014  0.000 - 0.026 ng/mL Final    BNP 03/16/2022 209 (A) 0 - 99 pg/mL Final    POC Rapid  COVID 03/16/2022 Negative  Negative Final     Acceptable 03/16/2022 Yes   Final    Sodium 03/16/2022 143  136 - 145 mmol/L Final    Potassium 03/16/2022 5.2 (A) 3.5 - 5.1 mmol/L Final    Chloride 03/16/2022 112 (A) 95 - 110 mmol/L Final    CO2 03/16/2022 22 (A) 23 - 29 mmol/L Final    Glucose 03/16/2022 96  70 - 110 mg/dL Final    BUN 03/16/2022 70 (A) 8 - 23 mg/dL Final    Creatinine 03/16/2022 1.6 (A) 0.5 - 1.4 mg/dL Final    Calcium 03/16/2022 8.8  8.7 - 10.5 mg/dL Final    Anion Gap 03/16/2022 9  8 - 16 mmol/L Final    eGFR if  03/16/2022 36.3 (A) >60 mL/min/1.73 m^2 Final    eGFR if non African American 03/16/2022 31.5 (A) >60 mL/min/1.73 m^2 Final    Magnesium 03/16/2022 1.8  1.6 - 2.6 mg/dL Final    Phosphorus 03/16/2022 3.8  2.7 - 4.5 mg/dL Final    WBC 03/16/2022 6.47  3.90 - 12.70 K/uL Final    RBC 03/16/2022 3.05 (A) 4.00 - 5.40 M/uL Final    Hemoglobin 03/16/2022 8.8 (A) 12.0 - 16.0 g/dL Final    Hematocrit 03/16/2022 29.4 (A) 37.0 - 48.5 % Final    MCV 03/16/2022 96  82 - 98 fL Final    MCH 03/16/2022 28.9  27.0 - 31.0 pg Final    MCHC 03/16/2022 29.9 (A) 32.0 - 36.0 g/dL Final    RDW 03/16/2022 14.8 (A) 11.5 - 14.5 % Final    Platelets 03/16/2022 135 (A) 150 - 450 K/uL Final    MPV 03/16/2022 12.7  9.2 - 12.9 fL Final    Immature Granulocytes 03/16/2022 0.5  0.0 - 0.5 % Final    Gran # (ANC) 03/16/2022 4.8  1.8 - 7.7 K/uL Final    Immature Grans (Abs) 03/16/2022 0.03  0.00 - 0.04 K/uL Final    Lymph # 03/16/2022 0.9 (A) 1.0 - 4.8 K/uL Final    Mono # 03/16/2022 0.5  0.3 - 1.0 K/uL Final    Eos # 03/16/2022 0.1  0.0 - 0.5 K/uL Final    Baso # 03/16/2022 0.02  0.00 - 0.20 K/uL Final    nRBC 03/16/2022 0  0 /100 WBC Final    Gran % 03/16/2022 74.5 (A) 38.0 - 73.0 % Final    Lymph % 03/16/2022 14.2 (A) 18.0 - 48.0 % Final    Mono % 03/16/2022 8.3  4.0 - 15.0 % Final    Eosinophil % 03/16/2022 2.2  0.0 - 8.0 % Final    Basophil % 03/16/2022  0.3  0.0 - 1.9 % Final    Differential Method 03/16/2022 Automated   Final    Troponin I 03/16/2022 0.015  0.000 - 0.026 ng/mL Final    Sodium 03/16/2022 144  136 - 145 mmol/L Final    Potassium 03/16/2022 4.9  3.5 - 5.1 mmol/L Final    Chloride 03/16/2022 110  95 - 110 mmol/L Final    CO2 03/16/2022 24  23 - 29 mmol/L Final    Glucose 03/16/2022 95  70 - 110 mg/dL Final    BUN 03/16/2022 62 (A) 8 - 23 mg/dL Final    Creatinine 03/16/2022 1.5 (A) 0.5 - 1.4 mg/dL Final    Calcium 03/16/2022 9.1  8.7 - 10.5 mg/dL Final    Anion Gap 03/16/2022 10  8 - 16 mmol/L Final    eGFR if  03/16/2022 39.3 (A) >60 mL/min/1.73 m^2 Final    eGFR if non  03/16/2022 34.1 (A) >60 mL/min/1.73 m^2 Final    POCT Glucose 03/16/2022 93  70 - 110 mg/dL Final    POCT Glucose 03/16/2022 168 (A) 70 - 110 mg/dL Final    Sodium 03/17/2022 144  136 - 145 mmol/L Final    Potassium 03/17/2022 4.1  3.5 - 5.1 mmol/L Final    Chloride 03/17/2022 110  95 - 110 mmol/L Final    CO2 03/17/2022 25  23 - 29 mmol/L Final    Glucose 03/17/2022 86  70 - 110 mg/dL Final    BUN 03/17/2022 65 (A) 8 - 23 mg/dL Final    Creatinine 03/17/2022 1.5 (A) 0.5 - 1.4 mg/dL Final    Calcium 03/17/2022 8.9  8.7 - 10.5 mg/dL Final    Anion Gap 03/17/2022 9  8 - 16 mmol/L Final    eGFR if  03/17/2022 39.3 (A) >60 mL/min/1.73 m^2 Final    eGFR if non  03/17/2022 34.1 (A) >60 mL/min/1.73 m^2 Final    Magnesium 03/17/2022 1.8  1.6 - 2.6 mg/dL Final    Phosphorus 03/17/2022 4.1  2.7 - 4.5 mg/dL Final    WBC 03/17/2022 5.09  3.90 - 12.70 K/uL Final    RBC 03/17/2022 2.90 (A) 4.00 - 5.40 M/uL Final    Hemoglobin 03/17/2022 8.3 (A) 12.0 - 16.0 g/dL Final    Hematocrit 03/17/2022 27.0 (A) 37.0 - 48.5 % Final    MCV 03/17/2022 93  82 - 98 fL Final    MCH 03/17/2022 28.6  27.0 - 31.0 pg Final    MCHC 03/17/2022 30.7 (A) 32.0 - 36.0 g/dL Final    RDW 03/17/2022 14.9 (A) 11.5 - 14.5 % Final     Platelets 03/17/2022 133 (A) 150 - 450 K/uL Final    MPV 03/17/2022 12.8  9.2 - 12.9 fL Final    Immature Granulocytes 03/17/2022 0.4  0.0 - 0.5 % Final    Gran # (ANC) 03/17/2022 3.3  1.8 - 7.7 K/uL Final    Immature Grans (Abs) 03/17/2022 0.02  0.00 - 0.04 K/uL Final    Lymph # 03/17/2022 1.1  1.0 - 4.8 K/uL Final    Mono # 03/17/2022 0.5  0.3 - 1.0 K/uL Final    Eos # 03/17/2022 0.1  0.0 - 0.5 K/uL Final    Baso # 03/17/2022 0.03  0.00 - 0.20 K/uL Final    nRBC 03/17/2022 0  0 /100 WBC Final    Gran % 03/17/2022 65.6  38.0 - 73.0 % Final    Lymph % 03/17/2022 21.6  18.0 - 48.0 % Final    Mono % 03/17/2022 9.0  4.0 - 15.0 % Final    Eosinophil % 03/17/2022 2.8  0.0 - 8.0 % Final    Basophil % 03/17/2022 0.6  0.0 - 1.9 % Final    Differential Method 03/17/2022 Automated   Final    POCT Glucose 03/17/2022 85  70 - 110 mg/dL Final    POCT Glucose 03/17/2022 99  70 - 110 mg/dL Final    Retic 03/17/2022 3.7 (A) 0.5 - 2.5 % Final    Ferritin 03/17/2022 187  20.0 - 300.0 ng/mL Final    POCT Glucose 03/17/2022 130 (A) 70 - 110 mg/dL Final    POCT Glucose 03/17/2022 99  70 - 110 mg/dL Final    Sodium 03/18/2022 142  136 - 145 mmol/L Final    Potassium 03/18/2022 3.9  3.5 - 5.1 mmol/L Final    Chloride 03/18/2022 108  95 - 110 mmol/L Final    CO2 03/18/2022 25  23 - 29 mmol/L Final    Glucose 03/18/2022 82  70 - 110 mg/dL Final    BUN 03/18/2022 62 (A) 8 - 23 mg/dL Final    Creatinine 03/18/2022 1.6 (A) 0.5 - 1.4 mg/dL Final    Calcium 03/18/2022 8.9  8.7 - 10.5 mg/dL Final    Anion Gap 03/18/2022 9  8 - 16 mmol/L Final    eGFR if  03/18/2022 36.3 (A) >60 mL/min/1.73 m^2 Final    eGFR if non African American 03/18/2022 31.5 (A) >60 mL/min/1.73 m^2 Final    Magnesium 03/18/2022 1.7  1.6 - 2.6 mg/dL Final    Phosphorus 03/18/2022 3.2  2.7 - 4.5 mg/dL Final    WBC 03/18/2022 5.52  3.90 - 12.70 K/uL Final    RBC 03/18/2022 3.15 (A) 4.00 - 5.40 M/uL Final    Hemoglobin  03/18/2022 9.1 (A) 12.0 - 16.0 g/dL Final    Hematocrit 03/18/2022 30.0 (A) 37.0 - 48.5 % Final    MCV 03/18/2022 95  82 - 98 fL Final    MCH 03/18/2022 28.9  27.0 - 31.0 pg Final    MCHC 03/18/2022 30.3 (A) 32.0 - 36.0 g/dL Final    RDW 03/18/2022 14.8 (A) 11.5 - 14.5 % Final    Platelets 03/18/2022 138 (A) 150 - 450 K/uL Final    MPV 03/18/2022 12.5  9.2 - 12.9 fL Final    Immature Granulocytes 03/18/2022 0.4  0.0 - 0.5 % Final    Gran # (ANC) 03/18/2022 3.8  1.8 - 7.7 K/uL Final    Immature Grans (Abs) 03/18/2022 0.02  0.00 - 0.04 K/uL Final    Lymph # 03/18/2022 1.1  1.0 - 4.8 K/uL Final    Mono # 03/18/2022 0.5  0.3 - 1.0 K/uL Final    Eos # 03/18/2022 0.1  0.0 - 0.5 K/uL Final    Baso # 03/18/2022 0.02  0.00 - 0.20 K/uL Final    nRBC 03/18/2022 0  0 /100 WBC Final    Gran % 03/18/2022 68.6  38.0 - 73.0 % Final    Lymph % 03/18/2022 19.2  18.0 - 48.0 % Final    Mono % 03/18/2022 8.9  4.0 - 15.0 % Final    Eosinophil % 03/18/2022 2.5  0.0 - 8.0 % Final    Basophil % 03/18/2022 0.4  0.0 - 1.9 % Final    Differential Method 03/18/2022 Automated   Final    POCT Glucose 03/18/2022 95  70 - 110 mg/dL Final    POCT Glucose 03/18/2022 174 (A) 70 - 110 mg/dL Final    POCT Glucose 03/18/2022 116 (A) 70 - 110 mg/dL Final   No results displayed because visit has over 200 results.      Lab Visit on 03/14/2022   Component Date Value Ref Range Status    SARS-CoV2 (COVID-19) Qualitative P* 03/14/2022 Not Detected  Not Detected Final    SARS-COV-2- Cycle Number 03/14/2022 N/A   Final   Lab Visit on 03/10/2022   Component Date Value Ref Range Status    SARS-CoV2 (COVID-19) Qualitative P* 03/10/2022 Not Detected  Not Detected Final    SARS-COV-2- Cycle Number 03/10/2022 N/A   Final   There may be more visits with results that are not included.       Past Medical History:   Diagnosis Date    CAD (coronary artery disease)     Cancer     colon    CHF (congestive heart failure)     Colitis     Colon  cancer     COPD (chronic obstructive pulmonary disease)     Decreased hearing     Diabetes mellitus     Diabetes mellitus, type 2     Hypercholesterolemia     Hypertension     Hypoxemia     Insomnia     Obesity     Osteoarthritis      Past Surgical History:   Procedure Laterality Date    ANGIOGRAM, CORONARY, WITH LEFT HEART CATHETERIZATION N/A 2/10/2022    Procedure: Angiogram, Coronary, with Left Heart Cath;  Surgeon: Cristian Benjamin MD;  Location: [facility] CATH/EP LAB;  Service: Cardiology;  Laterality: N/A;    CARDIAC CATHETERIZATION      CHOLECYSTECTOMY      COLECTOMY      CORONARY ANGIOPLASTY      CORONARY STENT PLACEMENT      EXTERNAL EAR SURGERY      EYE SURGERY      FLEXIBLE SIGMOIDOSCOPY N/A 9/19/2019    Procedure: SIGMOIDOSCOPY, FLEXIBLE;  Surgeon: Biju Kramer III, MD;  Location: [facility] ENDO;  Service: Endoscopy;  Laterality: N/A;    HYSTERECTOMY      partial     Family History   Problem Relation Age of Onset    Febrile seizures Mother     Heart failure Father        Marital Status:   Alcohol History:  reports current alcohol use.  Tobacco History:  reports that she quit smoking about 16 years ago. Her smoking use included cigarettes. She started smoking about 58 years ago. She has a 150.00 pack-year smoking history. She has never used smokeless tobacco.  Drug History:  reports no history of drug use.    Review of patient's allergies indicates:   Allergen Reactions    Iodinated contrast media     Strawberries [strawberry] Hives and Itching       Current Outpatient Medications:     acetaminophen (TYLENOL) 325 MG tablet, Take 2 tablets (650 mg total) by mouth every 4 (four) hours as needed., Disp: , Rfl: 0    albuterol (VENTOLIN HFA) 90 mcg/actuation inhaler, Inhale 2 puffs into the lungs every 6 (six) hours as needed for Wheezing or Shortness of Breath. Rescue, Disp: 36 g, Rfl: 3    aspirin (ECOTRIN) 81 MG EC tablet, Take 1 tablet (81 mg total) by mouth every  morning., Disp: 90 tablet, Rfl: 3    atorvastatin (LIPITOR) 80 MG tablet, Take 1 tablet (80 mg total) by mouth once daily. (Patient taking differently: Take 80 mg by mouth every evening.), Disp: 90 tablet, Rfl: 1    cholestyramine (QUESTRAN) 4 gram packet, Take 1 packet (4 g total) by mouth 2 (two) times daily., Disp: 180 packet, Rfl: 3    coenzyme Q10 100 mg capsule, Take 100 mg by mouth every morning., Disp: , Rfl:     colchicine (COLCRYS) 0.6 mg tablet, Take 1/2 tablet by mouth every other day (Patient taking differently: Take 0.6 mg by mouth every other day. Take 1/2 tablet by mouth every other day), Disp: 15 tablet, Rfl: 11    diltiaZEM (CARDIZEM CD) 120 MG Cp24, Take 1 capsule (120 mg total) by mouth once daily., Disp: 30 capsule, Rfl: 5    ergocalciferol (VITAMIN D2) 50,000 unit Cap, Take 1 capsule (50,000 Units total) by mouth every 7 days. (Patient taking differently: Take 50,000 Units by mouth every Monday.), Disp: 12 capsule, Rfl: 1    fish oil-omega-3 fatty acids 300-1,000 mg capsule, Take 2 capsules by mouth every morning., Disp: , Rfl:     fluticasone-salmeterol diskus inhaler 250-50 mcg, Inhale 1 puff into the lungs 2 (two) times daily., Disp: 3 each, Rfl: 1    gabapentin (NEURONTIN) 100 MG capsule, Take 1 capsule (100 mg total) by mouth every evening., Disp: 30 capsule, Rfl: 3    ipratropium-albuteroL (COMBIVENT RESPIMAT)  mcg/actuation inhaler, Inhale 2 puffs into the lungs every 4 (four) hours as needed for Shortness of Breath. Rescue, Disp: 12 g, Rfl: 3    pantoprazole (PROTONIX) 40 MG tablet, Take 1 tablet (40 mg total) by mouth once daily., Disp: 30 tablet, Rfl: 5    torsemide (DEMADEX) 10 MG Tab, Take 1 tablet (10 mg total) by mouth once daily., Disp: 30 tablet, Rfl: 5    umeclidinium (INCRUSE ELLIPTA) 62.5 mcg/actuation inhalation capsule, Inhale 62.5 mcg into the lungs every morning. Controller, Disp: 30 each, Rfl: 11    vit C/E/Zn/coppr/lutein/zeaxan (PRESERVISION AREDS-2  ORAL), Take 1 tablet by mouth once daily., Disp: , Rfl:     isosorbide mononitrate (IMDUR) 120 MG 24 hr tablet, Take 1 tablet (120 mg total) by mouth once daily., Disp: 90 tablet, Rfl: 1    metoprolol succinate (TOPROL-XL) 50 MG 24 hr tablet, Take 1 tablet (50 mg total) by mouth once daily., Disp: 90 tablet, Rfl: 1    ranolazine (RANEXA) 500 MG Tb12, Take 1 tablet (500 mg total) by mouth 2 (two) times daily., Disp: 180 tablet, Rfl: 1    Review of Systems   Constitutional: Negative for appetite change, chills, fatigue, fever and unexpected weight change.   HENT: Negative for congestion, ear pain, sinus pain, sore throat and trouble swallowing.    Eyes: Negative for pain, discharge and visual disturbance.   Respiratory: Positive for chest tightness and shortness of breath. Negative for apnea, cough and wheezing.    Cardiovascular: Positive for chest pain. Negative for palpitations and leg swelling.   Gastrointestinal: Negative for abdominal pain, blood in stool, constipation, diarrhea, nausea and vomiting.   Endocrine: Negative for heat intolerance, polydipsia and polyuria.   Genitourinary: Negative for difficulty urinating, dyspareunia, dysuria, frequency, hematuria and menstrual problem.   Musculoskeletal: Positive for gait problem ( walks with the Rollator or 2 canes.). Negative for arthralgias, back pain, joint swelling and myalgias.   Skin: Positive for wound ( chronic sacral decubitus).   Allergic/Immunologic: Negative for environmental allergies, food allergies and immunocompromised state.   Neurological: Negative for dizziness, tremors, seizures, numbness and headaches.   Psychiatric/Behavioral: Negative for behavioral problems, confusion, hallucinations and suicidal ideas. The patient is not nervous/anxious.         Objective:      There were no vitals filed for this visit.  Physical Exam  Vitals and nursing note reviewed.   Constitutional:       Appearance: She is well-developed. She is obese. She is  ill-appearing. She is not toxic-appearing.   HENT:      Head: Normocephalic and atraumatic.      Right Ear: Tympanic membrane and external ear normal.      Left Ear: Tympanic membrane and external ear normal.      Nose: Nose normal.      Mouth/Throat:      Pharynx: Oropharynx is clear.   Eyes:      Pupils: Pupils are equal, round, and reactive to light.   Neck:      Thyroid: No thyromegaly.      Vascular: No carotid bruit.   Cardiovascular:      Rate and Rhythm: Normal rate and regular rhythm.      Heart sounds: Normal heart sounds. No murmur heard.  Pulmonary:      Effort: Pulmonary effort is normal.      Breath sounds: Normal breath sounds. No wheezing or rales.   Abdominal:      General: Bowel sounds are normal. There is no distension.      Palpations: Abdomen is soft.      Tenderness: There is no abdominal tenderness.   Musculoskeletal:         General: No tenderness or deformity. Normal range of motion.      Cervical back: Normal range of motion and neck supple.      Lumbar back: Normal. No spasms.      Comments: Bends 90 degrees at  waist   Lymphadenopathy:      Cervical: No cervical adenopathy.   Skin:     General: Skin is warm and dry.      Findings: No rash.      Comments: Sacral wound is now decreased to a mere pinhole, but it is deep    Neurological:      Mental Status: She is alert and oriented to person, place, and time.      Cranial Nerves: No cranial nerve deficit.      Coordination: Coordination normal.   Psychiatric:         Behavior: Behavior normal.         Thought Content: Thought content normal.         Judgment: Judgment normal.      Comments: Anxious female         Assessment:       1. Coronary artery disease of native artery of native heart with stable angina pectoris    2. Essential hypertension, benign    3. Varicose veins of unspecified lower extremity with ulcer of unspecified site (CODE)    4. Atherosclerosis of native coronary artery of native heart with unstable angina pectoris    5.  Pure hypercholesterolemia    6. Venous stasis    7. Bilateral nonobstructing nephrolithiasis    8. Stage 3b chronic kidney disease    9. DM type 2, controlled, with complication    10. Chronic obstructive pulmonary disease, unspecified COPD type    11. Unstable angina    12. Hospital discharge follow-up         Plan:       Coronary artery disease of native artery of native heart with stable angina pectoris  -     ranolazine (RANEXA) 500 MG Tb12; Take 1 tablet (500 mg total) by mouth 2 (two) times daily.  Dispense: 180 tablet; Refill: 1  -     Discontinue: isosorbide mononitrate (IMDUR) 120 MG 24 hr tablet; Take 1 tablet (120 mg total) by mouth once daily.  Dispense: 90 tablet; Refill: 1  -     isosorbide mononitrate (IMDUR) 120 MG 24 hr tablet; Take 1 tablet (120 mg total) by mouth once daily.  Dispense: 90 tablet; Refill: 1  Patient is not a surgical candidate.  Will try to stabilize with medications.  Essential hypertension, benign  Comments:  BP well controlled  Orders:  -     metoprolol succinate (TOPROL-XL) 50 MG 24 hr tablet; Take 1 tablet (50 mg total) by mouth once daily.  Dispense: 90 tablet; Refill: 1    Varicose veins of unspecified lower extremity with ulcer of unspecified site (CODE)  Chronic venous stasis is a problem.  Atherosclerosis of native coronary artery of native heart with unstable angina pectoris    Pure hypercholesterolemia    Venous stasis    Bilateral nonobstructing nephrolithiasis    Stage 3b chronic kidney disease    DM type 2, controlled, with complication    Chronic obstructive pulmonary disease, unspecified COPD type  Continues to wear O2 24/7.   Unstable angina    Hospital discharge follow-up  Hospital medications reconciled home medications.  She has home health nursing continuing to come for sacral decubitus.    No follow-ups on file.

## 2022-08-02 PROCEDURE — G0179 PR HOME HEALTH MD RECERTIFICATION: ICD-10-PCS | Mod: ,,, | Performed by: FAMILY MEDICINE

## 2022-08-02 PROCEDURE — G0179 MD RECERTIFICATION HHA PT: HCPCS | Mod: ,,, | Performed by: FAMILY MEDICINE

## 2022-08-04 ENCOUNTER — TELEPHONE (OUTPATIENT)
Dept: FAMILY MEDICINE | Facility: CLINIC | Age: 77
End: 2022-08-04

## 2022-08-04 RX ORDER — TEMAZEPAM 15 MG/1
1 CAPSULE ORAL DAILY
COMMUNITY
End: 2022-08-04 | Stop reason: SDUPTHER

## 2022-08-04 RX ORDER — TEMAZEPAM 15 MG/1
15 CAPSULE ORAL DAILY
Qty: 30 CAPSULE | Refills: 4 | Status: ON HOLD | OUTPATIENT
Start: 2022-08-04 | End: 2023-01-19 | Stop reason: HOSPADM

## 2022-08-04 NOTE — TELEPHONE ENCOUNTER
----- Message from Zakia Beck sent at 8/4/2022  1:20 PM CDT -----  Vm- pt needs refill on [facility]   [phone]

## 2022-08-04 NOTE — TELEPHONE ENCOUNTER
----- Message from Hanh Beck MA sent at 8/4/2022  2:13 PM CDT -----  Pt is calling for a refill on her ranexa,cardizem, atorvastatin, and temazepam.

## 2022-08-08 DIAGNOSIS — I25.118 CORONARY ARTERY DISEASE OF NATIVE ARTERY OF NATIVE HEART WITH STABLE ANGINA PECTORIS: ICD-10-CM

## 2022-08-08 RX ORDER — RANOLAZINE 500 MG/1
500 TABLET, EXTENDED RELEASE ORAL 2 TIMES DAILY
Qty: 180 TABLET | Refills: 1 | Status: SHIPPED | OUTPATIENT
Start: 2022-08-08 | End: 2022-08-09 | Stop reason: SDUPTHER

## 2022-08-08 NOTE — TELEPHONE ENCOUNTER
----- Message from Kimberly Root sent at 8/8/2022 11:53 AM CDT -----  Refill Ranolazine. The pharmacy said they have no new prescriptions or  refills. She is out. [facility] [address]. Pt's # [phone]

## 2022-08-09 DIAGNOSIS — I25.118 CORONARY ARTERY DISEASE OF NATIVE ARTERY OF NATIVE HEART WITH STABLE ANGINA PECTORIS: ICD-10-CM

## 2022-08-09 RX ORDER — RANOLAZINE 500 MG/1
500 TABLET, EXTENDED RELEASE ORAL 2 TIMES DAILY
Qty: 180 TABLET | Refills: 1 | Status: SHIPPED | OUTPATIENT
Start: 2022-08-09 | End: 2022-09-09 | Stop reason: SDUPTHER

## 2022-08-09 NOTE — TELEPHONE ENCOUNTER
----- Message from Kimberly Root sent at 8/9/2022 10:36 AM CDT -----  Please resend Ranolazine # 180 to [facility]. I spoke to the pharmacy to make  sure they did not get the prescription. They did not get it. Pt's #   [phone]

## 2022-08-10 ENCOUNTER — TELEPHONE (OUTPATIENT)
Dept: FAMILY MEDICINE | Facility: CLINIC | Age: 77
End: 2022-08-10

## 2022-08-10 NOTE — TELEPHONE ENCOUNTER
Left message on [facility] with [facility] voice mail in regards to message sent. Verbalized on voice mail that if the patient is not having any worsening shortness of breath, would continue same for now.

## 2022-08-10 NOTE — TELEPHONE ENCOUNTER
Spoke with [facility] with [facility], states they were looking for a medication on her list but they found it. States the patient also had a 4lb weight gain in one week. No other symptoms and vitals are wnl.

## 2022-08-10 NOTE — TELEPHONE ENCOUNTER
----- Message from Ronda Winter sent at 8/10/2022 11:43 AM CDT -----  Kai peguero with [facility] is calling to talk to nurse   [phone]

## 2022-08-16 ENCOUNTER — EXTERNAL HOME HEALTH (OUTPATIENT)
Dept: HOME HEALTH SERVICES | Facility: HOSPITAL | Age: 77
End: 2022-08-16
Payer: MEDICARE

## 2022-08-16 RX ORDER — GABAPENTIN 100 MG/1
100 CAPSULE ORAL NIGHTLY
Qty: 30 CAPSULE | Refills: 3 | Status: SHIPPED | OUTPATIENT
Start: 2022-08-16 | End: 2022-12-21 | Stop reason: SDUPTHER

## 2022-08-16 NOTE — TELEPHONE ENCOUNTER
----- Message from Vera Edmondson MA sent at 8/16/2022 11:32 AM CDT -----  Regarding: refill  Patient needs refill on gabapentin 100 mg cap-sci #30  [facility] [address]  [phone]

## 2022-08-26 ENCOUNTER — LAB VISIT (OUTPATIENT)
Dept: LAB | Facility: HOSPITAL | Age: 77
End: 2022-08-26
Attending: NURSE PRACTITIONER
Payer: MEDICARE

## 2022-08-26 DIAGNOSIS — E11.21 DIABETIC GLOMERULOPATHY: Primary | ICD-10-CM

## 2022-08-26 DIAGNOSIS — I13.0 HYPERTENSIVE HEART AND RENAL DISEASE WITH CONGESTIVE HEART FAILURE: ICD-10-CM

## 2022-08-26 LAB
ALBUMIN SERPL BCP-MCNC: 3.1 G/DL (ref 3.5–5.2)
ALP SERPL-CCNC: 87 U/L (ref 55–135)
ALT SERPL W/O P-5'-P-CCNC: 17 U/L (ref 10–44)
ANION GAP SERPL CALC-SCNC: 9 MMOL/L (ref 8–16)
AST SERPL-CCNC: 17 U/L (ref 10–40)
BASOPHILS # BLD AUTO: 0.04 K/UL (ref 0–0.2)
BASOPHILS NFR BLD: 0.6 % (ref 0–1.9)
BILIRUB SERPL-MCNC: 0.3 MG/DL (ref 0.1–1)
BUN SERPL-MCNC: 31 MG/DL (ref 8–23)
CALCIUM SERPL-MCNC: 9.2 MG/DL (ref 8.7–10.5)
CHLORIDE SERPL-SCNC: 105 MMOL/L (ref 95–110)
CO2 SERPL-SCNC: 28 MMOL/L (ref 23–29)
CREAT SERPL-MCNC: 1.7 MG/DL (ref 0.5–1.4)
CRP SERPL-MCNC: 9.8 MG/L (ref 0–8.2)
DIFFERENTIAL METHOD: ABNORMAL
EOSINOPHIL # BLD AUTO: 0.1 K/UL (ref 0–0.5)
EOSINOPHIL NFR BLD: 2.1 % (ref 0–8)
ERYTHROCYTE [DISTWIDTH] IN BLOOD BY AUTOMATED COUNT: 14.6 % (ref 11.5–14.5)
ERYTHROCYTE [SEDIMENTATION RATE] IN BLOOD BY WESTERGREN METHOD: 10 MM/HR (ref 0–20)
EST. GFR  (NO RACE VARIABLE): 31 ML/MIN/1.73 M^2
ESTIMATED AVG GLUCOSE: 97 MG/DL (ref 68–131)
GLUCOSE SERPL-MCNC: 86 MG/DL (ref 70–110)
HBA1C MFR BLD: 5 % (ref 4–5.6)
HCT VFR BLD AUTO: 35.5 % (ref 37–48.5)
HGB BLD-MCNC: 10.9 G/DL (ref 12–16)
IMM GRANULOCYTES # BLD AUTO: 0.02 K/UL (ref 0–0.04)
IMM GRANULOCYTES NFR BLD AUTO: 0.3 % (ref 0–0.5)
LYMPHOCYTES # BLD AUTO: 1.4 K/UL (ref 1–4.8)
LYMPHOCYTES NFR BLD: 21.8 % (ref 18–48)
MCH RBC QN AUTO: 29.3 PG (ref 27–31)
MCHC RBC AUTO-ENTMCNC: 30.7 G/DL (ref 32–36)
MCV RBC AUTO: 95 FL (ref 82–98)
MONOCYTES # BLD AUTO: 0.6 K/UL (ref 0.3–1)
MONOCYTES NFR BLD: 8.3 % (ref 4–15)
NEUTROPHILS # BLD AUTO: 4.4 K/UL (ref 1.8–7.7)
NEUTROPHILS NFR BLD: 66.9 % (ref 38–73)
NRBC BLD-RTO: 0 /100 WBC
PLATELET # BLD AUTO: 168 K/UL (ref 150–450)
PMV BLD AUTO: 11.9 FL (ref 9.2–12.9)
POTASSIUM SERPL-SCNC: 5.3 MMOL/L (ref 3.5–5.1)
PREALB SERPL-MCNC: 24 MG/DL (ref 20–43)
PROT SERPL-MCNC: 6.1 G/DL (ref 6–8.4)
RBC # BLD AUTO: 3.72 M/UL (ref 4–5.4)
SODIUM SERPL-SCNC: 142 MMOL/L (ref 136–145)
WBC # BLD AUTO: 6.61 K/UL (ref 3.9–12.7)

## 2022-08-26 PROCEDURE — 83036 HEMOGLOBIN GLYCOSYLATED A1C: CPT | Performed by: NURSE PRACTITIONER

## 2022-08-26 PROCEDURE — 86140 C-REACTIVE PROTEIN: CPT | Performed by: NURSE PRACTITIONER

## 2022-08-26 PROCEDURE — 85651 RBC SED RATE NONAUTOMATED: CPT | Performed by: NURSE PRACTITIONER

## 2022-08-26 PROCEDURE — 80053 COMPREHEN METABOLIC PANEL: CPT | Performed by: NURSE PRACTITIONER

## 2022-08-26 PROCEDURE — 85025 COMPLETE CBC W/AUTO DIFF WBC: CPT | Performed by: NURSE PRACTITIONER

## 2022-08-26 PROCEDURE — 36415 COLL VENOUS BLD VENIPUNCTURE: CPT | Performed by: NURSE PRACTITIONER

## 2022-08-26 PROCEDURE — 84134 ASSAY OF PREALBUMIN: CPT | Performed by: NURSE PRACTITIONER

## 2022-09-07 ENCOUNTER — TELEPHONE (OUTPATIENT)
Dept: FAMILY MEDICINE | Facility: CLINIC | Age: 77
End: 2022-09-07

## 2022-09-07 DIAGNOSIS — I25.118 CORONARY ARTERY DISEASE OF NATIVE ARTERY OF NATIVE HEART WITH STABLE ANGINA PECTORIS: ICD-10-CM

## 2022-09-07 NOTE — TELEPHONE ENCOUNTER
----- Message from Ronda Winter sent at 9/7/2022 12:58 PM CDT -----  [facility] coats with [facility] is calling about a drug interaction   [phone]

## 2022-09-07 NOTE — TELEPHONE ENCOUNTER
Spoke with pharmacist who wants to make sure it is okay for her to fill her colchicine and ranolazine together.

## 2022-09-07 NOTE — TELEPHONE ENCOUNTER
----- Message from Ronda Winter sent at 9/7/2022 12:58 PM CDT -----  Kai coats with [facility] is calling about a drug interaction   [phone]

## 2022-09-07 NOTE — TELEPHONE ENCOUNTER
----- Message from Anni López sent at 9/7/2022  2:12 PM CDT -----  Patient called and stated that she called the pharmacy to get refills on cholestyramine and her nitroglycerin called into [facility] on [address] if any questions please give her a call at [phone]

## 2022-09-07 NOTE — TELEPHONE ENCOUNTER
----- Message from Kalin Walters MA sent at 9/7/2022 12:46 PM CDT -----  Contact: STIVEN ARAUJO [[id number]]  Type: Needs Medical Advice    Who Called: STIVEN ARAUJO [[id number]]  Best Call Back Number: [phone]  Inquiry/Question: Please call STIVEN ARAUJO [[id number]] regarding medication concerns  pt stated all meds were stopped per pharmacy        Thank you~

## 2022-09-08 RX ORDER — NITROGLYCERIN 400 UG/1
1 SPRAY ORAL EVERY 5 MIN PRN
COMMUNITY
End: 2022-09-08 | Stop reason: SDUPTHER

## 2022-09-08 RX ORDER — NITROGLYCERIN 400 UG/1
1 SPRAY ORAL EVERY 5 MIN PRN
Qty: 4.9 G | Refills: 1 | Status: ON HOLD | OUTPATIENT
Start: 2022-09-08 | End: 2023-08-30 | Stop reason: HOSPADM

## 2022-09-08 NOTE — TELEPHONE ENCOUNTER
----- Message from Sindhu Arango sent at 9/8/2022 10:34 AM CDT -----  Type:  Patient Returning Call  Who Called:  Pt   Who Left Message for Patient:  Renita  Does the patient know what this is regarding?:  medications   Best Call Back Number:  [phone]  Additional Information:  Pt requesting a call back from Renita, pt missed her call.

## 2022-09-08 NOTE — TELEPHONE ENCOUNTER
S/w pt & she said that the pharmacy told her that we canceled her ntg. I told her we havent canceled anything. Advised will send in refill

## 2022-09-09 RX ORDER — CHOLESTYRAMINE 4 G/9G
1 POWDER, FOR SUSPENSION ORAL 2 TIMES DAILY
Qty: 180 PACKET | Refills: 3 | Status: SHIPPED | OUTPATIENT
Start: 2022-09-09 | End: 2023-07-22

## 2022-09-09 RX ORDER — RANOLAZINE 500 MG/1
500 TABLET, EXTENDED RELEASE ORAL 2 TIMES DAILY
Qty: 180 TABLET | Refills: 1 | Status: SHIPPED | OUTPATIENT
Start: 2022-09-09 | End: 2022-12-21 | Stop reason: SDUPTHER

## 2022-09-09 NOTE — TELEPHONE ENCOUNTER
Okay per Dr. Dwyer to fill those medications together, she is on a small dose of colchicine. Tried calling pharmacy and was not able to get through. New prescription sent with note to pharmacy.

## 2022-09-19 ENCOUNTER — TELEPHONE (OUTPATIENT)
Dept: CARDIOLOGY | Facility: CLINIC | Age: 77
End: 2022-09-19
Payer: MEDICARE

## 2022-09-19 NOTE — TELEPHONE ENCOUNTER
----- Message from Kalin Walters MA sent at 9/19/2022 12:19 PM CDT -----  Contact: STIVEN ARAUJO [[id number]]  Type: Needs Medical Advice    Who Called: STIVEN ARAUJO [[id number]]   Best Call Back Number: [phone]  Inquiry/Question: PLEASE CALL STIVEN ARAUJO [[id number]] REGARDING med refill concerns      Thank you~

## 2022-09-19 NOTE — TELEPHONE ENCOUNTER
----- Message from Christophe Quan sent at 9/19/2022  2:29 PM CDT -----  Regarding: returning call  Contact: patient  Type:  Patient Returning Call    Who Called:  patient   Who Left Message for Patient:  notsure  Does the patient know what this is regarding?:  no  Best Call Back Number:  [phone] (home)

## 2022-09-20 ENCOUNTER — TELEPHONE (OUTPATIENT)
Dept: CARDIOLOGY | Facility: CLINIC | Age: 77
End: 2022-09-20
Payer: MEDICARE

## 2022-09-20 NOTE — TELEPHONE ENCOUNTER
----- Message from Kalin Walters MA sent at 9/20/2022  2:13 PM CDT -----  Contact: STIVEN ARAUJO [[id number]]  Type: Needs Medical Advice    Who Called:STIVEN ARAUJO [[id number]]   Best Call Back Number: [phone]  Inquiry/Question: Please call  regarding med concerns      Thank you~

## 2022-09-21 NOTE — TELEPHONE ENCOUNTER
S/w pt & she needs to an prior auth for ntg spray.  She can not take the ntg pills- caused a rash. Advised will take care of it
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
## 2022-09-26 DIAGNOSIS — E55.9 VITAMIN D DEFICIENCY: ICD-10-CM

## 2022-09-26 DIAGNOSIS — I87.8 VENOUS STASIS: Primary | ICD-10-CM

## 2022-09-26 RX ORDER — ERGOCALCIFEROL 1.25 MG/1
50000 CAPSULE ORAL
Qty: 12 CAPSULE | Refills: 1 | Status: SHIPPED | OUTPATIENT
Start: 2022-09-26 | End: 2023-04-24 | Stop reason: SDUPTHER

## 2022-09-26 NOTE — TELEPHONE ENCOUNTER
----- Message from Hanh Beck MA sent at 9/26/2022 10:29 AM CDT -----  [facility] calling from [facility] for an authorization on the pt vitamin D. # [phone]

## 2022-09-26 NOTE — TELEPHONE ENCOUNTER
"Per Dr. Dwyer "[facility] wheelchair cushion DME. Vitamin D 50,000 IU q 7 days #12 1 refill."   "

## 2022-09-26 NOTE — TELEPHONE ENCOUNTER
Spoke with [facility] who wants a prescription for Vitamin D 2 once weekly to the pharmacy - states she has been taking this ( I don't see it on her list)     Also states we were suppose to be getting a wheelchair cushion for her    Printed.

## 2022-09-28 ENCOUNTER — LAB VISIT (OUTPATIENT)
Dept: LAB | Facility: HOSPITAL | Age: 77
End: 2022-09-28
Attending: NURSE PRACTITIONER
Payer: MEDICARE

## 2022-09-28 DIAGNOSIS — N18.32 CHRONIC KIDNEY DISEASE (CKD) STAGE G3B/A1, MODERATELY DECREASED GLOMERULAR FILTRATION RATE (GFR) BETWEEN 30-44 ML/MIN/1.73 SQUARE METER AND ALBUMINURIA CREATININE RATIO LESS THAN 30 MG/G: ICD-10-CM

## 2022-09-28 DIAGNOSIS — N18.6 TYPE 2 DIABETES MELLITUS WITH END-STAGE RENAL DISEASE: Primary | ICD-10-CM

## 2022-09-28 DIAGNOSIS — E11.22 TYPE 2 DIABETES MELLITUS WITH END-STAGE RENAL DISEASE: Primary | ICD-10-CM

## 2022-09-28 LAB
ALBUMIN SERPL BCP-MCNC: 3.9 G/DL (ref 3.5–5.2)
ALP SERPL-CCNC: 97 U/L (ref 55–135)
ALT SERPL W/O P-5'-P-CCNC: 13 U/L (ref 10–44)
ANION GAP SERPL CALC-SCNC: 13 MMOL/L (ref 8–16)
AST SERPL-CCNC: 19 U/L (ref 10–40)
BASOPHILS # BLD AUTO: 0.04 K/UL (ref 0–0.2)
BASOPHILS NFR BLD: 0.6 % (ref 0–1.9)
BILIRUB SERPL-MCNC: 0.4 MG/DL (ref 0.1–1)
BUN SERPL-MCNC: 36 MG/DL (ref 8–23)
CALCIUM SERPL-MCNC: 9.9 MG/DL (ref 8.7–10.5)
CHLORIDE SERPL-SCNC: 103 MMOL/L (ref 95–110)
CO2 SERPL-SCNC: 27 MMOL/L (ref 23–29)
CREAT SERPL-MCNC: 1.9 MG/DL (ref 0.5–1.4)
CRP SERPL-MCNC: 0.5 MG/L (ref 0–8.2)
DIFFERENTIAL METHOD: ABNORMAL
EOSINOPHIL # BLD AUTO: 0.1 K/UL (ref 0–0.5)
EOSINOPHIL NFR BLD: 1.5 % (ref 0–8)
ERYTHROCYTE [DISTWIDTH] IN BLOOD BY AUTOMATED COUNT: 14.7 % (ref 11.5–14.5)
ERYTHROCYTE [SEDIMENTATION RATE] IN BLOOD BY WESTERGREN METHOD: 10 MM/HR (ref 0–20)
EST. GFR  (NO RACE VARIABLE): 27 ML/MIN/1.73 M^2
GLUCOSE SERPL-MCNC: 97 MG/DL (ref 70–110)
HCT VFR BLD AUTO: 41.1 % (ref 37–48.5)
HGB BLD-MCNC: 12.6 G/DL (ref 12–16)
IMM GRANULOCYTES # BLD AUTO: 0.01 K/UL (ref 0–0.04)
IMM GRANULOCYTES NFR BLD AUTO: 0.2 % (ref 0–0.5)
LYMPHOCYTES # BLD AUTO: 1.1 K/UL (ref 1–4.8)
LYMPHOCYTES NFR BLD: 17.4 % (ref 18–48)
MCH RBC QN AUTO: 29.3 PG (ref 27–31)
MCHC RBC AUTO-ENTMCNC: 30.7 G/DL (ref 32–36)
MCV RBC AUTO: 96 FL (ref 82–98)
MONOCYTES # BLD AUTO: 0.4 K/UL (ref 0.3–1)
MONOCYTES NFR BLD: 6.2 % (ref 4–15)
NEUTROPHILS # BLD AUTO: 4.8 K/UL (ref 1.8–7.7)
NEUTROPHILS NFR BLD: 74.1 % (ref 38–73)
NRBC BLD-RTO: 0 /100 WBC
PLATELET # BLD AUTO: 188 K/UL (ref 150–450)
PMV BLD AUTO: 12.1 FL (ref 9.2–12.9)
POTASSIUM SERPL-SCNC: 4.8 MMOL/L (ref 3.5–5.1)
PREALB SERPL-MCNC: 31 MG/DL (ref 20–43)
PROT SERPL-MCNC: 7.5 G/DL (ref 6–8.4)
RBC # BLD AUTO: 4.3 M/UL (ref 4–5.4)
SODIUM SERPL-SCNC: 143 MMOL/L (ref 136–145)
WBC # BLD AUTO: 6.5 K/UL (ref 3.9–12.7)

## 2022-09-28 PROCEDURE — 85651 RBC SED RATE NONAUTOMATED: CPT | Performed by: NURSE PRACTITIONER

## 2022-09-28 PROCEDURE — 36415 COLL VENOUS BLD VENIPUNCTURE: CPT | Performed by: NURSE PRACTITIONER

## 2022-09-28 PROCEDURE — 80053 COMPREHEN METABOLIC PANEL: CPT | Performed by: NURSE PRACTITIONER

## 2022-09-28 PROCEDURE — 85025 COMPLETE CBC W/AUTO DIFF WBC: CPT | Performed by: NURSE PRACTITIONER

## 2022-09-28 PROCEDURE — 86140 C-REACTIVE PROTEIN: CPT | Performed by: NURSE PRACTITIONER

## 2022-09-28 PROCEDURE — 84134 ASSAY OF PREALBUMIN: CPT | Performed by: NURSE PRACTITIONER

## 2022-09-29 ENCOUNTER — PATIENT OUTREACH (OUTPATIENT)
Dept: HOME HEALTH SERVICES | Facility: HOSPITAL | Age: 77
End: 2022-09-29
Payer: MEDICARE

## 2022-09-30 ENCOUNTER — EXTERNAL HOME HEALTH (OUTPATIENT)
Dept: HOME HEALTH SERVICES | Facility: HOSPITAL | Age: 77
End: 2022-09-30
Payer: MEDICARE

## 2022-10-01 PROCEDURE — G0179 MD RECERTIFICATION HHA PT: HCPCS | Mod: ,,, | Performed by: FAMILY MEDICINE

## 2022-10-01 PROCEDURE — G0179 PR HOME HEALTH MD RECERTIFICATION: ICD-10-PCS | Mod: ,,, | Performed by: FAMILY MEDICINE

## 2022-10-04 DIAGNOSIS — I25.118 CORONARY ARTERY DISEASE OF NATIVE ARTERY OF NATIVE HEART WITH STABLE ANGINA PECTORIS: ICD-10-CM

## 2022-10-04 RX ORDER — ISOSORBIDE MONONITRATE 120 MG/1
120 TABLET, EXTENDED RELEASE ORAL DAILY
Qty: 90 TABLET | Refills: 1 | Status: SHIPPED | OUTPATIENT
Start: 2022-10-04 | End: 2022-12-21 | Stop reason: SDUPTHER

## 2022-10-04 NOTE — TELEPHONE ENCOUNTER
----- Message from Hanh Beck MA sent at 10/4/2022  3:05 PM CDT -----  Pt calling for a refill on her isosorbide to be sent to [facility] on [address]. Pt is also asking for for refills on any other medication she may need.# [phone]

## 2022-10-07 ENCOUNTER — DOCUMENT SCAN (OUTPATIENT)
Dept: HOME HEALTH SERVICES | Facility: HOSPITAL | Age: 77
End: 2022-10-07
Payer: MEDICARE

## 2022-10-13 ENCOUNTER — TELEPHONE (OUTPATIENT)
Dept: FAMILY MEDICINE | Facility: CLINIC | Age: 77
End: 2022-10-13

## 2022-10-13 NOTE — TELEPHONE ENCOUNTER
----- Message from Kimberly Root sent at 10/13/2022  4:47 PM CDT -----  Dr. Dwyer's Monday appointments had to be rescheduled. He will be out . The patient only wants to see Dr. Dwyer. Please call for a work in appointment. Marisol kramer daughter #  [phone] GH

## 2022-10-20 ENCOUNTER — TELEPHONE (OUTPATIENT)
Dept: FAMILY MEDICINE | Facility: CLINIC | Age: 77
End: 2022-10-20

## 2022-10-20 NOTE — TELEPHONE ENCOUNTER
Received forms from Genetic testing.     Per Dr. Dwyer, this is not going to be covered with Insurance. Please contact pt and let her know.     LMOR for pt to call back.

## 2022-10-21 ENCOUNTER — TELEPHONE (OUTPATIENT)
Dept: FAMILY MEDICINE | Facility: CLINIC | Age: 77
End: 2022-10-21

## 2022-10-21 NOTE — TELEPHONE ENCOUNTER
Spoke with pt in regards to recent Genetic testing orders. Verbalized per Dr. Dwyer that these labs are not covered by me medication ins. Pt acknowledge understanding. Pt states that at this time she dose not want to get the Genetic testing.

## 2022-10-26 ENCOUNTER — LAB VISIT (OUTPATIENT)
Dept: LAB | Facility: HOSPITAL | Age: 77
End: 2022-10-26
Attending: FAMILY MEDICINE
Payer: MEDICARE

## 2022-10-26 DIAGNOSIS — N18.32 CHRONIC KIDNEY DISEASE (CKD) STAGE G3B/A1, MODERATELY DECREASED GLOMERULAR FILTRATION RATE (GFR) BETWEEN 30-44 ML/MIN/1.73 SQUARE METER AND ALBUMINURIA CREATININE RATIO LESS THAN 30 MG/G: ICD-10-CM

## 2022-10-26 DIAGNOSIS — I13.0 HYPERTENSIVE HEART AND RENAL DISEASE WITH CONGESTIVE HEART FAILURE: Primary | ICD-10-CM

## 2022-10-26 DIAGNOSIS — E11.22 TYPE 2 DIABETES MELLITUS WITH END-STAGE RENAL DISEASE: ICD-10-CM

## 2022-10-26 DIAGNOSIS — I87.001: ICD-10-CM

## 2022-10-26 DIAGNOSIS — N18.6 TYPE 2 DIABETES MELLITUS WITH END-STAGE RENAL DISEASE: ICD-10-CM

## 2022-10-26 DIAGNOSIS — Z79.02 ENCOUNTER FOR LONG-TERM (CURRENT) USE OF ANTIPLATELETS/ANTITHROMBOTICS: ICD-10-CM

## 2022-10-26 LAB
ALBUMIN SERPL BCP-MCNC: 3.1 G/DL (ref 3.5–5.2)
ALP SERPL-CCNC: 188 U/L (ref 55–135)
ALT SERPL W/O P-5'-P-CCNC: 669 U/L (ref 10–44)
ANION GAP SERPL CALC-SCNC: 13 MMOL/L (ref 8–16)
AST SERPL-CCNC: 444 U/L (ref 10–40)
BASOPHILS # BLD AUTO: 0.03 K/UL (ref 0–0.2)
BASOPHILS NFR BLD: 0.3 % (ref 0–1.9)
BILIRUB SERPL-MCNC: 0.5 MG/DL (ref 0.1–1)
BUN SERPL-MCNC: 29 MG/DL (ref 8–23)
CALCIUM SERPL-MCNC: 9.3 MG/DL (ref 8.7–10.5)
CHLORIDE SERPL-SCNC: 100 MMOL/L (ref 95–110)
CO2 SERPL-SCNC: 29 MMOL/L (ref 23–29)
CREAT SERPL-MCNC: 1.7 MG/DL (ref 0.5–1.4)
CRP SERPL-MCNC: 132.4 MG/L (ref 0–8.2)
DIFFERENTIAL METHOD: ABNORMAL
EOSINOPHIL # BLD AUTO: 0.1 K/UL (ref 0–0.5)
EOSINOPHIL NFR BLD: 1.1 % (ref 0–8)
ERYTHROCYTE [DISTWIDTH] IN BLOOD BY AUTOMATED COUNT: 13.9 % (ref 11.5–14.5)
ERYTHROCYTE [SEDIMENTATION RATE] IN BLOOD BY WESTERGREN METHOD: 45 MM/HR (ref 0–20)
EST. GFR  (NO RACE VARIABLE): 31 ML/MIN/1.73 M^2
GLUCOSE SERPL-MCNC: 108 MG/DL (ref 70–110)
HCT VFR BLD AUTO: 37.3 % (ref 37–48.5)
HGB BLD-MCNC: 11.3 G/DL (ref 12–16)
IMM GRANULOCYTES # BLD AUTO: 0.03 K/UL (ref 0–0.04)
IMM GRANULOCYTES NFR BLD AUTO: 0.3 % (ref 0–0.5)
LYMPHOCYTES # BLD AUTO: 0.6 K/UL (ref 1–4.8)
LYMPHOCYTES NFR BLD: 6.6 % (ref 18–48)
MCH RBC QN AUTO: 29.5 PG (ref 27–31)
MCHC RBC AUTO-ENTMCNC: 30.3 G/DL (ref 32–36)
MCV RBC AUTO: 97 FL (ref 82–98)
MONOCYTES # BLD AUTO: 0.5 K/UL (ref 0.3–1)
MONOCYTES NFR BLD: 4.7 % (ref 4–15)
NEUTROPHILS # BLD AUTO: 8.3 K/UL (ref 1.8–7.7)
NEUTROPHILS NFR BLD: 87 % (ref 38–73)
NRBC BLD-RTO: 0 /100 WBC
PLATELET # BLD AUTO: 156 K/UL (ref 150–450)
PMV BLD AUTO: 12.1 FL (ref 9.2–12.9)
POTASSIUM SERPL-SCNC: 4.2 MMOL/L (ref 3.5–5.1)
PREALB SERPL-MCNC: 22 MG/DL (ref 20–43)
PROT SERPL-MCNC: 6.4 G/DL (ref 6–8.4)
RBC # BLD AUTO: 3.83 M/UL (ref 4–5.4)
SODIUM SERPL-SCNC: 142 MMOL/L (ref 136–145)
WBC # BLD AUTO: 9.5 K/UL (ref 3.9–12.7)

## 2022-10-26 PROCEDURE — 83036 HEMOGLOBIN GLYCOSYLATED A1C: CPT | Performed by: FAMILY MEDICINE

## 2022-10-26 PROCEDURE — 84134 ASSAY OF PREALBUMIN: CPT | Performed by: FAMILY MEDICINE

## 2022-10-26 PROCEDURE — 86140 C-REACTIVE PROTEIN: CPT | Performed by: FAMILY MEDICINE

## 2022-10-26 PROCEDURE — 85025 COMPLETE CBC W/AUTO DIFF WBC: CPT | Performed by: FAMILY MEDICINE

## 2022-10-26 PROCEDURE — 85651 RBC SED RATE NONAUTOMATED: CPT | Performed by: FAMILY MEDICINE

## 2022-10-26 PROCEDURE — 36415 COLL VENOUS BLD VENIPUNCTURE: CPT | Performed by: FAMILY MEDICINE

## 2022-10-26 PROCEDURE — 80053 COMPREHEN METABOLIC PANEL: CPT | Performed by: FAMILY MEDICINE

## 2022-10-27 LAB
ESTIMATED AVG GLUCOSE: 100 MG/DL (ref 68–131)
HBA1C MFR BLD: 5.1 % (ref 4–5.6)

## 2022-10-31 ENCOUNTER — OFFICE VISIT (OUTPATIENT)
Dept: PULMONOLOGY | Facility: CLINIC | Age: 77
End: 2022-10-31
Payer: MEDICARE

## 2022-10-31 ENCOUNTER — TELEPHONE (OUTPATIENT)
Dept: FAMILY MEDICINE | Facility: CLINIC | Age: 77
End: 2022-10-31

## 2022-10-31 VITALS
OXYGEN SATURATION: 99 % | BODY MASS INDEX: 36.05 KG/M2 | WEIGHT: 184.63 LBS | HEART RATE: 96 BPM | SYSTOLIC BLOOD PRESSURE: 148 MMHG | TEMPERATURE: 98 F | DIASTOLIC BLOOD PRESSURE: 68 MMHG

## 2022-10-31 DIAGNOSIS — J44.9 CHRONIC OBSTRUCTIVE PULMONARY DISEASE, UNSPECIFIED COPD TYPE: Primary | Chronic | ICD-10-CM

## 2022-10-31 DIAGNOSIS — I25.118 CORONARY ARTERY DISEASE OF NATIVE ARTERY OF NATIVE HEART WITH STABLE ANGINA PECTORIS: ICD-10-CM

## 2022-10-31 DIAGNOSIS — J96.11 CHRONIC RESPIRATORY FAILURE WITH HYPOXIA: ICD-10-CM

## 2022-10-31 PROCEDURE — 99214 PR OFFICE/OUTPT VISIT, EST, LEVL IV, 30-39 MIN: ICD-10-PCS | Mod: S$GLB,,, | Performed by: NURSE PRACTITIONER

## 2022-10-31 PROCEDURE — 99214 OFFICE O/P EST MOD 30 MIN: CPT | Mod: S$GLB,,, | Performed by: NURSE PRACTITIONER

## 2022-10-31 RX ORDER — ATORVASTATIN CALCIUM 80 MG/1
80 TABLET, FILM COATED ORAL NIGHTLY
Qty: 90 TABLET | Refills: 1 | Status: CANCELLED | OUTPATIENT
Start: 2022-10-31

## 2022-10-31 RX ORDER — IPRATROPIUM BROMIDE AND ALBUTEROL SULFATE 2.5; .5 MG/3ML; MG/3ML
3 SOLUTION RESPIRATORY (INHALATION) EVERY 6 HOURS PRN
Qty: 120 EACH | Refills: 6 | Status: ON HOLD | OUTPATIENT
Start: 2022-10-31 | End: 2023-01-10 | Stop reason: SDUPTHER

## 2022-10-31 NOTE — TELEPHONE ENCOUNTER
Spoke with pt and let her know to stop the atorvastatin and tylenol due to liver. Will discuss further at visit.

## 2022-10-31 NOTE — PROGRESS NOTES
SUBJECTIVE:    Patient ID: Marisol Kerr is a 77 y.o. female.    Chief Complaint: Follow-up (Follow up COPD)       Patient here today with her daughter. She has had a hard year of hospitalizations since February. Patient had an MI after angiogram, was transferred to [facility] main campus . She was in hospital and then rehab for several months.  She is using Advair and Incruse, however she states she feels like she is not getting medication at times.  Her inspiratory effort has worsened since her last being here in October of 2021.  She is wearing her oxygen all of the time.   Past Medical History:   Diagnosis Date    CAD (coronary artery disease)     Cancer     colon    CHF (congestive heart failure)     Colitis     Colon cancer     COPD (chronic obstructive pulmonary disease)     Decreased hearing     Diabetes mellitus     Diabetes mellitus, type 2     Hypercholesterolemia     Hypertension     Hypoxemia     Insomnia     Obesity     Osteoarthritis      Past Surgical History:   Procedure Laterality Date    ANGIOGRAM, CORONARY, WITH LEFT HEART CATHETERIZATION N/A 2/10/2022    Procedure: Angiogram, Coronary, with Left Heart Cath;  Surgeon: Cristian Benjamin MD;  Location: [facility] CATH/EP LAB;  Service: Cardiology;  Laterality: N/A;    CARDIAC CATHETERIZATION      CHOLECYSTECTOMY      COLECTOMY      CORONARY ANGIOPLASTY      CORONARY STENT PLACEMENT      EXTERNAL EAR SURGERY      EYE SURGERY      FLEXIBLE SIGMOIDOSCOPY N/A 9/19/2019    Procedure: SIGMOIDOSCOPY, FLEXIBLE;  Surgeon: Biju Kramer III, MD;  Location: [facility] ENDO;  Service: Endoscopy;  Laterality: N/A;    HYSTERECTOMY      partial     Family History   Problem Relation Age of Onset    Febrile seizures Mother     Heart failure Father         Social History:   Marital Status:   Occupation: Data Unavailable  Alcohol History:  reports current alcohol use.  Tobacco History:  reports that she quit smoking about 16 years ago. Her smoking use included  cigarettes. She started smoking about 58 years ago. She has a 150.00 pack-year smoking history. She has never used smokeless tobacco.  Drug History:  reports no history of drug use.    Review of patient's allergies indicates:   Allergen Reactions    Contrast media     Strawberries [strawberry] Hives and Itching       Current Outpatient Medications   Medication Sig Dispense Refill    acetaminophen (TYLENOL) 325 MG tablet Take 2 tablets (650 mg total) by mouth every 4 (four) hours as needed.  0    albuterol (VENTOLIN HFA) 90 mcg/actuation inhaler Inhale 2 puffs into the lungs every 6 (six) hours as needed for Wheezing or Shortness of Breath. Rescue 36 g 3    aspirin (ECOTRIN) 81 MG EC tablet Take 1 tablet (81 mg total) by mouth every morning. 90 tablet 3    atorvastatin (LIPITOR) 80 MG tablet Take 1 tablet (80 mg total) by mouth once daily. (Patient taking differently: Take 80 mg by mouth every evening.) 90 tablet 1    cholestyramine (QUESTRAN) 4 gram packet Take 1 packet (4 g total) by mouth 2 (two) times daily. 180 packet 3    coenzyme Q10 100 mg capsule Take 100 mg by mouth every morning.      colchicine (COLCRYS) 0.6 mg tablet Take 1/2 tablet by mouth every other day (Patient taking differently: Take 0.6 mg by mouth every other day. Take 1/2 tablet by mouth every other day) 15 tablet 11    diltiaZEM (CARDIZEM CD) 120 MG Cp24 Take 1 capsule (120 mg total) by mouth once daily. 30 capsule 5    ergocalciferol (VITAMIN D2) 50,000 unit Cap Take 1 capsule (50,000 Units total) by mouth every 7 days. 12 capsule 1    fish oil-omega-3 fatty acids 300-1,000 mg capsule Take 2 capsules by mouth every morning.      fluticasone-salmeterol diskus inhaler 250-50 mcg Inhale 1 puff into the lungs 2 (two) times daily. 3 each 1    gabapentin (NEURONTIN) 100 MG capsule Take 1 capsule (100 mg total) by mouth every evening. 30 capsule 3    ipratropium-albuteroL (COMBIVENT RESPIMAT)  mcg/actuation inhaler Inhale 2 puffs into the  lungs every 4 (four) hours as needed for Shortness of Breath. Rescue 12 g 3    isosorbide mononitrate (IMDUR) 120 MG 24 hr tablet Take 1 tablet (120 mg total) by mouth once daily. 90 tablet 1    metoprolol succinate (TOPROL-XL) 50 MG 24 hr tablet Take 1 tablet (50 mg total) by mouth once daily. 90 tablet 1    nitroGLYCERIN 0.4 MG/DOSE TL SPRY (NITROLINGUAL) 400 mcg/spray spray Place 1 spray under the tongue every 5 (five) minutes as needed for Chest pain (max of 3 doses). 4.9 g 1    pantoprazole (PROTONIX) 40 MG tablet Take 1 tablet (40 mg total) by mouth once daily. 30 tablet 5    ranolazine (RANEXA) 500 MG Tb12 Take 1 tablet (500 mg total) by mouth 2 (two) times daily. 180 tablet 1    temazepam (RESTORIL) 15 mg Cap Take 1 capsule (15 mg total) by mouth once daily at 6am. 30 capsule 4    torsemide (DEMADEX) 10 MG Tab Take 1 tablet (10 mg total) by mouth once daily. 30 tablet 5    umeclidinium (INCRUSE ELLIPTA) 62.5 mcg/actuation inhalation capsule Inhale 62.5 mcg into the lungs every morning. Controller 30 each 11    vit C/E/Zn/coppr/lutein/zeaxan (PRESERVISION AREDS-2 ORAL) Take 1 tablet by mouth once daily.      albuterol-ipratropium (DUO-NEB) 2.5 mg-0.5 mg/3 mL nebulizer solution Take 3 mLs by nebulization every 6 (six) hours as needed for Wheezing. Rescue 120 each 6     No current facility-administered medications for this visit.         Last PFT:  2020  Last CT:10/2021     IMPRESSION:     1.  Interval increase in size and density of central right lower lobe nodule currently measuring 10 x 11 mm.  2.  There is 3 mm pulmonary nodule in the left upper lobe.  3.  Other pulmonary nodules are either unchanged or decreased in size.  4.  Unchanged emphysematous lung disease.    General: good and bad days., no fever, no night sweats  Eyes: Vision is good.  ENT:  No current issues    Heart:no current chest pain   : Lungs: breathing is stable, no cough, no hemoptysis   GI: Reflux   :  No issues   Musculoskeletal:  arthritic pain   Skin: chronic venous status   Neuro: No headaches or neuropathy.  Lymph:swelling to legs better  Psych: No anxiety or depression.  Endo: weight loss    OBJECTIVE:      BP (!) 148/68 (BP Location: Right arm, Patient Position: Sitting, BP Method: Medium (Manual))   Pulse 96   Temp 97.6 °F (36.4 °C)   Wt 83.7 kg (184 lb 9.6 oz)   SpO2 99%   BMI 36.05 kg/m²     Physical Exam  GENERAL: Older patient in no distress.  HEENT: Pupils equal and reactive. Extraocular movements intact. Nose intact.      Pharynx moist.    NECK: Supple.   HEART: RRR  LUNGS: quiet.  ABDOMEN: Bowel sounds present. Non-tender, no masses palpated.  EXTREMITIES: Normal muscle tone and joint movement, no cyanosis or clubbing.  wheelchair  LYMPHATICS: No adenopathy palpated, 1+ pitting edema to legs and feet  SKIN: chronic venous stasis to bilateral lower extremities   NEURO: Cranial nerves II-XII intact. Motor strength 5/5 bilaterally, upper and lower extremities.  PSYCH: Appropriate affect.    Assessment:       1. Chronic obstructive pulmonary disease, unspecified COPD type    2. Chronic respiratory failure with hypoxia          Severe emphysema   Plan:       Chronic obstructive pulmonary disease, unspecified COPD type    Chronic respiratory failure with hypoxia    Other orders  -     albuterol-ipratropium (DUO-NEB) 2.5 mg-0.5 mg/3 mL nebulizer solution; Take 3 mLs by nebulization every 6 (six) hours as needed for Wheezing. Rescue  Dispense: 120 each; Refill: 6       Brovana and Budesonide twice a day in nebulizer   Continue the Oxygen all the time.    Refill duonebs   Duoneb via nebulizer 3-4 times a day  Stop the advair and incruse once you get the all nebulized medication     Follow up in about 3 months (around 1/31/2023).

## 2022-10-31 NOTE — TELEPHONE ENCOUNTER
----- Message from Ronda Winter sent at 10/31/2022 11:42 AM CDT -----  Pt needs refill on her cholesterol medication atorvastatin   [facility]   [phone] or [phone]

## 2022-10-31 NOTE — TELEPHONE ENCOUNTER
----- Message from Khadar Dwyer MD sent at 10/30/2022  9:21 PM CDT -----  CALL PATIENT MONDAY.  Her liver enzymes are shameka high.  Is she having any nausea or throwing up.  CRP is high showing inflammation in the body.  Stop taking the atorvastatin and Tylenol to help the liver heal, we will discuss this at your office visit this week.

## 2022-10-31 NOTE — PROGRESS NOTES
CALL PATIENT MONDAY.  Her liver enzymes are shameka high.  Is she having any nausea or throwing up.  CRP is high showing inflammation in the body.  Stop taking the atorvastatin and Tylenol to help the liver heal, we will discuss this at your office visit this week.

## 2022-10-31 NOTE — PATIENT INSTRUCTIONS
Brovana and Budesonide twice a day in nebulizer   Continue the Oxygen all the time.    Refill duonebs   Duoneb via nebulizer 3-4 times a day  Stop the advair and incruse once you get the all nebulized medication     Follow up in about 3 months (around 1/31/2023).

## 2022-11-02 ENCOUNTER — TELEPHONE (OUTPATIENT)
Dept: FAMILY MEDICINE | Facility: CLINIC | Age: 77
End: 2022-11-02

## 2022-11-02 ENCOUNTER — LAB VISIT (OUTPATIENT)
Dept: LAB | Facility: HOSPITAL | Age: 77
End: 2022-11-02
Attending: FAMILY MEDICINE
Payer: MEDICARE

## 2022-11-02 DIAGNOSIS — E11.42 DIABETIC POLYNEUROPATHY: ICD-10-CM

## 2022-11-02 DIAGNOSIS — I87.2 PERIPHERAL VENOUS INSUFFICIENCY: ICD-10-CM

## 2022-11-02 DIAGNOSIS — I13.0 HYPERTENSIVE HEART AND RENAL DISEASE WITH CONGESTIVE HEART FAILURE: Primary | ICD-10-CM

## 2022-11-02 DIAGNOSIS — I50.32 CHRONIC DIASTOLIC HEART FAILURE: ICD-10-CM

## 2022-11-02 DIAGNOSIS — N18.6 TYPE 2 DIABETES MELLITUS WITH END-STAGE RENAL DISEASE: ICD-10-CM

## 2022-11-02 DIAGNOSIS — E11.22 TYPE 2 DIABETES MELLITUS WITH END-STAGE RENAL DISEASE: ICD-10-CM

## 2022-11-02 LAB
ALBUMIN SERPL BCP-MCNC: 3.1 G/DL (ref 3.5–5.2)
ALP SERPL-CCNC: 122 U/L (ref 55–135)
ALT SERPL W/O P-5'-P-CCNC: 62 U/L (ref 10–44)
ANION GAP SERPL CALC-SCNC: 11 MMOL/L (ref 8–16)
AST SERPL-CCNC: 26 U/L (ref 10–40)
BASOPHILS # BLD AUTO: 0.05 K/UL (ref 0–0.2)
BASOPHILS NFR BLD: 0.6 % (ref 0–1.9)
BILIRUB SERPL-MCNC: 0.4 MG/DL (ref 0.1–1)
BUN SERPL-MCNC: 25 MG/DL (ref 8–23)
CALCIUM SERPL-MCNC: 9.5 MG/DL (ref 8.7–10.5)
CHLORIDE SERPL-SCNC: 104 MMOL/L (ref 95–110)
CO2 SERPL-SCNC: 28 MMOL/L (ref 23–29)
CREAT SERPL-MCNC: 1.8 MG/DL (ref 0.5–1.4)
CRP SERPL-MCNC: 7 MG/L (ref 0–8.2)
DIFFERENTIAL METHOD: ABNORMAL
EOSINOPHIL # BLD AUTO: 0.1 K/UL (ref 0–0.5)
EOSINOPHIL NFR BLD: 1.4 % (ref 0–8)
ERYTHROCYTE [DISTWIDTH] IN BLOOD BY AUTOMATED COUNT: 13.4 % (ref 11.5–14.5)
ERYTHROCYTE [SEDIMENTATION RATE] IN BLOOD BY WESTERGREN METHOD: 31 MM/HR (ref 0–20)
EST. GFR  (NO RACE VARIABLE): 29 ML/MIN/1.73 M^2
GLUCOSE SERPL-MCNC: 111 MG/DL (ref 70–110)
HCT VFR BLD AUTO: 38.1 % (ref 37–48.5)
HGB BLD-MCNC: 11.5 G/DL (ref 12–16)
IMM GRANULOCYTES # BLD AUTO: 0.04 K/UL (ref 0–0.04)
IMM GRANULOCYTES NFR BLD AUTO: 0.5 % (ref 0–0.5)
LYMPHOCYTES # BLD AUTO: 1.4 K/UL (ref 1–4.8)
LYMPHOCYTES NFR BLD: 18.1 % (ref 18–48)
MCH RBC QN AUTO: 29.6 PG (ref 27–31)
MCHC RBC AUTO-ENTMCNC: 30.2 G/DL (ref 32–36)
MCV RBC AUTO: 98 FL (ref 82–98)
MONOCYTES # BLD AUTO: 0.7 K/UL (ref 0.3–1)
MONOCYTES NFR BLD: 8.5 % (ref 4–15)
NEUTROPHILS # BLD AUTO: 5.6 K/UL (ref 1.8–7.7)
NEUTROPHILS NFR BLD: 70.9 % (ref 38–73)
NRBC BLD-RTO: 0 /100 WBC
PLATELET # BLD AUTO: 249 K/UL (ref 150–450)
PMV BLD AUTO: 11.6 FL (ref 9.2–12.9)
POTASSIUM SERPL-SCNC: 5.3 MMOL/L (ref 3.5–5.1)
PREALB SERPL-MCNC: 21 MG/DL (ref 20–43)
PROT SERPL-MCNC: 6.9 G/DL (ref 6–8.4)
RBC # BLD AUTO: 3.89 M/UL (ref 4–5.4)
SODIUM SERPL-SCNC: 143 MMOL/L (ref 136–145)
WBC # BLD AUTO: 7.9 K/UL (ref 3.9–12.7)

## 2022-11-02 PROCEDURE — 83036 HEMOGLOBIN GLYCOSYLATED A1C: CPT | Performed by: FAMILY MEDICINE

## 2022-11-02 PROCEDURE — 85651 RBC SED RATE NONAUTOMATED: CPT | Performed by: FAMILY MEDICINE

## 2022-11-02 PROCEDURE — 80053 COMPREHEN METABOLIC PANEL: CPT | Performed by: FAMILY MEDICINE

## 2022-11-02 PROCEDURE — 36415 COLL VENOUS BLD VENIPUNCTURE: CPT | Performed by: FAMILY MEDICINE

## 2022-11-02 PROCEDURE — 84134 ASSAY OF PREALBUMIN: CPT | Performed by: FAMILY MEDICINE

## 2022-11-02 PROCEDURE — 85025 COMPLETE CBC W/AUTO DIFF WBC: CPT | Performed by: FAMILY MEDICINE

## 2022-11-02 PROCEDURE — 86140 C-REACTIVE PROTEIN: CPT | Performed by: FAMILY MEDICINE

## 2022-11-02 NOTE — TELEPHONE ENCOUNTER
----- Message from Anni López sent at 11/2/2022 11:16 AM CDT -----  Sohail with [employer] [facility] [place] called and stated that he was checking to see if the form that was faxed over for the patient was received please give him a call back at [phone]

## 2022-11-03 ENCOUNTER — OFFICE VISIT (OUTPATIENT)
Dept: FAMILY MEDICINE | Facility: CLINIC | Age: 77
End: 2022-11-03
Payer: MEDICARE

## 2022-11-03 VITALS
SYSTOLIC BLOOD PRESSURE: 130 MMHG | DIASTOLIC BLOOD PRESSURE: 50 MMHG | WEIGHT: 184 LBS | BODY MASS INDEX: 36.12 KG/M2 | HEART RATE: 68 BPM | HEIGHT: 60 IN

## 2022-11-03 DIAGNOSIS — M15.9 PRIMARY OSTEOARTHRITIS INVOLVING MULTIPLE JOINTS: ICD-10-CM

## 2022-11-03 DIAGNOSIS — E11.42 DIABETIC PERIPHERAL NEUROPATHY: ICD-10-CM

## 2022-11-03 DIAGNOSIS — I10 PRIMARY HYPERTENSION: ICD-10-CM

## 2022-11-03 DIAGNOSIS — R74.8 ELEVATED LIVER ENZYMES: Primary | ICD-10-CM

## 2022-11-03 DIAGNOSIS — I50.32 CHRONIC DIASTOLIC CONGESTIVE HEART FAILURE: ICD-10-CM

## 2022-11-03 DIAGNOSIS — Z85.038 HISTORY OF COLON CANCER: ICD-10-CM

## 2022-11-03 DIAGNOSIS — E78.00 HYPERCHOLESTEROLEMIA: ICD-10-CM

## 2022-11-03 DIAGNOSIS — H91.90 HEARING LOSS, UNSPECIFIED HEARING LOSS TYPE, UNSPECIFIED LATERALITY: Chronic | ICD-10-CM

## 2022-11-03 DIAGNOSIS — J96.11 CHRONIC HYPOXEMIC RESPIRATORY FAILURE: ICD-10-CM

## 2022-11-03 DIAGNOSIS — Z74.09 IMPAIRED FUNCTIONAL MOBILITY AND ENDURANCE: ICD-10-CM

## 2022-11-03 DIAGNOSIS — E11.8 DM TYPE 2, CONTROLLED, WITH COMPLICATION: ICD-10-CM

## 2022-11-03 DIAGNOSIS — N20.0 NEPHROLITHIASIS: Chronic | ICD-10-CM

## 2022-11-03 DIAGNOSIS — I87.8 VENOUS STASIS: Chronic | ICD-10-CM

## 2022-11-03 DIAGNOSIS — I25.110 CORONARY ARTERY DISEASE INVOLVING NATIVE CORONARY ARTERY OF NATIVE HEART WITH UNSTABLE ANGINA PECTORIS: ICD-10-CM

## 2022-11-03 DIAGNOSIS — M16.0 PRIMARY OSTEOARTHRITIS OF BOTH HIPS: ICD-10-CM

## 2022-11-03 LAB
ESTIMATED AVG GLUCOSE: 94 MG/DL (ref 68–131)
HBA1C MFR BLD: 4.9 % (ref 4–5.6)

## 2022-11-03 PROCEDURE — G0008 ADMIN INFLUENZA VIRUS VAC: HCPCS | Mod: S$GLB,,, | Performed by: FAMILY MEDICINE

## 2022-11-03 PROCEDURE — 99214 OFFICE O/P EST MOD 30 MIN: CPT | Mod: 25,S$GLB,, | Performed by: FAMILY MEDICINE

## 2022-11-03 PROCEDURE — 90662 IIV NO PRSV INCREASED AG IM: CPT | Mod: S$GLB,,, | Performed by: FAMILY MEDICINE

## 2022-11-03 PROCEDURE — 90662 FLU VACCINE - QUADRIVALENT - HIGH DOSE (65+) PRESERVATIVE FREE IM: ICD-10-PCS | Mod: S$GLB,,, | Performed by: FAMILY MEDICINE

## 2022-11-03 PROCEDURE — 99214 PR OFFICE/OUTPT VISIT, EST, LEVL IV, 30-39 MIN: ICD-10-PCS | Mod: 25,S$GLB,, | Performed by: FAMILY MEDICINE

## 2022-11-03 PROCEDURE — G0008 FLU VACCINE - QUADRIVALENT - HIGH DOSE (65+) PRESERVATIVE FREE IM: ICD-10-PCS | Mod: S$GLB,,, | Performed by: FAMILY MEDICINE

## 2022-11-03 RX ORDER — TRAMADOL HYDROCHLORIDE 50 MG/1
50 TABLET ORAL 2 TIMES DAILY PRN
Qty: 30 TABLET | Refills: 0 | Status: ON HOLD | OUTPATIENT
Start: 2022-11-03 | End: 2023-01-19 | Stop reason: HOSPADM

## 2022-11-04 PROBLEM — R74.8 ELEVATED LIVER ENZYMES: Status: ACTIVE | Noted: 2022-11-04

## 2022-11-04 NOTE — PROGRESS NOTES
SUBJECTIVE:    Patient ID: Marisol Kerr is a 77 y.o. female.    Chief Complaint: Diabetes (No bottles, Foot exam, Flu vaccine  ordered, requested Eye exam Dr Shukla, abc )    77-year-old female was taken off of her cholesterol medicines and Tylenol due to elevated liver functions found on recent home health lab work.  Patient has been taking lovastatin, and 2 Tylenol as 3 times a day.    alk-phos [id number]   Surprisingly patient feels well has had no nausea vomiting, some fever blisters last week and some diarrhea.    Osteoarthritis-all over my body.  Taking the Tylenol 2 tablets t.i.d..    Pulmonary-Dr. Jayne Correa increased her nebulizer treatments to b.i.d. t.i.d.    February 2022 had 6 transfusions of blood.  Apparently had a retroperitoneal bleed after procedure.      Has chronic respiratory failure with COPD, maintain on 3.5 L O2 nasal cannula 24 hours a day.      Lab Visit on 11/02/2022   Component Date Value Ref Range Status    WBC 11/02/2022 7.90  3.90 - 12.70 K/uL Final    RBC 11/02/2022 3.89 (L)  4.00 - 5.40 M/uL Final    Hemoglobin 11/02/2022 11.5 (L)  12.0 - 16.0 g/dL Final    Hematocrit 11/02/2022 38.1  37.0 - 48.5 % Final    MCV 11/02/2022 98  82 - 98 fL Final    MCH 11/02/2022 29.6  27.0 - 31.0 pg Final    MCHC 11/02/2022 30.2 (L)  32.0 - 36.0 g/dL Final    RDW 11/02/2022 13.4  11.5 - 14.5 % Final    Platelets 11/02/2022 249  150 - 450 K/uL Final    MPV 11/02/2022 11.6  9.2 - 12.9 fL Final    Immature Granulocytes 11/02/2022 0.5  0.0 - 0.5 % Final    Gran # (ANC) 11/02/2022 5.6  1.8 - 7.7 K/uL Final    Immature Grans (Abs) 11/02/2022 0.04  0.00 - 0.04 K/uL Final    Lymph # 11/02/2022 1.4  1.0 - 4.8 K/uL Final    Mono # 11/02/2022 0.7  0.3 - 1.0 K/uL Final    Eos # 11/02/2022 0.1  0.0 - 0.5 K/uL Final    Baso # 11/02/2022 0.05  0.00 - 0.20 K/uL Final    nRBC 11/02/2022 0  0 /100 WBC Final    Gran % 11/02/2022 70.9  38.0 - 73.0 % Final    Lymph % 11/02/2022 18.1  18.0 - 48.0 % Final     Mono % 11/02/2022 8.5  4.0 - 15.0 % Final    Eosinophil % 11/02/2022 1.4  0.0 - 8.0 % Final    Basophil % 11/02/2022 0.6  0.0 - 1.9 % Final    Differential Method 11/02/2022 Automated   Final    Sodium 11/02/2022 143  136 - 145 mmol/L Final    Potassium 11/02/2022 5.3 (H)  3.5 - 5.1 mmol/L Final    Chloride 11/02/2022 104  95 - 110 mmol/L Final    CO2 11/02/2022 28  23 - 29 mmol/L Final    Glucose 11/02/2022 111 (H)  70 - 110 mg/dL Final    BUN 11/02/2022 25 (H)  8 - 23 mg/dL Final    Creatinine 11/02/2022 1.8 (H)  0.5 - 1.4 mg/dL Final    Calcium 11/02/2022 9.5  8.7 - 10.5 mg/dL Final    Total Protein 11/02/2022 6.9  6.0 - 8.4 g/dL Final    Albumin 11/02/2022 3.1 (L)  3.5 - 5.2 g/dL Final    Total Bilirubin 11/02/2022 0.4  0.1 - 1.0 mg/dL Final    Alkaline Phosphatase 11/02/2022 122  55 - 135 U/L Final    AST 11/02/2022 26  10 - 40 U/L Final    ALT 11/02/2022 62 (H)  10 - 44 U/L Final    Anion Gap 11/02/2022 11  8 - 16 mmol/L Final    eGFR 11/02/2022 29 (A)  >60 mL/min/1.73 m^2 Final    CRP 11/02/2022 7.0  0.0 - 8.2 mg/L Final    Hemoglobin A1C 11/02/2022 4.9  4.0 - 5.6 % Final    Estimated Avg Glucose 11/02/2022 94  68 - 131 mg/dL Final    Prealbumin 11/02/2022 21  20 - 43 mg/dL Final    Sed Rate 11/02/2022 31 (H)  0 - 20 mm/Hr Final   Lab Visit on 10/26/2022   Component Date Value Ref Range Status    WBC 10/26/2022 9.50  3.90 - 12.70 K/uL Final    RBC 10/26/2022 3.83 (L)  4.00 - 5.40 M/uL Final    Hemoglobin 10/26/2022 11.3 (L)  12.0 - 16.0 g/dL Final    Hematocrit 10/26/2022 37.3  37.0 - 48.5 % Final    MCV 10/26/2022 97  82 - 98 fL Final    MCH 10/26/2022 29.5  27.0 - 31.0 pg Final    MCHC 10/26/2022 30.3 (L)  32.0 - 36.0 g/dL Final    RDW 10/26/2022 13.9  11.5 - 14.5 % Final    Platelets 10/26/2022 156  150 - 450 K/uL Final    MPV 10/26/2022 12.1  9.2 - 12.9 fL Final    Immature Granulocytes 10/26/2022 0.3  0.0 - 0.5 % Final    Gran # (ANC) 10/26/2022 8.3 (H)  1.8 - 7.7 K/uL Final    Immature Grans (Abs)  10/26/2022 0.03  0.00 - 0.04 K/uL Final    Lymph # 10/26/2022 0.6 (L)  1.0 - 4.8 K/uL Final    Mono # 10/26/2022 0.5  0.3 - 1.0 K/uL Final    Eos # 10/26/2022 0.1  0.0 - 0.5 K/uL Final    Baso # 10/26/2022 0.03  0.00 - 0.20 K/uL Final    nRBC 10/26/2022 0  0 /100 WBC Final    Gran % 10/26/2022 87.0 (H)  38.0 - 73.0 % Final    Lymph % 10/26/2022 6.6 (L)  18.0 - 48.0 % Final    Mono % 10/26/2022 4.7  4.0 - 15.0 % Final    Eosinophil % 10/26/2022 1.1  0.0 - 8.0 % Final    Basophil % 10/26/2022 0.3  0.0 - 1.9 % Final    Differential Method 10/26/2022 Automated   Final    Sodium 10/26/2022 142  136 - 145 mmol/L Final    Potassium 10/26/2022 4.2  3.5 - 5.1 mmol/L Final    Chloride 10/26/2022 100  95 - 110 mmol/L Final    CO2 10/26/2022 29  23 - 29 mmol/L Final    Glucose 10/26/2022 108  70 - 110 mg/dL Final    BUN 10/26/2022 29 (H)  8 - 23 mg/dL Final    Creatinine 10/26/2022 1.7 (H)  0.5 - 1.4 mg/dL Final    Calcium 10/26/2022 9.3  8.7 - 10.5 mg/dL Final    Total Protein 10/26/2022 6.4  6.0 - 8.4 g/dL Final    Albumin 10/26/2022 3.1 (L)  3.5 - 5.2 g/dL Final    Total Bilirubin 10/26/2022 0.5  0.1 - 1.0 mg/dL Final    Alkaline Phosphatase 10/26/2022 188 (H)  55 - 135 U/L Final    AST 10/26/2022 444 (H)  10 - 40 U/L Final    ALT 10/26/2022 669 (H)  10 - 44 U/L Final    Anion Gap 10/26/2022 13  8 - 16 mmol/L Final    eGFR 10/26/2022 31 (A)  >60 mL/min/1.73 m^2 Final    CRP 10/26/2022 132.4 (H)  0.0 - 8.2 mg/L Final    Sed Rate 10/26/2022 45 (H)  0 - 20 mm/Hr Final    Hemoglobin A1C 10/26/2022 5.1  4.0 - 5.6 % Final    Estimated Avg Glucose 10/26/2022 100  68 - 131 mg/dL Final    Prealbumin 10/26/2022 22  20 - 43 mg/dL Final   Lab Visit on 09/28/2022   Component Date Value Ref Range Status    WBC 09/28/2022 6.50  3.90 - 12.70 K/uL Final    RBC 09/28/2022 4.30  4.00 - 5.40 M/uL Final    Hemoglobin 09/28/2022 12.6  12.0 - 16.0 g/dL Final    Hematocrit 09/28/2022 41.1  37.0 - 48.5 % Final    MCV 09/28/2022 96  82 - 98 fL Final     MCH 09/28/2022 29.3  27.0 - 31.0 pg Final    MCHC 09/28/2022 30.7 (L)  32.0 - 36.0 g/dL Final    RDW 09/28/2022 14.7 (H)  11.5 - 14.5 % Final    Platelets 09/28/2022 188  150 - 450 K/uL Final    MPV 09/28/2022 12.1  9.2 - 12.9 fL Final    Immature Granulocytes 09/28/2022 0.2  0.0 - 0.5 % Final    Gran # (ANC) 09/28/2022 4.8  1.8 - 7.7 K/uL Final    Immature Grans (Abs) 09/28/2022 0.01  0.00 - 0.04 K/uL Final    Lymph # 09/28/2022 1.1  1.0 - 4.8 K/uL Final    Mono # 09/28/2022 0.4  0.3 - 1.0 K/uL Final    Eos # 09/28/2022 0.1  0.0 - 0.5 K/uL Final    Baso # 09/28/2022 0.04  0.00 - 0.20 K/uL Final    nRBC 09/28/2022 0  0 /100 WBC Final    Gran % 09/28/2022 74.1 (H)  38.0 - 73.0 % Final    Lymph % 09/28/2022 17.4 (L)  18.0 - 48.0 % Final    Mono % 09/28/2022 6.2  4.0 - 15.0 % Final    Eosinophil % 09/28/2022 1.5  0.0 - 8.0 % Final    Basophil % 09/28/2022 0.6  0.0 - 1.9 % Final    Differential Method 09/28/2022 Automated   Final    Sodium 09/28/2022 143  136 - 145 mmol/L Final    Potassium 09/28/2022 4.8  3.5 - 5.1 mmol/L Final    Chloride 09/28/2022 103  95 - 110 mmol/L Final    CO2 09/28/2022 27  23 - 29 mmol/L Final    Glucose 09/28/2022 97  70 - 110 mg/dL Final    BUN 09/28/2022 36 (H)  8 - 23 mg/dL Final    Creatinine 09/28/2022 1.9 (H)  0.5 - 1.4 mg/dL Final    Calcium 09/28/2022 9.9  8.7 - 10.5 mg/dL Final    Total Protein 09/28/2022 7.5  6.0 - 8.4 g/dL Final    Albumin 09/28/2022 3.9  3.5 - 5.2 g/dL Final    Total Bilirubin 09/28/2022 0.4  0.1 - 1.0 mg/dL Final    Alkaline Phosphatase 09/28/2022 97  55 - 135 U/L Final    AST 09/28/2022 19  10 - 40 U/L Final    ALT 09/28/2022 13  10 - 44 U/L Final    Anion Gap 09/28/2022 13  8 - 16 mmol/L Final    eGFR 09/28/2022 27 (A)  >60 mL/min/1.73 m^2 Final    CRP 09/28/2022 0.5  0.0 - 8.2 mg/L Final    Sed Rate 09/28/2022 10  0 - 20 mm/Hr Final    Prealbumin 09/28/2022 31  20 - 43 mg/dL Final   Lab Visit on 08/26/2022   Component Date Value Ref Range Status    WBC  08/26/2022 6.61  3.90 - 12.70 K/uL Final    RBC 08/26/2022 3.72 (L)  4.00 - 5.40 M/uL Final    Hemoglobin 08/26/2022 10.9 (L)  12.0 - 16.0 g/dL Final    Hematocrit 08/26/2022 35.5 (L)  37.0 - 48.5 % Final    MCV 08/26/2022 95  82 - 98 fL Final    MCH 08/26/2022 29.3  27.0 - 31.0 pg Final    MCHC 08/26/2022 30.7 (L)  32.0 - 36.0 g/dL Final    RDW 08/26/2022 14.6 (H)  11.5 - 14.5 % Final    Platelets 08/26/2022 168  150 - 450 K/uL Final    MPV 08/26/2022 11.9  9.2 - 12.9 fL Final    Immature Granulocytes 08/26/2022 0.3  0.0 - 0.5 % Final    Gran # (ANC) 08/26/2022 4.4  1.8 - 7.7 K/uL Final    Immature Grans (Abs) 08/26/2022 0.02  0.00 - 0.04 K/uL Final    Lymph # 08/26/2022 1.4  1.0 - 4.8 K/uL Final    Mono # 08/26/2022 0.6  0.3 - 1.0 K/uL Final    Eos # 08/26/2022 0.1  0.0 - 0.5 K/uL Final    Baso # 08/26/2022 0.04  0.00 - 0.20 K/uL Final    nRBC 08/26/2022 0  0 /100 WBC Final    Gran % 08/26/2022 66.9  38.0 - 73.0 % Final    Lymph % 08/26/2022 21.8  18.0 - 48.0 % Final    Mono % 08/26/2022 8.3  4.0 - 15.0 % Final    Eosinophil % 08/26/2022 2.1  0.0 - 8.0 % Final    Basophil % 08/26/2022 0.6  0.0 - 1.9 % Final    Differential Method 08/26/2022 Automated   Final    Sodium 08/26/2022 142  136 - 145 mmol/L Final    Potassium 08/26/2022 5.3 (H)  3.5 - 5.1 mmol/L Final    Chloride 08/26/2022 105  95 - 110 mmol/L Final    CO2 08/26/2022 28  23 - 29 mmol/L Final    Glucose 08/26/2022 86  70 - 110 mg/dL Final    BUN 08/26/2022 31 (H)  8 - 23 mg/dL Final    Creatinine 08/26/2022 1.7 (H)  0.5 - 1.4 mg/dL Final    Calcium 08/26/2022 9.2  8.7 - 10.5 mg/dL Final    Total Protein 08/26/2022 6.1  6.0 - 8.4 g/dL Final    Albumin 08/26/2022 3.1 (L)  3.5 - 5.2 g/dL Final    Total Bilirubin 08/26/2022 0.3  0.1 - 1.0 mg/dL Final    Alkaline Phosphatase 08/26/2022 87  55 - 135 U/L Final    AST 08/26/2022 17  10 - 40 U/L Final    ALT 08/26/2022 17  10 - 44 U/L Final    Anion Gap 08/26/2022 9  8 - 16 mmol/L Final    eGFR 08/26/2022 31 (A)   >60 mL/min/1.73 m^2 Final    CRP 08/26/2022 9.8 (H)  0.0 - 8.2 mg/L Final    Hemoglobin A1C 08/26/2022 5.0  4.0 - 5.6 % Final    Estimated Avg Glucose 08/26/2022 97  68 - 131 mg/dL Final    Prealbumin 08/26/2022 24  20 - 43 mg/dL Final    Sed Rate 08/26/2022 10  0 - 20 mm/Hr Final   Admission on 06/29/2022, Discharged on 06/30/2022   Component Date Value Ref Range Status    WBC 06/29/2022 8.51  3.90 - 12.70 K/uL Final    RBC 06/29/2022 3.79 (L)  4.00 - 5.40 M/uL Final    Hemoglobin 06/29/2022 10.6 (L)  12.0 - 16.0 g/dL Final    Hematocrit 06/29/2022 34.8 (L)  37.0 - 48.5 % Final    MCV 06/29/2022 92  82 - 98 fL Final    MCH 06/29/2022 28.0  27.0 - 31.0 pg Final    MCHC 06/29/2022 30.5 (L)  32.0 - 36.0 g/dL Final    RDW 06/29/2022 14.4  11.5 - 14.5 % Final    Platelets 06/29/2022 209  150 - 450 K/uL Final    MPV 06/29/2022 11.4  9.2 - 12.9 fL Final    Immature Granulocytes 06/29/2022 0.4  0.0 - 0.5 % Final    Gran # (ANC) 06/29/2022 6.4  1.8 - 7.7 K/uL Final    Immature Grans (Abs) 06/29/2022 0.03  0.00 - 0.04 K/uL Final    Lymph # 06/29/2022 1.2  1.0 - 4.8 K/uL Final    Mono # 06/29/2022 0.6  0.3 - 1.0 K/uL Final    Eos # 06/29/2022 0.1  0.0 - 0.5 K/uL Final    Baso # 06/29/2022 0.05  0.00 - 0.20 K/uL Final    nRBC 06/29/2022 0  0 /100 WBC Final    Gran % 06/29/2022 75.5 (H)  38.0 - 73.0 % Final    Lymph % 06/29/2022 14.6 (L)  18.0 - 48.0 % Final    Mono % 06/29/2022 7.3  4.0 - 15.0 % Final    Eosinophil % 06/29/2022 1.6  0.0 - 8.0 % Final    Basophil % 06/29/2022 0.6  0.0 - 1.9 % Final    Differential Method 06/29/2022 Automated   Final    Sodium 06/29/2022 141  136 - 145 mmol/L Final    Potassium 06/29/2022 4.5  3.5 - 5.1 mmol/L Final    Chloride 06/29/2022 103  95 - 110 mmol/L Final    CO2 06/29/2022 31 (H)  23 - 29 mmol/L Final    Glucose 06/29/2022 126 (H)  70 - 110 mg/dL Final    BUN 06/29/2022 33 (H)  8 - 23 mg/dL Final    Creatinine 06/29/2022 1.5 (H)  0.5 - 1.4 mg/dL Final    Calcium 06/29/2022 9.0  8.7 -  10.5 mg/dL Final    Total Protein 06/29/2022 6.3  6.0 - 8.4 g/dL Final    Albumin 06/29/2022 3.2 (L)  3.5 - 5.2 g/dL Final    Total Bilirubin 06/29/2022 0.6  0.1 - 1.0 mg/dL Final    Alkaline Phosphatase 06/29/2022 69  55 - 135 U/L Final    AST 06/29/2022 25  10 - 40 U/L Final    ALT 06/29/2022 17  10 - 44 U/L Final    Anion Gap 06/29/2022 7 (L)  8 - 16 mmol/L Final    eGFR if  06/29/2022 39.0 (A)  >60 mL/min/1.73 m^2 Final    eGFR if non  06/29/2022 33.8 (A)  >60 mL/min/1.73 m^2 Final    Troponin I 06/29/2022 0.030  <=0.040 ng/mL Final    BNP 06/29/2022 234 (H)  0 - 99 pg/mL Final    Troponin I 06/30/2022 0.036  <=0.040 ng/mL Final    SARS-CoV-2 RNA, Amplification, Qual 06/29/2022 Negative  Negative Final    Sodium 06/30/2022 140  136 - 145 mmol/L Final    Potassium 06/30/2022 4.3  3.5 - 5.1 mmol/L Final    Chloride 06/30/2022 101  95 - 110 mmol/L Final    CO2 06/30/2022 32 (H)  23 - 29 mmol/L Final    Glucose 06/30/2022 84  70 - 110 mg/dL Final    BUN 06/30/2022 30 (H)  8 - 23 mg/dL Final    Creatinine 06/30/2022 1.5 (H)  0.5 - 1.4 mg/dL Final    Calcium 06/30/2022 9.1  8.7 - 10.5 mg/dL Final    Anion Gap 06/30/2022 7 (L)  8 - 16 mmol/L Final    eGFR if  06/30/2022 39.0 (A)  >60 mL/min/1.73 m^2 Final    eGFR if non  06/30/2022 33.8 (A)  >60 mL/min/1.73 m^2 Final    Magnesium 06/30/2022 2.1  1.6 - 2.6 mg/dL Final    Troponin I 06/30/2022 0.049 (H)  <=0.040 ng/mL Final    WBC 06/30/2022 6.94  3.90 - 12.70 K/uL Final    RBC 06/30/2022 4.15  4.00 - 5.40 M/uL Final    Hemoglobin 06/30/2022 11.6 (L)  12.0 - 16.0 g/dL Final    Hematocrit 06/30/2022 38.2  37.0 - 48.5 % Final    MCV 06/30/2022 92  82 - 98 fL Final    MCH 06/30/2022 28.0  27.0 - 31.0 pg Final    MCHC 06/30/2022 30.4 (L)  32.0 - 36.0 g/dL Final    RDW 06/30/2022 14.4  11.5 - 14.5 % Final    Platelets 06/30/2022 211  150 - 450 K/uL Final    MPV 06/30/2022 11.2  9.2 - 12.9 fL Final    Immature  Granulocytes 06/30/2022 0.3  0.0 - 0.5 % Final    Gran # (ANC) 06/30/2022 4.8  1.8 - 7.7 K/uL Final    Immature Grans (Abs) 06/30/2022 0.02  0.00 - 0.04 K/uL Final    Lymph # 06/30/2022 1.4  1.0 - 4.8 K/uL Final    Mono # 06/30/2022 0.5  0.3 - 1.0 K/uL Final    Eos # 06/30/2022 0.1  0.0 - 0.5 K/uL Final    Baso # 06/30/2022 0.03  0.00 - 0.20 K/uL Final    nRBC 06/30/2022 0  0 /100 WBC Final    Gran % 06/30/2022 69.5  38.0 - 73.0 % Final    Lymph % 06/30/2022 20.5  18.0 - 48.0 % Final    Mono % 06/30/2022 7.6  4.0 - 15.0 % Final    Eosinophil % 06/30/2022 1.7  0.0 - 8.0 % Final    Basophil % 06/30/2022 0.4  0.0 - 1.9 % Final    Differential Method 06/30/2022 Automated   Final    Troponin I 06/30/2022 0.044 (H)  <=0.040 ng/mL Final    CRP 06/30/2022 0.09  <0.76 mg/dL Final   Lab Visit on 05/17/2022   Component Date Value Ref Range Status    WBC 05/17/2022 8.22  3.90 - 12.70 K/uL Final    RBC 05/17/2022 4.02  4.00 - 5.40 M/uL Final    Hemoglobin 05/17/2022 11.4 (L)  12.0 - 16.0 g/dL Final    Hematocrit 05/17/2022 37.9  37.0 - 48.5 % Final    MCV 05/17/2022 94  82 - 98 fL Final    MCH 05/17/2022 28.4  27.0 - 31.0 pg Final    MCHC 05/17/2022 30.1 (L)  32.0 - 36.0 g/dL Final    RDW 05/17/2022 13.3  11.5 - 14.5 % Final    Platelets 05/17/2022 188  150 - 450 K/uL Final    MPV 05/17/2022 12.2  9.2 - 12.9 fL Final    Immature Granulocytes 05/17/2022 0.4  0.0 - 0.5 % Final    Gran # (ANC) 05/17/2022 6.4  1.8 - 7.7 K/uL Final    Immature Grans (Abs) 05/17/2022 0.03  0.00 - 0.04 K/uL Final    Lymph # 05/17/2022 0.9 (L)  1.0 - 4.8 K/uL Final    Mono # 05/17/2022 0.7  0.3 - 1.0 K/uL Final    Eos # 05/17/2022 0.2  0.0 - 0.5 K/uL Final    Baso # 05/17/2022 0.03  0.00 - 0.20 K/uL Final    nRBC 05/17/2022 0  0 /100 WBC Final    Gran % 05/17/2022 77.2 (H)  38.0 - 73.0 % Final    Lymph % 05/17/2022 11.3 (L)  18.0 - 48.0 % Final    Mono % 05/17/2022 8.5  4.0 - 15.0 % Final    Eosinophil % 05/17/2022 2.2  0.0 - 8.0 % Final    Basophil %  2022 0.4  0.0 - 1.9 % Final    Differential Method 2022 Automated   Final    Sodium 2022 142  136 - 145 mmol/L Final    Potassium 2022 4.8  3.5 - 5.1 mmol/L Final    Chloride 2022 100  95 - 110 mmol/L Final    CO2 2022 30 (H)  23 - 29 mmol/L Final    Glucose 2022 103  70 - 110 mg/dL Final    BUN 2022 26 (H)  8 - 23 mg/dL Final    Creatinine 2022 1.5 (H)  0.5 - 1.4 mg/dL Final    Calcium 2022 8.9  8.7 - 10.5 mg/dL Final    Total Protein 2022 6.0  6.0 - 8.4 g/dL Final    Albumin 2022 3.0 (L)  3.5 - 5.2 g/dL Final    Total Bilirubin 2022 0.4  0.1 - 1.0 mg/dL Final    Alkaline Phosphatase 2022 86  55 - 135 U/L Final    AST 2022 14  10 - 40 U/L Final    ALT 2022 9 (L)  10 - 44 U/L Final    Anion Gap 2022 12  8 - 16 mmol/L Final    eGFR if  2022 39.3 (A)  >60 mL/min/1.73 m^2 Final    eGFR if non  2022 34.1 (A)  >60 mL/min/1.73 m^2 Final    BNP 2022 111 (H)  0 - 99 pg/mL Final       Past Medical History:   Diagnosis Date    CAD (coronary artery disease)     Cancer     colon    CHF (congestive heart failure)     Colitis     Colon cancer     COPD (chronic obstructive pulmonary disease)     Decreased hearing     Diabetes mellitus     Diabetes mellitus, type 2     Hypercholesterolemia     Hypertension     Hypoxemia     Insomnia     Obesity     Osteoarthritis      Social History     Socioeconomic History    Marital status:    Tobacco Use    Smoking status: Former     Packs/day: 3.00     Years: 50.00     Pack years: 150.00     Types: Cigarettes     Start date: 3/30/1964     Quit date: 2006     Years since quittin.6    Smokeless tobacco: Never    Tobacco comments:     ex smoker 15 years   Substance and Sexual Activity    Alcohol use: Yes    Drug use: No    Sexual activity: Never     Past Surgical History:   Procedure Laterality Date    ANGIOGRAM, CORONARY, WITH  LEFT HEART CATHETERIZATION N/A 2/10/2022    Procedure: Angiogram, Coronary, with Left Heart Cath;  Surgeon: Cristian Benjamin MD;  Location: [facility] CATH/EP LAB;  Service: Cardiology;  Laterality: N/A;    CARDIAC CATHETERIZATION      CHOLECYSTECTOMY      COLECTOMY      CORONARY ANGIOPLASTY      CORONARY STENT PLACEMENT      EXTERNAL EAR SURGERY      EYE SURGERY      FLEXIBLE SIGMOIDOSCOPY N/A 9/19/2019    Procedure: SIGMOIDOSCOPY, FLEXIBLE;  Surgeon: Biju Kramer III, MD;  Location: [facility] ENDO;  Service: Endoscopy;  Laterality: N/A;    HYSTERECTOMY      partial     Family History   Problem Relation Age of Onset    Febrile seizures Mother     Heart failure Father        Review of patient's allergies indicates:   Allergen Reactions    Iodinated contrast media     Strawberries [strawberry] Hives and Itching       Current Outpatient Medications:     albuterol (VENTOLIN HFA) 90 mcg/actuation inhaler, Inhale 2 puffs into the lungs every 6 (six) hours as needed for Wheezing or Shortness of Breath. Rescue, Disp: 36 g, Rfl: 3    albuterol-ipratropium (DUO-NEB) 2.5 mg-0.5 mg/3 mL nebulizer solution, Take 3 mLs by nebulization every 6 (six) hours as needed for Wheezing. Rescue, Disp: 120 each, Rfl: 6    aspirin (ECOTRIN) 81 MG EC tablet, Take 1 tablet (81 mg total) by mouth every morning., Disp: 90 tablet, Rfl: 3    cholestyramine (QUESTRAN) 4 gram packet, Take 1 packet (4 g total) by mouth 2 (two) times daily., Disp: 180 packet, Rfl: 3    coenzyme Q10 100 mg capsule, Take 100 mg by mouth every morning., Disp: , Rfl:     colchicine (COLCRYS) 0.6 mg tablet, Take 1/2 tablet by mouth every other day (Patient taking differently: Take 0.6 mg by mouth every other day. Take 1/2 tablet by mouth every other day), Disp: 15 tablet, Rfl: 11    diltiaZEM (CARDIZEM CD) 120 MG Cp24, Take 1 capsule (120 mg total) by mouth once daily., Disp: 30 capsule, Rfl: 5    ergocalciferol (VITAMIN D2) 50,000 unit Cap, Take 1 capsule (50,000 Units  total) by mouth every 7 days., Disp: 12 capsule, Rfl: 1    fish oil-omega-3 fatty acids 300-1,000 mg capsule, Take 2 capsules by mouth every morning., Disp: , Rfl:     fluticasone-salmeterol diskus inhaler 250-50 mcg, Inhale 1 puff into the lungs 2 (two) times daily., Disp: 3 each, Rfl: 1    gabapentin (NEURONTIN) 100 MG capsule, Take 1 capsule (100 mg total) by mouth every evening., Disp: 30 capsule, Rfl: 3    ipratropium-albuteroL (COMBIVENT RESPIMAT)  mcg/actuation inhaler, Inhale 2 puffs into the lungs every 4 (four) hours as needed for Shortness of Breath. Rescue, Disp: 12 g, Rfl: 3    isosorbide mononitrate (IMDUR) 120 MG 24 hr tablet, Take 1 tablet (120 mg total) by mouth once daily., Disp: 90 tablet, Rfl: 1    metoprolol succinate (TOPROL-XL) 50 MG 24 hr tablet, Take 1 tablet (50 mg total) by mouth once daily., Disp: 90 tablet, Rfl: 1    nitroGLYCERIN 0.4 MG/DOSE TL SPRY (NITROLINGUAL) 400 mcg/spray spray, Place 1 spray under the tongue every 5 (five) minutes as needed for Chest pain (max of 3 doses)., Disp: 4.9 g, Rfl: 1    pantoprazole (PROTONIX) 40 MG tablet, Take 1 tablet (40 mg total) by mouth once daily., Disp: 30 tablet, Rfl: 5    ranolazine (RANEXA) 500 MG Tb12, Take 1 tablet (500 mg total) by mouth 2 (two) times daily., Disp: 180 tablet, Rfl: 1    temazepam (RESTORIL) 15 mg Cap, Take 1 capsule (15 mg total) by mouth once daily at 6am., Disp: 30 capsule, Rfl: 4    torsemide (DEMADEX) 10 MG Tab, Take 1 tablet (10 mg total) by mouth once daily., Disp: 30 tablet, Rfl: 5    umeclidinium (INCRUSE ELLIPTA) 62.5 mcg/actuation inhalation capsule, Inhale 62.5 mcg into the lungs every morning. Controller, Disp: 30 each, Rfl: 11    vit C/E/Zn/coppr/lutein/zeaxan (PRESERVISION AREDS-2 ORAL), Take 1 tablet by mouth once daily., Disp: , Rfl:     traMADoL (ULTRAM) 50 mg tablet, Take 1 tablet (50 mg total) by mouth 2 (two) times daily as needed for Pain., Disp: 30 tablet, Rfl: 0    Review of Systems    Constitutional:  Negative for appetite change, chills, fatigue, fever and unexpected weight change.   HENT:  Negative for congestion, ear pain, sinus pain, sore throat and trouble swallowing.    Eyes:  Negative for pain, discharge and visual disturbance.   Respiratory:  Negative for apnea, cough, shortness of breath and wheezing.         Chronic respiratory failure, on O2 3.5 L nasal cannula   Cardiovascular:  Negative for chest pain, palpitations and leg swelling.   Gastrointestinal:  Positive for nausea. Negative for abdominal pain, blood in stool, constipation, diarrhea and vomiting.   Endocrine: Negative for heat intolerance, polydipsia and polyuria.   Genitourinary:  Negative for difficulty urinating, dyspareunia, dysuria, frequency, hematuria and menstrual problem.   Musculoskeletal:  Negative for arthralgias, back pain, gait problem, joint swelling and myalgias.   Skin:         Dear blisters on upper and lower lip   Allergic/Immunologic: Negative for environmental allergies, food allergies and immunocompromised state.   Neurological:  Negative for dizziness, tremors, seizures, numbness and headaches.   Psychiatric/Behavioral:  Negative for behavioral problems, confusion, hallucinations and suicidal ideas. The patient is not nervous/anxious.         Objective:      Vitals:    11/03/22 1557 11/03/22 1602   BP: (!) 136/50 (!) 130/50   Pulse: 68    Weight: 83.5 kg (184 lb)    Height: 5' (1.524 m)      Physical Exam  Vitals and nursing note reviewed.   Constitutional:       General: She is not in acute distress.     Appearance: She is well-developed. She is not toxic-appearing.      Comments: Elderly obese female with oxygen sitting in a wheelchair   HENT:      Head: Normocephalic and atraumatic.      Right Ear: Tympanic membrane and external ear normal.      Left Ear: Tympanic membrane and external ear normal.      Nose: Nose normal.      Mouth/Throat:      Pharynx: Oropharynx is clear.   Eyes:      Pupils:  Pupils are equal, round, and reactive to light.   Neck:      Thyroid: No thyromegaly.      Vascular: No carotid bruit.   Cardiovascular:      Rate and Rhythm: Normal rate and regular rhythm.      Heart sounds: Normal heart sounds. No murmur heard.  Pulmonary:      Effort: Pulmonary effort is normal.      Breath sounds: Normal breath sounds. No wheezing or rales.   Abdominal:      General: Bowel sounds are normal. There is no distension.      Palpations: Abdomen is soft.      Tenderness: There is no abdominal tenderness.   Musculoskeletal:         General: No tenderness or deformity. Normal range of motion.      Cervical back: Normal range of motion and neck supple.      Lumbar back: Normal. No spasms.      Comments: Bends 90 degrees at  waist, shoulders and knees good range of motion, trace pedal edema bilaterally   Lymphadenopathy:      Cervical: No cervical adenopathy.   Skin:     General: Skin is warm and dry.      Findings: No rash.   Neurological:      General: No focal deficit present.      Mental Status: She is alert and oriented to person, place, and time.      Cranial Nerves: No cranial nerve deficit.      Coordination: Coordination normal.   Psychiatric:         Behavior: Behavior normal.         Thought Content: Thought content normal.         Judgment: Judgment normal.         Assessment:       1. Elevated liver enzymes    2. Primary osteoarthritis involving multiple joints    3. Primary hypertension    4. Coronary artery disease involving native coronary artery of native heart with unstable angina pectoris    5. Chronic diastolic congestive heart failure    6. Chronic hypoxemic respiratory failure    7. Hearing loss, unspecified hearing loss type, unspecified laterality    8. Diabetic peripheral neuropathy    9. Hypercholesterolemia    10. Venous stasis    11. Bilateral nonobstructing nephrolithiasis    12. History of colon cancer    13. DM type 2, controlled, with complication    14. Primary  osteoarthritis of both hips    15. Impaired functional mobility and endurance           Plan:       Elevated liver enzymes  -     Ambulatory referral/consult to Home Health; Future; Expected date: 11/04/2022  Repeat liver enzymes done recently showed improvement in out losses down to 122 AST 26 ALT 62.  This is a dramatic improvement.  In makes us suspect a possible lab error for the abnormal that were so high.  We will repeat another home health blood work of CMP in 2 weeks.  Patient feels well and does not appear to be sick or having a liver issue.    Primary osteoarthritis involving multiple joints  -     traMADoL (ULTRAM) 50 mg tablet; Take 1 tablet (50 mg total) by mouth 2 (two) times daily as needed for Pain.  Dispense: 30 tablet; Refill: 0  Tramadol refilled  Primary hypertension  Blood pressure at goal today.  Coronary artery disease involving native coronary artery of native heart with unstable angina pectoris    Chronic diastolic congestive heart failure    Chronic hypoxemic respiratory failure  Tolerating 3.5 L O2 nasal cannula  Hearing loss, unspecified hearing loss type, unspecified laterality    Diabetic peripheral neuropathy    Hypercholesterolemia    Venous stasis    Bilateral nonobstructing nephrolithiasis    History of colon cancer    DM type 2, controlled, with complication  A1c 4.9 excellent control diabetes  Primary osteoarthritis of both hips    Impaired functional mobility and endurance    Other orders  -     Influenza - Quadrivalent - High Dose (65+) (PF) (IM)  Flu shot today.  Follow up in about 3 months (around 2/3/2023), or [place]- copd.        11/4/2022 Khadar Dwyer

## 2022-11-14 ENCOUNTER — TELEPHONE (OUTPATIENT)
Dept: FAMILY MEDICINE | Facility: CLINIC | Age: 77
End: 2022-11-14

## 2022-11-14 NOTE — TELEPHONE ENCOUNTER
----- Message from Anni López sent at 11/14/2022  9:49 AM CST -----  Keron Clement with [employer] testing [facility] calling to see if the office received a form on the patient for testing please give him a call back at [phone]

## 2022-11-16 ENCOUNTER — LAB VISIT (OUTPATIENT)
Dept: LAB | Facility: HOSPITAL | Age: 77
End: 2022-11-16
Attending: FAMILY MEDICINE
Payer: MEDICARE

## 2022-11-16 DIAGNOSIS — R74.8 ACID PHOSPHATASE ELEVATED: Primary | ICD-10-CM

## 2022-11-16 LAB
ALBUMIN SERPL BCP-MCNC: 3.3 G/DL (ref 3.5–5.2)
ALP SERPL-CCNC: 109 U/L (ref 55–135)
ALT SERPL W/O P-5'-P-CCNC: 20 U/L (ref 10–44)
ANION GAP SERPL CALC-SCNC: 10 MMOL/L (ref 8–16)
AST SERPL-CCNC: 21 U/L (ref 10–40)
BILIRUB SERPL-MCNC: 0.3 MG/DL (ref 0.1–1)
BUN SERPL-MCNC: 29 MG/DL (ref 8–23)
CALCIUM SERPL-MCNC: 9.5 MG/DL (ref 8.7–10.5)
CHLORIDE SERPL-SCNC: 103 MMOL/L (ref 95–110)
CO2 SERPL-SCNC: 28 MMOL/L (ref 23–29)
CREAT SERPL-MCNC: 1.8 MG/DL (ref 0.5–1.4)
EST. GFR  (NO RACE VARIABLE): 29 ML/MIN/1.73 M^2
GLUCOSE SERPL-MCNC: 114 MG/DL (ref 70–110)
POTASSIUM SERPL-SCNC: 5.2 MMOL/L (ref 3.5–5.1)
PROT SERPL-MCNC: 6.8 G/DL (ref 6–8.4)
SODIUM SERPL-SCNC: 141 MMOL/L (ref 136–145)

## 2022-11-16 PROCEDURE — 80053 COMPREHEN METABOLIC PANEL: CPT | Performed by: FAMILY MEDICINE

## 2022-11-21 ENCOUNTER — TELEPHONE (OUTPATIENT)
Dept: FAMILY MEDICINE | Facility: CLINIC | Age: 77
End: 2022-11-21

## 2022-11-21 NOTE — PROGRESS NOTES
Call patient's daughter.  Patient's liver enzymes are now completely back to normal.  Sugar potassium and kidneys seems stable also.  Continue current medications

## 2022-11-21 NOTE — TELEPHONE ENCOUNTER
----- Message from Kahdar Dwyer MD sent at 11/20/2022  9:28 PM CST -----  Call patient's daughter.  Patient's liver enzymes are now completely back to normal.  Sugar potassium and kidneys seems stable also.  Continue current medications

## 2022-11-23 RX ORDER — TRIAMCINOLONE ACETONIDE 1 MG/G
CREAM TOPICAL 2 TIMES DAILY
Qty: 453 G | Refills: 1 | Status: ON HOLD | OUTPATIENT
Start: 2022-11-23 | End: 2023-01-19 | Stop reason: HOSPADM

## 2022-11-30 PROCEDURE — G0179 PR HOME HEALTH MD RECERTIFICATION: ICD-10-PCS | Mod: ,,, | Performed by: FAMILY MEDICINE

## 2022-11-30 PROCEDURE — G0179 MD RECERTIFICATION HHA PT: HCPCS | Mod: ,,, | Performed by: FAMILY MEDICINE

## 2022-12-05 ENCOUNTER — TELEPHONE (OUTPATIENT)
Dept: FAMILY MEDICINE | Facility: CLINIC | Age: 77
End: 2022-12-05

## 2022-12-05 ENCOUNTER — EXTERNAL HOME HEALTH (OUTPATIENT)
Dept: HOME HEALTH SERVICES | Facility: HOSPITAL | Age: 77
End: 2022-12-05
Payer: MEDICARE

## 2022-12-05 ENCOUNTER — PATIENT OUTREACH (OUTPATIENT)
Dept: HOME HEALTH SERVICES | Facility: HOSPITAL | Age: 77
End: 2022-12-05

## 2022-12-05 NOTE — TELEPHONE ENCOUNTER
----- Message from Anni López sent at 12/5/2022 10:12 AM CST -----  Rajinder from [employer] [facility] called and stated that he has been calling and faxing information on this patient for about a month and he has not received any response please give him a call back at [phone]

## 2022-12-21 DIAGNOSIS — I25.118 CORONARY ARTERY DISEASE OF NATIVE ARTERY OF NATIVE HEART WITH STABLE ANGINA PECTORIS: ICD-10-CM

## 2022-12-21 DIAGNOSIS — K21.9 GASTROESOPHAGEAL REFLUX DISEASE WITHOUT ESOPHAGITIS: ICD-10-CM

## 2022-12-21 DIAGNOSIS — M10.9 GOUT, UNSPECIFIED CAUSE, UNSPECIFIED CHRONICITY, UNSPECIFIED SITE: Primary | ICD-10-CM

## 2022-12-21 DIAGNOSIS — I10 ESSENTIAL HYPERTENSION, BENIGN: ICD-10-CM

## 2022-12-21 RX ORDER — PANTOPRAZOLE SODIUM 40 MG/1
40 TABLET, DELAYED RELEASE ORAL DAILY
Qty: 90 TABLET | Refills: 1 | Status: SHIPPED | OUTPATIENT
Start: 2022-12-21 | End: 2023-06-27 | Stop reason: SDUPTHER

## 2022-12-21 RX ORDER — METOPROLOL SUCCINATE 50 MG/1
50 TABLET, EXTENDED RELEASE ORAL DAILY
Qty: 90 TABLET | Refills: 1 | Status: ON HOLD | OUTPATIENT
Start: 2022-12-21 | End: 2023-01-19 | Stop reason: HOSPADM

## 2022-12-21 RX ORDER — TORSEMIDE 10 MG/1
10 TABLET ORAL DAILY
Qty: 90 TABLET | Refills: 1 | Status: ON HOLD | OUTPATIENT
Start: 2022-12-21 | End: 2023-01-19 | Stop reason: HOSPADM

## 2022-12-21 RX ORDER — GABAPENTIN 100 MG/1
100 CAPSULE ORAL NIGHTLY
Qty: 90 CAPSULE | Refills: 1 | Status: ON HOLD | OUTPATIENT
Start: 2022-12-21 | End: 2023-02-02 | Stop reason: HOSPADM

## 2022-12-21 RX ORDER — DILTIAZEM HYDROCHLORIDE 120 MG/1
120 CAPSULE, COATED, EXTENDED RELEASE ORAL DAILY
Qty: 90 CAPSULE | Refills: 1 | Status: SHIPPED | OUTPATIENT
Start: 2022-12-21 | End: 2023-06-27 | Stop reason: SDUPTHER

## 2022-12-21 RX ORDER — ALLOPURINOL 100 MG/1
50 TABLET ORAL DAILY
Qty: 45 TABLET | Refills: 1 | Status: SHIPPED | OUTPATIENT
Start: 2022-12-21 | End: 2023-06-27 | Stop reason: SDUPTHER

## 2022-12-21 RX ORDER — ISOSORBIDE MONONITRATE 120 MG/1
120 TABLET, EXTENDED RELEASE ORAL DAILY
Qty: 90 TABLET | Refills: 1 | Status: SHIPPED | OUTPATIENT
Start: 2022-12-21 | End: 2023-06-27 | Stop reason: SDUPTHER

## 2022-12-21 RX ORDER — RANOLAZINE 500 MG/1
500 TABLET, EXTENDED RELEASE ORAL 2 TIMES DAILY
Qty: 180 TABLET | Refills: 1 | Status: SHIPPED | OUTPATIENT
Start: 2022-12-21 | End: 2023-04-24 | Stop reason: SDUPTHER

## 2022-12-21 NOTE — TELEPHONE ENCOUNTER
----- Message from La Posey MA sent at 12/21/2022  2:18 PM CST -----    ----- Message -----  From: Ronda Winter  Sent: 12/21/2022   2:14 PM CST  To: Khadar Dwyer Staff    - sudhir with [facility] is calling for refills and her bp was elevated today.     [phone]

## 2022-12-21 NOTE — TELEPHONE ENCOUNTER
Spoke to  nurse states pt is out of BP medications. States BP was 170/90 today. States pt was asymptomatic. Just need meds filled. States pt is normally 130/80s      Spoke to pt states Dr. Dwyer took her off all her medications for her heart due to her liver. Pt states she has not taken any BP medications since her last OV.    Pt states she has not been taking because she has been waiting on refills.     Diltiazem  Torsemide  Protonix      Pt is taking:    Allopurinol 100mg 0.5 tab daily  Ranolazine 500mg BID  Metoprolol ER 50mg daily.   Gabapentin 100 mg daily  Isosorbide 120 daily          Pt states she is not taking:     Colchicine      Med list updated. Rs set up. Pt denies any vision changes or headaches. States she does not have home BP cuff.  nurse, Aurora, is going to see pt Friday recheck BP and notify office. Allergies and pharm verified.   Rx set up for Dr. Dwyer. Please Review.

## 2023-01-05 ENCOUNTER — TELEPHONE (OUTPATIENT)
Dept: FAMILY MEDICINE | Facility: CLINIC | Age: 78
End: 2023-01-05

## 2023-01-05 NOTE — TELEPHONE ENCOUNTER
Pt states she is running a fever, bad productive, and body aches. Pt is not sure if she has covid or the flu. Pt advised to go to . Unable to send something in. She needs to be tested. Pt voiced understanding.

## 2023-01-05 NOTE — TELEPHONE ENCOUNTER
----- Message from Anni López sent at 1/5/2023  1:40 PM CST -----  VM 01/05/23 @ 1:17 PM :patient called and stated that she has body ache,runny nose, sore throat, fever, and has a cough she think she has the flu and she would [facility] if any questions please give her a call at [phone]

## 2023-01-06 ENCOUNTER — LAB VISIT (OUTPATIENT)
Dept: LAB | Facility: HOSPITAL | Age: 78
End: 2023-01-06
Payer: MEDICARE

## 2023-01-06 DIAGNOSIS — N18.6 TYPE 2 DIABETES MELLITUS WITH END-STAGE RENAL DISEASE: ICD-10-CM

## 2023-01-06 DIAGNOSIS — L89.153 STAGE III PRESSURE ULCER OF SACRAL REGION: Primary | ICD-10-CM

## 2023-01-06 DIAGNOSIS — E11.22 TYPE 2 DIABETES MELLITUS WITH END-STAGE RENAL DISEASE: ICD-10-CM

## 2023-01-06 DIAGNOSIS — I13.0 HYPERTENSIVE HEART AND RENAL DISEASE WITH CONGESTIVE HEART FAILURE: ICD-10-CM

## 2023-01-06 LAB
ALBUMIN SERPL BCP-MCNC: 3.5 G/DL (ref 3.5–5.2)
ALP SERPL-CCNC: 94 U/L (ref 55–135)
ALT SERPL W/O P-5'-P-CCNC: 7 U/L (ref 10–44)
ANION GAP SERPL CALC-SCNC: 10 MMOL/L (ref 8–16)
AST SERPL-CCNC: 14 U/L (ref 10–40)
BILIRUB SERPL-MCNC: 0.6 MG/DL (ref 0.1–1)
BUN SERPL-MCNC: 32 MG/DL (ref 8–23)
CALCIUM SERPL-MCNC: 9.5 MG/DL (ref 8.7–10.5)
CHLORIDE SERPL-SCNC: 97 MMOL/L (ref 95–110)
CO2 SERPL-SCNC: 31 MMOL/L (ref 23–29)
CREAT SERPL-MCNC: 1.6 MG/DL (ref 0.5–1.4)
CRP SERPL-MCNC: 135.5 MG/L (ref 0–8.2)
ERYTHROCYTE [SEDIMENTATION RATE] IN BLOOD BY WESTERGREN METHOD: 52 MM/HR (ref 0–20)
EST. GFR  (NO RACE VARIABLE): 33.4 ML/MIN/1.73 M^2
ESTIMATED AVG GLUCOSE: 91 MG/DL (ref 68–131)
GLUCOSE SERPL-MCNC: 117 MG/DL (ref 70–110)
HBA1C MFR BLD: 4.8 % (ref 4–5.6)
POTASSIUM SERPL-SCNC: 3.9 MMOL/L (ref 3.5–5.1)
PROT SERPL-MCNC: 7 G/DL (ref 6–8.4)
SODIUM SERPL-SCNC: 138 MMOL/L (ref 136–145)

## 2023-01-06 PROCEDURE — 84134 ASSAY OF PREALBUMIN: CPT

## 2023-01-06 PROCEDURE — 86140 C-REACTIVE PROTEIN: CPT

## 2023-01-06 PROCEDURE — 83036 HEMOGLOBIN GLYCOSYLATED A1C: CPT

## 2023-01-06 PROCEDURE — 85651 RBC SED RATE NONAUTOMATED: CPT | Mod: PO

## 2023-01-06 PROCEDURE — 80053 COMPREHEN METABOLIC PANEL: CPT

## 2023-01-06 PROCEDURE — 85025 COMPLETE CBC W/AUTO DIFF WBC: CPT

## 2023-01-07 LAB
BASOPHILS # BLD AUTO: 0.04 K/UL (ref 0–0.2)
BASOPHILS NFR BLD: 0.3 % (ref 0–1.9)
DIFFERENTIAL METHOD: ABNORMAL
EOSINOPHIL # BLD AUTO: 0.1 K/UL (ref 0–0.5)
EOSINOPHIL NFR BLD: 0.8 % (ref 0–8)
ERYTHROCYTE [DISTWIDTH] IN BLOOD BY AUTOMATED COUNT: 13.1 % (ref 11.5–14.5)
HCT VFR BLD AUTO: 40.2 % (ref 37–48.5)
HGB BLD-MCNC: 12 G/DL (ref 12–16)
IMM GRANULOCYTES # BLD AUTO: 0.04 K/UL (ref 0–0.04)
IMM GRANULOCYTES NFR BLD AUTO: 0.3 % (ref 0–0.5)
LYMPHOCYTES # BLD AUTO: 0.8 K/UL (ref 1–4.8)
LYMPHOCYTES NFR BLD: 6.3 % (ref 18–48)
MCH RBC QN AUTO: 29.5 PG (ref 27–31)
MCHC RBC AUTO-ENTMCNC: 29.9 G/DL (ref 32–36)
MCV RBC AUTO: 99 FL (ref 82–98)
MONOCYTES # BLD AUTO: 0.9 K/UL (ref 0.3–1)
MONOCYTES NFR BLD: 7.5 % (ref 4–15)
NEUTROPHILS # BLD AUTO: 10.1 K/UL (ref 1.8–7.7)
NEUTROPHILS NFR BLD: 84.8 % (ref 38–73)
NRBC BLD-RTO: 0 /100 WBC
PLATELET # BLD AUTO: 206 K/UL (ref 150–450)
PMV BLD AUTO: 12.1 FL (ref 9.2–12.9)
RBC # BLD AUTO: 4.07 M/UL (ref 4–5.4)
WBC # BLD AUTO: 11.92 K/UL (ref 3.9–12.7)

## 2023-01-09 ENCOUNTER — HOSPITAL ENCOUNTER (OUTPATIENT)
Facility: HOSPITAL | Age: 78
Discharge: HOME HEALTH | End: 2023-01-10
Attending: EMERGENCY MEDICINE | Admitting: INTERNAL MEDICINE
Payer: MEDICARE

## 2023-01-09 ENCOUNTER — TELEPHONE (OUTPATIENT)
Dept: FAMILY MEDICINE | Facility: CLINIC | Age: 78
End: 2023-01-09

## 2023-01-09 DIAGNOSIS — J44.1 COPD EXACERBATION: Primary | ICD-10-CM

## 2023-01-09 LAB
ALBUMIN SERPL BCP-MCNC: 3.1 G/DL (ref 3.5–5.2)
ALP SERPL-CCNC: 75 U/L (ref 55–135)
ALT SERPL W/O P-5'-P-CCNC: 14 U/L (ref 10–44)
AMPHET+METHAMPHET UR QL: NEGATIVE
ANION GAP SERPL CALC-SCNC: 9 MMOL/L (ref 8–16)
AST SERPL-CCNC: 20 U/L (ref 10–40)
BACTERIA #/AREA URNS HPF: ABNORMAL /HPF
BARBITURATES UR QL SCN>200 NG/ML: NEGATIVE
BASOPHILS # BLD AUTO: 0.07 K/UL (ref 0–0.2)
BASOPHILS NFR BLD: 0.6 % (ref 0–1.9)
BENZODIAZ UR QL SCN>200 NG/ML: ABNORMAL
BILIRUB SERPL-MCNC: 0.4 MG/DL (ref 0.1–1)
BILIRUB UR QL STRIP: NEGATIVE
BNP SERPL-MCNC: 232 PG/ML (ref 0–99)
BUN SERPL-MCNC: 27 MG/DL (ref 8–23)
BZE UR QL SCN: NEGATIVE
CALCIUM SERPL-MCNC: 8.9 MG/DL (ref 8.7–10.5)
CANNABINOIDS UR QL SCN: NEGATIVE
CHLORIDE SERPL-SCNC: 102 MMOL/L (ref 95–110)
CK SERPL-CCNC: 103 U/L (ref 20–180)
CLARITY UR: ABNORMAL
CO2 SERPL-SCNC: 28 MMOL/L (ref 23–29)
COLOR UR: YELLOW
CREAT SERPL-MCNC: 1.4 MG/DL (ref 0.5–1.4)
CREAT UR-MCNC: 37 MG/DL (ref 15–325)
DIFFERENTIAL METHOD: ABNORMAL
EOSINOPHIL # BLD AUTO: 0.1 K/UL (ref 0–0.5)
EOSINOPHIL NFR BLD: 0.8 % (ref 0–8)
ERYTHROCYTE [DISTWIDTH] IN BLOOD BY AUTOMATED COUNT: 12.7 % (ref 11.5–14.5)
EST. GFR  (NO RACE VARIABLE): 39.2 ML/MIN/1.73 M^2
GLUCOSE SERPL-MCNC: 122 MG/DL (ref 70–110)
GLUCOSE UR QL STRIP: NEGATIVE
HCT VFR BLD AUTO: 38.3 % (ref 37–48.5)
HGB BLD-MCNC: 11.7 G/DL (ref 12–16)
HGB UR QL STRIP: ABNORMAL
HYALINE CASTS #/AREA URNS LPF: 5 /LPF
IMM GRANULOCYTES # BLD AUTO: 0.16 K/UL (ref 0–0.04)
IMM GRANULOCYTES NFR BLD AUTO: 1.4 % (ref 0–0.5)
INFLUENZA A, MOLECULAR: NEGATIVE
INFLUENZA B, MOLECULAR: NEGATIVE
KETONES UR QL STRIP: NEGATIVE
LEUKOCYTE ESTERASE UR QL STRIP: NEGATIVE
LIPASE SERPL-CCNC: 41 U/L (ref 4–60)
LYMPHOCYTES # BLD AUTO: 1 K/UL (ref 1–4.8)
LYMPHOCYTES NFR BLD: 8.7 % (ref 18–48)
MAGNESIUM SERPL-MCNC: 1.9 MG/DL (ref 1.6–2.6)
MCH RBC QN AUTO: 29.3 PG (ref 27–31)
MCHC RBC AUTO-ENTMCNC: 30.5 G/DL (ref 32–36)
MCV RBC AUTO: 96 FL (ref 82–98)
MICROSCOPIC COMMENT: ABNORMAL
MONOCYTES # BLD AUTO: 0.8 K/UL (ref 0.3–1)
MONOCYTES NFR BLD: 6.7 % (ref 4–15)
NEUTROPHILS # BLD AUTO: 9.4 K/UL (ref 1.8–7.7)
NEUTROPHILS NFR BLD: 81.8 % (ref 38–73)
NITRITE UR QL STRIP: NEGATIVE
NRBC BLD-RTO: 0 /100 WBC
OPIATES UR QL SCN: NEGATIVE
PCP UR QL SCN>25 NG/ML: NEGATIVE
PH UR STRIP: 5 [PH] (ref 5–8)
PLATELET # BLD AUTO: 226 K/UL (ref 150–450)
PMV BLD AUTO: 11 FL (ref 9.2–12.9)
POTASSIUM SERPL-SCNC: 4.1 MMOL/L (ref 3.5–5.1)
PREALB SERPL-MCNC: 17 MG/DL (ref 20–43)
PROCALCITONIN SERPL IA-MCNC: 0.25 NG/ML (ref 0–0.5)
PROT SERPL-MCNC: 6.9 G/DL (ref 6–8.4)
PROT UR QL STRIP: ABNORMAL
RBC # BLD AUTO: 4 M/UL (ref 4–5.4)
RBC #/AREA URNS HPF: 4 /HPF (ref 0–4)
SARS-COV-2 RDRP RESP QL NAA+PROBE: NEGATIVE
SODIUM SERPL-SCNC: 139 MMOL/L (ref 136–145)
SP GR UR STRIP: 1.01 (ref 1–1.03)
SPECIMEN SOURCE: NORMAL
SQUAMOUS #/AREA URNS HPF: 3 /HPF
T4 FREE SERPL-MCNC: 0.97 NG/DL (ref 0.71–1.51)
TOXICOLOGY INFORMATION: ABNORMAL
TROPONIN I SERPL HS-MCNC: 23 PG/ML (ref 0–14.9)
TROPONIN I SERPL HS-MCNC: 23.3 PG/ML (ref 0–14.9)
TSH SERPL DL<=0.005 MIU/L-ACNC: 0.27 UIU/ML (ref 0.34–5.6)
URN SPEC COLLECT METH UR: ABNORMAL
UROBILINOGEN UR STRIP-ACNC: NEGATIVE EU/DL
WBC # BLD AUTO: 11.52 K/UL (ref 3.9–12.7)
WBC #/AREA URNS HPF: 4 /HPF (ref 0–5)

## 2023-01-09 PROCEDURE — 27000221 HC OXYGEN, UP TO 24 HOURS

## 2023-01-09 PROCEDURE — 93010 EKG 12-LEAD: ICD-10-PCS | Mod: ,,, | Performed by: INTERNAL MEDICINE

## 2023-01-09 PROCEDURE — 84439 ASSAY OF FREE THYROXINE: CPT | Performed by: EMERGENCY MEDICINE

## 2023-01-09 PROCEDURE — 94799 UNLISTED PULMONARY SVC/PX: CPT

## 2023-01-09 PROCEDURE — 85025 COMPLETE CBC W/AUTO DIFF WBC: CPT | Performed by: EMERGENCY MEDICINE

## 2023-01-09 PROCEDURE — 99900031 HC PATIENT EDUCATION (STAT)

## 2023-01-09 PROCEDURE — 82550 ASSAY OF CK (CPK): CPT | Performed by: EMERGENCY MEDICINE

## 2023-01-09 PROCEDURE — 25000242 PHARM REV CODE 250 ALT 637 W/ HCPCS: Performed by: INTERNAL MEDICINE

## 2023-01-09 PROCEDURE — 63600175 PHARM REV CODE 636 W HCPCS: Performed by: INTERNAL MEDICINE

## 2023-01-09 PROCEDURE — 96376 TX/PRO/DX INJ SAME DRUG ADON: CPT

## 2023-01-09 PROCEDURE — 80307 DRUG TEST PRSMV CHEM ANLYZR: CPT | Performed by: EMERGENCY MEDICINE

## 2023-01-09 PROCEDURE — 84484 ASSAY OF TROPONIN QUANT: CPT | Mod: 91 | Performed by: EMERGENCY MEDICINE

## 2023-01-09 PROCEDURE — 99900035 HC TECH TIME PER 15 MIN (STAT)

## 2023-01-09 PROCEDURE — 99285 EMERGENCY DEPT VISIT HI MDM: CPT | Mod: 25,CS

## 2023-01-09 PROCEDURE — 87502 INFLUENZA DNA AMP PROBE: CPT | Performed by: EMERGENCY MEDICINE

## 2023-01-09 PROCEDURE — 83690 ASSAY OF LIPASE: CPT | Performed by: EMERGENCY MEDICINE

## 2023-01-09 PROCEDURE — 25000242 PHARM REV CODE 250 ALT 637 W/ HCPCS: Performed by: EMERGENCY MEDICINE

## 2023-01-09 PROCEDURE — 63600175 PHARM REV CODE 636 W HCPCS: Performed by: EMERGENCY MEDICINE

## 2023-01-09 PROCEDURE — 93010 ELECTROCARDIOGRAM REPORT: CPT | Mod: ,,, | Performed by: INTERNAL MEDICINE

## 2023-01-09 PROCEDURE — 25000003 PHARM REV CODE 250: Performed by: INTERNAL MEDICINE

## 2023-01-09 PROCEDURE — 83735 ASSAY OF MAGNESIUM: CPT | Performed by: EMERGENCY MEDICINE

## 2023-01-09 PROCEDURE — 93005 ELECTROCARDIOGRAM TRACING: CPT | Performed by: INTERNAL MEDICINE

## 2023-01-09 PROCEDURE — 84443 ASSAY THYROID STIM HORMONE: CPT | Performed by: EMERGENCY MEDICINE

## 2023-01-09 PROCEDURE — 96374 THER/PROPH/DIAG INJ IV PUSH: CPT

## 2023-01-09 PROCEDURE — 81001 URINALYSIS AUTO W/SCOPE: CPT | Performed by: EMERGENCY MEDICINE

## 2023-01-09 PROCEDURE — 96372 THER/PROPH/DIAG INJ SC/IM: CPT | Mod: 59 | Performed by: INTERNAL MEDICINE

## 2023-01-09 PROCEDURE — 83880 ASSAY OF NATRIURETIC PEPTIDE: CPT | Performed by: EMERGENCY MEDICINE

## 2023-01-09 PROCEDURE — 94761 N-INVAS EAR/PLS OXIMETRY MLT: CPT

## 2023-01-09 PROCEDURE — 80053 COMPREHEN METABOLIC PANEL: CPT | Performed by: EMERGENCY MEDICINE

## 2023-01-09 PROCEDURE — 84145 PROCALCITONIN (PCT): CPT | Performed by: INTERNAL MEDICINE

## 2023-01-09 PROCEDURE — 94760 N-INVAS EAR/PLS OXIMETRY 1: CPT

## 2023-01-09 PROCEDURE — U0002 COVID-19 LAB TEST NON-CDC: HCPCS | Performed by: EMERGENCY MEDICINE

## 2023-01-09 PROCEDURE — 94644 CONT INHLJ TX 1ST HOUR: CPT | Mod: XB

## 2023-01-09 PROCEDURE — G0378 HOSPITAL OBSERVATION PER HR: HCPCS | Mod: CS

## 2023-01-09 PROCEDURE — G0378 HOSPITAL OBSERVATION PER HR: HCPCS

## 2023-01-09 PROCEDURE — 94640 AIRWAY INHALATION TREATMENT: CPT

## 2023-01-09 RX ORDER — DOXYCYCLINE 100 MG/1
100 CAPSULE ORAL EVERY 12 HOURS
Status: DISCONTINUED | OUTPATIENT
Start: 2023-01-09 | End: 2023-01-10 | Stop reason: HOSPADM

## 2023-01-09 RX ORDER — CHOLESTYRAMINE 4 G/4.8G
4 POWDER, FOR SUSPENSION ORAL 2 TIMES DAILY
Status: DISCONTINUED | OUTPATIENT
Start: 2023-01-09 | End: 2023-01-10 | Stop reason: HOSPADM

## 2023-01-09 RX ORDER — IPRATROPIUM BROMIDE 0.5 MG/2.5ML
0.5 SOLUTION RESPIRATORY (INHALATION) ONCE
Status: COMPLETED | OUTPATIENT
Start: 2023-01-09 | End: 2023-01-09

## 2023-01-09 RX ORDER — ARFORMOTEROL TARTRATE 15 UG/2ML
15 SOLUTION RESPIRATORY (INHALATION) 2 TIMES DAILY
Status: DISCONTINUED | OUTPATIENT
Start: 2023-01-10 | End: 2023-01-10 | Stop reason: HOSPADM

## 2023-01-09 RX ORDER — ENOXAPARIN SODIUM 100 MG/ML
40 INJECTION SUBCUTANEOUS EVERY 24 HOURS
Status: DISCONTINUED | OUTPATIENT
Start: 2023-01-09 | End: 2023-01-10 | Stop reason: HOSPADM

## 2023-01-09 RX ORDER — SODIUM CHLORIDE 0.9 % (FLUSH) 0.9 %
10 SYRINGE (ML) INJECTION EVERY 12 HOURS PRN
Status: DISCONTINUED | OUTPATIENT
Start: 2023-01-09 | End: 2023-01-10 | Stop reason: HOSPADM

## 2023-01-09 RX ORDER — GABAPENTIN 100 MG/1
100 CAPSULE ORAL NIGHTLY
Status: DISCONTINUED | OUTPATIENT
Start: 2023-01-09 | End: 2023-01-10 | Stop reason: HOSPADM

## 2023-01-09 RX ORDER — GUAIFENESIN 600 MG/1
600 TABLET, EXTENDED RELEASE ORAL 2 TIMES DAILY
Status: DISCONTINUED | OUTPATIENT
Start: 2023-01-09 | End: 2023-01-10 | Stop reason: HOSPADM

## 2023-01-09 RX ORDER — AMLODIPINE BESYLATE 5 MG/1
5 TABLET ORAL DAILY
Status: DISCONTINUED | OUTPATIENT
Start: 2023-01-10 | End: 2023-01-10 | Stop reason: HOSPADM

## 2023-01-09 RX ORDER — FLUTICASONE PROPIONATE 50 MCG
2 SPRAY, SUSPENSION (ML) NASAL 2 TIMES DAILY
Status: DISCONTINUED | OUTPATIENT
Start: 2023-01-09 | End: 2023-01-10 | Stop reason: HOSPADM

## 2023-01-09 RX ORDER — METOPROLOL SUCCINATE 50 MG/1
50 TABLET, EXTENDED RELEASE ORAL DAILY
Status: DISCONTINUED | OUTPATIENT
Start: 2023-01-10 | End: 2023-01-10 | Stop reason: HOSPADM

## 2023-01-09 RX ORDER — BUDESONIDE 0.5 MG/2ML
0.5 INHALANT ORAL EVERY 12 HOURS
Status: DISCONTINUED | OUTPATIENT
Start: 2023-01-10 | End: 2023-01-10 | Stop reason: HOSPADM

## 2023-01-09 RX ORDER — ONDANSETRON 2 MG/ML
4 INJECTION INTRAMUSCULAR; INTRAVENOUS EVERY 8 HOURS PRN
Status: DISCONTINUED | OUTPATIENT
Start: 2023-01-09 | End: 2023-01-10 | Stop reason: HOSPADM

## 2023-01-09 RX ORDER — CODEINE PHOSPHATE AND GUAIFENESIN 10; 100 MG/5ML; MG/5ML
10 SOLUTION ORAL EVERY 4 HOURS PRN
Status: DISCONTINUED | OUTPATIENT
Start: 2023-01-09 | End: 2023-01-10 | Stop reason: HOSPADM

## 2023-01-09 RX ORDER — NITROGLYCERIN 400 UG/1
1 SPRAY ORAL EVERY 5 MIN PRN
Status: DISCONTINUED | OUTPATIENT
Start: 2023-01-09 | End: 2023-01-10 | Stop reason: HOSPADM

## 2023-01-09 RX ORDER — PANTOPRAZOLE SODIUM 40 MG/1
40 TABLET, DELAYED RELEASE ORAL
Status: DISCONTINUED | OUTPATIENT
Start: 2023-01-10 | End: 2023-01-10 | Stop reason: HOSPADM

## 2023-01-09 RX ORDER — BUDESONIDE 0.5 MG/2ML
0.5 INHALANT ORAL ONCE
Status: COMPLETED | OUTPATIENT
Start: 2023-01-09 | End: 2023-01-09

## 2023-01-09 RX ORDER — LEVALBUTEROL INHALATION SOLUTION 0.63 MG/3ML
0.63 SOLUTION RESPIRATORY (INHALATION)
Status: COMPLETED | OUTPATIENT
Start: 2023-01-09 | End: 2023-01-09

## 2023-01-09 RX ORDER — ALBUTEROL SULFATE 90 UG/1
2 AEROSOL, METERED RESPIRATORY (INHALATION) EVERY 6 HOURS PRN
Status: DISCONTINUED | OUTPATIENT
Start: 2023-01-09 | End: 2023-01-09

## 2023-01-09 RX ORDER — LANOLIN ALCOHOL/MO/W.PET/CERES
800 CREAM (GRAM) TOPICAL
Status: DISCONTINUED | OUTPATIENT
Start: 2023-01-09 | End: 2023-01-10 | Stop reason: HOSPADM

## 2023-01-09 RX ORDER — ISOSORBIDE MONONITRATE 60 MG/1
120 TABLET, EXTENDED RELEASE ORAL DAILY
Status: DISCONTINUED | OUTPATIENT
Start: 2023-01-10 | End: 2023-01-10 | Stop reason: HOSPADM

## 2023-01-09 RX ORDER — ASPIRIN 81 MG/1
81 TABLET ORAL EVERY MORNING
Status: DISCONTINUED | OUTPATIENT
Start: 2023-01-10 | End: 2023-01-10 | Stop reason: HOSPADM

## 2023-01-09 RX ORDER — RANOLAZINE 500 MG/1
500 TABLET, EXTENDED RELEASE ORAL 2 TIMES DAILY
Status: DISCONTINUED | OUTPATIENT
Start: 2023-01-09 | End: 2023-01-10 | Stop reason: HOSPADM

## 2023-01-09 RX ORDER — FLUTICASONE FUROATE AND VILANTEROL 100; 25 UG/1; UG/1
1 POWDER RESPIRATORY (INHALATION) DAILY
Status: DISCONTINUED | OUTPATIENT
Start: 2023-01-10 | End: 2023-01-09

## 2023-01-09 RX ORDER — IPRATROPIUM BROMIDE AND ALBUTEROL SULFATE 2.5; .5 MG/3ML; MG/3ML
3 SOLUTION RESPIRATORY (INHALATION)
Status: DISCONTINUED | OUTPATIENT
Start: 2023-01-09 | End: 2023-01-10 | Stop reason: HOSPADM

## 2023-01-09 RX ORDER — FUROSEMIDE 40 MG/1
40 TABLET ORAL DAILY
Status: DISCONTINUED | OUTPATIENT
Start: 2023-01-10 | End: 2023-01-10 | Stop reason: HOSPADM

## 2023-01-09 RX ORDER — DILTIAZEM HYDROCHLORIDE 120 MG/1
120 CAPSULE, COATED, EXTENDED RELEASE ORAL DAILY
Status: DISCONTINUED | OUTPATIENT
Start: 2023-01-10 | End: 2023-01-10 | Stop reason: HOSPADM

## 2023-01-09 RX ORDER — ASPIRIN 325 MG
325 TABLET ORAL
Status: DISCONTINUED | OUTPATIENT
Start: 2023-01-09 | End: 2023-01-09

## 2023-01-09 RX ORDER — METHYLPREDNISOLONE SOD SUCC 125 MG
125 VIAL (EA) INJECTION
Status: COMPLETED | OUTPATIENT
Start: 2023-01-09 | End: 2023-01-09

## 2023-01-09 RX ADMIN — IPRATROPIUM BROMIDE 0.5 MG: 0.5 SOLUTION RESPIRATORY (INHALATION) at 02:01

## 2023-01-09 RX ADMIN — CHOLESTYRAMINE 4 G: 4 POWDER, FOR SUSPENSION ORAL at 09:01

## 2023-01-09 RX ADMIN — FLUTICASONE PROPIONATE 100 MCG: 50 SPRAY, METERED NASAL at 09:01

## 2023-01-09 RX ADMIN — RANOLAZINE 500 MG: 500 TABLET, EXTENDED RELEASE ORAL at 09:01

## 2023-01-09 RX ADMIN — GABAPENTIN 100 MG: 100 CAPSULE ORAL at 09:01

## 2023-01-09 RX ADMIN — LEVALBUTEROL HYDROCHLORIDE 0.63 MG: 0.63 SOLUTION RESPIRATORY (INHALATION) at 02:01

## 2023-01-09 RX ADMIN — IPRATROPIUM BROMIDE AND ALBUTEROL SULFATE 3 ML: 2.5; .5 SOLUTION RESPIRATORY (INHALATION) at 09:01

## 2023-01-09 RX ADMIN — GUAIFENESIN 600 MG: 600 TABLET, EXTENDED RELEASE ORAL at 09:01

## 2023-01-09 RX ADMIN — METHYLPREDNISOLONE SODIUM SUCCINATE 80 MG: 40 INJECTION, POWDER, FOR SOLUTION INTRAMUSCULAR; INTRAVENOUS at 09:01

## 2023-01-09 RX ADMIN — BUDESONIDE 0.5 MG: 0.5 INHALANT RESPIRATORY (INHALATION) at 02:01

## 2023-01-09 RX ADMIN — ENOXAPARIN SODIUM 40 MG: 100 INJECTION SUBCUTANEOUS at 06:01

## 2023-01-09 RX ADMIN — DOXYCYCLINE HYCLATE 100 MG: 100 CAPSULE ORAL at 06:01

## 2023-01-09 RX ADMIN — LEVALBUTEROL HYDROCHLORIDE 0.63 MG: 0.63 SOLUTION RESPIRATORY (INHALATION) at 03:01

## 2023-01-09 RX ADMIN — METHYLPREDNISOLONE SODIUM SUCCINATE 125 MG: 125 INJECTION, POWDER, FOR SOLUTION INTRAMUSCULAR; INTRAVENOUS at 03:01

## 2023-01-09 NOTE — Clinical Note
Diagnosis: COPD exacerbation [706868]   Future Attending Provider: VAUGHN ROCHE [[id number]]   Admitting Provider:: VAUGHN ROCHE [[id number]]   Special Needs:: No Special Needs [1]

## 2023-01-09 NOTE — CARE UPDATE
01/09/23 1426   Patient Assessment/Suction   Level of Consciousness (AVPU) alert   Respiratory Effort Unlabored   Expansion/Accessory Muscles/Retractions no retractions   All Lung Fields Breath Sounds coarse   WILMER Breath Sounds coarse   LLL Breath Sounds diminished   RUL Breath Sounds coarse   RML Breath Sounds coarse   RLL Breath Sounds diminished   Rhythm/Pattern, Respiratory pattern regular   Cough Frequency frequent   Cough Type congested;good;loose   PRE-TX-O2   Device (Oxygen Therapy) nasal cannula   $ Is the patient on Low Flow Oxygen? Yes   Flow (L/min) 3   SpO2 96 %   Pulse Oximetry Type Continuous   $ Pulse Oximetry - Single Charge Pulse Oximetry - Single   Pulse 75   Resp 18   Aerosol Therapy   $ Aerosol Therapy Charges Initial Continuous   Daily Review of Necessity (SVN) completed   Respiratory Treatment Status (SVN) given   Treatment Route (SVN) air;mask   Patient Position (SVN) HOB elevated   Post Treatment Assessment (SVN) increased aeration   Signs of Intolerance (SVN) none   Education   $ Education 15 min;Bronchodilator   Respiratory Evaluation   $ Care Plan Tech Time 15 min   $ Eval/Re-eval Charges Evaluation   Evaluation For New Orders   Home Oxygen   Has Home Oxygen? Yes   Liter Flow 3   Duration continuous   At rest without oxygen qualifying SpO2% 88   Route nasal cannula   Mode continuous   Device home concentrator  (WITH PORTABLE TANKS)     Pt doesn't have home CPAP or BiPAP but does have home O2

## 2023-01-09 NOTE — TELEPHONE ENCOUNTER
Anni Marquez Staff  Caller: Unspecified (Today,  9:11 AM)  Patient daughter (Marisol )  called and would like to see about having the patient come in today because she has a very bad cold and she is having a hard time catching her breath please give her a call at [phone]       Spoke to pt daughter. Pt is not currently in any distress. Denies any SOB or difficulty breathing. Daughter states she has been coughing a lot. Productive cough. And is not sure about a fever. Pt scheduled for same day appt.

## 2023-01-09 NOTE — ED PROVIDER NOTES
Encounter Date: 1/9/2023       History     Chief Complaint   Patient presents with    Shortness of Breath     Hx of COPD , increased SOB over 4 days     77-year-old female with a history of diabetes, hypertension, coronary artery disease, CHF, COPD on 4 L of oxygen at home habitually presents complaining of worsening shortness of breath over the past week.  She reports shortness of breath at rest as well as dyspnea with exertion.  Increased shortness of breath was preceded by cough, congestion and cold type symptoms.  She denies fever.  She denies any lower extremity pain or swelling.  She reports chronic redness to both legs which is unchanged from baseline.  She reports some fatigue.  She is not using breathing treatments at home.  She does not smoke but had a remote history of smoking.  The shortness breath has progressively worsened over the past several days.  She denies chest pain.  She denies syncope or near-syncope.  Shortness of breath has been moderate to severe in intensity.  She denies any other problems or complaints or complaints.       Review of patient's allergies indicates:   Allergen Reactions    Iodinated contrast media     Strawberries [strawberry] Hives and Itching     Past Medical History:   Diagnosis Date    CAD (coronary artery disease)     Cancer     colon    CHF (congestive heart failure)     Colitis     Colon cancer     COPD (chronic obstructive pulmonary disease)     Decreased hearing     Diabetes mellitus     Diabetes mellitus, type 2     Hypercholesterolemia     Hypertension     Hypoxemia     Insomnia     Obesity     Osteoarthritis      Past Surgical History:   Procedure Laterality Date    ANGIOGRAM, CORONARY, WITH LEFT HEART CATHETERIZATION N/A 2/10/2022    Procedure: Angiogram, Coronary, with Left Heart Cath;  Surgeon: Cristian Benjamin MD;  Location: [facility] CATH/EP LAB;  Service: Cardiology;  Laterality: N/A;    CARDIAC CATHETERIZATION      CHOLECYSTECTOMY      COLECTOMY       CORONARY ANGIOPLASTY      CORONARY STENT PLACEMENT      EXTERNAL EAR SURGERY      EYE SURGERY      FLEXIBLE SIGMOIDOSCOPY N/A 2019    Procedure: SIGMOIDOSCOPY, FLEXIBLE;  Surgeon: Biju Kramer III, MD;  Location: [facility];  Service: Endoscopy;  Laterality: N/A;    HYSTERECTOMY      partial     Family History   Problem Relation Age of Onset    Febrile seizures Mother     Heart failure Father      Social History     Tobacco Use    Smoking status: Former     Packs/day: 3.00     Years: 50.00     Pack years: 150.00     Types: Cigarettes     Start date: 3/30/1964     Quit date: 2006     Years since quittin.8    Smokeless tobacco: Never    Tobacco comments:     ex smoker 15 years   Substance Use Topics    Alcohol use: Yes    Drug use: No     Review of Systems   Constitutional:  Positive for activity change and fatigue. Negative for appetite change, chills and fever.   HENT:  Positive for congestion and rhinorrhea. Negative for dental problem, ear pain, sore throat and trouble swallowing.    Eyes: Negative.  Negative for photophobia, pain, redness and visual disturbance.   Respiratory:  Positive for cough and shortness of breath. Negative for chest tightness and wheezing.    Cardiovascular: Negative.  Negative for chest pain, palpitations and leg swelling.   Gastrointestinal: Negative.  Negative for abdominal distention, abdominal pain, anal bleeding, blood in stool, constipation, diarrhea and vomiting.   Endocrine: Negative.    Genitourinary: Negative.  Negative for decreased urine volume, difficulty urinating, dysuria, flank pain, frequency, hematuria and urgency.   Musculoskeletal:  Positive for myalgias. Negative for arthralgias, back pain, gait problem, joint swelling, neck pain and neck stiffness.   Skin: Negative.  Negative for color change, pallor and rash.   Neurological: Negative.  Negative for syncope, facial asymmetry, speech difficulty, weakness, light-headedness, numbness and headaches.    Hematological: Negative.  Does not bruise/bleed easily.   Psychiatric/Behavioral: Negative.  Negative for confusion.    All other systems reviewed and are negative.    Physical Exam     Initial Vitals [01/09/23 1256]   BP Pulse Resp Temp SpO2   (!) 140/59 80 20 98 °F (36.7 °C) (S) (!) 90 %      MAP       --         Physical Exam    Nursing note and vitals reviewed.  Constitutional: She is active and cooperative. She appears ill. No distress.   HENT:   Head: Normocephalic and atraumatic.   Right Ear: Tympanic membrane normal.   Left Ear: Tympanic membrane normal.   Nose: Mucosal edema and rhinorrhea present. Right sinus exhibits no maxillary sinus tenderness and no frontal sinus tenderness. Left sinus exhibits no maxillary sinus tenderness and no frontal sinus tenderness.   Mouth/Throat: Uvula is midline, oropharynx is clear and moist and mucous membranes are normal. No oral lesions. No uvula swelling. No oropharyngeal exudate or posterior oropharyngeal edema.   Eyes: Conjunctivae, EOM and lids are normal. Pupils are equal, round, and reactive to light. No scleral icterus.   Neck: Trachea normal and phonation normal. Neck supple. No stridor present. No JVD present.   Normal range of motion.   Full passive range of motion without pain.     Cardiovascular:  Normal rate, regular rhythm, normal heart sounds, intact distal pulses and normal pulses.     Exam reveals no gallop, no distant heart sounds and no friction rub.       No murmur heard.  Pulmonary/Chest: Accessory muscle usage present. No stridor. Tachypnea noted. No respiratory distress. She has decreased breath sounds. She has wheezes. She has rhonchi. She has no rales.   Abdominal: Abdomen is soft. Bowel sounds are normal. She exhibits no distension, no pulsatile midline mass and no mass. There is no abdominal tenderness. There is no rigidity and no guarding.   Musculoskeletal:         General: No tenderness or edema. Normal range of motion.      Right hand:  Normal. Normal range of motion. Normal strength. Normal sensation. Normal capillary refill. Normal pulse.      Left hand: Normal. Normal range of motion. Normal strength. Normal sensation. Normal capillary refill. Normal pulse.      Cervical back: Normal, full passive range of motion without pain, normal range of motion and neck supple. No edema, erythema, rigidity or bony tenderness. No spinous process tenderness. Normal range of motion.      Thoracic back: Normal. No bony tenderness. Normal range of motion.      Lumbar back: Normal. No bony tenderness. Normal range of motion.      Right foot: Normal. Normal range of motion and normal capillary refill. No tenderness or bony tenderness. Normal pulse.      Left foot: Normal. Normal range of motion and normal capillary refill. No tenderness or bony tenderness. Normal pulse.      Comments: Pulses are 2+ throughout, cap refill is less than 2 sec throughout, extremities are nontender throughout with full range of motion. There is no spinal tenderness to palpation.  Mild erythema noted to bilateral lower extremities, no increased warmth, no tenderness, (patient reports that the skin appears this way chronically)     Neurological: She is alert and oriented to person, place, and time. She has normal strength. She displays normal reflexes. No cranial nerve deficit or sensory deficit. Coordination normal.   No focal deficits.   Skin: Skin is warm, dry and intact. Capillary refill takes less than 2 seconds. No ecchymosis, no petechiae, no purpura, no rash and no abscess noted. Rash is not nodular, not pustular, not vesicular and not urticarial. No pallor.   Psychiatric: Her speech is normal. Thought content normal. Cognition and memory are normal.       ED Course   Procedures  Labs Reviewed   DRUG SCREEN PANEL, URINE EMERGENCY - Abnormal; Notable for the following components:       Result Value    Benzodiazepines Presumptive Positive (*)     All other components within normal  limits    Narrative:     Specimen Source->Urine   URINALYSIS, REFLEX TO URINE CULTURE - Abnormal; Notable for the following components:    Appearance, UA Hazy (*)     Protein, UA 1+ (*)     Occult Blood UA Trace (*)     All other components within normal limits    Narrative:     Specimen Source->Urine   TSH - Abnormal; Notable for the following components:    TSH 0.270 (*)     All other components within normal limits   CBC W/ AUTO DIFFERENTIAL - Abnormal; Notable for the following components:    Hemoglobin 11.7 (*)     MCHC 30.5 (*)     Immature Granulocytes 1.4 (*)     Gran # (ANC) 9.4 (*)     Immature Grans (Abs) 0.16 (*)     Gran % 81.8 (*)     Lymph % 8.7 (*)     All other components within normal limits   COMPREHENSIVE METABOLIC PANEL - Abnormal; Notable for the following components:    Glucose 122 (*)     BUN 27 (*)     Albumin 3.1 (*)     eGFR 39.2 (*)     All other components within normal limits   TROPONIN I HIGH SENSITIVITY - Abnormal; Notable for the following components:    Troponin I High Sensitivity 23.0 (*)     All other components within normal limits   B-TYPE NATRIURETIC PEPTIDE - Abnormal; Notable for the following components:     (*)     All other components within normal limits   URINALYSIS MICROSCOPIC - Abnormal; Notable for the following components:    Hyaline Casts, UA 5 (*)     All other components within normal limits    Narrative:     Specimen Source->Urine   LIPASE   CK   MAGNESIUM   SARS-COV-2 RNA AMPLIFICATION, QUAL   INFLUENZA A AND B ANTIGEN    Narrative:     Specimen Source->Nasopharyngeal Swab   T4, FREE   TROPONIN I HIGH SENSITIVITY        ECG Results              EKG 12-lead (In process)  Result time 01/09/23 14:20:17      In process by Interface, Lab In [facility] (01/09/23 14:20:17)                   Narrative:    Test Reason : R07.9,    Vent. Rate : 071 BPM     Atrial Rate : 071 BPM     P-R Int : 158 ms          QRS Dur : 100 ms      QT Int : 400 ms       P-R-T Axes : 076  070 057 degrees     QTc Int : 434 ms    Normal sinus rhythm  ST and T wave abnormality, consider inferior ischemia  Abnormal ECG  When compared with ECG of 29-JUN-2022 21:36,  Nonspecific T wave abnormality, worse in Lateral leads    Referred By: [email or web address]   SELF           Confirmed By:                                   Imaging Results              X-Ray Chest AP Portable (Final result)  Result time 01/09/23 13:46:03      Final result by Ivonne Hansen MD (01/09/23 13:46:03)                   Narrative:    Portable chest x-ray at 1:29 PM is compared to prior study dated 6/29/2022    Clinical history is chest pain and shortness of breath    The patient is rotated to the right. There is a right subclavian catheter in stable position.    The cardiomediastinal silhouette is normal in size. The lungs are clear. There are no acute osseous abdomen ovaries. There are degenerative changes of both shoulders.    IMPRESSION: No acute pulmonary process    Electronically signed by:  Ivonne Hansen MD  1/9/2023 1:46 PM CST Workstation: [id number]                                     Medications   methylPREDNISolone sodium succinate injection 125 mg (has no administration in time range)   levalbuterol nebulizer solution 0.63 mg (has no administration in time range)   levalbuterol nebulizer solution 0.63 mg (0.63 mg Nebulization Given 1/9/23 1426)   ipratropium 0.02 % nebulizer solution 0.5 mg (0.5 mg Nebulization Given 1/9/23 1426)   budesonide nebulizer solution 0.5 mg (0.5 mg Nebulization Given 1/9/23 1426)     Medical Decision Making:   Clinical Tests:   Lab Tests: Reviewed  Radiological Study: Reviewed  Medical Tests: Reviewed  ED Management:  Patient tachypneic with increased shortness of breath from baseline, no chest pain.  Exam with inspiratory expiratory wheezes and decreased air movement.  Patient appears uncomfortable with mild distress.  Respiratory treatments initiated, Solu-Medrol ordered, patient did have  increased air movement after 1st set of breathing treatments however now has more diffuse rhonchi and wheezing.  Tachypnea has mildly improved however the patient is still very mildly tachypneic.  She is no longer in distress.  Will discuss with hospitalist for admission.                        Clinical Impression:   Final diagnoses:  [J44.1] COPD exacerbation (Primary)               Josy Worthy MD  01/09/23 [id number]

## 2023-01-10 VITALS
DIASTOLIC BLOOD PRESSURE: 64 MMHG | RESPIRATION RATE: 18 BRPM | HEIGHT: 63 IN | WEIGHT: 175.81 LBS | OXYGEN SATURATION: 96 % | SYSTOLIC BLOOD PRESSURE: 131 MMHG | TEMPERATURE: 99 F | HEART RATE: 73 BPM | BODY MASS INDEX: 31.15 KG/M2

## 2023-01-10 LAB
ALBUMIN SERPL BCP-MCNC: 3.1 G/DL (ref 3.5–5.2)
ALP SERPL-CCNC: 81 U/L (ref 55–135)
ALT SERPL W/O P-5'-P-CCNC: 16 U/L (ref 10–44)
ANION GAP SERPL CALC-SCNC: 9 MMOL/L (ref 8–16)
AST SERPL-CCNC: 22 U/L (ref 10–40)
BASOPHILS # BLD AUTO: 0.02 K/UL (ref 0–0.2)
BASOPHILS NFR BLD: 0.2 % (ref 0–1.9)
BILIRUB SERPL-MCNC: 0.5 MG/DL (ref 0.1–1)
BUN SERPL-MCNC: 26 MG/DL (ref 8–23)
CALCIUM SERPL-MCNC: 9.5 MG/DL (ref 8.7–10.5)
CHLORIDE SERPL-SCNC: 100 MMOL/L (ref 95–110)
CO2 SERPL-SCNC: 33 MMOL/L (ref 23–29)
CREAT SERPL-MCNC: 1.4 MG/DL (ref 0.5–1.4)
DIFFERENTIAL METHOD: ABNORMAL
EOSINOPHIL # BLD AUTO: 0 K/UL (ref 0–0.5)
EOSINOPHIL NFR BLD: 0 % (ref 0–8)
ERYTHROCYTE [DISTWIDTH] IN BLOOD BY AUTOMATED COUNT: 12.4 % (ref 11.5–14.5)
EST. GFR  (NO RACE VARIABLE): 39.2 ML/MIN/1.73 M^2
GLUCOSE SERPL-MCNC: 180 MG/DL (ref 70–110)
HCT VFR BLD AUTO: 38.8 % (ref 37–48.5)
HGB BLD-MCNC: 12 G/DL (ref 12–16)
IMM GRANULOCYTES # BLD AUTO: 0.33 K/UL (ref 0–0.04)
IMM GRANULOCYTES NFR BLD AUTO: 4 % (ref 0–0.5)
LYMPHOCYTES # BLD AUTO: 0.7 K/UL (ref 1–4.8)
LYMPHOCYTES NFR BLD: 8.5 % (ref 18–48)
MAGNESIUM SERPL-MCNC: 1.9 MG/DL (ref 1.6–2.6)
MCH RBC QN AUTO: 29.6 PG (ref 27–31)
MCHC RBC AUTO-ENTMCNC: 30.9 G/DL (ref 32–36)
MCV RBC AUTO: 96 FL (ref 82–98)
MONOCYTES # BLD AUTO: 0.1 K/UL (ref 0.3–1)
MONOCYTES NFR BLD: 0.7 % (ref 4–15)
NEUTROPHILS # BLD AUTO: 7.1 K/UL (ref 1.8–7.7)
NEUTROPHILS NFR BLD: 86.6 % (ref 38–73)
NRBC BLD-RTO: 0 /100 WBC
PLATELET # BLD AUTO: 210 K/UL (ref 150–450)
PLATELET BLD QL SMEAR: ABNORMAL
PMV BLD AUTO: 11.3 FL (ref 9.2–12.9)
POTASSIUM SERPL-SCNC: 4.5 MMOL/L (ref 3.5–5.1)
PROT SERPL-MCNC: 7.1 G/DL (ref 6–8.4)
RBC # BLD AUTO: 4.05 M/UL (ref 4–5.4)
SODIUM SERPL-SCNC: 142 MMOL/L (ref 136–145)
WBC # BLD AUTO: 8.25 K/UL (ref 3.9–12.7)

## 2023-01-10 PROCEDURE — 97165 OT EVAL LOW COMPLEX 30 MIN: CPT

## 2023-01-10 PROCEDURE — G0378 HOSPITAL OBSERVATION PER HR: HCPCS

## 2023-01-10 PROCEDURE — 27000221 HC OXYGEN, UP TO 24 HOURS

## 2023-01-10 PROCEDURE — 83735 ASSAY OF MAGNESIUM: CPT | Performed by: INTERNAL MEDICINE

## 2023-01-10 PROCEDURE — 96372 THER/PROPH/DIAG INJ SC/IM: CPT | Performed by: INTERNAL MEDICINE

## 2023-01-10 PROCEDURE — 85025 COMPLETE CBC W/AUTO DIFF WBC: CPT | Performed by: INTERNAL MEDICINE

## 2023-01-10 PROCEDURE — 25000003 PHARM REV CODE 250: Performed by: INTERNAL MEDICINE

## 2023-01-10 PROCEDURE — 97161 PT EVAL LOW COMPLEX 20 MIN: CPT

## 2023-01-10 PROCEDURE — 25000003 PHARM REV CODE 250: Performed by: STUDENT IN AN ORGANIZED HEALTH CARE EDUCATION/TRAINING PROGRAM

## 2023-01-10 PROCEDURE — 99900035 HC TECH TIME PER 15 MIN (STAT)

## 2023-01-10 PROCEDURE — 96376 TX/PRO/DX INJ SAME DRUG ADON: CPT

## 2023-01-10 PROCEDURE — 80053 COMPREHEN METABOLIC PANEL: CPT | Performed by: INTERNAL MEDICINE

## 2023-01-10 PROCEDURE — 94640 AIRWAY INHALATION TREATMENT: CPT

## 2023-01-10 PROCEDURE — 63600175 PHARM REV CODE 636 W HCPCS: Performed by: INTERNAL MEDICINE

## 2023-01-10 PROCEDURE — 36415 COLL VENOUS BLD VENIPUNCTURE: CPT | Performed by: INTERNAL MEDICINE

## 2023-01-10 PROCEDURE — 94761 N-INVAS EAR/PLS OXIMETRY MLT: CPT

## 2023-01-10 PROCEDURE — 99900031 HC PATIENT EDUCATION (STAT)

## 2023-01-10 PROCEDURE — 25000242 PHARM REV CODE 250 ALT 637 W/ HCPCS: Performed by: INTERNAL MEDICINE

## 2023-01-10 PROCEDURE — 97535 SELF CARE MNGMENT TRAINING: CPT

## 2023-01-10 RX ORDER — GUAIFENESIN 600 MG/1
600 TABLET, EXTENDED RELEASE ORAL 2 TIMES DAILY
Qty: 10 TABLET | Refills: 0 | Status: ON HOLD | OUTPATIENT
Start: 2023-01-10 | End: 2023-01-19 | Stop reason: HOSPADM

## 2023-01-10 RX ORDER — AMLODIPINE BESYLATE 10 MG/1
5 TABLET ORAL DAILY
Qty: 15 TABLET | Refills: 11 | Status: ON HOLD | OUTPATIENT
Start: 2023-01-10 | End: 2023-02-02 | Stop reason: HOSPADM

## 2023-01-10 RX ORDER — CODEINE PHOSPHATE AND GUAIFENESIN 10; 100 MG/5ML; MG/5ML
10 SOLUTION ORAL EVERY 4 HOURS PRN
Qty: 118 ML | Refills: 0 | Status: ON HOLD | OUTPATIENT
Start: 2023-01-10 | End: 2023-01-19 | Stop reason: HOSPADM

## 2023-01-10 RX ORDER — PREDNISONE 20 MG/1
40 TABLET ORAL DAILY
Qty: 10 TABLET | Refills: 0 | Status: ON HOLD | OUTPATIENT
Start: 2023-01-10 | End: 2023-01-19 | Stop reason: HOSPADM

## 2023-01-10 RX ORDER — IPRATROPIUM BROMIDE AND ALBUTEROL SULFATE 2.5; .5 MG/3ML; MG/3ML
3 SOLUTION RESPIRATORY (INHALATION) EVERY 4 HOURS PRN
Qty: 120 EACH | Refills: 6 | Status: SHIPPED | OUTPATIENT
Start: 2023-01-10 | End: 2023-06-27 | Stop reason: SDUPTHER

## 2023-01-10 RX ORDER — LISINOPRIL 20 MG/1
20 TABLET ORAL DAILY
Status: DISCONTINUED | OUTPATIENT
Start: 2023-01-10 | End: 2023-01-10 | Stop reason: HOSPADM

## 2023-01-10 RX ORDER — DOXYCYCLINE 100 MG/1
100 CAPSULE ORAL EVERY 12 HOURS
Qty: 10 CAPSULE | Refills: 0 | Status: ON HOLD | OUTPATIENT
Start: 2023-01-10 | End: 2023-01-19 | Stop reason: HOSPADM

## 2023-01-10 RX ADMIN — DOXYCYCLINE HYCLATE 100 MG: 100 CAPSULE ORAL at 08:01

## 2023-01-10 RX ADMIN — GUAIFENESIN AND CODEINE PHOSPHATE 10 ML: 100; 10 SOLUTION ORAL at 01:01

## 2023-01-10 RX ADMIN — ISOSORBIDE MONONITRATE 120 MG: 60 TABLET, EXTENDED RELEASE ORAL at 08:01

## 2023-01-10 RX ADMIN — ENOXAPARIN SODIUM 40 MG: 100 INJECTION SUBCUTANEOUS at 04:01

## 2023-01-10 RX ADMIN — METHYLPREDNISOLONE SODIUM SUCCINATE 80 MG: 40 INJECTION, POWDER, FOR SOLUTION INTRAMUSCULAR; INTRAVENOUS at 08:01

## 2023-01-10 RX ADMIN — CHOLESTYRAMINE 4 G: 4 POWDER, FOR SUSPENSION ORAL at 08:01

## 2023-01-10 RX ADMIN — GUAIFENESIN AND CODEINE PHOSPHATE 10 ML: 100; 10 SOLUTION ORAL at 05:01

## 2023-01-10 RX ADMIN — PANTOPRAZOLE SODIUM 40 MG: 40 TABLET, DELAYED RELEASE ORAL at 05:01

## 2023-01-10 RX ADMIN — RANOLAZINE 500 MG: 500 TABLET, EXTENDED RELEASE ORAL at 08:01

## 2023-01-10 RX ADMIN — ALLOPURINOL 50 MG: 300 TABLET ORAL at 11:01

## 2023-01-10 RX ADMIN — BUDESONIDE 0.5 MG: 0.5 INHALANT RESPIRATORY (INHALATION) at 08:01

## 2023-01-10 RX ADMIN — METOPROLOL SUCCINATE 50 MG: 50 TABLET, FILM COATED, EXTENDED RELEASE ORAL at 08:01

## 2023-01-10 RX ADMIN — DILTIAZEM HYDROCHLORIDE 120 MG: 120 CAPSULE, COATED, EXTENDED RELEASE ORAL at 08:01

## 2023-01-10 RX ADMIN — GUAIFENESIN 600 MG: 600 TABLET, EXTENDED RELEASE ORAL at 08:01

## 2023-01-10 RX ADMIN — ARFORMOTEROL TARTRATE 15 MCG: 15 SOLUTION RESPIRATORY (INHALATION) at 08:01

## 2023-01-10 RX ADMIN — ASPIRIN 81 MG: 81 TABLET, COATED ORAL at 08:01

## 2023-01-10 RX ADMIN — AMLODIPINE BESYLATE 5 MG: 5 TABLET ORAL at 08:01

## 2023-01-10 RX ADMIN — FUROSEMIDE 40 MG: 40 TABLET ORAL at 08:01

## 2023-01-10 RX ADMIN — FLUTICASONE PROPIONATE 100 MCG: 50 SPRAY, METERED NASAL at 08:01

## 2023-01-10 RX ADMIN — IPRATROPIUM BROMIDE AND ALBUTEROL SULFATE 3 ML: 2.5; .5 SOLUTION RESPIRATORY (INHALATION) at 01:01

## 2023-01-10 RX ADMIN — LISINOPRIL 20 MG: 20 TABLET ORAL at 11:01

## 2023-01-10 RX ADMIN — IPRATROPIUM BROMIDE AND ALBUTEROL SULFATE 3 ML: 2.5; .5 SOLUTION RESPIRATORY (INHALATION) at 08:01

## 2023-01-10 NOTE — CARE UPDATE
01/09/23 2119   Patient Assessment/Suction   Level of Consciousness (AVPU) alert   Respiratory Effort Normal;Unlabored   Expansion/Accessory Muscles/Retractions no use of accessory muscles   All Lung Fields Breath Sounds Anterior:;Lateral:;coarse   Rhythm/Pattern, Respiratory unlabored   Cough Frequency frequent   Cough Type congested   PRE-TX-O2   Device (Oxygen Therapy) nasal cannula   $ Is the patient on Low Flow Oxygen? Yes   Flow (L/min) 3   SpO2 97 %   Pulse Oximetry Type Continuous   $ Pulse Oximetry - Multiple Charge Pulse Oximetry - Multiple   Pulse 81   Resp (!) 24   Aerosol Therapy   $ Aerosol Therapy Charges Aerosol Treatment   Daily Review of Necessity (SVN) completed   Respiratory Treatment Status (SVN) given   Treatment Route (SVN) mask;oxygen   Patient Position (SVN) HOB elevated   Post Treatment Assessment (SVN) increased aeration   Signs of Intolerance (SVN) none   Breath Sounds Post-Respiratory Treatment   Post-treatment Heart Rate (beats/min) 82   Post-treatment Resp Rate (breaths/min) 24   Education   $ Education Bronchodilator;15 min   Respiratory Evaluation   $ Care Plan Tech Time 15 min

## 2023-01-10 NOTE — NURSING
Patient arrived to unit via stretcher, transferred to hospital bed with assistance, patient tolerated well. O2 @3L per nasal cannula in place, respirations even and unlabored, nad noted, patient oriented to room at this time, safety precautions in place,  rails are up X2, bed low and locked, bedside table and call light within reach, plan of care discussed with patient at this time w/ understanding verbalized, see flowsheet for assessment details.

## 2023-01-10 NOTE — SUBJECTIVE & OBJECTIVE
Past Medical History:   Diagnosis Date    CAD (coronary artery disease)     Cancer     colon    CHF (congestive heart failure)     Colitis     Colon cancer     COPD (chronic obstructive pulmonary disease)     Decreased hearing     Diabetes mellitus     Diabetes mellitus, type 2     Hypercholesterolemia     Hypertension     Hypoxemia     Insomnia     Obesity     Osteoarthritis        Past Surgical History:   Procedure Laterality Date    ANGIOGRAM, CORONARY, WITH LEFT HEART CATHETERIZATION N/A 2/10/2022    Procedure: Angiogram, Coronary, with Left Heart Cath;  Surgeon: Cristian Benjamin MD;  Location: [facility] CATH/EP LAB;  Service: Cardiology;  Laterality: N/A;    CARDIAC CATHETERIZATION      CHOLECYSTECTOMY      COLECTOMY      CORONARY ANGIOPLASTY      CORONARY STENT PLACEMENT      EXTERNAL EAR SURGERY      EYE SURGERY      FLEXIBLE SIGMOIDOSCOPY N/A 9/19/2019    Procedure: SIGMOIDOSCOPY, FLEXIBLE;  Surgeon: Biju Kramer III, MD;  Location: [facility] ENDO;  Service: Endoscopy;  Laterality: N/A;    HYSTERECTOMY      partial       Review of patient's allergies indicates:   Allergen Reactions    Iodinated contrast media     Strawberries [strawberry] Hives and Itching       No current facility-administered medications on file prior to encounter.     Current Outpatient Medications on File Prior to Encounter   Medication Sig    allopurinoL (ZYLOPRIM) 100 MG tablet Take 0.5 tablets (50 mg total) by mouth once daily.    aspirin (ECOTRIN) 81 MG EC tablet Take 1 tablet (81 mg total) by mouth every morning.    diltiaZEM (CARDIZEM CD) 120 MG Cp24 Take 1 capsule (120 mg total) by mouth once daily.    ergocalciferol (VITAMIN D2) 50,000 unit Cap Take 1 capsule (50,000 Units total) by mouth every 7 days.    gabapentin (NEURONTIN) 100 MG capsule Take 1 capsule (100 mg total) by mouth every evening.    isosorbide mononitrate (IMDUR) 120 MG 24 hr tablet Take 1 tablet (120 mg total) by mouth once daily.    metoprolol succinate  (TOPROL-XL) 50 MG 24 hr tablet Take 1 tablet (50 mg total) by mouth once daily.    pantoprazole (PROTONIX) 40 MG tablet Take 1 tablet (40 mg total) by mouth once daily.    ranolazine (RANEXA) 500 MG Tb12 Take 1 tablet (500 mg total) by mouth 2 (two) times daily.    torsemide (DEMADEX) 10 MG Tab Take 1 tablet (10 mg total) by mouth once daily.    vit C/E/Zn/coppr/lutein/zeaxan (PRESERVISION AREDS-2 ORAL) Take 1 tablet by mouth once daily.    albuterol (VENTOLIN HFA) 90 mcg/actuation inhaler Inhale 2 puffs into the lungs every 6 (six) hours as needed for Wheezing or Shortness of Breath. Rescue    albuterol-ipratropium (DUO-NEB) 2.5 mg-0.5 mg/3 mL nebulizer solution Take 3 mLs by nebulization every 6 (six) hours as needed for Wheezing. Rescue    cholestyramine (QUESTRAN) 4 gram packet Take 1 packet (4 g total) by mouth 2 (two) times daily.    coenzyme Q10 100 mg capsule Take 100 mg by mouth every morning.    colchicine (COLCRYS) 0.6 mg tablet Take 1/2 tablet by mouth every other day (Patient not taking: Reported on 12/21/2022)    fish oil-omega-3 fatty acids 300-1,000 mg capsule Take 2 capsules by mouth every morning.    fluticasone-salmeterol diskus inhaler 250-50 mcg Inhale 1 puff into the lungs 2 (two) times daily.    ipratropium-albuteroL (COMBIVENT RESPIMAT)  mcg/actuation inhaler Inhale 2 puffs into the lungs every 4 (four) hours as needed for Shortness of Breath. Rescue    nitroGLYCERIN 0.4 MG/DOSE TL SPRY (NITROLINGUAL) 400 mcg/spray spray Place 1 spray under the tongue every 5 (five) minutes as needed for Chest pain (max of 3 doses).    temazepam (RESTORIL) 15 mg Cap Take 1 capsule (15 mg total) by mouth once daily at 6am.    traMADoL (ULTRAM) 50 mg tablet Take 1 tablet (50 mg total) by mouth 2 (two) times daily as needed for Pain.    triamcinolone acetonide 0.1% (KENALOG) 0.1 % cream Apply topically 2 (two) times daily.    umeclidinium (INCRUSE ELLIPTA) 62.5 mcg/actuation inhalation capsule Inhale 62.5  mcg into the lungs every morning. Controller    [DISCONTINUED] amLODIPine (NORVASC) 10 MG tablet Take 1 tablet (10 mg total) by mouth once daily. (Patient taking differently: No sig reported)    [DISCONTINUED] benazepriL (LOTENSIN) 40 MG tablet Take 1 tablet (40 mg total) by mouth once daily.    [DISCONTINUED] cimetidine (TAGAMET) 300 MG tablet Take 1 tablet (300 mg total) by mouth every 6 (six) hours. Take 1 tablet (300 mg total) by mouth starting at 6 PM on 2022 (the evening before your angiogram procedure). Then take 1 tablet at 12 AM, then take 1 tablet at 6 AM on .    [DISCONTINUED] furosemide (LASIX) 20 MG tablet Take 2 tablets (40 mg total) by mouth once daily.    [DISCONTINUED] lisinopriL 10 MG tablet Take 1 tablet (10 mg total) by mouth once daily. HOLD UNTIL OTHERWISE DIRECTED BY PRIMARY CARE PROVIDER    [DISCONTINUED] lovastatin (MEVACOR) 40 MG tablet Take 1 tablet (40 mg total) by mouth every other day.    [DISCONTINUED] ticagrelor (BRILINTA) 90 mg tablet Take 1 tablet (90 mg total) by mouth 2 (two) times a day.     Family History       Problem Relation (Age of Onset)    Febrile seizures Mother    Heart failure Father          Tobacco Use    Smoking status: Former     Packs/day: 3.00     Years: 50.00     Pack years: 150.00     Types: Cigarettes     Start date: 3/30/1964     Quit date: 2006     Years since quittin.8    Smokeless tobacco: Never    Tobacco comments:     ex smoker 15 years   Substance and Sexual Activity    Alcohol use: Yes    Drug use: No    Sexual activity: Never     Review of Systems complete 12 point review of systems reviewed and negative except as per HPI above  Objective:     Vital Signs (Most Recent):  Temp: 97.5 °F (36.4 °C) (01/10/23 0714)  Pulse: 85 (01/10/23 0714)  Resp: (!) 22 (01/10/23 0714)  BP: (!) 177/73 (01/10/23 0714)  SpO2: 96 % (01/10/23 0714)   Vital Signs (24h Range):  Temp:  [97.5 °F (36.4 °C)-98 °F (36.7 °C)] 97.5 °F (36.4  °C)  Pulse:  [75-85] 85  Resp:  [18-24] 22  SpO2:  [90 %-99 %] 96 %  BP: (121-194)/(56-81) 177/73     Weight: 79.7 kg (175 lb 12.8 oz)  Body mass index is 31.14 kg/m².    Physical Exam  General:  Appears mildly uncomfortable, nontoxic nondiaphoretic   Head and eyes:  Anicteric sclera, no conjunctival discharge, PERRLA   ENT:  Moist mucous membranes, no thrush   Pulmonary:  Diminished breath sounds diffusely with poor air movement, scattered expiratory wheezes, nasal cannula oxygen 3-4 L in place, difficulty with complete sentences   Cardiovascular:  2+ radial pulses, regular rate and rhythm, trace pedal edema   GI:  Abdomen soft and nontender, nondistended   Skin:  Dry and warm with no jaundice   Psych:  Mood is calm, affect restricted, insight good   Neuro: Nonfocal motor exam, alert and oriented, fluent speech     Significant Labs:   Hemoglobin 11, creatinine 1.4, procalcitonin 0.25, , troponin 23    Significant Imaging:   Chest x-ray reviewed impression: No acute pulmonary infiltrates

## 2023-01-10 NOTE — PLAN OF CARE
Patient is current with [facility].  If cleared for discharge today, resumption of care is set for tomorrow 1/11, per Jackie.  Order for rolling walker sent to [facility] via [facility].  Approved and to be delivered to patient.  CM following for additional needs at this time.      01/10/23 1322   Post-Acute Status   Post-Acute Authorization Home Health;HME   HME Status Referrals Sent   Home Health Status Set-up Complete/Auth obtained   Patient choice form signed by patient/caregiver List with quality metrics by geographic area provided   Discharge Plan   Discharge Plan A Home Health   Discharge Plan B Home Health

## 2023-01-10 NOTE — PT/OT/SLP EVAL
Occupational Therapy   Evaluation    Name: Marisol Kerr  MRN: [id number]  Admitting Diagnosis: COPD exacerbation  Recent Surgery: * No surgery found *      Recommendations:     Discharge Recommendations: home health OT  Discharge Equipment Recommendations:  walker, rolling  Barriers to discharge:  None    Assessment:     Marisol Kerr is a 77 y.o. female with a medical diagnosis of COPD exacerbation.  She presents with SOB and general weakness. Performance deficits affecting function: weakness, impaired endurance, impaired self care skills, impaired functional mobility, gait instability, impaired balance, decreased safety awareness, impaired cardiopulmonary response to activity.      Rehab Prognosis: Fair; patient would benefit from acute skilled OT services to address these deficits and reach maximum level of function.       Plan:     Patient to be seen 5 x/week to address the above listed problems via self-care/home management, therapeutic activities, therapeutic exercises  Plan of Care Expires: 02/10/23  Plan of Care Reviewed with: patient    Subjective     Chief Complaint: SOB and general weakness  Patient/Family Comments/goals: reduced SOB, increased functional mobility and ADL independence.    Occupational Profile:  Living Environment: lives alone in a 1 story home with threshold to enter. Patient lives in [place], daughter lives in [place]. Daughter provides transportation for patient to do to medical appointments and shopping for groceries. Patient cooking, cleans and does her laundry.  Previous level of function: Modified independent for ADLs, IADL and ambulation in the home using 2 canes.   Roles and Routines: primary homemaker  Equipment Used at Home: bedside commode, cane, straight, oxygen  Assistance upon Discharge: Daughter, intermittent.    Pain/Comfort:  Pain Rating 1: 0/10  Pain Rating Post-Intervention 1: 0/10    Patients cultural, spiritual, Scientology conflicts given the current situation:  no    Objective:     Communicated with: nurse prior to session.  Patient found sitting edge of bed with telemetry, peripheral IV, oxygen upon OT entry to room.    General Precautions: Standard, fall  Orthopedic Precautions: N/A  Braces: N/A  Respiratory Status: Nasal cannula, flow 2 L/min  O2 sats above 90% throughout session.    Occupational Performance:    Functional Mobility/Transfers:  Patient completed Sit <> Stand Transfer with minimum assistance  with  hand-held assist   Functional Mobility: ambulated 3 feet forwards/backwards with contact guard using hand-held assist.    Activities of Daily Living:  Grooming: contact guard assistance to wash face sitting EOB.    Cognitive/Visual Perceptual:  Cognitive/Psychosocial Skills:     -       Oriented to: Person, Place, and Situation   -       Follows Commands/attention:Follows two-step commands  -       Communication: clear/fluent and hard of hearing  -       Memory: Impaired STM  -       Safety awareness/insight to disability: impaired   -       Mood/Affect/Coping skills/emotional control: Cooperative and Pleasant  Visual/Perceptual:      -Intact Acuity    Physical Exam:  Balance:    -       Sitting/Standing: Contact Guard  Upper Extremity Range of Motion:     -       Right Upper Extremity: WFL  -       Left Upper Extremity: WFL  Upper Extremity Strength:    -       Right Upper Extremity: 3+/5  -       Left Upper Extremity: 3+/5   Strength:    -       Right Upper Extremity: WFL  -       Left Upper Extremity: WFL  Fine Motor Coordination:    -       Intact    AMPAC 6 Click ADL:  AMPAC Total Score: 19    Treatment & Education:  Patient instructed about the purpose of Occupational Therapy and the importance of getting OOB.    Patient left sitting edge of bed with all lines intact and call button in reach    GOALS:   Multidisciplinary Problems       Occupational Therapy Goals          Problem: Occupational Therapy    Goal Priority Disciplines Outcome Interventions    Occupational Therapy Goal     OT, PT/OT     Description: Goals to be met by: 2/10/2023     Patient will increase functional independence with ADLs by performing:    UE Dressing with Supervision.  LE Dressing with Supervision.  Grooming while standing at sink with Supervision.  Toileting from toilet with Supervision for hygiene and clothing management.   Toilet transfer to toilet with Supervision.  Perform 30 minutes sitting/standing ADL activity with O2 sats 90% or above.                         History:     Past Medical History:   Diagnosis Date    CAD (coronary artery disease)     Cancer     colon    CHF (congestive heart failure)     Colitis     Colon cancer     COPD (chronic obstructive pulmonary disease)     Decreased hearing     Diabetes mellitus     Diabetes mellitus, type 2     Hypercholesterolemia     Hypertension     Hypoxemia     Insomnia     Obesity     Osteoarthritis          Past Surgical History:   Procedure Laterality Date    ANGIOGRAM, CORONARY, WITH LEFT HEART CATHETERIZATION N/A 2/10/2022    Procedure: Angiogram, Coronary, with Left Heart Cath;  Surgeon: Cristian Benjamin MD;  Location: [facility] CATH/EP LAB;  Service: Cardiology;  Laterality: N/A;    CARDIAC CATHETERIZATION      CHOLECYSTECTOMY      COLECTOMY      CORONARY ANGIOPLASTY      CORONARY STENT PLACEMENT      EXTERNAL EAR SURGERY      EYE SURGERY      FLEXIBLE SIGMOIDOSCOPY N/A 9/19/2019    Procedure: SIGMOIDOSCOPY, FLEXIBLE;  Surgeon: Biju Kramer III, MD;  Location: [facility] ENDO;  Service: Endoscopy;  Laterality: N/A;    HYSTERECTOMY      partial       Time Tracking:     OT Date of Treatment: 01/10/23  OT Start Time: 0943  OT Stop Time: 1010  OT Total Time (min): 27 min    Billable Minutes:Evaluation 12  Self Care/Home Management 15    1/10/2023

## 2023-01-10 NOTE — PT/OT/SLP EVAL
"Physical Therapy Evaluation    Patient Name:  Marisol Kerr   MRN:  [id number]    Recommendations:     Discharge Recommendations: home health PT   Discharge Equipment Recommendations:  none  Barriers to discharge: None    Assessment:     Marisol Kerr is a 77 y.o. female admitted with a medical diagnosis of COPD exacerbation.  She presents with the following impairments/functional limitations: impaired endurance, weakness, impaired self care skills, impaired functional mobility, gait instability, impaired cardiopulmonary response to activity .    Pt presented seated at EOB after having finished OT eval.  She had 2 L 02 in place. She t/f'ed sit to stand with CGA and RW but refused ambulation as pt didn't want to  "get out  of breath."    Rehab Prognosis: Good; patient would benefit from acute skilled PT services to address these deficits and reach maximum level of function.    Recent Surgery: * No surgery found *      Plan:     During this hospitalization, patient to be seen 5 x/week to address the identified rehab impairments via gait training, therapeutic activities, therapeutic exercises and progress toward the following goals:    Plan of Care Expires:  02/10/23    Subjective     Chief Complaint: "Im not ready to go home today."  Patient/Family Comments/goals: Return home alone    Pain/Comfort:       Patients cultural, spiritual, Lutheran conflicts given the current situation:      Living Environment:  Pt lives alone in a [facility] w/o entry steps  Prior to admission, patients level of function was Mod I .  Equipment used at home: bedside commode, rollator, cane, straight, oxygen.  DME owned (not currently used): none. .  Objective:     Communicated with nurse  prior to session.  Patient found sitting edge of bed with telemetry, peripheral IV, oxygen  upon PT entry to room.    General Precautions: Standard, fall  Orthopedic Precautions:    Braces:    Respiratory Status: Nasal cannula, flow 2 L/min    Exams:  Cognitive " Exam:  Patient is oriented to Person, Place, Time, and Situation  RLE ROM: WFL  RLE Strength: WFL  LLE ROM: WFL  LLE Strength: WFL    Functional Mobility:  Transfers:     Sit to Stand:  contact guard assistance with rolling walker  Balance: CGA for standing balance      AM-PAC 6 CLICK MOBILITY  Total Score:        Treatment & Education:  Pt was educated on the role of PT  for assessment, treatment with emphasis on safety and increased mobility and D/C  recommendations.     Patient left sitting edge of bed with all lines intact and call button in reach.    GOALS:   Multidisciplinary Problems       Physical Therapy Goals          Problem: Physical Therapy    Goal Priority Disciplines Outcome Goal Variances Interventions   Physical Therapy Goal     PT, PT/OT      Description: Goals to be met by: 2/10/2023     Patient will increase functional independence with mobility by performin. Supine to sit with Stand-by Assistance  2. Sit to stand transfer with Stand-by Assistance  3. Gait  x 30  feet with Stand-by Assistance using Rolling Walker.                          History:     Past Medical History:   Diagnosis Date    CAD (coronary artery disease)     Cancer     colon    CHF (congestive heart failure)     Colitis     Colon cancer     COPD (chronic obstructive pulmonary disease)     Decreased hearing     Diabetes mellitus     Diabetes mellitus, type 2     Hypercholesterolemia     Hypertension     Hypoxemia     Insomnia     Obesity     Osteoarthritis        Past Surgical History:   Procedure Laterality Date    ANGIOGRAM, CORONARY, WITH LEFT HEART CATHETERIZATION N/A 2/10/2022    Procedure: Angiogram, Coronary, with Left Heart Cath;  Surgeon: Cristian Benjamin MD;  Location: [facility] CATH/EP LAB;  Service: Cardiology;  Laterality: N/A;    CARDIAC CATHETERIZATION      CHOLECYSTECTOMY      COLECTOMY      CORONARY ANGIOPLASTY      CORONARY STENT PLACEMENT      EXTERNAL EAR SURGERY      EYE SURGERY      FLEXIBLE  SIGMOIDOSCOPY N/A 9/19/2019    Procedure: SIGMOIDOSCOPY, FLEXIBLE;  Surgeon: Biju Kramer III, MD;  Location: [facility];  Service: Endoscopy;  Laterality: N/A;    HYSTERECTOMY      partial       Time Tracking:     PT Received On: 01/10/23  PT Start Time: 1015     PT Stop Time: 1023  PT Total Time (min): 8 min     Billable Minutes: Evaluation 8  minutes      01/10/2023

## 2023-01-10 NOTE — H&P
Critical access hospital Medicine  History & Physical    Patient Name: Marisol Kerr  MRN: [id number]  Patient Class: OP- Observation  Admission Date: 1/9/2023  Attending Physician:  Pasha Marina MD  Primary Care Provider: Khadar Dwyer MD   Date of service:  01/09/2023    Patient information was obtained from patient and ER records.     Subjective:     Principal Problem:COPD exacerbation    Chief Complaint:   Chief Complaint   Patient presents with    Shortness of Breath     Hx of COPD , increased SOB over 4 days        HPI: 77-year-old female history of COPD and chronic hypoxemic respiratory failure on home 2 L nasal cannula who presents to the hospital with shortness of breath for the last few days, constant timing.  She reports that shortness of breath became severe in intensity today despite supplemental oxygen.  She reports associated cough and wheezing.  She has some associated sinus congestion.  No chest pain or fever.  No headache or syncope.  No abdominal pain, nausea, vomiting, bleeding, dysuria, or rash.  Family at bedside.  In the emergency department the patient received bronchodilators, steroids, and supportive measures.  She has persistent symptoms and will be admitted for workup and treatment including steroids, steroid bronchodilators, and supplemental oxygen as needed.       Past Medical History:   Diagnosis Date    CAD (coronary artery disease)     Cancer     colon    CHF (congestive heart failure)     Colitis     Colon cancer     COPD (chronic obstructive pulmonary disease)     Decreased hearing     Diabetes mellitus     Diabetes mellitus, type 2     Hypercholesterolemia     Hypertension     Hypoxemia     Insomnia     Obesity     Osteoarthritis        Past Surgical History:   Procedure Laterality Date    ANGIOGRAM, CORONARY, WITH LEFT HEART CATHETERIZATION N/A 2/10/2022    Procedure: Angiogram, Coronary, with Left Heart Cath;  Surgeon: Cristian Benjamin MD;  Location: [facility]  CATH/EP LAB;  Service: Cardiology;  Laterality: N/A;    CARDIAC CATHETERIZATION      CHOLECYSTECTOMY      COLECTOMY      CORONARY ANGIOPLASTY      CORONARY STENT PLACEMENT      EXTERNAL EAR SURGERY      EYE SURGERY      FLEXIBLE SIGMOIDOSCOPY N/A 9/19/2019    Procedure: SIGMOIDOSCOPY, FLEXIBLE;  Surgeon: Biju Kramer III, MD;  Location: [facility];  Service: Endoscopy;  Laterality: N/A;    HYSTERECTOMY      partial       Review of patient's allergies indicates:   Allergen Reactions    Iodinated contrast media     Strawberries [strawberry] Hives and Itching       No current facility-administered medications on file prior to encounter.     Current Outpatient Medications on File Prior to Encounter   Medication Sig    allopurinoL (ZYLOPRIM) 100 MG tablet Take 0.5 tablets (50 mg total) by mouth once daily.    aspirin (ECOTRIN) 81 MG EC tablet Take 1 tablet (81 mg total) by mouth every morning.    diltiaZEM (CARDIZEM CD) 120 MG Cp24 Take 1 capsule (120 mg total) by mouth once daily.    ergocalciferol (VITAMIN D2) 50,000 unit Cap Take 1 capsule (50,000 Units total) by mouth every 7 days.    gabapentin (NEURONTIN) 100 MG capsule Take 1 capsule (100 mg total) by mouth every evening.    isosorbide mononitrate (IMDUR) 120 MG 24 hr tablet Take 1 tablet (120 mg total) by mouth once daily.    metoprolol succinate (TOPROL-XL) 50 MG 24 hr tablet Take 1 tablet (50 mg total) by mouth once daily.    pantoprazole (PROTONIX) 40 MG tablet Take 1 tablet (40 mg total) by mouth once daily.    ranolazine (RANEXA) 500 MG Tb12 Take 1 tablet (500 mg total) by mouth 2 (two) times daily.    torsemide (DEMADEX) 10 MG Tab Take 1 tablet (10 mg total) by mouth once daily.    vit C/E/Zn/coppr/lutein/zeaxan (PRESERVISION AREDS-2 ORAL) Take 1 tablet by mouth once daily.    albuterol (VENTOLIN HFA) 90 mcg/actuation inhaler Inhale 2 puffs into the lungs every 6 (six) hours as needed for Wheezing or Shortness of Breath. Rescue     albuterol-ipratropium (DUO-NEB) 2.5 mg-0.5 mg/3 mL nebulizer solution Take 3 mLs by nebulization every 6 (six) hours as needed for Wheezing. Rescue    cholestyramine (QUESTRAN) 4 gram packet Take 1 packet (4 g total) by mouth 2 (two) times daily.    coenzyme Q10 100 mg capsule Take 100 mg by mouth every morning.    colchicine (COLCRYS) 0.6 mg tablet Take 1/2 tablet by mouth every other day (Patient not taking: Reported on 12/21/2022)    fish oil-omega-3 fatty acids 300-1,000 mg capsule Take 2 capsules by mouth every morning.    fluticasone-salmeterol diskus inhaler 250-50 mcg Inhale 1 puff into the lungs 2 (two) times daily.    ipratropium-albuteroL (COMBIVENT RESPIMAT)  mcg/actuation inhaler Inhale 2 puffs into the lungs every 4 (four) hours as needed for Shortness of Breath. Rescue    nitroGLYCERIN 0.4 MG/DOSE TL SPRY (NITROLINGUAL) 400 mcg/spray spray Place 1 spray under the tongue every 5 (five) minutes as needed for Chest pain (max of 3 doses).    temazepam (RESTORIL) 15 mg Cap Take 1 capsule (15 mg total) by mouth once daily at 6am.    traMADoL (ULTRAM) 50 mg tablet Take 1 tablet (50 mg total) by mouth 2 (two) times daily as needed for Pain.    triamcinolone acetonide 0.1% (KENALOG) 0.1 % cream Apply topically 2 (two) times daily.    umeclidinium (INCRUSE ELLIPTA) 62.5 mcg/actuation inhalation capsule Inhale 62.5 mcg into the lungs every morning. Controller    [DISCONTINUED] amLODIPine (NORVASC) 10 MG tablet Take 1 tablet (10 mg total) by mouth once daily. (Patient taking differently: No sig reported)    [DISCONTINUED] benazepriL (LOTENSIN) 40 MG tablet Take 1 tablet (40 mg total) by mouth once daily.    [DISCONTINUED] cimetidine (TAGAMET) 300 MG tablet Take 1 tablet (300 mg total) by mouth every 6 (six) hours. Take 1 tablet (300 mg total) by mouth starting at 6 PM on Sunday April 17, 2022 (the evening before your angiogram procedure). Then take 1 tablet at 12 AM, then take 1 tablet at 6 AM on Monday  .    [DISCONTINUED] furosemide (LASIX) 20 MG tablet Take 2 tablets (40 mg total) by mouth once daily.    [DISCONTINUED] lisinopriL 10 MG tablet Take 1 tablet (10 mg total) by mouth once daily. HOLD UNTIL OTHERWISE DIRECTED BY PRIMARY CARE PROVIDER    [DISCONTINUED] lovastatin (MEVACOR) 40 MG tablet Take 1 tablet (40 mg total) by mouth every other day.    [DISCONTINUED] ticagrelor (BRILINTA) 90 mg tablet Take 1 tablet (90 mg total) by mouth 2 (two) times a day.     Family History       Problem Relation (Age of Onset)    Febrile seizures Mother    Heart failure Father          Tobacco Use    Smoking status: Former     Packs/day: 3.00     Years: 50.00     Pack years: 150.00     Types: Cigarettes     Start date: 3/30/1964     Quit date: 2006     Years since quittin.8    Smokeless tobacco: Never    Tobacco comments:     ex smoker 15 years   Substance and Sexual Activity    Alcohol use: Yes    Drug use: No    Sexual activity: Never     Review of Systems complete 12 point review of systems reviewed and negative except as per HPI above  Objective:     Vital Signs (Most Recent):  Temp: 97.5 °F (36.4 °C) (01/10/23 0714)  Pulse: 85 (01/10/23 0714)  Resp: (!) 22 (01/10/23 0714)  BP: (!) 177/73 (01/10/23 0714)  SpO2: 96 % (01/10/23 0714)   Vital Signs (24h Range):  Temp:  [97.5 °F (36.4 °C)-98 °F (36.7 °C)] 97.5 °F (36.4 °C)  Pulse:  [75-85] 85  Resp:  [18-24] 22  SpO2:  [90 %-99 %] 96 %  BP: (121-194)/(56-81) 177/73     Weight: 79.7 kg (175 lb 12.8 oz)  Body mass index is 31.14 kg/m².    Physical Exam  General:  Appears mildly uncomfortable, nontoxic nondiaphoretic   Head and eyes:  Anicteric sclera, no conjunctival discharge, PERRLA   ENT:  Moist mucous membranes, no thrush   Pulmonary:  Diminished breath sounds diffusely with poor air movement, scattered expiratory wheezes, nasal cannula oxygen 3-4 L in place, difficulty with complete sentences   Cardiovascular:  2+ radial pulses, regular rate and rhythm,  trace pedal edema   GI:  Abdomen soft and nontender, nondistended   Skin:  Dry and warm with no jaundice   Psych:  Mood is calm, affect restricted, insight good   Neuro: Nonfocal motor exam, alert and oriented, fluent speech     Significant Labs:   Hemoglobin 11, creatinine 1.4, procalcitonin 0.25, , troponin 23    Significant Imaging:   Chest x-ray reviewed impression: No acute pulmonary infiltrates    Assessment/Plan:     * COPD exacerbation  Acute exacerbation of COPD with acute on chronic hypoxemic respiratory failure   Place in observation  Supplemental oxygen as needed and wean as able  Serial bronchodilators   Additional IV steroids x2 doses then likely transition to oral prednisone   Start empiric respiratory antibiotics with doxycycline, procalcitonin mildly elevated, chest x-ray clear   Antitussive and mucolytic as needed   Treat nasal congestion with Flonase and Mucinex   Serial labs   Continue home medications for chronic issues as able   Mobilize as able with PT/OT   VTE prophylaxis SCDs, consider low-molecular heparin if hospitalized greater than 24 hours  Likely needs 24-48 hours hospitalization      VTE Risk Mitigation (From admission, onward)           Ordered     enoxaparin injection 40 mg  Daily         01/09/23 1715     IP VTE HIGH RISK PATIENT  Once         01/09/23 1715     Place sequential compression device  Until discontinued         01/09/23 1715                       Pasha Marina MD  Department of Hospital Medicine   [facility]

## 2023-01-10 NOTE — HPI
77-year-old female history of COPD and chronic hypoxemic respiratory failure on home 2 L nasal cannula who presents to the hospital with shortness of breath for the last few days, constant timing.  She reports that shortness of breath became severe in intensity today despite supplemental oxygen.  She reports associated cough and wheezing.  She has some associated sinus congestion.  No chest pain or fever.  No headache or syncope.  No abdominal pain, nausea, vomiting, bleeding, dysuria, or rash.  Family at bedside.  In the emergency department the patient received bronchodilators, steroids, and supportive measures.  She has persistent symptoms and will be admitted for workup and treatment including steroids, steroid bronchodilators, and supplemental oxygen as needed.

## 2023-01-10 NOTE — PLAN OF CARE
[facility]  Initial Discharge Assessment       Primary Care Provider: Khadar Dwyer MD    Admission Diagnosis: COPD exacerbation [J44.1]    Admission Date: 1/9/2023      DC assessment completed with patient at bedside.  Information verified as correct on facesheet.  Patient states HPOA is daughter.  Denies HD/Coumadin.  Patient has oxygen at home with [facility].  Patient has Home Health bwith [facility].  Will like to resume home health when discharged.  Patient independent in ADL's.  DC plan is home. Daughter to provide transport on discharge.             Payor: MEDICARE / Plan: MEDICARE PART A & B / Product Type: Government /     Extended Emergency Contact Information  Primary Emergency Contact: Marisol Kerr  Address: [address] [place] [address] [place]  Mobile Phone: [phone]  Relation: Daughter  Preferred language: English   needed? No    Discharge Plan A: (P) Home Health  Discharge Plan B: (P) Home Health      [facility] [address] [place], [place] - [address]  [address] [address]  Phone: [phone] Fax: [phone]    [facility] - [place], [place] - [address]  [address] [address]  Phone: [phone] Fax: [phone]      Initial Assessment (most recent)       Adult Discharge Assessment - 01/10/23 1256          Discharge Assessment    Assessment Type Discharge Planning Assessment (P)      Confirmed/corrected address, phone number and insurance Yes (P)      Confirmed Demographics Correct on Facesheet (P)      Source of Information patient (P)      Does patient/caregiver understand observation status Yes (P)      Communicated LUZ with patient/caregiver Yes (P)      Reason For Admission COPD exacerbation (P)      People in Home alone (P)      Facility Arrived From: home (P)      Do you expect to return to your current living situation? Yes (P)      Do you  have help at home or someone to help you manage your care at home? No (P)      Current cognitive status: Alert/Oriented (P)      Equipment Currently Used at Home oxygen (P)      Readmission within 30 days? No (P)      Patient currently being followed by outpatient case management? No (P)      Do you currently have service(s) that help you manage your care at home? Yes (P)      Is the pt/caregiver preference to resume services with current agency Yes (P)      Do you take prescription medications? Yes (P)      Do you have prescription coverage? Yes (P)      Do you have any problems affording any of your prescribed medications? No (P)      Is the patient taking medications as prescribed? yes (P)      Who is going to help you get home at discharge? daughter (P)      How do you get to doctors appointments? family or friend will provide (P)      Are you on dialysis? No (P)      Do you take coumadin? No (P)      Discharge Plan A Home Health (P)      Discharge Plan B Home Health (P)      DME Needed Upon Discharge  walker, rolling (P)      Discharge Plan discussed with: Patient (P)

## 2023-01-10 NOTE — PLAN OF CARE
BURROWS explained to patient.  Patient verbalized understanding and signed BURROWS form.     01/10/23 1327   BURROWS Message   Medicare Outpatient and Observation Notification regarding financial responsibility Given to patient/caregiver;Explained to patient/caregiver;Signed/date by patient/caregiver   Date BURROWS was signed 01/10/23   Time BURROWS was signed [id number]

## 2023-01-10 NOTE — PROGRESS NOTES
Automatic Inhaler to Nebulizer Interchange    fluticasone/vilanterol (Breo Ellipta) 100 mcg/25 mcg changed to budesonide 0.5 mg twice daily AND arformoterol 15 mcg twice daily per [facility] Automatic Therapeutic Substitutions Protocol.    Please contact pharmacy at extension [phone] with any questions.     Thank you,   Victor Manuel Townsend

## 2023-01-10 NOTE — PLAN OF CARE
DC orders and chart reviewed. No discharge needs noted.  Patient cleared for discharge from .  Patient discharging home with resumption of care with [facility]. Per Jackie, CIERRA date is tomorrow 1/11.  Patient approved for rolling walker from [facility].  Delivered to patient at bedside. In Basket message sent to Dr. Dwyer's office staff to contact patient for follow up appointment information.         01/10/23 1656   Final Note   Assessment Type Final Discharge Note   Anticipated Discharge Disposition Home-Health   What phone number can be called within the next 1-3 days to see how you are doing after discharge? [phone]   Hospital Resources/Appts/Education Provided Appointments scheduled and added to AVS   Post-Acute Status   Post-Acute Authorization Home Health   HME Status Set-up Complete/Auth obtained   Home Health Status Set-up Complete/Auth obtained   Patient choice form signed by patient/caregiver List with quality metrics by geographic area provided

## 2023-01-10 NOTE — ASSESSMENT & PLAN NOTE
Acute exacerbation of COPD with acute on chronic hypoxemic respiratory failure   Place in observation  Supplemental oxygen as needed and wean as able  Serial bronchodilators   Additional IV steroids x2 doses then likely transition to oral prednisone   Start empiric respiratory antibiotics with doxycycline, procalcitonin mildly elevated, chest x-ray clear   Antitussive and mucolytic as needed   Treat nasal congestion with Flonase and Mucinex   Serial labs   Continue home medications for chronic issues as able   Mobilize as able with PT/OT   VTE prophylaxis SCDs, consider low-molecular heparin if hospitalized greater than 24 hours  Likely needs 24-48 hours hospitalization

## 2023-01-10 NOTE — DISCHARGE SUMMARY
[facility] Medicine  Discharge Summary      Patient Name: Marisol Kerr  MRN: [id number]  YURY: [id number]  Patient Class: OP- Observation  Admission Date: 1/9/2023  Hospital Length of Stay: 0 days  Discharge Date and Time:  01/10/2023 5:05 PM  Attending Physician: Massimo Bedolla MD   Discharging Provider: Massimo Bedolla MD  Primary Care Provider: Khadar Dwyer MD    Primary Care Team: Networked reference to record PCT     HPI:   77-year-old female history of COPD and chronic hypoxemic respiratory failure on home 2 L nasal cannula who presents to the hospital with shortness of breath for the last few days, constant timing.  She reports that shortness of breath became severe in intensity today despite supplemental oxygen.  She reports associated cough and wheezing.  She has some associated sinus congestion.  No chest pain or fever.  No headache or syncope.  No abdominal pain, nausea, vomiting, bleeding, dysuria, or rash.  Family at bedside.  In the emergency department the patient received bronchodilators, steroids, and supportive measures.  She has persistent symptoms and will be admitted for workup and treatment including steroids, steroid bronchodilators, and supplemental oxygen as needed.       * No surgery found *      Hospital Course:   Pt is a 78 y/o F with hx of COPD, chronic hypoxic respiratory failure, CAD, DMII who is here with shortness of breath and wheezing. She was found to have COPD exacerbation and treated with nebulizers , antibiotics, and cough suppressant as well as steroids. She had improvement in her symptoms and is back on her home oxygen level. She is at her baseline, and she was felt to be clinically stable for discharge home with ongoing outpatient management of her COPD exacerbation. She will continue doxycycline and prednisone to complete a 5 day course. She was prescribed nebulizers, as well as cough suppressants and expectorant. She was given return  precautions. I reviewed the discharge plan of care and instructions with her on the day of  discharge. She was discharged in good condition with plans for close outpatient follow up.        Goals of Care Treatment Preferences:  Code Status: Full Code      Consults:   Consults (From admission, onward)        Status Ordering Provider     Inpatient consult to Hospitalist  Once        Provider:  Pasha Marina MD    Acknowledged PASHA MARINA.          * COPD exacerbation  Acute exacerbation of COPD with acute on chronic hypoxemic respiratory failure   Place in observation  Supplemental oxygen as needed and wean as able  Serial bronchodilators   Additional IV steroids x2 doses then likely transition to oral prednisone   Start empiric respiratory antibiotics with doxycycline, procalcitonin mildly elevated, chest x-ray clear   Antitussive and mucolytic as needed   Treat nasal congestion with Flonase and Mucinex   Serial labs   Continue home medications for chronic issues as able   Mobilize as able with PT/OT   VTE prophylaxis SCDs, consider low-molecular heparin if hospitalized greater than 24 hours  Likely needs 24-48 hours hospitalization      Final Active Diagnoses:    Diagnosis Date Noted POA    PRINCIPAL PROBLEM:  COPD exacerbation [J44.1] 01/09/2023 Unknown      Problems Resolved During this Admission:       Discharged Condition: good    [place]position[place] [facility] [place]    Follow Up:   Follow-up Information     hKadar Dwyer MD. Call.    Specialties: Family Medicine, Home Health Services, Hospice Services  Why: Message sent to Dr. Dwyer's office staff for hospital follow up appointment.  Office to contact you with appointment information.  If you do not hear from them by Thursday, please call the office for appointment information  Contact information:  [address]  [address]  [facility] [address]  [phone]             Jayne Correa MD. Schedule an appointment as  "soon as possible for a visit in 1 month(s).    Specialties: Pulmonary Disease, Sleep Medicine  Contact information:  [address] [facility]  [address]  [facility] [address] [phone]                       Patient Instructions:      WALKER FOR HOME USE     Order Specific Question Answer Comments   Type of Walker: Adult (5'4"-6'6")    With wheels? Yes    Height: 5' 3" (1.6 m)    Weight: 79.7 kg (175 lb 12.8 oz)    Length of need (1-99 months): 99    Does patient have medical equipment at home? oxygen    Please check all that apply: Patient is unable to safely ambulate without equipment.      NEBULIZER FOR HOME USE     Order Specific Question Answer Comments   Height: 5' 3" (1.6 m)    Weight: 79.7 kg (175 lb 12.8 oz)    Does patient have medical equipment at home? oxygen    Does patient have medical equipment at home? rollator    Length of need (1-99 months): 12      Ambulatory referral/consult to Outpatient Case Management   Referral Priority: Routine Referral Type: Consultation   Referral Reason: Specialty Services Required   Number of Visits Requested: 1     Diet Adult Regular     No dressing needed     Activity as tolerated       Significant Diagnostic Studies: Labs:   BMP:   Recent Labs   Lab 01/09/23  1301 01/10/23  0414   * 180*    142   K 4.1 4.5    100   CO2 28 33*   BUN 27* 26*   CREATININE 1.4 1.4   CALCIUM 8.9 9.5   MG 1.9 1.9   , CBC   Recent Labs   Lab 01/09/23  1301 01/10/23  0414   WBC 11.52 8.25   HGB 11.7* 12.0   HCT 38.3 38.8    210    and All labs within the past 24 hours have been reviewed    Pending Diagnostic Studies:     None         Medications:  Reconciled Home Medications:      Medication List      START taking these medications    doxycycline 100 MG Cap  Commonly known as: VIBRAMYCIN  Take 1 capsule (100 mg total) by mouth every 12 (twelve) hours. for 5 days     guaiFENesin 600 mg 12 hr tablet  Commonly known as: MUCINEX  Take 1 tablet (600 mg total) by mouth 2 (two) " times daily. for 5 days     guaiFENesin-codeine 100-10 mg/5 ml  mg/5 mL syrup  Commonly known as: TUSSI-ORGANIDIN NR  Take 10 mLs by mouth every 4 (four) hours as needed for Cough.     predniSONE 20 MG tablet  Commonly known as: DELTASONE  Take 2 tablets (40 mg total) by mouth once daily. for 5 days        CHANGE how you take these medications    * CombiVENT RESPIMAT  mcg/actuation inhaler  Generic drug: ipratropium-albuteroL  Inhale 2 puffs into the lungs every 4 (four) hours as needed for Shortness of Breath. Rescue  What changed: Another medication with the same name was changed. Make sure you understand how and when to take each.     * albuterol-ipratropium 2.5 mg-0.5 mg/3 mL nebulizer solution  Commonly known as: DUO-NEB  Take 3 mLs by nebulization every 4 (four) hours as needed for Wheezing or Shortness of Breath. Rescue  What changed:   · when to take this  · reasons to take this         * This list has 2 medication(s) that are the same as other medications prescribed for you. Read the directions carefully, and ask your doctor or other care provider to review them with you.            CONTINUE taking these medications    albuterol 90 mcg/actuation inhaler  Commonly known as: VENTOLIN HFA  Inhale 2 puffs into the lungs every 6 (six) hours as needed for Wheezing or Shortness of Breath. Rescue     allopurinoL 100 MG tablet  Commonly known as: ZYLOPRIM  Take 0.5 tablets (50 mg total) by mouth once daily.     amLODIPine 10 MG tablet  Commonly known as: NORVASC  Take 0.5 tablets (5 mg total) by mouth once daily.     aspirin 81 MG EC tablet  Commonly known as: ECOTRIN  Take 1 tablet (81 mg total) by mouth every morning.     cholestyramine 4 gram packet  Commonly known as: QUESTRAN  Take 1 packet (4 g total) by mouth 2 (two) times daily.     coenzyme Q10 100 mg capsule  Take 100 mg by mouth every morning.     diltiaZEM 120 MG Cp24  Commonly known as: CARDIZEM CD  Take 1 capsule (120 mg total) by mouth  once daily.     ergocalciferol 50,000 unit Cap  Commonly known as: VITAMIN D2  Take 1 capsule (50,000 Units total) by mouth every 7 days.     fish oil-omega-3 fatty acids 300-1,000 mg capsule  Take 2 capsules by mouth every morning.     fluticasone-salmeterol 250-50 mcg/dose 250-50 mcg/dose diskus inhaler  Commonly known as: ADVAIR DISKUS  Inhale 1 puff into the lungs 2 (two) times daily.     gabapentin 100 MG capsule  Commonly known as: NEURONTIN  Take 1 capsule (100 mg total) by mouth every evening.     INCRUSE ELLIPTA 62.5 mcg/actuation inhalation capsule  Generic drug: umeclidinium  Inhale 62.5 mcg into the lungs every morning. Controller     isosorbide mononitrate 120 MG 24 hr tablet  Commonly known as: IMDUR  Take 1 tablet (120 mg total) by mouth once daily.     metoprolol succinate 50 MG 24 hr tablet  Commonly known as: TOPROL-XL  Take 1 tablet (50 mg total) by mouth once daily.     nitroGLYCERIN 0.4 MG/DOSE TL SPRY 400 mcg/spray spray  Commonly known as: NITROLINGUAL  Place 1 spray under the tongue every 5 (five) minutes as needed for Chest pain (max of 3 doses).     pantoprazole 40 MG tablet  Commonly known as: PROTONIX  Take 1 tablet (40 mg total) by mouth once daily.     PRESERVISION AREDS-2 ORAL  Take 1 tablet by mouth once daily.     ranolazine 500 MG Tb12  Commonly known as: RANEXA  Take 1 tablet (500 mg total) by mouth 2 (two) times daily.     temazepam 15 mg Cap  Commonly known as: RESTORIL  Take 1 capsule (15 mg total) by mouth once daily at 6am.     torsemide 10 MG Tab  Commonly known as: DEMADEX  Take 1 tablet (10 mg total) by mouth once daily.     traMADoL 50 mg tablet  Commonly known as: ULTRAM  Take 1 tablet (50 mg total) by mouth 2 (two) times daily as needed for Pain.     triamcinolone acetonide 0.1% 0.1 % cream  Commonly known as: KENALOG  Apply topically 2 (two) times daily.        ASK your doctor about these medications    colchicine 0.6 mg tablet  Commonly known as: COLCRYS  Take 1/2  tablet by mouth every other day            Indwelling Lines/Drains at time of discharge:   Lines/Drains/Airways     None                 Time spent on the discharge of patient: 50 minutes         Massimo Bedolla MD  Department of Hospital Medicine  [facility]

## 2023-01-10 NOTE — HOSPITAL COURSE
Pt is a 76 y/o F with hx of COPD, chronic hypoxic respiratory failure, CAD, DMII who is here with shortness of breath and wheezing. She was found to have COPD exacerbation and treated with nebulizers , antibiotics, and cough suppressant as well as steroids. She had improvement in her symptoms and is back on her home oxygen level. She is at her baseline, and she was felt to be clinically stable for discharge home with ongoing outpatient management of her COPD exacerbation. She will continue doxycycline and prednisone to complete a 5 day course. She was prescribed nebulizers, as well as cough suppressants and expectorant. She was given return precautions. I reviewed the discharge plan of care and instructions with her on the day of  discharge. She was discharged in good condition with plans for close outpatient follow up.

## 2023-01-10 NOTE — CARE UPDATE
01/10/23 0855   Patient Assessment/Suction   Level of Consciousness (AVPU) alert   All Lung Fields Breath Sounds diminished   PRE-TX-O2   Device (Oxygen Therapy) nasal cannula   $ Is the patient on Low Flow Oxygen? Yes   Flow (L/min) 2   SpO2 95 %   Pulse Oximetry Type Intermittent   $ Pulse Oximetry - Multiple Charge Pulse Oximetry - Multiple   Pulse 94   Resp 16   Aerosol Therapy   $ Aerosol Therapy Charges Aerosol Treatment  (duoneb)   Daily Review of Necessity (SVN) completed   Respiratory Treatment Status (SVN) given   Treatment Route (SVN) mask   Patient Position (SVN) sitting on edge of bed   Post Treatment Assessment (SVN) increased aeration   Signs of Intolerance (SVN) none   Breath Sounds Post-Respiratory Treatment   Post-treatment Heart Rate (beats/min) 94   Post-treatment Resp Rate (breaths/min) 15   Education   $ Education Bronchodilator;15 min   Respiratory Evaluation   $ Care Plan Tech Time 15 min

## 2023-01-10 NOTE — PLAN OF CARE
Problem: Adult Inpatient Plan of Care  Goal: Plan of Care Review  Outcome: Met  Goal: Patient-Specific Goal (Individualized)  Outcome: Met  Goal: Absence of Hospital-Acquired Illness or Injury  Outcome: Met  Goal: Optimal Comfort and Wellbeing  Outcome: Met  Goal: Readiness for Transition of Care  Outcome: Met     Problem: Diabetes Comorbidity  Goal: Blood Glucose Level Within Targeted Range  Outcome: Met     Problem: Skin Injury Risk Increased  Goal: Skin Health and Integrity  Outcome: Met

## 2023-01-11 ENCOUNTER — PATIENT OUTREACH (OUTPATIENT)
Dept: FAMILY MEDICINE | Facility: CLINIC | Age: 78
End: 2023-01-11

## 2023-01-11 ENCOUNTER — TELEPHONE (OUTPATIENT)
Dept: FAMILY MEDICINE | Facility: CLINIC | Age: 78
End: 2023-01-11

## 2023-01-11 NOTE — TELEPHONE ENCOUNTER
Pts daughter is going to call to schedule HFU   Sent bee other encounter to discuss ordering portal oxygen   Spoke to pts daughter scheduled for jan 19 at 2 pm .

## 2023-01-11 NOTE — PROGRESS NOTES
Discharge Information     Discharge Date:   1/10/2023    Primary Discharge Diagnosis:  COPD Exacerbation      Discharge Summary:  Reviewed      Medication & Order Review     Were medication changes made or new medications added?   Yes     If so, has the patient filled the prescriptions?  Yes     Was Home Health ordered? No    If so, has Home Health contacted patient and/or initiated services?  No    Name of Home Health Agency? N/A    Durable Medical Equipment ordered?  No     If so, has the DME provider contacted patient and delivered equipment?  N/A    Follow Up               Any problems since discharge? Yes    How is the patient feeling since returning home?  Pt stated she still has the cough and has no pain.     Have you set up recommended follow up appointments?  Dr. Correa 1 month follow up pt stated daughter is going to schedule the appointment.     Schedule Hospital Follow-up appointment within 7-14 days (preferably 7).      Notes:  Pt stated she still has the cough and has no pain. Pt stated she needs to talk to Antonieta about ordering portal oxygen.        Bhavna Walters

## 2023-01-11 NOTE — PT/OT/SLP DISCHARGE
Occupational Therapy Discharge Summary    Marisol Kerr  MRN: [id number]   Principal Problem: COPD exacerbation      Patient Discharged from acute Occupational Therapy on 1/10/2023.  Please refer to prior OT note dated 1/10/2023 for functional status.    Assessment:      Patient has not met goals.    Objective:     GOALS:   Multidisciplinary Problems       Occupational Therapy Goals       Not on file              Multidisciplinary Problems (Resolved)          Problem: Occupational Therapy    Goal Priority Disciplines Outcome Interventions   Occupational Therapy Goal   (Resolved)     OT, PT/OT Met    Description: Goals to be met by: 2/10/2023     Patient will increase functional independence with ADLs by performing:    UE Dressing with Supervision.  LE Dressing with Supervision.  Grooming while standing at sink with Supervision.  Toileting from toilet with Supervision for hygiene and clothing management.   Toilet transfer to toilet with Supervision.  Perform 30 minutes sitting/standing ADL activity with O2 sats 90% or above.                         Reasons for Discontinuation of Therapy Services  Patient d/c home.       Plan:     Patient Discharged to: Home with Home Health Service    1/11/2023

## 2023-01-11 NOTE — TELEPHONE ENCOUNTER
Spoke to pt about Hospital follow up. Pt stated she was told by hospital doctors to talk to PCP about ordering portable oxygen. stevie

## 2023-01-11 NOTE — TELEPHONE ENCOUNTER
Let pts daughter know per bee oxygen will be discussed at HFU. Pt scheduled on lindas next available HFU on jan 19 at 2 pm

## 2023-01-11 NOTE — TELEPHONE ENCOUNTER
We can discuss portable oxygen at her hospital follow-up but I would recommend she be seen before the 30th

## 2023-01-11 NOTE — TELEPHONE ENCOUNTER
----- Message from La Posey MA sent at 1/11/2023  8:30 AM CST -----    ----- Message -----  From: Lena Larsen RN  Sent: 1/10/2023   4:56 PM CST  To: Khadar Dwyer Staff    Patient discharging today from [facility] and will need a hospital follow up in a week.  Please contact patient with hospital follow up information.     Thank you!

## 2023-01-11 NOTE — NURSING
1814 Discharge instructions reviewed with pt.  Pt voice understanding.  Pt await daughter to bring clothes and pickup.     1845  Pt discharged via wheelchair.  Peripheral IV removed, pressure applied.  Telemetry removed.  Pt discharged via wheelchair.

## 2023-01-15 ENCOUNTER — HOSPITAL ENCOUNTER (INPATIENT)
Facility: HOSPITAL | Age: 78
LOS: 3 days | Discharge: HOME HEALTH | DRG: 444 | End: 2023-01-19
Attending: EMERGENCY MEDICINE | Admitting: STUDENT IN AN ORGANIZED HEALTH CARE EDUCATION/TRAINING PROGRAM
Payer: MEDICARE

## 2023-01-15 DIAGNOSIS — K80.50 CHOLEDOCHOLITHIASIS: ICD-10-CM

## 2023-01-15 DIAGNOSIS — I25.10 ARTERIOSCLEROTIC CARDIOVASCULAR DISEASE (ASCVD): ICD-10-CM

## 2023-01-15 DIAGNOSIS — N17.9 ACUTE KIDNEY INJURY: ICD-10-CM

## 2023-01-15 DIAGNOSIS — I25.10 ASCVD (ARTERIOSCLEROTIC CARDIOVASCULAR DISEASE): ICD-10-CM

## 2023-01-15 DIAGNOSIS — R07.9 CHEST PAIN: ICD-10-CM

## 2023-01-15 DIAGNOSIS — R94.31 ABNORMAL EKG: ICD-10-CM

## 2023-01-15 DIAGNOSIS — R11.10 VOMITING, UNSPECIFIED VOMITING TYPE, UNSPECIFIED WHETHER NAUSEA PRESENT: Primary | ICD-10-CM

## 2023-01-15 PROCEDURE — 93005 ELECTROCARDIOGRAM TRACING: CPT | Performed by: SPECIALIST

## 2023-01-15 PROCEDURE — 93010 EKG 12-LEAD: ICD-10-PCS | Mod: ,,, | Performed by: SPECIALIST

## 2023-01-15 PROCEDURE — 93010 ELECTROCARDIOGRAM REPORT: CPT | Mod: ,,, | Performed by: SPECIALIST

## 2023-01-16 ENCOUNTER — ANESTHESIA EVENT (OUTPATIENT)
Dept: SURGERY | Facility: HOSPITAL | Age: 78
DRG: 444 | End: 2023-01-16
Payer: MEDICARE

## 2023-01-16 ENCOUNTER — ANESTHESIA (OUTPATIENT)
Dept: SURGERY | Facility: HOSPITAL | Age: 78
DRG: 444 | End: 2023-01-16
Payer: MEDICARE

## 2023-01-16 PROBLEM — R00.1 BRADYCARDIA: Status: ACTIVE | Noted: 2023-01-16

## 2023-01-16 PROBLEM — K80.50 CHOLEDOCHOLITHIASIS: Status: ACTIVE | Noted: 2023-01-16

## 2023-01-16 LAB
ALBUMIN SERPL BCP-MCNC: 2.5 G/DL (ref 3.5–5.2)
ALBUMIN SERPL BCP-MCNC: 2.6 G/DL (ref 3.5–5.2)
ALBUMIN SERPL BCP-MCNC: 3.1 G/DL (ref 3.5–5.2)
ALP SERPL-CCNC: 55 U/L (ref 55–135)
ALP SERPL-CCNC: 59 U/L (ref 55–135)
ALP SERPL-CCNC: 76 U/L (ref 55–135)
ALT SERPL W/O P-5'-P-CCNC: 12 U/L (ref 10–44)
ALT SERPL W/O P-5'-P-CCNC: 12 U/L (ref 10–44)
ALT SERPL W/O P-5'-P-CCNC: 17 U/L (ref 10–44)
ANION GAP SERPL CALC-SCNC: 15 MMOL/L (ref 8–16)
ANION GAP SERPL CALC-SCNC: 15 MMOL/L (ref 8–16)
ANION GAP SERPL CALC-SCNC: 17 MMOL/L (ref 8–16)
AST SERPL-CCNC: 21 U/L (ref 10–40)
AST SERPL-CCNC: 25 U/L (ref 10–40)
AST SERPL-CCNC: 28 U/L (ref 10–40)
BACTERIA #/AREA URNS HPF: ABNORMAL /HPF
BASOPHILS NFR BLD: 0 % (ref 0–1.9)
BASOPHILS NFR BLD: 0 % (ref 0–1.9)
BILIRUB SERPL-MCNC: 1 MG/DL (ref 0.1–1)
BILIRUB UR QL STRIP: NEGATIVE
BNP SERPL-MCNC: 258 PG/ML (ref 0–99)
BUN SERPL-MCNC: 112 MG/DL (ref 8–23)
BUN SERPL-MCNC: 112 MG/DL (ref 8–23)
BUN SERPL-MCNC: 115 MG/DL (ref 8–23)
BUN SERPL-MCNC: 118 MG/DL (ref 8–23)
BUN SERPL-MCNC: 121 MG/DL (ref 8–23)
CALCIUM SERPL-MCNC: 7 MG/DL (ref 8.7–10.5)
CALCIUM SERPL-MCNC: 7 MG/DL (ref 8.7–10.5)
CALCIUM SERPL-MCNC: 7.3 MG/DL (ref 8.7–10.5)
CALCIUM SERPL-MCNC: 7.4 MG/DL (ref 8.7–10.5)
CALCIUM SERPL-MCNC: 7.4 MG/DL (ref 8.7–10.5)
CHLORIDE SERPL-SCNC: 100 MMOL/L (ref 95–110)
CHLORIDE SERPL-SCNC: 99 MMOL/L (ref 95–110)
CHLORIDE SERPL-SCNC: 99 MMOL/L (ref 95–110)
CLARITY UR: CLEAR
CO2 SERPL-SCNC: 18 MMOL/L (ref 23–29)
CO2 SERPL-SCNC: 18 MMOL/L (ref 23–29)
CO2 SERPL-SCNC: 19 MMOL/L (ref 23–29)
CO2 SERPL-SCNC: 20 MMOL/L (ref 23–29)
CO2 SERPL-SCNC: 23 MMOL/L (ref 23–29)
COLOR UR: YELLOW
CREAT SERPL-MCNC: 5.3 MG/DL (ref 0.5–1.4)
CREAT SERPL-MCNC: 5.6 MG/DL (ref 0.5–1.4)
CREAT SERPL-MCNC: 5.8 MG/DL (ref 0.5–1.4)
CREAT SERPL-MCNC: 6.2 MG/DL (ref 0.5–1.4)
DIFFERENTIAL METHOD: ABNORMAL
DIFFERENTIAL METHOD: ABNORMAL
EOSINOPHIL NFR BLD: 0 % (ref 0–8)
EOSINOPHIL NFR BLD: 0 % (ref 0–8)
ERYTHROCYTE [DISTWIDTH] IN BLOOD BY AUTOMATED COUNT: 12.9 % (ref 11.5–14.5)
ERYTHROCYTE [DISTWIDTH] IN BLOOD BY AUTOMATED COUNT: 13 % (ref 11.5–14.5)
EST. GFR  (NO RACE VARIABLE): 7.1 ML/MIN/1.73 M^2
EST. GFR  (NO RACE VARIABLE): 7.4 ML/MIN/1.73 M^2
EST. GFR  (NO RACE VARIABLE): 7.9 ML/MIN/1.73 M^2
GIANT PLATELETS BLD QL SMEAR: PRESENT
GLUCOSE SERPL-MCNC: 100 MG/DL (ref 70–110)
GLUCOSE SERPL-MCNC: 103 MG/DL (ref 70–110)
GLUCOSE SERPL-MCNC: 110 MG/DL (ref 70–110)
GLUCOSE SERPL-MCNC: 117 MG/DL (ref 70–110)
GLUCOSE SERPL-MCNC: 118 MG/DL (ref 70–110)
GLUCOSE SERPL-MCNC: 125 MG/DL (ref 70–110)
GLUCOSE SERPL-MCNC: 74 MG/DL (ref 70–110)
GLUCOSE SERPL-MCNC: 75 MG/DL (ref 70–110)
GLUCOSE SERPL-MCNC: 75 MG/DL (ref 70–110)
GLUCOSE SERPL-MCNC: 77 MG/DL (ref 70–110)
GLUCOSE UR QL STRIP: NEGATIVE
HCT VFR BLD AUTO: 38.5 % (ref 37–48.5)
HCT VFR BLD AUTO: 39.8 % (ref 37–48.5)
HGB BLD-MCNC: 11.7 G/DL (ref 12–16)
HGB BLD-MCNC: 12.2 G/DL (ref 12–16)
HGB UR QL STRIP: NEGATIVE
HYALINE CASTS #/AREA URNS LPF: 5 /LPF
IMM GRANULOCYTES # BLD AUTO: ABNORMAL K/UL (ref 0–0.04)
IMM GRANULOCYTES # BLD AUTO: ABNORMAL K/UL (ref 0–0.04)
IMM GRANULOCYTES NFR BLD AUTO: ABNORMAL % (ref 0–0.5)
IMM GRANULOCYTES NFR BLD AUTO: ABNORMAL % (ref 0–0.5)
INR PPP: 1 (ref 0.8–1.2)
KETONES UR QL STRIP: NEGATIVE
LACTATE SERPL-SCNC: 1.8 MMOL/L (ref 0.5–1.9)
LEUKOCYTE ESTERASE UR QL STRIP: ABNORMAL
LIPASE SERPL-CCNC: 198 U/L (ref 4–60)
LYMPHOCYTES NFR BLD: 2 % (ref 18–48)
LYMPHOCYTES NFR BLD: 8 % (ref 18–48)
MAGNESIUM SERPL-MCNC: 1.7 MG/DL (ref 1.6–2.6)
MAGNESIUM SERPL-MCNC: 1.9 MG/DL (ref 1.6–2.6)
MCH RBC QN AUTO: 29.3 PG (ref 27–31)
MCH RBC QN AUTO: 29.5 PG (ref 27–31)
MCHC RBC AUTO-ENTMCNC: 30.4 G/DL (ref 32–36)
MCHC RBC AUTO-ENTMCNC: 30.7 G/DL (ref 32–36)
MCV RBC AUTO: 96 FL (ref 82–98)
MCV RBC AUTO: 97 FL (ref 82–98)
METAMYELOCYTES NFR BLD MANUAL: 1 %
MICROSCOPIC COMMENT: ABNORMAL
MONOCYTES NFR BLD: 0 % (ref 4–15)
MONOCYTES NFR BLD: 1 % (ref 4–15)
MYELOCYTES NFR BLD MANUAL: 1 %
NEUTROPHILS NFR BLD: 88 % (ref 38–73)
NEUTROPHILS NFR BLD: 90 % (ref 38–73)
NEUTS BAND NFR BLD MANUAL: 4 %
NEUTS BAND NFR BLD MANUAL: 5 %
NITRITE UR QL STRIP: NEGATIVE
NRBC BLD-RTO: 0 /100 WBC
NRBC BLD-RTO: 0 /100 WBC
PH UR STRIP: 5 [PH] (ref 5–8)
PLATELET # BLD AUTO: 164 K/UL (ref 150–450)
PLATELET # BLD AUTO: 217 K/UL (ref 150–450)
PLATELET BLD QL SMEAR: ABNORMAL
PMV BLD AUTO: 10.9 FL (ref 9.2–12.9)
PMV BLD AUTO: 11.4 FL (ref 9.2–12.9)
POTASSIUM SERPL-SCNC: 5.3 MMOL/L (ref 3.5–5.1)
POTASSIUM SERPL-SCNC: 6.3 MMOL/L (ref 3.5–5.1)
POTASSIUM SERPL-SCNC: 6.3 MMOL/L (ref 3.5–5.1)
POTASSIUM SERPL-SCNC: 6.6 MMOL/L (ref 3.5–5.1)
POTASSIUM SERPL-SCNC: 7 MMOL/L (ref 3.5–5.1)
PROT SERPL-MCNC: 5.1 G/DL (ref 6–8.4)
PROT SERPL-MCNC: 5.2 G/DL (ref 6–8.4)
PROT SERPL-MCNC: 6 G/DL (ref 6–8.4)
PROT UR QL STRIP: ABNORMAL
PROTHROMBIN TIME: 10.3 SEC (ref 9–12.5)
RBC # BLD AUTO: 3.96 M/UL (ref 4–5.4)
RBC # BLD AUTO: 4.16 M/UL (ref 4–5.4)
RBC #/AREA URNS HPF: 2 /HPF (ref 0–4)
SAMPLE: ABNORMAL
SARS-COV-2 RDRP RESP QL NAA+PROBE: NEGATIVE
SODIUM SERPL-SCNC: 134 MMOL/L (ref 136–145)
SODIUM SERPL-SCNC: 137 MMOL/L (ref 136–145)
SODIUM SERPL-SCNC: 138 MMOL/L (ref 136–145)
SP GR UR STRIP: 1.01 (ref 1–1.03)
SQUAMOUS #/AREA URNS HPF: 2 /HPF
TROPONIN I SERPL HS-MCNC: 14.9 PG/ML (ref 0–14.9)
TROPONIN I SERPL HS-MCNC: 14.9 PG/ML (ref 0–14.9)
TSH SERPL DL<=0.005 MIU/L-ACNC: 0.39 UIU/ML (ref 0.34–5.6)
URN SPEC COLLECT METH UR: ABNORMAL
UROBILINOGEN UR STRIP-ACNC: NEGATIVE EU/DL
WBC # BLD AUTO: 34.16 K/UL (ref 3.9–12.7)
WBC # BLD AUTO: 39.12 K/UL (ref 3.9–12.7)
WBC #/AREA URNS HPF: 12 /HPF (ref 0–5)

## 2023-01-16 PROCEDURE — 99900035 HC TECH TIME PER 15 MIN (STAT)

## 2023-01-16 PROCEDURE — 25000003 PHARM REV CODE 250: Performed by: NURSE ANESTHETIST, CERTIFIED REGISTERED

## 2023-01-16 PROCEDURE — 96368 THER/DIAG CONCURRENT INF: CPT

## 2023-01-16 PROCEDURE — 63600175 PHARM REV CODE 636 W HCPCS: Performed by: STUDENT IN AN ORGANIZED HEALTH CARE EDUCATION/TRAINING PROGRAM

## 2023-01-16 PROCEDURE — 83690 ASSAY OF LIPASE: CPT | Performed by: EMERGENCY MEDICINE

## 2023-01-16 PROCEDURE — 25000003 PHARM REV CODE 250: Performed by: STUDENT IN AN ORGANIZED HEALTH CARE EDUCATION/TRAINING PROGRAM

## 2023-01-16 PROCEDURE — 27201107 HC STYLET, STANDARD: Performed by: ANESTHESIOLOGY

## 2023-01-16 PROCEDURE — 83735 ASSAY OF MAGNESIUM: CPT | Performed by: EMERGENCY MEDICINE

## 2023-01-16 PROCEDURE — 25000242 PHARM REV CODE 250 ALT 637 W/ HCPCS: Performed by: STUDENT IN AN ORGANIZED HEALTH CARE EDUCATION/TRAINING PROGRAM

## 2023-01-16 PROCEDURE — 25000003 PHARM REV CODE 250

## 2023-01-16 PROCEDURE — 27000221 HC OXYGEN, UP TO 24 HOURS

## 2023-01-16 PROCEDURE — 63600175 PHARM REV CODE 636 W HCPCS: Performed by: NURSE ANESTHETIST, CERTIFIED REGISTERED

## 2023-01-16 PROCEDURE — 83605 ASSAY OF LACTIC ACID: CPT | Performed by: EMERGENCY MEDICINE

## 2023-01-16 PROCEDURE — 25500020 PHARM REV CODE 255: Performed by: INTERNAL MEDICINE

## 2023-01-16 PROCEDURE — 83735 ASSAY OF MAGNESIUM: CPT | Mod: 91 | Performed by: STUDENT IN AN ORGANIZED HEALTH CARE EDUCATION/TRAINING PROGRAM

## 2023-01-16 PROCEDURE — 94640 AIRWAY INHALATION TREATMENT: CPT

## 2023-01-16 PROCEDURE — 36415 COLL VENOUS BLD VENIPUNCTURE: CPT | Performed by: INTERNAL MEDICINE

## 2023-01-16 PROCEDURE — 85007 BL SMEAR W/DIFF WBC COUNT: CPT | Mod: 91 | Performed by: STUDENT IN AN ORGANIZED HEALTH CARE EDUCATION/TRAINING PROGRAM

## 2023-01-16 PROCEDURE — 94799 UNLISTED PULMONARY SVC/PX: CPT

## 2023-01-16 PROCEDURE — 84484 ASSAY OF TROPONIN QUANT: CPT | Mod: 91 | Performed by: EMERGENCY MEDICINE

## 2023-01-16 PROCEDURE — 94761 N-INVAS EAR/PLS OXIMETRY MLT: CPT

## 2023-01-16 PROCEDURE — 80053 COMPREHEN METABOLIC PANEL: CPT | Performed by: EMERGENCY MEDICINE

## 2023-01-16 PROCEDURE — 36415 COLL VENOUS BLD VENIPUNCTURE: CPT | Performed by: STUDENT IN AN ORGANIZED HEALTH CARE EDUCATION/TRAINING PROGRAM

## 2023-01-16 PROCEDURE — 85027 COMPLETE CBC AUTOMATED: CPT | Mod: 91 | Performed by: STUDENT IN AN ORGANIZED HEALTH CARE EDUCATION/TRAINING PROGRAM

## 2023-01-16 PROCEDURE — 43264 ERCP REMOVE DUCT CALCULI: CPT | Performed by: INTERNAL MEDICINE

## 2023-01-16 PROCEDURE — 96375 TX/PRO/DX INJ NEW DRUG ADDON: CPT

## 2023-01-16 PROCEDURE — U0002 COVID-19 LAB TEST NON-CDC: HCPCS | Performed by: EMERGENCY MEDICINE

## 2023-01-16 PROCEDURE — 87340 HEPATITIS B SURFACE AG IA: CPT | Performed by: INTERNAL MEDICINE

## 2023-01-16 PROCEDURE — 82962 GLUCOSE BLOOD TEST: CPT

## 2023-01-16 PROCEDURE — 84443 ASSAY THYROID STIM HORMONE: CPT | Performed by: EMERGENCY MEDICINE

## 2023-01-16 PROCEDURE — 85610 PROTHROMBIN TIME: CPT | Performed by: INTERNAL MEDICINE

## 2023-01-16 PROCEDURE — 25000242 PHARM REV CODE 250 ALT 637 W/ HCPCS: Performed by: EMERGENCY MEDICINE

## 2023-01-16 PROCEDURE — 63600175 PHARM REV CODE 636 W HCPCS: Performed by: EMERGENCY MEDICINE

## 2023-01-16 PROCEDURE — 85027 COMPLETE CBC AUTOMATED: CPT | Performed by: EMERGENCY MEDICINE

## 2023-01-16 PROCEDURE — 87040 BLOOD CULTURE FOR BACTERIA: CPT | Mod: 59 | Performed by: EMERGENCY MEDICINE

## 2023-01-16 PROCEDURE — 27202125 HC BALLOON, EXTRACTION (ANY): Performed by: INTERNAL MEDICINE

## 2023-01-16 PROCEDURE — 81001 URINALYSIS AUTO W/SCOPE: CPT | Performed by: EMERGENCY MEDICINE

## 2023-01-16 PROCEDURE — 86704 HEP B CORE ANTIBODY TOTAL: CPT | Performed by: INTERNAL MEDICINE

## 2023-01-16 PROCEDURE — 83880 ASSAY OF NATRIURETIC PEPTIDE: CPT | Performed by: EMERGENCY MEDICINE

## 2023-01-16 PROCEDURE — 80048 BASIC METABOLIC PNL TOTAL CA: CPT | Performed by: INTERNAL MEDICINE

## 2023-01-16 PROCEDURE — 86706 HEP B SURFACE ANTIBODY: CPT | Performed by: INTERNAL MEDICINE

## 2023-01-16 PROCEDURE — 99292 CRITICAL CARE ADDL 30 MIN: CPT

## 2023-01-16 PROCEDURE — 12000002 HC ACUTE/MED SURGE SEMI-PRIVATE ROOM

## 2023-01-16 PROCEDURE — 82962 GLUCOSE BLOOD TEST: CPT | Performed by: INTERNAL MEDICINE

## 2023-01-16 PROCEDURE — 96367 TX/PROPH/DG ADDL SEQ IV INF: CPT

## 2023-01-16 PROCEDURE — 63600175 PHARM REV CODE 636 W HCPCS: Performed by: INTERNAL MEDICINE

## 2023-01-16 PROCEDURE — 85007 BL SMEAR W/DIFF WBC COUNT: CPT | Performed by: EMERGENCY MEDICINE

## 2023-01-16 PROCEDURE — 25000003 PHARM REV CODE 250: Performed by: INTERNAL MEDICINE

## 2023-01-16 PROCEDURE — C9113 INJ PANTOPRAZOLE SODIUM, VIA: HCPCS | Performed by: EMERGENCY MEDICINE

## 2023-01-16 PROCEDURE — 87077 CULTURE AEROBIC IDENTIFY: CPT | Performed by: EMERGENCY MEDICINE

## 2023-01-16 PROCEDURE — 99900031 HC PATIENT EDUCATION (STAT)

## 2023-01-16 PROCEDURE — C1769 GUIDE WIRE: HCPCS | Performed by: INTERNAL MEDICINE

## 2023-01-16 PROCEDURE — 37000008 HC ANESTHESIA 1ST 15 MINUTES: Performed by: INTERNAL MEDICINE

## 2023-01-16 PROCEDURE — 90935 HEMODIALYSIS ONE EVALUATION: CPT

## 2023-01-16 PROCEDURE — 87086 URINE CULTURE/COLONY COUNT: CPT | Performed by: EMERGENCY MEDICINE

## 2023-01-16 PROCEDURE — 27000673 HC TUBING BLOOD Y: Performed by: ANESTHESIOLOGY

## 2023-01-16 PROCEDURE — 99291 CRITICAL CARE FIRST HOUR: CPT

## 2023-01-16 PROCEDURE — 80053 COMPREHEN METABOLIC PANEL: CPT | Mod: 91 | Performed by: STUDENT IN AN ORGANIZED HEALTH CARE EDUCATION/TRAINING PROGRAM

## 2023-01-16 PROCEDURE — 80053 COMPREHEN METABOLIC PANEL: CPT | Mod: 91 | Performed by: INTERNAL MEDICINE

## 2023-01-16 PROCEDURE — 87186 SC STD MICRODIL/AGAR DIL: CPT | Performed by: EMERGENCY MEDICINE

## 2023-01-16 PROCEDURE — 25000003 PHARM REV CODE 250: Performed by: EMERGENCY MEDICINE

## 2023-01-16 PROCEDURE — 43262 ENDO CHOLANGIOPANCREATOGRAPH: CPT | Performed by: INTERNAL MEDICINE

## 2023-01-16 PROCEDURE — 37000009 HC ANESTHESIA EA ADD 15 MINS: Performed by: INTERNAL MEDICINE

## 2023-01-16 PROCEDURE — 96365 THER/PROPH/DIAG IV INF INIT: CPT

## 2023-01-16 RX ORDER — DILTIAZEM HYDROCHLORIDE 120 MG/1
120 CAPSULE, COATED, EXTENDED RELEASE ORAL DAILY
Status: CANCELLED | OUTPATIENT
Start: 2023-01-16

## 2023-01-16 RX ORDER — CALCIUM GLUCONATE 20 MG/ML
INJECTION, SOLUTION INTRAVENOUS
Status: COMPLETED
Start: 2023-01-16 | End: 2023-01-16

## 2023-01-16 RX ORDER — GABAPENTIN 100 MG/1
100 CAPSULE ORAL NIGHTLY
Status: DISCONTINUED | OUTPATIENT
Start: 2023-01-16 | End: 2023-01-16

## 2023-01-16 RX ORDER — ATROPINE SULFATE 0.1 MG/ML
INJECTION INTRAVENOUS
Status: DISPENSED
Start: 2023-01-16 | End: 2023-01-16

## 2023-01-16 RX ORDER — ONDANSETRON 2 MG/ML
4 INJECTION INTRAMUSCULAR; INTRAVENOUS
Status: DISCONTINUED | OUTPATIENT
Start: 2023-01-16 | End: 2023-01-16

## 2023-01-16 RX ORDER — ONDANSETRON 2 MG/ML
INJECTION INTRAMUSCULAR; INTRAVENOUS
Status: DISPENSED
Start: 2023-01-16 | End: 2023-01-16

## 2023-01-16 RX ORDER — ONDANSETRON 2 MG/ML
8 INJECTION INTRAMUSCULAR; INTRAVENOUS
Status: COMPLETED | OUTPATIENT
Start: 2023-01-16 | End: 2023-01-16

## 2023-01-16 RX ORDER — HEPARIN SODIUM 5000 [USP'U]/ML
5000 INJECTION, SOLUTION INTRAVENOUS; SUBCUTANEOUS EVERY 8 HOURS
Status: DISCONTINUED | OUTPATIENT
Start: 2023-01-16 | End: 2023-01-19 | Stop reason: HOSPADM

## 2023-01-16 RX ORDER — FERROUS SULFATE 325(65) MG
325 TABLET, DELAYED RELEASE (ENTERIC COATED) ORAL DAILY
COMMUNITY

## 2023-01-16 RX ORDER — HEPARIN SODIUM 1000 [USP'U]/ML
4000 INJECTION, SOLUTION INTRAVENOUS; SUBCUTANEOUS
Status: DISCONTINUED | OUTPATIENT
Start: 2023-01-16 | End: 2023-01-16

## 2023-01-16 RX ORDER — SODIUM BICARBONATE 1 MEQ/ML
25 SYRINGE (ML) INTRAVENOUS
Status: COMPLETED | OUTPATIENT
Start: 2023-01-16 | End: 2023-01-16

## 2023-01-16 RX ORDER — SODIUM CHLORIDE 0.9 % (FLUSH) 0.9 %
3 SYRINGE (ML) INJECTION EVERY 6 HOURS PRN
Status: DISCONTINUED | OUTPATIENT
Start: 2023-01-16 | End: 2023-01-19 | Stop reason: HOSPADM

## 2023-01-16 RX ORDER — CALCIUM GLUCONATE 20 MG/ML
1 INJECTION, SOLUTION INTRAVENOUS
Status: COMPLETED | OUTPATIENT
Start: 2023-01-16 | End: 2023-01-16

## 2023-01-16 RX ORDER — HEPARIN SODIUM 1000 [USP'U]/ML
4000 INJECTION, SOLUTION INTRAVENOUS; SUBCUTANEOUS
Status: DISCONTINUED | OUTPATIENT
Start: 2023-01-16 | End: 2023-01-19 | Stop reason: HOSPADM

## 2023-01-16 RX ORDER — ONDANSETRON HYDROCHLORIDE 2 MG/ML
INJECTION, SOLUTION INTRAMUSCULAR; INTRAVENOUS
Status: DISCONTINUED | OUTPATIENT
Start: 2023-01-16 | End: 2023-01-16

## 2023-01-16 RX ORDER — CHOLESTYRAMINE 4 G/4.8G
1 POWDER, FOR SUSPENSION ORAL 2 TIMES DAILY
Status: DISCONTINUED | OUTPATIENT
Start: 2023-01-16 | End: 2023-01-16

## 2023-01-16 RX ORDER — GUAIFENESIN 600 MG/1
600 TABLET, EXTENDED RELEASE ORAL 2 TIMES DAILY
Status: DISCONTINUED | OUTPATIENT
Start: 2023-01-16 | End: 2023-01-16

## 2023-01-16 RX ORDER — ASPIRIN 81 MG/1
81 TABLET ORAL EVERY MORNING
Status: DISCONTINUED | OUTPATIENT
Start: 2023-01-16 | End: 2023-01-19 | Stop reason: HOSPADM

## 2023-01-16 RX ORDER — ALBUTEROL SULFATE 0.83 MG/ML
2.5 SOLUTION RESPIRATORY (INHALATION) EVERY 6 HOURS PRN
Status: DISCONTINUED | OUTPATIENT
Start: 2023-01-16 | End: 2023-01-19 | Stop reason: HOSPADM

## 2023-01-16 RX ORDER — PHENYLEPHRINE HYDROCHLORIDE 10 MG/ML
INJECTION INTRAVENOUS
Status: DISCONTINUED | OUTPATIENT
Start: 2023-01-16 | End: 2023-01-16

## 2023-01-16 RX ORDER — HEPARIN SODIUM 1000 [USP'U]/ML
1000 INJECTION, SOLUTION INTRAVENOUS; SUBCUTANEOUS ONCE
Status: COMPLETED | OUTPATIENT
Start: 2023-01-16 | End: 2023-01-16

## 2023-01-16 RX ORDER — DEXTROSE MONOHYDRATE 100 MG/ML
INJECTION, SOLUTION INTRAVENOUS
Status: DISPENSED
Start: 2023-01-16 | End: 2023-01-16

## 2023-01-16 RX ORDER — PROPOFOL 10 MG/ML
VIAL (ML) INTRAVENOUS
Status: DISCONTINUED | OUTPATIENT
Start: 2023-01-16 | End: 2023-01-16

## 2023-01-16 RX ORDER — ETOMIDATE 2 MG/ML
INJECTION INTRAVENOUS
Status: DISCONTINUED | OUTPATIENT
Start: 2023-01-16 | End: 2023-01-16

## 2023-01-16 RX ORDER — ONDANSETRON 2 MG/ML
4 INJECTION INTRAMUSCULAR; INTRAVENOUS EVERY 8 HOURS PRN
Status: DISCONTINUED | OUTPATIENT
Start: 2023-01-16 | End: 2023-01-19 | Stop reason: HOSPADM

## 2023-01-16 RX ORDER — ACETAMINOPHEN 500 MG
5000 TABLET ORAL DAILY
COMMUNITY
End: 2023-07-22

## 2023-01-16 RX ORDER — GLUCAGON 1 MG
1 KIT INJECTION
Status: DISCONTINUED | OUTPATIENT
Start: 2023-01-16 | End: 2023-01-16

## 2023-01-16 RX ORDER — TALC
6 POWDER (GRAM) TOPICAL NIGHTLY PRN
Status: DISCONTINUED | OUTPATIENT
Start: 2023-01-16 | End: 2023-01-19 | Stop reason: HOSPADM

## 2023-01-16 RX ORDER — CALCIUM CHLORIDE INJECTION 100 MG/ML
0.5 INJECTION, SOLUTION INTRAVENOUS
Status: DISCONTINUED | OUTPATIENT
Start: 2023-01-16 | End: 2023-01-16

## 2023-01-16 RX ORDER — PANTOPRAZOLE SODIUM 40 MG/1
40 TABLET, DELAYED RELEASE ORAL
Status: DISCONTINUED | OUTPATIENT
Start: 2023-01-16 | End: 2023-01-19 | Stop reason: HOSPADM

## 2023-01-16 RX ORDER — METOPROLOL SUCCINATE 25 MG/1
50 TABLET, EXTENDED RELEASE ORAL DAILY
Status: CANCELLED | OUTPATIENT
Start: 2023-01-16

## 2023-01-16 RX ORDER — MUPIROCIN 20 MG/G
OINTMENT TOPICAL 2 TIMES DAILY
Status: DISCONTINUED | OUTPATIENT
Start: 2023-01-16 | End: 2023-01-19 | Stop reason: HOSPADM

## 2023-01-16 RX ORDER — SODIUM CHLORIDE 9 MG/ML
INJECTION, SOLUTION INTRAVENOUS CONTINUOUS
Status: DISCONTINUED | OUTPATIENT
Start: 2023-01-16 | End: 2023-01-16

## 2023-01-16 RX ORDER — SODIUM CHLORIDE 9 MG/ML
1000 INJECTION, SOLUTION INTRAVENOUS
Status: COMPLETED | OUTPATIENT
Start: 2023-01-16 | End: 2023-01-16

## 2023-01-16 RX ORDER — ROCURONIUM BROMIDE 10 MG/ML
INJECTION, SOLUTION INTRAVENOUS
Status: DISCONTINUED | OUTPATIENT
Start: 2023-01-16 | End: 2023-01-16

## 2023-01-16 RX ORDER — FLAXSEED OIL 1000 MG
1 CAPSULE ORAL DAILY
Status: ON HOLD | COMMUNITY
End: 2023-01-19 | Stop reason: HOSPADM

## 2023-01-16 RX ORDER — INSULIN ASPART 100 [IU]/ML
0-5 INJECTION, SOLUTION INTRAVENOUS; SUBCUTANEOUS EVERY 6 HOURS PRN
Status: DISCONTINUED | OUTPATIENT
Start: 2023-01-16 | End: 2023-01-16

## 2023-01-16 RX ORDER — ALBUTEROL SULFATE 0.83 MG/ML
10 SOLUTION RESPIRATORY (INHALATION)
Status: COMPLETED | OUTPATIENT
Start: 2023-01-16 | End: 2023-01-16

## 2023-01-16 RX ORDER — LIDOCAINE HYDROCHLORIDE 20 MG/ML
INJECTION, SOLUTION EPIDURAL; INFILTRATION; INTRACAUDAL; PERINEURAL
Status: DISCONTINUED | OUTPATIENT
Start: 2023-01-16 | End: 2023-01-16

## 2023-01-16 RX ORDER — CALCIUM GLUCONATE 20 MG/ML
1 INJECTION, SOLUTION INTRAVENOUS ONCE
Status: COMPLETED | OUTPATIENT
Start: 2023-01-16 | End: 2023-01-16

## 2023-01-16 RX ORDER — IPRATROPIUM BROMIDE AND ALBUTEROL SULFATE 2.5; .5 MG/3ML; MG/3ML
3 SOLUTION RESPIRATORY (INHALATION)
Status: DISCONTINUED | OUTPATIENT
Start: 2023-01-16 | End: 2023-01-19 | Stop reason: HOSPADM

## 2023-01-16 RX ORDER — PROCHLORPERAZINE EDISYLATE 5 MG/ML
5 INJECTION INTRAMUSCULAR; INTRAVENOUS EVERY 6 HOURS PRN
Status: DISCONTINUED | OUTPATIENT
Start: 2023-01-16 | End: 2023-01-19 | Stop reason: HOSPADM

## 2023-01-16 RX ORDER — SODIUM CHLORIDE 9 MG/ML
INJECTION, SOLUTION INTRAVENOUS ONCE
Status: DISCONTINUED | OUTPATIENT
Start: 2023-01-16 | End: 2023-01-19

## 2023-01-16 RX ORDER — PANTOPRAZOLE SODIUM 40 MG/10ML
80 INJECTION, POWDER, LYOPHILIZED, FOR SOLUTION INTRAVENOUS
Status: COMPLETED | OUTPATIENT
Start: 2023-01-16 | End: 2023-01-16

## 2023-01-16 RX ORDER — VIT A/VIT C/VIT E/ZINC/COPPER 4296-226
1 CAPSULE ORAL DAILY
Status: ON HOLD | COMMUNITY
End: 2023-01-19 | Stop reason: HOSPADM

## 2023-01-16 RX ORDER — ISOSORBIDE MONONITRATE 30 MG/1
120 TABLET, EXTENDED RELEASE ORAL DAILY
Status: DISCONTINUED | OUTPATIENT
Start: 2023-01-16 | End: 2023-01-16

## 2023-01-16 RX ADMIN — PIPERACILLIN SODIUM AND TAZOBACTAM SODIUM 3.38 G: 3; .375 INJECTION, POWDER, LYOPHILIZED, FOR SOLUTION INTRAVENOUS at 08:01

## 2023-01-16 RX ADMIN — LIDOCAINE HYDROCHLORIDE 50 MG: 20 INJECTION, SOLUTION EPIDURAL; INFILTRATION; INTRACAUDAL; PERINEURAL at 10:01

## 2023-01-16 RX ADMIN — SODIUM CHLORIDE: 0.9 INJECTION, SOLUTION INTRAVENOUS at 10:01

## 2023-01-16 RX ADMIN — CALCIUM GLUCONATE 1 G: 20 INJECTION, SOLUTION INTRAVENOUS at 02:01

## 2023-01-16 RX ADMIN — PROPOFOL 30 MG: 10 INJECTION, EMULSION INTRAVENOUS at 10:01

## 2023-01-16 RX ADMIN — PANTOPRAZOLE SODIUM 80 MG: 40 INJECTION, POWDER, FOR SOLUTION INTRAVENOUS at 12:01

## 2023-01-16 RX ADMIN — DEXTROSE MONOHYDRATE 250 ML: 100 INJECTION, SOLUTION INTRAVENOUS at 02:01

## 2023-01-16 RX ADMIN — ONDANSETRON 4 MG: 2 INJECTION INTRAMUSCULAR; INTRAVENOUS at 10:01

## 2023-01-16 RX ADMIN — ETOMIDATE 12 MG: 2 INJECTION, SOLUTION INTRAVENOUS at 10:01

## 2023-01-16 RX ADMIN — ONDANSETRON 8 MG: 2 INJECTION INTRAMUSCULAR; INTRAVENOUS at 12:01

## 2023-01-16 RX ADMIN — SODIUM ZIRCONIUM CYCLOSILICATE 10 G: 5 POWDER, FOR SUSPENSION ORAL at 01:01

## 2023-01-16 RX ADMIN — IPRATROPIUM BROMIDE AND ALBUTEROL SULFATE 3 ML: 2.5; .5 SOLUTION RESPIRATORY (INHALATION) at 09:01

## 2023-01-16 RX ADMIN — IPRATROPIUM BROMIDE AND ALBUTEROL SULFATE 3 ML: 2.5; .5 SOLUTION RESPIRATORY (INHALATION) at 08:01

## 2023-01-16 RX ADMIN — MUPIROCIN: 20 OINTMENT TOPICAL at 09:01

## 2023-01-16 RX ADMIN — SODIUM ZIRCONIUM CYCLOSILICATE 10 G: 5 POWDER, FOR SUSPENSION ORAL at 03:01

## 2023-01-16 RX ADMIN — SUGAMMADEX 200 MG: 100 INJECTION, SOLUTION INTRAVENOUS at 11:01

## 2023-01-16 RX ADMIN — CALCIUM GLUCONATE 1 G: 20 INJECTION, SOLUTION INTRAVENOUS at 12:01

## 2023-01-16 RX ADMIN — DEXTROSE MONOHYDRATE 250 ML: 100 INJECTION, SOLUTION INTRAVENOUS at 11:01

## 2023-01-16 RX ADMIN — ROCURONIUM BROMIDE 50 MG: 10 INJECTION, SOLUTION INTRAVENOUS at 10:01

## 2023-01-16 RX ADMIN — HUMAN INSULIN 5 UNITS: 100 INJECTION, SOLUTION SUBCUTANEOUS at 02:01

## 2023-01-16 RX ADMIN — SODIUM BICARBONATE: 84 INJECTION, SOLUTION INTRAVENOUS at 08:01

## 2023-01-16 RX ADMIN — HEPARIN SODIUM 5000 UNITS: 5000 INJECTION INTRAVENOUS; SUBCUTANEOUS at 06:01

## 2023-01-16 RX ADMIN — SODIUM BICARBONATE: 84 INJECTION, SOLUTION INTRAVENOUS at 11:01

## 2023-01-16 RX ADMIN — PHENYLEPHRINE HYDROCHLORIDE 100 MCG: 10 INJECTION INTRAVENOUS at 10:01

## 2023-01-16 RX ADMIN — INSULIN HUMAN 10 UNITS: 100 INJECTION, SOLUTION PARENTERAL at 12:01

## 2023-01-16 RX ADMIN — HEPARIN SODIUM 4000 UNITS: 1000 INJECTION, SOLUTION INTRAVENOUS; SUBCUTANEOUS at 05:01

## 2023-01-16 RX ADMIN — PIPERACILLIN SODIUM AND TAZOBACTAM SODIUM 3.38 G: 3; .375 INJECTION, POWDER, LYOPHILIZED, FOR SOLUTION INTRAVENOUS at 03:01

## 2023-01-16 RX ADMIN — SODIUM BICARBONATE 25 MEQ: 84 INJECTION, SOLUTION INTRAVENOUS at 02:01

## 2023-01-16 RX ADMIN — ALBUTEROL SULFATE 10 MG: 2.5 SOLUTION RESPIRATORY (INHALATION) at 03:01

## 2023-01-16 RX ADMIN — HEPARIN SODIUM 5000 UNITS: 5000 INJECTION INTRAVENOUS; SUBCUTANEOUS at 10:01

## 2023-01-16 RX ADMIN — SODIUM CHLORIDE: 0.9 INJECTION, SOLUTION INTRAVENOUS at 06:01

## 2023-01-16 RX ADMIN — SODIUM CHLORIDE 1000 ML: 0.9 INJECTION, SOLUTION INTRAVENOUS at 04:01

## 2023-01-16 RX ADMIN — HEPARIN SODIUM 1000 UNITS: 1000 INJECTION, SOLUTION INTRAVENOUS; SUBCUTANEOUS at 01:01

## 2023-01-16 NOTE — CARE UPDATE
01/16/23 0311   Patient Assessment/Suction   Level of Consciousness (AVPU) alert   Respiratory Effort Normal;Unlabored   Expansion/Accessory Muscles/Retractions no use of accessory muscles   All Lung Fields Breath Sounds Anterior:;Lateral:;diminished   WILMER Breath Sounds diminished   RUL Breath Sounds diminished   Rhythm/Pattern, Respiratory unlabored   Cough Frequency infrequent   PRE-TX-O2   Device (Oxygen Therapy) nasal cannula   $ Is the patient on Low Flow Oxygen? Yes   Flow (L/min) 3   SpO2 96 %   Pulse Oximetry Type Continuous   $ Pulse Oximetry - Multiple Charge Pulse Oximetry - Multiple   Pulse 62   Resp (!) 24   Aerosol Therapy   $ Aerosol Therapy Charges Aerosol Treatment   Daily Review of Necessity (SVN) completed   Respiratory Treatment Status (SVN) given   Treatment Route (SVN) mask;oxygen   Patient Position (SVN) HOB elevated   Post Treatment Assessment (SVN) increased aeration   Signs of Intolerance (SVN) none   Breath Sounds Post-Respiratory Treatment   Post-treatment Heart Rate (beats/min) 87   Post-treatment Resp Rate (breaths/min) 20   Education   $ Education Bronchodilator;15 min   Respiratory Evaluation   $ Care Plan Tech Time 15 min

## 2023-01-16 NOTE — H&P
"[facility] - Emergency Dept  Hospital Medicine  History & Physical    Patient Name: Marisol Kerr  MRN: [id number]  Patient Class: IP- Inpatient  Admission Date: 1/15/2023  Attending Physician: Eileen Cotton MD   Primary Care Provider: Khadar Dwyer MD         Patient information was obtained from patient and ER records.     Subjective:     Principal Problem:<principal problem not specified>    Chief Complaint:   Chief Complaint   Patient presents with    Vomiting     Ems reports pt was sitting on toilet stating she felt like she had diarrhea, pt was lethargic, vomiting and was bradycardic at 40. Gcs 15    Bradycardia    Abdominal Pain     lower        HPI: 78 yo F with PMH including CAD, COPD, DM, HFpEF who presents for weakness and vomiting. Patient states she's been in her usual health until this AM when she felt weak and numb particularly her arms which states are usually the strongest. She also endorsed nausea and vomiting followed by abdominal pain after the vomiting. Emesis coffee ground in appearance. Denies fevers/chills. In the ED, patient's vitals were pertinent for HR in 40s though patient asymptomatic, received atropine en route with EMS with minimal effect. Labs reveal leukocytosis 34, k 6.6, and creat 5.3. Imaging reveal "Interval development of intrahepatic and extrahepatic pneumobilia with stranding along the common bile duct" as well as "Punctate 2 mm density in the common bile duct as described above suspicious for ductal stone." Patient started on fluids, antibiotics. Call out was placed to GI. Patient admitted to hospitalist service for further work-up and management      Past Medical History:   Diagnosis Date    CAD (coronary artery disease)     Cancer     colon    CHF (congestive heart failure)     Colitis     Colon cancer     COPD (chronic obstructive pulmonary disease)     Decreased hearing     Diabetes mellitus     Diabetes mellitus, type 2     " Hypercholesterolemia     Hypertension     Hypoxemia     Insomnia     Obesity     Osteoarthritis        Past Surgical History:   Procedure Laterality Date    ANGIOGRAM, CORONARY, WITH LEFT HEART CATHETERIZATION N/A 2/10/2022    Procedure: Angiogram, Coronary, with Left Heart Cath;  Surgeon: Cristian Benjamin MD;  Location: [facility] CATH/EP LAB;  Service: Cardiology;  Laterality: N/A;    CARDIAC CATHETERIZATION      CHOLECYSTECTOMY      COLECTOMY      CORONARY ANGIOPLASTY      CORONARY STENT PLACEMENT      EXTERNAL EAR SURGERY      EYE SURGERY      FLEXIBLE SIGMOIDOSCOPY N/A 9/19/2019    Procedure: SIGMOIDOSCOPY, FLEXIBLE;  Surgeon: Biju Kramer III, MD;  Location: [facility] ENDO;  Service: Endoscopy;  Laterality: N/A;    HYSTERECTOMY      partial       Review of patient's allergies indicates:   Allergen Reactions    Iodinated contrast media     Strawberries [strawberry] Hives and Itching       No current facility-administered medications on file prior to encounter.     Current Outpatient Medications on File Prior to Encounter   Medication Sig    albuterol (VENTOLIN HFA) 90 mcg/actuation inhaler Inhale 2 puffs into the lungs every 6 (six) hours as needed for Wheezing or Shortness of Breath. Rescue    albuterol-ipratropium (DUO-NEB) 2.5 mg-0.5 mg/3 mL nebulizer solution Take 3 mLs by nebulization every 4 (four) hours as needed for Wheezing or Shortness of Breath. Rescue    allopurinoL (ZYLOPRIM) 100 MG tablet Take 0.5 tablets (50 mg total) by mouth once daily.    amLODIPine (NORVASC) 10 MG tablet Take 0.5 tablets (5 mg total) by mouth once daily.    aspirin (ECOTRIN) 81 MG EC tablet Take 1 tablet (81 mg total) by mouth every morning.    cholestyramine (QUESTRAN) 4 gram packet Take 1 packet (4 g total) by mouth 2 (two) times daily.    coenzyme Q10 100 mg capsule Take 100 mg by mouth every morning.    colchicine (COLCRYS) 0.6 mg tablet Take 1/2 tablet by mouth every other day (Patient taking  differently: Take 0.3 mg by mouth every other day. Take 1/2 tablet by mouth every other day)    diltiaZEM (CARDIZEM CD) 120 MG Cp24 Take 1 capsule (120 mg total) by mouth once daily.    [] doxycycline (VIBRAMYCIN) 100 MG Cap Take 1 capsule (100 mg total) by mouth every 12 (twelve) hours. for 5 days    ergocalciferol (VITAMIN D2) 50,000 unit Cap Take 1 capsule (50,000 Units total) by mouth every 7 days.    fish oil-omega-3 fatty acids 300-1,000 mg capsule Take 2 capsules by mouth every morning.    fluticasone-salmeterol diskus inhaler 250-50 mcg Inhale 1 puff into the lungs 2 (two) times daily.    gabapentin (NEURONTIN) 100 MG capsule Take 1 capsule (100 mg total) by mouth every evening.    [] guaiFENesin (MUCINEX) 600 mg 12 hr tablet Take 1 tablet (600 mg total) by mouth 2 (two) times daily. for 5 days    [] guaiFENesin-codeine 100-10 mg/5 ml (TUSSI-ORGANIDIN NR)  mg/5 mL syrup Take 10 mLs by mouth every 4 (four) hours as needed for Cough.    ipratropium-albuteroL (COMBIVENT RESPIMAT)  mcg/actuation inhaler Inhale 2 puffs into the lungs every 4 (four) hours as needed for Shortness of Breath. Rescue    isosorbide mononitrate (IMDUR) 120 MG 24 hr tablet Take 1 tablet (120 mg total) by mouth once daily.    metoprolol succinate (TOPROL-XL) 50 MG 24 hr tablet Take 1 tablet (50 mg total) by mouth once daily.    nitroGLYCERIN 0.4 MG/DOSE TL SPRY (NITROLINGUAL) 400 mcg/spray spray Place 1 spray under the tongue every 5 (five) minutes as needed for Chest pain (max of 3 doses).    pantoprazole (PROTONIX) 40 MG tablet Take 1 tablet (40 mg total) by mouth once daily.    [] predniSONE (DELTASONE) 20 MG tablet Take 2 tablets (40 mg total) by mouth once daily. for 5 days    ranolazine (RANEXA) 500 MG Tb12 Take 1 tablet (500 mg total) by mouth 2 (two) times daily.    temazepam (RESTORIL) 15 mg Cap Take 1 capsule (15 mg total) by mouth once daily at 6am.    torsemide (DEMADEX)  10 MG Tab Take 1 tablet (10 mg total) by mouth once daily.    traMADoL (ULTRAM) 50 mg tablet Take 1 tablet (50 mg total) by mouth 2 (two) times daily as needed for Pain.    triamcinolone acetonide 0.1% (KENALOG) 0.1 % cream Apply topically 2 (two) times daily. (Patient taking differently: Apply 1 g topically 2 (two) times daily.)    umeclidinium (INCRUSE ELLIPTA) 62.5 mcg/actuation inhalation capsule Inhale 62.5 mcg into the lungs every morning. Controller    vit C/E/Zn/coppr/lutein/zeaxan (PRESERVISION AREDS-2 ORAL) Take 1 tablet by mouth once daily.    [DISCONTINUED] benazepriL (LOTENSIN) 40 MG tablet Take 1 tablet (40 mg total) by mouth once daily.    [DISCONTINUED] cimetidine (TAGAMET) 300 MG tablet Take 1 tablet (300 mg total) by mouth every 6 (six) hours. Take 1 tablet (300 mg total) by mouth starting at 6 PM on 2022 (the evening before your angiogram procedure). Then take 1 tablet at 12 AM, then take 1 tablet at 6 AM on .    [DISCONTINUED] furosemide (LASIX) 20 MG tablet Take 2 tablets (40 mg total) by mouth once daily.    [DISCONTINUED] lisinopriL 10 MG tablet Take 1 tablet (10 mg total) by mouth once daily. HOLD UNTIL OTHERWISE DIRECTED BY PRIMARY CARE PROVIDER    [DISCONTINUED] lovastatin (MEVACOR) 40 MG tablet Take 1 tablet (40 mg total) by mouth every other day.    [DISCONTINUED] ticagrelor (BRILINTA) 90 mg tablet Take 1 tablet (90 mg total) by mouth 2 (two) times a day.     Family History       Problem Relation (Age of Onset)    Febrile seizures Mother    Heart failure Father          Tobacco Use    Smoking status: Former     Packs/day: 3.00     Years: 50.00     Pack years: 150.00     Types: Cigarettes     Start date: 3/30/1964     Quit date: 2006     Years since quittin.8    Smokeless tobacco: Never    Tobacco comments:     ex smoker 15 years   Substance and Sexual Activity    Alcohol use: Yes    Drug use: No    Sexual activity: Never      Review of Systems   Constitutional:  Negative for chills and fever.   HENT:  Negative for hearing loss and sore throat.    Eyes:  Negative for pain and redness.   Respiratory:  Positive for cough. Negative for shortness of breath.    Cardiovascular:  Negative for chest pain and palpitations.   Gastrointestinal:  Positive for abdominal pain, diarrhea and nausea.   Musculoskeletal:  Positive for gait problem. Negative for back pain.   Skin:  Negative for rash and wound.   Neurological:  Negative for dizziness and headaches.   Psychiatric/Behavioral:  Negative for confusion and hallucinations.    Objective:     Vital Signs (Most Recent):  Temp: 97.6 °F (36.4 °C) (01/16/23 0006)  Pulse: 62 (01/16/23 0330)  Resp: (!) 22 (01/16/23 0330)  BP: (!) 111/52 (01/16/23 0330)  SpO2: 95 % (01/16/23 0330)   Vital Signs (24h Range):  Temp:  [97.6 °F (36.4 °C)] 97.6 °F (36.4 °C)  Pulse:  [44-65] 62  Resp:  [16-24] 22  SpO2:  [92 %-99 %] 95 %  BP: (107-129)/(52-58) 111/52     Weight: 84.4 kg (186 lb)  Body mass index is 32.95 kg/m².    Physical Exam  Constitutional:       Appearance: She is ill-appearing.   HENT:      Head: Normocephalic and atraumatic.   Eyes:      Extraocular Movements: Extraocular movements intact.      Conjunctiva/sclera: Conjunctivae normal.   Cardiovascular:      Rate and Rhythm: Normal rate and regular rhythm.   Pulmonary:      Breath sounds: Wheezing present.   Abdominal:      Palpations: Abdomen is soft.      Tenderness: There is abdominal tenderness.   Musculoskeletal:      Cervical back: Neck supple. No tenderness.      Right lower leg: No edema.      Left lower leg: No edema.      Comments: Weakness all extremities   Skin:     General: Skin is warm.      Comments: Patches of erythema of legs   Neurological:      General: No focal deficit present.      Mental Status: She is alert.      Sensory: No sensory deficit.   Psychiatric:         Mood and Affect: Mood normal.         Judgment: Judgment normal.            Significant Labs: All pertinent labs within the past 24 hours have been reviewed.    Significant Imaging: I have reviewed all pertinent imaging results/findings within the past 24 hours.    Assessment/Plan:   ISSA likely prerenal  Hyperkalemia likely due to above  Sinus mila  Choledocholithaisis   Intrahepatic and extrahepatic pneumobilia on CT  Leukocytosis due to above condition, rule out cholangitis  Weakness due to acute on chronic deconditioning  CAD  COPD  DM  HFpEF    -Fluids  -Zosyn  -Trend labs  -Pain and nausea control  -GI consult  -Nephro consult  -S/p hyperkalemia cocktail and lokelma  -Repeat K with AM labs  -Telemetry  -Bradycardia improving   -Resume home meds as able, holding nephrotoxic agents as well as beta blockers and juaquin blocking agents at this time    FULL CODE  DVT ppx HSQ     Eileen Cotton MD  Department of Hospital Medicine   [facility] - Emergency Dept

## 2023-01-16 NOTE — PROGRESS NOTES
Spoke with FOELIA Mendez in recovery regarding pt's K and need for emergent trialysis line. RN to relay message.

## 2023-01-16 NOTE — CONSULTS
GASTROENTEROLOGY INPATIENT CONSULT NOTE  Patient Name: Marisol Kerr  Patient MRN: [id number]  Patient : 1945    Admit Date: 1/15/2023  Service date: 2023    Reason for Consult: cholangitis    PCP: Khadar Dwyer MD    Chief Complaint   Patient presents with    Vomiting     Ems reports pt was sitting on toilet stating she felt like she had diarrhea, pt was lethargic, vomiting and was bradycardic at 40. Gcs 15    Bradycardia    Abdominal Pain     lower       HPI: Patient is a 77 y.o. female with PMHx colon cancer, CAD/CHF (ASA), COPD, DM, obesity, cholecystectomy, hysterectomy, CKD, progressive deconditioning presents for evaluation of abdominal pain and chills. Acute onset, intermittent, progressive on admission. States had GB out in past. Admitted w/ severely elevated WBC w/ CT & RUQ u/s w/ biliary inflammation / pneumobilia and stone.       Past Medical History:  Past Medical History:   Diagnosis Date    CAD (coronary artery disease)     Cancer     colon    CHF (congestive heart failure)     Colitis     Colon cancer     COPD (chronic obstructive pulmonary disease)     Decreased hearing     Diabetes mellitus     Diabetes mellitus, type 2     Hypercholesterolemia     Hypertension     Hypoxemia     Insomnia     Obesity     Osteoarthritis         Past Surgical History:  Past Surgical History:   Procedure Laterality Date    ANGIOGRAM, CORONARY, WITH LEFT HEART CATHETERIZATION N/A 2/10/2022    Procedure: Angiogram, Coronary, with Left Heart Cath;  Surgeon: Cristian Benjamin MD;  Location: [facility] CATH/EP LAB;  Service: Cardiology;  Laterality: N/A;    CARDIAC CATHETERIZATION      CHOLECYSTECTOMY      COLECTOMY      CORONARY ANGIOPLASTY      CORONARY STENT PLACEMENT      EXTERNAL EAR SURGERY      EYE SURGERY      FLEXIBLE SIGMOIDOSCOPY N/A 2019    Procedure: SIGMOIDOSCOPY, FLEXIBLE;  Surgeon: Biju Kramer III, MD;  Location: [facility] ENDO;  Service: Endoscopy;  Laterality: N/A;    HYSTERECTOMY       partial        Home Medications:  Medications Prior to Admission   Medication Sig Dispense Refill Last Dose    albuterol (VENTOLIN HFA) 90 mcg/actuation inhaler Inhale 2 puffs into the lungs every 6 (six) hours as needed for Wheezing or Shortness of Breath. Rescue 36 g 3 1/15/2023    albuterol-ipratropium (DUO-NEB) 2.5 mg-0.5 mg/3 mL nebulizer solution Take 3 mLs by nebulization every 4 (four) hours as needed for Wheezing or Shortness of Breath. Rescue 120 each 6 1/15/2023    allopurinoL (ZYLOPRIM) 100 MG tablet Take 0.5 tablets (50 mg total) by mouth once daily. 45 tablet 1 1/15/2023    amLODIPine (NORVASC) 10 MG tablet Take 0.5 tablets (5 mg total) by mouth once daily. 15 tablet 11 1/15/2023    aspirin (ECOTRIN) 81 MG EC tablet Take 1 tablet (81 mg total) by mouth every morning. 90 tablet 3 1/15/2023    cholecalciferol, vitamin D3, (VITAMIN D3) 125 mcg (5,000 unit) Tab Take 5,000 Units by mouth once daily.   1/15/2023    coenzyme Q10 100 mg capsule Take 100 mg by mouth every morning.   1/15/2023    diltiaZEM (CARDIZEM CD) 120 MG Cp24 Take 1 capsule (120 mg total) by mouth once daily. 90 capsule 1 1/15/2023    ergocalciferol (VITAMIN D2) 50,000 unit Cap Take 1 capsule (50,000 Units total) by mouth every 7 days. 12 capsule 1 Past Week    ferrous sulfate 325 (65 FE) MG EC tablet Take 325 mg by mouth once daily.   1/15/2023    fish oil-omega-3 fatty acids 300-1,000 mg capsule Take 2 capsules by mouth every morning.   1/15/2023    flaxseed oiL 1,000 mg Cap Take 1 capsule by mouth once daily.   1/15/2023    fluticasone-salmeterol diskus inhaler 250-50 mcg Inhale 1 puff into the lungs 2 (two) times daily. 3 each 1 1/15/2023    gabapentin (NEURONTIN) 100 MG capsule Take 1 capsule (100 mg total) by mouth every evening. 90 capsule 1 1/15/2023    ipratropium-albuteroL (COMBIVENT RESPIMAT)  mcg/actuation inhaler Inhale 2 puffs into the lungs every 4 (four) hours as needed for Shortness of Breath. Rescue 12 g 3 1/15/2023     isosorbide mononitrate (IMDUR) 120 MG 24 hr tablet Take 1 tablet (120 mg total) by mouth once daily. 90 tablet 1 1/15/2023    metoprolol succinate (TOPROL-XL) 50 MG 24 hr tablet Take 1 tablet (50 mg total) by mouth once daily. 90 tablet 1 1/15/2023    nitroGLYCERIN 0.4 MG/DOSE TL SPRY (NITROLINGUAL) 400 mcg/spray spray Place 1 spray under the tongue every 5 (five) minutes as needed for Chest pain (max of 3 doses). 4.9 g 1 Past Month    pantoprazole (PROTONIX) 40 MG tablet Take 1 tablet (40 mg total) by mouth once daily. 90 tablet 1 1/15/2023    polycarbophil (FIBERCON) 625 mg tablet Take 625 mg by mouth once daily.   1/15/2023    ranolazine (RANEXA) 500 MG Tb12 Take 1 tablet (500 mg total) by mouth 2 (two) times daily. 180 tablet 1 1/15/2023    torsemide (DEMADEX) 10 MG Tab Take 1 tablet (10 mg total) by mouth once daily. 90 tablet 1 1/15/2023    triamcinolone acetonide 0.1% (KENALOG) 0.1 % cream Apply topically 2 (two) times daily. (Patient taking differently: Apply 1 g topically 2 (two) times daily.) 453 g 1 1/15/2023    umeclidinium (INCRUSE ELLIPTA) 62.5 mcg/actuation inhalation capsule Inhale 62.5 mcg into the lungs every morning. Controller 30 each 11 1/15/2023    vit C/E/Zn/coppr/lutein/zeaxan (PRESERVISION AREDS-2 ORAL) Take 1 tablet by mouth once daily.   1/15/2023    vitamins A,C,E-zinc-copper (ICAPS AREDS) 14,320-226-200 unit-mg-unit Cap Take 1 capsule by mouth once daily.   1/15/2023    cholestyramine (QUESTRAN) 4 gram packet Take 1 packet (4 g total) by mouth 2 (two) times daily. (Patient not taking: Reported on 2023) 180 packet 3 Not Taking    colchicine (COLCRYS) 0.6 mg tablet Take 1/2 tablet by mouth every other day (Patient not taking: Reported on 2023) 15 tablet 11 Not Taking    [] doxycycline (VIBRAMYCIN) 100 MG Cap Take 1 capsule (100 mg total) by mouth every 12 (twelve) hours. for 5 days 10 capsule 0     [] guaiFENesin (MUCINEX) 600 mg 12 hr tablet Take 1 tablet (600  mg total) by mouth 2 (two) times daily. for 5 days (Patient not taking: Reported on 2023) 10 tablet 0 Not Taking    [] guaiFENesin-codeine 100-10 mg/5 ml (TUSSI-ORGANIDIN NR)  mg/5 mL syrup Take 10 mLs by mouth every 4 (four) hours as needed for Cough. (Patient not taking: Reported on 2023) 118 mL 0 Not Taking    [] predniSONE (DELTASONE) 20 MG tablet Take 2 tablets (40 mg total) by mouth once daily. for 5 days (Patient not taking: Reported on 2023) 10 tablet 0 Not Taking    temazepam (RESTORIL) 15 mg Cap Take 1 capsule (15 mg total) by mouth once daily at 6am. (Patient not taking: Reported on 2023) 30 capsule 4 Not Taking    traMADoL (ULTRAM) 50 mg tablet Take 1 tablet (50 mg total) by mouth 2 (two) times daily as needed for Pain. (Patient not taking: Reported on 2023) 30 tablet 0 Not Taking       Inpatient Medications:   albuterol-ipratropium  3 mL Nebulization Q6H WAKE    aspirin  81 mg Oral QAM    atropine        cholestyramine-aspartame  1 packet Oral BID    gabapentin  100 mg Oral QHS    heparin (porcine)  5,000 Units Subcutaneous Q8H    pantoprazole  40 mg Oral Before breakfast    piperacillin-tazobactam (ZOSYN) IVPB  3.375 g Intravenous Q12H     albuterol, dextrose 10%, dextrose 10%, dextrose 10%, dextrose 10%, glucagon (human recombinant), insulin aspart U-100, melatonin, ondansetron, prochlorperazine, sodium chloride 0.9%    Review of patient's allergies indicates:   Allergen Reactions    Iodinated contrast media     Strawberries [strawberry] Hives and Itching       Social History:   Social History     Occupational History    Not on file   Tobacco Use    Smoking status: Former     Packs/day: 3.00     Years: 50.00     Pack years: 150.00     Types: Cigarettes     Start date: 3/30/1964     Quit date: 2006     Years since quittin.8    Smokeless tobacco: Never    Tobacco comments:     ex smoker 15 years   Substance and Sexual Activity    Alcohol use: Yes     Drug use: No    Sexual activity: Never       Family History:   Family History   Problem Relation Age of Onset    Febrile seizures Mother     Heart failure Father        Review of Systems:  A 10 point review of systems was performed and was normal, except as mentioned in the HPI, including constitutional, HEENT, heme, lymph, cardiovascular, respiratory, gastrointestinal, genitourinary, neurologic, endocrine, psychiatric and musculoskeletal.      OBJECTIVE:    Physical Exam:  24 Hour Vital Sign Ranges: Temp:  [97.6 °F (36.4 °C)-98.2 °F (36.8 °C)] 98.1 °F (36.7 °C)  Pulse:  [44-67] 67  Resp:  [16-24] 22  SpO2:  [92 %-99 %] 98 %  BP: (107-129)/(50-58) 107/50  Most recent vitals: BP (!) 107/50 (BP Location: Right arm, Patient Position: Lying)   Pulse 67   Temp 98.1 °F (36.7 °C) (Oral)   Resp (!) 22   Wt 84.4 kg (186 lb)   SpO2 98%   Breastfeeding No   BMI 32.95 kg/m²    GEN: acute / chronic ill appearing, toxic appearing adult  HEENT: PERRL, sclera anicteric, oral mucosa pink and moist without lesion  NECK: trachea midline; Good ROM  CV: regular rate and rhythm, no murmurs or gallops  RESP: coarse; moving air  ABD: soft, min-tender, non-distended, normal bowel sounds  EXT: no swelling or edema, 1+ pulses distally  SKIN: no rashes or jaundice  PSYCH: normal affect    Labs:   Recent Labs     01/16/23  0025 01/16/23  0750   WBC 34.16* 39.12*   MCV 96 97    164     Recent Labs     01/16/23  0025 01/16/23  0750   * 134*  134*   K 6.6* 6.3*  6.3*    99  99   CO2 19* 18*  18*   *  --     75  75     No results for input(s): ALB in the last 72 hours.    Invalid input(s): ALKP, SGOT, SGPT, TBIL, DBIL, TPRO  No results for input(s): PT, INR, PTT in the last 72 hours.      Radiology Review:  US Abdomen Limited   Final Result      CT Abdomen Pelvis  Without Contrast   Final Result      X-Ray Chest AP Portable   Final Result      FL ERCP Biliary And Pancreatic By Rad Tech    (Results  Pending)         IMPRESSION / RECOMMENDATIONS:  77 y.o. female with PMHx colon cancer, CAD/CHF (ASA), COPD, DM, obesity, cholecystectomy, hysterectomy, CKD, progressive deconditioning presents for evaluation of abdominal pain and chills w/ cholangitis. RIsks, benefits, alternatives discussed in detail regarding upcoming procedures and sedation and possible complications. Some of the more common endoscopic complications include but not limited to immediate or delayed perforation, bleeding, infections, pain, inadvertent injury to surrounding tissue / organs ev pancreas and possible need for surgical evaluation. All questions answered and will proceed with procedure as planned. No indocin secondary to renal failure.     -Emergent ERCP w/ anticipated sphincterotomy / stone extraction +/- stenting.   .     Thank you for this consult.    Biju ROJAS Dauterive III  1/16/2023  10:09 AM

## 2023-01-16 NOTE — CONSULTS
"Nephrology Consult Note        Patient Name: Marisol Kerr  MRN: [id number]    Patient Class: IP- Inpatient   Admission Date: 1/15/2023  Length of Stay: 0 days  Date of Service: 1/16/2023    Attending Physician: Eileen Cotton MD  Primary Care Provider: Khadar Dwyer MD    Reason for Consult: ladan/hyperkalemia    SUBJECTIVE:     HPI: 77F with CAD, COPD, DM, HFpEF presents for weakness and vomiting. Patient states she's been in her usual health until the AM of admission when she felt weak and numb, particularly her arms which states are usually the strongest. She also endorsed nausea and vomiting, followed by abdominal pain. Emesis coffee ground in appearance. Denies fevers/chills. In the ED, patient's vitals were pertinent for HR in 40s though patient asymptomatic, received atropine en route with EMS with minimal effect. Labs reveal leukocytosis 34, k 6.6, creat 5.3, baseline around 1. Imaging reveal "Interval development of intrahepatic and extrahepatic pneumobilia with stranding along the common bile duct" as well as "Punctate 2 mm density in the common bile duct as described above suspicious for ductal stone." Kidneys and bladder are not obstructed. Patient started on fluids, antibiotics. Call out was placed to GI. Received temporizing therapy with ca gluconate, lokelma, albuterol. Repeat labs are pending.    Addendum @ 11:12 AM D/w floor nurse on 1100. Patient was apparently intubated and went for a procedure with GI. Repeat K I still very high and it seems she did not respond to any of the temporizing measures administered in ER. Per nurse there was some urine in her daneil prior to departure for procedure. I instructed the nurse to consult anesthesiology for line placement for emergency dialysis and as soon as she is out of the procedure, we will have to repeat stat labs and do more temporizing measures until we can do dialysis.    Addendum @ 4:10 PM Seen on HD today, tolerating well. Making some " urine.    Past Medical History:   Diagnosis Date    CAD (coronary artery disease)     Cancer     colon    CHF (congestive heart failure)     Colitis     Colon cancer     COPD (chronic obstructive pulmonary disease)     Decreased hearing     Diabetes mellitus     Diabetes mellitus, type 2     Hypercholesterolemia     Hypertension     Hypoxemia     Insomnia     Obesity     Osteoarthritis      Past Surgical History:   Procedure Laterality Date    ANGIOGRAM, CORONARY, WITH LEFT HEART CATHETERIZATION N/A 2/10/2022    Procedure: Angiogram, Coronary, with Left Heart Cath;  Surgeon: Cristian Benjamin MD;  Location: [facility] CATH/EP LAB;  Service: Cardiology;  Laterality: N/A;    CARDIAC CATHETERIZATION      CHOLECYSTECTOMY      COLECTOMY      CORONARY ANGIOPLASTY      CORONARY STENT PLACEMENT      EXTERNAL EAR SURGERY      EYE SURGERY      FLEXIBLE SIGMOIDOSCOPY N/A 2019    Procedure: SIGMOIDOSCOPY, FLEXIBLE;  Surgeon: Biju Kramer III, MD;  Location: [facility] ENDO;  Service: Endoscopy;  Laterality: N/A;    HYSTERECTOMY      partial     Family History   Problem Relation Age of Onset    Febrile seizures Mother     Heart failure Father      Social History     Tobacco Use    Smoking status: Former     Packs/day: 3.00     Years: 50.00     Pack years: 150.00     Types: Cigarettes     Start date: 3/30/1964     Quit date: 2006     Years since quittin.8    Smokeless tobacco: Never    Tobacco comments:     ex smoker 15 years   Substance Use Topics    Alcohol use: Yes    Drug use: No       Review of patient's allergies indicates:   Allergen Reactions    Iodinated contrast media     Strawberries [strawberry] Hives and Itching       Outpatient meds:  No current facility-administered medications on file prior to encounter.     Current Outpatient Medications on File Prior to Encounter   Medication Sig Dispense Refill    albuterol (VENTOLIN HFA) 90 mcg/actuation inhaler Inhale 2 puffs into the lungs every 6 (six) hours as  needed for Wheezing or Shortness of Breath. Rescue 36 g 3    albuterol-ipratropium (DUO-NEB) 2.5 mg-0.5 mg/3 mL nebulizer solution Take 3 mLs by nebulization every 4 (four) hours as needed for Wheezing or Shortness of Breath. Rescue 120 each 6    allopurinoL (ZYLOPRIM) 100 MG tablet Take 0.5 tablets (50 mg total) by mouth once daily. 45 tablet 1    amLODIPine (NORVASC) 10 MG tablet Take 0.5 tablets (5 mg total) by mouth once daily. 15 tablet 11    aspirin (ECOTRIN) 81 MG EC tablet Take 1 tablet (81 mg total) by mouth every morning. 90 tablet 3    cholecalciferol, vitamin D3, (VITAMIN D3) 125 mcg (5,000 unit) Tab Take 5,000 Units by mouth once daily.      coenzyme Q10 100 mg capsule Take 100 mg by mouth every morning.      diltiaZEM (CARDIZEM CD) 120 MG Cp24 Take 1 capsule (120 mg total) by mouth once daily. 90 capsule 1    ergocalciferol (VITAMIN D2) 50,000 unit Cap Take 1 capsule (50,000 Units total) by mouth every 7 days. 12 capsule 1    ferrous sulfate 325 (65 FE) MG EC tablet Take 325 mg by mouth once daily.      fish oil-omega-3 fatty acids 300-1,000 mg capsule Take 2 capsules by mouth every morning.      flaxseed oiL 1,000 mg Cap Take 1 capsule by mouth once daily.      fluticasone-salmeterol diskus inhaler 250-50 mcg Inhale 1 puff into the lungs 2 (two) times daily. 3 each 1    gabapentin (NEURONTIN) 100 MG capsule Take 1 capsule (100 mg total) by mouth every evening. 90 capsule 1    ipratropium-albuteroL (COMBIVENT RESPIMAT)  mcg/actuation inhaler Inhale 2 puffs into the lungs every 4 (four) hours as needed for Shortness of Breath. Rescue 12 g 3    isosorbide mononitrate (IMDUR) 120 MG 24 hr tablet Take 1 tablet (120 mg total) by mouth once daily. 90 tablet 1    metoprolol succinate (TOPROL-XL) 50 MG 24 hr tablet Take 1 tablet (50 mg total) by mouth once daily. 90 tablet 1    nitroGLYCERIN 0.4 MG/DOSE TL SPRY (NITROLINGUAL) 400 mcg/spray spray Place 1 spray under the tongue every 5 (five) minutes as  needed for Chest pain (max of 3 doses). 4.9 g 1    pantoprazole (PROTONIX) 40 MG tablet Take 1 tablet (40 mg total) by mouth once daily. 90 tablet 1    polycarbophil (FIBERCON) 625 mg tablet Take 625 mg by mouth once daily.      ranolazine (RANEXA) 500 MG Tb12 Take 1 tablet (500 mg total) by mouth 2 (two) times daily. 180 tablet 1    torsemide (DEMADEX) 10 MG Tab Take 1 tablet (10 mg total) by mouth once daily. 90 tablet 1    triamcinolone acetonide 0.1% (KENALOG) 0.1 % cream Apply topically 2 (two) times daily. (Patient taking differently: Apply 1 g topically 2 (two) times daily.) 453 g 1    umeclidinium (INCRUSE ELLIPTA) 62.5 mcg/actuation inhalation capsule Inhale 62.5 mcg into the lungs every morning. Controller 30 each 11    vit C/E/Zn/coppr/lutein/zeaxan (PRESERVISION AREDS-2 ORAL) Take 1 tablet by mouth once daily.      vitamins A,C,E-zinc-copper (ICAPS AREDS) 14,320-226-200 unit-mg-unit Cap Take 1 capsule by mouth once daily.      cholestyramine (QUESTRAN) 4 gram packet Take 1 packet (4 g total) by mouth 2 (two) times daily. (Patient not taking: Reported on 2023) 180 packet 3    colchicine (COLCRYS) 0.6 mg tablet Take 1/2 tablet by mouth every other day (Patient not taking: Reported on 2023) 15 tablet 11    [] doxycycline (VIBRAMYCIN) 100 MG Cap Take 1 capsule (100 mg total) by mouth every 12 (twelve) hours. for 5 days 10 capsule 0    [] guaiFENesin (MUCINEX) 600 mg 12 hr tablet Take 1 tablet (600 mg total) by mouth 2 (two) times daily. for 5 days (Patient not taking: Reported on 2023) 10 tablet 0    [] guaiFENesin-codeine 100-10 mg/5 ml (TUSSI-ORGANIDIN NR)  mg/5 mL syrup Take 10 mLs by mouth every 4 (four) hours as needed for Cough. (Patient not taking: Reported on 2023) 118 mL 0    [] predniSONE (DELTASONE) 20 MG tablet Take 2 tablets (40 mg total) by mouth once daily. for 5 days (Patient not taking: Reported on 2023) 10 tablet 0    temazepam  (RESTORIL) 15 mg Cap Take 1 capsule (15 mg total) by mouth once daily at 6am. (Patient not taking: Reported on 1/16/2023) 30 capsule 4    traMADoL (ULTRAM) 50 mg tablet Take 1 tablet (50 mg total) by mouth 2 (two) times daily as needed for Pain. (Patient not taking: Reported on 1/16/2023) 30 tablet 0    [DISCONTINUED] benazepriL (LOTENSIN) 40 MG tablet Take 1 tablet (40 mg total) by mouth once daily. 90 tablet 1    [DISCONTINUED] cimetidine (TAGAMET) 300 MG tablet Take 1 tablet (300 mg total) by mouth every 6 (six) hours. Take 1 tablet (300 mg total) by mouth starting at 6 PM on Sunday April 17, 2022 (the evening before your angiogram procedure). Then take 1 tablet at 12 AM, then take 1 tablet at 6 AM on Monday April 18th. 3 tablet 0    [DISCONTINUED] furosemide (LASIX) 20 MG tablet Take 2 tablets (40 mg total) by mouth once daily. 45 tablet 1    [DISCONTINUED] lisinopriL 10 MG tablet Take 1 tablet (10 mg total) by mouth once daily. HOLD UNTIL OTHERWISE DIRECTED BY PRIMARY CARE PROVIDER 90 tablet 3    [DISCONTINUED] lovastatin (MEVACOR) 40 MG tablet Take 1 tablet (40 mg total) by mouth every other day. 45 tablet 3    [DISCONTINUED] ticagrelor (BRILINTA) 90 mg tablet Take 1 tablet (90 mg total) by mouth 2 (two) times a day. 180 tablet 1       Scheduled meds:   albuterol-ipratropium  3 mL Nebulization Q6H WAKE    allopurinoL  50 mg Oral Daily    aspirin  81 mg Oral QAM    atropine        cholestyramine-aspartame  1 packet Oral BID    gabapentin  100 mg Oral QHS    guaiFENesin  600 mg Oral BID    heparin (porcine)  5,000 Units Subcutaneous Q8H    isosorbide mononitrate  120 mg Oral Daily    pantoprazole  40 mg Oral Before breakfast    piperacillin-tazobactam (ZOSYN) IVPB  3.375 g Intravenous Q12H       Infusions:   sodium chloride 0.9% 75 mL/hr at 01/16/23 0600       PRN meds:  albuterol, dextrose 10%, dextrose 10%, dextrose 10%, dextrose 10%, glucagon (human recombinant), insulin aspart U-100, melatonin, ondansetron,  prochlorperazine, sodium chloride 0.9%    Review of Systems:  Constitutional:  Negative for chills, fever, malaise/fatigue and weight loss.   HENT:  Negative for hearing loss and nosebleeds.    Eyes:  Negative for blurred vision, double vision and photophobia.   Respiratory:  Negative for cough, shortness of breath and wheezing.    Cardiovascular:  Negative for chest pain, palpitations and leg swelling.   Gastrointestinal:  Negative for abdominal pain, constipation, diarrhea, heartburn, nausea and vomiting.   Genitourinary:  Negative for dysuria, frequency and urgency.   Musculoskeletal:  Negative for falls, joint pain and myalgias.   Skin:  Negative for itching and rash.   Neurological:  Negative for dizziness, speech change, focal weakness, loss of consciousness and headaches.   Endo/Heme/Allergies:  Does not bruise/bleed easily.   Psychiatric/Behavioral:  Negative for depression and substance abuse. The patient is not nervous/anxious.      OBJECTIVE:     Vital Signs and IO:  Temp:  [97.6 °F (36.4 °C)-98.2 °F (36.8 °C)]   Pulse:  [44-65]   Resp:  [16-24]   BP: (107-129)/(52-58)   SpO2:  [92 %-99 %]   I/O last 3 completed shifts:  In: 350 [IV Piggyback:350]  Out: -   Wt Readings from Last 5 Encounters:   01/16/23 84.4 kg (186 lb)   01/10/23 79.7 kg (175 lb 12.8 oz)   11/03/22 83.5 kg (184 lb)   10/31/22 83.7 kg (184 lb 9.6 oz)   06/30/22 82 kg (180 lb 12.4 oz)     Body mass index is 32.95 kg/m².    Physical Exam  Constitutional:       General: She is not in acute distress.     Appearance: She is well-developed. She is not diaphoretic.   HENT:      Head: Normocephalic and atraumatic.      Mouth/Throat:      Mouth: Mucous membranes are moist.   Eyes:      General: No scleral icterus.     Pupils: Pupils are equal, round, and reactive to light.   Cardiovascular:      Rate and Rhythm: Normal rate and regular rhythm.   Pulmonary:      Effort: Pulmonary effort is normal. No respiratory distress.      Breath sounds: No  stridor.   Abdominal:      General: There is no distension.      Palpations: Abdomen is soft.   Musculoskeletal:         General: No deformity. Normal range of motion.      Cervical back: Neck supple.   Skin:     General: Skin is warm and dry.      Findings: No rash present. No erythema.   Neurological:      Mental Status: She is alert and oriented to person, place, and time.      Cranial Nerves: No cranial nerve deficit.   Psychiatric:         Behavior: Behavior normal.     Laboratory:  Recent Labs   Lab 01/09/23  1301 01/10/23  0414 01/16/23  0025    142 134*   K 4.1 4.5 6.6*    100 100   CO2 28 33* 19*   BUN 27* 26* 115*   CREATININE 1.4 1.4 5.3*   * 180* 103       Recent Labs   Lab 01/09/23  1301 01/10/23  0414 01/16/23  0025   CALCIUM 8.9 9.5 7.4*   ALBUMIN 3.1* 3.1* 3.1*   MG 1.9 1.9 1.9             No results for input(s): POCTGLUCOSE in the last 168 hours.    Recent Labs   Lab 10/26/22  1415 11/02/22  1305 01/06/23  1408   Hemoglobin A1C 5.1 4.9 4.8       Recent Labs   Lab 01/09/23  1301 01/10/23  0414 01/16/23  0025   WBC 11.52 8.25 34.16*   HGB 11.7* 12.0 12.2   HCT 38.3 38.8 39.8    210 217   MCV 96 96 96   MCHC 30.5* 30.9* 30.7*   MONO 6.7  0.8 0.7*  0.1* 0.0*       Recent Labs   Lab 01/09/23  1301 01/10/23  0414 01/16/23  0025   BILITOT 0.4 0.5 1.0   PROT 6.9 7.1 6.0   ALBUMIN 3.1* 3.1* 3.1*   ALKPHOS 75 81 76   ALT 14 16 17   AST 20 22 21       Recent Labs   Lab 09/24/21  1308 02/24/22  0710 01/09/23  1328 01/16/23  0401   Color, UA Yellow Yellow Yellow Yellow   Appearance, UA Clear Hazy A Hazy A Clear   pH, UA 7.0 5.0 5.0 5.0   Specific [place], UA 1.015 1.010 1.010 1.015   Protein, UA Negative Negative 1+ A 1+ A   Glucose, UA Negative Negative Negative Negative   Ketones, UA Negative Negative Negative Negative   Urobilinogen, UA Negative  --  Negative Negative   Bilirubin (UA) Negative Negative Negative Negative   Occult Blood UA Negative Negative Trace A Negative    Nitrite, UA Negative Negative Negative Negative   RBC, UA  --  2 4 2   WBC, UA  --  12 H 4 12 H   Bacteria  --  Occasional Occasional Rare   Hyaline Casts, UA  --  4 A 5 A 5 A       Recent Labs   Lab 01/16/23  0021   Sample VENOUS       Microbiology Results (last 7 days)       Procedure Component Value Units Date/Time    Urine culture [[id number]] Collected: 01/16/23 0401    Order Status: No result Specimen: Urine Updated: 01/16/23 0433    Blood culture [[id number]] Collected: 01/16/23 0407    Order Status: Sent Specimen: Blood from Peripheral, Antecubital, Left Updated: 01/16/23 0418    Blood culture [[id number]] Collected: 01/16/23 0235    Order Status: Sent Specimen: Blood from Peripheral, Upper Arm, Left Updated: 01/16/23 0243            ASSESSMENT/PLAN:     ISSA, UA looks blind ? hemodynamic 2/2 GIB, hypovolemia 2/2 N/V 2/2 choledocholithiasis, ACEi  Hyperkalemia  Bradycardia 2/2 hyperkalemia?  Acidosis  Hyponatremia  CKD stage 3, baseline sCr around 1.4  HFpEF  DM  No NSAIDs, ACEI/ARB, IV contrast or other nephrotoxins.  Keep MAP > 60, SBP > 100.  Dose meds for GFR < 30 ml/min.  Renal diet - low K, low phos and Lokelma when able to take PO.  If repeat labs are still abnormal, consider insulin+d50, etc.  Change from NS to bicarb gtt.  Place daniel to monitor UO.  Labs q4h until K is controlled.  Control DM.  Hopefully will not need dialysis.    Anemia of CKD  Hgb and HCT are acceptable. Monitor for now.  Continue oral iron.    MBD / Secondary HPT  Ca, Phos, PTH and vitamin D levels are acceptable.   Continue vitamin D.    HTN  BP seem controlled.   Tolerate asymptomatic HTN up to -160.  Continue home meds, hold diureics and ACEi.    Thank you for allowing us to participate in the care of your patient!   We will follow the patient and provide recommendations as needed.    Patient care time was spent personally by me on the following activities:     Obtaining a history.  Examination of patient.  Providing  medical care at the patients bedside.  Developing a treatment plan with patient or surrogate and bedside caregivers.  Ordering and reviewing laboratory studies, radiographic studies, pulse oximetry.  Ordering and performing treatments and interventions.  Evaluation of patient's response to treatment.  Discussions with consultants while on the unit and immediately available to the patient.  Re-evaluation of the patient's condition.  Documentation in the medical record.     Jeremías Lopez MD    [facility]  [address] [place] [id number]    [phone] - tel  [phone] - fax    1/16/2023

## 2023-01-16 NOTE — PLAN OF CARE
Pt AAOx4. Montes cath in place. Frequent bowel movements from Lokelma. Barrier cream placed for excoriation. Trialysis cath placed for HD. Pt's daughter arrived at bedside.         Problem: Infection  Goal: Absence of Infection Signs and Symptoms  Outcome: Ongoing, Progressing     Problem: Adult Inpatient Plan of Care  Goal: Plan of Care Review  Outcome: Ongoing, Progressing  Goal: Patient-Specific Goal (Individualized)  Outcome: Ongoing, Progressing  Goal: Absence of Hospital-Acquired Illness or Injury  Outcome: Ongoing, Progressing  Goal: Optimal Comfort and Wellbeing  Outcome: Ongoing, Progressing

## 2023-01-16 NOTE — ANESTHESIA PREPROCEDURE EVALUATION
01/16/2023  Marisol Kerr is a 77 y.o., female.      Patient Active Problem List   Diagnosis    Hypercholesterolemia    Essential hypertension, benign    Depressive disorder, not elsewhere classified    Coronary atherosclerosis    Persistent disorder of initiating or maintaining sleep    Edema    DM type 2, controlled, with complication    Empyema lung    Hypertension    Coronary artery disease, multivessel with history of previous PCI    Gastroesophageal reflux disease without esophagitis    Diabetic peripheral neuropathy    Diabetic peripheral vascular disease    CKD (chronic kidney disease), stage III    Osteoporosis, post-menopausal    Class 2 obesity in adult    Fall    Lung nodule    History of colon cancer    Pure hypercholesterolemia    Adrenal adenoma    Primary osteoarthritis of both hips    COPD/emphysema    Chronic hypoxemic respiratory failure    Chronic diastolic congestive heart failure    Chest pain    Leukocytosis    ISSA (acute kidney injury)    Decubitus ulcer of sacral region, stage 1    Bilateral nonobstructing nephrolithiasis    Hypophosphatemia    Primary insomnia    Vitamin D deficiency    Chronic respiratory failure with hypoxia    Venous stasis    Hard of hearing    NSTEMI (non-ST elevated myocardial infarction)    Rectus sheath hematoma    Normocytic anemia    Esophageal dysmotility    Gout    Impaired functional mobility and endurance    Acute on chronic diastolic heart failure    Multiple chronic diseases    Varicose veins of unspecified lower extremity with ulcer of unspecified site (CODE)    Unstable angina    Elevated liver enzymes    COPD exacerbation       Past Surgical History:   Procedure Laterality Date    ANGIOGRAM, CORONARY, WITH LEFT HEART CATHETERIZATION N/A 2/10/2022    Procedure: Angiogram, Coronary, with Left Heart  Cath;  Surgeon: Cristian Benjamin MD;  Location: [facility] CATH/EP LAB;  Service: Cardiology;  Laterality: N/A;    CARDIAC CATHETERIZATION      CHOLECYSTECTOMY      COLECTOMY      CORONARY ANGIOPLASTY      CORONARY STENT PLACEMENT      EXTERNAL EAR SURGERY      EYE SURGERY      FLEXIBLE SIGMOIDOSCOPY N/A 9/19/2019    Procedure: SIGMOIDOSCOPY, FLEXIBLE;  Surgeon: Biju Kramer III, MD;  Location: [facility] ENDO;  Service: Endoscopy;  Laterality: N/A;    HYSTERECTOMY      partial        Tobacco Use:  The patient  reports that she quit smoking about 16 years ago. Her smoking use included cigarettes. She started smoking about 58 years ago. She has a 150.00 pack-year smoking history. She has never used smokeless tobacco.     Results for orders placed or performed during the hospital encounter of 01/15/23   EKG 12-lead    Collection Time: 01/15/23 11:52 PM    Narrative    Test Reason : R07.9,    Vent. Rate : 050 BPM     Atrial Rate : 050 BPM     P-R Int : 170 ms          QRS Dur : 102 ms      QT Int : 460 ms       P-R-T Axes : 069 076 093 degrees     QTc Int : 419 ms    Sinus bradycardia  Otherwise normal ECG  When compared with ECG of 09-JAN-2023 13:13,  ST no longer depressed in Inferior leads  T wave inversion no longer evident in Inferior leads  Nonspecific T wave abnormality, improved in Anterior-lateral leads    Referred By: [email or web address]   SELF           Confirmed By:         Imaging Results          US Abdomen Limited (Final result)  Result time 01/16/23 02:42:51    Final result by Erica Ornelas MD (01/16/23 02:42:51)                 Narrative:    EXAM:  US Abdomen Limited, Right Upper Quadrant    CLINICAL HISTORY:  The patient is 77 years old and is Female; abdominal pain    TECHNIQUE:  Real-time ultrasound of the right upper quadrant with image documentation.    COMPARISON:  CT of the abdomen and pelvis performed the same day.    FINDINGS:  LIVER: Slightly heterogeneous with mild intrahepatic biliary  dilatation. Normal hepatopedal flow.  GALLBLADDER:  The gallbladder surgically absent.  COMMON BILE DUCT:  The common bile duct is dilated measuring up to 0.7 cm. Echogenic stone is noted within the distal common bile duct.  PANCREAS:  Pancreas is not well-visualized secondary to bowel gas.  RIGHT KIDNEY: Unremarkable.  No stones.  No solid mass.  No hydronephrosis.    IMPRESSION:  Evidence of choledocholithiasis.    Electronically signed by:  Erica Ornelas MD  1/16/2023 2:42 AM CST Workstation: [id number]                             CT Abdomen Pelvis  Without Contrast (Final result)  Result time 01/16/23 01:45:26    Final result by Hay Farrell MD (01/16/23 01:45:26)                 Narrative:    EXAM DESCRIPTION:  CT ABDOMEN PELVIS WITHOUT CONTRAST    CLINICAL HISTORY:  77 years  Female  abdominal pain    TECHNIQUE:  Non contrast CT of the abdomen and pelvis with coronal and sagittal reformats. All CT scans at this facility use dose modulation, iterative reconstruction, and/or weight based dosing when appropriate to reduce radiation dose to as low as reasonably achievable.    COMPARISON: 2/14/2022    FINDINGS:  Lower chest: Lung bases are clear.    Abdomen/Pelvis:  Limitation: Study is degraded by motion artifact.  Liver: Pneumobilia in the left hepatic lobe is now present.  Gallbladder: Status post cholecystectomy.  Pneumobilia in the common bile duct is now present with stranding of the common bile duct. Punctate 2 mm density within the common bile duct seen in axial image 62 series 4 and coronal image 54 series 6.  Pancreas: Within normal limits.  Spleen: Within normal limits.  Kidney: No stone or hydronephrosis.  Adrenal glands: Unchanged 3.7 cm left adrenal myolipoma and 0.9 cm right adrenal nodule.  Vascular structures: Atherosclerotic calcification of the aorta and its major branches..    Bowel: No bowel distention.  Appendix: Normal.  Peritoneum: No free fluid or free air.    Lymph Nodes: No  lymphadenopathy.  Reproductive: Unremarkable.  Urinary bladder: Unremarkable.    Osseous structures: Multilevel degenerative changes. Severe bilateral hip degenerative changes.  Soft tissues: Unremarkable.    IMPRESSION:  1. Interval development of intrahepatic and extrahepatic pneumobilia with stranding along the common bile duct. Recommend CT abdomen and pelvis with contrast for further evaluation.  2. Punctate 2 mm density in the common bile duct as described above suspicious for ductal stone. This can be further evaluated with MRCP.  3. Additional chronic findings as above.      Electronically signed by:  Hay Farrell MD  1/16/2023 1:45 AM CST Workstation: [id number]                             X-Ray Chest AP Portable (Final result)  Result time 01/16/23 06:03:17    Final result by Zohaib Lucio MD (01/16/23 06:03:17)                 Narrative:    Chest single view    CLINICAL DATA: Chest pain    FINDINGS: Comparison to January 9. When allowing for oblique positioning, heart size is normal. The mediastinum is unremarkable. There is mild scarring or atelectasis at the right lung base. No effusions are identified. No acute osseous abnormality is demonstrated.    Right subclavian central venous catheter tip is at the superior vena cava.    Impression:  1. Mild scarring or atelectasis at the right lung base.  2. No other significant findings.    Electronically signed by:  Zohaib Lucio MD  1/16/2023 6:03 AM CST Workstation: [id number]                               Lab Results   Component Value Date    WBC 39.12 (HH) 01/16/2023    HGB 11.7 (L) 01/16/2023    HCT 38.5 01/16/2023    MCV 97 01/16/2023     01/16/2023     BMP  Lab Results   Component Value Date     (L) 01/16/2023    K 6.6 (HH) 01/16/2023     01/16/2023    CO2 19 (L) 01/16/2023     (H) 01/16/2023    CREATININE 5.3 (H) 01/16/2023    CALCIUM 7.4 (L) 01/16/2023    ANIONGAP 15 01/16/2023     01/16/2023      (H) 01/10/2023     (H) 01/09/2023       Results for orders placed during the hospital encounter of 04/08/22    Echo    Interpretation Summary  · Limited study for wall morgan. several off axis views.  · The estimated ejection fraction is 60%.  · The left ventricle is normal in size with normal systolic function.  · The following segments are hypokinetic: basal inferoseptal and basal inferior. All other segments are normal.  · Normal right ventricular size with normal right ventricular systolic function.              Pre-op Assessment    I have reviewed the Patient Summary Reports.     I have reviewed the Nursing Notes. I have reviewed the NPO Status.   I have reviewed the Medications.     Review of Systems  Anesthesia Hx:  No problems with previous Anesthesia  Denies Family Hx of Anesthesia complications.   Denies Personal Hx of Anesthesia complications.   Social:  Former Smoker    Hematology/Oncology:         -- Anemia:   Cardiovascular:   Hypertension, well controlled Past MI (hx NSTEMI 2022) CAD (multivessel disease on cath, pt was to have PCI but rescheduled, not candidate for CABG)  CABG/stent (hx of prior PCI )  Angina (unstable angina, no current CP) CHF (EF preserved 60%) hyperlipidemia    Pulmonary:   COPD, severe Asthma moderate O2 dependent 2-3 nasal cannula   Renal/:   Chronic Renal Disease (acute on chronic renal failure. K correction ongoing), CKD, ARF renal calculi    Hepatic/GI:   GERD, well controlled    Musculoskeletal:   Arthritis (Gout, osteoarthritis)  Uses canes/walker for mobility   Neurological:   Neuromuscular Disease, (diabetic peripheral neuropathy)    Endocrine:   Diabetes, type 2    Psych:   Psychiatric History depression          Physical Exam  General: Well nourished, Cooperative, Alert and Oriented    Airway:  Mallampati: III / II  Mouth Opening: Normal  TM Distance: Normal  Tongue: Normal  Neck ROM: Normal ROM    Chest/Lungs:  Clear to auscultation    Heart:  Rate:  Normal  Rhythm: Regular Rhythm  Sounds: Normal    Abdomen:  Normal, Soft, Nontender        Anesthesia Plan  Type of Anesthesia, risks & benefits discussed:    Anesthesia Type: Gen ETT  Intra-op Monitoring Plan: Standard ASA Monitors  Post Op Pain Control Plan: multimodal analgesia and IV/PO Opioids PRN  Induction:  IV  Airway Plan: Video, Post-Induction  Informed Consent: Informed consent signed with the Patient and all parties understand the risks and agree with anesthesia plan.  All questions answered.   ASA Score: 4 Emergent  Anesthesia Plan Notes: **CHART PREOP **  GETA  Avoid succinylcholine - elevated K+  sugammadex for reversal  Zofran Pepcid    Ready For Surgery From Anesthesia Perspective.     .

## 2023-01-16 NOTE — ANESTHESIA PROCEDURE NOTES
Central Line    Diagnosis: Inadequate IV access  Patient location during procedure: ICU  Timeout: 1/16/2023 11:42 AM  Procedure end time: 1/16/2023 12:04 PM    Staffing  Authorizing Provider: Dereje Claire MD  Performing Provider: Dereje Claire MD    Staffing  Performed: anesthesiologist   Anesthesiologist: Dereje Claire MD  Anesthesiologist was present at the time of the procedure.  Preanesthetic Checklist  Completed: patient identified, risks and benefits discussed, pre-op evaluation, timeout performed and anesthesia consent given  Indication   Indication: vascular access, hemodialysis     Anesthesia   local infiltration    Central Line   Skin Prep: skin prepped with ChloraPrep, skin prep agent completely dried prior to procedure  Sterile Barriers Followed: Yes    All five maximal barriers used- gloves, gown, cap, mask, and large sterile sheet    hand hygiene performed prior to central venous catheter insertion  Location: right internal jugular.   Catheter type: dialysis  Catheter Size: 9 Fr  Inserted Catheter Length: 14 cm  Ultrasound: vascular probe with ultrasound   Vessel Caliber: medium, patent  Vascular Doppler:  not done, compressibility normal  Needle advanced into vessel with real time Ultrasound guidance.  Guidewire confirmed in vessel.  sterile gel and probe cover used in ultrasound-guided central venous catheter insertion  Manometry: none  Insertion Attempts: 1   Securement:line sutured, chlorhexidine patch, sterile dressing applied and blood return through all ports    Post-Procedure   X-Ray: no pneumothorax on x-ray, placement verified by x-ray, tip termination and successful placement  Tip termination comments: SVC   Adverse Events:none      Guidewire Guidewire removed intact. Guidewire removed intact, verified with nurse.  Additional Notes  Patient tolerated the procedure well.  Chest x-ray shows line in good position with no evidence of complications.  Okay to use right internal  jugular Trialysis catheter.  Please re-consult if needed.

## 2023-01-16 NOTE — ANESTHESIA POSTPROCEDURE EVALUATION
Anesthesia Post Evaluation    Patient: Marisol Kerr    Procedure(s) Performed: Procedure(s) (LRB):  ERCP (ENDOSCOPIC RETROGRADE CHOLANGIOPANCREATOGRAPHY) (N/A)    Final Anesthesia Type: general      Patient location during evaluation: PACU  Patient participation: Yes- Able to Participate  Level of consciousness: awake and alert and oriented  Post-procedure vital signs: reviewed and stable  Pain management: adequate  Airway patency: patent    PONV status at discharge: No PONV  Anesthetic complications: no      Cardiovascular status: blood pressure returned to baseline and hemodynamically stable  Respiratory status: unassisted, spontaneous ventilation and nasal cannula  Hydration status: euvolemic  Follow-up not needed.          Vitals Value Taken Time   /54 01/16/23 1230   Temp 36.6 01/16/23 1239   Pulse 65 01/16/23 1237   Resp 27 01/16/23 1237   SpO2 100 % 01/16/23 1237   Vitals shown include unvalidated device data.      No case tracking events are documented in the log.      Pain/Bandar Score: Bandar Score: 9 (1/16/2023 11:25 AM)

## 2023-01-16 NOTE — TRANSFER OF CARE
Anesthesia Transfer of Care Note    Patient: Marisol Kerr    Procedure(s) Performed: Procedure(s) (LRB):  ERCP (ENDOSCOPIC RETROGRADE CHOLANGIOPANCREATOGRAPHY) (N/A)    Patient location: PACU    Anesthesia Type: general    Transport from OR: Transported from OR on 2-3 L/min O2 by NC with adequate spontaneous ventilation    Post pain: adequate analgesia    Post assessment: no apparent anesthetic complications and tolerated procedure well    Post vital signs: stable    Level of consciousness: awake    Nausea/Vomiting: no nausea/vomiting    Complications: none    Transfer of care protocol was followed      Last vitals:   Visit Vitals  BP (!) 107/50 (BP Location: Right arm, Patient Position: Lying)   Pulse 67   Temp 36.7 °C (98.1 °F) (Oral)   Resp (!) 22   Wt 84.4 kg (186 lb)   SpO2 98%   Breastfeeding No   BMI 32.95 kg/m²

## 2023-01-16 NOTE — PROVATION PATIENT INSTRUCTIONS
Discharge Summary/Instructions after an Endoscopic Procedure  Patient Name: Marisol Kerr  Patient MRN: [id number]  Patient YOB: 1945 Monday, January 16, 2023  Biju Kramer III, MD  RESTRICTIONS:  During your procedure today, you received medications for sedation.  These   medications may affect your judgment, balance and coordination.  Therefore,   for 24 hours, you have the following restrictions:   - DO NOT drive a car, operate machinery, make legal/financial decisions,   sign important papers or drink alcohol.    ACTIVITY:  Today: no heavy lifting, straining or running due to procedural   sedation/anesthesia.  The following day: return to full activity including work.  DIET:  Eat and drink normally unless instructed otherwise.     TREATMENT FOR COMMON SIDE EFFECTS:  - Mild abdominal pain, nausea, belching, bloating or excessive gas:  rest,   eat lightly and use a heating pad.  - Sore Throat: treat with throat lozenges and/or gargle with warm salt   water.  - Because air was used during the procedure, expelling large amounts of air   from your rectum or belching is normal.  - If a bowel prep was taken, you may not have a bowel movement for 1-3 days.    This is normal.  SYMPTOMS TO WATCH FOR AND REPORT TO YOUR PHYSICIAN:  1. Abdominal pain or bloating, other than gas cramps.  2. Chest pain.  3. Back pain.  4. Signs of infection such as: chills or fever occurring within 24 hours   after the procedure.  5. Rectal bleeding, which would show as bright red, maroon, or black stools.   (A tablespoon of blood from the rectum is not serious, especially if   hemorrhoids are present.)  6. Vomiting.  7. Weakness or dizziness.  GO DIRECTLY TO THE NEAREST EMERGENCY ROOM IF YOU HAVE ANY OF THE FOLLOWING:      Difficulty breathing              Chills and/or fever over 101 F   Persistent vomiting and/or vomiting blood   Severe abdominal pain   Severe chest pain   Black, tarry stools   Bleeding- more than one  tablespoon   Any other symptom or condition that you feel may need urgent attention  Your doctor recommends these additional instructions:  If any biopsies were taken, your doctors clinic will contact you in 1 to 2   weeks with any results.  - Continue present medications.   - Continue IV abx and d/c home on CIpro x 1 week; consult renal as discussed   w/ IM due to renal failure / hyperkalemia  - Return patient to hospital [facility] for ongoing care.  For questions, problems or results please call your physician - Biju Kramer III, MD at Work:  [phone].  [facility], EMERGENCY ROOM PHONE NUMBER: [phone]  IF A COMPLICATION OR EMERGENCY SITUATION ARISES AND YOU ARE UNABLE TO REACH   YOUR PHYSICIAN - GO DIRECTLY TO THE EMERGENCY ROOM.  Biju Kramer III, MD  1/16/2023 11:31:22 AM  This report has been verified and signed electronically.  Dear patient,  As a result of recent federal legislation (The Federal Cures Act), you may   receive lab or pathology results from your procedure in your [facility]   account before your physician is able to contact you. Your physician or   their representative will relay the results to you with their   recommendations at their soonest availability.  Thank you,  [employer]ATION

## 2023-01-16 NOTE — PROGRESS NOTES
Pt brought back to the floor from ENDO procedure. Bedside report given. Pt now being sent to dialysis.     Trialysis cath in place. Ca gluconate infusing. Na Bicarb restarted. Montes cath in place.

## 2023-01-16 NOTE — ANESTHESIA PROCEDURE NOTES
Intubation    Date/Time: 1/16/2023 10:28 AM  Performed by: Abhinav Lopez CRNA  Authorized by: Dereje Claire MD     Intubation:     Induction:  Intravenous    Intubated:  Postinduction    Mask Ventilation:  Easy mask    Attempts:  1    Attempted By:  CRNA    Method of Intubation:  Video laryngoscopy    Blade:  Todd 3    Laryngeal View Grade: Grade I - full view of cords      Difficult Airway Encountered?: No      Complications:  None    Airway Device:  Oral endotracheal tube    Airway Device Size:  7.0    Style/Cuff Inflation:  Cuffed    Inflation Amount (mL):  7    Tube secured:  21    Secured at:  The lips    Placement Verified By:  Capnometry    Complicating Factors:  None    Findings Post-Intubation:  BS equal bilateral and atraumatic/condition of teeth unchanged

## 2023-01-16 NOTE — PROGRESS NOTES
Net fluid removed 0.5 liters       01/16/23 1740   Handoff Report   Received From OFELIA Mike   Given To OFELIA Walden   Vital Signs   Temp 98 °F (36.7 °C)   Pulse 75   Resp 16   SpO2 98 %   BP (!) 140/60   Assessments (Pre/Post)   Transport Modality bed   Level of Consciousness (AVPU) alert   Dialyzer Clearance mildly streaked   Pain   Preferred Pain Scale number (Numeric Rating Pain Scale)   Pain Rating (0-10): Rest 0   Pre-Hemodialysis Assessment   Treatment Status Completed        Hemodialysis Catheter 01/16/23 1243   Placement Date/Time: 01/16/23 (c) 1243   Hand Hygiene: Performed  Barrier Precautions: Performed  Skin Antisepsis: ChloraPrep  Catheter Secured At (cm): 14  Insertion attempts (enter comment if more than 2 attempts): 1   Line Necessity Review CRRT/HD   Verification by X-ray Yes   Site Assessment No drainage;No redness;No swelling;No warmth   Line Securement Device Secured with sutures   Dressing Type Biopatch in place;Central line dressing   Dressing Status Dry;Clean;Intact   Dressing Intervention Integrity maintained   Date on Dressing 01/16/23   Dressing Due to be Changed 01/23/23   Venous Patency/Care blood return present;heparin locked;flushed w/o difficulty   Arterial Patency/Care flushed w/o difficulty;heparin locked;blood return present   Waveform Not being transduced   Post-Hemodialysis Assessment   Rinseback Volume (mL) 250 mL   Blood Volume Processed (Liters) 45.3 L   Dialyzer Clearance Lightly streaked   Duration of Treatment 180 minutes   Additional Fluid Intake (mL) 500 mL   Total UF (mL) 1000 mL   Net Fluid Removal 500   Patient Response to Treatment tolerated well   Post-Hemodialysis Comments tx completed, reinfused, no blood loss noted

## 2023-01-16 NOTE — PLAN OF CARE
01/16/23 0816   Patient Assessment/Suction   Level of Consciousness (AVPU) alert   Respiratory Effort Unlabored;Normal   Expansion/Accessory Muscles/Retractions no use of accessory muscles   All Lung Fields Breath Sounds Anterior:;Lateral:   WILMER Breath Sounds coarse   RLL Breath Sounds diminished   Rhythm/Pattern, Respiratory unlabored   Cough Frequency infrequent   Cough Type fair;loose;nonproductive   PRE-TX-O2   Device (Oxygen Therapy) nasal cannula   $ Is the patient on Low Flow Oxygen? Yes   Flow (L/min) 2   SpO2 98 %   Pulse Oximetry Type Continuous   $ Pulse Oximetry - Multiple Charge Pulse Oximetry - Multiple   Pulse 67   Resp (!) 22   Aerosol Therapy   $ Aerosol Therapy Charges Aerosol Treatment   Daily Review of Necessity (SVN) completed   Respiratory Treatment Status (SVN) given   Treatment Route (SVN) mask;oxygen   Patient Position (SVN) semi-Solorzano's   Post Treatment Assessment (SVN) increased aeration   Signs of Intolerance (SVN) none   Breath Sounds Post-Respiratory Treatment   Throughout All Fields Post-Treatment All Fields   Throughout All Fields Post-Treatment aeration increased   Post-treatment Heart Rate (beats/min) 68   Post-treatment Resp Rate (breaths/min) 16   Education   $ Education Bronchodilator;15 min   Respiratory Evaluation   $ Care Plan Tech Time 15 min   $ Eval/Re-eval Charges Evaluation   Evaluation For New Orders

## 2023-01-16 NOTE — ED PROVIDER NOTES
Encounter Date: 1/15/2023       History     Chief Complaint   Patient presents with    Vomiting     Ems reports pt was sitting on toilet stating she felt like she had diarrhea, pt was lethargic, vomiting and was bradycardic at 40. Gcs 15    Bradycardia    Abdominal Pain     lower     77-year-old female with history of coronary artery disease, type 2 diabetes, hypertension, hyperlipidemia, colitis, colon cancer.  Patient presents emergency department with complaint of lower abdominal pain and persistent vomiting since earlier tonight.  Patient with coffee-ground emesis.  Patient denies fever, no diarrhea, no other constitutional symptoms.  Per EMS patient found with profound weakness and had difficulty attempting to stand from seated position.  Patient had multiple episodes of vomiting EN route and upon arrival to emergency department.    Review of patient's allergies indicates:   Allergen Reactions    Iodinated contrast media     Strawberries [strawberry] Hives and Itching     Past Medical History:   Diagnosis Date    CAD (coronary artery disease)     Cancer     colon    CHF (congestive heart failure)     Colitis     Colon cancer     COPD (chronic obstructive pulmonary disease)     Decreased hearing     Diabetes mellitus     Diabetes mellitus, type 2     Hypercholesterolemia     Hypertension     Hypoxemia     Insomnia     Obesity     Osteoarthritis      Past Surgical History:   Procedure Laterality Date    ANGIOGRAM, CORONARY, WITH LEFT HEART CATHETERIZATION N/A 2/10/2022    Procedure: Angiogram, Coronary, with Left Heart Cath;  Surgeon: Cristian Benjamin MD;  Location: [facility] CATH/EP LAB;  Service: Cardiology;  Laterality: N/A;    CARDIAC CATHETERIZATION      CHOLECYSTECTOMY      COLECTOMY      CORONARY ANGIOPLASTY      CORONARY STENT PLACEMENT      EXTERNAL EAR SURGERY      EYE SURGERY      FLEXIBLE SIGMOIDOSCOPY N/A 9/19/2019    Procedure: SIGMOIDOSCOPY, FLEXIBLE;  Surgeon: Biju Kramer III, MD;   Location: [facility];  Service: Endoscopy;  Laterality: N/A;    HYSTERECTOMY      partial     Family History   Problem Relation Age of Onset    Febrile seizures Mother     Heart failure Father      Social History     Tobacco Use    Smoking status: Former     Packs/day: 3.00     Years: 50.00     Pack years: 150.00     Types: Cigarettes     Start date: 3/30/1964     Quit date: 2006     Years since quittin.8    Smokeless tobacco: Never    Tobacco comments:     ex smoker 15 years   Substance Use Topics    Alcohol use: Yes    Drug use: No     Review of Systems   Constitutional:  Negative for fever.   HENT:  Negative for sore throat.    Respiratory:  Negative for shortness of breath.    Cardiovascular:  Negative for chest pain.   Gastrointestinal:  Positive for abdominal pain, nausea and vomiting.   Genitourinary:  Negative for dysuria.   Musculoskeletal:  Negative for back pain.   Skin:  Negative for rash.   Neurological:  Negative for weakness.   Hematological:  Does not bruise/bleed easily.     Physical Exam     Initial Vitals   BP Pulse Resp Temp SpO2   23 0002 23 0002 23 0002 23 0006 23 0015   (!) 129/58 (!) 50 16 97.6 °F (36.4 °C) (!) 92 %      MAP       --                Physical Exam    Nursing note and vitals reviewed.  Constitutional: She appears well-developed and well-nourished.   Patient actively vomiting in emergency department.  Upon arrival   HENT:   Head: Normocephalic and atraumatic.   Nose: Nose normal.   Mouth/Throat: Oropharynx is clear and moist.   Eyes: Conjunctivae and EOM are normal. Pupils are equal, round, and reactive to light.   Neck: Neck supple. No thyromegaly present. No tracheal deviation present.   Normal range of motion.  Cardiovascular:  Normal rate, regular rhythm, normal heart sounds and intact distal pulses.     Exam reveals no gallop and no friction rub.       No murmur heard.  Pulmonary/Chest: Breath sounds normal. No stridor. No respiratory  distress.   Abdominal: Abdomen is soft. She exhibits no mass. There is abdominal tenderness (Noted to left lower quadrant.  No rebound, no guarding.  Hypoactive bowel sounds). There is no rebound and no guarding.   Musculoskeletal:         General: No edema. Normal range of motion.      Cervical back: Normal range of motion and neck supple.     Lymphadenopathy:     She has no cervical adenopathy.   Neurological: She is alert and oriented to person, place, and time. She has normal strength and normal reflexes. GCS score is 15. GCS eye subscore is 4. GCS verbal subscore is 5. GCS motor subscore is 6.   Skin: Skin is warm and dry. Capillary refill takes less than 2 seconds.   Psychiatric: She has a normal mood and affect.       ED Course   Procedures  Labs Reviewed   CBC W/ AUTO DIFFERENTIAL - Abnormal; Notable for the following components:       Result Value    WBC 34.16 (*)     MCHC 30.7 (*)     Gran % 88.0 (*)     Lymph % 8.0 (*)     Mono % 0.0 (*)     All other components within normal limits   COMPREHENSIVE METABOLIC PANEL - Abnormal; Notable for the following components:    Sodium 134 (*)     Potassium 6.6 (*)     CO2 19 (*)      (*)     Creatinine 5.3 (*)     Calcium 7.4 (*)     Albumin 3.1 (*)     eGFR 7.9 (*)     All other components within normal limits    Narrative:        Potassium critical result(s) called and verbal readback obtained   from Iram Murray RN ER  by MS1 01/16/2023 02:02   B-TYPE NATRIURETIC PEPTIDE - Abnormal; Notable for the following components:     (*)     All other components within normal limits   LIPASE - Abnormal; Notable for the following components:    Lipase 198 (*)     All other components within normal limits   ISTAT CREATININE - Abnormal; Notable for the following components:    POC Creatinine 6.2 (*)     All other components within normal limits   POCT GLUCOSE - Abnormal; Notable for the following components:    POC Glucose 117 (*)     All other components within  normal limits   CULTURE, BLOOD   CULTURE, BLOOD   TROPONIN I HIGH SENSITIVITY   SARS-COV-2 RNA AMPLIFICATION, QUAL   MAGNESIUM   TSH   LACTIC ACID, PLASMA   URINALYSIS, REFLEX TO URINE CULTURE   PROCALCITONIN   TROPONIN I HIGH SENSITIVITY   POCT GLUCOSE   POCT GLUCOSE   POCT GLUCOSE MONITORING CONTINUOUS   POCT CREATININE          Imaging Results              US Abdomen Limited (Final result)  Result time 01/16/23 02:42:51      Final result by Erica Ornelas MD (01/16/23 02:42:51)                   Narrative:    EXAM:  US Abdomen Limited, Right Upper Quadrant    CLINICAL HISTORY:  The patient is 77 years old and is Female; abdominal pain    TECHNIQUE:  Real-time ultrasound of the right upper quadrant with image documentation.    COMPARISON:  CT of the abdomen and pelvis performed the same day.    FINDINGS:  LIVER: Slightly heterogeneous with mild intrahepatic biliary dilatation. Normal hepatopedal flow.  GALLBLADDER:  The gallbladder surgically absent.  COMMON BILE DUCT:  The common bile duct is dilated measuring up to 0.7 cm. Echogenic stone is noted within the distal common bile duct.  PANCREAS:  Pancreas is not well-visualized secondary to bowel gas.  RIGHT KIDNEY: Unremarkable.  No stones.  No solid mass.  No hydronephrosis.    IMPRESSION:  Evidence of choledocholithiasis.    Electronically signed by:  Erica Ornelas MD  1/16/2023 2:42 AM CST Workstation: [id number]                                     CT Abdomen Pelvis  Without Contrast (Final result)  Result time 01/16/23 01:45:26      Final result by Hay Farrell MD (01/16/23 01:45:26)                   Narrative:    EXAM DESCRIPTION:  CT ABDOMEN PELVIS WITHOUT CONTRAST    CLINICAL HISTORY:  77 years  Female  abdominal pain    TECHNIQUE:  Non contrast CT of the abdomen and pelvis with coronal and sagittal reformats. All CT scans at this facility use dose modulation, iterative reconstruction, and/or weight based dosing when appropriate to reduce  radiation dose to as low as reasonably achievable.    COMPARISON: 2/14/2022    FINDINGS:  Lower chest: Lung bases are clear.    Abdomen/Pelvis:  Limitation: Study is degraded by motion artifact.  Liver: Pneumobilia in the left hepatic lobe is now present.  Gallbladder: Status post cholecystectomy.  Pneumobilia in the common bile duct is now present with stranding of the common bile duct. Punctate 2 mm density within the common bile duct seen in axial image 62 series 4 and coronal image 54 series 6.  Pancreas: Within normal limits.  Spleen: Within normal limits.  Kidney: No stone or hydronephrosis.  Adrenal glands: Unchanged 3.7 cm left adrenal myolipoma and 0.9 cm right adrenal nodule.  Vascular structures: Atherosclerotic calcification of the aorta and its major branches..    Bowel: No bowel distention.  Appendix: Normal.  Peritoneum: No free fluid or free air.    Lymph Nodes: No lymphadenopathy.  Reproductive: Unremarkable.  Urinary bladder: Unremarkable.    Osseous structures: Multilevel degenerative changes. Severe bilateral hip degenerative changes.  Soft tissues: Unremarkable.    IMPRESSION:  1. Interval development of intrahepatic and extrahepatic pneumobilia with stranding along the common bile duct. Recommend CT abdomen and pelvis with contrast for further evaluation.  2. Punctate 2 mm density in the common bile duct as described above suspicious for ductal stone. This can be further evaluated with MRCP.  3. Additional chronic findings as above.      Electronically signed by:  Hay Farrell MD  1/16/2023 1:45 AM CST Workstation: [id number]                                     X-Ray Chest AP Portable (In process)                      Medications   atropine 0.1 mg/mL injection (has no administration in time range)   piperacillin-tazobactam 3.375 g in dextrose 5 % 50 mL IVPB (ready to mix system) (has no administration in time range)   albuterol nebulizer solution 10 mg (has no administration in time  range)   dextrose 10% bolus 125 mL 125 mL (has no administration in time range)   dextrose 10% bolus 250 mL 250 mL (250 mLs Intravenous New Bag 1/16/23 0248)   sodium zirconium cyclosilicate packet 10 g (has no administration in time range)   0.9%  NaCl infusion (has no administration in time range)   calcium gluconate 1 g in NS IVPB (premixed) (1 g Intravenous New Bag 1/16/23 0256)   ondansetron injection 8 mg (8 mg Intravenous Given 1/16/23 0010)   pantoprazole injection 80 mg (80 mg Intravenous Given 1/16/23 0031)   insulin regular injection 5 Units 0.05 mL (5 Units Intravenous Given 1/16/23 0248)   sodium bicarbonate 8.4 % (1 mEq/mL) injection 25 mEq (25 mEq Intravenous Given 1/16/23 0247)     Medical Decision Making:   Initial Assessment:   77-year-old female with history of coronary artery disease, type 2 diabetes, hypertension, hyperlipidemia, colitis, colon cancer.  Patient presents emergency department with complaint of lower abdominal pain and persistent vomiting since earlier tonight.  Patient with coffee-ground emesis.  Patient denies fever, no diarrhea, no other constitutional symptoms.    Differential Diagnosis:   Bowel obstruction, upper GI bleed, gastritis, viral syndrome, vasovagal episode, electrolyte abnormality  Clinical Tests:   Lab Tests: Ordered and Reviewed  Radiological Study: Ordered and Reviewed  Medical Tests: Ordered and Reviewed          Attending Attestation:         Attending Critical Care:   Critical Care Times:   Direct Patient Care (initial evaluation, reassessments, and time considering the case)................................................................30 minutes.   Additional History from reviewing old medical records or taking additional history from the family, EMS, PCP, etc.......................10 minutes.   Ordering, Reviewing, and Interpreting Diagnostic  Studies...............................................................................................................10 minutes.   Documentation..................................................................................................................................................................................10 minutes.   Consultation with other Physicians. .................................................................................................................................................10 minutes.   Consultation with the patient's family directly relating to the patient's condition, care, and DNR status (when patient unable)......5 minutes.   ==============================================================  Total Critical Care Time - exclusive of procedural time: 75 minutes.  ==============================================================  Critical care was necessary to treat or prevent imminent or life-threatening deterioration of the following conditions: renal failure.   Critical care was time spent personally by me on the following activities: obtaining history from patient or relative, examination of patient, review of x-rays / CT sent with the patient, review of old charts, ordering lab, x-rays, and/or EKG, ordering and performing treatments and interventions, evaluation of patient's response to treatment, discussion with consultants and re-evaluation of patient's conition.   Critical Care Condition: potentially life-threatening         ED Course as of 01/16/23 0302   Mon Jan 16, 2023   0257 Patient seen evaluated emergency department.  Patient with multiple episodes of vomiting throughout the day.  Upon arrival patient found to be actively vomiting with abdominal pain.  Patient workup revealed CT of abdomen Interval development of intrahepatic and extrahepatic pneumobilia with stranding along the common bile duct. Recommend CT abdomen and pelvis with contrast for further  evaluation.  2. Punctate 2 mm density in the common bile duct as described above suspicious for ductal stone. This can be further evaluated with MRCP.  3. Additional chronic findings as above.  Ultrasound was obtained patient had findings consistent with choledocholithiasis The common bile duct is dilated measuring up to 0.7 cm. Echogenic stone is noted within the distal common bile duct.  Patient also with white cell count of 34,000. Patient had worsening acute on chronic kidney renal functions with BUN of 115, creatinine of 5.3, potassium 6.6 with bicarb of 15.  Patient was treated for hyperkalemia along with IV hydration.  At this time patient will be scheduled for ERCP in a.m..  Placed on Zosyn.  Will be admitted to hospital medicine services.  Remained hemodynamically adequate.    [RM]      ED Course User Index  [RM] Neal Champion MD                 Clinical Impression:   Final diagnoses:  [R11.10] Vomiting, unspecified vomiting type, unspecified whether nausea present (Primary)  [N17.9] Acute kidney injury  [K80.50] Choledocholithiasis        ED Disposition Condition    Admit Stable                Neal Champion MD  01/16/23 [id number]

## 2023-01-16 NOTE — HPI
"76 yo F with PMH including CAD, COPD, DM, HFpEF who presents for weakness and vomiting. Patient states she's been in her usual health until this AM when she felt weak and numb particularly her arms which states are usually the strongest. She also endorsed nausea and vomiting followed by abdominal pain after the vomiting. Emesis coffee ground in appearance. Denies fevers/chills. In the ED, patient's vitals were pertinent for HR in 40s though patient asymptomatic, received atropine en route with EMS with minimal effect. Labs reveal leukocytosis 34, k 6.6, and creat 5.3. Imaging reveal "Interval development of intrahepatic and extrahepatic pneumobilia with stranding along the common bile duct" as well as "Punctate 2 mm density in the common bile duct as described above suspicious for ductal stone." Patient started on fluids, antibiotics. Call out was placed to GI. Patient admitted to hospitalist service for further work-up and management  "

## 2023-01-16 NOTE — PROGRESS NOTES
RN has tried to call ENDO multiple times to inform staff of pt's critical K 6.3. Dr. Vanessa and Dr. Faith notified of critical K. Orders placed to shift pt's K, but pt is not on the floor. Anesthesia Dr. Claire is not on secure chat.

## 2023-01-16 NOTE — SUBJECTIVE & OBJECTIVE
Past Medical History:   Diagnosis Date    CAD (coronary artery disease)     Cancer     colon    CHF (congestive heart failure)     Colitis     Colon cancer     COPD (chronic obstructive pulmonary disease)     Decreased hearing     Diabetes mellitus     Diabetes mellitus, type 2     Hypercholesterolemia     Hypertension     Hypoxemia     Insomnia     Obesity     Osteoarthritis        Past Surgical History:   Procedure Laterality Date    ANGIOGRAM, CORONARY, WITH LEFT HEART CATHETERIZATION N/A 2/10/2022    Procedure: Angiogram, Coronary, with Left Heart Cath;  Surgeon: Cristian Benjamin MD;  Location: [facility] CATH/EP LAB;  Service: Cardiology;  Laterality: N/A;    CARDIAC CATHETERIZATION      CHOLECYSTECTOMY      COLECTOMY      CORONARY ANGIOPLASTY      CORONARY STENT PLACEMENT      EXTERNAL EAR SURGERY      EYE SURGERY      FLEXIBLE SIGMOIDOSCOPY N/A 9/19/2019    Procedure: SIGMOIDOSCOPY, FLEXIBLE;  Surgeon: Biju Kramer III, MD;  Location: [facility] ENDO;  Service: Endoscopy;  Laterality: N/A;    HYSTERECTOMY      partial       Review of patient's allergies indicates:   Allergen Reactions    Iodinated contrast media     Strawberries [strawberry] Hives and Itching       No current facility-administered medications on file prior to encounter.     Current Outpatient Medications on File Prior to Encounter   Medication Sig    albuterol (VENTOLIN HFA) 90 mcg/actuation inhaler Inhale 2 puffs into the lungs every 6 (six) hours as needed for Wheezing or Shortness of Breath. Rescue    albuterol-ipratropium (DUO-NEB) 2.5 mg-0.5 mg/3 mL nebulizer solution Take 3 mLs by nebulization every 4 (four) hours as needed for Wheezing or Shortness of Breath. Rescue    allopurinoL (ZYLOPRIM) 100 MG tablet Take 0.5 tablets (50 mg total) by mouth once daily.    amLODIPine (NORVASC) 10 MG tablet Take 0.5 tablets (5 mg total) by mouth once daily.    aspirin (ECOTRIN) 81 MG EC tablet Take 1 tablet (81 mg total) by mouth every morning.     cholestyramine (QUESTRAN) 4 gram packet Take 1 packet (4 g total) by mouth 2 (two) times daily.    coenzyme Q10 100 mg capsule Take 100 mg by mouth every morning.    colchicine (COLCRYS) 0.6 mg tablet Take 1/2 tablet by mouth every other day (Patient taking differently: Take 0.3 mg by mouth every other day. Take 1/2 tablet by mouth every other day)    diltiaZEM (CARDIZEM CD) 120 MG Cp24 Take 1 capsule (120 mg total) by mouth once daily.    [] doxycycline (VIBRAMYCIN) 100 MG Cap Take 1 capsule (100 mg total) by mouth every 12 (twelve) hours. for 5 days    ergocalciferol (VITAMIN D2) 50,000 unit Cap Take 1 capsule (50,000 Units total) by mouth every 7 days.    fish oil-omega-3 fatty acids 300-1,000 mg capsule Take 2 capsules by mouth every morning.    fluticasone-salmeterol diskus inhaler 250-50 mcg Inhale 1 puff into the lungs 2 (two) times daily.    gabapentin (NEURONTIN) 100 MG capsule Take 1 capsule (100 mg total) by mouth every evening.    [] guaiFENesin (MUCINEX) 600 mg 12 hr tablet Take 1 tablet (600 mg total) by mouth 2 (two) times daily. for 5 days    [] guaiFENesin-codeine 100-10 mg/5 ml (TUSSI-ORGANIDIN NR)  mg/5 mL syrup Take 10 mLs by mouth every 4 (four) hours as needed for Cough.    ipratropium-albuteroL (COMBIVENT RESPIMAT)  mcg/actuation inhaler Inhale 2 puffs into the lungs every 4 (four) hours as needed for Shortness of Breath. Rescue    isosorbide mononitrate (IMDUR) 120 MG 24 hr tablet Take 1 tablet (120 mg total) by mouth once daily.    metoprolol succinate (TOPROL-XL) 50 MG 24 hr tablet Take 1 tablet (50 mg total) by mouth once daily.    nitroGLYCERIN 0.4 MG/DOSE TL SPRY (NITROLINGUAL) 400 mcg/spray spray Place 1 spray under the tongue every 5 (five) minutes as needed for Chest pain (max of 3 doses).    pantoprazole (PROTONIX) 40 MG tablet Take 1 tablet (40 mg total) by mouth once daily.    [] predniSONE (DELTASONE) 20 MG tablet Take 2 tablets (40 mg  total) by mouth once daily. for 5 days    ranolazine (RANEXA) 500 MG Tb12 Take 1 tablet (500 mg total) by mouth 2 (two) times daily.    temazepam (RESTORIL) 15 mg Cap Take 1 capsule (15 mg total) by mouth once daily at 6am.    torsemide (DEMADEX) 10 MG Tab Take 1 tablet (10 mg total) by mouth once daily.    traMADoL (ULTRAM) 50 mg tablet Take 1 tablet (50 mg total) by mouth 2 (two) times daily as needed for Pain.    triamcinolone acetonide 0.1% (KENALOG) 0.1 % cream Apply topically 2 (two) times daily. (Patient taking differently: Apply 1 g topically 2 (two) times daily.)    umeclidinium (INCRUSE ELLIPTA) 62.5 mcg/actuation inhalation capsule Inhale 62.5 mcg into the lungs every morning. Controller    vit C/E/Zn/coppr/lutein/zeaxan (PRESERVISION AREDS-2 ORAL) Take 1 tablet by mouth once daily.    [DISCONTINUED] benazepriL (LOTENSIN) 40 MG tablet Take 1 tablet (40 mg total) by mouth once daily.    [DISCONTINUED] cimetidine (TAGAMET) 300 MG tablet Take 1 tablet (300 mg total) by mouth every 6 (six) hours. Take 1 tablet (300 mg total) by mouth starting at 6 PM on Sunday April 17, 2022 (the evening before your angiogram procedure). Then take 1 tablet at 12 AM, then take 1 tablet at 6 AM on Monday April 18th.    [DISCONTINUED] furosemide (LASIX) 20 MG tablet Take 2 tablets (40 mg total) by mouth once daily.    [DISCONTINUED] lisinopriL 10 MG tablet Take 1 tablet (10 mg total) by mouth once daily. HOLD UNTIL OTHERWISE DIRECTED BY PRIMARY CARE PROVIDER    [DISCONTINUED] lovastatin (MEVACOR) 40 MG tablet Take 1 tablet (40 mg total) by mouth every other day.    [DISCONTINUED] ticagrelor (BRILINTA) 90 mg tablet Take 1 tablet (90 mg total) by mouth 2 (two) times a day.     Family History       Problem Relation (Age of Onset)    Febrile seizures Mother    Heart failure Father          Tobacco Use    Smoking status: Former     Packs/day: 3.00     Years: 50.00     Pack years: 150.00     Types: Cigarettes     Start date: 3/30/1964      Quit date: 2006     Years since quittin.8    Smokeless tobacco: Never    Tobacco comments:     ex smoker 15 years   Substance and Sexual Activity    Alcohol use: Yes    Drug use: No    Sexual activity: Never     Review of Systems   Constitutional:  Negative for chills and fever.   HENT:  Negative for hearing loss and sore throat.    Eyes:  Negative for pain and redness.   Respiratory:  Positive for cough. Negative for shortness of breath.    Cardiovascular:  Negative for chest pain and palpitations.   Gastrointestinal:  Positive for abdominal pain, diarrhea and nausea.   Musculoskeletal:  Positive for gait problem. Negative for back pain.   Skin:  Negative for rash and wound.   Neurological:  Negative for dizziness and headaches.   Psychiatric/Behavioral:  Negative for confusion and hallucinations.    Objective:     Vital Signs (Most Recent):  Temp: 97.6 °F (36.4 °C) (23 0006)  Pulse: 62 (23 0330)  Resp: (!) 22 (23 0330)  BP: (!) 111/52 (23 033)  SpO2: 95 % (23)   Vital Signs (24h Range):  Temp:  [97.6 °F (36.4 °C)] 97.6 °F (36.4 °C)  Pulse:  [44-65] 62  Resp:  [16-24] 22  SpO2:  [92 %-99 %] 95 %  BP: (107-129)/(52-58) 111/52     Weight: 84.4 kg (186 lb)  Body mass index is 32.95 kg/m².    Physical Exam  Constitutional:       Appearance: She is ill-appearing.   HENT:      Head: Normocephalic and atraumatic.   Eyes:      Extraocular Movements: Extraocular movements intact.      Conjunctiva/sclera: Conjunctivae normal.   Cardiovascular:      Rate and Rhythm: Normal rate and regular rhythm.   Pulmonary:      Breath sounds: Wheezing present.   Abdominal:      Palpations: Abdomen is soft.      Tenderness: There is abdominal tenderness.   Musculoskeletal:      Cervical back: Neck supple. No tenderness.      Right lower leg: No edema.      Left lower leg: No edema.      Comments: Weakness all extremities   Skin:     General: Skin is warm.      Comments: Patches of erythema  of legs   Neurological:      General: No focal deficit present.      Mental Status: She is alert.      Sensory: No sensory deficit.   Psychiatric:         Mood and Affect: Mood normal.         Judgment: Judgment normal.           Significant Labs: All pertinent labs within the past 24 hours have been reviewed.    Significant Imaging: I have reviewed all pertinent imaging results/findings within the past 24 hours.

## 2023-01-16 NOTE — NURSING TRANSFER
Nursing Transfer Note      1/16/2023     Reason patient is being transferred: ERCP    Transfer To: endo     Transfer via stretcher    Transfer with cardiac monitoring, 2L NC    Transported by TECH    Medicines sent: IV NaBicarb and zosyn, disconnected from pt, pole sent with pt    Any special needs or follow-up needed: connect pt back to fluids    Chart send with patient: Yes    Notified: left vm for daughter at 0900

## 2023-01-16 NOTE — PLAN OF CARE
[facility]  Initial Discharge Assessment       Primary Care Provider: Khadar Dwyer MD    Admission Diagnosis: Vomiting, unspecified vomiting type, unspecified whether nausea present [R11.10]    Admission Date: 1/15/2023  Expected Discharge Date:     Discharge Barriers Identified: None      CM met with Pt at bedside to complete discharge assessment. Info verified on facesheet as correct. Pt uses walker, cane, and oxygen provided by [facility]. Pt uses [facility] and would like to resume services upon discharge. Pt daughter Marisol Kerr will provide transport upon discharge. No other needs identified at this time. CM to follow.     Payor: MEDICARE / Plan: MEDICARE PART A & B / Product Type: Government /     Extended Emergency Contact Information  Primary Emergency Contact: Marisol Kerr  Address: [address] [place] [address] [place]  Mobile Phone: [phone]  Relation: Daughter  Preferred language: English   needed? No    Discharge Plan A: Home Health  Discharge Plan B: Home Health      [facility] [address] [facility]  [address] [address]  Phone: [phone] Fax: [phone]    [facility] - [place], [place] - [address]  [address] [address]  Phone: [phone] Fax: [phone]      Initial Assessment (most recent)       Adult Discharge Assessment - 01/16/23 0938          Discharge Assessment    Assessment Type Discharge Planning Assessment     Confirmed/corrected address, phone number and insurance Yes     Confirmed Demographics Correct on Facesheet     Source of Information patient     Communicated LUZ with patient/caregiver Date not available/Unable to determine     Reason For Admission vomiting     People in Home alone     Facility Arrived From: home     Do you expect to return to your current living situation? Yes     Do you have help at  home or someone to help you manage your care at home? Yes     Who are your caregiver(s) and their phone number(s)? Marisol Kerr daughter [phone]     Prior to hospitilization cognitive status: Alert/Oriented     Current cognitive status: Alert/Oriented     Equipment Currently Used at Home walker, rolling;cane, straight;oxygen     Readmission within 30 days? Yes     Patient currently being followed by outpatient case management? No     Do you currently have service(s) that help you manage your care at home? Yes     Name and Contact number of agency [facility]      Do you take prescription medications? Yes     Do you have prescription coverage? Yes     Coverage Medicare and [place] Medicaid     Do you have any problems affording any of your prescribed medications? No     Is the patient taking medications as prescribed? yes     Who is going to help you get home at discharge? Marisol Kerr     How do you get to doctors appointments? family or friend will provide     Are you on dialysis? No     Do you take coumadin? No     Discharge Plan A Home Health     Discharge Plan B Home Health     DME Needed Upon Discharge  none     Discharge Plan discussed with: Patient     Discharge Barriers Identified None        Physical Activity    On average, how many days per week do you engage in moderate to strenuous exercise (like a brisk walk)? 0 days     On average, how many minutes do you engage in exercise at this level? 0 min        Financial Resource Strain    How hard is it for you to pay for the very basics like food, housing, medical care, and heating? Not hard at all        Housing Stability    In the last 12 months, was there a time when you were not able to pay the mortgage or rent on time? No     In the last 12 months, how many places have you lived? 1     In the last 12 months, was there a time when you did not have a steady place to sleep or slept in a shelter (including now)? No        Transportation Needs    In the past 12  months, has lack of transportation kept you from medical appointments or from getting medications? No     In the past 12 months, has lack of transportation kept you from meetings, work, or from getting things needed for daily living? No        Food Insecurity    Within the past 12 months, you worried that your food would run out before you got the money to buy more. Never true     Within the past 12 months, the food you bought just didn't last and you didn't have money to get more. Never true        Stress    Do you feel stress - tense, restless, nervous, or anxious, or unable to sleep at night because your mind is troubled all the time - these days? To some extent        Social Connections    In a typical week, how many times do you talk on the phone with family, friends, or neighbors? More than three times a week     How often do you get together with friends or relatives? More than three times a week     How often do you attend Moravian or Amish services? Never     Do you belong to any clubs or organizations such as Moravian groups, unions, fraternal or athletic groups, or school groups? No     How often do you attend meetings of the clubs or organizations you belong to? Never     Are you , , , , never , or living with a partner?         Alcohol Use    Q1: How often do you have a drink containing alcohol? Never     Q2: How many drinks containing alcohol do you have on a typical day when you are drinking? Patient does not drink     Q3: How often do you have six or more drinks on one occasion? Never

## 2023-01-17 ENCOUNTER — CLINICAL SUPPORT (OUTPATIENT)
Dept: CARDIOLOGY | Facility: HOSPITAL | Age: 78
DRG: 444 | End: 2023-01-17
Attending: SPECIALIST
Payer: MEDICARE

## 2023-01-17 VITALS — BODY MASS INDEX: 32.78 KG/M2 | HEIGHT: 63 IN | WEIGHT: 185 LBS

## 2023-01-17 PROBLEM — N39.0 UTI (URINARY TRACT INFECTION): Status: ACTIVE | Noted: 2023-01-17

## 2023-01-17 LAB
ALBUMIN SERPL BCP-MCNC: 2.2 G/DL (ref 3.5–5.2)
ALP SERPL-CCNC: 45 U/L (ref 55–135)
ALT SERPL W/O P-5'-P-CCNC: 12 U/L (ref 10–44)
ANION GAP SERPL CALC-SCNC: 11 MMOL/L (ref 8–16)
AORTIC ROOT ANNULUS: 3.24 CM
AORTIC VALVE CUSP SEPERATION: 1.71 CM
AST SERPL-CCNC: 17 U/L (ref 10–40)
AV INDEX (PROSTH): 1.14
AV MEAN GRADIENT: 6 MMHG
AV PEAK GRADIENT: 11 MMHG
AV VALVE AREA: 3.58 CM2
AV VELOCITY RATIO: 0.98
BILIRUB SERPL-MCNC: 0.5 MG/DL (ref 0.1–1)
BSA FOR ECHO PROCEDURE: 1.93 M2
BUN SERPL-MCNC: 52 MG/DL (ref 8–23)
CALCIUM SERPL-MCNC: 7.5 MG/DL (ref 8.7–10.5)
CHLORIDE SERPL-SCNC: 97 MMOL/L (ref 95–110)
CO2 SERPL-SCNC: 30 MMOL/L (ref 23–29)
CREAT SERPL-MCNC: 3.6 MG/DL (ref 0.5–1.4)
CV ECHO LV RWT: 0.71 CM
DOP CALC AO PEAK VEL: 1.65 M/S
DOP CALC AO VTI: 18 CM
DOP CALC LVOT AREA: 3.1 CM2
DOP CALC LVOT DIAMETER: 2 CM
DOP CALC LVOT PEAK VEL: 1.61 M/S
DOP CALC LVOT STROKE VOLUME: 64.37 CM3
DOP CALCLVOT PEAK VEL VTI: 20.5 CM
E WAVE DECELERATION TIME: 181.34 MSEC
E/A RATIO: 0.6
E/E' RATIO: 14.18 M/S
ECHO LV POSTERIOR WALL: 1.27 CM (ref 0.6–1.1)
EJECTION FRACTION: 70 %
ERYTHROCYTE [DISTWIDTH] IN BLOOD BY AUTOMATED COUNT: 12.9 % (ref 11.5–14.5)
EST. GFR  (NO RACE VARIABLE): 12.6 ML/MIN/1.73 M^2
FRACTIONAL SHORTENING: 57 % (ref 28–44)
GLUCOSE SERPL-MCNC: 102 MG/DL (ref 70–110)
GLUCOSE SERPL-MCNC: 127 MG/DL (ref 70–110)
HCT VFR BLD AUTO: 31.9 % (ref 37–48.5)
HGB BLD-MCNC: 10.1 G/DL (ref 12–16)
INTERVENTRICULAR SEPTUM: 1.81 CM (ref 0.6–1.1)
LEFT ATRIUM SIZE: 2.48 CM
LEFT ATRIUM VOLUME INDEX MOD: 25.8 ML/M2
LEFT ATRIUM VOLUME MOD: 48.26 CM3
LEFT INTERNAL DIMENSION IN SYSTOLE: 1.54 CM (ref 2.1–4)
LEFT VENTRICLE DIASTOLIC VOLUME INDEX: 40.75 ML/M2
LEFT VENTRICLE DIASTOLIC VOLUME: 76.2 ML
LEFT VENTRICLE MASS INDEX: 112 G/M2
LEFT VENTRICLE SYSTOLIC VOLUME INDEX: 16.7 ML/M2
LEFT VENTRICLE SYSTOLIC VOLUME: 31.2 ML
LEFT VENTRICULAR INTERNAL DIMENSION IN DIASTOLE: 3.6 CM (ref 3.5–6)
LEFT VENTRICULAR MASS: 209.78 G
LV LATERAL E/E' RATIO: 11.14 M/S
LV SEPTAL E/E' RATIO: 19.5 M/S
LVOT MG: 5.46 MMHG
LVOT MV: 1.08 CM/S
MCH RBC QN AUTO: 29.4 PG (ref 27–31)
MCHC RBC AUTO-ENTMCNC: 31.7 G/DL (ref 32–36)
MCV RBC AUTO: 93 FL (ref 82–98)
MV PEAK A VEL: 1.29 M/S
MV PEAK E VEL: 0.78 M/S
MV STENOSIS PRESSURE HALF TIME: 52.59 MS
MV VALVE AREA P 1/2 METHOD: 4.18 CM2
PLATELET # BLD AUTO: 109 K/UL (ref 150–450)
PMV BLD AUTO: 11.2 FL (ref 9.2–12.9)
POTASSIUM SERPL-SCNC: 3.9 MMOL/L (ref 3.5–5.1)
PROT SERPL-MCNC: 4.9 G/DL (ref 6–8.4)
PV MV: 1.37 M/S
PV PEAK VELOCITY: 1.93 CM/S
RA PRESSURE: 3 MMHG
RBC # BLD AUTO: 3.44 M/UL (ref 4–5.4)
RV TISSUE DOPPLER FREE WALL SYSTOLIC VELOCITY 1 (APICAL 4 CHAMBER VIEW): 0.02 CM/S
SODIUM SERPL-SCNC: 138 MMOL/L (ref 136–145)
TDI LATERAL: 0.07 M/S
TDI SEPTAL: 0.04 M/S
TDI: 0.06 M/S
TRICUSPID ANNULAR PLANE SYSTOLIC EXCURSION: 2.72 CM
TROPONIN I SERPL HS-MCNC: 133.4 PG/ML (ref 0–14.9)
TROPONIN I SERPL HS-MCNC: 271.7 PG/ML (ref 0–14.9)
TROPONIN I SERPL HS-MCNC: 317.6 PG/ML (ref 0–14.9)
WBC # BLD AUTO: 16.6 K/UL (ref 3.9–12.7)

## 2023-01-17 PROCEDURE — 94761 N-INVAS EAR/PLS OXIMETRY MLT: CPT

## 2023-01-17 PROCEDURE — 63600175 PHARM REV CODE 636 W HCPCS: Performed by: STUDENT IN AN ORGANIZED HEALTH CARE EDUCATION/TRAINING PROGRAM

## 2023-01-17 PROCEDURE — 93306 TTE W/DOPPLER COMPLETE: CPT

## 2023-01-17 PROCEDURE — 93306 ECHO (CUPID ONLY): ICD-10-PCS | Mod: 26,,, | Performed by: SPECIALIST

## 2023-01-17 PROCEDURE — 85027 COMPLETE CBC AUTOMATED: CPT | Performed by: INTERNAL MEDICINE

## 2023-01-17 PROCEDURE — 93010 EKG 12-LEAD: ICD-10-PCS | Mod: ,,, | Performed by: SPECIALIST

## 2023-01-17 PROCEDURE — 99900035 HC TECH TIME PER 15 MIN (STAT)

## 2023-01-17 PROCEDURE — 99223 PR INITIAL HOSPITAL CARE,LEVL III: ICD-10-PCS | Mod: ,,, | Performed by: SPECIALIST

## 2023-01-17 PROCEDURE — 25000003 PHARM REV CODE 250: Performed by: INTERNAL MEDICINE

## 2023-01-17 PROCEDURE — 63600175 PHARM REV CODE 636 W HCPCS: Performed by: INTERNAL MEDICINE

## 2023-01-17 PROCEDURE — 99223 1ST HOSP IP/OBS HIGH 75: CPT | Mod: ,,, | Performed by: SPECIALIST

## 2023-01-17 PROCEDURE — 93010 ELECTROCARDIOGRAM REPORT: CPT | Mod: ,,, | Performed by: SPECIALIST

## 2023-01-17 PROCEDURE — 25000242 PHARM REV CODE 250 ALT 637 W/ HCPCS: Performed by: STUDENT IN AN ORGANIZED HEALTH CARE EDUCATION/TRAINING PROGRAM

## 2023-01-17 PROCEDURE — 94799 UNLISTED PULMONARY SVC/PX: CPT

## 2023-01-17 PROCEDURE — 20000000 HC ICU ROOM

## 2023-01-17 PROCEDURE — 25000003 PHARM REV CODE 250: Performed by: SPECIALIST

## 2023-01-17 PROCEDURE — 84484 ASSAY OF TROPONIN QUANT: CPT | Performed by: INTERNAL MEDICINE

## 2023-01-17 PROCEDURE — 94640 AIRWAY INHALATION TREATMENT: CPT

## 2023-01-17 PROCEDURE — 25000003 PHARM REV CODE 250: Performed by: STUDENT IN AN ORGANIZED HEALTH CARE EDUCATION/TRAINING PROGRAM

## 2023-01-17 PROCEDURE — 93306 TTE W/DOPPLER COMPLETE: CPT | Mod: 26,,, | Performed by: SPECIALIST

## 2023-01-17 PROCEDURE — 99900031 HC PATIENT EDUCATION (STAT)

## 2023-01-17 PROCEDURE — 80053 COMPREHEN METABOLIC PANEL: CPT | Performed by: INTERNAL MEDICINE

## 2023-01-17 PROCEDURE — 93005 ELECTROCARDIOGRAM TRACING: CPT | Performed by: SPECIALIST

## 2023-01-17 PROCEDURE — 84484 ASSAY OF TROPONIN QUANT: CPT | Mod: 91 | Performed by: SPECIALIST

## 2023-01-17 RX ORDER — NITROGLYCERIN 20 MG/100ML
0-400 INJECTION INTRAVENOUS CONTINUOUS
Status: DISCONTINUED | OUTPATIENT
Start: 2023-01-17 | End: 2023-01-17

## 2023-01-17 RX ORDER — METOPROLOL TARTRATE 1 MG/ML
5 INJECTION, SOLUTION INTRAVENOUS ONCE
Status: COMPLETED | OUTPATIENT
Start: 2023-01-17 | End: 2023-01-17

## 2023-01-17 RX ORDER — METOPROLOL TARTRATE 50 MG/1
50 TABLET ORAL 3 TIMES DAILY
Status: DISCONTINUED | OUTPATIENT
Start: 2023-01-17 | End: 2023-01-19 | Stop reason: HOSPADM

## 2023-01-17 RX ORDER — HYDROMORPHONE HYDROCHLORIDE 1 MG/ML
0.5 INJECTION, SOLUTION INTRAMUSCULAR; INTRAVENOUS; SUBCUTANEOUS ONCE
Status: COMPLETED | OUTPATIENT
Start: 2023-01-17 | End: 2023-01-17

## 2023-01-17 RX ADMIN — PIPERACILLIN SODIUM AND TAZOBACTAM SODIUM 3.38 G: 3; .375 INJECTION, POWDER, LYOPHILIZED, FOR SOLUTION INTRAVENOUS at 07:01

## 2023-01-17 RX ADMIN — HEPARIN SODIUM 5000 UNITS: 5000 INJECTION INTRAVENOUS; SUBCUTANEOUS at 05:01

## 2023-01-17 RX ADMIN — TICAGRELOR 90 MG: 90 TABLET ORAL at 05:01

## 2023-01-17 RX ADMIN — NITROGLYCERIN 1 INCH: 20 OINTMENT TOPICAL at 12:01

## 2023-01-17 RX ADMIN — HEPARIN SODIUM 5000 UNITS: 5000 INJECTION INTRAVENOUS; SUBCUTANEOUS at 02:01

## 2023-01-17 RX ADMIN — MUPIROCIN 1 G: 20 OINTMENT TOPICAL at 08:01

## 2023-01-17 RX ADMIN — IPRATROPIUM BROMIDE AND ALBUTEROL SULFATE 3 ML: 2.5; .5 SOLUTION RESPIRATORY (INHALATION) at 08:01

## 2023-01-17 RX ADMIN — IPRATROPIUM BROMIDE AND ALBUTEROL SULFATE 3 ML: 2.5; .5 SOLUTION RESPIRATORY (INHALATION) at 02:01

## 2023-01-17 RX ADMIN — METOPROLOL TARTRATE 50 MG: 50 TABLET, FILM COATED ORAL at 02:01

## 2023-01-17 RX ADMIN — SODIUM ZIRCONIUM CYCLOSILICATE 10 G: 5 POWDER, FOR SUSPENSION ORAL at 08:01

## 2023-01-17 RX ADMIN — NITROGLYCERIN 1 INCH: 20 OINTMENT TOPICAL at 05:01

## 2023-01-17 RX ADMIN — HYDROMORPHONE HYDROCHLORIDE 0.5 MG: 1 INJECTION, SOLUTION INTRAMUSCULAR; INTRAVENOUS; SUBCUTANEOUS at 10:01

## 2023-01-17 RX ADMIN — PANTOPRAZOLE SODIUM 40 MG: 40 TABLET, DELAYED RELEASE ORAL at 05:01

## 2023-01-17 RX ADMIN — NITROGLYCERIN 1 INCH: 20 OINTMENT TOPICAL at 11:01

## 2023-01-17 RX ADMIN — Medication 6 MG: at 10:01

## 2023-01-17 RX ADMIN — ASPIRIN 81 MG: 81 TABLET, COATED ORAL at 07:01

## 2023-01-17 RX ADMIN — METOPROLOL TARTRATE 5 MG: 1 INJECTION, SOLUTION INTRAVENOUS at 12:01

## 2023-01-17 RX ADMIN — HEPARIN SODIUM 5000 UNITS: 5000 INJECTION INTRAVENOUS; SUBCUTANEOUS at 09:01

## 2023-01-17 RX ADMIN — PIPERACILLIN SODIUM AND TAZOBACTAM SODIUM 3.38 G: 3; .375 INJECTION, POWDER, LYOPHILIZED, FOR SOLUTION INTRAVENOUS at 08:01

## 2023-01-17 RX ADMIN — METOPROLOL TARTRATE 50 MG: 50 TABLET, FILM COATED ORAL at 08:01

## 2023-01-17 NOTE — RESPIRATORY THERAPY
01/16/23 2112   Patient Assessment/Suction   Level of Consciousness (AVPU) alert   Respiratory Effort Normal;Unlabored   Expansion/Accessory Muscles/Retractions no use of accessory muscles   All Lung Fields Breath Sounds equal bilaterally;diminished   Rhythm/Pattern, Respiratory unlabored   Cough Frequency infrequent   PRE-TX-O2   Device (Oxygen Therapy) nasal cannula   Flow (L/min) 2   SpO2 97 %   Pulse Oximetry Type Continuous   $ Pulse Oximetry - Multiple Charge Pulse Oximetry - Multiple   Pulse 83   Resp 16   Aerosol Therapy   $ Aerosol Therapy Charges Aerosol Treatment   Daily Review of Necessity (SVN) completed   Respiratory Treatment Status (SVN) given   Treatment Route (SVN) mask   Patient Position (SVN) semi-Solorzano's   Post Treatment Assessment (SVN) breath sounds unchanged   Signs of Intolerance (SVN) none   Breath Sounds Post-Respiratory Treatment   Post-treatment Heart Rate (beats/min) 77   Post-treatment Resp Rate (breaths/min) 18   Education   $ Education Bronchodilator;15 min   Respiratory Evaluation   $ Care Plan Tech Time 15 min   $ Eval/Re-eval Charges Re-evaluation

## 2023-01-17 NOTE — SUBJECTIVE & OBJECTIVE
Interval History:     Review of Systems   Constitutional:  Positive for fatigue. Negative for activity change and appetite change.   HENT:  Negative for congestion and dental problem.    Eyes:  Negative for discharge and itching.   Respiratory:  Negative for shortness of breath.    Cardiovascular:  Negative for chest pain.   Gastrointestinal:  Negative for abdominal distention and abdominal pain.   Endocrine: Negative for cold intolerance.   Genitourinary:  Negative for difficulty urinating and dysuria.   Musculoskeletal:  Negative for arthralgias and back pain.   Skin:  Negative for color change.   Neurological:  Negative for dizziness and facial asymmetry.   Hematological:  Negative for adenopathy.   Psychiatric/Behavioral:  Negative for agitation and behavioral problems.    Objective:     Vital Signs (Most Recent):  Temp: 98.7 °F (37.1 °C) (01/17/23 1130)  Pulse: 89 (01/17/23 1430)  Resp: (!) 21 (01/17/23 1430)  BP: (!) 159/69 (01/17/23 1430)  SpO2: 98 % (01/17/23 1430)   Vital Signs (24h Range):  Temp:  [97.9 °F (36.6 °C)-99.3 °F (37.4 °C)] 98.7 °F (37.1 °C)  Pulse:  [] 89  Resp:  [16-27] 21  SpO2:  [94 %-99 %] 98 %  BP: (108-202)/(53-95) 159/69     Weight: 84.3 kg (185 lb 13.6 oz)  Body mass index is 32.92 kg/m².    Intake/Output Summary (Last 24 hours) at 1/17/2023 1554  Last data filed at 1/17/2023 1100  Gross per 24 hour   Intake 1216.8 ml   Output 3250 ml   Net -2033.2 ml      Physical Exam  Vitals and nursing note reviewed.   Constitutional:       General: She is not in acute distress.  HENT:      Head: Atraumatic.      Right Ear: External ear normal.      Left Ear: External ear normal.      Nose: Nose normal.      Mouth/Throat:      Mouth: Mucous membranes are moist.   Cardiovascular:      Rate and Rhythm: Normal rate.   Pulmonary:      Effort: Pulmonary effort is normal.   Abdominal:      Palpations: Abdomen is soft.   Musculoskeletal:         General: Normal range of motion.      Cervical back:  Normal range of motion.   Skin:     General: Skin is warm.   Neurological:      Mental Status: She is alert and oriented to person, place, and time.   Psychiatric:         Behavior: Behavior normal.       Significant Labs: All pertinent labs within the past 24 hours have been reviewed.  BMP:   Recent Labs   Lab 01/16/23  0750 01/16/23  0832 01/17/23  0504   GLU 75  75   < > 102   *  134*   < > 138   K 6.3*  6.3*   < > 3.9   CL 99  99   < > 97   CO2 18*  18*   < > 30*   *  112*   < > 52*   CREATININE 5.6*  5.6*   < > 3.6*   CALCIUM 7.0*  7.0*   < > 7.5*   MG 1.7  --   --     < > = values in this interval not displayed.     CBC:   Recent Labs   Lab 01/16/23  0025 01/16/23  0750 01/17/23  0504   WBC 34.16* 39.12* 16.60*   HGB 12.2 11.7* 10.1*   HCT 39.8 38.5 31.9*    164 109*       Significant Imaging: I have reviewed all pertinent imaging results/findings within the past 24 hours.

## 2023-01-17 NOTE — ASSESSMENT & PLAN NOTE
Body mass index is 32.95 kg/m². Morbid obesity complicates all aspects of disease management from diagnostic modalities to treatment.

## 2023-01-17 NOTE — PLAN OF CARE
01/17/23 1400   Patient Assessment/Suction   Level of Consciousness (AVPU) alert   Respiratory Effort Unlabored;Normal   Expansion/Accessory Muscles/Retractions no use of accessory muscles   All Lung Fields Breath Sounds equal bilaterally;clear   PRE-TX-O2   Device (Oxygen Therapy) nasal cannula   Flow (L/min) 2   SpO2 99 %   Pulse Oximetry Type Continuous   $ Pulse Oximetry - Multiple Charge Pulse Oximetry - Multiple   Pulse 91   Resp (!) 21   BP (!) 158/67   Aerosol Therapy   $ Aerosol Therapy Charges Aerosol Treatment   Daily Review of Necessity (SVN) completed   Respiratory Treatment Status (SVN) given   Treatment Route (SVN) mask   Post Treatment Assessment (SVN) breath sounds improved   Signs of Intolerance (SVN) none   Breath Sounds Post-Respiratory Treatment   Throughout All Fields Post-Treatment All Fields   Throughout All Fields Post-Treatment aeration increased   Post-treatment Heart Rate (beats/min) 76   Post-treatment Resp Rate (breaths/min) 20   Education   $ Education Bronchodilator;15 min   Respiratory Evaluation   $ Care Plan Tech Time 15 min   $ Eval/Re-eval Charges Evaluation   Evaluation For New Orders

## 2023-01-17 NOTE — NURSING
Pt arrived to room 3030 via bed. No distress noted. Personal belongings at bedside. Pt AAOx4. Denies chest pain at this time. Pt cleaned of stool. Photos taken of buttocks and BLE. Afebrile. VSS. ECHO tech at bedside.

## 2023-01-17 NOTE — ASSESSMENT & PLAN NOTE
Mostly pre renal   Renal panels improving   Pt had emergent HD for persistent hyperkalemia on 1/17  If stable pt can go home tomorrow AM

## 2023-01-17 NOTE — PROGRESS NOTES
"[facility] Medicine  Progress Note    Patient Name: Marisol Kerr  MRN: [id number]  Patient Class: IP- Inpatient   Admission Date: 1/15/2023  Length of Stay: 1 days  Attending Physician: Dean Vanessa MD  Primary Care Provider: Khadar Dwyer MD        Subjective:     Principal Problem:ISSA (acute kidney injury)        HPI:  78 yo F with PMH including CAD, COPD, DM, HFpEF who presents for weakness and vomiting. Patient states she's been in her usual health until this AM when she felt weak and numb particularly her arms which states are usually the strongest. She also endorsed nausea and vomiting followed by abdominal pain after the vomiting. Emesis coffee ground in appearance. Denies fevers/chills. In the ED, patient's vitals were pertinent for HR in 40s though patient asymptomatic, received atropine en route with EMS with minimal effect. Labs reveal leukocytosis 34, k 6.6, and creat 5.3. Imaging reveal "Interval development of intrahepatic and extrahepatic pneumobilia with stranding along the common bile duct" as well as "Punctate 2 mm density in the common bile duct as described above suspicious for ductal stone." Patient started on fluids, antibiotics. Call out was placed to GI. Patient admitted to hospitalist service for further work-up and management      Overview/Hospital Course:  1/16  ERCP was successful  Pt had emergent dialysis because of persistent hyperkalemia  Pt is more stable Now  Erich episodes resolved     1/17  Pt today had chest pain  Restarted on beta blockers  Also on NG paste      Interval History:     Review of Systems   Constitutional:  Positive for fatigue. Negative for activity change and appetite change.   HENT:  Negative for congestion and dental problem.    Eyes:  Negative for discharge and itching.   Respiratory:  Negative for shortness of breath.    Cardiovascular:  Negative for chest pain.   Gastrointestinal:  Negative for abdominal distention and abdominal pain. "   Endocrine: Negative for cold intolerance.   Genitourinary:  Negative for difficulty urinating and dysuria.   Musculoskeletal:  Negative for arthralgias and back pain.   Skin:  Negative for color change.   Neurological:  Negative for dizziness and facial asymmetry.   Hematological:  Negative for adenopathy.   Psychiatric/Behavioral:  Negative for agitation and behavioral problems.    Objective:     Vital Signs (Most Recent):  Temp: 98.7 °F (37.1 °C) (01/17/23 1130)  Pulse: 89 (01/17/23 1430)  Resp: (!) 21 (01/17/23 1430)  BP: (!) 159/69 (01/17/23 1430)  SpO2: 98 % (01/17/23 1430)   Vital Signs (24h Range):  Temp:  [97.9 °F (36.6 °C)-99.3 °F (37.4 °C)] 98.7 °F (37.1 °C)  Pulse:  [] 89  Resp:  [16-27] 21  SpO2:  [94 %-99 %] 98 %  BP: (108-202)/(53-95) 159/69     Weight: 84.3 kg (185 lb 13.6 oz)  Body mass index is 32.92 kg/m².    Intake/Output Summary (Last 24 hours) at 1/17/2023 1554  Last data filed at 1/17/2023 1100  Gross per 24 hour   Intake 1216.8 ml   Output 3250 ml   Net -2033.2 ml      Physical Exam  Vitals and nursing note reviewed.   Constitutional:       General: She is not in acute distress.  HENT:      Head: Atraumatic.      Right Ear: External ear normal.      Left Ear: External ear normal.      Nose: Nose normal.      Mouth/Throat:      Mouth: Mucous membranes are moist.   Cardiovascular:      Rate and Rhythm: Normal rate.   Pulmonary:      Effort: Pulmonary effort is normal.   Abdominal:      Palpations: Abdomen is soft.   Musculoskeletal:         General: Normal range of motion.      Cervical back: Normal range of motion.   Skin:     General: Skin is warm.   Neurological:      Mental Status: She is alert and oriented to person, place, and time.   Psychiatric:         Behavior: Behavior normal.       Significant Labs: All pertinent labs within the past 24 hours have been reviewed.  BMP:   Recent Labs   Lab 01/16/23  0750 01/16/23  0832 01/17/23  0504   GLU 75  75   < > 102   *  134*   <  > 138   K 6.3*  6.3*   < > 3.9   CL 99  99   < > 97   CO2 18*  18*   < > 30*   *  112*   < > 52*   CREATININE 5.6*  5.6*   < > 3.6*   CALCIUM 7.0*  7.0*   < > 7.5*   MG 1.7  --   --     < > = values in this interval not displayed.     CBC:   Recent Labs   Lab 01/16/23  0025 01/16/23  0750 01/17/23  0504   WBC 34.16* 39.12* 16.60*   HGB 12.2 11.7* 10.1*   HCT 39.8 38.5 31.9*    164 109*       Significant Imaging: I have reviewed all pertinent imaging results/findings within the past 24 hours.      Assessment/Plan:      * ISSA (acute kidney injury)  Mostly pre renal   Renal panels improving   Pt had emergent HD for persistent hyperkalemia on 1/17  If stable pt can go home tomorrow AM       Hyperkalemia  Had emergent HD for persistent hyperkalemia  Pt had Brash syndrome(bradycardia, renal failure, AV juaquin blockade, shock, and hyperkalemia)      Choledocholithiasis  S/p biliary sphincterotomy / The biliary tree was swept  Maintain iv Zosyn  When she goes home she need pO abx      UTI (urinary tract infection)  Maintain iv abx  Follow up final urine culture results      Bradycardia  Resolved   Restarted on bet blockers on 1/17      Class 2 obesity in adult  Body mass index is 32.95 kg/m². Morbid obesity complicates all aspects of disease management from diagnostic modalities to treatment.       Coronary artery disease, multivessel with history of previous PCI  Significant coronary disease with blockages in the right coronary and circumflex mid LAD (per recent angiogram;coronary)  Being treated medically with subendocardial ischemia because she had chest pain on 1/17  Cardiology  on board        VTE Risk Mitigation (From admission, onward)         Ordered     heparin (porcine) injection 4,000 Units  As needed (PRN)         01/16/23 1751     heparin (porcine) injection 5,000 Units  Every 8 hours         01/16/23 0557     IP VTE HIGH RISK PATIENT  Once         01/16/23 0557     Place sequential  compression device  Until discontinued         01/16/23 0557                Discharge Planning   LUZ:      Code Status: Full Code   Is the patient medically ready for discharge?:     Reason for patient still in hospital (select all that apply):  Discharge Plan A: Home Health                  Dean Vanessa MD  Department of Hospital Medicine   [facility]

## 2023-01-17 NOTE — ASSESSMENT & PLAN NOTE
Had emergent HD for persistent hyperkalemia  Pt had Brash syndrome(bradycardia, renal failure, AV juaquin blockade, shock, and hyperkalemia)

## 2023-01-17 NOTE — ASSESSMENT & PLAN NOTE
S/p biliary sphincterotomy / The biliary tree was swept  Maintain iv Zosyn  When she goes home she need pO abx

## 2023-01-17 NOTE — PROGRESS NOTES
"Nephrology Progress Note        Patient Name: Marisol Kerr  MRN: [id number]    Patient Class: IP- Inpatient   Admission Date: 1/15/2023  Length of Stay: 1 days  Date of Service: 1/17/2023    Attending Physician: Dean Vanessa MD  Primary Care Provider: Khadar Dwyer MD    Reason for Consult: ladan/hyperkalemia    SUBJECTIVE:     HPI: 77F with CAD, COPD, DM, HFpEF presents for weakness and vomiting. Patient states she's been in her usual health until the AM of admission when she felt weak and numb, particularly her arms which states are usually the strongest. She also endorsed nausea and vomiting, followed by abdominal pain. Emesis coffee ground in appearance. Denies fevers/chills. In the ED, patient's vitals were pertinent for HR in 40s though patient asymptomatic, received atropine en route with EMS with minimal effect. Labs reveal leukocytosis 34, k 6.6, creat 5.3, baseline around 1. Imaging reveal "Interval development of intrahepatic and extrahepatic pneumobilia with stranding along the common bile duct" as well as "Punctate 2 mm density in the common bile duct as described above suspicious for ductal stone." Kidneys and bladder are not obstructed. Patient started on fluids, antibiotics. Call out was placed to GI. Received temporizing therapy with ca gluconate, lokelma, albuterol. Repeat labs are pending.    Addendum @ 11:12 AM D/w floor nurse on 1100. Patient was apparently intubated and went for a procedure with GI. Repeat K I still very high and it seems she did not respond to any of the temporizing measures administered in ER. Per nurse there was some urine in her daniel prior to departure for procedure. I instructed the nurse to consult anesthesiology for line placement for emergency dialysis and as soon as she is out of the procedure, we will have to repeat stat labs and do more temporizing measures until we can do dialysis.    Addendum @ 4:10 PM Seen on HD today, tolerating well. Making some urine.    1/17  Had " emergent HD yesterday 2/2 hyperkalemia.  VSS today, K+ at goal.  On bicarb gtt, UOP 1.7L.  Feels better today, no acute HD needs noted.    Past Medical History:   Diagnosis Date    CAD (coronary artery disease)     Cancer     colon    CHF (congestive heart failure)     Colitis     Colon cancer     COPD (chronic obstructive pulmonary disease)     Decreased hearing     Diabetes mellitus     Diabetes mellitus, type 2     Hypercholesterolemia     Hypertension     Hypoxemia     Insomnia     Obesity     Osteoarthritis      Past Surgical History:   Procedure Laterality Date    ANGIOGRAM, CORONARY, WITH LEFT HEART CATHETERIZATION N/A 2/10/2022    Procedure: Angiogram, Coronary, with Left Heart Cath;  Surgeon: Cristian Benjamin MD;  Location: [facility] CATH/EP LAB;  Service: Cardiology;  Laterality: N/A;    CARDIAC CATHETERIZATION      CHOLECYSTECTOMY      COLECTOMY      CORONARY ANGIOPLASTY      CORONARY STENT PLACEMENT      EXTERNAL EAR SURGERY      EYE SURGERY      FLEXIBLE SIGMOIDOSCOPY N/A 2019    Procedure: SIGMOIDOSCOPY, FLEXIBLE;  Surgeon: Biju Kramer III, MD;  Location: [facility] ENDO;  Service: Endoscopy;  Laterality: N/A;    HYSTERECTOMY      partial     Family History   Problem Relation Age of Onset    Febrile seizures Mother     Heart failure Father      Social History     Tobacco Use    Smoking status: Former     Packs/day: 3.00     Years: 50.00     Pack years: 150.00     Types: Cigarettes     Start date: 3/30/1964     Quit date: 2006     Years since quittin.8    Smokeless tobacco: Never    Tobacco comments:     ex smoker 15 years   Substance Use Topics    Alcohol use: Yes    Drug use: No       Review of patient's allergies indicates:   Allergen Reactions    Iodinated contrast media     Strawberries [strawberry] Hives and Itching       Outpatient meds:  No current facility-administered medications on file prior to encounter.     Current Outpatient Medications on File Prior to Encounter    Medication Sig Dispense Refill    albuterol (VENTOLIN HFA) 90 mcg/actuation inhaler Inhale 2 puffs into the lungs every 6 (six) hours as needed for Wheezing or Shortness of Breath. Rescue 36 g 3    albuterol-ipratropium (DUO-NEB) 2.5 mg-0.5 mg/3 mL nebulizer solution Take 3 mLs by nebulization every 4 (four) hours as needed for Wheezing or Shortness of Breath. Rescue 120 each 6    allopurinoL (ZYLOPRIM) 100 MG tablet Take 0.5 tablets (50 mg total) by mouth once daily. 45 tablet 1    amLODIPine (NORVASC) 10 MG tablet Take 0.5 tablets (5 mg total) by mouth once daily. 15 tablet 11    aspirin (ECOTRIN) 81 MG EC tablet Take 1 tablet (81 mg total) by mouth every morning. 90 tablet 3    cholecalciferol, vitamin D3, (VITAMIN D3) 125 mcg (5,000 unit) Tab Take 5,000 Units by mouth once daily.      coenzyme Q10 100 mg capsule Take 100 mg by mouth every morning.      diltiaZEM (CARDIZEM CD) 120 MG Cp24 Take 1 capsule (120 mg total) by mouth once daily. 90 capsule 1    ergocalciferol (VITAMIN D2) 50,000 unit Cap Take 1 capsule (50,000 Units total) by mouth every 7 days. 12 capsule 1    ferrous sulfate 325 (65 FE) MG EC tablet Take 325 mg by mouth once daily.      fish oil-omega-3 fatty acids 300-1,000 mg capsule Take 2 capsules by mouth every morning.      flaxseed oiL 1,000 mg Cap Take 1 capsule by mouth once daily.      fluticasone-salmeterol diskus inhaler 250-50 mcg Inhale 1 puff into the lungs 2 (two) times daily. 3 each 1    gabapentin (NEURONTIN) 100 MG capsule Take 1 capsule (100 mg total) by mouth every evening. 90 capsule 1    ipratropium-albuteroL (COMBIVENT RESPIMAT)  mcg/actuation inhaler Inhale 2 puffs into the lungs every 4 (four) hours as needed for Shortness of Breath. Rescue 12 g 3    isosorbide mononitrate (IMDUR) 120 MG 24 hr tablet Take 1 tablet (120 mg total) by mouth once daily. 90 tablet 1    metoprolol succinate (TOPROL-XL) 50 MG 24 hr tablet Take 1 tablet (50 mg total) by mouth once daily. 90  tablet 1    nitroGLYCERIN 0.4 MG/DOSE TL SPRY (NITROLINGUAL) 400 mcg/spray spray Place 1 spray under the tongue every 5 (five) minutes as needed for Chest pain (max of 3 doses). 4.9 g 1    pantoprazole (PROTONIX) 40 MG tablet Take 1 tablet (40 mg total) by mouth once daily. 90 tablet 1    polycarbophil (FIBERCON) 625 mg tablet Take 625 mg by mouth once daily.      ranolazine (RANEXA) 500 MG Tb12 Take 1 tablet (500 mg total) by mouth 2 (two) times daily. 180 tablet 1    torsemide (DEMADEX) 10 MG Tab Take 1 tablet (10 mg total) by mouth once daily. 90 tablet 1    triamcinolone acetonide 0.1% (KENALOG) 0.1 % cream Apply topically 2 (two) times daily. (Patient taking differently: Apply 1 g topically 2 (two) times daily.) 453 g 1    umeclidinium (INCRUSE ELLIPTA) 62.5 mcg/actuation inhalation capsule Inhale 62.5 mcg into the lungs every morning. Controller 30 each 11    vit C/E/Zn/coppr/lutein/zeaxan (PRESERVISION AREDS-2 ORAL) Take 1 tablet by mouth once daily.      vitamins A,C,E-zinc-copper (ICAPS AREDS) 14,320-226-200 unit-mg-unit Cap Take 1 capsule by mouth once daily.      cholestyramine (QUESTRAN) 4 gram packet Take 1 packet (4 g total) by mouth 2 (two) times daily. (Patient not taking: Reported on 1/16/2023) 180 packet 3    colchicine (COLCRYS) 0.6 mg tablet Take 1/2 tablet by mouth every other day (Patient not taking: Reported on 1/16/2023) 15 tablet 11    temazepam (RESTORIL) 15 mg Cap Take 1 capsule (15 mg total) by mouth once daily at 6am. (Patient not taking: Reported on 1/16/2023) 30 capsule 4    traMADoL (ULTRAM) 50 mg tablet Take 1 tablet (50 mg total) by mouth 2 (two) times daily as needed for Pain. (Patient not taking: Reported on 1/16/2023) 30 tablet 0    [DISCONTINUED] benazepriL (LOTENSIN) 40 MG tablet Take 1 tablet (40 mg total) by mouth once daily. 90 tablet 1    [DISCONTINUED] cimetidine (TAGAMET) 300 MG tablet Take 1 tablet (300 mg total) by mouth every 6 (six) hours. Take 1 tablet (300 mg total)  by mouth starting at 6 PM on Sunday April 17, 2022 (the evening before your angiogram procedure). Then take 1 tablet at 12 AM, then take 1 tablet at 6 AM on Monday April 18th. 3 tablet 0    [DISCONTINUED] furosemide (LASIX) 20 MG tablet Take 2 tablets (40 mg total) by mouth once daily. 45 tablet 1    [DISCONTINUED] lisinopriL 10 MG tablet Take 1 tablet (10 mg total) by mouth once daily. HOLD UNTIL OTHERWISE DIRECTED BY PRIMARY CARE PROVIDER 90 tablet 3    [DISCONTINUED] lovastatin (MEVACOR) 40 MG tablet Take 1 tablet (40 mg total) by mouth every other day. 45 tablet 3    [DISCONTINUED] ticagrelor (BRILINTA) 90 mg tablet Take 1 tablet (90 mg total) by mouth 2 (two) times a day. 180 tablet 1       Scheduled meds:   sodium chloride 0.9%   Intravenous Once    albuterol-ipratropium  3 mL Nebulization Q6H WAKE    aspirin  81 mg Oral QAM    heparin (porcine)  5,000 Units Subcutaneous Q8H    mupirocin   Nasal BID    pantoprazole  40 mg Oral Before breakfast    piperacillin-tazobactam (ZOSYN) IVPB  3.375 g Intravenous Q12H    sodium zirconium cyclosilicate  10 g Oral Daily       Infusions:   sodium bicarbonate drip 125 mL/hr at 01/16/23 2314       PRN meds:  albuterol, heparin (porcine), melatonin, ondansetron, prochlorperazine, sodium chloride 0.9%, sodium chloride 0.9%    Review of Systems:  Constitutional:  Negative for chills, fever, malaise/fatigue and weight loss.   HENT:  Negative for hearing loss and nosebleeds.    Eyes:  Negative for blurred vision, double vision and photophobia.   Respiratory:  Negative for cough, shortness of breath and wheezing.    Cardiovascular:  Negative for chest pain, palpitations and leg swelling.   Gastrointestinal:  Negative for abdominal pain, constipation, diarrhea, heartburn, nausea and vomiting.   Genitourinary:  Negative for dysuria, frequency and urgency.   Musculoskeletal:  Negative for falls, joint pain and myalgias.   Skin:  Negative for itching and rash.   Neurological:   Negative for dizziness, speech change, focal weakness, loss of consciousness and headaches.   Endo/Heme/Allergies:  Does not bruise/bleed easily.   Psychiatric/Behavioral:  Negative for depression and substance abuse. The patient is not nervous/anxious.      OBJECTIVE:     Vital Signs and IO:  Temp:  [97.9 °F (36.6 °C)-99 °F (37.2 °C)]   Pulse:  []   Resp:  [16-22]   BP: (108-202)/(37-95)   SpO2:  [78 %-100 %]   I/O last 3 completed shifts:  In: 3894.8 [P.O.:75; I.V.:2169.8; Other:500; IV Piggyback:1150]  Out: 2700 [Urine:1700; Other:1000]  Wt Readings from Last 5 Encounters:   01/16/23 84.3 kg (185 lb 13.6 oz)   01/10/23 79.7 kg (175 lb 12.8 oz)   11/03/22 83.5 kg (184 lb)   10/31/22 83.7 kg (184 lb 9.6 oz)   06/30/22 82 kg (180 lb 12.4 oz)     Body mass index is 32.92 kg/m².    Physical Exam  Constitutional:       General: She is not in acute distress.     Appearance: She is well-developed. She is not diaphoretic.   HENT:      Head: Normocephalic and atraumatic.      Mouth/Throat:      Mouth: Mucous membranes are moist.   Eyes:      General: No scleral icterus.     Pupils: Pupils are equal, round, and reactive to light.   Cardiovascular:      Rate and Rhythm: Normal rate and regular rhythm.   Pulmonary:      Effort: Pulmonary effort is normal. No respiratory distress.      Breath sounds: No stridor.   Abdominal:      General: There is no distension.      Palpations: Abdomen is soft.   Musculoskeletal:         General: No deformity. Normal range of motion.      Cervical back: Neck supple.   Skin:     General: Skin is warm and dry.      Findings: No rash present. No erythema.   Neurological:      Mental Status: She is alert and oriented to person, place, and time.      Cranial Nerves: No cranial nerve deficit.   Psychiatric:         Behavior: Behavior normal.     Laboratory:  Recent Labs   Lab 01/16/23  0832 01/16/23  1425 01/17/23  0504    138 138   K 7.0* 5.3* 3.9    100 97   CO2 20* 23 30*   BUN  121* 118* 52*   CREATININE 5.8* 5.6* 3.6*   GLU 74 125* 102         Recent Labs   Lab 01/16/23  0025 01/16/23  0750 01/16/23  0832 01/16/23  1425 01/17/23  0504   CALCIUM 7.4* 7.0*  7.0* 7.3* 7.4* 7.5*   ALBUMIN 3.1* 2.6* 2.5*  --  2.2*   MG 1.9 1.7  --   --   --                No results for input(s): POCTGLUCOSE in the last 168 hours.    Recent Labs   Lab 10/26/22  1415 11/02/22  1305 01/06/23  1408   Hemoglobin A1C 5.1 4.9 4.8         Recent Labs   Lab 01/16/23  0025 01/16/23  0750 01/17/23  0504   WBC 34.16* 39.12* 16.60*   HGB 12.2 11.7* 10.1*   HCT 39.8 38.5 31.9*    164 109*   MCV 96 97 93   MCHC 30.7* 30.4* 31.7*   MONO 0.0* 1.0*  --          Recent Labs   Lab 01/16/23  0750 01/16/23  0832 01/17/23  0504   BILITOT 1.0 1.0 0.5   PROT 5.1* 5.2* 4.9*   ALBUMIN 2.6* 2.5* 2.2*   ALKPHOS 59 55 45*   ALT 12 12 12   AST 25 28 17         Recent Labs   Lab 09/24/21  1308 02/24/22  0710 01/09/23  1328 01/16/23  0401   Color, UA Yellow Yellow Yellow Yellow   Appearance, UA Clear Hazy A Hazy A Clear   pH, UA 7.0 5.0 5.0 5.0   Specific Ada, UA 1.015 1.010 1.010 1.015   Protein, UA Negative Negative 1+ A 1+ A   Glucose, UA Negative Negative Negative Negative   Ketones, UA Negative Negative Negative Negative   Urobilinogen, UA Negative  --  Negative Negative   Bilirubin (UA) Negative Negative Negative Negative   Occult Blood UA Negative Negative Trace A Negative   Nitrite, UA Negative Negative Negative Negative   RBC, UA  --  2 4 2   WBC, UA  --  12 H 4 12 H   Bacteria  --  Occasional Occasional Rare   Hyaline Casts, UA  --  4 A 5 A 5 A         Recent Labs   Lab 01/16/23  0021   Sample VENOUS         Microbiology Results (last 7 days)       Procedure Component Value Units Date/Time    Blood culture [[id number]] Collected: 01/16/23 0235    Order Status: Completed Specimen: Blood from Peripheral, Upper Arm, Left Updated: 01/17/23 0432     Blood Culture, Routine No Growth to date      No Growth to date    Blood  culture [[id number]] Collected: 01/16/23 0407    Order Status: Completed Specimen: Blood from Peripheral, Antecubital, Left Updated: 01/17/23 0432     Blood Culture, Routine No Growth to date      No Growth to date    Urine culture [[id number]] Collected: 01/16/23 0401    Order Status: No result Specimen: Urine Updated: 01/16/23 0433            ASSESSMENT/PLAN:     ISSA, UA looks blind ? hemodynamic 2/2 GIB, hypovolemia 2/2 N/V 2/2 choledocholithiasis, ACEi  Hyperkalemia  Bradycardia 2/2 hyperkalemia?  Acidosis  Hyponatremia  CKD stage 3, baseline sCr around 1.4  HFpEF  DM  No NSAIDs, ACEI/ARB, IV contrast or other nephrotoxins.  Keep MAP > 60, SBP > 100.  Dose meds for GFR < 30 ml/min.  Renal diet - low K, low phos and Lokelma when able to take PO.  If repeat labs are still abnormal, consider insulin+d50, etc.  Change from NS to bicarb gtt.  Place daniel to monitor UO.  Labs q4h until K is controlled.  Control DM.  Emergent HD done 1/16    Anemia of CKD  Hgb and HCT are acceptable. Monitor for now.  Continue oral iron.    MBD / Secondary HPT  Ca, Phos, PTH and vitamin D levels are acceptable.   Continue vitamin D.    HTN  BP seem controlled.   Tolerate asymptomatic HTN up to -160.  Continue home meds, hold diureics and ACEi.    Thank you for allowing us to participate in the care of your patient!   We will follow the patient and provide recommendations as needed.    Patient care time was spent personally by me on the following activities:     Obtaining a history.  Examination of patient.  Providing medical care at the patients bedside.  Developing a treatment plan with patient or surrogate and bedside caregivers.  Ordering and reviewing laboratory studies, radiographic studies, pulse oximetry.  Ordering and performing treatments and interventions.  Evaluation of patient's response to treatment.  Discussions with consultants while on the unit and immediately available to the patient.  Re-evaluation of the  patient's condition.  Documentation in the medical record.     Meri Forde NP      [facility]  [address] [place] [address]  [place] [phone] - tel  [phone] - fax    1/17/2023

## 2023-01-17 NOTE — PROGRESS NOTES
"[facility]  Adult Nutrition   Progress Note (Initial Assessment)     SUMMARY     Recommendations/Interventions:  Continue current renal diet as tolerated.    Goals:   Pt to meet 75% or more of her EEN/EPN.    Nutrition Goal Status: goal met    Communication of RD Recs: reviewed with RN    Dietitian Rounds Brief  LOS:  Pt with adequate PO intake who just got to ICU today due to chest pain. Her LBM was today. Her skin is intact and will continue to follow prn.    SUBJECTIVE:      HPI: 77F with CAD, COPD, DM, HFpEF presents for weakness and vomiting. Patient states she's been in her usual health until the AM of admission when she felt weak and numb, particularly her arms which states are usually the strongest. She also endorsed nausea and vomiting, followed by abdominal pain. Emesis coffee ground in appearance. Denies fevers/chills. In the ED, patient's vitals were pertinent for HR in 40s though patient asymptomatic, received atropine en route with EMS with minimal effect. Labs reveal leukocytosis 34, k 6.6, creat 5.3, baseline around 1. Imaging reveal "Interval development of intrahepatic and extrahepatic pneumobilia with stranding along the common bile duct" as well as "Punctate 2 mm density in the common bile duct as described above suspicious for ductal stone." Kidneys and bladder are not obstructed. Patient started on fluids, antibiotics. Call out was placed to GI. Received temporizing therapy with ca gluconate, lokelma, albuterol. Repeat labs are pending.     Addendum @ 11:12 AM D/w floor nurse on 1100. Patient was apparently intubated and went for a procedure with GI. Repeat K I still very high and it seems she did not respond to any of the temporizing measures administered in ER. Per nurse there was some urine in her daniel prior to departure for procedure. I instructed the nurse to consult anesthesiology for line placement for emergency dialysis and as soon as she is out of the procedure, we will " "have to repeat stat labs and do more temporizing measures until we can do dialysis.     Addendum @ 4:10 PM Seen on HD today, tolerating well. Making some urine.     1/17  Had emergent HD yesterday 2/2 hyperkalemia.  VSS today, K+ at goal.  On bicarb gtt, UOP 1.7L.  Feels better today, no acute HD needs noted.        Diet order:   Current Diet Order: Renal      Evaluation of Received Nutrient/Fluid Intake  Energy Calories Required: meeting needs  Protein Required: meeting needs  Fluid Required: meeting needs  Tolerance: tolerating     % Intake of Estimated Energy Needs: 75 - 100 %  % Meal Intake: 75 - 100 %      Intake/Output Summary (Last 24 hours) at 1/17/2023 1652  Last data filed at 1/17/2023 1100  Gross per 24 hour   Intake 1216.8 ml   Output 3250 ml   Net -2033.2 ml        Anthropometrics  Temp: 98.7 °F (37.1 °C)  Height: 5' 3" (160 cm)  Height (inches): 63 in  Weight Method: Bed Scale  Weight: 84.3 kg (185 lb 13.6 oz)  Weight (lb): 185.85 lb  Ideal Body Weight (IBW), Female: 115 lb  % Ideal Body Weight, Female (lb): 161.61 %  BMI (Calculated): 32.9  BMI Grade: 30 - 34.9- obesity - grade I       Estimated/Assessed Needs  Weight Used For Calorie Calculations: 84 kg (185 lb 3 oz)  Energy Calorie Requirements (kcal): 924-1176 kcals/day  Energy Need Method: Kcal/kg  Protein Requirements: 104-130 g/day  Weight Used For Protein Calculations: 52 kg (114 lb 10.2 oz)     Estimated Fluid Requirement Method: RDA Method  RDA Method (mL): 924       Reason for Assessment  Reason For Assessment: length of stay  Diagnosis: other (see comments) (ISSA (acute kidney injury))  Relevant Medical History: COPD (chronic obstructive pulmonary disease), Obesity, Hypoxemia, Decreased hearing, Diabetes mellitus, Insomnia, Hypertension, Hypercholesterolemia, CAD (coronary artery disease), CHF (congestive heart failure), Osteoarthritis, Colon cancer, Diabetes mellitus, type 2, Colitis  Interdisciplinary Rounds: attended    Nutrition/Diet " History  Spiritual, Cultural Beliefs, Alevism Practices, Values that Affect Care: no  Food Allergies: other (see comments) (strawberries)  Factors Affecting Nutritional Intake: abdominal pain, abdominal distension    Nutrition Risk Screen  Nutrition Risk Screen: no indicators present     MST Score: 2  Have you recently lost weight without trying?: Unsure  Weight loss score: 2  Have you been eating poorly because of a decreased appetite?: No  Appetite score: 0       Weight History:  Wt Readings from Last 5 Encounters:   01/16/23 84.3 kg (185 lb 13.6 oz)   01/17/23 83.9 kg (185 lb)   01/10/23 79.7 kg (175 lb 12.8 oz)   11/03/22 83.5 kg (184 lb)   10/31/22 83.7 kg (184 lb 9.6 oz)        Lab/Procedures/Meds: Pertinent Labs/Meds Reviewed    Medications:Pertinent Medications Reviewed  Scheduled Meds:   sodium chloride 0.9%   Intravenous Once    albuterol-ipratropium  3 mL Nebulization Q6H WAKE    aspirin  81 mg Oral QAM    heparin (porcine)  5,000 Units Subcutaneous Q8H    metoprolol tartrate  50 mg Oral TID    mupirocin   Nasal BID    nitroGLYCERIN 2% TD oint  1 inch Topical (Top) Q6H    pantoprazole  40 mg Oral Before breakfast    piperacillin-tazobactam (ZOSYN) IVPB  3.375 g Intravenous Q12H    sodium zirconium cyclosilicate  10 g Oral Daily    ticagrelor  90 mg Oral Q12H     Continuous Infusions:  PRN Meds:.albuterol, heparin (porcine), melatonin, ondansetron, prochlorperazine, sodium chloride 0.9%, sodium chloride 0.9%    Labs: Pertinent Labs Reviewed  Clinical Chemistry:  Recent Labs   Lab 01/16/23  0025 01/16/23  0750 01/16/23  0832 01/17/23  0504   * 134*  134*   < > 138   K 6.6* 6.3*  6.3*   < > 3.9    99  99   < > 97   CO2 19* 18*  18*   < > 30*    75  75   < > 102   * 112*  112*   < > 52*   CREATININE 5.3* 5.6*  5.6*   < > 3.6*   CALCIUM 7.4* 7.0*  7.0*   < > 7.5*   PROT 6.0 5.1*   < > 4.9*   ALBUMIN 3.1* 2.6*   < > 2.2*   BILITOT 1.0 1.0   < > 0.5   ALKPHOS 76 59   < > 45*    AST 21 25   < > 17   ALT 17 12   < > 12   ANIONGAP 15 17*  17*   < > 11   MG 1.9 1.7  --   --    LIPASE 198*  --   --   --     < > = values in this interval not displayed.     CBC:   Recent Labs   Lab 01/17/23  0504   WBC 16.60*   RBC 3.44*   HGB 10.1*   HCT 31.9*   *   MCV 93   MCH 29.4   MCHC 31.7*     Cardiac Profile:  Recent Labs   Lab 01/16/23  0025   *     Thyroid & Parathyroid:  Recent Labs   Lab 01/16/23  0025   TSH 0.390       Monitor and Evaluation  Food and Nutrient Intake: energy intake, food and beverage intake  Food and Nutrient Adminstration: diet order  Knowledge/Beliefs/Attitudes: food and nutrition knowledge/skill, beliefs and attitudes  Physical Activity and Function: nutrition-related ADLs and IADLs, factors affecting access to physical activity  Anthropometric Measurements: weight, weight change, body mass index  Biochemical Data, Medical Tests and Procedures: electrolyte and renal panel, gastrointestinal profile, inflammatory profile, glucose/endocrine profile, lipid profile  Nutrition-Focused Physical Findings: overall appearance     Nutrition Risk  Level of Risk/Frequency of Follow-up: moderate     Nutrition Follow-Up  RD Follow-up?: Yes      Michelle Elias, GARCIA 01/17/2023 4:52 PM

## 2023-01-17 NOTE — HOSPITAL COURSE
Pt got admitted with  Brash syndrome(bradycardia, renal failure, AV juaquin blockade, shock, and hyperkalemia)  Pt had emergent dialysis and ISSA/hyperkalemia got resolved  Found to have Choledocholithiasis and had  biliary sphincterotomy / The biliary tree was swept  Pt was started on iv abx   Pt has h/o Significant coronary disease with blockages in the right coronary and circumflex mid LAD (per recent angiogram;coronary)  She was treated medically for  subendocardial ischemia because she had chest pain on 1/17/2023  Also found to have UTI  Medications changes as recommended by Cardiology/Nephrology Teams  Pts condition got better and was later discharged to home with HH

## 2023-01-17 NOTE — NURSING
Pt called nurses station c/o chest pain and shortness of breath.  Upon entering room, pt was lying in bed comfortably.  Pt stated room was hot and wanted air to be turned down.  She believed that may have been causing the SOB and chest pain.  Dr. Vanessa notified.  No new orders.  About 5 minutes later, pt called again stating chest pain was getting worse.  Dr. Vanessa notified, and ordered a stat EKG.  EKG showed acute MI/ STEMI.  Dr. Vanessa notified.  ICU transfer ordered.  Called nurse in ICU and gave report.  Dr. Burt consulted and visited pt.

## 2023-01-17 NOTE — SUBJECTIVE & OBJECTIVE
Interval History:     Review of Systems   Constitutional:  Positive for fatigue. Negative for activity change and appetite change.   HENT:  Negative for congestion and dental problem.    Eyes:  Negative for discharge and itching.   Respiratory:  Negative for shortness of breath.    Cardiovascular:  Negative for chest pain.   Gastrointestinal:  Negative for abdominal distention and abdominal pain.   Endocrine: Negative for cold intolerance.   Genitourinary:  Negative for difficulty urinating and dysuria.   Musculoskeletal:  Negative for arthralgias and back pain.   Skin:  Negative for color change.   Neurological:  Negative for dizziness and facial asymmetry.   Hematological:  Negative for adenopathy.   Psychiatric/Behavioral:  Negative for agitation and behavioral problems.    Objective:     Vital Signs (Most Recent):  Temp: 98 °F (36.7 °C) (01/16/23 1740)  Pulse: 75 (01/16/23 1740)  Resp: 16 (01/16/23 1740)  BP: (!) 140/60 (01/16/23 1740)  SpO2: 98 % (01/16/23 1740)   Vital Signs (24h Range):  Temp:  [97.6 °F (36.4 °C)-98.2 °F (36.8 °C)] 98 °F (36.7 °C)  Pulse:  [44-80] 75  Resp:  [16-24] 16  SpO2:  [78 %-100 %] 98 %  BP: (107-152)/(37-94) 140/60     Weight: 84.4 kg (186 lb)  Body mass index is 32.95 kg/m².    Intake/Output Summary (Last 24 hours) at 1/16/2023 1906  Last data filed at 1/16/2023 1740  Gross per 24 hour   Intake 3078 ml   Output 1000 ml   Net 2078 ml      Physical Exam  Vitals and nursing note reviewed.   Constitutional:       General: She is not in acute distress.  HENT:      Head: Atraumatic.      Right Ear: External ear normal.      Left Ear: External ear normal.      Nose: Nose normal.      Mouth/Throat:      Mouth: Mucous membranes are moist.   Cardiovascular:      Rate and Rhythm: Normal rate.   Pulmonary:      Effort: Pulmonary effort is normal.   Abdominal:      Palpations: Abdomen is soft.   Musculoskeletal:         General: Normal range of motion.      Cervical back: Normal range of motion.    Skin:     General: Skin is warm.   Neurological:      Mental Status: She is alert and oriented to person, place, and time.   Psychiatric:         Behavior: Behavior normal.       Significant Labs: All pertinent labs within the past 24 hours have been reviewed.  CBC:   Recent Labs   Lab 01/16/23  0025 01/16/23  0750   WBC 34.16* 39.12*   HGB 12.2 11.7*   HCT 39.8 38.5    164     CMP:   Recent Labs   Lab 01/16/23  0025 01/16/23  0750 01/16/23  0832 01/16/23  1425   * 134*  134* 137 138   K 6.6* 6.3*  6.3* 7.0* 5.3*    99  99 100 100   CO2 19* 18*  18* 20* 23    75  75 74 125*   * 112*  112* 121* 118*   CREATININE 5.3* 5.6*  5.6* 5.8* 5.6*   CALCIUM 7.4* 7.0*  7.0* 7.3* 7.4*   PROT 6.0 5.1* 5.2*  --    ALBUMIN 3.1* 2.6* 2.5*  --    BILITOT 1.0 1.0 1.0  --    ALKPHOS 76 59 55  --    AST 21 25 28  --    ALT 17 12 12  --    ANIONGAP 15 17*  17* 17* 15       Significant Imaging: I have reviewed all pertinent imaging results/findings within the past 24 hours.

## 2023-01-17 NOTE — CONSULTS
[facility]  Cardiology  Consult Note    Patient Name: Marisol Kerr  MRN: [id number]  Admission Date: 1/15/2023 she has a history of ASCVD she is a history of ASCVD  Hospital Length of Stay: 1 days  Code Status: Full Code   Attending Provider: Dean Vanessa MD   Consulting Provider: Khadar Burt MD  Primary Care Physician: Khadar Dwyer MD  Principal Problem:ISSA (acute kidney injury)    Patient information was obtained from patient, past medical records, and ER records.     Consults  Subjective:     Chief Complaint:  Chest discomfort     HPI:  Problem 1 patient came into the hospital with abdominal pain was found to be in acute renal failure with urinary tract infection and received dialysis and currently is feeling better today after dialysis and is being treated  For UTI  2. She has a history of ASCVD with cardiac catheterization year last year demonstrates  The RPAV lesion was 100% stenosed.  The RPDA lesion was 100% stenosed.  The Prox Cx to Mid Cx lesion was 80% stenosed.  The Dist Cx lesion was 70% stenosed.  The 1st LPL lesion was 90% stenosed.  The Prox RCA-2 lesion was 70% stenosed.  The Prox RCA-1 lesion was 90% stenosed.  The Mid LAD-2 lesion was 60% stenosed.  The estimated blood loss was <50 mL.  There was three vessel coronary artery disease.  With multiple stents    She was sent to [facility] but they did not do any stenting there due to underlying comorbidities  Her home medicines include metoprolol, Brilinta, benazepril diltiazem and ranolazine  Today she had chest pain with inferolateral ST segment depression compatible subendocardial ischemia but by the time I examined her chest pain intubated  Prior echo demonstrated normal ejection fraction with diastolic dysfunction            Past Medical History:   Diagnosis Date    CAD (coronary artery disease)     Cancer     colon    CHF (congestive heart failure)     Colitis     Colon cancer     COPD (chronic obstructive pulmonary  disease)     Decreased hearing     Diabetes mellitus     Diabetes mellitus, type 2     Hypercholesterolemia     Hypertension     Hypoxemia     Insomnia     Obesity     Osteoarthritis        Past Surgical History:   Procedure Laterality Date    ANGIOGRAM, CORONARY, WITH LEFT HEART CATHETERIZATION N/A 2/10/2022    Procedure: Angiogram, Coronary, with Left Heart Cath;  Surgeon: Cristian Benjamin MD;  Location: [facility] CATH[facility]EP LAB;  Service: Cardiology;  Laterality: N/A;    CARDIAC CATHETERIZATION      CHOLECYSTECTOMY      COLECTOMY      CORONARY ANGIOPLASTY      CORONARY STENT PLACEMENT      EXTERNAL EAR SURGERY      EYE SURGERY      FLEXIBLE SIGMOIDOSCOPY N/A 9/19/2019    Procedure: SIGMOIDOSCOPY, FLEXIBLE;  Surgeon: Biju Kramer III, MD;  Location: [facility] ENDO;  Service: Endoscopy;  Laterality: N/A;    HYSTERECTOMY      partial       Review of patient's allergies indicates:   Allergen Reactions    Iodinated contrast media     Strawberries [strawberry] Hives and Itching       No current facility-administered medications on file prior to encounter.     Current Outpatient Medications on File Prior to Encounter   Medication Sig    albuterol (VENTOLIN HFA) 90 mcg/actuation inhaler Inhale 2 puffs into the lungs every 6 (six) hours as needed for Wheezing or Shortness of Breath. Rescue    albuterol-ipratropium (DUO-NEB) 2.5 mg-0.5 mg/3 mL nebulizer solution Take 3 mLs by nebulization every 4 (four) hours as needed for Wheezing or Shortness of Breath. Rescue    allopurinoL (ZYLOPRIM) 100 MG tablet Take 0.5 tablets (50 mg total) by mouth once daily.    amLODIPine (NORVASC) 10 MG tablet Take 0.5 tablets (5 mg total) by mouth once daily.    aspirin (ECOTRIN) 81 MG EC tablet Take 1 tablet (81 mg total) by mouth every morning.    cholecalciferol, vitamin D3, (VITAMIN D3) 125 mcg (5,000 unit) Tab Take 5,000 Units by mouth once daily.    coenzyme Q10 100 mg capsule Take 100 mg by mouth every morning.    diltiaZEM (CARDIZEM CD)  120 MG Cp24 Take 1 capsule (120 mg total) by mouth once daily.    ergocalciferol (VITAMIN D2) 50,000 unit Cap Take 1 capsule (50,000 Units total) by mouth every 7 days.    ferrous sulfate 325 (65 FE) MG EC tablet Take 325 mg by mouth once daily.    fish oil-omega-3 fatty acids 300-1,000 mg capsule Take 2 capsules by mouth every morning.    flaxseed oiL 1,000 mg Cap Take 1 capsule by mouth once daily.    fluticasone-salmeterol diskus inhaler 250-50 mcg Inhale 1 puff into the lungs 2 (two) times daily.    gabapentin (NEURONTIN) 100 MG capsule Take 1 capsule (100 mg total) by mouth every evening.    ipratropium-albuteroL (COMBIVENT RESPIMAT)  mcg/actuation inhaler Inhale 2 puffs into the lungs every 4 (four) hours as needed for Shortness of Breath. Rescue    isosorbide mononitrate (IMDUR) 120 MG 24 hr tablet Take 1 tablet (120 mg total) by mouth once daily.    metoprolol succinate (TOPROL-XL) 50 MG 24 hr tablet Take 1 tablet (50 mg total) by mouth once daily.    nitroGLYCERIN 0.4 MG/DOSE TL SPRY (NITROLINGUAL) 400 mcg/spray spray Place 1 spray under the tongue every 5 (five) minutes as needed for Chest pain (max of 3 doses).    pantoprazole (PROTONIX) 40 MG tablet Take 1 tablet (40 mg total) by mouth once daily.    polycarbophil (FIBERCON) 625 mg tablet Take 625 mg by mouth once daily.    ranolazine (RANEXA) 500 MG Tb12 Take 1 tablet (500 mg total) by mouth 2 (two) times daily.    torsemide (DEMADEX) 10 MG Tab Take 1 tablet (10 mg total) by mouth once daily.    triamcinolone acetonide 0.1% (KENALOG) 0.1 % cream Apply topically 2 (two) times daily. (Patient taking differently: Apply 1 g topically 2 (two) times daily.)    umeclidinium (INCRUSE ELLIPTA) 62.5 mcg/actuation inhalation capsule Inhale 62.5 mcg into the lungs every morning. Controller    vit C/E/Zn/coppr/lutein/zeaxan (PRESERVISION AREDS-2 ORAL) Take 1 tablet by mouth once daily.    vitamins A,C,E-zinc-copper (ICAPS AREDS) 14,320-226-200 unit-mg-unit Cap  Take 1 capsule by mouth once daily.    cholestyramine (QUESTRAN) 4 gram packet Take 1 packet (4 g total) by mouth 2 (two) times daily. (Patient not taking: Reported on 2023)    colchicine (COLCRYS) 0.6 mg tablet Take 1/2 tablet by mouth every other day (Patient not taking: Reported on 2023)    temazepam (RESTORIL) 15 mg Cap Take 1 capsule (15 mg total) by mouth once daily at 6am. (Patient not taking: Reported on 2023)    traMADoL (ULTRAM) 50 mg tablet Take 1 tablet (50 mg total) by mouth 2 (two) times daily as needed for Pain. (Patient not taking: Reported on 2023)    [DISCONTINUED] benazepriL (LOTENSIN) 40 MG tablet Take 1 tablet (40 mg total) by mouth once daily.    [DISCONTINUED] cimetidine (TAGAMET) 300 MG tablet Take 1 tablet (300 mg total) by mouth every 6 (six) hours. Take 1 tablet (300 mg total) by mouth starting at 6 PM on 2022 (the evening before your angiogram procedure). Then take 1 tablet at 12 AM, then take 1 tablet at 6 AM on .    [DISCONTINUED] furosemide (LASIX) 20 MG tablet Take 2 tablets (40 mg total) by mouth once daily.    [DISCONTINUED] lisinopriL 10 MG tablet Take 1 tablet (10 mg total) by mouth once daily. HOLD UNTIL OTHERWISE DIRECTED BY PRIMARY CARE PROVIDER    [DISCONTINUED] lovastatin (MEVACOR) 40 MG tablet Take 1 tablet (40 mg total) by mouth every other day.    [DISCONTINUED] ticagrelor (BRILINTA) 90 mg tablet Take 1 tablet (90 mg total) by mouth 2 (two) times a day.     Family History       Problem Relation (Age of Onset)    Febrile seizures Mother    Heart failure Father          Tobacco Use    Smoking status: Former     Packs/day: 3.00     Years: 50.00     Pack years: 150.00     Types: Cigarettes     Start date: 3/30/1964     Quit date: 2006     Years since quittin.8    Smokeless tobacco: Never    Tobacco comments:     ex smoker 15 years   Substance and Sexual Activity    Alcohol use: Yes    Drug use: No    Sexual  activity: Never     Review of Systems   Constitutional: Positive for malaise/fatigue and weight gain.   HENT: Negative.     Cardiovascular:  Positive for chest pain, dyspnea on exertion and irregular heartbeat.   Respiratory:  Positive for shortness of breath, sleep disturbances due to breathing and snoring.    Skin:  Positive for color change and dry skin.   Gastrointestinal:  Positive for bloating, abdominal pain and heartburn.   Genitourinary:  Positive for bladder incontinence, dysuria, flank pain and frequency.        Active UTI   Neurological: Negative.    Psychiatric/Behavioral:  The patient is nervous/anxious.    Objective:     Vital Signs (Most Recent):  Temp: 98.7 °F (37.1 °C) (01/17/23 1130)  Pulse: 98 (01/17/23 0856)  Resp: 20 (01/17/23 1002)  BP: (!) 118/58 (01/17/23 0829)  SpO2: (!) 94 % (01/17/23 0829)   Vital Signs (24h Range):  Temp:  [97.9 °F (36.6 °C)-99.3 °F (37.4 °C)] 98.7 °F (37.1 °C)  Pulse:  [] 98  Resp:  [16-20] 20  SpO2:  [78 %-100 %] 94 %  BP: (108-202)/(37-95) 118/58     Weight: 84.3 kg (185 lb 13.6 oz)  Body mass index is 32.92 kg/m².    SpO2: (!) 94 %         Intake/Output Summary (Last 24 hours) at 1/17/2023 1144  Last data filed at 1/17/2023 0602  Gross per 24 hour   Intake 1316.8 ml   Output 2700 ml   Net -1383.2 ml       Lines/Drains/Airways       Central Venous Catheter Line  Duration                  Hemodialysis Catheter 01/16/23 1243 <1 day              Drain  Duration                  Urethral Catheter 01/16/23 0414 16 Fr. 1 day              Peripheral Intravenous Line  Duration                  Peripheral IV - Single Lumen 01/16/23 0014 20 G Anterior;Left;Proximal Upper Arm 1 day                    Physical Exam blood pressure by me sitting was blood pressure by me sitting was 150/50  Head neck no carotid bruit  Lungs few crackles posteriorly  Breast deferred  Cardiac S4  Abdomen is distended   deferred  Extremities mild edema and discoloration  Can not palpate  dorsalis pedis but left femoral was palpable  Neurologic intact    Significant Labs: BMP:   Recent Labs   Lab 01/16/23  0025 01/16/23  0750 01/16/23  0832 01/16/23  1425 01/17/23  0504    75  75 74 125* 102   * 134*  134* 137 138 138   K 6.6* 6.3*  6.3* 7.0* 5.3* 3.9    99  99 100 100 97   CO2 19* 18*  18* 20* 23 30*   * 112*  112* 121* 118* 52*   CREATININE 5.3* 5.6*  5.6* 5.8* 5.6* 3.6*   CALCIUM 7.4* 7.0*  7.0* 7.3* 7.4* 7.5*   MG 1.9 1.7  --   --   --    , CMP   Recent Labs   Lab 01/16/23  0750 01/16/23  0832 01/16/23  1425 01/17/23  0504   *  134* 137 138 138   K 6.3*  6.3* 7.0* 5.3* 3.9   CL 99  99 100 100 97   CO2 18*  18* 20* 23 30*   GLU 75  75 74 125* 102   *  112* 121* 118* 52*   CREATININE 5.6*  5.6* 5.8* 5.6* 3.6*   CALCIUM 7.0*  7.0* 7.3* 7.4* 7.5*   PROT 5.1* 5.2*  --  4.9*   ALBUMIN 2.6* 2.5*  --  2.2*   BILITOT 1.0 1.0  --  0.5   ALKPHOS 59 55  --  45*   AST 25 28  --  17   ALT 12 12  --  12   ANIONGAP 17*  17* 17* 15 11   , and Troponin No results for input(s): TROPONINI in the last 48 hours.    Significant Imaging:  EKG today demonstrated subendocardial ischemia  Assessment and Plan:   1. Acute and chronic kidney failure with urinary tract infection  2. Significant coronary disease with blockages in the right coronary and circumflex mid LAD being treated medically with subendocardial ischemia today -this is a question as to whether she was on Brilinta at home  Currently she has tachycardia and wide pulse pressure-Rx IV beta-blocker and restart metoprolol and topical nitrates rather than IV nitrates  3. Hypercholesterolemia-start statin         Active Diagnoses:    Diagnosis Date Noted POA    PRINCIPAL PROBLEM:  ISSA (acute kidney injury) [N17.9] 09/18/2019 Unknown    UTI (urinary tract infection) [N39.0] 01/17/2023 Unknown    Choledocholithiasis [K80.50] 01/16/2023 Unknown    Bradycardia [R00.1] 01/16/2023 Unknown    Hyperkalemia [E87.5]  02/10/2022 Unknown    Class 2 obesity in adult [E66.9] 05/18/2015 Yes      Problems Resolved During this Admission:       VTE Risk Mitigation (From admission, onward)           Ordered     heparin (porcine) injection 4,000 Units  As needed (PRN)         01/16/23 1751     heparin (porcine) injection 5,000 Units  Every 8 hours         01/16/23 0557     IP VTE HIGH RISK PATIENT  Once         01/16/23 0557     Place sequential compression device  Until discontinued         01/16/23 0557                  I substantially and  personally reviewed old and new ecg's, lab reports,, xray reports  and  other cardiovascular studies including  echo's, stress tests, angiogram reports, holters,and vascular studies .x  In addition I evaluated original cardiac cath  _x__echo  x____cxr __x____ct ____scan on [employer] or [employer] or other viewing platforms and  _x___EKG's .   I reviewed  office and hospital notes Yes _x___ of  referring providers and other providers.  I reviewed personally old hospital and office notes   Time spent evaluating and managing this patient:___60_____min.     Thank you for your consult.     Khadar Burt MD  Cardiology   [facility]

## 2023-01-17 NOTE — ASSESSMENT & PLAN NOTE
Significant coronary disease with blockages in the right coronary and circumflex mid LAD (per recent angiogram;coronary)  Being treated medically with subendocardial ischemia because she had chest pain on 1/17  Cardiology  on board

## 2023-01-17 NOTE — PROGRESS NOTES
"[facility] Medicine  Progress Note    Patient Name: Marisol Kerr  MRN: [id number]  Patient Class: IP- Inpatient   Admission Date: 1/15/2023  Length of Stay: 0 days  Attending Physician: Dean Vanessa MD  Primary Care Provider: Khadar Dwyer MD        Subjective:     Principal Problem:ISSA (acute kidney injury)        HPI:  76 yo F with PMH including CAD, COPD, DM, HFpEF who presents for weakness and vomiting. Patient states she's been in her usual health until this AM when she felt weak and numb particularly her arms which states are usually the strongest. She also endorsed nausea and vomiting followed by abdominal pain after the vomiting. Emesis coffee ground in appearance. Denies fevers/chills. In the ED, patient's vitals were pertinent for HR in 40s though patient asymptomatic, received atropine en route with EMS with minimal effect. Labs reveal leukocytosis 34, k 6.6, and creat 5.3. Imaging reveal "Interval development of intrahepatic and extrahepatic pneumobilia with stranding along the common bile duct" as well as "Punctate 2 mm density in the common bile duct as described above suspicious for ductal stone." Patient started on fluids, antibiotics. Call out was placed to GI. Patient admitted to hospitalist service for further work-up and management      Overview/Hospital Course:  1/16  ERCP was successful  Pt had emergent dialysis because of persistent hyperkalemia  Pt is more stable Now  Erich episodes resolved       Interval History:     Review of Systems   Constitutional:  Positive for fatigue. Negative for activity change and appetite change.   HENT:  Negative for congestion and dental problem.    Eyes:  Negative for discharge and itching.   Respiratory:  Negative for shortness of breath.    Cardiovascular:  Negative for chest pain.   Gastrointestinal:  Negative for abdominal distention and abdominal pain.   Endocrine: Negative for cold intolerance.   Genitourinary:  Negative for " difficulty urinating and dysuria.   Musculoskeletal:  Negative for arthralgias and back pain.   Skin:  Negative for color change.   Neurological:  Negative for dizziness and facial asymmetry.   Hematological:  Negative for adenopathy.   Psychiatric/Behavioral:  Negative for agitation and behavioral problems.    Objective:     Vital Signs (Most Recent):  Temp: 98 °F (36.7 °C) (01/16/23 1740)  Pulse: 75 (01/16/23 1740)  Resp: 16 (01/16/23 1740)  BP: (!) 140/60 (01/16/23 1740)  SpO2: 98 % (01/16/23 1740)   Vital Signs (24h Range):  Temp:  [97.6 °F (36.4 °C)-98.2 °F (36.8 °C)] 98 °F (36.7 °C)  Pulse:  [44-80] 75  Resp:  [16-24] 16  SpO2:  [78 %-100 %] 98 %  BP: (107-152)/(37-94) 140/60     Weight: 84.4 kg (186 lb)  Body mass index is 32.95 kg/m².    Intake/Output Summary (Last 24 hours) at 1/16/2023 1906  Last data filed at 1/16/2023 1740  Gross per 24 hour   Intake 3078 ml   Output 1000 ml   Net 2078 ml      Physical Exam  Vitals and nursing note reviewed.   Constitutional:       General: She is not in acute distress.  HENT:      Head: Atraumatic.      Right Ear: External ear normal.      Left Ear: External ear normal.      Nose: Nose normal.      Mouth/Throat:      Mouth: Mucous membranes are moist.   Cardiovascular:      Rate and Rhythm: Normal rate.   Pulmonary:      Effort: Pulmonary effort is normal.   Abdominal:      Palpations: Abdomen is soft.   Musculoskeletal:         General: Normal range of motion.      Cervical back: Normal range of motion.   Skin:     General: Skin is warm.   Neurological:      Mental Status: She is alert and oriented to person, place, and time.   Psychiatric:         Behavior: Behavior normal.       Significant Labs: All pertinent labs within the past 24 hours have been reviewed.  CBC:   Recent Labs   Lab 01/16/23  0025 01/16/23  0750   WBC 34.16* 39.12*   HGB 12.2 11.7*   HCT 39.8 38.5    164     CMP:   Recent Labs   Lab 01/16/23  0025 01/16/23  0750 01/16/23  0832 01/16/23  1425    * 134*  134* 137 138   K 6.6* 6.3*  6.3* 7.0* 5.3*    99  99 100 100   CO2 19* 18*  18* 20* 23    75  75 74 125*   * 112*  112* 121* 118*   CREATININE 5.3* 5.6*  5.6* 5.8* 5.6*   CALCIUM 7.4* 7.0*  7.0* 7.3* 7.4*   PROT 6.0 5.1* 5.2*  --    ALBUMIN 3.1* 2.6* 2.5*  --    BILITOT 1.0 1.0 1.0  --    ALKPHOS 76 59 55  --    AST 21 25 28  --    ALT 17 12 12  --    ANIONGAP 15 17*  17* 17* 15       Significant Imaging: I have reviewed all pertinent imaging results/findings within the past 24 hours.      Assessment/Plan:      * ISSA (acute kidney injury)  Mostly pre renal   Continue iv fluids  Pt had emergent HD for persistent hyperkalemia      Hyperkalemia  Had emergent HD for persistent hyperkalemia  Pt had Brash syndrome(bradycardia, renal failure, AV juaquin blockade, shock, and hyperkalemia)      Choledocholithiasis  S/p biliary sphincterotomy / The biliary tree was swept  Maintain iv Zosyn      Bradycardia  Resolved       Class 2 obesity in adult  Body mass index is 32.95 kg/m². Morbid obesity complicates all aspects of disease management from diagnostic modalities to treatment.         VTE Risk Mitigation (From admission, onward)         Ordered     heparin (porcine) injection 4,000 Units  As needed (PRN)         01/16/23 1751     heparin (porcine) injection 5,000 Units  Every 8 hours         01/16/23 0557     IP VTE HIGH RISK PATIENT  Once         01/16/23 0557     Place sequential compression device  Until discontinued         01/16/23 0557                Discharge Planning   LUZ:      Code Status: Full Code   Is the patient medically ready for discharge?:     Reason for patient still in hospital (select all that apply):   Discharge Plan A: Home Health                  Dean Vanessa MD  Department of Hospital Medicine   [facility]

## 2023-01-18 LAB
ALBUMIN SERPL BCP-MCNC: 2.2 G/DL (ref 3.5–5.2)
ALP SERPL-CCNC: 49 U/L (ref 55–135)
ALT SERPL W/O P-5'-P-CCNC: 13 U/L (ref 10–44)
ANION GAP SERPL CALC-SCNC: 10 MMOL/L (ref 8–16)
AST SERPL-CCNC: 17 U/L (ref 10–40)
BACTERIA UR CULT: ABNORMAL
BILIRUB SERPL-MCNC: 0.5 MG/DL (ref 0.1–1)
BUN SERPL-MCNC: 51 MG/DL (ref 8–23)
CALCIUM SERPL-MCNC: 8 MG/DL (ref 8.7–10.5)
CHLORIDE SERPL-SCNC: 99 MMOL/L (ref 95–110)
CO2 SERPL-SCNC: 31 MMOL/L (ref 23–29)
CREAT SERPL-MCNC: 3.8 MG/DL (ref 0.5–1.4)
ERYTHROCYTE [DISTWIDTH] IN BLOOD BY AUTOMATED COUNT: 12.8 % (ref 11.5–14.5)
EST. GFR  (NO RACE VARIABLE): 11.8 ML/MIN/1.73 M^2
GLUCOSE SERPL-MCNC: 91 MG/DL (ref 70–110)
HBV CORE AB SERPL QL IA: NEGATIVE
HBV SURFACE AB SER QL: NON REACTIVE
HBV SURFACE AG SERPL QL IA: NEGATIVE
HCT VFR BLD AUTO: 31.2 % (ref 37–48.5)
HGB BLD-MCNC: 9.7 G/DL (ref 12–16)
MCH RBC QN AUTO: 29.3 PG (ref 27–31)
MCHC RBC AUTO-ENTMCNC: 31.1 G/DL (ref 32–36)
MCV RBC AUTO: 94 FL (ref 82–98)
PLATELET # BLD AUTO: 98 K/UL (ref 150–450)
PMV BLD AUTO: 12.2 FL (ref 9.2–12.9)
POTASSIUM SERPL-SCNC: 3.6 MMOL/L (ref 3.5–5.1)
PROT SERPL-MCNC: 5 G/DL (ref 6–8.4)
RBC # BLD AUTO: 3.31 M/UL (ref 4–5.4)
SODIUM SERPL-SCNC: 140 MMOL/L (ref 136–145)
TROPONIN I SERPL HS-MCNC: 358.6 PG/ML (ref 0–14.9)
WBC # BLD AUTO: 12.71 K/UL (ref 3.9–12.7)

## 2023-01-18 PROCEDURE — 84484 ASSAY OF TROPONIN QUANT: CPT | Performed by: INTERNAL MEDICINE

## 2023-01-18 PROCEDURE — 25000003 PHARM REV CODE 250

## 2023-01-18 PROCEDURE — 94640 AIRWAY INHALATION TREATMENT: CPT

## 2023-01-18 PROCEDURE — 25000003 PHARM REV CODE 250: Performed by: SPECIALIST

## 2023-01-18 PROCEDURE — 94799 UNLISTED PULMONARY SVC/PX: CPT

## 2023-01-18 PROCEDURE — 25000003 PHARM REV CODE 250: Performed by: INTERNAL MEDICINE

## 2023-01-18 PROCEDURE — 99900031 HC PATIENT EDUCATION (STAT)

## 2023-01-18 PROCEDURE — 80053 COMPREHEN METABOLIC PANEL: CPT | Performed by: INTERNAL MEDICINE

## 2023-01-18 PROCEDURE — 85027 COMPLETE CBC AUTOMATED: CPT | Performed by: INTERNAL MEDICINE

## 2023-01-18 PROCEDURE — 27000221 HC OXYGEN, UP TO 24 HOURS

## 2023-01-18 PROCEDURE — 63600175 PHARM REV CODE 636 W HCPCS: Performed by: STUDENT IN AN ORGANIZED HEALTH CARE EDUCATION/TRAINING PROGRAM

## 2023-01-18 PROCEDURE — 99900035 HC TECH TIME PER 15 MIN (STAT)

## 2023-01-18 PROCEDURE — 25000242 PHARM REV CODE 250 ALT 637 W/ HCPCS: Performed by: STUDENT IN AN ORGANIZED HEALTH CARE EDUCATION/TRAINING PROGRAM

## 2023-01-18 PROCEDURE — 20000000 HC ICU ROOM

## 2023-01-18 PROCEDURE — 99233 PR SUBSEQUENT HOSPITAL CARE,LEVL III: ICD-10-PCS | Mod: ,,, | Performed by: SPECIALIST

## 2023-01-18 PROCEDURE — 99233 SBSQ HOSP IP/OBS HIGH 50: CPT | Mod: ,,, | Performed by: SPECIALIST

## 2023-01-18 PROCEDURE — 94761 N-INVAS EAR/PLS OXIMETRY MLT: CPT

## 2023-01-18 PROCEDURE — 25000003 PHARM REV CODE 250: Performed by: STUDENT IN AN ORGANIZED HEALTH CARE EDUCATION/TRAINING PROGRAM

## 2023-01-18 RX ORDER — LEVOFLOXACIN 250 MG/1
250 TABLET ORAL EVERY OTHER DAY
Status: DISCONTINUED | OUTPATIENT
Start: 2023-01-20 | End: 2023-01-18

## 2023-01-18 RX ORDER — DILTIAZEM HYDROCHLORIDE 120 MG/1
120 CAPSULE, COATED, EXTENDED RELEASE ORAL DAILY
Status: DISCONTINUED | OUTPATIENT
Start: 2023-01-18 | End: 2023-01-19 | Stop reason: HOSPADM

## 2023-01-18 RX ORDER — RANOLAZINE 500 MG/1
500 TABLET, EXTENDED RELEASE ORAL DAILY
Status: DISCONTINUED | OUTPATIENT
Start: 2023-01-18 | End: 2023-01-19 | Stop reason: HOSPADM

## 2023-01-18 RX ORDER — AMLODIPINE BESYLATE 5 MG/1
5 TABLET ORAL DAILY
Status: DISCONTINUED | OUTPATIENT
Start: 2023-01-18 | End: 2023-01-18

## 2023-01-18 RX ORDER — ATORVASTATIN CALCIUM 40 MG/1
40 TABLET, FILM COATED ORAL DAILY
Status: DISCONTINUED | OUTPATIENT
Start: 2023-01-18 | End: 2023-01-19 | Stop reason: HOSPADM

## 2023-01-18 RX ORDER — CHOLESTYRAMINE 4 G/4.8G
1 POWDER, FOR SUSPENSION ORAL 2 TIMES DAILY
Status: DISCONTINUED | OUTPATIENT
Start: 2023-01-18 | End: 2023-01-19 | Stop reason: HOSPADM

## 2023-01-18 RX ORDER — CHLORHEXIDINE GLUCONATE ORAL RINSE 1.2 MG/ML
15 SOLUTION DENTAL 2 TIMES DAILY
Status: DISCONTINUED | OUTPATIENT
Start: 2023-01-18 | End: 2023-01-19 | Stop reason: HOSPADM

## 2023-01-18 RX ORDER — LEVOFLOXACIN 250 MG/1
250 TABLET ORAL ONCE
Status: COMPLETED | OUTPATIENT
Start: 2023-01-18 | End: 2023-01-18

## 2023-01-18 RX ORDER — LEVOFLOXACIN 250 MG/1
250 TABLET ORAL DAILY
Status: DISCONTINUED | OUTPATIENT
Start: 2023-01-19 | End: 2023-01-18

## 2023-01-18 RX ORDER — LEVOFLOXACIN 250 MG/1
250 TABLET ORAL EVERY OTHER DAY
Status: DISCONTINUED | OUTPATIENT
Start: 2023-01-20 | End: 2023-01-19 | Stop reason: HOSPADM

## 2023-01-18 RX ADMIN — MUPIROCIN 1 G: 20 OINTMENT TOPICAL at 08:01

## 2023-01-18 RX ADMIN — CHOLESTYRAMINE 4 G: 4 POWDER, FOR SUSPENSION ORAL at 10:01

## 2023-01-18 RX ADMIN — METOPROLOL TARTRATE 50 MG: 50 TABLET, FILM COATED ORAL at 04:01

## 2023-01-18 RX ADMIN — CHOLESTYRAMINE 4 G: 4 POWDER, FOR SUSPENSION ORAL at 09:01

## 2023-01-18 RX ADMIN — ATORVASTATIN CALCIUM 40 MG: 40 TABLET, FILM COATED ORAL at 08:01

## 2023-01-18 RX ADMIN — NITROGLYCERIN 1 INCH: 20 OINTMENT TOPICAL at 05:01

## 2023-01-18 RX ADMIN — CHLORHEXIDINE GLUCONATE 15 ML: 1.2 RINSE ORAL at 08:01

## 2023-01-18 RX ADMIN — TICAGRELOR 90 MG: 90 TABLET ORAL at 09:01

## 2023-01-18 RX ADMIN — ASPIRIN 81 MG: 81 TABLET, COATED ORAL at 07:01

## 2023-01-18 RX ADMIN — MUPIROCIN 1 G: 20 OINTMENT TOPICAL at 09:01

## 2023-01-18 RX ADMIN — DILTIAZEM HYDROCHLORIDE 120 MG: 120 CAPSULE, COATED, EXTENDED RELEASE ORAL at 08:01

## 2023-01-18 RX ADMIN — RANOLAZINE 500 MG: 500 TABLET, EXTENDED RELEASE ORAL at 08:01

## 2023-01-18 RX ADMIN — IPRATROPIUM BROMIDE AND ALBUTEROL SULFATE 3 ML: 2.5; .5 SOLUTION RESPIRATORY (INHALATION) at 07:01

## 2023-01-18 RX ADMIN — TICAGRELOR 90 MG: 90 TABLET ORAL at 08:01

## 2023-01-18 RX ADMIN — PANTOPRAZOLE SODIUM 40 MG: 40 TABLET, DELAYED RELEASE ORAL at 06:01

## 2023-01-18 RX ADMIN — LEVOFLOXACIN 250 MG: 250 TABLET, FILM COATED ORAL at 01:01

## 2023-01-18 RX ADMIN — METOPROLOL TARTRATE 50 MG: 50 TABLET, FILM COATED ORAL at 09:01

## 2023-01-18 RX ADMIN — PIPERACILLIN SODIUM AND TAZOBACTAM SODIUM 3.38 G: 3; .375 INJECTION, POWDER, LYOPHILIZED, FOR SOLUTION INTRAVENOUS at 07:01

## 2023-01-18 RX ADMIN — HEPARIN SODIUM 5000 UNITS: 5000 INJECTION INTRAVENOUS; SUBCUTANEOUS at 01:01

## 2023-01-18 RX ADMIN — Medication 6 MG: at 09:01

## 2023-01-18 RX ADMIN — NITROGLYCERIN 1 INCH: 20 OINTMENT TOPICAL at 06:01

## 2023-01-18 RX ADMIN — IPRATROPIUM BROMIDE AND ALBUTEROL SULFATE 3 ML: 2.5; .5 SOLUTION RESPIRATORY (INHALATION) at 01:01

## 2023-01-18 RX ADMIN — HEPARIN SODIUM 5000 UNITS: 5000 INJECTION INTRAVENOUS; SUBCUTANEOUS at 06:01

## 2023-01-18 RX ADMIN — CHLORHEXIDINE GLUCONATE 15 ML: 1.2 RINSE ORAL at 09:01

## 2023-01-18 RX ADMIN — NITROGLYCERIN 1 INCH: 20 OINTMENT TOPICAL at 12:01

## 2023-01-18 RX ADMIN — HEPARIN SODIUM 5000 UNITS: 5000 INJECTION INTRAVENOUS; SUBCUTANEOUS at 09:01

## 2023-01-18 RX ADMIN — METOPROLOL TARTRATE 50 MG: 50 TABLET, FILM COATED ORAL at 08:01

## 2023-01-18 NOTE — CONSULTS
[facility]  Cardiology  Progress Note    Patient Name: Marisol Kerr  MRN: [id number]  Admission Date: 1/15/2023  Hospital Length of Stay: 2 days  Code Status: Full Code   Attending Physician: eDan Vanessa MD   Primary Care Physician: Khadar Dwyer MD  Expected Discharge Date: 1/19/2023  Principal Problem:ISSA (acute kidney injury)    Subjective:     Hospital Course:  Patient feels well  Heart rates in the 60      Interval History:  She does have underlying coronary disease but is lungs heart rates under control she should do okay  I reviewed the angiogram from a year ago  She has a codominant right coronary system with 70 80% lesions in the circ and tight mid right over a long segment as well as distal disease in the small right posterior descending  She also has some noncritical disease in the LAD      Review of Systems   Constitutional: Positive for weight loss.   HENT: Negative.     Cardiovascular:  Positive for chest pain and dyspnea on exertion.   Respiratory:  Positive for shortness of breath.    Musculoskeletal:  Positive for arthritis and back pain.   Objective:     Vital Signs (Most Recent):  Temp: 99.1 °F (37.3 °C) (01/18/23 1200)  Pulse: 67 (01/18/23 1501)  Resp: (!) 33 (01/18/23 1501)  BP: (!) 145/73 (01/18/23 1501)  SpO2: 100 % (01/18/23 1501)   Vital Signs (24h Range):  Temp:  [98.6 °F (37 °C)-99.5 °F (37.5 °C)] 99.1 °F (37.3 °C)  Pulse:  [64-96] 67  Resp:  [15-36] 33  SpO2:  [71 %-100 %] 100 %  BP: (124-177)/(53-90) 145/73     Weight: 84.3 kg (185 lb 13.6 oz)  Body mass index is 32.92 kg/m².    SpO2: 100 %         Intake/Output Summary (Last 24 hours) at 1/18/2023 1542  Last data filed at 1/18/2023 0701  Gross per 24 hour   Intake 930 ml   Output 1600 ml   Net -670 ml       Lines/Drains/Airways       Central Venous Catheter Line  Duration                  Hemodialysis Catheter 01/16/23 1243 2 days              Peripheral Intravenous Line  Duration                  Peripheral IV - Single  Lumen 01/16/23 0014 20 G Anterior;Left;Proximal Upper Arm 2 days         Peripheral IV - Single Lumen 01/17/23 1600 20 G Anterior;Right Forearm <1 day                    Physical Exam blood pressure is 130/80 pulse is 64  No bruits  Lungs are clear  Cardiac S4  Extremities without phlebitis or edema    Significant Labs: CMP   Recent Labs   Lab 01/17/23  0504 01/18/23  0441    140   K 3.9 3.6   CL 97 99   CO2 30* 31*    91   BUN 52* 51*   CREATININE 3.6* 3.8*   CALCIUM 7.5* 8.0*   PROT 4.9* 5.0*   ALBUMIN 2.2* 2.2*   BILITOT 0.5 0.5   ALKPHOS 45* 49*   AST 17 17   ALT 12 13   ANIONGAP 11 10   , CBC   Recent Labs   Lab 01/17/23  0504 01/18/23  0441   WBC 16.60* 12.71*   HGB 10.1* 9.7*   HCT 31.9* 31.2*   * 98*   , and Troponin No results for input(s): TROPONINI in the last 48 hours.    Significant Imaging:   Assessment and Plan:   Patient is currently stable  She has lesions in the circumflex at probably could be stented safely and the LAD lesion could be addressed  Native right coronary would be covered at the circumflex was successful  She can be discharged tomorrow on metoprolol XL 50 b.i.d. plus her other medicines  It is important to keep her heart rate down below 70  Continue Brilinta aspirin  She should get repeat stress test as an outpatient  Brief HPI:     Active Diagnoses:    Diagnosis Date Noted POA    PRINCIPAL PROBLEM:  ISSA (acute kidney injury) [N17.9] 09/18/2019 Unknown    UTI (urinary tract infection) [N39.0] 01/17/2023 Yes    Choledocholithiasis [K80.50] 01/16/2023 Unknown    Bradycardia [R00.1] 01/16/2023 Unknown    Hyperkalemia [E87.5] 02/10/2022 Unknown    Class 2 obesity in adult [E66.9] 05/18/2015 Yes    Coronary artery disease, multivessel with history of previous PCI [I25.10] 06/03/2013 Yes      Problems Resolved During this Admission:       VTE Risk Mitigation (From admission, onward)           Ordered     heparin (porcine) injection 4,000 Units  As needed (PRN)          01/16/23 1751     heparin (porcine) injection 5,000 Units  Every 8 hours         01/16/23 0557     IP VTE HIGH RISK PATIENT  Once         01/16/23 0557     Place sequential compression device  Until discontinued         01/16/23 0557                I substantially and  personally reviewed old and new ecg's, lab reports,, xray reports  and  other cardiovascular studies including  echo's, stress tests, angiogram reports, holters,and vascular studies .  In addition I evaluated original cardiac cath  x___echo  _x___cxr __x____ct ____scan on [email or web address] or [employer] or other viewing platforms and  ____EKG's .   I reviewed  office and hospital notes Yes _x___ of  referring providers and other providers.  I reviewed personally old hospital and office notes   Time spent evaluating and managing this patient:__50______min.       Khadar Burt MD  Cardiology  [facility]

## 2023-01-18 NOTE — PROGRESS NOTES
"[facility]  Progress Note    Patient Name: Marisol Kerr  MRN: [id number]  Patient Class: IP- Inpatient   Admission Date: 1/15/2023  Length of Stay: 2 days  Attending Physician: Dean Vanessa MD  Primary Care Provider: Khadar Dwyer MD        Subjective:     Principal Problem:ISSA (acute kidney injury)        HPI:  76 yo F with PMH including CAD, COPD, DM, HFpEF who presents for weakness and vomiting. Patient states she's been in her usual health until this AM when she felt weak and numb particularly her arms which states are usually the strongest. She also endorsed nausea and vomiting followed by abdominal pain after the vomiting. Emesis coffee ground in appearance. Denies fevers/chills. In the ED, patient's vitals were pertinent for HR in 40s though patient asymptomatic, received atropine en route with EMS with minimal effect. Labs reveal leukocytosis 34, k 6.6, and creat 5.3. Imaging reveal "Interval development of intrahepatic and extrahepatic pneumobilia with stranding along the common bile duct" as well as "Punctate 2 mm density in the common bile duct as described above suspicious for ductal stone." Patient started on fluids, antibiotics. Call out was placed to GI. Patient admitted to hospitalist service for further work-up and management      Overview/Hospital Course:  1/16  ERCP was successful  Pt had emergent dialysis because of persistent hyperkalemia  Pt is more stable Now  Erich episodes resolved     1/17  Pt today had chest pain  Restarted on beta blockers  Also on NG paste    1/18  Pt back to baseline  Denies chest pain  Transferred out of ICU  Pt hoping to go home tomorrow      Interval History:     Review of Systems   Constitutional:  Positive for fatigue. Negative for activity change and appetite change.   HENT:  Negative for congestion and dental problem.    Eyes:  Negative for discharge and itching.   Respiratory:  Negative for shortness of breath.    Cardiovascular:  " Negative for chest pain.   Gastrointestinal:  Negative for abdominal distention and abdominal pain.   Endocrine: Negative for cold intolerance.   Genitourinary:  Negative for difficulty urinating and dysuria.   Musculoskeletal:  Negative for arthralgias and back pain.   Skin:  Negative for color change.   Neurological:  Negative for dizziness and facial asymmetry.   Hematological:  Negative for adenopathy.   Psychiatric/Behavioral:  Negative for agitation and behavioral problems.    Objective:     Vital Signs (Most Recent):  Temp: 99 °F (37.2 °C) (01/18/23 0740)  Pulse: 69 (01/18/23 1323)  Resp: 15 (01/18/23 1323)  BP: (!) 154/73 (01/18/23 1200)  SpO2: 100 % (01/18/23 1323)   Vital Signs (24h Range):  Temp:  [98.6 °F (37 °C)-99.5 °F (37.5 °C)] 99 °F (37.2 °C)  Pulse:  [64-96] 69  Resp:  [15-34] 15  SpO2:  [71 %-100 %] 100 %  BP: (124-177)/(53-90) 154/73     Weight: 84.3 kg (185 lb 13.6 oz)  Body mass index is 32.92 kg/m².    Intake/Output Summary (Last 24 hours) at 1/18/2023 1346  Last data filed at 1/18/2023 0701  Gross per 24 hour   Intake 930 ml   Output 1600 ml   Net -670 ml      Physical Exam  Vitals and nursing note reviewed.   Constitutional:       General: She is not in acute distress.  HENT:      Head: Atraumatic.      Right Ear: External ear normal.      Left Ear: External ear normal.      Nose: Nose normal.      Mouth/Throat:      Mouth: Mucous membranes are moist.   Cardiovascular:      Rate and Rhythm: Normal rate.   Pulmonary:      Effort: Pulmonary effort is normal.   Musculoskeletal:         General: Normal range of motion.      Cervical back: Normal range of motion.   Skin:     General: Skin is warm.   Neurological:      Mental Status: She is alert and oriented to person, place, and time.   Psychiatric:         Behavior: Behavior normal.       Significant Labs: All pertinent labs within the past 24 hours have been reviewed.  CBC:   Recent Labs   Lab 01/17/23  0504 01/18/23  0441   WBC 16.60* 12.71*    HGB 10.1* 9.7*   HCT 31.9* 31.2*   * 98*     CMP:   Recent Labs   Lab 01/16/23  1425 01/17/23  0504 01/18/23  0441    138 140   K 5.3* 3.9 3.6    97 99   CO2 23 30* 31*   * 102 91   * 52* 51*   CREATININE 5.6* 3.6* 3.8*   CALCIUM 7.4* 7.5* 8.0*   PROT  --  4.9* 5.0*   ALBUMIN  --  2.2* 2.2*   BILITOT  --  0.5 0.5   ALKPHOS  --  45* 49*   AST  --  17 17   ALT  --  12 13   ANIONGAP 15 11 10       Significant Imaging: I have reviewed all pertinent imaging results/findings within the past 24 hours.      Assessment/Plan:      * ISSA (acute kidney injury)  Mostly pre renal   Renal panels improving   Pt had emergent HD for persistent hyperkalemia on 1/17  If stable pt can go home tomorrow AM       Hyperkalemia  Had emergent HD for persistent hyperkalemia  Pt had Brash syndrome(bradycardia, renal failure, AV juaquin blockade, shock, and hyperkalemia)  Improved       Coronary artery disease, multivessel with history of previous PCI  Significant coronary disease with blockages in the right coronary and circumflex mid LAD (per recent angiogram;coronary)  Being treated medically with subendocardial ischemia because she had chest pain on 1/17  Medical management at the moment       Choledocholithiasis  S/p biliary sphincterotomy / The biliary tree was swept  Maintain abx        UTI (urinary tract infection)  Maintain  abx  Klebsiella UTI      Bradycardia  Resolved   Restarted on bet blockers on 1/17      Class 2 obesity in adult  Body mass index is 32.92 kg/m². Morbid obesity complicates all aspects of disease management from diagnostic modalities to treatment.       VTE Risk Mitigation (From admission, onward)         Ordered     heparin (porcine) injection 4,000 Units  As needed (PRN)         01/16/23 1751     heparin (porcine) injection 5,000 Units  Every 8 hours         01/16/23 0557     IP VTE HIGH RISK PATIENT  Once         01/16/23 0557     Place sequential compression device  Until  discontinued         01/16/23 0557                Discharge Planning   LUZ: 1/19/2023     Code Status: Full Code   Is the patient medically ready for discharge?:     Reason for patient still in hospital (select all that apply):   Discharge Plan A: Home Health                  Dean Vanessa MD  Department of Hospital Medicine   [facility]

## 2023-01-18 NOTE — ASSESSMENT & PLAN NOTE
Significant coronary disease with blockages in the right coronary and circumflex mid LAD (per recent angiogram;coronary)  Being treated medically with subendocardial ischemia because she had chest pain on 1/17  Medical management at the moment

## 2023-01-18 NOTE — PROGRESS NOTES
"Nephrology Progress Note        Patient Name: Marisol Kerr  MRN: [id number]    Patient Class: IP- Inpatient   Admission Date: 1/15/2023  Length of Stay: 2 days  Date of Service: 1/18/2023    Attending Physician: Dean Vanessa MD  Primary Care Provider: Khadar Dwyer MD    Reason for Consult: ladan/hyperkalemia    SUBJECTIVE:     HPI: 77F with CAD, COPD, DM, HFpEF presents for weakness and vomiting. Patient states she's been in her usual health until the AM of admission when she felt weak and numb, particularly her arms which states are usually the strongest. She also endorsed nausea and vomiting, followed by abdominal pain. Emesis coffee ground in appearance. Denies fevers/chills. In the ED, patient's vitals were pertinent for HR in 40s though patient asymptomatic, received atropine en route with EMS with minimal effect. Labs reveal leukocytosis 34, k 6.6, creat 5.3, baseline around 1. Imaging reveal "Interval development of intrahepatic and extrahepatic pneumobilia with stranding along the common bile duct" as well as "Punctate 2 mm density in the common bile duct as described above suspicious for ductal stone." Kidneys and bladder are not obstructed. Patient started on fluids, antibiotics. Call out was placed to GI. Received temporizing therapy with ca gluconate, lokelma, albuterol. Repeat labs are pending.    Addendum @ 11:12 AM D/w floor nurse on 1100. Patient was apparently intubated and went for a procedure with GI. Repeat K I still very high and it seems she did not respond to any of the temporizing measures administered in ER. Per nurse there was some urine in her daniel prior to departure for procedure. I instructed the nurse to consult anesthesiology for line placement for emergency dialysis and as soon as she is out of the procedure, we will have to repeat stat labs and do more temporizing measures until we can do dialysis.    Addendum @ 4:10 PM Seen on HD today, tolerating well. Making some urine.    1/17  Had " emergent HD yesterday 2/2 hyperkalemia.  VSS today, K+ at goal.  On bicarb gtt, UOP 1.7L.  Feels better today, no acute HD needs noted.   VSS, good UO, no urgent need for HD, re-evaluate in AM.    Past Medical History:   Diagnosis Date    CAD (coronary artery disease)     Cancer     colon    CHF (congestive heart failure)     Colitis     Colon cancer     COPD (chronic obstructive pulmonary disease)     Decreased hearing     Diabetes mellitus     Diabetes mellitus, type 2     Hypercholesterolemia     Hypertension     Hypoxemia     Insomnia     Obesity     Osteoarthritis      Past Surgical History:   Procedure Laterality Date    ANGIOGRAM, CORONARY, WITH LEFT HEART CATHETERIZATION N/A 2/10/2022    Procedure: Angiogram, Coronary, with Left Heart Cath;  Surgeon: Cristian Benjamin MD;  Location: [facility] CATH/EP LAB;  Service: Cardiology;  Laterality: N/A;    CARDIAC CATHETERIZATION      CHOLECYSTECTOMY      COLECTOMY      CORONARY ANGIOPLASTY      CORONARY STENT PLACEMENT      ERCP N/A 2023    Procedure: ERCP (ENDOSCOPIC RETROGRADE CHOLANGIOPANCREATOGRAPHY);  Surgeon: Biju Kramer III, MD;  Location: [facility] ENDO;  Service: Endoscopy;  Laterality: N/A;    EXTERNAL EAR SURGERY      EYE SURGERY      FLEXIBLE SIGMOIDOSCOPY N/A 2019    Procedure: SIGMOIDOSCOPY, FLEXIBLE;  Surgeon: Biju Kramer III, MD;  Location: [facility] ENDO;  Service: Endoscopy;  Laterality: N/A;    HYSTERECTOMY      partial     Family History   Problem Relation Age of Onset    Febrile seizures Mother     Heart failure Father      Social History     Tobacco Use    Smoking status: Former     Packs/day: 3.00     Years: 50.00     Pack years: 150.00     Types: Cigarettes     Start date: 3/30/1964     Quit date: 2006     Years since quittin.8    Smokeless tobacco: Never    Tobacco comments:     ex smoker 15 years   Substance Use Topics    Alcohol use: Yes    Drug use: No       Review of patient's allergies indicates:   Allergen  Reactions    Iodinated contrast media     Strawberries [strawberry] Hives and Itching       Outpatient meds:  No current facility-administered medications on file prior to encounter.     Current Outpatient Medications on File Prior to Encounter   Medication Sig Dispense Refill    albuterol (VENTOLIN HFA) 90 mcg/actuation inhaler Inhale 2 puffs into the lungs every 6 (six) hours as needed for Wheezing or Shortness of Breath. Rescue 36 g 3    albuterol-ipratropium (DUO-NEB) 2.5 mg-0.5 mg/3 mL nebulizer solution Take 3 mLs by nebulization every 4 (four) hours as needed for Wheezing or Shortness of Breath. Rescue 120 each 6    allopurinoL (ZYLOPRIM) 100 MG tablet Take 0.5 tablets (50 mg total) by mouth once daily. 45 tablet 1    amLODIPine (NORVASC) 10 MG tablet Take 0.5 tablets (5 mg total) by mouth once daily. 15 tablet 11    aspirin (ECOTRIN) 81 MG EC tablet Take 1 tablet (81 mg total) by mouth every morning. 90 tablet 3    cholecalciferol, vitamin D3, (VITAMIN D3) 125 mcg (5,000 unit) Tab Take 5,000 Units by mouth once daily.      coenzyme Q10 100 mg capsule Take 100 mg by mouth every morning.      diltiaZEM (CARDIZEM CD) 120 MG Cp24 Take 1 capsule (120 mg total) by mouth once daily. 90 capsule 1    ergocalciferol (VITAMIN D2) 50,000 unit Cap Take 1 capsule (50,000 Units total) by mouth every 7 days. 12 capsule 1    ferrous sulfate 325 (65 FE) MG EC tablet Take 325 mg by mouth once daily.      fish oil-omega-3 fatty acids 300-1,000 mg capsule Take 2 capsules by mouth every morning.      flaxseed oiL 1,000 mg Cap Take 1 capsule by mouth once daily.      fluticasone-salmeterol diskus inhaler 250-50 mcg Inhale 1 puff into the lungs 2 (two) times daily. 3 each 1    gabapentin (NEURONTIN) 100 MG capsule Take 1 capsule (100 mg total) by mouth every evening. 90 capsule 1    ipratropium-albuteroL (COMBIVENT RESPIMAT)  mcg/actuation inhaler Inhale 2 puffs into the lungs every 4 (four) hours as needed for Shortness of  Breath. Rescue 12 g 3    isosorbide mononitrate (IMDUR) 120 MG 24 hr tablet Take 1 tablet (120 mg total) by mouth once daily. 90 tablet 1    metoprolol succinate (TOPROL-XL) 50 MG 24 hr tablet Take 1 tablet (50 mg total) by mouth once daily. 90 tablet 1    nitroGLYCERIN 0.4 MG/DOSE TL SPRY (NITROLINGUAL) 400 mcg/spray spray Place 1 spray under the tongue every 5 (five) minutes as needed for Chest pain (max of 3 doses). 4.9 g 1    pantoprazole (PROTONIX) 40 MG tablet Take 1 tablet (40 mg total) by mouth once daily. 90 tablet 1    polycarbophil (FIBERCON) 625 mg tablet Take 625 mg by mouth once daily.      ranolazine (RANEXA) 500 MG Tb12 Take 1 tablet (500 mg total) by mouth 2 (two) times daily. 180 tablet 1    torsemide (DEMADEX) 10 MG Tab Take 1 tablet (10 mg total) by mouth once daily. 90 tablet 1    triamcinolone acetonide 0.1% (KENALOG) 0.1 % cream Apply topically 2 (two) times daily. (Patient taking differently: Apply 1 g topically 2 (two) times daily.) 453 g 1    umeclidinium (INCRUSE ELLIPTA) 62.5 mcg/actuation inhalation capsule Inhale 62.5 mcg into the lungs every morning. Controller 30 each 11    vit C/E/Zn/coppr/lutein/zeaxan (PRESERVISION AREDS-2 ORAL) Take 1 tablet by mouth once daily.      vitamins A,C,E-zinc-copper (ICAPS AREDS) 14,320-226-200 unit-mg-unit Cap Take 1 capsule by mouth once daily.      cholestyramine (QUESTRAN) 4 gram packet Take 1 packet (4 g total) by mouth 2 (two) times daily. (Patient not taking: Reported on 1/16/2023) 180 packet 3    colchicine (COLCRYS) 0.6 mg tablet Take 1/2 tablet by mouth every other day (Patient not taking: Reported on 1/16/2023) 15 tablet 11    temazepam (RESTORIL) 15 mg Cap Take 1 capsule (15 mg total) by mouth once daily at 6am. (Patient not taking: Reported on 1/16/2023) 30 capsule 4    traMADoL (ULTRAM) 50 mg tablet Take 1 tablet (50 mg total) by mouth 2 (two) times daily as needed for Pain. (Patient not taking: Reported on 1/16/2023) 30 tablet 0     [DISCONTINUED] benazepriL (LOTENSIN) 40 MG tablet Take 1 tablet (40 mg total) by mouth once daily. 90 tablet 1    [DISCONTINUED] cimetidine (TAGAMET) 300 MG tablet Take 1 tablet (300 mg total) by mouth every 6 (six) hours. Take 1 tablet (300 mg total) by mouth starting at 6 PM on Sunday April 17, 2022 (the evening before your angiogram procedure). Then take 1 tablet at 12 AM, then take 1 tablet at 6 AM on Monday April 18th. 3 tablet 0    [DISCONTINUED] furosemide (LASIX) 20 MG tablet Take 2 tablets (40 mg total) by mouth once daily. 45 tablet 1    [DISCONTINUED] lisinopriL 10 MG tablet Take 1 tablet (10 mg total) by mouth once daily. HOLD UNTIL OTHERWISE DIRECTED BY PRIMARY CARE PROVIDER 90 tablet 3    [DISCONTINUED] lovastatin (MEVACOR) 40 MG tablet Take 1 tablet (40 mg total) by mouth every other day. 45 tablet 3    [DISCONTINUED] ticagrelor (BRILINTA) 90 mg tablet Take 1 tablet (90 mg total) by mouth 2 (two) times a day. 180 tablet 1       Scheduled meds:   sodium chloride 0.9%   Intravenous Once    albuterol-ipratropium  3 mL Nebulization Q6H WAKE    aspirin  81 mg Oral QAM    atorvastatin  40 mg Oral Daily    chlorhexidine  15 mL Mouth/Throat BID    diltiaZEM  120 mg Oral Daily    heparin (porcine)  5,000 Units Subcutaneous Q8H    metoprolol tartrate  50 mg Oral TID    mupirocin   Nasal BID    nitroGLYCERIN 2% TD oint  1 inch Topical (Top) Q6H    pantoprazole  40 mg Oral Before breakfast    piperacillin-tazobactam (ZOSYN) IVPB  3.375 g Intravenous Q12H    ranolazine  500 mg Oral Daily    sodium zirconium cyclosilicate  10 g Oral Daily    ticagrelor  90 mg Oral Q12H       Infusions:        PRN meds:  albuterol, heparin (porcine), melatonin, ondansetron, prochlorperazine, sodium chloride 0.9%, sodium chloride 0.9%    Review of Systems:  Constitutional:  Negative for chills, fever, malaise/fatigue and weight loss.   HENT:  Negative for hearing loss and nosebleeds.    Eyes:  Negative for blurred vision, double  vision and photophobia.   Respiratory:  Negative for cough, shortness of breath and wheezing.    Cardiovascular:  Negative for chest pain, palpitations and leg swelling.   Gastrointestinal:  Negative for abdominal pain, constipation, diarrhea, heartburn, nausea and vomiting.   Genitourinary:  Negative for dysuria, frequency and urgency.   Musculoskeletal:  Negative for falls, joint pain and myalgias.   Skin:  Negative for itching and rash.   Neurological:  Negative for dizziness, speech change, focal weakness, loss of consciousness and headaches.   Endo/Heme/Allergies:  Does not bruise/bleed easily.   Psychiatric/Behavioral:  Negative for depression and substance abuse. The patient is not nervous/anxious.      OBJECTIVE:     Vital Signs and IO:  Temp:  [98.6 °F (37 °C)-99.5 °F (37.5 °C)]   Pulse:  []   Resp:  [15-34]   BP: (124-177)/(56-90)   SpO2:  [96 %-100 %]   I/O last 3 completed shifts:  In: 840 [P.O.:690; IV Piggyback:150]  Out: 3850 [Urine:3850]  Wt Readings from Last 5 Encounters:   01/16/23 84.3 kg (185 lb 13.6 oz)   01/17/23 83.9 kg (185 lb)   01/10/23 79.7 kg (175 lb 12.8 oz)   11/03/22 83.5 kg (184 lb)   10/31/22 83.7 kg (184 lb 9.6 oz)     Body mass index is 32.92 kg/m².    Physical Exam  Constitutional:       General: She is not in acute distress.     Appearance: She is well-developed. She is not diaphoretic.   HENT:      Head: Normocephalic and atraumatic.      Mouth/Throat:      Mouth: Mucous membranes are moist.   Eyes:      General: No scleral icterus.     Pupils: Pupils are equal, round, and reactive to light.   Cardiovascular:      Rate and Rhythm: Normal rate and regular rhythm.   Pulmonary:      Effort: Pulmonary effort is normal. No respiratory distress.      Breath sounds: No stridor.   Abdominal:      General: There is no distension.      Palpations: Abdomen is soft.   Musculoskeletal:         General: No deformity. Normal range of motion.      Cervical back: Neck supple.   Skin:      General: Skin is warm and dry.      Findings: No rash present. No erythema.   Neurological:      Mental Status: She is alert and oriented to person, place, and time.      Cranial Nerves: No cranial nerve deficit.   Psychiatric:         Behavior: Behavior normal.     Laboratory:  Recent Labs   Lab 01/16/23  1425 01/17/23  0504 01/18/23  0441    138 140   K 5.3* 3.9 3.6    97 99   CO2 23 30* 31*   * 52* 51*   CREATININE 5.6* 3.6* 3.8*   * 102 91         Recent Labs   Lab 01/16/23  0025 01/16/23  0750 01/16/23  0832 01/16/23  1425 01/17/23  0504 01/18/23  0441   CALCIUM 7.4* 7.0*  7.0* 7.3* 7.4* 7.5* 8.0*   ALBUMIN 3.1* 2.6* 2.5*  --  2.2* 2.2*   MG 1.9 1.7  --   --   --   --                No results for input(s): POCTGLUCOSE in the last 168 hours.    Recent Labs   Lab 10/26/22  1415 11/02/22  1305 01/06/23  1408   Hemoglobin A1C 5.1 4.9 4.8         Recent Labs   Lab 01/16/23  0025 01/16/23  0750 01/17/23  0504 01/18/23  0441   WBC 34.16* 39.12* 16.60* 12.71*   HGB 12.2 11.7* 10.1* 9.7*   HCT 39.8 38.5 31.9* 31.2*    164 109* 98*   MCV 96 97 93 94   MCHC 30.7* 30.4* 31.7* 31.1*   MONO 0.0* 1.0*  --   --          Recent Labs   Lab 01/16/23  0832 01/17/23  0504 01/18/23  0441   BILITOT 1.0 0.5 0.5   PROT 5.2* 4.9* 5.0*   ALBUMIN 2.5* 2.2* 2.2*   ALKPHOS 55 45* 49*   ALT 12 12 13   AST 28 17 17         Recent Labs   Lab 09/24/21  1308 02/24/22  0710 01/09/23  1328 01/16/23  0401   Color, UA Yellow Yellow Yellow Yellow   Appearance, UA Clear Hazy A Hazy A Clear   pH, UA 7.0 5.0 5.0 5.0   Specific [place], UA 1.015 1.010 1.010 1.015   Protein, UA Negative Negative 1+ A 1+ A   Glucose, UA Negative Negative Negative Negative   Ketones, UA Negative Negative Negative Negative   Urobilinogen, UA Negative  --  Negative Negative   Bilirubin (UA) Negative Negative Negative Negative   Occult Blood UA Negative Negative Trace A Negative   Nitrite, UA Negative Negative Negative Negative   RBC, UA  --   2 4 2   WBC, UA  --  12 H 4 12 H   Bacteria  --  Occasional Occasional Rare   Hyaline Casts, UA  --  4 A 5 A 5 A         Recent Labs   Lab 01/16/23  0021   Sample VENOUS         Microbiology Results (last 7 days)       Procedure Component Value Units Date/Time    Urine culture [[id number]]  (Abnormal)  (Susceptibility) Collected: 01/16/23 0401    Order Status: Completed Specimen: Urine Updated: 01/18/23 0750     Urine Culture, Routine KLEBSIELLA PNEUMONIAE  >100,000 cfu/ml      Narrative:      Specimen Source->Urine    Blood culture [[id number]] Collected: 01/16/23 0407    Order Status: Completed Specimen: Blood from Peripheral, Antecubital, Left Updated: 01/18/23 0432     Blood Culture, Routine No Growth to date      No Growth to date      No Growth to date    Blood culture [[id number]] Collected: 01/16/23 0235    Order Status: Completed Specimen: Blood from Peripheral, Upper Arm, Left Updated: 01/18/23 0432     Blood Culture, Routine No Growth to date      No Growth to date      No Growth to date            ASSESSMENT/PLAN:     ISSA, UA looks blind ? hemodynamic 2/2 GIB, hypovolemia 2/2 N/V 2/2 choledocholithiasis, ACEi  Acidosis  Hyponatremia  CKD stage 3, baseline sCr around 1.4  HFpEF  DM  No NSAIDs, ACEI/ARB, IV contrast or other nephrotoxins.  Keep MAP > 60, SBP > 100.  Dose meds for GFR < 30 ml/min.  Renal diet - low K, low phos..  Control DM.  Emergent HD done 1/16, PRN for now.    Anemia of CKD  Hgb and HCT are acceptable. Monitor for now.  Continue oral iron.    MBD / Secondary HPT  Ca, Phos, PTH and vitamin D levels are acceptable.   Continue vitamin D.    HTN  BP seem controlled.   Tolerate asymptomatic HTN up to -160.  Continue home meds, hold diureics and ACEi.    Thank you for allowing us to participate in the care of your patient!   We will follow the patient and provide recommendations as needed.    Patient care time was spent personally by me on the following activities:     Obtaining a  history.  Examination of patient.  Providing medical care at the patients bedside.  Developing a treatment plan with patient or surrogate and bedside caregivers.  Ordering and reviewing laboratory studies, radiographic studies, pulse oximetry.  Ordering and performing treatments and interventions.  Evaluation of patient's response to treatment.  Discussions with consultants while on the unit and immediately available to the patient.  Re-evaluation of the patient's condition.  Documentation in the medical record.     Jeremías Lopez MD      [facility]  [address] [place] [id number]    [phone] - tel  [phone] - fax    1/18/2023

## 2023-01-18 NOTE — RESPIRATORY THERAPY
01/17/23 2012   Patient Assessment/Suction   Level of Consciousness (AVPU) alert   Respiratory Effort Normal;Unlabored   Expansion/Accessory Muscles/Retractions no use of accessory muscles   All Lung Fields Breath Sounds equal bilaterally;diminished;coarse   Rhythm/Pattern, Respiratory unlabored   Cough Frequency frequent   Cough Type fair;loose;nonproductive   PRE-TX-O2   Device (Oxygen Therapy) nasal cannula   Flow (L/min) 2   SpO2 98 %   Pulse Oximetry Type Continuous   $ Pulse Oximetry - Multiple Charge Pulse Oximetry - Multiple   Pulse 91   Resp 18   Aerosol Therapy   $ Aerosol Therapy Charges Aerosol Treatment   Daily Review of Necessity (SVN) completed   Respiratory Treatment Status (SVN) given   Treatment Route (SVN) mask   Patient Position (SVN) semi-Solorzano's   Post Treatment Assessment (SVN) breath sounds unchanged   Signs of Intolerance (SVN) none   Breath Sounds Post-Respiratory Treatment   Post-treatment Heart Rate (beats/min) 88   Post-treatment Resp Rate (breaths/min) 18   Education   $ Education Bronchodilator;15 min   Respiratory Evaluation   $ Care Plan Tech Time 15 min   $ Eval/Re-eval Charges Evaluation   Evaluation For Transfer

## 2023-01-18 NOTE — ASSESSMENT & PLAN NOTE
Body mass index is 32.92 kg/m². Morbid obesity complicates all aspects of disease management from diagnostic modalities to treatment.

## 2023-01-18 NOTE — ASSESSMENT & PLAN NOTE
Had emergent HD for persistent hyperkalemia  Pt had Brash syndrome(bradycardia, renal failure, AV juaquin blockade, shock, and hyperkalemia)  Improved

## 2023-01-18 NOTE — SUBJECTIVE & OBJECTIVE
Interval History:     Review of Systems   Constitutional:  Positive for fatigue. Negative for activity change and appetite change.   HENT:  Negative for congestion and dental problem.    Eyes:  Negative for discharge and itching.   Respiratory:  Negative for shortness of breath.    Cardiovascular:  Negative for chest pain.   Gastrointestinal:  Negative for abdominal distention and abdominal pain.   Endocrine: Negative for cold intolerance.   Genitourinary:  Negative for difficulty urinating and dysuria.   Musculoskeletal:  Negative for arthralgias and back pain.   Skin:  Negative for color change.   Neurological:  Negative for dizziness and facial asymmetry.   Hematological:  Negative for adenopathy.   Psychiatric/Behavioral:  Negative for agitation and behavioral problems.    Objective:     Vital Signs (Most Recent):  Temp: 99 °F (37.2 °C) (01/18/23 0740)  Pulse: 69 (01/18/23 1323)  Resp: 15 (01/18/23 1323)  BP: (!) 154/73 (01/18/23 1200)  SpO2: 100 % (01/18/23 1323)   Vital Signs (24h Range):  Temp:  [98.6 °F (37 °C)-99.5 °F (37.5 °C)] 99 °F (37.2 °C)  Pulse:  [64-96] 69  Resp:  [15-34] 15  SpO2:  [71 %-100 %] 100 %  BP: (124-177)/(53-90) 154/73     Weight: 84.3 kg (185 lb 13.6 oz)  Body mass index is 32.92 kg/m².    Intake/Output Summary (Last 24 hours) at 1/18/2023 1346  Last data filed at 1/18/2023 0701  Gross per 24 hour   Intake 930 ml   Output 1600 ml   Net -670 ml      Physical Exam  Vitals and nursing note reviewed.   Constitutional:       General: She is not in acute distress.  HENT:      Head: Atraumatic.      Right Ear: External ear normal.      Left Ear: External ear normal.      Nose: Nose normal.      Mouth/Throat:      Mouth: Mucous membranes are moist.   Cardiovascular:      Rate and Rhythm: Normal rate.   Pulmonary:      Effort: Pulmonary effort is normal.   Musculoskeletal:         General: Normal range of motion.      Cervical back: Normal range of motion.   Skin:     General: Skin is warm.    Neurological:      Mental Status: She is alert and oriented to person, place, and time.   Psychiatric:         Behavior: Behavior normal.       Significant Labs: All pertinent labs within the past 24 hours have been reviewed.  CBC:   Recent Labs   Lab 01/17/23  0504 01/18/23  0441   WBC 16.60* 12.71*   HGB 10.1* 9.7*   HCT 31.9* 31.2*   * 98*     CMP:   Recent Labs   Lab 01/16/23  1425 01/17/23  0504 01/18/23  0441    138 140   K 5.3* 3.9 3.6    97 99   CO2 23 30* 31*   * 102 91   * 52* 51*   CREATININE 5.6* 3.6* 3.8*   CALCIUM 7.4* 7.5* 8.0*   PROT  --  4.9* 5.0*   ALBUMIN  --  2.2* 2.2*   BILITOT  --  0.5 0.5   ALKPHOS  --  45* 49*   AST  --  17 17   ALT  --  12 13   ANIONGAP 15 11 10       Significant Imaging: I have reviewed all pertinent imaging results/findings within the past 24 hours.

## 2023-01-19 ENCOUNTER — TELEPHONE (OUTPATIENT)
Dept: PULMONOLOGY | Facility: CLINIC | Age: 78
End: 2023-01-19

## 2023-01-19 VITALS
HEART RATE: 70 BPM | RESPIRATION RATE: 22 BRPM | HEIGHT: 63 IN | BODY MASS INDEX: 32.93 KG/M2 | WEIGHT: 185.88 LBS | SYSTOLIC BLOOD PRESSURE: 142 MMHG | DIASTOLIC BLOOD PRESSURE: 82 MMHG | TEMPERATURE: 98 F | OXYGEN SATURATION: 100 %

## 2023-01-19 DIAGNOSIS — J44.9 CHRONIC OBSTRUCTIVE PULMONARY DISEASE, UNSPECIFIED COPD TYPE: Primary | ICD-10-CM

## 2023-01-19 PROBLEM — E87.5 HYPERKALEMIA: Status: RESOLVED | Noted: 2022-02-10 | Resolved: 2023-01-19

## 2023-01-19 PROBLEM — K80.50 CHOLEDOCHOLITHIASIS: Status: RESOLVED | Noted: 2023-01-16 | Resolved: 2023-01-19

## 2023-01-19 PROBLEM — R00.1 BRADYCARDIA: Status: RESOLVED | Noted: 2023-01-16 | Resolved: 2023-01-19

## 2023-01-19 PROBLEM — N17.9 AKI (ACUTE KIDNEY INJURY): Status: RESOLVED | Noted: 2019-09-18 | Resolved: 2023-01-19

## 2023-01-19 PROBLEM — N39.0 UTI (URINARY TRACT INFECTION): Status: RESOLVED | Noted: 2023-01-17 | Resolved: 2023-01-19

## 2023-01-19 LAB
ALBUMIN SERPL BCP-MCNC: 2.4 G/DL (ref 3.5–5.2)
ALP SERPL-CCNC: 60 U/L (ref 55–135)
ALT SERPL W/O P-5'-P-CCNC: 12 U/L (ref 10–44)
ANION GAP SERPL CALC-SCNC: 7 MMOL/L (ref 8–16)
AST SERPL-CCNC: 16 U/L (ref 10–40)
BILIRUB SERPL-MCNC: 0.8 MG/DL (ref 0.1–1)
BUN SERPL-MCNC: 54 MG/DL (ref 8–23)
CALCIUM SERPL-MCNC: 8.1 MG/DL (ref 8.7–10.5)
CHLORIDE SERPL-SCNC: 103 MMOL/L (ref 95–110)
CO2 SERPL-SCNC: 30 MMOL/L (ref 23–29)
CREAT SERPL-MCNC: 3.5 MG/DL (ref 0.5–1.4)
ERYTHROCYTE [DISTWIDTH] IN BLOOD BY AUTOMATED COUNT: 12.9 % (ref 11.5–14.5)
EST. GFR  (NO RACE VARIABLE): 13.1 ML/MIN/1.73 M^2
GLUCOSE SERPL-MCNC: 118 MG/DL (ref 70–110)
HCT VFR BLD AUTO: 33.9 % (ref 37–48.5)
HGB BLD-MCNC: 10.5 G/DL (ref 12–16)
MCH RBC QN AUTO: 29.3 PG (ref 27–31)
MCHC RBC AUTO-ENTMCNC: 31 G/DL (ref 32–36)
MCV RBC AUTO: 95 FL (ref 82–98)
PLATELET # BLD AUTO: 141 K/UL (ref 150–450)
PLATELET BLD QL SMEAR: NORMAL
PMV BLD AUTO: 12.3 FL (ref 9.2–12.9)
POTASSIUM SERPL-SCNC: 3.9 MMOL/L (ref 3.5–5.1)
PROT SERPL-MCNC: 5.5 G/DL (ref 6–8.4)
RBC # BLD AUTO: 3.58 M/UL (ref 4–5.4)
SODIUM SERPL-SCNC: 140 MMOL/L (ref 136–145)
WBC # BLD AUTO: 12.57 K/UL (ref 3.9–12.7)

## 2023-01-19 PROCEDURE — 99900031 HC PATIENT EDUCATION (STAT)

## 2023-01-19 PROCEDURE — 25000003 PHARM REV CODE 250: Performed by: INTERNAL MEDICINE

## 2023-01-19 PROCEDURE — 25000003 PHARM REV CODE 250: Performed by: SPECIALIST

## 2023-01-19 PROCEDURE — 63600175 PHARM REV CODE 636 W HCPCS: Performed by: STUDENT IN AN ORGANIZED HEALTH CARE EDUCATION/TRAINING PROGRAM

## 2023-01-19 PROCEDURE — 94799 UNLISTED PULMONARY SVC/PX: CPT

## 2023-01-19 PROCEDURE — 80053 COMPREHEN METABOLIC PANEL: CPT | Performed by: INTERNAL MEDICINE

## 2023-01-19 PROCEDURE — 94640 AIRWAY INHALATION TREATMENT: CPT

## 2023-01-19 PROCEDURE — 25000242 PHARM REV CODE 250 ALT 637 W/ HCPCS: Performed by: STUDENT IN AN ORGANIZED HEALTH CARE EDUCATION/TRAINING PROGRAM

## 2023-01-19 PROCEDURE — 25000003 PHARM REV CODE 250: Performed by: STUDENT IN AN ORGANIZED HEALTH CARE EDUCATION/TRAINING PROGRAM

## 2023-01-19 PROCEDURE — 63600175 PHARM REV CODE 636 W HCPCS: Performed by: INTERNAL MEDICINE

## 2023-01-19 PROCEDURE — 27000221 HC OXYGEN, UP TO 24 HOURS

## 2023-01-19 PROCEDURE — 85027 COMPLETE CBC AUTOMATED: CPT | Performed by: INTERNAL MEDICINE

## 2023-01-19 PROCEDURE — 25000003 PHARM REV CODE 250

## 2023-01-19 PROCEDURE — 94761 N-INVAS EAR/PLS OXIMETRY MLT: CPT

## 2023-01-19 PROCEDURE — 99900035 HC TECH TIME PER 15 MIN (STAT)

## 2023-01-19 RX ORDER — HYDRALAZINE HYDROCHLORIDE 20 MG/ML
10 INJECTION INTRAMUSCULAR; INTRAVENOUS EVERY 6 HOURS PRN
Status: DISCONTINUED | OUTPATIENT
Start: 2023-01-19 | End: 2023-01-19 | Stop reason: HOSPADM

## 2023-01-19 RX ORDER — FUROSEMIDE 40 MG/1
40 TABLET ORAL DAILY
Qty: 30 TABLET | Refills: 0 | Status: ON HOLD | OUTPATIENT
Start: 2023-01-19 | End: 2023-07-23

## 2023-01-19 RX ORDER — HYDRALAZINE HYDROCHLORIDE 100 MG/1
100 TABLET, FILM COATED ORAL EVERY 8 HOURS
Qty: 90 TABLET | Refills: 0 | Status: SHIPPED | OUTPATIENT
Start: 2023-01-19 | End: 2023-01-19 | Stop reason: HOSPADM

## 2023-01-19 RX ORDER — ATORVASTATIN CALCIUM 40 MG/1
40 TABLET, FILM COATED ORAL DAILY
Qty: 30 TABLET | Refills: 0 | Status: SHIPPED | OUTPATIENT
Start: 2023-01-20 | End: 2023-04-24 | Stop reason: SDUPTHER

## 2023-01-19 RX ORDER — HYDRALAZINE HYDROCHLORIDE 50 MG/1
50 TABLET, FILM COATED ORAL 3 TIMES DAILY
Qty: 90 TABLET | Refills: 0 | Status: ON HOLD | OUTPATIENT
Start: 2023-01-19 | End: 2023-02-02 | Stop reason: SDUPTHER

## 2023-01-19 RX ORDER — LEVOFLOXACIN 250 MG/1
250 TABLET ORAL DAILY
Qty: 7 TABLET | Refills: 0 | Status: ON HOLD | OUTPATIENT
Start: 2023-01-19 | End: 2023-02-02 | Stop reason: HOSPADM

## 2023-01-19 RX ORDER — HYDRALAZINE HYDROCHLORIDE 25 MG/1
50 TABLET, FILM COATED ORAL EVERY 8 HOURS
Status: DISCONTINUED | OUTPATIENT
Start: 2023-01-19 | End: 2023-01-19 | Stop reason: HOSPADM

## 2023-01-19 RX ORDER — METOPROLOL TARTRATE 50 MG/1
50 TABLET ORAL 3 TIMES DAILY
Qty: 90 TABLET | Refills: 0 | Status: SHIPPED | OUTPATIENT
Start: 2023-01-19 | End: 2023-04-24 | Stop reason: SDUPTHER

## 2023-01-19 RX ADMIN — DILTIAZEM HYDROCHLORIDE 120 MG: 120 CAPSULE, COATED, EXTENDED RELEASE ORAL at 08:01

## 2023-01-19 RX ADMIN — METOPROLOL TARTRATE 50 MG: 50 TABLET, FILM COATED ORAL at 08:01

## 2023-01-19 RX ADMIN — ASPIRIN 81 MG: 81 TABLET, COATED ORAL at 08:01

## 2023-01-19 RX ADMIN — ATORVASTATIN CALCIUM 40 MG: 40 TABLET, FILM COATED ORAL at 08:01

## 2023-01-19 RX ADMIN — IPRATROPIUM BROMIDE AND ALBUTEROL SULFATE 3 ML: 2.5; .5 SOLUTION RESPIRATORY (INHALATION) at 08:01

## 2023-01-19 RX ADMIN — MUPIROCIN 1 G: 20 OINTMENT TOPICAL at 08:01

## 2023-01-19 RX ADMIN — IPRATROPIUM BROMIDE AND ALBUTEROL SULFATE 3 ML: 2.5; .5 SOLUTION RESPIRATORY (INHALATION) at 01:01

## 2023-01-19 RX ADMIN — HEPARIN SODIUM 5000 UNITS: 5000 INJECTION INTRAVENOUS; SUBCUTANEOUS at 07:01

## 2023-01-19 RX ADMIN — NITROGLYCERIN 1 INCH: 20 OINTMENT TOPICAL at 07:01

## 2023-01-19 RX ADMIN — NITROGLYCERIN 1 INCH: 20 OINTMENT TOPICAL at 12:01

## 2023-01-19 RX ADMIN — CHOLESTYRAMINE 4 G: 4 POWDER, FOR SUSPENSION ORAL at 09:01

## 2023-01-19 RX ADMIN — PANTOPRAZOLE SODIUM 40 MG: 40 TABLET, DELAYED RELEASE ORAL at 07:01

## 2023-01-19 RX ADMIN — TICAGRELOR 90 MG: 90 TABLET ORAL at 08:01

## 2023-01-19 RX ADMIN — HYDRALAZINE HYDROCHLORIDE 10 MG: 20 INJECTION INTRAMUSCULAR; INTRAVENOUS at 03:01

## 2023-01-19 RX ADMIN — CHLORHEXIDINE GLUCONATE 15 ML: 1.2 RINSE ORAL at 08:01

## 2023-01-19 RX ADMIN — HYDRALAZINE HYDROCHLORIDE 50 MG: 25 TABLET ORAL at 08:01

## 2023-01-19 RX ADMIN — ALBUTEROL SULFATE 2.5 MG: 2.5 SOLUTION RESPIRATORY (INHALATION) at 04:01

## 2023-01-19 NOTE — PLAN OF CARE
POC reviewed with patient. Alert and oriented. Oxygen 3 L NC in use. Sinus rhythm on monitor. Voids with purewick. Appetite good. Safety measures in place. Sat up in chair all morning.   1415 - Discharge instructions given. Trialysis catheter and peripheral iv removed. Patient tolerated well. Tele monitor removed. Discharge instructions given. Daughter at bedside with patient home O2. To private auto via wheelchair.    Problem: Infection  Goal: Absence of Infection Signs and Symptoms  Outcome: Adequate for Care Transition     Problem: Adult Inpatient Plan of Care  Goal: Plan of Care Review  Outcome: Adequate for Care Transition  Goal: Patient-Specific Goal (Individualized)  Outcome: Adequate for Care Transition  Goal: Absence of Hospital-Acquired Illness or Injury  Outcome: Adequate for Care Transition  Goal: Optimal Comfort and Wellbeing  Outcome: Adequate for Care Transition  Goal: Readiness for Transition of Care  Outcome: Adequate for Care Transition     Problem: Diabetes Comorbidity  Goal: Blood Glucose Level Within Targeted Range  Outcome: Adequate for Care Transition     Problem: Fluid and Electrolyte Imbalance (Acute Kidney Injury/Impairment)  Goal: Fluid and Electrolyte Balance  Outcome: Adequate for Care Transition     Problem: Oral Intake Inadequate (Acute Kidney Injury/Impairment)  Goal: Optimal Nutrition Intake  Outcome: Adequate for Care Transition     Problem: Renal Function Impairment (Acute Kidney Injury/Impairment)  Goal: Effective Renal Function  Outcome: Adequate for Care Transition     Problem: Hemodynamic Instability (Hemodialysis)  Goal: Effective Tissue Perfusion  Outcome: Adequate for Care Transition     Problem: Infection (Hemodialysis)  Goal: Absence of Infection Signs and Symptoms  Outcome: Adequate for Care Transition     Problem: Fall Injury Risk  Goal: Absence of Fall and Fall-Related Injury  Outcome: Adequate for Care Transition     Problem: Skin Injury Risk Increased  Goal: Skin  Health and Integrity  Outcome: Adequate for Care Transition

## 2023-01-19 NOTE — RESPIRATORY THERAPY
01/1945   Patient Assessment/Suction   Level of Consciousness (AVPU) alert   Respiratory Effort Normal;Unlabored   Expansion/Accessory Muscles/Retractions no use of accessory muscles   All Lung Fields Breath Sounds equal bilaterally;diminished   Rhythm/Pattern, Respiratory unlabored   Cough Frequency infrequent   PRE-TX-O2   Device (Oxygen Therapy) nasal cannula   Flow (L/min) 2   SpO2 100 %   Pulse Oximetry Type Continuous   $ Pulse Oximetry - Multiple Charge Pulse Oximetry - Multiple   Pulse 66   Resp 16   Aerosol Therapy   $ Aerosol Therapy Charges Aerosol Treatment   Daily Review of Necessity (SVN) completed   Respiratory Treatment Status (SVN) given   Treatment Route (SVN) mask   Patient Position (SVN) sitting in chair   Post Treatment Assessment (SVN) breath sounds unchanged   Signs of Intolerance (SVN) none   Breath Sounds Post-Respiratory Treatment   Post-treatment Heart Rate (beats/min) 65   Post-treatment Resp Rate (breaths/min) 16   Education   $ Education Bronchodilator;15 min   Respiratory Evaluation   $ Care Plan Tech Time 15 min   $ Eval/Re-eval Charges Re-evaluation   Evaluation For Re-Eval 3 day

## 2023-01-19 NOTE — PHYSICIAN QUERY
PT Name: Marisol Kerr  MR #: [id number]     DOCUMENTATION CLARIFICATION      CDS/: Quiana Larsen Pe               Contact information: [phone]   This form is a permanent document in the medical record.    Query Date: January 19, 2023    By submitting this query, we are merely seeking further clarification of documentation to reflect the severity of illness of your patient. Please utilize your independent clinical judgment when addressing the question(s) below.     The Medical Record contains the following:   Indicators   Supporting Clinical Findings Location in Medical Record    Chest Pain, Angina 01/17 Patient with chest pain and SOB  Per Chart review    Coronary Artery Disease Per Cardiology consult 01/17 - Pt with history of ASCVD 01/17 Cardiology consult note    EKG EKG Sinus tachycardia with 1st degree A-V block with Premature supraventricular complexes  Posterior infarct , possibly acute  ST and T wave abnormality, consider inferolateral ischemia  ** ** ACUTE MI / STEMI ** **  Abnormal ECG  When compared with ECG of 15-FELI-2023 23:52,  Significant changes have occurred 01/17 EKG     Troponin  Trop 133.4 > 271.7 > 317.6 > 358.6  Per Lab report     Documentation of acute cardiac condition Per Cardiology consult 01/17 - Pt with history of ASCVD - today she had chest pain with inferolateral ST segment depression compatible subendocardial ischemia 01/17 Cardiology Consult note     Medication/Treatment Brilinta and Topical NTG  Per MAR      Provider, please clarify the diagnosis related to the above documentation:  [  X ] NSTEMI   [   ] NSTEMI/Myocardial Infarction Type 2 due to (please specify): __________   [   ] STEMI (please specify site: ___________)   [   ] Acute Myocardial Injury, non-ischemic due to (if known, please specify): ____________________   [   ] CAD with unstable angina without Acute Myocardial Infarction   [   ] Other Cardiac Diagnosis (please specify): _______________       Please document in  your progress notes daily for the duration of treatment until resolved, and include in your discharge summary.  Form No. [id number]

## 2023-01-19 NOTE — PLAN OF CARE
Patient cleared for discharge from case management standpoint.    Follow up appointments scheduled and added to AVS. CM sent emai; to Leandra per protocol to obtain a PCP hospital follow up appoint with [facility] PCP. Patient was not scheduled on spreadsheet sent from [facility] PCPs. Dr Dwyer office will call pt with details. CM contacted Dr Varghese office for an appoint and Dr Varghese office will also contact patient with appoint details. CM called DR Joseph to schedule, but office will only schedule appointments directly with patient. CM informed to call and schedule.     CM sent dc orders to [facility] home [facility] via [facility], [facility] planned for 1/20.    Chart and discharge orders reviewed.  Patient discharged home with [facility] home [facility] no further case management needs.       01/19/23 1322   Final Note   Assessment Type Final Discharge Note   Anticipated Discharge Disposition Home   Hospital Resources/Appts/Education Provided Provided patient/caregiver with written discharge plan information;Appointments scheduled and added to AVS   Post-Acute Status   Post-Acute Authorization [facility]   Home Health Status Set-up Complete/Auth obtained   Discharge Delays None known at this time

## 2023-01-19 NOTE — TELEPHONE ENCOUNTER
----- Message from Wyatt Russell MA sent at 1/19/2023  4:12 PM CST -----  Regarding: new neb machine rx  CAN YOU PLEASE PUT IN AN ORDER FOR A NEBULIZER MACHINE TO BE SENT TO [facility] HERE AT THE [facility]OS[facility].  PTS DAUGHTER IS AWARE.

## 2023-01-19 NOTE — PROGRESS NOTES
"Nephrology Progress Note        Patient Name: Marisol Kerr  MRN: [id number]    Patient Class: IP- Inpatient   Admission Date: 1/15/2023  Length of Stay: 3 days  Date of Service: 1/19/2023    Attending Physician: Dean Vanessa MD  Primary Care Provider: Khadar Dwyer MD    Reason for Consult: ladan/hyperkalemia    SUBJECTIVE:     HPI: 77F with CAD, COPD, DM, HFpEF presents for weakness and vomiting. Patient states she's been in her usual health until the AM of admission when she felt weak and numb, particularly her arms which states are usually the strongest. She also endorsed nausea and vomiting, followed by abdominal pain. Emesis coffee ground in appearance. Denies fevers/chills. In the ED, patient's vitals were pertinent for HR in 40s though patient asymptomatic, received atropine en route with EMS with minimal effect. Labs reveal leukocytosis 34, k 6.6, creat 5.3, baseline around 1. Imaging reveal "Interval development of intrahepatic and extrahepatic pneumobilia with stranding along the common bile duct" as well as "Punctate 2 mm density in the common bile duct as described above suspicious for ductal stone." Kidneys and bladder are not obstructed. Patient started on fluids, antibiotics. Call out was placed to GI. Received temporizing therapy with ca gluconate, lokelma, albuterol. Repeat labs are pending.    Addendum @ 11:12 AM D/w floor nurse on 1100. Patient was apparently intubated and went for a procedure with GI. Repeat K I still very high and it seems she did not respond to any of the temporizing measures administered in ER. Per nurse there was some urine in her daniel prior to departure for procedure. I instructed the nurse to consult anesthesiology for line placement for emergency dialysis and as soon as she is out of the procedure, we will have to repeat stat labs and do more temporizing measures until we can do dialysis.    Addendum @ 4:10 PM Seen on HD today, tolerating well. Making some urine.    1/17  Had " emergent HD yesterday 2/2 hyperkalemia.  VSS today, K+ at goal.  On bicarb gtt, UOP 1.7L.  Feels better today, no acute HD needs noted.  1/18 VSS, good UO, no urgent need for HD, re-evaluate in AM.  1/19 hypertensive, on 2L NC, UOP 1.1L, some faint crackles and edema    Outpatient meds:  No current facility-administered medications on file prior to encounter.     Current Outpatient Medications on File Prior to Encounter   Medication Sig Dispense Refill    albuterol (VENTOLIN HFA) 90 mcg/actuation inhaler Inhale 2 puffs into the lungs every 6 (six) hours as needed for Wheezing or Shortness of Breath. Rescue 36 g 3    albuterol-ipratropium (DUO-NEB) 2.5 mg-0.5 mg/3 mL nebulizer solution Take 3 mLs by nebulization every 4 (four) hours as needed for Wheezing or Shortness of Breath. Rescue 120 each 6    allopurinoL (ZYLOPRIM) 100 MG tablet Take 0.5 tablets (50 mg total) by mouth once daily. 45 tablet 1    amLODIPine (NORVASC) 10 MG tablet Take 0.5 tablets (5 mg total) by mouth once daily. 15 tablet 11    aspirin (ECOTRIN) 81 MG EC tablet Take 1 tablet (81 mg total) by mouth every morning. 90 tablet 3    cholecalciferol, vitamin D3, (VITAMIN D3) 125 mcg (5,000 unit) Tab Take 5,000 Units by mouth once daily.      cholestyramine (QUESTRAN) 4 gram packet Take 1 packet (4 g total) by mouth 2 (two) times daily. 180 packet 3    coenzyme Q10 100 mg capsule Take 100 mg by mouth every morning.      diltiaZEM (CARDIZEM CD) 120 MG Cp24 Take 1 capsule (120 mg total) by mouth once daily. 90 capsule 1    ergocalciferol (VITAMIN D2) 50,000 unit Cap Take 1 capsule (50,000 Units total) by mouth every 7 days. 12 capsule 1    ferrous sulfate 325 (65 FE) MG EC tablet Take 325 mg by mouth once daily.      fish oil-omega-3 fatty acids 300-1,000 mg capsule Take 2 capsules by mouth every morning.      flaxseed oiL 1,000 mg Cap Take 1 capsule by mouth once daily.      fluticasone-salmeterol diskus inhaler 250-50 mcg Inhale 1 puff into the lungs 2  (two) times daily. 3 each 1    gabapentin (NEURONTIN) 100 MG capsule Take 1 capsule (100 mg total) by mouth every evening. 90 capsule 1    ipratropium-albuteroL (COMBIVENT RESPIMAT)  mcg/actuation inhaler Inhale 2 puffs into the lungs every 4 (four) hours as needed for Shortness of Breath. Rescue 12 g 3    isosorbide mononitrate (IMDUR) 120 MG 24 hr tablet Take 1 tablet (120 mg total) by mouth once daily. 90 tablet 1    metoprolol succinate (TOPROL-XL) 50 MG 24 hr tablet Take 1 tablet (50 mg total) by mouth once daily. 90 tablet 1    nitroGLYCERIN 0.4 MG/DOSE TL SPRY (NITROLINGUAL) 400 mcg/spray spray Place 1 spray under the tongue every 5 (five) minutes as needed for Chest pain (max of 3 doses). 4.9 g 1    pantoprazole (PROTONIX) 40 MG tablet Take 1 tablet (40 mg total) by mouth once daily. 90 tablet 1    polycarbophil (FIBERCON) 625 mg tablet Take 625 mg by mouth once daily.      ranolazine (RANEXA) 500 MG Tb12 Take 1 tablet (500 mg total) by mouth 2 (two) times daily. 180 tablet 1    torsemide (DEMADEX) 10 MG Tab Take 1 tablet (10 mg total) by mouth once daily. 90 tablet 1    triamcinolone acetonide 0.1% (KENALOG) 0.1 % cream Apply topically 2 (two) times daily. (Patient taking differently: Apply 1 g topically 2 (two) times daily.) 453 g 1    umeclidinium (INCRUSE ELLIPTA) 62.5 mcg/actuation inhalation capsule Inhale 62.5 mcg into the lungs every morning. Controller 30 each 11    vit C/E/Zn/coppr/lutein/zeaxan (PRESERVISION AREDS-2 ORAL) Take 1 tablet by mouth once daily.      vitamins A,C,E-zinc-copper (ICAPS AREDS) 14,320-226-200 unit-mg-unit Cap Take 1 capsule by mouth once daily.      colchicine (COLCRYS) 0.6 mg tablet Take 1/2 tablet by mouth every other day (Patient not taking: Reported on 1/16/2023) 15 tablet 11    temazepam (RESTORIL) 15 mg Cap Take 1 capsule (15 mg total) by mouth once daily at 6am. (Patient not taking: Reported on 1/16/2023) 30 capsule 4    traMADoL (ULTRAM) 50 mg tablet Take 1  tablet (50 mg total) by mouth 2 (two) times daily as needed for Pain. (Patient not taking: Reported on 1/16/2023) 30 tablet 0    [DISCONTINUED] benazepriL (LOTENSIN) 40 MG tablet Take 1 tablet (40 mg total) by mouth once daily. 90 tablet 1    [DISCONTINUED] cimetidine (TAGAMET) 300 MG tablet Take 1 tablet (300 mg total) by mouth every 6 (six) hours. Take 1 tablet (300 mg total) by mouth starting at 6 PM on Sunday April 17, 2022 (the evening before your angiogram procedure). Then take 1 tablet at 12 AM, then take 1 tablet at 6 AM on Monday April 18th. 3 tablet 0    [DISCONTINUED] furosemide (LASIX) 20 MG tablet Take 2 tablets (40 mg total) by mouth once daily. 45 tablet 1    [DISCONTINUED] lisinopriL 10 MG tablet Take 1 tablet (10 mg total) by mouth once daily. HOLD UNTIL OTHERWISE DIRECTED BY PRIMARY CARE PROVIDER 90 tablet 3    [DISCONTINUED] lovastatin (MEVACOR) 40 MG tablet Take 1 tablet (40 mg total) by mouth every other day. 45 tablet 3    [DISCONTINUED] ticagrelor (BRILINTA) 90 mg tablet Take 1 tablet (90 mg total) by mouth 2 (two) times a day. 180 tablet 1       Scheduled meds:   sodium chloride 0.9%   Intravenous Once    albuterol-ipratropium  3 mL Nebulization Q6H WAKE    aspirin  81 mg Oral QAM    atorvastatin  40 mg Oral Daily    chlorhexidine  15 mL Mouth/Throat BID    cholestyramine-aspartame  1 packet Oral BID    diltiaZEM  120 mg Oral Daily    heparin (porcine)  5,000 Units Subcutaneous Q8H    hydrALAZINE  50 mg Oral Q8H    [START ON 1/20/2023] levoFLOXacin  250 mg Oral Every other day    metoprolol tartrate  50 mg Oral TID    mupirocin   Nasal BID    nitroGLYCERIN 2% TD oint  1 inch Topical (Top) Q6H    pantoprazole  40 mg Oral Before breakfast    ranolazine  500 mg Oral Daily    ticagrelor  90 mg Oral Q12H       Infusions:        PRN meds:  albuterol, heparin (porcine), hydrALAZINE, melatonin, ondansetron, prochlorperazine, sodium chloride 0.9%, sodium chloride 0.9%    Review of  Systems:  Negative    OBJECTIVE:     Vital Signs and IO:  Temp:  [98 °F (36.7 °C)-99.1 °F (37.3 °C)]   Pulse:  [60-97]   Resp:  [15-36]   BP: ()/()   SpO2:  [71 %-100 %]   I/O last 3 completed shifts:  In: 1050 [P.O.:1000; IV Piggyback:50]  Out: 2253 [Urine:2252; Stool:1]  Wt Readings from Last 5 Encounters:   01/16/23 84.3 kg (185 lb 13.6 oz)   01/17/23 83.9 kg (185 lb)   01/10/23 79.7 kg (175 lb 12.8 oz)   11/03/22 83.5 kg (184 lb)   10/31/22 83.7 kg (184 lb 9.6 oz)     Body mass index is 32.92 kg/m².    Physical Exam  Constitutional:       General: She is not in acute distress.     Appearance: She is well-developed. She is not diaphoretic.   HENT:      Head: Normocephalic and atraumatic.      Mouth/Throat:      Mouth: Mucous membranes are moist.   Eyes:      General: No scleral icterus.     Pupils: Pupils are equal, round, and reactive to light.   Cardiovascular:      Rate and Rhythm: Normal rate and regular rhythm.   Pulmonary:      Effort: Pulmonary effort is normal. No respiratory distress.      Breath sounds: Faint Crackles.  Abdominal:      General: There is no distension.      Palpations: Abdomen is soft.   Musculoskeletal:         General: No deformity. Normal range of motion.      Cervical back: Neck supple.   Skin:     General: Skin is warm and dry.      Findings: No rash present. No erythema.  + Edema  Neurological:      Mental Status: She is alert and oriented to person, place, and time.      Cranial Nerves: No cranial nerve deficit.   Psychiatric:         Behavior: Behavior normal.     Laboratory:  Recent Labs   Lab 01/17/23  0504 01/18/23  0441 01/19/23  0511    140 140   K 3.9 3.6 3.9   CL 97 99 103   CO2 30* 31* 30*   BUN 52* 51* 54*   CREATININE 3.6* 3.8* 3.5*    91 118*         Recent Labs   Lab 01/16/23  0025 01/16/23  0750 01/16/23  0832 01/17/23  0504 01/18/23  0441 01/19/23  0511   CALCIUM 7.4* 7.0*  7.0*   < > 7.5* 8.0* 8.1*   ALBUMIN 3.1* 2.6*   < > 2.2* 2.2* 2.4*    MG 1.9 1.7  --   --   --   --     < > = values in this interval not displayed.               No results for input(s): POCTGLUCOSE in the last 168 hours.    Recent Labs   Lab 10/26/22  1415 11/02/22  1305 01/06/23  1408   Hemoglobin A1C 5.1 4.9 4.8         Recent Labs   Lab 01/16/23  0025 01/16/23  0750 01/17/23  0504 01/18/23  0441 01/19/23  0511   WBC 34.16* 39.12* 16.60* 12.71* 12.57   HGB 12.2 11.7* 10.1* 9.7* 10.5*   HCT 39.8 38.5 31.9* 31.2* 33.9*    164 109* 98* 141*   MCV 96 97 93 94 95   MCHC 30.7* 30.4* 31.7* 31.1* 31.0*   MONO 0.0* 1.0*  --   --   --          Recent Labs   Lab 01/17/23  0504 01/18/23  0441 01/19/23  0511   BILITOT 0.5 0.5 0.8   PROT 4.9* 5.0* 5.5*   ALBUMIN 2.2* 2.2* 2.4*   ALKPHOS 45* 49* 60   ALT 12 13 12   AST 17 17 16         Recent Labs   Lab 09/24/21  1308 02/24/22  0710 01/09/23  1328 01/16/23  0401   Color, UA Yellow Yellow Yellow Yellow   Appearance, UA Clear Hazy A Hazy A Clear   pH, UA 7.0 5.0 5.0 5.0   Specific [place], UA 1.015 1.010 1.010 1.015   Protein, UA Negative Negative 1+ A 1+ A   Glucose, UA Negative Negative Negative Negative   Ketones, UA Negative Negative Negative Negative   Urobilinogen, UA Negative  --  Negative Negative   Bilirubin (UA) Negative Negative Negative Negative   Occult Blood UA Negative Negative Trace A Negative   Nitrite, UA Negative Negative Negative Negative   RBC, UA  --  2 4 2   WBC, UA  --  12 H 4 12 H   Bacteria  --  Occasional Occasional Rare   Hyaline Casts, UA  --  4 A 5 A 5 A         Recent Labs   Lab 01/16/23  0021   Sample VENOUS         Microbiology Results (last 7 days)       Procedure Component Value Units Date/Time    Blood culture [[id number]] Collected: 01/16/23 0407    Order Status: Completed Specimen: Blood from Peripheral, Antecubital, Left Updated: 01/19/23 0432     Blood Culture, Routine No Growth to date      No Growth to date      No Growth to date      No Growth to date    Blood culture [[id number]] Collected:  01/16/23 0235    Order Status: Completed Specimen: Blood from Peripheral, Upper Arm, Left Updated: 01/19/23 0432     Blood Culture, Routine No Growth to date      No Growth to date      No Growth to date      No Growth to date    Urine culture [[id number]]  (Abnormal)  (Susceptibility) Collected: 01/16/23 0401    Order Status: Completed Specimen: Urine Updated: 01/18/23 0750     Urine Culture, Routine KLEBSIELLA PNEUMONIAE  >100,000 cfu/ml      Narrative:      Specimen Source->Urine            ASSESSMENT/PLAN:     ISSA, UA looks bland ? hemodynamic 2/2 GIB, hypovolemia 2/2 N/V 2/2 choledocholithiasis, polypharmacy with multifactorial ATN  CKD stage 3, baseline sCr around 1.4-1.5  Emergent HD done 1/16 for hyperkalemia  Renal function is improving slowly-nonoliguric  No acute RRT needs- remove dialysis catheter  No NSAIDs or IV contrast  Dose meds for CrCl < 20    Anemia of CKD  Stable    MBD / Secondary HPT  No active issues    HTN  HFpEF  BP high  Agree with hydralazine  Start lasix 40mg daily  Hold ACE, ARB  Renal diet at home- must do low salt 2g and fluid restrict 1.5L/day    Daily weight, HR/BP log BID and call office Monday with readings.  Repeat renal panel Monday.  F/U in office within 1-2 weeks.    Thank you for allowing us to participate in the care of your patient!   We will follow the patient and provide recommendations as needed.    Patient care time was spent personally by me on the following activities: > 35 min    Obtaining a history.  Examination of patient.  Providing medical care at the patients bedside.  Developing a treatment plan with patient or surrogate and bedside caregivers.  Ordering and reviewing laboratory studies, radiographic studies, pulse oximetry.  Ordering and performing treatments and interventions.  Evaluation of patient's response to treatment.  Discussions with consultants while on the unit and immediately available to the patient.  Re-evaluation of the patient's  condition.  Documentation in the medical record.     Barbi Huggins MD      [facility]  [address] [place] [address] [id number] [phone] - tel  [phone] - fax    1/19/2023

## 2023-01-19 NOTE — CARE UPDATE
01/19/23 0855   Patient Assessment/Suction   Level of Consciousness (AVPU) alert   Respiratory Effort Normal;Unlabored   Expansion/Accessory Muscles/Retractions no use of accessory muscles   All Lung Fields Breath Sounds clear   Cough Frequency infrequent   PRE-TX-O2   Device (Oxygen Therapy) nasal cannula   $ Is the patient on Low Flow Oxygen? Yes   Flow (L/min) 2   SpO2 99 %   Pulse 83   Resp 20   Aerosol Therapy   $ Aerosol Therapy Charges Aerosol Treatment   Daily Review of Necessity (SVN) completed   Respiratory Treatment Status (SVN) given   Treatment Route (SVN) mask;oxygen   Patient Position (SVN) sitting in chair   Post Treatment Assessment (SVN) breath sounds unchanged   Signs of Intolerance (SVN) none   Breath Sounds Post-Respiratory Treatment   Throughout All Fields Post-Treatment All Fields   Throughout All Fields Post-Treatment no change   Post-treatment Heart Rate (beats/min) 77   Education   $ Education Bronchodilator;15 min   Respiratory Evaluation   $ Care Plan Tech Time 15 min   $ Eval/Re-eval Charges Re-evaluation

## 2023-01-20 ENCOUNTER — TELEPHONE (OUTPATIENT)
Dept: FAMILY MEDICINE | Facility: CLINIC | Age: 78
End: 2023-01-20

## 2023-01-20 ENCOUNTER — PATIENT OUTREACH (OUTPATIENT)
Dept: FAMILY MEDICINE | Facility: CLINIC | Age: 78
End: 2023-01-20

## 2023-01-20 NOTE — PROGRESS NOTES
Discharge Information     Discharge Date:1/19/2023       Primary Discharge Diagnosis:  ISSA      Discharge Summary:  Reviewed      Medication & Order Review     Were medication changes made or new medications added?   Yes    If so, has the patient filled the prescriptions?  Yes     Was Home Health ordered? No    If so, has Home Health contacted patient and/or initiated services?  No    Name of Home Health Agency? N/A    Durable Medical Equipment ordered?  No     If so, has the DME provider contacted patient and delivered equipment?  N/A    Follow Up               Any problems since discharge? No    How is the patient feeling since returning home?  Spoke to pt and she stated she is feeling better since being home.     Have you set up recommended follow up appointments?  Dr. Varghese and Dr. Lopez pt is going to call    Schedule Hospital Follow-up appointment within 7-14 days (preferably 7).      Notes:   Spoke to pt and she stated she is feeling better since being home.             Bhavna Walters

## 2023-01-20 NOTE — TELEPHONE ENCOUNTER
----- Message from Deepthi Soriano LPN sent at 1/18/2023  5:11 PM CST -----  Regarding: HFU  Call patient - needs post-hospital phone call within 2 business days and hospital follow up visit scheduled within 7-14 days.    Expected discharge- 1/19/23    Scheduled. -1/24/23@2:00

## 2023-01-20 NOTE — DISCHARGE SUMMARY
"[facility] Medicine  Discharge Summary      Patient Name: Marisol Kerr  MRN: [id number]  YURY: [id number]  Patient Class: IP- Inpatient  Admission Date: 1/15/2023  Hospital Length of Stay: 3 days  Discharge Date and Time:  01/19/2023 7:59 PM  Attending Physician: No att. providers found   Discharging Provider: Dean Vanessa MD  Primary Care Provider: Khadar Dwyer MD    Primary Care Team: Networked reference to record PCT     HPI:   78 yo F with PMH including CAD, COPD, DM, HFpEF who presents for weakness and vomiting. Patient states she's been in her usual health until this AM when she felt weak and numb particularly her arms which states are usually the strongest. She also endorsed nausea and vomiting followed by abdominal pain after the vomiting. Emesis coffee ground in appearance. Denies fevers/chills. In the ED, patient's vitals were pertinent for HR in 40s though patient asymptomatic, received atropine en route with EMS with minimal effect. Labs reveal leukocytosis 34, k 6.6, and creat 5.3. Imaging reveal "Interval development of intrahepatic and extrahepatic pneumobilia with stranding along the common bile duct" as well as "Punctate 2 mm density in the common bile duct as described above suspicious for ductal stone." Patient started on fluids, antibiotics. Call out was placed to GI. Patient admitted to hospitalist service for further work-up and management      Procedure(s) (LRB):  ERCP (ENDOSCOPIC RETROGRADE CHOLANGIOPANCREATOGRAPHY) (N/A)      Hospital Course:   Pt got admitted with  Brash syndrome(bradycardia, renal failure, AV juaquin blockade, shock, and hyperkalemia)  Pt had emergent dialysis and ISSA/hyperkalemia got resolved  Found to have Choledocholithiasis and had  biliary sphincterotomy / The biliary tree was swept  Pt was started on iv abx   Pt has h/o Significant coronary disease with blockages in the right coronary and circumflex mid LAD (per recent angiogram;coronary)  She " was treated medically for  subendocardial ischemia because she had chest pain on 1/17/2023  Also found to have UTI  Medications changes as recommended by Cardiology/Nephrology Teams  Pts condition got better and was later discharged to home with         Goals of Care Treatment Preferences:  Code Status: Full Code      Consults:   Consults (From admission, onward)        Status Ordering Provider     Inpatient consult to Gastroenterology  Once        Provider:  Valente Faith MD    Completed KARISSA ACEVEDO     Inpatient consult to Nephrology  Once        Provider:  Jeremías Lopez MD    Completed KARISSA ACEVEDO          No new Assessment & Plan notes have been filed under this hospital service since the last note was generated.  Service: Hospital Medicine    Final Active Diagnoses:    Diagnosis Date Noted POA    Coronary artery disease, multivessel with history of previous PCI [I25.10] 06/03/2013 Yes    Class 2 obesity in adult [E66.9] 05/18/2015 Yes      Problems Resolved During this Admission:    Diagnosis Date Noted Date Resolved POA    PRINCIPAL PROBLEM:  ISSA (acute kidney injury) [N17.9] 09/18/2019 01/19/2023 Unknown    Hyperkalemia [E87.5] 02/10/2022 01/19/2023 Unknown    Choledocholithiasis [K80.50] 01/16/2023 01/19/2023 Unknown    UTI (urinary tract infection) [N39.0] 01/17/2023 01/19/2023 Yes    Bradycardia [R00.1] 01/16/2023 01/19/2023 Unknown       Discharged Condition: good    [place] [facility] [place] Up:   Follow-up Information     Erik Varghese MD. Call in 1 week(s).    Specialties: Interventional Cardiology, Cardiology  Why: Office will call patient with appointment details.  Contact information:  [address] [address]  [phone]             Jeremías Lopez MD Follow up in 1 week(s).    Specialty: Nephrology  Why: Office request patient schedule appointment with office.  Contact information:  [address]  [facility]  [place] [address]  [phone]             Khadar Dwyer MD Follow up.    Specialties: Family Medicine, Home Health Services, Hospice Services  Why: office will call patient with an appointment.  Contact information:  [address] [facility]  [address]  [facility]  [place] [address]  [phone]                       Patient Instructions:      Ambulatory referral/consult to Home Health   Standing Status: Future   Referral Priority: Routine Referral Type: Home Health Care   Referral Reason: Specialty Services Required   Requested Specialty: Home Health Services   Number of Visits Requested: 1     Diet Cardiac       Significant Diagnostic Studies: Labs:   CMP   Recent Labs   Lab 01/18/23 0441 01/19/23  0511    140   K 3.6 3.9   CL 99 103   CO2 31* 30*   GLU 91 118*   BUN 51* 54*   CREATININE 3.8* 3.5*   CALCIUM 8.0* 8.1*   PROT 5.0* 5.5*   ALBUMIN 2.2* 2.4*   BILITOT 0.5 0.8   ALKPHOS 49* 60   AST 17 16   ALT 13 12   ANIONGAP 10 7*    and CBC   Recent Labs   Lab 01/18/23 0441 01/19/23  0511   WBC 12.71* 12.57   HGB 9.7* 10.5*   HCT 31.2* 33.9*   PLT 98* 141*       Pending Diagnostic Studies:     Procedure Component Value Units Date/Time    EKG 12-lead [[id number]]     Order Status: Sent Lab Status: No result          Medications:  Reconciled Home Medications:      Medication List      START taking these medications    atorvastatin 40 MG tablet  Commonly known as: LIPITOR  Take 1 tablet (40 mg total) by mouth once daily.  Start taking on: January 20, 2023     BRILINTA 90 mg tablet  Generic drug: ticagrelor  Take 1 tablet (90 mg total) by mouth every 12 (twelve) hours.     furosemide 40 MG tablet  Commonly known as: LASIX  Take 1 tablet (40 mg total) by mouth once daily.     hydrALAZINE 50 MG tablet  Commonly known as: APRESOLINE  Take 1 tablet (50 mg total) by mouth 3 (three) times daily.     levoFLOXacin 250 MG tablet  Commonly known as: LEVAQUIN  Take 1 tablet (250 mg total) by  mouth once daily. for 7 days     metoprolol tartrate 50 MG tablet  Commonly known as: LOPRESSOR  Take 1 tablet (50 mg total) by mouth 3 (three) times daily.        CONTINUE taking these medications    albuterol 90 mcg/actuation inhaler  Commonly known as: VENTOLIN HFA  Inhale 2 puffs into the lungs every 6 (six) hours as needed for Wheezing or Shortness of Breath. Rescue     allopurinoL 100 MG tablet  Commonly known as: ZYLOPRIM  Take 0.5 tablets (50 mg total) by mouth once daily.     amLODIPine 10 MG tablet  Commonly known as: NORVASC  Take 0.5 tablets (5 mg total) by mouth once daily.     aspirin 81 MG EC tablet  Commonly known as: ECOTRIN  Take 1 tablet (81 mg total) by mouth every morning.     cholestyramine 4 gram packet  Commonly known as: QUESTRAN  Take 1 packet (4 g total) by mouth 2 (two) times daily.     coenzyme Q10 100 mg capsule  Take 100 mg by mouth every morning.     * CombiVENT RESPIMAT  mcg/actuation inhaler  Generic drug: ipratropium-albuteroL  Inhale 2 puffs into the lungs every 4 (four) hours as needed for Shortness of Breath. Rescue     * albuterol-ipratropium 2.5 mg-0.5 mg/3 mL nebulizer solution  Commonly known as: DUO-NEB  Take 3 mLs by nebulization every 4 (four) hours as needed for Wheezing or Shortness of Breath. Rescue     diltiaZEM 120 MG Cp24  Commonly known as: CARDIZEM CD  Take 1 capsule (120 mg total) by mouth once daily.     ergocalciferol 50,000 unit Cap  Commonly known as: VITAMIN D2  Take 1 capsule (50,000 Units total) by mouth every 7 days.     ferrous sulfate 325 (65 FE) MG EC tablet  Take 325 mg by mouth once daily.     fish oil-omega-3 fatty acids 300-1,000 mg capsule  Take 2 capsules by mouth every morning.     fluticasone-salmeterol 250-50 mcg/dose 250-50 mcg/dose diskus inhaler  Commonly known as: ADVAIR DISKUS  Inhale 1 puff into the lungs 2 (two) times daily.     gabapentin 100 MG capsule  Commonly known as: NEURONTIN  Take 1 capsule (100 mg total) by mouth every  evening.     INCRUSE ELLIPTA 62.5 mcg/actuation inhalation capsule  Generic drug: umeclidinium  Inhale 62.5 mcg into the lungs every morning. Controller     isosorbide mononitrate 120 MG 24 hr tablet  Commonly known as: IMDUR  Take 1 tablet (120 mg total) by mouth once daily.     nitroGLYCERIN 0.4 MG/DOSE TL SPRY 400 mcg/spray spray  Commonly known as: NITROLINGUAL  Place 1 spray under the tongue every 5 (five) minutes as needed for Chest pain (max of 3 doses).     pantoprazole 40 MG tablet  Commonly known as: PROTONIX  Take 1 tablet (40 mg total) by mouth once daily.     polycarbophil 625 mg tablet  Commonly known as: FIBERCON  Take 625 mg by mouth once daily.     PRESERVISION AREDS-2 ORAL  Take 1 tablet by mouth once daily.     ranolazine 500 MG Tb12  Commonly known as: RANEXA  Take 1 tablet (500 mg total) by mouth 2 (two) times daily.     VITAMIN D3 125 mcg (5,000 unit) Tab  Generic drug: cholecalciferol (vitamin D3)  Take 5,000 Units by mouth once daily.         * This list has 2 medication(s) that are the same as other medications prescribed for you. Read the directions carefully, and ask your doctor or other care provider to review them with you.            STOP taking these medications    colchicine 0.6 mg tablet  Commonly known as: COLCRYS     doxycycline 100 MG Cap  Commonly known as: VIBRAMYCIN     flaxseed oiL 1,000 mg Cap     guaiFENesin 600 mg 12 hr tablet  Commonly known as: MUCINEX     guaiFENesin-codeine 100-10 mg/5 ml  mg/5 mL syrup  Commonly known as: TUSSI-ORGANIDIN NR     [facility] AREDS [phone] unit-mg-unit Cap  Generic drug: vitamins A,C,E-zinc-copper     metoprolol succinate 50 MG 24 hr tablet  Commonly known as: TOPROL-XL     predniSONE 20 MG tablet  Commonly known as: DELTASONE     temazepam 15 mg Cap  Commonly known as: RESTORIL     torsemide 10 MG Tab  Commonly known as: DEMADEX     traMADoL 50 mg tablet  Commonly known as: ULTRAM     triamcinolone acetonide 0.1% 0.1 %  cream  Commonly known as: KENALOG            Indwelling Lines/Drains at time of discharge:   Lines/Drains/Airways     None               Physical Exam  Cardiovascular:      Rate and Rhythm: Normal rate.   Neurological:      Mental Status: She is alert and oriented to person, place, and time.       Time spent on the discharge of patient: 44 minutes         Dean Vanessa MD  Department of Hospital Medicine  [facility]

## 2023-01-21 LAB
BACTERIA BLD CULT: NORMAL
BACTERIA BLD CULT: NORMAL

## 2023-01-23 ENCOUNTER — TELEPHONE (OUTPATIENT)
Dept: FAMILY MEDICINE | Facility: CLINIC | Age: 78
End: 2023-01-23

## 2023-01-23 ENCOUNTER — PATIENT OUTREACH (OUTPATIENT)
Dept: FAMILY MEDICINE | Facility: CLINIC | Age: 78
End: 2023-01-23

## 2023-01-23 NOTE — TELEPHONE ENCOUNTER
----- Message from Bhavna Walters LPN sent at 1/19/2023  8:07 AM CST -----  Regarding: FW: OSVALDOU    ----- Message -----  From: Bhavna Walters LPN  Sent: 1/19/2023  12:00 AM CST  To: Antonieta Marquez Staff  Subject: FW: OSVALDOU                                            ----- Message -----  From: Bhavna Walters LPN  Sent: 1/18/2023  12:00 AM CST  To: Antonieta Marquez Staff  Subject: HFU                                              See if Pt is Discharged from the hospital

## 2023-01-23 NOTE — PROGRESS NOTES
Discharge Information     Discharge Date:   1/19/2023    Primary Discharge Diagnosis:  ISSA      Discharge Summary:  Reviewed      Medication & Order Review     Were medication changes made or new medications added?   Yes    If so, has the patient filled the prescriptions?  Yes     Was Home Health ordered? No    If so, has Home Health contacted patient and/or initiated services?  No    Name of Home Health Agency? N/A    Durable Medical Equipment ordered?  No     If so, has the DME provider contacted patient and delivered equipment?  N/A    Follow Up               Any problems since discharge? No    How is the patient feeling since returning home?  Pt stated she is feeling a little achy from all the cold weather. Pt stated she is feeling better.     Have you set up recommended follow up appointments?  [facility] Pt stated their office was going to call. Dr. Lopez pt stated she would call to schedule.     Schedule Hospital Follow-up appointment within 7-14 days (preferably 7).      Notes:   Pt stated she is feeling a little achy from all the cold weather. Pt stated she is feeling better. Pt is scheduled with Antonieta 1/24 at 2pm     Bhavna Walters

## 2023-01-24 ENCOUNTER — OFFICE VISIT (OUTPATIENT)
Dept: FAMILY MEDICINE | Facility: CLINIC | Age: 78
End: 2023-01-24
Payer: MEDICARE

## 2023-01-24 ENCOUNTER — TELEPHONE (OUTPATIENT)
Dept: FAMILY MEDICINE | Facility: CLINIC | Age: 78
End: 2023-01-24

## 2023-01-24 VITALS
HEIGHT: 63 IN | OXYGEN SATURATION: 95 % | BODY MASS INDEX: 32.46 KG/M2 | HEART RATE: 62 BPM | WEIGHT: 183.19 LBS | DIASTOLIC BLOOD PRESSURE: 58 MMHG | SYSTOLIC BLOOD PRESSURE: 118 MMHG

## 2023-01-24 DIAGNOSIS — D63.1 ANEMIA DUE TO STAGE 4 CHRONIC KIDNEY DISEASE: ICD-10-CM

## 2023-01-24 DIAGNOSIS — N18.4 ANEMIA DUE TO STAGE 4 CHRONIC KIDNEY DISEASE: ICD-10-CM

## 2023-01-24 DIAGNOSIS — N17.9 ACUTE RENAL FAILURE SUPERIMPOSED ON STAGE 4 CHRONIC KIDNEY DISEASE, UNSPECIFIED ACUTE RENAL FAILURE TYPE: Primary | ICD-10-CM

## 2023-01-24 DIAGNOSIS — J44.9 CHRONIC OBSTRUCTIVE PULMONARY DISEASE, UNSPECIFIED COPD TYPE: ICD-10-CM

## 2023-01-24 DIAGNOSIS — J96.11 CHRONIC HYPOXEMIC RESPIRATORY FAILURE: ICD-10-CM

## 2023-01-24 DIAGNOSIS — N18.4 ACUTE RENAL FAILURE SUPERIMPOSED ON STAGE 4 CHRONIC KIDNEY DISEASE, UNSPECIFIED ACUTE RENAL FAILURE TYPE: Primary | ICD-10-CM

## 2023-01-24 DIAGNOSIS — K80.50 CHOLEDOCHOLITHIASIS: ICD-10-CM

## 2023-01-24 DIAGNOSIS — I21.4 NSTEMI (NON-ST ELEVATED MYOCARDIAL INFARCTION): ICD-10-CM

## 2023-01-24 PROCEDURE — 99496 TRANSJ CARE MGMT HIGH F2F 7D: CPT | Mod: S$GLB,,, | Performed by: NURSE PRACTITIONER

## 2023-01-24 PROCEDURE — 99496 TRANSITIONAL CARE MANAGE SERVICE 7 DAY DISCHARGE: ICD-10-PCS | Mod: S$GLB,,, | Performed by: NURSE PRACTITIONER

## 2023-01-24 NOTE — LETTER
[address] [facility] [address]  [place] [address]  Phone: [phone]   [phone]                        MD Nely Humphreys, MD Dereje Mays, PAGUILLERMINA Mcclendon, VICENTE Marquez, VICENTE Cain, VICENTE Leon PA-C      Date: 01/24/2023        Patient: Marisol Kerr  YOB: 1945    Please draw a CBC w/ Diff and CMP per Antonieta. Orders have been placed      Sincerely,   Electronically Signed By: Antonieta Marquez, VICENTE Walters LPN

## 2023-01-24 NOTE — TELEPHONE ENCOUNTER
Letter has been sent to [facility] to draw labs that have been ordered     Spoke to Alok from [facility] and let her know per Antonieta an order for Physical therapy .Alok stated pt already has an order for PT and Ot from the hospital. let alok know Antonieta has ordered a CMP and CBC to be drawn next week. Anastasia verbalized understanding

## 2023-01-24 NOTE — PROGRESS NOTES
" Patient ID: Marisol Kerr is a 77 y.o. female.    Chief Complaint: Follow-up (Bottles brought//Pt is here for a HFU for ISSA.//Dexa order in 05/2022, but not done yet.//Order foot exam today//ZACH )      Admit Date: 1/15/23   Discharge Date: 1/19/23  Discharge Facility: Hospital    Medication Reconciliation:  Medications changed/added/deleted. Brilinta, atorvastatin, furosemide, hydralazine, metoprolol and levaquin added  New Prescriptions filled after discharge: yes  Discharge summary reviewed:  yes  Pending test results at discharge reviewed:   not applicable  Follow up appointments scheduled:  yes   with Cardiology  Dr. Benjamin next month and Dr. Lopez next month  Follow up labs/tests ordered:   no  Home Health ordered on discharge:   yes  Home Health company name: [facility]  DME ordered at discharge:   no    Discharge summary:  78 yo F with PMH including CAD, COPD, DM, HFpEF who presents for weakness and vomiting. Patient states she's been in her usual health until this AM when she felt weak and numb particularly her arms which states are usually the strongest. She also endorsed nausea and vomiting followed by abdominal pain after the vomiting. Emesis coffee ground in appearance. Denies fevers/chills. In the ED, patient's vitals were pertinent for HR in 40s though patient asymptomatic, received atropine en route with EMS with minimal effect. Labs reveal leukocytosis 34, k 6.6, and creat 5.3. Imaging reveal "Interval development of intrahepatic and extrahepatic pneumobilia with stranding along the common bile duct" as well as "Punctate 2 mm density in the common bile duct as described above suspicious for ductal stone." Patient started on fluids, antibiotics. Call out was placed to GI. 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 Dr. Burt recommended outpt stress test and consider stenting of LCX and LAD in the future. Continue brilinta/ASA and keep HR below 70  Also found to have UTI  Medications changes as recommended by Cardiology/Nephrology Teams  Pts condition got better and was later discharged to home with      Hospital echo: mod conc LVH, nml sys fxn EF 70%, mild MR, normal RV size/fxn  Discharge BUN/CR 54/3.5, H/H 10.5/33.9    How patient is feeling since discharge from the hospital?  Pt here with daughter today.     Pt c/oing of fatigue since discharge. Denies any abd pain, nausea or vomiting. Appetite is reduced, eating small amts. Having diarrhea several times a day which is not unusual for her. Reports increased cholestyramine to BID to see if that will help.    Has  SN coming out- pt states she doesn't think she needs PT but daughter feels she needs it. Daughter feels she should have gone to rehab/SNF d/t weakness. Using WC when she leaves the house but walking with cane inside the house.    Hx of Copd and chronic resp failure- Reports her SOB has worsened over the past couple months- follows with Eliane yHde NP pulm. Pt/daughter report at last visit pt was advised she could stop her controller inhalers because her insp effort was so poor and  recommended neb treatments only.     Pt treated for DM years ago though has been off meds for several years. A1C 4.8% in hospital    Patient follow up phone call documented on separate  encounter.    No results displayed because visit has over 200 results.      Admission on 01/09/2023, Discharged on 01/10/2023   Component Date Value Ref Range Status    Lipase 01/09/2023 41  4 - 60 U/L Final    Benzodiazepines 01/09/2023 Presumptive Positive (A)  Negative Final    Cocaine (Metab.) 01/09/2023 Negative  Negative Final    Opiate Scrn, Ur 01/09/2023 Negative  Negative Final    Barbiturate Screen, Ur 01/09/2023 Negative  Negative Final    Amphetamine Screen, Ur 01/09/2023 Negative  Negative Final    THC 01/09/2023 Negative  Negative Final    Phencyclidine 01/09/2023 Negative  Negative Final    Creatinine, Urine 01/09/2023 37.0  15.0 - 325.0 mg/dL Final    Toxicology Information 01/09/2023 SEE COMMENT   Final    Specimen UA 01/09/2023 Urine, Clean Catch   Final    Color, UA 01/09/2023 Yellow  Yellow, Straw, [place] Final    Appearance, UA 01/09/2023 Hazy (A)  Clear Final    pH, UA 01/09/2023 5.0  5.0 - 8.0 Final    Specific Gravity, UA 01/09/2023 1.010  1.005 - 1.030 Final    Protein, UA 01/09/2023 1+ (A)  Negative Final    Glucose, UA 01/09/2023 Negative  Negative Final    Ketones, UA 01/09/2023 Negative  Negative Final    Bilirubin (UA) 01/09/2023 Negative  Negative Final    Occult Blood UA 01/09/2023 Trace (A)  Negative Final    Nitrite, UA 01/09/2023 Negative  Negative Final    Urobilinogen, UA 01/09/2023 Negative  Negative EU/dL Final    Leukocytes, UA 01/09/2023 Negative  Negative Final    CPK 01/09/2023 103  20 - 180 U/L Final    Magnesium 01/09/2023 1.9  1.6 - 2.6 mg/dL Final    TSH 01/09/2023 0.270 (L)  0.340 - 5.600 uIU/mL Final    WBC 01/09/2023 11.52  3.90 - 12.70 K/uL Final    RBC 01/09/2023 4.00  4.00 - 5.40 M/uL Final    Hemoglobin 01/09/2023 11.7 (L)  12.0 - 16.0 g/dL Final    Hematocrit 01/09/2023 38.3  37.0 - 48.5 % Final    MCV 01/09/2023 96  82 - 98 fL Final    MCH 01/09/2023 29.3  27.0 - 31.0 pg Final    MCHC 01/09/2023 30.5 (L)  32.0 - 36.0 g/dL Final    RDW 01/09/2023 12.7  11.5 -  14.5 % Final    Platelets 01/09/2023 226  150 - 450 K/uL Final    MPV 01/09/2023 11.0  9.2 - 12.9 fL Final    Immature Granulocytes 01/09/2023 1.4 (H)  0.0 - 0.5 % Final    Gran # (ANC) 01/09/2023 9.4 (H)  1.8 - 7.7 K/uL Final    Immature Grans (Abs) 01/09/2023 0.16 (H)  0.00 - 0.04 K/uL Final    Lymph # 01/09/2023 1.0  1.0 - 4.8 K/uL Final    Mono # 01/09/2023 0.8  0.3 - 1.0 K/uL Final    Eos # 01/09/2023 0.1  0.0 - 0.5 K/uL Final    Baso # 01/09/2023 0.07  0.00 - 0.20 K/uL Final    nRBC 01/09/2023 0  0 /100 WBC Final    Gran % 01/09/2023 81.8 (H)  38.0 - 73.0 % Final    Lymph % 01/09/2023 8.7 (L)  18.0 - 48.0 % Final    Mono % 01/09/2023 6.7  4.0 - 15.0 % Final    Eosinophil % 01/09/2023 0.8  0.0 - 8.0 % Final    Basophil % 01/09/2023 0.6  0.0 - 1.9 % Final    Differential Method 01/09/2023 Automated   Final    Sodium 01/09/2023 139  136 - 145 mmol/L Final    Potassium 01/09/2023 4.1  3.5 - 5.1 mmol/L Final    Chloride 01/09/2023 102  95 - 110 mmol/L Final    CO2 01/09/2023 28  23 - 29 mmol/L Final    Glucose 01/09/2023 122 (H)  70 - 110 mg/dL Final    BUN 01/09/2023 27 (H)  8 - 23 mg/dL Final    Creatinine 01/09/2023 1.4  0.5 - 1.4 mg/dL Final    Calcium 01/09/2023 8.9  8.7 - 10.5 mg/dL Final    Total Protein 01/09/2023 6.9  6.0 - 8.4 g/dL Final    Albumin 01/09/2023 3.1 (L)  3.5 - 5.2 g/dL Final    Total Bilirubin 01/09/2023 0.4  0.1 - 1.0 mg/dL Final    Alkaline Phosphatase 01/09/2023 75  55 - 135 U/L Final    AST 01/09/2023 20  10 - 40 U/L Final    ALT 01/09/2023 14  10 - 44 U/L Final    Anion Gap 01/09/2023 9  8 - 16 mmol/L Final    eGFR 01/09/2023 39.2 (A)  >60 mL/min/1.73 m^2 Final    Troponin I High Sensitivity 01/09/2023 23.0 (H)  0.0 - 14.9 pg/mL Final    Troponin I High Sensitivity 01/09/2023 23.3 (H)  0.0 - 14.9 pg/mL Final    BNP 01/09/2023 232 (H)  0 - 99 pg/mL Final    SARS-CoV-2 RNA, Amplification, Qual 01/09/2023 Negative  Negative Final    Influenza A, Molecular 01/09/2023 Negative  Negative  Final    Influenza B, Molecular 01/09/2023 Negative  Negative Final    Flu A & B Source 01/09/2023 Nasal swab   Final    Free T4 01/09/2023 0.97  0.71 - 1.51 ng/dL Final    RBC, UA 01/09/2023 4  0 - 4 /hpf Final    WBC, UA 01/09/2023 4  0 - 5 /hpf Final    Bacteria 01/09/2023 Occasional  None-Occ /hpf Final    Squam Epithel, UA 01/09/2023 3  /hpf Final    Hyaline Casts, UA 01/09/2023 5 (A)  0-1/lpf /lpf Final    Microscopic Comment 01/09/2023 SEE COMMENT   Final    Procalcitonin 01/09/2023 0.25  0.00 - 0.50 ng/mL Final    Sodium 01/10/2023 142  136 - 145 mmol/L Final    Potassium 01/10/2023 4.5  3.5 - 5.1 mmol/L Final    Chloride 01/10/2023 100  95 - 110 mmol/L Final    CO2 01/10/2023 33 (H)  23 - 29 mmol/L Final    Glucose 01/10/2023 180 (H)  70 - 110 mg/dL Final    BUN 01/10/2023 26 (H)  8 - 23 mg/dL Final    Creatinine 01/10/2023 1.4  0.5 - 1.4 mg/dL Final    Calcium 01/10/2023 9.5  8.7 - 10.5 mg/dL Final    Total Protein 01/10/2023 7.1  6.0 - 8.4 g/dL Final    Albumin 01/10/2023 3.1 (L)  3.5 - 5.2 g/dL Final    Total Bilirubin 01/10/2023 0.5  0.1 - 1.0 mg/dL Final    Alkaline Phosphatase 01/10/2023 81  55 - 135 U/L Final    AST 01/10/2023 22  10 - 40 U/L Final    ALT 01/10/2023 16  10 - 44 U/L Final    Anion Gap 01/10/2023 9  8 - 16 mmol/L Final    eGFR 01/10/2023 39.2 (A)  >60 mL/min/1.73 m^2 Final    Magnesium 01/10/2023 1.9  1.6 - 2.6 mg/dL Final    WBC 01/10/2023 8.25  3.90 - 12.70 K/uL Final    RBC 01/10/2023 4.05  4.00 - 5.40 M/uL Final    Hemoglobin 01/10/2023 12.0  12.0 - 16.0 g/dL Final    Hematocrit 01/10/2023 38.8  37.0 - 48.5 % Final    MCV 01/10/2023 96  82 - 98 fL Final    MCH 01/10/2023 29.6  27.0 - 31.0 pg Final    MCHC 01/10/2023 30.9 (L)  32.0 - 36.0 g/dL Final    RDW 01/10/2023 12.4  11.5 - 14.5 % Final    Platelets 01/10/2023 210  150 - 450 K/uL Final    MPV 01/10/2023 11.3  9.2 - 12.9 fL Final    Immature Granulocytes 01/10/2023 4.0 (H)  0.0 - 0.5 % Final    Gran # (ANC) 01/10/2023 7.1  1.8  - 7.7 K/uL Final    Immature Grans (Abs) 01/10/2023 0.33 (H)  0.00 - 0.04 K/uL Final    Lymph # 01/10/2023 0.7 (L)  1.0 - 4.8 K/uL Final    Mono # 01/10/2023 0.1 (L)  0.3 - 1.0 K/uL Final    Eos # 01/10/2023 0.0  0.0 - 0.5 K/uL Final    Baso # 01/10/2023 0.02  0.00 - 0.20 K/uL Final    nRBC 01/10/2023 0  0 /100 WBC Final    Gran % 01/10/2023 86.6 (H)  38.0 - 73.0 % Final    Lymph % 01/10/2023 8.5 (L)  18.0 - 48.0 % Final    Mono % 01/10/2023 0.7 (L)  4.0 - 15.0 % Final    Eosinophil % 01/10/2023 0.0  0.0 - 8.0 % Final    Basophil % 01/10/2023 0.2  0.0 - 1.9 % Final    Platelet Estimate 01/10/2023 Clumped (A)   Final    Differential Method 01/10/2023 Automated   Final   Lab Visit on 01/06/2023   Component Date Value Ref Range Status    WBC 01/06/2023 11.92  3.90 - 12.70 K/uL Final    RBC 01/06/2023 4.07  4.00 - 5.40 M/uL Final    Hemoglobin 01/06/2023 12.0  12.0 - 16.0 g/dL Final    Hematocrit 01/06/2023 40.2  37.0 - 48.5 % Final    MCV 01/06/2023 99 (H)  82 - 98 fL Final    MCH 01/06/2023 29.5  27.0 - 31.0 pg Final    MCHC 01/06/2023 29.9 (L)  32.0 - 36.0 g/dL Final    RDW 01/06/2023 13.1  11.5 - 14.5 % Final    Platelets 01/06/2023 206  150 - 450 K/uL Final    MPV 01/06/2023 12.1  9.2 - 12.9 fL Final    Immature Granulocytes 01/06/2023 0.3  0.0 - 0.5 % Final    Gran # (ANC) 01/06/2023 10.1 (H)  1.8 - 7.7 K/uL Final    Immature Grans (Abs) 01/06/2023 0.04  0.00 - 0.04 K/uL Final    Lymph # 01/06/2023 0.8 (L)  1.0 - 4.8 K/uL Final    Mono # 01/06/2023 0.9  0.3 - 1.0 K/uL Final    Eos # 01/06/2023 0.1  0.0 - 0.5 K/uL Final    Baso # 01/06/2023 0.04  0.00 - 0.20 K/uL Final    nRBC 01/06/2023 0  0 /100 WBC Final    Gran % 01/06/2023 84.8 (H)  38.0 - 73.0 % Final    Lymph % 01/06/2023 6.3 (L)  18.0 - 48.0 % Final    Mono % 01/06/2023 7.5  4.0 - 15.0 % Final    Eosinophil % 01/06/2023 0.8  0.0 - 8.0 % Final    Basophil % 01/06/2023 0.3  0.0 - 1.9 % Final    Differential Method 01/06/2023 Automated   Final    Sodium  01/06/2023 138  136 - 145 mmol/L Final    Potassium 01/06/2023 3.9  3.5 - 5.1 mmol/L Final    Chloride 01/06/2023 97  95 - 110 mmol/L Final    CO2 01/06/2023 31 (H)  23 - 29 mmol/L Final    Glucose 01/06/2023 117 (H)  70 - 110 mg/dL Final    BUN 01/06/2023 32 (H)  8 - 23 mg/dL Final    Creatinine 01/06/2023 1.6 (H)  0.5 - 1.4 mg/dL Final    Calcium 01/06/2023 9.5  8.7 - 10.5 mg/dL Final    Total Protein 01/06/2023 7.0  6.0 - 8.4 g/dL Final    Albumin 01/06/2023 3.5  3.5 - 5.2 g/dL Final    Total Bilirubin 01/06/2023 0.6  0.1 - 1.0 mg/dL Final    Alkaline Phosphatase 01/06/2023 94  55 - 135 U/L Final    AST 01/06/2023 14  10 - 40 U/L Final    ALT 01/06/2023 7 (L)  10 - 44 U/L Final    Anion Gap 01/06/2023 10  8 - 16 mmol/L Final    eGFR 01/06/2023 33.4 (A)  >60 mL/min/1.73 m^2 Final    CRP 01/06/2023 135.5 (H)  0.0 - 8.2 mg/L Final    Sed Rate 01/06/2023 52 (H)  0 - 20 mm/Hr Final    Hemoglobin A1C 01/06/2023 4.8  4.0 - 5.6 % Final    Estimated Avg Glucose 01/06/2023 91  68 - 131 mg/dL Final    Prealbumin 01/06/2023 17 (L)  20 - 43 mg/dL Final   Lab Visit on 11/16/2022   Component Date Value Ref Range Status    Sodium 11/16/2022 141  136 - 145 mmol/L Final    Potassium 11/16/2022 5.2 (H)  3.5 - 5.1 mmol/L Final    Chloride 11/16/2022 103  95 - 110 mmol/L Final    CO2 11/16/2022 28  23 - 29 mmol/L Final    Glucose 11/16/2022 114 (H)  70 - 110 mg/dL Final    BUN 11/16/2022 29 (H)  8 - 23 mg/dL Final    Creatinine 11/16/2022 1.8 (H)  0.5 - 1.4 mg/dL Final    Calcium 11/16/2022 9.5  8.7 - 10.5 mg/dL Final    Total Protein 11/16/2022 6.8  6.0 - 8.4 g/dL Final    Albumin 11/16/2022 3.3 (L)  3.5 - 5.2 g/dL Final    Total Bilirubin 11/16/2022 0.3  0.1 - 1.0 mg/dL Final    Alkaline Phosphatase 11/16/2022 109  55 - 135 U/L Final    AST 11/16/2022 21  10 - 40 U/L Final    ALT 11/16/2022 20  10 - 44 U/L Final    Anion Gap 11/16/2022 10  8 - 16 mmol/L Final    eGFR 11/16/2022 29.0 (A)  >60 mL/min/1.73 m^2 Final   Lab Visit on  11/02/2022   Component Date Value Ref Range Status    WBC 11/02/2022 7.90  3.90 - 12.70 K/uL Final    RBC 11/02/2022 3.89 (L)  4.00 - 5.40 M/uL Final    Hemoglobin 11/02/2022 11.5 (L)  12.0 - 16.0 g/dL Final    Hematocrit 11/02/2022 38.1  37.0 - 48.5 % Final    MCV 11/02/2022 98  82 - 98 fL Final    MCH 11/02/2022 29.6  27.0 - 31.0 pg Final    MCHC 11/02/2022 30.2 (L)  32.0 - 36.0 g/dL Final    RDW 11/02/2022 13.4  11.5 - 14.5 % Final    Platelets 11/02/2022 249  150 - 450 K/uL Final    MPV 11/02/2022 11.6  9.2 - 12.9 fL Final    Immature Granulocytes 11/02/2022 0.5  0.0 - 0.5 % Final    Gran # (ANC) 11/02/2022 5.6  1.8 - 7.7 K/uL Final    Immature Grans (Abs) 11/02/2022 0.04  0.00 - 0.04 K/uL Final    Lymph # 11/02/2022 1.4  1.0 - 4.8 K/uL Final    Mono # 11/02/2022 0.7  0.3 - 1.0 K/uL Final    Eos # 11/02/2022 0.1  0.0 - 0.5 K/uL Final    Baso # 11/02/2022 0.05  0.00 - 0.20 K/uL Final    nRBC 11/02/2022 0  0 /100 WBC Final    Gran % 11/02/2022 70.9  38.0 - 73.0 % Final    Lymph % 11/02/2022 18.1  18.0 - 48.0 % Final    Mono % 11/02/2022 8.5  4.0 - 15.0 % Final    Eosinophil % 11/02/2022 1.4  0.0 - 8.0 % Final    Basophil % 11/02/2022 0.6  0.0 - 1.9 % Final    Differential Method 11/02/2022 Automated   Final    Sodium 11/02/2022 143  136 - 145 mmol/L Final    Potassium 11/02/2022 5.3 (H)  3.5 - 5.1 mmol/L Final    Chloride 11/02/2022 104  95 - 110 mmol/L Final    CO2 11/02/2022 28  23 - 29 mmol/L Final    Glucose 11/02/2022 111 (H)  70 - 110 mg/dL Final    BUN 11/02/2022 25 (H)  8 - 23 mg/dL Final    Creatinine 11/02/2022 1.8 (H)  0.5 - 1.4 mg/dL Final    Calcium 11/02/2022 9.5  8.7 - 10.5 mg/dL Final    Total Protein 11/02/2022 6.9  6.0 - 8.4 g/dL Final    Albumin 11/02/2022 3.1 (L)  3.5 - 5.2 g/dL Final    Total Bilirubin 11/02/2022 0.4  0.1 - 1.0 mg/dL Final    Alkaline Phosphatase 11/02/2022 122  55 - 135 U/L Final    AST 11/02/2022 26  10 - 40 U/L Final    ALT 11/02/2022 62 (H)  10 - 44 U/L Final    Anion Gap  11/02/2022 11  8 - 16 mmol/L Final    eGFR 11/02/2022 29 (A)  >60 mL/min/1.73 m^2 Final    CRP 11/02/2022 7.0  0.0 - 8.2 mg/L Final    Hemoglobin A1C 11/02/2022 4.9  4.0 - 5.6 % Final    Estimated Avg Glucose 11/02/2022 94  68 - 131 mg/dL Final    Prealbumin 11/02/2022 21  20 - 43 mg/dL Final    Sed Rate 11/02/2022 31 (H)  0 - 20 mm/Hr Final   Lab Visit on 10/26/2022   Component Date Value Ref Range Status    WBC 10/26/2022 9.50  3.90 - 12.70 K/uL Final    RBC 10/26/2022 3.83 (L)  4.00 - 5.40 M/uL Final    Hemoglobin 10/26/2022 11.3 (L)  12.0 - 16.0 g/dL Final    Hematocrit 10/26/2022 37.3  37.0 - 48.5 % Final    MCV 10/26/2022 97  82 - 98 fL Final    MCH 10/26/2022 29.5  27.0 - 31.0 pg Final    MCHC 10/26/2022 30.3 (L)  32.0 - 36.0 g/dL Final    RDW 10/26/2022 13.9  11.5 - 14.5 % Final    Platelets 10/26/2022 156  150 - 450 K/uL Final    MPV 10/26/2022 12.1  9.2 - 12.9 fL Final    Immature Granulocytes 10/26/2022 0.3  0.0 - 0.5 % Final    Gran # (ANC) 10/26/2022 8.3 (H)  1.8 - 7.7 K/uL Final    Immature Grans (Abs) 10/26/2022 0.03  0.00 - 0.04 K/uL Final    Lymph # 10/26/2022 0.6 (L)  1.0 - 4.8 K/uL Final    Mono # 10/26/2022 0.5  0.3 - 1.0 K/uL Final    Eos # 10/26/2022 0.1  0.0 - 0.5 K/uL Final    Baso # 10/26/2022 0.03  0.00 - 0.20 K/uL Final    nRBC 10/26/2022 0  0 /100 WBC Final    Gran % 10/26/2022 87.0 (H)  38.0 - 73.0 % Final    Lymph % 10/26/2022 6.6 (L)  18.0 - 48.0 % Final    Mono % 10/26/2022 4.7  4.0 - 15.0 % Final    Eosinophil % 10/26/2022 1.1  0.0 - 8.0 % Final    Basophil % 10/26/2022 0.3  0.0 - 1.9 % Final    Differential Method 10/26/2022 Automated   Final    Sodium 10/26/2022 142  136 - 145 mmol/L Final    Potassium 10/26/2022 4.2  3.5 - 5.1 mmol/L Final    Chloride 10/26/2022 100  95 - 110 mmol/L Final    CO2 10/26/2022 29  23 - 29 mmol/L Final    Glucose 10/26/2022 108  70 - 110 mg/dL Final    BUN 10/26/2022 29 (H)  8 - 23 mg/dL Final    Creatinine 10/26/2022 1.7 (H)  0.5 - 1.4 mg/dL Final     Calcium 10/26/2022 9.3  8.7 - 10.5 mg/dL Final    Total Protein 10/26/2022 6.4  6.0 - 8.4 g/dL Final    Albumin 10/26/2022 3.1 (L)  3.5 - 5.2 g/dL Final    Total Bilirubin 10/26/2022 0.5  0.1 - 1.0 mg/dL Final    Alkaline Phosphatase 10/26/2022 188 (H)  55 - 135 U/L Final    AST 10/26/2022 444 (H)  10 - 40 U/L Final    ALT 10/26/2022 669 (H)  10 - 44 U/L Final    Anion Gap 10/26/2022 13  8 - 16 mmol/L Final    eGFR 10/26/2022 31 (A)  >60 mL/min/1.73 m^2 Final    CRP 10/26/2022 132.4 (H)  0.0 - 8.2 mg/L Final    Sed Rate 10/26/2022 45 (H)  0 - 20 mm/Hr Final    Hemoglobin A1C 10/26/2022 5.1  4.0 - 5.6 % Final    Estimated Avg Glucose 10/26/2022 100  68 - 131 mg/dL Final    Prealbumin 10/26/2022 22  20 - 43 mg/dL Final   Lab Visit on 09/28/2022   Component Date Value Ref Range Status    WBC 09/28/2022 6.50  3.90 - 12.70 K/uL Final    RBC 09/28/2022 4.30  4.00 - 5.40 M/uL Final    Hemoglobin 09/28/2022 12.6  12.0 - 16.0 g/dL Final    Hematocrit 09/28/2022 41.1  37.0 - 48.5 % Final    MCV 09/28/2022 96  82 - 98 fL Final    MCH 09/28/2022 29.3  27.0 - 31.0 pg Final    MCHC 09/28/2022 30.7 (L)  32.0 - 36.0 g/dL Final    RDW 09/28/2022 14.7 (H)  11.5 - 14.5 % Final    Platelets 09/28/2022 188  150 - 450 K/uL Final    MPV 09/28/2022 12.1  9.2 - 12.9 fL Final    Immature Granulocytes 09/28/2022 0.2  0.0 - 0.5 % Final    Gran # (ANC) 09/28/2022 4.8  1.8 - 7.7 K/uL Final    Immature Grans (Abs) 09/28/2022 0.01  0.00 - 0.04 K/uL Final    Lymph # 09/28/2022 1.1  1.0 - 4.8 K/uL Final    Mono # 09/28/2022 0.4  0.3 - 1.0 K/uL Final    Eos # 09/28/2022 0.1  0.0 - 0.5 K/uL Final    Baso # 09/28/2022 0.04  0.00 - 0.20 K/uL Final    nRBC 09/28/2022 0  0 /100 WBC Final    Gran % 09/28/2022 74.1 (H)  38.0 - 73.0 % Final    Lymph % 09/28/2022 17.4 (L)  18.0 - 48.0 % Final    Mono % 09/28/2022 6.2  4.0 - 15.0 % Final    Eosinophil % 09/28/2022 1.5  0.0 - 8.0 % Final    Basophil % 09/28/2022 0.6  0.0 - 1.9 % Final    Differential Method  09/28/2022 Automated   Final    Sodium 09/28/2022 143  136 - 145 mmol/L Final    Potassium 09/28/2022 4.8  3.5 - 5.1 mmol/L Final    Chloride 09/28/2022 103  95 - 110 mmol/L Final    CO2 09/28/2022 27  23 - 29 mmol/L Final    Glucose 09/28/2022 97  70 - 110 mg/dL Final    BUN 09/28/2022 36 (H)  8 - 23 mg/dL Final    Creatinine 09/28/2022 1.9 (H)  0.5 - 1.4 mg/dL Final    Calcium 09/28/2022 9.9  8.7 - 10.5 mg/dL Final    Total Protein 09/28/2022 7.5  6.0 - 8.4 g/dL Final    Albumin 09/28/2022 3.9  3.5 - 5.2 g/dL Final    Total Bilirubin 09/28/2022 0.4  0.1 - 1.0 mg/dL Final    Alkaline Phosphatase 09/28/2022 97  55 - 135 U/L Final    AST 09/28/2022 19  10 - 40 U/L Final    ALT 09/28/2022 13  10 - 44 U/L Final    Anion Gap 09/28/2022 13  8 - 16 mmol/L Final    eGFR 09/28/2022 27 (A)  >60 mL/min/1.73 m^2 Final    CRP 09/28/2022 0.5  0.0 - 8.2 mg/L Final    Sed Rate 09/28/2022 10  0 - 20 mm/Hr Final    Prealbumin 09/28/2022 31  20 - 43 mg/dL Final   Lab Visit on 08/26/2022   Component Date Value Ref Range Status    WBC 08/26/2022 6.61  3.90 - 12.70 K/uL Final    RBC 08/26/2022 3.72 (L)  4.00 - 5.40 M/uL Final    Hemoglobin 08/26/2022 10.9 (L)  12.0 - 16.0 g/dL Final    Hematocrit 08/26/2022 35.5 (L)  37.0 - 48.5 % Final    MCV 08/26/2022 95  82 - 98 fL Final    MCH 08/26/2022 29.3  27.0 - 31.0 pg Final    MCHC 08/26/2022 30.7 (L)  32.0 - 36.0 g/dL Final    RDW 08/26/2022 14.6 (H)  11.5 - 14.5 % Final    Platelets 08/26/2022 168  150 - 450 K/uL Final    MPV 08/26/2022 11.9  9.2 - 12.9 fL Final    Immature Granulocytes 08/26/2022 0.3  0.0 - 0.5 % Final    Gran # (ANC) 08/26/2022 4.4  1.8 - 7.7 K/uL Final    Immature Grans (Abs) 08/26/2022 0.02  0.00 - 0.04 K/uL Final    Lymph # 08/26/2022 1.4  1.0 - 4.8 K/uL Final    Mono # 08/26/2022 0.6  0.3 - 1.0 K/uL Final    Eos # 08/26/2022 0.1  0.0 - 0.5 K/uL Final    Baso # 08/26/2022 0.04  0.00 - 0.20 K/uL Final    nRBC 08/26/2022 0  0 /100 WBC Final    Gran % 08/26/2022 66.9   38.0 - 73.0 % Final    Lymph % 08/26/2022 21.8  18.0 - 48.0 % Final    Mono % 08/26/2022 8.3  4.0 - 15.0 % Final    Eosinophil % 08/26/2022 2.1  0.0 - 8.0 % Final    Basophil % 08/26/2022 0.6  0.0 - 1.9 % Final    Differential Method 08/26/2022 Automated   Final    Sodium 08/26/2022 142  136 - 145 mmol/L Final    Potassium 08/26/2022 5.3 (H)  3.5 - 5.1 mmol/L Final    Chloride 08/26/2022 105  95 - 110 mmol/L Final    CO2 08/26/2022 28  23 - 29 mmol/L Final    Glucose 08/26/2022 86  70 - 110 mg/dL Final    BUN 08/26/2022 31 (H)  8 - 23 mg/dL Final    Creatinine 08/26/2022 1.7 (H)  0.5 - 1.4 mg/dL Final    Calcium 08/26/2022 9.2  8.7 - 10.5 mg/dL Final    Total Protein 08/26/2022 6.1  6.0 - 8.4 g/dL Final    Albumin 08/26/2022 3.1 (L)  3.5 - 5.2 g/dL Final    Total Bilirubin 08/26/2022 0.3  0.1 - 1.0 mg/dL Final    Alkaline Phosphatase 08/26/2022 87  55 - 135 U/L Final    AST 08/26/2022 17  10 - 40 U/L Final    ALT 08/26/2022 17  10 - 44 U/L Final    Anion Gap 08/26/2022 9  8 - 16 mmol/L Final    eGFR 08/26/2022 31 (A)  >60 mL/min/1.73 m^2 Final    CRP 08/26/2022 9.8 (H)  0.0 - 8.2 mg/L Final    Hemoglobin A1C 08/26/2022 5.0  4.0 - 5.6 % Final    Estimated Avg Glucose 08/26/2022 97  68 - 131 mg/dL Final    Prealbumin 08/26/2022 24  20 - 43 mg/dL Final    Sed Rate 08/26/2022 10  0 - 20 mm/Hr Final       Past Medical History:   Diagnosis Date    CAD (coronary artery disease)     Cancer     colon    CHF (congestive heart failure)     Colitis     Colon cancer     COPD (chronic obstructive pulmonary disease)     Decreased hearing     Diabetes mellitus     Diabetes mellitus, type 2     Hypercholesterolemia     Hypertension     Hypoxemia     Insomnia     Obesity     Osteoarthritis      Past Surgical History:   Procedure Laterality Date    ANGIOGRAM, CORONARY, WITH LEFT HEART CATHETERIZATION N/A 2/10/2022    Procedure: Angiogram, Coronary, with Left Heart Cath;  Surgeon: Cristian Benjamin MD;  Location: [facility] CATH/EP LAB;   Service: Cardiology;  Laterality: N/A;    CARDIAC CATHETERIZATION      CHOLECYSTECTOMY      COLECTOMY      CORONARY ANGIOPLASTY      CORONARY STENT PLACEMENT      ERCP N/A 1/16/2023    Procedure: ERCP (ENDOSCOPIC RETROGRADE CHOLANGIOPANCREATOGRAPHY);  Surgeon: Biju Kramer III, MD;  Location: [facility];  Service: Endoscopy;  Laterality: N/A;    EXTERNAL EAR SURGERY      EYE SURGERY      FLEXIBLE SIGMOIDOSCOPY N/A 9/19/2019    Procedure: SIGMOIDOSCOPY, FLEXIBLE;  Surgeon: Biju Kramer III, MD;  Location: [facility] [facility] ENDO;  Service: Endoscopy;  Laterality: N/A;    HYSTERECTOMY      partial     Family History   Problem Relation Age of Onset    Febrile seizures Mother     Heart failure Father        Marital Status:   Alcohol History:  reports current alcohol use.  Tobacco History:  reports that she quit smoking about 16 years ago. Her smoking use included cigarettes. She started smoking about 58 years ago. She has a 150.00 pack-year smoking history. She has never used smokeless tobacco.  Drug History:  reports no history of drug use.    Review of patient's allergies indicates:   Allergen Reactions    Iodinated contrast media     Strawberries [strawberry] Hives and Itching       Current Outpatient Medications:     albuterol (VENTOLIN HFA) 90 mcg/actuation inhaler, Inhale 2 puffs into the lungs every 6 (six) hours as needed for Wheezing or Shortness of Breath. Rescue, Disp: 36 g, Rfl: 3    albuterol-ipratropium (DUO-NEB) 2.5 mg-0.5 mg/3 mL nebulizer solution, Take 3 mLs by nebulization every 4 (four) hours as needed for Wheezing or Shortness of Breath. Rescue, Disp: 120 each, Rfl: 6    allopurinoL (ZYLOPRIM) 100 MG tablet, Take 0.5 tablets (50 mg total) by mouth once daily., Disp: 45 tablet, Rfl: 1    amLODIPine (NORVASC) 10 MG tablet, Take 0.5 tablets (5 mg total) by mouth once daily., Disp: 15 tablet, Rfl: 11    aspirin (ECOTRIN) 81 MG EC tablet, Take 1 tablet (81 mg total) by mouth every morning., Disp:  90 tablet, Rfl: 3    atorvastatin (LIPITOR) 40 MG tablet, Take 1 tablet (40 mg total) by mouth once daily., Disp: 30 tablet, Rfl: 0    cholestyramine (QUESTRAN) 4 gram packet, Take 1 packet (4 g total) by mouth 2 (two) times daily., Disp: 180 packet, Rfl: 3    coenzyme Q10 100 mg capsule, Take 100 mg by mouth every morning., Disp: , Rfl:     diltiaZEM (CARDIZEM CD) 120 MG Cp24, Take 1 capsule (120 mg total) by mouth once daily., Disp: 90 capsule, Rfl: 1    ergocalciferol (VITAMIN D2) 50,000 unit Cap, Take 1 capsule (50,000 Units total) by mouth every 7 days., Disp: 12 capsule, Rfl: 1    ferrous sulfate 325 (65 FE) MG EC tablet, Take 325 mg by mouth once daily., Disp: , Rfl:     fish oil-omega-3 fatty acids 300-1,000 mg capsule, Take 2 capsules by mouth every morning., Disp: , Rfl:     furosemide (LASIX) 40 MG tablet, Take 1 tablet (40 mg total) by mouth once daily., Disp: 30 tablet, Rfl: 0    gabapentin (NEURONTIN) 100 MG capsule, Take 1 capsule (100 mg total) by mouth every evening., Disp: 90 capsule, Rfl: 1    hydrALAZINE (APRESOLINE) 50 MG tablet, Take 1 tablet (50 mg total) by mouth 3 (three) times daily., Disp: 90 tablet, Rfl: 0    ipratropium-albuteroL (COMBIVENT RESPIMAT)  mcg/actuation inhaler, Inhale 2 puffs into the lungs every 4 (four) hours as needed for Shortness of Breath. Rescue, Disp: 12 g, Rfl: 3    isosorbide mononitrate (IMDUR) 120 MG 24 hr tablet, Take 1 tablet (120 mg total) by mouth once daily., Disp: 90 tablet, Rfl: 1    levoFLOXacin (LEVAQUIN) 250 MG tablet, Take 1 tablet (250 mg total) by mouth once daily. for 7 days, Disp: 7 tablet, Rfl: 0    metoprolol tartrate (LOPRESSOR) 50 MG tablet, Take 1 tablet (50 mg total) by mouth 3 (three) times daily., Disp: 90 tablet, Rfl: 0    nebulizer and compressor Reshma, as directed, Disp: 1 each, Rfl: 0    nitroGLYCERIN 0.4 MG/DOSE TL SPRY (NITROLINGUAL) 400 mcg/spray spray, Place 1 spray under the tongue every 5 (five) minutes as needed for Chest  pain (max of 3 doses)., Disp: 4.9 g, Rfl: 1    pantoprazole (PROTONIX) 40 MG tablet, Take 1 tablet (40 mg total) by mouth once daily., Disp: 90 tablet, Rfl: 1    polycarbophil (FIBERCON) 625 mg tablet, Take 625 mg by mouth once daily., Disp: , Rfl:     ranolazine (RANEXA) 500 MG Tb12, Take 1 tablet (500 mg total) by mouth 2 (two) times daily., Disp: 180 tablet, Rfl: 1    ticagrelor (BRILINTA) 90 mg tablet, Take 1 tablet (90 mg total) by mouth every 12 (twelve) hours., Disp: 60 tablet, Rfl: 0    vit C/E/Zn/coppr/lutein/zeaxan (PRESERVISION AREDS-2 ORAL), Take 1 tablet by mouth once daily., Disp: , Rfl:     cholecalciferol, vitamin D3, (VITAMIN D3) 125 mcg (5,000 unit) Tab, Take 5,000 Units by mouth once daily., Disp: , Rfl:     fluticasone-salmeterol diskus inhaler 250-50 mcg, Inhale 1 puff into the lungs 2 (two) times daily. (Patient not taking: Reported on 1/24/2023), Disp: 3 each, Rfl: 1    umeclidinium (INCRUSE ELLIPTA) 62.5 mcg/actuation inhalation capsule, Inhale 62.5 mcg into the lungs every morning. Controller (Patient not taking: Reported on 1/24/2023), Disp: 30 each, Rfl: 11    Review of Systems   Constitutional:  Positive for appetite change (reduced appetite) and fatigue. Negative for chills, fever and unexpected weight change.   HENT:  Negative for sore throat and trouble swallowing.    Respiratory:  Positive for cough (chronic) and shortness of breath (worsened over past couple months). Negative for wheezing.    Cardiovascular:  Positive for leg swelling. Negative for chest pain and palpitations.   Gastrointestinal:  Positive for diarrhea. Negative for abdominal pain, blood in stool, constipation, nausea and vomiting.   Genitourinary:  Positive for frequency. Negative for dysuria and hematuria.   Musculoskeletal:  Positive for arthralgias (bilat hip pain) and back pain (chronic LBP).   Skin:  Negative for rash and wound.   Neurological:  Negative for dizziness, syncope, speech difficulty and headaches.  "  Hematological:  Bruises/bleeds easily.      Objective:      Vitals:    01/24/23 1420   BP: (!) 118/58   Pulse: 62   SpO2: 95%   Weight: 83.1 kg (183 lb 3.2 oz)   Height: 5' 3" (1.6 m)     Physical Exam  Constitutional:       General: She is not in acute distress.     Appearance: She is obese.      Comments: Chronically ill Elderly WF sitting in WC, on O2 NC   HENT:      Head: Normocephalic and atraumatic.      Right Ear: Tympanic membrane and ear canal normal.      Left Ear: Tympanic membrane and ear canal normal.      Mouth/Throat:      Mouth: Mucous membranes are moist.   Neck:      Vascular: No carotid bruit.   Cardiovascular:      Rate and Rhythm: Normal rate and regular rhythm.      Heart sounds: No murmur heard.  Pulmonary:      Effort: Pulmonary effort is normal. No respiratory distress.      Breath sounds: No wheezing or rales.      Comments: Breath sounds diminished throughout  Abdominal:      General: There is no distension.      Palpations: Abdomen is soft.      Tenderness: There is no abdominal tenderness.   Genitourinary:     Comments: Rectal exam returned small amount of maroon stool/mucus; no hemorrhoids; chronic excoriation to sacrum  Musculoskeletal:      Cervical back: Neck supple.      Right lower leg: Edema (1-2+ pitting edema bilateral calves from proximal calves to feet. chronic skin changes, no erythema/ulcers/warmth) present.      Left lower leg: Edema present.   Feet:      Right foot:      Skin integrity: Callus and dry skin present. No ulcer or erythema.      Left foot:      Skin integrity: Callus and dry skin present. No ulcer or erythema.   Lymphadenopathy:      Cervical: No cervical adenopathy.   Skin:     General: Skin is warm and dry.   Neurological:      General: No focal deficit present.      Mental Status: She is alert and oriented to person, place, and time.       Assessment:       1. Acute renal failure superimposed on stage 4 chronic kidney disease, unspecified acute renal " failure type    2. NSTEMI (non-ST elevated myocardial infarction)    3. Choledocholithiasis    4. Chronic obstructive pulmonary disease, unspecified COPD type    5. Chronic hypoxemic respiratory failure    6. Anemia due to stage 4 chronic kidney disease         Plan:       Acute renal failure superimposed on stage 4 chronic kidney disease, unspecified acute renal failure type  Comments:  s/p emergent dialysis treatment with CR 3.5 by time of discharge- f/u with Dr. Lopez next month. will have HH check labs next week  Orders:  -     CBC Auto Differential; Future; Expected date: 01/24/2023  -     Comprehensive Metabolic Panel; Future; Expected date: 01/24/2023    NSTEMI (non-ST elevated myocardial infarction)  Comments:  pt denies angina- now on brilinta,  f/u with Dr. Varghese next month as scheduled    Choledocholithiasis  Comments:  resolved s/p ERCP with sphincterotomy    Chronic obstructive pulmonary disease, unspecified COPD type  Comments:  pt reports only using neb treatments now, not on any controller inhalers, f/u with pulm as scheduled    Chronic hypoxemic respiratory failure  Comments:  stable on continuous O2    Anemia due to stage 4 chronic kidney disease  Comments:   to recheck labs next week    Other orders  -     Cancel:  DIABETES FOOT EXAM      Follow up in about 2 months (around 3/24/2023) for COPD.

## 2023-01-24 NOTE — TELEPHONE ENCOUNTER
----- Message from Liz Wiley MA sent at 1/24/2023  3:37 PM CST -----  [phone] per Dr. Dwyer next f/u is for 2 month Pt need a slot  daughter is asking for a call back when the assigned date and time  schedule for her mom

## 2023-01-27 ENCOUNTER — HOSPITAL ENCOUNTER (INPATIENT)
Facility: HOSPITAL | Age: 78
LOS: 6 days | Discharge: NURSING, REHAB OR LONG-TERM CARE | DRG: 193 | End: 2023-02-02
Attending: EMERGENCY MEDICINE | Admitting: INTERNAL MEDICINE
Payer: MEDICARE

## 2023-01-27 DIAGNOSIS — D64.9 ANEMIA, UNSPECIFIED TYPE: ICD-10-CM

## 2023-01-27 DIAGNOSIS — J96.01 ACUTE RESPIRATORY FAILURE WITH HYPOXIA: ICD-10-CM

## 2023-01-27 DIAGNOSIS — J96.00 ACUTE RESPIRATORY FAILURE: ICD-10-CM

## 2023-01-27 DIAGNOSIS — D64.9 NORMOCYTIC ANEMIA: ICD-10-CM

## 2023-01-27 DIAGNOSIS — N18.9 CHRONIC KIDNEY DISEASE, UNSPECIFIED CKD STAGE: ICD-10-CM

## 2023-01-27 DIAGNOSIS — J44.1 COPD EXACERBATION: ICD-10-CM

## 2023-01-27 DIAGNOSIS — I50.9 CONGESTIVE HEART FAILURE, UNSPECIFIED HF CHRONICITY, UNSPECIFIED HEART FAILURE TYPE: ICD-10-CM

## 2023-01-27 DIAGNOSIS — R06.02 SHORTNESS OF BREATH: ICD-10-CM

## 2023-01-27 DIAGNOSIS — J18.9 PNEUMONIA OF RIGHT LOWER LOBE DUE TO INFECTIOUS ORGANISM: ICD-10-CM

## 2023-01-27 DIAGNOSIS — R53.81 DEBILITY: ICD-10-CM

## 2023-01-27 DIAGNOSIS — R06.89 HYPERCAPNIA: Primary | ICD-10-CM

## 2023-01-27 DIAGNOSIS — I50.33 CONGESTIVE HEART FAILURE WITH LEFT VENTRICULAR DIASTOLIC DYSFUNCTION, ACUTE ON CHRONIC: ICD-10-CM

## 2023-01-27 LAB
ALBUMIN SERPL BCP-MCNC: 2.9 G/DL (ref 3.5–5.2)
ALLENS TEST: ABNORMAL
ALP SERPL-CCNC: 57 U/L (ref 55–135)
ALT SERPL W/O P-5'-P-CCNC: 11 U/L (ref 10–44)
ANION GAP SERPL CALC-SCNC: 14 MMOL/L (ref 8–16)
AST SERPL-CCNC: 17 U/L (ref 10–40)
BASOPHILS # BLD AUTO: 0.05 K/UL (ref 0–0.2)
BASOPHILS NFR BLD: 0.4 % (ref 0–1.9)
BILIRUB SERPL-MCNC: 0.8 MG/DL (ref 0.1–1)
BILIRUB UR QL STRIP: NEGATIVE
BNP SERPL-MCNC: 1106 PG/ML (ref 0–99)
BUN SERPL-MCNC: 51 MG/DL (ref 8–23)
CALCIUM SERPL-MCNC: 8.2 MG/DL (ref 8.7–10.5)
CHLORIDE SERPL-SCNC: 104 MMOL/L (ref 95–110)
CLARITY UR: CLEAR
CO2 SERPL-SCNC: 22 MMOL/L (ref 23–29)
COLOR UR: YELLOW
CREAT SERPL-MCNC: 5.5 MG/DL (ref 0.5–1.4)
CREAT UR-MCNC: 60 MG/DL (ref 15–325)
DELSYS: ABNORMAL
DIFFERENTIAL METHOD: ABNORMAL
EOSINOPHIL # BLD AUTO: 0.1 K/UL (ref 0–0.5)
EOSINOPHIL NFR BLD: 1 % (ref 0–8)
EP: 8
ERYTHROCYTE [DISTWIDTH] IN BLOOD BY AUTOMATED COUNT: 14.2 % (ref 11.5–14.5)
ERYTHROCYTE [SEDIMENTATION RATE] IN BLOOD BY WESTERGREN METHOD: 20 MM/H
EST. GFR  (NO RACE VARIABLE): 7.6 ML/MIN/1.73 M^2
FIO2: 100
GLUCOSE SERPL-MCNC: 119 MG/DL (ref 70–110)
GLUCOSE SERPL-MCNC: 123 MG/DL (ref 70–110)
GLUCOSE UR QL STRIP: NEGATIVE
HCO3 UR-SCNC: 22 MMOL/L (ref 24–28)
HCT VFR BLD AUTO: 27.5 % (ref 37–48.5)
HCT VFR BLD CALC: 26 %PCV (ref 36–54)
HGB BLD-MCNC: 8.2 G/DL (ref 12–16)
HGB UR QL STRIP: NEGATIVE
IMM GRANULOCYTES # BLD AUTO: 0.1 K/UL (ref 0–0.04)
IMM GRANULOCYTES NFR BLD AUTO: 0.9 % (ref 0–0.5)
IP: 16
KETONES UR QL STRIP: NEGATIVE
LACTATE SERPL-SCNC: 1 MMOL/L (ref 0.5–1.9)
LEUKOCYTE ESTERASE UR QL STRIP: NEGATIVE
LYMPHOCYTES # BLD AUTO: 1.8 K/UL (ref 1–4.8)
LYMPHOCYTES NFR BLD: 15.4 % (ref 18–48)
MCH RBC QN AUTO: 29.8 PG (ref 27–31)
MCHC RBC AUTO-ENTMCNC: 29.8 G/DL (ref 32–36)
MCV RBC AUTO: 100 FL (ref 82–98)
MIN VOL: 9.3
MODE: ABNORMAL
MONOCYTES # BLD AUTO: 0.5 K/UL (ref 0.3–1)
MONOCYTES NFR BLD: 4.4 % (ref 4–15)
NEUTROPHILS # BLD AUTO: 9 K/UL (ref 1.8–7.7)
NEUTROPHILS NFR BLD: 77.9 % (ref 38–73)
NITRITE UR QL STRIP: NEGATIVE
NRBC BLD-RTO: 0 /100 WBC
PCO2 BLDA: 52.1 MMHG (ref 35–45)
PH SMN: 7.23 [PH] (ref 7.35–7.45)
PH UR STRIP: 5 [PH] (ref 5–8)
PLATELET # BLD AUTO: 275 K/UL (ref 150–450)
PMV BLD AUTO: 12.2 FL (ref 9.2–12.9)
PO2 BLDA: 271 MMHG (ref 80–100)
POC BE: -5 MMOL/L
POC IONIZED CALCIUM: 1.2 MMOL/L (ref 1.06–1.42)
POC SATURATED O2: 100 % (ref 95–100)
POC TCO2: 24 MMOL/L (ref 23–27)
POTASSIUM BLD-SCNC: 4.6 MMOL/L (ref 3.5–5.1)
POTASSIUM SERPL-SCNC: 4.7 MMOL/L (ref 3.5–5.1)
PROCALCITONIN SERPL IA-MCNC: 0.11 NG/ML (ref 0–0.5)
PROT SERPL-MCNC: 6.1 G/DL (ref 6–8.4)
PROT UR QL STRIP: ABNORMAL
PROT UR-MCNC: 25 MG/DL (ref 6–15)
RBC # BLD AUTO: 2.75 M/UL (ref 4–5.4)
SAMPLE: ABNORMAL
SITE: ABNORMAL
SODIUM BLD-SCNC: 141 MMOL/L (ref 136–145)
SODIUM SERPL-SCNC: 140 MMOL/L (ref 136–145)
SP GR UR STRIP: 1.01 (ref 1–1.03)
SPONT RATE: 22
TROPONIN I SERPL HS-MCNC: 142.7 PG/ML (ref 0–14.9)
URN SPEC COLLECT METH UR: ABNORMAL
UROBILINOGEN UR STRIP-ACNC: NEGATIVE EU/DL
WBC # BLD AUTO: 11.54 K/UL (ref 3.9–12.7)

## 2023-01-27 PROCEDURE — 84145 PROCALCITONIN (PCT): CPT | Performed by: EMERGENCY MEDICINE

## 2023-01-27 PROCEDURE — 83880 ASSAY OF NATRIURETIC PEPTIDE: CPT | Performed by: EMERGENCY MEDICINE

## 2023-01-27 PROCEDURE — 84156 ASSAY OF PROTEIN URINE: CPT | Performed by: INTERNAL MEDICINE

## 2023-01-27 PROCEDURE — 99900035 HC TECH TIME PER 15 MIN (STAT)

## 2023-01-27 PROCEDURE — 84166 PROTEIN E-PHORESIS/URINE/CSF: CPT | Performed by: INTERNAL MEDICINE

## 2023-01-27 PROCEDURE — 93005 ELECTROCARDIOGRAM TRACING: CPT | Performed by: INTERNAL MEDICINE

## 2023-01-27 PROCEDURE — 80053 COMPREHEN METABOLIC PANEL: CPT | Performed by: EMERGENCY MEDICINE

## 2023-01-27 PROCEDURE — 94660 CPAP INITIATION&MGMT: CPT

## 2023-01-27 PROCEDURE — 27000221 HC OXYGEN, UP TO 24 HOURS

## 2023-01-27 PROCEDURE — 84484 ASSAY OF TROPONIN QUANT: CPT | Performed by: EMERGENCY MEDICINE

## 2023-01-27 PROCEDURE — 85014 HEMATOCRIT: CPT

## 2023-01-27 PROCEDURE — 84295 ASSAY OF SERUM SODIUM: CPT

## 2023-01-27 PROCEDURE — 96375 TX/PRO/DX INJ NEW DRUG ADDON: CPT

## 2023-01-27 PROCEDURE — 63600175 PHARM REV CODE 636 W HCPCS: Performed by: INTERNAL MEDICINE

## 2023-01-27 PROCEDURE — 93010 EKG 12-LEAD: ICD-10-PCS | Mod: ,,, | Performed by: INTERNAL MEDICINE

## 2023-01-27 PROCEDURE — 82330 ASSAY OF CALCIUM: CPT

## 2023-01-27 PROCEDURE — 99900031 HC PATIENT EDUCATION (STAT)

## 2023-01-27 PROCEDURE — 84156 ASSAY OF PROTEIN URINE: CPT | Mod: 91 | Performed by: INTERNAL MEDICINE

## 2023-01-27 PROCEDURE — 93010 ELECTROCARDIOGRAM REPORT: CPT | Mod: ,,, | Performed by: INTERNAL MEDICINE

## 2023-01-27 PROCEDURE — 25000003 PHARM REV CODE 250: Performed by: INTERNAL MEDICINE

## 2023-01-27 PROCEDURE — 87040 BLOOD CULTURE FOR BACTERIA: CPT | Mod: 59 | Performed by: EMERGENCY MEDICINE

## 2023-01-27 PROCEDURE — 81003 URINALYSIS AUTO W/O SCOPE: CPT | Performed by: EMERGENCY MEDICINE

## 2023-01-27 PROCEDURE — 83605 ASSAY OF LACTIC ACID: CPT | Performed by: EMERGENCY MEDICINE

## 2023-01-27 PROCEDURE — 85025 COMPLETE CBC W/AUTO DIFF WBC: CPT | Performed by: EMERGENCY MEDICINE

## 2023-01-27 PROCEDURE — 82570 ASSAY OF URINE CREATININE: CPT | Performed by: INTERNAL MEDICINE

## 2023-01-27 PROCEDURE — 82803 BLOOD GASES ANY COMBINATION: CPT

## 2023-01-27 PROCEDURE — 63600175 PHARM REV CODE 636 W HCPCS: Performed by: EMERGENCY MEDICINE

## 2023-01-27 PROCEDURE — 99291 CRITICAL CARE FIRST HOUR: CPT

## 2023-01-27 PROCEDURE — 96365 THER/PROPH/DIAG IV INF INIT: CPT

## 2023-01-27 PROCEDURE — 94799 UNLISTED PULMONARY SVC/PX: CPT

## 2023-01-27 PROCEDURE — 21000000 HC CCU ICU ROOM CHARGE

## 2023-01-27 PROCEDURE — 84132 ASSAY OF SERUM POTASSIUM: CPT

## 2023-01-27 PROCEDURE — 94761 N-INVAS EAR/PLS OXIMETRY MLT: CPT

## 2023-01-27 PROCEDURE — 36600 WITHDRAWAL OF ARTERIAL BLOOD: CPT

## 2023-01-27 RX ORDER — CHOLESTYRAMINE 4 G/4.8G
1 POWDER, FOR SUSPENSION ORAL 2 TIMES DAILY
Status: DISCONTINUED | OUTPATIENT
Start: 2023-01-27 | End: 2023-02-02

## 2023-01-27 RX ORDER — LEVOFLOXACIN 5 MG/ML
500 INJECTION, SOLUTION INTRAVENOUS
Status: DISCONTINUED | OUTPATIENT
Start: 2023-01-27 | End: 2023-01-27

## 2023-01-27 RX ORDER — LEVOFLOXACIN 5 MG/ML
500 INJECTION, SOLUTION INTRAVENOUS
Status: DISCONTINUED | OUTPATIENT
Start: 2023-01-29 | End: 2023-02-02

## 2023-01-27 RX ORDER — AMLODIPINE BESYLATE 5 MG/1
5 TABLET ORAL DAILY
Status: DISCONTINUED | OUTPATIENT
Start: 2023-01-27 | End: 2023-02-02

## 2023-01-27 RX ORDER — ACETAMINOPHEN 325 MG/1
650 TABLET ORAL EVERY 4 HOURS PRN
Status: DISCONTINUED | OUTPATIENT
Start: 2023-01-27 | End: 2023-02-02 | Stop reason: HOSPADM

## 2023-01-27 RX ORDER — FUROSEMIDE 10 MG/ML
40 INJECTION INTRAMUSCULAR; INTRAVENOUS DAILY
Status: DISCONTINUED | OUTPATIENT
Start: 2023-01-27 | End: 2023-02-01

## 2023-01-27 RX ORDER — IPRATROPIUM BROMIDE AND ALBUTEROL SULFATE 2.5; .5 MG/3ML; MG/3ML
3 SOLUTION RESPIRATORY (INHALATION) EVERY 4 HOURS PRN
Status: DISCONTINUED | OUTPATIENT
Start: 2023-01-27 | End: 2023-02-02 | Stop reason: HOSPADM

## 2023-01-27 RX ORDER — METHYLPREDNISOLONE SOD SUCC 125 MG
125 VIAL (EA) INJECTION
Status: COMPLETED | OUTPATIENT
Start: 2023-01-27 | End: 2023-01-27

## 2023-01-27 RX ORDER — METOPROLOL TARTRATE 50 MG/1
50 TABLET ORAL 3 TIMES DAILY
Status: DISCONTINUED | OUTPATIENT
Start: 2023-01-27 | End: 2023-02-02 | Stop reason: HOSPADM

## 2023-01-27 RX ORDER — ATORVASTATIN CALCIUM 40 MG/1
40 TABLET, FILM COATED ORAL DAILY
Status: DISCONTINUED | OUTPATIENT
Start: 2023-01-27 | End: 2023-02-02 | Stop reason: HOSPADM

## 2023-01-27 RX ORDER — ASPIRIN 81 MG/1
81 TABLET ORAL EVERY MORNING
Status: DISCONTINUED | OUTPATIENT
Start: 2023-01-27 | End: 2023-02-02 | Stop reason: HOSPADM

## 2023-01-27 RX ORDER — ENOXAPARIN SODIUM 100 MG/ML
30 INJECTION SUBCUTANEOUS EVERY 24 HOURS
Status: DISCONTINUED | OUTPATIENT
Start: 2023-01-27 | End: 2023-01-28

## 2023-01-27 RX ORDER — CEFEPIME HYDROCHLORIDE 1 G/50ML
1 INJECTION, SOLUTION INTRAVENOUS
Status: DISCONTINUED | OUTPATIENT
Start: 2023-01-28 | End: 2023-01-27

## 2023-01-27 RX ORDER — FUROSEMIDE 10 MG/ML
40 INJECTION INTRAMUSCULAR; INTRAVENOUS
Status: COMPLETED | OUTPATIENT
Start: 2023-01-27 | End: 2023-01-27

## 2023-01-27 RX ORDER — ISOSORBIDE MONONITRATE 60 MG/1
120 TABLET, EXTENDED RELEASE ORAL DAILY
Status: DISCONTINUED | OUTPATIENT
Start: 2023-01-27 | End: 2023-02-02 | Stop reason: HOSPADM

## 2023-01-27 RX ORDER — HYDRALAZINE HYDROCHLORIDE 25 MG/1
50 TABLET, FILM COATED ORAL 3 TIMES DAILY
Status: DISCONTINUED | OUTPATIENT
Start: 2023-01-27 | End: 2023-02-02

## 2023-01-27 RX ORDER — LEVOFLOXACIN 5 MG/ML
750 INJECTION, SOLUTION INTRAVENOUS
Status: COMPLETED | OUTPATIENT
Start: 2023-01-27 | End: 2023-01-27

## 2023-01-27 RX ORDER — PANTOPRAZOLE SODIUM 40 MG/1
40 TABLET, DELAYED RELEASE ORAL DAILY
Status: DISCONTINUED | OUTPATIENT
Start: 2023-01-27 | End: 2023-01-29

## 2023-01-27 RX ORDER — CEFEPIME HYDROCHLORIDE 1 G/50ML
1 INJECTION, SOLUTION INTRAVENOUS
Status: DISCONTINUED | OUTPATIENT
Start: 2023-01-27 | End: 2023-01-27

## 2023-01-27 RX ORDER — DILTIAZEM HYDROCHLORIDE 120 MG/1
120 CAPSULE, COATED, EXTENDED RELEASE ORAL DAILY
Status: DISCONTINUED | OUTPATIENT
Start: 2023-01-27 | End: 2023-02-02 | Stop reason: HOSPADM

## 2023-01-27 RX ADMIN — ATORVASTATIN CALCIUM 40 MG: 40 TABLET, FILM COATED ORAL at 03:01

## 2023-01-27 RX ADMIN — ISOSORBIDE MONONITRATE 120 MG: 60 TABLET, EXTENDED RELEASE ORAL at 03:01

## 2023-01-27 RX ADMIN — ENOXAPARIN SODIUM 30 MG: 30 INJECTION SUBCUTANEOUS at 05:01

## 2023-01-27 RX ADMIN — ASPIRIN 81 MG: 81 TABLET, COATED ORAL at 03:01

## 2023-01-27 RX ADMIN — METOPROLOL TARTRATE 50 MG: 50 TABLET, FILM COATED ORAL at 03:01

## 2023-01-27 RX ADMIN — METHYLPREDNISOLONE SODIUM SUCCINATE 125 MG: 125 INJECTION, POWDER, FOR SOLUTION INTRAMUSCULAR; INTRAVENOUS at 07:01

## 2023-01-27 RX ADMIN — AMLODIPINE BESYLATE 5 MG: 5 TABLET ORAL at 03:01

## 2023-01-27 RX ADMIN — FUROSEMIDE 40 MG: 10 INJECTION, SOLUTION INTRAVENOUS at 07:01

## 2023-01-27 RX ADMIN — DILTIAZEM HYDROCHLORIDE 120 MG: 120 CAPSULE, COATED, EXTENDED RELEASE ORAL at 03:01

## 2023-01-27 RX ADMIN — LEVOFLOXACIN 750 MG: 750 INJECTION, SOLUTION INTRAVENOUS at 08:01

## 2023-01-27 RX ADMIN — CEFEPIME HYDROCHLORIDE 1 G: 1 INJECTION, SOLUTION INTRAVENOUS at 11:01

## 2023-01-27 RX ADMIN — PANTOPRAZOLE SODIUM 40 MG: 40 TABLET, DELAYED RELEASE ORAL at 03:01

## 2023-01-27 RX ADMIN — HYDRALAZINE HYDROCHLORIDE 50 MG: 25 TABLET ORAL at 03:01

## 2023-01-27 RX ADMIN — FUROSEMIDE 40 MG: 10 INJECTION, SOLUTION INTRAMUSCULAR; INTRAVENOUS at 03:01

## 2023-01-27 NOTE — HPI
77 year old patient getting admitted with acute on chronic respiratory failure due to R sided Pneumonia and possible acute CHF flare  Pt was in this hospital very recently and went home with Lasix as per instructions from Nephrology MD   She started having Cough and shortness of breath 2 days ago  Today symptoms went worse and she came to ER , was put on BIPAP and got admitted  She was also found to be on ISSA with CKD

## 2023-01-27 NOTE — ASSESSMENT & PLAN NOTE
Significant coronary disease with blockages in the right coronary and circumflex mid LAD (per recent angiogram;coronary)  Recently had subendocardial ischemia and associated chest pain on 1/17  Medical management at the moment

## 2023-01-27 NOTE — PROGRESS NOTES
Pharmacist Renal Dose Adjustment Note    Marisol Kerr is a 77 y.o. female being treated with the medication Levofloxacin    Patient Data:    Vital Signs (Most Recent):  Temp: 97.8 °F (36.6 °C) (01/27/23 0815)  Pulse: 93 (01/27/23 1232)  Resp: (!) 21 (01/27/23 1202)  BP: 131/61 (01/27/23 1232)  SpO2: 100 % (01/27/23 1232)   Vital Signs (72h Range):  Temp:  [97.7 °F (36.5 °C)-97.8 °F (36.6 °C)]   Pulse:  [83-93]   Resp:  [18-27]   BP: (121-149)/(52-75)   SpO2:  [97 %-100 %]      Recent Labs   Lab 01/27/23  0728   CREATININE 5.5*     Serum creatinine: 5.5 mg/dL (H) 01/27/23 0728  Estimated creatinine clearance: 9 mL/min (A)    Medication:Levofloxacin dose: 500 mg frequency Q24H will be changed to medication:Levofloxacin dose:500 mg frequency:Q48H    Pharmacist's Name: Ashtyn Madrid  Pharmacist's Extension: [phone]

## 2023-01-27 NOTE — PROGRESS NOTES
Pharmacist Renal Dose Adjustment Note    Marisol Kerr is a 77 y.o. female being treated with the medication Cefepime 1 G IV Q8H    Patient Data:    Vital Signs (Most Recent):  Temp: 97.8 °F (36.6 °C) (01/27/23 0815)  Pulse: 85 (01/27/23 0931)  Resp: (!) 26 (01/27/23 0931)  BP: (!) 140/55 (01/27/23 0931)  SpO2: 100 % (01/27/23 0931)   Vital Signs (72h Range):  Temp:  [97.7 °F (36.5 °C)-97.8 °F (36.6 °C)]   Pulse:  [83-91]   Resp:  [20-27]   BP: (121-140)/(52-75)   SpO2:  [97 %-100 %]      Recent Labs   Lab 01/27/23  0728   CREATININE 5.5*     Serum creatinine: 5.5 mg/dL (H) 01/27/23 0728  Estimated creatinine clearance: 9 mL/min (A)    Medication:Cefepime dose: 1G frequency Q8H will be changed to medication:Cefepime dose:1G frequency:Q24H    Pharmacist's Name: Ashtyn Madrid  Pharmacist's Extension: [phone]

## 2023-01-27 NOTE — CARE UPDATE
01/27/23 0725   Patient Assessment/Suction   Level of Consciousness (AVPU) alert   Respiratory Effort Mild   Expansion/Accessory Muscles/Retractions expansion symmetric   WILMER Breath Sounds clear   LLL Breath Sounds diminished   RUL Breath Sounds clear   RLL Breath Sounds diminished   Rhythm/Pattern, Respiratory labored   PRE-TX-O2   Device (Oxygen Therapy) BIPAP   Oxygen Concentration (%) 100   SpO2 100 %   Pulse 91   Resp 20   Ready to Wean/Extubation Screen   FIO2<=50 (chart decimal) (!) 1   Preset CPAP/BiPAP Settings   Mode Of Delivery BiPAP   Ipap 16   EPAP (cm H2O) 8   Pressure Support (cm H2O) 8   Set Rate (Breaths/Min) 20   ITime (sec) 1   Rise Time (sec) 3   Code Blue/Rapid Response   $ Was an ABG obtained? Arterial Puncture;ISTAT - Blood gas;ISTAT - Calcium;ISTAT - Hematocrit;ISTAT - Potassium;ISTAT - Sodium   Blood Gas Puncture   Blood Gas Type arterial   Arterial Site right;radial artery   Collateral Circulation Verified John's Test   Site Preparation alcohol   Pressure Held yes   Oxygen Amount FiO2 (specify)  (100)   Number of Attempts for ABG? 1   Attempted By? KDAO RRT   Labs   $ Labs Tech Time 15 min   Critical Value Communication   Date Result Received 01/27/23   Time Result Received 0725   Resulting Department of Critical Value RESP   Who communicated critical value from resulting department? KDAO RRT   Critical Test #1 PH   Critical Test #1 Result 7.234   Critical Test #2 PCO2   Critical Test #2 Result 52.1   Critical Test #3  BE   Critical Test #3 Result -5   Name of Notified Physician/Designee DR. SIMPSON   Date Notified 01/27/23   Time Notified 0725   Physician Directive   (increase RR)   Respiratory Evaluation   $ Care Plan Tech Time 15 min

## 2023-01-27 NOTE — ASSESSMENT & PLAN NOTE
Body mass index is 32.42 kg/m². Morbid obesity complicates all aspects of disease management from diagnostic modalities to treatment.

## 2023-01-27 NOTE — ED PROVIDER NOTES
Encounter Date: 1/27/2023       History     Chief Complaint   Patient presents with    Shortness of Breath     77-year-old female presents emergency room with complaints of having severe shortness of breath that began 30 minutes prior to arrival.  The patient was transported to the ED by EMS.  She was placed on CPAP EN route.  The patient is normally on home O2 at 5 L and noted to have had an oxygen saturation of 80% at home.  Patient apparently lives at home alone and she apparently called EMS.  Patient has a prior history of CHF, COPD, colon cancer, coronary artery disease.  There were no reports of her complaining of chest pain or having any nausea vomiting.  The patient is in such respiratory extremis little other history is obtainable.    Review of patient's allergies indicates:   Allergen Reactions    Iodinated contrast media     Strawberries [strawberry] Hives and Itching     Past Medical History:   Diagnosis Date    CAD (coronary artery disease)     Cancer     colon    CHF (congestive heart failure)     Colitis     Colon cancer     COPD (chronic obstructive pulmonary disease)     Decreased hearing     Diabetes mellitus     Diabetes mellitus, type 2     Hypercholesterolemia     Hypertension     Hypoxemia     Insomnia     Obesity     Osteoarthritis      Past Surgical History:   Procedure Laterality Date    ANGIOGRAM, CORONARY, WITH LEFT HEART CATHETERIZATION N/A 2/10/2022    Procedure: Angiogram, Coronary, with Left Heart Cath;  Surgeon: Cristian Benjamin MD;  Location: [facility] CATH/EP LAB;  Service: Cardiology;  Laterality: N/A;    CARDIAC CATHETERIZATION      CHOLECYSTECTOMY      COLECTOMY      CORONARY ANGIOPLASTY      CORONARY STENT PLACEMENT      ERCP N/A 1/16/2023    Procedure: ERCP (ENDOSCOPIC RETROGRADE CHOLANGIOPANCREATOGRAPHY);  Surgeon: Biju Kramer III, MD;  Location: [facility] ENDO;  Service: Endoscopy;  Laterality: N/A;    EXTERNAL EAR SURGERY      EYE SURGERY       FLEXIBLE SIGMOIDOSCOPY N/A 2019    Procedure: SIGMOIDOSCOPY, FLEXIBLE;  Surgeon: Biju Kramer III, MD;  Location: [facility];  Service: Endoscopy;  Laterality: N/A;    HYSTERECTOMY      partial     Family History   Problem Relation Age of Onset    Febrile seizures Mother     Heart failure Father      Social History     Tobacco Use    Smoking status: Former     Packs/day: 3.00     Years: 50.00     Pack years: 150.00     Types: Cigarettes     Start date: 3/30/1964     Quit date: 2006     Years since quittin.9    Smokeless tobacco: Never    Tobacco comments:     ex smoker 15 years   Substance Use Topics    Alcohol use: Yes    Drug use: No     Review of Systems   Unable to perform ROS: Severe respiratory distress     Physical Exam     Initial Vitals   BP Pulse Resp Temp SpO2   23 0717 23 0717 23 0717 23 0717 23 0712   137/75 91 (!) 24 97.7 °F (36.5 °C) 100 %      MAP       --                Physical Exam    Vitals reviewed.  Constitutional: She appears well-developed and well-nourished. She is not diaphoretic. She appears distressed.   Obese, acute respiratory distress   HENT:   Head: Normocephalic and atraumatic.   Nose: Nose normal.   Mouth/Throat: No oropharyngeal exudate.   Eyes: Conjunctivae are normal. Pupils are equal, round, and reactive to light.   Neck: Neck supple. No JVD present.   Normal range of motion.  Cardiovascular:  Normal rate, regular rhythm, normal heart sounds and intact distal pulses.     Exam reveals no gallop and no friction rub.       No murmur heard.  Pulmonary/Chest: No respiratory distress. She has no wheezes. She has no rhonchi. She has rales. She exhibits no tenderness.   Abdominal: Abdomen is soft. Bowel sounds are normal. no abdominal tenderness   Obese, bowel sounds present but diminished There is no rebound and no guarding.   Musculoskeletal:         General: Edema present. No tenderness. Normal range of motion.      Cervical  back: Normal range of motion and neck supple.      Comments: 3+ pretibial and pedal edema bilaterally     Lymphadenopathy:     She has no cervical adenopathy.   Neurological: She is alert and oriented to person, place, and time. She has normal strength. GCS score is 15. GCS eye subscore is 4. GCS verbal subscore is 5. GCS motor subscore is 6.   Skin: Skin is warm and dry. Capillary refill takes less than 2 seconds. No rash noted. No erythema. No pallor.   Psychiatric: She has a normal mood and affect. Her behavior is normal. Judgment and thought content normal.       ED Course   Critical Care    Date/Time: 1/27/2023 11:25 AM  Performed by: Wiley Conrad Jr., MD  Authorized by: Dean Vanessa MD   Direct patient critical care time: 30 minutes  Ordering / reviewing critical care time: 10 minutes  Documentation critical care time: 5 minutes  Consulting other physicians critical care time: 5 minutes  Total critical care time (exclusive of procedural time) : 50 minutes  Critical care was time spent personally by me on the following activities: discussions with consultants, evaluation of patient's response to treatment, examination of patient, obtaining history from patient or surrogate, ordering and performing treatments and interventions, ordering and review of laboratory studies, ordering and review of radiographic studies, pulse oximetry and re-evaluation of patient's condition.      Labs Reviewed   CBC W/ AUTO DIFFERENTIAL - Abnormal; Notable for the following components:       Result Value    RBC 2.75 (*)     Hemoglobin 8.2 (*)     Hematocrit 27.5 (*)      (*)     MCHC 29.8 (*)     Immature Granulocytes 0.9 (*)     Gran # (ANC) 9.0 (*)     Immature Grans (Abs) 0.10 (*)     Gran % 77.9 (*)     Lymph % 15.4 (*)     All other components within normal limits   COMPREHENSIVE METABOLIC PANEL - Abnormal; Notable for the following components:    CO2 22 (*)     Glucose 123 (*)     BUN 51 (*)     Creatinine 5.5 (*)      Calcium 8.2 (*)     Albumin 2.9 (*)     eGFR 7.6 (*)     All other components within normal limits   TROPONIN I HIGH SENSITIVITY - Abnormal; Notable for the following components:    Troponin I High Sensitivity 142.7 (*)     All other components within normal limits    Narrative:      Trop[facility] critical result(s) called and verbal readback obtained from   Shelia Souza RN by HS3 01/27/2023 09:29   B-TYPE NATRIURETIC PEPTIDE - Abnormal; Notable for the following components:    BNP 1,106 (*)     All other components within normal limits   URINALYSIS, REFLEX TO URINE CULTURE - Abnormal; Notable for the following components:    Protein, UA Trace (*)     All other components within normal limits    Narrative:     Specimen Source->Urine   ISTAT PROCEDURE - Abnormal; Notable for the following components:    POC PH 7.234 (*)     POC PCO2 52.1 (*)     POC PO2 271 (*)     POC HCO3 22.0 (*)     POC Glucose 119 (*)     POC Hematocrit 26 (*)     All other components within normal limits   CULTURE, BLOOD   CULTURE, BLOOD   PROCALCITONIN   LACTIC ACID, PLASMA        ECG Results              EKG 12-lead (In process)  Result time 01/27/23 07:27:02      In process by Interface, Lab In [facility] (01/27/23 07:27:02)                   Narrative:    Test Reason : R06.02,    Vent. Rate : 091 BPM     Atrial Rate : 092 BPM     P-R Int : 000 ms          QRS Dur : 106 ms      QT Int : 406 ms       P-R-T Axes : 000 053 040 degrees     QTc Int : 499 ms    Accelerated Junctional rhythm  Nonspecific ST and T wave abnormality  Prolonged QT  Abnormal ECG  When compared with ECG of 27-JAN-2023 07:09,  Junctional rhythm has replaced Sinus rhythm  ST less depressed in Lateral leads  T wave inversion less evident in Lateral leads    Referred By: [email or web address]   SELF           Confirmed By:                                   Imaging Results              X-Ray Chest AP Portable (Final result)  Result time 01/27/23 07:32:41      Final result by Flavio Lopez  MD (01/27/23 07:32:41)                   Narrative:    Reason: CHF Shortness of breath    FINDINGS:  Portable chest at 712 compared with 1/17/2023 shows unchanged right subclavian central venous catheter tip near peripheral SVC. Previous right IJ catheter has been removed. Cardiomediastinal silhouette is within normal limits, given portable technique.    Patchy alveolar opacities are new in the right mid and lower lung zone. Blunting of the lateral right costophrenic angle suggests tiny right pleural effusion, new. Left lung remains clear. Pulmonary vasculature centrally is within normal limits. No pneumothorax.    No acute osseous abnormality.    IMPRESSION:  New patchy right mid and lower lung zone alveolar opacities with tiny right pleural effusion. Alveolar consolidation secondary to pneumonia is favored, although pulmonary edema could give a similar appearance.    Electronically signed by:  Flavio Lopez MD  1/27/2023 7:32 AM CST Workstation: [id number]                                     Medications   cefepime in dextrose 5 % 1 gram/50 mL IVPB 1 g (has no administration in time range)   furosemide injection 40 mg (40 mg Intravenous Given 1/27/23 0733)   methylPREDNISolone sodium succinate injection 125 mg (125 mg Intravenous Given 1/27/23 0733)   levoFLOXacin 750 mg/150 mL IVPB 750 mg (750 mg Intravenous New Bag 1/27/23 0812)                Attending Attestation:             Attending ED Notes:   This 77-year-old female has a history of COPD and CHF who is brought in in respiratory extremis by EMS from her residence was placed on BiPAP after being in the ED and had improvement in symptoms.  The patient's settings were 100% and 16/8.  Chest X showed a right pleural effusion but additionally an infiltrate in right base concerning for pneumonia.  The screening labs obtained showed a H&H was low at 8.2 and 27.5 which is little lower than it had previously been.  The BUN and creatinine is also elevated with a BUN  of 51 and creatinine of 5.5.  Troponin was initially 143 in the 2nd troponin is pending.  BNP is elevated at 1106.  The patient's EKG showed a sinus rhythm with some lateral ischemia.  Patient's ABGs after being on BiPAP showed a pH of 7.23.  PCO2 52 and a PO2 to 71 bicarb is 22.    During ED course the patient was given 40 mg of Lasix IV, Solu-Medrol 125 mg IV and maintained on BiPAP.  She had a Montes catheter established.  The patient had gradual and significant improvement in symptoms was able to rest much more comfortably on BiPAP.  Hospital Medicine was consulted for admission to  telemetry [facility] and Dr. Otf prasad be the admitting physician                 Clinical Impression:   Final diagnoses:  [R06.02] Shortness of breath  [J96.00] Acute respiratory failure  [R06.89] Hypercapnia (Primary)  [N18.9] Chronic kidney disease, unspecified CKD stage  [D64.9] Anemia, unspecified type  [I50.9] Congestive heart failure, unspecified HF chronicity, unspecified heart failure type  [J18.9] Pneumonia of right lower lobe due to infectious organism        ED Disposition Condition    Admit                 Wiley Conrad Jr., MD  01/27/23 1116       Wiley Conrad Jr., MD  01/27/23 3209

## 2023-01-27 NOTE — SUBJECTIVE & OBJECTIVE
Past Medical History:   Diagnosis Date    CAD (coronary artery disease)     Cancer     colon    CHF (congestive heart failure)     Colitis     Colon cancer     COPD (chronic obstructive pulmonary disease)     Decreased hearing     Diabetes mellitus     Diabetes mellitus, type 2     Hypercholesterolemia     Hypertension     Hypoxemia     Insomnia     Obesity     Osteoarthritis        Past Surgical History:   Procedure Laterality Date    ANGIOGRAM, CORONARY, WITH LEFT HEART CATHETERIZATION N/A 2/10/2022    Procedure: Angiogram, Coronary, with Left Heart Cath;  Surgeon: Cristian Benjamin MD;  Location: [facility] CATH/EP LAB;  Service: Cardiology;  Laterality: N/A;    CARDIAC CATHETERIZATION      CHOLECYSTECTOMY      COLECTOMY      CORONARY ANGIOPLASTY      CORONARY STENT PLACEMENT      ERCP N/A 1/16/2023    Procedure: ERCP (ENDOSCOPIC RETROGRADE CHOLANGIOPANCREATOGRAPHY);  Surgeon: Biju Kramer III, MD;  Location: [facility] ENDO;  Service: Endoscopy;  Laterality: N/A;    EXTERNAL EAR SURGERY      EYE SURGERY      FLEXIBLE SIGMOIDOSCOPY N/A 9/19/2019    Procedure: SIGMOIDOSCOPY, FLEXIBLE;  Surgeon: Biju Kramer III, MD;  Location: [facility] ENDO;  Service: Endoscopy;  Laterality: N/A;    HYSTERECTOMY      partial       Review of patient's allergies indicates:   Allergen Reactions    Iodinated contrast media     Strawberries [strawberry] Hives and Itching       No current facility-administered medications on file prior to encounter.     Current Outpatient Medications on File Prior to Encounter   Medication Sig    albuterol (VENTOLIN HFA) 90 mcg/actuation inhaler Inhale 2 puffs into the lungs every 6 (six) hours as needed for Wheezing or Shortness of Breath. Rescue    albuterol-ipratropium (DUO-NEB) 2.5 mg-0.5 mg/3 mL nebulizer solution Take 3 mLs by nebulization every 4 (four) hours as needed for Wheezing or Shortness of Breath. Rescue    allopurinoL (ZYLOPRIM) 100 MG tablet Take 0.5 tablets (50 mg total) by mouth once  daily.    amLODIPine (NORVASC) 10 MG tablet Take 0.5 tablets (5 mg total) by mouth once daily.    aspirin (ECOTRIN) 81 MG EC tablet Take 1 tablet (81 mg total) by mouth every morning.    atorvastatin (LIPITOR) 40 MG tablet Take 1 tablet (40 mg total) by mouth once daily.    cholecalciferol, vitamin D3, 125 mcg (5,000 unit) Tab Take 5,000 Units by mouth once daily.    coenzyme Q10 100 mg capsule Take 100 mg by mouth every morning.    diltiaZEM (CARDIZEM CD) 120 MG Cp24 Take 1 capsule (120 mg total) by mouth once daily.    ergocalciferol (VITAMIN D2) 50,000 unit Cap Take 1 capsule (50,000 Units total) by mouth every 7 days.    ferrous sulfate 325 (65 FE) MG EC tablet Take 325 mg by mouth once daily.    fish oil-omega-3 fatty acids 300-1,000 mg capsule Take 2 capsules by mouth every morning.    furosemide (LASIX) 40 MG tablet Take 1 tablet (40 mg total) by mouth once daily.    hydrALAZINE (APRESOLINE) 50 MG tablet Take 1 tablet (50 mg total) by mouth 3 (three) times daily.    ipratropium-albuteroL (COMBIVENT RESPIMAT)  mcg/actuation inhaler Inhale 2 puffs into the lungs every 4 (four) hours as needed for Shortness of Breath. Rescue    isosorbide mononitrate (IMDUR) 120 MG 24 hr tablet Take 1 tablet (120 mg total) by mouth once daily.    metoprolol tartrate (LOPRESSOR) 50 MG tablet Take 1 tablet (50 mg total) by mouth 3 (three) times daily.    pantoprazole (PROTONIX) 40 MG tablet Take 1 tablet (40 mg total) by mouth once daily.    polycarbophil (FIBERCON) 625 mg tablet Take 625 mg by mouth once daily.    ranolazine (RANEXA) 500 MG Tb12 Take 1 tablet (500 mg total) by mouth 2 (two) times daily.    ticagrelor (BRILINTA) 90 mg tablet Take 1 tablet (90 mg total) by mouth every 12 (twelve) hours.    vit C/E/Zn/coppr/lutein/zeaxan (PRESERVISION AREDS-2 ORAL) Take 1 tablet by mouth once daily.    cholestyramine (QUESTRAN) 4 gram packet Take 1 packet (4 g total) by mouth 2 (two) times daily.    fluticasone-salmeterol  diskus inhaler 250-50 mcg Inhale 1 puff into the lungs 2 (two) times daily. (Patient not taking: Reported on 2023)    gabapentin (NEURONTIN) 100 MG capsule Take 1 capsule (100 mg total) by mouth every evening.    [] levoFLOXacin (LEVAQUIN) 250 MG tablet Take 1 tablet (250 mg total) by mouth once daily. for 7 days    nebulizer and compressor Reshma as directed    nitroGLYCERIN 0.4 MG/DOSE TL SPRY (NITROLINGUAL) 400 mcg/spray spray Place 1 spray under the tongue every 5 (five) minutes as needed for Chest pain (max of 3 doses).    umeclidinium (INCRUSE ELLIPTA) 62.5 mcg/actuation inhalation capsule Inhale 62.5 mcg into the lungs every morning. Controller (Patient not taking: Reported on 2023)    [DISCONTINUED] benazepriL (LOTENSIN) 40 MG tablet Take 1 tablet (40 mg total) by mouth once daily.    [DISCONTINUED] cimetidine (TAGAMET) 300 MG tablet Take 1 tablet (300 mg total) by mouth every 6 (six) hours. Take 1 tablet (300 mg total) by mouth starting at 6 PM on 2022 (the evening before your angiogram procedure). Then take 1 tablet at 12 AM, then take 1 tablet at 6 AM on .    [DISCONTINUED] lisinopriL 10 MG tablet Take 1 tablet (10 mg total) by mouth once daily. HOLD UNTIL OTHERWISE DIRECTED BY PRIMARY CARE PROVIDER    [DISCONTINUED] lovastatin (MEVACOR) 40 MG tablet Take 1 tablet (40 mg total) by mouth every other day.     Family History       Problem Relation (Age of Onset)    Febrile seizures Mother    Heart failure Father          Tobacco Use    Smoking status: Former     Packs/day: 3.00     Years: 50.00     Pack years: 150.00     Types: Cigarettes     Start date: 3/30/1964     Quit date: 2006     Years since quittin.9    Smokeless tobacco: Never    Tobacco comments:     ex smoker 15 years   Substance and Sexual Activity    Alcohol use: Yes    Drug use: No    Sexual activity: Never     Review of Systems   Constitutional:  Negative for activity change and  appetite change.   HENT:  Negative for congestion and dental problem.    Eyes:  Negative for discharge and itching.   Respiratory:  Positive for shortness of breath.    Cardiovascular:  Negative for chest pain.   Gastrointestinal:  Negative for abdominal distention and abdominal pain.   Endocrine: Negative for cold intolerance.   Genitourinary:  Negative for difficulty urinating and dysuria.   Musculoskeletal:  Negative for arthralgias and back pain.   Skin:  Negative for color change.   Neurological:  Negative for dizziness and facial asymmetry.   Hematological:  Negative for adenopathy.   Psychiatric/Behavioral:  Negative for agitation and behavioral problems.    Objective:     Vital Signs (Most Recent):  Temp: 97.8 °F (36.6 °C) (01/27/23 0815)  Pulse: 93 (01/27/23 1232)  Resp: (!) 21 (01/27/23 1202)  BP: 131/61 (01/27/23 1232)  SpO2: 100 % (01/27/23 1232)   Vital Signs (24h Range):  Temp:  [97.7 °F (36.5 °C)-97.8 °F (36.6 °C)] 97.8 °F (36.6 °C)  Pulse:  [83-93] 93  Resp:  [18-27] 21  SpO2:  [97 %-100 %] 100 %  BP: (121-149)/(52-75) 131/61     Weight: 83 kg (183 lb)  Body mass index is 32.42 kg/m².    Physical Exam  Vitals and nursing note reviewed.   Constitutional:       General: She is not in acute distress.  HENT:      Head: Atraumatic.      Right Ear: External ear normal.      Left Ear: External ear normal.      Nose: Nose normal.      Mouth/Throat:      Mouth: Mucous membranes are moist.   Cardiovascular:      Rate and Rhythm: Normal rate.   Pulmonary:      Effort: Pulmonary effort is normal.   Abdominal:      Palpations: Abdomen is soft.   Musculoskeletal:         General: Normal range of motion.      Cervical back: Normal range of motion.      Right lower leg: Edema present.      Left lower leg: Edema present.   Skin:     General: Skin is warm.   Neurological:      Mental Status: She is alert and oriented to person, place, and time.   Psychiatric:         Behavior: Behavior normal.           Significant  Labs: All pertinent labs within the past 24 hours have been reviewed.  CBC:   Recent Labs   Lab 01/27/23  0725 01/27/23  0728   WBC  --  11.54   HGB  --  8.2*   HCT 26* 27.5*   PLT  --  275     CMP:   Recent Labs   Lab 01/27/23  0728      K 4.7      CO2 22*   *   BUN 51*   CREATININE 5.5*   CALCIUM 8.2*   PROT 6.1   ALBUMIN 2.9*   BILITOT 0.8   ALKPHOS 57   AST 17   ALT 11   ANIONGAP 14       Significant Imaging: I have reviewed all pertinent imaging results/findings within the past 24 hours.

## 2023-01-27 NOTE — H&P
[facility] - Emergency Dept  Hospital Medicine  History & Physical    Patient Name: Marisol Kerr  MRN: [id number]  Patient Class: IP- Inpatient  Admission Date: 1/27/2023  Attending Physician: Dean Vanessa MD   Primary Care Provider: Khadar Dwyer MD         Patient information was obtained from patient, past medical records and ER records.     Subjective:     Principal Problem:Acute respiratory failure    Chief Complaint:   Chief Complaint   Patient presents with    Shortness of Breath        HPI: 77 year old patient getting admitted with acute on chronic respiratory failure due to R sided Pneumonia and possible acute CHF flare  Pt was in this hospital very recently and went home with Lasix as per instructions from Nephrology MD   She started having Cough and shortness of breath 2 days ago  Today symptoms went worse and she came to ER , was put on BIPAP and got admitted  She was also found to be on ISSA with CKD        Past Medical History:   Diagnosis Date    CAD (coronary artery disease)     Cancer     colon    CHF (congestive heart failure)     Colitis     Colon cancer     COPD (chronic obstructive pulmonary disease)     Decreased hearing     Diabetes mellitus     Diabetes mellitus, type 2     Hypercholesterolemia     Hypertension     Hypoxemia     Insomnia     Obesity     Osteoarthritis        Past Surgical History:   Procedure Laterality Date    ANGIOGRAM, CORONARY, WITH LEFT HEART CATHETERIZATION N/A 2/10/2022    Procedure: Angiogram, Coronary, with Left Heart Cath;  Surgeon: Cristian Benjamin MD;  Location: [facility] CATH/EP LAB;  Service: Cardiology;  Laterality: N/A;    CARDIAC CATHETERIZATION      CHOLECYSTECTOMY      COLECTOMY      CORONARY ANGIOPLASTY      CORONARY STENT PLACEMENT      ERCP N/A 1/16/2023    Procedure: ERCP (ENDOSCOPIC RETROGRADE CHOLANGIOPANCREATOGRAPHY);  Surgeon: Biju Kramer III, MD;  Location: [facility] ENDO;  Service: Endoscopy;  Laterality: N/A;     EXTERNAL EAR SURGERY      EYE SURGERY      FLEXIBLE SIGMOIDOSCOPY N/A 9/19/2019    Procedure: SIGMOIDOSCOPY, FLEXIBLE;  Surgeon: Biju Kramer III, MD;  Location: [facility];  Service: Endoscopy;  Laterality: N/A;    HYSTERECTOMY      partial       Review of patient's allergies indicates:   Allergen Reactions    Iodinated contrast media     Strawberries [strawberry] Hives and Itching       No current facility-administered medications on file prior to encounter.     Current Outpatient Medications on File Prior to Encounter   Medication Sig    albuterol (VENTOLIN HFA) 90 mcg/actuation inhaler Inhale 2 puffs into the lungs every 6 (six) hours as needed for Wheezing or Shortness of Breath. Rescue    albuterol-ipratropium (DUO-NEB) 2.5 mg-0.5 mg/3 mL nebulizer solution Take 3 mLs by nebulization every 4 (four) hours as needed for Wheezing or Shortness of Breath. Rescue    allopurinoL (ZYLOPRIM) 100 MG tablet Take 0.5 tablets (50 mg total) by mouth once daily.    amLODIPine (NORVASC) 10 MG tablet Take 0.5 tablets (5 mg total) by mouth once daily.    aspirin (ECOTRIN) 81 MG EC tablet Take 1 tablet (81 mg total) by mouth every morning.    atorvastatin (LIPITOR) 40 MG tablet Take 1 tablet (40 mg total) by mouth once daily.    cholecalciferol, vitamin D3, 125 mcg (5,000 unit) Tab Take 5,000 Units by mouth once daily.    coenzyme Q10 100 mg capsule Take 100 mg by mouth every morning.    diltiaZEM (CARDIZEM CD) 120 MG Cp24 Take 1 capsule (120 mg total) by mouth once daily.    ergocalciferol (VITAMIN D2) 50,000 unit Cap Take 1 capsule (50,000 Units total) by mouth every 7 days.    ferrous sulfate 325 (65 FE) MG EC tablet Take 325 mg by mouth once daily.    fish oil-omega-3 fatty acids 300-1,000 mg capsule Take 2 capsules by mouth every morning.    furosemide (LASIX) 40 MG tablet Take 1 tablet (40 mg total) by mouth once daily.    hydrALAZINE (APRESOLINE) 50 MG tablet Take 1 tablet (50 mg total) by  mouth 3 (three) times daily.    ipratropium-albuteroL (COMBIVENT RESPIMAT)  mcg/actuation inhaler Inhale 2 puffs into the lungs every 4 (four) hours as needed for Shortness of Breath. Rescue    isosorbide mononitrate (IMDUR) 120 MG 24 hr tablet Take 1 tablet (120 mg total) by mouth once daily.    metoprolol tartrate (LOPRESSOR) 50 MG tablet Take 1 tablet (50 mg total) by mouth 3 (three) times daily.    pantoprazole (PROTONIX) 40 MG tablet Take 1 tablet (40 mg total) by mouth once daily.    polycarbophil (FIBERCON) 625 mg tablet Take 625 mg by mouth once daily.    ranolazine (RANEXA) 500 MG Tb12 Take 1 tablet (500 mg total) by mouth 2 (two) times daily.    ticagrelor (BRILINTA) 90 mg tablet Take 1 tablet (90 mg total) by mouth every 12 (twelve) hours.    vit C/E/Zn/coppr/lutein/zeaxan (PRESERVISION AREDS-2 ORAL) Take 1 tablet by mouth once daily.    cholestyramine (QUESTRAN) 4 gram packet Take 1 packet (4 g total) by mouth 2 (two) times daily.    fluticasone-salmeterol diskus inhaler 250-50 mcg Inhale 1 puff into the lungs 2 (two) times daily. (Patient not taking: Reported on 2023)    gabapentin (NEURONTIN) 100 MG capsule Take 1 capsule (100 mg total) by mouth every evening.    [] levoFLOXacin (LEVAQUIN) 250 MG tablet Take 1 tablet (250 mg total) by mouth once daily. for 7 days    nebulizer and compressor Reshma as directed    nitroGLYCERIN 0.4 MG/DOSE TL SPRY (NITROLINGUAL) 400 mcg/spray spray Place 1 spray under the tongue every 5 (five) minutes as needed for Chest pain (max of 3 doses).    umeclidinium (INCRUSE ELLIPTA) 62.5 mcg/actuation inhalation capsule Inhale 62.5 mcg into the lungs every morning. Controller (Patient not taking: Reported on 2023)    [DISCONTINUED] benazepriL (LOTENSIN) 40 MG tablet Take 1 tablet (40 mg total) by mouth once daily.    [DISCONTINUED] cimetidine (TAGAMET) 300 MG tablet Take 1 tablet (300 mg total) by mouth every 6 (six) hours. Take 1 tablet  (300 mg total) by mouth starting at 6 PM on 2022 (the evening before your angiogram procedure). Then take 1 tablet at 12 AM, then take 1 tablet at 6 AM on .    [DISCONTINUED] lisinopriL 10 MG tablet Take 1 tablet (10 mg total) by mouth once daily. HOLD UNTIL OTHERWISE DIRECTED BY PRIMARY CARE PROVIDER    [DISCONTINUED] lovastatin (MEVACOR) 40 MG tablet Take 1 tablet (40 mg total) by mouth every other day.     Family History       Problem Relation (Age of Onset)    Febrile seizures Mother    Heart failure Father          Tobacco Use    Smoking status: Former     Packs/day: 3.00     Years: 50.00     Pack years: 150.00     Types: Cigarettes     Start date: 3/30/1964     Quit date: 2006     Years since quittin.9    Smokeless tobacco: Never    Tobacco comments:     ex smoker 15 years   Substance and Sexual Activity    Alcohol use: Yes    Drug use: No    Sexual activity: Never     Review of Systems   Constitutional:  Negative for activity change and appetite change.   HENT:  Negative for congestion and dental problem.    Eyes:  Negative for discharge and itching.   Respiratory:  Positive for shortness of breath.    Cardiovascular:  Negative for chest pain.   Gastrointestinal:  Negative for abdominal distention and abdominal pain.   Endocrine: Negative for cold intolerance.   Genitourinary:  Negative for difficulty urinating and dysuria.   Musculoskeletal:  Negative for arthralgias and back pain.   Skin:  Negative for color change.   Neurological:  Negative for dizziness and facial asymmetry.   Hematological:  Negative for adenopathy.   Psychiatric/Behavioral:  Negative for agitation and behavioral problems.    Objective:     Vital Signs (Most Recent):  Temp: 97.8 °F (36.6 °C) (23 0815)  Pulse: 93 (23 1232)  Resp: (!) 21 (23 1202)  BP: 131/61 (23 1232)  SpO2: 100 % (23 1232)   Vital Signs (24h Range):  Temp:  [97.7 °F (36.5 °C)-97.8 °F (36.6  °C)] 97.8 °F (36.6 °C)  Pulse:  [83-93] 93  Resp:  [18-27] 21  SpO2:  [97 %-100 %] 100 %  BP: (121-149)/(52-75) 131/61     Weight: 83 kg (183 lb)  Body mass index is 32.42 kg/m².    Physical Exam  Vitals and nursing note reviewed.   Constitutional:       General: She is not in acute distress.  HENT:      Head: Atraumatic.      Right Ear: External ear normal.      Left Ear: External ear normal.      Nose: Nose normal.      Mouth/Throat:      Mouth: Mucous membranes are moist.   Cardiovascular:      Rate and Rhythm: Normal rate.   Pulmonary:      Effort: Pulmonary effort is normal.   Abdominal:      Palpations: Abdomen is soft.   Musculoskeletal:         General: Normal range of motion.      Cervical back: Normal range of motion.      Right lower leg: Edema present.      Left lower leg: Edema present.   Skin:     General: Skin is warm.   Neurological:      Mental Status: She is alert and oriented to person, place, and time.   Psychiatric:         Behavior: Behavior normal.           Significant Labs: All pertinent labs within the past 24 hours have been reviewed.  CBC:   Recent Labs   Lab 01/27/23  0725 01/27/23  0728   WBC  --  11.54   HGB  --  8.2*   HCT 26* 27.5*   PLT  --  275     CMP:   Recent Labs   Lab 01/27/23  0728      K 4.7      CO2 22*   *   BUN 51*   CREATININE 5.5*   CALCIUM 8.2*   PROT 6.1   ALBUMIN 2.9*   BILITOT 0.8   ALKPHOS 57   AST 17   ALT 11   ANIONGAP 14       Significant Imaging: I have reviewed all pertinent imaging results/findings within the past 24 hours.    Assessment/Plan:     * Acute respiratory failure  Acute on chronic respiratory failure due to Pneumonia/CHF flare   Maintain iv abx/Diuretics     Acute pneumonia  Maintain iv abx       Coronary artery disease, multivessel with history of previous PCI  Significant coronary disease with blockages in the right coronary and circumflex mid LAD (per recent angiogram;coronary)  Recently had subendocardial ischemia and  associated chest pain on 1/17  Medical management at the moment       ISSA (acute kidney injury)  ISSA on CKD  Consult Nephro MD        Chronic diastolic congestive heart failure  Aware     Chronic hypoxemic respiratory failure  On home oxygen     Class 2 obesity in adult  Body mass index is 32.42 kg/m². Morbid obesity complicates all aspects of disease management from diagnostic modalities to treatment.          CKD (chronic kidney disease), stage III  Presently has ISSA on CKD 3      Essential hypertension, benign  Continue present regime        VTE Risk Mitigation (From admission, onward)         Ordered     enoxaparin injection 30 mg  Daily         01/27/23 1333     IP VTE HIGH RISK PATIENT  Once         01/27/23 1333     Place sequential compression device  Until discontinued         01/27/23 1333                   Dean Vanessa MD  Department of Hospital Medicine   [facility] - Emergency Dept

## 2023-01-27 NOTE — CARE UPDATE
01/27/23 0712   Patient Assessment/Suction   Level of Consciousness (AVPU) alert   Respiratory Effort Mild   Expansion/Accessory Muscles/Retractions expansion symmetric   Rhythm/Pattern, Respiratory labored;shortness of breath   PRE-TX-O2   Device (Oxygen Therapy) BIPAP   $ Is the patient on Low Flow Oxygen? Yes   Oxygen Concentration (%) 100   SpO2 100 %   Pulse Oximetry Type Continuous   $ Pulse Oximetry - Multiple Charge Pulse Oximetry - Multiple   Ready to Wean/Extubation Screen   FIO2<=50 (chart decimal) (!) 1   Preset CPAP/BiPAP Settings   Mode Of Delivery BiPAP   $ CPAP/BiPAP Daily Charge BiPAP/CPAP Daily   $ Initial CPAP/BiPAP Setup? Yes   $ Is patient using? Yes   Size of Mask Medium/Large   Sized Appropriately? Yes   Equipment Type V60   Airway Device Type medium full face mask   Humidifier not applicable   Ipap 16   EPAP (cm H2O) 8   Pressure Support (cm H2O) 8   Set Rate (Breaths/Min) 20   ITime (sec) 1   Rise Time (sec) 3   Patient CPAP/BiPAP Settings   CPAP/BIPAP ID 5   RR Total (Breaths/Min) 25   Tidal Volume (mL) 590   VE Minute Ventilation (L/min) 14.8 L/min   Peak Inspiratory Pressure (cm H2O) 18   TiTOT (%) 28   Patient Trigger - ST Mode Only (%) 88   Education   $ Education BiPAP;15 min   Respiratory Evaluation   $ Care Plan Tech Time 15 min

## 2023-01-28 PROBLEM — R06.02 SHORTNESS OF BREATH: Status: ACTIVE | Noted: 2023-01-28

## 2023-01-28 PROBLEM — D64.9 ANEMIA: Status: ACTIVE | Noted: 2023-01-28

## 2023-01-28 PROBLEM — I50.9 CONGESTIVE HEART FAILURE: Status: ACTIVE | Noted: 2023-01-28

## 2023-01-28 LAB
ABO + RH BLD: NORMAL
ALBUMIN SERPL BCP-MCNC: 2.8 G/DL (ref 3.5–5.2)
ALBUMIN SERPL BCP-MCNC: 2.8 G/DL (ref 3.5–5.2)
ALP SERPL-CCNC: 50 U/L (ref 55–135)
ALT SERPL W/O P-5'-P-CCNC: 12 U/L (ref 10–44)
ANION GAP SERPL CALC-SCNC: 7 MMOL/L (ref 8–16)
ANION GAP SERPL CALC-SCNC: 7 MMOL/L (ref 8–16)
AST SERPL-CCNC: 23 U/L (ref 10–40)
BASOPHILS # BLD AUTO: 0.01 K/UL (ref 0–0.2)
BASOPHILS NFR BLD: 0.1 % (ref 0–1.9)
BILIRUB SERPL-MCNC: 0.7 MG/DL (ref 0.1–1)
BLD GP AB SCN CELLS X3 SERPL QL: NORMAL
BLD PROD TYP BPU: NORMAL
BLOOD UNIT EXPIRATION DATE: NORMAL
BLOOD UNIT TYPE CODE: 9500
BLOOD UNIT TYPE: NORMAL
BUN SERPL-MCNC: 55 MG/DL (ref 8–23)
BUN SERPL-MCNC: 55 MG/DL (ref 8–23)
CALCIUM SERPL-MCNC: 8.2 MG/DL (ref 8.7–10.5)
CALCIUM SERPL-MCNC: 8.2 MG/DL (ref 8.7–10.5)
CHLORIDE SERPL-SCNC: 108 MMOL/L (ref 95–110)
CHLORIDE SERPL-SCNC: 108 MMOL/L (ref 95–110)
CO2 SERPL-SCNC: 23 MMOL/L (ref 23–29)
CO2 SERPL-SCNC: 23 MMOL/L (ref 23–29)
CODING SYSTEM: NORMAL
CREAT SERPL-MCNC: 5.4 MG/DL (ref 0.5–1.4)
CREAT SERPL-MCNC: 5.4 MG/DL (ref 0.5–1.4)
DIFFERENTIAL METHOD: ABNORMAL
DISPENSE STATUS: NORMAL
EOSINOPHIL # BLD AUTO: 0 K/UL (ref 0–0.5)
EOSINOPHIL NFR BLD: 0 % (ref 0–8)
ERYTHROCYTE [DISTWIDTH] IN BLOOD BY AUTOMATED COUNT: 14.1 % (ref 11.5–14.5)
ERYTHROCYTE [DISTWIDTH] IN BLOOD BY AUTOMATED COUNT: 14.1 % (ref 11.5–14.5)
ERYTHROCYTE [DISTWIDTH] IN BLOOD BY AUTOMATED COUNT: 15.2 % (ref 11.5–14.5)
EST. GFR  (NO RACE VARIABLE): 7.8 ML/MIN/1.73 M^2
EST. GFR  (NO RACE VARIABLE): 7.8 ML/MIN/1.73 M^2
FERRITIN SERPL-MCNC: 144 NG/ML (ref 20–300)
GLUCOSE SERPL-MCNC: 116 MG/DL (ref 70–110)
GLUCOSE SERPL-MCNC: 118 MG/DL (ref 70–110)
GLUCOSE SERPL-MCNC: 118 MG/DL (ref 70–110)
HCT VFR BLD AUTO: 23.2 % (ref 37–48.5)
HCT VFR BLD AUTO: 23.2 % (ref 37–48.5)
HCT VFR BLD AUTO: 25.9 % (ref 37–48.5)
HGB BLD-MCNC: 6.8 G/DL (ref 12–16)
HGB BLD-MCNC: 6.8 G/DL (ref 12–16)
HGB BLD-MCNC: 7.8 G/DL (ref 12–16)
IMM GRANULOCYTES # BLD AUTO: 0.04 K/UL (ref 0–0.04)
IMM GRANULOCYTES NFR BLD AUTO: 0.5 % (ref 0–0.5)
IRON SERPL-MCNC: 61 UG/DL (ref 30–160)
LYMPHOCYTES # BLD AUTO: 0.5 K/UL (ref 1–4.8)
LYMPHOCYTES NFR BLD: 5.5 % (ref 18–48)
MCH RBC QN AUTO: 29.3 PG (ref 27–31)
MCH RBC QN AUTO: 29.4 PG (ref 27–31)
MCH RBC QN AUTO: 29.4 PG (ref 27–31)
MCHC RBC AUTO-ENTMCNC: 29.3 G/DL (ref 32–36)
MCHC RBC AUTO-ENTMCNC: 29.3 G/DL (ref 32–36)
MCHC RBC AUTO-ENTMCNC: 30.1 G/DL (ref 32–36)
MCV RBC AUTO: 100 FL (ref 82–98)
MCV RBC AUTO: 100 FL (ref 82–98)
MCV RBC AUTO: 97 FL (ref 82–98)
MONOCYTES # BLD AUTO: 0.3 K/UL (ref 0.3–1)
MONOCYTES NFR BLD: 2.9 % (ref 4–15)
NEUTROPHILS # BLD AUTO: 7.9 K/UL (ref 1.8–7.7)
NEUTROPHILS NFR BLD: 91 % (ref 38–73)
NRBC BLD-RTO: 0 /100 WBC
NUM UNITS TRANS PACKED RBC: NORMAL
OB PNL STL: POSITIVE
PHOSPHATE SERPL-MCNC: 5.9 MG/DL (ref 2.7–4.5)
PLATELET # BLD AUTO: 209 K/UL (ref 150–450)
PLATELET # BLD AUTO: 209 K/UL (ref 150–450)
PLATELET # BLD AUTO: 226 K/UL (ref 150–450)
PMV BLD AUTO: 12 FL (ref 9.2–12.9)
PMV BLD AUTO: 12.2 FL (ref 9.2–12.9)
PMV BLD AUTO: 12.2 FL (ref 9.2–12.9)
POTASSIUM SERPL-SCNC: 5 MMOL/L (ref 3.5–5.1)
POTASSIUM SERPL-SCNC: 5 MMOL/L (ref 3.5–5.1)
PROT SERPL-MCNC: 5.7 G/DL (ref 6–8.4)
PTH-INTACT SERPL-MCNC: 183.6 PG/ML (ref 9–77)
RBC # BLD AUTO: 2.31 M/UL (ref 4–5.4)
RBC # BLD AUTO: 2.31 M/UL (ref 4–5.4)
RBC # BLD AUTO: 2.66 M/UL (ref 4–5.4)
SATURATED IRON: 26 % (ref 20–50)
SODIUM SERPL-SCNC: 138 MMOL/L (ref 136–145)
SODIUM SERPL-SCNC: 138 MMOL/L (ref 136–145)
TOTAL IRON BINDING CAPACITY: 234 UG/DL (ref 250–450)
TRANSFERRIN SERPL-MCNC: 167 MG/DL (ref 200–375)
WBC # BLD AUTO: 12.19 K/UL (ref 3.9–12.7)
WBC # BLD AUTO: 8.67 K/UL (ref 3.9–12.7)
WBC # BLD AUTO: 8.67 K/UL (ref 3.9–12.7)

## 2023-01-28 PROCEDURE — 82962 GLUCOSE BLOOD TEST: CPT

## 2023-01-28 PROCEDURE — 86920 COMPATIBILITY TEST SPIN: CPT | Performed by: STUDENT IN AN ORGANIZED HEALTH CARE EDUCATION/TRAINING PROGRAM

## 2023-01-28 PROCEDURE — 83970 ASSAY OF PARATHORMONE: CPT | Performed by: INTERNAL MEDICINE

## 2023-01-28 PROCEDURE — 99223 PR INITIAL HOSPITAL CARE,LEVL III: ICD-10-PCS | Mod: ,,, | Performed by: INTERNAL MEDICINE

## 2023-01-28 PROCEDURE — 80069 RENAL FUNCTION PANEL: CPT | Performed by: INTERNAL MEDICINE

## 2023-01-28 PROCEDURE — 84165 PROTEIN E-PHORESIS SERUM: CPT | Performed by: INTERNAL MEDICINE

## 2023-01-28 PROCEDURE — 36430 TRANSFUSION BLD/BLD COMPNT: CPT

## 2023-01-28 PROCEDURE — 25000003 PHARM REV CODE 250: Performed by: INTERNAL MEDICINE

## 2023-01-28 PROCEDURE — 63600175 PHARM REV CODE 636 W HCPCS: Mod: JG | Performed by: INTERNAL MEDICINE

## 2023-01-28 PROCEDURE — 36415 COLL VENOUS BLD VENIPUNCTURE: CPT | Performed by: STUDENT IN AN ORGANIZED HEALTH CARE EDUCATION/TRAINING PROGRAM

## 2023-01-28 PROCEDURE — 82728 ASSAY OF FERRITIN: CPT | Performed by: INTERNAL MEDICINE

## 2023-01-28 PROCEDURE — 21000000 HC CCU ICU ROOM CHARGE

## 2023-01-28 PROCEDURE — 85027 COMPLETE CBC AUTOMATED: CPT | Performed by: INTERNAL MEDICINE

## 2023-01-28 PROCEDURE — 36415 COLL VENOUS BLD VENIPUNCTURE: CPT | Performed by: INTERNAL MEDICINE

## 2023-01-28 PROCEDURE — 84155 ASSAY OF PROTEIN SERUM: CPT | Performed by: INTERNAL MEDICINE

## 2023-01-28 PROCEDURE — 99900031 HC PATIENT EDUCATION (STAT)

## 2023-01-28 PROCEDURE — 82270 OCCULT BLOOD FECES: CPT | Performed by: INTERNAL MEDICINE

## 2023-01-28 PROCEDURE — 94660 CPAP INITIATION&MGMT: CPT

## 2023-01-28 PROCEDURE — 94761 N-INVAS EAR/PLS OXIMETRY MLT: CPT

## 2023-01-28 PROCEDURE — 99900035 HC TECH TIME PER 15 MIN (STAT)

## 2023-01-28 PROCEDURE — 27000221 HC OXYGEN, UP TO 24 HOURS

## 2023-01-28 PROCEDURE — P9016 RBC LEUKOCYTES REDUCED: HCPCS | Performed by: STUDENT IN AN ORGANIZED HEALTH CARE EDUCATION/TRAINING PROGRAM

## 2023-01-28 PROCEDURE — 85025 COMPLETE CBC W/AUTO DIFF WBC: CPT | Performed by: INTERNAL MEDICINE

## 2023-01-28 PROCEDURE — 80053 COMPREHEN METABOLIC PANEL: CPT | Performed by: INTERNAL MEDICINE

## 2023-01-28 PROCEDURE — 86900 BLOOD TYPING SEROLOGIC ABO: CPT | Performed by: STUDENT IN AN ORGANIZED HEALTH CARE EDUCATION/TRAINING PROGRAM

## 2023-01-28 PROCEDURE — 99223 1ST HOSP IP/OBS HIGH 75: CPT | Mod: ,,, | Performed by: INTERNAL MEDICINE

## 2023-01-28 PROCEDURE — 84466 ASSAY OF TRANSFERRIN: CPT | Performed by: INTERNAL MEDICINE

## 2023-01-28 RX ORDER — HYDROCODONE BITARTRATE AND ACETAMINOPHEN 500; 5 MG/1; MG/1
TABLET ORAL
Status: DISCONTINUED | OUTPATIENT
Start: 2023-01-28 | End: 2023-02-02

## 2023-01-28 RX ORDER — ALPRAZOLAM 0.5 MG/1
0.5 TABLET ORAL 3 TIMES DAILY PRN
Status: DISCONTINUED | OUTPATIENT
Start: 2023-01-28 | End: 2023-02-02 | Stop reason: HOSPADM

## 2023-01-28 RX ORDER — MUPIROCIN 20 MG/G
OINTMENT TOPICAL 2 TIMES DAILY
Status: DISPENSED | OUTPATIENT
Start: 2023-01-28 | End: 2023-02-02

## 2023-01-28 RX ORDER — NITROGLYCERIN 400 UG/1
1 SPRAY ORAL EVERY 5 MIN PRN
Status: DISCONTINUED | OUTPATIENT
Start: 2023-01-28 | End: 2023-02-02 | Stop reason: HOSPADM

## 2023-01-28 RX ORDER — CHLORHEXIDINE GLUCONATE ORAL RINSE 1.2 MG/ML
15 SOLUTION DENTAL 2 TIMES DAILY
Status: DISPENSED | OUTPATIENT
Start: 2023-01-28 | End: 2023-02-02

## 2023-01-28 RX ADMIN — MUPIROCIN 1 G: 20 OINTMENT TOPICAL at 09:01

## 2023-01-28 RX ADMIN — TICAGRELOR 90 MG: 90 TABLET ORAL at 09:01

## 2023-01-28 RX ADMIN — DILTIAZEM HYDROCHLORIDE 120 MG: 120 CAPSULE, COATED, EXTENDED RELEASE ORAL at 09:01

## 2023-01-28 RX ADMIN — CEFTAROLINE FOSAMIL 200 MG: 400 POWDER, FOR SOLUTION INTRAVENOUS at 09:01

## 2023-01-28 RX ADMIN — CHOLESTYRAMINE LIGHT 4 G: 4 POWDER, FOR SUSPENSION ORAL at 09:01

## 2023-01-28 RX ADMIN — CHLORHEXIDINE GLUCONATE 15 ML: 1.2 RINSE ORAL at 01:01

## 2023-01-28 RX ADMIN — CHLORHEXIDINE GLUCONATE 15 ML: 1.2 RINSE ORAL at 09:01

## 2023-01-28 RX ADMIN — ALPRAZOLAM 0.5 MG: 0.5 TABLET ORAL at 08:01

## 2023-01-28 RX ADMIN — FUROSEMIDE 40 MG: 10 INJECTION, SOLUTION INTRAMUSCULAR; INTRAVENOUS at 08:01

## 2023-01-28 RX ADMIN — MUPIROCIN 1 G: 20 OINTMENT TOPICAL at 01:01

## 2023-01-28 RX ADMIN — EPOETIN ALFA-EPBX 10000 UNITS: 10000 INJECTION, SOLUTION INTRAVENOUS; SUBCUTANEOUS at 03:01

## 2023-01-28 RX ADMIN — METOPROLOL TARTRATE 50 MG: 50 TABLET, FILM COATED ORAL at 09:01

## 2023-01-28 RX ADMIN — AMLODIPINE BESYLATE 5 MG: 5 TABLET ORAL at 09:01

## 2023-01-28 RX ADMIN — HYDRALAZINE HYDROCHLORIDE 50 MG: 25 TABLET ORAL at 09:01

## 2023-01-28 RX ADMIN — PANTOPRAZOLE SODIUM 40 MG: 40 TABLET, DELAYED RELEASE ORAL at 05:01

## 2023-01-28 RX ADMIN — ASPIRIN 81 MG: 81 TABLET, COATED ORAL at 09:01

## 2023-01-28 RX ADMIN — ISOSORBIDE MONONITRATE 120 MG: 60 TABLET, EXTENDED RELEASE ORAL at 09:01

## 2023-01-28 RX ADMIN — ATORVASTATIN CALCIUM 40 MG: 40 TABLET, FILM COATED ORAL at 09:01

## 2023-01-28 NOTE — PLAN OF CARE
Problem: Infection  Goal: Absence of Infection Signs and Symptoms  Outcome: Ongoing, Progressing     Problem: Adult Inpatient Plan of Care  Goal: Plan of Care Review  Outcome: Ongoing, Progressing  Goal: Patient-Specific Goal (Individualized)  Outcome: Ongoing, Progressing  Goal: Absence of Hospital-Acquired Illness or Injury  Outcome: Ongoing, Progressing  Goal: Optimal Comfort and Wellbeing  Outcome: Ongoing, Progressing  Goal: Readiness for Transition of Care  Outcome: Ongoing, Progressing     Problem: Diabetes Comorbidity  Goal: Blood Glucose Level Within Targeted Range  Outcome: Ongoing, Progressing     Problem: Fluid and Electrolyte Imbalance (Acute Kidney Injury/Impairment)  Goal: Fluid and Electrolyte Balance  Outcome: Ongoing, Progressing     Problem: Oral Intake Inadequate (Acute Kidney Injury/Impairment)  Goal: Optimal Nutrition Intake  Outcome: Ongoing, Progressing     Problem: Renal Function Impairment (Acute Kidney Injury/Impairment)  Goal: Effective Renal Function  Outcome: Ongoing, Progressing     Problem: Fluid Imbalance (Pneumonia)  Goal: Fluid Balance  Outcome: Ongoing, Progressing     Problem: Infection (Pneumonia)  Goal: Resolution of Infection Signs and Symptoms  Outcome: Ongoing, Progressing     Problem: Respiratory Compromise (Pneumonia)  Goal: Effective Oxygenation and Ventilation  Outcome: Ongoing, Progressing     Problem: Skin Injury Risk Increased  Goal: Skin Health and Integrity  Outcome: Ongoing, Progressing     Problem: Impaired Wound Healing  Goal: Optimal Wound Healing  Outcome: Ongoing, Progressing     Problem: Fall Injury Risk  Goal: Absence of Fall and Fall-Related Injury  Outcome: Ongoing, Progressing

## 2023-01-28 NOTE — PT/OT/SLP PROGRESS
Physical Therapy      Patient Name:  Marisol Kerr   MRN:  [id number]    Patient not seen today secondary to Patient unwilling to participate, Pain (Pt refused PT due to chest pain 2/10 & asking RN for Nitroglycerin spray to keep at bedside.). Will follow-up 1/29/23.

## 2023-01-28 NOTE — PROGRESS NOTES
Pharmacist Renal Dose Adjustment Note    Marisol Kerr is a 77 y.o. female being treated with the medication ceftaroline    Patient Data:    Vital Signs (Most Recent):  Temp: 97.7 °F (36.5 °C) (01/27/23 2000)  Pulse: 85 (01/27/23 2030)  Resp: (!) 41 (01/27/23 2030)  BP: 137/60 (01/27/23 2030)  SpO2: 95 % (01/27/23 2030)   Vital Signs (72h Range):  Temp:  [97.7 °F (36.5 °C)-97.8 °F (36.6 °C)]   Pulse:  []   Resp:  [11-46]   BP: (103-149)/(51-75)   SpO2:  [95 %-100 %]      Recent Labs   Lab 01/27/23  0728   CREATININE 5.5*     Serum creatinine: 5.5 mg/dL (H) 01/27/23 0728  Estimated creatinine clearance: 9 mL/min (A)    Per [facility] renal dosing protocol:     Previous Order: ceftaroline 200 mg Q 24 hours    Will be changed to:     New Order:ceftaroline 200 mg Q 12 hours,    Due to: [facility] protocol for crcl<10 ml/min    Renal dose adjustments performed as noted above.    We will continue monitoring and adjusting as necessary.    Pharmacist: Augie Wilder, PharmD  Ext: [phone]

## 2023-01-28 NOTE — ASSESSMENT & PLAN NOTE
Significant coronary disease with blockages in the right coronary and circumflex mid LAD (per recent angiogram;coronary)  Recently had subendocardial ischemia and associated chest pain on 1/17  Medical management at the moment   Continue Brilinta  Pt regularly uses NG spray which is normal for her

## 2023-01-28 NOTE — HOSPITAL COURSE
Patient got admitted with Acute on chronic respiratory failure due to Pneumonia/CHF flare  She was managed with diuretics and abx and her condition got better   Pt suffered from ISSA on CKD in the background of cardiorenal syndrome   Per Recent angiogram:Significant coronary disease with blockages in the right coronary and circumflex mid LAD   Recently pt  had subendocardial ischemia and associated chest pain on 1/17/2023(when she was admitted very recently)  Pt regularly uses NG spray which is normal for her and also xanax help her from anxiety episodes   She was found to be anemic and had Prbc on  1/28/2023  EGD on 1/29 showed Non-bleeding duodenal diverticulum and Chronic erosive gastritis with hemorrhage  Eventually pt was discharged to LTAC   Her long term prognosis is very poor

## 2023-01-28 NOTE — PLAN OF CARE
Problem: Infection  Goal: Absence of Infection Signs and Symptoms  Outcome: Ongoing, Progressing     Problem: Adult Inpatient Plan of Care  Goal: Plan of Care Review  Outcome: Ongoing, Progressing  Goal: Patient-Specific Goal (Individualized)  Outcome: Ongoing, Progressing  Goal: Absence of Hospital-Acquired Illness or Injury  Outcome: Ongoing, Progressing  Goal: Optimal Comfort and Wellbeing  Outcome: Ongoing, Progressing  Goal: Readiness for Transition of Care  Outcome: Ongoing, Progressing     Problem: Diabetes Comorbidity  Goal: Blood Glucose Level Within Targeted Range  Outcome: Ongoing, Progressing     Problem: Fluid and Electrolyte Imbalance (Acute Kidney Injury/Impairment)  Goal: Fluid and Electrolyte Balance  Outcome: Ongoing, Progressing     Problem: Oral Intake Inadequate (Acute Kidney Injury/Impairment)  Goal: Optimal Nutrition Intake  Outcome: Ongoing, Progressing     Problem: Renal Function Impairment (Acute Kidney Injury/Impairment)  Goal: Effective Renal Function  Outcome: Ongoing, Progressing     Problem: Fluid Imbalance (Pneumonia)  Goal: Fluid Balance  Outcome: Ongoing, Progressing     Problem: Infection (Pneumonia)  Goal: Resolution of Infection Signs and Symptoms  Outcome: Ongoing, Progressing     Problem: Respiratory Compromise (Pneumonia)  Goal: Effective Oxygenation and Ventilation  Outcome: Ongoing, Progressing     Problem: Skin Injury Risk Increased  Goal: Skin Health and Integrity  Outcome: Ongoing, Progressing     Problem: Impaired Wound Healing  Goal: Optimal Wound Healing  Outcome: Ongoing, Progressing

## 2023-01-28 NOTE — CONSULTS
[facility]  Gastroenterology  Consult Note    Patient Name: Marisol Kerr  MRN: [id number]  Admission Date: 1/27/2023  Hospital Length of Stay: 1 days  Code Status: Full Code   Attending Provider: Dean Vanessa MD   Consulting Provider: Aarti Alvarez MD  Primary Care Physician: Khadar Dwyer MD  Principal Problem:Acute respiratory failure    Inpatient consult to Gastroenterology  Consult performed by: Aarti Alvarez MD  Consult ordered by: Dean Vanessa MD      Subjective:     HPI:  This is a 77-year-old female patient with a history of very severe COPD, chronic respiratory failure and had recent admission for sepsis and cholangitis associated with choledocholithiasis.  She underwent ERCP with sphincterotomy and bile duct stone removal.  She was discharged home on Lasix.  She presents to the hospital with worsening dyspnea from her baseline in with noted acute renal injury.  She denies seeing any melena or hematochezia.    Past Medical History:   Diagnosis Date    CAD (coronary artery disease)     Cancer     colon    CHF (congestive heart failure)     Colitis     Colon cancer     COPD (chronic obstructive pulmonary disease)     Decreased hearing     Diabetes mellitus     Diabetes mellitus, type 2     Hypercholesterolemia     Hypertension     Hypoxemia     Insomnia     Obesity     Osteoarthritis        Past Surgical History:   Procedure Laterality Date    ANGIOGRAM, CORONARY, WITH LEFT HEART CATHETERIZATION N/A 2/10/2022    Procedure: Angiogram, Coronary, with Left Heart Cath;  Surgeon: Cristian Benjamin MD;  Location: [facility] CATH/EP LAB;  Service: Cardiology;  Laterality: N/A;    CARDIAC CATHETERIZATION      CHOLECYSTECTOMY      COLECTOMY      CORONARY ANGIOPLASTY      CORONARY STENT PLACEMENT      ERCP N/A 1/16/2023    Procedure: ERCP (ENDOSCOPIC RETROGRADE CHOLANGIOPANCREATOGRAPHY);  Surgeon: Biju Kramer III, MD;  Location: [facility] ENDO;  Service: Endoscopy;  Laterality: N/A;    EXTERNAL EAR  SURGERY      EYE SURGERY      FLEXIBLE SIGMOIDOSCOPY N/A 2019    Procedure: SIGMOIDOSCOPY, FLEXIBLE;  Surgeon: Biju Kramer III, MD;  Location: [facility];  Service: Endoscopy;  Laterality: N/A;    HYSTERECTOMY      partial       Review of patient's allergies indicates:   Allergen Reactions    Iodinated contrast media     Strawberries [strawberry] Hives and Itching     Family History       Problem Relation (Age of Onset)    Febrile seizures Mother    Heart failure Father          Tobacco Use    Smoking status: Former     Packs/day: 3.00     Years: 50.00     Pack years: 150.00     Types: Cigarettes     Start date: 3/30/1964     Quit date: 2006     Years since quittin.9    Smokeless tobacco: Never    Tobacco comments:     ex smoker 15 years   Substance and Sexual Activity    Alcohol use: Yes    Drug use: No    Sexual activity: Never     Review of Systems  Objective:     Vital Signs (Most Recent):  Temp: 97.8 °F (36.6 °C) (23 1550)  Pulse: 63 (23 1550)  Resp: 19 (23 1550)  BP: (!) 102/50 (23 1550)  SpO2: 100 % (23 1550)   Vital Signs (24h Range):  Temp:  [97.4 °F (36.3 °C)-98.8 °F (37.1 °C)] 97.8 °F (36.6 °C)  Pulse:  [] 63  Resp:  [11-49] 19  SpO2:  [92 %-100 %] 100 %  BP: ()/(44-71) 102/50     Weight: 83.2 kg (183 lb 6.8 oz) (23)  Body mass index is 32.49 kg/m².      Intake/Output Summary (Last 24 hours) at 2023 1559  Last data filed at 2023 1537  Gross per 24 hour   Intake 100 ml   Output 2500 ml   Net -2400 ml       Lines/Drains/Airways       Drain  Duration                  Urethral Catheter 23 0810 16 Fr. 1 day              Peripheral Intravenous Line  Duration                  Peripheral IV - Single Lumen 23 0000 20 G Left Forearm 1 day         Peripheral IV - Single Lumen 23 0721 20 G Right Antecubital 1 day                    Physical Exam  Physical Exam:  General- Patient alert and oriented x3 in NAD, bipap,  morbidly obese  HEENT- PERRLA, EOMI, OP clear, MMM  Neck- No JVD, Lymphadenopathy, Thyromegaly  CV- Regular rate and rhythm, No Murmur/estelita/rubs  Resp- Lungs CTA Bilaterally, No increased WOB  GI- Non tender/non-distended, BS normoactive x4 quads, no HSM  Extrem- No cyanosis, clubbing, edema. Pulses 2+ and symmetric  Neuro- Strength 5/5 flexors/extensors, DTRs 2+ and symmetric, Intact sensation to light touch grossly    Significant Labs:  CBC:   Recent Labs   Lab 01/27/23  0725 01/27/23  0728 01/28/23  0540   WBC  --  11.54 8.67  8.67   HGB  --  8.2* 6.8*  6.8*   HCT 26* 27.5* 23.2*  23.2*   PLT  --  275 209  209     CMP:   Recent Labs   Lab 01/28/23  0540   *  118*   CALCIUM 8.2*  8.2*   ALBUMIN 2.8*  2.8*   PROT 5.7*     138   K 5.0  5.0   CO2 23  23     108   BUN 55*  55*   CREATININE 5.4*  5.4*   ALKPHOS 50*   ALT 12   AST 23   BILITOT 0.7     Coagulation: No results for input(s): PT, INR, APTT in the last 48 hours.    Significant Imaging:  CXR: I have reviewed all results within the past 24 hours and my personal findings are:  IMPRESSION:   IMPRESSION:   New patchy right mid and lower lung zone alveolar opacities with tiny right pleural effusion. Alveolar consolidation secondary to pneumonia is favored, although pulmonary edema could give a similar appearance.   Assessment/Plan:     Active Diagnoses:    Diagnosis Date Noted POA    PRINCIPAL PROBLEM:  Acute respiratory failure [J96.00] 01/27/2023 Unknown    Anemia [D64.9] 01/28/2023 Unknown    Shortness of breath [R06.02] 01/28/2023 Unknown    Congestive heart failure [I50.9] 01/28/2023 Unknown    Acute pneumonia [J18.9] 01/27/2023 Unknown    ISSA (acute kidney injury) [N17.9] 09/18/2019 Unknown    Chronic diastolic congestive heart failure [I50.32] 09/17/2019 Yes    Chronic hypoxemic respiratory failure [J96.11] 01/16/2019 Yes    Class 2 obesity in adult [E66.9] 05/18/2015 Yes    CKD (chronic kidney disease), stage III [N18.30]  10/08/2014 Yes    Coronary artery disease, multivessel with history of previous PCI [I25.10] 06/03/2013 Yes    Essential hypertension, benign [I10] 04/09/2013 Yes      Problems Resolved During this Admission:       77-year-old female patient with a history of congestive heart failure severe COPD pneumonia/pulmonary edema with a beta natriuretic peptide of 11 6 and new anemia.  Patient has received transfusion today.  EGD in a.m. since no evidence of overt GI blood loss currently    Thank you for your consult. I will follow-up with patient. Please contact us if you have any additional questions.    Aarti Alvarez MD  Gastroenterology  [facility]

## 2023-01-28 NOTE — ASSESSMENT & PLAN NOTE
Body mass index is 32.49 kg/m². Morbid obesity complicates all aspects of disease management from diagnostic modalities to treatment.

## 2023-01-28 NOTE — PLAN OF CARE
[facility]  Initial Discharge Assessment       Primary Care Provider: Khadar Dwyer MD    Admission Diagnosis: Acute respiratory failure [J96.00]    Admission Date: 1/27/2023  Expected Discharge Date:          Payor: MEDICARE / Plan: MEDICARE PART A & B / Product Type: Government /     Extended Emergency Contact Information  Primary Emergency Contact: Marisol Kerr  Address: [address] [place] [address] [place]  Mobile Phone: [phone]  Relation: Daughter  Preferred language: English   needed? No    Discharge Plan A: Home Health, Home  Discharge Plan B: Home Health, Home      [facility] - [place], [place] - [address]  [address]  [facility] [address]  Phone: [phone] Fax: [phone]    [facility] - [place], [place] - [address]  [address]  [facility] [address]  Phone: [phone] Fax: [phone]      Initial Assessment (most recent)       Adult Discharge Assessment - 01/28/23 1351          Discharge Assessment    Assessment Type Discharge Planning Assessment     Confirmed/corrected address, phone number and insurance Yes     Confirmed Demographics Correct on Facesheet     Source of Information patient     Reason For Admission Acute respiratory failure     People in Home alone     Facility Arrived From: Home     Do you expect to return to your current living situation? Yes     Do you have help at home or someone to help you manage your care at home? Yes     Who are your caregiver(s) and their phone number(s)? Marisol Kerr (Daughter)   [phone] (Mobile)     Prior to hospitilization cognitive status: Alert/Oriented     Current cognitive status: Alert/Oriented     Walking or Climbing Stairs ambulation difficulty, requires equipment     Mobility Management Rolling walker, Cane     Equipment Currently Used at Home cane, straight;walker, rolling;oxygen     Readmission within 30 days? Yes      Patient currently being followed by outpatient case management? No     Do you currently have service(s) that help you manage your care at home? Yes     Name and Contact number of agency [facility]     Is the pt/caregiver preference to resume services with current agency Yes     Do you take prescription medications? Yes     Do you have prescription coverage? Yes     Coverage Medicare and Medicaid     Do you have any problems affording any of your prescribed medications? No     Is the patient taking medications as prescribed? yes     Who is going to help you get home at discharge? Marisol Kerr (Daughter)   [phone] (Mobile)     How do you get to doctors appointments? family or friend will provide     Are you on dialysis? No     Do you take coumadin? No     Discharge Plan A Home Health;Home     Discharge Plan B Home Health;Home     DME Needed Upon Discharge  none     Discharge Plan discussed with: Patient

## 2023-01-28 NOTE — CARE UPDATE
01/27/23 2140   Patient Assessment/Suction   Level of Consciousness (AVPU) alert   Respiratory Effort Unlabored   Expansion/Accessory Muscles/Retractions expansion symmetric   All Lung Fields Breath Sounds clear;diminished   Rhythm/Pattern, Respiratory assisted mechanically   Cough Frequency no cough   PRE-TX-O2   Device (Oxygen Therapy) BIPAP   $ Is the patient on Low Flow Oxygen? Yes   Oxygen Concentration (%) 2   SpO2 99 %   Pulse Oximetry Type Continuous   $ Pulse Oximetry - Multiple Charge Pulse Oximetry - Multiple   Pulse 77   Resp (!) 46   Positioning   Head of Bed (HOB) Positioning HOB elevated;HOB at 30-45 degrees   Ready to Wean/Extubation Screen   FIO2<=50 (chart decimal) 0.02   Preset CPAP/BiPAP Settings   Mode Of Delivery BiPAP   $ CPAP/BiPAP Daily Charge BiPAP/CPAP Daily   $ Initial CPAP/BiPAP Setup? Yes   $ Is patient using? Yes   Size of Mask Medium/Large   Sized Appropriately? Yes   Equipment Type V60   Airway Device Type medium full face mask   Humidifier not applicable   Ipap 16   EPAP (cm H2O) 8   Pressure Support (cm H2O) 8   Flow Rate (L/Min) 12   ITime (sec) 1   Patient CPAP/BiPAP Settings   FiO2 Auto Set yes   RR Total (Breaths/Min) 29   Tidal Volume (mL) 420   VE Minute Ventilation (L/min) 11 L/min   Peak Inspiratory Pressure (cm H2O) 15   TiTOT (%) 32   Total Leak (L/Min) 9   Patient Trigger - ST Mode Only (%) 96   Education   $ Education BiPAP;15 min   Respiratory Evaluation   $ Care Plan Tech Time 15 min   $ Eval/Re-eval Charges Evaluation

## 2023-01-28 NOTE — CONSULTS
INPATIENT NEPHROLOGY CONSULT   [facility] NEPHROLOGY    Marisol Kerr  01/27/2023    Reason for consultation:    Acute kidney injury    Chief Complaint:   Chief Complaint   Patient presents with    Shortness of Breath          History of Present Illness:    Per H and P    77-year-old female presents emergency room with complaints of having severe shortness of breath that began 30 minutes prior to arrival.  The patient was transported to the ED by EMS.  She was placed on CPAP EN route.  The patient is normally on home O2 at 5 L and noted to have had an oxygen saturation of 80% at home.  Patient apparently lives at home alone and she apparently called EMS.  Patient has a prior history of CHF, COPD, colon cancer, coronary artery disease.  There were no reports of her complaining of chest pain or having any nausea vomiting.  The patient is in such respiratory extremis little other history is obtainable.    1/27  seen in the ER.   She states she feels a lot better after the lasix.  No nausea, chest pain, sob, fever, urinary or bowel complaint, new neurologic symptoms, new joint pain      Plan of Care:       Assessment:    Acute kidney injury suspicious of hemodynamically mediated renal injury from heart failure  --got lasix 40mg ivp  --Avoid NSAIDS, Solano II inhibitors, and other non-essential nephrotoxic agents  --strict I/O  --keep map above 55  --hold benazepril (her outpt med list has both lisinopril and benazepril listed.  Hold both.)      Pneumonia  --renal dose abx for crcl 10-50  --if she becomes oliguric change dosing to crcl 10  --no aminoglycosides     Anemia  --iron panel  --blair when hemodynamically compensated  --trend h/h  --paraproteins    Hypertension  --ok to continue amlodipine.  Not sure why she is on Amlodipine and diltiazem as an outpatient.  Ok to continue hydralazine.    --low salt diet                Thank you for allowing us to participate in this patient's care. We will continue to follow.    Vital  Signs:  Temp Readings from Last 3 Encounters:   01/27/23 97.8 °F (36.6 °C) (Rectal)   01/19/23 98 °F (36.7 °C) (Oral)   01/10/23 98.5 °F (36.9 °C) (Oral)       Pulse Readings from Last 3 Encounters:   01/27/23 82   01/24/23 62   01/19/23 70       BP Readings from Last 3 Encounters:   01/27/23 (!) 107/51   01/24/23 (!) 118/58   01/19/23 (!) 142/82       Weight:  Wt Readings from Last 3 Encounters:   01/27/23 83 kg (183 lb)   01/24/23 83.1 kg (183 lb 3.2 oz)   01/16/23 84.3 kg (185 lb 13.6 oz)       Past Medical & Surgical History:  Past Medical History:   Diagnosis Date    CAD (coronary artery disease)     Cancer     colon    CHF (congestive heart failure)     Colitis     Colon cancer     COPD (chronic obstructive pulmonary disease)     Decreased hearing     Diabetes mellitus     Diabetes mellitus, type 2     Hypercholesterolemia     Hypertension     Hypoxemia     Insomnia     Obesity     Osteoarthritis        Past Surgical History:   Procedure Laterality Date    ANGIOGRAM, CORONARY, WITH LEFT HEART CATHETERIZATION N/A 2/10/2022    Procedure: Angiogram, Coronary, with Left Heart Cath;  Surgeon: Cristian Benjamin MD;  Location: [facility] CATH/EP LAB;  Service: Cardiology;  Laterality: N/A;    CARDIAC CATHETERIZATION      CHOLECYSTECTOMY      COLECTOMY      CORONARY ANGIOPLASTY      CORONARY STENT PLACEMENT      ERCP N/A 1/16/2023    Procedure: ERCP (ENDOSCOPIC RETROGRADE CHOLANGIOPANCREATOGRAPHY);  Surgeon: Biju Kramer III, MD;  Location: [facility] ENDO;  Service: Endoscopy;  Laterality: N/A;    EXTERNAL EAR SURGERY      EYE SURGERY      FLEXIBLE SIGMOIDOSCOPY N/A 9/19/2019    Procedure: SIGMOIDOSCOPY, FLEXIBLE;  Surgeon: Biju Kramer III, MD;  Location: [facility] ENDO;  Service: Endoscopy;  Laterality: N/A;    HYSTERECTOMY      partial       Past Social History:  Social History     Socioeconomic History    Marital status:    Tobacco Use    Smoking status: Former     Packs/day: 3.00     Years: 50.00      Pack years: 150.00     Types: Cigarettes     Start date: 3/30/1964     Quit date: 2006     Years since quittin.9    Smokeless tobacco: Never    Tobacco comments:     ex smoker 15 years   Substance and Sexual Activity    Alcohol use: Yes    Drug use: No    Sexual activity: Never     Social Determinants of Health     Financial Resource Strain: Low Risk     Difficulty of Paying Living Expenses: Not hard at all   Food Insecurity: No Food Insecurity    Worried About Running Out of Food in the Last Year: Never true    Ran Out of Food in the Last Year: Never true   Transportation Needs: No Transportation Needs    Lack of Transportation (Medical): No    Lack of Transportation (Non-Medical): No   Physical Activity: Inactive    Days of Exercise per Week: 0 days    Minutes of Exercise per Session: 0 min   Stress: Stress Concern Present    Feeling of Stress : To some extent   Social Connections: Socially Isolated    Frequency of Communication with Friends and Family: More than three times a week    Frequency of Social Gatherings with Friends and Family: More than three times a week    Attends Islam Services: Never    Active Member of Clubs or Organizations: No    Attends Club or Organization Meetings: Never    Marital Status:    Housing Stability: Low Risk     Unable to Pay for Housing in the Last Year: No    Number of Places Lived in the Last Year: 1    Unstable Housing in the Last Year: No       Medications:  No current facility-administered medications on file prior to encounter.     Current Outpatient Medications on File Prior to Encounter   Medication Sig Dispense Refill    albuterol (VENTOLIN HFA) 90 mcg/actuation inhaler Inhale 2 puffs into the lungs every 6 (six) hours as needed for Wheezing or Shortness of Breath. Rescue 36 g 3    albuterol-ipratropium (DUO-NEB) 2.5 mg-0.5 mg/3 mL nebulizer solution Take 3 mLs by nebulization every 4 (four) hours as needed for Wheezing or Shortness of Breath.  Rescue 120 each 6    allopurinoL (ZYLOPRIM) 100 MG tablet Take 0.5 tablets (50 mg total) by mouth once daily. 45 tablet 1    amLODIPine (NORVASC) 10 MG tablet Take 0.5 tablets (5 mg total) by mouth once daily. 15 tablet 11    aspirin (ECOTRIN) 81 MG EC tablet Take 1 tablet (81 mg total) by mouth every morning. 90 tablet 3    atorvastatin (LIPITOR) 40 MG tablet Take 1 tablet (40 mg total) by mouth once daily. 30 tablet 0    cholecalciferol, vitamin D3, 125 mcg (5,000 unit) Tab Take 5,000 Units by mouth once daily.      coenzyme Q10 100 mg capsule Take 100 mg by mouth every morning.      diltiaZEM (CARDIZEM CD) 120 MG Cp24 Take 1 capsule (120 mg total) by mouth once daily. 90 capsule 1    ergocalciferol (VITAMIN D2) 50,000 unit Cap Take 1 capsule (50,000 Units total) by mouth every 7 days. 12 capsule 1    ferrous sulfate 325 (65 FE) MG EC tablet Take 325 mg by mouth once daily.      fish oil-omega-3 fatty acids 300-1,000 mg capsule Take 2 capsules by mouth every morning.      furosemide (LASIX) 40 MG tablet Take 1 tablet (40 mg total) by mouth once daily. 30 tablet 0    hydrALAZINE (APRESOLINE) 50 MG tablet Take 1 tablet (50 mg total) by mouth 3 (three) times daily. 90 tablet 0    ipratropium-albuteroL (COMBIVENT RESPIMAT)  mcg/actuation inhaler Inhale 2 puffs into the lungs every 4 (four) hours as needed for Shortness of Breath. Rescue 12 g 3    isosorbide mononitrate (IMDUR) 120 MG 24 hr tablet Take 1 tablet (120 mg total) by mouth once daily. 90 tablet 1    metoprolol tartrate (LOPRESSOR) 50 MG tablet Take 1 tablet (50 mg total) by mouth 3 (three) times daily. 90 tablet 0    pantoprazole (PROTONIX) 40 MG tablet Take 1 tablet (40 mg total) by mouth once daily. 90 tablet 1    polycarbophil (FIBERCON) 625 mg tablet Take 625 mg by mouth once daily.      ranolazine (RANEXA) 500 MG Tb12 Take 1 tablet (500 mg total) by mouth 2 (two) times daily. 180 tablet 1    ticagrelor (BRILINTA) 90 mg tablet Take 1 tablet (90 mg  total) by mouth every 12 (twelve) hours. 60 tablet 0    vit C/E/Zn/coppr/lutein/zeaxan (PRESERVISION AREDS-2 ORAL) Take 1 tablet by mouth once daily.      cholestyramine (QUESTRAN) 4 gram packet Take 1 packet (4 g total) by mouth 2 (two) times daily. 180 packet 3    fluticasone-salmeterol diskus inhaler 250-50 mcg Inhale 1 puff into the lungs 2 (two) times daily. (Patient not taking: Reported on 2023) 3 each 1    gabapentin (NEURONTIN) 100 MG capsule Take 1 capsule (100 mg total) by mouth every evening. 90 capsule 1    [] levoFLOXacin (LEVAQUIN) 250 MG tablet Take 1 tablet (250 mg total) by mouth once daily. for 7 days 7 tablet 0    nebulizer and compressor Reshma as directed 1 each 0    nitroGLYCERIN 0.4 MG/DOSE TL SPRY (NITROLINGUAL) 400 mcg/spray spray Place 1 spray under the tongue every 5 (five) minutes as needed for Chest pain (max of 3 doses). 4.9 g 1    umeclidinium (INCRUSE ELLIPTA) 62.5 mcg/actuation inhalation capsule Inhale 62.5 mcg into the lungs every morning. Controller (Patient not taking: Reported on 2023) 30 each 11    [DISCONTINUED] benazepriL (LOTENSIN) 40 MG tablet Take 1 tablet (40 mg total) by mouth once daily. 90 tablet 1    [DISCONTINUED] cimetidine (TAGAMET) 300 MG tablet Take 1 tablet (300 mg total) by mouth every 6 (six) hours. Take 1 tablet (300 mg total) by mouth starting at 6 PM on 2022 (the evening before your angiogram procedure). Then take 1 tablet at 12 AM, then take 1 tablet at 6 AM on . 3 tablet 0    [DISCONTINUED] lisinopriL 10 MG tablet Take 1 tablet (10 mg total) by mouth once daily. HOLD UNTIL OTHERWISE DIRECTED BY PRIMARY CARE PROVIDER 90 tablet 3    [DISCONTINUED] lovastatin (MEVACOR) 40 MG tablet Take 1 tablet (40 mg total) by mouth every other day. 45 tablet 3     Scheduled Meds:   amLODIPine  5 mg Oral Daily    aspirin  81 mg Oral QAM    atorvastatin  40 mg Oral Daily    [START ON 2023] ceftaroline (TEFLARO) IVPB  200  "mg Intravenous Daily    cholestyramine-aspartame  1 packet Oral BID    diltiaZEM  120 mg Oral Daily    enoxaparin  30 mg Subcutaneous Daily    furosemide (LASIX) injection  40 mg Intravenous Daily    hydrALAZINE  50 mg Oral TID    isosorbide mononitrate  120 mg Oral Daily    [START ON 1/29/2023] levoFLOXacin  500 mg Intravenous Q48H    metoprolol tartrate  50 mg Oral TID    pantoprazole  40 mg Oral Daily    ticagrelor  90 mg Oral Q12H     Continuous Infusions:  PRN Meds:.acetaminophen, albuterol-ipratropium    Allergies:  Iodinated contrast media and Strawberries [strawberry]    Past Family History:  Reviewed; refer to Hospitalist Admission Note    Review of Systems:  Review of Systems - All 14 systems reviewed and negative, except as noted in HPI    Physical Exam:    BP (!) 107/51   Pulse 82   Temp 97.8 °F (36.6 °C) (Rectal)   Resp 14   Ht 5' 3" (1.6 m)   Wt 83 kg (183 lb)   SpO2 98%   BMI 32.42 kg/m²     General Appearance:    Alert, cooperative, no distress, appears stated age   Head:    Normocephalic, without obvious abnormality, atraumatic   Eyes:    PER, conjunctiva/corneas clear, EOM's intact in both eyes        Throat:   Lips, mucosa, and tongue normal; teeth and gums normal   Back:     Symmetric, no curvature, ROM normal, no CVA tenderness   Lungs:     Right sided crackles.  Diminished at the bases    Chest wall:    No tenderness or deformity   Heart:    Regular rate and rhythm, S1 and S2 normal, no murmur, rub   or gallop   Abdomen:     Soft, non-tender, bowel sounds active all four quadrants,     no masses, no organomegaly   Extremities:   Extremities normal, atraumatic, no cyanosis or edema   Pulses:   2+ and symmetric all extremities   MSK:   No joint or muscle swelling, tenderness or deformity   Skin:   Skin color, texture, turgor normal, no rashes or lesions   Neurologic:   CNII-XII intact, normal strength and sensation       Throughout.  No flap     Results:  Lab Results   Component Value Date "     01/27/2023    K 4.7 01/27/2023     01/27/2023    CO2 22 (L) 01/27/2023    BUN 51 (H) 01/27/2023    CREATININE 5.5 (H) 01/27/2023    CALCIUM 8.2 (L) 01/27/2023    ANIONGAP 14 01/27/2023    ESTGFRAFRICA 39.0 (A) 06/30/2022    EGFRNONAA 33.8 (A) 06/30/2022       Lab Results   Component Value Date    CALCIUM 8.2 (L) 01/27/2023    PHOS 3.5 03/31/2022       Recent Labs   Lab 01/27/23  0728   WBC 11.54   RBC 2.75*   HGB 8.2*   HCT 27.5*      *   MCH 29.8   MCHC 29.8*          I have personally reviewed pertinent radiological imaging and reports.    Patient care was time spent personally by me on the following activities:   Obtaining a history  Examination of patient.  Providing medical care at the patients bedside.  Developing a treatment plan with patient or surrogate and bedside caregivers  Ordering and reviewing laboratory studies, radiographic studies, pulse oximetry.  Ordering and performing treatments and interventions.  Evaluation of patient's response to treatment.  Discussions with consultants while on the unit and immediately available to the patient.  Re-evaluation of the patient's condition.  Documentation in the medical record.     Augie Myrick MD  Nephrology  [facility]  [phone]

## 2023-01-28 NOTE — CARE UPDATE
01/28/23 0806   Patient Assessment/Suction   Level of Consciousness (AVPU) alert   All Lung Fields Breath Sounds clear;diminished   Skin Integrity   $ Wound Care Tech Time 15 min   Area Observed Bridge of nose   Skin Appearance without discoloration   PRE-TX-O2   Device (Oxygen Therapy) BIPAP   $ Is the patient on Low Flow Oxygen? Yes   Oxygen Concentration (%) 30   Pulse Oximetry Type Continuous   $ Pulse Oximetry - Multiple Charge Pulse Oximetry - Multiple   Resp (!) 30  (pt very anxious this morning)   Aerosol Therapy   $ Aerosol Therapy Charges PRN treatment not required   Ready to Wean/Extubation Screen   FIO2<=50 (chart decimal) 0.3   Preset CPAP/BiPAP Settings   Mode Of Delivery BiPAP   $ CPAP/BiPAP Daily Charge BiPAP/CPAP Daily   Size of Mask Small/Medium   Sized Appropriately? Yes   Equipment Type V60   Ipap 18   EPAP (cm H2O) 8   Pressure Support (cm H2O) 10   Set Rate (Breaths/Min) 12   Patient CPAP/BiPAP Settings   RR Total (Breaths/Min) 30   Tidal Volume (mL) 466   Education   $ Education Bronchodilator;15 min   Respiratory Evaluation   $ Care Plan Tech Time 15 min

## 2023-01-28 NOTE — SUBJECTIVE & OBJECTIVE
Interval History:     Review of Systems   Constitutional:  Negative for activity change and appetite change.   HENT:  Negative for congestion and dental problem.    Eyes:  Negative for discharge and itching.   Respiratory:  Positive for shortness of breath.    Cardiovascular:  Negative for chest pain.   Gastrointestinal:  Negative for abdominal distention and abdominal pain.   Endocrine: Negative for cold intolerance.   Genitourinary:  Negative for difficulty urinating and dysuria.   Musculoskeletal:  Negative for arthralgias and back pain.   Skin:  Negative for color change.   Neurological:  Negative for dizziness and facial asymmetry.   Hematological:  Negative for adenopathy.   Psychiatric/Behavioral:  Negative for agitation and behavioral problems. The patient is nervous/anxious.    Objective:     Vital Signs (Most Recent):  Temp: 98.2 °F (36.8 °C) (01/28/23 1635)  Pulse: 67 (01/28/23 1645)  Resp: 18 (01/28/23 1645)  BP: (!) 105/51 (01/28/23 1635)  SpO2: 97 % (01/28/23 1645)   Vital Signs (24h Range):  Temp:  [97.4 °F (36.3 °C)-98.8 °F (37.1 °C)] 98.2 °F (36.8 °C)  Pulse:  [62-93] 67  Resp:  [14-49] 18  SpO2:  [92 %-100 %] 97 %  BP: ()/(44-71) 105/51     Weight: 83.2 kg (183 lb 6.8 oz)  Body mass index is 32.49 kg/m².    Intake/Output Summary (Last 24 hours) at 1/28/2023 1713  Last data filed at 1/28/2023 1537  Gross per 24 hour   Intake 100 ml   Output 2500 ml   Net -2400 ml      Physical Exam  Vitals and nursing note reviewed.   Constitutional:       General: She is not in acute distress.  HENT:      Head: Atraumatic.      Right Ear: External ear normal.      Left Ear: External ear normal.      Nose: Nose normal.      Mouth/Throat:      Mouth: Mucous membranes are moist.   Cardiovascular:      Rate and Rhythm: Normal rate.   Pulmonary:      Effort: Pulmonary effort is normal.      Breath sounds: Rales present.   Musculoskeletal:         General: Normal range of motion.      Cervical back: Normal range of  motion.   Skin:     General: Skin is warm.      Comments: Redness    Neurological:      Mental Status: She is alert and oriented to person, place, and time.   Psychiatric:         Behavior: Behavior normal.       Significant Labs: All pertinent labs within the past 24 hours have been reviewed.  CBC:   Recent Labs   Lab 01/27/23  0725 01/27/23  0728 01/28/23  0540   WBC  --  11.54 8.67  8.67   HGB  --  8.2* 6.8*  6.8*   HCT 26* 27.5* 23.2*  23.2*   PLT  --  275 209  209     CMP:   Recent Labs   Lab 01/27/23  0728 01/28/23  0540    138  138   K 4.7 5.0  5.0    108  108   CO2 22* 23  23   * 118*  118*   BUN 51* 55*  55*   CREATININE 5.5* 5.4*  5.4*   CALCIUM 8.2* 8.2*  8.2*   PROT 6.1 5.7*   ALBUMIN 2.9* 2.8*  2.8*   BILITOT 0.8 0.7   ALKPHOS 57 50*   AST 17 23   ALT 11 12   ANIONGAP 14 7*  7*       Significant Imaging: I have reviewed all pertinent imaging results/findings within the past 24 hours.

## 2023-01-28 NOTE — CONSULTS
Pulmonary/Critical Care Consult      Patient name: Marisol Kerr  MRN: [id number]  Date: 01/28/2023    Admit Date: 1/27/2023  Consult Requested By: Dean Vanessa MD    Reason for Consult: COPD, acute respiratory failure    HPI:    1/28/2023 - Pt with h/o COPD (very severe FEV1 - 27%), chronic respiratory failure had recent admission and was DC home with lasix and came to ER with increased SOB, in ER was placed on BiPAP and admitted for further therapy.  She admits to increased SOB, + cough with some mucus, some chest pain which  she relates to cough.  She reports that her O2 at home is usually 4-5 but that her sats were decreased.    Review of Systems    Review of Systems   Constitutional:  Positive for malaise/fatigue. Negative for chills, diaphoresis, fever and weight loss.   HENT:  Negative for congestion.    Eyes:  Negative for pain.   Respiratory:  Positive for cough, sputum production and shortness of breath. Negative for hemoptysis, wheezing and stridor.    Cardiovascular:  Positive for leg swelling. Negative for chest pain, palpitations, orthopnea, claudication and PND.   Gastrointestinal:  Negative for abdominal pain, constipation, diarrhea, heartburn, nausea and vomiting.   Genitourinary:  Negative for dysuria, frequency and urgency.   Musculoskeletal:  Negative for falls and myalgias.   Neurological:  Positive for weakness. Negative for sensory change and focal weakness.   Psychiatric/Behavioral:  Negative for depression, substance abuse and suicidal ideas. The patient is not nervous/anxious.      Past Medical History    Past Medical History:   Diagnosis Date    CAD (coronary artery disease)     Cancer     colon    CHF (congestive heart failure)     Colitis     Colon cancer     COPD (chronic obstructive pulmonary disease)     Decreased hearing     Diabetes mellitus     Diabetes mellitus, type 2     Hypercholesterolemia     Hypertension     Hypoxemia     Insomnia     Obesity     Osteoarthritis        Past  Surgical History    Past Surgical History:   Procedure Laterality Date    ANGIOGRAM, CORONARY, WITH LEFT HEART CATHETERIZATION N/A 2/10/2022    Procedure: Angiogram, Coronary, with Left Heart Cath;  Surgeon: Cristian Benjamin MD;  Location: [facility] CATH/EP LAB;  Service: Cardiology;  Laterality: N/A;    CARDIAC CATHETERIZATION      CHOLECYSTECTOMY      COLECTOMY      CORONARY ANGIOPLASTY      CORONARY STENT PLACEMENT      ERCP N/A 1/16/2023    Procedure: ERCP (ENDOSCOPIC RETROGRADE CHOLANGIOPANCREATOGRAPHY);  Surgeon: Biju Kramer III, MD;  Location: [facility] ENDO;  Service: Endoscopy;  Laterality: N/A;    EXTERNAL EAR SURGERY      EYE SURGERY      FLEXIBLE SIGMOIDOSCOPY N/A 9/19/2019    Procedure: SIGMOIDOSCOPY, FLEXIBLE;  Surgeon: Biju Kramer III, MD;  Location: [facility] ENDO;  Service: Endoscopy;  Laterality: N/A;    HYSTERECTOMY      partial       Medications (scheduled):      amLODIPine  5 mg Oral Daily    aspirin  81 mg Oral QAM    atorvastatin  40 mg Oral Daily    ceftaroline (TEFLARO) IVPB  200 mg Intravenous Q12H    chlorhexidine  15 mL Mouth/Throat BID    cholestyramine-aspartame  1 packet Oral BID    diltiaZEM  120 mg Oral Daily    enoxaparin  30 mg Subcutaneous Daily    epoetin octavio-epbx  10,000 Units Subcutaneous Once    furosemide (LASIX) injection  40 mg Intravenous Daily    hydrALAZINE  50 mg Oral TID    isosorbide mononitrate  120 mg Oral Daily    [START ON 1/29/2023] levoFLOXacin  500 mg Intravenous Q48H    metoprolol tartrate  50 mg Oral TID    mupirocin   Nasal BID    pantoprazole  40 mg Oral Daily    ticagrelor  90 mg Oral Q12H       Medications (infusions):         Medications (prn):     sodium chloride, acetaminophen, albuterol-ipratropium, ALPRAZolam, nitroGLYCERIN 0.4 MG/DOSE TL SPRY    Family History:   Family History   Problem Relation Age of Onset    Febrile seizures Mother     Heart failure Father        Social History: Tobacco:   Social History     Tobacco Use   Smoking Status  "Former    Packs/day: 3.00    Years: 50.00    Pack years: 150.00    Types: Cigarettes    Start date: 3/30/1964    Quit date: 2006    Years since quittin.9   Smokeless Tobacco Never   Tobacco Comments    ex smoker 15 years                                EtOH:   Social History     Substance and Sexual Activity   Alcohol Use Yes                                Drugs:   Social History     Substance and Sexual Activity   Drug Use No     Physical Exam    Vital signs:  Temp:  [97.4 °F (36.3 °C)-98.8 °F (37.1 °C)]   Pulse:  []   Resp:  [11-49]   BP: ()/(44-71)   SpO2:  [92 %-100 %]     Intake/Output:   Intake/Output Summary (Last 24 hours) at 2023 1442  Last data filed at 2023 0919  Gross per 24 hour   Intake 100 ml   Output 1650 ml   Net -1550 ml        BMI: Estimated body mass index is 32.49 kg/m² as calculated from the following:    Height as of this encounter: 5' 3" (1.6 m).    Weight as of this encounter: 83.2 kg (183 lb 6.8 oz).    Physical Exam  Vitals and nursing note reviewed.   Constitutional:       General: She is not in acute distress.     Appearance: Normal appearance. She is not ill-appearing, toxic-appearing or diaphoretic.      Comments: A bit scattered as a historian   HENT:      Head: Normocephalic and atraumatic.      Right Ear: External ear normal.      Left Ear: External ear normal.      Nose: Nose normal. No congestion or rhinorrhea.      Mouth/Throat:      Mouth: Mucous membranes are moist.      Pharynx: Oropharynx is clear. No oropharyngeal exudate or posterior oropharyngeal erythema.   Eyes:      General: No scleral icterus.        Right eye: No discharge.         Left eye: No discharge.      Extraocular Movements: Extraocular movements intact.      Conjunctiva/sclera: Conjunctivae normal.      Pupils: Pupils are equal, round, and reactive to light.   Neck:      Vascular: No carotid bruit.   Cardiovascular:      Rate and Rhythm: Normal rate and regular rhythm.      " Pulses: Normal pulses.      Heart sounds: No murmur heard.    No friction rub. No gallop.   Pulmonary:      Effort: Pulmonary effort is normal. No respiratory distress.      Breath sounds: No stridor. No wheezing, rhonchi or rales.      Comments: Tachypneic  No acc m use  Chest:      Chest wall: No tenderness.   Abdominal:      General: Bowel sounds are normal. There is no distension.      Tenderness: There is no abdominal tenderness. There is no guarding.   Musculoskeletal:         General: No swelling. Normal range of motion.      Cervical back: Normal range of motion and neck supple. No rigidity or tenderness.      Right lower leg: Edema present.      Left lower leg: Edema present.   Lymphadenopathy:      Cervical: No cervical adenopathy.   Skin:     General: Skin is warm and dry.      Capillary Refill: Capillary refill takes less than 2 seconds.      Coloration: Skin is not jaundiced.      Findings: Erythema (LLE) present. No bruising.   Neurological:      General: No focal deficit present.      Mental Status: She is alert and oriented to person, place, and time. Mental status is at baseline.      Cranial Nerves: No cranial nerve deficit.      Sensory: No sensory deficit.      Motor: Weakness present.   Psychiatric:         Mood and Affect: Mood normal.         Behavior: Behavior normal.         Thought Content: Thought content normal.         Judgment: Judgment normal.       Laboratory    Recent Labs   Lab 01/28/23  0540   WBC 8.67  8.67   RBC 2.31*  2.31*   HGB 6.8*  6.8*   HCT 23.2*  23.2*     209   *  100*   MCH 29.4  29.4   MCHC 29.3*  29.3*       Recent Labs   Lab 01/28/23  0540   CALCIUM 8.2*  8.2*   PROT 5.7*     138   K 5.0  5.0   CO2 23  23     108   BUN 55*  55*   CREATININE 5.4*  5.4*   ALKPHOS 50*   ALT 12   AST 23   BILITOT 0.7       No results for input(s): PT, INR, APTT in the last 24 hours.    No results for input(s): CPK, CPKMB, TROPONINI, MB in the last  24 hours.    Additional labs: reviewed    Microbiology:       Microbiology Results (last 7 days)       Procedure Component Value Units Date/Time    Blood culture #1 **CANNOT BE ORDERED STAT** [[id number]] Collected: 01/27/23 0720    Order Status: Completed Specimen: Blood from Peripheral, Forearm, Left Updated: 01/28/23 1032     Blood Culture, Routine No Growth to date      No Growth to date    Blood culture #2 **CANNOT BE ORDERED STAT** [[id number]] Collected: 01/27/23 0745    Order Status: Completed Specimen: Blood from Peripheral, Antecubital, Right Updated: 01/28/23 1032     Blood Culture, Routine No Growth to date      No Growth to date            Radiology    X-Ray Chest AP Portable  Reason: CHF Shortness of breath    FINDINGS:  Portable chest at 712 compared with 1/17/2023 shows unchanged right subclavian central venous catheter tip near peripheral SVC. Previous right IJ catheter has been removed. Cardiomediastinal silhouette is within normal limits, given portable technique.    Patchy alveolar opacities are new in the right mid and lower lung zone. Blunting of the lateral right costophrenic angle suggests tiny right pleural effusion, new. Left lung remains clear. Pulmonary vasculature centrally is within normal limits. No pneumothorax.    No acute osseous abnormality.    IMPRESSION:  New patchy right mid and lower lung zone alveolar opacities with tiny right pleural effusion. Alveolar consolidation secondary to pneumonia is favored, although pulmonary edema could give a similar appearance.    Electronically signed by:  Flavio Lopez MD  1/27/2023 7:32 AM CST Workstation: [id number]        Additional Studies    reviewed    Ventilator Information    Oxygen Concentration (%):  [2-30] 30         Recent Labs     01/27/23  0725   PH 7.234*   PCO2 52.1*   PO2 271*   HCO3 22.0*   POCSATURATED 100   BE -5         Impression    Active Hospital Problems    Diagnosis  POA    *Acute respiratory failure [J96.00]  Unknown     Anemia [D64.9]  Unknown    Acute pneumonia [J18.9]  Unknown    ISSA (acute kidney injury) [N17.9]  Unknown    Chronic diastolic congestive heart failure [I50.32]  Yes    Chronic hypoxemic respiratory failure [J96.11]  Yes     3 L at home. Is on 3L during first day of admission.       Class 2 obesity in adult [E66.9]  Yes    CKD (chronic kidney disease), stage III [N18.30]  Yes    Coronary artery disease, multivessel with history of previous PCI [I25.10]  Yes    Essential hypertension, benign [I10]  Yes      Resolved Hospital Problems   No resolved problems to display.       Plan    BiPAP as needed, O2 as able  Antibiotics but note low procalcitonin - will recheck and if still low would de-escalate  Prn respiratory treatments  May have cellulitis of LLE - will follow   Tali as able  Renal following for ISSA  Getting PRBC - no active bleeding reported    Thank you for this consult.  I will follow with you while the patient is hospitalized.        Dereje Saul MD  [facility] Pulmonary/Critical Care  01/28/2023

## 2023-01-28 NOTE — NURSING
Spoke with   Dr. Vanessa. He wants to give Brilenta d/t high risk for CAD. New orders received to consult GI and obtain fecal occult blood.

## 2023-01-28 NOTE — PROGRESS NOTES
[facility] Medicine  Progress Note    Patient Name: Marisol Kerr  MRN: [id number]  Patient Class: IP- Inpatient   Admission Date: 1/27/2023  Length of Stay: 1 days  Attending Physician: Dean Vanessa MD  Primary Care Provider: Khadar Dywer MD        Subjective:     Principal Problem:Acute respiratory failure        HPI:  77 year old patient getting admitted with acute on chronic respiratory failure due to R sided Pneumonia and possible acute CHF flare  Pt was in this hospital very recently and went home with Lasix as per instructions from Nephrology MD   She started having Cough and shortness of breath 2 days ago  Today symptoms went worse and she came to ER , was put on BIPAP and got admitted  She was also found to be on ISSA with CKD        Overview/Hospital Course:  1/28  Got pRBC  FOBT positive  EGD in AM  Continously having chest pain which is normal for her  Received Xanax for anxiety episodes       Interval History:     Review of Systems   Constitutional:  Negative for activity change and appetite change.   HENT:  Negative for congestion and dental problem.    Eyes:  Negative for discharge and itching.   Respiratory:  Positive for shortness of breath.    Cardiovascular:  Negative for chest pain.   Gastrointestinal:  Negative for abdominal distention and abdominal pain.   Endocrine: Negative for cold intolerance.   Genitourinary:  Negative for difficulty urinating and dysuria.   Musculoskeletal:  Negative for arthralgias and back pain.   Skin:  Negative for color change.   Neurological:  Negative for dizziness and facial asymmetry.   Hematological:  Negative for adenopathy.   Psychiatric/Behavioral:  Negative for agitation and behavioral problems. The patient is nervous/anxious.    Objective:     Vital Signs (Most Recent):  Temp: 98.2 °F (36.8 °C) (01/28/23 1635)  Pulse: 67 (01/28/23 1645)  Resp: 18 (01/28/23 1645)  BP: (!) 105/51 (01/28/23 1635)  SpO2: 97 % (01/28/23 1645)   Vital Signs  (24h Range):  Temp:  [97.4 °F (36.3 °C)-98.8 °F (37.1 °C)] 98.2 °F (36.8 °C)  Pulse:  [62-93] 67  Resp:  [14-49] 18  SpO2:  [92 %-100 %] 97 %  BP: ()/(44-71) 105/51     Weight: 83.2 kg (183 lb 6.8 oz)  Body mass index is 32.49 kg/m².    Intake/Output Summary (Last 24 hours) at 1/28/2023 1713  Last data filed at 1/28/2023 1537  Gross per 24 hour   Intake 100 ml   Output 2500 ml   Net -2400 ml      Physical Exam  Vitals and nursing note reviewed.   Constitutional:       General: She is not in acute distress.  HENT:      Head: Atraumatic.      Right Ear: External ear normal.      Left Ear: External ear normal.      Nose: Nose normal.      Mouth/Throat:      Mouth: Mucous membranes are moist.   Cardiovascular:      Rate and Rhythm: Normal rate.   Pulmonary:      Effort: Pulmonary effort is normal.      Breath sounds: Rales present.   Musculoskeletal:         General: Normal range of motion.      Cervical back: Normal range of motion.   Skin:     General: Skin is warm.      Comments: Redness    Neurological:      Mental Status: She is alert and oriented to person, place, and time.   Psychiatric:         Behavior: Behavior normal.       Significant Labs: All pertinent labs within the past 24 hours have been reviewed.  CBC:   Recent Labs   Lab 01/27/23  0725 01/27/23  0728 01/28/23  0540   WBC  --  11.54 8.67  8.67   HGB  --  8.2* 6.8*  6.8*   HCT 26* 27.5* 23.2*  23.2*   PLT  --  275 209  209     CMP:   Recent Labs   Lab 01/27/23  0728 01/28/23  0540    138  138   K 4.7 5.0  5.0    108  108   CO2 22* 23  23   * 118*  118*   BUN 51* 55*  55*   CREATININE 5.5* 5.4*  5.4*   CALCIUM 8.2* 8.2*  8.2*   PROT 6.1 5.7*   ALBUMIN 2.9* 2.8*  2.8*   BILITOT 0.8 0.7   ALKPHOS 57 50*   AST 17 23   ALT 11 12   ANIONGAP 14 7*  7*       Significant Imaging: I have reviewed all pertinent imaging results/findings within the past 24 hours.      Assessment/Plan:      * Acute respiratory failure  Acute on  chronic respiratory failure due to Pneumonia/CHF flare   Maintain iv abx/Diuretics     Acute pneumonia  Maintain iv abx for suspected  aspiration and MRSA pneumonia     Coronary artery disease, multivessel with history of previous PCI  Significant coronary disease with blockages in the right coronary and circumflex mid LAD (per recent angiogram;coronary)  Recently had subendocardial ischemia and associated chest pain on 1/17  Medical management at the moment   Continue Brilinta  Pt regularly uses NG spray which is normal for her       ISSA (acute kidney injury)  ISSA on CKD  Consult Nephro MD  Maintain iv lasix  Mostly has Cardiorenal syndrome  Poor prognosis        Shortness of breath  As above  Chronic issue       Anemia  S/p pRBC on 1/28/2023  FOBT positive  EGD on 1/29      Chronic diastolic congestive heart failure  Aware     Chronic hypoxemic respiratory failure  On home oxygen     Class 2 obesity in adult  Body mass index is 32.49 kg/m². Morbid obesity complicates all aspects of disease management from diagnostic modalities to treatment.          CKD (chronic kidney disease), stage III  Presently has ISSA on CKD 3      Essential hypertension, benign  Continue present regime        VTE Risk Mitigation (From admission, onward)         Ordered     IP VTE HIGH RISK PATIENT  Once         01/27/23 1333     Place sequential compression device  Until discontinued         01/27/23 1333                Discharge Planning   LUZ:      Code Status: Full Code   Is the patient medically ready for discharge?:     Reason for patient still in hospital (select all that apply): Treatment  Discharge Plan A: Home Health, Home                  Dean Vanessa MD  Department of Hospital Medicine   [facility]

## 2023-01-28 NOTE — PT/OT/SLP PROGRESS
Occupational Therapy      Patient Name:  Marisol Kerr   MRN:  [id number]    Patient not seen today secondary to Patient unwilling to participate (chest pain). Will follow-up 1/29/23.    1/28/2023

## 2023-01-28 NOTE — ASSESSMENT & PLAN NOTE
ISSA on CKD  Consult Nephro MD  Maintain iv lasix  Mostly has Cardiorenal syndrome  Poor prognosis

## 2023-01-28 NOTE — CONSULTS
INPATIENT NEPHROLOGY CONSULT   [facility] NEPHROLOGY    Marisol Kerr  01/28/2023    Reason for consultation:    Acute kidney injury    Chief Complaint:   Chief Complaint   Patient presents with    Shortness of Breath          History of Present Illness:    Per H and P    77-year-old female presents emergency room with complaints of having severe shortness of breath that began 30 minutes prior to arrival.  The patient was transported to the ED by EMS.  She was placed on CPAP EN route.  The patient is normally on home O2 at 5 L and noted to have had an oxygen saturation of 80% at home.  Patient apparently lives at home alone and she apparently called EMS.  Patient has a prior history of CHF, COPD, colon cancer, coronary artery disease.  There were no reports of her complaining of chest pain or having any nausea vomiting.  The patient is in such respiratory extremis little other history is obtainable.    1/27  seen in the ER.   She states she feels a lot better after the lasix.  No nausea, chest pain, sob, fever, urinary or bowel complaint, new neurologic symptoms, new joint pain  1/28  1650 cc uop overnight.  AFVSS.  No nausea, chest pain, sob, fever, urinary or bowel complaint, new neurologic symptoms, new joint pain      Plan of Care:       Assessment:    Acute kidney injury suspicious of hemodynamically mediated renal injury from heart failure  --getting lasix 40mg iv daily.  Hold if creatinine goes up (unless pt has dyspnea)  --Avoid NSAIDS, Solano II inhibitors, and other non-essential nephrotoxic agents  --strict I/O  --keep map above 55  --hold benazepril (her outpt med list has both lisinopril and benazepril listed.  Hold both.)      Pneumonia/pulmonary edema (BNP 1106)  --renal dose abx for crcl 10-50  --if she becomes oliguric change dosing to crcl 10  --no aminoglycosides     Anemia  --iron stores adequate  --transfuse today  --epogen 10k sq  --trend h/h  --paraproteins    Hypertension  --ok to continue  amlodipine.  Not sure why she is on Amlodipine and diltiazem as an outpatient.  Ok to continue hydralazine.    --low salt diet       Secondary hyperparathyroidism  --pth level acceptable  --low phosphorus diet  --check vit d level          Thank you for allowing us to participate in this patient's care. We will continue to follow.    Vital Signs:  Temp Readings from Last 3 Encounters:   01/28/23 97.4 °F (36.3 °C) (Axillary)   01/19/23 98 °F (36.7 °C) (Oral)   01/10/23 98.5 °F (36.9 °C) (Oral)       Pulse Readings from Last 3 Encounters:   01/28/23 73   01/24/23 62   01/19/23 70       BP Readings from Last 3 Encounters:   01/28/23 (!) 111/52   01/24/23 (!) 118/58   01/19/23 (!) 142/82       Weight:  Wt Readings from Last 3 Encounters:   01/27/23 83.2 kg (183 lb 6.8 oz)   01/24/23 83.1 kg (183 lb 3.2 oz)   01/16/23 84.3 kg (185 lb 13.6 oz)       Past Medical & Surgical History:  Past Medical History:   Diagnosis Date    CAD (coronary artery disease)     Cancer     colon    CHF (congestive heart failure)     Colitis     Colon cancer     COPD (chronic obstructive pulmonary disease)     Decreased hearing     Diabetes mellitus     Diabetes mellitus, type 2     Hypercholesterolemia     Hypertension     Hypoxemia     Insomnia     Obesity     Osteoarthritis        Past Surgical History:   Procedure Laterality Date    ANGIOGRAM, CORONARY, WITH LEFT HEART CATHETERIZATION N/A 2/10/2022    Procedure: Angiogram, Coronary, with Left Heart Cath;  Surgeon: Cristian Benjamin MD;  Location: [facility] CATH/EP LAB;  Service: Cardiology;  Laterality: N/A;    CARDIAC CATHETERIZATION      CHOLECYSTECTOMY      COLECTOMY      CORONARY ANGIOPLASTY      CORONARY STENT PLACEMENT      ERCP N/A 1/16/2023    Procedure: ERCP (ENDOSCOPIC RETROGRADE CHOLANGIOPANCREATOGRAPHY);  Surgeon: Biju Kramer III, MD;  Location: [facility] ENDO;  Service: Endoscopy;  Laterality: N/A;    EXTERNAL EAR SURGERY      EYE SURGERY      FLEXIBLE SIGMOIDOSCOPY N/A  2019    Procedure: SIGMOIDOSCOPY, FLEXIBLE;  Surgeon: Biju Kramer III, MD;  Location: [facility];  Service: Endoscopy;  Laterality: N/A;    HYSTERECTOMY      partial       Past Social History:  Social History     Socioeconomic History    Marital status:    Tobacco Use    Smoking status: Former     Packs/day: 3.00     Years: 50.00     Pack years: 150.00     Types: Cigarettes     Start date: 3/30/1964     Quit date: 2006     Years since quittin.9    Smokeless tobacco: Never    Tobacco comments:     ex smoker 15 years   Substance and Sexual Activity    Alcohol use: Yes    Drug use: No    Sexual activity: Never     Social Determinants of Health     Financial Resource Strain: Low Risk     Difficulty of Paying Living Expenses: Not hard at all   Food Insecurity: No Food Insecurity    Worried About Running Out of Food in the Last Year: Never true    Ran Out of Food in the Last Year: Never true   Transportation Needs: No Transportation Needs    Lack of Transportation (Medical): No    Lack of Transportation (Non-Medical): No   Physical Activity: Inactive    Days of Exercise per Week: 0 days    Minutes of Exercise per Session: 0 min   Stress: Stress Concern Present    Feeling of Stress : To some extent   Social Connections: Socially Isolated    Frequency of Communication with Friends and Family: More than three times a week    Frequency of Social Gatherings with Friends and Family: More than three times a week    Attends Bahai Services: Never    Active Member of Clubs or Organizations: No    Attends Club or Organization Meetings: Never    Marital Status:    Housing Stability: Low Risk     Unable to Pay for Housing in the Last Year: No    Number of Places Lived in the Last Year: 1    Unstable Housing in the Last Year: No       Medications:  No current facility-administered medications on file prior to encounter.     Current Outpatient Medications on File Prior to Encounter   Medication  Sig Dispense Refill    albuterol (VENTOLIN HFA) 90 mcg/actuation inhaler Inhale 2 puffs into the lungs every 6 (six) hours as needed for Wheezing or Shortness of Breath. Rescue 36 g 3    albuterol-ipratropium (DUO-NEB) 2.5 mg-0.5 mg/3 mL nebulizer solution Take 3 mLs by nebulization every 4 (four) hours as needed for Wheezing or Shortness of Breath. Rescue 120 each 6    allopurinoL (ZYLOPRIM) 100 MG tablet Take 0.5 tablets (50 mg total) by mouth once daily. 45 tablet 1    amLODIPine (NORVASC) 10 MG tablet Take 0.5 tablets (5 mg total) by mouth once daily. 15 tablet 11    aspirin (ECOTRIN) 81 MG EC tablet Take 1 tablet (81 mg total) by mouth every morning. 90 tablet 3    atorvastatin (LIPITOR) 40 MG tablet Take 1 tablet (40 mg total) by mouth once daily. 30 tablet 0    cholecalciferol, vitamin D3, 125 mcg (5,000 unit) Tab Take 5,000 Units by mouth once daily.      coenzyme Q10 100 mg capsule Take 100 mg by mouth every morning.      diltiaZEM (CARDIZEM CD) 120 MG Cp24 Take 1 capsule (120 mg total) by mouth once daily. 90 capsule 1    ergocalciferol (VITAMIN D2) 50,000 unit Cap Take 1 capsule (50,000 Units total) by mouth every 7 days. 12 capsule 1    ferrous sulfate 325 (65 FE) MG EC tablet Take 325 mg by mouth once daily.      fish oil-omega-3 fatty acids 300-1,000 mg capsule Take 2 capsules by mouth every morning.      furosemide (LASIX) 40 MG tablet Take 1 tablet (40 mg total) by mouth once daily. 30 tablet 0    hydrALAZINE (APRESOLINE) 50 MG tablet Take 1 tablet (50 mg total) by mouth 3 (three) times daily. 90 tablet 0    ipratropium-albuteroL (COMBIVENT RESPIMAT)  mcg/actuation inhaler Inhale 2 puffs into the lungs every 4 (four) hours as needed for Shortness of Breath. Rescue 12 g 3    isosorbide mononitrate (IMDUR) 120 MG 24 hr tablet Take 1 tablet (120 mg total) by mouth once daily. 90 tablet 1    metoprolol tartrate (LOPRESSOR) 50 MG tablet Take 1 tablet (50 mg total) by mouth 3 (three) times daily. 90  tablet 0    pantoprazole (PROTONIX) 40 MG tablet Take 1 tablet (40 mg total) by mouth once daily. 90 tablet 1    polycarbophil (FIBERCON) 625 mg tablet Take 625 mg by mouth once daily.      ranolazine (RANEXA) 500 MG Tb12 Take 1 tablet (500 mg total) by mouth 2 (two) times daily. 180 tablet 1    ticagrelor (BRILINTA) 90 mg tablet Take 1 tablet (90 mg total) by mouth every 12 (twelve) hours. 60 tablet 0    vit C/E/Zn/coppr/lutein/zeaxan (PRESERVISION AREDS-2 ORAL) Take 1 tablet by mouth once daily.      cholestyramine (QUESTRAN) 4 gram packet Take 1 packet (4 g total) by mouth 2 (two) times daily. 180 packet 3    fluticasone-salmeterol diskus inhaler 250-50 mcg Inhale 1 puff into the lungs 2 (two) times daily. (Patient not taking: Reported on 1/24/2023) 3 each 1    gabapentin (NEURONTIN) 100 MG capsule Take 1 capsule (100 mg total) by mouth every evening. 90 capsule 1    nebulizer and compressor Reshma as directed 1 each 0    nitroGLYCERIN 0.4 MG/DOSE TL SPRY (NITROLINGUAL) 400 mcg/spray spray Place 1 spray under the tongue every 5 (five) minutes as needed for Chest pain (max of 3 doses). 4.9 g 1    umeclidinium (INCRUSE ELLIPTA) 62.5 mcg/actuation inhalation capsule Inhale 62.5 mcg into the lungs every morning. Controller (Patient not taking: Reported on 1/24/2023) 30 each 11    [DISCONTINUED] benazepriL (LOTENSIN) 40 MG tablet Take 1 tablet (40 mg total) by mouth once daily. 90 tablet 1    [DISCONTINUED] cimetidine (TAGAMET) 300 MG tablet Take 1 tablet (300 mg total) by mouth every 6 (six) hours. Take 1 tablet (300 mg total) by mouth starting at 6 PM on Sunday April 17, 2022 (the evening before your angiogram procedure). Then take 1 tablet at 12 AM, then take 1 tablet at 6 AM on Monday April 18th. 3 tablet 0    [DISCONTINUED] lisinopriL 10 MG tablet Take 1 tablet (10 mg total) by mouth once daily. HOLD UNTIL OTHERWISE DIRECTED BY PRIMARY CARE PROVIDER 90 tablet 3    [DISCONTINUED] lovastatin (MEVACOR) 40 MG tablet  "Take 1 tablet (40 mg total) by mouth every other day. 45 tablet 3     Scheduled Meds:   amLODIPine  5 mg Oral Daily    aspirin  81 mg Oral QAM    atorvastatin  40 mg Oral Daily    ceftaroline (TEFLARO) IVPB  200 mg Intravenous Q12H    chlorhexidine  15 mL Mouth/Throat BID    cholestyramine-aspartame  1 packet Oral BID    diltiaZEM  120 mg Oral Daily    enoxaparin  30 mg Subcutaneous Daily    furosemide (LASIX) injection  40 mg Intravenous Daily    hydrALAZINE  50 mg Oral TID    isosorbide mononitrate  120 mg Oral Daily    [START ON 1/29/2023] levoFLOXacin  500 mg Intravenous Q48H    metoprolol tartrate  50 mg Oral TID    mupirocin   Nasal BID    pantoprazole  40 mg Oral Daily    ticagrelor  90 mg Oral Q12H     Continuous Infusions:  PRN Meds:.sodium chloride, acetaminophen, albuterol-ipratropium, ALPRAZolam, nitroGLYCERIN 0.4 MG/DOSE TL SPRY    Allergies:  Iodinated contrast media and Strawberries [strawberry]    Past Family History:  Reviewed; refer to Hospitalist Admission Note    Review of Systems:  Review of Systems - All 14 systems reviewed and negative, except as noted in HPI    Physical Exam:    BP (!) 111/52   Pulse 73   Temp 97.4 °F (36.3 °C) (Axillary)   Resp 20   Ht 5' 3" (1.6 m)   Wt 83.2 kg (183 lb 6.8 oz)   SpO2 100%   BMI 32.49 kg/m²     General Appearance:    Alert, cooperative, no distress, appears stated age   Head:    Normocephalic, without obvious abnormality, atraumatic   Eyes:    PER, conjunctiva/corneas clear, EOM's intact in both eyes        Throat:   Lips, mucosa, and tongue normal; teeth and gums normal   Back:     Symmetric, no curvature, ROM normal, no CVA tenderness   Lungs:     Right sided crackles.  Diminished at the bases    Chest wall:    No tenderness or deformity   Heart:    Regular rate and rhythm, S1 and S2 normal, no murmur, rub   or gallop   Abdomen:     Soft, non-tender, bowel sounds active all four quadrants,     no masses, no organomegaly   Extremities:   Extremities " normal, atraumatic, no cyanosis or edema   Pulses:   2+ and symmetric all extremities   MSK:   No joint or muscle swelling, tenderness or deformity   Skin:   Skin color, texture, turgor normal, no rashes or lesions   Neurologic:   CNII-XII intact, normal strength and sensation       Throughout.  No flap     Results:  Lab Results   Component Value Date     01/28/2023     01/28/2023    K 5.0 01/28/2023    K 5.0 01/28/2023     01/28/2023     01/28/2023    CO2 23 01/28/2023    CO2 23 01/28/2023    BUN 55 (H) 01/28/2023    BUN 55 (H) 01/28/2023    CREATININE 5.4 (H) 01/28/2023    CREATININE 5.4 (H) 01/28/2023    CALCIUM 8.2 (L) 01/28/2023    CALCIUM 8.2 (L) 01/28/2023    ANIONGAP 7 (L) 01/28/2023    ANIONGAP 7 (L) 01/28/2023    ESTGFRAFRICA 39.0 (A) 06/30/2022    EGFRNONAA 33.8 (A) 06/30/2022       Lab Results   Component Value Date    CALCIUM 8.2 (L) 01/28/2023    CALCIUM 8.2 (L) 01/28/2023    PHOS 5.9 (H) 01/28/2023       Recent Labs   Lab 01/28/23  0540   WBC 8.67  8.67   RBC 2.31*  2.31*   HGB 6.8*  6.8*   HCT 23.2*  23.2*     209   *  100*   MCH 29.4  29.4   MCHC 29.3*  29.3*            I have personally reviewed pertinent radiological imaging and reports.    Patient care was time spent personally by me on the following activities:   Obtaining a history  Examination of patient.  Providing medical care at the patients bedside.  Developing a treatment plan with patient or surrogate and bedside caregivers  Ordering and reviewing laboratory studies, radiographic studies, pulse oximetry.  Ordering and performing treatments and interventions.  Evaluation of patient's response to treatment.  Discussions with consultants while on the unit and immediately available to the patient.  Re-evaluation of the patient's condition.  Documentation in the medical record.     Augie Myrick MD  Nephrology  [facility]  [phone]

## 2023-01-29 ENCOUNTER — ANESTHESIA (OUTPATIENT)
Dept: SURGERY | Facility: HOSPITAL | Age: 78
DRG: 193 | End: 2023-01-29
Payer: MEDICARE

## 2023-01-29 ENCOUNTER — ANESTHESIA EVENT (OUTPATIENT)
Dept: SURGERY | Facility: HOSPITAL | Age: 78
DRG: 193 | End: 2023-01-29
Payer: MEDICARE

## 2023-01-29 PROBLEM — R53.81 DEBILITY: Status: ACTIVE | Noted: 2023-01-29

## 2023-01-29 PROBLEM — I50.9 CONGESTIVE HEART FAILURE: Status: ACTIVE | Noted: 2023-01-29

## 2023-01-29 LAB
ALBUMIN SERPL BCP-MCNC: 2.8 G/DL (ref 3.5–5.2)
ALBUMIN SERPL BCP-MCNC: 2.8 G/DL (ref 3.5–5.2)
ALP SERPL-CCNC: 49 U/L (ref 55–135)
ALT SERPL W/O P-5'-P-CCNC: 11 U/L (ref 10–44)
ANION GAP SERPL CALC-SCNC: 7 MMOL/L (ref 8–16)
ANION GAP SERPL CALC-SCNC: 7 MMOL/L (ref 8–16)
AST SERPL-CCNC: 20 U/L (ref 10–40)
BASOPHILS # BLD AUTO: 0.02 K/UL (ref 0–0.2)
BASOPHILS NFR BLD: 0.2 % (ref 0–1.9)
BILIRUB SERPL-MCNC: 1 MG/DL (ref 0.1–1)
BNP SERPL-MCNC: 847 PG/ML (ref 0–99)
BUN SERPL-MCNC: 53 MG/DL (ref 8–23)
BUN SERPL-MCNC: 53 MG/DL (ref 8–23)
CALCIUM SERPL-MCNC: 8.2 MG/DL (ref 8.7–10.5)
CALCIUM SERPL-MCNC: 8.2 MG/DL (ref 8.7–10.5)
CHLORIDE SERPL-SCNC: 107 MMOL/L (ref 95–110)
CHLORIDE SERPL-SCNC: 107 MMOL/L (ref 95–110)
CO2 SERPL-SCNC: 25 MMOL/L (ref 23–29)
CO2 SERPL-SCNC: 25 MMOL/L (ref 23–29)
CREAT SERPL-MCNC: 4.7 MG/DL (ref 0.5–1.4)
CREAT SERPL-MCNC: 4.7 MG/DL (ref 0.5–1.4)
DIFFERENTIAL METHOD: ABNORMAL
EOSINOPHIL # BLD AUTO: 0.1 K/UL (ref 0–0.5)
EOSINOPHIL NFR BLD: 0.5 % (ref 0–8)
ERYTHROCYTE [DISTWIDTH] IN BLOOD BY AUTOMATED COUNT: 15.6 % (ref 11.5–14.5)
ERYTHROCYTE [DISTWIDTH] IN BLOOD BY AUTOMATED COUNT: 15.6 % (ref 11.5–14.5)
EST. GFR  (NO RACE VARIABLE): 9.2 ML/MIN/1.73 M^2
EST. GFR  (NO RACE VARIABLE): 9.2 ML/MIN/1.73 M^2
GLUCOSE SERPL-MCNC: 76 MG/DL (ref 70–110)
GLUCOSE SERPL-MCNC: 76 MG/DL (ref 70–110)
HCT VFR BLD AUTO: 26.8 % (ref 37–48.5)
HCT VFR BLD AUTO: 26.8 % (ref 37–48.5)
HGB BLD-MCNC: 8.1 G/DL (ref 12–16)
HGB BLD-MCNC: 8.1 G/DL (ref 12–16)
IMM GRANULOCYTES # BLD AUTO: 0.08 K/UL (ref 0–0.04)
IMM GRANULOCYTES NFR BLD AUTO: 0.7 % (ref 0–0.5)
LYMPHOCYTES # BLD AUTO: 1 K/UL (ref 1–4.8)
LYMPHOCYTES NFR BLD: 8.4 % (ref 18–48)
MCH RBC QN AUTO: 29.6 PG (ref 27–31)
MCH RBC QN AUTO: 29.6 PG (ref 27–31)
MCHC RBC AUTO-ENTMCNC: 30.2 G/DL (ref 32–36)
MCHC RBC AUTO-ENTMCNC: 30.2 G/DL (ref 32–36)
MCV RBC AUTO: 98 FL (ref 82–98)
MCV RBC AUTO: 98 FL (ref 82–98)
MONOCYTES # BLD AUTO: 0.5 K/UL (ref 0.3–1)
MONOCYTES NFR BLD: 3.7 % (ref 4–15)
NEUTROPHILS # BLD AUTO: 10.5 K/UL (ref 1.8–7.7)
NEUTROPHILS NFR BLD: 86.5 % (ref 38–73)
NRBC BLD-RTO: 0 /100 WBC
PHOSPHATE SERPL-MCNC: 4.3 MG/DL (ref 2.7–4.5)
PLATELET # BLD AUTO: 211 K/UL (ref 150–450)
PLATELET # BLD AUTO: 211 K/UL (ref 150–450)
PMV BLD AUTO: 12.4 FL (ref 9.2–12.9)
PMV BLD AUTO: 12.4 FL (ref 9.2–12.9)
POTASSIUM SERPL-SCNC: 4.1 MMOL/L (ref 3.5–5.1)
POTASSIUM SERPL-SCNC: 4.1 MMOL/L (ref 3.5–5.1)
PROCALCITONIN SERPL IA-MCNC: 0.2 NG/ML (ref 0–0.5)
PROT SERPL-MCNC: 5.5 G/DL (ref 6–8.4)
RBC # BLD AUTO: 2.74 M/UL (ref 4–5.4)
RBC # BLD AUTO: 2.74 M/UL (ref 4–5.4)
SODIUM SERPL-SCNC: 139 MMOL/L (ref 136–145)
SODIUM SERPL-SCNC: 139 MMOL/L (ref 136–145)
WBC # BLD AUTO: 12.07 K/UL (ref 3.9–12.7)
WBC # BLD AUTO: 12.07 K/UL (ref 3.9–12.7)

## 2023-01-29 PROCEDURE — 37000009 HC ANESTHESIA EA ADD 15 MINS: Performed by: INTERNAL MEDICINE

## 2023-01-29 PROCEDURE — 25000003 PHARM REV CODE 250: Performed by: NURSE ANESTHETIST, CERTIFIED REGISTERED

## 2023-01-29 PROCEDURE — 63600175 PHARM REV CODE 636 W HCPCS: Performed by: NURSE ANESTHETIST, CERTIFIED REGISTERED

## 2023-01-29 PROCEDURE — 99233 SBSQ HOSP IP/OBS HIGH 50: CPT | Mod: ,,, | Performed by: INTERNAL MEDICINE

## 2023-01-29 PROCEDURE — 36415 COLL VENOUS BLD VENIPUNCTURE: CPT | Performed by: INTERNAL MEDICINE

## 2023-01-29 PROCEDURE — 37000008 HC ANESTHESIA 1ST 15 MINUTES: Performed by: INTERNAL MEDICINE

## 2023-01-29 PROCEDURE — 83880 ASSAY OF NATRIURETIC PEPTIDE: CPT | Performed by: INTERNAL MEDICINE

## 2023-01-29 PROCEDURE — 99900031 HC PATIENT EDUCATION (STAT)

## 2023-01-29 PROCEDURE — 80053 COMPREHEN METABOLIC PANEL: CPT | Performed by: INTERNAL MEDICINE

## 2023-01-29 PROCEDURE — 43235 EGD DIAGNOSTIC BRUSH WASH: CPT | Performed by: INTERNAL MEDICINE

## 2023-01-29 PROCEDURE — 80069 RENAL FUNCTION PANEL: CPT | Performed by: INTERNAL MEDICINE

## 2023-01-29 PROCEDURE — G0179 PR HOME HEALTH MD RECERTIFICATION: ICD-10-PCS | Mod: ,,, | Performed by: FAMILY MEDICINE

## 2023-01-29 PROCEDURE — 27000221 HC OXYGEN, UP TO 24 HOURS

## 2023-01-29 PROCEDURE — D9220A PRA ANESTHESIA: ICD-10-PCS | Mod: ANES,,, | Performed by: ANESTHESIOLOGY

## 2023-01-29 PROCEDURE — 94761 N-INVAS EAR/PLS OXIMETRY MLT: CPT

## 2023-01-29 PROCEDURE — D9220A PRA ANESTHESIA: Mod: CRNA,,, | Performed by: NURSE ANESTHETIST, CERTIFIED REGISTERED

## 2023-01-29 PROCEDURE — 94799 UNLISTED PULMONARY SVC/PX: CPT

## 2023-01-29 PROCEDURE — 94660 CPAP INITIATION&MGMT: CPT

## 2023-01-29 PROCEDURE — 85025 COMPLETE CBC W/AUTO DIFF WBC: CPT | Performed by: INTERNAL MEDICINE

## 2023-01-29 PROCEDURE — G0179 MD RECERTIFICATION HHA PT: HCPCS | Mod: ,,, | Performed by: FAMILY MEDICINE

## 2023-01-29 PROCEDURE — 99233 PR SUBSEQUENT HOSPITAL CARE,LEVL III: ICD-10-PCS | Mod: ,,, | Performed by: INTERNAL MEDICINE

## 2023-01-29 PROCEDURE — D9220A PRA ANESTHESIA: ICD-10-PCS | Mod: CRNA,,, | Performed by: NURSE ANESTHETIST, CERTIFIED REGISTERED

## 2023-01-29 PROCEDURE — 63600175 PHARM REV CODE 636 W HCPCS: Performed by: INTERNAL MEDICINE

## 2023-01-29 PROCEDURE — 25000242 PHARM REV CODE 250 ALT 637 W/ HCPCS

## 2023-01-29 PROCEDURE — 21000000 HC CCU ICU ROOM CHARGE

## 2023-01-29 PROCEDURE — D9220A PRA ANESTHESIA: Mod: ANES,,, | Performed by: ANESTHESIOLOGY

## 2023-01-29 PROCEDURE — 99900035 HC TECH TIME PER 15 MIN (STAT)

## 2023-01-29 PROCEDURE — 84145 PROCALCITONIN (PCT): CPT | Performed by: INTERNAL MEDICINE

## 2023-01-29 PROCEDURE — 25000003 PHARM REV CODE 250: Performed by: INTERNAL MEDICINE

## 2023-01-29 RX ORDER — PROPOFOL 10 MG/ML
VIAL (ML) INTRAVENOUS
Status: DISCONTINUED | OUTPATIENT
Start: 2023-01-29 | End: 2023-01-29

## 2023-01-29 RX ORDER — KETAMINE HYDROCHLORIDE 50 MG/ML
INJECTION, SOLUTION INTRAMUSCULAR; INTRAVENOUS
Status: DISCONTINUED | OUTPATIENT
Start: 2023-01-29 | End: 2023-01-29

## 2023-01-29 RX ORDER — NITROGLYCERIN 0.4 MG/1
TABLET SUBLINGUAL
Status: COMPLETED
Start: 2023-01-29 | End: 2023-01-29

## 2023-01-29 RX ORDER — ALPRAZOLAM 0.5 MG/1
0.5 TABLET ORAL ONCE
Status: DISCONTINUED | OUTPATIENT
Start: 2023-01-29 | End: 2023-01-29

## 2023-01-29 RX ORDER — PANTOPRAZOLE SODIUM 40 MG/1
80 TABLET, DELAYED RELEASE ORAL DAILY
Status: DISCONTINUED | OUTPATIENT
Start: 2023-01-30 | End: 2023-02-02 | Stop reason: HOSPADM

## 2023-01-29 RX ADMIN — HYDRALAZINE HYDROCHLORIDE 50 MG: 25 TABLET ORAL at 02:01

## 2023-01-29 RX ADMIN — MUPIROCIN 1 G: 20 OINTMENT TOPICAL at 09:01

## 2023-01-29 RX ADMIN — CHOLESTYRAMINE LIGHT 4 G: 4 POWDER, FOR SUSPENSION ORAL at 09:01

## 2023-01-29 RX ADMIN — METOPROLOL TARTRATE 50 MG: 50 TABLET, FILM COATED ORAL at 02:01

## 2023-01-29 RX ADMIN — PROPOFOL 20 MG: 10 INJECTION, EMULSION INTRAVENOUS at 10:01

## 2023-01-29 RX ADMIN — PROPOFOL 10 MG: 10 INJECTION, EMULSION INTRAVENOUS at 11:01

## 2023-01-29 RX ADMIN — METOPROLOL TARTRATE 50 MG: 50 TABLET, FILM COATED ORAL at 09:01

## 2023-01-29 RX ADMIN — ALPRAZOLAM 0.5 MG: 0.5 TABLET ORAL at 09:01

## 2023-01-29 RX ADMIN — SODIUM CHLORIDE, SODIUM LACTATE, POTASSIUM CHLORIDE, AND CALCIUM CHLORIDE: .6; .31; .03; .02 INJECTION, SOLUTION INTRAVENOUS at 10:01

## 2023-01-29 RX ADMIN — NITROGLYCERIN: 0.4 TABLET SUBLINGUAL at 03:01

## 2023-01-29 RX ADMIN — ALPRAZOLAM 0.5 MG: 0.5 TABLET ORAL at 02:01

## 2023-01-29 RX ADMIN — KETAMINE HYDROCHLORIDE 10 MG: 50 INJECTION INTRAMUSCULAR; INTRAVENOUS at 10:01

## 2023-01-29 RX ADMIN — LEVOFLOXACIN 500 MG: 5 INJECTION, SOLUTION INTRAVENOUS at 07:01

## 2023-01-29 RX ADMIN — HYDRALAZINE HYDROCHLORIDE 50 MG: 25 TABLET ORAL at 09:01

## 2023-01-29 RX ADMIN — FUROSEMIDE 40 MG: 10 INJECTION, SOLUTION INTRAMUSCULAR; INTRAVENOUS at 09:01

## 2023-01-29 RX ADMIN — CEFTAROLINE FOSAMIL 200 MG: 400 POWDER, FOR SOLUTION INTRAVENOUS at 09:01

## 2023-01-29 RX ADMIN — TICAGRELOR 90 MG: 90 TABLET ORAL at 09:01

## 2023-01-29 NOTE — ANESTHESIA PREPROCEDURE EVALUATION
01/29/2023  Marisol Kerr is a 77 y.o., female.        Results for orders placed or performed during the hospital encounter of 01/27/23   EKG 12-lead    Collection Time: 01/27/23  7:14 AM    Narrative    Test Reason : R06.02,    Vent. Rate : 091 BPM     Atrial Rate : 092 BPM     P-R Int : 000 ms          QRS Dur : 106 ms      QT Int : 406 ms       P-R-T Axes : 000 053 040 degrees     QTc Int : 499 ms    Accelerated Junctional rhythm  Nonspecific ST and T wave abnormality  Prolonged QT  Abnormal ECG  When compared with ECG of 27-JAN-2023 07:09,  Junctional rhythm has replaced Sinus rhythm  ST less depressed in Lateral leads  T wave inversion less evident in Lateral leads    Referred By: [email or web address]   SELF           Confirmed By:         Imaging Results          X-Ray Chest AP Portable (Final result)  Result time 01/27/23 07:32:41    Final result by Flavio Lopez MD (01/27/23 07:32:41)                 Narrative:    Reason: CHF Shortness of breath    FINDINGS:  Portable chest at 712 compared with 1/17/2023 shows unchanged right subclavian central venous catheter tip near peripheral SVC. Previous right IJ catheter has been removed. Cardiomediastinal silhouette is within normal limits, given portable technique.    Patchy alveolar opacities are new in the right mid and lower lung zone. Blunting of the lateral right costophrenic angle suggests tiny right pleural effusion, new. Left lung remains clear. Pulmonary vasculature centrally is within normal limits. No pneumothorax.    No acute osseous abnormality.    IMPRESSION:  New patchy right mid and lower lung zone alveolar opacities with tiny right pleural effusion. Alveolar consolidation secondary to pneumonia is favored, although pulmonary edema could give a similar appearance.    Electronically signed by:  Flavio Lopez MD  1/27/2023 7:32 AM CST Workstation:  [id number]                               Lab Results   Component Value Date    WBC 12.07 01/29/2023    WBC 12.07 01/29/2023    HGB 8.1 (L) 01/29/2023    HGB 8.1 (L) 01/29/2023    HCT 26.8 (L) 01/29/2023    HCT 26.8 (L) 01/29/2023    MCV 98 01/29/2023    MCV 98 01/29/2023     01/29/2023     01/29/2023     BMP  Lab Results   Component Value Date     01/29/2023     01/29/2023    K 4.1 01/29/2023    K 4.1 01/29/2023     01/29/2023     01/29/2023    CO2 25 01/29/2023    CO2 25 01/29/2023    BUN 53 (H) 01/29/2023    BUN 53 (H) 01/29/2023    CREATININE 4.7 (H) 01/29/2023    CREATININE 4.7 (H) 01/29/2023    CALCIUM 8.2 (L) 01/29/2023    CALCIUM 8.2 (L) 01/29/2023    ANIONGAP 7 (L) 01/29/2023    ANIONGAP 7 (L) 01/29/2023    GLU 76 01/29/2023    GLU 76 01/29/2023     (H) 01/28/2023     (H) 01/28/2023       Results for orders placed during the hospital encounter of 01/15/23    Echo    Interpretation Summary  · The left ventricle is normal in size with moderate concentric hypertrophy and normal systolic function.  · Sinus tachycardia heart rate 110  · The estimated ejection fraction is 70%.  · Indeterminate left ventricular diastolic function with normal left atrial pressure.  · Atrial fibrillation not observed.  · Normal right ventricular size with normal right ventricular systolic function.  · Mild mitral regurgitation.  · Normal central venous pressure (3 mmHg).        Pre-op Assessment    I have reviewed the Patient Summary Reports.     I have reviewed the Nursing Notes. I have reviewed the NPO Status.   I have reviewed the Medications.     Review of Systems  Anesthesia Hx:  No problems with previous Anesthesia  Denies Family Hx of Anesthesia complications.   Denies Personal Hx of Anesthesia complications.   Social:  Former Smoker, Alcohol Use    Hematology/Oncology:         -- Anemia: --  Cancer in past history:  surgery, chemotherapy and radiation  Oncology Comments:  History of colon cancer    Adrenal adenoma     EENT/Dental:   [place] IRA   Cardiovascular:   Hypertension, poorly controlled Past MI CAD  CABG/stent  Angina CHF PVD ECG has been reviewed. Chronic diastolic congestive heart failure    Last NTG use 2 weeks ago     Patient followed by Dr. Chang    Pulmonary:   Pneumonia COPD, severe Asthma severe Shortness of breath COPD/emphysema    Chronic hypoxemic respiratory failure    Home Oxygen at 4 -5 L     Patient on BIPAP at this time    Pneumonia and Right Pleural Effusion noted on CXR    Renal/:   Chronic Renal Disease, ARF, CKD renal calculi    Hepatic/GI:   GERD    Musculoskeletal:   Arthritis  Osteoarthritis    Osteoporosis,     Gout Spine Disorders: cervical and lumbar Chronic Pain    Neurological:   Neuromuscular Disease,   Peripheral Neuropathy    Endocrine:   Diabetes, poorly controlled, type 2  Obesity / BMI > 30  Psych:   Psychiatric History depression Sleep Disorder and Insomnia. Sleep Disorder and Insomnia.        Physical Exam  General: Well nourished, Cooperative, Alert and Oriented    Airway:  Mallampati: III   Mouth Opening: Normal  TM Distance: > 6 cm  Tongue: Normal  Neck ROM: Extension Decreased    Dental:  Dentures, Edentulous    Chest/Lungs:  Clear to auscultation, Normal Respiratory Rate  Clear Anteriorly  Heart:  Rate: Normal  Rhythm: Regular Rhythm  Sounds: Normal        Anesthesia Plan  Type of Anesthesia, risks & benefits discussed:    Anesthesia Type: MAC  Intra-op Monitoring Plan: Standard ASA Monitors  Induction:  IV  Informed Consent: Informed consent signed with the Patient and all parties understand the risks and agree with anesthesia plan.  All questions answered.   ASA Score: 4  Anesthesia Plan Notes:     MAC with Propofol     POM Mask with GETA Standby     Ready For Surgery From Anesthesia Perspective.     .

## 2023-01-29 NOTE — CONSULTS
INPATIENT NEPHROLOGY CONSULT   [facility] NEPHROLOGY    Marisol Kerr  01/29/2023    Reason for consultation:    Acute kidney injury    Chief Complaint:   Chief Complaint   Patient presents with    Shortness of Breath          History of Present Illness:    Per H and P    77-year-old female presents emergency room with complaints of having severe shortness of breath that began 30 minutes prior to arrival.  The patient was transported to the ED by EMS.  She was placed on CPAP EN route.  The patient is normally on home O2 at 5 L and noted to have had an oxygen saturation of 80% at home.  Patient apparently lives at home alone and she apparently called EMS.  Patient has a prior history of CHF, COPD, colon cancer, coronary artery disease.  There were no reports of her complaining of chest pain or having any nausea vomiting.  The patient is in such respiratory extremis little other history is obtainable.    1/27  seen in the ER.   She states she feels a lot better after the lasix.  No nausea, chest pain, sob, fever, urinary or bowel complaint, new neurologic symptoms, new joint pain  1/28  1650 cc uop overnight.  AFVSS.  No nausea, chest pain, sob, fever, urinary or bowel complaint, new neurologic symptoms, new joint pain  1/29  just had EGD.  Still very groggy.  Output not appropriately recorded.        Plan of Care:       Assessment:    Acute kidney injury suspicious of hemodynamically mediated renal injury from heart failure  --getting lasix 40mg iv daily.  Hold if creatinine goes up (unless pt has dyspnea)  --Avoid NSAIDS, Solano II inhibitors, and other non-essential nephrotoxic agents  --strict I/O  --keep map above 55  --hold benazepril (her outpt med list has both lisinopril and benazepril listed.  Hold both.)      Pneumonia/pulmonary edema (BNP 1106)  --renal dose abx for crcl 10-50  --if she becomes oliguric change dosing to crcl 10  --no aminoglycosides     Anemia  --iron stores adequate  --transfuse  today  --epogen 10k sq given 1/28  --trend h/h  --paraproteins    Hypertension  --ok to continue amlodipine.  Not sure why she is on Amlodipine and diltiazem as an outpatient.  Ok to continue hydralazine.    --low salt diet       Secondary hyperparathyroidism  --pth level acceptable  --low phosphorus diet  --check vit d level          Thank you for allowing us to participate in this patient's care. We will continue to follow.    Vital Signs:  Temp Readings from Last 3 Encounters:   01/29/23 98.2 °F (36.8 °C) (Oral)   01/19/23 98 °F (36.7 °C) (Oral)   01/10/23 98.5 °F (36.9 °C) (Oral)       Pulse Readings from Last 3 Encounters:   01/29/23 76   01/24/23 62   01/19/23 70       BP Readings from Last 3 Encounters:   01/29/23 (!) 144/64   01/24/23 (!) 118/58   01/19/23 (!) 142/82       Weight:  Wt Readings from Last 3 Encounters:   01/27/23 83.2 kg (183 lb 6.8 oz)   01/24/23 83.1 kg (183 lb 3.2 oz)   01/16/23 84.3 kg (185 lb 13.6 oz)       Past Medical & Surgical History:  Past Medical History:   Diagnosis Date    CAD (coronary artery disease)     Cancer     colon    CHF (congestive heart failure)     Colitis     Colon cancer     COPD (chronic obstructive pulmonary disease)     Decreased hearing     Diabetes mellitus     Diabetes mellitus, type 2     Hypercholesterolemia     Hypertension     Hypoxemia     Insomnia     Obesity     Osteoarthritis        Past Surgical History:   Procedure Laterality Date    ANGIOGRAM, CORONARY, WITH LEFT HEART CATHETERIZATION N/A 2/10/2022    Procedure: Angiogram, Coronary, with Left Heart Cath;  Surgeon: Cristian Benjamin MD;  Location: [facility] CATH/EP LAB;  Service: Cardiology;  Laterality: N/A;    CARDIAC CATHETERIZATION      CHOLECYSTECTOMY      COLECTOMY      CORONARY ANGIOPLASTY      CORONARY STENT PLACEMENT      ERCP N/A 1/16/2023    Procedure: ERCP (ENDOSCOPIC RETROGRADE CHOLANGIOPANCREATOGRAPHY);  Surgeon: Biju Kramer III, MD;  Location: [facility] ENDO;  Service: Endoscopy;   Laterality: N/A;    EXTERNAL EAR SURGERY      EYE SURGERY      FLEXIBLE SIGMOIDOSCOPY N/A 2019    Procedure: SIGMOIDOSCOPY, FLEXIBLE;  Surgeon: Biju Kramer III, MD;  Location: [facility];  Service: Endoscopy;  Laterality: N/A;    HYSTERECTOMY      partial       Past Social History:  Social History     Socioeconomic History    Marital status:    Tobacco Use    Smoking status: Former     Packs/day: 3.00     Years: 50.00     Pack years: 150.00     Types: Cigarettes     Start date: 3/30/1964     Quit date: 2006     Years since quittin.9    Smokeless tobacco: Never    Tobacco comments:     ex smoker 15 years   Substance and Sexual Activity    Alcohol use: Yes    Drug use: No    Sexual activity: Never     Social Determinants of Health     Financial Resource Strain: Low Risk     Difficulty of Paying Living Expenses: Not hard at all   Food Insecurity: No Food Insecurity    Worried About Running Out of Food in the Last Year: Never true    Ran Out of Food in the Last Year: Never true   Transportation Needs: No Transportation Needs    Lack of Transportation (Medical): No    Lack of Transportation (Non-Medical): No   Physical Activity: Inactive    Days of Exercise per Week: 0 days    Minutes of Exercise per Session: 0 min   Stress: Stress Concern Present    Feeling of Stress : To some extent   Social Connections: Socially Isolated    Frequency of Communication with Friends and Family: More than three times a week    Frequency of Social Gatherings with Friends and Family: More than three times a week    Attends [employer] Services: Never    Active Member of Clubs or Organizations: No    Attends Club or Organization Meetings: Never    Marital Status:    Housing Stability: Low Risk     Unable to Pay for Housing in the Last Year: No    Number of Places Lived in the Last Year: 1    Unstable Housing in the Last Year: No       Medications:  No current facility-administered medications on file prior  to encounter.     Current Outpatient Medications on File Prior to Encounter   Medication Sig Dispense Refill    albuterol (VENTOLIN HFA) 90 mcg/actuation inhaler Inhale 2 puffs into the lungs every 6 (six) hours as needed for Wheezing or Shortness of Breath. Rescue 36 g 3    albuterol-ipratropium (DUO-NEB) 2.5 mg-0.5 mg/3 mL nebulizer solution Take 3 mLs by nebulization every 4 (four) hours as needed for Wheezing or Shortness of Breath. Rescue 120 each 6    allopurinoL (ZYLOPRIM) 100 MG tablet Take 0.5 tablets (50 mg total) by mouth once daily. 45 tablet 1    amLODIPine (NORVASC) 10 MG tablet Take 0.5 tablets (5 mg total) by mouth once daily. 15 tablet 11    aspirin (ECOTRIN) 81 MG EC tablet Take 1 tablet (81 mg total) by mouth every morning. 90 tablet 3    atorvastatin (LIPITOR) 40 MG tablet Take 1 tablet (40 mg total) by mouth once daily. 30 tablet 0    cholecalciferol, vitamin D3, 125 mcg (5,000 unit) Tab Take 5,000 Units by mouth once daily.      coenzyme Q10 100 mg capsule Take 100 mg by mouth every morning.      diltiaZEM (CARDIZEM CD) 120 MG Cp24 Take 1 capsule (120 mg total) by mouth once daily. 90 capsule 1    ergocalciferol (VITAMIN D2) 50,000 unit Cap Take 1 capsule (50,000 Units total) by mouth every 7 days. 12 capsule 1    ferrous sulfate 325 (65 FE) MG EC tablet Take 325 mg by mouth once daily.      fish oil-omega-3 fatty acids 300-1,000 mg capsule Take 2 capsules by mouth every morning.      furosemide (LASIX) 40 MG tablet Take 1 tablet (40 mg total) by mouth once daily. 30 tablet 0    hydrALAZINE (APRESOLINE) 50 MG tablet Take 1 tablet (50 mg total) by mouth 3 (three) times daily. 90 tablet 0    ipratropium-albuteroL (COMBIVENT RESPIMAT)  mcg/actuation inhaler Inhale 2 puffs into the lungs every 4 (four) hours as needed for Shortness of Breath. Rescue 12 g 3    isosorbide mononitrate (IMDUR) 120 MG 24 hr tablet Take 1 tablet (120 mg total) by mouth once daily. 90 tablet 1    metoprolol tartrate  (LOPRESSOR) 50 MG tablet Take 1 tablet (50 mg total) by mouth 3 (three) times daily. 90 tablet 0    pantoprazole (PROTONIX) 40 MG tablet Take 1 tablet (40 mg total) by mouth once daily. 90 tablet 1    polycarbophil (FIBERCON) 625 mg tablet Take 625 mg by mouth once daily.      ranolazine (RANEXA) 500 MG Tb12 Take 1 tablet (500 mg total) by mouth 2 (two) times daily. 180 tablet 1    ticagrelor (BRILINTA) 90 mg tablet Take 1 tablet (90 mg total) by mouth every 12 (twelve) hours. 60 tablet 0    vit C/E/Zn/coppr/lutein/zeaxan (PRESERVISION AREDS-2 ORAL) Take 1 tablet by mouth once daily.      cholestyramine (QUESTRAN) 4 gram packet Take 1 packet (4 g total) by mouth 2 (two) times daily. 180 packet 3    fluticasone-salmeterol diskus inhaler 250-50 mcg Inhale 1 puff into the lungs 2 (two) times daily. (Patient not taking: Reported on 1/24/2023) 3 each 1    gabapentin (NEURONTIN) 100 MG capsule Take 1 capsule (100 mg total) by mouth every evening. 90 capsule 1    nebulizer and compressor Reshma as directed 1 each 0    nitroGLYCERIN 0.4 MG/DOSE TL SPRY (NITROLINGUAL) 400 mcg/spray spray Place 1 spray under the tongue every 5 (five) minutes as needed for Chest pain (max of 3 doses). 4.9 g 1    umeclidinium (INCRUSE ELLIPTA) 62.5 mcg/actuation inhalation capsule Inhale 62.5 mcg into the lungs every morning. Controller (Patient not taking: Reported on 1/24/2023) 30 each 11    [DISCONTINUED] benazepriL (LOTENSIN) 40 MG tablet Take 1 tablet (40 mg total) by mouth once daily. 90 tablet 1    [DISCONTINUED] cimetidine (TAGAMET) 300 MG tablet Take 1 tablet (300 mg total) by mouth every 6 (six) hours. Take 1 tablet (300 mg total) by mouth starting at 6 PM on Sunday April 17, 2022 (the evening before your angiogram procedure). Then take 1 tablet at 12 AM, then take 1 tablet at 6 AM on Monday April 18th. 3 tablet 0    [DISCONTINUED] lisinopriL 10 MG tablet Take 1 tablet (10 mg total) by mouth once daily. HOLD UNTIL OTHERWISE DIRECTED BY  "PRIMARY CARE PROVIDER 90 tablet 3    [DISCONTINUED] lovastatin (MEVACOR) 40 MG tablet Take 1 tablet (40 mg total) by mouth every other day. 45 tablet 3     Scheduled Meds:   amLODIPine  5 mg Oral Daily    aspirin  81 mg Oral QAM    atorvastatin  40 mg Oral Daily    ceftaroline (TEFLARO) IVPB  200 mg Intravenous Q12H    chlorhexidine  15 mL Mouth/Throat BID    cholestyramine-aspartame  1 packet Oral BID    diltiaZEM  120 mg Oral Daily    furosemide (LASIX) injection  40 mg Intravenous Daily    hydrALAZINE  50 mg Oral TID    isosorbide mononitrate  120 mg Oral Daily    levoFLOXacin  500 mg Intravenous Q48H    metoprolol tartrate  50 mg Oral TID    mupirocin   Nasal BID    pantoprazole  40 mg Oral Daily    ticagrelor  90 mg Oral Q12H     Continuous Infusions:  PRN Meds:.sodium chloride, acetaminophen, albuterol-ipratropium, ALPRAZolam, nitroGLYCERIN 0.4 MG/DOSE TL SPRY    Allergies:  Iodinated contrast media and Strawberries [strawberry]    Past Family History:  Reviewed; refer to Hospitalist Admission Note    Review of Systems:  Review of Systems - All 14 systems reviewed and negative, except as noted in HPI    Physical Exam:    BP (!) 144/64   Pulse 76   Temp 98.2 °F (36.8 °C) (Oral)   Resp 18   Ht 5' 3" (1.6 m)   Wt 83.2 kg (183 lb 6.8 oz)   SpO2 100% Comment: 4l high flow nc  Breastfeeding No   BMI 32.49 kg/m²     General Appearance:    Alert, cooperative, no distress, appears stated age   Head:    Normocephalic, without obvious abnormality, atraumatic   Eyes:    PER, conjunctiva/corneas clear, EOM's intact in both eyes        Throat:   Lips, mucosa, and tongue normal; teeth and gums normal   Back:     Symmetric, no curvature, ROM normal, no CVA tenderness   Lungs:     Right sided crackles.  Diminished at the bases    Chest wall:    No tenderness or deformity   Heart:    Regular rate and rhythm, S1 and S2 normal, no murmur, rub   or gallop   Abdomen:     Soft, non-tender, bowel sounds active all four " quadrants,     no masses, no organomegaly   Extremities:   Extremities normal, atraumatic, no cyanosis or edema   Pulses:   2+ and symmetric all extremities   MSK:   No joint or muscle swelling, tenderness or deformity   Skin:   Skin color, texture, turgor normal, no rashes or lesions   Neurologic:   CNII-XII intact, normal strength and sensation       Throughout.  No flap     Results:  Lab Results   Component Value Date     01/29/2023     01/29/2023    K 4.1 01/29/2023    K 4.1 01/29/2023     01/29/2023     01/29/2023    CO2 25 01/29/2023    CO2 25 01/29/2023    BUN 53 (H) 01/29/2023    BUN 53 (H) 01/29/2023    CREATININE 4.7 (H) 01/29/2023    CREATININE 4.7 (H) 01/29/2023    CALCIUM 8.2 (L) 01/29/2023    CALCIUM 8.2 (L) 01/29/2023    ANIONGAP 7 (L) 01/29/2023    ANIONGAP 7 (L) 01/29/2023    ESTGFRAFRICA 39.0 (A) 06/30/2022    EGFRNONAA 33.8 (A) 06/30/2022       Lab Results   Component Value Date    CALCIUM 8.2 (L) 01/29/2023    CALCIUM 8.2 (L) 01/29/2023    PHOS 4.3 01/29/2023       Recent Labs   Lab 01/29/23  0319   WBC 12.07  12.07   RBC 2.74*  2.74*   HGB 8.1*  8.1*   HCT 26.8*  26.8*     211   MCV 98  98   MCH 29.6  29.6   MCHC 30.2*  30.2*            I have personally reviewed pertinent radiological imaging and reports.    Patient care was time spent personally by me on the following activities:   Obtaining a history  Examination of patient.  Providing medical care at the patients bedside.  Developing a treatment plan with patient or surrogate and bedside caregivers  Ordering and reviewing laboratory studies, radiographic studies, pulse oximetry.  Ordering and performing treatments and interventions.  Evaluation of patient's response to treatment.  Discussions with consultants while on the unit and immediately available to the patient.  Re-evaluation of the patient's condition.  Documentation in the medical record.     Augie Myrick MD  Nephrology  [place]  [facility] [place]  [phone]

## 2023-01-29 NOTE — PT/OT/SLP PROGRESS
Physical Therapy      Patient Name:  Marisol Kerr   MRN:  [id number]    Patient not seen today secondary to Off the floor for procedure/surgery. PT returned in PM but pt refused citing fatigue. Will follow-up 1/30/23.

## 2023-01-29 NOTE — PT/OT/SLP PROGRESS
Occupational Therapy      Patient Name:  Marisol Kerr   MRN:  [id number]    Patient not seen today secondary to  (pt on BIPAP). Will follow-up 1/30/2023.    1/29/2023

## 2023-01-29 NOTE — ANESTHESIA POSTPROCEDURE EVALUATION
Anesthesia Post Evaluation    Patient: Marisol Kerr    Procedure(s) Performed: Procedure(s) (LRB):  EGD (ESOPHAGOGASTRODUODENOSCOPY) (N/A)    Final Anesthesia Type: MAC      Patient location during evaluation: GI PACU  Patient participation: Yes- Able to Participate  Level of consciousness: awake and alert, oriented and awake  Post-procedure vital signs: reviewed and stable  Pain management: adequate  Airway patency: patent    PONV status at discharge: No PONV  Anesthetic complications: no      Cardiovascular status: blood pressure returned to baseline, hemodynamically stable and stable  Respiratory status: unassisted, spontaneous ventilation and nasal cannula  Hydration status: euvolemic  Follow-up not needed.          Vitals Value Taken Time   /62 01/29/23 1121   Temp 36.8 °C (98.2 °F) 01/29/23 1121   Pulse 86 01/29/23 1121   Resp 18 01/29/23 1121   SpO2 97 % 01/29/23 1121         No case tracking events are documented in the log.      Pain/Bandar Score: No data recorded

## 2023-01-29 NOTE — TRANSFER OF CARE
"Anesthesia Transfer of Care Note    Patient: Marisol Kerr    Procedure(s) Performed: Procedure(s) (LRB):  EGD (ESOPHAGOGASTRODUODENOSCOPY) (N/A)    Patient location: GI    Anesthesia Type: MAC    Transport from OR: Transported from OR on room air with adequate spontaneous ventilation    Post pain: adequate analgesia    Post assessment: no apparent anesthetic complications    Post vital signs: stable    Level of consciousness: responds to stimulation and awake    Nausea/Vomiting: no nausea/vomiting    Complications: none    Transfer of care protocol was followed      Last vitals:   Visit Vitals  /72   Pulse 81   Temp 36.6 °C (97.8 °F) (Axillary)   Resp 18   Ht 5' 3" (1.6 m)   Wt 83.2 kg (183 lb 6.8 oz)   SpO2 100%   Breastfeeding No   BMI 32.49 kg/m²     "

## 2023-01-29 NOTE — CARE UPDATE
01/29/23 0808   Patient Assessment/Suction   Level of Consciousness (AVPU) responds to voice   Respiratory Effort Normal;Unlabored   Expansion/Accessory Muscles/Retractions no use of accessory muscles;no retractions;expansion symmetric   All Lung Fields Breath Sounds clear;diminished   Rhythm/Pattern, Respiratory unlabored;pattern regular;depth regular;chest wiggle adequate;no shortness of breath reported   Cough Frequency no cough   Skin Integrity   $ Wound Care Tech Time 15 min   Area Observed Bridge of nose   Skin Appearance without discoloration   Barrier used? Gel Cushion   PRE-TX-O2   Device (Oxygen Therapy) BIPAP   $ Is the patient on Low Flow Oxygen? Yes   SpO2 98 %   Pulse Oximetry Type Continuous   $ Pulse Oximetry - Multiple Charge Pulse Oximetry - Multiple   Pulse 71   Resp 17   Aerosol Therapy   $ Aerosol Therapy Charges PRN treatment not required   Preset CPAP/BiPAP Settings   Mode Of Delivery BiPAP   $ CPAP/BiPAP Daily Charge BiPAP/CPAP Daily   $ Initial CPAP/BiPAP Setup? No   $ Is patient using? Yes   Size of Mask Small/Medium   Sized Appropriately? Yes   Equipment Type V60   Airway Device Type medium full face mask   Humidifier not applicable   Ipap 18   EPAP (cm H2O) 8   Pressure Support (cm H2O) 10   Set Rate (Breaths/Min) 12   Education   $ Education Bronchodilator;15 min   Respiratory Evaluation   $ Care Plan Tech Time 15 min

## 2023-01-29 NOTE — PROGRESS NOTES
Pulmonary/Critical Care  Progress Note      Patient name: Marisol Kerr  MRN: [id number]  Date: 01/29/2023    Admit Date: 1/27/2023  Consult Requested By: Dean Vanessa MD    Reason for Consult: COPD, acute respiratory failure    HPI:    1/28/2023 - Pt with h/o COPD (very severe FEV1 - 27%), chronic respiratory failure had recent admission and was DC home with lasix and came to ER with increased SOB, in ER was placed on BiPAP and admitted for further therapy.  She admits to increased SOB, + cough with some mucus, some chest pain which  she relates to cough.  She reports that her O2 at home is usually 4-5 but that her sats were decreased.    1/29/2023 - About the same and on BiPAP when I saw her, no new complaints.  BNP better, procalcitonin remains low, creatinine better.  CXR looks better this AM.    Review of Systems    Review of Systems   Constitutional:  Positive for malaise/fatigue. Negative for chills, diaphoresis, fever and weight loss.   HENT:  Negative for congestion.    Eyes:  Negative for pain.   Respiratory:  Positive for cough, sputum production and shortness of breath. Negative for hemoptysis, wheezing and stridor.    Cardiovascular:  Positive for leg swelling. Negative for chest pain, palpitations, orthopnea, claudication and PND.   Gastrointestinal:  Negative for abdominal pain, constipation, diarrhea, heartburn, nausea and vomiting.   Genitourinary:  Negative for dysuria, frequency and urgency.   Musculoskeletal:  Negative for falls and myalgias.   Neurological:  Positive for weakness. Negative for sensory change and focal weakness.   Psychiatric/Behavioral:  Negative for depression, substance abuse and suicidal ideas. The patient is not nervous/anxious.      Past Medical History    Past Medical History:   Diagnosis Date    CAD (coronary artery disease)     Cancer     colon    CHF (congestive heart failure)     Colitis     Colon cancer     COPD (chronic obstructive pulmonary disease)     Decreased hearing      Diabetes mellitus     Diabetes mellitus, type 2     Hypercholesterolemia     Hypertension     Hypoxemia     Insomnia     Obesity     Osteoarthritis        Past Surgical History    Past Surgical History:   Procedure Laterality Date    ANGIOGRAM, CORONARY, WITH LEFT HEART CATHETERIZATION N/A 2/10/2022    Procedure: Angiogram, Coronary, with Left Heart Cath;  Surgeon: Cristian Benjamin MD;  Location: [facility] CATH/EP LAB;  Service: Cardiology;  Laterality: N/A;    CARDIAC CATHETERIZATION      CHOLECYSTECTOMY      COLECTOMY      CORONARY ANGIOPLASTY      CORONARY STENT PLACEMENT      ERCP N/A 1/16/2023    Procedure: ERCP (ENDOSCOPIC RETROGRADE CHOLANGIOPANCREATOGRAPHY);  Surgeon: Biju Kramer III, MD;  Location: [facility] ENDO;  Service: Endoscopy;  Laterality: N/A;    EXTERNAL EAR SURGERY      EYE SURGERY      FLEXIBLE SIGMOIDOSCOPY N/A 9/19/2019    Procedure: SIGMOIDOSCOPY, FLEXIBLE;  Surgeon: Biju Kramer III, MD;  Location: [facility] ENDO;  Service: Endoscopy;  Laterality: N/A;    HYSTERECTOMY      partial       Medications (scheduled):      amLODIPine  5 mg Oral Daily    aspirin  81 mg Oral QAM    atorvastatin  40 mg Oral Daily    ceftaroline (TEFLARO) IVPB  200 mg Intravenous Q12H    chlorhexidine  15 mL Mouth/Throat BID    cholestyramine-aspartame  1 packet Oral BID    diltiaZEM  120 mg Oral Daily    furosemide (LASIX) injection  40 mg Intravenous Daily    hydrALAZINE  50 mg Oral TID    isosorbide mononitrate  120 mg Oral Daily    levoFLOXacin  500 mg Intravenous Q48H    metoprolol tartrate  50 mg Oral TID    mupirocin   Nasal BID    [START ON 1/30/2023] pantoprazole  80 mg Oral Daily    ticagrelor  90 mg Oral Q12H       Medications (infusions):         Medications (prn):     sodium chloride, acetaminophen, albuterol-ipratropium, ALPRAZolam, nitroGLYCERIN 0.4 MG/DOSE TL SPRY    Family History:   Family History   Problem Relation Age of Onset    Febrile seizures Mother     Heart failure Father        Social  "History: Tobacco:   Social History     Tobacco Use   Smoking Status Former    Packs/day: 3.00    Years: 50.00    Pack years: 150.00    Types: Cigarettes    Start date: 3/30/1964    Quit date: 2006    Years since quittin.9   Smokeless Tobacco Never   Tobacco Comments    ex smoker 15 years                                EtOH:   Social History     Substance and Sexual Activity   Alcohol Use Yes                                Drugs:   Social History     Substance and Sexual Activity   Drug Use No     Physical Exam    Vital signs:  Temp:  [97.1 °F (36.2 °C)-98.2 °F (36.8 °C)]   Pulse:  [52-86]   Resp:  [4-54]   BP: ()/(44-81)   SpO2:  [86 %-100 %]     Intake/Output:   Intake/Output Summary (Last 24 hours) at 2023 1245  Last data filed at 2023 1124  Gross per 24 hour   Intake 650 ml   Output 851 ml   Net -201 ml          BMI: Estimated body mass index is 32.49 kg/m² as calculated from the following:    Height as of this encounter: 5' 3" (1.6 m).    Weight as of this encounter: 83.2 kg (183 lb 6.8 oz).    Physical Exam  Vitals and nursing note reviewed.   Constitutional:       General: She is not in acute distress.     Appearance: Normal appearance. She is not ill-appearing, toxic-appearing or diaphoretic.      Comments: A bit scattered as a historian   HENT:      Head: Normocephalic and atraumatic.      Right Ear: External ear normal.      Left Ear: External ear normal.      Nose: Nose normal. No congestion or rhinorrhea.      Mouth/Throat:      Mouth: Mucous membranes are moist.      Pharynx: Oropharynx is clear. No oropharyngeal exudate or posterior oropharyngeal erythema.   Eyes:      General: No scleral icterus.        Right eye: No discharge.         Left eye: No discharge.      Extraocular Movements: Extraocular movements intact.      Conjunctiva/sclera: Conjunctivae normal.      Pupils: Pupils are equal, round, and reactive to light.   Neck:      Vascular: No carotid bruit. "   Cardiovascular:      Rate and Rhythm: Normal rate and regular rhythm.      Pulses: Normal pulses.      Heart sounds: No murmur heard.    No friction rub. No gallop.   Pulmonary:      Effort: Pulmonary effort is normal. No respiratory distress.      Breath sounds: No stridor. No wheezing, rhonchi or rales.      Comments: Tachypneic  No acc m use  Chest:      Chest wall: No tenderness.   Abdominal:      General: Bowel sounds are normal. There is no distension.      Tenderness: There is no abdominal tenderness. There is no guarding.   Musculoskeletal:         General: No swelling. Normal range of motion.      Cervical back: Normal range of motion and neck supple. No rigidity or tenderness.      Right lower leg: Edema present.      Left lower leg: Edema present.   Lymphadenopathy:      Cervical: No cervical adenopathy.   Skin:     General: Skin is warm and dry.      Capillary Refill: Capillary refill takes less than 2 seconds.      Coloration: Skin is not jaundiced.      Findings: Erythema (LLE) present. No bruising.   Neurological:      General: No focal deficit present.      Mental Status: She is alert and oriented to person, place, and time. Mental status is at baseline.      Cranial Nerves: No cranial nerve deficit.      Sensory: No sensory deficit.      Motor: Weakness present.   Psychiatric:         Mood and Affect: Mood normal.         Behavior: Behavior normal.         Thought Content: Thought content normal.         Judgment: Judgment normal.       Laboratory    Recent Labs   Lab 01/29/23 0319   WBC 12.07  12.07   RBC 2.74*  2.74*   HGB 8.1*  8.1*   HCT 26.8*  26.8*     211   MCV 98  98   MCH 29.6  29.6   MCHC 30.2*  30.2*         Recent Labs   Lab 01/29/23 0315   CALCIUM 8.2*  8.2*   PROT 5.5*     139   K 4.1  4.1   CO2 25  25     107   BUN 53*  53*   CREATININE 4.7*  4.7*   ALKPHOS 49*   ALT 11   AST 20   BILITOT 1.0         No results for input(s): PT, INR, APTT in the  last 24 hours.    No results for input(s): CPK, CPKMB, TROPONINI, MB in the last 24 hours.    Additional labs: reviewed    Microbiology:       Microbiology Results (last 7 days)       Procedure Component Value Units Date/Time    Blood culture #1 **CANNOT BE ORDERED STAT** [[id number]] Collected: 01/27/23 0720    Order Status: Completed Specimen: Blood from Peripheral, Forearm, Left Updated: 01/29/23 1032     Blood Culture, Routine No Growth to date      No Growth to date      No Growth to date    Blood culture #2 **CANNOT BE ORDERED STAT** [[id number]] Collected: 01/27/23 0745    Order Status: Completed Specimen: Blood from Peripheral, Antecubital, Right Updated: 01/29/23 1032     Blood Culture, Routine No Growth to date      No Growth to date      No Growth to date            Radiology    X-Ray Chest AP Portable  Chest single view    CLINICAL DATA: Shortness of breath, follow-up    FINDINGS: AP view is compared to January 27. The heart and mediastinum are unremarkable. Previously noted right basilar infiltrate appears improved. Minor residual atelectasis or infiltrate is noted. Right pleural effusion has improved, with decreasing blunting of the right costophrenic angle. There is a trace amount of pleural fluid on the left.    IMPRESSION:  1. Interval improvement, with decreasing right basilar infiltrate and small pleural effusion.  2. No other significant changes.    Electronically signed by:  Zohaib Lucio MD  1/29/2023 6:37 AM CST Workstation: [id number]        Additional Studies    reviewed    Ventilator Information    Oxygen Concentration (%):  [30] 30         Recent Labs     01/27/23  0725   PH 7.234*   PCO2 52.1*   PO2 271*   HCO3 22.0*   POCSATURATED 100   BE -5           Impression    Active Hospital Problems    Diagnosis  POA    *Acute respiratory failure [J96.00]  Unknown    Anemia [D64.9]  Unknown    Shortness of breath [R06.02]  Unknown    Acute pneumonia [J18.9]  Unknown    Acute on chronic  diastolic heart failure [I50.33]  Yes    ISSA (acute kidney injury) [N17.9]  Unknown    Chronic diastolic congestive heart failure [I50.32]  Yes    Chronic hypoxemic respiratory failure [J96.11]  Yes     3 L at home. Is on 3L during first day of admission.       Class 2 obesity in adult [E66.9]  Yes    CKD (chronic kidney disease), stage III [N18.30]  Yes    Coronary artery disease, multivessel with history of previous PCI [I25.10]  Yes    Essential hypertension, benign [I10]  Yes      Resolved Hospital Problems   No resolved problems to display.       Plan    BiPAP as needed, O2 as able  With low procalcitonin I would recommend de-escalating antibiotics  Prn respiratory treatments  May have cellulitis of LLE - will follow   Dimayline as able  Renal following for ISSA - better  Increase activity as able    Thank you for this consult.  I will follow with you while the patient is hospitalized.        Dereje Saul MD  [facility] Pulmonary/Critical Care  01/29/2023

## 2023-01-30 LAB
25(OH)D3+25(OH)D2 SERPL-MCNC: 57 NG/ML (ref 30–96)
ALBUMIN SERPL BCP-MCNC: 2.7 G/DL (ref 3.5–5.2)
ALLENS TEST: ABNORMAL
ALP SERPL-CCNC: 50 U/L (ref 55–135)
ALT SERPL W/O P-5'-P-CCNC: 12 U/L (ref 10–44)
ANION GAP SERPL CALC-SCNC: 6 MMOL/L (ref 8–16)
AST SERPL-CCNC: 18 U/L (ref 10–40)
BILIRUB SERPL-MCNC: 0.9 MG/DL (ref 0.1–1)
BUN SERPL-MCNC: 46 MG/DL (ref 8–23)
CALCIUM SERPL-MCNC: 8 MG/DL (ref 8.7–10.5)
CHLORIDE SERPL-SCNC: 105 MMOL/L (ref 95–110)
CO2 SERPL-SCNC: 27 MMOL/L (ref 23–29)
CREAT SERPL-MCNC: 3.8 MG/DL (ref 0.5–1.4)
DELSYS: ABNORMAL
ERYTHROCYTE [DISTWIDTH] IN BLOOD BY AUTOMATED COUNT: 15.2 % (ref 11.5–14.5)
EST. GFR  (NO RACE VARIABLE): 11.8 ML/MIN/1.73 M^2
FLOW: 3
GLUCOSE SERPL-MCNC: 74 MG/DL (ref 70–110)
HCO3 UR-SCNC: 29.5 MMOL/L (ref 24–28)
HCT VFR BLD AUTO: 30.3 % (ref 37–48.5)
HGB BLD-MCNC: 9.1 G/DL (ref 12–16)
MCH RBC QN AUTO: 29.4 PG (ref 27–31)
MCHC RBC AUTO-ENTMCNC: 30 G/DL (ref 32–36)
MCV RBC AUTO: 98 FL (ref 82–98)
MODE: ABNORMAL
PCO2 BLDA: 57.6 MMHG (ref 35–45)
PH SMN: 7.32 [PH] (ref 7.35–7.45)
PLATELET # BLD AUTO: 185 K/UL (ref 150–450)
PMV BLD AUTO: 11.8 FL (ref 9.2–12.9)
PO2 BLDA: 94 MMHG (ref 80–100)
POC BE: 3 MMOL/L
POC SATURATED O2: 96 % (ref 95–100)
POC TCO2: 31 MMOL/L (ref 23–27)
POTASSIUM SERPL-SCNC: 4.1 MMOL/L (ref 3.5–5.1)
PROT SERPL-MCNC: 5.6 G/DL (ref 6–8.4)
RBC # BLD AUTO: 3.1 M/UL (ref 4–5.4)
SAMPLE: ABNORMAL
SITE: ABNORMAL
SODIUM SERPL-SCNC: 138 MMOL/L (ref 136–145)
SP02: 100
WBC # BLD AUTO: 11.78 K/UL (ref 3.9–12.7)

## 2023-01-30 PROCEDURE — 99900035 HC TECH TIME PER 15 MIN (STAT)

## 2023-01-30 PROCEDURE — 63600175 PHARM REV CODE 636 W HCPCS: Mod: JG | Performed by: INTERNAL MEDICINE

## 2023-01-30 PROCEDURE — 94660 CPAP INITIATION&MGMT: CPT

## 2023-01-30 PROCEDURE — 94799 UNLISTED PULMONARY SVC/PX: CPT

## 2023-01-30 PROCEDURE — 99233 PR SUBSEQUENT HOSPITAL CARE,LEVL III: ICD-10-PCS | Mod: ,,, | Performed by: INTERNAL MEDICINE

## 2023-01-30 PROCEDURE — 82306 VITAMIN D 25 HYDROXY: CPT | Performed by: INTERNAL MEDICINE

## 2023-01-30 PROCEDURE — 97530 THERAPEUTIC ACTIVITIES: CPT

## 2023-01-30 PROCEDURE — 99233 SBSQ HOSP IP/OBS HIGH 50: CPT | Mod: ,,, | Performed by: INTERNAL MEDICINE

## 2023-01-30 PROCEDURE — 85027 COMPLETE CBC AUTOMATED: CPT | Performed by: INTERNAL MEDICINE

## 2023-01-30 PROCEDURE — 25000003 PHARM REV CODE 250: Performed by: INTERNAL MEDICINE

## 2023-01-30 PROCEDURE — 27000221 HC OXYGEN, UP TO 24 HOURS

## 2023-01-30 PROCEDURE — 94761 N-INVAS EAR/PLS OXIMETRY MLT: CPT

## 2023-01-30 PROCEDURE — 97162 PT EVAL MOD COMPLEX 30 MIN: CPT

## 2023-01-30 PROCEDURE — 82803 BLOOD GASES ANY COMBINATION: CPT

## 2023-01-30 PROCEDURE — 36415 COLL VENOUS BLD VENIPUNCTURE: CPT | Performed by: INTERNAL MEDICINE

## 2023-01-30 PROCEDURE — 99900031 HC PATIENT EDUCATION (STAT)

## 2023-01-30 PROCEDURE — 21000000 HC CCU ICU ROOM CHARGE

## 2023-01-30 PROCEDURE — 80053 COMPREHEN METABOLIC PANEL: CPT | Performed by: INTERNAL MEDICINE

## 2023-01-30 PROCEDURE — 36600 WITHDRAWAL OF ARTERIAL BLOOD: CPT

## 2023-01-30 RX ADMIN — PANTOPRAZOLE SODIUM 80 MG: 40 TABLET, DELAYED RELEASE ORAL at 05:01

## 2023-01-30 RX ADMIN — HYDRALAZINE HYDROCHLORIDE 50 MG: 25 TABLET ORAL at 11:01

## 2023-01-30 RX ADMIN — ATORVASTATIN CALCIUM 40 MG: 40 TABLET, FILM COATED ORAL at 11:01

## 2023-01-30 RX ADMIN — CHOLESTYRAMINE LIGHT 4 G: 4 POWDER, FOR SUSPENSION ORAL at 11:01

## 2023-01-30 RX ADMIN — METOPROLOL TARTRATE 50 MG: 50 TABLET, FILM COATED ORAL at 03:01

## 2023-01-30 RX ADMIN — TICAGRELOR 90 MG: 90 TABLET ORAL at 08:01

## 2023-01-30 RX ADMIN — ALPRAZOLAM 0.5 MG: 0.5 TABLET ORAL at 11:01

## 2023-01-30 RX ADMIN — MUPIROCIN 1 G: 20 OINTMENT TOPICAL at 11:01

## 2023-01-30 RX ADMIN — FUROSEMIDE 40 MG: 10 INJECTION, SOLUTION INTRAMUSCULAR; INTRAVENOUS at 11:01

## 2023-01-30 RX ADMIN — CEFTAROLINE FOSAMIL 200 MG: 400 POWDER, FOR SOLUTION INTRAVENOUS at 11:01

## 2023-01-30 RX ADMIN — METOPROLOL TARTRATE 50 MG: 50 TABLET, FILM COATED ORAL at 11:01

## 2023-01-30 RX ADMIN — HYDRALAZINE HYDROCHLORIDE 50 MG: 25 TABLET ORAL at 08:01

## 2023-01-30 RX ADMIN — CHOLESTYRAMINE LIGHT 4 G: 4 POWDER, FOR SUSPENSION ORAL at 08:01

## 2023-01-30 RX ADMIN — METOPROLOL TARTRATE 50 MG: 50 TABLET, FILM COATED ORAL at 08:01

## 2023-01-30 RX ADMIN — ASPIRIN 81 MG: 81 TABLET, COATED ORAL at 11:01

## 2023-01-30 RX ADMIN — CHLORHEXIDINE GLUCONATE 15 ML: 1.2 RINSE ORAL at 11:01

## 2023-01-30 RX ADMIN — CHLORHEXIDINE GLUCONATE 15 ML: 1.2 RINSE ORAL at 08:01

## 2023-01-30 RX ADMIN — MUPIROCIN 1 G: 20 OINTMENT TOPICAL at 08:01

## 2023-01-30 RX ADMIN — HYDRALAZINE HYDROCHLORIDE 50 MG: 25 TABLET ORAL at 03:01

## 2023-01-30 RX ADMIN — ISOSORBIDE MONONITRATE 120 MG: 60 TABLET, EXTENDED RELEASE ORAL at 11:01

## 2023-01-30 RX ADMIN — AMLODIPINE BESYLATE 5 MG: 5 TABLET ORAL at 11:01

## 2023-01-30 RX ADMIN — TICAGRELOR 90 MG: 90 TABLET ORAL at 11:01

## 2023-01-30 RX ADMIN — DILTIAZEM HYDROCHLORIDE 120 MG: 120 CAPSULE, COATED, EXTENDED RELEASE ORAL at 11:01

## 2023-01-30 NOTE — PLAN OF CARE
01/30/23 0916   Patient Assessment/Suction   Respiratory Effort Normal;Unlabored   Expansion/Accessory Muscles/Retractions no use of accessory muscles   All Lung Fields Breath Sounds diminished;clear   Skin Integrity   $ Wound Care Tech Time 15 min   Area Observed Bridge of nose   Skin Appearance without discoloration   Barrier used? Gel Cushion   PRE-TX-O2   Device (Oxygen Therapy) nasal cannula with humidification   $ Is the patient on Low Flow Oxygen? Yes   Flow (L/min) 4   SpO2 100 %   Pulse Oximetry Type Continuous   $ Pulse Oximetry - Multiple Charge Pulse Oximetry - Multiple   Pulse 74   Resp 20   Aerosol Therapy   $ Aerosol Therapy Charges PRN treatment not required   Respiratory Treatment Status (SVN) PRN treatment not required   Preset CPAP/BiPAP Settings   Mode Of Delivery BiPAP S/T   $ CPAP/BiPAP Daily Charge BiPAP/CPAP Daily   $ Initial CPAP/BiPAP Setup? No   Equipment Type V60   Ipap 18   EPAP (cm H2O) 8   Pressure Support (cm H2O) 10   Set Rate (Breaths/Min) 12   ITime (sec) 1   Rise Time (sec) 3   Education   $ Education Bronchodilator;15 min;BiPAP   Respiratory Evaluation   $ Care Plan Tech Time 15 min

## 2023-01-30 NOTE — PT/OT/SLP EVAL
Physical Therapy Evaluation    Patient Name:  Marisol Kerr   MRN:  [id number]    Recommendations:     Discharge Recommendations: nursing facility, skilled   Discharge Equipment Recommendations: none   Barriers to discharge: Decreased caregiver support    Assessment:     Marisol Kerr is a 77 y.o. female admitted with a medical diagnosis of Acute respiratory failure.  She presents with the following impairments/functional limitations: weakness, impaired endurance, impaired self care skills, impaired functional mobility, gait instability, impaired cardiopulmonary response to activity .    Rehab Prognosis: Good; patient would benefit from acute skilled PT services to address these deficits and reach maximum level of function.    Recent Surgery: Procedure(s) (LRB):  EGD (ESOPHAGOGASTRODUODENOSCOPY) (N/A) 1 Day Post-Op    Plan:     During this hospitalization, patient to be seen 6 x/week to address the identified rehab impairments via gait training, therapeutic activities, therapeutic exercises and progress toward the following goals:    Plan of Care Expires:  02/28/23    Subjective     Chief Complaint: CARDOZA  Patient/Family Comments/goals: PLOF  Pain/Comfort:  Pain Rating 1: 0/10    Patients cultural, spiritual, [employer]ism conflicts given the current situation:      Living Environment:  Pt lives at home alone in a [facility] with threshold entrance. Her daughter runs pt's errands and checks on pt daily.  Prior to admission, patients level of function was Mod I .  Equipment used at home: cane, straight, walker, rolling, oxygen.  DME owned (not currently used): none.  Upon discharge, patient will have assistance from her daughter/facility.    Objective:     Communicated with nurse prior to session.  Patient found supine with telemetry, pulse ox (continuous), oxygen  upon PT entry to room.    General Precautions: Standard, fall  Orthopedic Precautions:    Braces:    Respiratory Status: Nasal cannula, flow 3 L/min with 02 sat of  100%    Exams:  Cognitive Exam:  Patient is oriented to Person, Place, Time, and Situation  RLE ROM: WFL  RLE Strength: 3/5  LLE ROM: WFL  LLE Strength: 3/5    Functional Mobility:  Bed Mobility:     Supine to Sit: moderate assistance and of 2 persons  Sit to Supine: moderate assistance and of 2 persons  Transfers:     Sit to Stand:  minimum assistance and of 2 persons with rolling walker  Balance: unsupported sitting balance  , Min A for standing balance with RW      AM-PAC 6 CLICK MOBILITY  Total Score:        Treatment & Education:  Pt was educated on the role of PT  for assessment, treatment with emphasis on safety and increased mobility and D/C  recommendations.     Patient left supine with all lines intact, call button in reach, and bed alarm on.    GOALS:   Multidisciplinary Problems       Physical Therapy Goals          Problem: Physical Therapy    Goal Priority Disciplines Outcome Goal Variances Interventions   Physical Therapy Goal     PT, PT/OT      Description: Goals to be met by: 2023    Patient will increase functional independence with mobility by performin. Supine to sit with MInimal Assistance  2. Sit to stand transfer with Minimal Assistance  3. Gait  x 50  feet with Contact Guard Assistance using Rolling Walker.                          History:     Past Medical History:   Diagnosis Date    CAD (coronary artery disease)     Cancer     colon    CHF (congestive heart failure)     Colitis     Colon cancer     COPD (chronic obstructive pulmonary disease)     Decreased hearing     Diabetes mellitus     Diabetes mellitus, type 2     Hypercholesterolemia     Hypertension     Hypoxemia     Insomnia     Obesity     Osteoarthritis        Past Surgical History:   Procedure Laterality Date    ANGIOGRAM, CORONARY, WITH LEFT HEART CATHETERIZATION N/A 2/10/2022    Procedure: Angiogram, Coronary, with Left Heart Cath;  Surgeon: Cristian Benjamin MD;  Location: [facility] CATH/EP LAB;  Service:  Cardiology;  Laterality: N/A;    CARDIAC CATHETERIZATION      CHOLECYSTECTOMY      COLECTOMY      CORONARY ANGIOPLASTY      CORONARY STENT PLACEMENT      ERCP N/A 1/16/2023    Procedure: ERCP (ENDOSCOPIC RETROGRADE CHOLANGIOPANCREATOGRAPHY);  Surgeon: Biju Kramer III, MD;  Location: [facility];  Service: Endoscopy;  Laterality: N/A;    EXTERNAL EAR SURGERY      EYE SURGERY      FLEXIBLE SIGMOIDOSCOPY N/A 9/19/2019    Procedure: SIGMOIDOSCOPY, FLEXIBLE;  Surgeon: Biju Kramer III, MD;  Location: [facility];  Service: Endoscopy;  Laterality: N/A;    HYSTERECTOMY      partial       Time Tracking:     PT Received On: 01/30/23  PT Start Time: 1130     PT Stop Time: 1145  PT Total Time (min): 15 min     Billable Minutes: Evaluation 7 minutes and Therapeutic Activity 8 minutes      01/30/2023

## 2023-01-30 NOTE — CARE UPDATE
01/29/23 2245   Patient Assessment/Suction   Level of Consciousness (AVPU) alert   Respiratory Effort Unlabored   Expansion/Accessory Muscles/Retractions no retractions   All Lung Fields Breath Sounds clear;diminished   Rhythm/Pattern, Respiratory assisted mechanically   Cough Frequency no cough   PRE-TX-O2   Device (Oxygen Therapy) BIPAP   $ Is the patient on Low Flow Oxygen? Yes   Oxygen Concentration (%) 30   SpO2 96 %   Pulse Oximetry Type Continuous   $ Pulse Oximetry - Multiple Charge Pulse Oximetry - Multiple   Pulse 64   Resp (!) 21   Positioning   Head of Bed (HOB) Positioning HOB elevated;HOB at 30-45 degrees   Ready to Wean/Extubation Screen   FIO2<=50 (chart decimal) 0.3   Preset CPAP/BiPAP Settings   Mode Of Delivery BiPAP S/T   $ Is patient using? Yes   Size of Mask Small/Medium   Sized Appropriately? Yes   Equipment Type V60   Airway Device Type medium full face mask   Humidifier not applicable   Ipap 18   EPAP (cm H2O) 8   Pressure Support (cm H2O) 10   Set Rate (Breaths/Min) 12   ITime (sec) 1   Rise Time (sec) 3   Patient CPAP/BiPAP Settings   FiO2 Auto Set yes   RR Total (Breaths/Min) 28   Tidal Volume (mL) 396   VE Minute Ventilation (L/min) 9 L/min   Peak Inspiratory Pressure (cm H2O) 15   TiTOT (%) 33   Total Leak (L/Min) 10   Patient Trigger - ST Mode Only (%) 99   Education   $ Education Bronchodilator;15 min   Respiratory Evaluation   $ Care Plan Tech Time 15 min   $ Eval/Re-eval Charges Re-evaluation

## 2023-01-30 NOTE — PROGRESS NOTES
[facility]  Progress Note    Patient Name: Marisol Kerr  MRN: [id number]  Patient Class: IP- Inpatient   Admission Date: 1/27/2023  Length of Stay: 2 days  Attending Physician: Dean Vanessa MD  Primary Care Provider: Khadar Dwyer MD        Subjective:     Principal Problem:Acute respiratory failure        HPI:  77 year old patient getting admitted with acute on chronic respiratory failure due to R sided Pneumonia and possible acute CHF flare  Pt was in this hospital very recently and went home with Lasix as per instructions from Nephrology MD   She started having Cough and shortness of breath 2 days ago  Today symptoms went worse and she came to ER , was put on BIPAP and got admitted  She was also found to be on ISSA with CKD        Overview/Hospital Course:  1/28  Got pRBC  FOBT positive  EGD in AM  Continously having chest pain which is normal for her  Received Xanax for anxiety episodes       1/29  Overall better  Had EGD today   Family thinks pt need rehab  Chronic chest pain is a normal thing for this pt  Also anxiety episodes      Interval History:     Review of Systems   Constitutional:  Positive for fatigue. Negative for activity change and appetite change.   HENT:  Negative for congestion and dental problem.    Eyes:  Negative for discharge and itching.   Respiratory:  Negative for shortness of breath.    Cardiovascular:  Negative for chest pain.   Gastrointestinal:  Negative for abdominal distention and abdominal pain.   Endocrine: Negative for cold intolerance.   Genitourinary:  Negative for difficulty urinating and dysuria.   Musculoskeletal:  Negative for arthralgias and back pain.   Skin:  Negative for color change.   Neurological:  Negative for dizziness and facial asymmetry.   Hematological:  Negative for adenopathy.   Psychiatric/Behavioral:  Negative for agitation and behavioral problems.    Objective:     Vital Signs (Most Recent):  Temp: 97.8 °F (36.6 °C) (01/29/23  1500)  Pulse: 83 (01/29/23 1700)  Resp: (!) 21 (01/29/23 1700)  BP: (!) 111/55 (01/29/23 1700)  SpO2: 99 % (01/29/23 1700)   Vital Signs (24h Range):  Temp:  [97.1 °F (36.2 °C)-98.2 °F (36.8 °C)] 97.8 °F (36.6 °C)  Pulse:  [52-86] 83  Resp:  [4-33] 21  SpO2:  [86 %-100 %] 99 %  BP: (109-159)/(50-81) 111/55     Weight: 83.2 kg (183 lb 6.8 oz)  Body mass index is 32.49 kg/m².    Intake/Output Summary (Last 24 hours) at 1/29/2023 1949  Last data filed at 1/29/2023 1900  Gross per 24 hour   Intake 910 ml   Output 1951 ml   Net -1041 ml      Physical Exam  Vitals and nursing note reviewed.   Constitutional:       General: She is not in acute distress.  HENT:      Head: Atraumatic.      Right Ear: External ear normal.      Left Ear: External ear normal.      Nose: Nose normal.      Mouth/Throat:      Mouth: Mucous membranes are moist.   Cardiovascular:      Rate and Rhythm: Normal rate.   Pulmonary:      Effort: Pulmonary effort is normal.   Abdominal:      Palpations: Abdomen is soft.   Musculoskeletal:         General: Normal range of motion.      Cervical back: Normal range of motion.   Skin:     General: Skin is warm.   Neurological:      Mental Status: She is alert and oriented to person, place, and time.   Psychiatric:         Behavior: Behavior normal.       Significant Labs: All pertinent labs within the past 24 hours have been reviewed.  CBC:   Recent Labs   Lab 01/28/23  0540 01/28/23  1921 01/29/23  0319   WBC 8.67  8.67 12.19 12.07  12.07   HGB 6.8*  6.8* 7.8* 8.1*  8.1*   HCT 23.2*  23.2* 25.9* 26.8*  26.8*     209 226 211  211     CMP:   Recent Labs   Lab 01/28/23  0540 01/29/23  0315     138 139  139   K 5.0  5.0 4.1  4.1     108 107  107   CO2 23  23 25  25   *  118* 76  76   BUN 55*  55* 53*  53*   CREATININE 5.4*  5.4* 4.7*  4.7*   CALCIUM 8.2*  8.2* 8.2*  8.2*   PROT 5.7* 5.5*   ALBUMIN 2.8*  2.8* 2.8*  2.8*   BILITOT 0.7 1.0   ALKPHOS 50* 49*   AST 23  20   ALT 12 11   ANIONGAP 7*  7* 7*  7*       Significant Imaging: I have reviewed all pertinent imaging results/findings within the past 24 hours.      Assessment/Plan:      * Acute respiratory failure  Acute on chronic respiratory failure due to Pneumonia/CHF flare   Maintain iv abx/Diuretics     Acute pneumonia  Maintain iv abx for suspected  aspiration and MRSA pneumonia     Coronary artery disease, multivessel with history of previous PCI  Significant coronary disease with blockages in the right coronary and circumflex mid LAD (per recent angiogram;coronary)  Recently had subendocardial ischemia and associated chest pain on 1/17  Medical management at the moment   Continue Brilinta  Pt regularly uses NG spray which is normal for her       ISSA (acute kidney injury)  ISSA on CKD  Consult Nephro MD  Maintain iv lasix  Mostly has Cardiorenal syndrome  Poor prognosis        Shortness of breath  As above  Chronic issue       Anemia  S/p pRBC on 1/28/2023  FOBT positive  EGD on 1/29 showed Non-bleeding duodenal diverticulum and Chronic erosive gastritis with hemorrhage  Maintain PPI      Acute on chronic diastolic heart failure  Maintain Iv lasix       Chronic diastolic congestive heart failure  Aware     Chronic hypoxemic respiratory failure  On home oxygen     Class 2 obesity in adult  Body mass index is 32.49 kg/m². Morbid obesity complicates all aspects of disease management from diagnostic modalities to treatment.          CKD (chronic kidney disease), stage III  Presently has ISSA on CKD 3      Essential hypertension, benign  Continue present regime        VTE Risk Mitigation (From admission, onward)         Ordered     IP VTE HIGH RISK PATIENT  Once         01/27/23 1333     Place sequential compression device  Until discontinued         01/27/23 1333                Discharge Planning   LUZ:      Code Status: Full Code   Is the patient medically ready for discharge?:     Reason for patient still in hospital  (select all that apply): Treatment and Pending disposition  Discharge Plan A: Home Health, Home                  Dean Vanessa MD  Department of Hospital Medicine   Formerly [facility]

## 2023-01-30 NOTE — PT/OT/SLP PROGRESS
Occupational Therapy      Patient Name:  Marisol Kerr   MRN:  [id number]    Patient not seen today secondary to Other (Comment) (patient is back on BIPAP this afternnoon per nurse). Will follow-up in the morning.    1/30/2023

## 2023-01-30 NOTE — SUBJECTIVE & OBJECTIVE
Interval History:     Review of Systems   Constitutional:  Positive for fatigue. Negative for activity change and appetite change.   HENT:  Negative for congestion and dental problem.    Eyes:  Negative for discharge and itching.   Respiratory:  Negative for shortness of breath.    Cardiovascular:  Negative for chest pain.   Gastrointestinal:  Negative for abdominal distention and abdominal pain.   Endocrine: Negative for cold intolerance.   Genitourinary:  Negative for difficulty urinating and dysuria.   Musculoskeletal:  Negative for arthralgias and back pain.   Skin:  Negative for color change.   Neurological:  Negative for dizziness and facial asymmetry.   Hematological:  Negative for adenopathy.   Psychiatric/Behavioral:  Negative for agitation and behavioral problems.    Objective:     Vital Signs (Most Recent):  Temp: 97.8 °F (36.6 °C) (01/29/23 1500)  Pulse: 83 (01/29/23 1700)  Resp: (!) 21 (01/29/23 1700)  BP: (!) 111/55 (01/29/23 1700)  SpO2: 99 % (01/29/23 1700)   Vital Signs (24h Range):  Temp:  [97.1 °F (36.2 °C)-98.2 °F (36.8 °C)] 97.8 °F (36.6 °C)  Pulse:  [52-86] 83  Resp:  [4-33] 21  SpO2:  [86 %-100 %] 99 %  BP: (109-159)/(50-81) 111/55     Weight: 83.2 kg (183 lb 6.8 oz)  Body mass index is 32.49 kg/m².    Intake/Output Summary (Last 24 hours) at 1/29/2023 1949  Last data filed at 1/29/2023 1900  Gross per 24 hour   Intake 910 ml   Output 1951 ml   Net -1041 ml      Physical Exam  Vitals and nursing note reviewed.   Constitutional:       General: She is not in acute distress.  HENT:      Head: Atraumatic.      Right Ear: External ear normal.      Left Ear: External ear normal.      Nose: Nose normal.      Mouth/Throat:      Mouth: Mucous membranes are moist.   Cardiovascular:      Rate and Rhythm: Normal rate.   Pulmonary:      Effort: Pulmonary effort is normal.   Abdominal:      Palpations: Abdomen is soft.   Musculoskeletal:         General: Normal range of motion.      Cervical back: Normal  range of motion.   Skin:     General: Skin is warm.   Neurological:      Mental Status: She is alert and oriented to person, place, and time.   Psychiatric:         Behavior: Behavior normal.       Significant Labs: All pertinent labs within the past 24 hours have been reviewed.  CBC:   Recent Labs   Lab 01/28/23  0540 01/28/23  1921 01/29/23  0319   WBC 8.67  8.67 12.19 12.07  12.07   HGB 6.8*  6.8* 7.8* 8.1*  8.1*   HCT 23.2*  23.2* 25.9* 26.8*  26.8*     209 226 211  211     CMP:   Recent Labs   Lab 01/28/23  0540 01/29/23  0315     138 139  139   K 5.0  5.0 4.1  4.1     108 107  107   CO2 23  23 25  25   *  118* 76  76   BUN 55*  55* 53*  53*   CREATININE 5.4*  5.4* 4.7*  4.7*   CALCIUM 8.2*  8.2* 8.2*  8.2*   PROT 5.7* 5.5*   ALBUMIN 2.8*  2.8* 2.8*  2.8*   BILITOT 0.7 1.0   ALKPHOS 50* 49*   AST 23 20   ALT 12 11   ANIONGAP 7*  7* 7*  7*       Significant Imaging: I have reviewed all pertinent imaging results/findings within the past 24 hours.

## 2023-01-30 NOTE — PROGRESS NOTES
Pulmonary/Critical Care  Progress Note      Patient name: Marisol Kerr  MRN: [id number]  Date: 01/30/2023    Admit Date: 1/27/2023  Consult Requested By: Dean Vanessa MD    Reason for Consult: COPD, acute respiratory failure    HPI:    1/28/2023 - Pt with h/o COPD (very severe FEV1 - 27%), chronic respiratory failure had recent admission and was DC home with lasix and came to ER with increased SOB, in ER was placed on BiPAP and admitted for further therapy.  She admits to increased SOB, + cough with some mucus, some chest pain which  she relates to cough.  She reports that her O2 at home is usually 4-5 but that her sats were decreased.    1/29/2023 - About the same and on BiPAP when I saw her, no new complaints.  BNP better, procalcitonin remains low, creatinine better.  CXR looks better this AM.    1/30 the patient is off of her BiPAP.  She is on nasal cannula.  She can not communicate as she does not have her hearing aids in.  The nurse reports increasing confusion.  I spoke with her daughter who expects her to live for several more years alone in her home if she just has some rehab.    Review of Systems  Unobtainable    Past Medical History    Past Medical History:   Diagnosis Date    CAD (coronary artery disease)     Cancer     colon    CHF (congestive heart failure)     Colitis     Colon cancer     COPD (chronic obstructive pulmonary disease)     Decreased hearing     Diabetes mellitus     Diabetes mellitus, type 2     Hypercholesterolemia     Hypertension     Hypoxemia     Insomnia     Obesity     Osteoarthritis        Past Surgical History    Past Surgical History:   Procedure Laterality Date    ANGIOGRAM, CORONARY, WITH LEFT HEART CATHETERIZATION N/A 2/10/2022    Procedure: Angiogram, Coronary, with Left Heart Cath;  Surgeon: Cristian Benjamin MD;  Location: [facility] CATH/EP LAB;  Service: Cardiology;  Laterality: N/A;    CARDIAC CATHETERIZATION      CHOLECYSTECTOMY      COLECTOMY      CORONARY ANGIOPLASTY       CORONARY STENT PLACEMENT      ERCP N/A 2023    Procedure: ERCP (ENDOSCOPIC RETROGRADE CHOLANGIOPANCREATOGRAPHY);  Surgeon: Biju Kramer III, MD;  Location: [facility] ENDO;  Service: Endoscopy;  Laterality: N/A;    ESOPHAGOGASTRODUODENOSCOPY N/A 2023    Procedure: EGD (ESOPHAGOGASTRODUODENOSCOPY);  Surgeon: Aarti Alvarez MD;  Location: [facility] ENDO;  Service: Endoscopy;  Laterality: N/A;    EXTERNAL EAR SURGERY      EYE SURGERY      FLEXIBLE SIGMOIDOSCOPY N/A 2019    Procedure: SIGMOIDOSCOPY, FLEXIBLE;  Surgeon: Biju Kramer III, MD;  Location: [facility] ENDO;  Service: Endoscopy;  Laterality: N/A;    HYSTERECTOMY      partial       Medications (scheduled):      amLODIPine  5 mg Oral Daily    aspirin  81 mg Oral QAM    atorvastatin  40 mg Oral Daily    ceftaroline (TEFLARO) IVPB  200 mg Intravenous Q12H    chlorhexidine  15 mL Mouth/Throat BID    cholestyramine-aspartame  1 packet Oral BID    diltiaZEM  120 mg Oral Daily    furosemide (LASIX) injection  40 mg Intravenous Daily    hydrALAZINE  50 mg Oral TID    isosorbide mononitrate  120 mg Oral Daily    levoFLOXacin  500 mg Intravenous Q48H    metoprolol tartrate  50 mg Oral TID    mupirocin   Nasal BID    pantoprazole  80 mg Oral Daily    ticagrelor  90 mg Oral Q12H       Medications (infusions):         Medications (prn):     sodium chloride, acetaminophen, albuterol-ipratropium, ALPRAZolam, nitroGLYCERIN 0.4 MG/DOSE TL SPRY    Family History:   Family History   Problem Relation Age of Onset    Febrile seizures Mother     Heart failure Father        Social History: Tobacco:   Social History     Tobacco Use   Smoking Status Former    Packs/day: 3.00    Years: 50.00    Pack years: 150.00    Types: Cigarettes    Start date: 3/30/1964    Quit date: 2006    Years since quittin.9   Smokeless Tobacco Never   Tobacco Comments    ex smoker 15 years                                EtOH:   Social History     Substance and Sexual Activity  "  Alcohol Use Yes                                Drugs:   Social History     Substance and Sexual Activity   Drug Use No     Physical Exam    Vital signs:  Temp:  [97.5 °F (36.4 °C)-98.2 °F (36.8 °C)]   Pulse:  [64-91]   Resp:  [16-35]   BP: (117-176)/(53-79)   SpO2:  [95 %-100 %]     Intake/Output:   Intake/Output Summary (Last 24 hours) at 1/30/2023 1703  Last data filed at 1/30/2023 1700  Gross per 24 hour   Intake 580 ml   Output 4050 ml   Net -3470 ml        BMI: Estimated body mass index is 31.79 kg/m² as calculated from the following:    Height as of this encounter: 5' 3" (1.6 m).    Weight as of this encounter: 81.4 kg (179 lb 7.3 oz).    PHYSICAL EXAM  GENERAL: Older patient in no distress.  Unable to communicate with her is she does not have her hearing aids  HEENT: Pupils equal and reactive. Extraocular movements intact. Nose intact.  Pharynx moist.  NECK: Supple.   HEART: Regular rate and rhythm. No murmur or gallop auscultated.  LUNGS: Clear to auscultation and percussion. Lung excursion symmetrical. No change in fremitus. No adventitial noises.  ABDOMEN: Bowel sounds present. Non-tender, no masses palpated.  Obese.  : Normal anatomy.  EXTREMITIES: Normal muscle tone and joint movement, no cyanosis or clubbing.   LYMPHATICS: No adenopathy palpated, legs are very wrinkled from diuresis.    SKIN: Dry, intact, no lesions. There is pretibial erythema from chronic venous stasis.  NEURO: Cranial nerves II-XII intact. Motor strength 5/5 bilaterally, upper and lower extremities.  PSYCH: Appropriate affect.      Laboratory    Recent Labs   Lab 01/30/23  0537   WBC 11.78   RBC 3.10*   HGB 9.1*   HCT 30.3*      MCV 98   MCH 29.4   MCHC 30.0*       Recent Labs   Lab 01/30/23  0537   CALCIUM 8.0*   PROT 5.6*      K 4.1   CO2 27      BUN 46*   CREATININE 3.8*   ALKPHOS 50*   ALT 12   AST 18   BILITOT 0.9           Microbiology:       Microbiology Results (last 7 days)       Procedure Component " Value Units Date/Time    Blood culture #2 **CANNOT BE ORDERED STAT** [[id number]] Collected: 01/27/23 0745    Order Status: Completed Specimen: Blood from Peripheral, Antecubital, Right Updated: 01/30/23 1032     Blood Culture, Routine No Growth to date      No Growth to date      No Growth to date      No Growth to date    Blood culture #1 **CANNOT BE ORDERED STAT** [[id number]] Collected: 01/27/23 0720    Order Status: Completed Specimen: Blood from Peripheral, Forearm, Left Updated: 01/30/23 1032     Blood Culture, Routine No Growth to date      No Growth to date      No Growth to date      No Growth to date            Radiology    X-Ray Chest AP Portable  Chest single view    CLINICAL DATA: Shortness of breath, follow-up    FINDINGS: AP view is compared to January 27. The heart and mediastinum are unremarkable. Previously noted right basilar infiltrate appears improved. Minor residual atelectasis or infiltrate is noted. Right pleural effusion has improved, with decreasing blunting of the right costophrenic angle. There is a trace amount of pleural fluid on the left.    IMPRESSION:  1. Interval improvement, with decreasing right basilar infiltrate and small pleural effusion.  2. No other significant changes.    Electronically signed by:  Zohaib Lucio MD  1/29/2023 6:37 AM CST Workstation: [id number]          Oxygen Information    Oxygen Concentration (%):  [30] 30         Recent Labs     01/30/23  1133   PH 7.317*   PCO2 57.6*   PO2 94   HCO3 29.5*   POCSATURATED 96   BE 3         Impression    Active Hospital Problems    Diagnosis  POA    *Acute respiratory failure [J96.00]  Unknown    Congestive heart failure [I50.9]  Yes    Debility [R53.81]  Yes    Anemia [D64.9]  Unknown    Shortness of breath [R06.02]  Unknown    Acute pneumonia [J18.9]  Unknown    Acute on chronic diastolic heart failure [I50.33]  Yes    ISSA (acute kidney injury) [N17.9]  Unknown    Chronic diastolic congestive heart failure [I50.32]   Yes    Chronic hypoxemic respiratory failure [J96.11]  Yes     3 L at home. Is on 3L during first day of admission.       Class 2 obesity in adult [E66.9]  Yes    CKD (chronic kidney disease), stage III [N18.30]  Yes    Coronary artery disease, multivessel with history of previous PCI [I25.10]  Yes    Essential hypertension, benign [I10]  Yes      Resolved Hospital Problems   No resolved problems to display.   Acute on chronic hypercapnic hypoxemic respiratory failure  Persisting partially compensated respiratory acidosis  Bilateral pleural effusions  End-stage COPD  Obesity hypoventilation syndrome  Diastolic heart failure  Chronic venous stasis changes to the lower legs  Anemia of chronic disease  Acute on chronic renal failure  Moderate hypoalbuminemia/ obesity  Anxiety    Plan    BiPAP for 16-18 hours a day  Continue scheduled bronchodilators with Prn respiratory treatments   Deescalate antibiotics, DC Teflaro  Diurese as able  Renal following for ISSA   Increase activity as able  Patient will either need an LTAC or rehab on discharge  Asked daughter to bring in her hearing aid so that we can communicate

## 2023-01-30 NOTE — ASSESSMENT & PLAN NOTE
S/p pRBC on 1/28/2023  FOBT positive  EGD on 1/29 showed Non-bleeding duodenal diverticulum and Chronic erosive gastritis with hemorrhage  Maintain PPI

## 2023-01-30 NOTE — PROGRESS NOTES
INPATIENT NEPHROLOGY Progress Note  [facility] NEPHROLOGY    Marisol Kerr  01/30/2023    Reason for consultation:    Acute kidney injury    Chief Complaint:   Chief Complaint   Patient presents with    Shortness of Breath          History of Present Illness:    Per H and P    77-year-old female presents emergency room with complaints of having severe shortness of breath that began 30 minutes prior to arrival.  The patient was transported to the ED by EMS.  She was placed on CPAP EN route.  The patient is normally on home O2 at 5 L and noted to have had an oxygen saturation of 80% at home.  Patient apparently lives at home alone and she apparently called EMS.  Patient has a prior history of CHF, COPD, colon cancer, coronary artery disease.  There were no reports of her complaining of chest pain or having any nausea vomiting.  The patient is in such respiratory extremis little other history is obtainable.    1/27  seen in the ER.   She states she feels a lot better after the lasix.  No nausea, chest pain, sob, fever, urinary or bowel complaint, new neurologic symptoms, new joint pain  1/28  1650 cc uop overnight.  AFVSS.  No nausea, chest pain, sob, fever, urinary or bowel complaint, new neurologic symptoms, new joint pain  1/29  just had EGD.  Still very groggy.  Output not appropriately recorded.    1/30 VSS, on 4L NC, UOP 2.7L, feeling better, on/off BIPAP    Plan of Care:     Acute kidney injury suspicious of hemodynamically mediated renal injury from heart failure with CRS  - renal function is improving with IV diuresis- will continue    Pneumonia/pulmonary edema (BNP 1106)  - renal dose abx for crcl < 20  - continue lasix 40mg IV daily     Anemia  - iron stores adequate  - transfused 1/29  - epogen 10k sq given 1/28  - paraproteins pending    Hypertension  - controlled on ISMN/hydral  - hold RAAS agents  - will touch base with hospitalist about being on norvasc and cardizem  - added renal diet with 1.5L fluid  restriction     Secondary hyperparathyroidism  - parameters are at goal    Thank you for allowing us to participate in this patient's care. We will continue to follow.    Vital Signs:  Temp Readings from Last 3 Encounters:   01/30/23 97.9 °F (36.6 °C) (Axillary)   01/19/23 98 °F (36.7 °C) (Oral)   01/10/23 98.5 °F (36.9 °C) (Oral)       Pulse Readings from Last 3 Encounters:   01/30/23 89   01/24/23 62   01/19/23 70       BP Readings from Last 3 Encounters:   01/30/23 (!) 164/66   01/24/23 (!) 118/58   01/19/23 (!) 142/82       Weight:  Wt Readings from Last 3 Encounters:   01/30/23 81.4 kg (179 lb 7.3 oz)   01/24/23 83.1 kg (183 lb 3.2 oz)   01/16/23 84.3 kg (185 lb 13.6 oz)     Medications:  No current facility-administered medications on file prior to encounter.     Current Outpatient Medications on File Prior to Encounter   Medication Sig Dispense Refill    albuterol (VENTOLIN HFA) 90 mcg/actuation inhaler Inhale 2 puffs into the lungs every 6 (six) hours as needed for Wheezing or Shortness of Breath. Rescue 36 g 3    albuterol-ipratropium (DUO-NEB) 2.5 mg-0.5 mg/3 mL nebulizer solution Take 3 mLs by nebulization every 4 (four) hours as needed for Wheezing or Shortness of Breath. Rescue 120 each 6    allopurinoL (ZYLOPRIM) 100 MG tablet Take 0.5 tablets (50 mg total) by mouth once daily. 45 tablet 1    amLODIPine (NORVASC) 10 MG tablet Take 0.5 tablets (5 mg total) by mouth once daily. 15 tablet 11    aspirin (ECOTRIN) 81 MG EC tablet Take 1 tablet (81 mg total) by mouth every morning. 90 tablet 3    atorvastatin (LIPITOR) 40 MG tablet Take 1 tablet (40 mg total) by mouth once daily. 30 tablet 0    cholecalciferol, vitamin D3, 125 mcg (5,000 unit) Tab Take 5,000 Units by mouth once daily.      coenzyme Q10 100 mg capsule Take 100 mg by mouth every morning.      diltiaZEM (CARDIZEM CD) 120 MG Cp24 Take 1 capsule (120 mg total) by mouth once daily. 90 capsule 1    ergocalciferol (VITAMIN D2) 50,000 unit Cap Take 1  capsule (50,000 Units total) by mouth every 7 days. 12 capsule 1    ferrous sulfate 325 (65 FE) MG EC tablet Take 325 mg by mouth once daily.      fish oil-omega-3 fatty acids 300-1,000 mg capsule Take 2 capsules by mouth every morning.      furosemide (LASIX) 40 MG tablet Take 1 tablet (40 mg total) by mouth once daily. 30 tablet 0    hydrALAZINE (APRESOLINE) 50 MG tablet Take 1 tablet (50 mg total) by mouth 3 (three) times daily. 90 tablet 0    ipratropium-albuteroL (COMBIVENT RESPIMAT)  mcg/actuation inhaler Inhale 2 puffs into the lungs every 4 (four) hours as needed for Shortness of Breath. Rescue 12 g 3    isosorbide mononitrate (IMDUR) 120 MG 24 hr tablet Take 1 tablet (120 mg total) by mouth once daily. 90 tablet 1    metoprolol tartrate (LOPRESSOR) 50 MG tablet Take 1 tablet (50 mg total) by mouth 3 (three) times daily. 90 tablet 0    pantoprazole (PROTONIX) 40 MG tablet Take 1 tablet (40 mg total) by mouth once daily. 90 tablet 1    polycarbophil (FIBERCON) 625 mg tablet Take 625 mg by mouth once daily.      ranolazine (RANEXA) 500 MG Tb12 Take 1 tablet (500 mg total) by mouth 2 (two) times daily. 180 tablet 1    ticagrelor (BRILINTA) 90 mg tablet Take 1 tablet (90 mg total) by mouth every 12 (twelve) hours. 60 tablet 0    vit C/E/Zn/coppr/lutein/zeaxan (PRESERVISION AREDS-2 ORAL) Take 1 tablet by mouth once daily.      cholestyramine (QUESTRAN) 4 gram packet Take 1 packet (4 g total) by mouth 2 (two) times daily. 180 packet 3    fluticasone-salmeterol diskus inhaler 250-50 mcg Inhale 1 puff into the lungs 2 (two) times daily. (Patient not taking: Reported on 1/24/2023) 3 each 1    gabapentin (NEURONTIN) 100 MG capsule Take 1 capsule (100 mg total) by mouth every evening. 90 capsule 1    nebulizer and compressor Reshma as directed 1 each 0    nitroGLYCERIN 0.4 MG/DOSE TL SPRY (NITROLINGUAL) 400 mcg/spray spray Place 1 spray under the tongue every 5 (five) minutes as needed for Chest pain (max of 3  "doses). 4.9 g 1    umeclidinium (INCRUSE ELLIPTA) 62.5 mcg/actuation inhalation capsule Inhale 62.5 mcg into the lungs every morning. Controller (Patient not taking: Reported on 1/24/2023) 30 each 11    [DISCONTINUED] benazepriL (LOTENSIN) 40 MG tablet Take 1 tablet (40 mg total) by mouth once daily. 90 tablet 1    [DISCONTINUED] cimetidine (TAGAMET) 300 MG tablet Take 1 tablet (300 mg total) by mouth every 6 (six) hours. Take 1 tablet (300 mg total) by mouth starting at 6 PM on Sunday April 17, 2022 (the evening before your angiogram procedure). Then take 1 tablet at 12 AM, then take 1 tablet at 6 AM on Monday April 18th. 3 tablet 0    [DISCONTINUED] lisinopriL 10 MG tablet Take 1 tablet (10 mg total) by mouth once daily. HOLD UNTIL OTHERWISE DIRECTED BY PRIMARY CARE PROVIDER 90 tablet 3    [DISCONTINUED] lovastatin (MEVACOR) 40 MG tablet Take 1 tablet (40 mg total) by mouth every other day. 45 tablet 3     Scheduled Meds:   amLODIPine  5 mg Oral Daily    aspirin  81 mg Oral QAM    atorvastatin  40 mg Oral Daily    ceftaroline (TEFLARO) IVPB  200 mg Intravenous Q12H    chlorhexidine  15 mL Mouth/Throat BID    cholestyramine-aspartame  1 packet Oral BID    diltiaZEM  120 mg Oral Daily    furosemide (LASIX) injection  40 mg Intravenous Daily    hydrALAZINE  50 mg Oral TID    isosorbide mononitrate  120 mg Oral Daily    levoFLOXacin  500 mg Intravenous Q48H    metoprolol tartrate  50 mg Oral TID    mupirocin   Nasal BID    pantoprazole  80 mg Oral Daily    ticagrelor  90 mg Oral Q12H     Continuous Infusions:  PRN Meds:.sodium chloride, acetaminophen, albuterol-ipratropium, ALPRAZolam, nitroGLYCERIN 0.4 MG/DOSE TL SPRY    Review of Systems:  Neg    Physical Exam:    BP (!) 164/66   Pulse 89   Temp 97.9 °F (36.6 °C) (Axillary)   Resp 16   Ht 5' 3" (1.6 m)   Wt 81.4 kg (179 lb 7.3 oz)   SpO2 100%   Breastfeeding No   BMI 31.79 kg/m²     Constitutional: nad, aao x 3  Heart: rrr, no m/r/g, wwp, + edema  Lungs: " coarse, no w/r/r/c, no lb  Abdomen: s/nt/nd, +BS    Results:  Lab Results   Component Value Date     01/30/2023    K 4.1 01/30/2023     01/30/2023    CO2 27 01/30/2023    BUN 46 (H) 01/30/2023    CREATININE 3.8 (H) 01/30/2023    CALCIUM 8.0 (L) 01/30/2023    ANIONGAP 6 (L) 01/30/2023    ESTGFRAFRICA 39.0 (A) 06/30/2022    EGFRNONAA 33.8 (A) 06/30/2022       Lab Results   Component Value Date    CALCIUM 8.0 (L) 01/30/2023    PHOS 4.3 01/29/2023       Recent Labs   Lab 01/30/23  0537   WBC 11.78   RBC 3.10*   HGB 9.1*   HCT 30.3*      MCV 98   MCH 29.4   MCHC 30.0*            I have personally reviewed pertinent radiological imaging and reports.    Patient care was time spent personally by me on the following activities: > 35 min  Obtaining a history  Examination of patient.  Providing medical care at the patients bedside.  Developing a treatment plan with patient or surrogate and bedside caregivers  Ordering and reviewing laboratory studies, radiographic studies, pulse oximetry.  Ordering and performing treatments and interventions.  Evaluation of patient's response to treatment.  Discussions with consultants while on the unit and immediately available to the patient.  Re-evaluation of the patient's condition.  Documentation in the medical record.     Barbi Huggins MD MPH  [facility]  [address] [place] [id number]  [phone] (p)  [phone] (f)

## 2023-01-30 NOTE — PLAN OF CARE
SW sent patient information to [facility], [facility], and [facility] for SNF placement via [facility] system.       01/30/23 1506   Post-Acute Status   Post-Acute Authorization Placement   Post-Acute Placement Status Referrals Sent   Patient choice form signed by patient/caregiver List with quality metrics by geographic area provided

## 2023-01-31 LAB
ALBUMIN SERPL BCP-MCNC: 2.8 G/DL (ref 3.5–5.2)
ALP SERPL-CCNC: 49 U/L (ref 55–135)
ALT SERPL W/O P-5'-P-CCNC: 11 U/L (ref 10–44)
ANION GAP SERPL CALC-SCNC: 9 MMOL/L (ref 8–16)
AST SERPL-CCNC: 14 U/L (ref 10–40)
BILIRUB SERPL-MCNC: 0.9 MG/DL (ref 0.1–1)
BUN SERPL-MCNC: 43 MG/DL (ref 8–23)
CALCIUM SERPL-MCNC: 8.3 MG/DL (ref 8.7–10.5)
CHLORIDE SERPL-SCNC: 104 MMOL/L (ref 95–110)
CO2 SERPL-SCNC: 30 MMOL/L (ref 23–29)
CREAT SERPL-MCNC: 3.3 MG/DL (ref 0.5–1.4)
ERYTHROCYTE [DISTWIDTH] IN BLOOD BY AUTOMATED COUNT: 14.8 % (ref 11.5–14.5)
EST. GFR  (NO RACE VARIABLE): 14 ML/MIN/1.73 M^2
GLUCOSE SERPL-MCNC: 79 MG/DL (ref 70–110)
HCT VFR BLD AUTO: 30.8 % (ref 37–48.5)
HGB BLD-MCNC: 9.3 G/DL (ref 12–16)
MCH RBC QN AUTO: 29.6 PG (ref 27–31)
MCHC RBC AUTO-ENTMCNC: 30.2 G/DL (ref 32–36)
MCV RBC AUTO: 98 FL (ref 82–98)
PLATELET # BLD AUTO: 183 K/UL (ref 150–450)
PMV BLD AUTO: 11.9 FL (ref 9.2–12.9)
POTASSIUM SERPL-SCNC: 4 MMOL/L (ref 3.5–5.1)
PROT SERPL-MCNC: 5.6 G/DL (ref 6–8.4)
RBC # BLD AUTO: 3.14 M/UL (ref 4–5.4)
SODIUM SERPL-SCNC: 143 MMOL/L (ref 136–145)
WBC # BLD AUTO: 8.62 K/UL (ref 3.9–12.7)

## 2023-01-31 PROCEDURE — 63600175 PHARM REV CODE 636 W HCPCS: Performed by: INTERNAL MEDICINE

## 2023-01-31 PROCEDURE — 97110 THERAPEUTIC EXERCISES: CPT | Mod: CQ

## 2023-01-31 PROCEDURE — 25000003 PHARM REV CODE 250: Performed by: INTERNAL MEDICINE

## 2023-01-31 PROCEDURE — 99232 SBSQ HOSP IP/OBS MODERATE 35: CPT | Mod: ,,, | Performed by: INTERNAL MEDICINE

## 2023-01-31 PROCEDURE — 36415 COLL VENOUS BLD VENIPUNCTURE: CPT | Performed by: INTERNAL MEDICINE

## 2023-01-31 PROCEDURE — 94660 CPAP INITIATION&MGMT: CPT

## 2023-01-31 PROCEDURE — 97530 THERAPEUTIC ACTIVITIES: CPT

## 2023-01-31 PROCEDURE — 97165 OT EVAL LOW COMPLEX 30 MIN: CPT

## 2023-01-31 PROCEDURE — 94761 N-INVAS EAR/PLS OXIMETRY MLT: CPT

## 2023-01-31 PROCEDURE — 99232 PR SUBSEQUENT HOSPITAL CARE,LEVL II: ICD-10-PCS | Mod: ,,, | Performed by: INTERNAL MEDICINE

## 2023-01-31 PROCEDURE — 27000221 HC OXYGEN, UP TO 24 HOURS

## 2023-01-31 PROCEDURE — 80053 COMPREHEN METABOLIC PANEL: CPT | Performed by: INTERNAL MEDICINE

## 2023-01-31 PROCEDURE — 99900035 HC TECH TIME PER 15 MIN (STAT)

## 2023-01-31 PROCEDURE — 21000000 HC CCU ICU ROOM CHARGE

## 2023-01-31 PROCEDURE — 99900031 HC PATIENT EDUCATION (STAT)

## 2023-01-31 PROCEDURE — 94799 UNLISTED PULMONARY SVC/PX: CPT

## 2023-01-31 PROCEDURE — 85027 COMPLETE CBC AUTOMATED: CPT | Performed by: INTERNAL MEDICINE

## 2023-01-31 RX ADMIN — ISOSORBIDE MONONITRATE 120 MG: 60 TABLET, EXTENDED RELEASE ORAL at 09:01

## 2023-01-31 RX ADMIN — ATORVASTATIN CALCIUM 40 MG: 40 TABLET, FILM COATED ORAL at 09:01

## 2023-01-31 RX ADMIN — ALPRAZOLAM 0.5 MG: 0.5 TABLET ORAL at 09:01

## 2023-01-31 RX ADMIN — TICAGRELOR 90 MG: 90 TABLET ORAL at 09:01

## 2023-01-31 RX ADMIN — CHLORHEXIDINE GLUCONATE 15 ML: 1.2 RINSE ORAL at 09:01

## 2023-01-31 RX ADMIN — CHLORHEXIDINE GLUCONATE 15 ML: 1.2 RINSE ORAL at 10:01

## 2023-01-31 RX ADMIN — AMLODIPINE BESYLATE 5 MG: 5 TABLET ORAL at 09:01

## 2023-01-31 RX ADMIN — LEVOFLOXACIN 500 MG: 5 INJECTION, SOLUTION INTRAVENOUS at 08:01

## 2023-01-31 RX ADMIN — FUROSEMIDE 40 MG: 10 INJECTION, SOLUTION INTRAMUSCULAR; INTRAVENOUS at 09:01

## 2023-01-31 RX ADMIN — PANTOPRAZOLE SODIUM 80 MG: 40 TABLET, DELAYED RELEASE ORAL at 05:01

## 2023-01-31 RX ADMIN — HYDRALAZINE HYDROCHLORIDE 50 MG: 25 TABLET ORAL at 09:01

## 2023-01-31 RX ADMIN — MUPIROCIN 1 G: 20 OINTMENT TOPICAL at 09:01

## 2023-01-31 RX ADMIN — CHOLESTYRAMINE LIGHT 4 G: 4 POWDER, FOR SUSPENSION ORAL at 09:01

## 2023-01-31 RX ADMIN — METOPROLOL TARTRATE 50 MG: 50 TABLET, FILM COATED ORAL at 09:01

## 2023-01-31 RX ADMIN — DILTIAZEM HYDROCHLORIDE 120 MG: 120 CAPSULE, COATED, EXTENDED RELEASE ORAL at 09:01

## 2023-01-31 RX ADMIN — ASPIRIN 81 MG: 81 TABLET, COATED ORAL at 09:01

## 2023-01-31 NOTE — PROGRESS NOTES
"[facility]  Adult Nutrition   Progress Note (Initial Assessment)     SUMMARY     Recommendations  1.) Continue renal diet restrictions, fluid per MD.   2.) Will add Carlo BID x14 days to aid in wound healing.   3.) Monitor renal function.   4.)  to aid in food preferences.    Goals:   1.) Pt to consume/tolerate >75% of meals.   2.) Progression of wound healing.   3.) Renal function to return to baseline or standard reference range.  Nutrition Goal Status: new    Dietitian Rounds Brief  Pt presents emergency room with complaints of having severe shortness of breath that began 30 minutes prior to arrival. Pt with ISSA, however improving with diuresis. Pt with fair appetite, consuming 50-75% of meals. S/p EGD, ERCP with small hiatal hernia. No plans for surgical interventions at this time. Skin: Sacral wound .5 round 3.5 cm deep tunneling toward the 6oclock position when probing with q tip it feels to be bone exposed per wound care note. Will send Carlo to aid in wound healing. Pt agreeable. No chew/swallowing issues. No GI distress. LBM 1/30. Wt reviewed. NFPE completed with no s/s of wasting.    Diet order:   Current Diet Order: renal, 1500mL fluid restriction     Evaluation of Received Nutrient/Fluid Intake  Energy Calories Required: meeting needs  Protein Required: meeting needs  Fluid Required: meeting needs  Tolerance: tolerating     % Intake of Estimated Energy Needs: 50 - 75 %  % Meal Intake: 50 - 75 %      Intake/Output Summary (Last 24 hours) at 1/31/2023 1726  Last data filed at 1/31/2023 0601  Gross per 24 hour   Intake 480 ml   Output 500 ml   Net -20 ml        Anthropometrics  Temp: 97.5 °F (36.4 °C)  Height Method: Estimated  Height: 5' 3" (160 cm)  Height (inches): 63 in  Weight Method: Bed Scale  Weight: 81.4 kg (179 lb 7.3 oz)  Weight (lb): 179.46 lb  Ideal Body Weight (IBW), Female: 115 lb  % Ideal Body Weight, Female (lb): 159.5 %  BMI (Calculated): 31.8  BMI Grade: 30 - 34.9- " obesity - grade I  Usual Body Weight (UBW), k kg (2022)  % Usual Body Weight: 101.96       Estimated/Assessed Needs  Weight Used For Calorie Calculations: 81.4 kg (179 lb 7.3 oz)  Energy Calorie Requirements (kcal):   Energy Need Method: Kcal/kg (20-25)  Protein Requirements: 49-65  Weight Used For Protein Calculations: 81.4 kg (179 lb 7.3 oz) (0.6-0.8)  Fluid Requirements (mL):   Estimated Fluid Requirement Method: RDA Method  RDA Method (mL):        Reason for Assessment  Reason For Assessment: length of stay  Diagnosis: pulmonary disease  Relevant Medical History: CHF, COPD, colon cancer, coronary artery disease    Nutrition/Diet History  Spiritual, Cultural Beliefs, Mosque Practices, Values that Affect Care: no  Food Allergies: other (see comments) (strawberries)  Factors Affecting Nutritional Intake: None identified at this time    Nutrition Risk Screen  Nutrition Risk Screen: no indicators present       Altered Skin Integrity 23 Right Heel Other (comment) Purple or maroon localized area of discolored intact skin or non-intact skin or a blood-filled blister.-Wound Image: Images linked       Altered Skin Integrity 23 Coccyx #1 Other (comment) Partial thickness tissue loss. Shallow open ulcer with a red or pink wound bed, without slough. Intact or Open/Ruptured Serum-filled blister.-Wound Image: Images linked  MST Score: 0  Have you recently lost weight without trying?: No  Weight loss score: 0  Have you been eating poorly because of a decreased appetite?: No  Appetite score: 0       Weight History:  Wt Readings from Last 5 Encounters:   23 81.4 kg (179 lb 7.3 oz)   23 83.1 kg (183 lb 3.2 oz)   23 84.3 kg (185 lb 13.6 oz)   23 83.9 kg (185 lb)   01/10/23 79.7 kg (175 lb 12.8 oz)        Lab/Procedures/Meds: Pertinent Labs/Meds Reviewed    Medications:Pertinent Medications Reviewed  Scheduled Meds:   amLODIPine  5 mg Oral Daily    aspirin   81 mg Oral QAM    atorvastatin  40 mg Oral Daily    chlorhexidine  15 mL Mouth/Throat BID    cholestyramine-aspartame  1 packet Oral BID    diltiaZEM  120 mg Oral Daily    furosemide (LASIX) injection  40 mg Intravenous Daily    hydrALAZINE  50 mg Oral TID    isosorbide mononitrate  120 mg Oral Daily    levoFLOXacin  500 mg Intravenous Q48H    metoprolol tartrate  50 mg Oral TID    mupirocin   Nasal BID    pantoprazole  80 mg Oral Daily    ticagrelor  90 mg Oral Q12H     Continuous Infusions:  PRN Meds:.sodium chloride, acetaminophen, albuterol-ipratropium, ALPRAZolam, nitroGLYCERIN 0.4 MG/DOSE TL SPRY    Labs: Pertinent Labs Reviewed  Clinical Chemistry:  Recent Labs   Lab 01/28/23  0540 01/29/23  0315 01/30/23  0537 01/31/23  0555     138 139  139   < > 143   K 5.0  5.0 4.1  4.1   < > 4.0     108 107  107   < > 104   CO2 23  23 25  25   < > 30*   *  118* 76  76   < > 79   BUN 55*  55* 53*  53*   < > 43*   CREATININE 5.4*  5.4* 4.7*  4.7*   < > 3.3*   CALCIUM 8.2*  8.2* 8.2*  8.2*   < > 8.3*   PROT 5.7* 5.5*   < > 5.6*   ALBUMIN 2.8*  2.8* 2.8*  2.8*   < > 2.8*   BILITOT 0.7 1.0   < > 0.9   ALKPHOS 50* 49*   < > 49*   AST 23 20   < > 14   ALT 12 11   < > 11   ANIONGAP 7*  7* 7*  7*   < > 9   PHOS 5.9* 4.3  --   --     < > = values in this interval not displayed.     CBC:   Recent Labs   Lab 01/31/23  0555   WBC 8.62   RBC 3.14*   HGB 9.3*   HCT 30.8*      MCV 98   MCH 29.6   MCHC 30.2*     Cardiac Profile:  Recent Labs   Lab 01/27/23  0728 01/29/23  0319   BNP 1,106* 847*       Monitor and Evaluation  Food and Nutrient Intake: energy intake, food and beverage intake  Food and Nutrient Adminstration: diet order  Knowledge/Beliefs/Attitudes: food and nutrition knowledge/skill  Physical Activity and Function: nutrition-related ADLs and IADLs  Anthropometric Measurements: weight, weight change  Biochemical Data, Medical Tests and Procedures: electrolyte and renal panel,  gastrointestinal profile, glucose/endocrine profile, other (specify) (renal profile)  Nutrition-Focused Physical Findings: overall appearance     Nutrition Risk  Level of Risk/Frequency of Follow-up: low     Nutrition Follow-Up  RD Follow-up?: Yes      Ronda Veras RD 01/31/2023 5:26 PM

## 2023-01-31 NOTE — SUBJECTIVE & OBJECTIVE
Interval History:     Review of Systems   Constitutional:  Negative for activity change and appetite change.   HENT:  Negative for congestion and dental problem.    Eyes:  Negative for discharge and itching.   Respiratory:  Positive for shortness of breath.    Cardiovascular:  Negative for chest pain.   Gastrointestinal:  Negative for abdominal distention and abdominal pain.   Endocrine: Negative for cold intolerance.   Genitourinary:  Negative for difficulty urinating and dysuria.   Musculoskeletal:  Negative for arthralgias and back pain.   Skin:  Negative for color change.   Neurological:  Negative for dizziness and facial asymmetry.   Hematological:  Negative for adenopathy.   Psychiatric/Behavioral:  Negative for agitation and behavioral problems.    Objective:     Vital Signs (Most Recent):  Temp: 98.8 °F (37.1 °C) (01/31/23 0600)  Pulse: 67 (01/31/23 1015)  Resp: (!) 28 (01/31/23 1015)  BP: (!) 115/53 (01/31/23 0600)  SpO2: 96 % (01/31/23 1015)   Vital Signs (24h Range):  Temp:  [97 °F (36.1 °C)-98.8 °F (37.1 °C)] 98.8 °F (37.1 °C)  Pulse:  [63-83] 67  Resp:  [20-39] 28  SpO2:  [93 %-100 %] 96 %  BP: (110-128)/(53-59) 115/53     Weight: 81.4 kg (179 lb 7.3 oz)  Body mass index is 31.79 kg/m².    Intake/Output Summary (Last 24 hours) at 1/31/2023 1358  Last data filed at 1/31/2023 0601  Gross per 24 hour   Intake 600 ml   Output 1450 ml   Net -850 ml      Physical Exam  Vitals and nursing note reviewed.   Constitutional:       General: She is not in acute distress.  HENT:      Head: Atraumatic.      Right Ear: External ear normal.      Left Ear: External ear normal.      Nose: Nose normal.      Mouth/Throat:      Mouth: Mucous membranes are moist.   Cardiovascular:      Rate and Rhythm: Normal rate.   Pulmonary:      Effort: Pulmonary effort is normal.   Musculoskeletal:         General: Normal range of motion.      Cervical back: Normal range of motion.      Right lower leg: Edema present.      Left lower  leg: Edema present.   Skin:     General: Skin is warm.      Comments: Redness on legs    Neurological:      Mental Status: She is alert and oriented to person, place, and time.   Psychiatric:         Behavior: Behavior normal.       Significant Labs: All pertinent labs within the past 24 hours have been reviewed.  CBC:   Recent Labs   Lab 01/30/23  0537 01/31/23  0555   WBC 11.78 8.62   HGB 9.1* 9.3*   HCT 30.3* 30.8*    183     CMP:   Recent Labs   Lab 01/30/23 0537 01/31/23  0555    143   K 4.1 4.0    104   CO2 27 30*   GLU 74 79   BUN 46* 43*   CREATININE 3.8* 3.3*   CALCIUM 8.0* 8.3*   PROT 5.6* 5.6*   ALBUMIN 2.7* 2.8*   BILITOT 0.9 0.9   ALKPHOS 50* 49*   AST 18 14   ALT 12 11   ANIONGAP 6* 9       Significant Imaging: I have reviewed all pertinent imaging results/findings within the past 24 hours.

## 2023-01-31 NOTE — PROGRESS NOTES
[facility]  Progress Note    Patient Name: Marisol Kerr  MRN: [id number]  Patient Class: IP- Inpatient   Admission Date: 1/27/2023  Length of Stay: 4 days  Attending Physician: Dean Vanessa MD  Primary Care Provider: Khadar Dwyer MD        Subjective:     Principal Problem:Acute respiratory failure        HPI:  77 year old patient getting admitted with acute on chronic respiratory failure due to R sided Pneumonia and possible acute CHF flare  Pt was in this hospital very recently and went home with Lasix as per instructions from Nephrology MD   She started having Cough and shortness of breath 2 days ago  Today symptoms went worse and she came to ER , was put on BIPAP and got admitted  She was also found to be on ISSA with CKD      Overview/Hospital Course:  1/28  Got pRBC  FOBT positive  EGD in AM  Continously having chest pain which is normal for her  Received Xanax for anxiety episodes       1/29  Overall better  Had EGD today   Family thinks pt need rehab  Chronic chest pain is a normal thing for this pt  Also anxiety episodes    1/30 condition remains same  Rehab process started  D/C ed iv teflaro  Pt in bed all the time  Chest pain and anxiety episodes is a recurrent issue     1/31  Still on BIPAP  Family wishes to pursue LTAC  Condition improving       Interval History:     Review of Systems   Constitutional:  Negative for activity change and appetite change.   HENT:  Negative for congestion and dental problem.    Eyes:  Negative for discharge and itching.   Respiratory:  Positive for shortness of breath.    Cardiovascular:  Negative for chest pain.   Gastrointestinal:  Negative for abdominal distention and abdominal pain.   Endocrine: Negative for cold intolerance.   Genitourinary:  Negative for difficulty urinating and dysuria.   Musculoskeletal:  Negative for arthralgias and back pain.   Skin:  Negative for color change.   Neurological:  Negative for dizziness and facial  asymmetry.   Hematological:  Negative for adenopathy.   Psychiatric/Behavioral:  Negative for agitation and behavioral problems.    Objective:     Vital Signs (Most Recent):  Temp: 98.8 °F (37.1 °C) (01/31/23 0600)  Pulse: 67 (01/31/23 1015)  Resp: (!) 28 (01/31/23 1015)  BP: (!) 115/53 (01/31/23 0600)  SpO2: 96 % (01/31/23 1015)   Vital Signs (24h Range):  Temp:  [97 °F (36.1 °C)-98.8 °F (37.1 °C)] 98.8 °F (37.1 °C)  Pulse:  [63-83] 67  Resp:  [20-39] 28  SpO2:  [93 %-100 %] 96 %  BP: (110-128)/(53-59) 115/53     Weight: 81.4 kg (179 lb 7.3 oz)  Body mass index is 31.79 kg/m².    Intake/Output Summary (Last 24 hours) at 1/31/2023 1358  Last data filed at 1/31/2023 0601  Gross per 24 hour   Intake 600 ml   Output 1450 ml   Net -850 ml      Physical Exam  Vitals and nursing note reviewed.   Constitutional:       General: She is not in acute distress.  HENT:      Head: Atraumatic.      Right Ear: External ear normal.      Left Ear: External ear normal.      Nose: Nose normal.      Mouth/Throat:      Mouth: Mucous membranes are moist.   Cardiovascular:      Rate and Rhythm: Normal rate.   Pulmonary:      Effort: Pulmonary effort is normal.   Musculoskeletal:         General: Normal range of motion.      Cervical back: Normal range of motion.      Right lower leg: Edema present.      Left lower leg: Edema present.   Skin:     General: Skin is warm.      Comments: Redness on legs    Neurological:      Mental Status: She is alert and oriented to person, place, and time.   Psychiatric:         Behavior: Behavior normal.       Significant Labs: All pertinent labs within the past 24 hours have been reviewed.  CBC:   Recent Labs   Lab 01/30/23  0537 01/31/23  0555   WBC 11.78 8.62   HGB 9.1* 9.3*   HCT 30.3* 30.8*    183     CMP:   Recent Labs   Lab 01/30/23  0537 01/31/23  0555    143   K 4.1 4.0    104   CO2 27 30*   GLU 74 79   BUN 46* 43*   CREATININE 3.8* 3.3*   CALCIUM 8.0* 8.3*   PROT 5.6* 5.6*    ALBUMIN 2.7* 2.8*   BILITOT 0.9 0.9   ALKPHOS 50* 49*   AST 18 14   ALT 12 11   ANIONGAP 6* 9       Significant Imaging: I have reviewed all pertinent imaging results/findings within the past 24 hours.    Assessment/Plan:      * Acute respiratory failure  Acute on chronic respiratory failure due to Pneumonia/CHF flare   Maintain iv abx/Diuretics     Acute pneumonia  Maintain iv abx for suspected  Aspiration pneumonia   Was on iv teflaro too for possible MRSA pneumonia in view with recent hospitalization     Coronary artery disease, multivessel with history of previous PCI  Significant coronary disease with blockages in the right coronary and circumflex mid LAD (per recent angiogram;coronary)  Recently had subendocardial ischemia and associated chest pain on 1/17  Medical management at the moment   Continue Brilinta  Pt regularly uses NG spray which is normal for her       ISSA (acute kidney injury)  ISSA on CKD  Maintain iv lasix  Mostly has Cardiorenal syndrome  Poor prognosis        Debility  Need rehab vs LTAC upon discharge from hospital    Shortness of breath  As above  Chronic issue       Anemia  S/p pRBC on 1/28/2023  FOBT positive  EGD on 1/29 showed Non-bleeding duodenal diverticulum and Chronic erosive gastritis with hemorrhage  Maintain PPI      Congestive heart failure with left ventricular diastolic dysfunction, acute on chronic  Maintain Iv lasix       Chronic diastolic congestive heart failure  Aware     Chronic hypoxemic respiratory failure  On home oxygen     Class 2 obesity in adult  Body mass index is 32.49 kg/m². Morbid obesity complicates all aspects of disease management from diagnostic modalities to treatment.          CKD (chronic kidney disease), stage III  Presently has ISSA on CKD 3      Essential hypertension, benign  Continue present regime        VTE Risk Mitigation (From admission, onward)           Ordered     IP VTE HIGH RISK PATIENT  Once         01/27/23 1333     Place sequential  compression device  Until discontinued         01/27/23 1333                    Discharge Planning   LUZ:      Code Status: Full Code   Is the patient medically ready for discharge?:     Reason for patient still in hospital (select all that apply): Pending disposition  Discharge Plan A: Home Health, Home                  Dean Vanessa MD  Department of Hospital Medicine   [facility]

## 2023-01-31 NOTE — NURSING
77 yr old female  awake alert  [place] has bipap on   Sacral wound .5 round 3.5 cm deep tunneling toward the 6oclock position when probing with q tip it feels to be bone exposed  scant amt of yellow drainage     Bilat lower legs dry flaky red  normal temp     Bilat heels right heel .7cm discolored area on admit         Recommendation:    Bilat heels Clean area with chlorhexidine/ns  Pat dry  Apply venelex and cover with mepilex.     Bilat legs  Clean with chlorhexidine/ns. Pat dry. Apply Hydrophor daily.  Not between the toes.    Sacral Clean area with chlorhexidine/ns  Pat dry cover with mepilex.    Message sent to MD

## 2023-01-31 NOTE — PT/OT/SLP PROGRESS
Physical Therapy Treatment    Patient Name:  Marisol Kerr   MRN:  [id number]    Recommendations:     Discharge Recommendations: nursing facility, skilled  Discharge Equipment Recommendations: none  Barriers to discharge: None    Assessment:     Marisol Kerr is a 77 y.o. female admitted with a medical diagnosis of Acute respiratory failure.  She presents with the following impairments/functional limitations: weakness, impaired endurance, impaired self care skills, impaired functional mobility, gait instability, impaired balance, impaired cardiopulmonary response to activity.  Required encouragement to participate due to fatigue and SOB. Nurse requested bipap remain in place for session.  Pt transferred with modAx1 and minAx1 to/from EOB. Remained EOB x 8 minutes with encouragement, and with VC & demo to decrease resp rate. Attempted knee extension exercises to redirect pt from shortness of breath but discontinued with increased complaints.  Will progress mobility and activity as tolerated.     Rehab Prognosis: Fair; patient would benefit from acute skilled PT services to address these deficits and reach maximum level of function.    Recent Surgery: Procedure(s) (LRB):  EGD (ESOPHAGOGASTRODUODENOSCOPY) (N/A) 2 Days Post-Op    Plan:     During this hospitalization, patient to be seen 6 x/week to address the identified rehab impairments via gait training, therapeutic activities, therapeutic exercises and progress toward the following goals:    Plan of Care Expires:  02/28/23    Subjective     Chief Complaint: SOB  Patient/Family Comments/goals: none verbalized  Pain/Comfort:  Pain Rating 1: 0/10  Pain Rating Post-Intervention 1: 0/10      Objective:     Communicated with nurse prior to session.  Patient found right sidelying with telemetry, BIPAP, bed alarm, pulse ox (continuous), peripheral IV, PureWick upon PT entry to room.     General Precautions: Standard, fall  Orthopedic Precautions: N/A  Braces: N/A  Respiratory  Status:  Bipap     Functional Mobility:  Bed Mobility:     Supine to Sit: minimum assistance and moderate assistance  Sit to Supine: minimum assistance and moderate assistance      AM-PAC 6 CLICK MOBILITY          Treatment & Education:  -Pt educ in importance of participation with PT to prevent functional decline  -Pt educ in pressure relief for skin integrity; agreed to roll to L side at end of session    Patient left left sidelying with all lines intact, call button in reach, bed alarm on, and nurse notified..    GOALS:   Multidisciplinary Problems       Physical Therapy Goals          Problem: Physical Therapy    Goal Priority Disciplines Outcome Goal Variances Interventions   Physical Therapy Goal     PT, PT/OT      Description: Goals to be met by: 2023    Patient will increase functional independence with mobility by performin. Supine to sit with MInimal Assistance  2. Sit to stand transfer with Minimal Assistance  3. Gait  x 50  feet with Contact Guard Assistance using Rolling Walker.                          Time Tracking:     PT Received On: 23  PT Start Time: 1406     PT Stop Time: 1424  PT Total Time (min): 18 min     Billable Minutes: Therapeutic Exercise 18    Treatment Type: Treatment  PT/PTA: PTA     PTA Visit Number: 1     2023

## 2023-01-31 NOTE — PLAN OF CARE
01/31/23 0930   Patient Assessment/Suction   Level of Consciousness (AVPU) alert   Respiratory Effort Normal;Unlabored   All Lung Fields Breath Sounds diminished   Skin Integrity   $ Wound Care Tech Time 15 min   Area Observed Bridge of nose   Skin Appearance without discoloration   Barrier used? Gel Cushion   PRE-TX-O2   Device (Oxygen Therapy) BIPAP   $ Is the patient on Low Flow Oxygen? Yes   Oxygen Concentration (%) 30   SpO2 100 %   Pulse Oximetry Type Continuous   $ Pulse Oximetry - Multiple Charge Pulse Oximetry - Multiple   Pulse 83   Resp (!) 23   Aerosol Therapy   $ Aerosol Therapy Charges PRN treatment not required   Respiratory Treatment Status (SVN) PRN treatment not required   Respiratory Interventions   NPPV/CPAP Maintenance mask adjusted   Preset CPAP/BiPAP Settings   Mode Of Delivery BiPAP S/T   $ CPAP/BiPAP Daily Charge BiPAP/CPAP Daily   $ Initial CPAP/BiPAP Setup? No   $ Is patient using? Yes   Size of Mask Small/Medium   Sized Appropriately? Yes   Equipment Type V60   Airway Device Type medium full face mask   Humidifier not applicable   Ipap 18   EPAP (cm H2O) 8   Pressure Support (cm H2O) 10   Set Rate (Breaths/Min) 12   ITime (sec) 1   Rise Time (sec) 3   Patient CPAP/BiPAP Settings   Timed Inspiration (Sec) 1   IPAP Rise Time (sec) 3   RR Total (Breaths/Min) 26   Tidal Volume (mL) 680   VE Minute Ventilation (L/min) 17.6 L/min   Peak Inspiratory Pressure (cm H2O) 17   TiTOT (%) 23   Total Leak (L/Min) 12   Patient Trigger - ST Mode Only (%) 81   CPAP/BiPAP Alarms   High Pressure (cm H2O) 40   Low Pressure (cm H2O) 5   Minute Ventilation (L/Min) 5   High RR (breaths/min) 40   Low RR (breaths/min) 10   Apnea (Sec) 20   Education   $ Education BiPAP;15 min;Bronchodilator   Respiratory Evaluation   $ Care Plan Tech Time 15 min   $ Eval/Re-eval Charges Re-evaluation

## 2023-01-31 NOTE — PT/OT/SLP EVAL
Occupational Therapy   Evaluation    Name: Marisol Kerr  MRN: [id number]  Admitting Diagnosis: Acute respiratory failure  Recent Surgery: Procedure(s) (LRB):  EGD (ESOPHAGOGASTRODUODENOSCOPY) (N/A) 2 Days Post-Op    Recommendations:     Discharge Recommendations: nursing facility, skilled  Discharge Equipment Recommendations:  none  Barriers to discharge:  Decreased caregiver support    Assessment:     Marisol Kerr is a 77 y.o. female with a medical diagnosis of Acute respiratory failure.  She presents with SOB with all acivity. Performance deficits affecting function: weakness, impaired endurance, impaired self care skills, impaired functional mobility, gait instability, impaired balance, impaired cardiopulmonary response to activity.      Rehab Prognosis: Fair; patient would benefit from acute skilled OT services to address these deficits and reach maximum level of function.       Plan:     Patient to be seen 5 x/week to address the above listed problems via self-care/home management, therapeutic activities, therapeutic exercises  Plan of Care Expires: 02/28/23  Plan of Care Reviewed with: patient    Subjective     Chief Complaint: SOB with activity.  Patient/Family Comments/goals: reduced SOB, improved functional mobility and ADL independence.    Occupational Profile:  Living Environment: lives alone in a 1 story home, threshold to enter.  Previous level of function: modified independent for ADLs, IADLs using a rolling walker. Does not drive. Relies on daughter for transportation and to run errands. Daughter checks on patient daily.  Roles and Routines: limited homemaker  Equipment Used at Home: cane, straight, walker, rolling, oxygen (toilet grab bars)  Assistance upon Discharge: Daughter    Pain/Comfort:  Pain Rating 1: 0/10  Pain Rating Post-Intervention 1: 0/10    Patients cultural, spiritual, Latter-day conflicts given the current situation: no    Objective:     Communicated with: nurse prior to session.   Patient found HOB elevated with telemetry, pulse ox (continuous), peripheral IV, PureWick, BIPAP upon OT entry to room.    General Precautions: Standard, fall  Orthopedic Precautions: N/A  Braces: N/A  Respiratory Status: High flow, flow 3 L/min at beginning of session. RN came in room and put patient on Bipap during session.    Occupational Performance:    Bed Mobility:    Declined EOB activity, but participated in long sitting bed level x2 trials for 5 seconds with moderate assistance.    Cognitive/Visual Perceptual:  Cognitive/Psychosocial Skills:     -       Oriented to: Person, Place, and Situation   -       Follows Commands/attention:Follows two-step commands  -       Communication: clear/fluent  -       Memory: No Deficits noted  -       Safety awareness/insight to disability: impaired   -       Mood/Affect/Coping skills/emotional control: Anxious and Guarded  Visual/Perceptual:      -Intact Acuity    AMPAC 6 Click ADL:  AMPAC Total Score: 15    Treatment & Education:  Patient anxious about moving due to feeling SOB.    Patient left HOB elevated with all lines intact, call button in reach, bed alarm on, and daughter present    GOALS:   Multidisciplinary Problems       Occupational Therapy Goals          Problem: Occupational Therapy    Goal Priority Disciplines Outcome Interventions   Occupational Therapy Goal     OT, PT/OT     Description: Goals to be met by: 2/28/2023     Patient will increase functional independence with ADLs by performing:    UE Dressing with Supervision.  LE Dressing with Supervision.  Grooming while standing at sink with Supervision.  Toileting from toilet with Supervision for hygiene and clothing management.   Toilet transfer to toilet with Supervision.  Perform 30 minutes sitting/standing ADL activity with O2 sats 90% or above.                         History:     Past Medical History:   Diagnosis Date    CAD (coronary artery disease)     Cancer     colon    CHF (congestive heart  failure)     Colitis     Colon cancer     COPD (chronic obstructive pulmonary disease)     Decreased hearing     Diabetes mellitus     Diabetes mellitus, type 2     Hypercholesterolemia     Hypertension     Hypoxemia     Insomnia     Obesity     Osteoarthritis          Past Surgical History:   Procedure Laterality Date    ANGIOGRAM, CORONARY, WITH LEFT HEART CATHETERIZATION N/A 2/10/2022    Procedure: Angiogram, Coronary, with Left Heart Cath;  Surgeon: Cristian Benjamin MD;  Location: [facility] CATH/EP LAB;  Service: Cardiology;  Laterality: N/A;    CARDIAC CATHETERIZATION      CHOLECYSTECTOMY      COLECTOMY      CORONARY ANGIOPLASTY      CORONARY STENT PLACEMENT      ERCP N/A 1/16/2023    Procedure: ERCP (ENDOSCOPIC RETROGRADE CHOLANGIOPANCREATOGRAPHY);  Surgeon: Biju Kramer III, MD;  Location: [facility] ENDO;  Service: Endoscopy;  Laterality: N/A;    ESOPHAGOGASTRODUODENOSCOPY N/A 1/29/2023    Procedure: EGD (ESOPHAGOGASTRODUODENOSCOPY);  Surgeon: Aarti Alvarez MD;  Location: [facility];  Service: Endoscopy;  Laterality: N/A;    EXTERNAL EAR SURGERY      EYE SURGERY      FLEXIBLE SIGMOIDOSCOPY N/A 9/19/2019    Procedure: SIGMOIDOSCOPY, FLEXIBLE;  Surgeon: Biju Kramer III, MD;  Location: [facility];  Service: Endoscopy;  Laterality: N/A;    HYSTERECTOMY      partial       Time Tracking:     OT Date of Treatment: 01/31/23  OT Start Time: 1104  OT Stop Time: 1119  OT Total Time (min): 15 min    Billable Minutes:Evaluation 3  Therapeutic Activity 12    1/31/2023

## 2023-01-31 NOTE — PROGRESS NOTES
"Pulmonary/Critical Care  Progress Note      Patient name: Marisol Kerr  MRN: [id number]  Date: 01/31/2023    Admit Date: 1/27/2023  Consult Requested By: Dean Vanessa MD    Reason for Consult: COPD, acute respiratory failure    HPI:    1/28/2023 - Pt with h/o COPD (very severe FEV1 - 27%), chronic respiratory failure had recent admission and was DC home with lasix and came to ER with increased SOB, in ER was placed on BiPAP and admitted for further therapy.  She admits to increased SOB, + cough with some mucus, some chest pain which  she relates to cough.  She reports that her O2 at home is usually 4-5 but that her sats were decreased.    1/29/2023 - About the same and on BiPAP when I saw her, no new complaints.  BNP better, procalcitonin remains low, creatinine better.  CXR looks better this AM.    1/30 the patient is off of her BiPAP.  She is on nasal cannula.  She can not communicate as she does not have her hearing aids in.  The nurse reports increasing confusion.  I spoke with her daughter who expects her to live for several more years alone in her home if she just has some rehab.    1/31 The patient is requiring nearly continuous Bipap.  Discussed LTAC with the patient's daughter who would like for her to go to [facility] in [place].      Review of Systems  Unobtainable    No change in the patient's Past Medical History, Past Surgical History, Social History or Family History since admission.      Physical Exam    Vital signs:  Temp:  [97 °F (36.1 °C)-98.8 °F (37.1 °C)]   Pulse:  [63-83]   Resp:  [20-39]   BP: (110-128)/(53-59)   SpO2:  [93 %-100 %]     Intake/Output:   Intake/Output Summary (Last 24 hours) at 1/31/2023 1422  Last data filed at 1/31/2023 0601  Gross per 24 hour   Intake 600 ml   Output 1450 ml   Net -850 ml        BMI: Estimated body mass index is 31.79 kg/m² as calculated from the following:    Height as of this encounter: 5' 3" (1.6 m).    Weight as of this encounter: 81.4 kg (179 lb 7.3 " oz).    PHYSICAL EXAM  GENERAL: Older patient in no distress.  Unable to communicate with her is she does not have her hearing aids  HEENT: Pupils equal and reactive. Extraocular movements intact. Nose intact.  Pharynx moist.  NECK: Supple.   HEART: Regular rate and rhythm. No murmur or gallop auscultated.  LUNGS: Clear to auscultation and percussion. Lung excursion symmetrical. No change in fremitus. No adventitial noises.  ABDOMEN: Bowel sounds present. Non-tender, no masses palpated.  Obese.  : Normal anatomy.  EXTREMITIES: Normal muscle tone and joint movement, no cyanosis or clubbing.   LYMPHATICS: No adenopathy palpated, legs are very wrinkled from diuresis.    SKIN: Dry, intact, no lesions. There is pretibial erythema from chronic venous stasis.  NEURO: Cranial nerves II-XII intact. Motor strength 5/5 bilaterally, upper and lower extremities.  PSYCH: Appropriate affect.      Laboratory    Recent Labs   Lab 01/31/23  0555   WBC 8.62   RBC 3.14*   HGB 9.3*   HCT 30.8*      MCV 98   MCH 29.6   MCHC 30.2*       Recent Labs   Lab 01/31/23  0555   CALCIUM 8.3*   PROT 5.6*      K 4.0   CO2 30*      BUN 43*   CREATININE 3.3*   ALKPHOS 49*   ALT 11   AST 14   BILITOT 0.9           Microbiology:       Microbiology Results (last 7 days)       Procedure Component Value Units Date/Time    Blood culture #1 **CANNOT BE ORDERED STAT** [[id number]] Collected: 01/27/23 0720    Order Status: Completed Specimen: Blood from Peripheral, Forearm, Left Updated: 01/31/23 1032     Blood Culture, Routine No Growth to date      No Growth to date      No Growth to date      No Growth to date      No Growth to date    Blood culture #2 **CANNOT BE ORDERED STAT** [[id number]] Collected: 01/27/23 0745    Order Status: Completed Specimen: Blood from Peripheral, Antecubital, Right Updated: 01/31/23 1032     Blood Culture, Routine No Growth to date      No Growth to date      No Growth to date      No Growth to date      No  Growth to date            Radiology    X-Ray Chest AP Portable  Chest single view    CLINICAL DATA: Shortness of breath, follow-up    FINDINGS: AP view is compared to January 27. The heart and mediastinum are unremarkable. Previously noted right basilar infiltrate appears improved. Minor residual atelectasis or infiltrate is noted. Right pleural effusion has improved, with decreasing blunting of the right costophrenic angle. There is a trace amount of pleural fluid on the left.    IMPRESSION:  1. Interval improvement, with decreasing right basilar infiltrate and small pleural effusion.  2. No other significant changes.    Electronically signed by:  Zohaib Lucio MD  1/29/2023 6:37 AM CST Workstation: [id number]          Oxygen Information    Oxygen Concentration (%):  [0.3-30] 30         Recent Labs     01/30/23  1133   PH 7.317*   PCO2 57.6*   PO2 94   HCO3 29.5*   POCSATURATED 96   BE 3         Impression    Active Hospital Problems    Diagnosis  POA    *Acute respiratory failure [J96.00]  Unknown    Congestive heart failure [I50.9]  Yes    Debility [R53.81]  Yes    Anemia [D64.9]  Unknown    Shortness of breath [R06.02]  Unknown    Acute pneumonia [J18.9]  Unknown    Congestive heart failure with left ventricular diastolic dysfunction, acute on chronic [I50.33]  Yes    ISSA (acute kidney injury) [N17.9]  Unknown    Chronic diastolic congestive heart failure [I50.32]  Yes    Chronic hypoxemic respiratory failure [J96.11]  Yes     3 L at home. Is on 3L during first day of admission.       Class 2 obesity in adult [E66.9]  Yes    CKD (chronic kidney disease), stage III [N18.30]  Yes    Coronary artery disease, multivessel with history of previous PCI [I25.10]  Yes    Essential hypertension, benign [I10]  Yes      Resolved Hospital Problems   No resolved problems to display.   Acute on chronic hypercapnic hypoxemic respiratory failure  Persisting partially compensated respiratory acidosis  Bilateral pleural  effusions  End-stage COPD  Obesity hypoventilation syndrome  Diastolic heart failure  Chronic venous stasis changes to the lower legs  Anemia of chronic disease  Acute on chronic renal failure  Moderate hypoalbuminemia/ obesity  Anxiety    Plan    BiPAP for 16-18 hours a day  Continue scheduled bronchodilators with Prn respiratory treatments   Finish Joel Cesar as able  Renal following for ISSA   Increase activity as able  Patient will either need an LTAC on discharge  Discussed with daughter, nurse, and hospitalist

## 2023-01-31 NOTE — PROGRESS NOTES
[facility]  Progress Note    Patient Name: Marisol Kerr  MRN: [id number]  Patient Class: IP- Inpatient   Admission Date: 1/27/2023  Length of Stay: 3 days  Attending Physician: Dean Vanessa MD  Primary Care Provider: Khadar Dwyer MD        Subjective:     Principal Problem:Acute respiratory failure        HPI:  77 year old patient getting admitted with acute on chronic respiratory failure due to R sided Pneumonia and possible acute CHF flare  Pt was in this hospital very recently and went home with Lasix as per instructions from Nephrology MD   She started having Cough and shortness of breath 2 days ago  Today symptoms went worse and she came to ER , was put on BIPAP and got admitted  She was also found to be on ISSA with CKD        Overview/Hospital Course:  1/28  Got pRBC  FOBT positive  EGD in AM  Continously having chest pain which is normal for her  Received Xanax for anxiety episodes       1/29  Overall better  Had EGD today   Family thinks pt need rehab  Chronic chest pain is a normal thing for this pt  Also anxiety episodes    1/30 condition remains same  Rehab process started  D/C ed iv teflaro  Pt in bed all the time  Chest pain and anxiety episodes is a recurrent issue       Interval History:     Review of Systems   Constitutional:  Positive for fatigue. Negative for activity change and appetite change.   HENT:  Negative for congestion and dental problem.    Eyes:  Negative for discharge and itching.   Respiratory:  Positive for shortness of breath.    Cardiovascular:  Negative for chest pain.   Gastrointestinal:  Negative for abdominal distention and abdominal pain.   Endocrine: Negative for cold intolerance.   Genitourinary:  Negative for difficulty urinating and dysuria.   Musculoskeletal:  Negative for arthralgias and back pain.   Skin:  Negative for color change.   Neurological:  Negative for dizziness and facial asymmetry.   Hematological:  Negative for adenopathy.    Psychiatric/Behavioral:  Negative for agitation and behavioral problems.    Objective:     Vital Signs (Most Recent):  Temp: 97.8 °F (36.6 °C) (01/30/23 1525)  Pulse: 72 (01/30/23 1540)  Resp: (!) 28 (01/30/23 1540)  BP: (!) 121/56 (01/30/23 1525)  SpO2: 98 % (01/30/23 1540)   Vital Signs (24h Range):  Temp:  [97.5 °F (36.4 °C)-98.2 °F (36.8 °C)] 97.8 °F (36.6 °C)  Pulse:  [64-91] 72  Resp:  [16-35] 28  SpO2:  [95 %-100 %] 98 %  BP: (117-176)/(53-79) 121/56     Weight: 81.4 kg (179 lb 7.3 oz)  Body mass index is 31.79 kg/m².    Intake/Output Summary (Last 24 hours) at 1/30/2023 1844  Last data filed at 1/30/2023 1700  Gross per 24 hour   Intake 480 ml   Output 3100 ml   Net -2620 ml      Physical Exam  Vitals and nursing note reviewed.   Constitutional:       General: She is not in acute distress.  HENT:      Head: Atraumatic.      Right Ear: External ear normal.      Left Ear: External ear normal.      Nose: Nose normal.      Mouth/Throat:      Mouth: Mucous membranes are moist.   Cardiovascular:      Rate and Rhythm: Normal rate.   Pulmonary:      Effort: Pulmonary effort is normal.   Abdominal:      Palpations: Abdomen is soft.   Musculoskeletal:         General: Normal range of motion.      Cervical back: Normal range of motion.   Skin:     General: Skin is warm.   Neurological:      Mental Status: She is alert and oriented to person, place, and time.   Psychiatric:         Behavior: Behavior normal.       Significant Labs: All pertinent labs within the past 24 hours have been reviewed.  CBC:   Recent Labs   Lab 01/28/23  1921 01/29/23  0319 01/30/23  0537   WBC 12.19 12.07  12.07 11.78   HGB 7.8* 8.1*  8.1* 9.1*   HCT 25.9* 26.8*  26.8* 30.3*    211  211 185     CMP:   Recent Labs   Lab 01/29/23  0315 01/30/23  0537     139 138   K 4.1  4.1 4.1     107 105   CO2 25  25 27   GLU 76  76 74   BUN 53*  53* 46*   CREATININE 4.7*  4.7* 3.8*   CALCIUM 8.2*  8.2* 8.0*   PROT 5.5* 5.6*    ALBUMIN 2.8*  2.8* 2.7*   BILITOT 1.0 0.9   ALKPHOS 49* 50*   AST 20 18   ALT 11 12   ANIONGAP 7*  7* 6*       Significant Imaging: I have reviewed all pertinent imaging results/findings within the past 24 hours.      Assessment/Plan:      * Acute respiratory failure  Acute on chronic respiratory failure due to Pneumonia/CHF flare   Maintain iv abx/Diuretics     Acute pneumonia  Maintain iv abx for suspected  Aspiration pneumonia   Was on iv teflaro too for possible MRSA pneumonia in view with recent hospitalization     Coronary artery disease, multivessel with history of previous PCI  Significant coronary disease with blockages in the right coronary and circumflex mid LAD (per recent angiogram;coronary)  Recently had subendocardial ischemia and associated chest pain on 1/17  Medical management at the moment   Continue Brilinta  Pt regularly uses NG spray which is normal for her       ISSA (acute kidney injury)  ISSA on CKD  Maintain iv lasix  Mostly has Cardiorenal syndrome  Poor prognosis        Debility  Need rehab vs LTAC upon discharge from hospital    Shortness of breath  As above  Chronic issue       Anemia  S/p pRBC on 1/28/2023  FOBT positive  EGD on 1/29 showed Non-bleeding duodenal diverticulum and Chronic erosive gastritis with hemorrhage  Maintain PPI      Congestive heart failure with left ventricular diastolic dysfunction, acute on chronic  Maintain Iv lasix       Chronic diastolic congestive heart failure  Aware     Chronic hypoxemic respiratory failure  On home oxygen     Class 2 obesity in adult  Body mass index is 32.49 kg/m². Morbid obesity complicates all aspects of disease management from diagnostic modalities to treatment.          CKD (chronic kidney disease), stage III  Presently has ISSA on CKD 3      Essential hypertension, benign  Continue present regime      VTE Risk Mitigation (From admission, onward)         Ordered     IP VTE HIGH RISK PATIENT  Once         01/27/23 [facility]     Place  sequential compression device  Until discontinued         01/27/23 1333                Discharge Planning   LUZ:      Code Status: Full Code   Is the patient medically ready for discharge?:     Reason for patient still in hospital (select all that apply): Treatment and Pending disposition  Discharge Plan A: Home Health, Home                  Dean Vanessa MD  Department of Hospital Medicine   [facility]

## 2023-01-31 NOTE — SUBJECTIVE & OBJECTIVE
Interval History:     Review of Systems   Constitutional:  Positive for fatigue. Negative for activity change and appetite change.   HENT:  Negative for congestion and dental problem.    Eyes:  Negative for discharge and itching.   Respiratory:  Positive for shortness of breath.    Cardiovascular:  Negative for chest pain.   Gastrointestinal:  Negative for abdominal distention and abdominal pain.   Endocrine: Negative for cold intolerance.   Genitourinary:  Negative for difficulty urinating and dysuria.   Musculoskeletal:  Negative for arthralgias and back pain.   Skin:  Negative for color change.   Neurological:  Negative for dizziness and facial asymmetry.   Hematological:  Negative for adenopathy.   Psychiatric/Behavioral:  Negative for agitation and behavioral problems.    Objective:     Vital Signs (Most Recent):  Temp: 97.8 °F (36.6 °C) (01/30/23 1525)  Pulse: 72 (01/30/23 1540)  Resp: (!) 28 (01/30/23 1540)  BP: (!) 121/56 (01/30/23 1525)  SpO2: 98 % (01/30/23 1540)   Vital Signs (24h Range):  Temp:  [97.5 °F (36.4 °C)-98.2 °F (36.8 °C)] 97.8 °F (36.6 °C)  Pulse:  [64-91] 72  Resp:  [16-35] 28  SpO2:  [95 %-100 %] 98 %  BP: (117-176)/(53-79) 121/56     Weight: 81.4 kg (179 lb 7.3 oz)  Body mass index is 31.79 kg/m².    Intake/Output Summary (Last 24 hours) at 1/30/2023 1844  Last data filed at 1/30/2023 1700  Gross per 24 hour   Intake 480 ml   Output 3100 ml   Net -2620 ml      Physical Exam  Vitals and nursing note reviewed.   Constitutional:       General: She is not in acute distress.  HENT:      Head: Atraumatic.      Right Ear: External ear normal.      Left Ear: External ear normal.      Nose: Nose normal.      Mouth/Throat:      Mouth: Mucous membranes are moist.   Cardiovascular:      Rate and Rhythm: Normal rate.   Pulmonary:      Effort: Pulmonary effort is normal.   Abdominal:      Palpations: Abdomen is soft.   Musculoskeletal:         General: Normal range of motion.      Cervical back: Normal  range of motion.   Skin:     General: Skin is warm.   Neurological:      Mental Status: She is alert and oriented to person, place, and time.   Psychiatric:         Behavior: Behavior normal.       Significant Labs: All pertinent labs within the past 24 hours have been reviewed.  CBC:   Recent Labs   Lab 01/28/23 1921 01/29/23 0319 01/30/23  0537   WBC 12.19 12.07  12.07 11.78   HGB 7.8* 8.1*  8.1* 9.1*   HCT 25.9* 26.8*  26.8* 30.3*    211  211 185     CMP:   Recent Labs   Lab 01/29/23 0315 01/30/23  0537     139 138   K 4.1  4.1 4.1     107 105   CO2 25  25 27   GLU 76  76 74   BUN 53*  53* 46*   CREATININE 4.7*  4.7* 3.8*   CALCIUM 8.2*  8.2* 8.0*   PROT 5.5* 5.6*   ALBUMIN 2.8*  2.8* 2.7*   BILITOT 1.0 0.9   ALKPHOS 49* 50*   AST 20 18   ALT 11 12   ANIONGAP 7*  7* 6*       Significant Imaging: I have reviewed all pertinent imaging results/findings within the past 24 hours.

## 2023-01-31 NOTE — PLAN OF CARE
CM spoke with patient at bedside on 1/30 and daughter Marisol via phone today. CM explained level of care for SNF and LTAC to Marisol. Daughter request CM send LTAC referral to [facility]. [facility] is first choice for LTAC. CM sent referral in [facility] and notified [facility] liaison.       01/31/23 7433   Discharge Reassessment   Assessment Type Discharge Planning Reassessment   Did the patient's condition or plan change since previous assessment? No   Discharge Plan discussed with: Adult children;Patient   Discharge Plan A Long-term acute care facility (LTAC)   Discharge Plan B Skilled Nursing Facility   DME Needed Upon Discharge  none   Why the patient remains in the hospital Requires continued medical care   Post-Acute Status   Post-Acute Authorization Placement   Post-Acute Placement Status Referrals Sent   Patient choice form signed by patient/caregiver List with quality metrics by geographic area provided

## 2023-01-31 NOTE — PROGRESS NOTES
INPATIENT NEPHROLOGY Progress Note  [facility] NEPHROLOGY    Marisol Kerr  01/31/2023    Reason for consultation:    Acute kidney injury    Chief Complaint:   Chief Complaint   Patient presents with    Shortness of Breath          History of Present Illness:    Per H and P    77-year-old female presents emergency room with complaints of having severe shortness of breath that began 30 minutes prior to arrival.  The patient was transported to the ED by EMS.  She was placed on CPAP EN route.  The patient is normally on home O2 at 5 L and noted to have had an oxygen saturation of 80% at home.  Patient apparently lives at home alone and she apparently called EMS.  Patient has a prior history of CHF, COPD, colon cancer, coronary artery disease.  There were no reports of her complaining of chest pain or having any nausea vomiting.  The patient is in such respiratory extremis little other history is obtainable.    1/27  seen in the ER.   She states she feels a lot better after the lasix.  No nausea, chest pain, sob, fever, urinary or bowel complaint, new neurologic symptoms, new joint pain  1/28  1650 cc uop overnight.  AFVSS.  No nausea, chest pain, sob, fever, urinary or bowel complaint, new neurologic symptoms, new joint pain  1/29  just had EGD.  Still very groggy.  Output not appropriately recorded.    1/30 VSS, on 4L NC, UOP 2.7L, feeling better, on/off BIPAP  1/31 VSS, on 3L NC/BIPAP, UOP 1.8L, daniel out    Plan of Care:     Acute kidney injury suspicious of hemodynamically mediated renal injury from heart failure with CRS  - renal function is improving with IV diuresis- will continue    Pneumonia/pulmonary edema (BNP 1106)  - renal dose abx for crcl < 20  - continue lasix 40mg IV daily     Anemia  - iron stores adequate  - transfused 1/29  - epogen 10k sq given 1/28  - paraproteins pending    Hypertension  - controlled on ISMN/hydral  - hold RAAS agents  - continue enal diet with 1.5L fluid restriction     Secondary  hyperparathyroidism  - parameters are at goal    Thank you for allowing us to participate in this patient's care. We will continue to follow.    Vital Signs:  Temp Readings from Last 3 Encounters:   01/31/23 98.8 °F (37.1 °C) (Axillary)   01/19/23 98 °F (36.7 °C) (Oral)   01/10/23 98.5 °F (36.9 °C) (Oral)       Pulse Readings from Last 3 Encounters:   01/31/23 74   01/24/23 62   01/19/23 70       BP Readings from Last 3 Encounters:   01/31/23 (!) 115/53   01/24/23 (!) 118/58   01/19/23 (!) 142/82       Weight:  Wt Readings from Last 3 Encounters:   01/30/23 81.4 kg (179 lb 7.3 oz)   01/24/23 83.1 kg (183 lb 3.2 oz)   01/16/23 84.3 kg (185 lb 13.6 oz)     Medications:  No current facility-administered medications on file prior to encounter.     Current Outpatient Medications on File Prior to Encounter   Medication Sig Dispense Refill    albuterol (VENTOLIN HFA) 90 mcg/actuation inhaler Inhale 2 puffs into the lungs every 6 (six) hours as needed for Wheezing or Shortness of Breath. Rescue 36 g 3    albuterol-ipratropium (DUO-NEB) 2.5 mg-0.5 mg/3 mL nebulizer solution Take 3 mLs by nebulization every 4 (four) hours as needed for Wheezing or Shortness of Breath. Rescue 120 each 6    allopurinoL (ZYLOPRIM) 100 MG tablet Take 0.5 tablets (50 mg total) by mouth once daily. 45 tablet 1    amLODIPine (NORVASC) 10 MG tablet Take 0.5 tablets (5 mg total) by mouth once daily. 15 tablet 11    aspirin (ECOTRIN) 81 MG EC tablet Take 1 tablet (81 mg total) by mouth every morning. 90 tablet 3    atorvastatin (LIPITOR) 40 MG tablet Take 1 tablet (40 mg total) by mouth once daily. 30 tablet 0    cholecalciferol, vitamin D3, 125 mcg (5,000 unit) Tab Take 5,000 Units by mouth once daily.      coenzyme Q10 100 mg capsule Take 100 mg by mouth every morning.      diltiaZEM (CARDIZEM CD) 120 MG Cp24 Take 1 capsule (120 mg total) by mouth once daily. 90 capsule 1    ergocalciferol (VITAMIN D2) 50,000 unit Cap Take 1 capsule (50,000 Units  total) by mouth every 7 days. 12 capsule 1    ferrous sulfate 325 (65 FE) MG EC tablet Take 325 mg by mouth once daily.      fish oil-omega-3 fatty acids 300-1,000 mg capsule Take 2 capsules by mouth every morning.      furosemide (LASIX) 40 MG tablet Take 1 tablet (40 mg total) by mouth once daily. 30 tablet 0    hydrALAZINE (APRESOLINE) 50 MG tablet Take 1 tablet (50 mg total) by mouth 3 (three) times daily. 90 tablet 0    ipratropium-albuteroL (COMBIVENT RESPIMAT)  mcg/actuation inhaler Inhale 2 puffs into the lungs every 4 (four) hours as needed for Shortness of Breath. Rescue 12 g 3    isosorbide mononitrate (IMDUR) 120 MG 24 hr tablet Take 1 tablet (120 mg total) by mouth once daily. 90 tablet 1    metoprolol tartrate (LOPRESSOR) 50 MG tablet Take 1 tablet (50 mg total) by mouth 3 (three) times daily. 90 tablet 0    pantoprazole (PROTONIX) 40 MG tablet Take 1 tablet (40 mg total) by mouth once daily. 90 tablet 1    polycarbophil (FIBERCON) 625 mg tablet Take 625 mg by mouth once daily.      ranolazine (RANEXA) 500 MG Tb12 Take 1 tablet (500 mg total) by mouth 2 (two) times daily. 180 tablet 1    ticagrelor (BRILINTA) 90 mg tablet Take 1 tablet (90 mg total) by mouth every 12 (twelve) hours. 60 tablet 0    vit C/E/Zn/coppr/lutein/zeaxan (PRESERVISION AREDS-2 ORAL) Take 1 tablet by mouth once daily.      cholestyramine (QUESTRAN) 4 gram packet Take 1 packet (4 g total) by mouth 2 (two) times daily. 180 packet 3    fluticasone-salmeterol diskus inhaler 250-50 mcg Inhale 1 puff into the lungs 2 (two) times daily. (Patient not taking: Reported on 1/24/2023) 3 each 1    gabapentin (NEURONTIN) 100 MG capsule Take 1 capsule (100 mg total) by mouth every evening. 90 capsule 1    nebulizer and compressor Reshma as directed 1 each 0    nitroGLYCERIN 0.4 MG/DOSE TL SPRY (NITROLINGUAL) 400 mcg/spray spray Place 1 spray under the tongue every 5 (five) minutes as needed for Chest pain (max of 3 doses). 4.9 g 1     "umeclidinium (INCRUSE ELLIPTA) 62.5 mcg/actuation inhalation capsule Inhale 62.5 mcg into the lungs every morning. Controller (Patient not taking: Reported on 1/24/2023) 30 each 11    [DISCONTINUED] benazepriL (LOTENSIN) 40 MG tablet Take 1 tablet (40 mg total) by mouth once daily. 90 tablet 1    [DISCONTINUED] cimetidine (TAGAMET) 300 MG tablet Take 1 tablet (300 mg total) by mouth every 6 (six) hours. Take 1 tablet (300 mg total) by mouth starting at 6 PM on Sunday April 17, 2022 (the evening before your angiogram procedure). Then take 1 tablet at 12 AM, then take 1 tablet at 6 AM on Monday April 18th. 3 tablet 0    [DISCONTINUED] lisinopriL 10 MG tablet Take 1 tablet (10 mg total) by mouth once daily. HOLD UNTIL OTHERWISE DIRECTED BY PRIMARY CARE PROVIDER 90 tablet 3    [DISCONTINUED] lovastatin (MEVACOR) 40 MG tablet Take 1 tablet (40 mg total) by mouth every other day. 45 tablet 3     Scheduled Meds:   amLODIPine  5 mg Oral Daily    aspirin  81 mg Oral QAM    atorvastatin  40 mg Oral Daily    chlorhexidine  15 mL Mouth/Throat BID    cholestyramine-aspartame  1 packet Oral BID    diltiaZEM  120 mg Oral Daily    furosemide (LASIX) injection  40 mg Intravenous Daily    hydrALAZINE  50 mg Oral TID    isosorbide mononitrate  120 mg Oral Daily    levoFLOXacin  500 mg Intravenous Q48H    metoprolol tartrate  50 mg Oral TID    mupirocin   Nasal BID    pantoprazole  80 mg Oral Daily    ticagrelor  90 mg Oral Q12H     Continuous Infusions:  PRN Meds:.sodium chloride, acetaminophen, albuterol-ipratropium, ALPRAZolam, nitroGLYCERIN 0.4 MG/DOSE TL SPRY    Review of Systems:  Neg    Physical Exam:    BP (!) 115/53   Pulse 74   Temp 98.8 °F (37.1 °C) (Axillary)   Resp (!) 23   Ht 5' 3" (1.6 m)   Wt 81.4 kg (179 lb 7.3 oz)   SpO2 99%   Breastfeeding No   BMI 31.79 kg/m²     Constitutional: nad, aao x 3  Heart: rrr, no m/r/g, wwp, + edema  Lungs: coarse, no w/r/r/c, no lb  Abdomen: s/nt/nd, +BS    Results:  Lab Results "   Component Value Date     01/31/2023    K 4.0 01/31/2023     01/31/2023    CO2 30 (H) 01/31/2023    BUN 43 (H) 01/31/2023    CREATININE 3.3 (H) 01/31/2023    CALCIUM 8.3 (L) 01/31/2023    ANIONGAP 9 01/31/2023    ESTGFRAFRICA 39.0 (A) 06/30/2022    EGFRNONAA 33.8 (A) 06/30/2022       Lab Results   Component Value Date    CALCIUM 8.3 (L) 01/31/2023    PHOS 4.3 01/29/2023       Recent Labs   Lab 01/31/23  0555   WBC 8.62   RBC 3.14*   HGB 9.3*   HCT 30.8*      MCV 98   MCH 29.6   MCHC 30.2*            I have personally reviewed pertinent radiological imaging and reports.    Patient care was time spent personally by me on the following activities: > 35 min  Obtaining a history  Examination of patient.  Providing medical care at the patients bedside.  Developing a treatment plan with patient or surrogate and bedside caregivers  Ordering and reviewing laboratory studies, radiographic studies, pulse oximetry.  Ordering and performing treatments and interventions.  Evaluation of patient's response to treatment.  Discussions with consultants while on the unit and immediately available to the patient.  Re-evaluation of the patient's condition.  Documentation in the medical record.     Brabi Huggins MD MPH  [facility]  [address] [place] [address]  [place] [address]  [phone] (p)  [phone] (f)

## 2023-01-31 NOTE — CARE UPDATE
01/30/23 2316   Patient Assessment/Suction   Respiratory Effort Unlabored   WILMER Breath Sounds clear   LLL Breath Sounds diminished   RUL Breath Sounds clear   RML Breath Sounds diminished   RLL Breath Sounds diminished   Rhythm/Pattern, Respiratory pattern regular   Skin Integrity   $ Wound Care Tech Time 15 min   Area Observed Bridge of nose   Skin Appearance without discoloration   Barrier used? Gel Cushion   PRE-TX-O2   Device (Oxygen Therapy) BIPAP   Oxygen Concentration (%) 30   SpO2 97 %   Pulse Oximetry Type Continuous   Pulse 64   Resp 20   Aerosol Therapy   $ Aerosol Therapy Charges PRN treatment not required   Ready to Wean/Extubation Screen   FIO2<=50 (chart decimal) 0.3   Preset CPAP/BiPAP Settings   Mode Of Delivery BiPAP   $ Is patient using? Yes   Equipment Type V60   Ipap 18   EPAP (cm H2O) 8   Pressure Support (cm H2O) 10   Set Rate (Breaths/Min) 12   ITime (sec) 1   Rise Time (sec) 3   Patient CPAP/BiPAP Settings   RR Total (Breaths/Min) 20   Tidal Volume (mL) 516   VE Minute Ventilation (L/min) 12.1 L/min   Peak Inspiratory Pressure (cm H2O) 18   TiTOT (%) 34   Total Leak (L/Min) 40   Patient Trigger - ST Mode Only (%) 99   CPAP/BiPAP Backup Settings   IPAP Backup 18 cmH2O   EPAP Backup 8 cmH2O   Backup Rate 12 breaths per minute (bpm)   FIO2 Backup 0.3 %   ITIME Backup 1 seconds   Rise Time Backup 3 seconds   CPAP/BiPAP Alarms   High Pressure (cm H2O) 40   Low Pressure (cm H2O) 5   Minute Ventilation (L/Min) 5   High RR (breaths/min) 40   Low RR (breaths/min) 10   Apnea (Sec) 20   Education   $ Education 15 min;BiPAP   Respiratory Evaluation   $ Care Plan Tech Time 15 min   $ Eval/Re-eval Charges Re-evaluation   Evaluation For Re-Eval 3 day

## 2023-01-31 NOTE — PLAN OF CARE
Problem: Oral Intake Inadequate (Acute Kidney Injury/Impairment)  Goal: Optimal Nutrition Intake  Outcome: Ongoing, Progressing  Intervention: Promote and Optimize Nutrition  Flowsheets (Taken 1/31/2023 1727)  Oral Nutrition Promotion:   calorie-dense foods provided   other (see comments)     Problem: Impaired Wound Healing  Goal: Optimal Wound Healing  Outcome: Ongoing, Progressing  Intervention: Promote Wound Healing  Flowsheets (Taken 1/31/2023 1727)  Oral Nutrition Promotion:   calorie-dense foods provided   other (see comments)     Recommendations  1.) Continue renal diet restrictions, fluid per MD.   2.) Will add Carlo BID x14 days to aid in wound healing.   3.) Monitor renal function.   4.)  to aid in food preferences.    Goals:   1.) Pt to consume/tolerate >75% of meals.   2.) Progression of wound healing.   3.) Renal function to return to baseline or standard reference range.  Nutrition Goal Status: new

## 2023-01-31 NOTE — ASSESSMENT & PLAN NOTE
Maintain iv abx for suspected  Aspiration pneumonia   Was on iv teflaro too for possible MRSA pneumonia in view with recent hospitalization

## 2023-02-01 ENCOUNTER — PATIENT OUTREACH (OUTPATIENT)
Dept: HOME HEALTH SERVICES | Facility: HOSPITAL | Age: 78
End: 2023-02-01

## 2023-02-01 ENCOUNTER — EXTERNAL HOME HEALTH (OUTPATIENT)
Dept: HOME HEALTH SERVICES | Facility: HOSPITAL | Age: 78
End: 2023-02-01
Payer: MEDICARE

## 2023-02-01 LAB
ALBUMIN MFR UR ELPH: 38.4 %
ALBUMIN SERPL BCP-MCNC: 2.7 G/DL (ref 3.5–5.2)
ALBUMIN SERPL ELPH-MCNC: 2.6 G/DL (ref 2.9–4.4)
ALBUMIN/GLOB SERPL: 1 {RATIO} (ref 0.7–1.7)
ALP SERPL-CCNC: 45 U/L (ref 55–135)
ALPHA-2 GLOBULIN URINE RANDOM (ELEC): 9.8 %
ALPHA1 GLOB MFR UR ELPH: 8.2 %
ALPHA1 GLOB SERPL ELPH-MCNC: 0.4 G/DL (ref 0–0.4)
ALPHA2 GLOB SERPL ELPH-MCNC: 0.8 G/DL (ref 0.4–1)
ALT SERPL W/O P-5'-P-CCNC: 8 U/L (ref 10–44)
ANION GAP SERPL CALC-SCNC: 9 MMOL/L (ref 8–16)
AST SERPL-CCNC: 13 U/L (ref 10–40)
B-GLOBULIN MFR UR ELPH: 14.8 %
B-GLOBULIN SERPL ELPH-MCNC: 0.8 G/DL (ref 0.7–1.3)
BACTERIA BLD CULT: NORMAL
BACTERIA BLD CULT: NORMAL
BILIRUB SERPL-MCNC: 1.1 MG/DL (ref 0.1–1)
BUN SERPL-MCNC: 42 MG/DL (ref 8–23)
CALCIUM SERPL-MCNC: 8.3 MG/DL (ref 8.7–10.5)
CHLORIDE SERPL-SCNC: 105 MMOL/L (ref 95–110)
CO2 SERPL-SCNC: 30 MMOL/L (ref 23–29)
CREAT SERPL-MCNC: 3.5 MG/DL (ref 0.5–1.4)
ERYTHROCYTE [DISTWIDTH] IN BLOOD BY AUTOMATED COUNT: 14.7 % (ref 11.5–14.5)
EST. GFR  (NO RACE VARIABLE): 13.1 ML/MIN/1.73 M^2
GAMMA GLOB MFR UR ELPH: 28.8 %
GAMMA GLOB SERPL ELPH-MCNC: 0.5 G/DL (ref 0.4–1.8)
GLOBULIN SER CALC-MCNC: 2.5 G/DL (ref 2.2–3.9)
GLUCOSE SERPL-MCNC: 73 MG/DL (ref 70–110)
HCT VFR BLD AUTO: 28.3 % (ref 37–48.5)
HGB BLD-MCNC: 8.4 G/DL (ref 12–16)
LABORATORY COMMENT REPORT: ABNORMAL
LABORATORY COMMENT REPORT: NORMAL
M PROTEIN MFR UR ELPH: NORMAL %
M PROTEIN SERPL ELPH-MCNC: ABNORMAL G/DL
MCH RBC QN AUTO: 29.6 PG (ref 27–31)
MCHC RBC AUTO-ENTMCNC: 29.7 G/DL (ref 32–36)
MCV RBC AUTO: 100 FL (ref 82–98)
PLATELET # BLD AUTO: 178 K/UL (ref 150–450)
PMV BLD AUTO: 11.9 FL (ref 9.2–12.9)
POTASSIUM SERPL-SCNC: 4.2 MMOL/L (ref 3.5–5.1)
PROT SERPL-MCNC: 5.1 G/DL (ref 6–8.5)
PROT SERPL-MCNC: 5.4 G/DL (ref 6–8.4)
PROT UR-MCNC: 12.2 MG/DL
RBC # BLD AUTO: 2.84 M/UL (ref 4–5.4)
SODIUM SERPL-SCNC: 144 MMOL/L (ref 136–145)
WBC # BLD AUTO: 7.33 K/UL (ref 3.9–12.7)

## 2023-02-01 PROCEDURE — 99900031 HC PATIENT EDUCATION (STAT)

## 2023-02-01 PROCEDURE — 27000221 HC OXYGEN, UP TO 24 HOURS

## 2023-02-01 PROCEDURE — 21000000 HC CCU ICU ROOM CHARGE

## 2023-02-01 PROCEDURE — 97110 THERAPEUTIC EXERCISES: CPT | Mod: CQ

## 2023-02-01 PROCEDURE — 85027 COMPLETE CBC AUTOMATED: CPT | Performed by: INTERNAL MEDICINE

## 2023-02-01 PROCEDURE — 99232 SBSQ HOSP IP/OBS MODERATE 35: CPT | Mod: ,,, | Performed by: INTERNAL MEDICINE

## 2023-02-01 PROCEDURE — 80053 COMPREHEN METABOLIC PANEL: CPT | Performed by: INTERNAL MEDICINE

## 2023-02-01 PROCEDURE — 25000003 PHARM REV CODE 250: Performed by: INTERNAL MEDICINE

## 2023-02-01 PROCEDURE — 94761 N-INVAS EAR/PLS OXIMETRY MLT: CPT

## 2023-02-01 PROCEDURE — 36415 COLL VENOUS BLD VENIPUNCTURE: CPT | Performed by: INTERNAL MEDICINE

## 2023-02-01 PROCEDURE — 94660 CPAP INITIATION&MGMT: CPT

## 2023-02-01 PROCEDURE — 94799 UNLISTED PULMONARY SVC/PX: CPT

## 2023-02-01 PROCEDURE — 99232 PR SUBSEQUENT HOSPITAL CARE,LEVL II: ICD-10-PCS | Mod: ,,, | Performed by: INTERNAL MEDICINE

## 2023-02-01 PROCEDURE — 99900035 HC TECH TIME PER 15 MIN (STAT)

## 2023-02-01 RX ADMIN — PANTOPRAZOLE SODIUM 80 MG: 40 TABLET, DELAYED RELEASE ORAL at 05:02

## 2023-02-01 RX ADMIN — AMLODIPINE BESYLATE 5 MG: 5 TABLET ORAL at 10:02

## 2023-02-01 RX ADMIN — HYDRALAZINE HYDROCHLORIDE 50 MG: 25 TABLET ORAL at 05:02

## 2023-02-01 RX ADMIN — CHOLESTYRAMINE LIGHT 4 G: 4 POWDER, FOR SUSPENSION ORAL at 08:02

## 2023-02-01 RX ADMIN — ISOSORBIDE MONONITRATE 120 MG: 60 TABLET, EXTENDED RELEASE ORAL at 10:02

## 2023-02-01 RX ADMIN — ASPIRIN 81 MG: 81 TABLET, COATED ORAL at 10:02

## 2023-02-01 RX ADMIN — METOPROLOL TARTRATE 50 MG: 50 TABLET, FILM COATED ORAL at 05:02

## 2023-02-01 RX ADMIN — CHLORHEXIDINE GLUCONATE 15 ML: 1.2 RINSE ORAL at 10:02

## 2023-02-01 RX ADMIN — CHLORHEXIDINE GLUCONATE 15 ML: 1.2 RINSE ORAL at 08:02

## 2023-02-01 RX ADMIN — ATORVASTATIN CALCIUM 40 MG: 40 TABLET, FILM COATED ORAL at 10:02

## 2023-02-01 RX ADMIN — MUPIROCIN 1 G: 20 OINTMENT TOPICAL at 08:02

## 2023-02-01 RX ADMIN — TICAGRELOR 90 MG: 90 TABLET ORAL at 08:02

## 2023-02-01 RX ADMIN — MUPIROCIN 1 G: 20 OINTMENT TOPICAL at 10:02

## 2023-02-01 RX ADMIN — DILTIAZEM HYDROCHLORIDE 120 MG: 120 CAPSULE, COATED, EXTENDED RELEASE ORAL at 10:02

## 2023-02-01 RX ADMIN — METOPROLOL TARTRATE 50 MG: 50 TABLET, FILM COATED ORAL at 10:02

## 2023-02-01 RX ADMIN — METOPROLOL TARTRATE 50 MG: 50 TABLET, FILM COATED ORAL at 08:02

## 2023-02-01 RX ADMIN — HYDRALAZINE HYDROCHLORIDE 50 MG: 25 TABLET ORAL at 08:02

## 2023-02-01 RX ADMIN — CHOLESTYRAMINE LIGHT 4 G: 4 POWDER, FOR SUSPENSION ORAL at 10:02

## 2023-02-01 RX ADMIN — HYDRALAZINE HYDROCHLORIDE 50 MG: 25 TABLET ORAL at 10:02

## 2023-02-01 RX ADMIN — TICAGRELOR 90 MG: 90 TABLET ORAL at 10:02

## 2023-02-01 NOTE — PT/OT/SLP PROGRESS
"Physical Therapy Treatment    Patient Name:  Marisol Kerr   MRN:  [id number]    Recommendations:     Discharge Recommendations: nursing facility, skilled  Discharge Equipment Recommendations: none  Barriers to discharge: None    Assessment:     Marisol Kerr is a 77 y.o. female admitted with a medical diagnosis of Acute respiratory failure.  She presents with the following impairments/functional limitations: weakness, impaired endurance, impaired self care skills, impaired functional mobility, impaired cardiopulmonary response to activity.  Pt with improved participation, endurance, and tolerance to sustained sit EOB from prior day. Improved control of respiratory rate, with Bipap in place throughout.   Sitting endurance 12 minutes with SBA for sitting balance.  Declined attempt to stand to RW.  Assisted in supine scooting up in bed with BUE's at bedrails following return to supine.     Rehab Prognosis: Fair; patient would benefit from acute skilled PT services to address these deficits and reach maximum level of function.    Recent Surgery: Procedure(s) (LRB):  EGD (ESOPHAGOGASTRODUODENOSCOPY) (N/A) 3 Days Post-Op    Plan:     During this hospitalization, patient to be seen 6 x/week to address the identified rehab impairments via gait training, therapeutic activities, therapeutic exercises and progress toward the following goals:    Plan of Care Expires:  02/28/23    Subjective     Chief Complaint: "Don't let me run out of oxygen" "Dont let me fall"   Patient/Family Comments/goals: improved respiratory status  Pain/Comfort:  Pain Rating 1: 0/10  Pain Rating Post-Intervention 1: 0/10      Objective:     Communicated with nurse Carver prior to session.  Patient found HOB elevated with telemetry, BIPAP, bed alarm, pulse ox (continuous), peripheral IV upon PT entry to room.     General Precautions: Standard, fall  Orthopedic Precautions: N/A  Braces: N/A  Respiratory Status:  Bipap     Functional Mobility:  Bed Mobility:   "   Scooting: scooted up in supine with use of bedrails and modA from therapist  Supine to Sit: moderate assistance  Sit to Supine: minimum assistance      AM-PAC 6 CLICK MOBILITY          Treatment & Education:  -Encouragement w/ breathing techniques for reduced anxiety and resp rate  -Sitting knee extension exercises 3 sets of 5    Patient left HOB elevated with all lines intact, call button in reach, bed alarm on, and nurse notified..    GOALS:   Multidisciplinary Problems       Physical Therapy Goals          Problem: Physical Therapy    Goal Priority Disciplines Outcome Goal Variances Interventions   Physical Therapy Goal     PT, PT/OT      Description: Goals to be met by: 2023    Patient will increase functional independence with mobility by performin. Supine to sit with MInimal Assistance  2. Sit to stand transfer with Minimal Assistance  3. Gait  x 50  feet with Contact Guard Assistance using Rolling Walker.                          Time Tracking:     PT Received On: 23  PT Start Time: 1128     PT Stop Time: 1153  PT Total Time (min): 25 min     Billable Minutes: Therapeutic Exercise 25    Treatment Type: Treatment  PT/PTA: PTA     PTA Visit Number: 2     2023

## 2023-02-01 NOTE — PROGRESS NOTES
INPATIENT NEPHROLOGY Progress Note  [facility] NEPHROLOGY    Marisol Kerr  02/01/2023    Reason for consultation:    Acute kidney injury    Chief Complaint:   Chief Complaint   Patient presents with    Shortness of Breath          History of Present Illness:    Per H and P    77-year-old female presents emergency room with complaints of having severe shortness of breath that began 30 minutes prior to arrival.  The patient was transported to the ED by EMS.  She was placed on CPAP EN route.  The patient is normally on home O2 at 5 L and noted to have had an oxygen saturation of 80% at home.  Patient apparently lives at home alone and she apparently called EMS.  Patient has a prior history of CHF, COPD, colon cancer, coronary artery disease.  There were no reports of her complaining of chest pain or having any nausea vomiting.  The patient is in such respiratory extremis little other history is obtainable.    1/27  seen in the ER.   She states she feels a lot better after the lasix.  No nausea, chest pain, sob, fever, urinary or bowel complaint, new neurologic symptoms, new joint pain  1/28  1650 cc uop overnight.  AFVSS.  No nausea, chest pain, sob, fever, urinary or bowel complaint, new neurologic symptoms, new joint pain  1/29  just had EGD.  Still very groggy.  Output not appropriately recorded.    1/30 VSS, on 4L NC, UOP 2.7L, feeling better, on/off BIPAP  1/31 VSS, on 3L NC/BIPAP, UOP 1.8L, daniel out  2/1 BP better, on BIPAP, UOP 900cc    Plan of Care:     Acute kidney injury suspicious of hemodynamically mediated renal injury from heart failure with CRS  - renal function stabilized with worsening alkalosis and decreased UOP- switch to oral diuretics tomorrow    Pneumonia/pulmonary edema (BNP 1106)  - renal dose abx for crcl < 20     Anemia  - iron stores adequate  - transfused 1/29  - epogen 10k sq given 1/28  - paraproteins negative    Hypertension  - controlled on ISMN/hydral  - hold RAAS agents  - continue  renal diet with 1.5L fluid restriction     Secondary hyperparathyroidism  - parameters are at goal    Thank you for allowing us to participate in this patient's care. We will continue to follow.    Vital Signs:  Temp Readings from Last 3 Encounters:   02/01/23 98.1 °F (36.7 °C) (Axillary)   01/19/23 98 °F (36.7 °C) (Oral)   01/10/23 98.5 °F (36.9 °C) (Oral)       Pulse Readings from Last 3 Encounters:   02/01/23 74   01/24/23 62   01/19/23 70       BP Readings from Last 3 Encounters:   02/01/23 138/61   01/24/23 (!) 118/58   01/19/23 (!) 142/82       Weight:  Wt Readings from Last 3 Encounters:   01/30/23 81.4 kg (179 lb 7.3 oz)   01/24/23 83.1 kg (183 lb 3.2 oz)   01/16/23 84.3 kg (185 lb 13.6 oz)     Medications:  No current facility-administered medications on file prior to encounter.     Current Outpatient Medications on File Prior to Encounter   Medication Sig Dispense Refill    albuterol (VENTOLIN HFA) 90 mcg/actuation inhaler Inhale 2 puffs into the lungs every 6 (six) hours as needed for Wheezing or Shortness of Breath. Rescue 36 g 3    albuterol-ipratropium (DUO-NEB) 2.5 mg-0.5 mg/3 mL nebulizer solution Take 3 mLs by nebulization every 4 (four) hours as needed for Wheezing or Shortness of Breath. Rescue 120 each 6    allopurinoL (ZYLOPRIM) 100 MG tablet Take 0.5 tablets (50 mg total) by mouth once daily. 45 tablet 1    amLODIPine (NORVASC) 10 MG tablet Take 0.5 tablets (5 mg total) by mouth once daily. 15 tablet 11    aspirin (ECOTRIN) 81 MG EC tablet Take 1 tablet (81 mg total) by mouth every morning. 90 tablet 3    atorvastatin (LIPITOR) 40 MG tablet Take 1 tablet (40 mg total) by mouth once daily. 30 tablet 0    cholecalciferol, vitamin D3, 125 mcg (5,000 unit) Tab Take 5,000 Units by mouth once daily.      coenzyme Q10 100 mg capsule Take 100 mg by mouth every morning.      diltiaZEM (CARDIZEM CD) 120 MG Cp24 Take 1 capsule (120 mg total) by mouth once daily. 90 capsule 1    ergocalciferol (VITAMIN D2)  50,000 unit Cap Take 1 capsule (50,000 Units total) by mouth every 7 days. 12 capsule 1    ferrous sulfate 325 (65 FE) MG EC tablet Take 325 mg by mouth once daily.      fish oil-omega-3 fatty acids 300-1,000 mg capsule Take 2 capsules by mouth every morning.      furosemide (LASIX) 40 MG tablet Take 1 tablet (40 mg total) by mouth once daily. 30 tablet 0    hydrALAZINE (APRESOLINE) 50 MG tablet Take 1 tablet (50 mg total) by mouth 3 (three) times daily. 90 tablet 0    ipratropium-albuteroL (COMBIVENT RESPIMAT)  mcg/actuation inhaler Inhale 2 puffs into the lungs every 4 (four) hours as needed for Shortness of Breath. Rescue 12 g 3    isosorbide mononitrate (IMDUR) 120 MG 24 hr tablet Take 1 tablet (120 mg total) by mouth once daily. 90 tablet 1    metoprolol tartrate (LOPRESSOR) 50 MG tablet Take 1 tablet (50 mg total) by mouth 3 (three) times daily. 90 tablet 0    pantoprazole (PROTONIX) 40 MG tablet Take 1 tablet (40 mg total) by mouth once daily. 90 tablet 1    polycarbophil (FIBERCON) 625 mg tablet Take 625 mg by mouth once daily.      ranolazine (RANEXA) 500 MG Tb12 Take 1 tablet (500 mg total) by mouth 2 (two) times daily. 180 tablet 1    ticagrelor (BRILINTA) 90 mg tablet Take 1 tablet (90 mg total) by mouth every 12 (twelve) hours. 60 tablet 0    vit C/E/Zn/coppr/lutein/zeaxan (PRESERVISION AREDS-2 ORAL) Take 1 tablet by mouth once daily.      cholestyramine (QUESTRAN) 4 gram packet Take 1 packet (4 g total) by mouth 2 (two) times daily. 180 packet 3    fluticasone-salmeterol diskus inhaler 250-50 mcg Inhale 1 puff into the lungs 2 (two) times daily. (Patient not taking: Reported on 1/24/2023) 3 each 1    gabapentin (NEURONTIN) 100 MG capsule Take 1 capsule (100 mg total) by mouth every evening. 90 capsule 1    nebulizer and compressor Reshma as directed 1 each 0    nitroGLYCERIN 0.4 MG/DOSE TL SPRY (NITROLINGUAL) 400 mcg/spray spray Place 1 spray under the tongue every 5 (five) minutes as needed for  "Chest pain (max of 3 doses). 4.9 g 1    umeclidinium (INCRUSE ELLIPTA) 62.5 mcg/actuation inhalation capsule Inhale 62.5 mcg into the lungs every morning. Controller (Patient not taking: Reported on 1/24/2023) 30 each 11    [DISCONTINUED] benazepriL (LOTENSIN) 40 MG tablet Take 1 tablet (40 mg total) by mouth once daily. 90 tablet 1    [DISCONTINUED] cimetidine (TAGAMET) 300 MG tablet Take 1 tablet (300 mg total) by mouth every 6 (six) hours. Take 1 tablet (300 mg total) by mouth starting at 6 PM on Sunday April 17, 2022 (the evening before your angiogram procedure). Then take 1 tablet at 12 AM, then take 1 tablet at 6 AM on Monday April 18th. 3 tablet 0    [DISCONTINUED] lisinopriL 10 MG tablet Take 1 tablet (10 mg total) by mouth once daily. HOLD UNTIL OTHERWISE DIRECTED BY PRIMARY CARE PROVIDER 90 tablet 3    [DISCONTINUED] lovastatin (MEVACOR) 40 MG tablet Take 1 tablet (40 mg total) by mouth every other day. 45 tablet 3     Scheduled Meds:   amLODIPine  5 mg Oral Daily    aspirin  81 mg Oral QAM    atorvastatin  40 mg Oral Daily    chlorhexidine  15 mL Mouth/Throat BID    cholestyramine-aspartame  1 packet Oral BID    diltiaZEM  120 mg Oral Daily    furosemide (LASIX) injection  40 mg Intravenous Daily    hydrALAZINE  50 mg Oral TID    isosorbide mononitrate  120 mg Oral Daily    levoFLOXacin  500 mg Intravenous Q48H    metoprolol tartrate  50 mg Oral TID    mupirocin   Nasal BID    pantoprazole  80 mg Oral Daily    ticagrelor  90 mg Oral Q12H     Continuous Infusions:  PRN Meds:.sodium chloride, acetaminophen, albuterol-ipratropium, ALPRAZolam, nitroGLYCERIN 0.4 MG/DOSE TL SPRY    Review of Systems:  Neg    Physical Exam:    /61   Pulse 74   Temp 98.1 °F (36.7 °C) (Axillary)   Resp (!) 30   Ht 5' 3" (1.6 m)   Wt 81.4 kg (179 lb 7.3 oz)   SpO2 100%   Breastfeeding No   BMI 31.79 kg/m²     Constitutional: nad, aao x 3  Heart: rrr, no m/r/g, wwp, no edema  Lungs: coarse, no w/r/r/c, no lb, on " BIPAP  Abdomen: s/nt/nd, +BS    Results:  Lab Results   Component Value Date     02/01/2023    K 4.2 02/01/2023     02/01/2023    CO2 30 (H) 02/01/2023    BUN 42 (H) 02/01/2023    CREATININE 3.5 (H) 02/01/2023    CALCIUM 8.3 (L) 02/01/2023    ANIONGAP 9 02/01/2023    ESTGFRAFRICA 39.0 (A) 06/30/2022    EGFRNONAA 33.8 (A) 06/30/2022       Lab Results   Component Value Date    CALCIUM 8.3 (L) 02/01/2023    PHOS 4.3 01/29/2023       Recent Labs   Lab 02/01/23  0456   WBC 7.33   RBC 2.84*   HGB 8.4*   HCT 28.3*      *   MCH 29.6   MCHC 29.7*            I have personally reviewed pertinent radiological imaging and reports.    Patient care was time spent personally by me on the following activities: > 35 min  Obtaining a history  Examination of patient.  Providing medical care at the patients bedside.  Developing a treatment plan with patient or surrogate and bedside caregivers  Ordering and reviewing laboratory studies, radiographic studies, pulse oximetry.  Ordering and performing treatments and interventions.  Evaluation of patient's response to treatment.  Discussions with consultants while on the unit and immediately available to the patient.  Re-evaluation of the patient's condition.  Documentation in the medical record.     Barbi Huggins MD MPH  [facility] [address] [place] [address]  [phone] (p)  [phone] (f)

## 2023-02-01 NOTE — PLAN OF CARE
02/01/23 0830   Patient Assessment/Suction   Level of Consciousness (AVPU) alert   Respiratory Effort Normal;Unlabored   Expansion/Accessory Muscles/Retractions no use of accessory muscles   All Lung Fields Breath Sounds diminished   Rhythm/Pattern, Respiratory unlabored;pattern regular;depth regular   PRE-TX-O2   Device (Oxygen Therapy) BIPAP   $ Is the patient on Low Flow Oxygen? Yes   Oxygen Concentration (%) 30   SpO2 100 %   Pulse Oximetry Type Continuous   $ Pulse Oximetry - Multiple Charge Pulse Oximetry - Multiple   Pulse 76   Resp (!) 21   Aerosol Therapy   $ Aerosol Therapy Charges PRN treatment not required   Preset CPAP/BiPAP Settings   Mode Of Delivery BiPAP S/T   $ CPAP/BiPAP Daily Charge BiPAP/CPAP Daily   $ Is patient using? Yes   Size of Mask Small/Medium   Sized Appropriately? Yes   Equipment Type V60   Ipap 18   EPAP (cm H2O) 8   Pressure Support (cm H2O) 10   Set Rate (Breaths/Min) 12   ITime (sec) 1   Rise Time (sec) 3   Patient CPAP/BiPAP Settings   Timed Inspiration (Sec) 1   IPAP Rise Time (sec) 3   RR Total (Breaths/Min) 25   Tidal Volume (mL) 525   VE Minute Ventilation (L/min) 12.7 L/min   Peak Inspiratory Pressure (cm H2O) 17   TiTOT (%) 36   Total Leak (L/Min) 84   Patient Trigger - ST Mode Only (%) 23   CPAP/BiPAP Backup Settings   IPAP Backup 18 cmH2O   EPAP Backup 8 cmH2O   Backup Rate 12 breaths per minute (bpm)   ITIME Backup 1 seconds   Rise Time Backup 3 seconds   CPAP/BiPAP Alarms   High Pressure (cm H2O) 40   Low Pressure (cm H2O) 5   Minute Ventilation (L/Min) 5   High RR (breaths/min) 40   Low RR (breaths/min) 10   Apnea (Sec) 20   Education   $ Education BiPAP;Bronchodilator

## 2023-02-01 NOTE — CARE UPDATE
01/31/23 2026   Skin Integrity   $ Wound Care Tech Time 15 min   Area Observed Bridge of nose   Skin Appearance without discoloration   Barrier used? Gel Cushion   PRE-TX-O2   Device (Oxygen Therapy) BIPAP   SpO2 97 %   Pulse Oximetry Type Continuous   Pulse 82   Resp (!) 23   Preset CPAP/BiPAP Settings   Mode Of Delivery BiPAP   $ Is patient using? Yes   Equipment Type V60   Airway Device Type medium full face mask   Ipap 18   EPAP (cm H2O) 8   Pressure Support (cm H2O) 10   Set Rate (Breaths/Min) 12   ITime (sec) 1   Rise Time (sec) 3   Patient CPAP/BiPAP Settings   RR Total (Breaths/Min) 23   Tidal Volume (mL) 540   VE Minute Ventilation (L/min) 12.3 L/min   Peak Inspiratory Pressure (cm H2O) 18   TiTOT (%) 30   Total Leak (L/Min) 10   Patient Trigger - ST Mode Only (%) 93   CPAP/BiPAP Backup Settings   IPAP Backup 18 cmH2O   EPAP Backup 8 cmH2O   Backup Rate 12 breaths per minute (bpm)   FIO2 Backup 0.3 %   ITIME Backup 1 seconds   Rise Time Backup 3 seconds   CPAP/BiPAP Alarms   High Pressure (cm H2O) 40   Low Pressure (cm H2O) 5   Minute Ventilation (L/Min) 5   High RR (breaths/min) 40   Low RR (breaths/min) 10   Apnea (Sec) 20   Education   $ Education 15 min;BiPAP   Respiratory Evaluation   $ Care Plan Tech Time 15 min   $ Eval/Re-eval Charges Re-evaluation   Evaluation For   (care plan)

## 2023-02-01 NOTE — PROGRESS NOTES
Pulmonary/Critical Care  Progress Note      Patient name: Marisol Kerr  MRN: [id number]  Date: 02/01/2023    Admit Date: 1/27/2023  Consult Requested By: Dean Vanessa MD    Reason for Consult: COPD, acute respiratory failure    HPI:    1/28/2023 - Pt with h/o COPD (very severe FEV1 - 27%), chronic respiratory failure had recent admission and was DC home with lasix and came to ER with increased SOB, in ER was placed on BiPAP and admitted for further therapy.  She admits to increased SOB, + cough with some mucus, some chest pain which  she relates to cough.  She reports that her O2 at home is usually 4-5 but that her sats were decreased.    1/29/2023 - About the same and on BiPAP when I saw her, no new complaints.  BNP better, procalcitonin remains low, creatinine better.  CXR looks better this AM.    1/30 the patient is off of her BiPAP.  She is on nasal cannula.  She can not communicate as she does not have her hearing aids in.  The nurse reports increasing confusion.  I spoke with her daughter who expects her to live for several more years alone in her home if she just has some rehab.    1/31 The patient is requiring nearly continuous Bipap.  Discussed LTAC with the patient's daughter who would like for her to go to [facility]"" in [place].       2/1 the nurse reports the patient is staying on BiPAP except to eat.  She is on 4 L when she is eating and is maintaining her sats but is very anxious and wants to get back on the BiPAP because she breathes better.  Still awaiting placement at hospitals in [place].    Review of Systems  Unobtainable    No change in the patient's Past Medical History, Past Surgical History, Social History or Family History since admission.      Physical Exam    Vital signs:  Temp:  [97.4 °F (36.3 °C)-98.1 °F (36.7 °C)]   Pulse:  [62-82]   Resp:  [15-30]   BP: ()/(44-70)   SpO2:  [94 %-100 %]     Intake/Output:   Intake/Output Summary (Last 24 hours) at 2/1/2023 1542  Last data filed at 2/1/2023  "0800  Gross per 24 hour   Intake 240 ml   Output 150 ml   Net 90 ml        BMI: Estimated body mass index is 31.79 kg/m² as calculated from the following:    Height as of this encounter: 5' 3" (1.6 m).    Weight as of this encounter: 81.4 kg (179 lb 7.3 oz).    PHYSICAL EXAM  GENERAL: Older patient in no distress.  Sleeping deeply  HEENT: Pupils equal and reactive. Extraocular movements intact. Nose intact.  Pharynx moist.  NECK: Supple.   HEART: Regular rate and rhythm. No murmur or gallop auscultated.  LUNGS: Clear to auscultation and percussion. Lung excursion symmetrical. No change in fremitus. No adventitial noises.  ABDOMEN: Bowel sounds present. Non-tender, no masses palpated.  Obese.  : Normal anatomy.  EXTREMITIES: Normal muscle tone and joint movement, no cyanosis or clubbing.   LYMPHATICS: No adenopathy palpated, legs are very wrinkled from diuresis.    SKIN: Dry, intact, no lesions. There is pretibial erythema from chronic venous stasis.        Laboratory    Recent Labs   Lab 02/01/23  0456   WBC 7.33   RBC 2.84*   HGB 8.4*   HCT 28.3*      *   MCH 29.6   MCHC 29.7*       Recent Labs   Lab 02/01/23 0456   CALCIUM 8.3*   PROT 5.4*      K 4.2   CO2 30*      BUN 42*   CREATININE 3.5*   ALKPHOS 45*   ALT 8*   AST 13   BILITOT 1.1*           Microbiology:       Microbiology Results (last 7 days)       Procedure Component Value Units Date/Time    Blood culture #1 **CANNOT BE ORDERED STAT** [[id number]] Collected: 01/27/23 0720    Order Status: Completed Specimen: Blood from Peripheral, Forearm, Left Updated: 02/01/23 1032     Blood Culture, Routine No growth after 5 days.    Blood culture #2 **CANNOT BE ORDERED STAT** [[id number]] Collected: 01/27/23 0745    Order Status: Completed Specimen: Blood from Peripheral, Antecubital, Right Updated: 02/01/23 1032     Blood Culture, Routine No growth after 5 days.            Radiology    X-Ray Chest AP Portable  Chest single view    CLINICAL " DATA: Shortness of breath, follow-up    FINDINGS: AP view is compared to January 27. The heart and mediastinum are unremarkable. Previously noted right basilar infiltrate appears improved. Minor residual atelectasis or infiltrate is noted. Right pleural effusion has improved, with decreasing blunting of the right costophrenic angle. There is a trace amount of pleural fluid on the left.    IMPRESSION:  1. Interval improvement, with decreasing right basilar infiltrate and small pleural effusion.  2. No other significant changes.    Electronically signed by:  Zohaib Lucio MD  1/29/2023 6:37 AM CST Workstation: [id number]          Oxygen Information    Oxygen Concentration (%):  [0.3-30] 30         Recent Labs     01/30/23  1133   PH 7.317*   PCO2 57.6*   PO2 94   HCO3 29.5*   POCSATURATED 96   BE 3         Impression    Active Hospital Problems    Diagnosis  POA    *Acute respiratory failure [J96.00]  Unknown    Congestive heart failure [I50.9]  Yes    Debility [R53.81]  Yes    Anemia [D64.9]  Unknown    Shortness of breath [R06.02]  Unknown    Acute pneumonia [J18.9]  Unknown    Congestive heart failure with left ventricular diastolic dysfunction, acute on chronic [I50.33]  Yes    ISSA (acute kidney injury) [N17.9]  Unknown    Chronic diastolic congestive heart failure [I50.32]  Yes    Chronic hypoxemic respiratory failure [J96.11]  Yes     3 L at home. Is on 3L during first day of admission.       Class 2 obesity in adult [E66.9]  Yes    CKD (chronic kidney disease), stage III [N18.30]  Yes    Coronary artery disease, multivessel with history of previous PCI [I25.10]  Yes    Essential hypertension, benign [I10]  Yes      Resolved Hospital Problems   No resolved problems to display.   Acute on chronic hypercapnic hypoxemic respiratory failure  Persisting partially compensated respiratory acidosis  Bilateral pleural effusions  End-stage COPD  Obesity hypoventilation syndrome  Diastolic heart failure  Chronic  venous stasis changes to the lower legs  Anemia of chronic disease, a bit worse  Acute on chronic renal failure  Moderate hypoalbuminemia/ obesity  Anxiety    Plan    BiPAP for 16-18 hours a day  Check ABG in a.m.  Continue scheduled bronchodilators with Prn respiratory treatments   Finish Levaquin, 1 more day  Diurese as able  Renal following for ISSA   Increase activity as able  Patient will either need an LTAC on discharge  Discussed with her nurse  Check chest x-ray in a.m.

## 2023-02-01 NOTE — PT/OT/SLP PROGRESS
Occupational Therapy      Patient Name:  Marisol Kerr   MRN:  [id number]    Patient not seen today secondary to  (In with Physical Therapy). Will follow-up tomorrow.    2/1/2023

## 2023-02-02 VITALS
TEMPERATURE: 98 F | HEIGHT: 63 IN | SYSTOLIC BLOOD PRESSURE: 102 MMHG | HEART RATE: 69 BPM | OXYGEN SATURATION: 100 % | DIASTOLIC BLOOD PRESSURE: 51 MMHG | WEIGHT: 179.44 LBS | RESPIRATION RATE: 23 BRPM | BODY MASS INDEX: 31.79 KG/M2

## 2023-02-02 PROBLEM — J18.9 ACUTE PNEUMONIA: Status: RESOLVED | Noted: 2023-01-27 | Resolved: 2023-02-02

## 2023-02-02 PROBLEM — J96.00 ACUTE RESPIRATORY FAILURE: Status: RESOLVED | Noted: 2023-01-27 | Resolved: 2023-02-02

## 2023-02-02 PROBLEM — N17.9 AKI (ACUTE KIDNEY INJURY): Status: RESOLVED | Noted: 2019-09-18 | Resolved: 2023-02-02

## 2023-02-02 PROBLEM — R06.02 SHORTNESS OF BREATH: Status: RESOLVED | Noted: 2023-01-28 | Resolved: 2023-02-02

## 2023-02-02 LAB
ALBUMIN SERPL BCP-MCNC: 2.8 G/DL (ref 3.5–5.2)
ALLENS TEST: ABNORMAL
ALP SERPL-CCNC: 51 U/L (ref 55–135)
ALT SERPL W/O P-5'-P-CCNC: 11 U/L (ref 10–44)
ANION GAP SERPL CALC-SCNC: 7 MMOL/L (ref 8–16)
AST SERPL-CCNC: 14 U/L (ref 10–40)
BILIRUB SERPL-MCNC: 0.4 MG/DL (ref 0.1–1)
BUN SERPL-MCNC: 53 MG/DL (ref 8–23)
CALCIUM SERPL-MCNC: 8.3 MG/DL (ref 8.7–10.5)
CHLORIDE SERPL-SCNC: 105 MMOL/L (ref 95–110)
CO2 SERPL-SCNC: 30 MMOL/L (ref 23–29)
CREAT SERPL-MCNC: 3.6 MG/DL (ref 0.5–1.4)
DELSYS: ABNORMAL
EP: 8
ERYTHROCYTE [DISTWIDTH] IN BLOOD BY AUTOMATED COUNT: 14.9 % (ref 11.5–14.5)
ERYTHROCYTE [SEDIMENTATION RATE] IN BLOOD BY WESTERGREN METHOD: 12 MM/H
EST. GFR  (NO RACE VARIABLE): 12.6 ML/MIN/1.73 M^2
FIO2: 30
GLUCOSE SERPL-MCNC: 84 MG/DL (ref 70–110)
GLUCOSE SERPL-MCNC: 89 MG/DL (ref 70–110)
HCO3 UR-SCNC: 30.3 MMOL/L (ref 24–28)
HCT VFR BLD AUTO: 26.7 % (ref 37–48.5)
HCT VFR BLD CALC: 25 %PCV (ref 36–54)
HGB BLD-MCNC: 7.9 G/DL (ref 12–16)
IP: 18
MCH RBC QN AUTO: 29.5 PG (ref 27–31)
MCHC RBC AUTO-ENTMCNC: 29.6 G/DL (ref 32–36)
MCV RBC AUTO: 100 FL (ref 82–98)
MIN VOL: 10.6
MODE: ABNORMAL
PCO2 BLDA: 53.4 MMHG (ref 35–45)
PH SMN: 7.36 [PH] (ref 7.35–7.45)
PLATELET # BLD AUTO: 171 K/UL (ref 150–450)
PMV BLD AUTO: 11.4 FL (ref 9.2–12.9)
PO2 BLDA: 71 MMHG (ref 80–100)
POC BE: 5 MMOL/L
POC IONIZED CALCIUM: 1.22 MMOL/L (ref 1.06–1.42)
POC SATURATED O2: 93 % (ref 95–100)
POC TCO2: 32 MMOL/L (ref 23–27)
POTASSIUM BLD-SCNC: 4 MMOL/L (ref 3.5–5.1)
POTASSIUM SERPL-SCNC: 4.1 MMOL/L (ref 3.5–5.1)
PROT SERPL-MCNC: 5.4 G/DL (ref 6–8.4)
RBC # BLD AUTO: 2.68 M/UL (ref 4–5.4)
SAMPLE: ABNORMAL
SITE: ABNORMAL
SODIUM BLD-SCNC: 144 MMOL/L (ref 136–145)
SODIUM SERPL-SCNC: 142 MMOL/L (ref 136–145)
SP02: 97
SPONT RATE: 23
WBC # BLD AUTO: 8.19 K/UL (ref 3.9–12.7)

## 2023-02-02 PROCEDURE — 97535 SELF CARE MNGMENT TRAINING: CPT | Mod: CO

## 2023-02-02 PROCEDURE — 99900031 HC PATIENT EDUCATION (STAT)

## 2023-02-02 PROCEDURE — 85027 COMPLETE CBC AUTOMATED: CPT | Performed by: INTERNAL MEDICINE

## 2023-02-02 PROCEDURE — 25000003 PHARM REV CODE 250: Performed by: INTERNAL MEDICINE

## 2023-02-02 PROCEDURE — 99232 SBSQ HOSP IP/OBS MODERATE 35: CPT | Mod: ,,, | Performed by: INTERNAL MEDICINE

## 2023-02-02 PROCEDURE — 99900035 HC TECH TIME PER 15 MIN (STAT)

## 2023-02-02 PROCEDURE — 27000221 HC OXYGEN, UP TO 24 HOURS

## 2023-02-02 PROCEDURE — 63600175 PHARM REV CODE 636 W HCPCS: Performed by: INTERNAL MEDICINE

## 2023-02-02 PROCEDURE — 36415 COLL VENOUS BLD VENIPUNCTURE: CPT | Performed by: INTERNAL MEDICINE

## 2023-02-02 PROCEDURE — 63600175 PHARM REV CODE 636 W HCPCS: Mod: JG | Performed by: INTERNAL MEDICINE

## 2023-02-02 PROCEDURE — 94660 CPAP INITIATION&MGMT: CPT

## 2023-02-02 PROCEDURE — 94761 N-INVAS EAR/PLS OXIMETRY MLT: CPT

## 2023-02-02 PROCEDURE — 99232 PR SUBSEQUENT HOSPITAL CARE,LEVL II: ICD-10-PCS | Mod: ,,, | Performed by: INTERNAL MEDICINE

## 2023-02-02 PROCEDURE — 80053 COMPREHEN METABOLIC PANEL: CPT | Performed by: INTERNAL MEDICINE

## 2023-02-02 RX ORDER — HYDRALAZINE HYDROCHLORIDE 50 MG/1
50 TABLET, FILM COATED ORAL EVERY 12 HOURS
Qty: 60 TABLET | Refills: 0
Start: 2023-02-02 | End: 2023-06-27 | Stop reason: SDUPTHER

## 2023-02-02 RX ORDER — CHOLESTYRAMINE 4 G/4.8G
1 POWDER, FOR SUSPENSION ORAL 2 TIMES DAILY
Status: DISCONTINUED | OUTPATIENT
Start: 2023-02-02 | End: 2023-02-02 | Stop reason: HOSPADM

## 2023-02-02 RX ORDER — FUROSEMIDE 40 MG/1
40 TABLET ORAL DAILY
Status: DISCONTINUED | OUTPATIENT
Start: 2023-02-02 | End: 2023-02-02 | Stop reason: HOSPADM

## 2023-02-02 RX ORDER — HYDRALAZINE HYDROCHLORIDE 25 MG/1
50 TABLET, FILM COATED ORAL EVERY 12 HOURS
Status: DISCONTINUED | OUTPATIENT
Start: 2023-02-02 | End: 2023-02-02 | Stop reason: HOSPADM

## 2023-02-02 RX ADMIN — TICAGRELOR 90 MG: 90 TABLET ORAL at 09:02

## 2023-02-02 RX ADMIN — ISOSORBIDE MONONITRATE 120 MG: 60 TABLET, EXTENDED RELEASE ORAL at 09:02

## 2023-02-02 RX ADMIN — METOPROLOL TARTRATE 50 MG: 50 TABLET, FILM COATED ORAL at 03:02

## 2023-02-02 RX ADMIN — DILTIAZEM HYDROCHLORIDE 120 MG: 120 CAPSULE, COATED, EXTENDED RELEASE ORAL at 09:02

## 2023-02-02 RX ADMIN — LEVOFLOXACIN 500 MG: 5 INJECTION, SOLUTION INTRAVENOUS at 09:02

## 2023-02-02 RX ADMIN — ATORVASTATIN CALCIUM 40 MG: 40 TABLET, FILM COATED ORAL at 09:02

## 2023-02-02 RX ADMIN — EPOETIN ALFA-EPBX 4100 UNITS: 10000 INJECTION, SOLUTION INTRAVENOUS; SUBCUTANEOUS at 10:02

## 2023-02-02 RX ADMIN — PANTOPRAZOLE SODIUM 80 MG: 40 TABLET, DELAYED RELEASE ORAL at 05:02

## 2023-02-02 RX ADMIN — CHOLESTYRAMINE LIGHT 4 G: 4 POWDER, FOR SUSPENSION ORAL at 09:02

## 2023-02-02 RX ADMIN — METOPROLOL TARTRATE 50 MG: 50 TABLET, FILM COATED ORAL at 09:02

## 2023-02-02 RX ADMIN — ASPIRIN 81 MG: 81 TABLET, COATED ORAL at 09:02

## 2023-02-02 RX ADMIN — FUROSEMIDE 40 MG: 40 TABLET ORAL at 09:02

## 2023-02-02 NOTE — CARE UPDATE
02/01/23 2144   Patient Assessment/Suction   Level of Consciousness (AVPU) alert   Respiratory Effort Unlabored   Expansion/Accessory Muscles/Retractions no use of accessory muscles   All Lung Fields Breath Sounds clear;diminished   Rhythm/Pattern, Respiratory no shortness of breath reported   Cough Frequency no cough   PRE-TX-O2   Device (Oxygen Therapy) BIPAP   $ Is the patient on Low Flow Oxygen? Yes   Oxygen Concentration (%) 30   SpO2 97 %   Pulse Oximetry Type Continuous   $ Pulse Oximetry - Multiple Charge Pulse Oximetry - Multiple   Pulse 64   Resp (!) 26   Positioning   Head of Bed (HOB) Positioning HOB elevated;HOB at 30 degrees   Ready to Wean/Extubation Screen   FIO2<=50 (chart decimal) 0.3   Preset CPAP/BiPAP Settings   Mode Of Delivery BiPAP S/T   $ Is patient using? Yes   Size of Mask Small/Medium   Sized Appropriately? Yes   Equipment Type V60   Airway Device Type medium full face mask   Humidifier not applicable   Ipap 18   EPAP (cm H2O) 8   Pressure Support (cm H2O) 10   Set Rate (Breaths/Min) 12   ITime (sec) 1   Rise Time (sec) 3   Patient CPAP/BiPAP Settings   FiO2 Auto Set yes   RR Total (Breaths/Min) 21   Tidal Volume (mL) 636   VE Minute Ventilation (L/min) 13 L/min   Peak Inspiratory Pressure (cm H2O) 15   TiTOT (%) 28   Total Leak (L/Min) 15   Patient Trigger - ST Mode Only (%) 80   Education   $ Education BiPAP;15 min   Respiratory Evaluation   $ Care Plan Tech Time 15 min   $ Eval/Re-eval Charges Re-evaluation

## 2023-02-02 NOTE — SUBJECTIVE & OBJECTIVE
Interval History:     Review of Systems   Constitutional:  Negative for activity change and appetite change.   HENT:  Negative for congestion and dental problem.    Eyes:  Negative for discharge and itching.   Respiratory:  Positive for shortness of breath.    Cardiovascular:  Negative for chest pain.   Gastrointestinal:  Negative for abdominal distention and abdominal pain.   Endocrine: Negative for cold intolerance.   Genitourinary:  Negative for difficulty urinating and dysuria.   Musculoskeletal:  Negative for arthralgias and back pain.   Skin:  Negative for color change.   Neurological:  Negative for dizziness and facial asymmetry.   Hematological:  Negative for adenopathy.   Psychiatric/Behavioral:  Negative for agitation and behavioral problems.    Objective:     Vital Signs (Most Recent):  Temp: 98.9 °F (37.2 °C) (02/01/23 1500)  Pulse: 65 (02/01/23 1900)  Resp: (!) 26 (02/01/23 1900)  BP: (!) 109/55 (02/01/23 1900)  SpO2: 96 % (02/01/23 1900)   Vital Signs (24h Range):  Temp:  [97.4 °F (36.3 °C)-98.9 °F (37.2 °C)] 98.9 °F (37.2 °C)  Pulse:  [62-82] 65  Resp:  [15-30] 26  SpO2:  [94 %-100 %] 96 %  BP: (101-169)/(44-70) 109/55     Weight: 81.4 kg (179 lb 7.3 oz)  Body mass index is 31.79 kg/m².    Intake/Output Summary (Last 24 hours) at 2/1/2023 1946  Last data filed at 2/1/2023 1800  Gross per 24 hour   Intake 600 ml   Output 200 ml   Net 400 ml      Physical Exam  Vitals and nursing note reviewed.   Constitutional:       General: She is not in acute distress.  HENT:      Head: Atraumatic.      Right Ear: External ear normal.      Left Ear: External ear normal.      Nose: Nose normal.      Mouth/Throat:      Mouth: Mucous membranes are moist.   Cardiovascular:      Rate and Rhythm: Normal rate.   Pulmonary:      Effort: Pulmonary effort is normal.   Abdominal:      Palpations: Abdomen is soft.   Musculoskeletal:         General: Normal range of motion.      Cervical back: Normal range of motion.   Skin:      General: Skin is warm.   Neurological:      Mental Status: She is alert and oriented to person, place, and time.   Psychiatric:         Behavior: Behavior normal.       Significant Labs: All pertinent labs within the past 24 hours have been reviewed.  CBC:   Recent Labs   Lab 01/31/23  0555 02/01/23  0456   WBC 8.62 7.33   HGB 9.3* 8.4*   HCT 30.8* 28.3*    178     CMP:   Recent Labs   Lab 01/31/23  0555 02/01/23 0456    144   K 4.0 4.2    105   CO2 30* 30*   GLU 79 73   BUN 43* 42*   CREATININE 3.3* 3.5*   CALCIUM 8.3* 8.3*   PROT 5.6* 5.4*   ALBUMIN 2.8* 2.7*   BILITOT 0.9 1.1*   ALKPHOS 49* 45*   AST 14 13   ALT 11 8*   ANIONGAP 9 9       Significant Imaging: I have reviewed all pertinent imaging results/findings within the past 24 hours.

## 2023-02-02 NOTE — PROGRESS NOTES
[facility] Medicine  Progress Note    Patient Name: Marisol Kerr  MRN: [id number]  Patient Class: IP- Inpatient   Admission Date: 1/27/2023  Length of Stay: 5 days  Attending Physician: Dean Vanessa MD  Primary Care Provider: Khadar Dwyer MD        Subjective:     Principal Problem:Acute respiratory failure        HPI:  77 year old patient getting admitted with acute on chronic respiratory failure due to R sided Pneumonia and possible acute CHF flare  Pt was in this hospital very recently and went home with Lasix as per instructions from Nephrology MD   She started having Cough and shortness of breath 2 days ago  Today symptoms went worse and she came to ER , was put on BIPAP and got admitted  She was also found to be on ISSA with CKD        Overview/Hospital Course:  1/28  Got pRBC  FOBT positive  EGD in AM  Continously having chest pain which is normal for her  Received Xanax for anxiety episodes       1/29  Overall better  Had EGD today   Family thinks pt need rehab  Chronic chest pain is a normal thing for this pt  Also anxiety episodes    1/30 condition remains same  Rehab process started  D/C ed iv teflaro  Pt in bed all the time  Chest pain and anxiety episodes is a recurrent issue     1/31  Still on BIPAP  Family wishes to pursue LTAC  Condition improving     02/01  SOB/anxiety persists  Totally dependent on BIPAP  Awaiting LTAC placement      Interval History:     Review of Systems   Constitutional:  Negative for activity change and appetite change.   HENT:  Negative for congestion and dental problem.    Eyes:  Negative for discharge and itching.   Respiratory:  Positive for shortness of breath.    Cardiovascular:  Negative for chest pain.   Gastrointestinal:  Negative for abdominal distention and abdominal pain.   Endocrine: Negative for cold intolerance.   Genitourinary:  Negative for difficulty urinating and dysuria.   Musculoskeletal:  Negative for arthralgias and back pain.    Skin:  Negative for color change.   Neurological:  Negative for dizziness and facial asymmetry.   Hematological:  Negative for adenopathy.   Psychiatric/Behavioral:  Negative for agitation and behavioral problems.    Objective:     Vital Signs (Most Recent):  Temp: 98.9 °F (37.2 °C) (02/01/23 1500)  Pulse: 65 (02/01/23 1900)  Resp: (!) 26 (02/01/23 1900)  BP: (!) 109/55 (02/01/23 1900)  SpO2: 96 % (02/01/23 1900)   Vital Signs (24h Range):  Temp:  [97.4 °F (36.3 °C)-98.9 °F (37.2 °C)] 98.9 °F (37.2 °C)  Pulse:  [62-82] 65  Resp:  [15-30] 26  SpO2:  [94 %-100 %] 96 %  BP: (101-169)/(44-70) 109/55     Weight: 81.4 kg (179 lb 7.3 oz)  Body mass index is 31.79 kg/m².    Intake/Output Summary (Last 24 hours) at 2/1/2023 1946  Last data filed at 2/1/2023 1800  Gross per 24 hour   Intake 600 ml   Output 200 ml   Net 400 ml      Physical Exam  Vitals and nursing note reviewed.   Constitutional:       General: She is not in acute distress.  HENT:      Head: Atraumatic.      Right Ear: External ear normal.      Left Ear: External ear normal.      Nose: Nose normal.      Mouth/Throat:      Mouth: Mucous membranes are moist.   Cardiovascular:      Rate and Rhythm: Normal rate.   Pulmonary:      Effort: Pulmonary effort is normal.   Abdominal:      Palpations: Abdomen is soft.   Musculoskeletal:         General: Normal range of motion.      Cervical back: Normal range of motion.   Skin:     General: Skin is warm.   Neurological:      Mental Status: She is alert and oriented to person, place, and time.   Psychiatric:         Behavior: Behavior normal.       Significant Labs: All pertinent labs within the past 24 hours have been reviewed.  CBC:   Recent Labs   Lab 01/31/23  0555 02/01/23 0456   WBC 8.62 7.33   HGB 9.3* 8.4*   HCT 30.8* 28.3*    178     CMP:   Recent Labs   Lab 01/31/23  0555 02/01/23 0456    144   K 4.0 4.2    105   CO2 30* 30*   GLU 79 73   BUN 43* 42*   CREATININE 3.3* 3.5*   CALCIUM 8.3*  8.3*   PROT 5.6* 5.4*   ALBUMIN 2.8* 2.7*   BILITOT 0.9 1.1*   ALKPHOS 49* 45*   AST 14 13   ALT 11 8*   ANIONGAP 9 9       Significant Imaging: I have reviewed all pertinent imaging results/findings within the past 24 hours.      Assessment/Plan:      * Acute respiratory failure  Acute on chronic respiratory failure due to Pneumonia/CHF flare   Maintain iv abx/Diuretics     Acute pneumonia  Maintain iv abx for suspected  Aspiration pneumonia   Was on iv teflaro too for possible MRSA pneumonia in view with recent hospitalization     Coronary artery disease, multivessel with history of previous PCI  Significant coronary disease with blockages in the right coronary and circumflex mid LAD (per recent angiogram;coronary)  Recently had subendocardial ischemia and associated chest pain on 1/17  Medical management at the moment   Continue Brilinta  Pt regularly uses NG spray which is normal for her       ISSA (acute kidney injury)  ISAS on CKD  Maintain iv lasix  Mostly has Cardiorenal syndrome  Poor prognosis        Debility  Need rehab vs LTAC upon discharge from hospital    Shortness of breath  As above  Chronic issue       Anemia  S/p pRBC on 1/28/2023  FOBT positive  EGD on 1/29 showed Non-bleeding duodenal diverticulum and Chronic erosive gastritis with hemorrhage  Maintain PPI      Congestive heart failure with left ventricular diastolic dysfunction, acute on chronic  Maintain Iv lasix       Chronic diastolic congestive heart failure  Aware     Chronic hypoxemic respiratory failure  On home oxygen     Class 2 obesity in adult  Body mass index is 32.49 kg/m². Morbid obesity complicates all aspects of disease management from diagnostic modalities to treatment.          CKD (chronic kidney disease), stage III  Presently has ISSA on CKD 3      Essential hypertension, benign  Continue present regime        VTE Risk Mitigation (From admission, onward)         Ordered     IP VTE HIGH RISK PATIENT  Once         01/27/23 8573      Place sequential compression device  Until discontinued         01/27/23 1333                Discharge Planning   LUZ: 2/2/2023     Code Status: Full Code   Is the patient medically ready for discharge?:     Reason for patient still in hospital (select all that apply): Pending disposition  Discharge Plan A: Long-term acute care facility (LTAC)                  Dean Vanessa MD  Department of Hospital Medicine   Formerly [facility]

## 2023-02-02 NOTE — PLAN OF CARE
Patient cleared for discharge from case management standpoint.    PRASHANT sent discharge orders to KEYSHAWN Mccarthy. Nurse Hendrix called report to [facility] nurse.    Transportation from facility to hospitals arranged by [facility] with [facility].    Chart and discharge orders reviewed.  Patient discharged to hospitals with no further case management needs.       02/02/23 1443   Final Note   Assessment Type Final Discharge Note   Anticipated Discharge Disposition LTAC   Hospital Resources/Appts/Education Provided Provided patient/caregiver with written discharge plan information   Post-Acute Status   Post-Acute Authorization Placement   Post-Acute Placement Status Set-up Complete/Auth obtained   Discharge Delays (!) Ambulance Transport/Facility Transport

## 2023-02-02 NOTE — PROGRESS NOTES
Pulmonary/Critical Care  Progress Note      Patient name: Marisol Kerr  MRN: [id number]  Date: 02/02/2023    Admit Date: 1/27/2023  Consult Requested By: Dean Vanessa MD    Reason for Consult: COPD, acute respiratory failure    HPI:    1/28/2023 - Pt with h/o COPD (very severe FEV1 - 27%), chronic respiratory failure had recent admission and was DC home with lasix and came to ER with increased SOB, in ER was placed on BiPAP and admitted for further therapy.  She admits to increased SOB, + cough with some mucus, some chest pain which  she relates to cough.  She reports that her O2 at home is usually 4-5 but that her sats were decreased.    1/29/2023 - About the same and on BiPAP when I saw her, no new complaints.  BNP better, procalcitonin remains low, creatinine better.  CXR looks better this AM.    1/30 the patient is off of her BiPAP.  She is on nasal cannula.  She can not communicate as she does not have her hearing aids in.  The nurse reports increasing confusion.  I spoke with her daughter who expects her to live for several more years alone in her home if she just has some rehab.    1/31 The patient is requiring nearly continuous Bipap.  Discussed LTAC with the patient's daughter who would like for her to go to [facility] in [place].       2/1 the nurse reports the patient is staying on BiPAP except to eat.  She is on 4 L when she is eating and is maintaining her sats but is very anxious and wants to get back on the BiPAP because she breathes better.  Still awaiting placement at [facility] in [place].    2/2 the patient's chest x-ray has cleared.  Her ABG has become balanced.  She looks much brighter today.  She is currently on nasal cannula.    Review of Systems  Unobtainable still extremely hard of hearing    No change in the patient's Past Medical History, Past Surgical History, Social History or Family History since admission.      Physical Exam    Vital signs:  Temp:  [97.7 °F (36.5 °C)-98.9 °F (37.2 °C)]   Pulse:   "[58-85]   Resp:  [16-42]   BP: ()/(46-71)   SpO2:  [94 %-100 %]     Intake/Output:   Intake/Output Summary (Last 24 hours) at 2/2/2023 1030  Last data filed at 2/2/2023 0543  Gross per 24 hour   Intake 360 ml   Output 550 ml   Net -190 ml        BMI: Estimated body mass index is 31.79 kg/m² as calculated from the following:    Height as of this encounter: 5' 3" (1.6 m).    Weight as of this encounter: 81.4 kg (179 lb 7.3 oz).    PHYSICAL EXAM  GENERAL: Older patient in no distress.  Patient stood on the side of the bed for a minute today.  HEENT: Pupils equal and reactive. Extraocular movements intact. Nose intact.  Pharynx moist.  NECK: Supple.   HEART: Regular rate and rhythm. No murmur or gallop auscultated.  LUNGS: Clear to auscultation and percussion. Lung excursion symmetrical. No change in fremitus. No adventitial noises.  ABDOMEN: Bowel sounds present. Non-tender, no masses palpated.  Obese.  : Normal anatomy.  EXTREMITIES: Normal muscle tone and joint movement, no cyanosis or clubbing.   LYMPHATICS: No adenopathy palpated, legs are very wrinkled from diuresis.    SKIN: Dry, intact, no lesions. There is pretibial erythema from chronic venous stasis.        Laboratory    Recent Labs   Lab 02/02/23  0418 02/02/23  0446   WBC 8.19  --    RBC 2.68*  --    HGB 7.9*  --    HCT 26.7* 25*     --    *  --    MCH 29.5  --    MCHC 29.6*  --        Recent Labs   Lab 02/02/23  0418   CALCIUM 8.3*   PROT 5.4*      K 4.1   CO2 30*      BUN 53*   CREATININE 3.6*   ALKPHOS 51*   ALT 11   AST 14   BILITOT 0.4           Microbiology:       Microbiology Results (last 7 days)       Procedure Component Value Units Date/Time    Blood culture #1 **CANNOT BE ORDERED STAT** [[id number]] Collected: 01/27/23 0720    Order Status: Completed Specimen: Blood from Peripheral, Forearm, Left Updated: 02/01/23 1032     Blood Culture, Routine No growth after 5 days.    Blood culture #2 **CANNOT BE ORDERED " STAT** [[id number]] Collected: 01/27/23 0745    Order Status: Completed Specimen: Blood from Peripheral, Antecubital, Right Updated: 02/01/23 1032     Blood Culture, Routine No growth after 5 days.            Radiology    X-Ray Chest AP Portable  Narrative: EXAMINATION:  XR CHEST AP PORTABLE    CLINICAL HISTORY:  infiltrate;    FINDINGS:  Portable chest at 449 compared with 01/29/2023 shows normal cardiomediastinal silhouette. Patient is significantly rotated.    Previous right basilar pleuroparenchymal opacity has resolved, as has previous trace left pleural effusion.  Pulmonary vasculature remains within normal limits.  No new confluent alveolar consolidation or pneumothorax.  No acute osseous abnormality.  Impression: Resolution of prior right basilar pleuroparenchymal opacity and trace left pleural effusion.    Electronically signed by: Flavio Lopez MD  Date:    02/02/2023  Time:    07:06          Oxygen Information    Oxygen Concentration (%):  [30] 30         Recent Labs     02/02/23  0446   PH 7.362   PCO2 53.4*   PO2 71*   HCO3 30.3*   POCSATURATED 93*   BE 5         Impression    Active Hospital Problems    Diagnosis  POA    *Acute respiratory failure [J96.00]  Unknown    Congestive heart failure [I50.9]  Yes    Debility [R53.81]  Yes    Anemia [D64.9]  Unknown    Shortness of breath [R06.02]  Unknown    Acute pneumonia [J18.9]  Unknown    Congestive heart failure with left ventricular diastolic dysfunction, acute on chronic [I50.33]  Yes    ISSA (acute kidney injury) [N17.9]  Unknown    Chronic diastolic congestive heart failure [I50.32]  Yes    Chronic hypoxemic respiratory failure [J96.11]  Yes     3 L at home. Is on 3L during first day of admission.       Class 2 obesity in adult [E66.9]  Yes    CKD (chronic kidney disease), stage III [N18.30]  Yes    Coronary artery disease, multivessel with history of previous PCI [I25.10]  Yes    Essential hypertension, benign [I10]  Yes      Resolved Hospital Problems    No resolved problems to display.   Acute on chronic hypercapnic hypoxemic respiratory failure, back to baseline  Compensated respiratory acidosis  Bilateral pleural effusions, resolved  End-stage COPD  Obesity hypoventilation syndrome  Diastolic heart failure  Chronic venous stasis changes to the lower legs  Anemia of chronic disease, a bit worse  Acute on chronic renal failure  Moderate hypoalbuminemia/ obesity  Anxiety    Plan    BiPAP as needed during the day and q.h.s.  Continue scheduled bronchodilators with Prn respiratory treatments   Discontinue Levaquin  Diurese as able  Renal following for ISSA   Increase activity as able  Patient will either need an LTAC on discharge  Discussed with her nurse

## 2023-02-02 NOTE — PT/OT/SLP PROGRESS
"Physical Therapy      Patient Name:  Marisol Kerr   MRN:  [id number]    Patient not seen today secondary to Patient fatigue (Pt reported sitting at EOB and eating her breakfast this AM. "When I say I'm too tired, I'm too tired."). Will follow-up .    "

## 2023-02-02 NOTE — PLAN OF CARE
02/02/23 0845   Patient Assessment/Suction   Level of Consciousness (AVPU) alert   Respiratory Effort Normal;Unlabored   Expansion/Accessory Muscles/Retractions no retractions;no use of accessory muscles   All Lung Fields Breath Sounds diminished   Rhythm/Pattern, Respiratory unlabored;pattern regular;depth regular   PRE-TX-O2   Device (Oxygen Therapy) high flow nasal cannula  (BIPAP TAKEN OFF)   $ Is the patient on Low Flow Oxygen? Yes   Flow (L/min) 3   SpO2 100 %   Pulse Oximetry Type Continuous   $ Pulse Oximetry - Multiple Charge Pulse Oximetry - Multiple   Pulse 85   Resp (!) 37   Aerosol Therapy   $ Aerosol Therapy Charges PRN treatment not required   Preset CPAP/BiPAP Settings   Mode Of Delivery BiPAP S/T;Standby   $ CPAP/BiPAP Daily Charge BiPAP/CPAP Daily   Education   $ Education BiPAP;15 min

## 2023-02-02 NOTE — NURSING
8133 telephone report given to nurse steffen at [facility]    1606 pt discharged to [facility] with all personal belongings (hearing aid in R ear, hearing aid batteries, dentures, cell phone and cell phone )per Marjan

## 2023-02-02 NOTE — PT/OT/SLP PROGRESS
"Occupational Therapy   Treatment    Name: Marisol Kerr  MRN: [id number]  Admitting Diagnosis:  Acute respiratory failure  4 Days Post-Op    Recommendations:     Discharge Recommendations: nursing facility, skilled  Discharge Equipment Recommendations:  none  Barriers to discharge:  Decreased caregiver support    Assessment:     Marisol Kerr is a 77 y.o. female with a medical diagnosis of Acute respiratory failure.  She presents with [place], wanting to rest as she reports fatigue post mobility with RN earlier for standing and side stepping with Maira along EOB with HHA of RN. Pt is getting to EOB for all meals and reports she fatigues too quickly for "multiple therapy exercises all day" and "Ya'll need to come at the same time." Pt was not agreeable to breathing exercises suggested by TYLER. She shows improved bed mobility with Maira/HHA.  Performance deficits affecting function are weakness, impaired endurance, impaired cardiopulmonary response to activity, decreased safety awareness, impaired self care skills, impaired functional mobility, gait instability, impaired balance, decreased upper extremity function, decreased lower extremity function.     Rehab Prognosis:  Good; patient would benefit from acute skilled OT services to address these deficits and reach maximum level of function.       Plan:     Patient to be seen 5 x/week to address the above listed problems via self-care/home management, therapeutic activities, therapeutic exercises  Plan of Care Expires: 02/28/23  Plan of Care Reviewed with: patient    Subjective     Pain/Comfort:  Pain Rating 1: 0/10    Objective:     Communicated with: Ruma GILBERT prior to session.  Patient found HOB elevated with telemetry, oxygen, bed alarm, pulse ox (continuous), peripheral IV upon OT entry to room.    General Precautions: Standard, fall, diabetic, respiratory    Orthopedic Precautions:N/A  Braces: N/A  Respiratory Status: Nasal cannula, flow 3 L/min     Occupational " Performance:     Bed Mobility:    Patient completed Scooting/Bridging with stand by assistance  Patient completed Supine to Sit with minimum assistance and with HHA      Functional Mobility/Transfers:  Pt declines as she has already done this with RN today.  Functional Mobility: BUE and BWL appear to be WFL. No assist needed to advance BLE off EOB.    Activities of Daily Living:  Feeding:  independence at EOB.    [facility] 6 Click ADL: 19    Treatment & Education:  -TYLER ed for short sessions with each discipline to address her activity tolerance and endurance. However she is very focused on advocating for herself at this time that she is too tired for Therapy right now. TYLER providing increased time for self expression and reassuring pt that we can try PT/OT session co-treat for optimal engagement/participation of pt.     Patient left sitting edge of bed with all lines intact, call button in reach, bed alarm off, door opened, RN Ruma notified, and pt eating lunch.    GOALS:   Multidisciplinary Problems       Occupational Therapy Goals          Problem: Occupational Therapy    Goal Priority Disciplines Outcome Interventions   Occupational Therapy Goal     OT, PT/OT Ongoing, Progressing    Description: Goals to be met by: 2/28/2023     Patient will increase functional independence with ADLs by performing:    UE Dressing with Supervision.  LE Dressing with Supervision.  Grooming while standing at sink with Supervision.  Toileting from toilet with Supervision for hygiene and clothing management.   Toilet transfer to toilet with Supervision.  Perform 30 minutes sitting/standing ADL activity with O2 sats 90% or above.                         Time Tracking:     OT Date of Treatment: 02/02/23  OT Start Time: 1225  OT Stop Time: 1237  OT Total Time (min): 12 min    Billable Minutes:Self Care/Home Management 12 min    OT/LONA: LONA     LONA Visit Number: 1    2/2/2023

## 2023-02-02 NOTE — PROGRESS NOTES
INPATIENT NEPHROLOGY Progress Note  [facility] NEPHROLOGY    Marisol Kerr  02/02/2023    Reason for consultation:    Acute kidney injury    Chief Complaint:   Chief Complaint   Patient presents with    Shortness of Breath          History of Present Illness:    Per H and P    77-year-old female presents emergency room with complaints of having severe shortness of breath that began 30 minutes prior to arrival.  The patient was transported to the ED by EMS.  She was placed on CPAP EN route.  The patient is normally on home O2 at 5 L and noted to have had an oxygen saturation of 80% at home.  Patient apparently lives at home alone and she apparently called EMS.  Patient has a prior history of CHF, COPD, colon cancer, coronary artery disease.  There were no reports of her complaining of chest pain or having any nausea vomiting.  The patient is in such respiratory extremis little other history is obtainable.    1/27  seen in the ER.   She states she feels a lot better after the lasix.  No nausea, chest pain, sob, fever, urinary or bowel complaint, new neurologic symptoms, new joint pain  1/28  1650 cc uop overnight.  AFVSS.  No nausea, chest pain, sob, fever, urinary or bowel complaint, new neurologic symptoms, new joint pain  1/29  just had EGD.  Still very groggy.  Output not appropriately recorded.    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)  - renal function is stable-nonoliguric  - no nsaids or IV contrast    HCAP  End stage COPD with HHRF on NIPPV/chronic O2  Diastolic CHF  HTN  - renal dose abx for crcl < 20  - start lasix 40mg PO daily  - lower BP over last 24 hours- stop norvasc-  reduce hydral to BID  - hold RAAS agents  - continue renal diet with 1.5L fluid restriction     Anemia- EGD on 1/29 showed Non-bleeding duodenal diverticulum and Chronic erosive gastritis with hemorrhage  - iron stores adequate  - transfused 1/28  - epogen 10k sq given 1/28- start 50u/kg 3x per week dosing SQ today  - paraproteins negative     Secondary hyperparathyroidism  - parameters are at goal    Thank you for allowing us to participate in this patient's care. We will continue to follow.    Vital Signs:  Temp Readings from Last 3 Encounters:   02/02/23 97.7 °F (36.5 °C)   01/19/23 98 °F (36.7 °C) (Oral)   01/10/23 98.5 °F (36.9 °C) (Oral)       Pulse Readings from Last 3 Encounters:   02/02/23 85   01/24/23 62   01/19/23 70       BP Readings from Last 3 Encounters:   02/02/23 132/61   01/24/23 (!) 118/58   01/19/23 (!) 142/82       Weight:  Wt Readings from Last 3 Encounters:   01/30/23 81.4 kg (179 lb 7.3 oz)   01/24/23 83.1 kg (183 lb 3.2 oz)   01/16/23 84.3 kg (185 lb 13.6 oz)     Medications:  No current facility-administered medications on file prior to encounter.     Current Outpatient Medications on File Prior to Encounter   Medication Sig Dispense Refill    albuterol (VENTOLIN HFA) 90 mcg/actuation inhaler Inhale 2 puffs into the lungs every 6 (six) hours as needed for Wheezing or Shortness of Breath. Rescue 36 g 3    albuterol-ipratropium (DUO-NEB) 2.5 mg-0.5 mg/3 mL nebulizer solution Take 3 mLs by nebulization every 4 (four) hours as needed for Wheezing or Shortness of Breath. Rescue 120 each 6    allopurinoL (ZYLOPRIM) 100 MG tablet Take 0.5 tablets (50 mg total) by mouth once daily. 45 tablet 1    amLODIPine (NORVASC) 10 MG tablet Take 0.5 tablets (5 mg total) by mouth once daily. 15 tablet 11    aspirin (ECOTRIN) 81 MG EC tablet Take 1 tablet (81 mg total) by mouth every morning. 90 tablet 3    atorvastatin (LIPITOR) 40 MG tablet Take 1 tablet (40 mg total) by mouth once daily. 30 tablet 0     cholecalciferol, vitamin D3, 125 mcg (5,000 unit) Tab Take 5,000 Units by mouth once daily.      coenzyme Q10 100 mg capsule Take 100 mg by mouth every morning.      diltiaZEM (CARDIZEM CD) 120 MG Cp24 Take 1 capsule (120 mg total) by mouth once daily. 90 capsule 1    ergocalciferol (VITAMIN D2) 50,000 unit Cap Take 1 capsule (50,000 Units total) by mouth every 7 days. 12 capsule 1    ferrous sulfate 325 (65 FE) MG EC tablet Take 325 mg by mouth once daily.      fish oil-omega-3 fatty acids 300-1,000 mg capsule Take 2 capsules by mouth every morning.      furosemide (LASIX) 40 MG tablet Take 1 tablet (40 mg total) by mouth once daily. 30 tablet 0    hydrALAZINE (APRESOLINE) 50 MG tablet Take 1 tablet (50 mg total) by mouth 3 (three) times daily. 90 tablet 0    ipratropium-albuteroL (COMBIVENT RESPIMAT)  mcg/actuation inhaler Inhale 2 puffs into the lungs every 4 (four) hours as needed for Shortness of Breath. Rescue 12 g 3    isosorbide mononitrate (IMDUR) 120 MG 24 hr tablet Take 1 tablet (120 mg total) by mouth once daily. 90 tablet 1    metoprolol tartrate (LOPRESSOR) 50 MG tablet Take 1 tablet (50 mg total) by mouth 3 (three) times daily. 90 tablet 0    pantoprazole (PROTONIX) 40 MG tablet Take 1 tablet (40 mg total) by mouth once daily. 90 tablet 1    polycarbophil (FIBERCON) 625 mg tablet Take 625 mg by mouth once daily.      ranolazine (RANEXA) 500 MG Tb12 Take 1 tablet (500 mg total) by mouth 2 (two) times daily. 180 tablet 1    ticagrelor (BRILINTA) 90 mg tablet Take 1 tablet (90 mg total) by mouth every 12 (twelve) hours. 60 tablet 0    vit C/E/Zn/coppr/lutein/zeaxan (PRESERVISION AREDS-2 ORAL) Take 1 tablet by mouth once daily.      cholestyramine (QUESTRAN) 4 gram packet Take 1 packet (4 g total) by mouth 2 (two) times daily. 180 packet 3    fluticasone-salmeterol diskus inhaler 250-50 mcg Inhale 1 puff into the lungs 2 (two) times daily. (Patient not taking: Reported on 1/24/2023) 3 each 1     "gabapentin (NEURONTIN) 100 MG capsule Take 1 capsule (100 mg total) by mouth every evening. 90 capsule 1    nebulizer and compressor Reshma as directed 1 each 0    nitroGLYCERIN 0.4 MG/DOSE TL SPRY (NITROLINGUAL) 400 mcg/spray spray Place 1 spray under the tongue every 5 (five) minutes as needed for Chest pain (max of 3 doses). 4.9 g 1    umeclidinium (INCRUSE ELLIPTA) 62.5 mcg/actuation inhalation capsule Inhale 62.5 mcg into the lungs every morning. Controller (Patient not taking: Reported on 1/24/2023) 30 each 11    [DISCONTINUED] benazepriL (LOTENSIN) 40 MG tablet Take 1 tablet (40 mg total) by mouth once daily. 90 tablet 1    [DISCONTINUED] cimetidine (TAGAMET) 300 MG tablet Take 1 tablet (300 mg total) by mouth every 6 (six) hours. Take 1 tablet (300 mg total) by mouth starting at 6 PM on Sunday April 17, 2022 (the evening before your angiogram procedure). Then take 1 tablet at 12 AM, then take 1 tablet at 6 AM on Monday April 18th. 3 tablet 0    [DISCONTINUED] lisinopriL 10 MG tablet Take 1 tablet (10 mg total) by mouth once daily. HOLD UNTIL OTHERWISE DIRECTED BY PRIMARY CARE PROVIDER 90 tablet 3    [DISCONTINUED] lovastatin (MEVACOR) 40 MG tablet Take 1 tablet (40 mg total) by mouth every other day. 45 tablet 3     Scheduled Meds:   amLODIPine  5 mg Oral Daily    aspirin  81 mg Oral QAM    atorvastatin  40 mg Oral Daily    cholestyramine-aspartame  1 packet Oral BID    diltiaZEM  120 mg Oral Daily    hydrALAZINE  50 mg Oral TID    isosorbide mononitrate  120 mg Oral Daily    levoFLOXacin  500 mg Intravenous Q48H    metoprolol tartrate  50 mg Oral TID    pantoprazole  80 mg Oral Daily    ticagrelor  90 mg Oral Q12H     Continuous Infusions:  PRN Meds:.sodium chloride, acetaminophen, albuterol-ipratropium, ALPRAZolam, nitroGLYCERIN 0.4 MG/DOSE TL SPRY    Review of Systems:  Neg    Physical Exam:    /61   Pulse 85   Temp 97.7 °F (36.5 °C)   Resp (!) 37   Ht 5' 3" (1.6 m)   Wt 81.4 kg (179 lb 7.3 oz)  "  SpO2 100%   Breastfeeding No   BMI 31.79 kg/m²     Constitutional: nad, aao x 3  Heart: rrr, no m/r/g, wwp, no edema  Lungs: coarse, no w/r/r/c, no lb, on BIPAP  Abdomen: s/nt/nd, +BS    Results:  Lab Results   Component Value Date     02/02/2023    K 4.1 02/02/2023     02/02/2023    CO2 30 (H) 02/02/2023    BUN 53 (H) 02/02/2023    CREATININE 3.6 (H) 02/02/2023    CALCIUM 8.3 (L) 02/02/2023    ANIONGAP 7 (L) 02/02/2023    ESTGFRAFRICA 39.0 (A) 06/30/2022    EGFRNONAA 33.8 (A) 06/30/2022       Lab Results   Component Value Date    CALCIUM 8.3 (L) 02/02/2023    PHOS 4.3 01/29/2023       Recent Labs   Lab 02/02/23  0418 02/02/23  0446   WBC 8.19  --    RBC 2.68*  --    HGB 7.9*  --    HCT 26.7* 25*     --    *  --    MCH 29.5  --    MCHC 29.6*  --             I have personally reviewed pertinent radiological imaging and reports.    Patient care was time spent personally by me on the following activities: > 35 min  Obtaining a history  Examination of patient.  Providing medical care at the patients bedside.  Developing a treatment plan with patient or surrogate and bedside caregivers  Ordering and reviewing laboratory studies, radiographic studies, pulse oximetry.  Ordering and performing treatments and interventions.  Evaluation of patient's response to treatment.  Discussions with consultants while on the unit and immediately available to the patient.  Re-evaluation of the patient's condition.  Documentation in the medical record.     Barbi Huggins MD MPH  [facility] [address] [address]  [phone] (p)  [phone] (f)

## 2023-02-02 NOTE — DISCHARGE SUMMARY
[facility] Medicine  Discharge Summary      Patient Name: Marisol Kerr  MRN: [id number]  YURY: [id number]  Patient Class: IP- Inpatient  Admission Date: 1/27/2023  Hospital Length of Stay: 6 days  Discharge Date and Time:  02/02/2023 5:23 PM  Attending Physician: No att. providers found   Discharging Provider: Dean Vanessa MD  Primary Care Provider: Khadar Dwyer MD    Primary Care Team: Networked reference to record PCT     HPI:   77 year old patient getting admitted with acute on chronic respiratory failure due to R sided Pneumonia and possible acute CHF flare  Pt was in this hospital very recently and went home with Lasix as per instructions from Nephrology MD   She started having Cough and shortness of breath 2 days ago  Today symptoms went worse and she came to ER , was put on BIPAP and got admitted  She was also found to be on ISSA with CKD        Procedure(s) (LRB):  EGD (ESOPHAGOGASTRODUODENOSCOPY) (N/A)      Hospital Course:   Patient got admitted with Acute on chronic respiratory failure due to Pneumonia/CHF flare  She was managed with diuretics and abx and her condition got better   Pt suffered from ISSA on CKD in the background of cardiorenal syndrome   Per Recent angiogram:Significant coronary disease with blockages in the right coronary and circumflex mid LAD   Recently pt  had subendocardial ischemia and associated chest pain on 1/17/2023(when she was admitted very recently)  Pt regularly uses NG spray which is normal for her and also xanax help her from anxiety episodes   She was found to be anemic and had Prbc on  1/28/2023  EGD on 1/29 showed Non-bleeding duodenal diverticulum and Chronic erosive gastritis with hemorrhage  Eventually pt was discharged to LTAC   Her long term prognosis is very poor        Goals of Care Treatment Preferences:  Code Status: Full Code      Consults:   Consults (From admission, onward)        Status Ordering Provider     Inpatient consult to Social  Services  Once        Provider:  (Not yet assigned)    Acknowledged DEAN VANESSA     Inpatient consult to Social Work/Case Management  Once        Provider:  (Not yet assigned)    Acknowledged BERENICE GILBERT     Inpatient consult to   Once        Provider:  (Not yet assigned)    Acknowledged DEAN VANESSA     Inpatient consult to Gastroenterology  Once        Provider:  Aarti Alvarez MD    Completed DEAN VANESSA     Inpatient consult to Pulmonology  Once        Provider:  Dereje Saul MD    Completed DEAN VANESSA     Inpatient consult to Nephrology  Once        Provider:  Augie Myrick MD    Completed DEAN VANESSA     Inpatient consult to Hospitalist  Once        Provider:  Dean Vanessa MD    Acknowledged DEAN VANESSA          No new Assessment & Plan notes have been filed under this hospital service since the last note was generated.  Service: Hospital Medicine    Final Active Diagnoses:    Diagnosis Date Noted POA    Coronary artery disease, multivessel with history of previous PCI [I25.10] 06/03/2013 Yes    Congestive heart failure [I50.9] 01/29/2023 Yes    Debility [R53.81] 01/29/2023 Yes    Anemia [D64.9] 01/28/2023 Unknown    Congestive heart failure with left ventricular diastolic dysfunction, acute on chronic [I50.33] 06/30/2022 Yes    Chronic diastolic congestive heart failure [I50.32] 09/17/2019 Yes    Chronic hypoxemic respiratory failure [J96.11] 01/16/2019 Yes    Class 2 obesity in adult [E66.9] 05/18/2015 Yes    CKD (chronic kidney disease), stage III [N18.30] 10/08/2014 Yes    Essential hypertension, benign [I10] 04/09/2013 Yes      Problems Resolved During this Admission:    Diagnosis Date Noted Date Resolved POA    PRINCIPAL PROBLEM:  Acute respiratory failure [J96.00] 01/27/2023 02/02/2023 Unknown    Acute pneumonia [J18.9] 01/27/2023 02/02/2023 Unknown    ISSA (acute kidney injury) [N17.9] 09/18/2019 02/02/2023 Unknown    Shortness of breath [R06.02] 01/28/2023  02/02/2023 Unknown       Discharged Condition: good    Disposition: Long Term Acute Care    Follow Up:   Follow-up Information     Jayne Correa MD Follow up in 1 month(s).    Specialties: Pulmonary Disease, Sleep Medicine  Contact information:  [facility]  [address]  [facility] [id number] [phone]                       Patient Instructions:      Diet Cardiac       Significant Diagnostic Studies: Labs:   CMP   Recent Labs   Lab 02/01/23 0456 02/02/23 0418    142   K 4.2 4.1    105   CO2 30* 30*   GLU 73 89   BUN 42* 53*   CREATININE 3.5* 3.6*   CALCIUM 8.3* 8.3*   PROT 5.4* 5.4*   ALBUMIN 2.7* 2.8*   BILITOT 1.1* 0.4   ALKPHOS 45* 51*   AST 13 14   ALT 8* 11   ANIONGAP 9 7*    and CBC   Recent Labs   Lab 02/01/23 0456 02/02/23 0418 02/02/23 0446   WBC 7.33 8.19  --    HGB 8.4* 7.9*  --    HCT 28.3* 26.7* 25*    171  --        Pending Diagnostic Studies:     None         Medications:  Reconciled Home Medications:      Medication List      START taking these medications    INCRUSE ELLIPTA 62.5 mcg/actuation inhalation capsule  Generic drug: umeclidinium  Inhale 62.5 mcg into the lungs every morning. Controller        CHANGE how you take these medications    hydrALAZINE 50 MG tablet  Commonly known as: APRESOLINE  Take 1 tablet (50 mg total) by mouth every 12 (twelve) hours.  What changed: when to take this        CONTINUE taking these medications    albuterol 90 mcg/actuation inhaler  Commonly known as: VENTOLIN HFA  Inhale 2 puffs into the lungs every 6 (six) hours as needed for Wheezing or Shortness of Breath. Rescue     allopurinoL 100 MG tablet  Commonly known as: ZYLOPRIM  Take 0.5 tablets (50 mg total) by mouth once daily.     aspirin 81 MG EC tablet  Commonly known as: ECOTRIN  Take 1 tablet (81 mg total) by mouth every morning.     atorvastatin 40 MG tablet  Commonly known as: LIPITOR  Take 1 tablet (40 mg total) by mouth once daily.     BRILINTA 90 mg tablet  Generic drug:  ticagrelor  Take 1 tablet (90 mg total) by mouth every 12 (twelve) hours.     cholecalciferol (vitamin D3) 125 mcg (5,000 unit) Tab  Take 5,000 Units by mouth once daily.     cholestyramine 4 gram packet  Commonly known as: QUESTRAN  Take 1 packet (4 g total) by mouth 2 (two) times daily.     coenzyme Q10 100 mg capsule  Take 100 mg by mouth every morning.     * CombiVENT RESPIMAT  mcg/actuation inhaler  Generic drug: ipratropium-albuteroL  Inhale 2 puffs into the lungs every 4 (four) hours as needed for Shortness of Breath. Rescue     * albuterol-ipratropium 2.5 mg-0.5 mg/3 mL nebulizer solution  Commonly known as: DUO-NEB  Take 3 mLs by nebulization every 4 (four) hours as needed for Wheezing or Shortness of Breath. Rescue     diltiaZEM 120 MG Cp24  Commonly known as: CARDIZEM CD  Take 1 capsule (120 mg total) by mouth once daily.     ergocalciferol 50,000 unit Cap  Commonly known as: VITAMIN D2  Take 1 capsule (50,000 Units total) by mouth every 7 days.     ferrous sulfate 325 (65 FE) MG EC tablet  Take 325 mg by mouth once daily.     fish oil-omega-3 fatty acids 300-1,000 mg capsule  Take 2 capsules by mouth every morning.     furosemide 40 MG tablet  Commonly known as: LASIX  Take 1 tablet (40 mg total) by mouth once daily.     isosorbide mononitrate 120 MG 24 hr tablet  Commonly known as: IMDUR  Take 1 tablet (120 mg total) by mouth once daily.     metoprolol tartrate 50 MG tablet  Commonly known as: LOPRESSOR  Take 1 tablet (50 mg total) by mouth 3 (three) times daily.     nebulizer and compressor Reshma  as directed     nitroGLYCERIN 0.4 MG/DOSE TL SPRY 400 mcg/spray spray  Commonly known as: NITROLINGUAL  Place 1 spray under the tongue every 5 (five) minutes as needed for Chest pain (max of 3 doses).     pantoprazole 40 MG tablet  Commonly known as: PROTONIX  Take 1 tablet (40 mg total) by mouth once daily.     polycarbophil 625 mg tablet  Commonly known as: FIBERCON  Take 625 mg by mouth once daily.      PRESERVISION AREDS-2 ORAL  Take 1 tablet by mouth once daily.     ranolazine 500 MG Tb12  Commonly known as: RANEXA  Take 1 tablet (500 mg total) by mouth 2 (two) times daily.         * This list has 2 medication(s) that are the same as other medications prescribed for you. Read the directions carefully, and ask your doctor or other care provider to review them with you.            STOP taking these medications    amLODIPine 10 MG tablet  Commonly known as: NORVASC     fluticasone-salmeterol 250-50 mcg/dose 250-50 mcg/dose diskus inhaler  Commonly known as: ADVAIR DISKUS     gabapentin 100 MG capsule  Commonly known as: NEURONTIN     levoFLOXacin 250 MG tablet  Commonly known as: LEVAQUIN            Indwelling Lines/Drains at time of discharge:   Lines/Drains/Airways     None               Physical Exam   Cardiovascular: Normal rate.   Neurological: She is alert.     Time spent on the discharge of patient: 45 minutes         Dean Vanessa MD  Department of Hospital Medicine  [facility]

## 2023-02-03 NOTE — PT/OT/SLP DISCHARGE
Occupational Therapy Discharge Summary    Marisol Kerr  MRN: [id number]   Principal Problem: Acute respiratory failure      Patient Discharged from acute Occupational Therapy on 2/2/2023.  Please refer to prior OT note dated 2/2/2023 for functional status.    Assessment:      Patient appropriate for care in another setting.    Objective:     GOALS:   Multidisciplinary Problems       Occupational Therapy Goals       Not on file              Multidisciplinary Problems (Resolved)          Problem: Occupational Therapy    Goal Priority Disciplines Outcome Interventions   Occupational Therapy Goal   (Resolved)     OT, PT/OT Met    Description: Goals to be met by: 2/28/2023     Patient will increase functional independence with ADLs by performing:    UE Dressing with Supervision.  LE Dressing with Supervision.  Grooming while standing at sink with Supervision.  Toileting from toilet with Supervision for hygiene and clothing management.   Toilet transfer to toilet with Supervision.  Perform 30 minutes sitting/standing ADL activity with O2 sats 90% or above.                         Reasons for Discontinuation of Therapy Services  Transfer to alternate level of care.      Plan:     Patient Discharged to: Long Term Acute Care    2/3/2023

## 2023-02-09 ENCOUNTER — OUTPATIENT CASE MANAGEMENT (OUTPATIENT)
Dept: ADMINISTRATIVE | Facility: OTHER | Age: 78
End: 2023-02-09
Payer: MEDICARE

## 2023-02-14 ENCOUNTER — DOCUMENT SCAN (OUTPATIENT)
Dept: HOME HEALTH SERVICES | Facility: HOSPITAL | Age: 78
End: 2023-02-14
Payer: MEDICARE

## 2023-03-27 ENCOUNTER — TELEPHONE (OUTPATIENT)
Dept: FAMILY MEDICINE | Facility: CLINIC | Age: 78
End: 2023-03-27

## 2023-03-27 NOTE — TELEPHONE ENCOUNTER
----- Message from Vera Pelaez sent at 3/27/2023  3:52 PM CDT -----  Pt daughter called and would like to get a later appt for 03/28. She also like to know if she should be seen this soon she just was released from an acute care facility. (Marcela [phone]

## 2023-03-27 NOTE — TELEPHONE ENCOUNTER
Spoke with patients daughter, she's been out of the facility for a week and a half. She agrees to keep the appt tomorrow and bring all medication bottles.

## 2023-03-28 ENCOUNTER — OFFICE VISIT (OUTPATIENT)
Dept: FAMILY MEDICINE | Facility: CLINIC | Age: 78
End: 2023-03-28
Payer: MEDICARE

## 2023-03-28 ENCOUNTER — TELEPHONE (OUTPATIENT)
Dept: FAMILY MEDICINE | Facility: CLINIC | Age: 78
End: 2023-03-28

## 2023-03-28 VITALS
HEART RATE: 60 BPM | WEIGHT: 161 LBS | BODY MASS INDEX: 29.63 KG/M2 | DIASTOLIC BLOOD PRESSURE: 66 MMHG | SYSTOLIC BLOOD PRESSURE: 130 MMHG | HEIGHT: 62 IN

## 2023-03-28 DIAGNOSIS — H91.90 HEARING LOSS, UNSPECIFIED HEARING LOSS TYPE, UNSPECIFIED LATERALITY: Chronic | ICD-10-CM

## 2023-03-28 DIAGNOSIS — K21.9 GASTROESOPHAGEAL REFLUX DISEASE WITHOUT ESOPHAGITIS: ICD-10-CM

## 2023-03-28 DIAGNOSIS — F51.01 PRIMARY INSOMNIA: ICD-10-CM

## 2023-03-28 DIAGNOSIS — I10 PRIMARY HYPERTENSION: ICD-10-CM

## 2023-03-28 DIAGNOSIS — J96.11 CHRONIC RESPIRATORY FAILURE WITH HYPOXIA: ICD-10-CM

## 2023-03-28 DIAGNOSIS — J44.1 COPD EXACERBATION: ICD-10-CM

## 2023-03-28 DIAGNOSIS — I25.110 CORONARY ARTERY DISEASE INVOLVING NATIVE CORONARY ARTERY OF NATIVE HEART WITH UNSTABLE ANGINA PECTORIS: ICD-10-CM

## 2023-03-28 DIAGNOSIS — Z74.09 IMPAIRED FUNCTIONAL MOBILITY AND ENDURANCE: ICD-10-CM

## 2023-03-28 DIAGNOSIS — I10 ESSENTIAL HYPERTENSION, BENIGN: ICD-10-CM

## 2023-03-28 DIAGNOSIS — Z85.038 HISTORY OF COLON CANCER: ICD-10-CM

## 2023-03-28 DIAGNOSIS — N18.4 CHRONIC KIDNEY DISEASE, STAGE 4 (SEVERE): ICD-10-CM

## 2023-03-28 DIAGNOSIS — E11.8 DM TYPE 2, CONTROLLED, WITH COMPLICATION: ICD-10-CM

## 2023-03-28 DIAGNOSIS — I87.8 VENOUS STASIS: Chronic | ICD-10-CM

## 2023-03-28 DIAGNOSIS — E11.42 DIABETIC PERIPHERAL NEUROPATHY: ICD-10-CM

## 2023-03-28 DIAGNOSIS — L89.152 SACRAL DECUBITUS ULCER, STAGE II: ICD-10-CM

## 2023-03-28 DIAGNOSIS — I50.32 CHRONIC DIASTOLIC CONGESTIVE HEART FAILURE: Primary | ICD-10-CM

## 2023-03-28 DIAGNOSIS — E78.00 PURE HYPERCHOLESTEROLEMIA: ICD-10-CM

## 2023-03-28 DIAGNOSIS — J96.11 CHRONIC HYPOXEMIC RESPIRATORY FAILURE: ICD-10-CM

## 2023-03-28 DIAGNOSIS — E78.00 HYPERCHOLESTEROLEMIA: ICD-10-CM

## 2023-03-28 PROCEDURE — 99214 PR OFFICE/OUTPT VISIT, EST, LEVL IV, 30-39 MIN: ICD-10-PCS | Mod: S$GLB,,, | Performed by: FAMILY MEDICINE

## 2023-03-28 PROCEDURE — 99214 OFFICE O/P EST MOD 30 MIN: CPT | Mod: S$GLB,,, | Performed by: FAMILY MEDICINE

## 2023-03-28 RX ORDER — MUPIROCIN 20 MG/G
OINTMENT TOPICAL 2 TIMES DAILY
Qty: 30 G | Refills: 3 | Status: SHIPPED | OUTPATIENT
Start: 2023-03-28

## 2023-03-28 NOTE — TELEPHONE ENCOUNTER
----- Message from Liz Wiley MA sent at 3/28/2023  3:26 PM CDT -----  [phone] Dr. Dwyer wants to see the patient back in  3 months  however ,the patient's daughter needs a Tuesday date for a morning appointment  please contact patient to confirm good appointment date .

## 2023-03-28 NOTE — PROGRESS NOTES
SUBJECTIVE:    Patient ID: Marisol Kerr is a 77 y.o. female.    Chief Complaint: Foot Pain (Right foot swelling and plantar pain, brought medications list, Foot exam ordered, abc )    Marisol Kerr is a 77 y.o. female.     Chief Complaint: Foot Pain (Right foot swelling and plantar pain, brought medications list, Foot exam ordered, abc )     76 yo F with pmhx of CAD, NSTEMI, CHF, COPD, cancer, DM, HTN presents to the clinic today for f/u after d/c from [facility]. Pt originally presented to the ED 1/27/23 with acute onset SOB and dyspnea. She was admitted with Acute on chronic respiratory failure due to Pneumonia/CHF flare. Management included BIPAP, diuretics and abx. Pt also had ISSA on CKD in the hospital as well as anemia managed with RBC transfusions. EGD while inpatient on 1/29 showed Non-bleeding duodenal diverticulum and Chronic erosive gastritis with hemorrhage.      Pt spent a couple weeks on the acute care unit at the LT. She had physical therapy and her strength was improving which resulted in her transfer to the normal floor of the LTAC. She was able to walk the length on a hallway, about 400 ft. Prior to discharge.      She currently is feeling her baseline. Breathing is good on 2L O2 on nasal cannula. Spo2 is 96%.  She is seeing home health currently: nurses x3 per week, PT/OT x2 per week, and wound care every other week. She c/o pain at the site of her sacral wound as well as on her L medial ankle wound. These are both tender with some warmth on palpation. Pt appetite is diminished and she is supplementing her meals with 1-2 ensure shakes per day. She denies any new CP/N/V/D, F/C.      Review of Systems   Constitutional:  Negative for appetite change, chills, fatigue, fever and unexpected weight change.   HENT:  Negative for nasal congestion, ear pain, sore throat and trouble swallowing.    Eyes:  Negative for pain, discharge and visual disturbance.   Respiratory:  Positive for shortness of breath (on  oxygen). Negative for apnea, cough and wheezing.    Cardiovascular:  Negative for chest pain, palpitations and leg swelling.   Gastrointestinal:  Negative for abdominal pain, blood in stool, constipation, diarrhea, nausea and vomiting.   Endocrine: Negative for heat intolerance, polydipsia and polyuria.   Genitourinary:  Negative for difficulty urinating, dyspareunia, dysuria, frequency, hematuria and menstrual problem.   Musculoskeletal:  Negative for arthralgias, back pain, gait problem, joint swelling and myalgias.   Integumentary:  Positive for wound.   Allergic/Immunologic: Negative for environmental allergies, food allergies and immunocompromised state.   Neurological:  Negative for dizziness, tremors, seizures, numbness and headaches.   Psychiatric/Behavioral:  Negative for behavioral problems, confusion, hallucinations and suicidal ideas. The patient is not nervous/anxious.       Objective      Physical Exam  Vitals and nursing note reviewed.   Constitutional:       Appearance: She is well-developed.   HENT:      Head: Normocephalic and atraumatic.      Right Ear: External ear normal.      Left Ear: External ear normal.      Nose: Nose normal.   Eyes:      Pupils: Pupils are equal, round, and reactive to light.   Neck:      Thyroid: No thyromegaly.      Vascular: No carotid bruit.   Cardiovascular:      Rate and Rhythm: Normal rate and regular rhythm.      Heart sounds: Normal heart sounds. No murmur heard.  Pulmonary:      Effort: Pulmonary effort is normal.      Breath sounds: Normal breath sounds. No wheezing or rales.      Comments: On oxygen 2L via NC     Abdominal:      General: Bowel sounds are normal. There is no distension.      Palpations: Abdomen is soft.      Tenderness: There is no abdominal tenderness.   Musculoskeletal:         General: No tenderness or deformity. Normal range of motion.      Cervical back: Normal range of motion and neck supple.      Lumbar back: Normal. No spasms.       Comments: Bends 90 degrees at  waist   Feet:      Right foot:      Protective Sensation: 5 sites tested.  4 sites sensed.      Left foot:      Protective Sensation: 5 sites tested.  4 sites sensed.      Skin integrity: Ulcer (stage 2, 3 cm in diameter), erythema and warmth present.   Lymphadenopathy:      Cervical: No cervical adenopathy.   Skin:     General: Skin is warm and dry.      Findings: No rash.           Neurological:      Mental Status: She is alert and oriented to person, place, and time.      Cranial Nerves: No cranial nerve deficit.      Coordination: Coordination normal.   Psychiatric:         Behavior: Behavior normal.         Thought Content: Thought content normal.         Judgment: Judgment normal.                        No results displayed because visit has over 200 results.      No results displayed because visit has over 200 results.      Admission on 01/09/2023, Discharged on 01/10/2023   Component Date Value Ref Range Status    Lipase 01/09/2023 41  4 - 60 U/L Final    Benzodiazepines 01/09/2023 Presumptive Positive (A)  Negative Final    Cocaine (Metab.) 01/09/2023 Negative  Negative Final    Opiate Scrn, Ur 01/09/2023 Negative  Negative Final    Barbiturate Screen, Ur 01/09/2023 Negative  Negative Final    Amphetamine Screen, Ur 01/09/2023 Negative  Negative Final    THC 01/09/2023 Negative  Negative Final    Phencyclidine 01/09/2023 Negative  Negative Final    Creatinine, Urine 01/09/2023 37.0  15.0 - 325.0 mg/dL Final    Toxicology Information 01/09/2023 SEE COMMENT   Final    Specimen UA 01/09/2023 Urine, Clean Catch   Final    Color, UA 01/09/2023 Yellow  Yellow, Straw, [place] Final    Appearance, UA 01/09/2023 Hazy (A)  Clear Final    pH, UA 01/09/2023 5.0  5.0 - 8.0 Final    Specific Gravity, UA 01/09/2023 1.010  1.005 - 1.030 Final    Protein, UA 01/09/2023 1+ (A)  Negative Final    Glucose, UA 01/09/2023 Negative  Negative Final    Ketones, UA 01/09/2023 Negative  Negative Final     Bilirubin (UA) 01/09/2023 Negative  Negative Final    Occult Blood UA 01/09/2023 Trace (A)  Negative Final    Nitrite, UA 01/09/2023 Negative  Negative Final    Urobilinogen, UA 01/09/2023 Negative  Negative EU/dL Final    Leukocytes, UA 01/09/2023 Negative  Negative Final    CPK 01/09/2023 103  20 - 180 U/L Final    Magnesium 01/09/2023 1.9  1.6 - 2.6 mg/dL Final    TSH 01/09/2023 0.270 (L)  0.340 - 5.600 uIU/mL Final    WBC 01/09/2023 11.52  3.90 - 12.70 K/uL Final    RBC 01/09/2023 4.00  4.00 - 5.40 M/uL Final    Hemoglobin 01/09/2023 11.7 (L)  12.0 - 16.0 g/dL Final    Hematocrit 01/09/2023 38.3  37.0 - 48.5 % Final    MCV 01/09/2023 96  82 - 98 fL Final    MCH 01/09/2023 29.3  27.0 - 31.0 pg Final    MCHC 01/09/2023 30.5 (L)  32.0 - 36.0 g/dL Final    RDW 01/09/2023 12.7  11.5 - 14.5 % Final    Platelets 01/09/2023 226  150 - 450 K/uL Final    MPV 01/09/2023 11.0  9.2 - 12.9 fL Final    Immature Granulocytes 01/09/2023 1.4 (H)  0.0 - 0.5 % Final    Gran # (ANC) 01/09/2023 9.4 (H)  1.8 - 7.7 K/uL Final    Immature Grans (Abs) 01/09/2023 0.16 (H)  0.00 - 0.04 K/uL Final    Lymph # 01/09/2023 1.0  1.0 - 4.8 K/uL Final    Mono # 01/09/2023 0.8  0.3 - 1.0 K/uL Final    Eos # 01/09/2023 0.1  0.0 - 0.5 K/uL Final    Baso # 01/09/2023 0.07  0.00 - 0.20 K/uL Final    nRBC 01/09/2023 0  0 /100 WBC Final    Gran % 01/09/2023 81.8 (H)  38.0 - 73.0 % Final    Lymph % 01/09/2023 8.7 (L)  18.0 - 48.0 % Final    Mono % 01/09/2023 6.7  4.0 - 15.0 % Final    Eosinophil % 01/09/2023 0.8  0.0 - 8.0 % Final    Basophil % 01/09/2023 0.6  0.0 - 1.9 % Final    Differential Method 01/09/2023 Automated   Final    Sodium 01/09/2023 139  136 - 145 mmol/L Final    Potassium 01/09/2023 4.1  3.5 - 5.1 mmol/L Final    Chloride 01/09/2023 102  95 - 110 mmol/L Final    CO2 01/09/2023 28  23 - 29 mmol/L Final    Glucose 01/09/2023 122 (H)  70 - 110 mg/dL Final    BUN 01/09/2023 27 (H)  8 - 23 mg/dL Final    Creatinine 01/09/2023 1.4  0.5 - 1.4  mg/dL Final    Calcium 01/09/2023 8.9  8.7 - 10.5 mg/dL Final    Total Protein 01/09/2023 6.9  6.0 - 8.4 g/dL Final    Albumin 01/09/2023 3.1 (L)  3.5 - 5.2 g/dL Final    Total Bilirubin 01/09/2023 0.4  0.1 - 1.0 mg/dL Final    Alkaline Phosphatase 01/09/2023 75  55 - 135 U/L Final    AST 01/09/2023 20  10 - 40 U/L Final    ALT 01/09/2023 14  10 - 44 U/L Final    Anion Gap 01/09/2023 9  8 - 16 mmol/L Final    eGFR 01/09/2023 39.2 (A)  >60 mL/min/1.73 m^2 Final    Troponin I High Sensitivity 01/09/2023 23.0 (H)  0.0 - 14.9 pg/mL Final    Troponin I High Sensitivity 01/09/2023 23.3 (H)  0.0 - 14.9 pg/mL Final    BNP 01/09/2023 232 (H)  0 - 99 pg/mL Final    SARS-CoV-2 RNA, Amplification, Qual 01/09/2023 Negative  Negative Final    Influenza A, Molecular 01/09/2023 Negative  Negative Final    Influenza B, Molecular 01/09/2023 Negative  Negative Final    Flu A & B Source 01/09/2023 Nasal swab   Final    Free T4 01/09/2023 0.97  0.71 - 1.51 ng/dL Final    RBC, UA 01/09/2023 4  0 - 4 /hpf Final    WBC, UA 01/09/2023 4  0 - 5 /hpf Final    Bacteria 01/09/2023 Occasional  None-Occ /hpf Final    Squam Epithel, UA 01/09/2023 3  /hpf Final    Hyaline Casts, UA 01/09/2023 5 (A)  0-1/lpf /lpf Final    Microscopic Comment 01/09/2023 SEE COMMENT   Final    Procalcitonin 01/09/2023 0.25  0.00 - 0.50 ng/mL Final    Sodium 01/10/2023 142  136 - 145 mmol/L Final    Potassium 01/10/2023 4.5  3.5 - 5.1 mmol/L Final    Chloride 01/10/2023 100  95 - 110 mmol/L Final    CO2 01/10/2023 33 (H)  23 - 29 mmol/L Final    Glucose 01/10/2023 180 (H)  70 - 110 mg/dL Final    BUN 01/10/2023 26 (H)  8 - 23 mg/dL Final    Creatinine 01/10/2023 1.4  0.5 - 1.4 mg/dL Final    Calcium 01/10/2023 9.5  8.7 - 10.5 mg/dL Final    Total Protein 01/10/2023 7.1  6.0 - 8.4 g/dL Final    Albumin 01/10/2023 3.1 (L)  3.5 - 5.2 g/dL Final    Total Bilirubin 01/10/2023 0.5  0.1 - 1.0 mg/dL Final    Alkaline Phosphatase 01/10/2023 81  55 - 135 U/L Final    AST  01/10/2023 22  10 - 40 U/L Final    ALT 01/10/2023 16  10 - 44 U/L Final    Anion Gap 01/10/2023 9  8 - 16 mmol/L Final    eGFR 01/10/2023 39.2 (A)  >60 mL/min/1.73 m^2 Final    Magnesium 01/10/2023 1.9  1.6 - 2.6 mg/dL Final    WBC 01/10/2023 8.25  3.90 - 12.70 K/uL Final    RBC 01/10/2023 4.05  4.00 - 5.40 M/uL Final    Hemoglobin 01/10/2023 12.0  12.0 - 16.0 g/dL Final    Hematocrit 01/10/2023 38.8  37.0 - 48.5 % Final    MCV 01/10/2023 96  82 - 98 fL Final    MCH 01/10/2023 29.6  27.0 - 31.0 pg Final    MCHC 01/10/2023 30.9 (L)  32.0 - 36.0 g/dL Final    RDW 01/10/2023 12.4  11.5 - 14.5 % Final    Platelets 01/10/2023 210  150 - 450 K/uL Final    MPV 01/10/2023 11.3  9.2 - 12.9 fL Final    Immature Granulocytes 01/10/2023 4.0 (H)  0.0 - 0.5 % Final    Gran # (ANC) 01/10/2023 7.1  1.8 - 7.7 K/uL Final    Immature Grans (Abs) 01/10/2023 0.33 (H)  0.00 - 0.04 K/uL Final    Lymph # 01/10/2023 0.7 (L)  1.0 - 4.8 K/uL Final    Mono # 01/10/2023 0.1 (L)  0.3 - 1.0 K/uL Final    Eos # 01/10/2023 0.0  0.0 - 0.5 K/uL Final    Baso # 01/10/2023 0.02  0.00 - 0.20 K/uL Final    nRBC 01/10/2023 0  0 /100 WBC Final    Gran % 01/10/2023 86.6 (H)  38.0 - 73.0 % Final    Lymph % 01/10/2023 8.5 (L)  18.0 - 48.0 % Final    Mono % 01/10/2023 0.7 (L)  4.0 - 15.0 % Final    Eosinophil % 01/10/2023 0.0  0.0 - 8.0 % Final    Basophil % 01/10/2023 0.2  0.0 - 1.9 % Final    Platelet Estimate 01/10/2023 Clumped (A)   Final    Differential Method 01/10/2023 Automated   Final   Lab Visit on 01/06/2023   Component Date Value Ref Range Status    WBC 01/06/2023 11.92  3.90 - 12.70 K/uL Final    RBC 01/06/2023 4.07  4.00 - 5.40 M/uL Final    Hemoglobin 01/06/2023 12.0  12.0 - 16.0 g/dL Final    Hematocrit 01/06/2023 40.2  37.0 - 48.5 % Final    MCV 01/06/2023 99 (H)  82 - 98 fL Final    MCH 01/06/2023 29.5  27.0 - 31.0 pg Final    MCHC 01/06/2023 29.9 (L)  32.0 - 36.0 g/dL Final    RDW 01/06/2023 13.1  11.5 - 14.5 % Final    Platelets 01/06/2023  206  150 - 450 K/uL Final    MPV 01/06/2023 12.1  9.2 - 12.9 fL Final    Immature Granulocytes 01/06/2023 0.3  0.0 - 0.5 % Final    Gran # (ANC) 01/06/2023 10.1 (H)  1.8 - 7.7 K/uL Final    Immature Grans (Abs) 01/06/2023 0.04  0.00 - 0.04 K/uL Final    Lymph # 01/06/2023 0.8 (L)  1.0 - 4.8 K/uL Final    Mono # 01/06/2023 0.9  0.3 - 1.0 K/uL Final    Eos # 01/06/2023 0.1  0.0 - 0.5 K/uL Final    Baso # 01/06/2023 0.04  0.00 - 0.20 K/uL Final    nRBC 01/06/2023 0  0 /100 WBC Final    Gran % 01/06/2023 84.8 (H)  38.0 - 73.0 % Final    Lymph % 01/06/2023 6.3 (L)  18.0 - 48.0 % Final    Mono % 01/06/2023 7.5  4.0 - 15.0 % Final    Eosinophil % 01/06/2023 0.8  0.0 - 8.0 % Final    Basophil % 01/06/2023 0.3  0.0 - 1.9 % Final    Differential Method 01/06/2023 Automated   Final    Sodium 01/06/2023 138  136 - 145 mmol/L Final    Potassium 01/06/2023 3.9  3.5 - 5.1 mmol/L Final    Chloride 01/06/2023 97  95 - 110 mmol/L Final    CO2 01/06/2023 31 (H)  23 - 29 mmol/L Final    Glucose 01/06/2023 117 (H)  70 - 110 mg/dL Final    BUN 01/06/2023 32 (H)  8 - 23 mg/dL Final    Creatinine 01/06/2023 1.6 (H)  0.5 - 1.4 mg/dL Final    Calcium 01/06/2023 9.5  8.7 - 10.5 mg/dL Final    Total Protein 01/06/2023 7.0  6.0 - 8.4 g/dL Final    Albumin 01/06/2023 3.5  3.5 - 5.2 g/dL Final    Total Bilirubin 01/06/2023 0.6  0.1 - 1.0 mg/dL Final    Alkaline Phosphatase 01/06/2023 94  55 - 135 U/L Final    AST 01/06/2023 14  10 - 40 U/L Final    ALT 01/06/2023 7 (L)  10 - 44 U/L Final    Anion Gap 01/06/2023 10  8 - 16 mmol/L Final    eGFR 01/06/2023 33.4 (A)  >60 mL/min/1.73 m^2 Final    CRP 01/06/2023 135.5 (H)  0.0 - 8.2 mg/L Final    Sed Rate 01/06/2023 52 (H)  0 - 20 mm/Hr Final    Hemoglobin A1C 01/06/2023 4.8  4.0 - 5.6 % Final    Estimated Avg Glucose 01/06/2023 91  68 - 131 mg/dL Final    Prealbumin 01/06/2023 17 (L)  20 - 43 mg/dL Final   Lab Visit on 11/16/2022   Component Date Value Ref Range Status    Sodium 11/16/2022 141  136 -  145 mmol/L Final    Potassium 11/16/2022 5.2 (H)  3.5 - 5.1 mmol/L Final    Chloride 11/16/2022 103  95 - 110 mmol/L Final    CO2 11/16/2022 28  23 - 29 mmol/L Final    Glucose 11/16/2022 114 (H)  70 - 110 mg/dL Final    BUN 11/16/2022 29 (H)  8 - 23 mg/dL Final    Creatinine 11/16/2022 1.8 (H)  0.5 - 1.4 mg/dL Final    Calcium 11/16/2022 9.5  8.7 - 10.5 mg/dL Final    Total Protein 11/16/2022 6.8  6.0 - 8.4 g/dL Final    Albumin 11/16/2022 3.3 (L)  3.5 - 5.2 g/dL Final    Total Bilirubin 11/16/2022 0.3  0.1 - 1.0 mg/dL Final    Alkaline Phosphatase 11/16/2022 109  55 - 135 U/L Final    AST 11/16/2022 21  10 - 40 U/L Final    ALT 11/16/2022 20  10 - 44 U/L Final    Anion Gap 11/16/2022 10  8 - 16 mmol/L Final    eGFR 11/16/2022 29.0 (A)  >60 mL/min/1.73 m^2 Final   Lab Visit on 11/02/2022   Component Date Value Ref Range Status    WBC 11/02/2022 7.90  3.90 - 12.70 K/uL Final    RBC 11/02/2022 3.89 (L)  4.00 - 5.40 M/uL Final    Hemoglobin 11/02/2022 11.5 (L)  12.0 - 16.0 g/dL Final    Hematocrit 11/02/2022 38.1  37.0 - 48.5 % Final    MCV 11/02/2022 98  82 - 98 fL Final    MCH 11/02/2022 29.6  27.0 - 31.0 pg Final    MCHC 11/02/2022 30.2 (L)  32.0 - 36.0 g/dL Final    RDW 11/02/2022 13.4  11.5 - 14.5 % Final    Platelets 11/02/2022 249  150 - 450 K/uL Final    MPV 11/02/2022 11.6  9.2 - 12.9 fL Final    Immature Granulocytes 11/02/2022 0.5  0.0 - 0.5 % Final    Gran # (ANC) 11/02/2022 5.6  1.8 - 7.7 K/uL Final    Immature Grans (Abs) 11/02/2022 0.04  0.00 - 0.04 K/uL Final    Lymph # 11/02/2022 1.4  1.0 - 4.8 K/uL Final    Mono # 11/02/2022 0.7  0.3 - 1.0 K/uL Final    Eos # 11/02/2022 0.1  0.0 - 0.5 K/uL Final    Baso # 11/02/2022 0.05  0.00 - 0.20 K/uL Final    nRBC 11/02/2022 0  0 /100 WBC Final    Gran % 11/02/2022 70.9  38.0 - 73.0 % Final    Lymph % 11/02/2022 18.1  18.0 - 48.0 % Final    Mono % 11/02/2022 8.5  4.0 - 15.0 % Final    Eosinophil % 11/02/2022 1.4  0.0 - 8.0 % Final    Basophil % 11/02/2022 0.6   0.0 - 1.9 % Final    Differential Method 11/02/2022 Automated   Final    Sodium 11/02/2022 143  136 - 145 mmol/L Final    Potassium 11/02/2022 5.3 (H)  3.5 - 5.1 mmol/L Final    Chloride 11/02/2022 104  95 - 110 mmol/L Final    CO2 11/02/2022 28  23 - 29 mmol/L Final    Glucose 11/02/2022 111 (H)  70 - 110 mg/dL Final    BUN 11/02/2022 25 (H)  8 - 23 mg/dL Final    Creatinine 11/02/2022 1.8 (H)  0.5 - 1.4 mg/dL Final    Calcium 11/02/2022 9.5  8.7 - 10.5 mg/dL Final    Total Protein 11/02/2022 6.9  6.0 - 8.4 g/dL Final    Albumin 11/02/2022 3.1 (L)  3.5 - 5.2 g/dL Final    Total Bilirubin 11/02/2022 0.4  0.1 - 1.0 mg/dL Final    Alkaline Phosphatase 11/02/2022 122  55 - 135 U/L Final    AST 11/02/2022 26  10 - 40 U/L Final    ALT 11/02/2022 62 (H)  10 - 44 U/L Final    Anion Gap 11/02/2022 11  8 - 16 mmol/L Final    eGFR 11/02/2022 29 (A)  >60 mL/min/1.73 m^2 Final    CRP 11/02/2022 7.0  0.0 - 8.2 mg/L Final    Hemoglobin A1C 11/02/2022 4.9  4.0 - 5.6 % Final    Estimated Avg Glucose 11/02/2022 94  68 - 131 mg/dL Final    Prealbumin 11/02/2022 21  20 - 43 mg/dL Final    Sed Rate 11/02/2022 31 (H)  0 - 20 mm/Hr Final   Lab Visit on 10/26/2022   Component Date Value Ref Range Status    WBC 10/26/2022 9.50  3.90 - 12.70 K/uL Final    RBC 10/26/2022 3.83 (L)  4.00 - 5.40 M/uL Final    Hemoglobin 10/26/2022 11.3 (L)  12.0 - 16.0 g/dL Final    Hematocrit 10/26/2022 37.3  37.0 - 48.5 % Final    MCV 10/26/2022 97  82 - 98 fL Final    MCH 10/26/2022 29.5  27.0 - 31.0 pg Final    MCHC 10/26/2022 30.3 (L)  32.0 - 36.0 g/dL Final    RDW 10/26/2022 13.9  11.5 - 14.5 % Final    Platelets 10/26/2022 156  150 - 450 K/uL Final    MPV 10/26/2022 12.1  9.2 - 12.9 fL Final    Immature Granulocytes 10/26/2022 0.3  0.0 - 0.5 % Final    Gran # (ANC) 10/26/2022 8.3 (H)  1.8 - 7.7 K/uL Final    Immature Grans (Abs) 10/26/2022 0.03  0.00 - 0.04 K/uL Final    Lymph # 10/26/2022 0.6 (L)  1.0 - 4.8 K/uL Final    Mono # 10/26/2022 0.5  0.3 - 1.0  K/uL Final    Eos # 10/26/2022 0.1  0.0 - 0.5 K/uL Final    Baso # 10/26/2022 0.03  0.00 - 0.20 K/uL Final    nRBC 10/26/2022 0  0 /100 WBC Final    Gran % 10/26/2022 87.0 (H)  38.0 - 73.0 % Final    Lymph % 10/26/2022 6.6 (L)  18.0 - 48.0 % Final    Mono % 10/26/2022 4.7  4.0 - 15.0 % Final    Eosinophil % 10/26/2022 1.1  0.0 - 8.0 % Final    Basophil % 10/26/2022 0.3  0.0 - 1.9 % Final    Differential Method 10/26/2022 Automated   Final    Sodium 10/26/2022 142  136 - 145 mmol/L Final    Potassium 10/26/2022 4.2  3.5 - 5.1 mmol/L Final    Chloride 10/26/2022 100  95 - 110 mmol/L Final    CO2 10/26/2022 29  23 - 29 mmol/L Final    Glucose 10/26/2022 108  70 - 110 mg/dL Final    BUN 10/26/2022 29 (H)  8 - 23 mg/dL Final    Creatinine 10/26/2022 1.7 (H)  0.5 - 1.4 mg/dL Final    Calcium 10/26/2022 9.3  8.7 - 10.5 mg/dL Final    Total Protein 10/26/2022 6.4  6.0 - 8.4 g/dL Final    Albumin 10/26/2022 3.1 (L)  3.5 - 5.2 g/dL Final    Total Bilirubin 10/26/2022 0.5  0.1 - 1.0 mg/dL Final    Alkaline Phosphatase 10/26/2022 188 (H)  55 - 135 U/L Final    AST 10/26/2022 444 (H)  10 - 40 U/L Final    ALT 10/26/2022 669 (H)  10 - 44 U/L Final    Anion Gap 10/26/2022 13  8 - 16 mmol/L Final    eGFR 10/26/2022 31 (A)  >60 mL/min/1.73 m^2 Final    CRP 10/26/2022 132.4 (H)  0.0 - 8.2 mg/L Final    Sed Rate 10/26/2022 45 (H)  0 - 20 mm/Hr Final    Hemoglobin A1C 10/26/2022 5.1  4.0 - 5.6 % Final    Estimated Avg Glucose 10/26/2022 100  68 - 131 mg/dL Final    Prealbumin 10/26/2022 22  20 - 43 mg/dL Final       Past Medical History:   Diagnosis Date    CAD (coronary artery disease)     Cancer     colon    CHF (congestive heart failure)     Colitis     Colon cancer     COPD (chronic obstructive pulmonary disease)     Decreased hearing     Diabetes mellitus     Diabetes mellitus, type 2     Hypercholesterolemia     Hypertension     Hypoxemia     Insomnia     Obesity     Osteoarthritis      Social History     Socioeconomic History     Marital status:    Tobacco Use    Smoking status: Former     Packs/day: 3.00     Years: 50.00     Pack years: 150.00     Types: Cigarettes     Start date: 3/30/1964     Quit date: 2006     Years since quittin.0    Smokeless tobacco: Never    Tobacco comments:     ex smoker 15 years   Substance and Sexual Activity    Alcohol use: Yes    Drug use: No    Sexual activity: Never     Social Determinants of Health     Financial Resource Strain: Low Risk     Difficulty of Paying Living Expenses: Not hard at all   Food Insecurity: No Food Insecurity    Worried About Running Out of Food in the Last Year: Never true    Ran Out of Food in the Last Year: Never true   Transportation Needs: No Transportation Needs    Lack of Transportation (Medical): No    Lack of Transportation (Non-Medical): No   Physical Activity: Inactive    Days of Exercise per Week: 0 days    Minutes of Exercise per Session: 0 min   Stress: Stress Concern Present    Feeling of Stress : To some extent   Social Connections: Socially Isolated    Frequency of Communication with Friends and Family: More than three times a week    Frequency of Social Gatherings with Friends and Family: More than three times a week    Attends Bahai Services: Never    Active Member of Clubs or Organizations: No    Attends Club or Organization Meetings: Never    Marital Status:    Housing Stability: Low Risk     Unable to Pay for Housing in the Last Year: No    Number of Places Lived in the Last Year: 1    Unstable Housing in the Last Year: No     Past Surgical History:   Procedure Laterality Date    ANGIOGRAM, CORONARY, WITH LEFT HEART CATHETERIZATION N/A 2/10/2022    Procedure: Angiogram, Coronary, with Left Heart Cath;  Surgeon: Cristian Benjamin MD;  Location: [facility] CATH/EP LAB;  Service: Cardiology;  Laterality: N/A;    CARDIAC CATHETERIZATION      CHOLECYSTECTOMY      COLECTOMY      CORONARY ANGIOPLASTY      CORONARY STENT PLACEMENT      ERCP N/A  1/16/2023    Procedure: ERCP (ENDOSCOPIC RETROGRADE CHOLANGIOPANCREATOGRAPHY);  Surgeon: Biju Kramer III, MD;  Location: [facility] ENDO;  Service: Endoscopy;  Laterality: N/A;    ESOPHAGOGASTRODUODENOSCOPY N/A 1/29/2023    Procedure: EGD (ESOPHAGOGASTRODUODENOSCOPY);  Surgeon: Aarti Alvarez MD;  Location: [facility] ENDO;  Service: Endoscopy;  Laterality: N/A;    EXTERNAL EAR SURGERY      EYE SURGERY      FLEXIBLE SIGMOIDOSCOPY N/A 9/19/2019    Procedure: SIGMOIDOSCOPY, FLEXIBLE;  Surgeon: Biju Kramer III, MD;  Location: [facility] ENDO;  Service: Endoscopy;  Laterality: N/A;    HYSTERECTOMY      partial     Family History   Problem Relation Age of Onset    Febrile seizures Mother     Heart failure Father        Review of patient's allergies indicates:   Allergen Reactions    Iodinated contrast media     Strawberries [strawberry] Hives and Itching       Current Outpatient Medications:     albuterol (VENTOLIN HFA) 90 mcg/actuation inhaler, Inhale 2 puffs into the lungs every 6 (six) hours as needed for Wheezing or Shortness of Breath. Rescue, Disp: 36 g, Rfl: 3    albuterol-ipratropium (DUO-NEB) 2.5 mg-0.5 mg/3 mL nebulizer solution, Take 3 mLs by nebulization every 4 (four) hours as needed for Wheezing or Shortness of Breath. Rescue, Disp: 120 each, Rfl: 6    allopurinoL (ZYLOPRIM) 100 MG tablet, Take 0.5 tablets (50 mg total) by mouth once daily., Disp: 45 tablet, Rfl: 1    aspirin (ECOTRIN) 81 MG EC tablet, Take 1 tablet (81 mg total) by mouth every morning., Disp: 90 tablet, Rfl: 3    cholecalciferol, vitamin D3, 125 mcg (5,000 unit) Tab, Take 5,000 Units by mouth once daily., Disp: , Rfl:     cholestyramine (QUESTRAN) 4 gram packet, Take 1 packet (4 g total) by mouth 2 (two) times daily., Disp: 180 packet, Rfl: 3    coenzyme Q10 100 mg capsule, Take 100 mg by mouth every morning., Disp: , Rfl:     diltiaZEM (CARDIZEM CD) 120 MG Cp24, Take 1 capsule (120 mg total) by mouth once daily., Disp: 90 capsule, Rfl:  1    ergocalciferol (VITAMIN D2) 50,000 unit Cap, Take 1 capsule (50,000 Units total) by mouth every 7 days., Disp: 12 capsule, Rfl: 1    ferrous sulfate 325 (65 FE) MG EC tablet, Take 325 mg by mouth once daily., Disp: , Rfl:     fish oil-omega-3 fatty acids 300-1,000 mg capsule, Take 2 capsules by mouth every morning., Disp: , Rfl:     ipratropium-albuteroL (COMBIVENT RESPIMAT)  mcg/actuation inhaler, Inhale 2 puffs into the lungs every 4 (four) hours as needed for Shortness of Breath. Rescue, Disp: 12 g, Rfl: 3    nebulizer and compressor Reshma, as directed, Disp: 1 each, Rfl: 0    nitroGLYCERIN 0.4 MG/DOSE TL SPRY (NITROLINGUAL) 400 mcg/spray spray, Place 1 spray under the tongue every 5 (five) minutes as needed for Chest pain (max of 3 doses)., Disp: 4.9 g, Rfl: 1    pantoprazole (PROTONIX) 40 MG tablet, Take 1 tablet (40 mg total) by mouth once daily., Disp: 90 tablet, Rfl: 1    polycarbophil (FIBERCON) 625 mg tablet, Take 625 mg by mouth once daily., Disp: , Rfl:     ranolazine (RANEXA) 500 MG Tb12, Take 1 tablet (500 mg total) by mouth 2 (two) times daily., Disp: 180 tablet, Rfl: 1    umeclidinium (INCRUSE ELLIPTA) 62.5 mcg/actuation inhalation capsule, Inhale 62.5 mcg into the lungs every morning. Controller, Disp: 30 each, Rfl: 11    vit C/E/Zn/coppr/lutein/zeaxan (PRESERVISION AREDS-2 ORAL), Take 1 tablet by mouth once daily., Disp: , Rfl:     atorvastatin (LIPITOR) 40 MG tablet, Take 1 tablet (40 mg total) by mouth once daily., Disp: 30 tablet, Rfl: 0    furosemide (LASIX) 40 MG tablet, Take 1 tablet (40 mg total) by mouth once daily., Disp: 30 tablet, Rfl: 0    hydrALAZINE (APRESOLINE) 50 MG tablet, Take 1 tablet (50 mg total) by mouth every 12 (twelve) hours., Disp: 60 tablet, Rfl: 0    isosorbide mononitrate (IMDUR) 120 MG 24 hr tablet, Take 1 tablet (120 mg total) by mouth once daily., Disp: 90 tablet, Rfl: 1    metoprolol tartrate (LOPRESSOR) 50 MG tablet, Take 1 tablet (50 mg total) by mouth 3  "(three) times daily., Disp: 90 tablet, Rfl: 0    mupirocin (BACTROBAN) 2 % ointment, Apply topically 2 (two) times daily., Disp: 30 g, Rfl: 3    ticagrelor (BRILINTA) 90 mg tablet, Take 1 tablet (90 mg total) by mouth every 12 (twelve) hours., Disp: 60 tablet, Rfl: 0    Review of Systems        Objective:      Vitals:    03/28/23 1421   BP: 130/66   Pulse: 60   Weight: 73 kg (161 lb)   Height: 5' 2" (1.575 m)     Physical Exam      Assessment:       1. Chronic diastolic congestive heart failure    2. DM type 2, controlled, with complication    3. Sacral decubitus ulcer, stage II    4. Diabetic peripheral neuropathy    5. Hearing loss, unspecified hearing loss type, unspecified laterality    6. Chronic hypoxemic respiratory failure    7. Chronic respiratory failure with hypoxia    8. COPD exacerbation    9. Coronary artery disease involving native coronary artery of native heart with unstable angina pectoris    10. Essential hypertension, benign    11. Hypercholesterolemia    12. Primary hypertension    13. Pure hypercholesterolemia    14. Venous stasis    15. Chronic kidney disease, stage 4 (severe)    16. History of colon cancer    17. Gastroesophageal reflux disease without esophagitis    18. Primary insomnia    19. Impaired functional mobility and endurance           Plan:       Chronic diastolic congestive heart failure  Stable at this time using 2 L nasal cannula oxygen.  DM type 2, controlled, with complication  -     Foot Exam Performed  Foot exam was normal to monofilament  Sacral decubitus ulcer, stage II  -     mupirocin (BACTROBAN) 2 % ointment; Apply topically 2 (two) times daily.  Dispense: 30 g; Refill: 3  Add Bactroban to heel lesion and the sacral lesion  Diabetic peripheral neuropathy    Hearing loss, unspecified hearing loss type, unspecified laterality    Chronic hypoxemic respiratory failure  Continue 2 L nasal cannula oxygen  Chronic respiratory failure with hypoxia  Nebulizer treatments twice a " day  COPD exacerbation    Coronary artery disease involving native coronary artery of native heart with unstable angina pectoris    Essential hypertension, benign  Blood pressure well controlled  Hypercholesterolemia    Primary hypertension    Pure hypercholesterolemia    Venous stasis  Trace edema to lower extremities  Chronic kidney disease, stage 4 (severe)  GFR down to 12, currently not seeing a renal specialist  History of colon cancer    Gastroesophageal reflux disease without esophagitis    Primary insomnia    Impaired functional mobility and endurance      No follow-ups on file.        3/28/2023 Khadar Dwyer

## 2023-03-28 NOTE — MEDICAL/APP STUDENT
Subjective     Patient ID: Marisol Kerr is a 77 y.o. female.    Chief Complaint: Foot Pain (Right foot swelling and plantar pain, brought medications list, Foot exam ordered, abc )    78 yo F with pmhx of CAD, NSTEMI, CHF, COPD, cancer, DM, HTN presents to the clinic today for f/u after d/c from LTAC. Pt originally presented to the ED 1/27/23 with acute onset SOB and dyspnea. She was admitted with Acute on chronic respiratory failure due to Pneumonia/CHF flare. Management included BIPAP, diuretics and abx. Pt also had ISSA on CKD in the hospital as well as anemia managed with RBC transfusions. EGD while inpatient on 1/29 showed Non-bleeding duodenal diverticulum and Chronic erosive gastritis with hemorrhage.     Pt spent a couple weeks on the acute care unit at the LTAC. She had physical therapy and her strength was improving which resulted in her transfer to the normal floor of the LTAC. She was able to walk the length on a hallway, about 400 ft. Prior to discharge.     She currently is feeling her baseline. Breathing is good on 2L O2 on nasal cannula. Spo2 is 96%.  She is seeing home health currently: nurses x3 per week, PT/OT x2 per week, and wound care every other week. She c/o pain at the site of her sacral wound as well as on her L medial ankle wound. These are both tender with some warmth on palpation. Pt appetite is diminished and she is supplementing her meals with 1-2 ensure shakes per day. She denies any new CP/N/V/D, F/C.     Review of Systems   Constitutional:  Negative for appetite change, chills, fatigue, fever and unexpected weight change.   HENT:  Negative for nasal congestion, ear pain, sore throat and trouble swallowing.    Eyes:  Negative for pain, discharge and visual disturbance.   Respiratory:  Positive for shortness of breath (on oxygen). Negative for apnea, cough and wheezing.    Cardiovascular:  Negative for chest pain, palpitations and leg swelling.   Gastrointestinal:  Negative for  abdominal pain, blood in stool, constipation, diarrhea, nausea and vomiting.   Endocrine: Negative for heat intolerance, polydipsia and polyuria.   Genitourinary:  Negative for difficulty urinating, dyspareunia, dysuria, frequency, hematuria and menstrual problem.   Musculoskeletal:  Negative for arthralgias, back pain, gait problem, joint swelling and myalgias.   Integumentary:  Positive for wound.   Allergic/Immunologic: Negative for environmental allergies, food allergies and immunocompromised state.   Neurological:  Negative for dizziness, tremors, seizures, numbness and headaches.   Psychiatric/Behavioral:  Negative for behavioral problems, confusion, hallucinations and suicidal ideas. The patient is not nervous/anxious.         Objective     Physical Exam  Vitals and nursing note reviewed.   Constitutional:       Appearance: She is well-developed.   HENT:      Head: Normocephalic and atraumatic.      Right Ear: External ear normal.      Left Ear: External ear normal.      Nose: Nose normal.   Eyes:      Pupils: Pupils are equal, round, and reactive to light.   Neck:      Thyroid: No thyromegaly.      Vascular: No carotid bruit.   Cardiovascular:      Rate and Rhythm: Normal rate and regular rhythm.      Heart sounds: Normal heart sounds. No murmur heard.  Pulmonary:      Effort: Pulmonary effort is normal.      Breath sounds: Normal breath sounds. No wheezing or rales.      Comments: On oxygen 2L via NC    Abdominal:      General: Bowel sounds are normal. There is no distension.      Palpations: Abdomen is soft.      Tenderness: There is no abdominal tenderness.   Musculoskeletal:         General: No tenderness or deformity. Normal range of motion.      Cervical back: Normal range of motion and neck supple.      Lumbar back: Normal. No spasms.      Comments: Bends 90 degrees at  waist   Feet:      Right foot:      Protective Sensation: 5 sites tested.  4 sites sensed.      Left foot:      Protective Sensation:  5 sites tested.  4 sites sensed.      Skin integrity: Ulcer (stage 2, 3 cm in diameter), erythema and warmth present.   Lymphadenopathy:      Cervical: No cervical adenopathy.   Skin:     General: Skin is warm and dry.      Findings: No rash.          Neurological:      Mental Status: She is alert and oriented to person, place, and time.      Cranial Nerves: No cranial nerve deficit.      Coordination: Coordination normal.   Psychiatric:         Behavior: Behavior normal.         Thought Content: Thought content normal.         Judgment: Judgment normal.          Assessment and Plan     Problem List Items Addressed This Visit          Endocrine    DM type 2, controlled, with complication - Primary    Relevant Orders    Foot Exam Performed (Completed)     Other Visit Diagnoses       Sacral decubitus ulcer, stage II        Relevant Medications    mupirocin (BACTROBAN) 2 % ointment

## 2023-03-29 ENCOUNTER — TELEPHONE (OUTPATIENT)
Dept: FAMILY MEDICINE | Facility: CLINIC | Age: 78
End: 2023-03-29

## 2023-03-29 NOTE — TELEPHONE ENCOUNTER
Spoke with Ms. Damon daughter to provide nephrology office number to call and scheduled her appointment. Faxed all information nephrology clinic was looking for.

## 2023-04-06 ENCOUNTER — LAB VISIT (OUTPATIENT)
Dept: LAB | Facility: HOSPITAL | Age: 78
End: 2023-04-06
Attending: FAMILY MEDICINE
Payer: MEDICARE

## 2023-04-06 DIAGNOSIS — N18.30 CHRONIC KIDNEY DISEASE, STAGE III (MODERATE): Primary | ICD-10-CM

## 2023-04-06 DIAGNOSIS — D64.9 ANEMIA, UNSPECIFIED: ICD-10-CM

## 2023-04-06 LAB
ALBUMIN SERPL BCP-MCNC: 2.7 G/DL (ref 3.5–5.2)
ALP SERPL-CCNC: 79 U/L (ref 55–135)
ALT SERPL W/O P-5'-P-CCNC: 9 U/L (ref 10–44)
ANION GAP SERPL CALC-SCNC: 8 MMOL/L (ref 8–16)
AST SERPL-CCNC: 13 U/L (ref 10–40)
BASOPHILS # BLD AUTO: 0.06 K/UL (ref 0–0.2)
BASOPHILS NFR BLD: 0.6 % (ref 0–1.9)
BILIRUB SERPL-MCNC: 0.4 MG/DL (ref 0.1–1)
BUN SERPL-MCNC: 29 MG/DL (ref 8–23)
CALCIUM SERPL-MCNC: 9 MG/DL (ref 8.7–10.5)
CHLORIDE SERPL-SCNC: 105 MMOL/L (ref 95–110)
CO2 SERPL-SCNC: 26 MMOL/L (ref 23–29)
CREAT SERPL-MCNC: 1.9 MG/DL (ref 0.5–1.4)
DIFFERENTIAL METHOD: ABNORMAL
EOSINOPHIL # BLD AUTO: 0.1 K/UL (ref 0–0.5)
EOSINOPHIL NFR BLD: 0.8 % (ref 0–8)
ERYTHROCYTE [DISTWIDTH] IN BLOOD BY AUTOMATED COUNT: 14.9 % (ref 11.5–14.5)
EST. GFR  (NO RACE VARIABLE): 27 ML/MIN/1.73 M^2
GLUCOSE SERPL-MCNC: 144 MG/DL (ref 70–110)
HCT VFR BLD AUTO: 37.6 % (ref 37–48.5)
HGB BLD-MCNC: 11 G/DL (ref 12–16)
IMM GRANULOCYTES # BLD AUTO: 0.03 K/UL (ref 0–0.04)
IMM GRANULOCYTES NFR BLD AUTO: 0.3 % (ref 0–0.5)
LYMPHOCYTES # BLD AUTO: 0.8 K/UL (ref 1–4.8)
LYMPHOCYTES NFR BLD: 8.2 % (ref 18–48)
MCH RBC QN AUTO: 27 PG (ref 27–31)
MCHC RBC AUTO-ENTMCNC: 29.3 G/DL (ref 32–36)
MCV RBC AUTO: 92 FL (ref 82–98)
MONOCYTES # BLD AUTO: 0.4 K/UL (ref 0.3–1)
MONOCYTES NFR BLD: 3.5 % (ref 4–15)
NEUTROPHILS # BLD AUTO: 8.9 K/UL (ref 1.8–7.7)
NEUTROPHILS NFR BLD: 86.6 % (ref 38–73)
NRBC BLD-RTO: 0 /100 WBC
PLATELET # BLD AUTO: 251 K/UL (ref 150–450)
PMV BLD AUTO: 12.5 FL (ref 9.2–12.9)
POTASSIUM SERPL-SCNC: 4.7 MMOL/L (ref 3.5–5.1)
PROT SERPL-MCNC: 6.4 G/DL (ref 6–8.4)
RBC # BLD AUTO: 4.07 M/UL (ref 4–5.4)
SODIUM SERPL-SCNC: 139 MMOL/L (ref 136–145)
WBC # BLD AUTO: 10.23 K/UL (ref 3.9–12.7)

## 2023-04-06 PROCEDURE — 36415 COLL VENOUS BLD VENIPUNCTURE: CPT | Performed by: FAMILY MEDICINE

## 2023-04-06 PROCEDURE — 85025 COMPLETE CBC W/AUTO DIFF WBC: CPT | Performed by: FAMILY MEDICINE

## 2023-04-06 PROCEDURE — 80053 COMPREHEN METABOLIC PANEL: CPT | Performed by: FAMILY MEDICINE

## 2023-04-06 NOTE — TELEPHONE ENCOUNTER
----- Message from Vera Edmondson MA sent at 4/6/2023 11:53 AM CDT -----  Regarding: refill  Needs refill on brilinta 90 mg bid,  [facility] [place]  Patient #  [phone]  Daughter # [phone]

## 2023-04-10 ENCOUNTER — TELEPHONE (OUTPATIENT)
Dept: FAMILY MEDICINE | Facility: CLINIC | Age: 78
End: 2023-04-10

## 2023-04-10 NOTE — PROGRESS NOTES
Call patient's family.  Her blood work looks improved.  Her kidney function is greatly improved with a GFR increasing from 12.6 up to 27.  Liver enzymes look good also.  CBC shows she is no longer anemic.  Continue current medications

## 2023-04-10 NOTE — TELEPHONE ENCOUNTER
----- Message from Khadar Dwyer MD sent at 4/9/2023  7:23 PM CDT -----  Call patient's family.  Her blood work looks improved.  Her kidney function is greatly improved with a GFR increasing from 12.6 up to 27.  Liver enzymes look good also.  CBC shows she is no longer anemic.  Continue current medications

## 2023-04-11 NOTE — TELEPHONE ENCOUNTER
Patient daughter notified of lab results    Patient resting in bed. No signs of distress noted. Equal chest rise and fall. No further needs at this time. Call light within reach. Wife at bedside. Will continue to monitor pt.

## 2023-04-16 ENCOUNTER — HOSPITAL ENCOUNTER (EMERGENCY)
Facility: HOSPITAL | Age: 78
Discharge: HOME | End: 2023-04-16
Attending: EMERGENCY MEDICINE
Payer: MEDICARE

## 2023-04-16 VITALS
HEART RATE: 63 BPM | SYSTOLIC BLOOD PRESSURE: 164 MMHG | BODY MASS INDEX: 29.45 KG/M2 | TEMPERATURE: 64 F | DIASTOLIC BLOOD PRESSURE: 74 MMHG | RESPIRATION RATE: 20 BRPM | WEIGHT: 161 LBS | OXYGEN SATURATION: 99 %

## 2023-04-16 DIAGNOSIS — M79.662 PAIN AND SWELLING OF LEFT LOWER LEG: ICD-10-CM

## 2023-04-16 DIAGNOSIS — M79.89 PAIN AND SWELLING OF LEFT LOWER LEG: ICD-10-CM

## 2023-04-16 DIAGNOSIS — R29.898 WEAKNESS OF LEFT LOWER EXTREMITY: Primary | ICD-10-CM

## 2023-04-16 PROCEDURE — 99284 EMERGENCY DEPT VISIT MOD MDM: CPT | Mod: 25

## 2023-04-16 NOTE — ED NOTES
"DAUGHTER INFORMED OF PT DISCHARGE. VOICED "I WILL BE THERE WHEN I CAN". PT DOES NOT HAVE 02 TANK WITH HER.   "

## 2023-04-16 NOTE — ED PROVIDER NOTES
Encounter Date: 4/16/2023       History     Chief Complaint   Patient presents with    Leg Swelling     Left leg buckled today.      [age] year female past medical history of hypertension, diabetes, COPD, CHF presents secondary to left leg pain and swelling.  Patient states he was walking when she developed weakness in her left leg.  Upon EMS arrival patient was able to ambulate without difficulties.  She denies any chest pain, shortness of breath, nausea vomiting associated.  Patient is otherwise stable and has no other complaints.    Review of patient's allergies indicates:   Allergen Reactions    Iodinated contrast media     Strawberries [strawberry] Hives and Itching     Past Medical History:   Diagnosis Date    CAD (coronary artery disease)     Cancer     colon    CHF (congestive heart failure)     Colitis     Colon cancer     COPD (chronic obstructive pulmonary disease)     Decreased hearing     Diabetes mellitus     Diabetes mellitus, type 2     Hypercholesterolemia     Hypertension     Hypoxemia     Insomnia     Obesity     Osteoarthritis      Past Surgical History:   Procedure Laterality Date    ANGIOGRAM, CORONARY, WITH LEFT HEART CATHETERIZATION N/A 2/10/2022    Procedure: Angiogram, Coronary, with Left Heart Cath;  Surgeon: Cristian Benjamin MD;  Location: [facility] CATH/EP LAB;  Service: Cardiology;  Laterality: N/A;    CARDIAC CATHETERIZATION      CHOLECYSTECTOMY      COLECTOMY      CORONARY ANGIOPLASTY      CORONARY STENT PLACEMENT      ERCP N/A 1/16/2023    Procedure: ERCP (ENDOSCOPIC RETROGRADE CHOLANGIOPANCREATOGRAPHY);  Surgeon: Biju Kramer III, MD;  Location: [facility];  Service: Endoscopy;  Laterality: N/A;    ESOPHAGOGASTRODUODENOSCOPY N/A 1/29/2023    Procedure: EGD (ESOPHAGOGASTRODUODENOSCOPY);  Surgeon: Aarti Alvarez MD;  Location: [facility];  Service: Endoscopy;  Laterality: N/A;    EXTERNAL EAR SURGERY      EYE SURGERY      FLEXIBLE SIGMOIDOSCOPY N/A 9/19/2019     Procedure: SIGMOIDOSCOPY, FLEXIBLE;  Surgeon: Biju Kramer III, MD;  Location: [facility];  Service: Endoscopy;  Laterality: N/A;    HYSTERECTOMY      partial     Family History   Problem Relation Age of Onset    Febrile seizures Mother     Heart failure Father      Social History     Tobacco Use    Smoking status: Former     Packs/day: 3.00     Years: 50.00     Pack years: 150.00     Types: Cigarettes     Start date: 3/30/1964     Quit date: 2006     Years since quittin.1    Smokeless tobacco: Never    Tobacco comments:     ex smoker 15 years   Substance Use Topics    Alcohol use: Yes    Drug use: No     Review of Systems   Cardiovascular:  Positive for leg swelling.   All other systems reviewed and are negative.    Physical Exam     Initial Vitals [23 0335]   BP Pulse Resp Temp SpO2   (!) 144/66 61 19 98.2 °F (36.8 °C) 99 %      MAP       --         Physical Exam    Nursing note and vitals reviewed.  Constitutional: She appears well-developed and well-nourished. No distress.   HENT:   Head: Normocephalic and atraumatic.   Mouth/Throat: Oropharynx is clear and moist.   Eyes: Conjunctivae and EOM are normal. Pupils are equal, round, and reactive to light.   Neck: No tracheal deviation present. No JVD present.   Normal range of motion.  Cardiovascular:  Normal rate, regular rhythm, normal heart sounds and intact distal pulses.     Exam reveals no gallop and no friction rub.       No murmur heard.  Pulmonary/Chest: Breath sounds normal. No respiratory distress. She has no wheezes. She exhibits no tenderness.   Abdominal: Abdomen is soft. Bowel sounds are normal. She exhibits no distension. There is no abdominal tenderness. There is no rebound and no guarding.   Musculoskeletal:         General: Edema present. No tenderness. Normal range of motion.      Cervical back: Normal range of motion.     Neurological: She is alert and oriented to person, place, and time. She has normal strength. No cranial  nerve deficit or sensory deficit.   Skin: Skin is warm and dry. Capillary refill takes less than 2 seconds. No erythema.   Psychiatric: She has a normal mood and affect.       ED Course   Procedures  Labs Reviewed - No data to display       Imaging Results              US Lower Extremity Veins Left (Final result)  Result time 04/16/23 05:26:41      Final result by Dereje Gomez MD (04/16/23 05:26:41)                   Narrative:    UNILATERAL LOWER EXTREMITY VENOUS DOPPLER    CLINICAL HISTORY: Pain, swelling, possible deep venous thrombosis.    FINDINGS: Sonographic evaluation of the left lower extremity deep venous system was performed. Grayscale, color, and spectral Doppler analysis performed along with graded compression maneuvers where applicable.    Where visualized, there is no evidence of intraluminal thrombus to suggest deep venous thrombosis.    Note is made of probable occlusion of the proximal left superficial femoral artery. Consider dedicated arterial imaging if indicated.    IMPRESSION: No evidence of deep venous thrombosis.    Electronically signed by:  Dereje Gomez MD  4/16/2023 5:26 AM CDT Workstation: [id number]                                     Medications - No data to display  Medical Decision Making:   Initial Assessment:   [age] year female initial assessment in mild distress secondary to left lower extremity pain and weakness.  Patient is alert and oriented x3, neurologically and neurovascular intact with no focal deficits.  She is nontoxic-appearing and vitals stable at this time.  Differential Diagnosis:   DVT, cellulitis, peripheral edema  Clinical Tests:   Radiological Study: Ordered and Reviewed  ED Management:  Patient has been reassessed and noted to have no acute changes in condition.  Ultrasound is negative for any acute DVT.  Patient states she has normal sensation and movement and at her baseline.  She is remained stable while in the ED and discharged home stable  condition with PCP follow-up as needed.  Ms. Kerr is aware of the plan and in agreement with discharge.    Pt's plan and diagnosis was discussed. All questions were answered and patient was comfortable with the plan. This patient was personally seen and personally examined by me and I personally performed the services described in this documentation.   Complexity of the visit is established by the note or I have spent at least the amount of time discussing findings, exam and/or radiographs or imaging studies.     MD uses EPIC and voice recognition software prone to occasional and minor errors that may persist in the medical record.                          Clinical Impression:   Final diagnoses:  [M79.662, M79.89] Pain and swelling of left lower leg  [R29.898] Weakness of left lower extremity (Primary)        ED Disposition Condition    Discharge Stable          ED Prescriptions    None       Follow-up Information       Follow up With Specialties Details Why Contact Info Additional Information    [facility] - Emergency Dept Emergency Medicine  As needed, If symptoms worsen [address] [address]  [phone] 1st floor    Khadar Dwyer MD Family Medicine, Home Health Services, Hospice Services Schedule an appointment as soon as possible for a visit  As needed, If symptoms worsen [address]  [address]  [facility] [address]  [phone]                Paulie Harrison MD  04/16/23 0509

## 2023-04-17 ENCOUNTER — EXTERNAL HOME HEALTH (OUTPATIENT)
Dept: HOME HEALTH SERVICES | Facility: HOSPITAL | Age: 78
End: 2023-04-17

## 2023-04-24 ENCOUNTER — DOCUMENT SCAN (OUTPATIENT)
Dept: HOME HEALTH SERVICES | Facility: HOSPITAL | Age: 78
End: 2023-04-24
Payer: MEDICARE

## 2023-04-24 DIAGNOSIS — E55.9 VITAMIN D DEFICIENCY: ICD-10-CM

## 2023-04-24 DIAGNOSIS — I25.118 CORONARY ARTERY DISEASE OF NATIVE ARTERY OF NATIVE HEART WITH STABLE ANGINA PECTORIS: ICD-10-CM

## 2023-04-24 RX ORDER — ATORVASTATIN CALCIUM 40 MG/1
40 TABLET, FILM COATED ORAL DAILY
Qty: 90 TABLET | Refills: 3 | Status: SHIPPED | OUTPATIENT
Start: 2023-04-24 | End: 2023-11-04

## 2023-04-24 RX ORDER — ERGOCALCIFEROL 1.25 MG/1
50000 CAPSULE ORAL
Qty: 12 CAPSULE | Refills: 1 | Status: SHIPPED | OUTPATIENT
Start: 2023-04-24

## 2023-04-24 RX ORDER — METOPROLOL TARTRATE 50 MG/1
50 TABLET ORAL 2 TIMES DAILY
Qty: 180 TABLET | Refills: 3 | Status: SHIPPED | OUTPATIENT
Start: 2023-04-24 | End: 2023-06-27 | Stop reason: SDUPTHER

## 2023-04-24 RX ORDER — RANOLAZINE 500 MG/1
500 TABLET, EXTENDED RELEASE ORAL 2 TIMES DAILY
Qty: 180 TABLET | Refills: 1 | Status: SHIPPED | OUTPATIENT
Start: 2023-04-24 | End: 2023-06-27 | Stop reason: SDUPTHER

## 2023-05-01 PROBLEM — J96.11 CHRONIC RESPIRATORY FAILURE WITH HYPOXIA: Status: RESOLVED | Noted: 2018-04-25 | Resolved: 2023-05-01

## 2023-05-04 ENCOUNTER — LAB VISIT (OUTPATIENT)
Dept: LAB | Facility: HOSPITAL | Age: 78
End: 2023-05-04
Payer: MEDICARE

## 2023-05-04 DIAGNOSIS — I13.0 HYPERTENSIVE HEART AND RENAL DISEASE WITH CONGESTIVE HEART FAILURE: ICD-10-CM

## 2023-05-04 DIAGNOSIS — E11.22 TYPE 2 DIABETES MELLITUS WITH END-STAGE RENAL DISEASE: ICD-10-CM

## 2023-05-04 DIAGNOSIS — F32.A DEPRESSIVE TYPE PSYCHOSIS: ICD-10-CM

## 2023-05-04 DIAGNOSIS — I50.32 CHRONIC DIASTOLIC HEART FAILURE: ICD-10-CM

## 2023-05-04 DIAGNOSIS — L89.154 STAGE IV PRESSURE ULCER OF SACRAL REGION: Primary | ICD-10-CM

## 2023-05-04 DIAGNOSIS — N18.6 TYPE 2 DIABETES MELLITUS WITH END-STAGE RENAL DISEASE: ICD-10-CM

## 2023-05-04 LAB
ALBUMIN SERPL BCP-MCNC: 2.8 G/DL (ref 3.5–5.2)
ALP SERPL-CCNC: 111 U/L (ref 55–135)
ALT SERPL W/O P-5'-P-CCNC: 9 U/L (ref 10–44)
ANION GAP SERPL CALC-SCNC: 13 MMOL/L (ref 8–16)
AST SERPL-CCNC: 13 U/L (ref 10–40)
BASOPHILS # BLD AUTO: 0.04 K/UL (ref 0–0.2)
BASOPHILS NFR BLD: 0.5 % (ref 0–1.9)
BILIRUB SERPL-MCNC: 0.4 MG/DL (ref 0.1–1)
BUN SERPL-MCNC: 34 MG/DL (ref 8–23)
CALCIUM SERPL-MCNC: 9 MG/DL (ref 8.7–10.5)
CHLORIDE SERPL-SCNC: 102 MMOL/L (ref 95–110)
CO2 SERPL-SCNC: 24 MMOL/L (ref 23–29)
CREAT SERPL-MCNC: 2.2 MG/DL (ref 0.5–1.4)
CRP SERPL-MCNC: 38.7 MG/L (ref 0–8.2)
DIFFERENTIAL METHOD: ABNORMAL
EOSINOPHIL # BLD AUTO: 0.1 K/UL (ref 0–0.5)
EOSINOPHIL NFR BLD: 0.7 % (ref 0–8)
ERYTHROCYTE [DISTWIDTH] IN BLOOD BY AUTOMATED COUNT: 15.8 % (ref 11.5–14.5)
ERYTHROCYTE [SEDIMENTATION RATE] IN BLOOD BY WESTERGREN METHOD: 61 MM/HR (ref 0–20)
EST. GFR  (NO RACE VARIABLE): 23 ML/MIN/1.73 M^2
ESTIMATED AVG GLUCOSE: 94 MG/DL (ref 68–131)
GLUCOSE SERPL-MCNC: 120 MG/DL (ref 70–110)
HBA1C MFR BLD: 4.9 % (ref 4–5.6)
HCT VFR BLD AUTO: 33.9 % (ref 37–48.5)
HGB BLD-MCNC: 10.2 G/DL (ref 12–16)
IMM GRANULOCYTES # BLD AUTO: 0.03 K/UL (ref 0–0.04)
IMM GRANULOCYTES NFR BLD AUTO: 0.4 % (ref 0–0.5)
LYMPHOCYTES # BLD AUTO: 0.8 K/UL (ref 1–4.8)
LYMPHOCYTES NFR BLD: 10.2 % (ref 18–48)
MCH RBC QN AUTO: 27.1 PG (ref 27–31)
MCHC RBC AUTO-ENTMCNC: 30.1 G/DL (ref 32–36)
MCV RBC AUTO: 90 FL (ref 82–98)
MONOCYTES # BLD AUTO: 0.5 K/UL (ref 0.3–1)
MONOCYTES NFR BLD: 5.7 % (ref 4–15)
NEUTROPHILS # BLD AUTO: 6.6 K/UL (ref 1.8–7.7)
NEUTROPHILS NFR BLD: 82.5 % (ref 38–73)
NRBC BLD-RTO: 0 /100 WBC
PLATELET # BLD AUTO: 240 K/UL (ref 150–450)
PMV BLD AUTO: 11.8 FL (ref 9.2–12.9)
POTASSIUM SERPL-SCNC: 4.8 MMOL/L (ref 3.5–5.1)
PREALB SERPL-MCNC: 17 MG/DL (ref 20–43)
PROT SERPL-MCNC: 6 G/DL (ref 6–8.4)
RBC # BLD AUTO: 3.76 M/UL (ref 4–5.4)
SODIUM SERPL-SCNC: 139 MMOL/L (ref 136–145)
WBC # BLD AUTO: 8.02 K/UL (ref 3.9–12.7)

## 2023-05-04 PROCEDURE — 83036 HEMOGLOBIN GLYCOSYLATED A1C: CPT

## 2023-05-04 PROCEDURE — 85025 COMPLETE CBC W/AUTO DIFF WBC: CPT

## 2023-05-04 PROCEDURE — 84134 ASSAY OF PREALBUMIN: CPT

## 2023-05-04 PROCEDURE — 85651 RBC SED RATE NONAUTOMATED: CPT

## 2023-05-04 PROCEDURE — 80053 COMPREHEN METABOLIC PANEL: CPT

## 2023-05-04 PROCEDURE — 36415 COLL VENOUS BLD VENIPUNCTURE: CPT

## 2023-05-04 PROCEDURE — 86140 C-REACTIVE PROTEIN: CPT

## 2023-05-08 PROBLEM — J96.11 CHRONIC HYPOXEMIC RESPIRATORY FAILURE: Status: RESOLVED | Noted: 2019-01-16 | Resolved: 2023-05-08

## 2023-05-15 RX ORDER — ONDANSETRON 4 MG/1
4 TABLET, ORALLY DISINTEGRATING ORAL EVERY 6 HOURS PRN
Qty: 30 TABLET | Refills: 0 | Status: SHIPPED | OUTPATIENT
Start: 2023-05-15 | End: 2023-07-22

## 2023-05-15 NOTE — TELEPHONE ENCOUNTER
"LMOR letting pts daughter know we are sending prescription to the pharmacy.   Per Dr. Dwyer "zofran 4mg 1 po q 6hr prn nausea #30."   "

## 2023-05-15 NOTE — TELEPHONE ENCOUNTER
----- Message from Anni López sent at 5/15/2023  2:11 PM CDT -----  Maddie with [facility] home health called and stated that she need to speak to the nurse with some concerns that she has with the patient please give her a call at [phone]

## 2023-05-15 NOTE — TELEPHONE ENCOUNTER
"Spoke with Maddie, states she has been having nausea and diarrhea for the last week now. No fever. Has been going on since Sunday but she got better in the middle of the week but it started again yesterday. States it sounds like the "typical course of GI bug" States she was vomiting yesterday but nurse states she looks good today. Able to eat and drink. Does not have anything at home for nausea or vomiting but wants something sent.     Out of Brilinta, needs refill     Legs are swollen but she talked to the other nurse who normally sees her and she said     Printed  "

## 2023-05-17 PROCEDURE — G0179 PR HOME HEALTH MD RECERTIFICATION: ICD-10-PCS | Mod: ,,, | Performed by: FAMILY MEDICINE

## 2023-05-17 PROCEDURE — G0179 MD RECERTIFICATION HHA PT: HCPCS | Mod: ,,, | Performed by: FAMILY MEDICINE

## 2023-05-25 ENCOUNTER — HOSPITAL ENCOUNTER (INPATIENT)
Facility: HOSPITAL | Age: 78
LOS: 7 days | Discharge: NURSING, REHAB OR LONG-TERM CARE | DRG: 280 | End: 2023-06-01
Attending: EMERGENCY MEDICINE | Admitting: INTERNAL MEDICINE
Payer: MEDICARE

## 2023-05-25 DIAGNOSIS — I73.9 PAD (PERIPHERAL ARTERY DISEASE): ICD-10-CM

## 2023-05-25 DIAGNOSIS — R06.02 SOB (SHORTNESS OF BREATH): ICD-10-CM

## 2023-05-25 DIAGNOSIS — R07.9 CHEST PAIN: ICD-10-CM

## 2023-05-25 DIAGNOSIS — R06.02 SHORTNESS OF BREATH: ICD-10-CM

## 2023-05-25 DIAGNOSIS — R94.31 PROLONGED QT INTERVAL: ICD-10-CM

## 2023-05-25 DIAGNOSIS — I50.9 CHF (CONGESTIVE HEART FAILURE): ICD-10-CM

## 2023-05-25 DIAGNOSIS — J96.21 ACUTE ON CHRONIC RESPIRATORY FAILURE WITH HYPOXIA: Primary | ICD-10-CM

## 2023-05-25 DIAGNOSIS — R94.31 ABNORMAL EKG: ICD-10-CM

## 2023-05-25 LAB
ALBUMIN SERPL BCP-MCNC: 3.1 G/DL (ref 3.5–5.2)
ALLENS TEST: ABNORMAL
ALLENS TEST: ABNORMAL
ALP SERPL-CCNC: 97 U/L (ref 55–135)
ALT SERPL W/O P-5'-P-CCNC: 16 U/L (ref 10–44)
ANION GAP SERPL CALC-SCNC: 9 MMOL/L (ref 8–16)
APTT PPP: 24.8 SEC (ref 21–32)
AST SERPL-CCNC: 23 U/L (ref 10–40)
BASOPHILS # BLD AUTO: 0.03 K/UL (ref 0–0.2)
BASOPHILS NFR BLD: 0.3 % (ref 0–1.9)
BILIRUB SERPL-MCNC: 0.6 MG/DL (ref 0.1–1)
BNP SERPL-MCNC: 622 PG/ML (ref 0–99)
BUN SERPL-MCNC: 25 MG/DL (ref 8–23)
CALCIUM SERPL-MCNC: 8.4 MG/DL (ref 8.7–10.5)
CHLORIDE SERPL-SCNC: 98 MMOL/L (ref 95–110)
CO2 SERPL-SCNC: 29 MMOL/L (ref 23–29)
CREAT SERPL-MCNC: 1.8 MG/DL (ref 0.5–1.4)
DELSYS: ABNORMAL
DELSYS: ABNORMAL
DIFFERENTIAL METHOD: ABNORMAL
EOSINOPHIL # BLD AUTO: 0 K/UL (ref 0–0.5)
EOSINOPHIL NFR BLD: 0.3 % (ref 0–8)
EP: 8
EP: 8
ERYTHROCYTE [DISTWIDTH] IN BLOOD BY AUTOMATED COUNT: 17.1 % (ref 11.5–14.5)
ERYTHROCYTE [SEDIMENTATION RATE] IN BLOOD BY WESTERGREN METHOD: 14 MM/H
ERYTHROCYTE [SEDIMENTATION RATE] IN BLOOD BY WESTERGREN METHOD: 20 MM/H
EST. GFR  (NO RACE VARIABLE): 29 ML/MIN/1.73 M^2
FIO2: 100
FIO2: 30
GLUCOSE SERPL-MCNC: 128 MG/DL (ref 70–110)
GLUCOSE SERPL-MCNC: 155 MG/DL (ref 70–110)
HCO3 UR-SCNC: 31.8 MMOL/L (ref 24–28)
HCO3 UR-SCNC: 33.3 MMOL/L (ref 24–28)
HCT VFR BLD AUTO: 37.1 % (ref 37–48.5)
HGB BLD-MCNC: 11 G/DL (ref 12–16)
IMM GRANULOCYTES # BLD AUTO: 0.05 K/UL (ref 0–0.04)
IMM GRANULOCYTES NFR BLD AUTO: 0.4 % (ref 0–0.5)
INR PPP: 0.9 (ref 0.8–1.2)
IP: 16
IP: 16
LYMPHOCYTES # BLD AUTO: 2 K/UL (ref 1–4.8)
LYMPHOCYTES NFR BLD: 17 % (ref 18–48)
MAGNESIUM SERPL-MCNC: 1.8 MG/DL (ref 1.6–2.6)
MCH RBC QN AUTO: 26.8 PG (ref 27–31)
MCHC RBC AUTO-ENTMCNC: 29.6 G/DL (ref 32–36)
MCV RBC AUTO: 91 FL (ref 82–98)
MODE: ABNORMAL
MODE: ABNORMAL
MONOCYTES # BLD AUTO: 0.6 K/UL (ref 0.3–1)
MONOCYTES NFR BLD: 5 % (ref 4–15)
NEUTROPHILS # BLD AUTO: 9.1 K/UL (ref 1.8–7.7)
NEUTROPHILS NFR BLD: 77 % (ref 38–73)
NRBC BLD-RTO: 0 /100 WBC
PCO2 BLDA: 51.7 MMHG (ref 35–45)
PCO2 BLDA: 69.8 MMHG (ref 35–45)
PH SMN: 7.29 [PH] (ref 7.35–7.45)
PH SMN: 7.4 [PH] (ref 7.35–7.45)
PLATELET # BLD AUTO: 205 K/UL (ref 150–450)
PMV BLD AUTO: 12.5 FL (ref 9.2–12.9)
PO2 BLDA: 437 MMHG (ref 80–100)
PO2 BLDA: 67 MMHG (ref 80–100)
POC BE: 7 MMOL/L
POC BE: 7 MMOL/L
POC SATURATED O2: 100 % (ref 95–100)
POC SATURATED O2: 93 % (ref 95–100)
POC TCO2: 33 MMOL/L (ref 23–27)
POC TCO2: 35 MMOL/L (ref 23–27)
POTASSIUM SERPL-SCNC: 4.8 MMOL/L (ref 3.5–5.1)
PROT SERPL-MCNC: 6.8 G/DL (ref 6–8.4)
PROTHROMBIN TIME: 10.3 SEC (ref 9–12.5)
RBC # BLD AUTO: 4.1 M/UL (ref 4–5.4)
SAMPLE: ABNORMAL
SAMPLE: ABNORMAL
SARS-COV-2 RDRP RESP QL NAA+PROBE: NEGATIVE
SITE: ABNORMAL
SITE: ABNORMAL
SODIUM SERPL-SCNC: 136 MMOL/L (ref 136–145)
SPONT RATE: 23
TROPONIN I SERPL HS-MCNC: 14.9 PG/ML (ref 0–14.9)
TROPONIN I SERPL HS-MCNC: 159.7 PG/ML (ref 0–14.9)
WBC # BLD AUTO: 11.78 K/UL (ref 3.9–12.7)

## 2023-05-25 PROCEDURE — 80053 COMPREHEN METABOLIC PANEL: CPT | Performed by: EMERGENCY MEDICINE

## 2023-05-25 PROCEDURE — 85730 THROMBOPLASTIN TIME PARTIAL: CPT | Performed by: EMERGENCY MEDICINE

## 2023-05-25 PROCEDURE — 99900031 HC PATIENT EDUCATION (STAT)

## 2023-05-25 PROCEDURE — 20000000 HC ICU ROOM

## 2023-05-25 PROCEDURE — 99900035 HC TECH TIME PER 15 MIN (STAT)

## 2023-05-25 PROCEDURE — 93010 EKG 12-LEAD: ICD-10-PCS | Mod: ,,, | Performed by: SPECIALIST

## 2023-05-25 PROCEDURE — 63600175 PHARM REV CODE 636 W HCPCS: Performed by: EMERGENCY MEDICINE

## 2023-05-25 PROCEDURE — 63600175 PHARM REV CODE 636 W HCPCS: Performed by: INTERNAL MEDICINE

## 2023-05-25 PROCEDURE — 83735 ASSAY OF MAGNESIUM: CPT | Performed by: EMERGENCY MEDICINE

## 2023-05-25 PROCEDURE — 93005 ELECTROCARDIOGRAM TRACING: CPT | Performed by: SPECIALIST

## 2023-05-25 PROCEDURE — 99291 CRITICAL CARE FIRST HOUR: CPT

## 2023-05-25 PROCEDURE — 36600 WITHDRAWAL OF ARTERIAL BLOOD: CPT

## 2023-05-25 PROCEDURE — 93010 EKG 12-LEAD: ICD-10-PCS | Mod: ,,, | Performed by: INTERNAL MEDICINE

## 2023-05-25 PROCEDURE — 93005 ELECTROCARDIOGRAM TRACING: CPT | Performed by: INTERNAL MEDICINE

## 2023-05-25 PROCEDURE — 82803 BLOOD GASES ANY COMBINATION: CPT

## 2023-05-25 PROCEDURE — 84484 ASSAY OF TROPONIN QUANT: CPT | Performed by: EMERGENCY MEDICINE

## 2023-05-25 PROCEDURE — 94761 N-INVAS EAR/PLS OXIMETRY MLT: CPT

## 2023-05-25 PROCEDURE — 27000221 HC OXYGEN, UP TO 24 HOURS

## 2023-05-25 PROCEDURE — 85610 PROTHROMBIN TIME: CPT | Performed by: EMERGENCY MEDICINE

## 2023-05-25 PROCEDURE — 83880 ASSAY OF NATRIURETIC PEPTIDE: CPT | Performed by: EMERGENCY MEDICINE

## 2023-05-25 PROCEDURE — 93010 ELECTROCARDIOGRAM REPORT: CPT | Mod: ,,, | Performed by: INTERNAL MEDICINE

## 2023-05-25 PROCEDURE — 25000003 PHARM REV CODE 250: Performed by: INTERNAL MEDICINE

## 2023-05-25 PROCEDURE — U0002 COVID-19 LAB TEST NON-CDC: HCPCS | Performed by: EMERGENCY MEDICINE

## 2023-05-25 PROCEDURE — 85025 COMPLETE CBC W/AUTO DIFF WBC: CPT | Performed by: EMERGENCY MEDICINE

## 2023-05-25 PROCEDURE — 82962 GLUCOSE BLOOD TEST: CPT

## 2023-05-25 PROCEDURE — 96374 THER/PROPH/DIAG INJ IV PUSH: CPT

## 2023-05-25 PROCEDURE — 93010 ELECTROCARDIOGRAM REPORT: CPT | Mod: ,,, | Performed by: SPECIALIST

## 2023-05-25 PROCEDURE — 25000003 PHARM REV CODE 250: Performed by: EMERGENCY MEDICINE

## 2023-05-25 PROCEDURE — 84484 ASSAY OF TROPONIN QUANT: CPT | Mod: 91 | Performed by: INTERNAL MEDICINE

## 2023-05-25 RX ORDER — INSULIN ASPART 100 [IU]/ML
0-8 INJECTION, SOLUTION INTRAVENOUS; SUBCUTANEOUS EVERY 6 HOURS
Status: ACTIVE | OUTPATIENT
Start: 2023-05-25 | End: 2023-05-26

## 2023-05-25 RX ORDER — GLUCAGON 1 MG
1 KIT INJECTION
Status: DISCONTINUED | OUTPATIENT
Start: 2023-05-25 | End: 2023-06-01 | Stop reason: HOSPADM

## 2023-05-25 RX ORDER — ASPIRIN 81 MG/1
81 TABLET ORAL EVERY MORNING
Status: DISCONTINUED | OUTPATIENT
Start: 2023-05-26 | End: 2023-05-26

## 2023-05-25 RX ORDER — RANOLAZINE 500 MG/1
500 TABLET, EXTENDED RELEASE ORAL 2 TIMES DAILY
Status: DISCONTINUED | OUTPATIENT
Start: 2023-05-25 | End: 2023-06-01 | Stop reason: HOSPADM

## 2023-05-25 RX ORDER — MIDODRINE HYDROCHLORIDE 2.5 MG/1
5 TABLET ORAL 3 TIMES DAILY PRN
Status: DISCONTINUED | OUTPATIENT
Start: 2023-05-25 | End: 2023-06-01 | Stop reason: HOSPADM

## 2023-05-25 RX ORDER — ATORVASTATIN CALCIUM 40 MG/1
40 TABLET, FILM COATED ORAL DAILY
Status: DISCONTINUED | OUTPATIENT
Start: 2023-05-26 | End: 2023-06-01 | Stop reason: HOSPADM

## 2023-05-25 RX ORDER — METOPROLOL TARTRATE 50 MG/1
50 TABLET ORAL 2 TIMES DAILY
Status: DISCONTINUED | OUTPATIENT
Start: 2023-05-25 | End: 2023-06-01 | Stop reason: HOSPADM

## 2023-05-25 RX ORDER — DILTIAZEM HYDROCHLORIDE 120 MG/1
120 CAPSULE, COATED, EXTENDED RELEASE ORAL DAILY
Status: DISCONTINUED | OUTPATIENT
Start: 2023-05-26 | End: 2023-06-01 | Stop reason: HOSPADM

## 2023-05-25 RX ORDER — ISOSORBIDE MONONITRATE 60 MG/1
120 TABLET, EXTENDED RELEASE ORAL DAILY
Status: DISCONTINUED | OUTPATIENT
Start: 2023-05-26 | End: 2023-05-26

## 2023-05-25 RX ORDER — LEVOFLOXACIN 5 MG/ML
250 INJECTION, SOLUTION INTRAVENOUS
Status: DISCONTINUED | OUTPATIENT
Start: 2023-05-26 | End: 2023-05-28

## 2023-05-25 RX ORDER — NALOXONE HCL 0.4 MG/ML
0.02 VIAL (ML) INJECTION
Status: DISCONTINUED | OUTPATIENT
Start: 2023-05-25 | End: 2023-06-01 | Stop reason: HOSPADM

## 2023-05-25 RX ORDER — FUROSEMIDE 10 MG/ML
80 INJECTION INTRAMUSCULAR; INTRAVENOUS
Status: COMPLETED | OUTPATIENT
Start: 2023-05-25 | End: 2023-05-25

## 2023-05-25 RX ORDER — SODIUM CHLORIDE 0.9 % (FLUSH) 0.9 %
10 SYRINGE (ML) INJECTION EVERY 12 HOURS PRN
Status: DISCONTINUED | OUTPATIENT
Start: 2023-05-25 | End: 2023-06-01 | Stop reason: HOSPADM

## 2023-05-25 RX ORDER — FUROSEMIDE 10 MG/ML
40 INJECTION INTRAMUSCULAR; INTRAVENOUS DAILY
Status: DISCONTINUED | OUTPATIENT
Start: 2023-05-26 | End: 2023-05-26

## 2023-05-25 RX ORDER — LEVOFLOXACIN 5 MG/ML
500 INJECTION, SOLUTION INTRAVENOUS
Status: COMPLETED | OUTPATIENT
Start: 2023-05-25 | End: 2023-05-25

## 2023-05-25 RX ORDER — IBUPROFEN 200 MG
16 TABLET ORAL
Status: DISCONTINUED | OUTPATIENT
Start: 2023-05-25 | End: 2023-06-01 | Stop reason: HOSPADM

## 2023-05-25 RX ORDER — HEPARIN SODIUM 5000 [USP'U]/ML
5000 INJECTION, SOLUTION INTRAVENOUS; SUBCUTANEOUS EVERY 8 HOURS
Status: DISCONTINUED | OUTPATIENT
Start: 2023-05-25 | End: 2023-05-26

## 2023-05-25 RX ORDER — ASPIRIN 325 MG
325 TABLET ORAL
Status: COMPLETED | OUTPATIENT
Start: 2023-05-25 | End: 2023-05-25

## 2023-05-25 RX ORDER — IBUPROFEN 200 MG
24 TABLET ORAL
Status: DISCONTINUED | OUTPATIENT
Start: 2023-05-25 | End: 2023-06-01 | Stop reason: HOSPADM

## 2023-05-25 RX ADMIN — LEVOFLOXACIN 500 MG: 5 INJECTION, SOLUTION INTRAVENOUS at 10:05

## 2023-05-25 RX ADMIN — RANOLAZINE 500 MG: 500 TABLET, EXTENDED RELEASE ORAL at 10:05

## 2023-05-25 RX ADMIN — FUROSEMIDE 80 MG: 10 INJECTION, SOLUTION INTRAMUSCULAR; INTRAVENOUS at 04:05

## 2023-05-25 RX ADMIN — TICAGRELOR 90 MG: 90 TABLET ORAL at 10:05

## 2023-05-25 RX ADMIN — ASPIRIN 325 MG: 325 TABLET ORAL at 04:05

## 2023-05-25 RX ADMIN — NITROGLYCERIN 1 INCH: 20 OINTMENT TOPICAL at 04:05

## 2023-05-25 RX ADMIN — METHYLPREDNISOLONE SODIUM SUCCINATE 40 MG: 40 INJECTION, POWDER, FOR SOLUTION INTRAMUSCULAR; INTRAVENOUS at 10:05

## 2023-05-25 RX ADMIN — HEPARIN SODIUM 5000 UNITS: 5000 INJECTION INTRAVENOUS; SUBCUTANEOUS at 10:05

## 2023-05-25 NOTE — PHARMACY MED REC
"    Admission Medication History     The home medication history was taken by Jennie Muñoz.    You may go to "Admission" then "Reconcile Home Medications" tabs to review and/or act upon these items.     The home medication list has been updated by the Pharmacy department.   Please read ALL comments highlighted in yellow.   Please address this information as you see fit.    Feel free to contact us if you have any questions or require assistance.        Medications listed below were obtained from: Patient/family  No current facility-administered medications on file prior to encounter.     Current Outpatient Medications on File Prior to Encounter   Medication Sig Dispense Refill    albuterol (VENTOLIN HFA) 90 mcg/actuation inhaler Inhale 2 puffs into the lungs every 6 (six) hours as needed for Wheezing or Shortness of Breath. Rescue 36 g 3    albuterol-ipratropium (DUO-NEB) 2.5 mg-0.5 mg/3 mL nebulizer solution Take 3 mLs by nebulization every 4 (four) hours as needed for Wheezing or Shortness of Breath. Rescue 120 each 6    allopurinoL (ZYLOPRIM) 100 MG tablet Take 0.5 tablets (50 mg total) by mouth once daily. 45 tablet 1    aspirin (ECOTRIN) 81 MG EC tablet Take 1 tablet (81 mg total) by mouth every morning. 90 tablet 3    atorvastatin (LIPITOR) 40 MG tablet Take 1 tablet (40 mg total) by mouth once daily. 90 tablet 3    cholecalciferol, vitamin D3, 125 mcg (5,000 unit) Tab Take 5,000 Units by mouth once daily.      cholestyramine (QUESTRAN) 4 gram packet Take 1 packet (4 g total) by mouth 2 (two) times daily. 180 packet 3    coenzyme Q10 100 mg capsule Take 100 mg by mouth every morning.      diltiaZEM (CARDIZEM CD) 120 MG Cp24 Take 1 capsule (120 mg total) by mouth once daily. 90 capsule 1    ergocalciferol (VITAMIN D2) 50,000 unit Cap Take 1 capsule (50,000 Units total) by mouth every 7 days. 12 capsule 1    ferrous sulfate 325 (65 FE) MG EC tablet Take 325 mg by mouth once daily.      fish oil-omega-3 fatty " acids 300-1,000 mg capsule Take 2 capsules by mouth every morning.      ipratropium-albuteroL (COMBIVENT RESPIMAT)  mcg/actuation inhaler Inhale 2 puffs into the lungs every 4 (four) hours as needed for Shortness of Breath. Rescue 12 g 3    isosorbide mononitrate (IMDUR) 120 MG 24 hr tablet Take 1 tablet (120 mg total) by mouth once daily. 90 tablet 1    ondansetron (ZOFRAN-ODT) 4 MG TbDL Take 1 tablet (4 mg total) by mouth every 6 (six) hours as needed (nausea). 30 tablet 0    pantoprazole (PROTONIX) 40 MG tablet Take 1 tablet (40 mg total) by mouth once daily. 90 tablet 1    polycarbophil (FIBERCON) 625 mg tablet Take 625 mg by mouth once daily.      ranolazine (RANEXA) 500 MG Tb12 Take 1 tablet (500 mg total) by mouth 2 (two) times daily. 180 tablet 1    ticagrelor (BRILINTA) 90 mg tablet Take 1 tablet (90 mg total) by mouth every 12 (twelve) hours. 60 tablet 5    umeclidinium (INCRUSE ELLIPTA) 62.5 mcg/actuation inhalation capsule Inhale 62.5 mcg into the lungs every morning. Controller 30 each 11    vit C/E/Zn/coppr/lutein/zeaxan (PRESERVISION AREDS-2 ORAL) Take 1 tablet by mouth once daily.      furosemide (LASIX) 40 MG tablet Take 1 tablet (40 mg total) by mouth once daily. 30 tablet 0    hydrALAZINE (APRESOLINE) 50 MG tablet Take 1 tablet (50 mg total) by mouth every 12 (twelve) hours. 60 tablet 0    metoprolol tartrate (LOPRESSOR) 50 MG tablet Take 1 tablet (50 mg total) by mouth 2 (two) times daily. 180 tablet 3    mupirocin (BACTROBAN) 2 % ointment Apply topically 2 (two) times daily. 30 g 3    nebulizer and compressor Reshma as directed 1 each 0    nitroGLYCERIN 0.4 MG/DOSE TL SPRY (NITROLINGUAL) 400 mcg/spray spray Place 1 spray under the tongue every 5 (five) minutes as needed for Chest pain (max of 3 doses). 4.9 g 1    [DISCONTINUED] benazepriL (LOTENSIN) 40 MG tablet Take 1 tablet (40 mg total) by mouth once daily. 90 tablet 1    [DISCONTINUED] cimetidine (TAGAMET) 300 MG tablet Take 1 tablet  (300 mg total) by mouth every 6 (six) hours. Take 1 tablet (300 mg total) by mouth starting at 6 PM on Sunday April 17, 2022 (the evening before your angiogram procedure). Then take 1 tablet at 12 AM, then take 1 tablet at 6 AM on Monday April 18th. 3 tablet 0    [DISCONTINUED] lisinopriL 10 MG tablet Take 1 tablet (10 mg total) by mouth once daily. HOLD UNTIL OTHERWISE DIRECTED BY PRIMARY CARE PROVIDER 90 tablet 3    [DISCONTINUED] lovastatin (MEVACOR) 40 MG tablet Take 1 tablet (40 mg total) by mouth every other day. 45 tablet 3         Jennie Muñoz  [id number]             .

## 2023-05-26 ENCOUNTER — CLINICAL SUPPORT (OUTPATIENT)
Dept: CARDIOLOGY | Facility: HOSPITAL | Age: 78
DRG: 280 | End: 2023-05-26
Attending: INTERNAL MEDICINE
Payer: MEDICARE

## 2023-05-26 VITALS — WEIGHT: 151 LBS | HEIGHT: 64 IN | BODY MASS INDEX: 25.78 KG/M2

## 2023-05-26 PROBLEM — J96.22 ACUTE ON CHRONIC RESPIRATORY FAILURE WITH HYPOXIA AND HYPERCAPNIA: Status: ACTIVE | Noted: 2023-05-26

## 2023-05-26 PROBLEM — J96.21 ACUTE ON CHRONIC RESPIRATORY FAILURE WITH HYPOXIA AND HYPERCAPNIA: Status: ACTIVE | Noted: 2023-05-26

## 2023-05-26 PROBLEM — R94.31 PROLONGED QT INTERVAL: Status: ACTIVE | Noted: 2023-05-26

## 2023-05-26 LAB
ALBUMIN SERPL BCP-MCNC: 2.9 G/DL (ref 3.5–5.2)
ALLENS TEST: ABNORMAL
ALP SERPL-CCNC: 83 U/L (ref 55–135)
ALT SERPL W/O P-5'-P-CCNC: 14 U/L (ref 10–44)
ANION GAP SERPL CALC-SCNC: 12 MMOL/L (ref 8–16)
ANISOCYTOSIS BLD QL SMEAR: SLIGHT
AORTIC ROOT ANNULUS: 2.6 CM
AORTIC VALVE CUSP SEPERATION: 1.6 CM
APTT PPP: 25.6 SEC (ref 21–32)
APTT PPP: 55.6 SEC (ref 21–32)
AST SERPL-CCNC: 20 U/L (ref 10–40)
AV INDEX (PROSTH): 0.71
AV MEAN GRADIENT: 4 MMHG
AV PEAK GRADIENT: 8 MMHG
AV VALVE AREA: 2.02 CM2
AV VELOCITY RATIO: 0.7
BASOPHILS NFR BLD: 0 % (ref 0–1.9)
BILIRUB SERPL-MCNC: 0.9 MG/DL (ref 0.1–1)
BSA FOR ECHO PROCEDURE: 1.76 M2
BUN SERPL-MCNC: 29 MG/DL (ref 8–23)
CALCIUM SERPL-MCNC: 8.5 MG/DL (ref 8.7–10.5)
CHLORIDE SERPL-SCNC: 96 MMOL/L (ref 95–110)
CO2 SERPL-SCNC: 29 MMOL/L (ref 23–29)
CREAT SERPL-MCNC: 1.9 MG/DL (ref 0.5–1.4)
CV ECHO LV RWT: 0.58 CM
D DIMER PPP IA.FEU-MCNC: 1.5 MG/L FEU
DELSYS: ABNORMAL
DIFFERENTIAL METHOD: ABNORMAL
DOP CALC AO PEAK VEL: 1.45 M/S
DOP CALC AO VTI: 28.2 CM
DOP CALC LVOT AREA: 2.8 CM2
DOP CALC LVOT DIAMETER: 1.9 CM
DOP CALC LVOT PEAK VEL: 1.02 M/S
DOP CALC LVOT STROKE VOLUME: 56.96 CM3
DOP CALC MV VTI: 26 CM
DOP CALCLVOT PEAK VEL VTI: 20.1 CM
E WAVE DECELERATION TIME: 243 MSEC
E/A RATIO: 0.78
E/E' RATIO: 15.11 M/S
ECHO LV POSTERIOR WALL: 1.12 CM (ref 0.6–1.1)
EJECTION FRACTION: 65 %
EOSINOPHIL NFR BLD: 0 % (ref 0–8)
EP: 8
ERYTHROCYTE [DISTWIDTH] IN BLOOD BY AUTOMATED COUNT: 16.9 % (ref 11.5–14.5)
EST. GFR  (NO RACE VARIABLE): 27.2 ML/MIN/1.73 M^2
FIO2: 30
FRACTIONAL SHORTENING: 28 % (ref 28–44)
GLUCOSE SERPL-MCNC: 157 MG/DL (ref 70–110)
GLUCOSE SERPL-MCNC: 159 MG/DL (ref 70–110)
GLUCOSE SERPL-MCNC: 180 MG/DL (ref 70–110)
GLUCOSE SERPL-MCNC: 180 MG/DL (ref 70–110)
HCO3 UR-SCNC: 32.1 MMOL/L (ref 24–28)
HCT VFR BLD AUTO: 34.4 % (ref 37–48.5)
HGB BLD-MCNC: 10.5 G/DL (ref 12–16)
IMM GRANULOCYTES # BLD AUTO: ABNORMAL K/UL (ref 0–0.04)
IMM GRANULOCYTES NFR BLD AUTO: ABNORMAL % (ref 0–0.5)
INTERVENTRICULAR SEPTUM: 1.06 CM (ref 0.6–1.1)
IP: 16
IVC DIAMETER: 2.33 CM
LEFT ATRIUM VOLUME INDEX MOD: 21.3 ML/M2
LEFT ATRIUM VOLUME MOD: 37.1 CM3
LEFT INTERNAL DIMENSION IN SYSTOLE: 2.76 CM (ref 2.1–4)
LEFT VENTRICLE DIASTOLIC VOLUME INDEX: 41.38 ML/M2
LEFT VENTRICLE DIASTOLIC VOLUME: 72 ML
LEFT VENTRICLE MASS INDEX: 78 G/M2
LEFT VENTRICLE SYSTOLIC VOLUME INDEX: 18.9 ML/M2
LEFT VENTRICLE SYSTOLIC VOLUME: 32.8 ML
LEFT VENTRICULAR INTERNAL DIMENSION IN DIASTOLE: 3.85 CM (ref 3.5–6)
LEFT VENTRICULAR MASS: 135.54 G
LV LATERAL E/E' RATIO: 13.6 M/S
LV SEPTAL E/E' RATIO: 17 M/S
LVOT MG: 2 MMHG
LVOT MV: 0.64 CM/S
LYMPHOCYTES NFR BLD: 8 % (ref 18–48)
MAGNESIUM SERPL-MCNC: 1.9 MG/DL (ref 1.6–2.6)
MCH RBC QN AUTO: 27.1 PG (ref 27–31)
MCHC RBC AUTO-ENTMCNC: 30.5 G/DL (ref 32–36)
MCV RBC AUTO: 89 FL (ref 82–98)
MODE: ABNORMAL
MONOCYTES NFR BLD: 0 % (ref 4–15)
MV MEAN GRADIENT: 2 MMHG
MV PEAK A VEL: 0.87 M/S
MV PEAK E VEL: 0.68 M/S
MV PEAK GRADIENT: 4 MMHG
MV STENOSIS PRESSURE HALF TIME: 91 MS
MV VALVE AREA BY CONTINUITY EQUATION: 2.19 CM2
MV VALVE AREA P 1/2 METHOD: 2.42 CM2
NEUTROPHILS NFR BLD: 80 % (ref 38–73)
NEUTS BAND NFR BLD MANUAL: 12 %
NRBC BLD-RTO: 0 /100 WBC
OHS LV EJECTION FRACTION SIMPSONS BIPLANE MOD: 5 %
PCO2 BLDA: 53.2 MMHG (ref 35–45)
PH SMN: 7.39 [PH] (ref 7.35–7.45)
PISA TR MAX VEL: 2.67 M/S
PLATELET # BLD AUTO: 164 K/UL (ref 150–450)
PLATELET BLD QL SMEAR: ABNORMAL
PMV BLD AUTO: 12.4 FL (ref 9.2–12.9)
PO2 BLDA: 76 MMHG (ref 80–100)
POC BE: 7 MMOL/L
POC SATURATED O2: 94 % (ref 95–100)
POC TCO2: 34 MMOL/L (ref 23–27)
POTASSIUM SERPL-SCNC: 4.5 MMOL/L (ref 3.5–5.1)
PROCALCITONIN SERPL IA-MCNC: 0.88 NG/ML (ref 0–0.5)
PROT SERPL-MCNC: 6.4 G/DL (ref 6–8.4)
PV MV: 0.98 M/S
PV PEAK VELOCITY: 1.55 CM/S
RA PRESSURE: 3 MMHG
RBC # BLD AUTO: 3.87 M/UL (ref 4–5.4)
RV TISSUE DOPPLER FREE WALL SYSTOLIC VELOCITY 1 (APICAL 4 CHAMBER VIEW): 0.01 CM/S
SAMPLE: ABNORMAL
SINUS: 2.65 CM
SITE: ABNORMAL
SODIUM SERPL-SCNC: 137 MMOL/L (ref 136–145)
TDI LATERAL: 0.05 M/S
TDI SEPTAL: 0.04 M/S
TDI: 0.05 M/S
TR MAX PG: 29 MMHG
TRICUSPID ANNULAR PLANE SYSTOLIC EXCURSION: 2.26 CM
TROPONIN I SERPL HS-MCNC: 546.8 PG/ML (ref 0–14.9)
TROPONIN I SERPL HS-MCNC: 623.7 PG/ML (ref 0–14.9)
TV REST PULMONARY ARTERY PRESSURE: 32 MMHG
WBC # BLD AUTO: 6.43 K/UL (ref 3.9–12.7)

## 2023-05-26 PROCEDURE — 36415 COLL VENOUS BLD VENIPUNCTURE: CPT | Performed by: INTERNAL MEDICINE

## 2023-05-26 PROCEDURE — 99223 PR INITIAL HOSPITAL CARE,LEVL III: ICD-10-PCS | Mod: ,,, | Performed by: INTERNAL MEDICINE

## 2023-05-26 PROCEDURE — 82803 BLOOD GASES ANY COMBINATION: CPT

## 2023-05-26 PROCEDURE — 93306 TTE W/DOPPLER COMPLETE: CPT | Mod: 26,,, | Performed by: GENERAL PRACTICE

## 2023-05-26 PROCEDURE — 36600 WITHDRAWAL OF ARTERIAL BLOOD: CPT

## 2023-05-26 PROCEDURE — 80053 COMPREHEN METABOLIC PANEL: CPT | Performed by: INTERNAL MEDICINE

## 2023-05-26 PROCEDURE — 84484 ASSAY OF TROPONIN QUANT: CPT | Performed by: INTERNAL MEDICINE

## 2023-05-26 PROCEDURE — 99900035 HC TECH TIME PER 15 MIN (STAT)

## 2023-05-26 PROCEDURE — 94660 CPAP INITIATION&MGMT: CPT

## 2023-05-26 PROCEDURE — 99291 PR CRITICAL CARE, E/M 30-74 MINUTES: ICD-10-PCS | Mod: ,,, | Performed by: INTERNAL MEDICINE

## 2023-05-26 PROCEDURE — 85007 BL SMEAR W/DIFF WBC COUNT: CPT | Performed by: INTERNAL MEDICINE

## 2023-05-26 PROCEDURE — 25000003 PHARM REV CODE 250: Performed by: HOSPITALIST

## 2023-05-26 PROCEDURE — 94761 N-INVAS EAR/PLS OXIMETRY MLT: CPT

## 2023-05-26 PROCEDURE — 99900031 HC PATIENT EDUCATION (STAT)

## 2023-05-26 PROCEDURE — 63600175 PHARM REV CODE 636 W HCPCS: Performed by: INTERNAL MEDICINE

## 2023-05-26 PROCEDURE — 93010 EKG 12-LEAD: ICD-10-PCS | Mod: ,,, | Performed by: GENERAL PRACTICE

## 2023-05-26 PROCEDURE — 85379 FIBRIN DEGRADATION QUANT: CPT | Performed by: INTERNAL MEDICINE

## 2023-05-26 PROCEDURE — 25000003 PHARM REV CODE 250: Performed by: INTERNAL MEDICINE

## 2023-05-26 PROCEDURE — 93005 ELECTROCARDIOGRAM TRACING: CPT | Performed by: GENERAL PRACTICE

## 2023-05-26 PROCEDURE — 94799 UNLISTED PULMONARY SVC/PX: CPT

## 2023-05-26 PROCEDURE — 25000242 PHARM REV CODE 250 ALT 637 W/ HCPCS: Performed by: INTERNAL MEDICINE

## 2023-05-26 PROCEDURE — 93306 ECHO (CUPID ONLY): ICD-10-PCS | Mod: 26,,, | Performed by: GENERAL PRACTICE

## 2023-05-26 PROCEDURE — 94640 AIRWAY INHALATION TREATMENT: CPT

## 2023-05-26 PROCEDURE — 63600175 PHARM REV CODE 636 W HCPCS

## 2023-05-26 PROCEDURE — 93306 TTE W/DOPPLER COMPLETE: CPT

## 2023-05-26 PROCEDURE — 83735 ASSAY OF MAGNESIUM: CPT | Performed by: INTERNAL MEDICINE

## 2023-05-26 PROCEDURE — 99223 1ST HOSP IP/OBS HIGH 75: CPT | Mod: ,,, | Performed by: INTERNAL MEDICINE

## 2023-05-26 PROCEDURE — 20000000 HC ICU ROOM

## 2023-05-26 PROCEDURE — 27000221 HC OXYGEN, UP TO 24 HOURS

## 2023-05-26 PROCEDURE — 99291 CRITICAL CARE FIRST HOUR: CPT | Mod: ,,, | Performed by: INTERNAL MEDICINE

## 2023-05-26 PROCEDURE — 93010 ELECTROCARDIOGRAM REPORT: CPT | Mod: ,,, | Performed by: GENERAL PRACTICE

## 2023-05-26 PROCEDURE — 84145 PROCALCITONIN (PCT): CPT | Performed by: INTERNAL MEDICINE

## 2023-05-26 PROCEDURE — C9113 INJ PANTOPRAZOLE SODIUM, VIA: HCPCS | Performed by: INTERNAL MEDICINE

## 2023-05-26 PROCEDURE — 85730 THROMBOPLASTIN TIME PARTIAL: CPT | Performed by: INTERNAL MEDICINE

## 2023-05-26 PROCEDURE — 25000003 PHARM REV CODE 250

## 2023-05-26 PROCEDURE — 85027 COMPLETE CBC AUTOMATED: CPT | Performed by: INTERNAL MEDICINE

## 2023-05-26 PROCEDURE — 25000003 PHARM REV CODE 250: Performed by: FAMILY MEDICINE

## 2023-05-26 PROCEDURE — 82962 GLUCOSE BLOOD TEST: CPT

## 2023-05-26 RX ORDER — MORPHINE SULFATE 2 MG/ML
2 INJECTION, SOLUTION INTRAMUSCULAR; INTRAVENOUS
Status: DISCONTINUED | OUTPATIENT
Start: 2023-05-26 | End: 2023-06-01 | Stop reason: HOSPADM

## 2023-05-26 RX ORDER — HEPARIN SODIUM,PORCINE/D5W 25000/250
0-40 INTRAVENOUS SOLUTION INTRAVENOUS CONTINUOUS
Status: DISCONTINUED | OUTPATIENT
Start: 2023-05-26 | End: 2023-05-26

## 2023-05-26 RX ORDER — FUROSEMIDE 10 MG/ML
40 INJECTION INTRAMUSCULAR; INTRAVENOUS ONCE
Status: COMPLETED | OUTPATIENT
Start: 2023-05-26 | End: 2023-05-26

## 2023-05-26 RX ORDER — IPRATROPIUM BROMIDE AND ALBUTEROL SULFATE 2.5; .5 MG/3ML; MG/3ML
3 SOLUTION RESPIRATORY (INHALATION) EVERY 6 HOURS
Status: DISCONTINUED | OUTPATIENT
Start: 2023-05-26 | End: 2023-05-30

## 2023-05-26 RX ORDER — MORPHINE SULFATE 4 MG/ML
4 INJECTION, SOLUTION INTRAMUSCULAR; INTRAVENOUS EVERY 6 HOURS PRN
Status: DISCONTINUED | OUTPATIENT
Start: 2023-05-26 | End: 2023-05-26

## 2023-05-26 RX ORDER — HYDRALAZINE HYDROCHLORIDE 20 MG/ML
10 INJECTION INTRAMUSCULAR; INTRAVENOUS EVERY 4 HOURS PRN
Status: DISCONTINUED | OUTPATIENT
Start: 2023-05-26 | End: 2023-06-01 | Stop reason: HOSPADM

## 2023-05-26 RX ORDER — NITROGLYCERIN 20 MG/100ML
0-400 INJECTION INTRAVENOUS CONTINUOUS
Status: DISCONTINUED | OUTPATIENT
Start: 2023-05-26 | End: 2023-05-27

## 2023-05-26 RX ORDER — HYDRALAZINE HYDROCHLORIDE 20 MG/ML
10 INJECTION INTRAMUSCULAR; INTRAVENOUS ONCE
Status: COMPLETED | OUTPATIENT
Start: 2023-05-26 | End: 2023-05-26

## 2023-05-26 RX ORDER — CHLORHEXIDINE GLUCONATE ORAL RINSE 1.2 MG/ML
15 SOLUTION DENTAL 2 TIMES DAILY
Status: COMPLETED | OUTPATIENT
Start: 2023-05-26 | End: 2023-05-30

## 2023-05-26 RX ORDER — PANTOPRAZOLE SODIUM 40 MG/10ML
40 INJECTION, POWDER, LYOPHILIZED, FOR SOLUTION INTRAVENOUS DAILY
Status: DISCONTINUED | OUTPATIENT
Start: 2023-05-26 | End: 2023-05-28

## 2023-05-26 RX ORDER — MORPHINE SULFATE 4 MG/ML
INJECTION, SOLUTION INTRAMUSCULAR; INTRAVENOUS
Status: COMPLETED
Start: 2023-05-26 | End: 2023-05-26

## 2023-05-26 RX ORDER — DEXMEDETOMIDINE HYDROCHLORIDE 4 UG/ML
0-1.4 INJECTION, SOLUTION INTRAVENOUS CONTINUOUS
Status: DISCONTINUED | OUTPATIENT
Start: 2023-05-26 | End: 2023-05-27

## 2023-05-26 RX ORDER — NAPROXEN SODIUM 220 MG/1
81 TABLET, FILM COATED ORAL DAILY
Status: DISCONTINUED | OUTPATIENT
Start: 2023-05-27 | End: 2023-06-01 | Stop reason: HOSPADM

## 2023-05-26 RX ORDER — BALSAM PERU/CASTOR OIL
OINTMENT (GRAM) TOPICAL 2 TIMES DAILY
Status: DISCONTINUED | OUTPATIENT
Start: 2023-05-26 | End: 2023-05-31

## 2023-05-26 RX ORDER — DEXMEDETOMIDINE HYDROCHLORIDE 4 UG/ML
INJECTION, SOLUTION INTRAVENOUS
Status: COMPLETED
Start: 2023-05-26 | End: 2023-05-26

## 2023-05-26 RX ORDER — HEPARIN SODIUM,PORCINE/D5W 25000/250
0-40 INTRAVENOUS SOLUTION INTRAVENOUS CONTINUOUS
Status: DISCONTINUED | OUTPATIENT
Start: 2023-05-26 | End: 2023-05-28

## 2023-05-26 RX ORDER — HYDRALAZINE HYDROCHLORIDE 25 MG/1
50 TABLET, FILM COATED ORAL EVERY 12 HOURS
Status: DISCONTINUED | OUTPATIENT
Start: 2023-05-26 | End: 2023-05-27

## 2023-05-26 RX ORDER — MUPIROCIN 20 MG/G
OINTMENT TOPICAL 2 TIMES DAILY
Status: COMPLETED | OUTPATIENT
Start: 2023-05-26 | End: 2023-05-30

## 2023-05-26 RX ADMIN — CASTOR OIL AND BALSAM, PERU: 788; 87 OINTMENT TOPICAL at 02:05

## 2023-05-26 RX ADMIN — HYDRALAZINE HYDROCHLORIDE 50 MG: 25 TABLET ORAL at 08:05

## 2023-05-26 RX ADMIN — TICAGRELOR 90 MG: 90 TABLET ORAL at 08:05

## 2023-05-26 RX ADMIN — RANOLAZINE 500 MG: 500 TABLET, EXTENDED RELEASE ORAL at 08:05

## 2023-05-26 RX ADMIN — LEVOFLOXACIN 250 MG: 250 INJECTION, SOLUTION INTRAVENOUS at 10:05

## 2023-05-26 RX ADMIN — FUROSEMIDE 40 MG: 10 INJECTION, SOLUTION INTRAMUSCULAR; INTRAVENOUS at 10:05

## 2023-05-26 RX ADMIN — CEFTRIAXONE SODIUM 1 G: 1 INJECTION, POWDER, FOR SOLUTION INTRAMUSCULAR; INTRAVENOUS at 07:05

## 2023-05-26 RX ADMIN — MORPHINE SULFATE 2 MG: 2 INJECTION, SOLUTION INTRAMUSCULAR; INTRAVENOUS at 02:05

## 2023-05-26 RX ADMIN — MORPHINE SULFATE 2 MG: 2 INJECTION, SOLUTION INTRAMUSCULAR; INTRAVENOUS at 04:05

## 2023-05-26 RX ADMIN — HEPARIN SODIUM 12 UNITS/KG/HR: 10000 INJECTION, SOLUTION INTRAVENOUS at 11:05

## 2023-05-26 RX ADMIN — MUPIROCIN 1 G: 20 OINTMENT TOPICAL at 08:05

## 2023-05-26 RX ADMIN — PANTOPRAZOLE SODIUM 40 MG: 40 INJECTION, POWDER, FOR SOLUTION INTRAVENOUS at 08:05

## 2023-05-26 RX ADMIN — DEXMEDETOMIDINE HYDROCHLORIDE 0.2 MCG/KG/HR: 4 INJECTION, SOLUTION INTRAVENOUS at 10:05

## 2023-05-26 RX ADMIN — MUPIROCIN 1 G: 20 OINTMENT TOPICAL at 10:05

## 2023-05-26 RX ADMIN — ASPIRIN 81 MG: 81 TABLET, COATED ORAL at 08:05

## 2023-05-26 RX ADMIN — METOPROLOL TARTRATE 50 MG: 50 TABLET, FILM COATED ORAL at 01:05

## 2023-05-26 RX ADMIN — MORPHINE SULFATE 2 MG: 2 INJECTION, SOLUTION INTRAMUSCULAR; INTRAVENOUS at 06:05

## 2023-05-26 RX ADMIN — ALLOPURINOL 50 MG: 100 TABLET ORAL at 08:05

## 2023-05-26 RX ADMIN — ATORVASTATIN CALCIUM 40 MG: 40 TABLET, FILM COATED ORAL at 08:05

## 2023-05-26 RX ADMIN — HYDRALAZINE HYDROCHLORIDE 10 MG: 20 INJECTION INTRAMUSCULAR; INTRAVENOUS at 05:05

## 2023-05-26 RX ADMIN — METHYLPREDNISOLONE SODIUM SUCCINATE 40 MG: 40 INJECTION, POWDER, FOR SOLUTION INTRAMUSCULAR; INTRAVENOUS at 01:05

## 2023-05-26 RX ADMIN — MORPHINE SULFATE 4 MG: 4 INJECTION, SOLUTION INTRAMUSCULAR; INTRAVENOUS at 10:05

## 2023-05-26 RX ADMIN — IPRATROPIUM BROMIDE AND ALBUTEROL SULFATE 3 ML: .5; 3 SOLUTION RESPIRATORY (INHALATION) at 12:05

## 2023-05-26 RX ADMIN — DEXMEDETOMIDINE HYDROCHLORIDE 0.6 MCG/KG/HR: 4 INJECTION, SOLUTION INTRAVENOUS at 10:05

## 2023-05-26 RX ADMIN — MORPHINE SULFATE 2 MG: 2 INJECTION, SOLUTION INTRAMUSCULAR; INTRAVENOUS at 05:05

## 2023-05-26 RX ADMIN — HYDRALAZINE HYDROCHLORIDE 10 MG: 20 INJECTION INTRAMUSCULAR; INTRAVENOUS at 09:05

## 2023-05-26 RX ADMIN — NITROGLYCERIN 5 MCG/MIN: 20 INJECTION INTRAVENOUS at 09:05

## 2023-05-26 RX ADMIN — HYDRALAZINE HYDROCHLORIDE 50 MG: 25 TABLET ORAL at 06:05

## 2023-05-26 RX ADMIN — IPRATROPIUM BROMIDE AND ALBUTEROL SULFATE 3 ML: .5; 3 SOLUTION RESPIRATORY (INHALATION) at 07:05

## 2023-05-26 RX ADMIN — METHYLPREDNISOLONE SODIUM SUCCINATE 40 MG: 40 INJECTION, POWDER, FOR SOLUTION INTRAMUSCULAR; INTRAVENOUS at 06:05

## 2023-05-26 RX ADMIN — CASTOR OIL AND BALSAM, PERU: 788; 87 OINTMENT TOPICAL at 08:05

## 2023-05-26 RX ADMIN — METOPROLOL TARTRATE 50 MG: 50 TABLET, FILM COATED ORAL at 08:05

## 2023-05-26 RX ADMIN — CHLORHEXIDINE GLUCONATE 15 ML: 1.2 RINSE ORAL at 10:05

## 2023-05-26 RX ADMIN — METHYLPREDNISOLONE SODIUM SUCCINATE 40 MG: 40 INJECTION, POWDER, FOR SOLUTION INTRAMUSCULAR; INTRAVENOUS at 10:05

## 2023-05-26 RX ADMIN — HEPARIN SODIUM 5000 UNITS: 5000 INJECTION INTRAVENOUS; SUBCUTANEOUS at 06:05

## 2023-05-26 RX ADMIN — DILTIAZEM HYDROCHLORIDE 120 MG: 120 CAPSULE, COATED, EXTENDED RELEASE ORAL at 08:05

## 2023-05-26 RX ADMIN — CHLORHEXIDINE GLUCONATE 15 ML: 1.2 RINSE ORAL at 08:05

## 2023-05-26 RX ADMIN — IPRATROPIUM BROMIDE AND ALBUTEROL SULFATE 3 ML: .5; 3 SOLUTION RESPIRATORY (INHALATION) at 08:05

## 2023-05-26 NOTE — PLAN OF CARE
Problem: Oral Intake Inadequate  Goal: Improved Oral Intake  Outcome: Ongoing, Not Progressing

## 2023-05-26 NOTE — NURSING
Pt transferred to room on BiPAP. AAOx3, in good spirit. Denies any pain or discomfort. Notified hospitalist of elevated Troponin, pt asymptomatic. Troponin ordered for the morning.

## 2023-05-26 NOTE — NURSING
[facility]  Wound Care    Patient Name:  Marisol Kerr   MRN:  [id number]  Date: 2023  Diagnosis: Shortness of breath    History:     Past Medical History:   Diagnosis Date    CAD (coronary artery disease)     Cancer     colon    CHF (congestive heart failure)     Colitis     Colon cancer     COPD (chronic obstructive pulmonary disease)     Decreased hearing     Diabetes mellitus     Diabetes mellitus, type 2     Hypercholesterolemia     Hypertension     Hypoxemia     Insomnia     Obesity     Osteoarthritis        Social History     Socioeconomic History    Marital status:    Tobacco Use    Smoking status: Former     Packs/day: 3.00     Years: 50.00     Pack years: 150.00     Types: Cigarettes     Start date: 3/30/1964     Quit date: 2006     Years since quittin.2    Smokeless tobacco: Never    Tobacco comments:     ex smoker 15 years   Substance and Sexual Activity    Alcohol use: Yes    Drug use: No    Sexual activity: Never     Social Determinants of Health     Financial Resource Strain: Low Risk     Difficulty of Paying Living Expenses: Not hard at all   Food Insecurity: No Food Insecurity    Worried About Running Out of Food in the Last Year: Never true    Ran Out of Food in the Last Year: Never true   Transportation Needs: No Transportation Needs    Lack of Transportation (Medical): No    Lack of Transportation (Non-Medical): No   Physical Activity: Inactive    Days of Exercise per Week: 0 days    Minutes of Exercise per Session: 0 min   Stress: Stress Concern Present    Feeling of Stress : To some extent   Social Connections: Socially Isolated    Frequency of Communication with Friends and Family: More than three times a week    Frequency of Social Gatherings with Friends and Family: More than three times a week    Attends Rastafari Services: Never    Active Member of Clubs or Organizations: No    Attends Club or Organization Meetings: Never    Marital Status:     Housing Stability: Low Risk     Unable to Pay for Housing in the Last Year: No    Number of Places Lived in the Last Year: 1    Unstable Housing in the Last Year: No       Precautions:     Allergies as of 05/25/2023 - Reviewed 05/25/2023   Allergen Reaction Noted    Iodinated contrast media  06/15/2015    Strawberries [strawberry] Hives and Itching 08/22/2016       [facility] Assessment Details/Treatment   05/26/2023  77 yr old female  awake and alert  has bi pap on  in bed   Having a hard time keeping a seal on the mask  bridge of the nose is red there is a gel seal in place will work with resp. on that   Sacral stage 1 to the sacral/coccyx right buttock  [id number]     Bilat lower legs swollen and red     Recommendation:   scaral/coccyx/buttock  Clean area with chlorhexidine/ns  Pat dry  Apply venelex and cover with mepilex.  Pressure Prevention Protocol  Turn reposition q2 as pt's condition will allow.  Calmoseptine skin barrier to the buttocks as needed.  Float and elevate heels of bed with pillows.  Border dressing to the sacral and buttock.  Bilateral heel pillows as needed   air mattress

## 2023-05-26 NOTE — H&P
[facility] - Emergency Dept    History & Physical      Patient Name: Marisol Kerr  MRN: [id number]  Admission Date: 5/25/2023  Attending Physician: Vamshi Young MD   Primary Care Provider: Khadar Dwyer MD         Patient information was obtained from patient and ER records.     Subjective:     Principal Problem:Shortness of breath    Chief Complaint:   Chief Complaint   Patient presents with    Shortness of Breath        HPI:  77-year-old female history of multivessel CAD with stent on DAPT, very severe COPD, chronic diastolic CHF, chronic hypoxemic respiratory failure on oxygen, obesity, CKD 3, history of cholangitis status post ERCP with sphincterotomy and bile duct stone removal, type 2 diabetes.     Presents to the ER shortness of breath, saturation 78% with paramedics.  Given oxygen DuoNeb EN route.  Has had multiple admissions for respiratory problems in the past.    Past Medical History:   Diagnosis Date    CAD (coronary artery disease)     Cancer     colon    CHF (congestive heart failure)     Colitis     Colon cancer     COPD (chronic obstructive pulmonary disease)     Decreased hearing     Diabetes mellitus     Diabetes mellitus, type 2     Hypercholesterolemia     Hypertension     Hypoxemia     Insomnia     Obesity     Osteoarthritis        Past Surgical History:   Procedure Laterality Date    ANGIOGRAM, CORONARY, WITH LEFT HEART CATHETERIZATION N/A 2/10/2022    Procedure: Angiogram, Coronary, with Left Heart Cath;  Surgeon: Cristian Benjamin MD;  Location: [facility] CATH/EP LAB;  Service: Cardiology;  Laterality: N/A;    CARDIAC CATHETERIZATION      CHOLECYSTECTOMY      COLECTOMY      CORONARY ANGIOPLASTY      CORONARY STENT PLACEMENT      ERCP N/A 1/16/2023    Procedure: ERCP (ENDOSCOPIC RETROGRADE CHOLANGIOPANCREATOGRAPHY);  Surgeon: Biju Kramer III, MD;  Location: [facility] ENDO;  Service: Endoscopy;  Laterality: N/A;    ESOPHAGOGASTRODUODENOSCOPY N/A 1/29/2023    Procedure:  EGD (ESOPHAGOGASTRODUODENOSCOPY);  Surgeon: Aarti Alvarez MD;  Location: [facility];  Service: Endoscopy;  Laterality: N/A;    EXTERNAL EAR SURGERY      EYE SURGERY      FLEXIBLE SIGMOIDOSCOPY N/A 9/19/2019    Procedure: SIGMOIDOSCOPY, FLEXIBLE;  Surgeon: Biuj Kramer III, MD;  Location: [facility] ENDO;  Service: Endoscopy;  Laterality: N/A;    HYSTERECTOMY      partial       Review of patient's allergies indicates:   Allergen Reactions    Iodinated contrast media     Strawberries [strawberry] Hives and Itching       No current facility-administered medications on file prior to encounter.     Current Outpatient Medications on File Prior to Encounter   Medication Sig    albuterol (VENTOLIN HFA) 90 mcg/actuation inhaler Inhale 2 puffs into the lungs every 6 (six) hours as needed for Wheezing or Shortness of Breath. Rescue    albuterol-ipratropium (DUO-NEB) 2.5 mg-0.5 mg/3 mL nebulizer solution Take 3 mLs by nebulization every 4 (four) hours as needed for Wheezing or Shortness of Breath. Rescue    allopurinoL (ZYLOPRIM) 100 MG tablet Take 0.5 tablets (50 mg total) by mouth once daily.    aspirin (ECOTRIN) 81 MG EC tablet Take 1 tablet (81 mg total) by mouth every morning.    atorvastatin (LIPITOR) 40 MG tablet Take 1 tablet (40 mg total) by mouth once daily.    cholecalciferol, vitamin D3, 125 mcg (5,000 unit) Tab Take 5,000 Units by mouth once daily.    cholestyramine (QUESTRAN) 4 gram packet Take 1 packet (4 g total) by mouth 2 (two) times daily.    coenzyme Q10 100 mg capsule Take 100 mg by mouth every morning.    diltiaZEM (CARDIZEM CD) 120 MG Cp24 Take 1 capsule (120 mg total) by mouth once daily.    ergocalciferol (VITAMIN D2) 50,000 unit Cap Take 1 capsule (50,000 Units total) by mouth every 7 days.    ferrous sulfate 325 (65 FE) MG EC tablet Take 325 mg by mouth once daily.    fish oil-omega-3 fatty acids 300-1,000 mg capsule Take 2 capsules by mouth every morning.    ipratropium-albuteroL (COMBIVENT  RESPIMAT)  mcg/actuation inhaler Inhale 2 puffs into the lungs every 4 (four) hours as needed for Shortness of Breath. Rescue    isosorbide mononitrate (IMDUR) 120 MG 24 hr tablet Take 1 tablet (120 mg total) by mouth once daily.    ondansetron (ZOFRAN-ODT) 4 MG TbDL Take 1 tablet (4 mg total) by mouth every 6 (six) hours as needed (nausea).    pantoprazole (PROTONIX) 40 MG tablet Take 1 tablet (40 mg total) by mouth once daily.    polycarbophil (FIBERCON) 625 mg tablet Take 625 mg by mouth once daily.    ranolazine (RANEXA) 500 MG Tb12 Take 1 tablet (500 mg total) by mouth 2 (two) times daily.    ticagrelor (BRILINTA) 90 mg tablet Take 1 tablet (90 mg total) by mouth every 12 (twelve) hours.    umeclidinium (INCRUSE ELLIPTA) 62.5 mcg/actuation inhalation capsule Inhale 62.5 mcg into the lungs every morning. Controller    vit C/E/Zn/coppr/lutein/zeaxan (PRESERVISION AREDS-2 ORAL) Take 1 tablet by mouth once daily.    furosemide (LASIX) 40 MG tablet Take 1 tablet (40 mg total) by mouth once daily.    hydrALAZINE (APRESOLINE) 50 MG tablet Take 1 tablet (50 mg total) by mouth every 12 (twelve) hours.    metoprolol tartrate (LOPRESSOR) 50 MG tablet Take 1 tablet (50 mg total) by mouth 2 (two) times daily.    mupirocin (BACTROBAN) 2 % ointment Apply topically 2 (two) times daily.    nebulizer and compressor Reshma as directed    nitroGLYCERIN 0.4 MG/DOSE TL SPRY (NITROLINGUAL) 400 mcg/spray spray Place 1 spray under the tongue every 5 (five) minutes as needed for Chest pain (max of 3 doses).    [DISCONTINUED] benazepriL (LOTENSIN) 40 MG tablet Take 1 tablet (40 mg total) by mouth once daily.    [DISCONTINUED] cimetidine (TAGAMET) 300 MG tablet Take 1 tablet (300 mg total) by mouth every 6 (six) hours. Take 1 tablet (300 mg total) by mouth starting at 6 PM on Sunday April 17, 2022 (the evening before your angiogram procedure). Then take 1 tablet at 12 AM, then take 1 tablet at 6 AM on Monday April 18th.     [DISCONTINUED] lisinopriL 10 MG tablet Take 1 tablet (10 mg total) by mouth once daily. HOLD UNTIL OTHERWISE DIRECTED BY PRIMARY CARE PROVIDER    [DISCONTINUED] lovastatin (MEVACOR) 40 MG tablet Take 1 tablet (40 mg total) by mouth every other day.     Family History       Problem Relation (Age of Onset)    Febrile seizures Mother    Heart failure Father          Tobacco Use    Smoking status: Former     Packs/day: 3.00     Years: 50.00     Pack years: 150.00     Types: Cigarettes     Start date: 3/30/1964     Quit date: 2006     Years since quittin.2    Smokeless tobacco: Never    Tobacco comments:     ex smoker 15 years   Substance and Sexual Activity    Alcohol use: Yes    Drug use: No    Sexual activity: Never     Review of Systems  Objective:     Vital Signs (Most Recent):  Temp: 99.1 °F (37.3 °C) (23 1649)  Pulse: 88 (23)  Resp: (!) 23 (23 1632)  BP: (!) 98/51 (23)  SpO2: 99 % (23) Vital Signs (24h Range):  Temp:  [99.1 °F (37.3 °C)] 99.1 °F (37.3 °C)  Pulse:  [88-96] 88  Resp:  [23] 23  SpO2:  [85 %-100 %] 99 %  BP: ()/(51-74) 98/51     Weight: 77.1 kg (170 lb)  Body mass index is 31.09 kg/m².    Physical Exam    Nursing note and vitals reviewed.  Constitutional: She is not diaphoretic.  Confused and difficult to arouse.  On BiPAP.  HENT:   Head: Normocephalic and atraumatic.   Eyes: Conjunctivae are normal.   Neck:   Normal range of motion.  Cardiovascular:  Normal rate.           Pulmonary/Chest:   Poor air movement with diffuse inspiratory and expiratory rhonchi.  Positive JVD.   Abdominal: Abdomen is soft. There is no abdominal tenderness.   Musculoskeletal:         General: Normal range of motion.      Cervical back: Normal range of motion.      Neurological: She is alert. No sensory deficit.   Moves all extremities equally   Skin: No rash noted.   Psychiatric:   On initial examination patient has difficulty answer questions due to shortness of  breath and respiratory distress.        Significant Labs: All pertinent labs within the past 24 hours have been reviewed.    Significant Imaging: I have reviewed all pertinent imaging results/findings within the past 24 hours.    Assessment/Plan:     Active Diagnoses:    Diagnosis Date Noted POA    PRINCIPAL PROBLEM:  Shortness of breath [R06.02] 01/28/2023 Unknown      Problems Resolved During this Admission:     VTE Risk Mitigation (From admission, onward)           Ordered     IP VTE HIGH RISK PATIENT  Once         05/25/23 1814     Place sequential compression device  Until discontinued         05/25/23 1814                  Acute hypoxemic and hypercapnic respiratory failure, likely secondary to CHF, very severe COPD, home O2  -continue BiPAP  -Lasix IV 40mg QD  -Cardiology, Pulmonary consult  -CT chest  -for admissions for the same    History of multivessel CAD with stent  Abnormal EKG with ST depressions  - trend troponins  -Continue aspirin and Brilinta    Metabolic encephalopathy    CKD 3-monitor ins and outs, avoid nephrotoxic agents    [id number]     HSQ  Restart home medications as ordered  Consider palliative care    Vamshi Young MD  Department of Hospital Medicine   [facility] - Emergency Dept

## 2023-05-26 NOTE — ED PROVIDER NOTES
Encounter Date: 5/25/2023       History     Chief Complaint   Patient presents with    Shortness of Breath     Patient with history of chronic renal sufficiency and congestive heart failure.  Patient with similar presentations in the past.  Patient reportedly with increasing shortness of breath.  O2 saturation 78% with paramedics.  Patient was given oxygen and DuoNeb EN route.  There is no chest pain.  No fever chills.  This is similar to previous congestive heart failure exacerbation.    Review of patient's allergies indicates:   Allergen Reactions    Iodinated contrast media     Strawberries [strawberry] Hives and Itching     Past Medical History:   Diagnosis Date    CAD (coronary artery disease)     Cancer     colon    CHF (congestive heart failure)     Colitis     Colon cancer     COPD (chronic obstructive pulmonary disease)     Decreased hearing     Diabetes mellitus     Diabetes mellitus, type 2     Hypercholesterolemia     Hypertension     Hypoxemia     Insomnia     Obesity     Osteoarthritis      Past Surgical History:   Procedure Laterality Date    ANGIOGRAM, CORONARY, WITH LEFT HEART CATHETERIZATION N/A 2/10/2022    Procedure: Angiogram, Coronary, with Left Heart Cath;  Surgeon: Cristian Benjamin MD;  Location: [facility] CATH/EP LAB;  Service: Cardiology;  Laterality: N/A;    CARDIAC CATHETERIZATION      CHOLECYSTECTOMY      COLECTOMY      CORONARY ANGIOPLASTY      CORONARY STENT PLACEMENT      ERCP N/A 1/16/2023    Procedure: ERCP (ENDOSCOPIC RETROGRADE CHOLANGIOPANCREATOGRAPHY);  Surgeon: Biju Kramer III, MD;  Location: [facility];  Service: Endoscopy;  Laterality: N/A;    ESOPHAGOGASTRODUODENOSCOPY N/A 1/29/2023    Procedure: EGD (ESOPHAGOGASTRODUODENOSCOPY);  Surgeon: Aarti Alvarez MD;  Location: [facility];  Service: Endoscopy;  Laterality: N/A;    EXTERNAL EAR SURGERY      EYE SURGERY      FLEXIBLE SIGMOIDOSCOPY N/A 9/19/2019    Procedure: SIGMOIDOSCOPY, FLEXIBLE;  Surgeon: Biju MURPHY  Nia RIBEIRO MD;  Location: [facility];  Service: Endoscopy;  Laterality: N/A;    HYSTERECTOMY      partial     Family History   Problem Relation Age of Onset    Febrile seizures Mother     Heart failure Father      Social History     Tobacco Use    Smoking status: Former     Packs/day: 3.00     Years: 50.00     Pack years: 150.00     Types: Cigarettes     Start date: 3/30/1964     Quit date: 2006     Years since quittin.2    Smokeless tobacco: Never    Tobacco comments:     ex smoker 15 years   Substance Use Topics    Alcohol use: Yes    Drug use: No     Review of Systems   Constitutional:  Negative for chills and fever.   HENT:  Positive for congestion.    Respiratory:  Positive for shortness of breath.    Cardiovascular:  Negative for chest pain and palpitations.   Gastrointestinal:  Negative for abdominal pain and vomiting.   Genitourinary:  Negative for dysuria.   Musculoskeletal:  Negative for joint swelling.   Neurological:  Negative for headaches.   Psychiatric/Behavioral:  Negative for agitation.      Physical Exam     Initial Vitals   BP Pulse Resp Temp SpO2   23 1649 23 1627 23 1627 23 1649 23 1609   111/74 95 (!) 23 99.1 °F (37.3 °C) (!) 85 %      MAP       --                Physical Exam    Nursing note and vitals reviewed.  Constitutional: She is not diaphoretic. No distress.   HENT:   Head: Normocephalic and atraumatic.   Eyes: Conjunctivae are normal.   Neck:   Normal range of motion.  Cardiovascular:  Normal rate.           Pulmonary/Chest:   Poor air movement with diffuse inspiratory and expiratory rhonchi.  Positive JVD.   Abdominal: Abdomen is soft. There is no abdominal tenderness.   Musculoskeletal:         General: Normal range of motion.      Cervical back: Normal range of motion.     Neurological: She is alert. No sensory deficit.   Moves all extremities equally   Skin: No rash noted.   Psychiatric:   On initial examination patient has difficulty  answer questions due to shortness of breath and respiratory distress.       ED Course   Critical Care    Date/Time: 5/25/2023 7:40 PM  Performed by: Biju Schwab MD  Authorized by: Vamshi Young MD   Direct patient critical care time: 15 minutes  Additional history critical care time: 5 minutes  Ordering / reviewing critical care time: 5 minutes  Documentation critical care time: 5 minutes  Consulting other physicians critical care time: 5 minutes  Total critical care time (exclusive of procedural time) : 35 minutes      Labs Reviewed   CBC W/ AUTO DIFFERENTIAL - Abnormal; Notable for the following components:       Result Value    Hemoglobin 11.0 (*)     MCH 26.8 (*)     MCHC 29.6 (*)     RDW 17.1 (*)     Gran # (ANC) 9.1 (*)     Immature Grans (Abs) 0.05 (*)     Gran % 77.0 (*)     Lymph % 17.0 (*)     All other components within normal limits   COMPREHENSIVE METABOLIC PANEL - Abnormal; Notable for the following components:    Glucose 155 (*)     BUN 25 (*)     Creatinine 1.8 (*)     Calcium 8.4 (*)     Albumin 3.1 (*)     eGFR 29.0 (*)     All other components within normal limits   B-TYPE NATRIURETIC PEPTIDE - Abnormal; Notable for the following components:     (*)     All other components within normal limits   TROPONIN I HIGH SENSITIVITY - Abnormal; Notable for the following components:    Troponin I High Sensitivity 159.7 (*)     All other components within normal limits    Narrative:     Troponin critical result(s) called and verbal readback obtained from   [facility]  by MS1 05/25/2023 19:30   ISTAT PROCEDURE - Abnormal; Notable for the following components:    POC PH 7.287 (*)     POC PCO2 69.8 (*)     POC PO2 437 (*)     POC HCO3 33.3 (*)     POC TCO2 35 (*)     All other components within normal limits   ISTAT PROCEDURE - Abnormal; Notable for the following components:    POC PCO2 51.7 (*)     POC PO2 67 (*)     POC HCO3 31.8 (*)     POC SATURATED O2 93 (*)     POC TCO2 33 (*)      All other components within normal limits   PROTIME-INR   APTT   MAGNESIUM   TROPONIN I HIGH SENSITIVITY   SARS-COV-2 RNA AMPLIFICATION, QUAL        ECG Results              EKG 12-lead (In process)  Result time 05/25/23 16:26:34      In process by Interface, Lab In [facility] (05/25/23 16:26:34)                   Narrative:    Test Reason : R07.9,    Vent. Rate : 102 BPM     Atrial Rate : 102 BPM     P-R Int : 194 ms          QRS Dur : 102 ms      QT Int : 348 ms       P-R-T Axes : 077 066 097 degrees     QTc Int : 453 ms    Sinus tachycardia  Nonspecific ST and T wave abnormality  Abnormal ECG  When compared with ECG of 27-JAN-2023 10:17,  ST now depressed in Lateral leads    Referred By: [email or web address]   SELF           Confirmed By:                                   Imaging Results              CT Chest Without Contrast (In process)                      X-Ray Chest AP Portable (Final result)  Result time 05/25/23 16:13:51      Final result by Antonieta Fonseca DO (05/25/23 16:13:51)                   Narrative:    PA and lateral chest radiograph: 5/25/2023 4:13 PM CDT    Indication: 77 years  old Female with Chest Pain.    Comparison: 2/2/2023    Findings: The cardiomediastinal silhouette is enlarged.    There are bilateral airspace opacities with interstitial and central vascular prominence.    No pneumothorax is seen.    There is blunting of both costophrenic angles, suggestive of trace effusions.    Impression: There are bilateral airspace opacities with interstitial and central vascular prominence. These findings may reflect evidence of edema.    Electronically signed by:  Antonieta Fonseca DO  5/25/2023 4:13 PM CDT Workstation: [id number]                                     Medications   sodium chloride 0.9% flush 10 mL (has no administration in time range)   naloxone 0.4 mg/mL injection 0.02 mg (has no administration in time range)   glucose chewable tablet 16 g (has no administration in time range)   glucose chewable  tablet 24 g (has no administration in time range)   dextrose 50% injection 12.5 g (has no administration in time range)   dextrose 50% injection 25 g (has no administration in time range)   glucagon (human recombinant) injection 1 mg (has no administration in time range)   potassium bicarbonate disintegrating tablet 50 mEq (has no administration in time range)   potassium bicarbonate disintegrating tablet 35 mEq (has no administration in time range)   potassium bicarbonate disintegrating tablet 60 mEq (has no administration in time range)   aspirin tablet 325 mg (325 mg Oral Given 5/25/23 1619)   furosemide injection 80 mg (80 mg Intravenous Given 5/25/23 1619)   nitroGLYCERIN 2% TD oint ointment 1 inch (1 inch Topical (Top) Given 5/25/23 1618)     Medical Decision Making:   History:   I obtained history from: EMS provider.       <> Summary of History: Oxygen DuoNeb with paramedics  Old Medical Records: I decided to obtain old medical records.  Old Records Summarized: records from clinic visits and records from previous admission(s).       <> Summary of Records: Similar symptoms with congestive heart failure in the past  Differential Diagnosis:   Pneumonia, pulmonary edema, acute coronary syndrome  Independently Interpreted Test(s):   I have ordered and independently interpreted X-rays - see summary below.       <> Summary of X-Ray Reading(s): Bilateral pulmonary edema  I have ordered and independently interpreted EKG Reading(s) - see summary below       <> Summary of EKG Reading(s): Sinus tachycardia with ventricular rate of 102.  Nonspecific ST segment changes.  Clinical Tests:   Lab Tests: Reviewed  Radiological Study: Reviewed  Medical Tests: Reviewed  ED Management:  Patient presents with respiratory distress.  Patient is in pulmonary edema with respiratory distress.  Eighty of Lasix IV and nitrates given.  Patient put on BiPAP with rapid improvement.  Patient now alert and able to give much better history.   Hospitalist consulted for admission.                        Clinical Impression:   Final diagnoses:  [R07.9] Chest pain  [I50.9] CHF (congestive heart failure)  [J96.21] Acute on chronic respiratory failure with hypoxia (Primary)        ED Disposition Condition    Admit                 Biju Schwab MD  05/25/23 1944

## 2023-05-26 NOTE — CONSULTS
"Consult noted; chart reviewed. Pt is a FULL CODE; no ACP docs noted. Pt lying in bed with BIPAP on. Very hard of hearing. AAOX3. Denies pain. No family members currently at the bedside. Pt reports came from home alone. Is not ; has 1 daughter also named Marisol.     Advance Care Planning     Date: 05/26/2023    [facility]  I engaged the patient in a conversation about advance care planning and we specifically addressed what the goals of care would be moving forward, in light of the patient's change in clinical status, specifically Severe COPD.  We did not specifically address the patient's likely prognosis.  We explored the patient's values and preferences for future care.  The patient endorses that what is most important right now is to focus on extending life as long as possible, even it it means sacrificing quality.     Accordingly, we have decided that the best plan to meet the patient's goals includes continuing with treatment.     I did not explain the role for hospice care at this stage of the patient's illness, including its ability to help the patient live with the best quality of life possible.  We will not be making a hospice referral.    I explained the difference between Full code Vs. DNR. The pt expressed that she wants to "Fight for her Life." She wishes to remain a FULL CODE at this time. However, she reports that she would not want to live on machines indefinitely. "At that point let me go."     I spent a total of 30 minutes engaging the patient in this advance care planning discussion.      Called Daughter Marisol [phone]. Introduced Palliative care as an extra layer of support for our patients and families dealing with chronic illnesses. I educated Daughter at length about BIPAP being a non-invasive mechanical ventilator and if pt's respiratory status continues to decline the next step in treatment would be intubation. Ms. Damon is well aware of her Mother's tenuous medical condition and they have " had many discussions regarding her wishes. Daughter is agreeable to FULL CODE but also understands her Mother would not want to live on machines indefinitely. If/when it comes to that point Ms. Damon knows her Mother would want to be kept comfortable.     Dr. Kiser updated and will follow up on Monday. Thank you for consult. We hope we have been helpful.

## 2023-05-26 NOTE — CONSULTS
"[facility]  Adult Nutrition   Consult Note (Initial Assessment)     SUMMARY     Recommendations   1) Recommend advance to DM cardiac diet as medically feasible.   2) If unable to start diet within 3 days consider enteral nutrition nutrition support Glucerna 1.5 with goal rate of 50 ml/hr, 30 ml H20 flush q 4 hrs. Start feeding at 20 ml/hr and increase by 10 ml q 6 hrs. If unable to place NGT, consider PPN/TPN.   3) RD to monitor intake, weight and labs.  Goals: Advance diet within 3 days and improve po intake or provide nutrition support to meet EEN/EPN.  Nutrition Goal Status: goal not met    Dietitian Rounds Brief  Pt currently NPO, hard of hearing and has bipap.     Diet order:   Current Diet Order: NPO                 Evaluation of Received Nutrient/Fluid Intake  Energy Calories Required: not meeting needs  Protein Required: not meeting needs  Fluid Required: not meeting needs     % Intake of Estimated Energy Needs: 0 - 25 %  % Meal Intake: 0 - 25 %      Intake/Output Summary (Last 24 hours) at 5/26/2023 1506  Last data filed at 5/26/2023 0700  Gross per 24 hour   Intake 100 ml   Output 400 ml   Net -300 ml        Anthropometrics  Temp: 98.1 °F (36.7 °C)  Height Method: Stated  Height: 5' 3.5" (161.3 cm)  Height (inches): 63.5 in  Weight Method: Bed Scale  Weight: 68.8 kg (151 lb 10.8 oz)  Weight (lb): 151.68 lb  Ideal Body Weight (IBW), Female: 117.5 lb  % Ideal Body Weight, Female (lb): 129.09 %  BMI (Calculated): 26.4  BMI Grade: 25 - 29.9 - overweight       Estimated/Assessed Needs  Weight Used For Calorie Calculations: 68.5 kg (151 lb 0.2 oz)  Energy Calorie Requirements (kcal): 5866-7245 kcal (25-30 kcal/ kg bw)  Energy Need Method: Kcal/kg  Protein Requirements: 68-82 g (1.0-1.2 kg/ kg bw)  Weight Used For Protein Calculations: 68.5 kg (151 lb 0.2 oz)     Estimated Fluid Requirement Method: RDA Method  RDA Method (mL): 1712       Reason for Assessment  Reason For Assessment: " consult  Relevant Medical History: SOB, CAD, COPD, CHF, Obesity  Interdisciplinary Rounds: did not attend    Nutrition/Diet History  Food Allergies: NKFA  Factors Affecting Nutritional Intake: NPO    Nutrition Risk Screen  Nutrition Risk Screen: no indicators present       Altered Skin Integrity 05/26/23 0101  medial Coccyx Intact skin with non-blanchable redness of localized area-Wound Image: Images linked  MST Score: 0  Have you recently lost weight without trying?: No  Weight loss score: 0  Have you been eating poorly because of a decreased appetite?: No  Appetite score: 0       Weight History:  Wt Readings from Last 10 Encounters:   05/26/23 68.8 kg (151 lb 10.8 oz)   05/26/23 68.5 kg (151 lb)   04/16/23 73 kg (161 lb)   03/28/23 73 kg (161 lb)   01/30/23 81.4 kg (179 lb 7.3 oz)   01/24/23 83.1 kg (183 lb 3.2 oz)   01/16/23 84.3 kg (185 lb 13.6 oz)   01/17/23 83.9 kg (185 lb)   01/10/23 79.7 kg (175 lb 12.8 oz)   11/03/22 83.5 kg (184 lb)        Lab/Procedures/Meds: Pertinent Labs/Meds Reviewed    Medications:Pertinent Medications Reviewed  Scheduled Meds:   albuterol-ipratropium  3 mL Nebulization Q6H    allopurinoL  50 mg Oral Daily    [START ON 5/27/2023] aspirin  81 mg Oral Daily    atorvastatin  40 mg Oral Daily    balsam peru-castor oiL   Topical (Top) BID    chlorhexidine  15 mL Mouth/Throat BID    diltiaZEM  120 mg Oral Daily    hydrALAZINE  50 mg Oral Q12H    levoFLOXacin  250 mg Intravenous Q24H    methylPREDNISolone sodium succinate injection  40 mg Intravenous Q8H    metoprolol tartrate  50 mg Oral BID    mupirocin   Nasal BID    ranolazine  500 mg Oral BID    ticagrelor  90 mg Oral BID     Continuous Infusions:   dexmedeTOMIDine (Precedex) infusion (titrating) 0.05 mcg/kg/hr (05/26/23 1415)    heparin (porcine) in D5W 12 Units/kg/hr (05/26/23 1145)    nitroGLYCERIN Stopped (05/26/23 1145)     PRN Meds:.dextrose 50%, dextrose 50%, glucagon (human recombinant), glucagon (human recombinant), glucose,  glucose, heparin (PORCINE), heparin (PORCINE), hydrALAZINE, midodrine, morphine, naloxone, potassium bicarbonate, potassium bicarbonate, potassium bicarbonate, sodium chloride 0.9%    Labs: Pertinent Labs Reviewed  Clinical Chemistry:  Recent Labs   Lab 05/26/23  0506      K 4.5   CL 96   CO2 29   *   BUN 29*   CREATININE 1.9*   CALCIUM 8.5*   PROT 6.4   ALBUMIN 2.9*   BILITOT 0.9   ALKPHOS 83   AST 20   ALT 14   ANIONGAP 12   MG 1.9     CBC:   Recent Labs   Lab 05/26/23  0506   WBC 6.43   RBC 3.87*   HGB 10.5*   HCT 34.4*      MCV 89   MCH 27.1   MCHC 30.5*     Lipid Panel:  No results for input(s): CHOL, HDL, LDLCALC, TRIG, CHOLHDL in the last 168 hours.  Cardiac Profile:  Recent Labs   Lab 05/25/23  1603   *     Inflammatory Labs:  No results for input(s): CRP in the last 168 hours.  Diabetes:  No results for input(s): HGBA1C, POCTGLUCOSE in the last 168 hours.  Thyroid & Parathyroid:  No results for input(s): TSH, FREET4, I0YFXBW, N2PGSYM, THYROIDAB in the last 168 hours.    Monitor and Evaluation  Food and Nutrient Intake: energy intake, food and beverage intake  Food and Nutrient Adminstration: diet order     Nutrition Risk  Level of Risk/Frequency of Follow-up: moderate - high     Nutrition Follow-Up  RD Follow-up?: Yes      Rebeca Aguiar, GARCIA 05/26/2023 3:06 PM

## 2023-05-26 NOTE — PROGRESS NOTES
Pharmacist Renal Dose Adjustment Note    Marisol Kerr is a 77 y.o. female being treated with the medication levofloxacin    Patient Data:    Vital Signs (Most Recent):  Temp: 99.1 °F (37.3 °C) (05/25/23 1649)  Pulse: 88 (05/25/23 1930)  Resp: (!) 23 (05/25/23 1632)  BP: (!) 98/51 (05/25/23 1930)  SpO2: 99 % (05/25/23 1930) Vital Signs (72h Range):  Temp:  [99.1 °F (37.3 °C)]   Pulse:  [88-96]   Resp:  [23]   BP: ()/(51-74)   SpO2:  [85 %-100 %]      Recent Labs   Lab 05/25/23  1603   CREATININE 1.8*     Serum creatinine: 1.8 mg/dL (H) 05/25/23 1603  Estimated creatinine clearance: 26 mL/min (A)    Medication: levofloxacin dose: 500 frequency Q24H will be changed to medication:levofloxacin dose:500 mg x 1 dose then 250 mg frequency:Q24H    Pharmacist's Name: Mik Berry  Pharmacist's Extension: [phone]

## 2023-05-26 NOTE — PLAN OF CARE
Pending sale to [facility]  Initial Discharge Assessment       Primary Care Provider: Khadar Dwyer MD    Admission Diagnosis: CHF (congestive heart failure) [I50.9]    Admission Date: 5/25/2023    DC assessment completed with patient's daughter, Marisol, via telephone.  Information verified as correct on facesheet.  Denies HPOA, but states she would like information with the proper steps to get one (CM notified Jessenia).  Denies HH/HD/Coumadin.  Per daughter, patient has a toilet chair, shower chair, cane, walker, oxygen 2-3L ([facility]). Patient independent in ADL's.  DC plan is home. Family to provide transport on discharge.             Payor: MEDICARE / Plan: MEDICARE PART A & B / Product Type: Government /     Extended Emergency Contact Information  Primary Emergency Contact: Marisol Kerr  Address: [address] [place], [place] [address]           [place] [address] [place]  Mobile Phone: [phone]  Relation: Daughter  Preferred language: English   needed? No    Discharge Plan A: (P) Home  Discharge Plan B: (P) Home with family      [facility] [address] [place], [place] [address]  [facility] [address]  Phone: [phone] Fax: [phone]    [facility] [place], [place] - [address] [facility]  [address] [facility] [address]  Phone: [phone] Fax: [phone]      Initial Assessment (most recent)       Adult Discharge Assessment - 05/26/23 1312          Discharge Assessment    Assessment Type Discharge Planning Assessment (P)      Confirmed/corrected address, phone number and insurance Yes (P)      Confirmed Demographics Correct on Facesheet (P)      Source of Information family (P)      Reason For Admission shortness of breath (P)      People in Home alone (P)      Facility Arrived From: home (P)      Do you expect to return to your current living situation? Yes (P)      Do you have help at home or someone to help you manage your care  at home? No (P)      Readmission within 30 days? No (P)      Patient currently being followed by outpatient case management? No (P)      Do you currently have service(s) that help you manage your care at home? No (P)      Do you take prescription medications? Yes (P)      Do you have prescription coverage? Yes (P)      Do you have any problems affording any of your prescribed medications? No (P)      Is the patient taking medications as prescribed? yes (P)      Who is going to help you get home at discharge? DaughterMarisol (P)      How do you get to doctors appointments? family or friend will provide (P)      Are you on dialysis? No (P)      Do you take coumadin? No (P)      Discharge Plan A Home (P)      Discharge Plan B Home with family (P)      DME Needed Upon Discharge  none (P)      Discharge Plan discussed with: Adult children (P)

## 2023-05-26 NOTE — CARE UPDATE
05/26/23 0812   Patient Assessment/Suction   Level of Consciousness (AVPU) alert   Respiratory Effort Normal;Unlabored   Expansion/Accessory Muscles/Retractions no use of accessory muscles;no retractions;expansion symmetric   All Lung Fields Breath Sounds Anterior:;Lateral:;diminished   Rhythm/Pattern, Respiratory tachypneic;unlabored;pattern regular;depth regular   Skin Integrity   $ Wound Care Tech Time 15 min   Area Observed Left;Right;Behind ear;Cheek;Bridge of nose   Skin Appearance without discoloration   PRE-TX-O2   Device (Oxygen Therapy) high flow nasal cannula   $ Is the patient on Low Flow Oxygen? Yes   Flow (L/min) 7   SpO2 97 %   Pulse Oximetry Type Continuous   $ Pulse Oximetry - Multiple Charge Pulse Oximetry - Multiple   Pulse 86   Resp 20   Aerosol Therapy   $ Aerosol Therapy Charges Aerosol Treatment   Daily Review of Necessity (SVN) completed   Respiratory Treatment Status (SVN) given   Treatment Route (SVN) mask;oxygen   Patient Position (SVN) HOB elevated   Post Treatment Assessment (SVN) breath sounds unchanged   Signs of Intolerance (SVN) none   Preset CPAP/BiPAP Settings   Mode Of Delivery BiPAP;Standby   $ CPAP/BiPAP Daily Charge BiPAP/CPAP Daily   Education   $ Education BiPAP;Bronchodilator;15 min   Respiratory Evaluation   $ Care Plan Tech Time 15 min   $ Eval/Re-eval Charges Re-evaluation

## 2023-05-26 NOTE — PLAN OF CARE
Nitro drip turned off r/t hypotension, small amount of precedex infusing, heparin gtt infusing. Purewick in place. Turning pt q 2 h as tolerated. Nasal canula as tolerated. Prn morphine given for air hunger.   Problem: Adult Inpatient Plan of Care  Goal: Plan of Care Review  Outcome: Ongoing, Progressing     Problem: Adult Inpatient Plan of Care  Goal: Patient-Specific Goal (Individualized)  Outcome: Ongoing, Progressing     Problem: Adult Inpatient Plan of Care  Goal: Absence of Hospital-Acquired Illness or Injury  Outcome: Ongoing, Progressing

## 2023-05-26 NOTE — CONSULTS
[facility]  Department of Cardiology  Consult Note      PATIENT NAME: Marisol Kerr    MRN: [id number]  TODAY'S DATE: 05/26/2023  ADMIT DATE: 5/25/2023                          CONSULT REQUESTED BY: Vamshi Young MD    SUBJECTIVE     PRINCIPAL PROBLEM: Shortness of breath      REASON FOR CONSULT:    Shortness of breath, chest pain, elevated troponin      HPI:    Patient is a 77-year-old female who is known to our practice and has had multiple recent hospitalizations related to breathing issues and chest pain.  Patient reports a sudden onset of chest discomfort and shortness of breath with O2 sats in the 70s per EMS.  Patient was brought into the emergency room and placed in the ICU.  She had a mild nondiagnostic troponin elevation which is now trending down.  Patient denies chest discomfort except when she begins to have breathing issues.  She is a known history of severe COPD with poor lung function, chronic diastolic heart failure, chronic hypoxemic respiratory failure on home O2, obesity, CKD stage 3, anemia, and multivessel CAD of which she has not recently received a stent per my chart review.        Review of patient's allergies indicates:   Allergen Reactions    Iodinated contrast media     Strawberries [strawberry] Hives and Itching       Past Medical History:   Diagnosis Date    CAD (coronary artery disease)     Cancer     colon    CHF (congestive heart failure)     Colitis     Colon cancer     COPD (chronic obstructive pulmonary disease)     Decreased hearing     Diabetes mellitus     Diabetes mellitus, type 2     Hypercholesterolemia     Hypertension     Hypoxemia     Insomnia     Obesity     Osteoarthritis      Past Surgical History:   Procedure Laterality Date    ANGIOGRAM, CORONARY, WITH LEFT HEART CATHETERIZATION N/A 2/10/2022    Procedure: Angiogram, Coronary, with Left Heart Cath;  Surgeon: Cristian Benjamin MD;  Location: [facility] CATH/EP LAB;  Service: Cardiology;  Laterality: N/A;     CARDIAC CATHETERIZATION      CHOLECYSTECTOMY      COLECTOMY      CORONARY ANGIOPLASTY      CORONARY STENT PLACEMENT      ERCP N/A 2023    Procedure: ERCP (ENDOSCOPIC RETROGRADE CHOLANGIOPANCREATOGRAPHY);  Surgeon: Biju Kramer III, MD;  Location: [facility];  Service: Endoscopy;  Laterality: N/A;    ESOPHAGOGASTRODUODENOSCOPY N/A 2023    Procedure: EGD (ESOPHAGOGASTRODUODENOSCOPY);  Surgeon: Aarti Alvarez MD;  Location: [facility] ENDO;  Service: Endoscopy;  Laterality: N/A;    EXTERNAL EAR SURGERY      EYE SURGERY      FLEXIBLE SIGMOIDOSCOPY N/A 2019    Procedure: SIGMOIDOSCOPY, FLEXIBLE;  Surgeon: Biju Kramer III, MD;  Location: [facility];  Service: Endoscopy;  Laterality: N/A;    HYSTERECTOMY      partial     Social History     Tobacco Use    Smoking status: Former     Packs/day: 3.00     Years: 50.00     Pack years: 150.00     Types: Cigarettes     Start date: 3/30/1964     Quit date: 2006     Years since quittin.2    Smokeless tobacco: Never    Tobacco comments:     ex smoker 15 years   Substance Use Topics    Alcohol use: Yes    Drug use: No        REVIEW OF SYSTEMS  Per HPI    OBJECTIVE     VITAL SIGNS (Most Recent)  Temp: 97.7 °F (36.5 °C) (23 0701)  Pulse: 78 (23 09)  Resp: (!) 45 (23)  BP: (!) 178/82 (23 0900)  SpO2: 100 % (23)    VENTILATION STATUS  Resp: (!) 45 (23)  SpO2: 100 % (23)  Oxygen Concentration (%):  [] 30        I & O (Last 24H):  Intake/Output Summary (Last 24 hours) at 2023 1022  Last data filed at 2023 0700  Gross per 24 hour   Intake 100 ml   Output 400 ml   Net -300 ml       WEIGHTS  Wt Readings from Last 1 Encounters:   23 0115 68.8 kg (151 lb 10.8 oz)   23 1649 77.1 kg (170 lb)       PHYSICAL EXAM  CONSTITUTIONAL: No fever, no chills  HEENT: Normocephalic, atraumatic,pupils reactive to light; very hard of hearing                  NECK:  No JVD no carotid  bruit  CVS: S1S2+, RRR  LUNGS: diminished   ABDOMEN: Soft, NT, BS+  EXTREMITIES: No cyanosis, edema  : No daniel catheter  NEURO: AAO X 3  PSY: Normal affect      HOME MEDICATIONS:  No current facility-administered medications on file prior to encounter.     Current Outpatient Medications on File Prior to Encounter   Medication Sig Dispense Refill    albuterol (VENTOLIN HFA) 90 mcg/actuation inhaler Inhale 2 puffs into the lungs every 6 (six) hours as needed for Wheezing or Shortness of Breath. Rescue 36 g 3    albuterol-ipratropium (DUO-NEB) 2.5 mg-0.5 mg/3 mL nebulizer solution Take 3 mLs by nebulization every 4 (four) hours as needed for Wheezing or Shortness of Breath. Rescue 120 each 6    allopurinoL (ZYLOPRIM) 100 MG tablet Take 0.5 tablets (50 mg total) by mouth once daily. 45 tablet 1    aspirin (ECOTRIN) 81 MG EC tablet Take 1 tablet (81 mg total) by mouth every morning. 90 tablet 3    atorvastatin (LIPITOR) 40 MG tablet Take 1 tablet (40 mg total) by mouth once daily. 90 tablet 3    cholecalciferol, vitamin D3, 125 mcg (5,000 unit) Tab Take 5,000 Units by mouth once daily.      cholestyramine (QUESTRAN) 4 gram packet Take 1 packet (4 g total) by mouth 2 (two) times daily. 180 packet 3    coenzyme Q10 100 mg capsule Take 100 mg by mouth every morning.      diltiaZEM (CARDIZEM CD) 120 MG Cp24 Take 1 capsule (120 mg total) by mouth once daily. 90 capsule 1    ergocalciferol (VITAMIN D2) 50,000 unit Cap Take 1 capsule (50,000 Units total) by mouth every 7 days. 12 capsule 1    ferrous sulfate 325 (65 FE) MG EC tablet Take 325 mg by mouth once daily.      fish oil-omega-3 fatty acids 300-1,000 mg capsule Take 2 capsules by mouth every morning.      ipratropium-albuteroL (COMBIVENT RESPIMAT)  mcg/actuation inhaler Inhale 2 puffs into the lungs every 4 (four) hours as needed for Shortness of Breath. Rescue 12 g 3    isosorbide mononitrate (IMDUR) 120 MG 24 hr tablet Take 1 tablet (120 mg total) by mouth  once daily. 90 tablet 1    ondansetron (ZOFRAN-ODT) 4 MG TbDL Take 1 tablet (4 mg total) by mouth every 6 (six) hours as needed (nausea). 30 tablet 0    pantoprazole (PROTONIX) 40 MG tablet Take 1 tablet (40 mg total) by mouth once daily. 90 tablet 1    polycarbophil (FIBERCON) 625 mg tablet Take 625 mg by mouth once daily.      ranolazine (RANEXA) 500 MG Tb12 Take 1 tablet (500 mg total) by mouth 2 (two) times daily. 180 tablet 1    ticagrelor (BRILINTA) 90 mg tablet Take 1 tablet (90 mg total) by mouth every 12 (twelve) hours. 60 tablet 5    umeclidinium (INCRUSE ELLIPTA) 62.5 mcg/actuation inhalation capsule Inhale 62.5 mcg into the lungs every morning. Controller 30 each 11    vit C/E/Zn/coppr/lutein/zeaxan (PRESERVISION AREDS-2 ORAL) Take 1 tablet by mouth once daily.      furosemide (LASIX) 40 MG tablet Take 1 tablet (40 mg total) by mouth once daily. 30 tablet 0    hydrALAZINE (APRESOLINE) 50 MG tablet Take 1 tablet (50 mg total) by mouth every 12 (twelve) hours. 60 tablet 0    metoprolol tartrate (LOPRESSOR) 50 MG tablet Take 1 tablet (50 mg total) by mouth 2 (two) times daily. 180 tablet 3    mupirocin (BACTROBAN) 2 % ointment Apply topically 2 (two) times daily. 30 g 3    nebulizer and compressor Reshma as directed 1 each 0    nitroGLYCERIN 0.4 MG/DOSE TL SPRY (NITROLINGUAL) 400 mcg/spray spray Place 1 spray under the tongue every 5 (five) minutes as needed for Chest pain (max of 3 doses). 4.9 g 1    [DISCONTINUED] benazepriL (LOTENSIN) 40 MG tablet Take 1 tablet (40 mg total) by mouth once daily. 90 tablet 1    [DISCONTINUED] cimetidine (TAGAMET) 300 MG tablet Take 1 tablet (300 mg total) by mouth every 6 (six) hours. Take 1 tablet (300 mg total) by mouth starting at 6 PM on Sunday April 17, 2022 (the evening before your angiogram procedure). Then take 1 tablet at 12 AM, then take 1 tablet at 6 AM on Monday April 18th. 3 tablet 0    [DISCONTINUED] lisinopriL 10 MG tablet Take 1 tablet (10 mg total) by mouth  once daily. HOLD UNTIL OTHERWISE DIRECTED BY PRIMARY CARE PROVIDER 90 tablet 3    [DISCONTINUED] lovastatin (MEVACOR) 40 MG tablet Take 1 tablet (40 mg total) by mouth every other day. 45 tablet 3       SCHEDULED MEDS:   albuterol-ipratropium  3 mL Nebulization Q6H    allopurinoL  50 mg Oral Daily    atorvastatin  40 mg Oral Daily    chlorhexidine  15 mL Mouth/Throat BID    dexmedeTOMIDine in 0.9 % NaCL        diltiaZEM  120 mg Oral Daily    furosemide (LASIX) injection  40 mg Intravenous Once    heparin (PORCINE)  80 Units/kg (Adjusted) Intravenous Once    hydrALAZINE  50 mg Oral Q12H    insulin aspart U-100  0-8 Units Subcutaneous Q6H    levoFLOXacin  250 mg Intravenous Q24H    methylPREDNISolone sodium succinate injection  40 mg Intravenous Q8H    metoprolol tartrate  50 mg Oral BID    morphine        mupirocin   Nasal BID    ranolazine  500 mg Oral BID       CONTINUOUS INFUSIONS:   dexmedeTOMIDine (Precedex) infusion (titrating)      heparin (porcine) in D5W      nitroGLYCERIN 5 mcg/min (05/26/23 0930)       PRN MEDS:dextrose 50%, dextrose 50%, glucagon (human recombinant), glucagon (human recombinant), glucose, glucose, heparin (PORCINE), heparin (PORCINE), hydrALAZINE, midodrine, morphine, naloxone, potassium bicarbonate, potassium bicarbonate, potassium bicarbonate, sodium chloride 0.9%    LABS AND DIAGNOSTICS     CBC LAST 3 DAYS  Recent Labs   Lab 05/25/23  1603 05/26/23  0506   WBC 11.78 6.43   RBC 4.10 3.87*   HGB 11.0* 10.5*   HCT 37.1 34.4*   MCV 91 89   MCH 26.8* 27.1   MCHC 29.6* 30.5*   RDW 17.1* 16.9*    164   MPV 12.5 12.4   GRAN 77.0*  9.1* 80.0*   LYMPH 17.0*  2.0 8.0*   MONO 5.0  0.6 0.0*   BASO 0.03  --    NRBC 0 0       COAGULATION LAST 3 DAYS  Recent Labs   Lab 05/25/23  1603   INR 0.9   APTT 24.8       CHEMISTRY LAST 3 DAYS  Recent Labs   Lab 05/25/23  1603 05/25/23  1629 05/25/23  1916 05/26/23  0506 05/26/23  0810     --   --  137  --    K 4.8  --   --  4.5  --    CL 98  --    --  96  --    CO2 29  --   --  29  --    ANIONGAP 9  --   --  12  --    BUN 25*  --   --  29*  --    CREATININE 1.8*  --   --  1.9*  --    *  --   --  159*  --    CALCIUM 8.4*  --   --  8.5*  --    PH  --  7.287* 7.396  --  7.388   MG 1.8  --   --  1.9  --    ALBUMIN 3.1*  --   --  2.9*  --    PROT 6.8  --   --  6.4  --    ALKPHOS 97  --   --  83  --    ALT 16  --   --  14  --    AST 23  --   --  20  --    BILITOT 0.6  --   --  0.9  --        CARDIAC PROFILE LAST 3 DAYS  Recent Labs   Lab 05/25/23  1603 05/25/23  1813 05/25/23  2324 05/26/23  0506   *  --   --   --    TROPONINIHS 14.9 159.7* 623.7* 546.8*       ENDOCRINE LAST 3 DAYS  No results for input(s): TSH, PROCAL in the last 168 hours.    LAST ARTERIAL BLOOD GAS  ABG  Recent Labs   Lab 05/26/23  0810   PH 7.388   PO2 76*   PCO2 53.2*   HCO3 32.1*   BE 7       LAST 7 DAYS MICROBIOLOGY   Microbiology Results (last 7 days)       ** No results found for the last 168 hours. **            MOST RECENT IMAGING  US Lower Extremity Veins Right  US LOWER EXTREMITY VEINS LIMITED FOLLOW-UP UNILATERAL    Interpretation time/date:  5/26/2023 6:28 AM CDT    History:RLE edema    Comparison: None    Findings: Routine venous ultrasound with color-flow Doppler was performed of the left lower extremity. Deep venous structures of the calf and thigh are patent without acute or chronic DVT observed. There is normal phasicity without gross reflux. The great saphenous vein appears patent. Incidentally a arterial occlusion of the SFA is suggested with common femoral artery mid vessel stenosis.    Impression: No evidence of left lower extremity DVT. 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    Electronically signed by:  Levar Saab MD  5/26/2023 6:29 AM CDT Workstation: [id number]  CT Chest Without Contrast  EXAM:  CT Chest Without Intravenous Contrast    CLINICAL HISTORY:  The patient is 77 years old and is Female;  sob    TECHNIQUE:  Axial computed tomography images of the chest without intravenous contrast.  Sagittal and coronal reformatted images were created and reviewed.  This CT exam was performed using one or more of the following dose reduction techniques:  automated exposure control, adjustment of the mA and/or kV according to patient size, and/or use of iterative reconstruction technique.    COMPARISON:  No relevant prior studies available.    FINDINGS:  LUNGS:  A spiculated mass within the right lower lobe measuring 3.0 x 2.5 cm is present. The lungs are hyperinflated with mild emphysematous change. Minimal bronchial wall thickening of the lower lobe bronchi is noted with adjacent mild surrounding inflammation. Groundglass opacity within the left upper lobe is present.  PLEURAL SPACE:  Unremarkable.  No pneumothorax.  No significant effusion.  HEART:  No cardiomegaly.  No pericardial effusion.  BONES/JOINTS:  Multilevel degenerative change of the spine is present.  SOFT TISSUES:  The soft tissues are normal.  VASCULATURE:  Atherosclerosis of the aorta is present.  No thoracic aortic aneurysm.  LYMPH NODES:  Unremarkable. No enlarged lymph nodes.  TUBES, LINES AND DEVICES:  A right chest port is present with the tip in the SVC.    IMPRESSION:  1.  Spiculated mass within the right lower lobe. Consider a non-contrast Chest CT at 3 months, a PET/CT, or tissue sampling.    2.  Findings suggest mild bronchitis with developing infiltrate in the left upper lobe.    Electronically signed by:  Erica Ornelas MD  5/26/2023 12:53 AM CDT Workstation: [id number]      ECHOCARDIOGRAM RESULTS (last 5)  Results for orders placed during the hospital encounter of 01/15/23    Echo    Interpretation Summary  · The left ventricle is normal in size with moderate concentric hypertrophy and normal systolic function.  · Sinus tachycardia heart rate 110  · The estimated ejection fraction is 70%.  · Indeterminate left ventricular diastolic  function with normal left atrial pressure.  · Atrial fibrillation not observed.  · Normal right ventricular size with normal right ventricular systolic function.  · Mild mitral regurgitation.  · Normal central venous pressure (3 mmHg).      Results for orders placed during the hospital encounter of 04/08/22    Echo    Interpretation Summary  · Limited study for wall morgan. several off axis views.  · The estimated ejection fraction is 60%.  · The left ventricle is normal in size with normal systolic function.  · The following segments are hypokinetic: basal inferoseptal and basal inferior. All other segments are normal.  · Normal right ventricular size with normal right ventricular systolic function.      Results for orders placed during the hospital encounter of 02/11/22    Echo    Interpretation Summary  · The left ventricle is normal in size with normal systolic function.  · The estimated ejection fraction is 65%.  · There is a minor septal wall motion abnormality.  · Severe left atrial enlargement.  · Grade II left ventricular diastolic dysfunction.  · Normal right ventricular size with normal right ventricular systolic function.  · Normal central venous pressure (3 mmHg).      Results for orders placed during the hospital encounter of 02/10/22    Echo    Interpretation Summary  · The estimated PA systolic pressure is 37 mmHg.  · The left ventricle is normal in size with normal systolic function.  · The estimated ejection fraction is 60%.  · Grade II left ventricular diastolic dysfunction.  · Normal right ventricular size with normal right ventricular systolic function.  · Severe left atrial enlargement.  · Moderate right atrial enlargement.  · Mild mitral regurgitation.  · Mild tricuspid regurgitation.  · Normal central venous pressure (3 mmHg).      Results for orders placed during the hospital encounter of 09/16/19    Echo Color Flow Doppler? Yes    Interpretation Summary  · Concentric left ventricular  hypertrophy.  · Normal left ventricular systolic function. The estimated ejection fraction is 65%  · Normal LV diastolic function.  · Normal right ventricular systolic function.  · Calculated RVSP is 28mmhg.      CURRENT/PREVIOUS VISIT EKG  Results for orders placed or performed during the hospital encounter of 05/25/23   EKG 12-lead    Collection Time: 05/26/23  9:33 AM    Narrative    Test Reason : R07.9,    Vent. Rate : 086 BPM     Atrial Rate : 086 BPM     P-R Int : 194 ms          QRS Dur : 096 ms      QT Int : 446 ms       P-R-T Axes : 061 063 -22 degrees     QTc Int : 533 ms    Normal sinus rhythm  Septal infarct ,age undetermined  Prolonged QT  Abnormal ECG  When compared with ECG of 25-MAY-2023 21:24,  Septal infarct is now Present  Nonspecific T wave abnormality now evident in Inferior leads  QT has lengthened    Referred By: CAMELIA   SELF           Confirmed By:            ASSESSMENT/PLAN:     Active Hospital Problems    Diagnosis    *Shortness of breath       ASSESSMENT & PLAN:     Acute on chronic hypoxemic respiratory failure  troponin elevation likely due to demand ischemia  Multivessel CAD  Severe COPD  HFpEF  HTN  Anxiety      RECOMMENDATIONS:    Furosemide 40 mg IV given x 1 dose today. Monitor strict I&O. Can proceed with gentle IV diuresis with renal function in mind.   troponin elevation is likely due to demand ischemia. However she does have known multivessel CAD. Per my chart review, she did not receive any intervention since her angiogram in February 2022 which was diagnostic. She has been on DAPT with Brilinta and aspirin.   Consider switching to Plavix as Brilinta may cause SOB (not hypoxemia) at times. This may complicate her clinical picture if it is affecting her at all.   Continue nitro drip for chest pain (although likely pleuritic) and BP control.   Heparin drip is being added by hospitalist due to concern for possible PE. Would hold Brilinta for now while on heparin due to hx of  anemia and advanced age.   Will follow with you. Thank you for the consultation.         Laura Stevens NP  Date of Service: 05/26/2023  10:22 AM     I have personally interviewed and examined the patient, I have reviewed the Nurse Practitioner's history and physical, assessment, and plan. I have personally evaluated the patient at bedside and agree with the findings and made appropriate changes as necessary in recommendations.    Edwige León MD  Department of Cardiology  [facility]  5/26/23

## 2023-05-26 NOTE — NURSING
Pt refused waffle mattress, stated that she does not like them that she used one at the rehab. Educated pt that we must turn her q 2 h to prevent any breakdown on her bottom, pt verbalized understanding. Bilat heels floated on pillow, mepilex on sacrum and on bridge of nose.    5

## 2023-05-26 NOTE — PLAN OF CARE
Problem: Adult Inpatient Plan of Care  Goal: Plan of Care Review  Outcome: Ongoing, Progressing     Problem: Adult Inpatient Plan of Care  Goal: Patient-Specific Goal (Individualized)  Outcome: Ongoing, Progressing     Problem: Adult Inpatient Plan of Care  Goal: Absence of Hospital-Acquired Illness or Injury  Outcome: Ongoing, Progressing     Problem: Adult Inpatient Plan of Care  Goal: Optimal Comfort and Wellbeing  Outcome: Ongoing, Progressing     Problem: Diabetes Comorbidity  Goal: Blood Glucose Level Within Targeted Range  Outcome: Ongoing, Progressing     Problem: Impaired Wound Healing  Goal: Optimal Wound Healing  Outcome: Ongoing, Progressing

## 2023-05-26 NOTE — CONSULTS
"Pulmonary/Critical Care Consult      Patient name: Marisol Kerr  MRN: [id number]  Date: 05/26/2023    Admit Date: 5/25/2023  Consult Requested By: Vamshi Young MD    Reason for Consult: AECOPD, respiratory failure    HPI:    5/26/2023 - 78 yo ho COPD (severe, home O2), ASCVD, diastolic heart failure, CKD3, obesity with several admissions this year came to ER with increased SOB, worsened saturations.  Was admitted and placed on BiPAP.  Overnight she has done OK and this AM we tried to take her off BiPAP and after a short while she developed increased SOB, felt "like I'm going to die", she was placed back on BiPAP, got some morphine and was started on precedex due to anxiety.  I came back to revisit her and she indicates that if she worsens she would like to be intubated.  ROS is difficult due to hearing.  CT chest reviewed.  EKG noted has elevated QTc    Review of Systems    Review of Systems   Constitutional:  Positive for malaise/fatigue. Negative for chills, diaphoresis, fever and weight loss.   HENT:  Positive for hearing loss. Negative for congestion.    Eyes:  Negative for pain.   Respiratory:  Positive for shortness of breath and wheezing. Negative for cough, hemoptysis, sputum production and stridor.    Cardiovascular:  Positive for chest pain ("heavy"). Negative for palpitations, orthopnea, claudication, leg swelling and PND.   Gastrointestinal:  Negative for abdominal pain, blood in stool, constipation, diarrhea, heartburn, nausea and vomiting.   Genitourinary:  Negative for dysuria, frequency, hematuria and urgency.   Musculoskeletal:  Negative for falls and myalgias.   Neurological:  Positive for weakness. Negative for sensory change and focal weakness.   Psychiatric/Behavioral:  Negative for depression, substance abuse and suicidal ideas. The patient is not nervous/anxious.      Past Medical History    Past Medical History:   Diagnosis Date    CAD (coronary artery disease)     Cancer     colon    CHF " (congestive heart failure)     Colitis     Colon cancer     COPD (chronic obstructive pulmonary disease)     Decreased hearing     Diabetes mellitus     Diabetes mellitus, type 2     Hypercholesterolemia     Hypertension     Hypoxemia     Insomnia     Obesity     Osteoarthritis        Past Surgical History    Past Surgical History:   Procedure Laterality Date    ANGIOGRAM, CORONARY, WITH LEFT HEART CATHETERIZATION N/A 2/10/2022    Procedure: Angiogram, Coronary, with Left Heart Cath;  Surgeon: Cristian Benjamin MD;  Location: [facility] CATH/EP LAB;  Service: Cardiology;  Laterality: N/A;    CARDIAC CATHETERIZATION      CHOLECYSTECTOMY      COLECTOMY      CORONARY ANGIOPLASTY      CORONARY STENT PLACEMENT      ERCP N/A 1/16/2023    Procedure: ERCP (ENDOSCOPIC RETROGRADE CHOLANGIOPANCREATOGRAPHY);  Surgeon: Biju Kramer III, MD;  Location: [facility] ENDO;  Service: Endoscopy;  Laterality: N/A;    ESOPHAGOGASTRODUODENOSCOPY N/A 1/29/2023    Procedure: EGD (ESOPHAGOGASTRODUODENOSCOPY);  Surgeon: Aarti Alvarez MD;  Location: [facility] ENDO;  Service: Endoscopy;  Laterality: N/A;    EXTERNAL EAR SURGERY      EYE SURGERY      FLEXIBLE SIGMOIDOSCOPY N/A 9/19/2019    Procedure: SIGMOIDOSCOPY, FLEXIBLE;  Surgeon: Biju Kramer III, MD;  Location: [facility];  Service: Endoscopy;  Laterality: N/A;    HYSTERECTOMY      partial       Medications (scheduled):      albuterol-ipratropium  3 mL Nebulization Q6H    allopurinoL  50 mg Oral Daily    [START ON 5/27/2023] aspirin  81 mg Oral Daily    atorvastatin  40 mg Oral Daily    chlorhexidine  15 mL Mouth/Throat BID    diltiaZEM  120 mg Oral Daily    heparin (PORCINE)  60 Units/kg (Adjusted) Intravenous Once    hydrALAZINE  50 mg Oral Q12H    insulin aspart U-100  0-8 Units Subcutaneous Q6H    levoFLOXacin  250 mg Intravenous Q24H    methylPREDNISolone sodium succinate injection  40 mg Intravenous Q8H    metoprolol tartrate  50 mg Oral BID    mupirocin   Nasal BID    ranolazine   "500 mg Oral BID    ticagrelor  90 mg Oral BID       Medications (infusions):      dexmedeTOMIDine (Precedex) infusion (titrating) 0.2 mcg/kg/hr (23 1027)    heparin (porcine) in D5W      nitroGLYCERIN 5 mcg/min (23 0930)       Medications (prn):     dextrose 50%, dextrose 50%, glucagon (human recombinant), glucagon (human recombinant), glucose, glucose, heparin (PORCINE), heparin (PORCINE), hydrALAZINE, midodrine, morphine, naloxone, potassium bicarbonate, potassium bicarbonate, potassium bicarbonate, sodium chloride 0.9%    Family History:   Family History   Problem Relation Age of Onset    Febrile seizures Mother     Heart failure Father        Social History: Tobacco:   Social History     Tobacco Use   Smoking Status Former    Packs/day: 3.00    Years: 50.00    Pack years: 150.00    Types: Cigarettes    Start date: 3/30/1964    Quit date: 2006    Years since quittin.2   Smokeless Tobacco Never   Tobacco Comments    ex smoker 15 years                                EtOH:   Social History     Substance and Sexual Activity   Alcohol Use Yes                                Drugs:   Social History     Substance and Sexual Activity   Drug Use No       Physical Exam    Vital signs:  Temp:  [97 °F (36.1 °C)-99.1 °F (37.3 °C)]   Pulse:  [64-96]   Resp:  [20-45]   BP: ()/()   SpO2:  [85 %-100 %]     Intake/Output:   Intake/Output Summary (Last 24 hours) at 2023 1150  Last data filed at 2023 0700  Gross per 24 hour   Intake 100 ml   Output 400 ml   Net -300 ml        BMI: Estimated body mass index is 26.45 kg/m² as calculated from the following:    Height as of this encounter: 5' 3.5" (1.613 m).    Weight as of this encounter: 68.8 kg (151 lb 10.8 oz).    Physical Exam  Vitals and nursing note reviewed.   Constitutional:       General: She is not in acute distress.     Appearance: Normal appearance. She is obese. She is ill-appearing (chronically). She is not toxic-appearing or " diaphoretic.      Comments: Wearing BiPAP   HENT:      Head: Normocephalic and atraumatic.      Right Ear: External ear normal.      Left Ear: External ear normal.      Nose: Nose normal. No congestion or rhinorrhea.      Mouth/Throat:      Mouth: Mucous membranes are moist.      Pharynx: Oropharynx is clear. No oropharyngeal exudate or posterior oropharyngeal erythema.   Eyes:      General: No scleral icterus.        Right eye: No discharge.         Left eye: No discharge.      Extraocular Movements: Extraocular movements intact.      Conjunctiva/sclera: Conjunctivae normal.      Pupils: Pupils are equal, round, and reactive to light.   Neck:      Vascular: No carotid bruit.   Cardiovascular:      Rate and Rhythm: Normal rate and regular rhythm.      Pulses: Normal pulses.      Heart sounds: No murmur heard.    No friction rub. No gallop.   Pulmonary:      Effort: Pulmonary effort is normal. No respiratory distress.      Breath sounds: No stridor. No wheezing, rhonchi or rales.      Comments: Decreased BS throughout  + Friedman's sign  Labored at times  Chest:      Chest wall: No tenderness.   Abdominal:      General: Bowel sounds are normal. There is no distension.      Tenderness: There is no abdominal tenderness. There is no guarding.      Comments: Obese    Musculoskeletal:         General: No swelling. Normal range of motion.      Cervical back: Normal range of motion and neck supple. No rigidity or tenderness.      Right lower leg: No edema.      Left lower leg: No edema.   Lymphadenopathy:      Cervical: No cervical adenopathy.   Skin:     General: Skin is warm and dry.      Capillary Refill: Capillary refill takes less than 2 seconds.      Coloration: Skin is not jaundiced.      Findings: No bruising.   Neurological:      General: No focal deficit present.      Mental Status: She is alert and oriented to person, place, and time. Mental status is at baseline.      Cranial Nerves: No cranial nerve deficit.       Sensory: No sensory deficit.      Motor: No weakness.      Comments: [facility]   Psychiatric:         Mood and Affect: Mood normal.         Behavior: Behavior normal.         Thought Content: Thought content normal.         Judgment: Judgment normal.      Comments: Anxious        Laboratory    Recent Labs   Lab 05/26/23  0506   WBC 6.43   RBC 3.87*   HGB 10.5*   HCT 34.4*      MCV 89   MCH 27.1   MCHC 30.5*       Recent Labs   Lab 05/26/23  0506   CALCIUM 8.5*   PROT 6.4      K 4.5   CO2 29   CL 96   BUN 29*   CREATININE 1.9*   ALKPHOS 83   ALT 14   AST 20   BILITOT 0.9       Recent Labs   Lab 05/25/23  1603 05/26/23  1016   INR 0.9  --    APTT 24.8 25.6       No results for input(s): CPK, CPKMB, TROPONINI, MB in the last 24 hours.    Additional labs: reviewed    Microbiology:       Microbiology Results (last 7 days)       ** No results found for the last 168 hours. **            Radiology    X-Ray Chest AP Portable  CLINICAL HISTORY:  77 years (1945) Female sob Shortness of breath; Hx of cancer, chf, cad, copd, diabetes, htn    TECHNIQUE:  Portable AP radiograph the chest.    COMPARISON:  Radiograph from May 25, 2023    FINDINGS:  There are unchanged faint linear opacities at the lung bases suggestive of atelectasis/scarring, aspiration/pneumonia or trace edema in the appropriate clinical setting. There is blunting of the costophrenic angles suggestive of either atelectasis, scarring or trace pleural fluid. There is an ill-defined masslike density posterior to the right hilum highly suspicious for malignancy. The heart is normal in size. Atheromatous calcifications are seen at the aortic arch. Osseous structures show degenerative disc disease and degenerative changes in the shoulders. The visualized upper abdomen is unremarkable.    IMPRESSION:  Unchanged radiograph of the chest when accounting for differences in imaging technique.    Electronically signed by:  García Russell MD  5/26/2023 10:29 AM CDT  Workstation: [id number]  US Lower Extremity Veins Right  US LOWER EXTREMITY VEINS LIMITED FOLLOW-UP UNILATERAL    Interpretation time/date:  5/26/2023 6:28 AM CDT    History:RLE edema    Comparison: None    Findings: Routine venous ultrasound with color-flow Doppler was performed of the left lower extremity. Deep venous structures of the calf and thigh are patent without acute or chronic DVT observed. There is normal phasicity without gross reflux. The great saphenous vein appears patent. Incidentally a arterial occlusion of the SFA is suggested with common femoral artery mid vessel stenosis.    Impression: No evidence of left lower extremity DVT. 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    Electronically signed by:  Levar Saab MD  5/26/2023 6:29 AM CDT Workstation: [id number]  CT Chest Without Contrast  EXAM:  CT Chest Without Intravenous Contrast    CLINICAL HISTORY:  The patient is 77 years old and is Female; sob    TECHNIQUE:  Axial computed tomography images of the chest without intravenous contrast.  Sagittal and coronal reformatted images were created and reviewed.  This CT exam was performed using one or more of the following dose reduction techniques:  automated exposure control, adjustment of the mA and/or kV according to patient size, and/or use of iterative reconstruction technique.    COMPARISON:  No relevant prior studies available.    FINDINGS:  LUNGS:  A spiculated mass within the right lower lobe measuring 3.0 x 2.5 cm is present. The lungs are hyperinflated with mild emphysematous change. Minimal bronchial wall thickening of the lower lobe bronchi is noted with adjacent mild surrounding inflammation. Groundglass opacity within the left upper lobe is present.  PLEURAL SPACE:  Unremarkable.  No pneumothorax.  No significant effusion.  HEART:  No cardiomegaly.  No pericardial effusion.  BONES/JOINTS:  Multilevel degenerative change of the spine is  present.  SOFT TISSUES:  The soft tissues are normal.  VASCULATURE:  Atherosclerosis of the aorta is present.  No thoracic aortic aneurysm.  LYMPH NODES:  Unremarkable. No enlarged lymph nodes.  TUBES, LINES AND DEVICES:  A right chest port is present with the tip in the SVC.    IMPRESSION:  1.  Spiculated mass within the right lower lobe. Consider a non-contrast Chest CT at 3 months, a PET/CT, or tissue sampling.    2.  Findings suggest mild bronchitis with developing infiltrate in the left upper lobe.    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       Plan    BiPAP as needed and hopefully we can avoid intubation  Steroids, respiratory treatments, antibiotics  Check procalcitonin  Precedex to help with anxiety  Prn morphine for air hunger  Cardiology consult noted  Heparin drip started per hospitalist  Clinically I doubt PE and do not feel we should do CTA - high risk to move off of floor and has elevated creatinine  Elevated d-dimer not particularly useful after admission  NTG drip started to help with BP  I am not convinced that there is an acute cardiac issues here  Watch QTc  Would not diurese further as  I do not feel she is volume overloaded  High risk of decompensation    Thank you for this consult.  I will follow with you while the patient is hospitalized.      Critical Care Time    I have spent > 35 minutes providing critical care services for this pt for the above diagnoses.  These services have included pt evaluation, pt exam, BiPAP/oxygenation assessment, discussions with staff, chart review, data review, note preparation and .  Medications have been reviewed and adjusted as needed.  The patient has life threatening illness with a high risk of decompensation and/or death.      Dereje Saul MD  [facility] Pulmonary/Critical Care

## 2023-05-27 LAB
ALBUMIN SERPL BCP-MCNC: 2.7 G/DL (ref 3.5–5.2)
ALP SERPL-CCNC: 77 U/L (ref 55–135)
ALT SERPL W/O P-5'-P-CCNC: 32 U/L (ref 10–44)
ANION GAP SERPL CALC-SCNC: 12 MMOL/L (ref 8–16)
APTT PPP: 46.2 SEC (ref 21–32)
AST SERPL-CCNC: 45 U/L (ref 10–40)
BASOPHILS # BLD AUTO: 0.01 K/UL (ref 0–0.2)
BASOPHILS NFR BLD: 0.1 % (ref 0–1.9)
BILIRUB SERPL-MCNC: 0.9 MG/DL (ref 0.1–1)
BUN SERPL-MCNC: 45 MG/DL (ref 8–23)
CALCIUM SERPL-MCNC: 8.4 MG/DL (ref 8.7–10.5)
CHLORIDE SERPL-SCNC: 99 MMOL/L (ref 95–110)
CO2 SERPL-SCNC: 29 MMOL/L (ref 23–29)
CREAT SERPL-MCNC: 2.2 MG/DL (ref 0.5–1.4)
DIFFERENTIAL METHOD: ABNORMAL
EOSINOPHIL # BLD AUTO: 0 K/UL (ref 0–0.5)
EOSINOPHIL NFR BLD: 0 % (ref 0–8)
ERYTHROCYTE [DISTWIDTH] IN BLOOD BY AUTOMATED COUNT: 16.9 % (ref 11.5–14.5)
EST. GFR  (NO RACE VARIABLE): 22.8 ML/MIN/1.73 M^2
FERRITIN SERPL-MCNC: 299 NG/ML (ref 20–300)
GLUCOSE SERPL-MCNC: 160 MG/DL (ref 70–110)
GLUCOSE SERPL-MCNC: 168 MG/DL (ref 70–110)
GLUCOSE SERPL-MCNC: 212 MG/DL (ref 70–110)
HCT VFR BLD AUTO: 33 % (ref 37–48.5)
HGB BLD-MCNC: 9.8 G/DL (ref 12–16)
IMM GRANULOCYTES # BLD AUTO: 0.05 K/UL (ref 0–0.04)
IMM GRANULOCYTES NFR BLD AUTO: 0.6 % (ref 0–0.5)
IRON SERPL-MCNC: 94 UG/DL (ref 30–160)
LYMPHOCYTES # BLD AUTO: 0.5 K/UL (ref 1–4.8)
LYMPHOCYTES NFR BLD: 6.2 % (ref 18–48)
MCH RBC QN AUTO: 26.6 PG (ref 27–31)
MCHC RBC AUTO-ENTMCNC: 29.7 G/DL (ref 32–36)
MCV RBC AUTO: 90 FL (ref 82–98)
MONOCYTES # BLD AUTO: 0.4 K/UL (ref 0.3–1)
MONOCYTES NFR BLD: 4.7 % (ref 4–15)
NEUTROPHILS # BLD AUTO: 7.3 K/UL (ref 1.8–7.7)
NEUTROPHILS NFR BLD: 88.4 % (ref 38–73)
NRBC BLD-RTO: 0 /100 WBC
PLATELET # BLD AUTO: 191 K/UL (ref 150–450)
PMV BLD AUTO: 12.7 FL (ref 9.2–12.9)
POTASSIUM SERPL-SCNC: 4.1 MMOL/L (ref 3.5–5.1)
PROT SERPL-MCNC: 5.7 G/DL (ref 6–8.4)
RBC # BLD AUTO: 3.68 M/UL (ref 4–5.4)
SATURATED IRON: 47 % (ref 20–50)
SODIUM SERPL-SCNC: 140 MMOL/L (ref 136–145)
TOTAL IRON BINDING CAPACITY: 202 UG/DL (ref 250–450)
TRANSFERRIN SERPL-MCNC: 144 MG/DL (ref 200–375)
WBC # BLD AUTO: 8.29 K/UL (ref 3.9–12.7)

## 2023-05-27 PROCEDURE — 99232 SBSQ HOSP IP/OBS MODERATE 35: CPT | Mod: ,,, | Performed by: INTERNAL MEDICINE

## 2023-05-27 PROCEDURE — 99900031 HC PATIENT EDUCATION (STAT)

## 2023-05-27 PROCEDURE — C9113 INJ PANTOPRAZOLE SODIUM, VIA: HCPCS | Performed by: INTERNAL MEDICINE

## 2023-05-27 PROCEDURE — 63600175 PHARM REV CODE 636 W HCPCS: Performed by: INTERNAL MEDICINE

## 2023-05-27 PROCEDURE — 25000003 PHARM REV CODE 250: Performed by: HOSPITALIST

## 2023-05-27 PROCEDURE — 84466 ASSAY OF TRANSFERRIN: CPT | Performed by: INTERNAL MEDICINE

## 2023-05-27 PROCEDURE — 94660 CPAP INITIATION&MGMT: CPT

## 2023-05-27 PROCEDURE — 20000000 HC ICU ROOM

## 2023-05-27 PROCEDURE — 99900035 HC TECH TIME PER 15 MIN (STAT)

## 2023-05-27 PROCEDURE — 25000003 PHARM REV CODE 250

## 2023-05-27 PROCEDURE — 36415 COLL VENOUS BLD VENIPUNCTURE: CPT | Performed by: INTERNAL MEDICINE

## 2023-05-27 PROCEDURE — 99291 PR CRITICAL CARE, E/M 30-74 MINUTES: ICD-10-PCS | Mod: ,,, | Performed by: INTERNAL MEDICINE

## 2023-05-27 PROCEDURE — 25000242 PHARM REV CODE 250 ALT 637 W/ HCPCS: Performed by: INTERNAL MEDICINE

## 2023-05-27 PROCEDURE — 99291 CRITICAL CARE FIRST HOUR: CPT | Mod: ,,, | Performed by: INTERNAL MEDICINE

## 2023-05-27 PROCEDURE — 25000003 PHARM REV CODE 250: Performed by: INTERNAL MEDICINE

## 2023-05-27 PROCEDURE — 94799 UNLISTED PULMONARY SVC/PX: CPT

## 2023-05-27 PROCEDURE — 85730 THROMBOPLASTIN TIME PARTIAL: CPT | Performed by: INTERNAL MEDICINE

## 2023-05-27 PROCEDURE — 94761 N-INVAS EAR/PLS OXIMETRY MLT: CPT

## 2023-05-27 PROCEDURE — 27000221 HC OXYGEN, UP TO 24 HOURS

## 2023-05-27 PROCEDURE — 85025 COMPLETE CBC W/AUTO DIFF WBC: CPT | Performed by: INTERNAL MEDICINE

## 2023-05-27 PROCEDURE — 82728 ASSAY OF FERRITIN: CPT | Performed by: INTERNAL MEDICINE

## 2023-05-27 PROCEDURE — 80053 COMPREHEN METABOLIC PANEL: CPT | Performed by: INTERNAL MEDICINE

## 2023-05-27 PROCEDURE — 99232 PR SUBSEQUENT HOSPITAL CARE,LEVL II: ICD-10-PCS | Mod: ,,, | Performed by: INTERNAL MEDICINE

## 2023-05-27 PROCEDURE — 94640 AIRWAY INHALATION TREATMENT: CPT

## 2023-05-27 RX ORDER — HYDRALAZINE HYDROCHLORIDE 25 MG/1
25 TABLET, FILM COATED ORAL EVERY 12 HOURS
Status: DISCONTINUED | OUTPATIENT
Start: 2023-05-27 | End: 2023-06-01 | Stop reason: HOSPADM

## 2023-05-27 RX ORDER — LORAZEPAM 2 MG/ML
1 INJECTION INTRAMUSCULAR EVERY 4 HOURS PRN
Status: DISCONTINUED | OUTPATIENT
Start: 2023-05-27 | End: 2023-06-01 | Stop reason: HOSPADM

## 2023-05-27 RX ADMIN — HYDRALAZINE HYDROCHLORIDE 50 MG: 25 TABLET ORAL at 12:05

## 2023-05-27 RX ADMIN — CASTOR OIL AND BALSAM, PERU: 788; 87 OINTMENT TOPICAL at 08:05

## 2023-05-27 RX ADMIN — IPRATROPIUM BROMIDE AND ALBUTEROL SULFATE 3 ML: .5; 3 SOLUTION RESPIRATORY (INHALATION) at 02:05

## 2023-05-27 RX ADMIN — DILTIAZEM HYDROCHLORIDE 120 MG: 120 CAPSULE, COATED, EXTENDED RELEASE ORAL at 12:05

## 2023-05-27 RX ADMIN — MUPIROCIN 1 G: 20 OINTMENT TOPICAL at 09:05

## 2023-05-27 RX ADMIN — MORPHINE SULFATE 2 MG: 2 INJECTION, SOLUTION INTRAMUSCULAR; INTRAVENOUS at 05:05

## 2023-05-27 RX ADMIN — METOPROLOL TARTRATE 50 MG: 50 TABLET, FILM COATED ORAL at 09:05

## 2023-05-27 RX ADMIN — IPRATROPIUM BROMIDE AND ALBUTEROL SULFATE 3 ML: .5; 3 SOLUTION RESPIRATORY (INHALATION) at 07:05

## 2023-05-27 RX ADMIN — MORPHINE SULFATE 2 MG: 2 INJECTION, SOLUTION INTRAMUSCULAR; INTRAVENOUS at 11:05

## 2023-05-27 RX ADMIN — METHYLPREDNISOLONE SODIUM SUCCINATE 40 MG: 40 INJECTION, POWDER, FOR SOLUTION INTRAMUSCULAR; INTRAVENOUS at 03:05

## 2023-05-27 RX ADMIN — HEPARIN SODIUM 12 UNITS/KG/HR: 10000 INJECTION, SOLUTION INTRAVENOUS at 05:05

## 2023-05-27 RX ADMIN — PANTOPRAZOLE SODIUM 40 MG: 40 INJECTION, POWDER, FOR SOLUTION INTRAVENOUS at 08:05

## 2023-05-27 RX ADMIN — ALLOPURINOL 50 MG: 100 TABLET ORAL at 08:05

## 2023-05-27 RX ADMIN — ASPIRIN 81 MG CHEWABLE TABLET 81 MG: 81 TABLET CHEWABLE at 08:05

## 2023-05-27 RX ADMIN — ATORVASTATIN CALCIUM 40 MG: 40 TABLET, FILM COATED ORAL at 08:05

## 2023-05-27 RX ADMIN — RANOLAZINE 500 MG: 500 TABLET, EXTENDED RELEASE ORAL at 08:05

## 2023-05-27 RX ADMIN — HYDRALAZINE HYDROCHLORIDE 25 MG: 25 TABLET ORAL at 09:05

## 2023-05-27 RX ADMIN — MORPHINE SULFATE 2 MG: 2 INJECTION, SOLUTION INTRAMUSCULAR; INTRAVENOUS at 03:05

## 2023-05-27 RX ADMIN — LEVOFLOXACIN 250 MG: 250 INJECTION, SOLUTION INTRAVENOUS at 10:05

## 2023-05-27 RX ADMIN — CEFTRIAXONE SODIUM 1 G: 1 INJECTION, POWDER, FOR SOLUTION INTRAMUSCULAR; INTRAVENOUS at 07:05

## 2023-05-27 RX ADMIN — METHYLPREDNISOLONE SODIUM SUCCINATE 40 MG: 40 INJECTION, POWDER, FOR SOLUTION INTRAMUSCULAR; INTRAVENOUS at 05:05

## 2023-05-27 RX ADMIN — CASTOR OIL AND BALSAM, PERU: 788; 87 OINTMENT TOPICAL at 09:05

## 2023-05-27 RX ADMIN — LORAZEPAM 1 MG: 2 INJECTION INTRAMUSCULAR; INTRAVENOUS at 05:05

## 2023-05-27 RX ADMIN — CHLORHEXIDINE GLUCONATE 15 ML: 1.2 RINSE ORAL at 08:05

## 2023-05-27 RX ADMIN — MUPIROCIN 1 G: 20 OINTMENT TOPICAL at 08:05

## 2023-05-27 RX ADMIN — METHYLPREDNISOLONE SODIUM SUCCINATE 40 MG: 40 INJECTION, POWDER, FOR SOLUTION INTRAMUSCULAR; INTRAVENOUS at 10:05

## 2023-05-27 RX ADMIN — RANOLAZINE 500 MG: 500 TABLET, EXTENDED RELEASE ORAL at 09:05

## 2023-05-27 RX ADMIN — METOPROLOL TARTRATE 50 MG: 50 TABLET, FILM COATED ORAL at 08:05

## 2023-05-27 RX ADMIN — CHLORHEXIDINE GLUCONATE 15 ML: 1.2 RINSE ORAL at 09:05

## 2023-05-27 NOTE — PROGRESS NOTES
"Pulmonary/Critical Care  Progress Note      Patient name: Marisol Kerr  MRN: [id number]  Date: 05/27/2023    Admit Date: 5/25/2023  Consult Requested By: Vamshi Young MD    Reason for Consult: AECOPD, respiratory failure    HPI:    5/26/2023 - 76 yo ho COPD (severe, home O2), ASCVD, diastolic heart failure, CKD3, obesity with several admissions this year came to ER with increased SOB, worsened saturations.  Was admitted and placed on BiPAP.  Overnight she has done OK and this AM we tried to take her off BiPAP and after a short while she developed increased SOB, felt "like I'm going to die", she was placed back on BiPAP, got some morphine and was started on precedex due to anxiety.  I came back to revisit her and she indicates that if she worsens she would like to be intubated.  ROS is difficult due to hearing.  CT chest reviewed.  EKG noted has elevated QTc    5/27/2023 - Looks and feels better this AM, off BiPAP and breathing is much more comfortable.  No new complaints.  She does not show much insight into her condition.  Creatinine has increased    Review of Systems    Review of Systems   Constitutional:  Positive for malaise/fatigue. Negative for chills, diaphoresis, fever and weight loss.   HENT:  Positive for hearing loss. Negative for congestion.    Eyes:  Negative for pain.   Respiratory:  Positive for shortness of breath and wheezing. Negative for cough, hemoptysis, sputum production and stridor.    Cardiovascular:  Positive for chest pain ("heavy"). Negative for palpitations, orthopnea, claudication, leg swelling and PND.   Gastrointestinal:  Negative for abdominal pain, blood in stool, constipation, diarrhea, heartburn, nausea and vomiting.   Genitourinary:  Negative for dysuria, frequency, hematuria and urgency.   Musculoskeletal:  Negative for falls and myalgias.   Neurological:  Positive for weakness. Negative for sensory change and focal weakness.   Psychiatric/Behavioral:  Negative for " depression, substance abuse and suicidal ideas. The patient is not nervous/anxious.      Past Medical History    Past Medical History:   Diagnosis Date    CAD (coronary artery disease)     Cancer     colon    CHF (congestive heart failure)     Colitis     Colon cancer     COPD (chronic obstructive pulmonary disease)     Decreased hearing     Diabetes mellitus     Diabetes mellitus, type 2     Hypercholesterolemia     Hypertension     Hypoxemia     Insomnia     Obesity     Osteoarthritis        Past Surgical History    Past Surgical History:   Procedure Laterality Date    ANGIOGRAM, CORONARY, WITH LEFT HEART CATHETERIZATION N/A 2/10/2022    Procedure: Angiogram, Coronary, with Left Heart Cath;  Surgeon: Cristian Benjamin MD;  Location: [facility] CATH/EP LAB;  Service: Cardiology;  Laterality: N/A;    CARDIAC CATHETERIZATION      CHOLECYSTECTOMY      COLECTOMY      CORONARY ANGIOPLASTY      CORONARY STENT PLACEMENT      ERCP N/A 1/16/2023    Procedure: ERCP (ENDOSCOPIC RETROGRADE CHOLANGIOPANCREATOGRAPHY);  Surgeon: Biju Kramer III, MD;  Location: [facility] ENDO;  Service: Endoscopy;  Laterality: N/A;    ESOPHAGOGASTRODUODENOSCOPY N/A 1/29/2023    Procedure: EGD (ESOPHAGOGASTRODUODENOSCOPY);  Surgeon: Aarti Alvarez MD;  Location: [facility];  Service: Endoscopy;  Laterality: N/A;    EXTERNAL EAR SURGERY      EYE SURGERY      FLEXIBLE SIGMOIDOSCOPY N/A 9/19/2019    Procedure: SIGMOIDOSCOPY, FLEXIBLE;  Surgeon: Biju rKamer III, MD;  Location: [facility];  Service: Endoscopy;  Laterality: N/A;    HYSTERECTOMY      partial       Medications (scheduled):      albuterol-ipratropium  3 mL Nebulization Q6H    allopurinoL  50 mg Oral Daily    aspirin  81 mg Oral Daily    atorvastatin  40 mg Oral Daily    balsam peru-castor oiL   Topical (Top) BID    cefTRIAXone (ROCEPHIN) IVPB  1 g Intravenous Q24H    chlorhexidine  15 mL Mouth/Throat BID    diltiaZEM  120 mg Oral Daily    hydrALAZINE  50 mg Oral Q12H     "levoFLOXacin  250 mg Intravenous Q24H    methylPREDNISolone sodium succinate injection  40 mg Intravenous Q8H    metoprolol tartrate  50 mg Oral BID    mupirocin   Nasal BID    pantoprazole  40 mg Intravenous Daily    ranolazine  500 mg Oral BID       Medications (infusions):      dexmedeTOMIDine (Precedex) infusion (titrating) Stopped (23 0100)    heparin (porcine) in D5W 12 Units/kg/hr (23 0600)    nitroGLYCERIN Stopped (23 1145)       Medications (prn):     dextrose 50%, dextrose 50%, glucagon (human recombinant), glucagon (human recombinant), glucose, glucose, heparin (PORCINE), heparin (PORCINE), hydrALAZINE, midodrine, morphine, naloxone, potassium bicarbonate, potassium bicarbonate, potassium bicarbonate, sodium chloride 0.9%    Family History:   Family History   Problem Relation Age of Onset    Febrile seizures Mother     Heart failure Father        Social History: Tobacco:   Social History     Tobacco Use   Smoking Status Former    Packs/day: 3.00    Years: 50.00    Pack years: 150.00    Types: Cigarettes    Start date: 3/30/1964    Quit date: 2006    Years since quittin.2   Smokeless Tobacco Never   Tobacco Comments    ex smoker 15 years                                EtOH:   Social History     Substance and Sexual Activity   Alcohol Use Yes                                Drugs:   Social History     Substance and Sexual Activity   Drug Use No       Physical Exam    Vital signs:  Temp:  [97.6 °F (36.4 °C)-98.7 °F (37.1 °C)]   Pulse:  [48-91]   Resp:  [15-44]   BP: ()/()   SpO2:  [92 %-100 %]     Intake/Output:   Intake/Output Summary (Last 24 hours) at 2023 1154  Last data filed at 2023 0640  Gross per 24 hour   Intake 414.85 ml   Output 1250 ml   Net -835.15 ml          BMI: Estimated body mass index is 26.45 kg/m² as calculated from the following:    Height as of this encounter: 5' 3.5" (1.613 m).    Weight as of this encounter: 68.8 kg (151 lb 10.8 " oz).    Physical Exam  Vitals and nursing note reviewed.   Constitutional:       General: She is not in acute distress.     Appearance: Normal appearance. She is obese. She is ill-appearing (chronically). She is not toxic-appearing or diaphoretic.      Comments: Looks better today   HENT:      Head: Normocephalic and atraumatic.      Right Ear: External ear normal.      Left Ear: External ear normal.      Nose: Nose normal. No congestion or rhinorrhea.      Mouth/Throat:      Mouth: Mucous membranes are moist.      Pharynx: Oropharynx is clear. No oropharyngeal exudate or posterior oropharyngeal erythema.   Eyes:      General: No scleral icterus.        Right eye: No discharge.         Left eye: No discharge.      Extraocular Movements: Extraocular movements intact.      Conjunctiva/sclera: Conjunctivae normal.      Pupils: Pupils are equal, round, and reactive to light.   Neck:      Vascular: No carotid bruit.   Cardiovascular:      Rate and Rhythm: Normal rate and regular rhythm.      Pulses: Normal pulses.      Heart sounds: No murmur heard.    No friction rub. No gallop.   Pulmonary:      Effort: Pulmonary effort is normal. No respiratory distress.      Breath sounds: No stridor. No wheezing, rhonchi or rales.      Comments: Decreased BS throughout  + Friedman's sign  Labored at times  Chest:      Chest wall: No tenderness.   Abdominal:      General: Bowel sounds are normal. There is no distension.      Tenderness: There is no abdominal tenderness. There is no guarding.      Comments: Obese    Musculoskeletal:         General: No swelling. Normal range of motion.      Cervical back: Normal range of motion and neck supple. No rigidity or tenderness.      Right lower leg: No edema.      Left lower leg: No edema.   Lymphadenopathy:      Cervical: No cervical adenopathy.   Skin:     General: Skin is warm and dry.      Capillary Refill: Capillary refill takes less than 2 seconds.      Coloration: Skin is not jaundiced.       Findings: No bruising.   Neurological:      General: No focal deficit present.      Mental Status: She is alert and oriented to person, place, and time. Mental status is at baseline.      Cranial Nerves: No cranial nerve deficit.      Sensory: No sensory deficit.      Motor: No weakness.      Comments: [facility]   Psychiatric:         Mood and Affect: Mood normal.         Behavior: Behavior normal.         Thought Content: Thought content normal.         Judgment: Judgment normal.      Comments: Anxious        Laboratory    Recent Labs   Lab 05/27/23 0447   WBC 8.29   RBC 3.68*   HGB 9.8*   HCT 33.0*      MCV 90   MCH 26.6*   MCHC 29.7*         Recent Labs   Lab 05/27/23 0447   CALCIUM 8.4*   PROT 5.7*      K 4.1   CO2 29   CL 99   BUN 45*   CREATININE 2.2*   ALKPHOS 77   ALT 32   AST 45*   BILITOT 0.9         Recent Labs   Lab 05/27/23 0447   APTT 46.2*         No results for input(s): CPK, CPKMB, TROPONINI, MB in the last 24 hours.    Additional labs: reviewed    Microbiology:       Microbiology Results (last 7 days)       ** No results found for the last 168 hours. **            Radiology    Echo  · The left ventricle is normal in size with concentric remodeling and   normal systolic function.  · The estimated ejection fraction is 65%.  · Indeterminate left ventricular diastolic function.  · Mild to moderate tricuspid regurgitation.  · Mild pulmonic regurgitation.  · Normal right ventricular size with normal right ventricular systolic   function.  · Normal central venous pressure (3 mmHg).  · The estimated PA systolic pressure is 32 mmHg.  · Mild mitral regurgitation.     X-Ray Chest AP Portable  CLINICAL HISTORY:  77 years (1945) Female sob Shortness of breath; Hx of cancer, chf, cad, copd, diabetes, htn    TECHNIQUE:  Portable AP radiograph the chest.    COMPARISON:  Radiograph from May 25, 2023    FINDINGS:  There are unchanged faint linear opacities at the lung bases suggestive of  atelectasis/scarring, aspiration/pneumonia or trace edema in the appropriate clinical setting. There is blunting of the costophrenic angles suggestive of either atelectasis, scarring or trace pleural fluid. There is an ill-defined masslike density posterior to the right hilum highly suspicious for malignancy. The heart is normal in size. Atheromatous calcifications are seen at the aortic arch. Osseous structures show degenerative disc disease and degenerative changes in the shoulders. The visualized upper abdomen is unremarkable.    IMPRESSION:  Unchanged radiograph of the chest when accounting for differences in imaging technique.    Electronically signed by:  García Russell MD  5/26/2023 10:29 AM CDT Workstation: [id number]  US Lower Extremity Veins Right  US LOWER EXTREMITY VEINS LIMITED FOLLOW-UP UNILATERAL    Interpretation time/date:  5/26/2023 6:28 AM CDT    History:RLE edema    Comparison: None    Findings: Routine venous ultrasound with color-flow Doppler was performed of the left lower extremity. Deep venous structures of the calf and thigh are patent without acute or chronic DVT observed. There is normal phasicity without gross reflux. The great saphenous vein appears patent. Incidentally a arterial occlusion of the SFA is suggested with common femoral artery mid vessel stenosis.    Impression: No evidence of left lower extremity DVT. 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    Electronically signed by:  Levar Saab MD  5/26/2023 6:29 AM CDT Workstation: [id number]  CT Chest Without Contrast  EXAM:  CT Chest Without Intravenous Contrast    CLINICAL HISTORY:  The patient is 77 years old and is Female; sob    TECHNIQUE:  Axial computed tomography images of the chest without intravenous contrast.  Sagittal and coronal reformatted images were created and reviewed.  This CT exam was performed using one or more of the following dose reduction techniques:   automated exposure control, adjustment of the mA and/or kV according to patient size, and/or use of iterative reconstruction technique.    COMPARISON:  No relevant prior studies available.    FINDINGS:  LUNGS:  A spiculated mass within the right lower lobe measuring 3.0 x 2.5 cm is present. The lungs are hyperinflated with mild emphysematous change. Minimal bronchial wall thickening of the lower lobe bronchi is noted with adjacent mild surrounding inflammation. Groundglass opacity within the left upper lobe is present.  PLEURAL SPACE:  Unremarkable.  No pneumothorax.  No significant effusion.  HEART:  No cardiomegaly.  No pericardial effusion.  BONES/JOINTS:  Multilevel degenerative change of the spine is present.  SOFT TISSUES:  The soft tissues are normal.  VASCULATURE:  Atherosclerosis of the aorta is present.  No thoracic aortic aneurysm.  LYMPH NODES:  Unremarkable. No enlarged lymph nodes.  TUBES, LINES AND DEVICES:  A right chest port is present with the tip in the SVC.    IMPRESSION:  1.  Spiculated mass within the right lower lobe. Consider a non-contrast Chest CT at 3 months, a PET/CT, or tissue sampling.    2.  Findings suggest mild bronchitis with developing infiltrate in the left upper lobe.    Electronically signed by:  Erica Ornelas MD  5/26/2023 12:53 AM CDT Workstation: [id number]        Additional Studies    reviewed    Ventilator Information    Oxygen Concentration (%):  [30] 30             Recent Labs     05/26/23  0810   PH 7.388   PCO2 53.2*   PO2 76*   HCO3 32.1*   POCSATURATED 94*   BE 7           Impression    Active Hospital Problems    Diagnosis  POA    *Shortness of breath [R06.02]  Unknown    Acute on chronic respiratory failure with hypoxia and hypercapnia [J96.21, J96.22]  Yes    Prolonged QT interval [R94.31]  Unknown    COPD exacerbation [J44.1]  Yes    Hard of hearing [H91.90]  Yes     Chronic    COPD/emphysema [J44.9]  Yes     Chronic    Chronic kidney disease, stage 4 (severe)  [N18.4]  Yes    DM type 2, controlled, with complication [E11.8]  Yes    Essential hypertension, benign [I10]  Yes      Resolved Hospital Problems   No resolved problems to display.       Plan    BiPAP as needed and when sleeping and O2 as able  Steroids, respiratory treatments, antibiotics  Keep in ICU today  Precedex to help with anxiety - wean off as able, use prn ativan  Prn morphine for air hunger  Cardiology following  Heparin drip started per hospitalist - not sure what we are treating here - ? Change to prophylaxis dose lovenox  Clinically I doubt PE and do not feel we should do CTA - has elevated creatinine  Stop NTG drip  I am not convinced that there is an acute cardiac issues here  Watch QTc  Watch renal function - I suspect Cr increased due to diuresis  High risk of decompensation but better today    Thank you for this consult.  I will follow with you while the patient is hospitalized.      Critical Care Time    I have spent > 35 minutes providing critical care services for this pt for the above diagnoses.  These services have included pt evaluation, pt exam, BiPAP/oxygenation assessment, discussions with staff, chart review, data review, note preparation and .  Medications have been reviewed and adjusted as needed.  The patient has life threatening illness with a high risk of decompensation and/or death.      Dereje Saul MD  [facility] Pulmonary/Critical Care

## 2023-05-27 NOTE — PROGRESS NOTES
[facility] Medicine  Progress Note    Patient Name: Marisol Kerr  MRN: [id number]  Patient Class: IP- Inpatient   Admission Date: 5/25/2023  Length of Stay: 1 days  Attending Physician: Vamshi Young MD  Primary Care Provider: Khadar Dwyer MD        Subjective:     Principal Problem:Shortness of breath    Interval History:  Patient is more was very short of breath, stating I can not breathe.  Hypertensive emergency, placed on nitroglycerin drip, Precedex drip.  D-dimer elevated, unfortunately cannot take down for a V/Q scan as to ill, cannot do PE study given renal function.    Review of Systems  Objective:     Vital Signs (Most Recent):  Temp: 97.8 °F (36.6 °C) (05/26/23 1501)  Pulse: 87 (05/26/23 1923)  Resp: (!) 36 (05/26/23 1923)  BP: (!) 183/71 (05/26/23 1800)  SpO2: (!) 94 % (05/26/23 1923) Vital Signs (24h Range):  Temp:  [97 °F (36.1 °C)-98.1 °F (36.7 °C)] 97.8 °F (36.6 °C)  Pulse:  [64-91] 87  Resp:  [20-45] 36  SpO2:  [92 %-100 %] 94 %  BP: ()/() 183/71     Weight: 68.8 kg (151 lb 10.8 oz)  Body mass index is 26.45 kg/m².    Intake/Output Summary (Last 24 hours) at 5/26/2023 1924  Last data filed at 5/26/2023 1900  Gross per 24 hour   Intake 335.4 ml   Output 1100 ml   Net -764.6 ml      Physical Exam  Physical Exam     Nursing note and vitals reviewed.  Constitutional: She is not diaphoretic.  Confused and difficult to arouse.  On BiPAP.  HENT:   Head: Normocephalic and atraumatic.   Eyes: Conjunctivae are normal.   Neck:   Normal range of motion.  Cardiovascular:  Normal rate.           Pulmonary/Chest:   Poor air movement with diffuse inspiratory and expiratory rhonchi.  Positive JVD.   Abdominal: Abdomen is soft. There is no abdominal tenderness.   Musculoskeletal:         General: Normal range of motion.      Cervical back: Normal range of motion.      Neurological: She is alert. No sensory deficit.   Moves all extremities equally   Skin: No rash  noted.   Psychiatric:   On initial examination patient has difficulty answer questions due to shortness of breath and respiratory distress.         Significant Labs: All pertinent labs within the past 24 hours have been reviewed.    Significant Imaging: I have reviewed all pertinent imaging results/findings within the past 24 hours.    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.9] 01/16/2019 Yes     Chronic    Chronic kidney disease, stage 4 (severe) [N18.4] 10/08/2014 Yes    DM type 2, controlled, with complication [E11.8] 06/03/2013 Yes    Essential hypertension, benign [I10] 04/09/2013 Yes      Problems Resolved During this Admission:     VTE Risk Mitigation (From admission, onward)           Ordered     heparin 25,000 units in dextrose 5% (100 units/ml) IV bolus from bag - ADDITIONAL PRN BOLUS - 60 units/kg (max bolus 4000 units)  As needed (PRN)        Question:  Heparin Infusion Adjustment (DO NOT MODIFY ANSWER)  Answer:  \\[email or web address]\Images\Pharmacy\HeparinInfusions\heparin LOW INTENSITY nomogram for [facility] [id number]    05/26/23 1025     heparin 25,000 units in dextrose 5% (100 units/ml) IV bolus from bag - ADDITIONAL PRN BOLUS - 30 units/kg (max bolus 4000 units)  As needed (PRN)        Question:  Heparin Infusion Adjustment (DO NOT MODIFY ANSWER)  Answer:  \\[email or web address]\Images\Pharmacy\HeparinInfusions\heparin LOW INTENSITY nomogram for [facility] [id number].pdf    05/26/23 1025     heparin 25,000 units in dextrose 5% 250 mL (100 units/mL) infusion LOW INTENSITY nomogram - [facility]  Continuous        Question Answer Comment   Heparin Infusion Adjustment (DO NOT MODIFY ANSWER)  [email or web address]\Images\Pharmacy\HeparinInfusions\heparin LOW INTENSITY nomogram for [facility] [id number]    Begin at (in units/kg/hr) 12        05/26/23 1025     IP VTE HIGH RISK PATIENT  Once         05/25/23 1814     Place sequential compression device  Until discontinued         05/25/23 1814                                      Acute hypoxemic and hypercapnic respiratory failure, likely secondary to CHF, very severe COPD, NSTEMI  -continue BiPAP  -Lasix IV 40mg QD  -Cardiology, Pulmonary consult  -NTG drip, heparin drip  -empiric antibiotics for possible pneumonia    Prolonged QTC  -monitor on tele, avoid QTC prolonging agents       History of multivessel CAD with stent  Abnormal EKG with ST depressions  -She had left heart catheterization 2/2022 severe triple-vessel disease and had complication with rectus sheet hematoma/bleeding and transferred to [facility] for IR intervention and embolization.  She was deemed too high risk for CABG.  She states that she was told she was too high risk for PCI, they are not planning on doing an angiogram for high risk PCI, and she will be medically managed  - trend troponins  -Continue aspirin and Brilinta     Metabolic encephalopathy  Precedex drip     CKD 3-monitor ins and outs, avoid nephrotoxic agents     [id number]      Heparin drip will also serve as VTE prophylaxis, ppi    Restart home medications as ordered  Consider palliative care    Vamshi Young MD  Department of Hospital Medicine   Count includes the [facility]

## 2023-05-27 NOTE — CARE UPDATE
05/26/23 1923   Patient Assessment/Suction   Level of Consciousness (AVPU) alert   Respiratory Effort Short of breath   Expansion/Accessory Muscles/Retractions no use of accessory muscles   All Lung Fields Breath Sounds diminished   Rhythm/Pattern, Respiratory tachypneic   Cough Frequency no cough   Skin Integrity   $ Wound Care Tech Time 15 min   Area Observed Bridge of nose   Skin Appearance redness blanchable   Barrier used? Gel Cushion   PRE-TX-O2   Device (Oxygen Therapy) nasal cannula   $ Is the patient on Low Flow Oxygen? Yes   Flow (L/min) 3   SpO2 (!) 94 %   Pulse Oximetry Type Continuous   $ Pulse Oximetry - Multiple Charge Pulse Oximetry - Multiple   Pulse 87   Resp (!) 36   Aerosol Therapy   $ Aerosol Therapy Charges Aerosol Treatment   Daily Review of Necessity (SVN) completed   Respiratory Treatment Status (SVN) given   Treatment Route (SVN) mask   Patient Position (SVN) HOB elevated   Post Treatment Assessment (SVN) breath sounds unchanged;vital signs unchanged   Signs of Intolerance (SVN) none   Education   $ Education Bronchodilator;15 min   Respiratory Evaluation   $ Care Plan Tech Time 15 min

## 2023-05-27 NOTE — PROGRESS NOTES
[facility] Medicine  Progress Note    Patient Name: Marisol Kerr  MRN: [id number]  Patient Class: IP- Inpatient   Admission Date: 5/25/2023  Length of Stay: 2 days  Attending Physician: Vamshi Young MD  Primary Care Provider: Khadar Dwyer MD        Subjective:     Principal Problem:Shortness of breath    Interval History:  Patient is improved today, still with labored breathing.  EKG with new septal infarct.  She is down to 2 L of oxygen today.  Review of Systems  Objective:     Vital Signs (Most Recent):  Temp: 98 °F (36.7 °C) (05/27/23 1101)  Pulse: 75 (05/27/23 1435)  Resp: (!) 30 (05/27/23 1513)  BP: (!) 129/59 (05/27/23 1300)  SpO2: 98 % (05/27/23 1435) Vital Signs (24h Range):  Temp:  [97.6 °F (36.4 °C)-98.7 °F (37.1 °C)] 98 °F (36.7 °C)  Pulse:  [48-91] 75  Resp:  [15-42] 30  SpO2:  [92 %-100 %] 98 %  BP: ()/() 129/59     Weight: 68.8 kg (151 lb 10.8 oz)  Body mass index is 26.45 kg/m².    Intake/Output Summary (Last 24 hours) at 5/27/2023 1606  Last data filed at 5/27/2023 0640  Gross per 24 hour   Intake 414.85 ml   Output 1250 ml   Net -835.15 ml        Physical Exam  Physical Exam     Nursing note and vitals reviewed.  Constitutional: She is not diaphoretic.  Confused and difficult to arouse.  On BiPAP.  HENT:   Head: Normocephalic and atraumatic.   Eyes: Conjunctivae are normal.   Neck:   Normal range of motion.  Cardiovascular:  Normal rate.           Pulmonary/Chest:   Poor air movement with diffuse inspiratory and expiratory rhonchi.  Positive JVD.   Abdominal: Abdomen is soft. There is no abdominal tenderness.   Musculoskeletal:         General: Normal range of motion.      Cervical back: Normal range of motion.      Neurological: She is alert. No sensory deficit.   Moves all extremities equally   Skin: No rash noted.   Psychiatric:   On initial examination patient has difficulty answer questions due to shortness of breath and respiratory distress.          Significant Labs: All pertinent labs within the past 24 hours have been reviewed.    Significant Imaging: I have reviewed all pertinent imaging results/findings within the past 24 hours.    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] 01/16/2019 Yes     Chronic    Chronic kidney disease, stage 4 (severe) [N18.4] 10/08/2014 Yes    DM type 2, controlled, with complication [E11.8] 06/03/2013 Yes    Essential hypertension, benign [I10] 04/09/2013 Yes      Problems Resolved During this Admission:     VTE Risk Mitigation (From admission, onward)           Ordered     heparin 25,000 units in dextrose 5% (100 units/ml) IV bolus from bag - ADDITIONAL PRN BOLUS - 60 units/kg (max bolus 4000 units)  As needed (PRN)        Question:  Heparin Infusion Adjustment (DO NOT MODIFY ANSWER)  Answer:  \\[email or web address]\epic\Images\Pharmacy\HeparinInfusions\heparin LOW INTENSITY nomogram for [facility] [id number].pdf    05/26/23 1025     heparin 25,000 units in dextrose 5% (100 units/ml) IV bolus from bag - ADDITIONAL PRN BOLUS - 30 units/kg (max bolus 4000 units)  As needed (PRN)        Question:  Heparin Infusion Adjustment (DO NOT MODIFY ANSWER)  Answer:  \[email or web address]\epic\Images\Pharmacy\HeparinInfusions\heparin LOW INTENSITY nomogram for [facility] [id number].pdf    05/26/23 1025     heparin 25,000 units in dextrose 5% 250 mL (100 units/mL) infusion LOW INTENSITY nomogram - [facility]  Continuous        Question Answer Comment   Heparin Infusion Adjustment (DO NOT MODIFY ANSWER) \\[email or web address]\epic\Images\Pharmacy\HeparinInfusions\heparin LOW INTENSITY nomogram for [facility] [id number].pdf    Begin  at (in units/kg/hr) 12        05/26/23 1025     IP VTE HIGH RISK PATIENT  Once         05/25/23 1814     Place sequential compression device  Until discontinued         05/25/23 1814                                      Acute hypoxemic and hypercapnic respiratory failure, likely secondary to CHF, very severe COPD, NSTEMI  -continue BiPAP  -hold Lasix for now, patient has diuresed well, some hypotension  -Cardiology, Pulmonary consult appreciated  -NTG drip, heparin drip x 48 hours  -empiric antibiotics for possible pneumonia    Prolonged QTC  -monitor on tele, avoid QTC prolonging agents       History of multivessel CAD with stent  Abnormal EKG with ST depressions  -She had left heart catheterization 2/2022 severe triple-vessel disease and had complication with rectus sheet hematoma/bleeding and [place] [facility] [place].  She was deemed too high risk for CABG.  She states that she was told she was too high risk for PCI, they are not planning on doing an angiogram for high risk PCI, and she will be medically managed  - trend troponins  -Continue aspirin and hold Brilinta while on heparin gtt for 48 hours     Metabolic encephalopathy  Precedex drip     CKD 3-monitor ins and outs, avoid nephrotoxic agents     T5DM-DNM      Heparin drip will also serve as VTE prophylaxis, ppi    Restart home medications as ordered  Consider palliative care    Vamshi Young MD  Department of Hospital Medicine   Critical access hospital

## 2023-05-27 NOTE — CARE UPDATE
05/27/23 0753   Patient Assessment/Suction   Level of Consciousness (AVPU) alert   Respiratory Effort Short of breath   Expansion/Accessory Muscles/Retractions no use of accessory muscles;no retractions;expansion symmetric   All Lung Fields Breath Sounds Anterior:;Lateral:;diminished   Rhythm/Pattern, Respiratory tachypneic   Skin Integrity   $ Wound Care Tech Time 15 min   Area Observed Left;Right;Behind ear;Cheek;Bridge of nose   Skin Appearance redness blanchable   PRE-TX-O2   Device (Oxygen Therapy) high flow nasal cannula   $ Is the patient on Low Flow Oxygen? Yes   Flow (L/min) 2   SpO2 97 %   Pulse Oximetry Type Continuous   $ Pulse Oximetry - Multiple Charge Pulse Oximetry - Multiple   Pulse 79   Resp 20   Aerosol Therapy   $ Aerosol Therapy Charges Aerosol Treatment   Daily Review of Necessity (SVN) completed   Respiratory Treatment Status (SVN) given   Treatment Route (SVN) in-line   Patient Position (SVN) HOB elevated   Post Treatment Assessment (SVN) breath sounds unchanged   Signs of Intolerance (SVN) none   Preset CPAP/BiPAP Settings   Mode Of Delivery BiPAP;Standby   $ CPAP/BiPAP Daily Charge BiPAP/CPAP Daily   Education   $ Education BiPAP;Bronchodilator;COPD;30 min   Respiratory Evaluation   $ Care Plan Tech Time 15 min   $ Eval/Re-eval Charges Re-evaluation

## 2023-05-27 NOTE — PLAN OF CARE
Problem: Adult Inpatient Plan of Care  Goal: Plan of Care Review  Outcome: Ongoing, Progressing  Goal: Patient-Specific Goal (Individualized)  Outcome: Ongoing, Progressing  Goal: Absence of Hospital-Acquired Illness or Injury  Outcome: Ongoing, Progressing  Goal: Optimal Comfort and Wellbeing  Outcome: Ongoing, Progressing  Goal: Readiness for Transition of Care  Outcome: Ongoing, Progressing     Problem: Diabetes Comorbidity  Goal: Blood Glucose Level Within Targeted Range  Outcome: Ongoing, Progressing     Problem: Impaired Wound Healing  Goal: Optimal Wound Healing  Outcome: Ongoing, Progressing     Problem: Skin Injury Risk Increased  Goal: Skin Health and Integrity  Outcome: Ongoing, Progressing     Problem: Fall Injury Risk  Goal: Absence of Fall and Fall-Related Injury  Outcome: Ongoing, Progressing     Problem: Coping Ineffective  Goal: Effective Coping  Outcome: Ongoing, Progressing     Problem: Oral Intake Inadequate  Goal: Improved Oral Intake  Outcome: Ongoing, Progressing

## 2023-05-28 LAB
ALBUMIN SERPL BCP-MCNC: 2.6 G/DL (ref 3.5–5.2)
ALP SERPL-CCNC: 77 U/L (ref 55–135)
ALT SERPL W/O P-5'-P-CCNC: 27 U/L (ref 10–44)
ANION GAP SERPL CALC-SCNC: 8 MMOL/L (ref 8–16)
AST SERPL-CCNC: 24 U/L (ref 10–40)
BASOPHILS # BLD AUTO: 0 K/UL (ref 0–0.2)
BASOPHILS NFR BLD: 0 % (ref 0–1.9)
BILIRUB SERPL-MCNC: 0.6 MG/DL (ref 0.1–1)
BUN SERPL-MCNC: 57 MG/DL (ref 8–23)
CALCIUM SERPL-MCNC: 8.3 MG/DL (ref 8.7–10.5)
CHLORIDE SERPL-SCNC: 100 MMOL/L (ref 95–110)
CO2 SERPL-SCNC: 31 MMOL/L (ref 23–29)
CREAT SERPL-MCNC: 2.1 MG/DL (ref 0.5–1.4)
DIFFERENTIAL METHOD: ABNORMAL
EOSINOPHIL # BLD AUTO: 0 K/UL (ref 0–0.5)
EOSINOPHIL NFR BLD: 0 % (ref 0–8)
ERYTHROCYTE [DISTWIDTH] IN BLOOD BY AUTOMATED COUNT: 16.9 % (ref 11.5–14.5)
EST. GFR  (NO RACE VARIABLE): 24.1 ML/MIN/1.73 M^2
GLUCOSE SERPL-MCNC: 136 MG/DL (ref 70–110)
GLUCOSE SERPL-MCNC: 166 MG/DL (ref 70–110)
HCT VFR BLD AUTO: 32.1 % (ref 37–48.5)
HGB BLD-MCNC: 9.7 G/DL (ref 12–16)
IMM GRANULOCYTES # BLD AUTO: 0.04 K/UL (ref 0–0.04)
IMM GRANULOCYTES NFR BLD AUTO: 0.5 % (ref 0–0.5)
LYMPHOCYTES # BLD AUTO: 0.6 K/UL (ref 1–4.8)
LYMPHOCYTES NFR BLD: 6.6 % (ref 18–48)
MCH RBC QN AUTO: 27.1 PG (ref 27–31)
MCHC RBC AUTO-ENTMCNC: 30.2 G/DL (ref 32–36)
MCV RBC AUTO: 90 FL (ref 82–98)
MONOCYTES # BLD AUTO: 0.2 K/UL (ref 0.3–1)
MONOCYTES NFR BLD: 2.5 % (ref 4–15)
NEUTROPHILS # BLD AUTO: 7.9 K/UL (ref 1.8–7.7)
NEUTROPHILS NFR BLD: 90.4 % (ref 38–73)
NRBC BLD-RTO: 0 /100 WBC
PLATELET # BLD AUTO: 181 K/UL (ref 150–450)
PMV BLD AUTO: 12.4 FL (ref 9.2–12.9)
POTASSIUM SERPL-SCNC: 4.3 MMOL/L (ref 3.5–5.1)
PROT SERPL-MCNC: 5.5 G/DL (ref 6–8.4)
RBC # BLD AUTO: 3.58 M/UL (ref 4–5.4)
SODIUM SERPL-SCNC: 139 MMOL/L (ref 136–145)
WBC # BLD AUTO: 8.73 K/UL (ref 3.9–12.7)

## 2023-05-28 PROCEDURE — 94640 AIRWAY INHALATION TREATMENT: CPT

## 2023-05-28 PROCEDURE — 25000003 PHARM REV CODE 250: Performed by: INTERNAL MEDICINE

## 2023-05-28 PROCEDURE — 99232 PR SUBSEQUENT HOSPITAL CARE,LEVL II: ICD-10-PCS | Mod: ,,, | Performed by: INTERNAL MEDICINE

## 2023-05-28 PROCEDURE — 63600175 PHARM REV CODE 636 W HCPCS: Performed by: INTERNAL MEDICINE

## 2023-05-28 PROCEDURE — 93010 ELECTROCARDIOGRAM REPORT: CPT | Mod: ,,, | Performed by: GENERAL PRACTICE

## 2023-05-28 PROCEDURE — 99900035 HC TECH TIME PER 15 MIN (STAT)

## 2023-05-28 PROCEDURE — 25000003 PHARM REV CODE 250

## 2023-05-28 PROCEDURE — 94660 CPAP INITIATION&MGMT: CPT

## 2023-05-28 PROCEDURE — 99233 PR SUBSEQUENT HOSPITAL CARE,LEVL III: ICD-10-PCS | Mod: ,,, | Performed by: INTERNAL MEDICINE

## 2023-05-28 PROCEDURE — 92610 EVALUATE SWALLOWING FUNCTION: CPT

## 2023-05-28 PROCEDURE — 94761 N-INVAS EAR/PLS OXIMETRY MLT: CPT

## 2023-05-28 PROCEDURE — 12000002 HC ACUTE/MED SURGE SEMI-PRIVATE ROOM

## 2023-05-28 PROCEDURE — 99232 SBSQ HOSP IP/OBS MODERATE 35: CPT | Mod: ,,, | Performed by: INTERNAL MEDICINE

## 2023-05-28 PROCEDURE — 80053 COMPREHEN METABOLIC PANEL: CPT | Performed by: INTERNAL MEDICINE

## 2023-05-28 PROCEDURE — 93010 EKG 12-LEAD: ICD-10-PCS | Mod: ,,, | Performed by: GENERAL PRACTICE

## 2023-05-28 PROCEDURE — 99900031 HC PATIENT EDUCATION (STAT)

## 2023-05-28 PROCEDURE — 25000242 PHARM REV CODE 250 ALT 637 W/ HCPCS: Performed by: INTERNAL MEDICINE

## 2023-05-28 PROCEDURE — 99233 SBSQ HOSP IP/OBS HIGH 50: CPT | Mod: ,,, | Performed by: INTERNAL MEDICINE

## 2023-05-28 PROCEDURE — 94760 N-INVAS EAR/PLS OXIMETRY 1: CPT

## 2023-05-28 PROCEDURE — 93005 ELECTROCARDIOGRAM TRACING: CPT | Performed by: GENERAL PRACTICE

## 2023-05-28 PROCEDURE — 36415 COLL VENOUS BLD VENIPUNCTURE: CPT | Performed by: INTERNAL MEDICINE

## 2023-05-28 PROCEDURE — 94799 UNLISTED PULMONARY SVC/PX: CPT

## 2023-05-28 PROCEDURE — 85025 COMPLETE CBC W/AUTO DIFF WBC: CPT | Performed by: INTERNAL MEDICINE

## 2023-05-28 PROCEDURE — 27000221 HC OXYGEN, UP TO 24 HOURS

## 2023-05-28 RX ORDER — ENOXAPARIN SODIUM 100 MG/ML
30 INJECTION SUBCUTANEOUS EVERY 24 HOURS
Status: DISCONTINUED | OUTPATIENT
Start: 2023-05-28 | End: 2023-05-28

## 2023-05-28 RX ORDER — HEPARIN SODIUM 5000 [USP'U]/ML
5000 INJECTION, SOLUTION INTRAVENOUS; SUBCUTANEOUS EVERY 8 HOURS
Status: DISCONTINUED | OUTPATIENT
Start: 2023-05-28 | End: 2023-05-31

## 2023-05-28 RX ORDER — PANTOPRAZOLE SODIUM 40 MG/1
40 TABLET, DELAYED RELEASE ORAL DAILY
Status: DISCONTINUED | OUTPATIENT
Start: 2023-05-28 | End: 2023-06-01 | Stop reason: HOSPADM

## 2023-05-28 RX ORDER — METOCLOPRAMIDE HYDROCHLORIDE 5 MG/ML
10 INJECTION INTRAMUSCULAR; INTRAVENOUS EVERY 6 HOURS PRN
Status: DISCONTINUED | OUTPATIENT
Start: 2023-05-28 | End: 2023-05-29

## 2023-05-28 RX ORDER — CEFUROXIME AXETIL 250 MG/1
250 TABLET ORAL EVERY 12 HOURS
Status: DISCONTINUED | OUTPATIENT
Start: 2023-05-28 | End: 2023-06-01 | Stop reason: HOSPADM

## 2023-05-28 RX ADMIN — IPRATROPIUM BROMIDE AND ALBUTEROL SULFATE 3 ML: .5; 3 SOLUTION RESPIRATORY (INHALATION) at 08:05

## 2023-05-28 RX ADMIN — MUPIROCIN 1 G: 20 OINTMENT TOPICAL at 09:05

## 2023-05-28 RX ADMIN — TICAGRELOR 90 MG: 90 TABLET ORAL at 09:05

## 2023-05-28 RX ADMIN — LORAZEPAM 1 MG: 2 INJECTION INTRAMUSCULAR; INTRAVENOUS at 06:05

## 2023-05-28 RX ADMIN — IPRATROPIUM BROMIDE AND ALBUTEROL SULFATE 3 ML: .5; 3 SOLUTION RESPIRATORY (INHALATION) at 01:05

## 2023-05-28 RX ADMIN — METOPROLOL TARTRATE 50 MG: 50 TABLET, FILM COATED ORAL at 09:05

## 2023-05-28 RX ADMIN — RANOLAZINE 500 MG: 500 TABLET, EXTENDED RELEASE ORAL at 09:05

## 2023-05-28 RX ADMIN — PREDNISONE 30 MG: 20 TABLET ORAL at 08:05

## 2023-05-28 RX ADMIN — MUPIROCIN 1 G: 20 OINTMENT TOPICAL at 08:05

## 2023-05-28 RX ADMIN — CEFUROXIME AXETIL 250 MG: 250 TABLET, FILM COATED ORAL at 08:05

## 2023-05-28 RX ADMIN — HEPARIN SODIUM 5000 UNITS: 5000 INJECTION, SOLUTION INTRAVENOUS; SUBCUTANEOUS at 09:05

## 2023-05-28 RX ADMIN — DILTIAZEM HYDROCHLORIDE 120 MG: 120 CAPSULE, COATED, EXTENDED RELEASE ORAL at 08:05

## 2023-05-28 RX ADMIN — CEFUROXIME AXETIL 250 MG: 250 TABLET, FILM COATED ORAL at 09:05

## 2023-05-28 RX ADMIN — ALLOPURINOL 50 MG: 100 TABLET ORAL at 08:05

## 2023-05-28 RX ADMIN — ASPIRIN 81 MG CHEWABLE TABLET 81 MG: 81 TABLET CHEWABLE at 08:05

## 2023-05-28 RX ADMIN — ATORVASTATIN CALCIUM 40 MG: 40 TABLET, FILM COATED ORAL at 08:05

## 2023-05-28 RX ADMIN — METHYLPREDNISOLONE SODIUM SUCCINATE 40 MG: 40 INJECTION, POWDER, FOR SOLUTION INTRAMUSCULAR; INTRAVENOUS at 05:05

## 2023-05-28 RX ADMIN — MORPHINE SULFATE 2 MG: 2 INJECTION, SOLUTION INTRAMUSCULAR; INTRAVENOUS at 07:05

## 2023-05-28 RX ADMIN — HYDRALAZINE HYDROCHLORIDE 25 MG: 25 TABLET ORAL at 08:05

## 2023-05-28 RX ADMIN — CASTOR OIL AND BALSAM, PERU: 788; 87 OINTMENT TOPICAL at 08:05

## 2023-05-28 RX ADMIN — CASTOR OIL AND BALSAM, PERU: 788; 87 OINTMENT TOPICAL at 09:05

## 2023-05-28 RX ADMIN — CHLORHEXIDINE GLUCONATE 15 ML: 1.2 RINSE ORAL at 08:05

## 2023-05-28 RX ADMIN — PANTOPRAZOLE SODIUM 40 MG: 40 TABLET, DELAYED RELEASE ORAL at 08:05

## 2023-05-28 RX ADMIN — METOPROLOL TARTRATE 50 MG: 50 TABLET, FILM COATED ORAL at 08:05

## 2023-05-28 RX ADMIN — HYDRALAZINE HYDROCHLORIDE 25 MG: 25 TABLET ORAL at 09:05

## 2023-05-28 RX ADMIN — RANOLAZINE 500 MG: 500 TABLET, EXTENDED RELEASE ORAL at 08:05

## 2023-05-28 RX ADMIN — MORPHINE SULFATE 2 MG: 2 INJECTION, SOLUTION INTRAMUSCULAR; INTRAVENOUS at 06:05

## 2023-05-28 RX ADMIN — CHLORHEXIDINE GLUCONATE 15 ML: 1.2 RINSE ORAL at 09:05

## 2023-05-28 NOTE — PROGRESS NOTES
"Pulmonary/Critical Care  Progress Note      Patient name: Marisol Kerr  MRN: [id number]  Date: 05/28/2023    Admit Date: 5/25/2023  Consult Requested By: Vamshi Young MD    Reason for Consult: AECOPD, respiratory failure    HPI:    5/26/2023 - 76 yo ho COPD (severe, home O2), ASCVD, diastolic heart failure, CKD3, obesity with several admissions this year came to ER with increased SOB, worsened saturations.  Was admitted and placed on BiPAP.  Overnight she has done OK and this AM we tried to take her off BiPAP and after a short while she developed increased SOB, felt "like I'm going to die", she was placed back on BiPAP, got some morphine and was started on precedex due to anxiety.  I came back to revisit her and she indicates that if she worsens she would like to be intubated.  ROS is difficult due to hearing.  CT chest reviewed.  EKG noted has elevated QTc    5/27/2023 - Looks and feels better this AM, off BiPAP and breathing is much more comfortable.  No new complaints.  She does not show much insight into her condition.  Creatinine has increased    5/28/2023 - Stable overnight, no new issues reported, she feels better overall.    Review of Systems    Review of Systems   Constitutional:  Positive for malaise/fatigue. Negative for chills, diaphoresis, fever and weight loss.   HENT:  Positive for hearing loss. Negative for congestion.    Eyes:  Negative for pain.   Respiratory:  Positive for shortness of breath and wheezing. Negative for cough, hemoptysis, sputum production and stridor.    Cardiovascular:  Positive for chest pain ("heavy"). Negative for palpitations, orthopnea, claudication, leg swelling and PND.   Gastrointestinal:  Negative for abdominal pain, blood in stool, constipation, diarrhea, heartburn, nausea and vomiting.   Genitourinary:  Negative for dysuria, frequency, hematuria and urgency.   Musculoskeletal:  Negative for falls and myalgias.   Neurological:  Positive for weakness. Negative for " sensory change and focal weakness.   Psychiatric/Behavioral:  Negative for depression, substance abuse and suicidal ideas. The patient is not nervous/anxious.      Past Medical History    Past Medical History:   Diagnosis Date    CAD (coronary artery disease)     Cancer     colon    CHF (congestive heart failure)     Colitis     Colon cancer     COPD (chronic obstructive pulmonary disease)     Decreased hearing     Diabetes mellitus     Diabetes mellitus, type 2     Hypercholesterolemia     Hypertension     Hypoxemia     Insomnia     Obesity     Osteoarthritis        Past Surgical History    Past Surgical History:   Procedure Laterality Date    ANGIOGRAM, CORONARY, WITH LEFT HEART CATHETERIZATION N/A 2/10/2022    Procedure: Angiogram, Coronary, with Left Heart Cath;  Surgeon: Cristian Benjamin MD;  Location: [facility] CATH/EP LAB;  Service: Cardiology;  Laterality: N/A;    CARDIAC CATHETERIZATION      CHOLECYSTECTOMY      COLECTOMY      CORONARY ANGIOPLASTY      CORONARY STENT PLACEMENT      ERCP N/A 1/16/2023    Procedure: ERCP (ENDOSCOPIC RETROGRADE CHOLANGIOPANCREATOGRAPHY);  Surgeon: Biju Kramer III, MD;  Location: [facility] ENDO;  Service: Endoscopy;  Laterality: N/A;    ESOPHAGOGASTRODUODENOSCOPY N/A 1/29/2023    Procedure: EGD (ESOPHAGOGASTRODUODENOSCOPY);  Surgeon: Aarti Alvarez MD;  Location: [facility];  Service: Endoscopy;  Laterality: N/A;    EXTERNAL EAR SURGERY      EYE SURGERY      FLEXIBLE SIGMOIDOSCOPY N/A 9/19/2019    Procedure: SIGMOIDOSCOPY, FLEXIBLE;  Surgeon: Biju Kramer III, MD;  Location: [facility];  Service: Endoscopy;  Laterality: N/A;    HYSTERECTOMY      partial       Medications (scheduled):      albuterol-ipratropium  3 mL Nebulization Q6H    allopurinoL  50 mg Oral Daily    aspirin  81 mg Oral Daily    atorvastatin  40 mg Oral Daily    balsam peru-castor oiL   Topical (Top) BID    cefTRIAXone (ROCEPHIN) IVPB  1 g Intravenous Q24H    chlorhexidine  15 mL Mouth/Throat BID     "diltiaZEM  120 mg Oral Daily    hydrALAZINE  25 mg Oral Q12H    levoFLOXacin  250 mg Intravenous Q24H    methylPREDNISolone sodium succinate injection  40 mg Intravenous Q8H    metoprolol tartrate  50 mg Oral BID    mupirocin   Nasal BID    pantoprazole  40 mg Intravenous Daily    ranolazine  500 mg Oral BID       Medications (infusions):           Medications (prn):     dextrose 50%, dextrose 50%, glucagon (human recombinant), glucagon (human recombinant), glucose, glucose, hydrALAZINE, lorazepam, midodrine, morphine, naloxone, potassium bicarbonate, potassium bicarbonate, potassium bicarbonate, sodium chloride 0.9%    Family History:   Family History   Problem Relation Age of Onset    Febrile seizures Mother     Heart failure Father        Social History: Tobacco:   Social History     Tobacco Use   Smoking Status Former    Packs/day: 3.00    Years: 50.00    Pack years: 150.00    Types: Cigarettes    Start date: 3/30/1964    Quit date: 2006    Years since quittin.2   Smokeless Tobacco Never   Tobacco Comments    ex smoker 15 years                                EtOH:   Social History     Substance and Sexual Activity   Alcohol Use Yes                                Drugs:   Social History     Substance and Sexual Activity   Drug Use No       Physical Exam    Vital signs:  Temp:  [97.8 °F (36.6 °C)-98 °F (36.7 °C)]   Pulse:  [54-85]   Resp:  [14-41]   BP: (112-194)/(56-84)   SpO2:  [96 %-99 %]     Intake/Output:   Intake/Output Summary (Last 24 hours) at 2023 0744  Last data filed at 2023 0648  Gross per 24 hour   Intake 944.64 ml   Output 700 ml   Net 244.64 ml          BMI: Estimated body mass index is 26.45 kg/m² as calculated from the following:    Height as of this encounter: 5' 3.5" (1.613 m).    Weight as of this encounter: 68.8 kg (151 lb 10.8 oz).    Physical Exam  Vitals and nursing note reviewed.   Constitutional:       General: She is not in acute distress.     Appearance: Normal " appearance. She is obese. She is ill-appearing (chronically). She is not toxic-appearing or diaphoretic.      Comments: Looks better today   HENT:      Head: Normocephalic and atraumatic.      Right Ear: External ear normal.      Left Ear: External ear normal.      Nose: Nose normal. No congestion or rhinorrhea.      Mouth/Throat:      Mouth: Mucous membranes are moist.      Pharynx: Oropharynx is clear. No oropharyngeal exudate or posterior oropharyngeal erythema.   Eyes:      General: No scleral icterus.        Right eye: No discharge.         Left eye: No discharge.      Extraocular Movements: Extraocular movements intact.      Conjunctiva/sclera: Conjunctivae normal.      Pupils: Pupils are equal, round, and reactive to light.   Neck:      Vascular: No carotid bruit.   Cardiovascular:      Rate and Rhythm: Normal rate and regular rhythm.      Pulses: Normal pulses.      Heart sounds: No murmur heard.    No friction rub. No gallop.   Pulmonary:      Effort: Pulmonary effort is normal. No respiratory distress.      Breath sounds: No stridor. No wheezing, rhonchi or rales.      Comments: Decreased BS throughout  + Friedman's sign  Labored at times  Chest:      Chest wall: No tenderness.   Abdominal:      General: Bowel sounds are normal. There is no distension.      Tenderness: There is no abdominal tenderness. There is no guarding.      Comments: Obese    Musculoskeletal:         General: No swelling. Normal range of motion.      Cervical back: Normal range of motion and neck supple. No rigidity or tenderness.      Right lower leg: No edema.      Left lower leg: No edema.   Lymphadenopathy:      Cervical: No cervical adenopathy.   Skin:     General: Skin is warm and dry.      Capillary Refill: Capillary refill takes less than 2 seconds.      Coloration: Skin is not jaundiced.      Findings: No bruising.   Neurological:      General: No focal deficit present.      Mental Status: She is alert and oriented to person,  place, and time. Mental status is at baseline.      Cranial Nerves: No cranial nerve deficit.      Sensory: No sensory deficit.      Motor: No weakness.      Comments: [facility]   Psychiatric:         Mood and Affect: Mood normal.         Behavior: Behavior normal.         Thought Content: Thought content normal.         Judgment: Judgment normal.      Comments: Anxious        Laboratory    Recent Labs   Lab 05/28/23  0406   WBC 8.73   RBC 3.58*   HGB 9.7*   HCT 32.1*      MCV 90   MCH 27.1   MCHC 30.2*         Recent Labs   Lab 05/28/23  0406   CALCIUM 8.3*   PROT 5.5*      K 4.3   CO2 31*      BUN 57*   CREATININE 2.1*   ALKPHOS 77   ALT 27   AST 24   BILITOT 0.6         No results for input(s): PT, INR, APTT in the last 24 hours.      No results for input(s): CPK, CPKMB, TROPONINI, MB in the last 24 hours.    Additional labs: reviewed    Microbiology:       Microbiology Results (last 7 days)       ** No results found for the last 168 hours. **            Radiology    Echo  · The left ventricle is normal in size with concentric remodeling and   normal systolic function.  · The estimated ejection fraction is 65%.  · Indeterminate left ventricular diastolic function.  · Mild to moderate tricuspid regurgitation.  · Mild pulmonic regurgitation.  · Normal right ventricular size with normal right ventricular systolic   function.  · Normal central venous pressure (3 mmHg).  · The estimated PA systolic pressure is 32 mmHg.  · Mild mitral regurgitation.     X-Ray Chest AP Portable  CLINICAL HISTORY:  77 years (1945) Female sob Shortness of breath; Hx of cancer, chf, cad, copd, diabetes, htn    TECHNIQUE:  Portable AP radiograph the chest.    COMPARISON:  Radiograph from May 25, 2023    FINDINGS:  There are unchanged faint linear opacities at the lung bases suggestive of atelectasis/scarring, aspiration/pneumonia or trace edema in the appropriate clinical setting. There is blunting of the costophrenic  angles suggestive of either atelectasis, scarring or trace pleural fluid. There is an ill-defined masslike density posterior to the right hilum highly suspicious for malignancy. The heart is normal in size. Atheromatous calcifications are seen at the aortic arch. Osseous structures show degenerative disc disease and degenerative changes in the shoulders. The visualized upper abdomen is unremarkable.    IMPRESSION:  Unchanged radiograph of the chest when accounting for differences in imaging technique.    Electronically signed by:  García Russell MD  5/26/2023 10:29 AM CDT Workstation: [id number]  US Lower Extremity Veins Right  US LOWER EXTREMITY VEINS LIMITED FOLLOW-UP UNILATERAL    Interpretation time/date:  5/26/2023 6:28 AM CDT    History:RLE edema    Comparison: None    Findings: Routine venous ultrasound with color-flow Doppler was performed of the left lower extremity. Deep venous structures of the calf and thigh are patent without acute or chronic DVT observed. There is normal phasicity without gross reflux. The great saphenous vein appears patent. Incidentally a arterial occlusion of the SFA is suggested with common femoral artery mid vessel stenosis.    Impression: No evidence of left lower extremity DVT. 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    Electronically signed by:  Levar Saab MD  5/26/2023 6:29 AM CDT Workstation: [id number]  CT Chest Without Contrast  EXAM:  CT Chest Without Intravenous Contrast    CLINICAL HISTORY:  The patient is 77 years old and is Female; sob    TECHNIQUE:  Axial computed tomography images of the chest without intravenous contrast.  Sagittal and coronal reformatted images were created and reviewed.  This CT exam was performed using one or more of the following dose reduction techniques:  automated exposure control, adjustment of the mA and/or kV according to patient size, and/or use of iterative reconstruction  technique.    COMPARISON:  No relevant prior studies available.    FINDINGS:  LUNGS:  A spiculated mass within the right lower lobe measuring 3.0 x 2.5 cm is present. The lungs are hyperinflated with mild emphysematous change. Minimal bronchial wall thickening of the lower lobe bronchi is noted with adjacent mild surrounding inflammation. Groundglass opacity within the left upper lobe is present.  PLEURAL SPACE:  Unremarkable.  No pneumothorax.  No significant effusion.  HEART:  No cardiomegaly.  No pericardial effusion.  BONES/JOINTS:  Multilevel degenerative change of the spine is present.  SOFT TISSUES:  The soft tissues are normal.  VASCULATURE:  Atherosclerosis of the aorta is present.  No thoracic aortic aneurysm.  LYMPH NODES:  Unremarkable. No enlarged lymph nodes.  TUBES, LINES AND DEVICES:  A right chest port is present with the tip in the SVC.    IMPRESSION:  1.  Spiculated mass within the right lower lobe. Consider a non-contrast Chest CT at 3 months, a PET/CT, or tissue sampling.    2.  Findings suggest mild bronchitis with developing infiltrate in the left upper lobe.    Electronically signed by:  Erica Ornelas MD  5/26/2023 12:53 AM CDT Workstation: [id number]        Additional Studies    reviewed    Ventilator Information    Oxygen Concentration (%):  [30] 30             Recent Labs     05/26/23  0810   PH 7.388   PCO2 53.2*   PO2 76*   HCO3 32.1*   POCSATURATED 94*   BE 7           Impression    Active Hospital Problems    Diagnosis  POA    *Shortness of breath [R06.02]  Unknown    Acute on chronic respiratory failure with hypoxia and hypercapnia [J96.21, J96.22]  Yes    Prolonged QT interval [R94.31]  Yes    COPD exacerbation [J44.1]  Yes    Hard of hearing [H91.90]  Yes     Chronic    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       Plan    BiPAP as needed and when sleeping and O2 as able  Steroids, respiratory treatments, antibiotics - see orders  Transfer to floor  Prn morphine for air hunger  Cardiology following  Prophylactic lovenox  I am not convinced that there is an acute cardiac issues here  Watch QTc  Watch renal function - I suspect Cr increased due to diuresis  Increase activity as able    Thank you for this consult.  I will follow with you while the patient is hospitalized.        Dereje Saul MD  [facility] Pulmonary/Critical Care

## 2023-05-28 NOTE — CARE UPDATE
05/27/23 1942   Patient Assessment/Suction   Level of Consciousness (AVPU) alert   Respiratory Effort Mild   Expansion/Accessory Muscles/Retractions no use of accessory muscles   All Lung Fields Breath Sounds diminished   Rhythm/Pattern, Respiratory tachypneic   Cough Frequency no cough   Skin Integrity   $ Wound Care Tech Time 15 min   Area Observed Bridge of nose   Skin Appearance redness blanchable   Barrier used? Gel Cushion   PRE-TX-O2   Device (Oxygen Therapy) nasal cannula with humidification   $ Is the patient on Low Flow Oxygen? Yes   Flow (L/min) 2   SpO2 98 %   Pulse Oximetry Type Continuous   $ Pulse Oximetry - Multiple Charge Pulse Oximetry - Multiple   Pulse 82   Resp (!) 30   Aerosol Therapy   $ Aerosol Therapy Charges Aerosol Treatment   Daily Review of Necessity (SVN) completed   Respiratory Treatment Status (SVN) given   Treatment Route (SVN) mask   Patient Position (SVN) semi-Solorzano's   Post Treatment Assessment (SVN) breath sounds unchanged;vital signs unchanged   Signs of Intolerance (SVN) none   Preset CPAP/BiPAP Settings   Mode Of Delivery BiPAP;Standby   Education   $ Education Bronchodilator;15 min   Respiratory Evaluation   $ Care Plan Tech Time 15 min

## 2023-05-28 NOTE — PT/OT/SLP EVAL
Speech Language Pathology Evaluation  Bedside Swallow    Patient Name:  Marisol Kerr   MRN:  [id number]  Admitting Diagnosis: Shortness of breath    Recommendations:                 General Recommendations:  Follow-up not indicated  Diet recommendations:  Regular Diet - IDDSI Level 7, Thin liquids - IDDSI Level 0   Aspiration Precautions: Alternating bites/sips, Avoid talking while eating, HOB to 90 degrees, and Small bites/sips   General Precautions: Standard,    Communication strategies:  hearing aids, speak loudly     Assessment:     Marisol Kerr is a 77 y.o. female with an SLP diagnosis of  swallowing WFL .  She presents with adequate oral phases of the swallow and no overt s/s aspiration across consistencies and textures. Rec regular diet and thin liquids; no further ST warranted.    History:   Pt referred due to choking incident yesterday during meal. Pt does not report hx of swallowing deficits, however.   Past Medical History:   Diagnosis Date    CAD (coronary artery disease)     Cancer     colon    CHF (congestive heart failure)     Colitis     Colon cancer     COPD (chronic obstructive pulmonary disease)     Decreased hearing     Diabetes mellitus     Diabetes mellitus, type 2     Hypercholesterolemia     Hypertension     Hypoxemia     Insomnia     Obesity     Osteoarthritis        Past Surgical History:   Procedure Laterality Date    ANGIOGRAM, CORONARY, WITH LEFT HEART CATHETERIZATION N/A 2/10/2022    Procedure: Angiogram, Coronary, with Left Heart Cath;  Surgeon: Cristian Benjamin MD;  Location: [facility] CATH/EP LAB;  Service: Cardiology;  Laterality: N/A;    CARDIAC CATHETERIZATION      CHOLECYSTECTOMY      COLECTOMY      CORONARY ANGIOPLASTY      CORONARY STENT PLACEMENT      ERCP N/A 1/16/2023    Procedure: ERCP (ENDOSCOPIC RETROGRADE CHOLANGIOPANCREATOGRAPHY);  Surgeon: Biju Kramer III, MD;  Location: [facility] ENDO;  Service: Endoscopy;  Laterality: N/A;    ESOPHAGOGASTRODUODENOSCOPY N/A 1/29/2023     Procedure: EGD (ESOPHAGOGASTRODUODENOSCOPY);  Surgeon: Aarti Alvarez MD;  Location: [facility] ENDO;  Service: Endoscopy;  Laterality: N/A;    EXTERNAL EAR SURGERY      EYE SURGERY      FLEXIBLE SIGMOIDOSCOPY N/A 9/19/2019    Procedure: SIGMOIDOSCOPY, FLEXIBLE;  Surgeon: Biju Kramer III, MD;  Location: [facility] ENDO;  Service: Endoscopy;  Laterality: N/A;    HYSTERECTOMY      partial       Social History: Patient lives in the community.    Prior Intubation HX:  none this admit    Modified Barium Swallow: none found in epic or reported by pt      Chest X-Rays:   Imaging Results              CT Chest Without Contrast (Final result)  Result time 05/26/23 00:53:52      Final result by Erica Ornelas MD (05/26/23 00:53:52)                   Narrative:    EXAM:  CT Chest Without Intravenous Contrast    CLINICAL HISTORY:  The patient is 77 years old and is Female; sob    TECHNIQUE:  Axial computed tomography images of the chest without intravenous contrast.  Sagittal and coronal reformatted images were created and reviewed.  This CT exam was performed using one or more of the following dose reduction techniques:  automated exposure control, adjustment of the mA and/or kV according to patient size, and/or use of iterative reconstruction technique.    COMPARISON:  No relevant prior studies available.    FINDINGS:  LUNGS:  A spiculated mass within the right lower lobe measuring 3.0 x 2.5 cm is present. The lungs are hyperinflated with mild emphysematous change. Minimal bronchial wall thickening of the lower lobe bronchi is noted with adjacent mild surrounding inflammation. Groundglass opacity within the left upper lobe is present.  PLEURAL SPACE:  Unremarkable.  No pneumothorax.  No significant effusion.  HEART:  No cardiomegaly.  No pericardial effusion.  BONES/JOINTS:  Multilevel degenerative change of the spine is present.  SOFT TISSUES:  The soft tissues are normal.  VASCULATURE:  Atherosclerosis of the aorta is  "present.  No thoracic aortic aneurysm.  LYMPH NODES:  Unremarkable. No enlarged lymph nodes.  TUBES, LINES AND DEVICES:  A right chest port is present with the tip in the SVC.    IMPRESSION:  1.  Spiculated mass within the right lower lobe. Consider a non-contrast Chest CT at 3 months, a PET/CT, or tissue sampling.    2.  Findings suggest mild bronchitis with developing infiltrate in the left upper lobe.    Electronically signed by:  Erica Ornelas MD  5/26/2023 12:53 AM CDT Workstation: [id number]                                     X-Ray Chest AP Portable (Final result)  Result time 05/25/23 16:13:51      Final result by Antonieta Fonseca DO (05/25/23 16:13:51)                   Narrative:    PA and lateral chest radiograph: 5/25/2023 4:13 PM CDT    Indication: 77 years  old Female with Chest Pain.    Comparison: 2/2/2023    Findings: The cardiomediastinal silhouette is enlarged.    There are bilateral airspace opacities with interstitial and central vascular prominence.    No pneumothorax is seen.    There is blunting of both costophrenic angles, suggestive of trace effusions.    Impression: There are bilateral airspace opacities with interstitial and central vascular prominence. These findings may reflect evidence of edema.    Electronically signed by:  Antonieta Fonseca DO  5/25/2023 4:13 PM CDT Workstation: [id number]                                      Prior diet: regular, thin at home and prior to choking incident yesterday; puree since choking this admit.    Occupation/hobbies/homemaking: none stated.    Subjective     Pt awake in bed watching TV. Agreeable to ST eval.  Patient goals: "It was an accident when I choked the other day; I can swallow fine"     Pain/Comfort:       Respiratory Status: Nasal cannula, flow 2 L/min    Objective:   Pt alert and oriented x4. She reports she coughs occasionally, w/ or w/o presence of food or liquids. No other hx of swallowing impairment reported by pt. In depth ed re need " for dentures to be safely adhered in order to effectively and safely assist in mastication of complex textures.     Oral Musculature Evaluation  Oral Musculature: WFL  Dentition: edentulous, uses dentures to eat, upper and lower dentures  Secretion Management: adequate  Mucosal Quality: adequate  Mandibular Strength and Mobility: WFL  Oral Labial Strength and Mobility: impaired coordination, functional retraction, functional pursing, functional seal  Lingual Strength and Mobility: WFL  Velar Elevation: WFL  Buccal Strength and Mobility: WFL  Volitional Cough: elicited; adequate  Volitional Swallow: palpated; adequate laryngeal elevation/excursion  Voice Prior to PO Intake: clear    Bedside Swallow Eval:   Consistencies Assessed:  Thin liquids water via cup edge and straw  Puree tsp bites pudding  Mixed consistencies tsp bites diced peaches in thin juice  Solids dry cracker      Oral Phase:   Loose dentures impacting mastication  Prolonged mastication w/ cracker due to loose dentures    Pharyngeal Phase:   no overt clinical signs/symptoms of aspiration  no overt clinical signs/symptoms of pharyngeal dysphagia    Compensatory Strategies  Liquid wash to clear mild residue from cracker- successful    Treatment: Pt educated re purpose of CSE, role of SLP, results of CSE, denture care, diet recs, and swallowing precautions. Pt expressed understanding of recs.      Goals:   Multidisciplinary Problems       SLP Goals       Not on file                    Plan:     Patient to be seen:      Plan of Care expires:     Plan of Care reviewed with:  patient, other (see comments) (nursing, MD)   SLP Follow-Up:  No       Discharge recommendations:  home   Barriers to Discharge:  None    Time Tracking:     SLP Treatment Date:   05/28/23  Speech Start Time:  0945  Speech Stop Time:  1002     Speech Total Time (min):  17 min    Billable Minutes: Eval Swallow and Oral Function 17 min    05/28/2023

## 2023-05-28 NOTE — NURSING
Nurses Note -- 4 Eyes      5/28/2023   5:32 PM      Skin assessed during: Transfer      [] No Altered Skin Integrity Present    [x]Prevention Measures Documented      [x] Yes- Altered Skin Integrity Present or Discovered   [x] LDA Added if Not in Epic (Describe Wound)   [] New Altered Skin Integrity was Present on Admit and Documented in LDA   [] Wound Image Taken    Wound Care Consulted? Yes    Attending Nurse:  Chanell Frazier RN     Second RN/Staff Member:  Farhan Robb

## 2023-05-28 NOTE — PROGRESS NOTES
[facility]  Department of Cardiology  Progress Note      PATIENT NAME: Marisol Kerr    MRN: [id number]  TODAY'S DATE: 05/28/2023  ADMIT DATE: 5/25/2023                          CONSULT REQUESTED BY: Vamshi Young MD    SUBJECTIVE     PRINCIPAL PROBLEM: Shortness of breath    INTERVAL HISTORY  5/28/23  Resting in bed; denies chest pain; no C/O      5/27/23:  Pt reportedly feeling much better today. Bp trend normal. Heparin infusing. NSR 80s  Denies chest pain; reportedly breathing more comfortably today  Creatinine 2.2 today      REASON FOR CONSULT:    Shortness of breath, chest pain, elevated troponin      HPI:    Patient is a 77-year-old female who is known to our practice and has had multiple recent hospitalizations related to breathing issues and chest pain.  Patient reports a sudden onset of chest discomfort and shortness of breath with O2 sats in the 70s per EMS.  Patient was brought into the emergency room and placed in the ICU.  She had a mild nondiagnostic troponin elevation which is now trending down.  Patient denies chest discomfort except when she begins to have breathing issues.  She is a known history of severe COPD with poor lung function, chronic diastolic heart failure, chronic hypoxemic respiratory failure on home O2, obesity, CKD stage 3, anemia, and multivessel CAD of which she has not recently received a stent per my chart review.        Review of patient's allergies indicates:   Allergen Reactions    Iodinated contrast media     Strawberries [strawberry] Hives and Itching       Past Medical History:   Diagnosis Date    CAD (coronary artery disease)     Cancer     colon    CHF (congestive heart failure)     Colitis     Colon cancer     COPD (chronic obstructive pulmonary disease)     Decreased hearing     Diabetes mellitus     Diabetes mellitus, type 2     Hypercholesterolemia     Hypertension     Hypoxemia     Insomnia     Obesity     Osteoarthritis      Past Surgical History:    Procedure Laterality Date    ANGIOGRAM, CORONARY, WITH LEFT HEART CATHETERIZATION N/A 2/10/2022    Procedure: Angiogram, Coronary, with Left Heart Cath;  Surgeon: Cristian Benjamin MD;  Location: [facility] CATH/EP LAB;  Service: Cardiology;  Laterality: N/A;    CARDIAC CATHETERIZATION      CHOLECYSTECTOMY      COLECTOMY      CORONARY ANGIOPLASTY      CORONARY STENT PLACEMENT      ERCP N/A 2023    Procedure: ERCP (ENDOSCOPIC RETROGRADE CHOLANGIOPANCREATOGRAPHY);  Surgeon: Biju Kramer III, MD;  Location: [facility] ENDO;  Service: Endoscopy;  Laterality: N/A;    ESOPHAGOGASTRODUODENOSCOPY N/A 2023    Procedure: EGD (ESOPHAGOGASTRODUODENOSCOPY);  Surgeon: Aarti Alvarez MD;  Location: [facility] ENDO;  Service: Endoscopy;  Laterality: N/A;    EXTERNAL EAR SURGERY      EYE SURGERY      FLEXIBLE SIGMOIDOSCOPY N/A 2019    Procedure: SIGMOIDOSCOPY, FLEXIBLE;  Surgeon: Biju Kramer III, MD;  Location: [facility];  Service: Endoscopy;  Laterality: N/A;    HYSTERECTOMY      partial     Social History     Tobacco Use    Smoking status: Former     Packs/day: 3.00     Years: 50.00     Pack years: 150.00     Types: Cigarettes     Start date: 3/30/1964     Quit date: 2006     Years since quittin.2    Smokeless tobacco: Never    Tobacco comments:     ex smoker 15 years   Substance Use Topics    Alcohol use: Yes    Drug use: No        REVIEW OF SYSTEMS  Per HPI    OBJECTIVE     VITAL SIGNS (Most Recent)  Temp: 98.4 °F (36.9 °C) (23)  Pulse: 85 (23)  Resp: 19 (23)  BP: (!) 178/74 (nurse mendel notified) (23)  SpO2: 95 % (23)    VENTILATION STATUS  Resp: 19 (23)  SpO2: 95 % (23)  Oxygen Concentration (%):  [30] 30        I & O (Last 24H):  Intake/Output Summary (Last 24 hours) at 2023 1740  Last data filed at 2023 1528  Gross per 24 hour   Intake 684.64 ml   Output 300 ml   Net 384.64 ml         WEIGHTS  Wt Readings from  Last 1 Encounters:   05/26/23 0115 68.8 kg (151 lb 10.8 oz)   05/25/23 1649 77.1 kg (170 lb)       PHYSICAL EXAM  CONSTITUTIONAL: No fever, no chills  HEENT: Normocephalic, atraumatic,pupils reactive to light; very hard of hearing                  NECK:  No JVD no carotid bruit  CVS: S1S2+, RRR  LUNGS: +Rhonchi, +harsh wet cough, 2 LPM O2  ABDOMEN: Soft, NT, BS+  EXTREMITIES: No cyanosis, edema  : No daniel catheter  NEURO: AAO X 3  PSY: Normal affect      HOME MEDICATIONS:  No current facility-administered medications on file prior to encounter.     Current Outpatient Medications on File Prior to Encounter   Medication Sig Dispense Refill    albuterol (VENTOLIN HFA) 90 mcg/actuation inhaler Inhale 2 puffs into the lungs every 6 (six) hours as needed for Wheezing or Shortness of Breath. Rescue 36 g 3    albuterol-ipratropium (DUO-NEB) 2.5 mg-0.5 mg/3 mL nebulizer solution Take 3 mLs by nebulization every 4 (four) hours as needed for Wheezing or Shortness of Breath. Rescue 120 each 6    allopurinoL (ZYLOPRIM) 100 MG tablet Take 0.5 tablets (50 mg total) by mouth once daily. 45 tablet 1    aspirin (ECOTRIN) 81 MG EC tablet Take 1 tablet (81 mg total) by mouth every morning. 90 tablet 3    atorvastatin (LIPITOR) 40 MG tablet Take 1 tablet (40 mg total) by mouth once daily. 90 tablet 3    cholecalciferol, vitamin D3, 125 mcg (5,000 unit) Tab Take 5,000 Units by mouth once daily.      cholestyramine (QUESTRAN) 4 gram packet Take 1 packet (4 g total) by mouth 2 (two) times daily. 180 packet 3    coenzyme Q10 100 mg capsule Take 100 mg by mouth every morning.      diltiaZEM (CARDIZEM CD) 120 MG Cp24 Take 1 capsule (120 mg total) by mouth once daily. 90 capsule 1    ergocalciferol (VITAMIN D2) 50,000 unit Cap Take 1 capsule (50,000 Units total) by mouth every 7 days. 12 capsule 1    ferrous sulfate 325 (65 FE) MG EC tablet Take 325 mg by mouth once daily.      fish oil-omega-3 fatty acids 300-1,000 mg capsule Take 2  capsules by mouth every morning.      ipratropium-albuteroL (COMBIVENT RESPIMAT)  mcg/actuation inhaler Inhale 2 puffs into the lungs every 4 (four) hours as needed for Shortness of Breath. Rescue 12 g 3    isosorbide mononitrate (IMDUR) 120 MG 24 hr tablet Take 1 tablet (120 mg total) by mouth once daily. 90 tablet 1    ondansetron (ZOFRAN-ODT) 4 MG TbDL Take 1 tablet (4 mg total) by mouth every 6 (six) hours as needed (nausea). 30 tablet 0    pantoprazole (PROTONIX) 40 MG tablet Take 1 tablet (40 mg total) by mouth once daily. 90 tablet 1    polycarbophil (FIBERCON) 625 mg tablet Take 625 mg by mouth once daily.      ranolazine (RANEXA) 500 MG Tb12 Take 1 tablet (500 mg total) by mouth 2 (two) times daily. 180 tablet 1    ticagrelor (BRILINTA) 90 mg tablet Take 1 tablet (90 mg total) by mouth every 12 (twelve) hours. 60 tablet 5    umeclidinium (INCRUSE ELLIPTA) 62.5 mcg/actuation inhalation capsule Inhale 62.5 mcg into the lungs every morning. Controller 30 each 11    vit C/E/Zn/coppr/lutein/zeaxan (PRESERVISION AREDS-2 ORAL) Take 1 tablet by mouth once daily.      furosemide (LASIX) 40 MG tablet Take 1 tablet (40 mg total) by mouth once daily. 30 tablet 0    hydrALAZINE (APRESOLINE) 50 MG tablet Take 1 tablet (50 mg total) by mouth every 12 (twelve) hours. 60 tablet 0    metoprolol tartrate (LOPRESSOR) 50 MG tablet Take 1 tablet (50 mg total) by mouth 2 (two) times daily. 180 tablet 3    mupirocin (BACTROBAN) 2 % ointment Apply topically 2 (two) times daily. 30 g 3    nebulizer and compressor Reshma as directed 1 each 0    nitroGLYCERIN 0.4 MG/DOSE TL SPRY (NITROLINGUAL) 400 mcg/spray spray Place 1 spray under the tongue every 5 (five) minutes as needed for Chest pain (max of 3 doses). 4.9 g 1    [DISCONTINUED] benazepriL (LOTENSIN) 40 MG tablet Take 1 tablet (40 mg total) by mouth once daily. 90 tablet 1    [DISCONTINUED] cimetidine (TAGAMET) 300 MG tablet Take 1 tablet (300 mg total) by mouth every 6 (six)  hours. Take 1 tablet (300 mg total) by mouth starting at 6 PM on Sunday April 17, 2022 (the evening before your angiogram procedure). Then take 1 tablet at 12 AM, then take 1 tablet at 6 AM on Monday April 18th. 3 tablet 0    [DISCONTINUED] lisinopriL 10 MG tablet Take 1 tablet (10 mg total) by mouth once daily. HOLD UNTIL OTHERWISE DIRECTED BY PRIMARY CARE PROVIDER 90 tablet 3    [DISCONTINUED] lovastatin (MEVACOR) 40 MG tablet Take 1 tablet (40 mg total) by mouth every other day. 45 tablet 3       SCHEDULED MEDS:   albuterol-ipratropium  3 mL Nebulization Q6H    allopurinoL  50 mg Oral Daily    aspirin  81 mg Oral Daily    atorvastatin  40 mg Oral Daily    balsam peru-castor oiL   Topical (Top) BID    cefUROXime  250 mg Oral Q12H    chlorhexidine  15 mL Mouth/Throat BID    diltiaZEM  120 mg Oral Daily    heparin (porcine)  5,000 Units Subcutaneous Q8H    hydrALAZINE  25 mg Oral Q12H    metoprolol tartrate  50 mg Oral BID    mupirocin   Nasal BID    pantoprazole  40 mg Oral Daily    predniSONE  30 mg Oral Daily    ranolazine  500 mg Oral BID    ticagrelor  90 mg Oral Q12H       CONTINUOUS INFUSIONS:        PRN MEDS:dextrose 50%, dextrose 50%, glucagon (human recombinant), glucagon (human recombinant), glucose, glucose, hydrALAZINE, lorazepam, midodrine, morphine, naloxone, potassium bicarbonate, potassium bicarbonate, potassium bicarbonate, sodium chloride 0.9%    LABS AND DIAGNOSTICS     CBC LAST 3 DAYS  Recent Labs   Lab 05/25/23  1603 05/26/23  0506 05/27/23  0447 05/28/23  0406   WBC 11.78 6.43 8.29 8.73   RBC 4.10 3.87* 3.68* 3.58*   HGB 11.0* 10.5* 9.8* 9.7*   HCT 37.1 34.4* 33.0* 32.1*   MCV 91 89 90 90   MCH 26.8* 27.1 26.6* 27.1   MCHC 29.6* 30.5* 29.7* 30.2*   RDW 17.1* 16.9* 16.9* 16.9*    164 191 181   MPV 12.5 12.4 12.7 12.4   GRAN 77.0*  9.1* 80.0* 88.4*  7.3 90.4*  7.9*   LYMPH 17.0*  2.0 8.0* 6.2*  0.5* 6.6*  0.6*   MONO 5.0  0.6 0.0* 4.7  0.4 2.5*  0.2*   BASO 0.03  --  0.01 0.00    NRBC 0 0 0 0         COAGULATION LAST 3 DAYS  Recent Labs   Lab 05/25/23  1603 05/26/23  1016 05/26/23  1848 05/27/23  0447   INR 0.9  --   --   --    APTT 24.8 25.6 55.6* 46.2*         CHEMISTRY LAST 3 DAYS  Recent Labs   Lab 05/25/23  1603 05/25/23  1629 05/25/23  1916 05/26/23  0506 05/26/23  0810 05/27/23  0447 05/28/23  0406     --   --  137  --  140 139   K 4.8  --   --  4.5  --  4.1 4.3   CL 98  --   --  96  --  99 100   CO2 29  --   --  29  --  29 31*   ANIONGAP 9  --   --  12  --  12 8   BUN 25*  --   --  29*  --  45* 57*   CREATININE 1.8*  --   --  1.9*  --  2.2* 2.1*   *  --   --  159*  --  168* 166*   CALCIUM 8.4*  --   --  8.5*  --  8.4* 8.3*   PH  --  7.287* 7.396  --  7.388  --   --    MG 1.8  --   --  1.9  --   --   --    ALBUMIN 3.1*  --   --  2.9*  --  2.7* 2.6*   PROT 6.8  --   --  6.4  --  5.7* 5.5*   ALKPHOS 97  --   --  83  --  77 77   ALT 16  --   --  14  --  32 27   AST 23  --   --  20  --  45* 24   BILITOT 0.6  --   --  0.9  --  0.9 0.6         CARDIAC PROFILE LAST 3 DAYS  Recent Labs   Lab 05/25/23  1603 05/25/23  1813 05/25/23  2324 05/26/23  0506   *  --   --   --    TROPONINIHS 14.9 159.7* 623.7* 546.8*         ENDOCRINE LAST 3 DAYS  Recent Labs   Lab 05/26/23  1245   PROCAL 0.88*         LAST ARTERIAL BLOOD GAS  ABG  Recent Labs   Lab 05/26/23  0810   PH 7.388   PO2 76*   PCO2 53.2*   HCO3 32.1*   BE 7         LAST 7 DAYS MICROBIOLOGY   Microbiology Results (last 7 days)       ** No results found for the last 168 hours. **            MOST RECENT IMAGING  Echo  · The left ventricle is normal in size with concentric remodeling and   normal systolic function.  · The estimated ejection fraction is 65%.  · Indeterminate left ventricular diastolic function.  · Mild to moderate tricuspid regurgitation.  · Mild pulmonic regurgitation.  · Normal right ventricular size with normal right ventricular systolic   function.  · Normal central venous pressure (3 mmHg).  · The  estimated PA systolic pressure is 32 mmHg.  · Mild mitral regurgitation.     X-Ray Chest AP Portable  CLINICAL HISTORY:  77 years (1945) Female sob Shortness of breath; Hx of cancer, chf, cad, copd, diabetes, htn    TECHNIQUE:  Portable AP radiograph the chest.    COMPARISON:  Radiograph from May 25, 2023    FINDINGS:  There are unchanged faint linear opacities at the lung bases suggestive of atelectasis/scarring, aspiration/pneumonia or trace edema in the appropriate clinical setting. There is blunting of the costophrenic angles suggestive of either atelectasis, scarring or trace pleural fluid. There is an ill-defined masslike density posterior to the right hilum highly suspicious for malignancy. The heart is normal in size. Atheromatous calcifications are seen at the aortic arch. Osseous structures show degenerative disc disease and degenerative changes in the shoulders. The visualized upper abdomen is unremarkable.    IMPRESSION:  Unchanged radiograph of the chest when accounting for differences in imaging technique.    Electronically signed by:  García Russell MD  5/26/2023 10:29 AM CDT Workstation: [id number]  US Lower Extremity Veins Right  US LOWER EXTREMITY VEINS LIMITED FOLLOW-UP UNILATERAL    Interpretation time/date:  5/26/2023 6:28 AM CDT    History:RLE edema    Comparison: None    Findings: Routine venous ultrasound with color-flow Doppler was performed of the left lower extremity. Deep venous structures of the calf and thigh are patent without acute or chronic DVT observed. There is normal phasicity without gross reflux. The great saphenous vein appears patent. Incidentally a arterial occlusion of the SFA is suggested with common femoral artery mid vessel stenosis.    Impression: No evidence of left lower extremity DVT. 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    Electronically signed by:  Levar Saab MD  5/26/2023 6:29 AM CDT Workstation:  [id number]  CT Chest Without Contrast  EXAM:  CT Chest Without Intravenous Contrast    CLINICAL HISTORY:  The patient is 77 years old and is Female; sob    TECHNIQUE:  Axial computed tomography images of the chest without intravenous contrast.  Sagittal and coronal reformatted images were created and reviewed.  This CT exam was performed using one or more of the following dose reduction techniques:  automated exposure control, adjustment of the mA and/or kV according to patient size, and/or use of iterative reconstruction technique.    COMPARISON:  No relevant prior studies available.    FINDINGS:  LUNGS:  A spiculated mass within the right lower lobe measuring 3.0 x 2.5 cm is present. The lungs are hyperinflated with mild emphysematous change. Minimal bronchial wall thickening of the lower lobe bronchi is noted with adjacent mild surrounding inflammation. Groundglass opacity within the left upper lobe is present.  PLEURAL SPACE:  Unremarkable.  No pneumothorax.  No significant effusion.  HEART:  No cardiomegaly.  No pericardial effusion.  BONES/JOINTS:  Multilevel degenerative change of the spine is present.  SOFT TISSUES:  The soft tissues are normal.  VASCULATURE:  Atherosclerosis of the aorta is present.  No thoracic aortic aneurysm.  LYMPH NODES:  Unremarkable. No enlarged lymph nodes.  TUBES, LINES AND DEVICES:  A right chest port is present with the tip in the SVC.    IMPRESSION:  1.  Spiculated mass within the right lower lobe. Consider a non-contrast Chest CT at 3 months, a PET/CT, or tissue sampling.    2.  Findings suggest mild bronchitis with developing infiltrate in the left upper lobe.    Electronically signed by:  Erica Ornelas MD  5/26/2023 12:53 AM CDT Workstation: [id number]      ECHOCARDIOGRAM RESULTS (last 5)  Results for orders placed during the hospital encounter of 01/15/23    Echo    Interpretation Summary  · The left ventricle is normal in size with moderate concentric hypertrophy and  normal systolic function.  · Sinus tachycardia heart rate 110  · The estimated ejection fraction is 70%.  · Indeterminate left ventricular diastolic function with normal left atrial pressure.  · Atrial fibrillation not observed.  · Normal right ventricular size with normal right ventricular systolic function.  · Mild mitral regurgitation.  · Normal central venous pressure (3 mmHg).      Results for orders placed during the hospital encounter of 04/08/22    Echo    Interpretation Summary  · Limited study for wall morgan. several off axis views.  · The estimated ejection fraction is 60%.  · The left ventricle is normal in size with normal systolic function.  · The following segments are hypokinetic: basal inferoseptal and basal inferior. All other segments are normal.  · Normal right ventricular size with normal right ventricular systolic function.      Results for orders placed during the hospital encounter of 02/11/22    Echo    Interpretation Summary  · The left ventricle is normal in size with normal systolic function.  · The estimated ejection fraction is 65%.  · There is a minor septal wall motion abnormality.  · Severe left atrial enlargement.  · Grade II left ventricular diastolic dysfunction.  · Normal right ventricular size with normal right ventricular systolic function.  · Normal central venous pressure (3 mmHg).      Results for orders placed during the hospital encounter of 02/10/22    Echo    Interpretation Summary  · The estimated PA systolic pressure is 37 mmHg.  · The left ventricle is normal in size with normal systolic function.  · The estimated ejection fraction is 60%.  · Grade II left ventricular diastolic dysfunction.  · Normal right ventricular size with normal right ventricular systolic function.  · Severe left atrial enlargement.  · Moderate right atrial enlargement.  · Mild mitral regurgitation.  · Mild tricuspid regurgitation.  · Normal central venous pressure (3 mmHg).      Results for  orders placed during the hospital encounter of 09/16/19    Echo Color Flow Doppler? Yes    Interpretation Summary  · Concentric left ventricular hypertrophy.  · Normal left ventricular systolic function. The estimated ejection fraction is 65%  · Normal LV diastolic function.  · Normal right ventricular systolic function.  · Calculated RVSP is 28mmhg.      CURRENT/PREVIOUS VISIT EKG  Results for orders placed or performed during the hospital encounter of 05/25/23   EKG 12-lead    Collection Time: 05/28/23 12:24 PM    Narrative    Test Reason : R94.31,    Vent. Rate : 075 BPM     Atrial Rate : 075 BPM     P-R Int : 170 ms          QRS Dur : 098 ms      QT Int : 434 ms       P-R-T Axes : 075 046 024 degrees     QTc Int : 484 ms    Normal sinus rhythm  Normal ECG  When compared with ECG of 26-MAY-2023 09:33,  Criteria for Septal infarct are no longer Present  ST no longer depressed in Inferior leads  Nonspecific T wave abnormality no longer evident in Lateral leads    Referred By: [email or web address]   SELF           Confirmed By:            ASSESSMENT/PLAN:     Active Hospital Problems    Diagnosis    *Shortness of breath    Acute on chronic respiratory failure with hypoxia and hypercapnia    Prolonged QT interval    COPD exacerbation    Hard of hearing    COPD/emphysema    Chronic kidney disease, stage 4 (severe)    DM type 2, controlled, with complication    Essential hypertension, benign       ASSESSMENT & PLAN:     Acute on chronic hypoxemic respiratory failure  troponin elevation likely due to demand ischemia  Multivessel CAD  Severe COPD  HFpEF  HTN  Anxiety  8. ISSA    RECOMMENDATIONS:    HFpEF: Continue metoprolol, hydralazine.Strict I/O , daily weight, low sodium diet.   ISSA: creatinine 2.1 today. Diuretics on hold  CAD: Patient denies chest pain; Continue medication therapy: plavix 75 mg daily aspirin 81 mg daily, Ranexa 500 mg BID and atorvastatin 40 mg daily. Troponin elevation attributed to demand ischemia. Follow up  outpatient with Dr. Chappell for multi vessel disease  HTN: Blood pressure trend is elevated. Would increase hydralazine to 50 mg BID, and continue diltiazem 120 mg daily, metoprolol 50 mg BID. BP was well controlled yesterday with hydralazine at 50 mg BID     Cardiology to sign off. Please re consult as needed      Arlin Murray NP  Date of Service: 05/28/2023  10:22 AM     I have personally interviewed and examined the patient, I have reviewed the Nurse Practitioner's history and physical, assessment, and plan. I have personally evaluated the patient at bedside and agree with the findings and made appropriate changes as necessary in recommendations.    Edwige León MD  Department of Cardiology  [facility]  5/28/23

## 2023-05-28 NOTE — PROGRESS NOTES
[facility] Medicine  Progress Note    Patient Name: Marisol Kerr  MRN: [id number]  Patient Class: IP- Inpatient   Admission Date: 5/25/2023  Length of Stay: 3 days  Attending Physician: Vamshi Young MD  Primary Care Provider: Khadar Dwyer MD        Subjective:     Principal Problem:Shortness of breath    Interval History:  Patient is improved spirits today.  Blood pressure and work of breathing are much improved.  She is on 2 L of oxygen.  EKG is improved.  Speech therapy recommended regular diet.      Review of Systems  Objective:     Vital Signs (Most Recent):  Temp: 98 °F (36.7 °C) (05/28/23 1134)  Pulse: 73 (05/28/23 1134)  Resp: 19 (05/28/23 1134)  BP: (!) 151/82 (nurse mendel notified) (05/28/23 1134)  SpO2: 96 % (05/28/23 1134) Vital Signs (24h Range):  Temp:  [97.8 °F (36.6 °C)-98.7 °F (37.1 °C)] 98 °F (36.7 °C)  Pulse:  [54-85] 73  Resp:  [14-44] 19  SpO2:  [93 %-99 %] 96 %  BP: (112-156)/(56-82) 151/82     Weight: 68.8 kg (151 lb 10.8 oz)  Body mass index is 26.45 kg/m².    Intake/Output Summary (Last 24 hours) at 5/28/2023 1327  Last data filed at 5/28/2023 0648  Gross per 24 hour   Intake 704.64 ml   Output 700 ml   Net 4.64 ml        Physical Exam  Physical Exam     Nursing note and vitals reviewed.  Constitutional: She is not diaphoretic.  Confused and difficult to arouse.  On BiPAP.  HENT:   Head: Normocephalic and atraumatic.   Eyes: Conjunctivae are normal.   Neck:   Normal range of motion.  Cardiovascular:  Normal rate.           Pulmonary/Chest:   Poor air movement with diffuse inspiratory and expiratory rhonchi.  Positive JVD.   Abdominal: Abdomen is soft. There is no abdominal tenderness.   Musculoskeletal:         General: Normal range of motion.      Cervical back: Normal range of motion.      Neurological: She is alert. No sensory deficit.   Moves all extremities equally   Skin: No rash noted.   Psychiatric:   On initial examination patient has difficulty  answer questions due to shortness of breath and respiratory distress.         Significant Labs: All pertinent labs within the past 24 hours have been reviewed.    Significant Imaging: I have reviewed all pertinent imaging results/findings within the past 24 hours.    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] 01/16/2019 Yes     Chronic    Chronic kidney disease, stage 4 (severe) [N18.4] 10/08/2014 Yes    DM type 2, controlled, with complication [E11.8] 06/03/2013 Yes    Essential hypertension, benign [I10] 04/09/2013 Yes      Problems Resolved During this Admission:     VTE Risk Mitigation (From admission, onward)           Ordered     enoxaparin injection 30 mg  Every 24 hours         05/28/23 0749     IP VTE HIGH RISK PATIENT  Once         05/25/23 1814     Place sequential compression device  Until discontinued         05/25/23 1814                                      Acute hypoxemic and hypercapnic respiratory failure, likely secondary to CHF, very severe COPD, NSTEMI  -okay for GMF with remote tele  -continue BiPAP at night  -hold Lasix for now, patient has diuresed well, some hypotension  - Pulmonary consult appreciated  -NTG drip, heparin drip x 48 hours  -empiric antibiotics for possible pneumonia    Prolonged QTC, improved  -monitor on tele, avoid QTC prolonging agents       History of multivessel CAD with stent  Abnormal EKG with ST depressions  -She had left heart catheterization 2/2022 severe triple-vessel disease and had complication with rectus sheet hematoma/bleeding [place] [facility] [place].  She was  deemed too high risk for CABG.  She states that she was told she was too high risk for PCI, they are not planning on doing an angiogram for high risk PCI, and she will be medically managed  - finished 48 hours heparin gtt  -c/w asa/brilinta       Metabolic encephalopathy, improving    CKD 3-monitor ins and outs, avoid nephrotoxic agents     [id number]      HSQ prophylaxis, ppi    Restart home medications as ordered  Consider palliative care    Vamshi Young MD  Department of Hospital Medicine   [facility]

## 2023-05-28 NOTE — CARE UPDATE
05/28/23 0806   Patient Assessment/Suction   Level of Consciousness (AVPU) alert   Respiratory Effort Normal;Unlabored   Expansion/Accessory Muscles/Retractions no use of accessory muscles;no retractions;expansion symmetric   All Lung Fields Breath Sounds Anterior:;Lateral:;diminished   Rhythm/Pattern, Respiratory unlabored;pattern regular;depth regular   Skin Integrity   $ Wound Care Tech Time 15 min   Area Observed Left;Right;Behind ear;Cheek;Bridge of nose   Skin Appearance redness blanchable   PRE-TX-O2   Device (Oxygen Therapy) nasal cannula with humidification   $ Is the patient on Low Flow Oxygen? Yes   Flow (L/min) 2   SpO2 99 %   Pulse Oximetry Type Continuous   $ Pulse Oximetry - Multiple Charge Pulse Oximetry - Multiple   Pulse 73   Resp (!) 24   Aerosol Therapy   $ Aerosol Therapy Charges Aerosol Treatment   Daily Review of Necessity (SVN) completed   Respiratory Treatment Status (SVN) given   Treatment Route (SVN) in-line   Patient Position (SVN) HOB elevated   Post Treatment Assessment (SVN) breath sounds unchanged   Signs of Intolerance (SVN) none   Preset CPAP/BiPAP Settings   Mode Of Delivery BiPAP;Standby   $ CPAP/BiPAP Daily Charge BiPAP/CPAP Daily   Education   $ Education Bronchodilator;BiPAP;30 min   Respiratory Evaluation   $ Care Plan Tech Time 15 min   $ Eval/Re-eval Charges Re-evaluation   Evaluation For New Orders

## 2023-05-28 NOTE — NURSING
Pt transferred to 1112 in bed on 2L O2, staff notified of patients arrival on unit. Pt belongings sent with pt. Call light within reach, bed alarm on. Report given to OFELIA Hsu.

## 2023-05-29 ENCOUNTER — PATIENT OUTREACH (OUTPATIENT)
Dept: HOME HEALTH SERVICES | Facility: HOSPITAL | Age: 78
End: 2023-05-29

## 2023-05-29 ENCOUNTER — EXTERNAL HOME HEALTH (OUTPATIENT)
Dept: HOME HEALTH SERVICES | Facility: HOSPITAL | Age: 78
End: 2023-05-29
Payer: MEDICARE

## 2023-05-29 PROBLEM — Z71.89 GOALS OF CARE, COUNSELING/DISCUSSION: Status: ACTIVE | Noted: 2023-05-29

## 2023-05-29 LAB
ALBUMIN SERPL BCP-MCNC: 2.7 G/DL (ref 3.5–5.2)
ALP SERPL-CCNC: 70 U/L (ref 55–135)
ALT SERPL W/O P-5'-P-CCNC: 24 U/L (ref 10–44)
ANION GAP SERPL CALC-SCNC: 6 MMOL/L (ref 8–16)
ANISOCYTOSIS BLD QL SMEAR: SLIGHT
AST SERPL-CCNC: 34 U/L (ref 10–40)
BASOPHILS # BLD AUTO: 0.01 K/UL (ref 0–0.2)
BASOPHILS NFR BLD: 0.1 % (ref 0–1.9)
BILIRUB SERPL-MCNC: 0.6 MG/DL (ref 0.1–1)
BUN SERPL-MCNC: 57 MG/DL (ref 8–23)
CALCIUM SERPL-MCNC: 8.7 MG/DL (ref 8.7–10.5)
CHLORIDE SERPL-SCNC: 103 MMOL/L (ref 95–110)
CO2 SERPL-SCNC: 33 MMOL/L (ref 23–29)
CREAT SERPL-MCNC: 1.8 MG/DL (ref 0.5–1.4)
DIFFERENTIAL METHOD: ABNORMAL
EOSINOPHIL # BLD AUTO: 0 K/UL (ref 0–0.5)
EOSINOPHIL NFR BLD: 0 % (ref 0–8)
ERYTHROCYTE [DISTWIDTH] IN BLOOD BY AUTOMATED COUNT: 16.8 % (ref 11.5–14.5)
EST. GFR  (NO RACE VARIABLE): 29 ML/MIN/1.73 M^2
GLUCOSE SERPL-MCNC: 106 MG/DL (ref 70–110)
GLUCOSE SERPL-MCNC: 111 MG/DL (ref 70–110)
GLUCOSE SERPL-MCNC: 112 MG/DL (ref 70–110)
GLUCOSE SERPL-MCNC: 121 MG/DL (ref 70–110)
GLUCOSE SERPL-MCNC: 248 MG/DL (ref 70–110)
HCT VFR BLD AUTO: 34.5 % (ref 37–48.5)
HGB BLD-MCNC: 10.3 G/DL (ref 12–16)
HYPOCHROMIA BLD QL SMEAR: ABNORMAL
IMM GRANULOCYTES # BLD AUTO: 0.05 K/UL (ref 0–0.04)
IMM GRANULOCYTES NFR BLD AUTO: 0.5 % (ref 0–0.5)
LYMPHOCYTES # BLD AUTO: 1 K/UL (ref 1–4.8)
LYMPHOCYTES NFR BLD: 10.6 % (ref 18–48)
MCH RBC QN AUTO: 27.1 PG (ref 27–31)
MCHC RBC AUTO-ENTMCNC: 29.9 G/DL (ref 32–36)
MCV RBC AUTO: 91 FL (ref 82–98)
MONOCYTES # BLD AUTO: 0.7 K/UL (ref 0.3–1)
MONOCYTES NFR BLD: 7.5 % (ref 4–15)
NEUTROPHILS # BLD AUTO: 7.6 K/UL (ref 1.8–7.7)
NEUTROPHILS NFR BLD: 81.3 % (ref 38–73)
NRBC BLD-RTO: 0 /100 WBC
PLATELET # BLD AUTO: 185 K/UL (ref 150–450)
PLATELET BLD QL SMEAR: ABNORMAL
PMV BLD AUTO: 12.7 FL (ref 9.2–12.9)
POTASSIUM SERPL-SCNC: 4.3 MMOL/L (ref 3.5–5.1)
PROT SERPL-MCNC: 5.6 G/DL (ref 6–8.4)
RBC # BLD AUTO: 3.8 M/UL (ref 4–5.4)
SODIUM SERPL-SCNC: 142 MMOL/L (ref 136–145)
WBC # BLD AUTO: 9.37 K/UL (ref 3.9–12.7)

## 2023-05-29 PROCEDURE — 99232 SBSQ HOSP IP/OBS MODERATE 35: CPT | Mod: ,,, | Performed by: INTERNAL MEDICINE

## 2023-05-29 PROCEDURE — 99223 PR INITIAL HOSPITAL CARE,LEVL III: ICD-10-PCS | Mod: ,,, | Performed by: INTERNAL MEDICINE

## 2023-05-29 PROCEDURE — 94761 N-INVAS EAR/PLS OXIMETRY MLT: CPT

## 2023-05-29 PROCEDURE — 25000242 PHARM REV CODE 250 ALT 637 W/ HCPCS: Performed by: INTERNAL MEDICINE

## 2023-05-29 PROCEDURE — 99232 PR SUBSEQUENT HOSPITAL CARE,LEVL II: ICD-10-PCS | Mod: ,,, | Performed by: INTERNAL MEDICINE

## 2023-05-29 PROCEDURE — 80053 COMPREHEN METABOLIC PANEL: CPT | Performed by: INTERNAL MEDICINE

## 2023-05-29 PROCEDURE — 25000003 PHARM REV CODE 250: Performed by: INTERNAL MEDICINE

## 2023-05-29 PROCEDURE — 99223 1ST HOSP IP/OBS HIGH 75: CPT | Mod: ,,, | Performed by: INTERNAL MEDICINE

## 2023-05-29 PROCEDURE — 94660 CPAP INITIATION&MGMT: CPT

## 2023-05-29 PROCEDURE — 63600175 PHARM REV CODE 636 W HCPCS: Performed by: INTERNAL MEDICINE

## 2023-05-29 PROCEDURE — 27000221 HC OXYGEN, UP TO 24 HOURS

## 2023-05-29 PROCEDURE — 85025 COMPLETE CBC W/AUTO DIFF WBC: CPT | Performed by: INTERNAL MEDICINE

## 2023-05-29 PROCEDURE — 12000002 HC ACUTE/MED SURGE SEMI-PRIVATE ROOM

## 2023-05-29 PROCEDURE — 99900035 HC TECH TIME PER 15 MIN (STAT)

## 2023-05-29 PROCEDURE — 94760 N-INVAS EAR/PLS OXIMETRY 1: CPT

## 2023-05-29 PROCEDURE — 94640 AIRWAY INHALATION TREATMENT: CPT

## 2023-05-29 PROCEDURE — 36415 COLL VENOUS BLD VENIPUNCTURE: CPT | Performed by: INTERNAL MEDICINE

## 2023-05-29 PROCEDURE — 25000003 PHARM REV CODE 250

## 2023-05-29 PROCEDURE — 99900031 HC PATIENT EDUCATION (STAT)

## 2023-05-29 RX ORDER — METOCLOPRAMIDE HYDROCHLORIDE 5 MG/ML
5 INJECTION INTRAMUSCULAR; INTRAVENOUS EVERY 6 HOURS PRN
Status: DISCONTINUED | OUTPATIENT
Start: 2023-05-29 | End: 2023-06-01 | Stop reason: HOSPADM

## 2023-05-29 RX ADMIN — TICAGRELOR 90 MG: 90 TABLET ORAL at 09:05

## 2023-05-29 RX ADMIN — ALLOPURINOL 50 MG: 100 TABLET ORAL at 09:05

## 2023-05-29 RX ADMIN — METOPROLOL TARTRATE 50 MG: 50 TABLET, FILM COATED ORAL at 09:05

## 2023-05-29 RX ADMIN — HEPARIN SODIUM 5000 UNITS: 5000 INJECTION, SOLUTION INTRAVENOUS; SUBCUTANEOUS at 03:05

## 2023-05-29 RX ADMIN — MUPIROCIN 1 G: 20 OINTMENT TOPICAL at 09:05

## 2023-05-29 RX ADMIN — ASPIRIN 81 MG CHEWABLE TABLET 81 MG: 81 TABLET CHEWABLE at 09:05

## 2023-05-29 RX ADMIN — HYDRALAZINE HYDROCHLORIDE 25 MG: 25 TABLET ORAL at 09:05

## 2023-05-29 RX ADMIN — ATORVASTATIN CALCIUM 40 MG: 40 TABLET, FILM COATED ORAL at 09:05

## 2023-05-29 RX ADMIN — DILTIAZEM HYDROCHLORIDE 120 MG: 120 CAPSULE, COATED, EXTENDED RELEASE ORAL at 09:05

## 2023-05-29 RX ADMIN — CHLORHEXIDINE GLUCONATE 15 ML: 1.2 RINSE ORAL at 09:05

## 2023-05-29 RX ADMIN — RANOLAZINE 500 MG: 500 TABLET, EXTENDED RELEASE ORAL at 09:05

## 2023-05-29 RX ADMIN — IPRATROPIUM BROMIDE AND ALBUTEROL SULFATE 3 ML: .5; 3 SOLUTION RESPIRATORY (INHALATION) at 01:05

## 2023-05-29 RX ADMIN — CEFUROXIME AXETIL 250 MG: 250 TABLET, FILM COATED ORAL at 09:05

## 2023-05-29 RX ADMIN — PREDNISONE 30 MG: 20 TABLET ORAL at 09:05

## 2023-05-29 RX ADMIN — HEPARIN SODIUM 5000 UNITS: 5000 INJECTION, SOLUTION INTRAVENOUS; SUBCUTANEOUS at 09:05

## 2023-05-29 RX ADMIN — PANTOPRAZOLE SODIUM 40 MG: 40 TABLET, DELAYED RELEASE ORAL at 05:05

## 2023-05-29 RX ADMIN — IPRATROPIUM BROMIDE AND ALBUTEROL SULFATE 3 ML: .5; 3 SOLUTION RESPIRATORY (INHALATION) at 08:05

## 2023-05-29 RX ADMIN — CASTOR OIL AND BALSAM, PERU: 788; 87 OINTMENT TOPICAL at 09:05

## 2023-05-29 RX ADMIN — HEPARIN SODIUM 5000 UNITS: 5000 INJECTION, SOLUTION INTRAVENOUS; SUBCUTANEOUS at 05:05

## 2023-05-29 NOTE — SUBJECTIVE & OBJECTIVE
Interval History: See HPI    Past Medical History:   Diagnosis Date    CAD (coronary artery disease)     Cancer     colon    CHF (congestive heart failure)     Colitis     Colon cancer     COPD (chronic obstructive pulmonary disease)     Decreased hearing     Diabetes mellitus     Diabetes mellitus, type 2     Hypercholesterolemia     Hypertension     Hypoxemia     Insomnia     Obesity     Osteoarthritis        Past Surgical History:   Procedure Laterality Date    ANGIOGRAM, CORONARY, WITH LEFT HEART CATHETERIZATION N/A 2/10/2022    Procedure: Angiogram, Coronary, with Left Heart Cath;  Surgeon: Cristian Benjamin MD;  Location: [facility] CATH/EP LAB;  Service: Cardiology;  Laterality: N/A;    CARDIAC CATHETERIZATION      CHOLECYSTECTOMY      COLECTOMY      CORONARY ANGIOPLASTY      CORONARY STENT PLACEMENT      ERCP N/A 1/16/2023    Procedure: ERCP (ENDOSCOPIC RETROGRADE CHOLANGIOPANCREATOGRAPHY);  Surgeon: Biju Kramer III, MD;  Location: [facility] ENDO;  Service: Endoscopy;  Laterality: N/A;    ESOPHAGOGASTRODUODENOSCOPY N/A 1/29/2023    Procedure: EGD (ESOPHAGOGASTRODUODENOSCOPY);  Surgeon: Aarti Alvarez MD;  Location: [facility] ENDO;  Service: Endoscopy;  Laterality: N/A;    EXTERNAL EAR SURGERY      EYE SURGERY      FLEXIBLE SIGMOIDOSCOPY N/A 9/19/2019    Procedure: SIGMOIDOSCOPY, FLEXIBLE;  Surgeon: Biju Kramer III, MD;  Location: [facility];  Service: Endoscopy;  Laterality: N/A;    HYSTERECTOMY      partial       Review of patient's allergies indicates:   Allergen Reactions    Iodinated contrast media     Strawberries [strawberry] Hives and Itching       Medications:  Continuous Infusions:  Scheduled Meds:   albuterol-ipratropium  3 mL Nebulization Q6H    allopurinoL  50 mg Oral Daily    aspirin  81 mg Oral Daily    atorvastatin  40 mg Oral Daily    balsam peru-castor oiL   Topical (Top) BID    cefUROXime  250 mg Oral Q12H    chlorhexidine  15 mL Mouth/Throat BID    diltiaZEM  120 mg Oral Daily     heparin (porcine)  5,000 Units Subcutaneous Q8H    hydrALAZINE  25 mg Oral Q12H    metoprolol tartrate  50 mg Oral BID    mupirocin   Nasal BID    pantoprazole  40 mg Oral Daily    predniSONE  30 mg Oral Daily    ranolazine  500 mg Oral BID    ticagrelor  90 mg Oral Q12H     PRN Meds:dextrose 50%, dextrose 50%, glucagon (human recombinant), glucagon (human recombinant), glucose, glucose, hydrALAZINE, lorazepam, metoclopramide HCl, midodrine, morphine, naloxone, potassium bicarbonate, potassium bicarbonate, potassium bicarbonate, sodium chloride 0.9%    Family History       Problem Relation (Age of Onset)    Febrile seizures Mother    Heart failure Father          Tobacco Use    Smoking status: Former     Packs/day: 3.00     Years: 50.00     Pack years: 150.00     Types: Cigarettes     Start date: 3/30/1964     Quit date: 2006     Years since quittin.2    Smokeless tobacco: Never    Tobacco comments:     ex smoker 15 years   Substance and Sexual Activity    Alcohol use: Yes    Drug use: No    Sexual activity: Never       Review of Systems  Objective:     Vital Signs (Most Recent):  Temp: 98.1 °F (36.7 °C) (23 1143)  Pulse: 64 (23 1348)  Resp: 18 (23 1348)  BP: (!) 142/58 (nurse mendel notified) (23 1143)  SpO2: 98 % (23 1348) Vital Signs (24h Range):  Temp:  [97.8 °F (36.6 °C)-98.4 °F (36.9 °C)] 98.1 °F (36.7 °C)  Pulse:  [56-90] 64  Resp:  [15-22] 18  SpO2:  [95 %-100 %] 98 %  BP: (140-178)/(58-77) 142/58     Weight: 68.8 kg (151 lb 10.8 oz)  Body mass index is 26.45 kg/m².       Physical Exam  Vitals reviewed.   Constitutional:       General: She is not in acute distress.     Appearance: She is obese. She is ill-appearing.   HENT:      Head: Normocephalic and atraumatic.      Right Ear: External ear normal.      Left Ear: External ear normal.      Nose: Nose normal. No congestion.      Mouth/Throat:      Mouth: Mucous membranes are moist.      Pharynx: No oropharyngeal  exudate.   Eyes:      General:         Right eye: No discharge.         Left eye: No discharge.      Extraocular Movements: Extraocular movements intact.   Cardiovascular:      Rate and Rhythm: Normal rate and regular rhythm.      Pulses: Normal pulses.   Pulmonary:      Effort: Pulmonary effort is normal. No respiratory distress.   Abdominal:      General: There is no distension.      Palpations: Abdomen is soft.      Tenderness: There is no abdominal tenderness.   Skin:     General: Skin is warm.   Neurological:      General: No focal deficit present.      Mental Status: She is alert and oriented to person, place, and time.   Psychiatric:         Mood and Affect: Mood normal.         Behavior: Behavior normal.         Thought Content: Thought content normal.          Review of Symptoms      Symptom Assessment (ESAS 0-10 Scale)  Pain:  0  Dyspnea:  4  Anxiety:  0  Nausea:  0  Depression:  0  Anorexia:  0  Fatigue:  6  Insomnia:  0  Restlessness:  0  Agitation:  0         Performance Status:  60    Living Arrangements:  Lives with family and Lives in home    Psychosocial/Cultural:   See Palliative Psychosocial Note: No  **Primary  to Follow**  Palliative Care  Consult: No      Advance Care Planning   Advance Directives:     Decision Making:  Patient answered questions  Goals of Care: What is most important right now is to focus on extending life as long as possible, even it it means sacrificing quality. Accordingly, we have decided that the best plan to meet the patient's goals includes continuing with treatment.       Significant Labs: BMP:   Recent Labs   Lab 05/29/23 0649   *      K 4.3      CO2 33*   BUN 57*   CREATININE 1.8*   CALCIUM 8.7     CBC:   Recent Labs   Lab 05/28/23  0406 05/29/23 0649   WBC 8.73 9.37   HGB 9.7* 10.3*   HCT 32.1* 34.5*    185     CBC:   Recent Labs   Lab 05/29/23 0649   WBC 9.37   HGB 10.3*   HCT 34.5*   MCV 91         BMP:  Recent Labs   Lab 05/29/23  0649   *      K 4.3      CO2 33*   BUN 57*   CREATININE 1.8*   CALCIUM 8.7     LFT:  Lab Results   Component Value Date    AST 34 05/29/2023    ALKPHOS 70 05/29/2023    BILITOT 0.6 05/29/2023     Albumin:   Albumin   Date Value Ref Range Status   05/29/2023 2.7 (L) 3.5 - 5.2 g/dL Final     Protein:   Total Protein   Date Value Ref Range Status   05/29/2023 5.6 (L) 6.0 - 8.4 g/dL Final     Lactic acid:   Lab Results   Component Value Date    LACTATE 1.0 01/27/2023    LACTATE 1.8 01/16/2023       Significant Imaging: I have reviewed all pertinent imaging results/findings within the past 24 hours.

## 2023-05-29 NOTE — PROGRESS NOTES
Pharmacist Renal Dose Adjustment Note    Marisol Kerr is a 77 y.o. female being treated with the medication metoclopramide    Patient Data:    Vital Signs (Most Recent):  Temp: 97.8 °F (36.6 °C) (05/29/23 0738)  Pulse: 68 (05/29/23 0814)  Resp: 18 (05/29/23 0814)  BP: (!) 146/77 (nurse mendel notified) (05/29/23 0738)  SpO2: 97 % (05/29/23 0814) Vital Signs (72h Range):  Temp:  [97.6 °F (36.4 °C)-98.7 °F (37.1 °C)]   Pulse:  [48-91]   Resp:  [14-44]   BP: ()/()   SpO2:  [92 %-100 %]      Recent Labs   Lab 05/27/23  0447 05/28/23  0406 05/29/23  0649   CREATININE 2.2* 2.1* 1.8*     Serum creatinine: 1.8 mg/dL (H) 05/29/23 0649  Estimated creatinine clearance: 25.5 mL/min (A)    Metoclopramide 10 mg IV every 6 hour prn will be changed to metoclopramide 5 mg IV every 6 hour prn    Pharmacist's Name: Bhavani Townsend  Pharmacist's Extension: 0580

## 2023-05-29 NOTE — PROGRESS NOTES
"Pulmonary/Critical Care  Progress Note      Patient name: Marisol Kerr  MRN: [id number]  Date: 05/29/2023    Admit Date: 5/25/2023  Consult Requested By: Vamshi Young MD    Reason for Consult: AECOPD, respiratory failure    HPI:    5/26/2023 - 76 yo ho COPD (severe, home O2), ASCVD, diastolic heart failure, CKD3, obesity with several admissions this year came to ER with increased SOB, worsened saturations.  Was admitted and placed on BiPAP.  Overnight she has done OK and this AM we tried to take her off BiPAP and after a short while she developed increased SOB, felt "like I'm going to die", she was placed back on BiPAP, got some morphine and was started on precedex due to anxiety.  I came back to revisit her and she indicates that if she worsens she would like to be intubated.  ROS is difficult due to hearing.  CT chest reviewed.  EKG noted has elevated QTc    5/27/2023 - Looks and feels better this AM, off BiPAP and breathing is much more comfortable.  No new complaints.  She does not show much insight into her condition.  Creatinine has increased    5/28/2023 - Stable overnight, no new issues reported, she feels better overall.    5/29/2023 - Stable overnight, no new issues reported.  Feels better and is asking about going home.    Review of Systems    Review of Systems   Constitutional:  Positive for malaise/fatigue. Negative for chills, diaphoresis, fever and weight loss.   HENT:  Positive for hearing loss. Negative for congestion.    Eyes:  Negative for pain.   Respiratory:  Positive for shortness of breath and wheezing. Negative for cough, hemoptysis, sputum production and stridor.    Cardiovascular:  Positive for chest pain ("heavy"). Negative for palpitations, orthopnea, claudication, leg swelling and PND.   Gastrointestinal:  Negative for abdominal pain, blood in stool, constipation, diarrhea, heartburn, nausea and vomiting.   Genitourinary:  Negative for dysuria, frequency, hematuria and urgency. "   Musculoskeletal:  Negative for falls and myalgias.   Neurological:  Positive for weakness. Negative for sensory change and focal weakness.   Psychiatric/Behavioral:  Negative for depression, substance abuse and suicidal ideas. The patient is not nervous/anxious.      Past Medical History    Past Medical History:   Diagnosis Date    CAD (coronary artery disease)     Cancer     colon    CHF (congestive heart failure)     Colitis     Colon cancer     COPD (chronic obstructive pulmonary disease)     Decreased hearing     Diabetes mellitus     Diabetes mellitus, type 2     Hypercholesterolemia     Hypertension     Hypoxemia     Insomnia     Obesity     Osteoarthritis        Past Surgical History    Past Surgical History:   Procedure Laterality Date    ANGIOGRAM, CORONARY, WITH LEFT HEART CATHETERIZATION N/A 2/10/2022    Procedure: Angiogram, Coronary, with Left Heart Cath;  Surgeon: Cristian Benjamin MD;  Location: [facility] CATH/EP LAB;  Service: Cardiology;  Laterality: N/A;    CARDIAC CATHETERIZATION      CHOLECYSTECTOMY      COLECTOMY      CORONARY ANGIOPLASTY      CORONARY STENT PLACEMENT      ERCP N/A 1/16/2023    Procedure: ERCP (ENDOSCOPIC RETROGRADE CHOLANGIOPANCREATOGRAPHY);  Surgeon: Biju Kramer III, MD;  Location: [facility] ENDO;  Service: Endoscopy;  Laterality: N/A;    ESOPHAGOGASTRODUODENOSCOPY N/A 1/29/2023    Procedure: EGD (ESOPHAGOGASTRODUODENOSCOPY);  Surgeon: Aarti Alvarez MD;  Location: [facility];  Service: Endoscopy;  Laterality: N/A;    EXTERNAL EAR SURGERY      EYE SURGERY      FLEXIBLE SIGMOIDOSCOPY N/A 9/19/2019    Procedure: SIGMOIDOSCOPY, FLEXIBLE;  Surgeon: Biju Kramer III, MD;  Location: [facility];  Service: Endoscopy;  Laterality: N/A;    HYSTERECTOMY      partial       Medications (scheduled):      albuterol-ipratropium  3 mL Nebulization Q6H    allopurinoL  50 mg Oral Daily    aspirin  81 mg Oral Daily    atorvastatin  40 mg Oral Daily    balsam peru-castor oiL   Topical  "(Top) BID    cefUROXime  250 mg Oral Q12H    chlorhexidine  15 mL Mouth/Throat BID    diltiaZEM  120 mg Oral Daily    heparin (porcine)  5,000 Units Subcutaneous Q8H    hydrALAZINE  25 mg Oral Q12H    metoprolol tartrate  50 mg Oral BID    mupirocin   Nasal BID    pantoprazole  40 mg Oral Daily    predniSONE  30 mg Oral Daily    ranolazine  500 mg Oral BID    ticagrelor  90 mg Oral Q12H       Medications (infusions):           Medications (prn):     dextrose 50%, dextrose 50%, glucagon (human recombinant), glucagon (human recombinant), glucose, glucose, hydrALAZINE, lorazepam, metoclopramide HCl, midodrine, morphine, naloxone, potassium bicarbonate, potassium bicarbonate, potassium bicarbonate, sodium chloride 0.9%    Family History:   Family History   Problem Relation Age of Onset    Febrile seizures Mother     Heart failure Father        Social History: Tobacco:   Social History     Tobacco Use   Smoking Status Former    Packs/day: 3.00    Years: 50.00    Pack years: 150.00    Types: Cigarettes    Start date: 3/30/1964    Quit date: 2006    Years since quittin.2   Smokeless Tobacco Never   Tobacco Comments    ex smoker 15 years                                EtOH:   Social History     Substance and Sexual Activity   Alcohol Use Yes                                Drugs:   Social History     Substance and Sexual Activity   Drug Use No       Physical Exam    Vital signs:  Temp:  [97.8 °F (36.6 °C)-98.4 °F (36.9 °C)]   Pulse:  [62-90]   Resp:  [15-22]   BP: (140-178)/(61-82)   SpO2:  [95 %-100 %]     Intake/Output:   Intake/Output Summary (Last 24 hours) at 2023 0925  Last data filed at 2023 0608  Gross per 24 hour   Intake 220 ml   Output 600 ml   Net -380 ml          BMI: Estimated body mass index is 26.45 kg/m² as calculated from the following:    Height as of this encounter: 5' 3.5" (1.613 m).    Weight as of this encounter: 68.8 kg (151 lb 10.8 oz).    Physical Exam  Vitals and nursing note " reviewed.   Constitutional:       General: She is not in acute distress.     Appearance: Normal appearance. She is obese. She is ill-appearing (chronically). She is not toxic-appearing or diaphoretic.      Comments: Looks better today   HENT:      Head: Normocephalic and atraumatic.      Right Ear: External ear normal.      Left Ear: External ear normal.      Nose: Nose normal. No congestion or rhinorrhea.      Mouth/Throat:      Mouth: Mucous membranes are moist.      Pharynx: Oropharynx is clear. No oropharyngeal exudate or posterior oropharyngeal erythema.   Eyes:      General: No scleral icterus.        Right eye: No discharge.         Left eye: No discharge.      Extraocular Movements: Extraocular movements intact.      Conjunctiva/sclera: Conjunctivae normal.      Pupils: Pupils are equal, round, and reactive to light.   Neck:      Vascular: No carotid bruit.   Cardiovascular:      Rate and Rhythm: Normal rate and regular rhythm.      Pulses: Normal pulses.      Heart sounds: No murmur heard.    No friction rub. No gallop.   Pulmonary:      Effort: Pulmonary effort is normal. No respiratory distress.      Breath sounds: No stridor. No wheezing, rhonchi or rales.      Comments: Decreased BS throughout  + Friedman's sign  Labored at times  Chest:      Chest wall: No tenderness.   Abdominal:      General: Bowel sounds are normal. There is no distension.      Tenderness: There is no abdominal tenderness. There is no guarding.      Comments: Obese    Musculoskeletal:         General: No swelling. Normal range of motion.      Cervical back: Normal range of motion and neck supple. No rigidity or tenderness.      Right lower leg: No edema.      Left lower leg: No edema.   Lymphadenopathy:      Cervical: No cervical adenopathy.   Skin:     General: Skin is warm and dry.      Capillary Refill: Capillary refill takes less than 2 seconds.      Coloration: Skin is not jaundiced.      Findings: No bruising.   Neurological:       General: No focal deficit present.      Mental Status: She is alert and oriented to person, place, and time. Mental status is at baseline.      Cranial Nerves: No cranial nerve deficit.      Sensory: No sensory deficit.      Motor: No weakness.      Comments: [facility]   Psychiatric:         Mood and Affect: Mood normal.         Behavior: Behavior normal.         Thought Content: Thought content normal.         Judgment: Judgment normal.      Comments: Anxious        Laboratory    Recent Labs   Lab 05/29/23  0649   WBC 9.37   RBC 3.80*   HGB 10.3*   HCT 34.5*      MCV 91   MCH 27.1   MCHC 29.9*         Recent Labs   Lab 05/29/23  0649   CALCIUM 8.7   PROT 5.6*      K 4.3   CO2 33*      BUN 57*   CREATININE 1.8*   ALKPHOS 70   ALT 24   AST 34   BILITOT 0.6         No results for input(s): PT, INR, APTT in the last 24 hours.      No results for input(s): CPK, CPKMB, TROPONINI, MB in the last 24 hours.    Additional labs: reviewed    Microbiology:       Microbiology Results (last 7 days)       ** No results found for the last 168 hours. **            Radiology    Echo  · The left ventricle is normal in size with concentric remodeling and   normal systolic function.  · The estimated ejection fraction is 65%.  · Indeterminate left ventricular diastolic function.  · Mild to moderate tricuspid regurgitation.  · Mild pulmonic regurgitation.  · Normal right ventricular size with normal right ventricular systolic   function.  · Normal central venous pressure (3 mmHg).  · The estimated PA systolic pressure is 32 mmHg.  · Mild mitral regurgitation.     X-Ray Chest AP Portable  CLINICAL HISTORY:  77 years (1945) Female sob Shortness of breath; Hx of cancer, chf, cad, copd, diabetes, htn    TECHNIQUE:  Portable AP radiograph the chest.    COMPARISON:  Radiograph from May 25, 2023    FINDINGS:  There are unchanged faint linear opacities at the lung bases suggestive of atelectasis/scarring, aspiration/pneumonia  or trace edema in the appropriate clinical setting. There is blunting of the costophrenic angles suggestive of either atelectasis, scarring or trace pleural fluid. There is an ill-defined masslike density posterior to the right hilum highly suspicious for malignancy. The heart is normal in size. Atheromatous calcifications are seen at the aortic arch. Osseous structures show degenerative disc disease and degenerative changes in the shoulders. The visualized upper abdomen is unremarkable.    IMPRESSION:  Unchanged radiograph of the chest when accounting for differences in imaging technique.    Electronically signed by:  García Russell MD  5/26/2023 10:29 AM CDT Workstation: [id number]  US Lower Extremity Veins Right  US LOWER EXTREMITY VEINS LIMITED FOLLOW-UP UNILATERAL    Interpretation time/date:  5/26/2023 6:28 AM CDT    History:RLE edema    Comparison: None    Findings: Routine venous ultrasound with color-flow Doppler was performed of the left lower extremity. Deep venous structures of the calf and thigh are patent without acute or chronic DVT observed. There is normal phasicity without gross reflux. The great saphenous vein appears patent. Incidentally a arterial occlusion of the SFA is suggested with common femoral artery mid vessel stenosis.    Impression: No evidence of left lower extremity DVT. 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    Electronically signed by:  Levar Saab MD  5/26/2023 6:29 AM CDT Workstation: [id number]  CT Chest Without Contrast  EXAM:  CT Chest Without Intravenous Contrast    CLINICAL HISTORY:  The patient is 77 years old and is Female; sob    TECHNIQUE:  Axial computed tomography images of the chest without intravenous contrast.  Sagittal and coronal reformatted images were created and reviewed.  This CT exam was performed using one or more of the following dose reduction techniques:  automated exposure control, adjustment of the mA  and/or kV according to patient size, and/or use of iterative reconstruction technique.    COMPARISON:  No relevant prior studies available.    FINDINGS:  LUNGS:  A spiculated mass within the right lower lobe measuring 3.0 x 2.5 cm is present. The lungs are hyperinflated with mild emphysematous change. Minimal bronchial wall thickening of the lower lobe bronchi is noted with adjacent mild surrounding inflammation. Groundglass opacity within the left upper lobe is present.  PLEURAL SPACE:  Unremarkable.  No pneumothorax.  No significant effusion.  HEART:  No cardiomegaly.  No pericardial effusion.  BONES/JOINTS:  Multilevel degenerative change of the spine is present.  SOFT TISSUES:  The soft tissues are normal.  VASCULATURE:  Atherosclerosis of the aorta is present.  No thoracic aortic aneurysm.  LYMPH NODES:  Unremarkable. No enlarged lymph nodes.  TUBES, LINES AND DEVICES:  A right chest port is present with the tip in the SVC.    IMPRESSION:  1.  Spiculated mass within the right lower lobe. Consider a non-contrast Chest CT at 3 months, a PET/CT, or tissue sampling.    2.  Findings suggest mild bronchitis with developing infiltrate in the left upper lobe.    Electronically signed by:  Erica Ornelas MD  5/26/2023 12:53 AM CDT Workstation: [id number]        Additional Studies    reviewed    Ventilator Information    Oxygen Concentration (%):  [30] 30             No results for input(s): PH, PCO2, PO2, HCO3, POCSATURATED, BE in the last 72 hours.        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       Plan    BiPAP as needed and when sleeping and O2 as able - she should have BiPAP at home  Steroids, respiratory treatments, antibiotics - see orders  Cardiology following  Prophylactic lovenox  I am not convinced that there is an acute cardiac issues here  Watch renal function - better today  Increase activity as able  ? DC home today or tomorrow    Thank you for this consult.  I will follow with you while the patient is hospitalized.        Dereje Saul MD  [facility] Pulmonary/Critical Care

## 2023-05-29 NOTE — PT/OT/SLP PROGRESS
Physical Therapy      Patient Name:  Marisol Kerr   MRN:  [id number]    Patient not seen today secondary to Other (Comment), Patient unwilling to participate (Pt reported being SOB.). Will follow-up 5/30/23.

## 2023-05-29 NOTE — CARE UPDATE
05/28/23 2042   Patient Assessment/Suction   Level of Consciousness (AVPU) alert   Respiratory Effort Normal;Unlabored   Expansion/Accessory Muscles/Retractions no retractions;no use of accessory muscles   All Lung Fields Breath Sounds diminished   RLL Breath Sounds crackles   Rhythm/Pattern, Respiratory no shortness of breath reported;depth regular;pattern regular;unlabored   Cough Frequency frequent   Cough Type congested   PRE-TX-O2   Device (Oxygen Therapy) nasal cannula   $ Is the patient on Low Flow Oxygen? Yes   Flow (L/min) 2   SpO2 95 %   Pulse Oximetry Type Intermittent   $ Pulse Oximetry - Multiple Charge Pulse Oximetry - Multiple   Pulse 89   Resp 18   Aerosol Therapy   $ Aerosol Therapy Charges Aerosol Treatment   Daily Review of Necessity (SVN) completed   Respiratory Treatment Status (SVN) given   Treatment Route (SVN) mask;oxygen   Patient Position (SVN) semi-Solorzano's   Post Treatment Assessment (SVN) breath sounds unchanged   Signs of Intolerance (SVN) none   Breath Sounds Post-Respiratory Treatment   Throughout All Fields Post-Treatment All Fields   Throughout All Fields Post-Treatment no change   Post-treatment Heart Rate (beats/min) 90   Post-treatment Resp Rate (breaths/min) 18   Preset CPAP/BiPAP Settings   Mode Of Delivery BiPAP;Standby   $ CPAP/BiPAP Daily Charge BiPAP/CPAP Daily   $ Is patient using? No/refused   Reason patient is not wearing? Patient refused   Education   $ Education Bronchodilator;15 min   Respiratory Evaluation   $ Care Plan Tech Time 15 min   $ Eval/Re-eval Charges Re-evaluation   Evaluation For   (care plan)

## 2023-05-29 NOTE — PHYSICIAN QUERY
"PT Name: Marisol Kerr  MR #: [id number]     DOCUMENTATION CLARIFICATION     CDS/: Lucille Del Rosario               Contact [phone] or Epic messenger  This form is a permanent document in the medical record.     Query Date: May 29, 2023    By submitting this query, we are merely seeking further clarification of documentation.  Please utilize your independent clinical judgment when addressing the question(s) below.    The Medical Record contains the following   Clinical Information Location in Medical Record    Risk Factors:  Hx of multivessel CAD w/stent  Severe COPD    Chronic diastolic HF   H&P      Cardiology Consult Note    Clinical Indicators:  BNP on admit was 622    "There are bilateral airspace opacities with interstitial and central vascular prominence. These findings may reflect evidence of edema."    Acute hypoxemic & hypercapnic resp failure likely 2/2 CHF/very severe COPD, nstemi    HFpEF   Labs        CXR 5/25        H&P & Hospitalist PN's      Cardiology PN's    Interventions:   BNP, ECHO, CXR    Lasix 80 mg IVx's 1 (5/25)  Lasix 40mg IV x's 1 (5/26)  Metoprolol 50mg bid  Hydralazine 50mg q12 (5/26-5/27)  Hydralazine 25mg q12    Strict I/O, daily wt,    Labs      MAR          Card PN 5/27     .    Provider, please specify the diagnosis associated with the above clinical findings.    [x   ]  Acute on Chronic Diastolic Heart Failure   [   ]  Chronic Diastolic Heart Failure    [   ]  Other (please specify):          Form No. [id number]      "

## 2023-05-29 NOTE — CONSULTS
[facility]  Palliative Medicine  Consult Note    Patient Name: Marisol Kerr  MRN: [id number]  Admission Date: 5/25/2023  Hospital Length of Stay: 4 days  Code Status: Full Code   Attending Provider: Vamshi oYung MD  Consulting Provider: Cristofer Kiser MD  Primary Care Physician: Khadar Dwyer MD  Principal Problem:Shortness of breath    Patient information was obtained from patient, past medical records and primary team.      Consults  Assessment/Plan:     Palliative Care  Goals of care, counseling/discussion  I reviewed her chart discussed her case with her team.    Examined Ms. Kerr at bedside.  She seems to maintain capacity for complex medical decision making.      I introduced myself and my role as palliative care physician.  She was agreeable to speaking.    We discussed to medical illness, prognosis, and values in detail.      Briefly, she understands she has advanced lung disease and heart disease.    In discussing prognosis, I used the surprise question as a guide.  Meaning I would not be surprised if she had a complication in the coming year that resulted in her death.  She understood and was surprised by this news.    We discussed her values.  She explains she is fighting to live.  Living means returning home and spending time with her family.  As long as she is able to maintain her mentation and interact with her family she can find quality of life and is willing to pursue multiple repeated hospitalizations and procedures in hopes of regaining this quality of life.  Her only worry is suffering; she would not desire to pursue measures that would only prolong pain and suffering.    Her goals are not hospice oriented today.    I did take a moment to discuss code status and clinical reality is a code in her situation.  She would desire attempts at resuscitation.  She also explains that she would not wish to live on machines indefinitely.    We spoke of it back and forth.  She could  use continued education on the state of her illness.     At this point, her goals are aligned with current plan of care.  I recommended to continue supportive measures being hope for the best and prepared her worse.  She understood and agreed.    She explains that she and her daughter have had a very isidoro discussions about all of her wishes.  If she ever becomes incapacitated her daughter knows all of her wishes.    I appreciate being involved.    I will not plan to see her daily.  If her condition changes or I can helpful, please call.  With be happy to revisit.        Thank you for your consult. I will sign off. Please contact us if you have any additional questions.    Subjective:     HPI:   77-year-old female with multiple medical problems significant for coronary artery disease status post PCI, advanced COPD on home oxygen, heart failure with preserved ejection fraction, obesity, chronic kidney disease stage 3, type 2 diabetes.  She is currently admitted and being treated for acute on chronic respiratory failure with hypoxia and hypercapnia secondary to COPD.  She seems to be recovering in his likely to discharge home in the coming days.  Nonetheless, given her age, multiple comorbidities, and multiple repeat hospitalizations she carries a guarded and potentially very poor long-term prognosis.  I have been asked to assist with goals of care.      Hospital Course:  No notes on file    Interval History: See HPI    Past Medical History:   Diagnosis Date    CAD (coronary artery disease)     Cancer     colon    CHF (congestive heart failure)     Colitis     Colon cancer     COPD (chronic obstructive pulmonary disease)     Decreased hearing     Diabetes mellitus     Diabetes mellitus, type 2     Hypercholesterolemia     Hypertension     Hypoxemia     Insomnia     Obesity     Osteoarthritis        Past Surgical History:   Procedure Laterality Date    ANGIOGRAM, CORONARY, WITH LEFT HEART  CATHETERIZATION N/A 2/10/2022    Procedure: Angiogram, Coronary, with Left Heart Cath;  Surgeon: Cristian Benjamin MD;  Location: [facility] CATH/EP LAB;  Service: Cardiology;  Laterality: N/A;    CARDIAC CATHETERIZATION      CHOLECYSTECTOMY      COLECTOMY      CORONARY ANGIOPLASTY      CORONARY STENT PLACEMENT      ERCP N/A 1/16/2023    Procedure: ERCP (ENDOSCOPIC RETROGRADE CHOLANGIOPANCREATOGRAPHY);  Surgeon: Biju Kramer III, MD;  Location: [facility] ENDO;  Service: Endoscopy;  Laterality: N/A;    ESOPHAGOGASTRODUODENOSCOPY N/A 1/29/2023    Procedure: EGD (ESOPHAGOGASTRODUODENOSCOPY);  Surgeon: Aarti Alvarez MD;  Location: [facility] ENDO;  Service: Endoscopy;  Laterality: N/A;    EXTERNAL EAR SURGERY      EYE SURGERY      FLEXIBLE SIGMOIDOSCOPY N/A 9/19/2019    Procedure: SIGMOIDOSCOPY, FLEXIBLE;  Surgeon: Biju Kramer III, MD;  Location: [facility] ENDO;  Service: Endoscopy;  Laterality: N/A;    HYSTERECTOMY      partial       Review of patient's allergies indicates:   Allergen Reactions    Iodinated contrast media     Strawberries [strawberry] Hives and Itching       Medications:  Continuous Infusions:  Scheduled Meds:   albuterol-ipratropium  3 mL Nebulization Q6H    allopurinoL  50 mg Oral Daily    aspirin  81 mg Oral Daily    atorvastatin  40 mg Oral Daily    balsam peru-castor oiL   Topical (Top) BID    cefUROXime  250 mg Oral Q12H    chlorhexidine  15 mL Mouth/Throat BID    diltiaZEM  120 mg Oral Daily    heparin (porcine)  5,000 Units Subcutaneous Q8H    hydrALAZINE  25 mg Oral Q12H    metoprolol tartrate  50 mg Oral BID    mupirocin   Nasal BID    pantoprazole  40 mg Oral Daily    predniSONE  30 mg Oral Daily    ranolazine  500 mg Oral BID    ticagrelor  90 mg Oral Q12H     PRN Meds:dextrose 50%, dextrose 50%, glucagon (human recombinant), glucagon (human recombinant), glucose, glucose, hydrALAZINE, lorazepam, metoclopramide HCl, midodrine, morphine, naloxone, potassium  bicarbonate, potassium bicarbonate, potassium bicarbonate, sodium chloride 0.9%    Family History       Problem Relation (Age of Onset)    Febrile seizures Mother    Heart failure Father          Tobacco Use    Smoking status: Former     Packs/day: 3.00     Years: 50.00     Pack years: 150.00     Types: Cigarettes     Start date: 3/30/1964     Quit date: 2006     Years since quittin.2    Smokeless tobacco: Never    Tobacco comments:     ex smoker 15 years   Substance and Sexual Activity    Alcohol use: Yes    Drug use: No    Sexual activity: Never       Review of Systems  Objective:     Vital Signs (Most Recent):  Temp: 98.1 °F (36.7 °C) (23 1143)  Pulse: 64 (23 1348)  Resp: 18 (23 1348)  BP: (!) 142/58 (nurse mendel notified) (23 1143)  SpO2: 98 % (23 1348) Vital Signs (24h Range):  Temp:  [97.8 °F (36.6 °C)-98.4 °F (36.9 °C)] 98.1 °F (36.7 °C)  Pulse:  [56-90] 64  Resp:  [15-22] 18  SpO2:  [95 %-100 %] 98 %  BP: (140-178)/(58-77) 142/58     Weight: 68.8 kg (151 lb 10.8 oz)  Body mass index is 26.45 kg/m².       Physical Exam  Vitals reviewed.   Constitutional:       General: She is not in acute distress.     Appearance: She is obese. She is ill-appearing.   HENT:      Head: Normocephalic and atraumatic.      Right Ear: External ear normal.      Left Ear: External ear normal.      Nose: Nose normal. No congestion.      Mouth/Throat:      Mouth: Mucous membranes are moist.      Pharynx: No oropharyngeal exudate.   Eyes:      General:         Right eye: No discharge.         Left eye: No discharge.      Extraocular Movements: Extraocular movements intact.   Cardiovascular:      Rate and Rhythm: Normal rate and regular rhythm.      Pulses: Normal pulses.   Pulmonary:      Effort: Pulmonary effort is normal. No respiratory distress.   Abdominal:      General: There is no distension.      Palpations: Abdomen is soft.      Tenderness: There is no abdominal tenderness.    Skin:     General: Skin is warm.   Neurological:      General: No focal deficit present.      Mental Status: She is alert and oriented to person, place, and time.   Psychiatric:         Mood and Affect: Mood normal.         Behavior: Behavior normal.         Thought Content: Thought content normal.          Review of Symptoms      Symptom Assessment (ESAS 0-10 Scale)  Pain:  0  Dyspnea:  4  Anxiety:  0  Nausea:  0  Depression:  0  Anorexia:  0  Fatigue:  6  Insomnia:  0  Restlessness:  0  Agitation:  0         Performance Status:  60    Living Arrangements:  Lives with family and Lives in home    Psychosocial/Cultural:   See Palliative Psychosocial Note: No  **Primary  to Follow**  Palliative Care  Consult: No      Advance Care Planning   Advance Directives:     Decision Making:  Patient answered questions  Goals of Care: What is most important right now is to focus on extending life as long as possible, even it it means sacrificing quality. Accordingly, we have decided that the best plan to meet the patient's goals includes continuing with treatment.       Significant Labs: BMP:   Recent Labs   Lab 05/29/23  0649   *      K 4.3      CO2 33*   BUN 57*   CREATININE 1.8*   CALCIUM 8.7     CBC:   Recent Labs   Lab 05/28/23  0406 05/29/23  0649   WBC 8.73 9.37   HGB 9.7* 10.3*   HCT 32.1* 34.5*    185     CBC:   Recent Labs   Lab 05/29/23  0649   WBC 9.37   HGB 10.3*   HCT 34.5*   MCV 91        BMP:  Recent Labs   Lab 05/29/23  0649   *      K 4.3      CO2 33*   BUN 57*   CREATININE 1.8*   CALCIUM 8.7     LFT:  Lab Results   Component Value Date    AST 34 05/29/2023    ALKPHOS 70 05/29/2023    BILITOT 0.6 05/29/2023     Albumin:   Albumin   Date Value Ref Range Status   05/29/2023 2.7 (L) 3.5 - 5.2 g/dL Final     Protein:   Total Protein   Date Value Ref Range Status   05/29/2023 5.6 (L) 6.0 - 8.4 g/dL Final     Lactic acid:   Lab Results    Component Value Date    LACTATE 1.0 01/27/2023    LACTATE 1.8 01/16/2023       Significant Imaging: I have reviewed all pertinent imaging results/findings within the past 24 hours.        > 50% of 80 min visit spent in chart review, face to face discussion of goals of care,  symptom assessment, coordination of care and emotional support.    Cristofer Kiser MD  Palliative Medicine  [facility]

## 2023-05-29 NOTE — PLAN OF CARE
CM reviewed plan of care.  No changes at this time.  Patient's plan is still to discharge home with family when medically cleared.        05/29/23 154   Discharge Reassessment   Assessment Type Discharge Planning Reassessment   Did the patient's condition or plan change since previous assessment? No   Discharge Plan discussed with: Patient   Discharge Plan A Home with family   Discharge Plan B Home Health   DME Needed Upon Discharge  none   Why the patient remains in the hospital Requires continued medical care

## 2023-05-29 NOTE — HPI
77-year-old female with multiple medical problems significant for coronary artery disease status post PCI, advanced COPD on home oxygen, heart failure with preserved ejection fraction, obesity, chronic kidney disease stage 3, type 2 diabetes.  She is currently admitted and being treated for acute on chronic respiratory failure with hypoxia and hypercapnia secondary to COPD.  She seems to be recovering in his likely to discharge home in the coming days.  Nonetheless, given her age, multiple comorbidities, and multiple repeat hospitalizations she carries a guarded and potentially very poor long-term prognosis.  I have been asked to assist with goals of care.

## 2023-05-29 NOTE — CARE UPDATE
05/29/23 0814   Patient Assessment/Suction   Level of Consciousness (AVPU) alert   Respiratory Effort Normal;Unlabored   Expansion/Accessory Muscles/Retractions no retractions;no use of accessory muscles   All Lung Fields Breath Sounds wheezes, expiratory   Rhythm/Pattern, Respiratory unlabored;no shortness of breath reported   Cough Frequency frequent   Cough Type assisted   PRE-TX-O2   Device (Oxygen Therapy) nasal cannula   $ Is the patient on Low Flow Oxygen? Yes   Flow (L/min) 2   SpO2 97 %   Pulse Oximetry Type Intermittent   $ Pulse Oximetry - Single Charge Pulse Oximetry - Single   Pulse 68   Resp 18   Aerosol Therapy   $ Aerosol Therapy Charges Aerosol Treatment   Daily Review of Necessity (SVN) completed   Respiratory Treatment Status (SVN) given   Treatment Route (SVN) in-line   Patient Position (SVN) semi-Solorzano's   Post Treatment Assessment (SVN) breath sounds unchanged   Signs of Intolerance (SVN) none   Preset CPAP/BiPAP Settings   Mode Of Delivery BiPAP   $ CPAP/BiPAP Daily Charge BiPAP/CPAP Daily   $ Initial CPAP/BiPAP Setup? No   $ Is patient using? Yes   Size of Mask Small/Medium   Sized Appropriately? Yes   Equipment Type V60   Airway Device Type medium full face mask   Ipap 14   EPAP (cm H2O) 8   Pressure Support (cm H2O) 6   Set Rate (Breaths/Min) 10   ITime (sec) 1   Rise Time (sec) 3   Patient CPAP/BiPAP Settings   Timed Inspiration (Sec) 1   IPAP Rise Time (sec) 3   RR Total (Breaths/Min) 12   Tidal Volume (mL) 879   VE Minute Ventilation (L/min) 10.6 L/min   Peak Inspiratory Pressure (cm H2O) 15   TiTOT (%) 16   Total Leak (L/Min) 98   Patient Trigger - ST Mode Only (%) 82   CPAP/BiPAP Alarms   High Pressure (cm H2O) 40   Low Pressure (cm H2O) 5   Minute Ventilation (L/Min) 3   High RR (breaths/min) 45   Low RR (breaths/min) 8   Apnea (Sec) 20   Education   $ Education Bronchodilator;BiPAP     Pt switched to nasal cannula 2LPM

## 2023-05-29 NOTE — ASSESSMENT & PLAN NOTE
I reviewed her chart discussed her case with her team.    Examined Ms. Kerr at bedside.  She seems to maintain capacity for complex medical decision making.      I introduced myself and my role as palliative care physician.  She was agreeable to speaking.    We discussed to medical illness, prognosis, and values in detail.      Briefly, she understands she has advanced lung disease and heart disease.    In discussing prognosis, I used the surprise question as a guide.  Meaning I would not be surprised if she had a complication in the coming year that resulted in her death.  She understood and was surprised by this news.    We discussed her values.  She explains she is fighting to live.  Living means returning home and spending time with her family.  As long as she is able to maintain her mentation and interact with her family she can find quality of life and is willing to pursue multiple repeated hospitalizations and procedures in hopes of regaining this quality of life.  Her only worry is suffering; she would not desire to pursue measures that would only prolong pain and suffering.    Her goals are not hospice oriented today.    I did take a moment to discuss code status and clinical reality is a code in her situation.  She would desire attempts at resuscitation.  She also explains that she would not wish to live on machines indefinitely.    We spoke of it back and forth.  She could use continued education on the state of her illness.     At this point, her goals are aligned with current plan of care.  I recommended to continue supportive measures being hope for the best and prepared her worse.  She understood and agreed.    She explains that she and her daughter have had a very isidoro discussions about all of her wishes.  If she ever becomes incapacitated her daughter knows all of her wishes.    I appreciate being involved.    I will not plan to see her daily.  If her condition changes or I can helpful, please  call.  With be happy to revisit.

## 2023-05-29 NOTE — PROGRESS NOTES
[facility] Medicine  Progress Note    Patient Name: Marisol Kerr  MRN: [id number]  Patient Class: IP- Inpatient   Admission Date: 5/25/2023  Length of Stay: 4 days  Attending Physician: Vamshi Young MD  Primary Care Provider: Khadar Dwyer MD        Subjective:     Principal Problem:Shortness of breath    Interval History:  Patient is improved.  Awaiting PT OT evaluation.  Fortunately she refused PT today.  US venous with possible Arterial occlusion of the left SFA documented with left common femoral artery stenosis.  She is not complaining of left lower extremity pain at this time. Will get arterial us RLE.     Review of Systems  Objective:     Vital Signs (Most Recent):  Temp: 98.1 °F (36.7 °C) (05/29/23 1143)  Pulse: 64 (05/29/23 1348)  Resp: 18 (05/29/23 1348)  BP: (!) 142/58 (nurse mendel notified) (05/29/23 1143)  SpO2: 98 % (05/29/23 1348) Vital Signs (24h Range):  Temp:  [97.8 °F (36.6 °C)-98.4 °F (36.9 °C)] 98.1 °F (36.7 °C)  Pulse:  [56-90] 64  Resp:  [15-22] 18  SpO2:  [95 %-100 %] 98 %  BP: (140-178)/(58-77) 142/58     Weight: 68.8 kg (151 lb 10.8 oz)  Body mass index is 26.45 kg/m².    Intake/Output Summary (Last 24 hours) at 5/29/2023 1526  Last data filed at 5/29/2023 1158  Gross per 24 hour   Intake 560 ml   Output 600 ml   Net -40 ml        Physical Exam  Physical Exam     Nursing note and vitals reviewed.  Constitutional: She is not diaphoretic.  Confused and difficult to arouse.  On BiPAP.  HENT:   Head: Normocephalic and atraumatic.   Eyes: Conjunctivae are normal.   Neck:   Normal range of motion.  Cardiovascular:  Normal rate.           Pulmonary/Chest:   Poor air movement with diffuse inspiratory and expiratory rhonchi.  Positive JVD.   Abdominal: Abdomen is soft. There is no abdominal tenderness.   Musculoskeletal:         General: Normal range of motion.      Cervical back: Normal range of motion.      Neurological: She is alert. No sensory deficit.    Moves all extremities equally   Skin: No rash noted.   Psychiatric:   On initial examination patient has difficulty answer questions due to shortness of breath and respiratory distress.         Significant Labs: All pertinent labs within the past 24 hours have been reviewed.    Significant Imaging: I have reviewed all pertinent imaging results/findings within the past 24 hours.    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] 01/16/2019 Yes     Chronic    Chronic kidney disease, stage 4 (severe) [N18.4] 10/08/2014 Yes    DM type 2, controlled, with complication [E11.8] 06/03/2013 Yes    Essential hypertension, benign [I10] 04/09/2013 Yes      Problems Resolved During this Admission:     VTE Risk Mitigation (From admission, onward)           Ordered     heparin (porcine) injection 5,000 Units  Every 8 hours         05/28/23 1333     IP VTE HIGH RISK PATIENT  Once         05/25/23 1814     Place sequential compression device  Until discontinued         05/25/23 1814                                      Acute hypoxemic and hypercapnic respiratory failure, likely secondary to CHF, very severe COPD, NSTEMI  -okay for GMF with remote tele  -continue BiPAP at night  -hold Lasix for now, patient has diuresed well, some hypotension  - Pulmonary consult appreciated  -NTG drip, heparin drip x 48 hours  -empiric antibiotics for possible pneumonia    Prolonged QTC, improved  -monitor on tele, avoid QTC prolonging agents       History of multivessel CAD with stent  Abnormal EKG with ST  depressions  -She had left heart catheterization 2/2022 severe triple-vessel disease and had complication with rectus sheet hematoma/bleeding and transferred to [facility] for IR intervention and embolization.  She was deemed too high risk for CABG.  She states that she was told she was too high risk for PCI, they are not planning on doing an angiogram for high risk PCI, and she will be medically managed  - finished 48 hours heparin gtt  -c/w asa/brilinta    Possible Arterial occlusion of the left SFA documented with left common femoral artery stenosis  -get dedicated arterial us le       Metabolic encephalopathy, improving    CKD 3-monitor ins and outs, avoid nephrotoxic agents     [id number]      HSQ prophylaxis, ppi    Restart home medications as ordered  Consider palliative care  Expect home tomorrow pending PT OT evaluation, arterial ultrasound    Vamshi Young MD  Department of Hospital Medicine   [facility]

## 2023-05-29 NOTE — PROGRESS NOTES
[facility]  Adult Nutrition   Progress Note (Follow-Up)    SUMMARY      Recommendations:   1. Recommend Diabetic Cardiac be added to Pureed diet  2. Continue Glucerna with meals     Goals:   Goals: Advance diet within 3 days and improve po intake or provide nutrition support to meet EEN/EPN.Progressing    Dietitian Rounds Brief  Patient eating well per nursing. Added diabetic, cardiac to diet. Last BM 5/25/23. Plan is to dc soon. Possibly today. RD to continue to monitor for intake, labs, and weight PRN.    Diet order: Pureed    Oral Supplement: Glucerna with meals    % Intake of Estimated Energy Needs: 75 - 100 %  % Meal Intake: 75 - 100 %    Estimated/Assessed Needs  Weight Used For Calorie Calculations: 68.5 kg (151 lb 0.2 oz)  Energy Calorie Requirements (kcal): 4211-6166 kcal (25-30 kcal/ kg bw)  Energy Need Method: Kcal/kg  Protein Requirements: 68-82 g (1.0-1.2 kg/ kg bw)  Weight Used For Protein Calculations: 68.5 kg (151 lb 0.2 oz)     Estimated Fluid Requirement Method: RDA Method  RDA Method (mL): 1712       Weight History:  Wt Readings from Last 5 Encounters:   05/26/23 68.8 kg (151 lb 10.8 oz)   05/26/23 68.5 kg (151 lb)   04/16/23 73 kg (161 lb)   03/28/23 73 kg (161 lb)   01/30/23 81.4 kg (179 lb 7.3 oz)        Reason for Assessment  Reason For Assessment: consult  Relevant Medical History: SOB, CAD, COPD, CHF, Obesity  Interdisciplinary Rounds: did not attend    Medications:Pertinent Medications Reviewed  Scheduled Meds:   albuterol-ipratropium  3 mL Nebulization Q6H    allopurinoL  50 mg Oral Daily    aspirin  81 mg Oral Daily    atorvastatin  40 mg Oral Daily    balsam peru-castor oiL   Topical (Top) BID    cefUROXime  250 mg Oral Q12H    chlorhexidine  15 mL Mouth/Throat BID    diltiaZEM  120 mg Oral Daily    heparin (porcine)  5,000 Units Subcutaneous Q8H    hydrALAZINE  25 mg Oral Q12H    metoprolol tartrate  50 mg Oral BID    mupirocin   Nasal BID    pantoprazole  40 mg Oral  Daily    predniSONE  30 mg Oral Daily    ranolazine  500 mg Oral BID    ticagrelor  90 mg Oral Q12H     Continuous Infusions:  PRN Meds:.dextrose 50%, dextrose 50%, glucagon (human recombinant), glucagon (human recombinant), glucose, glucose, hydrALAZINE, lorazepam, metoclopramide HCl, midodrine, morphine, naloxone, potassium bicarbonate, potassium bicarbonate, potassium bicarbonate, sodium chloride 0.9%    Labs: Pertinent Labs Reviewed  Clinical Chemistry:  Recent Labs   Lab 05/26/23  0506 05/27/23  0447 05/29/23  0649      < > 142   K 4.5   < > 4.3   CL 96   < > 103   CO2 29   < > 33*   *   < > 111*   BUN 29*   < > 57*   CREATININE 1.9*   < > 1.8*   CALCIUM 8.5*   < > 8.7   PROT 6.4   < > 5.6*   ALBUMIN 2.9*   < > 2.7*   BILITOT 0.9   < > 0.6   ALKPHOS 83   < > 70   AST 20   < > 34   ALT 14   < > 24   ANIONGAP 12   < > 6*   MG 1.9  --   --     < > = values in this interval not displayed.     CBC:   Recent Labs   Lab 05/29/23  0649   WBC 9.37   RBC 3.80*   HGB 10.3*   HCT 34.5*      MCV 91   MCH 27.1   MCHC 29.9*     Lipid Panel:  No results for input(s): CHOL, HDL, LDLCALC, TRIG, CHOLHDL in the last 168 hours.  Cardiac Profile:  Recent Labs   Lab 05/25/23  1603   *     Inflammatory Labs:  No results for input(s): CRP in the last 168 hours.  Diabetes:  No results for input(s): HGBA1C, POCTGLUCOSE in the last 168 hours.  Thyroid & Parathyroid:  No results for input(s): TSH, FREET4, H5NQVLB, X4UXQTO, THYROIDAB in the last 168 hours.    Monitor and Evaluation  Food and Nutrient Intake: energy intake, food and beverage intake  Food and Nutrient Adminstration: diet order     Nutrition Risk  Level of Risk/Frequency of Follow-up: moderate - high     Nutrition Follow-Up  RD Follow-up?: Yes    Chanell Estrada RD 05/29/2023 11:32 AM

## 2023-05-30 LAB
ALBUMIN SERPL BCP-MCNC: 2.7 G/DL (ref 3.5–5.2)
ALP SERPL-CCNC: 65 U/L (ref 55–135)
ALT SERPL W/O P-5'-P-CCNC: 22 U/L (ref 10–44)
ANION GAP SERPL CALC-SCNC: 4 MMOL/L (ref 8–16)
AST SERPL-CCNC: 25 U/L (ref 10–40)
BASOPHILS # BLD AUTO: 0.01 K/UL (ref 0–0.2)
BASOPHILS NFR BLD: 0.1 % (ref 0–1.9)
BILIRUB SERPL-MCNC: 0.7 MG/DL (ref 0.1–1)
BUN SERPL-MCNC: 57 MG/DL (ref 8–23)
CALCIUM SERPL-MCNC: 8.4 MG/DL (ref 8.7–10.5)
CHLORIDE SERPL-SCNC: 104 MMOL/L (ref 95–110)
CO2 SERPL-SCNC: 33 MMOL/L (ref 23–29)
CREAT SERPL-MCNC: 1.6 MG/DL (ref 0.5–1.4)
DIFFERENTIAL METHOD: ABNORMAL
EOSINOPHIL # BLD AUTO: 0 K/UL (ref 0–0.5)
EOSINOPHIL NFR BLD: 0 % (ref 0–8)
ERYTHROCYTE [DISTWIDTH] IN BLOOD BY AUTOMATED COUNT: 16.5 % (ref 11.5–14.5)
EST. GFR  (NO RACE VARIABLE): 33.4 ML/MIN/1.73 M^2
GLUCOSE SERPL-MCNC: 123 MG/DL (ref 70–110)
GLUCOSE SERPL-MCNC: 148 MG/DL (ref 70–110)
GLUCOSE SERPL-MCNC: 89 MG/DL (ref 70–110)
GLUCOSE SERPL-MCNC: 92 MG/DL (ref 70–110)
GLUCOSE SERPL-MCNC: 93 MG/DL (ref 70–110)
HCT VFR BLD AUTO: 33.8 % (ref 37–48.5)
HGB BLD-MCNC: 10.1 G/DL (ref 12–16)
IMM GRANULOCYTES # BLD AUTO: 0.06 K/UL (ref 0–0.04)
IMM GRANULOCYTES NFR BLD AUTO: 0.7 % (ref 0–0.5)
LYMPHOCYTES # BLD AUTO: 1.1 K/UL (ref 1–4.8)
LYMPHOCYTES NFR BLD: 13.5 % (ref 18–48)
MCH RBC QN AUTO: 26.9 PG (ref 27–31)
MCHC RBC AUTO-ENTMCNC: 29.9 G/DL (ref 32–36)
MCV RBC AUTO: 90 FL (ref 82–98)
MONOCYTES # BLD AUTO: 0.7 K/UL (ref 0.3–1)
MONOCYTES NFR BLD: 7.8 % (ref 4–15)
NEUTROPHILS # BLD AUTO: 6.5 K/UL (ref 1.8–7.7)
NEUTROPHILS NFR BLD: 77.9 % (ref 38–73)
NRBC BLD-RTO: 0 /100 WBC
PLATELET # BLD AUTO: 202 K/UL (ref 150–450)
PMV BLD AUTO: 12.5 FL (ref 9.2–12.9)
POTASSIUM SERPL-SCNC: 4.3 MMOL/L (ref 3.5–5.1)
PROT SERPL-MCNC: 5.5 G/DL (ref 6–8.4)
RBC # BLD AUTO: 3.76 M/UL (ref 4–5.4)
SODIUM SERPL-SCNC: 141 MMOL/L (ref 136–145)
WBC # BLD AUTO: 8.34 K/UL (ref 3.9–12.7)

## 2023-05-30 PROCEDURE — 63600175 PHARM REV CODE 636 W HCPCS: Performed by: INTERNAL MEDICINE

## 2023-05-30 PROCEDURE — 94761 N-INVAS EAR/PLS OXIMETRY MLT: CPT

## 2023-05-30 PROCEDURE — 12000002 HC ACUTE/MED SURGE SEMI-PRIVATE ROOM

## 2023-05-30 PROCEDURE — 36415 COLL VENOUS BLD VENIPUNCTURE: CPT | Performed by: INTERNAL MEDICINE

## 2023-05-30 PROCEDURE — 97116 GAIT TRAINING THERAPY: CPT

## 2023-05-30 PROCEDURE — 80053 COMPREHEN METABOLIC PANEL: CPT | Performed by: INTERNAL MEDICINE

## 2023-05-30 PROCEDURE — 27000221 HC OXYGEN, UP TO 24 HOURS

## 2023-05-30 PROCEDURE — 97535 SELF CARE MNGMENT TRAINING: CPT

## 2023-05-30 PROCEDURE — 25000003 PHARM REV CODE 250: Performed by: INTERNAL MEDICINE

## 2023-05-30 PROCEDURE — 25000242 PHARM REV CODE 250 ALT 637 W/ HCPCS: Performed by: INTERNAL MEDICINE

## 2023-05-30 PROCEDURE — 85025 COMPLETE CBC W/AUTO DIFF WBC: CPT | Performed by: INTERNAL MEDICINE

## 2023-05-30 PROCEDURE — 99232 PR SUBSEQUENT HOSPITAL CARE,LEVL II: ICD-10-PCS | Mod: ,,, | Performed by: INTERNAL MEDICINE

## 2023-05-30 PROCEDURE — 99900035 HC TECH TIME PER 15 MIN (STAT)

## 2023-05-30 PROCEDURE — 25000003 PHARM REV CODE 250

## 2023-05-30 PROCEDURE — 99900031 HC PATIENT EDUCATION (STAT)

## 2023-05-30 PROCEDURE — 97166 OT EVAL MOD COMPLEX 45 MIN: CPT

## 2023-05-30 PROCEDURE — 94640 AIRWAY INHALATION TREATMENT: CPT

## 2023-05-30 PROCEDURE — 97162 PT EVAL MOD COMPLEX 30 MIN: CPT

## 2023-05-30 PROCEDURE — 99232 SBSQ HOSP IP/OBS MODERATE 35: CPT | Mod: ,,, | Performed by: INTERNAL MEDICINE

## 2023-05-30 PROCEDURE — 94660 CPAP INITIATION&MGMT: CPT

## 2023-05-30 RX ORDER — PREDNISONE 20 MG/1
20 TABLET ORAL DAILY
Status: DISCONTINUED | OUTPATIENT
Start: 2023-05-31 | End: 2023-06-01 | Stop reason: HOSPADM

## 2023-05-30 RX ORDER — GLUCAGON 1 MG
1 KIT INJECTION
Status: DISCONTINUED | OUTPATIENT
Start: 2023-05-30 | End: 2023-05-30 | Stop reason: SDUPTHER

## 2023-05-30 RX ORDER — INSULIN ASPART 100 [IU]/ML
0-8 INJECTION, SOLUTION INTRAVENOUS; SUBCUTANEOUS
Status: DISPENSED | OUTPATIENT
Start: 2023-05-30 | End: 2023-05-30

## 2023-05-30 RX ORDER — IPRATROPIUM BROMIDE AND ALBUTEROL SULFATE 2.5; .5 MG/3ML; MG/3ML
3 SOLUTION RESPIRATORY (INHALATION)
Status: DISCONTINUED | OUTPATIENT
Start: 2023-05-30 | End: 2023-06-01 | Stop reason: HOSPADM

## 2023-05-30 RX ORDER — POLYETHYLENE GLYCOL 3350 17 G/17G
17 POWDER, FOR SOLUTION ORAL DAILY
Status: DISCONTINUED | OUTPATIENT
Start: 2023-05-30 | End: 2023-06-01 | Stop reason: HOSPADM

## 2023-05-30 RX ADMIN — ATORVASTATIN CALCIUM 40 MG: 40 TABLET, FILM COATED ORAL at 08:05

## 2023-05-30 RX ADMIN — DILTIAZEM HYDROCHLORIDE 120 MG: 120 CAPSULE, COATED, EXTENDED RELEASE ORAL at 08:05

## 2023-05-30 RX ADMIN — METOPROLOL TARTRATE 50 MG: 50 TABLET, FILM COATED ORAL at 08:05

## 2023-05-30 RX ADMIN — MUPIROCIN 1 G: 20 OINTMENT TOPICAL at 08:05

## 2023-05-30 RX ADMIN — PREDNISONE 30 MG: 20 TABLET ORAL at 08:05

## 2023-05-30 RX ADMIN — HYDRALAZINE HYDROCHLORIDE 25 MG: 25 TABLET ORAL at 08:05

## 2023-05-30 RX ADMIN — IPRATROPIUM BROMIDE AND ALBUTEROL SULFATE 3 ML: .5; 3 SOLUTION RESPIRATORY (INHALATION) at 09:05

## 2023-05-30 RX ADMIN — ALLOPURINOL 50 MG: 100 TABLET ORAL at 12:05

## 2023-05-30 RX ADMIN — CHLORHEXIDINE GLUCONATE 15 ML: 1.2 RINSE ORAL at 08:05

## 2023-05-30 RX ADMIN — POLYETHYLENE GLYCOL 3350 17 G: 17 POWDER, FOR SOLUTION ORAL at 08:05

## 2023-05-30 RX ADMIN — TICAGRELOR 90 MG: 90 TABLET ORAL at 09:05

## 2023-05-30 RX ADMIN — IPRATROPIUM BROMIDE AND ALBUTEROL SULFATE 3 ML: .5; 3 SOLUTION RESPIRATORY (INHALATION) at 01:05

## 2023-05-30 RX ADMIN — PANTOPRAZOLE SODIUM 40 MG: 40 TABLET, DELAYED RELEASE ORAL at 05:05

## 2023-05-30 RX ADMIN — CEFUROXIME AXETIL 250 MG: 250 TABLET, FILM COATED ORAL at 08:05

## 2023-05-30 RX ADMIN — CASTOR OIL AND BALSAM, PERU: 788; 87 OINTMENT TOPICAL at 08:05

## 2023-05-30 RX ADMIN — IPRATROPIUM BROMIDE AND ALBUTEROL SULFATE 3 ML: .5; 3 SOLUTION RESPIRATORY (INHALATION) at 07:05

## 2023-05-30 RX ADMIN — RANOLAZINE 500 MG: 500 TABLET, EXTENDED RELEASE ORAL at 08:05

## 2023-05-30 RX ADMIN — INSULIN ASPART 0 UNITS: 100 INJECTION, SOLUTION INTRAVENOUS; SUBCUTANEOUS at 08:05

## 2023-05-30 RX ADMIN — HEPARIN SODIUM 5000 UNITS: 5000 INJECTION, SOLUTION INTRAVENOUS; SUBCUTANEOUS at 03:05

## 2023-05-30 RX ADMIN — TICAGRELOR 90 MG: 90 TABLET ORAL at 08:05

## 2023-05-30 RX ADMIN — HEPARIN SODIUM 5000 UNITS: 5000 INJECTION, SOLUTION INTRAVENOUS; SUBCUTANEOUS at 09:05

## 2023-05-30 RX ADMIN — HEPARIN SODIUM 5000 UNITS: 5000 INJECTION, SOLUTION INTRAVENOUS; SUBCUTANEOUS at 05:05

## 2023-05-30 RX ADMIN — ASPIRIN 81 MG CHEWABLE TABLET 81 MG: 81 TABLET CHEWABLE at 08:05

## 2023-05-30 NOTE — PROGRESS NOTES
"      [facility]  Progress Note    Patient Name: Marisol Kerr  MRN: [id number]  Patient Class: IP- Inpatient   Admission Date: 5/25/2023  Length of Stay: 5 days  Attending Physician: Vamshi Young MD  Primary Care Provider: Khadar Dwyer MD        Subjective:     Principal Problem:Shortness of breath    Interval History:  Patient is improved.  CM working on placement to SNF, "awaiting 142." US LE with Significant vascular disease within the bilateral lower extremities with stenosis between the common femoral and proximal superficial femoral arteries. Will get vascular input.     Review of Systems  Objective:     Vital Signs (Most Recent):  Temp: 97.8 °F (36.6 °C) (05/30/23 1527)  Pulse: 65 (05/30/23 1527)  Resp: 19 (05/30/23 1527)  BP: (!) 151/72 (05/30/23 1527)  SpO2: 96 % (05/30/23 1527) Vital Signs (24h Range):  Temp:  [97 °F (36.1 °C)-98.3 °F (36.8 °C)] 97.8 °F (36.6 °C)  Pulse:  [63-82] 65  Resp:  [17-19] 19  SpO2:  [96 %-100 %] 96 %  BP: (129-159)/(52-72) 151/72     Weight: 68.8 kg (151 lb 10.8 oz)  Body mass index is 26.45 kg/m².    Intake/Output Summary (Last 24 hours) at 5/30/2023 1727  Last data filed at 5/30/2023 1704  Gross per 24 hour   Intake 440 ml   Output 1050 ml   Net -610 ml        Physical Exam  Physical Exam     Nursing note and vitals reviewed.  Constitutional: She is not diaphoretic.  Confused and difficult to arouse.  On BiPAP.  HENT:   Head: Normocephalic and atraumatic.   Eyes: Conjunctivae are normal.   Neck:   Normal range of motion.  Cardiovascular:  Normal rate.           Pulmonary/Chest:   Poor air movement with diffuse inspiratory and expiratory rhonchi.  Positive JVD.   Abdominal: Abdomen is soft. There is no abdominal tenderness.   Musculoskeletal:         General: Normal range of motion.      Cervical back: Normal range of motion.      Neurological: She is alert. No sensory deficit.   Moves all extremities equally   Skin: No rash noted. "   Psychiatric:   On initial examination patient has difficulty answer questions due to shortness of breath and respiratory distress.         Significant Labs: All pertinent labs within the past 24 hours have been reviewed.    Significant Imaging: I have reviewed all pertinent imaging results/findings within the past 24 hours.    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] 01/16/2019 Yes     Chronic    Chronic kidney disease, stage 4 (severe) [N18.4] 10/08/2014 Yes    DM type 2, controlled, with complication [E11.8] 06/03/2013 Yes    Essential hypertension, benign [I10] 04/09/2013 Yes      Problems Resolved During this Admission:     VTE Risk Mitigation (From admission, onward)           Ordered     heparin (porcine) injection 5,000 Units  Every 8 hours         05/28/23 1333     IP VTE HIGH RISK PATIENT  Once         05/25/23 1814     Place sequential compression device  Until discontinued         05/25/23 1814                                      Acute hypoxemic and hypercapnic respiratory failure, likely secondary to CHF, very severe COPD, NSTEMI  -improved, baseline, pending SNF placement  -continue BiPAP at night  -hold Lasix for now, patient has diuresed well, some hypotension.  Consider restarting if hypertensive  - Pulmonary consult appreciated  -NTG drip, heparin drip x 48 hours  -empiric antibiotics for possible pneumonia    Prolonged QTC, improved  -monitor on tele, avoid QTC prolonging agents       History of multivessel CAD with stent  Abnormal EKG with ST  depressions  -She had left heart catheterization 2/2022 severe triple-vessel disease and had complication with rectus sheet hematoma/bleeding and [place] [facility] [place].  She was deemed too high risk for CABG.  She states that she was told she was too high risk for PCI, they are not planning on doing an angiogram for high risk PCI, and she will be medically managed  - finished 48 hours heparin gtt  -c/w PTA asa/brilinta    Significant vascular disease within the bilateral lower extremities with stenosis between the common femoral and proximal superficial femoral arteries. Will get vascular input.   -get vascular input       Metabolic encephalopathy, improving    CKD 3-monitor ins and outs, avoid nephrotoxic agents     [id number]      HSQ prophylaxis, ppi    Restart home medications as ordered    Dispo:  DC pending sniff placement, would like vascular evaluation but also can wait until outpatient    Vamshi Young MD  Department of Hospital Medicine   [facility]     Safety plan discussed with.../Education provided regarding environmental safety / lethal means restriction/Provision of National Suicide Prevention Lifeline 0-692-821-HZAX (0661)

## 2023-05-30 NOTE — PLAN OF CARE
05/30/23 1401   Post-Acute Status   Post-Acute Authorization Placement   Post-Acute Placement Status Referrals Sent   Discharge Delays (!) Post-Acute Set-up   Discharge Plan   Discharge Plan A Skilled Nursing Facility   Discharge Plan B Skilled Nursing Facility     PASRR completed, faxed to office of aging, and scanned into .    LOCET called in.    Awaiting 142.    Lauren sent SW a secure chat informing that she will review.

## 2023-05-30 NOTE — PLAN OF CARE
Goals to be met by: 23     Patient will increase functional independence with mobility by performin. Supine to sit with Supervision  2. Sit to stand transfer with Supervision  3. Bed to chair transfer with Supervision using Rolling Walker  4. Gait  x 150 feet with Supervision using Rolling Walker.

## 2023-05-30 NOTE — PT/OT/SLP EVAL
Occupational Therapy   Evaluation    Name: Marisol Kerr  MRN: [id number]  Admitting Diagnosis: Shortness of breath  Recent Surgery: * No surgery found *      Recommendations:     Discharge Recommendations: nursing facility, skilled  Discharge Equipment Recommendations:  none  Barriers to discharge:  Decreased caregiver support    Assessment:     Marisol Kerr is a 77 y.o. female with a medical diagnosis of Shortness of breath.  Patient is extremely [place] and has some visual deficits as well. Patient was able to hear therapist when words were spoken directly into patient's R ear. Noted minimal to moderate labored breathing especially with activity. Patient able to transfer bed<>[facility] requiring Min A with HHA. Patient encouraged to attempt pablito-hygiene after after voiding in BS. Patient was able to perform pablito-hygiene after given encouragement and instruction for safety.  Performance deficits affecting function: weakness, impaired endurance, impaired self care skills, impaired functional mobility, gait instability, impaired balance, decreased coordination, impaired cardiopulmonary response to activity.      Rehab Prognosis: Fair; patient would benefit from acute skilled OT services to address these deficits and reach maximum level of function.       Plan:     Patient to be seen 5 x/week to address the above listed problems via self-care/home management, therapeutic activities, therapeutic exercises  Plan of Care Expires: 06/27/23  Plan of Care Reviewed with: patient    Subjective     Chief Complaint: none  Patient/Family Comments/goals: none    Occupational Profile:  Living Environment: Patient lives alone in a [facility].   Previous level of function: Patient was Mod I with ADLs and ambulatory using RW and cane.  Equipment Used at Home: walker, rolling, rollator, cane, straight, oxygen  Assistance upon Discharge: Patient will have very limited assistance from daughter who lives in [place], [place].     Pain/Comfort:  Pain Rating 1:  0/10  Pain Rating Post-Intervention 1: 0/10    Patients cultural, spiritual, Lutheran conflicts given the current situation: no    Objective:     Communicated with: nurse Galvez prior to session.  Patient found HOB elevated with bed alarm, oxygen, PureWick, peripheral IV, telemetry upon OT entry to room.    General Precautions: Standard, fall, vision impaired, hearing impaired  Orthopedic Precautions: N/A  Braces: N/A  Respiratory Status: Nasal cannula, flow 4 L/min    Occupational Performance:    Bed Mobility:    Patient completed Scooting/Bridging with moderate assistance  Patient completed Supine to Sit with moderate assistance with trunk support  Patient completed Sit to Supine with minimum assistance to BLE into bed    Functional Mobility/Transfers:  Patient completed Bed <> Chair Transfer using Stand Pivot technique with minimum assistance with hand-held assist  Functional Mobility: Fair static sitting balance; Fair(-) standing balance.     Activities of Daily Living:  Lower Body Dressing: maximal assistance to don socks while supine in bed  Toileting: moderate assistance with BM hygiene while seated on BSC. Patient was able to wipe groin area w/o assist with hygiene wipe after voiding on BSC. However, patient could not reach around to wipe buttocks requiring  Mod A from therapist.    Cognitive/Visual Perceptual:  Cognitive/Psychosocial Skills:     -       Oriented to: x4   -       Follows Commands/attention:Follows two-step commands  -       Communication: clear/fluent and but extremely [place]  -       Safety awareness/insight to disability: impaired   -       Mood/Affect/Coping skills/emotional control: Appropriate to situation, Cooperative, and Pleasant  Visual/Perceptual:      -Impaired  due to decreased visual acuity    Physical Exam:  Postural examination/scapula alignment:    -       Rounded shoulders  -       Forward head  Upper Extremity Range of Motion:     -       Right Upper Extremity: WFL  -        Left Upper Extremity: WFL  Upper Extremity Strength:    -       Right Upper Extremity: WFL  -       Left Upper Extremity: WFL   Strength:    -       Right Upper Extremity: WFL  -       Left Upper Extremity: WFL  Fine Motor Coordination:    -       Intact  Gross motor coordination:   WFL in BUE    AMPA 6 Click ADL:  AMPAC Total Score: 16    Treatment & Education:  OT ed pt on OT role & POC as well as discharge recommendations.  OT ed pt on use of pursed lip breathing & activity pacing  to prevent SOB & fatigue with activity.  OT educated patient on energy conservation techniques to reduce demand on cardiovascular system with performance of ADL tasks.       Patient left HOB elevated with all lines intact, call button in reach, bed alarm on, and nurse notified    GOALS:   Multidisciplinary Problems       Occupational Therapy Goals          Problem: Occupational Therapy    Goal Priority Disciplines Outcome Interventions   Occupational Therapy Goal     OT, PT/OT Ongoing, Progressing    Description: Goals to be met by: 6/27/2023     Patient will increase functional independence with ADLs by performing:    LE Dressing with Supervision and Assistive Devices as needed.  Grooming while standing at sink with Supervision.  Toileting from toilet with Supervision for hygiene and clothing management.   Supine to sit with Supervision.  Toilet transfer to toilet with Supervision.                         History:     Past Medical History:   Diagnosis Date    CAD (coronary artery disease)     Cancer     colon    CHF (congestive heart failure)     Colitis     Colon cancer     COPD (chronic obstructive pulmonary disease)     Decreased hearing     Diabetes mellitus     Diabetes mellitus, type 2     Hypercholesterolemia     Hypertension     Hypoxemia     Insomnia     Obesity     Osteoarthritis          Past Surgical History:   Procedure Laterality Date    ANGIOGRAM, CORONARY, WITH LEFT HEART CATHETERIZATION N/A 2/10/2022     Procedure: Angiogram, Coronary, with Left Heart Cath;  Surgeon: Cristian Benjamin MD;  Location: [facility] CATH/EP LAB;  Service: Cardiology;  Laterality: N/A;    CARDIAC CATHETERIZATION      CHOLECYSTECTOMY      COLECTOMY      CORONARY ANGIOPLASTY      CORONARY STENT PLACEMENT      ERCP N/A 1/16/2023    Procedure: ERCP (ENDOSCOPIC RETROGRADE CHOLANGIOPANCREATOGRAPHY);  Surgeon: Biju Kramer III, MD;  Location: [facility] ENDO;  Service: Endoscopy;  Laterality: N/A;    ESOPHAGOGASTRODUODENOSCOPY N/A 1/29/2023    Procedure: EGD (ESOPHAGOGASTRODUODENOSCOPY);  Surgeon: Aarti Alvarez MD;  Location: [facility] ENDO;  Service: Endoscopy;  Laterality: N/A;    EXTERNAL EAR SURGERY      EYE SURGERY      FLEXIBLE SIGMOIDOSCOPY N/A 9/19/2019    Procedure: SIGMOIDOSCOPY, FLEXIBLE;  Surgeon: Biju Kramer III, MD;  Location: [facility];  Service: Endoscopy;  Laterality: N/A;    HYSTERECTOMY      partial       Time Tracking:     OT Date of Treatment: 05/30/23  OT Start Time: 1000  OT Stop Time: 1030  OT Total Time (min): 30 min    Billable Minutes:Evaluation 6  Self Care/Home Management 24    5/30/2023

## 2023-05-30 NOTE — CARE UPDATE
05/29/23 2004   Patient Assessment/Suction   Level of Consciousness (AVPU) alert   Respiratory Effort Normal   Expansion/Accessory Muscles/Retractions no use of accessory muscles   All Lung Fields Breath Sounds diminished   Rhythm/Pattern, Respiratory unlabored   Cough Frequency no cough   Skin Integrity   $ Wound Care Tech Time 15 min   Area Observed Bridge of nose   Skin Appearance redness blanchable   Barrier used? Gel Cushion   PRE-TX-O2   Device (Oxygen Therapy) nasal cannula   $ Is the patient on Low Flow Oxygen? Yes   Flow (L/min) 3   SpO2 100 %   Pulse Oximetry Type Intermittent   $ Pulse Oximetry - Multiple Charge Pulse Oximetry - Multiple   Pulse 74   Resp 19   Aerosol Therapy   $ Aerosol Therapy Charges Aerosol Treatment   Daily Review of Necessity (SVN) completed   Respiratory Treatment Status (SVN) given   Treatment Route (SVN) mask   Patient Position (SVN) semi-Solorzano's   Post Treatment Assessment (SVN) breath sounds unchanged;vital signs unchanged   Signs of Intolerance (SVN) none   Preset CPAP/BiPAP Settings   Mode Of Delivery BiPAP;Standby   Reason patient is not wearing? Patient refused   Education   $ Education Bronchodilator;15 min   Respiratory Evaluation   $ Care Plan Tech Time 15 min

## 2023-05-30 NOTE — PROGRESS NOTES
"Pulmonary/Critical Care  Progress Note      Patient name: Marisol Kerr  MRN: [id number]  Date: 05/30/2023    Admit Date: 5/25/2023  Consult Requested By: Vamshi Young MD    Reason for Consult: AECOPD, respiratory failure    HPI:    5/26/2023 - 78 yo ho COPD (severe, home O2), ASCVD, diastolic heart failure, CKD3, obesity with several admissions this year came to ER with increased SOB, worsened saturations.  Was admitted and placed on BiPAP.  Overnight she has done OK and this AM we tried to take her off BiPAP and after a short while she developed increased SOB, felt "like I'm going to die", she was placed back on BiPAP, got some morphine and was started on precedex due to anxiety.  I came back to revisit her and she indicates that if she worsens she would like to be intubated.  ROS is difficult due to hearing.  CT chest reviewed.  EKG noted has elevated QTc    5/27/2023 - Looks and feels better this AM, off BiPAP and breathing is much more comfortable.  No new complaints.  She does not show much insight into her condition.  Creatinine has increased    5/28/2023 - Stable overnight, no new issues reported, she feels better overall.    5/29/2023 - Stable overnight, no new issues reported.  Feels better and is asking about going home.    5/30 the patient looks fine.  She was confused about code status.  She acknowledges that she is supposed to be a DNR.  Will place that order.    Review of System  General: Feeling better  Eyes: Vision is good.  ENT:  No sinusitis or pharyngitis.   Heart: No chest pain or palpitations.  Lungs:  She gets very short of breath when she is anxious or frustrated.  GI: No Nausea, vomiting, constipation, diarrhea, or reflux.  : No dysuria, hesitancy, or nocturia.  Skin: No lesions or rashes.  Musculoskeletal: No joint pain or myalgias.  Neuro: No headaches or neuropathy.  Lymph: No edema or adenopathy.  Psych: No anxiety or depression.  Endo: No weight change.    No change in the " "patient's Past Medical History, Past Surgical History, Social History or Family History since admission.      Physical Exam    Vital signs:  Temp:  [97 °F (36.1 °C)-98.3 °F (36.8 °C)]   Pulse:  [63-82]   Resp:  [17-19]   BP: (129-159)/(52-68)   SpO2:  [96 %-100 %]     Intake/Output:   Intake/Output Summary (Last 24 hours) at 5/30/2023 1502  Last data filed at 5/30/2023 1352  Gross per 24 hour   Intake 440 ml   Output 750 ml   Net -310 ml          BMI: Estimated body mass index is 26.45 kg/m² as calculated from the following:    Height as of this encounter: 5' 3.5" (1.613 m).    Weight as of this encounter: 68.8 kg (151 lb 10.8 oz).    PHYSICAL EXAM  GENERAL: Older patient in no distress.  HEENT: Pupils equal and reactive. Extraocular movements intact. Nose intact. Pharynx moist.  NECK: Supple.   HEART: Regular rate and rhythm. No murmur or gallop auscultated.  LUNGS:  There is the faintest wheeze in the right posterior mid thorax.. Lung excursion symmetrical. No change in fremitus.   ABDOMEN: Bowel sounds present. Non-tender, no masses palpated.  : Normal anatomy.  EXTREMITIES: Normal muscle tone and joint movement, no cyanosis or clubbing.   LYMPHATICS: No adenopathy palpated, no edema.  SKIN: Dry, many ecchymoses  NEURO: Cranial nerves II-XII intact. Motor strength 5/5 bilaterally, upper and lower extremities.  PSYCH: Appropriate affect.  Mildly anxious        Laboratory    Recent Labs   Lab 05/30/23  0452   WBC 8.34   RBC 3.76*   HGB 10.1*   HCT 33.8*      MCV 90   MCH 26.9*   MCHC 29.9*         Recent Labs   Lab 05/30/23  0452   CALCIUM 8.4*   PROT 5.5*      K 4.3   CO2 33*      BUN 57*   CREATININE 1.6*   ALKPHOS 65   ALT 22   AST 25   BILITOT 0.7           Microbiology:       Microbiology Results (last 7 days)       ** No results found for the last 168 hours. **            Radiology    US Lower Extremity Arteries Bilateral  Ultrasound-lower extremity arterial    CLINICAL HISTORY: Arterial " occlusion.    COMPARISON: None    FINDINGS: Grayscale, color Doppler, and duplex ultrasound of the arterial vasculature within the bilateral lower extremities.    Monophasic waveforms within the common femoral through the dorsalis pedis arteries bilaterally. There is a significant stenosis between the common femoral and proximal superficial femoral arteries bilaterally.    Calcified and noncalcified atherosclerotic plaques within the arterial vasculature.    Ankle brachial indices were not performed.    IMPRESSION: Significant vascular disease within the bilateral lower extremities with stenosis between the common femoral and proximal superficial femoral arteries. Correlate with ankle brachial indices.    Electronically signed by:  Nitin Bradford MD  5/29/2023 5:54 PM CDT Workstation: [id number]        Oxygen Information  3.5 L, this is what she wears at home as well                No results for input(s): PH, PCO2, PO2, HCO3, POCSATURATED, BE in the last 72 hours.        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       Plan    Patient appears to be ready for discharge   Patient has not been willing to wear BiPAP ever in the past   Patient acknowledges that she is supposed to be a DNR   Patient should follow up in the office, she has not been there in years

## 2023-05-30 NOTE — PLAN OF CARE
"   05/30/23 1214   Post-Acute Status   Post-Acute Authorization Placement   Post-Acute Placement Status Patient List Provided   Hospital Resources/Appts/Education Provided Community resources provided   Discharge Delays (!) Post-Acute Set-up   Discharge Plan   Discharge Plan A Skilled Nursing Facility   Discharge Plan B Skilled Nursing Facility       Rae PT informed SW and RN that therapy is recommending SNF, however patient would like to go back to [facility].    11:36am  SW met with patient at bedside, daughter also in room. Patient did not have a hearing aid at the time and informed SW to talk with her daughter. SW explained therapy recommending SNF, daughter Marisol Kerr Jr stated she would like patient to return to [facility]. SW explained that [facility] can review pt , however the recommendation is SNF. SW explained briefly the difference between SNF and inpatient rehab. Marisol Dao (daughter) stated she will select 3 snf to review mother. She mentioned that she wanted her to [facility] [place] because it is close to her home (daughter). ADRIANA provided daughter with [facility] SNF facilities form.    Daughter chose [facility], [facility], and [facility]. SW sent "DP 3 days" to facilities via [place].     Patient choice form signed and scanned into [employer].    ADRIANA noticed there were no therapy notes in chart for today, Rae stated she will place eval in soon.    PASRR and Locet not completed at this time due to no PT/OT notes. SW will continue to follow for notes.    12:00pm  Santa with [facility] called ADRIANA stating she ran into RN , who informed her that patient would like to return. Santa asked ADRIANA once therapy notes are placed in chart, for pt to be sent to [facility] for review. Notes are not in at this time.    "

## 2023-05-30 NOTE — PT/OT/SLP EVAL
Physical Therapy Evaluation    Patient Name:  Marisol Kerr   MRN:  [id number]    Recommendations:     Discharge Recommendations: nursing facility, skilled   Discharge Equipment Recommendations: none   Barriers to discharge: Decreased caregiver support    Assessment:     Marisol Kerr is a 77 y.o. female admitted with a medical diagnosis of Shortness of breath.  She presents with the following impairments/functional limitations: weakness, impaired endurance, impaired self care skills, impaired functional mobility, gait instability, impaired balance, decreased safety awareness, impaired cardiopulmonary response to activity.    Pt found HOB elevated and agreeable to working with PT. Pt A & O x  4 and has the following co-morbidities: DM, CKD, NSTEMI, [place] w/ hearing aids.  Pt tolerated session fairly with difficulty communicating due to hearing aid not working and pt having to talk directly into pt's R ear and required moderate to minimal A for safe mobilization during session today. Pt would benefit from acute PT during hospitalization to increase strength, endurance and safety with mobility and would benefit from SNF prior to discharge home.      Rehab Prognosis: Fair; patient would benefit from acute skilled PT services to address these deficits and reach maximum level of function.    Recent Surgery: * No surgery found *      Plan:     During this hospitalization, patient to be seen 6 x/week to address the identified rehab impairments via gait training, therapeutic activities, therapeutic exercises and progress toward the following goals:    Plan of Care Expires:  06/27/23    Subjective     Chief Complaint: difficulty breathing with movement & pt reported her feet are painful  Patient/Family Comments/goals: SNF  Pain/Comfort:  Pain Rating 1:  (not rated)  Location - Side 1: Bilateral  Location 1: foot  Pain Addressed 1: Reposition, Distraction, Cessation of Activity    Patients cultural, spiritual, Yazidi conflicts  given the current situation:      Living Environment:  Pt lives alone in a H w/ no steps to enter.  Prior to admission, patients level of function was modified independent with household amb and ADLs using RW.  Equipment used at home: cane, straight, walker, rolling, oxygen.  DME owned (not currently used): none.  Upon discharge, patient will have assistance from facility staff.    Objective:     Communicated with OFELIA Galvez prior to session.  Patient found HOB elevated with bed alarm, oxygen, PureWick, peripheral IV, telemetry  upon PT entry to room.    General Precautions: Standard, fall, hearing impaired, vision impaired  Orthopedic Precautions:N/A   Braces: N/A  Respiratory Status: Nasal cannula, flow 4 L/min    Exams:  Cognitive Exam:  Patient is oriented to Person, Place, Time, and Situation  RLE ROM: WFL  RLE Strength: grossly 4/5  LLE ROM: WFL  LLE Strength: grossly 4/5    Functional Mobility:  Bed Mobility:     Scooting: minimum assistance  Supine to Sit: minimum assistance, moderate assistance, and vc for technique  Sit to Supine: minimum assistance  Transfers:     Sit to Stand:  minimum assistance with rolling walker  Gait: x 40 feet with RW and minimal A for balance and vc for RW safety, on 4Lpm with SPO2 81% at end of gait trial.      AM-PAC 6 CLICK MOBILITY  Total Score:16       Treatment & Education:  Pt was educated on the following: call light use, importance of OOB activity and functional mobility to negate the negative effects of prolonged bed rest during this hospitalization, safe transfers/ambulation and discharge planning recommendations/options.      Patient left HOB elevated with all lines intact, call button in reach, bed alarm on, and RN notified.    GOALS:   Multidisciplinary Problems       Physical Therapy Goals          Problem: Physical Therapy    Goal Priority Disciplines Outcome Goal Variances Interventions   Physical Therapy Goal     PT, PT/OT      Description: Goals to be met by:  23     Patient will increase functional independence with mobility by performin. Supine to sit with Supervision  2. Sit to stand transfer with Supervision  3. Bed to chair transfer with Supervision using Rolling Walker  4. Gait  x 150 feet with Supervision using Rolling Walker.                              History:     Past Medical History:   Diagnosis Date    CAD (coronary artery disease)     Cancer     colon    CHF (congestive heart failure)     Colitis     Colon cancer     COPD (chronic obstructive pulmonary disease)     Decreased hearing     Diabetes mellitus     Diabetes mellitus, type 2     Hypercholesterolemia     Hypertension     Hypoxemia     Insomnia     Obesity     Osteoarthritis        Past Surgical History:   Procedure Laterality Date    ANGIOGRAM, CORONARY, WITH LEFT HEART CATHETERIZATION N/A 2/10/2022    Procedure: Angiogram, Coronary, with Left Heart Cath;  Surgeon: Cristian Benjamin MD;  Location: [facility] CATH/EP LAB;  Service: Cardiology;  Laterality: N/A;    CARDIAC CATHETERIZATION      CHOLECYSTECTOMY      COLECTOMY      CORONARY ANGIOPLASTY      CORONARY STENT PLACEMENT      ERCP N/A 2023    Procedure: ERCP (ENDOSCOPIC RETROGRADE CHOLANGIOPANCREATOGRAPHY);  Surgeon: Biju Kramer III, MD;  Location: [facility];  Service: Endoscopy;  Laterality: N/A;    ESOPHAGOGASTRODUODENOSCOPY N/A 2023    Procedure: EGD (ESOPHAGOGASTRODUODENOSCOPY);  Surgeon: Aarti Alvarez MD;  Location: [facility];  Service: Endoscopy;  Laterality: N/A;    EXTERNAL EAR SURGERY      EYE SURGERY      FLEXIBLE SIGMOIDOSCOPY N/A 2019    Procedure: SIGMOIDOSCOPY, FLEXIBLE;  Surgeon: Biju Kramer III, MD;  Location: [facility];  Service: Endoscopy;  Laterality: N/A;    HYSTERECTOMY      partial       Time Tracking:     PT Received On: 23  PT Start Time: 915     PT Stop Time: 946  PT Total Time (min): 31 min     Billable Minutes: Evaluation 10 and Gait Training 21      2023

## 2023-05-30 NOTE — CARE UPDATE
""Good Afternoon, Presently, Marisol Kerr meets InterQual® criteria for SNF placement. Please note, if there is a change in the patient's clinical status or treatments needed, a new review will be necessary. These review findings are based on national InterQual® guidelines and information documented in the patient's Epic EMR. They do not substitute the provider's judgment for medical necessity. Thank You"    Per Laura Sanders RN  "

## 2023-05-30 NOTE — PLAN OF CARE
Problem: Occupational Therapy  Goal: Occupational Therapy Goal  Description: Goals to be met by: 6/27/2023     Patient will increase functional independence with ADLs by performing:    LE Dressing with Supervision and Assistive Devices as needed.  Grooming while standing at sink with Supervision.  Toileting from toilet with Supervision for hygiene and clothing management.   Supine to sit with Supervision.  Toilet transfer to toilet with Supervision.    Outcome: Ongoing, Progressing

## 2023-05-31 LAB
ALBUMIN SERPL BCP-MCNC: 2.7 G/DL (ref 3.5–5.2)
ALP SERPL-CCNC: 65 U/L (ref 55–135)
ALT SERPL W/O P-5'-P-CCNC: 26 U/L (ref 10–44)
ANION GAP SERPL CALC-SCNC: 4 MMOL/L (ref 8–16)
AST SERPL-CCNC: 25 U/L (ref 10–40)
BASOPHILS # BLD AUTO: 0.01 K/UL (ref 0–0.2)
BASOPHILS NFR BLD: 0.1 % (ref 0–1.9)
BILIRUB SERPL-MCNC: 0.6 MG/DL (ref 0.1–1)
BUN SERPL-MCNC: 55 MG/DL (ref 8–23)
CALCIUM SERPL-MCNC: 8.5 MG/DL (ref 8.7–10.5)
CHLORIDE SERPL-SCNC: 105 MMOL/L (ref 95–110)
CO2 SERPL-SCNC: 35 MMOL/L (ref 23–29)
CREAT SERPL-MCNC: 1.4 MG/DL (ref 0.5–1.4)
DIFFERENTIAL METHOD: ABNORMAL
EOSINOPHIL # BLD AUTO: 0 K/UL (ref 0–0.5)
EOSINOPHIL NFR BLD: 0.4 % (ref 0–8)
ERYTHROCYTE [DISTWIDTH] IN BLOOD BY AUTOMATED COUNT: 16.7 % (ref 11.5–14.5)
ERYTHROCYTE [DISTWIDTH] IN BLOOD BY AUTOMATED COUNT: 16.8 % (ref 11.5–14.5)
EST. GFR  (NO RACE VARIABLE): 39.2 ML/MIN/1.73 M^2
GLUCOSE SERPL-MCNC: 132 MG/DL (ref 70–110)
GLUCOSE SERPL-MCNC: 191 MG/DL (ref 70–110)
GLUCOSE SERPL-MCNC: 77 MG/DL (ref 70–110)
GLUCOSE SERPL-MCNC: 83 MG/DL (ref 70–110)
GLUCOSE SERPL-MCNC: 86 MG/DL (ref 70–110)
HCT VFR BLD AUTO: 33.7 % (ref 37–48.5)
HCT VFR BLD AUTO: 35 % (ref 37–48.5)
HGB BLD-MCNC: 10.1 G/DL (ref 12–16)
HGB BLD-MCNC: 10.2 G/DL (ref 12–16)
IMM GRANULOCYTES # BLD AUTO: 0.08 K/UL (ref 0–0.04)
IMM GRANULOCYTES NFR BLD AUTO: 0.9 % (ref 0–0.5)
INR PPP: 0.9 (ref 0.8–1.2)
LYMPHOCYTES # BLD AUTO: 1.4 K/UL (ref 1–4.8)
LYMPHOCYTES NFR BLD: 16.4 % (ref 18–48)
MCH RBC QN AUTO: 26.6 PG (ref 27–31)
MCH RBC QN AUTO: 27.4 PG (ref 27–31)
MCHC RBC AUTO-ENTMCNC: 29.1 G/DL (ref 32–36)
MCHC RBC AUTO-ENTMCNC: 30 G/DL (ref 32–36)
MCV RBC AUTO: 91 FL (ref 82–98)
MCV RBC AUTO: 91 FL (ref 82–98)
MONOCYTES # BLD AUTO: 0.6 K/UL (ref 0.3–1)
MONOCYTES NFR BLD: 7.1 % (ref 4–15)
NEUTROPHILS # BLD AUTO: 6.4 K/UL (ref 1.8–7.7)
NEUTROPHILS NFR BLD: 75.1 % (ref 38–73)
NRBC BLD-RTO: 0 /100 WBC
OB PNL STL: NEGATIVE
PLATELET # BLD AUTO: 188 K/UL (ref 150–450)
PLATELET # BLD AUTO: 196 K/UL (ref 150–450)
PMV BLD AUTO: 12.5 FL (ref 9.2–12.9)
PMV BLD AUTO: 12.7 FL (ref 9.2–12.9)
POTASSIUM SERPL-SCNC: 4.3 MMOL/L (ref 3.5–5.1)
PROT SERPL-MCNC: 5.4 G/DL (ref 6–8.4)
PROTHROMBIN TIME: 10.2 SEC (ref 9–12.5)
RBC # BLD AUTO: 3.69 M/UL (ref 4–5.4)
RBC # BLD AUTO: 3.83 M/UL (ref 4–5.4)
SODIUM SERPL-SCNC: 144 MMOL/L (ref 136–145)
WBC # BLD AUTO: 12.52 K/UL (ref 3.9–12.7)
WBC # BLD AUTO: 8.47 K/UL (ref 3.9–12.7)

## 2023-05-31 PROCEDURE — 82272 OCCULT BLD FECES 1-3 TESTS: CPT | Performed by: STUDENT IN AN ORGANIZED HEALTH CARE EDUCATION/TRAINING PROGRAM

## 2023-05-31 PROCEDURE — 97116 GAIT TRAINING THERAPY: CPT

## 2023-05-31 PROCEDURE — 25000242 PHARM REV CODE 250 ALT 637 W/ HCPCS: Performed by: INTERNAL MEDICINE

## 2023-05-31 PROCEDURE — 63600175 PHARM REV CODE 636 W HCPCS: Performed by: INTERNAL MEDICINE

## 2023-05-31 PROCEDURE — 25000003 PHARM REV CODE 250: Performed by: INTERNAL MEDICINE

## 2023-05-31 PROCEDURE — 36415 COLL VENOUS BLD VENIPUNCTURE: CPT | Performed by: INTERNAL MEDICINE

## 2023-05-31 PROCEDURE — 99900031 HC PATIENT EDUCATION (STAT)

## 2023-05-31 PROCEDURE — 85610 PROTHROMBIN TIME: CPT | Performed by: STUDENT IN AN ORGANIZED HEALTH CARE EDUCATION/TRAINING PROGRAM

## 2023-05-31 PROCEDURE — 97530 THERAPEUTIC ACTIVITIES: CPT

## 2023-05-31 PROCEDURE — 94760 N-INVAS EAR/PLS OXIMETRY 1: CPT

## 2023-05-31 PROCEDURE — 99900035 HC TECH TIME PER 15 MIN (STAT)

## 2023-05-31 PROCEDURE — 85027 COMPLETE CBC AUTOMATED: CPT | Performed by: STUDENT IN AN ORGANIZED HEALTH CARE EDUCATION/TRAINING PROGRAM

## 2023-05-31 PROCEDURE — 12000002 HC ACUTE/MED SURGE SEMI-PRIVATE ROOM

## 2023-05-31 PROCEDURE — 94761 N-INVAS EAR/PLS OXIMETRY MLT: CPT

## 2023-05-31 PROCEDURE — 80053 COMPREHEN METABOLIC PANEL: CPT | Performed by: INTERNAL MEDICINE

## 2023-05-31 PROCEDURE — 94640 AIRWAY INHALATION TREATMENT: CPT

## 2023-05-31 PROCEDURE — 85025 COMPLETE CBC W/AUTO DIFF WBC: CPT | Performed by: INTERNAL MEDICINE

## 2023-05-31 PROCEDURE — 27000221 HC OXYGEN, UP TO 24 HOURS

## 2023-05-31 PROCEDURE — 94660 CPAP INITIATION&MGMT: CPT

## 2023-05-31 PROCEDURE — 36415 COLL VENOUS BLD VENIPUNCTURE: CPT | Performed by: STUDENT IN AN ORGANIZED HEALTH CARE EDUCATION/TRAINING PROGRAM

## 2023-05-31 RX ADMIN — METOPROLOL TARTRATE 50 MG: 50 TABLET, FILM COATED ORAL at 08:05

## 2023-05-31 RX ADMIN — ATORVASTATIN CALCIUM 40 MG: 40 TABLET, FILM COATED ORAL at 08:05

## 2023-05-31 RX ADMIN — PREDNISONE 20 MG: 20 TABLET ORAL at 08:05

## 2023-05-31 RX ADMIN — ALLOPURINOL 50 MG: 100 TABLET ORAL at 08:05

## 2023-05-31 RX ADMIN — CEFUROXIME AXETIL 250 MG: 250 TABLET, FILM COATED ORAL at 08:05

## 2023-05-31 RX ADMIN — DILTIAZEM HYDROCHLORIDE 120 MG: 120 CAPSULE, COATED, EXTENDED RELEASE ORAL at 08:05

## 2023-05-31 RX ADMIN — IPRATROPIUM BROMIDE AND ALBUTEROL SULFATE 3 ML: .5; 3 SOLUTION RESPIRATORY (INHALATION) at 07:05

## 2023-05-31 RX ADMIN — TICAGRELOR 90 MG: 90 TABLET ORAL at 08:05

## 2023-05-31 RX ADMIN — IPRATROPIUM BROMIDE AND ALBUTEROL SULFATE 3 ML: .5; 3 SOLUTION RESPIRATORY (INHALATION) at 01:05

## 2023-05-31 RX ADMIN — CASTOR OIL AND BALSAM, PERU: 788; 87 OINTMENT TOPICAL at 08:05

## 2023-05-31 RX ADMIN — RANOLAZINE 500 MG: 500 TABLET, EXTENDED RELEASE ORAL at 08:05

## 2023-05-31 RX ADMIN — LORAZEPAM 1 MG: 2 INJECTION INTRAMUSCULAR; INTRAVENOUS at 06:05

## 2023-05-31 RX ADMIN — HEPARIN SODIUM 5000 UNITS: 5000 INJECTION, SOLUTION INTRAVENOUS; SUBCUTANEOUS at 02:05

## 2023-05-31 RX ADMIN — POLYETHYLENE GLYCOL 3350 17 G: 17 POWDER, FOR SOLUTION ORAL at 08:05

## 2023-05-31 RX ADMIN — HYDRALAZINE HYDROCHLORIDE 25 MG: 25 TABLET ORAL at 08:05

## 2023-05-31 RX ADMIN — PANTOPRAZOLE SODIUM 40 MG: 40 TABLET, DELAYED RELEASE ORAL at 05:05

## 2023-05-31 RX ADMIN — ASPIRIN 81 MG CHEWABLE TABLET 81 MG: 81 TABLET CHEWABLE at 09:05

## 2023-05-31 RX ADMIN — HEPARIN SODIUM 5000 UNITS: 5000 INJECTION, SOLUTION INTRAVENOUS; SUBCUTANEOUS at 05:05

## 2023-05-31 NOTE — NURSING
05/31/2023      Assumed care of patient.   Assessment and vital signs assessed.   Labs and meds reviewed.  Last documentation =  Temp: 98.2 °F (36.8 °C) (05/31/23 0746)  Pulse: 65 (05/31/23 0746)  Resp: 19 (05/31/23 0746)  BP: (!) 178/73 (05/31/23 0746)  SpO2: 99 % (05/31/23 0746)   AOX4, assisted to BSC, BM x 1. Patient c/o burning with urination. [place] area assessed, no redness or s/s irritation related to purewick. Purewick and all associated products exchanged. After further conversation, patient states she mistakenly said urine, but it's her feet that are burning, MD notified.   Dressing change performed to sacral wound per orders.   Currently on 4L NC continuous. Was on BIPAP HS upon arrival.    1300  Patient started having bloody stools. Early was dark dark brown, but now bright new blood. Specimen was flushed prior to allowing nurse to obtain sample. CNA now aware of need for sample. MD notified of new findings. Patient requesting anti-diarrheal, but patient does not have diarrhea, but frequent stools. All still formed.     1808  Patient resting, no acute distress. Wound care  RN saw patient, changes to wound care., BM with blood today, sent for specimen, but second specimen loose and watery, negative for occult blood. Will continue to monitor. Refusing further miralax. MD notified due to loose stools.  Accepted at [facility], awaiting readiness for discharge.

## 2023-05-31 NOTE — CARE UPDATE
05/31/23 0737   Patient Assessment/Suction   Level of Consciousness (AVPU) alert   Respiratory Effort Unlabored;Normal   Expansion/Accessory Muscles/Retractions no retractions;no use of accessory muscles   All Lung Fields Breath Sounds diminished   Rhythm/Pattern, Respiratory no shortness of breath reported   Cough Frequency infrequent   Cough Type congested   PRE-TX-O2   Device (Oxygen Therapy) nasal cannula   $ Is the patient on Low Flow Oxygen? Yes   Flow (L/min) 4   SpO2 100 %   Pulse Oximetry Type Intermittent   $ Pulse Oximetry - Single Charge Pulse Oximetry - Single   Pulse 66   Resp 18   Aerosol Therapy   $ Aerosol Therapy Charges Aerosol Treatment   Daily Review of Necessity (SVN) completed   Respiratory Treatment Status (SVN) given   Treatment Route (SVN) mask;oxygen   Patient Position (SVN) HOB elevated   Post Treatment Assessment (SVN) breath sounds unchanged   Signs of Intolerance (SVN) none   Breath Sounds Post-Respiratory Treatment   Throughout All Fields Post-Treatment All Fields   Throughout All Fields Post-Treatment no change   Post-treatment Heart Rate (beats/min) 68   Post-treatment Resp Rate (breaths/min) 18   Preset CPAP/BiPAP Settings   Mode Of Delivery BiPAP;Standby   $ CPAP/BiPAP Daily Charge BiPAP/CPAP Daily   Equipment Type V60   Education   $ Education 15 min;Bronchodilator

## 2023-05-31 NOTE — CONSULTS
Stage 1  red non blanching 7x8.5cm buttock with a 0.5cm round purple  DTI coccyx.  Cleaned and dried and applied Triad, removed Mepilex, used brief.  Notified nurse patient needs Waffle mattress. Turned to the right side, notified Dr. Bullock of pressure injury. Bilateral legs redness

## 2023-05-31 NOTE — PLAN OF CARE
05/31/23 0827   Post-Acute Status   Post-Acute Authorization Placement   Post-Acute Placement Status Referrals Sent     Per chart review, patient medically ready for SNF facility. Patient requesting discharge back to Post Acute Medical  rehab. Santa notified of above and referral sent via careport. ADRIANA notified of above    1057 - Case management met with patient at bedside. Patient verbalized wish to go back to [facility] to continue therapy on hospital discharge. Vascular surgery consult pending at this time. CM following-    1529 - per Santa, patient accepted back to [facility] on hospital discharge. Patient's daughter notified of anticipated transfer back to [facility] on tomorrow pending medical clearance.

## 2023-05-31 NOTE — PT/OT/SLP PROGRESS
Physical Therapy Treatment    Patient Name:  Marisol Kerr   MRN:  [id number]    Recommendations:     Discharge Recommendations: nursing facility, skilled  Discharge Equipment Recommendations: none  Barriers to discharge: Decreased caregiver support    Assessment:     Marisol Kerr is a 77 y.o. female admitted with a medical diagnosis of Shortness of breath.  She presents with the following impairments/functional limitations: weakness, impaired endurance, impaired self care skills, impaired functional mobility, gait instability, impaired balance, decreased safety awareness, impaired cardiopulmonary response to activity.    Pt found HOB elevated and pt agreeable to PT session. Pt's hearing aids working today, making communication much easier. Pt tolerated session fairly and required minimal A for safe mobility today.      Rehab Prognosis: Fair; patient would benefit from acute skilled PT services to address these deficits and reach maximum level of function.    Recent Surgery: * No surgery found *      Plan:     During this hospitalization, patient to be seen 6 x/week to address the identified rehab impairments via gait training, therapeutic activities, therapeutic exercises and progress toward the following goals:    Plan of Care Expires:  06/27/23    Subjective     Chief Complaint: Pt asked to use the restroom for BM.  Patient/Family Comments/goals: SNF/IRF  Pain/Comfort:  Pain Rating 1: 0/10  Pain Rating Post-Intervention 1: 0/10      Objective:     Communicated with OFELIA Greene prior to session.  Patient found HOB elevated with bed alarm, oxygen, PureWick, peripheral IV, telemetry upon PT entry to room.     General Precautions: Standard, fall, hearing impaired, vision impaired  Orthopedic Precautions: N/A  Braces: N/A  Respiratory Status: Nasal cannula, flow 4 L/min     Functional Mobility:  Bed Mobility:     Scooting: stand by assistance and with rail  Supine to Sit: minimum assistance and moderate assistance  Sit to  Supine: minimum assistance  Transfers:     Sit to Stand:  minimum assistance with rolling walker  Toilet Transfer: minimum assistance with  rolling walker and grab bars  using  Step Transfer; pt required dep assist for hygiene after BM  Gait: x 2 trials of 20 feet each with RW and minimal A for balance/safety       AM-PAC 6 CLICK MOBILITY          Treatment & Education:  Pt was educated on the following: call light use, importance of OOB activity and functional mobility to negate the negative effects of prolonged bed rest during this hospitalization, safe transfers/ambulation and discharge planning recommendations/options.      Patient left HOB elevated with all lines intact, call button in reach, and bed alarm on..    GOALS:   Multidisciplinary Problems       Physical Therapy Goals          Problem: Physical Therapy    Goal Priority Disciplines Outcome Goal Variances Interventions   Physical Therapy Goal     PT, PT/OT      Description: Goals to be met by: 23     Patient will increase functional independence with mobility by performin. Supine to sit with Supervision  2. Sit to stand transfer with Supervision  3. Bed to chair transfer with Supervision using Rolling Walker  4. Gait  x 150 feet with Supervision using Rolling Walker.                              Time Tracking:     PT Received On: 23  PT Start Time: 935     PT Stop Time: 1001  PT Total Time (min): 26 min     Billable Minutes: Gait Training 10 and Therapeutic Activity 16    Treatment Type: Treatment  PT/PTA: PT           2023

## 2023-05-31 NOTE — PT/OT/SLP PROGRESS
"Occupational Therapy      Patient Name:  Marisol Kerr   MRN:  [id number]    Attempted OT tx. Patient declined due to fatigue. Patient stated "I'm too tired. I've been walking a lot today."    5/31/2023  "

## 2023-05-31 NOTE — PLAN OF CARE
Problem: Adult Inpatient Plan of Care  Goal: Optimal Comfort and Wellbeing  Outcome: Ongoing, Progressing     Problem: Diabetes Comorbidity  Goal: Blood Glucose Level Within Targeted Range  Outcome: Ongoing, Progressing     Problem: Impaired Wound Healing  Goal: Optimal Wound Healing  Outcome: Ongoing, Progressing     Problem: Skin Injury Risk Increased  Goal: Skin Health and Integrity  Outcome: Ongoing, Progressing     Problem: Oral Intake Inadequate  Goal: Improved Oral Intake  Outcome: Ongoing, Progressing

## 2023-05-31 NOTE — RESPIRATORY THERAPY
Placed patient on bipap     05/30/23 2112   Patient Assessment/Suction   Level of Consciousness (AVPU) alert   Respiratory Effort Normal;Unlabored   Expansion/Accessory Muscles/Retractions no retractions;no use of accessory muscles   All Lung Fields Breath Sounds Anterior:;Posterior:;Lateral:;clear;equal bilaterally   Rhythm/Pattern, Respiratory no shortness of breath reported;depth regular;pattern regular;unlabored   Cough Frequency no cough   Skin Integrity   $ Wound Care Tech Time 15 min   Area Observed Behind ear;Cheek;Bridge of nose;Upper lip;Lower lip;Chin;Forehead;Nares   Skin Appearance without discoloration   PRE-TX-O2   Device (Oxygen Therapy) nasal cannula   $ Is the patient on Low Flow Oxygen? Yes   Flow (L/min) 4   Oxygen Concentration (%) 36   SpO2 96 %   Pulse Oximetry Type Intermittent   $ Pulse Oximetry - Multiple Charge Pulse Oximetry - Multiple   Pulse 70   Resp 19   Aerosol Therapy   $ Aerosol Therapy Charges Aerosol Treatment   Daily Review of Necessity (SVN) completed   Respiratory Treatment Status (SVN) given   Treatment Route (SVN) in-line  (bipap)   Patient Position (SVN) HOB elevated   Post Treatment Assessment (SVN) breath sounds improved   Signs of Intolerance (SVN) none   Breath Sounds Post-Respiratory Treatment   Throughout All Fields Post-Treatment All Fields   Throughout All Fields Post-Treatment Anterior:;Posterior:;Lateral:;clear;equal bilaterally   Post-treatment Heart Rate (beats/min) 68   Post-treatment Resp Rate (breaths/min) 19   Preset CPAP/BiPAP Settings   Mode Of Delivery BiPAP S/T   $ CPAP/BiPAP Daily Charge BiPAP/CPAP Daily   $ Is patient using? Yes   Size of Mask Small/Medium   Sized Appropriately? Yes   Equipment Type V60   Airway Device Type medium full face mask   Ipap 14   EPAP (cm H2O) 8   Pressure Support (cm H2O) 6   ITime (sec) 1   Rise Time (sec) 3   Patient CPAP/BiPAP Settings   CPAP/BIPAP ID 14   Timed Inspiration (Sec) 1   IPAP Rise Time (sec) 3   RR Total  (Breaths/Min) 19   Tidal Volume (mL) 621   VE Minute Ventilation (L/min) 10.4 L/min   Peak Inspiratory Pressure (cm H2O) 14   TiTOT (%) 23   Total Leak (L/Min) 0   Patient Trigger - ST Mode Only (%) 91   CPAP/BiPAP Alarms   High Pressure (cm H2O) 40   Low Pressure (cm H2O) 5   Minute Ventilation (L/Min) 2   High RR (breaths/min) 45   Low RR (breaths/min) 8   Apnea (Sec) 20

## 2023-05-31 NOTE — PROGRESS NOTES
"      [facility] Medicine  Progress Note    Patient Name: Marisol Kerr  MRN: [id number]  Patient Class: IP- Inpatient   Admission Date: 5/25/2023  Length of Stay: 6 days  Attending Physician: Marquise Bullock MD  Primary Care Provider: Khadar Dwyer MD        Subjective:     Principal Problem:Shortness of breath    Interval History:  Patient is improved.  CM working on placement to SNF, "awaiting 142." US LE with Significant vascular disease within the bilateral lower extremities with stenosis between the common femoral and proximal superficial femoral arteries. Will get vascular input.     Patient with reported blood in stool this morning.  Stool occult blood is pending.  Repeat hemoglobin is stable.  Holding heparin.    Review of Systems  Objective:     Vital Signs (Most Recent):  Temp: 98.9 °F (37.2 °C) (05/31/23 1221)  Pulse: 67 (05/31/23 1342)  Resp: 18 (05/31/23 1342)  BP: (!) 185/85 (nurse notified pavan) (05/31/23 1221)  SpO2: 97 % (05/31/23 1342) Vital Signs (24h Range):  Temp:  [97.4 °F (36.3 °C)-98.9 °F (37.2 °C)] 98.9 °F (37.2 °C)  Pulse:  [65-78] 67  Resp:  [17-19] 18  SpO2:  [96 %-100 %] 97 %  BP: (140-185)/(53-85) 185/85     Weight: 68.8 kg (151 lb 10.8 oz)  Body mass index is 26.45 kg/m².    Intake/Output Summary (Last 24 hours) at 5/31/2023 1458  Last data filed at 5/31/2023 0852  Gross per 24 hour   Intake 840 ml   Output 1301 ml   Net -461 ml        Physical Exam  Physical Exam     Nursing note and vitals reviewed.  Constitutional: She is not diaphoretic.  Confused and difficult to arouse.  On BiPAP.  HENT:   Head: Normocephalic and atraumatic.   Eyes: Conjunctivae are normal.   Neck:   Normal range of motion.  Cardiovascular:  Normal rate.           Pulmonary/Chest:   Poor air movement with diffuse inspiratory and expiratory rhonchi.  Positive JVD.   Abdominal: Abdomen is soft. There is no abdominal tenderness.   Musculoskeletal:         General: Normal range of motion.     "  Cervical back: Normal range of motion.      Neurological: She is alert. No sensory deficit.   Moves all extremities equally   Skin: No rash noted.   Psychiatric:   On initial examination patient has difficulty answer questions due to shortness of breath and respiratory distress.         Significant Labs: All pertinent labs within the past 24 hours have been reviewed.    Significant Imaging: I have reviewed all pertinent imaging results/findings within the past 24 hours.    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] 01/16/2019 Yes     Chronic    Chronic kidney disease, stage 4 (severe) [N18.4] 10/08/2014 Yes    DM type 2, controlled, with complication [E11.8] 06/03/2013 Yes    Essential hypertension, benign [I10] 04/09/2013 Yes      Problems Resolved During this Admission:     VTE Risk Mitigation (From admission, onward)           Ordered     IP VTE HIGH RISK PATIENT  Once         05/25/23 1814     Place sequential compression device  Until discontinued         05/25/23 1814                                      Acute hypoxemic and hypercapnic respiratory failure, likely secondary to CHF, very severe COPD, NSTEMI  -improved, baseline, pending SNF placement  -continue BiPAP at night  -hold Lasix for now, patient has diuresed well, some hypotension.  Consider restarting if hypertensive  - Pulmonary consult appreciated  -NTG drip, heparin drip x 48 hours  -empiric antibiotics for possible pneumonia    Prolonged QTC, improved  -monitor on tele, avoid QTC prolonging agents        History of multivessel CAD with stent  Abnormal EKG with ST depressions  -She had left heart catheterization 2/2022 severe triple-vessel disease and had complication with rectus sheet hematoma/bleeding and transferred to [facility] for IR intervention and embolization.  She was deemed too high risk for CABG.  She states that she was told she was too high risk for PCI, they are not planning on doing an angiogram for high risk PCI, and she will be medically managed  - finished 48 hours heparin gtt  -c/w PTA asa/brilinta    Significant vascular disease within the bilateral lower extremities with stenosis between the common femoral and proximal superficial femoral arteries. Will get vascular input.   -get vascular input       Metabolic encephalopathy, improving    CKD 3-monitor ins and outs, avoid nephrotoxic agents     [id number]      HSQ prophylaxis, ppi    Restart home medications as ordered    Dispo:  DC pending sniff placement, would like vascular evaluation but also can wait until outpatient    Marquise Bullock MD  Department of Hospital Medicine   Critical access hospital

## 2023-06-01 VITALS
OXYGEN SATURATION: 99 % | HEART RATE: 76 BPM | TEMPERATURE: 98 F | SYSTOLIC BLOOD PRESSURE: 174 MMHG | DIASTOLIC BLOOD PRESSURE: 90 MMHG | HEIGHT: 64 IN | RESPIRATION RATE: 14 BRPM | WEIGHT: 151.69 LBS | BODY MASS INDEX: 25.9 KG/M2

## 2023-06-01 LAB
ALBUMIN SERPL BCP-MCNC: 2.6 G/DL (ref 3.5–5.2)
ALP SERPL-CCNC: 62 U/L (ref 55–135)
ALT SERPL W/O P-5'-P-CCNC: 29 U/L (ref 10–44)
ANION GAP SERPL CALC-SCNC: 5 MMOL/L (ref 8–16)
AST SERPL-CCNC: 21 U/L (ref 10–40)
BASOPHILS # BLD AUTO: 0.02 K/UL (ref 0–0.2)
BASOPHILS NFR BLD: 0.2 % (ref 0–1.9)
BILIRUB SERPL-MCNC: 0.7 MG/DL (ref 0.1–1)
BUN SERPL-MCNC: 50 MG/DL (ref 8–23)
CALCIUM SERPL-MCNC: 8.6 MG/DL (ref 8.7–10.5)
CHLORIDE SERPL-SCNC: 105 MMOL/L (ref 95–110)
CO2 SERPL-SCNC: 35 MMOL/L (ref 23–29)
CREAT SERPL-MCNC: 1.4 MG/DL (ref 0.5–1.4)
DIFFERENTIAL METHOD: ABNORMAL
EOSINOPHIL # BLD AUTO: 0.1 K/UL (ref 0–0.5)
EOSINOPHIL NFR BLD: 1 % (ref 0–8)
ERYTHROCYTE [DISTWIDTH] IN BLOOD BY AUTOMATED COUNT: 16.7 % (ref 11.5–14.5)
EST. GFR  (NO RACE VARIABLE): 39.2 ML/MIN/1.73 M^2
GLUCOSE SERPL-MCNC: 70 MG/DL (ref 70–110)
GLUCOSE SERPL-MCNC: 75 MG/DL (ref 70–110)
HCT VFR BLD AUTO: 34.1 % (ref 37–48.5)
HGB BLD-MCNC: 10.2 G/DL (ref 12–16)
IMM GRANULOCYTES # BLD AUTO: 0.12 K/UL (ref 0–0.04)
IMM GRANULOCYTES NFR BLD AUTO: 1.3 % (ref 0–0.5)
LYMPHOCYTES # BLD AUTO: 1.9 K/UL (ref 1–4.8)
LYMPHOCYTES NFR BLD: 20.6 % (ref 18–48)
MCH RBC QN AUTO: 27.4 PG (ref 27–31)
MCHC RBC AUTO-ENTMCNC: 29.9 G/DL (ref 32–36)
MCV RBC AUTO: 92 FL (ref 82–98)
MONOCYTES # BLD AUTO: 0.7 K/UL (ref 0.3–1)
MONOCYTES NFR BLD: 7.6 % (ref 4–15)
NEUTROPHILS # BLD AUTO: 6.5 K/UL (ref 1.8–7.7)
NEUTROPHILS NFR BLD: 69.3 % (ref 38–73)
NRBC BLD-RTO: 0 /100 WBC
PLATELET # BLD AUTO: 180 K/UL (ref 150–450)
PMV BLD AUTO: 12.4 FL (ref 9.2–12.9)
POTASSIUM SERPL-SCNC: 4.5 MMOL/L (ref 3.5–5.1)
PROT SERPL-MCNC: 5.3 G/DL (ref 6–8.4)
RBC # BLD AUTO: 3.72 M/UL (ref 4–5.4)
SODIUM SERPL-SCNC: 145 MMOL/L (ref 136–145)
WBC # BLD AUTO: 9.33 K/UL (ref 3.9–12.7)

## 2023-06-01 PROCEDURE — 27000221 HC OXYGEN, UP TO 24 HOURS

## 2023-06-01 PROCEDURE — 99900031 HC PATIENT EDUCATION (STAT)

## 2023-06-01 PROCEDURE — 94760 N-INVAS EAR/PLS OXIMETRY 1: CPT

## 2023-06-01 PROCEDURE — 36415 COLL VENOUS BLD VENIPUNCTURE: CPT | Performed by: INTERNAL MEDICINE

## 2023-06-01 PROCEDURE — 25000003 PHARM REV CODE 250: Performed by: INTERNAL MEDICINE

## 2023-06-01 PROCEDURE — 80053 COMPREHEN METABOLIC PANEL: CPT | Performed by: INTERNAL MEDICINE

## 2023-06-01 PROCEDURE — 99900035 HC TECH TIME PER 15 MIN (STAT)

## 2023-06-01 PROCEDURE — 94640 AIRWAY INHALATION TREATMENT: CPT

## 2023-06-01 PROCEDURE — 85025 COMPLETE CBC W/AUTO DIFF WBC: CPT | Performed by: INTERNAL MEDICINE

## 2023-06-01 PROCEDURE — 94660 CPAP INITIATION&MGMT: CPT

## 2023-06-01 PROCEDURE — 94761 N-INVAS EAR/PLS OXIMETRY MLT: CPT

## 2023-06-01 PROCEDURE — 63600175 PHARM REV CODE 636 W HCPCS: Performed by: INTERNAL MEDICINE

## 2023-06-01 PROCEDURE — 25000242 PHARM REV CODE 250 ALT 637 W/ HCPCS: Performed by: INTERNAL MEDICINE

## 2023-06-01 RX ADMIN — PANTOPRAZOLE SODIUM 40 MG: 40 TABLET, DELAYED RELEASE ORAL at 05:06

## 2023-06-01 RX ADMIN — RANOLAZINE 500 MG: 500 TABLET, EXTENDED RELEASE ORAL at 08:06

## 2023-06-01 RX ADMIN — ALLOPURINOL 50 MG: 100 TABLET ORAL at 09:06

## 2023-06-01 RX ADMIN — ASPIRIN 81 MG CHEWABLE TABLET 81 MG: 81 TABLET CHEWABLE at 08:06

## 2023-06-01 RX ADMIN — METOPROLOL TARTRATE 50 MG: 50 TABLET, FILM COATED ORAL at 08:06

## 2023-06-01 RX ADMIN — CEFUROXIME AXETIL 250 MG: 250 TABLET, FILM COATED ORAL at 08:06

## 2023-06-01 RX ADMIN — HYDRALAZINE HYDROCHLORIDE 25 MG: 25 TABLET ORAL at 08:06

## 2023-06-01 RX ADMIN — PREDNISONE 20 MG: 20 TABLET ORAL at 08:06

## 2023-06-01 RX ADMIN — IPRATROPIUM BROMIDE AND ALBUTEROL SULFATE 3 ML: .5; 3 SOLUTION RESPIRATORY (INHALATION) at 08:06

## 2023-06-01 RX ADMIN — ATORVASTATIN CALCIUM 40 MG: 40 TABLET, FILM COATED ORAL at 08:06

## 2023-06-01 RX ADMIN — DILTIAZEM HYDROCHLORIDE 120 MG: 120 CAPSULE, COATED, EXTENDED RELEASE ORAL at 09:06

## 2023-06-01 RX ADMIN — TICAGRELOR 90 MG: 90 TABLET ORAL at 08:06

## 2023-06-01 NOTE — CARE UPDATE
05/31/23 1901   Patient Assessment/Suction   Level of Consciousness (AVPU) alert   Respiratory Effort Labored   Expansion/Accessory Muscles/Retractions no use of accessory muscles   All Lung Fields Breath Sounds diminished   Rhythm/Pattern, Respiratory shortness of breath   Cough Frequency infrequent   PRE-TX-O2   Device (Oxygen Therapy) BIPAP   SpO2 96 %   Pulse Oximetry Type Intermittent   $ Pulse Oximetry - Multiple Charge Pulse Oximetry - Multiple   Pulse 98   Resp (!) 28   Aerosol Therapy   $ Aerosol Therapy Charges Aerosol Treatment   Daily Review of Necessity (SVN) completed   Respiratory Treatment Status (SVN) given   Treatment Route (SVN) in-line;ventilator   Patient Position (SVN) HOB elevated   Signs of Intolerance (SVN) none   Breath Sounds Post-Respiratory Treatment   Throughout All Fields Post-Treatment All Fields   Throughout All Fields Post-Treatment no change   Post-treatment Heart Rate (beats/min) 83   Post-treatment Resp Rate (breaths/min) 24   Preset CPAP/BiPAP Settings   Mode Of Delivery BiPAP   $ Is patient using? Yes   Size of Mask Small/Medium   Sized Appropriately? Yes   Equipment Type V60   Airway Device Type medium full face mask   Ipap 14   EPAP (cm H2O) 8   Pressure Support (cm H2O) 6   Set Rate (Breaths/Min) 10   ITime (sec) 0.8   Rise Time (sec) 3   Patient CPAP/BiPAP Settings   CPAP/BIPAP ID 14   Timed Inspiration (Sec) 0.8   IPAP Rise Time (sec) 3   RR Total (Breaths/Min) 22   Tidal Volume (mL) 659   VE Minute Ventilation (L/min) 14.3 L/min   Peak Inspiratory Pressure (cm H2O) 14   TiTOT (%) 29   Total Leak (L/Min) 0   Patient Trigger - ST Mode Only (%) 96   CPAP/BiPAP Alarms   High Pressure (cm H2O) 40   Low Pressure (cm H2O) 5   Minute Ventilation (L/Min) 2   High RR (breaths/min) 45   Low RR (breaths/min) 8   Apnea (Sec) 20     Upon entry, Pt was SOB on 4L NC. I switched her to bipap and gave breathing treatment. She is now stable, will continue to monitor.

## 2023-06-01 NOTE — NURSING
06/01/2023      Assumed care of patient.   Assessment and vital signs assessed.   Labs and meds reviewed.  AOX4, forgetful  Denies pain  BIPAP on at this time, still resting. Wears NC O2 when not on Bipap.  Possible D/C today to [facility], bed is assigned.    Orders noted for discharge today. Patient aware. Awaiting further readiness for discharge to next facility    1258  Attempted to call report to [facility] twice, once it hung up, second, no one answered, awaiting call back. Transport present to take patient. IV's and Tele were removed. All belongings packed and patient is ready to go. Discharge papers are in patient's hand for discharge.    1301  Report called to Zeinab SOMMER at [facility]

## 2023-06-01 NOTE — DISCHARGE SUMMARY
[facility]  Discharge Summary      Patient Name: Marisol Kerr  MRN: [id number]  YURY: [id number]  Patient Class: IP- Inpatient  Admission Date: 5/25/2023  Hospital Length of Stay: 7 days  Discharge Date and Time:  06/01/2023 10:16 AM  Attending Physician: Marquise Bullock MD   Discharging Provider: Marquise Bullock MD  Primary Care Provider: Khadar Dwyer MD    Primary Care Team: Networked reference to record PCT     HPI:   77-year-old female history of multivessel CAD with stent on DAPT, very severe COPD, chronic diastolic CHF, chronic hypoxemic respiratory failure on oxygen, obesity, CKD 3, history of cholangitis status post ERCP with sphincterotomy and bile duct stone removal, type 2 diabetes.      Presents to the ER shortness of breath, saturation 78% with paramedics.  Given oxygen DuoNeb EN route.  Has had multiple admissions for respiratory problems in the past.      * No surgery found *      Hospital Course:      Acute hypoxemic and hypercapnic respiratory failure, likely secondary to CHF, very severe COPD, NSTEMI  -improved, baseline, pending SNF placement  -continue BiPAP at night  -hold Lasix for now, patient has diuresed well, some hypotension.  Consider restarting if hypertensive  - Pulmonary consult appreciated  -patient finished empiric course of antibiotics and steroids for possible pneumonia.   -patient now back on her home oxygen.  Patient discharged to inpatient rehab June 1st.       History of multivessel CAD with stent  Abnormal EKG with ST depressions  -She had left heart catheterization 2/2022 severe triple-vessel disease and had complication with rectus sheet hematoma/bleeding and transferred to [facility] for IR intervention and embolization.  She was deemed too high risk for CABG.  She states that she was told she was too high risk for PCI, they are not planning on doing an angiogram for high risk PCI, and she will be medically managed  - finished 48 hours heparin gtt -  now completed  -c/w PTA asa/brilinta     Significant vascular disease within the bilateral lower extremities with stenosis between the common femoral and proximal superficial femoral arteries.  Patient needs to follow up outpatient with vascular surgery.  Ambulatory referral placed.       Goals of Care Treatment Preferences:  Code Status: DNR         Physical Exam     Nursing note and vitals reviewed.  Constitutional: She is not diaphoretic.  Alert and oriented.  On nasal cannula.  HENT:   Head: Normocephalic and atraumatic.   Eyes: Conjunctivae are normal.   Neck:   Normal range of motion.  Cardiovascular:  Normal rate.           Pulmonary/Chest:   Poor air movement with diffuse inspiratory and expiratory rhonchi.   Abdominal: Abdomen is soft. There is no abdominal tenderness.   Musculoskeletal:         General: Normal range of motion.      Cervical back: Normal range of motion.      Neurological: She is alert. No sensory deficit.   Moves all extremities equally   Skin: No rash noted.         What is most important right now is to focus on extending life as long as possible, even it it means sacrificing quality.  Accordingly, we have decided that the best plan to meet the patient's goals includes continuing with treatment.      Consults:   Consults (From admission, onward)        Status Ordering Provider     Inpatient consult to Vascular Surgery  Once        Provider:  Marquise Peterson MD    Acknowledged REYNA GARCIA     Inpatient consult to Palliative Care  Once        Provider:  Cristofer Kiser MD    Completed REYNA GARCIA     Inpatient consult to Registered Dietitian/Nutritionist  Once        Provider:  (Not yet assigned)    Completed REYNA GARCIA     IP consult to case management  Once        Provider:  (Not yet assigned)    Completed REYNA GARCIA     Inpatient consult to Cardiology  Once        Provider:  Cristian Benjamin MD    Completed REYNA GARCIA     Inpatient  consult to Pulmonology  Once        Provider:  Dereje Saul MD    Completed REYNA GARCIA          No new Assessment & Plan notes have been filed under this hospital service since the last note was generated.  Service: Hospital Medicine    Final Active Diagnoses:    Diagnosis Date Noted POA    PRINCIPAL PROBLEM:  Shortness of breath [R06.02] 01/28/2023 Unknown    Goals of care, counseling/discussion [Z71.89] 05/29/2023 Not Applicable    Acute on chronic respiratory failure with hypoxia and hypercapnia [J96.21, J96.22] 05/26/2023 Yes    Prolonged QT interval [R94.31] 05/26/2023 Yes    COPD exacerbation [J44.1] 01/09/2023 Yes    Hard of hearing [H91.90] 02/10/2022 Yes     Chronic    COPD/emphysema [J44.9] 01/16/2019 Yes     Chronic    Chronic kidney disease, stage 4 (severe) [N18.4] 10/08/2014 Yes    DM type 2, controlled, with complication [E11.8] 06/03/2013 Yes    Essential hypertension, benign [I10] 04/09/2013 Yes      Problems Resolved During this Admission:       Discharged Condition: good    Disposition: Rehab Facility    Follow Up:   Contact information for follow-up providers     Jayne Correa MD Follow up.    Specialties: Pulmonary Disease, Sleep Medicine  Why: Per Dr. Correa, please schedule hospital follow up when rehab complete.  Contact information:  [address] [address] [phone]                   Contact information for after-discharge care     Destination     [facility] .    Service: Inpatient Rehabilitation  Contact information:  [address] [facility] [address] [phone]                           Patient Instructions:      Ambulatory referral/consult to Vascular Surgery   Standing Status: Future   Referral Priority: Routine Referral Type: Consultation   Referral Reason: Specialty Services Required   Requested Specialty: Vascular Surgery   Number of Visits Requested: 1        Significant Diagnostic Studies: N/A    Pending Diagnostic Studies:     None         Medications:  Reconciled Home Medications:      Medication List      CONTINUE taking these medications    albuterol 90 mcg/actuation inhaler  Commonly known as: VENTOLIN HFA  Inhale 2 puffs into the lungs every 6 (six) hours as needed for Wheezing or Shortness of Breath. Rescue     allopurinoL 100 MG tablet  Commonly known as: ZYLOPRIM  Take 0.5 tablets (50 mg total) by mouth once daily.     aspirin 81 MG EC tablet  Commonly known as: ECOTRIN  Take 1 tablet (81 mg total) by mouth every morning.     atorvastatin 40 MG tablet  Commonly known as: LIPITOR  Take 1 tablet (40 mg total) by mouth once daily.     cholecalciferol (vitamin D3) 125 mcg (5,000 unit) Tab  Take 5,000 Units by mouth once daily.     cholestyramine 4 gram packet  Commonly known as: QUESTRAN  Take 1 packet (4 g total) by mouth 2 (two) times daily.     coenzyme Q10 100 mg capsule  Take 100 mg by mouth every morning.     * CombiVENT RESPIMAT  mcg/actuation inhaler  Generic drug: ipratropium-albuteroL  Inhale 2 puffs into the lungs every 4 (four) hours as needed for Shortness of Breath. Rescue     * albuterol-ipratropium 2.5 mg-0.5 mg/3 mL nebulizer solution  Commonly known as: DUO-NEB  Take 3 mLs by nebulization every 4 (four) hours as needed for Wheezing or Shortness of Breath. Rescue     diltiaZEM 120 MG Cp24  Commonly known as: CARDIZEM CD  Take 1 capsule (120 mg total) by mouth once daily.     ergocalciferol 50,000 unit Cap  Commonly known as: VITAMIN D2  Take 1 capsule (50,000 Units total) by mouth every 7 days.     ferrous sulfate 325 (65 FE) MG EC tablet  Take 325 mg by mouth once daily.     fish oil-omega-3 fatty acids 300-1,000 mg capsule  Take 2 capsules by mouth every morning.     furosemide 40 MG tablet  Commonly known as: LASIX  Take 1 tablet (40 mg total) by mouth once daily.     hydrALAZINE 50 MG tablet  Commonly known as: APRESOLINE  Take 1  tablet (50 mg total) by mouth every 12 (twelve) hours.     INCRUSE ELLIPTA 62.5 mcg/actuation inhalation capsule  Generic drug: umeclidinium  Inhale 62.5 mcg into the lungs every morning. Controller     isosorbide mononitrate 120 MG 24 hr tablet  Commonly known as: IMDUR  Take 1 tablet (120 mg total) by mouth once daily.     metoprolol tartrate 50 MG tablet  Commonly known as: LOPRESSOR  Take 1 tablet (50 mg total) by mouth 2 (two) times daily.     mupirocin 2 % ointment  Commonly known as: BACTROBAN  Apply topically 2 (two) times daily.     nebulizer and compressor Reshma  as directed     nitroGLYCERIN 0.4 MG/DOSE TL SPRY 400 mcg/spray spray  Commonly known as: NITROLINGUAL  Place 1 spray under the tongue every 5 (five) minutes as needed for Chest pain (max of 3 doses).     ondansetron 4 MG Tbdl  Commonly known as: ZOFRAN-ODT  Take 1 tablet (4 mg total) by mouth every 6 (six) hours as needed (nausea).     pantoprazole 40 MG tablet  Commonly known as: PROTONIX  Take 1 tablet (40 mg total) by mouth once daily.     polycarbophil 625 mg tablet  Commonly known as: FIBERCON  Take 625 mg by mouth once daily.     PRESERVISION AREDS-2 ORAL  Take 1 tablet by mouth once daily.     ranolazine 500 MG Tb12  Commonly known as: RANEXA  Take 1 tablet (500 mg total) by mouth 2 (two) times daily.     ticagrelor 90 mg tablet  Commonly known as: BRILINTA  Take 1 tablet (90 mg total) by mouth every 12 (twelve) hours.         * This list has 2 medication(s) that are the same as other medications prescribed for you. Read the directions carefully, and ask your doctor or other care provider to review them with you.                Indwelling Lines/Drains at time of discharge:   Lines/Drains/Airways     None                 Time spent on the discharge of patient: 45 minutes         Marquise Bullock MD  Department of Hospital Medicine  [facility]

## 2023-06-01 NOTE — PLAN OF CARE
Discharge orders and AVS sent to [facility] via careport for discharge today. CM following.    06/01/23 0944   Post-Acute Status   Post-Acute Authorization Placement   Post-Acute Placement Status Pending post-acute provider review/more information requested

## 2023-06-01 NOTE — HOSPITAL COURSE
Acute hypoxemic and hypercapnic respiratory failure, likely secondary to CHF, very severe COPD, NSTEMI  -improved, baseline, pending SNF placement  -continue BiPAP at night  -hold Lasix for now, patient has diuresed well, some hypotension.  Consider restarting if hypertensive  - Pulmonary consult appreciated  -patient finished empiric course of antibiotics and steroids for possible pneumonia.   -patient now back on her home oxygen.  Patient discharged to inpatient rehab June 1st.       History of multivessel CAD with stent  Abnormal EKG with ST depressions  -She had left heart catheterization 2/2022 severe triple-vessel disease and had complication with rectus sheet hematoma/bleeding and transferred [place] [facility] [place] intervention and embolization.  She was deemed too high risk for CABG.  She states that she was told she was too high risk for PCI, they are not planning on doing an angiogram for high risk PCI, and she will be medically managed  - finished 48 hours heparin gtt - now completed  -c/w PTA asa/brilinta     Significant vascular disease within the bilateral lower extremities with stenosis between the common femoral and proximal superficial femoral arteries.  Patient needs to follow up outpatient with vascular surgery.  Ambulatory referral placed.

## 2023-06-01 NOTE — DISCHARGE INSTRUCTIONS
Thank you for allowing us at [facility] to care for you and your medical needs.    We wish you the best upon your discharge from our health system.    Please take note to the following information:   *Follow up with your outpatient physicians/clinics for continuity of care.  *If you have any additional questions regarding your medical diagnosis,           treatments, etc. After discharge, please follow up with your primary physician.  *Please seek additional medical help if you feel you are having a life-threatening        complication.  *Be sure to take your medications as ordered.  Additional questions regarding         medications, side effects, adverse reactions, administration instructions,           notify your pharmacy or your primary physician.    Going Home from the Hospital with [employer]    How Do I Access my Discharge Instructions in [employer]?  Go to [email or web address] WITH [email or web address] [employer]  Select My Medical Record  Select Hospital Admissions  From here, you can review your hospital After Visit Summary, including your discharge instructions.    How Do I Make a Follow-Up Appointment in [employer]?  Go to [email or web address] WITH [employer] Apps  Select Appointments  Select the link Schedule an Appt  Follow the prompts to schedule your appointment    Additional Information  If you have questions, you can email or talk to our [employer] staff. Remember, [employer] is NOT to be used for urgent needs. For medical emergencies, dial 911.

## 2023-06-01 NOTE — CARE UPDATE
06/01/23 0819   Patient Assessment/Suction   Level of Consciousness (AVPU) alert   Respiratory Effort Normal;Unlabored   Expansion/Accessory Muscles/Retractions no use of accessory muscles   All Lung Fields Breath Sounds clear;diminished   WILMER Breath Sounds diminished   LLL Breath Sounds diminished   RUL Breath Sounds diminished   RML Breath Sounds diminished   RLL Breath Sounds clear   Rhythm/Pattern, Respiratory pattern regular;unlabored   Cough Frequency infrequent   Cough Type congested;nonproductive;good   PRE-TX-O2   Device (Oxygen Therapy) nasal cannula   $ Is the patient on Low Flow Oxygen? Yes   Flow (L/min) 4   Oxygen Concentration (%) 36   Pulse Oximetry Type Intermittent   $ Pulse Oximetry - Single Charge Pulse Oximetry - Single   Pulse 76   Resp 14   Aerosol Therapy   $ Aerosol Therapy Charges Aerosol Treatment   Daily Review of Necessity (SVN) completed   Respiratory Treatment Status (SVN) given   Treatment Route (SVN) mask;oxygen   Patient Position (SVN) HOB elevated   Post Treatment Assessment (SVN) increased aeration   Signs of Intolerance (SVN) none   Breath Sounds Post-Respiratory Treatment   Throughout All Fields Post-Treatment All Fields   Throughout All Fields Post-Treatment aeration increased   Post-treatment Heart Rate (beats/min) 80   Post-treatment Resp Rate (breaths/min) 16   Ready to Wean/Extubation Screen   FIO2<=50 (chart decimal) 0.36   Education   $ Education Bronchodilator;15 min

## 2023-06-01 NOTE — PHYSICIAN QUERY
"PT Name: Marisol Kerr  MR #: [id number]    DOCUMENTATION CLARIFICATION     CDS/: Lucille Del Rosario               Contact [phone] or Epic messenger  This form is a permanent document in the medical record.    Query Date: June 1, 2023      By submitting this query, we are merely seeking further clarification of documentation.  Please utilize your independent clinical judgment when addressing the question(s) below.    The Medical Record reflects the following:    Clinical Information Location in Medical Record   Risk Factors:  CAD, CHF, Severe COPD, DM2   Progress Notes   Clinical Indicators:  Blanchable redness of localized area - coccyx. Date & time first assessed 5/26/23 @0101    "Sacral stage 1 to the sacral/coccyx right buttocks"  Stage 1 red non blanching 7x8.5 cm buttock round purple DTI coccyx     Pt lines/drains/airway status (Medications)    Wound Note 5/26  Wound Note 5/31   Interventions:  Wound consult ordered    Clean area with chlorhexidine/ns  Pat dry  Apply venelex and cover with mepilex  Cleaned and dried and applied Triad, removed Mepilex, used brief.  Notified nurse patient needs Waffle mattress.      5/26 (Nursing orders)    Wound Note 5/26  Wound Note 5/31       Please clarify/confirm the Consultants diagnosis:     [ y ] Stage 1 sacral/coccyx/R buttocks DTI Ruled In and POA   [  ] Stage 1 sacral/coccyx/R buttocks DTI Ruled In and Not POA   [   ] Stage 1 sacral/coccyx/R buttocks DTI Ruled Out   [  ] Other diagnosis (please specify):             "

## 2023-06-01 NOTE — PLAN OF CARE
Problem: Adult Inpatient Plan of Care  Goal: Patient-Specific Goal (Individualized)  Outcome: Ongoing, Progressing     Problem: Adult Inpatient Plan of Care  Goal: Plan of Care Review  Outcome: Ongoing, Progressing     Problem: Adult Inpatient Plan of Care  Goal: Optimal Comfort and Wellbeing  Outcome: Ongoing, Progressing     Problem: Diabetes Comorbidity  Goal: Blood Glucose Level Within Targeted Range  Outcome: Ongoing, Progressing     Problem: Impaired Wound Healing  Goal: Optimal Wound Healing  Outcome: Ongoing, Progressing

## 2023-06-01 NOTE — PLAN OF CARE
06/01/23 1237   Final Note   Assessment Type Final Discharge Note   Anticipated Discharge Disposition Rehab   What phone number can be called within the next 1-3 days to see how you are doing after discharge? [phone]   Hospital Resources/Appts/Education Provided Post-Acute resouces added to AVS   Post-Acute Status   Post-Acute Authorization Placement   Post-Acute Placement Status Set-up Complete/Auth obtained   Discharge Delays (!) Ambulance Transport/Facility Transport     Discharge orders and chart reviewed. No other discharge needs noted at this time. Pt is clear for discharge from case management. Pt is discharging to Post Acute Medical IP Rehab.    Per Santa, report information given to primary nurse. Transport scheduled for 1245.

## 2023-06-01 NOTE — PHYSICIAN QUERY
PT Name: Marisol Kerr  MR #: [id number]     DOCUMENTATION CLARIFICATION      CDS/: Lucille Del Rosario               Contact [phone] or Epic messenger  This form is a permanent document in the medical record.    Query Date: June 1, 2023    By submitting this query, we are merely seeking further clarification of documentation to reflect the severity of illness of your patient. Please utilize your independent clinical judgment when addressing the question(s) below.     The Medical Record contains the following:   Clinical Information   Location in Medical Record    Risk Factors:  CAD, CHF, severe COPD, DM2   Progress Notes    Clinical Indicators:    Cardiology noted that Troponin elevation attributed to demand ischemia    NSTEMI noted by [facility]    Troponin (5/25 & 5/26) 623.7/546.8    EKG 5/25: Sinus tachycardia   Nonspecific ST and T wave abnormality suggests ischemia   EKG 5/26: Septal infarct ,age undetermined   When compared with ECG of 25-MAY-2023 21:24,   Septal infarct is now Present   EKG 5/28: When compared with ECG of 26-MAY-2023 09:33,   Criteria for Septal infarct are no longer Present     Cardiology PN 5/28      Hospitalist PN's    Labs            Labs    Interventions:  Deemed high risk for CABG, high risk for PCI, will be medically managed    BNP, troponin, serial EKG x's 3, ECHO    Atorvastatin, hydralazine, metoprolol, ranolazine, ticagrelor  Heparin & nitroglycerin infusions   Hospitalist PN's      Labs      MAR      Provider, please clarify the diagnosis related to the above documentation:  [   ] NSTEMI   [ y  ] NSTEMI/Myocardial Infarction Type 2 due to Demand Ischemia    [   ] Demand Ischemia   [   ] Other Cardiac Diagnosis (please specify): _______________       Form No. [id number]

## 2023-06-01 NOTE — PLAN OF CARE
06/01/23 0928   Post-Acute Status   Post-Acute Authorization Placement   Post-Acute Placement Status Set-up Complete/Auth obtained     Per Santa, patient accepted back to post Acute Medical IPR- bed available on today. Discharge orders requested from Dr. Bullock.

## 2023-06-01 NOTE — PLAN OF CARE
PRASHANT spoke to Santa at [phone] and confirmed that they are able to admit today . She will get pts room assigned today and let pts nurse know who to call report to and arrange transport . CM following.     Per Santa- she gave report info to Ramona and van will pick pt up at 12:45 pm- Discharge destination closed in careport    06/01/23 1046   Post-Acute Status   Post-Acute Authorization Placement   Post-Acute Placement Status Pending Bed Availability

## 2023-06-01 NOTE — PT/OT/SLP PROGRESS
Physical Therapy      Patient Name:  Marisol Kerr   MRN:  [id number]    Patient not seen today secondary to Patient unwilling to participate, Pain, Patient fatigue. Will follow-up 6/2/23.

## 2023-06-01 NOTE — HPI
77-year-old female history of multivessel CAD with stent on DAPT, very severe COPD, chronic diastolic CHF, chronic hypoxemic respiratory failure on oxygen, obesity, CKD 3, history of cholangitis status post ERCP with sphincterotomy and bile duct stone removal, type 2 diabetes.      Presents to the ER shortness of breath, saturation 78% with paramedics.  Given oxygen DuoNeb EN route.  Has had multiple admissions for respiratory problems in the past.

## 2023-06-05 ENCOUNTER — DOCUMENT SCAN (OUTPATIENT)
Dept: HOME HEALTH SERVICES | Facility: HOSPITAL | Age: 78
End: 2023-06-05
Payer: MEDICARE

## 2023-06-15 ENCOUNTER — DOCUMENT SCAN (OUTPATIENT)
Dept: HOME HEALTH SERVICES | Facility: HOSPITAL | Age: 78
End: 2023-06-15
Payer: MEDICARE

## 2023-06-16 ENCOUNTER — DOCUMENT SCAN (OUTPATIENT)
Dept: HOME HEALTH SERVICES | Facility: HOSPITAL | Age: 78
End: 2023-06-16
Payer: MEDICARE

## 2023-06-27 ENCOUNTER — TELEPHONE (OUTPATIENT)
Dept: FAMILY MEDICINE | Facility: CLINIC | Age: 78
End: 2023-06-27

## 2023-06-27 ENCOUNTER — OFFICE VISIT (OUTPATIENT)
Dept: FAMILY MEDICINE | Facility: CLINIC | Age: 78
End: 2023-06-27
Attending: FAMILY MEDICINE
Payer: MEDICARE

## 2023-06-27 VITALS — HEART RATE: 60 BPM | SYSTOLIC BLOOD PRESSURE: 90 MMHG | DIASTOLIC BLOOD PRESSURE: 60 MMHG

## 2023-06-27 DIAGNOSIS — I50.33 CONGESTIVE HEART FAILURE WITH LEFT VENTRICULAR DIASTOLIC DYSFUNCTION, ACUTE ON CHRONIC: ICD-10-CM

## 2023-06-27 DIAGNOSIS — R53.81 DEBILITY: ICD-10-CM

## 2023-06-27 DIAGNOSIS — J96.11 CHRONIC HYPOXEMIC RESPIRATORY FAILURE: Primary | ICD-10-CM

## 2023-06-27 DIAGNOSIS — J96.22 ACUTE ON CHRONIC RESPIRATORY FAILURE WITH HYPOXIA AND HYPERCAPNIA: ICD-10-CM

## 2023-06-27 DIAGNOSIS — M10.9 GOUT, UNSPECIFIED CAUSE, UNSPECIFIED CHRONICITY, UNSPECIFIED SITE: ICD-10-CM

## 2023-06-27 DIAGNOSIS — I10 ESSENTIAL HYPERTENSION, BENIGN: ICD-10-CM

## 2023-06-27 DIAGNOSIS — J44.1 COPD EXACERBATION: ICD-10-CM

## 2023-06-27 DIAGNOSIS — M16.0 PRIMARY OSTEOARTHRITIS OF BOTH HIPS: ICD-10-CM

## 2023-06-27 DIAGNOSIS — Z09 HOSPITAL DISCHARGE FOLLOW-UP: ICD-10-CM

## 2023-06-27 DIAGNOSIS — E11.8 DM TYPE 2, CONTROLLED, WITH COMPLICATION: ICD-10-CM

## 2023-06-27 DIAGNOSIS — J96.21 ACUTE ON CHRONIC RESPIRATORY FAILURE WITH HYPOXIA AND HYPERCAPNIA: ICD-10-CM

## 2023-06-27 DIAGNOSIS — I87.8 VENOUS STASIS: Chronic | ICD-10-CM

## 2023-06-27 DIAGNOSIS — E11.42 DIABETIC PERIPHERAL NEUROPATHY: ICD-10-CM

## 2023-06-27 DIAGNOSIS — K21.9 GASTROESOPHAGEAL REFLUX DISEASE WITHOUT ESOPHAGITIS: ICD-10-CM

## 2023-06-27 DIAGNOSIS — I10 PRIMARY HYPERTENSION: ICD-10-CM

## 2023-06-27 DIAGNOSIS — J44.9 CHRONIC OBSTRUCTIVE PULMONARY DISEASE, UNSPECIFIED COPD TYPE: Chronic | ICD-10-CM

## 2023-06-27 DIAGNOSIS — I25.118 CORONARY ARTERY DISEASE OF NATIVE ARTERY OF NATIVE HEART WITH STABLE ANGINA PECTORIS: ICD-10-CM

## 2023-06-27 DIAGNOSIS — F41.9 ANXIETY: ICD-10-CM

## 2023-06-27 DIAGNOSIS — E78.00 HYPERCHOLESTEROLEMIA: ICD-10-CM

## 2023-06-27 DIAGNOSIS — L89.152 SACRAL DECUBITUS ULCER, STAGE II: ICD-10-CM

## 2023-06-27 DIAGNOSIS — I25.110 ATHEROSCLEROSIS OF NATIVE CORONARY ARTERY OF NATIVE HEART WITH UNSTABLE ANGINA PECTORIS: ICD-10-CM

## 2023-06-27 DIAGNOSIS — H91.90 HEARING LOSS, UNSPECIFIED HEARING LOSS TYPE, UNSPECIFIED LATERALITY: Chronic | ICD-10-CM

## 2023-06-27 PROCEDURE — 99214 PR OFFICE/OUTPT VISIT, EST, LEVL IV, 30-39 MIN: ICD-10-PCS | Mod: S$GLB,,, | Performed by: FAMILY MEDICINE

## 2023-06-27 PROCEDURE — 99214 OFFICE O/P EST MOD 30 MIN: CPT | Mod: S$GLB,,, | Performed by: FAMILY MEDICINE

## 2023-06-27 RX ORDER — UMECLIDINIUM 62.5 UG/1
62.5 AEROSOL, POWDER ORAL EVERY MORNING
Qty: 90 EACH | Refills: 3 | Status: SHIPPED | OUTPATIENT
Start: 2023-06-27

## 2023-06-27 RX ORDER — ALLOPURINOL 100 MG/1
50 TABLET ORAL DAILY
Qty: 45 TABLET | Refills: 1 | Status: SHIPPED | OUTPATIENT
Start: 2023-06-27

## 2023-06-27 RX ORDER — IPRATROPIUM BROMIDE AND ALBUTEROL SULFATE 2.5; .5 MG/3ML; MG/3ML
3 SOLUTION RESPIRATORY (INHALATION) EVERY 4 HOURS PRN
Qty: 120 EACH | Refills: 6 | Status: SHIPPED | OUTPATIENT
Start: 2023-06-27 | End: 2024-06-26

## 2023-06-27 RX ORDER — RANOLAZINE 500 MG/1
500 TABLET, EXTENDED RELEASE ORAL 2 TIMES DAILY
Qty: 180 TABLET | Refills: 1 | Status: ON HOLD | OUTPATIENT
Start: 2023-06-27 | End: 2023-08-30 | Stop reason: HOSPADM

## 2023-06-27 RX ORDER — ISOSORBIDE MONONITRATE 120 MG/1
120 TABLET, EXTENDED RELEASE ORAL DAILY
Qty: 90 TABLET | Refills: 1 | Status: ON HOLD | OUTPATIENT
Start: 2023-06-27 | End: 2023-07-29 | Stop reason: HOSPADM

## 2023-06-27 RX ORDER — ALPRAZOLAM 0.25 MG/1
0.25 TABLET ORAL NIGHTLY
Qty: 90 TABLET | Refills: 1 | Status: SHIPPED | OUTPATIENT
Start: 2023-06-27 | End: 2023-11-04

## 2023-06-27 RX ORDER — METOPROLOL TARTRATE 50 MG/1
50 TABLET ORAL 2 TIMES DAILY
Qty: 180 TABLET | Refills: 3 | Status: ON HOLD | OUTPATIENT
Start: 2023-06-27 | End: 2023-07-29 | Stop reason: HOSPADM

## 2023-06-27 RX ORDER — HYDRALAZINE HYDROCHLORIDE 50 MG/1
50 TABLET, FILM COATED ORAL EVERY 12 HOURS
Qty: 60 TABLET | Refills: 0 | Status: ON HOLD
Start: 2023-06-27 | End: 2023-07-23 | Stop reason: HOSPADM

## 2023-06-27 RX ORDER — PANTOPRAZOLE SODIUM 40 MG/1
40 TABLET, DELAYED RELEASE ORAL DAILY
Qty: 90 TABLET | Refills: 3 | Status: SHIPPED | OUTPATIENT
Start: 2023-06-27 | End: 2024-06-26

## 2023-06-27 RX ORDER — IPRATROPIUM BROMIDE AND ALBUTEROL 20; 100 UG/1; UG/1
2 SPRAY, METERED RESPIRATORY (INHALATION) EVERY 4 HOURS PRN
Qty: 12 G | Refills: 3 | Status: SHIPPED | OUTPATIENT
Start: 2023-06-27

## 2023-06-27 RX ORDER — DILTIAZEM HYDROCHLORIDE 120 MG/1
120 CAPSULE, COATED, EXTENDED RELEASE ORAL DAILY
Qty: 90 CAPSULE | Refills: 3 | Status: ON HOLD | OUTPATIENT
Start: 2023-06-27 | End: 2023-07-23 | Stop reason: HOSPADM

## 2023-06-27 NOTE — TELEPHONE ENCOUNTER
----- Message from Kimberly Root sent at 6/27/2023 12:49 PM CDT -----  This patient saw DR. Dwyer today. He wants to see her in 3 months. Monday after 3 or anytime on a Tuesday. Marisol causey's daughter # [phone] GH

## 2023-06-27 NOTE — PROGRESS NOTES
SUBJECTIVE:    Patient ID: Marisol Kerr is a 78 y.o. female.    Chief Complaint: Follow-up (Brought bottles, need refills, unable to take wt and ht ,declined Dexa, abc )    78-year-old female is here for hospital follow-up.  On 06/01/2023 she was admitted to [facility] for respiratory failure and placed on BiPAP support.  She was treated for COPD exacerbation and then was sent to post acute Medical for 2-3 weeks of LTAC treatment    HPI:   77-year-old female history of multivessel CAD with stent on DAPT, very severe COPD, chronic diastolic CHF, chronic hypoxemic respiratory failure on oxygen, obesity, CKD 3, history of cholangitis status post ERCP with sphincterotomy and bile duct stone removal, type 2 diabetes.      Presents to the ER shortness of breath, saturation 78% with paramedics.  Given oxygen DuoNeb EN route.  Has had multiple admissions for respiratory problems in the past.    Hospital Course:      Acute hypoxemic and hypercapnic respiratory failure, likely secondary to CHF, very severe COPD, NSTEMI  -improved, baseline, pending SNF placement  -continue BiPAP at night  -hold Lasix for now, patient has diuresed well, some hypotension.  Consider restarting if hypertensive  - Pulmonary consult appreciated  -patient finished empiric course of antibiotics and steroids for possible pneumonia.   -patient now back on her home oxygen.  Patient discharged to inpatient rehab June 1st.     History of multivessel CAD with stent  Abnormal EKG with ST depressions  -She had left heart catheterization 2/2022 severe triple-vessel disease and had complication with rectus sheet hematoma/bleeding and transferred to [facility] for IR intervention and embolization.  She was deemed too high risk for CABG.  She states that she was told she was too high risk for PCI, they are not planning on doing an angiogram for high risk PCI, and she will be medically managed  - finished 48 hours heparin gtt - now completed  -c/w PTA asa/brilinta      Significant vascular disease within the bilateral lower extremities with stenosis between the common femoral and proximal superficial femoral arteries.  Patient needs to follow up outpatient with vascular surgery.  Ambulatory referral placed.     Patient lives alone but has her daughter and granddaughter come check on her frequently.  She states her sacral area with chronic decubitus feels raw.  She has a wound care nurse practitioner that visits her at home.    She is dependent on 2 to 3.5 L O2 nasal cannula at all times.  She has a pulmonary nebulizer for DuoNeb treatments.  Combivent Respimat inhaler and Incruse inhaler are used.          Admit Date: 5/25/23   Discharge Date: 6/1/23  Discharge Facility: Hospital    Medication Reconciliation:  Medications changed/added/deleted.  Lasix 40 mg daily  New Prescriptions filled after discharge: yes  Discharge summary reviewed:  yes  Pending test results at discharge reviewed:   yes  Follow up appointments scheduled:  yes              with Cardiology   Follow up labs/tests ordered:   no  Home Health ordered on discharge:   yes  Home Health company name: [facility]  DME ordered at discharge:   yes  How patient is feeling since discharge from the hospital?  Patient still maintained on O2 at 3.5 L per nasal cannula.  Continues to get home health wound care     Patient follow up phone call documented on separate encounter.      No results displayed because visit has over 200 results.      Lab Visit on 05/04/2023   Component Date Value Ref Range Status    WBC 05/04/2023 8.02  3.90 - 12.70 K/uL Final    RBC 05/04/2023 3.76 (L)  4.00 - 5.40 M/uL Final    Hemoglobin 05/04/2023 10.2 (L)  12.0 - 16.0 g/dL Final    Hematocrit 05/04/2023 33.9 (L)  37.0 - 48.5 % Final    MCV 05/04/2023 90  82 - 98 fL Final    MCH 05/04/2023 27.1  27.0 - 31.0 pg Final    MCHC 05/04/2023 30.1 (L)  32.0 - 36.0 g/dL Final    RDW 05/04/2023 15.8 (H)  11.5 - 14.5 % Final    Platelets  05/04/2023 240  150 - 450 K/uL Final    MPV 05/04/2023 11.8  9.2 - 12.9 fL Final    Immature Granulocytes 05/04/2023 0.4  0.0 - 0.5 % Final    Gran # (ANC) 05/04/2023 6.6  1.8 - 7.7 K/uL Final    Immature Grans (Abs) 05/04/2023 0.03  0.00 - 0.04 K/uL Final    Lymph # 05/04/2023 0.8 (L)  1.0 - 4.8 K/uL Final    Mono # 05/04/2023 0.5  0.3 - 1.0 K/uL Final    Eos # 05/04/2023 0.1  0.0 - 0.5 K/uL Final    Baso # 05/04/2023 0.04  0.00 - 0.20 K/uL Final    nRBC 05/04/2023 0  0 /100 WBC Final    Gran % 05/04/2023 82.5 (H)  38.0 - 73.0 % Final    Lymph % 05/04/2023 10.2 (L)  18.0 - 48.0 % Final    Mono % 05/04/2023 5.7  4.0 - 15.0 % Final    Eosinophil % 05/04/2023 0.7  0.0 - 8.0 % Final    Basophil % 05/04/2023 0.5  0.0 - 1.9 % Final    Differential Method 05/04/2023 Automated   Final    Sodium 05/04/2023 139  136 - 145 mmol/L Final    Potassium 05/04/2023 4.8  3.5 - 5.1 mmol/L Final    Chloride 05/04/2023 102  95 - 110 mmol/L Final    CO2 05/04/2023 24  23 - 29 mmol/L Final    Glucose 05/04/2023 120 (H)  70 - 110 mg/dL Final    BUN 05/04/2023 34 (H)  8 - 23 mg/dL Final    Creatinine 05/04/2023 2.2 (H)  0.5 - 1.4 mg/dL Final    Calcium 05/04/2023 9.0  8.7 - 10.5 mg/dL Final    Total Protein 05/04/2023 6.0  6.0 - 8.4 g/dL Final    Albumin 05/04/2023 2.8 (L)  3.5 - 5.2 g/dL Final    Total Bilirubin 05/04/2023 0.4  0.1 - 1.0 mg/dL Final    Alkaline Phosphatase 05/04/2023 111  55 - 135 U/L Final    AST 05/04/2023 13  10 - 40 U/L Final    ALT 05/04/2023 9 (L)  10 - 44 U/L Final    Anion Gap 05/04/2023 13  8 - 16 mmol/L Final    eGFR 05/04/2023 23 (A)  >60 mL/min/1.73 m^2 Final    CRP 05/04/2023 38.7 (H)  0.0 - 8.2 mg/L Final    Sed Rate 05/04/2023 61 (H)  0 - 20 mm/Hr Final    Hemoglobin A1C 05/04/2023 4.9  4.0 - 5.6 % Final    Estimated Avg Glucose 05/04/2023 94  68 - 131 mg/dL Final    Prealbumin 05/04/2023 17 (L)  20 - 43 mg/dL Final   Lab Visit on 04/06/2023   Component Date Value Ref Range Status    WBC 04/06/2023 10.23   3.90 - 12.70 K/uL Final    RBC 04/06/2023 4.07  4.00 - 5.40 M/uL Final    Hemoglobin 04/06/2023 11.0 (L)  12.0 - 16.0 g/dL Final    Hematocrit 04/06/2023 37.6  37.0 - 48.5 % Final    MCV 04/06/2023 92  82 - 98 fL Final    MCH 04/06/2023 27.0  27.0 - 31.0 pg Final    MCHC 04/06/2023 29.3 (L)  32.0 - 36.0 g/dL Final    RDW 04/06/2023 14.9 (H)  11.5 - 14.5 % Final    Platelets 04/06/2023 251  150 - 450 K/uL Final    MPV 04/06/2023 12.5  9.2 - 12.9 fL Final    Immature Granulocytes 04/06/2023 0.3  0.0 - 0.5 % Final    Gran # (ANC) 04/06/2023 8.9 (H)  1.8 - 7.7 K/uL Final    Immature Grans (Abs) 04/06/2023 0.03  0.00 - 0.04 K/uL Final    Lymph # 04/06/2023 0.8 (L)  1.0 - 4.8 K/uL Final    Mono # 04/06/2023 0.4  0.3 - 1.0 K/uL Final    Eos # 04/06/2023 0.1  0.0 - 0.5 K/uL Final    Baso # 04/06/2023 0.06  0.00 - 0.20 K/uL Final    nRBC 04/06/2023 0  0 /100 WBC Final    Gran % 04/06/2023 86.6 (H)  38.0 - 73.0 % Final    Lymph % 04/06/2023 8.2 (L)  18.0 - 48.0 % Final    Mono % 04/06/2023 3.5 (L)  4.0 - 15.0 % Final    Eosinophil % 04/06/2023 0.8  0.0 - 8.0 % Final    Basophil % 04/06/2023 0.6  0.0 - 1.9 % Final    Differential Method 04/06/2023 Automated   Final    Sodium 04/06/2023 139  136 - 145 mmol/L Final    Potassium 04/06/2023 4.7  3.5 - 5.1 mmol/L Final    Chloride 04/06/2023 105  95 - 110 mmol/L Final    CO2 04/06/2023 26  23 - 29 mmol/L Final    Glucose 04/06/2023 144 (H)  70 - 110 mg/dL Final    BUN 04/06/2023 29 (H)  8 - 23 mg/dL Final    Creatinine 04/06/2023 1.9 (H)  0.5 - 1.4 mg/dL Final    Calcium 04/06/2023 9.0  8.7 - 10.5 mg/dL Final    Total Protein 04/06/2023 6.4  6.0 - 8.4 g/dL Final    Albumin 04/06/2023 2.7 (L)  3.5 - 5.2 g/dL Final    Total Bilirubin 04/06/2023 0.4  0.1 - 1.0 mg/dL Final    Alkaline Phosphatase 04/06/2023 79  55 - 135 U/L Final    AST 04/06/2023 13  10 - 40 U/L Final    ALT 04/06/2023 9 (L)  10 - 44 U/L Final    Anion Gap 04/06/2023 8  8 - 16 mmol/L Final    eGFR 04/06/2023 27 (A)   >60 mL/min/1.73 m^2 Final   No results displayed because visit has over 200 results.      No results displayed because visit has over 200 results.      Admission on 01/09/2023, Discharged on 01/10/2023   Component Date Value Ref Range Status    Lipase 01/09/2023 41  4 - 60 U/L Final    Benzodiazepines 01/09/2023 Presumptive Positive (A)  Negative Final    Cocaine (Metab.) 01/09/2023 Negative  Negative Final    Opiate Scrn, Ur 01/09/2023 Negative  Negative Final    Barbiturate Screen, Ur 01/09/2023 Negative  Negative Final    Amphetamine Screen, Ur 01/09/2023 Negative  Negative Final    THC 01/09/2023 Negative  Negative Final    Phencyclidine 01/09/2023 Negative  Negative Final    Creatinine, Urine 01/09/2023 37.0  15.0 - 325.0 mg/dL Final    Toxicology Information 01/09/2023 SEE COMMENT   Final    Specimen UA 01/09/2023 Urine, Clean Catch   Final    Color, UA 01/09/2023 Yellow  Yellow, Straw, [place] Final    Appearance, UA 01/09/2023 Hazy (A)  Clear Final    pH, UA 01/09/2023 5.0  5.0 - 8.0 Final    Specific Gravity, UA 01/09/2023 1.010  1.005 - 1.030 Final    Protein, UA 01/09/2023 1+ (A)  Negative Final    Glucose, UA 01/09/2023 Negative  Negative Final    Ketones, UA 01/09/2023 Negative  Negative Final    Bilirubin (UA) 01/09/2023 Negative  Negative Final    Occult Blood UA 01/09/2023 Trace (A)  Negative Final    Nitrite, UA 01/09/2023 Negative  Negative Final    Urobilinogen, UA 01/09/2023 Negative  Negative EU/dL Final    Leukocytes, UA 01/09/2023 Negative  Negative Final    CPK 01/09/2023 103  20 - 180 U/L Final    Magnesium 01/09/2023 1.9  1.6 - 2.6 mg/dL Final    TSH 01/09/2023 0.270 (L)  0.340 - 5.600 uIU/mL Final    WBC 01/09/2023 11.52  3.90 - 12.70 K/uL Final    RBC 01/09/2023 4.00  4.00 - 5.40 M/uL Final    Hemoglobin 01/09/2023 11.7 (L)  12.0 - 16.0 g/dL Final    Hematocrit 01/09/2023 38.3  37.0 - 48.5 % Final    MCV 01/09/2023 96  82 - 98 fL Final    MCH 01/09/2023 29.3  27.0 - 31.0 pg Final    MCHC  01/09/2023 30.5 (L)  32.0 - 36.0 g/dL Final    RDW 01/09/2023 12.7  11.5 - 14.5 % Final    Platelets 01/09/2023 226  150 - 450 K/uL Final    MPV 01/09/2023 11.0  9.2 - 12.9 fL Final    Immature Granulocytes 01/09/2023 1.4 (H)  0.0 - 0.5 % Final    Gran # (ANC) 01/09/2023 9.4 (H)  1.8 - 7.7 K/uL Final    Immature Grans (Abs) 01/09/2023 0.16 (H)  0.00 - 0.04 K/uL Final    Lymph # 01/09/2023 1.0  1.0 - 4.8 K/uL Final    Mono # 01/09/2023 0.8  0.3 - 1.0 K/uL Final    Eos # 01/09/2023 0.1  0.0 - 0.5 K/uL Final    Baso # 01/09/2023 0.07  0.00 - 0.20 K/uL Final    nRBC 01/09/2023 0  0 /100 WBC Final    Gran % 01/09/2023 81.8 (H)  38.0 - 73.0 % Final    Lymph % 01/09/2023 8.7 (L)  18.0 - 48.0 % Final    Mono % 01/09/2023 6.7  4.0 - 15.0 % Final    Eosinophil % 01/09/2023 0.8  0.0 - 8.0 % Final    Basophil % 01/09/2023 0.6  0.0 - 1.9 % Final    Differential Method 01/09/2023 Automated   Final    Sodium 01/09/2023 139  136 - 145 mmol/L Final    Potassium 01/09/2023 4.1  3.5 - 5.1 mmol/L Final    Chloride 01/09/2023 102  95 - 110 mmol/L Final    CO2 01/09/2023 28  23 - 29 mmol/L Final    Glucose 01/09/2023 122 (H)  70 - 110 mg/dL Final    BUN 01/09/2023 27 (H)  8 - 23 mg/dL Final    Creatinine 01/09/2023 1.4  0.5 - 1.4 mg/dL Final    Calcium 01/09/2023 8.9  8.7 - 10.5 mg/dL Final    Total Protein 01/09/2023 6.9  6.0 - 8.4 g/dL Final    Albumin 01/09/2023 3.1 (L)  3.5 - 5.2 g/dL Final    Total Bilirubin 01/09/2023 0.4  0.1 - 1.0 mg/dL Final    Alkaline Phosphatase 01/09/2023 75  55 - 135 U/L Final    AST 01/09/2023 20  10 - 40 U/L Final    ALT 01/09/2023 14  10 - 44 U/L Final    Anion Gap 01/09/2023 9  8 - 16 mmol/L Final    eGFR 01/09/2023 39.2 (A)  >60 mL/min/1.73 m^2 Final    Troponin I High Sensitivity 01/09/2023 23.0 (H)  0.0 - 14.9 pg/mL Final    Troponin I High Sensitivity 01/09/2023 23.3 (H)  0.0 - 14.9 pg/mL Final    BNP 01/09/2023 232 (H)  0 - 99 pg/mL Final    SARS-CoV-2 RNA, Amplification, Qual 01/09/2023 Negative   Negative Final    Influenza A, Molecular 01/09/2023 Negative  Negative Final    Influenza B, Molecular 01/09/2023 Negative  Negative Final    Flu A & B Source 01/09/2023 Nasal swab   Final    Free T4 01/09/2023 0.97  0.71 - 1.51 ng/dL Final    RBC, UA 01/09/2023 4  0 - 4 /hpf Final    WBC, UA 01/09/2023 4  0 - 5 /hpf Final    Bacteria 01/09/2023 Occasional  None-Occ /hpf Final    Squam Epithel, UA 01/09/2023 3  /hpf Final    Hyaline Casts, UA 01/09/2023 5 (A)  0-1/lpf /lpf Final    Microscopic Comment 01/09/2023 SEE COMMENT   Final    Procalcitonin 01/09/2023 0.25  0.00 - 0.50 ng/mL Final    Sodium 01/10/2023 142  136 - 145 mmol/L Final    Potassium 01/10/2023 4.5  3.5 - 5.1 mmol/L Final    Chloride 01/10/2023 100  95 - 110 mmol/L Final    CO2 01/10/2023 33 (H)  23 - 29 mmol/L Final    Glucose 01/10/2023 180 (H)  70 - 110 mg/dL Final    BUN 01/10/2023 26 (H)  8 - 23 mg/dL Final    Creatinine 01/10/2023 1.4  0.5 - 1.4 mg/dL Final    Calcium 01/10/2023 9.5  8.7 - 10.5 mg/dL Final    Total Protein 01/10/2023 7.1  6.0 - 8.4 g/dL Final    Albumin 01/10/2023 3.1 (L)  3.5 - 5.2 g/dL Final    Total Bilirubin 01/10/2023 0.5  0.1 - 1.0 mg/dL Final    Alkaline Phosphatase 01/10/2023 81  55 - 135 U/L Final    AST 01/10/2023 22  10 - 40 U/L Final    ALT 01/10/2023 16  10 - 44 U/L Final    Anion Gap 01/10/2023 9  8 - 16 mmol/L Final    eGFR 01/10/2023 39.2 (A)  >60 mL/min/1.73 m^2 Final    Magnesium 01/10/2023 1.9  1.6 - 2.6 mg/dL Final    WBC 01/10/2023 8.25  3.90 - 12.70 K/uL Final    RBC 01/10/2023 4.05  4.00 - 5.40 M/uL Final    Hemoglobin 01/10/2023 12.0  12.0 - 16.0 g/dL Final    Hematocrit 01/10/2023 38.8  37.0 - 48.5 % Final    MCV 01/10/2023 96  82 - 98 fL Final    MCH 01/10/2023 29.6  27.0 - 31.0 pg Final    MCHC 01/10/2023 30.9 (L)  32.0 - 36.0 g/dL Final    RDW 01/10/2023 12.4  11.5 - 14.5 % Final    Platelets 01/10/2023 210  150 - 450 K/uL Final    MPV 01/10/2023 11.3  9.2 - 12.9 fL Final    Immature Granulocytes  01/10/2023 4.0 (H)  0.0 - 0.5 % Final    Gran # (ANC) 01/10/2023 7.1  1.8 - 7.7 K/uL Final    Immature Grans (Abs) 01/10/2023 0.33 (H)  0.00 - 0.04 K/uL Final    Lymph # 01/10/2023 0.7 (L)  1.0 - 4.8 K/uL Final    Mono # 01/10/2023 0.1 (L)  0.3 - 1.0 K/uL Final    Eos # 01/10/2023 0.0  0.0 - 0.5 K/uL Final    Baso # 01/10/2023 0.02  0.00 - 0.20 K/uL Final    nRBC 01/10/2023 0  0 /100 WBC Final    Gran % 01/10/2023 86.6 (H)  38.0 - 73.0 % Final    Lymph % 01/10/2023 8.5 (L)  18.0 - 48.0 % Final    Mono % 01/10/2023 0.7 (L)  4.0 - 15.0 % Final    Eosinophil % 01/10/2023 0.0  0.0 - 8.0 % Final    Basophil % 01/10/2023 0.2  0.0 - 1.9 % Final    Platelet Estimate 01/10/2023 Clumped (A)   Final    Differential Method 01/10/2023 Automated   Final   Lab Visit on 01/06/2023   Component Date Value Ref Range Status    WBC 01/06/2023 11.92  3.90 - 12.70 K/uL Final    RBC 01/06/2023 4.07  4.00 - 5.40 M/uL Final    Hemoglobin 01/06/2023 12.0  12.0 - 16.0 g/dL Final    Hematocrit 01/06/2023 40.2  37.0 - 48.5 % Final    MCV 01/06/2023 99 (H)  82 - 98 fL Final    MCH 01/06/2023 29.5  27.0 - 31.0 pg Final    MCHC 01/06/2023 29.9 (L)  32.0 - 36.0 g/dL Final    RDW 01/06/2023 13.1  11.5 - 14.5 % Final    Platelets 01/06/2023 206  150 - 450 K/uL Final    MPV 01/06/2023 12.1  9.2 - 12.9 fL Final    Immature Granulocytes 01/06/2023 0.3  0.0 - 0.5 % Final    Gran # (ANC) 01/06/2023 10.1 (H)  1.8 - 7.7 K/uL Final    Immature Grans (Abs) 01/06/2023 0.04  0.00 - 0.04 K/uL Final    Lymph # 01/06/2023 0.8 (L)  1.0 - 4.8 K/uL Final    Mono # 01/06/2023 0.9  0.3 - 1.0 K/uL Final    Eos # 01/06/2023 0.1  0.0 - 0.5 K/uL Final    Baso # 01/06/2023 0.04  0.00 - 0.20 K/uL Final    nRBC 01/06/2023 0  0 /100 WBC Final    Gran % 01/06/2023 84.8 (H)  38.0 - 73.0 % Final    Lymph % 01/06/2023 6.3 (L)  18.0 - 48.0 % Final    Mono % 01/06/2023 7.5  4.0 - 15.0 % Final    Eosinophil % 01/06/2023 0.8  0.0 - 8.0 % Final    Basophil % 01/06/2023 0.3  0.0 - 1.9 %  Final    Differential Method 01/06/2023 Automated   Final    Sodium 01/06/2023 138  136 - 145 mmol/L Final    Potassium 01/06/2023 3.9  3.5 - 5.1 mmol/L Final    Chloride 01/06/2023 97  95 - 110 mmol/L Final    CO2 01/06/2023 31 (H)  23 - 29 mmol/L Final    Glucose 01/06/2023 117 (H)  70 - 110 mg/dL Final    BUN 01/06/2023 32 (H)  8 - 23 mg/dL Final    Creatinine 01/06/2023 1.6 (H)  0.5 - 1.4 mg/dL Final    Calcium 01/06/2023 9.5  8.7 - 10.5 mg/dL Final    Total Protein 01/06/2023 7.0  6.0 - 8.4 g/dL Final    Albumin 01/06/2023 3.5  3.5 - 5.2 g/dL Final    Total Bilirubin 01/06/2023 0.6  0.1 - 1.0 mg/dL Final    Alkaline Phosphatase 01/06/2023 94  55 - 135 U/L Final    AST 01/06/2023 14  10 - 40 U/L Final    ALT 01/06/2023 7 (L)  10 - 44 U/L Final    Anion Gap 01/06/2023 10  8 - 16 mmol/L Final    eGFR 01/06/2023 33.4 (A)  >60 mL/min/1.73 m^2 Final    CRP 01/06/2023 135.5 (H)  0.0 - 8.2 mg/L Final    Sed Rate 01/06/2023 52 (H)  0 - 20 mm/Hr Final    Hemoglobin A1C 01/06/2023 4.8  4.0 - 5.6 % Final    Estimated Avg Glucose 01/06/2023 91  68 - 131 mg/dL Final    Prealbumin 01/06/2023 17 (L)  20 - 43 mg/dL Final       Past Medical History:   Diagnosis Date    CAD (coronary artery disease)     Cancer     colon    CHF (congestive heart failure)     Colitis     Colon cancer     COPD (chronic obstructive pulmonary disease)     Decreased hearing     Diabetes mellitus     Diabetes mellitus, type 2     Hypercholesterolemia     Hypertension     Hypoxemia     Insomnia     Obesity     Osteoarthritis      Past Surgical History:   Procedure Laterality Date    ANGIOGRAM, CORONARY, WITH LEFT HEART CATHETERIZATION N/A 2/10/2022    Procedure: Angiogram, Coronary, with Left Heart Cath;  Surgeon: Cristian Benjamin MD;  Location: [facility] CATH/EP LAB;  Service: Cardiology;  Laterality: N/A;    CARDIAC CATHETERIZATION      CHOLECYSTECTOMY      COLECTOMY      CORONARY ANGIOPLASTY      CORONARY STENT PLACEMENT      ERCP N/A 1/16/2023     Procedure: ERCP (ENDOSCOPIC RETROGRADE CHOLANGIOPANCREATOGRAPHY);  Surgeon: Biju Kramer III, MD;  Location: [facility] ENDO;  Service: Endoscopy;  Laterality: N/A;    ESOPHAGOGASTRODUODENOSCOPY N/A 1/29/2023    Procedure: EGD (ESOPHAGOGASTRODUODENOSCOPY);  Surgeon: Aarti Alvarez MD;  Location: [facility] ENDO;  Service: Endoscopy;  Laterality: N/A;    EXTERNAL EAR SURGERY      EYE SURGERY      FLEXIBLE SIGMOIDOSCOPY N/A 9/19/2019    Procedure: SIGMOIDOSCOPY, FLEXIBLE;  Surgeon: Biju Kramer III, MD;  Location: [facility] ENDO;  Service: Endoscopy;  Laterality: N/A;    HYSTERECTOMY      partial     Family History   Problem Relation Age of Onset    Febrile seizures Mother     Heart failure Father        Marital Status:   Alcohol History:  reports current alcohol use.  Tobacco History:  reports that she quit smoking about 17 years ago. Her smoking use included cigarettes. She started smoking about 59 years ago. She has a 150.00 pack-year smoking history. She has never used smokeless tobacco.  Drug History:  reports no history of drug use.    Review of patient's allergies indicates:   Allergen Reactions    Iodinated contrast media     Strawberries [strawberry] Hives and Itching       Current Outpatient Medications:     albuterol (VENTOLIN HFA) 90 mcg/actuation inhaler, Inhale 2 puffs into the lungs every 6 (six) hours as needed for Wheezing or Shortness of Breath. Rescue, Disp: 36 g, Rfl: 3    cholecalciferol, vitamin D3, 125 mcg (5,000 unit) Tab, Take 5,000 Units by mouth once daily., Disp: , Rfl:     cholestyramine (QUESTRAN) 4 gram packet, Take 1 packet (4 g total) by mouth 2 (two) times daily., Disp: 180 packet, Rfl: 3    coenzyme Q10 100 mg capsule, Take 100 mg by mouth every morning., Disp: , Rfl:     ergocalciferol (VITAMIN D2) 50,000 unit Cap, Take 1 capsule (50,000 Units total) by mouth every 7 days., Disp: 12 capsule, Rfl: 1    ferrous sulfate 325 (65 FE) MG EC tablet, Take 325 mg by mouth once  daily., Disp: , Rfl:     fish oil-omega-3 fatty acids 300-1,000 mg capsule, Take 2 capsules by mouth every morning., Disp: , Rfl:     mupirocin (BACTROBAN) 2 % ointment, Apply topically 2 (two) times daily., Disp: 30 g, Rfl: 3    nebulizer and compressor Reshma, as directed, Disp: 1 each, Rfl: 0    nitroGLYCERIN 0.4 MG/DOSE TL SPRY (NITROLINGUAL) 400 mcg/spray spray, Place 1 spray under the tongue every 5 (five) minutes as needed for Chest pain (max of 3 doses)., Disp: 4.9 g, Rfl: 1    ondansetron (ZOFRAN-ODT) 4 MG TbDL, Take 1 tablet (4 mg total) by mouth every 6 (six) hours as needed (nausea)., Disp: 30 tablet, Rfl: 0    polycarbophil (FIBERCON) 625 mg tablet, Take 625 mg by mouth once daily., Disp: , Rfl:     ticagrelor (BRILINTA) 90 mg tablet, Take 1 tablet (90 mg total) by mouth every 12 (twelve) hours., Disp: 60 tablet, Rfl: 5    vit C/E/Zn/coppr/lutein/zeaxan (PRESERVISION AREDS-2 ORAL), Take 1 tablet by mouth once daily., Disp: , Rfl:     albuterol-ipratropium (DUO-NEB) 2.5 mg-0.5 mg/3 mL nebulizer solution, Take 3 mLs by nebulization every 4 (four) hours as needed for Wheezing or Shortness of Breath. Rescue, Disp: 120 each, Rfl: 6    allopurinoL (ZYLOPRIM) 100 MG tablet, Take 0.5 tablets (50 mg total) by mouth once daily., Disp: 45 tablet, Rfl: 1    ALPRAZolam (XANAX) 0.25 MG tablet, Take 1 tablet (0.25 mg total) by mouth every evening., Disp: 90 tablet, Rfl: 1    aspirin (ECOTRIN) 81 MG EC tablet, Take 1 tablet (81 mg total) by mouth every morning., Disp: 90 tablet, Rfl: 3    atorvastatin (LIPITOR) 40 MG tablet, Take 1 tablet (40 mg total) by mouth once daily., Disp: 90 tablet, Rfl: 3    diltiaZEM (CARDIZEM CD) 120 MG Cp24, Take 1 capsule (120 mg total) by mouth once daily., Disp: 90 capsule, Rfl: 3    furosemide (LASIX) 40 MG tablet, Take 1 tablet (40 mg total) by mouth once daily., Disp: 30 tablet, Rfl: 0    hydrALAZINE (APRESOLINE) 50 MG tablet, Take 1 tablet (50 mg total) by mouth every 12 (twelve)  hours., Disp: 60 tablet, Rfl: 0    ipratropium-albuteroL (COMBIVENT RESPIMAT)  mcg/actuation inhaler, Inhale 2 puffs into the lungs every 4 (four) hours as needed for Shortness of Breath. Rescue, Disp: 12 g, Rfl: 3    isosorbide mononitrate (IMDUR) 120 MG 24 hr tablet, Take 1 tablet (120 mg total) by mouth once daily., Disp: 90 tablet, Rfl: 1    metoprolol tartrate (LOPRESSOR) 50 MG tablet, Take 1 tablet (50 mg total) by mouth 2 (two) times daily., Disp: 180 tablet, Rfl: 3    pantoprazole (PROTONIX) 40 MG tablet, Take 1 tablet (40 mg total) by mouth once daily., Disp: 90 tablet, Rfl: 3    ranolazine (RANEXA) 500 MG Tb12, Take 1 tablet (500 mg total) by mouth 2 (two) times daily., Disp: 180 tablet, Rfl: 1    umeclidinium (INCRUSE ELLIPTA) 62.5 mcg/actuation inhalation capsule, Inhale 62.5 mcg into the lungs every morning. Controller, Disp: 90 each, Rfl: 3    Review of Systems   Constitutional:  Negative for appetite change, chills, fatigue, fever and unexpected weight change.   HENT:  Negative for ear discharge and ear pain.    Eyes:  Negative for pain, discharge and visual disturbance.   Respiratory:  Positive for shortness of breath and wheezing. Negative for apnea and cough.    Cardiovascular:  Negative for chest pain, palpitations and leg swelling.   Gastrointestinal:  Negative for abdominal pain, blood in stool, constipation, diarrhea, nausea, vomiting and reflux.   Endocrine: Negative for cold intolerance, heat intolerance and polydipsia.   Genitourinary:  Negative for bladder incontinence, dysuria, hematuria, nocturia and urgency.   Musculoskeletal:  Negative for gait problem, joint swelling and myalgias.   Neurological:  Negative for dizziness, seizures and numbness.   Psychiatric/Behavioral:  Negative for behavioral problems and hallucinations. The patient is not nervous/anxious.        Objective:      Vitals:    06/27/23 1204   BP: 90/60   Pulse: 60     Physical Exam  Vitals and nursing note reviewed.    Constitutional:       General: She is not in acute distress.     Appearance: She is well-developed. She is obese. She is not toxic-appearing.   HENT:      Head: Normocephalic and atraumatic.      Right Ear: Tympanic membrane and external ear normal.      Left Ear: Tympanic membrane and external ear normal.      Nose: Nose normal.      Mouth/Throat:      Pharynx: Oropharynx is clear.   Eyes:      Pupils: Pupils are equal, round, and reactive to light.   Neck:      Thyroid: No thyromegaly.      Vascular: No carotid bruit.   Cardiovascular:      Rate and Rhythm: Normal rate and regular rhythm.      Heart sounds: Normal heart sounds. No murmur heard.  Pulmonary:      Effort: Pulmonary effort is normal.      Breath sounds: Normal breath sounds. No wheezing or rales.      Comments: On 2 L nasal cannula oxygen  Abdominal:      General: Bowel sounds are normal. There is no distension.      Palpations: Abdomen is soft.      Tenderness: There is no abdominal tenderness.   Musculoskeletal:         General: No tenderness or deformity. Normal range of motion.      Cervical back: Normal range of motion and neck supple.      Lumbar back: Normal. No spasms.      Comments: Knees have good range of motion no pitting edema to lower extremities, she is in a wheelchair and is not able to walk.   Lymphadenopathy:      Cervical: No cervical adenopathy.   Skin:     General: Skin is warm and dry.      Findings: No rash.      Comments: Patient has a scarred up sacrum with a pinpoint hole remaining from the decubitus, no suppurative drainage noted   Neurological:      Mental Status: She is alert and oriented to person, place, and time.      Cranial Nerves: No cranial nerve deficit.      Coordination: Coordination normal.   Psychiatric:         Mood and Affect: Mood normal.         Behavior: Behavior normal.         Thought Content: Thought content normal.         Judgment: Judgment normal.       Assessment:       1. Chronic hypoxemic  respiratory failure    2. Coronary artery disease of native artery of native heart with stable angina pectoris    3. Gout, unspecified cause, unspecified chronicity, unspecified site    4. Gastroesophageal reflux disease without esophagitis    5. Anxiety    6. Diabetic peripheral neuropathy    7. Hearing loss, unspecified hearing loss type, unspecified laterality    8. Chronic obstructive pulmonary disease, unspecified COPD type    9. COPD exacerbation    10. Acute on chronic respiratory failure with hypoxia and hypercapnia    11. Venous stasis    12. Hypercholesterolemia    13. Essential hypertension, benign    14. Atherosclerosis of native coronary artery of native heart with unstable angina pectoris    15. Primary hypertension    16. Congestive heart failure with left ventricular diastolic dysfunction, acute on chronic    17. DM type 2, controlled, with complication    18. Primary osteoarthritis of both hips    19. Sacral decubitus ulcer, stage II    20. Debility    21. Hospital discharge follow-up         Plan:       Chronic hypoxemic respiratory failure  Comments:  Stable on continuous O2 2-3.5 l/min  Orders:  -     albuterol-ipratropium (DUO-NEB) 2.5 mg-0.5 mg/3 mL nebulizer solution; Take 3 mLs by nebulization every 4 (four) hours as needed for Wheezing or Shortness of Breath. Rescue  Dispense: 120 each; Refill: 6  -     ipratropium-albuteroL (COMBIVENT RESPIMAT)  mcg/actuation inhaler; Inhale 2 puffs into the lungs every 4 (four) hours as needed for Shortness of Breath. Rescue  Dispense: 12 g; Refill: 3  -     umeclidinium (INCRUSE ELLIPTA) 62.5 mcg/actuation inhalation capsule; Inhale 62.5 mcg into the lungs every morning. Controller  Dispense: 90 each; Refill: 3  -     SUBSEQUENT HOME HEALTH ORDERS  Will request CBC CMP in 2 weeks from home health.  Coronary artery disease of native artery of native heart with stable angina pectoris  -     diltiaZEM (CARDIZEM CD) 120 MG Cp24; Take 1 capsule (120 mg  total) by mouth once daily.  Dispense: 90 capsule; Refill: 3  -     isosorbide mononitrate (IMDUR) 120 MG 24 hr tablet; Take 1 tablet (120 mg total) by mouth once daily.  Dispense: 90 tablet; Refill: 1  -     ranolazine (RANEXA) 500 MG Tb12; Take 1 tablet (500 mg total) by mouth 2 (two) times daily.  Dispense: 180 tablet; Refill: 1  -     CBC Auto Differential; Future; Expected date: 06/27/2023  -     Comprehensive Metabolic Panel; Future; Expected date: 06/27/2023  -     SUBSEQUENT HOME HEALTH ORDERS  Blood pressure well controlled, somewhat low today.  Gout, unspecified cause, unspecified chronicity, unspecified site  -     allopurinoL (ZYLOPRIM) 100 MG tablet; Take 0.5 tablets (50 mg total) by mouth once daily.  Dispense: 45 tablet; Refill: 1    Gastroesophageal reflux disease without esophagitis  -     pantoprazole (PROTONIX) 40 MG tablet; Take 1 tablet (40 mg total) by mouth once daily.  Dispense: 90 tablet; Refill: 3    Anxiety  -     ALPRAZolam (XANAX) 0.25 MG tablet; Take 1 tablet (0.25 mg total) by mouth every evening.  Dispense: 90 tablet; Refill: 1  Refill Xanax.  Diabetic peripheral neuropathy    Hearing loss, unspecified hearing loss type, unspecified laterality    Chronic obstructive pulmonary disease, unspecified COPD type  Patient has severe end-stage COPD.  Prognosis long-term is poor.  COPD exacerbation    Acute on chronic respiratory failure with hypoxia and hypercapnia    Venous stasis    Hypercholesterolemia    Essential hypertension, benign    Atherosclerosis of native coronary artery of native heart with unstable angina pectoris    Primary hypertension    Congestive heart failure with left ventricular diastolic dysfunction, acute on chronic    DM type 2, controlled, with complication    Primary osteoarthritis of both hips    Sacral decubitus ulcer, stage II  Sacrum has a deep small hole.  Will continue local wound dressings.  Debility    Hospital discharge follow-up  Hospital medication  reconciled home medications  Other orders  -     metoprolol tartrate (LOPRESSOR) 50 MG tablet; Take 1 tablet (50 mg total) by mouth 2 (two) times daily.  Dispense: 180 tablet; Refill: 3  -     hydrALAZINE (APRESOLINE) 50 MG tablet; Take 1 tablet (50 mg total) by mouth every 12 (twelve) hours.  Dispense: 60 tablet; Refill: 0      No follow-ups on file.

## 2023-06-27 NOTE — TELEPHONE ENCOUNTER
Spoke with Natalie the  nurse, she states the patient told her Dr. Dwyer d/[place] the alginate on her bottom - she just wants verbal auth. Also needs an updated med list, wants to verify the patients Lasix dosage. Looks like she brought bottles ot the visit today but lasix hasn't been refilled since January so she just wanted to confirm she is taking 40mg daily.        F [phone]    P [phone]

## 2023-06-27 NOTE — TELEPHONE ENCOUNTER
"Okay per Dr Dwyer to d/c alginate, Yes lasix is 40mg daily." Orders and current med list faxed to number below.   "

## 2023-06-27 NOTE — TELEPHONE ENCOUNTER
----- Message from Anni López sent at 6/27/2023  3:29 PM CDT -----  FYI : [facility] health called and wanted to speak to the nurse. She stated that she want to ask her about the patient medicine and what the doctor advise the patient of. Called [place] and advised and put the call through. No patient call back needed.

## 2023-06-29 ENCOUNTER — TELEPHONE (OUTPATIENT)
Dept: FAMILY MEDICINE | Facility: CLINIC | Age: 78
End: 2023-06-29

## 2023-07-11 NOTE — PLAN OF CARE
Problem: Adult Inpatient Plan of Care  Goal: Plan of Care Review  Outcome: Ongoing, Progressing     Problem: Adult Inpatient Plan of Care  Goal: Patient-Specific Goal (Individualized)  Outcome: Ongoing, Progressing     Problem: Adult Inpatient Plan of Care  Goal: Absence of Hospital-Acquired Illness or Injury  Outcome: Ongoing, Progressing     Problem: Adult Inpatient Plan of Care  Goal: Optimal Comfort and Wellbeing  Outcome: Ongoing, Progressing     Problem: Adult Inpatient Plan of Care  Goal: Readiness for Transition of Care  Outcome: Ongoing, Progressing     Problem: Diabetes Comorbidity  Goal: Blood Glucose Level Within Targeted Range  Outcome: Ongoing, Progressing     Problem: Impaired Wound Healing  Goal: Optimal Wound Healing  Outcome: Ongoing, Progressing      Is Xerosis Present?: Yes - Normal Treatment

## 2023-07-14 ENCOUNTER — EXTERNAL HOME HEALTH (OUTPATIENT)
Dept: HOME HEALTH SERVICES | Facility: HOSPITAL | Age: 78
End: 2023-07-14
Payer: MEDICARE

## 2023-07-14 ENCOUNTER — LAB VISIT (OUTPATIENT)
Dept: LAB | Facility: HOSPITAL | Age: 78
End: 2023-07-14
Attending: FAMILY MEDICINE
Payer: MEDICARE

## 2023-07-14 DIAGNOSIS — E11.22 TYPE 2 DIABETES MELLITUS WITH END-STAGE RENAL DISEASE: Primary | ICD-10-CM

## 2023-07-14 DIAGNOSIS — I13.0 HYPERTENSIVE HEART AND RENAL DISEASE WITH CONGESTIVE HEART FAILURE: ICD-10-CM

## 2023-07-14 DIAGNOSIS — N18.6 TYPE 2 DIABETES MELLITUS WITH END-STAGE RENAL DISEASE: Primary | ICD-10-CM

## 2023-07-14 DIAGNOSIS — I25.10 CORONARY ATHEROSCLEROSIS OF NATIVE CORONARY ARTERY: ICD-10-CM

## 2023-07-14 DIAGNOSIS — I50.32 CHRONIC DIASTOLIC HEART FAILURE: ICD-10-CM

## 2023-07-14 LAB
ALBUMIN SERPL BCP-MCNC: 2.7 G/DL (ref 3.5–5.2)
ALP SERPL-CCNC: 78 U/L (ref 55–135)
ALT SERPL W/O P-5'-P-CCNC: 10 U/L (ref 10–44)
ANION GAP SERPL CALC-SCNC: 10 MMOL/L (ref 8–16)
AST SERPL-CCNC: 21 U/L (ref 10–40)
BASOPHILS # BLD AUTO: 0.04 K/UL (ref 0–0.2)
BASOPHILS NFR BLD: 0.5 % (ref 0–1.9)
BILIRUB SERPL-MCNC: 0.3 MG/DL (ref 0.1–1)
BUN SERPL-MCNC: 25 MG/DL (ref 8–23)
CALCIUM SERPL-MCNC: 9 MG/DL (ref 8.7–10.5)
CHLORIDE SERPL-SCNC: 106 MMOL/L (ref 95–110)
CO2 SERPL-SCNC: 26 MMOL/L (ref 23–29)
CREAT SERPL-MCNC: 1.7 MG/DL (ref 0.5–1.4)
CRP SERPL-MCNC: 10.7 MG/L (ref 0–8.2)
DIFFERENTIAL METHOD: ABNORMAL
EOSINOPHIL # BLD AUTO: 0.1 K/UL (ref 0–0.5)
EOSINOPHIL NFR BLD: 1.2 % (ref 0–8)
ERYTHROCYTE [DISTWIDTH] IN BLOOD BY AUTOMATED COUNT: 15.8 % (ref 11.5–14.5)
ERYTHROCYTE [SEDIMENTATION RATE] IN BLOOD BY WESTERGREN METHOD: 51 MM/HR (ref 0–20)
EST. GFR  (NO RACE VARIABLE): 30.9 ML/MIN/1.73 M^2
ESTIMATED AVG GLUCOSE: 85 MG/DL (ref 68–131)
GLUCOSE SERPL-MCNC: 80 MG/DL (ref 70–110)
HBA1C MFR BLD: 4.6 % (ref 4–5.6)
HCT VFR BLD AUTO: 30.4 % (ref 37–48.5)
HGB BLD-MCNC: 9.2 G/DL (ref 12–16)
IMM GRANULOCYTES # BLD AUTO: 0.03 K/UL (ref 0–0.04)
IMM GRANULOCYTES NFR BLD AUTO: 0.4 % (ref 0–0.5)
LYMPHOCYTES # BLD AUTO: 1 K/UL (ref 1–4.8)
LYMPHOCYTES NFR BLD: 12 % (ref 18–48)
MCH RBC QN AUTO: 29 PG (ref 27–31)
MCHC RBC AUTO-ENTMCNC: 30.3 G/DL (ref 32–36)
MCV RBC AUTO: 96 FL (ref 82–98)
MONOCYTES # BLD AUTO: 0.5 K/UL (ref 0.3–1)
MONOCYTES NFR BLD: 5.4 % (ref 4–15)
NEUTROPHILS # BLD AUTO: 6.8 K/UL (ref 1.8–7.7)
NEUTROPHILS NFR BLD: 80.5 % (ref 38–73)
NRBC BLD-RTO: 0 /100 WBC
PLATELET # BLD AUTO: 217 K/UL (ref 150–450)
PMV BLD AUTO: 12.8 FL (ref 9.2–12.9)
POTASSIUM SERPL-SCNC: 4.9 MMOL/L (ref 3.5–5.1)
PROT SERPL-MCNC: 6.2 G/DL (ref 6–8.4)
RBC # BLD AUTO: 3.17 M/UL (ref 4–5.4)
SODIUM SERPL-SCNC: 142 MMOL/L (ref 136–145)
WBC # BLD AUTO: 8.45 K/UL (ref 3.9–12.7)

## 2023-07-14 PROCEDURE — 83036 HEMOGLOBIN GLYCOSYLATED A1C: CPT | Performed by: FAMILY MEDICINE

## 2023-07-14 PROCEDURE — 85025 COMPLETE CBC W/AUTO DIFF WBC: CPT | Performed by: FAMILY MEDICINE

## 2023-07-14 PROCEDURE — 86140 C-REACTIVE PROTEIN: CPT | Performed by: FAMILY MEDICINE

## 2023-07-14 PROCEDURE — 85651 RBC SED RATE NONAUTOMATED: CPT | Mod: PO | Performed by: FAMILY MEDICINE

## 2023-07-14 PROCEDURE — 80053 COMPREHEN METABOLIC PANEL: CPT | Performed by: FAMILY MEDICINE

## 2023-07-16 PROCEDURE — G0179 PR HOME HEALTH MD RECERTIFICATION: ICD-10-PCS | Mod: ,,, | Performed by: FAMILY MEDICINE

## 2023-07-16 PROCEDURE — G0179 MD RECERTIFICATION HHA PT: HCPCS | Mod: ,,, | Performed by: FAMILY MEDICINE

## 2023-07-17 ENCOUNTER — TELEPHONE (OUTPATIENT)
Dept: FAMILY MEDICINE | Facility: CLINIC | Age: 78
End: 2023-07-17

## 2023-07-17 NOTE — TELEPHONE ENCOUNTER
Left message for daughter, Marisol, to call me back and it was not urgent, but I do need to speak to her

## 2023-07-17 NOTE — TELEPHONE ENCOUNTER
Spoke to daughter, Marisol, with results verbatim per Dr Dwyer. Verbalized understanding on all, including to take Vitron C daily and increase water intake. Advised I will call when 3 month lab is due. Remind me created. Vitron C added to med list.

## 2023-07-17 NOTE — TELEPHONE ENCOUNTER
----- Message from Khadar Dwyer MD sent at 7/16/2023  7:34 PM CDT -----  Call patient's caregiver.  CRP and sed rate have improved.  Kidney function is slightly weaker than before.  Liver looks good.  Blood sugar under good control with an  A1c of 4.6.  Hematocrit is down to 30.4 showing more anemia.  Would recommend she take an iron pill once a day Vitron-C.  Increase her water intake if possible.  Recheck CBC BMP ferritin in 3 months

## 2023-07-17 NOTE — PROGRESS NOTES
Call patient's caregiver.  CRP and sed rate have improved.  Kidney function is slightly weaker than before.  Liver looks good.  Blood sugar under good control with an  A1c of 4.6.  Hematocrit is down to 30.4 showing more anemia.  Would recommend she take an iron pill once a day Vitron-C.  Increase her water intake if possible.  Recheck CBC BMP ferritin in 3 months

## 2023-07-21 ENCOUNTER — HOSPITAL ENCOUNTER (INPATIENT)
Facility: HOSPITAL | Age: 78
LOS: 1 days | Discharge: HOME HEALTH | DRG: 291 | End: 2023-07-23
Attending: EMERGENCY MEDICINE | Admitting: INTERNAL MEDICINE
Payer: MEDICARE

## 2023-07-21 DIAGNOSIS — I25.10 ASCVD (ARTERIOSCLEROTIC CARDIOVASCULAR DISEASE): ICD-10-CM

## 2023-07-21 DIAGNOSIS — J44.1 COPD EXACERBATION: ICD-10-CM

## 2023-07-21 DIAGNOSIS — I50.30 HEART FAILURE WITH PRESERVED EJECTION FRACTION, UNSPECIFIED HF CHRONICITY: ICD-10-CM

## 2023-07-21 DIAGNOSIS — E87.79 CARDIAC VOLUME OVERLOAD: Primary | ICD-10-CM

## 2023-07-21 DIAGNOSIS — T50.905A ADVERSE EFFECT OF DRUG, INITIAL ENCOUNTER: ICD-10-CM

## 2023-07-21 DIAGNOSIS — R07.9 CHEST PAIN: ICD-10-CM

## 2023-07-21 DIAGNOSIS — I50.41 ACUTE COMBINED SYSTOLIC AND DIASTOLIC CONGESTIVE HEART FAILURE: ICD-10-CM

## 2023-07-21 DIAGNOSIS — I73.9 PVD (PERIPHERAL VASCULAR DISEASE): ICD-10-CM

## 2023-07-21 LAB
ALBUMIN SERPL BCP-MCNC: 3 G/DL (ref 3.5–5.2)
ALP SERPL-CCNC: 201 U/L (ref 55–135)
ALT SERPL W/O P-5'-P-CCNC: 37 U/L (ref 10–44)
ANION GAP SERPL CALC-SCNC: 7 MMOL/L (ref 8–16)
AST SERPL-CCNC: 28 U/L (ref 10–40)
BASOPHILS # BLD AUTO: 0.05 K/UL (ref 0–0.2)
BASOPHILS NFR BLD: 0.6 % (ref 0–1.9)
BILIRUB SERPL-MCNC: 0.6 MG/DL (ref 0.1–1)
BILIRUB UR QL STRIP: NEGATIVE
BNP SERPL-MCNC: 341 PG/ML (ref 0–99)
BUN SERPL-MCNC: 24 MG/DL (ref 8–23)
CALCIUM SERPL-MCNC: 8.8 MG/DL (ref 8.7–10.5)
CHLORIDE SERPL-SCNC: 104 MMOL/L (ref 95–110)
CLARITY UR: CLEAR
CO2 SERPL-SCNC: 28 MMOL/L (ref 23–29)
COLOR UR: YELLOW
CREAT SERPL-MCNC: 1.5 MG/DL (ref 0.5–1.4)
DIFFERENTIAL METHOD: ABNORMAL
EOSINOPHIL # BLD AUTO: 0.2 K/UL (ref 0–0.5)
EOSINOPHIL NFR BLD: 1.9 % (ref 0–8)
ERYTHROCYTE [DISTWIDTH] IN BLOOD BY AUTOMATED COUNT: 15.2 % (ref 11.5–14.5)
EST. GFR  (NO RACE VARIABLE): 35.9 ML/MIN/1.73 M^2
GLUCOSE SERPL-MCNC: 95 MG/DL (ref 70–110)
GLUCOSE UR QL STRIP: NEGATIVE
HCT VFR BLD AUTO: 28.6 % (ref 37–48.5)
HGB BLD-MCNC: 8.6 G/DL (ref 12–16)
HGB UR QL STRIP: NEGATIVE
IMM GRANULOCYTES # BLD AUTO: 0.03 K/UL (ref 0–0.04)
IMM GRANULOCYTES NFR BLD AUTO: 0.4 % (ref 0–0.5)
INR PPP: 0.9 (ref 0.8–1.2)
IRON SERPL-MCNC: 47 UG/DL (ref 30–160)
KETONES UR QL STRIP: NEGATIVE
LEUKOCYTE ESTERASE UR QL STRIP: NEGATIVE
LYMPHOCYTES # BLD AUTO: 1.3 K/UL (ref 1–4.8)
LYMPHOCYTES NFR BLD: 16.5 % (ref 18–48)
MCH RBC QN AUTO: 29.1 PG (ref 27–31)
MCHC RBC AUTO-ENTMCNC: 30.1 G/DL (ref 32–36)
MCV RBC AUTO: 97 FL (ref 82–98)
MONOCYTES # BLD AUTO: 0.5 K/UL (ref 0.3–1)
MONOCYTES NFR BLD: 6.3 % (ref 4–15)
NEUTROPHILS # BLD AUTO: 5.7 K/UL (ref 1.8–7.7)
NEUTROPHILS NFR BLD: 74.3 % (ref 38–73)
NITRITE UR QL STRIP: NEGATIVE
NRBC BLD-RTO: 0 /100 WBC
PH UR STRIP: 6 [PH] (ref 5–8)
PLATELET # BLD AUTO: 240 K/UL (ref 150–450)
PMV BLD AUTO: 12.1 FL (ref 9.2–12.9)
POTASSIUM SERPL-SCNC: 4.7 MMOL/L (ref 3.5–5.1)
PROT SERPL-MCNC: 6.3 G/DL (ref 6–8.4)
PROT UR QL STRIP: NEGATIVE
PROTHROMBIN TIME: 10.3 SEC (ref 9–12.5)
RBC # BLD AUTO: 2.96 M/UL (ref 4–5.4)
SATURATED IRON: 18 % (ref 20–50)
SODIUM SERPL-SCNC: 139 MMOL/L (ref 136–145)
SP GR UR STRIP: 1 (ref 1–1.03)
TOTAL IRON BINDING CAPACITY: 255 UG/DL (ref 250–450)
TRANSFERRIN SERPL-MCNC: 182 MG/DL (ref 200–375)
TROPONIN I SERPL HS-MCNC: 12.3 PG/ML (ref 0–14.9)
URN SPEC COLLECT METH UR: NORMAL
UROBILINOGEN UR STRIP-ACNC: NEGATIVE EU/DL
WBC # BLD AUTO: 7.72 K/UL (ref 3.9–12.7)

## 2023-07-21 PROCEDURE — 85610 PROTHROMBIN TIME: CPT | Performed by: EMERGENCY MEDICINE

## 2023-07-21 PROCEDURE — 83880 ASSAY OF NATRIURETIC PEPTIDE: CPT | Performed by: EMERGENCY MEDICINE

## 2023-07-21 PROCEDURE — 93010 EKG 12-LEAD: ICD-10-PCS | Mod: ,,, | Performed by: SPECIALIST

## 2023-07-21 PROCEDURE — 80053 COMPREHEN METABOLIC PANEL: CPT | Performed by: EMERGENCY MEDICINE

## 2023-07-21 PROCEDURE — 81003 URINALYSIS AUTO W/O SCOPE: CPT | Performed by: EMERGENCY MEDICINE

## 2023-07-21 PROCEDURE — 25000003 PHARM REV CODE 250: Performed by: STUDENT IN AN ORGANIZED HEALTH CARE EDUCATION/TRAINING PROGRAM

## 2023-07-21 PROCEDURE — 85025 COMPLETE CBC W/AUTO DIFF WBC: CPT | Performed by: EMERGENCY MEDICINE

## 2023-07-21 PROCEDURE — G0378 HOSPITAL OBSERVATION PER HR: HCPCS

## 2023-07-21 PROCEDURE — 84466 ASSAY OF TRANSFERRIN: CPT | Performed by: EMERGENCY MEDICINE

## 2023-07-21 PROCEDURE — 63600175 PHARM REV CODE 636 W HCPCS: Performed by: STUDENT IN AN ORGANIZED HEALTH CARE EDUCATION/TRAINING PROGRAM

## 2023-07-21 PROCEDURE — 84484 ASSAY OF TROPONIN QUANT: CPT | Performed by: EMERGENCY MEDICINE

## 2023-07-21 PROCEDURE — 93010 ELECTROCARDIOGRAM REPORT: CPT | Mod: ,,, | Performed by: SPECIALIST

## 2023-07-21 PROCEDURE — 82728 ASSAY OF FERRITIN: CPT | Performed by: EMERGENCY MEDICINE

## 2023-07-21 PROCEDURE — 93005 ELECTROCARDIOGRAM TRACING: CPT | Performed by: SPECIALIST

## 2023-07-21 PROCEDURE — 96374 THER/PROPH/DIAG INJ IV PUSH: CPT

## 2023-07-21 PROCEDURE — 99285 EMERGENCY DEPT VISIT HI MDM: CPT | Mod: 25

## 2023-07-21 RX ORDER — IBUPROFEN 200 MG
24 TABLET ORAL
Status: DISCONTINUED | OUTPATIENT
Start: 2023-07-21 | End: 2023-07-23 | Stop reason: HOSPADM

## 2023-07-21 RX ORDER — HYDROCODONE BITARTRATE AND ACETAMINOPHEN 10; 325 MG/1; MG/1
TABLET ORAL
COMMUNITY
End: 2023-07-22 | Stop reason: SDUPTHER

## 2023-07-21 RX ORDER — AZITHROMYCIN 250 MG/1
TABLET, FILM COATED ORAL
COMMUNITY
End: 2023-07-22

## 2023-07-21 RX ORDER — FLUTICASONE PROPIONATE 50 MCG
1 SPRAY, SUSPENSION (ML) NASAL DAILY
COMMUNITY
End: 2023-07-27

## 2023-07-21 RX ORDER — CIPROFLOXACIN 250 MG/1
TABLET, FILM COATED ORAL
COMMUNITY
End: 2023-07-22

## 2023-07-21 RX ORDER — COLCHICINE 0.6 MG/1
0.3 TABLET ORAL EVERY OTHER DAY
Status: ON HOLD | COMMUNITY
End: 2023-08-30 | Stop reason: HOSPADM

## 2023-07-21 RX ORDER — TRIAMCINOLONE ACETONIDE 1 MG/G
1 CREAM TOPICAL 2 TIMES DAILY
COMMUNITY

## 2023-07-21 RX ORDER — TEMAZEPAM 15 MG/1
1 CAPSULE ORAL EVERY EVENING
Status: ON HOLD | COMMUNITY
End: 2023-07-23 | Stop reason: HOSPADM

## 2023-07-21 RX ORDER — PREDNISONE 50 MG/1
TABLET ORAL
COMMUNITY
End: 2023-07-22

## 2023-07-21 RX ORDER — CIPROFLOXACIN AND DEXAMETHASONE 3; 1 MG/ML; MG/ML
SUSPENSION/ DROPS AURICULAR (OTIC)
COMMUNITY
End: 2023-07-22

## 2023-07-21 RX ORDER — PANTOPRAZOLE SODIUM 40 MG/10ML
40 INJECTION, POWDER, LYOPHILIZED, FOR SOLUTION INTRAVENOUS 2 TIMES DAILY
Status: DISCONTINUED | OUTPATIENT
Start: 2023-07-21 | End: 2023-07-23

## 2023-07-21 RX ORDER — POTASSIUM CHLORIDE 750 MG/1
10 CAPSULE, EXTENDED RELEASE ORAL DAILY
Status: ON HOLD | COMMUNITY
End: 2023-07-29 | Stop reason: SDUPTHER

## 2023-07-21 RX ORDER — ISOSORBIDE MONONITRATE 60 MG/1
120 TABLET, EXTENDED RELEASE ORAL DAILY
Status: DISCONTINUED | OUTPATIENT
Start: 2023-07-22 | End: 2023-07-23 | Stop reason: HOSPADM

## 2023-07-21 RX ORDER — AMOXICILLIN AND CLAVULANATE POTASSIUM 875; 125 MG/1; MG/1
1 TABLET, FILM COATED ORAL 2 TIMES DAILY
COMMUNITY
Start: 2023-07-07 | End: 2023-07-22

## 2023-07-21 RX ORDER — GLUCAGON 1 MG
1 KIT INJECTION
Status: DISCONTINUED | OUTPATIENT
Start: 2023-07-21 | End: 2023-07-23 | Stop reason: HOSPADM

## 2023-07-21 RX ORDER — CICLOPIROX OLAMINE 7.7 MG/G
CREAM TOPICAL
COMMUNITY
End: 2023-07-22

## 2023-07-21 RX ORDER — METOPROLOL TARTRATE 50 MG/1
50 TABLET ORAL 2 TIMES DAILY
Status: DISCONTINUED | OUTPATIENT
Start: 2023-07-22 | End: 2023-07-23 | Stop reason: HOSPADM

## 2023-07-21 RX ORDER — HYDROCODONE BITARTRATE AND ACETAMINOPHEN 5; 325 MG/1; MG/1
1 TABLET ORAL EVERY 12 HOURS PRN
Status: ON HOLD | COMMUNITY
End: 2023-07-23 | Stop reason: HOSPADM

## 2023-07-21 RX ORDER — METFORMIN HYDROCHLORIDE 500 MG/1
500 TABLET ORAL
Status: ON HOLD | COMMUNITY
End: 2023-07-23 | Stop reason: HOSPADM

## 2023-07-21 RX ORDER — SODIUM CHLORIDE 0.9 % (FLUSH) 0.9 %
10 SYRINGE (ML) INJECTION EVERY 12 HOURS PRN
Status: DISCONTINUED | OUTPATIENT
Start: 2023-07-21 | End: 2023-07-23 | Stop reason: HOSPADM

## 2023-07-21 RX ORDER — PANCRELIPASE 30000; 6000; 19000 [USP'U]/1; [USP'U]/1; [USP'U]/1
1 CAPSULE, DELAYED RELEASE PELLETS ORAL
Status: ON HOLD | COMMUNITY
End: 2023-07-23 | Stop reason: HOSPADM

## 2023-07-21 RX ORDER — NYSTATIN 100000 [USP'U]/G
1 POWDER TOPICAL 3 TIMES DAILY
COMMUNITY
End: 2023-07-27

## 2023-07-21 RX ORDER — TRAMADOL HYDROCHLORIDE 50 MG/1
1 TABLET ORAL 3 TIMES DAILY PRN
Status: ON HOLD | COMMUNITY
End: 2023-07-23 | Stop reason: HOSPADM

## 2023-07-21 RX ORDER — FLUTICASONE PROPIONATE AND SALMETEROL 250; 50 UG/1; UG/1
1 POWDER RESPIRATORY (INHALATION) 2 TIMES DAILY
COMMUNITY
End: 2023-11-04

## 2023-07-21 RX ORDER — DIFLUPREDNATE OPHTHALMIC 0.5 MG/ML
EMULSION OPHTHALMIC
COMMUNITY
End: 2023-07-22

## 2023-07-21 RX ORDER — NALOXONE HCL 0.4 MG/ML
0.02 VIAL (ML) INJECTION
Status: DISCONTINUED | OUTPATIENT
Start: 2023-07-21 | End: 2023-07-23 | Stop reason: HOSPADM

## 2023-07-21 RX ORDER — HEPARIN SODIUM 5000 [USP'U]/ML
5000 INJECTION, SOLUTION INTRAVENOUS; SUBCUTANEOUS EVERY 12 HOURS
Status: DISCONTINUED | OUTPATIENT
Start: 2023-07-22 | End: 2023-07-21

## 2023-07-21 RX ORDER — OFLOXACIN 3 MG/ML
SOLUTION/ DROPS OPHTHALMIC
COMMUNITY
End: 2023-07-22

## 2023-07-21 RX ORDER — BROMFENAC SODIUM 0.7 MG/ML
SOLUTION/ DROPS OPHTHALMIC
COMMUNITY
End: 2023-07-22

## 2023-07-21 RX ORDER — IBUPROFEN 200 MG
16 TABLET ORAL
Status: DISCONTINUED | OUTPATIENT
Start: 2023-07-21 | End: 2023-07-23 | Stop reason: HOSPADM

## 2023-07-21 RX ORDER — CIPROFLOXACIN 500 MG/1
TABLET ORAL
COMMUNITY
End: 2023-07-22

## 2023-07-21 RX ORDER — RANOLAZINE 500 MG/1
500 TABLET, EXTENDED RELEASE ORAL 2 TIMES DAILY
Status: DISCONTINUED | OUTPATIENT
Start: 2023-07-22 | End: 2023-07-23 | Stop reason: HOSPADM

## 2023-07-21 RX ORDER — KETOCONAZOLE 20 MG/G
1 CREAM TOPICAL 2 TIMES DAILY
COMMUNITY

## 2023-07-21 RX ORDER — CLINDAMYCIN HYDROCHLORIDE 150 MG/1
CAPSULE ORAL
COMMUNITY
End: 2023-07-22

## 2023-07-21 RX ORDER — MELOXICAM 7.5 MG/1
1 TABLET ORAL DAILY
COMMUNITY
End: 2023-07-22

## 2023-07-21 RX ORDER — DOXYCYCLINE 100 MG/1
CAPSULE ORAL
COMMUNITY
End: 2023-07-22

## 2023-07-21 RX ORDER — PANCRELIPASE 36000; 180000; 114000 [USP'U]/1; [USP'U]/1; [USP'U]/1
1 CAPSULE, DELAYED RELEASE PELLETS ORAL
Status: ON HOLD | COMMUNITY
End: 2023-07-23 | Stop reason: HOSPADM

## 2023-07-21 RX ORDER — METRONIDAZOLE 500 MG/1
TABLET ORAL
COMMUNITY
End: 2023-07-22

## 2023-07-21 RX ORDER — SPIRONOLACTONE 25 MG/1
25 TABLET ORAL
Status: ON HOLD | COMMUNITY
End: 2023-07-23 | Stop reason: HOSPADM

## 2023-07-21 RX ORDER — PREDNISONE 20 MG/1
TABLET ORAL
COMMUNITY
End: 2023-07-22

## 2023-07-21 RX ORDER — NALOXONE HYDROCHLORIDE 4 MG/.1ML
1 SPRAY NASAL ONCE
Status: ON HOLD | COMMUNITY
End: 2023-07-23 | Stop reason: HOSPADM

## 2023-07-21 RX ORDER — DIPHENOXYLATE HYDROCHLORIDE AND ATROPINE SULFATE 2.5; .025 MG/1; MG/1
1 TABLET ORAL DAILY PRN
COMMUNITY
End: 2023-07-27

## 2023-07-21 RX ORDER — FUROSEMIDE 10 MG/ML
120 INJECTION INTRAMUSCULAR; INTRAVENOUS
Status: COMPLETED | OUTPATIENT
Start: 2023-07-21 | End: 2023-07-21

## 2023-07-21 RX ORDER — LOPERAMIDE HYDROCHLORIDE 2 MG/1
2 CAPSULE ORAL 4 TIMES DAILY
Status: ON HOLD | COMMUNITY
End: 2023-07-29 | Stop reason: HOSPADM

## 2023-07-21 RX ORDER — GABAPENTIN 100 MG/1
1 CAPSULE ORAL EVERY EVENING
Status: ON HOLD | COMMUNITY
End: 2023-07-23 | Stop reason: HOSPADM

## 2023-07-21 RX ADMIN — FUROSEMIDE 120 MG: 10 INJECTION, SOLUTION INTRAMUSCULAR; INTRAVENOUS at 09:07

## 2023-07-21 RX ADMIN — CAPTOPRIL 6.25 MG: 12.5 TABLET ORAL at 09:07

## 2023-07-21 NOTE — Clinical Note
Diagnosis: Chest pain [539407]   Future Attending Provider: REYNA GARCIA [[id number]]   Place in Observation: [facility] [[id number]]   Admitting Provider:: REYNA GARCIA [[id number]]

## 2023-07-22 PROBLEM — I73.9 PVD (PERIPHERAL VASCULAR DISEASE): Status: ACTIVE | Noted: 2023-07-22

## 2023-07-22 PROBLEM — I50.30 HEART FAILURE WITH PRESERVED EJECTION FRACTION: Status: ACTIVE | Noted: 2023-07-22

## 2023-07-22 PROBLEM — I50.9 ACUTE CHF: Status: ACTIVE | Noted: 2023-07-22

## 2023-07-22 PROBLEM — T50.905A DRUG REACTION: Status: ACTIVE | Noted: 2023-07-22

## 2023-07-22 LAB
ALLENS TEST: ABNORMAL
CHOLEST SERPL-MCNC: 96 MG/DL (ref 120–199)
CHOLEST/HDLC SERPL: 2.2 {RATIO} (ref 2–5)
DELSYS: ABNORMAL
FERRITIN SERPL-MCNC: 102 NG/ML (ref 20–300)
HCO3 UR-SCNC: 34.5 MMOL/L (ref 24–28)
HDLC SERPL-MCNC: 44 MG/DL (ref 40–75)
HDLC SERPL: 45.8 % (ref 20–50)
LDLC SERPL CALC-MCNC: 31.8 MG/DL (ref 63–159)
NONHDLC SERPL-MCNC: 52 MG/DL
PCO2 BLDA: 55.9 MMHG (ref 35–45)
PH SMN: 7.4 [PH] (ref 7.35–7.45)
PO2 BLDA: 51 MMHG (ref 40–60)
POC BE: 10 MMOL/L
POC SATURATED O2: 85 % (ref 95–100)
POC TCO2: 36 MMOL/L (ref 24–29)
SAMPLE: ABNORMAL
SITE: ABNORMAL
TRIGL SERPL-MCNC: 101 MG/DL (ref 30–150)
TROPONIN I SERPL HS-MCNC: 11.1 PG/ML (ref 0–14.9)

## 2023-07-22 PROCEDURE — 25000003 PHARM REV CODE 250: Performed by: INTERNAL MEDICINE

## 2023-07-22 PROCEDURE — 80061 LIPID PANEL: CPT | Performed by: SPECIALIST

## 2023-07-22 PROCEDURE — 63600175 PHARM REV CODE 636 W HCPCS: Performed by: INTERNAL MEDICINE

## 2023-07-22 PROCEDURE — 94640 AIRWAY INHALATION TREATMENT: CPT

## 2023-07-22 PROCEDURE — 94799 UNLISTED PULMONARY SVC/PX: CPT

## 2023-07-22 PROCEDURE — 25000242 PHARM REV CODE 250 ALT 637 W/ HCPCS: Performed by: HOSPITALIST

## 2023-07-22 PROCEDURE — 99900035 HC TECH TIME PER 15 MIN (STAT)

## 2023-07-22 PROCEDURE — 94761 N-INVAS EAR/PLS OXIMETRY MLT: CPT

## 2023-07-22 PROCEDURE — 96375 TX/PRO/DX INJ NEW DRUG ADDON: CPT

## 2023-07-22 PROCEDURE — 21400001 HC TELEMETRY ROOM

## 2023-07-22 PROCEDURE — 36415 COLL VENOUS BLD VENIPUNCTURE: CPT | Performed by: SPECIALIST

## 2023-07-22 PROCEDURE — 82803 BLOOD GASES ANY COMBINATION: CPT

## 2023-07-22 PROCEDURE — 99223 1ST HOSP IP/OBS HIGH 75: CPT | Mod: ,,, | Performed by: SPECIALIST

## 2023-07-22 PROCEDURE — 99223 PR INITIAL HOSPITAL CARE,LEVL III: ICD-10-PCS | Mod: ,,, | Performed by: SPECIALIST

## 2023-07-22 PROCEDURE — C9113 INJ PANTOPRAZOLE SODIUM, VIA: HCPCS | Performed by: INTERNAL MEDICINE

## 2023-07-22 PROCEDURE — 99900031 HC PATIENT EDUCATION (STAT)

## 2023-07-22 PROCEDURE — 25000003 PHARM REV CODE 250: Performed by: HOSPITALIST

## 2023-07-22 PROCEDURE — 27000221 HC OXYGEN, UP TO 24 HOURS

## 2023-07-22 RX ORDER — IPRATROPIUM BROMIDE AND ALBUTEROL SULFATE 2.5; .5 MG/3ML; MG/3ML
3 SOLUTION RESPIRATORY (INHALATION) EVERY 4 HOURS PRN
Status: DISCONTINUED | OUTPATIENT
Start: 2023-07-22 | End: 2023-07-23 | Stop reason: HOSPADM

## 2023-07-22 RX ORDER — TALC
6 POWDER (GRAM) TOPICAL NIGHTLY PRN
Status: DISCONTINUED | OUTPATIENT
Start: 2023-07-22 | End: 2023-07-23 | Stop reason: HOSPADM

## 2023-07-22 RX ORDER — BUMETANIDE 0.25 MG/ML
1 INJECTION INTRAMUSCULAR; INTRAVENOUS DAILY
Status: DISCONTINUED | OUTPATIENT
Start: 2023-07-22 | End: 2023-07-23

## 2023-07-22 RX ORDER — ATORVASTATIN CALCIUM 40 MG/1
40 TABLET, FILM COATED ORAL DAILY
Status: DISCONTINUED | OUTPATIENT
Start: 2023-07-23 | End: 2023-07-23 | Stop reason: HOSPADM

## 2023-07-22 RX ADMIN — METOPROLOL TARTRATE 50 MG: 50 TABLET, FILM COATED ORAL at 09:07

## 2023-07-22 RX ADMIN — IPRATROPIUM BROMIDE AND ALBUTEROL SULFATE 3 ML: .5; 3 SOLUTION RESPIRATORY (INHALATION) at 09:07

## 2023-07-22 RX ADMIN — RANOLAZINE 500 MG: 500 TABLET, EXTENDED RELEASE ORAL at 08:07

## 2023-07-22 RX ADMIN — IPRATROPIUM BROMIDE AND ALBUTEROL SULFATE 3 ML: .5; 3 SOLUTION RESPIRATORY (INHALATION) at 05:07

## 2023-07-22 RX ADMIN — PANTOPRAZOLE SODIUM 40 MG: 40 INJECTION, POWDER, FOR SOLUTION INTRAVENOUS at 09:07

## 2023-07-22 RX ADMIN — METOPROLOL TARTRATE 50 MG: 50 TABLET, FILM COATED ORAL at 08:07

## 2023-07-22 RX ADMIN — BUMETANIDE 1 MG: 0.25 INJECTION INTRAMUSCULAR; INTRAVENOUS at 01:07

## 2023-07-22 RX ADMIN — Medication 6 MG: at 08:07

## 2023-07-22 RX ADMIN — ISOSORBIDE MONONITRATE 120 MG: 60 TABLET, EXTENDED RELEASE ORAL at 09:07

## 2023-07-22 RX ADMIN — TICAGRELOR 90 MG: 90 TABLET ORAL at 09:07

## 2023-07-22 RX ADMIN — PANTOPRAZOLE SODIUM 40 MG: 40 INJECTION, POWDER, FOR SOLUTION INTRAVENOUS at 04:07

## 2023-07-22 RX ADMIN — TICAGRELOR 90 MG: 90 TABLET ORAL at 08:07

## 2023-07-22 RX ADMIN — RANOLAZINE 500 MG: 500 TABLET, EXTENDED RELEASE ORAL at 09:07

## 2023-07-22 RX ADMIN — PANTOPRAZOLE SODIUM 40 MG: 40 INJECTION, POWDER, FOR SOLUTION INTRAVENOUS at 08:07

## 2023-07-22 NOTE — H&P
formerly [facility] - Emergency Dept    History & Physical      Patient Name: Marisol Kerr  MRN: [id number]  Admission Date: 7/21/2023  Attending Physician: Vamshi Young MD   Primary Care Provider: Khadar Dwyer MD         Patient information was obtained from patient, past medical records, and ER records.     Subjective:     Principal Problem:Shortness of breath    Chief Complaint:   Chief Complaint   Patient presents with    Chest Pain     X3 days    Shortness of Breath     X3 days. 3L NC @ home. Home health nurse referred pt to come to ED due to ELBERT edema on legs and SOB.         HPI: 77-year-old female history of multivessel CAD with stent on DAPT, very severe COPD, chronic diastolic CHF, chronic hypoxemic respiratory failure on oxygen, obesity, CKD 3, history of cholangitis status post ERCP with sphincterotomy and bile duct stone removal, type 2 diabetes.     She presents to ER presents to the ED with her daughter after being advised to present to the ER for shortness of breath by her home health nurse.+ve 8-10lb weight gain. Chronic lymphedema so legs usually swollen but theyre now twice their normal size.       In ER , Cr 1.5 (baseline 1.4-1.6). Uncontrolled /74           Past Medical History:   Diagnosis Date    CAD (coronary artery disease)     Cancer     colon    CHF (congestive heart failure)     Colitis     Colon cancer     COPD (chronic obstructive pulmonary disease)     Decreased hearing     Diabetes mellitus     Diabetes mellitus, type 2     Hypercholesterolemia     Hypertension     Hypoxemia     Insomnia     Obesity     Osteoarthritis        Past Surgical History:   Procedure Laterality Date    ANGIOGRAM, CORONARY, WITH LEFT HEART CATHETERIZATION N/A 2/10/2022    Procedure: Angiogram, Coronary, with Left Heart Cath;  Surgeon: Cristian Benjamin MD;  Location: [facility] CATH/EP LAB;  Service: Cardiology;  Laterality: N/A;    CARDIAC CATHETERIZATION      CHOLECYSTECTOMY       COLECTOMY      CORONARY ANGIOPLASTY      CORONARY STENT PLACEMENT      ERCP N/A 1/16/2023    Procedure: ERCP (ENDOSCOPIC RETROGRADE CHOLANGIOPANCREATOGRAPHY);  Surgeon: Biju Kramer III, MD;  Location: [facility] ENDO;  Service: Endoscopy;  Laterality: N/A;    ESOPHAGOGASTRODUODENOSCOPY N/A 1/29/2023    Procedure: EGD (ESOPHAGOGASTRODUODENOSCOPY);  Surgeon: Aarti Alvarez MD;  Location: [facility] ENDO;  Service: Endoscopy;  Laterality: N/A;    EXTERNAL EAR SURGERY      EYE SURGERY      FLEXIBLE SIGMOIDOSCOPY N/A 9/19/2019    Procedure: SIGMOIDOSCOPY, FLEXIBLE;  Surgeon: Biju Kramer III, MD;  Location: [facility] ENDO;  Service: Endoscopy;  Laterality: N/A;    HYSTERECTOMY      partial       Review of patient's allergies indicates:   Allergen Reactions    Iodinated contrast media     Strawberries [strawberry] Hives and Itching       No current facility-administered medications on file prior to encounter.     Current Outpatient Medications on File Prior to Encounter   Medication Sig    albuterol (VENTOLIN HFA) 90 mcg/actuation inhaler Inhale 2 puffs into the lungs every 6 (six) hours as needed for Wheezing or Shortness of Breath. Rescue    albuterol-ipratropium (DUO-NEB) 2.5 mg-0.5 mg/3 mL nebulizer solution Take 3 mLs by nebulization every 4 (four) hours as needed for Wheezing or Shortness of Breath. Rescue    allopurinoL (ZYLOPRIM) 100 MG tablet Take 0.5 tablets (50 mg total) by mouth once daily.    ALPRAZolam (XANAX) 0.25 MG tablet Take 1 tablet (0.25 mg total) by mouth every evening.    amoxicillin-clavulanate 875-125mg (AUGMENTIN) 875-125 mg per tablet Take 1 tablet by mouth 2 (two) times daily.    aspirin (ECOTRIN) 81 MG EC tablet Take 1 tablet (81 mg total) by mouth every morning.    atorvastatin (LIPITOR) 40 MG tablet Take 1 tablet (40 mg total) by mouth once daily.    azithromycin (Z-ROSA) 250 MG tablet     bromfenac (PROLENSA) 0.07 % Drop     cholecalciferol, vitamin D3, 125 mcg (5,000 unit) Tab Take 5,000  Units by mouth once daily.    cholestyramine (QUESTRAN) 4 gram packet Take 1 packet (4 g total) by mouth 2 (two) times daily.    ciclopirox (LOPROX) 0.77 % Crea     ciprofloxacin HCl (CIPRO) 250 MG tablet     ciprofloxacin HCl (CIPRO) 500 MG tablet     ciprofloxacin-dexAMETHasone 0.3-0.1% (CIPRODEX) 0.3-0.1 % DrpS     clindamycin (CLEOCIN) 150 MG capsule     coenzyme Q10 100 mg capsule Take 100 mg by mouth every morning.    colchicine (COLCRYS) 0.6 mg tablet TAKE 1/2 (ONE-HALF) TABLET BY MOUTH EVERY OTHER DAY    difluprednate (DUREZOL) 0.05 % Drop ophthalmic solution     diltiaZEM (CARDIZEM CD) 120 MG Cp24 Take 1 capsule (120 mg total) by mouth once daily.    diphenoxylate-atropine 2.5-0.025 mg (LOMOTIL) 2.5-0.025 mg per tablet     doxycycline (VIBRAMYCIN) 100 MG Cap     ergocalciferol (VITAMIN D2) 50,000 unit Cap Take 1 capsule (50,000 Units total) by mouth every 7 days.    ferrous sulfate 325 (65 FE) MG EC tablet Take 325 mg by mouth once daily.    fish oil-omega-3 fatty acids 300-1,000 mg capsule Take 2 capsules by mouth every morning.    fluticasone propionate (FLONASE) 50 mcg/actuation nasal spray     fluticasone-salmeterol diskus inhaler 250-50 mcg INHALE 1 DOSE INTO LUNGS TWICE DAILY    fluticasone-umeclidin-vilanter (TRELEGY ELLIPTA) 100-62.5-25 mcg DsDv     furosemide (LASIX) 40 MG tablet Take 1 tablet (40 mg total) by mouth once daily.    gabapentin (NEURONTIN) 100 MG capsule Take 1 capsule by mouth once daily.    hydrALAZINE (APRESOLINE) 50 MG tablet Take 1 tablet (50 mg total) by mouth every 12 (twelve) hours.    HYDROcodone-acetaminophen (NORCO)  mg per tablet     HYDROcodone-acetaminophen (NORCO) 5-325 mg per tablet TAKE 1 TABLET BY MOUTH EVERY 12 HOURS AS NEEDED FOR PAIN    ipratropium-albuteroL (COMBIVENT RESPIMAT)  mcg/actuation inhaler Inhale 2 puffs into the lungs every 4 (four) hours as needed for Shortness of Breath. Rescue    iron,carbonyl/ascorbic acid (VITRON-C ORAL) Take 1 tablet  by mouth once daily.    isosorbide mononitrate (IMDUR) 120 MG 24 hr tablet Take 1 tablet (120 mg total) by mouth once daily.    ketoconazole (NIZORAL) 2 % cream     lipase-protease-amylase (CREON) 36,000-114,000- 180,000 unit CpDR TAKE 1 CAPSULE BY MOUTH 4 TIMES DAILY BEFORE MEAL(S)    lipase-protease-amylase 6,000-19,000-30,000 units (CREON) 6,000-19,000 -30,000 unit capsule     loperamide (IMODIUM) 2 mg capsule     meloxicam (MOBIC) 7.5 MG tablet Take 1 tablet by mouth once daily.    metFORMIN (GLUCOPHAGE) 500 MG tablet     metoprolol tartrate (LOPRESSOR) 50 MG tablet Take 1 tablet (50 mg total) by mouth 2 (two) times daily.    metroNIDAZOLE (FLAGYL) 500 MG tablet     mupirocin (BACTROBAN) 2 % ointment Apply topically 2 (two) times daily.    naloxone (NARCAN) 4 mg/actuation Spry     nebulizer and compressor Reshma as directed    nitroGLYCERIN 0.4 MG/DOSE TL SPRY (NITROLINGUAL) 400 mcg/spray spray Place 1 spray under the tongue every 5 (five) minutes as needed for Chest pain (max of 3 doses).    nystatin (NYAMYC) powder     ofloxacin (OCUFLOX) 0.3 % ophthalmic solution     ondansetron (ZOFRAN-ODT) 4 MG TbDL Take 1 tablet (4 mg total) by mouth every 6 (six) hours as needed (nausea).    pantoprazole (PROTONIX) 40 MG tablet Take 1 tablet (40 mg total) by mouth once daily.    polycarbophil (FIBERCON) 625 mg tablet Take 625 mg by mouth once daily.    potassium chloride (MICRO-K) 10 MEQ CpSR     predniSONE (DELTASONE) 20 MG tablet TAKE 1 TABLEY BY MOUTH AS INSTRUCTED. THEN TAKE 3 TABLETS BY MOUTH THE NIGHT BEFORE PROCEDURE AND 3 TABLETS BY MOUTH THE MORNING OF PROCEDURE.    predniSONE (DELTASONE) 50 MG Tab     ranolazine (RANEXA) 500 MG Tb12 Take 1 tablet (500 mg total) by mouth 2 (two) times daily.    spironolactone (ALDACTONE) 25 MG tablet     temazepam (RESTORIL) 15 mg Cap Take 1 capsule by mouth once daily.    ticagrelor (BRILINTA) 90 mg tablet Take 1 tablet (90 mg total) by mouth every 12 (twelve) hours.    traMADoL  (ULTRAM) 50 mg tablet Take 1 tablet by mouth 3 times daily as needed.    triamcinolone acetonide 0.1% (KENALOG) 0.1 % cream     umeclidinium (INCRUSE ELLIPTA) 62.5 mcg/actuation inhalation capsule Inhale 62.5 mcg into the lungs every morning. Controller    vit C/E/Zn/coppr/lutein/zeaxan (PRESERVISION AREDS-2 ORAL) Take 1 tablet by mouth once daily.    [DISCONTINUED] benazepriL (LOTENSIN) 40 MG tablet Take 1 tablet (40 mg total) by mouth once daily.    [DISCONTINUED] cimetidine (TAGAMET) 300 MG tablet Take 1 tablet (300 mg total) by mouth every 6 (six) hours. Take 1 tablet (300 mg total) by mouth starting at 6 PM on 2022 (the evening before your angiogram procedure). Then take 1 tablet at 12 AM, then take 1 tablet at 6 AM on .    [DISCONTINUED] lisinopriL 10 MG tablet Take 1 tablet (10 mg total) by mouth once daily. HOLD UNTIL OTHERWISE DIRECTED BY PRIMARY CARE PROVIDER    [DISCONTINUED] lovastatin (MEVACOR) 40 MG tablet Take 1 tablet (40 mg total) by mouth every other day.     Family History       Problem Relation (Age of Onset)    Febrile seizures Mother    Heart failure Father          Tobacco Use    Smoking status: Former     Packs/day: 3.00     Years: 50.00     Pack years: 150.00     Types: Cigarettes     Start date: 3/30/1964     Quit date: 2006     Years since quittin.4    Smokeless tobacco: Never    Tobacco comments:     ex smoker 15 years   Substance and Sexual Activity    Alcohol use: Yes    Drug use: No    Sexual activity: Never     Review of Systems  Objective:     Vital Signs (Most Recent):  Temp: 97.6 °F (36.4 °C) (23)  Pulse: 66 (23)  Resp: (!) 25 (23)  BP: (!) 172/74 (23)  SpO2: 100 % (23) Vital Signs (24h Range):  Temp:  [97.6 °F (36.4 °C)] 97.6 °F (36.4 °C)  Pulse:  [64-84] 66  Resp:  [20-25] 25  SpO2:  [93 %-100 %] 100 %  BP: (172-175)/(74-80) 172/74     Weight: 68.9 kg (152 lb)  Body mass index is  26.09 kg/m².    Physical Exam   Nursing note and vitals reviewed.  Constitutional: She is pleasant  HENT:   Head: Normocephalic and atraumatic.   Eyes: Conjunctivae are normal.   Neck:   Normal range of motion.  Cardiovascular:  Normal rate.           Pulmonary/Chest:   Poor air movement   Abdominal: Abdomen is soft. There is no abdominal tenderness.   Musculoskeletal:         General: Normal range of motion.      Cervical back: Normal range of motion.      Neurological: She is alert. No sensory deficit.   Moves all extremities equally   Skin: No rash noted. Swelling b/l         Significant Labs: All pertinent labs within the past 24 hours have been reviewed.    Significant Imaging: I have reviewed all pertinent imaging results/findings within the past 24 hours.    Assessment/Plan:     Active Diagnoses:    Diagnosis Date Noted POA    PRINCIPAL PROBLEM:  Shortness of breath [R06.02] 01/28/2023 Yes      Problems Resolved During this Admission:     VTE Risk Mitigation (From admission, onward)           Ordered     heparin (porcine) injection 5,000 Units  Every 12 hours         07/21/23 2252     IP VTE HIGH RISK PATIENT  Once         07/21/23 2252     Place sequential compression device  Until discontinued         07/21/23 2252                    Acute hypoxemic likely secondary to CHF, and associated with very severe COPD (not in exacerbation), home O2  -received lasix IV 120mg in ER with rapid diuresis.  Will hold further diuresis until repeat BMP without electrolyte abnormalities  -Cardiology consult  -intake and output, daily weights  -check VBG     History of multivessel CAD with stent  -She had left heart catheterization 2/2022 severe triple-vessel disease and had complication with rectus sheet hematoma/bleeding and transferred to [facility] for IR intervention and embolization.  She was deemed too high risk for CABG.  She states that she was told she was too high risk for PCI, they are not planning on doing an angiogram  for high risk PCI, and she will be medically managed  -Continue aspirin and Brilinta     Uncontrolled HTN-monitor after lasix, prn labetalol     Progressive anemia on DAPT  -check occult blood stool, iron studies  -BID PPI  -GI consult   -given multivessel CAD unable to be corrected with CABG, PCI, would use lower threshold to transfuse (<8)     CKD 3-(baseline 1.4-1.6)   monitor ins and outs, avoid nephrotoxic agents     [id number]     Significant vascular disease within the bilateral lower extremities with stenosis between the common femoral and proximal superficial femoral arteries.    PMHx: lymphedema, hard of hearing     SCD ppx.   Restart home medications as ordered  Consider palliative care     Vamshi Young MD  Department of Hospital Medicine   [facility] - Emergency Dept    Vamshi Young MD  Department of Hospital Medicine   [facility] - Emergency Dept

## 2023-07-22 NOTE — NURSING
Nurses Note -- 4 Eyes      7/22/2023   2:21 PM      Skin assessed during: Admit      [] No Altered Skin Integrity Present    [x]Prevention Measures Documented      [x] Yes- Altered Skin Integrity Present or Discovered   [x] LDA Added if Not in Epic (Describe Wound)   [] New Altered Skin Integrity was Present on Admit and Documented in LDA   [] Wound Image Taken    Wound Care Consulted? Yes    Attending Nurse:  Luis A Ogden RN     Second RN/Staff Member:  Denita Friedman LPN

## 2023-07-22 NOTE — PHARMACY MED REC
"              .      Admission Medication History     The home medication history was taken by Francisca Mcdonald.    You may go to "Admission" then "Reconcile Home Medications" tabs to review and/or act upon these items.     The home medication list has been updated by the Pharmacy department.   Please read ALL comments highlighted in yellow.   Please address this information as you see fit.    Feel free to contact us if you have any questions or require assistance.      The medications listed below were removed from the home medication list. Please reorder if appropriate:  Patient reports no longer taking the following medication(s):  Amoxicillin  Z Dylan  Prolensa  Vitamin D3  Questran  Loproz  Ciprofloxacin  Ciprodex  Clindamycin  Durezol  Doxycycline  Meloxicam  Flagyl  Ofloxacin  Zofran  Prednisone      Medications listed below were obtained from: Patient/family and Analytic software- [employer]  No current facility-administered medications on file prior to encounter.     Current Outpatient Medications on File Prior to Encounter   Medication Sig Dispense Refill    albuterol (VENTOLIN HFA) 90 mcg/actuation inhaler Inhale 2 puffs into the lungs every 6 (six) hours as needed for Wheezing or Shortness of Breath. Rescue 36 g 3    albuterol-ipratropium (DUO-NEB) 2.5 mg-0.5 mg/3 mL nebulizer solution Take 3 mLs by nebulization every 4 (four) hours as needed for Wheezing or Shortness of Breath. Rescue 120 each 6    allopurinoL (ZYLOPRIM) 100 MG tablet Take 0.5 tablets (50 mg total) by mouth once daily. 45 tablet 1    ALPRAZolam (XANAX) 0.25 MG tablet Take 1 tablet (0.25 mg total) by mouth every evening. 90 tablet 1    atorvastatin (LIPITOR) 40 MG tablet Take 1 tablet (40 mg total) by mouth once daily. 90 tablet 3    coenzyme Q10 100 mg capsule Take 100 mg by mouth every morning.      colchicine (COLCRYS) 0.6 mg tablet Take 0.3 mg by mouth every other day.      diltiaZEM (CARDIZEM CD) 120 MG Cp24 Take 1 capsule (120 mg total) " by mouth once daily. 90 capsule 3    diphenoxylate-atropine 2.5-0.025 mg (LOMOTIL) 2.5-0.025 mg per tablet Take 1 tablet by mouth daily as needed.      ferrous sulfate 325 (65 FE) MG EC tablet Take 325 mg by mouth once daily.      fish oil-omega-3 fatty acids 300-1,000 mg capsule Take 2 capsules by mouth every morning.      fluticasone propionate (FLONASE) 50 mcg/actuation nasal spray 1 spray by Each Nostril route once daily.      fluticasone-salmeterol diskus inhaler 250-50 mcg Inhale 1 puff into the lungs 2 (two) times a day.      fluticasone-umeclidin-vilanter (TRELEGY ELLIPTA) 100-62.5-25 mcg DsDv Inhale 1 puff into the lungs once daily.      hydrALAZINE (APRESOLINE) 50 MG tablet Take 1 tablet (50 mg total) by mouth every 12 (twelve) hours. 60 tablet 0    ipratropium-albuteroL (COMBIVENT RESPIMAT)  mcg/actuation inhaler Inhale 2 puffs into the lungs every 4 (four) hours as needed for Shortness of Breath. Rescue 12 g 3    iron,carbonyl/ascorbic acid (VITRON-C ORAL) Take 1 tablet by mouth once daily.      isosorbide mononitrate (IMDUR) 120 MG 24 hr tablet Take 1 tablet (120 mg total) by mouth once daily. 90 tablet 1    ketoconazole (NIZORAL) 2 % cream Apply 1 application  topically 2 (two) times daily.      loperamide (IMODIUM) 2 mg capsule Take 2 mg by mouth 4 (four) times daily.      metoprolol tartrate (LOPRESSOR) 50 MG tablet Take 1 tablet (50 mg total) by mouth 2 (two) times daily. 180 tablet 3    mupirocin (BACTROBAN) 2 % ointment Apply topically 2 (two) times daily. (Patient taking differently: Apply 1 g topically 2 (two) times daily.) 30 g 3    naloxone (NARCAN) 4 mg/actuation Spry 1 spray by Nasal route once.      nebulizer and compressor Reshma as directed 1 each 0    nitroGLYCERIN 0.4 MG/DOSE TL SPRY (NITROLINGUAL) 400 mcg/spray spray Place 1 spray under the tongue every 5 (five) minutes as needed for Chest pain (max of 3 doses). 4.9 g 1    nystatin (MYCOSTATIN) powder Apply 1 g topically 3 (three)  times daily.      pantoprazole (PROTONIX) 40 MG tablet Take 1 tablet (40 mg total) by mouth once daily. 90 tablet 3    polycarbophil (FIBERCON) 625 mg tablet Take 625 mg by mouth once daily.      potassium chloride (MICRO-K) 10 MEQ CpSR Take 10 mEq by mouth once daily.      ranolazine (RANEXA) 500 MG Tb12 Take 1 tablet (500 mg total) by mouth 2 (two) times daily. 180 tablet 1    spironolactone (ALDACTONE) 25 MG tablet Take 25 mg by mouth.      triamcinolone acetonide 0.1% (KENALOG) 0.1 % cream Apply 1 g topically 2 (two) times daily.      umeclidinium (INCRUSE ELLIPTA) 62.5 mcg/actuation inhalation capsule Inhale 62.5 mcg into the lungs every morning. Controller 90 each 3    vit C/E/Zn/coppr/lutein/zeaxan (PRESERVISION AREDS-2 ORAL) Take 1 tablet by mouth once daily.      aspirin (ECOTRIN) 81 MG EC tablet Take 1 tablet (81 mg total) by mouth every morning. (Patient not taking: Reported on 7/22/2023) 90 tablet 3    ergocalciferol (VITAMIN D2) 50,000 unit Cap Take 1 capsule (50,000 Units total) by mouth every 7 days. (Patient taking differently: Take 50,000 Units by mouth every 7 days. SUNDAYS) 12 capsule 1    furosemide (LASIX) 40 MG tablet Take 1 tablet (40 mg total) by mouth once daily. (Patient not taking: Reported on 7/22/2023) 30 tablet 0    gabapentin (NEURONTIN) 100 MG capsule Take 1 capsule by mouth once daily.      HYDROcodone-acetaminophen (NORCO) 5-325 mg per tablet Take 1 tablet by mouth every 12 (twelve) hours as needed.      lipase-protease-amylase (CREON) 36,000-114,000- 180,000 unit CpDR Take 1 capsule by mouth 4 (four) times daily with meals and nightly.      lipase-protease-amylase 6,000-19,000-30,000 units (CREON) 6,000-19,000 -30,000 unit capsule Take 1 capsule by mouth 4 (four) times daily with meals and nightly.      metFORMIN (GLUCOPHAGE) 500 MG tablet Take 500 mg by mouth daily with breakfast.      temazepam (RESTORIL) 15 mg Cap Take 1 capsule by mouth once daily.      ticagrelor (BRILINTA) 90  mg tablet Take 1 tablet (90 mg total) by mouth every 12 (twelve) hours. 60 tablet 5    traMADoL (ULTRAM) 50 mg tablet Take 1 tablet by mouth 3 times daily as needed.      [DISCONTINUED] amoxicillin-clavulanate 875-125mg (AUGMENTIN) 875-125 mg per tablet Take 1 tablet by mouth 2 (two) times daily.      [DISCONTINUED] azithromycin (Z-ROSA) 250 MG tablet       [DISCONTINUED] benazepriL (LOTENSIN) 40 MG tablet Take 1 tablet (40 mg total) by mouth once daily. 90 tablet 1    [DISCONTINUED] bromfenac (PROLENSA) 0.07 % Drop       [DISCONTINUED] cholecalciferol, vitamin D3, 125 mcg (5,000 unit) Tab Take 5,000 Units by mouth once daily.      [DISCONTINUED] cholestyramine (QUESTRAN) 4 gram packet Take 1 packet (4 g total) by mouth 2 (two) times daily. 180 packet 3    [DISCONTINUED] ciclopirox (LOPROX) 0.77 % Crea       [DISCONTINUED] cimetidine (TAGAMET) 300 MG tablet Take 1 tablet (300 mg total) by mouth every 6 (six) hours. Take 1 tablet (300 mg total) by mouth starting at 6 PM on Sunday April 17, 2022 (the evening before your angiogram procedure). Then take 1 tablet at 12 AM, then take 1 tablet at 6 AM on Monday April 18th. 3 tablet 0    [DISCONTINUED] ciprofloxacin HCl (CIPRO) 250 MG tablet       [DISCONTINUED] ciprofloxacin HCl (CIPRO) 500 MG tablet       [DISCONTINUED] ciprofloxacin-dexAMETHasone 0.3-0.1% (CIPRODEX) 0.3-0.1 % DrpS       [DISCONTINUED] clindamycin (CLEOCIN) 150 MG capsule       [DISCONTINUED] difluprednate (DUREZOL) 0.05 % Drop ophthalmic solution       [DISCONTINUED] doxycycline (VIBRAMYCIN) 100 MG Cap       [DISCONTINUED] HYDROcodone-acetaminophen (NORCO)  mg per tablet       [DISCONTINUED] lisinopriL 10 MG tablet Take 1 tablet (10 mg total) by mouth once daily. HOLD UNTIL OTHERWISE DIRECTED BY PRIMARY CARE PROVIDER 90 tablet 3    [DISCONTINUED] lovastatin (MEVACOR) 40 MG tablet Take 1 tablet (40 mg total) by mouth every other day. 45 tablet 3    [DISCONTINUED] meloxicam (MOBIC) 7.5 MG tablet Take  1 tablet by mouth once daily.      [DISCONTINUED] metroNIDAZOLE (FLAGYL) 500 MG tablet       [DISCONTINUED] ofloxacin (OCUFLOX) 0.3 % ophthalmic solution       [DISCONTINUED] ondansetron (ZOFRAN-ODT) 4 MG TbDL Take 1 tablet (4 mg total) by mouth every 6 (six) hours as needed (nausea). 30 tablet 0    [DISCONTINUED] predniSONE (DELTASONE) 20 MG tablet TAKE 1 TABLEY BY MOUTH AS INSTRUCTED. THEN TAKE 3 TABLETS BY MOUTH THE NIGHT BEFORE PROCEDURE AND 3 TABLETS BY MOUTH THE MORNING OF PROCEDURE.      [DISCONTINUED] predniSONE (DELTASONE) 50 MG Tab          Potential issues to be addressed PRIOR TO DISCHARGE  Patient reported not taking the following medications: (Lasix, Hydrocodone, Creon, Temazepam & Tramadol). These medications remain on the home medication list. Please address accordingly.     Francisca Mcdonald  EXT 1924

## 2023-07-22 NOTE — CONSULTS
"[facility]  Adult Nutrition   Consult Note (Initial Assessment)     SUMMARY     Recommendations  Recommendation/Intervention: 1. Recommend Ensure HP with meals. 2. Continue current diet. 3. RD to monitor intake, weight and labs.  Goals: Improve po intake to meet 75% of EEN/EPN. Maintain weight.    Dietitian Rounds Brief  Consult for poor intake and weight loss.   Pt states she has good appetite and fair intake. Drinks Ensure for BK and will drink withmeals too if her intake is poor. Pt states she has some weight loss over the past month but unsure on how much. States she prepares her meals at home for her and her daughter.    Diet order:   Current Diet Order: regular diet                 Evaluation of Received Nutrient/Fluid Intake  Energy Calories Required: meeting needs  Protein Required: meeting needs  Fluid Required: meeting needs  Tolerance: tolerating     % Intake of Estimated Energy Needs: 50 - 75 %  % Meal Intake: 50 - 75 %      Intake/Output Summary (Last 24 hours) at 7/22/2023 1635  Last data filed at 7/22/2023 1440  Gross per 24 hour   Intake 222 ml   Output 3000 ml   Net -2778 ml        Anthropometrics  Temp: 98.1 °F (36.7 °C)  Height: 5' 4" (162.6 cm)  Height (inches): 64 in  Weight Method: Bed Scale  Weight: 69.6 kg (153 lb 7 oz)  Weight (lb): 153.44 lb  Ideal Body Weight (IBW), Female: 120 lb       Estimated/Assessed Needs  Weight Used For Calorie Calculations: 69.6 kg (153 lb 7 oz)  Energy Calorie Requirements (kcal): 9293-5439 kcal (25-30 kcal/ kg bw)  Energy Need Method: Kcal/kg  Protein Requirements: 70-83g (1.0-1.2g/ kg bw)  Weight Used For Protein Calculations: 69.6 kg (153 lb 7 oz)        RDA Method (mL): 1740       Reason for Assessment  Reason For Assessment: identified at risk by screening criteria  Relevant Medical History: HTN and SOB at home  Interdisciplinary Rounds: did not attend    Nutrition/Diet History  Patient Reported Diet/Restrictions/Preferences: heart " healthy  Food Allergies: NKFA  Factors Affecting Nutritional Intake: None identified at this time    Nutrition Risk Screen  Nutrition Risk Screen: no indicators present     MST Score: 3  Have you recently lost weight without trying?: Unsure  Weight loss score: 2  Have you been eating poorly because of a decreased appetite?: Yes  Appetite score: 1       Weight History:  Wt Readings from Last 10 Encounters:   07/22/23 69.6 kg (153 lb 7 oz)   05/26/23 68.8 kg (151 lb 10.8 oz)   05/26/23 68.5 kg (151 lb)   04/16/23 73 kg (161 lb)   03/28/23 73 kg (161 lb)   01/30/23 81.4 kg (179 lb 7.3 oz)   01/24/23 83.1 kg (183 lb 3.2 oz)   01/16/23 84.3 kg (185 lb 13.6 oz)   01/17/23 83.9 kg (185 lb)   01/10/23 79.7 kg (175 lb 12.8 oz)        Lab/Procedures/Meds: Pertinent Labs/Meds Reviewed    Medications:Pertinent Medications Reviewed  Scheduled Meds:   [START ON 7/23/2023] atorvastatin  40 mg Oral Daily    bumetanide  1 mg Intravenous Daily    isosorbide mononitrate  120 mg Oral Daily    metoprolol tartrate  50 mg Oral BID    pantoprazole  40 mg Intravenous BID    ranolazine  500 mg Oral BID    ticagrelor  90 mg Oral Q12H     Continuous Infusions:  PRN Meds:.dextrose 50%, dextrose 50%, glucagon (human recombinant), glucose, glucose, naloxone, sodium chloride 0.9%    Labs: Pertinent Labs Reviewed  Clinical Chemistry:  Recent Labs   Lab 07/21/23 2058      K 4.7      CO2 28   GLU 95   BUN 24*   CREATININE 1.5*   CALCIUM 8.8   PROT 6.3   ALBUMIN 3.0*   BILITOT 0.6   ALKPHOS 201*   AST 28   ALT 37   ANIONGAP 7*     CBC:   Recent Labs   Lab 07/21/23 2058   WBC 7.72   RBC 2.96*   HGB 8.6*   HCT 28.6*      MCV 97   MCH 29.1   MCHC 30.1*     Lipid Panel:  Recent Labs   Lab 07/22/23  1549   CHOL 96*   HDL 44   LDLCALC 31.8*   TRIG 101   CHOLHDL 45.8     Cardiac Profile:  Recent Labs   Lab 07/21/23  2058   *     Inflammatory Labs:  No results for input(s): CRP in the last 168 hours.  Diabetes:  No results for  input(s): HGBA1C, POCTGLUCOSE in the last 168 hours.  Thyroid & Parathyroid:  No results for input(s): TSH, FREET4, L0WFYYJ, R0HRORN, THYROIDAB in the last 168 hours.    Monitor and Evaluation        Nutrition Risk  Level of Risk/Frequency of Follow-up: moderate     Nutrition Follow-Up  RD Follow-up?: Yes      Rebeca Aguiar RD 07/22/2023 4:35 PM

## 2023-07-22 NOTE — PROGRESS NOTES
[facility]  Progress Note    Patient Name: Marisol Kerr  MRN: [id number]  Patient Class: IP- Inpatient   Admission Date: 7/21/2023  Length of Stay: 0 days  Attending Physician: Nkechi Liu MD  Primary Care Provider: Khadar Dwyer MD        Subjective:     Principal Problem:Acute CHF        Interval History:  Feeling better since admission.  Diuresed well with Lasix.  Intermittent chest pain ongoing.  Denies any blood in stool or history of GI bleed.  She is no longer taking aspirin or Plavix.    Review of Systems Complete ROS otherwise negative other than stated in HPI.   Objective:     Vital Signs (Most Recent):  Temp: 97.6 °F (36.4 °C) (07/21/23 2006)  Pulse: 73 (07/22/23 0957)  Resp: 20 (07/22/23 0957)  BP: (!) 175/82 (07/22/23 0957)  SpO2: 98 % (07/22/23 0957) Vital Signs (24h Range):  Temp:  [97.6 °F (36.4 °C)] 97.6 °F (36.4 °C)  Pulse:  [63-84] 73  Resp:  [17-25] 20  SpO2:  [93 %-100 %] 98 %  BP: (146-175)/(67-82) 175/82     Weight: 69.6 kg (153 lb 7 oz)  Body mass index is 26.34 kg/m².    Intake/Output Summary (Last 24 hours) at 7/22/2023 1244  Last data filed at 7/22/2023 0515  Gross per 24 hour   Intake --   Output 2700 ml   Net -2700 ml         Physical Exam  GENERAL:  Alert and oriented x 3.  Hard of hearing  HEENT:  EOMI. Conjunctivae intact. Posterior pharynx clear, oral mucosa moist  NECK:  Supple   LUNGS:  No respiratory distress.  Decreased breath sounds in bases, otherwise Clear to auscultation bilaterally  CARDIAC:  RRR   ABDOMEN:  Soft,  Nontender and nondistended, no rebound or guarding, bowel sounds present   EXTREMITIES:  Peripheral pulses are 2+. Hands and feet are warm. Good capillary refill in fingers (< 2 seconds).  1+ pitting edema bilaterally          Significant Labs: All pertinent labs within the past 24 hours have been reviewed.  CBC:   Recent Labs   Lab 07/21/23 2058   WBC 7.72   HGB 8.6*   HCT 28.6*        CMP:   Recent Labs   Lab  07/21/23 2058      K 4.7      CO2 28   GLU 95   BUN 24*   CREATININE 1.5*   CALCIUM 8.8   PROT 6.3   ALBUMIN 3.0*   BILITOT 0.6   ALKPHOS 201*   AST 28   ALT 37   ANIONGAP 7*     Cardiac Markers:   Recent Labs   Lab 07/21/23 2058   *       Significant Imaging: I have reviewed all pertinent imaging results/findings within the past 24 hours.      Assessment/Plan:      * Acute CHF  Diuresing well.  IV Bumex for diuresis and monitor renal function closely.  Continue oxygen for associated hypoxia    Shortness of breath        COPD/emphysema        Coronary artery disease, multivessel with history of previous PCI        Essential hypertension, benign  Continue on metoprolol and Imdur        VTE Risk Mitigation (From admission, onward)         Ordered     IP VTE HIGH RISK PATIENT  Once         07/21/23 2252     Place sequential compression device  Until discontinued         07/21/23 2252                Discharge Planning   LUZ:      Code Status: Full Code   Is the patient medically ready for discharge?:     Reason for patient still in hospital (select all that apply): Patient trending condition and Treatment                     Nekchi Liu MD  Department of Hospital Medicine   [facility]

## 2023-07-22 NOTE — PLAN OF CARE
07/22/23 1729   Patient Assessment/Suction   Level of Consciousness (AVPU) alert   Respiratory Effort Unlabored;Normal   Expansion/Accessory Muscles/Retractions no use of accessory muscles   All Lung Fields Breath Sounds Anterior:;Lateral:;coarse;crackles, coarse   Rhythm/Pattern, Respiratory unlabored   Cough Frequency infrequent   Cough Type nonproductive   PRE-TX-O2   Device (Oxygen Therapy) nasal cannula   $ Is the patient on Low Flow Oxygen? Yes   Flow (L/min) 4   SpO2 98 %   Pulse Oximetry Type Continuous   $ Pulse Oximetry - Multiple Charge Pulse Oximetry - Multiple   Oximetry Probe Site Assessed   Pulse 69   Resp 15   Aerosol Therapy   $ Aerosol Therapy Charges Aerosol Treatment   Daily Review of Necessity (SVN) completed   Respiratory Treatment Status (SVN) given   Treatment Route (SVN) mask;oxygen   Patient Position (SVN) HOB elevated   Post Treatment Assessment (SVN) breath sounds improved   Signs of Intolerance (SVN) none   Breath Sounds Post-Respiratory Treatment   Throughout All Fields Post-Treatment All Fields   Throughout All Fields Post-Treatment aeration increased   Post-treatment Heart Rate (beats/min) 72   Post-treatment Resp Rate (breaths/min) 20   Education   $ Education Bronchodilator;15 min   Respiratory Evaluation   $ Care Plan Tech Time 15 min   $ Eval/Re-eval Charges Evaluation   Evaluation For New Orders   Home Oxygen   Has Home Oxygen? Yes   Liter Flow 4   Route nasal cannula   Device home concentrator with portable unit

## 2023-07-22 NOTE — ASSESSMENT & PLAN NOTE
Diuresing well.  IV Bumex for diuresis and monitor renal function closely.  Continue oxygen for associated hypoxia

## 2023-07-22 NOTE — CONSULTS
[facility]  Cardiology  Consult Note    Patient Name: Marisol Kerr  MRN: [id number]  Admission Date: 7/21/2023  Hospital Length of Stay: 0 days  Code Status: Full Code   Attending Provider: Nkechi Liu MD   Consulting Provider: Khadar Burt MD  Primary Care Physician: Khadar Dwyer MD  Principal Problem:Acute CHF    Patient information was obtained from patient and ER records.     Consults  Subjective:     Chief Complaint:  Shortness of breath     HPI:  Problem on-patient has shortness of breath-she has a history of severe ASCVD and is an ex cigarette smoker  She has a history of heart failure and significant coronary artery disease  She had been on diuretics but these were discontinued.  She had renal failure in January due to urinary tract infection and was on dialysis for short time  2. She has severe coronary artery disease in his not felt to be candidate for stenting  Despite this EKGs show nonspecific changes and echo shows normal ejection fraction and only mild pulmonary hypertension  She is done much better on large doses of beta-blockers  3. She has significant peripheral vascular disease by physical examination  4. She has edema partially due to heart failure preserved ejection fraction partially due to ranolazine     Past Medical History:   Diagnosis Date    CAD (coronary artery disease)     Cancer     colon    CHF (congestive heart failure)     Colitis     Colon cancer     COPD (chronic obstructive pulmonary disease)     Decreased hearing     Diabetes mellitus     Diabetes mellitus, type 2     Hypercholesterolemia     Hypertension     Hypoxemia     Insomnia     Obesity     Osteoarthritis        Past Surgical History:   Procedure Laterality Date    ANGIOGRAM, CORONARY, WITH LEFT HEART CATHETERIZATION N/A 2/10/2022    Procedure: Angiogram, Coronary, with Left Heart Cath;  Surgeon: Cristian Benjamin MD;  Location: [facility] CATH/EP LAB;  Service: Cardiology;  Laterality: N/A;    CARDIAC  CATHETERIZATION      CHOLECYSTECTOMY      COLECTOMY      CORONARY ANGIOPLASTY      CORONARY STENT PLACEMENT      ERCP N/A 1/16/2023    Procedure: ERCP (ENDOSCOPIC RETROGRADE CHOLANGIOPANCREATOGRAPHY);  Surgeon: Biju Kramer III, MD;  Location: [facility];  Service: Endoscopy;  Laterality: N/A;    ESOPHAGOGASTRODUODENOSCOPY N/A 1/29/2023    Procedure: EGD (ESOPHAGOGASTRODUODENOSCOPY);  Surgeon: Aarti Alvarez MD;  Location: [facility] ENDO;  Service: Endoscopy;  Laterality: N/A;    EXTERNAL EAR SURGERY      EYE SURGERY      FLEXIBLE SIGMOIDOSCOPY N/A 9/19/2019    Procedure: SIGMOIDOSCOPY, FLEXIBLE;  Surgeon: Biju Kramer III, MD;  Location: [facility];  Service: Endoscopy;  Laterality: N/A;    HYSTERECTOMY      partial       Review of patient's allergies indicates:   Allergen Reactions    Iodinated contrast media     Strawberries [strawberry] Hives and Itching       No current facility-administered medications on file prior to encounter.     Current Outpatient Medications on File Prior to Encounter   Medication Sig    albuterol (VENTOLIN HFA) 90 mcg/actuation inhaler Inhale 2 puffs into the lungs every 6 (six) hours as needed for Wheezing or Shortness of Breath. Rescue    albuterol-ipratropium (DUO-NEB) 2.5 mg-0.5 mg/3 mL nebulizer solution Take 3 mLs by nebulization every 4 (four) hours as needed for Wheezing or Shortness of Breath. Rescue    allopurinoL (ZYLOPRIM) 100 MG tablet Take 0.5 tablets (50 mg total) by mouth once daily.    ALPRAZolam (XANAX) 0.25 MG tablet Take 1 tablet (0.25 mg total) by mouth every evening.    atorvastatin (LIPITOR) 40 MG tablet Take 1 tablet (40 mg total) by mouth once daily.    coenzyme Q10 100 mg capsule Take 100 mg by mouth every morning.    colchicine (COLCRYS) 0.6 mg tablet Take 0.3 mg by mouth every other day.    diltiaZEM (CARDIZEM CD) 120 MG Cp24 Take 1 capsule (120 mg total) by mouth once daily.    diphenoxylate-atropine 2.5-0.025 mg (LOMOTIL) 2.5-0.025 mg per tablet  Take 1 tablet by mouth daily as needed.    ferrous sulfate 325 (65 FE) MG EC tablet Take 325 mg by mouth once daily.    fish oil-omega-3 fatty acids 300-1,000 mg capsule Take 2 capsules by mouth every morning.    fluticasone propionate (FLONASE) 50 mcg/actuation nasal spray 1 spray by Each Nostril route once daily.    fluticasone-salmeterol diskus inhaler 250-50 mcg Inhale 1 puff into the lungs 2 (two) times a day.    fluticasone-umeclidin-vilanter (TRELEGY ELLIPTA) 100-62.5-25 mcg DsDv Inhale 1 puff into the lungs once daily.    hydrALAZINE (APRESOLINE) 50 MG tablet Take 1 tablet (50 mg total) by mouth every 12 (twelve) hours.    ipratropium-albuteroL (COMBIVENT RESPIMAT)  mcg/actuation inhaler Inhale 2 puffs into the lungs every 4 (four) hours as needed for Shortness of Breath. Rescue    iron,carbonyl/ascorbic acid (VITRON-C ORAL) Take 1 tablet by mouth once daily.    isosorbide mononitrate (IMDUR) 120 MG 24 hr tablet Take 1 tablet (120 mg total) by mouth once daily.    ketoconazole (NIZORAL) 2 % cream Apply 1 application  topically 2 (two) times daily.    loperamide (IMODIUM) 2 mg capsule Take 2 mg by mouth 4 (four) times daily.    metoprolol tartrate (LOPRESSOR) 50 MG tablet Take 1 tablet (50 mg total) by mouth 2 (two) times daily.    mupirocin (BACTROBAN) 2 % ointment Apply topically 2 (two) times daily. (Patient taking differently: Apply 1 g topically 2 (two) times daily.)    naloxone (NARCAN) 4 mg/actuation Spry 1 spray by Nasal route once.    nebulizer and compressor Reshma as directed    nitroGLYCERIN 0.4 MG/DOSE TL SPRY (NITROLINGUAL) 400 mcg/spray spray Place 1 spray under the tongue every 5 (five) minutes as needed for Chest pain (max of 3 doses).    nystatin (MYCOSTATIN) powder Apply 1 g topically 3 (three) times daily.    pantoprazole (PROTONIX) 40 MG tablet Take 1 tablet (40 mg total) by mouth once daily.    polycarbophil (FIBERCON) 625 mg tablet Take 625 mg by mouth once daily.    potassium  chloride (MICRO-K) 10 MEQ CpSR Take 10 mEq by mouth once daily.    ranolazine (RANEXA) 500 MG Tb12 Take 1 tablet (500 mg total) by mouth 2 (two) times daily.    spironolactone (ALDACTONE) 25 MG tablet Take 25 mg by mouth.    triamcinolone acetonide 0.1% (KENALOG) 0.1 % cream Apply 1 g topically 2 (two) times daily.    umeclidinium (INCRUSE ELLIPTA) 62.5 mcg/actuation inhalation capsule Inhale 62.5 mcg into the lungs every morning. Controller    vit C/E/Zn/coppr/lutein/zeaxan (PRESERVISION AREDS-2 ORAL) Take 1 tablet by mouth once daily.    aspirin (ECOTRIN) 81 MG EC tablet Take 1 tablet (81 mg total) by mouth every morning. (Patient not taking: Reported on 7/22/2023)    ergocalciferol (VITAMIN D2) 50,000 unit Cap Take 1 capsule (50,000 Units total) by mouth every 7 days. (Patient taking differently: Take 50,000 Units by mouth every 7 days. SUNDAYS)    furosemide (LASIX) 40 MG tablet Take 1 tablet (40 mg total) by mouth once daily. (Patient not taking: Reported on 7/22/2023)    gabapentin (NEURONTIN) 100 MG capsule Take 1 capsule by mouth once daily.    HYDROcodone-acetaminophen (NORCO) 5-325 mg per tablet Take 1 tablet by mouth every 12 (twelve) hours as needed.    lipase-protease-amylase (CREON) 36,000-114,000- 180,000 unit CpDR Take 1 capsule by mouth 4 (four) times daily with meals and nightly.    lipase-protease-amylase 6,000-19,000-30,000 units (CREON) 6,000-19,000 -30,000 unit capsule Take 1 capsule by mouth 4 (four) times daily with meals and nightly.    metFORMIN (GLUCOPHAGE) 500 MG tablet Take 500 mg by mouth daily with breakfast.    temazepam (RESTORIL) 15 mg Cap Take 1 capsule by mouth once daily.    ticagrelor (BRILINTA) 90 mg tablet Take 1 tablet (90 mg total) by mouth every 12 (twelve) hours.    traMADoL (ULTRAM) 50 mg tablet Take 1 tablet by mouth 3 times daily as needed.    [DISCONTINUED] amoxicillin-clavulanate 875-125mg (AUGMENTIN) 875-125 mg per tablet Take 1 tablet by mouth 2 (two) times daily.     [DISCONTINUED] azithromycin (Z-ROSA) 250 MG tablet     [DISCONTINUED] benazepriL (LOTENSIN) 40 MG tablet Take 1 tablet (40 mg total) by mouth once daily.    [DISCONTINUED] bromfenac (PROLENSA) 0.07 % Drop     [DISCONTINUED] cholecalciferol, vitamin D3, 125 mcg (5,000 unit) Tab Take 5,000 Units by mouth once daily.    [DISCONTINUED] cholestyramine (QUESTRAN) 4 gram packet Take 1 packet (4 g total) by mouth 2 (two) times daily.    [DISCONTINUED] ciclopirox (LOPROX) 0.77 % Crea     [DISCONTINUED] cimetidine (TAGAMET) 300 MG tablet Take 1 tablet (300 mg total) by mouth every 6 (six) hours. Take 1 tablet (300 mg total) by mouth starting at 6 PM on Sunday April 17, 2022 (the evening before your angiogram procedure). Then take 1 tablet at 12 AM, then take 1 tablet at 6 AM on Monday April 18th.    [DISCONTINUED] ciprofloxacin HCl (CIPRO) 250 MG tablet     [DISCONTINUED] ciprofloxacin HCl (CIPRO) 500 MG tablet     [DISCONTINUED] ciprofloxacin-dexAMETHasone 0.3-0.1% (CIPRODEX) 0.3-0.1 % DrpS     [DISCONTINUED] clindamycin (CLEOCIN) 150 MG capsule     [DISCONTINUED] difluprednate (DUREZOL) 0.05 % Drop ophthalmic solution     [DISCONTINUED] doxycycline (VIBRAMYCIN) 100 MG Cap     [DISCONTINUED] HYDROcodone-acetaminophen (NORCO)  mg per tablet     [DISCONTINUED] lisinopriL 10 MG tablet Take 1 tablet (10 mg total) by mouth once daily. HOLD UNTIL OTHERWISE DIRECTED BY PRIMARY CARE PROVIDER    [DISCONTINUED] lovastatin (MEVACOR) 40 MG tablet Take 1 tablet (40 mg total) by mouth every other day.    [DISCONTINUED] meloxicam (MOBIC) 7.5 MG tablet Take 1 tablet by mouth once daily.    [DISCONTINUED] metroNIDAZOLE (FLAGYL) 500 MG tablet     [DISCONTINUED] ofloxacin (OCUFLOX) 0.3 % ophthalmic solution     [DISCONTINUED] ondansetron (ZOFRAN-ODT) 4 MG TbDL Take 1 tablet (4 mg total) by mouth every 6 (six) hours as needed (nausea).    [DISCONTINUED] predniSONE (DELTASONE) 20 MG tablet TAKE 1 TABLEY BY MOUTH AS INSTRUCTED. THEN TAKE  3 TABLETS BY MOUTH THE NIGHT BEFORE PROCEDURE AND 3 TABLETS BY MOUTH THE MORNING OF PROCEDURE.    [DISCONTINUED] predniSONE (DELTASONE) 50 MG Tab      Family History       Problem Relation (Age of Onset)    Febrile seizures Mother    Heart failure Father          Tobacco Use    Smoking status: Former     Packs/day: 3.00     Years: 50.00     Pack years: 150.00     Types: Cigarettes     Start date: 3/30/1964     Quit date: 2006     Years since quittin.4    Smokeless tobacco: Never    Tobacco comments:     ex smoker 15 years   Substance and Sexual Activity    Alcohol use: Yes    Drug use: No    Sexual activity: Never     Review of Systems   Constitutional: Positive for weight gain.   Eyes:  Positive for blurred vision.   Cardiovascular:  Positive for chest pain, claudication and leg swelling.   Respiratory:  Positive for hemoptysis, shortness of breath and sleep disturbances due to breathing.    Skin:  Positive for color change, nail changes, poor wound healing and rash.   Objective:     Vital Signs (Most Recent):  Temp: 98.1 °F (36.7 °C) (23 1438)  Pulse: 66 (23 1438)  Resp: 18 (23 1438)  BP: (!) 102/56 (23 1438)  SpO2: 98 % (23) Vital Signs (24h Range):  Temp:  [97.6 °F (36.4 °C)-98.1 °F (36.7 °C)] 98.1 °F (36.7 °C)  Pulse:  [63-84] 66  Resp:  [17-25] 18  SpO2:  [93 %-100 %] 98 %  BP: (102-175)/(56-82) 102/56     Weight: 69.6 kg (153 lb 7 oz)  Body mass index is 26.34 kg/m².    SpO2: 98 %         Intake/Output Summary (Last 24 hours) at 2023 1538  Last data filed at 2023 1440  Gross per 24 hour   Intake 222 ml   Output 3000 ml   Net -2778 ml       Lines/Drains/Airways       Peripheral Intravenous Line  Duration                  Peripheral IV - Single Lumen 23 20 G Anterior;Right Forearm <1 day                    Physical Exam 78-year-old woman older appearing stated age  Pulse is 68  Lungs few crackles  Cardiac decreased breath sounds S4 no  S3    Abdomen obese  Extremities edema with discoloration and some weeping sores  Significantly decreased femoral pulses      Significant Labs: CMP   Recent Labs   Lab 23      K 4.7      CO2 28   GLU 95   BUN 24*   CREATININE 1.5*   CALCIUM 8.8   PROT 6.3   ALBUMIN 3.0*   BILITOT 0.6   ALKPHOS 201*   AST 28   ALT 37   ANIONGAP 7*   , CBC   Recent Labs   Lab 23   WBC 7.72   HGB 8.6*   HCT 28.6*      , and Troponin No results for input(s): TROPONINI in the last 48 hours.    Significant Imagin. She has a history of ASCVD with cardiac catheterization year last year demonstrates  The RPAV lesion was 100% stenosed.  The RPDA lesion was 100% stenosed.  The Prox Cx to Mid Cx lesion was 80% stenosed.  The Dist Cx lesion was 70% stenosed.  The 1st LPL lesion was 90% stenosed.  The Prox RCA-2 lesion was 70% stenosed.  The Prox RCA-1 lesion was 90% stenosed.  The Mid LAD-2 lesion was 60% stenosed.  The estimated blood loss was <50 mL.  There was three vessel coronary artery disease.  With multiple stents  Assessment and Plan:   1. Mild heart failure  Continue diuresis and switch over to p.o. diuretics q.o.d.   2. Severe ASCVD with symptoms controlled with nitrates beta-blockers and ranolazine  3. Severe COPD  4. Patient has anemia -even though iron levels at lower limits of normal her saturation is decreased and ferritin levels transferrin levels are slightly low  Recommend IV iron therapy for her should help improve her cardiopulmonary status  Active Diagnoses:    Diagnosis Date Noted POA    PRINCIPAL PROBLEM:  Acute CHF [I50.9] 2023 Yes    Heart failure with preserved ejection fraction [I50.30] 2023 Unknown    PVD (peripheral vascular disease) [I73.9] 2023 Unknown    Drug reaction [T50.905A] 2023 Unknown    COPD/emphysema [J44.9] 2019 Yes     Chronic    ASCVD (arteriosclerotic cardiovascular disease) [I25.10] 2013 Yes    Essential hypertension,  benign [I10] 04/09/2013 Yes      Problems Resolved During this Admission:     I substantially and  personally reviewed old and new ecg's, lab reports,, xray reports  and  other cardiovascular studies including  echo's, stress tests, angiogram reports, holters,and vascular studies .  In addition I evaluated original cardiac cath  ___echo  ___x_cxr mild congestion ______ct ____scan on [employer] or [employer] or other viewing platforms and  __x__EKG's .  Sinus rhythm no acute changes  I reviewed  office and hospital notes Yes __x __ of  referring providers and other providers.  I reviewed personally old hospital and office notes   Time spent evaluating and managing this patient:____35 ___min.     VTE Risk Mitigation (From admission, onward)           Ordered     IP VTE HIGH RISK PATIENT  Once         07/21/23 2252     Place sequential compression device  Until discontinued         07/21/23 2252                    Thank you for your consult.     Khadar Burt MD  Cardiology   [facility]

## 2023-07-22 NOTE — ED PROVIDER NOTES
Encounter Date: 7/21/2023       History     Chief Complaint   Patient presents with    Chest Pain     X3 days    Shortness of Breath     X3 days. 3L NC @ home. Home health nurse referred pt to come to ED due to ELBERT edema on legs and SOB.      HPI  Marisol Kerr is a 78 year old female with PMH of COPD on 2-3L NC, HFpEF, 3v CAD s/p PCI, T2DM, HTN presents to the ED with her daughter after being advised to present to the ER for shortness of breath by her home health nurse.  Patient states that over the past 1-2 days she has had approximately 8-10 lb weight gain as well as symmetric bilateral lower extremity edema with an approximate doubling in size of her lower legs.  Difficult to ascertain if the patient has had increasing orthopnea and she sleeps in a hospital bed and has the head elevated baseline.  She has been compliant with her inhalers, no significantly increased wheezing or need for nebulizer treatments.  No fevers, chills, cough.  No history of DVT/PE.       Review of patient's allergies indicates:   Allergen Reactions    Iodinated contrast media     Strawberries [strawberry] Hives and Itching     Past Medical History:   Diagnosis Date    CAD (coronary artery disease)     Cancer     colon    CHF (congestive heart failure)     Colitis     Colon cancer     COPD (chronic obstructive pulmonary disease)     Decreased hearing     Diabetes mellitus     Diabetes mellitus, type 2     Hypercholesterolemia     Hypertension     Hypoxemia     Insomnia     Obesity     Osteoarthritis      Past Surgical History:   Procedure Laterality Date    ANGIOGRAM, CORONARY, WITH LEFT HEART CATHETERIZATION N/A 2/10/2022    Procedure: Angiogram, Coronary, with Left Heart Cath;  Surgeon: Cristian Benjamin MD;  Location: [facility] CATH/EP LAB;  Service: Cardiology;  Laterality: N/A;    CARDIAC CATHETERIZATION      CHOLECYSTECTOMY      COLECTOMY      CORONARY ANGIOPLASTY      CORONARY STENT PLACEMENT      ERCP N/A 1/16/2023    Procedure: ERCP  (ENDOSCOPIC RETROGRADE CHOLANGIOPANCREATOGRAPHY);  Surgeon: Biju Kramer III, MD;  Location: [facility] ENDO;  Service: Endoscopy;  Laterality: N/A;    ESOPHAGOGASTRODUODENOSCOPY N/A 2023    Procedure: EGD (ESOPHAGOGASTRODUODENOSCOPY);  Surgeon: Aarti Alvarez MD;  Location: [facility] ENDO;  Service: Endoscopy;  Laterality: N/A;    EXTERNAL EAR SURGERY      EYE SURGERY      FLEXIBLE SIGMOIDOSCOPY N/A 2019    Procedure: SIGMOIDOSCOPY, FLEXIBLE;  Surgeon: Biju Kramer III, MD;  Location: [facility] ENDO;  Service: Endoscopy;  Laterality: N/A;    HYSTERECTOMY      partial     Family History   Problem Relation Age of Onset    Febrile seizures Mother     Heart failure Father      Social History     Tobacco Use    Smoking status: Former     Packs/day: 3.00     Years: 50.00     Pack years: 150.00     Types: Cigarettes     Start date: 3/30/1964     Quit date: 2006     Years since quittin.4    Smokeless tobacco: Never    Tobacco comments:     ex smoker 15 years   Substance Use Topics    Alcohol use: Yes    Drug use: No     Review of Systems   Constitutional:  Negative for activity change, appetite change, chills, diaphoresis and fever.   HENT: Negative.     Eyes:  Negative for visual disturbance.   Respiratory:  Positive for shortness of breath. Negative for cough and wheezing.    Cardiovascular:  Positive for chest pain and leg swelling. Negative for palpitations.   Gastrointestinal:  Negative for abdominal distention, abdominal pain, nausea and vomiting.   Genitourinary:  Negative for decreased urine volume, difficulty urinating and frequency.   Musculoskeletal:  Negative for joint swelling.   Skin:  Negative for rash and wound.   Allergic/Immunologic: Negative for environmental allergies and food allergies.   Neurological:  Negative for syncope, weakness and light-headedness.     Physical Exam     Initial Vitals [23]   BP Pulse Resp Temp SpO2   (!) 175/80 64 20 97.6 °F (36.4 °C) (!) 93 %       MAP       --         Physical Exam    Nursing note and vitals reviewed.  Constitutional: She appears well-developed and well-nourished. No distress.   HENT:   Head: Normocephalic and atraumatic.   Right Ear: External ear normal.   Left Ear: External ear normal.   Hard of hearing. Hearing aid in place to R ear.    Eyes: Conjunctivae and EOM are normal. Right eye exhibits no discharge. Left eye exhibits no discharge.   Neck: Neck supple. No JVD present.   Normal range of motion.  Cardiovascular:  Normal rate, regular rhythm and normal heart sounds.     Exam reveals no gallop and no friction rub.       No murmur heard.  Pulmonary/Chest: She has no wheezes. She has rales (bilateral bases to mid-lungs posteriorly).   Abdominal: Abdomen is soft. She exhibits no distension. There is no abdominal tenderness. There is no guarding.   Musculoskeletal:         General: Edema (2+ BLE to knees.) present.      Cervical back: Normal range of motion and neck supple.     Neurological: She is alert and oriented to person, place, and time. No cranial nerve deficit. GCS score is 15. GCS eye subscore is 4. GCS verbal subscore is 5. GCS motor subscore is 6.   Skin: Skin is warm and dry. Capillary refill takes less than 2 seconds. There is pallor (ble).   Psychiatric: She has a normal mood and affect. Thought content normal.       ED Course   Procedures  Labs Reviewed   CBC W/ AUTO DIFFERENTIAL - Abnormal; Notable for the following components:       Result Value    RBC 2.96 (*)     Hemoglobin 8.6 (*)     Hematocrit 28.6 (*)     MCHC 30.1 (*)     RDW 15.2 (*)     Gran % 74.3 (*)     Lymph % 16.5 (*)     All other components within normal limits   COMPREHENSIVE METABOLIC PANEL - Abnormal; Notable for the following components:    BUN 24 (*)     Creatinine 1.5 (*)     Albumin 3.0 (*)     Alkaline Phosphatase 201 (*)     eGFR 35.9 (*)     Anion Gap 7 (*)     All other components within normal limits   B-TYPE NATRIURETIC PEPTIDE - Abnormal;  Notable for the following components:     (*)     All other components within normal limits   IRON AND TIBC - Abnormal; Notable for the following components:    Transferrin 182 (*)     Saturated Iron 18 (*)     All other components within normal limits   ISTAT PROCEDURE - Abnormal; Notable for the following components:    POC PCO2 55.9 (*)     POC HCO3 34.5 (*)     POC SATURATED O2 85 (*)     POC TCO2 36 (*)     All other components within normal limits   TROPONIN I HIGH SENSITIVITY   PROTIME-INR   TROPONIN I HIGH SENSITIVITY   URINALYSIS, REFLEX TO URINE CULTURE    Narrative:     Specimen Source->Urine   IRON AND TIBC   FERRITIN   FERRITIN   OCCULT BLOOD X 1, STOOL     EKG Readings: (Independently Interpreted)   Initial Reading: No STEMI.   NSR, axis normal, intervals normal, septal q waves. No ST-T changes. Q waves new from immediate prior EKG from 5/28/2023 but present in EKG on 5/26/23 during same admission.   ECG Results              EKG 12-lead (In process)  Result time 07/22/23 05:16:13      In process by Interface, Lab In [facility] (07/22/23 05:16:13)                   Narrative:    Test Reason : R07.9,    Vent. Rate : 062 BPM     Atrial Rate : 062 BPM     P-R Int : 140 ms          QRS Dur : 096 ms      QT Int : 432 ms       P-R-T Axes : 065 068 051 degrees     QTc Int : 438 ms    Normal sinus rhythm  Septal infarct ,age undetermined  Abnormal ECG  When compared with ECG of 28-MAY-2023 12:24,  Septal infarct is now Present    Referred By: [email or web address]   SELF           Confirmed By:                                   Imaging Results              X-Ray Chest AP Portable (In process)                   X-Rays:   Independently Interpreted Readings:   Other Readings:  Cephalization, interstitial opacities b/l consistent with pulmonary edema. Cardiomegaly.   Medications   sodium chloride 0.9% flush 10 mL (has no administration in time range)   naloxone 0.4 mg/mL injection 0.02 mg (has no administration in time  range)   glucose chewable tablet 16 g (has no administration in time range)   glucose chewable tablet 24 g (has no administration in time range)   dextrose 50% injection 12.5 g (has no administration in time range)   dextrose 50% injection 25 g (has no administration in time range)   glucagon (human recombinant) injection 1 mg (has no administration in time range)   pantoprazole injection 40 mg (40 mg Intravenous Given 7/22/23 7307)   isosorbide mononitrate 24 hr tablet 120 mg (has no administration in time range)   ranolazine 12 hr tablet 500 mg (has no administration in time range)   metoprolol tartrate (LOPRESSOR) tablet 50 mg (has no administration in time range)   ticagrelor tablet 90 mg (has no administration in time range)   furosemide injection 120 mg (120 mg Intravenous Given 7/21/23 2125)   captopril split tablet 6.25 mg (6.25 mg Oral Given 7/21/23 2125)     Medical Decision Making:   History:   I obtained history from: someone other than patient.       <> Summary of History: Daughter  Old Medical Records: I decided to obtain old medical records.  Old Records Summarized: records from previous admission(s) and records from clinic visits.  Initial Assessment:   78 Female presents for increasing SOB and weight gain with mildly increased O2 requirement. No wheezing or neb response at home. Rales on exam but in no respiratory distress, on 4L NC. BLE edema increased from typical appearance per daughter. Significantly hypertensive, daughter reports BP labile.   Differential Diagnosis:   ACS, heart failure exacerbation, volume overload, lymphedema, COPD exacerbation, pneumonia.   Independently Interpreted Test(s):   I have ordered and independently interpreted X-rays - see prior notes.  I have ordered and independently interpreted EKG Reading(s) - see prior notes  Clinical Tests:   Lab Tests: Ordered and Reviewed       <> Summary of Lab: Hgb 8.6 with progressive downtrend. .   Radiological Study: Ordered  and Reviewed  Medical Tests: Ordered and Reviewed  ED Management:  78 year old female with PMH of HFpEF, CKD3, COPD on 3L home o2 presents for 1-2 days of increasing SOB with 8-10 lb weight gain. Exam with rales. CXR with cephalization and pulmonary edema, lower suspicion of COPD exacerbation at this time. No evidence of pneumonia currently. Anemia with progression over months but no s/s of active bleeding. EKG with q waves as seen previously in setting of known 3v CAD not amenable to high risk PCI nor CABG. Troponin negative x2. Suspect CHF exacerbation at this time. Given 120 mg IV lasix to initiate diuresis as well as 6.25 mg captopril to aid in afterload reduction. Patient will need continued observation and inpatient diuresis. Hospital medicine consulted and accepts patient to their service for further care.     Dereje Soriano DO  [facility] Internal Medicine/Emergency Medicine HO-IV    Portions of the record may have been created with voice recognition software. Occasional wrong-word or 'sound-a-like' substitutions may have occurred due to the inherent limitations of voice recognition software. Read the chart carefully and recognize, using context, where substitutions have occurred                 ED Course as of 07/22/23 0840   Fri Jul 21, 2023 2124 Creatinine(!): 1.5  Near recent baseline [MJ]      ED Course User Index  [MJ] Dereje Soriano DO                 Clinical Impression:   Final diagnoses:  [R07.9] Chest pain  [E87.79] Cardiac volume overload (Primary)        ED Disposition Condition    Admit                 Dereje Soriano DO  Resident  07/22/23 0840

## 2023-07-23 VITALS
OXYGEN SATURATION: 99 % | WEIGHT: 150.56 LBS | SYSTOLIC BLOOD PRESSURE: 171 MMHG | RESPIRATION RATE: 18 BRPM | HEART RATE: 66 BPM | TEMPERATURE: 98 F | BODY MASS INDEX: 25.7 KG/M2 | HEIGHT: 64 IN | DIASTOLIC BLOOD PRESSURE: 72 MMHG

## 2023-07-23 LAB
ANION GAP SERPL CALC-SCNC: 5 MMOL/L (ref 8–16)
BUN SERPL-MCNC: 30 MG/DL (ref 8–23)
CALCIUM SERPL-MCNC: 8.3 MG/DL (ref 8.7–10.5)
CHLORIDE SERPL-SCNC: 100 MMOL/L (ref 95–110)
CO2 SERPL-SCNC: 34 MMOL/L (ref 23–29)
CREAT SERPL-MCNC: 1.7 MG/DL (ref 0.5–1.4)
ERYTHROCYTE [DISTWIDTH] IN BLOOD BY AUTOMATED COUNT: 14.9 % (ref 11.5–14.5)
EST. GFR  (NO RACE VARIABLE): 30.9 ML/MIN/1.73 M^2
GLUCOSE SERPL-MCNC: 93 MG/DL (ref 70–110)
HCT VFR BLD AUTO: 26.2 % (ref 37–48.5)
HGB BLD-MCNC: 8 G/DL (ref 12–16)
MAGNESIUM SERPL-MCNC: 1.7 MG/DL (ref 1.6–2.6)
MCH RBC QN AUTO: 28.9 PG (ref 27–31)
MCHC RBC AUTO-ENTMCNC: 30.5 G/DL (ref 32–36)
MCV RBC AUTO: 95 FL (ref 82–98)
PLATELET # BLD AUTO: 214 K/UL (ref 150–450)
PMV BLD AUTO: 12 FL (ref 9.2–12.9)
POTASSIUM SERPL-SCNC: 4.4 MMOL/L (ref 3.5–5.1)
RBC # BLD AUTO: 2.77 M/UL (ref 4–5.4)
SODIUM SERPL-SCNC: 139 MMOL/L (ref 136–145)
WBC # BLD AUTO: 6.87 K/UL (ref 3.9–12.7)

## 2023-07-23 PROCEDURE — 99232 SBSQ HOSP IP/OBS MODERATE 35: CPT | Mod: ,,, | Performed by: SPECIALIST

## 2023-07-23 PROCEDURE — 25000003 PHARM REV CODE 250: Performed by: SPECIALIST

## 2023-07-23 PROCEDURE — 25000003 PHARM REV CODE 250: Performed by: INTERNAL MEDICINE

## 2023-07-23 PROCEDURE — 83735 ASSAY OF MAGNESIUM: CPT | Performed by: HOSPITALIST

## 2023-07-23 PROCEDURE — 94761 N-INVAS EAR/PLS OXIMETRY MLT: CPT | Mod: XB

## 2023-07-23 PROCEDURE — 94640 AIRWAY INHALATION TREATMENT: CPT

## 2023-07-23 PROCEDURE — 85027 COMPLETE CBC AUTOMATED: CPT | Performed by: HOSPITALIST

## 2023-07-23 PROCEDURE — 25000242 PHARM REV CODE 250 ALT 637 W/ HCPCS: Performed by: HOSPITALIST

## 2023-07-23 PROCEDURE — 80048 BASIC METABOLIC PNL TOTAL CA: CPT | Performed by: HOSPITALIST

## 2023-07-23 PROCEDURE — 27000221 HC OXYGEN, UP TO 24 HOURS

## 2023-07-23 PROCEDURE — 99232 PR SUBSEQUENT HOSPITAL CARE,LEVL II: ICD-10-PCS | Mod: ,,, | Performed by: SPECIALIST

## 2023-07-23 PROCEDURE — 36415 COLL VENOUS BLD VENIPUNCTURE: CPT | Performed by: HOSPITALIST

## 2023-07-23 RX ORDER — PANTOPRAZOLE SODIUM 40 MG/1
40 TABLET, DELAYED RELEASE ORAL 2 TIMES DAILY
Status: DISCONTINUED | OUTPATIENT
Start: 2023-07-23 | End: 2023-07-23 | Stop reason: HOSPADM

## 2023-07-23 RX ORDER — HYDRALAZINE HYDROCHLORIDE 25 MG/1
25 TABLET, FILM COATED ORAL EVERY 8 HOURS
Status: CANCELLED | OUTPATIENT
Start: 2023-07-23

## 2023-07-23 RX ORDER — FUROSEMIDE 40 MG/1
40 TABLET ORAL DAILY
Qty: 30 TABLET | Refills: 0 | Status: ON HOLD | OUTPATIENT
Start: 2023-07-23 | End: 2023-07-29 | Stop reason: HOSPADM

## 2023-07-23 RX ADMIN — RANOLAZINE 500 MG: 500 TABLET, EXTENDED RELEASE ORAL at 08:07

## 2023-07-23 RX ADMIN — IPRATROPIUM BROMIDE AND ALBUTEROL SULFATE 3 ML: .5; 3 SOLUTION RESPIRATORY (INHALATION) at 07:07

## 2023-07-23 RX ADMIN — METOPROLOL TARTRATE 50 MG: 50 TABLET, FILM COATED ORAL at 08:07

## 2023-07-23 RX ADMIN — ISOSORBIDE MONONITRATE 120 MG: 60 TABLET, EXTENDED RELEASE ORAL at 08:07

## 2023-07-23 RX ADMIN — TICAGRELOR 90 MG: 90 TABLET ORAL at 08:07

## 2023-07-23 RX ADMIN — ATORVASTATIN CALCIUM 40 MG: 40 TABLET, FILM COATED ORAL at 08:07

## 2023-07-23 NOTE — CARE UPDATE
07/23/23 0742   Patient Assessment/Suction   Level of Consciousness (AVPU) alert   Respiratory Effort Unlabored;Normal   Expansion/Accessory Muscles/Retractions no use of accessory muscles   All Lung Fields Breath Sounds coarse   Rhythm/Pattern, Respiratory pattern regular;depth regular   Cough Frequency no cough   PRE-TX-O2   Device (Oxygen Therapy) nasal cannula   $ Is the patient on Low Flow Oxygen? Yes   Flow (L/min) 4   SpO2 98 %   Pulse Oximetry Type Intermittent   $ Pulse Oximetry - Multiple Charge Pulse Oximetry - Multiple   Pulse 68   Resp 18   Aerosol Therapy   $ Aerosol Therapy Charges Aerosol Treatment   Respiratory Treatment Status (SVN) given   Treatment Route (SVN) mask   Patient Position (SVN) HOB elevated   Signs of Intolerance (SVN) none   Breath Sounds Post-Respiratory Treatment   Throughout All Fields Post-Treatment All Fields   Throughout All Fields Post-Treatment aeration increased   Post-treatment Heart Rate (beats/min) 71   Post-treatment Resp Rate (breaths/min) 18

## 2023-07-23 NOTE — PLAN OF CARE
07/23/23 0954   Final Note   Assessment Type Final Discharge Note   Anticipated Discharge Disposition Home   What phone number can be called within the next 1-3 days to see how you are doing after discharge? [phone]   Post-Acute Status   Discharge Delays None known at this time     Patient cleared for discharge from case management standpoint.  Chart and discharge orders reviewed.  Patient discharged home with no further case management needs.

## 2023-07-23 NOTE — DISCHARGE SUMMARY
[facility] Medicine  Discharge Summary      Patient Name: Marisol Kerr  MRN: [id number]  YURY: [id number]  Patient Class: IP- Inpatient  Admission Date: 7/21/2023  Hospital Length of Stay: 1 days  Discharge Date and Time:  07/23/2023 2:55 PM  Attending Physician: No att. providers found   Discharging Provider: Woody Méndez MD  Primary Care Provider: Khadar Dwyer MD    Primary Care Team: Networked reference to record PCT     HPI:  77-year-old female history of multivessel CAD with stent on DAPT, very severe COPD, chronic diastolic CHF, chronic hypoxemic respiratory failure on oxygen, obesity, CKD 3, history of cholangitis status post ERCP with sphincterotomy and bile duct stone removal, type 2 diabetes.      She presents to ER presents to the ED with her daughter after being advised to present to the ER for shortness of breath by her home health nurse.+ve 8-10lb weight gain. Chronic lymphedema so legs usually swollen but theyre now twice their normal size.        In ER , Cr 1.5 (baseline 1.4-1.6). Uncontrolled /74      Hospital Course:   78-year-old woman was admitted with acute CHF.  She diuresed well with IV Lasix.  She was followed by Cardiology.  Her symptoms improved and she was discharged home in stable condition on her previous oral diuretic regimen.  She will follow up with Cardiology.       Goals of Care Treatment Preferences:  Code Status: Full Code              Consults:   Consults (From admission, onward)        Status Ordering Provider     Inpatient consult to Registered Dietitian/Nutritionist  Once        Provider:  (Not yet assigned)    Completed WOODY MÉNDEZ     Inpatient consult to Cardiology  Once        Provider:  Khadar Burt MD    Acknowledged WOODY MÉNDEZ          No new Assessment & Plan notes have been filed under this hospital service since the last note was generated.  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    Specialties: Family Medicine, Home Health Services, Hospice Services  Contact information:  [address]  [address]  [facility] [address]  [phone]                       Patient Instructions:      Diet Cardiac     Activity as tolerated       Significant Diagnostic Studies: N/A    Pending Diagnostic Studies:     None         Medications:  Reconciled Home Medications:      Medication List      CONTINUE taking these medications    albuterol 90 mcg/actuation inhaler  Commonly known as: VENTOLIN HFA  Inhale 2 puffs into the lungs every 6 (six) hours as needed for Wheezing or Shortness of Breath. Rescue     * albuterol-ipratropium 2.5 mg-0.5 mg/3 mL nebulizer solution  Commonly known as: DUO-NEB  Take 3 mLs by nebulization every 4 (four) hours as needed for Wheezing or Shortness of Breath. Rescue     * CombiVENT RESPIMAT  mcg/actuation inhaler  Generic drug: ipratropium-albuteroL  Inhale 2 puffs into the lungs every 4 (four) hours as needed for Shortness of Breath. Rescue     allopurinoL 100 MG tablet  Commonly known as: ZYLOPRIM  Take 0.5 tablets (50 mg total) by mouth once daily.     ALPRAZolam 0.25 MG tablet  Commonly known as: XANAX  Take 1 tablet (0.25 mg total) by mouth every evening.     atorvastatin 40 MG tablet  Commonly known as: LIPITOR  Take 1  tablet (40 mg total) by mouth once daily.     coenzyme Q10 100 mg capsule  Take 100 mg by mouth every morning.     colchicine 0.6 mg tablet  Commonly known as: COLCRYS  Take 0.3 mg by mouth every other day.     diphenoxylate-atropine 2.5-0.025 mg 2.5-0.025 mg per tablet  Commonly known as: LOMOTIL  Take 1 tablet by mouth daily as needed.     ergocalciferol 50,000 unit Cap  Commonly known as: VITAMIN D2  Take 1 capsule (50,000 Units total) by mouth every 7 days.     ferrous sulfate 325 (65 FE) MG EC tablet  Take 325 mg by mouth once daily.     fish oil-omega-3 fatty acids 300-1,000 mg capsule  Take 2 capsules by mouth every morning.     fluticasone propionate 50 mcg/actuation nasal spray  Commonly known as: FLONASE  1 spray by Each Nostril route once daily.     fluticasone-salmeterol 250-50 mcg/dose 250-50 mcg/dose diskus inhaler  Commonly known as: ADVAIR  Inhale 1 puff into the lungs 2 (two) times a day.     fluticasone-umeclidin-vilanter 100-62.5-25 mcg Dsdv  Commonly known as: TRELEGY ELLIPTA  Inhale 1 puff into the lungs once daily.     furosemide 40 MG tablet  Commonly known as: LASIX  Take 1 tablet (40 mg total) by mouth once daily.     INCRUSE ELLIPTA 62.5 mcg/actuation inhalation capsule  Generic drug: umeclidinium  Inhale 62.5 mcg into the lungs every morning. Controller     isosorbide mononitrate 120 MG 24 hr tablet  Commonly known as: IMDUR  Take 1 tablet (120 mg total) by mouth once daily.     ketoconazole 2 % cream  Commonly known as: NIZORAL  Apply 1 application  topically 2 (two) times daily.     loperamide 2 mg capsule  Commonly known as: IMODIUM  Take 2 mg by mouth 4 (four) times daily.     metoprolol tartrate 50 MG tablet  Commonly known as: LOPRESSOR  Take 1 tablet (50 mg total) by mouth 2 (two) times daily.     mupirocin 2 % ointment  Commonly known as: BACTROBAN  Apply topically 2 (two) times daily.     nebulizer and compressor Reshma  as directed     nitroGLYCERIN 0.4 MG/DOSE TL SPRY 400  mcg/spray spray  Commonly known as: NITROLINGUAL  Place 1 spray under the tongue every 5 (five) minutes as needed for Chest pain (max of 3 doses).     nystatin powder  Commonly known as: MYCOSTATIN  Apply 1 g topically 3 (three) times daily.     pantoprazole 40 MG tablet  Commonly known as: PROTONIX  Take 1 tablet (40 mg total) by mouth once daily.     polycarbophil 625 mg tablet  Commonly known as: FIBERCON  Take 625 mg by mouth once daily.     potassium chloride 10 MEQ Cpsr  Commonly known as: MICRO-K  Take 10 mEq by mouth once daily.     PRESERVISION AREDS-2 ORAL  Take 1 tablet by mouth once daily.     ranolazine 500 MG Tb12  Commonly known as: RANEXA  Take 1 tablet (500 mg total) by mouth 2 (two) times daily.     ticagrelor 90 mg tablet  Commonly known as: BRILINTA  Take 1 tablet (90 mg total) by mouth every 12 (twelve) hours.     triamcinolone acetonide 0.1% 0.1 % cream  Commonly known as: KENALOG  Apply 1 g topically 2 (two) times daily.     VITRON-C ORAL  Take 1 tablet by mouth once daily.         * This list has 2 medication(s) that are the same as other medications prescribed for you. Read the directions carefully, and ask your doctor or other care provider to review them with you.            STOP taking these medications    aspirin 81 MG EC tablet  Commonly known as: ECOTRIN     CREON 36,000-114,000- 180,000 unit Cpdr  Generic drug: lipase-protease-amylase     CREON 6,000-19,000 -30,000 unit capsule  Generic drug: lipase-protease-amylase 6,000-19,000-30,000 units     diltiaZEM 120 MG Cp24  Commonly known as: CARDIZEM CD     gabapentin 100 MG capsule  Commonly known as: NEURONTIN     hydrALAZINE 50 MG tablet  Commonly known as: APRESOLINE     HYDROcodone-acetaminophen 5-325 mg per tablet  Commonly known as: NORCO     metFORMIN 500 MG tablet  Commonly known as: GLUCOPHAGE     naloxone 4 mg/actuation Spry  Commonly known as: NARCAN     spironolactone 25 MG tablet  Commonly known as: ALDACTONE     temazepam 15  mg Cap  Commonly known as: RESTORIL     traMADoL 50 mg tablet  Commonly known as: ULTRAM            Indwelling Lines/Drains at time of discharge:   Lines/Drains/Airways     None                 Time spent on the discharge of patient: 32 minutes         Nkechi Liu MD  Department of Hospital Medicine  Formerly [facility], later [facility]

## 2023-07-23 NOTE — PLAN OF CARE
Patient will return home upon discharge. Patient will resume services with [facility].No other needs were identified.           07/23/23 0932   Final Note   Assessment Type Final Discharge Note   Anticipated Discharge Disposition Home   What phone number can be called within the next 1-3 days to see how you are doing after discharge? [phone]   Post-Acute Status   Post-Acute Authorization Home Health   Coverage Medicare Part A & B   Discharge Delays None known at this time

## 2023-07-23 NOTE — PLAN OF CARE
07/22/23 2022   Patient Assessment/Suction   Level of Consciousness (AVPU) alert   Respiratory Effort Unlabored   Expansion/Accessory Muscles/Retractions expansion symmetric   All Lung Fields Breath Sounds coarse   Rhythm/Pattern, Respiratory depth regular;pattern regular   Cough Frequency infrequent   Cough Type good;nonproductive   PRE-TX-O2   Device (Oxygen Therapy) nasal cannula   $ Is the patient on Low Flow Oxygen? Yes   Flow (L/min) 4   SpO2 96 %   Pulse Oximetry Type Continuous   Education   $ Education DME Oxygen;15 min

## 2023-07-23 NOTE — PLAN OF CARE
[facility]  Initial Discharge Assessment       Primary Care Provider: Khadar Dwyer MD    Admission Diagnosis: Chest pain [R07.9]    Admission Date: 7/21/2023  Expected Discharge Date: 07/23/2023  Met with patient at bedside to complete assessment. Information on Face sheet was verified. Patient uses [facility]. No dialysis or Coumadin. Patient's daughter Sandrita Kerr/ [phone] will bring patient home upon discharge. No anticipated needs at this time.        Transition of Care Barriers: None    Payor: MEDICARE / Plan: MEDICARE PART A & B / Product Type: Government /     Extended Emergency Contact Information  Primary Emergency Contact: Marisol Kerr  Address: [address]           [place] [address] [place]  Mobile Phone: [phone]  Relation: Daughter  Preferred language: English   needed? No    Discharge Plan A: Home  Discharge Plan B: Home with family      [facility] [address] [place] [address] [address]  [address] [facility] [address]  Phone: [phone] Fax: [phone]    [facility] - [place], [place] - [address] [facility]  [address] [facility] [address]  Phone: [phone] Fax: [phone]      Initial Assessment (most recent)       Adult Discharge Assessment - 07/23/23 0918          Discharge Assessment    Assessment Type Discharge Planning Assessment     Confirmed/corrected address, phone number and insurance Yes     Confirmed Demographics Correct on Facesheet     Source of Information patient     Reason For Admission Acute CHF     People in Home alone     Facility Arrived From: home     Do you have help at home or someone to help you manage your care at home? Yes     Who are your caregiver(s) and their phone number(s)? Marisol Kerr/daughter [phone]     Prior to hospitilization cognitive status: Alert/Oriented;No Deficits     Current cognitive status: Alert/Oriented;No Deficits      Equipment Currently Used at Home walker, rolling;wheelchair     Patient currently being followed by outpatient case management? No     Do you currently have service(s) that help you manage your care at home? Yes     How Many hours does patient receive services 4     Name and Contact number of [facility]     Is the pt/caregiver preference to resume services with current agency Yes     Do you take prescription medications? Yes     Do you have prescription coverage? Yes     Coverage Medicare Part B     Do you have any problems affording any of your prescribed medications? No     Is the patient taking medications as prescribed? yes     Who is going to help you get home at discharge? Marisol Kerr/daughter [phone]     How do you get to doctors appointments? car, drives self     Are you on dialysis? No     Do you take coumadin? No     Discharge Plan A Home     Discharge Plan B Home with family     DME Needed Upon Discharge  none     Discharge Plan discussed with: Patient     Transition of Care Barriers None        Physical Activity    On average, how many days per week do you engage in moderate to strenuous exercise (like a brisk walk)? 0 days     On average, how many minutes do you engage in exercise at this level? 0 min        Financial Resource Strain    How hard is it for you to pay for the very basics like food, housing, medical care, and heating? Not hard at all        Housing Stability    In the last 12 months, was there a time when you were not able to pay the mortgage or rent on time? No     In the last 12 months, how many places have you lived? 1     In the last 12 months, was there a time when you did not have a steady place to sleep or slept in a shelter (including now)? No        Transportation Needs    In the past 12 months, has lack of transportation kept you from medical appointments or from getting medications? No     In the past 12 months, has lack of transportation kept you  from meetings, work, or from getting things needed for daily living? No        Food Insecurity    Within the past 12 months, the food you bought just didn't last and you didn't have money to get more. Never true        Stress    Do you feel stress - tense, restless, nervous, or anxious, or unable to sleep at night because your mind is troubled all the time - these days? Rather much        Social Connections    In a typical week, how many times do you talk on the phone with family, friends, or neighbors? More than three times a week     How often do you get together with friends or relatives? More than three times a week     How often do you attend Christianity or Confucianism services? Never     Do you belong to any clubs or organizations such as Christianity groups, unions, fraternal or athletic groups, or school groups? No     How often do you attend meetings of the clubs or organizations you belong to? Never     Are you , , , , never , or living with a partner?         Alcohol Use    Q1: How often do you have a drink containing alcohol? Never     Q2: How many drinks containing alcohol do you have on a typical day when you are drinking? Patient does not drink     Q3: How often do you have six or more drinks on one occasion? Never

## 2023-07-23 NOTE — PROGRESS NOTES
Pharmacist Intervention IV to PO Note    Marisol Kerr is a 78 y.o. female being treated with IV medication pantoprazole    Patient Data:    Vital Signs (Most Recent):  Temp: 97.5 °F (36.4 °C) (07/23/23 0609)  Pulse: 68 (07/23/23 0742)  Resp: 18 (07/23/23 0742)  BP: (!) 179/77 (nurse notified) (07/23/23 0609)  SpO2: 98 % (07/23/23 0742) Vital Signs (72h Range):  Temp:  [97.5 °F (36.4 °C)-98.5 °F (36.9 °C)]   Pulse:  [60-84]   Resp:  [14-25]   BP: (102-179)/(56-82)   SpO2:  [93 %-100 %]      CBC:  Recent Labs   Lab 07/21/23 2058 07/23/23  0333   WBC 7.72 6.87   RBC 2.96* 2.77*   HGB 8.6* 8.0*   HCT 28.6* 26.2*    214   MCV 97 95   MCH 29.1 28.9   MCHC 30.1* 30.5*     CMP:     Recent Labs   Lab 07/21/23 2058 07/23/23  0333   GLU 95 93   CALCIUM 8.8 8.3*   ALBUMIN 3.0*  --    PROT 6.3  --     139   K 4.7 4.4   CO2 28 34*    100   BUN 24* 30*   CREATININE 1.5* 1.7*   ALKPHOS 201*  --    ALT 37  --    AST 28  --    BILITOT 0.6  --        Dietary Orders:  Diet Orders            Dietary nutrition supplements [facility]; Ensure Plus High Protein starting at 07/23 0745    Diet Adult Regular (IDDSI Level 7): Regular starting at 07/22 1126            Based on the following criteria, this patient qualifies for intravenous to oral conversion:  [x] The patients gastrointestinal tract is functioning (tolerating medications via oral or enteral route for 24 hours and tolerating food or enteral feeds for 24 hours).  [x] The patient is hemodynamically stable for 24 hours (heart rate <100 beats per minute, systolic blood pressure >99 mm Hg, and respiratory rate <20 breaths per minute).  [x] The patient shows clinical improvement (afebrile for at least 24 hours and white blood cell count downtrending or normalized). Additionally, the patient must be non-neutropenic (absolute neutrophil count >500 cells/mm3).    IV medication pantoprazole 20 mg BID will be changed to oral medication pantoprazole 20 mg BID  Pharmacist's  Name: Mik Berry  Pharmacist's Extension: [phone]

## 2023-07-23 NOTE — PLAN OF CARE
Patient will will return home upon discharge with no needs. Patient will resume services with [facility] upon discharge.     07/23/23 0932   Final Note   Assessment Type Final Discharge Note   Anticipated Discharge Disposition Home   What phone number can be called within the next 1-3 days to see how you are doing after discharge? [phone]   Post-Acute Status   Post-Acute Authorization Hospice   Coverage Medicare Part A & B   Discharge Delays None known at this time

## 2023-07-23 NOTE — CONSULTS
[facility]  Cardiology  Progress Note    Patient Name: Marisol Kerr  MRN: [id number]  Admission Date: 7/21/2023  Hospital Length of Stay: 1 days  Code Status: Full Code   Attending Physician: Nkechi Liu MD   Primary Care Physician: Khadar Dwyer MD  Expected Discharge Date: 7/23/2023  Principal Problem:Acute CHF    Subjective:     Hospital Course:  PATIENT IS FEELING BETTER    Interval History:  Blood pressure little bit elevated    ROS  Objective:     Vital Signs (Most Recent):  Temp: 98 °F (36.7 °C) (07/23/23 1031)  Pulse: 66 (07/23/23 1031)  Resp: 18 (07/23/23 1031)  BP: (!) 171/72 (nurse notified) (07/23/23 1031)  SpO2: 99 % (07/23/23 1031) Vital Signs (24h Range):  Temp:  [97.5 °F (36.4 °C)-98.5 °F (36.9 °C)] 98 °F (36.7 °C)  Pulse:  [60-76] 66  Resp:  [14-18] 18  SpO2:  [96 %-100 %] 99 %  BP: (102-179)/(56-77) 171/72     Weight: 68.3 kg (150 lb 9.2 oz)  Body mass index is 25.85 kg/m².    SpO2: 99 %         Intake/Output Summary (Last 24 hours) at 7/23/2023 1054  Last data filed at 7/23/2023 1035  Gross per 24 hour   Intake 569 ml   Output 1750 ml   Net -1181 ml       Lines/Drains/Airways       None                   Physical Exam lungs are clear  S4 no S3 regular rhythm  Legs slightly less edematous      Significant Labs: CMP   Recent Labs   Lab 07/21/23 2058 07/23/23 0333    139   K 4.7 4.4    100   CO2 28 34*   GLU 95 93   BUN 24* 30*   CREATININE 1.5* 1.7*   CALCIUM 8.8 8.3*   PROT 6.3  --    ALBUMIN 3.0*  --    BILITOT 0.6  --    ALKPHOS 201*  --    AST 28  --    ALT 37  --    ANIONGAP 7* 5*    and CBC   Recent Labs   Lab 07/21/23 2058 07/23/23  0333   WBC 7.72 6.87   HGB 8.6* 8.0*   HCT 28.6* 26.2*    214       Significant Imaging:   Assessment and Plan:   Hypertension and chronic kidney disease  Anemia-recommend IV iron which has been shown to be helpful in patients with coronary disease and heart failure  Stay on a low-sodium diet and she can be discharged on the  med she is on at home regularly and follow-up in the office  Brief HPI:     Active Diagnoses:    Diagnosis Date Noted POA    PRINCIPAL PROBLEM:  Acute CHF [I50.9] 07/22/2023 Yes    Heart failure with preserved ejection fraction [I50.30] 07/22/2023 Unknown    PVD (peripheral vascular disease) [I73.9] 07/22/2023 Unknown    Drug reaction [T50.905A] 07/22/2023 Unknown    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

## 2023-07-24 ENCOUNTER — TELEPHONE (OUTPATIENT)
Dept: FAMILY MEDICINE | Facility: CLINIC | Age: 78
End: 2023-07-24

## 2023-07-24 ENCOUNTER — PATIENT OUTREACH (OUTPATIENT)
Dept: ADMINISTRATIVE | Facility: CLINIC | Age: 78
End: 2023-07-24
Payer: MEDICARE

## 2023-07-24 NOTE — LETTER
[address] [facility] [address]  [place] [address]  Phone: [phone]   [phone]                        MD Nely Humphreys, MD Dereje Mays, PARossyC     Arlin Mcclendon, VICENTE Marquez, VICENTE Cain, VICENTE Leon, ALDO      Date: 07/24/2023        Patient: Marisol Kerr  YOB: 1945      Per Vickie Alexander to resume Home Health.         Sincerely,     Bhavna Walters LPN

## 2023-07-24 NOTE — PROGRESS NOTES
C3 nurse spoke with Marisol Kerr  for a TCC post hospital discharge follow up call. The patient reports does not have a scheduled HOSFU appointment. C3 nurse was unable to schedule HOSFU appointment for [facility]. Patient advised to contact their PCP to schedule a HOSPFU within 5-7 days.

## 2023-07-25 ENCOUNTER — TELEPHONE (OUTPATIENT)
Dept: FAMILY MEDICINE | Facility: CLINIC | Age: 78
End: 2023-07-25

## 2023-07-25 ENCOUNTER — PATIENT OUTREACH (OUTPATIENT)
Dept: FAMILY MEDICINE | Facility: CLINIC | Age: 78
End: 2023-07-25

## 2023-07-25 NOTE — TELEPHONE ENCOUNTER
----- Message from Anni López sent at 7/25/2023 10:16 AM CDT -----  Patient daughter Marisol called and stated that she was returning a call to the nurse Bhavna please give her a call at [phone]

## 2023-07-25 NOTE — PROGRESS NOTES
Discharge Information     Discharge Date:   7/23/23    Primary Discharge Diagnosis:  Acute CHF      Discharge Summary:  Reviewed      Medication & Order Review     Were medication changes made or new medications added?   Yes    If so, has the patient filled the prescriptions?  Yes     Was Home Health ordered? Pt already has HH    If so, has Home Health contacted patient and/or initiated services?  Already has HH    Name of Home Health Agency?  Already has HH    Durable Medical Equipment ordered?  No     If so, has the DME provider contacted patient and delivered equipment?  N/A    Follow Up               Any problems since discharge? No    How is the patient feeling since returning home?      Have you set up recommended follow up appointments?  They are calling to schedule appt with Cardiology.     Schedule Hospital Follow-up appointment within 7-14 days (preferably 7).      Notes:  Spoke with pt daughter, Marisol, pt reports doing well. Daughter checked on pt today. They are going to call the cardiologist tomorrow to schedule HFU.  Denies any complaints at this time. HFU scheduled.         Deepthi Soriano

## 2023-07-25 NOTE — TELEPHONE ENCOUNTER
Spoke with pt daughter, Marisol, pt reports doing well. Daughter checked on pt today. They are going to call the cardiologist tomorrow to schedule HFU.  Denies any complaints at this time. HFU scheduled.

## 2023-07-27 ENCOUNTER — CLINICAL SUPPORT (OUTPATIENT)
Dept: CARDIOLOGY | Facility: HOSPITAL | Age: 78
DRG: 303 | End: 2023-07-27
Attending: INTERNAL MEDICINE
Payer: MEDICARE

## 2023-07-27 ENCOUNTER — HOSPITAL ENCOUNTER (INPATIENT)
Facility: HOSPITAL | Age: 78
LOS: 1 days | Discharge: HOME HEALTH | DRG: 303 | End: 2023-07-29
Attending: STUDENT IN AN ORGANIZED HEALTH CARE EDUCATION/TRAINING PROGRAM | Admitting: FAMILY MEDICINE
Payer: MEDICARE

## 2023-07-27 VITALS — HEIGHT: 64 IN | WEIGHT: 150 LBS | BODY MASS INDEX: 25.61 KG/M2

## 2023-07-27 DIAGNOSIS — I25.10 CAD (CORONARY ARTERY DISEASE): ICD-10-CM

## 2023-07-27 DIAGNOSIS — I50.41 ACUTE COMBINED SYSTOLIC AND DIASTOLIC CONGESTIVE HEART FAILURE: ICD-10-CM

## 2023-07-27 DIAGNOSIS — I50.30 HEART FAILURE WITH PRESERVED EJECTION FRACTION, UNSPECIFIED HF CHRONICITY: ICD-10-CM

## 2023-07-27 DIAGNOSIS — R94.31 ABNORMAL EKG: ICD-10-CM

## 2023-07-27 DIAGNOSIS — I73.9 PVD (PERIPHERAL VASCULAR DISEASE): ICD-10-CM

## 2023-07-27 DIAGNOSIS — R79.89 ELEVATED TROPONIN: Primary | ICD-10-CM

## 2023-07-27 DIAGNOSIS — R07.9 CHEST PAIN: ICD-10-CM

## 2023-07-27 DIAGNOSIS — I25.10 ASCVD (ARTERIOSCLEROTIC CARDIOVASCULAR DISEASE): ICD-10-CM

## 2023-07-27 LAB
ALBUMIN SERPL BCP-MCNC: 3.2 G/DL (ref 3.5–5.2)
ALP SERPL-CCNC: 181 U/L (ref 55–135)
ALT SERPL W/O P-5'-P-CCNC: 18 U/L (ref 10–44)
ANION GAP SERPL CALC-SCNC: 10 MMOL/L (ref 8–16)
AST SERPL-CCNC: 22 U/L (ref 10–40)
AV INDEX (PROSTH): 0.62
AV MEAN GRADIENT: 5 MMHG
AV PEAK GRADIENT: 9 MMHG
AV VELOCITY RATIO: 0.67
BASOPHILS # BLD AUTO: 0.05 K/UL (ref 0–0.2)
BASOPHILS NFR BLD: 0.5 % (ref 0–1.9)
BILIRUB SERPL-MCNC: 0.6 MG/DL (ref 0.1–1)
BNP SERPL-MCNC: 195 PG/ML (ref 0–99)
BSA FOR ECHO PROCEDURE: 1.75 M2
BUN SERPL-MCNC: 37 MG/DL (ref 8–23)
CALCIUM SERPL-MCNC: 8.9 MG/DL (ref 8.7–10.5)
CHLORIDE SERPL-SCNC: 101 MMOL/L (ref 95–110)
CO2 SERPL-SCNC: 30 MMOL/L (ref 23–29)
CREAT SERPL-MCNC: 2 MG/DL (ref 0.5–1.4)
CV ECHO LV RWT: 0.37 CM
DIFFERENTIAL METHOD: ABNORMAL
DOP CALC AO PEAK VEL: 1.47 M/S
DOP CALC AO VTI: 36.3 CM
DOP CALC LVOT PEAK VEL: 0.99 M/S
DOP CALCLVOT PEAK VEL VTI: 22.6 CM
E WAVE DECELERATION TIME: 226 MSEC
E/A RATIO: 0.97
E/E' RATIO: 14 M/S
ECHO LV POSTERIOR WALL: 0.86 CM (ref 0.6–1.1)
EJECTION FRACTION: 60 %
EOSINOPHIL # BLD AUTO: 0.1 K/UL (ref 0–0.5)
EOSINOPHIL NFR BLD: 0.7 % (ref 0–8)
ERYTHROCYTE [DISTWIDTH] IN BLOOD BY AUTOMATED COUNT: 15.3 % (ref 11.5–14.5)
EST. GFR  (NO RACE VARIABLE): 25.4 ML/MIN/1.73 M^2
FRACTIONAL SHORTENING: 33 % (ref 28–44)
GLUCOSE SERPL-MCNC: 144 MG/DL (ref 70–110)
HCT VFR BLD AUTO: 28.2 % (ref 37–48.5)
HGB BLD-MCNC: 8.5 G/DL (ref 12–16)
IMM GRANULOCYTES # BLD AUTO: 0.04 K/UL (ref 0–0.04)
IMM GRANULOCYTES NFR BLD AUTO: 0.4 % (ref 0–0.5)
INTERVENTRICULAR SEPTUM: 0.82 CM (ref 0.6–1.1)
IVC DIAMETER: 0.99 CM
LEFT INTERNAL DIMENSION IN SYSTOLE: 3.13 CM (ref 2.1–4)
LEFT VENTRICLE DIASTOLIC VOLUME INDEX: 50.4 ML/M2
LEFT VENTRICLE DIASTOLIC VOLUME: 87.2 ML
LEFT VENTRICLE MASS INDEX: 75 G/M2
LEFT VENTRICLE SYSTOLIC VOLUME INDEX: 16 ML/M2
LEFT VENTRICLE SYSTOLIC VOLUME: 27.6 ML
LEFT VENTRICULAR INTERNAL DIMENSION IN DIASTOLE: 4.69 CM (ref 3.5–6)
LEFT VENTRICULAR MASS: 129.82 G
LV LATERAL E/E' RATIO: 15.17 M/S
LV SEPTAL E/E' RATIO: 13 M/S
LVOT MG: 2 MMHG
LVOT MV: 0.65 CM/S
LYMPHOCYTES # BLD AUTO: 0.9 K/UL (ref 1–4.8)
LYMPHOCYTES NFR BLD: 9.4 % (ref 18–48)
MAGNESIUM SERPL-MCNC: 2.1 MG/DL (ref 1.6–2.6)
MCH RBC QN AUTO: 28.8 PG (ref 27–31)
MCHC RBC AUTO-ENTMCNC: 30.1 G/DL (ref 32–36)
MCV RBC AUTO: 96 FL (ref 82–98)
MONOCYTES # BLD AUTO: 0.6 K/UL (ref 0.3–1)
MONOCYTES NFR BLD: 6 % (ref 4–15)
MV PEAK A VEL: 0.94 M/S
MV PEAK E VEL: 0.91 M/S
NEUTROPHILS # BLD AUTO: 7.7 K/UL (ref 1.8–7.7)
NEUTROPHILS NFR BLD: 83 % (ref 38–73)
NRBC BLD-RTO: 0 /100 WBC
OHS LV EJECTION FRACTION SIMPSONS BIPLANE MOD: 7 %
PISA TR MAX VEL: 2.07 M/S
PLATELET # BLD AUTO: 204 K/UL (ref 150–450)
PMV BLD AUTO: 11.7 FL (ref 9.2–12.9)
POTASSIUM SERPL-SCNC: 4.3 MMOL/L (ref 3.5–5.1)
PROT SERPL-MCNC: 6.3 G/DL (ref 6–8.4)
RA PRESSURE: 3 MMHG
RBC # BLD AUTO: 2.95 M/UL (ref 4–5.4)
SODIUM SERPL-SCNC: 141 MMOL/L (ref 136–145)
TDI LATERAL: 0.06 M/S
TDI SEPTAL: 0.07 M/S
TDI: 0.07 M/S
TR MAX PG: 17 MMHG
TROPONIN I SERPL HS-MCNC: 49.7 PG/ML (ref 0–14.9)
TROPONIN I SERPL HS-MCNC: 52.7 PG/ML (ref 0–14.9)
TROPONIN I SERPL HS-MCNC: 57.4 PG/ML (ref 0–14.9)
TROPONIN I SERPL HS-MCNC: 68.6 PG/ML (ref 0–14.9)
TV REST PULMONARY ARTERY PRESSURE: 20 MMHG
WBC # BLD AUTO: 9.22 K/UL (ref 3.9–12.7)

## 2023-07-27 PROCEDURE — 96372 THER/PROPH/DIAG INJ SC/IM: CPT | Performed by: INTERNAL MEDICINE

## 2023-07-27 PROCEDURE — 84484 ASSAY OF TROPONIN QUANT: CPT | Mod: 91 | Performed by: INTERNAL MEDICINE

## 2023-07-27 PROCEDURE — 25000242 PHARM REV CODE 250 ALT 637 W/ HCPCS: Performed by: INTERNAL MEDICINE

## 2023-07-27 PROCEDURE — 63600175 PHARM REV CODE 636 W HCPCS: Performed by: SPECIALIST

## 2023-07-27 PROCEDURE — 25000003 PHARM REV CODE 250: Performed by: INTERNAL MEDICINE

## 2023-07-27 PROCEDURE — 63600175 PHARM REV CODE 636 W HCPCS: Performed by: INTERNAL MEDICINE

## 2023-07-27 PROCEDURE — 25000242 PHARM REV CODE 250 ALT 637 W/ HCPCS: Performed by: STUDENT IN AN ORGANIZED HEALTH CARE EDUCATION/TRAINING PROGRAM

## 2023-07-27 PROCEDURE — 93010 EKG 12-LEAD: ICD-10-PCS | Mod: ,,, | Performed by: SPECIALIST

## 2023-07-27 PROCEDURE — 96365 THER/PROPH/DIAG IV INF INIT: CPT | Mod: 59

## 2023-07-27 PROCEDURE — 93308 TTE F-UP OR LMTD: CPT

## 2023-07-27 PROCEDURE — 93005 ELECTROCARDIOGRAM TRACING: CPT | Performed by: SPECIALIST

## 2023-07-27 PROCEDURE — 93308 TTE F-UP OR LMTD: CPT | Mod: 26,,, | Performed by: SPECIALIST

## 2023-07-27 PROCEDURE — G0378 HOSPITAL OBSERVATION PER HR: HCPCS

## 2023-07-27 PROCEDURE — 25000003 PHARM REV CODE 250: Performed by: NURSE PRACTITIONER

## 2023-07-27 PROCEDURE — 94640 AIRWAY INHALATION TREATMENT: CPT

## 2023-07-27 PROCEDURE — 93308 ECHO (CUPID ONLY): ICD-10-PCS | Mod: 26,,, | Performed by: SPECIALIST

## 2023-07-27 PROCEDURE — 85025 COMPLETE CBC W/AUTO DIFF WBC: CPT | Performed by: STUDENT IN AN ORGANIZED HEALTH CARE EDUCATION/TRAINING PROGRAM

## 2023-07-27 PROCEDURE — 93010 ELECTROCARDIOGRAM REPORT: CPT | Mod: ,,, | Performed by: SPECIALIST

## 2023-07-27 PROCEDURE — 99223 1ST HOSP IP/OBS HIGH 75: CPT | Mod: 25,,, | Performed by: SPECIALIST

## 2023-07-27 PROCEDURE — 27000221 HC OXYGEN, UP TO 24 HOURS

## 2023-07-27 PROCEDURE — 99900035 HC TECH TIME PER 15 MIN (STAT)

## 2023-07-27 PROCEDURE — 96372 THER/PROPH/DIAG INJ SC/IM: CPT | Mod: 59 | Performed by: INTERNAL MEDICINE

## 2023-07-27 PROCEDURE — 80053 COMPREHEN METABOLIC PANEL: CPT | Performed by: STUDENT IN AN ORGANIZED HEALTH CARE EDUCATION/TRAINING PROGRAM

## 2023-07-27 PROCEDURE — 84484 ASSAY OF TROPONIN QUANT: CPT | Performed by: STUDENT IN AN ORGANIZED HEALTH CARE EDUCATION/TRAINING PROGRAM

## 2023-07-27 PROCEDURE — 99285 EMERGENCY DEPT VISIT HI MDM: CPT | Mod: 25

## 2023-07-27 PROCEDURE — 99223 PR INITIAL HOSPITAL CARE,LEVL III: ICD-10-PCS | Mod: 25,,, | Performed by: SPECIALIST

## 2023-07-27 PROCEDURE — 83735 ASSAY OF MAGNESIUM: CPT | Performed by: STUDENT IN AN ORGANIZED HEALTH CARE EDUCATION/TRAINING PROGRAM

## 2023-07-27 PROCEDURE — 25000003 PHARM REV CODE 250: Performed by: SPECIALIST

## 2023-07-27 PROCEDURE — 83880 ASSAY OF NATRIURETIC PEPTIDE: CPT | Performed by: STUDENT IN AN ORGANIZED HEALTH CARE EDUCATION/TRAINING PROGRAM

## 2023-07-27 RX ORDER — NITROGLYCERIN 400 UG/1
1 SPRAY ORAL EVERY 5 MIN PRN
Status: DISCONTINUED | OUTPATIENT
Start: 2023-07-27 | End: 2023-07-29 | Stop reason: HOSPADM

## 2023-07-27 RX ORDER — LANOLIN ALCOHOL/MO/W.PET/CERES
1 CREAM (GRAM) TOPICAL 2 TIMES DAILY
COMMUNITY
Start: 2023-02-13

## 2023-07-27 RX ORDER — ISOSORBIDE MONONITRATE 60 MG/1
120 TABLET, EXTENDED RELEASE ORAL DAILY
Status: DISCONTINUED | OUTPATIENT
Start: 2023-07-27 | End: 2023-07-27

## 2023-07-27 RX ORDER — FUROSEMIDE 40 MG/1
40 TABLET ORAL DAILY
Status: DISCONTINUED | OUTPATIENT
Start: 2023-07-27 | End: 2023-07-27

## 2023-07-27 RX ORDER — ALPRAZOLAM 0.25 MG/1
0.25 TABLET ORAL NIGHTLY
Status: DISCONTINUED | OUTPATIENT
Start: 2023-07-27 | End: 2023-07-29 | Stop reason: HOSPADM

## 2023-07-27 RX ORDER — IBUPROFEN 200 MG
16 TABLET ORAL
Status: DISCONTINUED | OUTPATIENT
Start: 2023-07-27 | End: 2023-07-29 | Stop reason: HOSPADM

## 2023-07-27 RX ORDER — NAPROXEN SODIUM 220 MG/1
243 TABLET, FILM COATED ORAL
Status: COMPLETED | OUTPATIENT
Start: 2023-07-27 | End: 2023-07-27

## 2023-07-27 RX ORDER — ASPIRIN 325 MG
325 TABLET ORAL
Status: DISCONTINUED | OUTPATIENT
Start: 2023-07-27 | End: 2023-07-27

## 2023-07-27 RX ORDER — NITROGLYCERIN 0.4 MG/1
0.4 TABLET SUBLINGUAL
Status: COMPLETED | OUTPATIENT
Start: 2023-07-27 | End: 2023-07-27

## 2023-07-27 RX ORDER — DILTIAZEM HYDROCHLORIDE 120 MG/1
1 CAPSULE, COATED, EXTENDED RELEASE ORAL DAILY
Status: ON HOLD | COMMUNITY
End: 2023-07-29 | Stop reason: HOSPADM

## 2023-07-27 RX ORDER — SODIUM CHLORIDE 0.9 % (FLUSH) 0.9 %
10 SYRINGE (ML) INJECTION EVERY 12 HOURS PRN
Status: DISCONTINUED | OUTPATIENT
Start: 2023-07-27 | End: 2023-07-29 | Stop reason: HOSPADM

## 2023-07-27 RX ORDER — CLOPIDOGREL BISULFATE 75 MG/1
75 TABLET ORAL DAILY
Status: DISCONTINUED | OUTPATIENT
Start: 2023-07-27 | End: 2023-07-29 | Stop reason: HOSPADM

## 2023-07-27 RX ORDER — LABETALOL HYDROCHLORIDE 5 MG/ML
10 INJECTION, SOLUTION INTRAVENOUS EVERY 4 HOURS PRN
Status: DISCONTINUED | OUTPATIENT
Start: 2023-07-27 | End: 2023-07-29 | Stop reason: HOSPADM

## 2023-07-27 RX ORDER — ALBUTEROL SULFATE 90 UG/1
2 AEROSOL, METERED RESPIRATORY (INHALATION) EVERY 6 HOURS PRN
Status: DISCONTINUED | OUTPATIENT
Start: 2023-07-27 | End: 2023-07-27

## 2023-07-27 RX ORDER — RANOLAZINE 500 MG/1
500 TABLET, EXTENDED RELEASE ORAL 2 TIMES DAILY
Status: DISCONTINUED | OUTPATIENT
Start: 2023-07-27 | End: 2023-07-29 | Stop reason: HOSPADM

## 2023-07-27 RX ORDER — BUDESONIDE 0.5 MG/2ML
0.5 INHALANT ORAL EVERY 12 HOURS
Status: DISCONTINUED | OUTPATIENT
Start: 2023-07-27 | End: 2023-07-29 | Stop reason: HOSPADM

## 2023-07-27 RX ORDER — ARFORMOTEROL TARTRATE 15 UG/2ML
15 SOLUTION RESPIRATORY (INHALATION) 2 TIMES DAILY
Status: DISCONTINUED | OUTPATIENT
Start: 2023-07-27 | End: 2023-07-29 | Stop reason: HOSPADM

## 2023-07-27 RX ORDER — METOPROLOL TARTRATE 50 MG/1
50 TABLET ORAL 2 TIMES DAILY
Status: DISCONTINUED | OUTPATIENT
Start: 2023-07-27 | End: 2023-07-27

## 2023-07-27 RX ORDER — NALOXONE HCL 0.4 MG/ML
0.02 VIAL (ML) INJECTION
Status: DISCONTINUED | OUTPATIENT
Start: 2023-07-27 | End: 2023-07-29 | Stop reason: HOSPADM

## 2023-07-27 RX ORDER — HEPARIN SODIUM 5000 [USP'U]/ML
5000 INJECTION, SOLUTION INTRAVENOUS; SUBCUTANEOUS EVERY 8 HOURS
Status: DISCONTINUED | OUTPATIENT
Start: 2023-07-27 | End: 2023-07-29 | Stop reason: HOSPADM

## 2023-07-27 RX ORDER — BLACK COHOSH ROOT 200 MG
500 CAPSULE ORAL 2 TIMES DAILY
COMMUNITY
Start: 2023-02-03

## 2023-07-27 RX ORDER — CHOLESTYRAMINE 4 G/5.5G
1 POWDER, FOR SUSPENSION ORAL 2 TIMES DAILY
Status: ON HOLD | COMMUNITY
Start: 2023-02-02 | End: 2023-07-29 | Stop reason: HOSPADM

## 2023-07-27 RX ORDER — GLUCAGON 1 MG
1 KIT INJECTION
Status: DISCONTINUED | OUTPATIENT
Start: 2023-07-27 | End: 2023-07-29 | Stop reason: HOSPADM

## 2023-07-27 RX ORDER — LOPERAMIDE HYDROCHLORIDE 2 MG/1
2 CAPSULE ORAL 4 TIMES DAILY
Status: DISCONTINUED | OUTPATIENT
Start: 2023-07-27 | End: 2023-07-27

## 2023-07-27 RX ORDER — IBUPROFEN 200 MG
24 TABLET ORAL
Status: DISCONTINUED | OUTPATIENT
Start: 2023-07-27 | End: 2023-07-29 | Stop reason: HOSPADM

## 2023-07-27 RX ORDER — FLUTICASONE FUROATE AND VILANTEROL 100; 25 UG/1; UG/1
1 POWDER RESPIRATORY (INHALATION) DAILY
Status: DISCONTINUED | OUTPATIENT
Start: 2023-07-27 | End: 2023-07-27

## 2023-07-27 RX ORDER — MULTIVIT WITH IRON,MINERALS
1 TABLET ORAL DAILY
Status: ON HOLD | COMMUNITY
Start: 2023-02-04 | End: 2023-07-29 | Stop reason: HOSPADM

## 2023-07-27 RX ORDER — ATORVASTATIN CALCIUM 40 MG/1
40 TABLET, FILM COATED ORAL DAILY
Status: DISCONTINUED | OUTPATIENT
Start: 2023-07-27 | End: 2023-07-29 | Stop reason: HOSPADM

## 2023-07-27 RX ORDER — ALBUTEROL SULFATE 0.83 MG/ML
2.5 SOLUTION RESPIRATORY (INHALATION) EVERY 6 HOURS PRN
Status: DISCONTINUED | OUTPATIENT
Start: 2023-07-27 | End: 2023-07-29 | Stop reason: HOSPADM

## 2023-07-27 RX ADMIN — TICAGRELOR 90 MG: 90 TABLET ORAL at 08:07

## 2023-07-27 RX ADMIN — ASPIRIN 81 MG 243 MG: 81 TABLET ORAL at 06:07

## 2023-07-27 RX ADMIN — COLCHICINE 0.3 MG: 0.6 TABLET, FILM COATED ORAL at 08:07

## 2023-07-27 RX ADMIN — CLOPIDOGREL BISULFATE 75 MG: 75 TABLET, FILM COATED ORAL at 03:07

## 2023-07-27 RX ADMIN — ISOSORBIDE MONONITRATE 120 MG: 30 TABLET, EXTENDED RELEASE ORAL at 08:07

## 2023-07-27 RX ADMIN — BUDESONIDE INHALATION 0.5 MG: 0.5 SUSPENSION RESPIRATORY (INHALATION) at 09:07

## 2023-07-27 RX ADMIN — ARFORMOTEROL TARTRATE 15 MCG: 15 SOLUTION RESPIRATORY (INHALATION) at 07:07

## 2023-07-27 RX ADMIN — FUROSEMIDE 40 MG: 40 TABLET ORAL at 08:07

## 2023-07-27 RX ADMIN — SODIUM CHLORIDE 125 MG: 9 INJECTION, SOLUTION INTRAVENOUS at 09:07

## 2023-07-27 RX ADMIN — HEPARIN SODIUM 5000 UNITS: 5000 INJECTION INTRAVENOUS; SUBCUTANEOUS at 09:07

## 2023-07-27 RX ADMIN — LOPERAMIDE HYDROCHLORIDE 2 MG: 2 CAPSULE ORAL at 08:07

## 2023-07-27 RX ADMIN — RANOLAZINE 500 MG: 500 TABLET, EXTENDED RELEASE ORAL at 09:07

## 2023-07-27 RX ADMIN — HEPARIN SODIUM 5000 UNITS: 5000 INJECTION INTRAVENOUS; SUBCUTANEOUS at 06:07

## 2023-07-27 RX ADMIN — RANOLAZINE 500 MG: 500 TABLET, EXTENDED RELEASE ORAL at 08:07

## 2023-07-27 RX ADMIN — METOPROLOL TARTRATE 50 MG: 50 TABLET, FILM COATED ORAL at 06:07

## 2023-07-27 RX ADMIN — ARFORMOTEROL TARTRATE 15 MCG: 15 SOLUTION RESPIRATORY (INHALATION) at 09:07

## 2023-07-27 RX ADMIN — BUDESONIDE INHALATION 0.5 MG: 0.5 SUSPENSION RESPIRATORY (INHALATION) at 07:07

## 2023-07-27 RX ADMIN — ATORVASTATIN CALCIUM 40 MG: 40 TABLET, FILM COATED ORAL at 08:07

## 2023-07-27 RX ADMIN — METOPROLOL TARTRATE 75 MG: 50 TABLET, FILM COATED ORAL at 09:07

## 2023-07-27 RX ADMIN — NITROGLYCERIN 0.4 MG: 0.4 TABLET SUBLINGUAL at 04:07

## 2023-07-27 RX ADMIN — ALLOPURINOL 50 MG: 300 TABLET ORAL at 08:07

## 2023-07-27 RX ADMIN — HEPARIN SODIUM 5000 UNITS: 5000 INJECTION INTRAVENOUS; SUBCUTANEOUS at 02:07

## 2023-07-27 RX ADMIN — NITROGLYCERIN 1 INCH: 20 OINTMENT TOPICAL at 09:07

## 2023-07-27 RX ADMIN — ALPRAZOLAM 0.25 MG: 0.25 TABLET ORAL at 09:07

## 2023-07-27 NOTE — NURSING
Nurses Note -- 4 Eyes      7/27/2023   6:09 PM      Skin assessed during: Admit      [] No Altered Skin Integrity Present    []Prevention Measures Documented      [x] Yes- Altered Skin Integrity Present or Discovered   [] LDA Added if Not in Epic (Describe Wound)   [x] New Altered Skin Integrity was Present on Admit and Documented in LDA   [x] Wound Image Taken    Wound Care Consulted? Yes    Attending Nurse:  Cee Chicas RN     Second RN/Staff Member:  chelo Friedman RN

## 2023-07-27 NOTE — H&P
[facility] - Emergency Dept    History & Physical      Patient Name: Marisol Kerr  MRN: [id number]  Admission Date: 7/27/2023  Attending Physician: Vamshi Young MD   Primary Care Provider: Khdaar Dwyer MD         Patient information was obtained from patient, past medical records, and ER records.     Subjective:     Principal Problem:Chest pain in adult    Chief Complaint:   Chief Complaint   Patient presents with    Shortness of Breath     Intermittent SOB this evening    Chest Pain     Brief CP earlier this evening, resolved        HPI: 77-year-old female history of multivessel CAD with stent on Brilinta, very severe COPD, chronic diastolic CHF, chronic hypoxemic respiratory failure on oxygen, obesity, CKD 3, history of cholangitis status post ERCP with sphincterotomy and bile duct stone removal, type 2 diabetes.      She presents to ER presents to the ED for evaluation of chest pain.  Recently discharged after a brief hospitalization for CHF.    Chest pain started while she was watching family feud about 30 minutes prior to arrival, associated with shortness of breath.  Pain minimally improved with sublingual nitro.  She took 325 mg aspirin.  Then she called 911.  EMS found her to have sats in the mid 90s on 4 L nasal cannula which she normally wears.  They brought her in for further evaluation.  She reports pain has improved somewhat but she is still having some substernal pain.  She denies other symptoms.  She still has off and on shortness of breath.  She had 2 more episodes of chest pain in the ER requiring nitroglycerin.    Her high sensitivity troponin is elevated at 49.7, repeat 52.7.    EKG with ST depressions which were new.  She is chest pain-free at this time.  Also states she cut her finger while cutting garlic.      Past Medical History:   Diagnosis Date    CAD (coronary artery disease)     Cancer     colon    CHF (congestive heart failure)     Colitis     Colon cancer     COPD  (chronic obstructive pulmonary disease)     Decreased hearing     Diabetes mellitus     Diabetes mellitus, type 2     Hypercholesterolemia     Hypertension     Hypoxemia     Insomnia     Obesity     Osteoarthritis        Past Surgical History:   Procedure Laterality Date    ANGIOGRAM, CORONARY, WITH LEFT HEART CATHETERIZATION N/A 2/10/2022    Procedure: Angiogram, Coronary, with Left Heart Cath;  Surgeon: Cristian Benjamin MD;  Location: [facility] CATH/EP LAB;  Service: Cardiology;  Laterality: N/A;    CARDIAC CATHETERIZATION      CHOLECYSTECTOMY      COLECTOMY      CORONARY ANGIOPLASTY      CORONARY STENT PLACEMENT      ERCP N/A 1/16/2023    Procedure: ERCP (ENDOSCOPIC RETROGRADE CHOLANGIOPANCREATOGRAPHY);  Surgeon: Biju Kramer III, MD;  Location: [facility] ENDO;  Service: Endoscopy;  Laterality: N/A;    ESOPHAGOGASTRODUODENOSCOPY N/A 1/29/2023    Procedure: EGD (ESOPHAGOGASTRODUODENOSCOPY);  Surgeon: Aarti Alvarez MD;  Location: [facility] ENDO;  Service: Endoscopy;  Laterality: N/A;    EXTERNAL EAR SURGERY      EYE SURGERY      FLEXIBLE SIGMOIDOSCOPY N/A 9/19/2019    Procedure: SIGMOIDOSCOPY, FLEXIBLE;  Surgeon: Biju Kramer III, MD;  Location: [facility] ENDO;  Service: Endoscopy;  Laterality: N/A;    HYSTERECTOMY      partial       Review of patient's allergies indicates:   Allergen Reactions    Iodinated contrast media     Strawberries [strawberry] Hives and Itching       No current facility-administered medications on file prior to encounter.     Current Outpatient Medications on File Prior to Encounter   Medication Sig    albuterol (VENTOLIN HFA) 90 mcg/actuation inhaler Inhale 2 puffs into the lungs every 6 (six) hours as needed for Wheezing or Shortness of Breath. Rescue    albuterol-ipratropium (DUO-NEB) 2.5 mg-0.5 mg/3 mL nebulizer solution Take 3 mLs by nebulization every 4 (four) hours as needed for Wheezing or Shortness of Breath. Rescue    allopurinoL (ZYLOPRIM) 100 MG tablet Take 0.5 tablets (50 mg  total) by mouth once daily.    ALPRAZolam (XANAX) 0.25 MG tablet Take 1 tablet (0.25 mg total) by mouth every evening.    atorvastatin (LIPITOR) 40 MG tablet Take 1 tablet (40 mg total) by mouth once daily.    coenzyme Q10 100 mg capsule Take 100 mg by mouth every morning.    colchicine (COLCRYS) 0.6 mg tablet Take 0.3 mg by mouth every other day.    diphenoxylate-atropine 2.5-0.025 mg (LOMOTIL) 2.5-0.025 mg per tablet Take 1 tablet by mouth daily as needed.    ergocalciferol (VITAMIN D2) 50,000 unit Cap Take 1 capsule (50,000 Units total) by mouth every 7 days. (Patient taking differently: Take 50,000 Units by mouth every 7 days. SUNDAYS)    ferrous sulfate 325 (65 FE) MG EC tablet Take 325 mg by mouth once daily.    fish oil-omega-3 fatty acids 300-1,000 mg capsule Take 2 capsules by mouth every morning.    fluticasone propionate (FLONASE) 50 mcg/actuation nasal spray 1 spray by Each Nostril route once daily.    fluticasone-salmeterol diskus inhaler 250-50 mcg Inhale 1 puff into the lungs 2 (two) times a day.    fluticasone-umeclidin-vilanter (TRELEGY ELLIPTA) 100-62.5-25 mcg DsDv Inhale 1 puff into the lungs once daily.    furosemide (LASIX) 40 MG tablet Take 1 tablet (40 mg total) by mouth once daily.    ipratropium-albuteroL (COMBIVENT RESPIMAT)  mcg/actuation inhaler Inhale 2 puffs into the lungs every 4 (four) hours as needed for Shortness of Breath. Rescue    iron,carbonyl/ascorbic acid (VITRON-C ORAL) Take 1 tablet by mouth once daily.    isosorbide mononitrate (IMDUR) 120 MG 24 hr tablet Take 1 tablet (120 mg total) by mouth once daily.    ketoconazole (NIZORAL) 2 % cream Apply 1 application  topically 2 (two) times daily.    loperamide (IMODIUM) 2 mg capsule Take 2 mg by mouth 4 (four) times daily.    metoprolol tartrate (LOPRESSOR) 50 MG tablet Take 1 tablet (50 mg total) by mouth 2 (two) times daily.    mupirocin (BACTROBAN) 2 % ointment Apply topically 2 (two) times daily. (Patient taking  differently: Apply 1 g topically 2 (two) times daily.)    nebulizer and compressor Reshma as directed    nitroGLYCERIN 0.4 MG/DOSE TL SPRY (NITROLINGUAL) 400 mcg/spray spray Place 1 spray under the tongue every 5 (five) minutes as needed for Chest pain (max of 3 doses). (Patient not taking: Reported on 7/24/2023)    nystatin (MYCOSTATIN) powder Apply 1 g topically 3 (three) times daily.    pantoprazole (PROTONIX) 40 MG tablet Take 1 tablet (40 mg total) by mouth once daily.    polycarbophil (FIBERCON) 625 mg tablet Take 625 mg by mouth once daily.    potassium chloride (MICRO-K) 10 MEQ CpSR Take 10 mEq by mouth once daily.    ranolazine (RANEXA) 500 MG Tb12 Take 1 tablet (500 mg total) by mouth 2 (two) times daily.    ticagrelor (BRILINTA) 90 mg tablet Take 1 tablet (90 mg total) by mouth every 12 (twelve) hours.    triamcinolone acetonide 0.1% (KENALOG) 0.1 % cream Apply 1 g topically 2 (two) times daily.    umeclidinium (INCRUSE ELLIPTA) 62.5 mcg/actuation inhalation capsule Inhale 62.5 mcg into the lungs every morning. Controller (Patient not taking: Reported on 7/24/2023)    vit C/E/Zn/coppr/lutein/zeaxan (PRESERVISION AREDS-2 ORAL) Take 1 tablet by mouth once daily.    [DISCONTINUED] benazepriL (LOTENSIN) 40 MG tablet Take 1 tablet (40 mg total) by mouth once daily.    [DISCONTINUED] cimetidine (TAGAMET) 300 MG tablet Take 1 tablet (300 mg total) by mouth every 6 (six) hours. Take 1 tablet (300 mg total) by mouth starting at 6 PM on Sunday April 17, 2022 (the evening before your angiogram procedure). Then take 1 tablet at 12 AM, then take 1 tablet at 6 AM on Monday April 18th.    [DISCONTINUED] lisinopriL 10 MG tablet Take 1 tablet (10 mg total) by mouth once daily. HOLD UNTIL OTHERWISE DIRECTED BY PRIMARY CARE PROVIDER    [DISCONTINUED] lovastatin (MEVACOR) 40 MG tablet Take 1 tablet (40 mg total) by mouth every other day.     Family History       Problem Relation (Age of Onset)    Febrile seizures Mother     Heart failure Father          Tobacco Use    Smoking status: Former     Packs/day: 3.00     Years: 50.00     Pack years: 150.00     Types: Cigarettes     Start date: 3/30/1964     Quit date: 2006     Years since quittin.4    Smokeless tobacco: Never    Tobacco comments:     ex smoker 15 years   Substance and Sexual Activity    Alcohol use: Yes    Drug use: No    Sexual activity: Never     Review of Systems  Objective:     Vital Signs (Most Recent):  Temp: 98.4 °F (36.9 °C) (23 0315)  Pulse: 94 (23 0607)  Resp: 20 (23)  BP: (!) 143/63 (23 0602)  SpO2: 98 % (23) Vital Signs (24h Range):  Temp:  [98.4 °F (36.9 °C)] 98.4 °F (36.9 °C)  Pulse:  [] 94  Resp:  [20] 20  SpO2:  [95 %-99 %] 98 %  BP: (131-168)/(61-72) 143/63     Weight: 68 kg (150 lb)  Body mass index is 25.75 kg/m².    Physical Exam  Constitutional:       Appearance: Normal appearance.   HENT:      Head: Normocephalic and atraumatic.      Right Ear: External ear normal.      Left Ear: External ear normal.      Nose: Nose normal.      Mouth/Throat:      Mouth: Mucous membranes are moist.   Eyes:      Extraocular Movements: Extraocular movements intact.      Pupils: Pupils are equal, round, and reactive to light.   Cardiovascular:      Rate and Rhythm: Normal rate and regular rhythm.   Pulmonary:      Effort: Pulmonary effort is normal.   Abdominal:      General: Abdomen is flat.   Musculoskeletal:         General: Normal range of motion.   Skin:     General: Skin is warm.      Capillary Refill: Capillary refill takes less than 2 seconds.      Comments: R middle finger abrasion   Neurological:      General: No focal deficit present.      Mental Status: She is alert and oriented to person, place, and time.         CRANIAL NERVES     CN III, IV, VI   Pupils are equal, round, and reactive to light.    Significant Labs: All pertinent labs within the past 24 hours have been reviewed.    Significant Imaging: I  have reviewed all pertinent imaging results/findings within the past 24 hours.    Assessment/Plan:     Active Diagnoses:    Diagnosis Date Noted POA    PRINCIPAL PROBLEM:  Chest pain in adult [R07.9] 09/17/2019 Unknown      Problems Resolved During this Admission:     VTE Risk Mitigation (From admission, onward)           Ordered     heparin (porcine) injection 5,000 Units  Every 8 hours         07/27/23 0530     IP VTE HIGH RISK PATIENT  Once         07/27/23 0530     Place sequential compression device  Until discontinued         07/27/23 0530                        Chest pain   History of multivessel CAD with stent  Increasing troponin  ST depressions  Uncontrolled hypertension  -She had left heart catheterization 2/2022 severe triple-vessel disease and had complication with rectus sheet hematoma/bleeding and transferred to [facility] for IR intervention and embolization.  She was deemed too high risk for CABG.  She states that she was told she was too high risk for PCI, they are not planning on doing an angiogram for high risk PCI, and she will be medically managed  -Continue Brilinta, Ranexa, p.r.n. nitroglycerin, Lopressor 50 mg b.i.d., Imdur 120 mg q.d., Lipitor 40 mg q.day  -check echocardiogram for wall motion abnormality  -trend CE, EKG  -consult her cardiologist Dr Burt  -p.r.n. labetalol    CKD 3-(baseline 1.4-1.6), 2.0 today  - monitor ins and outs, avoid nephrotoxic agents    R middle finger abrasion from accident from cutting garlic  -wound care    CHF-recently discharged for hospitalization for exacerbation of the same  -declines significant orthopnea  -continue Lasix 40 mg q.d.    COPD  -stable,  -we do not have Trelegy Ellipta to continue the hospital  -p.r.n. DuoNeb     Progressive anemia on DAPT, ASA was discontinued on recent admission  -PPI  -cardiology recommended IV iron  -given multivessel CAD unable to be corrected with CABG, PCI, would use lower threshold to transfuse (<8)  -consider outpatient  GI referral     [id number]      Significant vascular disease within the bilateral lower extremities with stenosis between the common femoral and proximal superficial femoral arteries.     PMHx: lymphedema, hard of hearing     HSQ     Vamshi Young MD  Department of Hospital Medicine   [facility] - Emergency Dept

## 2023-07-27 NOTE — CONSULTS
[facility] - Emergency Dept  Department of Cardiology  Consult Note      PATIENT NAME: Marisol Kerr    MRN: [id number]  TODAY'S DATE: 07/27/2023  ADMIT DATE: 7/27/2023                          CONSULT REQUESTED BY: Vidya Lopez MD    SUBJECTIVE     PRINCIPAL PROBLEM: Chest pain in adult      REASON FOR CONSULT:  Patient continues to have chest pain  She is had documented ST segment depressions with ischemia  I reviewed her cardiac catheterization from 04/22-right coronaries totally occluded in mid distally  She has a large dominant circumflex with possible severe narrowing in the proximal portion as well as diffuse intimal disease  She has 70% lad lesion with diffuse intimal disease the LAD    She apparently went to [facility] in 2022 and they told her she was too high-risk do stenting procedures  She is been treated medically but continues having chest pain and documented ischemia  She is chronic renal insufficiency slightly worse  Her iron levels have been low    From H&P: 77-year-old female history of multivessel CAD with stent on Brilinta, very severe COPD, chronic diastolic CHF, chronic hypoxemic respiratory failure on oxygen, obesity, CKD 3, history of cholangitis status post ERCP with sphincterotomy and bile duct stone removal, type 2 diabetes.      She presents to ER presents to the ED for evaluation of chest pain.  Recently discharged after a brief hospitalization for CHF.     Chest pain started while she was watching family feud about 30 minutes prior to arrival, associated with shortness of breath.  Pain minimally improved with sublingual nitro.  She took 325 mg aspirin.  Then she called 911.  EMS found her to have sats in the mid 90s on 4 L nasal cannula which she normally wears.  They brought her in for further evaluation.  She reports pain has improved somewhat but she is still having some substernal pain.  She denies other symptoms.  She still has off and on shortness of breath.  She had  2 more episodes of chest pain in the ER requiring nitroglycerin.     Her high sensitivity troponin is elevated at 49.7, repeat 52.7.     EKG with ST depressions which were new.  She is chest pain-free at this time.  Also states she cut her finger while cutting garlic.         HPI:    Patient is a 77-year-old female with a history of multivessel disease on Brilinta.  She is a history of severe COPD, CHF, home O2, CKD, obesity, and type 2 diabetes.  She was recently hospitalized with CHF exacerbation and comes in today with complaints of chest pain and shortness of breath.  EKG showed no depressions in the ST segment.  High sensitivity troponin has had minimal elevation since admission.        Review of patient's allergies indicates:   Allergen Reactions    Iodinated contrast media     Strawberries [strawberry] Hives and Itching       Past Medical History:   Diagnosis Date    CAD (coronary artery disease)     Cancer     colon    CHF (congestive heart failure)     Colitis     Colon cancer     COPD (chronic obstructive pulmonary disease)     Decreased hearing     Diabetes mellitus     Diabetes mellitus, type 2     Hypercholesterolemia     Hypertension     Hypoxemia     Insomnia     Obesity     Osteoarthritis      Past Surgical History:   Procedure Laterality Date    ANGIOGRAM, CORONARY, WITH LEFT HEART CATHETERIZATION N/A 2/10/2022    Procedure: Angiogram, Coronary, with Left Heart Cath;  Surgeon: Cristian Benjamin MD;  Location: [facility] CATH/EP LAB;  Service: Cardiology;  Laterality: N/A;    CARDIAC CATHETERIZATION      CHOLECYSTECTOMY      COLECTOMY      CORONARY ANGIOPLASTY      CORONARY STENT PLACEMENT      ERCP N/A 1/16/2023    Procedure: ERCP (ENDOSCOPIC RETROGRADE CHOLANGIOPANCREATOGRAPHY);  Surgeon: Biju Kramer III, MD;  Location: [facility] ENDO;  Service: Endoscopy;  Laterality: N/A;    ESOPHAGOGASTRODUODENOSCOPY N/A 1/29/2023    Procedure: EGD (ESOPHAGOGASTRODUODENOSCOPY);  Surgeon: Aarti Alvarez MD;   Location: [facility];  Service: Endoscopy;  Laterality: N/A;    EXTERNAL EAR SURGERY      EYE SURGERY      FLEXIBLE SIGMOIDOSCOPY N/A 2019    Procedure: SIGMOIDOSCOPY, FLEXIBLE;  Surgeon: Biju Kramer III, MD;  Location: [facility];  Service: Endoscopy;  Laterality: N/A;    HYSTERECTOMY      partial     Social History     Tobacco Use    Smoking status: Former     Packs/day: 3.00     Years: 50.00     Pack years: 150.00     Types: Cigarettes     Start date: 3/30/1964     Quit date: 2006     Years since quittin.4    Smokeless tobacco: Never    Tobacco comments:     ex smoker 15 years   Substance Use Topics    Alcohol use: Yes    Drug use: No        REVIEW OF SYSTEMS  CONSTITUTIONAL: Negative for chills, fatigue and fever.   EYES: No double vision, No blurred vision  NEURO: No headaches, No dizziness  RESPIRATORY: Negative for cough, positive shortness of breath   CARDIOVASCULAR:  Positive for chest pain. Negative for palpitations and leg swelling.   GI: Negative for abdominal pain, No melena, diarrhea, nausea and vomiting.   : Negative for dysuria and frequency, Negative for hematuria  SKIN: Negative for bruising, Negative for edema or discoloration noted.  PSYCHIATRIC: Negative for depression, Negative for anxiety, Negative for memory loss  MUSCULOSKELETAL: Negative for neck pain, Negative for muscle weakness, Negative for back pain     OBJECTIVE     VITAL SIGNS (Most Recent)  Temp: 98.4 °F (36.9 °C) (23 0315)  Pulse: 68 (23 1338)  Resp: 20 (23 1338)  BP: 132/62 (23 1330)  SpO2: 99 % (23 1338)    VENTILATION STATUS  Resp: 20 (23 1338)  SpO2: 99 % (23 1338)           I & O (Last 24H):No intake or output data in the 24 hours ending 23 1349    WEIGHTS  Wt Readings from Last 1 Encounters:   23 68 kg (150 lb)       PHYSICAL EXAM  CONSTITUTIONAL: No fever, no chills  HEENT: Normocephalic, atraumatic,pupils reactive to light                  NECK:  No JVD no carotid bruit  CVS: S1S2+, RRR  LUNGS: Clear  ABDOMEN: Soft, NT, BS+  EXTREMITIES: No cyanosis, edema  : No daniel catheter  NEURO: AAO X 3  PSY: Normal affect      HOME MEDICATIONS:  No current facility-administered medications on file prior to encounter.     Current Outpatient Medications on File Prior to Encounter   Medication Sig Dispense Refill    albuterol (VENTOLIN HFA) 90 mcg/actuation inhaler Inhale 2 puffs into the lungs every 6 (six) hours as needed for Wheezing or Shortness of Breath. Rescue 36 g 3    albuterol-ipratropium (DUO-NEB) 2.5 mg-0.5 mg/3 mL nebulizer solution Take 3 mLs by nebulization every 4 (four) hours as needed for Wheezing or Shortness of Breath. Rescue 120 each 6    allopurinoL (ZYLOPRIM) 100 MG tablet Take 0.5 tablets (50 mg total) by mouth once daily. 45 tablet 1    ALPRAZolam (XANAX) 0.25 MG tablet Take 1 tablet (0.25 mg total) by mouth every evening. 90 tablet 1    atorvastatin (LIPITOR) 40 MG tablet Take 1 tablet (40 mg total) by mouth once daily. 90 tablet 3    coenzyme Q10 100 mg capsule Take 100 mg by mouth every morning.      diltiaZEM (CARDIZEM CD) 120 MG Cp24 Take 1 capsule by mouth once daily.      ergocalciferol (VITAMIN D2) 50,000 unit Cap Take 1 capsule (50,000 Units total) by mouth every 7 days. (Patient taking differently: Take 50,000 Units by mouth every 7 days. SUNDAYS) 12 capsule 1    ferrous sulfate 325 (65 FE) MG EC tablet Take 325 mg by mouth once daily.      fish oil-omega-3 fatty acids 300-1,000 mg capsule Take 2 capsules by mouth every morning.      furosemide (LASIX) 40 MG tablet Take 1 tablet (40 mg total) by mouth once daily. 30 tablet 0    ipratropium-albuteroL (COMBIVENT RESPIMAT)  mcg/actuation inhaler Inhale 2 puffs into the lungs every 4 (four) hours as needed for Shortness of Breath. Rescue 12 g 3    iron,carbonyl/ascorbic acid (VITRON-C ORAL) Take 1 tablet by mouth once daily.      isosorbide mononitrate (IMDUR) 120 MG 24 hr  tablet Take 1 tablet (120 mg total) by mouth once daily. 90 tablet 1    ketoconazole (NIZORAL) 2 % cream Apply 1 application  topically 2 (two) times daily.      magnesium oxide (MAG-OX) 400 mg (241.3 mg magnesium) tablet Take 1 tablet by mouth 2 (two) times daily.      metoprolol tartrate (LOPRESSOR) 50 MG tablet Take 1 tablet (50 mg total) by mouth 2 (two) times daily. 180 tablet 3    mupirocin (BACTROBAN) 2 % ointment Apply topically 2 (two) times daily. (Patient taking differently: Apply 1 g topically 2 (two) times daily.) 30 g 3    nitroGLYCERIN 0.4 MG/DOSE TL SPRY (NITROLINGUAL) 400 mcg/spray spray Place 1 spray under the tongue every 5 (five) minutes as needed for Chest pain (max of 3 doses). 4.9 g 1    pantoprazole (PROTONIX) 40 MG tablet Take 1 tablet (40 mg total) by mouth once daily. 90 tablet 3    polycarbophil (FIBERCON) 625 mg tablet Take 625 mg by mouth once daily.      potassium chloride (MICRO-K) 10 MEQ CpSR Take 10 mEq by mouth once daily.      PREVALITE 4 gram PwPk Take 1 packet by mouth 2 (two) times daily.      SUPER MULTIVITAMIN per tablet Take 1 tablet by mouth Daily.      triamcinolone acetonide 0.1% (KENALOG) 0.1 % cream Apply 1 g topically 2 (two) times daily.      vit C/E/Zn/coppr/lutein/zeaxan (PRESERVISION AREDS-2 ORAL) Take 1 tablet by mouth once daily.      VITAMIN C 500 MG tablet Take 500 mg by mouth 2 (two) times daily.      colchicine (COLCRYS) 0.6 mg tablet Take 0.3 mg by mouth every other day.      fluticasone-salmeterol diskus inhaler 250-50 mcg Inhale 1 puff into the lungs 2 (two) times a day.      loperamide (IMODIUM) 2 mg capsule Take 2 mg by mouth 4 (four) times daily.      ranolazine (RANEXA) 500 MG Tb12 Take 1 tablet (500 mg total) by mouth 2 (two) times daily. 180 tablet 1    ticagrelor (BRILINTA) 90 mg tablet Take 1 tablet (90 mg total) by mouth every 12 (twelve) hours. (Patient not taking: Reported on 7/27/2023) 60 tablet 5    umeclidinium (INCRUSE ELLIPTA) 62.5  mcg/actuation inhalation capsule Inhale 62.5 mcg into the lungs every morning. Controller (Patient not taking: Reported on 7/24/2023) 90 each 3    [DISCONTINUED] benazepriL (LOTENSIN) 40 MG tablet Take 1 tablet (40 mg total) by mouth once daily. 90 tablet 1    [DISCONTINUED] cimetidine (TAGAMET) 300 MG tablet Take 1 tablet (300 mg total) by mouth every 6 (six) hours. Take 1 tablet (300 mg total) by mouth starting at 6 PM on Sunday April 17, 2022 (the evening before your angiogram procedure). Then take 1 tablet at 12 AM, then take 1 tablet at 6 AM on Monday April 18th. 3 tablet 0    [DISCONTINUED] diphenoxylate-atropine 2.5-0.025 mg (LOMOTIL) 2.5-0.025 mg per tablet Take 1 tablet by mouth daily as needed.      [DISCONTINUED] fluticasone propionate (FLONASE) 50 mcg/actuation nasal spray 1 spray by Each Nostril route once daily.      [DISCONTINUED] fluticasone-umeclidin-vilanter (TRELEGY ELLIPTA) 100-62.5-25 mcg DsDv Inhale 1 puff into the lungs once daily.      [DISCONTINUED] lisinopriL 10 MG tablet Take 1 tablet (10 mg total) by mouth once daily. HOLD UNTIL OTHERWISE DIRECTED BY PRIMARY CARE PROVIDER 90 tablet 3    [DISCONTINUED] lovastatin (MEVACOR) 40 MG tablet Take 1 tablet (40 mg total) by mouth every other day. 45 tablet 3    [DISCONTINUED] nebulizer and compressor Reshma as directed 1 each 0    [DISCONTINUED] nystatin (MYCOSTATIN) powder Apply 1 g topically 3 (three) times daily.         SCHEDULED MEDS:   allopurinoL  50 mg Oral Daily    ALPRAZolam  0.25 mg Oral QHS    arformoteroL  15 mcg Nebulization BID    atorvastatin  40 mg Oral Daily    budesonide  0.5 mg Nebulization Q12H    colchicine  0.3 mg Oral Every other day    furosemide  40 mg Oral Daily    heparin (porcine)  5,000 Units Subcutaneous Q8H    isosorbide mononitrate  120 mg Oral Daily    loperamide  2 mg Oral QID    metoprolol tartrate  50 mg Oral BID    ranolazine  500 mg Oral BID    ticagrelor  90 mg Oral Q12H       CONTINUOUS INFUSIONS:    PRN  MEDS:albuterol sulfate, dextrose 50%, dextrose 50%, glucagon (human recombinant), glucose, glucose, labetalol, naloxone, nitroGLYCERIN 0.4 MG/DOSE TL SPRY, sodium chloride 0.9%    LABS AND DIAGNOSTICS     CBC LAST 3 DAYS  Recent Labs   Lab 07/21/23 2058 07/23/23 0333 07/27/23  0308   WBC 7.72 6.87 9.22   RBC 2.96* 2.77* 2.95*   HGB 8.6* 8.0* 8.5*   HCT 28.6* 26.2* 28.2*   MCV 97 95 96   MCH 29.1 28.9 28.8   MCHC 30.1* 30.5* 30.1*   RDW 15.2* 14.9* 15.3*    214 204   MPV 12.1 12.0 11.7   GRAN 74.3*  5.7  --  83.0*  7.7   LYMPH 16.5*  1.3  --  9.4*  0.9*   MONO 6.3  0.5  --  6.0  0.6   BASO 0.05  --  0.05   NRBC 0  --  0       COAGULATION LAST 3 DAYS  Recent Labs   Lab 07/21/23 2058   INR 0.9       CHEMISTRY LAST 3 DAYS  Recent Labs   Lab 07/21/23 2058 07/22/23  0500 07/23/23 0333 07/27/23  0308     --  139 141   K 4.7  --  4.4 4.3     --  100 101   CO2 28  --  34* 30*   ANIONGAP 7*  --  5* 10   BUN 24*  --  30* 37*   CREATININE 1.5*  --  1.7* 2.0*   GLU 95  --  93 144*   CALCIUM 8.8  --  8.3* 8.9   PH  --  7.398  --   --    MG  --   --  1.7 2.1   ALBUMIN 3.0*  --   --  3.2*   PROT 6.3  --   --  6.3   ALKPHOS 201*  --   --  181*   ALT 37  --   --  18   AST 28  --   --  22   BILITOT 0.6  --   --  0.6       CARDIAC PROFILE LAST 3 DAYS  Recent Labs   Lab 07/21/23 2058 07/21/23 2247 07/27/23  0308 07/27/23  0415 07/27/23  0653 07/27/23  0846   *  --  195*  --   --   --    TROPONINIHS 12.3   < > 49.7* 52.7* 57.4* 68.6*    < > = values in this interval not displayed.       ENDOCRINE LAST 3 DAYS  No results for input(s): TSH, PROCAL in the last 168 hours.    LAST ARTERIAL BLOOD GAS  ABG  Recent Labs   Lab 07/22/23  0500   PH 7.398   PO2 51   PCO2 55.9*   HCO3 34.5*   BE 10       LAST 7 DAYS MICROBIOLOGY   Microbiology Results (last 7 days)       ** No results found for the last 168 hours. **            MOST RECENT IMAGING  X-Ray Chest AP Portable  CLINICAL HISTORY:  78 years (1945)  Female Chest Pain Chest pain, SOB    TECHNIQUE:  Portable AP radiograph the chest. One view.    COMPARISON:  Radiograph from July 21, 2023.    FINDINGS:  Mild scattered reticular interstitial lung markings at both lung bases, slightly improved from the previous exam.  No pneumothorax is identified. The heart is top normal in size. Atheromatous calcifications are seen at the aortic arch. Osseous structures show degenerative changes in the spine. The visualized upper abdomen is unremarkable.    IMPRESSION:  Mild nonspecific scattered reticular interstitial markings at both lung bases, slightly improved from the previous exam suggesting examination of atelectasis, scarring, trace edema or mild atypical infection/viral pneumonia in the appropriate clinical setting.    .    Electronically signed by:  García Russell MD  7/27/2023 7:22 AM CDT Workstation: [id number]      ECHOCARDIOGRAM RESULTS (last 5)  Results for orders placed during the hospital encounter of 05/25/23    Echo    Interpretation Summary  · The left ventricle is normal in size with concentric remodeling and normal systolic function.  · The estimated ejection fraction is 65%.  · Indeterminate left ventricular diastolic function.  · Mild to moderate tricuspid regurgitation.  · Mild pulmonic regurgitation.  · Normal right ventricular size with normal right ventricular systolic function.  · Normal central venous pressure (3 mmHg).  · The estimated PA systolic pressure is 32 mmHg.  · Mild mitral regurgitation.      Results for orders placed during the hospital encounter of 01/15/23    Echo    Interpretation Summary  · The left ventricle is normal in size with moderate concentric hypertrophy and normal systolic function.  · Sinus tachycardia heart rate 110  · The estimated ejection fraction is 70%.  · Indeterminate left ventricular diastolic function with normal left atrial pressure.  · Atrial fibrillation not observed.  · Normal right ventricular size with  normal right ventricular systolic function.  · Mild mitral regurgitation.  · Normal central venous pressure (3 mmHg).      Results for orders placed during the hospital encounter of 04/08/22    Echo    Interpretation Summary  · Limited study for wall morgan. several off axis views.  · The estimated ejection fraction is 60%.  · The left ventricle is normal in size with normal systolic function.  · The following segments are hypokinetic: basal inferoseptal and basal inferior. All other segments are normal.  · Normal right ventricular size with normal right ventricular systolic function.      Results for orders placed during the hospital encounter of 02/11/22    Echo    Interpretation Summary  · The left ventricle is normal in size with normal systolic function.  · The estimated ejection fraction is 65%.  · There is a minor septal wall motion abnormality.  · Severe left atrial enlargement.  · Grade II left ventricular diastolic dysfunction.  · Normal right ventricular size with normal right ventricular systolic function.  · Normal central venous pressure (3 mmHg).      Results for orders placed during the hospital encounter of 02/10/22    Echo    Interpretation Summary  · The estimated PA systolic pressure is 37 mmHg.  · The left ventricle is normal in size with normal systolic function.  · The estimated ejection fraction is 60%.  · Grade II left ventricular diastolic dysfunction.  · Normal right ventricular size with normal right ventricular systolic function.  · Severe left atrial enlargement.  · Moderate right atrial enlargement.  · Mild mitral regurgitation.  · Mild tricuspid regurgitation.  · Normal central venous pressure (3 mmHg).      CURRENT/PREVIOUS VISIT EKG  Results for orders placed or performed during the hospital encounter of 07/27/23   EKG 12-lead    Collection Time: 07/27/23  2:47 AM    Narrative    Test Reason : R07.9,    Vent. Rate : 106 BPM     Atrial Rate : 106 BPM     P-R Int : 146 ms          QRS  Dur : 098 ms      QT Int : 364 ms       P-R-T Axes : 070 067 -79 degrees     QTc Int : 483 ms    Sinus tachycardia  Marked ST abnormality, possible inferior subendocardial injury  Abnormal ECG  When compared with ECG of 21-JUL-2023 20:04,  Vent. rate has increased BY  44 BPM  Criteria for Septal infarct are no longer Present  ST now depressed in Inferior leads  Non-specific change in ST segment in Anterior leads  T wave inversion now evident in Inferior leads  Nonspecific T wave abnormality now evident in Lateral leads    Referred By: [email or web address]   SELF           Confirmed By:            ASSESSMENT/PLAN:     Active Hospital Problems    Diagnosis    *Chest pain in adult       ASSESSMENT & PLAN:     Chest pain-patient has severe coronary disease and probable severe narrowing in the circumflex  She needs to be re angiogram-and I will have by Dr. Chappell at [facility] review her film  In the meantime increase beta-blockers and go to topical nitrates.  If she maintains good heart rate and will add calcium channel blocker  She needs her carotids checked as well as Doppler ultrasound of the legs-I can not feel iliac and she may have leriches syn     Abnormal EKG  Cad with hx of stent  Mild ISSA on CKD  CHF with preserved EF  Obesity  COPD  Type II DM  Chronic hypoxemic respiratory failure        RECOMMENDATIONS:    Troponin levels are mildly elevated but nonspecific. Plan for stress test tomorrow.   Continue current medication regimen including Brilinta 90 mg po BID. However will switch to plavix due to possible SOB with Brilinta.   Trend labs. She seems euvolemic on exam. Will continue with oral diuresis from home meds.   Will follow.   Chronic kidney disease-DC Ace and arbs and Lasix for the time being  Her LDL level and total cholesterol very low on Mevacor 40 a day on Mevacor 40 a day  Rx IV iron  I substantially and  personally reviewed old and new ecg's, lab reports,, xray reports  and  other cardiovascular studies  including  echo's, stress tests, angiogram reports, holters,and vascular studies .  In addition I evaluated original cardiac cath  __x_echo normal ventricular function  ____cxr ______ct ____scan on [employer] or [employer] or other viewing platforms and  ___x_EKG's .  EKGs demonstrate some ischemic changes in the past  I reviewed  office and hospital notes Yes __x__ of  referring providers and other providers.  I reviewed personally old hospital and office notes 60  Time spent evaluating and managing this patient:________min.  I reviewed her old angiogram  And echocardiogram      Laura Stevens NP  Date of Service: 07/27/2023  1:49 PM

## 2023-07-27 NOTE — PROGRESS NOTES
Automatic Inhaler to Nebulizer Interchange    fluticasone/vilanterol (Breo Ellipta) 100 mcg/25 mcg changed to budesonide 0.5 mg twice daily AND arformoterol 15 mcg twice daily per [facility] Automatic Therapeutic Substitutions Protocol.    Please contact pharmacy at extension [phone] with any questions.     Thank you,   Victor Manuel Townsend

## 2023-07-27 NOTE — PROGRESS NOTES
Automatic Inhaler to Nebulizer Interchange    albuterol (Ventolin, ProAir, or Proventil)  mcg given multiple times per day changed to albuterol 2.5 mg Q6H PRN Wheezing, Shortness of Breath  per [facility] Automatic Therapeutic Substitutions Protocol.    Please contact pharmacy at extension [phone] with any questions.     Thank you,   Victor Manuel Townsend

## 2023-07-27 NOTE — PHARMACY MED REC
"Admission Medication History     The home medication history was taken by Garett Mccray.    You may go to "Admission" then "Reconcile Home Medications" tabs to review and/or act upon these items.     The home medication list has been updated by the Pharmacy department.   Please read ALL comments highlighted in yellow.   Please address this information as you see fit.    Feel free to contact us if you have any questions or require assistance.      The medications listed below were removed from the home medication list. Please reorder if appropriate:  Patient reports no longer taking the following medication(s):  Flonase   Trelegy Ellipta  Nebulizer and compressor  Nystatin Powder    Medications listed below were obtained from: Patient/family and Analytic software- [employer]  No current facility-administered medications on file prior to encounter.     Current Outpatient Medications on File Prior to Encounter   Medication Sig Dispense Refill    albuterol (VENTOLIN HFA) 90 mcg/actuation inhaler Inhale 2 puffs into the lungs every 6 (six) hours as needed for Wheezing or Shortness of Breath. Rescue 36 g 3    albuterol-ipratropium (DUO-NEB) 2.5 mg-0.5 mg/3 mL nebulizer solution Take 3 mLs by nebulization every 4 (four) hours as needed for Wheezing or Shortness of Breath. Rescue 120 each 6    allopurinoL (ZYLOPRIM) 100 MG tablet Take 0.5 tablets (50 mg total) by mouth once daily. 45 tablet 1    ALPRAZolam (XANAX) 0.25 MG tablet Take 1 tablet (0.25 mg total) by mouth every evening. 90 tablet 1    atorvastatin (LIPITOR) 40 MG tablet Take 1 tablet (40 mg total) by mouth once daily. 90 tablet 3    coenzyme Q10 100 mg capsule Take 100 mg by mouth every morning.      diltiaZEM (CARDIZEM CD) 120 MG Cp24 Take 1 capsule by mouth once daily.      ergocalciferol (VITAMIN D2) 50,000 unit Cap Take 1 capsule (50,000 Units total) by mouth every 7 days. (Patient taking differently: Take 50,000 Units by mouth every 7 days. SUNDAYS) 12 " capsule 1    ferrous sulfate 325 (65 FE) MG EC tablet Take 325 mg by mouth once daily.      fish oil-omega-3 fatty acids 300-1,000 mg capsule Take 2 capsules by mouth every morning.      furosemide (LASIX) 40 MG tablet Take 1 tablet (40 mg total) by mouth once daily. 30 tablet 0    ipratropium-albuteroL (COMBIVENT RESPIMAT)  mcg/actuation inhaler Inhale 2 puffs into the lungs every 4 (four) hours as needed for Shortness of Breath. Rescue 12 g 3    iron,carbonyl/ascorbic acid (VITRON-C ORAL) Take 1 tablet by mouth once daily.      isosorbide mononitrate (IMDUR) 120 MG 24 hr tablet Take 1 tablet (120 mg total) by mouth once daily. 90 tablet 1    ketoconazole (NIZORAL) 2 % cream Apply 1 application  topically 2 (two) times daily.      magnesium oxide (MAG-OX) 400 mg (241.3 mg magnesium) tablet Take 1 tablet by mouth 2 (two) times daily.      metoprolol tartrate (LOPRESSOR) 50 MG tablet Take 1 tablet (50 mg total) by mouth 2 (two) times daily. 180 tablet 3    mupirocin (BACTROBAN) 2 % ointment Apply topically 2 (two) times daily. (Patient taking differently: Apply 1 g topically 2 (two) times daily.) 30 g 3    nitroGLYCERIN 0.4 MG/DOSE TL SPRY (NITROLINGUAL) 400 mcg/spray spray Place 1 spray under the tongue every 5 (five) minutes as needed for Chest pain (max of 3 doses). 4.9 g 1    pantoprazole (PROTONIX) 40 MG tablet Take 1 tablet (40 mg total) by mouth once daily. 90 tablet 3    polycarbophil (FIBERCON) 625 mg tablet Take 625 mg by mouth once daily.      potassium chloride (MICRO-K) 10 MEQ CpSR Take 10 mEq by mouth once daily.      PREVALITE 4 gram PwPk Take 1 packet by mouth 2 (two) times daily.      SUPER MULTIVITAMIN per tablet Take 1 tablet by mouth Daily.      triamcinolone acetonide 0.1% (KENALOG) 0.1 % cream Apply 1 g topically 2 (two) times daily.      vit C/E/Zn/coppr/lutein/zeaxan (PRESERVISION AREDS-2 ORAL) Take 1 tablet by mouth once daily.      VITAMIN C 500 MG tablet Take 500 mg by mouth 2 (two)  times daily.      colchicine (COLCRYS) 0.6 mg tablet Take 0.3 mg by mouth every other day.      fluticasone-salmeterol diskus inhaler 250-50 mcg Inhale 1 puff into the lungs 2 (two) times a day.      loperamide (IMODIUM) 2 mg capsule Take 2 mg by mouth 4 (four) times daily.      ranolazine (RANEXA) 500 MG Tb12 Take 1 tablet (500 mg total) by mouth 2 (two) times daily. 180 tablet 1    ticagrelor (BRILINTA) 90 mg tablet Take 1 tablet (90 mg total) by mouth every 12 (twelve) hours. (Patient not taking: Reported on 7/27/2023) 60 tablet 5    umeclidinium (INCRUSE ELLIPTA) 62.5 mcg/actuation inhalation capsule Inhale 62.5 mcg into the lungs every morning. Controller (Patient not taking: Reported on 7/24/2023) 90 each 3    [DISCONTINUED] benazepriL (LOTENSIN) 40 MG tablet Take 1 tablet (40 mg total) by mouth once daily. 90 tablet 1    [DISCONTINUED] cimetidine (TAGAMET) 300 MG tablet Take 1 tablet (300 mg total) by mouth every 6 (six) hours. Take 1 tablet (300 mg total) by mouth starting at 6 PM on Sunday April 17, 2022 (the evening before your angiogram procedure). Then take 1 tablet at 12 AM, then take 1 tablet at 6 AM on Monday April 18th. 3 tablet 0    [DISCONTINUED] diphenoxylate-atropine 2.5-0.025 mg (LOMOTIL) 2.5-0.025 mg per tablet Take 1 tablet by mouth daily as needed.      [DISCONTINUED] fluticasone propionate (FLONASE) 50 mcg/actuation nasal spray 1 spray by Each Nostril route once daily.      [DISCONTINUED] fluticasone-umeclidin-vilanter (TRELEGY ELLIPTA) 100-62.5-25 mcg DsDv Inhale 1 puff into the lungs once daily.      [DISCONTINUED] lisinopriL 10 MG tablet Take 1 tablet (10 mg total) by mouth once daily. HOLD UNTIL OTHERWISE DIRECTED BY PRIMARY CARE PROVIDER 90 tablet 3    [DISCONTINUED] lovastatin (MEVACOR) 40 MG tablet Take 1 tablet (40 mg total) by mouth every other day. 45 tablet 3    [DISCONTINUED] nebulizer and compressor Reshma as directed 1 each 0    [DISCONTINUED] nystatin (MYCOSTATIN) powder Apply 1  g topically 3 (three) times daily.             Garett Mccray  EXT 1922                 .

## 2023-07-27 NOTE — CARE UPDATE
07/27/23 0724   Patient Assessment/Suction   Level of Consciousness (AVPU) alert   Respiratory Effort Normal;Unlabored   Expansion/Accessory Muscles/Retractions no use of accessory muscles   All Lung Fields Breath Sounds diminished   PRE-TX-O2   Device (Oxygen Therapy) nasal cannula   $ Is the patient on Low Flow Oxygen? Yes   Flow (L/min) 2  (home O2)   SpO2 100 %   Pulse 90   Resp 18   Aerosol Therapy   $ Aerosol Therapy Charges Aerosol Treatment   Daily Review of Necessity (SVN) completed   Respiratory Treatment Status (SVN) given   Treatment Route (SVN) mask;oxygen   Patient Position (SVN) HOB elevated   Post Treatment Assessment (SVN) increased aeration   Signs of Intolerance (SVN) none   Respiratory Evaluation   $ Care Plan Tech Time 15 min

## 2023-07-27 NOTE — ED PROVIDER NOTES
Encounter Date: 7/27/2023       History     Chief Complaint   Patient presents with    Shortness of Breath     Intermittent SOB this evening    Chest Pain     Brief CP earlier this evening, resolved     78-year-old female with history of CAD, CHF, COPD presents for evaluation of substernal chest pain which started while she was watching family feud about 30 minutes prior to arrival, associated with shortness of breath.  Pain minimally improved with sublingual nitro.  She took 325 mg aspirin.  Then she called 911.  EMS found her to have sats in the mid 90s on 4 L nasal cannula which she normally wears.  They brought her in for further evaluation.  She reports pain has improved somewhat but she is still having some substernal pain.  She denies other symptoms.    Review of patient's allergies indicates:   Allergen Reactions    Iodinated contrast media     Strawberries [strawberry] Hives and Itching     Past Medical History:   Diagnosis Date    CAD (coronary artery disease)     Cancer     colon    CHF (congestive heart failure)     Colitis     Colon cancer     COPD (chronic obstructive pulmonary disease)     Decreased hearing     Diabetes mellitus     Diabetes mellitus, type 2     Hypercholesterolemia     Hypertension     Hypoxemia     Insomnia     Obesity     Osteoarthritis      Past Surgical History:   Procedure Laterality Date    ANGIOGRAM, CORONARY, WITH LEFT HEART CATHETERIZATION N/A 2/10/2022    Procedure: Angiogram, Coronary, with Left Heart Cath;  Surgeon: Cristian Benjamin MD;  Location: [facility] CATH/EP LAB;  Service: Cardiology;  Laterality: N/A;    CARDIAC CATHETERIZATION      CHOLECYSTECTOMY      COLECTOMY      CORONARY ANGIOPLASTY      CORONARY STENT PLACEMENT      ERCP N/A 1/16/2023    Procedure: ERCP (ENDOSCOPIC RETROGRADE CHOLANGIOPANCREATOGRAPHY);  Surgeon: Biju Kramer III, MD;  Location: [facility] ENDO;  Service: Endoscopy;  Laterality: N/A;    ESOPHAGOGASTRODUODENOSCOPY N/A 1/29/2023    Procedure:  EGD (ESOPHAGOGASTRODUODENOSCOPY);  Surgeon: Aarti Alvarez MD;  Location: [facility] ENDO;  Service: Endoscopy;  Laterality: N/A;    EXTERNAL EAR SURGERY      EYE SURGERY      FLEXIBLE SIGMOIDOSCOPY N/A 2019    Procedure: SIGMOIDOSCOPY, FLEXIBLE;  Surgeon: Biju Kramer III, MD;  Location: [facility] ENDO;  Service: Endoscopy;  Laterality: N/A;    HYSTERECTOMY      partial     Family History   Problem Relation Age of Onset    Febrile seizures Mother     Heart failure Father      Social History     Tobacco Use    Smoking status: Former     Packs/day: 3.00     Years: 50.00     Pack years: 150.00     Types: Cigarettes     Start date: 3/30/1964     Quit date: 2006     Years since quittin.4    Smokeless tobacco: Never    Tobacco comments:     ex smoker 15 years   Substance Use Topics    Alcohol use: Yes    Drug use: No     Review of Systems   Constitutional:  Negative for activity change, appetite change, chills, fever and unexpected weight change.   HENT:  Negative for dental problem and drooling.    Eyes:  Negative for discharge and itching.   Respiratory:  Negative for cough, chest tightness, shortness of breath, wheezing and stridor.    Cardiovascular:  Positive for chest pain. Negative for palpitations and leg swelling.   Gastrointestinal:  Negative for abdominal distention, abdominal pain, diarrhea and nausea.   Genitourinary:  Negative for difficulty urinating, dysuria, frequency and urgency.   Musculoskeletal:  Negative for back pain, gait problem and joint swelling.   Neurological:  Negative for dizziness, syncope, numbness and headaches.   Psychiatric/Behavioral:  Negative for agitation, behavioral problems and confusion.      Physical Exam     Initial Vitals   BP Pulse Resp Temp SpO2   23 0253 23 0253 23 0307 23 0315 23 0253   131/64 107 20 98.4 °F (36.9 °C) 95 %      MAP       --                Physical Exam    Nursing note and vitals reviewed.  Constitutional: She  appears well-developed and well-nourished. She is not diaphoretic.   HENT:   Head: Normocephalic and atraumatic.   Mouth/Throat: Oropharynx is clear and moist.   Eyes: EOM are normal. Pupils are equal, round, and reactive to light. Right eye exhibits no discharge. Left eye exhibits no discharge.   Neck: No tracheal deviation present.   Normal range of motion.  Cardiovascular:  Normal rate, regular rhythm and intact distal pulses.           Pulmonary/Chest: No respiratory distress. She has no wheezes. She exhibits no tenderness.   Abdominal: Abdomen is soft. She exhibits no distension. There is no abdominal tenderness.   Musculoskeletal:         General: No tenderness or edema. Normal range of motion.      Cervical back: Normal range of motion.     Neurological: She is alert and oriented to person, place, and time. She has normal strength. No cranial nerve deficit or sensory deficit. GCS eye subscore is 4. GCS verbal subscore is 5. GCS motor subscore is 6.   Skin: Skin is warm and dry. No rash noted.   Psychiatric: She has a normal mood and affect. Her behavior is normal. Thought content normal.       ED Course   Procedures  Labs Reviewed   CBC W/ AUTO DIFFERENTIAL - Abnormal; Notable for the following components:       Result Value    RBC 2.95 (*)     Hemoglobin 8.5 (*)     Hematocrit 28.2 (*)     MCHC 30.1 (*)     RDW 15.3 (*)     Lymph # 0.9 (*)     Gran % 83.0 (*)     Lymph % 9.4 (*)     All other components within normal limits   COMPREHENSIVE METABOLIC PANEL - Abnormal; Notable for the following components:    CO2 30 (*)     Glucose 144 (*)     BUN 37 (*)     Creatinine 2.0 (*)     Albumin 3.2 (*)     Alkaline Phosphatase 181 (*)     eGFR 25.4 (*)     All other components within normal limits   TROPONIN I HIGH SENSITIVITY - Abnormal; Notable for the following components:    Troponin I High Sensitivity 49.7 (*)     All other components within normal limits   B-TYPE NATRIURETIC PEPTIDE - Abnormal; Notable for the  following components:     (*)     All other components within normal limits   TROPONIN I HIGH SENSITIVITY - Abnormal; Notable for the following components:    Troponin I High Sensitivity 52.7 (*)     All other components within normal limits    Narrative:      trop critical result(s) repeated. Called and verbal readback   obtained from yarelis buenrostro rn er  by HBS 07/27/2023 04:51   MAGNESIUM        ECG Results              EKG 12-lead (In process)  Result time 07/27/23 05:12:48      In process by Interface, Lab In [facility] (07/27/23 05:12:48)                   Narrative:    Test Reason : R07.9,    Vent. Rate : 106 BPM     Atrial Rate : 106 BPM     P-R Int : 146 ms          QRS Dur : 098 ms      QT Int : 364 ms       P-R-T Axes : 070 067 -79 degrees     QTc Int : 483 ms    Sinus tachycardia  Marked ST abnormality, possible inferior subendocardial injury  Abnormal ECG  When compared with ECG of 21-JUL-2023 20:04,  Vent. rate has increased BY  44 BPM  Criteria for Septal infarct are no longer Present  ST now depressed in Inferior leads  Non-specific change in ST segment in Anterior leads  T wave inversion now evident in Inferior leads  Nonspecific T wave abnormality now evident in Lateral leads    Referred By: [email or web address]   SELF           Confirmed By:                                   Imaging Results              X-Ray Chest AP Portable (In process)                      Medications   sodium chloride 0.9% flush 10 mL (has no administration in time range)   naloxone 0.4 mg/mL injection 0.02 mg (has no administration in time range)   glucose chewable tablet 16 g (has no administration in time range)   glucose chewable tablet 24 g (has no administration in time range)   dextrose 50% injection 12.5 g (has no administration in time range)   dextrose 50% injection 25 g (has no administration in time range)   glucagon (human recombinant) injection 1 mg (has no administration in time range)   heparin (porcine) injection  5,000 Units (5,000 Units Subcutaneous Given 7/27/23 0603)   allopurinol split tablet 50 mg (has no administration in time range)   ALPRAZolam tablet 0.25 mg (has no administration in time range)   atorvastatin tablet 40 mg (has no administration in time range)   colchicine split tablet 0.3 mg (has no administration in time range)   furosemide tablet 40 mg (has no administration in time range)   isosorbide mononitrate 24 hr tablet 120 mg (has no administration in time range)   loperamide capsule 2 mg (has no administration in time range)   metoprolol tartrate (LOPRESSOR) tablet 50 mg (has no administration in time range)   nitroGLYCERIN 0.4 MG/DOSE TL SPRY 400 mcg/spray spray 1 spray (has no administration in time range)   ranolazine 12 hr tablet 500 mg (has no administration in time range)   ticagrelor tablet 90 mg (has no administration in time range)   albuterol nebulizer solution 2.5 mg (has no administration in time range)   budesonide nebulizer solution 0.5 mg (has no administration in time range)   arformoteroL nebulizer solution 15 mcg (has no administration in time range)   nitroGLYCERIN SL tablet 0.4 mg (0.4 mg Sublingual Given 7/27/23 0414)   aspirin chewable tablet 243 mg (243 mg Oral Given 7/27/23 0602)                            78-year-old female presents for evaluation of substernal chest pain and shortness of breath starting prior to arrival.  Patient's pain relieved with sublingual nitro, most concerning for unstable angina since it occurred at rest.  Vitals within normal limits.  EKG with T-wave inversions in inferior leads, no acute ST elevations.  Troponin mildly elevated at 42, repeat at 52.  Raising concern for angina. Patient given 325 mg aspirin admitted to Hospital Medicine for further management.        Clinical Impression:   Final diagnoses:  [R07.9] Chest pain  [R77.8] Elevated troponin (Primary)        ED Disposition Condition    Observation Stable                Stephen Wong  MD  07/27/23 0634

## 2023-07-28 LAB
ANION GAP SERPL CALC-SCNC: 4 MMOL/L (ref 8–16)
BACTERIA #/AREA URNS HPF: NEGATIVE /HPF
BILIRUB UR QL STRIP: NEGATIVE
BUN SERPL-MCNC: 33 MG/DL (ref 8–23)
CALCIUM SERPL-MCNC: 8.6 MG/DL (ref 8.7–10.5)
CHLORIDE SERPL-SCNC: 104 MMOL/L (ref 95–110)
CLARITY UR: CLEAR
CO2 SERPL-SCNC: 34 MMOL/L (ref 23–29)
COLOR UR: YELLOW
CREAT SERPL-MCNC: 2.2 MG/DL (ref 0.5–1.4)
ERYTHROCYTE [DISTWIDTH] IN BLOOD BY AUTOMATED COUNT: 15.4 % (ref 11.5–14.5)
EST. GFR  (NO RACE VARIABLE): 22.7 ML/MIN/1.73 M^2
GLUCOSE SERPL-MCNC: 89 MG/DL (ref 70–110)
GLUCOSE UR QL STRIP: NEGATIVE
HCT VFR BLD AUTO: 27.9 % (ref 37–48.5)
HGB BLD-MCNC: 8.4 G/DL (ref 12–16)
HGB UR QL STRIP: NEGATIVE
HYALINE CASTS #/AREA URNS LPF: 4 /LPF
KETONES UR QL STRIP: NEGATIVE
LEUKOCYTE ESTERASE UR QL STRIP: ABNORMAL
MAGNESIUM SERPL-MCNC: 2.1 MG/DL (ref 1.6–2.6)
MCH RBC QN AUTO: 28.9 PG (ref 27–31)
MCHC RBC AUTO-ENTMCNC: 30.1 G/DL (ref 32–36)
MCV RBC AUTO: 96 FL (ref 82–98)
MICROSCOPIC COMMENT: ABNORMAL
NITRITE UR QL STRIP: NEGATIVE
PH UR STRIP: 8 [PH] (ref 5–8)
PLATELET # BLD AUTO: 195 K/UL (ref 150–450)
PMV BLD AUTO: 12.3 FL (ref 9.2–12.9)
POTASSIUM SERPL-SCNC: 4.2 MMOL/L (ref 3.5–5.1)
PROT UR QL STRIP: ABNORMAL
RBC # BLD AUTO: 2.91 M/UL (ref 4–5.4)
RBC #/AREA URNS HPF: 2 /HPF (ref 0–4)
SODIUM SERPL-SCNC: 142 MMOL/L (ref 136–145)
SP GR UR STRIP: 1.01 (ref 1–1.03)
SQUAMOUS #/AREA URNS HPF: 1 /HPF
TROPONIN I SERPL HS-MCNC: 35.5 PG/ML (ref 0–14.9)
TROPONIN I SERPL HS-MCNC: 36.7 PG/ML (ref 0–14.9)
URATE SERPL-MCNC: 8.6 MG/DL (ref 2.4–5.7)
URN SPEC COLLECT METH UR: ABNORMAL
UROBILINOGEN UR STRIP-ACNC: NEGATIVE EU/DL
WBC # BLD AUTO: 7.5 K/UL (ref 3.9–12.7)
WBC #/AREA URNS HPF: 4 /HPF (ref 0–5)

## 2023-07-28 PROCEDURE — 84484 ASSAY OF TROPONIN QUANT: CPT | Performed by: FAMILY MEDICINE

## 2023-07-28 PROCEDURE — 25000003 PHARM REV CODE 250: Performed by: INTERNAL MEDICINE

## 2023-07-28 PROCEDURE — 94799 UNLISTED PULMONARY SVC/PX: CPT

## 2023-07-28 PROCEDURE — 36415 COLL VENOUS BLD VENIPUNCTURE: CPT | Performed by: SPECIALIST

## 2023-07-28 PROCEDURE — 99214 OFFICE O/P EST MOD 30 MIN: CPT | Mod: ,,, | Performed by: SPECIALIST

## 2023-07-28 PROCEDURE — 27000221 HC OXYGEN, UP TO 24 HOURS

## 2023-07-28 PROCEDURE — 63600175 PHARM REV CODE 636 W HCPCS: Performed by: INTERNAL MEDICINE

## 2023-07-28 PROCEDURE — 25000242 PHARM REV CODE 250 ALT 637 W/ HCPCS: Performed by: INTERNAL MEDICINE

## 2023-07-28 PROCEDURE — 25000003 PHARM REV CODE 250: Performed by: SPECIALIST

## 2023-07-28 PROCEDURE — 99214 PR OFFICE/OUTPT VISIT, EST, LEVL IV, 30-39 MIN: ICD-10-PCS | Mod: ,,, | Performed by: SPECIALIST

## 2023-07-28 PROCEDURE — 36415 COLL VENOUS BLD VENIPUNCTURE: CPT | Performed by: FAMILY MEDICINE

## 2023-07-28 PROCEDURE — 99900035 HC TECH TIME PER 15 MIN (STAT)

## 2023-07-28 PROCEDURE — 83735 ASSAY OF MAGNESIUM: CPT | Performed by: INTERNAL MEDICINE

## 2023-07-28 PROCEDURE — 21400001 HC TELEMETRY ROOM

## 2023-07-28 PROCEDURE — 85027 COMPLETE CBC AUTOMATED: CPT | Performed by: INTERNAL MEDICINE

## 2023-07-28 PROCEDURE — 94761 N-INVAS EAR/PLS OXIMETRY MLT: CPT

## 2023-07-28 PROCEDURE — 94640 AIRWAY INHALATION TREATMENT: CPT

## 2023-07-28 PROCEDURE — 80048 BASIC METABOLIC PNL TOTAL CA: CPT | Performed by: INTERNAL MEDICINE

## 2023-07-28 PROCEDURE — 96372 THER/PROPH/DIAG INJ SC/IM: CPT | Performed by: INTERNAL MEDICINE

## 2023-07-28 PROCEDURE — 25000003 PHARM REV CODE 250: Performed by: NURSE PRACTITIONER

## 2023-07-28 PROCEDURE — 84550 ASSAY OF BLOOD/URIC ACID: CPT | Performed by: SPECIALIST

## 2023-07-28 PROCEDURE — 81001 URINALYSIS AUTO W/SCOPE: CPT | Performed by: FAMILY MEDICINE

## 2023-07-28 RX ORDER — CALCIUM CARBONATE 200(500)MG
500 TABLET,CHEWABLE ORAL 3 TIMES DAILY PRN
Status: DISCONTINUED | OUTPATIENT
Start: 2023-07-28 | End: 2023-07-29 | Stop reason: HOSPADM

## 2023-07-28 RX ORDER — FUROSEMIDE 20 MG/1
20 TABLET ORAL EVERY OTHER DAY
Status: DISCONTINUED | OUTPATIENT
Start: 2023-07-29 | End: 2023-07-29

## 2023-07-28 RX ADMIN — ALLOPURINOL 50 MG: 300 TABLET ORAL at 08:07

## 2023-07-28 RX ADMIN — NITROGLYCERIN 1 INCH: 20 OINTMENT TOPICAL at 05:07

## 2023-07-28 RX ADMIN — ARFORMOTEROL TARTRATE 15 MCG: 15 SOLUTION RESPIRATORY (INHALATION) at 08:07

## 2023-07-28 RX ADMIN — HEPARIN SODIUM 5000 UNITS: 5000 INJECTION INTRAVENOUS; SUBCUTANEOUS at 09:07

## 2023-07-28 RX ADMIN — METOPROLOL TARTRATE 75 MG: 50 TABLET, FILM COATED ORAL at 08:07

## 2023-07-28 RX ADMIN — RANOLAZINE 500 MG: 500 TABLET, EXTENDED RELEASE ORAL at 08:07

## 2023-07-28 RX ADMIN — ATORVASTATIN CALCIUM 40 MG: 40 TABLET, FILM COATED ORAL at 08:07

## 2023-07-28 RX ADMIN — METOPROLOL TARTRATE 75 MG: 50 TABLET, FILM COATED ORAL at 09:07

## 2023-07-28 RX ADMIN — ARFORMOTEROL TARTRATE 15 MCG: 15 SOLUTION RESPIRATORY (INHALATION) at 07:07

## 2023-07-28 RX ADMIN — NITROGLYCERIN 1 INCH: 20 OINTMENT TOPICAL at 12:07

## 2023-07-28 RX ADMIN — HEPARIN SODIUM 5000 UNITS: 5000 INJECTION INTRAVENOUS; SUBCUTANEOUS at 03:07

## 2023-07-28 RX ADMIN — CLOPIDOGREL BISULFATE 75 MG: 75 TABLET, FILM COATED ORAL at 08:07

## 2023-07-28 RX ADMIN — HEPARIN SODIUM 5000 UNITS: 5000 INJECTION INTRAVENOUS; SUBCUTANEOUS at 05:07

## 2023-07-28 RX ADMIN — BUDESONIDE INHALATION 0.5 MG: 0.5 SUSPENSION RESPIRATORY (INHALATION) at 07:07

## 2023-07-28 RX ADMIN — METOPROLOL TARTRATE 75 MG: 50 TABLET, FILM COATED ORAL at 03:07

## 2023-07-28 RX ADMIN — RANOLAZINE 500 MG: 500 TABLET, EXTENDED RELEASE ORAL at 09:07

## 2023-07-28 RX ADMIN — ALPRAZOLAM 0.25 MG: 0.25 TABLET ORAL at 09:07

## 2023-07-28 NOTE — CARE UPDATE
07/28/23 0720   Patient Assessment/Suction   Level of Consciousness (AVPU) alert   Respiratory Effort Normal;Unlabored   Expansion/Accessory Muscles/Retractions no use of accessory muscles;no retractions;expansion symmetric   All Lung Fields Breath Sounds clear;diminished   Rhythm/Pattern, Respiratory unlabored;pattern regular;depth regular;chest wiggle adequate;no shortness of breath reported   Cough Frequency no cough   PRE-TX-O2   Device (Oxygen Therapy) nasal cannula   $ Is the patient on Low Flow Oxygen? Yes   Flow (L/min) 2   SpO2 (!) 94 %   Pulse Oximetry Type Intermittent   $ Pulse Oximetry - Multiple Charge Pulse Oximetry - Multiple   Pulse 61   Resp 16   Aerosol Therapy   $ Aerosol Therapy Charges Aerosol Treatment   Daily Review of Necessity (SVN) completed   Respiratory Treatment Status (SVN) given   Treatment Route (SVN) oxygen;mask   Patient Position (SVN) HOB elevated   Post Treatment Assessment (SVN) increased aeration   Signs of Intolerance (SVN) none   Breath Sounds Post-Respiratory Treatment   Throughout All Fields Post-Treatment All Fields   Throughout All Fields Post-Treatment aeration increased   Post-treatment Heart Rate (beats/min) 62   Post-treatment Resp Rate (breaths/min) 17   Respiratory Evaluation   $ Care Plan Tech Time 15 min

## 2023-07-28 NOTE — PLAN OF CARE
[facility]  Initial Discharge Assessment       Primary Care Provider: Khadar Dwyer MD    Admission Diagnosis: Elevated troponin [R77.8]    Admission Date: 7/27/2023  Expected Discharge Date:    CM attempted assessment with pt and she stated that her hearing aide battery is dead and she can not hear.     CM contacted pts daughter Marisol at [phone] to complete assessment . She confirmed that pt lives alone but she visits her weekly and pt has a  that comes biweekly as well as home health services from [facility]. Pt has a home RW, WC , SC and lift chair. She confirmed pts insurance and pcp. Updated on BURROWS observation status. PTS discharge plan is home with HH .     Transition of Care Barriers: None    Payor: MEDICARE / Plan: MEDICARE PART A & B / Product Type: Government /     Extended Emergency Contact Information  Primary Emergency Contact: Marisol Kerr ALESIA  Address: [address] [place] [address] [place]  Mobile Phone: [phone]  Relation: Daughter  Preferred language: English   needed? No    Discharge Plan A: Home, Home Health  Discharge Plan B: Home with family, Home Health      [facility] - [place], [place] - [address] [facility]  [address] [address]  Phone: [phone] Fax: [phone]    [facility] - [place], [place] - [address]  [address] [address]  Phone: [phone] Fax: [phone]      Initial Assessment (most recent)       Adult Discharge Assessment - 07/28/23 0951          Discharge Assessment    Assessment Type Discharge Planning Assessment     Confirmed/corrected address, phone number and insurance Yes     Confirmed Demographics Correct on Facesheet     Source of Information family;patient     Does patient/caregiver understand observation status Yes     Communicated LUZ with patient/caregiver Yes     Reason For Admission Elevated troponin     People  in Home alone     Facility Arrived From: Home     Do you expect to return to your current living situation? Yes     Do you have help at home or someone to help you manage your care at home? Yes     Who are your caregiver(s) and their phone number(s)? Daughter Marisol [phone]     Prior to hospitilization cognitive status: Alert/Oriented     Current cognitive status: Alert/Oriented     Home Layout Able to live on 1st floor     Equipment Currently Used at Home none     Readmission within 30 days? Yes     Patient currently being followed by outpatient case management? No     Do you currently have service(s) that help you manage your care at home? Yes     Name and Contact number of agency [facility]     Is the pt/caregiver preference to resume services with current agency Yes     Do you take prescription medications? Yes     Do you have prescription coverage? Yes     Do you have any problems affording any of your prescribed medications? No     Is the patient taking medications as prescribed? yes     Who is going to help you get home at discharge? Daughter Marisol [phone]     How do you get to doctors appointments? family or friend will provide     Are you on dialysis? No     Do you take coumadin? No     Discharge Plan A Home;Home Health     Discharge Plan B Home with family;Home Health     DME Needed Upon Discharge  none     Discharge Plan discussed with: Adult children;Patient     Transition of Care Barriers None        Physical Activity    On average, how many days per week do you engage in moderate to strenuous exercise (like a brisk walk)? Patient refused     On average, how many minutes do you engage in exercise at this level? Patient refused        Financial Resource Strain    How hard is it for you to pay for the very basics like food, housing, medical care, and heating? Patient refused        Housing Stability    In the last 12 months, was there a time when you were not able to pay the mortgage or rent on  time? No     In the last 12 months, was there a time when you did not have a steady place to sleep or slept in a shelter (including now)? No        Transportation Needs    In the past 12 months, has lack of transportation kept you from medical appointments or from getting medications? No     In the past 12 months, has lack of transportation kept you from meetings, work, or from getting things needed for daily living? No        Food Insecurity    Within the past 12 months, you worried that your food would run out before you got the money to buy more. Never true     Within the past 12 months, the food you bought just didn't last and you didn't have money to get more. Never true        Stress    Do you feel stress - tense, restless, nervous, or anxious, or unable to sleep at night because your mind is troubled all the time - these days? Patient refused        Social Connections    In a typical week, how many times do you talk on the phone with family, friends, or neighbors? Patient refused     How often do you get together with friends or relatives? Patient refused     How often do you attend Uatsdin or Anabaptist services? Patient refused     Do you belong to any clubs or organizations such as [employer]ats[employer] groups, unions, fraternal or athletic groups, or school groups? Patient refused     How often do you attend meetings of the clubs or organizations you belong to? Patient refused     Are you , , , , never , or living with a partner?         Alcohol Use    Q1: How often do you have a drink containing alcohol? Patient refused     Q2: How many drinks containing alcohol do you have on a typical day when you are drinking? Patient refused     Q3: How often do you have six or more drinks on one occasion? Patient refused

## 2023-07-28 NOTE — PLAN OF CARE
CM sent HH packet to [facility] to update them of pts anticipated discharge home today vs tomorrow. CM to send HH resumption orders once available. AVS updated. CM following.    07/28/23 1344   Post-Acute Status   Post-Acute Authorization Home Health   Home Health Status Referrals Sent   Patient choice form signed by patient/caregiver List with quality metrics by geographic area provided

## 2023-07-28 NOTE — CONSULTS
[facility]  Cardiology  Progress Note    Patient Name: Marisol Kerr  MRN: [id number]  Admission Date: 7/27/2023  Hospital Length of Stay: 0 days  Code Status: Full Code   Attending Physician: Vidya Lopez MD   Primary Care Physician: Khadar Dwyer MD  Expected Discharge Date: 7/29/2023  Principal Problem:Chest pain in adult    Subjective:     Hospital Course:  Had a little bit of chest pain today when she stood up    Interval History:  I would a long talk with her daughter-her daughter states that the patient lives by herself and does not complain about lot about chest pain  Patient states that when she goes out to the laundry in the hot garage she will get some chest pain  Daughter states that she would seen Dr. Ta ramesh at [facility] 2 years ago and they felt she was too ill and high risk to do an angiogram stenting stenting at that time  Patient has not been on good heart rate control to reduce cardiac workload  She is not had recent stress test for evaluation of ischemia  I reviewed her angiograms from 2022 and they shows severe disease in the LAD and the circ system    ROS  Objective:     Vital Signs (Most Recent):  Temp: 98.6 °F (37 °C) (07/28/23 1044)  Pulse: (!) 55 (07/28/23 1044)  Resp: 16 (07/28/23 1044)  BP: 133/60 (07/28/23 1044)  SpO2: 97 % (07/28/23 1241) Vital Signs (24h Range):  Temp:  [97.8 °F (36.6 °C)-98.6 °F (37 °C)] 98.6 °F (37 °C)  Pulse:  [55-76] 55  Resp:  [16-23] 16  SpO2:  [86 %-100 %] 97 %  BP: (124-153)/(54-70) 133/60     Weight: 66.5 kg (146 lb 11.2 oz)  Body mass index is 25.99 kg/m².    SpO2: 97 %         Intake/Output Summary (Last 24 hours) at 7/28/2023 1432  Last data filed at 7/28/2023 1319  Gross per 24 hour   Intake 390 ml   Output 700 ml   Net -310 ml       Lines/Drains/Airways       Peripheral Intravenous Line  Duration                  Peripheral IV - Single Lumen 07/27/23 0316 20 G;1 3/4 in Anterior;Left Forearm 1 day                    Physical  Exam today blood pressure 110/80 standing  Lungs slightly diminished breath  Cardiac regular  Abdomen obese  Mild edema    Significant Labs: CMP   Recent Labs   Lab 07/27/23  0308 07/28/23  0327    142   K 4.3 4.2    104   CO2 30* 34*   * 89   BUN 37* 33*   CREATININE 2.0* 2.2*   CALCIUM 8.9 8.6*   PROT 6.3  --    ALBUMIN 3.2*  --    BILITOT 0.6  --    ALKPHOS 181*  --    AST 22  --    ALT 18  --    ANIONGAP 10 4*    and CBC   Recent Labs   Lab 07/27/23  0308 07/28/23  0327   WBC 9.22 7.50   HGB 8.5* 8.4*   HCT 28.2* 27.9*    195       Significant Imaging:   Assessment and Plan:   Problem 1 patient has chronic kidney disease significant coronary disease.  Last admit was prompted by a little bit of heart failure  This admit prompted by chest pain-she has documented ischemia on EKGs  Patient lives alone; her daughter states she does not have a lot of angina; she gets angina when she is in the heat  Changes who made on this admit are 1 giving IV iron, increasing metoprolol, adding topical nitrates, adding diuretics every other day.  I have set her up tentatively for stress test tomorrow just to assess ischemic load  If she is stable she probably can go home using nitroglycerin spray as needed  [facility] Dr. Patel would reconsider we looking at her if she continues having bad angina  When she goes home I would probably add low-dose Cardizem  Brief HPI:     Active Diagnoses:    Diagnosis Date Noted POA    PRINCIPAL PROBLEM:  Chest pain in adult [R07.9] 09/17/2019 Unknown      Problems Resolved During this Admission:       VTE Risk Mitigation (From admission, onward)           Ordered     heparin (porcine) injection 5,000 Units  Every 8 hours         07/27/23 0530     IP VTE HIGH RISK PATIENT  Once         07/27/23 0530     Place sequential compression device  Until discontinued         07/27/23 0530                    Khadar Burt MD  Cardiology  [facility]

## 2023-07-28 NOTE — NURSING
1919 Patient in bed, awake. Bed alarm on. IV site clean dry and intact. Call light within reach. Bed in the lowest position. Patient denies any needs.    0045 Patient in bed, asleep. Chest rising and falling. IV infusion complete. Call light within reach. Bed in the lowest position. No needs at this time.     0530 Patient remains sleep, in bed. Chest rising and falling. Call light within reach. Bed in the lowest position. No needs at this time.

## 2023-07-28 NOTE — PLAN OF CARE
CM called [phone] to schedule PCP follow up with Dr. Dwyer however office is closed and Case managment is unable to schedule.CM added directions for pt to   Please call to schedule a one week hospital follow up to AVS.    07/28/23 1346   Discharge Assessment   Assessment Type Discharge Planning Reassessment

## 2023-07-28 NOTE — PLAN OF CARE
8/1/23 11:40 Hospital follow up added to pts AVS   07/28/23 2517   Discharge Assessment   Assessment Type Discharge Planning Reassessment

## 2023-07-28 NOTE — PROGRESS NOTES
"[facility] Medicine  Progress Note    Patient Name: Marisol Kerr  MRN: [id number]  Admission Date: 7/27/2023  2:40 AM  Attending Physician: Vidya Lopez MD  Face-to-Face encounter date: 07/28/2023    Assessment & Plan:   Marisol Kerr is a 78 y.o. female with:    Active Hospital Problems    Diagnosis  POA    *Chest pain in adult [R07.9]  Unknown      Resolved Hospital Problems   No resolved problems to display.     Chest pain  Severe CAD, HFpEF   Chronic respiratory failure with COPD  ISSA on CKD  - serial EKG  - telemetry  - plavix, statin, BB, ranolazine, nitroglycerin   - UA + micro, trending BMP  - cardiology following, thank you    DM2  - glucose appears controlled without SSI  - trending BMP    Other chronic conditions including but not limited to those noted above/below:  All home meds reviewed personally by me, and adjusted as appropriate.  Electrolyte derangement:  Trending BMP, Mg; Replacement prn  DVT ppx:  heparin  Dispo:  Home when medically stable.  Will need follow-up with their PCP.    FULL CODE    - The above conditions include an acute and/or chronic illness that poses a threat to life (or bodily function).  - Previous notes/encounters/external records reviewed personally by me.  - Pt's case personally discussed with the : discharge planning    Subjective:      Interval History:  pt is very hard of hearing and can't answer my questions.  The batteries for her hearing aids are on route.  She appears comfortable.      Review of Systems   unable to obtain: deaf    Objective:     Physical Exam  /60 (BP Location: Right arm, Patient Position: Lying)   Pulse (!) 55   Temp 98.6 °F (37 °C) (Oral)   Resp 16   Ht 5' 3" (1.6 m)   Wt 66.5 kg (146 lb 11.2 oz)   SpO2 97%   BMI 25.99 kg/m²     Gen: alert, responsive   HEENT:  Eyes - no pallor  External ears with no lesions  Nares patent  Mouth, Throat:  trachea midline  CV: RRR  Lungs: CTA B/L  Abd: +BS, soft, NT, " "ND  Ext: no atrophy or edema  Skin: warm, dry  Neuro: grossly intact  Psych: pleasant    Recent Labs   Lab 07/28/23 0327   WBC 7.50   HGB 8.4*   HCT 27.9*        Recent Labs   Lab 07/28/23 0327   CALCIUM 8.6*      K 4.2   CO2 34*      BUN 33*   CREATININE 2.2*     No results found for: POCTGLUCOSE   Microbiology Results (last 7 days)       ** No results found for the last 168 hours. **          Imaging Results              X-Ray Chest AP Portable (Final result)  Result time 07/27/23 07:22:58      Final result by García Russell MD (07/27/23 07:22:58)                   Narrative:    CLINICAL HISTORY:  78 years (1945) Female Chest Pain Chest pain, SOB    TECHNIQUE:  Portable AP radiograph the chest. One view.    COMPARISON:  Radiograph from July 21, 2023.    FINDINGS:  Mild scattered reticular interstitial lung markings at both lung bases, slightly improved from the previous exam.  No pneumothorax is identified. The heart is top normal in size. Atheromatous calcifications are seen at the aortic arch. Osseous structures show degenerative changes in the spine. The visualized upper abdomen is unremarkable.    IMPRESSION:  Mild nonspecific scattered reticular interstitial markings at both lung bases, slightly improved from the previous exam suggesting examination of atelectasis, scarring, trace edema or mild atypical infection/viral pneumonia in the appropriate clinical setting.                  .            Electronically signed by:  García Russell MD  7/27/2023 7:22 AM CDT Workstation: [id number]                                     Labs and images reviewed personally by me.  See my personal assessment/interpretation above under "Assessment & Plan."    Vidya Lopez MD  [facility]      "

## 2023-07-28 NOTE — PLAN OF CARE
07/28/23 0958   BURROWS Message   Medicare Outpatient and Observation Notification regarding financial responsibility Given to patient/caregiver;Explained to patient/caregiver;Signed/date by patient/caregiver   Date BURROWS was signed 07/28/23   Time BURROWS was signed 0958

## 2023-07-29 ENCOUNTER — CLINICAL SUPPORT (OUTPATIENT)
Dept: CARDIOLOGY | Facility: HOSPITAL | Age: 78
DRG: 303 | End: 2023-07-29
Attending: SPECIALIST
Payer: MEDICARE

## 2023-07-29 VITALS
OXYGEN SATURATION: 99 % | WEIGHT: 146.69 LBS | DIASTOLIC BLOOD PRESSURE: 61 MMHG | BODY MASS INDEX: 25.99 KG/M2 | RESPIRATION RATE: 18 BRPM | TEMPERATURE: 98 F | HEART RATE: 51 BPM | SYSTOLIC BLOOD PRESSURE: 145 MMHG | HEIGHT: 63 IN

## 2023-07-29 PROBLEM — R79.89 ELEVATED TROPONIN: Status: ACTIVE | Noted: 2023-07-29

## 2023-07-29 PROBLEM — R07.9 CHEST PAIN IN ADULT: Status: RESOLVED | Noted: 2019-09-17 | Resolved: 2023-07-29

## 2023-07-29 LAB
ANION GAP SERPL CALC-SCNC: 6 MMOL/L (ref 8–16)
BUN SERPL-MCNC: 35 MG/DL (ref 8–23)
CALCIUM SERPL-MCNC: 8.7 MG/DL (ref 8.7–10.5)
CHLORIDE SERPL-SCNC: 100 MMOL/L (ref 95–110)
CO2 SERPL-SCNC: 32 MMOL/L (ref 23–29)
CREAT SERPL-MCNC: 2 MG/DL (ref 0.5–1.4)
CV PHARM DOSE: 0.4 MG
CV STRESS BASE HR: 51 BPM
DIASTOLIC BLOOD PRESSURE: 54 MMHG
ERYTHROCYTE [DISTWIDTH] IN BLOOD BY AUTOMATED COUNT: 15.1 % (ref 11.5–14.5)
EST. GFR  (NO RACE VARIABLE): 25.4 ML/MIN/1.73 M^2
GLUCOSE SERPL-MCNC: 85 MG/DL (ref 70–110)
HCT VFR BLD AUTO: 32.7 % (ref 37–48.5)
HGB BLD-MCNC: 9.8 G/DL (ref 12–16)
MAGNESIUM SERPL-MCNC: 2.1 MG/DL (ref 1.6–2.6)
MCH RBC QN AUTO: 28.5 PG (ref 27–31)
MCHC RBC AUTO-ENTMCNC: 30 G/DL (ref 32–36)
MCV RBC AUTO: 95 FL (ref 82–98)
OHS CV CPX 1 MINUTE RECOVERY HEART RATE: 62 BPM
OHS CV CPX 85 PERCENT MAX PREDICTED HEART RATE MALE: 118
OHS CV CPX MAX PREDICTED HEART RATE: 139
OHS CV CPX PATIENT IS FEMALE: 1
OHS CV CPX PATIENT IS MALE: 0
OHS CV CPX PEAK DIASTOLIC BLOOD PRESSURE: 58 MMHG
OHS CV CPX PEAK HEAR RATE: 68 BPM
OHS CV CPX PEAK RATE PRESSURE PRODUCT: NORMAL
OHS CV CPX PEAK SYSTOLIC BLOOD PRESSURE: 153 MMHG
OHS CV CPX PERCENT MAX PREDICTED HEART RATE ACHIEVED: 49
OHS CV CPX RATE PRESSURE PRODUCT PRESENTING: 7446
PLATELET # BLD AUTO: 204 K/UL (ref 150–450)
PMV BLD AUTO: 11.9 FL (ref 9.2–12.9)
POTASSIUM SERPL-SCNC: 4 MMOL/L (ref 3.5–5.1)
RBC # BLD AUTO: 3.44 M/UL (ref 4–5.4)
SODIUM SERPL-SCNC: 138 MMOL/L (ref 136–145)
SYSTOLIC BLOOD PRESSURE: 146 MMHG
TROPONIN I SERPL HS-MCNC: 25.4 PG/ML (ref 0–14.9)
TROPONIN I SERPL HS-MCNC: 27.2 PG/ML (ref 0–14.9)
TROPONIN I SERPL HS-MCNC: 35.6 PG/ML (ref 0–14.9)
WBC # BLD AUTO: 6.71 K/UL (ref 3.9–12.7)

## 2023-07-29 PROCEDURE — 25000242 PHARM REV CODE 250 ALT 637 W/ HCPCS: Performed by: INTERNAL MEDICINE

## 2023-07-29 PROCEDURE — 94761 N-INVAS EAR/PLS OXIMETRY MLT: CPT

## 2023-07-29 PROCEDURE — 93016 NUCLEAR STRESS TEST (CUPID ONLY): ICD-10-PCS | Mod: ,,, | Performed by: INTERNAL MEDICINE

## 2023-07-29 PROCEDURE — 85027 COMPLETE CBC AUTOMATED: CPT | Performed by: INTERNAL MEDICINE

## 2023-07-29 PROCEDURE — 63600175 PHARM REV CODE 636 W HCPCS: Performed by: INTERNAL MEDICINE

## 2023-07-29 PROCEDURE — 93017 CV STRESS TEST TRACING ONLY: CPT

## 2023-07-29 PROCEDURE — 36415 COLL VENOUS BLD VENIPUNCTURE: CPT | Performed by: FAMILY MEDICINE

## 2023-07-29 PROCEDURE — 27000221 HC OXYGEN, UP TO 24 HOURS

## 2023-07-29 PROCEDURE — 63600175 PHARM REV CODE 636 W HCPCS: Performed by: SPECIALIST

## 2023-07-29 PROCEDURE — 25000003 PHARM REV CODE 250: Performed by: NURSE PRACTITIONER

## 2023-07-29 PROCEDURE — 99900035 HC TECH TIME PER 15 MIN (STAT)

## 2023-07-29 PROCEDURE — 93016 CV STRESS TEST SUPVJ ONLY: CPT | Mod: ,,, | Performed by: INTERNAL MEDICINE

## 2023-07-29 PROCEDURE — 25000003 PHARM REV CODE 250: Performed by: SPECIALIST

## 2023-07-29 PROCEDURE — 99233 PR SUBSEQUENT HOSPITAL CARE,LEVL III: ICD-10-PCS | Mod: 25,,, | Performed by: INTERNAL MEDICINE

## 2023-07-29 PROCEDURE — 84484 ASSAY OF TROPONIN QUANT: CPT | Performed by: FAMILY MEDICINE

## 2023-07-29 PROCEDURE — 83735 ASSAY OF MAGNESIUM: CPT | Performed by: INTERNAL MEDICINE

## 2023-07-29 PROCEDURE — 84484 ASSAY OF TROPONIN QUANT: CPT | Mod: 91 | Performed by: FAMILY MEDICINE

## 2023-07-29 PROCEDURE — 93018 NUCLEAR STRESS TEST (CUPID ONLY): ICD-10-PCS | Mod: ,,, | Performed by: INTERNAL MEDICINE

## 2023-07-29 PROCEDURE — 99900031 HC PATIENT EDUCATION (STAT)

## 2023-07-29 PROCEDURE — 80048 BASIC METABOLIC PNL TOTAL CA: CPT | Performed by: INTERNAL MEDICINE

## 2023-07-29 PROCEDURE — 25000003 PHARM REV CODE 250: Performed by: INTERNAL MEDICINE

## 2023-07-29 PROCEDURE — 94640 AIRWAY INHALATION TREATMENT: CPT

## 2023-07-29 PROCEDURE — 93018 CV STRESS TEST I&R ONLY: CPT | Mod: ,,, | Performed by: INTERNAL MEDICINE

## 2023-07-29 PROCEDURE — 99233 SBSQ HOSP IP/OBS HIGH 50: CPT | Mod: 25,,, | Performed by: INTERNAL MEDICINE

## 2023-07-29 PROCEDURE — 25000003 PHARM REV CODE 250: Performed by: STUDENT IN AN ORGANIZED HEALTH CARE EDUCATION/TRAINING PROGRAM

## 2023-07-29 RX ORDER — PANTOPRAZOLE SODIUM 40 MG/1
40 TABLET, DELAYED RELEASE ORAL
Status: DISCONTINUED | OUTPATIENT
Start: 2023-07-30 | End: 2023-07-29 | Stop reason: HOSPADM

## 2023-07-29 RX ORDER — ISOSORBIDE MONONITRATE 30 MG/1
30 TABLET, EXTENDED RELEASE ORAL DAILY
Qty: 30 TABLET | Refills: 0 | Status: ON HOLD | OUTPATIENT
Start: 2023-07-29 | End: 2023-08-30 | Stop reason: HOSPADM

## 2023-07-29 RX ORDER — FUROSEMIDE 20 MG/1
20 TABLET ORAL EVERY OTHER DAY
Status: DISCONTINUED | OUTPATIENT
Start: 2023-07-30 | End: 2023-07-29 | Stop reason: HOSPADM

## 2023-07-29 RX ORDER — CLOPIDOGREL BISULFATE 75 MG/1
75 TABLET ORAL DAILY
Qty: 30 TABLET | Refills: 0 | Status: ON HOLD | OUTPATIENT
Start: 2023-07-30 | End: 2023-08-30 | Stop reason: SDUPTHER

## 2023-07-29 RX ORDER — METOPROLOL TARTRATE 75 MG/1
75 TABLET, FILM COATED ORAL 3 TIMES DAILY
Qty: 90 TABLET | Refills: 0 | Status: ON HOLD | OUTPATIENT
Start: 2023-07-29 | End: 2023-08-30 | Stop reason: HOSPADM

## 2023-07-29 RX ORDER — POTASSIUM CHLORIDE 750 MG/1
10 CAPSULE, EXTENDED RELEASE ORAL EVERY OTHER DAY
Qty: 15 CAPSULE | Refills: 0 | Status: SHIPPED | OUTPATIENT
Start: 2023-07-29

## 2023-07-29 RX ORDER — REGADENOSON 0.08 MG/ML
0.4 INJECTION, SOLUTION INTRAVENOUS
Status: COMPLETED | OUTPATIENT
Start: 2023-07-29 | End: 2023-07-29

## 2023-07-29 RX ORDER — FUROSEMIDE 20 MG/1
20 TABLET ORAL EVERY OTHER DAY
Qty: 15 TABLET | Refills: 0 | Status: SHIPPED | OUTPATIENT
Start: 2023-07-30 | End: 2023-08-01 | Stop reason: SDUPTHER

## 2023-07-29 RX ORDER — ISOSORBIDE MONONITRATE 30 MG/1
30 TABLET, EXTENDED RELEASE ORAL DAILY
Status: DISCONTINUED | OUTPATIENT
Start: 2023-07-29 | End: 2023-07-29 | Stop reason: HOSPADM

## 2023-07-29 RX ADMIN — NITROGLYCERIN 1 INCH: 20 OINTMENT TOPICAL at 05:07

## 2023-07-29 RX ADMIN — NITROGLYCERIN 1 INCH: 20 OINTMENT TOPICAL at 12:07

## 2023-07-29 RX ADMIN — COLCHICINE 0.3 MG: 0.6 TABLET, FILM COATED ORAL at 10:07

## 2023-07-29 RX ADMIN — ATORVASTATIN CALCIUM 40 MG: 40 TABLET, FILM COATED ORAL at 10:07

## 2023-07-29 RX ADMIN — ISOSORBIDE MONONITRATE 30 MG: 30 TABLET, EXTENDED RELEASE ORAL at 04:07

## 2023-07-29 RX ADMIN — REGADENOSON 0.4 MG: 0.08 INJECTION, SOLUTION INTRAVENOUS at 09:07

## 2023-07-29 RX ADMIN — CLOPIDOGREL BISULFATE 75 MG: 75 TABLET, FILM COATED ORAL at 10:07

## 2023-07-29 RX ADMIN — RANOLAZINE 500 MG: 500 TABLET, EXTENDED RELEASE ORAL at 10:07

## 2023-07-29 RX ADMIN — HEPARIN SODIUM 5000 UNITS: 5000 INJECTION INTRAVENOUS; SUBCUTANEOUS at 05:07

## 2023-07-29 RX ADMIN — CALCIUM CARBONATE (ANTACID) CHEW TAB 500 MG 500 MG: 500 CHEW TAB at 12:07

## 2023-07-29 RX ADMIN — ARFORMOTEROL TARTRATE 15 MCG: 15 SOLUTION RESPIRATORY (INHALATION) at 12:07

## 2023-07-29 RX ADMIN — HEPARIN SODIUM 5000 UNITS: 5000 INJECTION INTRAVENOUS; SUBCUTANEOUS at 02:07

## 2023-07-29 RX ADMIN — METOPROLOL TARTRATE 75 MG: 50 TABLET, FILM COATED ORAL at 10:07

## 2023-07-29 RX ADMIN — METOPROLOL TARTRATE 75 MG: 50 TABLET, FILM COATED ORAL at 02:07

## 2023-07-29 RX ADMIN — ALLOPURINOL 50 MG: 300 TABLET ORAL at 10:07

## 2023-07-29 NOTE — CARE UPDATE
07/29/23 1232   Patient Assessment/Suction   Level of Consciousness (AVPU) alert   Respiratory Effort Normal;Unlabored   All Lung Fields Breath Sounds clear   Rhythm/Pattern, Respiratory unlabored;pattern regular;depth regular   PRE-TX-O2   Device (Oxygen Therapy) nasal cannula   $ Is the patient on Low Flow Oxygen? Yes   Flow (L/min) 3   SpO2 97 %   Pulse Oximetry Type Intermittent   $ Pulse Oximetry - Multiple Charge Pulse Oximetry - Multiple   Pulse 66   Resp 18   Aerosol Therapy   $ Aerosol Therapy Charges Aerosol Treatment  (BROVANA)   Daily Review of Necessity (SVN) completed   Respiratory Treatment Status (SVN) given   Treatment Route (SVN) mask;oxygen   Patient Position (SVN) HOB elevated   Post Treatment Assessment (SVN) breath sounds unchanged;vital signs unchanged   Signs of Intolerance (SVN) none   Education   $ Education Bronchodilator;15 min   Respiratory Evaluation   $ Care Plan Tech Time 15 min   Home Oxygen   Has Home Oxygen? Yes   Liter Flow 4   Duration continuous

## 2023-07-29 NOTE — CARE UPDATE
07/28/23 2016   PRE-TX-O2   SpO2 96 %   Pulse 70   Resp 18   Respiratory Evaluation   $ Care Plan Tech Time 15 min   $ Eval/Re-eval Charges Re-evaluation

## 2023-07-29 NOTE — PLAN OF CARE
Problem: Adult Inpatient Plan of Care  Goal: Plan of Care Review  Outcome: Met  Goal: Patient-Specific Goal (Individualized)  Outcome: Met  Goal: Absence of Hospital-Acquired Illness or Injury  Outcome: Met  Goal: Optimal Comfort and Wellbeing  Outcome: Met  Goal: Readiness for Transition of Care  Outcome: Met     Problem: Diabetes Comorbidity  Goal: Blood Glucose Level Within Targeted Range  Outcome: Met     Problem: Impaired Wound Healing  Goal: Optimal Wound Healing  Outcome: Met     Problem: Skin Injury Risk Increased  Goal: Skin Health and Integrity  Outcome: Met

## 2023-07-29 NOTE — DISCHARGE SUMMARY
"[facility] Medicine  Discharge Summary      Patient Name: Marisol Kerr  MRN: [id number]  YURY: [id number]  Patient Class: IP- Inpatient  Admission Date: 7/27/2023  Discharge Date and Time: 7/29/2023  6:34 PM  Discharging Provider: Vidya Lopez MD  Primary Care Provider: Khadar Dwyer MD    Pt admitted for:     Chest pain, resolved  Severe CAD, HFpEF 60%  Chronic respiratory failure 2LNC, with COPD  ISSA on CKD, improving  - serial EKG  - telemetry  - plavix, statin, BB, ranexa, nitroglycerin  - lexiscan without reversible ischemia  - cardiology following, thank you: pt to follow-up on outpt basis    Pt improved during inpt stay.  Pt received maximum benefit to inpt stay.  Patient was seen and examined on the date of discharge and determined to be suitable for discharge.    BP (!) 145/61 (BP Location: Right arm, Patient Position: Lying)   Pulse (!) 51   Temp 98.3 °F (36.8 °C) (Oral)   Resp 18   Ht 5' 3" (1.6 m)   Wt 66.5 kg (146 lb 11.2 oz)   SpO2 99%   BMI 25.99 kg/m²     Gen: alert, responsive  HEENT:  Eyes - no pallor  External ears with no lesions  Nares patent  Mouth, Throat:  trachea midline   CV: RRR  Lungs: CTA B/L  Abd: +BS, soft, NT, ND  Ext: no atrophy or edema  Skin: warm, dry  Neuro: grossly intact  Psych: pleasant    Final Active Diagnoses:    Diagnosis Date Noted POA    PRINCIPAL PROBLEM:  CAD (coronary artery disease) [I25.10] 06/26/2014 Yes    Elevated troponin [R77.8] 07/29/2023 Yes    Abnormal EKG [R94.31] 05/26/2023 Yes      Problems Resolved During this Admission:    Diagnosis Date Noted Date Resolved POA    Chest pain in adult [R07.9] 09/17/2019 07/29/2023 Yes       Discharged Condition: stable    Disposition: Home-Health Care Svc    Follow Up:   Follow-up Information       [place] Follow up.    Specialty: Home Health Services  Why: Home Health  Contact information:  [address] [place] [address]  [phone]       "         Khadar Dwyer MD Follow up on 8/1/2023.    Specialties: Family Medicine, Home Health Services, Hospice Services  Why: 11:40 am Hospital follow up  Contact information:  [address] [facility]  [address]  [facility]  [place] [address]  [phone]               Lexi Langley MD. Schedule an appointment as soon as possible for a visit.    Specialties: Interventional Cardiology, Cardiology  Why: FOR CHEST PAIN FOLLOW-UP  Contact information:  [address]  [address]  [place]IOLOGY [place]  [place] [address]  [phone]                           Patient Instructions:      Ambulatory referral/consult to Cardiology   Standing Status: Future   Referral Priority: Routine Referral Type: Consultation   Referral Reason: Specialty Services Required   Referred to Provider: LEXI LANGLEY Requested Specialty: Cardiology   Number of Visits Requested: 1     Ambulatory referral/consult to Home Health   Standing Status: Future   Referral Priority: Routine Referral Type: Home Health   Referral Reason: Specialty Services Required   Requested Specialty: Home Health Services   Number of Visits Requested: 1     Pending Diagnostic Studies:       None           Medications:  Reconciled Home Medications:      Medication List        START taking these medications      clopidogreL 75 mg tablet  Commonly known as: PLAVIX  Take 1 tablet (75 mg total) by mouth once daily.     INCRUSE ELLIPTA 62.5 mcg/actuation inhalation capsule  Generic drug: umeclidinium  Inhale 62.5 mcg into the lungs every morning. Controller            CHANGE how you take these medications      ergocalciferol 50,000 unit Cap  Commonly known as: VITAMIN D2  Take 1 capsule (50,000 Units total) by mouth every 7 days.  What changed: additional instructions     isosorbide mononitrate 30 MG 24 hr tablet  Commonly known as: IMDUR  Take 1 tablet (30 mg total) by mouth once daily.  What changed:   medication strength  how much to take     metoprolol  tartrate 75 mg Tab  Take 1 tablet (75 mg total) by mouth 3 (three) times daily.  What changed:   medication strength  how much to take  when to take this     mupirocin 2 % ointment  Commonly known as: BACTROBAN  Apply topically 2 (two) times daily.  What changed: how much to take     potassium chloride 10 MEQ Cpsr  Commonly known as: MICRO-K  Take 1 capsule (10 mEq total) by mouth every other day. (TAKE WITH LASIX/FUROSEMIDE)  What changed:   when to take this  additional instructions            CONTINUE taking these medications      albuterol 90 mcg/actuation inhaler  Commonly known as: VENTOLIN HFA  Inhale 2 puffs into the lungs every 6 (six) hours as needed for Wheezing or Shortness of Breath. Rescue     * albuterol-ipratropium 2.5 mg-0.5 mg/3 mL nebulizer solution  Commonly known as: DUO-NEB  Take 3 mLs by nebulization every 4 (four) hours as needed for Wheezing or Shortness of Breath. Rescue     * CombiVENT RESPIMAT  mcg/actuation inhaler  Generic drug: ipratropium-albuteroL  Inhale 2 puffs into the lungs every 4 (four) hours as needed for Shortness of Breath. Rescue     allopurinoL 100 MG tablet  Commonly known as: ZYLOPRIM  Take 0.5 tablets (50 mg total) by mouth once daily.     ALPRAZolam 0.25 MG tablet  Commonly known as: XANAX  Take 1 tablet (0.25 mg total) by mouth every evening.     atorvastatin 40 MG tablet  Commonly known as: LIPITOR  Take 1 tablet (40 mg total) by mouth once daily.     coenzyme Q10 100 mg capsule  Take 100 mg by mouth every morning.     colchicine 0.6 mg tablet  Commonly known as: COLCRYS  Take 0.3 mg by mouth every other day.     ferrous sulfate 325 (65 FE) MG EC tablet  Take 325 mg by mouth once daily.     fish oil-omega-3 fatty acids 300-1,000 mg capsule  Take 2 capsules by mouth every morning.     fluticasone-salmeterol 250-50 mcg/dose 250-50 mcg/dose diskus inhaler  Commonly known as: ADVAIR  Inhale 1 puff into the lungs 2 (two) times a day.     ketoconazole 2 %  cream  Commonly known as: NIZORAL  Apply 1 application  topically 2 (two) times daily.     magnesium oxide 400 mg (241.3 mg magnesium) tablet  Commonly known as: MAG-OX  Take 1 tablet by mouth 2 (two) times daily.     nitroGLYCERIN 0.4 MG/DOSE TL SPRY 400 mcg/spray spray  Commonly known as: NITROLINGUAL  Place 1 spray under the tongue every 5 (five) minutes as needed for Chest pain (max of 3 doses).     pantoprazole 40 MG tablet  Commonly known as: PROTONIX  Take 1 tablet (40 mg total) by mouth once daily.     polycarbophil 625 mg tablet  Commonly known as: FIBERCON  Take 625 mg by mouth once daily.     PRESERVISION AREDS-2 ORAL  Take 1 tablet by mouth once daily.     ranolazine 500 MG Tb12  Commonly known as: RANEXA  Take 1 tablet (500 mg total) by mouth 2 (two) times daily.     triamcinolone acetonide 0.1% 0.1 % cream  Commonly known as: KENALOG  Apply 1 g topically 2 (two) times daily.     VITAMIN C 500 MG tablet  Generic drug: ascorbic acid (vitamin C)  Take 500 mg by mouth 2 (two) times daily.           * This list has 2 medication(s) that are the same as other medications prescribed for you. Read the directions carefully, and ask your doctor or other care provider to review them with you.                STOP taking these medications      diltiaZEM 120 MG Cp24  Commonly known as: CARDIZEM CD     furosemide 40 MG tablet  Commonly known as: LASIX     loperamide 2 mg capsule  Commonly known as: IMODIUM     PREVALITE 4 gram Pwpk  Generic drug: cholestyramine-aspartame     SUPER MULTIVITAMIN per tablet  Generic drug: multivitamin     ticagrelor 90 mg tablet  Commonly known as: BRILINTA     VITRON-C ORAL            Time spent on the discharge of patient: 33 minutes    Vidya Lopez MD  Department of Hospital Medicine  On license of [facility]

## 2023-07-29 NOTE — NURSING
Pt verbalized understanding of discharge instructions, IV and tele box removed. Box returned to cardio B. Pt wheeled down on 2L of oxygen to private vehicle with personal belongings.

## 2023-07-29 NOTE — CARE UPDATE
07/28/23 2016   PRE-TX-O2   Device (Oxygen Therapy) room air   Flow (L/min) 2   SpO2 96 %   Pulse Oximetry Type Intermittent   $ Pulse Oximetry - Multiple Charge Pulse Oximetry - Multiple   Pulse 70   Resp 18   Aerosol Therapy   $ Aerosol Therapy Charges Aerosol Treatment   Daily Review of Necessity (SVN) completed   Respiratory Treatment Status (SVN) given   Treatment Route (SVN) mask;oxygen   Patient Position (SVN) HOB elevated   Post Treatment Assessment (SVN) increased aeration   Signs of Intolerance (SVN) none   Respiratory Evaluation   $ Care Plan Tech Time 15 min   $ Eval/Re-eval Charges Re-evaluation        Cyclosporine Counseling:  I discussed with the patient the risks of cyclosporine including but not limited to hypertension, gingival hyperplasia,myelosuppression, immunosuppression, liver damage, kidney damage, neurotoxicity, lymphoma, and serious infections. The patient understands that monitoring is required including baseline blood pressure, CBC, CMP, lipid panel and uric acid, and then 1-2 times monthly CMP and blood pressure.

## 2023-07-29 NOTE — PLAN OF CARE
07/29/23 1644   Final Note   Assessment Type Final Discharge Note   Anticipated Discharge Disposition Home-Health   What phone number can be called within the next 1-3 days to see how you are doing after discharge? [phone]   Post-Acute Status   Post-Acute Authorization Home Health   Home Health Status Set-up Complete/Auth obtained   Discharge Delays None known at this time     D/c orders faxed. [facility] informed informed they will contact pt regarding SOC.    Patient cleared for discharge from case management standpoint.    no further case management needs.

## 2023-07-29 NOTE — CARE UPDATE
07/28/23 2016   PRE-TX-O2   Device (Oxygen Therapy) room air   Flow (L/min) 2   SpO2 96 %   Pulse Oximetry Type Intermittent   $ Pulse Oximetry - Multiple Charge Pulse Oximetry - Multiple   Pulse 70   Resp 18   Aerosol Therapy   $ Aerosol Therapy Charges Aerosol Treatment   Daily Review of Necessity (SVN) completed   Respiratory Treatment Status (SVN) given   Treatment Route (SVN) mask;oxygen   Patient Position (SVN) HOB elevated   Post Treatment Assessment (SVN) increased aeration   Signs of Intolerance (SVN) none   Respiratory Evaluation   $ Care Plan Tech Time 15 min   $ Eval/Re-eval Charges Re-evaluation

## 2023-07-29 NOTE — NURSING
Pt assisted to BSC, purewick removed. New brief applied. Pt assisted to wheelchair for transport to stress test.

## 2023-07-29 NOTE — PROGRESS NOTES
"[facility]  Progress Note    Patient Name: Marisol Kerr  MRN: [id number]  Admission Date: 7/27/2023  2:40 AM  Attending Physician: Vidya Lopez MD  Face-to-Face encounter date: 07/29/2023    Assessment & Plan:   Marisol Kerr is a 78 y.o. female with:    Active Hospital Problems    Diagnosis  POA    *Chest pain in adult [R07.9]  Unknown      Resolved Hospital Problems   No resolved problems to display.     Chest pain  Severe CAD, HFpEF 60%  Chronic respiratory failure 2LNC, with COPD  ISSA on CKD, improving  - serial EKG  - telemetry  - plavix, statin, BB, ranexa, nitroglycerin   - continue holding lasix till tomorrow, trending BMP  - lexiscan today   - cardiology following, thank you    DM2  - glucose appears controlled without SSI  - trending BMP    Other chronic conditions including but not limited to those noted above/below:  All home meds reviewed personally by me, and adjusted as appropriate.  Electrolyte derangement:  Trending BMP, Mg; Replacement prn  DVT ppx:  heparin  Dispo:  Home when medically stable.  Will need follow-up with their PCP.    FULL CODE    - The above conditions include an acute and/or chronic illness that poses a threat to life (or bodily function).  - Previous notes/encounters/external records reviewed personally by me.  - Pt's case personally discussed with the : discharge planning    Subjective:      Interval History:      7/28:  pt is very hard of hearing and can't answer my questions.  The batteries for her hearing aids are on route.  She appears comfortable.      7:29:  pt has her hearing aids today.  No CP, no SOB, no diaphoresis.      Review of Systems   All systems reviewed and are negative except as noted per above.    Objective:     Physical Exam  BP (!) 146/75 (BP Location: Right arm, Patient Position: Lying)   Pulse (!) 52   Temp 97.9 °F (36.6 °C) (Oral)   Resp 16   Ht 5' 3" (1.6 m)   Wt 66.5 kg (146 lb 11.2 oz)   SpO2 97%   " "BMI 25.99 kg/m²     Gen: alert, responsive   HEENT:  Eyes - no pallor  External ears with no lesions  Nares patent  Mouth, Throat:  trachea midline  CV: RRR  Lungs: CTA B/L  Abd: +BS, soft, NT, ND  Ext: no atrophy or edema  Skin: warm, dry  Neuro: grossly intact  Psych: pleasant    Recent Labs   Lab 07/29/23  0631   WBC 6.71   HGB 9.8*   HCT 32.7*          Recent Labs   Lab 07/29/23  0630   CALCIUM 8.7      K 4.0   CO2 32*      BUN 35*   CREATININE 2.0*       No results found for: "POCTGLUCOSE"   Microbiology Results (last 7 days)       ** No results found for the last 168 hours. **          Imaging Results              X-Ray Chest AP Portable (Final result)  Result time 07/27/23 07:22:58      Final result by García Russell MD (07/27/23 07:22:58)                   Narrative:    CLINICAL HISTORY:  78 years (1945) Female Chest Pain Chest pain, SOB    TECHNIQUE:  Portable AP radiograph the chest. One view.    COMPARISON:  Radiograph from July 21, 2023.    FINDINGS:  Mild scattered reticular interstitial lung markings at both lung bases, slightly improved from the previous exam.  No pneumothorax is identified. The heart is top normal in size. Atheromatous calcifications are seen at the aortic arch. Osseous structures show degenerative changes in the spine. The visualized upper abdomen is unremarkable.    IMPRESSION:  Mild nonspecific scattered reticular interstitial markings at both lung bases, slightly improved from the previous exam suggesting examination of atelectasis, scarring, trace edema or mild atypical infection/viral pneumonia in the appropriate clinical setting.                  .            Electronically signed by:  García Russell MD  7/27/2023 7:22 AM CDT Workstation: [id number]                                     Labs and images reviewed personally by me.  See my personal assessment/interpretation above under "Assessment & Plan."    Vidya Lopez MD  [facility] Hospitalist      "

## 2023-07-29 NOTE — NURSING
1032  pt back from Nuclear stress test, Pure wick reapplied, brief changed, pt repositioned in bed. Breakfast tray ordered, morning medications given.  Pt comfortable, no other needs at this time. Will continue to monitor.

## 2023-07-29 NOTE — PROGRESS NOTES
[facility]  Department of Cardiology  Progress Note    PATIENT NAME: Marisol Kerr  MRN: [id number]  TODAY'S DATE: 07/29/2023  ADMIT DATE: 7/27/2023    SUBJECTIVE     PRINCIPLE PROBLEM: Chest pain in adult    INTERVAL HISTORY:    7/29/2023  Pt seen/examined in stress test. Experienced some chest discomfort w/ lexiscan  VSS overnight, SR/SB on telemetry/EKG, no events overnight    Review of patient's allergies indicates:   Allergen Reactions    Iodinated contrast media     Strawberries [strawberry] Hives and Itching       REVIEW OF SYSTEMS  CARDIOVASCULAR: No recent chest pain, palpitations, arm, neck, or jaw pain  RESPIRATORY: No recent fever, cough chills, or congestion; chronic O2 use at home   : No blood in the urine  GI: No Nausea, vomiting, constipation, diarrhea, blood, or reflux.  MUSCULOSKELETAL: No myalgias  NEURO: No lightheadedness or dizziness  EYES: No Double vision, blurry, vision or headache     OBJECTIVE     VITAL SIGNS (Most Recent)  Temp: 97.9 °F (36.6 °C) (07/29/23 0715)  Pulse: (!) 52 (07/29/23 0715)  Resp: 16 (07/29/23 0715)  BP: (!) 146/75 (07/29/23 0715)  SpO2: 97 % (07/29/23 0715)    VENTILATION STATUS  Resp: 16 (07/29/23 0715)  SpO2: 97 % (07/29/23 0715)           I & O (Last 24H):  Intake/Output Summary (Last 24 hours) at 7/29/2023 0938  Last data filed at 7/29/2023 0911  Gross per 24 hour   Intake 805 ml   Output 700 ml   Net 105 ml       WEIGHTS  Wt Readings from Last 3 Encounters:   07/27/23 1742 66.5 kg (146 lb 11.2 oz)   07/27/23 0315 68 kg (150 lb)   07/27/23 1110 68 kg (150 lb)   07/23/23 0348 68.3 kg (150 lb 9.2 oz)   07/22/23 0957 69.6 kg (153 lb 7 oz)   07/21/23 2006 68.9 kg (152 lb)       PHYSICAL EXAM  CONSTITUTIONAL: Pleasant elderly female, [place], breathing comfortably on 4 lPM in no apparent distress  NECK: right carotid bruit, no JVD  LUNGS: diminished bases, 4 LPM O2 in use; no cough; mild brief dyspnea w/ lexiscan which quickly resolved  CHEST WALL: no  "tenderness  HEART: regular rate and rhythm, S1, S2 normal, no murmur, click, rub or gallop   ABDOMEN: soft, non-tender; bowel sounds normal; no masses,  no organomegaly  EXTREMITIES: Extremities no edema. Discoloration near lower legs appears like chronic venous stasis  NEURO: AAO X 3    SCHEDULED MEDS:   allopurinoL  50 mg Oral Daily    ALPRAZolam  0.25 mg Oral QHS    arformoteroL  15 mcg Nebulization BID    atorvastatin  40 mg Oral Daily    budesonide  0.5 mg Nebulization Q12H    clopidogreL  75 mg Oral Daily    colchicine  0.3 mg Oral Every other day    furosemide  20 mg Oral Every other day    heparin (porcine)  5,000 Units Subcutaneous Q8H    metoprolol tartrate  75 mg Oral TID    nitroGLYCERIN 2% TD oint  1 inch Topical (Top) Q6H    ranolazine  500 mg Oral BID       CONTINUOUS INFUSIONS:    PRN MEDS:albuterol sulfate, calcium carbonate, dextrose 50%, dextrose 50%, glucagon (human recombinant), glucose, glucose, labetalol, naloxone, nitroGLYCERIN 0.4 MG/DOSE TL SPRY, sodium chloride 0.9%    LABS AND DIAGNOSTICS     CBC LAST 3 DAYS  Recent Labs   Lab 07/27/23 0308 07/28/23 0327 07/29/23  0631   WBC 9.22 7.50 6.71   RBC 2.95* 2.91* 3.44*   HGB 8.5* 8.4* 9.8*   HCT 28.2* 27.9* 32.7*   MCV 96 96 95   MCH 28.8 28.9 28.5   MCHC 30.1* 30.1* 30.0*   RDW 15.3* 15.4* 15.1*    195 204   MPV 11.7 12.3 11.9   GRAN 83.0*  7.7  --   --    LYMPH 9.4*  0.9*  --   --    MONO 6.0  0.6  --   --    BASO 0.05  --   --    NRBC 0  --   --        COAGULATION LAST 3 DAYS  No results for input(s): "LABPT", "INR", "APTT" in the last 168 hours.    CHEMISTRY LAST 3 DAYS  Recent Labs   Lab 07/27/23 0308 07/28/23  0327 07/29/23  0630    142 138   K 4.3 4.2 4.0    104 100   CO2 30* 34* 32*   ANIONGAP 10 4* 6*   BUN 37* 33* 35*   CREATININE 2.0* 2.2* 2.0*   * 89 85   CALCIUM 8.9 8.6* 8.7   MG 2.1 2.1 2.1   ALBUMIN 3.2*  --   --    PROT 6.3  --   --    ALKPHOS 181*  --   --    ALT 18  --   --    AST 22  --   --  " "  BILITOT 0.6  --   --        CARDIAC PROFILE LAST 3 DAYS  Recent Labs   Lab 07/27/23  0308   *       ENDOCRINE LAST 3 DAYS  No results for input(s): "TSH", "PROCAL" in the last 168 hours.    LAST ARTERIAL BLOOD GAS  ABG  No results for input(s): "PH", "PO2", "PCO2", "HCO3", "BE" in the last 168 hours.    LAST 7 DAYS MICROBIOLOGY   Microbiology Results (last 7 days)       ** No results found for the last 168 hours. **            MOST RECENT IMAGING  US Carotid Bilateral  CMS MANDATED QUALITY DATA - CAROTID - 195    All measurements and percent stenosis described below were determined using NASCET criteria or criteria similar to NASCET, as defined by the Society of Radiologists in Ultrasound Consensus Conference, Radiology, 2003    US CAROTID DOPPLER BILATERAL    CLINICAL HISTORY:  78 years Female carotid steniosis    COMPARISON: February 18, 2016    FINDINGS: Grayscale, color Doppler and  spectral Doppler evaluation of the carotid arteries was performed. Bilateral carotid artery atherosclerotic plaque.    Peak systolic velocity in the right CCA is 80.3 cm/sec, and peak systolic velocity in the right ICA is 184.3 cm/sec, with abnormal ICA/CCA ratio of 2.3. Mildly elevated maximum end-diastolic velocity of the mid right ICA 40.2 cm/s.    Peak systolic velocity in the left CCA is 99.3 cm/sec, and peak systolic velocity in the left ICA is 98.8 cm/sec, with ICA/CCA ratio of less than 2. Normal maximum end-diastolic velocities of the left ICA.    Elevated peak systolic velocities within the external carotid arteries bilaterally, left greater than right.    The vertebral arteries are patent, with normal waveforms, and normal antegrade flow bilaterally.    IMPRESSION:    Abnormal velocities and IC/CC ratio on the right, consistent with 50-69% right internal carotid artery stenosis.    Elevated external carotid artery peak systolic velocities bilaterally, greater on the left, suggesting ECA stenosis.    Patent " antegrade flow within the vertebral arteries bilaterally.    Electronically signed by:  Rajinder Jaramillo MD  7/28/2023 7:20 AM CDT Workstation: [id number]      ROMAINE  Results for orders placed during the hospital encounter of 07/27/23    Echo    Interpretation Summary  · Normal systolic function.  · Normal left ventricular diastolic function.  · The estimated ejection fraction is 60%.  · Atrial fibrillation not observed.  · Normal right ventricular size with normal right ventricular systolic function.  · Mild left atrial enlargement.  · Aortic valve is calcified but there does not appear to be significant aortic stenosis-  · Aortic valve area is cm2; peak velocity is 1.47 m/s; mean gradient is 5 mmHg.  · Mild mitral regurgitation.  · Mild tricuspid regurgitation.  · Normal central venous pressure (3 mmHg).  · The estimated PA systolic pressure is 20 mmHg.      CURRENT/PREVIOUS VISIT EKG  Results for orders placed or performed during the hospital encounter of 07/27/23   EKG 12-lead    Collection Time: 07/27/23  5:54 AM    Narrative    Test Reason : R94.31,    Vent. Rate : 093 BPM     Atrial Rate : 093 BPM     P-R Int : 180 ms          QRS Dur : 088 ms      QT Int : 348 ms       P-R-T Axes : 074 069 096 degrees     QTc Int : 432 ms    Normal sinus rhythm  Nonspecific ST abnormality  Abnormal ECG  When compared with ECG of 27-JUL-2023 02:47,  ST no longer depressed in Inferior leads  T wave inversion no longer evident in Inferior leads  Confirmed by Javed MOE, Khadar JUÁREZ ([id number]) on 7/28/2023 8:13:39 PM    Referred By: [email or web address]   SELF           Confirmed By:Khadar Burt MD       ASSESSMENT/PLAN:     Active Hospital Problems    Diagnosis    *Chest pain in adult       ASSESSMENT & PLAN:   Chest pain  CAD  HFpEF  Bilateral carotid stenosis  COPD  CKD  DM2    RECOMMENDATIONS:  Stress test this AM to rule out myocardial ischemia. Await test results for further recommendations  2.   Echocardiogram showed good Efx (60%) and  aortic valve calcification but no AS.  3.   History of HFpEF. . Pt appears euvolemic on exam. Chest xray showed atelectasis vs trace edema vs scarring.   4.   Pt has CKD. Continue metoprolol 75 mg TID for HFpEF. Continue strict I/O, daily weights.   5. History of CAD. Continue Plavix 75 mg daily, atorvastatin 40 mg qHS and Ranexa 500 mg BID    Arlin Murray NP  [facility]  Department of Cardiology  Date of Service: 07/29/2023        Myocardial perfusion study did not show any evidence of ischemia.  Results are as follows  Gated SPECT images show normal left ventricular wall motion. Calculated left ventricular ejection fraction is 68 %.     IMPRESSION:     1. No scintigraphic evidence of myocardial ischemia.  2. Normal left ventricular wall motion.  3. Calculated left ventricular ejection fraction of 68 %.     Electronically signed by:  Pasha Lee DO  7/29/2023 10:56 AM CDT Workstation: [id number]       Pantoprazole 40 mg daily to her regimen and also   Isosorbide mononitrate 30 mg to the regimen.  I have personally interviewed and examined the patient.  I have reviewed all the Nurse Practitioner's documentation, and agree with the plan.   Patient is encouraged to follow-up in the office further optimization of her therapy  Consider monitoring her hemoglobin closely as an outpatient for any worsening anemia      Dr. Gaetano Langley M.D.  [facility]  Department of Cardiology  Date of Service: 07/29/2023  9:38 AM

## 2023-07-31 ENCOUNTER — TELEPHONE (OUTPATIENT)
Dept: FAMILY MEDICINE | Facility: CLINIC | Age: 78
End: 2023-07-31
Payer: MEDICARE

## 2023-07-31 ENCOUNTER — DOCUMENT SCAN (OUTPATIENT)
Dept: HOME HEALTH SERVICES | Facility: HOSPITAL | Age: 78
End: 2023-07-31
Payer: MEDICARE

## 2023-08-01 ENCOUNTER — OFFICE VISIT (OUTPATIENT)
Dept: FAMILY MEDICINE | Facility: CLINIC | Age: 78
End: 2023-08-01
Attending: FAMILY MEDICINE
Payer: MEDICARE

## 2023-08-01 VITALS — DIASTOLIC BLOOD PRESSURE: 60 MMHG | HEART RATE: 60 BPM | SYSTOLIC BLOOD PRESSURE: 120 MMHG

## 2023-08-01 DIAGNOSIS — E11.42 DIABETIC PERIPHERAL NEUROPATHY: ICD-10-CM

## 2023-08-01 DIAGNOSIS — I25.118 CORONARY ARTERY DISEASE OF NATIVE ARTERY OF NATIVE HEART WITH STABLE ANGINA PECTORIS: ICD-10-CM

## 2023-08-01 DIAGNOSIS — M16.0 PRIMARY OSTEOARTHRITIS OF BOTH HIPS: ICD-10-CM

## 2023-08-01 DIAGNOSIS — I10 ESSENTIAL HYPERTENSION, BENIGN: ICD-10-CM

## 2023-08-01 DIAGNOSIS — J44.1 COPD EXACERBATION: ICD-10-CM

## 2023-08-01 DIAGNOSIS — I50.41 ACUTE COMBINED SYSTOLIC AND DIASTOLIC CONGESTIVE HEART FAILURE: ICD-10-CM

## 2023-08-01 DIAGNOSIS — Z85.038 HISTORY OF COLON CANCER: ICD-10-CM

## 2023-08-01 DIAGNOSIS — H91.90 HEARING LOSS, UNSPECIFIED HEARING LOSS TYPE, UNSPECIFIED LATERALITY: Chronic | ICD-10-CM

## 2023-08-01 DIAGNOSIS — E11.8 DM TYPE 2, CONTROLLED, WITH COMPLICATION: ICD-10-CM

## 2023-08-01 DIAGNOSIS — Z09 HOSPITAL DISCHARGE FOLLOW-UP: ICD-10-CM

## 2023-08-01 DIAGNOSIS — I20.0 UNSTABLE ANGINA: ICD-10-CM

## 2023-08-01 DIAGNOSIS — I50.33 CONGESTIVE HEART FAILURE WITH LEFT VENTRICULAR DIASTOLIC DYSFUNCTION, ACUTE ON CHRONIC: Primary | ICD-10-CM

## 2023-08-01 DIAGNOSIS — I50.32 CHRONIC DIASTOLIC CONGESTIVE HEART FAILURE: ICD-10-CM

## 2023-08-01 DIAGNOSIS — I21.4 NSTEMI (NON-ST ELEVATED MYOCARDIAL INFARCTION): ICD-10-CM

## 2023-08-01 DIAGNOSIS — E78.00 HYPERCHOLESTEROLEMIA: ICD-10-CM

## 2023-08-01 DIAGNOSIS — M81.0 OSTEOPOROSIS, POST-MENOPAUSAL: ICD-10-CM

## 2023-08-01 DIAGNOSIS — Z74.09 IMPAIRED FUNCTIONAL MOBILITY AND ENDURANCE: ICD-10-CM

## 2023-08-01 DIAGNOSIS — J44.9 CHRONIC OBSTRUCTIVE PULMONARY DISEASE, UNSPECIFIED COPD TYPE: Chronic | ICD-10-CM

## 2023-08-01 DIAGNOSIS — N18.4 CHRONIC KIDNEY DISEASE, STAGE 4 (SEVERE): ICD-10-CM

## 2023-08-01 DIAGNOSIS — R60.0 LOCALIZED EDEMA: ICD-10-CM

## 2023-08-01 PROCEDURE — 99496 TRANSJ CARE MGMT HIGH F2F 7D: CPT | Mod: S$GLB,,, | Performed by: FAMILY MEDICINE

## 2023-08-01 PROCEDURE — 99496 TRANSITIONAL CARE MANAGE SERVICE 7 DAY DISCHARGE: ICD-10-PCS | Mod: S$GLB,,, | Performed by: FAMILY MEDICINE

## 2023-08-01 RX ORDER — FUROSEMIDE 20 MG/1
20 TABLET ORAL EVERY OTHER DAY
Qty: 30 TABLET | Refills: 4 | Status: SHIPPED | OUTPATIENT
Start: 2023-08-01 | End: 2024-07-31

## 2023-08-01 NOTE — PROGRESS NOTES
Subjective     Patient ID: Marisol Kerr is a 78 y.o. female.    Chief Complaint: Hospital Follow Up (Brought bottles, discuss medication, requested Eye exam Dr Shukla, declined Dexa, abc)    HPI  78-year-old with history of colon cancer diabetes COPD coronary artery disease who presents 3 days after discharge from a hospital stay.    Patient was admitted for chest pain, EKG showed ST abnormalities; troponins were  elevated at 68.  The angiography was performed but she did have a NM myocardial perfusion SPECT which showed ejection fraction 68 and normal left ventricular wall motion with absence of scintigraphic evidence for myocardial ischemia.  This was most likely UA-NSTEMI    Today she denies chest pain and shortness at breath.  She is on 2 L of oxygen today.    She complains of bruising easily when she bumps into things.    Review of Systems   Respiratory:  Negative for shortness of breath.    Cardiovascular:  Negative for chest pain.   Gastrointestinal:  Positive for diarrhea. Negative for abdominal pain and constipation.   Hematological:  Bruises/bleeds easily.          Objective     Physical Exam  Cardiovascular:      Rate and Rhythm: Normal rate and regular rhythm.      Pulses: Normal pulses.      Heart sounds: Normal heart sounds.   Pulmonary:      Effort: Pulmonary effort is normal. No respiratory distress.      Breath sounds: No wheezing or rales.   Musculoskeletal:      Right lower leg: Edema present.      Left lower leg: Edema present.            Assessment and Plan     Coronary artery disease/heart failure with preserved ejection fraction  -cardiac catheterization from 04/22-right   coronaries totally occluded in mid distally   She has a large dominant circumflex with possible severe narrowing in the proximal portion as well as diffuse intimal disease   She has 70% lad lesion with diffuse intimal disease the LAD   -continue medical management  -ranolazine 500, metoprolol 75, Imdur  -start  clopidogrel 75, and aspirin 81  -continue atorvastatin 40  -start Lasix 20 mg every other day with potassium chloride 10 mEq    COPD  -on 2-4 L of oxygen  - continue duo nebs and Incruse Ellipta    CKD  -creatinine is 2; EGFR 25    Healthcare maintenance  -flexible sigmoidoscopy 2019  -due for a Shingrix       No follow-ups on file.

## 2023-08-01 NOTE — LETTER
[address]  [place] [address]  Phone: [phone]   [phone]                        MD Nely Humphreys MD Chequita Williams, MD Matthew Bassett, PA-C Linda Melerine, NP Jodi Powell, NP Hunter Reed, PA-C      Date: 08/01/2023        Patient: Marisol Kerr  YOB: 1945      Please send the most recent Eye exam.        Sincerely,     Anabel Miles MA

## 2023-08-02 ENCOUNTER — DOCUMENT SCAN (OUTPATIENT)
Dept: HOME HEALTH SERVICES | Facility: HOSPITAL | Age: 78
End: 2023-08-02
Payer: MEDICARE

## 2023-08-02 ENCOUNTER — TELEPHONE (OUTPATIENT)
Dept: FAMILY MEDICINE | Facility: CLINIC | Age: 78
End: 2023-08-02
Payer: MEDICARE

## 2023-08-02 NOTE — PROGRESS NOTES
Discharge Information     Discharge Date:   7/23/23    Primary Discharge Diagnosis:  Acute CHF      Discharge Summary:  Reviewed      Medication & Order Review     Were medication changes made or new medications added?   Yes    If so, has the patient filled the prescriptions?  Yes     Was Home Health ordered? Pt already has HH    If so, has Home Health contacted patient and/or initiated services?  Already has HH    Name of Home Health Agency?  Already has HH    Durable Medical Equipment ordered?  No     If so, has the DME provider contacted patient and delivered equipment?  N/A    Follow Up               Any problems since discharge? No    How is the patient feeling since returning home?      Have you set up recommended follow up appointments?  They are calling to schedule appt with Cardiology.     Schedule Hospital Follow-up appointment within 7-14 days (preferably 7).      Notes:  Spoke with pt daughter, Marisol, pt reports doing well. Daughter checked on pt today. They are going to call the cardiologist tomorrow to schedule HFU.  Denies any complaints at this time. HFU scheduled.         Khadar Dwyer     SUBJECTIVE:    Patient ID: Marisol Kerr is a 78 y.o. female.    Chief Complaint: Hospital Follow Up (Brought bottles, discuss medication, requested Eye exam Dr Shukla, declined Dexa, abc)    Patient ID: Marisol Kerr is a 78 y.o. female.     Chief Complaint: Hospital Follow Up (Brought bottles, discuss medication, requested Eye exam Dr Shukla, declined Dexa, abc)     HPI  78-year-old with history of colon cancer diabetes COPD coronary artery disease who presents 3 days after discharge from a hospital stay.     Patient was admitted for chest pain, EKG showed ST abnormalities; troponins were  elevated at 68.  The angiography was performed but she did have a NM myocardial perfusion SPECT which showed ejection fraction 68 and normal left ventricular wall motion with absence of scintigraphic evidence for  myocardial ischemia.  This was most likely UA-NSTEMI     Today she denies chest pain and shortness at breath.  She is on 2 L of oxygen today.     She complains of bruising easily when she bumps into things.     Review of Systems   Respiratory:  Negative for shortness of breath.    Cardiovascular:  Negative for chest pain.   Gastrointestinal:  Positive for diarrhea. Negative for abdominal pain and constipation.   Hematological:  Bruises/bleeds easily.      Objective   Physical Exam  Cardiovascular:      Rate and Rhythm: Normal rate and regular rhythm.      Pulses: Normal pulses.      Heart sounds: Normal heart sounds.   Pulmonary:      Effort: Pulmonary effort is normal. No respiratory distress.      Breath sounds: No wheezing or rales.   Musculoskeletal:      Right lower leg: Edema present.      Left lower leg: Edema present.     Assessment and Plan   Coronary artery disease/heart failure with preserved ejection fraction  -cardiac catheterization from 04/22-right   coronaries totally occluded in mid distally   She has a large dominant circumflex with possible severe narrowing in the proximal portion as well as diffuse intimal disease   She has 70% lad lesion with diffuse intimal disease the LAD   -continue medical management  -ranolazine 500, metoprolol 75, Imdur  -start clopidogrel 75, and aspirin 81  -continue atorvastatin 40  -start Lasix 20 mg every other day with potassium chloride 10 mEq     COPD  -on 2-4 L of oxygen  - continue duo nebs and Incruse Ellipta     CKD  -creatinine is 2; EGFR 25     Healthcare maintenance  -flexible sigmoidoscopy 2019  -due for a Shingrix            Admit Date: 7/27/23   Discharge Date: 7/29/23    Patient follow up phone call documented on separate encounter.      Admission on 07/27/2023, Discharged on 07/29/2023   Component Date Value Ref Range Status    Magnesium 07/27/2023 2.1  1.6 - 2.6 mg/dL Final    WBC 07/27/2023 9.22  3.90 - 12.70 K/uL Final    RBC 07/27/2023 2.95 (L)   4.00 - 5.40 M/uL Final    Hemoglobin 07/27/2023 8.5 (L)  12.0 - 16.0 g/dL Final    Hematocrit 07/27/2023 28.2 (L)  37.0 - 48.5 % Final    MCV 07/27/2023 96  82 - 98 fL Final    MCH 07/27/2023 28.8  27.0 - 31.0 pg Final    MCHC 07/27/2023 30.1 (L)  32.0 - 36.0 g/dL Final    RDW 07/27/2023 15.3 (H)  11.5 - 14.5 % Final    Platelets 07/27/2023 204  150 - 450 K/uL Final    MPV 07/27/2023 11.7  9.2 - 12.9 fL Final    Immature Granulocytes 07/27/2023 0.4  0.0 - 0.5 % Final    Gran # (ANC) 07/27/2023 7.7  1.8 - 7.7 K/uL Final    Immature Grans (Abs) 07/27/2023 0.04  0.00 - 0.04 K/uL Final    Lymph # 07/27/2023 0.9 (L)  1.0 - 4.8 K/uL Final    Mono # 07/27/2023 0.6  0.3 - 1.0 K/uL Final    Eos # 07/27/2023 0.1  0.0 - 0.5 K/uL Final    Baso # 07/27/2023 0.05  0.00 - 0.20 K/uL Final    nRBC 07/27/2023 0  0 /100 WBC Final    Gran % 07/27/2023 83.0 (H)  38.0 - 73.0 % Final    Lymph % 07/27/2023 9.4 (L)  18.0 - 48.0 % Final    Mono % 07/27/2023 6.0  4.0 - 15.0 % Final    Eosinophil % 07/27/2023 0.7  0.0 - 8.0 % Final    Basophil % 07/27/2023 0.5  0.0 - 1.9 % Final    Differential Method 07/27/2023 Automated   Final    Sodium 07/27/2023 141  136 - 145 mmol/L Final    Potassium 07/27/2023 4.3  3.5 - 5.1 mmol/L Final    Chloride 07/27/2023 101  95 - 110 mmol/L Final    CO2 07/27/2023 30 (H)  23 - 29 mmol/L Final    Glucose 07/27/2023 144 (H)  70 - 110 mg/dL Final    BUN 07/27/2023 37 (H)  8 - 23 mg/dL Final    Creatinine 07/27/2023 2.0 (H)  0.5 - 1.4 mg/dL Final    Calcium 07/27/2023 8.9  8.7 - 10.5 mg/dL Final    Total Protein 07/27/2023 6.3  6.0 - 8.4 g/dL Final    Albumin 07/27/2023 3.2 (L)  3.5 - 5.2 g/dL Final    Total Bilirubin 07/27/2023 0.6  0.1 - 1.0 mg/dL Final    Alkaline Phosphatase 07/27/2023 181 (H)  55 - 135 U/L Final    AST 07/27/2023 22  10 - 40 U/L Final    ALT 07/27/2023 18  10 - 44 U/L Final    eGFR 07/27/2023 25.4 (A)  >60 mL/min/1.73 m^2 Final    Anion Gap 07/27/2023 10  8 - 16 mmol/L Final    Troponin I High  Sensitivity 07/27/2023 49.7 (H)  0.0 - 14.9 pg/mL Final    BNP 07/27/2023 195 (H)  0 - 99 pg/mL Final    Troponin I High Sensitivity 07/27/2023 52.7 (HH)  0.0 - 14.9 pg/mL Final    BSA 07/27/2023 1.75  m2 Final    TDI SEPTAL 07/27/2023 0.07  m/s Final    LV LATERAL E/E' RATIO 07/27/2023 15.17  m/s Final    LV SEPTAL E/E' RATIO 07/27/2023 13.00  m/s Final    IVC diameter 07/27/2023 0.99  cm Final    Left Ventricular Outflow Tract Duyen* 07/27/2023 0.65  cm/s Final    Left Ventricular Outflow Tract Duyen* 07/27/2023 2.00  mmHg Final    TDI LATERAL 07/27/2023 0.06  m/s Final    LVIDd 07/27/2023 4.69  3.5 - 6.0 cm Final    IVS 07/27/2023 0.82  0.6 - 1.1 cm Final    Posterior Wall 07/27/2023 0.86  0.6 - 1.1 cm Final    LVIDs 07/27/2023 3.13  2.1 - 4.0 cm Final    FS 07/27/2023 33  28 - 44 % Final    LV mass 07/27/2023 129.82  g Final    Left Ventricle Relative Wall Thick* 07/27/2023 0.37  cm Final    AV mean gradient 07/27/2023 5  mmHg Final    AV Velocity Ratio 07/27/2023 0.67   Final    AV index (prosthetic) 07/27/2023 0.62   Final    E/A ratio 07/27/2023 0.97   Final    Mean e' 07/27/2023 0.07  m/s Final    E wave deceleration time 07/27/2023 226.00  msec Final    LVOT peak tiki 07/27/2023 0.99  m/s Final    LVOT peak VTI 07/27/2023 22.60  cm Final    Ao peak tiki 07/27/2023 1.47  m/s Final    Ao VTI 07/27/2023 36.3  cm Final    AV peak gradient 07/27/2023 9  mmHg Final    E/E' ratio 07/27/2023 14.00  m/s Final    MV Peak E Tiki 07/27/2023 0.91  m/s Final    TR Max Tiki 07/27/2023 2.07  m/s Final    MV Peak A Tiki 07/27/2023 0.94  m/s Final    LV Systolic Volume 07/27/2023 27.60  mL Final    LV Systolic Volume Index 07/27/2023 16.0  mL/m2 Final    LV Diastolic Volume 07/27/2023 87.20  mL Final    LV Diastolic Volume Index 07/27/2023 50.40  mL/m2 Final    LV Mass Index 07/27/2023 75  g/m2 Final    Triscuspid Valve Regurgitation Pea* 07/27/2023 17  mmHg Final    Pandya's Biplane MOD Ejection Fra* 07/27/2023 7  % Final    Right  Atrial Pressure (from IVC) 07/27/2023 3  mmHg Final    EF 07/27/2023 60  % Final    TV resting pulmonary artery pressu* 07/27/2023 20  mmHg Final    Troponin I High Sensitivity 07/27/2023 57.4 (HH)  0.0 - 14.9 pg/mL Final    Troponin I High Sensitivity 07/27/2023 68.6 (HH)  0.0 - 14.9 pg/mL Final    Uric Acid 07/28/2023 8.6 (H)  2.4 - 5.7 mg/dL Final    Sodium 07/28/2023 142  136 - 145 mmol/L Final    Potassium 07/28/2023 4.2  3.5 - 5.1 mmol/L Final    Chloride 07/28/2023 104  95 - 110 mmol/L Final    CO2 07/28/2023 34 (H)  23 - 29 mmol/L Final    Glucose 07/28/2023 89  70 - 110 mg/dL Final    BUN 07/28/2023 33 (H)  8 - 23 mg/dL Final    Creatinine 07/28/2023 2.2 (H)  0.5 - 1.4 mg/dL Final    Calcium 07/28/2023 8.6 (L)  8.7 - 10.5 mg/dL Final    Anion Gap 07/28/2023 4 (L)  8 - 16 mmol/L Final    eGFR 07/28/2023 22.7 (A)  >60 mL/min/1.73 m^2 Final    Magnesium 07/28/2023 2.1  1.6 - 2.6 mg/dL Final    WBC 07/28/2023 7.50  3.90 - 12.70 K/uL Final    RBC 07/28/2023 2.91 (L)  4.00 - 5.40 M/uL Final    Hemoglobin 07/28/2023 8.4 (L)  12.0 - 16.0 g/dL Final    Hematocrit 07/28/2023 27.9 (L)  37.0 - 48.5 % Final    MCV 07/28/2023 96  82 - 98 fL Final    MCH 07/28/2023 28.9  27.0 - 31.0 pg Final    MCHC 07/28/2023 30.1 (L)  32.0 - 36.0 g/dL Final    RDW 07/28/2023 15.4 (H)  11.5 - 14.5 % Final    Platelets 07/28/2023 195  150 - 450 K/uL Final    MPV 07/28/2023 12.3  9.2 - 12.9 fL Final    Troponin I High Sensitivity 07/28/2023 35.5 (H)  0.0 - 14.9 pg/mL Final    RBC, UA 07/28/2023 2  0 - 4 /hpf Final    WBC, UA 07/28/2023 4  0 - 5 /hpf Final    Bacteria 07/28/2023 Negative  None-Occ /hpf Final    Squam Epithel, UA 07/28/2023 1  /hpf Final    Hyaline Casts, UA 07/28/2023 4 (A)  0-1/lpf /lpf Final    Microscopic Comment 07/28/2023 SEE COMMENT   Final    Specimen UA 07/28/2023 Urine, Clean Catch   Final    Color, UA 07/28/2023 Yellow  Yellow, Straw, Beverly Final    Appearance, UA 07/28/2023 Clear  Clear Final    pH, UA 07/28/2023  8.0  5.0 - 8.0 Final    Specific Gravity, UA 07/28/2023 1.015  1.005 - 1.030 Final    Protein, UA 07/28/2023 Trace (A)  Negative Final    Glucose, UA 07/28/2023 Negative  Negative Final    Ketones, UA 07/28/2023 Negative  Negative Final    Bilirubin (UA) 07/28/2023 Negative  Negative Final    Occult Blood UA 07/28/2023 Negative  Negative Final    Nitrite, UA 07/28/2023 Negative  Negative Final    Urobilinogen, UA 07/28/2023 Negative  Negative EU/dL Final    Leukocytes, UA 07/28/2023 1+ (A)  Negative Final    85% Max Predicted HR 07/29/2023 118   Final    Max Predicted HR 07/29/2023 139   Final    OHS CV CPX PATIENT IS MALE 07/29/2023 0.0   Final    OHS CV CPX PATIENT IS FEMALE 07/29/2023 1.0   Final    Systolic blood pressure 07/29/2023 146  mmHg Final    Diastolic blood pressure 07/29/2023 54  mmHg Final    HR at rest 07/29/2023 51  bpm Final    dose 07/29/2023 0.4  mg Final    Peak Systolic BP 07/29/2023 153  mmHg Final    Peak Diatolic BP 07/29/2023 58  mmHg Final    Peak HR 07/29/2023 68.0  bpm Final    % Max HR Achieved 07/29/2023 49   Final    1 Minute Recovery HR 07/29/2023 62  bpm Final    RPP 07/29/2023 7,446   Final    Peak RPP 07/29/2023 10,404   Final    Troponin I High Sensitivity 07/28/2023 36.7 (H)  0.0 - 14.9 pg/mL Final    Troponin I High Sensitivity 07/29/2023 35.6 (H)  0.0 - 14.9 pg/mL Final    Sodium 07/29/2023 138  136 - 145 mmol/L Final    Potassium 07/29/2023 4.0  3.5 - 5.1 mmol/L Final    Chloride 07/29/2023 100  95 - 110 mmol/L Final    CO2 07/29/2023 32 (H)  23 - 29 mmol/L Final    Glucose 07/29/2023 85  70 - 110 mg/dL Final    BUN 07/29/2023 35 (H)  8 - 23 mg/dL Final    Creatinine 07/29/2023 2.0 (H)  0.5 - 1.4 mg/dL Final    Calcium 07/29/2023 8.7  8.7 - 10.5 mg/dL Final    Anion Gap 07/29/2023 6 (L)  8 - 16 mmol/L Final    eGFR 07/29/2023 25.4 (A)  >60 mL/min/1.73 m^2 Final    Magnesium 07/29/2023 2.1  1.6 - 2.6 mg/dL Final    WBC 07/29/2023 6.71  3.90 - 12.70 K/uL Final    RBC  07/29/2023 3.44 (L)  4.00 - 5.40 M/uL Final    Hemoglobin 07/29/2023 9.8 (L)  12.0 - 16.0 g/dL Final    Hematocrit 07/29/2023 32.7 (L)  37.0 - 48.5 % Final    MCV 07/29/2023 95  82 - 98 fL Final    MCH 07/29/2023 28.5  27.0 - 31.0 pg Final    MCHC 07/29/2023 30.0 (L)  32.0 - 36.0 g/dL Final    RDW 07/29/2023 15.1 (H)  11.5 - 14.5 % Final    Platelets 07/29/2023 204  150 - 450 K/uL Final    MPV 07/29/2023 11.9  9.2 - 12.9 fL Final    Troponin I High Sensitivity 07/29/2023 27.2 (H)  0.0 - 14.9 pg/mL Final    Troponin I High Sensitivity 07/29/2023 25.4 (H)  0.0 - 14.9 pg/mL Final   Admission on 07/21/2023, Discharged on 07/23/2023   Component Date Value Ref Range Status    WBC 07/21/2023 7.72  3.90 - 12.70 K/uL Final    RBC 07/21/2023 2.96 (L)  4.00 - 5.40 M/uL Final    Hemoglobin 07/21/2023 8.6 (L)  12.0 - 16.0 g/dL Final    Hematocrit 07/21/2023 28.6 (L)  37.0 - 48.5 % Final    MCV 07/21/2023 97  82 - 98 fL Final    MCH 07/21/2023 29.1  27.0 - 31.0 pg Final    MCHC 07/21/2023 30.1 (L)  32.0 - 36.0 g/dL Final    RDW 07/21/2023 15.2 (H)  11.5 - 14.5 % Final    Platelets 07/21/2023 240  150 - 450 K/uL Final    MPV 07/21/2023 12.1  9.2 - 12.9 fL Final    Immature Granulocytes 07/21/2023 0.4  0.0 - 0.5 % Final    Gran # (ANC) 07/21/2023 5.7  1.8 - 7.7 K/uL Final    Immature Grans (Abs) 07/21/2023 0.03  0.00 - 0.04 K/uL Final    Lymph # 07/21/2023 1.3  1.0 - 4.8 K/uL Final    Mono # 07/21/2023 0.5  0.3 - 1.0 K/uL Final    Eos # 07/21/2023 0.2  0.0 - 0.5 K/uL Final    Baso # 07/21/2023 0.05  0.00 - 0.20 K/uL Final    nRBC 07/21/2023 0  0 /100 WBC Final    Gran % 07/21/2023 74.3 (H)  38.0 - 73.0 % Final    Lymph % 07/21/2023 16.5 (L)  18.0 - 48.0 % Final    Mono % 07/21/2023 6.3  4.0 - 15.0 % Final    Eosinophil % 07/21/2023 1.9  0.0 - 8.0 % Final    Basophil % 07/21/2023 0.6  0.0 - 1.9 % Final    Differential Method 07/21/2023 Automated   Final    Sodium 07/21/2023 139  136 - 145 mmol/L Final    Potassium 07/21/2023 4.7   3.5 - 5.1 mmol/L Final    Chloride 07/21/2023 104  95 - 110 mmol/L Final    CO2 07/21/2023 28  23 - 29 mmol/L Final    Glucose 07/21/2023 95  70 - 110 mg/dL Final    BUN 07/21/2023 24 (H)  8 - 23 mg/dL Final    Creatinine 07/21/2023 1.5 (H)  0.5 - 1.4 mg/dL Final    Calcium 07/21/2023 8.8  8.7 - 10.5 mg/dL Final    Total Protein 07/21/2023 6.3  6.0 - 8.4 g/dL Final    Albumin 07/21/2023 3.0 (L)  3.5 - 5.2 g/dL Final    Total Bilirubin 07/21/2023 0.6  0.1 - 1.0 mg/dL Final    Alkaline Phosphatase 07/21/2023 201 (H)  55 - 135 U/L Final    AST 07/21/2023 28  10 - 40 U/L Final    ALT 07/21/2023 37  10 - 44 U/L Final    eGFR 07/21/2023 35.9 (A)  >60 mL/min/1.73 m^2 Final    Anion Gap 07/21/2023 7 (L)  8 - 16 mmol/L Final    BNP 07/21/2023 341 (H)  0 - 99 pg/mL Final    Troponin I High Sensitivity 07/21/2023 12.3  0.0 - 14.9 pg/mL Final    Prothrombin Time 07/21/2023 10.3  9.0 - 12.5 sec Final    INR 07/21/2023 0.9  0.8 - 1.2 Final    Troponin I High Sensitivity 07/21/2023 11.1  0.0 - 14.9 pg/mL Final    Specimen UA 07/21/2023 Urine, Clean Catch   Final    Color, UA 07/21/2023 Yellow  Yellow, Straw, Beverly Final    Appearance, UA 07/21/2023 Clear  Clear Final    pH, UA 07/21/2023 6.0  5.0 - 8.0 Final    Specific Gravity, UA 07/21/2023 1.005  1.005 - 1.030 Final    Protein, UA 07/21/2023 Negative  Negative Final    Glucose, UA 07/21/2023 Negative  Negative Final    Ketones, UA 07/21/2023 Negative  Negative Final    Bilirubin (UA) 07/21/2023 Negative  Negative Final    Occult Blood UA 07/21/2023 Negative  Negative Final    Nitrite, UA 07/21/2023 Negative  Negative Final    Urobilinogen, UA 07/21/2023 Negative  Negative EU/dL Final    Leukocytes, UA 07/21/2023 Negative  Negative Final    Ferritin 07/21/2023 102  20.0 - 300.0 ng/mL Final    Iron 07/21/2023 47  30 - 160 ug/dL Final    Transferrin 07/21/2023 182 (L)  200 - 375 mg/dL Final    TIBC 07/21/2023 255  250 - 450 ug/dL Final    Saturated Iron 07/21/2023 18 (L)  20 - 50  % Final    POC PH 07/22/2023 7.398  7.35 - 7.45 Final    POC PCO2 07/22/2023 55.9 (H)  35 - 45 mmHg Final    POC PO2 07/22/2023 51  40 - 60 mmHg Final    POC HCO3 07/22/2023 34.5 (H)  24 - 28 mmol/L Final    POC BE 07/22/2023 10  -2 to 2 mmol/L Final    POC SATURATED O2 07/22/2023 85 (L)  95 - 100 % Final    POC TCO2 07/22/2023 36 (H)  24 - 29 mmol/L Final    Sample 07/22/2023 VENOUS   Final    Site 07/22/2023 Olga/UAC   Final    Allens Test 07/22/2023 N/A   Final    DelSys 07/22/2023 Nasal Can   Final    Cholesterol 07/22/2023 96 (L)  120 - 199 mg/dL Final    Triglycerides 07/22/2023 101  30 - 150 mg/dL Final    HDL 07/22/2023 44  40 - 75 mg/dL Final    LDL Cholesterol 07/22/2023 31.8 (L)  63.0 - 159.0 mg/dL Final    HDL/Cholesterol Ratio 07/22/2023 45.8  20.0 - 50.0 % Final    Total Cholesterol/HDL Ratio 07/22/2023 2.2  2.0 - 5.0 Final    Non-HDL Cholesterol 07/22/2023 52  mg/dL Final    Sodium 07/23/2023 139  136 - 145 mmol/L Final    Potassium 07/23/2023 4.4  3.5 - 5.1 mmol/L Final    Chloride 07/23/2023 100  95 - 110 mmol/L Final    CO2 07/23/2023 34 (H)  23 - 29 mmol/L Final    Glucose 07/23/2023 93  70 - 110 mg/dL Final    BUN 07/23/2023 30 (H)  8 - 23 mg/dL Final    Creatinine 07/23/2023 1.7 (H)  0.5 - 1.4 mg/dL Final    Calcium 07/23/2023 8.3 (L)  8.7 - 10.5 mg/dL Final    Anion Gap 07/23/2023 5 (L)  8 - 16 mmol/L Final    eGFR 07/23/2023 30.9 (A)  >60 mL/min/1.73 m^2 Final    WBC 07/23/2023 6.87  3.90 - 12.70 K/uL Final    RBC 07/23/2023 2.77 (L)  4.00 - 5.40 M/uL Final    Hemoglobin 07/23/2023 8.0 (L)  12.0 - 16.0 g/dL Final    Hematocrit 07/23/2023 26.2 (L)  37.0 - 48.5 % Final    MCV 07/23/2023 95  82 - 98 fL Final    MCH 07/23/2023 28.9  27.0 - 31.0 pg Final    MCHC 07/23/2023 30.5 (L)  32.0 - 36.0 g/dL Final    RDW 07/23/2023 14.9 (H)  11.5 - 14.5 % Final    Platelets 07/23/2023 214  150 - 450 K/uL Final    MPV 07/23/2023 12.0  9.2 - 12.9 fL Final    Magnesium 07/23/2023 1.7  1.6 - 2.6 mg/dL Final    Lab Visit on 07/14/2023   Component Date Value Ref Range Status    WBC 07/14/2023 8.45  3.90 - 12.70 K/uL Final    RBC 07/14/2023 3.17 (L)  4.00 - 5.40 M/uL Final    Hemoglobin 07/14/2023 9.2 (L)  12.0 - 16.0 g/dL Final    Hematocrit 07/14/2023 30.4 (L)  37.0 - 48.5 % Final    MCV 07/14/2023 96  82 - 98 fL Final    MCH 07/14/2023 29.0  27.0 - 31.0 pg Final    MCHC 07/14/2023 30.3 (L)  32.0 - 36.0 g/dL Final    RDW 07/14/2023 15.8 (H)  11.5 - 14.5 % Final    Platelets 07/14/2023 217  150 - 450 K/uL Final    MPV 07/14/2023 12.8  9.2 - 12.9 fL Final    Immature Granulocytes 07/14/2023 0.4  0.0 - 0.5 % Final    Gran # (ANC) 07/14/2023 6.8  1.8 - 7.7 K/uL Final    Immature Grans (Abs) 07/14/2023 0.03  0.00 - 0.04 K/uL Final    Lymph # 07/14/2023 1.0  1.0 - 4.8 K/uL Final    Mono # 07/14/2023 0.5  0.3 - 1.0 K/uL Final    Eos # 07/14/2023 0.1  0.0 - 0.5 K/uL Final    Baso # 07/14/2023 0.04  0.00 - 0.20 K/uL Final    nRBC 07/14/2023 0  0 /100 WBC Final    Gran % 07/14/2023 80.5 (H)  38.0 - 73.0 % Final    Lymph % 07/14/2023 12.0 (L)  18.0 - 48.0 % Final    Mono % 07/14/2023 5.4  4.0 - 15.0 % Final    Eosinophil % 07/14/2023 1.2  0.0 - 8.0 % Final    Basophil % 07/14/2023 0.5  0.0 - 1.9 % Final    Differential Method 07/14/2023 Automated   Final    Sodium 07/14/2023 142  136 - 145 mmol/L Final    Potassium 07/14/2023 4.9  3.5 - 5.1 mmol/L Final    Chloride 07/14/2023 106  95 - 110 mmol/L Final    CO2 07/14/2023 26  23 - 29 mmol/L Final    Glucose 07/14/2023 80  70 - 110 mg/dL Final    BUN 07/14/2023 25 (H)  8 - 23 mg/dL Final    Creatinine 07/14/2023 1.7 (H)  0.5 - 1.4 mg/dL Final    Calcium 07/14/2023 9.0  8.7 - 10.5 mg/dL Final    Total Protein 07/14/2023 6.2  6.0 - 8.4 g/dL Final    Albumin 07/14/2023 2.7 (L)  3.5 - 5.2 g/dL Final    Total Bilirubin 07/14/2023 0.3  0.1 - 1.0 mg/dL Final    Alkaline Phosphatase 07/14/2023 78  55 - 135 U/L Final    AST 07/14/2023 21  10 - 40 U/L Final    ALT 07/14/2023 10  10 - 44 U/L  Final    eGFR 07/14/2023 30.9 (A)  >60 mL/min/1.73 m^2 Final    Anion Gap 07/14/2023 10  8 - 16 mmol/L Final    CRP 07/14/2023 10.7 (H)  0.0 - 8.2 mg/L Final    Sed Rate 07/14/2023 51 (H)  0 - 20 mm/Hr Final    Hemoglobin A1C 07/14/2023 4.6  4.0 - 5.6 % Final    Estimated Avg Glucose 07/14/2023 85  68 - 131 mg/dL Final   No results displayed because visit has over 200 results.      Lab Visit on 05/04/2023   Component Date Value Ref Range Status    WBC 05/04/2023 8.02  3.90 - 12.70 K/uL Final    RBC 05/04/2023 3.76 (L)  4.00 - 5.40 M/uL Final    Hemoglobin 05/04/2023 10.2 (L)  12.0 - 16.0 g/dL Final    Hematocrit 05/04/2023 33.9 (L)  37.0 - 48.5 % Final    MCV 05/04/2023 90  82 - 98 fL Final    MCH 05/04/2023 27.1  27.0 - 31.0 pg Final    MCHC 05/04/2023 30.1 (L)  32.0 - 36.0 g/dL Final    RDW 05/04/2023 15.8 (H)  11.5 - 14.5 % Final    Platelets 05/04/2023 240  150 - 450 K/uL Final    MPV 05/04/2023 11.8  9.2 - 12.9 fL Final    Immature Granulocytes 05/04/2023 0.4  0.0 - 0.5 % Final    Gran # (ANC) 05/04/2023 6.6  1.8 - 7.7 K/uL Final    Immature Grans (Abs) 05/04/2023 0.03  0.00 - 0.04 K/uL Final    Lymph # 05/04/2023 0.8 (L)  1.0 - 4.8 K/uL Final    Mono # 05/04/2023 0.5  0.3 - 1.0 K/uL Final    Eos # 05/04/2023 0.1  0.0 - 0.5 K/uL Final    Baso # 05/04/2023 0.04  0.00 - 0.20 K/uL Final    nRBC 05/04/2023 0  0 /100 WBC Final    Gran % 05/04/2023 82.5 (H)  38.0 - 73.0 % Final    Lymph % 05/04/2023 10.2 (L)  18.0 - 48.0 % Final    Mono % 05/04/2023 5.7  4.0 - 15.0 % Final    Eosinophil % 05/04/2023 0.7  0.0 - 8.0 % Final    Basophil % 05/04/2023 0.5  0.0 - 1.9 % Final    Differential Method 05/04/2023 Automated   Final    Sodium 05/04/2023 139  136 - 145 mmol/L Final    Potassium 05/04/2023 4.8  3.5 - 5.1 mmol/L Final    Chloride 05/04/2023 102  95 - 110 mmol/L Final    CO2 05/04/2023 24  23 - 29 mmol/L Final    Glucose 05/04/2023 120 (H)  70 - 110 mg/dL Final    BUN 05/04/2023 34 (H)  8 - 23 mg/dL Final     Creatinine 05/04/2023 2.2 (H)  0.5 - 1.4 mg/dL Final    Calcium 05/04/2023 9.0  8.7 - 10.5 mg/dL Final    Total Protein 05/04/2023 6.0  6.0 - 8.4 g/dL Final    Albumin 05/04/2023 2.8 (L)  3.5 - 5.2 g/dL Final    Total Bilirubin 05/04/2023 0.4  0.1 - 1.0 mg/dL Final    Alkaline Phosphatase 05/04/2023 111  55 - 135 U/L Final    AST 05/04/2023 13  10 - 40 U/L Final    ALT 05/04/2023 9 (L)  10 - 44 U/L Final    Anion Gap 05/04/2023 13  8 - 16 mmol/L Final    eGFR 05/04/2023 23 (A)  >60 mL/min/1.73 m^2 Final    CRP 05/04/2023 38.7 (H)  0.0 - 8.2 mg/L Final    Sed Rate 05/04/2023 61 (H)  0 - 20 mm/Hr Final    Hemoglobin A1C 05/04/2023 4.9  4.0 - 5.6 % Final    Estimated Avg Glucose 05/04/2023 94  68 - 131 mg/dL Final    Prealbumin 05/04/2023 17 (L)  20 - 43 mg/dL Final   Lab Visit on 04/06/2023   Component Date Value Ref Range Status    WBC 04/06/2023 10.23  3.90 - 12.70 K/uL Final    RBC 04/06/2023 4.07  4.00 - 5.40 M/uL Final    Hemoglobin 04/06/2023 11.0 (L)  12.0 - 16.0 g/dL Final    Hematocrit 04/06/2023 37.6  37.0 - 48.5 % Final    MCV 04/06/2023 92  82 - 98 fL Final    MCH 04/06/2023 27.0  27.0 - 31.0 pg Final    MCHC 04/06/2023 29.3 (L)  32.0 - 36.0 g/dL Final    RDW 04/06/2023 14.9 (H)  11.5 - 14.5 % Final    Platelets 04/06/2023 251  150 - 450 K/uL Final    MPV 04/06/2023 12.5  9.2 - 12.9 fL Final    Immature Granulocytes 04/06/2023 0.3  0.0 - 0.5 % Final    Gran # (ANC) 04/06/2023 8.9 (H)  1.8 - 7.7 K/uL Final    Immature Grans (Abs) 04/06/2023 0.03  0.00 - 0.04 K/uL Final    Lymph # 04/06/2023 0.8 (L)  1.0 - 4.8 K/uL Final    Mono # 04/06/2023 0.4  0.3 - 1.0 K/uL Final    Eos # 04/06/2023 0.1  0.0 - 0.5 K/uL Final    Baso # 04/06/2023 0.06  0.00 - 0.20 K/uL Final    nRBC 04/06/2023 0  0 /100 WBC Final    Gran % 04/06/2023 86.6 (H)  38.0 - 73.0 % Final    Lymph % 04/06/2023 8.2 (L)  18.0 - 48.0 % Final    Mono % 04/06/2023 3.5 (L)  4.0 - 15.0 % Final    Eosinophil % 04/06/2023 0.8  0.0 - 8.0 % Final    Basophil  % 04/06/2023 0.6  0.0 - 1.9 % Final    Differential Method 04/06/2023 Automated   Final    Sodium 04/06/2023 139  136 - 145 mmol/L Final    Potassium 04/06/2023 4.7  3.5 - 5.1 mmol/L Final    Chloride 04/06/2023 105  95 - 110 mmol/L Final    CO2 04/06/2023 26  23 - 29 mmol/L Final    Glucose 04/06/2023 144 (H)  70 - 110 mg/dL Final    BUN 04/06/2023 29 (H)  8 - 23 mg/dL Final    Creatinine 04/06/2023 1.9 (H)  0.5 - 1.4 mg/dL Final    Calcium 04/06/2023 9.0  8.7 - 10.5 mg/dL Final    Total Protein 04/06/2023 6.4  6.0 - 8.4 g/dL Final    Albumin 04/06/2023 2.7 (L)  3.5 - 5.2 g/dL Final    Total Bilirubin 04/06/2023 0.4  0.1 - 1.0 mg/dL Final    Alkaline Phosphatase 04/06/2023 79  55 - 135 U/L Final    AST 04/06/2023 13  10 - 40 U/L Final    ALT 04/06/2023 9 (L)  10 - 44 U/L Final    Anion Gap 04/06/2023 8  8 - 16 mmol/L Final    eGFR 04/06/2023 27 (A)  >60 mL/min/1.73 m^2 Final   No results displayed because visit has over 200 results.          Past Medical History:   Diagnosis Date    CAD (coronary artery disease)     Cancer     colon    CHF (congestive heart failure)     Colitis     Colon cancer     COPD (chronic obstructive pulmonary disease)     Decreased hearing     Diabetes mellitus     Diabetes mellitus, type 2     Hypercholesterolemia     Hypertension     Hypoxemia     Insomnia     Obesity     Osteoarthritis      Past Surgical History:   Procedure Laterality Date    ANGIOGRAM, CORONARY, WITH LEFT HEART CATHETERIZATION N/A 2/10/2022    Procedure: Angiogram, Coronary, with Left Heart Cath;  Surgeon: Cristian Benjamin MD;  Location: [facility] CATH/EP LAB;  Service: Cardiology;  Laterality: N/A;    CARDIAC CATHETERIZATION      CHOLECYSTECTOMY      COLECTOMY      CORONARY ANGIOPLASTY      CORONARY STENT PLACEMENT      ERCP N/A 1/16/2023    Procedure: ERCP (ENDOSCOPIC RETROGRADE CHOLANGIOPANCREATOGRAPHY);  Surgeon: Biju Kramer III, MD;  Location: [facility];  Service: Endoscopy;  Laterality: N/A;     ESOPHAGOGASTRODUODENOSCOPY N/A 1/29/2023    Procedure: EGD (ESOPHAGOGASTRODUODENOSCOPY);  Surgeon: Aarti Alvarez MD;  Location: [facility] ENDO;  Service: Endoscopy;  Laterality: N/A;    EXTERNAL EAR SURGERY      EYE SURGERY      FLEXIBLE SIGMOIDOSCOPY N/A 9/19/2019    Procedure: SIGMOIDOSCOPY, FLEXIBLE;  Surgeon: Biju Kramer III, MD;  Location: [facility] ENDO;  Service: Endoscopy;  Laterality: N/A;    HYSTERECTOMY      partial     Family History   Problem Relation Age of Onset    Febrile seizures Mother     Heart failure Father        Marital Status:   Alcohol History:  reports current alcohol use.  Tobacco History:  reports that she quit smoking about 17 years ago. Her smoking use included cigarettes. She started smoking about 59 years ago. She has a 150.1 pack-year smoking history. She has never used smokeless tobacco.  Drug History:  reports no history of drug use.    Review of patient's allergies indicates:   Allergen Reactions    Iodinated contrast media     Strawberries [strawberry] Hives and Itching       Current Outpatient Medications:     albuterol (VENTOLIN HFA) 90 mcg/actuation inhaler, Inhale 2 puffs into the lungs every 6 (six) hours as needed for Wheezing or Shortness of Breath. Rescue, Disp: 36 g, Rfl: 3    albuterol-ipratropium (DUO-NEB) 2.5 mg-0.5 mg/3 mL nebulizer solution, Take 3 mLs by nebulization every 4 (four) hours as needed for Wheezing or Shortness of Breath. Rescue, Disp: 120 each, Rfl: 6    allopurinoL (ZYLOPRIM) 100 MG tablet, Take 0.5 tablets (50 mg total) by mouth once daily., Disp: 45 tablet, Rfl: 1    clopidogreL (PLAVIX) 75 mg tablet, Take 1 tablet (75 mg total) by mouth once daily., Disp: 30 tablet, Rfl: 0    coenzyme Q10 100 mg capsule, Take 100 mg by mouth every morning., Disp: , Rfl:     ergocalciferol (VITAMIN D2) 50,000 unit Cap, Take 1 capsule (50,000 Units total) by mouth every 7 days., Disp: 12 capsule, Rfl: 1    ferrous sulfate 325 (65 FE) MG EC tablet, Take 325  mg by mouth once daily., Disp: , Rfl:     fish oil-omega-3 fatty acids 300-1,000 mg capsule, Take 2 capsules by mouth every morning., Disp: , Rfl:     ipratropium-albuteroL (COMBIVENT RESPIMAT)  mcg/actuation inhaler, Inhale 2 puffs into the lungs every 4 (four) hours as needed for Shortness of Breath. Rescue, Disp: 12 g, Rfl: 3    isosorbide mononitrate (IMDUR) 30 MG 24 hr tablet, Take 1 tablet (30 mg total) by mouth once daily., Disp: 30 tablet, Rfl: 0    ketoconazole (NIZORAL) 2 % cream, Apply 1 application  topically 2 (two) times daily., Disp: , Rfl:     magnesium oxide (MAG-OX) 400 mg (241.3 mg magnesium) tablet, Take 1 tablet by mouth 2 (two) times daily., Disp: , Rfl:     metoprolol tartrate 75 mg Tab, Take 1 tablet (75 mg total) by mouth 3 (three) times daily., Disp: 90 tablet, Rfl: 0    mupirocin (BACTROBAN) 2 % ointment, Apply topically 2 (two) times daily., Disp: 30 g, Rfl: 3    nitroGLYCERIN 0.4 MG/DOSE TL SPRY (NITROLINGUAL) 400 mcg/spray spray, Place 1 spray under the tongue every 5 (five) minutes as needed for Chest pain (max of 3 doses)., Disp: 4.9 g, Rfl: 1    pantoprazole (PROTONIX) 40 MG tablet, Take 1 tablet (40 mg total) by mouth once daily., Disp: 90 tablet, Rfl: 3    polycarbophil (FIBERCON) 625 mg tablet, Take 625 mg by mouth once daily., Disp: , Rfl:     potassium chloride (MICRO-K) 10 MEQ CpSR, Take 1 capsule (10 mEq total) by mouth every other day. (TAKE WITH LASIX/FUROSEMIDE), Disp: 15 capsule, Rfl: 0    ranolazine (RANEXA) 500 MG Tb12, Take 1 tablet (500 mg total) by mouth 2 (two) times daily., Disp: 180 tablet, Rfl: 1    triamcinolone acetonide 0.1% (KENALOG) 0.1 % cream, Apply 1 g topically 2 (two) times daily., Disp: , Rfl:     umeclidinium (INCRUSE ELLIPTA) 62.5 mcg/actuation inhalation capsule, Inhale 62.5 mcg into the lungs every morning. Controller, Disp: 90 each, Rfl: 3    vit C/E/Zn/coppr/lutein/zeaxan (PRESERVISION AREDS-2 ORAL), Take 1 tablet by mouth once daily., Disp:  , Rfl:     ALPRAZolam (XANAX) 0.25 MG tablet, Take 1 tablet (0.25 mg total) by mouth every evening., Disp: 90 tablet, Rfl: 1    atorvastatin (LIPITOR) 40 MG tablet, Take 1 tablet (40 mg total) by mouth once daily., Disp: 90 tablet, Rfl: 3    colchicine (COLCRYS) 0.6 mg tablet, Take 0.3 mg by mouth every other day., Disp: , Rfl:     fluticasone-salmeterol diskus inhaler 250-50 mcg, Inhale 1 puff into the lungs 2 (two) times a day., Disp: , Rfl:     furosemide (LASIX) 20 MG tablet, Take 1 tablet (20 mg total) by mouth every other day. TAKE WITH POTASSIUM, Disp: 30 tablet, Rfl: 4    VITAMIN C 500 MG tablet, Take 500 mg by mouth 2 (two) times daily., Disp: , Rfl:     Review of Systems      Objective:      Vitals:    08/01/23 1202   BP: 120/60   Pulse: 60     Physical Exam    Assessment:       1. Congestive heart failure with left ventricular diastolic dysfunction, acute on chronic    2. Coronary artery disease of native artery of native heart with stable angina pectoris    3. Diabetic peripheral neuropathy    4. Hearing loss, unspecified hearing loss type, unspecified laterality    5. Chronic obstructive pulmonary disease, unspecified COPD type    6. COPD exacerbation    7. Essential hypertension, benign    8. Hypercholesterolemia    9. Chronic diastolic congestive heart failure    10. NSTEMI (non-ST elevated myocardial infarction)    11. Unstable angina    12. Acute combined systolic and diastolic congestive heart failure    13. Chronic kidney disease, stage 4 (severe)    14. History of colon cancer    15. DM type 2, controlled, with complication    16. Primary osteoarthritis of both hips    17. Osteoporosis, post-menopausal    18. Impaired functional mobility and endurance    19. Localized edema    20. Hospital discharge follow-up         Plan:       Congestive heart failure with left ventricular diastolic dysfunction, acute on chronic  Patient diuresed well in the hospital.  She has no pitting edema today in the  lower extremities.  Will follow-up with cardiology later this week  Coronary artery disease of native artery of native heart with stable angina pectoris  -     furosemide (LASIX) 20 MG tablet; Take 1 tablet (20 mg total) by mouth every other day. TAKE WITH POTASSIUM  Dispense: 30 tablet; Refill: 4  Furosemide reduced to 20 mg every other day  Diabetic peripheral neuropathy    Hearing loss, unspecified hearing loss type, unspecified laterality    Chronic obstructive pulmonary disease, unspecified COPD type  O2 at home ranges between 2 L and 4 L nasal cannula  COPD exacerbation  Continue home health follow-up with PT and OT  Essential hypertension, benign    Hypercholesterolemia    Chronic diastolic congestive heart failure    NSTEMI (non-ST elevated myocardial infarction)  Now on Plavix and aspirin 81 mg daily.  Angiogram and stenting was not possible due to her chronic kidney disease.  GFR 22.7  Unstable angina    Acute combined systolic and diastolic congestive heart failure    Chronic kidney disease, stage 4 (severe)  GFR is 22.7 BUN 33 creatinine 2.2  History of colon cancer    DM type 2, controlled, with complication    Primary osteoarthritis of both hips    Osteoporosis, post-menopausal    Impaired functional mobility and endurance    Localized edema    Hospital discharge follow-up  Hospital medications reconciled home medication  Carotid stenosis, on the right ultrasound showed 50-69% right carotid stenosis, will treat medically for now.  Follow up in about 2 months (around 10/1/2023).

## 2023-08-03 ENCOUNTER — TELEPHONE (OUTPATIENT)
Dept: FAMILY MEDICINE | Facility: CLINIC | Age: 78
End: 2023-08-03
Payer: MEDICARE

## 2023-08-25 ENCOUNTER — HOSPITAL ENCOUNTER (INPATIENT)
Facility: HOSPITAL | Age: 78
LOS: 2 days | Discharge: ANOTHER HOSPITAL | DRG: 189 | End: 2023-08-28
Attending: STUDENT IN AN ORGANIZED HEALTH CARE EDUCATION/TRAINING PROGRAM | Admitting: INTERNAL MEDICINE
Payer: MEDICARE

## 2023-08-25 DIAGNOSIS — J96.90 RESPIRATORY FAILURE: ICD-10-CM

## 2023-08-25 DIAGNOSIS — R07.9 CHEST PAIN: ICD-10-CM

## 2023-08-25 DIAGNOSIS — R07.9 CHEST PAIN IN ADULT: ICD-10-CM

## 2023-08-25 DIAGNOSIS — I21.4 NSTEMI (NON-ST ELEVATED MYOCARDIAL INFARCTION): Primary | ICD-10-CM

## 2023-08-25 DIAGNOSIS — I25.10 ASCVD (ARTERIOSCLEROTIC CARDIOVASCULAR DISEASE): ICD-10-CM

## 2023-08-25 LAB
ALBUMIN SERPL BCP-MCNC: 3.1 G/DL (ref 3.5–5.2)
ALLENS TEST: ABNORMAL
ALP SERPL-CCNC: 97 U/L (ref 55–135)
ALT SERPL W/O P-5'-P-CCNC: 10 U/L (ref 10–44)
ANION GAP SERPL CALC-SCNC: 5 MMOL/L (ref 8–16)
AST SERPL-CCNC: 19 U/L (ref 10–40)
BASOPHILS # BLD AUTO: 0.02 K/UL (ref 0–0.2)
BASOPHILS NFR BLD: 0.2 % (ref 0–1.9)
BILIRUB SERPL-MCNC: 0.5 MG/DL (ref 0.1–1)
BNP SERPL-MCNC: 592 PG/ML (ref 0–99)
BUN SERPL-MCNC: 37 MG/DL (ref 8–23)
CALCIUM SERPL-MCNC: 8.3 MG/DL (ref 8.7–10.5)
CHLORIDE SERPL-SCNC: 104 MMOL/L (ref 95–110)
CO2 SERPL-SCNC: 29 MMOL/L (ref 23–29)
CREAT SERPL-MCNC: 1.8 MG/DL (ref 0.5–1.4)
DELSYS: ABNORMAL
DIFFERENTIAL METHOD: ABNORMAL
EOSINOPHIL # BLD AUTO: 0.2 K/UL (ref 0–0.5)
EOSINOPHIL NFR BLD: 1.9 % (ref 0–8)
EP: 6
ERYTHROCYTE [DISTWIDTH] IN BLOOD BY AUTOMATED COUNT: 14.9 % (ref 11.5–14.5)
ERYTHROCYTE [SEDIMENTATION RATE] IN BLOOD BY WESTERGREN METHOD: 20 MM/H
EST. GFR  (NO RACE VARIABLE): 28.8 ML/MIN/1.73 M^2
FIO2: 25
GLUCOSE SERPL-MCNC: 189 MG/DL (ref 70–110)
GLUCOSE SERPL-MCNC: 191 MG/DL (ref 70–110)
HCO3 UR-SCNC: 30.4 MMOL/L (ref 24–28)
HCT VFR BLD AUTO: 32.7 % (ref 37–48.5)
HCT VFR BLD CALC: 33 %PCV (ref 36–54)
HGB BLD-MCNC: 9.8 G/DL (ref 12–16)
IMM GRANULOCYTES # BLD AUTO: 0.03 K/UL (ref 0–0.04)
IMM GRANULOCYTES NFR BLD AUTO: 0.3 % (ref 0–0.5)
IP: 12
LYMPHOCYTES # BLD AUTO: 1.4 K/UL (ref 1–4.8)
LYMPHOCYTES NFR BLD: 16.7 % (ref 18–48)
MAGNESIUM SERPL-MCNC: 2 MG/DL (ref 1.6–2.6)
MCH RBC QN AUTO: 28.8 PG (ref 27–31)
MCHC RBC AUTO-ENTMCNC: 30 G/DL (ref 32–36)
MCV RBC AUTO: 96 FL (ref 82–98)
MIN VOL: 16.8
MODE: ABNORMAL
MONOCYTES # BLD AUTO: 0.6 K/UL (ref 0.3–1)
MONOCYTES NFR BLD: 6.7 % (ref 4–15)
NEUTROPHILS # BLD AUTO: 6.4 K/UL (ref 1.8–7.7)
NEUTROPHILS NFR BLD: 74.2 % (ref 38–73)
NRBC BLD-RTO: 0 /100 WBC
PCO2 BLDA: 65.5 MMHG (ref 35–45)
PH SMN: 7.28 [PH] (ref 7.35–7.45)
PLATELET # BLD AUTO: 194 K/UL (ref 150–450)
PMV BLD AUTO: 12.3 FL (ref 9.2–12.9)
PO2 BLDA: 31 MMHG (ref 40–60)
POC BE: 4 MMOL/L
POC IONIZED CALCIUM: 1.22 MMOL/L (ref 1.06–1.42)
POC SATURATED O2: 50 % (ref 95–100)
POC TCO2: 32 MMOL/L (ref 24–29)
POTASSIUM BLD-SCNC: 4.5 MMOL/L (ref 3.5–5.1)
POTASSIUM SERPL-SCNC: 4.3 MMOL/L (ref 3.5–5.1)
PROT SERPL-MCNC: 6.4 G/DL (ref 6–8.4)
RBC # BLD AUTO: 3.4 M/UL (ref 4–5.4)
SAMPLE: ABNORMAL
SITE: ABNORMAL
SODIUM BLD-SCNC: 142 MMOL/L (ref 136–145)
SODIUM SERPL-SCNC: 138 MMOL/L (ref 136–145)
SP02: 97
SPONT RATE: 26
TROPONIN I SERPL HS-MCNC: 10.1 PG/ML (ref 0–14.9)
WBC # BLD AUTO: 8.62 K/UL (ref 3.9–12.7)

## 2023-08-25 PROCEDURE — 84484 ASSAY OF TROPONIN QUANT: CPT | Performed by: STUDENT IN AN ORGANIZED HEALTH CARE EDUCATION/TRAINING PROGRAM

## 2023-08-25 PROCEDURE — 99900031 HC PATIENT EDUCATION (STAT)

## 2023-08-25 PROCEDURE — 25000242 PHARM REV CODE 250 ALT 637 W/ HCPCS

## 2023-08-25 PROCEDURE — 84295 ASSAY OF SERUM SODIUM: CPT

## 2023-08-25 PROCEDURE — 82330 ASSAY OF CALCIUM: CPT

## 2023-08-25 PROCEDURE — 93010 ELECTROCARDIOGRAM REPORT: CPT | Mod: ,,, | Performed by: SPECIALIST

## 2023-08-25 PROCEDURE — 83735 ASSAY OF MAGNESIUM: CPT | Performed by: STUDENT IN AN ORGANIZED HEALTH CARE EDUCATION/TRAINING PROGRAM

## 2023-08-25 PROCEDURE — 83880 ASSAY OF NATRIURETIC PEPTIDE: CPT | Performed by: STUDENT IN AN ORGANIZED HEALTH CARE EDUCATION/TRAINING PROGRAM

## 2023-08-25 PROCEDURE — 94799 UNLISTED PULMONARY SVC/PX: CPT

## 2023-08-25 PROCEDURE — 99291 CRITICAL CARE FIRST HOUR: CPT

## 2023-08-25 PROCEDURE — 93010 EKG 12-LEAD: ICD-10-PCS | Mod: ,,, | Performed by: SPECIALIST

## 2023-08-25 PROCEDURE — 93005 ELECTROCARDIOGRAM TRACING: CPT | Performed by: SPECIALIST

## 2023-08-25 PROCEDURE — 85025 COMPLETE CBC W/AUTO DIFF WBC: CPT | Mod: 91 | Performed by: STUDENT IN AN ORGANIZED HEALTH CARE EDUCATION/TRAINING PROGRAM

## 2023-08-25 PROCEDURE — 94640 AIRWAY INHALATION TREATMENT: CPT

## 2023-08-25 PROCEDURE — 63600175 PHARM REV CODE 636 W HCPCS: Performed by: STUDENT IN AN ORGANIZED HEALTH CARE EDUCATION/TRAINING PROGRAM

## 2023-08-25 PROCEDURE — 96375 TX/PRO/DX INJ NEW DRUG ADDON: CPT

## 2023-08-25 PROCEDURE — 82803 BLOOD GASES ANY COMBINATION: CPT

## 2023-08-25 PROCEDURE — 94761 N-INVAS EAR/PLS OXIMETRY MLT: CPT

## 2023-08-25 PROCEDURE — 80053 COMPREHEN METABOLIC PANEL: CPT | Mod: 91 | Performed by: STUDENT IN AN ORGANIZED HEALTH CARE EDUCATION/TRAINING PROGRAM

## 2023-08-25 PROCEDURE — 96366 THER/PROPH/DIAG IV INF ADDON: CPT

## 2023-08-25 PROCEDURE — 85014 HEMATOCRIT: CPT

## 2023-08-25 PROCEDURE — 84132 ASSAY OF SERUM POTASSIUM: CPT

## 2023-08-25 PROCEDURE — 96365 THER/PROPH/DIAG IV INF INIT: CPT

## 2023-08-25 PROCEDURE — 99292 CRITICAL CARE ADDL 30 MIN: CPT

## 2023-08-25 PROCEDURE — 99900035 HC TECH TIME PER 15 MIN (STAT)

## 2023-08-25 RX ORDER — IPRATROPIUM BROMIDE AND ALBUTEROL SULFATE 2.5; .5 MG/3ML; MG/3ML
SOLUTION RESPIRATORY (INHALATION)
Status: COMPLETED
Start: 2023-08-25 | End: 2023-08-28

## 2023-08-25 RX ORDER — FUROSEMIDE 10 MG/ML
40 INJECTION INTRAMUSCULAR; INTRAVENOUS
Status: COMPLETED | OUTPATIENT
Start: 2023-08-25 | End: 2023-08-25

## 2023-08-25 RX ORDER — METHYLPREDNISOLONE SOD SUCC 125 MG
125 VIAL (EA) INJECTION
Status: COMPLETED | OUTPATIENT
Start: 2023-08-25 | End: 2023-08-25

## 2023-08-25 RX ORDER — NITROGLYCERIN 20 MG/100ML
0-400 INJECTION INTRAVENOUS CONTINUOUS
Status: DISCONTINUED | OUTPATIENT
Start: 2023-08-25 | End: 2023-08-26

## 2023-08-25 RX ORDER — ASPIRIN 325 MG
325 TABLET ORAL
Status: DISCONTINUED | OUTPATIENT
Start: 2023-08-25 | End: 2023-08-26

## 2023-08-25 RX ADMIN — IPRATROPIUM BROMIDE AND ALBUTEROL SULFATE 6 ML: 2.5; .5 SOLUTION RESPIRATORY (INHALATION) at 10:08

## 2023-08-25 RX ADMIN — METHYLPREDNISOLONE SODIUM SUCCINATE 125 MG: 125 INJECTION, POWDER, FOR SOLUTION INTRAMUSCULAR; INTRAVENOUS at 10:08

## 2023-08-25 RX ADMIN — NITROGLYCERIN 80 MCG/MIN: 20 INJECTION INTRAVENOUS at 10:08

## 2023-08-25 RX ADMIN — FUROSEMIDE 40 MG: 10 INJECTION, SOLUTION INTRAMUSCULAR; INTRAVENOUS at 10:08

## 2023-08-26 ENCOUNTER — CLINICAL SUPPORT (OUTPATIENT)
Dept: CARDIOLOGY | Facility: HOSPITAL | Age: 78
DRG: 189 | End: 2023-08-26
Attending: SPECIALIST
Payer: MEDICARE

## 2023-08-26 PROBLEM — I16.1 HYPERTENSIVE EMERGENCY: Status: ACTIVE | Noted: 2023-08-26

## 2023-08-26 LAB
ALBUMIN SERPL BCP-MCNC: 2.7 G/DL (ref 3.5–5.2)
ALLENS TEST: ABNORMAL
ALP SERPL-CCNC: 72 U/L (ref 55–135)
ALT SERPL W/O P-5'-P-CCNC: 9 U/L (ref 10–44)
ANION GAP SERPL CALC-SCNC: 7 MMOL/L (ref 8–16)
AST SERPL-CCNC: 16 U/L (ref 10–40)
BASOPHILS # BLD AUTO: 0.02 K/UL (ref 0–0.2)
BASOPHILS NFR BLD: 0.2 % (ref 0–1.9)
BILIRUB SERPL-MCNC: 0.5 MG/DL (ref 0.1–1)
BSA FOR ECHO PROCEDURE: 1.74 M2
BUN SERPL-MCNC: 32 MG/DL (ref 8–23)
CALCIUM SERPL-MCNC: 8.4 MG/DL (ref 8.7–10.5)
CHLORIDE SERPL-SCNC: 100 MMOL/L (ref 95–110)
CO2 SERPL-SCNC: 29 MMOL/L (ref 23–29)
CREAT SERPL-MCNC: 1.5 MG/DL (ref 0.5–1.4)
CV ECHO LV RWT: 0.59 CM
DELSYS: ABNORMAL
DIFFERENTIAL METHOD: ABNORMAL
ECHO LV POSTERIOR WALL: 1.24 CM (ref 0.6–1.1)
EOSINOPHIL # BLD AUTO: 0 K/UL (ref 0–0.5)
EOSINOPHIL NFR BLD: 0 % (ref 0–8)
EP: 6
ERYTHROCYTE [DISTWIDTH] IN BLOOD BY AUTOMATED COUNT: 14.9 % (ref 11.5–14.5)
ERYTHROCYTE [SEDIMENTATION RATE] IN BLOOD BY WESTERGREN METHOD: 20 MM/H
EST. GFR  (NO RACE VARIABLE): 35.9 ML/MIN/1.73 M^2
FIO2: 25
FRACTIONAL SHORTENING: 30 % (ref 28–44)
GLUCOSE SERPL-MCNC: 165 MG/DL (ref 70–110)
GLUCOSE SERPL-MCNC: 177 MG/DL (ref 70–110)
HCO3 UR-SCNC: 30 MMOL/L (ref 24–28)
HCT VFR BLD AUTO: 30.5 % (ref 37–48.5)
HCT VFR BLD CALC: 29 %PCV (ref 36–54)
HGB BLD-MCNC: 9.3 G/DL (ref 12–16)
IMM GRANULOCYTES # BLD AUTO: 0.05 K/UL (ref 0–0.04)
IMM GRANULOCYTES NFR BLD AUTO: 0.6 % (ref 0–0.5)
INFLUENZA A, MOLECULAR: NEGATIVE
INFLUENZA B, MOLECULAR: NEGATIVE
INTERVENTRICULAR SEPTUM: 1.33 CM (ref 0.6–1.1)
IP: 12
LEFT INTERNAL DIMENSION IN SYSTOLE: 2.94 CM (ref 2.1–4)
LEFT VENTRICLE DIASTOLIC VOLUME INDEX: 44.92 ML/M2
LEFT VENTRICLE DIASTOLIC VOLUME: 79.5 ML
LEFT VENTRICLE MASS INDEX: 112 G/M2
LEFT VENTRICLE SYSTOLIC VOLUME INDEX: 18.8 ML/M2
LEFT VENTRICLE SYSTOLIC VOLUME: 33.3 ML
LEFT VENTRICULAR INTERNAL DIMENSION IN DIASTOLE: 4.22 CM (ref 3.5–6)
LEFT VENTRICULAR MASS: 198.53 G
LYMPHOCYTES # BLD AUTO: 0.4 K/UL (ref 1–4.8)
LYMPHOCYTES NFR BLD: 4.5 % (ref 18–48)
MAGNESIUM SERPL-MCNC: 1.7 MG/DL (ref 1.6–2.6)
MCH RBC QN AUTO: 28.9 PG (ref 27–31)
MCHC RBC AUTO-ENTMCNC: 30.5 G/DL (ref 32–36)
MCV RBC AUTO: 95 FL (ref 82–98)
MIN VOL: 11.9
MODE: ABNORMAL
MONOCYTES # BLD AUTO: 0.1 K/UL (ref 0.3–1)
MONOCYTES NFR BLD: 0.9 % (ref 4–15)
NEUTROPHILS # BLD AUTO: 8.1 K/UL (ref 1.8–7.7)
NEUTROPHILS NFR BLD: 93.8 % (ref 38–73)
NRBC BLD-RTO: 0 /100 WBC
OHS LV EJECTION FRACTION SIMPSONS BIPLANE MOD: 57 %
PCO2 BLDA: 56.7 MMHG (ref 35–45)
PH SMN: 7.33 [PH] (ref 7.35–7.45)
PISA TR MAX VEL: 2.12 M/S
PLATELET # BLD AUTO: 175 K/UL (ref 150–450)
PMV BLD AUTO: 12 FL (ref 9.2–12.9)
PO2 BLDA: 30 MMHG (ref 40–60)
POC BE: 4 MMOL/L
POC IONIZED CALCIUM: 1.2 MMOL/L (ref 1.06–1.42)
POC SATURATED O2: 50 % (ref 95–100)
POC TCO2: 32 MMOL/L (ref 24–29)
POTASSIUM BLD-SCNC: 4.5 MMOL/L (ref 3.5–5.1)
POTASSIUM SERPL-SCNC: 4.7 MMOL/L (ref 3.5–5.1)
PROT SERPL-MCNC: 5.4 G/DL (ref 6–8.4)
RA PRESSURE ESTIMATED: 3 MMHG
RBC # BLD AUTO: 3.22 M/UL (ref 4–5.4)
RV TB RVSP: 5 MMHG
SAMPLE: ABNORMAL
SARS-COV-2 RDRP RESP QL NAA+PROBE: NEGATIVE
SITE: ABNORMAL
SODIUM BLD-SCNC: 141 MMOL/L (ref 136–145)
SODIUM SERPL-SCNC: 136 MMOL/L (ref 136–145)
SP02: 98
SPECIMEN SOURCE: NORMAL
SPONT RATE: 25
TR MAX PG: 18 MMHG
TROPONIN I SERPL HS-MCNC: 18.4 PG/ML (ref 0–14.9)
TROPONIN I SERPL HS-MCNC: 27.4 PG/ML (ref 0–14.9)
TV REST PULMONARY ARTERY PRESSURE: 21 MMHG
WBC # BLD AUTO: 8.62 K/UL (ref 3.9–12.7)
Z-SCORE OF LEFT VENTRICULAR DIMENSION IN END DIASTOLE: -1.48
Z-SCORE OF LEFT VENTRICULAR DIMENSION IN END SYSTOLE: -0.23

## 2023-08-26 PROCEDURE — 94660 CPAP INITIATION&MGMT: CPT

## 2023-08-26 PROCEDURE — 94640 AIRWAY INHALATION TREATMENT: CPT

## 2023-08-26 PROCEDURE — 63600175 PHARM REV CODE 636 W HCPCS: Performed by: SPECIALIST

## 2023-08-26 PROCEDURE — 93010 ELECTROCARDIOGRAM REPORT: CPT | Mod: ,,, | Performed by: SPECIALIST

## 2023-08-26 PROCEDURE — 99222 PR INITIAL HOSPITAL CARE,LEVL II: ICD-10-PCS | Mod: ,,, | Performed by: SPECIALIST

## 2023-08-26 PROCEDURE — 25000242 PHARM REV CODE 250 ALT 637 W/ HCPCS: Performed by: INTERNAL MEDICINE

## 2023-08-26 PROCEDURE — 27000221 HC OXYGEN, UP TO 24 HOURS

## 2023-08-26 PROCEDURE — 87502 INFLUENZA DNA AMP PROBE: CPT | Performed by: INTERNAL MEDICINE

## 2023-08-26 PROCEDURE — 99900031 HC PATIENT EDUCATION (STAT)

## 2023-08-26 PROCEDURE — 85014 HEMATOCRIT: CPT

## 2023-08-26 PROCEDURE — 25000003 PHARM REV CODE 250: Performed by: INTERNAL MEDICINE

## 2023-08-26 PROCEDURE — 63600175 PHARM REV CODE 636 W HCPCS

## 2023-08-26 PROCEDURE — 93005 ELECTROCARDIOGRAM TRACING: CPT | Performed by: SPECIALIST

## 2023-08-26 PROCEDURE — 84484 ASSAY OF TROPONIN QUANT: CPT | Performed by: STUDENT IN AN ORGANIZED HEALTH CARE EDUCATION/TRAINING PROGRAM

## 2023-08-26 PROCEDURE — 93308 TTE F-UP OR LMTD: CPT

## 2023-08-26 PROCEDURE — 84484 ASSAY OF TROPONIN QUANT: CPT | Mod: 91 | Performed by: INTERNAL MEDICINE

## 2023-08-26 PROCEDURE — 84295 ASSAY OF SERUM SODIUM: CPT

## 2023-08-26 PROCEDURE — 82803 BLOOD GASES ANY COMBINATION: CPT

## 2023-08-26 PROCEDURE — 99900035 HC TECH TIME PER 15 MIN (STAT)

## 2023-08-26 PROCEDURE — 99222 1ST HOSP IP/OBS MODERATE 55: CPT | Mod: ,,, | Performed by: SPECIALIST

## 2023-08-26 PROCEDURE — U0002 COVID-19 LAB TEST NON-CDC: HCPCS | Performed by: INTERNAL MEDICINE

## 2023-08-26 PROCEDURE — 63600175 PHARM REV CODE 636 W HCPCS: Performed by: INTERNAL MEDICINE

## 2023-08-26 PROCEDURE — 94799 UNLISTED PULMONARY SVC/PX: CPT

## 2023-08-26 PROCEDURE — 85025 COMPLETE CBC W/AUTO DIFF WBC: CPT | Performed by: INTERNAL MEDICINE

## 2023-08-26 PROCEDURE — 25000242 PHARM REV CODE 250 ALT 637 W/ HCPCS

## 2023-08-26 PROCEDURE — 93308 ECHO (CUPID ONLY): ICD-10-PCS | Mod: 26,,, | Performed by: SPECIALIST

## 2023-08-26 PROCEDURE — 93010 EKG 12-LEAD: ICD-10-PCS | Mod: ,,, | Performed by: SPECIALIST

## 2023-08-26 PROCEDURE — 25000003 PHARM REV CODE 250: Performed by: SPECIALIST

## 2023-08-26 PROCEDURE — 93308 TTE F-UP OR LMTD: CPT | Mod: 26,,, | Performed by: SPECIALIST

## 2023-08-26 PROCEDURE — 20000000 HC ICU ROOM

## 2023-08-26 PROCEDURE — 94761 N-INVAS EAR/PLS OXIMETRY MLT: CPT

## 2023-08-26 PROCEDURE — 80053 COMPREHEN METABOLIC PANEL: CPT | Performed by: INTERNAL MEDICINE

## 2023-08-26 PROCEDURE — 82330 ASSAY OF CALCIUM: CPT

## 2023-08-26 PROCEDURE — 36415 COLL VENOUS BLD VENIPUNCTURE: CPT | Performed by: INTERNAL MEDICINE

## 2023-08-26 PROCEDURE — 83735 ASSAY OF MAGNESIUM: CPT | Performed by: INTERNAL MEDICINE

## 2023-08-26 PROCEDURE — 84132 ASSAY OF SERUM POTASSIUM: CPT

## 2023-08-26 RX ORDER — NITROGLYCERIN 0.4 MG/1
TABLET SUBLINGUAL
Status: COMPLETED
Start: 2023-08-26 | End: 2023-08-26

## 2023-08-26 RX ORDER — ALPRAZOLAM 0.25 MG/1
0.25 TABLET ORAL NIGHTLY
Status: DISCONTINUED | OUTPATIENT
Start: 2023-08-26 | End: 2023-08-29 | Stop reason: HOSPADM

## 2023-08-26 RX ORDER — RANOLAZINE 500 MG/1
500 TABLET, EXTENDED RELEASE ORAL 2 TIMES DAILY
Status: DISCONTINUED | OUTPATIENT
Start: 2023-08-26 | End: 2023-08-29 | Stop reason: HOSPADM

## 2023-08-26 RX ORDER — PANTOPRAZOLE SODIUM 40 MG/1
40 TABLET, DELAYED RELEASE ORAL
Status: DISCONTINUED | OUTPATIENT
Start: 2023-08-26 | End: 2023-08-29 | Stop reason: HOSPADM

## 2023-08-26 RX ORDER — IPRATROPIUM BROMIDE AND ALBUTEROL SULFATE 2.5; .5 MG/3ML; MG/3ML
3 SOLUTION RESPIRATORY (INHALATION)
Status: DISCONTINUED | OUTPATIENT
Start: 2023-08-26 | End: 2023-08-29 | Stop reason: HOSPADM

## 2023-08-26 RX ORDER — ATORVASTATIN CALCIUM 40 MG/1
40 TABLET, FILM COATED ORAL NIGHTLY
Status: DISCONTINUED | OUTPATIENT
Start: 2023-08-26 | End: 2023-08-29 | Stop reason: HOSPADM

## 2023-08-26 RX ORDER — SODIUM,POTASSIUM PHOSPHATES 280-250MG
2 POWDER IN PACKET (EA) ORAL
Status: DISCONTINUED | OUTPATIENT
Start: 2023-08-26 | End: 2023-08-29 | Stop reason: HOSPADM

## 2023-08-26 RX ORDER — ISOSORBIDE MONONITRATE 30 MG/1
30 TABLET, EXTENDED RELEASE ORAL DAILY
Status: DISCONTINUED | OUTPATIENT
Start: 2023-08-26 | End: 2023-08-27

## 2023-08-26 RX ORDER — IBUPROFEN 200 MG
16 TABLET ORAL
Status: DISCONTINUED | OUTPATIENT
Start: 2023-08-26 | End: 2023-08-29 | Stop reason: HOSPADM

## 2023-08-26 RX ORDER — FUROSEMIDE 40 MG/1
40 TABLET ORAL DAILY
Status: DISCONTINUED | OUTPATIENT
Start: 2023-08-27 | End: 2023-08-29 | Stop reason: HOSPADM

## 2023-08-26 RX ORDER — CLOPIDOGREL BISULFATE 75 MG/1
75 TABLET ORAL DAILY
Status: DISCONTINUED | OUTPATIENT
Start: 2023-08-26 | End: 2023-08-29 | Stop reason: HOSPADM

## 2023-08-26 RX ORDER — SODIUM CHLORIDE 0.9 % (FLUSH) 0.9 %
10 SYRINGE (ML) INJECTION EVERY 12 HOURS PRN
Status: DISCONTINUED | OUTPATIENT
Start: 2023-08-26 | End: 2023-08-29 | Stop reason: HOSPADM

## 2023-08-26 RX ORDER — ACETAMINOPHEN 325 MG/1
650 TABLET ORAL EVERY 4 HOURS PRN
Status: DISCONTINUED | OUTPATIENT
Start: 2023-08-26 | End: 2023-08-29 | Stop reason: HOSPADM

## 2023-08-26 RX ORDER — MORPHINE SULFATE 2 MG/ML
INJECTION, SOLUTION INTRAMUSCULAR; INTRAVENOUS
Status: COMPLETED
Start: 2023-08-26 | End: 2023-08-26

## 2023-08-26 RX ORDER — NITROGLYCERIN 0.4 MG/1
0.4 TABLET SUBLINGUAL ONCE
Status: COMPLETED | OUTPATIENT
Start: 2023-08-26 | End: 2023-08-26

## 2023-08-26 RX ORDER — ENOXAPARIN SODIUM 100 MG/ML
40 INJECTION SUBCUTANEOUS ONCE
Status: COMPLETED | OUTPATIENT
Start: 2023-08-26 | End: 2023-08-26

## 2023-08-26 RX ORDER — LANOLIN ALCOHOL/MO/W.PET/CERES
800 CREAM (GRAM) TOPICAL
Status: DISCONTINUED | OUTPATIENT
Start: 2023-08-26 | End: 2023-08-29 | Stop reason: HOSPADM

## 2023-08-26 RX ORDER — LISINOPRIL 20 MG/1
20 TABLET ORAL DAILY
Status: DISCONTINUED | OUTPATIENT
Start: 2023-08-26 | End: 2023-08-27

## 2023-08-26 RX ORDER — ENOXAPARIN SODIUM 100 MG/ML
1 INJECTION SUBCUTANEOUS EVERY 24 HOURS
Status: DISCONTINUED | OUTPATIENT
Start: 2023-08-27 | End: 2023-08-29 | Stop reason: HOSPADM

## 2023-08-26 RX ORDER — ASPIRIN 81 MG/1
81 TABLET ORAL DAILY
Status: DISCONTINUED | OUTPATIENT
Start: 2023-08-26 | End: 2023-08-29 | Stop reason: HOSPADM

## 2023-08-26 RX ORDER — METOPROLOL TARTRATE 25 MG/1
75 TABLET, FILM COATED ORAL 3 TIMES DAILY
Status: DISCONTINUED | OUTPATIENT
Start: 2023-08-26 | End: 2023-08-26

## 2023-08-26 RX ORDER — POLYETHYLENE GLYCOL 3350 17 G/17G
17 POWDER, FOR SOLUTION ORAL 2 TIMES DAILY PRN
Status: DISCONTINUED | OUTPATIENT
Start: 2023-08-26 | End: 2023-08-29 | Stop reason: HOSPADM

## 2023-08-26 RX ORDER — MUPIROCIN 20 MG/G
OINTMENT TOPICAL 2 TIMES DAILY
Status: DISCONTINUED | OUTPATIENT
Start: 2023-08-26 | End: 2023-08-29 | Stop reason: HOSPADM

## 2023-08-26 RX ORDER — IBUPROFEN 200 MG
24 TABLET ORAL
Status: DISCONTINUED | OUTPATIENT
Start: 2023-08-26 | End: 2023-08-29 | Stop reason: HOSPADM

## 2023-08-26 RX ORDER — METOPROLOL SUCCINATE 50 MG/1
50 TABLET, EXTENDED RELEASE ORAL 2 TIMES DAILY
Status: DISCONTINUED | OUTPATIENT
Start: 2023-08-26 | End: 2023-08-27

## 2023-08-26 RX ORDER — PREDNISONE 20 MG/1
40 TABLET ORAL DAILY
Status: DISCONTINUED | OUTPATIENT
Start: 2023-08-26 | End: 2023-08-29 | Stop reason: HOSPADM

## 2023-08-26 RX ORDER — NALOXONE HCL 0.4 MG/ML
0.02 VIAL (ML) INJECTION
Status: DISCONTINUED | OUTPATIENT
Start: 2023-08-26 | End: 2023-08-29 | Stop reason: HOSPADM

## 2023-08-26 RX ORDER — NITROGLYCERIN 0.4 MG/1
0.4 TABLET SUBLINGUAL EVERY 5 MIN PRN
Status: DISCONTINUED | OUTPATIENT
Start: 2023-08-26 | End: 2023-08-29 | Stop reason: HOSPADM

## 2023-08-26 RX ORDER — FUROSEMIDE 20 MG/1
20 TABLET ORAL DAILY
Status: DISCONTINUED | OUTPATIENT
Start: 2023-08-26 | End: 2023-08-26

## 2023-08-26 RX ORDER — ENOXAPARIN SODIUM 100 MG/ML
30 INJECTION SUBCUTANEOUS EVERY 24 HOURS
Status: DISCONTINUED | OUTPATIENT
Start: 2023-08-26 | End: 2023-08-26

## 2023-08-26 RX ORDER — GLUCAGON 1 MG
1 KIT INJECTION
Status: DISCONTINUED | OUTPATIENT
Start: 2023-08-26 | End: 2023-08-29 | Stop reason: HOSPADM

## 2023-08-26 RX ORDER — CLONIDINE HYDROCHLORIDE 0.1 MG/1
0.2 TABLET ORAL ONCE
Status: DISCONTINUED | OUTPATIENT
Start: 2023-08-26 | End: 2023-08-29 | Stop reason: HOSPADM

## 2023-08-26 RX ORDER — TALC
6 POWDER (GRAM) TOPICAL NIGHTLY PRN
Status: DISCONTINUED | OUTPATIENT
Start: 2023-08-26 | End: 2023-08-29 | Stop reason: HOSPADM

## 2023-08-26 RX ORDER — MORPHINE SULFATE 2 MG/ML
2 INJECTION, SOLUTION INTRAMUSCULAR; INTRAVENOUS EVERY 4 HOURS PRN
Status: DISCONTINUED | OUTPATIENT
Start: 2023-08-26 | End: 2023-08-29 | Stop reason: HOSPADM

## 2023-08-26 RX ORDER — NITROGLYCERIN 20 MG/100ML
INJECTION INTRAVENOUS
Status: COMPLETED
Start: 2023-08-26 | End: 2023-08-26

## 2023-08-26 RX ORDER — NITROGLYCERIN 20 MG/100ML
0-400 INJECTION INTRAVENOUS CONTINUOUS
Status: DISCONTINUED | OUTPATIENT
Start: 2023-08-26 | End: 2023-08-29 | Stop reason: HOSPADM

## 2023-08-26 RX ORDER — ONDANSETRON 2 MG/ML
4 INJECTION INTRAMUSCULAR; INTRAVENOUS EVERY 6 HOURS PRN
Status: DISCONTINUED | OUTPATIENT
Start: 2023-08-26 | End: 2023-08-29 | Stop reason: HOSPADM

## 2023-08-26 RX ADMIN — MORPHINE SULFATE 2 MG: 2 INJECTION, SOLUTION INTRAMUSCULAR; INTRAVENOUS at 10:08

## 2023-08-26 RX ADMIN — ENOXAPARIN SODIUM 30 MG: 30 INJECTION SUBCUTANEOUS at 05:08

## 2023-08-26 RX ADMIN — IPRATROPIUM BROMIDE AND ALBUTEROL SULFATE 3 ML: 2.5; .5 SOLUTION RESPIRATORY (INHALATION) at 07:08

## 2023-08-26 RX ADMIN — NITROGLYCERIN 10 MCG/MIN: 20 INJECTION INTRAVENOUS at 09:08

## 2023-08-26 RX ADMIN — NITROGLYCERIN 1 INCH: 20 OINTMENT TOPICAL at 02:08

## 2023-08-26 RX ADMIN — ALPRAZOLAM 0.25 MG: 0.25 TABLET ORAL at 08:08

## 2023-08-26 RX ADMIN — Medication 800 MG: at 02:08

## 2023-08-26 RX ADMIN — ONDANSETRON 4 MG: 2 INJECTION INTRAMUSCULAR; INTRAVENOUS at 09:08

## 2023-08-26 RX ADMIN — METOPROLOL SUCCINATE 50 MG: 50 TABLET, FILM COATED, EXTENDED RELEASE ORAL at 08:08

## 2023-08-26 RX ADMIN — NITROGLYCERIN 0.4 MG: 0.4 TABLET SUBLINGUAL at 09:08

## 2023-08-26 RX ADMIN — FUROSEMIDE 20 MG: 20 TABLET ORAL at 08:08

## 2023-08-26 RX ADMIN — ISOSORBIDE MONONITRATE 30 MG: 30 TABLET, EXTENDED RELEASE ORAL at 08:08

## 2023-08-26 RX ADMIN — IPRATROPIUM BROMIDE AND ALBUTEROL SULFATE 3 ML: 2.5; .5 SOLUTION RESPIRATORY (INHALATION) at 02:08

## 2023-08-26 RX ADMIN — ENOXAPARIN SODIUM 40 MG: 40 INJECTION SUBCUTANEOUS at 10:08

## 2023-08-26 RX ADMIN — PANTOPRAZOLE SODIUM 40 MG: 40 TABLET, DELAYED RELEASE ORAL at 07:08

## 2023-08-26 RX ADMIN — ASPIRIN 81 MG: 81 TABLET, COATED ORAL at 02:08

## 2023-08-26 RX ADMIN — CLOPIDOGREL BISULFATE 75 MG: 75 TABLET, FILM COATED ORAL at 08:08

## 2023-08-26 RX ADMIN — ATORVASTATIN CALCIUM 40 MG: 40 TABLET, FILM COATED ORAL at 08:08

## 2023-08-26 RX ADMIN — LISINOPRIL 20 MG: 20 TABLET ORAL at 04:08

## 2023-08-26 RX ADMIN — RANOLAZINE 500 MG: 500 TABLET, EXTENDED RELEASE ORAL at 08:08

## 2023-08-26 RX ADMIN — Medication 800 MG: at 08:08

## 2023-08-26 RX ADMIN — MUPIROCIN 1 G: 20 OINTMENT TOPICAL at 08:08

## 2023-08-26 RX ADMIN — METOPROLOL TARTRATE 75 MG: 25 TABLET, FILM COATED ORAL at 08:08

## 2023-08-26 RX ADMIN — PREDNISONE 40 MG: 20 TABLET ORAL at 08:08

## 2023-08-26 RX ADMIN — NITROGLYCERIN 1 INCH: 20 OINTMENT TOPICAL at 09:08

## 2023-08-26 NOTE — SUBJECTIVE & OBJECTIVE
Past Medical History:   Diagnosis Date    CAD (coronary artery disease)     Cancer     colon    CHF (congestive heart failure)     Colitis     Colon cancer     COPD (chronic obstructive pulmonary disease)     Decreased hearing     Diabetes mellitus     Diabetes mellitus, type 2     Hypercholesterolemia     Hypertension     Hypoxemia     Insomnia     Obesity     Osteoarthritis        Past Surgical History:   Procedure Laterality Date    ANGIOGRAM, CORONARY, WITH LEFT HEART CATHETERIZATION N/A 2/10/2022    Procedure: Angiogram, Coronary, with Left Heart Cath;  Surgeon: Cristian Benjamin MD;  Location: [facility] CATH/EP LAB;  Service: Cardiology;  Laterality: N/A;    CARDIAC CATHETERIZATION      CHOLECYSTECTOMY      COLECTOMY      CORONARY ANGIOPLASTY      CORONARY STENT PLACEMENT      ERCP N/A 1/16/2023    Procedure: ERCP (ENDOSCOPIC RETROGRADE CHOLANGIOPANCREATOGRAPHY);  Surgeon: Biju Kramer III, MD;  Location: [facility] ENDO;  Service: Endoscopy;  Laterality: N/A;    ESOPHAGOGASTRODUODENOSCOPY N/A 1/29/2023    Procedure: EGD (ESOPHAGOGASTRODUODENOSCOPY);  Surgeon: Aarti Alvarez MD;  Location: [facility] ENDO;  Service: Endoscopy;  Laterality: N/A;    EXTERNAL EAR SURGERY      EYE SURGERY      FLEXIBLE SIGMOIDOSCOPY N/A 9/19/2019    Procedure: SIGMOIDOSCOPY, FLEXIBLE;  Surgeon: Biju Kramer III, MD;  Location: [facility] ENDO;  Service: Endoscopy;  Laterality: N/A;    HYSTERECTOMY      partial       Review of patient's allergies indicates:   Allergen Reactions    Iodinated contrast media     Strawberries [strawberry] Hives and Itching       No current facility-administered medications on file prior to encounter.     Current Outpatient Medications on File Prior to Encounter   Medication Sig    albuterol (VENTOLIN HFA) 90 mcg/actuation inhaler Inhale 2 puffs into the lungs every 6 (six) hours as needed for Wheezing or Shortness of Breath. Rescue    albuterol-ipratropium (DUO-NEB) 2.5 mg-0.5 mg/3 mL nebulizer solution  Take 3 mLs by nebulization every 4 (four) hours as needed for Wheezing or Shortness of Breath. Rescue    allopurinoL (ZYLOPRIM) 100 MG tablet Take 0.5 tablets (50 mg total) by mouth once daily.    ALPRAZolam (XANAX) 0.25 MG tablet Take 1 tablet (0.25 mg total) by mouth every evening.    atorvastatin (LIPITOR) 40 MG tablet Take 1 tablet (40 mg total) by mouth once daily.    clopidogreL (PLAVIX) 75 mg tablet Take 1 tablet (75 mg total) by mouth once daily.    coenzyme Q10 100 mg capsule Take 100 mg by mouth every morning.    colchicine (COLCRYS) 0.6 mg tablet Take 0.3 mg by mouth every other day.    ergocalciferol (VITAMIN D2) 50,000 unit Cap Take 1 capsule (50,000 Units total) by mouth every 7 days.    ferrous sulfate 325 (65 FE) MG EC tablet Take 325 mg by mouth once daily.    fish oil-omega-3 fatty acids 300-1,000 mg capsule Take 2 capsules by mouth every morning.    fluticasone-salmeterol diskus inhaler 250-50 mcg Inhale 1 puff into the lungs 2 (two) times a day.    furosemide (LASIX) 20 MG tablet Take 1 tablet (20 mg total) by mouth every other day. TAKE WITH POTASSIUM    ipratropium-albuteroL (COMBIVENT RESPIMAT)  mcg/actuation inhaler Inhale 2 puffs into the lungs every 4 (four) hours as needed for Shortness of Breath. Rescue    isosorbide mononitrate (IMDUR) 30 MG 24 hr tablet Take 1 tablet (30 mg total) by mouth once daily.    ketoconazole (NIZORAL) 2 % cream Apply 1 application  topically 2 (two) times daily.    magnesium oxide (MAG-OX) 400 mg (241.3 mg magnesium) tablet Take 1 tablet by mouth 2 (two) times daily.    metoprolol tartrate 75 mg Tab Take 1 tablet (75 mg total) by mouth 3 (three) times daily.    mupirocin (BACTROBAN) 2 % ointment Apply topically 2 (two) times daily.    nitroGLYCERIN 0.4 MG/DOSE TL SPRY (NITROLINGUAL) 400 mcg/spray spray Place 1 spray under the tongue every 5 (five) minutes as needed for Chest pain (max of 3 doses).    pantoprazole (PROTONIX) 40 MG tablet Take 1 tablet (40  mg total) by mouth once daily.    polycarbophil (FIBERCON) 625 mg tablet Take 625 mg by mouth once daily.    potassium chloride (MICRO-K) 10 MEQ CpSR Take 1 capsule (10 mEq total) by mouth every other day. (TAKE WITH LASIX/FUROSEMIDE)    ranolazine (RANEXA) 500 MG Tb12 Take 1 tablet (500 mg total) by mouth 2 (two) times daily.    triamcinolone acetonide 0.1% (KENALOG) 0.1 % cream Apply 1 g topically 2 (two) times daily.    umeclidinium (INCRUSE ELLIPTA) 62.5 mcg/actuation inhalation capsule Inhale 62.5 mcg into the lungs every morning. Controller    vit C/E/Zn/coppr/lutein/zeaxan (PRESERVISION AREDS-2 ORAL) Take 1 tablet by mouth once daily.    VITAMIN C 500 MG tablet Take 500 mg by mouth 2 (two) times daily.    [DISCONTINUED] benazepriL (LOTENSIN) 40 MG tablet Take 1 tablet (40 mg total) by mouth once daily.    [DISCONTINUED] cimetidine (TAGAMET) 300 MG tablet Take 1 tablet (300 mg total) by mouth every 6 (six) hours. Take 1 tablet (300 mg total) by mouth starting at 6 PM on 2022 (the evening before your angiogram procedure). Then take 1 tablet at 12 AM, then take 1 tablet at 6 AM on .    [DISCONTINUED] lisinopriL 10 MG tablet Take 1 tablet (10 mg total) by mouth once daily. HOLD UNTIL OTHERWISE DIRECTED BY PRIMARY CARE PROVIDER    [DISCONTINUED] lovastatin (MEVACOR) 40 MG tablet Take 1 tablet (40 mg total) by mouth every other day.     Family History       Problem Relation (Age of Onset)    Febrile seizures Mother    Heart failure Father          Tobacco Use    Smoking status: Former     Current packs/day: 0.00     Average packs/day: 3.0 packs/day for 50.0 years (150.1 ttl pk-yrs)     Types: Cigarettes     Start date: 3/30/1964     Quit date: 2006     Years since quittin.5    Smokeless tobacco: Never    Tobacco comments:     ex smoker 15 years   Substance and Sexual Activity    Alcohol use: Yes    Drug use: No    Sexual activity: Never     Review of Systems    Constitutional: Negative.    HENT: Negative.     Eyes: Negative.    Respiratory:  Positive for shortness of breath.    Cardiovascular:  Positive for chest pain and leg swelling.   Gastrointestinal: Negative.    Endocrine: Negative.    Genitourinary: Negative.    Musculoskeletal: Negative.    Skin: Negative.    Allergic/Immunologic: Negative.    Neurological: Negative.    Hematological: Negative.    Psychiatric/Behavioral: Negative.       Objective:     Vital Signs (Most Recent):  Temp: 98.8 °F (37.1 °C) (08/26/23 0301)  Pulse: 76 (08/26/23 0400)  Resp: 13 (08/26/23 0400)  BP: (!) 176/79 (08/26/23 0448)  SpO2: 97 % (08/26/23 0400) Vital Signs (24h Range):  Temp:  [98.8 °F (37.1 °C)-99.3 °F (37.4 °C)] 98.8 °F (37.1 °C)  Pulse:  [73-92] 76  Resp:  [13-39] 13  SpO2:  [94 %-100 %] 97 %  BP: (139-216)/(62-97) 176/79     Weight: 73.4 kg (161 lb 13.1 oz)  Body mass index is 28.66 kg/m².     Physical Exam  Vitals and nursing note reviewed.   Constitutional:       General: She is not in acute distress.     Appearance: She is well-developed.   HENT:      Head: Normocephalic and atraumatic.   Eyes:      Pupils: Pupils are equal, round, and reactive to light.   Cardiovascular:      Rate and Rhythm: Regular rhythm.      Heart sounds: No murmur heard.     Comments: BLE edema  Pulmonary:      Breath sounds: No wheezing.      Comments: Bipap  Very distant breath sounds  Okay air movement  No wheezing  No crackles  Speaking in complete sentences while on bipap  Abdominal:      General: There is no distension.      Palpations: Abdomen is soft.      Tenderness: There is no abdominal tenderness. There is no guarding or rebound.   Musculoskeletal:         General: Normal range of motion.      Cervical back: Normal range of motion.   Skin:     Findings: Erythema present. No rash.      Comments: Erythema to bilateral ankles that appears consistent with chronic venous stasis dermatitis    Neurological:      Mental Status: She is alert and  oriented to person, place, and time.      Cranial Nerves: No cranial nerve deficit.      Sensory: No sensory deficit.              CRANIAL NERVES     CN III, IV, VI   Pupils are equal, round, and reactive to light.       Significant Labs: All pertinent labs within the past 24 hours have been reviewed.  ABGs:   Recent Labs   Lab 08/25/23 2227 08/26/23  0123   PH 7.275* 7.332*   PCO2 65.5* 56.7*   HCO3 30.4* 30.0*   POCSATURATED 50* 50*   BE 4 4   PO2 31* 30*     CBC:   Recent Labs   Lab 08/25/23  1142 08/25/23 2227 08/25/23  2301 08/26/23  0123   WBC 7.41  --  8.62  --    HGB 9.2*  --  9.8*  --    HCT 30.6* 33* 32.7* 29*     --  194  --      CMP:   Recent Labs   Lab 08/25/23  1142 08/25/23  2301    138   K 4.9 4.3    104   CO2 30* 29   GLU 87 191*   BUN 34* 37*   CREATININE 1.7* 1.8*   CALCIUM 8.9 8.3*   PROT 6.2 6.4   ALBUMIN 2.9* 3.1*   BILITOT 0.3 0.5   ALKPHOS 89 97   AST 14 19   ALT 9* 10   ANIONGAP 9 5*     Cardiac Markers:   Recent Labs   Lab 08/25/23  2301   *     Troponin:   Recent Labs   Lab 08/25/23 2301 08/26/23  0120   TROPONINIHS 10.1 18.4*       Significant Imaging: I have reviewed all pertinent imaging results/findings within the past 24 hours.  CXR: I have reviewed all pertinent results/findings within the past 24 hours and my personal findings are:  as per HPI  EKG: I have reviewed all pertinent results/findings within the past 24 hours and my personal findings are: as per HPI

## 2023-08-26 NOTE — HOSPITAL COURSE
77-year-old female history of multivessel CAD with stent on Plavix, very severe COPD, chronic diastolic HF, chronic hypoxemic respiratory failure on oxygen, obesity, CKD 3, history of cholangitis status post ERCP with sphincterotomy and bile duct stone removal, type 2 diabetes presents to the ED via EMS with chest pain and in respiratory distress and HTN urgency and later CP.    She was recently discharged 7/29/23 after eval for chest pain.  Stress myoview with no reversible ischemia at that time.  Brilinta switched to plavix due to possible Brilinta associated SOB.  ACEi/ARB and Lasix were held due to renal insufficiency as well.    She was treated for Pulmonary edema and also for possible COPD and her BP at admission was 216/97.   CXR with scattered interstitial markings .   She was started on Tridil gtt for her HTN emergency and SBP 140s and later improved , Later BP increased and CP recurrent and cardio reviewed and because of her pre existing difficult coronary pathology ( know for long time and following up with dr balderas for high risk PCI , she is not candidate for CABG) and eventually transferred to [facility] under care of dr Balderas and transferred in stable condition .    Her BP is stable and CP free at the time of DC .She got short course of steroid . Replaced cardizem with amlodipine and increased imdur to 120 daily . Aspirin added to plavix  and lasix

## 2023-08-26 NOTE — PLAN OF CARE
08/26/23 0709   Patient Assessment/Suction   Level of Consciousness (AVPU) responds to voice   Respiratory Effort Normal;Unlabored   Expansion/Accessory Muscles/Retractions no retractions;no use of accessory muscles   All Lung Fields Breath Sounds diminished   Rhythm/Pattern, Respiratory unlabored;pattern regular;depth regular   PRE-TX-O2   Device (Oxygen Therapy) BIPAP   $ Is the patient on Low Flow Oxygen? Yes   Oxygen Concentration (%) 25   SpO2 98 %   Pulse Oximetry Type Continuous   $ Pulse Oximetry - Multiple Charge Pulse Oximetry - Multiple   Pulse 81   Resp (!) 22   Aerosol Therapy   $ Aerosol Therapy Charges Aerosol Treatment   Daily Review of Necessity (SVN) completed   Respiratory Treatment Status (SVN) given   Treatment Route (SVN) in-line   Patient Position (SVN) semi-Solorzano's   Post Treatment Assessment (SVN) breath sounds unchanged   Signs of Intolerance (SVN) none   Breath Sounds Post-Respiratory Treatment   Post-treatment Heart Rate (beats/min) 89   Post-treatment Resp Rate (breaths/min) 24   Preset CPAP/BiPAP Settings   Mode Of Delivery BiPAP S/T   $ CPAP/BiPAP Daily Charge BiPAP/CPAP Daily   $ Initial CPAP/BiPAP Setup? No   $ Is patient using? Yes   Size of Mask Small/Medium   Sized Appropriately? Yes   Equipment Type V60   Ipap 12   EPAP (cm H2O) 6   Pressure Support (cm H2O) 6   Set Rate (Breaths/Min) 20   ITime (sec) 1   Rise Time (sec) 3   Patient CPAP/BiPAP Settings   CPAP/BIPAP ID 17   Timed Inspiration (Sec) 1   IPAP Rise Time (sec) 3   RR Total (Breaths/Min) 24   Tidal Volume (mL) 478   VE Minute Ventilation (L/min) 11.5 L/min   Peak Inspiratory Pressure (cm H2O) 12   TiTOT (%) 30   Total Leak (L/Min) 0   Patient Trigger - ST Mode Only (%) 57   CPAP/BiPAP Alarms   High Pressure (cm H2O) 40   Low Pressure (cm H2O) 5   Minute Ventilation (L/Min) 2   High RR (breaths/min) 20   Low RR (breaths/min) 8   Apnea (Sec) 20   Education   $ Education BiPAP;Bronchodilator;15 min

## 2023-08-26 NOTE — PROGRESS NOTES
[facility] Medicine  Progress Note    Patient Name: Marisol Kerr  MRN: [id number]  Patient Class: IP- Inpatient   Admission Date: 8/25/2023  Length of Stay: 0 days  Attending Physician: Elio Grant MD  Primary Care Provider: Khadar Dwyer MD        Subjective:     Principal Problem:<principal problem not specified>        HPI:  77-year-old female history of multivessel CAD with stent on Plavix, very severe COPD, chronic diastolic HF, chronic hypoxemic respiratory failure on oxygen, obesity, CKD 3, history of cholangitis status post ERCP with sphincterotomy and bile duct stone removal, type 2 diabetes presents to the ED via EMS with chest pain and in respiratory distress. She was recently discharged 7/29/23 after eval for chest pain.  Stress myoview with no reversible ischemia at that time.  Brilinta switched to plavix due to possible Brilinta associated SOB.  Per review of Cardiology notes, it also looks like ACEi/ARB and Lasix were held due to renal insufficiency and she was not discharged back on these. PCP restarted lasix 20mg every other day in hospital follow up visit. She tells me today is the first day she's had recurrent chest pain since discharge.  It was substernal, sharp. She took one NTG spray and was shaking the bottle near her open mouth when she accidentally administered a second dose.  She became very frightened about NTG poisoning, which caused her chest pain to become worse and thus she called 911 for advice. She reports no SOB until EMS arrived and then she developed severe SOB en route and had to be placed on bipap. Per ED MD, she arrived in respiratory distress. It wasn't clear if this was an exacerbation of her COPD or HF or both and thus she was given IV solumedrol and IV lasix. BP on arrival was 216/97. CXR with scattered interstitial markings but looks better to me than last CXR in July. EKG with NSR, No ST elevation. Troponin 10,  (last 195 7/27).  She  was ultimately started on Tridil gtt for her HTN emergency and SBP 140s at the time of my exam and respiratory state has settled and she is speaking in complete sentences on bipap. She reports compliance with her medication and is presently chest pain free.  She does note that her legs a little more swollen than usual and she thinks this started yesterday. She has put out 500cc in the pure wick cannister in response to the lasix given in the ED.      Overview/Hospital Course:  No CP or SOB   BP still elevated   She wants to go home . ISSA better. ECHO not reported   No peripheral edema . She lives alone with HH and daughter live near by        Interval History:     Review of Systems  Objective:     Vital Signs (Most Recent):  Temp: 98.4 °F (36.9 °C) (08/26/23 1230)  Pulse: 71 (08/26/23 1500)  Resp: 18 (08/26/23 1500)  BP: (!) 167/72 (08/26/23 1500)  SpO2: 99 % (08/26/23 1500) Vital Signs (24h Range):  Temp:  [98.4 °F (36.9 °C)-99.3 °F (37.4 °C)] 98.4 °F (36.9 °C)  Pulse:  [68-92] 71  Resp:  [13-39] 18  SpO2:  [94 %-100 %] 99 %  BP: (139-216)/(62-97) 167/72     Weight: 73.4 kg (161 lb 13.1 oz)  Body mass index is 28.66 kg/m².    Intake/Output Summary (Last 24 hours) at 8/26/2023 1519  Last data filed at 8/26/2023 1019  Gross per 24 hour   Intake 360 ml   Output 1100 ml   Net -740 ml         Physical Exam  Vitals and nursing note reviewed.   HENT:      Head: Normocephalic and atraumatic.   Eyes:      Conjunctiva/sclera: Conjunctivae normal.   Neck:      Vascular: No JVD.   Cardiovascular:      Heart sounds: Normal heart sounds.   Pulmonary:      Effort: Pulmonary effort is normal.      Breath sounds: Normal breath sounds.   Abdominal:      Palpations: Abdomen is soft.   Musculoskeletal:         General: Normal range of motion.   Skin:     General: Skin is warm.   Neurological:      General: No focal deficit present.      Mental Status: She is alert and oriented to person, place, and time.   Psychiatric:         Mood  and Affect: Mood normal.             Significant Labs: All pertinent labs within the past 24 hours have been reviewed.  CBC:   Recent Labs   Lab 08/25/23  1142 08/25/23  2227 08/25/23  2301 08/26/23  0123 08/26/23  0432   WBC 7.41  --  8.62  --  8.62   HGB 9.2*  --  9.8*  --  9.3*   HCT 30.6*   < > 32.7* 29* 30.5*     --  194  --  175    < > = values in this interval not displayed.     CMP:   Recent Labs   Lab 08/25/23  1142 08/25/23  2301 08/26/23  0432    138 136   K 4.9 4.3 4.7    104 100   CO2 30* 29 29   GLU 87 191* 165*   BUN 34* 37* 32*   CREATININE 1.7* 1.8* 1.5*   CALCIUM 8.9 8.3* 8.4*   PROT 6.2 6.4 5.4*   ALBUMIN 2.9* 3.1* 2.7*   BILITOT 0.3 0.5 0.5   ALKPHOS 89 97 72   AST 14 19 16   ALT 9* 10 9*   ANIONGAP 9 5* 7*     Cardiac Markers:   Recent Labs   Lab 08/25/23 2301   *       Significant Imaging: I have reviewed all pertinent imaging results/findings within the past 24 hours.      Assessment/Plan:      Active Hospital Problems    Diagnosis    Hypertensive emergency    Acute on chronic respiratory failure with hypoxia and hypercapnia    COPD exacerbation    Acute on chronic heart failure with preserved ejection fraction (HFpEF)    Chronic diastolic congestive heart failure    COPD/emphysema    Chronic kidney disease, stage 4 (severe)    CAD (coronary artery disease)    DM type 2, controlled, with complication    Essential hypertension, benign     Plan:     Wean oxygen . She  Is now on 2 L . She use 2 L at home   S/p IV lasix and continue PO 40 mg daily   PO steroid -For possible COPD exacerbation  Transfer out of ICU   Off BIPAP    EKG is non ischemic.   -ISS. Accuchecks.  Transfer out of ICU        VTE Risk Mitigation (From admission, onward)         Ordered     enoxaparin injection 30 mg  Daily         08/26/23 0236     IP VTE HIGH RISK PATIENT  Once         08/26/23 0236     Place sequential compression device  Until discontinued         08/26/23 0236                 Discharge Planning   LUZ:      Code Status: Full Code   Is the patient medically ready for discharge?:     Reason for patient still in hospital (select all that apply): Treatment  Discharge Plan A: Home Health            Critical care time spent on the evaluation and treatment of severe organ dysfunction, review of pertinent labs and imaging studies, discussions with consulting providers and discussions with patient/family:30 minutes.      Elio Grant MD  Department of Hospital Medicine   [facility]

## 2023-08-26 NOTE — HPI
77-year-old female history of multivessel CAD with stent on Plavix, very severe COPD, chronic diastolic HF, chronic hypoxemic respiratory failure on oxygen, obesity, CKD 3, history of cholangitis status post ERCP with sphincterotomy and bile duct stone removal, type 2 diabetes presents to the ED via EMS with chest pain and in respiratory distress. She was recently discharged 7/29/23 after eval for chest pain.  Stress myoview with no reversible ischemia at that time.  Brilinta switched to plavix due to possible Brilinta associated SOB.  Per review of Cardiology notes, it also looks like ACEi/ARB and Lasix were held due to renal insufficiency and she was not discharged back on these. PCP restarted lasix 20mg every other day in hospital follow up visit. She tells me today is the first day she's had recurrent chest pain since discharge.  It was substernal, sharp. She took one NTG spray and was shaking the bottle near her open mouth when she accidentally administered a second dose.  She became very frightened about NTG poisoning, which caused her chest pain to become worse and thus she called 911 for advice. She reports no SOB until EMS arrived and then she developed severe SOB en route and had to be placed on bipap. Per ED MD, she arrived in respiratory distress. It wasn't clear if this was an exacerbation of her COPD or HF or both and thus she was given IV solumedrol and IV lasix. BP on arrival was 216/97. CXR with scattered interstitial markings but looks better to me than last CXR in July. EKG with NSR, No ST elevation. Troponin 10,  (last 195 7/27).  She was ultimately started on Tridil gtt for her HTN emergency and SBP 140s at the time of my exam and respiratory state has settled and she is speaking in complete sentences on bipap. She reports compliance with her medication and is presently chest pain free.  She does note that her legs a little more swollen than usual and she thinks this started yesterday.  She has put out 500cc in the pure wick cannister in response to the lasix given in the ED.

## 2023-08-26 NOTE — PLAN OF CARE
[facility]  Initial Discharge Assessment       Primary Care Provider: Khadar Dwyer MD    Admission Diagnosis: Respiratory failure [J96.90]  Chest pain [R07.9]    Admission Date: 8/25/2023  Expected Discharge Date:     Pt at bedside to complete discharge assessment. Pt AAOx4s. Demographics, PCP, and insurance verified. Pt has [facility]  home health. No dialysis. Pt reports ability to complete ADLs with assistance from rolling walker, cane, wheelchair and oxygen . Pt verbalized plan to discharge home via personal transport with daughter . Pt has no other needs to be addressed at this time.             Payor: MEDICARE / Plan: MEDICARE PART A & B / Product Type: Government /     Extended Emergency Contact Information  Primary Emergency Contact: Marisol Kerr  Address: [address] [place], [place] [address] [address] [place]  Mobile Phone: [phone]  Relation: Daughter  Preferred language: English   needed? No    Discharge Plan A: Home Health  Discharge Plan B: Home Health      [address] [place]"[employer] [facility]  [address] [facility] [address]  Phone: [phone] Fax: [phone]    [facility] - [place], [place] - [address] [facility]  [address] [facility] [address]  Phone: [phone] Fax: [phone]      Initial Assessment (most recent)       Adult Discharge Assessment - 08/26/23 1314          Discharge Assessment    Assessment Type Discharge Planning Assessment     Confirmed/corrected address, phone number and insurance Yes     Confirmed Demographics Correct on Facesheet     Source of Information patient;health record     Reason For Admission Chest Pain     People in Home child(grady), adult     Facility Arrived From: Home     Do you expect to return to your current living situation? Yes     Do you have help at home or someone to help you manage your care at home? Yes     Who are your caregiver(s) and their  phone number(s)? Marisol Kerr (Daughter)   [phone] (Mobile)     Prior to hospitilization cognitive status: Alert/Oriented     Current cognitive status: Alert/Oriented     Walking or Climbing Stairs ambulation difficulty, requires equipment     Mobility Management Rolling Walker, Cane, wheelchair     Equipment Currently Used at Home walker, rolling;wheelchair;cane, straight;oxygen   [facility]    Readmission within 30 days? Yes     Patient currently being followed by outpatient case management? No     Do you currently have service(s) that help you manage your care at home? Yes     Name and Contact number of agency [facility]     Is the pt/caregiver preference to resume services with current agency Yes     Do you take prescription medications? Yes     Do you have prescription coverage? Yes     Coverage Medicare and Medicaid     Do you have any problems affording any of your prescribed medications? No     Is the patient taking medications as prescribed? yes     Who is going to help you get home at discharge? Marisol Kerr (Daughter)   [phone] (Mobile)     How do you get to doctors appointments? family or friend will provide     Are you on dialysis? No     Do you take coumadin? No     DME Needed Upon Discharge  none     Discharge Plan discussed with: Patient     Discharge Plan A Home Health     Discharge Plan B Home Health

## 2023-08-26 NOTE — PLAN OF CARE
Patient is AxOx4. NSR on monitor. Patient reported chest discomfort this morning while BP was 207/86. Patient reported resolution of discomfort after AM medications for hypertension given. Patient reported no other episodes of chest discomfort. Nitro paste applied to chest. Bipap use this am; now on 4L NC. Diet advanced to full liquid-tolerating well. Denies nausea. Purewick maintained-1100cc measured output. Magnesium replaced.     Plan of care reviewed with the patient and she verbalized understanding. All comments and concerns addressed. Bed locked in lowest position with bed alarm set, call light within reach. Safety precautions maintained.

## 2023-08-26 NOTE — RESPIRATORY THERAPY
08/26/23 0220   Patient Assessment/Suction   Level of Consciousness (AVPU) alert   Respiratory Effort Mild   PRE-TX-O2   Device (Oxygen Therapy) BIPAP   SpO2 100 %   Pulse Oximetry Type Continuous   $ Pulse Oximetry - Multiple Charge Pulse Oximetry - Multiple   Preset CPAP/BiPAP Settings   Mode Of Delivery BiPAP   $ Initial CPAP/BiPAP Setup? No   $ Is patient using? Yes   Size of Mask Small/Medium   Sized Appropriately? Yes   Equipment Type V60   Airway Device Type small full face mask   Humidifier not applicable   Ipap 12   EPAP (cm H2O) 6   Pressure Support (cm H2O) 6   Set Rate (Breaths/Min) 20   ITime (sec) 1   Rise Time (sec) 3   Patient CPAP/BiPAP Settings   Timed Inspiration (Sec) 1   IPAP Rise Time (sec) 3   RR Total (Breaths/Min) 25   Tidal Volume (mL) 491   VE Minute Ventilation (L/min) 12.4 L/min   Peak Inspiratory Pressure (cm H2O) 17   TiTOT (%) 28   Total Leak (L/Min) 6   Patient Trigger - ST Mode Only (%) 85   Education   $ Education BiPAP;15 min   Respiratory Evaluation   $ Care Plan Tech Time 15 min   $ Eval/Re-eval Charges Re-evaluation

## 2023-08-26 NOTE — H&P
Former[facility], [facility] Medicine  History & Physical    Patient Name: Marisol Kerr  MRN: [id number]  Patient Class: IP- Inpatient  Admission Date: 8/25/2023  Attending Physician: Tommie Mcmullen MD  Primary Care Provider: Khadar Dwyer MD         Patient information was obtained from patient, past medical records and ER records.     Subjective:     Principal Problem:<principal problem not specified>    Chief Complaint:   Chief Complaint   Patient presents with    Shortness of Breath     4L O2 at home, took 2 doses of Nitro at home, received duoneb with EMS    Chest Pain        HPI: 77-year-old female history of multivessel CAD with stent on Plavix, very severe COPD, chronic diastolic HF, chronic hypoxemic respiratory failure on oxygen, obesity, CKD 3, history of cholangitis status post ERCP with sphincterotomy and bile duct stone removal, type 2 diabetes presents to the ED via EMS with chest pain and in respiratory distress. She was recently discharged 7/29/23 after eval for chest pain.  Stress myoview with no reversible ischemia at that time.  Brilinta switched to plavix due to possible Brilinta associated SOB.  Per review of Cardiology notes, it also looks like ACEi/ARB and Lasix were held due to renal insufficiency and she was not discharged back on these. PCP restarted lasix 20mg every other day in hospital follow up visit. She tells me today is the first day she's had recurrent chest pain since discharge.  It was substernal, sharp. She took one NTG spray and was shaking the bottle near her open mouth when she accidentally administered a second dose.  She became very frightened about NTG poisoning, which caused her chest pain to become worse and thus she called 911 for advice. She reports no SOB until EMS arrived and then she developed severe SOB en route and had to be placed on bipap. Per ED MD, she arrived in respiratory distress. It wasn't clear if this was an exacerbation of her COPD or HF or  both and thus she was given IV solumedrol and IV lasix. BP on arrival was 216/97. CXR with scattered interstitial markings but looks better to me than last CXR in July. EKG with NSR, No ST elevation. Troponin 10,  (last 195 7/27).  She was ultimately started on Tridil gtt for her HTN emergency and SBP 140s at the time of my exam and respiratory state has settled and she is speaking in complete sentences on bipap. She reports compliance with her medication and is presently chest pain free.  She does note that her legs a little more swollen than usual and she thinks this started yesterday. She has put out 500cc in the pure wick cannister in response to the lasix given in the ED.      Past Medical History:   Diagnosis Date    CAD (coronary artery disease)     Cancer     colon    CHF (congestive heart failure)     Colitis     Colon cancer     COPD (chronic obstructive pulmonary disease)     Decreased hearing     Diabetes mellitus     Diabetes mellitus, type 2     Hypercholesterolemia     Hypertension     Hypoxemia     Insomnia     Obesity     Osteoarthritis        Past Surgical History:   Procedure Laterality Date    ANGIOGRAM, CORONARY, WITH LEFT HEART CATHETERIZATION N/A 2/10/2022    Procedure: Angiogram, Coronary, with Left Heart Cath;  Surgeon: Cristian Benjamin MD;  Location: [facility] CATH/EP LAB;  Service: Cardiology;  Laterality: N/A;    CARDIAC CATHETERIZATION      CHOLECYSTECTOMY      COLECTOMY      CORONARY ANGIOPLASTY      CORONARY STENT PLACEMENT      ERCP N/A 1/16/2023    Procedure: ERCP (ENDOSCOPIC RETROGRADE CHOLANGIOPANCREATOGRAPHY);  Surgeon: Biju Kramer III, MD;  Location: [facility];  Service: Endoscopy;  Laterality: N/A;    ESOPHAGOGASTRODUODENOSCOPY N/A 1/29/2023    Procedure: EGD (ESOPHAGOGASTRODUODENOSCOPY);  Surgeon: Aarti Alvarez MD;  Location: [facility] ENDO;  Service: Endoscopy;  Laterality: N/A;    EXTERNAL EAR SURGERY      EYE SURGERY      FLEXIBLE  SIGMOIDOSCOPY N/A 9/19/2019    Procedure: SIGMOIDOSCOPY, FLEXIBLE;  Surgeon: Biju Kramer III, MD;  Location: [facility];  Service: Endoscopy;  Laterality: N/A;    HYSTERECTOMY      partial       Review of patient's allergies indicates:   Allergen Reactions    Iodinated contrast media     Strawberries [strawberry] Hives and Itching       No current facility-administered medications on file prior to encounter.     Current Outpatient Medications on File Prior to Encounter   Medication Sig    albuterol (VENTOLIN HFA) 90 mcg/actuation inhaler Inhale 2 puffs into the lungs every 6 (six) hours as needed for Wheezing or Shortness of Breath. Rescue    albuterol-ipratropium (DUO-NEB) 2.5 mg-0.5 mg/3 mL nebulizer solution Take 3 mLs by nebulization every 4 (four) hours as needed for Wheezing or Shortness of Breath. Rescue    allopurinoL (ZYLOPRIM) 100 MG tablet Take 0.5 tablets (50 mg total) by mouth once daily.    ALPRAZolam (XANAX) 0.25 MG tablet Take 1 tablet (0.25 mg total) by mouth every evening.    atorvastatin (LIPITOR) 40 MG tablet Take 1 tablet (40 mg total) by mouth once daily.    clopidogreL (PLAVIX) 75 mg tablet Take 1 tablet (75 mg total) by mouth once daily.    coenzyme Q10 100 mg capsule Take 100 mg by mouth every morning.    colchicine (COLCRYS) 0.6 mg tablet Take 0.3 mg by mouth every other day.    ergocalciferol (VITAMIN D2) 50,000 unit Cap Take 1 capsule (50,000 Units total) by mouth every 7 days.    ferrous sulfate 325 (65 FE) MG EC tablet Take 325 mg by mouth once daily.    fish oil-omega-3 fatty acids 300-1,000 mg capsule Take 2 capsules by mouth every morning.    fluticasone-salmeterol diskus inhaler 250-50 mcg Inhale 1 puff into the lungs 2 (two) times a day.    furosemide (LASIX) 20 MG tablet Take 1 tablet (20 mg total) by mouth every other day. TAKE WITH POTASSIUM    ipratropium-albuteroL (COMBIVENT RESPIMAT)  mcg/actuation inhaler Inhale 2 puffs into the lungs every 4  (four) hours as needed for Shortness of Breath. Rescue    isosorbide mononitrate (IMDUR) 30 MG 24 hr tablet Take 1 tablet (30 mg total) by mouth once daily.    ketoconazole (NIZORAL) 2 % cream Apply 1 application  topically 2 (two) times daily.    magnesium oxide (MAG-OX) 400 mg (241.3 mg magnesium) tablet Take 1 tablet by mouth 2 (two) times daily.    metoprolol tartrate 75 mg Tab Take 1 tablet (75 mg total) by mouth 3 (three) times daily.    mupirocin (BACTROBAN) 2 % ointment Apply topically 2 (two) times daily.    nitroGLYCERIN 0.4 MG/DOSE TL SPRY (NITROLINGUAL) 400 mcg/spray spray Place 1 spray under the tongue every 5 (five) minutes as needed for Chest pain (max of 3 doses).    pantoprazole (PROTONIX) 40 MG tablet Take 1 tablet (40 mg total) by mouth once daily.    polycarbophil (FIBERCON) 625 mg tablet Take 625 mg by mouth once daily.    potassium chloride (MICRO-K) 10 MEQ CpSR Take 1 capsule (10 mEq total) by mouth every other day. (TAKE WITH LASIX/FUROSEMIDE)    ranolazine (RANEXA) 500 MG Tb12 Take 1 tablet (500 mg total) by mouth 2 (two) times daily.    triamcinolone acetonide 0.1% (KENALOG) 0.1 % cream Apply 1 g topically 2 (two) times daily.    umeclidinium (INCRUSE ELLIPTA) 62.5 mcg/actuation inhalation capsule Inhale 62.5 mcg into the lungs every morning. Controller    vit C/E/Zn/coppr/lutein/zeaxan (PRESERVISION AREDS-2 ORAL) Take 1 tablet by mouth once daily.    VITAMIN C 500 MG tablet Take 500 mg by mouth 2 (two) times daily.    [DISCONTINUED] benazepriL (LOTENSIN) 40 MG tablet Take 1 tablet (40 mg total) by mouth once daily.    [DISCONTINUED] cimetidine (TAGAMET) 300 MG tablet Take 1 tablet (300 mg total) by mouth every 6 (six) hours. Take 1 tablet (300 mg total) by mouth starting at 6 PM on Sunday April 17, 2022 (the evening before your angiogram procedure). Then take 1 tablet at 12 AM, then take 1 tablet at 6 AM on Monday April 18th.    [DISCONTINUED] lisinopriL 10 MG tablet Take  1 tablet (10 mg total) by mouth once daily. HOLD UNTIL OTHERWISE DIRECTED BY PRIMARY CARE PROVIDER    [DISCONTINUED] lovastatin (MEVACOR) 40 MG tablet Take 1 tablet (40 mg total) by mouth every other day.     Family History       Problem Relation (Age of Onset)    Febrile seizures Mother    Heart failure Father          Tobacco Use    Smoking status: Former     Current packs/day: 0.00     Average packs/day: 3.0 packs/day for 50.0 years (150.1 ttl pk-yrs)     Types: Cigarettes     Start date: 3/30/1964     Quit date: 2006     Years since quittin.5    Smokeless tobacco: Never    Tobacco comments:     ex smoker 15 years   Substance and Sexual Activity    Alcohol use: Yes    Drug use: No    Sexual activity: Never     Review of Systems   Constitutional: Negative.    HENT: Negative.     Eyes: Negative.    Respiratory:  Positive for shortness of breath.    Cardiovascular:  Positive for chest pain and leg swelling.   Gastrointestinal: Negative.    Endocrine: Negative.    Genitourinary: Negative.    Musculoskeletal: Negative.    Skin: Negative.    Allergic/Immunologic: Negative.    Neurological: Negative.    Hematological: Negative.    Psychiatric/Behavioral: Negative.       Objective:     Vital Signs (Most Recent):  Temp: 98.8 °F (37.1 °C) (23 0301)  Pulse: 76 (23 0400)  Resp: 13 (23 0400)  BP: (!) 176/79 (23 0448)  SpO2: 97 % (23 0400) Vital Signs (24h Range):  Temp:  [98.8 °F (37.1 °C)-99.3 °F (37.4 °C)] 98.8 °F (37.1 °C)  Pulse:  [73-92] 76  Resp:  [13-39] 13  SpO2:  [94 %-100 %] 97 %  BP: (139-216)/(62-97) 176/79     Weight: 73.4 kg (161 lb 13.1 oz)  Body mass index is 28.66 kg/m².     Physical Exam  Vitals and nursing note reviewed.   Constitutional:       General: She is not in acute distress.     Appearance: She is well-developed.   HENT:      Head: Normocephalic and atraumatic.   Eyes:      Pupils: Pupils are equal, round, and reactive to light.   Cardiovascular:       Rate and Rhythm: Regular rhythm.      Heart sounds: No murmur heard.     Comments: BLE edema  Pulmonary:      Breath sounds: No wheezing.      Comments: Bipap  Very distant breath sounds  Okay air movement  No wheezing  No crackles  Speaking in complete sentences while on bipap  Abdominal:      General: There is no distension.      Palpations: Abdomen is soft.      Tenderness: There is no abdominal tenderness. There is no guarding or rebound.   Musculoskeletal:         General: Normal range of motion.      Cervical back: Normal range of motion.   Skin:     Findings: Erythema present. No rash.      Comments: Erythema to bilateral ankles that appears consistent with chronic venous stasis dermatitis    Neurological:      Mental Status: She is alert and oriented to person, place, and time.      Cranial Nerves: No cranial nerve deficit.      Sensory: No sensory deficit.              CRANIAL NERVES     CN III, IV, VI   Pupils are equal, round, and reactive to light.       Significant Labs: All pertinent labs within the past 24 hours have been reviewed.  ABGs:   Recent Labs   Lab 08/25/23 2227 08/26/23  0123   PH 7.275* 7.332*   PCO2 65.5* 56.7*   HCO3 30.4* 30.0*   POCSATURATED 50* 50*   BE 4 4   PO2 31* 30*     CBC:   Recent Labs   Lab 08/25/23  1142 08/25/23 2227 08/25/23 2301 08/26/23  0123   WBC 7.41  --  8.62  --    HGB 9.2*  --  9.8*  --    HCT 30.6* 33* 32.7* 29*     --  194  --      CMP:   Recent Labs   Lab 08/25/23 1142 08/25/23  2301    138   K 4.9 4.3    104   CO2 30* 29   GLU 87 191*   BUN 34* 37*   CREATININE 1.7* 1.8*   CALCIUM 8.9 8.3*   PROT 6.2 6.4   ALBUMIN 2.9* 3.1*   BILITOT 0.3 0.5   ALKPHOS 89 97   AST 14 19   ALT 9* 10   ANIONGAP 9 5*     Cardiac Markers:   Recent Labs   Lab 08/25/23  2301   *     Troponin:   Recent Labs   Lab 08/25/23 2301 08/26/23  0120   TROPONINIHS 10.1 18.4*       Significant Imaging: I have reviewed all pertinent imaging results/findings within  the past 24 hours.  CXR: I have reviewed all pertinent results/findings within the past 24 hours and my personal findings are:  as per HPI  EKG: I have reviewed all pertinent results/findings within the past 24 hours and my personal findings are: as per HPI    Assessment/Plan:     Active Hospital Problems    Diagnosis    Hypertensive emergency    Acute on chronic respiratory failure with hypoxia and hypercapnia    COPD exacerbation    Acute on chronic heart failure with preserved ejection fraction (HFpEF)    Chronic diastolic congestive heart failure    COPD/emphysema    Chronic kidney disease, stage 4 (severe)    CAD (coronary artery disease)    DM type 2, controlled, with complication    Essential hypertension, benign     Plan:    -Patient will be admitted to the MICU on bipap for acute on chronic hypoxic respiratory failure.  I'm not clear on what acutely tipped her over from a respiratory standpoint.  She seems to think she was so frightened that she overdosed on NTG that she became SOB. Alternatively, this could be HTN emergency given significantly elevated BP on presentation with improvement in symptoms once BP improved with Tridil gtt.  CXR does reveal some increased interstitial markings but better than July study. BNP is up from previous, however, and thus certainly could be a volume issue.    -For possible acute on chronic HF, she has been given IV lasix in the ED.  I have restarted lasix po at 20mg daily (takes every other day) and she can be reassessed in the AM if needs to be re dosed with IV lasix based on UOP, renal function, symptoms, etc.  I've consulted Cardiology to weigh in on BP management as well as if she needs to resume daily diuretic given legs are more swollen and BNP is up.  Daily lasix was stopped back in July due to renal insufficiency.  -For possible COPD exacerbation, she received IV solumedrol in the ED. Scheduled Nebs. No present wheezing.  I have started oral prednisone  while evaluation is underway. I do not see an infiltrate to suggest pneumonia on CXR per my review but the official radiology read will need to be followed up later this am.  -I have stopped tridil gtt started for HTN emergency given SBP is now 140s.  ICU RN contacted me that SBP back up to 180s and thus I have started her back on Lisinopril. Cardiology consulted as above to weigh in on blood pressure management.  -Will watch renal function while back on ACEi and Lasix.  Presently at her baseline.    -Weaning bipap as able.  Pulmonary consult if not making progress.  -Trending troponin given initial complaint of chest pain. EKG is non ischemic.   -ISS. Accuchecks.  -Home MAR is not presently updated.  I have resumed home meds based on last discharge summary and PCP note. Will need to be verified. Some seemingly non essential home medication have been held.   -DVT Px with lovenox      VTE Risk Mitigation (From admission, onward)         Ordered     enoxaparin injection 30 mg  Daily         08/26/23 0236     IP VTE HIGH RISK PATIENT  Once         08/26/23 0236     Place sequential compression device  Until discontinued         08/26/23 0236              .             Tommie Mcmullen MD  Department of Hospital Medicine  [facility] hospital

## 2023-08-26 NOTE — CONSULTS
[facility]  Cardiology  Consult Note    Patient Name: Marisol Kerr  MRN: [id number]  Admission Date: 8/25/2023  Hospital Length of Stay: 0 days  Code Status: Full Code   Attending Provider: Elio Grant MD   Consulting Provider: Khadar Burt MD  Primary Care Physician: Khadar Dwyer MD  Principal Problem:<principal problem not specified>    Patient information was obtained from patient, past medical records, and ER records.     Consults  Subjective:     Chief Complaint:   Chest pain    HPI:  Problem 1 patient has ASCVD.  In 2022 cardiac catheterization demonstrated significant disease in the proximal right coronary right posterior descending artery, circumflex system, and moderate disease in the LAD  She was sent to [facility] but it did not do revascularization because of her underlying lung disease  She is been treated medically since-she is been on beta-blockers nitrates and blood pressure medication as well as Lasix 20 a day  She had a treadmill in July that did not demonstrate any definitive severe reversible ischemia  She had chest pain the other day and inadvertently took 2 squirts of nitroglycerin and chest discomfort got worse  Paramedics stated that her blood pressure is very high  EKGs shows no acute changes and troponins only slightly elevated  Currently not having chest pain she does have elevated heart rate even on beta-blockers  Problem 2.  She has high blood pressure.  She does not have blood pressure monitor at home and tends to run hypertension-  3. She is overweight has chronic kidney disease and has significant underlying obstructive lung disease with CO2 retention    Past Medical History:   Diagnosis Date    CAD (coronary artery disease)     Cancer     colon    CHF (congestive heart failure)     Colitis     Colon cancer     COPD (chronic obstructive pulmonary disease)     Decreased hearing     Diabetes mellitus     Diabetes mellitus, type 2     Hypercholesterolemia      Hypertension     Hypoxemia     Insomnia     Obesity     Osteoarthritis        Past Surgical History:   Procedure Laterality Date    ANGIOGRAM, CORONARY, WITH LEFT HEART CATHETERIZATION N/A 2/10/2022    Procedure: Angiogram, Coronary, with Left Heart Cath;  Surgeon: Cristian Benjamin MD;  Location: [facility] CATH/EP LAB;  Service: Cardiology;  Laterality: N/A;    CARDIAC CATHETERIZATION      CHOLECYSTECTOMY      COLECTOMY      CORONARY ANGIOPLASTY      CORONARY STENT PLACEMENT      ERCP N/A 1/16/2023    Procedure: ERCP (ENDOSCOPIC RETROGRADE CHOLANGIOPANCREATOGRAPHY);  Surgeon: Biju Kramer III, MD;  Location: [facility] ENDO;  Service: Endoscopy;  Laterality: N/A;    ESOPHAGOGASTRODUODENOSCOPY N/A 1/29/2023    Procedure: EGD (ESOPHAGOGASTRODUODENOSCOPY);  Surgeon: Aarti Alvarez MD;  Location: [facility];  Service: Endoscopy;  Laterality: N/A;    EXTERNAL EAR SURGERY      EYE SURGERY      FLEXIBLE SIGMOIDOSCOPY N/A 9/19/2019    Procedure: SIGMOIDOSCOPY, FLEXIBLE;  Surgeon: Biju Kramer III, MD;  Location: [facility];  Service: Endoscopy;  Laterality: N/A;    HYSTERECTOMY      partial       Review of patient's allergies indicates:   Allergen Reactions    Iodinated contrast media     Strawberries [strawberry] Hives and Itching       No current facility-administered medications on file prior to encounter.     Current Outpatient Medications on File Prior to Encounter   Medication Sig    albuterol (VENTOLIN HFA) 90 mcg/actuation inhaler Inhale 2 puffs into the lungs every 6 (six) hours as needed for Wheezing or Shortness of Breath. Rescue    albuterol-ipratropium (DUO-NEB) 2.5 mg-0.5 mg/3 mL nebulizer solution Take 3 mLs by nebulization every 4 (four) hours as needed for Wheezing or Shortness of Breath. Rescue    allopurinoL (ZYLOPRIM) 100 MG tablet Take 0.5 tablets (50 mg total) by mouth once daily.    ALPRAZolam (XANAX) 0.25 MG tablet Take 1 tablet (0.25 mg total) by mouth every evening.    atorvastatin  (LIPITOR) 40 MG tablet Take 1 tablet (40 mg total) by mouth once daily.    clopidogreL (PLAVIX) 75 mg tablet Take 1 tablet (75 mg total) by mouth once daily.    coenzyme Q10 100 mg capsule Take 100 mg by mouth every morning.    colchicine (COLCRYS) 0.6 mg tablet Take 0.3 mg by mouth every other day.    ergocalciferol (VITAMIN D2) 50,000 unit Cap Take 1 capsule (50,000 Units total) by mouth every 7 days.    ferrous sulfate 325 (65 FE) MG EC tablet Take 325 mg by mouth once daily.    fish oil-omega-3 fatty acids 300-1,000 mg capsule Take 2 capsules by mouth every morning.    fluticasone-salmeterol diskus inhaler 250-50 mcg Inhale 1 puff into the lungs 2 (two) times a day.    furosemide (LASIX) 20 MG tablet Take 1 tablet (20 mg total) by mouth every other day. TAKE WITH POTASSIUM    ipratropium-albuteroL (COMBIVENT RESPIMAT)  mcg/actuation inhaler Inhale 2 puffs into the lungs every 4 (four) hours as needed for Shortness of Breath. Rescue    isosorbide mononitrate (IMDUR) 30 MG 24 hr tablet Take 1 tablet (30 mg total) by mouth once daily.    ketoconazole (NIZORAL) 2 % cream Apply 1 application  topically 2 (two) times daily.    magnesium oxide (MAG-OX) 400 mg (241.3 mg magnesium) tablet Take 1 tablet by mouth 2 (two) times daily.    metoprolol tartrate 75 mg Tab Take 1 tablet (75 mg total) by mouth 3 (three) times daily.    mupirocin (BACTROBAN) 2 % ointment Apply topically 2 (two) times daily.    nitroGLYCERIN 0.4 MG/DOSE TL SPRY (NITROLINGUAL) 400 mcg/spray spray Place 1 spray under the tongue every 5 (five) minutes as needed for Chest pain (max of 3 doses).    pantoprazole (PROTONIX) 40 MG tablet Take 1 tablet (40 mg total) by mouth once daily.    polycarbophil (FIBERCON) 625 mg tablet Take 625 mg by mouth once daily.    potassium chloride (MICRO-K) 10 MEQ CpSR Take 1 capsule (10 mEq total) by mouth every other day. (TAKE WITH LASIX/FUROSEMIDE)    ranolazine (RANEXA) 500 MG Tb12 Take 1 tablet (500 mg total) by  mouth 2 (two) times daily.    triamcinolone acetonide 0.1% (KENALOG) 0.1 % cream Apply 1 g topically 2 (two) times daily.    umeclidinium (INCRUSE ELLIPTA) 62.5 mcg/actuation inhalation capsule Inhale 62.5 mcg into the lungs every morning. Controller    vit C/E/Zn/coppr/lutein/zeaxan (PRESERVISION AREDS-2 ORAL) Take 1 tablet by mouth once daily.    VITAMIN C 500 MG tablet Take 500 mg by mouth 2 (two) times daily.    [DISCONTINUED] benazepriL (LOTENSIN) 40 MG tablet Take 1 tablet (40 mg total) by mouth once daily.    [DISCONTINUED] cimetidine (TAGAMET) 300 MG tablet Take 1 tablet (300 mg total) by mouth every 6 (six) hours. Take 1 tablet (300 mg total) by mouth starting at 6 PM on 2022 (the evening before your angiogram procedure). Then take 1 tablet at 12 AM, then take 1 tablet at 6 AM on .    [DISCONTINUED] lisinopriL 10 MG tablet Take 1 tablet (10 mg total) by mouth once daily. HOLD UNTIL OTHERWISE DIRECTED BY PRIMARY CARE PROVIDER    [DISCONTINUED] lovastatin (MEVACOR) 40 MG tablet Take 1 tablet (40 mg total) by mouth every other day.     Family History       Problem Relation (Age of Onset)    Febrile seizures Mother    Heart failure Father          Tobacco Use    Smoking status: Former     Current packs/day: 0.00     Average packs/day: 3.0 packs/day for 50.0 years (150.1 ttl pk-yrs)     Types: Cigarettes     Start date: 3/30/1964     Quit date: 2006     Years since quittin.5    Smokeless tobacco: Never    Tobacco comments:     ex smoker 15 years   Substance and Sexual Activity    Alcohol use: Yes    Drug use: No    Sexual activity: Never     ROS  Objective:     Vital Signs (Most Recent):  Temp: 98.8 °F (37.1 °C) (23 0301)  Pulse: 91 (23 0843)  Resp: (!) 22 (23 0740)  BP: (!) 197/86 (23 0843)  SpO2: 100 % (23 0740) Vital Signs (24h Range):  Temp:  [98.8 °F (37.1 °C)-99.3 °F (37.4 °C)] 98.8 °F (37.1 °C)  Pulse:  [73-92] 91  Resp:  [13-39]  "22  SpO2:  [94 %-100 %] 100 %  BP: (139-216)/(62-97) 197/86     Weight: 73.4 kg (161 lb 13.1 oz)  Body mass index is 28.66 kg/m².    SpO2: 100 %         Intake/Output Summary (Last 24 hours) at 8/26/2023 1003  Last data filed at 8/26/2023 0600  Gross per 24 hour   Intake --   Output 450 ml   Net -450 ml       Lines/Drains/Airways       Peripheral Intravenous Line  Duration                  Peripheral IV - Single Lumen 08/25/23 2223 18 G Anterior;Proximal;Right Forearm <1 day         Peripheral IV - Single Lumen 08/25/23 2243 20 G Anterior;Left Upper Arm <1 day                    Physical Exam blood pressure is 160/80  No definitive carotid bruits  Lungs diminished breath sounds increased expiratory phase some wheezes  Cardiac S4 no S3  Abdomen is obese  Extremities minimal edema        Significant Labs:  decreased pulsesCMP   Recent Labs   Lab 08/25/23  1142 08/25/23  2301 08/26/23  0432    138 136   K 4.9 4.3 4.7    104 100   CO2 30* 29 29   GLU 87 191* 165*   BUN 34* 37* 32*   CREATININE 1.7* 1.8* 1.5*   CALCIUM 8.9 8.3* 8.4*   PROT 6.2 6.4 5.4*   ALBUMIN 2.9* 3.1* 2.7*   BILITOT 0.3 0.5 0.5   ALKPHOS 89 97 72   AST 14 19 16   ALT 9* 10 9*   ANIONGAP 9 5* 7*   , CBC   Recent Labs   Lab 08/25/23  1142 08/25/23  2227 08/25/23  2301 08/26/23  0123 08/26/23  0432   WBC 7.41  --  8.62  --  8.62   HGB 9.2*  --  9.8*  --  9.3*   HCT 30.6*   < > 32.7*   < > 30.5*     --  194  --  175    < > = values in this interval not displayed.   , and Troponin No results for input(s): "TROPONINI" in the last 48 hours.     Echo  7/23   Significant Imaging: Normal systolic function.  Normal left ventricular diastolic function.  The estimated ejection fraction is 60%.  Atrial fibrillation not observed.  Normal right ventricular size with normal right ventricular systolic function.  Mild left atrial enlargement.  Aortic valve is calcified but there does not appear to be significant aortic stenosis-  Aortic valve area is " cm2; peak velocity is 1.47 m/s; mean gradient is 5 mmHg.  Mild mitral regurgitation.  Mild tricuspid regurgitation.  Normal central venous pressure (3 mmHg).  The estimated PA systolic pressure is 20 mmHg.  Assessment and Plan:   1. Obesity, underlying lung disease, chronic kidney disease  2. Significant coronary disease specially in the right coronary and circumflex both of which were heavily calcified and tortuous being treated medically-patient's angina is exacerbated by elevated heart rates and blood pressure  Good possibility of blood pressures remaining high at home  She needs good beta-blockade if she can tolerate it and will add topical nitrates along with the p.o. nitrates for the time being  3. High blood pressure-she is under [facility] is home monitoring program but she needs a blood pressure cuff at home to monitor pressure twice a day to keep it under control  Add low-dose aspirin, she has mild heart failure preserved ejection fraction, may need add diltiazem   Active Diagnoses:    Diagnosis Date Noted POA    Hypertensive emergency [I16.1] 08/26/2023 Yes    Acute on chronic respiratory failure with hypoxia and hypercapnia [J96.21, J96.22] 05/26/2023 Yes    COPD exacerbation [J44.1] 01/09/2023 Yes    Acute on chronic heart failure with preserved ejection fraction (HFpEF) [I50.33] 06/30/2022 Yes    Chronic diastolic congestive heart failure [I50.32] 09/17/2019 Yes    COPD/emphysema [J44.9] 01/16/2019 Yes     Chronic    Chronic kidney disease, stage 4 (severe) [N18.4] 10/08/2014 Yes    CAD (coronary artery disease) [I25.10] 06/26/2014 Yes    DM type 2, controlled, with complication [E11.8] 06/03/2013 Yes    Essential hypertension, benign [I10] 04/09/2013 Yes      Problems Resolved During this Admission:       VTE Risk Mitigation (From admission, onward)           Ordered     enoxaparin injection 30 mg  Daily         08/26/23 0236     IP VTE HIGH RISK PATIENT  Once         08/26/23 0236     Place sequential  compression device  Until discontinued         08/26/23 0236                  I substantially and  personally reviewed old and new ecg's, lab reports,, xray reports  and  other cardiovascular studies including  echo's, stress tests, angiogram reports, holters,and vascular studies .  In addition I evaluated original cardiac cath  ___echo  __x__cxr mild pulmonary congestion ______ct ____scan on [employer] or [employer] or other viewing platforms and  ___x_EKG's .  No acute changes   I reviewed  office and hospital notes Yes ____ of  referring providers and other providers.  I reviewed personally old hospital and office notes   Time spent evaluating and managing this patient:________min.      Thank you for your consult.     Khadar Burt MD  Cardiology   [facility]

## 2023-08-26 NOTE — ED PROVIDER NOTES
Encounter Date: 8/25/2023       History     Chief Complaint   Patient presents with    Shortness of Breath     4L O2 at home, took 2 doses of Nitro at home, received duoneb with EMS    Chest Pain     HPI  78-year-old presenting with respiratory distress.  History limited as patient unable to speak due to respiratory distress and being placed on BiPAP.  Per EMS patient called 911 due to chest pain but when they arrived they found patient was in respiratory distress and gave around of duo nebs and placed patient on CPAP.  Patient on 4 L of oxygen at home.  Review of patient's allergies indicates:   Allergen Reactions    Iodinated contrast media     Strawberries [strawberry] Hives and Itching     Past Medical History:   Diagnosis Date    CAD (coronary artery disease)     Cancer     colon    CHF (congestive heart failure)     Colitis     Colon cancer     COPD (chronic obstructive pulmonary disease)     Decreased hearing     Diabetes mellitus     Diabetes mellitus, type 2     Hypercholesterolemia     Hypertension     Hypoxemia     Insomnia     Obesity     Osteoarthritis      Past Surgical History:   Procedure Laterality Date    ANGIOGRAM, CORONARY, WITH LEFT HEART CATHETERIZATION N/A 2/10/2022    Procedure: Angiogram, Coronary, with Left Heart Cath;  Surgeon: Cristian Benjamin MD;  Location: [facility] CATH/EP LAB;  Service: Cardiology;  Laterality: N/A;    CARDIAC CATHETERIZATION      CHOLECYSTECTOMY      COLECTOMY      CORONARY ANGIOPLASTY      CORONARY STENT PLACEMENT      ERCP N/A 1/16/2023    Procedure: ERCP (ENDOSCOPIC RETROGRADE CHOLANGIOPANCREATOGRAPHY);  Surgeon: Biju Kramer III, MD;  Location: [facility];  Service: Endoscopy;  Laterality: N/A;    ESOPHAGOGASTRODUODENOSCOPY N/A 1/29/2023    Procedure: EGD (ESOPHAGOGASTRODUODENOSCOPY);  Surgeon: Aarti Alvarez MD;  Location: [facility];  Service: Endoscopy;  Laterality: N/A;    EXTERNAL EAR SURGERY      EYE SURGERY      FLEXIBLE SIGMOIDOSCOPY N/A 9/19/2019     Procedure: SIGMOIDOSCOPY, FLEXIBLE;  Surgeon: Biju Kramer III, MD;  Location: [facility];  Service: Endoscopy;  Laterality: N/A;    HYSTERECTOMY      partial     Family History   Problem Relation Age of Onset    Febrile seizures Mother     Heart failure Father      Social History     Tobacco Use    Smoking status: Former     Current packs/day: 0.00     Average packs/day: 3.0 packs/day for 50.0 years (150.1 ttl pk-yrs)     Types: Cigarettes     Start date: 3/30/1964     Quit date: 2006     Years since quittin.5    Smokeless tobacco: Never    Tobacco comments:     ex smoker 15 years   Substance Use Topics    Alcohol use: Yes    Drug use: No     Review of Systems   Respiratory:  Positive for shortness of breath.        Physical Exam     Initial Vitals [23 2211]   BP Pulse Resp Temp SpO2   (!) 216/97 92 (!) 22 99.3 °F (37.4 °C) 100 %      MAP       --         Physical Exam    ED Course   Critical Care    Date/Time: 2023 3:53 AM    Performed by: Aracelis Pereira MD  Authorized by: Aracelis Pereira MD  Direct patient critical care time: 45 minutes  Additional history critical care time: 10 minutes  Ordering / reviewing critical care time: 5 minutes  Documentation critical care time: 10 minutes  Consulting other physicians critical care time: 5 minutes  Consult with family critical care time: 0 minutes  Other critical care time: 0 minutes  Total critical care time (exclusive of procedural time) : 75 minutes  Critical care was necessary to treat or prevent imminent or life-threatening deterioration of the following conditions: respiratory failure.  Critical care was time spent personally by me on the following activities: development of treatment plan with patient or surrogate, discussions with consultants, evaluation of patient's response to treatment, examination of patient, obtaining history from patient or surrogate, ordering and performing treatments and interventions, ordering and review  of laboratory studies, ordering and review of radiographic studies, pulse oximetry, re-evaluation of patient's condition and review of old charts.        Labs Reviewed   CBC W/ AUTO DIFFERENTIAL - Abnormal; Notable for the following components:       Result Value    RBC 3.40 (*)     Hemoglobin 9.8 (*)     Hematocrit 32.7 (*)     MCHC 30.0 (*)     RDW 14.9 (*)     Gran % 74.2 (*)     Lymph % 16.7 (*)     All other components within normal limits   COMPREHENSIVE METABOLIC PANEL - Abnormal; Notable for the following components:    Glucose 191 (*)     BUN 37 (*)     Creatinine 1.8 (*)     Calcium 8.3 (*)     Albumin 3.1 (*)     eGFR 28.8 (*)     Anion Gap 5 (*)     All other components within normal limits   B-TYPE NATRIURETIC PEPTIDE - Abnormal; Notable for the following components:     (*)     All other components within normal limits   ISTAT PROCEDURE - Abnormal; Notable for the following components:    POC PH 7.275 (*)     POC PCO2 65.5 (*)     POC PO2 31 (*)     POC HCO3 30.4 (*)     POC SATURATED O2 50 (*)     POC Glucose 189 (*)     POC TCO2 32 (*)     POC Hematocrit 33 (*)     All other components within normal limits   ISTAT PROCEDURE - Abnormal; Notable for the following components:    POC PH 7.332 (*)     POC PCO2 56.7 (*)     POC PO2 30 (*)     POC HCO3 30.0 (*)     POC SATURATED O2 50 (*)     POC Glucose 177 (*)     POC TCO2 32 (*)     POC Hematocrit 29 (*)     All other components within normal limits   MAGNESIUM   TROPONIN I HIGH SENSITIVITY   SARS-COV-2 RNA AMPLIFICATION, QUAL   INFLUENZA A AND B ANTIGEN    Narrative:     Specimen Source->Nasopharyngeal Swab        ECG Results              EKG 12-lead (In process)  Result time 08/26/23 06:35:30      In process by Interface, Lab In [facility] (08/26/23 06:35:30)                   Narrative:    Test Reason : R07.9,    Vent. Rate : 093 BPM     Atrial Rate : 093 BPM     P-R Int : 146 ms          QRS Dur : 084 ms      QT Int : 364 ms       P-R-T Axes :  086 070 066 degrees     QTc Int : 452 ms    Normal sinus rhythm  Nonspecific ST and T wave abnormality  Abnormal ECG  When compared with ECG of 27-JUL-2023 05:54,  No significant change was found    Referred By: [email or web address]   SELF           Confirmed By:                       In process by Interface, Lab In [facility] (08/26/23 06:18:10)                   Narrative:    Test Reason : R07.9,    Vent. Rate : 093 BPM     Atrial Rate : 093 BPM     P-R Int : 146 ms          QRS Dur : 084 ms      QT Int : 364 ms       P-R-T Axes : 086 070 066 degrees     QTc Int : 452 ms    Normal sinus rhythm  Nonspecific ST and T wave abnormality  Abnormal ECG  When compared with ECG of 27-JUL-2023 05:54,  No significant change was found    Referred By: [email or web address]   SELF           Confirmed By:                                   Imaging Results              X-Ray Chest AP Portable (Final result)  Result time 08/26/23 08:36:35      Final result by Flavio Lopez MD (08/26/23 08:36:35)                   Impression:      1. Unchanged bibasilar reticular opacities.  2. Hazy opacities at lung bases suggesting trace bilateral pleural effusions, new.      Electronically signed by: Flavio Lopez MD  Date:    08/26/2023  Time:    08:36               Narrative:    EXAMINATION:  XR CHEST AP PORTABLE    CLINICAL HISTORY:  Chest Pain;    FINDINGS:  Portable chest at 2254 compared with 07/27/2023 shows normal cardiomediastinal silhouette. Right subclavian central venous catheter tip in region of right brachial cephalic vein unchanged.    Bibasilar reticular opacities show no significant change.  Minimal hazy opacities at lung bases suggest trace bilateral pleural effusions.  Pulmonary vasculature is normal. No pneumothorax.    No acute osseous abnormality.                                       Medications   sodium chloride 0.9% flush 10 mL (has no administration in time range)   naloxone 0.4 mg/mL injection 0.02 mg (has no administration in time range)    glucose chewable tablet 16 g (has no administration in time range)   glucose chewable tablet 24 g (has no administration in time range)   dextrose 50% injection 12.5 g (has no administration in time range)   dextrose 50% injection 25 g (has no administration in time range)   glucagon (human recombinant) injection 1 mg (has no administration in time range)   acetaminophen tablet 650 mg (has no administration in time range)   polyethylene glycol packet 17 g (has no administration in time range)   ondansetron injection 4 mg (4 mg Intravenous Given 8/26/23 2136)   melatonin tablet 6 mg (has no administration in time range)   albuterol-ipratropium 2.5 mg-0.5 mg/3 mL nebulizer solution 3 mL (3 mLs Nebulization Given 8/26/23 1952)   predniSONE tablet 40 mg (40 mg Oral Given 8/26/23 0844)   mupirocin 2 % ointment (1 g Nasal Given 8/26/23 2037)   ALPRAZolam tablet 0.25 mg (0.25 mg Oral Given 8/26/23 2037)   atorvastatin tablet 40 mg (40 mg Oral Given 8/26/23 2037)   clopidogreL tablet 75 mg (75 mg Oral Given 8/26/23 0843)   isosorbide mononitrate 24 hr tablet 30 mg (30 mg Oral Given 8/26/23 0843)   pantoprazole EC tablet 40 mg (40 mg Oral Given 8/26/23 0713)   ranolazine 12 hr tablet 500 mg (500 mg Oral Given 8/26/23 2036)   lisinopriL tablet 20 mg (20 mg Oral Given 8/26/23 0448)   potassium bicarbonate disintegrating tablet 50 mEq (has no administration in time range)   potassium bicarbonate disintegrating tablet 35 mEq (has no administration in time range)   potassium bicarbonate disintegrating tablet 60 mEq (has no administration in time range)   magnesium oxide tablet 800 mg (800 mg Oral Given 8/26/23 1444)   magnesium oxide tablet 800 mg (has no administration in time range)   potassium, sodium phosphates 280-160-250 mg packet 2 packet (has no administration in time range)   potassium, sodium phosphates 280-160-250 mg packet 2 packet (has no administration in time range)   potassium, sodium phosphates 280-160-250 mg  packet 2 packet (has no administration in time range)   furosemide tablet 40 mg (has no administration in time range)   nitroGLYCERIN 2% TD oint ointment 1 inch (1 inch Topical (Top) Given 8/26/23 2139)   metoprolol succinate (TOPROL-XL) 24 hr tablet 50 mg (50 mg Oral Given 8/26/23 2036)   aspirin EC tablet 81 mg (81 mg Oral Given 8/26/23 1445)   polycarbophil tablet 625 mg (625 mg Oral Not Given 8/26/23 1945)   nitroGLYCERIN SL tablet 0.4 mg (has no administration in time range)   morphine injection 2 mg (2 mg Intravenous Given 8/26/23 2209)   nitroGLYCERIN in 5 % dextrose 50 mg/250 mL (200 mcg/mL) infusion (10 mcg/min Intravenous New Bag 8/26/23 2158)   cloNIDine tablet 0.2 mg (0.2 mg Oral Not Given 8/26/23 2230)   enoxaparin injection 70 mg (has no administration in time range)   nitroGLYCERIN SL tablet 0.4 mg (has no administration in time range)   albuterol-ipratropium (DUO-NEB) 2.5 mg-0.5 mg/3 mL nebulizer solution (6 mLs  Given 8/25/23 2228)   furosemide injection 40 mg (40 mg Intravenous Given 8/25/23 2251)   methylPREDNISolone sodium succinate injection 125 mg (125 mg Intravenous Given 8/25/23 2257)   enoxaparin injection 40 mg (40 mg Subcutaneous Given 8/26/23 2235)     Medical Decision Making  Amount and/or Complexity of Data Reviewed  Labs: ordered.  Radiology: ordered.    Risk  Prescription drug management.  Decision regarding hospitalization.    78-year-old woman presenting with respiratory failure.  History of CHF, COPD, htn.  Multiple admissions for CHF exacerbation.    Vitals within acceptable limits except for respiratory rate in the 30s,  Maps in the 130's     Differential includes CHF exacerbation, COPD exacerbation, pneumonia, scape, MI    Patient in respiratory distress on arrival.  Not hypoxic but tachypneic increased work breathing and lung exam with significantly decreased air movement bilaterally.  Due to history of COPD treated with 3 total duo nebs including 1 from EMS as well as  steroids.  Concomitantly treated for CHF exacerbation with BiPAP and Lasix.  Patient mildly improving with these treatments but with suspicion for scape started no to drip and patient significantly improving after this.Did not bring MAP less than 25% of original average map of 129.  Discussed intubation with patient but with the treatments above patient's clinical status improved and she was continued on BiPAP and did not need intubation at this time.  Initially patient unable to speak, on time of admission patient is speaking in very long sentences, asking to be trialed off BiPAP but still with some accessory muscle use therefore kept on BiPAP.  EKG no sign of acute occlusion myocardial infarction.  Patient admitted to has been on medicine on BiPAP. Considered PE but clinical presentation more consistent with scape, chf/copd exacerbation therefore no ct pe at this time.   Aracelis Pereira MD  Emergency Medicine Staff Physician  4:01 AM                                  Clinical Impression:   Final diagnoses:  [R07.9] Chest pain  [J96.90] Respiratory failure        ED Disposition Condition    Admit                 Aracelis Pereira MD  08/27/23 0424

## 2023-08-26 NOTE — SUBJECTIVE & OBJECTIVE
Interval History:     Review of Systems  Objective:     Vital Signs (Most Recent):  Temp: 98.4 °F (36.9 °C) (08/26/23 1230)  Pulse: 71 (08/26/23 1500)  Resp: 18 (08/26/23 1500)  BP: (!) 167/72 (08/26/23 1500)  SpO2: 99 % (08/26/23 1500) Vital Signs (24h Range):  Temp:  [98.4 °F (36.9 °C)-99.3 °F (37.4 °C)] 98.4 °F (36.9 °C)  Pulse:  [68-92] 71  Resp:  [13-39] 18  SpO2:  [94 %-100 %] 99 %  BP: (139-216)/(62-97) 167/72     Weight: 73.4 kg (161 lb 13.1 oz)  Body mass index is 28.66 kg/m².    Intake/Output Summary (Last 24 hours) at 8/26/2023 1519  Last data filed at 8/26/2023 1019  Gross per 24 hour   Intake 360 ml   Output 1100 ml   Net -740 ml         Physical Exam  Vitals and nursing note reviewed.   HENT:      Head: Normocephalic and atraumatic.   Eyes:      Conjunctiva/sclera: Conjunctivae normal.   Neck:      Vascular: No JVD.   Cardiovascular:      Heart sounds: Normal heart sounds.   Pulmonary:      Effort: Pulmonary effort is normal.      Breath sounds: Normal breath sounds.   Abdominal:      Palpations: Abdomen is soft.   Musculoskeletal:         General: Normal range of motion.   Skin:     General: Skin is warm.   Neurological:      General: No focal deficit present.      Mental Status: She is alert and oriented to person, place, and time.   Psychiatric:         Mood and Affect: Mood normal.             Significant Labs: All pertinent labs within the past 24 hours have been reviewed.  CBC:   Recent Labs   Lab 08/25/23  1142 08/25/23  2227 08/25/23  2301 08/26/23  0123 08/26/23  0432   WBC 7.41  --  8.62  --  8.62   HGB 9.2*  --  9.8*  --  9.3*   HCT 30.6*   < > 32.7* 29* 30.5*     --  194  --  175    < > = values in this interval not displayed.     CMP:   Recent Labs   Lab 08/25/23  1142 08/25/23  2301 08/26/23  0432    138 136   K 4.9 4.3 4.7    104 100   CO2 30* 29 29   GLU 87 191* 165*   BUN 34* 37* 32*   CREATININE 1.7* 1.8* 1.5*   CALCIUM 8.9 8.3* 8.4*   PROT 6.2 6.4 5.4*   ALBUMIN  2.9* 3.1* 2.7*   BILITOT 0.3 0.5 0.5   ALKPHOS 89 97 72   AST 14 19 16   ALT 9* 10 9*   ANIONGAP 9 5* 7*     Cardiac Markers:   Recent Labs   Lab 08/25/23  2301   *       Significant Imaging: I have reviewed all pertinent imaging results/findings within the past 24 hours.

## 2023-08-26 NOTE — PROGRESS NOTES
Pharmacist Renal Dose Adjustment Note    Marisol Kerr is a 78 y.o. female being treated with the medication Enoxaparin    Patient Data:    Vital Signs (Most Recent):  Temp: 98.8 °F (37.1 °C) (08/26/23 0230)  Pulse: 78 (08/26/23 0230)  Resp: (!) 39 (08/26/23 0230)  BP: (!) 181/79 (08/26/23 0230)  SpO2: 98 % (08/26/23 0230) Vital Signs (72h Range):  Temp:  [98.8 °F (37.1 °C)-99.3 °F (37.4 °C)]   Pulse:  [73-92]   Resp:  [20-39]   BP: (139-216)/(62-97)   SpO2:  [94 %-100 %]      Recent Labs   Lab 08/25/23  1142 08/25/23  2301   CREATININE 1.7* 1.8*     Serum creatinine: 1.8 mg/dL (H) 08/25/23 2301  Estimated creatinine clearance: 25.5 mL/min (A)    Per [facility] renal dosing protocol:     Previous Order: Enoxaparin 40 mg daily    Will be changed to:     New Order: Enoxaparin 30 mg daily,    Due to: crcl < 30 ml/min    Renal dose adjustments performed as noted above.    We will continue monitoring and adjusting as necessary.    Pharmacist: Augie Wilder, PharmD  Ext: [phone]

## 2023-08-27 LAB
ALBUMIN SERPL BCP-MCNC: 2.7 G/DL (ref 3.5–5.2)
ALP SERPL-CCNC: 59 U/L (ref 55–135)
ALT SERPL W/O P-5'-P-CCNC: 11 U/L (ref 10–44)
ANION GAP SERPL CALC-SCNC: 3 MMOL/L (ref 8–16)
AST SERPL-CCNC: 17 U/L (ref 10–40)
BASOPHILS # BLD AUTO: 0.02 K/UL (ref 0–0.2)
BASOPHILS NFR BLD: 0.2 % (ref 0–1.9)
BILIRUB SERPL-MCNC: 0.5 MG/DL (ref 0.1–1)
BUN SERPL-MCNC: 33 MG/DL (ref 8–23)
CALCIUM SERPL-MCNC: 8.1 MG/DL (ref 8.7–10.5)
CHLORIDE SERPL-SCNC: 104 MMOL/L (ref 95–110)
CO2 SERPL-SCNC: 31 MMOL/L (ref 23–29)
CREAT SERPL-MCNC: 1.2 MG/DL (ref 0.5–1.4)
DIFFERENTIAL METHOD: ABNORMAL
EOSINOPHIL # BLD AUTO: 0 K/UL (ref 0–0.5)
EOSINOPHIL NFR BLD: 0.4 % (ref 0–8)
ERYTHROCYTE [DISTWIDTH] IN BLOOD BY AUTOMATED COUNT: 14.9 % (ref 11.5–14.5)
EST. GFR  (NO RACE VARIABLE): 46.9 ML/MIN/1.73 M^2
GLUCOSE SERPL-MCNC: 113 MG/DL (ref 70–110)
HCT VFR BLD AUTO: 27.3 % (ref 37–48.5)
HGB BLD-MCNC: 8.4 G/DL (ref 12–16)
IMM GRANULOCYTES # BLD AUTO: 0.02 K/UL (ref 0–0.04)
IMM GRANULOCYTES NFR BLD AUTO: 0.2 % (ref 0–0.5)
LYMPHOCYTES # BLD AUTO: 1.4 K/UL (ref 1–4.8)
LYMPHOCYTES NFR BLD: 16.5 % (ref 18–48)
MAGNESIUM SERPL-MCNC: 2.3 MG/DL (ref 1.6–2.6)
MCH RBC QN AUTO: 29.1 PG (ref 27–31)
MCHC RBC AUTO-ENTMCNC: 30.8 G/DL (ref 32–36)
MCV RBC AUTO: 95 FL (ref 82–98)
MONOCYTES # BLD AUTO: 0.6 K/UL (ref 0.3–1)
MONOCYTES NFR BLD: 7.5 % (ref 4–15)
NEUTROPHILS # BLD AUTO: 6.3 K/UL (ref 1.8–7.7)
NEUTROPHILS NFR BLD: 75.2 % (ref 38–73)
NRBC BLD-RTO: 0 /100 WBC
PLATELET # BLD AUTO: 157 K/UL (ref 150–450)
PMV BLD AUTO: 12 FL (ref 9.2–12.9)
POTASSIUM SERPL-SCNC: 4.4 MMOL/L (ref 3.5–5.1)
PROT SERPL-MCNC: 5.2 G/DL (ref 6–8.4)
RBC # BLD AUTO: 2.89 M/UL (ref 4–5.4)
SODIUM SERPL-SCNC: 138 MMOL/L (ref 136–145)
TROPONIN I SERPL HS-MCNC: 35 PG/ML (ref 0–14.9)
WBC # BLD AUTO: 8.31 K/UL (ref 3.9–12.7)

## 2023-08-27 PROCEDURE — 85025 COMPLETE CBC W/AUTO DIFF WBC: CPT | Performed by: INTERNAL MEDICINE

## 2023-08-27 PROCEDURE — 25000242 PHARM REV CODE 250 ALT 637 W/ HCPCS: Performed by: INTERNAL MEDICINE

## 2023-08-27 PROCEDURE — 93005 ELECTROCARDIOGRAM TRACING: CPT | Performed by: SPECIALIST

## 2023-08-27 PROCEDURE — 83735 ASSAY OF MAGNESIUM: CPT | Performed by: INTERNAL MEDICINE

## 2023-08-27 PROCEDURE — 99232 SBSQ HOSP IP/OBS MODERATE 35: CPT | Mod: ,,, | Performed by: SPECIALIST

## 2023-08-27 PROCEDURE — 63600175 PHARM REV CODE 636 W HCPCS: Performed by: SPECIALIST

## 2023-08-27 PROCEDURE — 25000003 PHARM REV CODE 250: Performed by: SPECIALIST

## 2023-08-27 PROCEDURE — 99900035 HC TECH TIME PER 15 MIN (STAT)

## 2023-08-27 PROCEDURE — 99900031 HC PATIENT EDUCATION (STAT)

## 2023-08-27 PROCEDURE — 94761 N-INVAS EAR/PLS OXIMETRY MLT: CPT

## 2023-08-27 PROCEDURE — 36415 COLL VENOUS BLD VENIPUNCTURE: CPT | Performed by: INTERNAL MEDICINE

## 2023-08-27 PROCEDURE — 93010 EKG 12-LEAD: ICD-10-PCS | Mod: ,,, | Performed by: SPECIALIST

## 2023-08-27 PROCEDURE — 80053 COMPREHEN METABOLIC PANEL: CPT | Performed by: INTERNAL MEDICINE

## 2023-08-27 PROCEDURE — 25000003 PHARM REV CODE 250: Performed by: INTERNAL MEDICINE

## 2023-08-27 PROCEDURE — 63600175 PHARM REV CODE 636 W HCPCS: Performed by: INTERNAL MEDICINE

## 2023-08-27 PROCEDURE — 99232 PR SUBSEQUENT HOSPITAL CARE,LEVL II: ICD-10-PCS | Mod: ,,, | Performed by: SPECIALIST

## 2023-08-27 PROCEDURE — 20000000 HC ICU ROOM

## 2023-08-27 PROCEDURE — 84484 ASSAY OF TROPONIN QUANT: CPT | Performed by: SPECIALIST

## 2023-08-27 PROCEDURE — 94660 CPAP INITIATION&MGMT: CPT

## 2023-08-27 PROCEDURE — 93010 ELECTROCARDIOGRAM REPORT: CPT | Mod: ,,, | Performed by: SPECIALIST

## 2023-08-27 PROCEDURE — 25000242 PHARM REV CODE 250 ALT 637 W/ HCPCS

## 2023-08-27 PROCEDURE — 94640 AIRWAY INHALATION TREATMENT: CPT

## 2023-08-27 PROCEDURE — 94799 UNLISTED PULMONARY SVC/PX: CPT

## 2023-08-27 PROCEDURE — 27000221 HC OXYGEN, UP TO 24 HOURS

## 2023-08-27 RX ORDER — LISINOPRIL 20 MG/1
20 TABLET ORAL DAILY
Status: DISCONTINUED | OUTPATIENT
Start: 2023-08-27 | End: 2023-08-28

## 2023-08-27 RX ORDER — CHOLESTYRAMINE 4 G/4.8G
4 POWDER, FOR SUSPENSION ORAL DAILY
Status: DISCONTINUED | OUTPATIENT
Start: 2023-08-27 | End: 2023-08-29 | Stop reason: HOSPADM

## 2023-08-27 RX ORDER — METOPROLOL SUCCINATE 50 MG/1
100 TABLET, EXTENDED RELEASE ORAL 2 TIMES DAILY
Status: DISCONTINUED | OUTPATIENT
Start: 2023-08-27 | End: 2023-08-27

## 2023-08-27 RX ORDER — DILTIAZEM HYDROCHLORIDE 120 MG/1
120 CAPSULE, COATED, EXTENDED RELEASE ORAL DAILY
Status: DISCONTINUED | OUTPATIENT
Start: 2023-08-27 | End: 2023-08-28

## 2023-08-27 RX ORDER — ISOSORBIDE MONONITRATE 60 MG/1
60 TABLET, EXTENDED RELEASE ORAL DAILY
Status: DISCONTINUED | OUTPATIENT
Start: 2023-08-28 | End: 2023-08-28

## 2023-08-27 RX ORDER — ISOSORBIDE MONONITRATE 30 MG/1
30 TABLET, EXTENDED RELEASE ORAL ONCE
Status: COMPLETED | OUTPATIENT
Start: 2023-08-27 | End: 2023-08-27

## 2023-08-27 RX ORDER — CHOLESTYRAMINE 4 G/4.8G
4 POWDER, FOR SUSPENSION ORAL DAILY
Status: ON HOLD | COMMUNITY
End: 2023-08-30 | Stop reason: HOSPADM

## 2023-08-27 RX ADMIN — IPRATROPIUM BROMIDE AND ALBUTEROL SULFATE 3 ML: 2.5; .5 SOLUTION RESPIRATORY (INHALATION) at 12:08

## 2023-08-27 RX ADMIN — ASPIRIN 81 MG: 81 TABLET, COATED ORAL at 08:08

## 2023-08-27 RX ADMIN — FUROSEMIDE 40 MG: 40 TABLET ORAL at 08:08

## 2023-08-27 RX ADMIN — DILTIAZEM HYDROCHLORIDE 120 MG: 120 CAPSULE, COATED, EXTENDED RELEASE ORAL at 08:08

## 2023-08-27 RX ADMIN — MUPIROCIN 1 G: 20 OINTMENT TOPICAL at 08:08

## 2023-08-27 RX ADMIN — LISINOPRIL 20 MG: 20 TABLET ORAL at 05:08

## 2023-08-27 RX ADMIN — IPRATROPIUM BROMIDE AND ALBUTEROL SULFATE 3 ML: 2.5; .5 SOLUTION RESPIRATORY (INHALATION) at 07:08

## 2023-08-27 RX ADMIN — ALPRAZOLAM 0.25 MG: 0.25 TABLET ORAL at 08:08

## 2023-08-27 RX ADMIN — ENOXAPARIN SODIUM 70 MG: 100 INJECTION SUBCUTANEOUS at 04:08

## 2023-08-27 RX ADMIN — RANOLAZINE 500 MG: 500 TABLET, EXTENDED RELEASE ORAL at 08:08

## 2023-08-27 RX ADMIN — METOPROLOL SUCCINATE 75 MG: 50 TABLET, FILM COATED, EXTENDED RELEASE ORAL at 08:08

## 2023-08-27 RX ADMIN — IPRATROPIUM BROMIDE AND ALBUTEROL SULFATE 3 ML: 2.5; .5 SOLUTION RESPIRATORY (INHALATION) at 08:08

## 2023-08-27 RX ADMIN — ISOSORBIDE MONONITRATE 30 MG: 30 TABLET, EXTENDED RELEASE ORAL at 01:08

## 2023-08-27 RX ADMIN — CLOPIDOGREL BISULFATE 75 MG: 75 TABLET, FILM COATED ORAL at 08:08

## 2023-08-27 RX ADMIN — PREDNISONE 40 MG: 20 TABLET ORAL at 08:08

## 2023-08-27 RX ADMIN — NITROGLYCERIN 1 INCH: 20 OINTMENT TOPICAL at 12:08

## 2023-08-27 RX ADMIN — CHOLESTYRAMINE LIGHT 4 G: 4 POWDER, FOR SUSPENSION ORAL at 03:08

## 2023-08-27 RX ADMIN — METOPROLOL SUCCINATE 50 MG: 50 TABLET, FILM COATED, EXTENDED RELEASE ORAL at 08:08

## 2023-08-27 RX ADMIN — PANTOPRAZOLE SODIUM 40 MG: 40 TABLET, DELAYED RELEASE ORAL at 05:08

## 2023-08-27 RX ADMIN — ATORVASTATIN CALCIUM 40 MG: 40 TABLET, FILM COATED ORAL at 08:08

## 2023-08-27 RX ADMIN — ISOSORBIDE MONONITRATE 30 MG: 30 TABLET, EXTENDED RELEASE ORAL at 08:08

## 2023-08-27 NOTE — PLAN OF CARE
08/27/23 0709   Patient Assessment/Suction   Level of Consciousness (AVPU) alert   Respiratory Effort Normal;Unlabored   Expansion/Accessory Muscles/Retractions no retractions;no use of accessory muscles   All Lung Fields Breath Sounds diminished   Rhythm/Pattern, Respiratory unlabored;pattern regular;depth regular   PRE-TX-O2   Device (Oxygen Therapy) nasal cannula with humidification   $ Is the patient on Low Flow Oxygen? Yes   Flow (L/min) 4   SpO2 95 %   Pulse Oximetry Type Continuous   $ Pulse Oximetry - Multiple Charge Pulse Oximetry - Multiple   Pulse (!) 55   Resp 16   Aerosol Therapy   $ Aerosol Therapy Charges Aerosol Treatment   Daily Review of Necessity (SVN) completed   Respiratory Treatment Status (SVN) given   Treatment Route (SVN) mask;oxygen   Patient Position (SVN) semi-Solorzano's   Post Treatment Assessment (SVN) breath sounds unchanged   Signs of Intolerance (SVN) none   Breath Sounds Post-Respiratory Treatment   Post-treatment Heart Rate (beats/min) 53   Post-treatment Resp Rate (breaths/min) 22   Preset CPAP/BiPAP Settings   Mode Of Delivery BiPAP S/T;Standby   $ CPAP/BiPAP Daily Charge BiPAP/CPAP Daily   Education   $ Education Bronchodilator;15 min

## 2023-08-27 NOTE — SUBJECTIVE & OBJECTIVE
"Interval History:   As per subjective     Review of Systems  Objective:     Vital Signs (Most Recent):  Temp: 97.3 °F (36.3 °C) (08/27/23 0730)  Pulse: 80 (08/27/23 1228)  Resp: (!) 44 (08/27/23 1228)  BP: (!) 133/55 (08/27/23 0853)  SpO2: 99 % (08/27/23 0800) Vital Signs (24h Range):  Temp:  [97.3 °F (36.3 °C)-98.9 °F (37.2 °C)] 97.3 °F (36.3 °C)  Pulse:  [] 80  Resp:  [13-47] 44  SpO2:  [94 %-100 %] 99 %  BP: (123-201)/() 133/55     Weight: 75 kg (165 lb 5.5 oz)  Body mass index is 29.29 kg/m².    Intake/Output Summary (Last 24 hours) at 8/27/2023 1231  Last data filed at 8/27/2023 1224  Gross per 24 hour   Intake 1550 ml   Output 750 ml   Net 800 ml         Physical Exam  Vitals and nursing note reviewed.   HENT:      Head: Normocephalic and atraumatic.   Eyes:      Conjunctiva/sclera: Conjunctivae normal.   Neck:      Vascular: No JVD.   Cardiovascular:      Heart sounds: Normal heart sounds.   Pulmonary:      Effort: Pulmonary effort is normal.      Breath sounds: Normal breath sounds.   Abdominal:      Palpations: Abdomen is soft.   Musculoskeletal:         General: Normal range of motion.   Skin:     General: Skin is warm.   Neurological:      Mental Status: She is alert and oriented to person, place, and time.   Psychiatric:         Mood and Affect: Mood normal.             Significant Labs: All pertinent labs within the past 24 hours have been reviewed.  CMP:   Recent Labs   Lab 08/25/23  2301 08/26/23  0432 08/27/23  0454    136 138   K 4.3 4.7 4.4    100 104   CO2 29 29 31*   * 165* 113*   BUN 37* 32* 33*   CREATININE 1.8* 1.5* 1.2   CALCIUM 8.3* 8.4* 8.1*   PROT 6.4 5.4* 5.2*   ALBUMIN 3.1* 2.7* 2.7*   BILITOT 0.5 0.5 0.5   ALKPHOS 97 72 59   AST 19 16 17   ALT 10 9* 11   ANIONGAP 5* 7* 3*     Cardiac Markers:   Recent Labs   Lab 08/25/23  2301   *     Coagulation: No results for input(s): "PT", "INR", "APTT" in the last 48 hours.    Significant Imaging: I have " reviewed all pertinent imaging results/findings within the past 24 hours.

## 2023-08-27 NOTE — PROGRESS NOTES
[facility] Medicine  Progress Note    Patient Name: Marisol Kerr  MRN: [id number]  Patient Class: IP- Inpatient   Admission Date: 8/25/2023  Length of Stay: 1 days  Attending Physician: Elio Grnat MD  Primary Care Provider: Khadar Dwyer MD        Subjective:     Principal Problem:<principal problem not specified>        HPI:  77-year-old female history of multivessel CAD with stent on Plavix, very severe COPD, chronic diastolic HF, chronic hypoxemic respiratory failure on oxygen, obesity, CKD 3, history of cholangitis status post ERCP with sphincterotomy and bile duct stone removal, type 2 diabetes presents to the ED via EMS with chest pain and in respiratory distress. She was recently discharged 7/29/23 after eval for chest pain.  Stress myoview with no reversible ischemia at that time.  Brilinta switched to plavix due to possible Brilinta associated SOB.  Per review of Cardiology notes, it also looks like ACEi/ARB and Lasix were held due to renal insufficiency and she was not discharged back on these. PCP restarted lasix 20mg every other day in hospital follow up visit. She tells me today is the first day she's had recurrent chest pain since discharge.  It was substernal, sharp. She took one NTG spray and was shaking the bottle near her open mouth when she accidentally administered a second dose.  She became very frightened about NTG poisoning, which caused her chest pain to become worse and thus she called 911 for advice. She reports no SOB until EMS arrived and then she developed severe SOB en route and had to be placed on bipap. Per ED MD, she arrived in respiratory distress. It wasn't clear if this was an exacerbation of her COPD or HF or both and thus she was given IV solumedrol and IV lasix. BP on arrival was 216/97. CXR with scattered interstitial markings but looks better to me than last CXR in July. EKG with NSR, No ST elevation. Troponin 10,  (last 195 7/27).  She  was ultimately started on Tridil gtt for her HTN emergency and SBP 140s at the time of my exam and respiratory state has settled and she is speaking in complete sentences on bipap. She reports compliance with her medication and is presently chest pain free.  She does note that her legs a little more swollen than usual and she thinks this started yesterday. She has put out 500cc in the pure wick cannister in response to the lasix given in the ED.      Overview/Hospital Course:  No CP or SOB   BP still elevated   She wants to go home . ISSA better. ECHO not reported   No peripheral edema . She lives alone with HH and daughter live near by    8/27  No CP now. But had CP and started nitro drip . Troponin 35 .   She had previous abnormal angio with multi vessel diseases and on medical Mx only . BP better controlled           Interval History:   As per subjective     Review of Systems  Objective:     Vital Signs (Most Recent):  Temp: 97.3 °F (36.3 °C) (08/27/23 0730)  Pulse: 80 (08/27/23 1228)  Resp: (!) 44 (08/27/23 1228)  BP: (!) 133/55 (08/27/23 0853)  SpO2: 99 % (08/27/23 0800) Vital Signs (24h Range):  Temp:  [97.3 °F (36.3 °C)-98.9 °F (37.2 °C)] 97.3 °F (36.3 °C)  Pulse:  [] 80  Resp:  [13-47] 44  SpO2:  [94 %-100 %] 99 %  BP: (123-201)/() 133/55     Weight: 75 kg (165 lb 5.5 oz)  Body mass index is 29.29 kg/m².    Intake/Output Summary (Last 24 hours) at 8/27/2023 1231  Last data filed at 8/27/2023 1224  Gross per 24 hour   Intake 1550 ml   Output 750 ml   Net 800 ml         Physical Exam  Vitals and nursing note reviewed.   HENT:      Head: Normocephalic and atraumatic.   Eyes:      Conjunctiva/sclera: Conjunctivae normal.   Neck:      Vascular: No JVD.   Cardiovascular:      Heart sounds: Normal heart sounds.   Pulmonary:      Effort: Pulmonary effort is normal.      Breath sounds: Normal breath sounds.   Abdominal:      Palpations: Abdomen is soft.   Musculoskeletal:         General: Normal range of  "motion.   Skin:     General: Skin is warm.   Neurological:      Mental Status: She is alert and oriented to person, place, and time.   Psychiatric:         Mood and Affect: Mood normal.             Significant Labs: All pertinent labs within the past 24 hours have been reviewed.  CMP:   Recent Labs   Lab 08/25/23  2301 08/26/23  0432 08/27/23  0454    136 138   K 4.3 4.7 4.4    100 104   CO2 29 29 31*   * 165* 113*   BUN 37* 32* 33*   CREATININE 1.8* 1.5* 1.2   CALCIUM 8.3* 8.4* 8.1*   PROT 6.4 5.4* 5.2*   ALBUMIN 3.1* 2.7* 2.7*   BILITOT 0.5 0.5 0.5   ALKPHOS 97 72 59   AST 19 16 17   ALT 10 9* 11   ANIONGAP 5* 7* 3*     Cardiac Markers:   Recent Labs   Lab 08/25/23  2301   *     Coagulation: No results for input(s): "PT", "INR", "APTT" in the last 48 hours.    Significant Imaging: I have reviewed all pertinent imaging results/findings within the past 24 hours.      Assessment/Plan:      Active Hospital Problems    Diagnosis    Hypertensive emergency    Acute on chronic respiratory failure with hypoxia and hypercapnia    COPD exacerbation    Acute on chronic heart failure with preserved ejection fraction (HFpEF)    Chronic diastolic congestive heart failure    Chest pain    COPD/emphysema    Chronic kidney disease, stage 4 (severe)    CAD (coronary artery disease)    DM type 2, controlled, with complication    Essential hypertension, benign       Plan:    Wean nitro drip and increased monotrate to 60 mg daily    She use 2 L at home and at baseline    continue PO lasix 40 mg daily   PO steroid -For possible COPD exacerbation  Transfer out of ICU when nitro drip is off    EKG is non ischemic however she had multivessel disease and revascularization was not done in [facility] because of not candidate   -ISS. Accuchecks.  If needed may need to transfer to [facility] [place] [place] for high risk PCI Dr Chappell       VTE Risk Mitigation (From admission, onward)         Ordered     enoxaparin injection 70 mg  " Daily         08/26/23 2218     IP VTE HIGH RISK PATIENT  Once         08/26/23 0236     Place sequential compression device  Until discontinued         08/26/23 0236                Discharge Planning   LUZ:      Code Status: Full Code   Is the patient medically ready for discharge?:     Reason for patient still in hospital (select all that apply): Treatment  Discharge Plan A: Home Health            Critical care time spent on the evaluation and treatment of severe organ dysfunction, review of pertinent labs and imaging studies, discussions with consulting providers and discussions with patient/family: 33 minutes.      Elio Grant MD  Department of Hospital Medicine   [facility]

## 2023-08-27 NOTE — PLAN OF CARE
Pt AAOx4. Sinus mila on the monitor. BP fluctuates from SBP in the 120s to SBP in the 190s. Episode of chest pain at 2135, see previous note and chart for further information. Nitroglycerin drip maintained at 10mcg/hr. BP increased this AM, contacted Dr. Mcmullen. Lisinopril dose pushed forward. 3L NC with humidification continued. External catheter maintained with UO of 300mL.     Plan of care reviewed with pt. Pt verbalized understanding. Bed locked in the lowest position. Call light and personal belongings within reach, dentures and hearing aid at bedside.

## 2023-08-27 NOTE — RESPIRATORY THERAPY
08/26/23 1952   Patient Assessment/Suction   Level of Consciousness (AVPU) alert   Respiratory Effort Unlabored   Expansion/Accessory Muscles/Retractions no use of accessory muscles   All Lung Fields Breath Sounds clear;diminished   PRE-TX-O2   Device (Oxygen Therapy) nasal cannula with humidification   $ Is the patient on Low Flow Oxygen? Yes   Flow (L/min) 3   SpO2 100 %   Pulse Oximetry Type Continuous   $ Pulse Oximetry - Multiple Charge Pulse Oximetry - Multiple   Pulse 75   Resp 20   Aerosol Therapy   $ Aerosol Therapy Charges Aerosol Treatment   Daily Review of Necessity (SVN) completed   Respiratory Treatment Status (SVN) given   Treatment Route (SVN) mask;oxygen   Patient Position (SVN) semi-Solorzano's   Post Treatment Assessment (SVN) breath sounds unchanged   Signs of Intolerance (SVN) none   Breath Sounds Post-Respiratory Treatment   Throughout All Fields Post-Treatment All Fields   Throughout All Fields Post-Treatment no change   Post-treatment Heart Rate (beats/min) 79   Post-treatment Resp Rate (breaths/min) 20   Education   $ Education BiPAP;Bronchodilator;15 min   Respiratory Evaluation   $ Care Plan Tech Time 15 min   $ Eval/Re-eval Charges Re-evaluation

## 2023-08-27 NOTE — CONSULTS
[facility]  Cardiology  Progress Note    Patient Name: Marisol Krer  MRN: [id number]  Admission Date: 8/25/2023  Hospital Length of Stay: 1 days  Code Status: Full Code   Attending Physician: Elio Grant MD   Primary Care Physician: Khadar Dwyer MD  Expected Discharge Date:   Principal Problem:<principal problem not specified>    Subjective:     Hospital Course:  Patient had severe chest pain last night associated with blood pressure elevation  She was started on Tridil drip  Interval History:  Reviewed her angiogram from 10/22 she had significant disease in a nondominant right coronary and a severe calcified stenotic lesion in the proximal dominant circumflex and some LAD disease  She did not undergo stenting of the circumflex at [facility] because of her breathing problems and chronic kidney disease at that time  This morning she is off of Tridil blood pressure is well-maintained heart rate is approximately 75    ROS  Objective:     Vital Signs (Most Recent):  Temp: 97.5 °F (36.4 °C) (08/27/23 1145)  Pulse: 69 (08/27/23 1245)  Resp: (!) 21 (08/27/23 1245)  BP: 138/65 (08/27/23 1245)  SpO2: 100 % (08/27/23 1245) Vital Signs (24h Range):  Temp:  [97.3 °F (36.3 °C)-98.9 °F (37.2 °C)] 97.5 °F (36.4 °C)  Pulse:  [] 69  Resp:  [13-47] 21  SpO2:  [94 %-100 %] 100 %  BP: (123-201)/() 138/65     Weight: 75 kg (165 lb 5.5 oz)  Body mass index is 29.29 kg/m².    SpO2: 100 %         Intake/Output Summary (Last 24 hours) at 8/27/2023 1314  Last data filed at 8/27/2023 1224  Gross per 24 hour   Intake 1550 ml   Output 750 ml   Net 800 ml       Lines/Drains/Airways       Drain  Duration             Female External Urinary Catheter 08/26/23 0701 1 day              Peripheral Intravenous Line  Duration                  Peripheral IV - Single Lumen 08/25/23 2223 18 G Anterior;Proximal;Right Forearm 1 day         Peripheral IV - Single Lumen 08/25/23 2243 20 G Anterior;Left Upper Arm 1 day               "      Physical Exam lungs rhonchi wheezes  Cardiac S4    Significant Labs: CMP   Recent Labs   Lab 08/25/23  2301 08/26/23  0432 08/27/23  0454    136 138   K 4.3 4.7 4.4    100 104   CO2 29 29 31*   * 165* 113*   BUN 37* 32* 33*   CREATININE 1.8* 1.5* 1.2   CALCIUM 8.3* 8.4* 8.1*   PROT 6.4 5.4* 5.2*   ALBUMIN 3.1* 2.7* 2.7*   BILITOT 0.5 0.5 0.5   ALKPHOS 97 72 59   AST 19 16 17   ALT 10 9* 11   ANIONGAP 5* 7* 3*   , CBC   Recent Labs   Lab 08/25/23 2301 08/26/23  0123 08/26/23  0432 08/27/23  0454   WBC 8.62  --  8.62 8.31   HGB 9.8*  --  9.3* 8.4*   HCT 32.7*   < > 30.5* 27.3*     --  175 157    < > = values in this interval not displayed.   , and Troponin No results for input(s): "TROPONINI" in the last 48 hours.    Significant Imaging:   Assessment and Plan:   Patient continues to have spikes in blood pressure and severe angina type pains requiring IV nitroglycerin  I sent a note to Dr. Westley Chappell over at [facility] to see if he will reconsider trying to stent the circumflex which I think is causing a lot of her discomfort  In the meantime she can go to the floor on the current meds plus increase metoprolol to reduced cardiac work and hopefully reduce angina  Brief HPI:     Active Diagnoses:    Diagnosis Date Noted POA    Hypertensive emergency [I16.1] 08/26/2023 Yes    Acute on chronic respiratory failure with hypoxia and hypercapnia [J96.21, J96.22] 05/26/2023 Yes    COPD exacerbation [J44.1] 01/09/2023 Yes    Acute on chronic heart failure with preserved ejection fraction (HFpEF) [I50.33] 06/30/2022 Yes    Chronic diastolic congestive heart failure [I50.32] 09/17/2019 Yes    Chest pain [R07.9] 09/17/2019 Unknown    COPD/emphysema [J44.9] 01/16/2019 Yes     Chronic    Chronic kidney disease, stage 4 (severe) [N18.4] 10/08/2014 Yes    CAD (coronary artery disease) [I25.10] 06/26/2014 Yes    DM type 2, controlled, with complication [E11.8] 06/03/2013 Yes    Essential " hypertension, benign [I10] 04/09/2013 Yes      Problems Resolved During this Admission:       VTE Risk Mitigation (From admission, onward)           Ordered     enoxaparin injection 70 mg  Daily         08/26/23 2218     IP VTE HIGH RISK PATIENT  Once         08/26/23 0236     Place sequential compression device  Until discontinued         08/26/23 0236                    Khadar Burt MD  Cardiology  [facility]

## 2023-08-27 NOTE — PLAN OF CARE
Problem: Adult Inpatient Plan of Care  Goal: Plan of Care Review  Outcome: Ongoing, Progressing  Goal: Optimal Comfort and Wellbeing  Outcome: Ongoing, Progressing     Problem: Diabetes Comorbidity  Goal: Blood Glucose Level Within Targeted Range  Outcome: Ongoing, Progressing     Problem: Skin Injury Risk Increased  Goal: Skin Health and Integrity  Outcome: Ongoing, Progressing     Problem: Adjustment to Illness COPD (Chronic Obstructive Pulmonary Disease)  Goal: Optimal Chronic Illness Coping  Outcome: Ongoing, Progressing     Problem: Functional Ability Impaired COPD (Chronic Obstructive Pulmonary Disease)  Goal: Optimal Level of Functional [place]  Outcome: Ongoing, Progressing     Problem: Adjustment to Illness (Heart Failure)  Goal: Optimal Coping  Outcome: Ongoing, Progressing   Pt has been chest pain free through out the day. Tridil drip turned off. Cardiology talked to daughter and pt about getting a stent to circumflex at [facility]. Both are in agreement. Cardiologist sent a message to Dr Chappell at [facility] to reevaluate if pt is suitable for stent. Remains on O2 at 4 liters. [place] in use.

## 2023-08-27 NOTE — NURSING
2135- Pt c/o of chest pain 8 out of 10. Pt was due for scheduled nitroglycerin paste, medication applied. No improvement of symptoms. EKG obtained. Pt c/o nausea. PRN Zofran administered.     2150-Called Dr. Burt. New orders received. Updated Dr. Mcmullen on new orders and pt's current condition. SL Nitroglycerin given x1.     2209-Morphine given. Nitroglycerin drip started at 10mcg/hr per Dr. Burt.     2220- Pt states chest pain now 2 out of 10.     2300- Pt states chest pain is fully relieved.

## 2023-08-28 VITALS
HEIGHT: 63 IN | RESPIRATION RATE: 28 BRPM | HEART RATE: 51 BPM | WEIGHT: 170.19 LBS | BODY MASS INDEX: 30.16 KG/M2 | TEMPERATURE: 99 F | SYSTOLIC BLOOD PRESSURE: 107 MMHG | DIASTOLIC BLOOD PRESSURE: 52 MMHG | OXYGEN SATURATION: 100 %

## 2023-08-28 LAB
ALBUMIN SERPL BCP-MCNC: 2.6 G/DL (ref 3.5–5.2)
ALP SERPL-CCNC: 63 U/L (ref 55–135)
ALT SERPL W/O P-5'-P-CCNC: 11 U/L (ref 10–44)
ANION GAP SERPL CALC-SCNC: 5 MMOL/L (ref 8–16)
AST SERPL-CCNC: 14 U/L (ref 10–40)
BASOPHILS # BLD AUTO: 0.02 K/UL (ref 0–0.2)
BASOPHILS NFR BLD: 0.2 % (ref 0–1.9)
BILIRUB SERPL-MCNC: 0.7 MG/DL (ref 0.1–1)
BUN SERPL-MCNC: 49 MG/DL (ref 8–23)
CALCIUM SERPL-MCNC: 8.1 MG/DL (ref 8.7–10.5)
CHLORIDE SERPL-SCNC: 102 MMOL/L (ref 95–110)
CO2 SERPL-SCNC: 29 MMOL/L (ref 23–29)
CREAT SERPL-MCNC: 1.6 MG/DL (ref 0.5–1.4)
DIFFERENTIAL METHOD: ABNORMAL
EOSINOPHIL # BLD AUTO: 0 K/UL (ref 0–0.5)
EOSINOPHIL NFR BLD: 0.3 % (ref 0–8)
ERYTHROCYTE [DISTWIDTH] IN BLOOD BY AUTOMATED COUNT: 14.7 % (ref 11.5–14.5)
EST. GFR  (NO RACE VARIABLE): 33.2 ML/MIN/1.73 M^2
GLUCOSE SERPL-MCNC: 98 MG/DL (ref 70–110)
HCT VFR BLD AUTO: 26.7 % (ref 37–48.5)
HGB BLD-MCNC: 8 G/DL (ref 12–16)
IMM GRANULOCYTES # BLD AUTO: 0.03 K/UL (ref 0–0.04)
IMM GRANULOCYTES NFR BLD AUTO: 0.3 % (ref 0–0.5)
LYMPHOCYTES # BLD AUTO: 1.4 K/UL (ref 1–4.8)
LYMPHOCYTES NFR BLD: 14.9 % (ref 18–48)
MAGNESIUM SERPL-MCNC: 2.1 MG/DL (ref 1.6–2.6)
MCH RBC QN AUTO: 28.7 PG (ref 27–31)
MCHC RBC AUTO-ENTMCNC: 30 G/DL (ref 32–36)
MCV RBC AUTO: 96 FL (ref 82–98)
MONOCYTES # BLD AUTO: 0.6 K/UL (ref 0.3–1)
MONOCYTES NFR BLD: 6.6 % (ref 4–15)
NEUTROPHILS # BLD AUTO: 7.2 K/UL (ref 1.8–7.7)
NEUTROPHILS NFR BLD: 77.7 % (ref 38–73)
NRBC BLD-RTO: 0 /100 WBC
PLATELET # BLD AUTO: 152 K/UL (ref 150–450)
PMV BLD AUTO: 11.9 FL (ref 9.2–12.9)
POTASSIUM SERPL-SCNC: 4.9 MMOL/L (ref 3.5–5.1)
PROT SERPL-MCNC: 5.1 G/DL (ref 6–8.4)
RBC # BLD AUTO: 2.79 M/UL (ref 4–5.4)
SODIUM SERPL-SCNC: 136 MMOL/L (ref 136–145)
WBC # BLD AUTO: 9.3 K/UL (ref 3.9–12.7)

## 2023-08-28 PROCEDURE — 25000242 PHARM REV CODE 250 ALT 637 W/ HCPCS

## 2023-08-28 PROCEDURE — 80053 COMPREHEN METABOLIC PANEL: CPT | Performed by: INTERNAL MEDICINE

## 2023-08-28 PROCEDURE — 25000003 PHARM REV CODE 250: Performed by: SPECIALIST

## 2023-08-28 PROCEDURE — 63600175 PHARM REV CODE 636 W HCPCS: Performed by: SPECIALIST

## 2023-08-28 PROCEDURE — 63600175 PHARM REV CODE 636 W HCPCS: Performed by: INTERNAL MEDICINE

## 2023-08-28 PROCEDURE — 25000003 PHARM REV CODE 250: Performed by: INTERNAL MEDICINE

## 2023-08-28 PROCEDURE — 94799 UNLISTED PULMONARY SVC/PX: CPT

## 2023-08-28 PROCEDURE — 25000242 PHARM REV CODE 250 ALT 637 W/ HCPCS: Performed by: INTERNAL MEDICINE

## 2023-08-28 PROCEDURE — 99233 SBSQ HOSP IP/OBS HIGH 50: CPT | Mod: ,,, | Performed by: GENERAL PRACTICE

## 2023-08-28 PROCEDURE — 85025 COMPLETE CBC W/AUTO DIFF WBC: CPT | Performed by: INTERNAL MEDICINE

## 2023-08-28 PROCEDURE — 27000221 HC OXYGEN, UP TO 24 HOURS

## 2023-08-28 PROCEDURE — 25000003 PHARM REV CODE 250: Performed by: NURSE PRACTITIONER

## 2023-08-28 PROCEDURE — 83735 ASSAY OF MAGNESIUM: CPT | Performed by: INTERNAL MEDICINE

## 2023-08-28 PROCEDURE — 94660 CPAP INITIATION&MGMT: CPT

## 2023-08-28 PROCEDURE — 94761 N-INVAS EAR/PLS OXIMETRY MLT: CPT

## 2023-08-28 PROCEDURE — 94640 AIRWAY INHALATION TREATMENT: CPT

## 2023-08-28 PROCEDURE — 99900031 HC PATIENT EDUCATION (STAT)

## 2023-08-28 PROCEDURE — 36415 COLL VENOUS BLD VENIPUNCTURE: CPT | Performed by: INTERNAL MEDICINE

## 2023-08-28 PROCEDURE — 99900035 HC TECH TIME PER 15 MIN (STAT)

## 2023-08-28 PROCEDURE — 99233 PR SUBSEQUENT HOSPITAL CARE,LEVL III: ICD-10-PCS | Mod: ,,, | Performed by: GENERAL PRACTICE

## 2023-08-28 RX ORDER — AMLODIPINE BESYLATE 5 MG/1
10 TABLET ORAL DAILY
Status: DISCONTINUED | OUTPATIENT
Start: 2023-08-28 | End: 2023-08-29 | Stop reason: HOSPADM

## 2023-08-28 RX ORDER — LISINOPRIL 10 MG/1
10 TABLET ORAL DAILY
Status: DISCONTINUED | OUTPATIENT
Start: 2023-08-28 | End: 2023-08-28

## 2023-08-28 RX ORDER — AMLODIPINE BESYLATE 10 MG/1
10 TABLET ORAL DAILY
Qty: 90 TABLET | Refills: 0 | OUTPATIENT
Start: 2023-08-29 | End: 2023-11-27

## 2023-08-28 RX ORDER — ISOSORBIDE MONONITRATE 120 MG/1
120 TABLET, EXTENDED RELEASE ORAL DAILY
Qty: 90 TABLET | Refills: 0 | OUTPATIENT
Start: 2023-08-29 | End: 2023-11-27

## 2023-08-28 RX ORDER — ASPIRIN 81 MG/1
81 TABLET ORAL DAILY
Qty: 90 TABLET | Refills: 0 | OUTPATIENT
Start: 2023-08-29 | End: 2023-11-27

## 2023-08-28 RX ORDER — METOPROLOL SUCCINATE 25 MG/1
75 TABLET, EXTENDED RELEASE ORAL 2 TIMES DAILY
Qty: 280 TABLET | Refills: 0 | OUTPATIENT
Start: 2023-08-28 | End: 2023-10-14

## 2023-08-28 RX ORDER — ISOSORBIDE MONONITRATE 60 MG/1
120 TABLET, EXTENDED RELEASE ORAL DAILY
Status: DISCONTINUED | OUTPATIENT
Start: 2023-08-29 | End: 2023-08-29 | Stop reason: HOSPADM

## 2023-08-28 RX ADMIN — IPRATROPIUM BROMIDE AND ALBUTEROL SULFATE 3 ML: 2.5; .5 SOLUTION RESPIRATORY (INHALATION) at 07:08

## 2023-08-28 RX ADMIN — DILTIAZEM HYDROCHLORIDE 120 MG: 120 CAPSULE, COATED, EXTENDED RELEASE ORAL at 09:08

## 2023-08-28 RX ADMIN — CLOPIDOGREL BISULFATE 75 MG: 75 TABLET, FILM COATED ORAL at 09:08

## 2023-08-28 RX ADMIN — MORPHINE SULFATE 2 MG: 2 INJECTION, SOLUTION INTRAMUSCULAR; INTRAVENOUS at 10:08

## 2023-08-28 RX ADMIN — METOPROLOL SUCCINATE 75 MG: 50 TABLET, FILM COATED, EXTENDED RELEASE ORAL at 09:08

## 2023-08-28 RX ADMIN — MORPHINE SULFATE 2 MG: 2 INJECTION, SOLUTION INTRAMUSCULAR; INTRAVENOUS at 11:08

## 2023-08-28 RX ADMIN — ENOXAPARIN SODIUM 70 MG: 100 INJECTION SUBCUTANEOUS at 04:08

## 2023-08-28 RX ADMIN — PANTOPRAZOLE SODIUM 40 MG: 40 TABLET, DELAYED RELEASE ORAL at 05:08

## 2023-08-28 RX ADMIN — AMLODIPINE BESYLATE 10 MG: 5 TABLET ORAL at 03:08

## 2023-08-28 RX ADMIN — CHOLESTYRAMINE LIGHT 4 G: 4 POWDER, FOR SUSPENSION ORAL at 09:08

## 2023-08-28 RX ADMIN — ATORVASTATIN CALCIUM 40 MG: 40 TABLET, FILM COATED ORAL at 08:08

## 2023-08-28 RX ADMIN — MUPIROCIN 1 G: 20 OINTMENT TOPICAL at 08:08

## 2023-08-28 RX ADMIN — IPRATROPIUM BROMIDE AND ALBUTEROL SULFATE 3 ML: 2.5; .5 SOLUTION RESPIRATORY (INHALATION) at 11:08

## 2023-08-28 RX ADMIN — ALPRAZOLAM 0.25 MG: 0.25 TABLET ORAL at 08:08

## 2023-08-28 RX ADMIN — ISOSORBIDE MONONITRATE 60 MG: 60 TABLET, EXTENDED RELEASE ORAL at 09:08

## 2023-08-28 RX ADMIN — METOPROLOL SUCCINATE 75 MG: 50 TABLET, FILM COATED, EXTENDED RELEASE ORAL at 08:08

## 2023-08-28 RX ADMIN — RANOLAZINE 500 MG: 500 TABLET, EXTENDED RELEASE ORAL at 09:08

## 2023-08-28 RX ADMIN — ASPIRIN 81 MG: 81 TABLET, COATED ORAL at 09:08

## 2023-08-28 RX ADMIN — PREDNISONE 40 MG: 20 TABLET ORAL at 09:08

## 2023-08-28 RX ADMIN — RANOLAZINE 500 MG: 500 TABLET, EXTENDED RELEASE ORAL at 08:08

## 2023-08-28 RX ADMIN — MUPIROCIN 1 G: 20 OINTMENT TOPICAL at 09:08

## 2023-08-28 RX ADMIN — FUROSEMIDE 40 MG: 40 TABLET ORAL at 09:08

## 2023-08-28 NOTE — ASSESSMENT & PLAN NOTE
Patient presented to [facility] for chest pain and respiratory distress. She reports no SOB until EMS arrived and then she developed severe SOB en route and had to be placed on bipap. Per ED MD, she arrived in respiratory distress. Was started on tridill gtt and BiPAP due to hypertensive emergency. Cardiology was consulted at outside facility. There was a discussion to with Dr. Thornton to Dr. Chappell to transfer over to [facility] for stenting the circumflex    Etiology: COPD vs Acute on chronic HF    Plan:   - PO steroids for possible COPD exacerbation.  - PCI

## 2023-08-28 NOTE — NURSING
Pt had a spell of SOB. No ativan but morphine 2 mg given IVP. After a few minutes and a breathing treatment, pt's breathing leveled out. Denied chest pain during episode.

## 2023-08-28 NOTE — HPI
This is a 77 year old lady with severe multivessel CAD not CABG candidate, COPD on home 4L NC, HFpEF (Ef61%), CKD3, and T2DM who presented at [facility] with chest pain and respiratory distress. Patient had multiple prior admission to [facility] for angina and CHF exacerbations. Patient had taken multiple NTG for chest pain and was noted to have been worried about overdosing on the NTG for which she was anxious and BP was spiking during the anxious episodes to the 216s systolic which came down with Tridil gtt but patient was still complaining of intermittent chest pain. Cardiology evaluated patient at [place] and was thinking that patient's known Lcx stenosis was the culprit of the chest pain and patient was [place] [facility] [place].     Upon chart review, patient was noted to be on BiPAP for worsening shortness of breath. Prior cath on 2/10/22 showed:    RPAV lesion was 100% stenosed.  RPDA lesion was 100% stenosed.  Prox Cx to Mid Cx lesion was 80% stenosed.  Dist Cx lesion was 70% stenosed.  1st LPL lesion was 90% stenosed.  Prox RCA-2 lesion was 70% stenosed.  Prox RCA-1 lesion was 90% stenosed.  Mid LAD-2 lesion was 60% stenosed.    Echo from OSH on 8/26/23 showed EF of 61% with CVP of 3 and moderately dilated LA.

## 2023-08-28 NOTE — NURSING
Contacted Dr. Mcmullen to clarify transfer orders. Due to the earlier chest pain, Dr. Mcmullen said to leave the patient in the ICU overnight and have the morning Hospitalist reevaluate.

## 2023-08-28 NOTE — NURSING
Spoke to Dr Burt. He will reach out to Dr Chappell again about the need for  a stent in th e circumflex.  Dr Rodriges is taking over pt's case for this week at [facility].

## 2023-08-28 NOTE — NURSING
Received call from Georgie at transfer center. Pt going to room 325. Changed pt's gricel perez, took pictures of sacral and heels and calves. Partial bath given. Called daughter, Marisol< at [phone] to let her know her mother was being transferred to main campus.

## 2023-08-28 NOTE — PLAN OF CARE
Pt AAOx4. Sinus Erich on the monitor. No c/o chest pain at this time. BP stable at this time. 4L NC with humidification maintained. External catheter in place with 250mL UO. 1 unmeasured UO, external catheter and brief changed.     Plan of care reviewed with pt. Pt verbalized understanding. Bed locked in the lowest position. Call light and personal belongings within reach.

## 2023-08-28 NOTE — CARE UPDATE
Continue breathing tx   08/28/23 0702   Patient Assessment/Suction   Level of Consciousness (AVPU) alert   Respiratory Effort Normal;Unlabored   Expansion/Accessory Muscles/Retractions no use of accessory muscles;expansion symmetric   All Lung Fields Breath Sounds diminished   Rhythm/Pattern, Respiratory unlabored;depth regular;no shortness of breath reported   Cough Frequency no cough   PRE-TX-O2   Device (Oxygen Therapy) nasal cannula with humidification   Flow (L/min) 3   SpO2 99 %   Pulse Oximetry Type Continuous   $ Pulse Oximetry - Multiple Charge Pulse Oximetry - Multiple   Pulse (!) 49   Resp 18   Aerosol Therapy   $ Aerosol Therapy Charges Aerosol Treatment   Daily Review of Necessity (SVN) completed   Respiratory Treatment Status (SVN) given   Treatment Route (SVN) mask;air   Patient Position (SVN) semi-Solorzano's   Post Treatment Assessment (SVN) increased aeration   Signs of Intolerance (SVN) none   Breath Sounds Post-Respiratory Treatment   Throughout All Fields Post-Treatment All Fields   Throughout All Fields Post-Treatment aeration increased   Post-treatment Heart Rate (beats/min) 48   Post-treatment Resp Rate (breaths/min) 20   Preset CPAP/BiPAP Settings   Mode Of Delivery BiPAP S/T;Standby   $ CPAP/BiPAP Daily Charge BiPAP/CPAP Daily   Education   $ Education Bronchodilator;15 min   Respiratory Evaluation   $ Care Plan Tech Time 15 min   $ Eval/Re-eval Charges Re-evaluation

## 2023-08-28 NOTE — ASSESSMENT & PLAN NOTE
Last Echo 8/26:  Summary    Left Ventricle: The left ventricle is normal in size. Moderately increased ventricular mass. Moderately increased wall thickness. There is moderate concentrict hypertrophy. Normal wall motion. There is normal systolic function.  EF 61% There is normal diastolic function.    Left Atrium: Left atrium is moderately dilated.    Right Ventricle: Normal right ventricular cavity size. Wall thickness is normal. Right ventricle wall motion  is normal. Systolic function is normal.    Mitral Valve: Mildly thickened anterior leaflet. Mild mitral annular calcification.    IVC/SVC: Normal venous pressure at 3 mmHg.    Pericardium: There is an effusion.    Limited echo; no gross pulmonary hypertension but poor visualization of tricuspid signal    No tissue Doppler performed to assess mean left atrial pressure      Per cardiology recs from outside facility     Plan:  - Change Cardizem to Amlodipine 10   - Increase Imdur to 120 mg daily  - Continue ASA and Plavix  - She is allergic to contrast media  - She will need to be premedicated and would wait til her kidneys are optimal prior to revascularization.  - Appreciate Dr Chappell.   - Dr. Chappell has agreed to take patient to [facility] [place] to fix LCX artery

## 2023-08-28 NOTE — DISCHARGE SUMMARY
[facility] Medicine  Discharge Summary      Patient Name: Marisol Kerr  MRN: [id number]  YURY: [id number]  Patient Class: IP- Inpatient  Admission Date: 8/25/2023  Hospital Length of Stay: 2 days  Discharge Date and Time:  08/28/2023 6:14 PM  Attending Physician: Elio Grant MD   Discharging Provider: Elio Grant MD  Primary Care Provider: Khadar Dwyer MD    Primary Care Team: Networked reference to record PCT     HPI:   77-year-old female history of multivessel CAD with stent on Plavix, very severe COPD, chronic diastolic HF, chronic hypoxemic respiratory failure on oxygen, obesity, CKD 3, history of cholangitis status post ERCP with sphincterotomy and bile duct stone removal, type 2 diabetes presents to the ED via EMS with chest pain and in respiratory distress. She was recently discharged 7/29/23 after eval for chest pain.  Stress myoview with no reversible ischemia at that time.  Brilinta switched to plavix due to possible Brilinta associated SOB.  Per review of Cardiology notes, it also looks like ACEi/ARB and Lasix were held due to renal insufficiency and she was not discharged back on these. PCP restarted lasix 20mg every other day in hospital follow up visit. She tells me today is the first day she's had recurrent chest pain since discharge.  It was substernal, sharp. She took one NTG spray and was shaking the bottle near her open mouth when she accidentally administered a second dose.  She became very frightened about NTG poisoning, which caused her chest pain to become worse and thus she called 911 for advice. She reports no SOB until EMS arrived and then she developed severe SOB en route and had to be placed on bipap. Per ED MD, she arrived in respiratory distress. It wasn't clear if this was an exacerbation of her COPD or HF or both and thus she was given IV solumedrol and IV lasix. BP on arrival was 216/97. CXR with scattered interstitial markings but looks better to me  than last CXR in July. EKG with NSR, No ST elevation. Troponin 10,  (last 195 7/27).  She was ultimately started on Tridil gtt for her HTN emergency and SBP 140s at the time of my exam and respiratory state has settled and she is speaking in complete sentences on bipap. She reports compliance with her medication and is presently chest pain free.  She does note that her legs a little more swollen than usual and she thinks this started yesterday. She has put out 500cc in the pure wick cannister in response to the lasix given in the ED.      * No surgery found *      Hospital Course:   77-year-old female history of multivessel CAD with stent on Plavix, very severe COPD, chronic diastolic HF, chronic hypoxemic respiratory failure on oxygen, obesity, CKD 3, history of cholangitis status post ERCP with sphincterotomy and bile duct stone removal, type 2 diabetes presents to the ED via EMS with chest pain and in respiratory distress and HTN urgency and later CP.    She was recently discharged 7/29/23 after eval for chest pain.  Stress myoview with no reversible ischemia at that time.  Brilinta switched to plavix due to possible Brilinta associated SOB.  ACEi/ARB and Lasix were held due to renal insufficiency as well.    She was treated for Pulmonary edema and also for possible COPD and her BP at admission was 216/97.   CXR with scattered interstitial markings .   She was started on Tridil gtt for her HTN emergency and SBP 140s and later improved , Later BP increased and CP recurrent and cardio reviewed and because of her pre existing difficult coronary pathology ( know for long time and following up with dr balderas for high risk PCI , she is not candidate for CABG) and eventually transferred to [facility] under care of dr Balderas and transferred in stable condition .    Her BP is stable and CP free at the time of DC .She got short course of steroid . Replaced cardizem with amlodipine and increased imdur to 120 daily  . Aspirin added to plavix  and lasix            Goals of Care Treatment Preferences:  Code Status: Full Code          What is most important right now is to focus on extending life as long as possible, even it it means sacrificing quality.  Accordingly, we have decided that the best plan to meet the patient's goals includes continuing with treatment.      Consults:   Consults (From admission, onward)        Status Ordering Provider     Inpatient consult to Registered Dietitian/Nutritionist  Once        Provider:  (Not yet assigned)    Acknowledged RICHARD HENLEY     IP consult to case management  Once        Provider:  (Not yet assigned)    Completed RICHARD HENLEY     Inpatient consult to Cardiology  Once        Provider:  Khadar Burt MD    Acknowledged NAHID PUGH          No new Assessment & Plan notes have been filed under this hospital service since the last note was generated.  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    Specialty:  Interventional Cardiology  Contact information:  [address]  [facility] [id number]  [phone]                       Patient Instructions:      Ambulatory referral/consult to Outpatient Case Management   Referral Priority: Routine Referral Type: Consultation   Referral Reason: Specialty Services Required   Number of Visits Requested: 1       Significant Diagnostic Studies: Labs:   CMP   Recent Labs   Lab 08/27/23 0454 08/28/23  0408    136   K 4.4 4.9    102   CO2 31* 29   * 98   BUN 33* 49*   CREATININE 1.2 1.6*   CALCIUM 8.1* 8.1*   PROT 5.2* 5.1*   ALBUMIN 2.7* 2.6*   BILITOT 0.5 0.7   ALKPHOS 59 63   AST 17 14   ALT 11 11   ANIONGAP 3* 5*    and CBC   Recent Labs   Lab 08/27/23 0454 08/28/23  0408   WBC 8.31 9.30   HGB 8.4* 8.0*   HCT 27.3* 26.7*    152       Pending Diagnostic Studies:     None         Medications:  Reconciled Home Medications:      Medication List      ASK your doctor about these medications    albuterol 90 mcg/actuation inhaler  Commonly known as: VENTOLIN HFA  Inhale 2 puffs into the lungs every 6 (six) hours as needed for Wheezing or Shortness of Breath. Rescue     * albuterol-ipratropium 2.5 mg-0.5 mg/3 mL nebulizer solution  Commonly known as: DUO-NEB  Take 3 mLs by nebulization every 4 (four) hours as needed for Wheezing or Shortness of Breath. Rescue     * CombiVENT RESPIMAT  mcg/actuation inhaler  Generic drug: ipratropium-albuteroL  Inhale 2 puffs into the lungs every 4 (four) hours as needed for Shortness of Breath. Rescue     allopurinoL 100 MG tablet  Commonly known as: ZYLOPRIM  Take 0.5 tablets (50 mg total) by mouth once daily.     ALPRAZolam 0.25 MG tablet  Commonly known as: XANAX  Take 1 tablet (0.25 mg total) by mouth every evening.     atorvastatin 40 MG tablet  Commonly known as: LIPITOR  Take 1 tablet (40 mg total) by mouth once daily.     CHOLESTYRAMINE LIGHT 4 gram Pwpk  Generic drug: cholestyramine-aspartame  Take 4 g by mouth  once daily.     clopidogreL 75 mg tablet  Commonly known as: PLAVIX  Take 1 tablet (75 mg total) by mouth once daily.     coenzyme Q10 100 mg capsule  Take 100 mg by mouth every morning.     colchicine (gout) 0.6 mg tablet  Commonly known as: COLCRYS  Take 0.3 mg by mouth every other day.     ergocalciferol 50,000 unit Cap  Commonly known as: VITAMIN D2  Take 1 capsule (50,000 Units total) by mouth every 7 days.     ferrous sulfate 325 (65 FE) MG EC tablet  Take 325 mg by mouth once daily.     fish oil-omega-3 fatty acids 300-1,000 mg capsule  Take 2 capsules by mouth every morning.     fluticasone-salmeterol 250-50 mcg/dose 250-50 mcg/dose diskus inhaler  Commonly known as: ADVAIR  Inhale 1 puff into the lungs 2 (two) times a day.     furosemide 20 MG tablet  Commonly known as: LASIX  Take 1 tablet (20 mg total) by mouth every other day. TAKE WITH POTASSIUM     INCRUSE ELLIPTA 62.5 mcg/actuation inhalation capsule  Generic drug: umeclidinium  Inhale 62.5 mcg into the lungs every morning. Controller     isosorbide mononitrate 30 MG 24 hr tablet  Commonly known as: IMDUR  Take 1 tablet (30 mg total) by mouth once daily.     ketoconazole 2 % cream  Commonly known as: NIZORAL  Apply 1 application  topically 2 (two) times daily.     magnesium oxide 400 mg (241.3 mg magnesium) tablet  Commonly known as: MAG-OX  Take 1 tablet by mouth 2 (two) times daily.     metoprolol tartrate 75 mg Tab  Take 1 tablet (75 mg total) by mouth 3 (three) times daily.     mupirocin 2 % ointment  Commonly known as: BACTROBAN  Apply topically 2 (two) times daily.     nitroGLYCERIN 0.4 MG/DOSE TL SPRY 400 mcg/spray spray  Commonly known as: NITROLINGUAL  Place 1 spray under the tongue every 5 (five) minutes as needed for Chest pain (max of 3 doses).     pantoprazole 40 MG tablet  Commonly known as: PROTONIX  Take 1 tablet (40 mg total) by mouth once daily.     potassium chloride 10 MEQ Cpsr  Commonly known as: MICRO-K  Take 1 capsule (10 mEq  total) by mouth every other day. (TAKE WITH LASIX/FUROSEMIDE)     PRESERVISION AREDS-2 ORAL  Take 1 tablet by mouth once daily.     ranolazine 500 MG Tb12  Commonly known as: RANEXA  Take 1 tablet (500 mg total) by mouth 2 (two) times daily.     triamcinolone acetonide 0.1% 0.1 % cream  Commonly known as: KENALOG  Apply 1 g topically 2 (two) times daily.     VITAMIN C 500 MG tablet  Generic drug: ascorbic acid (vitamin C)  Take 500 mg by mouth 2 (two) times daily.         * This list has 2 medication(s) that are the same as other medications prescribed for you. Read the directions carefully, and ask your doctor or other care provider to review them with you.                Indwelling Lines/Drains at time of discharge:   Lines/Drains/Airways     Drain  Duration           Female External Urinary Catheter 08/26/23 0701 2 days              General: Patient resting comfortably in no acute distress. Appears as stated age. Calm  Eyes: EOM intact. No conjunctivae injection. No scleral icterus.  ENT: Hearing grossly intact. No discharge from ears. No nasal discharge.   CVS: RRR. No LE edema BL.  Lungs: CTA BL, no wheezing or crackles. Good breath sounds. No accessory muscle use. No acute respiratory distress  Neuro: non focal , Follows commands. Responds appropriately  Time spent on the discharge of patient: 33 minutes    Critical care time spent on the evaluation and treatment of severe organ dysfunction, review of pertinent labs and imaging studies, discussions with consulting providers and discussions with patient/family: 33 minutes.     Elio Grant MD  Department of Hospital Medicine  [facility]

## 2023-08-28 NOTE — PROGRESS NOTES
[facility]  Department of Cardiology  Progress Note    PATIENT NAME: Marisol Kerr  MRN: [id number]  TODAY'S DATE: 08/28/2023  ADMIT DATE: 8/25/2023    SUBJECTIVE     PRINCIPLE PROBLEM: <principal problem not specified>    INTERVAL HISTORY:    8/28/2023      Ms. Kerr denies CP currently. BP is elevated. Hemoglobin 8.0. Creatinine 1.6.      Review of patient's allergies indicates:   Allergen Reactions    Iodinated contrast media     Strawberries [strawberry] Hives and Itching       REVIEW OF SYSTEMS    +sob with exertion    OBJECTIVE     VITAL SIGNS (Most Recent)  Temp: 97.5 °F (36.4 °C) (08/28/23 0715)  Pulse: 61 (08/28/23 0745)  Resp: (!) 29 (08/28/23 0745)  BP: 108/72 (08/28/23 0715)  SpO2: (!) 87 % (08/28/23 0745)    VENTILATION STATUS  Resp: (!) 29 (08/28/23 0745)  SpO2: (!) 87 % (08/28/23 0745)           I & O (Last 24H):  Intake/Output Summary (Last 24 hours) at 8/28/2023 0902  Last data filed at 8/28/2023 0744  Gross per 24 hour   Intake 820 ml   Output 1150 ml   Net -330 ml       WEIGHTS  Wt Readings from Last 3 Encounters:   08/28/23 0400 77.2 kg (170 lb 3.1 oz)   08/27/23 0714 75 kg (165 lb 5.5 oz)   08/27/23 0400 75 kg (165 lb 5.5 oz)   08/26/23 0230 73.4 kg (161 lb 13.1 oz)   08/25/23 2211 68 kg (150 lb)   07/27/23 1742 66.5 kg (146 lb 11.2 oz)   07/27/23 0315 68 kg (150 lb)   07/27/23 1110 68 kg (150 lb)       PHYSICAL EXAM  CONSTITUTIONAL: Well built, well nourished in no apparent distress  NECK: no carotid bruit, no JVD  LUNGS: Diminished with mild expiratory wheezing  CHEST WALL: no tenderness  HEART: regular rate and rhythm, S1, S2 normal, no murmur,   ABDOMEN: soft, non-tender; bowel sounds normal; no masses,  no organomegaly  EXTREMITIES: Extremities normal, no edema  NEURO: AAO X 3    SCHEDULED MEDS:   albuterol-ipratropium  3 mL Nebulization Q6H WAKE    ALPRAZolam  0.25 mg Oral QHS    aspirin  81 mg Oral Daily    atorvastatin  40 mg Oral QHS    cholestyramine-aspartame  4 g Oral  "Daily    cloNIDine  0.2 mg Oral Once    clopidogreL  75 mg Oral Daily    diltiaZEM  120 mg Oral Daily    enoxparin  1 mg/kg Subcutaneous Daily    furosemide  40 mg Oral Daily    isosorbide mononitrate  60 mg Oral Daily    metoprolol succinate  75 mg Oral BID    mupirocin   Nasal BID    pantoprazole  40 mg Oral Before breakfast    polycarbophil  625 mg Oral Daily    predniSONE  40 mg Oral Daily    ranolazine  500 mg Oral BID       CONTINUOUS INFUSIONS:   nitroGLYCERIN Stopped (08/27/23 0840)       PRN MEDS:acetaminophen, dextrose 50%, dextrose 50%, glucagon (human recombinant), glucose, glucose, magnesium oxide, magnesium oxide, melatonin, morphine, naloxone, nitroGLYCERIN, ondansetron, polyethylene glycol, potassium bicarbonate, potassium bicarbonate, potassium bicarbonate, potassium, sodium phosphates, potassium, sodium phosphates, potassium, sodium phosphates, sodium chloride 0.9%    LABS AND DIAGNOSTICS     CBC LAST 3 DAYS  Recent Labs   Lab 08/26/23  0432 08/27/23  0454 08/28/23  0408   WBC 8.62 8.31 9.30   RBC 3.22* 2.89* 2.79*   HGB 9.3* 8.4* 8.0*   HCT 30.5* 27.3* 26.7*   MCV 95 95 96   MCH 28.9 29.1 28.7   MCHC 30.5* 30.8* 30.0*   RDW 14.9* 14.9* 14.7*    157 152   MPV 12.0 12.0 11.9   GRAN 93.8*  8.1* 75.2*  6.3 77.7*  7.2   LYMPH 4.5*  0.4* 16.5*  1.4 14.9*  1.4   MONO 0.9*  0.1* 7.5  0.6 6.6  0.6   BASO 0.02 0.02 0.02   NRBC 0 0 0       COAGULATION LAST 3 DAYS  No results for input(s): "LABPT", "INR", "APTT" in the last 168 hours.    CHEMISTRY LAST 3 DAYS  Recent Labs   Lab 08/25/23 2227 08/25/23  2301 08/26/23  0123 08/26/23  0432 08/27/23  0454 08/28/23  0408   NA  --    < >  --  136 138 136   K  --    < >  --  4.7 4.4 4.9   CL  --    < >  --  100 104 102   CO2  --    < >  --  29 31* 29   ANIONGAP  --    < >  --  7* 3* 5*   BUN  --    < >  --  32* 33* 49*   CREATININE  --    < >  --  1.5* 1.2 1.6*   GLU  --    < >  --  165* 113* 98   CALCIUM  --    < >  --  8.4* 8.1* 8.1*   PH 7.275*  " "--  7.332*  --   --   --    MG  --    < >  --  1.7 2.3 2.1   ALBUMIN  --    < >  --  2.7* 2.7* 2.6*   PROT  --    < >  --  5.4* 5.2* 5.1*   ALKPHOS  --    < >  --  72 59 63   ALT  --    < >  --  9* 11 11   AST  --    < >  --  16 17 14   BILITOT  --    < >  --  0.5 0.5 0.7    < > = values in this interval not displayed.       CARDIAC PROFILE LAST 3 DAYS  Recent Labs   Lab 08/25/23  2301 08/26/23  0120 08/26/23  2220 08/27/23  0454   *  --   --   --    TROPONINIHS 10.1 18.4* 27.4* 35.0*       ENDOCRINE LAST 3 DAYS  No results for input(s): "TSH", "PROCAL" in the last 168 hours.    LAST ARTERIAL BLOOD GAS  ABG  Recent Labs   Lab 08/26/23  0123   PH 7.332*   PO2 30*   PCO2 56.7*   HCO3 30.0*   BE 4       LAST 7 DAYS MICROBIOLOGY   Microbiology Results (last 7 days)       ** No results found for the last 168 hours. **            MOST RECENT IMAGING  Echo Saline Bubble? No    Left Ventricle: The left ventricle is normal in size. Moderately   increased ventricular mass. Moderately increased wall thickness. There is   moderate concentrict hypertrophy. Normal wall motion. There is normal   systolic function.  EF 61% There is normal diastolic function.    Left Atrium: Left atrium is moderately dilated.    Right Ventricle: Normal right ventricular cavity size. Wall thickness   is normal. Right ventricle wall motion  is normal. Systolic function is   normal.    Mitral Valve: Mildly thickened anterior leaflet. Mild mitral annular   calcification.    IVC/SVC: Normal venous pressure at 3 mmHg.    Pericardium: There is an effusion.    Limited echo; no gross pulmonary hypertension but poor visualization of   tricuspid signal    No tissue Doppler performed to assess mean left atrial pressure  X-Ray Chest AP Portable  Narrative: EXAMINATION:  XR CHEST AP PORTABLE    CLINICAL HISTORY:  Chest Pain;    FINDINGS:  Portable chest at 2254 compared with 07/27/2023 shows normal cardiomediastinal silhouette. Right subclavian central " venous catheter tip in region of right brachial cephalic vein unchanged.    Bibasilar reticular opacities show no significant change.  Minimal hazy opacities at lung bases suggest trace bilateral pleural effusions.  Pulmonary vasculature is normal. No pneumothorax.    No acute osseous abnormality.  Impression: 1. Unchanged bibasilar reticular opacities.  2. Hazy opacities at lung bases suggesting trace bilateral pleural effusions, new.    Electronically signed by: Flavio Lopez MD  Date:    08/26/2023  Time:    08:36  X-Ray Chest AP Portable  Narrative: EXAMINATION:  XR CHEST AP PORTABLE    CLINICAL HISTORY:  shortness of breath;    FINDINGS:  Portable chest at 516 compared with 08/25/2023 shows unchanged cardiomediastinal silhouette.  Significant degree of patient rotation is evident.  Right subclavian central venous catheter tip in region of right brachiocephalic vein unchanged.    Very mild bibasilar reticular opacities show no significant change without new confluent alveolar consolidation, pleural effusion, or pneumothorax.  Pulmonary vasculature is normal. No acute osseous abnormality.  Impression: No significant change.    Electronically signed by: Flavio Lopez MD  Date:    08/26/2023  Time:    08:24      [facility]  Results for orders placed during the hospital encounter of 08/25/23    Echo Saline Bubble? No    Interpretation Summary    Left Ventricle: The left ventricle is normal in size. Moderately increased ventricular mass. Moderately increased wall thickness. There is moderate concentrict hypertrophy. Normal wall motion. There is normal systolic function.  EF 61% There is normal diastolic function.    Left Atrium: Left atrium is moderately dilated.    Right Ventricle: Normal right ventricular cavity size. Wall thickness is normal. Right ventricle wall motion  is normal. Systolic function is normal.    Mitral Valve: Mildly thickened anterior leaflet. Mild mitral annular calcification.    IVC/SVC: Normal  venous pressure at 3 mmHg.    Pericardium: There is an effusion.    Limited echo; no gross pulmonary hypertension but poor visualization of tricuspid signal    No tissue Doppler performed to assess mean left atrial pressure      CURRENT/PREVIOUS VISIT EKG  Results for orders placed or performed during the hospital encounter of 08/25/23   EKG 12-lead    Collection Time: 08/27/23  7:12 AM    Narrative    Test Reason : R07.9,    Vent. Rate : 054 BPM     Atrial Rate : 054 BPM     P-R Int : 146 ms          QRS Dur : 098 ms      QT Int : 432 ms       P-R-T Axes : 066 065 040 degrees     QTc Int : 409 ms    Sinus bradycardia  Septal infarct ,age undetermined  Abnormal ECG  When compared with ECG of 26-AUG-2023 21:47,  Vent. rate has decreased BY  46 BPM  Septal infarct is now Present  ST no longer depressed in Inferior leads  ST no longer depressed in Anterior-lateral leads  T wave inversion no longer evident in Inferior leads  T wave inversion no longer evident in Anterior-lateral leads    Referred By: [email or web address]   SELF           Confirmed By:        ASSESSMENT/PLAN:     Active Hospital Problems    Diagnosis    Hypertensive emergency    Acute on chronic respiratory failure with hypoxia and hypercapnia    COPD exacerbation    Acute on chronic heart failure with preserved ejection fraction (HFpEF)    Chronic diastolic congestive heart failure    Chest pain    COPD/emphysema    Chronic kidney disease, stage 4 (severe)    CAD (coronary artery disease)    DM type 2, controlled, with complication    Essential hypertension, benign       ASSESSMENT & PLAN:       HTN Emergency  Acute on Chronic Respiratory Failure  Acute on Chronic HFpEF  COPD  CKD 4  CAD with severe disease to the LCX and LAD  DM      RECOMMENDATIONS:    Change Cardizem to Amlodipine 10   Increase Imdur to 120 mg daily  Continue ASA and Plavix  She is allergic to contrast media  She will need to be premedicated and would wait til her kidneys are optimal prior to  revascularization.  Appreciate Dr Chappell.   Dr. Chappell has agreed to take patient to [facility] to fix LCX artery        Ronda Johnson NP  Department of Cardiology  Date of Service: 08/28/2023      I have personally interviewed and examined the patient.  I have reviewed all the Nurse Practitioner's documentation, and agree with the plan.     REVIEWED.  CORONARY  She has diffuse disease and a dominant circumflex and ostial LAD 60-70% lesion and in the right coronary subtotal lesions ANGIOGRAMS    Patient is currently pain-free but blood pressure is poorly controlled.  Plan is control blood pressure   Dr. Chappell is accepted the patient agree with the plan  LOLIS Rodriges M.D.  Department of Cardiology  Date of Service: 08/28/2023  9:02 AM

## 2023-08-28 NOTE — PLAN OF CARE
Problem: Adult Inpatient Plan of Care  Goal: Plan of Care Review  Outcome: Ongoing, Progressing     Problem: Impaired Wound Healing  Goal: Optimal Wound Healing  Outcome: Ongoing, Progressing     Problem: Functional Ability Impaired COPD (Chronic Obstructive Pulmonary Disease)  Goal: Optimal Level of Functional [place]  Outcome: Ongoing, Progressing     Problem: Functional Ability Impaired (Heart Failure)  Goal: Optimal Functional Ability  Outcome: Ongoing, Progressing   Pt able to gt supper before the ambulance came. VS remain stable. Pt wanting to get a notary to get a living will done before she has her heart cath at [facility]. Daughter to go to [place] tomorrow and obtain some papers for a will.

## 2023-08-28 NOTE — ASSESSMENT & PLAN NOTE
Recent Labs     08/26/23  0432 08/27/23  0454 08/28/23  0408   BUN 32* 33* 49*   CREATININE 1.5* 1.2 1.6*     Plan:  - Avoid nephrotoxic agents such as NSAIDs, gadolinium and IV radiocontrast.  - Renally dose meds to current GFR.  - Maintain MAP > 65.

## 2023-08-29 ENCOUNTER — HOSPITAL ENCOUNTER (INPATIENT)
Facility: HOSPITAL | Age: 78
LOS: 7 days | Discharge: NURSING, REHAB OR LONG-TERM CARE | DRG: 246 | End: 2023-09-05
Attending: INTERNAL MEDICINE | Admitting: INTERNAL MEDICINE
Payer: MEDICARE

## 2023-08-29 DIAGNOSIS — R07.9 CHRONIC CHEST PAIN WITH HIGH RISK FOR CAD: ICD-10-CM

## 2023-08-29 DIAGNOSIS — K21.9 GASTROESOPHAGEAL REFLUX DISEASE WITHOUT ESOPHAGITIS: ICD-10-CM

## 2023-08-29 DIAGNOSIS — R79.89 ELEVATED TROPONIN: Primary | ICD-10-CM

## 2023-08-29 DIAGNOSIS — R06.02 SOB (SHORTNESS OF BREATH): ICD-10-CM

## 2023-08-29 DIAGNOSIS — Z91.89 CHRONIC CHEST PAIN WITH HIGH RISK FOR CAD: ICD-10-CM

## 2023-08-29 DIAGNOSIS — G89.29 CHRONIC CHEST PAIN WITH HIGH RISK FOR CAD: ICD-10-CM

## 2023-08-29 DIAGNOSIS — I21.4 NSTEMI (NON-ST ELEVATED MYOCARDIAL INFARCTION): ICD-10-CM

## 2023-08-29 LAB
ABO + RH BLD: NORMAL
ALBUMIN SERPL BCP-MCNC: 2.6 G/DL (ref 3.5–5.2)
ALP SERPL-CCNC: 70 U/L (ref 55–135)
ALT SERPL W/O P-5'-P-CCNC: 9 U/L (ref 10–44)
ANION GAP SERPL CALC-SCNC: 6 MMOL/L (ref 8–16)
AST SERPL-CCNC: 13 U/L (ref 10–40)
BASOPHILS # BLD AUTO: 0.02 K/UL (ref 0–0.2)
BASOPHILS NFR BLD: 0.2 % (ref 0–1.9)
BILIRUB SERPL-MCNC: 0.3 MG/DL (ref 0.1–1)
BLD GP AB SCN CELLS X3 SERPL QL: NORMAL
BNP SERPL-MCNC: 694 PG/ML (ref 0–99)
BUN SERPL-MCNC: 51 MG/DL (ref 8–23)
CALCIUM SERPL-MCNC: 8.7 MG/DL (ref 8.7–10.5)
CHLORIDE SERPL-SCNC: 101 MMOL/L (ref 95–110)
CO2 SERPL-SCNC: 32 MMOL/L (ref 23–29)
CREAT SERPL-MCNC: 1.7 MG/DL (ref 0.5–1.4)
DIFFERENTIAL METHOD: ABNORMAL
EOSINOPHIL # BLD AUTO: 0.1 K/UL (ref 0–0.5)
EOSINOPHIL NFR BLD: 0.7 % (ref 0–8)
ERYTHROCYTE [DISTWIDTH] IN BLOOD BY AUTOMATED COUNT: 14.6 % (ref 11.5–14.5)
EST. GFR  (NO RACE VARIABLE): 30.9 ML/MIN/1.73 M^2
GLUCOSE SERPL-MCNC: 83 MG/DL (ref 70–110)
HCT VFR BLD AUTO: 29.1 % (ref 37–48.5)
HGB BLD-MCNC: 8.5 G/DL (ref 12–16)
IMM GRANULOCYTES # BLD AUTO: 0.03 K/UL (ref 0–0.04)
IMM GRANULOCYTES NFR BLD AUTO: 0.3 % (ref 0–0.5)
LYMPHOCYTES # BLD AUTO: 1.7 K/UL (ref 1–4.8)
LYMPHOCYTES NFR BLD: 15.5 % (ref 18–48)
MAGNESIUM SERPL-MCNC: 2.3 MG/DL (ref 1.6–2.6)
MCH RBC QN AUTO: 27.7 PG (ref 27–31)
MCHC RBC AUTO-ENTMCNC: 29.2 G/DL (ref 32–36)
MCV RBC AUTO: 95 FL (ref 82–98)
MONOCYTES # BLD AUTO: 0.8 K/UL (ref 0.3–1)
MONOCYTES NFR BLD: 7.8 % (ref 4–15)
NEUTROPHILS # BLD AUTO: 8.1 K/UL (ref 1.8–7.7)
NEUTROPHILS NFR BLD: 75.5 % (ref 38–73)
NRBC BLD-RTO: 0 /100 WBC
PHOSPHATE SERPL-MCNC: 3.2 MG/DL (ref 2.7–4.5)
PLATELET # BLD AUTO: 163 K/UL (ref 150–450)
PMV BLD AUTO: 12.6 FL (ref 9.2–12.9)
POCT GLUCOSE: 148 MG/DL (ref 70–110)
POCT GLUCOSE: 81 MG/DL (ref 70–110)
POTASSIUM SERPL-SCNC: 5.2 MMOL/L (ref 3.5–5.1)
PROT SERPL-MCNC: 5.6 G/DL (ref 6–8.4)
RBC # BLD AUTO: 3.07 M/UL (ref 4–5.4)
SODIUM SERPL-SCNC: 139 MMOL/L (ref 136–145)
SPECIMEN OUTDATE: NORMAL
TROPONIN I SERPL DL<=0.01 NG/ML-MCNC: 0.01 NG/ML (ref 0–0.03)
TSH SERPL DL<=0.005 MIU/L-ACNC: 0.9 UIU/ML (ref 0.4–4)
WBC # BLD AUTO: 10.76 K/UL (ref 3.9–12.7)

## 2023-08-29 PROCEDURE — 25000242 PHARM REV CODE 250 ALT 637 W/ HCPCS

## 2023-08-29 PROCEDURE — 84443 ASSAY THYROID STIM HORMONE: CPT

## 2023-08-29 PROCEDURE — 99900035 HC TECH TIME PER 15 MIN (STAT)

## 2023-08-29 PROCEDURE — 99152 MOD SED SAME PHYS/QHP 5/>YRS: CPT | Mod: GC,,, | Performed by: INTERNAL MEDICINE

## 2023-08-29 PROCEDURE — C9600 PERC DRUG-EL COR STENT SING: HCPCS | Mod: LC | Performed by: INTERNAL MEDICINE

## 2023-08-29 PROCEDURE — 83880 ASSAY OF NATRIURETIC PEPTIDE: CPT

## 2023-08-29 PROCEDURE — 25500020 PHARM REV CODE 255: Performed by: INTERNAL MEDICINE

## 2023-08-29 PROCEDURE — 84484 ASSAY OF TROPONIN QUANT: CPT

## 2023-08-29 PROCEDURE — 99152 PR MOD CONSCIOUS SEDATION, SAME PHYS, 5+ YRS, FIRST 15 MIN: ICD-10-PCS | Mod: GC,,, | Performed by: INTERNAL MEDICINE

## 2023-08-29 PROCEDURE — 93571 PR HEART FLOW RESERV MEASURE,INIT VESSL: ICD-10-PCS | Mod: 26,52,GC, | Performed by: INTERNAL MEDICINE

## 2023-08-29 PROCEDURE — 84100 ASSAY OF PHOSPHORUS: CPT

## 2023-08-29 PROCEDURE — 94640 AIRWAY INHALATION TREATMENT: CPT

## 2023-08-29 PROCEDURE — 85025 COMPLETE CBC W/AUTO DIFF WBC: CPT

## 2023-08-29 PROCEDURE — 86900 BLOOD TYPING SEROLOGIC ABO: CPT | Performed by: STUDENT IN AN ORGANIZED HEALTH CARE EDUCATION/TRAINING PROGRAM

## 2023-08-29 PROCEDURE — 99152 MOD SED SAME PHYS/QHP 5/>YRS: CPT | Performed by: INTERNAL MEDICINE

## 2023-08-29 PROCEDURE — C1769 GUIDE WIRE: HCPCS | Performed by: INTERNAL MEDICINE

## 2023-08-29 PROCEDURE — 99233 SBSQ HOSP IP/OBS HIGH 50: CPT | Mod: 25,,, | Performed by: INTERNAL MEDICINE

## 2023-08-29 PROCEDURE — 80053 COMPREHEN METABOLIC PANEL: CPT

## 2023-08-29 PROCEDURE — C1894 INTRO/SHEATH, NON-LASER: HCPCS | Performed by: INTERNAL MEDICINE

## 2023-08-29 PROCEDURE — 92928 PR STENT: ICD-10-PCS | Mod: LC,GC,, | Performed by: INTERNAL MEDICINE

## 2023-08-29 PROCEDURE — 83735 ASSAY OF MAGNESIUM: CPT

## 2023-08-29 PROCEDURE — 92928 PRQ TCAT PLMT NTRAC ST 1 LES: CPT | Mod: LC,GC,, | Performed by: INTERNAL MEDICINE

## 2023-08-29 PROCEDURE — 93454 CORONARY ARTERY ANGIO S&I: CPT | Mod: 59 | Performed by: INTERNAL MEDICINE

## 2023-08-29 PROCEDURE — C1887 CATHETER, GUIDING: HCPCS | Performed by: INTERNAL MEDICINE

## 2023-08-29 PROCEDURE — 93454 PR CATH PLACE/CORONARY ANGIO, IMG SUPER/INTERP: ICD-10-PCS | Mod: 26,59,51,GC | Performed by: INTERNAL MEDICINE

## 2023-08-29 PROCEDURE — 63600175 PHARM REV CODE 636 W HCPCS: Performed by: STUDENT IN AN ORGANIZED HEALTH CARE EDUCATION/TRAINING PROGRAM

## 2023-08-29 PROCEDURE — 25000003 PHARM REV CODE 250: Performed by: INTERNAL MEDICINE

## 2023-08-29 PROCEDURE — 93571 IV DOP VEL&/PRESS C FLO 1ST: CPT | Mod: 26,52,GC, | Performed by: INTERNAL MEDICINE

## 2023-08-29 PROCEDURE — 63600175 PHARM REV CODE 636 W HCPCS: Performed by: INTERNAL MEDICINE

## 2023-08-29 PROCEDURE — 85347 COAGULATION TIME ACTIVATED: CPT | Performed by: INTERNAL MEDICINE

## 2023-08-29 PROCEDURE — 25000003 PHARM REV CODE 250

## 2023-08-29 PROCEDURE — 93799 UNLISTED CV SVC/PROCEDURE: CPT | Performed by: INTERNAL MEDICINE

## 2023-08-29 PROCEDURE — 99153 MOD SED SAME PHYS/QHP EA: CPT | Performed by: INTERNAL MEDICINE

## 2023-08-29 PROCEDURE — 25000003 PHARM REV CODE 250: Performed by: STUDENT IN AN ORGANIZED HEALTH CARE EDUCATION/TRAINING PROGRAM

## 2023-08-29 PROCEDURE — C1725 CATH, TRANSLUMIN NON-LASER: HCPCS | Performed by: INTERNAL MEDICINE

## 2023-08-29 PROCEDURE — 27000221 HC OXYGEN, UP TO 24 HOURS

## 2023-08-29 PROCEDURE — C1874 STENT, COATED/COV W/DEL SYS: HCPCS | Performed by: INTERNAL MEDICINE

## 2023-08-29 PROCEDURE — 27201423 OPTIME MED/SURG SUP & DEVICES STERILE SUPPLY: Performed by: INTERNAL MEDICINE

## 2023-08-29 PROCEDURE — 20600001 HC STEP DOWN PRIVATE ROOM

## 2023-08-29 PROCEDURE — 99233 PR SUBSEQUENT HOSPITAL CARE,LEVL III: ICD-10-PCS | Mod: 25,,, | Performed by: INTERNAL MEDICINE

## 2023-08-29 PROCEDURE — 94761 N-INVAS EAR/PLS OXIMETRY MLT: CPT

## 2023-08-29 PROCEDURE — 93454 CORONARY ARTERY ANGIO S&I: CPT | Mod: 26,59,51,GC | Performed by: INTERNAL MEDICINE

## 2023-08-29 DEVICE — EVEROLIMUS-ELUTING PLATINUM CHROMIUM CORONARY STENT SYSTEM
Type: IMPLANTABLE DEVICE | Site: CORONARY | Status: FUNCTIONAL
Brand: SYNERGY™ XD

## 2023-08-29 RX ORDER — ACETAMINOPHEN 325 MG/1
650 TABLET ORAL EVERY 4 HOURS PRN
Status: DISCONTINUED | OUTPATIENT
Start: 2023-08-29 | End: 2023-09-05 | Stop reason: HOSPADM

## 2023-08-29 RX ORDER — HEPARIN SODIUM 1000 [USP'U]/ML
INJECTION, SOLUTION INTRAVENOUS; SUBCUTANEOUS
Status: DISCONTINUED | OUTPATIENT
Start: 2023-08-29 | End: 2023-08-30

## 2023-08-29 RX ORDER — LIDOCAINE HYDROCHLORIDE 20 MG/ML
INJECTION, SOLUTION INFILTRATION; PERINEURAL
Status: DISCONTINUED | OUTPATIENT
Start: 2023-08-29 | End: 2023-08-30

## 2023-08-29 RX ORDER — MIDAZOLAM HYDROCHLORIDE 1 MG/ML
INJECTION INTRAMUSCULAR; INTRAVENOUS
Status: DISCONTINUED | OUTPATIENT
Start: 2023-08-29 | End: 2023-08-30

## 2023-08-29 RX ORDER — NITROGLYCERIN 0.4 MG/1
0.4 TABLET SUBLINGUAL EVERY 5 MIN PRN
Status: DISCONTINUED | OUTPATIENT
Start: 2023-08-29 | End: 2023-09-05 | Stop reason: HOSPADM

## 2023-08-29 RX ORDER — DIPHENHYDRAMINE HCL 50 MG
50 CAPSULE ORAL ONCE
Status: DISCONTINUED | OUTPATIENT
Start: 2023-08-29 | End: 2023-09-01

## 2023-08-29 RX ORDER — FAMOTIDINE 10 MG/ML
40 INJECTION INTRAVENOUS ONCE
Status: COMPLETED | OUTPATIENT
Start: 2023-08-29 | End: 2023-08-29

## 2023-08-29 RX ORDER — CEFAZOLIN SODIUM 1 G/3ML
INJECTION, POWDER, FOR SOLUTION INTRAMUSCULAR; INTRAVENOUS
Status: DISCONTINUED | OUTPATIENT
Start: 2023-08-29 | End: 2023-08-30

## 2023-08-29 RX ORDER — DIPHENHYDRAMINE HYDROCHLORIDE 50 MG/ML
50 INJECTION INTRAMUSCULAR; INTRAVENOUS ONCE
Status: COMPLETED | OUTPATIENT
Start: 2023-08-29 | End: 2023-08-29

## 2023-08-29 RX ORDER — SODIUM CHLORIDE 0.9 % (FLUSH) 0.9 %
10 SYRINGE (ML) INJECTION
Status: DISCONTINUED | OUTPATIENT
Start: 2023-08-29 | End: 2023-09-05 | Stop reason: HOSPADM

## 2023-08-29 RX ORDER — ISOSORBIDE MONONITRATE 30 MG/1
30 TABLET, EXTENDED RELEASE ORAL DAILY
Status: DISCONTINUED | OUTPATIENT
Start: 2023-08-29 | End: 2023-08-30

## 2023-08-29 RX ORDER — SODIUM CHLORIDE 9 MG/ML
INJECTION, SOLUTION INTRAVENOUS CONTINUOUS
Status: ACTIVE | OUTPATIENT
Start: 2023-08-29 | End: 2023-08-29

## 2023-08-29 RX ORDER — PROTAMINE SULFATE 10 MG/ML
INJECTION, SOLUTION INTRAVENOUS
Status: DISCONTINUED | OUTPATIENT
Start: 2023-08-29 | End: 2023-08-30

## 2023-08-29 RX ORDER — SODIUM CHLORIDE 9 MG/ML
INJECTION, SOLUTION INTRAVENOUS CONTINUOUS
Status: DISCONTINUED | OUTPATIENT
Start: 2023-08-29 | End: 2023-08-30

## 2023-08-29 RX ORDER — ONDANSETRON 4 MG/1
8 TABLET, ORALLY DISINTEGRATING ORAL EVERY 8 HOURS PRN
Status: DISCONTINUED | OUTPATIENT
Start: 2023-08-29 | End: 2023-09-05 | Stop reason: HOSPADM

## 2023-08-29 RX ORDER — IPRATROPIUM BROMIDE AND ALBUTEROL SULFATE 2.5; .5 MG/3ML; MG/3ML
3 SOLUTION RESPIRATORY (INHALATION) EVERY 4 HOURS PRN
Status: DISCONTINUED | OUTPATIENT
Start: 2023-08-29 | End: 2023-09-05 | Stop reason: HOSPADM

## 2023-08-29 RX ORDER — FENTANYL CITRATE 50 UG/ML
INJECTION, SOLUTION INTRAMUSCULAR; INTRAVENOUS
Status: DISCONTINUED | OUTPATIENT
Start: 2023-08-29 | End: 2023-08-30

## 2023-08-29 RX ORDER — FUROSEMIDE 20 MG/1
20 TABLET ORAL EVERY OTHER DAY
Status: DISCONTINUED | OUTPATIENT
Start: 2023-08-29 | End: 2023-09-01

## 2023-08-29 RX ORDER — NAPROXEN SODIUM 220 MG/1
81 TABLET, FILM COATED ORAL DAILY
Status: DISCONTINUED | OUTPATIENT
Start: 2023-08-29 | End: 2023-09-05 | Stop reason: HOSPADM

## 2023-08-29 RX ORDER — DIPHENHYDRAMINE HYDROCHLORIDE 50 MG/ML
50 INJECTION INTRAMUSCULAR; INTRAVENOUS ONCE
Status: CANCELLED | OUTPATIENT
Start: 2023-08-29 | End: 2023-08-29

## 2023-08-29 RX ORDER — FAMOTIDINE 10 MG/ML
40 INJECTION INTRAVENOUS ONCE
Status: CANCELLED | OUTPATIENT
Start: 2023-08-29 | End: 2023-08-29

## 2023-08-29 RX ORDER — CLOPIDOGREL BISULFATE 75 MG/1
75 TABLET ORAL DAILY
Status: DISCONTINUED | OUTPATIENT
Start: 2023-08-29 | End: 2023-09-05 | Stop reason: HOSPADM

## 2023-08-29 RX ADMIN — SODIUM ZIRCONIUM CYCLOSILICATE 5 G: 5 POWDER, FOR SUSPENSION ORAL at 11:08

## 2023-08-29 RX ADMIN — CLOPIDOGREL BISULFATE 75 MG: 75 TABLET ORAL at 08:08

## 2023-08-29 RX ADMIN — SODIUM CHLORIDE: 9 INJECTION, SOLUTION INTRAVENOUS at 10:08

## 2023-08-29 RX ADMIN — METHYLPREDNISOLONE SODIUM SUCCINATE 125 MG: 40 INJECTION, POWDER, FOR SOLUTION INTRAMUSCULAR; INTRAVENOUS at 08:08

## 2023-08-29 RX ADMIN — ASPIRIN 81 MG 81 MG: 81 TABLET ORAL at 08:08

## 2023-08-29 RX ADMIN — FAMOTIDINE 40 MG: 10 INJECTION, SOLUTION INTRAVENOUS at 08:08

## 2023-08-29 RX ADMIN — METOPROLOL TARTRATE 75 MG: 50 TABLET, FILM COATED ORAL at 09:08

## 2023-08-29 RX ADMIN — FAMOTIDINE 40 MG: 10 INJECTION INTRAVENOUS at 01:08

## 2023-08-29 RX ADMIN — SODIUM CHLORIDE: 0.9 INJECTION, SOLUTION INTRAVENOUS at 05:08

## 2023-08-29 RX ADMIN — DIPHENHYDRAMINE HYDROCHLORIDE 50 MG: 50 INJECTION, SOLUTION INTRAMUSCULAR; INTRAVENOUS at 08:08

## 2023-08-29 RX ADMIN — DIPHENHYDRAMINE HYDROCHLORIDE 50 MG: 50 INJECTION, SOLUTION INTRAMUSCULAR; INTRAVENOUS at 01:08

## 2023-08-29 RX ADMIN — IPRATROPIUM BROMIDE AND ALBUTEROL SULFATE 3 ML: .5; 3 SOLUTION RESPIRATORY (INHALATION) at 09:08

## 2023-08-29 RX ADMIN — ISOSORBIDE MONONITRATE 30 MG: 30 TABLET, EXTENDED RELEASE ORAL at 08:08

## 2023-08-29 RX ADMIN — FUROSEMIDE 20 MG: 20 TABLET ORAL at 08:08

## 2023-08-29 RX ADMIN — METHYLPREDNISOLONE SODIUM SUCCINATE 125 MG: 40 INJECTION, POWDER, FOR SOLUTION INTRAMUSCULAR; INTRAVENOUS at 01:08

## 2023-08-29 NOTE — NURSING
Nurses Note -- 4 Eyes      8/29/2023   12:23 PM      Skin assessed during: Transfer      [] No Altered Skin Integrity Present    []Prevention Measures Documented      [x] Yes- Altered Skin Integrity Present or Discovered   [x] LDA Added if Not in Epic (Describe Wound)   [] New Altered Skin Integrity was Present on Admit and Documented in LDA   [x] Wound Image Taken    Wound Care Consulted? Yes    Attending Nurse:  OFELIA Otero    Second RN/Staff Member:   OFELIA Chamorro

## 2023-08-29 NOTE — PLAN OF CARE
[facility] - Cardiology Stepdown  Initial Discharge Assessment       Primary Care Provider: Khadar Dwyer MD    Admission Diagnosis: Chronic chest pain with high risk for CAD [R07.9, G89.29, Z91.89]    Admission Date: 8/29/2023  Expected Discharge Date:     Transition of Care Barriers: None    Payor: MEDICARE / Plan: MEDICARE PART A & B / Product Type: Government /     Extended Emergency Contact Information  Primary Emergency Contact: Marisol Kerr  Address: [address] [place] [address]            [place] [address] [place]  Mobile Phone: [phone]  Relation: Daughter  Preferred language: English   needed? No    Discharge Plan A: Home Health  Discharge Plan B: Home with family      [facility] - [place], [place] - [address]  [address] [address]  Phone: [phone] Fax: [phone]    [facility] - [place], [place] - [address]  [address] [facility] [address]  Phone: [phone] Fax: [phone]      Initial Assessment (most recent)       Adult Discharge Assessment - 08/29/23 1325          Discharge Assessment    Assessment Type Discharge Planning Assessment     Confirmed/corrected address, phone number and insurance Yes     Confirmed Demographics Correct on Facesheet     Source of Information patient;family     Communicated LUZ with patient/caregiver Date not available/Unable to determine     Reason For Admission Acute chronic respiratory failure     People in Home alone     Facility Arrived From: home     Do you expect to return to your current living situation? Yes     Do you have help at home or someone to help you manage your care at home? Yes     Who are your caregiver(s) and their phone number(s)? Marisol Kerr (daughter) [phone]     Prior to hospitilization cognitive status: Alert/Oriented     Current cognitive status: Alert/Oriented     Walking or Climbing Stairs --   rolling walker , cane and wheelchair and O2 at  4 l/ nc    Dressing/Bathing bathing difficulty, requires equipment     Dressing/Bathing Management shower bench     Equipment Currently Used at Home walker, rolling;wheelchair;bath bench;cane, straight;oxygen     Readmission within 30 days? Yes     Patient currently being followed by outpatient case management? No     Do you currently have service(s) that help you manage your care at home? Yes     Name and Contact number of agency [facility]     Is the pt/caregiver preference to resume services with current agency Yes     Do you take prescription medications? Yes     Do you have prescription coverage? Yes     Coverage Medicare A & B, and Medicaid of [place].     Do you have any problems affording any of your prescribed medications? No     Is the patient taking medications as prescribed? yes     Who is going to help you get home at discharge? Marisol Kerr (daughter) [phone]     How do you get to doctors appointments? family or friend will provide     Are you on dialysis? No     Do you take coumadin? No     DME Needed Upon Discharge  none     Discharge Plan discussed with: Adult children;Patient     Transition of Care Barriers None     Discharge Plan A Home Health     Discharge Plan B Home with family        OTHER    Name(s) of People in Home none- daughter visits weekly and house keeper twice a week                      CM met with patient at the bedside and daughter and son in law came in ( Marisol and Fabio. ) Discussed the discharge process with them and gave them the discharge booklet and placed contact number on the white board in the room. Patient able to answer most questions . She verified her name , , home phone as [phone], and PCP and insurance Daughter interceded when needed. Patient lives alone and daughter stated she goes by weekly and assist her mother and has a house keeper bi weekly go assist her as well. Patient has home health through [facility] Patient also stated she went to [facility] not to long ago and thought they were great . She has grab bars, rolling walker, cane (straight) and W/C .  Patient spoke about getting a travel chair . Made aware if within 5 years it would not be covered with insurance. Her daughter stated she will be able to get her one.Patient interested in bedside delivery. Will continue to monitor for discharge needs.     Yani Swenson RN    [phone]

## 2023-08-29 NOTE — NURSING TRANSFER
Nursing Transfer Note      8/28/2023   10:52 PM      Reason patient is being transferred: Cardiology consult      Transfer with 4L O2, cardiac monitoring    Transported by [facility] service    Additional Lines: Oxygen    Medicines sent: None    Patient belongings transferred with patient: Yes    Chart send with patient: Yes    Notified: daughter & Staff at [facility]    Pt transported with cardiac monitor, and oxygen.

## 2023-08-29 NOTE — HPI
Consult Reason: RCA and Lcx CAD    78 pmhx multivessel CAD with stent on Plavix, very severe COPD, chronic diastolic HF, chronic hypoxemic respiratory failure on oxygen, obesity, CKD 3, history of cholangitis status post ERCP with sphincterotomy and bile duct stone removal, type 2 diabetes admitted for PCI to Lcx.    Patient presented to [place] with chest pain and mild troponin elevations. Known to have significant CAD to RCA and Lcx in 2/2022 cath that was treated medically due to considerable tortuosity. Also with elevated Bps that are exacerbating chest discomfort. Transferred to [facility] for attempt at Lcx. Currently being treated with aspirin, plavix, Now feels ***.     Cardiac home meds: ***  Allergies: ***  Access: ***  hgb 8.0, plt 152, Cr 1.6, Trop I of 35, EF 61%        - *** (copy and paste intro from above). Will evaluate with PCI/LHC. Patient is a BANDAR candidate  - *** (c/p labs from above)  - Anti-platelet Therapy: ***  - Access: R Radial, R fem.   - Allergies: No shellfish / Iodine contrast allergy  - Pre-Hydration: NS  - Pre-Op Med: Bendaryl 50mg pO   - All patient's questions were answered.  -The risks, benefits and alternatives of the procedure were explained to the patient.   -The risks of coronary angiography include but are not limited to: bleeding, infection, heart rhythm abnormalities, allergic reactions, kidney injury and potential need for dialysis, stroke and death.   - Should stenting be indicated, the patient has agreed to dual anti-platelet therapy for 1-consecutive year with a drug-eluting stent and a minimum of 1-month with the use of a bare metal stent  - Additionally, pt is aware that non-compliance is likely to result in stent clotting with heart attack, heart failure, and/or death  -The risks of moderate sedation include hypotension, respiratory depression, arrhythmias, bronchospasm, and death.   - Informed consent was obtained and the  patient is agreeable to proceed with  the procedure.

## 2023-08-29 NOTE — SUBJECTIVE & OBJECTIVE
Past Medical History:   Diagnosis Date    CAD (coronary artery disease)     Cancer     colon    CHF (congestive heart failure)     Colitis     Colon cancer     COPD (chronic obstructive pulmonary disease)     Decreased hearing     Diabetes mellitus     Diabetes mellitus, type 2     Hypercholesterolemia     Hypertension     Hypoxemia     Insomnia     Obesity     Osteoarthritis        Past Surgical History:   Procedure Laterality Date    ANGIOGRAM, CORONARY, WITH LEFT HEART CATHETERIZATION N/A 2/10/2022    Procedure: Angiogram, Coronary, with Left Heart Cath;  Surgeon: Cristian Benjamin MD;  Location: [facility] CATH/EP LAB;  Service: Cardiology;  Laterality: N/A;    CARDIAC CATHETERIZATION      CHOLECYSTECTOMY      COLECTOMY      CORONARY ANGIOPLASTY      CORONARY STENT PLACEMENT      ERCP N/A 1/16/2023    Procedure: ERCP (ENDOSCOPIC RETROGRADE CHOLANGIOPANCREATOGRAPHY);  Surgeon: Biju Kramer III, MD;  Location: [facility] ENDO;  Service: Endoscopy;  Laterality: N/A;    ESOPHAGOGASTRODUODENOSCOPY N/A 1/29/2023    Procedure: EGD (ESOPHAGOGASTRODUODENOSCOPY);  Surgeon: Aarti Alvarez MD;  Location: [facility] ENDO;  Service: Endoscopy;  Laterality: N/A;    EXTERNAL EAR SURGERY      EYE SURGERY      FLEXIBLE SIGMOIDOSCOPY N/A 9/19/2019    Procedure: SIGMOIDOSCOPY, FLEXIBLE;  Surgeon: Biju Kramer III, MD;  Location: [facility] ENDO;  Service: Endoscopy;  Laterality: N/A;    HYSTERECTOMY      partial       Review of patient's allergies indicates:   Allergen Reactions    Iodinated contrast media     Strawberries [strawberry] Hives and Itching       Current Facility-Administered Medications on File Prior to Encounter   Medication    [COMPLETED] albuterol-ipratropium (DUO-NEB) 2.5 mg-0.5 mg/3 mL nebulizer solution    [DISCONTINUED] acetaminophen tablet 650 mg    [DISCONTINUED] albuterol-ipratropium 2.5 mg-0.5 mg/3 mL nebulizer solution 3 mL    [DISCONTINUED] ALPRAZolam tablet 0.25 mg    [DISCONTINUED] amLODIPine tablet 10 mg     [DISCONTINUED] aspirin EC tablet 81 mg    [DISCONTINUED] atorvastatin tablet 40 mg    [DISCONTINUED] cholestyramine-aspartame 4 gram packet 4 g    [DISCONTINUED] cloNIDine tablet 0.2 mg    [DISCONTINUED] clopidogreL tablet 75 mg    [DISCONTINUED] dextrose 50% injection 12.5 g    [DISCONTINUED] dextrose 50% injection 25 g    [DISCONTINUED] diltiaZEM 24 hr capsule 120 mg    [DISCONTINUED] enoxaparin injection 70 mg    [DISCONTINUED] furosemide tablet 40 mg    [DISCONTINUED] glucagon (human recombinant) injection 1 mg    [DISCONTINUED] glucose chewable tablet 16 g    [DISCONTINUED] glucose chewable tablet 24 g    [DISCONTINUED] isosorbide mononitrate 24 hr tablet 120 mg    [DISCONTINUED] isosorbide mononitrate 24 hr tablet 60 mg    [DISCONTINUED] magnesium oxide tablet 800 mg    [DISCONTINUED] magnesium oxide tablet 800 mg    [DISCONTINUED] melatonin tablet 6 mg    [DISCONTINUED] metoprolol succinate 24 hr tablet 75 mg    [DISCONTINUED] morphine injection 2 mg    [DISCONTINUED] mupirocin 2 % ointment    [DISCONTINUED] naloxone 0.4 mg/mL injection 0.02 mg    [DISCONTINUED] nitroGLYCERIN in 5 % dextrose 50 mg/250 mL (200 mcg/mL) infusion    [DISCONTINUED] nitroGLYCERIN SL tablet 0.4 mg    [DISCONTINUED] ondansetron injection 4 mg    [DISCONTINUED] pantoprazole EC tablet 40 mg    [DISCONTINUED] polycarbophil tablet 625 mg    [DISCONTINUED] polyethylene glycol packet 17 g    [DISCONTINUED] potassium bicarbonate disintegrating tablet 35 mEq    [DISCONTINUED] potassium bicarbonate disintegrating tablet 50 mEq    [DISCONTINUED] potassium bicarbonate disintegrating tablet 60 mEq    [DISCONTINUED] potassium, sodium phosphates 280-160-250 mg packet 2 packet    [DISCONTINUED] potassium, sodium phosphates 280-160-250 mg packet 2 packet    [DISCONTINUED] potassium, sodium phosphates 280-160-250 mg packet 2 packet    [DISCONTINUED] predniSONE tablet 40 mg    [DISCONTINUED] ranolazine 12 hr tablet 500 mg    [DISCONTINUED] sodium  chloride 0.9% flush 10 mL     Current Outpatient Medications on File Prior to Encounter   Medication Sig    albuterol (VENTOLIN HFA) 90 mcg/actuation inhaler Inhale 2 puffs into the lungs every 6 (six) hours as needed for Wheezing or Shortness of Breath. Rescue    albuterol-ipratropium (DUO-NEB) 2.5 mg-0.5 mg/3 mL nebulizer solution Take 3 mLs by nebulization every 4 (four) hours as needed for Wheezing or Shortness of Breath. Rescue    allopurinoL (ZYLOPRIM) 100 MG tablet Take 0.5 tablets (50 mg total) by mouth once daily.    ALPRAZolam (XANAX) 0.25 MG tablet Take 1 tablet (0.25 mg total) by mouth every evening.    atorvastatin (LIPITOR) 40 MG tablet Take 1 tablet (40 mg total) by mouth once daily.    cholestyramine-aspartame (CHOLESTYRAMINE LIGHT) 4 gram PwPk Take 4 g by mouth once daily.    clopidogreL (PLAVIX) 75 mg tablet Take 1 tablet (75 mg total) by mouth once daily.    coenzyme Q10 100 mg capsule Take 100 mg by mouth every morning.    colchicine (COLCRYS) 0.6 mg tablet Take 0.3 mg by mouth every other day.    ergocalciferol (VITAMIN D2) 50,000 unit Cap Take 1 capsule (50,000 Units total) by mouth every 7 days.    ferrous sulfate 325 (65 FE) MG EC tablet Take 325 mg by mouth once daily.    fish oil-omega-3 fatty acids 300-1,000 mg capsule Take 2 capsules by mouth every morning.    fluticasone-salmeterol diskus inhaler 250-50 mcg Inhale 1 puff into the lungs 2 (two) times a day.    furosemide (LASIX) 20 MG tablet Take 1 tablet (20 mg total) by mouth every other day. TAKE WITH POTASSIUM    ipratropium-albuteroL (COMBIVENT RESPIMAT)  mcg/actuation inhaler Inhale 2 puffs into the lungs every 4 (four) hours as needed for Shortness of Breath. Rescue    isosorbide mononitrate (IMDUR) 30 MG 24 hr tablet Take 1 tablet (30 mg total) by mouth once daily.    ketoconazole (NIZORAL) 2 % cream Apply 1 application  topically 2 (two) times daily.    magnesium oxide (MAG-OX) 400 mg (241.3 mg magnesium) tablet Take 1  tablet by mouth 2 (two) times daily.    metoprolol tartrate 75 mg Tab Take 1 tablet (75 mg total) by mouth 3 (three) times daily.    mupirocin (BACTROBAN) 2 % ointment Apply topically 2 (two) times daily.    nitroGLYCERIN 0.4 MG/DOSE TL SPRY (NITROLINGUAL) 400 mcg/spray spray Place 1 spray under the tongue every 5 (five) minutes as needed for Chest pain (max of 3 doses).    pantoprazole (PROTONIX) 40 MG tablet Take 1 tablet (40 mg total) by mouth once daily.    potassium chloride (MICRO-K) 10 MEQ CpSR Take 1 capsule (10 mEq total) by mouth every other day. (TAKE WITH LASIX/FUROSEMIDE)    ranolazine (RANEXA) 500 MG Tb12 Take 1 tablet (500 mg total) by mouth 2 (two) times daily.    triamcinolone acetonide 0.1% (KENALOG) 0.1 % cream Apply 1 g topically 2 (two) times daily.    umeclidinium (INCRUSE ELLIPTA) 62.5 mcg/actuation inhalation capsule Inhale 62.5 mcg into the lungs every morning. Controller    vit C/E/Zn/coppr/lutein/zeaxan (PRESERVISION AREDS-2 ORAL) Take 1 tablet by mouth once daily.    VITAMIN C 500 MG tablet Take 500 mg by mouth 2 (two) times daily.    [DISCONTINUED] benazepriL (LOTENSIN) 40 MG tablet Take 1 tablet (40 mg total) by mouth once daily.    [DISCONTINUED] cimetidine (TAGAMET) 300 MG tablet Take 1 tablet (300 mg total) by mouth every 6 (six) hours. Take 1 tablet (300 mg total) by mouth starting at 6 PM on Sunday April 17, 2022 (the evening before your angiogram procedure). Then take 1 tablet at 12 AM, then take 1 tablet at 6 AM on Monday April 18th.    [DISCONTINUED] lisinopriL 10 MG tablet Take 1 tablet (10 mg total) by mouth once daily. HOLD UNTIL OTHERWISE DIRECTED BY PRIMARY CARE PROVIDER    [DISCONTINUED] lovastatin (MEVACOR) 40 MG tablet Take 1 tablet (40 mg total) by mouth every other day.     Family History       Problem Relation (Age of Onset)    Febrile seizures Mother    Heart failure Father          Tobacco Use    Smoking status: Former     Current packs/day: 0.00     Average  packs/day: 3.0 packs/day for 50.0 years (150.1 ttl pk-yrs)     Types: Cigarettes     Start date: 3/30/1964     Quit date: 2006     Years since quittin.5    Smokeless tobacco: Never    Tobacco comments:     ex smoker 15 years   Substance and Sexual Activity    Alcohol use: Yes    Drug use: No    Sexual activity: Never     Review of Systems   Constitutional: Negative for chills, diaphoresis, fever, malaise/fatigue, weight gain and weight loss.   HENT:  Negative for ear pain, nosebleeds and odynophagia.    Eyes:  Negative for blurred vision, double vision, vision loss in left eye, vision loss in right eye and visual disturbance.   Cardiovascular:  Positive for chest pain and dyspnea on exertion. Negative for leg swelling, near-syncope, palpitations and syncope.   Respiratory:  Negative for shortness of breath and wheezing.    Hematologic/Lymphatic: Negative for bleeding problem. Does not bruise/bleed easily.   Skin:  Negative for poor wound healing and rash.   Musculoskeletal:  Negative for back pain, joint pain and neck pain.   Gastrointestinal:  Negative for abdominal pain, change in bowel habit, constipation, diarrhea, hematemesis, hematochezia, melena, nausea and vomiting.   Genitourinary:  Negative for dysuria, flank pain, hematuria and pelvic pain.   Neurological:  Negative for dizziness, focal weakness, headaches, light-headedness, seizures and weakness.   Psychiatric/Behavioral:  Negative for memory loss. The patient does not have insomnia.      Objective:     Vital Signs (Most Recent):  Temp: 97.9 °F (36.6 °C) (23 0748)  Pulse: 65 (23 0904)  Resp: (!) 24 (23 0904)  BP: (!) 126/59 (23 0748)  SpO2: 99 % (23 0748) Vital Signs (24h Range):  Temp:  [97.4 °F (36.3 °C)-98.9 °F (37.2 °C)] 97.9 °F (36.6 °C)  Pulse:  [48-72] 65  Resp:  [16-43] 24  SpO2:  [96 %-100 %] 99 %  BP: (107-172)/(51-71) 126/59        There is no height or weight on file to calculate BMI.    SpO2: 99 %        No intake or output data in the 24 hours ending 08/29/23 1003    Lines/Drains/Airways       Peripheral Intravenous Line  Duration                  Peripheral IV - Single Lumen 08/27/23 2145 20 G Anterior;Left Forearm 1 day         Peripheral IV - Single Lumen 08/28/23 1605 20 G Anterior;Left Wrist <1 day                     Physical Exam  Vitals and nursing note reviewed.   Constitutional:       General: She is not in acute distress.     Appearance: Normal appearance. She is obese. She is not ill-appearing or diaphoretic.   HENT:      Head: Normocephalic and atraumatic.      Nose: Nose normal.      Mouth/Throat:      Mouth: Mucous membranes are moist.      Pharynx: Oropharynx is clear.      Comments: Oozing blister on bottom lip   Eyes:      Extraocular Movements: Extraocular movements intact.   Cardiovascular:      Rate and Rhythm: Normal rate and regular rhythm.      Pulses: Normal pulses.      Heart sounds: Murmur heard.   Pulmonary:      Effort: Pulmonary effort is normal.      Breath sounds: Normal breath sounds. No wheezing or rales.   Abdominal:      General: Bowel sounds are normal.      Palpations: Abdomen is soft.      Tenderness: There is no abdominal tenderness.      Hernia: A hernia (R hernia abdominal hernia, reducable, non-painful) is present.   Musculoskeletal:         General: Normal range of motion.      Cervical back: Normal range of motion and neck supple.      Right lower leg: Edema present.      Left lower leg: Edema present.   Skin:     General: Skin is warm and dry.   Neurological:      General: No focal deficit present.      Mental Status: She is alert and oriented to person, place, and time.   Psychiatric:         Mood and Affect: Mood normal.         Behavior: Behavior normal.          Significant Labs: CMP   Recent Labs   Lab 08/28/23  0408 08/29/23  0855    139   K 4.9 5.2*    101   CO2 29 32*   GLU 98 83   BUN 49* 51*   CREATININE 1.6* 1.7*   CALCIUM 8.1* 8.7   PROT 5.1*  5.6*   ALBUMIN 2.6* 2.6*   BILITOT 0.7 0.3   ALKPHOS 63 70   AST 14 13   ALT 11 9*   ANIONGAP 5* 6*   , CBC   Recent Labs   Lab 08/28/23  0408 08/29/23  0855   WBC 9.30 10.76   HGB 8.0* 8.5*   HCT 26.7* 29.1*    163   , Troponin   Recent Labs   Lab 08/29/23  0855   TROPONINI 0.013   , and All pertinent lab results from the last 24 hours have been reviewed.    Significant Imaging:  all imaging reviewed

## 2023-08-29 NOTE — Clinical Note
The sheath was exchanged in the right femoral artery. [FreeTextEntry1] : Fixed flexion right thigh and restricted mobility suggests right hip disease. Urinary incontinence could be age related, central or due to lumbar canal stenosis. MRI lumbar spine and XRay right hip are the next steps before Physical Therapy can be prescribed correctly. She also has signs of dementia. I have reviewed the MRI and carotid doppler. There are no immediate surgical treatments required. She does have mild cerebrovascular microangiopathy but not enough to cause vascular dementia. If her low score on the MMSE is not due to educational status she could be diagnosed with AD.. Also recommend NCV/ EMG to investigate and differentiate neuropathy and radiculopathy as causes of lower extremity weakness

## 2023-08-29 NOTE — ASSESSMENT & PLAN NOTE
78 yoF admitted for ongoing chest pain. Noted to have elevated troponins at OSH but here had troponin of 0.013. patient has some chest pain but only when anxious. Patient has severe multivessel disease. Patient transferred for C.     - plan for C today  - NPO  - continue DAPT  - contrast allergy, will premedicate

## 2023-08-29 NOTE — PLAN OF CARE
08/28/23 1923   Patient Assessment/Suction   Level of Consciousness (AVPU) alert   Respiratory Effort Unlabored   Expansion/Accessory Muscles/Retractions expansion symmetric   All Lung Fields Breath Sounds coarse   Rhythm/Pattern, Respiratory depth regular;pattern regular   Cough Frequency infrequent   Cough Type good;nonproductive   PRE-TX-O2   Device (Oxygen Therapy) nasal cannula   $ Is the patient on Low Flow Oxygen? Yes   Flow (L/min) 4   SpO2 99 %   Pulse Oximetry Type Continuous   $ Pulse Oximetry - Multiple Charge Pulse Oximetry - Multiple   Pulse (!) 59   Resp 18   Aerosol Therapy   $ Aerosol Therapy Charges Aerosol Treatment   Daily Review of Necessity (SVN) completed   Respiratory Treatment Status (SVN) given   Treatment Route (SVN) mask   Patient Position (SVN) semi-Solorzano's   Post Treatment Assessment (SVN) breath sounds unchanged   Signs of Intolerance (SVN) none   Breath Sounds Post-Respiratory Treatment   Throughout All Fields Post-Treatment All Fields   Throughout All Fields Post-Treatment no change   Post-treatment Heart Rate (beats/min) 60   Post-treatment Resp Rate (breaths/min) 20   Education   $ Education DME Oxygen;15 min

## 2023-08-29 NOTE — Clinical Note
140 ml of contrast were injected throughout the case. 60 mL of contrast was the total wasted during the case. 200 mL was the total amount used during the case.

## 2023-08-29 NOTE — Clinical Note
An angiography was performed of the left coronary arteries. The angiography was performed via hand injection with .

## 2023-08-29 NOTE — H&P
[facility] - Cardiology Stepdown  Cardiology  History and Physical     Patient Name: Marisol Kerr  MRN: [id number]  Admission Date: 8/29/2023  Code Status: Full Code   Attending Provider: Nba Riggs MD   Primary Care Physician: Khadar Dwyer MD  Principal Problem:Acute on chronic respiratory failure    Patient information was obtained from patient, past medical records and ER records.     Subjective:     Chief Complaint:  Chest pain     HPI:  This is a 77 year old lady with severe multivessel CAD not CABG candidate, COPD on home 4L NC, HFpEF (Ef61%), CKD3, and T2DM who presented at [facility] with chest pain and respiratory distress. Patient had multiple prior admission to [facility] for angina and CHF exacerbations. Patient had taken multiple NTG for chest pain and was noted to have been worried about overdosing on the NTG for which she was anxious and BP was spiking during the anxious episodes to the 216s systolic which came down with Tridil gtt but patient was still complaining of intermittent chest pain. Cardiology evaluated patient at [place] and was thinking that patient's known Lcx stenosis was the culprit of the chest pain and patient was transferred to [facility] [place] [place] for further interventional evaluation.     Upon chart review, patient was noted to be on BiPAP for worsening shortness of breath. Prior cath on 2/10/22 showed:    RPAV lesion was 100% stenosed.  RPDA lesion was 100% stenosed.  Prox Cx to Mid Cx lesion was 80% stenosed.  Dist Cx lesion was 70% stenosed.  1st LPL lesion was 90% stenosed.  Prox RCA-2 lesion was 70% stenosed.  Prox RCA-1 lesion was 90% stenosed.  Mid LAD-2 lesion was 60% stenosed.    Echo from OSH on 8/26/23 showed EF of 61% with CVP of 3 and moderately dilated LA.       Past Medical History:   Diagnosis Date    CAD (coronary artery disease)     Cancer     colon    CHF (congestive heart failure)     Colitis     Colon cancer     COPD (chronic obstructive pulmonary disease)     Decreased  hearing     Diabetes mellitus     Diabetes mellitus, type 2     Hypercholesterolemia     Hypertension     Hypoxemia     Insomnia     Obesity     Osteoarthritis        Past Surgical History:   Procedure Laterality Date    ANGIOGRAM, CORONARY, WITH LEFT HEART CATHETERIZATION N/A 2/10/2022    Procedure: Angiogram, Coronary, with Left Heart Cath;  Surgeon: Cristian Benjamin MD;  Location: [facility] CATH/EP LAB;  Service: Cardiology;  Laterality: N/A;    CARDIAC CATHETERIZATION      CHOLECYSTECTOMY      COLECTOMY      CORONARY ANGIOPLASTY      CORONARY STENT PLACEMENT      ERCP N/A 1/16/2023    Procedure: ERCP (ENDOSCOPIC RETROGRADE CHOLANGIOPANCREATOGRAPHY);  Surgeon: Biju Kramer III, MD;  Location: [facility] ENDO;  Service: Endoscopy;  Laterality: N/A;    ESOPHAGOGASTRODUODENOSCOPY N/A 1/29/2023    Procedure: EGD (ESOPHAGOGASTRODUODENOSCOPY);  Surgeon: Aarti Alvarez MD;  Location: [facility] ENDO;  Service: Endoscopy;  Laterality: N/A;    EXTERNAL EAR SURGERY      EYE SURGERY      FLEXIBLE SIGMOIDOSCOPY N/A 9/19/2019    Procedure: SIGMOIDOSCOPY, FLEXIBLE;  Surgeon: Biju Kramer III, MD;  Location: [facility];  Service: Endoscopy;  Laterality: N/A;    HYSTERECTOMY      partial       Review of patient's allergies indicates:   Allergen Reactions    Iodinated contrast media     Strawberries [strawberry] Hives and Itching       Current Facility-Administered Medications on File Prior to Encounter   Medication    [COMPLETED] albuterol-ipratropium (DUO-NEB) 2.5 mg-0.5 mg/3 mL nebulizer solution    [DISCONTINUED] acetaminophen tablet 650 mg    [DISCONTINUED] albuterol-ipratropium 2.5 mg-0.5 mg/3 mL nebulizer solution 3 mL    [DISCONTINUED] ALPRAZolam tablet 0.25 mg    [DISCONTINUED] amLODIPine tablet 10 mg    [DISCONTINUED] aspirin EC tablet 81 mg    [DISCONTINUED] atorvastatin tablet 40 mg    [DISCONTINUED] cholestyramine-aspartame 4 gram packet 4 g    [DISCONTINUED] cloNIDine tablet 0.2 mg    [DISCONTINUED] clopidogreL  tablet 75 mg    [DISCONTINUED] dextrose 50% injection 12.5 g    [DISCONTINUED] dextrose 50% injection 25 g    [DISCONTINUED] diltiaZEM 24 hr capsule 120 mg    [DISCONTINUED] enoxaparin injection 70 mg    [DISCONTINUED] furosemide tablet 40 mg    [DISCONTINUED] glucagon (human recombinant) injection 1 mg    [DISCONTINUED] glucose chewable tablet 16 g    [DISCONTINUED] glucose chewable tablet 24 g    [DISCONTINUED] isosorbide mononitrate 24 hr tablet 120 mg    [DISCONTINUED] isosorbide mononitrate 24 hr tablet 60 mg    [DISCONTINUED] magnesium oxide tablet 800 mg    [DISCONTINUED] magnesium oxide tablet 800 mg    [DISCONTINUED] melatonin tablet 6 mg    [DISCONTINUED] metoprolol succinate 24 hr tablet 75 mg    [DISCONTINUED] morphine injection 2 mg    [DISCONTINUED] mupirocin 2 % ointment    [DISCONTINUED] naloxone 0.4 mg/mL injection 0.02 mg    [DISCONTINUED] nitroGLYCERIN in 5 % dextrose 50 mg/250 mL (200 mcg/mL) infusion    [DISCONTINUED] nitroGLYCERIN SL tablet 0.4 mg    [DISCONTINUED] ondansetron injection 4 mg    [DISCONTINUED] pantoprazole EC tablet 40 mg    [DISCONTINUED] polycarbophil tablet 625 mg    [DISCONTINUED] polyethylene glycol packet 17 g    [DISCONTINUED] potassium bicarbonate disintegrating tablet 35 mEq    [DISCONTINUED] potassium bicarbonate disintegrating tablet 50 mEq    [DISCONTINUED] potassium bicarbonate disintegrating tablet 60 mEq    [DISCONTINUED] potassium, sodium phosphates 280-160-250 mg packet 2 packet    [DISCONTINUED] potassium, sodium phosphates 280-160-250 mg packet 2 packet    [DISCONTINUED] potassium, sodium phosphates 280-160-250 mg packet 2 packet    [DISCONTINUED] predniSONE tablet 40 mg    [DISCONTINUED] ranolazine 12 hr tablet 500 mg    [DISCONTINUED] sodium chloride 0.9% flush 10 mL     Current Outpatient Medications on File Prior to Encounter   Medication Sig    albuterol (VENTOLIN HFA) 90 mcg/actuation inhaler Inhale 2 puffs into the lungs every 6 (six) hours as needed  for Wheezing or Shortness of Breath. Rescue    albuterol-ipratropium (DUO-NEB) 2.5 mg-0.5 mg/3 mL nebulizer solution Take 3 mLs by nebulization every 4 (four) hours as needed for Wheezing or Shortness of Breath. Rescue    allopurinoL (ZYLOPRIM) 100 MG tablet Take 0.5 tablets (50 mg total) by mouth once daily.    ALPRAZolam (XANAX) 0.25 MG tablet Take 1 tablet (0.25 mg total) by mouth every evening.    atorvastatin (LIPITOR) 40 MG tablet Take 1 tablet (40 mg total) by mouth once daily.    cholestyramine-aspartame (CHOLESTYRAMINE LIGHT) 4 gram PwPk Take 4 g by mouth once daily.    clopidogreL (PLAVIX) 75 mg tablet Take 1 tablet (75 mg total) by mouth once daily.    coenzyme Q10 100 mg capsule Take 100 mg by mouth every morning.    colchicine (COLCRYS) 0.6 mg tablet Take 0.3 mg by mouth every other day.    ergocalciferol (VITAMIN D2) 50,000 unit Cap Take 1 capsule (50,000 Units total) by mouth every 7 days.    ferrous sulfate 325 (65 FE) MG EC tablet Take 325 mg by mouth once daily.    fish oil-omega-3 fatty acids 300-1,000 mg capsule Take 2 capsules by mouth every morning.    fluticasone-salmeterol diskus inhaler 250-50 mcg Inhale 1 puff into the lungs 2 (two) times a day.    furosemide (LASIX) 20 MG tablet Take 1 tablet (20 mg total) by mouth every other day. TAKE WITH POTASSIUM    ipratropium-albuteroL (COMBIVENT RESPIMAT)  mcg/actuation inhaler Inhale 2 puffs into the lungs every 4 (four) hours as needed for Shortness of Breath. Rescue    isosorbide mononitrate (IMDUR) 30 MG 24 hr tablet Take 1 tablet (30 mg total) by mouth once daily.    ketoconazole (NIZORAL) 2 % cream Apply 1 application  topically 2 (two) times daily.    magnesium oxide (MAG-OX) 400 mg (241.3 mg magnesium) tablet Take 1 tablet by mouth 2 (two) times daily.    metoprolol tartrate 75 mg Tab Take 1 tablet (75 mg total) by mouth 3 (three) times daily.    mupirocin (BACTROBAN) 2 % ointment Apply topically 2 (two) times daily.    nitroGLYCERIN  0.4 MG/DOSE TL SPRY (NITROLINGUAL) 400 mcg/spray spray Place 1 spray under the tongue every 5 (five) minutes as needed for Chest pain (max of 3 doses).    pantoprazole (PROTONIX) 40 MG tablet Take 1 tablet (40 mg total) by mouth once daily.    potassium chloride (MICRO-K) 10 MEQ CpSR Take 1 capsule (10 mEq total) by mouth every other day. (TAKE WITH LASIX/FUROSEMIDE)    ranolazine (RANEXA) 500 MG Tb12 Take 1 tablet (500 mg total) by mouth 2 (two) times daily.    triamcinolone acetonide 0.1% (KENALOG) 0.1 % cream Apply 1 g topically 2 (two) times daily.    umeclidinium (INCRUSE ELLIPTA) 62.5 mcg/actuation inhalation capsule Inhale 62.5 mcg into the lungs every morning. Controller    vit C/E/Zn/coppr/lutein/zeaxan (PRESERVISION AREDS-2 ORAL) Take 1 tablet by mouth once daily.    VITAMIN C 500 MG tablet Take 500 mg by mouth 2 (two) times daily.    [DISCONTINUED] benazepriL (LOTENSIN) 40 MG tablet Take 1 tablet (40 mg total) by mouth once daily.    [DISCONTINUED] cimetidine (TAGAMET) 300 MG tablet Take 1 tablet (300 mg total) by mouth every 6 (six) hours. Take 1 tablet (300 mg total) by mouth starting at 6 PM on 2022 (the evening before your angiogram procedure). Then take 1 tablet at 12 AM, then take 1 tablet at 6 AM on .    [DISCONTINUED] lisinopriL 10 MG tablet Take 1 tablet (10 mg total) by mouth once daily. HOLD UNTIL OTHERWISE DIRECTED BY PRIMARY CARE PROVIDER    [DISCONTINUED] lovastatin (MEVACOR) 40 MG tablet Take 1 tablet (40 mg total) by mouth every other day.     Family History       Problem Relation (Age of Onset)    Febrile seizures Mother    Heart failure Father          Tobacco Use    Smoking status: Former     Current packs/day: 0.00     Average packs/day: 3.0 packs/day for 50.0 years (150.1 ttl pk-yrs)     Types: Cigarettes     Start date: 3/30/1964     Quit date: 2006     Years since quittin.5    Smokeless tobacco: Never    Tobacco comments:     ex smoker 15  years   Substance and Sexual Activity    Alcohol use: Yes    Drug use: No    Sexual activity: Never     Review of Systems   Constitutional: Negative for chills, diaphoresis, fever, malaise/fatigue, weight gain and weight loss.   HENT:  Negative for ear pain, nosebleeds and odynophagia.    Eyes:  Negative for blurred vision, double vision, vision loss in left eye, vision loss in right eye and visual disturbance.   Cardiovascular:  Positive for chest pain and dyspnea on exertion. Negative for leg swelling, near-syncope, palpitations and syncope.   Respiratory:  Negative for shortness of breath and wheezing.    Hematologic/Lymphatic: Negative for bleeding problem. Does not bruise/bleed easily.   Skin:  Negative for poor wound healing and rash.   Musculoskeletal:  Negative for back pain, joint pain and neck pain.   Gastrointestinal:  Negative for abdominal pain, change in bowel habit, constipation, diarrhea, hematemesis, hematochezia, melena, nausea and vomiting.   Genitourinary:  Negative for dysuria, flank pain, hematuria and pelvic pain.   Neurological:  Negative for dizziness, focal weakness, headaches, light-headedness, seizures and weakness.   Psychiatric/Behavioral:  Negative for memory loss. The patient does not have insomnia.      Objective:     Vital Signs (Most Recent):  Temp: 97.9 °F (36.6 °C) (08/29/23 0748)  Pulse: 65 (08/29/23 0904)  Resp: (!) 24 (08/29/23 0904)  BP: (!) 126/59 (08/29/23 0748)  SpO2: 99 % (08/29/23 0748) Vital Signs (24h Range):  Temp:  [97.4 °F (36.3 °C)-98.9 °F (37.2 °C)] 97.9 °F (36.6 °C)  Pulse:  [48-72] 65  Resp:  [16-43] 24  SpO2:  [96 %-100 %] 99 %  BP: (107-172)/(51-71) 126/59        There is no height or weight on file to calculate BMI.    SpO2: 99 %       No intake or output data in the 24 hours ending 08/29/23 1003    Lines/Drains/Airways       Peripheral Intravenous Line  Duration                  Peripheral IV - Single Lumen 08/27/23 2145 20 G Anterior;Left Forearm 1 day          Peripheral IV - Single Lumen 08/28/23 1605 20 G Anterior;Left Wrist <1 day                     Physical Exam  Vitals and nursing note reviewed.   Constitutional:       General: She is not in acute distress.     Appearance: Normal appearance. She is obese. She is not ill-appearing or diaphoretic.   HENT:      Head: Normocephalic and atraumatic.      Nose: Nose normal.      Mouth/Throat:      Mouth: Mucous membranes are moist.      Pharynx: Oropharynx is clear.      Comments: Oozing blister on bottom lip   Eyes:      Extraocular Movements: Extraocular movements intact.   Cardiovascular:      Rate and Rhythm: Normal rate and regular rhythm.      Pulses: Normal pulses.      Heart sounds: Murmur heard.   Pulmonary:      Effort: Pulmonary effort is normal.      Breath sounds: Normal breath sounds. No wheezing or rales.   Abdominal:      General: Bowel sounds are normal.      Palpations: Abdomen is soft.      Tenderness: There is no abdominal tenderness.      Hernia: A hernia (R hernia abdominal hernia, reducable, non-painful) is present.   Musculoskeletal:         General: Normal range of motion.      Cervical back: Normal range of motion and neck supple.      Right lower leg: Edema present.      Left lower leg: Edema present.   Skin:     General: Skin is warm and dry.   Neurological:      General: No focal deficit present.      Mental Status: She is alert and oriented to person, place, and time.   Psychiatric:         Mood and Affect: Mood normal.         Behavior: Behavior normal.          Significant Labs: CMP   Recent Labs   Lab 08/28/23  0408 08/29/23  0855    139   K 4.9 5.2*    101   CO2 29 32*   GLU 98 83   BUN 49* 51*   CREATININE 1.6* 1.7*   CALCIUM 8.1* 8.7   PROT 5.1* 5.6*   ALBUMIN 2.6* 2.6*   BILITOT 0.7 0.3   ALKPHOS 63 70   AST 14 13   ALT 11 9*   ANIONGAP 5* 6*   , CBC   Recent Labs   Lab 08/28/23  0408 08/29/23  0855   WBC 9.30 10.76   HGB 8.0* 8.5*   HCT 26.7* 29.1*    163   ,  Troponin   Recent Labs   Lab 08/29/23  0855   TROPONINI 0.013   , and All pertinent lab results from the last 24 hours have been reviewed.    Significant Imaging:  all imaging reviewed    Assessment and Plan:     Chronic diastolic congestive heart failure  Patient is identified as having Diastolic (HFpEF) heart failure that is Chronic. CHF is currently controlled. Latest ECHO performed and demonstrates- Results for orders placed during the hospital encounter of 08/25/23    Echo Saline Bubble? No    Interpretation Summary    Left Ventricle: The left ventricle is normal in size. Moderately increased ventricular mass. Moderately increased wall thickness. There is moderate concentrict hypertrophy. Normal wall motion. There is normal systolic function.  EF 61% There is normal diastolic function.    Left Atrium: Left atrium is moderately dilated.    Right Ventricle: Normal right ventricular cavity size. Wall thickness is normal. Right ventricle wall motion  is normal. Systolic function is normal.    Mitral Valve: Mildly thickened anterior leaflet. Mild mitral annular calcification.    IVC/SVC: Normal venous pressure at 3 mmHg.    Pericardium: There is an effusion.    Limited echo; no gross pulmonary hypertension but poor visualization of tricuspid signal    No tissue Doppler performed to assess mean left atrial pressure  . Continue Beta Blocker, Furosemide and Nitrate/Vasodilator and monitor clinical status closely. Monitor on telemetry. Patient is off CHF pathway.  Monitor strict Is&Os and daily weights.  Place on fluid restriction of 1.5 L. Continue to stress to patient importance of self efficacy and  on diet for CHF. Last BNP reviewed- and noted below   Recent Labs   Lab 08/29/23  0855   *   .    Chronic kidney disease, stage 4 (severe)  Patient with chronic CKD baseline Cr 1.5-1.7.     - avoid nephrotoxic agents or renally dose medications  - trend Cr    Gastroesophageal reflux disease without  esophagitis  Continue ppi    DM type 2, controlled, with complication  Last A1c on 8/25 showed 5.0. BG slightly elevated.     - LDSSI  - POCT glucose    Essential hypertension, benign  Hold home meds and resume when clinically indicated    Hypercholesterolemia  Statin held at OSH    - will resume when clinically indicated    Unstable angina  78 yoF admitted for ongoing chest pain. Noted to have elevated troponins at OSH but here had troponin of 0.013. patient has some chest pain but only when anxious. Patient has severe multivessel disease. Patient transferred for [facility].     - plan for [facility] today  - NPO  - continue DAPT  - contrast allergy, will premedicate        VTE Risk Mitigation (From admission, onward)           Ordered     IP VTE HIGH RISK PATIENT  Once         08/29/23 0730     Place sequential compression device  Until discontinued         08/29/23 0730                    Becca Jacobson MD  Cardiology   [facility] - Cardiology Stepdown        I have seen the patient, reviewed the Fellow's history and physical, assessment and plan. I have personally interviewed and examined the patient and agree with the findings.     AIMEE Riggs MD

## 2023-08-29 NOTE — ASSESSMENT & PLAN NOTE
Patient with chronic CKD baseline Cr 1.5-1.7.     - avoid nephrotoxic agents or renally dose medications  - trend Cr

## 2023-08-29 NOTE — ASSESSMENT & PLAN NOTE
Patient is identified as having Diastolic (HFpEF) heart failure that is Chronic. CHF is currently controlled. Latest ECHO performed and demonstrates- Results for orders placed during the hospital encounter of 08/25/23    Echo Saline Bubble? No    Interpretation Summary    Left Ventricle: The left ventricle is normal in size. Moderately increased ventricular mass. Moderately increased wall thickness. There is moderate concentrict hypertrophy. Normal wall motion. There is normal systolic function.  EF 61% There is normal diastolic function.    Left Atrium: Left atrium is moderately dilated.    Right Ventricle: Normal right ventricular cavity size. Wall thickness is normal. Right ventricle wall motion  is normal. Systolic function is normal.    Mitral Valve: Mildly thickened anterior leaflet. Mild mitral annular calcification.    IVC/SVC: Normal venous pressure at 3 mmHg.    Pericardium: There is an effusion.    Limited echo; no gross pulmonary hypertension but poor visualization of tricuspid signal    No tissue Doppler performed to assess mean left atrial pressure  . Continue Beta Blocker, Furosemide and Nitrate/Vasodilator and monitor clinical status closely. Monitor on telemetry. Patient is off CHF pathway.  Monitor strict Is&Os and daily weights.  Place on fluid restriction of 1.5 L. Continue to stress to patient importance of self efficacy and  on diet for CHF. Last BNP reviewed- and noted below   Recent Labs   Lab 08/29/23  0855   *   .

## 2023-08-29 NOTE — PLAN OF CARE
"Dx: <principal problem not specified>    Shift Events: Plan of care reviewed with patient, Marisol Kerr , verbalized understanding. Patient progressing toward goals of care. ANGELINA. Safety measures in place and maintained throughout shift.     Neuro: AAO x4    Vital Signs: BP (!) 107/52   Pulse (!) 51   Temp 98.6 °F (37 °C) (Oral)   Resp (!) 28   Ht 5' 3" (1.6 m)   Wt 77.2 kg (170 lb 3.1 oz)   SpO2 100%   Breastfeeding No   BMI 30.15 kg/m²     Respiratory: Nasal Cannula    Diet: Cardiac Diet     Labs/Accuchecks: Per orders.            Problem: Adult Inpatient Plan of Care  Goal: Plan of Care Review  Outcome: Ongoing, Progressing  Goal: Patient-Specific Goal (Individualized)  Outcome: Ongoing, Progressing  Goal: Absence of Hospital-Acquired Illness or Injury  Outcome: Ongoing, Progressing  Goal: Optimal Comfort and Wellbeing  Outcome: Ongoing, Progressing  Goal: Readiness for Transition of Care  Outcome: Ongoing, Progressing     Problem: Diabetes Comorbidity  Goal: Blood Glucose Level Within Targeted Range  Outcome: Ongoing, Progressing     Problem: Impaired Wound Healing  Goal: Optimal Wound Healing  Outcome: Ongoing, Progressing     Problem: Skin Injury Risk Increased  Goal: Skin Health and Integrity  Outcome: Ongoing, Progressing     Problem: Adjustment to Illness COPD (Chronic Obstructive Pulmonary Disease)  Goal: Optimal Chronic Illness Coping  Outcome: Ongoing, Progressing     Problem: Functional Ability Impaired COPD (Chronic Obstructive Pulmonary Disease)  Goal: Optimal Level of Functional [place]  Outcome: Ongoing, Progressing     Problem: Infection COPD (Chronic Obstructive Pulmonary Disease)  Goal: Absence of Infection Signs and Symptoms  Outcome: Ongoing, Progressing     Problem: Oral Intake Inadequate COPD (Chronic Obstructive Pulmonary Disease)  Goal: Improved Nutrition Intake  Outcome: Ongoing, Progressing     Problem: Respiratory Compromise COPD (Chronic Obstructive Pulmonary " Disease)  Goal: Effective Oxygenation and Ventilation  Outcome: Ongoing, Progressing     Problem: Adjustment to Illness (Heart Failure)  Goal: Optimal Coping  Outcome: Ongoing, Progressing     Problem: Cardiac Output Decreased (Heart Failure)  Goal: Optimal Cardiac Output  Outcome: Ongoing, Progressing     Problem: Dysrhythmia (Heart Failure)  Goal: Stable Heart Rate and Rhythm  Outcome: Ongoing, Progressing     Problem: Fluid Imbalance (Heart Failure)  Goal: Fluid Balance  Outcome: Ongoing, Progressing     Problem: Functional Ability Impaired (Heart Failure)  Goal: Optimal Functional Ability  Outcome: Ongoing, Progressing     Problem: Oral Intake Inadequate (Heart Failure)  Goal: Optimal Nutrition Intake  Outcome: Ongoing, Progressing     Problem: Respiratory Compromise (Heart Failure)  Goal: Effective Oxygenation and Ventilation  Outcome: Ongoing, Progressing     Problem: Sleep Disordered Breathing (Heart Failure)  Goal: Effective Breathing Pattern During Sleep  Outcome: Ongoing, Progressing     Problem: Chest Pain  Goal: Resolution of Chest Pain Symptoms  Outcome: Ongoing, Progressing     Problem: Fall Injury Risk  Goal: Absence of Fall and Fall-Related Injury  Outcome: Ongoing, Progressing

## 2023-08-29 NOTE — CARE UPDATE
Recurrent angina and transferred from OSH for LHC +/- pci. Will plan for LHC today.     --LHC +/- PCI, patient is a BANDAR candidate  - Anti-platelet Therapy: ASA / clopidogrel  - Access: Right CFA  - Catheters: JL4, JR4 (4F)  - Creatinine/CrCl: 1.7  - Allergies: No shellfish / Iodine allergy  - Pre-Hydration: NS  - Pre-Op Med: Bendaryl 50mg pO   - All patient's questions were answered.  -The risks, benefits and alternatives of the procedure were explained to the patient.   -The risks of coronary angiography include but are not limited to: bleeding, infection, heart rhythm abnormalities, allergic reactions, kidney injury and potential need for dialysis, stroke and death.   - Should stenting be indicated, the patient has agreed to dual anti-platelet therapy for 1-consecutive year with a drug-eluting stent and a minimum of 1-month with the use of a bare metal stent  - Additionally, pt is aware that non-compliance is likely to result in stent clotting with heart attack, heart failure, and/or death  -The risks of moderate sedation include hypotension, respiratory depression, arrhythmias, bronchospasm, and death.   - Informed consent was obtained and the  patient is agreeable to proceed with the procedure.

## 2023-08-29 NOTE — BRIEF OP NOTE
Brief Operative Note:    : Nba Riggs MD     Referring Physician: Dean Vanessa     All Operators: Surgeon(s):  Obed Valdez MD Jenkins, James S., MD Kim, Jin Wan, MD     Preoperative Diagnosis: Elevated troponin [R77.8]     Postop Diagnosis: Elevated troponin [R77.8]    Treatments/Procedures: Procedure(s) (LRB):  Stent, Drug Eluting, Single Vessel, Coronary (N/A)  Instantaneous Wave-Free Ratio (IFR) (N/A)    Findings:Severe coronary disease is present. See catheterization report for full details.    -severe stenosis to p-dLCx with iFR 0.74  - mRCA  -2.5 BANDAR x 4 to Lcx with high pressure inflation and stenosis reduced to 0    Estimated Blood loss: 20 cc    Specimens removed: No    Recommendations:   - Routine post-cath care as per orders  - IVF at 200 cc/hr for 4 hrs  - Cardiac rehab referral, Continue medical management, Risk factor reduction, Plavix for at least 1 year and ASA 81 mg indefinitely, Follow-up with outpatient cardiologist      Obed Valdez   I have seen the patient, reviewed the Fellow's history and physical, assessment and plan. I have personally interviewed and examined the patient and agree with the findings.     AIMEE Riggs MD

## 2023-08-30 ENCOUNTER — TELEPHONE (OUTPATIENT)
Dept: CARDIAC REHAB | Facility: CLINIC | Age: 78
End: 2023-08-30
Payer: MEDICARE

## 2023-08-30 ENCOUNTER — DOCUMENT SCAN (OUTPATIENT)
Dept: HOME HEALTH SERVICES | Facility: HOSPITAL | Age: 78
End: 2023-08-30
Payer: MEDICARE

## 2023-08-30 LAB
ALBUMIN SERPL BCP-MCNC: 2.4 G/DL (ref 3.5–5.2)
ALP SERPL-CCNC: 66 U/L (ref 55–135)
ALT SERPL W/O P-5'-P-CCNC: 5 U/L (ref 10–44)
ANION GAP SERPL CALC-SCNC: 12 MMOL/L (ref 8–16)
AST SERPL-CCNC: 11 U/L (ref 10–40)
BASOPHILS # BLD AUTO: 0 K/UL (ref 0–0.2)
BASOPHILS NFR BLD: 0 % (ref 0–1.9)
BILIRUB SERPL-MCNC: 0.2 MG/DL (ref 0.1–1)
BUN SERPL-MCNC: 48 MG/DL (ref 8–23)
CALCIUM SERPL-MCNC: 8.6 MG/DL (ref 8.7–10.5)
CHLORIDE SERPL-SCNC: 104 MMOL/L (ref 95–110)
CO2 SERPL-SCNC: 26 MMOL/L (ref 23–29)
CREAT SERPL-MCNC: 1.6 MG/DL (ref 0.5–1.4)
DIFFERENTIAL METHOD: ABNORMAL
EOSINOPHIL # BLD AUTO: 0 K/UL (ref 0–0.5)
EOSINOPHIL NFR BLD: 0 % (ref 0–8)
ERYTHROCYTE [DISTWIDTH] IN BLOOD BY AUTOMATED COUNT: 14.3 % (ref 11.5–14.5)
EST. GFR  (NO RACE VARIABLE): 33.2 ML/MIN/1.73 M^2
GLUCOSE SERPL-MCNC: 190 MG/DL (ref 70–110)
HCT VFR BLD AUTO: 29 % (ref 37–48.5)
HGB BLD-MCNC: 8.7 G/DL (ref 12–16)
IMM GRANULOCYTES # BLD AUTO: 0.03 K/UL (ref 0–0.04)
IMM GRANULOCYTES NFR BLD AUTO: 0.3 % (ref 0–0.5)
LYMPHOCYTES # BLD AUTO: 0.4 K/UL (ref 1–4.8)
LYMPHOCYTES NFR BLD: 4.8 % (ref 18–48)
MAGNESIUM SERPL-MCNC: 2.2 MG/DL (ref 1.6–2.6)
MCH RBC QN AUTO: 28.2 PG (ref 27–31)
MCHC RBC AUTO-ENTMCNC: 30 G/DL (ref 32–36)
MCV RBC AUTO: 94 FL (ref 82–98)
MONOCYTES # BLD AUTO: 0.2 K/UL (ref 0.3–1)
MONOCYTES NFR BLD: 2.4 % (ref 4–15)
NEUTROPHILS # BLD AUTO: 8.4 K/UL (ref 1.8–7.7)
NEUTROPHILS NFR BLD: 92.5 % (ref 38–73)
NRBC BLD-RTO: 0 /100 WBC
PHOSPHATE SERPL-MCNC: 4.4 MG/DL (ref 2.7–4.5)
PLATELET # BLD AUTO: 159 K/UL (ref 150–450)
PMV BLD AUTO: 12.9 FL (ref 9.2–12.9)
POC ACTIVATED CLOTTING TIME K: 185 SEC (ref 74–137)
POCT GLUCOSE: 107 MG/DL (ref 70–110)
POCT GLUCOSE: 132 MG/DL (ref 70–110)
POCT GLUCOSE: 151 MG/DL (ref 70–110)
POCT GLUCOSE: 173 MG/DL (ref 70–110)
POTASSIUM SERPL-SCNC: 4.2 MMOL/L (ref 3.5–5.1)
PROT SERPL-MCNC: 5.4 G/DL (ref 6–8.4)
RBC # BLD AUTO: 3.08 M/UL (ref 4–5.4)
SAMPLE: ABNORMAL
SODIUM SERPL-SCNC: 142 MMOL/L (ref 136–145)
WBC # BLD AUTO: 9.08 K/UL (ref 3.9–12.7)

## 2023-08-30 PROCEDURE — 94761 N-INVAS EAR/PLS OXIMETRY MLT: CPT

## 2023-08-30 PROCEDURE — 20600001 HC STEP DOWN PRIVATE ROOM

## 2023-08-30 PROCEDURE — 27000221 HC OXYGEN, UP TO 24 HOURS

## 2023-08-30 PROCEDURE — 83735 ASSAY OF MAGNESIUM: CPT

## 2023-08-30 PROCEDURE — 25000003 PHARM REV CODE 250

## 2023-08-30 PROCEDURE — 36415 COLL VENOUS BLD VENIPUNCTURE: CPT

## 2023-08-30 PROCEDURE — 97535 SELF CARE MNGMENT TRAINING: CPT

## 2023-08-30 PROCEDURE — 25000003 PHARM REV CODE 250: Performed by: STUDENT IN AN ORGANIZED HEALTH CARE EDUCATION/TRAINING PROGRAM

## 2023-08-30 PROCEDURE — 94640 AIRWAY INHALATION TREATMENT: CPT

## 2023-08-30 PROCEDURE — 99900035 HC TECH TIME PER 15 MIN (STAT)

## 2023-08-30 PROCEDURE — 25000242 PHARM REV CODE 250 ALT 637 W/ HCPCS

## 2023-08-30 PROCEDURE — 80053 COMPREHEN METABOLIC PANEL: CPT

## 2023-08-30 PROCEDURE — 63600175 PHARM REV CODE 636 W HCPCS

## 2023-08-30 PROCEDURE — 25000003 PHARM REV CODE 250: Performed by: INTERNAL MEDICINE

## 2023-08-30 PROCEDURE — 99233 PR SUBSEQUENT HOSPITAL CARE,LEVL III: ICD-10-PCS | Mod: ,,, | Performed by: INTERNAL MEDICINE

## 2023-08-30 PROCEDURE — 97165 OT EVAL LOW COMPLEX 30 MIN: CPT

## 2023-08-30 PROCEDURE — 99233 SBSQ HOSP IP/OBS HIGH 50: CPT | Mod: ,,, | Performed by: INTERNAL MEDICINE

## 2023-08-30 PROCEDURE — 84100 ASSAY OF PHOSPHORUS: CPT

## 2023-08-30 PROCEDURE — 85025 COMPLETE CBC W/AUTO DIFF WBC: CPT

## 2023-08-30 RX ORDER — NAPROXEN SODIUM 220 MG/1
81 TABLET, FILM COATED ORAL DAILY
Qty: 30 TABLET | Refills: 11 | Status: ON HOLD | OUTPATIENT
Start: 2023-08-31 | End: 2023-11-13 | Stop reason: HOSPADM

## 2023-08-30 RX ORDER — NITROGLYCERIN 0.4 MG/1
0.4 TABLET SUBLINGUAL EVERY 5 MIN PRN
Qty: 25 TABLET | Refills: 2 | Status: SHIPPED | OUTPATIENT
Start: 2023-08-30 | End: 2024-08-29

## 2023-08-30 RX ORDER — ENOXAPARIN SODIUM 100 MG/ML
40 INJECTION SUBCUTANEOUS EVERY 24 HOURS
Status: DISCONTINUED | OUTPATIENT
Start: 2023-08-30 | End: 2023-09-05 | Stop reason: HOSPADM

## 2023-08-30 RX ORDER — AMLODIPINE BESYLATE 10 MG/1
10 TABLET ORAL DAILY
Status: DISCONTINUED | OUTPATIENT
Start: 2023-08-30 | End: 2023-09-04

## 2023-08-30 RX ORDER — TALC
9 POWDER (GRAM) TOPICAL NIGHTLY PRN
Status: DISCONTINUED | OUTPATIENT
Start: 2023-08-30 | End: 2023-09-05

## 2023-08-30 RX ORDER — CLOPIDOGREL BISULFATE 75 MG/1
75 TABLET ORAL DAILY
Qty: 30 TABLET | Refills: 11 | Status: SHIPPED | OUTPATIENT
Start: 2023-08-30 | End: 2024-08-29

## 2023-08-30 RX ORDER — METOPROLOL SUCCINATE 200 MG/1
200 TABLET, EXTENDED RELEASE ORAL DAILY
Qty: 30 TABLET | Refills: 5 | Status: SHIPPED | OUTPATIENT
Start: 2023-08-30 | End: 2024-08-29

## 2023-08-30 RX ORDER — AMLODIPINE BESYLATE 10 MG/1
10 TABLET ORAL DAILY
Qty: 30 TABLET | Refills: 11 | Status: SHIPPED | OUTPATIENT
Start: 2023-08-31 | End: 2024-08-30

## 2023-08-30 RX ADMIN — METOPROLOL TARTRATE 75 MG: 50 TABLET, FILM COATED ORAL at 09:08

## 2023-08-30 RX ADMIN — CLOPIDOGREL BISULFATE 75 MG: 75 TABLET ORAL at 09:08

## 2023-08-30 RX ADMIN — IPRATROPIUM BROMIDE AND ALBUTEROL SULFATE 3 ML: .5; 3 SOLUTION RESPIRATORY (INHALATION) at 02:08

## 2023-08-30 RX ADMIN — ISOSORBIDE MONONITRATE 30 MG: 30 TABLET, EXTENDED RELEASE ORAL at 09:08

## 2023-08-30 RX ADMIN — ENOXAPARIN SODIUM 40 MG: 40 INJECTION SUBCUTANEOUS at 05:08

## 2023-08-30 RX ADMIN — ASPIRIN 81 MG 81 MG: 81 TABLET ORAL at 09:08

## 2023-08-30 RX ADMIN — AMLODIPINE BESYLATE 10 MG: 10 TABLET ORAL at 11:08

## 2023-08-30 RX ADMIN — IPRATROPIUM BROMIDE AND ALBUTEROL SULFATE 3 ML: .5; 3 SOLUTION RESPIRATORY (INHALATION) at 01:08

## 2023-08-30 RX ADMIN — CLOPIDOGREL BISULFATE 525 MG: 300 TABLET, FILM COATED ORAL at 11:08

## 2023-08-30 RX ADMIN — ACETAMINOPHEN 650 MG: 325 TABLET ORAL at 01:08

## 2023-08-30 RX ADMIN — Medication 9 MG: at 11:08

## 2023-08-30 RX ADMIN — METOPROLOL TARTRATE 75 MG: 50 TABLET, FILM COATED ORAL at 03:08

## 2023-08-30 NOTE — ASSESSMENT & PLAN NOTE
78 yoF admitted for ongoing chest pain. Noted to have elevated troponins at OSH but here had troponin of 0.013. patient has some chest pain but only when anxious. Patient has severe multivessel disease. Patient transferred for LHC. Patient underwent LHC which showed severe stenosis of Lcx with iFR 0.74 and  of the mRCA. Patient had BANDAR to Lcx.      - continue DAPT

## 2023-08-30 NOTE — HOSPITAL COURSE
Patient admitted to CCU for further management. Patient underwent LHC which showed severe stenosis of Lcx with iFR 0.74 and  of the mRCA. Patient had BANDAR to Lcx. PT/OT recommending SNF. Patient became anxious, VBG showed hypercapnia. Bipap ordered and patient transferred to ICU but patient never obtained Bipap. SOB and anxiety improved and patient was stepped back down. Had fever on 9/4/23. Reports of loose stool c. Diff was ordered. No history of abx use. Patient has a history of cholecystectomy in the past.  She normally takes  cholestyramine-aspartame for her diarrhea at home.  We will restart prior to admission. Patient stable to discharge to LTACH .

## 2023-08-30 NOTE — SUBJECTIVE & OBJECTIVE
Interval History: patient tolerated procedure well. Patient becomes anxious easily but had no other complaints. NAEON and VSS.     Review of Systems   Constitutional: Negative for chills, diaphoresis, fever, malaise/fatigue, weight gain and weight loss.   HENT:  Negative for ear pain, nosebleeds and odynophagia.    Eyes:  Negative for blurred vision, double vision, vision loss in left eye, vision loss in right eye and visual disturbance.   Cardiovascular:  Negative for chest pain, dyspnea on exertion, leg swelling, near-syncope, palpitations and syncope.   Respiratory:  Negative for shortness of breath and wheezing.    Hematologic/Lymphatic: Negative for bleeding problem. Does not bruise/bleed easily.   Skin:  Negative for poor wound healing and rash.   Musculoskeletal:  Negative for back pain, joint pain and neck pain.   Gastrointestinal:  Negative for abdominal pain, change in bowel habit, constipation, diarrhea, hematemesis, hematochezia, melena, nausea and vomiting.   Genitourinary:  Negative for dysuria, flank pain, hematuria and pelvic pain.   Neurological:  Negative for dizziness, focal weakness, headaches, light-headedness, seizures and weakness.   Psychiatric/Behavioral:  Negative for memory loss. The patient is nervous/anxious. The patient does not have insomnia.      Objective:     Vital Signs (Most Recent):  Temp: 98.1 °F (36.7 °C) (08/30/23 1146)  Pulse: 66 (08/30/23 1146)  Resp: 20 (08/30/23 1146)  BP: (!) 167/72 (08/30/23 1146)  SpO2: (!) 93 % (08/30/23 1146) Vital Signs (24h Range):  Temp:  [97.6 °F (36.4 °C)-98.5 °F (36.9 °C)] 98.1 °F (36.7 °C)  Pulse:  [62-81] 66  Resp:  [16-20] 20  SpO2:  [93 %-100 %] 93 %  BP: (134-190)/(63-86) 167/72     Weight: 81 kg (178 lb 9.2 oz)  Body mass index is 31.63 kg/m².     SpO2: (!) 93 %         Intake/Output Summary (Last 24 hours) at 8/30/2023 1159  Last data filed at 8/30/2023 0932  Gross per 24 hour   Intake --   Output 3100 ml   Net -3100 ml        Lines/Drains/Airways       Drain  Duration             Female External Urinary Catheter 08/29/23 1703 <1 day              Peripheral Intravenous Line  Duration                  Peripheral IV - Single Lumen 08/27/23 2145 20 G Anterior;Left Forearm 2 days         Peripheral IV - Single Lumen 08/28/23 1605 20 G Anterior;Left Wrist 1 day                       Physical Exam  Vitals and nursing note reviewed.   Constitutional:       General: She is not in acute distress.     Appearance: Normal appearance. She is obese. She is not ill-appearing or diaphoretic.   HENT:      Head: Normocephalic and atraumatic.      Nose: Nose normal.      Mouth/Throat:      Mouth: Mucous membranes are moist.      Pharynx: Oropharynx is clear.   Eyes:      Extraocular Movements: Extraocular movements intact.   Cardiovascular:      Rate and Rhythm: Normal rate and regular rhythm.      Pulses: Normal pulses.      Heart sounds: Murmur heard.   Pulmonary:      Effort: Pulmonary effort is normal.      Breath sounds: Normal breath sounds. No wheezing or rales.   Abdominal:      General: Bowel sounds are normal.      Palpations: Abdomen is soft.      Tenderness: There is no abdominal tenderness.      Hernia: A hernia (R hernia abdominal hernia, reducable, non-painful) is present.   Musculoskeletal:         General: Normal range of motion.      Cervical back: Normal range of motion and neck supple.      Right lower leg: No edema.      Left lower leg: No edema.   Skin:     General: Skin is warm and dry.   Neurological:      General: No focal deficit present.      Mental Status: She is alert and oriented to person, place, and time.   Psychiatric:         Mood and Affect: Mood normal.         Behavior: Behavior normal.            Significant Labs: CMP   Recent Labs   Lab 08/29/23  0855 08/30/23  0325    142   K 5.2* 4.2    104   CO2 32* 26   GLU 83 190*   BUN 51* 48*   CREATININE 1.7* 1.6*   CALCIUM 8.7 8.6*   PROT 5.6* 5.4*   ALBUMIN  2.6* 2.4*   BILITOT 0.3 0.2   ALKPHOS 70 66   AST 13 11   ALT 9* 5*   ANIONGAP 6* 12   , CBC   Recent Labs   Lab 08/29/23  0855 08/30/23  0325   WBC 10.76 9.08   HGB 8.5* 8.7*   HCT 29.1* 29.0*    159   , Troponin   Recent Labs   Lab 08/29/23  0855   TROPONINI 0.013   , and All pertinent lab results from the last 24 hours have been reviewed.    Significant Imaging:  all prior imaging reviewed

## 2023-08-30 NOTE — ASSESSMENT & PLAN NOTE
Patient with Hypoxic Respiratory failure which is Chronic.  she is on home oxygen at 4 LPM. Supplemental oxygen was provided and noted-      .   Signs/symptoms of respiratory failure include- tachypnea. Contributing diagnoses includes - CHF and COPD Labs and images were reviewed. Patient Has not had a recent ABG. Will treat underlying causes and adjust management of respiratory failure as follows- continue home O2

## 2023-08-30 NOTE — LETTER
August 30, 2023    Marisol Kerr  [address] [address]             [facility]  [address]  [place] [address]  Phone: [phone]                                  [facility]   [address]   [place] [address]  [phone]         [facility]   [address] [place] [address] [phone]         [facility]    [address]  [place] [address] [phone]   Re: Marisol Kerr  Clinic number: [id number]    Dear Ms. Kerr:    You were recently admitted to an [facility] facility for cardiac (heart) problem.  Your physician has referred you to [facility].  Cardiac Rehab Phase 2 is an educational and exercise program, conducted in a outpatient setting, proven to help reduce your risk for recurrent heart events.    Cardiac rehab has two major parts:    1. Exercise training to help you achieve cardiovascular fitness while learning how to exercise safely and improve muscle strength and endurance.  Your exercise prescription will be based on the results of the cardiopulmonary stress test (CPX) which will be done before entering the program and at completion.  2. Education, counseling and training to help you understand your heart condition and find ways to reduce your risk of future heart problems.  The cardiac rehab team will help you learn how to cope with the stress of adjusting to a new lifestyle and to deal with your fears about the future.    Phase 2 is a 36-session program, meeting 3 times a week for 12 weeks.  Each session consists of an hour of exercise and half-hour dedicated to the educational topic of the day.  Class days vary per location.  Please contact your nearest facility for details.    Through cardiac rehab you will learn:  About your heart condition, medical therapies, and medication  Risk factors in y our lifestyle contributing to heart disease  New  strategies to modify your risk factors  About a healthy diet that can lower your blood cholesterol, control weight, help prevent or control high blood pressure, and diabetes  How to stop smoking  How to manage stress    If you are interested in getting started, call the [facility] of your choosing.     Sincerely,     [facility]

## 2023-08-30 NOTE — PT/OT/SLP EVAL
Occupational Therapy   Evaluation    Name: Marisol Kerr  MRN: [id number]  Admitting Diagnosis: Acute on chronic respiratory failure  Recent Surgery: Procedure(s) (LRB):  Stent, Drug Eluting, Single Vessel, Coronary (N/A)  Instantaneous Wave-Free Ratio (IFR) (N/A)  ANGIOGRAM, CORONARY ARTERY (N/A) 1 Day Post-Op    Recommendations:     Discharge Recommendations: nursing facility, skilled  Discharge Equipment Recommendations:  none  Barriers to discharge:  Decreased caregiver support    Assessment:     Marisol Kerr is a 78 y.o. female with a medical diagnosis of Acute on chronic respiratory failure. Pt. currently demonstrates decreased (I) with ADLs, functional mobility & t/fs decreased overall strength, endurance and balance. Pt would benefit from skilled OT services to address these deficits and to facilitate improving (I) with daily tasks.  Performance deficits affecting function: weakness, impaired endurance, impaired self care skills, impaired functional mobility, gait instability, impaired balance, decreased safety awareness, decreased coordination.      Rehab Prognosis: Good; patient would benefit from acute skilled OT services to address these deficits and reach maximum level of function.       Plan:     Patient to be seen 3 x/week to address the above listed problems via self-care/home management, therapeutic activities, therapeutic exercises  Plan of Care Expires: 09/29/23  Plan of Care Reviewed with: patient    Subjective     Chief Complaint: SOB  Patient/Family Comments/goals: To go to SNF.    Occupational Profile:  Living Environment: Patient lives alone in a [facility].   Previous level of function: Patient was Mod I with ADLs and ambulatory using RW and cane.  Equipment Used at Home: walker, rolling, rollator, cane, straight, oxygen  Assistance upon Discharge: Patient will have very limited assistance from daughter who lives in [place], [place].     Pain/Comfort:  Pain Rating 1: 0/10    Patients cultural, spiritual,  Moravian conflicts given the current situation:      Objective:     Communicated with: rn prior to session.  Patient found up in chair with oxygen, peripheral IV upon OT entry to room.    General Precautions: Standard, fall  Orthopedic Precautions:    Braces:    Respiratory Status: Nasal cannula, flow 4 L/min    Occupational Performance:    Bed Mobility:    Up in chair.    Functional Mobility/Transfers:  Patient completed Sit <> Stand Transfer with minimum assistance  with  rolling walker   Patient completed Toilet Transfer Step Transfer technique with minimum assistance with  rolling walker  Functional Mobility: Pt walked 10' x 2 from the chair<>bathroom with SBA / RW.    Activities of Daily Living:  Grooming: stand by assistance standing at the sink.  Lower Body Dressing: total assistance  Toileting: moderate assistance for diaper management.    Cognitive/Visual Perceptual:  Cognitive/Psychosocial Skills:     -       Oriented to: Person, Place, Time, and Situation   -       Safety awareness/insight to disability: intact     Physical Exam:  BUE AROM/MMT: WNL    AMPAC 6 Click ADL:  AMPAC Total Score: 19    Treatment & Education:  UE ROM/MMT  Bed mobility training / assessment  Functional mobility assessment  Sit/standing balance assessment  Educated on importance of sitting OOB in bedside chair to promote increased strength, endurance & breathing.  Discussed OT POC / Post-acute plan    Patient left up in chair with all lines intact and call button in reach    GOALS:   Multidisciplinary Problems       Occupational Therapy Goals          Problem: Occupational Therapy    Goal Priority Disciplines Outcome Interventions   Occupational Therapy Goal     OT, PT/OT Ongoing, Progressing    Description: Goals to be met by: 9/6/23    Patient will increase functional independence with ADLs by performing:    Grooming while standing at sink with Supervision.  Toileting from toilet with Supervision for hygiene and clothing  management.   Supine to sit with Modified Bates.  Toilet transfer to toilet with Supervision.                         History:     Past Medical History:   Diagnosis Date    CAD (coronary artery disease)     Cancer     colon    CHF (congestive heart failure)     Colitis     Colon cancer     COPD (chronic obstructive pulmonary disease)     Decreased hearing     Diabetes mellitus     Diabetes mellitus, type 2     Hypercholesterolemia     Hypertension     Hypoxemia     Insomnia     Obesity     Osteoarthritis          Past Surgical History:   Procedure Laterality Date    ANGIOGRAM, CORONARY, WITH LEFT HEART CATHETERIZATION N/A 2/10/2022    Procedure: Angiogram, Coronary, with Left Heart Cath;  Surgeon: Cristian Benjamin MD;  [facility] CATH[facility]EP LAB;  Service: Cardiology;  Laterality: N/A;    CARDIAC CATHETERIZATION      CHOLECYSTECTOMY      COLECTOMY      CORONARY ANGIOPLASTY      CORONARY STENT PLACEMENT      ERCP N/A 1/16/2023    Procedure: ERCP (ENDOSCOPIC RETROGRADE CHOLANGIOPANCREATOGRAPHY);  Surgeon: Biju Kramer III, MD;  Location: [facility];  Service: Endoscopy;  Laterality: N/A;    ESOPHAGOGASTRODUODENOSCOPY N/A 1/29/2023    Procedure: EGD (ESOPHAGOGASTRODUODENOSCOPY);  Surgeon: Aarti Alvarez MD;  Location: [facility];  Service: Endoscopy;  Laterality: N/A;    EXTERNAL EAR SURGERY      EYE SURGERY      FLEXIBLE SIGMOIDOSCOPY N/A 9/19/2019    Procedure: SIGMOIDOSCOPY, FLEXIBLE;  Surgeon: Biju Kramer III, MD;  Location: [facility];  Service: Endoscopy;  Laterality: N/A;    HYSTERECTOMY      partial       Time Tracking:     OT Date of Treatment: 08/30/23  OT Start Time: 1301  OT Stop Time: 1330  OT Total Time (min): 29 min    Billable Minutes:Evaluation 15  Self Care/Home Management 14    8/30/2023

## 2023-08-30 NOTE — PROGRESS NOTES
[facility]  Cardiology  Progress Note    Patient Name: Marisol Kerr  MRN: [id number]  Admission Date: 8/29/2023  Hospital Length of Stay: 1 days  Code Status: Full Code   Attending Physician: Nba Riggs MD   Primary Care Physician: Khadar Dwyer MD  Expected Discharge Date: 8/30/2023  Principal Problem:Acute on chronic respiratory failure    Subjective:     Hospital Course:   Patient admitted to CCU for further management. Patient underwent LHC which showed severe stenosis of Lcx with iFR 0.74 and  of the mRCA. Patient had BANDAR to Lcx. PT/OT pending evaluation for discharge      Interval History: patient tolerated procedure well. Patient becomes anxious easily but had no other complaints. NAEON and VSS.     Review of Systems   Constitutional: Negative for chills, diaphoresis, fever, malaise/fatigue, weight gain and weight loss.   HENT:  Negative for ear pain, nosebleeds and odynophagia.    Eyes:  Negative for blurred vision, double vision, vision loss in left eye, vision loss in right eye and visual disturbance.   Cardiovascular:  Negative for chest pain, dyspnea on exertion, leg swelling, near-syncope, palpitations and syncope.   Respiratory:  Negative for shortness of breath and wheezing.    Hematologic/Lymphatic: Negative for bleeding problem. Does not bruise/bleed easily.   Skin:  Negative for poor wound healing and rash.   Musculoskeletal:  Negative for back pain, joint pain and neck pain.   Gastrointestinal:  Negative for abdominal pain, change in bowel habit, constipation, diarrhea, hematemesis, hematochezia, melena, nausea and vomiting.   Genitourinary:  Negative for dysuria, flank pain, hematuria and pelvic pain.   Neurological:  Negative for dizziness, focal weakness, headaches, light-headedness, seizures and weakness.   Psychiatric/Behavioral:  Negative for memory loss. The patient is nervous/anxious. The patient does not have insomnia.      Objective:     Vital Signs (Most  Recent):  Temp: 98.1 °F (36.7 °C) (08/30/23 1146)  Pulse: 66 (08/30/23 1146)  Resp: 20 (08/30/23 1146)  BP: (!) 167/72 (08/30/23 1146)  SpO2: (!) 93 % (08/30/23 1146) Vital Signs (24h Range):  Temp:  [97.6 °F (36.4 °C)-98.5 °F (36.9 °C)] 98.1 °F (36.7 °C)  Pulse:  [62-81] 66  Resp:  [16-20] 20  SpO2:  [93 %-100 %] 93 %  BP: (134-190)/(63-86) 167/72     Weight: 81 kg (178 lb 9.2 oz)  Body mass index is 31.63 kg/m².     SpO2: (!) 93 %         Intake/Output Summary (Last 24 hours) at 8/30/2023 1159  Last data filed at 8/30/2023 0932  Gross per 24 hour   Intake --   Output 3100 ml   Net -3100 ml       Lines/Drains/Airways       Drain  Duration             Female External Urinary Catheter 08/29/23 1703 <1 day              Peripheral Intravenous Line  Duration                  Peripheral IV - Single Lumen 08/27/23 2145 20 G Anterior;Left Forearm 2 days         Peripheral IV - Single Lumen 08/28/23 1605 20 G Anterior;Left Wrist 1 day                       Physical Exam  Vitals and nursing note reviewed.   Constitutional:       General: She is not in acute distress.     Appearance: Normal appearance. She is obese. She is not ill-appearing or diaphoretic.   HENT:      Head: Normocephalic and atraumatic.      Nose: Nose normal.      Mouth/Throat:      Mouth: Mucous membranes are moist.      Pharynx: Oropharynx is clear.   Eyes:      Extraocular Movements: Extraocular movements intact.   Cardiovascular:      Rate and Rhythm: Normal rate and regular rhythm.      Pulses: Normal pulses.      Heart sounds: Murmur heard.   Pulmonary:      Effort: Pulmonary effort is normal.      Breath sounds: Normal breath sounds. No wheezing or rales.   Abdominal:      General: Bowel sounds are normal.      Palpations: Abdomen is soft.      Tenderness: There is no abdominal tenderness.      Hernia: A hernia (R hernia abdominal hernia, reducable, non-painful) is present.   Musculoskeletal:         General: Normal range of motion.      Cervical  back: Normal range of motion and neck supple.      Right lower leg: No edema.      Left lower leg: No edema.   Skin:     General: Skin is warm and dry.   Neurological:      General: No focal deficit present.      Mental Status: She is alert and oriented to person, place, and time.   Psychiatric:         Mood and Affect: Mood normal.         Behavior: Behavior normal.            Significant Labs: CMP   Recent Labs   Lab 08/29/23  0855 08/30/23  0325    142   K 5.2* 4.2    104   CO2 32* 26   GLU 83 190*   BUN 51* 48*   CREATININE 1.7* 1.6*   CALCIUM 8.7 8.6*   PROT 5.6* 5.4*   ALBUMIN 2.6* 2.4*   BILITOT 0.3 0.2   ALKPHOS 70 66   AST 13 11   ALT 9* 5*   ANIONGAP 6* 12   , CBC   Recent Labs   Lab 08/29/23  0855 08/30/23  0325   WBC 10.76 9.08   HGB 8.5* 8.7*   HCT 29.1* 29.0*    159   , Troponin   Recent Labs   Lab 08/29/23  0855   TROPONINI 0.013   , and All pertinent lab results from the last 24 hours have been reviewed.    Significant Imaging:  all prior imaging reviewed    Assessment and Plan:       * Acute on chronic respiratory failure  Patient with Hypoxic Respiratory failure which is Chronic.  she is on home oxygen at 4 LPM. Supplemental oxygen was provided and noted-      .   Signs/symptoms of respiratory failure include- tachypnea. Contributing diagnoses includes - CHF and COPD Labs and images were reviewed. Patient Has not had a recent ABG. Will treat underlying causes and adjust management of respiratory failure as follows- continue home O2    Chronic diastolic congestive heart failure  Patient is identified as having Diastolic (HFpEF) heart failure that is Chronic. CHF is currently controlled. Latest ECHO performed and demonstrates- Results for orders placed during the hospital encounter of 08/25/23    Echo Saline Bubble? No    Interpretation Summary    Left Ventricle: The left ventricle is normal in size. Moderately increased ventricular mass. Moderately increased wall thickness. There  is moderate concentrict hypertrophy. Normal wall motion. There is normal systolic function.  EF 61% There is normal diastolic function.    Left Atrium: Left atrium is moderately dilated.    Right Ventricle: Normal right ventricular cavity size. Wall thickness is normal. Right ventricle wall motion  is normal. Systolic function is normal.    Mitral Valve: Mildly thickened anterior leaflet. Mild mitral annular calcification.    IVC/SVC: Normal venous pressure at 3 mmHg.    Pericardium: There is an effusion.    Limited echo; no gross pulmonary hypertension but poor visualization of tricuspid signal    No tissue Doppler performed to assess mean left atrial pressure  . Continue Beta Blocker, Furosemide and Nitrate/Vasodilator and monitor clinical status closely. Monitor on telemetry. Patient is off CHF pathway.  Monitor strict Is&Os and daily weights.  Place on fluid restriction of 1.5 L. Continue to stress to patient importance of self efficacy and  on diet for CHF. Last BNP reviewed- and noted below   Recent Labs   Lab 08/29/23  0855   *   .    Chronic kidney disease, stage 4 (severe)  Patient with chronic CKD baseline Cr 1.5-1.7.     - avoid nephrotoxic agents or renally dose medications  - trend Cr    Gastroesophageal reflux disease without esophagitis  Continue ppi    DM type 2, controlled, with complication  Last A1c on 8/25 showed 5.0. BG slightly elevated.     - LDSSI  - POCT glucose    Essential hypertension, benign  Will restart amlodipine    Hypercholesterolemia  Statin held at OSH    - will resume when clinically indicated    Debility  Patient mostly bedbound. Had rehab in the past. Will have PT/OT eval for discharge needs    Unstable angina  78 yoF admitted for ongoing chest pain. Noted to have elevated troponins at OSH but here had troponin of 0.013. patient has some chest pain but only when anxious. Patient has severe multivessel disease. Patient transferred for [facility]. Patient underwent  [facility] which showed severe stenosis of Lcx with iFR 0.74 and  of the mRCA. Patient had BANDAR to Lcx.      - continue DAPT        VTE Risk Mitigation (From admission, onward)         Ordered     enoxaparin injection 40 mg  Every 24 hours         08/30/23 0922     IP VTE HIGH RISK PATIENT  Once         08/29/23 0730     Place sequential compression device  Until discontinued         08/29/23 0730                Becca Jacobson MD  Cardiology  [facility] Stepdown

## 2023-08-30 NOTE — PLAN OF CARE
Problem: Occupational Therapy  Goal: Occupational Therapy Goal  Description: Goals to be met by: 9/6/23    Patient will increase functional independence with ADLs by performing:    Grooming while standing at sink with Supervision.  Toileting from toilet with Supervision for hygiene and clothing management.   Supine to sit with Modified [place].  Toilet transfer to toilet with Supervision.    Outcome: Ongoing, Progressing

## 2023-08-30 NOTE — PLAN OF CARE
NURSING HOME ORDERS    08/30/2023  [facility]  [facility]BBIE [facility] - CARDIOLOGY STEPDOWN  [address]  [facility] [address]  Dept: [phone]  Loc: [phone]     Admit to Nursing Home:  skilled nursing facility    Diagnoses:  Active Hospital Problems    Diagnosis  POA    *Acute on chronic respiratory failure [J96.20]  Unknown    Chronic diastolic congestive heart failure [I50.32]  Yes     Priority: 2     Chronic kidney disease, stage 4 (severe) [N18.4]  Yes     Priority: 2     DM type 2, controlled, with complication [E11.8]  Yes     Priority: 3     Gastroesophageal reflux disease without esophagitis [K21.9]  Yes     Priority: 3     Hypercholesterolemia [E78.00]  Yes     Priority: 3     Essential hypertension, benign [I10]  Yes     Priority: 3     ASCVD (arteriosclerotic cardiovascular disease) [I25.10]  Yes     Priority: 4     Debility [R53.81]  Yes    Unstable angina [I20.0]  Yes      Resolved Hospital Problems    Diagnosis Date Resolved POA    CAD (coronary artery disease) [I25.10] 08/28/2023 Yes       Patient is homebound due to:  Acute on chronic respiratory failure    Allergies:  Review of patient's allergies indicates:   Allergen Reactions    Iodinated contrast media     Strawberries [strawberry] Hives and Itching       Vitals:  Routine    Diet: cardiac diet    Activities:   Activity as tolerated    Goals of Care Treatment Preferences:  Code Status: Full Code          What is most important right now is to focus on extending life as long as possible, even it it means sacrificing quality.  Accordingly, we have decided that the best plan to meet the patient's goals includes continuing with treatment.      Labs:  per facility    Nursing Precautions:  Aspiration , Fall, and Pressure ulcer prevention    Consults:   PT to evaluate and treat- 3 times a week and OT to evaluate and treat- 3 times a week     Miscellaneous Care: Routine Skin for Bedridden Patients:  Apply moisture barrier cream  to all                   Diabetes Care:  SN to perform and educate Diabetic management with blood glucose monitoring:      Medications: Discontinue all previous medication orders, if any. See new list below.     Medication List        START taking these medications      amLODIPine 10 MG tablet  Commonly known as: NORVASC  Take 1 tablet (10 mg total) by mouth once daily.  Start taking on: August 31, 2023     aspirin 81 MG Chew  Chew and swallow 1 tablet (81 mg total) by mouth once daily.  Start taking on: August 31, 2023     metoprolol succinate 200 MG 24 hr tablet  Commonly known as: TOPROL-XL  Take 1 tablet (200 mg total) by mouth once daily.     nitroGLYCERIN 0.4 MG SL tablet  Commonly known as: NITROSTAT  Place 1 tablet (0.4 mg total) under the tongue every 5 (five) minutes as needed for Chest pain. Up to 3 doses. If chest pain is not relieved or worsens 3 to 5 minutes after 1 dose, call 911 and seek immediate emergency medical attention.  Replaces: nitroGLYCERIN 0.4 MG/DOSE TL SPRY 400 mcg/spray spray            CONTINUE taking these medications      albuterol 90 mcg/actuation inhaler  Commonly known as: VENTOLIN HFA  Inhale 2 puffs into the lungs every 6 (six) hours as needed for Wheezing or Shortness of Breath. Rescue     * albuterol-ipratropium 2.5 mg-0.5 mg/3 mL nebulizer solution  Commonly known as: DUO-NEB  Take 3 mLs by nebulization every 4 (four) hours as needed for Wheezing or Shortness of Breath. Rescue     * CombiVENT RESPIMAT  mcg/actuation inhaler  Generic drug: ipratropium-albuteroL  Inhale 2 puffs into the lungs every 4 (four) hours as needed for Shortness of Breath. Rescue     allopurinoL 100 MG tablet  Commonly known as: ZYLOPRIM  Take 0.5 tablets (50 mg total) by mouth once daily.     ALPRAZolam 0.25 MG tablet  Commonly known as: XANAX  Take 1 tablet (0.25 mg total) by mouth every evening.     atorvastatin 40 MG tablet  Commonly known as: LIPITOR  Take 1 tablet (40 mg total) by mouth once  daily.     clopidogreL 75 mg tablet  Commonly known as: PLAVIX  Take 1 tablet (75 mg total) by mouth once daily.     coenzyme Q10 100 mg capsule  Take 100 mg by mouth every morning.     ergocalciferol 50,000 unit Cap  Commonly known as: VITAMIN D2  Take 1 capsule (50,000 Units total) by mouth every 7 days.     ferrous sulfate 325 (65 FE) MG EC tablet  Take 325 mg by mouth once daily.     fish oil-omega-3 fatty acids 300-1,000 mg capsule  Take 2 capsules by mouth every morning.     fluticasone-salmeterol 250-50 mcg/dose 250-50 mcg/dose diskus inhaler  Commonly known as: ADVAIR  Inhale 1 puff into the lungs 2 (two) times a day.     furosemide 20 MG tablet  Commonly known as: LASIX  Take 1 tablet (20 mg total) by mouth every other day. TAKE WITH POTASSIUM     INCRUSE ELLIPTA 62.5 mcg/actuation inhalation capsule  Generic drug: umeclidinium  Inhale 62.5 mcg into the lungs every morning. Controller     ketoconazole 2 % cream  Commonly known as: NIZORAL  Apply 1 application  topically 2 (two) times daily.     magnesium oxide 400 mg (241.3 mg magnesium) tablet  Commonly known as: MAG-OX  Take 1 tablet by mouth 2 (two) times daily.     mupirocin 2 % ointment  Commonly known as: BACTROBAN  Apply topically 2 (two) times daily.     pantoprazole 40 MG tablet  Commonly known as: PROTONIX  Take 1 tablet (40 mg total) by mouth once daily.     potassium chloride 10 MEQ Cpsr  Commonly known as: MICRO-K  Take 1 capsule (10 mEq total) by mouth every other day. (TAKE WITH LASIX/FUROSEMIDE)     PRESERVISION AREDS-2 ORAL  Take 1 tablet by mouth once daily.     triamcinolone acetonide 0.1% 0.1 % cream  Commonly known as: KENALOG  Apply 1 g topically 2 (two) times daily.     VITAMIN C 500 MG tablet  Generic drug: ascorbic acid (vitamin C)  Take 500 mg by mouth 2 (two) times daily.           * This list has 2 medication(s) that are the same as other medications prescribed for you. Read the directions carefully, and ask your doctor or other  care provider to review them with you.                STOP taking these medications      CHOLESTYRAMINE LIGHT 4 gram Pwpk  Generic drug: cholestyramine-aspartame     colchicine (gout) 0.6 mg tablet  Commonly known as: COLCRYS     isosorbide mononitrate 30 MG 24 hr tablet  Commonly known as: IMDUR     metoprolol tartrate 75 mg Tab     nitroGLYCERIN 0.4 MG/DOSE TL SPRY 400 mcg/spray spray  Commonly known as: NITROLINGUAL  Replaced by: nitroGLYCERIN 0.4 MG SL tablet     ranolazine 500 MG Tb12  Commonly known as: RANEXA                Immunizations Administered as of 8/30/2023       Name Date Dose VIS Date Route Exp Date    COVID-19, MRNA, LN-S, PF (Moderna) 10/28/2021 -- -- Intramuscular --    Site: Left arm     Lot: [id number]     COVID-19, MRNA, LN-S, PF (Moderna) 2/10/2021 -- -- Intramuscular --    Site: Left arm     Lot: [id number]     COVID-19, MRNA, LN-S, PF (Moderna) 1/13/2021 -- -- Intramuscular --    Site: Left arm     Lot: [id number]             This patient has had both covid vaccinations    Some patients may experience side effects after vaccination.  These may include fever, headache, muscle or joint aches.  Most symptoms resolve with 24-48 hours and do not require urgent medical evaluation unless they persist for more than 72 hours or symptoms are concerning for an unrelated medical condition.          _________________________________  Becca Jacobson MD  08/30/2023

## 2023-08-30 NOTE — TELEPHONE ENCOUNTER
Letter regarding Phase II cardiac rehab was sent to patient, along with telephone # for  [facility] cardiac rehab

## 2023-08-31 LAB
ALBUMIN SERPL BCP-MCNC: 2.3 G/DL (ref 3.5–5.2)
ALLENS TEST: ABNORMAL
ALP SERPL-CCNC: 74 U/L (ref 55–135)
ALT SERPL W/O P-5'-P-CCNC: <5 U/L (ref 10–44)
ANION GAP SERPL CALC-SCNC: 5 MMOL/L (ref 8–16)
AST SERPL-CCNC: 12 U/L (ref 10–40)
BASOPHILS # BLD AUTO: 0.02 K/UL (ref 0–0.2)
BASOPHILS NFR BLD: 0.2 % (ref 0–1.9)
BILIRUB SERPL-MCNC: 0.2 MG/DL (ref 0.1–1)
BNP SERPL-MCNC: 697 PG/ML (ref 0–99)
BUN SERPL-MCNC: 59 MG/DL (ref 8–23)
CALCIUM SERPL-MCNC: 8.4 MG/DL (ref 8.7–10.5)
CHLORIDE SERPL-SCNC: 106 MMOL/L (ref 95–110)
CO2 SERPL-SCNC: 31 MMOL/L (ref 23–29)
CREAT SERPL-MCNC: 1.6 MG/DL (ref 0.5–1.4)
DELSYS: ABNORMAL
DIFFERENTIAL METHOD: ABNORMAL
EOSINOPHIL # BLD AUTO: 0.1 K/UL (ref 0–0.5)
EOSINOPHIL NFR BLD: 0.8 % (ref 0–8)
ERYTHROCYTE [DISTWIDTH] IN BLOOD BY AUTOMATED COUNT: 14.4 % (ref 11.5–14.5)
EST. GFR  (NO RACE VARIABLE): 33.2 ML/MIN/1.73 M^2
GLUCOSE SERPL-MCNC: 79 MG/DL (ref 70–110)
HCT VFR BLD AUTO: 27.1 % (ref 37–48.5)
HCT VFR BLD CALC: 27 %PCV (ref 36–54)
HGB BLD-MCNC: 7.9 G/DL (ref 12–16)
IMM GRANULOCYTES # BLD AUTO: 0.04 K/UL (ref 0–0.04)
IMM GRANULOCYTES NFR BLD AUTO: 0.4 % (ref 0–0.5)
LYMPHOCYTES # BLD AUTO: 2.1 K/UL (ref 1–4.8)
LYMPHOCYTES NFR BLD: 18.8 % (ref 18–48)
MAGNESIUM SERPL-MCNC: 2.1 MG/DL (ref 1.6–2.6)
MCH RBC QN AUTO: 27.7 PG (ref 27–31)
MCHC RBC AUTO-ENTMCNC: 29.2 G/DL (ref 32–36)
MCV RBC AUTO: 95 FL (ref 82–98)
MODE: ABNORMAL
MONOCYTES # BLD AUTO: 0.8 K/UL (ref 0.3–1)
MONOCYTES NFR BLD: 7.2 % (ref 4–15)
NEUTROPHILS # BLD AUTO: 7.9 K/UL (ref 1.8–7.7)
NEUTROPHILS NFR BLD: 72.6 % (ref 38–73)
NRBC BLD-RTO: 0 /100 WBC
PCO2 BLDA: 77.2 MMHG (ref 35–45)
PH SMN: 7.29 [PH] (ref 7.35–7.45)
PHOSPHATE SERPL-MCNC: 3 MG/DL (ref 2.7–4.5)
PLATELET # BLD AUTO: 171 K/UL (ref 150–450)
PMV BLD AUTO: 12.8 FL (ref 9.2–12.9)
PO2 BLDA: 23 MMHG (ref 40–60)
POC BE: 10 MMOL/L
POC SATURATED O2: 31 % (ref 95–100)
POCT GLUCOSE: 104 MG/DL (ref 70–110)
POCT GLUCOSE: 193 MG/DL (ref 70–110)
POCT GLUCOSE: 81 MG/DL (ref 70–110)
POCT GLUCOSE: 92 MG/DL (ref 70–110)
POTASSIUM SERPL-SCNC: 3.9 MMOL/L (ref 3.5–5.1)
PROT SERPL-MCNC: 4.9 G/DL (ref 6–8.4)
RBC # BLD AUTO: 2.85 M/UL (ref 4–5.4)
SAMPLE: ABNORMAL
SITE: ABNORMAL
SODIUM SERPL-SCNC: 142 MMOL/L (ref 136–145)
WBC # BLD AUTO: 10.89 K/UL (ref 3.9–12.7)

## 2023-08-31 PROCEDURE — 94640 AIRWAY INHALATION TREATMENT: CPT

## 2023-08-31 PROCEDURE — 94660 CPAP INITIATION&MGMT: CPT

## 2023-08-31 PROCEDURE — 99900035 HC TECH TIME PER 15 MIN (STAT)

## 2023-08-31 PROCEDURE — 84100 ASSAY OF PHOSPHORUS: CPT

## 2023-08-31 PROCEDURE — 25000242 PHARM REV CODE 250 ALT 637 W/ HCPCS

## 2023-08-31 PROCEDURE — 83880 ASSAY OF NATRIURETIC PEPTIDE: CPT | Performed by: INTERNAL MEDICINE

## 2023-08-31 PROCEDURE — 25000003 PHARM REV CODE 250: Performed by: STUDENT IN AN ORGANIZED HEALTH CARE EDUCATION/TRAINING PROGRAM

## 2023-08-31 PROCEDURE — 36415 COLL VENOUS BLD VENIPUNCTURE: CPT

## 2023-08-31 PROCEDURE — 27000190 HC CPAP FULL FACE MASK W/VALVE

## 2023-08-31 PROCEDURE — 99233 PR SUBSEQUENT HOSPITAL CARE,LEVL III: ICD-10-PCS | Mod: ,,, | Performed by: INTERNAL MEDICINE

## 2023-08-31 PROCEDURE — 93010 ELECTROCARDIOGRAM REPORT: CPT | Mod: ,,, | Performed by: INTERNAL MEDICINE

## 2023-08-31 PROCEDURE — 20600001 HC STEP DOWN PRIVATE ROOM

## 2023-08-31 PROCEDURE — 97162 PT EVAL MOD COMPLEX 30 MIN: CPT

## 2023-08-31 PROCEDURE — 63600175 PHARM REV CODE 636 W HCPCS

## 2023-08-31 PROCEDURE — 99233 SBSQ HOSP IP/OBS HIGH 50: CPT | Mod: ,,, | Performed by: INTERNAL MEDICINE

## 2023-08-31 PROCEDURE — 83735 ASSAY OF MAGNESIUM: CPT

## 2023-08-31 PROCEDURE — 93010 EKG 12-LEAD: ICD-10-PCS | Mod: ,,, | Performed by: INTERNAL MEDICINE

## 2023-08-31 PROCEDURE — 97530 THERAPEUTIC ACTIVITIES: CPT

## 2023-08-31 PROCEDURE — 94761 N-INVAS EAR/PLS OXIMETRY MLT: CPT

## 2023-08-31 PROCEDURE — 93005 ELECTROCARDIOGRAM TRACING: CPT

## 2023-08-31 PROCEDURE — 36415 COLL VENOUS BLD VENIPUNCTURE: CPT | Performed by: INTERNAL MEDICINE

## 2023-08-31 PROCEDURE — 80053 COMPREHEN METABOLIC PANEL: CPT

## 2023-08-31 PROCEDURE — 85025 COMPLETE CBC W/AUTO DIFF WBC: CPT

## 2023-08-31 PROCEDURE — 25000003 PHARM REV CODE 250

## 2023-08-31 PROCEDURE — 27000221 HC OXYGEN, UP TO 24 HOURS

## 2023-08-31 RX ADMIN — IPRATROPIUM BROMIDE AND ALBUTEROL SULFATE 3 ML: .5; 3 SOLUTION RESPIRATORY (INHALATION) at 09:08

## 2023-08-31 RX ADMIN — AMLODIPINE BESYLATE 10 MG: 10 TABLET ORAL at 08:08

## 2023-08-31 RX ADMIN — FUROSEMIDE 20 MG: 20 TABLET ORAL at 08:08

## 2023-08-31 RX ADMIN — IPRATROPIUM BROMIDE AND ALBUTEROL SULFATE 3 ML: .5; 3 SOLUTION RESPIRATORY (INHALATION) at 05:08

## 2023-08-31 RX ADMIN — METOPROLOL TARTRATE 75 MG: 50 TABLET, FILM COATED ORAL at 03:08

## 2023-08-31 RX ADMIN — METOPROLOL TARTRATE 75 MG: 50 TABLET, FILM COATED ORAL at 08:08

## 2023-08-31 RX ADMIN — IPRATROPIUM BROMIDE AND ALBUTEROL SULFATE 3 ML: .5; 3 SOLUTION RESPIRATORY (INHALATION) at 12:08

## 2023-08-31 RX ADMIN — ENOXAPARIN SODIUM 40 MG: 40 INJECTION SUBCUTANEOUS at 04:08

## 2023-08-31 RX ADMIN — ASPIRIN 81 MG 81 MG: 81 TABLET ORAL at 08:08

## 2023-08-31 RX ADMIN — IPRATROPIUM BROMIDE AND ALBUTEROL SULFATE 3 ML: .5; 3 SOLUTION RESPIRATORY (INHALATION) at 10:08

## 2023-08-31 RX ADMIN — TRAZODONE HYDROCHLORIDE 25 MG: 50 TABLET ORAL at 08:08

## 2023-08-31 RX ADMIN — CLOPIDOGREL BISULFATE 75 MG: 75 TABLET ORAL at 08:08

## 2023-08-31 NOTE — PLAN OF CARE
08/31/23 1436   Post-Acute Status   Post-Acute Authorization Placement   Post-Acute Placement Status Pending post-acute provider review/more information requested     RSW confirmed with PT Myrna after she met with pt that recs are for SNF, not rehab, due to pt not being able to tolerate three hours of therapy.      Referral under review with [facility].  [facility] but was told that they are at capacity and do not know when a bed will become available.      UPDATE 3:29 PM  Per Lauren with [facility] they will not have a bed available until this weekend.  Additional referrals sent to the following facilities:  [facility]        Ronda Esquivel LMSW  [facility]  [id number]

## 2023-08-31 NOTE — NURSING
"Patient noted to have been complaining of sob w and w/o exertion today, PRN breathing treatments have been utilized. Spoke to CCU resident about these finding who stated to call him back if the breathing treatment does not work.     Post breathing treatment and the patient is still complaining of sob and now adding in that she feels like there is a "weight" on her chest. Attempted to call CCU resident back, twice, with no answer or call  back. Will cont to [facility] for now.    Mik with CCU called back, ordered VBG and EKG, both completed, all vitals are WDL, will cont to [facility].       Latest Reference Range & Units 08/31/23 18:16   POC PH 7.35 - 7.45  7.289 (L)   POC PCO2 35 - 45 mmHg 77.2 (H)   POC PO2 40 - 60 mmHg 23 (L)   POC SATURATED O2 95 - 100 % 31 (L)   Sample  VENOUS   POC Hematocrit 36 - 54 %PCV 27 (L)   POC BE -2 to 2 mmol/L 10   DelSys  Nasal Can   Site  Other   Mode  SPONT   (L): Data is abnormally low  (H): Data is abnormally high    Mik with CCU looked over VBG and stated to just cont to sharon for now and keep her SAT goal for copd to 90-92%, will cont to [facility].   "

## 2023-08-31 NOTE — PT/OT/SLP EVAL
Physical Therapy Evaluation and Treatment    Patient Name:  Marisol Kerr   MRN:  [id number]  Admit Date: 8/29/2023  Admitting Diagnosis:  Acute on chronic respiratory failure   Length of Stay: 2 days  Recent Surgery: Procedure(s) (LRB):  Stent, Drug Eluting, Single Vessel, Coronary (N/A)  Instantaneous Wave-Free Ratio (IFR) (N/A)  ANGIOGRAM, CORONARY ARTERY (N/A) 2 Days Post-Op    Recommendations:     Discharge Recommendations:  nursing facility, skilled   Discharge Equipment Recommendations: none   Barriers to discharge:  Increased need for caregiver assistance    Plan:     During this hospitalization, patient to be seen 3 x/week to address the identified rehab impairments via therapeutic activities, gait training, therapeutic exercises, neuromuscular re-education and progress towards the established goals.  Plan of Care Expires:  09/30/23  Plan of Care Reviewed with: patient    Assessment:     Marisol Kerr is a 78 y.o. female admitted with a medical diagnosis of Acute on chronic respiratory failure. Patient presents POD2 coronary artery angiogram and stent placement. Patient agreeable to sesion and found supine in bed. Patient reports living alone and mod (I) PTA. Patient reports use of home oxygen 4L NC at all times PTA. Patient with decreased strength and endurance and impaired balance, resulting in increased need for assistance at this time. Patient able to ambulate 14' in room with CGA but demonstrated increased WOB and wheezing after trial, demonstrating patient's decreased activity tolerance. Patient is not functioning at her baseline and is a fall risk at this time. Patient has good potential to make further gains towards independence with decreased pain levels and increased mobility. Patient would benefit from skilled therapy services to maximize safety and independence, increase activity tolerance, decrease fall risk, decrease caregiver burden, improve QOL, improve patient's functional mobility, and decrease  "risk of contractures and pressure sores. Patient would benefit from continued therapy at a skilled nursing facility to aid in her recovery.       Problem List: weakness, impaired endurance, impaired self care skills, impaired functional mobility, gait instability, impaired balance, decreased coordination, impaired cardiopulmonary response to activity.  Rehab Prognosis: Good; patient would benefit from acute skilled PT services to address these deficits and reach maximum level of function.      Goals:   Multidisciplinary Problems       Physical Therapy Goals          Problem: Physical Therapy    Goal Priority Disciplines Outcome Goal Variances Interventions   Physical Therapy Goal     PT, PT/OT Ongoing, Progressing     Description: Goals to be met by: 23     Patient will increase functional independence with mobility by performin. Supine to sit with Supervision  2. Sit to stand transfer with Supervision  3. Bed to chair transfer with Supervision using LRAD  4. Gait  x 75 feet with Supervision using LRAD.   5. Ascend/descend 1 stair with right Handrails Contact Guard Assistance using LRAD  6. Lower extremity exercise program x30 reps per handout, with independence                         Subjective     RN notified prior to session. No one present upon PT entrance into room. Patient agreeable to PT evaluation.    Chief Complaint: "I want to go to rehab to get stronger"  Patient/Family Comments/goals: increase endurance and strength  Pain/Comfort:  Pain Rating 1: 0/10  Pain Rating Post-Intervention 1: 0/10    Social History:  Residence: lives alone 1-story house with 1 NAYE. Pt's bathroom has a walk in shower with a threshold step to enter.  Support available:  daughter (daughter has two infants and cannot help /)  Equipment Owned (not using): bedside commode, bath bench  Equipment Used: oxygen, straight cane, transport chair, rollator   Prior level of function: modified independent with mobility and " "ADLs      Patient reports they will have assistance from daughter (no 24/7 assist) upon discharge.    Objective:       Patient found supine with: PureWick, oxygen, peripheral IV     General Precautions: Standard, Cardiac fall   Orthopedic Precautions:N/A   Braces: N/A   Body mass index is 31.32 kg/m².  Oxygen Device: Nasal Cannula 4L  Vitals: BP (!) 144/79 (BP Location: Right arm, Patient Position: Sitting)   Pulse 75   Temp 97.8 °F (36.6 °C) (Tympanic)   Resp 17   Ht 5' 3" (1.6 m)   Wt 80.2 kg (176 lb 12.9 oz)   SpO2 98%   BMI 31.32 kg/m²       Exams:    Cognition:  Alert and Cooperative  Command following: Follows multistep verbal commands  Communication: clear/fluent    Sensation:   Light touch sensation: Intact BLEs    Gross Motor Coordination: No deficits noted during functional mobility tasks     Edema/Skin Integrity: None noted; Visible skin intact    Postural examination/scapula alignment: Rounded shoulder and Head forward    Lower Extremity Range of Motion:  Right Lower Extremity: WFL  Left Lower Extremity: WFL    Lower Extremity Strength:  Right Lower Extremity: WFL  Left Lower Extremity: WFL      Functional Mobility:    Bed Mobility:  Rolling to left with contact guard assistance  Scooting with contact guard assistance  Supine > Sit with contact guard assistance    Transfers:   Sit <> Stand Transfer: Contact Guard Assistance x 2 trials from EOB with RW              Gait:  Distance: 14' in room  Assistance level: Contact Guard Assistance  Assistive Device: rolling walker  Gait Assessment: decreased step length , decreased stride length , decreased esther, decreased gait speed, and decreased heel strike   Comment: Pt with no LOB during trial. Patient with increased WOB and wheezing during and after trial.     Balance:  Dynamic Sitting: GOOD+: Maintains balance through MAXIMAL excursions of active trunk motion  Pt with increased WOB after sitting EOB ~8 minutes. Patient O2 sats taken with pulse ox " and were reading 65%. RN notified and entered room- RN placed pulse ox on ear and patient satting 94-95%.   Standing:  Static: FAIR: Maintains without assist but unable to take challenges   Dynamic: FAIR: Needs CONTACT GUARD during gait    Outcome Measures:  AM-PAC 6 CLICK MOBILITY  Turning over in bed (including adjusting bedclothes, sheets and blankets)?: 3  Sitting down on and standing up from a chair with arms (e.g., wheelchair, bedside commode, etc.): 3  Moving from lying on back to sitting on the side of the bed?: 3  Moving to and from a bed to a chair (including a wheelchair)?: 3  Need to walk in hospital room?: 3  Climbing 3-5 steps with a railing?: 2  Basic Mobility Total Score: 17     Education:  Time provided for education, counseling and discussion of health disposition in regards to patient's current status  All questions answered within PT scope of practice and to patient's satisfaction  PT role in POC to address current functional deficits  Pt educated on proper body mechanics, safety techniques, and energy conservation with PT facilitation and cueing throughout session  Call nursing/pct to transfer to chair/use bathroom. Pt stated understanding.  Importance of OOB tolerance to improve lung ventilation and expansion as well as strengthen postural musculature  Pt educated on pursed lip breathing techniques.     Patient left up in chair with all lines intact, call button in reach, and RN notified.      History:     Past Medical History:   Diagnosis Date    CAD (coronary artery disease)     Cancer     colon    CHF (congestive heart failure)     Colitis     Colon cancer     COPD (chronic obstructive pulmonary disease)     Decreased hearing     Diabetes mellitus     Diabetes mellitus, type 2     Hypercholesterolemia     Hypertension     Hypoxemia     Insomnia     Obesity     Osteoarthritis        Past Surgical History:   Procedure Laterality Date    ANGIOGRAM, CORONARY, WITH LEFT HEART CATHETERIZATION N/A  2/10/2022    Procedure: Angiogram, Coronary, with Left Heart Cath;  Surgeon: Cristian Benjamin MD;  Location: [facility] CATH/EP LAB;  Service: Cardiology;  Laterality: N/A;    CARDIAC CATHETERIZATION      CHOLECYSTECTOMY      COLECTOMY      CORONARY ANGIOGRAPHY N/A 2023    Procedure: ANGIOGRAM, CORONARY ARTERY;  Surgeon: Nba Riggs MD;  Location: [facility] CATH LAB;  Service: Cardiology;  Laterality: N/A;    CORONARY ANGIOPLASTY      CORONARY STENT PLACEMENT      ERCP N/A 2023    Procedure: ERCP (ENDOSCOPIC RETROGRADE CHOLANGIOPANCREATOGRAPHY);  Surgeon: Biju Kramer III, MD;  Location: [facility] ENDO;  Service: Endoscopy;  Laterality: N/A;    ESOPHAGOGASTRODUODENOSCOPY N/A 2023    Procedure: EGD (ESOPHAGOGASTRODUODENOSCOPY);  Surgeon: Aarti Alvarez MD;  Location: [facility] ENDO;  Service: Endoscopy;  Laterality: N/A;    EXTERNAL EAR SURGERY      EYE SURGERY      FLEXIBLE SIGMOIDOSCOPY N/A 2019    Procedure: SIGMOIDOSCOPY, FLEXIBLE;  Surgeon: Biju Kramer III, MD;  Location: [facility] ENDO;  Service: Endoscopy;  Laterality: N/A;    HYSTERECTOMY      partial    INSTANTANEOUS WAVE-FREE RATIO (IFR) N/A 2023    Procedure: Instantaneous Wave-Free Ratio (IFR);  Surgeon: Nba Riggs MD;  Location: [facility] CATH LAB;  Service: Cardiology;  Laterality: N/A;    STENT, DRUG ELUTING, SINGLE VESSEL, CORONARY N/A 2023    Procedure: Stent, Drug Eluting, Single Vessel, Coronary;  Surgeon: Nba Riggs MD;  Location: [facility] CATH LAB;  Service: Cardiology;  Laterality: N/A;       Family History   Problem Relation Age of Onset    Febrile seizures Mother     Heart failure Father        Social History     Socioeconomic History    Marital status:    Tobacco Use    Smoking status: Former     Current packs/day: 0.00     Average packs/day: 3.0 packs/day for 50.0 years (150.1 ttl pk-yrs)     Types: Cigarettes     Start date: 3/30/1964     Quit date: 2006     Years since quittin.5     Smokeless tobacco: Never    Tobacco comments:     ex smoker 15 years   Substance and Sexual Activity    Alcohol use: Yes    Drug use: No    Sexual activity: Never     Social Determinants of Health     Financial Resource Strain: Unknown (7/28/2023)    Overall Financial Resource Strain (CARDIA)     Difficulty of Paying Living Expenses: Patient refused   Food Insecurity: No Food Insecurity (7/28/2023)    Hunger Vital Sign     Worried About Running Out of Food in the Last Year: Never true     Ran Out of Food in the Last Year: Never true   Transportation Needs: No Transportation Needs (7/28/2023)    PRAPARE - Transportation     Lack of Transportation (Medical): No     Lack of Transportation (Non-Medical): No   Physical Activity: Unknown (7/28/2023)    Exercise Vital Sign     Days of Exercise per Week: Patient refused     Minutes of Exercise per Session: Patient refused   Recent Concern: Physical Activity - Inactive (7/23/2023)    Exercise Vital Sign     Days of Exercise per Week: 0 days     Minutes of Exercise per Session: 0 min   Stress: Unknown (7/28/2023)    [employer] - Occupational Stress Questionnaire     Feeling of Stress : Patient refused   Recent Concern: Stress - Stress Concern Present (7/23/2023)    [employer] - Occupational Stress Questionnaire     Feeling of Stress : Rather much   Social Connections: Unknown (7/28/2023)    Social Connection and Isolation Panel [NHANES]     Frequency of Communication with Friends and Family: Patient refused     Frequency of Social Gatherings with Friends and Family: Patient refused     Attends Restorationist Services: Patient refused     Active Member of Clubs or Organizations: Patient refused     Attends Club or Organization Meetings: Patient refused     Marital Status:    Recent Concern: Social Connections - Socially Isolated (7/23/2023)    Social Connection and Isolation Panel [NHANES]     Frequency of  Communication with Friends and Family: More than three times a week     Frequency of Social Gatherings with Friends and Family: More than three times a week     Attends Episcopalian Services: Never     Active Member of Clubs or Organizations: No     Attends Club or Organization Meetings: Never     Marital Status:    Housing Stability: Low Risk  (7/28/2023)    Housing Stability Vital Sign     Unable to Pay for Housing in the Last Year: No     Number of Places Lived in the Last Year: 1     Unstable Housing in the Last Year: No       Time Tracking:     PT Received On: 08/31/23  PT Start Time: 1117     PT Stop Time: 1203  PT Total Time (min): 46 min     Billable Minutes: Evaluation 10 minutes, Therapeutic Activity 20 minutes, and Therapeutic Exercise 10 minutes    8/31/2023

## 2023-08-31 NOTE — SUBJECTIVE & OBJECTIVE
Interval History: NAEON. VSS. Pending SNF placement    Review of Systems   Constitutional: Negative for chills, diaphoresis, fever, malaise/fatigue, weight gain and weight loss.   HENT:  Negative for ear pain, nosebleeds and odynophagia.    Eyes:  Negative for blurred vision, double vision, vision loss in left eye, vision loss in right eye and visual disturbance.   Cardiovascular:  Negative for chest pain, dyspnea on exertion, leg swelling, near-syncope, palpitations and syncope.   Respiratory:  Negative for shortness of breath and wheezing.    Hematologic/Lymphatic: Negative for bleeding problem. Does not bruise/bleed easily.   Skin:  Negative for poor wound healing and rash.   Musculoskeletal:  Negative for back pain, joint pain and neck pain.   Gastrointestinal:  Negative for abdominal pain, change in bowel habit, constipation, diarrhea, hematemesis, hematochezia, melena, nausea and vomiting.   Genitourinary:  Negative for dysuria, flank pain, hematuria and pelvic pain.   Neurological:  Negative for dizziness, focal weakness, headaches, light-headedness, seizures and weakness.   Psychiatric/Behavioral:  Negative for memory loss. The patient is nervous/anxious. The patient does not have insomnia.      Objective:     Vital Signs (Most Recent):  Temp: 97.5 °F (36.4 °C) (08/31/23 0703)  Pulse: (!) 57 (08/31/23 0703)  Resp: 20 (08/31/23 0703)  BP: (!) 127/58 (08/31/23 0703)  SpO2: 99 % (08/31/23 0703) Vital Signs (24h Range):  Temp:  [97.4 °F (36.3 °C)-98.3 °F (36.8 °C)] 97.5 °F (36.4 °C)  Pulse:  [53-83] 57  Resp:  [18-24] 20  SpO2:  [90 %-99 %] 99 %  BP: (112-167)/(58-76) 127/58     Weight: 80.2 kg (176 lb 12.9 oz)  Body mass index is 31.32 kg/m².     SpO2: 99 %         Intake/Output Summary (Last 24 hours) at 8/31/2023 0940  Last data filed at 8/31/2023 0520  Gross per 24 hour   Intake 222 ml   Output 950 ml   Net -728 ml       Lines/Drains/Airways       Drain  Duration             Female External Urinary Catheter  08/29/23 1703 1 day              Peripheral Intravenous Line  Duration                  Peripheral IV - Single Lumen 08/27/23 2145 20 G Anterior;Left Forearm 3 days                       Physical Exam  Vitals and nursing note reviewed.   Constitutional:       General: She is not in acute distress.     Appearance: Normal appearance. She is obese. She is not ill-appearing or diaphoretic.   HENT:      Head: Normocephalic and atraumatic.      Nose: Nose normal.      Mouth/Throat:      Mouth: Mucous membranes are moist.      Pharynx: Oropharynx is clear.   Eyes:      Extraocular Movements: Extraocular movements intact.   Cardiovascular:      Rate and Rhythm: Normal rate and regular rhythm.      Pulses: Normal pulses.      Heart sounds: Murmur heard.   Pulmonary:      Effort: Pulmonary effort is normal.      Breath sounds: Normal breath sounds. No wheezing or rales.   Abdominal:      General: Bowel sounds are normal.      Palpations: Abdomen is soft.      Tenderness: There is no abdominal tenderness.      Hernia: A hernia (R hernia abdominal hernia, reducable, non-painful) is present.   Musculoskeletal:         General: Normal range of motion.      Cervical back: Normal range of motion and neck supple.      Right lower leg: No edema.      Left lower leg: No edema.   Skin:     General: Skin is warm and dry.   Neurological:      General: No focal deficit present.      Mental Status: She is alert and oriented to person, place, and time.   Psychiatric:         Mood and Affect: Mood normal.         Behavior: Behavior normal.            Significant Labs: All pertinent lab results from the last 24 hours have been reviewed.    Significant Imaging:  all imaging reviewed

## 2023-08-31 NOTE — PLAN OF CARE
08/31/23 1154   Post-Acute Status   Post-Acute Authorization Placement   Post-Acute Placement Status Referrals Sent     SW met with pt at bedside to review discharge recommendation of SNF and is agreeable to plan.  SW began to speak with pt about choices but pt requested that SW ask her daughter Marisol.  SW called Marisol who reported that pt was previously at [facility] and requested that pt go back there.  SW explained that SNF and rehab are a different level of care and that pt is unlikely to meet criteria for SNF but SW stated that she would ask.  In the meantime, ADRIANA provided list of skilled nursing facilities and discussed with Marisol sending referrals to the following facilities:   [facility]    Marisol instructed to identify preference:    Preferred Facility: (if more than 1, listed in order of descending preference)  [facility]    If an additional preferred facility not listed above is identified, additional referral to be sent. If above facilities unable to accept, will send additional referrals to in-network providers.     SW relayed Marisol's request to both pt and PT Myrna to assess for rehab.  ADRIANA will continue to follow.      Ronda Esquivel, MARIE  [facility]  [id number]

## 2023-08-31 NOTE — PLAN OF CARE
PT evaluation completed- see note for details. PT POC and goals established.    Problem: Physical Therapy  Goal: Physical Therapy Goal  Description: Goals to be met by: 23     Patient will increase functional independence with mobility by performin. Supine to sit with Supervision  2. Sit to stand transfer with Supervision  3. Bed to chair transfer with Supervision using LRAD  4. Gait  x 75 feet with Supervision using LRAD.   5. Ascend/descend 1 stair with right Handrails Contact Guard Assistance using LRAD  6. Lower extremity exercise program x30 reps per handout, with independence    Outcome: Ongoing, Progressing

## 2023-08-31 NOTE — PROGRESS NOTES
[facility]  Cardiology  Progress Note    Patient Name: Marisol Kerr  MRN: [id number]  Admission Date: 8/29/2023  Hospital Length of Stay: 2 days  Code Status: Full Code   Attending Physician: Nba Riggs MD   Primary Care Physician: Khadar Dwyer MD  Expected Discharge Date: 8/31/2023  Principal Problem:Acute on chronic respiratory failure    Subjective:     Hospital Course:   Patient admitted to CCU for further management. Patient underwent LHC which showed severe stenosis of Lcx with iFR 0.74 and  of the mRCA. Patient had BANDAR to Lcx. PT/OT pending evaluation for discharge      Interval History: NAEON. VSS. Pending SNF placement    Review of Systems   Constitutional: Negative for chills, diaphoresis, fever, malaise/fatigue, weight gain and weight loss.   HENT:  Negative for ear pain, nosebleeds and odynophagia.    Eyes:  Negative for blurred vision, double vision, vision loss in left eye, vision loss in right eye and visual disturbance.   Cardiovascular:  Negative for chest pain, dyspnea on exertion, leg swelling, near-syncope, palpitations and syncope.   Respiratory:  Negative for shortness of breath and wheezing.    Hematologic/Lymphatic: Negative for bleeding problem. Does not bruise/bleed easily.   Skin:  Negative for poor wound healing and rash.   Musculoskeletal:  Negative for back pain, joint pain and neck pain.   Gastrointestinal:  Negative for abdominal pain, change in bowel habit, constipation, diarrhea, hematemesis, hematochezia, melena, nausea and vomiting.   Genitourinary:  Negative for dysuria, flank pain, hematuria and pelvic pain.   Neurological:  Negative for dizziness, focal weakness, headaches, light-headedness, seizures and weakness.   Psychiatric/Behavioral:  Negative for memory loss. The patient is nervous/anxious. The patient does not have insomnia.      Objective:     Vital Signs (Most Recent):  Temp: 97.5 °F (36.4 °C) (08/31/23 0703)  Pulse: (!) 57 (08/31/23  0703)  Resp: 20 (08/31/23 0703)  BP: (!) 127/58 (08/31/23 0703)  SpO2: 99 % (08/31/23 0703) Vital Signs (24h Range):  Temp:  [97.4 °F (36.3 °C)-98.3 °F (36.8 °C)] 97.5 °F (36.4 °C)  Pulse:  [53-83] 57  Resp:  [18-24] 20  SpO2:  [90 %-99 %] 99 %  BP: (112-167)/(58-76) 127/58     Weight: 80.2 kg (176 lb 12.9 oz)  Body mass index is 31.32 kg/m².     SpO2: 99 %         Intake/Output Summary (Last 24 hours) at 8/31/2023 0923  Last data filed at 8/31/2023 0520  Gross per 24 hour   Intake 222 ml   Output 950 ml   Net -728 ml       Lines/Drains/Airways       Drain  Duration             Female External Urinary Catheter 08/29/23 1703 1 day              Peripheral Intravenous Line  Duration                  Peripheral IV - Single Lumen 08/27/23 2145 20 G Anterior;Left Forearm 3 days                       Physical Exam  Vitals and nursing note reviewed.   Constitutional:       General: She is not in acute distress.     Appearance: Normal appearance. She is obese. She is not ill-appearing or diaphoretic.   HENT:      Head: Normocephalic and atraumatic.      Nose: Nose normal.      Mouth/Throat:      Mouth: Mucous membranes are moist.      Pharynx: Oropharynx is clear.   Eyes:      Extraocular Movements: Extraocular movements intact.   Cardiovascular:      Rate and Rhythm: Normal rate and regular rhythm.      Pulses: Normal pulses.      Heart sounds: Murmur heard.   Pulmonary:      Effort: Pulmonary effort is normal.      Breath sounds: Normal breath sounds. No wheezing or rales.   Abdominal:      General: Bowel sounds are normal.      Palpations: Abdomen is soft.      Tenderness: There is no abdominal tenderness.      Hernia: A hernia (R hernia abdominal hernia, reducable, non-painful) is present.   Musculoskeletal:         General: Normal range of motion.      Cervical back: Normal range of motion and neck supple.      Right lower leg: No edema.      Left lower leg: No edema.   Skin:     General: Skin is warm and dry.    Neurological:      General: No focal deficit present.      Mental Status: She is alert and oriented to person, place, and time.   Psychiatric:         Mood and Affect: Mood normal.         Behavior: Behavior normal.            Significant Labs: All pertinent lab results from the last 24 hours have been reviewed.    Significant Imaging:  all imaging reviewed    Assessment and Plan:       * Acute on chronic respiratory failure  Patient with Hypoxic Respiratory failure which is Chronic.  she is on home oxygen at 4 LPM. Supplemental oxygen was provided and noted-      .   Signs/symptoms of respiratory failure include- tachypnea. Contributing diagnoses includes - CHF and COPD Labs and images were reviewed. Patient Has not had a recent ABG. Will treat underlying causes and adjust management of respiratory failure as follows- continue home O2    Chronic diastolic congestive heart failure  Patient is identified as having Diastolic (HFpEF) heart failure that is Chronic. CHF is currently controlled. Latest ECHO performed and demonstrates- Results for orders placed during the hospital encounter of 08/25/23    Echo Saline Bubble? No    Interpretation Summary    Left Ventricle: The left ventricle is normal in size. Moderately increased ventricular mass. Moderately increased wall thickness. There is moderate concentrict hypertrophy. Normal wall motion. There is normal systolic function.  EF 61% There is normal diastolic function.    Left Atrium: Left atrium is moderately dilated.    Right Ventricle: Normal right ventricular cavity size. Wall thickness is normal. Right ventricle wall motion  is normal. Systolic function is normal.    Mitral Valve: Mildly thickened anterior leaflet. Mild mitral annular calcification.    IVC/SVC: Normal venous pressure at 3 mmHg.    Pericardium: There is an effusion.    Limited echo; no gross pulmonary hypertension but poor visualization of tricuspid signal    No tissue Doppler performed  to assess mean left atrial pressure  . Continue Beta Blocker, Furosemide and Nitrate/Vasodilator and monitor clinical status closely. Monitor on telemetry. Patient is off CHF pathway.  Monitor strict Is&Os and daily weights.  Place on fluid restriction of 1.5 L. Continue to stress to patient importance of self efficacy and  on diet for CHF. Last BNP reviewed- and noted below   Recent Labs   Lab 08/29/23  0855   *   .    Chronic kidney disease, stage 4 (severe)  Patient with chronic CKD baseline Cr 1.5-1.7.     - avoid nephrotoxic agents or renally dose medications  - trend Cr    Gastroesophageal reflux disease without esophagitis  Continue ppi    DM type 2, controlled, with complication  Last A1c on 8/25 showed 5.0. BG slightly elevated.     - LDSSI  - POCT glucose    Essential hypertension, benign  continue amlodipine    Hypercholesterolemia  Statin held at OSH    - will resume when clinically indicated    Debility  Patient mostly bedbound. Had rehab in the past. Will have PT/OT eval for discharge needs    Unstable angina  78 yoF admitted for ongoing chest pain. Noted to have elevated troponins at OSH but here had troponin of 0.013. patient has some chest pain but only when anxious. Patient has severe multivessel disease. Patient transferred for LHC. Patient underwent LHC which showed severe stenosis of Lcx with iFR 0.74 and  of the mRCA. Patient had BANDAR to Lcx.      - continue DAPT        VTE Risk Mitigation (From admission, onward)         Ordered     enoxaparin injection 40 mg  Every 24 hours         08/30/23 0922     IP VTE HIGH RISK PATIENT  Once         08/29/23 0730     Place sequential compression device  Until discontinued         08/29/23 0730                Becca Jacobson MD  Cardiology  [facility]

## 2023-08-31 NOTE — PLAN OF CARE
"Attempted to call [employer] ([phone]) at this time and was informed per  that all agents are busy and they will have to call me back. She took contact information . Awaiting return call.     3:23 Returned call to call  in the [facility] once done ,faxed [facility] as per protocol . Gave packet to ADRIANA Bond.     3:52 pm 8/31/2023 3:20:10 PM Transmission Record          Sent to [phone] with remote ID "          Result: (0/301;0/0) Normal Busy          Page record: NONE SENT          Elapsed time: 00:05 on channel 60    8/31/2023 3:26:27 PM Transmission Record          Sent to [phone] with remote ID "          Result: (0/301;0/0) Normal Busy          Page record: NONE SENT          Elapsed time: 00:04 on channel 66    8/31/2023 3:32:51 PM Transmission Record          Sent to [phone] with remote ID "          Result: (4/3;0/0) Line broken (no loop current)          Page record: NONE SENT          Elapsed time: 00:03 on channel 54    8/31/2023 3:38:56 PM Transmission Record          Sent to [phone] with remote ID "          Result: (0/301;0/0) Normal Busy          Page record: NONE SENT          Elapsed time: 00:05 on channel 45    8/31/2023 3:44:40 PM Transmission Record          Sent to [phone] with remote ID "          Result: (0/301;0/0) Normal Busy          Page record: NONE SENT          Elapsed time: 00:04 on channel 9    8/31/2023 3:52:01 PM Transmission Record          Sent to [phone] with remote ID "Fax "          Result: (0/339;0/0) Success          Page record: 1 - 6          Elapsed time: 03:26 on channel 56      Yani Swenson RN    [phone]    "

## 2023-09-01 PROBLEM — J96.10 CHRONIC RESPIRATORY FAILURE: Status: ACTIVE | Noted: 2023-09-01

## 2023-09-01 PROBLEM — J96.10 CHRONIC RESPIRATORY FAILURE: Status: RESOLVED | Noted: 2023-09-01 | Resolved: 2023-09-01

## 2023-09-01 LAB
ALBUMIN SERPL BCP-MCNC: 2.3 G/DL (ref 3.5–5.2)
ALLENS TEST: ABNORMAL
ALP SERPL-CCNC: 68 U/L (ref 55–135)
ALT SERPL W/O P-5'-P-CCNC: <5 U/L (ref 10–44)
ANION GAP SERPL CALC-SCNC: 8 MMOL/L (ref 8–16)
ANISOCYTOSIS BLD QL SMEAR: SLIGHT
AST SERPL-CCNC: 12 U/L (ref 10–40)
BASOPHILS # BLD AUTO: 0.02 K/UL (ref 0–0.2)
BASOPHILS NFR BLD: 0.2 % (ref 0–1.9)
BILIRUB SERPL-MCNC: 0.2 MG/DL (ref 0.1–1)
BNP SERPL-MCNC: 527 PG/ML (ref 0–99)
BUN SERPL-MCNC: 56 MG/DL (ref 8–23)
CALCIUM SERPL-MCNC: 8.5 MG/DL (ref 8.7–10.5)
CHLORIDE SERPL-SCNC: 106 MMOL/L (ref 95–110)
CO2 SERPL-SCNC: 31 MMOL/L (ref 23–29)
CREAT SERPL-MCNC: 1.3 MG/DL (ref 0.5–1.4)
DELSYS: ABNORMAL
DIFFERENTIAL METHOD: ABNORMAL
EOSINOPHIL # BLD AUTO: 0.1 K/UL (ref 0–0.5)
EOSINOPHIL NFR BLD: 1.5 % (ref 0–8)
ERYTHROCYTE [DISTWIDTH] IN BLOOD BY AUTOMATED COUNT: 14.8 % (ref 11.5–14.5)
EST. GFR  (NO RACE VARIABLE): 42.6 ML/MIN/1.73 M^2
GLUCOSE SERPL-MCNC: 84 MG/DL (ref 70–110)
HCT VFR BLD AUTO: 28.4 % (ref 37–48.5)
HCT VFR BLD CALC: 35 %PCV (ref 36–54)
HGB BLD-MCNC: 8.5 G/DL (ref 12–16)
HYPOCHROMIA BLD QL SMEAR: ABNORMAL
IMM GRANULOCYTES # BLD AUTO: 0.04 K/UL (ref 0–0.04)
IMM GRANULOCYTES NFR BLD AUTO: 0.4 % (ref 0–0.5)
LYMPHOCYTES # BLD AUTO: 1.9 K/UL (ref 1–4.8)
LYMPHOCYTES NFR BLD: 19.9 % (ref 18–48)
MAGNESIUM SERPL-MCNC: 2 MG/DL (ref 1.6–2.6)
MCH RBC QN AUTO: 28.3 PG (ref 27–31)
MCHC RBC AUTO-ENTMCNC: 29.9 G/DL (ref 32–36)
MCV RBC AUTO: 95 FL (ref 82–98)
MONOCYTES # BLD AUTO: 0.7 K/UL (ref 0.3–1)
MONOCYTES NFR BLD: 7 % (ref 4–15)
NEUTROPHILS # BLD AUTO: 6.8 K/UL (ref 1.8–7.7)
NEUTROPHILS NFR BLD: 71 % (ref 38–73)
NRBC BLD-RTO: 0 /100 WBC
OVALOCYTES BLD QL SMEAR: ABNORMAL
PCO2 BLDA: 82.4 MMHG (ref 35–45)
PH SMN: 7.27 [PH] (ref 7.35–7.45)
PHOSPHATE SERPL-MCNC: 2.7 MG/DL (ref 2.7–4.5)
PLATELET # BLD AUTO: 170 K/UL (ref 150–450)
PLATELET BLD QL SMEAR: ABNORMAL
PMV BLD AUTO: 13.4 FL (ref 9.2–12.9)
PO2 BLDA: 33 MMHG (ref 40–60)
POC BE: 11 MMOL/L
POC IONIZED CALCIUM: 1.3 MMOL/L (ref 1.06–1.42)
POC SATURATED O2: 52 % (ref 95–100)
POCT GLUCOSE: 124 MG/DL (ref 70–110)
POCT GLUCOSE: 157 MG/DL (ref 70–110)
POCT GLUCOSE: 88 MG/DL (ref 70–110)
POIKILOCYTOSIS BLD QL SMEAR: SLIGHT
POLYCHROMASIA BLD QL SMEAR: ABNORMAL
POTASSIUM BLD-SCNC: 4.5 MMOL/L (ref 3.5–5.1)
POTASSIUM SERPL-SCNC: 4.4 MMOL/L (ref 3.5–5.1)
PROT SERPL-MCNC: 5 G/DL (ref 6–8.4)
RBC # BLD AUTO: 3 M/UL (ref 4–5.4)
SAMPLE: ABNORMAL
SITE: ABNORMAL
SODIUM BLD-SCNC: 143 MMOL/L (ref 136–145)
SODIUM SERPL-SCNC: 145 MMOL/L (ref 136–145)
WBC # BLD AUTO: 9.62 K/UL (ref 3.9–12.7)

## 2023-09-01 PROCEDURE — 25000242 PHARM REV CODE 250 ALT 637 W/ HCPCS

## 2023-09-01 PROCEDURE — 85025 COMPLETE CBC W/AUTO DIFF WBC: CPT

## 2023-09-01 PROCEDURE — 99232 SBSQ HOSP IP/OBS MODERATE 35: CPT | Mod: GC,,, | Performed by: INTERNAL MEDICINE

## 2023-09-01 PROCEDURE — 25000003 PHARM REV CODE 250

## 2023-09-01 PROCEDURE — 84100 ASSAY OF PHOSPHORUS: CPT

## 2023-09-01 PROCEDURE — 63600175 PHARM REV CODE 636 W HCPCS: Performed by: STUDENT IN AN ORGANIZED HEALTH CARE EDUCATION/TRAINING PROGRAM

## 2023-09-01 PROCEDURE — 82803 BLOOD GASES ANY COMBINATION: CPT

## 2023-09-01 PROCEDURE — 27100171 HC OXYGEN HIGH FLOW UP TO 24 HOURS

## 2023-09-01 PROCEDURE — 99900035 HC TECH TIME PER 15 MIN (STAT)

## 2023-09-01 PROCEDURE — 80053 COMPREHEN METABOLIC PANEL: CPT

## 2023-09-01 PROCEDURE — 94640 AIRWAY INHALATION TREATMENT: CPT

## 2023-09-01 PROCEDURE — 94761 N-INVAS EAR/PLS OXIMETRY MLT: CPT

## 2023-09-01 PROCEDURE — 27000190 HC CPAP FULL FACE MASK W/VALVE

## 2023-09-01 PROCEDURE — 99232 PR SUBSEQUENT HOSPITAL CARE,LEVL II: ICD-10-PCS | Mod: GC,,, | Performed by: INTERNAL MEDICINE

## 2023-09-01 PROCEDURE — 83880 ASSAY OF NATRIURETIC PEPTIDE: CPT | Performed by: INTERNAL MEDICINE

## 2023-09-01 PROCEDURE — 83735 ASSAY OF MAGNESIUM: CPT

## 2023-09-01 PROCEDURE — 63600175 PHARM REV CODE 636 W HCPCS

## 2023-09-01 PROCEDURE — 36415 COLL VENOUS BLD VENIPUNCTURE: CPT

## 2023-09-01 PROCEDURE — 20000000 HC ICU ROOM

## 2023-09-01 PROCEDURE — 94660 CPAP INITIATION&MGMT: CPT

## 2023-09-01 RX ORDER — IPRATROPIUM BROMIDE AND ALBUTEROL SULFATE 2.5; .5 MG/3ML; MG/3ML
3 SOLUTION RESPIRATORY (INHALATION) EVERY 4 HOURS
Status: DISCONTINUED | OUTPATIENT
Start: 2023-09-01 | End: 2023-09-05 | Stop reason: HOSPADM

## 2023-09-01 RX ORDER — TALC
6 POWDER (GRAM) TOPICAL NIGHTLY
Status: DISCONTINUED | OUTPATIENT
Start: 2023-09-01 | End: 2023-09-05 | Stop reason: HOSPADM

## 2023-09-01 RX ORDER — METOPROLOL SUCCINATE 100 MG/1
200 TABLET, EXTENDED RELEASE ORAL DAILY
Status: DISCONTINUED | OUTPATIENT
Start: 2023-09-02 | End: 2023-09-05 | Stop reason: HOSPADM

## 2023-09-01 RX ORDER — FUROSEMIDE 20 MG/1
20 TABLET ORAL DAILY
Status: DISCONTINUED | OUTPATIENT
Start: 2023-09-02 | End: 2023-09-05 | Stop reason: HOSPADM

## 2023-09-01 RX ORDER — ALPRAZOLAM 0.25 MG/1
0.25 TABLET ORAL NIGHTLY PRN
Status: DISCONTINUED | OUTPATIENT
Start: 2023-09-01 | End: 2023-09-05 | Stop reason: HOSPADM

## 2023-09-01 RX ORDER — FUROSEMIDE 10 MG/ML
40 INJECTION INTRAMUSCULAR; INTRAVENOUS ONCE
Status: COMPLETED | OUTPATIENT
Start: 2023-09-01 | End: 2023-09-01

## 2023-09-01 RX ADMIN — METOPROLOL SUCCINATE 125 MG: 100 TABLET, EXTENDED RELEASE ORAL at 11:09

## 2023-09-01 RX ADMIN — AMLODIPINE BESYLATE 10 MG: 10 TABLET ORAL at 08:09

## 2023-09-01 RX ADMIN — IPRATROPIUM BROMIDE AND ALBUTEROL SULFATE 3 ML: 2.5; .5 SOLUTION RESPIRATORY (INHALATION) at 11:09

## 2023-09-01 RX ADMIN — METOPROLOL TARTRATE 75 MG: 50 TABLET, FILM COATED ORAL at 08:09

## 2023-09-01 RX ADMIN — Medication 6 MG: at 08:09

## 2023-09-01 RX ADMIN — IPRATROPIUM BROMIDE AND ALBUTEROL SULFATE 3 ML: 2.5; .5 SOLUTION RESPIRATORY (INHALATION) at 07:09

## 2023-09-01 RX ADMIN — ENOXAPARIN SODIUM 40 MG: 40 INJECTION SUBCUTANEOUS at 04:09

## 2023-09-01 RX ADMIN — FUROSEMIDE 40 MG: 10 INJECTION, SOLUTION INTRAVENOUS at 04:09

## 2023-09-01 RX ADMIN — IPRATROPIUM BROMIDE AND ALBUTEROL SULFATE 3 ML: .5; 3 SOLUTION RESPIRATORY (INHALATION) at 03:09

## 2023-09-01 RX ADMIN — ASPIRIN 81 MG 81 MG: 81 TABLET ORAL at 08:09

## 2023-09-01 RX ADMIN — CLOPIDOGREL BISULFATE 75 MG: 75 TABLET ORAL at 08:09

## 2023-09-01 NOTE — PLAN OF CARE
Plan of care reviewed with patient. All questions, comments concerns addressed. Patient AAOx4. Telemetry monitoring in progress.  Nasal cannul 4L. Vitals q 4hrs and prn. Bed in lowest possible position. Call light within reach.    Problem: Adult Inpatient Plan of Care  Goal: Plan of Care Review  Outcome: Ongoing, Progressing  Goal: Patient-Specific Goal (Individualized)  Outcome: Ongoing, Progressing  Goal: Absence of Hospital-Acquired Illness or Injury  Outcome: Ongoing, Progressing  Goal: Readiness for Transition of Care  Outcome: Ongoing, Progressing     Problem: Diabetes Comorbidity  Goal: Blood Glucose Level Within Targeted Range  Outcome: Ongoing, Progressing     Problem: Impaired Wound Healing  Goal: Optimal Wound Healing  Outcome: Ongoing, Progressing     Problem: Skin Injury Risk Increased  Goal: Skin Health and Integrity  Outcome: Ongoing, Progressing

## 2023-09-01 NOTE — CARE UPDATE
Patient had significant SOB. VBG showed worsening hypercapnea. Bipap placed and patient transferred to ICU.    #acute on chronic hypercapnic respiratory failure  - BiPAP 12/5  - transfer to ICU  - lasix increased  - f/u VBG 1 hour  - scheduled duonebs

## 2023-09-01 NOTE — PT/OT/SLP PROGRESS
Occupational Therapy      Patient Name:  Marisol Kerr   MRN:  [id number]    Patient not seen today secondary to Patient unwilling to participate at OT attempt at [phone]. Patient reports medical team instructed her to have HOB elevated. Patient educated on importance of OOB activity and risks of sedentary behavior. Patient verbalized understanding but still unwilling to get Oob at this time. Writing OT unable to make additional attempts. Will follow-up as appropriate.    9/1/2023

## 2023-09-01 NOTE — PROGRESS NOTES
[facility]  Cardiology  Progress Note    Patient Name: Marisol Kerr  MRN: [id number]  Admission Date: 8/29/2023  Hospital Length of Stay: 3 days  Code Status: Full Code   Attending Physician: Nba Riggs MD   Primary Care Physician: Khadar Dwyer MD  Expected Discharge Date: 9/1/2023  Principal Problem:Acute on chronic respiratory failure    Subjective:     Hospital Course:   Patient admitted to CCU for further management. Patient underwent LHC which showed severe stenosis of Lcx with iFR 0.74 and  of the mRCA. Patient had BANDAR to Lcx. PT/OT recommending SNF. Patient pending SNF placement.      Interval History: Patient had SOB overnight. VBG showed some evidence of hypercapnia. Patient placed on CPAP. Repeat VBG pending. Pending SNF placement    Review of Systems   Constitutional: Negative for chills, diaphoresis, fever, malaise/fatigue, weight gain and weight loss.   HENT:  Negative for ear pain, nosebleeds and odynophagia.    Eyes:  Negative for blurred vision, double vision, vision loss in left eye, vision loss in right eye and visual disturbance.   Cardiovascular:  Negative for chest pain, dyspnea on exertion, leg swelling, near-syncope, palpitations and syncope.   Respiratory:  Negative for shortness of breath and wheezing.    Hematologic/Lymphatic: Negative for bleeding problem. Does not bruise/bleed easily.   Skin:  Negative for poor wound healing and rash.   Musculoskeletal:  Negative for back pain, joint pain and neck pain.   Gastrointestinal:  Negative for abdominal pain, change in bowel habit, constipation, diarrhea, hematemesis, hematochezia, melena, nausea and vomiting.   Genitourinary:  Negative for dysuria, flank pain, hematuria and pelvic pain.   Neurological:  Negative for dizziness, focal weakness, headaches, light-headedness, seizures and weakness.   Psychiatric/Behavioral:  Negative for memory loss. The patient is nervous/anxious. The patient does not have  insomnia.      Objective:     Vital Signs (Most Recent):  Temp: 98.4 °F (36.9 °C) (09/01/23 0603)  Pulse: 62 (09/01/23 0603)  Resp: 17 (09/01/23 0603)  BP: (!) 109/47 (09/01/23 0603)  SpO2: 99 % (09/01/23 0603) Vital Signs (24h Range):  Temp:  [97.7 °F (36.5 °C)-98.6 °F (37 °C)] 98.4 °F (36.9 °C)  Pulse:  [60-75] 62  Resp:  [16-22] 17  SpO2:  [92 %-99 %] 99 %  BP: (109-144)/(47-79) 109/47     Weight: 81.2 kg (179 lb 0.2 oz) (Bed Scale)  Body mass index is 31.71 kg/m².     SpO2: 99 %         Intake/Output Summary (Last 24 hours) at 9/1/2023 0713  Last data filed at 9/1/2023 0627  Gross per 24 hour   Intake 480 ml   Output 2125 ml   Net -1645 ml         Lines/Drains/Airways       Drain  Duration             Female External Urinary Catheter 08/29/23 1703 2 days              Peripheral Intravenous Line  Duration                  Peripheral IV - Single Lumen 08/27/23 2145 20 G Anterior;Left Forearm 4 days                       Physical Exam  Vitals and nursing note reviewed.   Constitutional:       General: She is not in acute distress.     Appearance: Normal appearance. She is obese. She is not ill-appearing or diaphoretic.   HENT:      Head: Normocephalic and atraumatic.      Nose: Nose normal.      Mouth/Throat:      Mouth: Mucous membranes are moist.      Pharynx: Oropharynx is clear.   Eyes:      Extraocular Movements: Extraocular movements intact.   Cardiovascular:      Rate and Rhythm: Normal rate and regular rhythm.      Pulses: Normal pulses.      Heart sounds: Murmur heard.   Pulmonary:      Effort: Pulmonary effort is normal.      Breath sounds: Normal breath sounds. No wheezing or rales.   Abdominal:      General: Bowel sounds are normal.      Palpations: Abdomen is soft.      Tenderness: There is no abdominal tenderness.      Hernia: A hernia (R hernia abdominal hernia, reducable, non-painful) is present.   Musculoskeletal:         General: Normal range of motion.      Cervical back: Normal range of motion  and neck supple.      Right lower leg: No edema.      Left lower leg: No edema.   Skin:     General: Skin is warm and dry.   Neurological:      General: No focal deficit present.      Mental Status: She is alert and oriented to person, place, and time.   Psychiatric:         Mood and Affect: Mood normal.         Behavior: Behavior normal.            Significant Labs: All pertinent lab results from the last 24 hours have been reviewed.    Significant Imaging:  all imaging reviewed    Assessment and Plan:       * Acute on chronic respiratory failure  Patient with Hypercapnic and Hypoxic Respiratory failure which is Chronic.  she is on home oxygen at 4 LPM. Supplemental oxygen was provided and noted- Oxygen Concentration (%):  [40] 40    .   Signs/symptoms of respiratory failure include- tachypnea. Contributing diagnoses includes - CHF and COPD Labs and images were reviewed. Patient Has not had a recent ABG. Will treat underlying causes and adjust management of respiratory failure as follows-     VBG showed hypercapnia. trialled on CPAP. Repeat VBG pending    Chronic diastolic congestive heart failure  Patient is identified as having Diastolic (HFpEF) heart failure that is Chronic. CHF is currently controlled. Latest ECHO performed and demonstrates- Results for orders placed during the hospital encounter of 08/25/23    Echo Saline Bubble? No    Interpretation Summary    Left Ventricle: The left ventricle is normal in size. Moderately increased ventricular mass. Moderately increased wall thickness. There is moderate concentrict hypertrophy. Normal wall motion. There is normal systolic function.  EF 61% There is normal diastolic function.    Left Atrium: Left atrium is moderately dilated.    Right Ventricle: Normal right ventricular cavity size. Wall thickness is normal. Right ventricle wall motion  is normal. Systolic function is normal.    Mitral Valve: Mildly thickened anterior leaflet. Mild mitral annular  calcification.    IVC/SVC: Normal venous pressure at 3 mmHg.    Pericardium: There is an effusion.    Limited echo; no gross pulmonary hypertension but poor visualization of tricuspid signal    No tissue Doppler performed to assess mean left atrial pressure  . Continue Beta Blocker, Furosemide and Nitrate/Vasodilator and monitor clinical status closely. Monitor on telemetry. Patient is off CHF pathway.  Monitor strict Is&Os and daily weights.  Place on fluid restriction of 1.5 L. Continue to stress to patient importance of self efficacy and  on diet for CHF. Last BNP reviewed- and noted below   Recent Labs   Lab 08/29/23  0855   *   .    Chronic kidney disease, stage 4 (severe)  Patient with chronic CKD baseline Cr 1.5-1.7.     - avoid nephrotoxic agents or renally dose medications  - trend Cr    Gastroesophageal reflux disease without esophagitis  Continue ppi    DM type 2, controlled, with complication  Last A1c on 8/25 showed 5.0. BG slightly elevated.     - LDSSI  - POCT glucose    Essential hypertension, benign  continue amlodipine    Hypercholesterolemia  Statin held at OSH    - will resume when clinically indicated    Debility  Patient mostly bedbound. Had rehab in the past. Will have PT/OT eval for discharge needs    Unstable angina  78 yoF admitted for ongoing chest pain. Noted to have elevated troponins at OSH but here had troponin of 0.013. patient has some chest pain but only when anxious. Patient has severe multivessel disease. Patient transferred for LHC. Patient underwent LHC which showed severe stenosis of Lcx with iFR 0.74 and  of the mRCA. Patient had BANDAR to Lcx.      - continue DAPT        VTE Risk Mitigation (From admission, onward)         Ordered     enoxaparin injection 40 mg  Every 24 hours         08/30/23 0922     IP VTE HIGH RISK PATIENT  Once         08/29/23 0730     Place sequential compression device  Until discontinued         08/29/23 0730                Becca  MD Kavon  Cardiology  [facility]

## 2023-09-01 NOTE — ASSESSMENT & PLAN NOTE
Patient with Hypercapnic and Hypoxic Respiratory failure which is Chronic.  she is on home oxygen at 4 LPM. Supplemental oxygen was provided and noted- Oxygen Concentration (%):  [40] 40    .   Signs/symptoms of respiratory failure include- tachypnea. Contributing diagnoses includes - CHF and COPD Labs and images were reviewed. Patient Has not had a recent ABG. Will treat underlying causes and adjust management of respiratory failure as follows-     VBG showed hypercapnia. trialled on CPAP. Repeat VBG pending

## 2023-09-01 NOTE — NURSING
Patient complaining of being short of breath. O2 98 percent in 4L, respirations 22. Patient asked for cpap to be placed on. Cpap placed.

## 2023-09-01 NOTE — SUBJECTIVE & OBJECTIVE
Interval History: Patient had SOB overnight. VBG showed some evidence of hypercapnia. Patient placed on CPAP. Repeat VBG pending. Pending SNF placement    Review of Systems   Constitutional: Negative for chills, diaphoresis, fever, malaise/fatigue, weight gain and weight loss.   HENT:  Negative for ear pain, nosebleeds and odynophagia.    Eyes:  Negative for blurred vision, double vision, vision loss in left eye, vision loss in right eye and visual disturbance.   Cardiovascular:  Negative for chest pain, dyspnea on exertion, leg swelling, near-syncope, palpitations and syncope.   Respiratory:  Negative for shortness of breath and wheezing.    Hematologic/Lymphatic: Negative for bleeding problem. Does not bruise/bleed easily.   Skin:  Negative for poor wound healing and rash.   Musculoskeletal:  Negative for back pain, joint pain and neck pain.   Gastrointestinal:  Negative for abdominal pain, change in bowel habit, constipation, diarrhea, hematemesis, hematochezia, melena, nausea and vomiting.   Genitourinary:  Negative for dysuria, flank pain, hematuria and pelvic pain.   Neurological:  Negative for dizziness, focal weakness, headaches, light-headedness, seizures and weakness.   Psychiatric/Behavioral:  Negative for memory loss. The patient is nervous/anxious. The patient does not have insomnia.      Objective:     Vital Signs (Most Recent):  Temp: 98.4 °F (36.9 °C) (09/01/23 0603)  Pulse: 62 (09/01/23 0603)  Resp: 17 (09/01/23 0603)  BP: (!) 109/47 (09/01/23 0603)  SpO2: 99 % (09/01/23 0603) Vital Signs (24h Range):  Temp:  [97.7 °F (36.5 °C)-98.6 °F (37 °C)] 98.4 °F (36.9 °C)  Pulse:  [60-75] 62  Resp:  [16-22] 17  SpO2:  [92 %-99 %] 99 %  BP: (109-144)/(47-79) 109/47     Weight: 81.2 kg (179 lb 0.2 oz) (Bed Scale)  Body mass index is 31.71 kg/m².     SpO2: 99 %         Intake/Output Summary (Last 24 hours) at 9/1/2023 0713  Last data filed at 9/1/2023 0627  Gross per 24 hour   Intake 480 ml   Output 2125 ml    Net -1645 ml         Lines/Drains/Airways       Drain  Duration             Female External Urinary Catheter 08/29/23 1703 2 days              Peripheral Intravenous Line  Duration                  Peripheral IV - Single Lumen 08/27/23 2145 20 G Anterior;Left Forearm 4 days                       Physical Exam  Vitals and nursing note reviewed.   Constitutional:       General: She is not in acute distress.     Appearance: Normal appearance. She is obese. She is not ill-appearing or diaphoretic.   HENT:      Head: Normocephalic and atraumatic.      Nose: Nose normal.      Mouth/Throat:      Mouth: Mucous membranes are moist.      Pharynx: Oropharynx is clear.   Eyes:      Extraocular Movements: Extraocular movements intact.   Cardiovascular:      Rate and Rhythm: Normal rate and regular rhythm.      Pulses: Normal pulses.      Heart sounds: Murmur heard.   Pulmonary:      Effort: Pulmonary effort is normal.      Breath sounds: Normal breath sounds. No wheezing or rales.   Abdominal:      General: Bowel sounds are normal.      Palpations: Abdomen is soft.      Tenderness: There is no abdominal tenderness.      Hernia: A hernia (R hernia abdominal hernia, reducable, non-painful) is present.   Musculoskeletal:         General: Normal range of motion.      Cervical back: Normal range of motion and neck supple.      Right lower leg: No edema.      Left lower leg: No edema.   Skin:     General: Skin is warm and dry.   Neurological:      General: No focal deficit present.      Mental Status: She is alert and oriented to person, place, and time.   Psychiatric:         Mood and Affect: Mood normal.         Behavior: Behavior normal.            Significant Labs: All pertinent lab results from the last 24 hours have been reviewed.    Significant Imaging:  all imaging reviewed

## 2023-09-01 NOTE — PLAN OF CARE
09/01/23 1625   Post-Acute Status   Post-Acute Authorization Placement   Post-Acute Placement Status Pending medical clearance/testing     Per charge RN pt is being transferred to ICU.  ICU CM/SW to follow.      Ronda Esquivel LMSW  [facility]  [id number]

## 2023-09-01 NOTE — CARE UPDATE
Pt complaining of SOB. O2 sat 94% on 4LNC, PRN breathing treatment requested and administered. Pt continues to complain of SOB. MD notified, CPAP ordered. RT at bedside. Pt tolerating well. O2 sat remains at 94% .Will continue to assess.

## 2023-09-02 LAB
ALBUMIN SERPL BCP-MCNC: 2.3 G/DL (ref 3.5–5.2)
ALP SERPL-CCNC: 61 U/L (ref 55–135)
ALT SERPL W/O P-5'-P-CCNC: 6 U/L (ref 10–44)
ANION GAP SERPL CALC-SCNC: 10 MMOL/L (ref 8–16)
AST SERPL-CCNC: 13 U/L (ref 10–40)
BASOPHILS # BLD AUTO: 0.03 K/UL (ref 0–0.2)
BASOPHILS NFR BLD: 0.4 % (ref 0–1.9)
BILIRUB SERPL-MCNC: 0.3 MG/DL (ref 0.1–1)
BUN SERPL-MCNC: 46 MG/DL (ref 8–23)
CALCIUM SERPL-MCNC: 8.8 MG/DL (ref 8.7–10.5)
CHLORIDE SERPL-SCNC: 105 MMOL/L (ref 95–110)
CO2 SERPL-SCNC: 29 MMOL/L (ref 23–29)
CREAT SERPL-MCNC: 1.1 MG/DL (ref 0.5–1.4)
DIFFERENTIAL METHOD: ABNORMAL
EOSINOPHIL # BLD AUTO: 0.1 K/UL (ref 0–0.5)
EOSINOPHIL NFR BLD: 1.5 % (ref 0–8)
ERYTHROCYTE [DISTWIDTH] IN BLOOD BY AUTOMATED COUNT: 14.6 % (ref 11.5–14.5)
EST. GFR  (NO RACE VARIABLE): 52.1 ML/MIN/1.73 M^2
GLUCOSE SERPL-MCNC: 86 MG/DL (ref 70–110)
HCT VFR BLD AUTO: 25.8 % (ref 37–48.5)
HGB BLD-MCNC: 7.8 G/DL (ref 12–16)
IMM GRANULOCYTES # BLD AUTO: 0.03 K/UL (ref 0–0.04)
IMM GRANULOCYTES NFR BLD AUTO: 0.4 % (ref 0–0.5)
LYMPHOCYTES # BLD AUTO: 1.8 K/UL (ref 1–4.8)
LYMPHOCYTES NFR BLD: 22 % (ref 18–48)
MAGNESIUM SERPL-MCNC: 2.1 MG/DL (ref 1.6–2.6)
MCH RBC QN AUTO: 27.9 PG (ref 27–31)
MCHC RBC AUTO-ENTMCNC: 30.2 G/DL (ref 32–36)
MCV RBC AUTO: 92 FL (ref 82–98)
MONOCYTES # BLD AUTO: 0.6 K/UL (ref 0.3–1)
MONOCYTES NFR BLD: 6.9 % (ref 4–15)
NEUTROPHILS # BLD AUTO: 5.5 K/UL (ref 1.8–7.7)
NEUTROPHILS NFR BLD: 68.8 % (ref 38–73)
NRBC BLD-RTO: 0 /100 WBC
PHOSPHATE SERPL-MCNC: 2.6 MG/DL (ref 2.7–4.5)
PLATELET # BLD AUTO: 184 K/UL (ref 150–450)
PMV BLD AUTO: 12.4 FL (ref 9.2–12.9)
POCT GLUCOSE: 127 MG/DL (ref 70–110)
POCT GLUCOSE: 218 MG/DL (ref 70–110)
POCT GLUCOSE: 94 MG/DL (ref 70–110)
POTASSIUM SERPL-SCNC: 3.7 MMOL/L (ref 3.5–5.1)
POTASSIUM SERPL-SCNC: 4.3 MMOL/L (ref 3.5–5.1)
PROT SERPL-MCNC: 5.1 G/DL (ref 6–8.4)
RBC # BLD AUTO: 2.8 M/UL (ref 4–5.4)
SODIUM SERPL-SCNC: 144 MMOL/L (ref 136–145)
WBC # BLD AUTO: 7.99 K/UL (ref 3.9–12.7)

## 2023-09-02 PROCEDURE — 99900035 HC TECH TIME PER 15 MIN (STAT)

## 2023-09-02 PROCEDURE — 84100 ASSAY OF PHOSPHORUS: CPT

## 2023-09-02 PROCEDURE — 27100171 HC OXYGEN HIGH FLOW UP TO 24 HOURS

## 2023-09-02 PROCEDURE — 25000003 PHARM REV CODE 250

## 2023-09-02 PROCEDURE — 99232 SBSQ HOSP IP/OBS MODERATE 35: CPT | Mod: GC,,, | Performed by: INTERNAL MEDICINE

## 2023-09-02 PROCEDURE — 25000003 PHARM REV CODE 250: Performed by: STUDENT IN AN ORGANIZED HEALTH CARE EDUCATION/TRAINING PROGRAM

## 2023-09-02 PROCEDURE — 84132 ASSAY OF SERUM POTASSIUM: CPT | Performed by: INTERNAL MEDICINE

## 2023-09-02 PROCEDURE — 63600175 PHARM REV CODE 636 W HCPCS

## 2023-09-02 PROCEDURE — 94761 N-INVAS EAR/PLS OXIMETRY MLT: CPT

## 2023-09-02 PROCEDURE — 85025 COMPLETE CBC W/AUTO DIFF WBC: CPT

## 2023-09-02 PROCEDURE — 25000242 PHARM REV CODE 250 ALT 637 W/ HCPCS

## 2023-09-02 PROCEDURE — 36415 COLL VENOUS BLD VENIPUNCTURE: CPT | Performed by: INTERNAL MEDICINE

## 2023-09-02 PROCEDURE — 20600001 HC STEP DOWN PRIVATE ROOM

## 2023-09-02 PROCEDURE — 83735 ASSAY OF MAGNESIUM: CPT

## 2023-09-02 PROCEDURE — 99232 PR SUBSEQUENT HOSPITAL CARE,LEVL II: ICD-10-PCS | Mod: GC,,, | Performed by: INTERNAL MEDICINE

## 2023-09-02 PROCEDURE — 80053 COMPREHEN METABOLIC PANEL: CPT

## 2023-09-02 PROCEDURE — 94640 AIRWAY INHALATION TREATMENT: CPT

## 2023-09-02 RX ORDER — POTASSIUM CHLORIDE 20 MEQ/1
40 TABLET, EXTENDED RELEASE ORAL ONCE
Status: COMPLETED | OUTPATIENT
Start: 2023-09-02 | End: 2023-09-02

## 2023-09-02 RX ADMIN — ENOXAPARIN SODIUM 40 MG: 40 INJECTION SUBCUTANEOUS at 04:09

## 2023-09-02 RX ADMIN — ASPIRIN 81 MG 81 MG: 81 TABLET ORAL at 08:09

## 2023-09-02 RX ADMIN — IPRATROPIUM BROMIDE AND ALBUTEROL SULFATE 3 ML: 2.5; .5 SOLUTION RESPIRATORY (INHALATION) at 01:09

## 2023-09-02 RX ADMIN — POTASSIUM CHLORIDE 40 MEQ: 1500 TABLET, EXTENDED RELEASE ORAL at 06:09

## 2023-09-02 RX ADMIN — CLOPIDOGREL BISULFATE 75 MG: 75 TABLET ORAL at 08:09

## 2023-09-02 RX ADMIN — IPRATROPIUM BROMIDE AND ALBUTEROL SULFATE 3 ML: 2.5; .5 SOLUTION RESPIRATORY (INHALATION) at 08:09

## 2023-09-02 RX ADMIN — AMLODIPINE BESYLATE 10 MG: 10 TABLET ORAL at 08:09

## 2023-09-02 RX ADMIN — FUROSEMIDE 20 MG: 20 TABLET ORAL at 08:09

## 2023-09-02 RX ADMIN — Medication 6 MG: at 08:09

## 2023-09-02 RX ADMIN — ALPRAZOLAM 0.25 MG: 0.25 TABLET ORAL at 08:09

## 2023-09-02 RX ADMIN — IPRATROPIUM BROMIDE AND ALBUTEROL SULFATE 3 ML: 2.5; .5 SOLUTION RESPIRATORY (INHALATION) at 05:09

## 2023-09-02 RX ADMIN — METOPROLOL SUCCINATE 200 MG: 100 TABLET, EXTENDED RELEASE ORAL at 08:09

## 2023-09-02 NOTE — PLAN OF CARE
"Cardiac ICU Care Plan    POC reviewed with Marisol Kerr and family. Questions and concerns addressed. Pt progressing toward goals. Will continue to monitor. See below and flowsheets for full assessment and VS info.       Neuro:  Crockett Coma Scale  Best Eye Response: 4-->(E4) spontaneous  Best Motor Response: 6-->(M6) obeys commands  Best Verbal Response: 5-->(V5) oriented  Suzi Coma Scale Score: 15  Assessment Qualifiers: patient not sedated/intubated  Pupil PERRLA: yes    24 hr Temp:  [96.5 °F (35.8 °C)-99.6 °F (37.6 °C)]      CV:  Rhythm: sinus bradycardia   DVT prophylaxis: VTE Required Core Measure: Pharmacological prophylaxis initiated/maintained                            Pulses  Right Radial Pulse: 2+ (normal)  Left Radial Pulse: 2+ (normal)  Right Dorsalis Pedis Pulse: 1+ (weak)  Left Dorsalis Pedis Pulse: 1+ (weak)  Right Posterior Tibial Pulse: 1+ (weak)  Left Posterior Tibial Pulse: 1+ (weak)    Resp:  Flow (L/min): 4  Oxygen Concentration (%): 40    GI/:  GI prophylaxis: yes  Diet/Nutrition Received: consistent carb/diabetic diet  Last Bowel Movement: 08/31/23  Voiding Characteristics: external catheter   Intake/Output Summary (Last 24 hours) at 9/2/2023 0638  Last data filed at 9/2/2023 0605  Gross per 24 hour   Intake --   Output 2000 ml   Net -2000 ml        Nutritional Supplement Intake: Quantity none, Type: Boost    Labs/Accuchecks:  Recent Labs   Lab 08/31/23  0440 08/31/23  1816 09/01/23  0350 09/01/23  1554 09/02/23  0432   WBC 10.89  --  9.62  --  7.99   RBC 2.85*  --  3.00*  --  2.80*   HGB 7.9*  --  8.5*  --  7.8*   HCT 27.1*   < > 28.4* 35* 25.8*     --  170  --  184    < > = values in this interval not displayed.    No results for input(s): "PT", "INR", "APTT" in the last 168 hours.   Recent Labs     09/02/23  0432      K 3.7   CO2 29      BUN 46*   CREATININE 1.1   ALKPHOS 61   ALT 6*   AST 13   BILITOT 0.3       Recent Labs   Lab 08/29/23  0855   TROPONINI 0.013    "   Recent Labs     08/31/23  1816 09/01/23  1554   PH 7.289* 7.268*   PCO2 77.2* 82.4*   PO2 23* 33*   POCSATURATED 31* 52*   BE 10 11       Electrolytes: Electrolytes replaced  Accuchecks: ACHS    Gtts/LDAs:      Lines/Drains/Airways       Drain  Duration             Female External Urinary Catheter 08/29/23 1703 3 days              Peripheral Intravenous Line  Duration                  Peripheral IV - Single Lumen 08/27/23 2145 20 G Anterior;Left Forearm 5 days                    Skin/Wounds  Bathing/Skin Care: back care;bath, complete;dressed/undressed;foot care;linen changed (08/31/23 0715)  Wounds: No  Wound care consulted: No

## 2023-09-02 NOTE — PLAN OF CARE
Problem: Adult Inpatient Plan of Care  Goal: Plan of Care Review  Outcome: Ongoing, Progressing  Goal: Optimal Comfort and Wellbeing  Outcome: Ongoing, Progressing  Goal: Readiness for Transition of Care  Outcome: Ongoing, Progressing     Problem: Diabetes Comorbidity  Goal: Blood Glucose Level Within Targeted Range  Outcome: Ongoing, Progressing     Problem: Impaired Wound Healing  Goal: Optimal Wound Healing  Outcome: Ongoing, Progressing     Problem: Skin Injury Risk Increased  Goal: Skin Health and Integrity  Outcome: Ongoing, Progressing     Problem: Fall Injury Risk  Goal: Absence of Fall and Fall-Related Injury  Outcome: Ongoing, Progressing

## 2023-09-02 NOTE — SUBJECTIVE & OBJECTIVE
Interval History: patient stepped down from ICU. NAEON and VSS.     Review of Systems   Constitutional: Negative for chills, diaphoresis, fever, malaise/fatigue, weight gain and weight loss.   HENT:  Negative for ear pain, nosebleeds and odynophagia.    Eyes:  Negative for blurred vision, double vision, vision loss in left eye, vision loss in right eye and visual disturbance.   Cardiovascular:  Negative for chest pain, dyspnea on exertion, leg swelling, near-syncope, palpitations and syncope.   Respiratory:  Negative for shortness of breath and wheezing.    Hematologic/Lymphatic: Negative for bleeding problem. Does not bruise/bleed easily.   Skin:  Negative for poor wound healing and rash.   Musculoskeletal:  Negative for back pain, joint pain and neck pain.   Gastrointestinal:  Negative for abdominal pain, change in bowel habit, constipation, diarrhea, hematemesis, hematochezia, melena, nausea and vomiting.   Genitourinary:  Negative for dysuria, flank pain, hematuria and pelvic pain.   Neurological:  Negative for dizziness, focal weakness, headaches, light-headedness, seizures and weakness.   Psychiatric/Behavioral:  Negative for memory loss. The patient is nervous/anxious. The patient does not have insomnia.      Objective:     Vital Signs (Most Recent):  Temp: 98 °F (36.7 °C) (09/02/23 1030)  Pulse: 66 (09/02/23 1056)  Resp: (!) 21 (09/02/23 1030)  BP: 137/60 (09/02/23 1030)  SpO2: 98 % (09/02/23 1056) Vital Signs (24h Range):  Temp:  [97.6 °F (36.4 °C)-99.6 °F (37.6 °C)] 98 °F (36.7 °C)  Pulse:  [54-88] 66  Resp:  [17-49] 21  SpO2:  [94 %-100 %] 98 %  BP: (134-184)/(60-98) 137/60     Weight: 69 kg (152 lb 1.9 oz)  Body mass index is 26.95 kg/m².     SpO2: 98 %         Intake/Output Summary (Last 24 hours) at 9/2/2023 1215  Last data filed at 9/2/2023 1000  Gross per 24 hour   Intake 240 ml   Output 2450 ml   Net -2210 ml       Lines/Drains/Airways       Drain  Duration             Female External Urinary  Catheter 08/29/23 1703 3 days              Peripheral Intravenous Line  Duration                  Peripheral IV - Single Lumen 08/27/23 2145 20 G Anterior;Left Forearm 5 days                       Physical Exam  Vitals and nursing note reviewed.   Constitutional:       General: She is not in acute distress.     Appearance: Normal appearance. She is obese. She is not ill-appearing or diaphoretic.   HENT:      Head: Normocephalic and atraumatic.      Nose: Nose normal.      Mouth/Throat:      Mouth: Mucous membranes are moist.      Pharynx: Oropharynx is clear.   Eyes:      Extraocular Movements: Extraocular movements intact.   Cardiovascular:      Rate and Rhythm: Normal rate and regular rhythm.      Pulses: Normal pulses.      Heart sounds: Murmur heard.   Pulmonary:      Effort: Pulmonary effort is normal.      Breath sounds: Normal breath sounds. No wheezing or rales.   Abdominal:      General: Bowel sounds are normal.      Palpations: Abdomen is soft.      Tenderness: There is no abdominal tenderness.      Hernia: A hernia (R hernia abdominal hernia, reducable, non-painful) is present.   Musculoskeletal:         General: Normal range of motion.      Cervical back: Normal range of motion and neck supple.      Right lower leg: No edema.      Left lower leg: No edema.   Skin:     General: Skin is warm and dry.   Neurological:      General: No focal deficit present.      Mental Status: She is alert and oriented to person, place, and time.   Psychiatric:         Mood and Affect: Mood normal.         Behavior: Behavior normal.            Significant Labs:  all labs reviewed    Significant Imaging:  all imaging reviewed

## 2023-09-02 NOTE — ASSESSMENT & PLAN NOTE
Patient with Hypercapnic and Hypoxic Respiratory failure which is Chronic.  she is on home oxygen at 4 LPM. Supplemental oxygen was provided and noted-      .   Signs/symptoms of respiratory failure include- tachypnea. Contributing diagnoses includes - CHF and COPD Labs and images were reviewed. Patient Has not had a recent ABG. Will treat underlying causes and adjust management of respiratory failure as follows-     Scheduled inhalers  Continued home benzo  Prn benzo for anxiety

## 2023-09-02 NOTE — NURSING TRANSFER
Nursing Transfer Note      9/1/2023   10:51 PM    Nurse giving handoff:OFELIA Richter  Nurse receiving handoff:OFELIA Weldon    Reason patient is being transferred: CO2 84     [facility]    [facility]    Transfer with O2, cardiac monitoring    Transported by Nurse and charge nurse    Transfer Vital Signs:  Blood Pressure:139/98  Heart Rate:77  O2:95 on 4 liters of O2  Temperature:98.9  Respirations:25    4eyes on Skin: Done on arrival to [place] [id number]    Medicines sent:     Any special needs or follow-up needed: none    Patient belongings transferred with patient: Yes    Chart send with patient: Yes    Notified: Daughter  [facility]    Patient reassessed at: Patient reassess by ICU nurses when arrive on the floor    Upon arrival to floor: cardiac monitor applied, patient oriented to room, call bell in reach, and bed in lowest position

## 2023-09-02 NOTE — PROGRESS NOTES
[facility]  Cardiology  Progress Note    Patient Name: Marisol Kerr  MRN: [id number]  Admission Date: 8/29/2023  Hospital Length of Stay: 4 days  Code Status: Full Code   Attending Physician: Neal Llanes MD   Primary Care Physician: Khadar Dwyer MD  Expected Discharge Date: 9/5/2023  Principal Problem:Acute on chronic respiratory failure    Subjective:     Hospital Course:   Patient admitted to CCU for further management. Patient underwent LHC which showed severe stenosis of Lcx with iFR 0.74 and  of the mRCA. Patient had BANDAR to Lcx. PT/OT recommending SNF. Patient became anxious, VBG showed hypercapnia. Bipap ordered and patient transferred to ICU but patient never obtained Bipap. SOB and anxiety improved and patient was stepped back down. Patient pending SNF placement.      Interval History: patient stepped down from ICU. NAEON and VSS.     Review of Systems   Constitutional: Negative for chills, diaphoresis, fever, malaise/fatigue, weight gain and weight loss.   HENT:  Negative for ear pain, nosebleeds and odynophagia.    Eyes:  Negative for blurred vision, double vision, vision loss in left eye, vision loss in right eye and visual disturbance.   Cardiovascular:  Negative for chest pain, dyspnea on exertion, leg swelling, near-syncope, palpitations and syncope.   Respiratory:  Negative for shortness of breath and wheezing.    Hematologic/Lymphatic: Negative for bleeding problem. Does not bruise/bleed easily.   Skin:  Negative for poor wound healing and rash.   Musculoskeletal:  Negative for back pain, joint pain and neck pain.   Gastrointestinal:  Negative for abdominal pain, change in bowel habit, constipation, diarrhea, hematemesis, hematochezia, melena, nausea and vomiting.   Genitourinary:  Negative for dysuria, flank pain, hematuria and pelvic pain.   Neurological:  Negative for dizziness, focal weakness, headaches, light-headedness, seizures and weakness.    Psychiatric/Behavioral:  Negative for memory loss. The patient is nervous/anxious. The patient does not have insomnia.      Objective:     Vital Signs (Most Recent):  Temp: 98 °F (36.7 °C) (09/02/23 1030)  Pulse: 66 (09/02/23 1056)  Resp: (!) 21 (09/02/23 1030)  BP: 137/60 (09/02/23 1030)  SpO2: 98 % (09/02/23 1056) Vital Signs (24h Range):  Temp:  [97.6 °F (36.4 °C)-99.6 °F (37.6 °C)] 98 °F (36.7 °C)  Pulse:  [54-88] 66  Resp:  [17-49] 21  SpO2:  [94 %-100 %] 98 %  BP: (134-184)/(60-98) 137/60     Weight: 69 kg (152 lb 1.9 oz)  Body mass index is 26.95 kg/m².     SpO2: 98 %         Intake/Output Summary (Last 24 hours) at 9/2/2023 1215  Last data filed at 9/2/2023 1000  Gross per 24 hour   Intake 240 ml   Output 2450 ml   Net -2210 ml       Lines/Drains/Airways       Drain  Duration             Female External Urinary Catheter 08/29/23 1703 3 days              Peripheral Intravenous Line  Duration                  Peripheral IV - Single Lumen 08/27/23 2145 20 G Anterior;Left Forearm 5 days                       Physical Exam  Vitals and nursing note reviewed.   Constitutional:       General: She is not in acute distress.     Appearance: Normal appearance. She is obese. She is not ill-appearing or diaphoretic.   HENT:      Head: Normocephalic and atraumatic.      Nose: Nose normal.      Mouth/Throat:      Mouth: Mucous membranes are moist.      Pharynx: Oropharynx is clear.   Eyes:      Extraocular Movements: Extraocular movements intact.   Cardiovascular:      Rate and Rhythm: Normal rate and regular rhythm.      Pulses: Normal pulses.      Heart sounds: Murmur heard.   Pulmonary:      Effort: Pulmonary effort is normal.      Breath sounds: Normal breath sounds. No wheezing or rales.   Abdominal:      General: Bowel sounds are normal.      Palpations: Abdomen is soft.      Tenderness: There is no abdominal tenderness.      Hernia: A hernia (R hernia abdominal hernia, reducable, non-painful) is present.    Musculoskeletal:         General: Normal range of motion.      Cervical back: Normal range of motion and neck supple.      Right lower leg: No edema.      Left lower leg: No edema.   Skin:     General: Skin is warm and dry.   Neurological:      General: No focal deficit present.      Mental Status: She is alert and oriented to person, place, and time.   Psychiatric:         Mood and Affect: Mood normal.         Behavior: Behavior normal.            Significant Labs:  all labs reviewed    Significant Imaging:  all imaging reviewed    Assessment and Plan:       * Acute on chronic respiratory failure  Patient with Hypercapnic and Hypoxic Respiratory failure which is Chronic.  she is on home oxygen at 4 LPM. Supplemental oxygen was provided and noted-      .   Signs/symptoms of respiratory failure include- tachypnea. Contributing diagnoses includes - CHF and COPD Labs and images were reviewed. Patient Has not had a recent ABG. Will treat underlying causes and adjust management of respiratory failure as follows-     Scheduled inhalers  Continued home benzo  Prn benzo for anxiety    Chronic diastolic congestive heart failure  Patient is identified as having Diastolic (HFpEF) heart failure that is Chronic. CHF is currently controlled. Latest ECHO performed and demonstrates- Results for orders placed during the hospital encounter of 08/25/23    Echo Saline Bubble? No    Interpretation Summary    Left Ventricle: The left ventricle is normal in size. Moderately increased ventricular mass. Moderately increased wall thickness. There is moderate concentrict hypertrophy. Normal wall motion. There is normal systolic function.  EF 61% There is normal diastolic function.    Left Atrium: Left atrium is moderately dilated.    Right Ventricle: Normal right ventricular cavity size. Wall thickness is normal. Right ventricle wall motion  is normal. Systolic function is normal.    Mitral Valve: Mildly thickened anterior leaflet. Mild  mitral annular calcification.    IVC/SVC: Normal venous pressure at 3 mmHg.    Pericardium: There is an effusion.    Limited echo; no gross pulmonary hypertension but poor visualization of tricuspid signal    No tissue Doppler performed to assess mean left atrial pressure  . Continue Beta Blocker, Furosemide and Nitrate/Vasodilator and monitor clinical status closely. Monitor on telemetry. Patient is off CHF pathway.  Monitor strict Is&Os and daily weights.  Place on fluid restriction of 1.5 L. Continue to stress to patient importance of self efficacy and  on diet for CHF. Last BNP reviewed- and noted below   Recent Labs   Lab 08/29/23  0855   *   .    Chronic kidney disease, stage 4 (severe)  Patient with chronic CKD baseline Cr 1.5-1.7.     - avoid nephrotoxic agents or renally dose medications  - trend Cr    Gastroesophageal reflux disease without esophagitis  Continue ppi    DM type 2, controlled, with complication  Last A1c on 8/25 showed 5.0. BG slightly elevated.     - LDSSI  - POCT glucose    Essential hypertension, benign  continue amlodipine    Hypercholesterolemia  Statin held at OSH    - will resume when clinically indicated    Debility  Patient mostly bedbound. Had rehab in the past. Will have PT/OT eval for discharge needs    Unstable angina  78 yoF admitted for ongoing chest pain. Noted to have elevated troponins at OSH but here had troponin of 0.013. patient has some chest pain but only when anxious. Patient has severe multivessel disease. Patient transferred for LHC. Patient underwent LHC which showed severe stenosis of Lcx with iFR 0.74 and  of the mRCA. Patient had BANDAR to Lcx.      - continue DAPT        VTE Risk Mitigation (From admission, onward)         Ordered     enoxaparin injection 40 mg  Every 24 hours         08/30/23 0922     IP VTE HIGH RISK PATIENT  Once         08/29/23 0730     Place sequential compression device  Until discontinued         08/29/23 0730                 Becca Jacobson MD  Cardiology  [facility]

## 2023-09-03 LAB
ALBUMIN SERPL BCP-MCNC: 2.3 G/DL (ref 3.5–5.2)
ALP SERPL-CCNC: 62 U/L (ref 55–135)
ALT SERPL W/O P-5'-P-CCNC: 5 U/L (ref 10–44)
ANION GAP SERPL CALC-SCNC: 7 MMOL/L (ref 8–16)
AST SERPL-CCNC: 12 U/L (ref 10–40)
BASOPHILS # BLD AUTO: 0.03 K/UL (ref 0–0.2)
BASOPHILS NFR BLD: 0.4 % (ref 0–1.9)
BILIRUB SERPL-MCNC: 0.2 MG/DL (ref 0.1–1)
BUN SERPL-MCNC: 48 MG/DL (ref 8–23)
CALCIUM SERPL-MCNC: 8.7 MG/DL (ref 8.7–10.5)
CHLORIDE SERPL-SCNC: 105 MMOL/L (ref 95–110)
CO2 SERPL-SCNC: 33 MMOL/L (ref 23–29)
CREAT SERPL-MCNC: 1.2 MG/DL (ref 0.5–1.4)
DIFFERENTIAL METHOD: ABNORMAL
EOSINOPHIL # BLD AUTO: 0.2 K/UL (ref 0–0.5)
EOSINOPHIL NFR BLD: 1.9 % (ref 0–8)
ERYTHROCYTE [DISTWIDTH] IN BLOOD BY AUTOMATED COUNT: 14.4 % (ref 11.5–14.5)
EST. GFR  (NO RACE VARIABLE): 46.9 ML/MIN/1.73 M^2
GLUCOSE SERPL-MCNC: 91 MG/DL (ref 70–110)
HCT VFR BLD AUTO: 28.2 % (ref 37–48.5)
HGB BLD-MCNC: 8.1 G/DL (ref 12–16)
IMM GRANULOCYTES # BLD AUTO: 0.03 K/UL (ref 0–0.04)
IMM GRANULOCYTES NFR BLD AUTO: 0.4 % (ref 0–0.5)
LYMPHOCYTES # BLD AUTO: 1.5 K/UL (ref 1–4.8)
LYMPHOCYTES NFR BLD: 18.1 % (ref 18–48)
MAGNESIUM SERPL-MCNC: 2 MG/DL (ref 1.6–2.6)
MCH RBC QN AUTO: 27.6 PG (ref 27–31)
MCHC RBC AUTO-ENTMCNC: 28.7 G/DL (ref 32–36)
MCV RBC AUTO: 96 FL (ref 82–98)
MONOCYTES # BLD AUTO: 0.7 K/UL (ref 0.3–1)
MONOCYTES NFR BLD: 8 % (ref 4–15)
NEUTROPHILS # BLD AUTO: 6 K/UL (ref 1.8–7.7)
NEUTROPHILS NFR BLD: 71.2 % (ref 38–73)
NRBC BLD-RTO: 0 /100 WBC
PHOSPHATE SERPL-MCNC: 3.2 MG/DL (ref 2.7–4.5)
PLATELET # BLD AUTO: 173 K/UL (ref 150–450)
PMV BLD AUTO: 12.4 FL (ref 9.2–12.9)
POCT GLUCOSE: 117 MG/DL (ref 70–110)
POCT GLUCOSE: 161 MG/DL (ref 70–110)
POTASSIUM SERPL-SCNC: 4.2 MMOL/L (ref 3.5–5.1)
PROT SERPL-MCNC: 5 G/DL (ref 6–8.4)
RBC # BLD AUTO: 2.93 M/UL (ref 4–5.4)
SODIUM SERPL-SCNC: 145 MMOL/L (ref 136–145)
WBC # BLD AUTO: 8.39 K/UL (ref 3.9–12.7)

## 2023-09-03 PROCEDURE — 99232 PR SUBSEQUENT HOSPITAL CARE,LEVL II: ICD-10-PCS | Mod: GC,,, | Performed by: INTERNAL MEDICINE

## 2023-09-03 PROCEDURE — 25000003 PHARM REV CODE 250

## 2023-09-03 PROCEDURE — 94799 UNLISTED PULMONARY SVC/PX: CPT

## 2023-09-03 PROCEDURE — 83735 ASSAY OF MAGNESIUM: CPT

## 2023-09-03 PROCEDURE — 63600175 PHARM REV CODE 636 W HCPCS

## 2023-09-03 PROCEDURE — 25000242 PHARM REV CODE 250 ALT 637 W/ HCPCS

## 2023-09-03 PROCEDURE — 25000003 PHARM REV CODE 250: Performed by: STUDENT IN AN ORGANIZED HEALTH CARE EDUCATION/TRAINING PROGRAM

## 2023-09-03 PROCEDURE — 25000003 PHARM REV CODE 250: Performed by: INTERNAL MEDICINE

## 2023-09-03 PROCEDURE — 97530 THERAPEUTIC ACTIVITIES: CPT | Mod: CQ

## 2023-09-03 PROCEDURE — 85025 COMPLETE CBC W/AUTO DIFF WBC: CPT

## 2023-09-03 PROCEDURE — 97110 THERAPEUTIC EXERCISES: CPT | Mod: CQ

## 2023-09-03 PROCEDURE — 94660 CPAP INITIATION&MGMT: CPT

## 2023-09-03 PROCEDURE — 20600001 HC STEP DOWN PRIVATE ROOM

## 2023-09-03 PROCEDURE — 36415 COLL VENOUS BLD VENIPUNCTURE: CPT

## 2023-09-03 PROCEDURE — 94640 AIRWAY INHALATION TREATMENT: CPT

## 2023-09-03 PROCEDURE — 84100 ASSAY OF PHOSPHORUS: CPT

## 2023-09-03 PROCEDURE — 80053 COMPREHEN METABOLIC PANEL: CPT

## 2023-09-03 PROCEDURE — 94761 N-INVAS EAR/PLS OXIMETRY MLT: CPT

## 2023-09-03 PROCEDURE — 27100171 HC OXYGEN HIGH FLOW UP TO 24 HOURS

## 2023-09-03 PROCEDURE — 99232 SBSQ HOSP IP/OBS MODERATE 35: CPT | Mod: GC,,, | Performed by: INTERNAL MEDICINE

## 2023-09-03 PROCEDURE — 99900035 HC TECH TIME PER 15 MIN (STAT)

## 2023-09-03 RX ADMIN — IPRATROPIUM BROMIDE AND ALBUTEROL SULFATE 3 ML: 2.5; .5 SOLUTION RESPIRATORY (INHALATION) at 11:09

## 2023-09-03 RX ADMIN — IPRATROPIUM BROMIDE AND ALBUTEROL SULFATE 3 ML: 2.5; .5 SOLUTION RESPIRATORY (INHALATION) at 07:09

## 2023-09-03 RX ADMIN — IPRATROPIUM BROMIDE AND ALBUTEROL SULFATE 3 ML: 2.5; .5 SOLUTION RESPIRATORY (INHALATION) at 04:09

## 2023-09-03 RX ADMIN — ACETAMINOPHEN 650 MG: 325 TABLET ORAL at 11:09

## 2023-09-03 RX ADMIN — IPRATROPIUM BROMIDE AND ALBUTEROL SULFATE 3 ML: 2.5; .5 SOLUTION RESPIRATORY (INHALATION) at 12:09

## 2023-09-03 RX ADMIN — Medication 9 MG: at 11:09

## 2023-09-03 RX ADMIN — ALPRAZOLAM 0.25 MG: 0.25 TABLET ORAL at 07:09

## 2023-09-03 RX ADMIN — CLOPIDOGREL BISULFATE 75 MG: 75 TABLET ORAL at 08:09

## 2023-09-03 RX ADMIN — AMLODIPINE BESYLATE 10 MG: 10 TABLET ORAL at 08:09

## 2023-09-03 RX ADMIN — ASPIRIN 81 MG 81 MG: 81 TABLET ORAL at 08:09

## 2023-09-03 RX ADMIN — Medication 6 MG: at 08:09

## 2023-09-03 RX ADMIN — IPRATROPIUM BROMIDE AND ALBUTEROL SULFATE 3 ML: 2.5; .5 SOLUTION RESPIRATORY (INHALATION) at 03:09

## 2023-09-03 RX ADMIN — ENOXAPARIN SODIUM 40 MG: 40 INJECTION SUBCUTANEOUS at 05:09

## 2023-09-03 RX ADMIN — FUROSEMIDE 20 MG: 20 TABLET ORAL at 08:09

## 2023-09-03 RX ADMIN — METOPROLOL SUCCINATE 200 MG: 100 TABLET, EXTENDED RELEASE ORAL at 08:09

## 2023-09-03 NOTE — PT/OT/SLP PROGRESS
"Physical Therapy Treatment    Patient Name:  Marisol Kerr   MRN:  [id number]    Recommendations:     Discharge Recommendations: nursing facility, skilled  Discharge Equipment Recommendations: none  Barriers to discharge: None    Assessment:     Marisol Kerr is a 78 y.o. female admitted with a medical diagnosis of Acute on chronic respiratory failure.  She presents with the following impairments/functional limitations: weakness, impaired endurance, impaired self care skills, gait instability, impaired skin, impaired cardiopulmonary response to activity.    Rehab Prognosis: Good; patient would benefit from acute skilled PT services to address these deficits and reach maximum level of function.    Recent Surgery: Procedure(s) (LRB):  Stent, Drug Eluting, Single Vessel, Coronary (N/A)  Instantaneous Wave-Free Ratio (IFR) (N/A)  ANGIOGRAM, CORONARY ARTERY (N/A) 5 Days Post-Op    Plan:     During this hospitalization, patient to be seen 3 x/week to address the identified rehab impairments via therapeutic activities, gait training, therapeutic exercises, neuromuscular re-education and progress toward the following goals:    Plan of Care Expires:  09/30/23    Subjective     Chief Complaint: "my feet are burning, so I don't want to stand"  Patient/Family Comments/goals: get better  Pain/Comfort:  Pain Rating 1: 0/10      Objective:     Communicated with RN prior to session.  Patient found right sidelying with PureWick, oxygen, peripheral IV upon PT entry to room.     General Precautions: Standard, fall  Orthopedic Precautions: N/A  Braces: N/A  Respiratory Status: nasal cannula     Functional Mobility:  Bed Mobility:     Rolling Left:  contact guard assistance  Rolling Right: contact guard assistance  Scooting: contact guard assistance  Bridging: minimum assistance  Supine to Sit: minimum assistance  Sit to Supine: moderate assistance  Transfers:     Sit to Stand:  contact guard assistance with hand-held assist      AM-PAC 6 " CLICK MOBILITY  Turning over in bed (including adjusting bedclothes, sheets and blankets)?: 3  Sitting down on and standing up from a chair with arms (e.g., wheelchair, bedside commode, etc.): 3  Moving from lying on back to sitting on the side of the bed?: 3  Moving to and from a bed to a chair (including a wheelchair)?: 3  Need to walk in hospital room?: 3  Climbing 3-5 steps with a railing?: 2  Basic Mobility Total Score: 17       Treatment & Education:  Pt sat EOB to perform AP, LAQ, hip flexion x10 each   Pt found soiled, stood by bedside in order to change absorbant pad. Pt took 3 side to steps to HOB utilizing HHA and bed rail for steadying.   Throughout treatment O2 sat ranging 88-92%. Pt educated on therapeutic breathing techniques to increase oxygenation. Therapeutic rest breaks to allow O2 sat to increase 91-92%.    Patient left HOB elevated with all lines intact and call button in reach..    GOALS:   Multidisciplinary Problems       Physical Therapy Goals          Problem: Physical Therapy    Goal Priority Disciplines Outcome Goal Variances Interventions   Physical Therapy Goal     PT, PT/OT Ongoing, Progressing     Description: Goals to be met by: 23     Patient will increase functional independence with mobility by performin. Supine to sit with Supervision  2. Sit to stand transfer with Supervision  3. Bed to chair transfer with Supervision using LRAD  4. Gait  x 75 feet with Supervision using LRAD.   5. Ascend/descend 1 stair with right Handrails Contact Guard Assistance using LRAD  6. Lower extremity exercise program x30 reps per handout, with independence                         Time Tracking:     PT Received On: 23  PT Start Time: 914     PT Stop Time: 952  PT Total Time (min): 38 min     Billable Minutes: Therapeutic Activity 23 and Therapeutic Exercise 15    Treatment Type: Treatment  PT/PTA: PTA     Number of PTA visits since last PT visit: 2023

## 2023-09-03 NOTE — NURSING
0715: Pt in bed with bipap in place, oriented x4, denies needs. CB in reach, full assessment complete    1200: Pt assisted up to chair, tolerated well with standby assist    1544: RT at bedside per patient request to given PRN breathing treatment. RT placing patient back on BIPAP as well- pt noted to be using accessory muscles and more labored, o2 sats 96%  prior to treatment. Pt continues to talk and therefore does not attempt breathing technique as instructed.     1800: Pt in bed with no needs voiced at this time. Denies pain. Watching tv. Resp even and nonlabored on nasal cannula.

## 2023-09-03 NOTE — SUBJECTIVE & OBJECTIVE
Interval History: patient stepped down from ICU. NAEON and VSS.     Objective:     Vital Signs (Most Recent):  Temp: 98.6 °F (37 °C) (09/03/23 0715)  Pulse: 60 (09/03/23 0738)  Resp: 18 (09/03/23 0738)  BP: (!) 130/58 (09/03/23 0715)  SpO2: 97 % (09/03/23 0738) Vital Signs (24h Range):  Temp:  [97 °F (36.1 °C)-98.6 °F (37 °C)] 98.6 °F (37 °C)  Pulse:  [54-81] 60  Resp:  [18-34] 18  SpO2:  [94 %-100 %] 97 %  BP: (116-184)/(53-72) 130/58     Weight: 69 kg (152 lb 1.9 oz)  Body mass index is 26.95 kg/m².    Intake/Output Summary (Last 24 hours) at 9/3/2023 0750  Last data filed at 9/2/2023 1449  Gross per 24 hour   Intake 462 ml   Output 950 ml   Net -488 ml     Physical Exam  Vitals reviewed.   Constitutional:       General: She is not in acute distress.     Appearance: She is obese. She is not diaphoretic.   HENT:      Head: Normocephalic and atraumatic.      Mouth/Throat:      Mouth: Mucous membranes are moist.   Eyes:      Extraocular Movements: Extraocular movements intact.   Cardiovascular:      Rate and Rhythm: Normal rate and regular rhythm.      Pulses: Normal pulses.      Heart sounds: Murmur heard.   Pulmonary:      Effort: Pulmonary effort is normal.      Breath sounds: Normal breath sounds. No wheezing or rales.   Abdominal:      General: Bowel sounds are normal.      Palpations: Abdomen is soft.      Tenderness: There is no abdominal tenderness.      Hernia: A hernia (R hernia abdominal hernia, reducable, non-painful) is present.   Musculoskeletal:      Cervical back: Normal range of motion.      Right lower leg: No edema.      Left lower leg: No edema.   Skin:     General: Skin is warm and dry.   Neurological:      General: No focal deficit present.      Mental Status: She is alert and oriented to person, place, and time.   Psychiatric:         Mood and Affect: Mood normal.         Behavior: Behavior normal.       Significant Labs:  all labs reviewed    Significant Imaging:  all imaging reviewed

## 2023-09-03 NOTE — PROGRESS NOTES
[facility]  Cardiology Progress Note    Patient Name: Marisol Kerr  MRN: [id number]  Admission Date: 8/29/2023  Hospital Length of Stay: 5 days  Code Status: Full Code   Attending Physician: Neal Llanes MD   Primary Care Physician: Khadar Dwyer MD  Expected Discharge Date: 9/5/2023  Principal Problem:Acute on chronic respiratory failure    Subjective:     Hospital Course:   Patient admitted to CCU for further management. Patient underwent LHC which showed severe stenosis of Lcx with iFR 0.74 and  of the mRCA. Patient had BANDAR to Lcx. PT/OT recommending SNF. Patient became anxious, VBG showed hypercapnia. Bipap ordered and patient transferred to ICU but patient never obtained Bipap. SOB and anxiety improved and patient was stepped back down. Patient pending SNF placement.    Interval History: patient stepped down from ICU. NAEON and VSS.     Objective:     Vital Signs (Most Recent):  Temp: 98.6 °F (37 °C) (09/03/23 0715)  Pulse: 60 (09/03/23 0738)  Resp: 18 (09/03/23 0738)  BP: (!) 130/58 (09/03/23 0715)  SpO2: 97 % (09/03/23 0738) Vital Signs (24h Range):  Temp:  [97 °F (36.1 °C)-98.6 °F (37 °C)] 98.6 °F (37 °C)  Pulse:  [54-81] 60  Resp:  [18-34] 18  SpO2:  [94 %-100 %] 97 %  BP: (116-184)/(53-72) 130/58     Weight: 69 kg (152 lb 1.9 oz)  Body mass index is 26.95 kg/m².    Intake/Output Summary (Last 24 hours) at 9/3/2023 0750  Last data filed at 9/2/2023 1449  Gross per 24 hour   Intake 462 ml   Output 950 ml   Net -488 ml     Physical Exam  Vitals reviewed.   Constitutional:       General: She is not in acute distress.     Appearance: She is obese. She is not diaphoretic.   HENT:      Head: Normocephalic and atraumatic.      Mouth/Throat:      Mouth: Mucous membranes are moist.   Eyes:      Extraocular Movements: Extraocular movements intact.   Cardiovascular:      Rate and Rhythm: Normal rate and regular rhythm.      Pulses: Normal pulses.      Heart sounds: Murmur heard.    Pulmonary:      Effort: Pulmonary effort is normal.      Breath sounds: Normal breath sounds. No wheezing or rales.   Abdominal:      General: Bowel sounds are normal.      Palpations: Abdomen is soft.      Tenderness: There is no abdominal tenderness.      Hernia: A hernia (R hernia abdominal hernia, reducable, non-painful) is present.   Musculoskeletal:      Cervical back: Normal range of motion.      Right lower leg: No edema.      Left lower leg: No edema.   Skin:     General: Skin is warm and dry.   Neurological:      General: No focal deficit present.      Mental Status: She is alert and oriented to person, place, and time.   Psychiatric:         Mood and Affect: Mood normal.         Behavior: Behavior normal.       Significant Labs:  all labs reviewed    Significant Imaging:  all imaging reviewed    Assessment and Plan:     * Acute on chronic respiratory failure  Patient with Hypercapnic and Hypoxic Respiratory failure which is Chronic.  she is on home oxygen at 4 LPM. Supplemental oxygen was provided and noted-      .   Signs/symptoms of respiratory failure include- tachypnea. Contributing diagnoses includes - CHF and COPD Labs and images were reviewed. Patient Has not had a recent ABG. Will treat underlying causes and adjust management of respiratory failure as follows-     Scheduled inhalers  Continued home benzo  Prn benzo for anxiety    Debility  Patient mostly bedbound. Had rehab in the past. Will have PT/OT eval for discharge needs    Unstable angina  78 yoF admitted for ongoing chest pain. Noted to have elevated troponins at OSH but here had troponin of 0.013. patient has some chest pain but only when anxious. Patient has severe multivessel disease. Patient transferred for LHC. Patient underwent LHC which showed severe stenosis of Lcx with iFR 0.74 and  of the mRCA. Patient had BANDAR to Lcx.      - continue DAPT    Chronic diastolic congestive heart failure  Patient is identified as having  Diastolic (HFpEF) heart failure that is Chronic. CHF is currently controlled. Latest ECHO performed and demonstrates- Results for orders placed during the hospital encounter of 08/25/23    Echo Saline Bubble? No    Interpretation Summary    Left Ventricle: The left ventricle is normal in size. Moderately increased ventricular mass. Moderately increased wall thickness. There is moderate concentrict hypertrophy. Normal wall motion. There is normal systolic function.  EF 61% There is normal diastolic function.    Left Atrium: Left atrium is moderately dilated.    Right Ventricle: Normal right ventricular cavity size. Wall thickness is normal. Right ventricle wall motion  is normal. Systolic function is normal.    Mitral Valve: Mildly thickened anterior leaflet. Mild mitral annular calcification.    IVC/SVC: Normal venous pressure at 3 mmHg.    Pericardium: There is an effusion.    Limited echo; no gross pulmonary hypertension but poor visualization of tricuspid signal    No tissue Doppler performed to assess mean left atrial pressure  . Continue Beta Blocker, Furosemide and Nitrate/Vasodilator and monitor clinical status closely. Monitor on telemetry. Patient is off CHF pathway.  Monitor strict Is&Os and daily weights.  Place on fluid restriction of 1.5 L. Continue to stress to patient importance of self efficacy and  on diet for CHF. Last BNP reviewed- and noted below   Recent Labs   Lab 08/29/23  0855   *   .    Chronic kidney disease, stage 4 (severe)  Patient with chronic CKD baseline Cr 1.5-1.7.     - avoid nephrotoxic agents or renally dose medications  - trend Cr    Gastroesophageal reflux disease without esophagitis  Continue ppi    DM type 2, controlled, with complication  Last A1c on 8/25 showed 5.0. BG slightly elevated.     - LDSSI  - POCT glucose    Essential hypertension, benign  continue amlodipine    Hypercholesterolemia  Statin held at OSH    - will resume when clinically  indicated      VTE Risk Mitigation (From admission, onward)           Ordered     enoxaparin injection 40 mg  Every 24 hours         08/30/23 0922     IP VTE HIGH RISK PATIENT  Once         08/29/23 0730     Place sequential compression device  Until discontinued         08/29/23 0730                    Mik Melendez MD  Cardiology  [facility]

## 2023-09-03 NOTE — PROGRESS NOTES
Respiratory called to bedside, RN wanted to see if pt can get a breathing tx due to change in status with her breathing. Pt was noted to be mildly labored. Pt placed on BIPAP 12/5 40%, breathing in process. Will assess pt again.

## 2023-09-04 LAB
ALBUMIN SERPL BCP-MCNC: 2.3 G/DL (ref 3.5–5.2)
ALP SERPL-CCNC: 65 U/L (ref 55–135)
ALT SERPL W/O P-5'-P-CCNC: 8 U/L (ref 10–44)
ANION GAP SERPL CALC-SCNC: 9 MMOL/L (ref 8–16)
AST SERPL-CCNC: 12 U/L (ref 10–40)
BASOPHILS # BLD AUTO: 0.02 K/UL (ref 0–0.2)
BASOPHILS NFR BLD: 0.3 % (ref 0–1.9)
BILIRUB SERPL-MCNC: 0.2 MG/DL (ref 0.1–1)
BUN SERPL-MCNC: 52 MG/DL (ref 8–23)
CALCIUM SERPL-MCNC: 8.6 MG/DL (ref 8.7–10.5)
CHLORIDE SERPL-SCNC: 104 MMOL/L (ref 95–110)
CO2 SERPL-SCNC: 32 MMOL/L (ref 23–29)
CREAT SERPL-MCNC: 1.4 MG/DL (ref 0.5–1.4)
DIFFERENTIAL METHOD: ABNORMAL
EOSINOPHIL # BLD AUTO: 0.2 K/UL (ref 0–0.5)
EOSINOPHIL NFR BLD: 2.3 % (ref 0–8)
ERYTHROCYTE [DISTWIDTH] IN BLOOD BY AUTOMATED COUNT: 14.9 % (ref 11.5–14.5)
EST. GFR  (NO RACE VARIABLE): 39 ML/MIN/1.73 M^2
GLUCOSE SERPL-MCNC: 86 MG/DL (ref 70–110)
HCT VFR BLD AUTO: 26.7 % (ref 37–48.5)
HGB BLD-MCNC: 7.7 G/DL (ref 12–16)
IMM GRANULOCYTES # BLD AUTO: 0.03 K/UL (ref 0–0.04)
IMM GRANULOCYTES NFR BLD AUTO: 0.4 % (ref 0–0.5)
LYMPHOCYTES # BLD AUTO: 2.1 K/UL (ref 1–4.8)
LYMPHOCYTES NFR BLD: 27.8 % (ref 18–48)
MAGNESIUM SERPL-MCNC: 1.9 MG/DL (ref 1.6–2.6)
MCH RBC QN AUTO: 27.3 PG (ref 27–31)
MCHC RBC AUTO-ENTMCNC: 28.8 G/DL (ref 32–36)
MCV RBC AUTO: 95 FL (ref 82–98)
MONOCYTES # BLD AUTO: 0.6 K/UL (ref 0.3–1)
MONOCYTES NFR BLD: 8.1 % (ref 4–15)
NEUTROPHILS # BLD AUTO: 4.5 K/UL (ref 1.8–7.7)
NEUTROPHILS NFR BLD: 61.1 % (ref 38–73)
NRBC BLD-RTO: 0 /100 WBC
PHOSPHATE SERPL-MCNC: 3 MG/DL (ref 2.7–4.5)
PLATELET # BLD AUTO: 175 K/UL (ref 150–450)
PMV BLD AUTO: 12.7 FL (ref 9.2–12.9)
POCT GLUCOSE: 122 MG/DL (ref 70–110)
POCT GLUCOSE: 154 MG/DL (ref 70–110)
POCT GLUCOSE: 168 MG/DL (ref 70–110)
POCT GLUCOSE: 92 MG/DL (ref 70–110)
POTASSIUM SERPL-SCNC: 4.2 MMOL/L (ref 3.5–5.1)
PROT SERPL-MCNC: 5.1 G/DL (ref 6–8.4)
RBC # BLD AUTO: 2.82 M/UL (ref 4–5.4)
SODIUM SERPL-SCNC: 145 MMOL/L (ref 136–145)
WBC # BLD AUTO: 7.38 K/UL (ref 3.9–12.7)

## 2023-09-04 PROCEDURE — 80053 COMPREHEN METABOLIC PANEL: CPT

## 2023-09-04 PROCEDURE — 99900035 HC TECH TIME PER 15 MIN (STAT)

## 2023-09-04 PROCEDURE — 99231 SBSQ HOSP IP/OBS SF/LOW 25: CPT | Mod: GC,,, | Performed by: INTERNAL MEDICINE

## 2023-09-04 PROCEDURE — 97110 THERAPEUTIC EXERCISES: CPT

## 2023-09-04 PROCEDURE — 20600001 HC STEP DOWN PRIVATE ROOM

## 2023-09-04 PROCEDURE — 63600175 PHARM REV CODE 636 W HCPCS

## 2023-09-04 PROCEDURE — 83735 ASSAY OF MAGNESIUM: CPT

## 2023-09-04 PROCEDURE — 25000003 PHARM REV CODE 250

## 2023-09-04 PROCEDURE — 25000003 PHARM REV CODE 250: Performed by: STUDENT IN AN ORGANIZED HEALTH CARE EDUCATION/TRAINING PROGRAM

## 2023-09-04 PROCEDURE — 87449 NOS EACH ORGANISM AG IA: CPT | Performed by: INTERNAL MEDICINE

## 2023-09-04 PROCEDURE — 99231 PR SUBSEQUENT HOSPITAL CARE,LEVL I: ICD-10-PCS | Mod: GC,,, | Performed by: INTERNAL MEDICINE

## 2023-09-04 PROCEDURE — 94799 UNLISTED PULMONARY SVC/PX: CPT

## 2023-09-04 PROCEDURE — 87493 C DIFF AMPLIFIED PROBE: CPT | Performed by: INTERNAL MEDICINE

## 2023-09-04 PROCEDURE — 94640 AIRWAY INHALATION TREATMENT: CPT

## 2023-09-04 PROCEDURE — 36415 COLL VENOUS BLD VENIPUNCTURE: CPT

## 2023-09-04 PROCEDURE — 25000242 PHARM REV CODE 250 ALT 637 W/ HCPCS

## 2023-09-04 PROCEDURE — 94660 CPAP INITIATION&MGMT: CPT

## 2023-09-04 PROCEDURE — 85025 COMPLETE CBC W/AUTO DIFF WBC: CPT

## 2023-09-04 PROCEDURE — 94761 N-INVAS EAR/PLS OXIMETRY MLT: CPT

## 2023-09-04 PROCEDURE — 99900031 HC PATIENT EDUCATION (STAT)

## 2023-09-04 PROCEDURE — 27100171 HC OXYGEN HIGH FLOW UP TO 24 HOURS

## 2023-09-04 PROCEDURE — 27000207 HC ISOLATION

## 2023-09-04 PROCEDURE — 84100 ASSAY OF PHOSPHORUS: CPT

## 2023-09-04 RX ORDER — AMLODIPINE BESYLATE 5 MG/1
5 TABLET ORAL DAILY
Status: DISCONTINUED | OUTPATIENT
Start: 2023-09-04 | End: 2023-09-05 | Stop reason: HOSPADM

## 2023-09-04 RX ORDER — PANTOPRAZOLE SODIUM 40 MG/1
40 TABLET, DELAYED RELEASE ORAL DAILY
Status: DISCONTINUED | OUTPATIENT
Start: 2023-09-04 | End: 2023-09-05 | Stop reason: HOSPADM

## 2023-09-04 RX ORDER — LISINOPRIL 10 MG/1
10 TABLET ORAL DAILY
Status: DISCONTINUED | OUTPATIENT
Start: 2023-09-04 | End: 2023-09-05 | Stop reason: HOSPADM

## 2023-09-04 RX ADMIN — ALPRAZOLAM 0.25 MG: 0.25 TABLET ORAL at 08:09

## 2023-09-04 RX ADMIN — ASPIRIN 81 MG 81 MG: 81 TABLET ORAL at 08:09

## 2023-09-04 RX ADMIN — IPRATROPIUM BROMIDE AND ALBUTEROL SULFATE 3 ML: .5; 3 SOLUTION RESPIRATORY (INHALATION) at 08:09

## 2023-09-04 RX ADMIN — Medication 6 MG: at 08:09

## 2023-09-04 RX ADMIN — IPRATROPIUM BROMIDE AND ALBUTEROL SULFATE 3 ML: 2.5; .5 SOLUTION RESPIRATORY (INHALATION) at 12:09

## 2023-09-04 RX ADMIN — LISINOPRIL 10 MG: 10 TABLET ORAL at 08:09

## 2023-09-04 RX ADMIN — IPRATROPIUM BROMIDE AND ALBUTEROL SULFATE 3 ML: 2.5; .5 SOLUTION RESPIRATORY (INHALATION) at 08:09

## 2023-09-04 RX ADMIN — IPRATROPIUM BROMIDE AND ALBUTEROL SULFATE 3 ML: 2.5; .5 SOLUTION RESPIRATORY (INHALATION) at 04:09

## 2023-09-04 RX ADMIN — FUROSEMIDE 20 MG: 20 TABLET ORAL at 08:09

## 2023-09-04 RX ADMIN — CLOPIDOGREL BISULFATE 75 MG: 75 TABLET ORAL at 08:09

## 2023-09-04 RX ADMIN — IPRATROPIUM BROMIDE AND ALBUTEROL SULFATE 3 ML: 2.5; .5 SOLUTION RESPIRATORY (INHALATION) at 03:09

## 2023-09-04 RX ADMIN — ONDANSETRON 8 MG: 4 TABLET, ORALLY DISINTEGRATING ORAL at 04:09

## 2023-09-04 RX ADMIN — AMLODIPINE BESYLATE 5 MG: 5 TABLET ORAL at 08:09

## 2023-09-04 RX ADMIN — PANTOPRAZOLE SODIUM 40 MG: 40 TABLET, DELAYED RELEASE ORAL at 08:09

## 2023-09-04 RX ADMIN — METOPROLOL SUCCINATE 200 MG: 100 TABLET, EXTENDED RELEASE ORAL at 08:09

## 2023-09-04 RX ADMIN — ENOXAPARIN SODIUM 40 MG: 40 INJECTION SUBCUTANEOUS at 05:09

## 2023-09-04 NOTE — PLAN OF CARE
[facility]  Discharge Reassessment    Primary Care Provider: Khadar Dwyer MD    Expected Discharge Date: 9/5/2023    Reassessment (most recent)       Discharge Reassessment - 09/04/23 1617          Discharge Reassessment    Assessment Type Discharge Planning Reassessment (P)      Did the patient's condition or plan change since previous assessment? Yes (P)      Discharge Plan discussed with: Adult children (P)      Communicated LUZ with patient/caregiver Yes (P)      Discharge Plan A Skilled Nursing Facility (P)      Discharge Plan B Home Health (P)      Why the patient remains in the hospital -- (P)         Post-Acute Status    Post-Acute Authorization Placement (P)      Post-Acute Placement Status Referrals Sent (P)      Coverage Medicare Part A & B (P)      Discharge Delays Post-Acute Set-up (P)                                  OLIVER An, LMSW  [facility]  Case Management  Ext. [phone]

## 2023-09-04 NOTE — PROGRESS NOTES
[facility] Stepdown  Cardiology  Progress Note    Patient Name: Marisol Kerr  MRN: [id number]  Admission Date: 8/29/2023  Hospital Length of Stay: 6 days  Code Status: Full Code   Attending Physician: Neal Llanes MD   Primary Care Physician: Khadar Dwyer MD  Expected Discharge Date: 9/5/2023  Principal Problem:Acute on chronic respiratory failure    Subjective:     Hospital Course:   Patient admitted to CCU for further management. Patient underwent LHC which showed severe stenosis of Lcx with iFR 0.74 and  of the mRCA. Patient had BANDAR to Lcx. PT/OT recommending SNF. Patient became anxious, VBG showed hypercapnia. Bipap ordered and patient transferred to ICU but patient never obtained Bipap. SOB and anxiety improved and patient was stepped back down. Patient pending SNF placement.      Interval History: No acute events overnight, afebrile, hemodynamically stable.      Review of Systems   Constitutional: Negative for chills and fever.   HENT:  Negative for congestion.    Cardiovascular:  Negative for chest pain and dyspnea on exertion.   Respiratory:  Negative for cough and shortness of breath.    Musculoskeletal:  Negative for back pain.   Gastrointestinal:  Negative for abdominal pain, diarrhea, nausea and vomiting.   Neurological:  Negative for headaches.     Objective:     Vital Signs (Most Recent):  Temp: 97.9 °F (36.6 °C) (09/04/23 0751)  Pulse: 61 (09/04/23 0751)  Resp: 20 (09/04/23 0751)  BP: 139/71 (09/04/23 0751)  SpO2: 98 % (09/04/23 0751) Vital Signs (24h Range):  Temp:  [97.2 °F (36.2 °C)-99.5 °F (37.5 °C)] 97.9 °F (36.6 °C)  Pulse:  [56-78] 61  Resp:  [18-26] 20  SpO2:  [93 %-100 %] 98 %  BP: (127-139)/(55-71) 139/71     Weight: 73.7 kg (162 lb 7.7 oz)  Body mass index is 28.78 kg/m².     SpO2: 98 %         Intake/Output Summary (Last 24 hours) at 9/4/2023 0868  Last data filed at 9/4/2023 0110  Gross per 24 hour   Intake 222 ml   Output 1100 ml   Net -878 ml        Lines/Drains/Airways       Drain  Duration             Female External Urinary Catheter 08/29/23 1703 5 days              Peripheral Intravenous Line  Duration                  Peripheral IV - Single Lumen 08/27/23 2145 20 G Anterior;Left Forearm 7 days                       Physical Exam  Vitals and nursing note reviewed.   Constitutional:       General: She is not in acute distress.     Appearance: Normal appearance. She is obese. She is not ill-appearing or diaphoretic.   HENT:      Head: Normocephalic and atraumatic.      Nose: Nose normal.      Mouth/Throat:      Mouth: Mucous membranes are moist.      Pharynx: Oropharynx is clear.   Eyes:      Extraocular Movements: Extraocular movements intact.   Cardiovascular:      Rate and Rhythm: Normal rate and regular rhythm.      Pulses: Normal pulses.      Heart sounds: Murmur heard.   Pulmonary:      Effort: Pulmonary effort is normal.      Breath sounds: Normal breath sounds. No wheezing or rales.   Abdominal:      General: Bowel sounds are normal.      Palpations: Abdomen is soft.      Tenderness: There is no abdominal tenderness.      Hernia: A hernia (R hernia abdominal hernia, reducable, non-painful) is present.   Musculoskeletal:         General: Normal range of motion.      Cervical back: Normal range of motion and neck supple.      Right lower leg: No edema.      Left lower leg: No edema.   Skin:     General: Skin is warm and dry.   Neurological:      General: No focal deficit present.      Mental Status: She is alert and oriented to person, place, and time.   Psychiatric:         Mood and Affect: Mood normal.         Behavior: Behavior normal.            Significant Labs: CMP   Recent Labs   Lab 09/02/23  1159 09/03/23  0522 09/04/23  0448   NA  --  145 145   K 4.3 4.2 4.2   CL  --  105 104   CO2  --  33* 32*   GLU  --  91 86   BUN  --  48* 52*   CREATININE  --  1.2 1.4   CALCIUM  --  8.7 8.6*   PROT  --  5.0* 5.1*   ALBUMIN  --  2.3* 2.3*   BILITOT   --  0.2 0.2   ALKPHOS  --  62 65   AST  --  12 12   ALT  --  5* 8*   ANIONGAP  --  7* 9    and CBC   Recent Labs   Lab 09/03/23  0522 09/04/23  0448   WBC 8.39 7.38   HGB 8.1* 7.7*   HCT 28.2* 26.7*    175       Significant Imaging:       Assessment and Plan:       * Acute on chronic respiratory failure  Patient with Hypercapnic and Hypoxic Respiratory failure which is Chronic.  she is on home oxygen at 4 LPM. Supplemental oxygen was provided and noted-      .   Signs/symptoms of respiratory failure include- tachypnea. Contributing diagnoses includes - CHF and COPD Labs and images were reviewed. Patient Has not had a recent ABG. Will treat underlying causes and adjust management of respiratory failure as follows-     Scheduled inhalers  Continued home benzo  Prn benzo for anxiety    Debility  Patient mostly bedbound. Had rehab in the past. Will have PT/OT eval for discharge needs    Unstable angina  78 yoF admitted for ongoing chest pain. Noted to have elevated troponins at OSH but here had troponin of 0.013. patient has some chest pain but only when anxious. Patient has severe multivessel disease. Patient transferred for LHC. Patient underwent LHC which showed severe stenosis of Lcx with iFR 0.74 and  of the mRCA. Patient had BANDAR to Lcx.      - continue DAPT    Chronic diastolic congestive heart failure  Patient is identified as having Diastolic (HFpEF) heart failure that is Chronic. CHF is currently controlled. Latest ECHO performed and demonstrates- Results for orders placed during the hospital encounter of 08/25/23    Echo Saline Bubble? No    Interpretation Summary    Left Ventricle: The left ventricle is normal in size. Moderately increased ventricular mass. Moderately increased wall thickness. There is moderate concentrict hypertrophy. Normal wall motion. There is normal systolic function.  EF 61% There is normal diastolic function.    Left Atrium: Left atrium is moderately dilated.     Right Ventricle: Normal right ventricular cavity size. Wall thickness is normal. Right ventricle wall motion  is normal. Systolic function is normal.    Mitral Valve: Mildly thickened anterior leaflet. Mild mitral annular calcification.    IVC/SVC: Normal venous pressure at 3 mmHg.    Pericardium: There is an effusion.    Limited echo; no gross pulmonary hypertension but poor visualization of tricuspid signal    No tissue Doppler performed to assess mean left atrial pressure  . Continue Beta Blocker, Furosemide and Nitrate/Vasodilator and monitor clinical status closely. Monitor on telemetry. Patient is off CHF pathway.  Monitor strict Is&Os and daily weights.  Place on fluid restriction of 1.5 L. Continue to stress to patient importance of self efficacy and  on diet for CHF. Last BNP reviewed- and noted below   Recent Labs   Lab 08/29/23  0855   *   .    Chronic kidney disease, stage 4 (severe)  Patient with chronic CKD baseline Cr 1.5-1.7.     - avoid nephrotoxic agents or renally dose medications  - trend Cr    Gastroesophageal reflux disease without esophagitis  Continue ppi    DM type 2, controlled, with complication  Last A1c on 8/25 showed 5.0. BG slightly elevated.     - LDSSI  - POCT glucose    Essential hypertension, benign  continue amlodipine    Hypercholesterolemia  Statin held at OSH    Plan  - will resume when clinically indicated        VTE Risk Mitigation (From admission, onward)         Ordered     enoxaparin injection 40 mg  Every 24 hours         08/30/23 0922     IP VTE HIGH RISK PATIENT  Once         08/29/23 0730     Place sequential compression device  Until discontinued         08/29/23 0730                Farhan Berry DO  Cardiology  [facility]

## 2023-09-04 NOTE — PT/OT/SLP PROGRESS
Occupational Therapy   Treatment    Name: Marisol Kerr  MRN: [id number]  Admitting Diagnosis:  Acute on chronic respiratory failure  6 Days Post-Op    Recommendations:     Discharge Recommendations: nursing facility, skilled  Discharge Equipment Recommendations:  none  Barriers to discharge:  Decreased caregiver support    Assessment:     Marisol Kerr is a 78 y.o. female with a medical diagnosis of Acute on chronic respiratory failure.  She presents with bed>chair transfer with CGA but patient reporting too fatigued to complete functional mobility. Patient tolerated therapeutic exercises well with SpO2>92% throughout session (4L). Performance deficits affecting function are weakness, impaired endurance, impaired self care skills, impaired functional mobility, gait instability, impaired cardiopulmonary response to activity.     Rehab Prognosis:  Good; patient would benefit from acute skilled OT services to address these deficits and reach maximum level of function.       Plan:     Patient to be seen 3 x/week to address the above listed problems via self-care/home management, therapeutic activities, therapeutic exercises, neuromuscular re-education  Plan of Care Expires: 09/29/23  Plan of Care Reviewed with: patient    Subjective     Chief Complaint: too fatigued to ambulate  Patient/Family Comments/goals: get better  Pain/Comfort:  Pain Rating 1: 0/10  Pain Rating Post-Intervention 1: 0/10    Objective:     Communicated with: nursing prior to session.  Patient found HOB elevated with PureWick, pulse ox (continuous), telemetry, oxygen upon OT entry to room.    General Precautions: Standard, fall    Orthopedic Precautions:N/A  Braces: N/A  Respiratory Status: Nasal cannula, flow 4 L/min     Occupational Performance:     Bed Mobility:    Patient completed Scooting/Bridging with contact guard assistance  Patient completed Supine to Sit with minimum assistance and with side rail     Functional Mobility/Transfers:  Patient  completed Bed <> Chair Transfer using Stand Pivot technique with contact guard assistance with no assistive device    Activities of Daily Living:  Grooming: maximal assistance to comb hair due to patient reporting decreased ROM      [facility] 6 Click ADL: 19    Treatment & Education:  Patient completed 2x10 shoulder flexion and 2x15 arm punches while supported sitting in bedside chair    Therapeutic exercise facilitated by edge of bed activities, postural control, and participation in functional standing tasks and transfers to improve activity tolerance. Patient maintained no visual signs of impaired cardiopulmonary responses with no SOB or reported dizziness.      Patient educated on:   -purpose of OT and OT POC  -facilitation and education on proper body mechanics, energy conservation, and safety  -importance of early mobility and out of bed activities with staff assist  -overall benefits of therapy     All questions answered within OT scope and to patient's satisfaction    Patient left up in chair with all lines intact, call button in reach, and nursing notified    GOALS:   Multidisciplinary Problems       Occupational Therapy Goals          Problem: Occupational Therapy    Goal Priority Disciplines Outcome Interventions   Occupational Therapy Goal     OT, PT/OT Ongoing, Progressing    Description: Goals to be met by: 9/6/23    Patient will increase functional independence with ADLs by performing:    Grooming while standing at sink with Supervision.  Toileting from toilet with Supervision for hygiene and clothing management.   Supine to sit with Modified [place].  Toilet transfer to toilet with Supervision.                         Time Tracking:     OT Date of Treatment: 09/04/23  OT Start Time: 0947  OT Stop Time: 1007  OT Total Time (min): 20 min    Billable Minutes:Therapeutic Exercise 20    OT/LONA: OT          9/4/2023

## 2023-09-04 NOTE — PLAN OF CARE
ADRIANA phoned [facility] extended Care to f/u on status of patient's SNF referral. ADRIANA unable to reach admissions at that time (possibly due to holiday). ADRIANA left weekday SW's contact information via voicemail for f/u.       ADRIANA phoned patient's daughter for DP reassessment. Per daughter, SNF remains the plan for dc. Daughter states that patient unable to dc home due to current care needs.            Eyal Casillas, OLIVER, LMSW  [facility]  Case Management  Ext. [phone]

## 2023-09-04 NOTE — NURSING
1020: Pt up in chair with no distress noted. CB in reach    1254: MD team updated on patient's respiratory status having occasional episodes requesting PRN breathing treatments and wearing BIPAP yesterday during day shift and throughout night. No response or orders received    1400: Pt assisted back to bed

## 2023-09-04 NOTE — SUBJECTIVE & OBJECTIVE
Interval History: No acute events overnight, afebrile, hemodynamically stable.      Review of Systems   Constitutional: Negative for chills and fever.   HENT:  Negative for congestion.    Cardiovascular:  Negative for chest pain and dyspnea on exertion.   Respiratory:  Negative for cough and shortness of breath.    Musculoskeletal:  Negative for back pain.   Gastrointestinal:  Negative for abdominal pain, diarrhea, nausea and vomiting.   Neurological:  Negative for headaches.     Objective:     Vital Signs (Most Recent):  Temp: 97.9 °F (36.6 °C) (09/04/23 0751)  Pulse: 61 (09/04/23 0751)  Resp: 20 (09/04/23 0751)  BP: 139/71 (09/04/23 0751)  SpO2: 98 % (09/04/23 0751) Vital Signs (24h Range):  Temp:  [97.2 °F (36.2 °C)-99.5 °F (37.5 °C)] 97.9 °F (36.6 °C)  Pulse:  [56-78] 61  Resp:  [18-26] 20  SpO2:  [93 %-100 %] 98 %  BP: (127-139)/(55-71) 139/71     Weight: 73.7 kg (162 lb 7.7 oz)  Body mass index is 28.78 kg/m².     SpO2: 98 %         Intake/Output Summary (Last 24 hours) at 9/4/2023 0839  Last data filed at 9/4/2023 0110  Gross per 24 hour   Intake 222 ml   Output 1100 ml   Net -878 ml       Lines/Drains/Airways       Drain  Duration             Female External Urinary Catheter 08/29/23 1703 5 days              Peripheral Intravenous Line  Duration                  Peripheral IV - Single Lumen 08/27/23 2145 20 G Anterior;Left Forearm 7 days                       Physical Exam  Vitals and nursing note reviewed.   Constitutional:       General: She is not in acute distress.     Appearance: Normal appearance. She is obese. She is not ill-appearing or diaphoretic.   HENT:      Head: Normocephalic and atraumatic.      Nose: Nose normal.      Mouth/Throat:      Mouth: Mucous membranes are moist.      Pharynx: Oropharynx is clear.   Eyes:      Extraocular Movements: Extraocular movements intact.   Cardiovascular:      Rate and Rhythm: Normal rate and regular rhythm.      Pulses: Normal pulses.      Heart sounds:  Murmur heard.   Pulmonary:      Effort: Pulmonary effort is normal.      Breath sounds: Normal breath sounds. No wheezing or rales.   Abdominal:      General: Bowel sounds are normal.      Palpations: Abdomen is soft.      Tenderness: There is no abdominal tenderness.      Hernia: A hernia (R hernia abdominal hernia, reducable, non-painful) is present.   Musculoskeletal:         General: Normal range of motion.      Cervical back: Normal range of motion and neck supple.      Right lower leg: No edema.      Left lower leg: No edema.   Skin:     General: Skin is warm and dry.   Neurological:      General: No focal deficit present.      Mental Status: She is alert and oriented to person, place, and time.   Psychiatric:         Mood and Affect: Mood normal.         Behavior: Behavior normal.            Significant Labs: CMP   Recent Labs   Lab 09/02/23  1159 09/03/23 0522 09/04/23 0448   NA  --  145 145   K 4.3 4.2 4.2   CL  --  105 104   CO2  --  33* 32*   GLU  --  91 86   BUN  --  48* 52*   CREATININE  --  1.2 1.4   CALCIUM  --  8.7 8.6*   PROT  --  5.0* 5.1*   ALBUMIN  --  2.3* 2.3*   BILITOT  --  0.2 0.2   ALKPHOS  --  62 65   AST  --  12 12   ALT  --  5* 8*   ANIONGAP  --  7* 9    and CBC   Recent Labs   Lab 09/03/23 0522 09/04/23 0448   WBC 8.39 7.38   HGB 8.1* 7.7*   HCT 28.2* 26.7*    175       Significant Imaging:

## 2023-09-04 NOTE — PLAN OF CARE
NURSING HOME ORDERS    09/04/2023  [facility]  [facility] - CARDIOLOGY STEPDOWN  [address]  [facility] [address]  Dept: [phone]  Loc: [phone]     Admit to Nursing Home:  SNF    Diagnoses:  Active Hospital Problems    Diagnosis  POA    *Acute on chronic respiratory failure [J96.20]  Unknown    Debility [R53.81]  Yes    Unstable angina [I20.0]  Yes    Chronic diastolic congestive heart failure [I50.32]  Yes    Chronic kidney disease, stage 4 (severe) [N18.4]  Yes    DM type 2, controlled, with complication [E11.8]  Yes    Gastroesophageal reflux disease without esophagitis [K21.9]  Yes    ASCVD (arteriosclerotic cardiovascular disease) [I25.10]  Yes    Hypercholesterolemia [E78.00]  Yes    Essential hypertension, benign [I10]  Yes      Resolved Hospital Problems    Diagnosis Date Resolved POA    Chronic respiratory failure [J96.10] 09/01/2023 Unknown    CAD (coronary artery disease) [I25.10] 08/28/2023 Yes       Patient is homebound due to:  Acute on chronic respiratory failure    Allergies:  Review of patient's allergies indicates:   Allergen Reactions    Iodinated contrast media     Strawberries [strawberry] Hives and Itching       Vitals:  Routine    Diet: cardiac diet    Activities:   Activity as tolerated    Goals of Care Treatment Preferences:  Code Status: Full Code          What is most important right now is to focus on extending life as long as possible, even it it means sacrificing quality.  Accordingly, we have decided that the best plan to meet the patient's goals includes continuing with treatment.      Labs:      Nursing Precautions:      Consults:   PT to evaluate and treat- 3 times a week and ST to evaluate and treat- 3 times a week     Miscellaneous Care:                    Diabetes Care:        Medications: Discontinue all previous medication orders, if any. See new list below.     Medication List        START taking these medications      amLODIPine 10 MG  tablet  Commonly known as: NORVASC  Take 1 tablet (10 mg total) by mouth once daily.     aspirin 81 MG Chew  Chew and swallow 1 tablet (81 mg total) by mouth once daily.     metoprolol succinate 200 MG 24 hr tablet  Commonly known as: TOPROL-XL  Take 1 tablet (200 mg total) by mouth once daily.     nitroGLYCERIN 0.4 MG SL tablet  Commonly known as: NITROSTAT  Place 1 tablet (0.4 mg total) under the tongue every 5 (five) minutes as needed for Chest pain. Up to 3 doses. If chest pain is not relieved or worsens 3 to 5 minutes after 1 dose, call 911 and seek immediate emergency medical attention.  Replaces: nitroGLYCERIN 0.4 MG/DOSE TL SPRY 400 mcg/spray spray            CONTINUE taking these medications      albuterol 90 mcg/actuation inhaler  Commonly known as: VENTOLIN HFA  Inhale 2 puffs into the lungs every 6 (six) hours as needed for Wheezing or Shortness of Breath. Rescue     * albuterol-ipratropium 2.5 mg-0.5 mg/3 mL nebulizer solution  Commonly known as: DUO-NEB  Take 3 mLs by nebulization every 4 (four) hours as needed for Wheezing or Shortness of Breath. Rescue     * CombiVENT RESPIMAT  mcg/actuation inhaler  Generic drug: ipratropium-albuteroL  Inhale 2 puffs into the lungs every 4 (four) hours as needed for Shortness of Breath. Rescue     allopurinoL 100 MG tablet  Commonly known as: ZYLOPRIM  Take 0.5 tablets (50 mg total) by mouth once daily.     ALPRAZolam 0.25 MG tablet  Commonly known as: XANAX  Take 1 tablet (0.25 mg total) by mouth every evening.     atorvastatin 40 MG tablet  Commonly known as: LIPITOR  Take 1 tablet (40 mg total) by mouth once daily.     clopidogreL 75 mg tablet  Commonly known as: PLAVIX  Take 1 tablet (75 mg total) by mouth once daily.     coenzyme Q10 100 mg capsule  Take 100 mg by mouth every morning.     ergocalciferol 50,000 unit Cap  Commonly known as: VITAMIN D2  Take 1 capsule (50,000 Units total) by mouth every 7 days.     ferrous sulfate 325 (65 FE) MG EC  tablet  Take 325 mg by mouth once daily.     fish oil-omega-3 fatty acids 300-1,000 mg capsule  Take 2 capsules by mouth every morning.     fluticasone-salmeterol 250-50 mcg/dose 250-50 mcg/dose diskus inhaler  Commonly known as: ADVAIR  Inhale 1 puff into the lungs 2 (two) times a day.     furosemide 20 MG tablet  Commonly known as: LASIX  Take 1 tablet (20 mg total) by mouth every other day. TAKE WITH POTASSIUM     INCRUSE ELLIPTA 62.5 mcg/actuation inhalation capsule  Generic drug: umeclidinium  Inhale 62.5 mcg into the lungs every morning. Controller     ketoconazole 2 % cream  Commonly known as: NIZORAL  Apply 1 application  topically 2 (two) times daily.     magnesium oxide 400 mg (241.3 mg magnesium) tablet  Commonly known as: MAG-OX  Take 1 tablet by mouth 2 (two) times daily.     mupirocin 2 % ointment  Commonly known as: BACTROBAN  Apply topically 2 (two) times daily.     pantoprazole 40 MG tablet  Commonly known as: PROTONIX  Take 1 tablet (40 mg total) by mouth once daily.     potassium chloride 10 MEQ Cpsr  Commonly known as: MICRO-K  Take 1 capsule (10 mEq total) by mouth every other day. (TAKE WITH LASIX/FUROSEMIDE)     PRESERVISION AREDS-2 ORAL  Take 1 tablet by mouth once daily.     triamcinolone acetonide 0.1% 0.1 % cream  Commonly known as: KENALOG  Apply 1 g topically 2 (two) times daily.     VITAMIN C 500 MG tablet  Generic drug: ascorbic acid (vitamin C)  Take 500 mg by mouth 2 (two) times daily.           * This list has 2 medication(s) that are the same as other medications prescribed for you. Read the directions carefully, and ask your doctor or other care provider to review them with you.                STOP taking these medications      CHOLESTYRAMINE LIGHT 4 gram Pwpk  Generic drug: cholestyramine-aspartame     colchicine (gout) 0.6 mg tablet  Commonly known as: COLCRYS     isosorbide mononitrate 30 MG 24 hr tablet  Commonly known as: IMDUR     metoprolol tartrate 75 mg Tab      nitroGLYCERIN 0.4 MG/DOSE TL SPRY 400 mcg/spray spray  Commonly known as: NITROLINGUAL  Replaced by: nitroGLYCERIN 0.4 MG SL tablet     ranolazine 500 MG Tb12  Commonly known as: RANEXA                Immunizations Administered as of 9/4/2023       Name Date Dose VIS Date Route Exp Date    COVID-19, MRNA, LN-S, PF (Moderna) 10/28/2021 -- -- Intramuscular --    Site: Left arm     Lot: [id number]     COVID-19, MRNA, LN-S, PF (Moderna) 2/10/2021 -- -- Intramuscular --    Site: Left arm     Lot: [id number]     COVID-19, MRNA, LN-S, PF (Moderna) 1/13/2021 -- -- Intramuscular --    Site: Left arm     Lot: [id number]                 Some patients may experience side effects after vaccination.  These may include fever, headache, muscle or joint aches.  Most symptoms resolve with 24-48 hours and do not require urgent medical evaluation unless they persist for more than 72 hours or symptoms are concerning for an unrelated medical condition.          _________________________________  Farhan Berry DO  09/04/2023

## 2023-09-04 NOTE — PT/OT/SLP DISCHARGE
Occupational Therapy Discharge Summary    Marisol Kerr  MRN: [id number]   Principal Problem: Acute on chronic respiratory failure      Patient Discharged from acute Occupational Therapy on 9/4/23.  Please refer to prior OT note dated 8/30/23 for functional status.    Assessment:       Patient transferred to ICU on 9/1/23 for higher level of care due to SOB and worsening hypercapnea.      Objective:     GOALS:   Multidisciplinary Problems       Occupational Therapy Goals          Problem: Occupational Therapy    Goal Priority Disciplines Outcome Interventions   Occupational Therapy Goal     OT, PT/OT Ongoing, Progressing    Description: Goals to be met by: 9/6/23    Patient will increase functional independence with ADLs by performing:    Grooming while standing at sink with Supervision.  Toileting from toilet with Supervision for hygiene and clothing management.   Supine to sit with Modified [place].  Toilet transfer to toilet with Supervision.                         Reasons for Discontinuation of Therapy Services  Transfer to alternate level of care.      Plan:     Patient Discharged to: In house; need new OT orders to resume care    9/4/2023

## 2023-09-05 VITALS
WEIGHT: 164.44 LBS | SYSTOLIC BLOOD PRESSURE: 128 MMHG | HEART RATE: 67 BPM | DIASTOLIC BLOOD PRESSURE: 78 MMHG | TEMPERATURE: 98 F | HEIGHT: 63 IN | OXYGEN SATURATION: 99 % | RESPIRATION RATE: 18 BRPM | BODY MASS INDEX: 29.14 KG/M2

## 2023-09-05 PROBLEM — R50.9 FEVER: Status: ACTIVE | Noted: 2023-09-05

## 2023-09-05 LAB
ALBUMIN SERPL BCP-MCNC: 2.4 G/DL (ref 3.5–5.2)
ALP SERPL-CCNC: 69 U/L (ref 55–135)
ALT SERPL W/O P-5'-P-CCNC: 15 U/L (ref 10–44)
ANION GAP SERPL CALC-SCNC: 8 MMOL/L (ref 8–16)
AST SERPL-CCNC: 18 U/L (ref 10–40)
BACTERIA #/AREA URNS AUTO: ABNORMAL /HPF
BASOPHILS # BLD AUTO: 0.01 K/UL (ref 0–0.2)
BASOPHILS NFR BLD: 0.1 % (ref 0–1.9)
BILIRUB SERPL-MCNC: 0.2 MG/DL (ref 0.1–1)
BILIRUB UR QL STRIP: NEGATIVE
BUN SERPL-MCNC: 59 MG/DL (ref 8–23)
C DIFF GDH STL QL: POSITIVE
C DIFF TOX A+B STL QL IA: NEGATIVE
C DIFF TOX GENS STL QL NAA+PROBE: NEGATIVE
CALCIUM SERPL-MCNC: 8.4 MG/DL (ref 8.7–10.5)
CHLORIDE SERPL-SCNC: 105 MMOL/L (ref 95–110)
CLARITY UR REFRACT.AUTO: CLEAR
CO2 SERPL-SCNC: 29 MMOL/L (ref 23–29)
COLOR UR AUTO: YELLOW
CREAT SERPL-MCNC: 1.5 MG/DL (ref 0.5–1.4)
DIFFERENTIAL METHOD: ABNORMAL
EOSINOPHIL # BLD AUTO: 0.1 K/UL (ref 0–0.5)
EOSINOPHIL NFR BLD: 1.2 % (ref 0–8)
ERYTHROCYTE [DISTWIDTH] IN BLOOD BY AUTOMATED COUNT: 14.7 % (ref 11.5–14.5)
EST. GFR  (NO RACE VARIABLE): 35.9 ML/MIN/1.73 M^2
GLUCOSE SERPL-MCNC: 105 MG/DL (ref 70–110)
GLUCOSE UR QL STRIP: NEGATIVE
HCT VFR BLD AUTO: 27.1 % (ref 37–48.5)
HGB BLD-MCNC: 8 G/DL (ref 12–16)
HGB UR QL STRIP: NEGATIVE
IMM GRANULOCYTES # BLD AUTO: 0.06 K/UL (ref 0–0.04)
IMM GRANULOCYTES NFR BLD AUTO: 0.5 % (ref 0–0.5)
KETONES UR QL STRIP: NEGATIVE
LEUKOCYTE ESTERASE UR QL STRIP: ABNORMAL
LYMPHOCYTES # BLD AUTO: 1.6 K/UL (ref 1–4.8)
LYMPHOCYTES NFR BLD: 13.7 % (ref 18–48)
MAGNESIUM SERPL-MCNC: 1.9 MG/DL (ref 1.6–2.6)
MCH RBC QN AUTO: 28 PG (ref 27–31)
MCHC RBC AUTO-ENTMCNC: 29.5 G/DL (ref 32–36)
MCV RBC AUTO: 95 FL (ref 82–98)
MICROSCOPIC COMMENT: ABNORMAL
MONOCYTES # BLD AUTO: 0.8 K/UL (ref 0.3–1)
MONOCYTES NFR BLD: 7.3 % (ref 4–15)
NEUTROPHILS # BLD AUTO: 8.9 K/UL (ref 1.8–7.7)
NEUTROPHILS NFR BLD: 77.2 % (ref 38–73)
NITRITE UR QL STRIP: NEGATIVE
NON-SQ EPI CELLS #/AREA URNS AUTO: 1 /HPF
NRBC BLD-RTO: 0 /100 WBC
PH UR STRIP: 5 [PH] (ref 5–8)
PHOSPHATE SERPL-MCNC: 2.9 MG/DL (ref 2.7–4.5)
PLATELET # BLD AUTO: 196 K/UL (ref 150–450)
PMV BLD AUTO: 12.5 FL (ref 9.2–12.9)
POCT GLUCOSE: 115 MG/DL (ref 70–110)
POCT GLUCOSE: 189 MG/DL (ref 70–110)
POTASSIUM SERPL-SCNC: 4.7 MMOL/L (ref 3.5–5.1)
PROT SERPL-MCNC: 5.3 G/DL (ref 6–8.4)
PROT UR QL STRIP: NEGATIVE
RBC # BLD AUTO: 2.86 M/UL (ref 4–5.4)
RBC #/AREA URNS AUTO: 8 /HPF (ref 0–4)
SODIUM SERPL-SCNC: 142 MMOL/L (ref 136–145)
SP GR UR STRIP: 1.02 (ref 1–1.03)
SQUAMOUS #/AREA URNS AUTO: 2 /HPF
URN SPEC COLLECT METH UR: ABNORMAL
WBC # BLD AUTO: 11.57 K/UL (ref 3.9–12.7)
WBC #/AREA URNS AUTO: 75 /HPF (ref 0–5)

## 2023-09-05 PROCEDURE — 83735 ASSAY OF MAGNESIUM: CPT

## 2023-09-05 PROCEDURE — 36415 COLL VENOUS BLD VENIPUNCTURE: CPT

## 2023-09-05 PROCEDURE — 81001 URINALYSIS AUTO W/SCOPE: CPT

## 2023-09-05 PROCEDURE — 84100 ASSAY OF PHOSPHORUS: CPT

## 2023-09-05 PROCEDURE — 80053 COMPREHEN METABOLIC PANEL: CPT

## 2023-09-05 PROCEDURE — 94640 AIRWAY INHALATION TREATMENT: CPT

## 2023-09-05 PROCEDURE — 99239 HOSP IP/OBS DSCHRG MGMT >30: CPT | Mod: GC,,, | Performed by: INTERNAL MEDICINE

## 2023-09-05 PROCEDURE — 25000242 PHARM REV CODE 250 ALT 637 W/ HCPCS

## 2023-09-05 PROCEDURE — 99239 PR HOSPITAL DISCHARGE DAY,>30 MIN: ICD-10-PCS | Mod: GC,,, | Performed by: INTERNAL MEDICINE

## 2023-09-05 PROCEDURE — 25000003 PHARM REV CODE 250: Performed by: STUDENT IN AN ORGANIZED HEALTH CARE EDUCATION/TRAINING PROGRAM

## 2023-09-05 PROCEDURE — 87088 URINE BACTERIA CULTURE: CPT

## 2023-09-05 PROCEDURE — 87186 SC STD MICRODIL/AGAR DIL: CPT

## 2023-09-05 PROCEDURE — 87077 CULTURE AEROBIC IDENTIFY: CPT | Mod: 59

## 2023-09-05 PROCEDURE — 85025 COMPLETE CBC W/AUTO DIFF WBC: CPT

## 2023-09-05 PROCEDURE — 27100171 HC OXYGEN HIGH FLOW UP TO 24 HOURS

## 2023-09-05 PROCEDURE — 25000003 PHARM REV CODE 250

## 2023-09-05 PROCEDURE — 94761 N-INVAS EAR/PLS OXIMETRY MLT: CPT

## 2023-09-05 PROCEDURE — 87086 URINE CULTURE/COLONY COUNT: CPT

## 2023-09-05 PROCEDURE — 99900035 HC TECH TIME PER 15 MIN (STAT)

## 2023-09-05 RX ORDER — CHOLESTYRAMINE 4 G/4.8G
4 POWDER, FOR SUSPENSION ORAL DAILY
Qty: 90 PACKET | Refills: 3 | Status: SHIPPED | OUTPATIENT
Start: 2023-09-05 | End: 2023-09-05 | Stop reason: HOSPADM

## 2023-09-05 RX ORDER — CHOLESTYRAMINE 4 G/4.8G
4 POWDER, FOR SUSPENSION ORAL DAILY
Status: DISCONTINUED | OUTPATIENT
Start: 2023-09-05 | End: 2023-09-05 | Stop reason: HOSPADM

## 2023-09-05 RX ADMIN — CLOPIDOGREL BISULFATE 75 MG: 75 TABLET ORAL at 08:09

## 2023-09-05 RX ADMIN — IPRATROPIUM BROMIDE AND ALBUTEROL SULFATE 3 ML: 2.5; .5 SOLUTION RESPIRATORY (INHALATION) at 12:09

## 2023-09-05 RX ADMIN — PANTOPRAZOLE SODIUM 40 MG: 40 TABLET, DELAYED RELEASE ORAL at 08:09

## 2023-09-05 RX ADMIN — IPRATROPIUM BROMIDE AND ALBUTEROL SULFATE 3 ML: 2.5; .5 SOLUTION RESPIRATORY (INHALATION) at 04:09

## 2023-09-05 RX ADMIN — AMLODIPINE BESYLATE 5 MG: 5 TABLET ORAL at 08:09

## 2023-09-05 RX ADMIN — CHOLESTYRAMINE 4 G: 4 POWDER, FOR SUSPENSION ORAL at 02:09

## 2023-09-05 RX ADMIN — METOPROLOL SUCCINATE 200 MG: 100 TABLET, EXTENDED RELEASE ORAL at 08:09

## 2023-09-05 RX ADMIN — ASPIRIN 81 MG 81 MG: 81 TABLET ORAL at 08:09

## 2023-09-05 NOTE — ASSESSMENT & PLAN NOTE
Temperature of 101.2° overnight.  Patient reported multiple episodes of loose stools.  No recent antibiotics use.    Unknown etiology.  Afebrile at the moment.  Unlikely C diff. as patient has history of cholecystectomy and has history of loose stools without taking home meds.    Results:    Plan:  - f/u c.diff  - f/u UA

## 2023-09-05 NOTE — PLAN OF CARE
[facility] - Cardiology Stepdown  Discharge Final Note    Primary Care Provider: Khadar Dwyer MD    Expected Discharge Date: 9/5/2023    Final Discharge Note (most recent)       Final Note - 09/05/23 1420          Final Note    Assessment Type Final Discharge Note     Anticipated Discharge Disposition Long Term Acute Care        Post-Acute Status    Post-Acute Authorization Placement     Post-Acute Placement Status Set-up Complete/Auth obtained                 Per Lauren with [facility] pt is too acute for them to accept as SNF but they can accept her as LTAC.  ADRIANA sent additional LTAC referral to [facility] in [place], since pt's daughter had previously mentioned that location as their top choice, who also accepted per Lena in admissions.      ADRIANA called and spoke with pt's daughter Marisol who voiced agreement with plan for LTAC.  ADRIANA informed Marisol that pt has been accepted by the following LTAC facilities:  [facility]    Marisol instructed to identify preference.    Preferred Facility: (if more than 1, listed in order of descending preference)  [facility]    Transportation scheduled by [facility] for 4:30pm.  RN Jeane/Kimberly to call report at 4:30pm to [phone], opt 1, and ask for the charge nurse.  RN's Jeane and Kimberly were notified of the above.  ADRIANA also called and notified pt's daughter Marisol of transfer.      Ronda Esquivel LMSW  [facility]  [id number]      Important Message from Medicare  Important Message from Medicare regarding Discharge Appeal Rights: Other (comments) (Refuse to sign)     Date IMM was signed: 09/01/23  Time IMM was signed: [id number]

## 2023-09-05 NOTE — SUBJECTIVE & OBJECTIVE
Interval History: Febrile with t max of 101.2.  Reported of multiple episodes of loose stools.  Patient states that she normally has loose stools since her cholecystectomy.  Has not been on home medications for it. Denies any dysuria.    Review of Systems   Constitutional: Positive for fever. Negative for chills.   HENT:  Negative for congestion.    Cardiovascular:  Negative for chest pain and dyspnea on exertion.   Respiratory:  Negative for cough and shortness of breath.    Musculoskeletal:  Negative for back pain.   Gastrointestinal:  Positive for diarrhea. Negative for abdominal pain, nausea and vomiting.   Neurological:  Negative for headaches.     Objective:     Vital Signs (Most Recent):  Temp: 98.8 °F (37.1 °C) (09/05/23 1112)  Pulse: 60 (09/05/23 1112)  Resp: (!) 2 (09/05/23 1112)  BP: (!) 102/47 (09/05/23 1112)  SpO2: 98 % (09/05/23 1112) Vital Signs (24h Range):  Temp:  [97.3 °F (36.3 °C)-101.2 °F (38.4 °C)] 98.8 °F (37.1 °C)  Pulse:  [55-78] 60  Resp:  [2-27] 2  SpO2:  [95 %-100 %] 98 %  BP: ()/(44-90) 102/47     Weight: 74.6 kg (164 lb 7.4 oz)  Body mass index is 29.13 kg/m².     SpO2: 98 %         Intake/Output Summary (Last 24 hours) at 9/5/2023 1143  Last data filed at 9/5/2023 0000  Gross per 24 hour   Intake 714 ml   Output 600 ml   Net 114 ml       Lines/Drains/Airways       Peripheral Intravenous Line  Duration                  Peripheral IV - Single Lumen 09/04/23 1408 20 G Left Antecubital <1 day                       Physical Exam  Vitals and nursing note reviewed.   Constitutional:       General: She is not in acute distress.     Appearance: Normal appearance. She is obese. She is not ill-appearing or diaphoretic.   HENT:      Head: Normocephalic and atraumatic.      Nose: Nose normal.      Mouth/Throat:      Mouth: Mucous membranes are moist.      Pharynx: Oropharynx is clear.   Eyes:      Extraocular Movements: Extraocular movements intact.   Cardiovascular:      Rate and Rhythm:  Normal rate and regular rhythm.      Pulses: Normal pulses.      Heart sounds: Murmur heard.   Pulmonary:      Effort: Pulmonary effort is normal.      Breath sounds: Normal breath sounds. No wheezing or rales.   Abdominal:      General: Bowel sounds are normal.      Palpations: Abdomen is soft.      Tenderness: There is no abdominal tenderness.      Hernia: A hernia (R hernia abdominal hernia, reducable, non-painful) is present.   Musculoskeletal:         General: Normal range of motion.      Cervical back: Normal range of motion and neck supple.      Right lower leg: No edema.      Left lower leg: No edema.   Skin:     General: Skin is warm and dry.   Neurological:      General: No focal deficit present.      Mental Status: She is alert and oriented to person, place, and time.   Psychiatric:         Mood and Affect: Mood normal.         Behavior: Behavior normal.            Significant Labs: CMP   Recent Labs   Lab 09/04/23 0448 09/05/23 0334    142   K 4.2 4.7    105   CO2 32* 29   GLU 86 105   BUN 52* 59*   CREATININE 1.4 1.5*   CALCIUM 8.6* 8.4*   PROT 5.1* 5.3*   ALBUMIN 2.3* 2.4*   BILITOT 0.2 0.2   ALKPHOS 65 69   AST 12 18   ALT 8* 15   ANIONGAP 9 8    and CBC   Recent Labs   Lab 09/04/23 0448 09/05/23  0334   WBC 7.38 11.57   HGB 7.7* 8.0*   HCT 26.7* 27.1*    196       Significant Imaging:

## 2023-09-05 NOTE — PT/OT/SLP PROGRESS
Occupational Therapy      Patient Name:  Marisol Kerr   MRN:  [id number]    Patient not seen today secondary to Patient unwilling to participate at OT attempt at [phone]. Patient educated on importance of OOB activity and reducing sedentary behavior. Will follow-up as appropriate.    9/5/2023

## 2023-09-05 NOTE — PLAN OF CARE
[facility]    FACILITY TRANSFER ORDERS      Patient Name: Marisol Kerr  YOB: 1945    PCP: Khadar Dwyer MD   PCP Address: [facility] [facility] [address] [facility] / [place]*  PCP Phone Number: [phone]  PCP Fax: [phone]    Encounter Date: 09/05/2023    Admit to: LTACH    Vital Signs:  Routine    Diagnoses:   Active Hospital Problems    Diagnosis  POA    *Acute on chronic respiratory failure [J96.20]  Unknown    Debility [R53.81]  Yes    Unstable angina [I20.0]  Yes    Chronic diastolic congestive heart failure [I50.32]  Yes    Chronic kidney disease, stage 4 (severe) [N18.4]  Yes    DM type 2, controlled, with complication [E11.8]  Yes    Gastroesophageal reflux disease without esophagitis [K21.9]  Yes    ASCVD (arteriosclerotic cardiovascular disease) [I25.10]  Yes    Hypercholesterolemia [E78.00]  Yes    Essential hypertension, benign [I10]  Yes      Resolved Hospital Problems    Diagnosis Date Resolved POA    Chronic respiratory failure [J96.10] 09/01/2023 Unknown    CAD (coronary artery disease) [I25.10] 08/28/2023 Yes       Allergies:  Review of patient's allergies indicates:   Allergen Reactions    Iodinated contrast media     Strawberries [strawberry] Hives and Itching       Diet: cardiac diet    Activities: Activity as tolerated    Goals of Care Treatment Preferences:  Code Status: Full Code          What is most important right now is to focus on extending life as long as possible, even it it means sacrificing quality.  Accordingly, we have decided that the best plan to meet the patient's goals includes continuing with treatment.      Nursing:      Labs: CBC and CMP Once     CONSULTS:    Physical Therapy to evaluate and treat.  and Occupational Therapy to evaluate and treat.    MISCELLANEOUS CARE:       WOUND CARE ORDERS  None    Medications: Review discharge medications with patient and family and provide education.      Current Discharge Medication List         START taking these medications    Details   amLODIPine (NORVASC) 10 MG tablet Take 1 tablet (10 mg total) by mouth once daily.  Qty: 30 tablet, Refills: 11    Comments: .      aspirin 81 MG Chew Chew and swallow 1 tablet (81 mg total) by mouth once daily.  Qty: 30 tablet, Refills: 11      metoprolol succinate (TOPROL-XL) 200 MG 24 hr tablet Take 1 tablet (200 mg total) by mouth once daily.  Qty: 30 tablet, Refills: 5    Comments: .      nitroGLYCERIN (NITROSTAT) 0.4 MG SL tablet Place 1 tablet (0.4 mg total) under the tongue every 5 (five) minutes as needed for Chest pain. Up to 3 doses. If chest pain is not relieved or worsens 3 to 5 minutes after 1 dose, call 911 and seek immediate emergency medical attention.  Qty: 25 tablet, Refills: 2           CONTINUE these medications which have CHANGED    Details   clopidogreL (PLAVIX) 75 mg tablet Take 1 tablet (75 mg total) by mouth once daily.  Qty: 30 tablet, Refills: 11           CONTINUE these medications which have NOT CHANGED    Details   albuterol (VENTOLIN HFA) 90 mcg/actuation inhaler Inhale 2 puffs into the lungs every 6 (six) hours as needed for Wheezing or Shortness of Breath. Rescue  Qty: 36 g, Refills: 3    Associated Diagnoses: Panlobular emphysema      albuterol-ipratropium (DUO-NEB) 2.5 mg-0.5 mg/3 mL nebulizer solution Take 3 mLs by nebulization every 4 (four) hours as needed for Wheezing or Shortness of Breath. Rescue  Qty: 120 each, Refills: 6    Associated Diagnoses: Chronic hypoxemic respiratory failure      allopurinoL (ZYLOPRIM) 100 MG tablet Take 0.5 tablets (50 mg total) by mouth once daily.  Qty: 45 tablet, Refills: 1    Associated Diagnoses: Gout, unspecified cause, unspecified chronicity, unspecified site      ALPRAZolam (XANAX) 0.25 MG tablet Take 1 tablet (0.25 mg total) by mouth every evening.  Qty: 90 tablet, Refills: 1    Associated Diagnoses: Anxiety      atorvastatin (LIPITOR) 40 MG tablet Take 1 tablet (40 mg total) by mouth  once daily.  Qty: 90 tablet, Refills: 3      coenzyme Q10 100 mg capsule Take 100 mg by mouth every morning.      ergocalciferol (VITAMIN D2) 50,000 unit Cap Take 1 capsule (50,000 Units total) by mouth every 7 days.  Qty: 12 capsule, Refills: 1    Associated Diagnoses: Vitamin D deficiency      ferrous sulfate 325 (65 FE) MG EC tablet Take 325 mg by mouth once daily.      fish oil-omega-3 fatty acids 300-1,000 mg capsule Take 2 capsules by mouth every morning.      fluticasone-salmeterol diskus inhaler 250-50 mcg Inhale 1 puff into the lungs 2 (two) times a day.      furosemide (LASIX) 20 MG tablet Take 1 tablet (20 mg total) by mouth every other day. TAKE WITH POTASSIUM  Qty: 30 tablet, Refills: 4    Associated Diagnoses: Coronary artery disease of native artery of native heart with stable angina pectoris      ipratropium-albuteroL (COMBIVENT RESPIMAT)  mcg/actuation inhaler Inhale 2 puffs into the lungs every 4 (four) hours as needed for Shortness of Breath. Rescue  Qty: 12 g, Refills: 3    Comments: Please consider 90 day supplies to promote better adherence  Associated Diagnoses: Chronic hypoxemic respiratory failure      ketoconazole (NIZORAL) 2 % cream Apply 1 application  topically 2 (two) times daily.      magnesium oxide (MAG-OX) 400 mg (241.3 mg magnesium) tablet Take 1 tablet by mouth 2 (two) times daily.      mupirocin (BACTROBAN) 2 % ointment Apply topically 2 (two) times daily.  Qty: 30 g, Refills: 3    Associated Diagnoses: Sacral decubitus ulcer, stage II      pantoprazole (PROTONIX) 40 MG tablet Take 1 tablet (40 mg total) by mouth once daily.  Qty: 90 tablet, Refills: 3    Associated Diagnoses: Gastroesophageal reflux disease without esophagitis      potassium chloride (MICRO-K) 10 MEQ CpSR Take 1 capsule (10 mEq total) by mouth every other day. (TAKE WITH LASIX/FUROSEMIDE)  Qty: 15 capsule, Refills: 0      triamcinolone acetonide 0.1% (KENALOG) 0.1 % cream Apply 1 g topically 2 (two) times  daily.      umeclidinium (INCRUSE ELLIPTA) 62.5 mcg/actuation inhalation capsule Inhale 62.5 mcg into the lungs every morning. Controller  Qty: 90 each, Refills: 3    Associated Diagnoses: Chronic hypoxemic respiratory failure      vit C/E/Zn/coppr/lutein/zeaxan (PRESERVISION AREDS-2 ORAL) Take 1 tablet by mouth once daily.      VITAMIN C 500 MG tablet Take 500 mg by mouth 2 (two) times daily.           STOP taking these medications       cholestyramine-aspartame (CHOLESTYRAMINE LIGHT) 4 gram PwPk Comments:   Reason for Stopping:         colchicine (COLCRYS) 0.6 mg tablet Comments:   Reason for Stopping:         isosorbide mononitrate (IMDUR) 30 MG 24 hr tablet Comments:   Reason for Stopping:         metoprolol tartrate 75 mg Tab Comments:   Reason for Stopping:         nitroGLYCERIN 0.4 MG/DOSE TL SPRY (NITROLINGUAL) 400 mcg/spray spray Comments:   Reason for Stopping:         ranolazine (RANEXA) 500 MG Tb12 Comments:   Reason for Stopping:                  Immunizations Administered as of 9/5/2023       Name Date Dose VIS Date Route Exp Date    COVID-19, MRNA, LN-S, PF (Moderna) 10/28/2021 -- -- Intramuscular --    Site: Left arm     Lot: [id number]     COVID-19, MRNA, LN-S, PF (Moderna) 2/10/2021 -- -- Intramuscular --    Site: Left arm     Lot: [id number]     COVID-19, MRNA, LN-S, PF (Moderna) 1/13/2021 -- -- Intramuscular --    Site: Left arm     Lot: [id number]                 Some patients may experience side effects after vaccination.  These may include fever, headache, muscle or joint aches.  Most symptoms resolve with 24-48 hours and do not require urgent medical evaluation unless they persist for more than 72 hours or symptoms are concerning for an unrelated medical condition.          _________________________________  Farhan Berry,   09/05/2023

## 2023-09-05 NOTE — NURSING
Patient having multiple episodes of diarrhea. MD notified, stool sample collected for C.Diff testing. Patient also requesting bipap at the beginning of shift and also requesting prn breathing treatments despite scheduled treatments. Patient says it is really hard to breathe. O2 sats are >95, dropped to low 80s during panic attack. Nightly PRN med given for anxiety, respiratory at bedside, O2 sats improved, will continue to monitor.

## 2023-09-05 NOTE — NURSING
Patient is ready for discharge. Patient stable alert and oriented. IV removed. No complaints of pain. Discussed discharge plan. Pt left with personal belongings including cell phone, , glasses, hearing aid in R ear, and dentures in bag.

## 2023-09-05 NOTE — PROGRESS NOTES
[facility] - [facility] Stepdown  Cardiology  Progress Note    Patient Name: Marisol Kerr  MRN: [id number]  Admission Date: 8/29/2023  Hospital Length of Stay: 7 days  Code Status: Full Code   Attending Physician: Neal Llanes MD   Primary Care Physician: Khadar Dwyer MD  Expected Discharge Date: 9/5/2023  Principal Problem:Acute on chronic respiratory failure    Subjective:     Hospital Course:   Patient admitted to CCU for further management. Patient underwent LHC which showed severe stenosis of Lcx with iFR 0.74 and  of the mRCA. Patient had BANDAR to Lcx. PT/OT recommending SNF. Patient became anxious, VBG showed hypercapnia. Bipap ordered and patient transferred to ICU but patient never obtained Bipap. SOB and anxiety improved and patient was stepped back down. Had fever on 9/4/23. Reports of loose stool c. Diff was ordered. No history of abx use. Patient has a history of cholecystectomy in the past.  She normally takes  cholestyramine-aspartame for her diarrhea at home.  We will restart.  Patient pending LTACH placement.      Interval History: Febrile with t max of 101.2.  Reported of multiple episodes of loose stools.  Patient states that she normally has loose stools since her cholecystectomy.  Has not been on home medications for it. Denies any dysuria.    Review of Systems   Constitutional: Positive for fever. Negative for chills.   HENT:  Negative for congestion.    Cardiovascular:  Negative for chest pain and dyspnea on exertion.   Respiratory:  Negative for cough and shortness of breath.    Musculoskeletal:  Negative for back pain.   Gastrointestinal:  Positive for diarrhea. Negative for abdominal pain, nausea and vomiting.   Neurological:  Negative for headaches.     Objective:     Vital Signs (Most Recent):  Temp: 98.8 °F (37.1 °C) (09/05/23 1112)  Pulse: 60 (09/05/23 1112)  Resp: (!) 2 (09/05/23 1112)  BP: (!) 102/47 (09/05/23 1112)  SpO2: 98 % (09/05/23 1112) Vital Signs (24h Range):  Temp:   [97.3 °F (36.3 °C)-101.2 °F (38.4 °C)] 98.8 °F (37.1 °C)  Pulse:  [55-78] 60  Resp:  [2-27] 2  SpO2:  [95 %-100 %] 98 %  BP: ()/(44-90) 102/47     Weight: 74.6 kg (164 lb 7.4 oz)  Body mass index is 29.13 kg/m².     SpO2: 98 %         Intake/Output Summary (Last 24 hours) at 9/5/2023 1143  Last data filed at 9/5/2023 0000  Gross per 24 hour   Intake 714 ml   Output 600 ml   Net 114 ml       Lines/Drains/Airways       Peripheral Intravenous Line  Duration                  Peripheral IV - Single Lumen 09/04/23 1408 20 G Left Antecubital <1 day                       Physical Exam  Vitals and nursing note reviewed.   Constitutional:       General: She is not in acute distress.     Appearance: Normal appearance. She is obese. She is not ill-appearing or diaphoretic.   HENT:      Head: Normocephalic and atraumatic.      Nose: Nose normal.      Mouth/Throat:      Mouth: Mucous membranes are moist.      Pharynx: Oropharynx is clear.   Eyes:      Extraocular Movements: Extraocular movements intact.   Cardiovascular:      Rate and Rhythm: Normal rate and regular rhythm.      Pulses: Normal pulses.      Heart sounds: Murmur heard.   Pulmonary:      Effort: Pulmonary effort is normal.      Breath sounds: Normal breath sounds. No wheezing or rales.   Abdominal:      General: Bowel sounds are normal.      Palpations: Abdomen is soft.      Tenderness: There is no abdominal tenderness.      Hernia: A hernia (R hernia abdominal hernia, reducable, non-painful) is present.   Musculoskeletal:         General: Normal range of motion.      Cervical back: Normal range of motion and neck supple.      Right lower leg: No edema.      Left lower leg: No edema.   Skin:     General: Skin is warm and dry.   Neurological:      General: No focal deficit present.      Mental Status: She is alert and oriented to person, place, and time.   Psychiatric:         Mood and Affect: Mood normal.         Behavior: Behavior normal.             Significant Labs: CMP   Recent Labs   Lab 09/04/23  0448 09/05/23  0334    142   K 4.2 4.7    105   CO2 32* 29   GLU 86 105   BUN 52* 59*   CREATININE 1.4 1.5*   CALCIUM 8.6* 8.4*   PROT 5.1* 5.3*   ALBUMIN 2.3* 2.4*   BILITOT 0.2 0.2   ALKPHOS 65 69   AST 12 18   ALT 8* 15   ANIONGAP 9 8    and CBC   Recent Labs   Lab 09/04/23  0448 09/05/23  0334   WBC 7.38 11.57   HGB 7.7* 8.0*   HCT 26.7* 27.1*    196       Significant Imaging:       Assessment and Plan:       * Acute on chronic respiratory failure  Patient with Hypercapnic and Hypoxic Respiratory failure which is Chronic.  she is on home oxygen at 4 LPM. Supplemental oxygen was provided and noted-      .   Signs/symptoms of respiratory failure include- tachypnea. Contributing diagnoses includes - CHF and COPD Labs and images were reviewed. Patient Has not had a recent ABG. Will treat underlying causes and adjust management of respiratory failure as follows-     Scheduled inhalers  Continued home benzo  Prn benzo for anxiety    Fever  Temperature of 101.2° overnight.  Patient reported multiple episodes of loose stools.  No recent antibiotics use.    Unknown etiology.  Afebrile at the moment.  Unlikely C diff. as patient has history of cholecystectomy and has history of loose stools without taking home meds.    Results:    Plan:  - f/u c.diff  - f/u UA    Debility  Patient mostly bedbound. Had rehab in the past. Will have PT/OT eval for discharge needs    Unstable angina  78 yoF admitted for ongoing chest pain. Noted to have elevated troponins at OSH but here had troponin of 0.013. patient has some chest pain but only when anxious. Patient has severe multivessel disease. Patient transferred for C. Patient underwent LHC which showed severe stenosis of Lcx with iFR 0.74 and  of the mRCA. Patient had BANDAR to Lcx.      - continue DAPT    Chronic diastolic congestive heart failure  Patient is identified as having Diastolic (HFpEF) heart  failure that is Chronic. CHF is currently controlled. Latest ECHO performed and demonstrates- Results for orders placed during the hospital encounter of 08/25/23    Echo Saline Bubble? No    Interpretation Summary    Left Ventricle: The left ventricle is normal in size. Moderately increased ventricular mass. Moderately increased wall thickness. There is moderate concentrict hypertrophy. Normal wall motion. There is normal systolic function.  EF 61% There is normal diastolic function.    Left Atrium: Left atrium is moderately dilated.    Right Ventricle: Normal right ventricular cavity size. Wall thickness is normal. Right ventricle wall motion  is normal. Systolic function is normal.    Mitral Valve: Mildly thickened anterior leaflet. Mild mitral annular calcification.    IVC/SVC: Normal venous pressure at 3 mmHg.    Pericardium: There is an effusion.    Limited echo; no gross pulmonary hypertension but poor visualization of tricuspid signal    No tissue Doppler performed to assess mean left atrial pressure  . Continue Beta Blocker, Furosemide and Nitrate/Vasodilator and monitor clinical status closely. Monitor on telemetry. Patient is off CHF pathway.  Monitor strict Is&Os and daily weights.  Place on fluid restriction of 1.5 L. Continue to stress to patient importance of self efficacy and  on diet for CHF. Last BNP reviewed- and noted below   Recent Labs   Lab 08/29/23  0855   *   .    COPD/emphysema  Currently on 2 L NC    Plan:  - Duonebs q4hr      Chronic kidney disease, stage 4 (severe)  Patient with chronic CKD baseline Cr 1.5-1.7.     - avoid nephrotoxic agents or renally dose medications  - trend Cr    Gastroesophageal reflux disease without esophagitis  Continue ppi    DM type 2, controlled, with complication  Last A1c on 8/25 showed 5.0. BG slightly elevated.     - LDSSI  - POCT glucose    Essential hypertension, benign  continue amlodipine    Hypercholesterolemia  Statin held at  OSH    Plan  - will resume when clinically indicated        VTE Risk Mitigation (From admission, onward)         Ordered     enoxaparin injection 40 mg  Every 24 hours         08/30/23 0922     IP VTE HIGH RISK PATIENT  Once         08/29/23 0730     Place sequential compression device  Until discontinued         08/29/23 0730                Farhan Berry DO  Cardiology  [facility] - Card[facility] Stepdown

## 2023-09-05 NOTE — PT/OT/SLP PROGRESS
Physical Therapy      Patient Name:  Marisol Kerr   MRN:  [id number]    Patient not seen today secondary to patient refused 2/2 fatigue. Writing therapist emphasized need for OOB mobility to prevent deconditioning and weakness. Patient continuously requested to be seen tomorrow . Will follow-up next scheduled visit.

## 2023-09-06 ENCOUNTER — OUTPATIENT CASE MANAGEMENT (OUTPATIENT)
Dept: ADMINISTRATIVE | Facility: OTHER | Age: 78
End: 2023-09-06
Payer: MEDICARE

## 2023-09-06 ENCOUNTER — PATIENT OUTREACH (OUTPATIENT)
Dept: ADMINISTRATIVE | Facility: CLINIC | Age: 78
End: 2023-09-06
Payer: MEDICARE

## 2023-09-06 LAB — POCT GLUCOSE: 109 MG/DL (ref 70–110)

## 2023-09-06 NOTE — PROGRESS NOTES
09/06/23-Patient discharged to [facility] on 09/05/2023. OPCM Case Closure.

## 2023-09-07 LAB — POCT GLUCOSE: 84 MG/DL (ref 70–110)

## 2023-09-07 NOTE — PHYSICIAN QUERY
PT Name: Marisol Kerr  MR #: [id number]     DOCUMENTATION CLARIFICATION     CDS/: Nuha Srivastava RN              Contact information: [email or web address]  This form is a permanent document in the medical record.     Query Date: September 7, 2023    By submitting this query, we are merely seeking further clarification of documentation.  Please utilize your independent clinical judgment when addressing the question(s) below.    The Medical Record contains the following   Indicators Supporting Clinical Findings Location in Medical Record   x Heart Failure documented Chronic diastolic congestive heart failure  Patient is identified as having Diastolic (HFpEF) heart failure that is Chronic. CHF is currently controlled. H&P 8/29, PN 8/30,8/31,9/1,9/2,9/3,9/4,9/5         x BNP    08/29/23 08:55 08/31/23 18:17 09/01/23 03:50    (H) 697 (H) 527 (H)    Labs 8/29-9/1             x EF/Echo Echo:    Left Ventricle: The left ventricle is normal in size. Moderately increased ventricular mass. Moderately increased wall thickness. There is moderate concentrict hypertrophy. Normal wall motion. There is normal systolic function.  EF 61% There is normal diastolic function.    Left Atrium: Left atrium is moderately dilated.    Right Ventricle: Normal right ventricular cavity size. Wall thickness is normal. Right ventricle wall motion  is normal. Systolic function is normal.    Mitral Valve: Mildly thickened anterior leaflet. Mild mitral annular calcification.    IVC/SVC: Normal venous pressure at 3 mmHg.    Pericardium: There is an effusion.    Limited echo; no gross pulmonary hypertension but poor visualization of tricuspid signal    No tissue Doppler performed to assess mean left atrial pressure    Echo 8/26   x Radiology findings CXR:  Impression:  Worsening pulmonary vascular congestion with new bilateral pleural effusions.  The findings are suggestive of worsening fluid overload state.     FINDINGS:  The  cardiomediastinal silhouette is not enlarged, magnified by technique noting calcification of the aorta..  There is obscuration of the bilateral costophrenic angles suggesting effusion..  The trachea is midline.  The lungs are symmetrically expanded bilaterally with patchy increased interstitial and parenchymal attenuation bilaterally suggesting edema, appears to have worsened somewhat since the previous exam.  Developing left lower lung zone consolidation not excluded..  There is no pneumothorax.  The osseous structures are unchanged..    CXR 8/31            CXR 9/1               x Subjective/Objective Respiratory Conditions Respiratory:  Negative for shortness of breath and wheezing.    Patient had SOB overnight. VBG showed some evidence of hypercapnia. Patient placed on CPAP   H&P 8/29    Cards PN 9/1    Recent/Current MI      Heart Transplant, LVAD     x Edema, JVD  Right lower leg: Edema present.     Left lower leg: Edema present. H&P 8/29        Ascites     x Diuretics/Meds Lasix 20 mg PO QOD  Lasix 40 mg IV x 1 dose  Lasix 20 mg PO QD   MAR 8/29-9/1  MAR 9/1  MAR 9/2-9/5    Other Treatment      Other       Heart failure is a clinical diagnosis which includes symptomatic fluid retention, elevated intracardiac pressures, and/or the inability of the heart to deliver adequate blood flow.    Heart Failure with reduced Ejection Fraction (HFrEF) or Systolic Heart Failure (loses ability to contract normally, EF is <40%)    Heart Failure with preserved Ejection Fraction (HFpEF) or Diastolic Heart Failure (stiff ventricles, does not relax properly, EF is >50%)     Heart Failure with Combined Systolic and Diastolic Failure (stiff ventricles, does not relax properly and EF is <50%)    Mid-range or mildly reduced ejection fraction (HFmrEF) is classified as systolic heart failure.  Congestive heart failure with a recovered EF is classified as Diastolic Heart Failure.  Common clues to acute exacerbation:  Rapidly progressive  symptoms (w/in 2 weeks of presentation), using IV diuretics, using supplemental O2, pulmonary edema on Xray, new or worsening pleural effusion, +JVD or other signs of volume overload, MI w/in 4 weeks, and/or BNP >500  The clinical guidelines noted are only system guidelines, and do not replace the providers clinical judgment.    Provider, please clarify the heart failure diagnosis associated with the above clinical findings.    [  x ]  Acute on Chronic Diastolic Heart Failure (HFpEF) - worsening of CHF signs/symptoms in preexisting CHF     [   ]  Chronic Diastolic Heart Failure (HFpEF) - preexisting and stable     [   ]  Other (please specify): ___________________________________         Please document in your progress notes daily for the duration of treatment until resolved and include in your discharge summary.    References:  American Heart Association editorial staff. (2017, May). Ejection Fraction Heart Failure Measurement. American Heart Association. https://www.heart.org/en/health-topics/heart-failure/diagnosing-heart-failure/ejection-fraction-heart-failure-measurement#:~:text=Ejection%20fraction%20(EF)%20is%20a,pushed%20out%20with%20each%20heartbeat  SHWETA Desir (2020, December 15). Heart failure with preserved ejection fraction: Clinical manifestations and diagnosis. [employer]. https://www.[employer].com/contents/heart-failure-with-preserved-ejection-fraction-clinical-manifestations-and-diagnosis.  ICD-10-CM/PCS Coding Clinic Third Quarter ICD-10, Effective with discharges: September 8, 2020 [facility] § Heart failure with mid-range or mildly reduced ejection fraction (2020).  ICD-10-CM/PCS Coding Clinic Third Quarter ICD-10, Effective with discharges: September 8, 2020 [facility] § Heart failure with recovered ejection fraction (2020).  Form No. 19857

## 2023-09-08 LAB
BACTERIA UR CULT: ABNORMAL
BACTERIA UR CULT: ABNORMAL

## 2023-09-14 NOTE — DISCHARGE SUMMARY
[facility] - Cardiology Stepdown  Cardiology  Discharge Summary      Patient Name: Marisol Kerr  MRN: [id number]  Admission Date: 8/29/2023  Hospital Length of Stay: 7 days  Discharge Date and Time: 9/5/2023  7:35 PM  Attending Physician: No att. providers found    Discharging Provider: Farhan Berry DO  Primary Care Physician: Khadar Dwyer MD    HPI:   This is a 77 year old lady with severe multivessel CAD not CABG candidate, COPD on home 4L NC, HFpEF (Ef61%), CKD3, and T2DM who presented at [facility] with chest pain and respiratory distress. Patient had multiple prior admission to [facility] for angina and CHF exacerbations. Patient had taken multiple NTG for chest pain and was noted to have been worried about overdosing on the NTG for which she was anxious and BP was spiking during the anxious episodes to the 216s systolic which came down with Tridil gtt but patient was still complaining of intermittent chest pain. Cardiology evaluated patient at [facility] and was thinking that patient's known Lcx stenosis was the culprit of the chest pain and patient was transferred to [facility] for further interventional evaluation.     Upon chart review, patient was noted to be on BiPAP for worsening shortness of breath. Prior cath on 2/10/22 showed:    RPAV lesion was 100% stenosed.  RPDA lesion was 100% stenosed.  Prox Cx to Mid Cx lesion was 80% stenosed.  Dist Cx lesion was 70% stenosed.  1st LPL lesion was 90% stenosed.  Prox RCA-2 lesion was 70% stenosed.  Prox RCA-1 lesion was 90% stenosed.  Mid LAD-2 lesion was 60% stenosed.    Echo from OSH on 8/26/23 showed EF of 61% with CVP of 3 and moderately dilated LA.       Procedure(s) (LRB):  Stent, Drug Eluting, Single Vessel, Coronary (N/A)  Instantaneous Wave-Free Ratio (IFR) (N/A)  ANGIOGRAM, CORONARY ARTERY (N/A)     Indwelling Lines/Drains at time of discharge:  Lines/Drains/Airways     None                 Hospital Course:  Patient admitted to CCU for further management. Patient  "underwent LHC which showed severe stenosis of Lcx with iFR 0.74 and  of the mRCA. Patient had BANDAR to Lcx. PT/OT recommending SNF. Patient became anxious, VBG showed hypercapnia. Bipap ordered and patient transferred to ICU but patient never obtained Bipap. SOB and anxiety improved and patient was stepped back down. Had fever on 9/4/23. Reports of loose stool c. Diff was ordered. No history of abx use. Patient has a history of cholecystectomy in the past.  She normally takes  cholestyramine-aspartame for her diarrhea at home.  We will restart prior to admission. Patient stable to discharge to LTACH .      Goals of Care Treatment Preferences:  Code Status: Full Code          What is most important right now is to focus on extending life as long as possible, even it it means sacrificing quality.  Accordingly, we have decided that the best plan to meet the patient's goals includes continuing with treatment.    /78 (BP Location: Left arm, Patient Position: Lying)   Pulse 67   Temp 98.2 °F (36.8 °C) (Oral)   Resp 18   Ht 5' 3" (1.6 m)   Wt 74.6 kg (164 lb 7.4 oz)   SpO2 99%   BMI 29.13 kg/m²       Physical Exam  Vitals and nursing note reviewed.   Constitutional:       General: She is not in acute distress.     Appearance: Normal appearance. She is obese. She is not ill-appearing or diaphoretic.   HENT:      Head: Normocephalic and atraumatic.      Nose: Nose normal.      Mouth/Throat:      Mouth: Mucous membranes are moist.      Pharynx: Oropharynx is clear.   Eyes:      Extraocular Movements: Extraocular movements intact.   Cardiovascular:      Rate and Rhythm: Normal rate and regular rhythm.      Pulses: Normal pulses.      Heart sounds: Murmur heard.   Pulmonary:      Effort: Pulmonary effort is normal.      Breath sounds: Normal breath sounds. No wheezing or rales.   Abdominal:      General: Bowel sounds are normal.      Palpations: Abdomen is soft.      Tenderness: There is no abdominal tenderness. " "     Hernia: A hernia (R hernia abdominal hernia, reducable, non-painful) is present.   Musculoskeletal:         General: Normal range of motion.      Cervical back: Normal range of motion and neck supple.      Right lower leg: No edema.      Left lower leg: No edema.   Skin:     General: Skin is warm and dry.   Neurological:      General: No focal deficit present.      Mental Status: She is alert and oriented to person, place, and time.   Psychiatric:         Mood and Affect: Mood normal.         Behavior: Behavior normal.     Consults:   Consults (From admission, onward)        Status Ordering Provider     Inpatient consult to Midline team  Once        Provider:  (Not yet assigned)    Completed JOE ANG          Significant Diagnostic Studies: Labs: CMP No results for input(s): "NA", "K", "CL", "CO2", "GLU", "BUN", "CREATININE", "CALCIUM", "PROT", "ALBUMIN", "BILITOT", "ALKPHOS", "AST", "ALT", "ANIONGAP", "ESTGFRAFRICA", "EGFRNONAA" in the last 48 hours. and CBC No results for input(s): "WBC", "HGB", "HCT", "PLT" in the last 48 hours.    Pending Diagnostic Studies:     None          Final Active Diagnoses:    Diagnosis Date Noted POA    PRINCIPAL PROBLEM:  Acute on chronic respiratory failure [J96.20] 02/10/2022 Yes    Fever [R50.9] 09/05/2023 No    Debility [R53.81] 01/29/2023 Yes    Unstable angina [I20.0] 07/16/2022 Yes    Chronic diastolic congestive heart failure [I50.32] 09/17/2019 Yes    COPD/emphysema [J44.9] 01/16/2019 Yes     Chronic    Chronic kidney disease, stage 4 (severe) [N18.4] 10/08/2014 Yes    DM type 2, controlled, with complication [E11.8] 06/03/2013 Yes    Gastroesophageal reflux disease without esophagitis [K21.9] 06/03/2013 Yes    ASCVD (arteriosclerotic cardiovascular disease) [I25.10] 06/03/2013 Yes    Hypercholesterolemia [E78.00] 04/09/2013 Yes    Essential hypertension, benign [I10] 04/09/2013 Yes      Problems Resolved During this Admission:    Diagnosis Date Noted " Date Resolved POA    Chronic respiratory failure [J96.10] 09/01/2023 09/01/2023 Unknown    CAD (coronary artery disease) [I25.10] 06/26/2014 08/28/2023 Yes     No new Assessment & Plan notes have been filed under this hospital service since the last note was generated.  Service: Cardiology      Discharged Condition: fair    Disposition: Long Term Care    Follow Up:    Patient Instructions:      Cardiac rehab phase II   Standing Status: Future Standing Exp. Date: 08/29/24     Order Specific Question Answer Comments   Department [facility] CARDIAC REHAB    Select qualifying diagnosis: Z98.61 - Coronary angioplasty status      Medications:  Reconciled Home Medications:      Medication List      START taking these medications    amLODIPine 10 MG tablet  Commonly known as: NORVASC  Take 1 tablet (10 mg total) by mouth once daily.     aspirin 81 MG Chew  Chew and swallow 1 tablet (81 mg total) by mouth once daily.     cholestyramine-aspartame 4 gram Powd  Commonly known as: QUESTRAN/PREVALITE LIGHT  Take 4 g by mouth once daily.  Replaces: CHOLESTYRAMINE LIGHT 4 gram Pwpk     metoprolol succinate 200 MG 24 hr tablet  Commonly known as: TOPROL-XL  Take 1 tablet (200 mg total) by mouth once daily.     nitroGLYCERIN 0.4 MG SL tablet  Commonly known as: NITROSTAT  Place 1 tablet (0.4 mg total) under the tongue every 5 (five) minutes as needed for Chest pain. Up to 3 doses. If chest pain is not relieved or worsens 3 to 5 minutes after 1 dose, call 911 and seek immediate emergency medical attention.  Replaces: nitroGLYCERIN 0.4 MG/DOSE TL SPRY 400 mcg/spray spray        CONTINUE taking these medications    albuterol 90 mcg/actuation inhaler  Commonly known as: VENTOLIN HFA  Inhale 2 puffs into the lungs every 6 (six) hours as needed for Wheezing or Shortness of Breath. Rescue     * albuterol-ipratropium 2.5 mg-0.5 mg/3 mL nebulizer solution  Commonly known as: DUO-NEB  Take 3 mLs by nebulization every 4 (four) hours as needed for  Wheezing or Shortness of Breath. Rescue     * CombiVENT RESPIMAT  mcg/actuation inhaler  Generic drug: ipratropium-albuteroL  Inhale 2 puffs into the lungs every 4 (four) hours as needed for Shortness of Breath. Rescue     allopurinoL 100 MG tablet  Commonly known as: ZYLOPRIM  Take 0.5 tablets (50 mg total) by mouth once daily.     ALPRAZolam 0.25 MG tablet  Commonly known as: XANAX  Take 1 tablet (0.25 mg total) by mouth every evening.     atorvastatin 40 MG tablet  Commonly known as: LIPITOR  Take 1 tablet (40 mg total) by mouth once daily.     clopidogreL 75 mg tablet  Commonly known as: PLAVIX  Take 1 tablet (75 mg total) by mouth once daily.     coenzyme Q10 100 mg capsule  Take 100 mg by mouth every morning.     ergocalciferol 50,000 unit Cap  Commonly known as: VITAMIN D2  Take 1 capsule (50,000 Units total) by mouth every 7 days.     ferrous sulfate 325 (65 FE) MG EC tablet  Take 325 mg by mouth once daily.     fish oil-omega-3 fatty acids 300-1,000 mg capsule  Take 2 capsules by mouth every morning.     fluticasone-salmeterol 250-50 mcg/dose 250-50 mcg/dose diskus inhaler  Commonly known as: ADVAIR  Inhale 1 puff into the lungs 2 (two) times a day.     furosemide 20 MG tablet  Commonly known as: LASIX  Take 1 tablet (20 mg total) by mouth every other day. TAKE WITH POTASSIUM     INCRUSE ELLIPTA 62.5 mcg/actuation inhalation capsule  Generic drug: umeclidinium  Inhale 62.5 mcg into the lungs every morning. Controller     ketoconazole 2 % cream  Commonly known as: NIZORAL  Apply 1 application  topically 2 (two) times daily.     magnesium oxide 400 mg (241.3 mg magnesium) tablet  Commonly known as: MAG-OX  Take 1 tablet by mouth 2 (two) times daily.     mupirocin 2 % ointment  Commonly known as: BACTROBAN  Apply topically 2 (two) times daily.     pantoprazole 40 MG tablet  Commonly known as: PROTONIX  Take 1 tablet (40 mg total) by mouth once daily.     potassium chloride 10 MEQ Cpsr  Commonly known as:  MICRO-K  Take 1 capsule (10 mEq total) by mouth every other day. (TAKE WITH LASIX/FUROSEMIDE)     PRESERVISION AREDS-2 ORAL  Take 1 tablet by mouth once daily.     triamcinolone acetonide 0.1% 0.1 % cream  Commonly known as: KENALOG  Apply 1 g topically 2 (two) times daily.     VITAMIN C 500 MG tablet  Generic drug: ascorbic acid (vitamin C)  Take 500 mg by mouth 2 (two) times daily.         * This list has 2 medication(s) that are the same as other medications prescribed for you. Read the directions carefully, and ask your doctor or other care provider to review them with you.            STOP taking these medications    CHOLESTYRAMINE LIGHT 4 gram Pwpk  Generic drug: cholestyramine-aspartame  Replaced by: cholestyramine-aspartame 4 gram Powd     colchicine (gout) 0.6 mg tablet  Commonly known as: COLCRYS     isosorbide mononitrate 30 MG 24 hr tablet  Commonly known as: IMDUR     metoprolol tartrate 75 mg Tab     nitroGLYCERIN 0.4 MG/DOSE TL SPRY 400 mcg/spray spray  Commonly known as: NITROLINGUAL  Replaced by: nitroGLYCERIN 0.4 MG SL tablet     ranolazine 500 MG Tb12  Commonly known as: RANEXA            Time spent on the discharge of patient: 35 minutes    Farhan Berry DO  Cardiology  [facility] - Cardiology Stepdown

## 2023-10-06 NOTE — DISCHARGE INSTRUCTIONS
PT comes with RUQ abdominal pain. had colon with Nataliia in 2/2021 noting tics and diverticulosis. she says that this pain present on and off for 5 years. it is every day. it is a burning pain in the RUQ. if she presses on the RUQ it will make it worse. greasy food will make it worse. she has no nausea or vomiting. she has had lap yoselin 2006 for unclear reasons. -  11/01/2022:  Patient last seen for evaluation of right upper quadrant pain.  Right upper quadrant ultrasound showed some questionable fluid in the gallbladder fossa but was likely related to being in the pylorus.  This was followed up with a CT scan that showed no problems with the gallbladder fossa but she did have a periampullary diverticulum. -  There was some mention of fatty liver. Pt reports this pain has been ongoing since 2016. it is not all the time.  10/6/23 Pt of Dr Mariano More I am seeing on a urgent basis.  Has seen Dr Juares for her last several visits Here for persistent RUQ pain for the past several years. It has been extensively investigagted. EGD 2016 with DR William unremarkable including duodenal and esophageal bx, colonoscopy in 2021 unremarkable except fot tics, 2022 US with fluid around GB fossa, CT 9/2022 mild hepatic steatosis with tics.  Pain is intermittent - usually feels like a throbbing ache as soon as she eats, it feels aggravating and ache like, will last for several mins, sugary and greasy foods make it worse. Some nausea, no vomiting.  BMs are normal and daily. She has some HB. She is not on a PPI.  Used to be on Omeprazole many years ago. Reviewed and report call to Barbara

## 2023-10-11 ENCOUNTER — TELEPHONE (OUTPATIENT)
Dept: FAMILY MEDICINE | Facility: CLINIC | Age: 78
End: 2023-10-11

## 2023-10-11 NOTE — TELEPHONE ENCOUNTER
The below reminder came up today. Patient had CMP and CBC 9/5 for another provider, looks like she was in the hospital. Does Dr Dwyer want these again, or just Ferritin?

## 2023-10-11 NOTE — TELEPHONE ENCOUNTER
----- [facility] 7/17/2023  2:39 PM CDT -----  Regarding: Lab due  ----- Message from Khadar Dwyer MD sent at 7/16/2023  7:34 PM CDT -----  Call patient's caregiver.  CRP and sed rate have improved.  Kidney function is slightly weaker than before.  Liver looks good.  Blood sugar under good control with an  A1c of 4.6.  Hematocrit is down to 30.4 showing more anemia.  Would recommend she take an iron pill once a day Vitron-C.  Increase her water intake if possible.  Recheck CBC BMP ferritin in 3 months

## 2023-10-13 NOTE — H&P
"[facility] Medicine  History & Physical    DOS: 6/29/2022  11:40 PM    Patient Name: Marisol Kerr  MRN: [id number]  Patient Class: OP- Observation  Admission Date: 6/29/2022  Attending Physician: Dr. Lopez  Primary Care Provider: Khadar Dwyer MD         Patient information was obtained from patient, past medical records and ER records.     Subjective:     Principal Problem:Chest pain    Chief Complaint:   Chief Complaint   Patient presents with    Chest Pain     Patient stated, "I had chest pain and hour ago and I used 2 sprays of nitro and then the pain came back 30 minutes later and I did 2 more sprays and it didn't help."        HPI: Ms. 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 It is severe. Chest pain occurred at rest, was substernal, did not radiate, and was associated with palpitations, nausea, sweats, increased SOB, and lightheadedness. She denies fever, chills, V/D, cough, or LOC. She took 2 sprays of NTG 5 min apart without relief and called EMS. She took 2 more sprays with NTG with EMS with improvement. She is currently sleeping on my arrival in the room and chest pain free. EKG is NSR with no acute ischemic changes. Initial troponin is 0.03.      She had left heart catheterization 2/2022 severe triple-vessel disease and had complication with rectus sheet hematoma/bleeding and transferred to [facility] [place] intervention and embolization was done recently and admitted to rehab there and got discharged recently.    She was admitted in April and again transferred to [facility] for high risk PCI, but developed COPD exac and this was deferred. She states that she was told she was too high risk, they are not planning on doing an angiogram for high risk PCI, and she will be medically managed. " Chief complaint:   Chief Complaint   Patient presents with   • Foot Injury     41   • Worker's Compensation       Vitals:  Visit Vitals  /73 (BP Location: RUE - Right upper extremity, Patient Position: Sitting, Cuff Size: Large Adult)   Pulse 83   Temp 98.1 °F (36.7 °C) (Oral)   Resp 16   Ht 5' 6\" (1.676 m)   Wt 89.7 kg (197 lb 12 oz)   LMP 10/12/2023 (Approximate)   SpO2 97%   BMI 31.92 kg/m²       HISTORY OF PRESENT ILLNESS     HPI     Taylor Garza is a 51yo F who presents to  today for evaluation after a fall. She was at work when she slipped, landing on her bottom. This occurred at 0830. She works in a . She required assistance getting up. She has been limping around on it all day. She has not taken anything for pain as she hasn't eaten yet today. She complains of pain in her L foot and R upper leg. She denies any numbness or weakness. No other injuries.    Other significant problems:  Patient Active Problem List    Diagnosis Date Noted   • Health maintenance examination 12/23/2018     Priority: Medium     12/2018  Tdap: 2016  Flu vaccine obtained.  Pap smear: 3/2016, negative, also negative for high risk HPV  Mammogram: 10/2020, birads 1, with simeon  Cscope: 2008, normal, repeat in 10 years     • Viral syndrome 11/01/2019     Priority: Low   • Intractable chronic migraine without aura and with status migrainosus 09/23/2019     Priority: Low   • Back spasm 07/02/2019     Priority: Low   • Encounter for physical examination related to employment 12/23/2018     Priority: Low   • Obesity (BMI 30-39.9) 12/23/2018     Priority: Low   • Chronic daily headache 04/13/2016     Priority: Low   • Secondary amenorrhea 03/07/2016     Priority: Low   • Multiple thyroid nodules 07/09/2015     Priority: Low   • Primary hypothyroidism 07/09/2015     Priority: Low   • Retention of urine, unspecified 04/22/2010     Priority: Low   • Sciatica 03/16/2010     Priority: Low   • Elevated blood pressure reading        Past Medical History:   Diagnosis Date    CAD (coronary artery disease)     Cancer     colon    CHF (congestive heart failure)     Colitis     Colon cancer     COPD (chronic obstructive pulmonary disease)     Decreased hearing     Diabetes mellitus     Diabetes mellitus, type 2     Hypercholesterolemia     Hypertension     Hypoxemia     Insomnia     Obesity     Osteoarthritis        Past Surgical History:   Procedure Laterality Date    ANGIOGRAM, CORONARY, WITH LEFT HEART CATHETERIZATION N/A 2/10/2022    Procedure: Angiogram, Coronary, with Left Heart Cath;  Surgeon: Cristian Benjamin MD;  Location: [facility] CATH/EP LAB;  Service: Cardiology;  Laterality: N/A;    CARDIAC CATHETERIZATION      CHOLECYSTECTOMY      COLECTOMY      CORONARY ANGIOPLASTY      CORONARY STENT PLACEMENT      EXTERNAL EAR SURGERY      EYE SURGERY      FLEXIBLE SIGMOIDOSCOPY N/A 9/19/2019    Procedure: SIGMOIDOSCOPY, FLEXIBLE;  Surgeon: Biju Kramer III, MD;  Location: [facility] ENDO;  Service: Endoscopy;  Laterality: N/A;    HYSTERECTOMY      partial       Review of patient's allergies indicates:   Allergen Reactions    Iodinated contrast media     Strawberries [strawberry] Hives and Itching       No current facility-administered medications on file prior to encounter.     Current Outpatient Medications on File Prior to Encounter   Medication Sig    aspirin (ECOTRIN) 81 MG EC tablet Take 1 tablet (81 mg total) by mouth every morning.    atorvastatin (LIPITOR) 80 MG tablet Take 1 tablet (80 mg total) by mouth once daily. (Patient taking differently: Take 80 mg by mouth every evening.)    cholestyramine (QUESTRAN) 4 gram packet Take 1 packet (4 g total) by mouth 2 (two) times daily.    coenzyme Q10 100 mg capsule Take 100 mg by mouth every morning.    colchicine (COLCRYS) 0.6 mg tablet Take 1/2 tablet by mouth every other day (Patient taking differently: Take 0.6 mg by mouth every other day. Take 1/2 tablet  without diagnosis of hypertension 01/22/2008     Priority: Low   • Cellulitis and abscess of face 11/22/2002     Priority: Low       PAST MEDICAL, FAMILY AND SOCIAL HISTORY     Medications:  Current Outpatient Medications   Medication Sig Dispense Refill   • ondansetron (ZOFRAN) 4 MG tablet Take 1 tablet by mouth every 8 hours as needed for Nausea. 12 tablet 0   • levothyroxine 50 MCG tablet TAKE ONE TABLET BY MOUTH ONCE DAILY AND TAKE AN ADDITIONAL TABLET ON SUNDAYS AND WEDNESDAYS. 117 tablet 1   • betamethasone valerate (VALISONE) 0.1 % cream Apply thin layer to affected areas on leg bid prn rash. Don't use on face. Don't use on normal skin. 15 g 0   • Acetylcysteine (NAC) 600 MG Cap 3t po qhs 90 capsule 6   • topiramate (Topamax) 50 MG tablet Take 1 tablet by mouth in the morning and 1 tablet in the evening. 60 tablet 1   • SUMAtriptan (IMITREX) 50 MG tablet Take 1 tablet by mouth at onset of migraine. May repeat after 2 hours if needed. Max dose=200mg/24hrs 10 tablet 1   • vitamin D3 (CHOLECALCIFEROL) 1.25 mg (50,000 units) capsule Take 1 capsule by mouth 1 day a week. 4 capsule 5   • spironolactone (ALDACTONE) 50 MG tablet Take 3 tablets by mouth at bedtime. Monitor for dizziness. 90 tablet 5   • mupirocin (BACTROBAN) 2 % ointment Apply thin layer to scabbed areas on skin bid for 3 weeks 22 g 0   • triamcinolone (ARISTOCORT) 0.1 % cream Apply topically 3 times daily as needed (itching). 30 g 0   • medroxyPROGESTERone (PROVERA) 10 MG tablet Take 1 tablet by mouth daily. 10 tablet 11   • clotrimazole-betamethasone (Lotrisone) 1-0.05 % cream Apply 1 application topically 2 times daily. 45 g 1   • NYSTATIN, TOPICAL, Powder Apply to affected area BID. 15 g 2     No current facility-administered medications for this visit.       Allergies:  ALLERGIES:  No Known Allergies    Past Medical  History/Surgeries:  Past Medical History:   Diagnosis Date   • Colon polyp 10/31/2022    Dr. Nettles, Tubular adenoma, recall 1  by mouth every other day)    diltiaZEM (CARDIZEM CD) 120 MG Cp24 Take 1 capsule (120 mg total) by mouth once daily.    fish oil-omega-3 fatty acids 300-1,000 mg capsule Take 2 capsules by mouth every morning.    gabapentin (NEURONTIN) 100 MG capsule Take 1 capsule (100 mg total) by mouth every evening.    metoprolol succinate (TOPROL-XL) 50 MG 24 hr tablet Take 1 tablet (50 mg total) by mouth once daily.    pantoprazole (PROTONIX) 40 MG tablet Take 1 tablet (40 mg total) by mouth once daily.    ticagrelor (BRILINTA) 90 mg tablet Take 1 tablet (90 mg total) by mouth 2 (two) times a day.    torsemide (DEMADEX) 10 MG Tab Take 1 tablet (10 mg total) by mouth once daily.    umeclidinium (INCRUSE ELLIPTA) 62.5 mcg/actuation inhalation capsule Inhale 62.5 mcg into the lungs every morning. Controller    vit C/E/Zn/coppr/lutein/zeaxan (PRESERVISION AREDS-2 ORAL) Take 1 tablet by mouth once daily.    acetaminophen (TYLENOL) 325 MG tablet Take 2 tablets (650 mg total) by mouth every 4 (four) hours as needed.    albuterol (VENTOLIN HFA) 90 mcg/actuation inhaler Inhale 2 puffs into the lungs every 6 (six) hours as needed for Wheezing or Shortness of Breath. Rescue    ergocalciferol (VITAMIN D2) 50,000 unit Cap Take 1 capsule (50,000 Units total) by mouth every 7 days. (Patient taking differently: Take 50,000 Units by mouth every Monday.)    fluticasone-salmeterol diskus inhaler 250-50 mcg Inhale 1 puff into the lungs 2 (two) times daily.    ipratropium-albuteroL (COMBIVENT RESPIMAT)  mcg/actuation inhaler Inhale 2 puffs into the lungs every 4 (four) hours as needed for Shortness of Breath. Rescue    isosorbide mononitrate (IMDUR) 60 MG 24 hr tablet Take 1 tablet (60 mg total) by mouth once daily.    [DISCONTINUED] amLODIPine (NORVASC) 10 MG tablet Take 1 tablet (10 mg total) by mouth once daily. (Patient taking differently: No sig reported)    [DISCONTINUED] benazepriL (LOTENSIN) 40 MG tablet Take 1 tablet  year (poor prep)   • Hashimoto's thyroiditis 2014   • History of colitis 09/02/2008   • Left ovarian cyst    • Migraine    • Sciatica 2010    L4-L5 disc herniation   • Thyroid nodule        Past Surgical History:   Procedure Laterality Date   • Colonoscopy  09/02/2008    Dr. Corrales, no path. f/u 10 years.   • Colonoscopy  10/31/2022    Dr. Nettles, tubular adenoma, recall 1 year (poor prep)   • Foot surgery Right 06/08/2021    RIGHT SECOND METATARSOPHALANGEAL JOINT DEBRIDEMENT AND METATARSAL SHORTENING OSTEOTOMY by Dr. Bentley at [address] [place] [address]   • Inguinal hernia repair Bilateral    • Lumbar epidural injection  04/14/2010   • Pelvic laparoscopy  2008    removal of scar tissue   • Reopen fallopian tube,chromotubation  04/20/2010   • Us guide thyroid fna aspiration  2014    nodule       Family History:  Family History   Problem Relation Age of Onset   • Cancer Father         Prostate CA, in remission, in his late 60's   • Cancer, Prostate Father 69   • Dementia/Alzheimers Maternal Grandmother    • Patient is unaware of any medical problems Maternal Grandfather    • Patient is unaware of any medical problems Paternal Grandmother    • Patient is unaware of any medical problems Paternal Grandfather    • NEGATIVE FAMILY HX OF Other         HTN, DM, CAD, CA       Social History:  Social History     Tobacco Use   • Smoking status: Never     Passive exposure: Never   • Smokeless tobacco: Never   • Tobacco comments:     nonsmoker   Substance Use Topics   • Alcohol use: No     Alcohol/week: 0.0 standard drinks of alcohol     Comment: nondrinker       REVIEW OF SYSTEMS     Review of Systems   Constitutional: Negative for activity change, chills, fatigue and fever.   HENT: Negative for congestion, postnasal drip and sore throat.    Eyes: Negative for photophobia and visual disturbance.   Respiratory: Negative for cough and shortness of breath.    Cardiovascular: Negative for chest pain and leg swelling.    Gastrointestinal: Negative for abdominal pain, constipation, diarrhea, nausea and vomiting.   Genitourinary: Negative for difficulty urinating, frequency and urgency.   Musculoskeletal: Positive for arthralgias. Negative for back pain and neck pain.   Skin: Negative for color change and rash.   Neurological: Negative for dizziness, syncope, light-headedness and headaches.   Hematological: Negative for adenopathy.   Psychiatric/Behavioral: Negative for confusion.       PHYSICAL EXAM     Physical Exam  Constitutional:       General: She is not in acute distress.     Appearance: Normal appearance. She is well-developed. She is not ill-appearing, toxic-appearing or diaphoretic.      Comments: tearful   HENT:      Head: Normocephalic and atraumatic.      Mouth/Throat:      Mouth: Mucous membranes are moist.      Neck: Normal range of motion.   Eyes:      Conjunctiva/sclera: Conjunctivae normal.   Cardiovascular:      Pulses:           Dorsalis pedis pulses are 2+ on the right side and 2+ on the left side.   Pulmonary:      Effort: Pulmonary effort is normal.   Musculoskeletal:      Right upper leg: Tenderness (anterior thigh) present. No swelling or bony tenderness.      Right knee: Normal.      Left ankle: Normal.      Right foot: Normal.      Left foot: Normal capillary refill. Tenderness and bony tenderness (medial talus) present. No swelling.   Skin:     General: Skin is warm and dry.   Neurological:      Mental Status: She is alert and oriented to person, place, and time.   Psychiatric:         Behavior: Behavior is cooperative.         ASSESSMENT/PLAN     1. Fall, initial encounter  - XR FOOT 3 OR MORE VIEWS LEFT; Future  - XR FEMUR 2 OR MORE VIEWS RIGHT; Future  - ibuprofen (MOTRIN) tablet 600 mg  - POST OP SHOE RIGID  - SERVICE TO OCCUPATIONAL MEDICINE    2. Left foot pain  - SERVICE TO OCCUPATIONAL MEDICINE    3. Right leg pain  - SERVICE TO OCCUPATIONAL MEDICINE    XR FEMUR 2 OR MORE VIEWS RIGHT    Result  (40 mg total) by mouth once daily.    [DISCONTINUED] cimetidine (TAGAMET) 300 MG tablet Take 1 tablet (300 mg total) by mouth every 6 (six) hours. Take 1 tablet (300 mg total) by mouth starting at 6 PM on 2022 (the evening before your angiogram procedure). Then take 1 tablet at 12 AM, then take 1 tablet at 6 AM on .    [DISCONTINUED] furosemide (LASIX) 20 MG tablet Take 2 tablets (40 mg total) by mouth once daily.    [DISCONTINUED] lisinopriL 10 MG tablet Take 1 tablet (10 mg total) by mouth once daily. HOLD UNTIL OTHERWISE DIRECTED BY PRIMARY CARE PROVIDER    [DISCONTINUED] lovastatin (MEVACOR) 40 MG tablet Take 1 tablet (40 mg total) by mouth every other day.     Family History       Problem Relation (Age of Onset)    Febrile seizures Mother    Heart failure Father          Tobacco Use    Smoking status: Former Smoker     Packs/day: 3.00     Years: 50.00     Pack years: 150.00     Types: Cigarettes     Start date: 3/30/1964     Quit date: 2006     Years since quittin.3    Smokeless tobacco: Never Used    Tobacco comment: ex smoker 15 years   Substance and Sexual Activity    Alcohol use: Yes    Drug use: No    Sexual activity: Never     Review of Systems   Constitutional:  Positive for diaphoresis and fatigue. Negative for activity change, appetite change, chills, fever and unexpected weight change.   HENT:  Negative for congestion, ear pain, facial swelling, hearing loss, sore throat and trouble swallowing.    Eyes:  Negative for pain and discharge.   Respiratory:  Positive for shortness of breath. Negative for cough, chest tightness and wheezing.    Cardiovascular:  Positive for chest pain and palpitations. Negative for leg swelling.   Gastrointestinal:  Positive for nausea. Negative for abdominal pain, blood in stool, diarrhea and vomiting.   Endocrine: Negative for polydipsia, polyphagia and polyuria.   Genitourinary:  Negative for difficulty urinating,  Date: 10/13/2023  Narrative: XR FEMUR 2 OR MORE VIEWS RIGHT HISTORY: Unspecified fall, initial encounter COMPARISON: None. FINDINGS: Bones show normal mineralization and alignment. No fracture, subluxation or dislocation. No bony erosion or sclerosis. Soft tissues are unremarkable. Joint spaces are preserved.     Impression: IMPRESSION: No acute radiographic abnormality in right femur. Electronically Signed by: Jason Acevedo MD Signed on: 10/13/2023 4:30 PM Created on Workstation ID: [id number] Signed on Workstation ID: [id number]    XR FOOT 3 OR MORE VIEWS LEFT    Result Date: 10/13/2023  Narrative: XR FOOT 3 OR MORE VIEWS LEFT HISTORY: Unspecified fall, initial encounter COMPARISON: None. FINDINGS: Bones show normal mineralization and alignment. No fracture, subluxation or dislocation. No bony erosion or sclerosis. Soft tissues are unremarkable. Joint spaces are preserved.     Impression: IMPRESSION: No acute radiographic abnormality in left foot. Electronically Signed by: Jason Acevedo MD Signed on: 10/13/2023 4:29 PM Created on Workstation ID: [id number] Signed on Workstation ID: [id number]    Taylor Garza was seen in  today for evaluation of foot and leg pain. Vitals are normal, afebrile. CMS is intact throughout the LE. Tenderness over R femur and L talus. XR obtained showing no acute fracture. Given postop shoe. Instructed to ice and elevate. Rest as much as possible. Use tylenol and ibuprofen as needed for pain. Gentle ROM exercises. Follow up with occupational health. ER precautions reviewed. All questions and concerns were addressed.        Supervising physician: Dr. Jang   dysuria, flank pain, frequency and urgency.   Musculoskeletal:  Negative for arthralgias, back pain, joint swelling, neck pain and neck stiffness.   Skin:  Negative for rash and wound.   Allergic/Immunologic: Negative for environmental allergies and immunocompromised state.   Neurological:  Positive for dizziness and light-headedness. Negative for seizures, syncope, speech difficulty, weakness, numbness and headaches.   Hematological:  Negative for adenopathy.   Psychiatric/Behavioral:  Negative for sleep disturbance and suicidal ideas. The patient is not nervous/anxious.    All other systems reviewed and are negative.  Objective:     Vital Signs (Most Recent):  Temp: 97.7 °F (36.5 °C) (06/30/22 0134)  Pulse: 86 (06/30/22 0134)  Resp: (!) 21 (06/29/22 2231)  BP: (!) 185/80 (06/30/22 0134)  SpO2: 99 % (06/30/22 0134)   Vital Signs (24h Range):  Temp:  [97.7 °F (36.5 °C)] 97.7 °F (36.5 °C)  Pulse:  [86-91] 86  Resp:  [15-21] 21  SpO2:  [98 %-100 %] 99 %  BP: (139-185)/(64-80) 185/80     Weight: 81.9 kg (180 lb 8.9 oz)  Body mass index is 35.26 kg/m².    Physical Exam  Vitals and nursing note reviewed.   Constitutional:       Appearance: Normal appearance.   HENT:      Head: Normocephalic and atraumatic.      Nose: Nose normal.      Mouth/Throat:      Mouth: Mucous membranes are moist.      Pharynx: Oropharynx is clear.   Eyes:      Extraocular Movements: Extraocular movements intact.      Pupils: Pupils are equal, round, and reactive to light.   Cardiovascular:      Rate and Rhythm: Normal rate and regular rhythm.      Pulses: Normal pulses.   Pulmonary:      Effort: Pulmonary effort is normal.      Comments: Diminished in bases   Abdominal:      General: Bowel sounds are normal.      Palpations: Abdomen is soft.   Musculoskeletal:         General: Normal range of motion.      Cervical back: Normal range of motion and neck supple.      Right lower leg: Edema present.      Left lower leg: Edema present.      Comments:  Bilat LE edema 2+ with bilat venous stasis dermatitis    Skin:     General: Skin is warm and dry.      Capillary Refill: Capillary refill takes less than 2 seconds.   Neurological:      General: No focal deficit present.      Mental Status: She is alert and oriented to person, place, and time.   Psychiatric:         Mood and Affect: Mood normal.         Behavior: Behavior normal.         CRANIAL NERVES     CN III, IV, VI   Pupils are equal, round, and reactive to light.     Significant Labs: All pertinent labs within the past 24 hours have been reviewed.  CBC:   Recent Labs   Lab 06/29/22 2157   WBC 8.51   HGB 10.6*   HCT 34.8*        CMP:   Recent Labs   Lab 06/29/22 2159      K 4.5      CO2 31*   *   BUN 33*   CREATININE 1.5*   CALCIUM 9.0   PROT 6.3   ALBUMIN 3.2*   BILITOT 0.6   ALKPHOS 69   AST 25   ALT 17   ANIONGAP 7*   EGFRNONAA 33.8*     Cardiac Markers:   Recent Labs   Lab 06/29/22 2159   *     Troponin:   Recent Labs   Lab 06/29/22 2159   TROPONINI 0.030       Significant Imaging: I have reviewed all pertinent imaging results/findings within the past 24 hours.  EKG: I have reviewed all pertinent results/findings within the past 24 hours and my personal findings are: NSR, no acute ischemic changes  CXR awaiting official radiologist interpretation, with bilat lower lobe patchy opacities     Assessment/Plan:     * Chest pain  Admit to cardiology B  Trend troponin - first is negative  ASA/brilinta  1 time dose of wt based lovenox   Continue imdur   Consult cardiology given known multivessel CAD not amendable to intervention or CABG - personally spoke with Dr. FRANCK Langley who agreed to follow here   SL NTG PRN  Continue home meds  EKG prn chest pain         Multiple chronic diseases  Continue appropriate home meds      Acute on chronic diastolic heart failure  Daily wt  Accurate I&O  1 time dose of IV lasix  Continue home toresemide in AM        CKD (chronic kidney disease),  stage III  At baseline  Daily chemistries to monitor         VTE Risk Mitigation (From admission, onward)         Ordered     IP VTE HIGH RISK PATIENT  Once         06/30/22 0029     Place sequential compression device  Until discontinued         06/30/22 0029     enoxaparin injection 1 mg/kg (Dosing Weight)  Once         06/29/22 0076                   Yennifer Yang NP  Department of Hospital Medicine   [facility]

## 2023-11-02 ENCOUNTER — TELEPHONE (OUTPATIENT)
Dept: FAMILY MEDICINE | Facility: CLINIC | Age: 78
End: 2023-11-02

## 2023-11-02 NOTE — TELEPHONE ENCOUNTER
"----- Message from RT Sae sent at 10/12/2023  8:53 AM CDT -----  Regarding: FW: Lab due  10/11/2023 Per Dr. Dwyer "lets get cbc, ferritin, cmp when patient gets out of rehab later this month."       ----- Message -----  From: Ramona Salcido RT  Sent: 10/10/2023  12:00 AM CDT  To: RT Sae  Subject: Lab due                                          ----- Message from Khadar Dwyer MD sent at 7/16/2023  7:34 PM CDT -----  Call patient's caregiver.  CRP and sed rate have improved.  Kidney function is slightly weaker than before.  Liver looks good.  Blood sugar under good control with an  A1c of 4.6.  Hematocrit is down to 30.4 showing more anemia.  Would recommend she take an iron pill once a day Vitron-C.  Increase her water intake if possible.  Recheck CBC BMP ferritin in 3 months      "

## 2023-11-02 NOTE — TELEPHONE ENCOUNTER
Spoke to daughter, Marisol, that lab is due to recheck blood counts and iron. Said Ms Marisol is on hospice now. FYI to Dr Dwyer. I marked reminder complete.

## 2023-11-04 ENCOUNTER — HOSPITAL ENCOUNTER (INPATIENT)
Facility: HOSPITAL | Age: 78
LOS: 9 days | Discharge: NURSING, REHAB OR LONG-TERM CARE | DRG: 166 | End: 2023-11-13
Attending: EMERGENCY MEDICINE | Admitting: FAMILY MEDICINE
Payer: MEDICARE

## 2023-11-04 DIAGNOSIS — R09.02 HYPOXEMIA: ICD-10-CM

## 2023-11-04 DIAGNOSIS — R07.9 CHEST PAIN: ICD-10-CM

## 2023-11-04 DIAGNOSIS — L89.152 SACRAL DECUBITUS ULCER, STAGE II: ICD-10-CM

## 2023-11-04 DIAGNOSIS — S80.00XA KNEE CONTUSION: ICD-10-CM

## 2023-11-04 DIAGNOSIS — R06.02 SHORTNESS OF BREATH: ICD-10-CM

## 2023-11-04 DIAGNOSIS — I49.9 ARRHYTHMIA: ICD-10-CM

## 2023-11-04 DIAGNOSIS — L89.95 PRESSURE INJURY, UNSTAGEABLE, UNSPECIFIED LOCATION: ICD-10-CM

## 2023-11-04 DIAGNOSIS — J96.01 ACUTE HYPOXEMIC RESPIRATORY FAILURE: ICD-10-CM

## 2023-11-04 DIAGNOSIS — J96.01 ACUTE RESPIRATORY FAILURE WITH HYPOXIA AND HYPERCARBIA: Primary | ICD-10-CM

## 2023-11-04 DIAGNOSIS — R41.82 ALTERED MENTAL STATUS: ICD-10-CM

## 2023-11-04 DIAGNOSIS — J96.02 ACUTE RESPIRATORY FAILURE WITH HYPOXIA AND HYPERCARBIA: Primary | ICD-10-CM

## 2023-11-04 LAB
ALBUMIN SERPL BCP-MCNC: 3.6 G/DL (ref 3.5–5.2)
ALLENS TEST: ABNORMAL
ALP SERPL-CCNC: 85 U/L (ref 55–135)
ALT SERPL W/O P-5'-P-CCNC: 11 U/L (ref 10–44)
ANION GAP SERPL CALC-SCNC: 0 MMOL/L (ref 8–16)
ANION GAP SERPL CALC-SCNC: 6 MMOL/L (ref 8–16)
ANION GAP SERPL CALC-SCNC: 7 MMOL/L (ref 8–16)
ANION GAP SERPL CALC-SCNC: 8 MMOL/L (ref 8–16)
AST SERPL-CCNC: 14 U/L (ref 10–40)
BACTERIA #/AREA URNS HPF: NEGATIVE /HPF
BASOPHILS # BLD AUTO: 0.01 K/UL (ref 0–0.2)
BASOPHILS # BLD AUTO: ABNORMAL K/UL (ref 0–0.2)
BASOPHILS NFR BLD: 0 % (ref 0–1.9)
BASOPHILS NFR BLD: 0.1 % (ref 0–1.9)
BILIRUB SERPL-MCNC: 0.2 MG/DL (ref 0.1–1)
BILIRUB UR QL STRIP: NEGATIVE
BNP SERPL-MCNC: 1995 PG/ML (ref 0–99)
BUN SERPL-MCNC: 38 MG/DL (ref 8–23)
BUN SERPL-MCNC: 40 MG/DL (ref 8–23)
BUN SERPL-MCNC: 41 MG/DL (ref 8–23)
BUN SERPL-MCNC: 41 MG/DL (ref 8–23)
CALCIUM SERPL-MCNC: 8.6 MG/DL (ref 8.7–10.5)
CALCIUM SERPL-MCNC: 8.6 MG/DL (ref 8.7–10.5)
CALCIUM SERPL-MCNC: 8.7 MG/DL (ref 8.7–10.5)
CALCIUM SERPL-MCNC: 9 MG/DL (ref 8.7–10.5)
CHLORIDE SERPL-SCNC: 101 MMOL/L (ref 95–110)
CHLORIDE SERPL-SCNC: 102 MMOL/L (ref 95–110)
CHLORIDE SERPL-SCNC: 104 MMOL/L (ref 95–110)
CHLORIDE SERPL-SCNC: 105 MMOL/L (ref 95–110)
CLARITY UR: ABNORMAL
CO2 SERPL-SCNC: 34 MMOL/L (ref 23–29)
CO2 SERPL-SCNC: 35 MMOL/L (ref 23–29)
CO2 SERPL-SCNC: 35 MMOL/L (ref 23–29)
CO2 SERPL-SCNC: 36 MMOL/L (ref 23–29)
COLOR UR: YELLOW
CREAT SERPL-MCNC: 1.6 MG/DL (ref 0.5–1.4)
CREAT SERPL-MCNC: 1.7 MG/DL (ref 0.5–1.4)
D DIMER PPP IA.FEU-MCNC: 2.94 MG/L FEU (ref 0–0.49)
DELSYS: ABNORMAL
DIFFERENTIAL METHOD: ABNORMAL
DIFFERENTIAL METHOD: ABNORMAL
EOSINOPHIL # BLD AUTO: 0 K/UL (ref 0–0.5)
EOSINOPHIL # BLD AUTO: ABNORMAL K/UL (ref 0–0.5)
EOSINOPHIL NFR BLD: 0 % (ref 0–8)
EOSINOPHIL NFR BLD: 0 % (ref 0–8)
ERYTHROCYTE [DISTWIDTH] IN BLOOD BY AUTOMATED COUNT: 14.4 % (ref 11.5–14.5)
ERYTHROCYTE [DISTWIDTH] IN BLOOD BY AUTOMATED COUNT: 14.5 % (ref 11.5–14.5)
ERYTHROCYTE [SEDIMENTATION RATE] IN BLOOD BY WESTERGREN METHOD: 18 MM/H
ERYTHROCYTE [SEDIMENTATION RATE] IN BLOOD BY WESTERGREN METHOD: 28 MM/H
EST. GFR  (NO RACE VARIABLE): 33.2 ML/MIN/1.73 M^2
FIO2: 100
FIO2: 40
GLUCOSE SERPL-MCNC: 123 MG/DL (ref 70–110)
GLUCOSE SERPL-MCNC: 155 MG/DL (ref 70–110)
GLUCOSE SERPL-MCNC: 158 MG/DL (ref 70–110)
GLUCOSE SERPL-MCNC: 216 MG/DL (ref 70–110)
GLUCOSE SERPL-MCNC: 219 MG/DL (ref 70–110)
GLUCOSE SERPL-MCNC: 267 MG/DL (ref 70–110)
GLUCOSE SERPL-MCNC: 276 MG/DL (ref 70–110)
GLUCOSE UR QL STRIP: NEGATIVE
HCO3 UR-SCNC: 32.9 MMOL/L (ref 24–28)
HCO3 UR-SCNC: 35.7 MMOL/L (ref 24–28)
HCO3 UR-SCNC: 36.1 MMOL/L (ref 24–28)
HCO3 UR-SCNC: 36.9 MMOL/L (ref 24–28)
HCT VFR BLD AUTO: 27.4 % (ref 37–48.5)
HCT VFR BLD AUTO: 35.4 % (ref 37–48.5)
HCT VFR BLD CALC: 33 %PCV (ref 36–54)
HGB BLD-MCNC: 7.8 G/DL (ref 12–16)
HGB BLD-MCNC: 9.8 G/DL (ref 12–16)
HGB UR QL STRIP: NEGATIVE
HYALINE CASTS #/AREA URNS LPF: 27 /LPF
HYPOCHROMIA BLD QL SMEAR: ABNORMAL
IMM GRANULOCYTES # BLD AUTO: 0.06 K/UL (ref 0–0.04)
IMM GRANULOCYTES # BLD AUTO: ABNORMAL K/UL (ref 0–0.04)
IMM GRANULOCYTES NFR BLD AUTO: 0.6 % (ref 0–0.5)
IMM GRANULOCYTES NFR BLD AUTO: ABNORMAL % (ref 0–0.5)
INFLUENZA A, MOLECULAR: NEGATIVE
INFLUENZA B, MOLECULAR: NEGATIVE
KETONES UR QL STRIP: NEGATIVE
LACTATE SERPL-SCNC: 0.5 MMOL/L (ref 0.5–1.9)
LEUKOCYTE ESTERASE UR QL STRIP: NEGATIVE
LYMPHOCYTES # BLD AUTO: 0.3 K/UL (ref 1–4.8)
LYMPHOCYTES # BLD AUTO: ABNORMAL K/UL (ref 1–4.8)
LYMPHOCYTES NFR BLD: 14 % (ref 18–48)
LYMPHOCYTES NFR BLD: 2.8 % (ref 18–48)
MCH RBC QN AUTO: 27.3 PG (ref 27–31)
MCH RBC QN AUTO: 27.7 PG (ref 27–31)
MCHC RBC AUTO-ENTMCNC: 27.7 G/DL (ref 32–36)
MCHC RBC AUTO-ENTMCNC: 28.5 G/DL (ref 32–36)
MCV RBC AUTO: 97 FL (ref 82–98)
MCV RBC AUTO: 99 FL (ref 82–98)
MICROSCOPIC COMMENT: ABNORMAL
MIN VOL: 10.9
MIN VOL: 6.57
MODE: ABNORMAL
MONOCYTES # BLD AUTO: 0.4 K/UL (ref 0.3–1)
MONOCYTES # BLD AUTO: ABNORMAL K/UL (ref 0.3–1)
MONOCYTES NFR BLD: 2 % (ref 4–15)
MONOCYTES NFR BLD: 4 % (ref 4–15)
MRSA SCREEN BY PCR: POSITIVE
NEUTROPHILS # BLD AUTO: 9.4 K/UL (ref 1.8–7.7)
NEUTROPHILS NFR BLD: 82 % (ref 38–73)
NEUTROPHILS NFR BLD: 92.5 % (ref 38–73)
NEUTS BAND NFR BLD MANUAL: 2 %
NITRITE UR QL STRIP: NEGATIVE
NRBC BLD-RTO: 0 /100 WBC
NRBC BLD-RTO: 0 /100 WBC
PCO2 BLDA: 105.8 MMHG (ref 35–45)
PCO2 BLDA: 63.5 MMHG (ref 35–45)
PCO2 BLDA: 65.7 MMHG (ref 35–45)
PCO2 BLDA: 73.3 MMHG (ref 35–45)
PEEP: 5
PH SMN: 7.14 [PH] (ref 7.35–7.45)
PH SMN: 7.26 [PH] (ref 7.35–7.45)
PH SMN: 7.34 [PH] (ref 7.35–7.45)
PH SMN: 7.37 [PH] (ref 7.35–7.45)
PH UR STRIP: 6 [PH] (ref 5–8)
PIP: 29
PIP: 48
PLATELET # BLD AUTO: 186 K/UL (ref 150–450)
PLATELET # BLD AUTO: 324 K/UL (ref 150–450)
PLATELET BLD QL SMEAR: ABNORMAL
PMV BLD AUTO: 11.9 FL (ref 9.2–12.9)
PMV BLD AUTO: 12.2 FL (ref 9.2–12.9)
PO2 BLDA: 41 MMHG (ref 80–100)
PO2 BLDA: 528 MMHG (ref 80–100)
PO2 BLDA: 72 MMHG (ref 80–100)
PO2 BLDA: 73 MMHG (ref 80–100)
POC BE: 10 MMOL/L
POC BE: 12 MMOL/L
POC BE: 6 MMOL/L
POC BE: 7 MMOL/L
POC IONIZED CALCIUM: 1.3 MMOL/L (ref 1.06–1.42)
POC SATURATED O2: 100 % (ref 95–100)
POC SATURATED O2: 71 % (ref 95–100)
POC SATURATED O2: 91 % (ref 95–100)
POC SATURATED O2: 93 % (ref 95–100)
POC TCO2: 35 MMOL/L (ref 23–27)
POC TCO2: 38 MMOL/L (ref 23–27)
POC TCO2: 39 MMOL/L (ref 23–27)
POC TCO2: 39 MMOL/L (ref 23–27)
POTASSIUM BLD-SCNC: 5.9 MMOL/L (ref 3.5–5.1)
POTASSIUM SERPL-SCNC: 4.6 MMOL/L (ref 3.5–5.1)
POTASSIUM SERPL-SCNC: 4.6 MMOL/L (ref 3.5–5.1)
POTASSIUM SERPL-SCNC: 5 MMOL/L (ref 3.5–5.1)
POTASSIUM SERPL-SCNC: 6.2 MMOL/L (ref 3.5–5.1)
PROCALCITONIN SERPL IA-MCNC: 0.15 NG/ML (ref 0–0.5)
PROT SERPL-MCNC: 6.6 G/DL (ref 6–8.4)
PROT UR QL STRIP: ABNORMAL
RBC # BLD AUTO: 2.82 M/UL (ref 4–5.4)
RBC # BLD AUTO: 3.59 M/UL (ref 4–5.4)
RBC #/AREA URNS HPF: 5 /HPF (ref 0–4)
SAMPLE: ABNORMAL
SARS-COV-2 RDRP RESP QL NAA+PROBE: NEGATIVE
SITE: ABNORMAL
SODIUM BLD-SCNC: 141 MMOL/L (ref 136–145)
SODIUM SERPL-SCNC: 141 MMOL/L (ref 136–145)
SODIUM SERPL-SCNC: 142 MMOL/L (ref 136–145)
SODIUM SERPL-SCNC: 144 MMOL/L (ref 136–145)
SODIUM SERPL-SCNC: 146 MMOL/L (ref 136–145)
SP GR UR STRIP: 1.02 (ref 1–1.03)
SP02: 100
SPECIMEN SOURCE: NORMAL
SQUAMOUS #/AREA URNS HPF: 12 /HPF
TROPONIN I SERPL HS-MCNC: 26.7 PG/ML (ref 0–14.9)
URN SPEC COLLECT METH UR: ABNORMAL
UROBILINOGEN UR STRIP-ACNC: ABNORMAL EU/DL
VT: 350
WBC # BLD AUTO: 10.21 K/UL (ref 3.9–12.7)
WBC # BLD AUTO: 23.08 K/UL (ref 3.9–12.7)
WBC #/AREA URNS HPF: 2 /HPF (ref 0–5)

## 2023-11-04 PROCEDURE — 84295 ASSAY OF SERUM SODIUM: CPT

## 2023-11-04 PROCEDURE — 25000242 PHARM REV CODE 250 ALT 637 W/ HCPCS: Performed by: FAMILY MEDICINE

## 2023-11-04 PROCEDURE — 87086 URINE CULTURE/COLONY COUNT: CPT | Performed by: EMERGENCY MEDICINE

## 2023-11-04 PROCEDURE — 99291 CRITICAL CARE FIRST HOUR: CPT

## 2023-11-04 PROCEDURE — 85027 COMPLETE CBC AUTOMATED: CPT | Performed by: EMERGENCY MEDICINE

## 2023-11-04 PROCEDURE — 87502 INFLUENZA DNA AMP PROBE: CPT | Performed by: EMERGENCY MEDICINE

## 2023-11-04 PROCEDURE — 96365 THER/PROPH/DIAG IV INF INIT: CPT

## 2023-11-04 PROCEDURE — 63600175 PHARM REV CODE 636 W HCPCS: Performed by: FAMILY MEDICINE

## 2023-11-04 PROCEDURE — 87641 MR-STAPH DNA AMP PROBE: CPT | Performed by: FAMILY MEDICINE

## 2023-11-04 PROCEDURE — 99291 PR CRITICAL CARE, E/M 30-74 MINUTES: ICD-10-PCS | Mod: ,,, | Performed by: INTERNAL MEDICINE

## 2023-11-04 PROCEDURE — 80053 COMPREHEN METABOLIC PANEL: CPT | Performed by: EMERGENCY MEDICINE

## 2023-11-04 PROCEDURE — 99900026 HC AIRWAY MAINTENANCE (STAT)

## 2023-11-04 PROCEDURE — 83880 ASSAY OF NATRIURETIC PEPTIDE: CPT | Performed by: EMERGENCY MEDICINE

## 2023-11-04 PROCEDURE — 93005 ELECTROCARDIOGRAM TRACING: CPT | Performed by: INTERNAL MEDICINE

## 2023-11-04 PROCEDURE — 99900035 HC TECH TIME PER 15 MIN (STAT)

## 2023-11-04 PROCEDURE — 25000003 PHARM REV CODE 250: Performed by: FAMILY MEDICINE

## 2023-11-04 PROCEDURE — 25000003 PHARM REV CODE 250: Performed by: EMERGENCY MEDICINE

## 2023-11-04 PROCEDURE — 36415 COLL VENOUS BLD VENIPUNCTURE: CPT | Performed by: EMERGENCY MEDICINE

## 2023-11-04 PROCEDURE — 87040 BLOOD CULTURE FOR BACTERIA: CPT | Mod: 59 | Performed by: EMERGENCY MEDICINE

## 2023-11-04 PROCEDURE — 85007 BL SMEAR W/DIFF WBC COUNT: CPT | Performed by: EMERGENCY MEDICINE

## 2023-11-04 PROCEDURE — 82803 BLOOD GASES ANY COMBINATION: CPT

## 2023-11-04 PROCEDURE — 87040 BLOOD CULTURE FOR BACTERIA: CPT | Performed by: EMERGENCY MEDICINE

## 2023-11-04 PROCEDURE — 81001 URINALYSIS AUTO W/SCOPE: CPT | Performed by: EMERGENCY MEDICINE

## 2023-11-04 PROCEDURE — C9113 INJ PANTOPRAZOLE SODIUM, VIA: HCPCS | Performed by: FAMILY MEDICINE

## 2023-11-04 PROCEDURE — 94640 AIRWAY INHALATION TREATMENT: CPT

## 2023-11-04 PROCEDURE — 85025 COMPLETE CBC W/AUTO DIFF WBC: CPT | Performed by: FAMILY MEDICINE

## 2023-11-04 PROCEDURE — 87070 CULTURE OTHR SPECIMN AEROBIC: CPT | Performed by: EMERGENCY MEDICINE

## 2023-11-04 PROCEDURE — 94799 UNLISTED PULMONARY SVC/PX: CPT

## 2023-11-04 PROCEDURE — 96375 TX/PRO/DX INJ NEW DRUG ADDON: CPT

## 2023-11-04 PROCEDURE — 63600175 PHARM REV CODE 636 W HCPCS

## 2023-11-04 PROCEDURE — 36600 WITHDRAWAL OF ARTERIAL BLOOD: CPT

## 2023-11-04 PROCEDURE — 99291 CRITICAL CARE FIRST HOUR: CPT | Mod: ,,, | Performed by: INTERNAL MEDICINE

## 2023-11-04 PROCEDURE — 93010 ELECTROCARDIOGRAM REPORT: CPT | Mod: 76,,, | Performed by: INTERNAL MEDICINE

## 2023-11-04 PROCEDURE — 84145 PROCALCITONIN (PCT): CPT | Performed by: EMERGENCY MEDICINE

## 2023-11-04 PROCEDURE — 82330 ASSAY OF CALCIUM: CPT

## 2023-11-04 PROCEDURE — 36415 COLL VENOUS BLD VENIPUNCTURE: CPT | Performed by: FAMILY MEDICINE

## 2023-11-04 PROCEDURE — 82565 ASSAY OF CREATININE: CPT

## 2023-11-04 PROCEDURE — 27000221 HC OXYGEN, UP TO 24 HOURS

## 2023-11-04 PROCEDURE — 84484 ASSAY OF TROPONIN QUANT: CPT | Performed by: EMERGENCY MEDICINE

## 2023-11-04 PROCEDURE — 99900031 HC PATIENT EDUCATION (STAT)

## 2023-11-04 PROCEDURE — 94644 CONT INHLJ TX 1ST HOUR: CPT

## 2023-11-04 PROCEDURE — 25000242 PHARM REV CODE 250 ALT 637 W/ HCPCS: Performed by: EMERGENCY MEDICINE

## 2023-11-04 PROCEDURE — 83605 ASSAY OF LACTIC ACID: CPT | Performed by: EMERGENCY MEDICINE

## 2023-11-04 PROCEDURE — 85379 FIBRIN DEGRADATION QUANT: CPT | Performed by: FAMILY MEDICINE

## 2023-11-04 PROCEDURE — 93010 ELECTROCARDIOGRAM REPORT: CPT | Mod: ,,, | Performed by: INTERNAL MEDICINE

## 2023-11-04 PROCEDURE — 85014 HEMATOCRIT: CPT

## 2023-11-04 PROCEDURE — 87205 SMEAR GRAM STAIN: CPT | Performed by: EMERGENCY MEDICINE

## 2023-11-04 PROCEDURE — 82962 GLUCOSE BLOOD TEST: CPT

## 2023-11-04 PROCEDURE — 94761 N-INVAS EAR/PLS OXIMETRY MLT: CPT

## 2023-11-04 PROCEDURE — 63600175 PHARM REV CODE 636 W HCPCS: Performed by: EMERGENCY MEDICINE

## 2023-11-04 PROCEDURE — 84132 ASSAY OF SERUM POTASSIUM: CPT

## 2023-11-04 PROCEDURE — 80048 BASIC METABOLIC PNL TOTAL CA: CPT | Performed by: FAMILY MEDICINE

## 2023-11-04 PROCEDURE — 20000000 HC ICU ROOM

## 2023-11-04 PROCEDURE — 87077 CULTURE AEROBIC IDENTIFY: CPT | Performed by: EMERGENCY MEDICINE

## 2023-11-04 PROCEDURE — 94002 VENT MGMT INPAT INIT DAY: CPT

## 2023-11-04 PROCEDURE — 94003 VENT MGMT INPAT SUBQ DAY: CPT

## 2023-11-04 PROCEDURE — 51702 INSERT TEMP BLADDER CATH: CPT

## 2023-11-04 PROCEDURE — 93010 EKG 12-LEAD: ICD-10-PCS | Mod: ,,, | Performed by: INTERNAL MEDICINE

## 2023-11-04 PROCEDURE — 87186 SC STD MICRODIL/AGAR DIL: CPT | Performed by: EMERGENCY MEDICINE

## 2023-11-04 PROCEDURE — U0002 COVID-19 LAB TEST NON-CDC: HCPCS | Performed by: EMERGENCY MEDICINE

## 2023-11-04 RX ORDER — SODIUM CHLORIDE 0.9 % (FLUSH) 0.9 %
10 SYRINGE (ML) INJECTION
Status: DISCONTINUED | OUTPATIENT
Start: 2023-11-04 | End: 2023-11-13 | Stop reason: HOSPADM

## 2023-11-04 RX ORDER — ENOXAPARIN SODIUM 100 MG/ML
40 INJECTION SUBCUTANEOUS EVERY 24 HOURS
Status: DISCONTINUED | OUTPATIENT
Start: 2023-11-04 | End: 2023-11-04

## 2023-11-04 RX ORDER — PANTOPRAZOLE SODIUM 40 MG/10ML
40 INJECTION, POWDER, LYOPHILIZED, FOR SOLUTION INTRAVENOUS DAILY
Status: DISCONTINUED | OUTPATIENT
Start: 2023-11-04 | End: 2023-11-12

## 2023-11-04 RX ORDER — TALC
6 POWDER (GRAM) TOPICAL NIGHTLY PRN
Status: DISCONTINUED | OUTPATIENT
Start: 2023-11-04 | End: 2023-11-13 | Stop reason: HOSPADM

## 2023-11-04 RX ORDER — IBUPROFEN 200 MG
16 TABLET ORAL
Status: DISCONTINUED | OUTPATIENT
Start: 2023-11-04 | End: 2023-11-13 | Stop reason: HOSPADM

## 2023-11-04 RX ORDER — MORPHINE SULFATE 4 MG/ML
4 INJECTION, SOLUTION INTRAMUSCULAR; INTRAVENOUS EVERY 4 HOURS PRN
Status: DISCONTINUED | OUTPATIENT
Start: 2023-11-04 | End: 2023-11-13 | Stop reason: HOSPADM

## 2023-11-04 RX ORDER — MUPIROCIN 20 MG/G
OINTMENT TOPICAL 2 TIMES DAILY
Status: COMPLETED | OUTPATIENT
Start: 2023-11-04 | End: 2023-11-09

## 2023-11-04 RX ORDER — IPRATROPIUM BROMIDE AND ALBUTEROL SULFATE 2.5; .5 MG/3ML; MG/3ML
3 SOLUTION RESPIRATORY (INHALATION) EVERY 4 HOURS PRN
Status: DISCONTINUED | OUTPATIENT
Start: 2023-11-04 | End: 2023-11-13 | Stop reason: HOSPADM

## 2023-11-04 RX ORDER — FENTANYL CITRATE-0.9 % NACL/PF 10 MCG/ML
0-250 PLASTIC BAG, INJECTION (ML) INTRAVENOUS CONTINUOUS
Status: DISCONTINUED | OUTPATIENT
Start: 2023-11-04 | End: 2023-11-07

## 2023-11-04 RX ORDER — CLOPIDOGREL BISULFATE 75 MG/1
75 TABLET ORAL DAILY
Status: DISCONTINUED | OUTPATIENT
Start: 2023-11-04 | End: 2023-11-13 | Stop reason: HOSPADM

## 2023-11-04 RX ORDER — METHYLPREDNISOLONE SOD SUCC 125 MG
125 VIAL (EA) INJECTION
Status: COMPLETED | OUTPATIENT
Start: 2023-11-04 | End: 2023-11-04

## 2023-11-04 RX ORDER — ENOXAPARIN SODIUM 100 MG/ML
1 INJECTION SUBCUTANEOUS EVERY 24 HOURS
Status: DISCONTINUED | OUTPATIENT
Start: 2023-11-04 | End: 2023-11-10

## 2023-11-04 RX ORDER — IPRATROPIUM BROMIDE AND ALBUTEROL SULFATE 2.5; .5 MG/3ML; MG/3ML
3 SOLUTION RESPIRATORY (INHALATION) EVERY 4 HOURS
Status: DISCONTINUED | OUTPATIENT
Start: 2023-11-04 | End: 2023-11-06

## 2023-11-04 RX ORDER — DEXAMETHASONE 4 MG/1
4 TABLET ORAL DAILY
Status: ON HOLD | COMMUNITY
Start: 2023-10-28 | End: 2023-11-13 | Stop reason: HOSPADM

## 2023-11-04 RX ORDER — AMOXICILLIN 250 MG
1 CAPSULE ORAL 2 TIMES DAILY PRN
Status: DISCONTINUED | OUTPATIENT
Start: 2023-11-04 | End: 2023-11-13 | Stop reason: HOSPADM

## 2023-11-04 RX ORDER — ONDANSETRON 2 MG/ML
4 INJECTION INTRAMUSCULAR; INTRAVENOUS EVERY 6 HOURS PRN
Status: DISCONTINUED | OUTPATIENT
Start: 2023-11-04 | End: 2023-11-13 | Stop reason: HOSPADM

## 2023-11-04 RX ORDER — ALBUTEROL SULFATE 0.83 MG/ML
5 SOLUTION RESPIRATORY (INHALATION)
Status: DISCONTINUED | OUTPATIENT
Start: 2023-11-04 | End: 2023-11-04

## 2023-11-04 RX ORDER — METHYLPREDNISOLONE SOD SUCC 125 MG
80 VIAL (EA) INJECTION EVERY 8 HOURS
Status: DISCONTINUED | OUTPATIENT
Start: 2023-11-04 | End: 2023-11-04

## 2023-11-04 RX ORDER — PROPOFOL 10 MG/ML
INJECTION, EMULSION INTRAVENOUS
Status: COMPLETED
Start: 2023-11-04 | End: 2023-11-04

## 2023-11-04 RX ORDER — DOBUTAMINE HYDROCHLORIDE 400 MG/100ML
0-20 INJECTION INTRAVENOUS CONTINUOUS
Status: DISCONTINUED | OUTPATIENT
Start: 2023-11-04 | End: 2023-11-05

## 2023-11-04 RX ORDER — INSULIN ASPART 100 [IU]/ML
0-10 INJECTION, SOLUTION INTRAVENOUS; SUBCUTANEOUS EVERY 6 HOURS PRN
Status: DISCONTINUED | OUTPATIENT
Start: 2023-11-04 | End: 2023-11-13 | Stop reason: HOSPADM

## 2023-11-04 RX ORDER — INDOMETHACIN 25 MG/1
200 CAPSULE ORAL ONCE
Status: DISCONTINUED | OUTPATIENT
Start: 2023-11-04 | End: 2023-11-04

## 2023-11-04 RX ORDER — ENOXAPARIN SODIUM 100 MG/ML
30 INJECTION SUBCUTANEOUS EVERY 24 HOURS
Status: DISCONTINUED | OUTPATIENT
Start: 2023-11-04 | End: 2023-11-04

## 2023-11-04 RX ORDER — FUROSEMIDE 10 MG/ML
40 INJECTION INTRAMUSCULAR; INTRAVENOUS 2 TIMES DAILY
Status: DISCONTINUED | OUTPATIENT
Start: 2023-11-04 | End: 2023-11-06

## 2023-11-04 RX ORDER — FENTANYL CITRATE-0.9 % NACL/PF 10 MCG/ML
0-250 PLASTIC BAG, INJECTION (ML) INTRAVENOUS CONTINUOUS
Status: DISCONTINUED | OUTPATIENT
Start: 2023-11-04 | End: 2023-11-04

## 2023-11-04 RX ORDER — NALOXONE HCL 0.4 MG/ML
0.02 VIAL (ML) INJECTION
Status: DISCONTINUED | OUTPATIENT
Start: 2023-11-04 | End: 2023-11-13 | Stop reason: HOSPADM

## 2023-11-04 RX ORDER — METOPROLOL TARTRATE 1 MG/ML
2.5 INJECTION, SOLUTION INTRAVENOUS EVERY 6 HOURS PRN
Status: DISCONTINUED | OUTPATIENT
Start: 2023-11-04 | End: 2023-11-13 | Stop reason: HOSPADM

## 2023-11-04 RX ORDER — ALBUTEROL SULFATE 0.83 MG/ML
10 SOLUTION RESPIRATORY (INHALATION) ONCE
Status: COMPLETED | OUTPATIENT
Start: 2023-11-04 | End: 2023-11-04

## 2023-11-04 RX ORDER — SIMETHICONE 80 MG
1 TABLET,CHEWABLE ORAL 4 TIMES DAILY PRN
Status: DISCONTINUED | OUTPATIENT
Start: 2023-11-04 | End: 2023-11-13 | Stop reason: HOSPADM

## 2023-11-04 RX ORDER — FUROSEMIDE 10 MG/ML
40 INJECTION INTRAMUSCULAR; INTRAVENOUS
Status: COMPLETED | OUTPATIENT
Start: 2023-11-04 | End: 2023-11-04

## 2023-11-04 RX ORDER — NOREPINEPHRINE BITARTRATE/D5W 4MG/250ML
PLASTIC BAG, INJECTION (ML) INTRAVENOUS
Status: DISPENSED
Start: 2023-11-04 | End: 2023-11-05

## 2023-11-04 RX ORDER — POLYETHYLENE GLYCOL 3350 17 G/17G
17 POWDER, FOR SOLUTION ORAL 2 TIMES DAILY PRN
Status: DISCONTINUED | OUTPATIENT
Start: 2023-11-04 | End: 2023-11-13 | Stop reason: HOSPADM

## 2023-11-04 RX ORDER — DOBUTAMINE HYDROCHLORIDE 400 MG/100ML
INJECTION INTRAVENOUS
Status: DISPENSED
Start: 2023-11-04 | End: 2023-11-05

## 2023-11-04 RX ORDER — PROPOFOL 10 MG/ML
0-50 INJECTION, EMULSION INTRAVENOUS CONTINUOUS
Status: DISCONTINUED | OUTPATIENT
Start: 2023-11-04 | End: 2023-11-05

## 2023-11-04 RX ORDER — HYDROCODONE BITARTRATE AND ACETAMINOPHEN 5; 325 MG/1; MG/1
1 TABLET ORAL EVERY 4 HOURS PRN
Status: DISCONTINUED | OUTPATIENT
Start: 2023-11-04 | End: 2023-11-13 | Stop reason: HOSPADM

## 2023-11-04 RX ORDER — GLUCAGON 1 MG
1 KIT INJECTION
Status: DISCONTINUED | OUTPATIENT
Start: 2023-11-04 | End: 2023-11-13 | Stop reason: HOSPADM

## 2023-11-04 RX ORDER — ATORVASTATIN CALCIUM 40 MG/1
40 TABLET, FILM COATED ORAL NIGHTLY
Status: DISCONTINUED | OUTPATIENT
Start: 2023-11-04 | End: 2023-11-13 | Stop reason: HOSPADM

## 2023-11-04 RX ORDER — SODIUM BICARBONATE 1 MEQ/ML
200 SYRINGE (ML) INTRAVENOUS ONCE
Status: COMPLETED | OUTPATIENT
Start: 2023-11-04 | End: 2023-11-04

## 2023-11-04 RX ORDER — VANCOMYCIN HCL IN 5 % DEXTROSE 1G/250ML
1000 PLASTIC BAG, INJECTION (ML) INTRAVENOUS ONCE
Status: COMPLETED | OUTPATIENT
Start: 2023-11-04 | End: 2023-11-04

## 2023-11-04 RX ORDER — NAPROXEN SODIUM 220 MG/1
81 TABLET, FILM COATED ORAL DAILY
Status: DISCONTINUED | OUTPATIENT
Start: 2023-11-04 | End: 2023-11-13 | Stop reason: HOSPADM

## 2023-11-04 RX ORDER — ACETAMINOPHEN 325 MG/1
650 TABLET ORAL EVERY 8 HOURS PRN
Status: DISCONTINUED | OUTPATIENT
Start: 2023-11-04 | End: 2023-11-13 | Stop reason: HOSPADM

## 2023-11-04 RX ORDER — IBUPROFEN 200 MG
24 TABLET ORAL
Status: DISCONTINUED | OUTPATIENT
Start: 2023-11-04 | End: 2023-11-13 | Stop reason: HOSPADM

## 2023-11-04 RX ORDER — IPRATROPIUM BROMIDE AND ALBUTEROL SULFATE 2.5; .5 MG/3ML; MG/3ML
3 SOLUTION RESPIRATORY (INHALATION)
Status: COMPLETED | OUTPATIENT
Start: 2023-11-04 | End: 2023-11-04

## 2023-11-04 RX ORDER — CALCIUM GLUCONATE 20 MG/ML
1 INJECTION, SOLUTION INTRAVENOUS ONCE
Status: COMPLETED | OUTPATIENT
Start: 2023-11-04 | End: 2023-11-04

## 2023-11-04 RX ORDER — ENOXAPARIN SODIUM 100 MG/ML
1 INJECTION SUBCUTANEOUS 2 TIMES DAILY
Status: DISCONTINUED | OUTPATIENT
Start: 2023-11-04 | End: 2023-11-04

## 2023-11-04 RX ORDER — GABAPENTIN 300 MG/1
300 CAPSULE ORAL DAILY
COMMUNITY
Start: 2023-10-24

## 2023-11-04 RX ORDER — NOREPINEPHRINE BITARTRATE/D5W 4MG/250ML
0-3 PLASTIC BAG, INJECTION (ML) INTRAVENOUS CONTINUOUS
Status: DISCONTINUED | OUTPATIENT
Start: 2023-11-04 | End: 2023-11-04

## 2023-11-04 RX ADMIN — PROPOFOL 50 MCG/KG/MIN: 10 INJECTION, EMULSION INTRAVENOUS at 02:11

## 2023-11-04 RX ADMIN — PANTOPRAZOLE SODIUM 40 MG: 40 INJECTION, POWDER, FOR SOLUTION INTRAVENOUS at 04:11

## 2023-11-04 RX ADMIN — PROPOFOL 5 MCG/KG/MIN: 10 INJECTION, EMULSION INTRAVENOUS at 11:11

## 2023-11-04 RX ADMIN — IPRATROPIUM BROMIDE AND ALBUTEROL SULFATE 3 ML: 2.5; .5 SOLUTION RESPIRATORY (INHALATION) at 03:11

## 2023-11-04 RX ADMIN — CEFEPIME 2 G: 2 INJECTION, POWDER, FOR SOLUTION INTRAVENOUS at 12:11

## 2023-11-04 RX ADMIN — ASPIRIN 81 MG 81 MG: 81 TABLET ORAL at 04:11

## 2023-11-04 RX ADMIN — Medication 85 MCG/HR: at 02:11

## 2023-11-04 RX ADMIN — PIPERACILLIN SODIUM AND TAZOBACTAM SODIUM 3.38 G: 3; .375 INJECTION, POWDER, LYOPHILIZED, FOR SOLUTION INTRAVENOUS at 04:11

## 2023-11-04 RX ADMIN — VANCOMYCIN HYDROCHLORIDE 1000 MG: 1 INJECTION, POWDER, LYOPHILIZED, FOR SOLUTION INTRAVENOUS at 06:11

## 2023-11-04 RX ADMIN — SODIUM BICARBONATE 200 MEQ: 84 INJECTION, SOLUTION INTRAVENOUS at 02:11

## 2023-11-04 RX ADMIN — IPRATROPIUM BROMIDE AND ALBUTEROL SULFATE 3 ML: 2.5; .5 SOLUTION RESPIRATORY (INHALATION) at 11:11

## 2023-11-04 RX ADMIN — METHYLPREDNISOLONE SODIUM SUCCINATE 125 MG: 125 INJECTION, POWDER, FOR SOLUTION INTRAMUSCULAR; INTRAVENOUS at 11:11

## 2023-11-04 RX ADMIN — Medication 25 MCG/HR: at 12:11

## 2023-11-04 RX ADMIN — CLOPIDOGREL BISULFATE 75 MG: 75 TABLET, FILM COATED ORAL at 04:11

## 2023-11-04 RX ADMIN — PROPOFOL 50 MCG/KG/MIN: 10 INJECTION, EMULSION INTRAVENOUS at 07:11

## 2023-11-04 RX ADMIN — PROPOFOL 40 MCG/KG/MIN: 10 INJECTION, EMULSION INTRAVENOUS at 11:11

## 2023-11-04 RX ADMIN — MUPIROCIN 1 G: 20 OINTMENT TOPICAL at 09:11

## 2023-11-04 RX ADMIN — FUROSEMIDE 40 MG: 10 INJECTION, SOLUTION INTRAMUSCULAR; INTRAVENOUS at 11:11

## 2023-11-04 RX ADMIN — IPRATROPIUM BROMIDE AND ALBUTEROL SULFATE 3 ML: 2.5; .5 SOLUTION RESPIRATORY (INHALATION) at 07:11

## 2023-11-04 RX ADMIN — FUROSEMIDE 40 MG: 10 INJECTION, SOLUTION INTRAVENOUS at 09:11

## 2023-11-04 RX ADMIN — DEXTROSE MONOHYDRATE 25 G: 25 INJECTION, SOLUTION INTRAVENOUS at 02:11

## 2023-11-04 RX ADMIN — INSULIN ASPART 6 UNITS: 100 INJECTION, SOLUTION INTRAVENOUS; SUBCUTANEOUS at 06:11

## 2023-11-04 RX ADMIN — ENOXAPARIN SODIUM 70 MG: 80 INJECTION SUBCUTANEOUS at 05:11

## 2023-11-04 RX ADMIN — INSULIN HUMAN 5 UNITS: 100 INJECTION, SOLUTION PARENTERAL at 02:11

## 2023-11-04 RX ADMIN — MIDAZOLAM HYDROCHLORIDE 1 MG/HR: 5 INJECTION, SOLUTION INTRAMUSCULAR; INTRAVENOUS at 04:11

## 2023-11-04 RX ADMIN — ALBUTEROL SULFATE 10 MG: 2.5 SOLUTION RESPIRATORY (INHALATION) at 02:11

## 2023-11-04 RX ADMIN — CALCIUM GLUCONATE 1 G: 20 INJECTION, SOLUTION INTRAVENOUS at 02:11

## 2023-11-04 RX ADMIN — VANCOMYCIN HYDROCHLORIDE 1000 MG: 1 INJECTION, POWDER, LYOPHILIZED, FOR SOLUTION INTRAVENOUS at 04:11

## 2023-11-04 RX ADMIN — ATORVASTATIN CALCIUM 40 MG: 40 TABLET, FILM COATED ORAL at 09:11

## 2023-11-04 RX ADMIN — DOBUTAMINE IN DEXTROSE 2.5 MCG/KG/MIN: 400 INJECTION, SOLUTION INTRAVENOUS at 10:11

## 2023-11-04 NOTE — CONSULTS
11/04/2023      Admit Date: 11/4/2023  Marisol Kerr  New Patient Consult    Chief Complaint   Patient presents with    Unresponsive     Found unresponsive in wheelchair at home         History of Present Illness:  Pt is a 77 yo female with end stage COPD, chronic resp failure on 8L home O2 who is on hospice. She called EMS and was in distress then became unresponsive. She was found to be hypercapneic intubated in ED before her code status and hospice status was known. Her daughter is at bedside and wishes to continue vent support for now hoping she may improve.  Pt has been at home on hospice but daughter states she wants her to go to NH as she hasn't been doing well at home.      PFSH:  Past Medical History:   Diagnosis Date    CAD (coronary artery disease)     Cancer     colon    CHF (congestive heart failure)     Colitis     Colon cancer     COPD (chronic obstructive pulmonary disease)     Decreased hearing     Diabetes mellitus     Diabetes mellitus, type 2     Hypercholesterolemia     Hypertension     Hypoxemia     Insomnia     Obesity     Osteoarthritis      Past Surgical History:   Procedure Laterality Date    ANGIOGRAM, CORONARY, WITH LEFT HEART CATHETERIZATION N/A 2/10/2022    Procedure: Angiogram, Coronary, with Left Heart Cath;  Surgeon: Cristian Benjamin MD;  Location: [facility] CATH/EP LAB;  Service: Cardiology;  Laterality: N/A;    CARDIAC CATHETERIZATION      CHOLECYSTECTOMY      COLECTOMY      CORONARY ANGIOGRAPHY N/A 8/29/2023    Procedure: ANGIOGRAM, CORONARY ARTERY;  Surgeon: Nba Riggs MD;  Location: [facility] CATH LAB;  Service: Cardiology;  Laterality: N/A;    CORONARY ANGIOPLASTY      CORONARY STENT PLACEMENT      ERCP N/A 1/16/2023    Procedure: ERCP (ENDOSCOPIC RETROGRADE CHOLANGIOPANCREATOGRAPHY);  Surgeon: Biju Kramer III, MD;  Location: [facility] ENDO;  Service: Endoscopy;  Laterality: N/A;    ESOPHAGOGASTRODUODENOSCOPY N/A 1/29/2023    Procedure: EGD (ESOPHAGOGASTRODUODENOSCOPY);   "Surgeon: Aarti Alvarez MD;  Location: [facility] ENDO;  Service: Endoscopy;  Laterality: N/A;    EXTERNAL EAR SURGERY      EYE SURGERY      FLEXIBLE SIGMOIDOSCOPY N/A 2019    Procedure: SIGMOIDOSCOPY, FLEXIBLE;  Surgeon: Biju Kramer III, MD;  Location: [facility] ENDO;  Service: Endoscopy;  Laterality: N/A;    HYSTERECTOMY      partial    INSTANTANEOUS WAVE-FREE RATIO (IFR) N/A 2023    Procedure: Instantaneous Wave-Free Ratio (IFR);  Surgeon: Nba Riggs MD;  Location: [facility] CATH LAB;  Service: Cardiology;  Laterality: N/A;    STENT, DRUG ELUTING, SINGLE VESSEL, CORONARY N/A 2023    Procedure: Stent, Drug Eluting, Single Vessel, Coronary;  Surgeon: Nba Riggs MD;  Location: [facility] CATH LAB;  Service: Cardiology;  Laterality: N/A;     Social History     Tobacco Use    Smoking status: Former     Current packs/day: 0.00     Average packs/day: 3.0 packs/day for 50.0 years (150.1 ttl pk-yrs)     Types: Cigarettes     Start date: 3/30/1964     Quit date: 2006     Years since quittin.6    Smokeless tobacco: Never    Tobacco comments:     ex smoker 15 years   Substance Use Topics    Alcohol use: Yes    Drug use: No     Family History   Problem Relation Age of Onset    Febrile seizures Mother     Heart failure Father      Review of patient's allergies indicates:   Allergen Reactions    Iodinated contrast media     Strawberries [strawberry] Hives and Itching       Performance Status:Performance Status:The patient's activity level is housebound activities.    Review of Systems:  due to neurologic status/impairments a Review of Systems could not be obtained       Exam:Comprehensive exam done. BP (!) 116/57   Pulse (!) 112   Temp 98.4 °F (36.9 °C) (Axillary)   Resp (!) 28   Ht 5' 3" (1.6 m)   Wt 74.9 kg (165 lb 2 oz)   SpO2 99%   BMI 29.25 kg/m²   Exam included Vitals as listed, and patient's appearance and affect and alertness and mood, oral exam for yeast and hygiene and pharynx " lesions and Mallapatti (M) score, neck with inspection for jvd and masses and thyroid abnormalities and lymph nodes (supraclavicular and infraclavicular nodes also examined and noted if abn), chest exam included symmetry and effort and fremitus and percussion and auscultation, cardiac exam included rhythm and gallops and murmur and rubs and jvd and edema, abdominal exam for mass and hepatosplenomegaly and tenderness and hernias and bowel sounds, Musculoskeletal exam with muscle tone and posture and mobility/gait and  strenght, and skin for rashes and cyanosis and pallor and turgor, extremity for clubbing.  Findings were normal except as listed below:  Intubated, sedated  HR regular  Breath sounds diminished bilaterally  Abd obese, soft  Bilat lower ext pitting edema    Radiographs reviewed: view by direct vision   Imaging Results              X-Ray Knee 1 or 2 View Right (Final result)  Result time 11/04/23 11:55:53      Final result by García Russell MD (11/04/23 11:55:53)                   Narrative:    CLINICAL HISTORY:  78 years (1945) Female (Found unresponsive in wheelchair at home; )    TECHNIQUE:  XR KNEE 1-2 VIEWS RIGHT. 2 view(s) obtained .    COMPARISON:  None available.    FINDINGS:  There is diffusely decreased osseous mineralization (suggesting osteoporosis/osteopenia) which limits evaluation for subtle fractures, that being said no acute displaced fracture or dislocation is seen. The joints and interspaces are maintained. There is a trace knee joint effusion in the suprapatellar recess. Faint calcification along the medial and lateral joint line suggesting chondrocalcinosis/CPPD. As the calcifications are seen in the arteries of the popliteal fossa. Faint popcorn-like calcification is seen in the medial metadiaphysis of the distal femur suggesting an enchondroma or bone infarct. Soft tissues are radiographically within normal limits and no radiopaque foreign body is  seen.    IMPRESSION:  No acute osseous abnormality.                  .    Electronically signed by:  aGrcía Russell MD  11/04/2023 11:55 AM CDT Workstation: [id number]                                     X-Ray Chest AP Portable (Final result)  Result time 11/04/23 11:12:13      Final result by García Russell MD (11/04/23 11:12:13)                   Narrative:    CLINICAL HISTORY:  78 years (1945) Female Found unresponsive in wheelchair at home; Hx of cad, chf, copd, colon cancer    TECHNIQUE:  Portable AP radiograph the chest. One view.    COMPARISON:  Radiograph from August 26, 2023.    FINDINGS:  Mild diffuse interstitial opacity throughout both lungs with a slight basilar predominance. There is blunting of both costophrenic angles consistent with small right and trace left pleural effusions and adjacent atelectasis. No pneumothorax is identified. The heart is mildly enlarged.  Osseous structures show degenerative changes in the spine. The visualized upper abdomen is unremarkable.    Lines and tubes: Endotracheal tube with tip projecting approximately 6 and meter from the juan j.    IMPRESSION:  Cardiomegaly and findings of mild interstitial pulmonary edema in the mid-lower lung zones.                  .            Electronically signed by:  García Russell MD  11/04/2023 11:12 AM CDT Workstation: [id number]                                        Labs     Recent Labs   Lab 11/04/23  1057 11/04/23  1059   WBC 23.08*  --    HGB 9.8*  --    HCT 35.4* 33*     --    BAND 2.0  --      Recent Labs   Lab 11/04/23  1057 11/04/23  1553    146*   K 6.2* 5.0    104   CO2 36* 35*   BUN 38* 40*   CREATININE 1.6* 1.6*   * 216*   CALCIUM 9.0 8.7   AST 14  --    ALT 11  --    ALKPHOS 85  --    BILITOT 0.2  --    PROT 6.6  --    ALBUMIN 3.6  --    PROCAL 0.149  --    LACTATE 0.5  --    BNP 1,995*  --      Recent Labs   Lab 11/04/23  1224   PH 7.259*   PCO2 73.3*   PO2 72*   HCO3 32.9*      Microbiology Results (last 7 days)       Procedure Component Value Units Date/Time    MRSA Screen by PCR [[id number]]  (Abnormal) Collected: 11/04/23 1609    Order Status: Completed Specimen: Nasopharyngeal Swab from Nasal Updated: 11/04/23 1736     MRSA SCREEN BY PCR Positive     Comment: Positive MRSA by PCR called and verbal readback obtained from [facility], RN. by [facility] 11/04/2023 17:36         Narrative:      Positive MRSA by PCR called and verbal readback obtained from [facility], RN. by [facility] 11/04/2023 17:36    Culture, Respiratory with Gram Stain [[id number]] Collected: 11/04/23 1122    Order Status: Completed Specimen: Respiratory from Sputum Updated: 11/04/23 1620     Gram Stain (Respiratory) <10 epithelial cells per low power field.     Gram Stain (Respiratory) Few WBC's     Gram Stain (Respiratory) Few Gram negative rods    Blood Culture #2 **CANNOT BE ORDERED STAT** [[id number]] Collected: 11/04/23 1552    Order Status: Sent Specimen: Blood Updated: 11/04/23 1600    Narrative:      Collection has been rescheduled by [facility] at 11/04/2023 12:28 Reason:   Unable to collect 2nd set  Collection has been rescheduled by [facility] at 11/04/2023 12:28 Reason:   Unable to collect 2nd set    Urine culture [[id number]] Collected: 11/04/23 1134    Order Status: No result Specimen: Urine from Clean Catch Updated: 11/04/23 1345    Urine Culture High Risk [[id number]]     Order Status: Completed Specimen: Urine     Blood Culture #1 **CANNOT BE ORDERED STAT** [[id number]] Collected: 11/04/23 1204    Order Status: Sent Specimen: Blood Updated: 11/04/23 1237            Impression:  Active Hospital Problems    Diagnosis  POA    *Acute on chronic heart failure with preserved ejection fraction (HFpEF) [I50.33]  Yes    Acute respiratory failure with hypoxia and hypercarbia [J96.01, J96.02]  Yes    Suspected Pneumonia [J18.9]  Yes    COPD exacerbation [J44.1]  Yes    Hyperkalemia [E87.5]  Yes    Leukocytosis  [D72.829]  Yes    Chronic diastolic congestive heart failure [I50.32]  Yes    COPD/emphysema [J44.9]  Yes     Chronic    Chronic kidney disease, stage 4 (severe) [N18.4]  Yes    DM type 2, controlled, with complication [E11.8]  Yes    Essential hypertension, benign [I10]  Yes      Resolved Hospital Problems   No resolved problems to display.               Plan:   Acute on chronic hypercapneic/hypoxemic resp failure  Acute on chronic CHF  COPD with acute exacerbation      - continue vent support  - daily SAT/SBT start in am  - solumedrol 60mg daily  - continue inhaled bronchodilators  - f/u cultures and continue antibiotics  - discussed goals of care w/ daughter at bedside and encouraged her to think about putting a time limit on invasive support if pt not getting better. Will need continued conversations    The following were evaluated and adjusted as needed: mechanical ventilator settings and weaning status, sedation and neurologic status, antibiotics, and acid base balance and oxygenation needs       Critical Care  - THE PATIENT HAS A HIGH PROBABILITY OF IMMINENT OR LIFE THREATENING DETERIORATION.  Over 50%time of encounter was in direct care at bedside.  Time was 30 to 74 minutes required for patient care.  Time needed for all of the above totaled 39 minutes.    Ronda Barajas MD  Pulmonary & Critical Care Medicine

## 2023-11-04 NOTE — ED PROVIDER NOTES
Encounter Date: 11/4/2023       History     Chief Complaint   Patient presents with    Unresponsive     Found unresponsive in wheelchair at home       78-year-old female brought in by EMS intubated.  History is limited, provided by EMS and review of her medical record.  She has listed history of CAD, CHF, COPD, and diabetes.  EMS reports that 1st responders were dispatched after to hang up calls to 911.  Upon their arrival the patient was sitting in a chair, in respiratory distress, said helped me before going unresponsive.  They began ventilation via bag-valve mask.  She was intubated by EMS.  She never lost a pulse.    The history is provided by the EMS personnel and medical records.     Review of patient's allergies indicates:   Allergen Reactions    Iodinated contrast media     Strawberries [strawberry] Hives and Itching     Past Medical History:   Diagnosis Date    CAD (coronary artery disease)     Cancer     colon    CHF (congestive heart failure)     Colitis     Colon cancer     COPD (chronic obstructive pulmonary disease)     Decreased hearing     Diabetes mellitus     Diabetes mellitus, type 2     Hypercholesterolemia     Hypertension     Hypoxemia     Insomnia     Obesity     Osteoarthritis      Past Surgical History:   Procedure Laterality Date    ANGIOGRAM, CORONARY, WITH LEFT HEART CATHETERIZATION N/A 2/10/2022    Procedure: Angiogram, Coronary, with Left Heart Cath;  Surgeon: Cristian Benjamin MD;  Location: [facility] CATH/EP LAB;  Service: Cardiology;  Laterality: N/A;    CARDIAC CATHETERIZATION      CHOLECYSTECTOMY      COLECTOMY      CORONARY ANGIOGRAPHY N/A 8/29/2023    Procedure: ANGIOGRAM, CORONARY ARTERY;  Surgeon: Nba Riggs MD;  Location: [facility] CATH LAB;  Service: Cardiology;  Laterality: N/A;    CORONARY ANGIOPLASTY      CORONARY STENT PLACEMENT      ERCP N/A 1/16/2023    Procedure: ERCP (ENDOSCOPIC RETROGRADE CHOLANGIOPANCREATOGRAPHY);  Surgeon: Biju Kramer III, MD;  Location:  [facility] ENDO;  Service: Endoscopy;  Laterality: N/A;    ESOPHAGOGASTRODUODENOSCOPY N/A 2023    Procedure: EGD (ESOPHAGOGASTRODUODENOSCOPY);  Surgeon: Aarti Alvarez MD;  Location: [facility];  Service: Endoscopy;  Laterality: N/A;    EXTERNAL EAR SURGERY      EYE SURGERY      FLEXIBLE SIGMOIDOSCOPY N/A 2019    Procedure: SIGMOIDOSCOPY, FLEXIBLE;  Surgeon: Biju Kramer III, MD;  Location: [facility];  Service: Endoscopy;  Laterality: N/A;    HYSTERECTOMY      partial    INSTANTANEOUS WAVE-FREE RATIO (IFR) N/A 2023    Procedure: Instantaneous Wave-Free Ratio (IFR);  Surgeon: Nba Riggs MD;  Location: [facility] CATH LAB;  Service: Cardiology;  Laterality: N/A;    STENT, DRUG ELUTING, SINGLE VESSEL, CORONARY N/A 2023    Procedure: Stent, Drug Eluting, Single Vessel, Coronary;  Surgeon: Nba Riggs MD;  Location: [facility] CATH LAB;  Service: Cardiology;  Laterality: N/A;     Family History   Problem Relation Age of Onset    Febrile seizures Mother     Heart failure Father      Social History     Tobacco Use    Smoking status: Former     Current packs/day: 0.00     Average packs/day: 3.0 packs/day for 50.0 years (150.1 ttl pk-yrs)     Types: Cigarettes     Start date: 3/30/1964     Quit date: 2006     Years since quittin.6    Smokeless tobacco: Never    Tobacco comments:     ex smoker 15 years   Substance Use Topics    Alcohol use: Yes    Drug use: No     Review of Systems   Unable to perform ROS: Intubated       Physical Exam     Initial Vitals   BP Pulse Resp Temp SpO2   23 1048 23 1048 23 1048 23 1127 23 1048   (!) 191/83 86 16 96.8 °F (36 °C) 100 %      MAP       --                Physical Exam    Nursing note and vitals reviewed.  Constitutional: She appears well-developed and well-nourished. She is not diaphoretic.   Intubated   HENT:   Head: Normocephalic.   Eyes: Pupils are equal, round, and reactive to light.   Neck: Neck supple.    Cardiovascular:  Normal rate.           Pulmonary/Chest:   Diminished breath sounds bilaterally   Abdominal: She exhibits no distension.   Musculoskeletal:         General: Edema present.      Cervical back: Neck supple.     Neurological: GCS eye subscore is 4. GCS verbal subscore is 5. GCS motor subscore is 6.   Opening eyes spontaneously, moves extremities to pain, not following command   Skin: Skin is warm and dry.   Psychiatric: She has a normal mood and affect.         ED Course   Critical Care    Date/Time: 11/4/2023 1:58 PM    Performed by: Stephen Minaya MD  Authorized by: Vidya Lopez MD  Direct patient critical care time: 20 minutes  Additional history critical care time: 5 minutes  Ordering / reviewing critical care time: 5 minutes  Documentation critical care time: 5 minutes  Consulting other physicians critical care time: 5 minutes  Consult with family critical care time: 5 minutes  Total critical care time (exclusive of procedural time) : 45 minutes  Critical care was necessary to treat or prevent imminent or life-threatening deterioration of the following conditions: respiratory failure.        Labs Reviewed   CBC W/ AUTO DIFFERENTIAL - Abnormal; Notable for the following components:       Result Value    WBC 23.08 (*)     RBC 3.59 (*)     Hemoglobin 9.8 (*)     Hematocrit 35.4 (*)     MCV 99 (*)     MCHC 27.7 (*)     Gran % 82.0 (*)     Lymph % 14.0 (*)     Mono % 2.0 (*)     All other components within normal limits   COMPREHENSIVE METABOLIC PANEL - Abnormal; Notable for the following components:    Potassium 6.2 (*)     CO2 36 (*)     Glucose 158 (*)     BUN 38 (*)     Creatinine 1.6 (*)     eGFR 33.2 (*)     Anion Gap 0 (*)     All other components within normal limits   TROPONIN I HIGH SENSITIVITY - Abnormal; Notable for the following components:    Troponin I High Sensitivity 26.7 (*)     All other components within normal limits   B-TYPE NATRIURETIC PEPTIDE - Abnormal; Notable for the  following components:    BNP 1,995 (*)     All other components within normal limits   URINALYSIS, REFLEX TO URINE CULTURE - Abnormal; Notable for the following components:    Appearance, UA Hazy (*)     Protein, UA 2+ (*)     Urobilinogen, UA 2.0-3.0 (*)     All other components within normal limits    Narrative:     Specimen Source->Urine   URINALYSIS MICROSCOPIC - Abnormal; Notable for the following components:    RBC, UA 5 (*)     Hyaline Casts, UA 27 (*)     All other components within normal limits    Narrative:     Specimen Source->Urine   ISTAT PROCEDURE - Abnormal; Notable for the following components:    POC PH 7.141 (*)     POC PCO2 105.8 (*)     POC PO2 528 (*)     POC HCO3 36.1 (*)     POC BE 7 (*)     POC Glucose 155 (*)     POC Potassium 5.9 (*)     POC TCO2 39 (*)     POC Hematocrit 33 (*)     All other components within normal limits   ISTAT CREATININE - Abnormal; Notable for the following components:    POC Creatinine 1.7 (*)     All other components within normal limits   ISTAT PROCEDURE - Abnormal; Notable for the following components:    POC PH 7.259 (*)     POC PCO2 73.3 (*)     POC PO2 72 (*)     POC HCO3 32.9 (*)     POC BE 6 (*)     POC TCO2 35 (*)     All other components within normal limits   CULTURE, URINE   CULTURE, RESPIRATORY   CULTURE, BLOOD   CULTURE, BLOOD   MRSA SCREEN BY PCR   CULTURE, URINE   LACTIC ACID, PLASMA   INFLUENZA A AND B ANTIGEN    Narrative:     Specimen Source->Nasopharyngeal Swab   SARS-COV-2 RNA AMPLIFICATION, QUAL   PROCALCITONIN   PROCALCITONIN   BASIC METABOLIC PANEL   HEMOGLOBIN A1C   POCT GLUCOSE MONITORING CONTINUOUS     EKG Readings: (Independently Interpreted)   Sinus rhythm.  Eighty-seven beats/minute.  Normal axis.  No ST elevation.     ECG Results              EKG 12-lead (In process)  Result time 11/04/23 11:32:01      In process by Interface, Lab In [facility] (11/04/23 11:32:01)                   Narrative:    Test Reason : R41.82,    Vent. Rate : 087  BPM     Atrial Rate : 087 BPM     P-R Int : 184 ms          QRS Dur : 094 ms      QT Int : 344 ms       P-R-T Axes : 081 068 090 degrees     QTc Int : 413 ms    Normal sinus rhythm  Septal infarct ,age undetermined  Abnormal ECG  When compared with ECG of 31-AUG-2023 18:18,  Nonspecific T wave abnormality now evident in Anterior-lateral leads    Referred By: [email or web address]   SELF           Confirmed By:                                   Imaging Results              X-Ray Knee 1 or 2 View Right (Final result)  Result time 11/04/23 11:55:53      Final result by García Russell MD (11/04/23 11:55:53)                   Narrative:    CLINICAL HISTORY:  78 years (1945) Female (Found unresponsive in wheelchair at home; )    TECHNIQUE:  XR KNEE 1-2 VIEWS RIGHT. 2 view(s) obtained .    COMPARISON:  None available.    FINDINGS:  There is diffusely decreased osseous mineralization (suggesting osteoporosis/osteopenia) which limits evaluation for subtle fractures, that being said no acute displaced fracture or dislocation is seen. The joints and interspaces are maintained. There is a trace knee joint effusion in the suprapatellar recess. Faint calcification along the medial and lateral joint line suggesting chondrocalcinosis/CPPD. As the calcifications are seen in the arteries of the popliteal fossa. Faint popcorn-like calcification is seen in the medial metadiaphysis of the distal femur suggesting an enchondroma or bone infarct. Soft tissues are radiographically within normal limits and no radiopaque foreign body is seen.    IMPRESSION:  No acute osseous abnormality.                  .    Electronically signed by:  García Russell MD  11/04/2023 11:55 AM CDT Workstation: [id number]                                     X-Ray Chest AP Portable (Final result)  Result time 11/04/23 11:12:13      Final result by García Russell MD (11/04/23 11:12:13)                   Narrative:    CLINICAL HISTORY:  78 years (1945)  Female Found unresponsive in wheelchair at home; Hx of cad, chf, copd, colon cancer    TECHNIQUE:  Portable AP radiograph the chest. One view.    COMPARISON:  Radiograph from August 26, 2023.    FINDINGS:  Mild diffuse interstitial opacity throughout both lungs with a slight basilar predominance. There is blunting of both costophrenic angles consistent with small right and trace left pleural effusions and adjacent atelectasis. No pneumothorax is identified. The heart is mildly enlarged.  Osseous structures show degenerative changes in the spine. The visualized upper abdomen is unremarkable.    Lines and tubes: Endotracheal tube with tip projecting approximately 6 and meter from the juan j.    IMPRESSION:  Cardiomegaly and findings of mild interstitial pulmonary edema in the mid-lower lung zones.                  .            Electronically signed by:  García Russell MD  11/04/2023 11:12 AM CDT Workstation: [id number]                                     Medications   propofol (DIPRIVAN) 10 mg/mL infusion (50 mcg/kg/min × 76.9 kg Intravenous Rate/Dose Change 11/4/23 1243)   fentaNYL 2500 mcg in 0.9% sodium chloride 250 mL infusion premix (titrating) (55 mcg/hr Intravenous Rate/Dose Change 11/4/23 1316)   albuterol nebulizer solution 10 mg (has no administration in time range)   vancomycin - pharmacy to dose (has no administration in time range)   dextrose 50% injection 25 g (has no administration in time range)     And   insulin regular injection 5 Units 0.05 mL (has no administration in time range)   calcium gluconate 1 g in NS IVPB (premixed) (has no administration in time range)   furosemide injection 40 mg (has no administration in time range)   methylPREDNISolone sodium succinate injection 80 mg (has no administration in time range)   albuterol-ipratropium 2.5 mg-0.5 mg/3 mL nebulizer solution 3 mL (has no administration in time range)   mupirocin 2 % ointment (has no administration in time range)    vancomycin in dextrose 5 % 1 gram/250 mL IVPB 1,000 mg (has no administration in time range)     Followed by   vancomycin in dextrose 5 % 1 gram/250 mL IVPB 1,000 mg (has no administration in time range)   pantoprazole injection 40 mg (has no administration in time range)   metoprolol injection 2.5 mg (has no administration in time range)   clopidogreL tablet 75 mg (has no administration in time range)   atorvastatin tablet 40 mg (has no administration in time range)   aspirin chewable tablet 81 mg (has no administration in time range)   allopurinol split tablet 50 mg (has no administration in time range)   sodium chloride 0.9% flush 10 mL (has no administration in time range)   albuterol-ipratropium 2.5 mg-0.5 mg/3 mL nebulizer solution 3 mL (has no administration in time range)   melatonin tablet 6 mg (has no administration in time range)   ondansetron injection 4 mg (has no administration in time range)   polyethylene glycol packet 17 g (has no administration in time range)   senna-docusate 8.6-50 mg per tablet 1 tablet (has no administration in time range)   acetaminophen tablet 650 mg (has no administration in time range)   simethicone chewable tablet 80 mg (has no administration in time range)   HYDROcodone-acetaminophen 5-325 mg per tablet 1 tablet (has no administration in time range)   morphine injection 4 mg (has no administration in time range)   naloxone 0.4 mg/mL injection 0.02 mg (has no administration in time range)   glucose chewable tablet 16 g (has no administration in time range)   glucose chewable tablet 24 g (has no administration in time range)   dextrose 50% injection 12.5 g (has no administration in time range)   dextrose 50% injection 25 g (has no administration in time range)   glucagon (human recombinant) injection 1 mg (has no administration in time range)   insulin aspart U-100 pen 0-10 Units (has no administration in time range)   piperacillin-tazobactam 3.375 g in dextrose 5 % 100 mL  IVPB (ready to mix) (has no administration in time range)   sodium bicarbonate 8.4 % (1 mEq/mL) injection 200 mEq (has no administration in time range)   enoxaparin injection 30 mg (has no administration in time range)   methylPREDNISolone sodium succinate injection 125 mg (125 mg Intravenous Given 11/4/23 1157)   furosemide injection 40 mg (40 mg Intravenous Given 11/4/23 1157)   albuterol-ipratropium 2.5 mg-0.5 mg/3 mL nebulizer solution 3 mL (3 mLs Nebulization Given 11/4/23 1127)   cefepime 2 g in dextrose 5% 100 mL IVPB (ready to mix) (2 g Intravenous New Bag 11/4/23 1208)     Medical Decision Making  78-year-old presenting with altered mental status, acute respiratory failure.  She is moving spontaneously.  Hypertension improved after starting the propofol drip.  She is diminished breath sounds, given IV Solu-Medrol and IV Lasix for suspected COPD/CHF exacerbations.  CXR shows ET tube in place, mild-to-moderate pulmonary edema.  ABG shows acute respiratory acidosis with hypercapnia, pH is 7.1 with pCO2 of 105.  Vent settings adjusted.    Amount and/or Complexity of Data Reviewed  Labs: ordered.  Radiology: ordered.    Risk  Prescription drug management.  Decision regarding hospitalization.               ED Course as of 11/04/23 1358   Sat Nov 04, 2023   1143 Patient has a leukocytosis of 23.  Blood cultures ordered, IV cefepime ordered. [BA]   1252 Patient's daughter has arrived.  She says the patient has been complaining flu-like symptoms last few days.  Evidently the daughter was just placed on hospice and does have a DNR/DNI order.  The daughter says that she wants to continue with the intubation and treatment as long as the patient improves and can be extubated, and that her mother would agree with her.  [BA]   1321 I discussed with Dr. Lopez who agrees to the admission. [BA]      ED Course User Index  [BA] Stephen Minaya MD                    Clinical Impression:   Final diagnoses:  [R41.82] Altered  mental status  [R06.02] Shortness of breath  [S80.00XA] Knee contusion  [J96.01] Acute hypoxemic respiratory failure        ED Disposition Condition    Admit Stable                Stephen Minaya MD  11/04/23 4011

## 2023-11-04 NOTE — CARE UPDATE
11/04/23 1111   Patient Assessment/Suction   Level of Consciousness (AVPU) alert   Respiratory Effort Labored;Shallow;Short of breath   Expansion/Accessory Muscles/Retractions accessory muscle use;abdominal muscle use   All Lung Fields Breath Sounds coarse;diminished;equal bilaterally   Rhythm/Pattern, Respiratory assisted mechanically   Cough Frequency with stimulation   Cough Type assisted   Suction Method oral;tracheal   Suction Pressure (mmHg) -120 mmHg   $ Suction Charges Inline Suction Procedure Stat Charge;Sputum Collection   Secretions Amount small   Secretions Color creamy;cloudy;white;red-streaked   Secretions Characteristics thick   Sputum Collection sample obtained per suctioning   $ Swab or suction? Suction   Aspirate Toleration SAMINA (no adverse reactions)   Sent to the lab? Yes   Skin Integrity   $ Wound Care Tech Time 15 min   Area Observed Left;Right;Cheek;Upper lip;Corner lip;Lower lip   Skin Appearance without discoloration   PRE-TX-O2   Device (Oxygen Therapy) ventilator   $ Is the patient on Low Flow Oxygen? Yes   Oxygen Concentration (%) 40   SpO2 100 %   Pulse Oximetry Type Continuous   $ Pulse Oximetry - Multiple Charge Pulse Oximetry - Multiple        Airway - Non-Surgical 11/04/23 1100 Endotracheal Tube   Placement Date/Time: 11/04/23 1100   Present Prior to Hospital Arrival?: Yes  Inserted by: EMS  Airway Device: Endotracheal Tube  Intubated: (c) Other (see comments)  Airway Device Size: 7.5  Style: Cuffed  Cuff Inflation: Minimal occlusive pressure  ...   Secured at 22 cm   Measured At Lips   Secured Location Center   Secured by Commercial tube collins   Position of ETT in xRay In good position   Tube Securement Device Changed? Yes   Bite Block center;secure and patent   Site Condition Cool;Dry   Status Intact;Secured;Patent   Site Assessment Dry;Clean;No bleeding;No drainage   Cuff Volume   (MLT)   Airway Safety   Is Ambu Bag and Mask with Patient? Yes, Adult Ambu Bag and Mask   Suction  set is at the bedside? Yes   Respiratory Interventions   Airway/Ventilation Management airway patency maintained   Vent Select   Conventional Vent Y   Intubation? ER   Does the patient have an artificial airway? Yes   Ventilator Initiated Yes   $ Ventilator Initial 1   Charged w/in last 24h YES   Preset Conventional Ventilator Settings   Vent ID [id number]   Vent Type    Ventilation Type VC   Vent Mode A/C   Humidity HME   Set Rate 28 BPM   Vt Set 350 mL   PEEP/CPAP 5 cmH20   Pressure Support 0 cmH20   Waveform RAMP   Peak Flow 60 L/min   Conventional Ventilator Alarms   Alarms On Y   IHI Ventilator Associated Pneumonia Bundle (Required)   Vent Circut Breaks Minimized Yes   Ready to Wean/Extubation Screen   FIO2<=50 (chart decimal) 0.4   PEEP <=8 (chart #) 5   Education   $ Education Ventilator Oxygen;Suction;15 min

## 2023-11-04 NOTE — NURSING
Nurses Note -- 4 Eyes      11/4/2023   6:08 PM      Skin assessed during: Admit      [] No Altered Skin Integrity Present    []Prevention Measures Documented      [x] Yes- Altered Skin Integrity Present or Discovered   [x] LDA Added if Not in Epic (Describe Wound)   [x] New Altered Skin Integrity was Present on Admit and Documented in LDA   [x] Wound Image Taken    Wound Care Consulted? Yes    Attending Nurse:  Ines Jordan RN/Staff Member:  T Holdsworth, RN

## 2023-11-04 NOTE — PROGRESS NOTES
VANCOMYCIN PHARMACOKINETIC NOTE:  Vancomycin Day # 1    Objective/Assessment:    Diagnosis/Indication for Vancomycin: Bacteremia     78 y.o., female; Actual Body Weight = 74.9 kg (165 lb 2 oz).    The patient has the following labs:  11/4/2023 Estimated Creatinine Clearance: 29 mL/min (A) (based on SCr of 1.6 mg/dL (H)). Lab Results   Component Value Date    BUN 40 (H) 11/04/2023     Lab Results   Component Value Date    WBC 23.08 (H) 11/04/2023        Plan:  Adjust vancomycin dose and/or frequency based on the patient's actual weight and renal function:  Initiate Vancomycin 2g x1 then 500 mg IV every 24 hours.  Orders have been entered into patient's chart.      Vancomycin random level has been ordered for tomorrow 11/5 @ 16:00 (prior to 2nd dose).    Pharmacy will manage vancomycin therapy, monitor serum vancomycin levels, monitor renal function and adjust regimen as necessary.    Thank you for allowing us to participate in this patient's care.     Denver Marques 11/4/2023 5:01 PM  Department of Pharmacy  Ext [phone]

## 2023-11-04 NOTE — PROGRESS NOTES
Pharmacist Renal Dose Adjustment Note    Marisol Kerr is a 78 y.o. female being treated with the medication Enoxaparin.    Patient Data:    Vital Signs (Most Recent):  Temp: 96.8 °F (36 °C) (11/04/23 1127)  Pulse: 92 (11/04/23 1320)  Resp: (!) 28 (11/04/23 1127)  BP: 126/66 (11/04/23 1320)  SpO2: 98 % (11/04/23 1320) Vital Signs (72h Range):  Temp:  [96.8 °F (36 °C)]   Pulse:  [81-92]   Resp:  [16-28]   BP: (126-191)/(63-83)   SpO2:  [90 %-100 %]      Recent Labs   Lab 11/04/23  1057   CREATININE 1.6*     Serum creatinine: 1.6 mg/dL (H) 11/04/23 1057  Estimated creatinine clearance: 29.4 mL/min (A)    Enoxaparin 40 mg subq every 24 hours will be changed to Enoxaparin 30 mg subq every 24 hours due to CrCl < 30 ml/min.    Pharmacist's Name: Kathrine Hernandez  Pharmacist's Extension: [phone]

## 2023-11-04 NOTE — H&P
"[facility] Medicine   History & Physical     Patient Name: Marisol Kerr  MRN: [id number]  Admission Date: 11/4/2023 10:45 AM  Attending Physician: Vidya Lopez MD  Face-to-Face encounter date: 11/04/2023 1:40 PM    Patient information was obtained from patient, past medical records, ER physician, and ER records.     HISTORY OF PRESENT ILLNESS:     Marisol Kerr is a 78 y.o. White female   With PMH of HFpEF, COPD,   CAD, DM2, HTN,   who presents with SOB.    Pt currently intubated  Per EMS report, pt was found gasping for air  Said "help me, help me"  then went unresponsive  She was seated, no head trauma  Pt intubated before arrival to ER  No cardiac arrest occured    Pt was DNR, DNI + on hospice  but hospice + DNR/DNI has been revoked by her daughter    REVIEW OF SYSTEMS:     Unable to obtain:  intubated    PAST MEDICAL HISTORY:     Past Medical History:   Diagnosis Date    CAD (coronary artery disease)     Cancer     colon    CHF (congestive heart failure)     Colitis     Colon cancer     COPD (chronic obstructive pulmonary disease)     Decreased hearing     Diabetes mellitus     Diabetes mellitus, type 2     Hypercholesterolemia     Hypertension     Hypoxemia     Insomnia     Obesity     Osteoarthritis        PAST SURGICAL HISTORY:     Past Surgical History:   Procedure Laterality Date    ANGIOGRAM, CORONARY, WITH LEFT HEART CATHETERIZATION N/A 2/10/2022    Procedure: Angiogram, Coronary, with Left Heart Cath;  Surgeon: Cristian Benjamin MD;  Location: [facility] CATH/EP LAB;  Service: Cardiology;  Laterality: N/A;    CARDIAC CATHETERIZATION      CHOLECYSTECTOMY      COLECTOMY      CORONARY ANGIOGRAPHY N/A 8/29/2023    Procedure: ANGIOGRAM, CORONARY ARTERY;  Surgeon: Nba Riggs MD;  Location: [facility] CATH LAB;  Service: Cardiology;  Laterality: N/A;    CORONARY ANGIOPLASTY      CORONARY STENT PLACEMENT      ERCP N/A 1/16/2023    Procedure: ERCP (ENDOSCOPIC RETROGRADE " CHOLANGIOPANCREATOGRAPHY);  Surgeon: Biju Kramer III, MD;  Location: [facility] ENDO;  Service: Endoscopy;  Laterality: N/A;    ESOPHAGOGASTRODUODENOSCOPY N/A 2023    Procedure: EGD (ESOPHAGOGASTRODUODENOSCOPY);  Surgeon: Aarti Alvarez MD;  Location: [facility] ENDO;  Service: Endoscopy;  Laterality: N/A;    EXTERNAL EAR SURGERY      EYE SURGERY      FLEXIBLE SIGMOIDOSCOPY N/A 2019    Procedure: SIGMOIDOSCOPY, FLEXIBLE;  Surgeon: Biju Kramer III, MD;  Location: [facility] ENDO;  Service: Endoscopy;  Laterality: N/A;    HYSTERECTOMY      partial    INSTANTANEOUS WAVE-FREE RATIO (IFR) N/A 2023    Procedure: Instantaneous Wave-Free Ratio (IFR);  Surgeon: Nba Riggs MD;  Location: [facility] CATH LAB;  Service: Cardiology;  Laterality: N/A;    STENT, DRUG ELUTING, SINGLE VESSEL, CORONARY N/A 2023    Procedure: Stent, Drug Eluting, Single Vessel, Coronary;  Surgeon: Nba Riggs MD;  Location: [facility] CATH LAB;  Service: Cardiology;  Laterality: N/A;       ALLERGIES:   Iodinated contrast media and Strawberries [strawberry]    FAMILY HISTORY:     Family History   Problem Relation Age of Onset    Febrile seizures Mother     Heart failure Father        SOCIAL HISTORY:     Social History     Tobacco Use    Smoking status: Former     Current packs/day: 0.00     Average packs/day: 3.0 packs/day for 50.0 years (150.1 ttl pk-yrs)     Types: Cigarettes     Start date: 3/30/1964     Quit date: 2006     Years since quittin.6    Smokeless tobacco: Never    Tobacco comments:     ex smoker 15 years   Substance Use Topics    Alcohol use: Yes        Social History     Substance and Sexual Activity   Sexual Activity Never        HOME MEDICATIONS:     Prior to Admission medications    Medication Sig Start Date End Date Taking? Authorizing Provider   albuterol (VENTOLIN HFA) 90 mcg/actuation inhaler Inhale 2 puffs into the lungs every 6 (six) hours as needed for Wheezing or Shortness of Breath. Rescue  1/27/22   Antonieta Marquez NP   albuterol-ipratropium (DUO-NEB) 2.5 mg-0.5 mg/3 mL nebulizer solution Take 3 mLs by nebulization every 4 (four) hours as needed for Wheezing or Shortness of Breath. Rescue 6/27/23 6/26/24  Khadar Dwyer MD   allopurinoL (ZYLOPRIM) 100 MG tablet Take 0.5 tablets (50 mg total) by mouth once daily. 6/27/23   Khadar Dwyer MD   ALPRAZolam (XANAX) 0.25 MG tablet Take 1 tablet (0.25 mg total) by mouth every evening. 6/27/23 11/4/23  Khadar Dwyer MD   amLODIPine (NORVASC) 10 MG tablet Take 1 tablet (10 mg total) by mouth once daily. 8/31/23 8/30/24  Becca Jacobson MD   aspirin 81 MG Chew Chew and swallow 1 tablet (81 mg total) by mouth once daily. 8/31/23 8/30/24  Becca Jacobson MD   atorvastatin (LIPITOR) 40 MG tablet Take 1 tablet (40 mg total) by mouth once daily. 4/24/23 11/4/23  Khadar Dwyer MD   cholestyramine-aspartame (QUESTRAN/PREVALITE LIGHT) 4 gram Powd Take 4 g by mouth once daily. 9/5/23 9/4/24  Kristi, Son, DO   clopidogreL (PLAVIX) 75 mg tablet Take 1 tablet (75 mg total) by mouth once daily. 8/30/23 8/29/24  Becca Jacobson MD   coenzyme Q10 100 mg capsule Take 100 mg by mouth every morning.    Provider, Historical   dexAMETHasone (DECADRON) 4 MG Tab Take 4 mg by mouth Daily. 10/28/23   Provider, Historical   ergocalciferol (VITAMIN D2) 50,000 unit Cap Take 1 capsule (50,000 Units total) by mouth every 7 days. 4/24/23   Khadar Dwyer MD   ferrous sulfate 325 (65 FE) MG EC tablet Take 325 mg by mouth once daily.    Provider, Historical   fish oil-omega-3 fatty acids 300-1,000 mg capsule Take 2 capsules by mouth every morning.    Provider, Historical   furosemide (LASIX) 20 MG tablet Take 1 tablet (20 mg total) by mouth every other day. TAKE WITH POTASSIUM 8/1/23 7/31/24  Khadar Dwyer MD   gabapentin (NEURONTIN) 300 MG capsule Take 300 mg by mouth Daily. 10/24/23   Provider, Historical   ipratropium-albuteroL (COMBIVENT RESPIMAT)   mcg/actuation inhaler Inhale 2 puffs into the lungs every 4 (four) hours as needed for Shortness of Breath. Rescue 6/27/23   Khadar Dwyer MD   ketoconazole (NIZORAL) 2 % cream Apply 1 application  topically 2 (two) times daily.    Provider, Historical   magnesium oxide (MAG-OX) 400 mg (241.3 mg magnesium) tablet Take 1 tablet by mouth 2 (two) times daily. 2/13/23   Provider, Historical   metoprolol succinate (TOPROL-XL) 200 MG 24 hr tablet Take 1 tablet (200 mg total) by mouth once daily. 8/30/23 8/29/24  Becca Jacobson MD   mupirocin (BACTROBAN) 2 % ointment Apply topically 2 (two) times daily.  Patient taking differently: Apply 1 g topically 2 (two) times daily. 3/28/23   Khadar Dwyer MD   nitroGLYCERIN (NITROSTAT) 0.4 MG SL tablet Place 1 tablet (0.4 mg total) under the tongue every 5 (five) minutes as needed for Chest pain. Up to 3 doses. If chest pain is not relieved or worsens 3 to 5 minutes after 1 dose, call 911 and seek immediate emergency medical attention. 8/30/23 8/29/24  Becca Jacobson MD   pantoprazole (PROTONIX) 40 MG tablet Take 1 tablet (40 mg total) by mouth once daily. 6/27/23 6/26/24  Khadar Dwyer MD   potassium chloride (MICRO-K) 10 MEQ CpSR Take 1 capsule (10 mEq total) by mouth every other day. (TAKE WITH LASIX/FUROSEMIDE) 7/29/23   Vidya Lopez MD   triamcinolone acetonide 0.1% (KENALOG) 0.1 % cream Apply 1 g topically 2 (two) times daily.    Provider, Historical   umeclidinium (INCRUSE ELLIPTA) 62.5 mcg/actuation inhalation capsule Inhale 62.5 mcg into the lungs every morning. Controller 6/27/23   Khadar Dwyer MD   vit C/E/Zn/coppr/lutein/zeaxan (PRESERVISION AREDS-2 ORAL) Take 1 tablet by mouth once daily.    Provider, Historical   VITAMIN C 500 MG tablet Take 500 mg by mouth 2 (two) times daily. 2/3/23   Provider, Historical   benazepriL (LOTENSIN) 40 MG tablet Take 1 tablet (40 mg total) by mouth once daily. 1/6/22 2/21/22  Cristian Benjamin MD  "  cimetidine (TAGAMET) 300 MG tablet Take 1 tablet (300 mg total) by mouth every 6 (six) hours. Take 1 tablet (300 mg total) by mouth starting at 6 PM on Sunday April 17, 2022 (the evening before your angiogram procedure). Then take 1 tablet at 12 AM, then take 1 tablet at 6 AM on Monday April 18th. 4/17/22 4/22/22  Yesenia Frazier MD   fluticasone-salmeterol diskus inhaler 250-50 mcg Inhale 1 puff into the lungs 2 (two) times a day.  11/4/23  Provider, Historical   lisinopriL 10 MG tablet Take 1 tablet (10 mg total) by mouth once daily. HOLD UNTIL OTHERWISE DIRECTED BY PRIMARY CARE PROVIDER 3/31/22 4/19/22  Natalie Clemons NP   lovastatin (MEVACOR) 40 MG tablet Take 1 tablet (40 mg total) by mouth every other day. 3/31/22 4/7/22  Natalie Clemons NP       PHYSICAL EXAM:     /66   Pulse 92   Temp 96.8 °F (36 °C) (Rectal)   Resp (!) 28   Ht 5' 2.99" (1.6 m)   Wt 76.9 kg (169 lb 8.5 oz)   SpO2 98%   BMI 30.04 kg/m²     Gen: sedated  HEENT:  Eyes - no pallor  External ears with no lesions  Nares patent  Mouth/Throat:  trachea midline, INTUBATED  CV: RRR  Lungs: RALES, ON MV  Abd: +BS, soft, NT, ND  Ext: no atrophy; +BLE EDEMA WITH LE ERYTHEMA  Skin: warm, dry, CONTUSIONS THROUGHOUT  Neuro: SEDATED  Psych: SEDATED    LABS AND IMAGING:     Labs Reviewed   CBC W/ AUTO DIFFERENTIAL - Abnormal; Notable for the following components:       Result Value    WBC 23.08 (*)     RBC 3.59 (*)     Hemoglobin 9.8 (*)     Hematocrit 35.4 (*)     MCV 99 (*)     MCHC 27.7 (*)     Gran % 82.0 (*)     Lymph % 14.0 (*)     Mono % 2.0 (*)     All other components within normal limits   COMPREHENSIVE METABOLIC PANEL - Abnormal; Notable for the following components:    Potassium 6.2 (*)     CO2 36 (*)     Glucose 158 (*)     BUN 38 (*)     Creatinine 1.6 (*)     eGFR 33.2 (*)     Anion Gap 0 (*)     All other components within normal limits   TROPONIN I HIGH SENSITIVITY - Abnormal; Notable for the following " components:    Troponin I High Sensitivity 26.7 (*)     All other components within normal limits   B-TYPE NATRIURETIC PEPTIDE - Abnormal; Notable for the following components:    BNP 1,995 (*)     All other components within normal limits   URINALYSIS, REFLEX TO URINE CULTURE - Abnormal; Notable for the following components:    Appearance, UA Hazy (*)     Protein, UA 2+ (*)     Urobilinogen, UA 2.0-3.0 (*)     All other components within normal limits    Narrative:     Specimen Source->Urine   URINALYSIS MICROSCOPIC - Abnormal; Notable for the following components:    RBC, UA 5 (*)     Hyaline Casts, UA 27 (*)     All other components within normal limits    Narrative:     Specimen Source->Urine   ISTAT PROCEDURE - Abnormal; Notable for the following components:    POC PH 7.141 (*)     POC PCO2 105.8 (*)     POC PO2 528 (*)     POC HCO3 36.1 (*)     POC BE 7 (*)     POC Glucose 155 (*)     POC Potassium 5.9 (*)     POC TCO2 39 (*)     POC Hematocrit 33 (*)     All other components within normal limits   ISTAT CREATININE - Abnormal; Notable for the following components:    POC Creatinine 1.7 (*)     All other components within normal limits   ISTAT PROCEDURE - Abnormal; Notable for the following components:    POC PH 7.259 (*)     POC PCO2 73.3 (*)     POC PO2 72 (*)     POC HCO3 32.9 (*)     POC BE 6 (*)     POC TCO2 35 (*)     All other components within normal limits   CULTURE, RESPIRATORY   CULTURE, BLOOD   CULTURE, BLOOD   MRSA SCREEN BY PCR   LACTIC ACID, PLASMA   INFLUENZA A AND B ANTIGEN    Narrative:     Specimen Source->Nasopharyngeal Swab   SARS-COV-2 RNA AMPLIFICATION, QUAL   PROCALCITONIN   PROCALCITONIN   BASIC METABOLIC PANEL   POCT GLUCOSE MONITORING CONTINUOUS       Imaging Results              X-Ray Knee 1 or 2 View Right (Final result)  Result time 11/04/23 11:55:53      Final result by García Russell MD (11/04/23 11:55:53)                   Narrative:    CLINICAL HISTORY:  78 years  (1945) Female (Found unresponsive in wheelchair at home; )    TECHNIQUE:  XR KNEE 1-2 VIEWS RIGHT. 2 view(s) obtained .    COMPARISON:  None available.    FINDINGS:  There is diffusely decreased osseous mineralization (suggesting osteoporosis/osteopenia) which limits evaluation for subtle fractures, that being said no acute displaced fracture or dislocation is seen. The joints and interspaces are maintained. There is a trace knee joint effusion in the suprapatellar recess. Faint calcification along the medial and lateral joint line suggesting chondrocalcinosis/CPPD. As the calcifications are seen in the arteries of the popliteal fossa. Faint popcorn-like calcification is seen in the medial metadiaphysis of the distal femur suggesting an enchondroma or bone infarct. Soft tissues are radiographically within normal limits and no radiopaque foreign body is seen.    IMPRESSION:  No acute osseous abnormality.                  .    Electronically signed by:  García Russell MD  11/04/2023 11:55 AM CDT Workstation: [id number]                                     X-Ray Chest AP Portable (Final result)  Result time 11/04/23 11:12:13      Final result by García Russell MD (11/04/23 11:12:13)                   Narrative:    CLINICAL HISTORY:  78 years (1945) Female Found unresponsive in wheelchair at home; Hx of cad, chf, copd, colon cancer    TECHNIQUE:  Portable AP radiograph the chest. One view.    COMPARISON:  Radiograph from August 26, 2023.    FINDINGS:  Mild diffuse interstitial opacity throughout both lungs with a slight basilar predominance. There is blunting of both costophrenic angles consistent with small right and trace left pleural effusions and adjacent atelectasis. No pneumothorax is identified. The heart is mildly enlarged.  Osseous structures show degenerative changes in the spine. The visualized upper abdomen is unremarkable.    Lines and tubes: Endotracheal tube with tip projecting  approximately 6 and meter from the juan j.    IMPRESSION:  Cardiomegaly and findings of mild interstitial pulmonary edema in the mid-lower lung zones.                  .            Electronically signed by:  García Russell MD  11/04/2023 11:12 AM CDT Workstation: [id number]                                    Labs and images reviewed personally by me.  See my personal assessment/interpretation of results below:    ASSESSMENT & PLAN:   Marisol Kerr is a 78 y.o. female admitted for    Active Hospital Problems    Diagnosis  POA    *Acute on chronic heart failure with preserved ejection fraction (HFpEF) [I50.33]  Yes    Acute respiratory failure with hypoxia and hypercarbia [J96.01, J96.02]  Yes    Suspected Pneumonia [J18.9]  Yes    COPD exacerbation [J44.1]  Yes    Hyperkalemia [E87.5]  Yes    Leukocytosis [D72.829]  Yes    Chronic diastolic congestive heart failure [I50.32]  Yes    COPD/emphysema [J44.9]  Yes     Chronic    Chronic kidney disease, stage 4 (severe) [N18.4]  Yes    DM type 2, controlled, with complication [E11.8]  Yes    Essential hypertension, benign [I10]  Yes      Resolved Hospital Problems   No resolved problems to display.        Plan    Acute hypercapnic hypoxemic respiratory failure  COPD exacerbation  Acute on chronic HFpEF  ?Pneumonia  Hyperkalemia  CKD4  - continue on mechanical ventilation  - temporizing measures for hyperkalemia  - trending BMP  - empiric antibiotics  - infection workup in progress  - follow cultures  - solumedrol, nebs  - lasix  - strict I/Os, daily weights  - D-dimer  - pulmonology consulted, thank you    DM2  - SSI    Chronic conditions as noted above/below; home medications reviewed personally by me and restarted as appropriate  Electrolyte derangement:  Trending BMP; Mg; replacement prn  DVT ppx: lovenox   FULL CODE confirmed by pt's family    - The above conditions include an acute and/or chronic illness that poses a threat to life (or bodily function).  - Previous  notes/encounters/external records reviewed personally by me.  - Pt's case personally discussed with another physician:  ER physician    Vidya Lopez MD  [facility] Hospitalist  11/04/2023

## 2023-11-04 NOTE — CARE UPDATE
11/04/23 1513        Airway - Non-Surgical 11/04/23 1100 Endotracheal Tube   Placement Date/Time: 11/04/23 1100   Present Prior to Hospital Arrival?: Yes  Inserted by: EMS  Airway Device: Endotracheal Tube  Intubated: (c) Other (see comments)  Airway Device Size: 7.5  Style: Cuffed  Cuff Inflation: Minimal occlusive pressure  ...   Secured at 21 cm   Measured At Lips   Secured Location Right   Secured by Commercial tube collins   Bite Block left   Site Condition Cool;Dry   Status Intact;Secured;Patent   Site Assessment Clean;Dry   Cuff Volume   (MLT)   Airway Safety   Is Ambu Bag and Mask with Patient? Yes, Adult Ambu Bag and Mask   Vent Select   Conventional Vent Y   Charged w/in last 24h YES   Preset Conventional Ventilator Settings   Vent ID 5   Vent Type    Patient Ventilator Parameters   Tubing ID (mm) 7.6 mm   Tube Type ET   Conventional Ventilator Alarms   Alarms On Y   Ve High Alarm 15 L/min   Ve Low Alarm 2 L/min   Vt High Alarm 76499 mL   Vt Low Alarm 200 mL

## 2023-11-04 NOTE — CARE UPDATE
11/04/23 1055   PRE-TX-O2   SpO2 (!) 90 %   Pulse 88   Resp (!) 27   BP (!) 176/82   Blood Gas Puncture   Blood Gas Type arterial   Arterial Site left;radial artery   Collateral Circulation Verified John's Test   Site Preparation alcohol   Pressure Held yes   Distal Pulse Present yes   Hematoma Present no   Sample Obtained/Sent to Lab yes   Oxygen Amount FiO2 (specify)  (100)   Number of Attempts for ABG? 1   Unsuccessful ABG Attempts  0   Attempted By? treasure kumari rt   Labs   $ Was an ABG obtained? Arterial Puncture;ISTAT - Blood gas;ISTAT - Calcium;ISTAT - Hematocrit;ISTAT - Potassium;ISTAT - Sodium   $ Labs Tech Time 15 min   Critical Value Communication   Date Result Received 11/04/23   Time Result Received 1059   Resulting Department of Critical Value respiratory   Who communicated critical value from resulting department? treasure kumari, rt   Critical Test #1 pH   Critical Test #1 Result 7.141   Critical Test #2 PCO2   Critical Test #2 Result 105.8   Name of Notified Physician/Designee Dr. Minaya   Date Notified 11/04/23   Time Notified 1059   Read Back Verification Yes   Physician Directive rate increased to 28, fio2 decreased to 40%/ repeat gas in an hour

## 2023-11-04 NOTE — PROGRESS NOTES
Renal Dosing of Medication    An order has been entered by a pharmacist to adjust the dose and/or frequency of the medication listed below based on the patient's renal function.    Objective:  78 y.o., female, Actual Body Weight = 74.9 kg (165 lb 2 oz)    Current Regimen:  Lovenox 70mg q12h    Assessment:  Estimated Creatinine Clearance: 29 mL/min (A) (based on SCr of 1.6 mg/dL (H)).  Renal function is poor.    Plan:  Orders have been entered to adjust the dose and/or frequency based on the patient's weight and renal function:  Lovenox 70 mg SQ every 24 hours.    Thank you for allowing us to participate in this patient's care.     Denver Marques 11/4/2023 4:40 PM  Department of Pharmacy  Ext [phone]

## 2023-11-05 PROBLEM — J44.9 COPD (CHRONIC OBSTRUCTIVE PULMONARY DISEASE): Chronic | Status: RESOLVED | Noted: 2019-01-16 | Resolved: 2023-11-05

## 2023-11-05 LAB
ALBUMIN SERPL BCP-MCNC: 2.9 G/DL (ref 3.5–5.2)
ALLENS TEST: ABNORMAL
ALP SERPL-CCNC: 57 U/L (ref 55–135)
ALT SERPL W/O P-5'-P-CCNC: 9 U/L (ref 10–44)
ANION GAP SERPL CALC-SCNC: 12 MMOL/L (ref 8–16)
ANION GAP SERPL CALC-SCNC: 7 MMOL/L (ref 8–16)
ANION GAP SERPL CALC-SCNC: 8 MMOL/L (ref 8–16)
ANION GAP SERPL CALC-SCNC: 9 MMOL/L (ref 8–16)
AST SERPL-CCNC: 15 U/L (ref 10–40)
BASOPHILS # BLD AUTO: 0.03 K/UL (ref 0–0.2)
BASOPHILS NFR BLD: 0.2 % (ref 0–1.9)
BILIRUB SERPL-MCNC: 0.3 MG/DL (ref 0.1–1)
BUN SERPL-MCNC: 43 MG/DL (ref 8–23)
BUN SERPL-MCNC: 44 MG/DL (ref 8–23)
BUN SERPL-MCNC: 45 MG/DL (ref 8–23)
CALCIUM SERPL-MCNC: 8.4 MG/DL (ref 8.7–10.5)
CALCIUM SERPL-MCNC: 8.8 MG/DL (ref 8.7–10.5)
CALCIUM SERPL-MCNC: 8.8 MG/DL (ref 8.7–10.5)
CHLORIDE SERPL-SCNC: 100 MMOL/L (ref 95–110)
CHLORIDE SERPL-SCNC: 101 MMOL/L (ref 95–110)
CHLORIDE SERPL-SCNC: 101 MMOL/L (ref 95–110)
CHLORIDE SERPL-SCNC: 102 MMOL/L (ref 95–110)
CHLORIDE SERPL-SCNC: 98 MMOL/L (ref 95–110)
CHLORIDE SERPL-SCNC: 99 MMOL/L (ref 95–110)
CO2 SERPL-SCNC: 30 MMOL/L (ref 23–29)
CO2 SERPL-SCNC: 34 MMOL/L (ref 23–29)
CO2 SERPL-SCNC: 35 MMOL/L (ref 23–29)
CREAT SERPL-MCNC: 1.6 MG/DL (ref 0.5–1.4)
DELSYS: ABNORMAL
DIFFERENTIAL METHOD: ABNORMAL
EOSINOPHIL # BLD AUTO: 0 K/UL (ref 0–0.5)
EOSINOPHIL NFR BLD: 0.1 % (ref 0–8)
ERYTHROCYTE [DISTWIDTH] IN BLOOD BY AUTOMATED COUNT: 14.8 % (ref 11.5–14.5)
ERYTHROCYTE [SEDIMENTATION RATE] IN BLOOD BY WESTERGREN METHOD: 28 MM/H
EST. GFR  (NO RACE VARIABLE): 33.2 ML/MIN/1.73 M^2
GLUCOSE SERPL-MCNC: 106 MG/DL (ref 70–110)
GLUCOSE SERPL-MCNC: 106 MG/DL (ref 70–110)
GLUCOSE SERPL-MCNC: 148 MG/DL (ref 70–110)
GLUCOSE SERPL-MCNC: 152 MG/DL (ref 70–110)
GLUCOSE SERPL-MCNC: 164 MG/DL (ref 70–110)
GLUCOSE SERPL-MCNC: 171 MG/DL (ref 70–110)
GLUCOSE SERPL-MCNC: 171 MG/DL (ref 70–110)
GLUCOSE SERPL-MCNC: 180 MG/DL (ref 70–110)
GLUCOSE SERPL-MCNC: 80 MG/DL (ref 70–110)
HCO3 UR-SCNC: 37.2 MMOL/L (ref 24–28)
HCT VFR BLD AUTO: 29.8 % (ref 37–48.5)
HGB BLD-MCNC: 8.6 G/DL (ref 12–16)
IMM GRANULOCYTES # BLD AUTO: 0.08 K/UL (ref 0–0.04)
IMM GRANULOCYTES NFR BLD AUTO: 0.5 % (ref 0–0.5)
LYMPHOCYTES # BLD AUTO: 1.2 K/UL (ref 1–4.8)
LYMPHOCYTES NFR BLD: 7.8 % (ref 18–48)
MAGNESIUM SERPL-MCNC: 2.3 MG/DL (ref 1.6–2.6)
MCH RBC QN AUTO: 27.7 PG (ref 27–31)
MCHC RBC AUTO-ENTMCNC: 28.9 G/DL (ref 32–36)
MCV RBC AUTO: 96 FL (ref 82–98)
MODE: ABNORMAL
MONOCYTES # BLD AUTO: 1.9 K/UL (ref 0.3–1)
MONOCYTES NFR BLD: 11.9 % (ref 4–15)
NEUTROPHILS # BLD AUTO: 12.5 K/UL (ref 1.8–7.7)
NEUTROPHILS NFR BLD: 79.5 % (ref 38–73)
NRBC BLD-RTO: 0 /100 WBC
PCO2 BLDA: 51.6 MMHG (ref 35–45)
PEEP: 5
PH SMN: 7.46 [PH] (ref 7.35–7.45)
PHOSPHATE SERPL-MCNC: 2.5 MG/DL (ref 2.7–4.5)
PHOSPHATE SERPL-MCNC: 4.2 MG/DL (ref 2.7–4.5)
PLATELET # BLD AUTO: 180 K/UL (ref 150–450)
PMV BLD AUTO: 12.4 FL (ref 9.2–12.9)
PO2 BLDA: 75 MMHG (ref 80–100)
POC BE: 13 MMOL/L
POC SATURATED O2: 95 % (ref 95–100)
POC TCO2: 39 MMOL/L (ref 23–27)
POTASSIUM SERPL-SCNC: 5 MMOL/L (ref 3.5–5.1)
POTASSIUM SERPL-SCNC: 5.2 MMOL/L (ref 3.5–5.1)
POTASSIUM SERPL-SCNC: 5.2 MMOL/L (ref 3.5–5.1)
POTASSIUM SERPL-SCNC: 5.4 MMOL/L (ref 3.5–5.1)
POTASSIUM SERPL-SCNC: 5.6 MMOL/L (ref 3.5–5.1)
POTASSIUM SERPL-SCNC: 5.6 MMOL/L (ref 3.5–5.1)
PROT SERPL-MCNC: 5.5 G/DL (ref 6–8.4)
RBC # BLD AUTO: 3.11 M/UL (ref 4–5.4)
SAMPLE: ABNORMAL
SITE: ABNORMAL
SODIUM SERPL-SCNC: 141 MMOL/L (ref 136–145)
SODIUM SERPL-SCNC: 142 MMOL/L (ref 136–145)
SODIUM SERPL-SCNC: 142 MMOL/L (ref 136–145)
SODIUM SERPL-SCNC: 144 MMOL/L (ref 136–145)
TROPONIN I SERPL HS-MCNC: 1022.5 PG/ML (ref 0–14.9)
VANCOMYCIN SERPL-MCNC: 17.3 UG/ML
VT: 350
WBC # BLD AUTO: 15.68 K/UL (ref 3.9–12.7)

## 2023-11-05 PROCEDURE — 25000242 PHARM REV CODE 250 ALT 637 W/ HCPCS: Performed by: FAMILY MEDICINE

## 2023-11-05 PROCEDURE — 83735 ASSAY OF MAGNESIUM: CPT | Performed by: FAMILY MEDICINE

## 2023-11-05 PROCEDURE — 94760 N-INVAS EAR/PLS OXIMETRY 1: CPT | Mod: XB

## 2023-11-05 PROCEDURE — 36406 VNPNXR<3YRS PHY/QHP OTHER VN: CPT

## 2023-11-05 PROCEDURE — 93005 ELECTROCARDIOGRAM TRACING: CPT | Performed by: INTERNAL MEDICINE

## 2023-11-05 PROCEDURE — 36415 COLL VENOUS BLD VENIPUNCTURE: CPT | Performed by: STUDENT IN AN ORGANIZED HEALTH CARE EDUCATION/TRAINING PROGRAM

## 2023-11-05 PROCEDURE — 99900031 HC PATIENT EDUCATION (STAT)

## 2023-11-05 PROCEDURE — 99900035 HC TECH TIME PER 15 MIN (STAT)

## 2023-11-05 PROCEDURE — 84100 ASSAY OF PHOSPHORUS: CPT | Performed by: FAMILY MEDICINE

## 2023-11-05 PROCEDURE — 94003 VENT MGMT INPAT SUBQ DAY: CPT

## 2023-11-05 PROCEDURE — 82803 BLOOD GASES ANY COMBINATION: CPT

## 2023-11-05 PROCEDURE — 25000003 PHARM REV CODE 250: Performed by: INTERNAL MEDICINE

## 2023-11-05 PROCEDURE — 63600175 PHARM REV CODE 636 W HCPCS: Performed by: STUDENT IN AN ORGANIZED HEALTH CARE EDUCATION/TRAINING PROGRAM

## 2023-11-05 PROCEDURE — 20000000 HC ICU ROOM

## 2023-11-05 PROCEDURE — 25000003 PHARM REV CODE 250: Performed by: STUDENT IN AN ORGANIZED HEALTH CARE EDUCATION/TRAINING PROGRAM

## 2023-11-05 PROCEDURE — 99900026 HC AIRWAY MAINTENANCE (STAT)

## 2023-11-05 PROCEDURE — 63600175 PHARM REV CODE 636 W HCPCS: Performed by: INTERNAL MEDICINE

## 2023-11-05 PROCEDURE — 84484 ASSAY OF TROPONIN QUANT: CPT | Performed by: STUDENT IN AN ORGANIZED HEALTH CARE EDUCATION/TRAINING PROGRAM

## 2023-11-05 PROCEDURE — 36600 WITHDRAWAL OF ARTERIAL BLOOD: CPT

## 2023-11-05 PROCEDURE — 63600175 PHARM REV CODE 636 W HCPCS: Performed by: FAMILY MEDICINE

## 2023-11-05 PROCEDURE — 93010 EKG 12-LEAD: ICD-10-PCS | Mod: ,,, | Performed by: INTERNAL MEDICINE

## 2023-11-05 PROCEDURE — 36415 COLL VENOUS BLD VENIPUNCTURE: CPT | Performed by: FAMILY MEDICINE

## 2023-11-05 PROCEDURE — 85025 COMPLETE CBC W/AUTO DIFF WBC: CPT | Performed by: FAMILY MEDICINE

## 2023-11-05 PROCEDURE — 94799 UNLISTED PULMONARY SVC/PX: CPT

## 2023-11-05 PROCEDURE — 99291 CRITICAL CARE FIRST HOUR: CPT | Mod: ,,, | Performed by: INTERNAL MEDICINE

## 2023-11-05 PROCEDURE — 80053 COMPREHEN METABOLIC PANEL: CPT | Performed by: FAMILY MEDICINE

## 2023-11-05 PROCEDURE — 94761 N-INVAS EAR/PLS OXIMETRY MLT: CPT

## 2023-11-05 PROCEDURE — C1751 CATH, INF, PER/CENT/MIDLINE: HCPCS

## 2023-11-05 PROCEDURE — 84100 ASSAY OF PHOSPHORUS: CPT | Mod: 91 | Performed by: FAMILY MEDICINE

## 2023-11-05 PROCEDURE — 27000221 HC OXYGEN, UP TO 24 HOURS

## 2023-11-05 PROCEDURE — 94640 AIRWAY INHALATION TREATMENT: CPT

## 2023-11-05 PROCEDURE — 93010 ELECTROCARDIOGRAM REPORT: CPT | Mod: ,,, | Performed by: INTERNAL MEDICINE

## 2023-11-05 PROCEDURE — C9113 INJ PANTOPRAZOLE SODIUM, VIA: HCPCS | Performed by: FAMILY MEDICINE

## 2023-11-05 PROCEDURE — 80048 BASIC METABOLIC PNL TOTAL CA: CPT | Mod: 91 | Performed by: FAMILY MEDICINE

## 2023-11-05 PROCEDURE — 99291 PR CRITICAL CARE, E/M 30-74 MINUTES: ICD-10-PCS | Mod: ,,, | Performed by: INTERNAL MEDICINE

## 2023-11-05 PROCEDURE — 80202 ASSAY OF VANCOMYCIN: CPT | Performed by: FAMILY MEDICINE

## 2023-11-05 PROCEDURE — 25000003 PHARM REV CODE 250: Performed by: FAMILY MEDICINE

## 2023-11-05 RX ORDER — POTASSIUM CHLORIDE 7.45 MG/ML
40 INJECTION INTRAVENOUS
Status: DISCONTINUED | OUTPATIENT
Start: 2023-11-05 | End: 2023-11-13 | Stop reason: HOSPADM

## 2023-11-05 RX ORDER — CALCIUM GLUCONATE 20 MG/ML
1 INJECTION, SOLUTION INTRAVENOUS
Status: DISCONTINUED | OUTPATIENT
Start: 2023-11-05 | End: 2023-11-13 | Stop reason: HOSPADM

## 2023-11-05 RX ORDER — MAGNESIUM SULFATE HEPTAHYDRATE 40 MG/ML
2 INJECTION, SOLUTION INTRAVENOUS
Status: DISCONTINUED | OUTPATIENT
Start: 2023-11-05 | End: 2023-11-13 | Stop reason: HOSPADM

## 2023-11-05 RX ORDER — SODIUM,POTASSIUM PHOSPHATES 280-250MG
1 POWDER IN PACKET (EA) ORAL ONCE
Status: DISCONTINUED | OUTPATIENT
Start: 2023-11-05 | End: 2023-11-13 | Stop reason: HOSPADM

## 2023-11-05 RX ORDER — DEXMEDETOMIDINE HYDROCHLORIDE 4 UG/ML
0-1.4 INJECTION, SOLUTION INTRAVENOUS CONTINUOUS
Status: DISCONTINUED | OUTPATIENT
Start: 2023-11-05 | End: 2023-11-08

## 2023-11-05 RX ORDER — POTASSIUM CHLORIDE 7.45 MG/ML
60 INJECTION INTRAVENOUS
Status: DISCONTINUED | OUTPATIENT
Start: 2023-11-05 | End: 2023-11-13 | Stop reason: HOSPADM

## 2023-11-05 RX ORDER — POTASSIUM CHLORIDE 7.45 MG/ML
80 INJECTION INTRAVENOUS
Status: DISCONTINUED | OUTPATIENT
Start: 2023-11-05 | End: 2023-11-13 | Stop reason: HOSPADM

## 2023-11-05 RX ORDER — CALCIUM GLUCONATE 20 MG/ML
3 INJECTION, SOLUTION INTRAVENOUS
Status: DISCONTINUED | OUTPATIENT
Start: 2023-11-05 | End: 2023-11-13 | Stop reason: HOSPADM

## 2023-11-05 RX ORDER — CALCIUM GLUCONATE 20 MG/ML
2 INJECTION, SOLUTION INTRAVENOUS
Status: DISCONTINUED | OUTPATIENT
Start: 2023-11-05 | End: 2023-11-13 | Stop reason: HOSPADM

## 2023-11-05 RX ORDER — MAGNESIUM SULFATE HEPTAHYDRATE 40 MG/ML
4 INJECTION, SOLUTION INTRAVENOUS
Status: DISCONTINUED | OUTPATIENT
Start: 2023-11-05 | End: 2023-11-13 | Stop reason: HOSPADM

## 2023-11-05 RX ADMIN — IPRATROPIUM BROMIDE AND ALBUTEROL SULFATE 3 ML: 2.5; .5 SOLUTION RESPIRATORY (INHALATION) at 07:11

## 2023-11-05 RX ADMIN — IPRATROPIUM BROMIDE AND ALBUTEROL SULFATE 3 ML: 2.5; .5 SOLUTION RESPIRATORY (INHALATION) at 01:11

## 2023-11-05 RX ADMIN — ASPIRIN 81 MG 81 MG: 81 TABLET ORAL at 08:11

## 2023-11-05 RX ADMIN — IPRATROPIUM BROMIDE AND ALBUTEROL SULFATE 3 ML: 2.5; .5 SOLUTION RESPIRATORY (INHALATION) at 02:11

## 2023-11-05 RX ADMIN — POTASSIUM PHOSPHATE, MONOBASIC POTASSIUM PHOSPHATE, DIBASIC 15 MMOL: 224; 236 INJECTION, SOLUTION, CONCENTRATE INTRAVENOUS at 09:11

## 2023-11-05 RX ADMIN — AMIODARONE HYDROCHLORIDE 150 MG: 1.5 INJECTION, SOLUTION INTRAVENOUS at 07:11

## 2023-11-05 RX ADMIN — METHYLPREDNISOLONE SODIUM SUCCINATE 60 MG: 40 INJECTION, POWDER, FOR SOLUTION INTRAMUSCULAR; INTRAVENOUS at 08:11

## 2023-11-05 RX ADMIN — FUROSEMIDE 40 MG: 10 INJECTION, SOLUTION INTRAVENOUS at 08:11

## 2023-11-05 RX ADMIN — PIPERACILLIN SODIUM AND TAZOBACTAM SODIUM 3.38 G: 3; .375 INJECTION, POWDER, LYOPHILIZED, FOR SOLUTION INTRAVENOUS at 01:11

## 2023-11-05 RX ADMIN — PROPOFOL 40 MCG/KG/MIN: 10 INJECTION, EMULSION INTRAVENOUS at 04:11

## 2023-11-05 RX ADMIN — MUPIROCIN 1 G: 20 OINTMENT TOPICAL at 08:11

## 2023-11-05 RX ADMIN — PANTOPRAZOLE SODIUM 40 MG: 40 INJECTION, POWDER, FOR SOLUTION INTRAVENOUS at 08:11

## 2023-11-05 RX ADMIN — DEXMEDETOMIDINE HYDROCHLORIDE 0.2 MCG/KG/HR: 400 INJECTION INTRAVENOUS at 10:11

## 2023-11-05 RX ADMIN — AMIODARONE HYDROCHLORIDE 1 MG/MIN: 1.8 INJECTION, SOLUTION INTRAVENOUS at 08:11

## 2023-11-05 RX ADMIN — CLOPIDOGREL BISULFATE 75 MG: 75 TABLET, FILM COATED ORAL at 08:11

## 2023-11-05 RX ADMIN — CEFEPIME 1 G: 1 INJECTION, POWDER, FOR SOLUTION INTRAMUSCULAR; INTRAVENOUS at 08:11

## 2023-11-05 RX ADMIN — DEXMEDETOMIDINE HYDROCHLORIDE 0.5 MCG/KG/HR: 400 INJECTION INTRAVENOUS at 06:11

## 2023-11-05 RX ADMIN — DEXMEDETOMIDINE HYDROCHLORIDE 1.4 MCG/KG/HR: 400 INJECTION INTRAVENOUS at 10:11

## 2023-11-05 RX ADMIN — INSULIN ASPART 2 UNITS: 100 INJECTION, SOLUTION INTRAVENOUS; SUBCUTANEOUS at 12:11

## 2023-11-05 RX ADMIN — ALLOPURINOL 50 MG: 300 TABLET ORAL at 08:11

## 2023-11-05 RX ADMIN — ATORVASTATIN CALCIUM 40 MG: 40 TABLET, FILM COATED ORAL at 08:11

## 2023-11-05 RX ADMIN — VANCOMYCIN HYDROCHLORIDE 500 MG: 500 INJECTION, POWDER, LYOPHILIZED, FOR SOLUTION INTRAVENOUS at 07:11

## 2023-11-05 RX ADMIN — INSULIN ASPART 2 UNITS: 100 INJECTION, SOLUTION INTRAVENOUS; SUBCUTANEOUS at 06:11

## 2023-11-05 NOTE — PLAN OF CARE
Problem: Infection  Goal: Absence of Infection Signs and Symptoms  Outcome: Ongoing, Progressing     Problem: Adult Inpatient Plan of Care  Goal: Plan of Care Review  Outcome: Ongoing, Progressing  Goal: Patient-Specific Goal (Individualized)  Outcome: Ongoing, Progressing  Goal: Absence of Hospital-Acquired Illness or Injury  Outcome: Ongoing, Progressing  Goal: Optimal Comfort and Wellbeing  Outcome: Ongoing, Progressing  Goal: Readiness for Transition of Care  Outcome: Ongoing, Progressing     Problem: Diabetes Comorbidity  Goal: Blood Glucose Level Within Targeted Range  Outcome: Ongoing, Progressing     Problem: Fluid Imbalance (Pneumonia)  Goal: Fluid Balance  Outcome: Ongoing, Progressing     Problem: Infection (Pneumonia)  Goal: Resolution of Infection Signs and Symptoms  Outcome: Ongoing, Progressing     Problem: Respiratory Compromise (Pneumonia)  Goal: Effective Oxygenation and Ventilation  Outcome: Ongoing, Progressing     Problem: Impaired Wound Healing  Goal: Optimal Wound Healing  Outcome: Ongoing, Progressing     Problem: Communication Impairment (Mechanical Ventilation, Invasive)  Goal: Effective Communication  Outcome: Ongoing, Progressing     Problem: Device-Related Complication Risk (Mechanical Ventilation, Invasive)  Goal: Optimal Device Function  Outcome: Ongoing, Progressing     Problem: Inability to Wean (Mechanical Ventilation, Invasive)  Goal: Mechanical Ventilation Liberation  Outcome: Ongoing, Progressing     Problem: Nutrition Impairment (Mechanical Ventilation, Invasive)  Goal: Optimal Nutrition Delivery  Outcome: Ongoing, Progressing     Problem: Skin and Tissue Injury (Mechanical Ventilation, Invasive)  Goal: Absence of Device-Related Skin and Tissue Injury  Outcome: Ongoing, Progressing     Problem: Ventilator-Induced Lung Injury (Mechanical Ventilation, Invasive)  Goal: Absence of Ventilator-Induced Lung Injury  Outcome: Ongoing, Progressing     Problem: Communication Impairment  (Artificial Airway)  Goal: Effective Communication  Outcome: Ongoing, Progressing     Problem: Device-Related Complication Risk (Artificial Airway)  Goal: Optimal Device Function  Outcome: Ongoing, Progressing     Problem: Skin and Tissue Injury (Artificial Airway)  Goal: Absence of Device-Related Skin or Tissue Injury  Outcome: Ongoing, Progressing     Problem: Noninvasive Ventilation Acute  Goal: Effective Unassisted Ventilation and Oxygenation  Outcome: Ongoing, Progressing     Problem: Skin Injury Risk Increased  Goal: Skin Health and Integrity  Outcome: Ongoing, Progressing     Problem: Fall Injury Risk  Goal: Absence of Fall and Fall-Related Injury  Outcome: Ongoing, Progressing     Problem: Restraint, Nonbehavioral (Nonviolent)  Goal: Absence of Harm or Injury  Outcome: Ongoing, Progressing

## 2023-11-05 NOTE — HPI
"Marisol Kerr is a 78 y.o. White female   With PMH of HFpEF, COPD,   CAD, DM2, HTN,   who presents with SOB.     Pt currently intubated  Per EMS report, pt was found gasping for air  Said "help me, help me"  then went unresponsive  She was seated, no head trauma  Pt intubated before arrival to ER  No cardiac arrest occured     Pt was DNR, DNI + on hospice  but hospice + DNR/DNI has been revoked by her daughter  "

## 2023-11-05 NOTE — SUBJECTIVE & OBJECTIVE
Interval History:  Patient failed SBT today, remains intubated sedated.  Respiratory culture showed Gram-negative rods.  Potassium elevated.    Review of Systems   Unable to perform ROS: Intubated     Objective:     Vital Signs (Most Recent):  Temp: 98.1 °F (36.7 °C) (11/05/23 0712)  Pulse: (!) 133 (11/05/23 0947)  Resp: (!) 33 (11/05/23 0947)  BP: (!) 182/80 (11/05/23 0947)  SpO2: 95 % (11/05/23 0947) Vital Signs (24h Range):  Temp:  [98.1 °F (36.7 °C)-98.7 °F (37.1 °C)] 98.1 °F (36.7 °C)  Pulse:  [] 133  Resp:  [23-44] 33  SpO2:  [91 %-100 %] 95 %  BP: ()/(29-80) 182/80     Weight: 80 kg (176 lb 5.9 oz)  Body mass index is 31.24 kg/m².    Intake/Output Summary (Last 24 hours) at 11/5/2023 1309  Last data filed at 11/5/2023 0944  Gross per 24 hour   Intake 1373.28 ml   Output 1855 ml   Net -481.72 ml         Physical Exam  Vitals reviewed.   Constitutional:       Appearance: She is obese. She is ill-appearing.   HENT:      Head: Normocephalic and atraumatic.      Mouth/Throat:      Comments: Intubated  Eyes:      Extraocular Movements: Extraocular movements intact.      Pupils: Pupils are equal, round, and reactive to light.   Cardiovascular:      Rate and Rhythm: Regular rhythm. Tachycardia present.   Pulmonary:      Breath sounds: Wheezing present.      Comments: Mechanical breath sounds  Abdominal:      General: Abdomen is flat.      Palpations: Abdomen is soft.   Neurological:      Comments: Sedated             Significant Labs: All pertinent labs within the past 24 hours have been reviewed.  CBC:   Recent Labs   Lab 11/04/23  1057 11/04/23  1059 11/04/23  2230 11/05/23  0505   WBC 23.08*  --  10.21 15.68*   HGB 9.8*  --  7.8* 8.6*   HCT 35.4* 33* 27.4* 29.8*     --  186 180     CMP:   Recent Labs   Lab 11/04/23  1057 11/04/23  1553 11/05/23  0505 11/05/23  0812 11/05/23  1223      < > 144  144 144 142   K 6.2*   < > 5.2*  5.2* 5.6* 5.4*      < > 101  101 102 100   CO2 36*   <  > 35*  35* 30* 34*   *   < > 106  106 80 152*   BUN 38*   < > 43*  43* 43* 43*   CREATININE 1.6*   < > 1.6*  1.6* 1.6* 1.6*   CALCIUM 9.0   < > 8.8  8.8 8.4* 8.4*   PROT 6.6  --  5.5*  --   --    ALBUMIN 3.6  --  2.9*  --   --    BILITOT 0.2  --  0.3  --   --    ALKPHOS 85  --  57  --   --    AST 14  --  15  --   --    ALT 11  --  9*  --   --    ANIONGAP 0*   < > 8  8 12 8    < > = values in this interval not displayed.       Significant Imaging: I have reviewed all pertinent imaging results/findings within the past 24 hours.

## 2023-11-05 NOTE — ASSESSMENT & PLAN NOTE
"Patient's FSGs are controlled on current medication regimen.  Last A1c reviewed-   Lab Results   Component Value Date    HGBA1C 5.0 08/25/2023     Most recent fingerstick glucose reviewed- No results for input(s): "POCTGLUCOSE" in the last 24 hours.  Current correctional scale  Low  Maintain anti-hyperglycemic dose as follows-   Antihyperglycemics (From admission, onward)    Start     Stop Route Frequency Ordered    11/04/23 1439  insulin aspart U-100 pen 0-10 Units         -- SubQ Every 6 hours PRN 11/04/23 1341        Hold Oral hypoglycemics while patient is in the hospital.  "

## 2023-11-05 NOTE — ASSESSMENT & PLAN NOTE
Holding outpatient blood pressure medication given hypotension and pressor requirement   We will resume once blood pressure is more stable

## 2023-11-05 NOTE — PROGRESS NOTES
"Progress Note  PULMONARY    Admit Date: 11/4/2023 11/05/2023  History of Present Illness:  Pt is a 79 yo female with end stage COPD, chronic resp failure on 8L home O2 who is on hospice. She called EMS and was in distress then became unresponsive. She was found to be hypercapneic intubated in ED before her hospice status was known. Her daughter is at bedside and wishes to continue vent support for now hoping she may improve.  Pt has been at home on hospice but daughter states she wants her to go to NH as she hasn't been doing well at home.    SUBJECTIVE:     11/5- continues on vent, with SAT/SBT pt wakes and follows commands but tachypneic, tachycardic and hypertensive so failed SBT. Daughter at bedside actively participating. Pt hard of hearing and has her hearing aid R ear      OBJECTIVE:     Vitals (Most recent):  Vitals:    11/05/23 0730   BP: 131/62   Pulse: 80   Resp: (!) 28   Temp:        Vitals (24 hour range):  Temp:  [96.8 °F (36 °C)-98.7 °F (37.1 °C)]   Pulse:  []   Resp:  [16-44]   BP: ()/(29-83)   SpO2:  [90 %-100 %]       Intake/Output Summary (Last 24 hours) at 11/5/2023 0850  Last data filed at 11/5/2023 0613  Gross per 24 hour   Intake 1373.28 ml   Output 1585 ml   Net -211.72 ml          Physical Exam:  The patient's neuro status (alertness,orientation,cognitive function,motor skills,), pharyngeal exam (oral lesions, hygiene, abn dentition,), Neck (jvd,mass,thyroid,nodes in neck and above/below clavicle),RESPIRATORY(symmetry,effort,fremitus,percussion,auscultation),  Cor(rhythm,heart tones including gallops,perfusion,edema)ABD(distention,hepatic&splenomegaly,tenderness,masses), Skin(rash,cyanosis),Psyc(affect,judgement,).  Exam negative except for these pertinent findings:    Intubated, mouths "help me"  Moderate distress with increased work of breathing on SBT  HR regular, tachycardic  Breath sounds diminished and clear bilaterally  Abd obese, soft  Bilat lower ext pitting " edema  Erythema L anterior manning- daughter states chronic    Radiographs reviewed: view by direct vision   CXR 11/5- bilateral effusions, R>L, RLL airspace disease developing- possible pna  Imaging Results              X-Ray Knee 1 or 2 View Right (Final result)  Result time 11/04/23 11:55:53      Final result by García Russell MD (11/04/23 11:55:53)                   Narrative:    CLINICAL HISTORY:  78 years (1945) Female (Found unresponsive in wheelchair at home; )    TECHNIQUE:  XR KNEE 1-2 VIEWS RIGHT. 2 view(s) obtained .    COMPARISON:  None available.    FINDINGS:  There is diffusely decreased osseous mineralization (suggesting osteoporosis/osteopenia) which limits evaluation for subtle fractures, that being said no acute displaced fracture or dislocation is seen. The joints and interspaces are maintained. There is a trace knee joint effusion in the suprapatellar recess. Faint calcification along the medial and lateral joint line suggesting chondrocalcinosis/CPPD. As the calcifications are seen in the arteries of the popliteal fossa. Faint popcorn-like calcification is seen in the medial metadiaphysis of the distal femur suggesting an enchondroma or bone infarct. Soft tissues are radiographically within normal limits and no radiopaque foreign body is seen.    IMPRESSION:  No acute osseous abnormality.                  .    Electronically signed by:  García Russell MD  11/04/2023 11:55 AM CDT Workstation: [id number]                                     X-Ray Chest AP Portable (Final result)  Result time 11/04/23 11:12:13      Final result by García Russell MD (11/04/23 11:12:13)                   Narrative:    CLINICAL HISTORY:  78 years (1945) Female Found unresponsive in wheelchair at home; Hx of cad, chf, copd, colon cancer    TECHNIQUE:  Portable AP radiograph the chest. One view.    COMPARISON:  Radiograph from August 26, 2023.    FINDINGS:  Mild diffuse interstitial opacity  throughout both lungs with a slight basilar predominance. There is blunting of both costophrenic angles consistent with small right and trace left pleural effusions and adjacent atelectasis. No pneumothorax is identified. The heart is mildly enlarged.  Osseous structures show degenerative changes in the spine. The visualized upper abdomen is unremarkable.    Lines and tubes: Endotracheal tube with tip projecting approximately 6 and meter from the juan j.    IMPRESSION:  Cardiomegaly and findings of mild interstitial pulmonary edema in the mid-lower lung zones.                  .            Electronically signed by:  García Russell MD  11/04/2023 11:12 AM CDT Workstation: [id number]                                  TTE 8/26/23-     Left Ventricle: The left ventricle is normal in size. Moderately increased ventricular mass. Moderately increased wall thickness. There is moderate concentrict hypertrophy. Normal wall motion. There is normal systolic function.  EF 61% There is normal diastolic function.    Left Atrium: Left atrium is moderately dilated.    Right Ventricle: Normal right ventricular cavity size. Wall thickness is normal. Right ventricle wall motion  is normal. Systolic function is normal.    Mitral Valve: Mildly thickened anterior leaflet. Mild mitral annular calcification.    IVC/SVC: Normal venous pressure at 3 mmHg.    Pericardium: There is an effusion.    Limited echo; no gross pulmonary hypertension but poor visualization of tricuspid signal    No tissue Doppler performed to assess mean left atrial pressure    Labs     Recent Labs   Lab 11/04/23  1057 11/04/23  1059 11/05/23  0505   WBC 23.08*   < > 15.68*   HGB 9.8*   < > 8.6*   HCT 35.4*   < > 29.8*      < > 180   BAND 2.0  --   --     < > = values in this interval not displayed.     Recent Labs   Lab 11/04/23  1057 11/04/23  1553 11/05/23  0505      < > 144  144   K 6.2*   < > 5.2*  5.2*      < > 101  101   CO2 36*   < >  35*  35*   BUN 38*   < > 43*  43*   CREATININE 1.6*   < > 1.6*  1.6*   *   < > 106  106   CALCIUM 9.0   < > 8.8  8.8   MG  --   --  2.3   PHOS  --   --  2.5*   AST 14  --  15   ALT 11  --  9*   ALKPHOS 85  --  57   BILITOT 0.2  --  0.3   PROT 6.6  --  5.5*   ALBUMIN 3.6  --  2.9*   PROCAL 0.149  --   --    LACTATE 0.5  --   --    BNP 1,995*  --   --     < > = values in this interval not displayed.     Recent Labs   Lab 11/04/23  2350   PH 7.373   PCO2 63.5*   PO2 73*   HCO3 36.9*     Microbiology Results (last 7 days)       Procedure Component Value Units Date/Time    Urine culture [[id number]] Collected: 11/04/23 1134    Order Status: Completed Specimen: Urine from Clean Catch Updated: 11/05/23 0822     Urine Culture, Routine No growth to date    Blood Culture #2 **CANNOT BE ORDERED STAT** [[id number]] Collected: 11/04/23 1552    Order Status: Completed Specimen: Blood Updated: 11/04/23 2317     Blood Culture, Routine No Growth to date    Narrative:      Collection has been rescheduled by [facility] at 11/04/2023 12:28 Reason:   Unable to collect 2nd set  Collection has been rescheduled by [facility] at 11/04/2023 12:28 Reason:   Unable to collect 2nd set    Blood Culture #1 **CANNOT BE ORDERED STAT** [[id number]] Collected: 11/04/23 1204    Order Status: Completed Specimen: Blood Updated: 11/04/23 1917     Blood Culture, Routine No Growth to date    MRSA Screen by PCR [[id number]]  (Abnormal) Collected: 11/04/23 1609    Order Status: Completed Specimen: Nasopharyngeal Swab from Nasal Updated: 11/04/23 1736     MRSA SCREEN BY PCR Positive     Comment: Positive MRSA by PCR called and verbal readback obtained from [facility], RN. by [facility] 11/04/2023 17:36         Narrative:      Positive MRSA by PCR called and verbal readback obtained from [facility], RN. by [facility] 11/04/2023 17:36    Culture, Respiratory with Gram Stain [[id number]] Collected: 11/04/23 1122    Order Status: Completed Specimen: Respiratory  from Sputum Updated: 11/04/23 1620     Gram Stain (Respiratory) <10 epithelial cells per low power field.     Gram Stain (Respiratory) Few WBC's     Gram Stain (Respiratory) Few Gram negative rods    Urine Culture High Risk [[id number]]     Order Status: Completed Specimen: Urine             Impression:  Active Hospital Problems    Diagnosis  POA    *Acute on chronic heart failure with preserved ejection fraction (HFpEF) [I50.33]  Yes    Acute respiratory failure with hypoxia and hypercarbia [J96.01, J96.02]  Yes    Suspected Pneumonia [J18.9]  Yes    COPD exacerbation [J44.1]  Yes    Hyperkalemia [E87.5]  Yes    Leukocytosis [D72.829]  Yes    Chronic diastolic congestive heart failure [I50.32]  Yes    COPD/emphysema [J44.9]  Yes     Chronic    Chronic kidney disease, stage 4 (severe) [N18.4]  Yes    DM type 2, controlled, with complication [E11.8]  Yes    Essential hypertension, benign [I10]  Yes      Resolved Hospital Problems   No resolved problems to display.           Plan:   Suspect aspiration pna RLL  Acute on chronic hypercapneic/hypoxemic resp failure  Acute on chronic CHF  COPD with acute exacerbation        - continue vent support  - daily SAT/SBT - failed today with tachypnea, tachycardia, htn  - dc versed, propofol and use precedex/fentanyl for sedation  - dc dobutamine and prefer levophed if she needs a pressor  - solumedrol 60mg daily  - continue diuresis  - continue inhaled bronchodilators  - f/u cultures and continue broad spectrum antibiotics  - nutrition consult- start enteral feeds  - receiving treatment dose lovenox- need to get CTA chest r/o PE at some point  - PPI for GI prophylaxis  - daughter at bedside, updated, advised expect difficult time getting pt off ventilator. She expresses she understands prolonged vent support would not be in her best interest.      The following were evaluated and adjusted as needed: mechanical ventilator settings and weaning status, vasopressors, intravenous  fluids and nutritional status, sedation and neurologic status, antibiotics, support tubes and access lines and invasive monitoring, acid base balance and oxygenation needs, input and output and renal status, and CODE STATUS/OUTLOOK DISCUSSED WITH AVAILABLE NEXT OF  KIN       Critical Care  - THE PATIENT HAS A HIGH PROBABILITY OF IMMINENT OR LIFE THREATENING DETERIORATION.  Over 50%time of encounter was in direct care at bedside.  Time was 30 to 74 minutes required for patient care.  Time needed for all of the above totaled 36 minutes.    Ronda Barajas MD  Pulmonary & Critical Care Medicine

## 2023-11-05 NOTE — PROGRESS NOTES
Pharmacist Renal Dose Adjustment Note    Mraisol Kerr is a 78 y.o. female being treated with the medication Cefepime  Patient Data:    Vital Signs (Most Recent):  Temp: 98.1 °F (36.7 °C) (11/05/23 0712)  Pulse: 80 (11/05/23 0730)  Resp: (!) 28 (11/05/23 0730)  BP: 131/62 (11/05/23 0730)  SpO2: 100 % (11/05/23 0730) Vital Signs (72h Range):  Temp:  [96.8 °F (36 °C)-98.7 °F (37.1 °C)]   Pulse:  []   Resp:  [16-44]   BP: ()/(29-83)   SpO2:  [90 %-100 %]      Recent Labs   Lab 11/04/23 2055 11/04/23 2230 11/05/23  0505   CREATININE 1.6* 1.6* 1.6*  1.6*     Serum creatinine: 1.6 mg/dL (H) 11/05/23 0505  Estimated creatinine clearance: 29.9 mL/min (A)    Medication: Cefepime 1g Q12H changed to Cefepime 1g Q24H per Renal Dosing Protocol: CrCl 11-29 mL/min     Pharmacist's Name: Candice Lynne  Pharmacist's Extension: [phone]

## 2023-11-05 NOTE — ASSESSMENT & PLAN NOTE
Patient with Hypercapnic Respiratory failure which is Acute on chronic.  she is on home oxygen at 8 LPM. Supplemental oxygen was provided and noted- Vent Mode: A/C  Oxygen Concentration (%):  [40] 40  Resp Rate Total:  [22 br/min-74 br/min] 28 br/min  Vt Set:  [350 mL] 350 mL  PEEP/CPAP:  [5 cmH20] 5 cmH20  Pressure Support:  [10 cmH20] 10 cmH20  Mean Airway Pressure:  [11 uoC27-88 cmH20] 12 cmH20    .   Signs/symptoms of respiratory failure include- increased work of breathing and respiratory distress. Contributing diagnoses includes - Aspiration and COPD Labs and images were reviewed. Patient Has recent ABG, which has been reviewed. Will treat underlying causes and adjust management of respiratory failure as follows- steroids, continue IV antibiotics, pulmonology consulted.  Daily weaning of mechanical ventilation.  Continue with inhalers.

## 2023-11-05 NOTE — ASSESSMENT & PLAN NOTE
Likely secondary to aspiration pneumonia, patient also on steroids which can elevate white blood cells.

## 2023-11-05 NOTE — NURSING
Sedation paused at 0915 for breathing trial.  Patient was awake and following commands. Patient was placed on pressure support 10/5 at 926.   After approximately 15 minutes patient became tachycardic, tachypneic, using accessory muscles, agitated, and blood pressure was elevated.  Dr Barajas was notified and came to bedside.  Dr Barajas spoke to patient's daughter who was at bedside, and patient was placed back on assist control at 0947. Sedation restarted per Dr Barajas.

## 2023-11-05 NOTE — HOSPITAL COURSE
Patient admitted for acute hypercapnic respiratory failure secondary to COPD exacerbation.  Chest x-ray showed possible aspiration.  Patient was intubated before arrival to the emergency room.  Admitted to the ICU.  Pulmonology consulted.  Patient started on pressor support, given IV diuretics for concern of fluid overload.  Started on empiric IV antibiotics.  Steroids and inhalers.  Weaned off pressors.  Difficult weaning off of mechanical ventilation.  Sputum cultures were positive for Klebsiella pneumoniae.  Patient developed atrial fibrillation with rapid RVR, amiodarone drip was started.  Drip was discontinued patient was started on p.o. amiodarone.  Patient was successfully extubated on 11/07/2023.  Was placed on BiPAP at night.  Oxygenation improved.  IV steroids converted to p.o..  IV Lasix down titrated.  Pt underwent sacral ulcer debridement- according to cultures- Pt should be on Vancomycin for 2 weeks with vanc random level daily to keep the goal at 15-20 with follow up with wound care team.

## 2023-11-05 NOTE — PROGRESS NOTES
"Critical access hospital Medicine  Progress Note    Patient Name: Marisol Kerr  MRN: [id number]  Patient Class: IP- Inpatient   Admission Date: 11/4/2023  Length of Stay: 1 days  Attending Physician: Radha Maravilla MD  Primary Care Provider: Khadar Dwyer MD        Subjective:     Principal Problem:Acute respiratory failure with hypoxia and hypercarbia        HPI:  Marisol Kerr is a 78 y.o. White female   With PMH of HFpEF, COPD,   CAD, DM2, HTN,   who presents with SOB.     Pt currently intubated  Per EMS report, pt was found gasping for air  Said "help me, help me"  then went unresponsive  She was seated, no head trauma  Pt intubated before arrival to ER  No cardiac arrest occured     Pt was DNR, DNI + on hospice  but hospice + DNR/DNI has been revoked by her daughter      Overview/Hospital Course:  Patient admitted for acute hypercapnic respiratory failure secondary to COPD exacerbation.  Chest x-ray showed possible aspiration.  Patient was intubated before arrival to the emergency room.  Admitted to the ICU.  Pulmonology consulted.  Patient started on pressor support, given IV diuretics for concern of fluid overload.  Started on empiric IV antibiotics.  Steroids and inhalers.  Weaned off pressors.      Interval History:  Patient failed SBT today, remains intubated sedated.  Respiratory culture showed Gram-negative rods.  Potassium elevated.    Review of Systems   Unable to perform ROS: Intubated     Objective:     Vital Signs (Most Recent):  Temp: 98.1 °F (36.7 °C) (11/05/23 0712)  Pulse: (!) 133 (11/05/23 0947)  Resp: (!) 33 (11/05/23 0947)  BP: (!) 182/80 (11/05/23 0947)  SpO2: 95 % (11/05/23 0947) Vital Signs (24h Range):  Temp:  [98.1 °F (36.7 °C)-98.7 °F (37.1 °C)] 98.1 °F (36.7 °C)  Pulse:  [] 133  Resp:  [23-44] 33  SpO2:  [91 %-100 %] 95 %  BP: ()/(29-80) 182/80     Weight: 80 kg (176 lb 5.9 oz)  Body mass index is 31.24 kg/m².    Intake/Output Summary (Last 24 hours) at 11/5/2023 " 1309  Last data filed at 11/5/2023 0944  Gross per 24 hour   Intake 1373.28 ml   Output 1855 ml   Net -481.72 ml         Physical Exam  Vitals reviewed.   Constitutional:       Appearance: She is obese. She is ill-appearing.   HENT:      Head: Normocephalic and atraumatic.      Mouth/Throat:      Comments: Intubated  Eyes:      Extraocular Movements: Extraocular movements intact.      Pupils: Pupils are equal, round, and reactive to light.   Cardiovascular:      Rate and Rhythm: Regular rhythm. Tachycardia present.   Pulmonary:      Breath sounds: Wheezing present.      Comments: Mechanical breath sounds  Abdominal:      General: Abdomen is flat.      Palpations: Abdomen is soft.   Neurological:      Comments: Sedated             Significant Labs: All pertinent labs within the past 24 hours have been reviewed.  CBC:   Recent Labs   Lab 11/04/23  1057 11/04/23  1059 11/04/23  2230 11/05/23  0505   WBC 23.08*  --  10.21 15.68*   HGB 9.8*  --  7.8* 8.6*   HCT 35.4* 33* 27.4* 29.8*     --  186 180     CMP:   Recent Labs   Lab 11/04/23  1057 11/04/23  1553 11/05/23  0505 11/05/23  0812 11/05/23  1223      < > 144  144 144 142   K 6.2*   < > 5.2*  5.2* 5.6* 5.4*      < > 101  101 102 100   CO2 36*   < > 35*  35* 30* 34*   *   < > 106  106 80 152*   BUN 38*   < > 43*  43* 43* 43*   CREATININE 1.6*   < > 1.6*  1.6* 1.6* 1.6*   CALCIUM 9.0   < > 8.8  8.8 8.4* 8.4*   PROT 6.6  --  5.5*  --   --    ALBUMIN 3.6  --  2.9*  --   --    BILITOT 0.2  --  0.3  --   --    ALKPHOS 85  --  57  --   --    AST 14  --  15  --   --    ALT 11  --  9*  --   --    ANIONGAP 0*   < > 8  8 12 8    < > = values in this interval not displayed.       Significant Imaging: I have reviewed all pertinent imaging results/findings within the past 24 hours.      Assessment/Plan:      * Acute respiratory failure with hypoxia and hypercarbia  Patient with Hypercapnic Respiratory failure which is Acute on chronic.  she is on  home oxygen at 8 LPM. Supplemental oxygen was provided and noted- Vent Mode: A/C  Oxygen Concentration (%):  [40] 40  Resp Rate Total:  [22 br/min-74 br/min] 28 br/min  Vt Set:  [350 mL] 350 mL  PEEP/CPAP:  [5 cmH20] 5 cmH20  Pressure Support:  [10 cmH20] 10 cmH20  Mean Airway Pressure:  [11 krA53-96 cmH20] 12 cmH20    .   Signs/symptoms of respiratory failure include- increased work of breathing and respiratory distress. Contributing diagnoses includes - Aspiration and COPD Labs and images were reviewed. Patient Has recent ABG, which has been reviewed. Will treat underlying causes and adjust management of respiratory failure as follows- steroids, continue IV antibiotics, pulmonology consulted.  Daily weaning of mechanical ventilation.  Continue with inhalers.    Suspected Pneumonia  Checks x-ray showing possible aspiration pneumonia, continue with empiric IV antibiotics.      COPD exacerbation  Patient's COPD is with exacerbation noted by continued dyspnea, use of accessory muscles for breathing and worsening of baseline hypoxia currently.  Patient is currently on COPD Pathway. Continue scheduled inhalers Steroids, Antibiotics and Supplemental oxygen and monitor respiratory status closely.     Patient with severe end-stage COPD, currently on home hospice.  On 8 L oxygen at home.  Discussions held with daughter about overall prognosis and difficulty weaning ventilation, daughter verbalized understanding.  We will continue with SBT trials daily.    Acute on chronic heart failure with preserved ejection fraction (HFpEF)  Patient is identified as having Diastolic (HFpEF) heart failure that is Acute on chronic. CHF is currently controlled. Latest ECHO performed and demonstrates- Results for orders placed during the hospital encounter of 08/25/23    Echo Saline Bubble? No    Interpretation Summary    Left Ventricle: The left ventricle is normal in size. Moderately increased ventricular mass. Moderately increased wall  "thickness. There is moderate concentrict hypertrophy. Normal wall motion. There is normal systolic function.  EF 61% There is normal diastolic function.    Left Atrium: Left atrium is moderately dilated.    Right Ventricle: Normal right ventricular cavity size. Wall thickness is normal. Right ventricle wall motion  is normal. Systolic function is normal.    Mitral Valve: Mildly thickened anterior leaflet. Mild mitral annular calcification.    IVC/SVC: Normal venous pressure at 3 mmHg.    Pericardium: There is an effusion.    Limited echo; no gross pulmonary hypertension but poor visualization of tricuspid signal    No tissue Doppler performed to assess mean left atrial pressure    We will continue with IV diuretics.  Chest x-ray shows improvement.  Discontinue dobutamine.  If pressors needed we will start Levophed.  Strict I's and O's.    Recent Labs   Lab 11/04/23  1057   BNP 1,995*   .    Hyperkalemia  Continue to monitor, patient currently receiving inhalers and Lasix  The patients latest potassium has been reviewed and the results are listed below  Recent Labs   Lab 11/05/23  1223   K 5.4*           Leukocytosis  Likely secondary to aspiration pneumonia, patient also on steroids which can elevate white blood cells.      Chronic kidney disease, stage 4 (severe)  Creatine stable for now. BMP reviewed- noted Estimated Creatinine Clearance: 29.9 mL/min (A) (based on SCr of 1.6 mg/dL (H)). according to latest data. Based on current GFR, CKD stage is stage 3 - GFR 30-59.  Monitor UOP and serial BMP and adjust therapy as needed. Renally dose meds. Avoid nephrotoxic medications and procedures.    DM type 2, controlled, with complication  Patient's FSGs are controlled on current medication regimen.  Last A1c reviewed-   Lab Results   Component Value Date    HGBA1C 5.0 08/25/2023     Most recent fingerstick glucose reviewed- No results for input(s): "POCTGLUCOSE" in the last 24 hours.  Current correctional scale  " Low  Maintain anti-hyperglycemic dose as follows-   Antihyperglycemics (From admission, onward)    Start     Stop Route Frequency Ordered    11/04/23 1439  insulin aspart U-100 pen 0-10 Units         -- SubQ Every 6 hours PRN 11/04/23 1341        Hold Oral hypoglycemics while patient is in the hospital.    Essential hypertension, benign  Holding outpatient blood pressure medication given hypotension and pressor requirement   We will resume once blood pressure is more stable      VTE Risk Mitigation (From admission, onward)         Ordered     enoxaparin injection 70 mg  Every 24 hours         11/04/23 1641     IP VTE HIGH RISK PATIENT  Once         11/04/23 1341     Place sequential compression device  Until discontinued         11/04/23 1341                Discharge Planning   LUZ:      Code Status: Full Code   Is the patient medically ready for discharge?:     Reason for patient still in hospital (select all that apply): Patient unstable               Critical care time spent on the evaluation and treatment of severe organ dysfunction, review of pertinent labs and imaging studies, discussions with consulting providers and discussions with patient/family: 33 minutes.      Radha Maravilla MD  Department of Hospital Medicine   [facility]

## 2023-11-05 NOTE — ASSESSMENT & PLAN NOTE
Continue to monitor, patient currently receiving inhalers and Lasix  The patients latest potassium has been reviewed and the results are listed below  Recent Labs   Lab 11/05/23  1223   K 5.4*

## 2023-11-05 NOTE — CARE UPDATE
11/05/23 0717   Patient Assessment/Suction   Level of Consciousness (AVPU) unresponsive   Respiratory Effort Normal   Expansion/Accessory Muscles/Retractions no use of accessory muscles   All Lung Fields Breath Sounds clear   Rhythm/Pattern, Respiratory unlabored   PRE-TX-O2   Device (Oxygen Therapy) ventilator   $ Is the patient on Low Flow Oxygen? Yes   SpO2 100 %   Pulse Oximetry Type Continuous   $ Pulse Oximetry - Single Charge Pulse Oximetry - Single   Pulse 78   Resp (!) 28   Aerosol Therapy   $ Aerosol Therapy Charges Aerosol Treatment   Daily Review of Necessity (SVN) completed   Respiratory Treatment Status (SVN) given   Treatment Route (SVN) in-line   Patient Position (SVN) HOB elevated   Post Treatment Assessment (SVN) breath sounds unchanged   Signs of Intolerance (SVN) none   Breath Sounds Post-Respiratory Treatment   Throughout All Fields Post-Treatment All Fields   Throughout All Fields Post-Treatment no change   Post-treatment Heart Rate (beats/min) 75   Post-treatment Resp Rate (breaths/min) 28        Airway - Non-Surgical 11/04/23 1100 Endotracheal Tube   Placement Date/Time: 11/04/23 1100   Present Prior to Hospital Arrival?: Yes  Inserted by: EMS  Airway Device: Endotracheal Tube  Intubated: (c) Other (see comments)  Airway Device Size: 7.5  Style: Cuffed  Cuff Inflation: Minimal occlusive pressure  ...   Secured at 22 cm   Measured At Lips   Secured Location Left   Secured by Commercial tube collins   Bite Block left   Site Condition Cool;Dry   Status Intact;Secured;Patent   Site Assessment Clean;Dry   Cuff Volume   (MLT)   Airway Safety   Is Ambu Bag and Mask with Patient? Yes, Adult Ambu Bag and Mask   Suction set is at the bedside? Yes   Vent Select   Conventional Vent Y   Ventilator Initiated No   $ Ventilator Subsequent 1   Charged w/in last 24h YES   Preset Conventional Ventilator Settings   Vent ID 5   Vent Type    Humidity HME   Patient Ventilator Parameters   Tubing ID (mm) 7.5  mm   Tube Type ET   Conventional Ventilator Alarms   Alarms On Y   Ve High Alarm 15 L/min   Ve Low Alarm 2 L/min   Vt High Alarm 1200 mL   Vt Low Alarm 200 mL   Education   $ Education Bronchodilator;Ventilator Oxygen;30 min

## 2023-11-05 NOTE — CARE UPDATE
11/04/23 1946   Patient Assessment/Suction   Level of Consciousness (AVPU) unresponsive   Respiratory Effort Normal   Expansion/Accessory Muscles/Retractions expansion symmetric;no retractions;no use of accessory muscles   All Lung Fields Breath Sounds coarse;diminished   Rhythm/Pattern, Respiratory assisted mechanically   Cough Frequency with stimulation   Cough Type assisted   Suction Method oral;tracheal   $ Suction Charges Inline Suction Procedure Stat Charge;Close Suction System Equipment   Secretions Amount small   Secretions Color pale;creamy   Secretions Characteristics thick   Skin Integrity   $ Wound Care Tech Time 15 min   Area Observed Left;Right;Cheek   Skin Appearance without discoloration   PRE-TX-O2   Device (Oxygen Therapy) ventilator   $ Is the patient on Low Flow Oxygen? Yes   Oxygen Concentration (%) 40   SpO2 100 %   Pulse Oximetry Type Continuous   $ Pulse Oximetry - Multiple Charge Pulse Oximetry - Multiple   Pulse 89   Resp (!) 28   BP (!) 104/48   Aerosol Therapy   $ Aerosol Therapy Charges Aerosol Treatment   Daily Review of Necessity (SVN) completed   Respiratory Treatment Status (SVN) given   Treatment Route (SVN) in-line   Patient Position (SVN) semi-Solorzano's   Post Treatment Assessment (SVN) increased aeration   Signs of Intolerance (SVN) none        Airway - Non-Surgical 11/04/23 1100 Endotracheal Tube   Placement Date/Time: 11/04/23 1100   Present Prior to Hospital Arrival?: Yes  Inserted by: EMS  Airway Device: Endotracheal Tube  Intubated: (c) Other (see comments)  Airway Device Size: 7.5  Style: Cuffed  Cuff Inflation: Minimal occlusive pressure  ...   Secured at 22 cm   Measured At Lips   Secured Location Center   Secured by Commercial tube collins   Position of ETT in xRay In good position   Site Condition Cool;Dry   Status Secured;Patent;Intact   Site Assessment Clean;Dry   Vent Select   Conventional Vent Y   $ Ventilator Subsequent 1   Charged w/in last 24h YES   Preset  Conventional Ventilator Settings   Vent ID 5   Vent Type    Ventilation Type VC   Vent Mode A/C   Humidity HME   Set Rate 28 BPM   Vt Set 350 mL   PEEP/CPAP 5 cmH20   Peak Flow 60 L/min   Peak End Inspiratory Pressure 18 cmH20   I-Trigger Type  V-TRIG   Trigger Sensitivity Flow/I-Trigger 3 L/min   Patient Ventilator Parameters   Resp Rate Total 28 br/min   Peak Airway Pressure 25 cmH20   Mean Airway Pressure 11 cmH20   Plateau Pressure 0 cmH20   Exhaled Vt 360 mL   Total Ve 10.1 L/m   I:E Ratio Measured 1:2.40   Auto PEEP 0 cmH20   Tubing ID (mm) 7.5 mm   Tube Type ET   Conventional Ventilator Alarms   Alarms On Y   Resp Rate High Alarm 50 br/min   Press High Alarm 40 cmH2O   Apnea Rate 10   Apnea Volume (mL) 0 mL   Apnea Oxygen Concentration  100   Apnea Flow Rate (L/min) 44   T Apnea 20 sec(s)   Ready to Wean/Extubation Screen   FIO2<=50 (chart decimal) 0.4   MV<16L (chart vol.) 10.1   PEEP <=8 (chart #) 5   Ready to Wean Parameters   F/VT Ratio<105 (RSBI) (!) 77.78   Vital Capacity   Vital Capacity (mL) 0   Education   $ Education Bronchodilator;Ventilator Oxygen;30 min   Respiratory Evaluation   $ Care Plan Tech Time 30 min   $ Eval/Re-eval Charges Re-evaluation

## 2023-11-05 NOTE — ASSESSMENT & PLAN NOTE
Creatine stable for now. BMP reviewed- noted Estimated Creatinine Clearance: 29.9 mL/min (A) (based on SCr of 1.6 mg/dL (H)). according to latest data. Based on current GFR, CKD stage is stage 3 - GFR 30-59.  Monitor UOP and serial BMP and adjust therapy as needed. Renally dose meds. Avoid nephrotoxic medications and procedures.

## 2023-11-05 NOTE — ASSESSMENT & PLAN NOTE
Patient is identified as having Diastolic (HFpEF) heart failure that is Acute on chronic. CHF is currently controlled. Latest ECHO performed and demonstrates- Results for orders placed during the hospital encounter of 08/25/23    Echo Saline Bubble? No    Interpretation Summary    Left Ventricle: The left ventricle is normal in size. Moderately increased ventricular mass. Moderately increased wall thickness. There is moderate concentrict hypertrophy. Normal wall motion. There is normal systolic function.  EF 61% There is normal diastolic function.    Left Atrium: Left atrium is moderately dilated.    Right Ventricle: Normal right ventricular cavity size. Wall thickness is normal. Right ventricle wall motion  is normal. Systolic function is normal.    Mitral Valve: Mildly thickened anterior leaflet. Mild mitral annular calcification.    IVC/SVC: Normal venous pressure at 3 mmHg.    Pericardium: There is an effusion.    Limited echo; no gross pulmonary hypertension but poor visualization of tricuspid signal    No tissue Doppler performed to assess mean left atrial pressure    We will continue with IV diuretics.  Chest x-ray shows improvement.  Discontinue dobutamine.  If pressors needed we will start Levophed.  Strict I's and O's.    Recent Labs   Lab 11/04/23  1057   BNP 1,995*   .

## 2023-11-05 NOTE — ASSESSMENT & PLAN NOTE
Patient's COPD is with exacerbation noted by continued dyspnea, use of accessory muscles for breathing and worsening of baseline hypoxia currently.  Patient is currently on COPD Pathway. Continue scheduled inhalers Steroids, Antibiotics and Supplemental oxygen and monitor respiratory status closely.     Patient with severe end-stage COPD, currently on home hospice.  On 8 L oxygen at home.  Discussions held with daughter about overall prognosis and difficulty weaning ventilation, daughter verbalized understanding.  We will continue with SBT trials daily.

## 2023-11-06 ENCOUNTER — ANESTHESIA EVENT (OUTPATIENT)
Dept: INTENSIVE CARE | Facility: HOSPITAL | Age: 78
DRG: 166 | End: 2023-11-06
Payer: MEDICARE

## 2023-11-06 ENCOUNTER — ANESTHESIA (OUTPATIENT)
Dept: INTENSIVE CARE | Facility: HOSPITAL | Age: 78
DRG: 166 | End: 2023-11-06
Payer: MEDICARE

## 2023-11-06 PROBLEM — I48.91 ATRIAL FIBRILLATION: Status: ACTIVE | Noted: 2023-11-06

## 2023-11-06 LAB
ALBUMIN SERPL BCP-MCNC: 3 G/DL (ref 3.5–5.2)
ALLENS TEST: ABNORMAL
ALLENS TEST: ABNORMAL
ALP SERPL-CCNC: 59 U/L (ref 55–135)
ALT SERPL W/O P-5'-P-CCNC: 8 U/L (ref 10–44)
ANION GAP SERPL CALC-SCNC: 5 MMOL/L (ref 8–16)
ANION GAP SERPL CALC-SCNC: 7 MMOL/L (ref 8–16)
ANION GAP SERPL CALC-SCNC: 7 MMOL/L (ref 8–16)
AST SERPL-CCNC: 15 U/L (ref 10–40)
BACTERIA SPEC AEROBE CULT: ABNORMAL
BACTERIA UR CULT: NO GROWTH
BASOPHILS # BLD AUTO: 0.03 K/UL (ref 0–0.2)
BASOPHILS NFR BLD: 0.3 % (ref 0–1.9)
BILIRUB SERPL-MCNC: 0.3 MG/DL (ref 0.1–1)
BUN SERPL-MCNC: 45 MG/DL (ref 8–23)
BUN SERPL-MCNC: 45 MG/DL (ref 8–23)
BUN SERPL-MCNC: 46 MG/DL (ref 8–23)
CALCIUM SERPL-MCNC: 8.4 MG/DL (ref 8.7–10.5)
CALCIUM SERPL-MCNC: 8.4 MG/DL (ref 8.7–10.5)
CALCIUM SERPL-MCNC: 8.6 MG/DL (ref 8.7–10.5)
CHLORIDE SERPL-SCNC: 97 MMOL/L (ref 95–110)
CO2 SERPL-SCNC: 38 MMOL/L (ref 23–29)
CREAT SERPL-MCNC: 1.8 MG/DL (ref 0.5–1.4)
CREAT SERPL-MCNC: 1.9 MG/DL (ref 0.5–1.4)
CREAT SERPL-MCNC: 1.9 MG/DL (ref 0.5–1.4)
DELSYS: ABNORMAL
DELSYS: ABNORMAL
DIFFERENTIAL METHOD: ABNORMAL
EOSINOPHIL # BLD AUTO: 0.1 K/UL (ref 0–0.5)
EOSINOPHIL NFR BLD: 0.6 % (ref 0–8)
ERYTHROCYTE [DISTWIDTH] IN BLOOD BY AUTOMATED COUNT: 15.2 % (ref 11.5–14.5)
ERYTHROCYTE [SEDIMENTATION RATE] IN BLOOD BY WESTERGREN METHOD: 28 MM/H
ERYTHROCYTE [SEDIMENTATION RATE] IN BLOOD BY WESTERGREN METHOD: 28 MM/H
EST. GFR  (NO RACE VARIABLE): 27 ML/MIN/1.73 M^2
EST. GFR  (NO RACE VARIABLE): 27 ML/MIN/1.73 M^2
EST. GFR  (NO RACE VARIABLE): 28.8 ML/MIN/1.73 M^2
FIO2: 35
FIO2: 40
GLUCOSE SERPL-MCNC: 153 MG/DL (ref 70–110)
GLUCOSE SERPL-MCNC: 155 MG/DL (ref 70–110)
GLUCOSE SERPL-MCNC: 155 MG/DL (ref 70–110)
GLUCOSE SERPL-MCNC: 181 MG/DL (ref 70–110)
GLUCOSE SERPL-MCNC: 186 MG/DL (ref 70–110)
GRAM STN SPEC: ABNORMAL
HCO3 UR-SCNC: 37.8 MMOL/L (ref 24–28)
HCO3 UR-SCNC: 40.2 MMOL/L (ref 24–28)
HCT VFR BLD AUTO: 29.8 % (ref 37–48.5)
HCT VFR BLD CALC: 26 %PCV (ref 36–54)
HCT VFR BLD CALC: 27 %PCV (ref 36–54)
HGB BLD-MCNC: 8.8 G/DL (ref 12–16)
IMM GRANULOCYTES # BLD AUTO: 0.07 K/UL (ref 0–0.04)
IMM GRANULOCYTES NFR BLD AUTO: 0.6 % (ref 0–0.5)
LYMPHOCYTES # BLD AUTO: 1.1 K/UL (ref 1–4.8)
LYMPHOCYTES NFR BLD: 9.6 % (ref 18–48)
MAGNESIUM SERPL-MCNC: 2.2 MG/DL (ref 1.6–2.6)
MCH RBC QN AUTO: 26.7 PG (ref 27–31)
MCHC RBC AUTO-ENTMCNC: 29.5 G/DL (ref 32–36)
MCV RBC AUTO: 90 FL (ref 82–98)
MIN VOL: 10
MIN VOL: 35
MODE: ABNORMAL
MODE: ABNORMAL
MONOCYTES # BLD AUTO: 0.6 K/UL (ref 0.3–1)
MONOCYTES NFR BLD: 5.1 % (ref 4–15)
NEUTROPHILS # BLD AUTO: 9.7 K/UL (ref 1.8–7.7)
NEUTROPHILS NFR BLD: 83.8 % (ref 38–73)
NRBC BLD-RTO: 0 /100 WBC
PCO2 BLDA: 46.7 MMHG (ref 35–45)
PCO2 BLDA: 53.9 MMHG (ref 35–45)
PEEP: 5
PEEP: 5
PH SMN: 7.48 [PH] (ref 7.35–7.45)
PH SMN: 7.52 [PH] (ref 7.35–7.45)
PHOSPHATE SERPL-MCNC: 4.9 MG/DL (ref 2.7–4.5)
PIP: 28
PLATELET # BLD AUTO: 185 K/UL (ref 150–450)
PMV BLD AUTO: 11.9 FL (ref 9.2–12.9)
PO2 BLDA: 33 MMHG (ref 80–100)
PO2 BLDA: 73 MMHG (ref 80–100)
POC BE: 15 MMOL/L
POC BE: 17 MMOL/L
POC IONIZED CALCIUM: 1.11 MMOL/L (ref 1.06–1.42)
POC IONIZED CALCIUM: 1.11 MMOL/L (ref 1.06–1.42)
POC SATURATED O2: 66 % (ref 95–100)
POC SATURATED O2: 96 % (ref 95–100)
POC TCO2: 39 MMOL/L (ref 23–27)
POC TCO2: 42 MMOL/L (ref 23–27)
POTASSIUM BLD-SCNC: 4.1 MMOL/L (ref 3.5–5.1)
POTASSIUM BLD-SCNC: 4.3 MMOL/L (ref 3.5–5.1)
POTASSIUM SERPL-SCNC: 4.7 MMOL/L (ref 3.5–5.1)
POTASSIUM SERPL-SCNC: 4.7 MMOL/L (ref 3.5–5.1)
POTASSIUM SERPL-SCNC: 4.8 MMOL/L (ref 3.5–5.1)
PROT SERPL-MCNC: 5.7 G/DL (ref 6–8.4)
RBC # BLD AUTO: 3.3 M/UL (ref 4–5.4)
SAMPLE: ABNORMAL
SAMPLE: ABNORMAL
SITE: ABNORMAL
SITE: ABNORMAL
SODIUM BLD-SCNC: 136 MMOL/L (ref 136–145)
SODIUM BLD-SCNC: 139 MMOL/L (ref 136–145)
SODIUM SERPL-SCNC: 140 MMOL/L (ref 136–145)
SODIUM SERPL-SCNC: 142 MMOL/L (ref 136–145)
SODIUM SERPL-SCNC: 142 MMOL/L (ref 136–145)
SP02: 100
TROPONIN I SERPL HS-MCNC: 801.4 PG/ML (ref 0–14.9)
TROPONIN I SERPL HS-MCNC: 935.7 PG/ML (ref 0–14.9)
VT: 350
VT: 350
WBC # BLD AUTO: 11.56 K/UL (ref 3.9–12.7)

## 2023-11-06 PROCEDURE — 99900031 HC PATIENT EDUCATION (STAT)

## 2023-11-06 PROCEDURE — 82330 ASSAY OF CALCIUM: CPT

## 2023-11-06 PROCEDURE — 82803 BLOOD GASES ANY COMBINATION: CPT

## 2023-11-06 PROCEDURE — 94761 N-INVAS EAR/PLS OXIMETRY MLT: CPT

## 2023-11-06 PROCEDURE — 63600175 PHARM REV CODE 636 W HCPCS: Performed by: INTERNAL MEDICINE

## 2023-11-06 PROCEDURE — 99291 PR CRITICAL CARE, E/M 30-74 MINUTES: ICD-10-PCS | Mod: ,,, | Performed by: INTERNAL MEDICINE

## 2023-11-06 PROCEDURE — 63600175 PHARM REV CODE 636 W HCPCS

## 2023-11-06 PROCEDURE — C9113 INJ PANTOPRAZOLE SODIUM, VIA: HCPCS | Performed by: FAMILY MEDICINE

## 2023-11-06 PROCEDURE — 36620 INSERTION CATHETER ARTERY: CPT | Mod: ,,, | Performed by: ANESTHESIOLOGY

## 2023-11-06 PROCEDURE — 27000221 HC OXYGEN, UP TO 24 HOURS

## 2023-11-06 PROCEDURE — 83735 ASSAY OF MAGNESIUM: CPT | Performed by: FAMILY MEDICINE

## 2023-11-06 PROCEDURE — 63600175 PHARM REV CODE 636 W HCPCS: Performed by: FAMILY MEDICINE

## 2023-11-06 PROCEDURE — 80053 COMPREHEN METABOLIC PANEL: CPT | Performed by: FAMILY MEDICINE

## 2023-11-06 PROCEDURE — 94799 UNLISTED PULMONARY SVC/PX: CPT

## 2023-11-06 PROCEDURE — 99900026 HC AIRWAY MAINTENANCE (STAT)

## 2023-11-06 PROCEDURE — 84132 ASSAY OF SERUM POTASSIUM: CPT

## 2023-11-06 PROCEDURE — 25000003 PHARM REV CODE 250: Performed by: STUDENT IN AN ORGANIZED HEALTH CARE EDUCATION/TRAINING PROGRAM

## 2023-11-06 PROCEDURE — 84484 ASSAY OF TROPONIN QUANT: CPT | Mod: 91 | Performed by: STUDENT IN AN ORGANIZED HEALTH CARE EDUCATION/TRAINING PROGRAM

## 2023-11-06 PROCEDURE — 25000003 PHARM REV CODE 250: Performed by: INTERNAL MEDICINE

## 2023-11-06 PROCEDURE — 36600 WITHDRAWAL OF ARTERIAL BLOOD: CPT

## 2023-11-06 PROCEDURE — 99291 CRITICAL CARE FIRST HOUR: CPT | Mod: ,,, | Performed by: INTERNAL MEDICINE

## 2023-11-06 PROCEDURE — 63600175 PHARM REV CODE 636 W HCPCS: Performed by: STUDENT IN AN ORGANIZED HEALTH CARE EDUCATION/TRAINING PROGRAM

## 2023-11-06 PROCEDURE — C1751 CATH, INF, PER/CENT/MIDLINE: HCPCS

## 2023-11-06 PROCEDURE — 99233 PR SUBSEQUENT HOSPITAL CARE,LEVL III: ICD-10-PCS | Mod: ,,, | Performed by: GENERAL PRACTICE

## 2023-11-06 PROCEDURE — 85014 HEMATOCRIT: CPT

## 2023-11-06 PROCEDURE — 25000242 PHARM REV CODE 250 ALT 637 W/ HCPCS: Performed by: INTERNAL MEDICINE

## 2023-11-06 PROCEDURE — 25000242 PHARM REV CODE 250 ALT 637 W/ HCPCS: Performed by: FAMILY MEDICINE

## 2023-11-06 PROCEDURE — 99233 SBSQ HOSP IP/OBS HIGH 50: CPT | Mod: ,,, | Performed by: GENERAL PRACTICE

## 2023-11-06 PROCEDURE — 25000003 PHARM REV CODE 250: Performed by: FAMILY MEDICINE

## 2023-11-06 PROCEDURE — 94640 AIRWAY INHALATION TREATMENT: CPT

## 2023-11-06 PROCEDURE — 99900035 HC TECH TIME PER 15 MIN (STAT)

## 2023-11-06 PROCEDURE — 80048 BASIC METABOLIC PNL TOTAL CA: CPT | Performed by: FAMILY MEDICINE

## 2023-11-06 PROCEDURE — 25000003 PHARM REV CODE 250: Performed by: NURSE PRACTITIONER

## 2023-11-06 PROCEDURE — 31500 INSERT EMERGENCY AIRWAY: CPT | Mod: ,,, | Performed by: INTERNAL MEDICINE

## 2023-11-06 PROCEDURE — 94003 VENT MGMT INPAT SUBQ DAY: CPT

## 2023-11-06 PROCEDURE — 84100 ASSAY OF PHOSPHORUS: CPT | Performed by: FAMILY MEDICINE

## 2023-11-06 PROCEDURE — 31500 PR INSERT, EMERGENCY ENDOTRACH AIRWAY: ICD-10-PCS | Mod: ,,, | Performed by: INTERNAL MEDICINE

## 2023-11-06 PROCEDURE — 36620 ARTERIAL: ICD-10-PCS | Mod: ,,, | Performed by: ANESTHESIOLOGY

## 2023-11-06 PROCEDURE — 85025 COMPLETE CBC W/AUTO DIFF WBC: CPT | Performed by: FAMILY MEDICINE

## 2023-11-06 PROCEDURE — 84295 ASSAY OF SERUM SODIUM: CPT

## 2023-11-06 PROCEDURE — 84484 ASSAY OF TROPONIN QUANT: CPT | Performed by: STUDENT IN AN ORGANIZED HEALTH CARE EDUCATION/TRAINING PROGRAM

## 2023-11-06 PROCEDURE — 20000000 HC ICU ROOM

## 2023-11-06 RX ORDER — FUROSEMIDE 10 MG/ML
40 INJECTION INTRAMUSCULAR; INTRAVENOUS DAILY
Status: DISCONTINUED | OUTPATIENT
Start: 2023-11-06 | End: 2023-11-08

## 2023-11-06 RX ORDER — PROPOFOL 10 MG/ML
0-50 INJECTION, EMULSION INTRAVENOUS CONTINUOUS
Status: DISCONTINUED | OUTPATIENT
Start: 2023-11-06 | End: 2023-11-08

## 2023-11-06 RX ORDER — LORAZEPAM 2 MG/ML
INJECTION INTRAMUSCULAR
Status: COMPLETED
Start: 2023-11-06 | End: 2023-11-06

## 2023-11-06 RX ORDER — AMIODARONE HYDROCHLORIDE 200 MG/1
200 TABLET ORAL 2 TIMES DAILY
Status: DISCONTINUED | OUTPATIENT
Start: 2023-11-06 | End: 2023-11-13 | Stop reason: HOSPADM

## 2023-11-06 RX ORDER — LORAZEPAM 2 MG/ML
2 INJECTION INTRAMUSCULAR
Status: COMPLETED | OUTPATIENT
Start: 2023-11-06 | End: 2023-11-07

## 2023-11-06 RX ORDER — IPRATROPIUM BROMIDE AND ALBUTEROL SULFATE 2.5; .5 MG/3ML; MG/3ML
3 SOLUTION RESPIRATORY (INHALATION) EVERY 6 HOURS
Status: DISCONTINUED | OUTPATIENT
Start: 2023-11-06 | End: 2023-11-13 | Stop reason: HOSPADM

## 2023-11-06 RX ADMIN — ATORVASTATIN CALCIUM 40 MG: 40 TABLET, FILM COATED ORAL at 08:11

## 2023-11-06 RX ADMIN — CEFEPIME 1 G: 1 INJECTION, POWDER, FOR SOLUTION INTRAMUSCULAR; INTRAVENOUS at 08:11

## 2023-11-06 RX ADMIN — ALLOPURINOL 50 MG: 300 TABLET ORAL at 08:11

## 2023-11-06 RX ADMIN — METHYLPREDNISOLONE SODIUM SUCCINATE 60 MG: 40 INJECTION, POWDER, FOR SOLUTION INTRAMUSCULAR; INTRAVENOUS at 08:11

## 2023-11-06 RX ADMIN — IPRATROPIUM BROMIDE AND ALBUTEROL SULFATE 3 ML: 2.5; .5 SOLUTION RESPIRATORY (INHALATION) at 04:11

## 2023-11-06 RX ADMIN — MUPIROCIN 1 G: 20 OINTMENT TOPICAL at 08:11

## 2023-11-06 RX ADMIN — INSULIN ASPART 1 UNITS: 100 INJECTION, SOLUTION INTRAVENOUS; SUBCUTANEOUS at 12:11

## 2023-11-06 RX ADMIN — IPRATROPIUM BROMIDE AND ALBUTEROL SULFATE 3 ML: 2.5; .5 SOLUTION RESPIRATORY (INHALATION) at 07:11

## 2023-11-06 RX ADMIN — DEXMEDETOMIDINE HYDROCHLORIDE 1.4 MCG/KG/HR: 400 INJECTION INTRAVENOUS at 05:11

## 2023-11-06 RX ADMIN — CEFTRIAXONE SODIUM 1 G: 1 INJECTION, POWDER, FOR SOLUTION INTRAMUSCULAR; INTRAVENOUS at 11:11

## 2023-11-06 RX ADMIN — PANTOPRAZOLE SODIUM 40 MG: 40 INJECTION, POWDER, FOR SOLUTION INTRAVENOUS at 08:11

## 2023-11-06 RX ADMIN — AMIODARONE HYDROCHLORIDE 0.5 MG/MIN: 1.8 INJECTION, SOLUTION INTRAVENOUS at 09:11

## 2023-11-06 RX ADMIN — IPRATROPIUM BROMIDE AND ALBUTEROL SULFATE 3 ML: 2.5; .5 SOLUTION RESPIRATORY (INHALATION) at 08:11

## 2023-11-06 RX ADMIN — PROPOFOL 35 MCG/KG/MIN: 10 INJECTION, EMULSION INTRAVENOUS at 11:11

## 2023-11-06 RX ADMIN — CLOPIDOGREL BISULFATE 75 MG: 75 TABLET, FILM COATED ORAL at 08:11

## 2023-11-06 RX ADMIN — PROPOFOL 50 MCG/KG/MIN: 10 INJECTION, EMULSION INTRAVENOUS at 12:11

## 2023-11-06 RX ADMIN — AMIODARONE HYDROCHLORIDE 0.5 MG/MIN: 1.8 INJECTION, SOLUTION INTRAVENOUS at 01:11

## 2023-11-06 RX ADMIN — DEXMEDETOMIDINE HYDROCHLORIDE 1.2 MCG/KG/HR: 400 INJECTION INTRAVENOUS at 02:11

## 2023-11-06 RX ADMIN — ENOXAPARIN SODIUM 70 MG: 80 INJECTION SUBCUTANEOUS at 04:11

## 2023-11-06 RX ADMIN — FUROSEMIDE 40 MG: 10 INJECTION, SOLUTION INTRAMUSCULAR; INTRAVENOUS at 08:11

## 2023-11-06 RX ADMIN — PROPOFOL 30 MCG/KG/MIN: 10 INJECTION, EMULSION INTRAVENOUS at 03:11

## 2023-11-06 RX ADMIN — ASPIRIN 81 MG 81 MG: 81 TABLET ORAL at 08:11

## 2023-11-06 RX ADMIN — AMIODARONE HYDROCHLORIDE 200 MG: 200 TABLET ORAL at 08:11

## 2023-11-06 RX ADMIN — IPRATROPIUM BROMIDE AND ALBUTEROL SULFATE 3 ML: 2.5; .5 SOLUTION RESPIRATORY (INHALATION) at 01:11

## 2023-11-06 RX ADMIN — DEXMEDETOMIDINE HYDROCHLORIDE 1.4 MCG/KG/HR: 400 INJECTION INTRAVENOUS at 09:11

## 2023-11-06 RX ADMIN — IPRATROPIUM BROMIDE AND ALBUTEROL SULFATE 3 ML: 2.5; .5 SOLUTION RESPIRATORY (INHALATION) at 12:11

## 2023-11-06 RX ADMIN — LORAZEPAM 2 MG: 2 INJECTION INTRAMUSCULAR; INTRAVENOUS at 03:11

## 2023-11-06 RX ADMIN — MORPHINE SULFATE 4 MG: 4 INJECTION, SOLUTION INTRAMUSCULAR; INTRAVENOUS at 03:11

## 2023-11-06 NOTE — NURSING
Pt managed to sit up and extubate self.  Pt remained pink with resp 40 and labored hr133 bp 150/70.  Dr Li and Dr Correa to bed side immediately intubated wth 7.5 at 23 r gums.  Pt with full lung sounds r&l lungs post cxr done.  Place pt sedation to propofol pt anny well. Called pt daughter and notified of event. Daughter thankful for care

## 2023-11-06 NOTE — PLAN OF CARE
Problem: Feeding Intolerance (Enteral Nutrition)  Goal: Feeding Tolerance  Intervention: Prevent and Manage Feeding Intolerance  Flowsheets (Taken 11/6/2023 1219)  Nutrition Support Management: tube feeding initiated

## 2023-11-06 NOTE — CONSULTS
"[facility]  Department of Cardiology  Consult Note      PATIENT NAME: Marisol Kerr  MRN: [id number]  TODAY'S DATE: 11/06/2023  ADMIT DATE: 11/4/2023                          CONSULT REQUESTED BY: Radha Maravilla MD    SUBJECTIVE     PRINCIPAL PROBLEM: Acute respiratory failure with hypoxia and hypercarbia      REASON FOR CONSULT:  Elevated troponin, ADHF      HPI:  Pt intubated/sedated. No family present.  HPI taken from medical record:    "Marisol Kerr is a 78 y.o. White female   With PMH of HFpEF, COPD,   CAD, DM2, HTN,   who presents with SOB.     Pt currently intubated  Per EMS report, pt was found gasping for air  Said "help me, help me"  then went unresponsive  She was seated, no head trauma  Pt intubated before arrival to ER  No cardiac arrest occured     Pt was DNR, DNI + on hospice  but hospice + DNR/DNI has been revoked by her daughter"         Review of patient's allergies indicates:   Allergen Reactions    Iodinated contrast media     Strawberries [strawberry] Hives and Itching       Past Medical History:   Diagnosis Date    CAD (coronary artery disease)     Cancer     colon    CHF (congestive heart failure)     Colitis     Colon cancer     COPD (chronic obstructive pulmonary disease)     Decreased hearing     Diabetes mellitus     Diabetes mellitus, type 2     Hypercholesterolemia     Hypertension     Hypoxemia     Insomnia     Obesity     Osteoarthritis      Past Surgical History:   Procedure Laterality Date    ANGIOGRAM, CORONARY, WITH LEFT HEART CATHETERIZATION N/A 2/10/2022    Procedure: Angiogram, Coronary, with Left Heart Cath;  Surgeon: Cristian Benjamin MD;  Location: [facility] CATH/EP LAB;  Service: Cardiology;  Laterality: N/A;    CARDIAC CATHETERIZATION      CHOLECYSTECTOMY      COLECTOMY      CORONARY ANGIOGRAPHY N/A 8/29/2023    Procedure: ANGIOGRAM, CORONARY ARTERY;  Surgeon: Nba Riggs MD;  Location: [facility] CATH LAB;  Service: Cardiology;  Laterality: N/A;    CORONARY " ANGIOPLASTY      CORONARY STENT PLACEMENT      ERCP N/A 2023    Procedure: ERCP (ENDOSCOPIC RETROGRADE CHOLANGIOPANCREATOGRAPHY);  Surgeon: Biju Kramer III, MD;  Location: [facility] ENDO;  Service: Endoscopy;  Laterality: N/A;    ESOPHAGOGASTRODUODENOSCOPY N/A 2023    Procedure: EGD (ESOPHAGOGASTRODUODENOSCOPY);  Surgeon: Aarti Alvarez MD;  Location: [facility] ENDO;  Service: Endoscopy;  Laterality: N/A;    EXTERNAL EAR SURGERY      EYE SURGERY      FLEXIBLE SIGMOIDOSCOPY N/A 2019    Procedure: SIGMOIDOSCOPY, FLEXIBLE;  Surgeon: Biju Kramer III, MD;  Location: [facility] ENDO;  Service: Endoscopy;  Laterality: N/A;    HYSTERECTOMY      partial    INSTANTANEOUS WAVE-FREE RATIO (IFR) N/A 2023    Procedure: Instantaneous Wave-Free Ratio (IFR);  Surgeon: Nba Riggs MD;  Location: [facility] CATH LAB;  Service: Cardiology;  Laterality: N/A;    STENT, DRUG ELUTING, SINGLE VESSEL, CORONARY N/A 2023    Procedure: Stent, Drug Eluting, Single Vessel, Coronary;  Surgeon: Nba Riggs MD;  Location: [facility] CATH LAB;  Service: Cardiology;  Laterality: N/A;     Social History     Tobacco Use    Smoking status: Former     Current packs/day: 0.00     Average packs/day: 3.0 packs/day for 50.0 years (150.1 ttl pk-yrs)     Types: Cigarettes     Start date: 3/30/1964     Quit date: 2006     Years since quittin.6    Smokeless tobacco: Never    Tobacco comments:     ex smoker 15 years   Substance Use Topics    Alcohol use: Yes    Drug use: No        REVIEW OF SYSTEMS  intubated    OBJECTIVE     VITAL SIGNS (Most Recent)  Temp: 96.8 °F (36 °C) (23)  Pulse: 79 (23)  Resp: (!) 34 (23)  BP: (!) 169/74 (23)  SpO2: 98 % (23)    VENTILATION STATUS  Resp: (!) 34 (23)  SpO2: 98 % (11/06/23 0938)  Vent Mode: A/C  Oxygen Concentration (%):  [35-40] 35  Resp Rate Total:  [18 br/min-35 br/min] 18 br/min  Vt Set:  [350 mL] 350 mL  PEEP/CPAP:   [5 cmH20] 5 cmH20  Pressure Support:  [10 cmH20] 10 cmH20  Mean Airway Pressure:  [9.2 ouG25-23 cmH20] 9.2 cmH20        I & O (Last 24H):  Intake/Output Summary (Last 24 hours) at 11/6/2023 0941  Last data filed at 11/6/2023 0554  Gross per 24 hour   Intake 374.54 ml   Output 1850 ml   Net -1475.46 ml       WEIGHTS  Wt Readings from Last 3 Encounters:   11/06/23 0400 68.9 kg (151 lb 14.4 oz)   11/05/23 0400 80 kg (176 lb 5.9 oz)   11/04/23 1520 74.9 kg (165 lb 2 oz)   11/04/23 1048 76.9 kg (169 lb 8.5 oz)   09/05/23 0415 74.6 kg (164 lb 7.4 oz)   09/04/23 0522 73.7 kg (162 lb 7.7 oz)   09/01/23 2241 69 kg (152 lb 1.9 oz)   09/01/23 0603 81.2 kg (179 lb 0.2 oz)   08/31/23 0442 80.2 kg (176 lb 12.9 oz)   08/30/23 0357 81 kg (178 lb 9.2 oz)   08/29/23 1146 77.2 kg (170 lb 3.1 oz)   08/28/23 0400 77.2 kg (170 lb 3.1 oz)   08/27/23 0714 75 kg (165 lb 5.5 oz)   08/27/23 0400 75 kg (165 lb 5.5 oz)   08/26/23 0230 73.4 kg (161 lb 13.1 oz)   08/25/23 2211 68 kg (150 lb)       PHYSICAL EXAM    GENERAL: critically ill appearing elderly female resting/sedated.  HEENT: Normocephalic.    NECK: No JVD.   CARDIAC: Irregular rate and rhythm.   CHEST ANATOMY: normal.   LUNGS: Breathing per vent. Crackles to bases  ABDOMEN: Soft obese, OG Feeding tube present; + bowel sounds.    EXTREMITIES: No edema  CENTRAL NERVOUS SYSTEM: sedated  SKIN: No rash     HOME MEDICATIONS:  No current facility-administered medications on file prior to encounter.     Current Outpatient Medications on File Prior to Encounter   Medication Sig Dispense Refill    albuterol (VENTOLIN HFA) 90 mcg/actuation inhaler Inhale 2 puffs into the lungs every 6 (six) hours as needed for Wheezing or Shortness of Breath. Rescue 36 g 3    albuterol-ipratropium (DUO-NEB) 2.5 mg-0.5 mg/3 mL nebulizer solution Take 3 mLs by nebulization every 4 (four) hours as needed for Wheezing or Shortness of Breath. Rescue 120 each 6    allopurinoL (ZYLOPRIM) 100 MG tablet Take 0.5 tablets  (50 mg total) by mouth once daily. 45 tablet 1    ALPRAZolam (XANAX) 0.25 MG tablet Take 1 tablet (0.25 mg total) by mouth every evening. 90 tablet 1    amLODIPine (NORVASC) 10 MG tablet Take 1 tablet (10 mg total) by mouth once daily. 30 tablet 11    aspirin 81 MG Chew Chew and swallow 1 tablet (81 mg total) by mouth once daily. 30 tablet 11    atorvastatin (LIPITOR) 40 MG tablet Take 1 tablet (40 mg total) by mouth once daily. 90 tablet 3    cholestyramine-aspartame (QUESTRAN/PREVALITE LIGHT) 4 gram Powd Take 4 g by mouth once daily.      clopidogreL (PLAVIX) 75 mg tablet Take 1 tablet (75 mg total) by mouth once daily. 30 tablet 11    coenzyme Q10 100 mg capsule Take 100 mg by mouth every morning.      dexAMETHasone (DECADRON) 4 MG Tab Take 4 mg by mouth Daily.      ergocalciferol (VITAMIN D2) 50,000 unit Cap Take 1 capsule (50,000 Units total) by mouth every 7 days. 12 capsule 1    ferrous sulfate 325 (65 FE) MG EC tablet Take 325 mg by mouth once daily.      fish oil-omega-3 fatty acids 300-1,000 mg capsule Take 2 capsules by mouth every morning.      furosemide (LASIX) 20 MG tablet Take 1 tablet (20 mg total) by mouth every other day. TAKE WITH POTASSIUM 30 tablet 4    gabapentin (NEURONTIN) 300 MG capsule Take 300 mg by mouth Daily.      ipratropium-albuteroL (COMBIVENT RESPIMAT)  mcg/actuation inhaler Inhale 2 puffs into the lungs every 4 (four) hours as needed for Shortness of Breath. Rescue 12 g 3    ketoconazole (NIZORAL) 2 % cream Apply 1 application  topically 2 (two) times daily.      magnesium oxide (MAG-OX) 400 mg (241.3 mg magnesium) tablet Take 1 tablet by mouth 2 (two) times daily.      metoprolol succinate (TOPROL-XL) 200 MG 24 hr tablet Take 1 tablet (200 mg total) by mouth once daily. 30 tablet 5    mupirocin (BACTROBAN) 2 % ointment Apply topically 2 (two) times daily. (Patient taking differently: Apply 1 g topically 2 (two) times daily.) 30 g 3    nitroGLYCERIN (NITROSTAT) 0.4 MG SL  tablet Place 1 tablet (0.4 mg total) under the tongue every 5 (five) minutes as needed for Chest pain. Up to 3 doses. If chest pain is not relieved or worsens 3 to 5 minutes after 1 dose, call 911 and seek immediate emergency medical attention. 25 tablet 2    pantoprazole (PROTONIX) 40 MG tablet Take 1 tablet (40 mg total) by mouth once daily. 90 tablet 3    potassium chloride (MICRO-K) 10 MEQ CpSR Take 1 capsule (10 mEq total) by mouth every other day. (TAKE WITH LASIX/FUROSEMIDE) 15 capsule 0    triamcinolone acetonide 0.1% (KENALOG) 0.1 % cream Apply 1 g topically 2 (two) times daily.      umeclidinium (INCRUSE ELLIPTA) 62.5 mcg/actuation inhalation capsule Inhale 62.5 mcg into the lungs every morning. Controller 90 each 3    vit C/E/Zn/coppr/lutein/zeaxan (PRESERVISION AREDS-2 ORAL) Take 1 tablet by mouth once daily.      VITAMIN C 500 MG tablet Take 500 mg by mouth 2 (two) times daily.      [DISCONTINUED] benazepriL (LOTENSIN) 40 MG tablet Take 1 tablet (40 mg total) by mouth once daily. 90 tablet 1    [DISCONTINUED] cimetidine (TAGAMET) 300 MG tablet Take 1 tablet (300 mg total) by mouth every 6 (six) hours. Take 1 tablet (300 mg total) by mouth starting at 6 PM on Sunday April 17, 2022 (the evening before your angiogram procedure). Then take 1 tablet at 12 AM, then take 1 tablet at 6 AM on Monday April 18th. 3 tablet 0    [DISCONTINUED] lisinopriL 10 MG tablet Take 1 tablet (10 mg total) by mouth once daily. HOLD UNTIL OTHERWISE DIRECTED BY PRIMARY CARE PROVIDER 90 tablet 3    [DISCONTINUED] lovastatin (MEVACOR) 40 MG tablet Take 1 tablet (40 mg total) by mouth every other day. 45 tablet 3       SCHEDULED MEDS:   albuterol-ipratropium  3 mL Nebulization Q4H    allopurinoL  50 mg Oral Daily    aspirin  81 mg Oral Daily    atorvastatin  40 mg Oral QHS    ceFEPime (MAXIPIME) IVPB  1 g Intravenous Q24H    clopidogreL  75 mg Oral Daily    enoxparin  1 mg/kg Subcutaneous Q24H (treatment, non-standard time)     "furosemide (LASIX) injection  40 mg Intravenous Daily    methylPREDNISolone sodium succinate injection  60 mg Intravenous Daily    mupirocin   Nasal BID    pantoprazole  40 mg Intravenous Daily    potassium, sodium phosphates  1 packet Oral Once       CONTINUOUS INFUSIONS:   amiodarone in dextrose 5% 0.5 mg/min (11/06/23 0907)    dexmedeTOMIDine (Precedex) infusion (titrating) 1.4 mcg/kg/hr (11/06/23 0902)    fentanyl Stopped (11/06/23 0300)       PRN MEDS:acetaminophen, albuterol-ipratropium, calcium gluconate IVPB, calcium gluconate IVPB, calcium gluconate IVPB, dextrose 50%, dextrose 50%, glucagon (human recombinant), glucose, glucose, HYDROcodone-acetaminophen, insulin aspart U-100, lorazepam, magnesium sulfate IVPB, magnesium sulfate IVPB, melatonin, metoprolol, morphine, naloxone, ondansetron, polyethylene glycol, potassium chloride **AND** potassium chloride **AND** potassium chloride, senna-docusate 8.6-50 mg, simethicone, sodium chloride 0.9%, sodium phosphate 15 mmol in dextrose 5 % (D5W) 250 mL IVPB, sodium phosphate 20.01 mmol in dextrose 5 % (D5W) 250 mL IVPB, sodium phosphate 30 mmol in dextrose 5 % (D5W) 250 mL IVPB    LABS AND DIAGNOSTICS     CBC LAST 3 DAYS  Recent Labs   Lab 11/04/23  2230 11/05/23  0505 11/06/23  0401 11/06/23  0526 11/06/23  0930   WBC 10.21 15.68* 11.56  --   --    RBC 2.82* 3.11* 3.30*  --   --    HGB 7.8* 8.6* 8.8*  --   --    HCT 27.4* 29.8* 29.8* 26* 27*   MCV 97 96 90  --   --    MCH 27.7 27.7 26.7*  --   --    MCHC 28.5* 28.9* 29.5*  --   --    RDW 14.4 14.8* 15.2*  --   --     180 185  --   --    MPV 12.2 12.4 11.9  --   --    GRAN 92.5*  9.4* 79.5*  12.5* 83.8*  9.7*  --   --    LYMPH 2.8*  0.3* 7.8*  1.2 9.6*  1.1  --   --    MONO 4.0  0.4 11.9  1.9* 5.1  0.6  --   --    BASO 0.01 0.03 0.03  --   --    NRBC 0 0 0  --   --        COAGULATION LAST 3 DAYS  No results for input(s): "LABPT", "INR", "APTT" in the last 168 hours.    CHEMISTRY LAST 3 " DAYS  Recent Labs   Lab 11/04/23  1057 11/04/23  1059 11/05/23  0505 11/05/23  0812 11/05/23  0913 11/05/23  1223 11/05/23 2004 11/06/23  0012 11/06/23  0401 11/06/23  0526 11/06/23  0930      < > 144  144   < >  --    < > 142 140 142  142  --   --    K 6.2*   < > 5.2*  5.2*   < >  --    < > 5.0 4.8 4.7  4.7  --   --       < > 101  101   < >  --    < > 98 97 97  97  --   --    CO2 36*   < > 35*  35*   < >  --    < > 35* 38* 38*  38*  --   --    ANIONGAP 0*   < > 8  8   < >  --    < > 9 5* 7*  7*  --   --    BUN 38*   < > 43*  43*   < >  --    < > 45* 46* 45*  45*  --   --    CREATININE 1.6*   < > 1.6*  1.6*   < >  --    < > 1.6* 1.8* 1.9*  1.9*  --   --    *   < > 106  106   < >  --    < > 171* 181* 155*  155*  --   --    CALCIUM 9.0   < > 8.8  8.8   < >  --    < > 8.4* 8.6* 8.4*  8.4*  --   --    PH  --    < >  --   --  7.465*  --   --   --   --  7.481* 7.517*   MG  --   --  2.3  --   --   --   --   --  2.2  --   --    ALBUMIN 3.6  --  2.9*  --   --   --   --   --  3.0*  --   --    PROT 6.6  --  5.5*  --   --   --   --   --  5.7*  --   --    ALKPHOS 85  --  57  --   --   --   --   --  59  --   --    ALT 11  --  9*  --   --   --   --   --  8*  --   --    AST 14  --  15  --   --   --   --   --  15  --   --    BILITOT 0.2  --  0.3  --   --   --   --   --  0.3  --   --     < > = values in this interval not displayed.       CARDIAC PROFILE LAST 3 DAYS  Recent Labs   Lab 11/04/23  1057   BNP 1,995*       ENDOCRINE LAST 3 DAYS  Recent Labs   Lab 11/04/23  1057   PROCAL 0.149       LAST ARTERIAL BLOOD GAS  ABG  Recent Labs   Lab 11/06/23  0930   PH 7.517*   PO2 73*   PCO2 46.7*   HCO3 37.8*   BE 15*       LAST 7 DAYS MICROBIOLOGY   Microbiology Results (last 7 days)       Procedure Component Value Units Date/Time    Urine culture [[id number]] Collected: 11/04/23 1134    Order Status: Completed Specimen: Urine from Clean Catch Updated: 11/06/23 0728     Urine Culture, Routine No growth  to date    Culture, Respiratory with Gram Stain [[id number]]  (Abnormal)  (Susceptibility) Collected: 11/04/23 1122    Order Status: Completed Specimen: Respiratory from Sputum Updated: 11/06/23 0706     Respiratory Culture KLEBSIELLA PNEUMONIAE  Few       Gram Stain (Respiratory) <10 epithelial cells per low power field.     Gram Stain (Respiratory) Few WBC's     Gram Stain (Respiratory) Few Gram negative rods    Blood Culture #2 **CANNOT BE ORDERED STAT** [[id number]] Collected: 11/04/23 1552    Order Status: Completed Specimen: Blood Updated: 11/05/23 1632     Blood Culture, Routine No Growth to date      No Growth to date    Narrative:      Collection has been rescheduled by [place] at 11/04/2023 12:28 Reason:   Unable to collect 2nd set  Collection has been rescheduled by [place] at 11/04/2023 12:28 Reason:   Unable to collect 2nd set    Blood Culture #1 **CANNOT BE ORDERED STAT** [[id number]] Collected: 11/04/23 1204    Order Status: Completed Specimen: Blood Updated: 11/05/23 1432     Blood Culture, Routine No Growth to date      No Growth to date    MRSA Screen by PCR [[id number]]  (Abnormal) Collected: 11/04/23 1609    Order Status: Completed Specimen: Nasopharyngeal Swab from Nasal Updated: 11/04/23 1736     MRSA SCREEN BY PCR Positive     Comment: Positive MRSA by PCR called and verbal readback obtained from DEBBIE Leal, RN. by [facility] 11/04/2023 17:36         Narrative:      Positive MRSA by PCR called and verbal readback obtained from DEBBIE Leal, RN. by [facility] 11/04/2023 17:36    Urine Culture High Risk [[id number]]     Order Status: Completed Specimen: Urine             MOST RECENT IMAGING  X-Ray Chest AP Portable  CLINICAL HISTORY:  78 years (1945) Female Intubated    TECHNIQUE:  Portable AP radiograph the chest. One view.    COMPARISON:  Radiograph from November 5, 2023    FINDINGS:  There is patchy opacity at both lung bases characteristic of a combination of atelectasis and effusions. No  pneumothorax is identified. The heart is top normal in size. Atheromatous calcifications are seen at the aortic arch. Osseous structures show degenerative changes in the spine. The upper abdomen shows a small size hiatal hernia.    Lines and tubes: Endotracheal tube with tip projecting 5 cm from juan j. An enteric tube with tip below the field of view (subdiaphragmatic).    IMPRESSION:  Small bilateral pleural effusions and adjacent atelectasis/consolidation, similar to the previous exam.    .    Electronically signed by:  García Russell MD  11/06/2023 07:03 AM [facility] Workstation: [id number]      ECHOCARDIOGRAM RESULTS (last 5)  Results for orders placed during the hospital encounter of 08/25/23    Echo Saline Bubble? No    Interpretation Summary    Left Ventricle: The left ventricle is normal in size. Moderately increased ventricular mass. Moderately increased wall thickness. There is moderate concentrict hypertrophy. Normal wall motion. There is normal systolic function.  EF 61% There is normal diastolic function.    Left Atrium: Left atrium is moderately dilated.    Right Ventricle: Normal right ventricular cavity size. Wall thickness is normal. Right ventricle wall motion  is normal. Systolic function is normal.    Mitral Valve: Mildly thickened anterior leaflet. Mild mitral annular calcification.    IVC/SVC: Normal venous pressure at 3 mmHg.    Pericardium: There is an effusion.    Limited echo; no gross pulmonary hypertension but poor visualization of tricuspid signal    No tissue Doppler performed to assess mean left atrial pressure      Results for orders placed during the hospital encounter of 07/27/23    Echo    Interpretation Summary  · Normal systolic function.  · Normal left ventricular diastolic function.  · The estimated ejection fraction is 60%.  · Atrial fibrillation not observed.  · Normal right ventricular size with normal right ventricular systolic function.  · Mild left atrial enlargement.  ·  Aortic valve is calcified but there does not appear to be significant aortic stenosis-  · Aortic valve area is cm2; peak velocity is 1.47 m/s; mean gradient is 5 mmHg.  · Mild mitral regurgitation.  · Mild tricuspid regurgitation.  · Normal central venous pressure (3 mmHg).  · The estimated PA systolic pressure is 20 mmHg.      Results for orders placed during the hospital encounter of 05/25/23    Echo    Interpretation Summary  · The left ventricle is normal in size with concentric remodeling and normal systolic function.  · The estimated ejection fraction is 65%.  · Indeterminate left ventricular diastolic function.  · Mild to moderate tricuspid regurgitation.  · Mild pulmonic regurgitation.  · Normal right ventricular size with normal right ventricular systolic function.  · Normal central venous pressure (3 mmHg).  · The estimated PA systolic pressure is 32 mmHg.  · Mild mitral regurgitation.      Results for orders placed during the hospital encounter of 01/15/23    Echo    Interpretation Summary  · The left ventricle is normal in size with moderate concentric hypertrophy and normal systolic function.  · Sinus tachycardia heart rate 110  · The estimated ejection fraction is 70%.  · Indeterminate left ventricular diastolic function with normal left atrial pressure.  · Atrial fibrillation not observed.  · Normal right ventricular size with normal right ventricular systolic function.  · Mild mitral regurgitation.  · Normal central venous pressure (3 mmHg).      Results for orders placed during the hospital encounter of 04/08/22    Echo    Interpretation Summary  · Limited study for wall morgan. several off axis views.  · The estimated ejection fraction is 60%.  · The left ventricle is normal in size with normal systolic function.  · The following segments are hypokinetic: basal inferoseptal and basal inferior. All other segments are normal.  · Normal right ventricular size with normal right ventricular systolic  function.      CURRENT/PREVIOUS VISIT EKG  Results for orders placed or performed during the hospital encounter of 11/04/23   EKG 12-lead    Collection Time: 11/04/23 10:47 AM    Narrative    Test Reason : R41.82,    Vent. Rate : 087 BPM     Atrial Rate : 087 BPM     P-R Int : 184 ms          QRS Dur : 094 ms      QT Int : 344 ms       P-R-T Axes : 081 068 090 degrees     QTc Int : 413 ms    Normal sinus rhythm  Septal infarct ,age undetermined  Abnormal ECG  When compared with ECG of 31-AUG-2023 18:18,  Nonspecific T wave abnormality now evident in Anterior-lateral leads    Referred By: [email or web address]   SELF           Confirmed By:            ASSESSMENT/PLAN:     Active Hospital Problems    Diagnosis    *Acute respiratory failure with hypoxia and hypercarbia    Suspected Pneumonia    COPD exacerbation    Acute on chronic heart failure with preserved ejection fraction (HFpEF)    Hyperkalemia    Leukocytosis    Chronic kidney disease, stage 4 (severe)    DM type 2, controlled, with complication    Essential hypertension, benign       ASSESSMENT & PLAN:   Acute respiratory failure  COPD exacerbation  Pneumonia  Elevated troponin  Acute on chronic HFpEF  HTN  CKD  DM      RECOMMENDATIONS:  Pt had episode of AF with RVR. Amiodarone drip was started. HR now controlled. Discontinued amiodarone drip and started PO amiodarone 200 mg BID.  Elevated troponin 2/2 multiple factors: CHF, Hypoxia, infection. Troponin has trended down  Continue aspirin, statin and plavix for history recent PCI (8/29/23)  Continue IV lasix for HFpEF exacerbation. Strict I/O.   Antibiotics for pneumonia on board    Arlin Murray NP  [facility]  Department of Cardiology  Date of Service: 11/06/2023        I have personally interviewed and examined the patient, I have reviewed the Nurse Practitioner's history and physical, assessment, and plan. I agree with the findings and plan.  Agree with IV LASIX.  CHANGE AMIODARONE TO PO.    Mundo Rodriges M.D.  [facility]  Department of Cardiology  Date of Service: 11/06/2023  9:41 AM

## 2023-11-06 NOTE — ASSESSMENT & PLAN NOTE
Creatine stable for now. BMP reviewed- noted Estimated Creatinine Clearance: 23.5 mL/min (A) (based on SCr of 1.9 mg/dL (H)). according to latest data. Based on current GFR, CKD stage is stage 3 - GFR 30-59.  Monitor UOP and serial BMP and adjust therapy as needed. Renally dose meds. Avoid nephrotoxic medications and procedures.

## 2023-11-06 NOTE — ASSESSMENT & PLAN NOTE
Continue to monitor, patient currently receiving inhalers and Lasix  The patients latest potassium has been reviewed and the results are listed below  Recent Labs   Lab 11/06/23  0401   K 4.7  4.7

## 2023-11-06 NOTE — PLAN OF CARE
Problem: Infection  Goal: Absence of Infection Signs and Symptoms  11/6/2023 0412 by Myrna Cobos RN  Outcome: Ongoing, Not Progressing  11/6/2023 0412 by Myrna Cobos RN  Outcome: Ongoing, Not Progressing     Problem: Adult Inpatient Plan of Care  Goal: Plan of Care Review  11/6/2023 0412 by Myrna Cobso RN  Outcome: Ongoing, Not Progressing  11/6/2023 0412 by Myrna Cobos RN  Outcome: Ongoing, Not Progressing  Goal: Patient-Specific Goal (Individualized)  11/6/2023 0412 by Myrna Cobos RN  Outcome: Ongoing, Not Progressing  11/6/2023 0412 by Myrna Cobos RN  Outcome: Ongoing, Not Progressing  Goal: Absence of Hospital-Acquired Illness or Injury  11/6/2023 0412 by Myrna Cobos RN  Outcome: Ongoing, Not Progressing  11/6/2023 0412 by Myrna Cobos RN  Outcome: Ongoing, Not Progressing  Goal: Optimal Comfort and Wellbeing  11/6/2023 0412 by Myrna Cobos RN  Outcome: Ongoing, Not Progressing  11/6/2023 0412 by Myrna Cobos RN  Outcome: Ongoing, Not Progressing  Goal: Readiness for Transition of Care  11/6/2023 0412 by Myrna Cobos RN  Outcome: Ongoing, Not Progressing  11/6/2023 0412 by Myrna Cobos RN  Outcome: Ongoing, Not Progressing     Problem: Diabetes Comorbidity  Goal: Blood Glucose Level Within Targeted Range  11/6/2023 0412 by Myrna Cobos RN  Outcome: Ongoing, Not Progressing  11/6/2023 0412 by Myrna Cobos RN  Outcome: Ongoing, Not Progressing     Problem: Fluid Imbalance (Pneumonia)  Goal: Fluid Balance  11/6/2023 0412 by Myrna Cobos RN  Outcome: Ongoing, Not Progressing  11/6/2023 0412 by Myrna Cobos RN  Outcome: Ongoing, Not Progressing     Problem: Infection (Pneumonia)  Goal: Resolution of Infection Signs and Symptoms  11/6/2023 0412 by Myrna Cobos RN  Outcome: Ongoing, Not Progressing  11/6/2023 0412 by Sekinger, Myrna, RN  Outcome: Ongoing, Not Progressing     Problem: Respiratory Compromise (Pneumonia)  Goal:  Effective Oxygenation and Ventilation  11/6/2023 0412 by Myrna Cobos RN  Outcome: Ongoing, Not Progressing  11/6/2023 0412 by Myrna Cobos RN  Outcome: Ongoing, Not Progressing     Problem: Impaired Wound Healing  Goal: Optimal Wound Healing  11/6/2023 0412 by Myrna Cobos RN  Outcome: Ongoing, Not Progressing  11/6/2023 0412 by Myrna Cobos RN  Outcome: Ongoing, Not Progressing     Problem: Communication Impairment (Mechanical Ventilation, Invasive)  Goal: Effective Communication  11/6/2023 0412 by Myrna Cobos RN  Outcome: Ongoing, Not Progressing  11/6/2023 0412 by Myrna Cobos RN  Outcome: Ongoing, Not Progressing     Problem: Device-Related Complication Risk (Mechanical Ventilation, Invasive)  Goal: Optimal Device Function  11/6/2023 0412 by Myrna Cobos RN  Outcome: Ongoing, Not Progressing  11/6/2023 0412 by Myrna Cobos RN  Outcome: Ongoing, Not Progressing     Problem: Inability to Wean (Mechanical Ventilation, Invasive)  Goal: Mechanical Ventilation Liberation  11/6/2023 0412 by Myrna Cobos RN  Outcome: Ongoing, Not Progressing  11/6/2023 0412 by Myrna Cobos RN  Outcome: Ongoing, Not Progressing     Problem: Nutrition Impairment (Mechanical Ventilation, Invasive)  Goal: Optimal Nutrition Delivery  11/6/2023 0412 by Myrna Cobos RN  Outcome: Ongoing, Not Progressing  11/6/2023 0412 by Myrna Cobos RN  Outcome: Ongoing, Not Progressing     Problem: Skin and Tissue Injury (Mechanical Ventilation, Invasive)  Goal: Absence of Device-Related Skin and Tissue Injury  11/6/2023 0412 by Myrna Cobos RN  Outcome: Ongoing, Not Progressing  11/6/2023 0412 by Myrna Cobos RN  Outcome: Ongoing, Not Progressing     Problem: Ventilator-Induced Lung Injury (Mechanical Ventilation, Invasive)  Goal: Absence of Ventilator-Induced Lung Injury  11/6/2023 0412 by Myrna Cobos RN  Outcome: Ongoing, Not Progressing  11/6/2023 0412 by Myrna Cobos  RN  Outcome: Ongoing, Not Progressing     Problem: Communication Impairment (Artificial Airway)  Goal: Effective Communication  11/6/2023 0412 by Myrna Cobos RN  Outcome: Ongoing, Not Progressing  11/6/2023 0412 by Myrna Cobos RN  Outcome: Ongoing, Not Progressing     Problem: Device-Related Complication Risk (Artificial Airway)  Goal: Optimal Device Function  11/6/2023 0412 by Myrna Cobos RN  Outcome: Ongoing, Not Progressing  11/6/2023 0412 by Myrna Cobos RN  Outcome: Ongoing, Not Progressing     Problem: Skin and Tissue Injury (Artificial Airway)  Goal: Absence of Device-Related Skin or Tissue Injury  11/6/2023 0412 by Myrna Cobos RN  Outcome: Ongoing, Not Progressing  11/6/2023 0412 by Myrna Cobos RN  Outcome: Ongoing, Not Progressing     Problem: Noninvasive Ventilation Acute  Goal: Effective Unassisted Ventilation and Oxygenation  11/6/2023 0412 by Myrna Cobos RN  Outcome: Ongoing, Not Progressing  11/6/2023 0412 by Myrna Cobos RN  Outcome: Ongoing, Not Progressing     Problem: Skin Injury Risk Increased  Goal: Skin Health and Integrity  11/6/2023 0412 by Myrna Cobos RN  Outcome: Ongoing, Not Progressing  11/6/2023 0412 by Myrna Cobos RN  Outcome: Ongoing, Not Progressing     Problem: Fall Injury Risk  Goal: Absence of Fall and Fall-Related Injury  11/6/2023 0412 by Myrna Cobos RN  Outcome: Ongoing, Not Progressing  11/6/2023 0412 by Myrna Cobos RN  Outcome: Ongoing, Not Progressing     Problem: Restraint, Nonbehavioral (Nonviolent)  Goal: Absence of Harm or Injury  11/6/2023 0412 by Myrna Cobos RN  Outcome: Ongoing, Not Progressing  11/6/2023 0412 by Myrna Cobos RN  Outcome: Ongoing, Not Progressing

## 2023-11-06 NOTE — PROGRESS NOTES
"[facility] Medicine  Progress Note    Patient Name: Marisol Kerr  MRN: [id number]  Patient Class: IP- Inpatient   Admission Date: 11/4/2023  Length of Stay: 2 days  Attending Physician: Radha Maravilla MD  Primary Care Provider: Khadar Dwyer MD        Subjective:     Principal Problem:Acute respiratory failure with hypoxia and hypercarbia        HPI:  Marisol Kerr is a 78 y.o. White female   With PMH of HFpEF, COPD,   CAD, DM2, HTN,   who presents with SOB.     Pt currently intubated  Per EMS report, pt was found gasping for air  Said "help me, help me"  then went unresponsive  She was seated, no head trauma  Pt intubated before arrival to ER  No cardiac arrest occured     Pt was DNR, DNI + on hospice  but hospice + DNR/DNI has been revoked by her daughter      Overview/Hospital Course:  Patient admitted for acute hypercapnic respiratory failure secondary to COPD exacerbation.  Chest x-ray showed possible aspiration.  Patient was intubated before arrival to the emergency room.  Admitted to the ICU.  Pulmonology consulted.  Patient started on pressor support, given IV diuretics for concern of fluid overload.  Started on empiric IV antibiotics.  Steroids and inhalers.  Weaned off pressors.  Difficult weaning off of mechanical ventilation.  Sputum cultures were positive for Klebsiella pneumoniae.      Interval History:  Patient remains intubated sedated, failed SBT this morning.  Creatinine rising.  We will wean Lasix.  Patient started on amiodarone drip per Cardiology after tele showed atrial fibrillation.    Review of Systems   Unable to perform ROS: Intubated     Objective:     Vital Signs (Most Recent):  Temp: 96.8 °F (36 °C) (11/06/23 0800)  Pulse: 70 (11/06/23 1210)  Resp: 18 (11/06/23 1210)  BP: (!) 114/54 (11/06/23 1210)  SpO2: 100 % (11/06/23 1210) Vital Signs (24h Range):  Temp:  [96.8 °F (36 °C)-97.9 °F (36.6 °C)] 96.8 °F (36 °C)  Pulse:  [] 70  Resp:  [18-40] 18  SpO2:  [98 " %-100 %] 100 %  BP: ()/(51-79) 114/54     Weight: 68.9 kg (151 lb 14.4 oz)  Body mass index is 26.91 kg/m².    Intake/Output Summary (Last 24 hours) at 11/6/2023 1228  Last data filed at 11/6/2023 0554  Gross per 24 hour   Intake 370.49 ml   Output 1580 ml   Net -1209.51 ml         Physical Exam  Vitals reviewed.   Constitutional:       Appearance: She is obese. She is ill-appearing.   HENT:      Head: Normocephalic and atraumatic.      Mouth/Throat:      Comments: Intubated  Eyes:      Extraocular Movements: Extraocular movements intact.      Pupils: Pupils are equal, round, and reactive to light.   Cardiovascular:      Rate and Rhythm: Regular rhythm. Tachycardia present.   Pulmonary:      Breath sounds: No wheezing.      Comments: Mechanical breath sounds  Abdominal:      General: Abdomen is flat.      Palpations: Abdomen is soft.   Neurological:      Comments: Sedated             Significant Labs: All pertinent labs within the past 24 hours have been reviewed.  CBC:   Recent Labs   Lab 11/04/23  2230 11/05/23  0505 11/06/23  0401 11/06/23  0526 11/06/23  0930   WBC 10.21 15.68* 11.56  --   --    HGB 7.8* 8.6* 8.8*  --   --    HCT 27.4* 29.8* 29.8* 26* 27*    180 185  --   --      CMP:   Recent Labs   Lab 11/05/23  0505 11/05/23  0812 11/05/23 2004 11/06/23  0012 11/06/23  0401     144   < > 142 140 142  142   K 5.2*  5.2*   < > 5.0 4.8 4.7  4.7     101   < > 98 97 97  97   CO2 35*  35*   < > 35* 38* 38*  38*     106   < > 171* 181* 155*  155*   BUN 43*  43*   < > 45* 46* 45*  45*   CREATININE 1.6*  1.6*   < > 1.6* 1.8* 1.9*  1.9*   CALCIUM 8.8  8.8   < > 8.4* 8.6* 8.4*  8.4*   PROT 5.5*  --   --   --  5.7*   ALBUMIN 2.9*  --   --   --  3.0*   BILITOT 0.3  --   --   --  0.3   ALKPHOS 57  --   --   --  59   AST 15  --   --   --  15   ALT 9*  --   --   --  8*   ANIONGAP 8  8   < > 9 5* 7*  7*    < > = values in this interval not displayed.       Significant  Imaging: I have reviewed all pertinent imaging results/findings within the past 24 hours.      Assessment/Plan:      * Acute respiratory failure with hypoxia and hypercarbia  Patient with Hypercapnic Respiratory failure which is Acute on chronic.  she is on home oxygen at 8 LPM. Supplemental oxygen was provided and noted- Vent Mode: A/C  Oxygen Concentration (%):  [35-40] 35  Resp Rate Total:  [18 br/min-35 br/min] 18 br/min  Vt Set:  [350 mL] 350 mL  PEEP/CPAP:  [5 cmH20] 5 cmH20  Mean Airway Pressure:  [9.2 [id number] cmH20] 9.6 cmH20    .   Signs/symptoms of respiratory failure include- increased work of breathing and respiratory distress. Contributing diagnoses includes - Aspiration and COPD Labs and images were reviewed. Patient Has recent ABG, which has been reviewed. Will treat underlying causes and adjust management of respiratory failure as follows- steroids, continue IV antibiotics, pulmonology consulted.  Daily weaning of mechanical ventilation.  Continue with inhalers.    Atrial fibrillation  No reported history of atrial fibrillation, EKG showed atrial fibrillation with rapid ventricular response  Cardiology notified, patient started amiodarone drip  Patient on full-dose Lovenox.    Pneumonia   Sputum cultures grew Klebsiella pneumoniae   discontinue vancomycin and cefepime, Rocephin started by pulmonology        COPD exacerbation  Patient's COPD is with exacerbation noted by continued dyspnea, use of accessory muscles for breathing and worsening of baseline hypoxia currently.  Patient is currently on COPD Pathway. Continue scheduled inhalers Steroids, Antibiotics and Supplemental oxygen and monitor respiratory status closely.     Patient with severe end-stage COPD, currently on home hospice.  On 8 L oxygen at home.  Discussions held with daughter about overall prognosis and difficulty weaning ventilation, daughter verbalized understanding.  We will continue with SBT trials daily.  Patient has been  unsuccessful with SBT trials.  Discussions held with pulmonology and daughter, likelihood of coming off the ventilator as low.  Patient code status DNR.    Acute on chronic heart failure with preserved ejection fraction (HFpEF)  Patient is identified as having Diastolic (HFpEF) heart failure that is Acute on chronic. CHF is currently controlled. Latest ECHO performed and demonstrates- Results for orders placed during the hospital encounter of 08/25/23    Echo Saline Bubble? No    Interpretation Summary    Left Ventricle: The left ventricle is normal in size. Moderately increased ventricular mass. Moderately increased wall thickness. There is moderate concentrict hypertrophy. Normal wall motion. There is normal systolic function.  EF 61% There is normal diastolic function.    Left Atrium: Left atrium is moderately dilated.    Right Ventricle: Normal right ventricular cavity size. Wall thickness is normal. Right ventricle wall motion  is normal. Systolic function is normal.    Mitral Valve: Mildly thickened anterior leaflet. Mild mitral annular calcification.    IVC/SVC: Normal venous pressure at 3 mmHg.    Pericardium: There is an effusion.    Limited echo; no gross pulmonary hypertension but poor visualization of tricuspid signal    No tissue Doppler performed to assess mean left atrial pressure    We will continue with IV diuretics.  Chest x-ray shows improvement.  Discontinue dobutamine.  If pressors needed we will start Levophed.  Strict I's and O's.  Wean Lasix to daily from b.i.d..  We will discontinue tomorrow if creatinine continues to rise.    Recent Labs   Lab 11/04/23  1057   BNP 1,995*   .    Hyperkalemia  Continue to monitor, patient currently receiving inhalers and Lasix  The patients latest potassium has been reviewed and the results are listed below  Recent Labs   Lab 11/06/23  0401   K 4.7  4.7           Leukocytosis  Likely secondary to aspiration pneumonia, patient also on steroids which  "can elevate white blood cells.  , reviewed CBC is white blood cells improving      Chronic kidney disease, stage 4 (severe)  Creatine stable for now. BMP reviewed- noted Estimated Creatinine Clearance: 23.5 mL/min (A) (based on SCr of 1.9 mg/dL (H)). according to latest data. Based on current GFR, CKD stage is stage 3 - GFR 30-59.  Monitor UOP and serial BMP and adjust therapy as needed. Renally dose meds. Avoid nephrotoxic medications and procedures.    DM type 2, controlled, with complication  Patient's FSGs are controlled on current medication regimen.  Last A1c reviewed-   Lab Results   Component Value Date    HGBA1C 5.0 08/25/2023     Most recent fingerstick glucose reviewed- No results for input(s): "POCTGLUCOSE" in the last 24 hours.  Current correctional scale  Low  Maintain anti-hyperglycemic dose as follows-   Antihyperglycemics (From admission, onward)    Start     Stop Route Frequency Ordered    11/04/23 1439  insulin aspart U-100 pen 0-10 Units         -- SubQ Every 6 hours PRN 11/04/23 1341        Hold Oral hypoglycemics while patient is in the hospital.    Essential hypertension, benign  Holding outpatient blood pressure medication given hypotension and pressor requirement   We will resume once blood pressure is more stable      VTE Risk Mitigation (From admission, onward)         Ordered     enoxaparin injection 70 mg  Every 24 hours         11/04/23 1641     IP VTE HIGH RISK PATIENT  Once         11/04/23 1341     Place sequential compression device  Until discontinued         11/04/23 1341                Discharge Planning   LUZ: 11/8/2023     Code Status: DNR   Is the patient medically ready for discharge?:     Reason for patient still in hospital (select all that apply): Patient unstable  Discharge Plan A: New Nursing Home placement - assisted care facility            Critical care time spent on the evaluation and treatment of severe organ dysfunction, review of pertinent labs and imaging " studies, discussions with consulting providers and discussions with patient/family: 33 minutes.      Radha Maravilla MD  Department of Hospital Medicine   Formerly [facility], [facility]

## 2023-11-06 NOTE — CARE UPDATE
continue ventilator with tx   11/06/23 0737   Patient Assessment/Suction   Level of Consciousness (AVPU) responds to pain   Respiratory Effort Normal;Unlabored   Expansion/Accessory Muscles/Retractions no use of accessory muscles;expansion symmetric   All Lung Fields Breath Sounds clear;diminished   WILMER Breath Sounds clear   LLL Breath Sounds diminished   RUL Breath Sounds clear   RML Breath Sounds diminished   RLL Breath Sounds diminished   Rhythm/Pattern, Respiratory assisted mechanically   Cough Frequency with stimulation   Cough Type assisted   Suction Method tracheal   Suction Pressure (mmHg) -120 mmHg   $ Suction Charges Inline Suction Procedure Stat Charge   Secretions Amount scant   Secretions Color clear   Secretions Characteristics thin   Skin Integrity   $ Wound Care Tech Time 15 min   Area Observed Upper lip;Lower lip;Corner lip   Skin Appearance without discoloration   PRE-TX-O2   Device (Oxygen Therapy) ventilator   $ Is the patient on Low Flow Oxygen? Yes   SpO2 100 %   Pulse Oximetry Type Continuous   $ Pulse Oximetry - Multiple Charge Pulse Oximetry - Multiple   Pulse 74   Resp (!) 28   Breath Sounds Post-Respiratory Treatment   Throughout All Fields Post-Treatment All Fields   Throughout All Fields Post-Treatment aeration increased   Post-treatment Heart Rate (beats/min) 74   Post-treatment Resp Rate (breaths/min) 28        Airway - Non-Surgical 11/04/23 1100 Endotracheal Tube   Placement Date/Time: 11/04/23 1100   Present Prior to Hospital Arrival?: Yes  Inserted by: EMS  Airway Device: Endotracheal Tube  Intubated: (c) Other (see comments)  Airway Device Size: 7.5  Style: Cuffed  Cuff Inflation: Minimal occlusive pressure  ...   Secured at 22 cm   Measured At Lips   Secured Location Center   Secured by Commercial tube collins   Position of ETT in xRay In good position   Bite Block none   Site Condition Cool;Dry   Status Intact;Secured;Patent   Site Assessment Clean;Dry;No bleeding   Airway Safety    Is Ambu Bag and Mask with Patient? Yes, Adult Ambu Bag and Mask   Suction set is at the bedside? Yes   Vent Select   Conventional Vent Y   $ Ventilator Subsequent 1   Charged w/in last 24h YES   Preset Conventional Ventilator Settings   Vent ID [id number]   Vent Type    Humidity HME   Patient Ventilator Parameters   Tubing ID (mm) 7.5 mm   Tube Type ET   Conventional Ventilator Alarms   Alarms On Y   Education   $ Education Bronchodilator;15 min   Respiratory Evaluation   $ Care Plan Tech Time 15 min   $ Eval/Re-eval Charges Re-evaluation

## 2023-11-06 NOTE — CARE UPDATE
11/05/23 1944   Patient Assessment/Suction   Level of Consciousness (AVPU) responds to pain   Respiratory Effort Unlabored   Expansion/Accessory Muscles/Retractions no use of accessory muscles   All Lung Fields Breath Sounds clear;diminished   Rhythm/Pattern, Respiratory assisted mechanically   Cough Frequency with stimulation   Cough Type assisted   Suction Method oral;tracheal   $ Suction Charges Inline Suction Procedure Stat Charge   Secretions Amount small   Secretions Color white   Secretions Characteristics thick   PRE-TX-O2   Device (Oxygen Therapy) ventilator   $ Is the patient on Low Flow Oxygen? Yes   Oxygen Concentration (%) 40   SpO2 100 %   Pulse Oximetry Type Continuous   $ Pulse Oximetry - Multiple Charge Pulse Oximetry - Multiple   Pulse 108   Resp (!) 28   BP (!) 85/54   Positioning   Head of Bed (HOB) Positioning HOB elevated;HOB at 30 degrees   Aerosol Therapy   $ Aerosol Therapy Charges Aerosol Treatment   Daily Review of Necessity (SVN) completed   Respiratory Treatment Status (SVN) given   Treatment Route (SVN) in-line   Patient Position (SVN) HOB elevated;semi-Solorzano's   Post Treatment Assessment (SVN) breath sounds improved   Signs of Intolerance (SVN) none   Breath Sounds Post-Respiratory Treatment   Throughout All Fields Post-Treatment All Fields   Throughout All Fields Post-Treatment aeration increased   Post-treatment Heart Rate (beats/min) 107   Post-treatment Resp Rate (breaths/min) 28        Airway - Non-Surgical 11/04/23 1100 Endotracheal Tube   Placement Date/Time: 11/04/23 1100   Present Prior to Hospital Arrival?: Yes  Inserted by: EMS  Airway Device: Endotracheal Tube  Intubated: (c) Other (see comments)  Airway Device Size: 7.5  Style: Cuffed  Cuff Inflation: Minimal occlusive pressure  ...   Secured at 22 cm   Measured At Lips   Secured Location Right   Secured by Commercial tube collins   Position of ETT in xRay In good position   Tube Securement Device Changed? Yes   Bite  Block none   Site Condition Cool;Dry   Status Intact;Secured   Site Assessment Clean;Dry   Vent Select   Conventional Vent Y   Charged w/in last 24h YES   Preset Conventional Ventilator Settings   Vent Type    Ventilation Type VC   Vent Mode A/C   Humidity HME   Set Rate 28 BPM   Vt Set 350 mL   PEEP/CPAP 5 cmH20   Peak Flow 60 L/min   Peak End Inspiratory Pressure 18 cmH20   I-Trigger Type  V-TRIG   Trigger Sensitivity Flow/I-Trigger 3 L/min   Patient Ventilator Parameters   Resp Rate Total 28 br/min   Peak Airway Pressure 29 cmH20   Mean Airway Pressure 12 cmH20   Plateau Pressure 0 cmH20   Exhaled Vt 336 mL   Total Ve 10.1 L/m   I:E Ratio Measured 1:2.40   Auto PEEP 0 cmH20   Tubing ID (mm) 7.5 mm   Tube Type ET   Conventional Ventilator Alarms   Alarms On Y   Resp Rate High Alarm 50 br/min   Press High Alarm 40 cmH2O   Apnea Rate 10   Apnea Volume (mL) 0 mL   Apnea Oxygen Concentration  100   Apnea Flow Rate (L/min) 44   T Apnea 20 sec(s)   Ready to Wean/Extubation Screen   FIO2<=50 (chart decimal) 0.4   MV<16L (chart vol.) 10.1   PEEP <=8 (chart #) 5   Ready to Wean Parameters   F/VT Ratio<105 (RSBI) (!) 83.33   Vital Capacity   Vital Capacity (mL) 0   Respiratory Evaluation   $ Care Plan Tech Time 15 min   $ Eval/Re-eval Charges Re-evaluation

## 2023-11-06 NOTE — CARE UPDATE
11/05/23 1944   Patient Assessment/Suction   Level of Consciousness (AVPU) responds to pain   Respiratory Effort Unlabored   Expansion/Accessory Muscles/Retractions no use of accessory muscles   All Lung Fields Breath Sounds clear;diminished   Rhythm/Pattern, Respiratory assisted mechanically   Cough Frequency with stimulation   Cough Type assisted   Suction Method oral;tracheal   $ Suction Charges Inline Suction Procedure Stat Charge   Secretions Amount small   Secretions Color white   Secretions Characteristics thick   PRE-TX-O2   Device (Oxygen Therapy) ventilator   $ Is the patient on Low Flow Oxygen? Yes   Oxygen Concentration (%) 40   SpO2 100 %   Pulse Oximetry Type Continuous   $ Pulse Oximetry - Multiple Charge Pulse Oximetry - Multiple   Pulse 108   Resp (!) 28   BP (!) 85/54   Positioning   Head of Bed (HOB) Positioning HOB elevated;HOB at 30 degrees   Aerosol Therapy   $ Aerosol Therapy Charges Aerosol Treatment   Daily Review of Necessity (SVN) completed   Respiratory Treatment Status (SVN) given   Treatment Route (SVN) in-line   Patient Position (SVN) HOB elevated;semi-Solorzano's   Post Treatment Assessment (SVN) breath sounds improved   Signs of Intolerance (SVN) none   Breath Sounds Post-Respiratory Treatment   Throughout All Fields Post-Treatment All Fields   Throughout All Fields Post-Treatment aeration increased   Post-treatment Heart Rate (beats/min) 107   Post-treatment Resp Rate (breaths/min) 28   Vent Select   Conventional Vent Y   Charged w/in last 24h YES   Preset Conventional Ventilator Settings   Vent Type    Ventilation Type VC   Vent Mode A/C   Humidity HME   Set Rate 28 BPM   Vt Set 350 mL   PEEP/CPAP 5 cmH20   Peak Flow 60 L/min   Peak End Inspiratory Pressure 18 cmH20   I-Trigger Type  V-TRIG   Trigger Sensitivity Flow/I-Trigger 3 L/min   Patient Ventilator Parameters   Resp Rate Total 28 br/min   Peak Airway Pressure 29 cmH20   Mean Airway Pressure 12 cmH20   Plateau Pressure  0 cmH20   Exhaled Vt 336 mL   Total Ve 10.1 L/m   I:E Ratio Measured 1:2.40   Auto PEEP 0 cmH20   Tubing ID (mm) 7.5 mm   Tube Type ET   Conventional Ventilator Alarms   Alarms On Y   Resp Rate High Alarm 50 br/min   Press High Alarm 40 cmH2O   Apnea Rate 10   Apnea Volume (mL) 0 mL   Apnea Oxygen Concentration  100   Apnea Flow Rate (L/min) 44   T Apnea 20 sec(s)   Ready to Wean/Extubation Screen   FIO2<=50 (chart decimal) 0.4   MV<16L (chart vol.) 10.1   PEEP <=8 (chart #) 5   Ready to Wean Parameters   F/VT Ratio<105 (RSBI) (!) 83.33   Vital Capacity   Vital Capacity (mL) 0   Respiratory Evaluation   $ Care Plan Tech Time 15 min   $ Eval/Re-eval Charges Re-evaluation

## 2023-11-06 NOTE — ASSESSMENT & PLAN NOTE
Patient is identified as having Diastolic (HFpEF) heart failure that is Acute on chronic. CHF is currently controlled. Latest ECHO performed and demonstrates- Results for orders placed during the hospital encounter of 08/25/23    Echo Saline Bubble? No    Interpretation Summary    Left Ventricle: The left ventricle is normal in size. Moderately increased ventricular mass. Moderately increased wall thickness. There is moderate concentrict hypertrophy. Normal wall motion. There is normal systolic function.  EF 61% There is normal diastolic function.    Left Atrium: Left atrium is moderately dilated.    Right Ventricle: Normal right ventricular cavity size. Wall thickness is normal. Right ventricle wall motion  is normal. Systolic function is normal.    Mitral Valve: Mildly thickened anterior leaflet. Mild mitral annular calcification.    IVC/SVC: Normal venous pressure at 3 mmHg.    Pericardium: There is an effusion.    Limited echo; no gross pulmonary hypertension but poor visualization of tricuspid signal    No tissue Doppler performed to assess mean left atrial pressure    We will continue with IV diuretics.  Chest x-ray shows improvement.  Discontinue dobutamine.  If pressors needed we will start Levophed.  Strict I's and O's.  Wean Lasix to daily from b.i.d..  We will discontinue tomorrow if creatinine continues to rise.    Recent Labs   Lab 11/04/23  1057   BNP 1,995*   .

## 2023-11-06 NOTE — ASSESSMENT & PLAN NOTE
No reported history of atrial fibrillation, EKG showed atrial fibrillation with rapid ventricular response  Cardiology notified, patient started amiodarone drip  Patient on full-dose Lovenox.

## 2023-11-06 NOTE — PROGRESS NOTES
Progress Note  PULMONARY    Admit Date: 11/4/2023 11/06/2023  History of Present Illness:  Pt is a 79 yo female with end stage COPD, chronic resp failure on 8L home O2 who is on hospice. She called EMS and was in distress then became unresponsive. She was found to be hypercapneic intubated in ED before her hospice status was known. Her daughter is at bedside and wishes to continue vent support for now hoping she may improve.  Pt has been at home on hospice but daughter states she wants her to go to NH as she hasn't been doing well at home.    SUBJECTIVE:     11/5- continues on vent, with SAT/SBT pt wakes and follows commands but tachypneic, tachycardic and hypertensive so failed SBT. Daughter at bedside actively participating. Pt hard of hearing and has her hearing aid R ear    11/6 patient lasted 15 minutes on her sedation vacation and spontaneous breathing trial this morning.  I do not have an ABG.  That is being corrected.  The patient was on hospice.  I have spoken with her daughter who states EMS was called 1st, not hospice.  She was intubated before hospice was ever notified.  The daughter would like to give her 4-7 days on the ventilator and see if she can improve.  I told her that I am not hopeful of this.  The patient was on 8 L of oxygen at home.      OBJECTIVE:     Vitals (Most recent):  Vitals:    11/06/23 0854   BP:    Pulse: 76   Resp: (!) 28   Temp:        Vitals (24 hour range):  Temp:  [96.8 °F (36 °C)-98 °F (36.7 °C)]   Pulse:  []   Resp:  [28-42]   BP: ()/()   SpO2:  [94 %-100 %]       Intake/Output Summary (Last 24 hours) at 11/6/2023 0931  Last data filed at 11/6/2023 0554  Gross per 24 hour   Intake 374.54 ml   Output 1850 ml   Net -1475.46 ml          PHYSICAL EXAM  GENERAL: Older patient in no distress, sedated on the ventilator.  HEENT: Pupils equal and reactive. Extraocular movements intact. Nose intact. Pharynx intubated with 7.5 ET tube and OG tube.  NECK: Supple.    HEART: Regular rate and rhythm. No murmur or gallop auscultated.  LUNGS:  There are crackles and consolidation in both bases, right greater than left.. Lung excursion symmetrical. No change in fremitus.   ABDOMEN: Bowel sounds present. Non-tender, no masses palpated.  : Normal anatomy.  Montes with yellow urine  EXTREMITIES: Normal muscle tone and joint movement, no cyanosis or clubbing.  There are bilateral heel decubiti forming.  There is an eschar over the 3rd toe on the right foot.  LYMPHATICS: No adenopathy palpated, 1+ edema.  SKIN: Dry multiple abrasions and eschars  NEURO:  Sedated  PSYCH:  Unable to assess      Radiographs reviewed: view by direct vision     Imaging Results              X-Ray Knee 1 or 2 View Right (Final result)  Result time 11/04/23 11:55:53      Final result by García Russell MD (11/04/23 11:55:53)                   Narrative:    CLINICAL HISTORY:  78 years (1945) Female (Found unresponsive in wheelchair at home; )    TECHNIQUE:  XR KNEE 1-2 VIEWS RIGHT. 2 view(s) obtained .    COMPARISON:  None available.    FINDINGS:  There is diffusely decreased osseous mineralization (suggesting osteoporosis/osteopenia) which limits evaluation for subtle fractures, that being said no acute displaced fracture or dislocation is seen. The joints and interspaces are maintained. There is a trace knee joint effusion in the suprapatellar recess. Faint calcification along the medial and lateral joint line suggesting chondrocalcinosis/CPPD. As the calcifications are seen in the arteries of the popliteal fossa. Faint popcorn-like calcification is seen in the medial metadiaphysis of the distal femur suggesting an enchondroma or bone infarct. Soft tissues are radiographically within normal limits and no radiopaque foreign body is seen.    IMPRESSION:  No acute osseous abnormality.                  .    Electronically signed by:  García Russell MD  11/04/2023 11:55 AM CDT Workstation: [id number]                                      X-Ray Chest AP Portable (Final result)  Result time 11/04/23 11:12:13      Final result by García Russell MD (11/04/23 11:12:13)                   Narrative:    CLINICAL HISTORY:  78 years (1945) Female Found unresponsive in wheelchair at home; Hx of cad, chf, copd, colon cancer    TECHNIQUE:  Portable AP radiograph the chest. One view.    COMPARISON:  Radiograph from August 26, 2023.    FINDINGS:  Mild diffuse interstitial opacity throughout both lungs with a slight basilar predominance. There is blunting of both costophrenic angles consistent with small right and trace left pleural effusions and adjacent atelectasis. No pneumothorax is identified. The heart is mildly enlarged.  Osseous structures show degenerative changes in the spine. The visualized upper abdomen is unremarkable.    Lines and tubes: Endotracheal tube with tip projecting approximately 6 and meter from the juan j.    IMPRESSION:  Cardiomegaly and findings of mild interstitial pulmonary edema in the mid-lower lung zones.                  .            Electronically signed by:  García Russell MD  11/04/2023 11:12 AM CDT Workstation: [id number]                                  TTE 8/26/23-     Left Ventricle: The left ventricle is normal in size. Moderately increased ventricular mass. Moderately increased wall thickness. There is moderate concentrict hypertrophy. Normal wall motion. There is normal systolic function.  EF 61% There is normal diastolic function.    Left Atrium: Left atrium is moderately dilated.    Right Ventricle: Normal right ventricular cavity size. Wall thickness is normal. Right ventricle wall motion  is normal. Systolic function is normal.    Mitral Valve: Mildly thickened anterior leaflet. Mild mitral annular calcification.    IVC/SVC: Normal venous pressure at 3 mmHg.    Pericardium: There is an effusion.    Limited echo; no gross pulmonary hypertension but poor visualization of  tricuspid signal    No tissue Doppler performed to assess mean left atrial pressure    Labs     Recent Labs   Lab 11/06/23  0401 11/06/23 0526   WBC 11.56  --    HGB 8.8*  --    HCT 29.8* 26*     --      Recent Labs   Lab 11/06/23  0401     142   K 4.7  4.7   CL 97  97   CO2 38*  38*   BUN 45*  45*   CREATININE 1.9*  1.9*   *  155*   CALCIUM 8.4*  8.4*   MG 2.2   PHOS 4.9*   AST 15   ALT 8*   ALKPHOS 59   BILITOT 0.3   PROT 5.7*   ALBUMIN 3.0*     Recent Labs   Lab 11/06/23 0526   PH 7.481*   PCO2 53.9*   PO2 33*   HCO3 40.2*     Microbiology Results (last 7 days)       Procedure Component Value Units Date/Time    Urine culture [[id number]] Collected: 11/04/23 1134    Order Status: Completed Specimen: Urine from Clean Catch Updated: 11/06/23 0728     Urine Culture, Routine No growth to date    Culture, Respiratory with Gram Stain [[id number]]  (Abnormal)  (Susceptibility) Collected: 11/04/23 1122    Order Status: Completed Specimen: Respiratory from Sputum Updated: 11/06/23 0706     Respiratory Culture KLEBSIELLA PNEUMONIAE  Few       Gram Stain (Respiratory) <10 epithelial cells per low power field.     Gram Stain (Respiratory) Few WBC's     Gram Stain (Respiratory) Few Gram negative rods    Blood Culture #2 **CANNOT BE ORDERED STAT** [[id number]] Collected: 11/04/23 1552    Order Status: Completed Specimen: Blood Updated: 11/05/23 1632     Blood Culture, Routine No Growth to date      No Growth to date    Narrative:      Collection has been rescheduled by [facility] at 11/04/2023 12:28 Reason:   Unable to collect 2nd set  Collection has been rescheduled by [facility] at 11/04/2023 12:28 Reason:   Unable to collect 2nd set    Blood Culture #1 **CANNOT BE ORDERED STAT** [[id number]] Collected: 11/04/23 1204    Order Status: Completed Specimen: Blood Updated: 11/05/23 1432     Blood Culture, Routine No Growth to date      No Growth to date    MRSA Screen by PCR [[id number]]  (Abnormal) Collected:  11/04/23 1609    Order Status: Completed Specimen: Nasopharyngeal Swab from Nasal Updated: 11/04/23 1736     MRSA SCREEN BY PCR Positive     Comment: Positive MRSA by PCR called and verbal readback obtained from Ines Alonzo, ICU, RN. by T1 11/04/2023 17:36         Narrative:      Positive MRSA by PCR called and verbal readback obtained from Ines Alonzo ICU, RN. by SLT1 11/04/2023 17:36    Urine Culture High Risk [[id number]]     Order Status: Completed Specimen: Urine             Impression:  Active Hospital Problems    Diagnosis  POA    *Acute respiratory failure with hypoxia and hypercarbia [J96.01, J96.02]  Yes    Suspected Pneumonia [J18.9]  Yes    COPD exacerbation [J44.1]  Yes    Acute on chronic heart failure with preserved ejection fraction (HFpEF) [I50.33]  Yes    Hyperkalemia [E87.5]  Yes    Leukocytosis [D72.829]  Yes    Chronic kidney disease, stage 4 (severe) [N18.4]  Yes    DM type 2, controlled, with complication [E11.8]  Yes    Essential hypertension, benign [I10]  Yes      Resolved Hospital Problems   No resolved problems to display.           Assessment/Plan:     Acute on chronic hypercapneic/hypoxemic respiratory failure with mechanical ventilation  - on 8 L of oxygen at home  - failing SBT each morning  - adequate oxygenation on 35%  - alkalosis, will decrease rate  Acute on chronic diastolic CHF  Bilateral pleural effusions  - on Lasix IV with rising creatinine  COPD with acute exacerbation  - no wheezing auscultated  - move DuoNebs to q.6  - continue Solu-Medrol  Right lower lobe pneumonia  - Klebsiella pneumoniae  - change cefepime to Rocephin  Acute on chronic renal failure  - creatinine rising with diuresis  Atrial fibrillation  - on amiodarone  Coronary artery disease  - recent stents  Anemia  - chronic disease  Hyperglycemia and diabetes mellitus  Troponin leak  Mild hypoalbuminemia  Decubiti to heels  - offload pressure to heels      Discussed the situation with her daughter.  Her  daughter knows that her likelihood of coming off the ventilator is low, but would like to give her a chance.  She does not wish for CPR or other aggressive interventions.  But, she would like to continue mechanical ventilation for 4-7 days to give her a chance.    On full-dose enoxaparin for possible PE   Protonix for GI prophylaxis  Requesting arterial line    Critical care time spent reviewing the chart, examining the patient, reviewing the labs, reviewing the radiological findings, discussing care with nursing, physicians, and respiratory and creating the note and  has been greater than 35 minutes.

## 2023-11-06 NOTE — CONSULTS
"[facility]  Adult Nutrition   Consult Note (Nutrition Support Management)    SUMMARY     Recommendations  Recommendation/Intervention: Per consult for tube feeds: Pulmocare @ goal rate of 50 mL/hr to meet protein needs. 2. RD to monitor for NS tolerance, labs, and status change PRN.  Goals: Patient to receive nutrition within 24-48 hours.  Nutrition Goal Status: new    Dietitian Rounds Brief  Consult for NG feeds. 78 yr old admit for acute on chronic respiratory failure with hypoxia. History of COPD; DM 2, hospice care (revoked by daughter). Patient sedated and intubated. Recommendations / order for NG feeds as above. RD to follow PRN.    Malnutrition Assessment   No overt signs noted. BMI 26.91.         Diet order:   Current Diet Order: NPO           Evaluation of Received Nutrient/Fluid Intake  Energy Calories Required: not meeting needs  Protein Required: not meeting needs  Fluid Required: not meeting needs  Tolerance: tolerating     % Intake of Estimated Energy Needs: 0%  % Meal Intake: NPO      Intake/Output Summary (Last 24 hours) at 11/6/2023 1211  Last data filed at 11/6/2023 0554  Gross per 24 hour   Intake 370.49 ml   Output 1580 ml   Net -1209.51 ml        Anthropometrics  Temp: 96.8 °F (36 °C)  Height Method: Estimated  Height: 5' 3" (160 cm)  Height (inches): 63 in  Weight Method: Bed Scale  Weight: 68.9 kg (151 lb 14.4 oz)  Weight (lb): 151.9 lb  Ideal Body Weight (IBW), Female: 115 lb  % Ideal Body Weight, Female (lb): 143.59 %  BMI (Calculated): 26.9  BMI Grade: 25 - 29.9 - overweight       Estimated/Assessed Needs  Weight Used For Calorie Calculations: 68.9 kg (151 lb 14.4 oz)  Energy Calorie Requirements (kcal): 1722 - 2067 / day (25-30 kcal/kg)  Energy Need Method: Kcal/kg  Protein Requirements: 55-82 gm/day (0.8-1.2 gm/kg)  Weight Used For Protein Calculations: 68.9 kg (151 lb 14.4 oz)     Estimated Fluid Requirement Method: RDA Method  RDA Method (mL): 1722       Reason for " Assessment  Reason For Assessment: consult, new tube feeding  Diagnosis: pulmonary disease  Relevant Medical History: HFpEF, COPD,   CAD, DM2, HTN,    Nutrition/Diet History  Food Allergies: other (see comments) (Strawberries)    Nutrition Risk Screen  Nutrition Risk Screen: other (see comments), unintentional loss of 10 lbs or more in the past 2 months (multiple wounds)       Altered Skin Integrity 11/04/23 1520 Left anterior;lower Leg #1 -Wound Image: Images linked       Altered Skin Integrity 11/04/23 1520 Right anterior;lower Leg #2 -Wound Image: Images linked       Altered Skin Integrity 11/04/23 1520 Left Heel #3 Intact skin with non-blanchable redness of localized area-Wound Image: Images linked       Altered Skin Integrity 11/04/23 1520 Coccyx #4 Full thickness tissue loss. Subcutaneous fat may be visible but bone, tendon or muscle are not exposed-Wound Image: Images linked       Altered Skin Integrity 11/04/23 Right anterior;lower Arm #5-Wound Image: Images linked       Altered Skin Integrity 11/04/23 1520 Left anterior;lower Arm #6 -Wound Image: Images linked  MST Score: 2  Have you recently lost weight without trying?: Unsure  Weight loss score: 2  Have you been eating poorly because of a decreased appetite?: No  Appetite score: 0       Weight History:  Wt Readings from Last 5 Encounters:   11/06/23 68.9 kg (151 lb 14.4 oz)   09/05/23 74.6 kg (164 lb 7.4 oz)   08/28/23 77.2 kg (170 lb 3.1 oz)   07/27/23 66.5 kg (146 lb 11.2 oz)   07/27/23 68 kg (150 lb)        Lab/Procedures/Meds: Pertinent Labs/Meds Reviewed    Medications:Pertinent Medications Reviewed  Scheduled Meds:   albuterol-ipratropium  3 mL Nebulization Q6H    allopurinoL  50 mg Oral Daily    aspirin  81 mg Oral Daily    atorvastatin  40 mg Oral QHS    cefTRIAXone (ROCEPHIN) IVPB  1 g Intravenous Q24H    clopidogreL  75 mg Oral Daily    enoxparin  1 mg/kg Subcutaneous Q24H (treatment, non-standard time)    furosemide (LASIX) injection  40 mg  Intravenous Daily    methylPREDNISolone sodium succinate injection  60 mg Intravenous Daily    mupirocin   Nasal BID    pantoprazole  40 mg Intravenous Daily    potassium, sodium phosphates  1 packet Oral Once     Continuous Infusions:   amiodarone in dextrose 5% 0.5 mg/min (11/06/23 0907)    dexmedeTOMIDine (Precedex) infusion (titrating) 1.4 mcg/kg/hr (11/06/23 0902)    fentanyl Stopped (11/06/23 0300)     PRN Meds:.acetaminophen, albuterol-ipratropium, calcium gluconate IVPB, calcium gluconate IVPB, calcium gluconate IVPB, dextrose 50%, dextrose 50%, glucagon (human recombinant), glucose, glucose, HYDROcodone-acetaminophen, insulin aspart U-100, lorazepam, magnesium sulfate IVPB, magnesium sulfate IVPB, melatonin, metoprolol, morphine, naloxone, ondansetron, polyethylene glycol, potassium chloride **AND** potassium chloride **AND** potassium chloride, senna-docusate 8.6-50 mg, simethicone, sodium chloride 0.9%, sodium phosphate 15 mmol in dextrose 5 % (D5W) 250 mL IVPB, sodium phosphate 20.01 mmol in dextrose 5 % (D5W) 250 mL IVPB, sodium phosphate 30 mmol in dextrose 5 % (D5W) 250 mL IVPB    Labs: Pertinent Labs Reviewed  Clinical Chemistry:  Recent Labs   Lab 11/04/23  1057 11/04/23  1553 11/05/23  0505 11/05/23  0812 11/05/23  2004 11/06/23  0012 11/06/23  0401      < > 144  144   < > 142   < > 142  142   K 6.2*   < > 5.2*  5.2*   < > 5.0   < > 4.7  4.7      < > 101  101   < > 98   < > 97  97   CO2 36*   < > 35*  35*   < > 35*   < > 38*  38*   *   < > 106  106   < > 171*   < > 155*  155*   BUN 38*   < > 43*  43*   < > 45*   < > 45*  45*   CREATININE 1.6*   < > 1.6*  1.6*   < > 1.6*   < > 1.9*  1.9*   CALCIUM 9.0   < > 8.8  8.8   < > 8.4*   < > 8.4*  8.4*   PROT 6.6  --  5.5*  --   --   --  5.7*   ALBUMIN 3.6  --  2.9*  --   --   --  3.0*   BILITOT 0.2  --  0.3  --   --   --  0.3   ALKPHOS 85  --  57  --   --   --  59   AST 14  --  15  --   --   --  15   ALT 11  --  9*  --    "--   --  8*   ANIONGAP 0*   < > 8  8   < > 9   < > 7*  7*   MG  --    < > 2.3  --   --   --  2.2   PHOS  --   --  2.5*  --  4.2  --  4.9*    < > = values in this interval not displayed.     CBC:   Recent Labs   Lab 11/06/23  0401 11/06/23  0526 11/06/23  0930   WBC 11.56  --   --    RBC 3.30*  --   --    HGB 8.8*  --   --    HCT 29.8*   < > 27*     --   --    MCV 90  --   --    MCH 26.7*  --   --    MCHC 29.5*  --   --     < > = values in this interval not displayed.     Lipid Panel:  No results for input(s): "CHOL", "HDL", "LDLCALC", "TRIG", "CHOLHDL" in the last 168 hours.  Cardiac Profile:  Recent Labs   Lab 11/04/23  1057   BNP 1,995*     Inflammatory Labs:  No results for input(s): "CRP" in the last 168 hours.  Diabetes:  No results for input(s): "HGBA1C", "POCTGLUCOSE" in the last 168 hours.  Thyroid & Parathyroid:  No results for input(s): "TSH", "FREET4", "Y5QZPCN", "Y5XGTCA", "THYROIDAB" in the last 168 hours.    Monitor and Evaluation  Food and Nutrient Intake: energy intake, food and beverage intake  Food and Nutrient Adminstration: diet order  Physical Activity and Function: breastfeeding, nutrition-related ADLs and IADLs  Anthropometric Measurements: weight, weight change, body mass index  Biochemical Data, Medical Tests and Procedures: electrolyte and renal panel, glucose/endocrine profile, gastrointestinal profile, inflammatory profile  Nutrition-Focused Physical Findings: overall appearance     Nutrition Risk  Level of Risk/Frequency of Follow-up: high     Nutrition Follow-Up         Chanell Estrada RD, LDN 11/06/2023 12:11 PM     "

## 2023-11-06 NOTE — PLAN OF CARE
[facility]  Initial Discharge Assessment       Primary Care Provider: Khadar Dwyer MD    Admission Diagnosis: Acute hypoxemic respiratory failure [J96.01]    Admission Date: 11/4/2023  Expected Discharge Date:     Transition of Care Barriers: None    Initial assessment completed with patient's daughter, Marisol, via telephone at [phone], as patient is unresponsive. Daughter states she has dual power of , and patient has an advance directive. Patient was reportedly dependent on equipment for all ADLs and used the following DME at home: rolling walker, rollator, quad cane, wheelchair, shower chair, tub bench, grab bars, and 3-in-1 commode. Daughter states of this list of DME, she varied which ones she used depending on daily needs. Patient has a CPAP machine but has been unable to use it due to having received a mask she can't tolerate. Daughter reports patient is on hospice with [facility] and they provide oxygen. She is not on dialysis or coumadin, and does not have any medication affordability/compliance issues. Daughter would like for patient to discharge to a nursing home, alf care, due to health status and inability to care for self. Patient was living alone prior to hospitalization with strong family support. Other discharge plans TBD. CM will continue to follow for updates and update discharge plans accordingly.     Payor: MEDICARE / Plan: MEDICARE PART A & B / Product Type: Government /     Extended Emergency Contact Information  Primary Emergency Contact: Marisol Kerr  Address: [address] [place] [address] [place]  Mobile Phone: [phone]  Relation: Daughter  Preferred language: English   needed? No    Discharge Plan A: New Nursing Home placement - alf care facility  Discharge Plan B: Other (TBD)      [facility] [address] [place] [address] [facility]  [address]  [address]  Phone: [phone] Fax: [phone]    [facility] - [place], [place] - [address] [address]  Phone: [phone] Fax: [phone]      Initial Assessment (most recent)       Adult Discharge Assessment - 11/05/23 1753          Discharge Assessment    Assessment Type Discharge Planning Assessment     Confirmed/corrected address, phone number and insurance Yes     Confirmed Demographics Correct on Facesheet     Source of Information family     When was your last doctors appointment? --   Unknown date August 2023    Communicated LUZ with patient/caregiver Date not available/Unable to determine     Reason For Admission Acute respiratory failure with hypoxia and hypercarbia     People in Home alone     Facility Arrived From: Home     Do you expect to return to your current living situation? No     Do you have help at home or someone to help you manage your care at home? Yes     Who are your caregiver(s) and their phone number(s)? Marisol (daughter) - [phone]     Prior to hospitilization cognitive status: No Deficits;Alert/Oriented     Current cognitive status: Coma/Sedated/Intubated     Walking or Climbing Stairs ambulation difficulty, requires equipment;transferring difficulty, requires equipment     Mobility Management Uses various DME depending on daily need.     Dressing/Bathing bathing difficulty, requires equipment;dressing difficulty, requires equipment     Dressing/Bathing Management Uses various DME depending on daily need.     Do you have any problems with: --   Family assists with errands.    Equipment Currently Used at Home walker, rolling;rollator;wheelchair;cane, quad;oxygen;shower chair;grab bar;bath bench;3-in-1 commode     Readmission within 30 days? No     Do you currently have service(s) that help you manage your care at home? Yes     Name and Contact number of agency [facility]     Is the pt/caregiver preference to resume services with current  agency No     Do you take prescription medications? Yes     Do you have prescription coverage? Yes     Coverage Medicare Part A&B/Medicaid     Do you have any problems affording any of your prescribed medications? No     Is the patient taking medications as prescribed? yes     Who is going to help you get home at discharge? Unknown     How do you get to doctors appointments? family or friend will provide     Are you on dialysis? No     Do you take coumadin? No     DME Needed Upon Discharge  other (see comments)   TBD    Discharge Plan discussed with: Patient     Transition of Care Barriers None     Discharge Plan A New Nursing Home placement - senior care care facility     Discharge Plan B Other   TBD

## 2023-11-06 NOTE — ASSESSMENT & PLAN NOTE
Patient's COPD is with exacerbation noted by continued dyspnea, use of accessory muscles for breathing and worsening of baseline hypoxia currently.  Patient is currently on COPD Pathway. Continue scheduled inhalers Steroids, Antibiotics and Supplemental oxygen and monitor respiratory status closely.     Patient with severe end-stage COPD, currently on home hospice.  On 8 L oxygen at home.  Discussions held with daughter about overall prognosis and difficulty weaning ventilation, daughter verbalized understanding.  We will continue with SBT trials daily.  Patient has been unsuccessful with SBT trials.  Discussions held with pulmonology and daughter, likelihood of coming off the ventilator as low.  Patient code status DNR.

## 2023-11-06 NOTE — PLAN OF CARE
sent correction placement referral via careport per daughter request.    11/06/23 1055   Post-Acute Status   Post-Acute Authorization Placement   Post-Acute Placement Status Referrals Sent   Coverage MEDICARE - MEDICARE PART A & B   Discharge Plan   Discharge Plan A New Nursing Home placement - correction care facility   Discharge Plan B New Nursing Home placement - correction care facility

## 2023-11-06 NOTE — ANESTHESIA PROCEDURE NOTES
Arterial    Diagnosis: COPD respiratory failure    Patient location during procedure: ICU  Procedure start time: 11/6/2023 2:10 PM  Timeout: 11/6/2023 2:10 PM  Procedure end time: 11/6/2023 2:20 PM    Staffing  Authorizing Provider: Cayden Carranza Jr., MD  Performing Provider: Cayden Carranza Jr., MD    Staffing  Performed by: Cayden Carranza Jr., MD  Authorized by: Cayden Carranza Jr., MD    Anesthesiologist was present at the time of the procedure.    Preanesthetic Checklist  Completed: patient identified, IV checked, site marked, risks and benefits discussed, surgical consent, monitors and equipment checked, pre-op evaluation, timeout performed and anesthesia consent givenArterial  Skin Prep: chlorhexidine gluconate  Local Infiltration: lidocaine  Orientation: left  Location: radial    Catheter Size: 20 G  Catheter placement by Ultrasound guidance. Heme positive aspiration all ports.   Vessel Caliber: small, patent, compressibility normal  Needle advanced into vessel with real time Ultrasound guidance.  Guidewire confirmed in vessel.  Sterile sheath used.Insertion Attempts: 2  Assessment  Dressing: secured with tape and tegaderm and sutured in place and taped  Patient: Tolerated well

## 2023-11-06 NOTE — PROGRESS NOTES
Marisol Kerr [id number] is a 78 y.o. female who has been consulted for vancomycin dosing.    Pharmacy consult for vancomycin dosing in no longer required.  Vancomycin was discontinued.    Thank you for allowing us to participate in this patient's care.     Eliane Pickard, Pharm.D

## 2023-11-06 NOTE — SUBJECTIVE & OBJECTIVE
Interval History:  Patient remains intubated sedated, failed SBT this morning.  Creatinine rising.  We will wean Lasix.  Patient started on amiodarone drip per Cardiology after tele showed atrial fibrillation.    Review of Systems   Unable to perform ROS: Intubated     Objective:     Vital Signs (Most Recent):  Temp: 96.8 °F (36 °C) (11/06/23 0800)  Pulse: 70 (11/06/23 1210)  Resp: 18 (11/06/23 1210)  BP: (!) 114/54 (11/06/23 1210)  SpO2: 100 % (11/06/23 1210) Vital Signs (24h Range):  Temp:  [96.8 °F (36 °C)-97.9 °F (36.6 °C)] 96.8 °F (36 °C)  Pulse:  [] 70  Resp:  [18-40] 18  SpO2:  [98 %-100 %] 100 %  BP: ()/(51-79) 114/54     Weight: 68.9 kg (151 lb 14.4 oz)  Body mass index is 26.91 kg/m².    Intake/Output Summary (Last 24 hours) at 11/6/2023 1228  Last data filed at 11/6/2023 0554  Gross per 24 hour   Intake 370.49 ml   Output 1580 ml   Net -1209.51 ml         Physical Exam  Vitals reviewed.   Constitutional:       Appearance: She is obese. She is ill-appearing.   HENT:      Head: Normocephalic and atraumatic.      Mouth/Throat:      Comments: Intubated  Eyes:      Extraocular Movements: Extraocular movements intact.      Pupils: Pupils are equal, round, and reactive to light.   Cardiovascular:      Rate and Rhythm: Regular rhythm. Tachycardia present.   Pulmonary:      Breath sounds: No wheezing.      Comments: Mechanical breath sounds  Abdominal:      General: Abdomen is flat.      Palpations: Abdomen is soft.   Neurological:      Comments: Sedated             Significant Labs: All pertinent labs within the past 24 hours have been reviewed.  CBC:   Recent Labs   Lab 11/04/23  2230 11/05/23  0505 11/06/23  0401 11/06/23  0526 11/06/23  0930   WBC 10.21 15.68* 11.56  --   --    HGB 7.8* 8.6* 8.8*  --   --    HCT 27.4* 29.8* 29.8* 26* 27*    180 185  --   --      CMP:   Recent Labs   Lab 11/05/23  0505 11/05/23  0812 11/05/23 2004 11/06/23  0012 11/06/23  0401     144   < > 142 140  142  142   K 5.2*  5.2*   < > 5.0 4.8 4.7  4.7     101   < > 98 97 97  97   CO2 35*  35*   < > 35* 38* 38*  38*     106   < > 171* 181* 155*  155*   BUN 43*  43*   < > 45* 46* 45*  45*   CREATININE 1.6*  1.6*   < > 1.6* 1.8* 1.9*  1.9*   CALCIUM 8.8  8.8   < > 8.4* 8.6* 8.4*  8.4*   PROT 5.5*  --   --   --  5.7*   ALBUMIN 2.9*  --   --   --  3.0*   BILITOT 0.3  --   --   --  0.3   ALKPHOS 57  --   --   --  59   AST 15  --   --   --  15   ALT 9*  --   --   --  8*   ANIONGAP 8  8   < > 9 5* 7*  7*    < > = values in this interval not displayed.       Significant Imaging: I have reviewed all pertinent imaging results/findings within the past 24 hours.

## 2023-11-06 NOTE — NURSING
The patient woke up while bathing her, became tachycardiac and tachypnea, and sedation was increased. The patient did not respond to sedation, and morphine was given. The patient did not respond to the morphine or increase in sedation. The patient's fentanyl was turned off, and she began to calm down. Ativan was ordered by Dr. Glover. The patient responded well to the Ativan, and is now resting. Heart rate has returned to baseline.

## 2023-11-06 NOTE — ASSESSMENT & PLAN NOTE
Likely secondary to aspiration pneumonia, patient also on steroids which can elevate white blood cells.  , reviewed CBC is white blood cells improving

## 2023-11-06 NOTE — NURSING
Patient heart rhythm is A-Fib on the monitor. EKG obtained. Dr. Varghese, cardiologist, notified and new orders placed

## 2023-11-06 NOTE — ASSESSMENT & PLAN NOTE
Patient with Hypercapnic Respiratory failure which is Acute on chronic.  she is on home oxygen at 8 LPM. Supplemental oxygen was provided and noted- Vent Mode: A/C  Oxygen Concentration (%):  [35-40] 35  Resp Rate Total:  [18 br/min-35 br/min] 18 br/min  Vt Set:  [350 mL] 350 mL  PEEP/CPAP:  [5 cmH20] 5 cmH20  Mean Airway Pressure:  [9.2 xuG26-42 cmH20] 9.6 cmH20    .   Signs/symptoms of respiratory failure include- increased work of breathing and respiratory distress. Contributing diagnoses includes - Aspiration and COPD Labs and images were reviewed. Patient Has recent ABG, which has been reviewed. Will treat underlying causes and adjust management of respiratory failure as follows- steroids, continue IV antibiotics, pulmonology consulted.  Daily weaning of mechanical ventilation.  Continue with inhalers.

## 2023-11-06 NOTE — ASSESSMENT & PLAN NOTE
Sputum cultures grew Klebsiella pneumoniae   discontinue vancomycin and cefepime, Rocephin started by pulmonology

## 2023-11-06 NOTE — PROCEDURES
Indication: acute hypoxic respiratory failure    The patient was placed on a cardiac  monitor including continuous pulse oximetry. The patient was pre-oxygenated to maintain the highest possible saturation pre-intubation.    Rapid Sequence Intubation was conducted. The patient received 10 mg of propofol for induction. Cricoid pressure was maintained from the time induction agent was given to time of cuff balloon inflation. Using direct laryngoscopy with a MAC 4 blade and a size 7.5 endotracheal tube with stylet, the patient was intubated on the 1st attempt. The stylet was removed and cuff balloon was  inflated. Appropriate endotracheal tube position was confirmed by  direct visualization of vocal cord passage, fogging of the tube, CO2 colormetric indicator and symmetric breath sounds. The tube was secured  at 23 cm at the gumline. Post intubation chest x-ray confirms appropriate placement of the ET tube.

## 2023-11-07 LAB
ALBUMIN SERPL BCP-MCNC: 3 G/DL (ref 3.5–5.2)
ALLENS TEST: ABNORMAL
ALP SERPL-CCNC: 67 U/L (ref 55–135)
ALT SERPL W/O P-5'-P-CCNC: 8 U/L (ref 10–44)
ANION GAP SERPL CALC-SCNC: 8 MMOL/L (ref 8–16)
AST SERPL-CCNC: 14 U/L (ref 10–40)
BASOPHILS # BLD AUTO: 0.03 K/UL (ref 0–0.2)
BASOPHILS NFR BLD: 0.2 % (ref 0–1.9)
BILIRUB SERPL-MCNC: 0.3 MG/DL (ref 0.1–1)
BUN SERPL-MCNC: 45 MG/DL (ref 8–23)
CALCIUM SERPL-MCNC: 8.3 MG/DL (ref 8.7–10.5)
CHLORIDE SERPL-SCNC: 97 MMOL/L (ref 95–110)
CO2 SERPL-SCNC: 37 MMOL/L (ref 23–29)
CREAT SERPL-MCNC: 1.6 MG/DL (ref 0.5–1.4)
DELSYS: ABNORMAL
DIFFERENTIAL METHOD: ABNORMAL
EOSINOPHIL # BLD AUTO: 0.3 K/UL (ref 0–0.5)
EOSINOPHIL NFR BLD: 1.7 % (ref 0–8)
ERYTHROCYTE [DISTWIDTH] IN BLOOD BY AUTOMATED COUNT: 15.4 % (ref 11.5–14.5)
ERYTHROCYTE [SEDIMENTATION RATE] IN BLOOD BY WESTERGREN METHOD: 18 MM/H
EST. GFR  (NO RACE VARIABLE): 33.2 ML/MIN/1.73 M^2
FIO2: 35
FIO2: 35
FIO2: 4
FLOW: 6
GLUCOSE SERPL-MCNC: 101 MG/DL (ref 70–110)
GLUCOSE SERPL-MCNC: 154 MG/DL (ref 70–110)
GLUCOSE SERPL-MCNC: 80 MG/DL (ref 70–110)
GLUCOSE SERPL-MCNC: 82 MG/DL (ref 70–110)
HCO3 UR-SCNC: 37.4 MMOL/L (ref 24–28)
HCO3 UR-SCNC: 37.7 MMOL/L (ref 24–28)
HCO3 UR-SCNC: 38.3 MMOL/L (ref 24–28)
HCT VFR BLD AUTO: 29.8 % (ref 37–48.5)
HCT VFR BLD CALC: 25 %PCV (ref 36–54)
HGB BLD-MCNC: 9 G/DL (ref 12–16)
IMM GRANULOCYTES # BLD AUTO: 0.09 K/UL (ref 0–0.04)
IMM GRANULOCYTES NFR BLD AUTO: 0.6 % (ref 0–0.5)
LYMPHOCYTES # BLD AUTO: 1.4 K/UL (ref 1–4.8)
LYMPHOCYTES NFR BLD: 9.2 % (ref 18–48)
MAGNESIUM SERPL-MCNC: 2.2 MG/DL (ref 1.6–2.6)
MCH RBC QN AUTO: 27.2 PG (ref 27–31)
MCHC RBC AUTO-ENTMCNC: 30.2 G/DL (ref 32–36)
MCV RBC AUTO: 90 FL (ref 82–98)
MIN VOL: 10
MODE: ABNORMAL
MONOCYTES # BLD AUTO: 0.8 K/UL (ref 0.3–1)
MONOCYTES NFR BLD: 5.2 % (ref 4–15)
NEUTROPHILS # BLD AUTO: 12.9 K/UL (ref 1.8–7.7)
NEUTROPHILS NFR BLD: 83.1 % (ref 38–73)
NRBC BLD-RTO: 0 /100 WBC
PCO2 BLDA: 49 MMHG (ref 35–45)
PCO2 BLDA: 50.7 MMHG (ref 35–45)
PCO2 BLDA: 57 MMHG (ref 35–45)
PEEP: 5
PEEP: 5
PH SMN: 7.43 [PH] (ref 7.35–7.45)
PH SMN: 7.48 [PH] (ref 7.35–7.45)
PH SMN: 7.5 [PH] (ref 7.35–7.45)
PHOSPHATE SERPL-MCNC: 5 MG/DL (ref 2.7–4.5)
PIP: 18
PLATELET # BLD AUTO: 238 K/UL (ref 150–450)
PMV BLD AUTO: 12.1 FL (ref 9.2–12.9)
PO2 BLDA: 68 MMHG (ref 80–100)
PO2 BLDA: 68 MMHG (ref 80–100)
PO2 BLDA: 71 MMHG (ref 80–100)
POC BE: 13 MMOL/L
POC BE: 14 MMOL/L
POC BE: 15 MMOL/L
POC IONIZED CALCIUM: 1.1 MMOL/L (ref 1.06–1.42)
POC SATURATED O2: 93 % (ref 95–100)
POC SATURATED O2: 94 % (ref 95–100)
POC SATURATED O2: 95 % (ref 95–100)
POC TCO2: 39 MMOL/L (ref 23–27)
POC TCO2: 39 MMOL/L (ref 23–27)
POC TCO2: 40 MMOL/L (ref 23–27)
POTASSIUM BLD-SCNC: 4 MMOL/L (ref 3.5–5.1)
POTASSIUM SERPL-SCNC: 4.2 MMOL/L (ref 3.5–5.1)
PROT SERPL-MCNC: 5.7 G/DL (ref 6–8.4)
PS: 10
RBC # BLD AUTO: 3.31 M/UL (ref 4–5.4)
SAMPLE: ABNORMAL
SITE: ABNORMAL
SODIUM BLD-SCNC: 139 MMOL/L (ref 136–145)
SODIUM SERPL-SCNC: 142 MMOL/L (ref 136–145)
SP02: 96
SPONT RATE: 14
VT: 350
WBC # BLD AUTO: 15.5 K/UL (ref 3.9–12.7)

## 2023-11-07 PROCEDURE — 25000003 PHARM REV CODE 250: Performed by: NURSE PRACTITIONER

## 2023-11-07 PROCEDURE — 63600175 PHARM REV CODE 636 W HCPCS: Performed by: INTERNAL MEDICINE

## 2023-11-07 PROCEDURE — 99900035 HC TECH TIME PER 15 MIN (STAT)

## 2023-11-07 PROCEDURE — 20000000 HC ICU ROOM

## 2023-11-07 PROCEDURE — 63600175 PHARM REV CODE 636 W HCPCS: Performed by: STUDENT IN AN ORGANIZED HEALTH CARE EDUCATION/TRAINING PROGRAM

## 2023-11-07 PROCEDURE — 27000221 HC OXYGEN, UP TO 24 HOURS

## 2023-11-07 PROCEDURE — 82330 ASSAY OF CALCIUM: CPT

## 2023-11-07 PROCEDURE — 25000003 PHARM REV CODE 250: Performed by: INTERNAL MEDICINE

## 2023-11-07 PROCEDURE — 85025 COMPLETE CBC W/AUTO DIFF WBC: CPT | Performed by: FAMILY MEDICINE

## 2023-11-07 PROCEDURE — 82803 BLOOD GASES ANY COMBINATION: CPT

## 2023-11-07 PROCEDURE — 25000003 PHARM REV CODE 250: Performed by: FAMILY MEDICINE

## 2023-11-07 PROCEDURE — 25000003 PHARM REV CODE 250: Performed by: STUDENT IN AN ORGANIZED HEALTH CARE EDUCATION/TRAINING PROGRAM

## 2023-11-07 PROCEDURE — 99900026 HC AIRWAY MAINTENANCE (STAT)

## 2023-11-07 PROCEDURE — 84295 ASSAY OF SERUM SODIUM: CPT

## 2023-11-07 PROCEDURE — 37799 UNLISTED PX VASCULAR SURGERY: CPT

## 2023-11-07 PROCEDURE — 85014 HEMATOCRIT: CPT

## 2023-11-07 PROCEDURE — 94761 N-INVAS EAR/PLS OXIMETRY MLT: CPT

## 2023-11-07 PROCEDURE — 83735 ASSAY OF MAGNESIUM: CPT | Performed by: FAMILY MEDICINE

## 2023-11-07 PROCEDURE — 94799 UNLISTED PULMONARY SVC/PX: CPT

## 2023-11-07 PROCEDURE — 94640 AIRWAY INHALATION TREATMENT: CPT

## 2023-11-07 PROCEDURE — 63600175 PHARM REV CODE 636 W HCPCS: Performed by: FAMILY MEDICINE

## 2023-11-07 PROCEDURE — 80053 COMPREHEN METABOLIC PANEL: CPT | Performed by: FAMILY MEDICINE

## 2023-11-07 PROCEDURE — 99900031 HC PATIENT EDUCATION (STAT)

## 2023-11-07 PROCEDURE — 84100 ASSAY OF PHOSPHORUS: CPT | Performed by: FAMILY MEDICINE

## 2023-11-07 PROCEDURE — C9113 INJ PANTOPRAZOLE SODIUM, VIA: HCPCS | Performed by: FAMILY MEDICINE

## 2023-11-07 PROCEDURE — 94003 VENT MGMT INPAT SUBQ DAY: CPT

## 2023-11-07 PROCEDURE — 99221 1ST HOSP IP/OBS SF/LOW 40: CPT | Mod: ,,, | Performed by: FAMILY MEDICINE

## 2023-11-07 PROCEDURE — 99291 PR CRITICAL CARE, E/M 30-74 MINUTES: ICD-10-PCS | Mod: ,,, | Performed by: INTERNAL MEDICINE

## 2023-11-07 PROCEDURE — 25000242 PHARM REV CODE 250 ALT 637 W/ HCPCS: Performed by: INTERNAL MEDICINE

## 2023-11-07 PROCEDURE — 84132 ASSAY OF SERUM POTASSIUM: CPT

## 2023-11-07 PROCEDURE — 99291 CRITICAL CARE FIRST HOUR: CPT | Mod: ,,, | Performed by: INTERNAL MEDICINE

## 2023-11-07 PROCEDURE — 99221 PR INITIAL HOSPITAL CARE,LEVL I: ICD-10-PCS | Mod: ,,, | Performed by: FAMILY MEDICINE

## 2023-11-07 PROCEDURE — 94760 N-INVAS EAR/PLS OXIMETRY 1: CPT | Mod: XB

## 2023-11-07 RX ORDER — BISACODYL 10 MG
10 SUPPOSITORY, RECTAL RECTAL ONCE
Status: COMPLETED | OUTPATIENT
Start: 2023-11-07 | End: 2023-11-07

## 2023-11-07 RX ADMIN — CEFTRIAXONE SODIUM 1 G: 1 INJECTION, POWDER, FOR SOLUTION INTRAMUSCULAR; INTRAVENOUS at 01:11

## 2023-11-07 RX ADMIN — PANTOPRAZOLE SODIUM 40 MG: 40 INJECTION, POWDER, FOR SOLUTION INTRAVENOUS at 09:11

## 2023-11-07 RX ADMIN — AMIODARONE HYDROCHLORIDE 200 MG: 200 TABLET ORAL at 09:11

## 2023-11-07 RX ADMIN — BISACODYL 10 MG: 10 SUPPOSITORY RECTAL at 06:11

## 2023-11-07 RX ADMIN — MUPIROCIN 1 G: 20 OINTMENT TOPICAL at 09:11

## 2023-11-07 RX ADMIN — METHYLPREDNISOLONE SODIUM SUCCINATE 60 MG: 40 INJECTION, POWDER, FOR SOLUTION INTRAMUSCULAR; INTRAVENOUS at 09:11

## 2023-11-07 RX ADMIN — IPRATROPIUM BROMIDE AND ALBUTEROL SULFATE 3 ML: 2.5; .5 SOLUTION RESPIRATORY (INHALATION) at 06:11

## 2023-11-07 RX ADMIN — PROPOFOL 45 MCG/KG/MIN: 10 INJECTION, EMULSION INTRAVENOUS at 05:11

## 2023-11-07 RX ADMIN — IPRATROPIUM BROMIDE AND ALBUTEROL SULFATE 3 ML: 2.5; .5 SOLUTION RESPIRATORY (INHALATION) at 01:11

## 2023-11-07 RX ADMIN — LORAZEPAM 2 MG: 2 INJECTION INTRAMUSCULAR; INTRAVENOUS at 10:11

## 2023-11-07 RX ADMIN — ENOXAPARIN SODIUM 70 MG: 80 INJECTION SUBCUTANEOUS at 06:11

## 2023-11-07 RX ADMIN — FUROSEMIDE 40 MG: 10 INJECTION, SOLUTION INTRAMUSCULAR; INTRAVENOUS at 09:11

## 2023-11-07 RX ADMIN — CLOPIDOGREL BISULFATE 75 MG: 75 TABLET, FILM COATED ORAL at 09:11

## 2023-11-07 RX ADMIN — ASPIRIN 81 MG 81 MG: 81 TABLET ORAL at 09:11

## 2023-11-07 RX ADMIN — IPRATROPIUM BROMIDE AND ALBUTEROL SULFATE 3 ML: 2.5; .5 SOLUTION RESPIRATORY (INHALATION) at 07:11

## 2023-11-07 RX ADMIN — ALLOPURINOL 50 MG: 300 TABLET ORAL at 09:11

## 2023-11-07 RX ADMIN — MUPIROCIN: 20 OINTMENT TOPICAL at 09:11

## 2023-11-07 NOTE — NURSING
Diprivan paused at 0335 for SBT. Pt Placed on pressure support.   At 0358, patient's blood pressure was 201/89 and her heart rate was 125. Diprivan was resumed, and the patient was placed back on assist control.

## 2023-11-07 NOTE — NURSING
[facility]  Wound Care    Patient Name:  Marisol Kerr   MRN:  [id number]  Date: 2023  Diagnosis: Acute respiratory failure with hypoxia and hypercarbia    History:     Past Medical History:   Diagnosis Date    CAD (coronary artery disease)     Cancer     colon    CHF (congestive heart failure)     Colitis     Colon cancer     COPD (chronic obstructive pulmonary disease)     Decreased hearing     Diabetes mellitus     Diabetes mellitus, type 2     Hypercholesterolemia     Hypertension     Hypoxemia     Insomnia     Obesity     Osteoarthritis        Social History     Socioeconomic History    Marital status:    Tobacco Use    Smoking status: Former     Current packs/day: 0.00     Average packs/day: 3.0 packs/day for 50.0 years (150.1 ttl pk-yrs)     Types: Cigarettes     Start date: 3/30/1964     Quit date: 2006     Years since quittin.7    Smokeless tobacco: Never    Tobacco comments:     ex smoker 15 years   Substance and Sexual Activity    Alcohol use: Yes    Drug use: No    Sexual activity: Never     Social Determinants of Health     Financial Resource Strain: Unknown (2023)    Overall Financial Resource Strain (CARDIA)     Difficulty of Paying Living Expenses: Patient refused   Food Insecurity: No Food Insecurity (2023)    Hunger Vital Sign     Worried About Running Out of Food in the Last Year: Never true     Ran Out of Food in the Last Year: Never true   Transportation Needs: No Transportation Needs (2023)    PRAPARE - Transportation     Lack of Transportation (Medical): No     Lack of Transportation (Non-Medical): No   Physical Activity: Unknown (2023)    Exercise Vital Sign     Days of Exercise per Week: Patient refused     Minutes of Exercise per Session: Patient refused   Recent Concern: Physical Activity - Inactive (2023)    Exercise Vital Sign     Days of Exercise per Week: 0 days     Minutes of Exercise per Session: 0 min   Stress: Unknown  (7/28/2023)    [employer] - Occupational Stress Questionnaire     Feeling of Stress : Patient refused   Recent Concern: Stress - Stress Concern Present (7/23/2023)    [employer] - Occupational Stress Questionnaire     Feeling of Stress : Rather much   Social Connections: Unknown (7/28/2023)    Social Connection and Isolation Panel [NHANES]     Frequency of Communication with Friends and Family: Patient refused     Frequency of Social Gatherings with Friends and Family: Patient refused     Attends Rastafari Services: Patient refused     Active Member of Clubs or Organizations: Patient refused     Attends Club or Organization Meetings: Patient refused     Marital Status:    Recent Concern: Social Connections - Socially Isolated (7/23/2023)    Social Connection and Isolation Panel [NHANES]     Frequency of Communication with Friends and Family: More than three times a week     Frequency of Social Gatherings with Friends and Family: More than three times a week     Attends Rastafari Services: Never     Active Member of Clubs or Organizations: No     Attends Club or Organization Meetings: Never     Marital Status:    Housing Stability: Low Risk  (7/28/2023)    Housing Stability Vital Sign     Unable to Pay for Housing in the Last Year: No     Number of Places Lived in the Last Year: 1     Unstable Housing in the Last Year: No       Precautions:     Allergies as of 11/04/2023 - Reviewed 11/04/2023   Allergen Reaction Noted    Iodinated contrast media  06/15/2015    Strawberries [strawberry] Hives and Itching 08/22/2016       [facility] Assessment Details/Treatment   78 yr old female  daughter is just leaving   Sacral on admit 11/04/23 red DTI   11/07/23  1300        11/07/23  1445 area 10x10 open     ?End of life Stephane ulcer per advancement of the ulcer  shape, critical condition   Will have Dr Barkley to see pt       11/07/2023

## 2023-11-07 NOTE — PROGRESS NOTES
The patient was successfully extubated today.  Will make sure she sleeps on BiPAP tonight.  Will repeat an ABG in the morning.  Spoke with the patient's daughter who states she would like her reintubated overnight if necessary.  She will talk with her mother about this in the morning.  No CPR is still in force.

## 2023-11-07 NOTE — ASSESSMENT & PLAN NOTE
No reported history of atrial fibrillation, EKG showed atrial fibrillation with rapid ventricular response  Cardiology notified, patient started amiodarone drip.  This was discontinued, amiodarone p.o. started.  Patient on full-dose Lovenox.

## 2023-11-07 NOTE — PLAN OF CARE
Problem: Infection  Goal: Absence of Infection Signs and Symptoms  Outcome: Ongoing, Progressing     Problem: Adult Inpatient Plan of Care  Goal: Plan of Care Review  Outcome: Ongoing, Progressing  Goal: Patient-Specific Goal (Individualized)  Outcome: Ongoing, Progressing  Goal: Absence of Hospital-Acquired Illness or Injury  Outcome: Ongoing, Progressing  Goal: Optimal Comfort and Wellbeing  Outcome: Ongoing, Progressing  Goal: Readiness for Transition of Care  Outcome: Ongoing, Progressing     Problem: Diabetes Comorbidity  Goal: Blood Glucose Level Within Targeted Range  Outcome: Ongoing, Progressing     Problem: Fluid Imbalance (Pneumonia)  Goal: Fluid Balance  Outcome: Ongoing, Progressing     Problem: Infection (Pneumonia)  Goal: Resolution of Infection Signs and Symptoms  Outcome: Ongoing, Progressing     Problem: Respiratory Compromise (Pneumonia)  Goal: Effective Oxygenation and Ventilation  Outcome: Ongoing, Progressing     Problem: Impaired Wound Healing  Goal: Optimal Wound Healing  Outcome: Ongoing, Progressing     Problem: Communication Impairment (Mechanical Ventilation, Invasive)  Goal: Effective Communication  Outcome: Ongoing, Progressing     Problem: Device-Related Complication Risk (Mechanical Ventilation, Invasive)  Goal: Optimal Device Function  Outcome: Ongoing, Progressing     Problem: Inability to Wean (Mechanical Ventilation, Invasive)  Goal: Mechanical Ventilation Liberation  Outcome: Ongoing, Progressing     Problem: Nutrition Impairment (Mechanical Ventilation, Invasive)  Goal: Optimal Nutrition Delivery  Outcome: Ongoing, Progressing     Problem: Skin and Tissue Injury (Mechanical Ventilation, Invasive)  Goal: Absence of Device-Related Skin and Tissue Injury  Outcome: Ongoing, Progressing     Problem: Ventilator-Induced Lung Injury (Mechanical Ventilation, Invasive)  Goal: Absence of Ventilator-Induced Lung Injury  Outcome: Ongoing, Progressing     Problem: Communication Impairment  (Artificial Airway)  Goal: Effective Communication  Outcome: Ongoing, Progressing     Problem: Device-Related Complication Risk (Artificial Airway)  Goal: Optimal Device Function  Outcome: Ongoing, Progressing     Problem: Skin and Tissue Injury (Artificial Airway)  Goal: Absence of Device-Related Skin or Tissue Injury  Outcome: Ongoing, Progressing     Problem: Noninvasive Ventilation Acute  Goal: Effective Unassisted Ventilation and Oxygenation  Outcome: Ongoing, Progressing     Problem: Skin Injury Risk Increased  Goal: Skin Health and Integrity  Outcome: Ongoing, Progressing     Problem: Fall Injury Risk  Goal: Absence of Fall and Fall-Related Injury  Outcome: Ongoing, Progressing     Problem: Restraint, Nonbehavioral (Nonviolent)  Goal: Absence of Harm or Injury  Outcome: Ongoing, Progressing     Problem: Feeding Intolerance (Enteral Nutrition)  Goal: Feeding Tolerance  Outcome: Ongoing, Progressing

## 2023-11-07 NOTE — ASSESSMENT & PLAN NOTE
Continue to monitor, patient currently receiving inhalers and Lasix  The patients latest potassium has been reviewed and the results are listed below  Recent Labs   Lab 11/07/23  0406   K 4.2

## 2023-11-07 NOTE — CONSULTS
"Chief complaint:  Unresponsive (Found unresponsive in wheelchair at home/)      HPI:  Marisol Kerr is a 78 y.o. female presenting with  a necrotic unstagable pressure ulcer. Pt was admitted with a DTI on , and the ulcer rapidly progressed to an unstagable pressure ulcer over three days. Pts ulcer has the developing "butterfly" shape which is characteristic of a Stephane ulcer. Pt is a very soft spoken 79 yo female, admitted with end stage COPD, chronic respiratory failure who is on hospice. She has no other complaints at this time.     PMH:  As per HPI and below:  Past Medical History:   Diagnosis Date    CAD (coronary artery disease)     Cancer     colon    CHF (congestive heart failure)     Colitis     Colon cancer     COPD (chronic obstructive pulmonary disease)     Decreased hearing     Diabetes mellitus     Diabetes mellitus, type 2     Hypercholesterolemia     Hypertension     Hypoxemia     Insomnia     Obesity     Osteoarthritis        Social History     Socioeconomic History    Marital status:    Tobacco Use    Smoking status: Former     Current packs/day: 0.00     Average packs/day: 3.0 packs/day for 50.0 years (150.1 ttl pk-yrs)     Types: Cigarettes     Start date: 3/30/1964     Quit date: 2006     Years since quittin.7    Smokeless tobacco: Never    Tobacco comments:     ex smoker 15 years   Substance and Sexual Activity    Alcohol use: Yes    Drug use: No    Sexual activity: Never     Social Determinants of Health     Financial Resource Strain: Unknown (2023)    Overall Financial Resource Strain (CARDIA)     Difficulty of Paying Living Expenses: Patient refused   Food Insecurity: No Food Insecurity (2023)    Hunger Vital Sign     Worried About Running Out of Food in the Last Year: Never true     Ran Out of Food in the Last Year: Never true   Transportation Needs: No Transportation Needs (2023)    PRAPARE - Transportation     Lack of Transportation (Medical): No "     Lack of Transportation (Non-Medical): No   Physical Activity: Unknown (7/28/2023)    Exercise Vital Sign     Days of Exercise per Week: Patient refused     Minutes of Exercise per Session: Patient refused   Recent Concern: Physical Activity - Inactive (7/23/2023)    Exercise Vital Sign     Days of Exercise per Week: 0 days     Minutes of Exercise per Session: 0 min   Stress: Unknown (7/28/2023)    [employer] - Occupational Stress Questionnaire     Feeling of Stress : Patient refused   Recent Concern: Stress - Stress Concern Present (7/23/2023)    [employer] - Occupational Stress Questionnaire     Feeling of Stress : Rather much   Social Connections: Unknown (7/28/2023)    Social Connection and Isolation Panel [NHANES]     Frequency of Communication with Friends and Family: Patient refused     Frequency of Social Gatherings with Friends and Family: Patient refused     Attends [employer] Services: Patient refused     Active Member of Clubs or Organizations: Patient refused     Attends Club or Organization Meetings: Patient refused     Marital Status:    Recent Concern: Social Connections - Socially Isolated (7/23/2023)    Social Connection and Isolation Panel [NHANES]     Frequency of Communication with Friends and Family: More than three times a week     Frequency of Social Gatherings with Friends and Family: More than three times a week     Attends [employer]ism Services: Never     Active Member of Clubs or Organizations: No     Attends Club or Organization Meetings: Never     Marital Status:    Housing Stability: Low Risk  (7/28/2023)    Housing Stability Vital Sign     Unable to Pay for Housing in the Last Year: No     Number of Places Lived in the Last Year: 1     Unstable Housing in the Last Year: No       Past Surgical History:   Procedure Laterality Date    ANGIOGRAM, CORONARY, WITH LEFT HEART CATHETERIZATION N/A 2/10/2022    Procedure:  Angiogram, Coronary, with Left Heart Cath;  Surgeon: Cristian Benjamin MD;  Location: [facility] CATH/EP LAB;  Service: Cardiology;  Laterality: N/A;    CARDIAC CATHETERIZATION      CHOLECYSTECTOMY      COLECTOMY      CORONARY ANGIOGRAPHY N/A 8/29/2023    Procedure: ANGIOGRAM, CORONARY ARTERY;  Surgeon: Nba Riggs MD;  Location: [facility] CATH LAB;  Service: Cardiology;  Laterality: N/A;    CORONARY ANGIOPLASTY      CORONARY STENT PLACEMENT      ERCP N/A 1/16/2023    Procedure: ERCP (ENDOSCOPIC RETROGRADE CHOLANGIOPANCREATOGRAPHY);  Surgeon: Biju Kramer III, MD;  Location: [facility] ENDO;  Service: Endoscopy;  Laterality: N/A;    ESOPHAGOGASTRODUODENOSCOPY N/A 1/29/2023    Procedure: EGD (ESOPHAGOGASTRODUODENOSCOPY);  Surgeon: Aarti Alvarez MD;  Location: [facility] ENDO;  Service: Endoscopy;  Laterality: N/A;    EXTERNAL EAR SURGERY      EYE SURGERY      FLEXIBLE SIGMOIDOSCOPY N/A 9/19/2019    Procedure: SIGMOIDOSCOPY, FLEXIBLE;  Surgeon: Biju Kramer III, MD;  Location: [facility] ENDO;  Service: Endoscopy;  Laterality: N/A;    HYSTERECTOMY      partial    INSTANTANEOUS WAVE-FREE RATIO (IFR) N/A 8/29/2023    Procedure: Instantaneous Wave-Free Ratio (IFR);  Surgeon: Nba Riggs MD;  Location: [facility] CATH LAB;  Service: Cardiology;  Laterality: N/A;    STENT, DRUG ELUTING, SINGLE VESSEL, CORONARY N/A 8/29/2023    Procedure: Stent, Drug Eluting, Single Vessel, Coronary;  Surgeon: Nba Riggs MD;  Location: [facility] CATH LAB;  Service: Cardiology;  Laterality: N/A;       Family History   Problem Relation Age of Onset    Febrile seizures Mother     Heart failure Father        Review of patient's allergies indicates:   Allergen Reactions    Iodinated contrast media     Strawberries [strawberry] Hives and Itching       No current facility-administered medications on file prior to encounter.     Current Outpatient Medications on File Prior to Encounter   Medication Sig Dispense Refill    albuterol (VENTOLIN HFA)  90 mcg/actuation inhaler Inhale 2 puffs into the lungs every 6 (six) hours as needed for Wheezing or Shortness of Breath. Rescue 36 g 3    albuterol-ipratropium (DUO-NEB) 2.5 mg-0.5 mg/3 mL nebulizer solution Take 3 mLs by nebulization every 4 (four) hours as needed for Wheezing or Shortness of Breath. Rescue 120 each 6    allopurinoL (ZYLOPRIM) 100 MG tablet Take 0.5 tablets (50 mg total) by mouth once daily. 45 tablet 1    ALPRAZolam (XANAX) 0.25 MG tablet Take 1 tablet (0.25 mg total) by mouth every evening. 90 tablet 1    amLODIPine (NORVASC) 10 MG tablet Take 1 tablet (10 mg total) by mouth once daily. 30 tablet 11    aspirin 81 MG Chew Chew and swallow 1 tablet (81 mg total) by mouth once daily. 30 tablet 11    atorvastatin (LIPITOR) 40 MG tablet Take 1 tablet (40 mg total) by mouth once daily. 90 tablet 3    cholestyramine-aspartame (QUESTRAN/PREVALITE LIGHT) 4 gram Powd Take 4 g by mouth once daily.      clopidogreL (PLAVIX) 75 mg tablet Take 1 tablet (75 mg total) by mouth once daily. 30 tablet 11    coenzyme Q10 100 mg capsule Take 100 mg by mouth every morning.      dexAMETHasone (DECADRON) 4 MG Tab Take 4 mg by mouth Daily.      ergocalciferol (VITAMIN D2) 50,000 unit Cap Take 1 capsule (50,000 Units total) by mouth every 7 days. 12 capsule 1    ferrous sulfate 325 (65 FE) MG EC tablet Take 325 mg by mouth once daily.      fish oil-omega-3 fatty acids 300-1,000 mg capsule Take 2 capsules by mouth every morning.      furosemide (LASIX) 20 MG tablet Take 1 tablet (20 mg total) by mouth every other day. TAKE WITH POTASSIUM 30 tablet 4    gabapentin (NEURONTIN) 300 MG capsule Take 300 mg by mouth Daily.      ipratropium-albuteroL (COMBIVENT RESPIMAT)  mcg/actuation inhaler Inhale 2 puffs into the lungs every 4 (four) hours as needed for Shortness of Breath. Rescue 12 g 3    ketoconazole (NIZORAL) 2 % cream Apply 1 application  topically 2 (two) times daily.      magnesium oxide (MAG-OX) 400 mg (241.3  mg magnesium) tablet Take 1 tablet by mouth 2 (two) times daily.      metoprolol succinate (TOPROL-XL) 200 MG 24 hr tablet Take 1 tablet (200 mg total) by mouth once daily. 30 tablet 5    mupirocin (BACTROBAN) 2 % ointment Apply topically 2 (two) times daily. (Patient taking differently: Apply 1 g topically 2 (two) times daily.) 30 g 3    nitroGLYCERIN (NITROSTAT) 0.4 MG SL tablet Place 1 tablet (0.4 mg total) under the tongue every 5 (five) minutes as needed for Chest pain. Up to 3 doses. If chest pain is not relieved or worsens 3 to 5 minutes after 1 dose, call 911 and seek immediate emergency medical attention. 25 tablet 2    pantoprazole (PROTONIX) 40 MG tablet Take 1 tablet (40 mg total) by mouth once daily. 90 tablet 3    potassium chloride (MICRO-K) 10 MEQ CpSR Take 1 capsule (10 mEq total) by mouth every other day. (TAKE WITH LASIX/FUROSEMIDE) 15 capsule 0    triamcinolone acetonide 0.1% (KENALOG) 0.1 % cream Apply 1 g topically 2 (two) times daily.      umeclidinium (INCRUSE ELLIPTA) 62.5 mcg/actuation inhalation capsule Inhale 62.5 mcg into the lungs every morning. Controller 90 each 3    vit C/E/Zn/coppr/lutein/zeaxan (PRESERVISION AREDS-2 ORAL) Take 1 tablet by mouth once daily.      VITAMIN C 500 MG tablet Take 500 mg by mouth 2 (two) times daily.      [DISCONTINUED] benazepriL (LOTENSIN) 40 MG tablet Take 1 tablet (40 mg total) by mouth once daily. 90 tablet 1    [DISCONTINUED] cimetidine (TAGAMET) 300 MG tablet Take 1 tablet (300 mg total) by mouth every 6 (six) hours. Take 1 tablet (300 mg total) by mouth starting at 6 PM on Sunday April 17, 2022 (the evening before your angiogram procedure). Then take 1 tablet at 12 AM, then take 1 tablet at 6 AM on Monday April 18th. 3 tablet 0    [DISCONTINUED] lisinopriL 10 MG tablet Take 1 tablet (10 mg total) by mouth once daily. HOLD UNTIL OTHERWISE DIRECTED BY PRIMARY CARE PROVIDER 90 tablet 3    [DISCONTINUED] lovastatin (MEVACOR) 40 MG tablet Take 1 tablet  "(40 mg total) by mouth every other day. 45 tablet 3       ROS: As per HPI and below:  Pertinent items are noted in HPI.      Physical Exam:     Vitals:    23 1215 23 1230 23 1320 23 1334   BP:       Pulse: 109 110 108 (!) 114   Resp: (!) 35 19 (!) 27 (!) 34   Temp:       TempSrc:       SpO2: (!) 92% 96% 100% 96%   Weight:       Height:           BP  Min: 61/29  Max: 219/155  Temp  Av.8 °F (36.6 °C)  Min: 96.8 °F (36 °C)  Max: 98.7 °F (37.1 °C)  Pulse  Av.2  Min: 54  Max: 141  Resp  Av.2  Min: 12  Max: 44  SpO2  Av.1 %  Min: 90 %  Max: 100 %  Height  Av' 3" (160 cm)  Min: 5' 2.99" (160 cm)  Max: 5' 3" (160 cm)  Weight  Av.1 kg (165 lb 7.6 oz)  Min: 68.9 kg (151 lb 14.4 oz)  Max: 80 kg (176 lb 5.9 oz)    Body mass index is 29.13 kg/m².          General:             Well developed, well nourished, no apparent distress  HEENT:              NCAT, no JVD, mucous membranes moist, EOM intact  Cardiovascular:  Regular rate and rhythm, normal S1, normal S2, No murmurs, rubs, or gallops  Respiratory:        Normal breath sounds, no wheezes, no rales, no rhonchi  Abdomen:           Bowel sounds present, non tender, non distended, no masses, no hepatojugular reflux  Extremities:        No clubbing, no cyanosis, no edema  Vascular:            2+ b/l radial.  Peripheral pulses intact.  No carotid bruits.  Neurological:      No focal deficits  Skin:                   No obvious rashes or erythema, necrotic unstagable pressure ulcer of the sacrum, rapidly progressed from a DTI on admission, color and shape appear to be a Stephane ulcer    23                Lab Results   Component Value Date    WBC 15.50 (H) 2023    HGB 9.0 (L) 2023    HCT 25 (L) 2023    MCV 90 2023     2023     Lab Results   Component Value Date    CHOL 96 (L) 2023    CHOL 165 2021    CHOL 151 2021     Lab Results   Component Value Date    HDL " 44 07/22/2023    HDL 53 09/21/2021    HDL 48 01/07/2021     Lab Results   Component Value Date    LDLCALC 31.8 (L) 07/22/2023    LDLCALC 98.2 09/21/2021    LDLCALC 81.2 01/07/2021     Lab Results   Component Value Date    TRIG 101 07/22/2023    TRIG 69 09/21/2021    TRIG 109 01/07/2021     Lab Results   Component Value Date    CHOLHDL 45.8 07/22/2023    CHOLHDL 32.1 09/21/2021    CHOLHDL 31.8 01/07/2021     CMP  Recent Labs   Lab 11/07/23  0406   GLU 82   CALCIUM 8.3*   ALBUMIN 3.0*   PROT 5.7*      K 4.2   CO2 37*   CL 97   BUN 45*   CREATININE 1.6*   ALKPHOS 67   ALT 8*   AST 14   BILITOT 0.3      Lab Results   Component Value Date    TSH 0.902 08/29/2023           Assessment and Recommendations       Diagnoses:    1. Unstagable sacral pressure ulcer, appears to be a Stephane terminal ulcer, rapidly progressing sacral ulcer over three days from a DTI to a necrotic ulcer    Plan:  Triad + Mepilex daily  2. FU inpatient as needed    Complexity:    low

## 2023-11-07 NOTE — PROGRESS NOTES
"[facility]  Department of Cardiology  Progress Note      PATIENT NAME: Marisol Kerr  MRN: [id number]  TODAY'S DATE: 11/07/2023  ADMIT DATE: 11/4/2023                          CONSULT REQUESTED BY: Radha Maravilla MD    SUBJECTIVE     PRINCIPAL PROBLEM: Acute respiratory failure with hypoxia and hypercarbia      REASON FOR CONSULT:  Elevated troponin, ADHF    INTERVAL HISTORY:  Cr 1.6, VSS;  Recently extubated and on NC  Denies chest pain  +coughing  -2200 net balance    HPI:  Pt intubated/sedated. No family present.  HPI taken from medical record:    "Marisol Kerr is a 78 y.o. White female   With PMH of HFpEF, COPD,   CAD, DM2, HTN,   who presents with SOB.     Pt currently intubated  Per EMS report, pt was found gasping for air  Said "help me, help me"  then went unresponsive  She was seated, no head trauma  Pt intubated before arrival to ER  No cardiac arrest occured     Pt was DNR, DNI + on hospice  but hospice + DNR/DNI has been revoked by her daughter"         Review of patient's allergies indicates:   Allergen Reactions    Iodinated contrast media     Strawberries [strawberry] Hives and Itching       Past Medical History:   Diagnosis Date    CAD (coronary artery disease)     Cancer     colon    CHF (congestive heart failure)     Colitis     Colon cancer     COPD (chronic obstructive pulmonary disease)     Decreased hearing     Diabetes mellitus     Diabetes mellitus, type 2     Hypercholesterolemia     Hypertension     Hypoxemia     Insomnia     Obesity     Osteoarthritis      Past Surgical History:   Procedure Laterality Date    ANGIOGRAM, CORONARY, WITH LEFT HEART CATHETERIZATION N/A 2/10/2022    Procedure: Angiogram, Coronary, with Left Heart Cath;  Surgeon: Cristian Benjamin MD;  Location: [facility] CATH/EP LAB;  Service: Cardiology;  Laterality: N/A;    CARDIAC CATHETERIZATION      CHOLECYSTECTOMY      COLECTOMY      CORONARY ANGIOGRAPHY N/A 8/29/2023    Procedure: ANGIOGRAM, CORONARY ARTERY;  " Surgeon: Nba Riggs MD;  Location: [facility] CATH LAB;  Service: Cardiology;  Laterality: N/A;    CORONARY ANGIOPLASTY      CORONARY STENT PLACEMENT      ERCP N/A 2023    Procedure: ERCP (ENDOSCOPIC RETROGRADE CHOLANGIOPANCREATOGRAPHY);  Surgeon: Biju Kramer III, MD;  Location: [facility] ENDO;  Service: Endoscopy;  Laterality: N/A;    ESOPHAGOGASTRODUODENOSCOPY N/A 2023    Procedure: EGD (ESOPHAGOGASTRODUODENOSCOPY);  Surgeon: Aarti Alvarez MD;  Location: [facility] ENDO;  Service: Endoscopy;  Laterality: N/A;    EXTERNAL EAR SURGERY      EYE SURGERY      FLEXIBLE SIGMOIDOSCOPY N/A 2019    Procedure: SIGMOIDOSCOPY, FLEXIBLE;  Surgeon: Biju Kramer III, MD;  Location: [facility] ENDO;  Service: Endoscopy;  Laterality: N/A;    HYSTERECTOMY      partial    INSTANTANEOUS WAVE-FREE RATIO (IFR) N/A 2023    Procedure: Instantaneous Wave-Free Ratio (IFR);  Surgeon: Nba Riggs MD;  Location: [facility] CATH LAB;  Service: Cardiology;  Laterality: N/A;    STENT, DRUG ELUTING, SINGLE VESSEL, CORONARY N/A 2023    Procedure: Stent, Drug Eluting, Single Vessel, Coronary;  Surgeon: Nba Riggs MD;  Location: [facility] CATH LAB;  Service: Cardiology;  Laterality: N/A;     Social History     Tobacco Use    Smoking status: Former     Current packs/day: 0.00     Average packs/day: 3.0 packs/day for 50.0 years (150.1 ttl pk-yrs)     Types: Cigarettes     Start date: 3/30/1964     Quit date: 2006     Years since quittin.7    Smokeless tobacco: Never    Tobacco comments:     ex smoker 15 years   Substance Use Topics    Alcohol use: Yes    Drug use: No        REVIEW OF SYSTEMS  See interval history    OBJECTIVE     VITAL SIGNS (Most Recent)  Temp: 97.7 °F (36.5 °C) (23 030)  Pulse: 95 (23 08)  Resp: (!) 29 (23)  BP: 134/60 (23 0315)  SpO2: 99 % (23)    VENTILATION STATUS  Resp: (!) 29 (23)  SpO2: 99 % (23)  Vent Mode: A/C  Oxygen  Concentration (%):  [35] 35  Resp Rate Total:  [0 br/min-32 br/min] 26 br/min  Vt Set:  [350 mL] 350 mL  PEEP/CPAP:  [5 cmH20] 5 cmH20  Pressure Support:  [10 cmH20] 10 cmH20  Mean Airway Pressure:  [7.9 wuF11-66 cmH20] 9.7 cmH20        I & O (Last 24H):  Intake/Output Summary (Last 24 hours) at 11/7/2023 0850  Last data filed at 11/7/2023 0604  Gross per 24 hour   Intake 1422.78 ml   Output 1950 ml   Net -527.22 ml         WEIGHTS  Wt Readings from Last 3 Encounters:   11/07/23 0400 74.6 kg (164 lb 7.4 oz)   11/06/23 0400 68.9 kg (151 lb 14.4 oz)   11/05/23 0400 80 kg (176 lb 5.9 oz)   11/04/23 1520 74.9 kg (165 lb 2 oz)   11/04/23 1048 76.9 kg (169 lb 8.5 oz)   09/05/23 0415 74.6 kg (164 lb 7.4 oz)   09/04/23 0522 73.7 kg (162 lb 7.7 oz)   09/01/23 2241 69 kg (152 lb 1.9 oz)   09/01/23 0603 81.2 kg (179 lb 0.2 oz)   08/31/23 0442 80.2 kg (176 lb 12.9 oz)   08/30/23 0357 81 kg (178 lb 9.2 oz)   08/29/23 1146 77.2 kg (170 lb 3.1 oz)   08/28/23 0400 77.2 kg (170 lb 3.1 oz)   08/27/23 0714 75 kg (165 lb 5.5 oz)   08/27/23 0400 75 kg (165 lb 5.5 oz)   08/26/23 0230 73.4 kg (161 lb 13.1 oz)   08/25/23 2211 68 kg (150 lb)       PHYSICAL EXAM    GENERAL: chronically ill appearing elderly female resting comfortably in bed  HEENT: Normocephalic.    NECK: No JVD.   CARDIAC: Irregular rate and rhythm.   CHEST ANATOMY: normal.   LUNGS: Extubated, breathing comfortably on low flow O2; Clear breath sounds, diminished bases  ABDOMEN: Soft obese,  + bowel sounds.    EXTREMITIES: No edema  CENTRAL NERVOUS SYSTEM: alert, responsive, moves all extremities  SKIN: No rash     HOME MEDICATIONS:  No current facility-administered medications on file prior to encounter.     Current Outpatient Medications on File Prior to Encounter   Medication Sig Dispense Refill    albuterol (VENTOLIN HFA) 90 mcg/actuation inhaler Inhale 2 puffs into the lungs every 6 (six) hours as needed for Wheezing or Shortness of Breath. Rescue 36 g 3     albuterol-ipratropium (DUO-NEB) 2.5 mg-0.5 mg/3 mL nebulizer solution Take 3 mLs by nebulization every 4 (four) hours as needed for Wheezing or Shortness of Breath. Rescue 120 each 6    allopurinoL (ZYLOPRIM) 100 MG tablet Take 0.5 tablets (50 mg total) by mouth once daily. 45 tablet 1    ALPRAZolam (XANAX) 0.25 MG tablet Take 1 tablet (0.25 mg total) by mouth every evening. 90 tablet 1    amLODIPine (NORVASC) 10 MG tablet Take 1 tablet (10 mg total) by mouth once daily. 30 tablet 11    aspirin 81 MG Chew Chew and swallow 1 tablet (81 mg total) by mouth once daily. 30 tablet 11    atorvastatin (LIPITOR) 40 MG tablet Take 1 tablet (40 mg total) by mouth once daily. 90 tablet 3    cholestyramine-aspartame (QUESTRAN/PREVALITE LIGHT) 4 gram Powd Take 4 g by mouth once daily.      clopidogreL (PLAVIX) 75 mg tablet Take 1 tablet (75 mg total) by mouth once daily. 30 tablet 11    coenzyme Q10 100 mg capsule Take 100 mg by mouth every morning.      dexAMETHasone (DECADRON) 4 MG Tab Take 4 mg by mouth Daily.      ergocalciferol (VITAMIN D2) 50,000 unit Cap Take 1 capsule (50,000 Units total) by mouth every 7 days. 12 capsule 1    ferrous sulfate 325 (65 FE) MG EC tablet Take 325 mg by mouth once daily.      fish oil-omega-3 fatty acids 300-1,000 mg capsule Take 2 capsules by mouth every morning.      furosemide (LASIX) 20 MG tablet Take 1 tablet (20 mg total) by mouth every other day. TAKE WITH POTASSIUM 30 tablet 4    gabapentin (NEURONTIN) 300 MG capsule Take 300 mg by mouth Daily.      ipratropium-albuteroL (COMBIVENT RESPIMAT)  mcg/actuation inhaler Inhale 2 puffs into the lungs every 4 (four) hours as needed for Shortness of Breath. Rescue 12 g 3    ketoconazole (NIZORAL) 2 % cream Apply 1 application  topically 2 (two) times daily.      magnesium oxide (MAG-OX) 400 mg (241.3 mg magnesium) tablet Take 1 tablet by mouth 2 (two) times daily.      metoprolol succinate (TOPROL-XL) 200 MG 24 hr tablet Take 1 tablet  (200 mg total) by mouth once daily. 30 tablet 5    mupirocin (BACTROBAN) 2 % ointment Apply topically 2 (two) times daily. (Patient taking differently: Apply 1 g topically 2 (two) times daily.) 30 g 3    nitroGLYCERIN (NITROSTAT) 0.4 MG SL tablet Place 1 tablet (0.4 mg total) under the tongue every 5 (five) minutes as needed for Chest pain. Up to 3 doses. If chest pain is not relieved or worsens 3 to 5 minutes after 1 dose, call 911 and seek immediate emergency medical attention. 25 tablet 2    pantoprazole (PROTONIX) 40 MG tablet Take 1 tablet (40 mg total) by mouth once daily. 90 tablet 3    potassium chloride (MICRO-K) 10 MEQ CpSR Take 1 capsule (10 mEq total) by mouth every other day. (TAKE WITH LASIX/FUROSEMIDE) 15 capsule 0    triamcinolone acetonide 0.1% (KENALOG) 0.1 % cream Apply 1 g topically 2 (two) times daily.      umeclidinium (INCRUSE ELLIPTA) 62.5 mcg/actuation inhalation capsule Inhale 62.5 mcg into the lungs every morning. Controller 90 each 3    vit C/E/Zn/coppr/lutein/zeaxan (PRESERVISION AREDS-2 ORAL) Take 1 tablet by mouth once daily.      VITAMIN C 500 MG tablet Take 500 mg by mouth 2 (two) times daily.      [DISCONTINUED] benazepriL (LOTENSIN) 40 MG tablet Take 1 tablet (40 mg total) by mouth once daily. 90 tablet 1    [DISCONTINUED] cimetidine (TAGAMET) 300 MG tablet Take 1 tablet (300 mg total) by mouth every 6 (six) hours. Take 1 tablet (300 mg total) by mouth starting at 6 PM on Sunday April 17, 2022 (the evening before your angiogram procedure). Then take 1 tablet at 12 AM, then take 1 tablet at 6 AM on Monday April 18th. 3 tablet 0    [DISCONTINUED] lisinopriL 10 MG tablet Take 1 tablet (10 mg total) by mouth once daily. HOLD UNTIL OTHERWISE DIRECTED BY PRIMARY CARE PROVIDER 90 tablet 3    [DISCONTINUED] lovastatin (MEVACOR) 40 MG tablet Take 1 tablet (40 mg total) by mouth every other day. 45 tablet 3       SCHEDULED MEDS:   albuterol-ipratropium  3 mL Nebulization Q6H    allopurinoL   50 mg Oral Daily    amiodarone  200 mg Oral BID    aspirin  81 mg Oral Daily    atorvastatin  40 mg Oral QHS    cefTRIAXone (ROCEPHIN) IVPB  1 g Intravenous Q24H    clopidogreL  75 mg Oral Daily    enoxparin  1 mg/kg Subcutaneous Q24H (treatment, non-standard time)    furosemide (LASIX) injection  40 mg Intravenous Daily    methylPREDNISolone sodium succinate injection  60 mg Intravenous Daily    mupirocin   Nasal BID    pantoprazole  40 mg Intravenous Daily    potassium, sodium phosphates  1 packet Oral Once       CONTINUOUS INFUSIONS:   dexmedeTOMIDine (Precedex) infusion (titrating) Stopped (11/06/23 1231)    fentanyl Stopped (11/06/23 0300)    propofoL 45 mcg/kg/min (11/07/23 0604)       PRN MEDS:acetaminophen, albuterol-ipratropium, calcium gluconate IVPB, calcium gluconate IVPB, calcium gluconate IVPB, dextrose 50%, dextrose 50%, glucagon (human recombinant), glucose, glucose, HYDROcodone-acetaminophen, insulin aspart U-100, lorazepam, magnesium sulfate IVPB, magnesium sulfate IVPB, melatonin, metoprolol, morphine, naloxone, ondansetron, polyethylene glycol, potassium chloride **AND** potassium chloride **AND** potassium chloride, senna-docusate 8.6-50 mg, simethicone, sodium chloride 0.9%, sodium phosphate 15 mmol in dextrose 5 % (D5W) 250 mL IVPB, sodium phosphate 20.01 mmol in dextrose 5 % (D5W) 250 mL IVPB, sodium phosphate 30 mmol in dextrose 5 % (D5W) 250 mL IVPB    LABS AND DIAGNOSTICS     CBC LAST 3 DAYS  Recent Labs   Lab 11/05/23  0505 11/06/23  0401 11/06/23  0526 11/06/23  0930 11/07/23  0406 11/07/23  0443   WBC 15.68* 11.56  --   --  15.50*  --    RBC 3.11* 3.30*  --   --  3.31*  --    HGB 8.6* 8.8*  --   --  9.0*  --    HCT 29.8* 29.8*   < > 27* 29.8* 25*   MCV 96 90  --   --  90  --    MCH 27.7 26.7*  --   --  27.2  --    MCHC 28.9* 29.5*  --   --  30.2*  --    RDW 14.8* 15.2*  --   --  15.4*  --     185  --   --  238  --    MPV 12.4 11.9  --   --  12.1  --    GRAN 79.5*  12.5* 83.8*   "9.7*  --   --  83.1*  12.9*  --    LYMPH 7.8*  1.2 9.6*  1.1  --   --  9.2*  1.4  --    MONO 11.9  1.9* 5.1  0.6  --   --  5.2  0.8  --    BASO 0.03 0.03  --   --  0.03  --    NRBC 0 0  --   --  0  --     < > = values in this interval not displayed.         COAGULATION LAST 3 DAYS  No results for input(s): "LABPT", "INR", "APTT" in the last 168 hours.    CHEMISTRY LAST 3 DAYS  Recent Labs   Lab 11/05/23  0505 11/05/23  0812 11/06/23  0012 11/06/23  0401 11/06/23  0526 11/06/23  0930 11/07/23  0406 11/07/23  0443     144   < > 140 142  142  --   --  142  --    K 5.2*  5.2*   < > 4.8 4.7  4.7  --   --  4.2  --      101   < > 97 97  97  --   --  97  --    CO2 35*  35*   < > 38* 38*  38*  --   --  37*  --    ANIONGAP 8  8   < > 5* 7*  7*  --   --  8  --    BUN 43*  43*   < > 46* 45*  45*  --   --  45*  --    CREATININE 1.6*  1.6*   < > 1.8* 1.9*  1.9*  --   --  1.6*  --      106   < > 181* 155*  155*  --   --  82  --    CALCIUM 8.8  8.8   < > 8.6* 8.4*  8.4*  --   --  8.3*  --    PH  --    < >  --   --  7.481* 7.517*  --  7.477*   MG 2.3  --   --  2.2  --   --  2.2  --    ALBUMIN 2.9*  --   --  3.0*  --   --  3.0*  --    PROT 5.5*  --   --  5.7*  --   --  5.7*  --    ALKPHOS 57  --   --  59  --   --  67  --    ALT 9*  --   --  8*  --   --  8*  --    AST 15  --   --  15  --   --  14  --    BILITOT 0.3  --   --  0.3  --   --  0.3  --     < > = values in this interval not displayed.         CARDIAC PROFILE LAST 3 DAYS  Recent Labs   Lab 11/04/23  1057   BNP 1,995*         ENDOCRINE LAST 3 DAYS  Recent Labs   Lab 11/04/23  1057   PROCAL 0.149         LAST ARTERIAL BLOOD GAS  ABG  Recent Labs   Lab 11/07/23  0443   PH 7.477*   PO2 68*   PCO2 50.7*   HCO3 37.4*   BE 14*         LAST 7 DAYS MICROBIOLOGY   Microbiology Results (last 7 days)       Procedure Component Value Units Date/Time    Urine culture [[id number]] Collected: 11/04/23 1134    Order Status: Completed Specimen: Urine " from Clean Catch Updated: 11/06/23 1646     Urine Culture, Routine No growth    Blood Culture #2 **CANNOT BE ORDERED STAT** [[id number]] Collected: 11/04/23 1552    Order Status: Completed Specimen: Blood Updated: 11/06/23 1632     Blood Culture, Routine No Growth to date      No Growth to date      No Growth to date    Narrative:      Collection has been rescheduled by [facility] at 11/04/2023 12:28 Reason:   Unable to collect 2nd set  Collection has been rescheduled by [facility] at 11/04/2023 12:28 Reason:   Unable to collect 2nd set    Blood Culture #1 **CANNOT BE ORDERED STAT** [[id number]] Collected: 11/04/23 1204    Order Status: Completed Specimen: Blood Updated: 11/06/23 1432     Blood Culture, Routine No Growth to date      No Growth to date      No Growth to date    Culture, Respiratory with Gram Stain [[id number]]  (Abnormal)  (Susceptibility) Collected: 11/04/23 1122    Order Status: Completed Specimen: Respiratory from Sputum Updated: 11/06/23 0706     Respiratory Culture KLEBSIELLA PNEUMONIAE  Few       Gram Stain (Respiratory) <10 epithelial cells per low power field.     Gram Stain (Respiratory) Few WBC's     Gram Stain (Respiratory) Few Gram negative rods    MRSA Screen by PCR [[id number]]  (Abnormal) Collected: 11/04/23 1609    Order Status: Completed Specimen: Nasopharyngeal Swab from Nasal Updated: 11/04/23 1736     MRSA SCREEN BY PCR Positive     Comment: Positive MRSA by PCR called and verbal readback obtained from [facility], RN. by [facility] 11/04/2023 17:36         Narrative:      Positive MRSA by PCR called and verbal readback obtained from [facility], RN. by [facility] 11/04/2023 17:36    Urine Culture High Risk [[id number]]     Order Status: Completed Specimen: Urine             MOST RECENT IMAGING  X-Ray Chest AP Portable  Reason: Intubated.    FINDINGS:    Portable chest with comparison chest x-ray November 6, 2023 show normal cardiomediastinal silhouette. Endotracheal tube and nasogastric tube  again noted with out detrimental change.  Bilateral diffuse interstitial lung opacities with hazy opacification of the bilateral mid to lower lungs is unchanged. No pneumothorax. Pulmonary vasculature is normal. No acute osseous abnormality.    IMPRESSION:    No significant change from prior exam.    Electronically signed by:  Pasha Lee DO  11/07/2023 07:08 AM CST Workstation: [id number]      ECHOCARDIOGRAM RESULTS (last 5)  Results for orders placed during the hospital encounter of 08/25/23    Echo Saline Bubble? No    Interpretation Summary    Left Ventricle: The left ventricle is normal in size. Moderately increased ventricular mass. Moderately increased wall thickness. There is moderate concentrict hypertrophy. Normal wall motion. There is normal systolic function.  EF 61% There is normal diastolic function.    Left Atrium: Left atrium is moderately dilated.    Right Ventricle: Normal right ventricular cavity size. Wall thickness is normal. Right ventricle wall motion  is normal. Systolic function is normal.    Mitral Valve: Mildly thickened anterior leaflet. Mild mitral annular calcification.    IVC/SVC: Normal venous pressure at 3 mmHg.    Pericardium: There is an effusion.    Limited echo; no gross pulmonary hypertension but poor visualization of tricuspid signal    No tissue Doppler performed to assess mean left atrial pressure      Results for orders placed during the hospital encounter of 07/27/23    Echo    Interpretation Summary  · Normal systolic function.  · Normal left ventricular diastolic function.  · The estimated ejection fraction is 60%.  · Atrial fibrillation not observed.  · Normal right ventricular size with normal right ventricular systolic function.  · Mild left atrial enlargement.  · Aortic valve is calcified but there does not appear to be significant aortic stenosis-  · Aortic valve area is cm2; peak velocity is 1.47 m/s; mean gradient is 5 mmHg.  · Mild mitral regurgitation.  ·  Mild tricuspid regurgitation.  · Normal central venous pressure (3 mmHg).  · The estimated PA systolic pressure is 20 mmHg.      Results for orders placed during the hospital encounter of 05/25/23    Echo    Interpretation Summary  · The left ventricle is normal in size with concentric remodeling and normal systolic function.  · The estimated ejection fraction is 65%.  · Indeterminate left ventricular diastolic function.  · Mild to moderate tricuspid regurgitation.  · Mild pulmonic regurgitation.  · Normal right ventricular size with normal right ventricular systolic function.  · Normal central venous pressure (3 mmHg).  · The estimated PA systolic pressure is 32 mmHg.  · Mild mitral regurgitation.      Results for orders placed during the hospital encounter of 01/15/23    Echo    Interpretation Summary  · The left ventricle is normal in size with moderate concentric hypertrophy and normal systolic function.  · Sinus tachycardia heart rate 110  · The estimated ejection fraction is 70%.  · Indeterminate left ventricular diastolic function with normal left atrial pressure.  · Atrial fibrillation not observed.  · Normal right ventricular size with normal right ventricular systolic function.  · Mild mitral regurgitation.  · Normal central venous pressure (3 mmHg).      Results for orders placed during the hospital encounter of 04/08/22    Echo    Interpretation Summary  · Limited study for wall morgan. several off axis views.  · The estimated ejection fraction is 60%.  · The left ventricle is normal in size with normal systolic function.  · The following segments are hypokinetic: basal inferoseptal and basal inferior. All other segments are normal.  · Normal right ventricular size with normal right ventricular systolic function.      CURRENT/PREVIOUS VISIT EKG  Results for orders placed or performed during the hospital encounter of 11/04/23   EKG 12-lead    Collection Time: 11/05/23  8:43 PM    Narrative    Test Reason :  R07.9,    Vent. Rate : 130 BPM     Atrial Rate : 101 BPM     P-R Int : 000 ms          QRS Dur : 088 ms      QT Int : 340 ms       P-R-T Axes : 000 075 270 degrees     QTc Int : 500 ms    Atrial fibrillation with rapid ventricular response  Septal infarct (cited on or before 04-NOV-2023)  Abnormal ECG  When compared with ECG of 05-NOV-2023 20:39,  Atrial fibrillation has replaced Sinus rhythm  Nonspecific T wave abnormality has replaced inverted T waves in Inferior  leads    Referred By: [email or web address]   SELF           Confirmed By:            ASSESSMENT/PLAN:     Active Hospital Problems    Diagnosis    *Acute respiratory failure with hypoxia and hypercarbia    Atrial fibrillation    Pneumonia     COPD exacerbation    Acute on chronic heart failure with preserved ejection fraction (HFpEF)    Hyperkalemia    Leukocytosis    Chronic kidney disease, stage 4 (severe)    DM type 2, controlled, with complication    Essential hypertension, benign       ASSESSMENT & PLAN:   Acute respiratory failure  COPD exacerbation  Pneumonia  Elevated troponin  Acute on chronic HFpEF  HTN  CKD  DM      RECOMMENDATIONS:  Continue PO amiodarone 200 mg BID for PAF  Elevated troponin 2/2 multiple factors: CHF, Hypoxia, infection. Troponin has trended down  Continue aspirin, statin and plavix for history recent PCI (8/29/23). Denies chest pain  Continue IV lasix for HFpEF exacerbation. Strict I/O.   Blood pressure trend stable  Echocardiogram 8/26/23 showed Efx 60%    Arlin Murray NP  [facility]  Department of Cardiology  Date of Service: 11/07/2023        I have personally interviewed and examined the patient, I have reviewed the Nurse Practitioner's history and physical, assessment, and plan. I agree with the findings and plan.      Patient extubated and doing well.  The heart failure status improved    DR. Mundo Rodriges M.D.  [facility]  Department of Cardiology  Date of Service: 11/07/2023  9:41 AM

## 2023-11-07 NOTE — PROGRESS NOTES
"[facility] Medicine  Progress Note    Patient Name: Marisol Kerr  MRN: [id number]  Patient Class: IP- Inpatient   Admission Date: 11/4/2023  Length of Stay: 3 days  Attending Physician: Radha Maravilla MD  Primary Care Provider: Khadar Dwyer MD        Subjective:     Principal Problem:Acute respiratory failure with hypoxia and hypercarbia        HPI:  Marisol Kerr is a 78 y.o. White female   With PMH of HFpEF, COPD,   CAD, DM2, HTN,   who presents with SOB.     Pt currently intubated  Per EMS report, pt was found gasping for air  Said "help me, help me"  then went unresponsive  She was seated, no head trauma  Pt intubated before arrival to ER  No cardiac arrest occured     Pt was DNR, DNI + on hospice  but hospice + DNR/DNI has been revoked by her daughter      Overview/Hospital Course:  Patient admitted for acute hypercapnic respiratory failure secondary to COPD exacerbation.  Chest x-ray showed possible aspiration.  Patient was intubated before arrival to the emergency room.  Admitted to the ICU.  Pulmonology consulted.  Patient started on pressor support, given IV diuretics for concern of fluid overload.  Started on empiric IV antibiotics.  Steroids and inhalers.  Weaned off pressors.  Difficult weaning off of mechanical ventilation.  Sputum cultures were positive for Klebsiella pneumoniae.  Patient developed atrial fibrillation with rapid RVR, amiodarone drip was started.  Drip was discontinued patient was started on p.o. amiodarone.      Interval History:  Patient remains intubated, currently undergoing spontaneous breathing trial.  Creatinine improving, 1.6 today.    Review of Systems   Unable to perform ROS: Intubated     Objective:     Vital Signs (Most Recent):  Temp: 97.7 °F (36.5 °C) (11/07/23 0301)  Pulse: 110 (11/07/23 1230)  Resp: 19 (11/07/23 1230)  BP: 134/60 (11/07/23 0315)  SpO2: 96 % (11/07/23 1230) Vital Signs (24h Range):  Temp:  [97.4 °F (36.3 °C)-97.9 °F (36.6 °C)] " 97.7 °F (36.5 °C)  Pulse:  [] 110  Resp:  [12-32] 19  SpO2:  [92 %-100 %] 96 %  BP: (112-134)/(53-60) 134/60  Arterial Line BP: (-)/(-15-98) 115/98     Weight: 74.6 kg (164 lb 7.4 oz)  Body mass index is 29.13 kg/m².    Intake/Output Summary (Last 24 hours) at 11/7/2023 1319  Last data filed at 11/7/2023 0604  Gross per 24 hour   Intake 388.68 ml   Output 1950 ml   Net -1561.32 ml         Physical Exam  Vitals reviewed.   Constitutional:       Appearance: She is obese. She is ill-appearing.   HENT:      Head: Normocephalic and atraumatic.      Mouth/Throat:      Comments: Intubated  Eyes:      Extraocular Movements: Extraocular movements intact.      Pupils: Pupils are equal, round, and reactive to light.   Cardiovascular:      Rate and Rhythm: Regular rhythm. Tachycardia present.   Pulmonary:      Breath sounds: No wheezing.      Comments: Mechanical breath sounds  Abdominal:      General: Abdomen is flat.      Palpations: Abdomen is soft.   Neurological:      Comments: Sedated             Significant Labs: All pertinent labs within the past 24 hours have been reviewed.  CBC:   Recent Labs   Lab 11/06/23  0401 11/06/23  0526 11/06/23  0930 11/07/23  0406 11/07/23  0443   WBC 11.56  --   --  15.50*  --    HGB 8.8*  --   --  9.0*  --    HCT 29.8*   < > 27* 29.8* 25*     --   --  238  --     < > = values in this interval not displayed.     CMP:   Recent Labs   Lab 11/06/23  0012 11/06/23  0401 11/07/23  0406    142  142 142   K 4.8 4.7  4.7 4.2   CL 97 97  97 97   CO2 38* 38*  38* 37*   * 155*  155* 82   BUN 46* 45*  45* 45*   CREATININE 1.8* 1.9*  1.9* 1.6*   CALCIUM 8.6* 8.4*  8.4* 8.3*   PROT  --  5.7* 5.7*   ALBUMIN  --  3.0* 3.0*   BILITOT  --  0.3 0.3   ALKPHOS  --  59 67   AST  --  15 14   ALT  --  8* 8*   ANIONGAP 5* 7*  7* 8       Significant Imaging: I have reviewed all pertinent imaging results/findings within the past 24 hours.      Assessment/Plan:      * Acute  respiratory failure with hypoxia and hypercarbia  Patient with Hypercapnic Respiratory failure which is Acute on chronic.  she is on home oxygen at 8 LPM. Supplemental oxygen was provided and noted- Vent Mode: Spont  Oxygen Concentration (%):  [35] 35  Resp Rate Total:  [13 br/min-32 br/min] 24 br/min  Vt Set:  [350 mL] 350 mL  PEEP/CPAP:  [5 cmH20] 5 cmH20  Pressure Support:  [10 cmH20] 10 cmH20  Mean Airway Pressure:  [7.7 ufE63-74 cmH20] 8.4 cmH20    .   Signs/symptoms of respiratory failure include- increased work of breathing and respiratory distress. Contributing diagnoses includes - Aspiration and COPD Labs and images were reviewed. Patient Has recent ABG, which has been reviewed. Will treat underlying causes and adjust management of respiratory failure as follows- steroids, continue IV antibiotics, pulmonology consulted.  Daily weaning of mechanical ventilation.  Continue with inhalers.    Atrial fibrillation  No reported history of atrial fibrillation, EKG showed atrial fibrillation with rapid ventricular response  Cardiology notified, patient started amiodarone drip.  This was discontinued, amiodarone p.o. started.  Patient on full-dose Lovenox.    Pneumonia   Sputum cultures grew Klebsiella pneumoniae   discontinue vancomycin and cefepime, Rocephin started by pulmonology        COPD exacerbation  Patient's COPD is with exacerbation noted by continued dyspnea, use of accessory muscles for breathing and worsening of baseline hypoxia currently.  Patient is currently on COPD Pathway. Continue scheduled inhalers Steroids, Antibiotics and Supplemental oxygen and monitor respiratory status closely.     Patient with severe end-stage COPD, currently on home hospice.  On 8 L oxygen at home.  Discussions held with daughter about overall prognosis and difficulty weaning ventilation, daughter verbalized understanding.  We will continue with SBT trials daily.  Patient has been unsuccessful with SBT trials.  Discussions  held with pulmonology and daughter, likelihood of coming off the ventilator as low.  Patient code status DNR.  We will undergo SBT today.    Acute on chronic heart failure with preserved ejection fraction (HFpEF)  Patient is identified as having Diastolic (HFpEF) heart failure that is Acute on chronic. CHF is currently controlled. Latest ECHO performed and demonstrates- Results for orders placed during the hospital encounter of 08/25/23    Echo Saline Bubble? No    Interpretation Summary    Left Ventricle: The left ventricle is normal in size. Moderately increased ventricular mass. Moderately increased wall thickness. There is moderate concentrict hypertrophy. Normal wall motion. There is normal systolic function.  EF 61% There is normal diastolic function.    Left Atrium: Left atrium is moderately dilated.    Right Ventricle: Normal right ventricular cavity size. Wall thickness is normal. Right ventricle wall motion  is normal. Systolic function is normal.    Mitral Valve: Mildly thickened anterior leaflet. Mild mitral annular calcification.    IVC/SVC: Normal venous pressure at 3 mmHg.    Pericardium: There is an effusion.    Limited echo; no gross pulmonary hypertension but poor visualization of tricuspid signal    No tissue Doppler performed to assess mean left atrial pressure    We will continue with IV diuretics.  Chest x-ray shows improvement.  Discontinue dobutamine.  If pressors needed we will start Levophed.  Strict I's and O's.  Wean Lasix to daily from b.i.d..      Recent Labs   Lab 11/04/23  1057   BNP 1,995*   .    Hyperkalemia  Continue to monitor, patient currently receiving inhalers and Lasix  The patients latest potassium has been reviewed and the results are listed below  Recent Labs   Lab 11/07/23  0406   K 4.2           Leukocytosis  Likely secondary to aspiration pneumonia, patient also on steroids which can elevate white blood cells.  , reviewed CBC is white blood cells  "improving      Chronic kidney disease, stage 4 (severe)  Creatine stable for now. BMP reviewed- noted Estimated Creatinine Clearance: 28.9 mL/min (A) (based on SCr of 1.6 mg/dL (H)). according to latest data. Based on current GFR, CKD stage is stage 3 - GFR 30-59.  Monitor UOP and serial BMP and adjust therapy as needed. Renally dose meds. Avoid nephrotoxic medications and procedures.    DM type 2, controlled, with complication  Patient's FSGs are controlled on current medication regimen.  Last A1c reviewed-   Lab Results   Component Value Date    HGBA1C 5.0 08/25/2023     Most recent fingerstick glucose reviewed- No results for input(s): "POCTGLUCOSE" in the last 24 hours.  Current correctional scale  Low  Maintain anti-hyperglycemic dose as follows-   Antihyperglycemics (From admission, onward)    Start     Stop Route Frequency Ordered    11/04/23 1439  insulin aspart U-100 pen 0-10 Units         -- SubQ Every 6 hours PRN 11/04/23 1341        Hold Oral hypoglycemics while patient is in the hospital.    Essential hypertension, benign  Holding outpatient blood pressure medication given hypotension and pressor requirement   We will resume once blood pressure is more stable      VTE Risk Mitigation (From admission, onward)         Ordered     enoxaparin injection 70 mg  Every 24 hours         11/04/23 1641     IP VTE HIGH RISK PATIENT  Once         11/04/23 1341     Place sequential compression device  Until discontinued         11/04/23 1341                Discharge Planning   LUZ:      Code Status: DNR   Is the patient medically ready for discharge?:     Reason for patient still in hospital (select all that apply): Patient unstable  Discharge Plan A: New Nursing Home placement - nursing home care facility          Radha Maravilla MD  Department of Hospital Medicine   [facility]  "

## 2023-11-07 NOTE — ASSESSMENT & PLAN NOTE
Creatine stable for now. BMP reviewed- noted Estimated Creatinine Clearance: 28.9 mL/min (A) (based on SCr of 1.6 mg/dL (H)). according to latest data. Based on current GFR, CKD stage is stage 3 - GFR 30-59.  Monitor UOP and serial BMP and adjust therapy as needed. Renally dose meds. Avoid nephrotoxic medications and procedures.

## 2023-11-07 NOTE — SUBJECTIVE & OBJECTIVE
Interval History:  Patient remains intubated, currently undergoing spontaneous breathing trial.  Creatinine improving, 1.6 today.    Review of Systems   Unable to perform ROS: Intubated     Objective:     Vital Signs (Most Recent):  Temp: 97.7 °F (36.5 °C) (11/07/23 0301)  Pulse: 110 (11/07/23 1230)  Resp: 19 (11/07/23 1230)  BP: 134/60 (11/07/23 0315)  SpO2: 96 % (11/07/23 1230) Vital Signs (24h Range):  Temp:  [97.4 °F (36.3 °C)-97.9 °F (36.6 °C)] 97.7 °F (36.5 °C)  Pulse:  [] 110  Resp:  [12-32] 19  SpO2:  [92 %-100 %] 96 %  BP: (112-134)/(53-60) 134/60  Arterial Line BP: (-)/(-15-98) 115/98     Weight: 74.6 kg (164 lb 7.4 oz)  Body mass index is 29.13 kg/m².    Intake/Output Summary (Last 24 hours) at 11/7/2023 1319  Last data filed at 11/7/2023 0604  Gross per 24 hour   Intake 388.68 ml   Output 1950 ml   Net -1561.32 ml         Physical Exam  Vitals reviewed.   Constitutional:       Appearance: She is obese. She is ill-appearing.   HENT:      Head: Normocephalic and atraumatic.      Mouth/Throat:      Comments: Intubated  Eyes:      Extraocular Movements: Extraocular movements intact.      Pupils: Pupils are equal, round, and reactive to light.   Cardiovascular:      Rate and Rhythm: Regular rhythm. Tachycardia present.   Pulmonary:      Breath sounds: No wheezing.      Comments: Mechanical breath sounds  Abdominal:      General: Abdomen is flat.      Palpations: Abdomen is soft.   Neurological:      Comments: Sedated             Significant Labs: All pertinent labs within the past 24 hours have been reviewed.  CBC:   Recent Labs   Lab 11/06/23  0401 11/06/23  0526 11/06/23  0930 11/07/23  0406 11/07/23  0443   WBC 11.56  --   --  15.50*  --    HGB 8.8*  --   --  9.0*  --    HCT 29.8*   < > 27* 29.8* 25*     --   --  238  --     < > = values in this interval not displayed.     CMP:   Recent Labs   Lab 11/06/23  0012 11/06/23  0401 11/07/23  0406    142  142 142   K 4.8 4.7  4.7 4.2    CL 97 97  97 97   CO2 38* 38*  38* 37*   * 155*  155* 82   BUN 46* 45*  45* 45*   CREATININE 1.8* 1.9*  1.9* 1.6*   CALCIUM 8.6* 8.4*  8.4* 8.3*   PROT  --  5.7* 5.7*   ALBUMIN  --  3.0* 3.0*   BILITOT  --  0.3 0.3   ALKPHOS  --  59 67   AST  --  15 14   ALT  --  8* 8*   ANIONGAP 5* 7*  7* 8       Significant Imaging: I have reviewed all pertinent imaging results/findings within the past 24 hours.

## 2023-11-07 NOTE — CARE UPDATE
11/06/23 2020   Patient Assessment/Suction   Level of Consciousness (AVPU) responds to pain   Respiratory Effort Unlabored   Expansion/Accessory Muscles/Retractions no use of accessory muscles   All Lung Fields Breath Sounds clear;diminished   Rhythm/Pattern, Respiratory assisted mechanically   Cough Frequency with stimulation   Cough Type assisted   Suction Method tracheal;oral   $ Suction Charges Inline Suction Procedure Stat Charge   Secretions Amount small   Secretions Color white   Secretions Characteristics thin   PRE-TX-O2   Device (Oxygen Therapy) ventilator   $ Is the patient on Low Flow Oxygen? Yes   Oxygen Concentration (%) 35   SpO2 100 %   Pulse Oximetry Type Continuous   $ Pulse Oximetry - Multiple Charge Pulse Oximetry - Multiple   Pulse 68   Resp (!) 22   Positioning   Head of Bed (HOB) Positioning HOB elevated;HOB at 30-45 degrees   Aerosol Therapy   $ Aerosol Therapy Charges Aerosol Treatment   Daily Review of Necessity (SVN) completed   Respiratory Treatment Status (SVN) given   Treatment Route (SVN) in-line   Patient Position (SVN) semi-Solorzano's   Post Treatment Assessment (SVN) breath sounds improved   Signs of Intolerance (SVN) none   Breath Sounds Post-Respiratory Treatment   Throughout All Fields Post-Treatment All Fields   Throughout All Fields Post-Treatment aeration increased   Post-treatment Heart Rate (beats/min) 66   Post-treatment Resp Rate (breaths/min) 28        Airway - Non-Surgical 11/06/23 1210 Endotracheal Tube-Hi/Lo   Placement Date/Time: 11/06/23 1210   Present Prior to Hospital Arrival?: No  Method of Intubation: Todd  Inserted by: MD  Staff/Resident Name(s): Dr. Li  Airway Device: Endotracheal Tube-Hi/Lo  Mask Ventilation: Easy  Style: Cuffed  Cuff Inflati...   Secured at 23 cm   Measured At Lips   Secured Location Right   Secured by Commercial tube collins   Position of ETT in xRay In good position   Site Condition Cool;Dry   Status Intact;Secured   Site Assessment  Clean;Dry   Vent Select   Conventional Vent Y   Charged w/in last 24h YES   Preset Conventional Ventilator Settings   Vent Type    Ventilation Type VC   Vent Mode A/C   Humidity HME   Set Rate 18 BPM   Vt Set 350 mL   PEEP/CPAP 5 cmH20   Peak Flow 60 L/min   Peak End Inspiratory Pressure 13 cmH20   I-Trigger Type  V-TRIG   Trigger Sensitivity Flow/I-Trigger 3 L/min   Patient Ventilator Parameters   Resp Rate Total 18 br/min   Peak Airway Pressure 25 cmH20   Mean Airway Pressure 8.5 cmH20   Plateau Pressure 0 cmH20   Exhaled Vt 377 mL   Total Ve 6.59 L/m   I:E Ratio Measured 1:4.20   Auto PEEP 0 cmH20   Tubing ID (mm) 7.5 mm   Tube Type ET   Conventional Ventilator Alarms   Alarms On Y   Resp Rate High Alarm 50 br/min   Press High Alarm 60 cmH2O   Apnea Rate 10   Apnea Volume (mL) 0 mL   Apnea Oxygen Concentration  100   Apnea Flow Rate (L/min) 44   T Apnea 20 sec(s)   Ready to Wean/Extubation Screen   FIO2<=50 (chart decimal) 0.35   MV<16L (chart vol.) 6.59   PEEP <=8 (chart #) 5   Ready to Wean Parameters   F/VT Ratio<105 (RSBI) (!) 58.36   Vital Capacity   Vital Capacity (mL) 0   Respiratory Evaluation   $ Care Plan Tech Time 15 min   $ Eval/Re-eval Charges Re-evaluation

## 2023-11-07 NOTE — PROGRESS NOTES
Progress Note  PULMONARY    Admit Date: 11/4/2023 11/07/2023  History of Present Illness:  Pt is a 77 yo female with end stage COPD, chronic resp failure on 8L home O2 who is on hospice. She called EMS and was in distress then became unresponsive. She was found to be hypercapneic intubated in ED before her hospice status was known. Her daughter is at bedside and wishes to continue vent support for now hoping she may improve.  Pt has been at home on hospice but daughter states she wants her to go to NH as she hasn't been doing well at home.    SUBJECTIVE:     11/5- continues on vent, with SAT/SBT pt wakes and follows commands but tachypneic, tachycardic and hypertensive so failed SBT. Daughter at bedside actively participating. Pt hard of hearing and has her hearing aid R ear    11/6 patient lasted 15 minutes on her sedation vacation and spontaneous breathing trial this morning.  I do not have an ABG.  That is being corrected.  The patient was on hospice.  I have spoken with her daughter who states EMS was called 1st, not hospice.  She was intubated before hospice was ever notified.  The daughter would like to give her 4-7 days on the ventilator and see if she can improve.  I told her that I am not hopeful of this.  The patient was on 8 L of oxygen at home.    11/7 the patient failed her sedation vacation.  The nurse reports 5 minutes, the Respiratory therapist reports 20.  We will repeat it.        OBJECTIVE:     Vitals (Most recent):  Vitals:    11/07/23 0645   BP:    Pulse: 67   Resp: (!) 22   Temp:        Vitals (24 hour range):  Temp:  [97.2 °F (36.2 °C)-97.9 °F (36.6 °C)]   Pulse:  []   Resp:  [12-34]   BP: (106-167)/(50-78)   SpO2:  [96 %-100 %]   Arterial Line BP: ()/(12-82)       Intake/Output Summary (Last 24 hours) at 11/7/2023 0819  Last data filed at 11/7/2023 0604  Gross per 24 hour   Intake 1422.78 ml   Output 1950 ml   Net -527.22 ml          PHYSICAL EXAM  GENERAL: Older patient in no  distress, sedated on the ventilator.  HEENT: Pupils equal and reactive. Extraocular movements intact. Nose with decubitus on the tip of it from pressure from the NG tube.  Pharynx intubated with 7.5 ET tube and OG tube.  NECK: Supple.   HEART: Regular rate and rhythm. No murmur or gallop auscultated.  LUNGS:  There are clear anteriorly and laterally.  Lung excursion symmetrical. No change in fremitus.   ABDOMEN: Bowel sounds present. Non-tender, no masses palpated.  : Normal anatomy.  Montes with yellow urine  EXTREMITIES: Normal muscle tone and joint movement, no cyanosis or clubbing.  The heels are looking better.  There is an eschar over the 3rd toe on the right foot.  Right midline, left arterial line  LYMPHATICS: No adenopathy palpated, tr edema.  SKIN: Dry multiple abrasions and eschars, decubitus over her coccyx as well, left heel is still erythematous  NEURO:  Sedated  PSYCH:  Unable to assess      Radiographs reviewed: view by direct vision   Chest x-ray 11/07/2023  Portable chest with comparison chest x-ray November 6, 2023 show normal cardiomediastinal silhouette. Endotracheal tube and nasogastric tube again noted with out detrimental change.  Bilateral diffuse interstitial lung opacities with hazy opacification of the bilateral mid to lower lungs is unchanged. No pneumothorax. Pulmonary vasculature is normal. No acute osseous abnormality.       TTE 8/26/23-     Left Ventricle: The left ventricle is normal in size. Moderately increased ventricular mass. Moderately increased wall thickness. There is moderate concentrict hypertrophy. Normal wall motion. There is normal systolic function.  EF 61% There is normal diastolic function.    Left Atrium: Left atrium is moderately dilated.    Right Ventricle: Normal right ventricular cavity size. Wall thickness is normal. Right ventricle wall motion  is normal. Systolic function is normal.    Mitral Valve: Mildly thickened anterior leaflet. Mild mitral annular  calcification.    IVC/SVC: Normal venous pressure at 3 mmHg.    Pericardium: There is an effusion.    Limited echo; no gross pulmonary hypertension but poor visualization of tricuspid signal    No tissue Doppler performed to assess mean left atrial pressure    Labs     Recent Labs   Lab 11/07/23 0406 11/07/23 0443   WBC 15.50*  --    HGB 9.0*  --    HCT 29.8* 25*     --      Recent Labs   Lab 11/07/23 0406      K 4.2   CL 97   CO2 37*   BUN 45*   CREATININE 1.6*   GLU 82   CALCIUM 8.3*   MG 2.2   PHOS 5.0*   AST 14   ALT 8*   ALKPHOS 67   BILITOT 0.3   PROT 5.7*   ALBUMIN 3.0*     Recent Labs   Lab 11/07/23 0443   PH 7.477*   PCO2 50.7*   PO2 68*   HCO3 37.4*   ABG on assist control rate of 18, tidal volume 350, FiO2 35%, peep of 5  Microbiology Results (last 7 days)       Procedure Component Value Units Date/Time    Urine culture [[id number]] Collected: 11/04/23 1134    Order Status: Completed Specimen: Urine from Clean Catch Updated: 11/06/23 1646     Urine Culture, Routine No growth    Blood Culture #2 **CANNOT BE ORDERED STAT** [[id number]] Collected: 11/04/23 1552    Order Status: Completed Specimen: Blood Updated: 11/06/23 1632     Blood Culture, Routine No Growth to date      No Growth to date      No Growth to date    Narrative:      Collection has been rescheduled by [facility] at 11/04/2023 12:28 Reason:   Unable to collect 2nd set  Collection has been rescheduled by [facility] at 11/04/2023 12:28 Reason:   Unable to collect 2nd set    Blood Culture #1 **CANNOT BE ORDERED STAT** [[id number]] Collected: 11/04/23 1204    Order Status: Completed Specimen: Blood Updated: 11/06/23 1432     Blood Culture, Routine No Growth to date      No Growth to date      No Growth to date    Culture, Respiratory with Gram Stain [[id number]]  (Abnormal)  (Susceptibility) Collected: 11/04/23 1122    Order Status: Completed Specimen: Respiratory from Sputum Updated: 11/06/23 0706     Respiratory Culture KLEBSIELLA  PNEUMONIAE  Few       Gram Stain (Respiratory) <10 epithelial cells per low power field.     Gram Stain (Respiratory) Few WBC's     Gram Stain (Respiratory) Few Gram negative rods    MRSA Screen by PCR [[id number]]  (Abnormal) Collected: 11/04/23 1609    Order Status: Completed Specimen: Nasopharyngeal Swab from Nasal Updated: 11/04/23 1736     MRSA SCREEN BY PCR Positive     Comment: Positive MRSA by PCR called and verbal readback obtained from Ines Alonzo, ICU, RN. by [facility] 11/04/2023 17:36         Narrative:      Positive MRSA by PCR called and verbal readback obtained from Ines Alonzo, ICU, RN. by [facility] 11/04/2023 17:36    Urine Culture High Risk [[id number]]     Order Status: Completed Specimen: Urine             Impression:  Active Hospital Problems    Diagnosis  POA    *Acute respiratory failure with hypoxia and hypercarbia [J96.01, J96.02]  Yes    Atrial fibrillation [I48.91]  No    Pneumonia  [J18.9]  Yes    COPD exacerbation [J44.1]  Yes    Acute on chronic heart failure with preserved ejection fraction (HFpEF) [I50.33]  Yes    Hyperkalemia [E87.5]  Yes    Leukocytosis [D72.829]  Yes    Chronic kidney disease, stage 4 (severe) [N18.4]  Yes    DM type 2, controlled, with complication [E11.8]  Yes    Essential hypertension, benign [I10]  Yes      Resolved Hospital Problems   No resolved problems to display.           Assessment/Plan:     Acute on chronic hypercapneic/hypoxemic respiratory failure with mechanical ventilation  - on 8 L of oxygen at home  - failing SBT each morning  - adequate oxygenation on 35%  - alkalosis, will decrease rate to 12  - repeat spontaneous breathing trial this morning  Acute on chronic diastolic CHF  Bilateral pleural effusions  - on Lasix IV, creatinine better this morning  COPD with acute exacerbation  - no wheezing auscultated  - move DuoNebs to q.6  - continue Solu-Medrol  Right lower lobe pneumonia  - Klebsiella pneumoniae  - change cefepime to Rocephin  Acute on chronic  renal failure  - creatinine better today  Atrial fibrillation  - on amiodarone  Coronary artery disease  - recent stents  Anemia  - chronic disease  - hemoglobin rising with diuresis  Hyperglycemia and diabetes mellitus  Troponin leak  Mild hypoalbuminemia  Decubiti to heels  - offload pressure to heels  - right heel looks normal again, left heel is still a little red  - sacral decubitus is also present  Leukocytosis  - a bit higher today on steroids and Klebsiella pneumoniae      Discussed the situation with her daughter.  Her daughter knows that her likelihood of coming off the ventilator is low, but would like to give her a chance.  She does not wish for CPR or other aggressive interventions.  But, she would like to continue mechanical ventilation for 4-7 days to give her a chance.    On full-dose enoxaparin for possible PE   Protonix for GI prophylaxis      Critical care time spent reviewing the chart, examining the patient, reviewing the labs, reviewing the radiological findings, discussing care with nursing, physicians, and respiratory and creating the note and  has been greater than 35 minutes.

## 2023-11-07 NOTE — ASSESSMENT & PLAN NOTE
Patient with Hypercapnic Respiratory failure which is Acute on chronic.  she is on home oxygen at 8 LPM. Supplemental oxygen was provided and noted- Vent Mode: Spont  Oxygen Concentration (%):  [35] 35  Resp Rate Total:  [13 br/min-32 br/min] 24 br/min  Vt Set:  [350 mL] 350 mL  PEEP/CPAP:  [5 cmH20] 5 cmH20  Pressure Support:  [10 cmH20] 10 cmH20  Mean Airway Pressure:  [7.7 hcB66-08 cmH20] 8.4 cmH20    .   Signs/symptoms of respiratory failure include- increased work of breathing and respiratory distress. Contributing diagnoses includes - Aspiration and COPD Labs and images were reviewed. Patient Has recent ABG, which has been reviewed. Will treat underlying causes and adjust management of respiratory failure as follows- steroids, continue IV antibiotics, pulmonology consulted.  Daily weaning of mechanical ventilation.  Continue with inhalers.

## 2023-11-07 NOTE — ASSESSMENT & PLAN NOTE
Patient's COPD is with exacerbation noted by continued dyspnea, use of accessory muscles for breathing and worsening of baseline hypoxia currently.  Patient is currently on COPD Pathway. Continue scheduled inhalers Steroids, Antibiotics and Supplemental oxygen and monitor respiratory status closely.     Patient with severe end-stage COPD, currently on home hospice.  On 8 L oxygen at home.  Discussions held with daughter about overall prognosis and difficulty weaning ventilation, daughter verbalized understanding.  We will continue with SBT trials daily.  Patient has been unsuccessful with SBT trials.  Discussions held with pulmonology and daughter, likelihood of coming off the ventilator as low.  Patient code status DNR.  We will undergo SBT today.

## 2023-11-07 NOTE — CARE UPDATE
Tient suctioned 0946 and [facility]ated to [facility]   11/07/23 0943   Patient Assessment/Suction   Level of Consciousness (AVPU) responds to voice   Respiratory Effort Normal;Unlabored   Expansion/Accessory Muscles/Retractions no use of accessory muscles;expansion symmetric   All Lung Fields Breath Sounds clear;diminished   Rhythm/Pattern, Respiratory assisted mechanically   Cough Frequency with stimulation   Cough Type assisted   Suction Method tracheal;oral   $ Suction Charges Inline Suction Procedure Stat Charge   Secretions Amount small   Secretions Color white;red-streaked   Secretions Characteristics thin   Vent Select   Conventional Vent Y   Charged w/in last 24h YES   Preset Conventional Ventilator Settings   Vent ID [id number]   Vent Type    Humidity HME   Conventional Ventilator Alarms   Alarms On Y   IHI Ventilator Associated Pneumonia Bundle (Required)   Daily Awakening Trials Performed Yes   Daily Assessment of Readiness to Extubate Yes   Head of Bed Elevated  HOB 45   Oral Care Lip moisturizer applied   Peptic Ulcer ProphylaxisProvided Peptic ulcer prophylaxis maintained   Vent Circut Breaks Minimized Yes   VTE Prophylaxis Provided VTE prophylaxis initiated   Ready to Wean Parameters   Extubated? Yes   Reason for Extubation Extubated   Ventilator Discontinued Yes

## 2023-11-07 NOTE — ASSESSMENT & PLAN NOTE
Patient is identified as having Diastolic (HFpEF) heart failure that is Acute on chronic. CHF is currently controlled. Latest ECHO performed and demonstrates- Results for orders placed during the hospital encounter of 08/25/23    Echo Saline Bubble? No    Interpretation Summary    Left Ventricle: The left ventricle is normal in size. Moderately increased ventricular mass. Moderately increased wall thickness. There is moderate concentrict hypertrophy. Normal wall motion. There is normal systolic function.  EF 61% There is normal diastolic function.    Left Atrium: Left atrium is moderately dilated.    Right Ventricle: Normal right ventricular cavity size. Wall thickness is normal. Right ventricle wall motion  is normal. Systolic function is normal.    Mitral Valve: Mildly thickened anterior leaflet. Mild mitral annular calcification.    IVC/SVC: Normal venous pressure at 3 mmHg.    Pericardium: There is an effusion.    Limited echo; no gross pulmonary hypertension but poor visualization of tricuspid signal    No tissue Doppler performed to assess mean left atrial pressure    We will continue with IV diuretics.  Chest x-ray shows improvement.  Discontinue dobutamine.  If pressors needed we will start Levophed.  Strict I's and O's.  Wean Lasix to daily from b.i.d..      Recent Labs   Lab 11/04/23  1057   BNP 1,995*   .

## 2023-11-07 NOTE — CARE UPDATE
Continue current settings on ventilator with tx   11/07/23 0645   Patient Assessment/Suction   Level of Consciousness (AVPU) responds to pain   Respiratory Effort Normal;Unlabored   Expansion/Accessory Muscles/Retractions no use of accessory muscles;expansion symmetric   All Lung Fields Breath Sounds clear;diminished   WILMER Breath Sounds clear   LLL Breath Sounds diminished   RUL Breath Sounds clear   RML Breath Sounds diminished   RLL Breath Sounds diminished   Rhythm/Pattern, Respiratory assisted mechanically   Cough Frequency with stimulation   Cough Type assisted   Suction Method oral;tracheal   Suction Pressure (mmHg) -120 mmHg  (mmhg)   $ Suction Charges Inline Suction Procedure Stat Charge   Secretions Amount small   Secretions Color white;red-streaked   Secretions Characteristics thin   Skin Integrity   $ Wound Care Tech Time 15 min   Area Observed Upper lip;Lower lip;Corner lip   Skin Appearance without discoloration   PRE-TX-O2   Device (Oxygen Therapy) ventilator   $ Is the patient on Low Flow Oxygen? Yes   SpO2 99 %   Pulse Oximetry Type Continuous   $ Pulse Oximetry - Multiple Charge Pulse Oximetry - Multiple   Pulse 67   Resp (!) 22   Aerosol Therapy   $ Aerosol Therapy Charges Aerosol Treatment   Daily Review of Necessity (SVN) completed   Respiratory Treatment Status (SVN) given   Treatment Route (SVN) in-line;ventilator   Patient Position (SVN) semi-Solorzano's   Post Treatment Assessment (SVN) breath sounds improved;increased aeration   Signs of Intolerance (SVN) none   Breath Sounds Post-Respiratory Treatment   Throughout All Fields Post-Treatment All Fields   Throughout All Fields Post-Treatment aeration increased   Post-treatment Heart Rate (beats/min) 62   Post-treatment Resp Rate (breaths/min) 22        Airway - Non-Surgical 11/06/23 1210 Endotracheal Tube-Hi/Lo   Placement Date/Time: 11/06/23 1210   Present Prior to Hospital Arrival?: No  Method of Intubation: Todd  Inserted by: MD   Staff/Resident Name(s): Dr. iL  Airway Device: Endotracheal Tube-Hi/Lo  Mask Ventilation: Easy  Style: Cuffed  Cuff Inflati...   Secured at 23 cm   Measured At Lips   Secured Location Left   Secured by Commercial tube collins   Position of ETT in xRay In good position   Site Condition Cool;Dry   Status Intact;Secured;Patent   Site Assessment Clean;Dry;No bleeding   Education   $ Education Bronchodilator;15 min   Respiratory Evaluation   $ Care Plan Tech Time 15 min   $ Eval/Re-eval Charges Re-evaluation

## 2023-11-08 LAB
ALBUMIN SERPL BCP-MCNC: 2.9 G/DL (ref 3.5–5.2)
ALLENS TEST: ABNORMAL
ALP SERPL-CCNC: 61 U/L (ref 55–135)
ALT SERPL W/O P-5'-P-CCNC: 9 U/L (ref 10–44)
ANION GAP SERPL CALC-SCNC: 9 MMOL/L (ref 8–16)
AST SERPL-CCNC: 17 U/L (ref 10–40)
BASOPHILS # BLD AUTO: 0.03 K/UL (ref 0–0.2)
BASOPHILS NFR BLD: 0.3 % (ref 0–1.9)
BILIRUB SERPL-MCNC: 0.4 MG/DL (ref 0.1–1)
BUN SERPL-MCNC: 41 MG/DL (ref 8–23)
CALCIUM SERPL-MCNC: 8.1 MG/DL (ref 8.7–10.5)
CHLORIDE SERPL-SCNC: 100 MMOL/L (ref 95–110)
CO2 SERPL-SCNC: 37 MMOL/L (ref 23–29)
CREAT SERPL-MCNC: 1.6 MG/DL (ref 0.5–1.4)
DELSYS: ABNORMAL
DIFFERENTIAL METHOD: ABNORMAL
EOSINOPHIL # BLD AUTO: 0 K/UL (ref 0–0.5)
EOSINOPHIL NFR BLD: 0.4 % (ref 0–8)
EP: 6
ERYTHROCYTE [DISTWIDTH] IN BLOOD BY AUTOMATED COUNT: 15.8 % (ref 11.5–14.5)
ERYTHROCYTE [SEDIMENTATION RATE] IN BLOOD BY WESTERGREN METHOD: 12 MM/H
EST. GFR  (NO RACE VARIABLE): 33.2 ML/MIN/1.73 M^2
FIO2: 30
GLUCOSE SERPL-MCNC: 133 MG/DL (ref 70–110)
GLUCOSE SERPL-MCNC: 63 MG/DL (ref 70–110)
GLUCOSE SERPL-MCNC: 65 MG/DL (ref 70–110)
GLUCOSE SERPL-MCNC: 66 MG/DL (ref 70–110)
GLUCOSE SERPL-MCNC: 68 MG/DL (ref 70–110)
GLUCOSE SERPL-MCNC: 71 MG/DL (ref 70–110)
GLUCOSE SERPL-MCNC: 82 MG/DL (ref 70–110)
HCO3 UR-SCNC: 38.6 MMOL/L (ref 24–28)
HCT VFR BLD AUTO: 28.5 % (ref 37–48.5)
HCT VFR BLD CALC: 25 %PCV (ref 36–54)
HGB BLD-MCNC: 8.4 G/DL (ref 12–16)
IMM GRANULOCYTES # BLD AUTO: 0.06 K/UL (ref 0–0.04)
IMM GRANULOCYTES NFR BLD AUTO: 0.5 % (ref 0–0.5)
IP: 12
LYMPHOCYTES # BLD AUTO: 1.2 K/UL (ref 1–4.8)
LYMPHOCYTES NFR BLD: 10.6 % (ref 18–48)
MAGNESIUM SERPL-MCNC: 2.3 MG/DL (ref 1.6–2.6)
MCH RBC QN AUTO: 26.7 PG (ref 27–31)
MCHC RBC AUTO-ENTMCNC: 29.5 G/DL (ref 32–36)
MCV RBC AUTO: 91 FL (ref 82–98)
MIN VOL: 10.6
MODE: ABNORMAL
MONOCYTES # BLD AUTO: 0.7 K/UL (ref 0.3–1)
MONOCYTES NFR BLD: 6.3 % (ref 4–15)
NEUTROPHILS # BLD AUTO: 9.2 K/UL (ref 1.8–7.7)
NEUTROPHILS NFR BLD: 81.9 % (ref 38–73)
NRBC BLD-RTO: 0 /100 WBC
PCO2 BLDA: 52.4 MMHG (ref 35–45)
PH SMN: 7.47 [PH] (ref 7.35–7.45)
PHOSPHATE SERPL-MCNC: 4.2 MG/DL (ref 2.7–4.5)
PLATELET # BLD AUTO: 251 K/UL (ref 150–450)
PMV BLD AUTO: 12.1 FL (ref 9.2–12.9)
PO2 BLDA: 75 MMHG (ref 80–100)
POC BE: 15 MMOL/L
POC IONIZED CALCIUM: 1.1 MMOL/L (ref 1.06–1.42)
POC PCO2 TEMP: 51.6 MMHG
POC PH TEMP: 7.48
POC PO2 TEMP: 73 MMHG
POC SATURATED O2: 95 % (ref 95–100)
POC TCO2: 40 MMOL/L (ref 23–27)
POC TEMPERATURE: ABNORMAL
POTASSIUM BLD-SCNC: 3.5 MMOL/L (ref 3.5–5.1)
POTASSIUM SERPL-SCNC: 3.8 MMOL/L (ref 3.5–5.1)
PROT SERPL-MCNC: 5.4 G/DL (ref 6–8.4)
RBC # BLD AUTO: 3.15 M/UL (ref 4–5.4)
SAMPLE: ABNORMAL
SITE: ABNORMAL
SODIUM BLD-SCNC: 144 MMOL/L (ref 136–145)
SODIUM SERPL-SCNC: 146 MMOL/L (ref 136–145)
SP02: 99
SPONT RATE: 22
WBC # BLD AUTO: 11.26 K/UL (ref 3.9–12.7)

## 2023-11-08 PROCEDURE — 83735 ASSAY OF MAGNESIUM: CPT | Performed by: FAMILY MEDICINE

## 2023-11-08 PROCEDURE — 99900035 HC TECH TIME PER 15 MIN (STAT)

## 2023-11-08 PROCEDURE — 20000000 HC ICU ROOM

## 2023-11-08 PROCEDURE — 99900031 HC PATIENT EDUCATION (STAT)

## 2023-11-08 PROCEDURE — 27000221 HC OXYGEN, UP TO 24 HOURS

## 2023-11-08 PROCEDURE — 94761 N-INVAS EAR/PLS OXIMETRY MLT: CPT

## 2023-11-08 PROCEDURE — 63600175 PHARM REV CODE 636 W HCPCS: Performed by: INTERNAL MEDICINE

## 2023-11-08 PROCEDURE — 25000003 PHARM REV CODE 250: Performed by: NURSE PRACTITIONER

## 2023-11-08 PROCEDURE — 37799 UNLISTED PX VASCULAR SURGERY: CPT

## 2023-11-08 PROCEDURE — 99291 CRITICAL CARE FIRST HOUR: CPT | Mod: ,,, | Performed by: INTERNAL MEDICINE

## 2023-11-08 PROCEDURE — 25000003 PHARM REV CODE 250: Performed by: FAMILY MEDICINE

## 2023-11-08 PROCEDURE — 85025 COMPLETE CBC W/AUTO DIFF WBC: CPT | Performed by: FAMILY MEDICINE

## 2023-11-08 PROCEDURE — 93010 EKG 12-LEAD: ICD-10-PCS | Mod: ,,, | Performed by: INTERNAL MEDICINE

## 2023-11-08 PROCEDURE — 94640 AIRWAY INHALATION TREATMENT: CPT

## 2023-11-08 PROCEDURE — 80053 COMPREHEN METABOLIC PANEL: CPT | Performed by: FAMILY MEDICINE

## 2023-11-08 PROCEDURE — 82803 BLOOD GASES ANY COMBINATION: CPT

## 2023-11-08 PROCEDURE — 63600175 PHARM REV CODE 636 W HCPCS: Performed by: STUDENT IN AN ORGANIZED HEALTH CARE EDUCATION/TRAINING PROGRAM

## 2023-11-08 PROCEDURE — 99291 PR CRITICAL CARE, E/M 30-74 MINUTES: ICD-10-PCS | Mod: ,,, | Performed by: INTERNAL MEDICINE

## 2023-11-08 PROCEDURE — 93010 ELECTROCARDIOGRAM REPORT: CPT | Mod: ,,, | Performed by: INTERNAL MEDICINE

## 2023-11-08 PROCEDURE — 94660 CPAP INITIATION&MGMT: CPT

## 2023-11-08 PROCEDURE — 94760 N-INVAS EAR/PLS OXIMETRY 1: CPT

## 2023-11-08 PROCEDURE — 63600175 PHARM REV CODE 636 W HCPCS: Performed by: FAMILY MEDICINE

## 2023-11-08 PROCEDURE — 84100 ASSAY OF PHOSPHORUS: CPT | Performed by: FAMILY MEDICINE

## 2023-11-08 PROCEDURE — 25000003 PHARM REV CODE 250: Performed by: INTERNAL MEDICINE

## 2023-11-08 PROCEDURE — C9113 INJ PANTOPRAZOLE SODIUM, VIA: HCPCS | Performed by: FAMILY MEDICINE

## 2023-11-08 PROCEDURE — 92610 EVALUATE SWALLOWING FUNCTION: CPT

## 2023-11-08 PROCEDURE — 25000242 PHARM REV CODE 250 ALT 637 W/ HCPCS: Performed by: INTERNAL MEDICINE

## 2023-11-08 PROCEDURE — 93005 ELECTROCARDIOGRAM TRACING: CPT | Performed by: INTERNAL MEDICINE

## 2023-11-08 RX ORDER — PREDNISONE 20 MG/1
40 TABLET ORAL DAILY
Status: DISCONTINUED | OUTPATIENT
Start: 2023-11-09 | End: 2023-11-10

## 2023-11-08 RX ORDER — NITROGLYCERIN 0.4 MG/1
0.4 TABLET SUBLINGUAL EVERY 5 MIN PRN
Status: DISCONTINUED | OUTPATIENT
Start: 2023-11-08 | End: 2023-11-13 | Stop reason: HOSPADM

## 2023-11-08 RX ORDER — FUROSEMIDE 20 MG/1
20 TABLET ORAL DAILY
Status: DISCONTINUED | OUTPATIENT
Start: 2023-11-09 | End: 2023-11-13 | Stop reason: HOSPADM

## 2023-11-08 RX ADMIN — IPRATROPIUM BROMIDE AND ALBUTEROL SULFATE 3 ML: 2.5; .5 SOLUTION RESPIRATORY (INHALATION) at 01:11

## 2023-11-08 RX ADMIN — MUPIROCIN 1 G: 20 OINTMENT TOPICAL at 09:11

## 2023-11-08 RX ADMIN — ALLOPURINOL 50 MG: 300 TABLET ORAL at 09:11

## 2023-11-08 RX ADMIN — FUROSEMIDE 40 MG: 10 INJECTION, SOLUTION INTRAMUSCULAR; INTRAVENOUS at 09:11

## 2023-11-08 RX ADMIN — IPRATROPIUM BROMIDE AND ALBUTEROL SULFATE 3 ML: 2.5; .5 SOLUTION RESPIRATORY (INHALATION) at 07:11

## 2023-11-08 RX ADMIN — METHYLPREDNISOLONE SODIUM SUCCINATE 60 MG: 40 INJECTION, POWDER, FOR SOLUTION INTRAMUSCULAR; INTRAVENOUS at 09:11

## 2023-11-08 RX ADMIN — HYDROCODONE BITARTRATE AND ACETAMINOPHEN 1 TABLET: 5; 325 TABLET ORAL at 09:11

## 2023-11-08 RX ADMIN — DEXTROSE MONOHYDRATE 12.5 G: 25 INJECTION, SOLUTION INTRAVENOUS at 08:11

## 2023-11-08 RX ADMIN — ENOXAPARIN SODIUM 70 MG: 80 INJECTION SUBCUTANEOUS at 04:11

## 2023-11-08 RX ADMIN — AMIODARONE HYDROCHLORIDE 200 MG: 200 TABLET ORAL at 09:11

## 2023-11-08 RX ADMIN — ASPIRIN 81 MG 81 MG: 81 TABLET ORAL at 09:11

## 2023-11-08 RX ADMIN — NITROGLYCERIN 0.4 MG: 0.4 TABLET SUBLINGUAL at 09:11

## 2023-11-08 RX ADMIN — PANTOPRAZOLE SODIUM 40 MG: 40 INJECTION, POWDER, FOR SOLUTION INTRAVENOUS at 09:11

## 2023-11-08 RX ADMIN — ATORVASTATIN CALCIUM 40 MG: 40 TABLET, FILM COATED ORAL at 09:11

## 2023-11-08 RX ADMIN — CEFTRIAXONE SODIUM 1 G: 1 INJECTION, POWDER, FOR SOLUTION INTRAMUSCULAR; INTRAVENOUS at 04:11

## 2023-11-08 RX ADMIN — IPRATROPIUM BROMIDE AND ALBUTEROL SULFATE 3 ML: 2.5; .5 SOLUTION RESPIRATORY (INHALATION) at 08:11

## 2023-11-08 RX ADMIN — CLOPIDOGREL BISULFATE 75 MG: 75 TABLET, FILM COATED ORAL at 09:11

## 2023-11-08 NOTE — PLAN OF CARE
Problem: Infection  Goal: Absence of Infection Signs and Symptoms  Outcome: Ongoing, Progressing     Problem: Adult Inpatient Plan of Care  Goal: Plan of Care Review  Outcome: Ongoing, Progressing  Goal: Patient-Specific Goal (Individualized)  Outcome: Ongoing, Progressing  Goal: Absence of Hospital-Acquired Illness or Injury  Outcome: Ongoing, Progressing  Goal: Optimal Comfort and Wellbeing  Outcome: Ongoing, Progressing  Goal: Readiness for Transition of Care  Outcome: Ongoing, Progressing    Problem: Diabetes Comorbidity  Goal: Blood Glucose Level Within Targeted Range  Outcome: Ongoing, Progressing     Problem: Fluid Imbalance (Pneumonia)  Goal: Fluid Balance  Outcome: Ongoing, Progressing     Problem: Infection (Pneumonia)  Goal: Resolution of Infection Signs and Symptoms  Outcome: Ongoing, Progressing     Problem: Respiratory Compromise (Pneumonia)  Goal: Effective Oxygenation and Ventilation  Outcome: Ongoing, Progressing     Problem: Impaired Wound Healing  Goal: Optimal Wound Healing  Outcome: Ongoing, Progressing        Problem: Skin Injury Risk Increased  Goal: Skin Health and Integrity  Outcome: Ongoing, Progressing     Problem: Fall Injury Risk  Goal: Absence of Fall and Fall-Related Injury  Outcome: Ongoing, Progressing

## 2023-11-08 NOTE — PLAN OF CARE
met with Pt and Pt family at bedside to discuss Pt discharge plan. Pt daughter woulld like for her mother to go SNF to long term. Social explained that PT/OT would need to evaluate to Pt first. Pt daughter understood  provided Pt daughter with a list of facility for SNF and long term placement.       11/08/23 7507   Post-Acute Status   Post-Acute Authorization Placement   Post-Acute Placement Status Patient List Provided

## 2023-11-08 NOTE — CARE UPDATE
11/08/23 0725   Patient Assessment/Suction   Level of Consciousness (AVPU) alert   Respiratory Effort Unlabored   All Lung Fields Breath Sounds diminished;crackles   Skin Integrity   $ Wound Care Tech Time 15 min   Area Observed Bridge of nose   Skin Appearance redness blanchable   Barrier used? Gel Cushion   PRE-TX-O2   Device (Oxygen Therapy) nasal cannula   $ Is the patient on Low Flow Oxygen? Yes   Flow (L/min) 4  (decreased to 3)   SpO2 100 %   Pulse Oximetry Type Continuous   $ Pulse Oximetry - Multiple Charge Pulse Oximetry - Multiple   Pulse 92   Resp 16   Aerosol Therapy   $ Aerosol Therapy Charges Aerosol Treatment   Daily Review of Necessity (SVN) completed   Respiratory Treatment Status (SVN) given   Treatment Route (SVN) mask   Patient Position (SVN) semi-Solorzano's   Post Treatment Assessment (SVN) increased aeration   Signs of Intolerance (SVN) none   Breath Sounds Post-Respiratory Treatment   Throughout All Fields Post-Treatment aeration increased   Post-treatment Heart Rate (beats/min) 90   Post-treatment Resp Rate (breaths/min) 15   Preset CPAP/BiPAP Settings   $ CPAP/BiPAP Daily Charge BiPAP/CPAP Daily   Education   $ Education Bronchodilator;15 min   Respiratory Evaluation   $ Care Plan Tech Time 15 min

## 2023-11-08 NOTE — CARE UPDATE
11/08/23 0030   Patient Assessment/Suction   Level of Consciousness (AVPU) alert   Respiratory Effort Normal;Unlabored   Expansion/Accessory Muscles/Retractions no retractions;no use of accessory muscles   All Lung Fields Breath Sounds diminished;clear   Rhythm/Pattern, Respiratory depth regular;pattern regular   Cough Frequency no cough   PRE-TX-O2   Device (Oxygen Therapy) nasal cannula   $ Is the patient on Low Flow Oxygen? Yes   Flow (L/min) 4   Oxygen Concentration (%) 30   SpO2 99 %   Pulse Oximetry Type Continuous   $ Pulse Oximetry - Single Charge Pulse Oximetry - Single   $ Pulse Oximetry - Multiple Charge Pulse Oximetry - Multiple   Pulse 93   Resp 19   BP (!) 111/53   Positioning Right side;HOB elevated 30 degrees   Positioning   Body Position 30 degrees   Positioning/Transfer Devices pillows;in use   Aerosol Therapy   $ Aerosol Therapy Charges Aerosol Treatment   Daily Review of Necessity (SVN) completed   Respiratory Treatment Status (SVN) given   Treatment Route (SVN) mask;oxygen   Patient Position (SVN) HOB elevated   Post Treatment Assessment (SVN) increased aeration   Signs of Intolerance (SVN) none   Ready to Wean/Extubation Screen   FIO2<=50 (chart decimal) 0.3   Preset CPAP/BiPAP Settings   Mode Of Delivery BiPAP   $ CPAP/BiPAP Daily Charge BiPAP/CPAP Daily   $ Initial CPAP/BiPAP Setup? Yes   $ Is patient using? Yes   Size of Mask Small/Medium   Sized Appropriately? Yes   Equipment Type V60   Airway Device Type medium full face mask   Humidifier not applicable   Ipap 12   EPAP (cm H2O) 6   Pressure Support (cm H2O) 6   Set Rate (Breaths/Min) 12   ITime (sec) 1   Rise Time (sec) 3   Patient CPAP/BiPAP Settings   CPAP/BIPAP ID 11   Timed Inspiration (Sec) 1   IPAP Rise Time (sec) 3   RR Total (Breaths/Min) 30   Tidal Volume (mL) 337   VE Minute Ventilation (L/min) 10.7 L/min   Peak Inspiratory Pressure (cm H2O) 12   TiTOT (%) 36   Total Leak (L/Min) 0   Patient Trigger - ST Mode Only (%) 99    CPAP/BiPAP Alarms   High Pressure (cm H2O) 30   Low Pressure (cm H2O) 10   Minute Ventilation (L/Min) 3   High RR (breaths/min) 45   Low RR (breaths/min) 5   Apnea (Sec) 20   Education   $ Education Bronchodilator;BiPAP;30 min

## 2023-11-08 NOTE — SUBJECTIVE & OBJECTIVE
Interval History:  Patient seen and examined this morning, reports feeling well.  On 4 L of oxygen with great saturations.  Unaware of why she was in the hospital.  Alert and oriented to self.    Review of Systems   Constitutional:  Negative for chills and diaphoresis.   Respiratory:  Negative for choking and shortness of breath.    Cardiovascular:  Negative for chest pain and palpitations.     Objective:     Vital Signs (Most Recent):  Temp: 98.6 °F (37 °C) (11/08/23 0301)  Pulse: 92 (11/08/23 0725)  Resp: 16 (11/08/23 0725)  BP: (!) 160/126 (11/08/23 0605)  SpO2: 100 % (11/08/23 0725) Vital Signs (24h Range):  Temp:  [98 °F (36.7 °C)-98.9 °F (37.2 °C)] 98.6 °F (37 °C)  Pulse:  [] 92  Resp:  [15-36] 16  SpO2:  [94 %-100 %] 100 %  BP: ()/() 160/126  Arterial Line BP: ()/(38-80) 108/38     Weight: 77.7 kg (171 lb 4.8 oz)  Body mass index is 30.34 kg/m².    Intake/Output Summary (Last 24 hours) at 11/8/2023 1237  Last data filed at 11/8/2023 0631  Gross per 24 hour   Intake --   Output 3950 ml   Net -3950 ml         Physical Exam  Cardiovascular:      Rate and Rhythm: Normal rate and regular rhythm.   Pulmonary:      Effort: No respiratory distress.      Breath sounds: No wheezing.   Abdominal:      General: There is no distension.      Tenderness: There is no abdominal tenderness.   Neurological:      Mental Status: She is alert.             Significant Labs: All pertinent labs within the past 24 hours have been reviewed.  CBC:   Recent Labs   Lab 11/07/23 0406 11/07/23 0443 11/08/23 0344 11/08/23 0609   WBC 15.50*  --  11.26  --    HGB 9.0*  --  8.4*  --    HCT 29.8* 25* 28.5* 25*     --  251  --      CMP:   Recent Labs   Lab 11/07/23  0406 11/08/23  0344    146*   K 4.2 3.8   CL 97 100   CO2 37* 37*   GLU 82 65*   BUN 45* 41*   CREATININE 1.6* 1.6*   CALCIUM 8.3* 8.1*   PROT 5.7* 5.4*   ALBUMIN 3.0* 2.9*   BILITOT 0.3 0.4   ALKPHOS 67 61   AST 14 17   ALT 8* 9*   ANIONGAP 8  9       Significant Imaging: I have reviewed all pertinent imaging results/findings within the past 24 hours.

## 2023-11-08 NOTE — ASSESSMENT & PLAN NOTE
Creatine stable for now. BMP reviewed- noted Estimated Creatinine Clearance: 29.5 mL/min (A) (based on SCr of 1.6 mg/dL (H)). according to latest data. Based on current GFR, CKD stage is stage 3 - GFR 30-59.  Monitor UOP and serial BMP and adjust therapy as needed. Renally dose meds. Avoid nephrotoxic medications and procedures.

## 2023-11-08 NOTE — PLAN OF CARE
Problem: SLP  Goal: SLP Goal  Description: 1. Pt will tolerate IDDSI 5, 0 diet w/o overt s/s aspiration and w/ timely pharyngeal swallow initiation noted via palpation during >95% of PO intake  2. Pt and family will participate in dysphagia education  3. Pt will recall and demonstrate use of swallowing precautions during >95% of  PO intake  Outcome: Ongoing, Progressing     CSE completed. Rec IDDSI 5- minced and moist diet w/ thin liquids. Strict aspiration precautions to be implemented (eliminate distractions, small bites/sips, 1 bite at a time, HOB upright 90 degrees bent at hips). ST to follow.

## 2023-11-08 NOTE — PLAN OF CARE
Pt remains in ICU. Extubated now. CM following for dc plans         11/08/23 1431   Discharge Reassessment   Assessment Type Discharge Planning Reassessment   Discharge Plan A [facility]   Discharge Plan B Hospice/home

## 2023-11-08 NOTE — CARE UPDATE
11/08/23 0605   PRE-TX-O2   SpO2 99 %   Pulse 87   Resp (!) 28   BP (!) 160/126   Labs   $ Was an ABG obtained? Arterial Blood Draw from Existing Line   $ Labs Tech Time 15 min

## 2023-11-08 NOTE — ASSESSMENT & PLAN NOTE
Likely secondary to aspiration pneumonia, patient also on steroids which can elevate white blood cells.  , reviewed CBC is white blood cells improving     Medical Assistant's notes reviewed and appreciated.    Mr. Cleary comes in today for multiple problems:    1. Recheck Type 2 DM -- checks blood sugars 3-4x/week and says average is around 160  2. Recheck HTN -- denies chest pain  3. Recheck Hyperlipidemia -- pt states he did have some diffuse muscle aches shortly after starting on Crestor. States that it felt like he was always working out. Most of those body aches are better.    Problem list, PMH, med list and allergies reviewed.      SH:   Social History   Substance Use Topics   • Smoking status: Current Some Day Smoker     Types: Cigars   • Smokeless tobacco: Never Used      Comment: occasional   • Alcohol use 0.0 oz/week     0 Standard drinks or equivalent per week      Comment: 1-2x/week         PHYSICAL EXAMINATION:  CONSTITUTIONAL: The patient appears comfortable, nontoxic, and in no apparent distress.  Visit Vitals   • /70   • Pulse 52   • Resp 12   • Wt 98 kg   • BMI 30.99 kg/m2     RESPIRATORY: Respiratory effort was unlabored.  Lungs are clear to auscultation without rales, wheezes, or rhonchi.   CARDIAC: Regular rate and rhythm without S3, S4.    Abdomen: soft, nontender, nondistended, no hepatomegaly   MUSCULOSKELETAL: Extremities are without edema.    ASSESSMENT/PLAN:  1. Type 2 DM -- due for eye exam. Check A1c. Continue on present therapy  2. Hyperlipidemia- -recheck lipids. Continue on Crestor and fenofibrate. Check CPK since he did have some diffuse muscle aches that have improved.   3. HTN -- BP is good on current meds.     He will call immediately in the meantime with any questions, concerns, or problems.    Disposition/follow-up as below

## 2023-11-08 NOTE — ASSESSMENT & PLAN NOTE
Patient's COPD is with exacerbation noted by continued dyspnea, use of accessory muscles for breathing and worsening of baseline hypoxia currently.  Patient is currently on COPD Pathway. Continue scheduled inhalers Steroids, Antibiotics and Supplemental oxygen and monitor respiratory status closely.     Patient with severe end-stage COPD, currently on home hospice.  On 8 L oxygen at home.  Discussions held with daughter about overall prognosis and difficulty weaning ventilation, daughter verbalized understanding.  Patient was successfully extubated on 11/07/2023.  Steroids wean down to prednisone daily.  Continue inhalers.  Need to discuss with daughter about without resuscitation status.

## 2023-11-08 NOTE — ASSESSMENT & PLAN NOTE
Patient is identified as having Diastolic (HFpEF) heart failure that is Acute on chronic. CHF is currently controlled. Latest ECHO performed and demonstrates- Results for orders placed during the hospital encounter of 08/25/23    Echo Saline Bubble? No    Interpretation Summary    Left Ventricle: The left ventricle is normal in size. Moderately increased ventricular mass. Moderately increased wall thickness. There is moderate concentrict hypertrophy. Normal wall motion. There is normal systolic function.  EF 61% There is normal diastolic function.    Left Atrium: Left atrium is moderately dilated.    Right Ventricle: Normal right ventricular cavity size. Wall thickness is normal. Right ventricle wall motion  is normal. Systolic function is normal.    Mitral Valve: Mildly thickened anterior leaflet. Mild mitral annular calcification.    IVC/SVC: Normal venous pressure at 3 mmHg.    Pericardium: There is an effusion.    Limited echo; no gross pulmonary hypertension but poor visualization of tricuspid signal    No tissue Doppler performed to assess mean left atrial pressure    Lasix decreased to  20 mg p.o. daily     Recent Labs   Lab 11/04/23  1057   BNP 1,995*   .

## 2023-11-08 NOTE — PROGRESS NOTES
Progress Note  PULMONARY    Admit Date: 11/4/2023 11/08/2023  History of Present Illness:  Pt is a 77 yo female with end stage COPD, chronic resp failure on 8L home O2 who is on hospice. She called EMS and was in distress then became unresponsive. She was found to be hypercapneic intubated in ED before her hospice status was known. Her daughter is at bedside and wishes to continue vent support for now hoping she may improve.  Pt has been at home on hospice but daughter states she wants her to go to NH as she hasn't been doing well at home.    SUBJECTIVE:     11/5- continues on vent, with SAT/SBT pt wakes and follows commands but tachypneic, tachycardic and hypertensive so failed SBT. Daughter at bedside actively participating. Pt hard of hearing and has her hearing aid R ear    11/6 patient lasted 15 minutes on her sedation vacation and spontaneous breathing trial this morning.  I do not have an ABG.  That is being corrected.  The patient was on hospice.  I have spoken with her daughter who states EMS was called 1st, not hospice.  She was intubated before hospice was ever notified.  The daughter would like to give her 4-7 days on the ventilator and see if she can improve.  I told her that I am not hopeful of this.  The patient was on 8 L of oxygen at home.    11/7 the patient failed her sedation vacation.  The nurse reports 5 minutes, the Respiratory therapist reports 20.  We will repeat it.      11/8 the patient was successfully extubated yesterday.  She wore BiPAP overnight at 12/6.  Her ABG this morning was good.  Tried to talk to her about her code/intubation situation but could not get understanding from the patient.  Will try to revisit this when her daughter gets here.      OBJECTIVE:     Vitals (Most recent):  Vitals:    11/08/23 0725   BP:    Pulse: 92   Resp: 16   Temp:        Vitals (24 hour range):  Temp:  [98 °F (36.7 °C)-98.9 °F (37.2 °C)]   Pulse:  []   Resp:  [12-36]   BP: ()/()    SpO2:  [92 %-100 %]   Arterial Line BP: (-)/(-)       Intake/Output Summary (Last 24 hours) at 11/8/2023 0829  Last data filed at 11/8/2023 0631  Gross per 24 hour   Intake --   Output 3950 ml   Net -3950 ml          PHYSICAL EXAM  GENERAL: Older patient in no distress, sedated on the ventilator.  HEENT: Pupils equal and reactive. Extraocular movements intact. Nose   Pharynx moist  NECK: Supple.   HEART: Regular rate and rhythm. No murmur or gallop auscultated.  LUNGS:  There are clear anteriorly and laterally.  Lung excursion symmetrical. No change in fremitus.   ABDOMEN: Bowel sounds present. Non-tender, no masses palpated.  : Normal anatomy.  Montes with yellow urine  EXTREMITIES: Normal muscle tone and joint movement, no cyanosis or clubbing.  The heels are looking better.  There is an eschar over the 3rd toe on the right foot.  Right midline, left arterial line  LYMPHATICS: No adenopathy palpated, tr edema.  SKIN: Dry multiple abrasions and eschars, decubitus over her coccyx has progressed terribly to a big thick eschar the appearance of a Stephane lesion.  NEURO:  Awake and interactive but confused  PSYCH:  Quiet      Radiographs reviewed: view by direct vision   Chest x-ray 11/08/2023  There are faint linear opacities at the lung bases suggestive of atelectasis/scarring, aspiration/pneumonia or trace edema in the appropriate clinical setting. There is blunting of both costophrenic angles consistent with trace pleural effusions and adjacent atelectasis. No pneumothorax is identified. The heart is top normal in size. Osseous structures appear unchanged. The visualized upper abdomen is unremarkable       TTE 8/26/23-     Left Ventricle: The left ventricle is normal in size. Moderately increased ventricular mass. Moderately increased wall thickness. There is moderate concentrict hypertrophy. Normal wall motion. There is normal systolic function.  EF 61% There is normal diastolic function.    Left  Atrium: Left atrium is moderately dilated.    Right Ventricle: Normal right ventricular cavity size. Wall thickness is normal. Right ventricle wall motion  is normal. Systolic function is normal.    Mitral Valve: Mildly thickened anterior leaflet. Mild mitral annular calcification.    IVC/SVC: Normal venous pressure at 3 mmHg.    Pericardium: There is an effusion.    Limited echo; no gross pulmonary hypertension but poor visualization of tricuspid signal    No tissue Doppler performed to assess mean left atrial pressure    Labs     Recent Labs   Lab 11/08/23 0344 11/08/23  0609   WBC 11.26  --    HGB 8.4*  --    HCT 28.5* 25*     --      Recent Labs   Lab 11/08/23  0344   *   K 3.8      CO2 37*   BUN 41*   CREATININE 1.6*   GLU 65*   CALCIUM 8.1*   MG 2.3   PHOS 4.2   AST 17   ALT 9*   ALKPHOS 61   BILITOT 0.4   PROT 5.4*   ALBUMIN 2.9*     Recent Labs   Lab 11/08/23  0609   PH 7.475*   PCO2 52.4*   PO2 75*   HCO3 38.6*   ABG on assist control rate of 18, tidal volume 350, FiO2 35%, peep of 5  Microbiology Results (last 7 days)       Procedure Component Value Units Date/Time    Blood Culture #2 **CANNOT BE ORDERED STAT** [[id number]] Collected: 11/04/23 1552    Order Status: Completed Specimen: Blood Updated: 11/07/23 1632     Blood Culture, Routine No Growth to date      No Growth to date      No Growth to date      No Growth to date    Narrative:      Collection has been rescheduled by [facility] at 11/04/2023 12:28 Reason:   Unable to collect 2nd set  Collection has been rescheduled by [facility] at 11/04/2023 12:28 Reason:   Unable to collect 2nd set    Blood Culture #1 **CANNOT BE ORDERED STAT** [[id number]] Collected: 11/04/23 1204    Order Status: Completed Specimen: Blood Updated: 11/07/23 1432     Blood Culture, Routine No Growth to date      No Growth to date      No Growth to date      No Growth to date    Urine culture [[id number]] Collected: 11/04/23 1134    Order Status: Completed Specimen:  Urine from Clean Catch Updated: 11/06/23 1646     Urine Culture, Routine No growth    Culture, Respiratory with Gram Stain [[id number]]  (Abnormal)  (Susceptibility) Collected: 11/04/23 1122    Order Status: Completed Specimen: Respiratory from Sputum Updated: 11/06/23 0706     Respiratory Culture KLEBSIELLA PNEUMONIAE  Few       Gram Stain (Respiratory) <10 epithelial cells per low power field.     Gram Stain (Respiratory) Few WBC's     Gram Stain (Respiratory) Few Gram negative rods    MRSA Screen by PCR [[id number]]  (Abnormal) Collected: 11/04/23 1609    Order Status: Completed Specimen: Nasopharyngeal Swab from Nasal Updated: 11/04/23 1736     MRSA SCREEN BY PCR Positive     Comment: Positive MRSA by PCR called and verbal readback obtained from Ines Alonzo, ICU, RN. by [facility] [place] 11/04/2023 17:36         Narrative:      Positive MRSA by PCR called and verbal readback obtained from Ines Alonzo ICU, RN. by [facility] [place] 11/04/2023 17:36    Urine Culture High Risk [[id number]]     Order Status: Completed Specimen: Urine             Impression:  Active Hospital Problems    Diagnosis  POA    *Acute respiratory failure with hypoxia and hypercarbia [J96.01, J96.02]  Yes    Atrial fibrillation [I48.91]  No    Pneumonia  [J18.9]  Yes    COPD exacerbation [J44.1]  Yes    Acute on chronic heart failure with preserved ejection fraction (HFpEF) [I50.33]  Yes    Hyperkalemia [E87.5]  Yes    Leukocytosis [D72.829]  Yes    Chronic kidney disease, stage 4 (severe) [N18.4]  Yes    DM type 2, controlled, with complication [E11.8]  Yes    Essential hypertension, benign [I10]  Yes      Resolved Hospital Problems   No resolved problems to display.           Assessment/Plan:     Acute on chronic hypercapneic/hypoxemic respiratory failure with mechanical ventilation  - on 8 L of oxygen at home  - extubated yesterday  - adequate oxygenation on nasal cannula  - slept on BiPAP, will continue this  Acute on chronic diastolic CHF  Bilateral pleural  effusions  - on Lasix IV, creatinine stable  - continuing to diurese  COPD with acute exacerbation  - no wheezing auscultated  - move DuoNebs to q.6  - discontinue Solu-Medrol  - start prednisone  Right lower lobe pneumonia  - Klebsiella pneumoniae  - continue Rocephin  Chest pain this morning  - no acute changes on EKG  - known coronary artery disease and recent stents  - she is anticoagulated and receiving Plavix and aspirin  Acute on chronic renal failure  - creatinine stable  Atrial fibrillation  - on amiodarone  Coronary artery disease  - recent stents  Anemia  - chronic disease  - hemoglobin down  Hyperglycemia and diabetes mellitus  Hypernatremia  - change Lasix from 40 IV to 20 p.o.  - begin oral diet  Troponin leak  Mild hypoalbuminemia  Decubiti to heels and sacrum  - offload pressure to heels  - right heel looks normal again, left heel is still a little red  - sacral decubitus has progressed dramatically to an eschar and Stephane lesion appearance  Leukocytosis  - better today    Patient was a hospice patient on admission.  Will need to talk with her daughter we have left her a DNR.        Will continue current dose of enoxaparin with the AFib on this admission  Protonix for GI prophylaxis      Critical care time spent reviewing the chart, examining the patient, reviewing the labs, reviewing the radiological findings, discussing care with nursing, physicians, and respiratory and creating the note and  has been greater than 35 minutes.

## 2023-11-08 NOTE — PROGRESS NOTES
"[facility]  Department of Cardiology  Progress Note      PATIENT NAME: Marisol Kerr  MRN: [id number]  TODAY'S DATE: 11/08/2023  ADMIT DATE: 11/4/2023                          CONSULT REQUESTED BY: Radha Maravilla MD    SUBJECTIVE     PRINCIPAL PROBLEM: Acute respiratory failure with hypoxia and hypercarbia      REASON FOR CONSULT:  Elevated troponin, ADHF    INTERVAL HISTORY:  11/8/23  Pt resting comfortably. Nurse reported episode of chest pain earlier today alleviated by one ntg tab. VSS.       11/7/23  Cr 1.6, VSS;  Recently extubated and on NC  Denies chest pain  +coughing  -2200 net balance    HPI:  Pt intubated/sedated. No family present.  HPI taken from medical record:    "Marisol Kerr is a 78 y.o. White female   With PMH of HFpEF, COPD,   CAD, DM2, HTN,   who presents with SOB.     Pt currently intubated  Per EMS report, pt was found gasping for air  Said "help me, help me"  then went unresponsive  She was seated, no head trauma  Pt intubated before arrival to ER  No cardiac arrest occured     Pt was DNR, DNI + on hospice  but hospice + DNR/DNI has been revoked by her daughter"         Review of patient's allergies indicates:   Allergen Reactions    Iodinated contrast media     Strawberries [strawberry] Hives and Itching       Past Medical History:   Diagnosis Date    CAD (coronary artery disease)     Cancer     colon    CHF (congestive heart failure)     Colitis     Colon cancer     COPD (chronic obstructive pulmonary disease)     Decreased hearing     Diabetes mellitus     Diabetes mellitus, type 2     Hypercholesterolemia     Hypertension     Hypoxemia     Insomnia     Obesity     Osteoarthritis      Past Surgical History:   Procedure Laterality Date    ANGIOGRAM, CORONARY, WITH LEFT HEART CATHETERIZATION N/A 2/10/2022    Procedure: Angiogram, Coronary, with Left Heart Cath;  Surgeon: Cristian Benjamin MD;  Location: [facility] CATH/EP LAB;  Service: Cardiology;  Laterality: N/A;    CARDIAC " CATHETERIZATION      CHOLECYSTECTOMY      COLECTOMY      CORONARY ANGIOGRAPHY N/A 2023    Procedure: ANGIOGRAM, CORONARY ARTERY;  Surgeon: Nba Riggs MD;  Location: [facility] CATH LAB;  Service: Cardiology;  Laterality: N/A;    CORONARY ANGIOPLASTY      CORONARY STENT PLACEMENT      ERCP N/A 2023    Procedure: ERCP (ENDOSCOPIC RETROGRADE CHOLANGIOPANCREATOGRAPHY);  Surgeon: Biju Kramer III, MD;  Location: [facility] ENDO;  Service: Endoscopy;  Laterality: N/A;    ESOPHAGOGASTRODUODENOSCOPY N/A 2023    Procedure: EGD (ESOPHAGOGASTRODUODENOSCOPY);  Surgeon: Aarti Alvarez MD;  Location: [facility] ENDO;  Service: Endoscopy;  Laterality: N/A;    EXTERNAL EAR SURGERY      EYE SURGERY      FLEXIBLE SIGMOIDOSCOPY N/A 2019    Procedure: SIGMOIDOSCOPY, FLEXIBLE;  Surgeon: Biju Kramer III, MD;  Location: [facility] ENDO;  Service: Endoscopy;  Laterality: N/A;    HYSTERECTOMY      partial    INSTANTANEOUS WAVE-FREE RATIO (IFR) N/A 2023    Procedure: Instantaneous Wave-Free Ratio (IFR);  Surgeon: Nba Riggs MD;  Location: [facility] CATH LAB;  Service: Cardiology;  Laterality: N/A;    STENT, DRUG ELUTING, SINGLE VESSEL, CORONARY N/A 2023    Procedure: Stent, Drug Eluting, Single Vessel, Coronary;  Surgeon: Nba Riggs MD;  Location: [facility] CATH LAB;  Service: Cardiology;  Laterality: N/A;     Social History     Tobacco Use    Smoking status: Former     Current packs/day: 0.00     Average packs/day: 3.0 packs/day for 50.0 years (150.1 ttl pk-yrs)     Types: Cigarettes     Start date: 3/30/1964     Quit date: 2006     Years since quittin.7    Smokeless tobacco: Never    Tobacco comments:     ex smoker 15 years   Substance Use Topics    Alcohol use: Yes    Drug use: No        REVIEW OF SYSTEMS  See interval history    OBJECTIVE     VITAL SIGNS (Most Recent)  Temp: 98.1 °F (36.7 °C) (23 1301)  Pulse: 107 (23 1430)  Resp: (!) 32 (23 1430)  BP: (!) 148/70 (23  1430)  SpO2: 96 % (11/08/23 1430)    VENTILATION STATUS  Resp: (!) 32 (11/08/23 1430)  SpO2: 96 % (11/08/23 1430)  Oxygen Concentration (%):  [30] 30        I & O (Last 24H):  Intake/Output Summary (Last 24 hours) at 11/8/2023 1600  Last data filed at 11/8/2023 0631  Gross per 24 hour   Intake --   Output 3950 ml   Net -3950 ml         WEIGHTS  Wt Readings from Last 3 Encounters:   11/08/23 0400 77.7 kg (171 lb 4.8 oz)   11/07/23 0400 74.6 kg (164 lb 7.4 oz)   11/06/23 0400 68.9 kg (151 lb 14.4 oz)   11/05/23 0400 80 kg (176 lb 5.9 oz)   11/04/23 1520 74.9 kg (165 lb 2 oz)   11/04/23 1048 76.9 kg (169 lb 8.5 oz)   09/05/23 0415 74.6 kg (164 lb 7.4 oz)   09/04/23 0522 73.7 kg (162 lb 7.7 oz)   09/01/23 2241 69 kg (152 lb 1.9 oz)   09/01/23 0603 81.2 kg (179 lb 0.2 oz)   08/31/23 0442 80.2 kg (176 lb 12.9 oz)   08/30/23 0357 81 kg (178 lb 9.2 oz)   08/29/23 1146 77.2 kg (170 lb 3.1 oz)   08/28/23 0400 77.2 kg (170 lb 3.1 oz)   08/27/23 0714 75 kg (165 lb 5.5 oz)   08/27/23 0400 75 kg (165 lb 5.5 oz)   08/26/23 0230 73.4 kg (161 lb 13.1 oz)   08/25/23 2211 68 kg (150 lb)       PHYSICAL EXAM    GENERAL: chronically ill appearing elderly female resting comfortably in bed  HEENT: Normocephalic.    NECK: No JVD.   CARDIAC: Irregular rate and rhythm.   CHEST ANATOMY: normal.   LUNGS: breathing comfortably on low flow O2;  diminished bases  ABDOMEN: Soft obese,  + bowel sounds.    EXTREMITIES: No edema  CENTRAL NERVOUS SYSTEM: alert, responsive, moves all extremities  SKIN: No rash     HOME MEDICATIONS:  No current facility-administered medications on file prior to encounter.     Current Outpatient Medications on File Prior to Encounter   Medication Sig Dispense Refill    albuterol (VENTOLIN HFA) 90 mcg/actuation inhaler Inhale 2 puffs into the lungs every 6 (six) hours as needed for Wheezing or Shortness of Breath. Rescue 36 g 3    albuterol-ipratropium (DUO-NEB) 2.5 mg-0.5 mg/3 mL nebulizer solution Take 3 mLs by  nebulization every 4 (four) hours as needed for Wheezing or Shortness of Breath. Rescue 120 each 6    allopurinoL (ZYLOPRIM) 100 MG tablet Take 0.5 tablets (50 mg total) by mouth once daily. 45 tablet 1    ALPRAZolam (XANAX) 0.25 MG tablet Take 1 tablet (0.25 mg total) by mouth every evening. 90 tablet 1    amLODIPine (NORVASC) 10 MG tablet Take 1 tablet (10 mg total) by mouth once daily. 30 tablet 11    aspirin 81 MG Chew Chew and swallow 1 tablet (81 mg total) by mouth once daily. 30 tablet 11    atorvastatin (LIPITOR) 40 MG tablet Take 1 tablet (40 mg total) by mouth once daily. 90 tablet 3    cholestyramine-aspartame (QUESTRAN/PREVALITE LIGHT) 4 gram Powd Take 4 g by mouth once daily.      clopidogreL (PLAVIX) 75 mg tablet Take 1 tablet (75 mg total) by mouth once daily. 30 tablet 11    coenzyme Q10 100 mg capsule Take 100 mg by mouth every morning.      dexAMETHasone (DECADRON) 4 MG Tab Take 4 mg by mouth Daily.      ergocalciferol (VITAMIN D2) 50,000 unit Cap Take 1 capsule (50,000 Units total) by mouth every 7 days. 12 capsule 1    ferrous sulfate 325 (65 FE) MG EC tablet Take 325 mg by mouth once daily.      fish oil-omega-3 fatty acids 300-1,000 mg capsule Take 2 capsules by mouth every morning.      furosemide (LASIX) 20 MG tablet Take 1 tablet (20 mg total) by mouth every other day. TAKE WITH POTASSIUM 30 tablet 4    gabapentin (NEURONTIN) 300 MG capsule Take 300 mg by mouth Daily.      ipratropium-albuteroL (COMBIVENT RESPIMAT)  mcg/actuation inhaler Inhale 2 puffs into the lungs every 4 (four) hours as needed for Shortness of Breath. Rescue 12 g 3    ketoconazole (NIZORAL) 2 % cream Apply 1 application  topically 2 (two) times daily.      magnesium oxide (MAG-OX) 400 mg (241.3 mg magnesium) tablet Take 1 tablet by mouth 2 (two) times daily.      metoprolol succinate (TOPROL-XL) 200 MG 24 hr tablet Take 1 tablet (200 mg total) by mouth once daily. 30 tablet 5    mupirocin (BACTROBAN) 2 % ointment  Apply topically 2 (two) times daily. (Patient taking differently: Apply 1 g topically 2 (two) times daily.) 30 g 3    nitroGLYCERIN (NITROSTAT) 0.4 MG SL tablet Place 1 tablet (0.4 mg total) under the tongue every 5 (five) minutes as needed for Chest pain. Up to 3 doses. If chest pain is not relieved or worsens 3 to 5 minutes after 1 dose, call 911 and seek immediate emergency medical attention. 25 tablet 2    pantoprazole (PROTONIX) 40 MG tablet Take 1 tablet (40 mg total) by mouth once daily. 90 tablet 3    potassium chloride (MICRO-K) 10 MEQ CpSR Take 1 capsule (10 mEq total) by mouth every other day. (TAKE WITH LASIX/FUROSEMIDE) 15 capsule 0    triamcinolone acetonide 0.1% (KENALOG) 0.1 % cream Apply 1 g topically 2 (two) times daily.      umeclidinium (INCRUSE ELLIPTA) 62.5 mcg/actuation inhalation capsule Inhale 62.5 mcg into the lungs every morning. Controller 90 each 3    vit C/E/Zn/coppr/lutein/zeaxan (PRESERVISION AREDS-2 ORAL) Take 1 tablet by mouth once daily.      VITAMIN C 500 MG tablet Take 500 mg by mouth 2 (two) times daily.      [DISCONTINUED] benazepriL (LOTENSIN) 40 MG tablet Take 1 tablet (40 mg total) by mouth once daily. 90 tablet 1    [DISCONTINUED] cimetidine (TAGAMET) 300 MG tablet Take 1 tablet (300 mg total) by mouth every 6 (six) hours. Take 1 tablet (300 mg total) by mouth starting at 6 PM on Sunday April 17, 2022 (the evening before your angiogram procedure). Then take 1 tablet at 12 AM, then take 1 tablet at 6 AM on Monday April 18th. 3 tablet 0    [DISCONTINUED] lisinopriL 10 MG tablet Take 1 tablet (10 mg total) by mouth once daily. HOLD UNTIL OTHERWISE DIRECTED BY PRIMARY CARE PROVIDER 90 tablet 3    [DISCONTINUED] lovastatin (MEVACOR) 40 MG tablet Take 1 tablet (40 mg total) by mouth every other day. 45 tablet 3       SCHEDULED MEDS:   albuterol-ipratropium  3 mL Nebulization Q6H    allopurinoL  50 mg Oral Daily    amiodarone  200 mg Oral BID    aspirin  81 mg Oral Daily     atorvastatin  40 mg Oral QHS    cefTRIAXone (ROCEPHIN) IVPB  1 g Intravenous Q24H    clopidogreL  75 mg Oral Daily    enoxparin  1 mg/kg Subcutaneous Q24H (treatment, non-standard time)    [START ON 11/9/2023] furosemide  20 mg Oral Daily    mupirocin   Nasal BID    pantoprazole  40 mg Intravenous Daily    potassium, sodium phosphates  1 packet Oral Once    [START ON 11/9/2023] predniSONE  40 mg Oral Daily       CONTINUOUS INFUSIONS:        PRN MEDS:acetaminophen, albuterol-ipratropium, calcium gluconate IVPB, calcium gluconate IVPB, calcium gluconate IVPB, dextrose 50%, dextrose 50%, glucagon (human recombinant), glucose, glucose, HYDROcodone-acetaminophen, insulin aspart U-100, magnesium sulfate IVPB, magnesium sulfate IVPB, melatonin, metoprolol, morphine, naloxone, nitroGLYCERIN, ondansetron, polyethylene glycol, potassium chloride **AND** potassium chloride **AND** potassium chloride, senna-docusate 8.6-50 mg, simethicone, sodium chloride 0.9%, sodium phosphate 15 mmol in dextrose 5 % (D5W) 250 mL IVPB, sodium phosphate 20.01 mmol in dextrose 5 % (D5W) 250 mL IVPB, sodium phosphate 30 mmol in dextrose 5 % (D5W) 250 mL IVPB    LABS AND DIAGNOSTICS     CBC LAST 3 DAYS  Recent Labs   Lab 11/06/23  0401 11/06/23  0526 11/07/23  0406 11/07/23  0443 11/08/23  0344 11/08/23  0609   WBC 11.56  --  15.50*  --  11.26  --    RBC 3.30*  --  3.31*  --  3.15*  --    HGB 8.8*  --  9.0*  --  8.4*  --    HCT 29.8*   < > 29.8* 25* 28.5* 25*   MCV 90  --  90  --  91  --    MCH 26.7*  --  27.2  --  26.7*  --    MCHC 29.5*  --  30.2*  --  29.5*  --    RDW 15.2*  --  15.4*  --  15.8*  --      --  238  --  251  --    MPV 11.9  --  12.1  --  12.1  --    GRAN 83.8*  9.7*  --  83.1*  12.9*  --  81.9*  9.2*  --    LYMPH 9.6*  1.1  --  9.2*  1.4  --  10.6*  1.2  --    MONO 5.1  0.6  --  5.2  0.8  --  6.3  0.7  --    BASO 0.03  --  0.03  --  0.03  --    NRBC 0  --  0  --  0  --     < > = values in this interval not  "displayed.         COAGULATION LAST 3 DAYS  No results for input(s): "LABPT", "INR", "APTT" in the last 168 hours.    CHEMISTRY LAST 3 DAYS  Recent Labs   Lab 11/06/23  0401 11/06/23  0526 11/07/23  0406 11/07/23  0443 11/07/23  0930 11/07/23  1047 11/08/23  0344 11/08/23  0609     142  --  142  --   --   --  146*  --    K 4.7  4.7  --  4.2  --   --   --  3.8  --    CL 97  97  --  97  --   --   --  100  --    CO2 38*  38*  --  37*  --   --   --  37*  --    ANIONGAP 7*  7*  --  8  --   --   --  9  --    BUN 45*  45*  --  45*  --   --   --  41*  --    CREATININE 1.9*  1.9*  --  1.6*  --   --   --  1.6*  --    *  155*  --  82  --   --   --  65*  --    CALCIUM 8.4*  8.4*  --  8.3*  --   --   --  8.1*  --    PH  --    < >  --    < > 7.501* 7.429  --  7.475*   MG 2.2  --  2.2  --   --   --  2.3  --    ALBUMIN 3.0*  --  3.0*  --   --   --  2.9*  --    PROT 5.7*  --  5.7*  --   --   --  5.4*  --    ALKPHOS 59  --  67  --   --   --  61  --    ALT 8*  --  8*  --   --   --  9*  --    AST 15  --  14  --   --   --  17  --    BILITOT 0.3  --  0.3  --   --   --  0.4  --     < > = values in this interval not displayed.         CARDIAC PROFILE LAST 3 DAYS  Recent Labs   Lab 11/04/23  1057   BNP 1,995*         ENDOCRINE LAST 3 DAYS  Recent Labs   Lab 11/04/23  1057   PROCAL 0.149         LAST ARTERIAL BLOOD GAS  ABG  Recent Labs   Lab 11/08/23  0609   PH 7.475*   PO2 75*   PCO2 52.4*   HCO3 38.6*   BE 15*         LAST 7 DAYS MICROBIOLOGY   Microbiology Results (last 7 days)       Procedure Component Value Units Date/Time    Blood Culture #1 **CANNOT BE ORDERED STAT** [[id number]] Collected: 11/04/23 1204    Order Status: Completed Specimen: Blood Updated: 11/08/23 1432     Blood Culture, Routine No Growth to date      No Growth to date      No Growth to date      No Growth to date      No Growth to date    Blood Culture #2 **CANNOT BE ORDERED STAT** [[id number]] Collected: 11/04/23 1552    Order Status: " Completed Specimen: Blood Updated: 11/07/23 1632     Blood Culture, Routine No Growth to date      No Growth to date      No Growth to date      No Growth to date    Narrative:      Collection has been rescheduled by [facility] at 11/04/2023 12:28 Reason:   Unable to collect 2nd set  Collection has been rescheduled by [facility] at 11/04/2023 12:28 Reason:   Unable to collect 2nd set    Urine culture [[id number]] Collected: 11/04/23 1134    Order Status: Completed Specimen: Urine from Clean Catch Updated: 11/06/23 1646     Urine Culture, Routine No growth    Culture, Respiratory with Gram Stain [[id number]]  (Abnormal)  (Susceptibility) Collected: 11/04/23 1122    Order Status: Completed Specimen: Respiratory from Sputum Updated: 11/06/23 0706     Respiratory Culture KLEBSIELLA PNEUMONIAE  Few       Gram Stain (Respiratory) <10 epithelial cells per low power field.     Gram Stain (Respiratory) Few WBC's     Gram Stain (Respiratory) Few Gram negative rods    MRSA Screen by PCR [[id number]]  (Abnormal) Collected: 11/04/23 1609    Order Status: Completed Specimen: Nasopharyngeal Swab from Nasal Updated: 11/04/23 1736     MRSA SCREEN BY PCR Positive     Comment: Positive MRSA by PCR called and verbal readback obtained from [facility], RN. by [facility] 11/04/2023 17:36         Narrative:      Positive MRSA by PCR called and verbal readback obtained from [facility], RN. by [facility] 11/04/2023 17:36    Urine Culture High Risk [[id number]]     Order Status: Completed Specimen: Urine             MOST RECENT IMAGING  X-Ray Chest AP Portable  CLINICAL HISTORY:  78 years (1945) Female resp failure Respiratory failure    TECHNIQUE:  Portable AP radiograph the chest. One view.    COMPARISON:  Radiograph November 17, 2023.    FINDINGS:  There are faint linear opacities at the lung bases suggestive of atelectasis/scarring, aspiration/pneumonia or trace edema in the appropriate clinical setting. There is blunting of both costophrenic  angles consistent with trace pleural effusions and adjacent atelectasis. No pneumothorax is identified. The heart is top normal in size. Osseous structures appear unchanged. The visualized upper abdomen is unremarkable.    Lines and tubes: Interval extubation and removal of the enteric tube. Right-sided subclavian medical infusion port with tip at the level of the SVC.    IMPRESSION:  Interval extubation and removal of the enteric tube, otherwise unchanged radiograph of the chest.    .    Electronically signed by:  García Russell MD  11/08/2023 08:43 AM [facility] Workstation: [id number]      ECHOCARDIOGRAM RESULTS (last 5)  Results for orders placed during the hospital encounter of 08/25/23    Echo Saline Bubble? No    Interpretation Summary    Left Ventricle: The left ventricle is normal in size. Moderately increased ventricular mass. Moderately increased wall thickness. There is moderate concentrict hypertrophy. Normal wall motion. There is normal systolic function.  EF 61% There is normal diastolic function.    Left Atrium: Left atrium is moderately dilated.    Right Ventricle: Normal right ventricular cavity size. Wall thickness is normal. Right ventricle wall motion  is normal. Systolic function is normal.    Mitral Valve: Mildly thickened anterior leaflet. Mild mitral annular calcification.    IVC/SVC: Normal venous pressure at 3 mmHg.    Pericardium: There is an effusion.    Limited echo; no gross pulmonary hypertension but poor visualization of tricuspid signal    No tissue Doppler performed to assess mean left atrial pressure      Results for orders placed during the hospital encounter of 07/27/23    Echo    Interpretation Summary  · Normal systolic function.  · Normal left ventricular diastolic function.  · The estimated ejection fraction is 60%.  · Atrial fibrillation not observed.  · Normal right ventricular size with normal right ventricular systolic function.  · Mild left atrial enlargement.  · Aortic  valve is calcified but there does not appear to be significant aortic stenosis-  · Aortic valve area is cm2; peak velocity is 1.47 m/s; mean gradient is 5 mmHg.  · Mild mitral regurgitation.  · Mild tricuspid regurgitation.  · Normal central venous pressure (3 mmHg).  · The estimated PA systolic pressure is 20 mmHg.      Results for orders placed during the hospital encounter of 05/25/23    Echo    Interpretation Summary  · The left ventricle is normal in size with concentric remodeling and normal systolic function.  · The estimated ejection fraction is 65%.  · Indeterminate left ventricular diastolic function.  · Mild to moderate tricuspid regurgitation.  · Mild pulmonic regurgitation.  · Normal right ventricular size with normal right ventricular systolic function.  · Normal central venous pressure (3 mmHg).  · The estimated PA systolic pressure is 32 mmHg.  · Mild mitral regurgitation.      Results for orders placed during the hospital encounter of 01/15/23    Echo    Interpretation Summary  · The left ventricle is normal in size with moderate concentric hypertrophy and normal systolic function.  · Sinus tachycardia heart rate 110  · The estimated ejection fraction is 70%.  · Indeterminate left ventricular diastolic function with normal left atrial pressure.  · Atrial fibrillation not observed.  · Normal right ventricular size with normal right ventricular systolic function.  · Mild mitral regurgitation.  · Normal central venous pressure (3 mmHg).      Results for orders placed during the hospital encounter of 04/08/22    Echo    Interpretation Summary  · Limited study for wall morgan. several off axis views.  · The estimated ejection fraction is 60%.  · The left ventricle is normal in size with normal systolic function.  · The following segments are hypokinetic: basal inferoseptal and basal inferior. All other segments are normal.  · Normal right ventricular size with normal right ventricular systolic  function.      CURRENT/PREVIOUS VISIT EKG  Results for orders placed or performed during the hospital encounter of 11/04/23   EKG 12-lead    Collection Time: 11/08/23  8:29 AM    Narrative    Test Reason : R07.9,    Vent. Rate : 103 BPM     Atrial Rate : 103 BPM     P-R Int : 162 ms          QRS Dur : 106 ms      QT Int : 350 ms       P-R-T Axes : 067 060 223 degrees     QTc Int : 458 ms    Sinus tachycardia  Minimal voltage criteria for LVH, may be normal variant ( Dimmitt product )  ST and T wave abnormality, consider inferolateral ischemia  Abnormal ECG  When compared with ECG of 05-NOV-2023 20:43,  Sinus rhythm has replaced Atrial fibrillation  Inverted T waves have replaced nonspecific T wave abnormality in Inferior  leads    Referred By: [email or web address]   SELF           Confirmed By:            ASSESSMENT/PLAN:     Active Hospital Problems    Diagnosis    *Acute respiratory failure with hypoxia and hypercarbia    Atrial fibrillation    Pneumonia     COPD exacerbation    Acute on chronic heart failure with preserved ejection fraction (HFpEF)    Hyperkalemia    Leukocytosis    Chronic kidney disease, stage 4 (severe)    DM type 2, controlled, with complication    Essential hypertension, benign       ASSESSMENT & PLAN:   Acute respiratory failure  COPD exacerbation  Pneumonia  Elevated troponin  Acute on chronic HFpEF  HTN  CKD  DM      RECOMMENDATIONS:  Continue PO amiodarone 200 mg BID for PAF  Elevated troponin 2/2 multiple factors: CHF, Hypoxia, infection. Troponin has trended down  Continue aspirin, statin and plavix for history recent PCI (8/29/23).   Consider imdur 30 mg daily for angina  Continue IV lasix for HFpEF exacerbation. Strict I/O.   Blood pressure trend stable  Echocardiogram 8/26/23 showed Efx 60%    Arlin Murray NP  [facility]  Department of Cardiology  Date of Service: 11/08/2023        I have personally interviewed and examined the patient, I have reviewed the Nurse  Practitioner's history and physical, assessment, and plan. I agree with the findings and plan.    Cardiovascular status heart failure stable agree with continuing the IV Lasix till the chest x-ray is clear    Dr. Mundo Rodriges M.D.  [facility]  Department of Cardiology  Date of Service: 11/08/2023  9:41 AM

## 2023-11-08 NOTE — CARE UPDATE
11/07/23 2000   Patient Assessment/Suction   Level of Consciousness (AVPU) alert   Respiratory Effort Normal   Expansion/Accessory Muscles/Retractions no use of accessory muscles;no retractions   All Lung Fields Breath Sounds diminished   Rhythm/Pattern, Respiratory no shortness of breath reported   Cough Frequency infrequent   Cough Type nonproductive   PRE-TX-O2   Device (Oxygen Therapy) nasal cannula with humidification   $ Is the patient on Low Flow Oxygen? Yes   Flow (L/min) 4   SpO2 97 %   Pulse Oximetry Type Continuous   $ Pulse Oximetry - Single Charge Pulse Oximetry - Single   $ Pulse Oximetry - Multiple Charge Pulse Oximetry - Multiple   Pulse (!) 111   Resp (!) 26   Positioning Supine;HOB elevated 30 degrees   Positioning   Positioning/Transfer Devices pillows;in use   Aerosol Therapy   $ Aerosol Therapy Charges Aerosol Treatment   Daily Review of Necessity (SVN) completed   Respiratory Treatment Status (SVN) given   Treatment Route (SVN) mask;oxygen   Patient Position (SVN) HOB elevated   Post Treatment Assessment (SVN) increased aeration   Signs of Intolerance (SVN) none   Education   $ Education Bronchodilator;15 min   Respiratory Evaluation   $ Care Plan Tech Time 15 min   $ Eval/Re-eval Charges Re-evaluation

## 2023-11-08 NOTE — CONSULTS
"[facility]  Adult Nutrition   Consult Note    SUMMARY     Recommendations  Recommendation/Intervention:   1. Advance diet as tolerated to Diabetic 1800 kcal.   2. Recommend Glucerna BID and Carlo BID to assist with wound healing.   3. RD to monitor for diet progression, labs, and status change PRN.    Goals: Patient to receive nutrition within 24-48 hours.  Nutrition Goal Status: progressing towards goal    Communication of RD Recs: reviewed with physician    Dietitian Rounds Brief  MD consult for diet. Patient extubated yesterday; NG feeds stopped ( [facility] @ goal rate of 50 mL/hr) . Low glucose poc this am;  recommendations for diabetic diet as tolerated and ONS to meet needs for healing provided to MD and RN via secure chat. Diet to advance per SLP recommendationsLast  11/08/23.    Diet order:   Current Diet Order: NPO                 Evaluation of Received Nutrient/Fluid Intake  Energy Calories Required: not meeting needs  Protein Required: not meeting needs  Fluid Required: not meeting needs  Tolerance: tolerating     % Intake of Estimated Energy Needs: 0 - 25 %  % Meal Intake: NPO      Intake/Output Summary (Last 24 hours) at 11/8/2023 1027  Last data filed at 11/8/2023 0631  Gross per 24 hour   Intake --   Output 3950 ml   Net -3950 ml        Anthropometrics  Temp: 98.6 °F (37 °C)  Height Method: Estimated  Height: 5' 3" (160 cm)  Height (inches): 63 in  Weight Method: Bed Scale  Weight: 77.7 kg (171 lb 4.8 oz)  Weight (lb): 171.3 lb  Ideal Body Weight (IBW), Female: 115 lb  % Ideal Body Weight, Female (lb): 143.59 %  BMI (Calculated): 30.4  BMI Grade: 25 - 29.9 - overweight       Estimated/Assessed Needs  Weight Used For Calorie Calculations: 68.9 kg (151 lb 14.4 oz)  Energy Calorie Requirements (kcal): 1722 - 2067 / day (25-30 kcal/kg)  Energy Need Method: Kcal/kg  Protein Requirements: 55-82 gm/day (0.8-1.2 gm/kg)  Weight Used For Protein Calculations: 68.9 kg (151 lb 14.4 oz)   "   Estimated Fluid Requirement Method: RDA Method  RDA Method (mL): 1722       Reason for Assessment  Reason For Assessment: consult, RD follow-up  Diagnosis: pulmonary disease  Relevant Medical History: HFpEF, COPD,   CAD, DM2, HTN,    Nutrition/Diet History  Food Allergies: other (see comments) (Strawberries)    Nutrition Risk Screen  Nutrition Risk Screen: other (see comments), unintentional loss of 10 lbs or more in the past 2 months (multiple wounds)       Altered Skin Integrity 11/04/23 1520 Left anterior;lower Leg #1 -Wound Image: Images linked       Altered Skin Integrity 11/04/23 1520 Right anterior;lower Leg #2 -Wound Image: Images linked       Altered Skin Integrity 11/04/23 1520 Left Heel #3 Intact skin with non-blanchable redness of localized area-Wound Image: Images linked       Altered Skin Integrity 11/04/23 1520 Coccyx #4 Full thickness tissue loss. Subcutaneous fat may be visible but bone, tendon or muscle are not exposed-Wound Image: Images linked       Altered Skin Integrity 11/04/23 Right anterior;lower Arm #5-Wound Image: Images linked       Altered Skin Integrity 11/04/23 1520 Left anterior;lower Arm #6 -Wound Image: Images linked  MST Score: 2  Have you recently lost weight without trying?: Unsure  Weight loss score: 2  Have you been eating poorly because of a decreased appetite?: No  Appetite score: 0       Weight History:  Wt Readings from Last 5 Encounters:   11/08/23 77.7 kg (171 lb 4.8 oz)   09/05/23 74.6 kg (164 lb 7.4 oz)   08/28/23 77.2 kg (170 lb 3.1 oz)   07/27/23 66.5 kg (146 lb 11.2 oz)   07/27/23 68 kg (150 lb)        Lab/Procedures/Meds: Pertinent Labs/Meds Reviewed    Medications:Pertinent Medications Reviewed  Scheduled Meds:   albuterol-ipratropium  3 mL Nebulization Q6H    allopurinoL  50 mg Oral Daily    amiodarone  200 mg Oral BID    aspirin  81 mg Oral Daily    atorvastatin  40 mg Oral QHS    cefTRIAXone (ROCEPHIN) IVPB  1 g Intravenous Q24H    clopidogreL  75 mg Oral  "Daily    enoxparin  1 mg/kg Subcutaneous Q24H (treatment, non-standard time)    [START ON 11/9/2023] furosemide  20 mg Oral Daily    mupirocin   Nasal BID    pantoprazole  40 mg Intravenous Daily    potassium, sodium phosphates  1 packet Oral Once    [START ON 11/9/2023] predniSONE  40 mg Oral Daily     Continuous Infusions:   dexmedeTOMIDine (Precedex) infusion (titrating) Stopped (11/06/23 1231)    propofoL Stopped (11/07/23 1000)     PRN Meds:.acetaminophen, albuterol-ipratropium, calcium gluconate IVPB, calcium gluconate IVPB, calcium gluconate IVPB, dextrose 50%, dextrose 50%, glucagon (human recombinant), glucose, glucose, HYDROcodone-acetaminophen, insulin aspart U-100, magnesium sulfate IVPB, magnesium sulfate IVPB, melatonin, metoprolol, morphine, naloxone, nitroGLYCERIN, ondansetron, polyethylene glycol, potassium chloride **AND** potassium chloride **AND** potassium chloride, senna-docusate 8.6-50 mg, simethicone, sodium chloride 0.9%, sodium phosphate 15 mmol in dextrose 5 % (D5W) 250 mL IVPB, sodium phosphate 20.01 mmol in dextrose 5 % (D5W) 250 mL IVPB, sodium phosphate 30 mmol in dextrose 5 % (D5W) 250 mL IVPB    Labs: Pertinent Labs Reviewed  Clinical Chemistry:  Recent Labs   Lab 11/06/23  0401 11/07/23  0406 11/08/23  0344     142 142 146*   K 4.7  4.7 4.2 3.8   CL 97  97 97 100   CO2 38*  38* 37* 37*   *  155* 82 65*   BUN 45*  45* 45* 41*   CREATININE 1.9*  1.9* 1.6* 1.6*   CALCIUM 8.4*  8.4* 8.3* 8.1*   PROT 5.7* 5.7* 5.4*   ALBUMIN 3.0* 3.0* 2.9*   BILITOT 0.3 0.3 0.4   ALKPHOS 59 67 61   AST 15 14 17   ALT 8* 8* 9*   ANIONGAP 7*  7* 8 9   MG 2.2 2.2 2.3   PHOS 4.9* 5.0* 4.2     CBC:   Recent Labs   Lab 11/08/23  0344 11/08/23  0609   WBC 11.26  --    RBC 3.15*  --    HGB 8.4*  --    HCT 28.5* 25*     --    MCV 91  --    MCH 26.7*  --    MCHC 29.5*  --      Lipid Panel:  No results for input(s): "CHOL", "HDL", "LDLCALC", "TRIG", "CHOLHDL" in the last 168 " "hours.  Cardiac Profile:  Recent Labs   Lab 11/04/23  1057   BNP 1,995*     Inflammatory Labs:  No results for input(s): "CRP" in the last 168 hours.  Diabetes:  No results for input(s): "HGBA1C", "POCTGLUCOSE" in the last 168 hours.  Thyroid & Parathyroid:  No results for input(s): "TSH", "FREET4", "R9NOJCG", "J4BPEBV", "THYROIDAB" in the last 168 hours.    Monitor and Evaluation  Food and Nutrient Intake: energy intake, food and beverage intake  Food and Nutrient Adminstration: diet order  Knowledge/Beliefs/Attitudes: food and nutrition knowledge/skill  Physical Activity and Function: nutrition-related ADLs and IADLs  Anthropometric Measurements: weight, weight change, body mass index  Biochemical Data, Medical Tests and Procedures: electrolyte and renal panel, gastrointestinal profile, glucose/endocrine profile, inflammatory profile, lipid profile  Nutrition-Focused Physical Findings: overall appearance     Nutrition Risk  Level of Risk/Frequency of Follow-up: moderate - high     Nutrition Follow-Up  RD Follow-up?: Yes      Chanell Estrada RD, MARIELA 11/08/2023 10:27 AM     "

## 2023-11-08 NOTE — ASSESSMENT & PLAN NOTE
Continue to monitor, patient currently receiving inhalers and Lasix  The patients latest potassium has been reviewed and the results are listed below  Recent Labs   Lab 11/08/23  0344   K 3.8

## 2023-11-08 NOTE — PT/OT/SLP EVAL
"Speech Language Pathology Evaluation  Bedside Swallow    Patient Name:  Marisol Kerr   MRN:  [id number]  Admitting Diagnosis: Acute respiratory failure with hypoxia and hypercarbia    Recommendations:                 General Recommendations:  Dysphagia therapy  Diet recommendations:  Minced & Moist Diet - IDDSI Level 5, Thin liquids - IDDSI Level 0   Aspiration Precautions: 1 bite/sip at a time, Eliminate distractions, HOB to 90 degrees, and Small bites/sips   General Precautions: Standard, aspiration  Communication strategies:   speak on R side    Assessment:     Marisol Kerr is a 78 y.o. female with an SLP diagnosis of mild oropharyngeal Dysphagia.  She presents with intermittent delayed pharyngeal swallow 2/2 suspected decreased sensation from intubation, in addition to inadequate dentition for mastication of complex textures.  Pt w/ increased risk of aspiration 2/2 respiratory status characterized by respiratory rate in high 20s and mild difficulty coordinating breathing/swallowing pattern. Coughing noted after 1 trial of thin liquid over 6 total trials. Rec IDDSI 5- minced and moist diet w/ thin liquids. ST to follow re swallowing precautions and diet tolerance.    History:   Per H&P:  "Marisol Kerr is a 78 y.o. White female   With PMH of HFpEF, COPD,   CAD, DM2, HTN,   who presents with SOB.     Pt currently intubated  Per EMS report, pt was found gasping for air  Said "help me, help me"  then went unresponsive  She was seated, no head trauma  Pt intubated before arrival to ER  No cardiac arrest occured     Pt was DNR, DNI + on hospice  but hospice + DNR/DNI has been revoked by her daughter"    Past Medical History:   Diagnosis Date    CAD (coronary artery disease)     Cancer     colon    CHF (congestive heart failure)     Colitis     Colon cancer     COPD (chronic obstructive pulmonary disease)     Decreased hearing     Diabetes mellitus     Diabetes mellitus, type 2     Hypercholesterolemia     Hypertension     " Hypoxemia     Insomnia     Obesity     Osteoarthritis        Past Surgical History:   Procedure Laterality Date    ANGIOGRAM, CORONARY, WITH LEFT HEART CATHETERIZATION N/A 2/10/2022    Procedure: Angiogram, Coronary, with Left Heart Cath;  Surgeon: Cristian Benjamin MD;  Location: [facility] CATH/EP LAB;  Service: Cardiology;  Laterality: N/A;    CARDIAC CATHETERIZATION      CHOLECYSTECTOMY      COLECTOMY      CORONARY ANGIOGRAPHY N/A 8/29/2023    Procedure: ANGIOGRAM, CORONARY ARTERY;  Surgeon: Nba Riggs MD;  Location: [facility] CATH LAB;  Service: Cardiology;  Laterality: N/A;    CORONARY ANGIOPLASTY      CORONARY STENT PLACEMENT      ERCP N/A 1/16/2023    Procedure: ERCP (ENDOSCOPIC RETROGRADE CHOLANGIOPANCREATOGRAPHY);  Surgeon: Biju Kramer III, MD;  Location: [facility] ENDO;  Service: Endoscopy;  Laterality: N/A;    ESOPHAGOGASTRODUODENOSCOPY N/A 1/29/2023    Procedure: EGD (ESOPHAGOGASTRODUODENOSCOPY);  Surgeon: Aarti Alvarez MD;  Location: [facility];  Service: Endoscopy;  Laterality: N/A;    EXTERNAL EAR SURGERY      EYE SURGERY      FLEXIBLE SIGMOIDOSCOPY N/A 9/19/2019    Procedure: SIGMOIDOSCOPY, FLEXIBLE;  Surgeon: Biju Kramer III, MD;  Location: [facility] ENDO;  Service: Endoscopy;  Laterality: N/A;    HYSTERECTOMY      partial    INSTANTANEOUS WAVE-FREE RATIO (IFR) N/A 8/29/2023    Procedure: Instantaneous Wave-Free Ratio (IFR);  Surgeon: Nba Riggs MD;  Location: [facility] CATH LAB;  Service: Cardiology;  Laterality: N/A;    STENT, DRUG ELUTING, SINGLE VESSEL, CORONARY N/A 8/29/2023    Procedure: Stent, Drug Eluting, Single Vessel, Coronary;  Surgeon: Nba Riggs MD;  Location: [facility] CATH LAB;  Service: Cardiology;  Laterality: N/A;       Social History: Patient lives alone; however, family reports pt and family are in the process of looking for an assisted living facility for pt.    Prior Intubation HX:  11/4-11/7    Modified Barium Swallow: none found in epic or reported by  pt    Chest X-Rays: 11/8/23  Narrative & Impression  CLINICAL HISTORY:  78 years (1945) Female resp failure Respiratory failure     TECHNIQUE:  Portable AP radiograph the chest. One view.     COMPARISON:  Radiograph November 17, 2023.     FINDINGS:  There are faint linear opacities at the lung bases suggestive of atelectasis/scarring, aspiration/pneumonia or trace edema in the appropriate clinical setting. There is blunting of both costophrenic angles consistent with trace pleural effusions and adjacent atelectasis. No pneumothorax is identified. The heart is top normal in size. Osseous structures appear unchanged. The visualized upper abdomen is unremarkable.     Lines and tubes: Interval extubation and removal of the enteric tube. Right-sided subclavian medical infusion port with tip at the level of the SVC.     IMPRESSION:  Interval extubation and removal of the enteric tube, otherwise unchanged radiograph of the chest.    Prior diet: regular, thin; pt NPO since extubation waiting for ST eval.    Occupation/hobbies/homemaking: none stated.    Subjective     Pt awake laying in bed w/ family at bedside. Pt agreeable to CSE. Pt was repositioned by nursing staff prior to CSE.  Patient goals: none stated     Pain/Comfort:       Respiratory Status: Nasal cannula, flow 4 L/min    Objective:   Pt oriented x person, place, situation, and year; she was awake and alert engaging w/ family and staff. She demonstrated adequate ability to follow directives and communicate effectively. She was able to report hx.    Oral Musculature Evaluation  Oral Musculature: WFL  Dentition: edentulous, uses dentures to eat (dentures not present for eval)  Secretion Management: adequate  Mucosal Quality: adequate  Mandibular Strength and Mobility: WFL  Oral Labial Strength and Mobility: WFL  Lingual Strength and Mobility: WFL  Velar Elevation: WFL  Buccal Strength and Mobility: WFL  Volitional Cough: elicited; adequate  Volitional  Swallow: palpated; adequate laryngeal movement  Voice Prior to PO Intake: clear    Bedside Swallow Eval:   Consistencies Assessed:  Thin liquids water via cup and straw  Puree tsp bites applesauce  Mixed consistencies tsp bites diced peaches in thin juice      Oral Phase:   Prolonged/effortful mastication w/ peaches 2/2 dentures not present  Adequate oral clearance and timely oral transport    Pharyngeal Phase:   Coughing in 1/6 trials of water  delayed swallow initiation inconsistently w/ applesauce    Compensatory Strategies  Small sips- improved swallow/breath coordination    Treatment: Pt and family educated re purpose of evaluation, role of SLP, results of evaluation, and recs. Pt and family expressed understanding of recs. Recs shared w/ nursing and MD.      Goals:   Multidisciplinary Problems       SLP Goals          Problem: SLP    Goal Priority Disciplines Outcome   SLP Goal     SLP Ongoing, Progressing   Description: 1. Pt will tolerate IDDSI 5, 0 diet w/o overt s/s aspiration and w/ timely pharyngeal swallow initiation noted via palpation during >95% of PO intake  2. Pt and family will participate in dysphagia education  3. Pt will recall and demonstrate use of swallowing precautions during >95% of  PO intake                       Plan:     Patient to be seen:  3 x/week   Plan of Care expires:     Plan of Care reviewed with:  patient, family, other (see comments) (nursing)   SLP Follow-Up:  Yes       Discharge recommendations:      Barriers to Discharge:  None    Time Tracking:     SLP Treatment Date:   11/08/23  Speech Start Time:  1425  Speech Stop Time:  1450     Speech Total Time (min):  25 min    Billable Minutes: Eval Swallow and Oral Function 25 min    11/08/2023

## 2023-11-08 NOTE — ASSESSMENT & PLAN NOTE
Patient with Hypercapnic Respiratory failure which is Acute on chronic.  she is on home oxygen at 8 LPM. Supplemental oxygen was provided and noted- Oxygen Concentration (%):  [30] 30    .   Signs/symptoms of respiratory failure include- increased work of breathing and respiratory distress. Contributing diagnoses includes - Aspiration and COPD Labs and images were reviewed. Patient Has recent ABG, which has been reviewed. Will treat underlying causes and adjust management of respiratory failure as follows- steroids, continue IV antibiotics, pulmonology consulted.

## 2023-11-08 NOTE — NURSING
Patient's glucose 65 on morning labs. Patient given sugar water at this time.   Glucose 71 after oral sugar water.

## 2023-11-08 NOTE — PROGRESS NOTES
"[facility] Medicine  Progress Note    Patient Name: Marisol Kerr  MRN: [id number]  Patient Class: IP- Inpatient   Admission Date: 11/4/2023  Length of Stay: 4 days  Attending Physician: Radha Maravilla MD  Primary Care Provider: Khadar Dwyer MD        Subjective:     Principal Problem:Acute respiratory failure with hypoxia and hypercarbia        HPI:  Marisol Kerr is a 78 y.o. White female   With PMH of HFpEF, COPD,   CAD, DM2, HTN,   who presents with SOB.     Pt currently intubated  Per EMS report, pt was found gasping for air  Said "help me, help me"  then went unresponsive  She was seated, no head trauma  Pt intubated before arrival to ER  No cardiac arrest occured     Pt was DNR, DNI + on hospice  but hospice + DNR/DNI has been revoked by her daughter      Overview/Hospital Course:  Patient admitted for acute hypercapnic respiratory failure secondary to COPD exacerbation.  Chest x-ray showed possible aspiration.  Patient was intubated before arrival to the emergency room.  Admitted to the ICU.  Pulmonology consulted.  Patient started on pressor support, given IV diuretics for concern of fluid overload.  Started on empiric IV antibiotics.  Steroids and inhalers.  Weaned off pressors.  Difficult weaning off of mechanical ventilation.  Sputum cultures were positive for Klebsiella pneumoniae.  Patient developed atrial fibrillation with rapid RVR, amiodarone drip was started.  Drip was discontinued patient was started on p.o. amiodarone.  Patient was successfully extubated on 11/07/2023.  Was placed on BiPAP at night.  Oxygenation improved.  IV steroids converted to p.o..  IV Lasix down titrated.      Interval History:  Patient seen and examined this morning, reports feeling well.  On 4 L of oxygen with great saturations.  Unaware of why she was in the hospital.  Alert and oriented to self.    Review of Systems   Constitutional:  Negative for chills and diaphoresis.   Respiratory:  Negative " for choking and shortness of breath.    Cardiovascular:  Negative for chest pain and palpitations.     Objective:     Vital Signs (Most Recent):  Temp: 98.6 °F (37 °C) (11/08/23 0301)  Pulse: 92 (11/08/23 0725)  Resp: 16 (11/08/23 0725)  BP: (!) 160/126 (11/08/23 0605)  SpO2: 100 % (11/08/23 0725) Vital Signs (24h Range):  Temp:  [98 °F (36.7 °C)-98.9 °F (37.2 °C)] 98.6 °F (37 °C)  Pulse:  [] 92  Resp:  [15-36] 16  SpO2:  [94 %-100 %] 100 %  BP: ()/() 160/126  Arterial Line BP: ()/(38-80) 108/38     Weight: 77.7 kg (171 lb 4.8 oz)  Body mass index is 30.34 kg/m².    Intake/Output Summary (Last 24 hours) at 11/8/2023 1237  Last data filed at 11/8/2023 0631  Gross per 24 hour   Intake --   Output 3950 ml   Net -3950 ml         Physical Exam  Cardiovascular:      Rate and Rhythm: Normal rate and regular rhythm.   Pulmonary:      Effort: No respiratory distress.      Breath sounds: No wheezing.   Abdominal:      General: There is no distension.      Tenderness: There is no abdominal tenderness.   Neurological:      Mental Status: She is alert.             Significant Labs: All pertinent labs within the past 24 hours have been reviewed.  CBC:   Recent Labs   Lab 11/07/23 0406 11/07/23 0443 11/08/23 0344 11/08/23 0609   WBC 15.50*  --  11.26  --    HGB 9.0*  --  8.4*  --    HCT 29.8* 25* 28.5* 25*     --  251  --      CMP:   Recent Labs   Lab 11/07/23 0406 11/08/23 0344    146*   K 4.2 3.8   CL 97 100   CO2 37* 37*   GLU 82 65*   BUN 45* 41*   CREATININE 1.6* 1.6*   CALCIUM 8.3* 8.1*   PROT 5.7* 5.4*   ALBUMIN 3.0* 2.9*   BILITOT 0.3 0.4   ALKPHOS 67 61   AST 14 17   ALT 8* 9*   ANIONGAP 8 9       Significant Imaging: I have reviewed all pertinent imaging results/findings within the past 24 hours.      Assessment/Plan:      * Acute respiratory failure with hypoxia and hypercarbia  Patient with Hypercapnic Respiratory failure which is Acute on chronic.  she is on home oxygen at 8  LPM. Supplemental oxygen was provided and noted- Oxygen Concentration (%):  [30] 30    .   Signs/symptoms of respiratory failure include- increased work of breathing and respiratory distress. Contributing diagnoses includes - Aspiration and COPD Labs and images were reviewed. Patient Has recent ABG, which has been reviewed. Will treat underlying causes and adjust management of respiratory failure as follows- steroids, continue IV antibiotics, pulmonology consulted.     Atrial fibrillation  No reported history of atrial fibrillation, EKG showed atrial fibrillation with rapid ventricular response  Cardiology notified, patient started amiodarone drip.  This was discontinued, amiodarone p.o. started.  Patient on full-dose Lovenox.    Pneumonia   Sputum cultures grew Klebsiella pneumoniae   discontinue vancomycin and cefepime, Rocephin started by pulmonology        COPD exacerbation  Patient's COPD is with exacerbation noted by continued dyspnea, use of accessory muscles for breathing and worsening of baseline hypoxia currently.  Patient is currently on COPD Pathway. Continue scheduled inhalers Steroids, Antibiotics and Supplemental oxygen and monitor respiratory status closely.     Patient with severe end-stage COPD, currently on home hospice.  On 8 L oxygen at home.  Discussions held with daughter about overall prognosis and difficulty weaning ventilation, daughter verbalized understanding.  Patient was successfully extubated on 11/07/2023.  Steroids wean down to prednisone daily.  Continue inhalers.  Need to discuss with daughter about without resuscitation status.    Acute on chronic heart failure with preserved ejection fraction (HFpEF)  Patient is identified as having Diastolic (HFpEF) heart failure that is Acute on chronic. CHF is currently controlled. Latest ECHO performed and demonstrates- Results for orders placed during the hospital encounter of 08/25/23    Echo Saline Bubble? No    Interpretation Summary     "Left Ventricle: The left ventricle is normal in size. Moderately increased ventricular mass. Moderately increased wall thickness. There is moderate concentrict hypertrophy. Normal wall motion. There is normal systolic function.  EF 61% There is normal diastolic function.    Left Atrium: Left atrium is moderately dilated.    Right Ventricle: Normal right ventricular cavity size. Wall thickness is normal. Right ventricle wall motion  is normal. Systolic function is normal.    Mitral Valve: Mildly thickened anterior leaflet. Mild mitral annular calcification.    IVC/SVC: Normal venous pressure at 3 mmHg.    Pericardium: There is an effusion.    Limited echo; no gross pulmonary hypertension but poor visualization of tricuspid signal    No tissue Doppler performed to assess mean left atrial pressure    Lasix decreased to  20 mg p.o. daily     Recent Labs   Lab 11/04/23  1057   BNP 1,995*   .    Hyperkalemia  Continue to monitor, patient currently receiving inhalers and Lasix  The patients latest potassium has been reviewed and the results are listed below  Recent Labs   Lab 11/08/23  0344   K 3.8           Leukocytosis  Likely secondary to aspiration pneumonia, patient also on steroids which can elevate white blood cells.  , reviewed CBC is white blood cells improving      Chronic kidney disease, stage 4 (severe)  Creatine stable for now. BMP reviewed- noted Estimated Creatinine Clearance: 29.5 mL/min (A) (based on SCr of 1.6 mg/dL (H)). according to latest data. Based on current GFR, CKD stage is stage 3 - GFR 30-59.  Monitor UOP and serial BMP and adjust therapy as needed. Renally dose meds. Avoid nephrotoxic medications and procedures.    DM type 2, controlled, with complication  Patient's FSGs are controlled on current medication regimen.  Last A1c reviewed-   Lab Results   Component Value Date    HGBA1C 5.0 08/25/2023     Most recent fingerstick glucose reviewed- No results for input(s): "POCTGLUCOSE" in the " last 24 hours.  Current correctional scale  Low  Maintain anti-hyperglycemic dose as follows-   Antihyperglycemics (From admission, onward)    Start     Stop Route Frequency Ordered    11/04/23 1439  insulin aspart U-100 pen 0-10 Units         -- SubQ Every 6 hours PRN 11/04/23 1341        Hold Oral hypoglycemics while patient is in the hospital.    Essential hypertension, benign  Holding outpatient blood pressure medication given hypotension and pressor requirement   We will resume once blood pressure is more stable      VTE Risk Mitigation (From admission, onward)         Ordered     enoxaparin injection 70 mg  Every 24 hours         11/04/23 1641     IP VTE HIGH RISK PATIENT  Once         11/04/23 1341     Place sequential compression device  Until discontinued         11/04/23 1341                Discharge Planning   LUZ: 11/9/2023     Code Status: DNR   Is the patient medically ready for discharge?:     Reason for patient still in hospital (select all that apply): Patient trending condition  Discharge Plan A: New Nursing Home placement - assisted care facility            Radha Maravilla MD  Department of Hospital Medicine   [facility]

## 2023-11-09 PROBLEM — L89.95 PRESSURE INJURY, UNSTAGEABLE: Status: ACTIVE | Noted: 2023-11-09

## 2023-11-09 PROBLEM — D72.829 LEUKOCYTOSIS: Status: RESOLVED | Noted: 2019-09-18 | Resolved: 2023-11-09

## 2023-11-09 PROBLEM — E87.0 HYPERNATREMIA: Status: ACTIVE | Noted: 2023-11-09

## 2023-11-09 LAB
ALBUMIN SERPL BCP-MCNC: 3 G/DL (ref 3.5–5.2)
ALP SERPL-CCNC: 58 U/L (ref 55–135)
ALT SERPL W/O P-5'-P-CCNC: 9 U/L (ref 10–44)
ANION GAP SERPL CALC-SCNC: 5 MMOL/L (ref 8–16)
AST SERPL-CCNC: 17 U/L (ref 10–40)
BACTERIA BLD CULT: NORMAL
BACTERIA BLD CULT: NORMAL
BASOPHILS # BLD AUTO: 0.03 K/UL (ref 0–0.2)
BASOPHILS NFR BLD: 0.3 % (ref 0–1.9)
BILIRUB SERPL-MCNC: 0.4 MG/DL (ref 0.1–1)
BUN SERPL-MCNC: 33 MG/DL (ref 8–23)
CALCIUM SERPL-MCNC: 8.4 MG/DL (ref 8.7–10.5)
CHLORIDE SERPL-SCNC: 102 MMOL/L (ref 95–110)
CO2 SERPL-SCNC: 39 MMOL/L (ref 23–29)
CREAT SERPL-MCNC: 1.5 MG/DL (ref 0.5–1.4)
DIFFERENTIAL METHOD: ABNORMAL
EOSINOPHIL # BLD AUTO: 0.2 K/UL (ref 0–0.5)
EOSINOPHIL NFR BLD: 1.7 % (ref 0–8)
ERYTHROCYTE [DISTWIDTH] IN BLOOD BY AUTOMATED COUNT: 15.8 % (ref 11.5–14.5)
EST. GFR  (NO RACE VARIABLE): 35.9 ML/MIN/1.73 M^2
GLUCOSE SERPL-MCNC: 155 MG/DL (ref 70–110)
GLUCOSE SERPL-MCNC: 184 MG/DL (ref 70–110)
GLUCOSE SERPL-MCNC: 81 MG/DL (ref 70–110)
GLUCOSE SERPL-MCNC: 85 MG/DL (ref 70–110)
GLUCOSE SERPL-MCNC: 96 MG/DL (ref 70–110)
HCT VFR BLD AUTO: 28.9 % (ref 37–48.5)
HGB BLD-MCNC: 8.6 G/DL (ref 12–16)
IMM GRANULOCYTES # BLD AUTO: 0.04 K/UL (ref 0–0.04)
IMM GRANULOCYTES NFR BLD AUTO: 0.4 % (ref 0–0.5)
LYMPHOCYTES # BLD AUTO: 1.9 K/UL (ref 1–4.8)
LYMPHOCYTES NFR BLD: 20.4 % (ref 18–48)
MAGNESIUM SERPL-MCNC: 2.3 MG/DL (ref 1.6–2.6)
MCH RBC QN AUTO: 27.7 PG (ref 27–31)
MCHC RBC AUTO-ENTMCNC: 29.8 G/DL (ref 32–36)
MCV RBC AUTO: 93 FL (ref 82–98)
MONOCYTES # BLD AUTO: 0.8 K/UL (ref 0.3–1)
MONOCYTES NFR BLD: 8 % (ref 4–15)
NEUTROPHILS # BLD AUTO: 6.5 K/UL (ref 1.8–7.7)
NEUTROPHILS NFR BLD: 69.2 % (ref 38–73)
NRBC BLD-RTO: 0 /100 WBC
PHOSPHATE SERPL-MCNC: 2.8 MG/DL (ref 2.7–4.5)
PLATELET # BLD AUTO: 209 K/UL (ref 150–450)
PMV BLD AUTO: 11.7 FL (ref 9.2–12.9)
POTASSIUM SERPL-SCNC: 3.4 MMOL/L (ref 3.5–5.1)
PROT SERPL-MCNC: 5.4 G/DL (ref 6–8.4)
RBC # BLD AUTO: 3.11 M/UL (ref 4–5.4)
SODIUM SERPL-SCNC: 146 MMOL/L (ref 136–145)
WBC # BLD AUTO: 9.37 K/UL (ref 3.9–12.7)

## 2023-11-09 PROCEDURE — 83735 ASSAY OF MAGNESIUM: CPT | Performed by: FAMILY MEDICINE

## 2023-11-09 PROCEDURE — 27000221 HC OXYGEN, UP TO 24 HOURS

## 2023-11-09 PROCEDURE — 63600175 PHARM REV CODE 636 W HCPCS: Performed by: FAMILY MEDICINE

## 2023-11-09 PROCEDURE — 99900035 HC TECH TIME PER 15 MIN (STAT)

## 2023-11-09 PROCEDURE — 25000003 PHARM REV CODE 250: Performed by: STUDENT IN AN ORGANIZED HEALTH CARE EDUCATION/TRAINING PROGRAM

## 2023-11-09 PROCEDURE — 25000242 PHARM REV CODE 250 ALT 637 W/ HCPCS: Performed by: INTERNAL MEDICINE

## 2023-11-09 PROCEDURE — 84100 ASSAY OF PHOSPHORUS: CPT | Performed by: FAMILY MEDICINE

## 2023-11-09 PROCEDURE — 97535 SELF CARE MNGMENT TRAINING: CPT

## 2023-11-09 PROCEDURE — 99900031 HC PATIENT EDUCATION (STAT)

## 2023-11-09 PROCEDURE — 85025 COMPLETE CBC W/AUTO DIFF WBC: CPT | Performed by: FAMILY MEDICINE

## 2023-11-09 PROCEDURE — 25000003 PHARM REV CODE 250: Performed by: INTERNAL MEDICINE

## 2023-11-09 PROCEDURE — 99221 PR INITIAL HOSPITAL CARE,LEVL I: ICD-10-PCS | Mod: ,,, | Performed by: SURGERY

## 2023-11-09 PROCEDURE — 97161 PT EVAL LOW COMPLEX 20 MIN: CPT

## 2023-11-09 PROCEDURE — 25000003 PHARM REV CODE 250: Performed by: NURSE PRACTITIONER

## 2023-11-09 PROCEDURE — 94761 N-INVAS EAR/PLS OXIMETRY MLT: CPT

## 2023-11-09 PROCEDURE — 99233 SBSQ HOSP IP/OBS HIGH 50: CPT | Mod: ,,, | Performed by: INTERNAL MEDICINE

## 2023-11-09 PROCEDURE — C9113 INJ PANTOPRAZOLE SODIUM, VIA: HCPCS | Performed by: FAMILY MEDICINE

## 2023-11-09 PROCEDURE — 99233 PR SUBSEQUENT HOSPITAL CARE,LEVL III: ICD-10-PCS | Mod: ,,, | Performed by: INTERNAL MEDICINE

## 2023-11-09 PROCEDURE — 92526 ORAL FUNCTION THERAPY: CPT

## 2023-11-09 PROCEDURE — 80053 COMPREHEN METABOLIC PANEL: CPT | Performed by: FAMILY MEDICINE

## 2023-11-09 PROCEDURE — 25000003 PHARM REV CODE 250: Performed by: FAMILY MEDICINE

## 2023-11-09 PROCEDURE — 36415 COLL VENOUS BLD VENIPUNCTURE: CPT | Performed by: FAMILY MEDICINE

## 2023-11-09 PROCEDURE — 94640 AIRWAY INHALATION TREATMENT: CPT

## 2023-11-09 PROCEDURE — 97530 THERAPEUTIC ACTIVITIES: CPT

## 2023-11-09 PROCEDURE — 99221 1ST HOSP IP/OBS SF/LOW 40: CPT | Mod: ,,, | Performed by: SURGERY

## 2023-11-09 PROCEDURE — 97165 OT EVAL LOW COMPLEX 30 MIN: CPT

## 2023-11-09 PROCEDURE — 63600175 PHARM REV CODE 636 W HCPCS: Performed by: INTERNAL MEDICINE

## 2023-11-09 PROCEDURE — 82962 GLUCOSE BLOOD TEST: CPT

## 2023-11-09 PROCEDURE — 12000002 HC ACUTE/MED SURGE SEMI-PRIVATE ROOM

## 2023-11-09 PROCEDURE — 94660 CPAP INITIATION&MGMT: CPT

## 2023-11-09 RX ORDER — ALPRAZOLAM 0.25 MG/1
0.25 TABLET ORAL NIGHTLY PRN
Status: DISCONTINUED | OUTPATIENT
Start: 2023-11-09 | End: 2023-11-13 | Stop reason: HOSPADM

## 2023-11-09 RX ADMIN — IPRATROPIUM BROMIDE AND ALBUTEROL SULFATE 3 ML: 2.5; .5 SOLUTION RESPIRATORY (INHALATION) at 06:11

## 2023-11-09 RX ADMIN — PANTOPRAZOLE SODIUM 40 MG: 40 INJECTION, POWDER, FOR SOLUTION INTRAVENOUS at 09:11

## 2023-11-09 RX ADMIN — IPRATROPIUM BROMIDE AND ALBUTEROL SULFATE 3 ML: 2.5; .5 SOLUTION RESPIRATORY (INHALATION) at 01:11

## 2023-11-09 RX ADMIN — AMIODARONE HYDROCHLORIDE 200 MG: 200 TABLET ORAL at 09:11

## 2023-11-09 RX ADMIN — IPRATROPIUM BROMIDE AND ALBUTEROL SULFATE 3 ML: 2.5; .5 SOLUTION RESPIRATORY (INHALATION) at 07:11

## 2023-11-09 RX ADMIN — MUPIROCIN 1 G: 20 OINTMENT TOPICAL at 09:11

## 2023-11-09 RX ADMIN — ALPRAZOLAM 0.25 MG: 0.25 TABLET ORAL at 09:11

## 2023-11-09 RX ADMIN — ALLOPURINOL 50 MG: 300 TABLET ORAL at 09:11

## 2023-11-09 RX ADMIN — ENOXAPARIN SODIUM 70 MG: 80 INJECTION SUBCUTANEOUS at 05:11

## 2023-11-09 RX ADMIN — FUROSEMIDE 20 MG: 20 TABLET ORAL at 09:11

## 2023-11-09 RX ADMIN — PREDNISONE 40 MG: 20 TABLET ORAL at 09:11

## 2023-11-09 RX ADMIN — CLOPIDOGREL BISULFATE 75 MG: 75 TABLET, FILM COATED ORAL at 09:11

## 2023-11-09 RX ADMIN — ASPIRIN 81 MG 81 MG: 81 TABLET ORAL at 09:11

## 2023-11-09 RX ADMIN — ATORVASTATIN CALCIUM 40 MG: 40 TABLET, FILM COATED ORAL at 09:11

## 2023-11-09 RX ADMIN — CEFTRIAXONE SODIUM 1 G: 1 INJECTION, POWDER, FOR SOLUTION INTRAMUSCULAR; INTRAVENOUS at 12:11

## 2023-11-09 RX ADMIN — INSULIN ASPART 2 UNITS: 100 INJECTION, SOLUTION INTRAVENOUS; SUBCUTANEOUS at 12:11

## 2023-11-09 RX ADMIN — SENNOSIDES AND DOCUSATE SODIUM 1 TABLET: 50; 8.6 TABLET ORAL at 10:11

## 2023-11-09 RX ADMIN — INSULIN ASPART 2 UNITS: 100 INJECTION, SOLUTION INTRAVENOUS; SUBCUTANEOUS at 05:11

## 2023-11-09 NOTE — CONSULTS
GENERAL SURGERY  INPATIENT CONSULT    REASON FOR CONSULT: Unstageable sacral decubitus wound    HPI: Marisol Kerr is a 78 y.o. female with end-stage COPD on home oxygen, chronic respiratory failure, previously on hospice. Found to be hypercapnic after being brought to the emergency room by EMS. Code status was unknown and she was intubated.  Subsequently extubated. Has AFib.  On amiodarone drip. On full-dose Lovenox. Has pneumonia with Klebsiella on antibiotics. Surgery has been consulted for evaluation of unstageable sacral wound.    I have reviewed the patient's chart including prior progress notes, procedures and testing.     ROS:   Review of Systems    PROBLEM LIST:  Patient Active Problem List   Diagnosis    Hypercholesterolemia    Essential hypertension, benign    Depressive disorder, not elsewhere classified    Coronary atherosclerosis    Persistent disorder of initiating or maintaining sleep    Edema    DM type 2, controlled, with complication    Empyema lung    Hypertension    ASCVD (arteriosclerotic cardiovascular disease)    Gastroesophageal reflux disease without esophagitis    Diabetic peripheral neuropathy    Diabetic peripheral vascular disease    Chronic kidney disease, stage 4 (severe)    Osteoporosis, post-menopausal    Class 2 obesity in adult    Fall    Lung nodule    History of colon cancer    Pure hypercholesterolemia    Adrenal adenoma    Primary osteoarthritis of both hips    Chest pain    Decubitus ulcer of sacral region, stage 1    Bilateral nonobstructing nephrolithiasis    Hypophosphatemia    Primary insomnia    Vitamin D deficiency    Acute on chronic respiratory failure    Hyperkalemia    Venous stasis    Hard of hearing    NSTEMI (non-ST elevated myocardial infarction)    Rectus sheath hematoma    Normocytic anemia    Esophageal dysmotility    Gout    Impaired functional mobility and endurance    Acute on chronic heart failure with preserved ejection fraction (HFpEF)    Multiple  chronic diseases    Varicose veins of unspecified lower extremity with ulcer of unspecified site (CODE)    Unstable angina    Elevated liver enzymes    COPD exacerbation    Acute respiratory failure with hypoxia and hypercarbia    Pneumonia     Anemia    Shortness of breath    Congestive heart failure    Debility    Sacral decubitus ulcer, stage II    Acute on chronic respiratory failure with hypoxia and hypercapnia    Abnormal EKG    Goals of care, counseling/discussion    Acute CHF    Heart failure with preserved ejection fraction    PVD (peripheral vascular disease)    Drug reaction    Elevated troponin    Hypertensive emergency    Fever    Atrial fibrillation    Hypernatremia         HISTORY  Past Medical History:   Diagnosis Date    CAD (coronary artery disease)     Cancer     colon    CHF (congestive heart failure)     Colitis     Colon cancer     COPD (chronic obstructive pulmonary disease)     Decreased hearing     Diabetes mellitus     Diabetes mellitus, type 2     Hypercholesterolemia     Hypertension     Hypoxemia     Insomnia     Obesity     Osteoarthritis        Past Surgical History:   Procedure Laterality Date    ANGIOGRAM, CORONARY, WITH LEFT HEART CATHETERIZATION N/A 2/10/2022    Procedure: Angiogram, Coronary, with Left Heart Cath;  Surgeon: Cristian Benjamin MD;  Location: [facility] CATH/EP LAB;  Service: Cardiology;  Laterality: N/A;    CARDIAC CATHETERIZATION      CHOLECYSTECTOMY      COLECTOMY      CORONARY ANGIOGRAPHY N/A 8/29/2023    Procedure: ANGIOGRAM, CORONARY ARTERY;  Surgeon: Nba Riggs MD;  Location: [facility] CATH LAB;  Service: Cardiology;  Laterality: N/A;    CORONARY ANGIOPLASTY      CORONARY STENT PLACEMENT      ERCP N/A 1/16/2023    Procedure: ERCP (ENDOSCOPIC RETROGRADE CHOLANGIOPANCREATOGRAPHY);  Surgeon: Biju Kramer III, MD;  Location: [facility] ENDO;  Service: Endoscopy;  Laterality: N/A;    ESOPHAGOGASTRODUODENOSCOPY N/A 1/29/2023    Procedure: EGD  (ESOPHAGOGASTRODUODENOSCOPY);  Surgeon: Aarti Alvarez MD;  Location: [facility] ENDO;  Service: Endoscopy;  Laterality: N/A;    EXTERNAL EAR SURGERY      EYE SURGERY      FLEXIBLE SIGMOIDOSCOPY N/A 2019    Procedure: SIGMOIDOSCOPY, FLEXIBLE;  Surgeon: Biju Kramer III, MD;  Location: [facility] ENDO;  Service: Endoscopy;  Laterality: N/A;    HYSTERECTOMY      partial    INSTANTANEOUS WAVE-FREE RATIO (IFR) N/A 2023    Procedure: Instantaneous Wave-Free Ratio (IFR);  Surgeon: Nba Riggs MD;  Location: [facility] CATH LAB;  Service: Cardiology;  Laterality: N/A;    STENT, DRUG ELUTING, SINGLE VESSEL, CORONARY N/A 2023    Procedure: Stent, Drug Eluting, Single Vessel, Coronary;  Surgeon: Nba Riggs MD;  Location: [facility] CATH LAB;  Service: Cardiology;  Laterality: N/A;       Social History     Tobacco Use    Smoking status: Former     Current packs/day: 0.00     Average packs/day: 3.0 packs/day for 50.0 years (150.1 ttl pk-yrs)     Types: Cigarettes     Start date: 3/30/1964     Quit date: 2006     Years since quittin.7    Smokeless tobacco: Never    Tobacco comments:     ex smoker 15 years   Substance Use Topics    Alcohol use: Yes    Drug use: No       Family History   Problem Relation Age of Onset    Febrile seizures Mother     Heart failure Father          MEDS:  No current facility-administered medications on file prior to encounter.     Current Outpatient Medications on File Prior to Encounter   Medication Sig Dispense Refill    albuterol (VENTOLIN HFA) 90 mcg/actuation inhaler Inhale 2 puffs into the lungs every 6 (six) hours as needed for Wheezing or Shortness of Breath. Rescue 36 g 3    albuterol-ipratropium (DUO-NEB) 2.5 mg-0.5 mg/3 mL nebulizer solution Take 3 mLs by nebulization every 4 (four) hours as needed for Wheezing or Shortness of Breath. Rescue 120 each 6    allopurinoL (ZYLOPRIM) 100 MG tablet Take 0.5 tablets (50 mg total) by mouth once daily. 45 tablet 1     ALPRAZolam (XANAX) 0.25 MG tablet Take 1 tablet (0.25 mg total) by mouth every evening. 90 tablet 1    amLODIPine (NORVASC) 10 MG tablet Take 1 tablet (10 mg total) by mouth once daily. 30 tablet 11    aspirin 81 MG Chew Chew and swallow 1 tablet (81 mg total) by mouth once daily. 30 tablet 11    atorvastatin (LIPITOR) 40 MG tablet Take 1 tablet (40 mg total) by mouth once daily. 90 tablet 3    cholestyramine-aspartame (QUESTRAN/PREVALITE LIGHT) 4 gram Powd Take 4 g by mouth once daily.      clopidogreL (PLAVIX) 75 mg tablet Take 1 tablet (75 mg total) by mouth once daily. 30 tablet 11    coenzyme Q10 100 mg capsule Take 100 mg by mouth every morning.      dexAMETHasone (DECADRON) 4 MG Tab Take 4 mg by mouth Daily.      ergocalciferol (VITAMIN D2) 50,000 unit Cap Take 1 capsule (50,000 Units total) by mouth every 7 days. 12 capsule 1    ferrous sulfate 325 (65 FE) MG EC tablet Take 325 mg by mouth once daily.      fish oil-omega-3 fatty acids 300-1,000 mg capsule Take 2 capsules by mouth every morning.      furosemide (LASIX) 20 MG tablet Take 1 tablet (20 mg total) by mouth every other day. TAKE WITH POTASSIUM 30 tablet 4    gabapentin (NEURONTIN) 300 MG capsule Take 300 mg by mouth Daily.      ipratropium-albuteroL (COMBIVENT RESPIMAT)  mcg/actuation inhaler Inhale 2 puffs into the lungs every 4 (four) hours as needed for Shortness of Breath. Rescue 12 g 3    ketoconazole (NIZORAL) 2 % cream Apply 1 application  topically 2 (two) times daily.      magnesium oxide (MAG-OX) 400 mg (241.3 mg magnesium) tablet Take 1 tablet by mouth 2 (two) times daily.      metoprolol succinate (TOPROL-XL) 200 MG 24 hr tablet Take 1 tablet (200 mg total) by mouth once daily. 30 tablet 5    mupirocin (BACTROBAN) 2 % ointment Apply topically 2 (two) times daily. (Patient taking differently: Apply 1 g topically 2 (two) times daily.) 30 g 3    nitroGLYCERIN (NITROSTAT) 0.4 MG SL tablet Place 1 tablet (0.4 mg total) under the tongue  every 5 (five) minutes as needed for Chest pain. Up to 3 doses. If chest pain is not relieved or worsens 3 to 5 minutes after 1 dose, call 911 and seek immediate emergency medical attention. 25 tablet 2    pantoprazole (PROTONIX) 40 MG tablet Take 1 tablet (40 mg total) by mouth once daily. 90 tablet 3    potassium chloride (MICRO-K) 10 MEQ CpSR Take 1 capsule (10 mEq total) by mouth every other day. (TAKE WITH LASIX/FUROSEMIDE) 15 capsule 0    triamcinolone acetonide 0.1% (KENALOG) 0.1 % cream Apply 1 g topically 2 (two) times daily.      umeclidinium (INCRUSE ELLIPTA) 62.5 mcg/actuation inhalation capsule Inhale 62.5 mcg into the lungs every morning. Controller 90 each 3    vit C/E/Zn/coppr/lutein/zeaxan (PRESERVISION AREDS-2 ORAL) Take 1 tablet by mouth once daily.      VITAMIN C 500 MG tablet Take 500 mg by mouth 2 (two) times daily.      [DISCONTINUED] benazepriL (LOTENSIN) 40 MG tablet Take 1 tablet (40 mg total) by mouth once daily. 90 tablet 1    [DISCONTINUED] cimetidine (TAGAMET) 300 MG tablet Take 1 tablet (300 mg total) by mouth every 6 (six) hours. Take 1 tablet (300 mg total) by mouth starting at 6 PM on Sunday April 17, 2022 (the evening before your angiogram procedure). Then take 1 tablet at 12 AM, then take 1 tablet at 6 AM on Monday April 18th. 3 tablet 0    [DISCONTINUED] lisinopriL 10 MG tablet Take 1 tablet (10 mg total) by mouth once daily. HOLD UNTIL OTHERWISE DIRECTED BY PRIMARY CARE PROVIDER 90 tablet 3    [DISCONTINUED] lovastatin (MEVACOR) 40 MG tablet Take 1 tablet (40 mg total) by mouth every other day. 45 tablet 3       ALLERGIES:  Review of patient's allergies indicates:   Allergen Reactions    Iodinated contrast media     Strawberries [strawberry] Hives and Itching         VITALS:  Temp:  [97.9 °F (36.6 °C)-98.6 °F (37 °C)] 98.1 °F (36.7 °C)  Pulse:  [] 99  Resp:  [12-56] 56  SpO2:  [93 %-100 %] 97 %  BP: (123-161)/(55-89) 152/67    I/O last 3 completed shifts:  In: 555  [P.O.:355; IV Piggyback:200]  Out: 3365 [Urine:3365]      PHYSICAL EXAM:  Physical Exam  Vitals reviewed.   Constitutional:       Appearance: She is obese. She is ill-appearing.   Cardiovascular:      Rate and Rhythm: Normal rate. Rhythm irregular.      Pulses: Normal pulses.   Pulmonary:      Effort: Pulmonary effort is normal. No respiratory distress.   Abdominal:      Palpations: There is no mass.      Tenderness: There is no abdominal tenderness.   Musculoskeletal:      Cervical back: Normal range of motion and neck supple. No rigidity.   Skin:     General: Skin is warm.      Coloration: Skin is not jaundiced.   Neurological:      General: No focal deficit present.      Mental Status: She is alert. Mental status is at baseline.             LABS:  Lab Results   Component Value Date    WBC 9.37 11/09/2023    RBC 3.11 (L) 11/09/2023    HGB 8.6 (L) 11/09/2023    HCT 28.9 (L) 11/09/2023    HCT 25 (L) 11/08/2023     11/09/2023     Lab Results   Component Value Date    GLU 85 11/09/2023     (H) 11/09/2023    K 3.4 (L) 11/09/2023     11/09/2023    CO2 39 (H) 11/09/2023    BUN 33 (H) 11/09/2023    CREATININE 1.5 (H) 11/09/2023    CALCIUM 8.4 (L) 11/09/2023     Lab Results   Component Value Date    ALT 9 (L) 11/09/2023    AST 17 11/09/2023    ALKPHOS 58 11/09/2023    BILITOT 0.4 11/09/2023     Lab Results   Component Value Date    MG 2.3 11/09/2023    PHOS 2.8 11/09/2023       STUDIES:  Images and reports were personally reviewed.      ASSESSMENT & PLAN:  78 y.o. female with unstageable sacral decubitus wound  -debridement has been requested for assurance of no underlying infection  -discussed with the patient and her daughter, risks including pain, bleeding, scarring, infection, chronic wound, etc. were reviewed  -will plan for debridement in the operating room under sedation and local anesthetic tomorrow  -NPO after midnight

## 2023-11-09 NOTE — PLAN OF CARE
Pt was accepted to [facility] rehab. They can admit tomorrow if pt is medically clear         11/09/23 1502   Post-Acute Status   Post-Acute Authorization Placement   Post-Acute Placement Status Pending medical clearance/testing

## 2023-11-09 NOTE — ASSESSMENT & PLAN NOTE
Still struggling with breathing-   C/w breathing rx and diuretics    Can be downgraded to med tele

## 2023-11-09 NOTE — NURSING
Follow up   Pictures 11/09/23 per nursing         Possible abscess left buttock      Pushed pictures to Dr Barkley early this morning  agreed with possible need of surgery   Dr Merritt  here to the room to see area  pt was sitting at bedside  and was able to stand with assist  steady with standing   Dr Merritt will order a surgery consult    While speaking with the pt and showed her the wounds  she said the ulcer started about 2 years ago while in another hospital  she had I&D's and it was infected by what she says    Sitting in the chair and chair alarm in place

## 2023-11-09 NOTE — PLAN OF CARE
Spoke to pt's daughter by phone to discuss dc plan. Pt and daughter would like to pursue rehab at [facility].     Rehab referral sent with [facility] as 1st choice.         11/09/23 1001   Post-Acute Status   Post-Acute Authorization Placement   Post-Acute Placement Status Referrals Sent

## 2023-11-09 NOTE — PROGRESS NOTES
"[facility] Medicine  Progress Note    Patient Name: Marisol Kerr  MRN: [id number]  Patient Class: IP- Inpatient   Admission Date: 11/4/2023  Length of Stay: 5 days  Attending Physician: Randolph Merritt MD  Primary Care Provider: Khadar Dwyer MD        Subjective:     Principal Problem:Acute respiratory failure with hypoxia and hypercarbia        HPI:  Marisol Kerr is a 78 y.o. White female   With PMH of HFpEF, COPD,   CAD, DM2, HTN,   who presents with SOB.     Pt currently intubated  Per EMS report, pt was found gasping for air  Said "help me, help me"  then went unresponsive  She was seated, no head trauma  Pt intubated before arrival to ER  No cardiac arrest occured     Pt was DNR, DNI + on hospice  but hospice + DNR/DNI has been revoked by her daughter      Overview/Hospital Course:  Patient admitted for acute hypercapnic respiratory failure secondary to COPD exacerbation.  Chest x-ray showed possible aspiration.  Patient was intubated before arrival to the emergency room.  Admitted to the ICU.  Pulmonology consulted.  Patient started on pressor support, given IV diuretics for concern of fluid overload.  Started on empiric IV antibiotics.  Steroids and inhalers.  Weaned off pressors.  Difficult weaning off of mechanical ventilation.  Sputum cultures were positive for Klebsiella pneumoniae.  Patient developed atrial fibrillation with rapid RVR, amiodarone drip was started.  Drip was discontinued patient was started on p.o. amiodarone.  Patient was successfully extubated on 11/07/2023.  Was placed on BiPAP at night.  Oxygenation improved.  IV steroids converted to p.o..  IV Lasix down titrated.      Interval History: Pt was sitting in chair and was complaining of mild sob, but no chest pain, was telling me she did not sleep last because of horror dreams she had - otherwise no fever, chills, n/v, urine or bowel problem.     Review of Systems   Constitutional:  Negative for activity change, " appetite change, chills and fever.   HENT:  Negative for congestion, ear pain and nosebleeds.    Eyes:  Negative for itching and visual disturbance.   Respiratory:  Negative for apnea, cough, chest tightness, shortness of breath and wheezing.    Cardiovascular:  Negative for chest pain, palpitations and leg swelling.   Gastrointestinal:  Negative for abdominal distention, abdominal pain, constipation, diarrhea, nausea and vomiting.   Genitourinary:  Negative for dysuria and flank pain.   Musculoskeletal:  Negative for back pain.   Neurological:  Negative for dizziness and headaches.     Objective:     Vital Signs (Most Recent):  Temp: 98.1 °F (36.7 °C) (11/09/23 1230)  Pulse: 103 (11/09/23 1313)  Resp: 15 (11/09/23 1313)  BP: 139/65 (11/09/23 1230)  SpO2: 99 % (11/09/23 1313) Vital Signs (24h Range):  Temp:  [97.9 °F (36.6 °C)-98.6 °F (37 °C)] 98.1 °F (36.7 °C)  Pulse:  [] 103  Resp:  [12-43] 15  SpO2:  [93 %-100 %] 99 %  BP: (123-159)/(55-89) 139/65     Weight: 77.7 kg (171 lb 4.8 oz)  Body mass index is 30.34 kg/m².    Intake/Output Summary (Last 24 hours) at 11/9/2023 1530  Last data filed at 11/9/2023 0627  Gross per 24 hour   Intake 555 ml   Output 2415 ml   Net -1860 ml         Physical Exam  Vitals reviewed.   Constitutional:       General: She is not in acute distress.     Appearance: Normal appearance. She is not ill-appearing, toxic-appearing or diaphoretic.   HENT:      Head: Normocephalic and atraumatic.      Mouth/Throat:      Mouth: Mucous membranes are moist.      Pharynx: Oropharynx is clear.   Eyes:      General: No scleral icterus.     Conjunctiva/sclera: Conjunctivae normal.   Neck:      Vascular: No carotid bruit.   Cardiovascular:      Rate and Rhythm: Normal rate and regular rhythm.      Pulses: Normal pulses.      Heart sounds: Normal heart sounds.   Pulmonary:      Effort: Pulmonary effort is normal.      Breath sounds: Normal breath sounds.   Abdominal:      General: Abdomen is flat.  Bowel sounds are normal.      Palpations: Abdomen is soft.   Musculoskeletal:         General: Normal range of motion.   Skin:     General: Skin is warm.      Comments: Sacral ulcer/abscess   Neurological:      General: No focal deficit present.      Mental Status: She is alert and oriented to person, place, and time. Mental status is at baseline.   Psychiatric:         Mood and Affect: Mood normal.         Behavior: Behavior normal.             Significant Labs: All pertinent labs within the past 24 hours have been reviewed.  BMP:   Recent Labs   Lab 11/09/23  0720   GLU 85   *   K 3.4*      CO2 39*   BUN 33*   CREATININE 1.5*   CALCIUM 8.4*   MG 2.3     CBC:   Recent Labs   Lab 11/08/23  0344 11/08/23  0609 11/09/23  0720   WBC 11.26  --  9.37   HGB 8.4*  --  8.6*   HCT 28.5* 25* 28.9*     --  209     CMP:   Recent Labs   Lab 11/08/23  0344 11/09/23  0720   * 146*   K 3.8 3.4*    102   CO2 37* 39*   GLU 65* 85   BUN 41* 33*   CREATININE 1.6* 1.5*   CALCIUM 8.1* 8.4*   PROT 5.4* 5.4*   ALBUMIN 2.9* 3.0*   BILITOT 0.4 0.4   ALKPHOS 61 58   AST 17 17   ALT 9* 9*   ANIONGAP 9 5*     Magnesium:   Recent Labs   Lab 11/08/23  0344 11/09/23  0720   MG 2.3 2.3       Significant Imaging: I have reviewed all pertinent imaging results/findings within the past 24 hours.      Assessment/Plan:      * Acute respiratory failure with hypoxia and hypercarbia  Still struggling with breathing-   C/w breathing rx and diuretics    Can be downgraded to med tele    Hypernatremia  From dehydration -   Encourage oral intake      Atrial fibrillation  No reported history of atrial fibrillation, EKG showed atrial fibrillation with rapid ventricular response  Cardiology notified, patient started amiodarone drip.  This was discontinued, amiodarone p.o. started.  Patient on full-dose Lovenox.    Pneumonia   Sputum cultures grew Klebsiella pneumoniae   discontinue vancomycin and cefepime, Rocephin started by  pulmonology        COPD exacerbation  C/w Abx, breathing rx, steroids and oxygen as needed    Acute on chronic heart failure with preserved ejection fraction (HFpEF)  Patient is identified as having Diastolic (HFpEF) heart failure that is Acute on chronic. CHF is currently controlled. Latest ECHO performed and demonstrates- Results for orders placed during the hospital encounter of 08/25/23    Echo Saline Bubble? No    Interpretation Summary    Left Ventricle: The left ventricle is normal in size. Moderately increased ventricular mass. Moderately increased wall thickness. There is moderate concentrict hypertrophy. Normal wall motion. There is normal systolic function.  EF 61% There is normal diastolic function.    Left Atrium: Left atrium is moderately dilated.    Right Ventricle: Normal right ventricular cavity size. Wall thickness is normal. Right ventricle wall motion  is normal. Systolic function is normal.    Mitral Valve: Mildly thickened anterior leaflet. Mild mitral annular calcification.    IVC/SVC: Normal venous pressure at 3 mmHg.    Pericardium: There is an effusion.    Limited echo; no gross pulmonary hypertension but poor visualization of tricuspid signal    No tissue Doppler performed to assess mean left atrial pressure    Lasix decreased to  20 mg p.o. daily     Recent Labs   Lab 11/04/23  1057   BNP 1,995*   .    Hyperkalemia  resolved      Chronic kidney disease, stage 4 (severe)  Creatine stable for now. BMP reviewed- noted Estimated Creatinine Clearance: 29.5 mL/min (A) (based on SCr of 1.6 mg/dL (H)). according to latest data. Based on current GFR, CKD stage is stage 3 - GFR 30-59.  Monitor UOP and serial BMP and adjust therapy as needed. Renally dose meds. Avoid nephrotoxic medications and procedures.    DM type 2, controlled, with complication  Patient's FSGs are controlled on current medication regimen.  Last A1c reviewed-   Lab Results   Component Value Date    HGBA1C 5.0 08/25/2023  "    Most recent fingerstick glucose reviewed- No results for input(s): "POCTGLUCOSE" in the last 24 hours.  Current correctional scale  Low  Maintain anti-hyperglycemic dose as follows-   Antihyperglycemics (From admission, onward)    Start     Stop Route Frequency Ordered    11/04/23 1439  insulin aspart U-100 pen 0-10 Units         -- SubQ Every 6 hours PRN 11/04/23 1341        Hold Oral hypoglycemics while patient is in the hospital.    Essential hypertension, benign  Holding outpatient blood pressure medication given hypotension and pressor requirement   We will resume once blood pressure is more stable      VTE Risk Mitigation (From admission, onward)         Ordered     enoxaparin injection 70 mg  Every 24 hours         11/04/23 1641     IP VTE HIGH RISK PATIENT  Once         11/04/23 1341     Place sequential compression device  Until discontinued         11/04/23 1341                Discharge Planning   LUZ: 11/13/2023     Code Status: DNR   Is the patient medically ready for discharge?:     Reason for patient still in hospital (select all that apply): Treatment  Discharge Plan A: New Nursing Home placement - group home care facility            Critical care time spent on the evaluation and treatment of severe organ dysfunction, review of pertinent labs and imaging studies, discussions with consulting providers and discussions with patient/family: 30 minutes.      Randolph Merritt MD  Department of Hospital Medicine   [facility]  "

## 2023-11-09 NOTE — PLAN OF CARE
CARRI for pt's daughter to discuss dc plan. Went to pt's room and she was working with PT. CM to follow         11/09/23 0903   Post-Acute Status   Post-Acute Authorization Placement   Post-Acute Placement Status Patient List Provided

## 2023-11-09 NOTE — CARE UPDATE
11/09/23 0703   Patient Assessment/Suction   Level of Consciousness (AVPU) alert   Respiratory Effort Unlabored   All Lung Fields Breath Sounds clear;diminished   Skin Integrity   $ Wound Care Tech Time 15 min   Area Observed Bridge of nose   Skin Appearance without discoloration   PRE-TX-O2   Device (Oxygen Therapy) nasal cannula   $ Is the patient on Low Flow Oxygen? Yes   Flow (L/min) 3   SpO2 99 %   Pulse Oximetry Type Continuous   $ Pulse Oximetry - Multiple Charge Pulse Oximetry - Multiple   Pulse 89   Resp 15   BP (!) 159/70   Aerosol Therapy   $ Aerosol Therapy Charges Aerosol Treatment   Daily Review of Necessity (SVN) completed   Respiratory Treatment Status (SVN) given   Treatment Route (SVN) mask   Patient Position (SVN) semi-Solorzano's   Post Treatment Assessment (SVN) increased aeration   Signs of Intolerance (SVN) none   Breath Sounds Post-Respiratory Treatment   Throughout All Fields Post-Treatment aeration increased   Post-treatment Heart Rate (beats/min) 85   Post-treatment Resp Rate (breaths/min) 15   Preset CPAP/BiPAP Settings   $ CPAP/BiPAP Daily Charge BiPAP/CPAP Daily   Education   $ Education Bronchodilator;15 min   Respiratory Evaluation   $ Care Plan Tech Time 15 min

## 2023-11-09 NOTE — PROGRESS NOTES
Progress Note  PULMONARY    Admit Date: 11/4/2023 11/09/2023  History of Present Illness:  Pt is a 77 yo female with end stage COPD, chronic resp failure on 8L home O2 who is on hospice. She called EMS and was in distress then became unresponsive. She was found to be hypercapneic intubated in ED before her hospice status was known. Her daughter is at bedside and wishes to continue vent support for now hoping she may improve.  Pt has been at home on hospice but daughter states she wants her to go to NH as she hasn't been doing well at home.    SUBJECTIVE:     11/5- continues on vent, with SAT/SBT pt wakes and follows commands but tachypneic, tachycardic and hypertensive so failed SBT. Daughter at bedside actively participating. Pt hard of hearing and has her hearing aid R ear    11/6 patient lasted 15 minutes on her sedation vacation and spontaneous breathing trial this morning.  I do not have an ABG.  That is being corrected.  The patient was on hospice.  I have spoken with her daughter who states EMS was called 1st, not hospice.  She was intubated before hospice was ever notified.  The daughter would like to give her 4-7 days on the ventilator and see if she can improve.  I told her that I am not hopeful of this.  The patient was on 8 L of oxygen at home.    11/7 the patient failed her sedation vacation.  The nurse reports 5 minutes, the Respiratory therapist reports 20.  We will repeat it.      11/8 the patient was successfully extubated yesterday.  She wore BiPAP overnight at 12/6.  Her ABG this morning was good.  Tried to talk to her about her code/intubation situation but could not get understanding from the patient.  Will try to revisit this when her daughter gets here.    11/9 the patient does not feel well this morning she feels yucky.  Her breathing is okay.  Physical therapy is here to evaluate her.      OBJECTIVE:     Vitals (Most recent):  Vitals:    11/09/23 0703   BP: (!) 159/70   Pulse: 89   Resp:  15   Temp:        Vitals (24 hour range):  Temp:  [97.9 °F (36.6 °C)-98.6 °F (37 °C)]   Pulse:  []   Resp:  [9-53]   BP: (131-171)/(54-89)   SpO2:  [93 %-100 %]   Arterial Line BP: ()/(17-83)       Intake/Output Summary (Last 24 hours) at 11/9/2023 0904  Last data filed at 11/9/2023 0627  Gross per 24 hour   Intake 555 ml   Output 2415 ml   Net -1860 ml          PHYSICAL EXAM  GENERAL: Older patient in no distress, on nasal cannula.  HEENT: Pupils equal and reactive. Extraocular movements intact. Nose   Pharynx moist  NECK: Supple.   HEART: Regular rate and rhythm. No murmur or gallop auscultated.  LUNGS:  There are clear to auscultation..  Lung excursion symmetrical. No change in fremitus.   ABDOMEN: Bowel sounds present. Non-tender, no masses palpated.  : Normal anatomy.  Montes with yellow urine  EXTREMITIES: Normal muscle tone and joint movement, no cyanosis or clubbing.  The heels are looking better.  There is an eschar over the 3rd toe on the right foot.  Right midline, left arterial line  LYMPHATICS: No adenopathy palpated, tr edema.  SKIN: Dry multiple abrasions and eschars, decubitus over her coccyx has progressed terribly to a big thick eschar with the appearance of a Stephane lesion.  NEURO:  Awake and interactive but confused  PSYCH:  Quiet      Radiographs reviewed: view by direct vision   Chest x-ray 11/08/2023  There are faint linear opacities at the lung bases suggestive of atelectasis/scarring, aspiration/pneumonia or trace edema in the appropriate clinical setting. There is blunting of both costophrenic angles consistent with trace pleural effusions and adjacent atelectasis. No pneumothorax is identified. The heart is top normal in size. Osseous structures appear unchanged. The visualized upper abdomen is unremarkable       TTE 8/26/23-     Left Ventricle: The left ventricle is normal in size. Moderately increased ventricular mass. Moderately increased wall thickness. There is moderate  concentrict hypertrophy. Normal wall motion. There is normal systolic function.  EF 61% There is normal diastolic function.    Left Atrium: Left atrium is moderately dilated.    Right Ventricle: Normal right ventricular cavity size. Wall thickness is normal. Right ventricle wall motion  is normal. Systolic function is normal.    Mitral Valve: Mildly thickened anterior leaflet. Mild mitral annular calcification.    IVC/SVC: Normal venous pressure at 3 mmHg.    Pericardium: There is an effusion.    Limited echo; no gross pulmonary hypertension but poor visualization of tricuspid signal    No tissue Doppler performed to assess mean left atrial pressure    Labs     Recent Labs   Lab 11/09/23  0720   WBC 9.37   HGB 8.6*   HCT 28.9*        Recent Labs   Lab 11/09/23  0720   *   K 3.4*      CO2 39*   BUN 33*   CREATININE 1.5*   GLU 85   CALCIUM 8.4*   MG 2.3   PHOS 2.8   AST 17   ALT 9*   ALKPHOS 58   BILITOT 0.4   PROT 5.4*   ALBUMIN 3.0*     Microbiology Results (last 7 days)       Procedure Component Value Units Date/Time    Blood Culture #2 **CANNOT BE ORDERED STAT** [[id number]] Collected: 11/04/23 1552    Order Status: Completed Specimen: Blood Updated: 11/08/23 1632     Blood Culture, Routine No Growth to date      No Growth to date      No Growth to date      No Growth to date      No Growth to date    Narrative:      Collection has been rescheduled by [facility] at 11/04/2023 12:28 Reason:   Unable to collect 2nd set  Collection has been rescheduled by [facility] at 11/04/2023 12:28 Reason:   Unable to collect 2nd set    Blood Culture #1 **CANNOT BE ORDERED STAT** [[id number]] Collected: 11/04/23 1204    Order Status: Completed Specimen: Blood Updated: 11/08/23 1432     Blood Culture, Routine No Growth to date      No Growth to date      No Growth to date      No Growth to date      No Growth to date    Urine culture [[id number]] Collected: 11/04/23 1134    Order Status: Completed Specimen: Urine from Clean  Catch Updated: 11/06/23 1646     Urine Culture, Routine No growth    Culture, Respiratory with Gram Stain [[id number]]  (Abnormal)  (Susceptibility) Collected: 11/04/23 1122    Order Status: Completed Specimen: Respiratory from Sputum Updated: 11/06/23 0706     Respiratory Culture KLEBSIELLA PNEUMONIAE  Few       Gram Stain (Respiratory) <10 epithelial cells per low power field.     Gram Stain (Respiratory) Few WBC's     Gram Stain (Respiratory) Few Gram negative rods    MRSA Screen by PCR [[id number]]  (Abnormal) Collected: 11/04/23 1609    Order Status: Completed Specimen: Nasopharyngeal Swab from Nasal Updated: 11/04/23 1736     MRSA SCREEN BY PCR Positive     Comment: Positive MRSA by PCR called and verbal readback obtained from Ines Alonzo, ICU, RN. by [facility] 11/04/2023 17:36         Narrative:      Positive MRSA by PCR called and verbal readback obtained from Ines Alonzo ICU, RN. by [facility] 11/04/2023 17:36    Urine Culture High Risk [[id number]]     Order Status: Completed Specimen: Urine             Impression:  Active Hospital Problems    Diagnosis  POA    *Acute respiratory failure with hypoxia and hypercarbia [J96.01, J96.02]  Yes    Atrial fibrillation [I48.91]  No    Pneumonia  [J18.9]  Yes    COPD exacerbation [J44.1]  Yes    Acute on chronic heart failure with preserved ejection fraction (HFpEF) [I50.33]  Yes    Hyperkalemia [E87.5]  Yes    Leukocytosis [D72.829]  Yes    Chronic kidney disease, stage 4 (severe) [N18.4]  Yes    DM type 2, controlled, with complication [E11.8]  Yes    Essential hypertension, benign [I10]  Yes      Resolved Hospital Problems   No resolved problems to display.           Assessment/Plan:     Acute on chronic hypercapneic/hypoxemic respiratory failure with mechanical ventilation  - on 8 L of oxygen at home  - extubated and on 3 L nasal cannula  - slept on BiPAP, will continue this  Acute on chronic diastolic CHF  Bilateral pleural effusions  - on Lasix 20mg po, creatinine  stable  - continuing to diurese  COPD with acute exacerbation  - resolved  - no wheezing auscultated  - continue DuoNebs to q.6  - wean prednisone  Right lower lobe pneumonia  - Klebsiella pneumoniae  - discontinue Rocephin she has had 5 days of antibiotics  Chest pain this morning  - no acute changes on EKG  - known coronary artery disease and recent stents  - she is anticoagulated and receiving Plavix and aspirin  Acute on chronic renal failure  - creatinine stable  Atrial fibrillation  - on amiodarone  Coronary artery disease  - recent stents  Anemia  - chronic disease  Hyperglycemia and diabetes mellitus  Hypernatremia  - continue Lasix 20 p.o.  - begin oral diet  Troponin leak  Mild hypoalbuminemia  Decubiti to heels and sacrum  - offload pressure to heels  - right heel looks normal again, left heel is still a little red  - sacral decubitus has progressed dramatically to an eschar and Stephane lesion appearance  Leukocytosis  - resolved  Dysphagia  - modified diet noted    Patient was a hospice patient on admission.  Will need to talk with her daughter we have left her a DNR.  Plans for skilled nursing facility to nursing home care noted  The patient is appropriate to be back in hospice      Will continue current dose of enoxaparin with the AFib on this admission  Protonix for GI prophylaxis

## 2023-11-09 NOTE — PT/OT/SLP EVAL
Occupational Therapy   Evaluation    Name: Marisol Kerr  MRN: [id number]  Admitting Diagnosis: Acute respiratory failure with hypoxia and hypercarbia  Recent Surgery: Procedure(s) (LRB):  DEBRIDEMENT, WOUND, SACRUM (N/A)      Recommendations:     Discharge Recommendations: Moderate Intensity Therapy  Discharge Equipment Recommendations:  none  Barriers to discharge:   (increased assist with ADLs and mobility; lives alone)    Assessment:     Marisol Kerr is a 78 y.o. female with a medical diagnosis of Acute respiratory failure with hypoxia and hypercarbia.  Pt agreeable to OT evaluation this AM. Performance deficits affecting function: weakness, impaired endurance, impaired self care skills, impaired functional mobility, gait instability, impaired balance, decreased upper extremity function, decreased lower extremity function, decreased safety awareness, impaired skin, impaired cardiopulmonary response to activity.      Rehab Prognosis: Fair; patient would benefit from acute skilled OT services to address these deficits and reach maximum level of function.       Plan:     Patient to be seen 5 x/week to address the above listed problems via self-care/home management, therapeutic activities, therapeutic exercises  Plan of Care Expires: 12/09/23  Plan of Care Reviewed with: patient    Subjective     Chief Complaint: none stated  Patient/Family Comments/goals: none stated    Occupational Profile:  Living Environment: Pt lives alone in a 1 story home with threshold NAYE.  Previous level of function: Mod I with ADLs, IADLs, and mobility; short distance ambulation; pt cooks and cleans; daughter grocery shops  Roles and Routines: mother  Equipment Used at Home: walker, rolling, wheelchair, shower chair, cane, straight, 3-in-1 commode  Assistance upon Discharge: yes, from facility    Pain/Comfort:  Pain Rating 1: 0/10    Patients cultural, spiritual, Hindu conflicts given the current situation:      Objective:      Communicated with: nursing prior to session.  Patient found up in chair with chair check, blood pressure cuff, daniel catheter, oxygen, peripheral IV, pulse ox (continuous), telemetry upon OT entry to room.    General Precautions: Standard, fall  Orthopedic Precautions: N/A  Braces: N/A  Respiratory Status: Nasal cannula, flow 3 L/min    Occupational Performance:    Activities of Daily Living:  Feeding:  independence per pt  Grooming: setup assistance to brush teeth and to wash face seated in chair    Toileting: daniel      Cognitive/Visual Perceptual:  Cognitive/Psychosocial Skills:     -       Follows Commands/attention:Follows one-step commands  -       Communication: clear/fluent  -       Safety awareness/insight to disability: impaired   -       Mood/Affect/Coping skills/emotional control: Appropriate to situation, Cooperative, and Pleasant    Physical Exam:  Upper Extremity Range of Motion:     -       Right Upper Extremity: WFL  -       Left Upper Extremity: WFL  Upper Extremity Strength:    -       Right Upper Extremity: WFL  -       Left Upper Extremity: WFL   Strength:    -       Right Upper Extremity: WFL  -       Left Upper Extremity: WFL  Fine Motor Coordination:    -       Intact  Gross motor coordination:   WFL    AMPAC 6 Click ADL:  AMPAC Total Score: 16    Treatment & Education:  Pt educated on role of OT/POC, importance of OOB/EOB activity, use of call bell, and safety during ADLs, transfers, and functional mobility.    Patient left up in chair with all lines intact, call button in reach, and chair alarm on    GOALS:   Multidisciplinary Problems       Occupational Therapy Goals          Problem: Occupational Therapy    Goal Priority Disciplines Outcome Interventions   Occupational Therapy Goal     OT, PT/OT     Description: Goals to be met by: 12/9/23     Patient will increase functional independence with ADLs by performing:    UE Dressing with Supervision.  LE Dressing with Supervision and  Assistive Devices as needed.  Grooming while seated with Supervision.  Toileting from bedside commode with Supervision for hygiene and clothing management.   Toilet transfer to bedside commode with Supervision.                         History:     Past Medical History:   Diagnosis Date    CAD (coronary artery disease)     Cancer     colon    CHF (congestive heart failure)     Colitis     Colon cancer     COPD (chronic obstructive pulmonary disease)     Decreased hearing     Diabetes mellitus     Diabetes mellitus, type 2     Hypercholesterolemia     Hypertension     Hypoxemia     Insomnia     Obesity     Osteoarthritis          Past Surgical History:   Procedure Laterality Date    ANGIOGRAM, CORONARY, WITH LEFT HEART CATHETERIZATION N/A 2/10/2022    Procedure: Angiogram, Coronary, with Left Heart Cath;  Surgeon: Cristian Benjamin MD;  Location: [facility] CATH/EP LAB;  Service: Cardiology;  Laterality: N/A;    CARDIAC CATHETERIZATION      CHOLECYSTECTOMY      COLECTOMY      CORONARY ANGIOGRAPHY N/A 8/29/2023    Procedure: ANGIOGRAM, CORONARY ARTERY;  Surgeon: Nba Riggs MD;  Location: [facility] CATH LAB;  Service: Cardiology;  Laterality: N/A;    CORONARY ANGIOPLASTY      CORONARY STENT PLACEMENT      ERCP N/A 1/16/2023    Procedure: ERCP (ENDOSCOPIC RETROGRADE CHOLANGIOPANCREATOGRAPHY);  Surgeon: Biju Kramer III, MD;  Location: [facility];  Service: Endoscopy;  Laterality: N/A;    ESOPHAGOGASTRODUODENOSCOPY N/A 1/29/2023    Procedure: EGD (ESOPHAGOGASTRODUODENOSCOPY);  Surgeon: Aarti Alvarez MD;  Location: [facility];  Service: Endoscopy;  Laterality: N/A;    EXTERNAL EAR SURGERY      EYE SURGERY      FLEXIBLE SIGMOIDOSCOPY N/A 9/19/2019    Procedure: SIGMOIDOSCOPY, FLEXIBLE;  Surgeon: Biju Kramer III, MD;  Location: [facility];  Service: Endoscopy;  Laterality: N/A;    HYSTERECTOMY      partial    INSTANTANEOUS WAVE-FREE RATIO (IFR) N/A 8/29/2023    Procedure: Instantaneous Wave-Free Ratio (IFR);   Surgeon: Nba Riggs MD;  Location: [facility] CATH LAB;  Service: Cardiology;  Laterality: N/A;    STENT, DRUG ELUTING, SINGLE VESSEL, CORONARY N/A 8/29/2023    Procedure: Stent, Drug Eluting, Single Vessel, Coronary;  Surgeon: Nba Riggs MD;  Location: [facility] CATH LAB;  Service: Cardiology;  Laterality: N/A;       Time Tracking:     OT Date of Treatment: 11/09/23  OT Start Time: 0938  OT Stop Time: 0953  OT Total Time (min): 15 min    Billable Minutes:Evaluation 7  Self Care/Home Management 8    11/9/2023

## 2023-11-09 NOTE — PT/OT/SLP PROGRESS
"Speech Language Pathology Treatment    Patient Name:  Marsiol Kerr   MRN:  [id number]  Admitting Diagnosis: Acute respiratory failure with hypoxia and hypercarbia    Recommendations:                 General Recommendations:  Dysphagia therapy  Diet recommendations:  Minced & Moist Diet - IDDSI Level 5, Liquid Diet Level: Thin liquids - IDDSI Level 0   Aspiration Precautions:  Effortful swallow/bolus prep set, 1 bite/sip at a time, Eliminate distractions, HOB to 90 degrees, Meds crushed in puree, Small bites/sips, and Wear oxygen during intake   General Precautions: Standard, aspiration  Communication strategies:   speak on R side    Assessment:     Marisol Kerr is a 78 y.o. female with an SLP diagnosis of Dysphagia.  She presents with intermittent coughing during PO intake. Improved tolerance w/ use of bolus prep set and effortful swallow. Rec strict aspiration precautions and 4x/week during admit. Rec low v. Mod intensity ST at d'c.    Subjective     Pt awake sitting in bedside chair talking on phone. Agreeable to ST.  Patient goals: "I'll do this swallow thing if it means I'm safe"     Pain/Comfort:       Respiratory Status: Nasal cannula, flow 3 L/min    Objective:     Has the patient been evaluated by SLP for swallowing?   Yes  Keep patient NPO? No   Current Respiratory Status:        Pt observed during PO trials of water via cup and straw and tsp bites applesauce. Coughing noted after initial cup edge sip; small sip w/ bolus prep and effortful swallow improved tolerance. No overt s/s aspiration during applesauce and straw sip trials w/ use of bolus prep set and effortful swallow. Pt demonstrated understanding of precautions.    Goals:   Multidisciplinary Problems       SLP Goals          Problem: SLP    Goal Priority Disciplines Outcome   SLP Goal     SLP Ongoing, Progressing   Description: 1. Pt will tolerate IDDSI 5, 0 diet w/o overt s/s aspiration and w/ timely pharyngeal swallow initiation noted via palpation " during >95% of PO intake  2. Pt and family will participate in dysphagia education  3. Pt will recall and demonstrate use of swallowing precautions during >95% of  PO intake                       Plan:     Patient to be seen:  4 x/week   Plan of Care expires:     Plan of Care reviewed with:  patient   SLP Follow-Up:  Yes       Discharge recommendations:  Moderate Intensity Therapy   Barriers to Discharge:  Level of Skilled Assistance Needed w/ swallowing safety    Time Tracking:     SLP Treatment Date:   11/09/23  Speech Start Time:  1128  Speech Stop Time:  1146     Speech Total Time (min):  18 min    Billable Minutes: Treatment Swallowing Dysfunction 18 min    11/09/2023

## 2023-11-09 NOTE — SUBJECTIVE & OBJECTIVE
Interval History: Pt was sitting in chair and was complaining of mild sob, but no chest pain, was telling me she did not sleep last because of horror dreams she had - otherwise no fever, chills, n/v, urine or bowel problem.     Review of Systems   Constitutional:  Negative for activity change, appetite change, chills and fever.   HENT:  Negative for congestion, ear pain and nosebleeds.    Eyes:  Negative for itching and visual disturbance.   Respiratory:  Negative for apnea, cough, chest tightness, shortness of breath and wheezing.    Cardiovascular:  Negative for chest pain, palpitations and leg swelling.   Gastrointestinal:  Negative for abdominal distention, abdominal pain, constipation, diarrhea, nausea and vomiting.   Genitourinary:  Negative for dysuria and flank pain.   Musculoskeletal:  Negative for back pain.   Neurological:  Negative for dizziness and headaches.     Objective:     Vital Signs (Most Recent):  Temp: 98.1 °F (36.7 °C) (11/09/23 1230)  Pulse: 103 (11/09/23 1313)  Resp: 15 (11/09/23 1313)  BP: 139/65 (11/09/23 1230)  SpO2: 99 % (11/09/23 1313) Vital Signs (24h Range):  Temp:  [97.9 °F (36.6 °C)-98.6 °F (37 °C)] 98.1 °F (36.7 °C)  Pulse:  [] 103  Resp:  [12-43] 15  SpO2:  [93 %-100 %] 99 %  BP: (123-159)/(55-89) 139/65     Weight: 77.7 kg (171 lb 4.8 oz)  Body mass index is 30.34 kg/m².    Intake/Output Summary (Last 24 hours) at 11/9/2023 1530  Last data filed at 11/9/2023 0627  Gross per 24 hour   Intake 555 ml   Output 2415 ml   Net -1860 ml         Physical Exam  Vitals reviewed.   Constitutional:       General: She is not in acute distress.     Appearance: Normal appearance. She is not ill-appearing, toxic-appearing or diaphoretic.   HENT:      Head: Normocephalic and atraumatic.      Mouth/Throat:      Mouth: Mucous membranes are moist.      Pharynx: Oropharynx is clear.   Eyes:      General: No scleral icterus.     Conjunctiva/sclera: Conjunctivae normal.   Neck:      Vascular: No  carotid bruit.   Cardiovascular:      Rate and Rhythm: Normal rate and regular rhythm.      Pulses: Normal pulses.      Heart sounds: Normal heart sounds.   Pulmonary:      Effort: Pulmonary effort is normal.      Breath sounds: Normal breath sounds.   Abdominal:      General: Abdomen is flat. Bowel sounds are normal.      Palpations: Abdomen is soft.   Musculoskeletal:         General: Normal range of motion.   Skin:     General: Skin is warm.      Comments: Sacral ulcer/abscess   Neurological:      General: No focal deficit present.      Mental Status: She is alert and oriented to person, place, and time. Mental status is at baseline.   Psychiatric:         Mood and Affect: Mood normal.         Behavior: Behavior normal.             Significant Labs: All pertinent labs within the past 24 hours have been reviewed.  BMP:   Recent Labs   Lab 11/09/23  0720   GLU 85   *   K 3.4*      CO2 39*   BUN 33*   CREATININE 1.5*   CALCIUM 8.4*   MG 2.3     CBC:   Recent Labs   Lab 11/08/23  0344 11/08/23  0609 11/09/23  0720   WBC 11.26  --  9.37   HGB 8.4*  --  8.6*   HCT 28.5* 25* 28.9*     --  209     CMP:   Recent Labs   Lab 11/08/23  0344 11/09/23  0720   * 146*   K 3.8 3.4*    102   CO2 37* 39*   GLU 65* 85   BUN 41* 33*   CREATININE 1.6* 1.5*   CALCIUM 8.1* 8.4*   PROT 5.4* 5.4*   ALBUMIN 2.9* 3.0*   BILITOT 0.4 0.4   ALKPHOS 61 58   AST 17 17   ALT 9* 9*   ANIONGAP 9 5*     Magnesium:   Recent Labs   Lab 11/08/23  0344 11/09/23  0720   MG 2.3 2.3       Significant Imaging: I have reviewed all pertinent imaging results/findings within the past 24 hours.

## 2023-11-09 NOTE — CARE UPDATE
11/08/23 2059   Patient Assessment/Suction   Level of Consciousness (AVPU) alert   Respiratory Effort Normal;Unlabored   Expansion/Accessory Muscles/Retractions no use of accessory muscles   All Lung Fields Breath Sounds diminished;crackles   Rhythm/Pattern, Respiratory unlabored;pattern regular   PRE-TX-O2   Device (Oxygen Therapy) nasal cannula   $ Is the patient on Low Flow Oxygen? Yes   SpO2 100 %   Pulse Oximetry Type Continuous   $ Pulse Oximetry - Multiple Charge Pulse Oximetry - Multiple   Pulse 101   Resp (!) 22   Aerosol Therapy   $ Aerosol Therapy Charges Aerosol Treatment   Daily Review of Necessity (SVN) completed   Respiratory Treatment Status (SVN) given   Treatment Route (SVN) mask;oxygen   Patient Position (SVN) HOB elevated   Post Treatment Assessment (SVN) increased aeration   Signs of Intolerance (SVN) none   Preset CPAP/BiPAP Settings   Mode Of Delivery BiPAP;Standby   Education   $ Education Bronchodilator;15 min   Respiratory Evaluation   $ Care Plan Tech Time 15 min

## 2023-11-09 NOTE — PT/OT/SLP EVAL
"Physical Therapy Evaluation    Patient Name:  Marisol Kerr   MRN:  [id number]    Recommendations:     Discharge Recommendations: Moderate Intensity Therapy   Discharge Equipment Recommendations: none   Barriers to discharge: Decreased caregiver support    Assessment:     Marisol Kerr is a 78 y.o. female admitted with a medical diagnosis of Acute respiratory failure with hypoxia and hypercarbia.  She presents with the following impairments/functional limitations: weakness, impaired endurance, impaired self care skills, impaired functional mobility, gait instability, impaired balance, decreased safety awareness, pain, impaired cardiopulmonary response to activity.    Pt found supine with Dr. Correa present and pt agreeable to working with PT. Pt A & O x  4 and has the following co-morbidities: HTN, DM, Hearing Imp, CHF, COPD, A-Fib, NSTEMI.  Pt tolerated session fairly and required moderate A for safe mobilization during session today. Pt would benefit from acute PT during hospitalization to increase strength, endurance and safety with mobility. The patient currently demonstrates a need for Moderate Intensity Therapy on a daily basis secondary to a decline in safe functional status due to progression/exacerbation of respiratory disease. Pt would benefit from increased caregiver support at home due to increased burden of care(non-healing/recurrent wound).      Rehab Prognosis: Fair; patient would benefit from acute skilled PT services to address these deficits and reach maximum level of function.    Recent Surgery: * No surgery found *      Plan:     During this hospitalization, patient to be seen 6 x/week to address the identified rehab impairments via gait training, therapeutic activities, therapeutic exercises and progress toward the following goals:    Plan of Care Expires:  12/07/23    Subjective     Chief Complaint: wound not healing "from 2 years ago"  Patient/Family Comments/goals: pt feels she is capable of " continuing to live alone.  Pain/Comfort:  Pain Rating 1:  (not rated)  Location 1: sacral spine (wound)  Pain Addressed 1: Reposition, Distraction, Cessation of Activity, Nurse notified    Patients cultural, spiritual, Alevism conflicts given the current situation:      Living Environment:  Pt lives alone in a [facility] with KEMAR.  Prior to admission, patients level of function was MI with use of RW, SC and Transport w/c at household level.  Equipment used at home: walker, rolling, wheelchair, shower chair, cane, straight, rollator.  DME owned (not currently used): none.  Upon discharge, patient will have assistance from facility staff.    Objective:     Communicated with OFELIA Hendrix prior to and after session.  Patient found supine with bed alarm, blood pressure cuff, daniel catheter, oxygen, peripheral IV, pulse ox (continuous), telemetry  upon PT entry to room.    General Precautions: Standard, aspiration, diabetic, fall, contact  Orthopedic Precautions:N/A   Braces: N/A  Respiratory Status: Nasal cannula, flow 3 L/min    Exams:  Cognitive Exam:  Patient is oriented to Person, Place, Time, and Situation  RLE ROM: WFL  RLE Strength: grossly 4/5  LLE ROM: WFL  LLE Strength: grossly 4/5    Functional Mobility:  Bed Mobility:     Rolling Left:  minimum assistance  Rolling Right: minimum assistance  Scooting: moderate assistance  Supine to Sit: moderate assistance  Transfers:     Sit to Stand:  moderate assistance with hand-held assist and rolling walker  Bed to Chair: moderate assistance with  hand-held assist and rolling walker  using  Step Transfer      AM-PAC 6 CLICK MOBILITY  Total Score:12       Treatment & Education:  Pt was educated on the following: call light use, importance of OOB activity and functional mobility to negate the negative effects of prolonged bed rest during this hospitalization, safe transfers/ambulation and discharge planning recommendations/options.      Patient left up in chair with all lines  intact, call button in reach, chair alarm on, and RN present.    GOALS:   Multidisciplinary Problems       Physical Therapy Goals          Problem: Physical Therapy    Goal Priority Disciplines Outcome Goal Variances Interventions   Physical Therapy Goal     PT, PT/OT      Description: Goals to be met by: 23     Patient will increase functional independence with mobility by performin. Supine to sit with Supervision  2. Sit to stand transfer with Supervision  3. Bed to chair transfer with Supervision using Rolling Walker  4. Gait  x 150 feet with Supervision using Rolling Walker.                              History:     Past Medical History:   Diagnosis Date    CAD (coronary artery disease)     Cancer     colon    CHF (congestive heart failure)     Colitis     Colon cancer     COPD (chronic obstructive pulmonary disease)     Decreased hearing     Diabetes mellitus     Diabetes mellitus, type 2     Hypercholesterolemia     Hypertension     Hypoxemia     Insomnia     Obesity     Osteoarthritis        Past Surgical History:   Procedure Laterality Date    ANGIOGRAM, CORONARY, WITH LEFT HEART CATHETERIZATION N/A 2/10/2022    Procedure: Angiogram, Coronary, with Left Heart Cath;  Surgeon: Cristian Benjamin MD;  Location: [facility] CATH/EP LAB;  Service: Cardiology;  Laterality: N/A;    CARDIAC CATHETERIZATION      CHOLECYSTECTOMY      COLECTOMY      CORONARY ANGIOGRAPHY N/A 2023    Procedure: ANGIOGRAM, CORONARY ARTERY;  Surgeon: Nba Riggs MD;  Location: [facility] CATH LAB;  Service: Cardiology;  Laterality: N/A;    CORONARY ANGIOPLASTY      CORONARY STENT PLACEMENT      ERCP N/A 2023    Procedure: ERCP (ENDOSCOPIC RETROGRADE CHOLANGIOPANCREATOGRAPHY);  Surgeon: Biju Kramer III, MD;  Location: [facility];  Service: Endoscopy;  Laterality: N/A;    ESOPHAGOGASTRODUODENOSCOPY N/A 2023    Procedure: EGD (ESOPHAGOGASTRODUODENOSCOPY);  Surgeon: Aarti Alvarez MD;  Location: [facility];   Service: Endoscopy;  Laterality: N/A;    EXTERNAL EAR SURGERY      EYE SURGERY      FLEXIBLE SIGMOIDOSCOPY N/A 9/19/2019    Procedure: SIGMOIDOSCOPY, FLEXIBLE;  Surgeon: Biju Kramer III, MD;  Location: [facility] [place] [place];  Service: Endoscopy;  Laterality: N/A;    HYSTERECTOMY      partial    INSTANTANEOUS WAVE-FREE RATIO (IFR) N/A 8/29/2023    Procedure: Instantaneous Wave-Free Ratio (IFR);  Surgeon: Nba Riggs MD;  Location: [facility] CATH LAB;  Service: Cardiology;  Laterality: N/A;    STENT, DRUG ELUTING, SINGLE VESSEL, CORONARY N/A 8/29/2023    Procedure: Stent, Drug Eluting, Single Vessel, Coronary;  Surgeon: Nba Riggs MD;  Location: [facility] CATH LAB;  Service: Cardiology;  Laterality: N/A;       Time Tracking:     PT Received On: 11/09/23  PT Start Time: 0853     PT Stop Time: 0914  PT Total Time (min): 21 min     Billable Minutes: Evaluation 10 and Therapeutic Activity 11      11/09/2023

## 2023-11-09 NOTE — PLAN OF CARE
No acute events overnight. Patient is very pleasant and in good spirits; tolerating diet and taking meds crushed in pudding. See flowsheets.

## 2023-11-10 ENCOUNTER — ANESTHESIA EVENT (OUTPATIENT)
Dept: SURGERY | Facility: HOSPITAL | Age: 78
DRG: 166 | End: 2023-11-10
Payer: MEDICARE

## 2023-11-10 ENCOUNTER — ANESTHESIA (OUTPATIENT)
Dept: SURGERY | Facility: HOSPITAL | Age: 78
DRG: 166 | End: 2023-11-10
Payer: MEDICARE

## 2023-11-10 LAB
ALBUMIN SERPL BCP-MCNC: 3 G/DL (ref 3.5–5.2)
ALP SERPL-CCNC: 61 U/L (ref 55–135)
ALT SERPL W/O P-5'-P-CCNC: 11 U/L (ref 10–44)
ANION GAP SERPL CALC-SCNC: 6 MMOL/L (ref 8–16)
AST SERPL-CCNC: 16 U/L (ref 10–40)
BASOPHILS # BLD AUTO: 0.01 K/UL (ref 0–0.2)
BASOPHILS NFR BLD: 0.1 % (ref 0–1.9)
BILIRUB SERPL-MCNC: 0.4 MG/DL (ref 0.1–1)
BUN SERPL-MCNC: 28 MG/DL (ref 8–23)
CALCIUM SERPL-MCNC: 8.3 MG/DL (ref 8.7–10.5)
CHLORIDE SERPL-SCNC: 102 MMOL/L (ref 95–110)
CO2 SERPL-SCNC: 37 MMOL/L (ref 23–29)
CREAT SERPL-MCNC: 1.4 MG/DL (ref 0.5–1.4)
DIFFERENTIAL METHOD: ABNORMAL
EOSINOPHIL # BLD AUTO: 0.1 K/UL (ref 0–0.5)
EOSINOPHIL NFR BLD: 0.9 % (ref 0–8)
ERYTHROCYTE [DISTWIDTH] IN BLOOD BY AUTOMATED COUNT: 15.7 % (ref 11.5–14.5)
EST. GFR  (NO RACE VARIABLE): 39 ML/MIN/1.73 M^2
GLUCOSE SERPL-MCNC: 103 MG/DL (ref 70–110)
GLUCOSE SERPL-MCNC: 131 MG/DL (ref 70–110)
GLUCOSE SERPL-MCNC: 84 MG/DL (ref 70–110)
GLUCOSE SERPL-MCNC: 87 MG/DL (ref 70–110)
HCT VFR BLD AUTO: 27.9 % (ref 37–48.5)
HGB BLD-MCNC: 8.2 G/DL (ref 12–16)
IMM GRANULOCYTES # BLD AUTO: 0.06 K/UL (ref 0–0.04)
IMM GRANULOCYTES NFR BLD AUTO: 0.6 % (ref 0–0.5)
LYMPHOCYTES # BLD AUTO: 1.6 K/UL (ref 1–4.8)
LYMPHOCYTES NFR BLD: 16.3 % (ref 18–48)
MAGNESIUM SERPL-MCNC: 2.2 MG/DL (ref 1.6–2.6)
MCH RBC QN AUTO: 26.8 PG (ref 27–31)
MCHC RBC AUTO-ENTMCNC: 29.4 G/DL (ref 32–36)
MCV RBC AUTO: 91 FL (ref 82–98)
MONOCYTES # BLD AUTO: 0.9 K/UL (ref 0.3–1)
MONOCYTES NFR BLD: 8.8 % (ref 4–15)
NEUTROPHILS # BLD AUTO: 7 K/UL (ref 1.8–7.7)
NEUTROPHILS NFR BLD: 73.3 % (ref 38–73)
NRBC BLD-RTO: 0 /100 WBC
PHOSPHATE SERPL-MCNC: 2.5 MG/DL (ref 2.7–4.5)
PLATELET # BLD AUTO: 205 K/UL (ref 150–450)
PMV BLD AUTO: 11.9 FL (ref 9.2–12.9)
POTASSIUM SERPL-SCNC: 3.4 MMOL/L (ref 3.5–5.1)
PROT SERPL-MCNC: 5.4 G/DL (ref 6–8.4)
RBC # BLD AUTO: 3.06 M/UL (ref 4–5.4)
SODIUM SERPL-SCNC: 145 MMOL/L (ref 136–145)
WBC # BLD AUTO: 9.62 K/UL (ref 3.9–12.7)

## 2023-11-10 PROCEDURE — 85025 COMPLETE CBC W/AUTO DIFF WBC: CPT | Performed by: FAMILY MEDICINE

## 2023-11-10 PROCEDURE — 25000242 PHARM REV CODE 250 ALT 637 W/ HCPCS: Performed by: INTERNAL MEDICINE

## 2023-11-10 PROCEDURE — 87186 SC STD MICRODIL/AGAR DIL: CPT | Mod: 59 | Performed by: SURGERY

## 2023-11-10 PROCEDURE — 25000003 PHARM REV CODE 250: Performed by: SURGERY

## 2023-11-10 PROCEDURE — 97530 THERAPEUTIC ACTIVITIES: CPT

## 2023-11-10 PROCEDURE — 71000033 HC RECOVERY, INTIAL HOUR: Performed by: SURGERY

## 2023-11-10 PROCEDURE — 11042 PR DEBRIDEMENT, SKIN, SUB-Q TISSUE,=<20 SQ CM: ICD-10-PCS | Mod: ,,, | Performed by: SURGERY

## 2023-11-10 PROCEDURE — 99233 SBSQ HOSP IP/OBS HIGH 50: CPT | Mod: ,,, | Performed by: INTERNAL MEDICINE

## 2023-11-10 PROCEDURE — 63600175 PHARM REV CODE 636 W HCPCS: Performed by: FAMILY MEDICINE

## 2023-11-10 PROCEDURE — 87070 CULTURE OTHR SPECIMN AEROBIC: CPT | Performed by: SURGERY

## 2023-11-10 PROCEDURE — 84100 ASSAY OF PHOSPHORUS: CPT | Performed by: FAMILY MEDICINE

## 2023-11-10 PROCEDURE — 37000009 HC ANESTHESIA EA ADD 15 MINS: Performed by: SURGERY

## 2023-11-10 PROCEDURE — 87147 CULTURE TYPE IMMUNOLOGIC: CPT | Performed by: SURGERY

## 2023-11-10 PROCEDURE — 63600175 PHARM REV CODE 636 W HCPCS: Performed by: INTERNAL MEDICINE

## 2023-11-10 PROCEDURE — C9113 INJ PANTOPRAZOLE SODIUM, VIA: HCPCS | Performed by: FAMILY MEDICINE

## 2023-11-10 PROCEDURE — 63600175 PHARM REV CODE 636 W HCPCS: Performed by: NURSE ANESTHETIST, CERTIFIED REGISTERED

## 2023-11-10 PROCEDURE — 12000002 HC ACUTE/MED SURGE SEMI-PRIVATE ROOM

## 2023-11-10 PROCEDURE — 25000003 PHARM REV CODE 250: Performed by: FAMILY MEDICINE

## 2023-11-10 PROCEDURE — 99900031 HC PATIENT EDUCATION (STAT)

## 2023-11-10 PROCEDURE — 87077 CULTURE AEROBIC IDENTIFY: CPT | Performed by: SURGERY

## 2023-11-10 PROCEDURE — 94640 AIRWAY INHALATION TREATMENT: CPT

## 2023-11-10 PROCEDURE — 36000706: Performed by: SURGERY

## 2023-11-10 PROCEDURE — 99233 PR SUBSEQUENT HOSPITAL CARE,LEVL III: ICD-10-PCS | Mod: ,,, | Performed by: INTERNAL MEDICINE

## 2023-11-10 PROCEDURE — 82962 GLUCOSE BLOOD TEST: CPT | Performed by: SURGERY

## 2023-11-10 PROCEDURE — 94761 N-INVAS EAR/PLS OXIMETRY MLT: CPT

## 2023-11-10 PROCEDURE — 94660 CPAP INITIATION&MGMT: CPT

## 2023-11-10 PROCEDURE — 63600175 PHARM REV CODE 636 W HCPCS: Performed by: SURGERY

## 2023-11-10 PROCEDURE — 97535 SELF CARE MNGMENT TRAINING: CPT

## 2023-11-10 PROCEDURE — 94799 UNLISTED PULMONARY SVC/PX: CPT

## 2023-11-10 PROCEDURE — 80053 COMPREHEN METABOLIC PANEL: CPT | Performed by: FAMILY MEDICINE

## 2023-11-10 PROCEDURE — 25000003 PHARM REV CODE 250: Performed by: NURSE PRACTITIONER

## 2023-11-10 PROCEDURE — 37000008 HC ANESTHESIA 1ST 15 MINUTES: Performed by: SURGERY

## 2023-11-10 PROCEDURE — D9220A PRA ANESTHESIA: ICD-10-PCS | Mod: CRNA,,, | Performed by: NURSE ANESTHETIST, CERTIFIED REGISTERED

## 2023-11-10 PROCEDURE — 11042 DBRDMT SUBQ TIS 1ST 20SQCM/<: CPT | Mod: ,,, | Performed by: SURGERY

## 2023-11-10 PROCEDURE — 86580 TB INTRADERMAL TEST: CPT | Performed by: STUDENT IN AN ORGANIZED HEALTH CARE EDUCATION/TRAINING PROGRAM

## 2023-11-10 PROCEDURE — 27000221 HC OXYGEN, UP TO 24 HOURS

## 2023-11-10 PROCEDURE — 30200315 PPD INTRADERMAL TEST REV CODE 302: Performed by: STUDENT IN AN ORGANIZED HEALTH CARE EDUCATION/TRAINING PROGRAM

## 2023-11-10 PROCEDURE — 36415 COLL VENOUS BLD VENIPUNCTURE: CPT | Performed by: FAMILY MEDICINE

## 2023-11-10 PROCEDURE — 71000039 HC RECOVERY, EACH ADD'L HOUR: Performed by: SURGERY

## 2023-11-10 PROCEDURE — 99900035 HC TECH TIME PER 15 MIN (STAT)

## 2023-11-10 PROCEDURE — D9220A PRA ANESTHESIA: ICD-10-PCS | Mod: ANES,,, | Performed by: ANESTHESIOLOGY

## 2023-11-10 PROCEDURE — 83735 ASSAY OF MAGNESIUM: CPT | Performed by: FAMILY MEDICINE

## 2023-11-10 PROCEDURE — D9220A PRA ANESTHESIA: Mod: CRNA,,, | Performed by: NURSE ANESTHETIST, CERTIFIED REGISTERED

## 2023-11-10 PROCEDURE — D9220A PRA ANESTHESIA: Mod: ANES,,, | Performed by: ANESTHESIOLOGY

## 2023-11-10 PROCEDURE — 36000707: Performed by: SURGERY

## 2023-11-10 PROCEDURE — 87075 CULTR BACTERIA EXCEPT BLOOD: CPT | Performed by: SURGERY

## 2023-11-10 PROCEDURE — 25000242 PHARM REV CODE 250 ALT 637 W/ HCPCS: Performed by: SURGERY

## 2023-11-10 RX ORDER — ONDANSETRON 2 MG/ML
INJECTION INTRAMUSCULAR; INTRAVENOUS
Status: DISCONTINUED | OUTPATIENT
Start: 2023-11-10 | End: 2023-11-10

## 2023-11-10 RX ORDER — PHENYLEPHRINE HYDROCHLORIDE 10 MG/ML
INJECTION INTRAVENOUS
Status: DISCONTINUED | OUTPATIENT
Start: 2023-11-10 | End: 2023-11-10

## 2023-11-10 RX ORDER — FENTANYL CITRATE 50 UG/ML
25 INJECTION, SOLUTION INTRAMUSCULAR; INTRAVENOUS EVERY 5 MIN PRN
Status: DISCONTINUED | OUTPATIENT
Start: 2023-11-10 | End: 2023-11-10

## 2023-11-10 RX ORDER — ONDANSETRON 2 MG/ML
4 INJECTION INTRAMUSCULAR; INTRAVENOUS DAILY PRN
Status: DISCONTINUED | OUTPATIENT
Start: 2023-11-10 | End: 2023-11-10

## 2023-11-10 RX ORDER — PROPOFOL 10 MG/ML
VIAL (ML) INTRAVENOUS
Status: DISCONTINUED | OUTPATIENT
Start: 2023-11-10 | End: 2023-11-10

## 2023-11-10 RX ORDER — DIPHENHYDRAMINE HYDROCHLORIDE 50 MG/ML
12.5 INJECTION INTRAMUSCULAR; INTRAVENOUS
Status: DISCONTINUED | OUTPATIENT
Start: 2023-11-10 | End: 2023-11-10

## 2023-11-10 RX ORDER — ENOXAPARIN SODIUM 100 MG/ML
1 INJECTION SUBCUTANEOUS EVERY 12 HOURS
Status: DISCONTINUED | OUTPATIENT
Start: 2023-11-10 | End: 2023-11-12

## 2023-11-10 RX ADMIN — TUBERCULIN PURIFIED PROTEIN DERIVATIVE 5 UNITS: 5 INJECTION, SOLUTION INTRADERMAL at 04:11

## 2023-11-10 RX ADMIN — ALPRAZOLAM 0.25 MG: 0.25 TABLET ORAL at 11:11

## 2023-11-10 RX ADMIN — ALLOPURINOL 50 MG: 300 TABLET ORAL at 09:11

## 2023-11-10 RX ADMIN — ENOXAPARIN SODIUM 70 MG: 80 INJECTION SUBCUTANEOUS at 09:11

## 2023-11-10 RX ADMIN — IPRATROPIUM BROMIDE AND ALBUTEROL SULFATE 3 ML: 2.5; .5 SOLUTION RESPIRATORY (INHALATION) at 12:11

## 2023-11-10 RX ADMIN — PROPOFOL 50 MG: 10 INJECTION, EMULSION INTRAVENOUS at 02:11

## 2023-11-10 RX ADMIN — PHENYLEPHRINE HYDROCHLORIDE 100 MCG: 10 INJECTION INTRAVENOUS at 02:11

## 2023-11-10 RX ADMIN — IPRATROPIUM BROMIDE AND ALBUTEROL SULFATE 3 ML: 2.5; .5 SOLUTION RESPIRATORY (INHALATION) at 07:11

## 2023-11-10 RX ADMIN — INSULIN ASPART 1 UNITS: 100 INJECTION, SOLUTION INTRAVENOUS; SUBCUTANEOUS at 08:11

## 2023-11-10 RX ADMIN — SODIUM PHOSPHATE, MONOBASIC, MONOHYDRATE AND SODIUM PHOSPHATE, DIBASIC, ANHYDROUS 15 MMOL: 142; 276 INJECTION, SOLUTION INTRAVENOUS at 11:11

## 2023-11-10 RX ADMIN — PROPOFOL 20 MG: 10 INJECTION, EMULSION INTRAVENOUS at 02:11

## 2023-11-10 RX ADMIN — POTASSIUM CHLORIDE 30 MEQ: 10 INJECTION, SOLUTION INTRAVENOUS at 10:11

## 2023-11-10 RX ADMIN — AMIODARONE HYDROCHLORIDE 200 MG: 200 TABLET ORAL at 09:11

## 2023-11-10 RX ADMIN — SODIUM CHLORIDE, SODIUM LACTATE, POTASSIUM CHLORIDE, AND CALCIUM CHLORIDE: .6; .31; .03; .02 INJECTION, SOLUTION INTRAVENOUS at 02:11

## 2023-11-10 RX ADMIN — Medication 6 MG: at 08:11

## 2023-11-10 RX ADMIN — AMIODARONE HYDROCHLORIDE 200 MG: 200 TABLET ORAL at 08:11

## 2023-11-10 RX ADMIN — ATORVASTATIN CALCIUM 40 MG: 40 TABLET, FILM COATED ORAL at 08:11

## 2023-11-10 RX ADMIN — ONDANSETRON 4 MG: 2 INJECTION INTRAMUSCULAR; INTRAVENOUS at 02:11

## 2023-11-10 RX ADMIN — PANTOPRAZOLE SODIUM 40 MG: 40 INJECTION, POWDER, FOR SOLUTION INTRAVENOUS at 09:11

## 2023-11-10 NOTE — OP NOTE
DATE OF PROCEDURE: 11/10/2023    PREOPERATIVE DIAGNOSIS: Unstageable sacral decubitus wound    POSTOPERATIVE DIAGNOSIS: Stage III sacral decubitus wound    PROCEDURE: Debridement of skin, subcutaneous fat -5.5 x 3 x 1 cm    SURGEON: Jean Linda M.D    ASSISTANT: None    ANESTHESIA: Local MAC    ESTIMATED BLOOD LOSS: Minimal    SPECIMEN: Sacral wound for culture    CONDITION: Stable    COMPLICATIONS: None    FINDINGS:   Overlying eschar debrided, underlying bone is prominent but does still have a layer of tissue overlying    INDICATIONS: Unstageable sacral wound    PROCEDURE IN DETAIL: Consent obtained. Patient taken operating room on a stretcher and placed in the lateral decubitus position.  Monitored anesthesia care was administered. Her sacrum was prepped and draped typical sterile fashion.  Time-out performed by members of the operative team.  Local anesthetic was injected circumferentially.  Using a scalpel and scissors I sharply debrided the overlying eschar she skin and subcutaneous fat down to the level of the underlying wound bed. This site measured 5.5 x 3 x 1 cm. The underlying bony prominences were covered by a thin layer of residual tissue and no obvious bone was exposed.  The bone did appear hard and not soft. There is no undrained fluid collection present.  Cultures were taken regardless. A gauze bandage was placed and then a padded bandage was positioned over this.    DISPO: PACU then floor

## 2023-11-10 NOTE — CARE UPDATE
11/10/23 0750   Patient Assessment/Suction   Level of Consciousness (AVPU) alert   Respiratory Effort Normal;Unlabored   Expansion/Accessory Muscles/Retractions expansion symmetric;no retractions   All Lung Fields Breath Sounds Anterior:;Lateral:;clear;diminished   Rhythm/Pattern, Respiratory depth regular;pattern regular;unlabored   Cough Frequency infrequent   Skin Integrity   $ Wound Care Tech Time 15 min   Area Observed Bridge of nose   Skin Appearance without discoloration   Barrier used? Liquid Filled Cushion   PRE-TX-O2   Device (Oxygen Therapy) nasal cannula   $ Is the patient on Low Flow Oxygen? Yes   Flow (L/min) 3   Oxygen Analyzed Concentration (%) 99 %   Pulse Oximetry Type Continuous   $ Pulse Oximetry - Multiple Charge Pulse Oximetry - Multiple   Pulse 76   Resp (!) 21   Temp 97.3 °F (36.3 °C)   Aerosol Therapy   $ Aerosol Therapy Charges Aerosol Treatment   Daily Review of Necessity (SVN) completed   Respiratory Treatment Status (SVN) given   Treatment Route (SVN) mask   Patient Position (SVN) HOB elevated   Post Treatment Assessment (SVN) increased aeration   Signs of Intolerance (SVN) none   Breath Sounds Post-Respiratory Treatment   Throughout All Fields Post-Treatment All Fields   Throughout All Fields Post-Treatment aeration increased   Post-treatment Heart Rate (beats/min) 68   Post-treatment Resp Rate (breaths/min) 18   Preset CPAP/BiPAP Settings   Mode Of Delivery BiPAP S/T;Standby   Education   $ Education Bronchodilator;BiPAP;15 min

## 2023-11-10 NOTE — PROGRESS NOTES
Progress Note  PULMONARY    Admit Date: 11/4/2023   11/10/2023  History of Present Illness:  Pt is a 79 yo female with end stage COPD, chronic resp failure on 8L home O2 who is on hospice. She called EMS and was in distress then became unresponsive. She was found to be hypercapneic intubated in ED before her hospice status was known. Her daughter is at bedside and wishes to continue vent support for now hoping she may improve.  Pt has been at home on hospice but daughter states she wants her to go to NH as she hasn't been doing well at home.    SUBJECTIVE:     11/5- continues on vent, with SAT/SBT pt wakes and follows commands but tachypneic, tachycardic and hypertensive so failed SBT. Daughter at bedside actively participating. Pt hard of hearing and has her hearing aid R ear    11/6 patient lasted 15 minutes on her sedation vacation and spontaneous breathing trial this morning.  I do not have an ABG.  That is being corrected.  The patient was on hospice.  I have spoken with her daughter who states EMS was called 1st, not hospice.  She was intubated before hospice was ever notified.  The daughter would like to give her 4-7 days on the ventilator and see if she can improve.  I told her that I am not hopeful of this.  The patient was on 8 L of oxygen at home.    11/7 the patient failed her sedation vacation.  The nurse reports 5 minutes, the Respiratory therapist reports 20.  We will repeat it.      11/8 the patient was successfully extubated yesterday.  She wore BiPAP overnight at 12/6.  Her ABG this morning was good.  Tried to talk to her about her code/intubation situation but could not get understanding from the patient.  Will try to revisit this when her daughter gets here.    11/9 the patient does not feel well this morning she feels yucky.  Her breathing is okay.  Physical therapy is here to evaluate her.    11/10 the patient has breakdown on her chin from her BiPAP mask but like sleeping on the BiPAP.  It  makes her feel better.  It is appropriate as she has hypercapnic hypoxemic respiratory failure.  She is going to have her sacral decubitus debrided today.  It is okay with me if she goes to [facility] after this.    OBJECTIVE:     Vitals (Most recent):  Vitals:    11/10/23 0900   BP: (!) 144/65   Pulse: 75   Resp: 19   Temp:        Vitals (24 hour range):  Temp:  [97.3 °F (36.3 °C)-98.1 °F (36.7 °C)]   Pulse:  []   Resp:  [13-56]   BP: (139-161)/(62-70)   SpO2:  [96 %-100 %]       Intake/Output Summary (Last 24 hours) at 11/10/2023 1024  Last data filed at 11/10/2023 0937  Gross per 24 hour   Intake 540 ml   Output 2025 ml   Net -1485 ml          PHYSICAL EXAM  GENERAL: Older patient in no distress, on nasal cannula.  HEENT: Pupils equal and reactive. Extraocular movements intact. Nose   Pharynx moist  NECK: Supple.   HEART: Regular rate and rhythm. No murmur or gallop auscultated.  LUNGS:  There are clear to auscultation..  Lung excursion symmetrical. No change in fremitus.   ABDOMEN: Bowel sounds present. Non-tender, no masses palpated.  : Normal anatomy.  Montes with yellow urine  EXTREMITIES: Normal muscle tone and joint movement, no cyanosis or clubbing.  The heels are looking better.  There is an eschar over the 3rd toe on the right foot.  Right midline.  LYMPHATICS: No adenopathy palpated, tr edema.  SKIN: Dry multiple abrasions and eschars, decubitus over her coccyx has progressed terribly to a big thick eschar with the appearance of a Stephane lesion.  NEURO:  Awake but very tired this morning.  PSYCH:  Quiet      Radiographs reviewed: view by direct vision   Chest x-ray 11/08/2023  There are faint linear opacities at the lung bases suggestive of atelectasis/scarring, aspiration/pneumonia or trace edema in the appropriate clinical setting. There is blunting of both costophrenic angles consistent with trace pleural effusions and adjacent atelectasis. No pneumothorax is identified. The heart is top normal in  size. Osseous structures appear unchanged. The visualized upper abdomen is unremarkable       TTE 8/26/23-     Left Ventricle: The left ventricle is normal in size. Moderately increased ventricular mass. Moderately increased wall thickness. There is moderate concentrict hypertrophy. Normal wall motion. There is normal systolic function.  EF 61% There is normal diastolic function.    Left Atrium: Left atrium is moderately dilated.    Right Ventricle: Normal right ventricular cavity size. Wall thickness is normal. Right ventricle wall motion  is normal. Systolic function is normal.    Mitral Valve: Mildly thickened anterior leaflet. Mild mitral annular calcification.    IVC/SVC: Normal venous pressure at 3 mmHg.    Pericardium: There is an effusion.    Limited echo; no gross pulmonary hypertension but poor visualization of tricuspid signal    No tissue Doppler performed to assess mean left atrial pressure    Labs     Recent Labs   Lab 11/10/23  0238   WBC 9.62   HGB 8.2*   HCT 27.9*        Recent Labs   Lab 11/10/23  0238      K 3.4*      CO2 37*   BUN 28*   CREATININE 1.4      CALCIUM 8.3*   MG 2.2   PHOS 2.5*   AST 16   ALT 11   ALKPHOS 61   BILITOT 0.4   PROT 5.4*   ALBUMIN 3.0*     Microbiology Results (last 7 days)       Procedure Component Value Units Date/Time    Blood Culture #2 **CANNOT BE ORDERED STAT** [[id number]] Collected: 11/04/23 1552    Order Status: Completed Specimen: Blood Updated: 11/09/23 1632     Blood Culture, Routine No growth after 5 days.    Narrative:      Collection has been rescheduled by [facility] at 11/04/2023 12:28 Reason:   Unable to collect 2nd set  Collection has been rescheduled by [facility] at 11/04/2023 12:28 Reason:   Unable to collect 2nd set    Blood Culture #1 **CANNOT BE ORDERED STAT** [[id number]] Collected: 11/04/23 1204    Order Status: Completed Specimen: Blood Updated: 11/09/23 1432     Blood Culture, Routine No growth after 5 days.    Urine culture  [[id number]] Collected: 11/04/23 1134    Order Status: Completed Specimen: Urine from Clean Catch Updated: 11/06/23 1646     Urine Culture, Routine No growth    Culture, Respiratory with Gram Stain [[id number]]  (Abnormal)  (Susceptibility) Collected: 11/04/23 1122    Order Status: Completed Specimen: Respiratory from Sputum Updated: 11/06/23 0706     Respiratory Culture KLEBSIELLA PNEUMONIAE  Few       Gram Stain (Respiratory) <10 epithelial cells per low power field.     Gram Stain (Respiratory) Few WBC's     Gram Stain (Respiratory) Few Gram negative rods    MRSA Screen by PCR [[id number]]  (Abnormal) Collected: 11/04/23 1609    Order Status: Completed Specimen: Nasopharyngeal Swab from Nasal Updated: 11/04/23 1736     MRSA SCREEN BY PCR Positive     Comment: Positive MRSA by PCR called and verbal readback obtained from Ines Alonzo, ICU, RN. by [facility] 11/04/2023 17:36         Narrative:      Positive MRSA by PCR called and verbal readback obtained from Ines Alonzo ICU, RN. by [facility] 11/04/2023 17:36    Urine Culture High Risk [[id number]]     Order Status: Completed Specimen: Urine             Impression:  Active Hospital Problems    Diagnosis  POA    *Acute respiratory failure with hypoxia and hypercarbia [J96.01, J96.02]  Yes    Hypernatremia [E87.0]  Unknown    Pressure injury, unstageable [L89.95]  Unknown    Atrial fibrillation [I48.91]  No    Pneumonia  [J18.9]  Yes    COPD exacerbation [J44.1]  Yes    Acute on chronic heart failure with preserved ejection fraction (HFpEF) [I50.33]  Yes    Hyperkalemia [E87.5]  Yes    Chronic kidney disease, stage 4 (severe) [N18.4]  Yes    DM type 2, controlled, with complication [E11.8]  Yes    Essential hypertension, benign [I10]  Yes      Resolved Hospital Problems    Diagnosis Date Resolved POA    Leukocytosis [D72.829] 11/09/2023 Yes           Assessment/Plan:     Acute on chronic hypercapneic/hypoxemic respiratory failure with mechanical ventilation  - on 8 L of  oxygen at home  - extubated and on 2 L nasal cannula  - slept on BiPAP, will continue this nightly  Acute on chronic diastolic CHF  Bilateral pleural effusions  - on Lasix 20mg po, creatinine stable  - continuing to diurese  - will check chest x-ray  COPD with acute exacerbation  - resolved  - no wheezing auscultated  - continue DuoNebs to q.6  - wean prednisone  Right lower lobe pneumonia  - Klebsiella pneumoniae  - treated  Chest pain   - known coronary artery disease and recent stents  - she is anticoagulated and receiving Plavix and aspirin  Acute on chronic renal failure  - creatinine stable  Atrial fibrillation  - on amiodarone  Coronary artery disease  - recent stents  Anemia  - chronic disease  Hyperglycemia and diabetes mellitus  Hypernatremia  - continue Lasix 20 p.o.  - begin oral diet  Hypophosphatemia  - replace and trend  Troponin leak  Mild hypoalbuminemia  Decubiti to heels and sacrum  - offload pressure to heels  - sacral decubitus has progressed dramatically to an eschar and Stephane lesion appearance  - plans for debridement noted  Leukocytosis  - resolved  Dysphagia  - modified diet noted    Patient was a hospice patient on admission.  Will need to talk with her daughter we have left her a DNR.  Plans for skilled nursing facility to [facility] care noted  The patient is appropriate to be back in hospice  No objection to patient going to [facility] after her debridement  Continue BiPAP nightly  Tried reaching her daughter again, no answer      Will continue current dose of enoxaparin with the AFib on this admission  Protonix for GI prophylaxis

## 2023-11-10 NOTE — PROGRESS NOTES
"[facility] Medicine  Progress Note    Patient Name: Marisol Kerr  MRN: [id number]  Patient Class: IP- Inpatient   Admission Date: 11/4/2023  Length of Stay: 6 days  Attending Physician: Randolph Merritt MD  Primary Care Provider: Khadar Dwyer MD        Subjective:     Principal Problem:Acute respiratory failure with hypoxia and hypercarbia        HPI:  Marisol Kerr is a 78 y.o. White female   With PMH of HFpEF, COPD,   CAD, DM2, HTN,   who presents with SOB.     Pt currently intubated  Per EMS report, pt was found gasping for air  Said "help me, help me"  then went unresponsive  She was seated, no head trauma  Pt intubated before arrival to ER  No cardiac arrest occured     Pt was DNR, DNI + on hospice  but hospice + DNR/DNI has been revoked by her daughter      Overview/Hospital Course:  Patient admitted for acute hypercapnic respiratory failure secondary to COPD exacerbation.  Chest x-ray showed possible aspiration.  Patient was intubated before arrival to the emergency room.  Admitted to the ICU.  Pulmonology consulted.  Patient started on pressor support, given IV diuretics for concern of fluid overload.  Started on empiric IV antibiotics.  Steroids and inhalers.  Weaned off pressors.  Difficult weaning off of mechanical ventilation.  Sputum cultures were positive for Klebsiella pneumoniae.  Patient developed atrial fibrillation with rapid RVR, amiodarone drip was started.  Drip was discontinued patient was started on p.o. amiodarone.  Patient was successfully extubated on 11/07/2023.  Was placed on BiPAP at night.  Oxygenation improved.  IV steroids converted to p.o..  IV Lasix down titrated.      Interval History: Pt was not reporting of any sob, chest pain and was waiting for her surgery for today, otherwise no n/v, urine or bowel problem.     Review of Systems   Constitutional:  Negative for activity change, appetite change, chills and fever.   HENT:  Negative for congestion, ear " pain and nosebleeds.    Eyes:  Negative for itching and visual disturbance.   Respiratory:  Negative for apnea, cough, chest tightness, shortness of breath and wheezing.    Cardiovascular:  Negative for chest pain, palpitations and leg swelling.   Gastrointestinal:  Negative for abdominal distention, abdominal pain, constipation, diarrhea, nausea and vomiting.   Genitourinary:  Negative for dysuria and flank pain.   Musculoskeletal:  Negative for back pain.   Neurological:  Negative for dizziness and headaches.     Objective:     Vital Signs (Most Recent):  Temp: 97.8 °F (36.6 °C) (11/10/23 1115)  Pulse: 83 (11/10/23 1300)  Resp: (!) 26 (11/10/23 1300)  BP: 136/70 (11/10/23 1300)  SpO2: 98 % (11/10/23 1300) Vital Signs (24h Range):  Temp:  [97.3 °F (36.3 °C)-98.1 °F (36.7 °C)] 97.8 °F (36.6 °C)  Pulse:  [] 83  Resp:  [13-56] 26  SpO2:  [97 %-100 %] 98 %  BP: (117-161)/(51-70) 136/70     Weight: 77.7 kg (171 lb 4.8 oz)  Body mass index is 30.34 kg/m².    Intake/Output Summary (Last 24 hours) at 11/10/2023 1500  Last data filed at 11/10/2023 1438  Gross per 24 hour   Intake 250 ml   Output 2320 ml   Net -2070 ml         Physical Exam  Vitals reviewed.   Constitutional:       General: She is not in acute distress.     Appearance: Normal appearance. She is not ill-appearing, toxic-appearing or diaphoretic.   HENT:      Head: Normocephalic and atraumatic.      Mouth/Throat:      Mouth: Mucous membranes are moist.      Pharynx: Oropharynx is clear.   Eyes:      General: No scleral icterus.     Conjunctiva/sclera: Conjunctivae normal.   Neck:      Vascular: No carotid bruit.   Cardiovascular:      Rate and Rhythm: Normal rate and regular rhythm.      Pulses: Normal pulses.      Heart sounds: Normal heart sounds.   Pulmonary:      Effort: Pulmonary effort is normal.      Breath sounds: Normal breath sounds.   Abdominal:      General: Abdomen is flat. Bowel sounds are normal.      Palpations: Abdomen is soft.    Musculoskeletal:         General: Normal range of motion.   Skin:     General: Skin is warm.   Neurological:      General: No focal deficit present.      Mental Status: She is alert and oriented to person, place, and time. Mental status is at baseline.   Psychiatric:         Mood and Affect: Mood normal.         Behavior: Behavior normal.             Significant Labs: All pertinent labs within the past 24 hours have been reviewed.  BMP:   Recent Labs   Lab 11/10/23  0238         K 3.4*      CO2 37*   BUN 28*   CREATININE 1.4   CALCIUM 8.3*   MG 2.2     CBC:   Recent Labs   Lab 11/09/23  0720 11/10/23  0238   WBC 9.37 9.62   HGB 8.6* 8.2*   HCT 28.9* 27.9*    205     CMP:   Recent Labs   Lab 11/09/23  0720 11/10/23  0238   * 145   K 3.4* 3.4*    102   CO2 39* 37*   GLU 85 103   BUN 33* 28*   CREATININE 1.5* 1.4   CALCIUM 8.4* 8.3*   PROT 5.4* 5.4*   ALBUMIN 3.0* 3.0*   BILITOT 0.4 0.4   ALKPHOS 58 61   AST 17 16   ALT 9* 11   ANIONGAP 5* 6*     Magnesium:   Recent Labs   Lab 11/09/23  0720 11/10/23  0238   MG 2.3 2.2       Significant Imaging: I have reviewed all pertinent imaging results/findings within the past 24 hours.      Assessment/Plan:      * Acute respiratory failure with hypoxia and hypercarbia  Looking better today  C/w breathing rx and diuretics    Can be downgraded to med tele    Pressure injury, unstageable  Pt pending debridement today    Hypernatremia  From dehydration -   Encourage oral intake      Atrial fibrillation  No reported history of atrial fibrillation, EKG showed atrial fibrillation with rapid ventricular response  Cardiology notified, patient started amiodarone drip.  This was discontinued, amiodarone p.o. started.  Patient on full-dose Lovenox.    Pneumonia   Sputum cultures grew Klebsiella pneumoniae   discontinue vancomycin and cefepime, Rocephin started by pulmonology        COPD exacerbation  C/w Abx, breathing rx, steroids and oxygen as  "needed    Acute on chronic heart failure with preserved ejection fraction (HFpEF)  Patient is identified as having Diastolic (HFpEF) heart failure that is Acute on chronic. CHF is currently controlled. Latest ECHO performed and demonstrates- Results for orders placed during the hospital encounter of 08/25/23    Echo Saline Bubble? No    Interpretation Summary    Left Ventricle: The left ventricle is normal in size. Moderately increased ventricular mass. Moderately increased wall thickness. There is moderate concentrict hypertrophy. Normal wall motion. There is normal systolic function.  EF 61% There is normal diastolic function.    Left Atrium: Left atrium is moderately dilated.    Right Ventricle: Normal right ventricular cavity size. Wall thickness is normal. Right ventricle wall motion  is normal. Systolic function is normal.    Mitral Valve: Mildly thickened anterior leaflet. Mild mitral annular calcification.    IVC/SVC: Normal venous pressure at 3 mmHg.    Pericardium: There is an effusion.    Limited echo; no gross pulmonary hypertension but poor visualization of tricuspid signal    No tissue Doppler performed to assess mean left atrial pressure    Lasix decreased to  20 mg p.o. daily     Recent Labs   Lab 11/04/23  1057   BNP 1,995*   .    Hyperkalemia  resolved      Chronic kidney disease, stage 4 (severe)  Creatine stable for now. BMP reviewed- noted Estimated Creatinine Clearance: 29.5 mL/min (A) (based on SCr of 1.6 mg/dL (H)). according to latest data. Based on current GFR, CKD stage is stage 3 - GFR 30-59.  Monitor UOP and serial BMP and adjust therapy as needed. Renally dose meds. Avoid nephrotoxic medications and procedures.    DM type 2, controlled, with complication  Patient's FSGs are controlled on current medication regimen.  Last A1c reviewed-   Lab Results   Component Value Date    HGBA1C 5.0 08/25/2023     Most recent fingerstick glucose reviewed- No results for input(s): "POCTGLUCOSE" " in the last 24 hours.  Current correctional scale  Low  Maintain anti-hyperglycemic dose as follows-   Antihyperglycemics (From admission, onward)    Start     Stop Route Frequency Ordered    11/04/23 1439  insulin aspart U-100 pen 0-10 Units         -- SubQ Every 6 hours PRN 11/04/23 1341        Hold Oral hypoglycemics while patient is in the hospital.    Essential hypertension, benign  Holding outpatient blood pressure medication given hypotension and pressor requirement   We will resume once blood pressure is more stable    VTE Risk Mitigation (From admission, onward)         Ordered     enoxaparin injection 70 mg  Every 12 hours         11/10/23 0718     IP VTE HIGH RISK PATIENT  Once         11/04/23 1341     Place sequential compression device  Until discontinued         11/04/23 1341                Discharge Planning   LUZ: 11/13/2023     Code Status: DNR   Is the patient medically ready for discharge?:     Reason for patient still in hospital (select all that apply): Treatment  Discharge Plan A: [facility] placement [facility]            Critical care time spent on the evaluation and treatment of severe organ dysfunction, review of pertinent labs and imaging studies, discussions with consulting providers and discussions with patient/family: 25 minutes.      Randolph Merritt MD  Department of Hospital Medicine   [facility]

## 2023-11-10 NOTE — TRANSFER OF CARE
"Anesthesia Transfer of Care Note    Patient: Marisol Kerr    Procedure(s) Performed: Procedure(s) (LRB):  DEBRIDEMENT, WOUND, SACRUM (N/A)    Patient location: PACU    Anesthesia Type: general    Transport from OR: Transported from OR on room air with adequate spontaneous ventilation    Post pain: adequate analgesia    Post assessment: no apparent anesthetic complications    Post vital signs: stable    Level of consciousness: awake    Nausea/Vomiting: no nausea/vomiting    Complications: none    Transfer of care protocol was followed      Last vitals:   Visit Vitals  /70   Pulse 83   Temp 36.6 °C (97.8 °F) (Axillary)   Resp (!) 26   Ht 5' 3" (1.6 m)   Wt 77.7 kg (171 lb 4.8 oz)   SpO2 98%   BMI 30.34 kg/m²     "

## 2023-11-10 NOTE — PROGRESS NOTES
Pharmacist Renal Dose Adjustment Note    Marisol Kerr is a 78 y.o. female being treated with the medication lovenox    Patient Data:    Vital Signs (Most Recent):  Temp: 98.1 °F (36.7 °C) (11/09/23 1901)  Pulse: 80 (11/10/23 0032)  Resp: (!) 21 (11/10/23 0032)  BP: (!) 155/70 (11/09/23 1901)  SpO2: 100 % (11/10/23 0301) Vital Signs (72h Range):  Temp:  [97.9 °F (36.6 °C)-98.9 °F (37.2 °C)]   Pulse:  []   Resp:  [9-56]   BP: ()/()   SpO2:  [91 %-100 %]   Arterial Line BP: (-)/(-)      Recent Labs   Lab 11/08/23  0344 11/09/23  0720 11/10/23  0238   CREATININE 1.6* 1.5* 1.4     Serum creatinine: 1.4 mg/dL 11/10/23 0238  Estimated creatinine clearance: 33.7 mL/min     Medication Dose Route Frequency   Previous Order Lovenox 1 mg/kg every 24 hours   New Order Lovenox 1 mg/kg every 12 hours     Renal dose adjustments performed as noted above.  We will continue monitoring and adjusting as necessary.    Pharmacist: Ramona oJya, PharmD  [phone]

## 2023-11-10 NOTE — ANESTHESIA PREPROCEDURE EVALUATION
11/10/2023  Marisol Kerr is a 78 y.o., female.      Patient Active Problem List   Diagnosis    Hypercholesterolemia    Essential hypertension, benign    Depressive disorder, not elsewhere classified    Coronary atherosclerosis    Persistent disorder of initiating or maintaining sleep    Edema    DM type 2, controlled, with complication    Empyema lung    Hypertension    ASCVD (arteriosclerotic cardiovascular disease)    Gastroesophageal reflux disease without esophagitis    Diabetic peripheral neuropathy    Diabetic peripheral vascular disease    Chronic kidney disease, stage 4 (severe)    Osteoporosis, post-menopausal    Class 2 obesity in adult    Fall    Lung nodule    History of colon cancer    Pure hypercholesterolemia    Adrenal adenoma    Primary osteoarthritis of both hips    Chest pain    Decubitus ulcer of sacral region, stage 1    Bilateral nonobstructing nephrolithiasis    Hypophosphatemia    Primary insomnia    Vitamin D deficiency    Acute on chronic respiratory failure    Hyperkalemia    Venous stasis    Hard of hearing    NSTEMI (non-ST elevated myocardial infarction)    Rectus sheath hematoma    Normocytic anemia    Esophageal dysmotility    Gout    Impaired functional mobility and endurance    Acute on chronic heart failure with preserved ejection fraction (HFpEF)    Multiple chronic diseases    Varicose veins of unspecified lower extremity with ulcer of unspecified site (CODE)    Unstable angina    Elevated liver enzymes    COPD exacerbation    Acute respiratory failure with hypoxia and hypercarbia    Pneumonia     Anemia    Shortness of breath    Congestive heart failure    Debility    Sacral decubitus ulcer, stage II    Acute on chronic respiratory failure with hypoxia and hypercapnia    Abnormal EKG    Goals of care,  counseling/discussion    Acute CHF    Heart failure with preserved ejection fraction    PVD (peripheral vascular disease)    Drug reaction    Elevated troponin    Hypertensive emergency    Fever    Atrial fibrillation    Hypernatremia    Pressure injury, unstageable       Past Surgical History:   Procedure Laterality Date    ANGIOGRAM, CORONARY, WITH LEFT HEART CATHETERIZATION N/A 2/10/2022    Procedure: Angiogram, Coronary, with Left Heart Cath;  Surgeon: Cristian Benjamin MD;  Location: [facility] CATH/EP LAB;  Service: Cardiology;  Laterality: N/A;    CARDIAC CATHETERIZATION      CHOLECYSTECTOMY      COLECTOMY      CORONARY ANGIOGRAPHY N/A 8/29/2023    Procedure: ANGIOGRAM, CORONARY ARTERY;  Surgeon: Nba Riggs MD;  Location: [facility] CATH LAB;  Service: Cardiology;  Laterality: N/A;    CORONARY ANGIOPLASTY      CORONARY STENT PLACEMENT      ERCP N/A 1/16/2023    Procedure: ERCP (ENDOSCOPIC RETROGRADE CHOLANGIOPANCREATOGRAPHY);  Surgeon: Biju Kramer III, MD;  Location: [facility];  Service: Endoscopy;  Laterality: N/A;    ESOPHAGOGASTRODUODENOSCOPY N/A 1/29/2023    Procedure: EGD (ESOPHAGOGASTRODUODENOSCOPY);  Surgeon: Aarti Alvarez MD;  Location: [facility];  Service: Endoscopy;  Laterality: N/A;    EXTERNAL EAR SURGERY      EYE SURGERY      FLEXIBLE SIGMOIDOSCOPY N/A 9/19/2019    Procedure: SIGMOIDOSCOPY, FLEXIBLE;  Surgeon: Biju Kramer III, MD;  Location: [facility] ENDO;  Service: Endoscopy;  Laterality: N/A;    HYSTERECTOMY      partial    INSTANTANEOUS WAVE-FREE RATIO (IFR) N/A 8/29/2023    Procedure: Instantaneous Wave-Free Ratio (IFR);  Surgeon: Nba Riggs MD;  Location: [facility] CATH LAB;  Service: Cardiology;  Laterality: N/A;    STENT, DRUG ELUTING, SINGLE VESSEL, CORONARY N/A 8/29/2023    Procedure: Stent, Drug Eluting, Single Vessel, Coronary;  Surgeon: Nba Riggs MD;  Location: [facility] CATH LAB;  Service: Cardiology;  Laterality: N/A;        Tobacco  Use:  The patient  reports that she quit smoking about 17 years ago. Her smoking use included cigarettes. She started smoking about 59 years ago. She has a 150.1 pack-year smoking history. She has never used smokeless tobacco.     Results for orders placed or performed during the hospital encounter of 11/04/23   EKG 12-lead    Collection Time: 11/08/23  8:29 AM    Narrative    Test Reason : R07.9,    Vent. Rate : 103 BPM     Atrial Rate : 103 BPM     P-R Int : 162 ms          QRS Dur : 106 ms      QT Int : 350 ms       P-R-T Axes : 067 060 223 degrees     QTc Int : 458 ms    Sinus tachycardia  Minimal voltage criteria for LVH, may be normal variant ( [place] product )  ST and T wave abnormality, consider inferolateral ischemia  Abnormal ECG  When compared with ECG of 05-NOV-2023 20:43,  Sinus rhythm has replaced Atrial fibrillation  Inverted T waves have replaced nonspecific T wave abnormality in Inferior  leads    Referred By: [email or web address]   SELF           Confirmed By:              Lab Results   Component Value Date    WBC 9.62 11/10/2023    HGB 8.2 (L) 11/10/2023    HCT 27.9 (L) 11/10/2023    MCV 91 11/10/2023     11/10/2023     BMP  Lab Results   Component Value Date     11/10/2023    K 3.4 (L) 11/10/2023     11/10/2023    CO2 37 (H) 11/10/2023    BUN 28 (H) 11/10/2023    CREATININE 1.4 11/10/2023    CALCIUM 8.3 (L) 11/10/2023    ANIONGAP 6 (L) 11/10/2023     11/10/2023    GLU 85 11/09/2023    GLU 65 (L) 11/08/2023       Results for orders placed during the hospital encounter of 04/08/22    Echo    Interpretation Summary  · Limited study for wall morgan. several off axis views.  · The estimated ejection fraction is 60%.  · The left ventricle is normal in size with normal systolic function.  · The following segments are hypokinetic: basal inferoseptal and basal inferior. All other segments are normal.  · Normal right ventricular size with normal right ventricular systolic  function.              Pre-op Assessment    I have reviewed the Patient Summary Reports.     I have reviewed the Nursing Notes. I have reviewed the NPO Status.   I have reviewed the Medications.     Review of Systems  Anesthesia Hx:  No problems with previous Anesthesia  Denies Family Hx of Anesthesia complications.   Denies Personal Hx of Anesthesia complications.   Social:  Former Smoker    Hematology/Oncology:         -- Anemia:   Cardiovascular:   Hypertension, well controlled Past MI (hx NSTEMI 2022) CAD (multivessel disease on cath, pt was to have PCI but rescheduled, not candidate for CABG)  CABG/stent (hx of prior PCI )  Angina (unstable angina, no current CP) CHF (EF preserved 60%) hyperlipidemia    Pulmonary:   COPD, severe Asthma moderate Resp failure requiring intubation this admit, now extubated on BiPAP and O2   Renal/:   Chronic Renal Disease (acute on chronic renal failure. K correction ongoing), CKD, ARF renal calculi    Hepatic/GI:   GERD, well controlled    Musculoskeletal:   Arthritis (Gout, osteoarthritis)  Was using canes/walker for mobility prior to admission   Neurological:   Neuromuscular Disease, (diabetic peripheral neuropathy)    Endocrine:   Diabetes, type 2    Psych:   Psychiatric History depression AMS on admit, pt responding now but intermittent lethargy/confusion         Physical Exam  General: Well nourished and Cooperative    Airway:  Mallampati: III / II  Mouth Opening: Normal  TM Distance: Normal  Tongue: Normal  Neck ROM: Normal ROM    Chest/Lungs:  Clear to auscultation    Heart:  Rate: Normal  Rhythm: Regular Rhythm  Sounds: Normal    Abdomen:  Normal, Soft, Nontender        Anesthesia Plan  Type of Anesthesia, risks & benefits discussed:    Anesthesia Type: Gen Natural Airway  Intra-op Monitoring Plan: Standard ASA Monitors  Post Op Pain Control Plan: IV/PO Opioids PRN  (medical reason for not using multimodal pain management)  Induction:  IV  Airway Plan: Video,  Post-Induction  Informed Consent: Informed consent signed with the Patient and all parties understand the risks and agree with anesthesia plan.  All questions answered. Patient consented to blood products? No  ASA Score: 4 Emergent  Anesthesia Plan Notes:   GNA  Propofol  Zofran    Ready For Surgery From Anesthesia Perspective.     .

## 2023-11-10 NOTE — RESPIRATORY THERAPY
11/09/23 1843   Patient Assessment/Suction   Level of Consciousness (AVPU) alert   Respiratory Effort Normal;Unlabored   Expansion/Accessory Muscles/Retractions no retractions;no use of accessory muscles   All Lung Fields Breath Sounds Anterior:;Posterior:;Lateral:;equal bilaterally   WILMER Breath Sounds clear   LLL Breath Sounds crackles, fine;diminished   RUL Breath Sounds clear   RML Breath Sounds diminished;crackles, fine   RLL Breath Sounds diminished;crackles, fine   Rhythm/Pattern, Respiratory depth regular;pattern regular;unlabored   Skin Integrity   $ Wound Care Tech Time 15 min   Area Observed Right;Behind ear;Left;Cheek;Nares;Upper lip;Forehead;Bridge of nose   Skin Appearance without discoloration   PRE-TX-O2   Device (Oxygen Therapy) nasal cannula   $ Is the patient on Low Flow Oxygen? Yes   Flow (L/min) 2   Oxygen Analyzed Concentration (%) 28 %   SpO2 98 %   Pulse Oximetry Type Continuous   $ Pulse Oximetry - Multiple Charge Pulse Oximetry - Multiple   Pulse 99   Resp 13   Aerosol Therapy   $ Aerosol Therapy Charges Aerosol Treatment   Daily Review of Necessity (SVN) completed   Respiratory Treatment Status (SVN) given   Treatment Route (SVN) oxygen;mask   Patient Position (SVN) HOB elevated   Post Treatment Assessment (SVN) breath sounds improved   Signs of Intolerance (SVN) none   Breath Sounds Post-Respiratory Treatment   Throughout All Fields Post-Treatment All Fields   Throughout All Fields Post-Treatment aeration increased   Post-treatment Heart Rate (beats/min) 95   Post-treatment Resp Rate (breaths/min) 21   Preset CPAP/BiPAP Settings   Mode Of Delivery BiPAP;Standby

## 2023-11-10 NOTE — PROGRESS NOTES
Spoke with her daughter who states her mother is willing to be reintubated, but does not want CPR or defibrillation.  Explained to the daughter that although she is looking better, she is not well.

## 2023-11-10 NOTE — PLAN OF CARE
Received msg that pt's daughter is interested in [facility] for SNF to care home.   A referral was sent. Back up plan- rehab @ [facility].  to follow       11/10/23 1313   Post-Acute Status   Post-Acute Authorization Placement   Post-Acute Placement Status Referrals Sent

## 2023-11-10 NOTE — PT/OT/SLP PROGRESS
Speech Language Pathology      Marisol Kerr  MRN: [id number]    Patient not seen today secondary to Off the floor for procedure/surgery. Will follow-up 11/11.

## 2023-11-10 NOTE — CARE UPDATE
11/10/23 0032   Patient Assessment/Suction   Level of Consciousness (AVPU) alert   Respiratory Effort Normal;Unlabored   Expansion/Accessory Muscles/Retractions no use of accessory muscles   All Lung Fields Breath Sounds diminished;crackles, fine   Rhythm/Pattern, Respiratory unlabored;pattern regular   Cough Frequency infrequent   Cough Type good   PRE-TX-O2   Device (Oxygen Therapy) BIPAP   $ Is the patient on Low Flow Oxygen? Yes   Oxygen Concentration (%) 30   SpO2 100 %   Pulse Oximetry Type Continuous   $ Pulse Oximetry - Multiple Charge Pulse Oximetry - Multiple   Pulse 80   Resp (!) 21   Aerosol Therapy   $ Aerosol Therapy Charges Aerosol Treatment   Daily Review of Necessity (SVN) completed   Respiratory Treatment Status (SVN) given   Treatment Route (SVN) in-line   Patient Position (SVN) HOB elevated   Post Treatment Assessment (SVN) increased aeration   Signs of Intolerance (SVN) none   Ready to Wean/Extubation Screen   FIO2<=50 (chart decimal) 0.3   Preset CPAP/BiPAP Settings   Mode Of Delivery BiPAP   $ Is patient using? Yes   Equipment Type V60   Airway Device Type medium full face mask   Ipap 12   EPAP (cm H2O) 6   Pressure Support (cm H2O) 6   Set Rate (Breaths/Min) 12   ITime (sec) 1   Rise Time (sec) 3   Patient CPAP/BiPAP Settings   RR Total (Breaths/Min) 21   Tidal Volume (mL) 441   VE Minute Ventilation (L/min) 9.6 L/min   Peak Inspiratory Pressure (cm H2O) 12   TiTOT (%) 33   Total Leak (L/Min) 0   Patient Trigger - ST Mode Only (%) 97   CPAP/BiPAP Alarms   High Pressure (cm H2O) 30   Low Pressure (cm H2O) 10   Minute Ventilation (L/Min) 3   High RR (breaths/min) 45   Low RR (breaths/min) 5   Apnea (Sec) 20   Education   $ Education BiPAP;15 min   Respiratory Evaluation   $ Care Plan Tech Time 15 min   $ Eval/Re-eval Charges Re-evaluation

## 2023-11-10 NOTE — ANESTHESIA POSTPROCEDURE EVALUATION
Anesthesia Post Evaluation    Patient: Marisol Kerr    Procedure(s) Performed: Procedure(s) (LRB):  DEBRIDEMENT, WOUND, SACRUM (N/A)    Final Anesthesia Type: general      Patient location during evaluation: PACU  Patient participation: Yes- Able to Participate  Level of consciousness: awake and alert and oriented  Post-procedure vital signs: reviewed and stable  Pain management: adequate  Airway patency: patent    PONV status at discharge: No PONV  Anesthetic complications: no      Cardiovascular status: blood pressure returned to baseline and hemodynamically stable  Respiratory status: unassisted, spontaneous ventilation and nasal cannula  Hydration status: euvolemic  Follow-up not needed.      Release to floor with O2 nasal cannula    Vitals Value Taken Time   /57 11/10/23 1515   Temp 36.6 11/10/23 1527   Pulse 67 11/10/23 1525   Resp 24 11/10/23 1525   SpO2 100 % 11/10/23 1525   Vitals shown include unvalidated device data.      No case tracking events are documented in the log.      Pain/Bandar Score: Bandar Score: 9 (11/10/2023  3:15 PM)

## 2023-11-10 NOTE — NURSING
Off floor to surgery.  Bag 4 of 6 potassium infusing as well as sodium phos.  Remaining 2 10meq bags of potassium sent with patient.    1415:  report called to charge nurse Karo and informed surgery of new room assignment

## 2023-11-10 NOTE — PT/OT/SLP PROGRESS
Physical Therapy      Patient Name:  Marisol Kerr   MRN:  [id number]    Patient not seen today secondary to Other (Comment), Patient fatigue (Pt not feeling up to participating this am due to being NPO for sacral debridement surgery.). Will follow-up 11/11/23.

## 2023-11-10 NOTE — PLAN OF CARE
Pt had progressive day OOB to chair, required 1 person  assist back to bed appears to be getting stronger. No C/O pain today. Scheduled for wound debridment tomorrow with Dr. Armas he was at bedside discussed procedure with pt and daughter. Daughter signed consent. Wound cleansed, Triad applied covered with Optifoam and gauze as suggested by Rohini wound nurse

## 2023-11-10 NOTE — SUBJECTIVE & OBJECTIVE
Interval History: Pt was not reporting of any sob, chest pain and was waiting for her surgery for today, otherwise no n/v, urine or bowel problem.     Review of Systems   Constitutional:  Negative for activity change, appetite change, chills and fever.   HENT:  Negative for congestion, ear pain and nosebleeds.    Eyes:  Negative for itching and visual disturbance.   Respiratory:  Negative for apnea, cough, chest tightness, shortness of breath and wheezing.    Cardiovascular:  Negative for chest pain, palpitations and leg swelling.   Gastrointestinal:  Negative for abdominal distention, abdominal pain, constipation, diarrhea, nausea and vomiting.   Genitourinary:  Negative for dysuria and flank pain.   Musculoskeletal:  Negative for back pain.   Neurological:  Negative for dizziness and headaches.     Objective:     Vital Signs (Most Recent):  Temp: 97.8 °F (36.6 °C) (11/10/23 1115)  Pulse: 83 (11/10/23 1300)  Resp: (!) 26 (11/10/23 1300)  BP: 136/70 (11/10/23 1300)  SpO2: 98 % (11/10/23 1300) Vital Signs (24h Range):  Temp:  [97.3 °F (36.3 °C)-98.1 °F (36.7 °C)] 97.8 °F (36.6 °C)  Pulse:  [] 83  Resp:  [13-56] 26  SpO2:  [97 %-100 %] 98 %  BP: (117-161)/(51-70) 136/70     Weight: 77.7 kg (171 lb 4.8 oz)  Body mass index is 30.34 kg/m².    Intake/Output Summary (Last 24 hours) at 11/10/2023 1500  Last data filed at 11/10/2023 1438  Gross per 24 hour   Intake 250 ml   Output 2320 ml   Net -2070 ml         Physical Exam  Vitals reviewed.   Constitutional:       General: She is not in acute distress.     Appearance: Normal appearance. She is not ill-appearing, toxic-appearing or diaphoretic.   HENT:      Head: Normocephalic and atraumatic.      Mouth/Throat:      Mouth: Mucous membranes are moist.      Pharynx: Oropharynx is clear.   Eyes:      General: No scleral icterus.     Conjunctiva/sclera: Conjunctivae normal.   Neck:      Vascular: No carotid bruit.   Cardiovascular:      Rate and Rhythm: Normal rate and  regular rhythm.      Pulses: Normal pulses.      Heart sounds: Normal heart sounds.   Pulmonary:      Effort: Pulmonary effort is normal.      Breath sounds: Normal breath sounds.   Abdominal:      General: Abdomen is flat. Bowel sounds are normal.      Palpations: Abdomen is soft.   Musculoskeletal:         General: Normal range of motion.   Skin:     General: Skin is warm.   Neurological:      General: No focal deficit present.      Mental Status: She is alert and oriented to person, place, and time. Mental status is at baseline.   Psychiatric:         Mood and Affect: Mood normal.         Behavior: Behavior normal.             Significant Labs: All pertinent labs within the past 24 hours have been reviewed.  BMP:   Recent Labs   Lab 11/10/23  0238         K 3.4*      CO2 37*   BUN 28*   CREATININE 1.4   CALCIUM 8.3*   MG 2.2     CBC:   Recent Labs   Lab 11/09/23  0720 11/10/23  0238   WBC 9.37 9.62   HGB 8.6* 8.2*   HCT 28.9* 27.9*    205     CMP:   Recent Labs   Lab 11/09/23  0720 11/10/23  0238   * 145   K 3.4* 3.4*    102   CO2 39* 37*   GLU 85 103   BUN 33* 28*   CREATININE 1.5* 1.4   CALCIUM 8.4* 8.3*   PROT 5.4* 5.4*   ALBUMIN 3.0* 3.0*   BILITOT 0.4 0.4   ALKPHOS 58 61   AST 17 16   ALT 9* 11   ANIONGAP 5* 6*     Magnesium:   Recent Labs   Lab 11/09/23  0720 11/10/23  0238   MG 2.3 2.2       Significant Imaging: I have reviewed all pertinent imaging results/findings within the past 24 hours.

## 2023-11-11 LAB
ALBUMIN SERPL BCP-MCNC: 2.9 G/DL (ref 3.5–5.2)
ALP SERPL-CCNC: 53 U/L (ref 55–135)
ALT SERPL W/O P-5'-P-CCNC: 17 U/L (ref 10–44)
ANION GAP SERPL CALC-SCNC: 6 MMOL/L (ref 8–16)
AST SERPL-CCNC: 25 U/L (ref 10–40)
BASOPHILS # BLD AUTO: 0.03 K/UL (ref 0–0.2)
BASOPHILS NFR BLD: 0.3 % (ref 0–1.9)
BILIRUB SERPL-MCNC: 0.4 MG/DL (ref 0.1–1)
BUN SERPL-MCNC: 20 MG/DL (ref 8–23)
CALCIUM SERPL-MCNC: 8.5 MG/DL (ref 8.7–10.5)
CHLORIDE SERPL-SCNC: 104 MMOL/L (ref 95–110)
CO2 SERPL-SCNC: 36 MMOL/L (ref 23–29)
CREAT SERPL-MCNC: 1.4 MG/DL (ref 0.5–1.4)
DIFFERENTIAL METHOD: ABNORMAL
EOSINOPHIL # BLD AUTO: 0.3 K/UL (ref 0–0.5)
EOSINOPHIL NFR BLD: 2.9 % (ref 0–8)
ERYTHROCYTE [DISTWIDTH] IN BLOOD BY AUTOMATED COUNT: 15.4 % (ref 11.5–14.5)
EST. GFR  (NO RACE VARIABLE): 39 ML/MIN/1.73 M^2
GLUCOSE SERPL-MCNC: 115 MG/DL (ref 70–110)
GLUCOSE SERPL-MCNC: 152 MG/DL (ref 70–110)
GLUCOSE SERPL-MCNC: 166 MG/DL (ref 70–110)
GLUCOSE SERPL-MCNC: 72 MG/DL (ref 70–110)
GLUCOSE SERPL-MCNC: 82 MG/DL (ref 70–110)
HCT VFR BLD AUTO: 31.6 % (ref 37–48.5)
HGB BLD-MCNC: 9.2 G/DL (ref 12–16)
IMM GRANULOCYTES # BLD AUTO: 0.06 K/UL (ref 0–0.04)
IMM GRANULOCYTES NFR BLD AUTO: 0.7 % (ref 0–0.5)
LYMPHOCYTES # BLD AUTO: 2.6 K/UL (ref 1–4.8)
LYMPHOCYTES NFR BLD: 29.4 % (ref 18–48)
MAGNESIUM SERPL-MCNC: 2.1 MG/DL (ref 1.6–2.6)
MCH RBC QN AUTO: 27.5 PG (ref 27–31)
MCHC RBC AUTO-ENTMCNC: 29.1 G/DL (ref 32–36)
MCV RBC AUTO: 94 FL (ref 82–98)
MONOCYTES # BLD AUTO: 0.6 K/UL (ref 0.3–1)
MONOCYTES NFR BLD: 6.3 % (ref 4–15)
NEUTROPHILS # BLD AUTO: 5.4 K/UL (ref 1.8–7.7)
NEUTROPHILS NFR BLD: 60.4 % (ref 38–73)
NRBC BLD-RTO: 0 /100 WBC
PHOSPHATE SERPL-MCNC: 3.5 MG/DL (ref 2.7–4.5)
PLATELET # BLD AUTO: 195 K/UL (ref 150–450)
PMV BLD AUTO: 11.8 FL (ref 9.2–12.9)
POTASSIUM SERPL-SCNC: 4.3 MMOL/L (ref 3.5–5.1)
PROT SERPL-MCNC: 5.2 G/DL (ref 6–8.4)
RBC # BLD AUTO: 3.35 M/UL (ref 4–5.4)
SODIUM SERPL-SCNC: 146 MMOL/L (ref 136–145)
WBC # BLD AUTO: 8.9 K/UL (ref 3.9–12.7)

## 2023-11-11 PROCEDURE — 99900031 HC PATIENT EDUCATION (STAT)

## 2023-11-11 PROCEDURE — 99900035 HC TECH TIME PER 15 MIN (STAT)

## 2023-11-11 PROCEDURE — 92526 ORAL FUNCTION THERAPY: CPT

## 2023-11-11 PROCEDURE — 83735 ASSAY OF MAGNESIUM: CPT | Performed by: SURGERY

## 2023-11-11 PROCEDURE — 63600175 PHARM REV CODE 636 W HCPCS: Performed by: SURGERY

## 2023-11-11 PROCEDURE — 94761 N-INVAS EAR/PLS OXIMETRY MLT: CPT

## 2023-11-11 PROCEDURE — 25000003 PHARM REV CODE 250: Performed by: SURGERY

## 2023-11-11 PROCEDURE — 27000221 HC OXYGEN, UP TO 24 HOURS

## 2023-11-11 PROCEDURE — C9113 INJ PANTOPRAZOLE SODIUM, VIA: HCPCS | Performed by: SURGERY

## 2023-11-11 PROCEDURE — 85025 COMPLETE CBC W/AUTO DIFF WBC: CPT | Performed by: SURGERY

## 2023-11-11 PROCEDURE — 99232 SBSQ HOSP IP/OBS MODERATE 35: CPT | Mod: ,,, | Performed by: INTERNAL MEDICINE

## 2023-11-11 PROCEDURE — 84100 ASSAY OF PHOSPHORUS: CPT | Performed by: SURGERY

## 2023-11-11 PROCEDURE — 25000242 PHARM REV CODE 250 ALT 637 W/ HCPCS: Performed by: SURGERY

## 2023-11-11 PROCEDURE — 94799 UNLISTED PULMONARY SVC/PX: CPT

## 2023-11-11 PROCEDURE — 80053 COMPREHEN METABOLIC PANEL: CPT | Performed by: SURGERY

## 2023-11-11 PROCEDURE — 94640 AIRWAY INHALATION TREATMENT: CPT

## 2023-11-11 PROCEDURE — 94660 CPAP INITIATION&MGMT: CPT

## 2023-11-11 PROCEDURE — 97530 THERAPEUTIC ACTIVITIES: CPT | Mod: CQ

## 2023-11-11 PROCEDURE — 12000002 HC ACUTE/MED SURGE SEMI-PRIVATE ROOM

## 2023-11-11 PROCEDURE — 99232 PR SUBSEQUENT HOSPITAL CARE,LEVL II: ICD-10-PCS | Mod: ,,, | Performed by: INTERNAL MEDICINE

## 2023-11-11 PROCEDURE — 36415 COLL VENOUS BLD VENIPUNCTURE: CPT | Performed by: SURGERY

## 2023-11-11 RX ORDER — PREDNISONE 20 MG/1
20 TABLET ORAL DAILY
Status: DISCONTINUED | OUTPATIENT
Start: 2023-11-12 | End: 2023-11-13 | Stop reason: HOSPADM

## 2023-11-11 RX ADMIN — FUROSEMIDE 20 MG: 20 TABLET ORAL at 09:11

## 2023-11-11 RX ADMIN — ENOXAPARIN SODIUM 70 MG: 80 INJECTION SUBCUTANEOUS at 09:11

## 2023-11-11 RX ADMIN — IPRATROPIUM BROMIDE AND ALBUTEROL SULFATE 3 ML: 2.5; .5 SOLUTION RESPIRATORY (INHALATION) at 07:11

## 2023-11-11 RX ADMIN — HYDROCODONE BITARTRATE AND ACETAMINOPHEN 1 TABLET: 5; 325 TABLET ORAL at 08:11

## 2023-11-11 RX ADMIN — ALLOPURINOL 50 MG: 300 TABLET ORAL at 09:11

## 2023-11-11 RX ADMIN — IPRATROPIUM BROMIDE AND ALBUTEROL SULFATE 3 ML: 2.5; .5 SOLUTION RESPIRATORY (INHALATION) at 03:11

## 2023-11-11 RX ADMIN — ATORVASTATIN CALCIUM 40 MG: 40 TABLET, FILM COATED ORAL at 08:11

## 2023-11-11 RX ADMIN — PANTOPRAZOLE SODIUM 40 MG: 40 INJECTION, POWDER, FOR SOLUTION INTRAVENOUS at 09:11

## 2023-11-11 RX ADMIN — AMIODARONE HYDROCHLORIDE 200 MG: 200 TABLET ORAL at 09:11

## 2023-11-11 RX ADMIN — PREDNISONE 30 MG: 20 TABLET ORAL at 09:11

## 2023-11-11 RX ADMIN — ASPIRIN 81 MG 81 MG: 81 TABLET ORAL at 09:11

## 2023-11-11 RX ADMIN — CLOPIDOGREL BISULFATE 75 MG: 75 TABLET, FILM COATED ORAL at 09:11

## 2023-11-11 RX ADMIN — IPRATROPIUM BROMIDE AND ALBUTEROL SULFATE 3 ML: 2.5; .5 SOLUTION RESPIRATORY (INHALATION) at 02:11

## 2023-11-11 RX ADMIN — IPRATROPIUM BROMIDE AND ALBUTEROL SULFATE 3 ML: 2.5; .5 SOLUTION RESPIRATORY (INHALATION) at 08:11

## 2023-11-11 RX ADMIN — ENOXAPARIN SODIUM 70 MG: 80 INJECTION SUBCUTANEOUS at 08:11

## 2023-11-11 RX ADMIN — MORPHINE SULFATE 4 MG: 4 INJECTION, SOLUTION INTRAMUSCULAR; INTRAVENOUS at 02:11

## 2023-11-11 RX ADMIN — AMIODARONE HYDROCHLORIDE 200 MG: 200 TABLET ORAL at 08:11

## 2023-11-11 RX ADMIN — Medication 6 MG: at 08:11

## 2023-11-11 NOTE — PT/OT/SLP PROGRESS
"Speech Language Pathology Treatment    Patient Name:  Marisol Kerr   MRN:  [id number]  Admitting Diagnosis: Acute respiratory failure with hypoxia and hypercarbia    Recommendations:          General Recommendations:  Dysphagia therapy  Diet recommendations: as tolerated, with recommended precautions --   Minced & Moist Diet - IDDSI Level 5, Liquid Diet Level: Thin liquids - IDDSI Level 0   Aspiration Precautions:  assist for positioning and set-up as needed; Effortful swallow/bolus prep set, 1 bite/sip at a time, Eliminate distractions, HOB to 90 degrees, Meds crushed in puree, Small bites/sips, and Wear oxygen during intake   General Precautions: Standard, aspiration  Communication strategies:       reduce distractions/extraneous sound in room (pt appears hearing impaired)    Assessment:     Marisol Kerr is a 78 y.o. female with an SLP diagnosis of Dysphagia.  Pt reports good tolerance/good appetite with current diet consistency.  She declined eating/drinking during this visit, stating she was full due to eating a lot at breakfast.  Pt alert and verbal at conversation level; speech intelligible; voice dry and clear.  Clinician educated pt regarding swallow safety precautions; pt verbalized understanding.     Subjective     "I at yogurt and grits and ...  I'm surprised that I ate a lot ... I was hungry."  Patient goals: would like wound to heal  more before transfer to nursing facility (recent debridement)    Pain/Comfort:  Pain Rating 1: 7/10 (nursing notified ("where I had my surgery, debridement."))    Respiratory Status:  nasal cannula in place during visit; pt appeared to be breathing comfortably without complaints during visit; clinician returned to room after visit at 1130 to confirm number of liters and noted pt on 2L; pt reported feeling short of breath; clinician notified nursing; nurse spoke with RT and nurse went to room to check on patient    Objective:     Has the patient been evaluated by SLP for " "swallowing?   Yes  Keep patient NPO? No   Current Respiratory Status:    nasal cannula; see above    Pt treated for dysphagia; pt seen bedside after checking with nursing.  Nursing reports no problems with pt taking crushed medication.  Pt reported eating "a lot" at breakfast; did not want to eat during this visit.  Pt identified 2 swallow safety precautions independently ("I swallow hard" and "small bites").  Clinician educated patient regarding remaining precautions; pt verbalized understanding.  Pt asked questions regarding inflation of mattress, turning schedule; pt's questions were relayed to nursing. Clinician educated two members of nursing staff regarding swallow safety precautions; marked primary precautions on dry erase board in pt room.    Goals:   Multidisciplinary Problems       SLP Goals          Problem: SLP    Goal Priority Disciplines Outcome   SLP Goal     SLP Ongoing, Progressing   Description: 1. Pt will tolerate IDDSI 5, 0 diet w/o overt s/s aspiration and w/ timely pharyngeal swallow initiation noted via palpation during >95% of PO intake  2. Pt and family will participate in dysphagia education  3. Pt will recall and demonstrate use of swallowing precautions during >95% of  PO intake                       Plan:     Patient to be seen:  4 x/week   Plan of Care expires:  11/25/23  Plan of Care reviewed with:  patient, other (see comments) (nursing; spoke with two nursing staff members)   SLP Follow-Up:  Yes       Discharge recommendations:  Moderate Intensity Therapy   Barriers to Discharge:   to be determined    Time Tracking:     SLP Treatment Date:   11/11/23  Speech Start Time:  1054  Speech Stop Time:  1110     Speech Total Time (min):  16 min    Billable Minutes: Treatment Swallowing Dysfunction 16    11/11/2023    "

## 2023-11-11 NOTE — PT/OT/SLP PROGRESS
Physical Therapy Treatment    Patient Name:  Marisol Kerr   MRN:  [id number]    Recommendations:     Discharge Recommendations: Moderate Intensity Therapy  Discharge Equipment Recommendations: none  Barriers to discharge:  generalized weakness, impaired endurance, decreased functional mobility, impaired self care skills,     Assessment:     Marisol Kerr is a 78 y.o. female admitted with a medical diagnosis of Acute respiratory failure with hypoxia and hypercarbia.  She presents with the following impairments/functional limitations: weakness, impaired self care skills, impaired endurance, impaired functional mobility, gait instability, impaired balance, decreased upper extremity function, decreased lower extremity function, decreased safety awareness, impaired skin, weakness, impaired self care skills, impaired endurance, impaired functional mobility, gait instability, impaired balance, decreased upper extremity function, decreased lower extremity function, decreased safety awareness, impaired skin .    Pt resting with HOB elevated upon entry. Agreeable to therapy session. CGA given for all bed mobility with verbal cues throughout on proper breathing techniques to maintain for improved 02 saturation. Pt sat EOB with O2 sat ranging from 91-95% requiring frequent vcs and education on proper pursed lip breathing techniques. Education and encouragement rendered on importance and benefit of sitting up throughout the day for improved mobility as well as decrease work of breathing to which patient verbalized understanding. Pt transferred from sitting EOB>supine with CGA. Bed low/locked, SR up x3, bed alarm on, call light within reach, RN present    Rehab Prognosis: Fair; patient would benefit from acute skilled PT services to address these deficits and reach maximum level of function.    Recent Surgery: Procedure(s) (LRB):  DEBRIDEMENT, WOUND, SACRUM (N/A) 1 Day Post-Op    Plan:     During this hospitalization, patient to be  "seen 6 x/week to address the identified rehab impairments via gait training, therapeutic activities, therapeutic exercises and progress toward the following goals:    Plan of Care Expires:  23    Subjective     Chief Complaint: "I'm tired of this cough"  Patient/Family Comments/goals: to get better  Pain/Comfort:  Pain Rating 1: 0/10      Objective:     Communicated with RN prior to session.  Patient found HOB elevated with telemetry, oxygen, bed alarm, pulse ox (continuous), daniel catheter upon PT entry to room.     General Precautions: Standard, aspiration, contact, fall  Orthopedic Precautions: N/A  Braces: N/A  Respiratory Status: Nasal cannula, flow 2 L/min     Functional Mobility:  Bed Mobility:     Rolling Right: contact guard assistance  Scooting: contact guard assistance  Supine to Sit: contact guard assistance  Sit to Supine: contact guard assistance      AM-PAC 6 CLICK MOBILITY          Treatment & Education:  Pt educated on importance of time OOB, importance of intermittent mobility, safe techniques for transfers/ambulation, discharge recommendations/options, and use of call light for assistance and fall prevention.      Patient left HOB elevated with all lines intact, call button in reach, bed alarm on, and RN present..    GOALS:   Multidisciplinary Problems       Physical Therapy Goals          Problem: Physical Therapy    Goal Priority Disciplines Outcome Goal Variances Interventions   Physical Therapy Goal     PT, PT/OT      Description: Goals to be met by: 23     Patient will increase functional independence with mobility by performin. Supine to sit with Supervision  2. Sit to stand transfer with Supervision  3. Bed to chair transfer with Supervision using Rolling Walker  4. Gait  x 150 feet with Supervision using Rolling Walker.                              Time Tracking:     PT Received On: 23  PT Start Time: 919     PT Stop Time: 949  PT Total Time (min): 30 min "     Billable Minutes: Therapeutic Activity 30    Treatment Type: Treatment  PT/PTA: PTA     Number of PTA visits since last PT visit: 1 11/11/2023

## 2023-11-11 NOTE — PROGRESS NOTES
Progress Note  PULMONARY    Admit Date: 11/4/2023 11/11/2023  History of Present Illness:  Pt is a 77 yo female with end stage COPD, chronic resp failure on 8L home O2 who is on hospice. She called EMS and was in distress then became unresponsive. She was found to be hypercapneic intubated in ED before her hospice status was known. Her daughter is at bedside and wishes to continue vent support for now hoping she may improve.  Pt has been at home on hospice but daughter states she wants her to go to NH as she hasn't been doing well at home.    SUBJECTIVE:     11/5- continues on vent, with SAT/SBT pt wakes and follows commands but tachypneic, tachycardic and hypertensive so failed SBT. Daughter at bedside actively participating. Pt hard of hearing and has her hearing aid R ear    11/6 patient lasted 15 minutes on her sedation vacation and spontaneous breathing trial this morning.  I do not have an ABG.  That is being corrected.  The patient was on hospice.  I have spoken with her daughter who states EMS was called 1st, not hospice.  She was intubated before hospice was ever notified.  The daughter would like to give her 4-7 days on the ventilator and see if she can improve.  I told her that I am not hopeful of this.  The patient was on 8 L of oxygen at home.    11/7 the patient failed her sedation vacation.  The nurse reports 5 minutes, the Respiratory therapist reports 20.  We will repeat it.      11/8 the patient was successfully extubated yesterday.  She wore BiPAP overnight at 12/6.  Her ABG this morning was good.  Tried to talk to her about her code/intubation situation but could not get understanding from the patient.  Will try to revisit this when her daughter gets here.    11/9 the patient does not feel well this morning she feels yucky.  Her breathing is okay.  Physical therapy is here to evaluate her.    11/10 the patient has breakdown on her chin from her BiPAP mask but like sleeping on the BiPAP.  It  makes her feel better.  It is appropriate as she has hypercapnic hypoxemic respiratory failure.  She is going to have her sacral decubitus debrided today.  It is okay with me if she goes to [facility] after this.    11/11 the patient is without complaints.  She is getting used to the concept of living in a nursing home.  Her sacral decubitus was debrided yesterday.  She appears ready to transfer.    OBJECTIVE:     Vitals (Most recent):  Vitals:    11/11/23 1100   BP: (!) 184/67   Pulse: 85   Resp: 19   Temp: 98.5 °F (36.9 °C)       Vitals (24 hour range):  Temp:  [97.9 °F (36.6 °C)-98.5 °F (36.9 °C)]   Pulse:  [63-99]   Resp:  [15-26]   BP: (111-189)/(46-74)   SpO2:  [88 %-100 %]       Intake/Output Summary (Last 24 hours) at 11/11/2023 1340  Last data filed at 11/11/2023 0900  Gross per 24 hour   Intake 1028.23 ml   Output 1025 ml   Net 3.23 ml          PHYSICAL EXAM  GENERAL: Older patient in no distress, on 2 L nasal cannula.  HEENT: Pupils equal and reactive. Extraocular movements intact. Nose   Pharynx moist  NECK: Supple.   HEART: Regular rate and rhythm. No murmur or gallop auscultated.  LUNGS:  There are clear to auscultation..  Lung excursion symmetrical. No change in fremitus.   ABDOMEN: Bowel sounds present. Non-tender, no masses palpated.  : Normal anatomy.  Montes with yellow urine  EXTREMITIES: Normal muscle tone and joint movement, no cyanosis or clubbing.  The heels are looking better.  There is an eschar over the 3rd toe on the right foot.  Right midline.  LYMPHATICS: No adenopathy palpated, tr edema.  SKIN: Dry multiple abrasions and eschars, decubitus over her coccyx is dressed and debrided  NEURO:  Awake, in hopeful mood  PSYCH:  Quiet      Radiographs reviewed: view by direct vision   Chest x-ray 11/08/2023  There are faint linear opacities at the lung bases suggestive of atelectasis/scarring, aspiration/pneumonia or trace edema in the appropriate clinical setting. There is blunting of both  costophrenic angles consistent with trace pleural effusions and adjacent atelectasis. No pneumothorax is identified. The heart is top normal in size. Osseous structures appear unchanged. The visualized upper abdomen is unremarkable       TTE 8/26/23-     Left Ventricle: The left ventricle is normal in size. Moderately increased ventricular mass. Moderately increased wall thickness. There is moderate concentrict hypertrophy. Normal wall motion. There is normal systolic function.  EF 61% There is normal diastolic function.    Left Atrium: Left atrium is moderately dilated.    Right Ventricle: Normal right ventricular cavity size. Wall thickness is normal. Right ventricle wall motion  is normal. Systolic function is normal.    Mitral Valve: Mildly thickened anterior leaflet. Mild mitral annular calcification.    IVC/SVC: Normal venous pressure at 3 mmHg.    Pericardium: There is an effusion.    Limited echo; no gross pulmonary hypertension but poor visualization of tricuspid signal    No tissue Doppler performed to assess mean left atrial pressure    Labs     Recent Labs   Lab 11/11/23  0410   WBC 8.90   HGB 9.2*   HCT 31.6*        Recent Labs   Lab 11/11/23  0410   *   K 4.3      CO2 36*   BUN 20   CREATININE 1.4   GLU 72   CALCIUM 8.5*   MG 2.1   PHOS 3.5   AST 25   ALT 17   ALKPHOS 53*   BILITOT 0.4   PROT 5.2*   ALBUMIN 2.9*     Microbiology Results (last 7 days)       Procedure Component Value Units Date/Time    Culture, Anaerobic [[id number]] Collected: 11/10/23 1443    Order Status: Completed Specimen: Wound from Sacrum Updated: 11/11/23 1140     Anaerobic Culture Culture in progress    Narrative:      Sacral wound- Culture    Aerobic culture [[id number]] Collected: 11/10/23 1443    Order Status: Completed Specimen: Wound from Sacrum Updated: 11/11/23 1139     Aerobic Bacterial Culture No growth    Narrative:      Sacral wound- Culture    Blood Culture #2 **CANNOT BE ORDERED STAT**  [[id number]] Collected: 11/04/23 1552    Order Status: Completed Specimen: Blood Updated: 11/09/23 1632     Blood Culture, Routine No growth after 5 days.    Narrative:      Collection has been rescheduled by [facility] at 11/04/2023 12:28 Reason:   Unable to collect 2nd set  Collection has been rescheduled by [facility] at 11/04/2023 12:28 Reason:   Unable to collect 2nd set    Blood Culture #1 **CANNOT BE ORDERED STAT** [[id number]] Collected: 11/04/23 1204    Order Status: Completed Specimen: Blood Updated: 11/09/23 1432     Blood Culture, Routine No growth after 5 days.    Urine culture [[id number]] Collected: 11/04/23 1134    Order Status: Completed Specimen: Urine from Clean Catch Updated: 11/06/23 1646     Urine Culture, Routine No growth    Culture, Respiratory with Gram Stain [[id number]]  (Abnormal)  (Susceptibility) Collected: 11/04/23 1122    Order Status: Completed Specimen: Respiratory from Sputum Updated: 11/06/23 0706     Respiratory Culture KLEBSIELLA PNEUMONIAE  Few       Gram Stain (Respiratory) <10 epithelial cells per low power field.     Gram Stain (Respiratory) Few WBC's     Gram Stain (Respiratory) Few Gram negative rods    MRSA Screen by PCR [[id number]]  (Abnormal) Collected: 11/04/23 1609    Order Status: Completed Specimen: Nasopharyngeal Swab from Nasal Updated: 11/04/23 1736     MRSA SCREEN BY PCR Positive     Comment: Positive MRSA by PCR called and verbal readback obtained from [facility], RN. by [facility] 11/04/2023 17:36         Narrative:      Positive MRSA by PCR called and verbal readback obtained from [facility], RN. by [facility] 11/04/2023 17:36    Urine Culture High Risk [[id number]]     Order Status: Completed Specimen: Urine             Impression:  Active Hospital Problems    Diagnosis  POA    *Acute respiratory failure with hypoxia and hypercarbia [J96.01, J96.02]  Yes    Hypernatremia [E87.0]  Unknown    Pressure injury, unstageable [L89.95]  Yes    Atrial fibrillation [I48.91]   No    Pneumonia  [J18.9]  Yes    COPD exacerbation [J44.1]  Yes    Acute on chronic heart failure with preserved ejection fraction (HFpEF) [I50.33]  Yes    Hyperkalemia [E87.5]  Yes    Chronic kidney disease, stage 4 (severe) [N18.4]  Yes    DM type 2, controlled, with complication [E11.8]  Yes    Essential hypertension, benign [I10]  Yes      Resolved Hospital Problems    Diagnosis Date Resolved POA    Leukocytosis [D72.829] 11/09/2023 Yes           Assessment/Plan:     Acute on chronic hypercapneic/hypoxemic respiratory failure with mechanical ventilation  - on 8 L of oxygen at home  - extubated and on 2 L nasal cannula  - slept on BiPAP, will continue this nightly  Acute on chronic diastolic CHF  Bilateral pleural effusions  - on Lasix 20mg po, creatinine stable  - continuing to diurese  - will check chest x-ray  COPD with acute exacerbation  - resolved  - no wheezing auscultated  - continue DuoNebs to q.6  - wean prednisone  Right lower lobe pneumonia  - Klebsiella pneumoniae  - treated  Chest pain   - known coronary artery disease and recent stents  - she is anticoagulated and receiving Plavix and aspirin  Acute on chronic renal failure  - creatinine stable  Atrial fibrillation  - on amiodarone  Coronary artery disease  - recent stents  Anemia  - chronic disease  Hyperglycemia and diabetes mellitus  Hypernatremia  - continue Lasix 20 p.o.  - begin oral diet  Hypophosphatemia  - replace and trend  Troponin leak  Mild hypoalbuminemia  Decubiti to heels and sacrum  - offload pressure to heels  - sacral decubitus has been debrided  Leukocytosis  - resolved  Dysphagia  - modified diet noted    Patient was a hospice patient on admission.  Will need to talk with her daughter we have left her a DNR.  Plans for skilled nursing facility to nursing home care noted  The patient is appropriate to be back in hospice  No objection to patient going to [facility] after her debridement daughter now wants her at [facility]  Continue  BiPAP nightly        Will continue current dose of enoxaparin with the AFib on this admission  Protonix for GI prophylaxis

## 2023-11-11 NOTE — CARE UPDATE
11/11/23 0725   Patient Assessment/Suction   Level of Consciousness (AVPU) alert   Respiratory Effort Normal;Unlabored   Expansion/Accessory Muscles/Retractions no use of accessory muscles   All Lung Fields Breath Sounds Anterior:;clear   Rhythm/Pattern, Respiratory assisted mechanically   PRE-TX-O2   Device (Oxygen Therapy) BIPAP   $ Is the patient on Low Flow Oxygen? Yes   Oxygen Concentration (%) 30   SpO2 99 %   Pulse Oximetry Type Intermittent   $ Pulse Oximetry - Multiple Charge Pulse Oximetry - Multiple   Pulse 99   Resp 20   Temp 98.4 °F (36.9 °C)   BP (!) 170/70   Aerosol Therapy   $ Aerosol Therapy Charges Aerosol Treatment   Daily Review of Necessity (SVN) completed   Respiratory Treatment Status (SVN) given   Treatment Route (SVN) in-line;mask  (BIPAP)   Patient Position (SVN) HOB elevated   Post Treatment Assessment (SVN) breath sounds unchanged;vital signs unchanged   Signs of Intolerance (SVN) none   Preset CPAP/BiPAP Settings   Mode Of Delivery BiPAP S/T   $ CPAP/BiPAP Daily Charge BiPAP/CPAP Daily   $ Initial CPAP/BiPAP Setup? Yes   $ Is patient using? Yes   Size of Mask Medium/Large   Sized Appropriately? Yes   Equipment Type V60   Airway Device Type medium full face mask   Humidifier not applicable   CPAP (cm H2O) 12   Ipap 6   ITime (sec) 1   Rise Time (sec) 3   Patient CPAP/BiPAP Settings   IPAP Rise Time (sec) 3   RR Total (Breaths/Min) 17   Tidal Volume (mL) 509   VE Minute Ventilation (L/min) 8.6 L/min   Peak Inspiratory Pressure (cm H2O) 13   TiTOT (%) 30   Total Leak (L/Min) 0   Patient Trigger - ST Mode Only (%) 97   CPAP/BiPAP Backup Settings   IPAP Backup 12 cmH2O   EPAP Backup 6 cmH2O   Backup Rate 12 breaths per minute (bpm)   FIO2 Backup 30 %   ITIME Backup 1 seconds   Rise Time Backup 3 seconds   CPAP/BiPAP Alarms   High Pressure (cm H2O) 30   Low Pressure (cm H2O) 10   Minute Ventilation (L/Min) 3   High RR (breaths/min) 30   Low RR (breaths/min) 10   Apnea (Sec) 20   Education    $ Education BiPAP;Bronchodilator;15 min   Respiratory Evaluation   $ Care Plan Tech Time 15 min

## 2023-11-11 NOTE — PT/OT/SLP PROGRESS
Occupational Therapy   Treatment    Name: Marisol Kerr  MRN: [id number]  Admitting Diagnosis:  Acute respiratory failure with hypoxia and hypercarbia  1 Day Post-Op  The primary encounter diagnosis was Acute respiratory failure with hypoxia and hypercarbia. Diagnoses of Altered mental status, Shortness of breath, Knee contusion, Acute hypoxemic respiratory failure, Chest pain, Arrhythmia, Hypoxemia, and Pressure injury, unstageable, unspecified location were also pertinent to this visit.  Pre-op Diagnosis: Pressure injury, unstageable, unspecified location [L89.95]  Procedure(s):  DEBRIDEMENT, WOUND, SACRUM     Recommendations:     Discharge Recommendations: Moderate Intensity Therapy  Discharge Equipment Recommendations:  none  Barriers to discharge:  Decreased caregiver support    Assessment:     Marisol Kerr is a 78 y.o. female with a medical diagnosis of Acute respiratory failure with hypoxia and hypercarbia.  She presents with Performance deficits affecting function are weakness, impaired endurance, impaired self care skills, impaired functional mobility, gait instability, impaired balance, decreased upper extremity function, decreased lower extremity function, decreased safety awareness, impaired skin, impaired cardiopulmonary response to activity.       Pt agreeable to OT.  Pt exhibited poor cardiopulmonary endurance. She performed bed mobility with SBA to roll with side rail. Supine<>sit min a, sit<>stand min A-CGA with RW, ambulated CGA side stepping ~10' with Rw; SpO2 84% with activity on 2L, extra time and PLB needed to recover. Pt min A for g/hygiene to brush back of hair other wise SBA seated EOB for other tasks. She performed UE dressing min a to don/doff robe seated EOB. Pt not back to indep baseline and and would benefit from continued OT and Post acute therapy before discharging home.   Rehab Prognosis:  Good; patient would benefit from acute skilled OT services to address these deficits and reach  maximum level of function.       Plan:     Patient to be seen 5 x/week to address the above listed problems via self-care/home management, therapeutic activities, therapeutic exercises  Plan of Care Expires: 12/09/23  Plan of Care Reviewed with: patient, daughter    Subjective     Chief Complaint: none  Patient/Family Comments/goals: I am going to surgery today.  Pain/Comfort:       Objective:     Communicated with: nurse prior to session.  Patient found right sidelying with telemetry, oxygen, blood pressure cuff, bed alarm, pulse ox (continuous), daniel catheter upon OT entry to room.    General Precautions: Standard, fall, aspiration, respiratory, NPO, hearing impaired, diabetic, contact    Orthopedic Precautions:N/A  Braces: N/A  Respiratory Status: Nasal cannula, flow 2 L/min     Occupational Performance:     Bed Mobility:    Patient completed Rolling/Turning to Left with  stand by assistance and with side rail  Patient completed Scooting/Bridging with minimum assistance  Patient completed Supine to Sit with minimum assistance and with side rail  Patient completed Sit to Supine with minimum assistance and with side rail     Functional Mobility/Transfers:  Patient completed Sit <> Stand Transfer with contact guard assistance and minimum assistance  with  rolling walker   Functional Mobility: side stepped 4 feet to R and left with CGA using RW; pacing and PLB needed 2/2 O2 desat and increased RR.    Activities of Daily Living:  Grooming: minimum assistance to brush back of hair, SBA washe face and hands; declined to clean dentures seated EOB  Upper Body Dressing: minimum assistance to don/doff robe seated EOB  Lower Body Dressing: total assistance socks seated EOB and long sit in bed      Treatment & Education:  Purpose of OT and POC  Pt. seen for self care retraining and functional mobility retraining with adapted techniques and modifications as stated above.  All questions and concerns addressed within  scope.  Pt. Instructed and implemented energy conservation tech wit self care and fx mobility as stated above.     Patient left left sidelying with all lines intact, call button in reach, and bed alarm on    GOALS:   Multidisciplinary Problems       Occupational Therapy Goals          Problem: Occupational Therapy    Goal Priority Disciplines Outcome Interventions   Occupational Therapy Goal     OT, PT/OT     Description: Goals to be met by: 12/9/23     Patient will increase functional independence with ADLs by performing:    UE Dressing with Supervision.  LE Dressing with Supervision and Assistive Devices as needed.  Grooming while seated with Supervision.  Toileting from bedside commode with Supervision for hygiene and clothing management.   Toilet transfer to bedside commode with Supervision.                         Time Tracking:     OT Date of Treatment: 11/10/23  OT Start Time: 1300  OT Stop Time: 1340  OT Total Time (min): 40 min    Billable Minutes:Self Care/Home Management 25  Therapeutic Activity 15  Total Time 40    OT/LONA: OT          11/11/2023

## 2023-11-11 NOTE — SUBJECTIVE & OBJECTIVE
Interval History: Pt was happy this morning was reporting of having breathing problem at baseline which she thinks going to stay like this. Otherwise no chest pain, sob, fever, chills, n/v, urine or bowel problem.     Review of Systems   Constitutional:  Negative for activity change, appetite change, chills and fever.   HENT:  Negative for congestion, ear pain and nosebleeds.    Eyes:  Negative for itching and visual disturbance.   Respiratory:  Negative for apnea, cough, chest tightness, shortness of breath and wheezing.    Cardiovascular:  Negative for chest pain, palpitations and leg swelling.   Gastrointestinal:  Negative for abdominal distention, abdominal pain, constipation, diarrhea, nausea and vomiting.   Genitourinary:  Negative for dysuria and flank pain.   Musculoskeletal:  Negative for back pain.   Neurological:  Negative for dizziness and headaches.     Objective:     Vital Signs (Most Recent):  Temp: 98.5 °F (36.9 °C) (11/11/23 1100)  Pulse: 85 (11/11/23 1100)  Resp: 19 (11/11/23 1100)  BP: (!) 184/67 (11/11/23 1100)  SpO2: 96 % (11/11/23 1100) Vital Signs (24h Range):  Temp:  [97.9 °F (36.6 °C)-98.5 °F (36.9 °C)] 98.5 °F (36.9 °C)  Pulse:  [63-99] 85  Resp:  [15-26] 19  SpO2:  [88 %-100 %] 96 %  BP: (111-189)/(46-74) 184/67     Weight: 77.7 kg (171 lb 4.8 oz)  Body mass index is 30.34 kg/m².    Intake/Output Summary (Last 24 hours) at 11/11/2023 1345  Last data filed at 11/11/2023 0900  Gross per 24 hour   Intake 1028.23 ml   Output 1025 ml   Net 3.23 ml         Physical Exam  Vitals reviewed.   Constitutional:       General: She is not in acute distress.     Appearance: Normal appearance. She is not ill-appearing, toxic-appearing or diaphoretic.   HENT:      Head: Normocephalic and atraumatic.      Mouth/Throat:      Mouth: Mucous membranes are moist.      Pharynx: Oropharynx is clear.   Eyes:      General: No scleral icterus.     Conjunctiva/sclera: Conjunctivae normal.   Neck:      Vascular: No  carotid bruit.   Cardiovascular:      Rate and Rhythm: Normal rate and regular rhythm.      Pulses: Normal pulses.      Heart sounds: Normal heart sounds.   Pulmonary:      Effort: Pulmonary effort is normal.      Breath sounds: Normal breath sounds.   Abdominal:      General: Abdomen is flat. Bowel sounds are normal.      Palpations: Abdomen is soft.   Musculoskeletal:         General: Normal range of motion.   Skin:     General: Skin is warm.   Neurological:      General: No focal deficit present.      Mental Status: She is alert and oriented to person, place, and time. Mental status is at baseline.   Psychiatric:         Mood and Affect: Mood normal.         Behavior: Behavior normal.             Significant Labs: All pertinent labs within the past 24 hours have been reviewed.  BMP:   Recent Labs   Lab 11/11/23  0410   GLU 72   *   K 4.3      CO2 36*   BUN 20   CREATININE 1.4   CALCIUM 8.5*   MG 2.1     CBC:   Recent Labs   Lab 11/10/23  0238 11/11/23  0410   WBC 9.62 8.90   HGB 8.2* 9.2*   HCT 27.9* 31.6*    195     CMP:   Recent Labs   Lab 11/10/23  0238 11/11/23  0410    146*   K 3.4* 4.3    104   CO2 37* 36*    72   BUN 28* 20   CREATININE 1.4 1.4   CALCIUM 8.3* 8.5*   PROT 5.4* 5.2*   ALBUMIN 3.0* 2.9*   BILITOT 0.4 0.4   ALKPHOS 61 53*   AST 16 25   ALT 11 17   ANIONGAP 6* 6*     Magnesium:   Recent Labs   Lab 11/10/23  0238 11/11/23  0410   MG 2.2 2.1       Significant Imaging: I have reviewed all pertinent imaging results/findings within the past 24 hours.

## 2023-11-11 NOTE — PROGRESS NOTES
"[facility]  Progress Note    Patient Name: Marisol Kerr  MRN: [id number]  Patient Class: IP- Inpatient   Admission Date: 11/4/2023  Length of Stay: 7 days  Attending Physician: Randolph Merritt MD  Primary Care Provider: Khadar Dwyer MD        Subjective:     Principal Problem:Acute respiratory failure with hypoxia and hypercarbia        HPI:  Marisol Kerr is a 78 y.o. White female   With PMH of HFpEF, COPD,   CAD, DM2, HTN,   who presents with SOB.     Pt currently intubated  Per EMS report, pt was found gasping for air  Said "help me, help me"  then went unresponsive  She was seated, no head trauma  Pt intubated before arrival to ER  No cardiac arrest occured     Pt was DNR, DNI + on hospice  but hospice + DNR/DNI has been revoked by her daughter      Overview/Hospital Course:  Patient admitted for acute hypercapnic respiratory failure secondary to COPD exacerbation.  Chest x-ray showed possible aspiration.  Patient was intubated before arrival to the emergency room.  Admitted to the ICU.  Pulmonology consulted.  Patient started on pressor support, given IV diuretics for concern of fluid overload.  Started on empiric IV antibiotics.  Steroids and inhalers.  Weaned off pressors.  Difficult weaning off of mechanical ventilation.  Sputum cultures were positive for Klebsiella pneumoniae.  Patient developed atrial fibrillation with rapid RVR, amiodarone drip was started.  Drip was discontinued patient was started on p.o. amiodarone.  Patient was successfully extubated on 11/07/2023.  Was placed on BiPAP at night.  Oxygenation improved.  IV steroids converted to p.o..  IV Lasix down titrated.      Interval History: Pt was happy this morning was reporting of having breathing problem at baseline which she thinks going to stay like this. Otherwise no chest pain, sob, fever, chills, n/v, urine or bowel problem.     Review of Systems   Constitutional:  Negative for activity change, appetite " change, chills and fever.   HENT:  Negative for congestion, ear pain and nosebleeds.    Eyes:  Negative for itching and visual disturbance.   Respiratory:  Negative for apnea, cough, chest tightness, shortness of breath and wheezing.    Cardiovascular:  Negative for chest pain, palpitations and leg swelling.   Gastrointestinal:  Negative for abdominal distention, abdominal pain, constipation, diarrhea, nausea and vomiting.   Genitourinary:  Negative for dysuria and flank pain.   Musculoskeletal:  Negative for back pain.   Neurological:  Negative for dizziness and headaches.     Objective:     Vital Signs (Most Recent):  Temp: 98.5 °F (36.9 °C) (11/11/23 1100)  Pulse: 85 (11/11/23 1100)  Resp: 19 (11/11/23 1100)  BP: (!) 184/67 (11/11/23 1100)  SpO2: 96 % (11/11/23 1100) Vital Signs (24h Range):  Temp:  [97.9 °F (36.6 °C)-98.5 °F (36.9 °C)] 98.5 °F (36.9 °C)  Pulse:  [63-99] 85  Resp:  [15-26] 19  SpO2:  [88 %-100 %] 96 %  BP: (111-189)/(46-74) 184/67     Weight: 77.7 kg (171 lb 4.8 oz)  Body mass index is 30.34 kg/m².    Intake/Output Summary (Last 24 hours) at 11/11/2023 1345  Last data filed at 11/11/2023 0900  Gross per 24 hour   Intake 1028.23 ml   Output 1025 ml   Net 3.23 ml         Physical Exam  Vitals reviewed.   Constitutional:       General: She is not in acute distress.     Appearance: Normal appearance. She is not ill-appearing, toxic-appearing or diaphoretic.   HENT:      Head: Normocephalic and atraumatic.      Mouth/Throat:      Mouth: Mucous membranes are moist.      Pharynx: Oropharynx is clear.   Eyes:      General: No scleral icterus.     Conjunctiva/sclera: Conjunctivae normal.   Neck:      Vascular: No carotid bruit.   Cardiovascular:      Rate and Rhythm: Normal rate and regular rhythm.      Pulses: Normal pulses.      Heart sounds: Normal heart sounds.   Pulmonary:      Effort: Pulmonary effort is normal.      Breath sounds: Normal breath sounds.   Abdominal:      General: Abdomen is flat.  Bowel sounds are normal.      Palpations: Abdomen is soft.   Musculoskeletal:         General: Normal range of motion.   Skin:     General: Skin is warm.   Neurological:      General: No focal deficit present.      Mental Status: She is alert and oriented to person, place, and time. Mental status is at baseline.   Psychiatric:         Mood and Affect: Mood normal.         Behavior: Behavior normal.             Significant Labs: All pertinent labs within the past 24 hours have been reviewed.  BMP:   Recent Labs   Lab 11/11/23 0410   GLU 72   *   K 4.3      CO2 36*   BUN 20   CREATININE 1.4   CALCIUM 8.5*   MG 2.1     CBC:   Recent Labs   Lab 11/10/23  0238 11/11/23  0410   WBC 9.62 8.90   HGB 8.2* 9.2*   HCT 27.9* 31.6*    195     CMP:   Recent Labs   Lab 11/10/23  0238 11/11/23  0410    146*   K 3.4* 4.3    104   CO2 37* 36*    72   BUN 28* 20   CREATININE 1.4 1.4   CALCIUM 8.3* 8.5*   PROT 5.4* 5.2*   ALBUMIN 3.0* 2.9*   BILITOT 0.4 0.4   ALKPHOS 61 53*   AST 16 25   ALT 11 17   ANIONGAP 6* 6*     Magnesium:   Recent Labs   Lab 11/10/23  0238 11/11/23  0410   MG 2.2 2.1       Significant Imaging: I have reviewed all pertinent imaging results/findings within the past 24 hours.      Assessment/Plan:      * Acute respiratory failure with hypoxia and hypercarbia  Looking better today  C/w breathing rx and diuretics    Can be downgraded to med tele    Pressure injury, unstageable  POD#1- pending Cx results  Pain is better    Hypernatremia  From dehydration -   Encourage oral intake      Atrial fibrillation  No reported history of atrial fibrillation, EKG showed atrial fibrillation with rapid ventricular response  Cardiology notified, patient started amiodarone drip.  This was discontinued, amiodarone p.o. started.  Patient on full-dose Lovenox.    Pneumonia   Sputum cultures grew Klebsiella pneumoniae   discontinue vancomycin and cefepime, Rocephin started by  pulmonology        COPD exacerbation  C/w Abx, breathing rx, steroids and oxygen as needed    Acute on chronic heart failure with preserved ejection fraction (HFpEF)  Patient is identified as having Diastolic (HFpEF) heart failure that is Acute on chronic. CHF is currently controlled. Latest ECHO performed and demonstrates- Results for orders placed during the hospital encounter of 08/25/23    Echo Saline Bubble? No    Interpretation Summary    Left Ventricle: The left ventricle is normal in size. Moderately increased ventricular mass. Moderately increased wall thickness. There is moderate concentrict hypertrophy. Normal wall motion. There is normal systolic function.  EF 61% There is normal diastolic function.    Left Atrium: Left atrium is moderately dilated.    Right Ventricle: Normal right ventricular cavity size. Wall thickness is normal. Right ventricle wall motion  is normal. Systolic function is normal.    Mitral Valve: Mildly thickened anterior leaflet. Mild mitral annular calcification.    IVC/SVC: Normal venous pressure at 3 mmHg.    Pericardium: There is an effusion.    Limited echo; no gross pulmonary hypertension but poor visualization of tricuspid signal    No tissue Doppler performed to assess mean left atrial pressure    Lasix decreased to  20 mg p.o. daily     Recent Labs   Lab 11/04/23  1057   BNP 1,995*   .    Hyperkalemia  resolved      Chronic kidney disease, stage 4 (severe)  Creatine stable for now. BMP reviewed- noted Estimated Creatinine Clearance: 29.5 mL/min (A) (based on SCr of 1.6 mg/dL (H)). according to latest data. Based on current GFR, CKD stage is stage 3 - GFR 30-59.  Monitor UOP and serial BMP and adjust therapy as needed. Renally dose meds. Avoid nephrotoxic medications and procedures.    DM type 2, controlled, with complication  Patient's FSGs are controlled on current medication regimen.  Last A1c reviewed-   Lab Results   Component Value Date    HGBA1C 5.0 08/25/2023  "    Most recent fingerstick glucose reviewed- No results for input(s): "POCTGLUCOSE" in the last 24 hours.  Current correctional scale  Low  Maintain anti-hyperglycemic dose as follows-   Antihyperglycemics (From admission, onward)    Start     Stop Route Frequency Ordered    11/04/23 1439  insulin aspart U-100 pen 0-10 Units         -- SubQ Every 6 hours PRN 11/04/23 1341        Hold Oral hypoglycemics while patient is in the hospital.    Essential hypertension, benign  Holding outpatient blood pressure medication given hypotension and pressor requirement   We will resume once blood pressure is more stable      VTE Risk Mitigation (From admission, onward)         Ordered     enoxaparin injection 70 mg  Every 12 hours         11/10/23 0718     IP VTE HIGH RISK PATIENT  Once         11/04/23 1341     Place sequential compression device  Until discontinued         11/04/23 1341                Discharge Planning   LUZ: 11/13/2023     Code Status: DNR   Is the patient medically ready for discharge?:     Reason for patient still in hospital (select all that apply): Treatment  Discharge Plan A: New Nursing Home placement - [facility] care facility                  Randolph Merritt MD  Department of Hospital Medicine   [facility]  "

## 2023-11-11 NOTE — PLAN OF CARE
11/1945   Patient Assessment/Suction   Level of Consciousness (AVPU) alert   Respiratory Effort Normal;Unlabored   All Lung Fields Breath Sounds clear;diminished   Rhythm/Pattern, Respiratory depth regular;pattern regular;unlabored   Skin Integrity   $ Wound Care Tech Time 15 min   Area Observed Bridge of nose   Skin Appearance without discoloration   PRE-TX-O2   Device (Oxygen Therapy) nasal cannula   $ Is the patient on Low Flow Oxygen? Yes   Flow (L/min) 2   SpO2 95 %   Pulse Oximetry Type Intermittent   $ Pulse Oximetry - Multiple Charge Pulse Oximetry - Multiple   Pulse 86   Resp 18   Aerosol Therapy   $ Aerosol Therapy Charges Aerosol Treatment   Daily Review of Necessity (SVN) completed   Respiratory Treatment Status (SVN) given   Treatment Route (SVN) mask;oxygen   Patient Position (SVN) Solorzano's;HOB elevated   Post Treatment Assessment (SVN) breath sounds unchanged   Signs of Intolerance (SVN) none   Preset CPAP/BiPAP Settings   Mode Of Delivery Standby;BiPAP S/T   $ CPAP/BiPAP Daily Charge BiPAP/CPAP Daily   $ Initial CPAP/BiPAP Setup? No   $ Is patient using? Yes   Equipment Type V60   Ipap 12   EPAP (cm H2O) 6   Pressure Support (cm H2O) 6   Set Rate (Breaths/Min) 12   ITime (sec) 1   Rise Time (sec) 3   CPAP/BiPAP Alarms   High Pressure (cm H2O) 30   Low Pressure (cm H2O) 10   Minute Ventilation (L/Min) 3   High RR (breaths/min) 45   Low RR (breaths/min) 5   Apnea (Sec) 20   Education   $ Education Bronchodilator;BiPAP;15 min   Respiratory Evaluation   $ Care Plan Tech Time 15 min

## 2023-11-11 NOTE — NURSING
When release by surgery   Recommendation: sacral  Clean with chlorhexidine/ns.  Pat dry. Apply Medihoney and cover with  aquacel ag . Cover with  mepilex daily

## 2023-11-11 NOTE — NURSING
Nurses Note -- 4 Eyes      11/11/2023   2:48 AM      Skin assessed during: Transfer      [] No Altered Skin Integrity Present    []Prevention Measures Documented      [x] Yes- Altered Skin Integrity Present or Discovered   [x] LDA Added if Not in Epic (Describe Wound)   [x] New Altered Skin Integrity was Present on Admit and Documented in LDA   [] Wound Image Taken    Wound Care Consulted? Yes    Attending Nurse:  Tiffany Jordan RN/Staff Member:  [id number]

## 2023-11-11 NOTE — NURSING
0700: report received, in bed with no distress, pleasant and cooperative  1330[facility] time of incident, patient in no distress  1408:  medicated for pain  1605: daniel removed, cardiac monitor removed and  placed at desk with dasia applied by tech.

## 2023-11-12 LAB
ALBUMIN SERPL BCP-MCNC: 3 G/DL (ref 3.5–5.2)
ALP SERPL-CCNC: 57 U/L (ref 55–135)
ALT SERPL W/O P-5'-P-CCNC: 14 U/L (ref 10–44)
ANION GAP SERPL CALC-SCNC: 3 MMOL/L (ref 8–16)
AST SERPL-CCNC: 15 U/L (ref 10–40)
BACTERIA SPEC ANAEROBE CULT: NORMAL
BASOPHILS # BLD AUTO: 0.03 K/UL (ref 0–0.2)
BASOPHILS NFR BLD: 0.3 % (ref 0–1.9)
BILIRUB SERPL-MCNC: 0.4 MG/DL (ref 0.1–1)
BUN SERPL-MCNC: 19 MG/DL (ref 8–23)
CALCIUM SERPL-MCNC: 8.5 MG/DL (ref 8.7–10.5)
CHLORIDE SERPL-SCNC: 103 MMOL/L (ref 95–110)
CO2 SERPL-SCNC: 38 MMOL/L (ref 23–29)
CREAT SERPL-MCNC: 1.5 MG/DL (ref 0.5–1.4)
DIFFERENTIAL METHOD: ABNORMAL
EOSINOPHIL # BLD AUTO: 0.2 K/UL (ref 0–0.5)
EOSINOPHIL NFR BLD: 1.6 % (ref 0–8)
ERYTHROCYTE [DISTWIDTH] IN BLOOD BY AUTOMATED COUNT: 15.4 % (ref 11.5–14.5)
EST. GFR  (NO RACE VARIABLE): 35.9 ML/MIN/1.73 M^2
GLUCOSE SERPL-MCNC: 120 MG/DL (ref 70–110)
GLUCOSE SERPL-MCNC: 168 MG/DL (ref 70–110)
GLUCOSE SERPL-MCNC: 195 MG/DL (ref 70–110)
GLUCOSE SERPL-MCNC: 51 MG/DL (ref 70–110)
GLUCOSE SERPL-MCNC: 70 MG/DL (ref 70–110)
GLUCOSE SERPL-MCNC: 86 MG/DL (ref 70–110)
HCT VFR BLD AUTO: 32.7 % (ref 37–48.5)
HGB BLD-MCNC: 9.5 G/DL (ref 12–16)
IMM GRANULOCYTES # BLD AUTO: 0.08 K/UL (ref 0–0.04)
IMM GRANULOCYTES NFR BLD AUTO: 0.8 % (ref 0–0.5)
LYMPHOCYTES # BLD AUTO: 1.8 K/UL (ref 1–4.8)
LYMPHOCYTES NFR BLD: 18.1 % (ref 18–48)
MAGNESIUM SERPL-MCNC: 2.1 MG/DL (ref 1.6–2.6)
MCH RBC QN AUTO: 27.1 PG (ref 27–31)
MCHC RBC AUTO-ENTMCNC: 29.1 G/DL (ref 32–36)
MCV RBC AUTO: 93 FL (ref 82–98)
MONOCYTES # BLD AUTO: 0.7 K/UL (ref 0.3–1)
MONOCYTES NFR BLD: 7.4 % (ref 4–15)
NEUTROPHILS # BLD AUTO: 7 K/UL (ref 1.8–7.7)
NEUTROPHILS NFR BLD: 71.8 % (ref 38–73)
NRBC BLD-RTO: 0 /100 WBC
PHOSPHATE SERPL-MCNC: 3.6 MG/DL (ref 2.7–4.5)
PLATELET # BLD AUTO: 202 K/UL (ref 150–450)
PMV BLD AUTO: 11.6 FL (ref 9.2–12.9)
POTASSIUM SERPL-SCNC: 4.5 MMOL/L (ref 3.5–5.1)
PROT SERPL-MCNC: 5.4 G/DL (ref 6–8.4)
RBC # BLD AUTO: 3.51 M/UL (ref 4–5.4)
SODIUM SERPL-SCNC: 144 MMOL/L (ref 136–145)
TB INDURATION 48 - 72 HR READ: 0 MM
WBC # BLD AUTO: 9.77 K/UL (ref 3.9–12.7)

## 2023-11-12 PROCEDURE — 25000003 PHARM REV CODE 250: Performed by: SURGERY

## 2023-11-12 PROCEDURE — 25000242 PHARM REV CODE 250 ALT 637 W/ HCPCS: Performed by: SURGERY

## 2023-11-12 PROCEDURE — 80053 COMPREHEN METABOLIC PANEL: CPT | Performed by: SURGERY

## 2023-11-12 PROCEDURE — 99900035 HC TECH TIME PER 15 MIN (STAT)

## 2023-11-12 PROCEDURE — 85025 COMPLETE CBC W/AUTO DIFF WBC: CPT | Performed by: SURGERY

## 2023-11-12 PROCEDURE — 94760 N-INVAS EAR/PLS OXIMETRY 1: CPT

## 2023-11-12 PROCEDURE — C9113 INJ PANTOPRAZOLE SODIUM, VIA: HCPCS | Performed by: SURGERY

## 2023-11-12 PROCEDURE — 84100 ASSAY OF PHOSPHORUS: CPT | Performed by: SURGERY

## 2023-11-12 PROCEDURE — 94640 AIRWAY INHALATION TREATMENT: CPT

## 2023-11-12 PROCEDURE — 25000003 PHARM REV CODE 250: Performed by: STUDENT IN AN ORGANIZED HEALTH CARE EDUCATION/TRAINING PROGRAM

## 2023-11-12 PROCEDURE — 63600175 PHARM REV CODE 636 W HCPCS: Performed by: INTERNAL MEDICINE

## 2023-11-12 PROCEDURE — 63600175 PHARM REV CODE 636 W HCPCS: Performed by: SURGERY

## 2023-11-12 PROCEDURE — 36415 COLL VENOUS BLD VENIPUNCTURE: CPT | Performed by: SURGERY

## 2023-11-12 PROCEDURE — 94660 CPAP INITIATION&MGMT: CPT

## 2023-11-12 PROCEDURE — 12000002 HC ACUTE/MED SURGE SEMI-PRIVATE ROOM

## 2023-11-12 PROCEDURE — 94761 N-INVAS EAR/PLS OXIMETRY MLT: CPT

## 2023-11-12 PROCEDURE — 27000221 HC OXYGEN, UP TO 24 HOURS

## 2023-11-12 PROCEDURE — 25000003 PHARM REV CODE 250: Performed by: INTERNAL MEDICINE

## 2023-11-12 PROCEDURE — 83735 ASSAY OF MAGNESIUM: CPT | Performed by: SURGERY

## 2023-11-12 PROCEDURE — 99900031 HC PATIENT EDUCATION (STAT)

## 2023-11-12 PROCEDURE — C1751 CATH, INF, PER/CENT/MIDLINE: HCPCS

## 2023-11-12 RX ORDER — AMOXICILLIN AND CLAVULANATE POTASSIUM 875; 125 MG/1; MG/1
1 TABLET, FILM COATED ORAL EVERY 12 HOURS
Status: DISCONTINUED | OUTPATIENT
Start: 2023-11-12 | End: 2023-11-13

## 2023-11-12 RX ORDER — PANTOPRAZOLE SODIUM 40 MG/1
40 TABLET, DELAYED RELEASE ORAL DAILY
Status: DISCONTINUED | OUTPATIENT
Start: 2023-11-13 | End: 2023-11-12

## 2023-11-12 RX ADMIN — ATORVASTATIN CALCIUM 40 MG: 40 TABLET, FILM COATED ORAL at 08:11

## 2023-11-12 RX ADMIN — FUROSEMIDE 20 MG: 20 TABLET ORAL at 09:11

## 2023-11-12 RX ADMIN — ASPIRIN 81 MG 81 MG: 81 TABLET ORAL at 09:11

## 2023-11-12 RX ADMIN — APIXABAN 2.5 MG: 2.5 TABLET, FILM COATED ORAL at 08:11

## 2023-11-12 RX ADMIN — AMOXICILLIN AND CLAVULANATE POTASSIUM 1 TABLET: 875; 125 TABLET, FILM COATED ORAL at 08:11

## 2023-11-12 RX ADMIN — IPRATROPIUM BROMIDE AND ALBUTEROL SULFATE 3 ML: 2.5; .5 SOLUTION RESPIRATORY (INHALATION) at 01:11

## 2023-11-12 RX ADMIN — AMIODARONE HYDROCHLORIDE 200 MG: 200 TABLET ORAL at 08:11

## 2023-11-12 RX ADMIN — IPRATROPIUM BROMIDE AND ALBUTEROL SULFATE 3 ML: 2.5; .5 SOLUTION RESPIRATORY (INHALATION) at 07:11

## 2023-11-12 RX ADMIN — Medication 6 MG: at 08:11

## 2023-11-12 RX ADMIN — IPRATROPIUM BROMIDE AND ALBUTEROL SULFATE 3 ML: 2.5; .5 SOLUTION RESPIRATORY (INHALATION) at 02:11

## 2023-11-12 RX ADMIN — Medication 16 G: at 08:11

## 2023-11-12 RX ADMIN — HYDROCODONE BITARTRATE AND ACETAMINOPHEN 1 TABLET: 5; 325 TABLET ORAL at 08:11

## 2023-11-12 RX ADMIN — IPRATROPIUM BROMIDE AND ALBUTEROL SULFATE 3 ML: 2.5; .5 SOLUTION RESPIRATORY (INHALATION) at 08:11

## 2023-11-12 RX ADMIN — ENOXAPARIN SODIUM 70 MG: 80 INJECTION SUBCUTANEOUS at 09:11

## 2023-11-12 RX ADMIN — AMIODARONE HYDROCHLORIDE 200 MG: 200 TABLET ORAL at 09:11

## 2023-11-12 RX ADMIN — ALLOPURINOL 50 MG: 300 TABLET ORAL at 09:11

## 2023-11-12 RX ADMIN — CLOPIDOGREL BISULFATE 75 MG: 75 TABLET, FILM COATED ORAL at 09:11

## 2023-11-12 RX ADMIN — PANTOPRAZOLE SODIUM 40 MG: 40 INJECTION, POWDER, FOR SOLUTION INTRAVENOUS at 09:11

## 2023-11-12 RX ADMIN — PREDNISONE 20 MG: 20 TABLET ORAL at 09:11

## 2023-11-12 NOTE — PROGRESS NOTES
Progress Note  PULMONARY    Admit Date: 11/4/2023 11/12/2023  History of Present Illness:  Pt is a 77 yo female with end stage COPD, chronic resp failure on 8L home O2 who is on hospice. She called EMS and was in distress then became unresponsive. She was found to be hypercapneic intubated in ED before her hospice status was known. Her daughter is at bedside and wishes to continue vent support for now hoping she may improve.  Pt has been at home on hospice but daughter states she wants her to go to NH as she hasn't been doing well at home.    SUBJECTIVE:     11/5- continues on vent, with SAT/SBT pt wakes and follows commands but tachypneic, tachycardic and hypertensive so failed SBT. Daughter at bedside actively participating. Pt hard of hearing and has her hearing aid R ear    11/6 patient lasted 15 minutes on her sedation vacation and spontaneous breathing trial this morning.  I do not have an ABG.  That is being corrected.  The patient was on hospice.  I have spoken with her daughter who states EMS was called 1st, not hospice.  She was intubated before hospice was ever notified.  The daughter would like to give her 4-7 days on the ventilator and see if she can improve.  I told her that I am not hopeful of this.  The patient was on 8 L of oxygen at home.    11/7 the patient failed her sedation vacation.  The nurse reports 5 minutes, the Respiratory therapist reports 20.  We will repeat it.      11/8 the patient was successfully extubated yesterday.  She wore BiPAP overnight at 12/6.  Her ABG this morning was good.  Tried to talk to her about her code/intubation situation but could not get understanding from the patient.  Will try to revisit this when her daughter gets here.    11/9 the patient does not feel well this morning she feels yucky.  Her breathing is okay.  Physical therapy is here to evaluate her.    11/10 the patient has breakdown on her chin from her BiPAP mask but like sleeping on the BiPAP.  It  makes her feel better.  It is appropriate as she has hypercapnic hypoxemic respiratory failure.  She is going to have her sacral decubitus debrided today.  It is okay with me if she goes to [facility] after this.    11/11 the patient is without complaints.  She is getting used to the concept of living in a nursing home.  Her sacral decubitus was debrided yesterday.  She appears ready to transfer.    11/12 nothing new today.  She is just awaiting placement.    OBJECTIVE:     Vitals (Most recent):  Vitals:    11/12/23 1439   BP:    Pulse: 78   Resp: 19   Temp:        Vitals (24 hour range):  Temp:  [97.9 °F (36.6 °C)-98.2 °F (36.8 °C)]   Pulse:  [61-94]   Resp:  [16-19]   BP: (137-189)/(54-74)   SpO2:  [95 %-100 %]       Intake/Output Summary (Last 24 hours) at 11/12/2023 1722  Last data filed at 11/12/2023 0630  Gross per 24 hour   Intake 240 ml   Output --   Net 240 ml          PHYSICAL EXAM  GENERAL: Older patient in no distress, on 2 L nasal cannula.  HEENT: Pupils equal and reactive. Extraocular movements intact. Nose   Pharynx moist  NECK: Supple.   HEART: Regular rate and rhythm. No murmur or gallop auscultated.  LUNGS:  There are clear to auscultation.  Breath sounds are decreased in the bases, right greater than left.  Lung excursion symmetrical. No change in fremitus.   ABDOMEN: Bowel sounds present. Non-tender, no masses palpated.  : Normal anatomy.  Montes with yellow urine  EXTREMITIES: Normal muscle tone and joint movement, no cyanosis or clubbing.  The heels are looking better.  There is an eschar over the 3rd toe on the right foot.  Right midline.  LYMPHATICS: No adenopathy palpated, tr edema.  SKIN: Dry multiple abrasions and eschars, decubitus over her coccyx is dressed and debrided  NEURO:  Awake, in hopeful mood  PSYCH:  Quiet      Radiographs reviewed: view by direct vision   Chest x-ray 11/08/2023  There are faint linear opacities at the lung bases suggestive of atelectasis/scarring,  aspiration/pneumonia or trace edema in the appropriate clinical setting. There is blunting of both costophrenic angles consistent with trace pleural effusions and adjacent atelectasis. No pneumothorax is identified. The heart is top normal in size. Osseous structures appear unchanged. The visualized upper abdomen is unremarkable       TTE 8/26/23-     Left Ventricle: The left ventricle is normal in size. Moderately increased ventricular mass. Moderately increased wall thickness. There is moderate concentrict hypertrophy. Normal wall motion. There is normal systolic function.  EF 61% There is normal diastolic function.    Left Atrium: Left atrium is moderately dilated.    Right Ventricle: Normal right ventricular cavity size. Wall thickness is normal. Right ventricle wall motion  is normal. Systolic function is normal.    Mitral Valve: Mildly thickened anterior leaflet. Mild mitral annular calcification.    IVC/SVC: Normal venous pressure at 3 mmHg.    Pericardium: There is an effusion.    Limited echo; no gross pulmonary hypertension but poor visualization of tricuspid signal    No tissue Doppler performed to assess mean left atrial pressure    Labs     Recent Labs   Lab 11/12/23  0615   WBC 9.77   HGB 9.5*   HCT 32.7*        Recent Labs   Lab 11/12/23  0615      K 4.5      CO2 38*   BUN 19   CREATININE 1.5*   GLU 86   CALCIUM 8.5*   MG 2.1   PHOS 3.6   AST 15   ALT 14   ALKPHOS 57   BILITOT 0.4   PROT 5.4*   ALBUMIN 3.0*     Microbiology Results (last 7 days)       Procedure Component Value Units Date/Time    Aerobic culture [[id number]]  (Abnormal) Collected: 11/10/23 1443    Order Status: Completed Specimen: Wound from Sacrum Updated: 11/12/23 1353     Aerobic Bacterial Culture STAPHYLOCOCCUS AUREUS  Moderate  Susceptibility pending        ENTEROCOCCUS SPECIES  Moderate  Identification and susceptibility pending      Narrative:      Sacral wound- Culture    Culture, Anaerobic [[id number]]  Collected: 11/10/23 1443    Order Status: Completed Specimen: Wound from Sacrum Updated: 11/12/23 0906     Anaerobic Culture No anaerobes isolated    Narrative:      Sacral wound- Culture    Blood Culture #2 **CANNOT BE ORDERED STAT** [[id number]] Collected: 11/04/23 1552    Order Status: Completed Specimen: Blood Updated: 11/09/23 1632     Blood Culture, Routine No growth after 5 days.    Narrative:      Collection has been rescheduled by [facility] at 11/04/2023 12:28 Reason:   Unable to collect 2nd set  Collection has been rescheduled by 10 at 11/04/2023 12:28 Reason:   Unable to collect 2nd set    Blood Culture #1 **CANNOT BE ORDERED STAT** [[id number]] Collected: 11/04/23 1204    Order Status: Completed Specimen: Blood Updated: 11/09/23 1432     Blood Culture, Routine No growth after 5 days.    Urine culture [[id number]] Collected: 11/04/23 1134    Order Status: Completed Specimen: Urine from Clean Catch Updated: 11/06/23 1646     Urine Culture, Routine No growth    Culture, Respiratory with Gram Stain [[id number]]  (Abnormal)  (Susceptibility) Collected: 11/04/23 1122    Order Status: Completed Specimen: Respiratory from Sputum Updated: 11/06/23 0706     Respiratory Culture KLEBSIELLA PNEUMONIAE  Few       Gram Stain (Respiratory) <10 epithelial cells per low power field.     Gram Stain (Respiratory) Few WBC's     Gram Stain (Respiratory) Few Gram negative rods            Impression:  Active Hospital Problems    Diagnosis  POA    *Acute respiratory failure with hypoxia and hypercarbia [J96.01, J96.02]  Yes    Hypernatremia [E87.0]  Unknown    Pressure injury, unstageable [L89.95]  Yes    Atrial fibrillation [I48.91]  No    Pneumonia  [J18.9]  Yes    COPD exacerbation [J44.1]  Yes    Acute on chronic heart failure with preserved ejection fraction (HFpEF) [I50.33]  Yes    Hyperkalemia [E87.5]  Yes    Chronic kidney disease, stage 4 (severe) [N18.4]  Yes    DM type 2, controlled, with complication [E11.8]  Yes     Essential hypertension, benign [I10]  Yes      Resolved Hospital Problems    Diagnosis Date Resolved POA    Leukocytosis [D72.829] 11/09/2023 Yes           Assessment/Plan:     Acute on chronic hypercapneic/hypoxemic respiratory failure with mechanical ventilation  - on 8 L of oxygen at home  - extubated and on 2 L nasal cannula  - slept on BiPAP, will continue this nightly  Acute on chronic diastolic CHF  Bilateral pleural effusions  - on Lasix 20mg po, creatinine stable  - continuing to diurese  - right effusion continuing, left effusion is better  COPD with acute exacerbation  - resolved  - no wheezing auscultated  - continue DuoNebs to q.6  - wean prednisone again tomorrow  Right lower lobe pneumonia  - Klebsiella pneumoniae  - treated  Chest pain   - known coronary artery disease and recent stents  - she is anticoagulated and receiving Plavix and aspirin  Acute on chronic renal failure  - creatinine stable  Atrial fibrillation  - on amiodarone  - change enoxaparin to Eliquis  Coronary artery disease  - recent stents  Anemia  - chronic disease  Hyperglycemia and diabetes mellitus  Hypernatremia  - continue Lasix 20 p.o.  - resolve  Hypophosphatemia  - resolved  Troponin leak  Mild hypoalbuminemia  Decubiti to heels and sacrum  - offload pressure to heels  - sacral decubitus has been debrided  Leukocytosis  - resolved  Dysphagia  - modified diet noted    Patient was a hospice patient on admission.  Will need to talk with her daughter we have left her a DNR.  Plans for skilled nursing facility to [facility] care noted  The patient is appropriate to be back in hospice  No objection to patient going to [facility]  Continue BiPAP nightly

## 2023-11-12 NOTE — SUBJECTIVE & OBJECTIVE
Interval History: Pt's Cx from sacral area growing Staph and enterococcus-   During encounter pt was not reporting of any fever, chills, chest pain, sob, urine or bowel problem.     Stable for DC/transfer to [facility]    Review of Systems   Constitutional:  Negative for activity change, appetite change, chills and fever.   HENT:  Negative for congestion, ear pain and nosebleeds.    Eyes:  Negative for itching and visual disturbance.   Respiratory:  Negative for apnea, cough, chest tightness, shortness of breath and wheezing.    Cardiovascular:  Negative for chest pain, palpitations and leg swelling.   Gastrointestinal:  Negative for abdominal distention, abdominal pain, constipation, diarrhea, nausea and vomiting.   Genitourinary:  Negative for dysuria and flank pain.   Musculoskeletal:  Negative for back pain.   Neurological:  Negative for dizziness and headaches.     Objective:     Vital Signs (Most Recent):  Temp: 98.1 °F (36.7 °C) (11/12/23 1115)  Pulse: 78 (11/12/23 1439)  Resp: 19 (11/12/23 1439)  BP: (!) 174/67 (11/12/23 1115)  SpO2: 97 % (11/12/23 1439) Vital Signs (24h Range):  Temp:  [97.9 °F (36.6 °C)-98.2 °F (36.8 °C)] 98.1 °F (36.7 °C)  Pulse:  [61-94] 78  Resp:  [16-20] 19  SpO2:  [95 %-100 %] 97 %  BP: (137-189)/(54-74) 174/67     Weight: 77.7 kg (171 lb 4.8 oz)  Body mass index is 30.34 kg/m².    Intake/Output Summary (Last 24 hours) at 11/12/2023 9058  Last data filed at 11/12/2023 0630  Gross per 24 hour   Intake 240 ml   Output 1300 ml   Net -1060 ml         Physical Exam  Vitals reviewed.   Constitutional:       General: She is not in acute distress.     Appearance: Normal appearance. She is not ill-appearing, toxic-appearing or diaphoretic.   HENT:      Head: Normocephalic and atraumatic.      Mouth/Throat:      Mouth: Mucous membranes are moist.      Pharynx: Oropharynx is clear.   Eyes:      General: No scleral icterus.     Conjunctiva/sclera: Conjunctivae normal.   Neck:      Vascular: No carotid  bruit.   Cardiovascular:      Rate and Rhythm: Normal rate and regular rhythm.      Pulses: Normal pulses.      Heart sounds: Normal heart sounds.   Pulmonary:      Effort: Pulmonary effort is normal.      Breath sounds: Normal breath sounds.   Abdominal:      General: Abdomen is flat. Bowel sounds are normal.      Palpations: Abdomen is soft.   Musculoskeletal:         General: Normal range of motion.   Skin:     General: Skin is warm.   Neurological:      General: No focal deficit present.      Mental Status: She is alert and oriented to person, place, and time. Mental status is at baseline.   Psychiatric:         Mood and Affect: Mood normal.         Behavior: Behavior normal.             Significant Labs: All pertinent labs within the past 24 hours have been reviewed.  BMP:   Recent Labs   Lab 11/12/23 0615   GLU 86      K 4.5      CO2 38*   BUN 19   CREATININE 1.5*   CALCIUM 8.5*   MG 2.1     CBC:   Recent Labs   Lab 11/11/23 0410 11/12/23  0615   WBC 8.90 9.77   HGB 9.2* 9.5*   HCT 31.6* 32.7*    202     CMP:   Recent Labs   Lab 11/11/23 0410 11/12/23  0615   * 144   K 4.3 4.5    103   CO2 36* 38*   GLU 72 86   BUN 20 19   CREATININE 1.4 1.5*   CALCIUM 8.5* 8.5*   PROT 5.2* 5.4*   ALBUMIN 2.9* 3.0*   BILITOT 0.4 0.4   ALKPHOS 53* 57   AST 25 15   ALT 17 14   ANIONGAP 6* 3*     Magnesium:   Recent Labs   Lab 11/11/23 0410 11/12/23  0615   MG 2.1 2.1       Significant Imaging: I have reviewed all pertinent imaging results/findings within the past 24 hours.

## 2023-11-12 NOTE — PROGRESS NOTES
Pharmacist Intervention IV to PO Note    Marisol Kerr is a 78 y.o. female being treated with IV medication pantoprazole    Patient Data:    Vital Signs (Most Recent):  Temp: 97.9 °F (36.6 °C) (11/12/23 0757)  Pulse: 61 (11/12/23 0757)  Resp: 18 (11/12/23 0757)  BP: (!) 146/54 (11/12/23 0949)  SpO2: 97 % (11/12/23 0757) Vital Signs (72h Range):  Temp:  [97.3 °F (36.3 °C)-98.5 °F (36.9 °C)]   Pulse:  []   Resp:  [13-56]   BP: (111-189)/(46-74)   SpO2:  [88 %-100 %]      CBC:  Recent Labs   Lab 11/10/23  0238 11/11/23  0410 11/12/23  0615   WBC 9.62 8.90 9.77   RBC 3.06* 3.35* 3.51*   HGB 8.2* 9.2* 9.5*   HCT 27.9* 31.6* 32.7*    195 202   MCV 91 94 93   MCH 26.8* 27.5 27.1   MCHC 29.4* 29.1* 29.1*     CMP:     Recent Labs   Lab 11/10/23  0238 11/11/23  0410 11/12/23  0615    72 86   CALCIUM 8.3* 8.5* 8.5*   ALBUMIN 3.0* 2.9* 3.0*   PROT 5.4* 5.2* 5.4*    146* 144   K 3.4* 4.3 4.5   CO2 37* 36* 38*    104 103   BUN 28* 20 19   CREATININE 1.4 1.4 1.5*   ALKPHOS 61 53* 57   ALT 11 17 14   AST 16 25 15   BILITOT 0.4 0.4 0.4       Dietary Orders:  Diet Orders            Diet Dysphagia Mechanical Soft (IDDSI Level 5) [facility]; Thin: Dysphagia 2 (Mechanical Soft Ground) starting at 11/10 1652    Dietary nutrition supplements [facility]; Glucerna Therapeutic Nutrition Shake, Carlo starting at 11/09 0800            Based on the following criteria, this patient qualifies for intravenous to oral conversion:   The patients gastrointestinal tract is functioning (tolerating medications via oral or enteral route for 24 hours and tolerating food or enteral feeds for 24 hours).  The patient is hemodynamically stable for 24 hours (heart rate <100 beats per minute, systolic blood pressure >99 mm Hg, and respiratory rate <20 breaths per minute).  The patient shows clinical improvement (afebrile for at least 24 hours and white blood cell count downtrending or normalized). Additionally, the patient must be  non-neutropenic (absolute neutrophil count >500 cells/mm3).  For antimicrobials, the patient has received IV therapy for at least 24 hours.    IV medication Pantoprazole 40 mg IV daily will be changed to oral medication Pantoprazole 40 mg PO daily    Pharmacist's Name: Ashtyn Madrid  Pharmacist's Extension: [phone]

## 2023-11-12 NOTE — PROGRESS NOTES
"[facility] Medicine  Progress Note    Patient Name: Marisol Kerr  MRN: [id number]  Patient Class: IP- Inpatient   Admission Date: 11/4/2023  Length of Stay: 8 days  Attending Physician: Randolph Merritt MD  Primary Care Provider: Khadar Dwyer MD        Subjective:     Principal Problem:Acute respiratory failure with hypoxia and hypercarbia        HPI:  Marisol Kerr is a 78 y.o. White female   With PMH of HFpEF, COPD,   CAD, DM2, HTN,   who presents with SOB.     Pt currently intubated  Per EMS report, pt was found gasping for air  Said "help me, help me"  then went unresponsive  She was seated, no head trauma  Pt intubated before arrival to ER  No cardiac arrest occured     Pt was DNR, DNI + on hospice  but hospice + DNR/DNI has been revoked by her daughter      Overview/Hospital Course:  Patient admitted for acute hypercapnic respiratory failure secondary to COPD exacerbation.  Chest x-ray showed possible aspiration.  Patient was intubated before arrival to the emergency room.  Admitted to the ICU.  Pulmonology consulted.  Patient started on pressor support, given IV diuretics for concern of fluid overload.  Started on empiric IV antibiotics.  Steroids and inhalers.  Weaned off pressors.  Difficult weaning off of mechanical ventilation.  Sputum cultures were positive for Klebsiella pneumoniae.  Patient developed atrial fibrillation with rapid RVR, amiodarone drip was started.  Drip was discontinued patient was started on p.o. amiodarone.  Patient was successfully extubated on 11/07/2023.  Was placed on BiPAP at night.  Oxygenation improved.  IV steroids converted to p.o..  IV Lasix down titrated.      Interval History: Pt's Cx from sacral area growing Staph and enterococcus-   During encounter pt was not reporting of any fever, chills, chest pain, sob, urine or bowel problem.     Stable for DC/transfer to SNF    Review of Systems   Constitutional:  Negative for activity change, appetite " change, chills and fever.   HENT:  Negative for congestion, ear pain and nosebleeds.    Eyes:  Negative for itching and visual disturbance.   Respiratory:  Negative for apnea, cough, chest tightness, shortness of breath and wheezing.    Cardiovascular:  Negative for chest pain, palpitations and leg swelling.   Gastrointestinal:  Negative for abdominal distention, abdominal pain, constipation, diarrhea, nausea and vomiting.   Genitourinary:  Negative for dysuria and flank pain.   Musculoskeletal:  Negative for back pain.   Neurological:  Negative for dizziness and headaches.     Objective:     Vital Signs (Most Recent):  Temp: 98.1 °F (36.7 °C) (11/12/23 1115)  Pulse: 78 (11/12/23 1439)  Resp: 19 (11/12/23 1439)  BP: (!) 174/67 (11/12/23 1115)  SpO2: 97 % (11/12/23 1439) Vital Signs (24h Range):  Temp:  [97.9 °F (36.6 °C)-98.2 °F (36.8 °C)] 98.1 °F (36.7 °C)  Pulse:  [61-94] 78  Resp:  [16-20] 19  SpO2:  [95 %-100 %] 97 %  BP: (137-189)/(54-74) 174/67     Weight: 77.7 kg (171 lb 4.8 oz)  Body mass index is 30.34 kg/m².    Intake/Output Summary (Last 24 hours) at 11/12/2023 1459  Last data filed at 11/12/2023 0630  Gross per 24 hour   Intake 240 ml   Output 1300 ml   Net -1060 ml         Physical Exam  Vitals reviewed.   Constitutional:       General: She is not in acute distress.     Appearance: Normal appearance. She is not ill-appearing, toxic-appearing or diaphoretic.   HENT:      Head: Normocephalic and atraumatic.      Mouth/Throat:      Mouth: Mucous membranes are moist.      Pharynx: Oropharynx is clear.   Eyes:      General: No scleral icterus.     Conjunctiva/sclera: Conjunctivae normal.   Neck:      Vascular: No carotid bruit.   Cardiovascular:      Rate and Rhythm: Normal rate and regular rhythm.      Pulses: Normal pulses.      Heart sounds: Normal heart sounds.   Pulmonary:      Effort: Pulmonary effort is normal.      Breath sounds: Normal breath sounds.   Abdominal:      General: Abdomen is flat. Bowel  sounds are normal.      Palpations: Abdomen is soft.   Musculoskeletal:         General: Normal range of motion.   Skin:     General: Skin is warm.   Neurological:      General: No focal deficit present.      Mental Status: She is alert and oriented to person, place, and time. Mental status is at baseline.   Psychiatric:         Mood and Affect: Mood normal.         Behavior: Behavior normal.             Significant Labs: All pertinent labs within the past 24 hours have been reviewed.  BMP:   Recent Labs   Lab 11/12/23 0615   GLU 86      K 4.5      CO2 38*   BUN 19   CREATININE 1.5*   CALCIUM 8.5*   MG 2.1     CBC:   Recent Labs   Lab 11/11/23 0410 11/12/23 0615   WBC 8.90 9.77   HGB 9.2* 9.5*   HCT 31.6* 32.7*    202     CMP:   Recent Labs   Lab 11/11/23 0410 11/12/23 0615   * 144   K 4.3 4.5    103   CO2 36* 38*   GLU 72 86   BUN 20 19   CREATININE 1.4 1.5*   CALCIUM 8.5* 8.5*   PROT 5.2* 5.4*   ALBUMIN 2.9* 3.0*   BILITOT 0.4 0.4   ALKPHOS 53* 57   AST 25 15   ALT 17 14   ANIONGAP 6* 3*     Magnesium:   Recent Labs   Lab 11/11/23 0410 11/12/23 0615   MG 2.1 2.1       Significant Imaging: I have reviewed all pertinent imaging results/findings within the past 24 hours.      Assessment/Plan:      * Acute respiratory failure with hypoxia and hypercarbia  Looking better today  C/w breathing rx and diuretics    Ready for transfer to SNF     Pressure injury, unstageable  POD#2- pending final Cx results  Pain is better  Started Augmentin today    Hypernatremia  resolved      Atrial fibrillation  No reported history of atrial fibrillation, EKG showed atrial fibrillation with rapid ventricular response  Cardiology notified, patient started amiodarone drip.  This was discontinued, amiodarone p.o. started.  Patient on full-dose Lovenox.    Pneumonia   Sputum cultures grew Klebsiella pneumoniae   discontinue vancomycin and cefepime, Rocephin started by pulmonology        COPD  exacerbation  C/w Abx, breathing rx, steroids and oxygen as needed    Acute on chronic heart failure with preserved ejection fraction (HFpEF)  Patient is identified as having Diastolic (HFpEF) heart failure that is Acute on chronic. CHF is currently controlled. Latest ECHO performed and demonstrates- Results for orders placed during the hospital encounter of 08/25/23    Echo Saline Bubble? No    Interpretation Summary    Left Ventricle: The left ventricle is normal in size. Moderately increased ventricular mass. Moderately increased wall thickness. There is moderate concentrict hypertrophy. Normal wall motion. There is normal systolic function.  EF 61% There is normal diastolic function.    Left Atrium: Left atrium is moderately dilated.    Right Ventricle: Normal right ventricular cavity size. Wall thickness is normal. Right ventricle wall motion  is normal. Systolic function is normal.    Mitral Valve: Mildly thickened anterior leaflet. Mild mitral annular calcification.    IVC/SVC: Normal venous pressure at 3 mmHg.    Pericardium: There is an effusion.    Limited echo; no gross pulmonary hypertension but poor visualization of tricuspid signal    No tissue Doppler performed to assess mean left atrial pressure    Lasix decreased to  20 mg p.o. daily     Recent Labs   Lab 11/04/23  1057   BNP 1,995*   .    Hyperkalemia  resolved      Chronic kidney disease, stage 4 (severe)  Creatine stable for now. BMP reviewed- noted Estimated Creatinine Clearance: 29.5 mL/min (A) (based on SCr of 1.6 mg/dL (H)). according to latest data. Based on current GFR, CKD stage is stage 3 - GFR 30-59.  Monitor UOP and serial BMP and adjust therapy as needed. Renally dose meds. Avoid nephrotoxic medications and procedures.    DM type 2, controlled, with complication  Patient's FSGs are controlled on current medication regimen.  Last A1c reviewed-   Lab Results   Component Value Date    HGBA1C 5.0 08/25/2023     Most recent  "fingerstick glucose reviewed- No results for input(s): "POCTGLUCOSE" in the last 24 hours.  Current correctional scale  Low  Maintain anti-hyperglycemic dose as follows-   Antihyperglycemics (From admission, onward)    Start     Stop Route Frequency Ordered    11/04/23 1439  insulin aspart U-100 pen 0-10 Units         -- SubQ Every 6 hours PRN 11/04/23 1341        Hold Oral hypoglycemics while patient is in the hospital.    Essential hypertension, benign  Holding outpatient blood pressure medication given hypotension and pressor requirement   We will resume once blood pressure is more stable      VTE Risk Mitigation (From admission, onward)         Ordered     enoxaparin injection 70 mg  Every 12 hours         11/10/23 0718     IP VTE HIGH RISK PATIENT  Once         11/04/23 1341     Place sequential compression device  Until discontinued         11/04/23 1341                Discharge Planning   LUZ: 11/13/2023     Code Status: DNR   Is the patient medically ready for discharge?:     Reason for patient still in hospital (select all that apply): Treatment  Discharge Plan A: New Nursing Home placement - long term care facility                  Randolph Merritt MD  Department of Hospital Medicine   [facility]  "

## 2023-11-12 NOTE — RESPIRATORY THERAPY
11/11/23 2002   Patient Assessment/Suction   Level of Consciousness (AVPU) alert   Respiratory Effort Unlabored   Expansion/Accessory Muscles/Retractions no retractions;no use of accessory muscles   All Lung Fields Breath Sounds clear;diminished   PRE-TX-O2   Device (Oxygen Therapy) nasal cannula   $ Is the patient on Low Flow Oxygen? Yes   Flow (L/min) 2   SpO2 100 %   Pulse Oximetry Type Intermittent   $ Pulse Oximetry - Multiple Charge Pulse Oximetry - Multiple   Pulse 84   Resp 16   Aerosol Therapy   $ Aerosol Therapy Charges Aerosol Treatment   Daily Review of Necessity (SVN) completed   Respiratory Treatment Status (SVN) given   Treatment Route (SVN) mask;oxygen   Patient Position (SVN) semi-Solorzano's   Post Treatment Assessment (SVN) breath sounds unchanged   Signs of Intolerance (SVN) none   Breath Sounds Post-Respiratory Treatment   Throughout All Fields Post-Treatment All Fields   Throughout All Fields Post-Treatment aeration increased   Post-treatment Heart Rate (beats/min) 84   Post-treatment Resp Rate (breaths/min) 18   Education   $ Education BiPAP;Bronchodilator;15 min   Respiratory Evaluation   $ Care Plan Tech Time 15 min   $ Eval/Re-eval Charges Re-evaluation

## 2023-11-13 VITALS
HEART RATE: 99 BPM | TEMPERATURE: 98 F | WEIGHT: 171.31 LBS | SYSTOLIC BLOOD PRESSURE: 177 MMHG | HEIGHT: 63 IN | OXYGEN SATURATION: 96 % | DIASTOLIC BLOOD PRESSURE: 65 MMHG | BODY MASS INDEX: 30.35 KG/M2 | RESPIRATION RATE: 18 BRPM

## 2023-11-13 LAB
ALBUMIN SERPL BCP-MCNC: 3 G/DL (ref 3.5–5.2)
ALP SERPL-CCNC: 52 U/L (ref 55–135)
ALT SERPL W/O P-5'-P-CCNC: 13 U/L (ref 10–44)
ANION GAP SERPL CALC-SCNC: 4 MMOL/L (ref 8–16)
AST SERPL-CCNC: 15 U/L (ref 10–40)
BACTERIA SPEC AEROBE CULT: ABNORMAL
BACTERIA SPEC AEROBE CULT: ABNORMAL
BASOPHILS # BLD AUTO: 0.02 K/UL (ref 0–0.2)
BASOPHILS NFR BLD: 0.2 % (ref 0–1.9)
BILIRUB SERPL-MCNC: 0.3 MG/DL (ref 0.1–1)
BUN SERPL-MCNC: 19 MG/DL (ref 8–23)
CALCIUM SERPL-MCNC: 8.3 MG/DL (ref 8.7–10.5)
CHLORIDE SERPL-SCNC: 103 MMOL/L (ref 95–110)
CO2 SERPL-SCNC: 36 MMOL/L (ref 23–29)
CREAT SERPL-MCNC: 1.4 MG/DL (ref 0.5–1.4)
DIFFERENTIAL METHOD: ABNORMAL
EOSINOPHIL # BLD AUTO: 0.1 K/UL (ref 0–0.5)
EOSINOPHIL NFR BLD: 0.9 % (ref 0–8)
ERYTHROCYTE [DISTWIDTH] IN BLOOD BY AUTOMATED COUNT: 15.4 % (ref 11.5–14.5)
EST. GFR  (NO RACE VARIABLE): 39 ML/MIN/1.73 M^2
GLUCOSE SERPL-MCNC: 104 MG/DL (ref 70–110)
GLUCOSE SERPL-MCNC: 195 MG/DL (ref 70–110)
GLUCOSE SERPL-MCNC: 76 MG/DL (ref 70–110)
GLUCOSE SERPL-MCNC: 98 MG/DL (ref 70–110)
HCT VFR BLD AUTO: 30.5 % (ref 37–48.5)
HGB BLD-MCNC: 8.9 G/DL (ref 12–16)
IMM GRANULOCYTES # BLD AUTO: 0.08 K/UL (ref 0–0.04)
IMM GRANULOCYTES NFR BLD AUTO: 0.9 % (ref 0–0.5)
LYMPHOCYTES # BLD AUTO: 1.6 K/UL (ref 1–4.8)
LYMPHOCYTES NFR BLD: 16.9 % (ref 18–48)
MAGNESIUM SERPL-MCNC: 2 MG/DL (ref 1.6–2.6)
MCH RBC QN AUTO: 27.2 PG (ref 27–31)
MCHC RBC AUTO-ENTMCNC: 29.2 G/DL (ref 32–36)
MCV RBC AUTO: 93 FL (ref 82–98)
MONOCYTES # BLD AUTO: 0.6 K/UL (ref 0.3–1)
MONOCYTES NFR BLD: 6.7 % (ref 4–15)
NEUTROPHILS # BLD AUTO: 6.9 K/UL (ref 1.8–7.7)
NEUTROPHILS NFR BLD: 74.4 % (ref 38–73)
NRBC BLD-RTO: 0 /100 WBC
PHOSPHATE SERPL-MCNC: 3 MG/DL (ref 2.7–4.5)
PLATELET # BLD AUTO: 194 K/UL (ref 150–450)
PMV BLD AUTO: 11.7 FL (ref 9.2–12.9)
POTASSIUM SERPL-SCNC: 4.3 MMOL/L (ref 3.5–5.1)
PROT SERPL-MCNC: 5.2 G/DL (ref 6–8.4)
RBC # BLD AUTO: 3.27 M/UL (ref 4–5.4)
SODIUM SERPL-SCNC: 143 MMOL/L (ref 136–145)
WBC # BLD AUTO: 9.29 K/UL (ref 3.9–12.7)

## 2023-11-13 PROCEDURE — 84100 ASSAY OF PHOSPHORUS: CPT | Performed by: SURGERY

## 2023-11-13 PROCEDURE — 63600175 PHARM REV CODE 636 W HCPCS: Performed by: STUDENT IN AN ORGANIZED HEALTH CARE EDUCATION/TRAINING PROGRAM

## 2023-11-13 PROCEDURE — 25000003 PHARM REV CODE 250: Performed by: SURGERY

## 2023-11-13 PROCEDURE — 27000221 HC OXYGEN, UP TO 24 HOURS

## 2023-11-13 PROCEDURE — 97535 SELF CARE MNGMENT TRAINING: CPT

## 2023-11-13 PROCEDURE — 99231 SBSQ HOSP IP/OBS SF/LOW 25: CPT | Mod: ,,, | Performed by: FAMILY MEDICINE

## 2023-11-13 PROCEDURE — 94761 N-INVAS EAR/PLS OXIMETRY MLT: CPT

## 2023-11-13 PROCEDURE — 99900035 HC TECH TIME PER 15 MIN (STAT)

## 2023-11-13 PROCEDURE — 99900031 HC PATIENT EDUCATION (STAT)

## 2023-11-13 PROCEDURE — 99231 PR SUBSEQUENT HOSPITAL CARE,LEVL I: ICD-10-PCS | Mod: ,,, | Performed by: FAMILY MEDICINE

## 2023-11-13 PROCEDURE — 25000003 PHARM REV CODE 250: Performed by: STUDENT IN AN ORGANIZED HEALTH CARE EDUCATION/TRAINING PROGRAM

## 2023-11-13 PROCEDURE — 99232 PR SUBSEQUENT HOSPITAL CARE,LEVL II: ICD-10-PCS | Mod: ,,, | Performed by: INTERNAL MEDICINE

## 2023-11-13 PROCEDURE — 92507 TX SP LANG VOICE COMM INDIV: CPT

## 2023-11-13 PROCEDURE — 99232 SBSQ HOSP IP/OBS MODERATE 35: CPT | Mod: ,,, | Performed by: INTERNAL MEDICINE

## 2023-11-13 PROCEDURE — 80053 COMPREHEN METABOLIC PANEL: CPT | Performed by: SURGERY

## 2023-11-13 PROCEDURE — 85025 COMPLETE CBC W/AUTO DIFF WBC: CPT | Performed by: SURGERY

## 2023-11-13 PROCEDURE — 94660 CPAP INITIATION&MGMT: CPT

## 2023-11-13 PROCEDURE — 25000242 PHARM REV CODE 250 ALT 637 W/ HCPCS: Performed by: SURGERY

## 2023-11-13 PROCEDURE — 25000003 PHARM REV CODE 250: Performed by: INTERNAL MEDICINE

## 2023-11-13 PROCEDURE — 63600175 PHARM REV CODE 636 W HCPCS: Performed by: INTERNAL MEDICINE

## 2023-11-13 PROCEDURE — 94640 AIRWAY INHALATION TREATMENT: CPT

## 2023-11-13 PROCEDURE — 36415 COLL VENOUS BLD VENIPUNCTURE: CPT | Performed by: SURGERY

## 2023-11-13 PROCEDURE — 83735 ASSAY OF MAGNESIUM: CPT | Performed by: SURGERY

## 2023-11-13 RX ORDER — VANCOMYCIN HCL IN 5 % DEXTROSE 1G/250ML
1000 PLASTIC BAG, INJECTION (ML) INTRAVENOUS DAILY
Qty: 3500 ML | Refills: 0 | Status: SHIPPED | OUTPATIENT
Start: 2023-11-13

## 2023-11-13 RX ORDER — AMIODARONE HYDROCHLORIDE 200 MG/1
200 TABLET ORAL 2 TIMES DAILY
Qty: 60 TABLET | Refills: 3 | Status: SHIPPED | OUTPATIENT
Start: 2023-11-13 | End: 2024-11-12

## 2023-11-13 RX ORDER — PREDNISONE 20 MG/1
20 TABLET ORAL DAILY
Qty: 5 TABLET | Refills: 0 | Status: SHIPPED | OUTPATIENT
Start: 2023-11-14

## 2023-11-13 RX ORDER — VANCOMYCIN HCL IN 5 % DEXTROSE 1G/250ML
1000 PLASTIC BAG, INJECTION (ML) INTRAVENOUS
Status: DISCONTINUED | OUTPATIENT
Start: 2023-11-14 | End: 2023-11-13 | Stop reason: HOSPADM

## 2023-11-13 RX ORDER — CHOLESTYRAMINE 4 G/4.8G
1 POWDER, FOR SUSPENSION ORAL 2 TIMES DAILY
Status: DISCONTINUED | OUTPATIENT
Start: 2023-11-13 | End: 2023-11-13 | Stop reason: HOSPADM

## 2023-11-13 RX ADMIN — VANCOMYCIN HYDROCHLORIDE 1500 MG: 1.5 INJECTION, POWDER, LYOPHILIZED, FOR SOLUTION INTRAVENOUS at 12:11

## 2023-11-13 RX ADMIN — ASPIRIN 81 MG 81 MG: 81 TABLET ORAL at 08:11

## 2023-11-13 RX ADMIN — AMIODARONE HYDROCHLORIDE 200 MG: 200 TABLET ORAL at 08:11

## 2023-11-13 RX ADMIN — ALLOPURINOL 50 MG: 300 TABLET ORAL at 08:11

## 2023-11-13 RX ADMIN — FUROSEMIDE 20 MG: 20 TABLET ORAL at 08:11

## 2023-11-13 RX ADMIN — AMOXICILLIN AND CLAVULANATE POTASSIUM 1 TABLET: 875; 125 TABLET, FILM COATED ORAL at 08:11

## 2023-11-13 RX ADMIN — PREDNISONE 20 MG: 20 TABLET ORAL at 08:11

## 2023-11-13 RX ADMIN — IPRATROPIUM BROMIDE AND ALBUTEROL SULFATE 3 ML: 2.5; .5 SOLUTION RESPIRATORY (INHALATION) at 12:11

## 2023-11-13 RX ADMIN — CLOPIDOGREL BISULFATE 75 MG: 75 TABLET, FILM COATED ORAL at 08:11

## 2023-11-13 RX ADMIN — IPRATROPIUM BROMIDE AND ALBUTEROL SULFATE 3 ML: 2.5; .5 SOLUTION RESPIRATORY (INHALATION) at 07:11

## 2023-11-13 RX ADMIN — APIXABAN 2.5 MG: 2.5 TABLET, FILM COATED ORAL at 08:11

## 2023-11-13 RX ADMIN — CHOLESTYRAMINE LIGHT 4 G: 4 POWDER, FOR SUSPENSION ORAL at 12:11

## 2023-11-13 RX ADMIN — IPRATROPIUM BROMIDE AND ALBUTEROL SULFATE 3 ML: 2.5; .5 SOLUTION RESPIRATORY (INHALATION) at 01:11

## 2023-11-13 NOTE — PT/OT/SLP PROGRESS
"Occupational Therapy   Treatment    Name: Marisol Kerr  MRN: [id number]  Admitting Diagnosis:  Acute respiratory failure with hypoxia and hypercarbia  3 Days Post-Op    Recommendations:     Discharge Recommendations: Moderate Intensity Therapy  Discharge Equipment Recommendations:  none  Barriers to discharge:  Decreased caregiver support    Assessment:     Marisol Kerr is a 78 y.o. female with a medical diagnosis of Acute respiratory failure with hypoxia and hypercarbia.  Performance deficits affecting function are weakness, impaired endurance, impaired self care skills, gait instability, decreased lower extremity function, decreased safety awareness, impaired cardiopulmonary response to activity.     Rehab Prognosis:  Fair; patient would benefit from acute skilled OT services to address these deficits and reach maximum level of function.       Plan:     Patient to be seen 5 x/week to address the above listed problems via self-care/home management, therapeutic activities, therapeutic exercises  Plan of Care Expires: 12/09/23  Plan of Care Reviewed with: patient    Subjective     Chief Complaint: no new complaints  Patient/Family Comments/goals: "keep on enjoying life"  Pain/Comfort:  Pain Rating 1: 0/10  Pain Rating Post-Intervention 1: 0/10    Objective:     Communicated with: nurse prior to session.  Patient found HOB elevated with telemetry, oxygen, bed alarm, pulse ox (continuous), daniel catheter upon OT entry to room.    General Precautions: Standard, aspiration, contact, fall    Orthopedic Precautions:N/A  Braces: N/A  Respiratory Status: Nasal cannula, flow 3 L/min     Occupational Performance:     Bed Mobility:    Patient completed Rolling/Turning to Right with minimum assistance  Patient completed Scooting/Bridging with minimum assistance  Patient completed Supine to Sit with minimum assistance     Activities of Daily Living:  Grooming: stand by assistance/setup for oral care and washing face while seated at " edge of bed    Treatment & Education:  Patient was educated on importance of activity and getting out of bed, safety during ADLs and functional transfers, discharge goals and equipment needs and reviewed use of call bell for assistance.     Patient left HOB elevated with all lines intact, call button in reach, and bed alarm on    GOALS:   Multidisciplinary Problems       Occupational Therapy Goals          Problem: Occupational Therapy    Goal Priority Disciplines Outcome Interventions   Occupational Therapy Goal     OT, PT/OT     Description: Goals to be met by: 12/9/23     Patient will increase functional independence with ADLs by performing:    UE Dressing with Supervision.  LE Dressing with Supervision and Assistive Devices as needed.  Grooming while seated with Supervision.  Toileting from bedside commode with Supervision for hygiene and clothing management.   Toilet transfer to bedside commode with Supervision.                         Time Tracking:     OT Date of Treatment: 11/13/23  OT Start Time: 1023  OT Stop Time: 1040  OT Total Time (min): 17 min    Billable Minutes:Self Care/Home Management 17    OT/LONA: OT          11/13/2023

## 2023-11-13 NOTE — PT/OT/SLP PROGRESS
Physical Therapy      Patient Name:  Marisol Kerr   MRN:  [id number]    Patient not seen today secondary to Other (Comment) (Pt wanting to rest as she may be discharging today.). Will follow-up next service date if discharge falls through.

## 2023-11-13 NOTE — DISCHARGE SUMMARY
"[facility] Medicine  Discharge Summary      Patient Name: Marisol Kerr  MRN: [id number]  YURY: [id number]  Patient Class: IP- Inpatient  Admission Date: 11/4/2023  Hospital Length of Stay: 9 days  Discharge Date and Time:  11/13/2023 2:36 PM  Attending Physician: Randolph Merritt MD   Discharging Provider: Randolph Merritt MD  Primary Care Provider: Khadar Dwyer MD    Primary Care Team: Networked reference to record PCT     HPI:   Marisol Kerr is a 78 y.o. White female   With PMH of HFpEF, COPD,   CAD, DM2, HTN,   who presents with SOB.     Pt currently intubated  Per EMS report, pt was found gasping for air  Said "help me, help me"  then went unresponsive  She was seated, no head trauma  Pt intubated before arrival to ER  No cardiac arrest occured     Pt was DNR, DNI + on hospice  but hospice + DNR/DNI has been revoked by her daughter    Procedure(s) (LRB):  DEBRIDEMENT, WOUND, SACRUM (N/A)      Hospital Course:   Patient admitted for acute hypercapnic respiratory failure secondary to COPD exacerbation.  Chest x-ray showed possible aspiration.  Patient was intubated before arrival to the emergency room.  Admitted to the ICU.  Pulmonology consulted.  Patient started on pressor support, given IV diuretics for concern of fluid overload.  Started on empiric IV antibiotics.  Steroids and inhalers.  Weaned off pressors.  Difficult weaning off of mechanical ventilation.  Sputum cultures were positive for Klebsiella pneumoniae.  Patient developed atrial fibrillation with rapid RVR, amiodarone drip was started.  Drip was discontinued patient was started on p.o. amiodarone.  Patient was successfully extubated on 11/07/2023.  Was placed on BiPAP at night.  Oxygenation improved.  IV steroids converted to p.o..  IV Lasix down titrated.  Pt underwent sacral ulcer debridement- according to cultures- Pt should be on Vancomycin for 2 weeks with vanc random level daily to keep the goal at 15-20 with follow up " with wound care team.        Goals of Care Treatment Preferences:  Code Status: DNR          What is most important right now is to focus on extending life as long as possible, even it it means sacrificing quality.  Accordingly, we have decided that the best plan to meet the patient's goals includes continuing with treatment.      Consults:   Consults (From admission, onward)          Status Ordering Provider     Pharmacy to dose Vancomycin consult  Once        Provider:  (Not yet assigned)   See [facility] for full Linked Orders Report.    Acknowledged [id number], FNU     Inpatient consult to   Once        Provider:  (Not yet assigned)    Acknowledged ALISA LINDA JR     Inpatient consult to General Surgery  Once        Provider:  Alisa Linda Jr., MD    Completed RAFCLAREAT, LJ     Inpatient consult to Social Work/Case Management  Once        Provider:  (Not yet assigned)    Acknowledged ALISA LINDA JR     Inpatient consult to Registered Dietitian/Nutritionist  Once        Provider:  (Not yet assigned)    Completed BERENICE GILBERT     Inpatient consult to Cardiology  Once        Provider:  Cristian Benjamin MD    Completed KARIN SCHMIDT     Inpatient consult to Midline team  Once        Provider:  (Not yet assigned)    Completed KARIN SCHMIDT     Inpatient consult to Registered Dietitian/Nutritionist  Once        Provider:  (Not yet assigned)    Completed ALENA BARAJAS     IP consult to case management  Once        Provider:  (Not yet assigned)    Completed ALVARADO MYERS     Inpatient consult to Pulmonology  Once        Provider:  Alena Barajas MD    Completed ALVARADO MYERS     Inpatient consult to Pulmonology  Once        Provider:  Alena Barajas MD    Completed ALVARDAO MYERS            No new Assessment & Plan notes have been filed under this hospital service since the last note was generated.  Service: Hospital Medicine    Final Active  Diagnoses:    Diagnosis Date Noted POA    PRINCIPAL PROBLEM:  Acute respiratory failure with hypoxia and hypercarbia [J96.01, J96.02] 01/27/2023 Yes    Hypernatremia [E87.0] 11/09/2023 Unknown    Pressure injury, unstageable [L89.95] 11/09/2023 Yes    Atrial fibrillation [I48.91] 11/06/2023 No    Pneumonia  [J18.9] 01/27/2023 Yes    COPD exacerbation [J44.1] 01/09/2023 Yes    Acute on chronic heart failure with preserved ejection fraction (HFpEF) [I50.33] 06/30/2022 Yes    Hyperkalemia [E87.5] 02/10/2022 Yes    Chronic kidney disease, stage 4 (severe) [N18.4] 10/08/2014 Yes    DM type 2, controlled, with complication [E11.8] 06/03/2013 Yes    Essential hypertension, benign [I10] 04/09/2013 Yes      Problems Resolved During this Admission:    Diagnosis Date Noted Date Resolved POA    Leukocytosis [D72.829] 09/18/2019 11/09/2023 Yes       Discharged Condition: stable    Disposition: Another Health Care Inst*    Follow Up:    Patient Instructions:      Ambulatory referral/consult to Wound Clinic   Standing Status: Future   Referral Priority: Routine Referral Type: Consultation   Referral Reason: Specialty Services Required   Requested Specialty: Wound Care   Number of Visits Requested: 1     Diet Cardiac     Notify your health care provider if you experience any of the following:  temperature >100.4     Notify your health care provider if you experience any of the following:  persistent nausea and vomiting or diarrhea     Notify your health care provider if you experience any of the following:  severe uncontrolled pain     Notify your health care provider if you experience any of the following:  redness, tenderness, or signs of infection (pain, swelling, redness, odor or green/yellow discharge around incision site)     Notify your health care provider if you experience any of the following:  difficulty breathing or increased cough     Notify your health care provider if you experience any of the following:  severe  persistent headache     Notify your health care provider if you experience any of the following:  worsening rash     Notify your health care provider if you experience any of the following:  persistent dizziness, light-headedness, or visual disturbances     Notify your health care provider if you experience any of the following:  increased confusion or weakness     Notify your health care provider if you experience any of the following:     Activity as tolerated       Significant Diagnostic Studies: Labs: All labs within the past 24 hours have been reviewed    Pending Diagnostic Studies:       Procedure Component Value Units Date/Time    Troponin I High Sensitivity [[id number]] Collected: 11/08/23 0818    Order Status: Sent Lab Status: In process Updated: 11/08/23 0818    Specimen: Blood     Troponin I High Sensitivity [[id number]] Collected: 11/05/23 1645    Order Status: Sent Lab Status: In process Updated: 11/05/23 1647    Specimen: Blood            Medications:  Reconciled Home Medications:      Medication List        START taking these medications      amiodarone 200 MG Tab  Commonly known as: PACERONE  Take 1 tablet (200 mg total) by mouth 2 (two) times daily.     apixaban 2.5 mg Tab  Commonly known as: ELIQUIS  Take 1 tablet (2.5 mg total) by mouth 2 (two) times daily.     predniSONE 20 MG tablet  Commonly known as: DELTASONE  Take 1 tablet (20 mg total) by mouth once daily.  Start taking on: November 14, 2023            CHANGE how you take these medications      mupirocin 2 % ointment  Commonly known as: BACTROBAN  Apply topically 2 (two) times daily.  What changed: how much to take            CONTINUE taking these medications      albuterol 90 mcg/actuation inhaler  Commonly known as: VENTOLIN HFA  Inhale 2 puffs into the lungs every 6 (six) hours as needed for Wheezing or Shortness of Breath. Rescue     * albuterol-ipratropium 2.5 mg-0.5 mg/3 mL nebulizer solution  Commonly known as: DUO-NEB  Take 3 mLs by  nebulization every 4 (four) hours as needed for Wheezing or Shortness of Breath. Rescue     * CombiVENT RESPIMAT  mcg/actuation inhaler  Generic drug: ipratropium-albuteroL  Inhale 2 puffs into the lungs every 4 (four) hours as needed for Shortness of Breath. Rescue     allopurinoL 100 MG tablet  Commonly known as: ZYLOPRIM  Take 0.5 tablets (50 mg total) by mouth once daily.     ALPRAZolam 0.25 MG tablet  Commonly known as: XANAX  Take 1 tablet (0.25 mg total) by mouth every evening.     amLODIPine 10 MG tablet  Commonly known as: NORVASC  Take 1 tablet (10 mg total) by mouth once daily.     atorvastatin 40 MG tablet  Commonly known as: LIPITOR  Take 1 tablet (40 mg total) by mouth once daily.     cholestyramine-aspartame 4 gram Powd  Commonly known as: QUESTRAN/PREVALITE LIGHT  Take 4 g by mouth once daily.     clopidogreL 75 mg tablet  Commonly known as: PLAVIX  Take 1 tablet (75 mg total) by mouth once daily.     coenzyme Q10 100 mg capsule  Take 100 mg by mouth every morning.     ergocalciferol 50,000 unit Cap  Commonly known as: VITAMIN D2  Take 1 capsule (50,000 Units total) by mouth every 7 days.     ferrous sulfate 325 (65 FE) MG EC tablet  Take 325 mg by mouth once daily.     fish oil-omega-3 fatty acids 300-1,000 mg capsule  Take 2 capsules by mouth every morning.     furosemide 20 MG tablet  Commonly known as: LASIX  Take 1 tablet (20 mg total) by mouth every other day. TAKE WITH POTASSIUM     gabapentin 300 MG capsule  Commonly known as: NEURONTIN  Take 300 mg by mouth Daily.     INCRUSE ELLIPTA 62.5 mcg/actuation inhalation capsule  Generic drug: umeclidinium  Inhale 62.5 mcg into the lungs every morning. Controller     ketoconazole 2 % cream  Commonly known as: NIZORAL  Apply 1 application  topically 2 (two) times daily.     magnesium oxide 400 mg (241.3 mg magnesium) tablet  Commonly known as: MAG-OX  Take 1 tablet by mouth 2 (two) times daily.     metoprolol succinate 200 MG 24 hr  tablet  Commonly known as: TOPROL-XL  Take 1 tablet (200 mg total) by mouth once daily.     nitroGLYCERIN 0.4 MG SL tablet  Commonly known as: NITROSTAT  Place 1 tablet (0.4 mg total) under the tongue every 5 (five) minutes as needed for Chest pain. Up to 3 doses. If chest pain is not relieved or worsens 3 to 5 minutes after 1 dose, call 911 and seek immediate emergency medical attention.     pantoprazole 40 MG tablet  Commonly known as: PROTONIX  Take 1 tablet (40 mg total) by mouth once daily.     potassium chloride 10 MEQ Cpsr  Commonly known as: MICRO-K  Take 1 capsule (10 mEq total) by mouth every other day. (TAKE WITH LASIX/FUROSEMIDE)     PRESERVISION AREDS-2 ORAL  Take 1 tablet by mouth once daily.     triamcinolone acetonide 0.1% 0.1 % cream  Commonly known as: KENALOG  Apply 1 g topically 2 (two) times daily.     VITAMIN C 500 MG tablet  Generic drug: ascorbic acid (vitamin C)  Take 500 mg by mouth 2 (two) times daily.           * This list has 2 medication(s) that are the same as other medications prescribed for you. Read the directions carefully, and ask your doctor or other care provider to review them with you.                STOP taking these medications      aspirin 81 MG Chew     dexAMETHasone 4 MG Tab  Commonly known as: DECADRON              Indwelling Lines/Drains at time of discharge:   Lines/Drains/Airways       Central Venous Catheter Line  Duration                  PowerPort A Cath Single Lumen Atrial Right -- days                    Time spent on the discharge of patient: 35 minutes         Randolph Merritt MD  Department of Hospital Medicine  [facility]

## 2023-11-13 NOTE — CARE UPDATE
11/13/23 0749   Patient Assessment/Suction   Level of Consciousness (AVPU) alert   Respiratory Effort Normal;Unlabored   All Lung Fields Breath Sounds clear   Rhythm/Pattern, Respiratory unlabored;pattern regular;depth regular   Cough Frequency no cough   PRE-TX-O2   Device (Oxygen Therapy) BIPAP   Oxygen Concentration (%) 40   SpO2 97 %   Pulse Oximetry Type Intermittent   $ Pulse Oximetry - Multiple Charge Pulse Oximetry - Multiple   Pulse 65   Resp 19   Aerosol Therapy   $ Aerosol Therapy Charges Aerosol Treatment   Daily Review of Necessity (SVN) completed   Respiratory Treatment Status (SVN) given   Treatment Route (SVN) in-line   Patient Position (SVN) HOB elevated   Post Treatment Assessment (SVN) increased aeration   Signs of Intolerance (SVN) none   Breath Sounds Post-Respiratory Treatment   Throughout All Fields Post-Treatment All Fields   Throughout All Fields Post-Treatment aeration increased   Post-treatment Heart Rate (beats/min) 86   Post-treatment Resp Rate (breaths/min) 17   Ready to Wean/Extubation Screen   FIO2<=50 (chart decimal) 0.4   Preset CPAP/BiPAP Settings   Mode Of Delivery BiPAP   $ CPAP/BiPAP Daily Charge BiPAP/CPAP Daily   $ Is patient using? Yes   Size of Mask Medium/Large   Sized Appropriately? Yes   Equipment Type V60   Airway Device Type medium full face mask   Ipap 12   EPAP (cm H2O) 6   Pressure Support (cm H2O) 6   Set Rate (Breaths/Min) 12   ITime (sec) 1   Rise Time (sec) 3   CPAP/BiPAP Alarms   High Pressure (cm H2O) 30   Low Pressure (cm H2O) 10   Minute Ventilation (L/Min) 3   High RR (breaths/min) 30   Low RR (breaths/min) 10   Apnea (Sec) 20

## 2023-11-13 NOTE — NURSING
Attempted to call report to [facility], was told the nurse is in report.  Left a call back number for nurse to call back.

## 2023-11-13 NOTE — PROGRESS NOTES
VANCOMYCIN PHARMACOKINETIC NOTE:  Vancomycin Day # 1    Objective/Assessment:    Diagnosis/Indication for Vancomycin: Skin & Soft Tissue infection     78 y.o., female; Actual Body Weight = 77.7 kg (171 lb 4.8 oz).    The patient has the following labs:  11/13/2023 Estimated Creatinine Clearance: 33.7 mL/min (based on SCr of 1.4 mg/dL). Lab Results   Component Value Date    BUN 19 11/13/2023     Lab Results   Component Value Date    WBC 9.29 11/13/2023        Plan:  Adjust vancomycin dose and/or frequency based on the patient's actual weight and renal function:  Initiate Vancomycin 1500 mg IV once followed by 1000 mg every 24 hours.  Orders have been entered into patient's chart.    Vancomycin dose = 13.0 mg/kg actual body weight    Vancomycin trough level has been ordered for 11/14/23 @ 1200.    Pharmacy will manage vancomycin therapy, monitor serum vancomycin levels, monitor renal function and adjust regimen as necessary.      Thank you for allowing us to participate in this patient's care.     Ashtyn Madrid 11/13/2023 12:25 PM  Department of Pharmacy  Ext [phone]

## 2023-11-13 NOTE — CARE UPDATE
11/12/23 2012   Patient Assessment/Suction   Level of Consciousness (AVPU) alert   Respiratory Effort Unlabored;Normal   Expansion/Accessory Muscles/Retractions no use of accessory muscles;no retractions   All Lung Fields Breath Sounds clear;equal bilaterally   Rhythm/Pattern, Respiratory unlabored   Cough Type good;dry;nonproductive   PRE-TX-O2   Device (Oxygen Therapy) nasal cannula   $ Is the patient on Low Flow Oxygen? Yes   Flow (L/min) 3   SpO2 99 %   Pulse Oximetry Type Intermittent   $ Pulse Oximetry - Single Charge Pulse Oximetry - Single   Aerosol Therapy   $ Aerosol Therapy Charges Aerosol Treatment   Daily Review of Necessity (SVN) completed   Respiratory Treatment Status (SVN) given   Treatment Route (SVN) mask;oxygen   Patient Position (SVN) HOB elevated   Post Treatment Assessment (SVN) increased aeration   Signs of Intolerance (SVN) none   Aerosol Therapy (SVN) Infant   Post Treatment Assessment (SVN) increased aeration   Signs of Intolerance (SVN) none   Education   $ Education Bronchodilator;15 min

## 2023-11-13 NOTE — PLAN OF CARE
Per Santa with [facility], report can be called @ 6:00PM. She states she will call primary nurse to give report information and she is setting up [facility] for transport. Primary nurse updated.      11/13/23 1531   Post-Acute Status   Post-Acute Authorization Placement   Post-Acute Placement Status Set-up Complete/Auth obtained

## 2023-11-13 NOTE — PLAN OF CARE
Pt clear for DC from CM standpoint. Discharging to [facility]    Sent rx for vanc to [facility] via [facility]     11/13/23 6820   Final Note   Assessment Type Final Discharge Note   Anticipated Discharge Disposition Long Term

## 2023-11-13 NOTE — CONSULTS
7x4.5x1.6cm gray fibrous tissue in wound bed, cleaned and dried and applied aquacel ag.  Periwound redness and induration extends down the left buttock about 5 cm.  Will reach out to Dr. Barkley for further orders.   2nd toe dry blood, tip of great toe red, left heel red and dry flakey skin, stage 1, left lower leg red with dry scabs.  Applied thin layer of Triad to heel right lower leg and toe Mepilex to heel. Heel protectors on and waffle in place, patient instructed to turn side to side.

## 2023-11-13 NOTE — PROGRESS NOTES
Progress Note  PULMONARY    Admit Date: 11/4/2023 11/13/2023  History of Present Illness:  Pt is a 77 yo female with end stage COPD, chronic resp failure on 8L home O2 who is on hospice. She called EMS and was in distress then became unresponsive. She was found to be hypercapneic intubated in ED before her hospice status was known. Her daughter is at bedside and wishes to continue vent support for now hoping she may improve.  Pt has been at home on hospice but daughter states she wants her to go to NH as she hasn't been doing well at home.    SUBJECTIVE:     11/5- continues on vent, with SAT/SBT pt wakes and follows commands but tachypneic, tachycardic and hypertensive so failed SBT. Daughter at bedside actively participating. Pt hard of hearing and has her hearing aid R ear    11/6 patient lasted 15 minutes on her sedation vacation and spontaneous breathing trial this morning.  I do not have an ABG.  That is being corrected.  The patient was on hospice.  I have spoken with her daughter who states EMS was called 1st, not hospice.  She was intubated before hospice was ever notified.  The daughter would like to give her 4-7 days on the ventilator and see if she can improve.  I told her that I am not hopeful of this.  The patient was on 8 L of oxygen at home.    11/7 the patient failed her sedation vacation.  The nurse reports 5 minutes, the Respiratory therapist reports 20.  We will repeat it.      11/8 the patient was successfully extubated yesterday.  She wore BiPAP overnight at 12/6.  Her ABG this morning was good.  Tried to talk to her about her code/intubation situation but could not get understanding from the patient.  Will try to revisit this when her daughter gets here.    11/9 the patient does not feel well this morning she feels yucky.  Her breathing is okay.  Physical therapy is here to evaluate her.    11/10 the patient has breakdown on her chin from her BiPAP mask but like sleeping on the BiPAP.  It  makes her feel better.  It is appropriate as she has hypercapnic hypoxemic respiratory failure.  She is going to have her sacral decubitus debrided today.  It is okay with me if she goes to [facility] after this.    11/11 the patient is without complaints.  She is getting used to the concept of living in a nursing home.  Her sacral decubitus was debrided yesterday.  She appears ready to transfer.    11/12 nothing new today.  She is just awaiting placement.    11/13 the patient's breathing is stable.  Her sacral wound is deep and could probably stand to be debrided again.  I spoke with Dr. Barkley who feels she really belongs in an LTAC for aggressive wound management if this is going to heal.  I have spoken to Dr. Merritt about it as well.    OBJECTIVE:     Vitals (Most recent):  Vitals:    11/13/23 0749   BP:    Pulse: 65   Resp: 19   Temp:        Vitals (24 hour range):  Temp:  [97.5 °F (36.4 °C)-98.2 °F (36.8 °C)]   Pulse:  []   Resp:  [14-24]   BP: (131-182)/(54-78)   SpO2:  [93 %-100 %]       Intake/Output Summary (Last 24 hours) at 11/13/2023 1033  Last data filed at 11/13/2023 0952  Gross per 24 hour   Intake 360 ml   Output 50 ml   Net 310 ml          PHYSICAL EXAM  GENERAL: Older patient in no distress, on 2 L nasal cannula.  HEENT: Pupils equal and reactive. Extraocular movements intact. Nose   Pharynx moist  NECK: Supple.   HEART: Regular rate and rhythm. No murmur or gallop auscultated.  LUNGS:  There are clear to auscultation.  Breath sounds are decreased in the bases, right greater than left.  Lung excursion symmetrical. No change in fremitus.   ABDOMEN: Bowel sounds present. Non-tender, no masses palpated.  : Normal anatomy.  Montes with yellow urine  EXTREMITIES: Normal muscle tone and joint movement, no cyanosis or clubbing.  The heels are looking better.  There is an eschar over the 3rd toe on the right foot.  Right midline.  LYMPHATICS: No adenopathy palpated, tr edema.  SKIN: Dry multiple  abrasions and eschars, decubitus over her coccyx is dressed and debrided  NEURO:  Awake, in hopeful mood  PSYCH:  Quiet      Radiographs reviewed: view by direct vision   Chest x-ray 11/08/2023  There are faint linear opacities at the lung bases suggestive of atelectasis/scarring, aspiration/pneumonia or trace edema in the appropriate clinical setting. There is blunting of both costophrenic angles consistent with trace pleural effusions and adjacent atelectasis. No pneumothorax is identified. The heart is top normal in size. Osseous structures appear unchanged. The visualized upper abdomen is unremarkable       TTE 8/26/23-     Left Ventricle: The left ventricle is normal in size. Moderately increased ventricular mass. Moderately increased wall thickness. There is moderate concentrict hypertrophy. Normal wall motion. There is normal systolic function.  EF 61% There is normal diastolic function.    Left Atrium: Left atrium is moderately dilated.    Right Ventricle: Normal right ventricular cavity size. Wall thickness is normal. Right ventricle wall motion  is normal. Systolic function is normal.    Mitral Valve: Mildly thickened anterior leaflet. Mild mitral annular calcification.    IVC/SVC: Normal venous pressure at 3 mmHg.    Pericardium: There is an effusion.    Limited echo; no gross pulmonary hypertension but poor visualization of tricuspid signal    No tissue Doppler performed to assess mean left atrial pressure    Labs     Recent Labs   Lab 11/13/23  0422   WBC 9.29   HGB 8.9*   HCT 30.5*        Recent Labs   Lab 11/13/23 0422      K 4.3      CO2 36*   BUN 19   CREATININE 1.4   GLU 98   CALCIUM 8.3*   MG 2.0   PHOS 3.0   AST 15   ALT 13   ALKPHOS 52*   BILITOT 0.3   PROT 5.2*   ALBUMIN 3.0*     Microbiology Results (last 7 days)       Procedure Component Value Units Date/Time    Clostridium difficile EIA [[id number]]     Order Status: No result Specimen: Stool     Aerobic culture  [[id number]]  (Abnormal)  (Susceptibility) Collected: 11/10/23 1443    Order Status: Completed Specimen: Wound from Sacrum Updated: 11/13/23 0654     Aerobic Bacterial Culture METHICILLIN RESISTANT STAPHYLOCOCCUS AUREUS  Moderate  Known MRSA patient        ENTEROCOCCUS FAECALIS  Moderate      Narrative:      Sacral wound- Culture    Culture, Anaerobic [[id number]] Collected: 11/10/23 1443    Order Status: Completed Specimen: Wound from Sacrum Updated: 11/12/23 0906     Anaerobic Culture No anaerobes isolated    Narrative:      Sacral wound- Culture    Blood Culture #2 **CANNOT BE ORDERED STAT** [[id number]] Collected: 11/04/23 1552    Order Status: Completed Specimen: Blood Updated: 11/09/23 1632     Blood Culture, Routine No growth after 5 days.    Narrative:      Collection has been rescheduled by [facility] at 11/04/2023 12:28 Reason:   Unable to collect 2nd set  Collection has been rescheduled by [facility] at 11/04/2023 12:28 Reason:   Unable to collect 2nd set    Blood Culture #1 **CANNOT BE ORDERED STAT** [[id number]] Collected: 11/04/23 1204    Order Status: Completed Specimen: Blood Updated: 11/09/23 1432     Blood Culture, Routine No growth after 5 days.    Urine culture [[id number]] Collected: 11/04/23 1134    Order Status: Completed Specimen: Urine from Clean Catch Updated: 11/06/23 1646     Urine Culture, Routine No growth            Impression:  Active Hospital Problems    Diagnosis  POA    *Acute respiratory failure with hypoxia and hypercarbia [J96.01, J96.02]  Yes    Hypernatremia [E87.0]  Unknown    Pressure injury, unstageable [L89.95]  Yes    Atrial fibrillation [I48.91]  No    Pneumonia  [J18.9]  Yes    COPD exacerbation [J44.1]  Yes    Acute on chronic heart failure with preserved ejection fraction (HFpEF) [I50.33]  Yes    Hyperkalemia [E87.5]  Yes    Chronic kidney disease, stage 4 (severe) [N18.4]  Yes    DM type 2, controlled, with complication [E11.8]  Yes    Essential hypertension, benign [I10]  Yes       Resolved Hospital Problems    Diagnosis Date Resolved POA    Leukocytosis [D72.829] 11/09/2023 Yes           Assessment/Plan:     Acute on chronic hypercapneic/hypoxemic respiratory failure with mechanical ventilation  - on 8 L of oxygen at home  - extubated and on 2 L nasal cannula  - slept on BiPAP, will continue this nightly  Acute on chronic diastolic CHF  Bilateral pleural effusions  - on Lasix 20mg po, creatinine stable  - continuing to diurese  - right effusion continuing, left effusion is better  COPD with acute exacerbation  - resolved  - no wheezing auscultated  - continue DuoNebs to q.6  - wean prednisone again tomorrow  Right lower lobe pneumonia  - Klebsiella pneumoniae  - treated  Chest pain   - known coronary artery disease and recent stents  - she is anticoagulated and receiving Plavix and aspirin  Acute on chronic renal failure  - creatinine stable  Atrial fibrillation  - on amiodarone  - change enoxaparin to Eliquis  Coronary artery disease  - recent stents  Anemia  - chronic disease  Hyperglycemia and diabetes mellitus  Hypernatremia  - continue Lasix 20 p.o.  - resolve  Hypophosphatemia  - resolved  Troponin leak  Mild hypoalbuminemia  Decubiti to heels and sacrum  - offload pressure to heels  - sacral decubitus has been debrided  Leukocytosis  - resolved  Dysphagia  - modified diet noted    Patient was a hospice patient on admission.    Plans for skilled nursing facility to [facility] care noted but patient would be better served in an LTAC  The patient is appropriate to be back in hospice  Continue BiPAP nightly and oxygen and bronchodilators during the day

## 2023-11-13 NOTE — PLAN OF CARE
Spoke with pt's daughter, Marisol [phone], regarding LTAC before SNF or longterm placement. Explained the reason for LTAC and the process of moving to SNF or longterm after, Marisol verbalized understanding and is ok with this plan.     Spoke with [facility] who states they can accept pt in LTAC whenever she is discharged. MD updated     11/13/23 3099   Post-Acute Status   Post-Acute Authorization Placement   Post-Acute Placement Status Pending post-acute provider review/more information requested

## 2023-11-13 NOTE — PT/OT/SLP PROGRESS
"Speech Language Pathology Treatment    Patient Name:  Marisol Kerr   MRN:  [id number]  Admitting Diagnosis: Acute respiratory failure with hypoxia and hypercarbia    Recommendations:                 General Recommendations:  Dysphagia therapy  Diet recommendations:  Minced & Moist Diet - IDDSI Level 5, Liquid Diet Level: Thin liquids - IDDSI Level 0   Aspiration Precautions:  Effortful swallow/bolus prep set, 1 bite/sip at a time, Eliminate distractions, HOB to 90 degrees, Meds crushed in puree, Small bites/sips, and Wear oxygen during intake    General Precautions: Standard, aspiration  Communication strategies:   speak on R side    Assessment:     Marisol Kerr is a 78 y.o. female with an SLP diagnosis of  resolved s/s dysphagia and improved swallow/breathe patterns .  She presents with improved tolerance of complex textures, pharyngeal swallow initiation timeliness, and improved laryngeal movement noted via palpation. Rec pt upgrade to regular textures w/ thin liquids. ST to f/u re tolerance x1 more visit.    Subjective     Pt awake laying in bed. Agreeable to ST.  Patient goals: "I have my teeth now, so I think I can rouse better w/ swallowing"     Pain/Comfort:       Respiratory Status: Nasal cannula, flow 3 L/min    Objective:     Has the patient been evaluated by SLP for swallowing?   Yes  Keep patient NPO? No   Current Respiratory Status:        Pt agreed to PO trials of regular, mixed, and thin consistencies. Pt required denture cleaning prior to PO trials; SLP provided denture care prior to PO trials. Pt was able to recall and implement all swallowing precautions IND. She tolerated bites of cracker, diced peaches in thin juice, and water via straw. No overt s/s aspiration, timely mastication, adequate oral clearance, timely oral transport, timely pharyngeal swallow initiation, and improved laryngeal elevation/excursion. Discussion of POC w/ pt; pt expressing understanding of plan.    Goals:   Multidisciplinary " Problems       SLP Goals          Problem: SLP    Goal Priority Disciplines Outcome   SLP Goal     SLP Ongoing, Progressing   Description: 1. Pt will tolerate IDDSI 5, 0 diet w/o overt s/s aspiration and w/ timely pharyngeal swallow initiation noted via palpation during >95% of PO intake -MET  2. Pt and family will participate in dysphagia education  3. Pt will recall and demonstrate use of swallowing precautions during >95% of  PO intake -MET  4. Pt will tolerate regular textures w/ thin liquids w/o overt s/s aspiration during >95% of PO intake                       Plan:     Patient to be seen:  2 x/week   Plan of Care expires:  11/25/23  Plan of Care reviewed with:  patient, other (see comments) (nursing)   SLP Follow-Up:  Yes       Discharge recommendations:  No Therapy Indicated   Barriers to Discharge:  None    Time Tracking:     SLP Treatment Date:   11/13/23  Speech Start Time:  1130  Speech Stop Time:  1201     Speech Total Time (min):  31 min    Billable Minutes: Treatment Swallowing Dysfunction 15 min and Self Care/Home Management Training 16 min    11/13/2023

## 2023-11-13 NOTE — PLAN OF CARE
Problem: SLP  Goal: SLP Goal  Description: 1. Pt will tolerate IDDSI 5, 0 diet w/o overt s/s aspiration and w/ timely pharyngeal swallow initiation noted via palpation during >95% of PO intake -MET  2. Pt and family will participate in dysphagia education  3. Pt will recall and demonstrate use of swallowing precautions during >95% of  PO intake -MET  4. Pt will tolerate regular textures w/ thin liquids w/o overt s/s aspiration during >95% of PO intake  Outcome: Ongoing, Progressing     Pt upgraded to regular, thin today. Will follow for 1 more visit.

## 2023-11-13 NOTE — DISCHARGE INSTRUCTIONS
[facility]  Facility Transfer Orders        Admit to: [facility]    Diagnoses:   Active Hospital Problems    Diagnosis  POA    *Acute respiratory failure with hypoxia and hypercarbia [J96.01, J96.02]  Yes    Hypernatremia [E87.0]  Unknown    Pressure injury, unstageable [L89.95]  Yes    Atrial fibrillation [I48.91]  No    Pneumonia  [J18.9]  Yes    COPD exacerbation [J44.1]  Yes    Acute on chronic heart failure with preserved ejection fraction (HFpEF) [I50.33]  Yes    Hyperkalemia [E87.5]  Yes    Chronic kidney disease, stage 4 (severe) [N18.4]  Yes    DM type 2, controlled, with complication [E11.8]  Yes    Essential hypertension, benign [I10]  Yes      Resolved Hospital Problems    Diagnosis Date Resolved POA    Leukocytosis [D72.829] 11/09/2023 Yes     Allergies:   Review of patient's allergies indicates:   Allergen Reactions    Iodinated contrast media     Strawberries [strawberry] Hives and Itching       Code Status: DNR    Vitals: Routine       Diet: diabetic diet: 1800 calorie    Activity: Activity as tolerated    Nursing Precautions: Fall, pressure ulcer    Bed/Surface: Low Air Loss    Consults: PT to evaluate and treat- 5 times a week, OT to evaluate and treat- 5 times a week, and Wound Care    Oxygen: 3 liters/min via nasal cannula    Dialysis: Patient is not on dialysis.     Pending Diagnostic Studies:       Procedure Component Value Units Date/Time    Troponin I High Sensitivity [[id number]] Collected: 11/08/23 0818    Order Status: Sent Lab Status: In process Updated: 11/08/23 0818    Specimen: Blood     Troponin I High Sensitivity [[id number]] Collected: 11/05/23 1645    Order Status: Sent Lab Status: In process Updated: 11/05/23 1647    Specimen: Blood             Miscellaneous Care:   Wound Care: yes:  Pressure Ulcer: Location: sacrum      IV Access: Mid-line     Medications: Discontinue all previous medication orders, if any. See new list below.  Current Discharge Medication List         START taking these medications    Details   amiodarone (PACERONE) 200 MG Tab Take 1 tablet (200 mg total) by mouth 2 (two) times daily.  Qty: 60 tablet, Refills: 3      apixaban (ELIQUIS) 2.5 mg Tab Take 1 tablet (2.5 mg total) by mouth 2 (two) times daily.  Qty: 60 tablet, Refills: 3      predniSONE (DELTASONE) 20 MG tablet Take 1 tablet (20 mg total) by mouth once daily.  Qty: 5 tablet, Refills: 0           CONTINUE these medications which have NOT CHANGED    Details   albuterol (VENTOLIN HFA) 90 mcg/actuation inhaler Inhale 2 puffs into the lungs every 6 (six) hours as needed for Wheezing or Shortness of Breath. Rescue  Qty: 36 g, Refills: 3    Associated Diagnoses: Panlobular emphysema      albuterol-ipratropium (DUO-NEB) 2.5 mg-0.5 mg/3 mL nebulizer solution Take 3 mLs by nebulization every 4 (four) hours as needed for Wheezing or Shortness of Breath. Rescue  Qty: 120 each, Refills: 6    Associated Diagnoses: Chronic hypoxemic respiratory failure      allopurinoL (ZYLOPRIM) 100 MG tablet Take 0.5 tablets (50 mg total) by mouth once daily.  Qty: 45 tablet, Refills: 1    Associated Diagnoses: Gout, unspecified cause, unspecified chronicity, unspecified site      ALPRAZolam (XANAX) 0.25 MG tablet Take 1 tablet (0.25 mg total) by mouth every evening.  Qty: 90 tablet, Refills: 1    Associated Diagnoses: Anxiety      amLODIPine (NORVASC) 10 MG tablet Take 1 tablet (10 mg total) by mouth once daily.  Qty: 30 tablet, Refills: 11    Comments: .      atorvastatin (LIPITOR) 40 MG tablet Take 1 tablet (40 mg total) by mouth once daily.  Qty: 90 tablet, Refills: 3      cholestyramine-aspartame (QUESTRAN/PREVALITE LIGHT) 4 gram Powd Take 4 g by mouth once daily.      clopidogreL (PLAVIX) 75 mg tablet Take 1 tablet (75 mg total) by mouth once daily.  Qty: 30 tablet, Refills: 11      coenzyme Q10 100 mg capsule Take 100 mg by mouth every morning.      ergocalciferol (VITAMIN D2) 50,000 unit Cap Take 1 capsule (50,000 Units  total) by mouth every 7 days.  Qty: 12 capsule, Refills: 1    Associated Diagnoses: Vitamin D deficiency      ferrous sulfate 325 (65 FE) MG EC tablet Take 325 mg by mouth once daily.      fish oil-omega-3 fatty acids 300-1,000 mg capsule Take 2 capsules by mouth every morning.      furosemide (LASIX) 20 MG tablet Take 1 tablet (20 mg total) by mouth every other day. TAKE WITH POTASSIUM  Qty: 30 tablet, Refills: 4    Associated Diagnoses: Coronary artery disease of native artery of native heart with stable angina pectoris      gabapentin (NEURONTIN) 300 MG capsule Take 300 mg by mouth Daily.      ipratropium-albuteroL (COMBIVENT RESPIMAT)  mcg/actuation inhaler Inhale 2 puffs into the lungs every 4 (four) hours as needed for Shortness of Breath. Rescue  Qty: 12 g, Refills: 3    Comments: Please consider 90 day supplies to promote better adherence  Associated Diagnoses: Chronic hypoxemic respiratory failure      ketoconazole (NIZORAL) 2 % cream Apply 1 application  topically 2 (two) times daily.      magnesium oxide (MAG-OX) 400 mg (241.3 mg magnesium) tablet Take 1 tablet by mouth 2 (two) times daily.      metoprolol succinate (TOPROL-XL) 200 MG 24 hr tablet Take 1 tablet (200 mg total) by mouth once daily.  Qty: 30 tablet, Refills: 5    Comments: .      mupirocin (BACTROBAN) 2 % ointment Apply topically 2 (two) times daily.  Qty: 30 g, Refills: 3    Associated Diagnoses: Sacral decubitus ulcer, stage II      nitroGLYCERIN (NITROSTAT) 0.4 MG SL tablet Place 1 tablet (0.4 mg total) under the tongue every 5 (five) minutes as needed for Chest pain. Up to 3 doses. If chest pain is not relieved or worsens 3 to 5 minutes after 1 dose, call 911 and seek immediate emergency medical attention.  Qty: 25 tablet, Refills: 2      pantoprazole (PROTONIX) 40 MG tablet Take 1 tablet (40 mg total) by mouth once daily.  Qty: 90 tablet, Refills: 3    Associated Diagnoses: Gastroesophageal reflux disease without esophagitis       potassium chloride (MICRO-K) 10 MEQ CpSR Take 1 capsule (10 mEq total) by mouth every other day. (TAKE WITH LASIX/FUROSEMIDE)  Qty: 15 capsule, Refills: 0      triamcinolone acetonide 0.1% (KENALOG) 0.1 % cream Apply 1 g topically 2 (two) times daily.      umeclidinium (INCRUSE ELLIPTA) 62.5 mcg/actuation inhalation capsule Inhale 62.5 mcg into the lungs every morning. Controller  Qty: 90 each, Refills: 3    Associated Diagnoses: Chronic hypoxemic respiratory failure      vit C/E/Zn/coppr/lutein/zeaxan (PRESERVISION AREDS-2 ORAL) Take 1 tablet by mouth once daily.      VITAMIN C 500 MG tablet Take 500 mg by mouth 2 (two) times daily.           STOP taking these medications       aspirin 81 MG Chew Comments:   Reason for Stopping:         dexAMETHasone (DECADRON) 4 MG Tab Comments:   Reason for Stopping:             Follow up:       Immunizations Administered as of 11/13/2023       Name Date Dose VIS Date Route Exp Date    COVID-19, MRNA, LN-S, PF (Moderna) 10/28/2021 -- -- Intramuscular --    Site: Left arm     Lot: [id number]     COVID-19, MRNA, LN-S, PF (Moderna) 2/10/2021 -- -- Intramuscular --    Site: Left arm     Lot: [id number]     COVID-19, MRNA, LN-S, PF (Moderna) 1/13/2021 -- -- Intramuscular --    Site: Left arm     Lot: [id number]             This patient has had both covid vaccinations    Some patients may experience side effects after vaccination.  These may include fever, headache, muscle or joint aches.  Most symptoms resolve with 24-48 hours and do not require urgent medical evaluation unless they persist for more than 72 hours or symptoms are concerning for an unrelated medical condition.

## 2023-11-14 NOTE — NURSING
Marjan arrived to  patient.  Midline staying in place, saline locked.  Discharge instructions provided with Marjan.  Pt transferred to stretcher in stable condition.  Pt left unit in stable condition via stretcher and Marjan.  Personal belongings sent with Marjan.

## 2023-11-14 NOTE — PLAN OF CARE
Problem: Infection  Goal: Absence of Infection Signs and Symptoms  11/13/2023 1832 by Marilia Yuan LPN  Outcome: Met  11/13/2023 1500 by Marilia Yuan LPN  Outcome: Ongoing, Progressing     Problem: Adult Inpatient Plan of Care  Goal: Plan of Care Review  11/13/2023 1832 by Marilia Yuan LPN  Outcome: Met  11/13/2023 1500 by Marilia Yuan LPN  Outcome: Ongoing, Progressing  Goal: Patient-Specific Goal (Individualized)  11/13/2023 1832 by Marilia Yuan LPN  Outcome: Met  11/13/2023 1500 by Marilia Yuan LPN  Outcome: Ongoing, Progressing  Goal: Absence of Hospital-Acquired Illness or Injury  11/13/2023 1832 by Marilia Yuan LPN  Outcome: Met  11/13/2023 1500 by Marilia Yuan LPN  Outcome: Ongoing, Progressing  Goal: Optimal Comfort and Wellbeing  11/13/2023 1832 by Marilia Yuan LPN  Outcome: Met  11/13/2023 1500 by Marilia Yuan LPN  Outcome: Ongoing, Progressing  Goal: Readiness for Transition of Care  11/13/2023 1832 by Marilia Yuan LPN  Outcome: Met  11/13/2023 1500 by Marilia Yuan LPN  Outcome: Ongoing, Progressing     Problem: Diabetes Comorbidity  Goal: Blood Glucose Level Within Targeted Range  11/13/2023 1832 by Marilia Yuan LPN  Outcome: Met  11/13/2023 1500 by Marilia Yuan LPN  Outcome: Ongoing, Progressing     Problem: Fluid Imbalance (Pneumonia)  Goal: Fluid Balance  11/13/2023 1832 by Marilia Yuan LPN  Outcome: Met  11/13/2023 1500 by Marilia Yuan LPN  Outcome: Ongoing, Progressing     Problem: Infection (Pneumonia)  Goal: Resolution of Infection Signs and Symptoms  11/13/2023 1832 by Marilia Yuan LPN  Outcome: Met  11/13/2023 1500 by Marilia Yuan LPN  Outcome: Ongoing, Progressing     Problem: Respiratory Compromise (Pneumonia)  Goal: Effective Oxygenation and Ventilation  11/13/2023 1832 by Marilia Yuan LPN  Outcome: Met  11/13/2023 1500 by Marilia Yuan LPN  Outcome: Ongoing, Progressing     Problem: Impaired Wound Healing  Goal:  Optimal Wound Healing  11/13/2023 1832 by Marilia Yuan LPN  Outcome: Met  11/13/2023 1500 by Marilia Yuan LPN  Outcome: Ongoing, Progressing     Problem: Skin Injury Risk Increased  Goal: Skin Health and Integrity  11/13/2023 1832 by Marilia Yuan LPN  Outcome: Met  11/13/2023 1500 by Marilia Yuan LPN  Outcome: Ongoing, Progressing     Problem: Fall Injury Risk  Goal: Absence of Fall and Fall-Related Injury  11/13/2023 1832 by Marilia Yuan LPN  Outcome: Met  11/13/2023 1500 by Marilia Yuan LPN  Outcome: Ongoing, Progressing

## 2023-11-14 NOTE — PROGRESS NOTES
Chief complaint:  Unresponsive (Found unresponsive in wheelchair at home/)      HPI:  Marisol Kerr is a 78 y.o. 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 Pt had the wound debrided in the OR, and is leaving for nursing home this today. Wound is necrotic still, and she is being discharged. Pt has no other complaints today. She has improved since last seen at bedside.    PMH:  As per HPI and below:  Past Medical History:   Diagnosis Date    CAD (coronary artery disease)     Cancer     colon    CHF (congestive heart failure)     Colitis     Colon cancer     COPD (chronic obstructive pulmonary disease)     Decreased hearing     Diabetes mellitus     Diabetes mellitus, type 2     Hypercholesterolemia     Hypertension     Hypoxemia     Insomnia     Obesity     Osteoarthritis        Social History     Socioeconomic History    Marital status:    Tobacco Use    Smoking status: Former     Current packs/day: 0.00     Average packs/day: 3.0 packs/day for 50.0 years (150.1 ttl pk-yrs)     Types: Cigarettes     Start date: 3/30/1964     Quit date: 2006     Years since quittin.7    Smokeless tobacco: Never    Tobacco comments:     ex smoker 15 years   Substance and Sexual Activity    Alcohol use: Yes    Drug use: No    Sexual activity: Never     Social Determinants of Health     Financial Resource Strain: Unknown (2023)    Overall Financial Resource Strain (CARDIA)     Difficulty of Paying Living Expenses: Patient refused   Food Insecurity: No Food Insecurity (2023)    Hunger Vital Sign     Worried About Running Out of Food in the Last Year: Never true     Ran Out of Food in the Last Year: Never true   Transportation Needs: No Transportation Needs (2023)    PRAPARE - Transportation     Lack of Transportation (Medical): No     Lack of Transportation (Non-Medical): No   Physical Activity: Unknown (2023)    Exercise Vital Sign     Days of Exercise per Week: Patient refused      Minutes of Exercise per Session: Patient refused   Recent Concern: Physical Activity - Inactive (7/23/2023)    Exercise Vital Sign     Days of Exercise per Week: 0 days     Minutes of Exercise per Session: 0 min   Stress: Unknown (7/28/2023)    [employer] - Occupational Stress Questionnaire     Feeling of Stress : Patient refused   Recent Concern: Stress - Stress Concern Present (7/23/2023)    [employer] - Occupational Stress Questionnaire     Feeling of Stress : Rather much   Social Connections: Unknown (7/28/2023)    Social Connection and Isolation Panel [NHANES]     Frequency of Communication with Friends and Family: Patient refused     Frequency of Social Gatherings with Friends and Family: Patient refused     Attends Rastafari Services: Patient refused     Active Member of Clubs or Organizations: Patient refused     Attends Club or Organization Meetings: Patient refused     Marital Status:    Recent Concern: Social Connections - Socially Isolated (7/23/2023)    Social Connection and Isolation Panel [NHANES]     Frequency of Communication with Friends and Family: More than three times a week     Frequency of Social Gatherings with Friends and Family: More than three times a week     Attends Rastafari Services: Never     Active Member of Clubs or Organizations: No     Attends Club or Organization Meetings: Never     Marital Status:    Housing Stability: Low Risk  (7/28/2023)    Housing Stability Vital Sign     Unable to Pay for Housing in the Last Year: No     Number of Places Lived in the Last Year: 1     Unstable Housing in the Last Year: No       Past Surgical History:   Procedure Laterality Date    ANGIOGRAM, CORONARY, WITH LEFT HEART CATHETERIZATION N/A 2/10/2022    Procedure: Angiogram, Coronary, with Left Heart Cath;  Surgeon: Cristian Benjamin MD;  Location: [facility] CATH/EP LAB;  Service: Cardiology;  Laterality: N/A;    CARDIAC  CATHETERIZATION      CHOLECYSTECTOMY      COLECTOMY      CORONARY ANGIOGRAPHY N/A 8/29/2023    Procedure: ANGIOGRAM, CORONARY ARTERY;  Surgeon: Nba Riggs MD;  [place] [facility] [place] [facility];  Service: Cardiology;  Laterality: N/A;    CORONARY ANGIOPLASTY      CORONARY STENT PLACEMENT      DEBRIDEMENT OF SACRAL WOUND N/A 11/10/2023    Procedure: DEBRIDEMENT, WOUND, SACRUM;  Surgeon: Jean Linda Jr., MD;  Location: [facility] OR;  Service: General;  Laterality: N/A;  ok to stay on hospital bed    ERCP N/A 1/16/2023    Procedure: ERCP (ENDOSCOPIC RETROGRADE CHOLANGIOPANCREATOGRAPHY);  Surgeon: Biju Kramer III, MD;  Location: [facility] ENDO;  Service: Endoscopy;  Laterality: N/A;    ESOPHAGOGASTRODUODENOSCOPY N/A 1/29/2023    Procedure: EGD (ESOPHAGOGASTRODUODENOSCOPY);  Surgeon: Aarti Alvarez MD;  Location: [facility] ENDO;  Service: Endoscopy;  Laterality: N/A;    EXTERNAL EAR SURGERY      EYE SURGERY      FLEXIBLE SIGMOIDOSCOPY N/A 9/19/2019    Procedure: SIGMOIDOSCOPY, FLEXIBLE;  Surgeon: Biju Kramer III, MD;  Location: [facility] ENDO;  Service: Endoscopy;  Laterality: N/A;    HYSTERECTOMY      partial    INSTANTANEOUS WAVE-FREE RATIO (IFR) N/A 8/29/2023    Procedure: Instantaneous Wave-Free Ratio (IFR);  Surgeon: Nba Riggs MD;  [place] [facility];  Service: Cardiology;  Laterality: N/A;    STENT, DRUG ELUTING, SINGLE VESSEL, CORONARY N/A 8/29/2023    Procedure: Stent, Drug Eluting, Single Vessel, Coronary;  Surgeon: Nba Riggs MD;  [place] [facility];  Service: Cardiology;  Laterality: N/A;       Family History   Problem Relation Age of Onset    Febrile seizures Mother     Heart failure Father        Review of patient's allergies indicates:   Allergen Reactions    Iodinated contrast media     Strawberries [strawberry] Hives and Itching       No current facility-administered medications on file prior to encounter.     Current Outpatient Medications on File Prior to Encounter    Medication Sig Dispense Refill    albuterol (VENTOLIN HFA) 90 mcg/actuation inhaler Inhale 2 puffs into the lungs every 6 (six) hours as needed for Wheezing or Shortness of Breath. Rescue 36 g 3    albuterol-ipratropium (DUO-NEB) 2.5 mg-0.5 mg/3 mL nebulizer solution Take 3 mLs by nebulization every 4 (four) hours as needed for Wheezing or Shortness of Breath. Rescue 120 each 6    allopurinoL (ZYLOPRIM) 100 MG tablet Take 0.5 tablets (50 mg total) by mouth once daily. 45 tablet 1    ALPRAZolam (XANAX) 0.25 MG tablet Take 1 tablet (0.25 mg total) by mouth every evening. 90 tablet 1    amLODIPine (NORVASC) 10 MG tablet Take 1 tablet (10 mg total) by mouth once daily. 30 tablet 11    atorvastatin (LIPITOR) 40 MG tablet Take 1 tablet (40 mg total) by mouth once daily. 90 tablet 3    cholestyramine-aspartame (QUESTRAN/PREVALITE LIGHT) 4 gram Powd Take 4 g by mouth once daily.      clopidogreL (PLAVIX) 75 mg tablet Take 1 tablet (75 mg total) by mouth once daily. 30 tablet 11    coenzyme Q10 100 mg capsule Take 100 mg by mouth every morning.      ergocalciferol (VITAMIN D2) 50,000 unit Cap Take 1 capsule (50,000 Units total) by mouth every 7 days. 12 capsule 1    ferrous sulfate 325 (65 FE) MG EC tablet Take 325 mg by mouth once daily.      fish oil-omega-3 fatty acids 300-1,000 mg capsule Take 2 capsules by mouth every morning.      furosemide (LASIX) 20 MG tablet Take 1 tablet (20 mg total) by mouth every other day. TAKE WITH POTASSIUM 30 tablet 4    gabapentin (NEURONTIN) 300 MG capsule Take 300 mg by mouth Daily.      ipratropium-albuteroL (COMBIVENT RESPIMAT)  mcg/actuation inhaler Inhale 2 puffs into the lungs every 4 (four) hours as needed for Shortness of Breath. Rescue 12 g 3    ketoconazole (NIZORAL) 2 % cream Apply 1 application  topically 2 (two) times daily.      magnesium oxide (MAG-OX) 400 mg (241.3 mg magnesium) tablet Take 1 tablet by mouth 2 (two) times daily.      metoprolol succinate  (TOPROL-XL) 200 MG 24 hr tablet Take 1 tablet (200 mg total) by mouth once daily. 30 tablet 5    mupirocin (BACTROBAN) 2 % ointment Apply topically 2 (two) times daily. 30 g 3    nitroGLYCERIN (NITROSTAT) 0.4 MG SL tablet Place 1 tablet (0.4 mg total) under the tongue every 5 (five) minutes as needed for Chest pain. Up to 3 doses. If chest pain is not relieved or worsens 3 to 5 minutes after 1 dose, call 911 and seek immediate emergency medical attention. 25 tablet 2    pantoprazole (PROTONIX) 40 MG tablet Take 1 tablet (40 mg total) by mouth once daily. 90 tablet 3    potassium chloride (MICRO-K) 10 MEQ CpSR Take 1 capsule (10 mEq total) by mouth every other day. (TAKE WITH LASIX/FUROSEMIDE) 15 capsule 0    triamcinolone acetonide 0.1% (KENALOG) 0.1 % cream Apply 1 g topically 2 (two) times daily.      umeclidinium (INCRUSE ELLIPTA) 62.5 mcg/actuation inhalation capsule Inhale 62.5 mcg into the lungs every morning. Controller 90 each 3    vit C/E/Zn/coppr/lutein/zeaxan (PRESERVISION AREDS-2 ORAL) Take 1 tablet by mouth once daily.      VITAMIN C 500 MG tablet Take 500 mg by mouth 2 (two) times daily.      [DISCONTINUED] aspirin 81 MG Chew Chew and swallow 1 tablet (81 mg total) by mouth once daily. 30 tablet 11    [DISCONTINUED] benazepriL (LOTENSIN) 40 MG tablet Take 1 tablet (40 mg total) by mouth once daily. 90 tablet 1    [DISCONTINUED] cimetidine (TAGAMET) 300 MG tablet Take 1 tablet (300 mg total) by mouth every 6 (six) hours. Take 1 tablet (300 mg total) by mouth starting at 6 PM on Sunday April 17, 2022 (the evening before your angiogram procedure). Then take 1 tablet at 12 AM, then take 1 tablet at 6 AM on Monday April 18th. 3 tablet 0    [DISCONTINUED] dexAMETHasone (DECADRON) 4 MG Tab Take 4 mg by mouth Daily.      [DISCONTINUED] lisinopriL 10 MG tablet Take 1 tablet (10 mg total) by mouth once daily. HOLD UNTIL OTHERWISE DIRECTED BY PRIMARY CARE PROVIDER 90 tablet 3    [DISCONTINUED] lovastatin  "(MEVACOR) 40 MG tablet Take 1 tablet (40 mg total) by mouth every other day. 45 tablet 3       ROS: As per HPI and below:  Pertinent items are noted in HPI.      Physical Exam:     Vitals:    23 0749 23 1107 23 1322 23 1534   BP:  (!) 172/64  (!) 177/65   BP Location:  Left arm  Left arm   Patient Position:  Lying  Lying   Pulse: 65 92 94 99   Resp: 19 18 16 18   Temp:  97.8 °F (36.6 °C)  97.6 °F (36.4 °C)   TempSrc:  Oral  Oral   SpO2: 97% 99% 99% 96%   Weight:       Height:           BP  Min: 61/29  Max: 219/155  Temp  Av °F (36.7 °C)  Min: 96.8 °F (36 °C)  Max: 98.9 °F (37.2 °C)  Pulse  Av.6  Min: 54  Max: 141  Resp  Av.4  Min: 9  Max: 56  SpO2  Av.9 %  Min: 88 %  Max: 100 %  Height  Av' 3" (160 cm)  Min: 5' 2.99" (160 cm)  Max: 5' 3" (160 cm)  Weight  Av.5 kg (166 lb 7.2 oz)  Min: 68.9 kg (151 lb 14.4 oz)  Max: 80 kg (176 lb 5.9 oz)    Body mass index is 30.34 kg/m².          General:             Well developed, well nourished, no apparent distress  HEENT:              NCAT, no JVD, mucous membranes moist, EOM intact  Cardiovascular:  Regular rate and rhythm, normal S1, normal S2, No murmurs, rubs, or gallops  Respiratory:        Normal breath sounds, no wheezes, no rales, no rhonchi  Abdomen:           Bowel sounds present, non tender, non distended, no masses, no hepatojugular reflux  Extremities:        No clubbing, no cyanosis, no edema  Vascular:            2+ b/l radial.  Peripheral pulses intact.  No carotid bruits.  Neurological:      No focal deficits  Skin:                   No obvious rashes or erythema, necrotic unstagable pressure ulcer of the sacrum, rapidly progressed from a DTI on admission, color and shape appear to be a Stephane ulcer    23                Lab Results   Component Value Date    WBC 9.29 2023    HGB 8.9 (L) 2023    HCT 30.5 (L) 2023    MCV 93 2023     2023     Lab Results "   Component Value Date    CHOL 96 (L) 07/22/2023    CHOL 165 09/21/2021    CHOL 151 01/07/2021     Lab Results   Component Value Date    HDL 44 07/22/2023    HDL 53 09/21/2021    HDL 48 01/07/2021     Lab Results   Component Value Date    LDLCALC 31.8 (L) 07/22/2023    LDLCALC 98.2 09/21/2021    LDLCALC 81.2 01/07/2021     Lab Results   Component Value Date    TRIG 101 07/22/2023    TRIG 69 09/21/2021    TRIG 109 01/07/2021     Lab Results   Component Value Date    CHOLHDL 45.8 07/22/2023    CHOLHDL 32.1 09/21/2021    CHOLHDL 31.8 01/07/2021     CMP  Recent Labs   Lab 11/13/23  0422   GLU 98   CALCIUM 8.3*   ALBUMIN 3.0*   PROT 5.2*      K 4.3   CO2 36*      BUN 19   CREATININE 1.4   ALKPHOS 52*   ALT 13   AST 15   BILITOT 0.3        Lab Results   Component Value Date    TSH 0.902 08/29/2023           Assessment and Recommendations       Diagnoses:    1.Stage 4 sacral pressure ulcer    Plan:  Medihoney + mepilex daily    Complexity:    low

## 2023-11-27 PROBLEM — J96.21 ACUTE ON CHRONIC RESPIRATORY FAILURE WITH HYPOXIA AND HYPERCAPNIA: Status: RESOLVED | Noted: 2023-05-26 | Resolved: 2023-11-27

## 2023-11-27 PROBLEM — J96.22 ACUTE ON CHRONIC RESPIRATORY FAILURE WITH HYPOXIA AND HYPERCAPNIA: Status: RESOLVED | Noted: 2023-05-26 | Resolved: 2023-11-27

## 2023-12-11 PROBLEM — J96.20 ACUTE ON CHRONIC RESPIRATORY FAILURE: Status: RESOLVED | Noted: 2022-02-10 | Resolved: 2023-12-11

## 2024-01-02 NOTE — TELEPHONE ENCOUNTER
Patient states she has had to miss her last two appts due to having diarrhea.  She is seeing Dr. Logan who has started her on meds but they are not working real well at this point.  [facility] appt scheduled with pt to see Dr. Argentina Laguna on 10/1/18 at 4:40 pm.   gas

## 2024-01-11 DIAGNOSIS — Z00.00 ENCOUNTER FOR MEDICARE ANNUAL WELLNESS EXAM: ICD-10-CM

## 2024-02-05 PROBLEM — J96.01 ACUTE RESPIRATORY FAILURE WITH HYPOXIA AND HYPERCARBIA: Status: RESOLVED | Noted: 2023-01-27 | Resolved: 2024-02-05

## 2024-02-05 PROBLEM — J96.02 ACUTE RESPIRATORY FAILURE WITH HYPOXIA AND HYPERCARBIA: Status: RESOLVED | Noted: 2023-01-27 | Resolved: 2024-02-05

## 2024-02-05 PROBLEM — J18.9 PNEUMONIA: Status: RESOLVED | Noted: 2023-01-27 | Resolved: 2024-02-05

## 2024-02-28 NOTE — CARE UPDATE
08/27/23 2025   Patient Assessment/Suction   Level of Consciousness (AVPU) alert   Respiratory Effort Normal;Unlabored   Expansion/Accessory Muscles/Retractions no use of accessory muscles   All Lung Fields Breath Sounds equal bilaterally;diminished;coarse   Rhythm/Pattern, Respiratory unlabored   Cough Frequency no cough   PRE-TX-O2   Device (Oxygen Therapy) nasal cannula with humidification   Flow (L/min) 4   SpO2 98 %   Pulse Oximetry Type Continuous   $ Pulse Oximetry - Multiple Charge Pulse Oximetry - Multiple   Pulse 62   Resp 18   Aerosol Therapy   $ Aerosol Therapy Charges Aerosol Treatment   Daily Review of Necessity (SVN) completed   Respiratory Treatment Status (SVN) given   Treatment Route (SVN) mask   Patient Position (SVN) semi-Solorzano's   Post Treatment Assessment (SVN) breath sounds unchanged   Signs of Intolerance (SVN) none   Breath Sounds Post-Respiratory Treatment   Throughout All Fields Post-Treatment All Fields   Throughout All Fields Post-Treatment no change   Post-treatment Heart Rate (beats/min) 59   Post-treatment Resp Rate (breaths/min) 18   Education   $ Education Bronchodilator;15 min   Respiratory Evaluation   $ Care Plan Tech Time 15 min   $ Eval/Re-eval Charges Evaluation   Evaluation For New Orders        Statement Selected

## 2024-12-15 NOTE — ASSESSMENT & PLAN NOTE
Maintain iv abx      [Time Spent: ___ minutes] : I have spent [unfilled] minutes of time on the encounter which excludes teaching and separately reported services.
